# Patient Record
Sex: MALE | Race: WHITE | NOT HISPANIC OR LATINO | Employment: OTHER | URBAN - NONMETROPOLITAN AREA
[De-identification: names, ages, dates, MRNs, and addresses within clinical notes are randomized per-mention and may not be internally consistent; named-entity substitution may affect disease eponyms.]

---

## 2017-01-03 ENCOUNTER — OFFICE VISIT (OUTPATIENT)
Dept: FAMILY MEDICINE CLINIC | Facility: CLINIC | Age: 47
End: 2017-01-03

## 2017-01-03 VITALS
HEART RATE: 82 BPM | HEIGHT: 67 IN | TEMPERATURE: 98.6 F | BODY MASS INDEX: 32.96 KG/M2 | SYSTOLIC BLOOD PRESSURE: 120 MMHG | DIASTOLIC BLOOD PRESSURE: 82 MMHG | OXYGEN SATURATION: 98 % | WEIGHT: 210 LBS

## 2017-01-03 DIAGNOSIS — E66.9 OBESITY (BMI 30.0-34.9): ICD-10-CM

## 2017-01-03 DIAGNOSIS — K21.9 GASTROESOPHAGEAL REFLUX DISEASE WITHOUT ESOPHAGITIS: ICD-10-CM

## 2017-01-03 DIAGNOSIS — E78.00 PURE HYPERCHOLESTEROLEMIA: ICD-10-CM

## 2017-01-03 DIAGNOSIS — G40.909 SEIZURE DISORDER (HCC): ICD-10-CM

## 2017-01-03 DIAGNOSIS — M19.90 ARTHRITIS: ICD-10-CM

## 2017-01-03 DIAGNOSIS — G89.29 CHRONIC ELBOW PAIN, RIGHT: Primary | ICD-10-CM

## 2017-01-03 DIAGNOSIS — M25.521 CHRONIC ELBOW PAIN, RIGHT: Primary | ICD-10-CM

## 2017-01-03 DIAGNOSIS — M25.562 CHRONIC PAIN OF LEFT KNEE: ICD-10-CM

## 2017-01-03 DIAGNOSIS — I10 ESSENTIAL HYPERTENSION: ICD-10-CM

## 2017-01-03 DIAGNOSIS — F41.9 ANXIETY: ICD-10-CM

## 2017-01-03 DIAGNOSIS — G89.29 CHRONIC PAIN OF LEFT KNEE: ICD-10-CM

## 2017-01-03 PROBLEM — E66.811 OBESITY (BMI 30.0-34.9): Status: ACTIVE | Noted: 2017-01-03

## 2017-01-03 PROCEDURE — 99214 OFFICE O/P EST MOD 30 MIN: CPT | Performed by: NURSE PRACTITIONER

## 2017-01-03 RX ORDER — BACLOFEN 10 MG/1
10 TABLET ORAL 3 TIMES DAILY
Qty: 90 TABLET | Refills: 3 | Status: SHIPPED | OUTPATIENT
Start: 2017-01-03 | End: 2017-02-13

## 2017-01-03 NOTE — MR AVS SNAPSHOT
Jaquan Mckay   1/3/2017 10:40 AM   Office Visit    Dept Phone:  [phone]   Encounter #:  [id number]    Provider:  BALDOMERO Thao   Department:  [facility]                Your Full Care Plan              Today's Medication Changes          These changes are accurate as of: 1/3/17 12:26 PM.  If you have any questions, ask your nurse or doctor.               New Medication(s)Ordered:     baclofen 10 MG tablet   Commonly known as:  LIORESAL   Take 1 tablet by mouth 3 (Three) Times a Day.   Started by:  BALDOMERO Thao         Stop taking medication(s)listed here:     TiZANidine 4 MG capsule   Commonly known as:  ZANAFLEX   Stopped by:  BALDOMERO Thao                Where to Get Your Medications      These medications were sent to [place], [place] - [address] [facility] [address] - [phone] [facility] [phone]   [address] [facility] [address] [facility] [address]     Phone:  [phone]     baclofen 10 MG tablet                  Your Updated Medication List          This list is accurate as of: 1/3/17 12:26 PM.  Always use your most recent med list.                baclofen 10 MG tablet   Commonly known as:  LIORESAL   Take 1 tablet by mouth 3 (Three) Times a Day.       budesonide-formoterol 160-4.5 MCG/ACT inhaler   Commonly known as:  SYMBICORT   Inhale 2 puffs 2 (Two) Times a Day.       * dicyclomine 10 MG capsule   Commonly known as:  BENTYL   Take 1 capsule by mouth 4 (Four) Times a Day As Needed (abdominal pain).       * dicyclomine 20 MG tablet   Commonly known as:  BENTYL   Take 1 tablet by mouth 3 (Three) Times a Day.       DILANTIN 100 MG ER capsule   Generic drug:  phenytoin   Brand name necessary       docusate calcium 240 MG capsule   Commonly known as:  SURFAK   Take 1 capsule by mouth 2 (Two) Times a Day.       Elbow Brace misc   1 each daily.       lisinopril 20 MG tablet   Commonly known as:  PRINIVIL,ZESTRIL   Take 1 tablet by  mouth daily.       loratadine 10 MG tablet   Commonly known as:  CLARITIN   Take 1 tablet by mouth daily as needed for allergies.       meclizine 25 MG tablet   Commonly known as:  ANTIVERT   Take 1 tablet by mouth 3 (Three) Times a Day As Needed for dizziness.       metoclopramide 10 MG tablet   Commonly known as:  REGLAN   Take 1 tablet by mouth 2 (two) times a day before meals.       metoprolol succinate XL 25 MG 24 hr tablet   Commonly known as:  TOPROL-XL   Take 1 tablet by mouth daily.       montelukast 10 MG tablet   Commonly known as:  SINGULAIR   Take 1 tablet by mouth Every Night.       pantoprazole 40 MG EC tablet   Commonly known as:  PROTONIX   Take 1 tablet by mouth Daily.       pravastatin 20 MG tablet   Commonly known as:  PRAVACHOL   Take 1 tablet by mouth Daily.       promethazine 25 MG tablet   Commonly known as:  PHENERGAN       raNITIdine 300 MG tablet   Commonly known as:  ZANTAC   Take 0.5 tablets by mouth 2 (Two) Times a Day.       trifluoperazine 2 MG tablet   Commonly known as:  STELAZINE   Take 1 tablet by mouth 2 (two) times a day.       VESICARE 5 MG tablet   Generic drug:  solifenacin       * Notice:  This list has 2 medication(s) that are the same as other medications prescribed for you. Read the directions carefully, and ask your doctor or other care provider to review them with you.            We Performed the Following     Ambulatory Referral to Orthopedic Surgery     CBC & Differential     Comprehensive Metabolic Panel     Hemoglobin A1c     Lipid Panel     Phenytoin Level, Free     Phenytoin Level, Total     TSH     Vitamin B12     Vitamin D 25 Hydroxy       You Were Diagnosed With        Codes Comments    Chronic elbow pain, right    -  Primary ICD-10-CM: M25.521, G89.29  ICD-9-CM: 719.42, 338.29     Chronic pain of left knee     ICD-10-CM: M25.562, G89.29  ICD-9-CM: 719.46, 338.29     Anxiety     ICD-10-CM: F41.9  ICD-9-CM: 300.00     Arthritis     ICD-10-CM: M19.90  ICD-9-CM:  "716.90     Gastroesophageal reflux disease without esophagitis     ICD-10-CM: K21.9  ICD-9-CM: 530.81     Pure hypercholesterolemia     ICD-10-CM: E78.00  ICD-9-CM: 272.0     Essential hypertension     ICD-10-CM: I10  ICD-9-CM: 401.9     Seizure disorder     ICD-10-CM: G40.909  ICD-9-CM: 345.90     Obesity (BMI 30.0-34.9)     ICD-10-CM: E66.9  ICD-9-CM: 278.00       Instructions     None    Patient Instructions History      Upcoming Appointments     Visit Type Date Time Department    OFFICE VISIT 1/3/2017 10:40 AM [facility]    OFFICE VISIT 1/31/2017 10:00 AM [facility]      MyChart Signup     Our records indicate that you have declined [facility] [employer] signup. If you would like to sign up for [employer], please email [email or web address] or call [phone] to obtain an activation code.             Other Info from Your Visit           Your Appointments     Jan 31, 2017 10:00 AM EST   Office Visit with BALDOMERO Thao   [facility] (--)    [address] [place] [id number] [phone]           Please arrive 10 minutes early, bring a complete list of all medications and bring any previous records or diagnostic testing results.              Allergies     Paxil [Paroxetine Hcl]  Shortness Of Breath    Chest pain     Ciprofloxacin      Contrast Dye      Mobic [Meloxicam]      Penicillins      Prednisone      Robitussin Cough+ Chest Max St  [Dextromethorphan-guaifenesin]      Sulfa Antibiotics      Zoloft [Sertraline Hcl]  Hives, Itching      Reason for Visit     Anxiety     Depression     Hypertension           Vital Signs     Blood Pressure Pulse Temperature Height Weight Oxygen Saturation    120/82 (BP Location: Left arm, Patient Position: Sitting) 82 98.6 °F (37 °C) (Tympanic) 67\" (170.2 cm) 210 lb (95.3 kg) 98%    Body Mass Index Smoking Status                32.89 kg/m2 Current Every Day Smoker          Problems and Diagnoses Noted     " Anxiety problem    Arthritis    Acid reflux disease    High cholesterol or triglycerides    High blood pressure    Obesity    Seizure disorder    Chronic elbow pain    -  Primary    Chronic pain of left knee

## 2017-01-03 NOTE — PROGRESS NOTES
"Subjective   Jaquan Mckay is a 46 y.o. male.     Chief Complaint   Patient presents with   • Anxiety   • Depression   • Hypertension       History of Present Illness     Multiple joint complaints-left knee, right elbow mostly.  Reports a twisting injury to his left knee and it is now trying to \"lock up\".  He reports ongoing edema at times and \"it is going out like my right one did before the surgery.\"  Pain is exacerbated by walking and trying to work out.  He reports \"electical sensation\" in his arm with pressure over his elbow.  He request a referral to [facility] to orthopedic for further eval.  Not at goal.  Testicle pain-ongoing.  Has been seen by urology.  Ultrasound done.  No Specific diagnosis reported today.  Will follow up with urology.   Chest pain-not of new onset.  No acute findings at ED visit.  He has not followed with cardio as he was suppose to.  He does plan to call and make an appt for further eval.  Not at goal  Anxiety-reports significant increase during the holidays.  He reports he is not sleeping.  He is going to comp care but is not getting any med changes as he is \"allergic\" to meds or he does not have therapeutic response.      The following portions of the patient's history were reviewed and updated as appropriate: allergies, current medications, past family history, past medical history, past social history, past surgical history and problem list.    Review of Systems   Constitutional: Negative for appetite change, fatigue and unexpected weight change.   HENT: Negative for congestion, nosebleeds, postnasal drip, rhinorrhea, trouble swallowing and voice change.    Eyes: Negative for pain and visual disturbance.   Respiratory: Negative for cough, chest tightness and wheezing.    Cardiovascular: Positive for chest pain (missed his last cardio appt). Negative for palpitations and leg swelling.   Gastrointestinal: Negative for abdominal pain, blood in stool, constipation and diarrhea.        " "Heart burn controlled at present   Endocrine: Negative for cold intolerance and polydipsia.   Genitourinary: Negative for difficulty urinating and flank pain.   Musculoskeletal: Positive for arthralgias (rt elbow and left knee.  He reports his knee \"tries to give out on him.\"  he continues to have pain ). Negative for gait problem, joint swelling and myalgias.        Reports popping sensation in his right elbow with certain positions   Skin: Negative for color change and rash.   Allergic/Immunologic: Negative.    Neurological: Negative for syncope, numbness and headaches.   Hematological: Negative.    Psychiatric/Behavioral: Positive for sleep disturbance (reports [facility] has tried him on several meds and he has been unable to tolerate). Negative for suicidal ideas. The patient is nervous/anxious.         Depression with anxiety.  He was seen at [facility] recently and is going Q 2 weeks for therapy   All other systems reviewed and are negative.      Objective     Visit Vitals   • /82 (BP Location: Left arm, Patient Position: Sitting)   • Pulse 82   • Temp 98.6 °F (37 °C) (Tympanic)   • Ht 67\" (170.2 cm)   • Wt 210 lb (95.3 kg)   • SpO2 98%   • BMI 32.89 kg/m2       Physical Exam   Constitutional: He is oriented to person, place, and time. He appears well-developed and well-nourished. No distress.   HENT:   Head: Normocephalic and atraumatic.   Right Ear: External ear normal.   Left Ear: External ear normal.   Nose: Nose normal.   Mouth/Throat: Oropharynx is clear and moist.   Eyes: Conjunctivae and EOM are normal. Pupils are equal, round, and reactive to light.   Neck: Normal range of motion. Neck supple. No thyromegaly present.   Cardiovascular: Normal rate, regular rhythm and normal heart sounds.    No murmur heard.  Pulmonary/Chest: Effort normal and breath sounds normal. He exhibits tenderness (around right breast area).   Abdominal: Soft. Bowel sounds are normal. He exhibits no mass. There is no " "tenderness.   Musculoskeletal: He exhibits tenderness. He exhibits no edema.        Right elbow: He exhibits decreased range of motion (history of previous fracture at \"age 7\" then a fall several months ago that has exacerbated symtpoms.). He exhibits no effusion and no deformity. Tenderness found. Medial epicondyle (that radiates upward toward the inner part of arm) tenderness noted.        Left knee: He exhibits decreased range of motion and bony tenderness. He exhibits no swelling and normal alignment. Tenderness found. Medial joint line and lateral joint line tenderness noted.    3/5 in right hand with decreased ability to push and pull against resistance      Skin Integrity  -  His right foot skin is intact.   Jaquan's left foot skin is intact. .  Neurological: He is alert and oriented to person, place, and time. He has normal reflexes.   Skin: Skin is warm and dry.   Psychiatric: His behavior is normal. Judgment and thought content normal. His mood appears anxious. He exhibits a depressed mood. He expresses no homicidal and no suicidal ideation. He exhibits normal recent memory and normal remote memory.   Nursing note and vitals reviewed.      Assessment/Plan     Jaquan was seen today for anxiety, depression and hypertension.    Diagnoses and all orders for this visit:    Chronic elbow pain, right  -     Ambulatory Referral to Orthopedic Surgery    Chronic pain of left knee  -     Ambulatory Referral to Orthopedic Surgery    Anxiety    Arthritis    Gastroesophageal reflux disease without esophagitis  -     CBC & Differential    Pure hypercholesterolemia  -     Lipid Panel    Essential hypertension  -     CBC & Differential  -     Comprehensive Metabolic Panel    Seizure disorder  -     Phenytoin Level, Total  -     Phenytoin Level, Free    Obesity (BMI 30.0-34.9)  -     TSH  -     Hemoglobin A1c  -     Vitamin D 25 Hydroxy  -     Vitamin B12    Other orders  -     baclofen (LIORESAL) 10 MG tablet; Take 1 " tablet by mouth 3 (Three) Times a Day.      Order for fasting labs.  Patient to have done at [facility] per his request  Will request his Comp care records to see what psych meds have been tried.  Continue meds as ordered  Will do trial of baclofen and stop Zanaflex  Referral to Ortho for continued joint complaints  RTC 4 weeks, sooner if needed.

## 2017-01-09 ENCOUNTER — APPOINTMENT (OUTPATIENT)
Dept: LAB | Facility: HOSPITAL | Age: 47
End: 2017-01-09

## 2017-01-09 LAB
25(OH)D3 SERPL-MCNC: 26 NG/ML
ALBUMIN SERPL-MCNC: 4.2 G/DL (ref 3.5–5)
ALBUMIN/GLOB SERPL: 1.1 G/DL (ref 1.5–2.5)
ALP SERPL-CCNC: 95 U/L (ref 46–116)
ALT SERPL W P-5'-P-CCNC: 23 U/L (ref 10–44)
ANION GAP SERPL CALCULATED.3IONS-SCNC: 6.1 MMOL/L (ref 3.6–11.2)
AST SERPL-CCNC: 20 U/L (ref 10–34)
BASOPHILS # BLD AUTO: 0.01 10*3/MM3 (ref 0–0.3)
BASOPHILS NFR BLD AUTO: 0.1 % (ref 0–2)
BILIRUB SERPL-MCNC: 0.4 MG/DL (ref 0.2–1.8)
BUN BLD-MCNC: 12 MG/DL (ref 7–21)
BUN/CREAT SERPL: 11.7 (ref 7–25)
CALCIUM SPEC-SCNC: 9.4 MG/DL (ref 7.7–10)
CHLORIDE SERPL-SCNC: 102 MMOL/L (ref 99–112)
CHOLEST SERPL-MCNC: 257 MG/DL (ref 0–200)
CO2 SERPL-SCNC: 30.9 MMOL/L (ref 24.3–31.9)
CREAT BLD-MCNC: 1.03 MG/DL (ref 0.43–1.29)
DEPRECATED RDW RBC AUTO: 44.8 FL (ref 37–54)
EOSINOPHIL # BLD AUTO: 0.1 10*3/MM3 (ref 0–0.7)
EOSINOPHIL NFR BLD AUTO: 1.3 % (ref 0–5)
ERYTHROCYTE [DISTWIDTH] IN BLOOD BY AUTOMATED COUNT: 13 % (ref 11.5–14.5)
GFR SERPL CREATININE-BSD FRML MDRD: 78 ML/MIN/1.73
GLOBULIN UR ELPH-MCNC: 3.7 GM/DL
GLUCOSE BLD-MCNC: 116 MG/DL (ref 70–110)
HBA1C MFR BLD: 4.7 % (ref 4.5–5.7)
HCT VFR BLD AUTO: 46.9 % (ref 42–52)
HDLC SERPL-MCNC: 74 MG/DL (ref 60–100)
HGB BLD-MCNC: 16.5 G/DL (ref 14–18)
IMM GRANULOCYTES # BLD: 0.01 10*3/MM3 (ref 0–0.03)
IMM GRANULOCYTES NFR BLD: 0.1 % (ref 0–0.5)
LDLC SERPL CALC-MCNC: 169 MG/DL (ref 0–100)
LDLC/HDLC SERPL: 2.29 {RATIO}
LYMPHOCYTES # BLD AUTO: 1.27 10*3/MM3 (ref 1–3)
LYMPHOCYTES NFR BLD AUTO: 16.3 % (ref 21–51)
MCH RBC QN AUTO: 33.3 PG (ref 27–33)
MCHC RBC AUTO-ENTMCNC: 35.2 G/DL (ref 33–37)
MCV RBC AUTO: 94.6 FL (ref 80–94)
MONOCYTES # BLD AUTO: 1.14 10*3/MM3 (ref 0.1–0.9)
MONOCYTES NFR BLD AUTO: 14.6 % (ref 0–10)
NEUTROPHILS # BLD AUTO: 5.28 10*3/MM3 (ref 1.4–6.5)
NEUTROPHILS NFR BLD AUTO: 67.6 % (ref 30–70)
OSMOLALITY SERPL CALC.SUM OF ELEC: 278.3 MOSM/KG (ref 273–305)
PHENYTOIN SERPL-MCNC: 14.6 MCG/ML (ref 10–20)
PLATELET # BLD AUTO: 169 10*3/MM3 (ref 130–400)
PMV BLD AUTO: 10.3 FL (ref 6–10)
POTASSIUM BLD-SCNC: 4.7 MMOL/L (ref 3.5–5.3)
PROT SERPL-MCNC: 7.9 G/DL (ref 6–8)
RBC # BLD AUTO: 4.96 10*6/MM3 (ref 4.7–6.1)
SODIUM BLD-SCNC: 139 MMOL/L (ref 135–153)
TRIGL SERPL-MCNC: 68 MG/DL (ref 0–150)
TSH SERPL DL<=0.05 MIU/L-ACNC: 3.66 MIU/ML (ref 0.55–4.78)
VIT B12 BLD-MCNC: 426 PG/ML (ref 211–911)
VLDLC SERPL-MCNC: 13.6 MG/DL
WBC NRBC COR # BLD: 7.81 10*3/MM3 (ref 4.5–12.5)

## 2017-01-09 PROCEDURE — 82607 VITAMIN B-12: CPT | Performed by: NURSE PRACTITIONER

## 2017-01-09 PROCEDURE — 80186 ASSAY OF PHENYTOIN FREE: CPT | Performed by: NURSE PRACTITIONER

## 2017-01-09 PROCEDURE — 36415 COLL VENOUS BLD VENIPUNCTURE: CPT | Performed by: NURSE PRACTITIONER

## 2017-01-09 PROCEDURE — 80050 GENERAL HEALTH PANEL: CPT | Performed by: NURSE PRACTITIONER

## 2017-01-09 PROCEDURE — 83036 HEMOGLOBIN GLYCOSYLATED A1C: CPT | Performed by: NURSE PRACTITIONER

## 2017-01-09 PROCEDURE — 82306 VITAMIN D 25 HYDROXY: CPT | Performed by: NURSE PRACTITIONER

## 2017-01-09 PROCEDURE — 80061 LIPID PANEL: CPT | Performed by: NURSE PRACTITIONER

## 2017-01-09 PROCEDURE — 80185 ASSAY OF PHENYTOIN TOTAL: CPT | Performed by: NURSE PRACTITIONER

## 2017-01-10 ENCOUNTER — OFFICE VISIT (OUTPATIENT)
Dept: FAMILY MEDICINE CLINIC | Facility: CLINIC | Age: 47
End: 2017-01-10

## 2017-01-10 VITALS
TEMPERATURE: 98.9 F | OXYGEN SATURATION: 98 % | HEART RATE: 91 BPM | BODY MASS INDEX: 33.43 KG/M2 | DIASTOLIC BLOOD PRESSURE: 60 MMHG | SYSTOLIC BLOOD PRESSURE: 115 MMHG | HEIGHT: 67 IN | WEIGHT: 213 LBS

## 2017-01-10 DIAGNOSIS — J45.901 ASTHMA EXACERBATION: Primary | ICD-10-CM

## 2017-01-10 LAB — PHENYTOIN FREE SERPL-MCNC: 0.9 UG/ML (ref 1–2)

## 2017-01-10 PROCEDURE — 99213 OFFICE O/P EST LOW 20 MIN: CPT | Performed by: NURSE PRACTITIONER

## 2017-01-10 RX ORDER — DEXTROMETHORPHAN HYDROBROMIDE AND PROMETHAZINE HYDROCHLORIDE 15; 6.25 MG/5ML; MG/5ML
5 SYRUP ORAL 4 TIMES DAILY PRN
Qty: 180 ML | Refills: 0 | Status: SHIPPED | OUTPATIENT
Start: 2017-01-10 | End: 2017-03-20 | Stop reason: SDUPTHER

## 2017-01-10 RX ORDER — CEPHALEXIN 500 MG/1
500 CAPSULE ORAL 3 TIMES DAILY
Qty: 30 CAPSULE | Refills: 0 | Status: SHIPPED | OUTPATIENT
Start: 2017-01-10 | End: 2017-01-20

## 2017-01-10 RX ORDER — ALBUTEROL SULFATE 90 UG/1
2 AEROSOL, METERED RESPIRATORY (INHALATION) EVERY 4 HOURS PRN
Qty: 1 INHALER | Refills: 5 | Status: SHIPPED | OUTPATIENT
Start: 2017-01-10 | End: 2017-03-20 | Stop reason: SDUPTHER

## 2017-01-10 NOTE — MR AVS SNAPSHOT
Jaquan Mckay   1/10/2017 9:20 AM   Office Visit    Dept Phone:  [phone]   Encounter #:  [id number]    Provider:  BALDOMERO Thao   Department:  [facility]                Your Full Care Plan              Today's Medication Changes          These changes are accurate as of: 1/10/17 10:32 AM.  If you have any questions, ask your nurse or doctor.               New Medication(s)Ordered:     albuterol 108 (90 BASE) MCG/ACT inhaler   Commonly known as:  PROVENTIL HFA;VENTOLIN HFA   Inhale 2 puffs Every 4 (Four) Hours As Needed for shortness of air.   Started by:  BALDOMERO Thao       cephalexin 500 MG capsule   Commonly known as:  KEFLEX   Take 1 capsule by mouth 3 (Three) Times a Day for 10 days.   Started by:  BALDOMERO Thao       promethazine-dextromethorphan 6.25-15 MG/5ML syrup   Commonly known as:  PROMETHAZINE-DM   Take 5 mL by mouth 4 (Four) Times a Day As Needed for cough.   Started by:  BALDOMERO Thao       sodium chloride 0.65 % nasal spray   Commonly known as:  OCEAN   2 sprays into each nostril As Needed for congestion.   Started by:  BALDOMERO Thao            Where to Get Your Medications      These medications were sent to [facility] [place], [place] - [address] [place] [address] - [phone]  - [phone] [address] [address]     Phone:  [phone]     albuterol 108 (90 BASE) MCG/ACT inhaler    cephalexin 500 MG capsule    promethazine-dextromethorphan 6.25-15 MG/5ML syrup    sodium chloride 0.65 % nasal spray                  Your Updated Medication List          This list is accurate as of: 1/10/17 10:32 AM.  Always use your most recent med list.                albuterol 108 (90 BASE) MCG/ACT inhaler   Commonly known as:  PROVENTIL HFA;VENTOLIN HFA   Inhale 2 puffs Every 4 (Four) Hours As Needed for shortness of air.       baclofen 10 MG tablet   Commonly known as:   LIORESAL   Take 1 tablet by mouth 3 (Three) Times a Day.       budesonide-formoterol 160-4.5 MCG/ACT inhaler   Commonly known as:  SYMBICORT   Inhale 2 puffs 2 (Two) Times a Day.       cephalexin 500 MG capsule   Commonly known as:  KEFLEX   Take 1 capsule by mouth 3 (Three) Times a Day for 10 days.       * dicyclomine 10 MG capsule   Commonly known as:  BENTYL   Take 1 capsule by mouth 4 (Four) Times a Day As Needed (abdominal pain).       * dicyclomine 20 MG tablet   Commonly known as:  BENTYL   Take 1 tablet by mouth 3 (Three) Times a Day.       DILANTIN 100 MG ER capsule   Generic drug:  phenytoin   Brand name necessary       docusate calcium 240 MG capsule   Commonly known as:  SURFAK   Take 1 capsule by mouth 2 (Two) Times a Day.       Elbow Brace misc   1 each daily.       lisinopril 20 MG tablet   Commonly known as:  PRINIVIL,ZESTRIL   Take 1 tablet by mouth daily.       loratadine 10 MG tablet   Commonly known as:  CLARITIN   Take 1 tablet by mouth daily as needed for allergies.       meclizine 25 MG tablet   Commonly known as:  ANTIVERT   Take 1 tablet by mouth 3 (Three) Times a Day As Needed for dizziness.       metoclopramide 10 MG tablet   Commonly known as:  REGLAN   Take 1 tablet by mouth 2 (two) times a day before meals.       metoprolol succinate XL 25 MG 24 hr tablet   Commonly known as:  TOPROL-XL   Take 1 tablet by mouth daily.       montelukast 10 MG tablet   Commonly known as:  SINGULAIR   Take 1 tablet by mouth Every Night.       pantoprazole 40 MG EC tablet   Commonly known as:  PROTONIX   Take 1 tablet by mouth Daily.       pravastatin 20 MG tablet   Commonly known as:  PRAVACHOL   Take 1 tablet by mouth Daily.       promethazine 25 MG tablet   Commonly known as:  PHENERGAN       promethazine-dextromethorphan 6.25-15 MG/5ML syrup   Commonly known as:  PROMETHAZINE-DM   Take 5 mL by mouth 4 (Four) Times a Day As Needed for cough.       raNITIdine 300 MG tablet   Commonly known as:  ZANTAC    Take 0.5 tablets by mouth 2 (Two) Times a Day.       sodium chloride 0.65 % nasal spray   Commonly known as:  OCEAN   2 sprays into each nostril As Needed for congestion.       trifluoperazine 2 MG tablet   Commonly known as:  STELAZINE   Take 1 tablet by mouth 2 (two) times a day.       VESICARE 5 MG tablet   Generic drug:  solifenacin       * Notice:  This list has 2 medication(s) that are the same as other medications prescribed for you. Read the directions carefully, and ask your doctor or other care provider to review them with you.            You Were Diagnosed With        Codes Comments    Asthma exacerbation    -  Primary ICD-10-CM: J45.901  ICD-9-CM: 493.92       Instructions     None    Patient Instructions History      Upcoming Appointments     Visit Type Date Time Department    OFFICE VISIT 1/10/2017  9:20 AM [facility]    OFFICE VISIT 1/31/2017 10:00 AM [facility]      MyChart Signup     Our records indicate that you have declined [facility] [email or web address] signup. If you would like to sign up for [employer], please email [email or web address] or call [phone] to obtain an activation code.             Other Info from Your Visit           Your Appointments     Jan 31, 2017 10:00 AM EST   Office Visit with BALDOMERO Thao   [facility] (--)    [address] [place] [id number] [phone]           Please arrive 10 minutes early, bring a complete list of all medications and bring any previous records or diagnostic testing results.              Allergies     Paxil [Paroxetine Hcl]  Shortness Of Breath    Chest pain     Ciprofloxacin      Contrast Dye      Mobic [Meloxicam]      Penicillins      Prednisone      Robitussin Cough+ Chest Max St  [Dextromethorphan-guaifenesin]      Sulfa Antibiotics      Zoloft [Sertraline Hcl]  Hives, Itching      Reason for Visit     Cough     Earache     Sore Throat     Shortness of Breath           Vital  "Signs     Blood Pressure Pulse Temperature Height Weight Oxygen Saturation    115/60 (BP Location: Left arm, Patient Position: Sitting) 91 98.9 °F (37.2 °C) (Tympanic) 67\" (170.2 cm) 213 lb (96.6 kg) 98%    Body Mass Index Smoking Status                33.36 kg/m2 Current Every Day Smoker          Problems and Diagnoses Noted     Asthma exacerbation    -  Primary        "

## 2017-01-10 NOTE — PROGRESS NOTES
"Subjective   Jaquan Mckay is a 46 y.o. male.     Chief Complaint   Patient presents with   • Cough   • Earache   • Sore Throat   • Shortness of Breath       History of Present Illness     Asthma exacerbation-cough with laryngitis.  SOA with tightness in chest.  Fatigued but no fever.  (+) PND, nasal congestion, sore throat.  Symptoms have been present for 2-3 days.  Not at goal.     The following portions of the patient's history were reviewed and updated as appropriate: allergies, current medications, past family history, past medical history, past social history, past surgical history and problem list.    Review of Systems   Constitutional: Positive for fatigue and fever. Negative for appetite change.   HENT: Positive for congestion, ear pain (left ear), postnasal drip, rhinorrhea, sinus pressure, sore throat, trouble swallowing and voice change.    Eyes: Positive for discharge.   Respiratory: Positive for cough, chest tightness, shortness of breath and wheezing.         Soreness in chest from coughing   Cardiovascular: Positive for chest pain.   Gastrointestinal: Negative for diarrhea, nausea and vomiting.   All other systems reviewed and are negative.      Objective     Visit Vitals   • /60 (BP Location: Left arm, Patient Position: Sitting)   • Pulse 91   • Temp 98.9 °F (37.2 °C) (Tympanic)   • Ht 67\" (170.2 cm)   • Wt 213 lb (96.6 kg)   • SpO2 98%   • BMI 33.36 kg/m2       Physical Exam   Constitutional: He is oriented to person, place, and time. He appears well-developed and well-nourished. No distress.   HENT:   Head: Normocephalic and atraumatic.   Right Ear: External ear normal. Tympanic membrane is erythematous. A middle ear effusion is present.   Left Ear: External ear normal. Tympanic membrane is erythematous. A middle ear effusion is present.   Nose: Mucosal edema and rhinorrhea present. Right sinus exhibits maxillary sinus tenderness and frontal sinus tenderness. Left sinus exhibits maxillary " sinus tenderness and frontal sinus tenderness.   Mouth/Throat: Oropharyngeal exudate and posterior oropharyngeal erythema present.   Eyes: Conjunctivae and EOM are normal. Pupils are equal, round, and reactive to light.   Neck: Normal range of motion. Neck supple. No thyromegaly present.   Cardiovascular: Normal rate, regular rhythm and normal heart sounds.    No murmur heard.  Pulmonary/Chest: Effort normal. He has wheezes (mild-diffuse). He exhibits tenderness (around right breast area).   Abdominal: Soft. Bowel sounds are normal. He exhibits no mass. There is no tenderness.   Musculoskeletal: He exhibits tenderness. He exhibits no edema.        Right elbow: He exhibits decreased range of motion (with flexion and extention). He exhibits no effusion and no deformity. Tenderness found. Medial epicondyle (that radiates upward toward the inner part of arm) tenderness noted.        Left knee: He exhibits decreased range of motion and bony tenderness. He exhibits no swelling and normal alignment. Tenderness found. Medial joint line and lateral joint line tenderness noted.    3/5 in right hand with decreased ability to push and pull against resistance   Lymphadenopathy:     He has cervical adenopathy.        Right cervical: Superficial cervical adenopathy present.        Left cervical: Superficial cervical adenopathy present.   Neurological: He is alert and oriented to person, place, and time. He has normal reflexes.   Skin: Skin is warm and dry.   Psychiatric: His behavior is normal. Judgment and thought content normal. His mood appears anxious. He exhibits a depressed mood. He expresses no homicidal and no suicidal ideation. He exhibits normal recent memory and normal remote memory.   Vitals reviewed.      Assessment/Plan     Jaquan was seen today for cough, earache, sore throat and shortness of breath.    Diagnoses and all orders for this visit:    Asthma exacerbation  -     albuterol (PROVENTIL HFA;VENTOLIN HFA)  108 (90 BASE) MCG/ACT inhaler; Inhale 2 puffs Every 4 (Four) Hours As Needed for shortness of air.  -     promethazine-dextromethorphan (PROMETHAZINE-DM) 6.25-15 MG/5ML syrup; Take 5 mL by mouth 4 (Four) Times a Day As Needed for cough.  -     cephalexin (KEFLEX) 500 MG capsule; Take 1 capsule by mouth 3 (Three) Times a Day for 10 days.  -     sodium chloride (OCEAN) 0.65 % nasal spray; 2 sprays into each nostril As Needed for congestion.      Inhaler routinely until SOA resolves  Instructed to complete all of antibiotics.  Increase PO fluids, avoid caffeine.  Do not save meds for later use.  Rest PRN  Steam expectoration, warm compress to sinus, Saline PRN for moisture  Understands disease processes and need for medications.  Understands reasons for urgent and emergent care.  Patient (& family) verbalized agreement for treatment plan.   To ED for worsening symptoms  RTC PRN 3-5 days for worsening or non resolving symptoms

## 2017-01-12 ENCOUNTER — HOSPITAL ENCOUNTER (EMERGENCY)
Facility: HOSPITAL | Age: 47
Discharge: HOME | End: 2017-01-12
Attending: EMERGENCY MEDICINE | Admitting: EMERGENCY MEDICINE

## 2017-01-12 ENCOUNTER — APPOINTMENT (OUTPATIENT)
Dept: GENERAL RADIOLOGY | Facility: HOSPITAL | Age: 47
End: 2017-01-12

## 2017-01-12 VITALS
BODY MASS INDEX: 32.8 KG/M2 | WEIGHT: 209 LBS | SYSTOLIC BLOOD PRESSURE: 110 MMHG | HEIGHT: 67 IN | TEMPERATURE: 98.2 F | OXYGEN SATURATION: 98 % | DIASTOLIC BLOOD PRESSURE: 68 MMHG | RESPIRATION RATE: 19 BRPM | HEART RATE: 71 BPM

## 2017-01-12 DIAGNOSIS — F41.9 ANXIETY: Primary | ICD-10-CM

## 2017-01-12 DIAGNOSIS — R07.89 CHEST PAIN, NON-CARDIAC: ICD-10-CM

## 2017-01-12 LAB
A-A DO2: 20 MMHG (ref 0–300)
ALBUMIN SERPL-MCNC: 4.4 G/DL (ref 3.5–5)
ALBUMIN/GLOB SERPL: 1.1 G/DL (ref 1.5–2.5)
ALP SERPL-CCNC: 101 U/L (ref 46–116)
ALT SERPL W P-5'-P-CCNC: 15 U/L (ref 10–44)
ANION GAP SERPL CALCULATED.3IONS-SCNC: 4.8 MMOL/L (ref 3.6–11.2)
ARTERIAL PATENCY WRIST A: POSITIVE
AST SERPL-CCNC: 20 U/L (ref 10–34)
ATMOSPHERIC PRESS: 728 MMHG
BASE EXCESS BLDA CALC-SCNC: -0.1 MMOL/L
BASOPHILS # BLD AUTO: 0.02 10*3/MM3 (ref 0–0.3)
BASOPHILS NFR BLD AUTO: 0.4 % (ref 0–2)
BDY SITE: ABNORMAL
BILIRUB SERPL-MCNC: 0.4 MG/DL (ref 0.2–1.8)
BODY TEMPERATURE: 98.6 C
BUN BLD-MCNC: 12 MG/DL (ref 7–21)
BUN/CREAT SERPL: 11.8 (ref 7–25)
CALCIUM SPEC-SCNC: 9.5 MG/DL (ref 7.7–10)
CHLORIDE SERPL-SCNC: 104 MMOL/L (ref 99–112)
CO2 SERPL-SCNC: 29.2 MMOL/L (ref 24.3–31.9)
COHGB MFR BLD: 1.4 % (ref 0–5)
CREAT BLD-MCNC: 1.02 MG/DL (ref 0.43–1.29)
DEPRECATED RDW RBC AUTO: 42.9 FL (ref 37–54)
EOSINOPHIL # BLD AUTO: 0.07 10*3/MM3 (ref 0–0.7)
EOSINOPHIL NFR BLD AUTO: 1.3 % (ref 0–5)
ERYTHROCYTE [DISTWIDTH] IN BLOOD BY AUTOMATED COUNT: 12.9 % (ref 11.5–14.5)
GFR SERPL CREATININE-BSD FRML MDRD: 79 ML/MIN/1.73
GLOBULIN UR ELPH-MCNC: 4 GM/DL
GLUCOSE BLD-MCNC: 106 MG/DL (ref 70–110)
HCO3 BLDA-SCNC: 20.5 MMOL/L (ref 22–26)
HCT VFR BLD AUTO: 46.5 % (ref 42–52)
HCT VFR BLD CALC: 49 % (ref 42–52)
HGB BLD-MCNC: 16.1 G/DL (ref 14–18)
HGB BLDA-MCNC: 16.6 G/DL (ref 12–16)
HOROWITZ INDEX BLD+IHG-RTO: 21 %
IMM GRANULOCYTES # BLD: 0 10*3/MM3 (ref 0–0.03)
IMM GRANULOCYTES NFR BLD: 0 % (ref 0–0.5)
LYMPHOCYTES # BLD AUTO: 1.61 10*3/MM3 (ref 1–3)
LYMPHOCYTES NFR BLD AUTO: 29.8 % (ref 21–51)
MCH RBC QN AUTO: 32.4 PG (ref 27–33)
MCHC RBC AUTO-ENTMCNC: 34.6 G/DL (ref 33–37)
MCV RBC AUTO: 93.6 FL (ref 80–94)
METHGB BLD QL: 0.5 % (ref 0–3)
MODALITY: ABNORMAL
MONOCYTES # BLD AUTO: 0.92 10*3/MM3 (ref 0.1–0.9)
MONOCYTES NFR BLD AUTO: 17 % (ref 0–10)
NEUTROPHILS # BLD AUTO: 2.78 10*3/MM3 (ref 1.4–6.5)
NEUTROPHILS NFR BLD AUTO: 51.5 % (ref 30–70)
OSMOLALITY SERPL CALC.SUM OF ELEC: 275.9 MOSM/KG (ref 273–305)
OXYHGB MFR BLDV: 96.7 % (ref 85–100)
PCO2 BLDA: 24.7 MM HG (ref 35–45)
PH BLDA: 7.54 PH UNITS (ref 7.35–7.45)
PLATELET # BLD AUTO: 177 10*3/MM3 (ref 130–400)
PMV BLD AUTO: 9.9 FL (ref 6–10)
PO2 BLDA: 93.4 MM HG (ref 80–100)
POTASSIUM BLD-SCNC: 4.9 MMOL/L (ref 3.5–5.3)
PROT SERPL-MCNC: 8.4 G/DL (ref 6–8)
RBC # BLD AUTO: 4.97 10*6/MM3 (ref 4.7–6.1)
SAO2 % BLDCOA: 98.6 % (ref 90–100)
SODIUM BLD-SCNC: 138 MMOL/L (ref 135–153)
TROPONIN I SERPL-MCNC: <0.006 NG/ML
WBC NRBC COR # BLD: 5.4 10*3/MM3 (ref 4.5–12.5)

## 2017-01-12 PROCEDURE — 93005 ELECTROCARDIOGRAM TRACING: CPT

## 2017-01-12 PROCEDURE — 25010000002 LORAZEPAM PER 2 MG: Performed by: EMERGENCY MEDICINE

## 2017-01-12 PROCEDURE — 82805 BLOOD GASES W/O2 SATURATION: CPT | Performed by: EMERGENCY MEDICINE

## 2017-01-12 PROCEDURE — 83050 HGB METHEMOGLOBIN QUAN: CPT | Performed by: EMERGENCY MEDICINE

## 2017-01-12 PROCEDURE — 80053 COMPREHEN METABOLIC PANEL: CPT | Performed by: EMERGENCY MEDICINE

## 2017-01-12 PROCEDURE — 82375 ASSAY CARBOXYHB QUANT: CPT | Performed by: EMERGENCY MEDICINE

## 2017-01-12 PROCEDURE — 85025 COMPLETE CBC W/AUTO DIFF WBC: CPT | Performed by: EMERGENCY MEDICINE

## 2017-01-12 PROCEDURE — 99284 EMERGENCY DEPT VISIT MOD MDM: CPT

## 2017-01-12 PROCEDURE — 84484 ASSAY OF TROPONIN QUANT: CPT | Performed by: EMERGENCY MEDICINE

## 2017-01-12 PROCEDURE — 71010 HC CHEST PA OR AP: CPT

## 2017-01-12 PROCEDURE — 36600 WITHDRAWAL OF ARTERIAL BLOOD: CPT | Performed by: EMERGENCY MEDICINE

## 2017-01-12 PROCEDURE — 71010 XR CHEST 1 VW: CPT | Performed by: RADIOLOGY

## 2017-01-12 PROCEDURE — 36415 COLL VENOUS BLD VENIPUNCTURE: CPT

## 2017-01-12 PROCEDURE — 96374 THER/PROPH/DIAG INJ IV PUSH: CPT

## 2017-01-12 RX ORDER — ASPIRIN 81 MG/1
324 TABLET, CHEWABLE ORAL ONCE
Status: COMPLETED | OUTPATIENT
Start: 2017-01-12 | End: 2017-01-12

## 2017-01-12 RX ORDER — ASPIRIN 81 MG/1
TABLET, CHEWABLE ORAL
Status: DISCONTINUED
Start: 2017-01-12 | End: 2017-01-12 | Stop reason: HOSPADM

## 2017-01-12 RX ORDER — SODIUM CHLORIDE 0.9 % (FLUSH) 0.9 %
10 SYRINGE (ML) INJECTION AS NEEDED
Status: DISCONTINUED | OUTPATIENT
Start: 2017-01-12 | End: 2017-01-12 | Stop reason: HOSPADM

## 2017-01-12 RX ORDER — LORAZEPAM 2 MG/ML
1 INJECTION INTRAMUSCULAR ONCE
Status: COMPLETED | OUTPATIENT
Start: 2017-01-12 | End: 2017-01-12

## 2017-01-12 RX ADMIN — ASPIRIN 324 MG: 81 TABLET, CHEWABLE ORAL at 12:48

## 2017-01-12 RX ADMIN — LORAZEPAM 1 MG: 2 INJECTION INTRAMUSCULAR; INTRAVENOUS at 15:19

## 2017-01-12 RX ADMIN — NITROGLYCERIN 1 INCH: 20 OINTMENT TOPICAL at 12:44

## 2017-01-12 NOTE — ED NOTES
Pt complains of heaviness in chest with pain in center     Karyna Handley, CARLOS  01/12/17 [id number]

## 2017-01-12 NOTE — ED PROVIDER NOTES
"Subjective   History of Present Illness  46-year-old white male complains of chest pain.  He describes a 2 day history of intermittent substernal sharp chest, stabbing chest pain radiating through to his back.  This is associated with shortness of breath, dizziness, lightheadedness.  He denied any nausea, diaphoresis.  He states that he had abnormal stress test one year ago.  He has been off of his anxiety medicines because he recently states that he had a \"reaction\" to those and that his doctor is working to find him another medicine for his anxiety.  Review of Systems   All other systems reviewed and are negative.      Past Medical History   Diagnosis Date   • Allergic    • Anxiety    • Arthritis    • Asthma    • Clotting disorder 2004     had a knee surgery   • Depression    • Gastric ulcer    • GERD (gastroesophageal reflux disease)    • H/O migraine    • H/O sleep apnea    • Headache    • Heart attack    • History of epilepsy    • History of seizures    • Hyperlipidemia    • Hypertension    • Knee pain, acute      Left   • Low back pain    • Migraine    • Obesity    • [place] spotted fever    • Seizures        Allergies   Allergen Reactions   • Paxil [Paroxetine Hcl] Shortness Of Breath     Chest pain    • Ciprofloxacin    • Contrast Dye    • Mobic [Meloxicam]    • Penicillins    • Prednisone    • Robitussin Cough+ Chest Max St  [Dextromethorphan-Guaifenesin]    • Sulfa Antibiotics    • Zoloft [Sertraline Hcl] Hives and Itching       Past Surgical History   Procedure Laterality Date   • Back surgery     • Cholecystectomy     • Tumor excision       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   • Knee surgery     • Brain surgery  1986     Tumor removal        Family History   Problem Relation Age of Onset   • Diabetes Mother    • Hypertension Mother    • Diabetes Father    • Skin cancer Father    • Hypertension Father    • Diabetes Brother    • Hypertension Brother        Social History     Social History "   • Marital status:      Spouse name: N/A   • Number of children: 2   • Years of education: 12     Occupational History   • DISABLED      Social History Main Topics   • Smoking status: Current Every Day Smoker     Packs/day: 1.00     Types: Cigars, Cigarettes   • Smokeless tobacco: Never Used   • Alcohol use No   • Drug use: No   • Sexual activity: Defer     Other Topics Concern   • None     Social History Narrative           Objective   Physical Exam   Constitutional: He is oriented to person, place, and time. He appears well-developed and well-nourished.   HENT:   Head: Normocephalic and atraumatic.   Neck: Normal range of motion. Neck supple.   Cardiovascular: Normal rate, regular rhythm and normal heart sounds.  Exam reveals no gallop and no friction rub.    No murmur heard.  Pulmonary/Chest: Effort normal and breath sounds normal. No respiratory distress. He has no wheezes. He has no rales.   Abdominal: Soft. Bowel sounds are normal.   Musculoskeletal: Normal range of motion. He exhibits no edema.   Neurological: He is alert and oriented to person, place, and time.   Skin: Skin is warm and dry.   Psychiatric: He has a normal mood and affect.   Nursing note and vitals reviewed.      Procedures  Results for orders placed or performed during the hospital encounter of 01/12/17   Comprehensive Metabolic Panel   Result Value Ref Range    Glucose 106 70 - 110 mg/dL    BUN 12 7 - 21 mg/dL    Creatinine 1.02 0.43 - 1.29 mg/dL    Sodium 138 135 - 153 mmol/L    Potassium 4.9 3.5 - 5.3 mmol/L    Chloride 104 99 - 112 mmol/L    CO2 29.2 24.3 - 31.9 mmol/L    Calcium 9.5 7.7 - 10.0 mg/dL    Total Protein 8.4 (H) 6.0 - 8.0 g/dL    Albumin 4.40 3.50 - 5.00 g/dL    ALT (SGPT) 15 10 - 44 U/L    AST (SGOT) 20 10 - 34 U/L    Alkaline Phosphatase 101 46 - 116 U/L    Total Bilirubin 0.4 0.2 - 1.8 mg/dL    eGFR Non African Amer 79 >60 mL/min/1.73    Globulin 4.0 gm/dL    A/G Ratio 1.1 (L) 1.5 - 2.5 g/dL    BUN/Creatinine Ratio  11.8 7.0 - 25.0    Anion Gap 4.8 3.6 - 11.2 mmol/L   Troponin   Result Value Ref Range    Troponin I <0.006 <=0.040 ng/mL   Blood Gas, Arterial   Result Value Ref Range    Site Arterial: left radial     Jarad's Test Positive     pH, Arterial 7.536 (C) 7.350 - 7.450 pH units    pCO2, Arterial 24.7 (C) 35.0 - 45.0 mm Hg    pO2, Arterial 93.4 80.0 - 100.0 mm Hg    HCO3, Arterial 20.5 (L) 22.0 - 26.0 mmol/L    Base Excess, Arterial -0.1 mmol/L    O2 Saturation, Arterial 98.6 90.0 - 100.0 %    Hemoglobin, Blood Gas 16.6 (H) 12 - 16 g/dL    Hematocrit, Blood Gas 49.0 42.0 - 52.0 %    Oxyhemoglobin 96.7 85 - 100 %    Methemoglobin 0.5 0 - 3 %    Carboxyhemoglobin 1.4 0 - 5 %    A-a Gradiant 20.0 0.0 - 300.0 mmHg    Temperature 98.6 C    Barometric Pressure for Blood Gas 728 mmHg    Modality Room air     FIO2 21 %   CBC Auto Differential   Result Value Ref Range    WBC 5.40 4.50 - 12.50 10*3/mm3    RBC 4.97 4.70 - 6.10 10*6/mm3    Hemoglobin 16.1 14.0 - 18.0 g/dL    Hematocrit 46.5 42.0 - 52.0 %    MCV 93.6 80.0 - 94.0 fL    MCH 32.4 27.0 - 33.0 pg    MCHC 34.6 33.0 - 37.0 g/dL    RDW 12.9 11.5 - 14.5 %    RDW-SD 42.9 37.0 - 54.0 fl    MPV 9.9 6.0 - 10.0 fL    Platelets 177 130 - 400 10*3/mm3    Neutrophil % 51.5 30.0 - 70.0 %    Lymphocyte % 29.8 21.0 - 51.0 %    Monocyte % 17.0 (H) 0.0 - 10.0 %    Eosinophil % 1.3 0.0 - 5.0 %    Basophil % 0.4 0.0 - 2.0 %    Immature Grans % 0.0 0.0 - 0.5 %    Neutrophils, Absolute 2.78 1.40 - 6.50 10*3/mm3    Lymphocytes, Absolute 1.61 1.00 - 3.00 10*3/mm3    Monocytes, Absolute 0.92 (H) 0.10 - 0.90 10*3/mm3    Eosinophils, Absolute 0.07 0.00 - 0.70 10*3/mm3    Basophils, Absolute 0.02 0.00 - 0.30 10*3/mm3    Immature Grans, Absolute 0.00 0.00 - 0.03 10*3/mm3   Osmolality, Calculated   Result Value Ref Range    Osmolality Calc 275.9 273.0 - 305.0 mOsm/kg     Xr Chest 1 View    Result Date: 1/12/2017  Narrative: XR CHEST 1 VW-  CLINICAL INDICATION: cp     COMPARISON: 12/20/2016   TECHNIQUE: Single frontal view of the chest.  FINDINGS:  There is no focal alveolar infiltrate or effusion. The cardiac silhouette is normal. The pulmonary vasculature is unremarkable. There is no evidence of an acute osseous abnormality. There are no suspicious-appearing parenchymal soft tissue nodules.         Impression: No evidence of active or acute cardiopulmonary disease on today's chest radiograph.     This report was finalized on 1/12/2017 1:30 PM by Dr. Sotero Fleming MD.      Xr Chest 1 View    Result Date: 12/20/2016  Narrative: XR CHEST 1 VIEW-  CLINICAL INDICATION: Chest pain protocol.     COMPARISON: 11/07/2016.  TECHNIQUE: Single frontal view of the chest.  FINDINGS:  There is no focal alveolar infiltrate or effusion. The cardiac silhouette is normal. The pulmonary vasculature is unremarkable. There is no evidence of an acute osseous abnormality. There are no suspicious-appearing parenchymal soft tissue nodules.         Impression: No evidence of active or acute cardiopulmonary disease on today's chest radiograph.     This report was finalized on 12/20/2016 7:42 AM by Dr. Sotero Fleming MD.             ED Course  ED Course   Comment By Time   Review of records show normal echocardiogram and nuclear stress test August, 2016. Flavio Leblanc MD 01/12 1235   Workup remarkable only for hyperventilation.  With this and recent normal workup, most likely cause for her symptoms is anxiety.  He states he's been off anxiety medicine for a while.  I recommended he follow up with his doctor to restart meds. Flavio Leblanc MD 01/12 1450                  MDM  Number of Diagnoses or Management Options  Anxiety:   Chest pain, non-cardiac:      Amount and/or Complexity of Data Reviewed  Clinical lab tests: reviewed and ordered  Tests in the radiology section of CPT®: reviewed and ordered  Independent visualization of images, tracings, or specimens: yes    Risk of Complications, Morbidity, and/or Mortality  Presenting  problems: high  Diagnostic procedures: high  Management options: high        Final diagnoses:   Anxiety   Chest pain, non-cardiac            Flavio Leblanc MD  01/12/17 5233

## 2017-01-16 DIAGNOSIS — E78.49 OTHER HYPERLIPIDEMIA: ICD-10-CM

## 2017-01-16 RX ORDER — PRAVASTATIN SODIUM 40 MG
40 TABLET ORAL NIGHTLY
Qty: 30 TABLET | Refills: 5 | Status: SHIPPED | OUTPATIENT
Start: 2017-01-16 | End: 2017-06-19 | Stop reason: SDUPTHER

## 2017-01-17 NOTE — PROGRESS NOTES
Informed patient that cholesterol was still elevated and that he needed to change from 20 to 40 mg and that the rest was normal.

## 2017-01-27 ENCOUNTER — APPOINTMENT (OUTPATIENT)
Dept: GENERAL RADIOLOGY | Facility: HOSPITAL | Age: 47
End: 2017-01-27

## 2017-01-27 ENCOUNTER — HOSPITAL ENCOUNTER (EMERGENCY)
Facility: HOSPITAL | Age: 47
Discharge: HOME | End: 2017-01-27
Admitting: INTERNAL MEDICINE

## 2017-01-27 VITALS
SYSTOLIC BLOOD PRESSURE: 141 MMHG | TEMPERATURE: 97.2 F | WEIGHT: 212 LBS | DIASTOLIC BLOOD PRESSURE: 80 MMHG | OXYGEN SATURATION: 100 % | BODY MASS INDEX: 33.27 KG/M2 | HEIGHT: 67 IN | HEART RATE: 79 BPM | RESPIRATION RATE: 16 BRPM

## 2017-01-27 DIAGNOSIS — G89.29 CHRONIC MIDLINE LOW BACK PAIN WITHOUT SCIATICA: Primary | ICD-10-CM

## 2017-01-27 DIAGNOSIS — M54.50 CHRONIC MIDLINE LOW BACK PAIN WITHOUT SCIATICA: Primary | ICD-10-CM

## 2017-01-27 PROCEDURE — 25010000002 ORPHENADRINE CITRATE PER 60 MG: Performed by: PHYSICIAN ASSISTANT

## 2017-01-27 PROCEDURE — 99283 EMERGENCY DEPT VISIT LOW MDM: CPT

## 2017-01-27 PROCEDURE — 96372 THER/PROPH/DIAG INJ SC/IM: CPT

## 2017-01-27 PROCEDURE — 72110 X-RAY EXAM L-2 SPINE 4/>VWS: CPT | Performed by: RADIOLOGY

## 2017-01-27 PROCEDURE — 72110 X-RAY EXAM L-2 SPINE 4/>VWS: CPT

## 2017-01-27 RX ORDER — ORPHENADRINE CITRATE 30 MG/ML
60 INJECTION INTRAMUSCULAR; INTRAVENOUS ONCE
Status: COMPLETED | OUTPATIENT
Start: 2017-01-27 | End: 2017-01-27

## 2017-01-27 RX ADMIN — ORPHENADRINE CITRATE 60 MG: 30 INJECTION INTRAMUSCULAR; INTRAVENOUS at 03:29

## 2017-01-31 ENCOUNTER — OFFICE VISIT (OUTPATIENT)
Dept: FAMILY MEDICINE CLINIC | Facility: CLINIC | Age: 47
End: 2017-01-31

## 2017-01-31 VITALS
HEART RATE: 68 BPM | DIASTOLIC BLOOD PRESSURE: 70 MMHG | WEIGHT: 215 LBS | SYSTOLIC BLOOD PRESSURE: 115 MMHG | TEMPERATURE: 97.6 F | HEIGHT: 67 IN | OXYGEN SATURATION: 99 % | BODY MASS INDEX: 33.74 KG/M2

## 2017-01-31 DIAGNOSIS — R30.0 DYSURIA: Primary | ICD-10-CM

## 2017-01-31 DIAGNOSIS — M54.41 ACUTE BILATERAL LOW BACK PAIN WITH BILATERAL SCIATICA: ICD-10-CM

## 2017-01-31 DIAGNOSIS — K21.9 GASTROESOPHAGEAL REFLUX DISEASE WITHOUT ESOPHAGITIS: ICD-10-CM

## 2017-01-31 DIAGNOSIS — M54.42 ACUTE BILATERAL LOW BACK PAIN WITH BILATERAL SCIATICA: ICD-10-CM

## 2017-01-31 LAB
BACTERIA UR QL AUTO: ABNORMAL /HPF
BILIRUB BLD-MCNC: NEGATIVE MG/DL
BILIRUB UR QL STRIP: NEGATIVE
CLARITY UR: CLEAR
CLARITY, POC: ABNORMAL
COLOR UR: ABNORMAL
COLOR UR: YELLOW
GLUCOSE UR STRIP-MCNC: NEGATIVE MG/DL
GLUCOSE UR STRIP-MCNC: NEGATIVE MG/DL
HGB UR QL STRIP.AUTO: NEGATIVE
HYALINE CASTS UR QL AUTO: ABNORMAL /LPF
KETONES UR QL STRIP: NEGATIVE
KETONES UR QL: NEGATIVE
LEUKOCYTE EST, POC: NEGATIVE
LEUKOCYTE ESTERASE UR QL STRIP.AUTO: NEGATIVE
NITRITE UR QL STRIP: NEGATIVE
NITRITE UR-MCNC: NEGATIVE MG/ML
PH UR STRIP.AUTO: 6.5 [PH] (ref 5–8)
PH UR: 6 [PH] (ref 5–8)
PROT UR QL STRIP: NEGATIVE
PROT UR STRIP-MCNC: NEGATIVE MG/DL
RBC # UR STRIP: NEGATIVE /UL
RBC # UR: ABNORMAL /HPF
REF LAB TEST METHOD: ABNORMAL
SP GR UR STRIP: 1.02 (ref 1–1.03)
SP GR UR: 1.03 (ref 1–1.03)
SQUAMOUS #/AREA URNS HPF: ABNORMAL /HPF
UROBILINOGEN UR QL STRIP: NORMAL
UROBILINOGEN UR QL: NORMAL
WBC UR QL AUTO: ABNORMAL /HPF

## 2017-01-31 PROCEDURE — 81003 URINALYSIS AUTO W/O SCOPE: CPT | Performed by: NURSE PRACTITIONER

## 2017-01-31 PROCEDURE — 99214 OFFICE O/P EST MOD 30 MIN: CPT | Performed by: NURSE PRACTITIONER

## 2017-01-31 PROCEDURE — 81001 URINALYSIS AUTO W/SCOPE: CPT | Performed by: NURSE PRACTITIONER

## 2017-01-31 RX ORDER — DOXYCYCLINE 100 MG/1
100 CAPSULE ORAL 2 TIMES DAILY
Qty: 20 CAPSULE | Refills: 0 | Status: SHIPPED | OUTPATIENT
Start: 2017-01-31 | End: 2017-03-20

## 2017-01-31 RX ORDER — RANITIDINE 300 MG/1
300 TABLET ORAL 2 TIMES DAILY
Qty: 60 TABLET | Refills: 5 | Status: SHIPPED | OUTPATIENT
Start: 2017-01-31 | End: 2017-06-19 | Stop reason: SDUPTHER

## 2017-01-31 RX ORDER — KETOROLAC TROMETHAMINE 30 MG/ML
60 INJECTION, SOLUTION INTRAMUSCULAR; INTRAVENOUS ONCE
Status: DISCONTINUED | OUTPATIENT
Start: 2017-01-31 | End: 2017-01-31

## 2017-01-31 NOTE — MR AVS SNAPSHOT
Jaquan Mckay   1/31/2017 10:00 AM   Office Visit    Dept Phone:  [phone]   Encounter #:  [id number]    Provider:  BALDOMERO Thao   Department:  [facility]                Your Full Care Plan              Today's Medication Changes          These changes are accurate as of: 1/31/17 11:49 AM.  If you have any questions, ask your nurse or doctor.               New Medication(s)Ordered:     doxycycline 100 MG capsule   Commonly known as:  MONODOX   Take 1 capsule by mouth 2 (Two) Times a Day.   Started by:  BALDOMERO Thao         Medication(s)that have changed:     raNITIdine 300 MG tablet   Commonly known as:  ZANTAC   Take 1 tablet by mouth 2 (Two) Times a Day.   What changed:  how much to take   Changed by:  BALDOMERO Thao            Where to Get Your Medications      These medications were sent to [place], [place] - [address]  - [phone] [facility] [phone]   [address] [facility] [address]     Phone:  [phone]     doxycycline 100 MG capsule    raNITIdine 300 MG tablet                  Your Updated Medication List          This list is accurate as of: 1/31/17 11:49 AM.  Always use your most recent med list.                albuterol 108 (90 BASE) MCG/ACT inhaler   Commonly known as:  PROVENTIL HFA;VENTOLIN HFA   Inhale 2 puffs Every 4 (Four) Hours As Needed for shortness of air.       baclofen 10 MG tablet   Commonly known as:  LIORESAL   Take 1 tablet by mouth 3 (Three) Times a Day.       budesonide-formoterol 160-4.5 MCG/ACT inhaler   Commonly known as:  SYMBICORT   Inhale 2 puffs 2 (Two) Times a Day.       * dicyclomine 10 MG capsule   Commonly known as:  BENTYL   Take 1 capsule by mouth 4 (Four) Times a Day As Needed (abdominal pain).       * dicyclomine 20 MG tablet   Commonly known as:  BENTYL   Take 1 tablet by mouth 3 (Three) Times a Day.       DILANTIN 100 MG ER capsule   Generic drug:   phenytoin   Brand name necessary       docusate calcium 240 MG capsule   Commonly known as:  SURFAK   Take 1 capsule by mouth 2 (Two) Times a Day.       doxycycline 100 MG capsule   Commonly known as:  MONODOX   Take 1 capsule by mouth 2 (Two) Times a Day.       Elbow Brace misc   1 each daily.       lisinopril 20 MG tablet   Commonly known as:  PRINIVIL,ZESTRIL   Take 1 tablet by mouth daily.       loratadine 10 MG tablet   Commonly known as:  CLARITIN   Take 1 tablet by mouth daily as needed for allergies.       meclizine 25 MG tablet   Commonly known as:  ANTIVERT   Take 1 tablet by mouth 3 (Three) Times a Day As Needed for dizziness.       metoclopramide 10 MG tablet   Commonly known as:  REGLAN   Take 1 tablet by mouth 2 (two) times a day before meals.       metoprolol succinate XL 25 MG 24 hr tablet   Commonly known as:  TOPROL-XL   Take 1 tablet by mouth daily.       montelukast 10 MG tablet   Commonly known as:  SINGULAIR   Take 1 tablet by mouth Every Night.       pantoprazole 40 MG EC tablet   Commonly known as:  PROTONIX   Take 1 tablet by mouth Daily.       pravastatin 40 MG tablet   Commonly known as:  PRAVACHOL   Take 1 tablet by mouth Every Night.       promethazine 25 MG tablet   Commonly known as:  PHENERGAN       promethazine-dextromethorphan 6.25-15 MG/5ML syrup   Commonly known as:  PROMETHAZINE-DM   Take 5 mL by mouth 4 (Four) Times a Day As Needed for cough.       raNITIdine 300 MG tablet   Commonly known as:  ZANTAC   Take 1 tablet by mouth 2 (Two) Times a Day.       sodium chloride 0.65 % nasal spray   Commonly known as:  OCEAN   2 sprays into each nostril As Needed for congestion.       trifluoperazine 2 MG tablet   Commonly known as:  STELAZINE   Take 1 tablet by mouth 2 (two) times a day.       VESICARE 5 MG tablet   Generic drug:  solifenacin       * Notice:  This list has 2 medication(s) that are the same as other medications prescribed for you. Read the directions carefully, and ask  your doctor or other care provider to review them with you.            We Performed the Following     Back Brace     POC Urinalysis Dipstick, Automated     Urinalysis With / Microscopic If Indicated     Urinalysis With Microscopic       You Were Diagnosed With        Codes Comments    Dysuria    -  Primary ICD-10-CM: R30.0  ICD-9-CM: 788.1     Acute bilateral low back pain with bilateral sciatica     ICD-10-CM: M54.42, M54.41  ICD-9-CM: 724.2, 724.3     Gastroesophageal reflux disease without esophagitis     ICD-10-CM: K21.9  ICD-9-CM: 530.81       Medications to be Given to You by a Medical Professional       Instructions     None    Patient Instructions History      Upcoming Appointments     Visit Type Date Time Department    OFFICE VISIT 1/31/2017 10:00 AM [facility]    OFFICE VISIT 2/13/2017 11:20 AM [facility]      [employer] Signup     Our records indicate that you have declined [facility] "[employer]t signup. If you would like to sign up for [employer], please email [email or web address] or call [phone] to obtain an activation code.             Other Info from Your Visit           Your Appointments     Feb 13, 2017 11:20 AM EST   Office Visit with BALDOMERO Thao   [facility] (--)    [address] [place] [id number] [phone]           Please arrive 10 minutes early, bring a complete list of all medications and bring any previous records or diagnostic testing results.              Allergies     Paxil [Paroxetine Hcl]  Shortness Of Breath    Chest pain     Ciprofloxacin      Contrast Dye      Mobic [Meloxicam]      Penicillins      Prednisone      Robitussin Cough+ Chest Max St  [Dextromethorphan-guaifenesin]      Sulfa Antibiotics      Zoloft [Sertraline Hcl]  Hives, Itching    Keflex [Cephalexin]  Rash      Reason for Visit     Back Pain           Vital Signs     Blood Pressure Pulse Temperature Height Weight Oxygen Saturation     "115/70 (BP Location: Left arm, Patient Position: Sitting) 68 97.6 °F (36.4 °C) (Tympanic) 67\" (170.2 cm) 215 lb (97.5 kg) 99%    Body Mass Index Smoking Status                33.67 kg/m2 Current Every Day Smoker          Problems and Diagnoses Noted     Acid reflux disease    Difficult or painful urination    -  Primary    Acute bilateral low back pain with bilateral sciatica          Results     POC Urinalysis Dipstick, Automated      Component Value Standard Range & Units    Color Madiha Yellow, Straw, Dark Yellow, Madiha    Clarity, UA Slightly Cloudy Clear    Glucose, UA Negative Negative, 1000 mg/dL (3+) mg/dL    Bilirubin Negative Negative    Ketones, UA Negative Negative    Specific Gravity  1.030 1.005 - 1.030    Blood, UA Negative Negative    pH, Urine 6.0 5.0 - 8.0    Protein, POC Negative Negative mg/dL    Urobilinogen, UA Normal Normal    Leukocytes Negative Negative    Nitrite, UA Negative Negative                    "

## 2017-01-31 NOTE — PROGRESS NOTES
"Subjective   Jaquan Mckay is a 46 y.o. male.     Chief Complaint   Patient presents with   • Back Pain       History of Present Illness     Back pain-present for several day.  Has been to ED for eval. Reports pain occurred for no reason.  He got up to walk, felt a sharp sensation in his back that radiated down his bilateral legs, and continues to be present.  Pain is constantly present, rates at a 9-9.5 at present and has been using Motrin and \"Tylenol 3\".  He reports he was diagnosed with muscle spasms.  He reports sensation of \"like a hot knife\".  He reports even his hips are sore today.  He reports he has made a chiropractor appt for further eval later today.  Request a back support.  Not at goal.   Dysuria-several days.  Dark discoloration with LBP and burning.  Not at goal.   GERD-ongoing.  Request refill on meds today.     The following portions of the patient's history were reviewed and updated as appropriate: allergies, current medications, past family history, past medical history, past social history, past surgical history and problem list.    Review of Systems   Constitutional: Negative for appetite change.   Respiratory: Negative for shortness of breath.    Cardiovascular: Negative for chest pain.   Gastrointestinal: Negative for diarrhea.   Genitourinary: Positive for dysuria, testicular pain and urgency. Negative for hematuria.   Musculoskeletal: Positive for arthralgias, back pain and myalgias.   Neurological: Positive for numbness (in BLE since onset of back pain).   Psychiatric/Behavioral: The patient is nervous/anxious.    All other systems reviewed and are negative.      Objective     Visit Vitals   • /70 (BP Location: Left arm, Patient Position: Sitting)   • Pulse 68   • Temp 97.6 °F (36.4 °C) (Tympanic)   • Ht 67\" (170.2 cm)   • Wt 215 lb (97.5 kg)   • SpO2 99%   • BMI 33.67 kg/m2       Physical Exam   Constitutional: He is oriented to person, place, and time. He appears well-developed " and well-nourished. No distress.   HENT:   Head: Normocephalic and atraumatic.   Right Ear: External ear normal.   Left Ear: External ear normal.   Nose: Nose normal.   Mouth/Throat: Oropharynx is clear and moist.   Eyes: Conjunctivae and EOM are normal. Pupils are equal, round, and reactive to light.   Neck: Normal range of motion. Neck supple. No thyromegaly present.   Cardiovascular: Normal rate, regular rhythm and normal heart sounds.    No murmur heard.  Pulmonary/Chest: Effort normal and breath sounds normal. He exhibits no tenderness.   Abdominal: Soft. Bowel sounds are normal. He exhibits no mass. There is no tenderness.   Musculoskeletal: He exhibits no edema.        Right elbow: He exhibits decreased range of motion (chronic). He exhibits no effusion and no deformity. Tenderness found. Medial epicondyle (that radiates upward toward the inner part of arm) tenderness noted.        Left knee: He exhibits decreased range of motion and bony tenderness. He exhibits no swelling and normal alignment.        Lumbar back: He exhibits decreased range of motion, tenderness (3-4+), pain (to light touch ) and spasm.   Gait shuffling with limping noted.  More prominent on right side from behind.       Skin Integrity  -  His right foot skin is intact.   Jaquan's left foot skin is intact. .  Neurological: He is alert and oriented to person, place, and time. He has normal reflexes.   Skin: Skin is warm and dry.   Psychiatric: His behavior is normal. Judgment and thought content normal. His mood appears anxious. He exhibits a depressed mood. He expresses no homicidal and no suicidal ideation. He exhibits normal recent memory and normal remote memory.   Nursing note and vitals reviewed.      Assessment/Plan     Jaquan was seen today for back pain.    Diagnoses and all orders for this visit:    Dysuria  -     POC Urinalysis Dipstick, Automated  -     doxycycline (MONODOX) 100 MG capsule; Take 1 capsule by mouth 2 (Two) Times a  Day.  -     Cancel: Urinalysis With / Microscopic If Indicated  -     Urinalysis With Microscopic  -     Urinalysis  -     Urinalysis, Microscopic Only    Acute bilateral low back pain with bilateral sciatica  -     Back Brace  -     Discontinue: ketorolac (TORADOL) injection 60 mg; Inject 60 mg into the shoulder, thigh, or buttocks 1 (One) Time.    Gastroesophageal reflux disease without esophagitis  Comments:  chronic.  Refill on meds today.  Avoid triggers of GI discomfort  Orders:  -     raNITIdine (ZANTAC) 300 MG tablet; Take 1 tablet by mouth 2 (Two) Times a Day.      Disregard order for Toradol.  Office is out of medication  Body mechanics reviewed.  Avoid overuse of back.  Maintain correct posture  Will plan further imaging of back if warranted.   Understands disease processes and need for medications.  Understands reasons for urgent and emergent care.  Patient (& family) verbalized agreement for treatment plan.   Will send urine for micro and C&S  Advised to push fluids.   Will further discuss weight loss at next visit.   RTC 2 weeks, sooner if needed.

## 2017-02-13 ENCOUNTER — OFFICE VISIT (OUTPATIENT)
Dept: FAMILY MEDICINE CLINIC | Facility: CLINIC | Age: 47
End: 2017-02-13

## 2017-02-13 VITALS
BODY MASS INDEX: 33.9 KG/M2 | HEART RATE: 66 BPM | DIASTOLIC BLOOD PRESSURE: 70 MMHG | HEIGHT: 67 IN | SYSTOLIC BLOOD PRESSURE: 120 MMHG | OXYGEN SATURATION: 99 % | WEIGHT: 216 LBS | TEMPERATURE: 97.4 F

## 2017-02-13 DIAGNOSIS — M54.50 ACUTE BILATERAL LOW BACK PAIN WITHOUT SCIATICA: Primary | ICD-10-CM

## 2017-02-13 DIAGNOSIS — F41.9 ANXIETY: ICD-10-CM

## 2017-02-13 DIAGNOSIS — E66.9 OBESITY (BMI 30.0-34.9): ICD-10-CM

## 2017-02-13 PROCEDURE — 99214 OFFICE O/P EST MOD 30 MIN: CPT | Performed by: NURSE PRACTITIONER

## 2017-02-13 RX ORDER — METHOCARBAMOL 750 MG/1
750 TABLET, FILM COATED ORAL 3 TIMES DAILY PRN
Qty: 90 TABLET | Refills: 5 | Status: SHIPPED | OUTPATIENT
Start: 2017-02-13 | End: 2017-11-13

## 2017-02-13 RX ORDER — HYDROCODONE BITARTRATE AND ACETAMINOPHEN 5; 325 MG/1; MG/1
1 TABLET ORAL EVERY 8 HOURS PRN
Qty: 45 TABLET | Refills: 0 | Status: SHIPPED | OUTPATIENT
Start: 2017-02-13 | End: 2017-03-20 | Stop reason: SDUPTHER

## 2017-02-13 NOTE — PROGRESS NOTES
"Subjective   Jaquan Mckay is a 46 y.o. male.     Chief Complaint   Patient presents with   • Back Pain       History of Present Illness     Back pain-ongoing since end of January. Has been to ED for eval. Reports pain occurred for no reason. He got up to walk, felt a sharp sensation in his back that radiated down his bilateral legs, and continues to be present in his low back and shooting pain down into both of his legs. He reports he was diagnosed with muscle spasms at the ED.  He reports even his hips continue to be sore. He reports he has made a chiropractor appt for further eval and they were uncertain what was causing him to have so much pain. Request a back support but provides a number today for more clinical information to be sent to.  He is taking Tylenol and Motrin without any change.  He reports his back pain continues to be a 9-10.  He reports that his pain is causing him to \"take 2 hours just to get ready.\"  He reports pain with bending over and with position changes such as sit to stand and stand to sit.  He reports some difficulty when he has to turn his head to look back.  Not at goal.   Obesity-ongoing problem. On weight loss management. Weight gain noted since last visit. He continues to watch his diet and exercise as he is able.  But reports his appetite has increased due to stress and anxiety.  He would like to resume adipex.  Not at goal.  Left Knee pain-ongoing.  Has been referred to orthopedic.  He has had an MRI done approx 2 weeks ago.  He has a follow up on 2/20 for test results. He is hoping for surgical intervention as that seemed to help his right knee in the past.  Not at goal.     The following portions of the patient's history were reviewed and updated as appropriate: allergies, current medications, past family history, past medical history, past social history, past surgical history and problem list.    Review of Systems   Constitutional: Positive for appetite change (increase) and " "fatigue. Negative for fever.   HENT: Negative for congestion, postnasal drip and sinus pressure.    Respiratory: Positive for cough. Negative for shortness of breath.    Cardiovascular: Negative for chest pain (none in approx 1 month).   Gastrointestinal: Positive for abdominal pain. Negative for diarrhea.   Genitourinary: Positive for dysuria (chronic), testicular pain and urgency. Negative for hematuria.   Musculoskeletal: Positive for arthralgias, back pain (with radiation to BLE \"down to ankles\") and myalgias.   Neurological: Positive for weakness (BLE) and headaches (intermittent.  Reports \"3 last week\" but did not sleep). Negative for dizziness and numbness.   Psychiatric/Behavioral: Positive for dysphoric mood and sleep disturbance (due to discomfort). Negative for suicidal ideas. The patient is nervous/anxious.    All other systems reviewed and are negative.      Objective     Visit Vitals   • /70 (BP Location: Left arm, Patient Position: Sitting)   • Pulse 66   • Temp 97.4 °F (36.3 °C) (Tympanic)   • Ht 67\" (170.2 cm)   • Wt 216 lb (98 kg)   • SpO2 99%   • BMI 33.83 kg/m2       Physical Exam   Constitutional: He is oriented to person, place, and time. He appears well-developed and well-nourished. No distress.   HENT:   Head: Normocephalic and atraumatic.   Right Ear: External ear normal.   Left Ear: External ear normal.   Nose: Nose normal.   Mouth/Throat: Oropharynx is clear and moist.   Eyes: Conjunctivae and EOM are normal. Pupils are equal, round, and reactive to light.   Neck: Normal range of motion. Neck supple. No thyromegaly present.   Cardiovascular: Normal rate, regular rhythm and normal heart sounds.    No murmur heard.  Pulmonary/Chest: Effort normal and breath sounds normal. He exhibits no tenderness.   Abdominal: Soft. Bowel sounds are normal. He exhibits no mass. There is no tenderness.   Musculoskeletal: He exhibits no edema.        Right elbow: He exhibits decreased range of motion " (chronic). He exhibits no effusion and no deformity. Tenderness found. Medial epicondyle (that radiates upward toward the inner part of arm) tenderness noted.        Left knee: He exhibits decreased range of motion and bony tenderness. He exhibits no swelling and normal alignment.        Lumbar back: He exhibits decreased range of motion, tenderness (3-4+), pain (to light touch ) and spasm.   Gait shuffling with limping noted.  More prominent on right side from behind.       Skin Integrity  -  His right foot skin is not intact.   Jaquan's left foot skin is not intact. .  Neurological: He is alert and oriented to person, place, and time. He has normal reflexes.   Skin: Skin is warm and dry.   Psychiatric: His behavior is normal. Judgment and thought content normal. His mood appears anxious. He exhibits a depressed mood. He expresses no homicidal and no suicidal ideation. He exhibits normal recent memory and normal remote memory.   Nursing note and vitals reviewed.      Assessment/Plan     Jaquan was seen today for back pain.    Diagnoses and all orders for this visit:    Acute bilateral low back pain without sciatica  -     Ambulatory Referral to Physical Therapy Evaluate and treat (back pain)  -     HYDROcodone-acetaminophen (NORCO) 5-325 MG per tablet; Take 1 tablet by mouth Every 8 (Eight) Hours As Needed for moderate pain (4-6).  -     methocarbamol (ROBAXIN) 750 MG tablet; Take 1 tablet by mouth 3 (Three) Times a Day As Needed for muscle spasms.  -     Urine Drug Screen    Obesity (BMI 30.0-34.9)  Comments:  ongoing.  will consider Adipex after cleared by Cardio for CP    Anxiety  Comments:  ongoing.  Provider will review comp care records to see what meds have been failed.     UDS requested per Kilo while patient in office today.  Patient reports he has taken Norco for pain from the dentist.  Will prescribe a short supply today.  UDS today.   Jay reviewed.    Understands disease processes and need for  medications.  Understands reasons for urgent and emergent care.  Patient (& family) verbalized agreement for treatment plan.   Norco written for PRN use.  Understands risk of use.    PT eval ordered.   Change in muscle relaxants today for muscle spasm  RTC 2-3 weeks, sooner if needed.

## 2017-02-27 ENCOUNTER — OFFICE VISIT (OUTPATIENT)
Dept: FAMILY MEDICINE CLINIC | Facility: CLINIC | Age: 47
End: 2017-02-27

## 2017-02-27 VITALS
HEART RATE: 74 BPM | WEIGHT: 217 LBS | OXYGEN SATURATION: 99 % | TEMPERATURE: 98.3 F | SYSTOLIC BLOOD PRESSURE: 115 MMHG | HEIGHT: 67 IN | BODY MASS INDEX: 34.06 KG/M2 | DIASTOLIC BLOOD PRESSURE: 75 MMHG

## 2017-02-27 DIAGNOSIS — M54.41 ACUTE BILATERAL LOW BACK PAIN WITH BILATERAL SCIATICA: Primary | ICD-10-CM

## 2017-02-27 DIAGNOSIS — I10 ESSENTIAL HYPERTENSION: ICD-10-CM

## 2017-02-27 DIAGNOSIS — M54.42 ACUTE BILATERAL LOW BACK PAIN WITH BILATERAL SCIATICA: Primary | ICD-10-CM

## 2017-02-27 DIAGNOSIS — Z98.890 HISTORY OF BACK SURGERY: ICD-10-CM

## 2017-02-27 PROCEDURE — 99214 OFFICE O/P EST MOD 30 MIN: CPT | Performed by: NURSE PRACTITIONER

## 2017-02-27 NOTE — PROGRESS NOTES
"Subjective   Jaquan Mckay is a 46 y.o. male.     Chief Complaint   Patient presents with   • Back Pain       History of Present Illness     Back pain-ongoing since end of January.  He reports even his hips continue to be sore. He reports he has made a chiropractor appt for further eval and they were uncertain what was causing him to have so much pain. Request a back support but provides a number today for more clinical information to be sent to. He is taking Tylenol and Motrin without any change. He reports his back pain continues to be a 9-10. He reports that his pain is causing him to \"take 2 hours just to get ready.\" He reports pain with bending over and with position changes such as sit to stand and stand to sit. He reports some difficulty when he has to turn his head to look back.   He reports pain is across both hip bones, center of his back, and radiating down toward his anal opening.  He reports persistent discomfort.  He reports he was seen at [facility] approx 2 weeks ago.  (17th).  PT is suppose to call him on 3/1/17.  He reports today the pain is in the area of a previous \"spine surgery\".  He has not used any Robaxin as he had misplaced it but reports he did find this morning and does plan to use.  He is taking Norco but it does not seem to be effective.  Not at goal.   Low blood sugars-He reports he was weak, dizzy, headache.  Nausea and visual changes.  He reports it took something sweet and some peanut butter before the symptoms improved.  He reports the symptoms lasted approx 2-3 hours before he felt some better.  He reports his dietary intake had been prior to the episode.  Not at goal.   Multiple joint complaints-left knee, right elbow mostly. Reports a twisting injury to his left knee and it is now trying to \"lock up\". He reports ongoing edema at times and \"it is going out like my right one did before the surgery.\" Pain is exacerbated by walking and trying to work out. He reports \"electical " "sensation\" in his arm with pressure over his elbow. He request a referral to [place] to orthopedic for further eval. He was seen by Dr Pritchett recently.  He reports he wanted to order further testing but he is unable to lay on his abdomen 30-40 minutes for a scan.  Not at goal.    The following portions of the patient's history were reviewed and updated as appropriate: allergies, current medications, past family history, past medical history, past social history, past surgical history and problem list.    Review of Systems   Constitutional: Positive for appetite change (increase) and fatigue. Negative for fever.   HENT: Negative for congestion, postnasal drip and sinus pressure.    Eyes: Positive for visual disturbance (wears glasses.  plans to sched a new appt).   Respiratory: Positive for cough. Negative for shortness of breath.    Cardiovascular: Negative for chest pain (none in approx 1 month).   Gastrointestinal: Positive for abdominal pain. Negative for diarrhea.   Genitourinary: Positive for dysuria (chronic), testicular pain and urgency. Negative for hematuria.   Musculoskeletal: Positive for arthralgias, back pain (with radiation to BLE \"down to ankles\") and myalgias.   Neurological: Positive for weakness (BLE), numbness (and tingling with \"electricity\" sensation in his legs) and headaches (intermittent.  Reports \"3 last week\" but did not sleep). Negative for dizziness.   Psychiatric/Behavioral: Positive for dysphoric mood and sleep disturbance (due to discomfort). Negative for suicidal ideas. The patient is nervous/anxious.    All other systems reviewed and are negative.      Objective     Visit Vitals   • /75 (BP Location: Left arm, Patient Position: Sitting)   • Pulse 74   • Temp 98.3 °F (36.8 °C) (Tympanic)   • Ht 67\" (170.2 cm)   • Wt 217 lb (98.4 kg)   • SpO2 99%   • BMI 33.99 kg/m2       Physical Exam   Constitutional: He is oriented to person, place, and time. He appears well-developed and " well-nourished. No distress.   HENT:   Head: Normocephalic and atraumatic.   Right Ear: External ear normal.   Left Ear: External ear normal.   Nose: Nose normal.   Mouth/Throat: Oropharynx is clear and moist.   Eyes: Conjunctivae and EOM are normal. Pupils are equal, round, and reactive to light.   Neck: Normal range of motion. Neck supple. No thyromegaly present.   Cardiovascular: Normal rate, regular rhythm and normal heart sounds.    No murmur heard.  Pulmonary/Chest: Effort normal and breath sounds normal. He exhibits no tenderness.   Abdominal: Soft. Bowel sounds are normal. He exhibits no mass. There is no tenderness.   Musculoskeletal: He exhibits no edema.        Right elbow: He exhibits decreased range of motion (chronic). He exhibits no effusion and no deformity. Tenderness found. Medial epicondyle (that radiates upward toward the inner part of arm) tenderness noted.        Left knee: He exhibits decreased range of motion and bony tenderness. He exhibits no swelling and normal alignment.        Lumbar back: He exhibits decreased range of motion, tenderness (3-4+), pain (to light touch ) and spasm.   Gait shuffling with limping noted.  More prominent on right side from behind.       Skin Integrity  -  His right foot skin is intact.     Jaquan 's left foot skin is intact. .  Neurological: He is alert and oriented to person, place, and time. He has normal reflexes.   Skin: Skin is warm and dry.   Psychiatric: His behavior is normal. Judgment and thought content normal. His mood appears anxious. He exhibits a depressed mood. He expresses no homicidal and no suicidal ideation. He exhibits normal recent memory and normal remote memory.   Nursing note and vitals reviewed.      Assessment/Plan     Jaquan was seen today for back pain.    Diagnoses and all orders for this visit:    Acute bilateral low back pain with bilateral sciatica  -     MRI Lumbar Spine Without Contrast; Future    History of back surgery  -      MRI Lumbar Spine Without Contrast; Future    Essential hypertension  Comments:  stable    will plan for imaging of lumbar area due to pain and numbness with suspected nerve involvement  Heat/Ice alternating.  Do not apply directly to skin.  Gentle stretching of area and massage.  Avoid overuse or activities that exacerbate.  PRN muscle relaxants.  May take 1.5 tablet of Norco as he is only using QHS most days.  Understands disease processes and need for medications.  Understands reasons for urgent and emergent care.  Patient (& family) verbalized agreement for treatment plan.   Continue other meds as ordered.  RTC PRN 3-5 days for worsening or non resolving symptoms  Will plan to see patient back in office after imaging is scheduled and done.

## 2017-03-20 ENCOUNTER — TELEPHONE (OUTPATIENT)
Dept: FAMILY MEDICINE CLINIC | Facility: CLINIC | Age: 47
End: 2017-03-20

## 2017-03-20 ENCOUNTER — OFFICE VISIT (OUTPATIENT)
Dept: FAMILY MEDICINE CLINIC | Facility: CLINIC | Age: 47
End: 2017-03-20

## 2017-03-20 VITALS
HEIGHT: 67 IN | DIASTOLIC BLOOD PRESSURE: 70 MMHG | HEART RATE: 78 BPM | OXYGEN SATURATION: 98 % | BODY MASS INDEX: 34.37 KG/M2 | TEMPERATURE: 98.1 F | SYSTOLIC BLOOD PRESSURE: 115 MMHG | WEIGHT: 219 LBS

## 2017-03-20 DIAGNOSIS — R07.89 OTHER CHEST PAIN: ICD-10-CM

## 2017-03-20 DIAGNOSIS — J45.901 ASTHMA EXACERBATION: ICD-10-CM

## 2017-03-20 DIAGNOSIS — M54.41 ACUTE BILATERAL LOW BACK PAIN WITH BILATERAL SCIATICA: ICD-10-CM

## 2017-03-20 DIAGNOSIS — M51.36 BULGING OF LUMBAR INTERVERTEBRAL DISC: Primary | ICD-10-CM

## 2017-03-20 DIAGNOSIS — M54.42 ACUTE BILATERAL LOW BACK PAIN WITH BILATERAL SCIATICA: ICD-10-CM

## 2017-03-20 PROCEDURE — 99214 OFFICE O/P EST MOD 30 MIN: CPT | Performed by: NURSE PRACTITIONER

## 2017-03-20 RX ORDER — DEXTROMETHORPHAN HYDROBROMIDE AND PROMETHAZINE HYDROCHLORIDE 15; 6.25 MG/5ML; MG/5ML
5 SYRUP ORAL 4 TIMES DAILY PRN
Qty: 180 ML | Refills: 0 | Status: SHIPPED | OUTPATIENT
Start: 2017-03-20 | End: 2017-04-19

## 2017-03-20 RX ORDER — ALBUTEROL SULFATE 90 UG/1
2 AEROSOL, METERED RESPIRATORY (INHALATION) EVERY 4 HOURS PRN
Qty: 1 INHALER | Refills: 0 | Status: SHIPPED | OUTPATIENT
Start: 2017-03-20 | End: 2017-07-17 | Stop reason: SDUPTHER

## 2017-03-20 RX ORDER — HYDROCODONE BITARTRATE AND ACETAMINOPHEN 5; 325 MG/1; MG/1
1 TABLET ORAL EVERY 8 HOURS PRN
Qty: 45 TABLET | Refills: 0 | Status: SHIPPED | OUTPATIENT
Start: 2017-03-20 | End: 2017-04-19 | Stop reason: SDUPTHER

## 2017-03-20 RX ORDER — DEXTROMETHORPHAN HYDROBROMIDE AND PROMETHAZINE HYDROCHLORIDE 15; 6.25 MG/5ML; MG/5ML
5 SYRUP ORAL 4 TIMES DAILY PRN
Qty: 180 ML | Refills: 0 | Status: SHIPPED | OUTPATIENT
Start: 2017-03-20 | End: 2017-03-20 | Stop reason: SDUPTHER

## 2017-03-20 RX ORDER — AZITHROMYCIN 250 MG/1
TABLET, FILM COATED ORAL
Qty: 6 TABLET | Refills: 0 | OUTPATIENT
Start: 2017-03-20 | End: 2017-04-06

## 2017-03-20 NOTE — PROGRESS NOTES
"Subjective   Jaquan Mckay is a 46 y.o. male.     Chief Complaint   Patient presents with   • Cough   • Sneezing   • Nasal Congestion       History of Present Illness     URI ongoing for approx 1 week.  He reports SOA that is worse at night.  Nasal congestion and rhinorrhea with sinus pressure.  He reports persistent coughing.  Cough is dry and non-productive.  Some blood tinge to secretions when \"I blow nose.\"  He has not taken any meds at home for his symptoms.  Not at goal.   Back pain-ongoing.  Has not heard from PT and reports the pain continues to increase.  He is wearing back brace today.  He reports pain with bending over and with position changes such as sit to stand and stand to sit. He reports some difficulty when he has to turn his head to look back. He reports pain is across both hip bones, center of his back, and radiating down toward his anal opening. He reports persistent discomfort.    The following portions of the patient's history were reviewed and updated as appropriate: allergies, current medications, past family history, past medical history, past social history, past surgical history and problem list.    Review of Systems   Constitutional: Positive for appetite change (increase) and fatigue. Negative for fever.   HENT: Positive for congestion, ear pain, postnasal drip, rhinorrhea, sinus pressure and sore throat.    Eyes: Negative for pain and visual disturbance (wears glasses.  recent eye exam with new glasses).   Respiratory: Positive for cough, chest tightness and shortness of breath.    Cardiovascular: Negative for chest pain (none in approx 1 month).   Gastrointestinal: Positive for abdominal pain. Negative for diarrhea.   Genitourinary: Positive for dysuria (chronic) and testicular pain (chronic). Negative for hematuria and urgency.   Musculoskeletal: Positive for arthralgias, back pain (with radiation to BLE \"down to ankles\" ) and myalgias.   Neurological: Positive for weakness (BLE), " "numbness (and tingling with \"electricity\" sensation in his legs) and headaches (intermittent.  Reports \"3 last week\" but did not sleep). Negative for dizziness.   Psychiatric/Behavioral: Positive for dysphoric mood and sleep disturbance (due to discomfort). Negative for suicidal ideas. The patient is nervous/anxious.    All other systems reviewed and are negative.      Objective     Visit Vitals   • /70 (BP Location: Left arm, Patient Position: Sitting)   • Pulse 78   • Temp 98.1 °F (36.7 °C) (Tympanic)   • Ht 67\" (170.2 cm)   • Wt 219 lb (99.3 kg)   • SpO2 98%   • BMI 34.3 kg/m2       Physical Exam   Constitutional: He is oriented to person, place, and time. He appears well-developed and well-nourished. No distress.   HENT:   Head: Normocephalic and atraumatic.   Right Ear: External ear normal. Tympanic membrane is injected. A middle ear effusion is present.   Left Ear: External ear normal. Tympanic membrane is injected and retracted. A middle ear effusion is present.   Nose: Mucosal edema and rhinorrhea present. Right sinus exhibits maxillary sinus tenderness and frontal sinus tenderness. Left sinus exhibits maxillary sinus tenderness and frontal sinus tenderness.   Mouth/Throat: Oropharyngeal exudate and posterior oropharyngeal erythema present. Tonsils are 0 on the right. Tonsils are 0 on the left. Tonsillar exudate.   Eyes: Conjunctivae and EOM are normal. Pupils are equal, round, and reactive to light. No scleral icterus.   Neck: Normal range of motion. Neck supple. No thyromegaly present.   Cardiovascular: Normal rate, regular rhythm and normal heart sounds.    No murmur heard.  Pulmonary/Chest: Effort normal. No respiratory distress. He has wheezes (scattered diffuse). He exhibits no tenderness.   Abdominal: Soft. Bowel sounds are normal. He exhibits no mass. There is no tenderness.   Musculoskeletal: He exhibits no edema.        Right elbow: He exhibits decreased range of motion (chronic). He exhibits " no effusion and no deformity. Tenderness found. Medial epicondyle (that radiates upward toward the inner part of arm) tenderness noted.        Left knee: He exhibits decreased range of motion and bony tenderness. He exhibits no swelling and normal alignment.        Lumbar back: He exhibits decreased range of motion, tenderness (3-4+), pain (to light touch ) and spasm.   Gait shuffling with limping noted.  More prominent on right side from behind.       Skin Integrity  -  His right foot skin is intact.     Jaquan 's left foot skin is intact. .  Lymphadenopathy:        Head (right side): No submandibular adenopathy present.        Head (left side): No submandibular adenopathy present.     He has cervical adenopathy.        Right cervical: Superficial cervical adenopathy present.        Left cervical: Superficial cervical adenopathy present.   Neurological: He is alert and oriented to person, place, and time. He has normal reflexes. No cranial nerve deficit. Coordination normal.   Skin: Skin is warm and dry.   Psychiatric: His behavior is normal. Judgment and thought content normal. His mood appears anxious. He exhibits a depressed mood. He expresses no homicidal and no suicidal ideation. He exhibits normal recent memory and normal remote memory.   Vitals reviewed.      Assessment/Plan     Jaquan was seen today for cough, sneezing and nasal congestion.    Diagnoses and all orders for this visit:    Bulging of lumbar intervertebral disc  -     Ambulatory Referral to Neurosurgery    Acute bilateral low back pain with bilateral sciatica  -     HYDROcodone-acetaminophen (NORCO) 5-325 MG per tablet; Take 1 tablet by mouth Every 8 (Eight) Hours As Needed for Moderate Pain (4-6).  -     Ambulatory Referral to Orthopedic Surgery  -     Ambulatory Referral to Neurosurgery    Asthma exacerbation  -     Discontinue: promethazine-dextromethorphan (PROMETHAZINE-DM) 6.25-15 MG/5ML syrup; Take 5 mL by mouth 4 (Four) Times a Day As  Needed for Cough.  -     albuterol (PROVENTIL HFA;VENTOLIN HFA) 108 (90 BASE) MCG/ACT inhaler; Inhale 2 puffs Every 4 (Four) Hours As Needed for Shortness of Air.  -     azithromycin (ZITHROMAX) 250 MG tablet; Take 2 tablets the first day, then 1 tablet daily for 4 days.  -     promethazine-dextromethorphan (PROMETHAZINE-DM) 6.25-15 MG/5ML syrup; Take 5 mL by mouth 4 (Four) Times a Day As Needed for Cough.    Other chest pain  Comments:  ongoing.  will refer for further eval.  Orders:  -     Ambulatory Referral to Cardiology    Request a one time fill of his meds to [facility] due to not wanting to travel to [facility].  He reports he will get his norco there as well.   Instructed to complete all of antibiotics for acute illness.  Increase PO fluids, avoid caffeine.  Do not save meds for later use.  Rest PRN  Referral as above to speciality.  CHAVEZ reviewed today and consistent.  Will refill prescribed controlled medication today.  PT has waiting list (office staff called to verify).  Will attempt to find closer placement  Understands disease processes and need for medications.  Understands reasons for urgent and emergent care.  Patient (& family) verbalized agreement for treatment plan.   Reviewed CT scan from [facility].  RTC 1 month.  Sooner if needed.

## 2017-03-28 DIAGNOSIS — G40.802 OTHER EPILEPSY WITHOUT STATUS EPILEPTICUS, NOT INTRACTABLE (HCC): ICD-10-CM

## 2017-03-28 DIAGNOSIS — K21.9 GASTROESOPHAGEAL REFLUX DISEASE WITHOUT ESOPHAGITIS: ICD-10-CM

## 2017-03-29 ENCOUNTER — APPOINTMENT (OUTPATIENT)
Dept: GENERAL RADIOLOGY | Facility: HOSPITAL | Age: 47
End: 2017-03-29

## 2017-03-29 ENCOUNTER — TRANSCRIBE ORDERS (OUTPATIENT)
Dept: PHYSICAL THERAPY | Facility: HOSPITAL | Age: 47
End: 2017-03-29

## 2017-03-29 ENCOUNTER — HOSPITAL ENCOUNTER (OUTPATIENT)
Dept: PHYSICAL THERAPY | Facility: HOSPITAL | Age: 47
Setting detail: THERAPIES SERIES
Discharge: HOME | End: 2017-03-29

## 2017-03-29 DIAGNOSIS — M54.50 ACUTE BILATERAL LOW BACK PAIN WITHOUT SCIATICA: Primary | ICD-10-CM

## 2017-03-29 DIAGNOSIS — M54.42 ACUTE BILATERAL LOW BACK PAIN WITH LEFT-SIDED SCIATICA: Primary | ICD-10-CM

## 2017-03-29 LAB
ALBUMIN SERPL-MCNC: 4.4 G/DL (ref 3.5–5)
ALBUMIN/GLOB SERPL: 1.2 G/DL (ref 1.5–2.5)
ALP SERPL-CCNC: 92 U/L (ref 40–129)
ALT SERPL W P-5'-P-CCNC: 18 U/L (ref 10–44)
ANION GAP SERPL CALCULATED.3IONS-SCNC: 5.5 MMOL/L (ref 3.6–11.2)
AST SERPL-CCNC: 22 U/L (ref 10–34)
BASOPHILS # BLD AUTO: 0.01 10*3/MM3 (ref 0–0.3)
BASOPHILS NFR BLD AUTO: 0.1 % (ref 0–2)
BILIRUB SERPL-MCNC: 0.4 MG/DL (ref 0.2–1.8)
BUN BLD-MCNC: 12 MG/DL (ref 7–21)
BUN/CREAT SERPL: 12.6 (ref 7–25)
CALCIUM SPEC-SCNC: 9.3 MG/DL (ref 7.7–10)
CHLORIDE SERPL-SCNC: 105 MMOL/L (ref 99–112)
CO2 SERPL-SCNC: 30.5 MMOL/L (ref 24.3–31.9)
CREAT BLD-MCNC: 0.95 MG/DL (ref 0.43–1.29)
DEPRECATED RDW RBC AUTO: 42.6 FL (ref 37–54)
EOSINOPHIL # BLD AUTO: 0.11 10*3/MM3 (ref 0–0.7)
EOSINOPHIL NFR BLD AUTO: 1.1 % (ref 0–5)
ERYTHROCYTE [DISTWIDTH] IN BLOOD BY AUTOMATED COUNT: 13 % (ref 11.5–14.5)
GFR SERPL CREATININE-BSD FRML MDRD: 86 ML/MIN/1.73
GLOBULIN UR ELPH-MCNC: 3.7 GM/DL
GLUCOSE BLD-MCNC: 70 MG/DL (ref 70–110)
HCT VFR BLD AUTO: 45.2 % (ref 42–52)
HGB BLD-MCNC: 16 G/DL (ref 14–18)
IMM GRANULOCYTES # BLD: 0.01 10*3/MM3 (ref 0–0.03)
IMM GRANULOCYTES NFR BLD: 0.1 % (ref 0–0.5)
LYMPHOCYTES # BLD AUTO: 2.84 10*3/MM3 (ref 1–3)
LYMPHOCYTES NFR BLD AUTO: 29.6 % (ref 21–51)
MCH RBC QN AUTO: 32.5 PG (ref 27–33)
MCHC RBC AUTO-ENTMCNC: 35.4 G/DL (ref 33–37)
MCV RBC AUTO: 91.9 FL (ref 80–94)
MONOCYTES # BLD AUTO: 1.32 10*3/MM3 (ref 0.1–0.9)
MONOCYTES NFR BLD AUTO: 13.8 % (ref 0–10)
NEUTROPHILS # BLD AUTO: 5.3 10*3/MM3 (ref 1.4–6.5)
NEUTROPHILS NFR BLD AUTO: 55.3 % (ref 30–70)
OSMOLALITY SERPL CALC.SUM OF ELEC: 279.4 MOSM/KG (ref 273–305)
PLATELET # BLD AUTO: 198 10*3/MM3 (ref 130–400)
PMV BLD AUTO: 9.8 FL (ref 6–10)
POTASSIUM BLD-SCNC: 3.9 MMOL/L (ref 3.5–5.3)
PROT SERPL-MCNC: 8.1 G/DL (ref 6–8)
RBC # BLD AUTO: 4.92 10*6/MM3 (ref 4.7–6.1)
SODIUM BLD-SCNC: 141 MMOL/L (ref 135–153)
WBC NRBC COR # BLD: 9.59 10*3/MM3 (ref 4.5–12.5)

## 2017-03-29 PROCEDURE — 93010 ELECTROCARDIOGRAM REPORT: CPT | Performed by: INTERNAL MEDICINE

## 2017-03-29 PROCEDURE — 97163 PT EVAL HIGH COMPLEX 45 MIN: CPT | Performed by: PHYSICAL THERAPIST

## 2017-03-29 PROCEDURE — 82550 ASSAY OF CK (CPK): CPT | Performed by: EMERGENCY MEDICINE

## 2017-03-29 PROCEDURE — 71010 HC CHEST PA OR AP: CPT

## 2017-03-29 PROCEDURE — 93005 ELECTROCARDIOGRAM TRACING: CPT

## 2017-03-29 PROCEDURE — 84484 ASSAY OF TROPONIN QUANT: CPT | Performed by: EMERGENCY MEDICINE

## 2017-03-29 PROCEDURE — 80053 COMPREHEN METABOLIC PANEL: CPT | Performed by: EMERGENCY MEDICINE

## 2017-03-29 PROCEDURE — 99283 EMERGENCY DEPT VISIT LOW MDM: CPT

## 2017-03-29 PROCEDURE — 85025 COMPLETE CBC W/AUTO DIFF WBC: CPT | Performed by: EMERGENCY MEDICINE

## 2017-03-29 PROCEDURE — 82553 CREATINE MB FRACTION: CPT | Performed by: EMERGENCY MEDICINE

## 2017-03-29 PROCEDURE — 71010 XR CHEST 1 VW: CPT | Performed by: RADIOLOGY

## 2017-03-29 RX ORDER — METOCLOPRAMIDE 10 MG/1
TABLET ORAL
Qty: 60 TABLET | Refills: 5 | Status: SHIPPED | OUTPATIENT
Start: 2017-03-29 | End: 2017-08-21 | Stop reason: SDUPTHER

## 2017-03-29 RX ORDER — PHENYTOIN SODIUM 100 MG/1
CAPSULE, EXTENDED RELEASE ORAL
Qty: 150 CAPSULE | Refills: 5 | Status: SHIPPED | OUTPATIENT
Start: 2017-03-29 | End: 2017-08-21 | Stop reason: SDUPTHER

## 2017-03-29 NOTE — PROGRESS NOTES
"    Outpatient Physical Therapy Ortho Initial Evaluation   [facility]     Patient Name: Jaquan Mckay  : 1970  MRN: [id number]  Today's Date: 3/29/2017      Visit Date: 2017    Patient Active Problem List   Diagnosis   • GERD (gastroesophageal reflux disease)   • Arthritis   • Hypertension   • Seizure disorder   • Hyperlipidemia   • Anxiety   • Varicocele   • Varicocele present on ultrasound of scrotum   • Obesity (BMI 30.0-34.9)        Past Medical History:   Diagnosis Date   • Allergic    • Anxiety    • Arthritis    • Asthma    • Clotting disorder     had a knee surgery   • Depression    • Gastric ulcer    • GERD (gastroesophageal reflux disease)    • H/O migraine    • H/O sleep apnea    • Headache    • Heart attack    • History of epilepsy    • History of seizures    • Hyperlipidemia    • Hypertension    • Knee pain, acute     Left   • Low back pain    • Migraine    • Obesity    • Carl Mountain spotted fever    • Seizures         Past Surgical History:   Procedure Laterality Date   • BACK SURGERY     • BRAIN SURGERY      Tumor removal    • CHOLECYSTECTOMY     • KNEE SURGERY     • TUMOR EXCISION      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 Acute bilateral low back pain with left-sided sciatica M54.42 724.2     724.3             Patient History       17 0800          History    Chief Complaint Pain  -AD      Type of Pain Back pain;Hip pain;Lower Extremity / Leg  -AD      Date Current Problem(s) Began --   2017  -AD      Brief Description of Current Complaint Pt reported getting out of bed and taking four steps toward the bathroom when it \"felt like a shotgun hit me\". He stated he hit the floor and has been having trouble ever since. He reports difficulty with prolonged sitting, standing, and sleeping. He states he is only able to get 2-3 hours of sleep per night and also has difficulties performing ADLs.   -AD      Onset Date- PT " "3/29/2017  -AD      Patient/Caregiver Goals Relieve pain;Return to prior level of function;Improve mobility;Improve strength  -AD      Current Tobacco Use yes  -AD      Smoking Status 1.5 packs/day  -AD      Patient's Rating of General Health Fair  -AD      Hand Dominance right-handed  -AD      Occupation/sports/leisure activities walking; exercise  -AD      How has patient tried to help current problem? Heat, ice, biofreeze  -AD      What clinical tests have you had for this problem? --   X-ray  -AD      Results of Clinical Tests \"Slipped disc and two bulging discs\"  -AD      Pain     Pain Location Back;Leg  -AD      Pain at Present 8  -AD      Pain at Best 8  -AD      Pain at Worst 10  -AD      Pain Frequency Constant/continuous  -AD      Pain Description Stabbing;Shooting  -AD      What Performance Factors Make the Current Problem(s) WORSE? Prolonged sitting, standing, walking, lying flat, ADLs  -AD      What Performance Factors Make the Current Problem(s) BETTER? No relief  -AD      Is your sleep disturbed? Yes  -AD      Is medication used to assist with sleep? No  -AD      Difficulties with ADL's? y  -AD      Difficulties with recreational activities? y  -AD      Fall Risk Assessment    Any falls in the past year: Yes  -AD      Number of falls reported in the last 12 months 2  -AD      Factors that contributed to the fall: Tripped;Lost balance  -AD      Daily Activities    Primary Language English  -AD      How does patient learn best? Demonstration;Listening;Reading  -AD      Teaching needs identified Home Exercise Program  -AD      Pt Participated in POC and Goals Yes  -AD      Safety    Are you being hurt, hit, or frightened by anyone at home or in your life? No  -AD      Are you being neglected by a caregiver No  -AD        User Key  (r) = Recorded By, (t) = Taken By, (c) = Cosigned By    Initials Name Provider Type    AD Ashley Claudene Dalton, PT Physical Therapist                PT Ortho       03/29/17 " 0800    Subjective Pain    Able to rate subjective pain? yes  -AD    Pre-Treatment Pain Level 8  -AD    Post-Treatment Pain Level 10  -AD    Posture/Observations    Posture/Observations Comments Flexed forward posture  -AD    Sensation    Sensation WNL? --   Decreased L2 bilaterally, per pt report  -AD    DTR- Lower Quarter Clearing    Patellar tendon (L2-4) Bilateral:;2- Normal response  -AD    Achilles tendon (S1-2) Bilateral:;2- Normal response  -AD    Sensory Screen for Light Touch- Lower Quarter Clearing    L1 (inguinal area) Bilateral:;Intact  -AD    L2 (anterior mid thigh) Bilateral:;Diminished   Per pt report  -AD    L3 (distal anterior thigh) Bilateral:;Intact  -AD    L4 (medial lower leg/foot) Bilateral:;Intact  -AD    L5 (lateral lower leg/great toe) Bilateral:;Intact  -AD    S1 (bottom of foot) Bilateral:;Intact  -AD    Myotomal Screen- Lower Quarter Clearing    Hip flexion (L2) Bilateral:;4+ (Good +)  -AD    Knee extension (L3) Right:;4- (Good -);Left:;4 (Good)  -AD    Ankle DF (L4) Bilateral:;4+ (Good +)  -AD    Knee flexion (S2) Bilateral:;4- (Good -)  -AD    Lumbar ROM Screen- Lower Quarter Clearing    Lumbar Flexion Impaired   25%  -AD    Lumbar Extension Impaired   25%  -AD    Lumbar Lateral Flexion Impaired   25%  -AD    Lumbar Rotation Impaired   25%  -AD    SI/Hip Screen- Lower Quarter Clearing    ASIS compression Bilateral:;Positive  -AD    ASIS distraction Bilateral:;Positive  -AD    Chantale's/Amos's test Bilateral:;Positive  -AD    Posterior thigh sheer Bilateral:;Positive  -AD    Pain in Susan's area --   Apparent anteriorly rotated left innominate  -AD    Special Tests/Palpation    Special Tests/Palpation --   Tenderness to left L3, paraspinals, pelvis, and left GT  -AD    Lumbar/SI Special Tests    SLR (Neural Tension) Bilateral:;Positive  -AD    Sacral Spring Test (SI Dysfunction) Bilateral:;Positive  -AD    Gait Assessment/Treatment    Gait, Gait Deviations decreased heel  strike;forward flexed posture;narrow base;step length decreased;stride length decreased;weight-shifting ability decreased  -AD      User Key  (r) = Recorded By, (t) = Taken By, (c) = Cosigned By    Initials Name Provider Type    AD Ashley Claudene Dalton, PT Physical Therapist                            Therapy Education       03/29/17 0850          Therapy Education    Given HEP;Symptoms/condition management;Pain management;Posture/body mechanics;Fall prevention and home safety  -AD      Program New  -AD      How Provided Verbal;Written;Demonstration  -AD      Provided to Patient  -AD      Level of Understanding Verbalized;Demonstrated  -AD        User Key  (r) = Recorded By, (t) = Taken By, (c) = Cosigned By    Initials Name Provider Type    AD Ashley Claudene Dalton, PT Physical Therapist                PT OP Goals       03/29/17 0800       PT Short Term Goals    STG Date to Achieve 04/12/17  -AD     STG 1 Pt will be instructed in HEP for improved independence.  -AD     STG 1 Progress New  -AD     STG 2 Pt will report pain no greater than 7/10 with daily activities for improved activity.  -AD     STG 2 Progress New  -AD     STG 3 Pt will demonstrate 50% lumbar ROM in all planes for improved function.  -AD     STG 3 Progress New  -AD     Long Term Goals    LTG Date to Achieve 04/28/17  -AD     LTG 1 Pt will demonstrate 4+/5 BLE strength for improved function.  -AD     LTG 1 Progress New  -AD     LTG 2 Pt will report a maximum low back and LLE pain of 5/10 for improved independence and activity.  -AD     LTG 2 Progress New  -AD     LTG 3 Pt will report less than 20% impairment on the Modified Oswestry for improved functional independence.  -AD     LTG 3 Progress New  -AD     LTG 4 Pt will report centralization of left leg radicular symptoms.  -AD     LTG 4 Progress New  -AD     Time Calculation    PT Goal Re-Cert Due Date 04/28/17  -AD       User Key  (r) = Recorded By, (t) = Taken By, (c) = Cosigned By    Initials  Name Provider Type    AD Ashley Claudene Dalton, PT Physical Therapist                PT Assessment/Plan       03/29/17 0854       PT Assessment    Functional Limitations Decreased safety during functional activities;Impaired gait;Limitations in community activities;Limitation in home management;Performance in self-care ADL;Performance in leisure activities;Limitations in functional capacity and performance  -AD     Impairments Balance;Gait;Range of motion;Sensation;Muscle strength;Pain;Posture;Joint mobility  -AD     Assessment Comments The patient is a 46 year old male presenting to the clinic with low back, pelvis, left hip, and left radicular pain. He demonstrated impaired gait with a narrowed base of support and decreased weight shifting. Sit<>stand and supine<>sit transfers appeared difficult to the patient due to pain. Upon inspection, the right innominate was anteriorly rotated and addressed with muscle energy techniques. Following techniques, the innominate appeared to be in proper alignment. He was tender to palpation of L2-L5, bilateral SI, and the left greater trochanter. He would benefit from skilled physical therapy to address functional limitations and impairments.  -AD     Please refer to paper survey for additional self-reported information Yes  -AD     Rehab Potential Fair  -AD     Patient/caregiver participated in establishment of treatment plan and goals Yes  -AD     Patient would benefit from skilled therapy intervention Yes  -AD     PT Plan    PT Frequency 2x/week;3x/week  -AD     Predicted Duration of Therapy Intervention (days/wks) 4 weeks  -AD     Planned CPT's? PT EVAL HIGH COMPLEXITY: 41040;PT RE-EVAL: 87825;PT THER PROC EA 15 MIN: 13913;PT GAIT TRAINING EA 15 MIN: 82256;PT NEUROMUSC RE-EDUCATION EA 15 MIN: 31295;PT MANUAL THERAPY EA 15 MIN: 31314;PT THER ACT EA 15 MIN: 26291;PT ELECTRICAL STIM UNATTEND: ;PT HOT/COLD PACK WC NONMCARE: 95476;PT ULTRASOUND EA 15 MIN: 95878;PT TRACTION  LUMBAR: 38667;PT THER SUPP EA 15 MIN  -AD     Physical Therapy Interventions (Optional Details) balance training;gait training;gross motor skills;home exercise program;joint mobilization;postural re-education;patient/family education;neuromuscular re-education;motor coordination training;modalities;manual therapy techniques;lumbar stabilization;ROM (Range of Motion);stair training;strengthening;stretching;transfer training  -AD     PT Plan Comments Progress as tolerated per POC. The above noted interventions will be used to address functional limitations and impairments.   -AD       User Key  (r) = Recorded By, (t) = Taken By, (c) = Cosigned By    Initials Name Provider Type    AD Ashley Claudene Dalton, PT Physical Therapist                  Exercises       03/29/17 0800          Subjective Pain    Able to rate subjective pain? yes  -AD      Pre-Treatment Pain Level 8  -AD      Post-Treatment Pain Level 10  -AD      Exercise 1    Exercise Name 1 SKTC, LTR, PPT  -AD      Cueing 1 Tactile;Verbal;Demo   HEP  -AD        User Key  (r) = Recorded By, (t) = Taken By, (c) = Cosigned By    Initials Name Provider Type    AD Ashley Claudene Dalton, PT Physical Therapist                              Outcome Measures       03/29/17 0800          Modified Oswestry    Modified Oswestry Score/Comments 27/50  -AD      Functional Assessment    Outcome Measure Options Modifed Owestry  -AD        User Key  (r) = Recorded By, (t) = Taken By, (c) = Cosigned By    Initials Name Provider Type    AD Ashley Claudene Dalton, PT Physical Therapist            Time Calculation:   Start Time: 0800  Stop Time: 0900  Time Calculation (min): 60 min     Therapy Charges for Today     Code Description Service Date Service Provider Modifiers Qty    24609240969  PT EVAL HIGH COMPLEXITY 4 3/29/2017 Ashley Claudene Dalton, PT GP 1          PT G-Codes  Outcome Measure Options: Modifed Owestry         Ashley Claudene Dalton, PT  3/29/2017

## 2017-03-30 ENCOUNTER — HOSPITAL ENCOUNTER (EMERGENCY)
Facility: HOSPITAL | Age: 47
Discharge: HOME | End: 2017-03-30
Attending: EMERGENCY MEDICINE | Admitting: EMERGENCY MEDICINE

## 2017-03-30 VITALS
BODY MASS INDEX: 33.59 KG/M2 | WEIGHT: 214 LBS | HEIGHT: 67 IN | RESPIRATION RATE: 16 BRPM | HEART RATE: 77 BPM | DIASTOLIC BLOOD PRESSURE: 87 MMHG | OXYGEN SATURATION: 100 % | TEMPERATURE: 98.2 F | SYSTOLIC BLOOD PRESSURE: 136 MMHG

## 2017-03-30 DIAGNOSIS — R07.9 CHEST PAIN, UNSPECIFIED TYPE: Primary | ICD-10-CM

## 2017-03-30 LAB
CK MB SERPL-CCNC: 1.88 NG/ML (ref 0–5)
CK MB SERPL-RTO: 1.6 % (ref 0–3)
CK SERPL-CCNC: 117 U/L (ref 24–204)
HOLD SPECIMEN: NORMAL
HOLD SPECIMEN: NORMAL
TROPONIN I SERPL-MCNC: <0.006 NG/ML
TROPONIN I SERPL-MCNC: <0.006 NG/ML
WHOLE BLOOD HOLD SPECIMEN: NORMAL
WHOLE BLOOD HOLD SPECIMEN: NORMAL

## 2017-03-30 PROCEDURE — 84484 ASSAY OF TROPONIN QUANT: CPT | Performed by: EMERGENCY MEDICINE

## 2017-03-30 RX ORDER — PHENOBARBITAL, HYOSCYAMINE SULFATE, ATROPINE SULFATE AND SCOPOLAMINE HYDROBROMIDE .0194; .1037; 16.2; .0065 MG/1; MG/1; MG/1; MG/1
2 TABLET ORAL ONCE
Status: COMPLETED | OUTPATIENT
Start: 2017-03-30 | End: 2017-03-30

## 2017-03-30 RX ORDER — FAMOTIDINE 20 MG/1
20 TABLET, FILM COATED ORAL NIGHTLY
Qty: 30 TABLET | Refills: 0 | Status: SHIPPED | OUTPATIENT
Start: 2017-03-30 | End: 2017-05-19 | Stop reason: SDUPTHER

## 2017-03-30 RX ORDER — MAGNESIUM HYDROXIDE/ALUMINUM HYDROXICE/SIMETHICONE 120; 1200; 1200 MG/30ML; MG/30ML; MG/30ML
30 SUSPENSION ORAL ONCE
Status: DISCONTINUED | OUTPATIENT
Start: 2017-03-30 | End: 2017-03-30 | Stop reason: CLARIF

## 2017-03-30 RX ORDER — ALUMINA, MAGNESIA, AND SIMETHICONE 2400; 2400; 240 MG/30ML; MG/30ML; MG/30ML
15 SUSPENSION ORAL ONCE
Status: COMPLETED | OUTPATIENT
Start: 2017-03-30 | End: 2017-03-30

## 2017-03-30 RX ADMIN — PHENOBARBITAL, HYOSCYAMINE SULFATE, ATROPINE SULFATE, SCOPOLAMINE HYDROBROMIDE 32.4 MG: 16.2; .1037; .0194; .0065 TABLET ORAL at 02:50

## 2017-03-30 RX ADMIN — LIDOCAINE HYDROCHLORIDE 15 ML: 20 SOLUTION ORAL; TOPICAL at 02:50

## 2017-03-30 RX ADMIN — ALUMINUM HYDROXIDE, MAGNESIUM HYDROXIDE, AND DIMETHICONE 15 ML: 400; 400; 40 SUSPENSION ORAL at 02:50

## 2017-04-05 ENCOUNTER — HOSPITAL ENCOUNTER (OUTPATIENT)
Dept: PHYSICAL THERAPY | Facility: HOSPITAL | Age: 47
Setting detail: THERAPIES SERIES
Discharge: HOME | End: 2017-04-05

## 2017-04-05 DIAGNOSIS — M54.42 ACUTE BILATERAL LOW BACK PAIN WITH LEFT-SIDED SCIATICA: Primary | ICD-10-CM

## 2017-04-05 PROCEDURE — 97110 THERAPEUTIC EXERCISES: CPT

## 2017-04-05 PROCEDURE — G0283 ELEC STIM OTHER THAN WOUND: HCPCS

## 2017-04-05 NOTE — PROGRESS NOTES
Outpatient Physical Therapy Ortho Treatment Note  [facility]     Patient Name: Jaquan Mckay  : 1970  MRN: [id number]  Today's Date: 2017      Visit Date: 2017    Visit Dx:    ICD-10-CM ICD-9-CM   1. Acute bilateral low back pain with left-sided sciatica M54.42 724.2     724.3       Patient Active Problem List   Diagnosis   • GERD (gastroesophageal reflux disease)   • Arthritis   • Hypertension   • Seizure disorder   • Hyperlipidemia   • Anxiety   • Varicocele   • Varicocele present on ultrasound of scrotum   • Obesity (BMI 30.0-34.9)        Past Medical History:   Diagnosis Date   • Allergic    • Anxiety    • Arthritis    • Asthma    • Clotting disorder     had a knee surgery   • Depression    • Gastric ulcer    • GERD (gastroesophageal reflux disease)    • H/O migraine    • H/O sleep apnea    • Headache    • Heart attack    • History of epilepsy    • History of seizures    • Hyperlipidemia    • Hypertension    • Knee pain, acute     Left   • Low back pain    • Migraine    • Obesity    • Carl Mountain spotted fever    • Seizures         Past Surgical History:   Procedure Laterality Date   • BACK SURGERY     • BRAIN SURGERY      Tumor removal    • CHOLECYSTECTOMY     • KNEE SURGERY     • TUMOR EXCISION      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 Patient reports that he just came from his MD appt and he is really sore. Patient reports wearing his back brace when he is standing and sitting  -AH    Subjective Pain    Able to rate subjective pain? yes  -AH    Pre-Treatment Pain Level 8  -AH    Post-Treatment Pain Level 8  -AH      User Key  (r) = Recorded By, (t) = Taken By, (c) = Cosigned By    Initials Name Provider Type    GAUDENCIO Morton, PTA Physical Therapy Assistant                            PT Assessment/Plan       17  1252       PT Assessment    Assessment Comments Patient tolerated treatment fair with mild increase in pain noted following treatment. New ther ex added per the patient's tolerance, patient demonstrated and understood new ther ex with mild soreness noted. Patient was unable to perform several exercises in supine due to increased pain. Educated patient to perform ther ex per his tolerance, patient verbalized understanding. No adverse reactions with modalities or treatment.   -     PT Plan    PT Plan Comments Continue per PT's POC, progress per the patient's tolerance.  -       User Key  (r) = Recorded By, (t) = Taken By, (c) = Cosigned By    Initials Name Provider Type     Radha Morton PTA Physical Therapy Assistant                Modalities       04/05/17 1200          Moist Heat    MH Applied Yes   No redness noted following moist heat  -      Location low back  -      Rx Minutes 15 mins  -      MH Prior to Rx Yes  -      ELECTRICAL STIMULATION    Attended/Unattended Unattended   No irritation noted following estim  -      Stimulation Type IFC  -      Max mAmp --   per the patient's tolerance  -      Location/Electrode Placement/Other low back  -      Rx Minutes 15 mins  -        User Key  (r) = Recorded By, (t) = Taken By, (c) = Cosigned By    Initials Name Provider Type     Radha Morton PTA Physical Therapy Assistant                Exercises       04/05/17 1200          Subjective Comments    Subjective Comments Patient states that he is having increased low back pain. Patient reports that he just came from his MD appt and he is really sore. Patient reports wearing his back brace when he is standing and sitting  -      Subjective Pain    Able to rate subjective pain? yes  -      Pre-Treatment Pain Level 8  -      Post-Treatment Pain Level 8  -      Exercise 1    Exercise Name 1 SKTC on R LE 20 sec hold x3, LTR 10x2, knee flex with YTB 15x, LAQ 10x each, hip abd  with YTB 15x, ball squeeze 15x, seated march 10x  -      Cueing 1 Tactile;Verbal;Demo  -AH      Time (Minutes) 1 20 minutes  -      Treatment Type 1 Ther Ex  -        User Key  (r) = Recorded By, (t) = Taken By, (c) = Cosigned By    Initials Name Provider Type    GAUDENCIO Morton PTA Physical Therapy Assistant                                   Therapy Education       04/05/17 1251          Therapy Education    Given HEP;Symptoms/condition management;Pain management;Posture/body mechanics;Fall prevention and home safety  -      Program New  -      How Provided Verbal  -      Provided to Patient  -      Level of Understanding Verbalized;Demonstrated  -        User Key  (r) = Recorded By, (t) = Taken By, (c) = Cosigned By    Initials Name Provider Type    GAUDENCIO Morton PTA Physical Therapy Assistant                Time Calculation:   Start Time: 1040  Stop Time: 1140  Time Calculation (min): 60 min    Therapy Charges for Today     Code Description Service Date Service Provider Modifiers Qty    83113834274 HC PT THER PROC EA 15 MIN 4/5/2017 Radha Morton PTA GP 1    98667784012 HC PT ELECTRICAL STIM UNATTENDED 4/5/2017 Radha Morton PTA  1    45153606754 HC PT THER SUPP EA 15 MIN 4/5/2017 Radha Morton PTA GP 1                    Radha Morton PTA  4/5/2017

## 2017-04-07 ENCOUNTER — HOSPITAL ENCOUNTER (OUTPATIENT)
Dept: PHYSICAL THERAPY | Facility: HOSPITAL | Age: 47
Setting detail: THERAPIES SERIES
Discharge: HOME | End: 2017-04-07

## 2017-04-07 DIAGNOSIS — M54.42 ACUTE BILATERAL LOW BACK PAIN WITH LEFT-SIDED SCIATICA: Primary | ICD-10-CM

## 2017-04-07 PROCEDURE — 97110 THERAPEUTIC EXERCISES: CPT

## 2017-04-07 PROCEDURE — 97035 APP MDLTY 1+ULTRASOUND EA 15: CPT

## 2017-04-07 PROCEDURE — G0283 ELEC STIM OTHER THAN WOUND: HCPCS

## 2017-04-07 NOTE — PROGRESS NOTES
Outpatient Physical Therapy Ortho Treatment Note  [facility]     Patient Name: Jaquan Mckay  : 1970  MRN: [id number]  Today's Date: 2017      Visit Date: 2017    Visit Dx:    ICD-10-CM ICD-9-CM   1. Acute bilateral low back pain with left-sided sciatica M54.42 724.2     724.3       Patient Active Problem List   Diagnosis   • GERD (gastroesophageal reflux disease)   • Arthritis   • Hypertension   • Seizure disorder   • Hyperlipidemia   • Anxiety   • Varicocele   • Varicocele present on ultrasound of scrotum   • Obesity (BMI 30.0-34.9)        Past Medical History:   Diagnosis Date   • Allergic    • Anxiety    • Arthritis    • Asthma    • Clotting disorder 2004    had a knee surgery   • Depression    • Gastric ulcer    • GERD (gastroesophageal reflux disease)    • H/O migraine    • H/O sleep apnea    • Headache    • Heart attack    • History of epilepsy    • History of seizures    • Hyperlipidemia    • Hypertension    • Knee pain, acute     Left   • Low back pain    • Migraine    • Obesity    • Carl Mountain spotted fever    • Seizures         Past Surgical History:   Procedure Laterality Date   • BACK SURGERY     • BRAIN SURGERY  1986    Tumor removal    • CHOLECYSTECTOMY     • KNEE SURGERY     • TUMOR EXCISION      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 Patient reports of being unable to sleep last night due to pain. Patient states that he has been working on home exercises and has been walking. Patient states that he goes back to his back doctor next month.   -AH    Subjective Pain    Able to rate subjective pain? yes  -    Pre-Treatment Pain Level 9  -    Post-Treatment Pain Level 8  -AH      17 1200    Subjective Comments    Subjective Comments Patient states that he is having increased low back pain. Patient  reports that he just came from his MD appt and he is really sore. Patient reports wearing his back brace when he is standing and sitting  -    Subjective Pain    Able to rate subjective pain? yes  -    Pre-Treatment Pain Level 8  -AH    Post-Treatment Pain Level 8  -      User Key  (r) = Recorded By, (t) = Taken By, (c) = Cosigned By    Initials Name Provider Type     Radha Morton PTA Physical Therapy Assistant                            PT Assessment/Plan       04/07/17 1227       PT Assessment    Assessment Comments New ther ex added per the patient's tolerance, patient demonstrated and understood new ther ex with no increase in pain noted. Patient performed ther ex slow due to increased pain. Educated patient to perform ther ex per his tolerance, patient verbalized understanding. Reps increased on several exercises per the patient's tolerance. Ultrasound performed to L low back musculature to help with pain and inflammation in the low back. Decrease in pain noted following treatment. No adverse reactions with modalities or treatment.   -     PT Plan    PT Plan Comments Continue per PT's POC, progress per the patient's tolerance  -       User Key  (r) = Recorded By, (t) = Taken By, (c) = Cosigned By    Initials Name Provider Type     Radha Morton PTA Physical Therapy Assistant                Modalities       04/07/17 1200          Moist Heat    MH Applied Yes   No redness noted following moist heat  -      Location low back  -      Rx Minutes 10 mins  -      MH Prior to Rx Yes  -      ELECTRICAL STIMULATION    Attended/Unattended Unattended   No irritation noted following estim  -      Stimulation Type IFC  -      Max mAmp --   per the patient's tolerance  -      Location/Electrode Placement/Other low back  -      Rx Minutes 10 mins  -      Ultrasound 86125    Location L low back musculature  -      Rx Minutes 8 minutes  -      Duty Cycle 50  -AH      Frequency  1.0 MHz  -      Intensity - Wts/cm 1.2  -        User Key  (r) = Recorded By, (t) = Taken By, (c) = Cosigned By    Initials Name Provider Type     Radha Morton PTA Physical Therapy Assistant                Exercises       04/07/17 1200          Subjective Comments    Subjective Comments Patient reports that he is having really bad pain in the left side of his back. Patient reports of being unable to sleep last night due to pain. Patient states that he has been working on home exercises and has been walking. Patient states that he goes back to his back doctor next month.   -      Subjective Pain    Able to rate subjective pain? yes  -      Pre-Treatment Pain Level 9  -      Post-Treatment Pain Level 8  -      Exercise 1    Exercise Name 1 LTR 10x2, SAQ x10, knee flex with YTB 15x, LAQ 15x each, hip abd with YTB 10x2, ball squeeze 10x2, seated march x10  -      Cueing 1 Tactile;Verbal;Demo  -      Time (Minutes) 1 25 minutes  -      Treatment Type 1 Ther Ex  -        User Key  (r) = Recorded By, (t) = Taken By, (c) = Cosigned By    Initials Name Provider Type     Radha Morton PTA Physical Therapy Assistant                                   Therapy Education       04/07/17 1227          Therapy Education    Given HEP;Symptoms/condition management;Pain management;Posture/body mechanics  -      Program New  -      How Provided Verbal  -      Provided to Patient  -      Level of Understanding Verbalized;Demonstrated  -        User Key  (r) = Recorded By, (t) = Taken By, (c) = Cosigned By    Initials Name Provider Type     Radha Morton PTA Physical Therapy Assistant                Time Calculation:   Start Time: 1100  Stop Time: 1205  Time Calculation (min): 65 min    Therapy Charges for Today     Code Description Service Date Service Provider Modifiers Qty    43839123756  PT THER PROC EA 15 MIN 4/7/2017 Radha Morton PTA GP 2    [id number]  PT  ELECTRICAL STIM UNATTENDED 4/7/2017 Radha Morton, PTA  1    96236600636 HC PT ULTRASOUND EA 15 MIN 4/7/2017 Radha Morton PTA GP 1                    Radha Morton, NIEL  4/7/2017

## 2017-04-12 ENCOUNTER — HOSPITAL ENCOUNTER (OUTPATIENT)
Dept: PHYSICAL THERAPY | Facility: HOSPITAL | Age: 47
Setting detail: THERAPIES SERIES
Discharge: HOME | End: 2017-04-12

## 2017-04-12 DIAGNOSIS — M54.42 ACUTE BILATERAL LOW BACK PAIN WITH LEFT-SIDED SCIATICA: Primary | ICD-10-CM

## 2017-04-12 PROCEDURE — 97110 THERAPEUTIC EXERCISES: CPT

## 2017-04-12 PROCEDURE — G0283 ELEC STIM OTHER THAN WOUND: HCPCS

## 2017-04-12 PROCEDURE — 97035 APP MDLTY 1+ULTRASOUND EA 15: CPT

## 2017-04-12 NOTE — PROGRESS NOTES
Outpatient Physical Therapy Ortho Treatment Note  [facility]     Patient Name: Jaquan Mckay  : 1970  MRN: [id number]  Today's Date: 2017      Visit Date: 2017    Visit Dx:    ICD-10-CM ICD-9-CM   1. Acute bilateral low back pain with left-sided sciatica M54.42 724.2     724.3       Patient Active Problem List   Diagnosis   • GERD (gastroesophageal reflux disease)   • Arthritis   • Hypertension   • Seizure disorder   • Hyperlipidemia   • Anxiety   • Varicocele   • Varicocele present on ultrasound of scrotum   • Obesity (BMI 30.0-34.9)        Past Medical History:   Diagnosis Date   • Allergic    • Anxiety    • Arthritis    • Asthma    • Clotting disorder     had a knee surgery   • Depression    • Gastric ulcer    • GERD (gastroesophageal reflux disease)    • H/O migraine    • H/O sleep apnea    • Headache    • Heart attack    • History of epilepsy    • History of seizures    • Hyperlipidemia    • Hypertension    • Knee pain, acute     Left   • Low back pain    • Migraine    • Obesity    • Carl Mountain spotted fever    • Seizures         Past Surgical History:   Procedure Laterality Date   • BACK SURGERY     • BRAIN SURGERY      Tumor removal    • CHOLECYSTECTOMY     • KNEE SURGERY     • TUMOR EXCISION      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  Pt cont to demonstrate poor response to treatment with pain 8/10 post tx.  Pt unable to be progressed with exercises due to reports of pain today. Pt required cues for exercises today.  -RT     PT Plan    PT  Plan Comments Will follow progressing spinal stability.  -RT       User Key  (r) = Recorded By, (t) = Taken By, (c) = Cosigned By    Initials Name Provider Type    RT Win Rodriguez PT Physical Therapist                Modalities       04/12/17 1100          Subjective Comments    Subjective Comments Patient reports having increased back pain today.  -RT      Subjective Pain    Able to rate subjective pain? yes  -RT      Pre-Treatment Pain Level 9  -RT      Moist Heat    MH Applied Yes  -RT      Location low back  -RT      Rx Minutes 10 mins  -RT      MH Prior to Rx Yes  -RT      ELECTRICAL STIMULATION    Attended/Unattended Unattended  -RT      Stimulation Type IFC  -RT      Location/Electrode Placement/Other low back  -RT      Rx Minutes 10 mins  -RT      Ultrasound [id number]    Location L low back musculature  -RT      Rx Minutes 8 minutes  -RT      Duty Cycle 50  -RT      Frequency 1.0 MHz  -RT      Intensity - Wts/cm 1.2  -RT        User Key  (r) = Recorded By, (t) = Taken By, (c) = Cosigned By    Initials Name Provider Type    RT Win Rodriguez PT Physical Therapist                Exercises       04/12/17 1100          Subjective Comments    Subjective Comments Patient reports having increased back pain today.  -RT      Subjective Pain    Able to rate subjective pain? yes  -RT      Pre-Treatment Pain Level 9  -RT      Post-Treatment Pain Level 8  -RT      Exercise 1    Exercise Name 1 ltr x20, knee flexion and hip abd x20 with ytb, saq x15, ball squeeze x25, seated march x20, laq x20,   -RT      Cueing 1 Tactile;Verbal;Demo  -RT      Time (Minutes) 1 25 minutes  -RT      Treatment Type 1 Ther Ex  -RT        User Key  (r) = Recorded By, (t) = Taken By, (c) = Cosigned By    Initials Name Provider Type    RT Win Rodriguez PT Physical Therapist                                   Therapy Education       04/12/17 1192          Therapy Education    Given HEP;Symptoms/condition management;Pain  management;Posture/body mechanics  -RT      Program Reinforced  -RT      How Provided Verbal;Demonstration  -RT      Provided to Patient  -RT      Level of Understanding Teach back education performed;Verbalized  -RT        User Key  (r) = Recorded By, (t) = Taken By, (c) = Cosigned By    Initials Name Provider Type    RT Win Rodriguez, PT Physical Therapist                Time Calculation:   Start Time: 1100  Stop Time: 1200  Time Calculation (min): 60 min    Therapy Charges for Today     Code Description Service Date Service Provider Modifiers Qty    24304797799 HC PT THER PROC EA 15 MIN 4/12/2017 Win Rodriguez, PT GP 2    45847841362 HC PT ELECTRICAL STIM UNATTENDED 4/12/2017 Win Rodriguez, PT  1                    Win Rodriguez, PT  4/12/2017

## 2017-04-14 ENCOUNTER — HOSPITAL ENCOUNTER (OUTPATIENT)
Dept: PHYSICAL THERAPY | Facility: HOSPITAL | Age: 47
Setting detail: THERAPIES SERIES
Discharge: HOME | End: 2017-04-14

## 2017-04-14 DIAGNOSIS — M54.42 ACUTE BILATERAL LOW BACK PAIN WITH LEFT-SIDED SCIATICA: Primary | ICD-10-CM

## 2017-04-14 PROCEDURE — G0283 ELEC STIM OTHER THAN WOUND: HCPCS

## 2017-04-14 PROCEDURE — 97110 THERAPEUTIC EXERCISES: CPT

## 2017-04-14 NOTE — PROGRESS NOTES
Outpatient Physical Therapy Ortho Treatment Note  [facility]     Patient Name: Jaquan Mckay  : 1970  MRN: [id number]  Today's Date: 2017      Visit Date: 2017    Visit Dx:    ICD-10-CM ICD-9-CM   1. Acute bilateral low back pain with left-sided sciatica M54.42 724.2     724.3       Patient Active Problem List   Diagnosis   • GERD (gastroesophageal reflux disease)   • Arthritis   • Hypertension   • Seizure disorder   • Hyperlipidemia   • Anxiety   • Varicocele   • Varicocele present on ultrasound of scrotum   • Obesity (BMI 30.0-34.9)        Past Medical History:   Diagnosis Date   • Allergic    • Anxiety    • Arthritis    • Asthma    • Clotting disorder     had a knee surgery   • Depression    • Gastric ulcer    • GERD (gastroesophageal reflux disease)    • H/O migraine    • H/O sleep apnea    • Headache    • Heart attack    • History of epilepsy    • History of seizures    • Hyperlipidemia    • Hypertension    • Knee pain, acute     Left   • Low back pain    • Migraine    • Obesity    • Carl Mountain spotted fever    • Seizures         Past Surgical History:   Procedure Laterality Date   • BACK SURGERY     • BRAIN SURGERY      Tumor removal    • CHOLECYSTECTOMY     • KNEE SURGERY     • TUMOR EXCISION      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 Patient reports that he is unable sleep due to pain in his back.   -AH    Subjective Pain    Able to rate subjective pain? yes  -    Pre-Treatment Pain Level 9  -    Post-Treatment Pain Level 8  -AH      17 1100    Subjective Pain    Post-Treatment Pain Level 8  -RT      User Key  (r) = Recorded By, (t) = Taken By, (c) = Cosigned By    Initials Name Provider Type    GAUDENCIO Morton PTA Physical Therapy Assistant    RT Win Rodriguez PT Physical Therapist                             PT Assessment/Plan       04/14/17 1153       PT Assessment    Assessment Comments Patient tolerated treatment fair with no increase in pain or facial grimaces. Educated patient to perform ther ex per his tolerance, patient verbalized understanding. Unable to progress exercises due to patient's increased pain. Verbal cues needed on proper technique of exercises. No adverse reactions with modalities or treatment. Decrease in pain noted following treatment.   -     PT Plan    PT Plan Comments Continue per PT's POC, progress per the patient's tolerance  -       User Key  (r) = Recorded By, (t) = Taken By, (c) = Cosigned By    Initials Name Provider Type     Radha Morton PTA Physical Therapy Assistant                Modalities       04/14/17 1100          Moist Heat    MH Applied Yes   No redness noted following moist heat  -      Location low back   Leighann Lancaster PTA assisted with beginning of treatment.   -      Rx Minutes 10 mins  -      MH Prior to Rx Yes  -      ELECTRICAL STIMULATION    Attended/Unattended Unattended   No irritation noted following estim  -      Stimulation Type IFC  -      Max mAmp --   per the patient's tolerance  -      Location/Electrode Placement/Other low back  -      Rx Minutes 10 mins  -      Ultrasound 33222    Location B low back musculature  -      Rx Minutes 8 minutes  -      Duty Cycle 100  -      Frequency 1.0 MHz  -      Intensity - Wts/cm 1.2  -        User Key  (r) = Recorded By, (t) = Taken By, (c) = Cosigned By    Initials Name Provider Type     Radha Morton PTA Physical Therapy Assistant                Exercises       04/14/17 1100          Subjective Comments    Subjective Comments Patient states that his back has been bothering him. Patient reports that he is unable sleep due to pain in his back.   -      Subjective Pain    Able to rate subjective pain? yes  -      Pre-Treatment Pain Level 9  -AH      Post-Treatment  Pain Level 8  -AH      Exercise 1    Exercise Name 1 LTR 10x2, SAQ x15, knee flex with YTB 10x2, hip abd with YTB 10x2, ball squeeze 10x2, seated march 10x2, LAQ 10x2  -      Cueing 1 Tactile;Verbal;Demo  -AH      Time (Minutes) 1 25 minutes  -      Treatment Type 1 Ther Ex  -AH        User Key  (r) = Recorded By, (t) = Taken By, (c) = Cosigned By    Initials Name Provider Type     Radha Morton PTA Physical Therapy Assistant                                   Therapy Education       04/14/17 1153          Therapy Education    Given HEP;Symptoms/condition management;Pain management;Posture/body mechanics  -      Program Reinforced  -      How Provided Verbal  -AH      Provided to Patient  -      Level of Understanding Verbalized;Demonstrated  -        User Key  (r) = Recorded By, (t) = Taken By, (c) = Cosigned By    Initials Name Provider Type     Radha Morton PTA Physical Therapy Assistant                Time Calculation:   Start Time: 1045  Stop Time: 1135  Time Calculation (min): 50 min    Therapy Charges for Today     Code Description Service Date Service Provider Modifiers Qty    80830945053 HC PT THER PROC EA 15 MIN 4/14/2017 Radha Morton PTA GP 2    28563257324 HC PT ELECTRICAL STIM UNATTENDED 4/14/2017 Radha Morton PTA  1    66982692328 HC PT THER SUPP EA 15 MIN 4/14/2017 Radha Morton PTA GP 1                    Radha Morton PTA  4/14/2017

## 2017-04-19 ENCOUNTER — HOSPITAL ENCOUNTER (OUTPATIENT)
Dept: PHYSICAL THERAPY | Facility: HOSPITAL | Age: 47
Setting detail: THERAPIES SERIES
Discharge: HOME | End: 2017-04-19

## 2017-04-19 ENCOUNTER — OFFICE VISIT (OUTPATIENT)
Dept: FAMILY MEDICINE CLINIC | Facility: CLINIC | Age: 47
End: 2017-04-19

## 2017-04-19 VITALS
WEIGHT: 220 LBS | HEART RATE: 68 BPM | HEIGHT: 67 IN | TEMPERATURE: 98.4 F | BODY MASS INDEX: 34.53 KG/M2 | OXYGEN SATURATION: 98 % | DIASTOLIC BLOOD PRESSURE: 84 MMHG | SYSTOLIC BLOOD PRESSURE: 124 MMHG

## 2017-04-19 DIAGNOSIS — G89.29 CHRONIC BILATERAL LOW BACK PAIN WITH LEFT-SIDED SCIATICA: Primary | ICD-10-CM

## 2017-04-19 DIAGNOSIS — I10 ESSENTIAL HYPERTENSION: ICD-10-CM

## 2017-04-19 DIAGNOSIS — M54.42 ACUTE BILATERAL LOW BACK PAIN WITH LEFT-SIDED SCIATICA: Primary | ICD-10-CM

## 2017-04-19 DIAGNOSIS — M54.42 CHRONIC BILATERAL LOW BACK PAIN WITH LEFT-SIDED SCIATICA: Primary | ICD-10-CM

## 2017-04-19 DIAGNOSIS — J30.1 SEASONAL ALLERGIC RHINITIS DUE TO POLLEN: ICD-10-CM

## 2017-04-19 DIAGNOSIS — J45.20 MILD INTERMITTENT ASTHMA WITHOUT COMPLICATION: ICD-10-CM

## 2017-04-19 PROCEDURE — 99214 OFFICE O/P EST MOD 30 MIN: CPT | Performed by: NURSE PRACTITIONER

## 2017-04-19 PROCEDURE — G0283 ELEC STIM OTHER THAN WOUND: HCPCS | Performed by: PHYSICAL THERAPIST

## 2017-04-19 PROCEDURE — 97110 THERAPEUTIC EXERCISES: CPT | Performed by: PHYSICAL THERAPIST

## 2017-04-19 RX ORDER — HYDROCODONE BITARTRATE AND ACETAMINOPHEN 5; 325 MG/1; MG/1
1 TABLET ORAL 2 TIMES DAILY PRN
Qty: 60 TABLET | Refills: 0 | Status: SHIPPED | OUTPATIENT
Start: 2017-04-19 | End: 2017-04-19 | Stop reason: DRUGHIGH

## 2017-04-19 RX ORDER — LISINOPRIL 20 MG/1
20 TABLET ORAL DAILY
Qty: 30 TABLET | Refills: 5 | Status: SHIPPED | OUTPATIENT
Start: 2017-04-19 | End: 2017-05-30 | Stop reason: SDUPTHER

## 2017-04-19 RX ORDER — METOPROLOL SUCCINATE 25 MG/1
25 TABLET, EXTENDED RELEASE ORAL DAILY
Qty: 30 TABLET | Refills: 5 | Status: SHIPPED | OUTPATIENT
Start: 2017-04-19 | End: 2017-05-05

## 2017-04-19 RX ORDER — HYDROCODONE BITARTRATE AND ACETAMINOPHEN 7.5; 325 MG/1; MG/1
1 TABLET ORAL EVERY 6 HOURS PRN
Qty: 45 TABLET | Refills: 0 | Status: SHIPPED | OUTPATIENT
Start: 2017-04-19 | End: 2017-05-19 | Stop reason: SDUPTHER

## 2017-04-19 RX ORDER — LORATADINE 10 MG/1
10 TABLET ORAL DAILY PRN
Qty: 30 TABLET | Refills: 5 | Status: SHIPPED | OUTPATIENT
Start: 2017-04-19 | End: 2017-09-15 | Stop reason: SDUPTHER

## 2017-04-19 RX ORDER — MONTELUKAST SODIUM 10 MG/1
10 TABLET ORAL NIGHTLY
Qty: 30 TABLET | Refills: 5 | Status: SHIPPED | OUTPATIENT
Start: 2017-04-19 | End: 2017-09-15 | Stop reason: SDUPTHER

## 2017-04-19 NOTE — PROGRESS NOTES
Subjective   Jaquan Mckay is a 46 y.o. male.     Chief Complaint   Patient presents with   • Follow-up       History of Present Illness     Back Pain-is going to PT 2 day per week.  Reports his pain continues to increase in his low back and into his left leg and knee.  He reports that he is having to take his pain meds BID daily and is wearing a back support as much as possible.  He reports tingling in his leg but no loss of sensation.  His last therapy session is next Wednesday.  He has been to Dr Dubin's office for eval.  Does have an appt May 2nd.  Pain has been persistently ongoing since January.  He reports changes in his ability to do ADL's and to stand.  Not at goal.   Chest Pain-seen at ED on 3/30 and was started on 2 GI meds.  He also received a GI cocktail.  He reports the CP has been better since that time.  He has not had any further episodes.  He has an upcoming cardio appt for eval.  Not at goal.   Asthma-no recent symptoms.  Tolerating inhaler use well.  He is followed by allergy and asthma speciality.  Stable.   Chronic right elbow and left knee pain-ongoing.  Has not been as prominent since onset of his back pain.  Stable.      The following portions of the patient's history were reviewed and updated as appropriate: allergies, current medications, past family history, past medical history, past social history, past surgical history and problem list.    Review of Systems   Constitutional: Positive for appetite change (increase) and fatigue. Negative for fever.   HENT: Positive for congestion, ear pain, postnasal drip, rhinorrhea, sinus pressure and sore throat.    Eyes: Negative for pain and visual disturbance (wears glasses.  recent eye exam with new glasses).   Respiratory: Positive for cough, chest tightness and shortness of breath.    Cardiovascular: Negative for chest pain (none in approx 1 month).   Gastrointestinal: Positive for abdominal pain. Negative for diarrhea.   Genitourinary: Positive  "for dysuria (chronic) and testicular pain (chronic). Negative for hematuria and urgency.   Musculoskeletal: Positive for arthralgias, back pain (with radiation to BLE \"down to ankles\" ) and myalgias.   Neurological: Positive for weakness (BLE), numbness (and tingling with \"electricity\" sensation in his legs) and headaches (intermittent.  Reports \"3 last week\" but did not sleep). Negative for dizziness.   Psychiatric/Behavioral: Positive for dysphoric mood and sleep disturbance (due to discomfort). Negative for suicidal ideas. The patient is nervous/anxious.    All other systems reviewed and are negative.      Objective     /84 (BP Location: Left arm, Patient Position: Sitting, Cuff Size: Adult)  Pulse 68  Temp 98.4 °F (36.9 °C) (Oral)   Ht 67\" (170.2 cm)  Wt 220 lb (99.8 kg)  SpO2 98%  BMI 34.46 kg/m2    Physical Exam   Constitutional: He is oriented to person, place, and time. He appears well-developed and well-nourished. He appears distressed (minimally).   HENT:   Head: Normocephalic and atraumatic.   Right Ear: Tympanic membrane, external ear and ear canal normal.   Left Ear: Tympanic membrane, external ear and ear canal normal.   Nose: No mucosal edema or rhinorrhea.   Mouth/Throat: Oropharynx is clear and moist. No oropharyngeal exudate or posterior oropharyngeal erythema.   Eyes: Conjunctivae and EOM are normal. Pupils are equal, round, and reactive to light. No scleral icterus.   Neck: Normal range of motion. Neck supple. No thyromegaly present.   Cardiovascular: Normal rate, regular rhythm and normal heart sounds.    No murmur heard.  Pulmonary/Chest: Effort normal. No respiratory distress. He has wheezes (scattered diffuse). He exhibits no tenderness.   Abdominal: Soft. Bowel sounds are normal. He exhibits no mass. There is no tenderness.   Musculoskeletal: He exhibits no edema.        Right elbow: Tenderness found. Medial epicondyle tenderness noted.        Left knee: Tenderness found. Medial " joint line and lateral joint line tenderness noted.        Lumbar back: He exhibits decreased range of motion, tenderness, pain and spasm. He exhibits no swelling.   Gait shuffling with limping noted.  More prominent on right side from behind.  Frequent position changes for sit to stand, leaning on wall and over exam table.      Skin Integrity  -  His right foot skin is intact.     Jaquan 's left foot skin is intact. .  Lymphadenopathy:        Head (right side): No submandibular adenopathy present.        Head (left side): No submandibular adenopathy present.     He has no cervical adenopathy.   Neurological: He is alert and oriented to person, place, and time. He has normal reflexes. No cranial nerve deficit. Coordination normal.   Skin: Skin is warm and dry.   Psychiatric: His speech is normal and behavior is normal. Judgment and thought content normal. His mood appears not anxious. He is not actively hallucinating. Cognition and memory are normal. He exhibits a depressed mood (but to lesser extent.  discusses a new relationship with a lady friend whom he has known for a long time). He expresses no homicidal and no suicidal ideation. He exhibits normal recent memory and normal remote memory. He is attentive.   Vitals reviewed.      Assessment/Plan     Jaquan was seen today for follow-up.    Diagnoses and all orders for this visit:    Chronic bilateral low back pain with left-sided sciatica  -     MRI Lumbar Spine Without Contrast  -     HYDROcodone-acetaminophen (NORCO) 7.5-325 MG per tablet; Take 1 tablet by mouth Every 6 (Six) Hours As Needed for Moderate Pain (4-6).    Essential hypertension  -     metoprolol succinate XL (TOPROL-XL) 25 MG 24 hr tablet; Take 1 tablet by mouth Daily.  -     lisinopril (PRINIVIL,ZESTRIL) 20 MG tablet; Take 1 tablet by mouth Daily.    Seasonal allergic rhinitis due to pollen  -     loratadine (CLARITIN) 10 MG tablet; Take 1 tablet by mouth Daily As Needed for Allergies.    Mild  intermittent asthma without complication  -     montelukast (SINGULAIR) 10 MG tablet; Take 1 tablet by mouth Every Night.    Other orders  -     Discontinue: HYDROcodone-acetaminophen (NORCO) 5-325 MG per tablet; Take 1 tablet by mouth 2 (Two) Times a Day As Needed for Moderate Pain (4-6).      CHAVEZ reviewed today and consistent.  Will refill prescribed controlled medication today.  Patient is aware they cannot receive narcotics from any other provider.  Risk and benefits of medication use has been reviewed.  History and physical exam exhibit continued safe and appropriate use of controlled substances.  Emotional support and active listening provided.  Patient provided time to verbalize feelings.  Increase in meds today.  Understands disease processes and need for medications.  Understands reasons for urgent and emergent care.  Patient (& family) verbalized agreement for treatment plan.   Refill on routine maintenance meds today.   Imaging ordered.   RTC 1 month, sooner if needed.

## 2017-04-19 NOTE — PROGRESS NOTES
Outpatient Physical Therapy Ortho Treatment Note  [facility]     Patient Name: Jaquan Mckay  : 1970  MRN: [id number]  Today's Date: 2017      Visit Date: 2017    Visit Dx:    ICD-10-CM ICD-9-CM   1. Acute bilateral low back pain with left-sided sciatica M54.42 724.2     724.3       Patient Active Problem List   Diagnosis   • GERD (gastroesophageal reflux disease)   • Arthritis   • Hypertension   • Seizure disorder   • Hyperlipidemia   • Anxiety   • Varicocele   • Varicocele present on ultrasound of scrotum   • Obesity (BMI 30.0-34.9)        Past Medical History:   Diagnosis Date   • Allergic    • Anxiety    • Arthritis    • Asthma    • Clotting disorder     had a knee surgery   • Depression    • Gastric ulcer    • GERD (gastroesophageal reflux disease)    • H/O migraine    • H/O sleep apnea    • Headache    • Heart attack    • History of epilepsy    • History of seizures    • Hyperlipidemia    • Hypertension    • Knee pain, acute     Left   • Low back pain    • Migraine    • Obesity    • Carl Mountain spotted fever    • Seizures         Past Surgical History:   Procedure Laterality Date   • BACK SURGERY     • BRAIN SURGERY      Tumor removal    • CHOLECYSTECTOMY     • KNEE SURGERY     • TUMOR EXCISION      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  -BC    Subjective Pain    Able to rate subjective pain? yes  -BC    Pre-Treatment Pain Level 9  -BC      User Key  (r) = Recorded By, (t) = Taken By, (c) = Cosigned By    Initials Name Provider Type    JUANCARLOS De Santiago, PT Physical Therapist                            PT Assessment/Plan       17 1317       PT Assessment    Assessment Comments Session initiated with ANGIE and LUC (IFC) to the lumbar region with the  patient in a supine position; no redness or skin irriation noted following modalities.  Patient then performed ther ex in supine and sitting positions; ther ex utilized to improve core and LE strength.  Patient progressed in ther ex to include increased repetitions of LTR and the addition of PPT.  Patient reported 8/10 pain at end of session.  Continue to progress therapy by POC, per patient's tolerance.  -BC     PT Plan    PT Plan Comments Progress POC, per patient's tolerance  -BC       User Key  (r) = Recorded By, (t) = Taken By, (c) = Cosigned By    Initials Name Provider Type    JUANCARLOS De Santiago, PT Physical Therapist                Modalities       04/19/17 1100          Subjective Pain    Post-Treatment Pain Level 8  -BC      Moist Heat    MH Applied Yes   No redness noted following MH  -BC      Location low back  -BC      Rx Minutes 15 mins  -BC      MH Prior to Rx Yes   MH with ESTIM in supine position  -BC      ELECTRICAL STIMULATION    Attended/Unattended Unattended   No skin irritation noted following ESTIM  -BC      Stimulation Type IFC  -BC      Max mAmp --   Per patient's tolerance  -BC      Location/Electrode Placement/Other low back  -BC      Rx Minutes 15 mins  -BC        User Key  (r) = Recorded By, (t) = Taken By, (c) = Cosigned By    Initials Name Provider Type    JUANCARLOS De Santiago, PT Physical Therapist                Exercises       04/19/17 1100          Subjective Comments    Subjective Comments Patient reports that he is experiencing 9/10 pain today; he notes the rainy weather has been making his back hurt worse.  -BC      Subjective Pain    Able to rate subjective pain? yes  -BC      Pre-Treatment Pain Level 9  -BC      Post-Treatment Pain Level 8  -BC      Exercise 1    Exercise Name 1 SKTC [id number], PPT 2x10, LTR 25x, SAQ 2x10, knee flex with YTB 10x2, hip abd with YTB 10x2, ball squeeze 10x2, seated march 10x2, LAQ 10x2  -BC      Cueing 1 Tactile;Verbal;Demo  -BC      Time  (Minutes) 1 32  -BC      Treatment Type 1 Ther Ex  -BC        User Key  (r) = Recorded By, (t) = Taken By, (c) = Cosigned By    Initials Name Provider Type    JUANCARLOS De Santiago PT Physical Therapist                                   Therapy Education       04/19/17 1316          Therapy Education    Given HEP;Symptoms/condition management;Pain management;Posture/body mechanics  -BC      Program Reinforced  -BC      How Provided Verbal;Demonstration  -BC      Level of Understanding Verbalized;Demonstrated  -BC        User Key  (r) = Recorded By, (t) = Taken By, (c) = Cosigned By    Initials Name Provider Type    JUANCARLOS De Santiago PT Physical Therapist                Time Calculation:   Start Time: 1056  Stop Time: 1150  Time Calculation (min): 54 min    Therapy Charges for Today     Code Description Service Date Service Provider Modifiers Qty    18739994293  PT THER PROC EA 15 MIN 4/19/2017 Hanna De Santiago, PT GP 2    40350815095 HC PT ELECTRICAL STIM UNATTENDED 4/19/2017 Hanna De Santiago, PT  1                    Hanna De Santiago, PT  4/19/2017

## 2017-04-26 ENCOUNTER — HOSPITAL ENCOUNTER (OUTPATIENT)
Dept: PHYSICAL THERAPY | Facility: HOSPITAL | Age: 47
Setting detail: THERAPIES SERIES
Discharge: HOME | End: 2017-04-26

## 2017-04-26 DIAGNOSIS — M54.42 ACUTE BILATERAL LOW BACK PAIN WITH LEFT-SIDED SCIATICA: Primary | ICD-10-CM

## 2017-04-26 PROCEDURE — 97110 THERAPEUTIC EXERCISES: CPT | Performed by: PHYSICAL THERAPIST

## 2017-04-26 PROCEDURE — G0283 ELEC STIM OTHER THAN WOUND: HCPCS | Performed by: PHYSICAL THERAPIST

## 2017-04-26 NOTE — THERAPY DISCHARGE NOTE
Outpatient Physical Therapy Ortho Treatment Note/Discharge Summary  [facility]     Patient Name: Jaquan Mckay  : 1970  MRN: [id number]  Today's Date: 2017      Visit Date: 2017    Visit Dx:    ICD-10-CM ICD-9-CM   1. Acute bilateral low back pain with left-sided sciatica M54.42 724.2     724.3       Patient Active Problem List   Diagnosis   • GERD (gastroesophageal reflux disease)   • Arthritis   • Hypertension   • Seizure disorder   • Hyperlipidemia   • Anxiety   • Varicocele   • Varicocele present on ultrasound of scrotum   • Obesity (BMI 30.0-34.9)   • Chronic bilateral low back pain with left-sided sciatica   • Seasonal allergic rhinitis due to pollen   • Mild intermittent asthma without complication        Past Medical History:   Diagnosis Date   • Allergic    • Anxiety    • Arthritis    • Asthma    • Clotting disorder     had a knee surgery   • Depression    • Gastric ulcer    • GERD (gastroesophageal reflux disease)    • H/O migraine    • H/O sleep apnea    • Headache    • Heart attack    • History of epilepsy    • History of seizures    • Hyperlipidemia    • Hypertension    • Knee pain, acute     Left   • Low back pain    • Migraine    • Obesity    • Carl Mountain spotted fever    • Seizures         Past Surgical History:   Procedure Laterality Date   • BACK SURGERY     • BRAIN SURGERY      Tumor removal    • CHOLECYSTECTOMY     • KNEE SURGERY     • TUMOR EXCISION      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 He states he sees his doctor next week and does not feel as though physical therapy is helping.  -AD    Subjective Pain    Able to rate subjective pain? yes  -AD    Pre-Treatment Pain Level 9  -AD    Post-Treatment Pain Level 9  -AD    Myotomal Screen- Lower Quarter Clearing    Hip flexion (L2) Bilateral:;4+ (Good +)   -AD    Knee extension (L3) Bilateral:;4 (Good)  -AD    Ankle DF (L4) Bilateral:;4+ (Good +)  -AD    Knee flexion (S2) Bilateral:;4 (Good)  -AD    Lumbar ROM Screen- Lower Quarter Clearing    Lumbar Flexion Impaired   25%  -AD    Lumbar Extension Impaired   25%  -AD    Lumbar Lateral Flexion Impaired   25%  -AD    Lumbar Rotation Impaired   25%  -AD      User Key  (r) = Recorded By, (t) = Taken By, (c) = Cosigned By    Initials Name Provider Type    AD Ashley Claudene Dalton, PT Physical Therapist                            PT Assessment/Plan       04/26/17 1148       PT Assessment    Functional Limitations Decreased safety during functional activities;Impaired gait;Limitations in community activities;Limitation in home management;Performance in self-care ADL;Performance in leisure activities;Limitations in functional capacity and performance  -AD     Impairments Balance;Gait;Range of motion;Sensation;Muscle strength;Pain;Posture;Joint mobility  -AD     Assessment Comments A re-assessment was performed during today's treatment session. The patient demonstrated minimal progress with skilled physical therapy. Based on findings, the patient is being discharged due to lack of progress and no further approved visits. Discharge planning was discussed with the patient, who agreed physical therapy has not been beneficial. He reports he returns to his physician next week regarding back pain. The patient was provided with a written HEP to perform following discharge. The exercises were performed by the patient during today's session to ensure proper form. No adverse reactions were observed following moist heat combined with IFC in the supine position at the beginning of the session.   -AD     PT Plan    PT Plan Comments Discharge from PT at this time due to lack of progress.  -AD       User Key  (r) = Recorded By, (t) = Taken By, (c) = Cosigned By    Initials Name Provider Type    AD Ashley Claudene Dalton, PT Physical  Therapist                Modalities       04/26/17 1100          Moist Heat    Location low back  -AD      Rx Minutes 10 mins  -AD      MH Prior to Rx Yes   MH with ESTIM in supine position  -AD      ELECTRICAL STIMULATION    Attended/Unattended Unattended   No skin irritation noted following ESTIM  -AD      Stimulation Type IFC  -AD      Max mAmp --   Per patient's tolerance  -AD      Location/Electrode Placement/Other low back  -AD      Rx Minutes 15 mins  -AD        User Key  (r) = Recorded By, (t) = Taken By, (c) = Cosigned By    Initials Name Provider Type    AD Ashley Claudene Dalton, PT Physical Therapist                Exercises       04/26/17 1100          Subjective Comments    Subjective Comments Pt reports 9/10 low back pain prior to the treatment session. He states he sees his doctor next week and does not feel as though physical therapy is helping.  -AD      Subjective Pain    Able to rate subjective pain? yes  -AD      Pre-Treatment Pain Level 9  -AD      Post-Treatment Pain Level 9  -AD      Exercise 1    Exercise Name 1 Standing march, mini squats, heel raises, standing hip abduction, standing hip adduction, supine hip abduction, supine heel slides, SAQ, bridges  -AD      Cueing 1 Verbal;Tactile;Demo   Written HEP  -AD      Sets 1 1  -AD      Reps 1 10  -AD      Time (Minutes) 1 20 minutes  -AD      Treatment Type 1 Ther Ex  -AD        User Key  (r) = Recorded By, (t) = Taken By, (c) = Cosigned By    Initials Name Provider Type    AD Ashley Claudene Dalton, PT Physical Therapist                               PT OP Goals       04/26/17 1100       PT Short Term Goals    STG 1 Pt will be instructed in HEP for improved independence.  -AD     STG 1 Progress Met  -AD     STG 2 Pt will report pain no greater than 7/10 with daily activities for improved activity.  -AD     STG 2 Progress Not Met  -AD     STG 2 Progress Comments 9/10 per pt report  -AD     STG 3 Pt will demonstrate 50% lumbar ROM in all  planes for improved function.  -AD     STG 3 Progress Not Met  -AD     STG 3 Progress Comments 25%  -AD     Long Term Goals    LTG 1 Pt will demonstrate 4+/5 BLE strength for improved function.  -AD     LTG 1 Progress Not Met  -AD     LTG 1 Progress Comments 4/5 to 4+/5  -AD     LTG 2 Pt will report a maximum low back and LLE pain of 5/10 for improved independence and activity.  -AD     LTG 2 Progress Not Met  -AD     LTG 2 Progress Comments 9/10 per pt report  -AD     LTG 3 Pt will report less than 20% impairment on the Modified Oswestry for improved functional independence.  -AD     LTG 3 Progress Not Met  -AD     LTG 3 Progress Comments 40% impairment  -AD     LTG 4 Pt will report centralization of left leg radicular symptoms.  -AD     LTG 4 Progress Not Met  -AD     LTG 4 Progress Comments Pt reports no improvement  -AD       User Key  (r) = Recorded By, (t) = Taken By, (c) = Cosigned By    Initials Name Provider Type    AD Ashley Claudene Dalton, PT Physical Therapist                Therapy Education       04/26/17 1146          Therapy Education    Given HEP;Symptoms/condition management;Pain management;Posture/body mechanics  -AD      Program Reinforced  -AD      How Provided Verbal;Demonstration;Written  -AD      Provided to Patient  -AD      Level of Understanding Verbalized;Demonstrated  -AD        User Key  (r) = Recorded By, (t) = Taken By, (c) = Cosigned By    Initials Name Provider Type    AD Ashley Claudene Dalton, PT Physical Therapist                Outcome Measures       04/26/17 1100          Modified Oswestry    Modified Oswestry Score/Comments 20/50  -AD        User Key  (r) = Recorded By, (t) = Taken By, (c) = Cosigned By    Initials Name Provider Type    AD Ashley Claudene Dalton, PT Physical Therapist            Time Calculation:   Start Time: 1100  Stop Time: 1140  Time Calculation (min): 40 min    Therapy Charges for Today     Code Description Service Date Service Provider Modifiers Qty     15457285007  PT THER PROC EA 15 MIN 4/26/2017 Ashley Claudene Dalton, PT GP 1    56721312696 HC PT ELECTRICAL STIM UNATTENDED 4/26/2017 Ashley Claudene Dalton, PT  1    93996648980 HC PT CARE PLAN EACH 15 MIN 4/26/2017 Ashley Claudene Dalton, PT GP 1                OP PT Discharge Summary  Date of Discharge: 04/26/17  Reason for Discharge: Lack of progress  Outcomes Achieved: Unable to make functional progress toward goals at this time  Discharge Destination: Home with home program  Discharge Instructions: Due to lack of progress and patient having no more approved physical therapy visits, the patient is being discharged from skilled physical therapy at this time. He was provided with a written HEP which was performed by the patient during today's treatment session. He was instructed to contact the physical therapy clinic with any questions or concerns.      Ashley Claudene Dalton, PT  4/26/2017

## 2017-05-05 ENCOUNTER — OFFICE VISIT (OUTPATIENT)
Dept: CARDIOLOGY | Facility: CLINIC | Age: 47
End: 2017-05-05

## 2017-05-05 VITALS
SYSTOLIC BLOOD PRESSURE: 133 MMHG | HEART RATE: 77 BPM | BODY MASS INDEX: 34.06 KG/M2 | WEIGHT: 217 LBS | HEIGHT: 67 IN | DIASTOLIC BLOOD PRESSURE: 88 MMHG

## 2017-05-05 DIAGNOSIS — F41.9 ANXIETY: ICD-10-CM

## 2017-05-05 DIAGNOSIS — K21.9 GASTROESOPHAGEAL REFLUX DISEASE WITHOUT ESOPHAGITIS: ICD-10-CM

## 2017-05-05 DIAGNOSIS — I10 ESSENTIAL HYPERTENSION: ICD-10-CM

## 2017-05-05 DIAGNOSIS — E78.00 PURE HYPERCHOLESTEROLEMIA: ICD-10-CM

## 2017-05-05 DIAGNOSIS — E66.9 OBESITY (BMI 30.0-34.9): ICD-10-CM

## 2017-05-05 DIAGNOSIS — R07.9 CHEST PAIN, UNSPECIFIED TYPE: Primary | ICD-10-CM

## 2017-05-05 PROCEDURE — 93000 ELECTROCARDIOGRAM COMPLETE: CPT | Performed by: INTERNAL MEDICINE

## 2017-05-05 PROCEDURE — 99214 OFFICE O/P EST MOD 30 MIN: CPT | Performed by: INTERNAL MEDICINE

## 2017-05-05 RX ORDER — ISOSORBIDE MONONITRATE 30 MG/1
30 TABLET, EXTENDED RELEASE ORAL DAILY
Qty: 30 TABLET | Refills: 3 | Status: SHIPPED | OUTPATIENT
Start: 2017-05-05 | End: 2017-05-31

## 2017-05-05 RX ORDER — NITROGLYCERIN 0.4 MG/1
TABLET SUBLINGUAL
Qty: 25 TABLET | Refills: 3 | Status: SHIPPED | OUTPATIENT
Start: 2017-05-05 | End: 2017-09-18 | Stop reason: SDUPTHER

## 2017-05-13 ENCOUNTER — HOSPITAL ENCOUNTER (EMERGENCY)
Facility: HOSPITAL | Age: 47
Discharge: HOME | End: 2017-05-13
Attending: EMERGENCY MEDICINE | Admitting: EMERGENCY MEDICINE

## 2017-05-13 VITALS
TEMPERATURE: 98.3 F | HEART RATE: 85 BPM | DIASTOLIC BLOOD PRESSURE: 83 MMHG | WEIGHT: 215 LBS | RESPIRATION RATE: 18 BRPM | BODY MASS INDEX: 33.74 KG/M2 | OXYGEN SATURATION: 97 % | SYSTOLIC BLOOD PRESSURE: 122 MMHG | HEIGHT: 67 IN

## 2017-05-13 DIAGNOSIS — K21.9 GASTROESOPHAGEAL REFLUX DISEASE, ESOPHAGITIS PRESENCE NOT SPECIFIED: ICD-10-CM

## 2017-05-13 DIAGNOSIS — R11.2 NON-INTRACTABLE VOMITING WITH NAUSEA, UNSPECIFIED VOMITING TYPE: Primary | ICD-10-CM

## 2017-05-13 DIAGNOSIS — R07.9 CHEST PAIN, UNSPECIFIED TYPE: ICD-10-CM

## 2017-05-13 LAB
ALBUMIN SERPL-MCNC: 4.1 G/DL (ref 3.5–5)
ALBUMIN/GLOB SERPL: 1 G/DL (ref 1.5–2.5)
ALP SERPL-CCNC: 87 U/L (ref 40–129)
ALT SERPL W P-5'-P-CCNC: 17 U/L (ref 10–44)
AMYLASE SERPL-CCNC: 47 U/L (ref 28–100)
ANION GAP SERPL CALCULATED.3IONS-SCNC: 7.4 MMOL/L (ref 3.6–11.2)
AST SERPL-CCNC: 24 U/L (ref 10–34)
BASOPHILS # BLD AUTO: 0.02 10*3/MM3 (ref 0–0.3)
BASOPHILS NFR BLD AUTO: 0.2 % (ref 0–2)
BILIRUB SERPL-MCNC: 0.8 MG/DL (ref 0.2–1.8)
BILIRUB UR QL STRIP: NEGATIVE
BUN BLD-MCNC: 14 MG/DL (ref 7–21)
BUN/CREAT SERPL: 14.3 (ref 7–25)
CALCIUM SPEC-SCNC: 9.2 MG/DL (ref 7.7–10)
CHLORIDE SERPL-SCNC: 105 MMOL/L (ref 99–112)
CK MB SERPL-CCNC: 1.19 NG/ML (ref 0–5)
CK MB SERPL-RTO: 0.9 % (ref 0–3)
CK SERPL-CCNC: 134 U/L (ref 24–204)
CLARITY UR: CLEAR
CO2 SERPL-SCNC: 27.6 MMOL/L (ref 24.3–31.9)
COLOR UR: ABNORMAL
CREAT BLD-MCNC: 0.98 MG/DL (ref 0.43–1.29)
DEPRECATED RDW RBC AUTO: 43.5 FL (ref 37–54)
EOSINOPHIL # BLD AUTO: 0.12 10*3/MM3 (ref 0–0.7)
EOSINOPHIL NFR BLD AUTO: 1 % (ref 0–5)
ERYTHROCYTE [DISTWIDTH] IN BLOOD BY AUTOMATED COUNT: 13.2 % (ref 11.5–14.5)
GFR SERPL CREATININE-BSD FRML MDRD: 83 ML/MIN/1.73
GLOBULIN UR ELPH-MCNC: 4 GM/DL
GLUCOSE BLD-MCNC: 130 MG/DL (ref 70–110)
GLUCOSE UR STRIP-MCNC: NEGATIVE MG/DL
HCT VFR BLD AUTO: 47.4 % (ref 42–52)
HGB BLD-MCNC: 16.4 G/DL (ref 14–18)
HGB UR QL STRIP.AUTO: NEGATIVE
IMM GRANULOCYTES # BLD: 0.02 10*3/MM3 (ref 0–0.03)
IMM GRANULOCYTES NFR BLD: 0.2 % (ref 0–0.5)
KETONES UR QL STRIP: ABNORMAL
LEUKOCYTE ESTERASE UR QL STRIP.AUTO: NEGATIVE
LIPASE SERPL-CCNC: 28 U/L (ref 13–60)
LYMPHOCYTES # BLD AUTO: 0.36 10*3/MM3 (ref 1–3)
LYMPHOCYTES NFR BLD AUTO: 3 % (ref 21–51)
MCH RBC QN AUTO: 32.1 PG (ref 27–33)
MCHC RBC AUTO-ENTMCNC: 34.6 G/DL (ref 33–37)
MCV RBC AUTO: 92.8 FL (ref 80–94)
MONOCYTES # BLD AUTO: 0.85 10*3/MM3 (ref 0.1–0.9)
MONOCYTES NFR BLD AUTO: 7.2 % (ref 0–10)
MYOGLOBIN SERPL-MCNC: 41 NG/ML (ref 0–109)
NEUTROPHILS # BLD AUTO: 10.45 10*3/MM3 (ref 1.4–6.5)
NEUTROPHILS NFR BLD AUTO: 88.4 % (ref 30–70)
NITRITE UR QL STRIP: NEGATIVE
OSMOLALITY SERPL CALC.SUM OF ELEC: 281.6 MOSM/KG (ref 273–305)
PH UR STRIP.AUTO: 7 [PH] (ref 5–8)
PLATELET # BLD AUTO: 155 10*3/MM3 (ref 130–400)
PMV BLD AUTO: 9.9 FL (ref 6–10)
POTASSIUM BLD-SCNC: 4.3 MMOL/L (ref 3.5–5.3)
PROT SERPL-MCNC: 8.1 G/DL (ref 6–8)
PROT UR QL STRIP: NEGATIVE
RBC # BLD AUTO: 5.11 10*6/MM3 (ref 4.7–6.1)
SODIUM BLD-SCNC: 140 MMOL/L (ref 135–153)
SP GR UR STRIP: 1.03 (ref 1–1.03)
TROPONIN I SERPL-MCNC: <0.006 NG/ML
UROBILINOGEN UR QL STRIP: ABNORMAL
WBC NRBC COR # BLD: 11.82 10*3/MM3 (ref 4.5–12.5)

## 2017-05-13 PROCEDURE — 82150 ASSAY OF AMYLASE: CPT | Performed by: EMERGENCY MEDICINE

## 2017-05-13 PROCEDURE — 80053 COMPREHEN METABOLIC PANEL: CPT | Performed by: EMERGENCY MEDICINE

## 2017-05-13 PROCEDURE — 85025 COMPLETE CBC W/AUTO DIFF WBC: CPT | Performed by: EMERGENCY MEDICINE

## 2017-05-13 PROCEDURE — 83690 ASSAY OF LIPASE: CPT | Performed by: EMERGENCY MEDICINE

## 2017-05-13 PROCEDURE — 82553 CREATINE MB FRACTION: CPT | Performed by: EMERGENCY MEDICINE

## 2017-05-13 PROCEDURE — 99284 EMERGENCY DEPT VISIT MOD MDM: CPT

## 2017-05-13 PROCEDURE — 81003 URINALYSIS AUTO W/O SCOPE: CPT | Performed by: EMERGENCY MEDICINE

## 2017-05-13 PROCEDURE — 93005 ELECTROCARDIOGRAM TRACING: CPT | Performed by: EMERGENCY MEDICINE

## 2017-05-13 PROCEDURE — 84484 ASSAY OF TROPONIN QUANT: CPT | Performed by: EMERGENCY MEDICINE

## 2017-05-13 PROCEDURE — 93010 ELECTROCARDIOGRAM REPORT: CPT | Performed by: INTERNAL MEDICINE

## 2017-05-13 PROCEDURE — 83874 ASSAY OF MYOGLOBIN: CPT | Performed by: EMERGENCY MEDICINE

## 2017-05-13 PROCEDURE — 82550 ASSAY OF CK (CPK): CPT | Performed by: EMERGENCY MEDICINE

## 2017-05-13 RX ORDER — FAMOTIDINE 20 MG/1
20 TABLET, FILM COATED ORAL NIGHTLY
Qty: 30 TABLET | Refills: 0 | Status: SHIPPED | OUTPATIENT
Start: 2017-05-13 | End: 2017-06-19

## 2017-05-13 RX ORDER — ONDANSETRON 4 MG/1
4 TABLET, ORALLY DISINTEGRATING ORAL ONCE
Status: COMPLETED | OUTPATIENT
Start: 2017-05-13 | End: 2017-05-13

## 2017-05-13 RX ORDER — ALUMINA, MAGNESIA, AND SIMETHICONE 2400; 2400; 240 MG/30ML; MG/30ML; MG/30ML
15 SUSPENSION ORAL ONCE
Status: COMPLETED | OUTPATIENT
Start: 2017-05-13 | End: 2017-05-13

## 2017-05-13 RX ORDER — ASPIRIN 81 MG/1
324 TABLET, CHEWABLE ORAL ONCE
Status: COMPLETED | OUTPATIENT
Start: 2017-05-13 | End: 2017-05-13

## 2017-05-13 RX ORDER — PROMETHAZINE HYDROCHLORIDE 25 MG/1
25 SUPPOSITORY RECTAL EVERY 6 HOURS PRN
Qty: 12 SUPPOSITORY | Refills: 0 | Status: SHIPPED | OUTPATIENT
Start: 2017-05-13 | End: 2017-10-02

## 2017-05-13 RX ADMIN — ONDANSETRON 4 MG: 4 TABLET, ORALLY DISINTEGRATING ORAL at 20:20

## 2017-05-13 RX ADMIN — ASPIRIN 324 MG: 81 TABLET, CHEWABLE ORAL at 20:18

## 2017-05-13 RX ADMIN — ALUMINUM HYDROXIDE, MAGNESIUM HYDROXIDE, AND DIMETHICONE 15 ML: 400; 400; 40 SUSPENSION ORAL at 20:19

## 2017-05-13 RX ADMIN — LIDOCAINE HYDROCHLORIDE 15 ML: 20 SOLUTION ORAL; TOPICAL at 20:19

## 2017-05-19 ENCOUNTER — OFFICE VISIT (OUTPATIENT)
Dept: FAMILY MEDICINE CLINIC | Facility: CLINIC | Age: 47
End: 2017-05-19

## 2017-05-19 VITALS
BODY MASS INDEX: 34.37 KG/M2 | HEART RATE: 66 BPM | OXYGEN SATURATION: 98 % | TEMPERATURE: 97.8 F | SYSTOLIC BLOOD PRESSURE: 125 MMHG | HEIGHT: 67 IN | WEIGHT: 219 LBS | DIASTOLIC BLOOD PRESSURE: 85 MMHG

## 2017-05-19 DIAGNOSIS — M79.672 LEFT FOOT PAIN: ICD-10-CM

## 2017-05-19 DIAGNOSIS — R30.0 DYSURIA: ICD-10-CM

## 2017-05-19 DIAGNOSIS — R07.9 CHEST PAIN, UNSPECIFIED TYPE: ICD-10-CM

## 2017-05-19 DIAGNOSIS — R53.83 OTHER FATIGUE: ICD-10-CM

## 2017-05-19 DIAGNOSIS — E78.00 PURE HYPERCHOLESTEROLEMIA: ICD-10-CM

## 2017-05-19 DIAGNOSIS — R53.83 DECREASED ENERGY: ICD-10-CM

## 2017-05-19 DIAGNOSIS — M54.42 CHRONIC BILATERAL LOW BACK PAIN WITH LEFT-SIDED SCIATICA: Primary | ICD-10-CM

## 2017-05-19 DIAGNOSIS — F41.9 ANXIETY: ICD-10-CM

## 2017-05-19 DIAGNOSIS — G40.909 SEIZURE DISORDER (HCC): ICD-10-CM

## 2017-05-19 DIAGNOSIS — E55.9 VITAMIN D DEFICIENCY: ICD-10-CM

## 2017-05-19 DIAGNOSIS — G89.29 CHRONIC BILATERAL LOW BACK PAIN WITH LEFT-SIDED SCIATICA: Primary | ICD-10-CM

## 2017-05-19 DIAGNOSIS — I10 ESSENTIAL HYPERTENSION: ICD-10-CM

## 2017-05-19 DIAGNOSIS — J45.20 MILD INTERMITTENT ASTHMA WITHOUT COMPLICATION: ICD-10-CM

## 2017-05-19 LAB
6-ACETYL MORPHINE: NEGATIVE
AMPHET+METHAMPHET UR QL: POSITIVE
BARBITURATES UR QL SCN: NEGATIVE
BENZODIAZ UR QL SCN: NEGATIVE
BILIRUB BLD-MCNC: NEGATIVE MG/DL
BUPRENORPHINE SERPL-MCNC: NEGATIVE NG/ML
CANNABINOIDS SERPL QL: NEGATIVE
CLARITY, POC: ABNORMAL
COCAINE UR QL: NEGATIVE
COLOR UR: ABNORMAL
GLUCOSE UR STRIP-MCNC: NEGATIVE MG/DL
KETONES UR QL: NEGATIVE
LEUKOCYTE EST, POC: NEGATIVE
MDMA UR QL SCN: NEGATIVE
METHADONE UR QL SCN: NEGATIVE
NITRITE UR-MCNC: NEGATIVE MG/ML
OPIATES UR QL: NEGATIVE
OXYCODONE UR QL SCN: NEGATIVE
PCP UR QL SCN: NEGATIVE
PH UR: 6 [PH] (ref 5–8)
PROT UR STRIP-MCNC: NEGATIVE MG/DL
RBC # UR STRIP: NEGATIVE /UL
SP GR UR: 1.03 (ref 1–1.03)
UROBILINOGEN UR QL: ABNORMAL

## 2017-05-19 PROCEDURE — 80307 DRUG TEST PRSMV CHEM ANLYZR: CPT | Performed by: NURSE PRACTITIONER

## 2017-05-19 PROCEDURE — 99214 OFFICE O/P EST MOD 30 MIN: CPT | Performed by: NURSE PRACTITIONER

## 2017-05-19 PROCEDURE — 81003 URINALYSIS AUTO W/O SCOPE: CPT | Performed by: NURSE PRACTITIONER

## 2017-05-19 RX ORDER — HYDROCODONE BITARTRATE AND ACETAMINOPHEN 7.5; 325 MG/1; MG/1
1 TABLET ORAL EVERY 6 HOURS PRN
Qty: 60 TABLET | Refills: 0 | Status: SHIPPED | OUTPATIENT
Start: 2017-05-19 | End: 2017-06-19 | Stop reason: SDUPTHER

## 2017-05-24 ENCOUNTER — HOSPITAL ENCOUNTER (OUTPATIENT)
Dept: CARDIOLOGY | Facility: HOSPITAL | Age: 47
Discharge: HOME | End: 2017-05-24
Attending: INTERNAL MEDICINE

## 2017-05-24 ENCOUNTER — HOSPITAL ENCOUNTER (OUTPATIENT)
Dept: NUCLEAR MEDICINE | Facility: HOSPITAL | Age: 47
Discharge: HOME | End: 2017-05-24
Attending: INTERNAL MEDICINE

## 2017-05-24 ENCOUNTER — LAB (OUTPATIENT)
Dept: LAB | Facility: HOSPITAL | Age: 47
End: 2017-05-24

## 2017-05-24 ENCOUNTER — HOSPITAL ENCOUNTER (OUTPATIENT)
Dept: GENERAL RADIOLOGY | Facility: HOSPITAL | Age: 47
Discharge: HOME | End: 2017-05-24
Admitting: NURSE PRACTITIONER

## 2017-05-24 VITALS — WEIGHT: 212 LBS | HEIGHT: 67 IN | BODY MASS INDEX: 33.27 KG/M2

## 2017-05-24 DIAGNOSIS — R07.9 CHEST PAIN, UNSPECIFIED TYPE: ICD-10-CM

## 2017-05-24 DIAGNOSIS — G40.909 SEIZURE DISORDER (HCC): ICD-10-CM

## 2017-05-24 DIAGNOSIS — R53.83 DECREASED ENERGY: ICD-10-CM

## 2017-05-24 DIAGNOSIS — E55.9 VITAMIN D DEFICIENCY: ICD-10-CM

## 2017-05-24 DIAGNOSIS — E78.00 PURE HYPERCHOLESTEROLEMIA: ICD-10-CM

## 2017-05-24 DIAGNOSIS — I10 ESSENTIAL HYPERTENSION: ICD-10-CM

## 2017-05-24 LAB
25(OH)D3 SERPL-MCNC: 30 NG/ML
ALBUMIN SERPL-MCNC: 4.1 G/DL (ref 3.5–5)
ALBUMIN/GLOB SERPL: 1 G/DL (ref 1.5–2.5)
ALP SERPL-CCNC: 76 U/L (ref 40–129)
ALT SERPL W P-5'-P-CCNC: 23 U/L (ref 10–44)
ANION GAP SERPL CALCULATED.3IONS-SCNC: 7.4 MMOL/L (ref 3.6–11.2)
AST SERPL-CCNC: 24 U/L (ref 10–34)
BASOPHILS # BLD AUTO: 0.02 10*3/MM3 (ref 0–0.3)
BASOPHILS NFR BLD AUTO: 0.3 % (ref 0–2)
BILIRUB SERPL-MCNC: 0.4 MG/DL (ref 0.2–1.8)
BUN BLD-MCNC: 13 MG/DL (ref 7–21)
BUN/CREAT SERPL: 14.1 (ref 7–25)
CALCIUM SPEC-SCNC: 9.3 MG/DL (ref 7.7–10)
CHLORIDE SERPL-SCNC: 106 MMOL/L (ref 99–112)
CHOLEST SERPL-MCNC: 214 MG/DL (ref 0–200)
CO2 SERPL-SCNC: 29.6 MMOL/L (ref 24.3–31.9)
CREAT BLD-MCNC: 0.92 MG/DL (ref 0.43–1.29)
DEPRECATED RDW RBC AUTO: 43.7 FL (ref 37–54)
EOSINOPHIL # BLD AUTO: 0.06 10*3/MM3 (ref 0–0.7)
EOSINOPHIL NFR BLD AUTO: 1 % (ref 0–5)
ERYTHROCYTE [DISTWIDTH] IN BLOOD BY AUTOMATED COUNT: 12.7 % (ref 11.5–14.5)
GFR SERPL CREATININE-BSD FRML MDRD: 89 ML/MIN/1.73
GLOBULIN UR ELPH-MCNC: 4 GM/DL
GLUCOSE BLD-MCNC: 99 MG/DL (ref 70–110)
HBA1C MFR BLD: 5.1 % (ref 4.5–5.7)
HCT VFR BLD AUTO: 46.1 % (ref 42–52)
HDLC SERPL-MCNC: 66 MG/DL (ref 60–100)
HGB BLD-MCNC: 16 G/DL (ref 14–18)
IMM GRANULOCYTES # BLD: 0.01 10*3/MM3 (ref 0–0.03)
IMM GRANULOCYTES NFR BLD: 0.2 % (ref 0–0.5)
LDLC SERPL CALC-MCNC: 130 MG/DL (ref 0–100)
LDLC/HDLC SERPL: 1.97 {RATIO}
LYMPHOCYTES # BLD AUTO: 2.18 10*3/MM3 (ref 1–3)
LYMPHOCYTES NFR BLD AUTO: 37.3 % (ref 21–51)
MCH RBC QN AUTO: 32.5 PG (ref 27–33)
MCHC RBC AUTO-ENTMCNC: 34.7 G/DL (ref 33–37)
MCV RBC AUTO: 93.5 FL (ref 80–94)
MONOCYTES # BLD AUTO: 0.98 10*3/MM3 (ref 0.1–0.9)
MONOCYTES NFR BLD AUTO: 16.8 % (ref 0–10)
NEUTROPHILS # BLD AUTO: 2.6 10*3/MM3 (ref 1.4–6.5)
NEUTROPHILS NFR BLD AUTO: 44.4 % (ref 30–70)
OSMOLALITY SERPL CALC.SUM OF ELEC: 285.1 MOSM/KG (ref 273–305)
PLATELET # BLD AUTO: 198 10*3/MM3 (ref 130–400)
PMV BLD AUTO: 10.1 FL (ref 6–10)
POTASSIUM BLD-SCNC: 4.3 MMOL/L (ref 3.5–5.3)
PROT SERPL-MCNC: 8.1 G/DL (ref 6–8)
RBC # BLD AUTO: 4.93 10*6/MM3 (ref 4.7–6.1)
SODIUM BLD-SCNC: 143 MMOL/L (ref 135–153)
T4 FREE SERPL-MCNC: 1 NG/DL (ref 0.89–1.76)
TRIGL SERPL-MCNC: 90 MG/DL (ref 0–150)
TSH SERPL DL<=0.05 MIU/L-ACNC: 1.97 MIU/ML (ref 0.55–4.78)
VIT B12 BLD-MCNC: 538 PG/ML (ref 211–911)
VLDLC SERPL-MCNC: 18 MG/DL
WBC NRBC COR # BLD: 5.85 10*3/MM3 (ref 4.5–12.5)

## 2017-05-24 PROCEDURE — 82607 VITAMIN B-12: CPT | Performed by: NURSE PRACTITIONER

## 2017-05-24 PROCEDURE — 83036 HEMOGLOBIN GLYCOSYLATED A1C: CPT | Performed by: NURSE PRACTITIONER

## 2017-05-24 PROCEDURE — 82306 VITAMIN D 25 HYDROXY: CPT | Performed by: NURSE PRACTITIONER

## 2017-05-24 PROCEDURE — 36415 COLL VENOUS BLD VENIPUNCTURE: CPT

## 2017-05-24 PROCEDURE — 0 TECHNETIUM SESTAMIBI: Performed by: INTERNAL MEDICINE

## 2017-05-24 PROCEDURE — 84439 ASSAY OF FREE THYROXINE: CPT | Performed by: NURSE PRACTITIONER

## 2017-05-24 PROCEDURE — 93018 CV STRESS TEST I&R ONLY: CPT | Performed by: INTERNAL MEDICINE

## 2017-05-24 PROCEDURE — 73630 X-RAY EXAM OF FOOT: CPT

## 2017-05-24 PROCEDURE — 78451 HT MUSCLE IMAGE SPECT SING: CPT

## 2017-05-24 PROCEDURE — 80061 LIPID PANEL: CPT | Performed by: NURSE PRACTITIONER

## 2017-05-24 PROCEDURE — 93017 CV STRESS TEST TRACING ONLY: CPT

## 2017-05-24 PROCEDURE — 78452 HT MUSCLE IMAGE SPECT MULT: CPT | Performed by: INTERNAL MEDICINE

## 2017-05-24 PROCEDURE — 73630 X-RAY EXAM OF FOOT: CPT | Performed by: RADIOLOGY

## 2017-05-24 PROCEDURE — A9500 TC99M SESTAMIBI: HCPCS | Performed by: INTERNAL MEDICINE

## 2017-05-24 PROCEDURE — 80050 GENERAL HEALTH PANEL: CPT | Performed by: NURSE PRACTITIONER

## 2017-05-24 PROCEDURE — 80186 ASSAY OF PHENYTOIN FREE: CPT | Performed by: NURSE PRACTITIONER

## 2017-05-24 RX ADMIN — Medication 1 DOSE: at 09:15

## 2017-05-24 RX ADMIN — Medication 1 DOSE: at 07:35

## 2017-05-25 ENCOUNTER — OFFICE VISIT (OUTPATIENT)
Dept: NEUROSURGERY | Facility: CLINIC | Age: 47
End: 2017-05-25

## 2017-05-25 VITALS
RESPIRATION RATE: 16 BRPM | WEIGHT: 215 LBS | BODY MASS INDEX: 33.74 KG/M2 | TEMPERATURE: 98.5 F | HEART RATE: 66 BPM | OXYGEN SATURATION: 100 % | HEIGHT: 67 IN | SYSTOLIC BLOOD PRESSURE: 126 MMHG | DIASTOLIC BLOOD PRESSURE: 87 MMHG

## 2017-05-25 DIAGNOSIS — M54.50 CHRONIC BILATERAL LOW BACK PAIN WITHOUT SCIATICA: Primary | ICD-10-CM

## 2017-05-25 DIAGNOSIS — G89.29 CHRONIC BILATERAL LOW BACK PAIN WITHOUT SCIATICA: Primary | ICD-10-CM

## 2017-05-25 DIAGNOSIS — M47.817 LUMBOSACRAL SPONDYLOSIS WITHOUT MYELOPATHY: ICD-10-CM

## 2017-05-25 LAB
BH CV NUCLEAR PRIOR STUDY: 3
BH CV STRESS BP STAGE 1: NORMAL
BH CV STRESS BP STAGE 2: NORMAL
BH CV STRESS BP STAGE 3: NORMAL
BH CV STRESS DURATION MIN STAGE 1: 3
BH CV STRESS DURATION MIN STAGE 2: 3
BH CV STRESS DURATION MIN STAGE 3: 3
BH CV STRESS DURATION SEC STAGE 1: 0
BH CV STRESS DURATION SEC STAGE 2: 0
BH CV STRESS DURATION SEC STAGE 3: 30
BH CV STRESS GRADE STAGE 1: 10
BH CV STRESS GRADE STAGE 2: 12
BH CV STRESS GRADE STAGE 3: 15
BH CV STRESS HR STAGE 1: 90
BH CV STRESS HR STAGE 2: 103
BH CV STRESS HR STAGE 3: 120
BH CV STRESS METS STAGE 1: 5
BH CV STRESS METS STAGE 2: 7.5
BH CV STRESS METS STAGE 3: 10
BH CV STRESS PROTOCOL 1: NORMAL
BH CV STRESS RECOVERY BP: NORMAL MMHG
BH CV STRESS RECOVERY HR: 63 BPM
BH CV STRESS SPEED STAGE 1: 1.7
BH CV STRESS SPEED STAGE 2: 2.5
BH CV STRESS SPEED STAGE 3: 3.6
BH CV STRESS STAGE 1: 1
BH CV STRESS STAGE 2: 2
BH CV STRESS STAGE 3: 3
LV EF NUC BP: 60 %
MAXIMAL PREDICTED HEART RATE: 176 BPM
PERCENT MAX PREDICTED HR: 68.18 %
STRESS BASELINE BP: NORMAL MMHG
STRESS BASELINE HR: 66 BPM
STRESS PERCENT HR: 80 %
STRESS POST ESTIMATED WORKLOAD: 10.1 METS
STRESS POST EXERCISE DUR MIN: 9 MIN
STRESS POST EXERCISE DUR SEC: 30 SEC
STRESS POST PEAK BP: NORMAL MMHG
STRESS POST PEAK HR: 120 BPM
STRESS TARGET HR: 150 BPM

## 2017-05-25 PROCEDURE — 99244 OFF/OP CNSLTJ NEW/EST MOD 40: CPT | Performed by: NEUROLOGICAL SURGERY

## 2017-05-26 LAB — PHENYTOIN FREE SERPL-MCNC: 1.1 UG/ML (ref 1–2)

## 2017-05-30 DIAGNOSIS — I10 ESSENTIAL HYPERTENSION: ICD-10-CM

## 2017-05-30 DIAGNOSIS — K21.9 GASTROESOPHAGEAL REFLUX DISEASE WITHOUT ESOPHAGITIS: ICD-10-CM

## 2017-05-30 DIAGNOSIS — K58.9 IRRITABLE BOWEL SYNDROME WITHOUT DIARRHEA: ICD-10-CM

## 2017-05-30 RX ORDER — LISINOPRIL 20 MG/1
TABLET ORAL
Qty: 30 TABLET | Refills: 5 | Status: SHIPPED | OUTPATIENT
Start: 2017-05-30 | End: 2017-06-19 | Stop reason: SDUPTHER

## 2017-05-30 RX ORDER — PANTOPRAZOLE SODIUM 40 MG/1
TABLET, DELAYED RELEASE ORAL
Qty: 30 TABLET | Refills: 5 | Status: SHIPPED | OUTPATIENT
Start: 2017-05-30 | End: 2017-06-19 | Stop reason: SDUPTHER

## 2017-05-30 RX ORDER — DICYCLOMINE HCL 20 MG
TABLET ORAL
Qty: 90 TABLET | Refills: 5 | Status: SHIPPED | OUTPATIENT
Start: 2017-05-30 | End: 2017-10-02

## 2017-05-31 ENCOUNTER — OFFICE VISIT (OUTPATIENT)
Dept: CARDIOLOGY | Facility: CLINIC | Age: 47
End: 2017-05-31

## 2017-05-31 ENCOUNTER — TELEPHONE (OUTPATIENT)
Dept: CARDIOLOGY | Facility: CLINIC | Age: 47
End: 2017-05-31

## 2017-05-31 VITALS
BODY MASS INDEX: 34.21 KG/M2 | HEIGHT: 67 IN | SYSTOLIC BLOOD PRESSURE: 134 MMHG | HEART RATE: 92 BPM | WEIGHT: 218 LBS | OXYGEN SATURATION: 99 % | DIASTOLIC BLOOD PRESSURE: 75 MMHG | RESPIRATION RATE: 16 BRPM

## 2017-05-31 DIAGNOSIS — E78.5 DYSLIPIDEMIA: ICD-10-CM

## 2017-05-31 DIAGNOSIS — I10 ESSENTIAL HYPERTENSION: ICD-10-CM

## 2017-05-31 DIAGNOSIS — R07.9 CHEST PAIN, UNSPECIFIED TYPE: Primary | ICD-10-CM

## 2017-05-31 PROCEDURE — 99213 OFFICE O/P EST LOW 20 MIN: CPT | Performed by: PHYSICIAN ASSISTANT

## 2017-06-08 DIAGNOSIS — M19.90 ARTHRITIS: ICD-10-CM

## 2017-06-08 DIAGNOSIS — F41.9 ANXIETY: Primary | ICD-10-CM

## 2017-06-13 ENCOUNTER — HOSPITAL ENCOUNTER (OUTPATIENT)
Dept: NUCLEAR MEDICINE | Facility: HOSPITAL | Age: 47
Discharge: HOME | End: 2017-06-13
Attending: INTERNAL MEDICINE

## 2017-06-13 ENCOUNTER — HOSPITAL ENCOUNTER (OUTPATIENT)
Dept: CARDIOLOGY | Facility: HOSPITAL | Age: 47
Discharge: HOME | End: 2017-06-13
Attending: INTERNAL MEDICINE

## 2017-06-13 DIAGNOSIS — R07.9 CHEST PAIN, UNSPECIFIED TYPE: ICD-10-CM

## 2017-06-13 LAB
BH CV NUCLEAR PRIOR STUDY: 1
BH CV STRESS BP STAGE 1: NORMAL
BH CV STRESS BP STAGE 2: NORMAL
BH CV STRESS COMMENTS STAGE 1: NORMAL
BH CV STRESS COMMENTS STAGE 2: NORMAL
BH CV STRESS DOSE REGADENOSON STAGE 1: 0.4
BH CV STRESS DURATION MIN STAGE 1: 0
BH CV STRESS DURATION MIN STAGE 2: 4
BH CV STRESS DURATION SEC STAGE 1: 15
BH CV STRESS DURATION SEC STAGE 2: 0
BH CV STRESS HR STAGE 1: 111
BH CV STRESS HR STAGE 2: 72
BH CV STRESS PROTOCOL 1: NORMAL
BH CV STRESS RECOVERY BP: NORMAL MMHG
BH CV STRESS RECOVERY HR: 72 BPM
BH CV STRESS STAGE 1: 1
BH CV STRESS STAGE 2: 2
LV EF NUC BP: 64 %
MAXIMAL PREDICTED HEART RATE: 176 BPM
PERCENT MAX PREDICTED HR: 63.07 %
STRESS BASELINE BP: NORMAL MMHG
STRESS BASELINE HR: 73 BPM
STRESS PERCENT HR: 74 %
STRESS POST PEAK BP: NORMAL MMHG
STRESS POST PEAK HR: 111 BPM
STRESS TARGET HR: 150 BPM

## 2017-06-13 PROCEDURE — 0 TECHNETIUM SESTAMIBI: Performed by: INTERNAL MEDICINE

## 2017-06-13 PROCEDURE — A9500 TC99M SESTAMIBI: HCPCS | Performed by: INTERNAL MEDICINE

## 2017-06-13 PROCEDURE — 93017 CV STRESS TEST TRACING ONLY: CPT

## 2017-06-13 PROCEDURE — 25010000002 AMINOPHYLLINE PER 250 MG: Performed by: INTERNAL MEDICINE

## 2017-06-13 PROCEDURE — 25010000002 REGADENOSON 0.4 MG/5ML SOLUTION: Performed by: INTERNAL MEDICINE

## 2017-06-13 PROCEDURE — 78452 HT MUSCLE IMAGE SPECT MULT: CPT | Performed by: INTERNAL MEDICINE

## 2017-06-13 PROCEDURE — 78451 HT MUSCLE IMAGE SPECT SING: CPT

## 2017-06-13 PROCEDURE — 93018 CV STRESS TEST I&R ONLY: CPT | Performed by: INTERNAL MEDICINE

## 2017-06-13 RX ORDER — AMINOPHYLLINE DIHYDRATE 25 MG/ML
125 INJECTION, SOLUTION INTRAVENOUS
Status: COMPLETED | OUTPATIENT
Start: 2017-06-13 | End: 2017-06-13

## 2017-06-13 RX ADMIN — REGADENOSON 0.4 MG: 0.08 INJECTION, SOLUTION INTRAVENOUS at 09:21

## 2017-06-13 RX ADMIN — Medication 1 DOSE: at 09:21

## 2017-06-13 RX ADMIN — Medication 1 DOSE: at 08:25

## 2017-06-13 RX ADMIN — AMINOPHYLLINE 125 MG: 25 INJECTION, SOLUTION INTRAVENOUS at 09:26

## 2017-06-19 ENCOUNTER — OFFICE VISIT (OUTPATIENT)
Dept: FAMILY MEDICINE CLINIC | Facility: CLINIC | Age: 47
End: 2017-06-19

## 2017-06-19 VITALS
SYSTOLIC BLOOD PRESSURE: 120 MMHG | WEIGHT: 225 LBS | HEIGHT: 67 IN | BODY MASS INDEX: 35.31 KG/M2 | OXYGEN SATURATION: 99 % | TEMPERATURE: 97.9 F | HEART RATE: 75 BPM | DIASTOLIC BLOOD PRESSURE: 75 MMHG

## 2017-06-19 DIAGNOSIS — M54.42 CHRONIC BILATERAL LOW BACK PAIN WITH LEFT-SIDED SCIATICA: Primary | ICD-10-CM

## 2017-06-19 DIAGNOSIS — J45.40 MODERATE PERSISTENT ASTHMA WITHOUT COMPLICATION: ICD-10-CM

## 2017-06-19 DIAGNOSIS — K21.9 GASTROESOPHAGEAL REFLUX DISEASE WITHOUT ESOPHAGITIS: ICD-10-CM

## 2017-06-19 DIAGNOSIS — E78.00 PURE HYPERCHOLESTEROLEMIA: Primary | ICD-10-CM

## 2017-06-19 DIAGNOSIS — M25.561 ACUTE PAIN OF RIGHT KNEE: ICD-10-CM

## 2017-06-19 DIAGNOSIS — G89.29 CHRONIC BILATERAL LOW BACK PAIN WITH LEFT-SIDED SCIATICA: Primary | ICD-10-CM

## 2017-06-19 DIAGNOSIS — G89.29 CHRONIC BILATERAL LOW BACK PAIN WITH LEFT-SIDED SCIATICA: ICD-10-CM

## 2017-06-19 DIAGNOSIS — J45.901 ASTHMA EXACERBATION: ICD-10-CM

## 2017-06-19 DIAGNOSIS — I10 ESSENTIAL HYPERTENSION: ICD-10-CM

## 2017-06-19 DIAGNOSIS — M54.42 CHRONIC BILATERAL LOW BACK PAIN WITH LEFT-SIDED SCIATICA: ICD-10-CM

## 2017-06-19 PROCEDURE — 99214 OFFICE O/P EST MOD 30 MIN: CPT | Performed by: NURSE PRACTITIONER

## 2017-06-19 RX ORDER — PRAVASTATIN SODIUM 40 MG
40 TABLET ORAL NIGHTLY
Qty: 30 TABLET | Refills: 5 | Status: SHIPPED | OUTPATIENT
Start: 2017-06-19 | End: 2017-07-03 | Stop reason: SDUPTHER

## 2017-06-19 RX ORDER — BUDESONIDE AND FORMOTEROL FUMARATE DIHYDRATE 160; 4.5 UG/1; UG/1
2 AEROSOL RESPIRATORY (INHALATION) 2 TIMES DAILY
Qty: 1 INHALER | Refills: 5 | Status: SHIPPED | OUTPATIENT
Start: 2017-06-19 | End: 2017-07-03 | Stop reason: SDUPTHER

## 2017-06-19 RX ORDER — HYDROCODONE BITARTRATE AND ACETAMINOPHEN 7.5; 325 MG/1; MG/1
1 TABLET ORAL EVERY 6 HOURS PRN
Qty: 60 TABLET | Refills: 0 | Status: SHIPPED | OUTPATIENT
Start: 2017-06-19 | End: 2017-07-17 | Stop reason: SDUPTHER

## 2017-06-19 RX ORDER — PANTOPRAZOLE SODIUM 40 MG/1
40 TABLET, DELAYED RELEASE ORAL DAILY
Qty: 30 TABLET | Refills: 5 | Status: SHIPPED | OUTPATIENT
Start: 2017-06-19 | End: 2017-11-13 | Stop reason: SDUPTHER

## 2017-06-19 RX ORDER — RANITIDINE 300 MG/1
300 TABLET ORAL 2 TIMES DAILY
Qty: 60 TABLET | Refills: 5 | Status: SHIPPED | OUTPATIENT
Start: 2017-06-19 | End: 2017-07-03 | Stop reason: SDUPTHER

## 2017-06-19 RX ORDER — LISINOPRIL 20 MG/1
20 TABLET ORAL DAILY
Qty: 30 TABLET | Refills: 5 | Status: SHIPPED | OUTPATIENT
Start: 2017-06-19 | End: 2017-11-13 | Stop reason: SDUPTHER

## 2017-06-19 NOTE — PROGRESS NOTES
Subjective   Jaquan Mckay is a 46 y.o. male.     Chief Complaint   Patient presents with   • Back Pain   • Knee Pain       History of Present Illness     Left knee pain-reports it is locking.  He reports it has been painful to try to stretch.  He is using Icy hot.  He reports he would like a new referral to ortho for his knee pain.  Not at goal.   Foot pain-bottom of feet and up achilles tendon.  He reports this is bilaterally.  He reports in the AM it is too painful to put his feet down onto the floor.  He is using ACE support on the left.  He has tried new shoes.  He is concerned it might be plantar fasciitis.  Left foot is the worse.  He did have a negative left foot xray last month.    Back Pain-reports some improvement in his back symptoms.  He reports the pain is less in intensity and duration.  He reports he continues to have some left hip pain and side discomfort.  He is haivng less numbness in his legs.  He has been to speciality and is not a candidate for surgical intervention.  Ongoing.      The following portions of the patient's history were reviewed and updated as appropriate: allergies, current medications, past family history, past medical history, past social history, past surgical history and problem list.    Review of Systems   Constitutional: Positive for appetite change (increase) and fatigue. Negative for fever.   HENT: Negative for congestion, ear pain, postnasal drip, rhinorrhea, sinus pressure and sore throat.    Eyes: Negative for pain and visual disturbance (wears glasses.  recent eye exam with new glasses).   Respiratory: Positive for shortness of breath. Negative for cough and chest tightness.    Cardiovascular: Negative for chest pain and palpitations.   Gastrointestinal: Negative for abdominal pain, diarrhea and vomiting.   Genitourinary: Positive for dysuria (chronic) and testicular pain (chronic). Negative for hematuria and urgency.   Musculoskeletal: Positive for arthralgias, back  "pain (with radiation to BLE \"down to ankles\" ) and myalgias.   Neurological: Positive for weakness (BLE), numbness (and tingling with \"electricity\" sensation in his legs) and headaches (intermittent.  Reports \"3 last week\" but did not sleep). Negative for dizziness.   Psychiatric/Behavioral: Positive for sleep disturbance (due to discomfort). Negative for dysphoric mood and suicidal ideas. The patient is not nervous/anxious.    All other systems reviewed and are negative.      Objective     /75 (BP Location: Left arm, Patient Position: Sitting)  Pulse 75  Temp 97.9 °F (36.6 °C) (Oral)   Ht 67\" (170.2 cm)  Wt 225 lb (102 kg)  SpO2 99%  BMI 35.24 kg/m2    Physical Exam   Constitutional: He is oriented to person, place, and time. He appears well-developed and well-nourished.   HENT:   Head: Normocephalic and atraumatic.   Right Ear: Tympanic membrane, external ear and ear canal normal.   Left Ear: Tympanic membrane, external ear and ear canal normal.   Nose: No mucosal edema or rhinorrhea.   Mouth/Throat: Oropharynx is clear and moist. No oropharyngeal exudate or posterior oropharyngeal erythema.   Eyes: Conjunctivae and EOM are normal. Pupils are equal, round, and reactive to light. No scleral icterus.   Neck: Normal range of motion. Neck supple. No JVD present. No thyromegaly present.   Cardiovascular: Normal rate, regular rhythm and normal heart sounds.    No murmur heard.  Pulmonary/Chest: Effort normal. No respiratory distress. He has wheezes (scattered diffuse). He exhibits no tenderness.   Abdominal: Soft. Bowel sounds are normal. He exhibits no mass. There is no tenderness.   Musculoskeletal: He exhibits no edema.        Right elbow: Tenderness found. Medial epicondyle tenderness noted.        Left knee: He exhibits decreased range of motion and swelling (mild). He exhibits no ecchymosis. Tenderness found. Medial joint line and lateral joint line tenderness noted.        Lumbar back: He exhibits " decreased range of motion, tenderness, pain and spasm. He exhibits no swelling.   Gait shuffling with limping noted.  More prominent on right side from behind.  Frequent position changes for sit to stand, leaning on wall and over exam table.      Skin Integrity  -  His right foot skin is intact.     Jaquan 's left foot skin is intact. .  Lymphadenopathy:        Head (right side): No submandibular adenopathy present.        Head (left side): No submandibular adenopathy present.     He has no cervical adenopathy.   Neurological: He is alert and oriented to person, place, and time. He has normal reflexes. No cranial nerve deficit. He exhibits normal muscle tone. Coordination normal.   Skin: Skin is warm and dry.   Psychiatric: His speech is normal and behavior is normal. Judgment and thought content normal. His mood appears not anxious. He is not actively hallucinating. Cognition and memory are normal. He exhibits a depressed mood. He expresses no homicidal and no suicidal ideation. He exhibits normal recent memory and normal remote memory. He is attentive.   Vitals reviewed.    Assessment/Plan     Problem List Items Addressed This Visit        Cardiovascular and Mediastinum    Hypertension    Relevant Medications    lisinopril (PRINIVIL,ZESTRIL) 20 MG tablet    Hyperlipidemia - Primary    Relevant Medications    pravastatin (PRAVACHOL) 40 MG tablet       Digestive    GERD (gastroesophageal reflux disease)    Relevant Medications    raNITIdine (ZANTAC) 300 MG tablet    pantoprazole (PROTONIX) 40 MG EC tablet       Other    Chronic bilateral low back pain with left-sided sciatica    Relevant Medications    HYDROcodone-acetaminophen (NORCO) 7.5-325 MG per tablet      Other Visit Diagnoses     Asthma exacerbation        Relevant Medications    budesonide-formoterol (SYMBICORT) 160-4.5 MCG/ACT inhaler    Moderate persistent asthma without complication        Relevant Medications    budesonide-formoterol (SYMBICORT) 160-4.5  MCG/ACT inhaler    Acute pain of right knee        Relevant Orders    Ambulatory Referral to Orthopedic Surgery (Completed)        Referral as above to speciality.  Refill on routine maintenance meds today.   CHAVEZ reviewed today and consistent.  Will refill prescribed controlled medication today.  Patient is aware they cannot receive narcotics from any other provider.  Risk and benefits of medication use has been reviewed.  History and physical exam exhibit continued safe and appropriate use of controlled substances.  Understands disease processes and need for medications.  Understands reasons for urgent and emergent care.  Patient (& family) verbalized agreement for treatment plan.   PRN ICE/Heat and muscle rubs for knee pain.   ACE wrap for support if desired.  May use knee braces already has at home.   RTC 1 month, sooner if needed.        This document has been electronically signed by:  BALDOMERO Thao, NESHAP-C

## 2017-06-21 PROCEDURE — 99283 EMERGENCY DEPT VISIT LOW MDM: CPT

## 2017-06-22 ENCOUNTER — HOSPITAL ENCOUNTER (EMERGENCY)
Facility: HOSPITAL | Age: 47
Discharge: HOME | End: 2017-06-22
Attending: EMERGENCY MEDICINE | Admitting: EMERGENCY MEDICINE

## 2017-06-22 ENCOUNTER — APPOINTMENT (OUTPATIENT)
Dept: GENERAL RADIOLOGY | Facility: HOSPITAL | Age: 47
End: 2017-06-22

## 2017-06-22 VITALS
HEIGHT: 67 IN | TEMPERATURE: 97.8 F | OXYGEN SATURATION: 98 % | SYSTOLIC BLOOD PRESSURE: 122 MMHG | WEIGHT: 225 LBS | BODY MASS INDEX: 35.31 KG/M2 | RESPIRATION RATE: 18 BRPM | DIASTOLIC BLOOD PRESSURE: 76 MMHG | HEART RATE: 76 BPM

## 2017-06-22 DIAGNOSIS — G89.29 CHRONIC PAIN OF LEFT KNEE: Primary | ICD-10-CM

## 2017-06-22 DIAGNOSIS — M25.562 CHRONIC PAIN OF LEFT KNEE: Primary | ICD-10-CM

## 2017-06-22 PROCEDURE — 73562 X-RAY EXAM OF KNEE 3: CPT

## 2017-06-22 PROCEDURE — 73562 X-RAY EXAM OF KNEE 3: CPT | Performed by: RADIOLOGY

## 2017-06-22 RX ORDER — ACETAMINOPHEN 325 MG/1
1000 TABLET ORAL ONCE
Status: COMPLETED | OUTPATIENT
Start: 2017-06-22 | End: 2017-06-22

## 2017-06-22 RX ADMIN — ACETAMINOPHEN 975 MG: 325 TABLET ORAL at 01:05

## 2017-06-22 NOTE — ED PROVIDER NOTES
Subjective   Patient is a 46 y.o. male presenting with lower extremity pain.   History provided by:  Patient  Lower Extremity Issue   Location:  Knee  Time since incident:  3 weeks  Injury: yes    Knee location:  L knee  Pain details:     Quality:  Aching    Severity:  Moderate    Onset quality:  Gradual    Timing:  Constant    Progression:  Worsening  Chronicity:  Recurrent  Worsened by:  Bearing weight and extension  Associated symptoms: decreased ROM    Associated symptoms: no fever        Review of Systems   Constitutional: Negative.  Negative for fever.   HENT: Negative.    Respiratory: Negative.    Cardiovascular: Negative.  Negative for chest pain.   Gastrointestinal: Negative.  Negative for abdominal pain.   Endocrine: Negative.    Genitourinary: Negative.  Negative for dysuria.   Skin: Negative.    Neurological: Negative.    Psychiatric/Behavioral: Negative.    All other systems reviewed and are negative.      Past Medical History:   Diagnosis Date   • Allergic    • Anxiety    • Arthritis    • Asthma    • Clotting disorder 2004    had a knee surgery   • Depression    • Gastric ulcer    • GERD (gastroesophageal reflux disease)    • H/O migraine    • H/O sleep apnea    • Headache    • Heart attack    • History of epilepsy    • History of seizures    • Hyperlipidemia    • Hypertension    • Knee pain, acute     Left   • Low back pain    • Migraine    • Obesity    • Carl Mountain spotted fever    • Seizures        Allergies   Allergen Reactions   • Paxil [Paroxetine Hcl] Shortness Of Breath     Chest pain    • Isosorbide Nitrate Rash     Rash, hives, had to use inhaler.    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       Family History   Problem Relation Age of Onset   • Diabetes Mother    • Hypertension Mother    • Stroke Mother    • Diabetes Father    • Skin cancer Father    • Hypertension Father    • Heart attack Father    • Diabetes Brother    • Hypertension Brother    • Heart disease Maternal Aunt    • Heart disease Maternal Uncle    • Heart disease Paternal Aunt    • Heart disease Paternal Uncle    • Heart disease Maternal Grandmother    • Heart disease Maternal Grandfather    • Heart disease Paternal Grandmother    • Heart disease Paternal Grandfather        Social History     Social History   • Marital status:      Spouse name: N/A   • Number of children: 2   • Years of education: 12     Occupational History   • DISABLED      Social History Main Topics   • Smoking status: Current Every Day Smoker     Packs/day: 2.00     Types: Cigars, Cigarettes     Start date: 5/5/2010   • Smokeless tobacco: Never Used   • Alcohol use No   • Drug use: No   • Sexual activity: Defer     Other Topics Concern   • None     Social History Narrative           Objective   Physical Exam   Constitutional: He is oriented to person, place, and time. He appears well-developed and well-nourished. No distress.   HENT:   Head: Normocephalic and atraumatic.   Nose: Nose normal.   Eyes: Conjunctivae and EOM are normal. Pupils are equal, round, and reactive to light.   Neck: Normal range of motion. Neck supple. No JVD present. No tracheal deviation present.   Cardiovascular: Normal rate, regular rhythm and normal heart sounds.    No murmur heard.  Pulmonary/Chest: Effort normal and breath sounds normal. No respiratory distress. He has no wheezes.   Abdominal: Soft. Bowel sounds are normal. There is no tenderness.   Musculoskeletal: He exhibits tenderness. He exhibits no edema or  deformity.   Decreased ROM of left knee, tender to palpation.    Neurological: He is alert and oriented to person, place, and time. No cranial nerve deficit.   Skin: Skin is warm and dry. No rash noted. He is not diaphoretic. No erythema. No pallor.   Psychiatric: He has a normal mood and affect. His behavior is normal. Thought content normal.   Nursing note and vitals reviewed.      Procedures         ED Course  ED Course   Comment By Time   XR knee reviewed by Dr. Paetl:  Negative for apparent acute bony abnormality. GREG Dow 06/22 0144                  MDM  Number of Diagnoses or Management Options  Chronic pain of left knee: new and requires workup     Amount and/or Complexity of Data Reviewed  Tests in the radiology section of CPT®: ordered and reviewed    Risk of Complications, Morbidity, and/or Mortality  Presenting problems: low  Diagnostic procedures: low  Management options: low    Patient Progress  Patient progress: stable      Final diagnoses:   Chronic pain of left knee            GREG Dow  06/22/17 0143       GREG Dow  06/22/17 0144

## 2017-07-03 DIAGNOSIS — E78.00 PURE HYPERCHOLESTEROLEMIA: ICD-10-CM

## 2017-07-03 DIAGNOSIS — J45.40 MODERATE PERSISTENT ASTHMA WITHOUT COMPLICATION: ICD-10-CM

## 2017-07-03 DIAGNOSIS — K21.9 GASTROESOPHAGEAL REFLUX DISEASE WITHOUT ESOPHAGITIS: ICD-10-CM

## 2017-07-03 RX ORDER — BUDESONIDE AND FORMOTEROL FUMARATE DIHYDRATE 160; 4.5 UG/1; UG/1
AEROSOL RESPIRATORY (INHALATION)
Qty: 10.2 G | Refills: 5 | Status: SHIPPED | OUTPATIENT
Start: 2017-07-03 | End: 2018-03-07 | Stop reason: SDUPTHER

## 2017-07-03 RX ORDER — PRAVASTATIN SODIUM 40 MG
TABLET ORAL
Qty: 30 TABLET | Refills: 5 | Status: SHIPPED | OUTPATIENT
Start: 2017-07-03 | End: 2018-01-08 | Stop reason: SDUPTHER

## 2017-07-03 RX ORDER — RANITIDINE 300 MG/1
TABLET ORAL
Qty: 60 TABLET | Refills: 5 | Status: SHIPPED | OUTPATIENT
Start: 2017-07-03 | End: 2018-02-07 | Stop reason: SDUPTHER

## 2017-07-05 DIAGNOSIS — M25.562 LEFT KNEE PAIN, UNSPECIFIED CHRONICITY: Primary | ICD-10-CM

## 2017-07-06 ENCOUNTER — OFFICE VISIT (OUTPATIENT)
Dept: ORTHOPEDIC SURGERY | Facility: CLINIC | Age: 47
End: 2017-07-06

## 2017-07-06 VITALS — RESPIRATION RATE: 16 BRPM | BODY MASS INDEX: 36.08 KG/M2 | HEIGHT: 67 IN | WEIGHT: 229.9 LBS

## 2017-07-06 DIAGNOSIS — M94.262 CHONDROMALACIA, KNEE, LEFT: Primary | ICD-10-CM

## 2017-07-06 PROCEDURE — 20610 DRAIN/INJ JOINT/BURSA W/O US: CPT | Performed by: ORTHOPAEDIC SURGERY

## 2017-07-06 PROCEDURE — 99203 OFFICE O/P NEW LOW 30 MIN: CPT | Performed by: ORTHOPAEDIC SURGERY

## 2017-07-06 RX ORDER — LIDOCAINE HYDROCHLORIDE 10 MG/ML
2 INJECTION, SOLUTION INFILTRATION; PERINEURAL
Status: COMPLETED | OUTPATIENT
Start: 2017-07-06 | End: 2017-07-06

## 2017-07-06 RX ORDER — METHYLPREDNISOLONE ACETATE 40 MG/ML
40 INJECTION, SUSPENSION INTRA-ARTICULAR; INTRALESIONAL; INTRAMUSCULAR; SOFT TISSUE
Status: COMPLETED | OUTPATIENT
Start: 2017-07-06 | End: 2017-07-06

## 2017-07-06 RX ADMIN — LIDOCAINE HYDROCHLORIDE 2 ML: 10 INJECTION, SOLUTION INFILTRATION; PERINEURAL at 14:11

## 2017-07-06 RX ADMIN — METHYLPREDNISOLONE ACETATE 40 MG: 40 INJECTION, SUSPENSION INTRA-ARTICULAR; INTRALESIONAL; INTRAMUSCULAR; SOFT TISSUE at 14:11

## 2017-07-06 NOTE — PROGRESS NOTES
Subjective   Patient ID: Jaquan Mckay is a 46 y.o. male  Pain, Edema, and Consult of the Left Knee (Patient is here today to be seen at the request of BALDOMERO Miller. Patient sustained injury to L knee sometime in 2016, tripped over chair while attempting to go to bathroom. Patient has been experiencing pain and swelling.)             History of Present Illness    Denies history of acute trauma in the last year but does state the pain is worse squatting bending twisting in front of his knee.  Occasionally it swells.  Still some relief taking Motrin.  Has been through physical therapy with minimal improvement.  He has somewhat condition his right knee several years ago treated with arthroscopy and feels much better thinks he may need the same thing on the left knee.  Had an MRI last year but does not recall where it was done nor have images for my review today of his left knee MRI.    Review of Systems   Constitutional: Negative for diaphoresis, fever and unexpected weight change.   HENT: Negative for dental problem and sore throat.    Eyes: Negative for visual disturbance.   Respiratory: Negative for shortness of breath.    Cardiovascular: Negative for chest pain.   Gastrointestinal: Negative for abdominal pain, constipation, diarrhea, nausea and vomiting.   Genitourinary: Negative for difficulty urinating and frequency.   Musculoskeletal: Positive for arthralgias.   Neurological: Negative for headaches.   Hematological: Does not bruise/bleed easily.   All other systems reviewed and are negative.      Past Medical History:   Diagnosis Date   • Allergic    • Anxiety    • Arthritis    • Asthma    • Clotting disorder 2004    had a knee surgery   • Depression    • Gastric ulcer    • GERD (gastroesophageal reflux disease)    • H/O migraine    • H/O sleep apnea    • Headache    • Heart attack    • History of epilepsy    • History of seizures    • Hyperlipidemia    • Hypertension    • Knee pain, acute     Left   • Low  "back pain    • Migraine    • Obesity    • Carl [place] spotted fever    • Seizures         Past Surgical History:   Procedure Laterality Date   • BACK SURGERY     • BRAIN SURGERY  1986    Tumor removal    • CHOLECYSTECTOMY     • KNEE SURGERY Right    • TUMOR EXCISION      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       Family History   Problem Relation Age of Onset   • Diabetes Mother    • Hypertension Mother    • Stroke Mother    • Diabetes Father    • Skin cancer Father    • Hypertension Father    • Heart attack Father    • Diabetes Brother    • Hypertension Brother    • Heart disease Maternal Aunt    • Heart disease Maternal Uncle    • Heart disease Paternal Aunt    • Heart disease Paternal Uncle    • Heart disease Maternal Grandmother    • Heart disease Maternal Grandfather    • Heart disease Paternal Grandmother    • Heart disease Paternal Grandfather        Social History     Social History   • Marital status:      Spouse name: N/A   • Number of children: 2   • Years of education: 12     Occupational History   • DISABLED      Social History Main Topics   • Smoking status: Current Every Day Smoker     Packs/day: 2.00     Types: Cigars, Cigarettes     Start date: 5/5/2010   • Smokeless tobacco: Never Used   • Alcohol use No   • Drug use: No   • Sexual activity: Defer     Other Topics Concern   • Not on file     Social History Narrative       Allergies   Allergen Reactions   • Paxil [Paroxetine Hcl] Shortness Of Breath     Chest pain    • Isosorbide Nitrate Rash     Rash, hives, had to use inhaler.    • Ciprofloxacin    • Contrast Dye    • Mobic [Meloxicam]    • Penicillins    • Prednisone    • Robitussin Cough+ Chest Max St  [Dextromethorphan-Guaifenesin]    • Sulfa Antibiotics    • Zoloft [Sertraline Hcl] Hives and Itching   • Keflex [Cephalexin] Rash   • Metoprolol Rash       Objective   Resp 16  Ht 67\" (170.2 cm)  Wt 229 lb 14.4 oz (104 kg)  BMI 36.01 kg/m2   Physical Exam  Constitutional: He " is oriented to person, place, and time. He appears well-developed and well-nourished.   HENT:   Head: Normocephalic and atraumatic.   Eyes: EOM are normal. Pupils are equal, round, and reactive to light.   Neck: Normal range of motion. Neck supple.   Cardiovascular: Normal rate.    Pulmonary/Chest: Effort normal and breath sounds normal.   Abdominal: Soft.   Neurological: He is alert and oriented to person, place, and time.   Skin: Skin is warm and dry.   Psychiatric: He has a normal mood and affect.   Nursing note and vitals reviewed.       Ortho Exam     Left knee with positive patella femoral crepitus, positive patellofemoral apprehension sign, full range of motion, no effusion, skin appears normal, normal ligament exam with negative Lockman sign, negative Tom sign, calf supple neurovascularly intact negative straight leg raising.    Assessment/Plan   Review of Radiographic Studies:    Radiographic images today of affected area I personally viewed and showed no sign of acute fracture or dislocation.      Large Joint Arthrocentesis  Date/Time: 7/6/2017 2:11 PM  Consent given by: patient  Site marked: site marked  Timeout: Immediately prior to procedure a time out was called to verify the correct patient, procedure, equipment, support staff and site/side marked as required   Supporting Documentation  Indications: pain   Procedure Details  Location: knee - L knee  Preparation: Patient was prepped and draped in the usual sterile fashion  Needle size: 22 G  Medications administered: 40 mg methylPREDNISolone acetate 40 MG/ML; 2 mL lidocaine 1 %  Patient tolerance: patient tolerated the procedure well with no immediate complications           Jaquan was seen today for pain, edema and consult.    Diagnoses and all orders for this visit:    Chondromalacia, knee, left  -     Ambulatory Referral to Physical Therapy Evaluate and treat     Physical therapy referral given      Recommendations/Plan:   Work/Activity Status:  May perform usual activities as tolerated    Patient agreeable to call or return sooner for any concerns.         Patient advised to bring images of his left knee MR with him for next visit to review    Impression:  Left knee anterior knee pain chondromalacia patella  Plan:  Recheck 2 months if still painful review MRI and discuss arthroscopic treatment that point

## 2017-07-10 ENCOUNTER — OFFICE VISIT (OUTPATIENT)
Dept: CARDIOLOGY | Facility: CLINIC | Age: 47
End: 2017-07-10

## 2017-07-10 VITALS
BODY MASS INDEX: 35.79 KG/M2 | SYSTOLIC BLOOD PRESSURE: 127 MMHG | HEIGHT: 67 IN | RESPIRATION RATE: 16 BRPM | HEART RATE: 73 BPM | DIASTOLIC BLOOD PRESSURE: 71 MMHG | WEIGHT: 228 LBS

## 2017-07-10 DIAGNOSIS — E78.2 MIXED HYPERLIPIDEMIA: ICD-10-CM

## 2017-07-10 DIAGNOSIS — I10 ESSENTIAL HYPERTENSION: ICD-10-CM

## 2017-07-10 DIAGNOSIS — G89.29 CHRONIC BILATERAL LOW BACK PAIN WITH LEFT-SIDED SCIATICA: ICD-10-CM

## 2017-07-10 DIAGNOSIS — R07.9 CHEST PAIN, UNSPECIFIED TYPE: Primary | ICD-10-CM

## 2017-07-10 DIAGNOSIS — K21.9 GASTROESOPHAGEAL REFLUX DISEASE WITHOUT ESOPHAGITIS: ICD-10-CM

## 2017-07-10 DIAGNOSIS — M54.42 CHRONIC BILATERAL LOW BACK PAIN WITH LEFT-SIDED SCIATICA: ICD-10-CM

## 2017-07-10 PROCEDURE — 99213 OFFICE O/P EST LOW 20 MIN: CPT | Performed by: INTERNAL MEDICINE

## 2017-07-10 NOTE — PROGRESS NOTES
BALDOMERO Thao  Jaquan Mckay  1970  07/10/2017    Patient Active Problem List   Diagnosis   • GERD (gastroesophageal reflux disease)   • Arthritis   • Hypertension   • Seizure disorder   • Hyperlipidemia   • Anxiety   • Varicocele   • Varicocele present on ultrasound of scrotum   • Obesity (BMI 30.0-34.9)   • Chronic bilateral low back pain with left-sided sciatica   • Seasonal allergic rhinitis due to pollen   • Mild intermittent asthma without complication   • Chest pain   • Dysuria       Dear BALDOMERO Thao:    Subjective     Jaquan Mckay is a 46 y.o. male with the problems as listed above, presents      History of Present Illness:Mr. Mckay is a 46-year-old  male with history of chronic intermittent chest pains for which she underwent initially a treadmill stress EKG test when he walked for 9-1/2 minutes on a standard Barney protocol with no echocardiographic evidence of myocardial ischemia.  However since he did not achieve the target heart rate, he underwent a Lexiscan sestamibi study recently in June 2017 that revealed a small, mild degree of inferolateral myocardial ischemia.  He is here today for regular cardiac follow-up.  He says his chest pains are better.  He has dyspnea with mild exertion with no PND or orthopnea.  He has intermittent bilateral leg edema.  He has history of smoking for 5 years and continued to smoke about a pack in a week.  Previously used to smoke about pack and half a day.        Allergies   Allergen Reactions   • Paxil [Paroxetine Hcl] Shortness Of Breath     Chest pain    • Isosorbide Nitrate Rash     Rash, hives, had to use inhaler.    • Ciprofloxacin    • Contrast Dye    • Mobic [Meloxicam]    • Penicillins    • Prednisone    • Robitussin Cough+ Chest Max St  [Dextromethorphan-Guaifenesin]    • Sulfa Antibiotics    • Zoloft [Sertraline Hcl] Hives and Itching   • Keflex [Cephalexin] Rash   • Metoprolol Rash   :      Current Outpatient Prescriptions:   •   albuterol (PROVENTIL HFA;VENTOLIN HFA) 108 (90 BASE) MCG/ACT inhaler, Inhale 2 puffs Every 4 (Four) Hours As Needed for Shortness of Air., Disp: 1 inhaler, Rfl: 0  •  aspirin 81 MG tablet, Take 1 tablet by mouth Daily., Disp: 30 tablet, Rfl: 11  •  dicyclomine (BENTYL) 20 MG tablet, TAKE 1 TABLET BY MOUTH THREE TIMES DAILY FOR STOMACH, Disp: 90 tablet, Rfl: 5  •  DILANTIN 100 MG ER capsule, TAKE 2 CAPSULES BY MOUTH EVERY MORNING AND 3 CAPSULES BY MOUTH EVERYEVENING, Disp: 150 capsule, Rfl: 5  •  docusate calcium (SURFAK) 240 MG capsule, Take 1 capsule by mouth 2 (Two) Times a Day., Disp: 60 capsule, Rfl: 0  •  HYDROcodone-acetaminophen (NORCO) 7.5-325 MG per tablet, Take 1 tablet by mouth Every 6 (Six) Hours As Needed for Moderate Pain (4-6)., Disp: 60 tablet, Rfl: 0  •  lisinopril (PRINIVIL,ZESTRIL) 20 MG tablet, Take 1 tablet by mouth Daily., Disp: 30 tablet, Rfl: 5  •  loratadine (CLARITIN) 10 MG tablet, Take 1 tablet by mouth Daily As Needed for Allergies., Disp: 30 tablet, Rfl: 5  •  methocarbamol (ROBAXIN) 750 MG tablet, Take 1 tablet by mouth 3 (Three) Times a Day As Needed for muscle spasms., Disp: 90 tablet, Rfl: 5  •  metoclopramide (REGLAN) 10 MG tablet, TAKE ONE TABLET BY MOUTH TWICE A DAY BEFORE MEALS., Disp: 60 tablet, Rfl: 5  •  montelukast (SINGULAIR) 10 MG tablet, Take 1 tablet by mouth Every Night., Disp: 30 tablet, Rfl: 5  •  pantoprazole (PROTONIX) 40 MG EC tablet, Take 1 tablet by mouth Daily., Disp: 30 tablet, Rfl: 5  •  pravastatin (PRAVACHOL) 40 MG tablet, TAKE 1 TABLET BY MOUTH AT BEDTIME TO LOWER CHOLESTROL, Disp: 30 tablet, Rfl: 5  •  promethazine (PHENERGAN) 25 MG suppository, Insert 1 suppository into the rectum Every 6 (Six) Hours As Needed for Nausea or Vomiting., Disp: 12 suppository, Rfl: 0  •  raNITIdine (ZANTAC) 300 MG tablet, TAKE 1 TABLET BY MOUTH TWICE DAILY FOR STOMACH, Disp: 60 tablet, Rfl: 5  •  sodium chloride (OCEAN) 0.65 % nasal spray, 2 sprays into each nostril As Needed  "for congestion., Disp: 30 mL, Rfl: 12  •  solifenacin (VESICARE) 5 MG tablet, Take 1 tablet by mouth daily., Disp: , Rfl:   •  SYMBICORT 160-4.5 MCG/ACT inhaler, INHALE TWO PUFFS TWICE DAILY, Disp: 10.2 g, Rfl: 5  •  nitroglycerin (NITROSTAT) 0.4 MG SL tablet, 1 under the tongue as needed for angina, may repeat q5mins for up three doses, Disp: 25 tablet, Rfl: 3      The following portions of the patient's history were reviewed and updated as appropriate: allergies, current medications, past family history, past medical history, past social history, past surgical history and problem list.    Social History   Substance Use Topics   • Smoking status: Current Every Day Smoker     Packs/day: 2.00     Types: Cigars, Cigarettes     Start date: 5/5/2010   • Smokeless tobacco: Never Used   • Alcohol use No       Review of Systems   Constitution: Negative for chills and fever.   HENT: Negative for headaches, nosebleeds and sore throat.    Cardiovascular: Positive for leg swelling and palpitations.   Respiratory: Positive for shortness of breath. Negative for cough, hemoptysis and wheezing.    Gastrointestinal: Negative for abdominal pain, hematemesis, hematochezia, melena, nausea and vomiting.   Genitourinary: Negative for dysuria and hematuria.   Neurological: Positive for dizziness.       Objective   Vitals:    07/10/17 1332   BP: 127/71   Pulse: 73   Resp: 16   Weight: 228 lb (103 kg)   Height: 67\" (170.2 cm)     Body mass index is 35.71 kg/(m^2).        Physical Exam   Constitutional: He is oriented to person, place, and time. He appears well-developed and well-nourished.   HENT:   Head: Normocephalic.   Eyes: Conjunctivae and EOM are normal.   Neck: Normal range of motion. Neck supple. No JVD present. No tracheal deviation present. No thyromegaly present.   Cardiovascular: Normal rate and regular rhythm.  Exam reveals no gallop and no friction rub.    No murmur heard.  Pulmonary/Chest: Breath sounds normal. No " respiratory distress. He has no wheezes. He has no rales.   Abdominal: Soft. Bowel sounds are normal. He exhibits no mass. There is no tenderness.   Musculoskeletal: He exhibits no edema.   Neurological: He is alert and oriented to person, place, and time. No cranial nerve deficit.   Skin: Skin is warm and dry.   Psychiatric: He has a normal mood and affect.       Lab Results   Component Value Date     05/24/2017    K 4.3 05/24/2017     05/24/2017    CO2 29.6 05/24/2017    BUN 13 05/24/2017    CREATININE 0.92 05/24/2017    GLUCOSE 99 05/24/2017    CALCIUM 9.3 05/24/2017    AST 24 05/24/2017    ALT 23 05/24/2017    ALKPHOS 76 05/24/2017    LABIL2 1.0 (L) 05/24/2017     Lab Results   Component Value Date    CKTOTAL 134 05/13/2017     Lab Results   Component Value Date    WBC 5.85 05/24/2017    HGB 16.0 05/24/2017    HCT 46.1 05/24/2017     05/24/2017     Lab Results   Component Value Date    INR 0.99 12/20/2016    INR 1.00 11/07/2016    INR 0.96 05/29/2016     Lab Results   Component Value Date    MG 2.0 08/13/2015     Lab Results   Component Value Date    TSH 1.974 05/24/2017    PSA 0.59 12/09/2014    CHLPL 232 (H) 04/05/2016    TRIG 90 05/24/2017    HDL 66 05/24/2017     (H) 04/05/2016      Lab Results   Component Value Date    BNP 12 02/26/2015     Echo           Procedures    Assessment/Plan      1. Chest pain, improved.    2. Essential hypertension,well controlled     3. Mixed hyperlipidemia     4. Gastroesophageal reflux disease without esophagitis     5. Chronic bilateral low back pain with left-sided sciatica         Recommendations:    1. I reviewed the results of his nuclear stress test with the patient.  2. Since he is doing better on the medical therapy, and since the area of ischemia small and mild, we'll continue to treat him medically and continue to emphasize on risk factor modification, especially to quit smoking.  3. Continue with current medications  4. Follow up in 4-6  months.       Return in about 6 months (around 1/10/2018) for or sooner if needed.    As always, I appreciate very much the opportunity to participate in the cardiovascular care of your patients.      With Best Regards,    Leandro Daily MD, [facility]    Dragon disclaimer:  Much of this encounter note is an electronic transcription/translation of spoken language to printed text. The electronic translation of spoken language may permit erroneous, or at times, nonsensical words or phrases to be inadvertently transcribed; Although I have reviewed the note for such errors, some may still exist.

## 2017-07-17 ENCOUNTER — OFFICE VISIT (OUTPATIENT)
Dept: FAMILY MEDICINE CLINIC | Facility: CLINIC | Age: 47
End: 2017-07-17

## 2017-07-17 VITALS
WEIGHT: 228 LBS | SYSTOLIC BLOOD PRESSURE: 134 MMHG | HEIGHT: 67 IN | TEMPERATURE: 98.6 F | BODY MASS INDEX: 35.79 KG/M2 | OXYGEN SATURATION: 98 % | DIASTOLIC BLOOD PRESSURE: 84 MMHG | HEART RATE: 85 BPM

## 2017-07-17 DIAGNOSIS — G89.29 CHRONIC BILATERAL LOW BACK PAIN WITH LEFT-SIDED SCIATICA: ICD-10-CM

## 2017-07-17 DIAGNOSIS — M54.42 CHRONIC BILATERAL LOW BACK PAIN WITH LEFT-SIDED SCIATICA: ICD-10-CM

## 2017-07-17 DIAGNOSIS — J01.30 ACUTE NON-RECURRENT SPHENOIDAL SINUSITIS: Primary | ICD-10-CM

## 2017-07-17 DIAGNOSIS — J45.901 ASTHMA EXACERBATION: ICD-10-CM

## 2017-07-17 PROBLEM — Q24.5 CONGENITAL CORONARY ARTERY ANOMALY: Status: ACTIVE | Noted: 2017-07-17

## 2017-07-17 PROCEDURE — 99214 OFFICE O/P EST MOD 30 MIN: CPT | Performed by: NURSE PRACTITIONER

## 2017-07-17 RX ORDER — HYDROCODONE BITARTRATE AND ACETAMINOPHEN 7.5; 325 MG/1; MG/1
1 TABLET ORAL 2 TIMES DAILY PRN
Qty: 60 TABLET | Refills: 0 | Status: SHIPPED | OUTPATIENT
Start: 2017-07-17 | End: 2017-08-15 | Stop reason: SDUPTHER

## 2017-07-17 RX ORDER — CLARITHROMYCIN 500 MG/1
500 TABLET, COATED ORAL 2 TIMES DAILY
Qty: 20 TABLET | Refills: 0 | Status: SHIPPED | OUTPATIENT
Start: 2017-07-17 | End: 2017-07-27

## 2017-07-17 RX ORDER — GABAPENTIN 300 MG/1
1 CAPSULE ORAL 3 TIMES DAILY
Refills: 0 | COMMUNITY
Start: 2017-07-13 | End: 2017-08-15

## 2017-07-17 RX ORDER — ALBUTEROL SULFATE 90 UG/1
2 AEROSOL, METERED RESPIRATORY (INHALATION) EVERY 4 HOURS PRN
Qty: 1 INHALER | Refills: 12 | Status: SHIPPED | OUTPATIENT
Start: 2017-07-17 | End: 2018-06-01 | Stop reason: SDUPTHER

## 2017-07-17 RX ORDER — HYDROCODONE BITARTRATE AND ACETAMINOPHEN 7.5; 325 MG/1; MG/1
1 TABLET ORAL EVERY 6 HOURS PRN
Qty: 60 TABLET | Refills: 0 | Status: SHIPPED | OUTPATIENT
Start: 2017-07-17 | End: 2017-07-17 | Stop reason: SDUPTHER

## 2017-07-17 RX ORDER — AZELASTINE 1 MG/ML
1 SPRAY, METERED NASAL 2 TIMES DAILY
Qty: 1 EACH | Refills: 5 | Status: SHIPPED | OUTPATIENT
Start: 2017-07-17 | End: 2017-11-24 | Stop reason: SDUPTHER

## 2017-07-17 NOTE — PROGRESS NOTES
"Subjective   Jaquan Mckay is a 46 y.o. male.     Chief Complaint   Patient presents with   • Follow-up       History of Present Illness     Hospital Follow up-for chest discomfort and then pain.  He also reports a vise  headache.  He was seen by Dr Ng while in [facility].  He has not began the medication due to fear of side effects.  He has to follow up with Neuro in approx 2 weeks.  He reports headaches have been daily and difficult to control.  He does have history of brain tumor and post seizures.    Spenoid sinus opacification-noted on CT of skull.  He reports his headache is temporal on the right with bilateral max sinus tenderness.  He also reports some PND and coughing.  He reports he feels his left ear is \"stopped up bad\" with roaring sensation.  Right ear is ringing with popping and cracking sensation.    Back Pain-reports some improvement in his back symptoms. He reports the pain is less in intensity and duration. He reports he continues to have some left hip pain and side discomfort. He is haivng less numbness in his legs. He has been to speciality and is not a candidate for surgical intervention. He reports it does continue to feel better.  Ongoing.   Left knee pain-reports it is locking. He reports it has been painful to try to stretch. He is using Icy hot.  Has been to ortho for eval and did get a joint injection.  He does have an upcoming follow up.  Not at goal.     The following portions of the patient's history were reviewed and updated as appropriate: allergies, current medications, past family history, past medical history, past social history, past surgical history and problem list.    Review of Systems   Constitutional: Positive for appetite change (increase) and fatigue. Negative for fever.   HENT: Positive for congestion, ear pain, sinus pressure, sore throat (mostly in the AM) and tinnitus. Negative for postnasal drip and rhinorrhea.    Eyes: Negative for pain and visual disturbance " "(wears glasses.  recent eye exam with new glasses).   Respiratory: Positive for cough, shortness of breath and wheezing. Negative for chest tightness.    Cardiovascular: Negative for chest pain and palpitations.   Gastrointestinal: Negative for abdominal pain, diarrhea and vomiting.   Genitourinary: Positive for dysuria (chronic) and testicular pain (chronic). Negative for hematuria and urgency.   Musculoskeletal: Positive for arthralgias, back pain (with radiation to BLE \"down to ankles\" ) and myalgias.   Neurological: Positive for weakness (BLE), numbness (and tingling with \"electricity\" sensation in his legs) and headaches (intermittent.  Reports \"3 last week\" but did not sleep). Negative for dizziness.   Psychiatric/Behavioral: Positive for sleep disturbance (due to discomfort). Negative for dysphoric mood and suicidal ideas. The patient is not nervous/anxious.    All other systems reviewed and are negative.      Objective     /84 (BP Location: Left arm, Patient Position: Sitting, Cuff Size: Adult)  Pulse 85  Temp 98.6 °F (37 °C) (Oral)   Ht 67\" (170.2 cm)  Wt 228 lb (103 kg)  SpO2 98%  BMI 35.71 kg/m2    Physical Exam   Constitutional: He is oriented to person, place, and time. He appears well-developed and well-nourished.   HENT:   Head: Normocephalic and atraumatic.   Right Ear: Tympanic membrane, external ear and ear canal normal.   Left Ear: Tympanic membrane, external ear and ear canal normal.   Nose: No mucosal edema or rhinorrhea.   Mouth/Throat: Oropharynx is clear and moist. No oropharyngeal exudate or posterior oropharyngeal erythema.   Eyes: Conjunctivae and EOM are normal. Pupils are equal, round, and reactive to light. No scleral icterus.   Neck: Normal range of motion. Neck supple. No JVD present. No thyromegaly present.   Cardiovascular: Normal rate, regular rhythm and normal heart sounds.    No murmur heard.  Pulmonary/Chest: Effort normal. No respiratory distress. He has wheezes " (scattered diffuse). He exhibits no tenderness.   Abdominal: Soft. Bowel sounds are normal. He exhibits no mass. There is no tenderness.   Musculoskeletal: He exhibits no edema.        Right elbow: Tenderness found. Medial epicondyle tenderness noted.        Left knee: He exhibits decreased range of motion and swelling (mild). He exhibits no ecchymosis. Tenderness found. Medial joint line and lateral joint line tenderness noted.        Lumbar back: He exhibits decreased range of motion, tenderness, pain and spasm. He exhibits no swelling.   Gait shuffling with limping noted.  More prominent on right side from behind.  Frequent position changes for sit to stand, leaning on wall and over exam table.      Skin Integrity  -  His right foot skin is intact.     Jaquan 's left foot skin is intact. .  Lymphadenopathy:        Head (right side): No submandibular adenopathy present.        Head (left side): No submandibular adenopathy present.     He has no cervical adenopathy.   Neurological: He is alert and oriented to person, place, and time. He has normal reflexes. No cranial nerve deficit. He exhibits normal muscle tone. Coordination normal.   Skin: Skin is warm and dry.   Psychiatric: His speech is normal and behavior is normal. Judgment and thought content normal. His mood appears not anxious. He is not actively hallucinating. Cognition and memory are normal. He exhibits a depressed mood. He expresses no homicidal and no suicidal ideation. He exhibits normal recent memory and normal remote memory. He is attentive.   Vitals reviewed.    Assessment/Plan     Problem List Items Addressed This Visit        Other    Chronic bilateral low back pain with left-sided sciatica    Relevant Medications    HYDROcodone-acetaminophen (NORCO) 7.5-325 MG per tablet      Other Visit Diagnoses     Acute non-recurrent sphenoidal sinusitis    -  Primary    Relevant Medications    clarithromycin (BIAXIN) 500 MG tablet    azelastine (ASTELIN)  0.1 % nasal spray    Asthma exacerbation        Relevant Medications    sodium chloride (OCEAN) 0.65 % nasal spray    albuterol (PROVENTIL HFA;VENTOLIN HFA) 108 (90 BASE) MCG/ACT inhaler        Hospital records reviewed.  Medications reconciled.  Instructed to complete all of antibiotics for acute illness.  Increase PO fluids, avoid caffeine.  Do not save meds for later use.  Rest PRN  Understands disease processes and need for medications.  Understands reasons for urgent and emergent care.  Patient (& family) verbalized agreement for treatment plan.   CHAVEZ reviewed today and consistent.  Will refill prescribed controlled medication today.  Patient is aware they cannot receive narcotics from any other provider.  Risk and benefits of medication use has been reviewed.  History and physical exam exhibit continued safe and appropriate use of controlled substances.  Continue under care of orthopedic for left knee pain.    Will follow up with [facility] regarding patient's PT order for knee pain.    RTC 1 month, sooner if needed.        This document has been electronically signed by:  BALDOMERO Thao, FNP-C

## 2017-07-24 ENCOUNTER — OFFICE VISIT (OUTPATIENT)
Dept: CARDIOLOGY | Facility: CLINIC | Age: 47
End: 2017-07-24

## 2017-07-24 ENCOUNTER — TELEPHONE (OUTPATIENT)
Dept: CARDIOLOGY | Facility: CLINIC | Age: 47
End: 2017-07-24

## 2017-07-24 VITALS
WEIGHT: 229.2 LBS | HEART RATE: 65 BPM | SYSTOLIC BLOOD PRESSURE: 130 MMHG | DIASTOLIC BLOOD PRESSURE: 82 MMHG | HEIGHT: 67 IN | BODY MASS INDEX: 35.97 KG/M2

## 2017-07-24 DIAGNOSIS — E78.2 MIXED HYPERLIPIDEMIA: ICD-10-CM

## 2017-07-24 DIAGNOSIS — R07.2 PRECORDIAL PAIN: Primary | ICD-10-CM

## 2017-07-24 DIAGNOSIS — J45.20 MILD INTERMITTENT ASTHMA WITHOUT COMPLICATION: ICD-10-CM

## 2017-07-24 DIAGNOSIS — I10 ESSENTIAL HYPERTENSION: ICD-10-CM

## 2017-07-24 PROCEDURE — 99213 OFFICE O/P EST LOW 20 MIN: CPT | Performed by: INTERNAL MEDICINE

## 2017-07-24 NOTE — PROGRESS NOTES
BALDOMERO Thao  Jaquan Mckay  1970 07/24/2017    Patient Active Problem List   Diagnosis   • GERD (gastroesophageal reflux disease)   • Arthritis   • Hypertension   • Seizure disorder   • Hyperlipidemia   • Anxiety   • Varicocele   • Varicocele present on ultrasound of scrotum   • Obesity (BMI 30.0-34.9)   • Chronic bilateral low back pain with left-sided sciatica   • Seasonal allergic rhinitis due to pollen   • Mild intermittent asthma without complication   • Precordial pain   • Dysuria   • Congenital coronary artery anomaly       Dear BALDOMERO Thao:    Subjective     Jaquan Mckay is a 46 y.o. male with the problems as listed above, presents      History of Present Illness:Mr. Mckay is a 46-year-old  male with history of chronic intermittent chest pains for which she underwent initially a treadmill stress EKG test when he walked for 9-1/2 minutes on a standard [place]ney protocol with no echocardiographic evidence of myocardial ischemia.  However since he did not achieve the target heart rate, he underwent a Lexiscan sestamibi study recently in June 2017 that revealed a small, mild degree of inferolateral myocardial ischemia.  He is here today for regular cardiac follow-up.  He says his chest pains are better.  He has dyspnea with mild exertion with no PND or orthopnea.  He has intermittent bilateral leg edema.  He has history of smoking for 5 years and continued to smoke about a pack in a week.  Previously used to smoke about pack and half a day.    Mr. Nely Sutton had some more chest pains recently for which he went to [facility] underwent emergency room and was admitted overnight and had cardiac catheterization which apparently did not reveal any significant obstructive coronary artery disease.  He did not require any coronary intervention.  He has been discharged home to follow-up with us.  Still has some intermittent chest soreness.  He has dry cough.  He has followed up  with his primary M.D. for this and has been placed on antibiotics.  He denies any shortness of breath.        Allergies   Allergen Reactions   • Paxil [Paroxetine Hcl] Shortness Of Breath     Chest pain    • Isosorbide Nitrate Rash     Rash, hives, had to use inhaler.    • Ciprofloxacin    • Contrast Dye    • Mobic [Meloxicam]    • Penicillins    • Prednisone    • Robitussin Cough+ Chest Max St  [Dextromethorphan-Guaifenesin]    • Sulfa Antibiotics    • Zoloft [Sertraline Hcl] Hives and Itching   • Keflex [Cephalexin] Rash   • Metoprolol Rash   :      Current Outpatient Prescriptions:   •  albuterol (PROVENTIL HFA;VENTOLIN HFA) 108 (90 BASE) MCG/ACT inhaler, Inhale 2 puffs Every 4 (Four) Hours As Needed for Shortness of Air., Disp: 1 inhaler, Rfl: 12  •  aspirin 81 MG tablet, Take 1 tablet by mouth Daily., Disp: 30 tablet, Rfl: 11  •  azelastine (ASTELIN) 0.1 % nasal spray, 1 spray into each nostril 2 (Two) Times a Day. 2 sprays both nostrils twice daily as needed, Disp: 1 each, Rfl: 5  •  clarithromycin (BIAXIN) 500 MG tablet, Take 1 tablet by mouth 2 (Two) Times a Day for 10 days., Disp: 20 tablet, Rfl: 0  •  dicyclomine (BENTYL) 20 MG tablet, TAKE 1 TABLET BY MOUTH THREE TIMES DAILY FOR STOMACH, Disp: 90 tablet, Rfl: 5  •  DILANTIN 100 MG ER capsule, TAKE 2 CAPSULES BY MOUTH EVERY MORNING AND 3 CAPSULES BY MOUTH EVERYEVENING, Disp: 150 capsule, Rfl: 5  •  gabapentin (NEURONTIN) 300 MG capsule, Take 1 capsule by mouth 3 (Three) Times a Day., Disp: , Rfl: 0  •  HYDROcodone-acetaminophen (NORCO) 7.5-325 MG per tablet, Take 1 tablet by mouth 2 (Two) Times a Day As Needed for Moderate Pain ., Disp: 60 tablet, Rfl: 0  •  lisinopril (PRINIVIL,ZESTRIL) 20 MG tablet, Take 1 tablet by mouth Daily., Disp: 30 tablet, Rfl: 5  •  loratadine (CLARITIN) 10 MG tablet, Take 1 tablet by mouth Daily As Needed for Allergies., Disp: 30 tablet, Rfl: 5  •  metoclopramide (REGLAN) 10 MG tablet, TAKE ONE TABLET BY MOUTH TWICE A DAY BEFORE  MEALS., Disp: 60 tablet, Rfl: 5  •  montelukast (SINGULAIR) 10 MG tablet, Take 1 tablet by mouth Every Night., Disp: 30 tablet, Rfl: 5  •  nitroglycerin (NITROSTAT) 0.4 MG SL tablet, 1 under the tongue as needed for angina, may repeat q5mins for up three doses, Disp: 25 tablet, Rfl: 3  •  pantoprazole (PROTONIX) 40 MG EC tablet, Take 1 tablet by mouth Daily., Disp: 30 tablet, Rfl: 5  •  pravastatin (PRAVACHOL) 40 MG tablet, TAKE 1 TABLET BY MOUTH AT BEDTIME TO LOWER CHOLESTROL, Disp: 30 tablet, Rfl: 5  •  SYMBICORT 160-4.5 MCG/ACT inhaler, INHALE TWO PUFFS TWICE DAILY, Disp: 10.2 g, Rfl: 5  •  docusate calcium (SURFAK) 240 MG capsule, Take 1 capsule by mouth 2 (Two) Times a Day., Disp: 60 capsule, Rfl: 0  •  methocarbamol (ROBAXIN) 750 MG tablet, Take 1 tablet by mouth 3 (Three) Times a Day As Needed for muscle spasms., Disp: 90 tablet, Rfl: 5  •  promethazine (PHENERGAN) 25 MG suppository, Insert 1 suppository into the rectum Every 6 (Six) Hours As Needed for Nausea or Vomiting., Disp: 12 suppository, Rfl: 0  •  raNITIdine (ZANTAC) 300 MG tablet, TAKE 1 TABLET BY MOUTH TWICE DAILY FOR STOMACH, Disp: 60 tablet, Rfl: 5  •  sodium chloride (OCEAN) 0.65 % nasal spray, 2 sprays into each nostril As Needed for Congestion., Disp: 30 mL, Rfl: 3  •  solifenacin (VESICARE) 5 MG tablet, Take 1 tablet by mouth daily., Disp: , Rfl:       The following portions of the patient's history were reviewed and updated as appropriate: allergies, current medications, past family history, past medical history, past social history, past surgical history and problem list.    Social History   Substance Use Topics   • Smoking status: Current Every Day Smoker     Packs/day: 2.00     Types: Cigars, Cigarettes     Start date: 5/5/2010   • Smokeless tobacco: Never Used   • Alcohol use No       Review of Systems   Constitution: Negative for chills and fever.   HENT: Negative for headaches, nosebleeds and sore throat.    Cardiovascular: Positive  "for palpitations. Negative for chest pain, leg swelling and syncope.   Respiratory: Negative for cough, hemoptysis, shortness of breath and wheezing.    Gastrointestinal: Negative for abdominal pain, hematemesis, hematochezia, melena, nausea and vomiting.   Genitourinary: Negative for dysuria and hematuria.   Neurological: Positive for dizziness.       Objective   Vitals:    07/24/17 0758   BP: 130/82   BP Location: Right arm   Patient Position: Sitting   Cuff Size: Adult   Pulse: 65   Weight: 229 lb 3.2 oz (104 kg)   Height: 67\" (170.2 cm)     Body mass index is 35.9 kg/(m^2).        Physical Exam   Constitutional: He is oriented to person, place, and time. He appears well-developed and well-nourished.   HENT:   Head: Normocephalic.   Eyes: Conjunctivae and EOM are normal.   Neck: Normal range of motion. Neck supple. No JVD present. No tracheal deviation present. No thyromegaly present.   Cardiovascular: Normal rate and regular rhythm.  Exam reveals no gallop and no friction rub.    No murmur heard.  Pulmonary/Chest: Breath sounds normal. No respiratory distress. He has no wheezes. He has no rales.   Abdominal: Soft. Bowel sounds are normal. He exhibits no mass. There is no tenderness.   Musculoskeletal: He exhibits no edema.   Neurological: He is alert and oriented to person, place, and time. No cranial nerve deficit.   Skin: Skin is warm and dry.   Psychiatric: He has a normal mood and affect.       Lab Results   Component Value Date     05/24/2017    K 4.3 05/24/2017     05/24/2017    CO2 29.6 05/24/2017    BUN 13 05/24/2017    CREATININE 0.92 05/24/2017    GLUCOSE 99 05/24/2017    CALCIUM 9.3 05/24/2017    AST 24 05/24/2017    ALT 23 05/24/2017    ALKPHOS 76 05/24/2017    LABIL2 1.0 (L) 05/24/2017     Lab Results   Component Value Date    CKTOTAL 134 05/13/2017     Lab Results   Component Value Date    WBC 5.85 05/24/2017    HGB 16.0 05/24/2017    HCT 46.1 05/24/2017     05/24/2017     Lab " Results   Component Value Date    INR 0.99 12/20/2016    INR 1.00 11/07/2016    INR 0.96 05/29/2016     Lab Results   Component Value Date    MG 2.0 08/13/2015     Lab Results   Component Value Date    TSH 1.974 05/24/2017    PSA 0.59 12/09/2014    CHLPL 232 (H) 04/05/2016    TRIG 90 05/24/2017    HDL 66 05/24/2017     (H) 04/05/2016      Lab Results   Component Value Date    BNP 12 02/26/2015     Echo   No results found for: ECHOEFEST  Procedures    Assessment/Plan  :    1. Precordial pain     2. Mild intermittent asthma without complication     3. Essential hypertension     4. Mixed hyperlipidemia         Recommendations:    1. Continue with low-dose aspirin and other medications.  2. Try to get his records from [facility].  3. Follow-up in 6 months       Return in about 6 months (around 1/24/2018).    As always, I appreciate very much the opportunity to participate in the cardiovascular care of your patients.      With Best Regards,    Leandro Daily MD, [facility]    Dragon disclaimer:  Much of this encounter note is an electronic transcription/translation of spoken language to printed text. The electronic translation of spoken language may permit erroneous, or at times, nonsensical words or phrases to be inadvertently transcribed; Although I have reviewed the note for such errors, some may still exist.

## 2017-08-15 ENCOUNTER — OFFICE VISIT (OUTPATIENT)
Dept: FAMILY MEDICINE CLINIC | Facility: CLINIC | Age: 47
End: 2017-08-15

## 2017-08-15 VITALS
WEIGHT: 229 LBS | HEART RATE: 58 BPM | DIASTOLIC BLOOD PRESSURE: 65 MMHG | SYSTOLIC BLOOD PRESSURE: 115 MMHG | BODY MASS INDEX: 35.94 KG/M2 | TEMPERATURE: 98.2 F | OXYGEN SATURATION: 98 % | HEIGHT: 67 IN

## 2017-08-15 DIAGNOSIS — G40.909 SEIZURE DISORDER (HCC): ICD-10-CM

## 2017-08-15 DIAGNOSIS — M25.572 ACUTE LEFT ANKLE PAIN: Primary | ICD-10-CM

## 2017-08-15 DIAGNOSIS — E78.2 MIXED HYPERLIPIDEMIA: ICD-10-CM

## 2017-08-15 DIAGNOSIS — M54.42 CHRONIC BILATERAL LOW BACK PAIN WITH LEFT-SIDED SCIATICA: ICD-10-CM

## 2017-08-15 DIAGNOSIS — G89.29 CHRONIC BILATERAL LOW BACK PAIN WITH LEFT-SIDED SCIATICA: ICD-10-CM

## 2017-08-15 DIAGNOSIS — I10 ESSENTIAL HYPERTENSION: ICD-10-CM

## 2017-08-15 PROCEDURE — 99214 OFFICE O/P EST MOD 30 MIN: CPT | Performed by: NURSE PRACTITIONER

## 2017-08-15 RX ORDER — HYDROCODONE BITARTRATE AND ACETAMINOPHEN 7.5; 325 MG/1; MG/1
1 TABLET ORAL 2 TIMES DAILY PRN
Qty: 60 TABLET | Refills: 0 | Status: SHIPPED | OUTPATIENT
Start: 2017-08-15 | End: 2017-09-12 | Stop reason: SDUPTHER

## 2017-08-15 RX ORDER — PHENTERMINE HYDROCHLORIDE 37.5 MG/1
37.5 TABLET ORAL
Qty: 30 TABLET | Refills: 0 | Status: SHIPPED | OUTPATIENT
Start: 2017-08-15 | End: 2017-09-12 | Stop reason: SDUPTHER

## 2017-08-15 NOTE — PROGRESS NOTES
"Subjective   Jaquan Mckay is a 46 y.o. male.     Chief Complaint   Patient presents with   • Ankle Pain   • Back Pain   • Knee Pain       History of Present Illness     Chest pain-reports he has not had any further CP.  Has seen Dr Daily since his appt here.  No med changes.    Left ankle sprain-reports he has had a recent strain and the heel pain he was having has increased since the injury.  He reports the pain in his leg and ankle has made his posterior calf sore.  He reports he has been using an air cast boot at times to aid with the pain.  He reports the injury occurred playing ball.  He reports he is unable to tolerate much walking or pressure on his foot.  He reports the accident happened approx 3 weeks ago.  He was seen at [facility] and did have imaging there.  Not at goal.   Sinus complaint-has resolved with antibiotic use.  No further concerns.  He was able to tolerate Biaxin well.    Headaches-\"couple times per week\". He does feel they are improving.  He did not continue taking the Gabapentin due to developing a rash.  He reports he had to take benadryl several days to help with the rash.  Stable.    The following portions of the patient's history were reviewed and updated as appropriate: allergies, current medications, past family history, past medical history, past social history, past surgical history and problem list.    Review of Systems   Constitutional: Positive for fatigue. Negative for appetite change and fever.   HENT: Positive for sore throat (mostly in the AM). Negative for congestion, ear pain, postnasal drip, rhinorrhea, sinus pressure and tinnitus.    Eyes: Negative for pain and visual disturbance (wears glasses.  recent eye exam with new glasses).   Respiratory: Negative for cough, chest tightness, shortness of breath and wheezing.    Cardiovascular: Negative for chest pain and palpitations.   Gastrointestinal: Negative for abdominal pain, diarrhea and vomiting.   Genitourinary: Positive for " "dysuria (chronic) and testicular pain (chronic). Negative for hematuria and urgency.   Musculoskeletal: Positive for arthralgias, back pain (with radiation to BLE \"down to ankles\" ) and myalgias.   Neurological: Positive for weakness (BLE), numbness (and tingling with \"electricity\" sensation in his legs) and headaches (improving). Negative for dizziness.   Psychiatric/Behavioral: Negative for dysphoric mood, sleep disturbance and suicidal ideas. The patient is not nervous/anxious.    All other systems reviewed and are negative.      Objective     /65 (BP Location: Left arm, Patient Position: Sitting)  Pulse 58  Temp 98.2 °F (36.8 °C) (Tympanic)   Ht 67\" (170.2 cm)  Wt 229 lb (104 kg)  SpO2 98%  BMI 35.87 kg/m2    Physical Exam   Constitutional: He is oriented to person, place, and time. He appears well-developed and well-nourished.   HENT:   Head: Normocephalic and atraumatic.   Right Ear: Tympanic membrane, external ear and ear canal normal.   Left Ear: Tympanic membrane, external ear and ear canal normal.   Nose: No mucosal edema or rhinorrhea.   Mouth/Throat: Oropharynx is clear and moist. No oropharyngeal exudate or posterior oropharyngeal erythema.   Eyes: Conjunctivae and EOM are normal. Pupils are equal, round, and reactive to light. No scleral icterus.   Neck: Normal range of motion. Neck supple. No JVD present. No thyromegaly present.   Cardiovascular: Normal rate, regular rhythm and normal heart sounds.    No murmur heard.  Pulmonary/Chest: Effort normal. No respiratory distress. He has no wheezes. He exhibits no tenderness.   Abdominal: Soft. Bowel sounds are normal. He exhibits no mass. There is no tenderness.   Musculoskeletal: He exhibits no edema.        Right elbow: Tenderness found. Medial epicondyle tenderness noted.        Left knee: He exhibits decreased range of motion and swelling (mild). He exhibits no ecchymosis. Tenderness found. Medial joint line and lateral joint line " tenderness noted.        Lumbar back: He exhibits decreased range of motion, tenderness, pain and spasm. He exhibits no swelling.   Gait shuffling with limping noted.  More prominent on right side from behind.  Frequent position changes for sit to stand, leaning on wall and over exam table.      Skin Integrity  -  His right foot skin is intact.     Jaquan 's left foot skin is intact. .  Lymphadenopathy:        Head (right side): No submandibular adenopathy present.        Head (left side): No submandibular adenopathy present.     He has no cervical adenopathy.   Neurological: He is alert and oriented to person, place, and time. He has normal reflexes. No cranial nerve deficit. He exhibits normal muscle tone. Coordination normal.   Skin: Skin is warm and dry.   Psychiatric: His speech is normal and behavior is normal. Judgment and thought content normal. His mood appears not anxious. He is not actively hallucinating. Cognition and memory are normal. He exhibits a depressed mood. He expresses no homicidal and no suicidal ideation. He exhibits normal recent memory and normal remote memory. He is attentive.   Vitals reviewed.    Assessment/Plan     Problem List Items Addressed This Visit        Cardiovascular and Mediastinum    Hypertension    Relevant Orders    Lipid Panel    CBC & Differential    Hyperlipidemia    Relevant Orders    Lipid Panel       Nervous and Auditory    Seizure disorder    Relevant Orders    Phenytoin level, free    Phenytoin level, total       Other    Chronic bilateral low back pain with left-sided sciatica    Relevant Medications    HYDROcodone-acetaminophen (NORCO) 7.5-325 MG per tablet    Other Relevant Orders    Urine Drug Screen    BMI 35.0-35.9,adult    Relevant Medications    phentermine (ADIPEX-P) 37.5 MG tablet    Other Relevant Orders    Urine Drug Screen      Other Visit Diagnoses     Acute left ankle pain    -  Primary    Relevant Medications    Elastic Bandages & Supports (ANKLE  LACE-UP BRACE) [facility]        Labs ordered.  Patient to obtain at [facility].   UDS today per [facility].  CHAVEZ reviewed today and consistent.  Will refill prescribed controlled medication today.  Patient is aware they cannot receive narcotics from any other provider.  Risk and benefits of medication use has been reviewed.  History and physical exam exhibit continued safe and appropriate use of controlled substances.  Understands disease processes and need for medications.  Understands reasons for urgent and emergent care.  Patient (& family) verbalized agreement for treatment plan.   RTC 1 month, sooner if needed.        This document has been electronically signed by:  BALDOMERO Thao, FNP-C

## 2017-08-16 ENCOUNTER — LAB (OUTPATIENT)
Dept: LAB | Facility: HOSPITAL | Age: 47
End: 2017-08-16
Attending: INTERNAL MEDICINE

## 2017-08-16 DIAGNOSIS — I10 ESSENTIAL HYPERTENSION: ICD-10-CM

## 2017-08-16 DIAGNOSIS — E78.2 MIXED HYPERLIPIDEMIA: ICD-10-CM

## 2017-08-16 DIAGNOSIS — G40.909 SEIZURE DISORDER (HCC): ICD-10-CM

## 2017-08-16 LAB
6-ACETYL MORPHINE: NEGATIVE
AMPHET+METHAMPHET UR QL: NEGATIVE
BARBITURATES UR QL SCN: NEGATIVE
BASOPHILS # BLD AUTO: 0.01 10*3/MM3 (ref 0–0.3)
BASOPHILS NFR BLD AUTO: 0.2 % (ref 0–2)
BENZODIAZ UR QL SCN: NEGATIVE
BUPRENORPHINE SERPL-MCNC: NEGATIVE NG/ML
CANNABINOIDS SERPL QL: NEGATIVE
CHOLEST SERPL-MCNC: 236 MG/DL (ref 0–200)
COCAINE UR QL: NEGATIVE
DEPRECATED RDW RBC AUTO: 42.9 FL (ref 37–54)
EOSINOPHIL # BLD AUTO: 0.11 10*3/MM3 (ref 0–0.7)
EOSINOPHIL NFR BLD AUTO: 2.1 % (ref 0–5)
ERYTHROCYTE [DISTWIDTH] IN BLOOD BY AUTOMATED COUNT: 12.8 % (ref 11.5–14.5)
HCT VFR BLD AUTO: 42.5 % (ref 42–52)
HDLC SERPL-MCNC: 71 MG/DL (ref 60–100)
HGB BLD-MCNC: 14.8 G/DL (ref 14–18)
IMM GRANULOCYTES # BLD: 0.01 10*3/MM3 (ref 0–0.03)
IMM GRANULOCYTES NFR BLD: 0.2 % (ref 0–0.5)
LDLC SERPL CALC-MCNC: 150 MG/DL (ref 0–100)
LDLC/HDLC SERPL: 2.12 {RATIO}
LYMPHOCYTES # BLD AUTO: 1.64 10*3/MM3 (ref 1–3)
LYMPHOCYTES NFR BLD AUTO: 31.7 % (ref 21–51)
MCH RBC QN AUTO: 32.7 PG (ref 27–33)
MCHC RBC AUTO-ENTMCNC: 34.8 G/DL (ref 33–37)
MCV RBC AUTO: 93.8 FL (ref 80–94)
METHADONE UR QL SCN: NEGATIVE
MONOCYTES # BLD AUTO: 0.77 10*3/MM3 (ref 0.1–0.9)
MONOCYTES NFR BLD AUTO: 14.9 % (ref 0–10)
NEUTROPHILS # BLD AUTO: 2.63 10*3/MM3 (ref 1.4–6.5)
NEUTROPHILS NFR BLD AUTO: 50.9 % (ref 30–70)
OPIATES UR QL: POSITIVE
OXYCODONE UR QL SCN: NEGATIVE
PCP UR QL SCN: NEGATIVE
PHENYTOIN SERPL-MCNC: 18.5 MCG/ML (ref 10–20)
PLATELET # BLD AUTO: 158 10*3/MM3 (ref 130–400)
PMV BLD AUTO: 10.1 FL (ref 6–10)
RBC # BLD AUTO: 4.53 10*6/MM3 (ref 4.7–6.1)
TRIGL SERPL-MCNC: 74 MG/DL (ref 0–150)
VLDLC SERPL-MCNC: 14.8 MG/DL
WBC NRBC COR # BLD: 5.17 10*3/MM3 (ref 4.5–12.5)

## 2017-08-16 PROCEDURE — 80307 DRUG TEST PRSMV CHEM ANLYZR: CPT | Performed by: NURSE PRACTITIONER

## 2017-08-16 PROCEDURE — 80185 ASSAY OF PHENYTOIN TOTAL: CPT | Performed by: NURSE PRACTITIONER

## 2017-08-16 PROCEDURE — 36415 COLL VENOUS BLD VENIPUNCTURE: CPT

## 2017-08-16 PROCEDURE — 85025 COMPLETE CBC W/AUTO DIFF WBC: CPT | Performed by: NURSE PRACTITIONER

## 2017-08-16 PROCEDURE — 80186 ASSAY OF PHENYTOIN FREE: CPT | Performed by: NURSE PRACTITIONER

## 2017-08-16 PROCEDURE — 80061 LIPID PANEL: CPT | Performed by: NURSE PRACTITIONER

## 2017-08-18 LAB — PHENYTOIN FREE SERPL-MCNC: 1.5 UG/ML (ref 1–2)

## 2017-08-21 DIAGNOSIS — G40.802 OTHER EPILEPSY WITHOUT STATUS EPILEPTICUS, NOT INTRACTABLE (HCC): ICD-10-CM

## 2017-08-21 DIAGNOSIS — K21.9 GASTROESOPHAGEAL REFLUX DISEASE WITHOUT ESOPHAGITIS: ICD-10-CM

## 2017-08-21 RX ORDER — METOCLOPRAMIDE 10 MG/1
TABLET ORAL
Qty: 60 TABLET | Refills: 5 | Status: SHIPPED | OUTPATIENT
Start: 2017-08-21 | End: 2017-10-02

## 2017-08-21 RX ORDER — PHENYTOIN SODIUM 100 MG/1
CAPSULE, EXTENDED RELEASE ORAL
Qty: 150 CAPSULE | Refills: 5 | Status: SHIPPED | OUTPATIENT
Start: 2017-08-21 | End: 2017-09-12 | Stop reason: SDUPTHER

## 2017-08-30 RX ORDER — DOXYCYCLINE 100 MG/1
100 CAPSULE ORAL 2 TIMES DAILY
Qty: 20 CAPSULE | Refills: 0 | Status: SHIPPED | OUTPATIENT
Start: 2017-08-30 | End: 2017-10-02

## 2017-09-07 ENCOUNTER — OFFICE VISIT (OUTPATIENT)
Dept: ORTHOPEDIC SURGERY | Facility: CLINIC | Age: 47
End: 2017-09-07

## 2017-09-07 VITALS — WEIGHT: 232 LBS | RESPIRATION RATE: 16 BRPM | HEIGHT: 67 IN | BODY MASS INDEX: 36.41 KG/M2

## 2017-09-07 DIAGNOSIS — M94.262 CHONDROMALACIA, KNEE, LEFT: Primary | ICD-10-CM

## 2017-09-07 PROCEDURE — 99213 OFFICE O/P EST LOW 20 MIN: CPT | Performed by: ORTHOPAEDIC SURGERY

## 2017-09-07 RX ORDER — SODIUM CHLORIDE 0.9 % (FLUSH) 0.9 %
1-10 SYRINGE (ML) INJECTION AS NEEDED
Status: CANCELLED | OUTPATIENT
Start: 2017-09-07

## 2017-09-07 NOTE — PROGRESS NOTES
Subjective   Patient ID: Jauqan Mckay is a 46 y.o. male  Follow-up of the Left Knee             History of Present Illness    Continued constant burning left anterior knee pain prevents him from bending twisting lifting with swelling and tightness, therapy and injections have not helped, MR in the past has shown probable chondromalacia changes.  He would like to discuss surgery due to his failure to improve in constant pain.  He has a history of her right knee arthroscopic treatment with relief of anterior knee pain which is of a similar nature many years ago.    Review of Systems   Constitutional: Negative for diaphoresis, fever and unexpected weight change.   HENT: Negative for dental problem and sore throat.    Eyes: Negative for visual disturbance.   Respiratory: Negative for shortness of breath.    Cardiovascular: Negative for chest pain.   Gastrointestinal: Negative for abdominal pain, constipation, diarrhea, nausea and vomiting.   Genitourinary: Negative for difficulty urinating and frequency.   Musculoskeletal: Positive for arthralgias.   Neurological: Negative for headaches.   Hematological: Does not bruise/bleed easily.   All other systems reviewed and are negative.      Past Medical History:   Diagnosis Date   • Allergic    • Anxiety    • Arthritis    • Asthma    • Clotting disorder 2004    had a knee surgery   • Depression    • Gastric ulcer    • GERD (gastroesophageal reflux disease)    • H/O migraine    • H/O sleep apnea    • Headache    • Heart attack    • History of epilepsy    • History of seizures    • Hyperlipidemia    • Hypertension    • Knee pain, acute     Left   • Low back pain    • Migraine    • Obesity    • [place] spotted fever    • Seizures         Past Surgical History:   Procedure Laterality Date   • BACK SURGERY     • BRAIN SURGERY  1986    Tumor removal    • CHOLECYSTECTOMY     • KNEE SURGERY Right    • TUMOR EXCISION      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  "      Family History   Problem Relation Age of Onset   • Diabetes Mother    • Hypertension Mother    • Stroke Mother    • Diabetes Father    • Skin cancer Father    • Hypertension Father    • Heart attack Father    • Diabetes Brother    • Hypertension Brother    • Heart disease Maternal Aunt    • Heart disease Maternal Uncle    • Heart disease Paternal Aunt    • Heart disease Paternal Uncle    • Heart disease Maternal Grandmother    • Heart disease Maternal Grandfather    • Heart disease Paternal Grandmother    • Heart disease Paternal Grandfather        Social History     Social History   • Marital status:      Spouse name: N/A   • Number of children: 2   • Years of education: 12     Occupational History   • DISABLED      Social History Main Topics   • Smoking status: Current Every Day Smoker     Packs/day: 2.00     Types: Cigars, Cigarettes     Start date: 5/5/2010   • Smokeless tobacco: Never Used   • Alcohol use No   • Drug use: No   • Sexual activity: Defer     Other Topics Concern   • Not on file     Social History Narrative       All the above social hx, family hx, surgical history,medications, allergies, ros & HPI reviewed.    Allergies   Allergen Reactions   • Paxil [Paroxetine Hcl] Shortness Of Breath     Chest pain    • Isosorbide Nitrate Rash     Rash, hives, had to use inhaler.    • Ciprofloxacin    • Contrast Dye    • Mobic [Meloxicam]    • Penicillins    • Prednisone    • Robitussin Cough+ Chest Max St  [Dextromethorphan-Guaifenesin]    • Sulfa Antibiotics    • Zoloft [Sertraline Hcl] Hives and Itching   • Gabapentin Rash   • Keflex [Cephalexin] Rash   • Metoprolol Rash       Objective   Resp 16  Ht 67\" (170.2 cm)  Wt 232 lb (105 kg)  BMI 36.34 kg/m2   Physical Exam  Constitutional: He is oriented to person, place, and time. He appears well-developed and well-nourished.   HENT:   Head: Normocephalic and atraumatic.   Eyes: EOM are normal. Pupils are equal, round, and reactive to light. "   Neck: Normal range of motion. Neck supple.   Cardiovascular: Normal rate.    Pulmonary/Chest: Effort normal and breath sounds normal.   Abdominal: Soft.   Neurological: He is alert and oriented to person, place, and time.   Skin: Skin is warm and dry.   Psychiatric: He has a normal mood and affect.   Nursing note and vitals reviewed.       Ortho Exam       Assessment/Plan   Review of Radiographic Studies:    No new imaging done today.      Procedures     Jaquan was seen today for follow-up.    Diagnoses and all orders for this visit:    Chondromalacia, knee, left     Orthopedic activities reviewed and patient expressed appreciation and Risk, benefits, and merits of treatment alternatives reviewed with the patient and questions answered      Recommendations/Plan:   Work/Activity Status: May perform usual activities as tolerated    Patient agreeable to call or return sooner for any concerns.         I discussed with the patient the diagnosis, condition, natural history and treatment alternatives, both surgical and nonsurgical.  I reviewed the surgical procedural details using models, diagrams and reviewing x-ray findings.  I explained the nature of the operation, anesthesia methods and the postoperative recovery.  I explained risks and complications including but not limited to infection, bleeding, blood clotting, poor healing, chronic pain, stiffness, failure of the procedure, possible recurrence of the condition and anesthetic related risks.  The patient had opportunity to ask questions which were all answered to their satisfaction and decided to proceed with the plan for surgery.  I believe informed consent to proceed with the surgery was given verbally in my presence today.  The surgical consent form will be signed in the presence of the nursing staff prior to the surgery.    Impression:  Left anterior knee pain probable chondromalacia patella  Plan:  Diagnostic arthroscopy left knee with chondroplasty or other  procedures as indicated    On 10-17 discussed with the anesthesia department patient taking weight loss medications and advised patient he needs to be finished with his weight loss medications 2 weeks prior to surgery.

## 2017-09-12 ENCOUNTER — TELEPHONE (OUTPATIENT)
Dept: CARDIOLOGY | Facility: CLINIC | Age: 47
End: 2017-09-12

## 2017-09-12 ENCOUNTER — OFFICE VISIT (OUTPATIENT)
Dept: FAMILY MEDICINE CLINIC | Facility: CLINIC | Age: 47
End: 2017-09-12

## 2017-09-12 VITALS
OXYGEN SATURATION: 97 % | BODY MASS INDEX: 35.63 KG/M2 | SYSTOLIC BLOOD PRESSURE: 120 MMHG | HEART RATE: 81 BPM | HEIGHT: 67 IN | TEMPERATURE: 98 F | WEIGHT: 227 LBS | DIASTOLIC BLOOD PRESSURE: 85 MMHG

## 2017-09-12 DIAGNOSIS — G40.802 OTHER EPILEPSY WITHOUT STATUS EPILEPTICUS, NOT INTRACTABLE (HCC): ICD-10-CM

## 2017-09-12 DIAGNOSIS — J30.1 SEASONAL ALLERGIC RHINITIS DUE TO POLLEN: Primary | ICD-10-CM

## 2017-09-12 DIAGNOSIS — M54.42 CHRONIC BILATERAL LOW BACK PAIN WITH LEFT-SIDED SCIATICA: ICD-10-CM

## 2017-09-12 DIAGNOSIS — G89.29 CHRONIC BILATERAL LOW BACK PAIN WITH LEFT-SIDED SCIATICA: ICD-10-CM

## 2017-09-12 PROBLEM — M94.269 CHONDROMALACIA, KNEE: Status: ACTIVE | Noted: 2017-09-12

## 2017-09-12 PROCEDURE — 99214 OFFICE O/P EST MOD 30 MIN: CPT | Performed by: NURSE PRACTITIONER

## 2017-09-12 RX ORDER — PHENTERMINE HYDROCHLORIDE 37.5 MG/1
37.5 TABLET ORAL
Qty: 30 TABLET | Refills: 0 | Status: SHIPPED | OUTPATIENT
Start: 2017-09-12 | End: 2017-10-13

## 2017-09-12 RX ORDER — PHENYTOIN SODIUM 100 MG/1
CAPSULE, EXTENDED RELEASE ORAL
Qty: 150 CAPSULE | Refills: 5 | Status: SHIPPED | OUTPATIENT
Start: 2017-09-12 | End: 2018-02-07 | Stop reason: SDUPTHER

## 2017-09-12 RX ORDER — HYDROCODONE BITARTRATE AND ACETAMINOPHEN 7.5; 325 MG/1; MG/1
1 TABLET ORAL 2 TIMES DAILY PRN
Qty: 60 TABLET | Refills: 0 | Status: SHIPPED | OUTPATIENT
Start: 2017-09-12 | End: 2017-10-13 | Stop reason: SDUPTHER

## 2017-09-12 RX ORDER — CHLORHEXIDINE GLUCONATE 4 G/100ML
SOLUTION TOPICAL DAILY PRN
Qty: 118 ML | Refills: 2 | Status: SHIPPED | OUTPATIENT
Start: 2017-09-12 | End: 2017-11-13

## 2017-09-12 NOTE — PROGRESS NOTES
"Subjective   Jaquan Mckay is a 46 y.o. male.     Chief Complaint   Patient presents with   • Ankle Pain   • Hypertension   • Knee Pain       History of Present Illness     Knee Pain-He reports he has been to ortho and is going to have to have surgery on his left knee.  He reports he was not able to do all of the test for his knee while at that office due to pain.  He will have to do PT after the surgery.    Obesity-continues to work on weight loss and has had positive loss since his last appt.    Headaches-Occasional but has been improved with nasal spray use.  Sinus pressure is less.    CP-None in \"quite a while\".  He does not have to follow up with Cardio for a year unless he has problems.     The following portions of the patient's history were reviewed and updated as appropriate: allergies, current medications, past family history, past medical history, past social history, past surgical history and problem list.    Review of Systems   Constitutional: Positive for fatigue. Negative for appetite change and fever.   HENT: Positive for sore throat (mostly in the AM). Negative for congestion, ear pain, postnasal drip, rhinorrhea, sinus pressure and tinnitus.    Eyes: Negative for pain and visual disturbance (wears glasses.  recent eye exam with new glasses).   Respiratory: Positive for shortness of breath (minimally.  Some mold exposure). Negative for cough, chest tightness and wheezing.    Cardiovascular: Negative for chest pain and palpitations.   Gastrointestinal: Negative for abdominal pain, diarrhea and vomiting.   Genitourinary: Positive for dysuria (chronic) and testicular pain (chronic). Negative for hematuria and urgency.   Musculoskeletal: Positive for arthralgias, back pain (with radiation to BLE \"down to ankles\" but is Much improved.) and myalgias.   Neurological: Positive for weakness (BLE), numbness (and tingling with \"electricity\" sensation in his legs) and headaches (improving). Negative for " "dizziness.   Psychiatric/Behavioral: Negative for dysphoric mood, sleep disturbance and suicidal ideas. The patient is not nervous/anxious.    All other systems reviewed and are negative.      Objective     /85 (BP Location: Left arm, Patient Position: Sitting)  Pulse 81  Temp 98 °F (36.7 °C) (Tympanic)   Ht 67\" (170.2 cm)  Wt 227 lb (103 kg)  SpO2 97%  BMI 35.55 kg/m2    Physical Exam   Constitutional: He is oriented to person, place, and time. He appears well-developed and well-nourished.   HENT:   Head: Normocephalic and atraumatic.   Right Ear: Tympanic membrane, external ear and ear canal normal.   Left Ear: Tympanic membrane, external ear and ear canal normal.   Nose: No mucosal edema or rhinorrhea.   Mouth/Throat: Oropharynx is clear and moist. No oropharyngeal exudate or posterior oropharyngeal erythema.   Eyes: Conjunctivae and EOM are normal. Pupils are equal, round, and reactive to light. No scleral icterus.   Neck: Normal range of motion. Neck supple. No JVD present. No thyromegaly present.   Cardiovascular: Normal rate, regular rhythm and normal heart sounds.    No murmur heard.  Pulmonary/Chest: Effort normal. No respiratory distress. He has no wheezes. He exhibits no tenderness.   Abdominal: Soft. Bowel sounds are normal. He exhibits no mass. There is no tenderness.   Musculoskeletal: He exhibits no edema.        Right elbow: Tenderness found. Medial epicondyle tenderness noted.        Left knee: He exhibits decreased range of motion and swelling (mild). He exhibits no ecchymosis. Tenderness found. Medial joint line and lateral joint line tenderness noted.        Lumbar back: He exhibits decreased range of motion, tenderness, pain and spasm. He exhibits no swelling.   Gait shuffling with limping noted.  More prominent on right side from behind      Skin Integrity  -  His right foot skin is intact.     Jaquan 's left foot skin is intact. .  Lymphadenopathy:        Head (right side): No " submandibular adenopathy present.        Head (left side): No submandibular adenopathy present.     He has no cervical adenopathy.   Neurological: He is alert and oriented to person, place, and time. He has normal reflexes. No cranial nerve deficit. He exhibits normal muscle tone. Coordination normal.   Skin: Skin is warm and dry.   Psychiatric: His speech is normal and behavior is normal. Judgment and thought content normal. His mood appears not anxious. He is not actively hallucinating. Cognition and memory are normal. He exhibits a depressed mood. He expresses no homicidal and no suicidal ideation. He exhibits normal recent memory and normal remote memory. He is attentive.   Vitals reviewed.    Assessment/Plan     Problem List Items Addressed This Visit        Other    Chronic bilateral low back pain with left-sided sciatica    Relevant Medications    HYDROcodone-acetaminophen (NORCO) 7.5-325 MG per tablet    Seasonal allergic rhinitis due to pollen - Primary    Relevant Orders    Ambulatory Referral to Allergy (Completed)    BMI 35.0-35.9,adult    Relevant Medications    phentermine (ADIPEX-P) 37.5 MG tablet      Other Visit Diagnoses     Other epilepsy without status epilepticus, not intractable        Relevant Medications    phenytoin (DILANTIN) 100 MG ER capsule        Discussion regarding cholesterol meds.  If diet and weight loss do not improve cholesterol overall, will consider change of meds.  Continue under care of ortho.  Understands he may have surgical clearance testing if needed.   Continue with weight reduction plan.  CHAVEZ reviewed today and consistent.  Will refill prescribed controlled medication today.  Patient is aware they cannot receive narcotics from any other provider.  Risk and benefits of medication use has been reviewed.  History and physical exam exhibit continued safe and appropriate use of controlled substances.  RTC 1 month, sooner if needed.          This document has been  electronically signed by:  BALDOMERO Thao, FNP-C

## 2017-09-12 NOTE — TELEPHONE ENCOUNTER
Called '[facility] and requested his Cath and they are also sending his Echo he had done.

## 2017-09-15 DIAGNOSIS — J45.20 MILD INTERMITTENT ASTHMA WITHOUT COMPLICATION: ICD-10-CM

## 2017-09-15 DIAGNOSIS — J30.1 SEASONAL ALLERGIC RHINITIS DUE TO POLLEN: ICD-10-CM

## 2017-09-18 RX ORDER — MONTELUKAST SODIUM 10 MG/1
TABLET ORAL
Qty: 30 TABLET | Refills: 5 | Status: SHIPPED | OUTPATIENT
Start: 2017-09-18 | End: 2018-06-01 | Stop reason: SDUPTHER

## 2017-09-18 RX ORDER — LORATADINE 10 MG/1
TABLET ORAL
Qty: 30 TABLET | Refills: 5 | Status: ON HOLD | OUTPATIENT
Start: 2017-09-18 | End: 2018-09-25

## 2017-09-18 RX ORDER — NITROGLYCERIN 0.4 MG/1
TABLET SUBLINGUAL
Qty: 25 TABLET | Refills: 3 | Status: SHIPPED | OUTPATIENT
Start: 2017-09-18 | End: 2018-03-21

## 2017-09-22 ENCOUNTER — HOSPITAL ENCOUNTER (OUTPATIENT)
Dept: GENERAL RADIOLOGY | Facility: HOSPITAL | Age: 47
Discharge: HOME | End: 2017-09-22
Admitting: NURSE PRACTITIONER

## 2017-09-22 ENCOUNTER — OFFICE VISIT (OUTPATIENT)
Dept: FAMILY MEDICINE CLINIC | Facility: CLINIC | Age: 47
End: 2017-09-22

## 2017-09-22 VITALS
BODY MASS INDEX: 35.47 KG/M2 | TEMPERATURE: 97.7 F | OXYGEN SATURATION: 97 % | HEIGHT: 67 IN | DIASTOLIC BLOOD PRESSURE: 85 MMHG | HEART RATE: 82 BPM | WEIGHT: 226 LBS | SYSTOLIC BLOOD PRESSURE: 120 MMHG

## 2017-09-22 DIAGNOSIS — R05.9 COUGH: Primary | ICD-10-CM

## 2017-09-22 DIAGNOSIS — J45.21 MILD INTERMITTENT ASTHMA WITH ACUTE EXACERBATION: ICD-10-CM

## 2017-09-22 PROCEDURE — 71020 XR CHEST 2 VW: CPT | Performed by: RADIOLOGY

## 2017-09-22 PROCEDURE — 99213 OFFICE O/P EST LOW 20 MIN: CPT | Performed by: NURSE PRACTITIONER

## 2017-09-22 PROCEDURE — 71020 HC CHEST PA AND LATERAL: CPT

## 2017-09-22 RX ORDER — CLARITHROMYCIN 500 MG/1
500 TABLET, COATED ORAL 2 TIMES DAILY
Qty: 20 TABLET | Refills: 0 | Status: SHIPPED | OUTPATIENT
Start: 2017-09-22 | End: 2017-10-02

## 2017-09-22 NOTE — PROGRESS NOTES
"Subjective   Jaquan Mckay is a 46 y.o. male.     Chief Complaint   Patient presents with   • Shortness of Breath       History of Present Illness     SOA-for last few weeks.  Reports some mold exposure at home.  Is having wheezing that is worse at night.  He reports he has been using his inhalers but they do not seem to be helping.  He reports decrease exercise tolerance.  Some coughing without production.  He reports chest wall soreness bilaterally on lower portion of ribs.  No rhinorrhea or congestion.  Not at goal.     The following portions of the patient's history were reviewed and updated as appropriate: allergies, current medications, past family history, past medical history, past social history, past surgical history and problem list.    Review of Systems   Constitutional: Negative for appetite change and fever.   HENT: Positive for congestion. Negative for sore throat.    Respiratory: Positive for cough, chest tightness, shortness of breath and wheezing.    Cardiovascular: Negative for chest pain.   Neurological: Positive for headaches. Negative for dizziness.   All other systems reviewed and are negative.      Objective     /85 (BP Location: Left arm, Patient Position: Sitting)  Pulse 82  Temp 97.7 °F (36.5 °C) (Tympanic)   Ht 67\" (170.2 cm)  Wt 226 lb (103 kg)  SpO2 97%  BMI 35.4 kg/m2    Physical Exam   Constitutional: He is oriented to person, place, and time. He appears well-developed and well-nourished.   HENT:   Head: Normocephalic and atraumatic.   Right Ear: Tympanic membrane, external ear and ear canal normal.   Left Ear: Tympanic membrane, external ear and ear canal normal.   Nose: No mucosal edema or rhinorrhea.   Mouth/Throat: Oropharynx is clear and moist. No oropharyngeal exudate or posterior oropharyngeal erythema.   Eyes: Conjunctivae and EOM are normal. Pupils are equal, round, and reactive to light. No scleral icterus.   Neck: Normal range of motion. Neck supple. No JVD " present. No thyromegaly present.   Cardiovascular: Normal rate, regular rhythm and normal heart sounds.    No murmur heard.  Pulmonary/Chest: Effort normal. No respiratory distress. He has decreased breath sounds (bilateral upper lobes). He has wheezes (mild scattered over brochial region). He exhibits no tenderness.   Abdominal: Soft. Bowel sounds are normal. He exhibits no mass. There is no tenderness.   Musculoskeletal: He exhibits no edema.        Right elbow: Tenderness found. Medial epicondyle tenderness noted.        Left knee: He exhibits decreased range of motion and swelling (mild). He exhibits no ecchymosis. Tenderness found. Medial joint line and lateral joint line tenderness noted.        Lumbar back: He exhibits decreased range of motion, tenderness, pain and spasm. He exhibits no swelling.   Gait shuffling with limping noted.  More prominent on right side from behind      Skin Integrity  -  His right foot skin is intact.     Jaquan 's left foot skin is intact. .  Lymphadenopathy:        Head (right side): No submandibular adenopathy present.        Head (left side): No submandibular adenopathy present.     He has no cervical adenopathy.   Neurological: He is alert and oriented to person, place, and time. He has normal reflexes. No cranial nerve deficit. He exhibits normal muscle tone. Coordination normal.   Skin: Skin is warm and dry.   Psychiatric: His speech is normal and behavior is normal. Judgment and thought content normal. His mood appears not anxious. He is not actively hallucinating. Cognition and memory are normal. He exhibits a depressed mood. He expresses no homicidal and no suicidal ideation. He exhibits normal recent memory and normal remote memory. He is attentive.   Vitals reviewed.    Assessment/Plan     Problem List Items Addressed This Visit        Respiratory    Mild intermittent asthma with acute exacerbation    Relevant Medications    ipratropium-albuterol (COMBIVENT RESPIMAT)   MCG/ACT inhaler    clarithromycin (BIAXIN) 500 MG tablet    Other Relevant Orders    XR Chest 2 View (Completed)      Other Visit Diagnoses     Cough    -  Primary      Trial of Combivent.  Understands he may not use albuterol inhaler simeltaneously  Understands disease processes and need for medications.  Understands reasons for urgent and emergent care.  Patient (& family) verbalized agreement for treatment plan.   Instructed to complete all of antibiotics for acute illness.  Increase PO fluids, avoid caffeine.  Do not save meds for later use.  Rest PRN  RTC PRN 3-5 days for worsening or non resolving symptoms         This document has been electronically signed by:  BALDOMERO Thao, FNP-C

## 2017-09-26 ENCOUNTER — APPOINTMENT (OUTPATIENT)
Dept: LAB | Facility: HOSPITAL | Age: 47
End: 2017-09-26

## 2017-09-26 ENCOUNTER — HOSPITAL ENCOUNTER (OUTPATIENT)
Dept: GENERAL RADIOLOGY | Facility: HOSPITAL | Age: 47
Discharge: HOME | End: 2017-09-26
Admitting: ORTHOPAEDIC SURGERY

## 2017-09-26 LAB
ALBUMIN SERPL-MCNC: 4.3 G/DL (ref 3.5–5)
ALBUMIN/GLOB SERPL: 1.2 G/DL (ref 1.5–2.5)
ALP SERPL-CCNC: 79 U/L (ref 40–129)
ALT SERPL W P-5'-P-CCNC: 21 U/L (ref 10–44)
ANION GAP SERPL CALCULATED.3IONS-SCNC: 2.4 MMOL/L (ref 3.6–11.2)
APTT PPP: 25.2 SECONDS (ref 23.8–36.1)
AST SERPL-CCNC: 26 U/L (ref 10–34)
BASOPHILS # BLD AUTO: 0.01 10*3/MM3 (ref 0–0.3)
BASOPHILS NFR BLD AUTO: 0.2 % (ref 0–2)
BILIRUB SERPL-MCNC: 0.4 MG/DL (ref 0.2–1.8)
BILIRUB UR QL STRIP: NEGATIVE
BUN BLD-MCNC: 15 MG/DL (ref 7–21)
BUN/CREAT SERPL: 16 (ref 7–25)
CALCIUM SPEC-SCNC: 9.2 MG/DL (ref 7.7–10)
CHLORIDE SERPL-SCNC: 101 MMOL/L (ref 99–112)
CLARITY UR: CLEAR
CO2 SERPL-SCNC: 30.6 MMOL/L (ref 24.3–31.9)
COLOR UR: YELLOW
CREAT BLD-MCNC: 0.94 MG/DL (ref 0.43–1.29)
DEPRECATED RDW RBC AUTO: 43 FL (ref 37–54)
EOSINOPHIL # BLD AUTO: 0.06 10*3/MM3 (ref 0–0.7)
EOSINOPHIL NFR BLD AUTO: 1 % (ref 0–5)
ERYTHROCYTE [DISTWIDTH] IN BLOOD BY AUTOMATED COUNT: 12.5 % (ref 11.5–14.5)
GFR SERPL CREATININE-BSD FRML MDRD: 87 ML/MIN/1.73
GLOBULIN UR ELPH-MCNC: 3.6 GM/DL
GLUCOSE BLD-MCNC: 99 MG/DL (ref 70–110)
GLUCOSE UR STRIP-MCNC: NEGATIVE MG/DL
HCT VFR BLD AUTO: 44.5 % (ref 42–52)
HGB BLD-MCNC: 15.6 G/DL (ref 14–18)
HGB UR QL STRIP.AUTO: NEGATIVE
IMM GRANULOCYTES # BLD: 0.01 10*3/MM3 (ref 0–0.03)
IMM GRANULOCYTES NFR BLD: 0.2 % (ref 0–0.5)
INR PPP: 0.97 (ref 0.9–1.1)
KETONES UR QL STRIP: NEGATIVE
LEUKOCYTE ESTERASE UR QL STRIP.AUTO: NEGATIVE
LYMPHOCYTES # BLD AUTO: 1.57 10*3/MM3 (ref 1–3)
LYMPHOCYTES NFR BLD AUTO: 25.7 % (ref 21–51)
MCH RBC QN AUTO: 32.9 PG (ref 27–33)
MCHC RBC AUTO-ENTMCNC: 35.1 G/DL (ref 33–37)
MCV RBC AUTO: 93.9 FL (ref 80–94)
MONOCYTES # BLD AUTO: 0.75 10*3/MM3 (ref 0.1–0.9)
MONOCYTES NFR BLD AUTO: 12.3 % (ref 0–10)
MRSA DNA SPEC QL NAA+PROBE: NEGATIVE
NEUTROPHILS # BLD AUTO: 3.71 10*3/MM3 (ref 1.4–6.5)
NEUTROPHILS NFR BLD AUTO: 60.6 % (ref 30–70)
NITRITE UR QL STRIP: NEGATIVE
OSMOLALITY SERPL CALC.SUM OF ELEC: 269.1 MOSM/KG (ref 273–305)
PH UR STRIP.AUTO: 5.5 [PH] (ref 5–8)
PLATELET # BLD AUTO: 166 10*3/MM3 (ref 130–400)
PMV BLD AUTO: 9.9 FL (ref 6–10)
POTASSIUM BLD-SCNC: 4.2 MMOL/L (ref 3.5–5.3)
PROT SERPL-MCNC: 7.9 G/DL (ref 6–8)
PROT UR QL STRIP: NEGATIVE
PROTHROMBIN TIME: 13 SECONDS (ref 11–15.4)
RBC # BLD AUTO: 4.74 10*6/MM3 (ref 4.7–6.1)
S AUREUS DNA SPEC QL NAA+PROBE: POSITIVE
SODIUM BLD-SCNC: 134 MMOL/L (ref 135–153)
SP GR UR STRIP: 1.01 (ref 1–1.03)
UROBILINOGEN UR QL STRIP: NORMAL
WBC NRBC COR # BLD: 6.11 10*3/MM3 (ref 4.5–12.5)

## 2017-09-26 PROCEDURE — 81003 URINALYSIS AUTO W/O SCOPE: CPT | Performed by: ORTHOPAEDIC SURGERY

## 2017-09-26 PROCEDURE — 87640 STAPH A DNA AMP PROBE: CPT | Performed by: ORTHOPAEDIC SURGERY

## 2017-09-26 PROCEDURE — 85610 PROTHROMBIN TIME: CPT | Performed by: ORTHOPAEDIC SURGERY

## 2017-09-26 PROCEDURE — 71020 XR CHEST 2 VW: CPT | Performed by: RADIOLOGY

## 2017-09-26 PROCEDURE — 85730 THROMBOPLASTIN TIME PARTIAL: CPT | Performed by: ORTHOPAEDIC SURGERY

## 2017-09-26 PROCEDURE — 36415 COLL VENOUS BLD VENIPUNCTURE: CPT | Performed by: ORTHOPAEDIC SURGERY

## 2017-09-26 PROCEDURE — 80053 COMPREHEN METABOLIC PANEL: CPT | Performed by: ORTHOPAEDIC SURGERY

## 2017-09-26 PROCEDURE — 87641 MR-STAPH DNA AMP PROBE: CPT | Performed by: ORTHOPAEDIC SURGERY

## 2017-09-26 PROCEDURE — 85025 COMPLETE CBC W/AUTO DIFF WBC: CPT | Performed by: ORTHOPAEDIC SURGERY

## 2017-09-26 PROCEDURE — 71020 HC CHEST PA AND LATERAL: CPT

## 2017-10-02 RX ORDER — TIZANIDINE 4 MG/1
4 TABLET ORAL NIGHTLY PRN
COMMUNITY
End: 2018-01-08

## 2017-10-02 NOTE — PAT
PATIENT RETURNED CALL TO PREADMISSION TESTING AND BEGAN CONVERSATION BY TELLING RN THAT WE SHOULD ALREADY HAVE ALL NEEDED INFORMATION ON FILE.  PATIENT ACTED UPSET THAT RN ASKED WHAT NUMBER HE WAS CALLING PAT FROM AND ASKED WHY WE HAD TO KNOW THIS.  EXPLAINED TO PATIENT THAT WE HAD NOT BEEN ABLE TO REACH HIM AND THAT WE HAD TO DOCUMENT IN THE EVENT THAT WE HAD TO REACH HIM AGAIN.  PATIENT DEMONSTRATED HOSTILITY REGARDING MEDICAL QUESTIONS THAT WERE ASKED.  RN EXPRESSED TO PATIENT THE IMPORTANCE OF OBTAINING A 1030-DETAILED HISTORY PRIOR TO SURGERY TO ENSURE SAFETY TO EACH PATIENT.  PATIENT HAD A VERY HOSTILE TONE DURING CONVERSATION AND DID NOT UNDERSTAND WHY RN ASKED ABOUT WHEN DIET PILLS WERE LAST TAKEN, WHEN NTG WAS LAST TAKEN, WHEN LAST SEIZURE WAS, ETC.  PATIENT REPORTED SEVERAL TIMES THAT HE HAD TO GO AND RN REINFORCED WITH HIM THAT A DETAILED MEDICAL HX HAD TO BE OBTAINED PRIOR TO SURGERY AND IF IT WAS NOT, THAT IT COULD CAUSE A DELAY OR CANCELLATION OF HIS CASE.    AFTER HEALTH HISTORY WAS OBTAINED, PHONED JOHN AT DR CORONADO'S OFFICE AND NOTIFIED HER THAT PATIENT REPORTED LAST TAKING ADIPEX ON 10-01-17 AND THAT HE HAD NOT BEEN TOLD TO STOP THIS MEDICATION.  ALSO DISCUSSED WITH JOHN PATIENT'S TONE OF PREVIOUS CONVERSATION.      1110-SPOKE WITH JOHN WHO REPORTS SHE HAD NOTIFIED DR CORONADO ABOUT PATIENT TAKING ADIPEX AND THAT M.D. SAID THAT HE COULD DO PATIENT UNDER LOCAL ANESTHESIA WITHOUT ANY PROBLEM.  DISCUSSED WITH JOHN THAT RN WOULD CONSULT ANESTHESIA REGARDING THIS AND CALL HER BACK.  ALSO NOTIFIED JOHN OF NEGATIVE MRSA SCREEN FOR 09-26-17, BUT THAT SCREEN WAS POSITIVE [facility].  (MAHESH MARTINEZ, OR  AND KEN SPIVEY, RN INFECTION CONTROL ALSO NOTIFIED).     ATTEMPTED TO PHONE CRNA FOR CONSULT.  CRNA NOT AVAILABLE VIA [facility] PHONE AND OR BACK DESK REPORTED CRNA WAS DOING EPIDURAL UPSTAIRS.  WILL RETRY CRNA LATER.     1133-SPOKE WITH EDNA MUSTAFA CRNA AND REVIEW PATIENT'S MEDICAL  HISTORY, ALONG WITH MEDICATION LIST.  CRNA REPORTED THAT HE FELT DUE TO MULTI-SYSTEM PROBLEMS THAT PATIENT WOULD BE BEST SUITED TO BE OFF OF ADIPEX FOR 2 WEEKS AND FOR HIS SURGERY TO BE RESCHEDULED.  CRNA REPORTED THAT HE WOULD BE GLAD TO SPEAK WITH DR CORONADO IF NEEDED.  NOTIFIED JOHN OF CONVERSATION WITH RAMONA.  JOHN REPORTED THAT SHE WOULD PREFER DR CORONADO SPEAK WITH [facility] AND SHE WAS GIVEN THE NUMBER TO REACH EDNA MUSTAFA CRNA.

## 2017-10-07 ENCOUNTER — HOSPITAL ENCOUNTER (EMERGENCY)
Facility: HOSPITAL | Age: 47
Discharge: HOME | End: 2017-10-07
Attending: INTERNAL MEDICINE | Admitting: NURSE PRACTITIONER

## 2017-10-07 ENCOUNTER — APPOINTMENT (OUTPATIENT)
Dept: GENERAL RADIOLOGY | Facility: HOSPITAL | Age: 47
End: 2017-10-07

## 2017-10-07 VITALS
BODY MASS INDEX: 33.04 KG/M2 | DIASTOLIC BLOOD PRESSURE: 59 MMHG | TEMPERATURE: 97.7 F | HEIGHT: 68 IN | WEIGHT: 218 LBS | RESPIRATION RATE: 16 BRPM | OXYGEN SATURATION: 99 % | HEART RATE: 68 BPM | SYSTOLIC BLOOD PRESSURE: 118 MMHG

## 2017-10-07 DIAGNOSIS — R07.9 CHEST PAIN, UNSPECIFIED TYPE: Primary | ICD-10-CM

## 2017-10-07 LAB
ALBUMIN SERPL-MCNC: 4.7 G/DL (ref 3.5–5)
ALBUMIN/GLOB SERPL: 1.2 G/DL (ref 1.5–2.5)
ALP SERPL-CCNC: 78 U/L (ref 40–129)
ALT SERPL W P-5'-P-CCNC: 29 U/L (ref 10–44)
ANION GAP SERPL CALCULATED.3IONS-SCNC: 6.2 MMOL/L (ref 3.6–11.2)
AST SERPL-CCNC: 41 U/L (ref 10–34)
BASOPHILS # BLD AUTO: 0.01 10*3/MM3 (ref 0–0.3)
BASOPHILS NFR BLD AUTO: 0.1 % (ref 0–2)
BILIRUB SERPL-MCNC: 0.3 MG/DL (ref 0.2–1.8)
BUN BLD-MCNC: 10 MG/DL (ref 7–21)
BUN/CREAT SERPL: 11.4 (ref 7–25)
CALCIUM SPEC-SCNC: 9.5 MG/DL (ref 7.7–10)
CHLORIDE SERPL-SCNC: 105 MMOL/L (ref 99–112)
CK MB SERPL-CCNC: 1.17 NG/ML (ref 0–5)
CO2 SERPL-SCNC: 26.8 MMOL/L (ref 24.3–31.9)
CREAT BLD-MCNC: 0.88 MG/DL (ref 0.43–1.29)
DEPRECATED RDW RBC AUTO: 42 FL (ref 37–54)
EOSINOPHIL # BLD AUTO: 0.09 10*3/MM3 (ref 0–0.7)
EOSINOPHIL NFR BLD AUTO: 1.3 % (ref 0–5)
ERYTHROCYTE [DISTWIDTH] IN BLOOD BY AUTOMATED COUNT: 12.7 % (ref 11.5–14.5)
GFR SERPL CREATININE-BSD FRML MDRD: 94 ML/MIN/1.73
GLOBULIN UR ELPH-MCNC: 4 GM/DL
GLUCOSE BLD-MCNC: 95 MG/DL (ref 70–110)
HCT VFR BLD AUTO: 44.5 % (ref 42–52)
HGB BLD-MCNC: 15.5 G/DL (ref 14–18)
IMM GRANULOCYTES # BLD: 0.01 10*3/MM3 (ref 0–0.03)
IMM GRANULOCYTES NFR BLD: 0.1 % (ref 0–0.5)
LYMPHOCYTES # BLD AUTO: 2.11 10*3/MM3 (ref 1–3)
LYMPHOCYTES NFR BLD AUTO: 29.5 % (ref 21–51)
MCH RBC QN AUTO: 32.6 PG (ref 27–33)
MCHC RBC AUTO-ENTMCNC: 34.8 G/DL (ref 33–37)
MCV RBC AUTO: 93.5 FL (ref 80–94)
MONOCYTES # BLD AUTO: 1.1 10*3/MM3 (ref 0.1–0.9)
MONOCYTES NFR BLD AUTO: 15.4 % (ref 0–10)
NEUTROPHILS # BLD AUTO: 3.84 10*3/MM3 (ref 1.4–6.5)
NEUTROPHILS NFR BLD AUTO: 53.6 % (ref 30–70)
OSMOLALITY SERPL CALC.SUM OF ELEC: 274.5 MOSM/KG (ref 273–305)
PLATELET # BLD AUTO: 186 10*3/MM3 (ref 130–400)
PMV BLD AUTO: 9.7 FL (ref 6–10)
POTASSIUM BLD-SCNC: 4.2 MMOL/L (ref 3.5–5.3)
PROT SERPL-MCNC: 8.7 G/DL (ref 6–8)
RBC # BLD AUTO: 4.76 10*6/MM3 (ref 4.7–6.1)
SODIUM BLD-SCNC: 138 MMOL/L (ref 135–153)
TROPONIN I SERPL-MCNC: <0.006 NG/ML
TROPONIN I SERPL-MCNC: <0.006 NG/ML
WBC NRBC COR # BLD: 7.16 10*3/MM3 (ref 4.5–12.5)

## 2017-10-07 PROCEDURE — 93005 ELECTROCARDIOGRAM TRACING: CPT | Performed by: INTERNAL MEDICINE

## 2017-10-07 PROCEDURE — 71010 XR CHEST 1 VW: CPT | Performed by: RADIOLOGY

## 2017-10-07 PROCEDURE — 96374 THER/PROPH/DIAG INJ IV PUSH: CPT

## 2017-10-07 PROCEDURE — 82553 CREATINE MB FRACTION: CPT | Performed by: NURSE PRACTITIONER

## 2017-10-07 PROCEDURE — 25010000002 MORPHINE PER 10 MG: Performed by: INTERNAL MEDICINE

## 2017-10-07 PROCEDURE — 36415 COLL VENOUS BLD VENIPUNCTURE: CPT

## 2017-10-07 PROCEDURE — 80053 COMPREHEN METABOLIC PANEL: CPT | Performed by: NURSE PRACTITIONER

## 2017-10-07 PROCEDURE — 84484 ASSAY OF TROPONIN QUANT: CPT | Performed by: NURSE PRACTITIONER

## 2017-10-07 PROCEDURE — 96376 TX/PRO/DX INJ SAME DRUG ADON: CPT

## 2017-10-07 PROCEDURE — 99284 EMERGENCY DEPT VISIT MOD MDM: CPT

## 2017-10-07 PROCEDURE — 85025 COMPLETE CBC W/AUTO DIFF WBC: CPT | Performed by: NURSE PRACTITIONER

## 2017-10-07 PROCEDURE — 71010 HC CHEST PA OR AP: CPT

## 2017-10-07 PROCEDURE — 93010 ELECTROCARDIOGRAM REPORT: CPT | Performed by: INTERNAL MEDICINE

## 2017-10-07 RX ORDER — MORPHINE SULFATE 2 MG/ML
INJECTION, SOLUTION INTRAMUSCULAR; INTRAVENOUS
Status: DISCONTINUED
Start: 2017-10-07 | End: 2017-10-08 | Stop reason: HOSPADM

## 2017-10-07 RX ORDER — ASPIRIN 81 MG/1
324 TABLET, CHEWABLE ORAL ONCE
Status: COMPLETED | OUTPATIENT
Start: 2017-10-07 | End: 2017-10-07

## 2017-10-07 RX ORDER — MORPHINE SULFATE 2 MG/ML
2 INJECTION, SOLUTION INTRAMUSCULAR; INTRAVENOUS ONCE
Status: COMPLETED | OUTPATIENT
Start: 2017-10-07 | End: 2017-10-07

## 2017-10-07 RX ADMIN — MORPHINE SULFATE 4 MG: 4 INJECTION, SOLUTION INTRAMUSCULAR; INTRAVENOUS at 21:57

## 2017-10-07 RX ADMIN — ASPIRIN 324 MG: 81 TABLET, CHEWABLE ORAL at 20:55

## 2017-10-07 RX ADMIN — MORPHINE SULFATE 2 MG: 2 INJECTION, SOLUTION INTRAMUSCULAR; INTRAVENOUS at 20:57

## 2017-10-08 NOTE — ED PROVIDER NOTES
Subjective   Patient is a 46 y.o. male presenting with chest pain.   History provided by:  Patient   used: No    Chest Pain   Pain location:  Substernal area and L lateral chest  Pain quality: sharp    Onset quality:  Gradual  Timing:  Constant  Progression:  Worsening  Chronicity:  Recurrent  Context: at rest    Context: not breathing, not drug use, not intercourse and not lifting    Relieved by:  Nothing  Worsened by:  Nothing  Ineffective treatments:  None tried  Associated symptoms: orthopnea and shortness of breath    Associated symptoms: no abdominal pain, no AICD problem, no altered mental status, no anorexia, no back pain, no dizziness, no dysphagia, no fatigue, no fever, no headache, no heartburn, no nausea and no numbness    Risk factors: high cholesterol, hypertension and male sex        Review of Systems   Constitutional: Negative.  Negative for chills, fatigue and fever.   HENT: Negative for nosebleeds and trouble swallowing.    Eyes: Negative.    Respiratory: Positive for shortness of breath. Negative for apnea.    Cardiovascular: Positive for chest pain and orthopnea.   Gastrointestinal: Negative for abdominal distention, abdominal pain, anorexia, diarrhea, heartburn and nausea.   Endocrine: Negative.    Musculoskeletal: Negative for back pain.   Skin: Negative for color change.   Allergic/Immunologic: Negative.    Neurological: Negative.  Negative for dizziness, numbness and headaches.   Hematological: Negative for adenopathy.   Psychiatric/Behavioral: Negative.    All other systems reviewed and are negative.      Past Medical History:   Diagnosis Date   • Allergic    • Anxiety    • Arthritis    • Asthma    • Body piercing     REPORTS CYLICONE IN EARS   • Clotting disorder 2004    had a knee surgery   • Depression    • DVT (deep venous thrombosis)     RIGHT RIGHT KNEE AFTER SURGERY YEARS AGO IN 2001 OR 2004   • Gastric ulcer    • GERD (gastroesophageal reflux disease)    • H/O  "migraine    • H/O sleep apnea     REPORTS DIAGNOSED WITH SLEEP APNEA AND COULDN'T STAND TO WEAR THE MACHINE   • Headache    • Heart attack     REPORTS \"LIGHT HEART ATTACK A LONG TIME AGO\"  \"EARLY 90'S\"   • History of seizures     REPORTS LAST EPISODE WAS AROUND 1995.   • Hostility    • Hyperlipidemia    • Hypertension    • Knee pain, acute     Left   • Low back pain    • Migraine    • MRSA (methicillin resistant Staphylococcus aureus)     REPORTS LAST TESTED + 2004. WAS TREATED HE REPORTS.  RIGHT ARM, RIGHT KNEE.   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    • Ciprofloxacin    • Contrast Dye    • Fish-Derived Products Hives   • Mobic [Meloxicam]    • Penicillins    • Prednisone    • Robitussin Cough+ Chest Max St  [Dextromethorphan-Guaifenesin]    • Sulfa Antibiotics    • Zoloft [Sertraline Hcl] Hives and Itching   • Gabapentin Rash   • Keflex [Cephalexin] Rash   • Metoprolol Rash   • Peanut-Containing Drug Products Rash   • Shrimp (Diagnostic) Rash       Past Surgical History:   Procedure Laterality Date   • BACK SURGERY     • BRAIN SURGERY  1986    Tumor removal    • CARDIAC SURGERY      CARDIAC CATH REPORTED AS 2 MONTHS AGO IN [place]. REPORTS NO STENTS PLACED.   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       Family History   Problem Relation Age of Onset   • Diabetes Mother    • Hypertension Mother    • Stroke Mother    • Diabetes Father    • Skin cancer Father    • Hypertension Father    • Heart attack Father    • Diabetes Brother    • Hypertension Brother    • Heart disease " Maternal Aunt    • Heart disease Maternal Uncle    • Heart disease Paternal Aunt    • Heart disease Paternal Uncle    • Heart disease Maternal Grandmother    • Heart disease Maternal Grandfather    • Heart disease Paternal Grandmother    • Heart disease Paternal Grandfather        Social History     Social History   • Marital status:      Spouse name: N/A   • Number of children: 2   • Years of education: 12     Occupational History   • DISABLED      Social History Main Topics   • Smoking status: Current Every Day Smoker     Packs/day: 1.50     Years: 10.00     Types: Cigars, Cigarettes     Start date: 5/5/2010   • Smokeless tobacco: Never Used   • Alcohol use No   • Drug use: No   • Sexual activity: Defer     Other Topics Concern   • None     Social History Narrative           Objective   Physical Exam   Constitutional: He is oriented to person, place, and time. He appears well-developed and well-nourished.   HENT:   Head: Normocephalic and atraumatic.   Eyes: Conjunctivae and EOM are normal. Pupils are equal, round, and reactive to light.   Neck: Normal range of motion. Neck supple.   Cardiovascular: Normal rate, regular rhythm, normal heart sounds, intact distal pulses and normal pulses.    Pulmonary/Chest: Effort normal and breath sounds normal. No respiratory distress.   Abdominal: Soft. Bowel sounds are normal.   Musculoskeletal: Normal range of motion.   Neurological: He is alert and oriented to person, place, and time.   Psychiatric: He has a normal mood and affect. His behavior is normal. Judgment and thought content normal.   Nursing note and vitals reviewed.      Procedures         ED Course  ED Course            HEART Score  History: Slightly suspicious (+0)  ECG: Non specific repolarization disturbance (+1)  Age: Less than 45 (+0)  Risk Factors: 1 - 2 risk factors (+1)  Troponin: Normal limit or lower (+0)  Total: 2         MDM  Number of Diagnoses or Management Options  Chest pain, unspecified  type:   Diagnosis management comments: I have reviewed the patient's records.  He had a stress test in May of this year and a heart catheter both of which did not reveal any areas of significant stenosis, blockage, ischemia. After treatment he says he feels much better.       Amount and/or Complexity of Data Reviewed  Clinical lab tests: ordered and reviewed  Tests in the radiology section of CPT®: reviewed and ordered  Tests in the medicine section of CPT®: reviewed and ordered    Risk of Complications, Morbidity, and/or Mortality  Presenting problems: moderate  Diagnostic procedures: moderate  Management options: moderate    Patient Progress  Patient progress: improved      Final diagnoses:   Chest pain, unspecified type            Tori Reynoso, APRN  10/07/17 2349       Tori Reynoso, BALDOMERO  10/27/17 2008

## 2017-10-13 ENCOUNTER — OFFICE VISIT (OUTPATIENT)
Dept: FAMILY MEDICINE CLINIC | Facility: CLINIC | Age: 47
End: 2017-10-13

## 2017-10-13 VITALS
BODY MASS INDEX: 33.95 KG/M2 | OXYGEN SATURATION: 97 % | SYSTOLIC BLOOD PRESSURE: 120 MMHG | TEMPERATURE: 98.4 F | WEIGHT: 224 LBS | HEIGHT: 68 IN | DIASTOLIC BLOOD PRESSURE: 75 MMHG | HEART RATE: 79 BPM

## 2017-10-13 DIAGNOSIS — M94.262 CHONDROMALACIA OF LEFT KNEE: Primary | ICD-10-CM

## 2017-10-13 DIAGNOSIS — M54.42 CHRONIC BILATERAL LOW BACK PAIN WITH LEFT-SIDED SCIATICA: ICD-10-CM

## 2017-10-13 DIAGNOSIS — G89.29 CHRONIC BILATERAL LOW BACK PAIN WITH LEFT-SIDED SCIATICA: ICD-10-CM

## 2017-10-13 DIAGNOSIS — Z91.013 PERSONAL HISTORY OF ALLERGY TO SHELLFISH: ICD-10-CM

## 2017-10-13 DIAGNOSIS — I10 ESSENTIAL HYPERTENSION: ICD-10-CM

## 2017-10-13 PROCEDURE — 99214 OFFICE O/P EST MOD 30 MIN: CPT | Performed by: NURSE PRACTITIONER

## 2017-10-13 RX ORDER — CETIRIZINE HYDROCHLORIDE 10 MG/1
10 TABLET ORAL DAILY
COMMUNITY
End: 2018-11-02 | Stop reason: SDUPTHER

## 2017-10-13 RX ORDER — HYDROCODONE BITARTRATE AND ACETAMINOPHEN 7.5; 325 MG/1; MG/1
1 TABLET ORAL 2 TIMES DAILY PRN
Qty: 60 TABLET | Refills: 0 | Status: SHIPPED | OUTPATIENT
Start: 2017-10-13 | End: 2017-11-13 | Stop reason: SDUPTHER

## 2017-10-13 RX ORDER — EPINEPHRINE 0.3 MG/.3ML
INJECTION SUBCUTANEOUS
COMMUNITY
Start: 2017-10-10 | End: 2018-05-02 | Stop reason: SDUPTHER

## 2017-10-13 RX ORDER — DICYCLOMINE HCL 20 MG
TABLET ORAL
COMMUNITY
Start: 2017-10-09 | End: 2017-11-13 | Stop reason: SDUPTHER

## 2017-10-13 NOTE — PROGRESS NOTES
"Subjective   Jaquan Mckay is a 46 y.o. male.     Chief Complaint   Patient presents with   • Allergic Rhinitis   • Osteoarthritis   • Anxiety       History of Present Illness     Knee Pain-continues.  He was unable to have knee surgery as he had not held a medication \"long enough\".  That appt has been rescheduled.   Insomnia-reports he is having significant problems in his apartment trying to breath for a \"mold and odor\" concern.  He has reported the problems to management.  He reports he cannot breath when he has to lay down.  He reports 2-3 hours of sleep and feels \"exhausted.\"  \"no energy\".     CP-reports recent ED visit for CP that had been present for \"4 days\".  He reports \"anxiety attack\".  He contributes the symptom to not sleeping.  He has not had any further CP.  He also reports respiratory changes and SOA due to home complaints.   Left foot pain-has been ongoing for several months.  Negative xray in May.  He reports he has been wearing an ankle support but does not feel it has been useful.  He does plan to talk to ortho when he goes back for his knee.  Not at goal.     The following portions of the patient's history were reviewed and updated as appropriate: allergies, current medications, past family history, past medical history, past social history, past surgical history and problem list.    Review of Systems   Constitutional: Positive for fatigue. Negative for appetite change and fever.   HENT: Positive for sore throat (mostly in the AM). Negative for congestion, ear pain, postnasal drip, rhinorrhea, sinus pressure and tinnitus.    Eyes: Negative for pain and visual disturbance (wears glasses.  recent eye exam with new glasses).   Respiratory: Positive for shortness of breath. Negative for cough, chest tightness and wheezing.    Cardiovascular: Negative for chest pain and palpitations.   Gastrointestinal: Negative for abdominal pain, diarrhea and vomiting.   Genitourinary: Positive for dysuria " "(chronic) and testicular pain (chronic). Negative for hematuria and urgency.   Musculoskeletal: Positive for arthralgias, back pain (with radiation to BLE \"down to ankles\" ) and myalgias.   Neurological: Positive for weakness (BLE), numbness (and tingling with \"electricity\" sensation in his legs) and headaches (improving). Negative for dizziness.   Psychiatric/Behavioral: Negative for dysphoric mood, sleep disturbance and suicidal ideas. The patient is not nervous/anxious.    All other systems reviewed and are negative.      Objective     /75 (BP Location: Left arm, Patient Position: Sitting)  Pulse 79  Temp 98.4 °F (36.9 °C) (Tympanic)   Ht 68\" (172.7 cm)  Wt 224 lb (102 kg)  SpO2 97%  BMI 34.06 kg/m2    Physical Exam   Constitutional: He is oriented to person, place, and time. He appears well-developed and well-nourished.   HENT:   Head: Normocephalic and atraumatic.   Right Ear: Tympanic membrane, external ear and ear canal normal.   Left Ear: Tympanic membrane, external ear and ear canal normal.   Nose: No mucosal edema or rhinorrhea.   Mouth/Throat: Oropharynx is clear and moist. No oropharyngeal exudate or posterior oropharyngeal erythema.   Eyes: Conjunctivae and EOM are normal. Pupils are equal, round, and reactive to light. No scleral icterus.   Neck: Normal range of motion. Neck supple. No JVD present. No thyromegaly present.   Cardiovascular: Normal rate, regular rhythm and normal heart sounds.    No murmur heard.  Pulmonary/Chest: Effort normal. No respiratory distress. He has decreased breath sounds (bilateral upper lobes). He has wheezes (mild scattered over brochial region). He exhibits no tenderness.   Abdominal: Soft. Bowel sounds are normal. He exhibits no mass. There is no tenderness.   Musculoskeletal: He exhibits no edema.        Right elbow: Tenderness found. Medial epicondyle tenderness noted.        Left knee: He exhibits decreased range of motion and swelling (mild). He exhibits " no ecchymosis. Tenderness found. Medial joint line and lateral joint line tenderness noted.        Lumbar back: He exhibits decreased range of motion, tenderness, pain and spasm. He exhibits no swelling.   Gait shuffling with limping noted.  More prominent on right side from behind      Skin Integrity  -  His right foot skin is intact.     Jaquan 's left foot skin is intact. .  Lymphadenopathy:        Head (right side): No submandibular adenopathy present.        Head (left side): No submandibular adenopathy present.     He has no cervical adenopathy.   Neurological: He is alert and oriented to person, place, and time. He has normal reflexes. No cranial nerve deficit. He exhibits normal muscle tone. Coordination normal.   Skin: Skin is warm and dry.   Psychiatric: His speech is normal and behavior is normal. Judgment and thought content normal. His mood appears not anxious. He is not actively hallucinating. Cognition and memory are normal. He exhibits a depressed mood. He expresses no homicidal and no suicidal ideation. He exhibits normal recent memory and normal remote memory. He is attentive.   Vitals reviewed.    Assessment/Plan     Problem List Items Addressed This Visit        Cardiovascular and Mediastinum    Hypertension    Relevant Medications    EPINEPHrine (EPIPEN) 0.3 MG/0.3ML solution auto-injector injection       Nervous and Auditory    Chronic bilateral low back pain with left-sided sciatica    Relevant Medications    HYDROcodone-acetaminophen (NORCO) 7.5-325 MG per tablet       Musculoskeletal and Integument    Chondromalacia, knee - Primary    Overview     Added automatically from request for surgery [id number]           Other Visit Diagnoses     Personal history of allergy to shellfish        Relevant Medications    EPINEPHrine (EPIPEN) 0.3 MG/0.3ML solution auto-injector injection        Advised to follow up with ortho for knee surgery  RX provided for patient to have a WC for mobility after  surgery  CHAVEZ reviewed today and consistent.  Will refill prescribed controlled medication today.  Patient is aware they cannot receive narcotics from any other provider.  Risk and benefits of medication use has been reviewed.  History and physical exam exhibit continued safe and appropriate use of controlled substances.  Emotional support and active listening provided.  Patient provided time to verbalize feelings.  RTC 1 month, sooner if needed.         This document has been electronically signed by:  BALDOMERO Thao, FNP-C

## 2017-10-19 NOTE — PAT
DISCUSSED WITH [facility] CRNA PATIENT'S RECENT VISIT TO THE ER FOR CHEST PAIN. REVIEWED ER NOTES LABS AND CARDIAC TESTING FROM [facility]LI[facility] THIS YEAR. CRNA SAYS GOOD TO GO.

## 2017-10-20 ENCOUNTER — ANESTHESIA EVENT (OUTPATIENT)
Dept: PERIOP | Facility: HOSPITAL | Age: 47
End: 2017-10-20

## 2017-10-20 ENCOUNTER — ANESTHESIA (OUTPATIENT)
Dept: PERIOP | Facility: HOSPITAL | Age: 47
End: 2017-10-20

## 2017-10-20 ENCOUNTER — HOSPITAL ENCOUNTER (OUTPATIENT)
Facility: HOSPITAL | Age: 47
Setting detail: HOSPITAL OUTPATIENT SURGERY
Discharge: HOME | End: 2017-10-20
Attending: ORTHOPAEDIC SURGERY | Admitting: ORTHOPAEDIC SURGERY

## 2017-10-20 VITALS
RESPIRATION RATE: 16 BRPM | HEIGHT: 67 IN | TEMPERATURE: 98.3 F | SYSTOLIC BLOOD PRESSURE: 149 MMHG | HEART RATE: 66 BPM | BODY MASS INDEX: 34.21 KG/M2 | WEIGHT: 218 LBS | DIASTOLIC BLOOD PRESSURE: 89 MMHG

## 2017-10-20 DIAGNOSIS — M94.262 CHONDROMALACIA, KNEE, LEFT: ICD-10-CM

## 2017-10-20 PROBLEM — M94.269 CHONDROMALACIA, KNEE: Status: ACTIVE | Noted: 2017-09-12

## 2017-10-20 PROCEDURE — S0260 H&P FOR SURGERY: HCPCS | Performed by: ORTHOPAEDIC SURGERY

## 2017-10-20 PROCEDURE — 25010000002 PROPOFOL 10 MG/ML EMULSION: Performed by: NURSE ANESTHETIST, CERTIFIED REGISTERED

## 2017-10-20 PROCEDURE — 25010000002 EPINEPHRINE 1 MG/ML SOLUTION: Performed by: ORTHOPAEDIC SURGERY

## 2017-10-20 PROCEDURE — 25010000002 DEXAMETHASONE PER 1 MG: Performed by: NURSE ANESTHETIST, CERTIFIED REGISTERED

## 2017-10-20 PROCEDURE — 25010000002 ONDANSETRON PER 1 MG: Performed by: NURSE ANESTHETIST, CERTIFIED REGISTERED

## 2017-10-20 PROCEDURE — 25010000002 KETOROLAC TROMETHAMINE PER 15 MG: Performed by: NURSE ANESTHETIST, CERTIFIED REGISTERED

## 2017-10-20 PROCEDURE — 25010000002 MIDAZOLAM PER 1 MG: Performed by: NURSE ANESTHETIST, CERTIFIED REGISTERED

## 2017-10-20 PROCEDURE — 25010000002 PROPOFOL 1000 MG/ML EMULSION: Performed by: NURSE ANESTHETIST, CERTIFIED REGISTERED

## 2017-10-20 PROCEDURE — 25010000002 FENTANYL CITRATE (PF) 100 MCG/2ML SOLUTION: Performed by: NURSE ANESTHETIST, CERTIFIED REGISTERED

## 2017-10-20 PROCEDURE — 29877 ARTHRS KNEE SURG DBRDMT/SHVG: CPT | Performed by: ORTHOPAEDIC SURGERY

## 2017-10-20 RX ORDER — SODIUM CHLORIDE 0.9 % (FLUSH) 0.9 %
1-10 SYRINGE (ML) INJECTION AS NEEDED
Status: DISCONTINUED | OUTPATIENT
Start: 2017-10-20 | End: 2017-10-20 | Stop reason: HOSPADM

## 2017-10-20 RX ORDER — EPINEPHRINE 1 MG/ML
INJECTION INTRAMUSCULAR; INTRAVENOUS; SUBCUTANEOUS AS NEEDED
Status: DISCONTINUED | OUTPATIENT
Start: 2017-10-20 | End: 2017-10-20 | Stop reason: HOSPADM

## 2017-10-20 RX ORDER — KETAMINE HYDROCHLORIDE 50 MG/ML
INJECTION, SOLUTION, CONCENTRATE INTRAMUSCULAR; INTRAVENOUS AS NEEDED
Status: DISCONTINUED | OUTPATIENT
Start: 2017-10-20 | End: 2017-10-20 | Stop reason: SURG

## 2017-10-20 RX ORDER — ONDANSETRON 2 MG/ML
INJECTION INTRAMUSCULAR; INTRAVENOUS AS NEEDED
Status: DISCONTINUED | OUTPATIENT
Start: 2017-10-20 | End: 2017-10-20 | Stop reason: SURG

## 2017-10-20 RX ORDER — MIDAZOLAM HYDROCHLORIDE 1 MG/ML
INJECTION INTRAMUSCULAR; INTRAVENOUS AS NEEDED
Status: DISCONTINUED | OUTPATIENT
Start: 2017-10-20 | End: 2017-10-20 | Stop reason: SURG

## 2017-10-20 RX ORDER — OXYCODONE HYDROCHLORIDE AND ACETAMINOPHEN 5; 325 MG/1; MG/1
1 TABLET ORAL EVERY 4 HOURS PRN
Status: DISCONTINUED | OUTPATIENT
Start: 2017-10-20 | End: 2017-10-20 | Stop reason: HOSPADM

## 2017-10-20 RX ORDER — ONDANSETRON 4 MG/1
4 TABLET, FILM COATED ORAL ONCE AS NEEDED
Status: DISCONTINUED | OUTPATIENT
Start: 2017-10-20 | End: 2017-10-20 | Stop reason: HOSPADM

## 2017-10-20 RX ORDER — LIDOCAINE HYDROCHLORIDE AND EPINEPHRINE BITARTRATE 20; .01 MG/ML; MG/ML
INJECTION, SOLUTION SUBCUTANEOUS AS NEEDED
Status: DISCONTINUED | OUTPATIENT
Start: 2017-10-20 | End: 2017-10-20 | Stop reason: HOSPADM

## 2017-10-20 RX ORDER — CLINDAMYCIN PHOSPHATE 900 MG/50ML
900 INJECTION, SOLUTION INTRAVENOUS ONCE
Status: COMPLETED | OUTPATIENT
Start: 2017-10-20 | End: 2017-10-20

## 2017-10-20 RX ORDER — ONDANSETRON 2 MG/ML
4 INJECTION INTRAMUSCULAR; INTRAVENOUS ONCE AS NEEDED
Status: DISCONTINUED | OUTPATIENT
Start: 2017-10-20 | End: 2017-10-20 | Stop reason: HOSPADM

## 2017-10-20 RX ORDER — HYDROCODONE BITARTRATE AND ACETAMINOPHEN 5; 325 MG/1; MG/1
1-2 TABLET ORAL EVERY 6 HOURS PRN
Qty: 20 TABLET | Refills: 0 | Status: SHIPPED | OUTPATIENT
Start: 2017-10-20 | End: 2017-11-13

## 2017-10-20 RX ORDER — PROPOFOL 10 MG/ML
VIAL (ML) INTRAVENOUS AS NEEDED
Status: DISCONTINUED | OUTPATIENT
Start: 2017-10-20 | End: 2017-10-20 | Stop reason: SURG

## 2017-10-20 RX ORDER — SODIUM CHLORIDE, SODIUM LACTATE, POTASSIUM CHLORIDE, CALCIUM CHLORIDE 600; 310; 30; 20 MG/100ML; MG/100ML; MG/100ML; MG/100ML
1000 INJECTION, SOLUTION INTRAVENOUS CONTINUOUS PRN
Status: DISCONTINUED | OUTPATIENT
Start: 2017-10-20 | End: 2017-10-20 | Stop reason: HOSPADM

## 2017-10-20 RX ORDER — FENTANYL CITRATE 50 UG/ML
INJECTION, SOLUTION INTRAMUSCULAR; INTRAVENOUS AS NEEDED
Status: DISCONTINUED | OUTPATIENT
Start: 2017-10-20 | End: 2017-10-20 | Stop reason: SURG

## 2017-10-20 RX ORDER — DEXAMETHASONE SODIUM PHOSPHATE 4 MG/ML
INJECTION, SOLUTION INTRA-ARTICULAR; INTRALESIONAL; INTRAMUSCULAR; INTRAVENOUS; SOFT TISSUE AS NEEDED
Status: DISCONTINUED | OUTPATIENT
Start: 2017-10-20 | End: 2017-10-20 | Stop reason: SURG

## 2017-10-20 RX ORDER — SODIUM CHLORIDE 0.9 % (FLUSH) 0.9 %
3 SYRINGE (ML) INJECTION AS NEEDED
Status: DISCONTINUED | OUTPATIENT
Start: 2017-10-20 | End: 2017-10-20 | Stop reason: HOSPADM

## 2017-10-20 RX ORDER — KETOROLAC TROMETHAMINE 30 MG/ML
INJECTION, SOLUTION INTRAMUSCULAR; INTRAVENOUS AS NEEDED
Status: DISCONTINUED | OUTPATIENT
Start: 2017-10-20 | End: 2017-10-20 | Stop reason: SURG

## 2017-10-20 RX ORDER — BUPIVACAINE HYDROCHLORIDE AND EPINEPHRINE 2.5; 5 MG/ML; UG/ML
INJECTION, SOLUTION EPIDURAL; INFILTRATION; INTRACAUDAL; PERINEURAL AS NEEDED
Status: DISCONTINUED | OUTPATIENT
Start: 2017-10-20 | End: 2017-10-20 | Stop reason: HOSPADM

## 2017-10-20 RX ORDER — CLINDAMYCIN PHOSPHATE 900 MG/50ML
900 INJECTION, SOLUTION INTRAVENOUS
Status: DISCONTINUED | OUTPATIENT
Start: 2017-10-21 | End: 2017-10-20

## 2017-10-20 RX ADMIN — ONDANSETRON 4 MG: 2 INJECTION INTRAMUSCULAR; INTRAVENOUS at 09:45

## 2017-10-20 RX ADMIN — PROPOFOL 50 MG: 10 INJECTION, EMULSION INTRAVENOUS at 09:44

## 2017-10-20 RX ADMIN — SODIUM CHLORIDE, POTASSIUM CHLORIDE, SODIUM LACTATE AND CALCIUM CHLORIDE 1000 ML: 600; 310; 30; 20 INJECTION, SOLUTION INTRAVENOUS at 07:43

## 2017-10-20 RX ADMIN — LIDOCAINE HYDROCHLORIDE 80 MG: 20 INJECTION, SOLUTION INTRAVENOUS at 09:44

## 2017-10-20 RX ADMIN — MIDAZOLAM HYDROCHLORIDE 2 MG: 1 INJECTION, SOLUTION INTRAMUSCULAR; INTRAVENOUS at 09:40

## 2017-10-20 RX ADMIN — PROPOFOL 50 MG: 10 INJECTION, EMULSION INTRAVENOUS at 09:50

## 2017-10-20 RX ADMIN — CLINDAMYCIN PHOSPHATE 900 MG: 900 INJECTION, SOLUTION INTRAVENOUS at 09:38

## 2017-10-20 RX ADMIN — FENTANYL CITRATE 100 MCG: 50 INJECTION, SOLUTION INTRAMUSCULAR; INTRAVENOUS at 09:40

## 2017-10-20 RX ADMIN — KETOROLAC TROMETHAMINE 30 MG: 30 INJECTION, SOLUTION INTRAMUSCULAR at 09:45

## 2017-10-20 RX ADMIN — KETAMINE HYDROCHLORIDE 10 MG: 50 INJECTION, SOLUTION INTRAMUSCULAR; INTRAVENOUS at 09:44

## 2017-10-20 RX ADMIN — PROPOFOL 140 MCG/KG/MIN: 10 INJECTION, EMULSION INTRAVENOUS at 09:45

## 2017-10-20 RX ADMIN — DEXAMETHASONE SODIUM PHOSPHATE 8 MG: 4 INJECTION, SOLUTION INTRAMUSCULAR; INTRAVENOUS at 09:45

## 2017-10-20 NOTE — H&P
Patient ID: Jaquan Mckay is a 46 y.o. male  Follow-up of the Left Knee               History of Present Illness     Continued constant burning left anterior knee pain prevents him from bending twisting lifting with swelling and tightness, therapy and injections have not helped, MR in the past has shown probable chondromalacia changes.  He would like to discuss surgery due to his failure to improve in constant pain.  He has a history of her right knee arthroscopic treatment with relief of anterior knee pain which is of a similar nature many years ago.     Review of Systems   Constitutional: Negative for diaphoresis, fever and unexpected weight change.   HENT: Negative for dental problem and sore throat.    Eyes: Negative for visual disturbance.   Respiratory: Negative for shortness of breath.    Cardiovascular: Negative for chest pain.   Gastrointestinal: Negative for abdominal pain, constipation, diarrhea, nausea and vomiting.   Genitourinary: Negative for difficulty urinating and frequency.   Musculoskeletal: Positive for arthralgias.   Neurological: Negative for headaches.   Hematological: Does not bruise/bleed easily.   All other systems reviewed and are negative.         Medical History         Past Medical History:   Diagnosis Date   • Allergic     • Anxiety     • Arthritis     • Asthma     • Clotting disorder 2004     had a knee surgery   • Depression     • Gastric ulcer     • GERD (gastroesophageal reflux disease)     • H/O migraine     • H/O sleep apnea     • Headache     • Heart attack     • History of epilepsy     • History of seizures     • Hyperlipidemia     • Hypertension     • Knee pain, acute       Left   • Low back pain     • Migraine     • Obesity     • Carl Mountain spotted fever     • Seizures               Surgical History          Past Surgical History:   Procedure Laterality Date   • BACK SURGERY       • BRAIN SURGERY   1986     Tumor removal    • CHOLECYSTECTOMY       • KNEE SURGERY Right    "  • TUMOR EXCISION         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                  Family History   Problem Relation Age of Onset   • Diabetes Mother     • Hypertension Mother     • Stroke Mother     • Diabetes Father     • Skin cancer Father     • Hypertension Father     • Heart attack Father     • Diabetes Brother     • Hypertension Brother     • Heart disease Maternal Aunt     • Heart disease Maternal Uncle     • Heart disease Paternal Aunt     • Heart disease Paternal Uncle     • Heart disease Maternal Grandmother     • Heart disease Maternal Grandfather     • Heart disease Paternal Grandmother     • Heart disease Paternal Grandfather            Social History    Social History            Social History   • Marital status:        Spouse name: N/A   • Number of children: 2   • Years of education: 12           Occupational History   • DISABLED              Social History Main Topics   • Smoking status: Current Every Day Smoker       Packs/day: 2.00       Types: Cigars, Cigarettes       Start date: 5/5/2010   • Smokeless tobacco: Never Used   • Alcohol use No   • Drug use: No   • Sexual activity: Defer           Other Topics Concern   • Not on file      Social History Narrative            All the above social hx, family hx, surgical history,medications, allergies, ros & HPI reviewed.           Allergies   Allergen Reactions   • Paxil [Paroxetine Hcl] Shortness Of Breath       Chest pain    • Isosorbide Nitrate Rash       Rash, hives, had to use inhaler.    • Ciprofloxacin     • Contrast Dye     • Mobic [Meloxicam]     • Penicillins     • Prednisone     • Robitussin Cough+ Chest Max St  [Dextromethorphan-Guaifenesin]     • Sulfa Antibiotics     • Zoloft [Sertraline Hcl] Hives and Itching   • Gabapentin Rash   • Keflex [Cephalexin] Rash   • Metoprolol Rash            Objective       Resp 16  Ht 67\" (170.2 cm)  Wt 232 lb (105 kg)  BMI 36.34 kg/m2   Physical Exam  Constitutional: He is oriented to " person, place, and time. He appears well-developed and well-nourished.   HENT:   Head: Normocephalic and atraumatic.   Eyes: EOM are normal. Pupils are equal, round, and reactive to light.   Neck: Normal range of motion. Neck supple.   Cardiovascular: Normal rate.    Pulmonary/Chest: Effort normal and breath sounds normal.   Abdominal: Soft.   Neurological: He is alert and oriented to person, place, and time.   Skin: Skin is warm and dry.   Psychiatric: He has a normal mood and affect.   Nursing note and vitals reviewed.         Ortho Exam            Assessment/Plan      Review of Radiographic Studies:    No new imaging done today.        Procedures      Jaquan was seen today for follow-up.     Diagnoses and all orders for this visit:     Chondromalacia, knee, left     Orthopedic activities reviewed and patient expressed appreciation and Risk, benefits, and merits of treatment alternatives reviewed with the patient and questions answered        Recommendations/Plan:   Work/Activity Status: May perform usual activities as tolerated     Patient agreeable to call or return sooner for any concerns.           I discussed with the patient the diagnosis, condition, natural history and treatment alternatives, both surgical and nonsurgical.  I reviewed the surgical procedural details using models, diagrams and reviewing x-ray findings.  I explained the nature of the operation, anesthesia methods and the postoperative recovery.  I explained risks and complications including but not limited to infection, bleeding, blood clotting, poor healing, chronic pain, stiffness, failure of the procedure, possible recurrence of the condition and anesthetic related risks.  The patient had opportunity to ask questions which were all answered to their satisfaction and decided to proceed with the plan for surgery.  I believe informed consent to proceed with the surgery was given verbally in my presence today.  The surgical consent form will  be signed in the presence of the nursing staff prior to the surgery.     Impression:  Left anterior knee pain probable chondromalacia patella  Plan:  Diagnostic arthroscopy left knee with chondroplasty or other procedures as indicated     On 10-17 discussed with the anesthesia department patient taking weight loss medications and advised patient he needs to be finished with his weight loss medications 2 weeks prior to surgery.

## 2017-10-20 NOTE — PLAN OF CARE
Problem: Perioperative Period (Adult)  Goal: Signs and Symptoms of Listed Potential Problems Will be Absent or Manageable (Perioperative Period)  Outcome: Ongoing (interventions implemented as appropriate)    10/20/17 0707   Perioperative Period   Problems Assessed (Perioperative Period) all   Problems Present (Perioperative Period) none

## 2017-10-20 NOTE — ANESTHESIA PREPROCEDURE EVALUATION
Anesthesia Evaluation     Patient summary reviewed and Nursing notes reviewed   no history of anesthetic complications:  NPO Solid Status: > 8 hours  NPO Liquid Status: > 8 hours     Airway   Mallampati: II  TM distance: <3 FB  no difficulty expected and possible difficult intubation  Dental - normal exam   (+) edentulous    Pulmonary - normal exam   (+) a smoker (1-2ppd x 13 years.) Current Abstained day of surgery, asthma ( mild used inhaler greater than 1 month), sleep apnea (Non compliant with CPAP),   Cardiovascular - normal exam    ECG reviewed  Rhythm: regular  Rate: normal    (+) hypertension, past MI ( mi in 1990)  >12 months, CAD, dysrhythmias, angina, BARR, DVT resolved, hyperlipidemia    ROS comment: 06\207 Stress test  · Findings consistent with a normal ECG stress test.  · Normal LV cavity size. Normal LV wall motion noted.  · Myocardial perfusion imaging indicates a small-sized, mild degree of ischemia located in the inferior wall.  · Left ventricular ejection fraction is normal (Calculated EF = 64%).  · Impressions are consistent with a low to intermediate risk study.    Neuro/Psych  (+) seizures (1998 last sz well controlled) well controlled, headaches, psychiatric history Anxiety and Depression, poor historian.,    GI/Hepatic/Renal/Endo    (+) obesity (BMI 34), morbid obesity, GERD well controlled,     Musculoskeletal     (+) chronic pain,   Abdominal  - normal exam  (+) obese,     Abdomen: soft.  Bowel sounds: normal.   Substance History      OB/GYN negative ob/gyn ROS         Other   (+) arthritis     ROS/Med Hx Other: Off adipex x 2 weeks  Seasonal allergies  Lab reviewed  ekg sr with sa  cxr nad  Cath 3 months ago neg per pt.  Echo 5/17 lv wnl ef 60%                          Anesthesia Plan    ASA 3     MAC   (Risks and benefits discussed including risk of aspiration, recall and dental damage. All patient questions answered. Will continue with POC.    Local field infiltration of knee by surgeon  with IV sedation    Postoperative single shot AC PNB if needed)  intravenous induction   Anesthetic plan and risks discussed with patient.

## 2017-10-20 NOTE — ANESTHESIA POSTPROCEDURE EVALUATION
Patient: Jaquan Mckay    Procedure Summary     Date Anesthesia Start Anesthesia Stop Room / Location    10/20/17 0938   [facility] /  [facility]       Procedure Diagnosis Surgeon Provider    Diagnostic arthroscopy left knee with chondroplasty (Left Knee) Chondromalacia, knee, left  (Chondromalacia, knee, left [M94.262]) MD Ac Perez CRNA          Anesthesia Type: MAC  Last vitals  BP   148/86 (10/20/17 0731)   Temp   97.2 °F (36.2 °C) (10/20/17 0731)   Pulse   85 (10/20/17 0731)   Resp   16 (10/20/17 0731)     SpO2   100 % (10/20/17 0731)     Post Anesthesia Care and Evaluation    Patient location during evaluation: PHASE II  Patient participation: complete - patient participated  Level of consciousness: awake and alert  Pain score: 0  Pain management: satisfactory to patient  Airway patency: patent  Anesthetic complications: No anesthetic complications  PONV Status: none  Cardiovascular status: acceptable and stable  Respiratory status: acceptable and nasal cannula  Hydration status: acceptable

## 2017-10-20 NOTE — DISCHARGE INSTRUCTIONS
Please follow all post op instructions and follow up appointment time from your physician's office included in your discharge packet.  .   Rest today  No pushing,pulling,tugging,heavy lifting, or strenuous activity   No major decision making,driving,or drinking alcoholic beverages for 24 hours due to the sedation you received  Always use good hand hygiene/washing technique  No driving on pain medication.    To assist you in voiding:  Drink plenty of fluids  Listen to running water while attempting to void.    If you are unable to urinate and you have an uncomfortable urge to void or it has been   6 hours since you were discharged, return to the Emergency Room.    Keep left leg elevated and use ice pack as directed.  Do not use ice continuously.  Apply for 20-30 minutes, remove, and reapply in 2-3 hours.

## 2017-10-25 ENCOUNTER — HOSPITAL ENCOUNTER (EMERGENCY)
Facility: HOSPITAL | Age: 47
Discharge: HOME | End: 2017-10-25
Attending: EMERGENCY MEDICINE | Admitting: EMERGENCY MEDICINE

## 2017-10-25 VITALS
WEIGHT: 218 LBS | RESPIRATION RATE: 16 BRPM | SYSTOLIC BLOOD PRESSURE: 101 MMHG | OXYGEN SATURATION: 100 % | BODY MASS INDEX: 34.21 KG/M2 | HEIGHT: 67 IN | DIASTOLIC BLOOD PRESSURE: 60 MMHG | HEART RATE: 85 BPM | TEMPERATURE: 98.2 F

## 2017-10-25 DIAGNOSIS — G44.209 TENSION HEADACHE: Primary | ICD-10-CM

## 2017-10-25 LAB
ALBUMIN SERPL-MCNC: 4.5 G/DL (ref 3.5–5)
ALBUMIN/GLOB SERPL: 1.2 G/DL (ref 1.5–2.5)
ALP SERPL-CCNC: 87 U/L (ref 40–129)
ALT SERPL W P-5'-P-CCNC: 35 U/L (ref 10–44)
ANION GAP SERPL CALCULATED.3IONS-SCNC: 4.8 MMOL/L (ref 3.6–11.2)
AST SERPL-CCNC: 27 U/L (ref 10–34)
BASOPHILS # BLD AUTO: 0.01 10*3/MM3 (ref 0–0.3)
BASOPHILS NFR BLD AUTO: 0.1 % (ref 0–2)
BILIRUB SERPL-MCNC: 0.3 MG/DL (ref 0.2–1.8)
BUN BLD-MCNC: 15 MG/DL (ref 7–21)
BUN/CREAT SERPL: 16 (ref 7–25)
CALCIUM SPEC-SCNC: 9.8 MG/DL (ref 7.7–10)
CHLORIDE SERPL-SCNC: 104 MMOL/L (ref 99–112)
CO2 SERPL-SCNC: 28.2 MMOL/L (ref 24.3–31.9)
CREAT BLD-MCNC: 0.94 MG/DL (ref 0.43–1.29)
DEPRECATED RDW RBC AUTO: 42.9 FL (ref 37–54)
EOSINOPHIL # BLD AUTO: 0.12 10*3/MM3 (ref 0–0.7)
EOSINOPHIL NFR BLD AUTO: 1.8 % (ref 0–5)
ERYTHROCYTE [DISTWIDTH] IN BLOOD BY AUTOMATED COUNT: 12.9 % (ref 11.5–14.5)
GFR SERPL CREATININE-BSD FRML MDRD: 87 ML/MIN/1.73
GLOBULIN UR ELPH-MCNC: 3.8 GM/DL
GLUCOSE BLD-MCNC: 105 MG/DL (ref 70–110)
HCT VFR BLD AUTO: 44.8 % (ref 42–52)
HGB BLD-MCNC: 16.1 G/DL (ref 14–18)
IMM GRANULOCYTES # BLD: 0.01 10*3/MM3 (ref 0–0.03)
IMM GRANULOCYTES NFR BLD: 0.1 % (ref 0–0.5)
LYMPHOCYTES # BLD AUTO: 1.93 10*3/MM3 (ref 1–3)
LYMPHOCYTES NFR BLD AUTO: 28.5 % (ref 21–51)
MCH RBC QN AUTO: 33.4 PG (ref 27–33)
MCHC RBC AUTO-ENTMCNC: 35.9 G/DL (ref 33–37)
MCV RBC AUTO: 92.9 FL (ref 80–94)
MONOCYTES # BLD AUTO: 0.93 10*3/MM3 (ref 0.1–0.9)
MONOCYTES NFR BLD AUTO: 13.7 % (ref 0–10)
NEUTROPHILS # BLD AUTO: 3.77 10*3/MM3 (ref 1.4–6.5)
NEUTROPHILS NFR BLD AUTO: 55.8 % (ref 30–70)
OSMOLALITY SERPL CALC.SUM OF ELEC: 275 MOSM/KG (ref 273–305)
PHENYTOIN SERPL-MCNC: 21.7 MCG/ML (ref 10–20)
PLATELET # BLD AUTO: 182 10*3/MM3 (ref 130–400)
PMV BLD AUTO: 10.2 FL (ref 6–10)
POTASSIUM BLD-SCNC: 3.9 MMOL/L (ref 3.5–5.3)
PROT SERPL-MCNC: 8.3 G/DL (ref 6–8)
RBC # BLD AUTO: 4.82 10*6/MM3 (ref 4.7–6.1)
SODIUM BLD-SCNC: 137 MMOL/L (ref 135–153)
WBC NRBC COR # BLD: 6.77 10*3/MM3 (ref 4.5–12.5)

## 2017-10-25 PROCEDURE — 85025 COMPLETE CBC W/AUTO DIFF WBC: CPT | Performed by: EMERGENCY MEDICINE

## 2017-10-25 PROCEDURE — 80185 ASSAY OF PHENYTOIN TOTAL: CPT | Performed by: EMERGENCY MEDICINE

## 2017-10-25 PROCEDURE — 99283 EMERGENCY DEPT VISIT LOW MDM: CPT

## 2017-10-25 PROCEDURE — 80053 COMPREHEN METABOLIC PANEL: CPT | Performed by: EMERGENCY MEDICINE

## 2017-10-25 NOTE — ED PROVIDER NOTES
"Subjective   Patient is a 46 y.o. male presenting with headaches.   History provided by:  Patient  Headache   Pain location:  Frontal  Quality:  Unable to specify  Radiates to:  Does not radiate  Severity currently:  4/10  Severity at highest:  4/10  Onset quality:  Gradual  Progression:  Worsening  Chronicity:  Recurrent  Similar to prior headaches: yes    Context: activity    Relieved by:  Nothing  Ineffective treatments:  None tried  Associated symptoms: eye pain and facial pain    Associated symptoms: no abdominal pain and no fever        Review of Systems   Constitutional: Negative.  Negative for fever.   Eyes: Positive for pain.   Respiratory: Negative.    Cardiovascular: Negative.  Negative for chest pain.   Gastrointestinal: Negative.  Negative for abdominal pain.   Endocrine: Negative.    Genitourinary: Negative.  Negative for dysuria.   Skin: Negative.    Neurological: Positive for headaches.   Psychiatric/Behavioral: Negative.    All other systems reviewed and are negative.      Past Medical History:   Diagnosis Date   • Allergic    • Anxiety    • Arthritis    • Asthma    • Body piercing     REPORTS CYLICONE IN EARS   • Clotting disorder 2004    had a knee surgery   • Depression    • DVT (deep venous thrombosis)     RIGHT RIGHT KNEE AFTER SURGERY YEARS AGO IN 2001 OR 2004   • Gastric ulcer    • GERD (gastroesophageal reflux disease)    • H/O migraine    • H/O sleep apnea     REPORTS DIAGNOSED WITH SLEEP APNEA AND COULDN'T STAND TO WEAR THE MACHINE   • Headache    • Heart attack     REPORTS \"LIGHT HEART ATTACK A LONG TIME AGO\"  \"EARLY 90'S\"   • History of seizures     REPORTS LAST EPISODE WAS AROUND 1995.   • Hostility    • Hyperlipidemia    • Hypertension    • Knee pain, acute     Left   • Low back pain    • Migraine    • MRSA (methicillin resistant Staphylococcus aureus)     REPORTS LAST TESTED + 2004. WAS TREATED HE REPORTS.  RIGHT ARM, RIGHT KNEE.   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    • Ciprofloxacin    • Contrast Dye    • Fish-Derived Products Hives   • Mobic [Meloxicam]    • Penicillins    • Prednisone    • Robitussin Cough+ Chest Max St  [Dextromethorphan-Guaifenesin]    • Sulfa Antibiotics    • Zoloft [Sertraline Hcl] Hives and Itching   • Gabapentin Rash   • Keflex [Cephalexin] Rash   • Metoprolol Rash   • Peanut-Containing Drug Products Rash   • Shrimp (Diagnostic) Rash       Past Surgical History:   Procedure Laterality Date   • BACK SURGERY     • BRAIN SURGERY  1986    Tumor removal    • CARDIAC SURGERY      CARDIAC CATH REPORTED AS 2 MONTHS AGO IN [place]. REPORTS NO STENTS PLACED.   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       Family History   Problem Relation Age of Onset   • Diabetes Mother    • Hypertension Mother    • Stroke Mother    • Diabetes Father    • Skin cancer Father    • Hypertension Father    • Heart attack Father    • Diabetes Brother    • Hypertension Brother    • Heart disease Maternal Aunt    • Heart disease Maternal Uncle    • Heart disease Paternal Aunt    • Heart disease Paternal Uncle    • Heart disease Maternal Grandmother    • Heart disease Maternal Grandfather    • Heart disease Paternal Grandmother    • Heart disease Paternal Grandfather        Social History     Social History   • Marital status:      Spouse name: N/A   • Number of children: 2   • Years of  education: 12     Occupational History   • DISABLED      Social History Main Topics   • Smoking status: Current Every Day Smoker     Packs/day: 2.00     Years: 10.00     Types: Cigars, Cigarettes     Start date: 5/5/2010   • Smokeless tobacco: Never Used   • Alcohol use No   • Drug use: No   • Sexual activity: Defer     Other Topics Concern   • None     Social History Narrative   • None           Objective   Physical Exam   Constitutional: He is oriented to person, place, and time. He appears well-developed and well-nourished. No distress.   HENT:   Head: Normocephalic and atraumatic.   Right Ear: External ear normal.   Left Ear: External ear normal.   Nose: Nose normal.   Eyes: Conjunctivae and EOM are normal. Pupils are equal, round, and reactive to light.   Neck: Normal range of motion. Neck supple. No JVD present. No tracheal deviation present.   Cardiovascular: Normal rate, regular rhythm and normal heart sounds.    No murmur heard.  Pulmonary/Chest: Effort normal and breath sounds normal. No respiratory distress. He has no wheezes.   Abdominal: Soft. Bowel sounds are normal. There is no tenderness.   Musculoskeletal: Normal range of motion. He exhibits no edema or deformity.   Neurological: He is alert and oriented to person, place, and time. No cranial nerve deficit.   Skin: Skin is warm and dry. No rash noted. He is not diaphoretic. No erythema. No pallor.   Psychiatric: He has a normal mood and affect. His behavior is normal. Thought content normal.   Nursing note and vitals reviewed.      Procedures         ED Course  ED Course                  MDM    Final diagnoses:   Tension headache            Jerry Dwyer MD  10/25/17 1969

## 2017-11-01 DIAGNOSIS — I10 ESSENTIAL HYPERTENSION: ICD-10-CM

## 2017-11-01 DIAGNOSIS — K21.9 GASTROESOPHAGEAL REFLUX DISEASE WITHOUT ESOPHAGITIS: ICD-10-CM

## 2017-11-01 DIAGNOSIS — K58.9 IRRITABLE BOWEL SYNDROME WITHOUT DIARRHEA: ICD-10-CM

## 2017-11-01 RX ORDER — LISINOPRIL 20 MG/1
TABLET ORAL
Qty: 30 TABLET | Refills: 5 | Status: SHIPPED | OUTPATIENT
Start: 2017-11-01 | End: 2018-06-01 | Stop reason: SDUPTHER

## 2017-11-01 RX ORDER — DICYCLOMINE HCL 20 MG
TABLET ORAL
Qty: 90 TABLET | Refills: 5 | Status: SHIPPED | OUTPATIENT
Start: 2017-11-01 | End: 2018-05-04

## 2017-11-01 RX ORDER — PANTOPRAZOLE SODIUM 40 MG/1
TABLET, DELAYED RELEASE ORAL
Qty: 30 TABLET | Refills: 5 | Status: SHIPPED | OUTPATIENT
Start: 2017-11-01 | End: 2018-06-01 | Stop reason: SDUPTHER

## 2017-11-02 ENCOUNTER — OFFICE VISIT (OUTPATIENT)
Dept: ORTHOPEDIC SURGERY | Facility: CLINIC | Age: 47
End: 2017-11-02

## 2017-11-02 VITALS — BODY MASS INDEX: 35.22 KG/M2 | WEIGHT: 224.4 LBS | HEIGHT: 67 IN | RESPIRATION RATE: 16 BRPM

## 2017-11-02 DIAGNOSIS — M94.262 CHONDROMALACIA, KNEE, LEFT: Primary | ICD-10-CM

## 2017-11-02 PROCEDURE — 99024 POSTOP FOLLOW-UP VISIT: CPT | Performed by: ORTHOPAEDIC SURGERY

## 2017-11-02 NOTE — PROGRESS NOTES
"Subjective   Patient ID: Jaquan Mckay is a 46 y.o. male  Post-op of the Left Knee and Suture / Staple Removal             History of Present Illness    Status post left knee arthroscopy chondromalacia trochlea status post chondroplasty no swelling in the ankle numbness tingling or neurovascular complaints    Review of Systems   Constitutional: Negative for diaphoresis, fever and unexpected weight change.   HENT: Negative for dental problem and sore throat.    Eyes: Negative for visual disturbance.   Respiratory: Negative for shortness of breath.    Cardiovascular: Negative for chest pain.   Gastrointestinal: Negative for abdominal pain, constipation, diarrhea, nausea and vomiting.   Genitourinary: Negative for difficulty urinating and frequency.   Musculoskeletal: Positive for arthralgias.   Neurological: Negative for headaches.   Hematological: Does not bruise/bleed easily.   All other systems reviewed and are negative.      Past Medical History:   Diagnosis Date   • Allergic    • Anxiety    • Arthritis    • Asthma    • Body piercing     REPORTS CYLICONE IN EARS   • Clotting disorder 2004    had a knee surgery   • Depression    • DVT (deep venous thrombosis)     RIGHT RIGHT KNEE AFTER SURGERY YEARS AGO IN 2001 OR 2004   • Gastric ulcer    • GERD (gastroesophageal reflux disease)    • H/O migraine    • H/O sleep apnea     REPORTS DIAGNOSED WITH SLEEP APNEA AND COULDN'T STAND TO WEAR THE MACHINE   • Headache    • Heart attack     REPORTS \"LIGHT HEART ATTACK A LONG TIME AGO\"  \"EARLY 90'S\"   • History of seizures     REPORTS LAST EPISODE WAS AROUND 1995.   • Hostility    • Hyperlipidemia    • Hypertension    • Knee pain, acute     Left   • Low back pain    • Migraine    • MRSA (methicillin resistant Staphylococcus aureus)     REPORTS LAST TESTED + 2004. WAS TREATED HE REPORTS.  RIGHT ARM, RIGHT KNEE.   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    • Obesity    • Poor historian    • [place] spotted fever    • Tattoo    • Wears glasses  "        Past Surgical History:   Procedure Laterality Date   • BACK SURGERY     • BRAIN SURGERY  1986    Tumor removal    • CARDIAC SURGERY      CARDIAC CATH REPORTED AS 2 MONTHS AGO IN [place]. REPORTS NO STENTS PLACED.   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       Family History   Problem Relation Age of Onset   • Diabetes Mother    • Hypertension Mother    • Stroke Mother    • Diabetes Father    • Skin cancer Father    • Hypertension Father    • Heart attack Father    • Diabetes Brother    • Hypertension Brother    • Heart disease Maternal Aunt    • Heart disease Maternal Uncle    • Heart disease Paternal Aunt    • Heart disease Paternal Uncle    • Heart disease Maternal Grandmother    • Heart disease Maternal Grandfather    • Heart disease Paternal Grandmother    • Heart disease Paternal Grandfather        Social History     Social History   • Marital status:      Spouse name: N/A   • Number of children: 2   • Years of education: 12     Occupational History   • DISABLED      Social History Main Topics   • Smoking status: Current Every Day Smoker     Packs/day: 2.00     Years: 10.00     Types: Cigars, Cigarettes     Start date: 5/5/2010   • Smokeless tobacco: Never Used   • Alcohol use No   • Drug use: No   • Sexual activity: Defer     Other Topics Concern   • Not on file     Social History Narrative       I have reviewed all of the above social hx, family hx, surgical hx, medications, allergies & ROS and confirm that it is accurate.    Allergies   Allergen Reactions   • Paxil [Paroxetine Hcl] Shortness Of Breath     Chest pain    •  "Isosorbide Nitrate Rash     Rash, hives, had to use inhaler.    • Ciprofloxacin    • Contrast Dye    • Fish-Derived Products Hives   • Mobic [Meloxicam]    • Penicillins    • Prednisone    • Robitussin Cough+ Chest Max St  [Dextromethorphan-Guaifenesin]    • Sulfa Antibiotics    • Zoloft [Sertraline Hcl] Hives and Itching   • Gabapentin Rash   • Keflex [Cephalexin] Rash   • Metoprolol Rash   • Peanut-Containing Drug Products Rash   • Shrimp (Diagnostic) Rash       Objective   Resp 16  Ht 67\" (170.2 cm)  Wt 224 lb 6.4 oz (102 kg)  BMI 35.15 kg/m2   Physical Exam  Constitutional: He is oriented to person, place, and time. He appears well-developed and well-nourished.   HENT:   Head: Normocephalic and atraumatic.   Eyes: EOM are normal. Pupils are equal, round, and reactive to light.   Neck: Normal range of motion. Neck supple.   Cardiovascular: Normal rate.    Pulmonary/Chest: Effort normal and breath sounds normal.   Abdominal: Soft.   Neurological: He is alert and oriented to person, place, and time.   Skin: Skin is warm and dry.   Psychiatric: He has a normal mood and affect.   Nursing note and vitals reviewed.       Ortho Exam     Incisions well-healed sutures removed Steri-Strips applied calf supple neurovascularly intact  Assessment/Plan   Review of Radiographic Studies:    No new imaging done today.      Procedures     Jaquan was seen today for suture / staple removal and post-op.    Diagnoses and all orders for this visit:    Chondromalacia, knee, left     Physical therapy referral given      Recommendations/Plan:   Work/Activity Status: May perform usual activities as tolerated    Patient agreeable to call or return sooner for any concerns.             Impression:  Status post left knee chondroplasty trochlea chondromalacia patellofemoral compartment left knee  Plan:  Physical therapy recheck 6 weeks no x-rays necessary at that time  "

## 2017-11-07 ENCOUNTER — HOSPITAL ENCOUNTER (OUTPATIENT)
Dept: PHYSICAL THERAPY | Facility: HOSPITAL | Age: 47
Setting detail: THERAPIES SERIES
Discharge: HOME | End: 2017-11-07

## 2017-11-07 DIAGNOSIS — M94.262 CHONDROMALACIA OF LEFT KNEE: Primary | ICD-10-CM

## 2017-11-07 PROCEDURE — 97163 PT EVAL HIGH COMPLEX 45 MIN: CPT | Performed by: PHYSICAL THERAPIST

## 2017-11-07 NOTE — THERAPY EVALUATION
"    Outpatient Physical Therapy Ortho Initial Evaluation   [place]     Patient Name: Jaquan Mckay  : 1970  MRN: [id number]  Today's Date: 2017      Visit Date: 2017    Patient Active Problem List   Diagnosis   • GERD (gastroesophageal reflux disease)   • Arthritis   • Hypertension   • Seizure disorder   • Hyperlipidemia   • Anxiety   • Varicocele   • Varicocele present on ultrasound of scrotum   • Obesity (BMI 30.0-34.9)   • Chronic bilateral low back pain with left-sided sciatica   • Seasonal allergic rhinitis due to pollen   • Mild intermittent asthma with acute exacerbation   • Precordial pain   • Dysuria   • Congenital coronary artery anomaly   • BMI 35.0-35.9,adult   • Chondromalacia, knee        Past Medical History:   Diagnosis Date   • Allergic    • Anxiety    • Arthritis    • Asthma    • Body piercing     REPORTS CYLICONE IN EARS   • Clotting disorder     had a knee surgery   • Depression    • DVT (deep venous thrombosis)     RIGHT RIGHT KNEE AFTER SURGERY YEARS AGO IN  OR    • Gastric ulcer    • GERD (gastroesophageal reflux disease)    • H/O migraine    • H/O sleep apnea     REPORTS DIAGNOSED WITH SLEEP APNEA AND COULDN'T STAND TO WEAR THE MACHINE   • Headache    • Heart attack     REPORTS \"LIGHT HEART ATTACK A LONG TIME AGO\"  \"EARLY S\"   • History of seizures     REPORTS LAST EPISODE WAS AROUND .   • Hostility    • Hyperlipidemia    • Hypertension    • Knee pain, acute     Left   • Low back pain    • Migraine    • MRSA (methicillin resistant Staphylococcus aureus)     REPORTS LAST TESTED + . WAS TREATED HE REPORTS.  RIGHT ARM, RIGHT KNEE.   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    • Obesity    • Poor historian    • [place] spotted fever    • Tattoo    • Wears glasses         Past Surgical History:   Procedure Laterality Date   • BACK SURGERY     • BRAIN SURGERY      Tumor removal    • CARDIAC SURGERY      CARDIAC CATH REPORTED AS 2 MONTHS AGO IN [place]. REPORTS NO " STENTS PLACED.   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 Chondromalacia of left knee M94.262 717.7             Patient History       11/07/17 1500          History    Chief Complaint Pain;Difficulty Walking;Difficulty with daily activities  -BE      Type of Pain Knee pain  -BE      Date Current Problem(s) Began --   approximately 5 years  -BE      Brief Description of Current Complaint Patient reports that he had been experiencing knee pain for approximately 5 years, noting that frequent falls has continued to worsen the knee pain.  Patient reports that he underwent knee surgery on 10/20/2017 for left knee chondromalacia.  Patient reports that he initially used a wheelchair and crutches to be mobile at home and in the community.  Patient reports that he has continued to notice swelling and pain in the knee since his surgery; he reports that he continues to have difficulty with weightbearing on the left LE.  -BE      Onset Date- PT 11/7/2017  -BE      Patient/Caregiver Goals Improve strength;Relieve pain;Improve mobility  -BE      Current Tobacco Use Smoker  -BE      Smoking Status Smoker  -BE      Patient's Rating of General Health Fair  -BE      Hand Dominance right-handed  -BE      Occupation/sports/leisure activities Disabled  -BE      Patient seeing anyone else for problem(s)? Other (comment)   PCP  -BE      How has patient tried to help current problem? Medications, injections, Surgery, Ice, Heat  -BE      What clinical tests have you had for this problem? X-ray;MRI  -BE      Pain      Pain Location Knee  -BE      Pain at Present 8  -BE      Pain at Best 7  -BE      Pain at Worst 10  -BE      Pain Frequency Constant/continuous   Varies in intensity  -BE      Pain Description Sharp;Shooting;Stabbing  -BE      What Performance Factors Make the Current Problem(s) WORSE? Standing, walking, bending the knee  -BE      What Performance Factors Make the Current Problem(s) BETTER? Rest  -BE      Tolerance Time- Standing 10 min  -BE      Tolerance Time- Walking 10-15 min  -BE      Is your sleep disturbed? Yes  -BE      Total hours of sleep per night 2 hours disturbed  -BE      Difficulties with ADL's? Patient reports difficulty with LE dressing and needs assistance with getting in/out of the bathtub.  -BE      Fall Risk Assessment    Any falls in the past year: Yes  -BE      Number of falls reported in the last 12 months 4  -BE      Factors that contributed to the fall: Tripped;Lost balance  -BE      Does patient have a fear of falling Yes (comment)  -BE      Services    Are you currently receiving Home Health services No  -BE      Daily Activities    Primary Language English  -BE      How does patient learn best? Listening;Demonstration  -BE      Teaching needs identified Home Exercise Program  -BE      Does patient have problems with the following? Depression;Panic Attack;Anxiety  -BE      Pt Participated in POC and Goals Yes  -BE      Safety    Are you being hurt, hit, or frightened by anyone at home or in your life? No  -BE      Are you being neglected by a caregiver No  -BE        User Key  (r) = Recorded By, (t) = Taken By, (c) = Cosigned By    Initials Name Provider Type    BE Hanna Burks, PT Physical Therapist                PT Ortho       11/07/17 1500    Sensory Screen for Light Touch- Lower Quarter Clearing    L1 (inguinal area) Bilateral:;Intact  -BE    L2 (anterior mid thigh) Left:;Diminished;Right:;Intact  -BE    L3 (distal anterior thigh) Left:;Diminished;Right:;Intact  -BE    L4  (medial lower leg/foot) Left:;Diminished;Right:;Intact  -BE    L5 (lateral lower leg/great toe) Left:;Diminished;Right:;Intact  -BE    S1 (bottom of foot) Left:;Diminished;Right:;Intact  -BE    Myotomal Screen- Lower Quarter Clearing    Hip flexion (L2) Left:;4 (Good);Right:;4+ (Good +)  -BE    Knee extension (L3) Left:;Unable to assess;Right:;4+ (Good +)  -BE    Knee flexion (S2) Left:;Unable to assess;Right:;4+ (Good +)  -BE    Knee Palpation    Patella Tender  -BE    Patella Tendon Tender  -BE    Medial Joint   -BE    Lateral Joint   -BE    Quads Tender  -BE    Biceps Femoris Tender;Guarded/taut  -BE    Semimembranosis Tender;Guarded/taut  -BE    Semitendinosis Tender;Guarded/taut  -BE    Medial Gastroc Head Tender;Guarded/taut  -BE    Lateral Gastroc Head Tender;Guarded/taut  -BE    Left Knee    Extension/Flexion ROM Details 10-63  -BE    Right Knee    Extension/Flexion ROM Details 6-120  -BE    RLE Quick Girth (cm)    Mid patella 46 cm  -BE    LLE Quick Girth (cm)    Mid patella 48 cm  -BE      User Key  (r) = Recorded By, (t) = Taken By, (c) = Cosigned By    Initials Name Provider Type    BE Hanna Burks PT Physical Therapist                            Therapy Education       11/07/17 1606          Therapy Education    Given HEP;Symptoms/condition management;Pain management;Posture/body mechanics  -BE      Program Reinforced  -BE      How Provided Verbal;Demonstration;Written  -BE      Provided to Patient  -BE      Level of Understanding Verbalized;Demonstrated  -BE        User Key  (r) = Recorded By, (t) = Taken By, (c) = Cosigned By    Initials Name Provider Type    BE Hanna Burks PT Physical Therapist                PT OP Goals       11/07/17 1615 11/07/17 1600    PT Short Term Goals    STG Date to Achieve  11/21/17  -BE    STG 1  Patient will be independent/compliant with HEP.  -BE    STG 2  Left Knee ROM will improve to at least 5-90 degrees to allow for greater ease  with ADLs.  -BE    STG 3  Patient will report pain no greater than 7/10 when performing self-care activities.  -BE    Long Term Goals    LTG Date to Achieve  12/07/17  -BE    LTG 1  Patient will report pain no greater than 5/10 when ambulating with improved weightbearing fro 20 minutes to shop for groceries.  -BE    LTG 2  Left Knee ROM will improve to at least 0-115 to allow for improved gait pattern.  -BE    LTG 3  LE strength will improve to at least 4/5 to prevent injury.  -BE    LTG 4  LEFS will improve by at least 10% to show improved functional mobility.  -BE    Time Calculation    PT Goal Re-Cert Due Date 12/07/17  -BE 12/07/17  -BE      User Key  (r) = Recorded By, (t) = Taken By, (c) = Cosigned By    Initials Name Provider Type    BE Hanna Burks, PT Physical Therapist                PT Assessment/Plan       11/07/17 1609       PT Assessment    Functional Limitations Decreased safety during functional activities;Impaired gait;Limitations in community activities;Limitation in home management;Performance in self-care ADL;Performance in leisure activities;Limitations in functional capacity and performance  -BE     Impairments Balance;Gait;Range of motion;Sensation;Muscle strength;Pain;Posture;Joint mobility  -BE     Assessment Comments Patient is a 46 year old male referred to therapy with chondromalacia of the left knee; patient reports undergoing surgery on 10/20/2017.  Patient displays decreased knee ROM, decreased LE strength, decreased sensation, increased edema, and increased pain.  Patient reports difficulty with standing or walking, noting that it is difficult to weightbear on the left LE.  Patient reports a 38.75% functional mobility impairment, based on his score of 49/80 on the LEFS.  Patient will benefit from skilled PT so that he can achieve his maximum level of function.  -BE     Please refer to paper survey for additional self-reported information Yes  -BE     Rehab Potential Good  -BE      Patient/caregiver participated in establishment of treatment plan and goals Yes  -BE     Patient would benefit from skilled therapy intervention Yes  -BE     PT Plan    PT Frequency 2x/week  -BE     Predicted Duration of Therapy Intervention (days/wks) 4 weeks  -BE     Planned CPT's? PT EVAL HIGH COMPLEXITY: 96786;PT RE-EVAL: 76262;PT THER PROC EA 15 MIN: 32376;PT THER ACT EA 15 MIN: 51696;PT MANUAL THERAPY EA 15 MIN: 93540;PT NEUROMUSC RE-EDUCATION EA 15 MIN: 69363;PT GAIT TRAINING EA 15 MIN: 76290;PT ULTRASOUND EA 15 MIN: 76525;PT ELECTRICAL STIM UNATTEND: ;PT ELECTRICAL STIM ATTD EA 15 MIN: 52090;PT HOT/COLD PACK WC NONMCARE: 19087;PT THER SUPP EA 15 MIN  -BE     PT Plan Comments Above interventions to be used to promote improved functional mobility.  -BE       User Key  (r) = Recorded By, (t) = Taken By, (c) = Cosigned By    Initials Name Provider Type    BE Hanna Burks, PT Physical Therapist                  Exercises       11/07/17 1600          Exercise 1    Exercise Name 1 HEP: heel slides, quad sets, ankle pumps  -BE      Cueing 1 Verbal;Tactile;Demo  -BE        User Key  (r) = Recorded By, (t) = Taken By, (c) = Cosigned By    Initials Name Provider Type    BE Hanna Burks, PT Physical Therapist                              Outcome Measures       11/07/17 1500          Lower Extremity Functional Index    Any of your usual work, housework or school activities 3  -BE      Your usual hobbies, recreational or sporting activities 3  -BE      Getting into or out of the bath 3  -BE      Walking between rooms 3  -BE      Putting on your shoes or socks 3  -BE      Squatting 2  -BE      Lifting an object, like a bag of groceries from the floor 4  -BE      Performing light activities around your home 4  -BE      Performing heavy activities around your home 2  -BE      Getting into or out of a car 4  -BE      Walking 2 blocks 2  -BE      Walking a mile 3  -BE      Going up or down 10 stairs (about  1 flight of stairs) 2  -BE      Standing for 1 hour 2  -BE      Sitting for 1 hour 2  -BE      Running on even ground 1  -BE      Running on uneven ground 1  -BE      Making sharp turns while running fast 1  -BE      Hopping 1  -BE      Rolling over in bed 3  -BE      Total 49  -BE      Functional Assessment    Outcome Measure Options Lower Extremity Functional Scale (LEFS)  -BE        User Key  (r) = Recorded By, (t) = Taken By, (c) = Cosigned By    Initials Name Provider Type    BE Hanna Burks, PT Physical Therapist            Time Calculation:   Start Time: 1505  Stop Time: 1605  Time Calculation (min): 60 min     Therapy Charges for Today     Code Description Service Date Service Provider Modifiers Qty    [id number] HC PT EVAL HIGH COMPLEXITY 4 11/7/2017 Hanna Burks, PT GP 1          PT G-Codes  Outcome Measure Options: Lower Extremity Functional Scale (LEFS)         Hanna Burks, PT  11/7/2017

## 2017-11-13 ENCOUNTER — OFFICE VISIT (OUTPATIENT)
Dept: FAMILY MEDICINE CLINIC | Facility: CLINIC | Age: 47
End: 2017-11-13

## 2017-11-13 ENCOUNTER — HOSPITAL ENCOUNTER (OUTPATIENT)
Dept: PHYSICAL THERAPY | Facility: HOSPITAL | Age: 47
Setting detail: THERAPIES SERIES
Discharge: HOME | End: 2017-11-13

## 2017-11-13 VITALS
SYSTOLIC BLOOD PRESSURE: 120 MMHG | DIASTOLIC BLOOD PRESSURE: 70 MMHG | BODY MASS INDEX: 35.31 KG/M2 | OXYGEN SATURATION: 98 % | WEIGHT: 225 LBS | HEART RATE: 73 BPM | TEMPERATURE: 97.9 F | HEIGHT: 67 IN

## 2017-11-13 DIAGNOSIS — M94.262 CHONDROMALACIA OF LEFT KNEE: Primary | ICD-10-CM

## 2017-11-13 DIAGNOSIS — G89.29 CHRONIC BILATERAL LOW BACK PAIN WITH LEFT-SIDED SCIATICA: ICD-10-CM

## 2017-11-13 DIAGNOSIS — Z23 ENCOUNTER FOR VACCINATION: ICD-10-CM
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
## 2017-11-13 DIAGNOSIS — M54.42 CHRONIC BILATERAL LOW BACK PAIN WITH LEFT-SIDED SCIATICA: ICD-10-CM

## 2017-11-13 PROCEDURE — 99214 OFFICE O/P EST MOD 30 MIN: CPT | Performed by: NURSE PRACTITIONER

## 2017-11-13 PROCEDURE — 90471 IMMUNIZATION ADMIN: CPT | Performed by: NURSE PRACTITIONER

## 2017-11-13 PROCEDURE — 90732 PPSV23 VACC 2 YRS+ SUBQ/IM: CPT | Performed by: NURSE PRACTITIONER

## 2017-11-13 PROCEDURE — 97035 APP MDLTY 1+ULTRASOUND EA 15: CPT | Performed by: PHYSICAL THERAPIST

## 2017-11-13 PROCEDURE — 97110 THERAPEUTIC EXERCISES: CPT | Performed by: PHYSICAL THERAPIST

## 2017-11-13 PROCEDURE — G0283 ELEC STIM OTHER THAN WOUND: HCPCS | Performed by: PHYSICAL THERAPIST

## 2017-11-13 RX ORDER — HYDROCODONE BITARTRATE AND ACETAMINOPHEN 7.5; 325 MG/1; MG/1
1 TABLET ORAL 2 TIMES DAILY PRN
Qty: 60 TABLET | Refills: 0 | Status: SHIPPED | OUTPATIENT
Start: 2017-11-13 | End: 2017-12-13 | Stop reason: SDUPTHER

## 2017-11-13 NOTE — PROGRESS NOTES
"Subjective   Jaquan Mckay is a 46 y.o. male.     Chief Complaint   Patient presents with   • Follow-up     knee surgery   • Fall     today at home       History of Present Illness     Fall-at home approx 1 hour ago.  He is presently moving.  He reports he stumbled over something.  He hit his left knee that he just had surgery on.  Also hit his forehead on the right side near eye brow line.  Stubbed his 2nd, 3rd, and 4th digit of right hand and hit his right elbow.  He reports a \"knot\" just below the patella on the left.  Unable to bend his 3rd digit. He reports his elbow is \"throbbing\".  Not at goal.    Knee surgery-on left knee.  Surgery went well.  He reports an area of numbness on his lateral leg proximal to knee 2-3 inches and distal to knee 3 inches.  He did begin therapy this morning for his knee.  He reports \"arthritis changes bad to my knee\".    Insomnia-reports he will be sleeping in a new apartment today so he is hopeful he may sleep better.  Reports his breathing at the old apartment continues to be a problem.  Ongoing.  Will re-eval at next appt.    Low blood sugar-reports he has had several episodes of shaking and weakness.  Has been busy with moving and is only eating one meal per day.  He reports he does feel better after drinking juice and eating peanut butter.  Not at goal.   Foot and leg coldness-reports he has noted an increase in some bruising and a sensation of coldness in his feet especially.  He reports the problem is not new but does seem to be increasing over the last couple of weeks.  Problems is occurring bilaterally.  He reports the bruising areas are in a scattered pattern.  He has had a DVT in the past in the right calf.  He reports he is wearing multiple layers of socks most days.  He would like to have further check up.  Not at goal.     The following portions of the patient's history were reviewed and updated as appropriate: allergies, current medications, past family history, past " "medical history, past social history, past surgical history and problem list.    Review of Systems   Constitutional: Positive for fatigue. Negative for appetite change and fever.   HENT: Positive for congestion and sore throat (mostly in the AM). Negative for ear pain, postnasal drip, rhinorrhea, sinus pressure and tinnitus.    Eyes: Negative for pain and visual disturbance (wears glasses.  recent eye exam with new glasses).   Respiratory: Positive for cough and shortness of breath. Negative for chest tightness and wheezing.    Cardiovascular: Negative for chest pain and palpitations.   Gastrointestinal: Negative for abdominal pain, diarrhea and vomiting.   Genitourinary: Positive for dysuria (chronic) and testicular pain (chronic). Negative for hematuria and urgency.   Musculoskeletal: Positive for arthralgias, back pain (with radiation to BLE \"down to ankles\" ) and myalgias.   Neurological: Positive for weakness (BLE), numbness (and tingling with \"electricity\" sensation in his legs) and headaches (more tension like in the last few weeks). Negative for dizziness.   Psychiatric/Behavioral: Positive for sleep disturbance. Negative for dysphoric mood and suicidal ideas. The patient is not nervous/anxious.    All other systems reviewed and are negative.    Objective     /70 (BP Location: Left arm, Patient Position: Sitting)  Pulse 73  Temp 97.9 °F (36.6 °C) (Tympanic)   Ht 67\" (170.2 cm)  Wt 225 lb (102 kg)  SpO2 98%  BMI 35.24 kg/m2    Physical Exam   Constitutional: He is oriented to person, place, and time. He appears well-developed and well-nourished.   HENT:   Head: Normocephalic and atraumatic.   Right Ear: Tympanic membrane, external ear and ear canal normal.   Left Ear: Tympanic membrane, external ear and ear canal normal.   Nose: No mucosal edema or rhinorrhea.   Mouth/Throat: Oropharynx is clear and moist. No oropharyngeal exudate or posterior oropharyngeal erythema.   Eyes: Conjunctivae and EOM " are normal. Pupils are equal, round, and reactive to light. No scleral icterus.   Neck: Normal range of motion. Neck supple. No JVD present. No thyromegaly present.   Cardiovascular: Normal rate, regular rhythm and normal heart sounds.    No murmur heard.  Pulmonary/Chest: Effort normal. No respiratory distress. He has decreased breath sounds (bilateral upper lobes). He has no wheezes. He exhibits no tenderness.   Abdominal: Soft. Bowel sounds are normal. He exhibits no mass. There is no tenderness.   Musculoskeletal: He exhibits no edema.        Right elbow: Tenderness found. Medial epicondyle and olecranon process tenderness noted.        Left knee: He exhibits decreased range of motion and swelling (mild). He exhibits no ecchymosis. Tenderness found. Medial joint line and lateral joint line tenderness noted.        Lumbar back: He exhibits decreased range of motion, tenderness, pain and spasm. He exhibits no swelling.        Right hand: He exhibits tenderness (3rd digit). He exhibits normal capillary refill.   Gait shuffling with limping noted.  More prominent on right side from behind      Skin Integrity  -  His right foot skin is intact.     Jaquan 's left foot skin is intact. .  Lymphadenopathy:        Head (right side): No submandibular adenopathy present.        Head (left side): No submandibular adenopathy present.     He has no cervical adenopathy.   Neurological: He is alert and oriented to person, place, and time. He has normal reflexes. No cranial nerve deficit. He exhibits normal muscle tone. Coordination normal.   Skin: Skin is warm and dry.   Psychiatric: His speech is normal and behavior is normal. Judgment and thought content normal. His mood appears not anxious. He is not actively hallucinating. Cognition and memory are normal. He exhibits a depressed mood. He expresses no homicidal and no suicidal ideation. He exhibits normal recent memory and normal remote memory. He is attentive.   Vitals  reviewed.    Assessment/Plan     Problem List Items Addressed This Visit        Nervous and Auditory    Chronic bilateral low back pain with left-sided sciatica    Relevant Medications    HYDROcodone-acetaminophen (NORCO) 7.5-325 MG per tablet      Other Visit Diagnoses     Fall, initial encounter    -  Primary    Relevant Orders    XR Hand 3+ View Right (Completed)    XR elbow 2 vw right    XR knee 4+ vw left (Completed)    Encounter for vaccination        Relevant Orders    Pneumococcal Polysaccharide Vaccine 23-Valent Greater Than or Equal To 1yo Subcutaneous / IM (Completed)      report he has changed his pharmacy back to [facility] today.    Ice pack to knee while in office for acute pain symptoms.   CHAVEZ reviewed today and consistent.  Will refill prescribed controlled medication today.  Patient is aware they cannot receive narcotics from any other provider.  Risk and benefits of medication use has been reviewed.  History and physical exam exhibit continued safe and appropriate use of controlled substances.  Understands disease processes and need for medications.  Understands reasons for urgent and emergent care.  Patient (& family) verbalized agreement for treatment plan.   Vaccines today.  Will RTC to have flu vaccine when more are available.    RTC 1 month, sooner if needed.         This document has been electronically signed by:  BALDOMERO Thao, FNP-C

## 2017-11-13 NOTE — THERAPY TREATMENT NOTE
"    Outpatient Physical Therapy Ortho Treatment Note   [facility]     Patient Name: Jaquan Mckay  : 1970  MRN: [id number]  Today's Date: 2017      Visit Date: 2017    Visit Dx:    ICD-10-CM ICD-9-CM   1. Chondromalacia of left knee M94.262 717.7       Patient Active Problem List   Diagnosis   • GERD (gastroesophageal reflux disease)   • Arthritis   • Hypertension   • Seizure disorder   • Hyperlipidemia   • Anxiety   • Varicocele   • Varicocele present on ultrasound of scrotum   • Obesity (BMI 30.0-34.9)   • Chronic bilateral low back pain with left-sided sciatica   • Seasonal allergic rhinitis due to pollen   • Mild intermittent asthma with acute exacerbation   • Precordial pain   • Dysuria   • Congenital coronary artery anomaly   • BMI 35.0-35.9,adult   • Chondromalacia, knee        Past Medical History:   Diagnosis Date   • Allergic    • Anxiety    • Arthritis    • Asthma    • Body piercing     REPORTS CYLICONE IN EARS   • Clotting disorder     had a knee surgery   • Depression    • DVT (deep venous thrombosis)     RIGHT RIGHT KNEE AFTER SURGERY YEARS AGO IN  [place]    • Gastric ulcer    • GERD (gastroesophageal reflux disease)    • H/O migraine    • H/O sleep apnea     REPORTS DIAGNOSED WITH SLEEP APNEA AND COULDN'T STAND TO WEAR THE MACHINE   • Headache    • Heart attack     REPORTS \"LIGHT HEART ATTACK A LONG TIME AGO\"  \"EARLY \"   • History of seizures     REPORTS LAST EPISODE WAS AROUND .   • Hostility    • Hyperlipidemia    • Hypertension    • Knee pain, acute     Left   • Low back pain    • Migraine    • MRSA (methicillin resistant Staphylococcus aureus)     REPORTS LAST 2004. WAS TREATED HE REPORTS.  RIGHT ARM, RIGHT KNEE.   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    • Obesity    • Poor historian    • Carl [place] spotted fever    • Tattoo    • Wears glasses         Past Surgical History:   Procedure Laterality Date   • BACK SURGERY     • BRAIN SURGERY      Tumor removal    • " CARDIAC SURGERY      CARDIAC CATH REPORTED AS 2 MONTHS AGO IN [place]. REPORTS NO STENTS PLACED.   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  he received US at end of session and ice.  he reported no increased pain with session and stated pain persists after moving this weekend.   -AT       User Key  (r) = Recorded By, (t) = Taken By, (c) = Cosigned By    Initials Name Provider Type    AT Sintia Rodriguez, PT Physical Therapist                Modalities       11/13/17 1100          Subjective Comments    Subjective Comments Pt arrives today and reports that he has increased pain today.  He reports that he has been moving furniture all weekend and stated he fell on stairs once as well and his knee has pain has increased.  He was informed that lifting, pushing and pulling after undergoing surgery was not recommended.   -AT      Subjective Pain    Able to rate subjective pain? yes  -AT      Pre-Treatment Pain Level 8  -AT      Post-Treatment Pain Level 8  -AT      Moist Heat    MH Applied Yes   with estim   -AT      Location left knee  -AT      Rx Minutes 10 mins  -AT      MH Prior to Rx Yes  -AT      Ice    Ice Applied Yes  -AT      Location left knee  -AT      Rx Minutes --   8  -AT      Ice S/P Rx Yes  -AT      Ultrasound 63225    Location med/lateral knee  -AT      Rx Minutes 8  -AT      Duty Cycle 50  -AT       Frequency --   3.3  -AT      Intensity - Wts/cm 1.3  -AT      ELECTRICAL STIMULATION    Attended/Unattended Unattended  -AT      Stimulation Type IFC  -AT      Location/Electrode Placement/Other left knee  -AT      Rx Minutes 10 mins  -AT        User Key  (r) = Recorded By, (t) = Taken By, (c) = Cosigned By    Initials Name Provider Type    AT Sintia Rodriguez PT Physical Therapist                Exercises       11/13/17 1100          Subjective Comments    Subjective Comments Pt arrives today and reports that he has increased pain today.  He reports that he has been moving furniture all weekend and stated he fell on stairs once as well and his knee has pain has increased.  He was informed that lifting, pushing and pulling after undergoing surgery was not recommended.   -AT      Subjective Pain    Able to rate subjective pain? yes  -AT      Pre-Treatment Pain Level 8  -AT      Post-Treatment Pain Level 8  -AT      Exercise 1    Exercise Name 1  QS, SAQ, HS, LAQ, SLR x 15 each  knee flex towel stretch 3x 20 sec   -AT        User Key  (r) = Recorded By, (t) = Taken By, (c) = Cosigned By    Initials Name Provider Type    AT Sintia Rodriguez PT Physical Therapist                                       Time Calculation:   Start Time: 1115  Stop Time: 1200  Time Calculation (min): 45 min    Therapy Charges for Today     Code Description Service Date Service Provider Modifiers Qty    42799809550 HC PT ELECTRICAL STIM UNATTENDED 11/13/2017 Sintia Rodriguez PT  1    88245127856 HC PT ULTRASOUND EA 15 MIN 11/13/2017 Sintia Rodriguez, PT GP 1    82924000269 HC PT THER PROC EA 15 MIN 11/13/2017 Sintia Rodriguez PT GP 1                    Sintia Rodriguez PT  11/13/2017

## 2017-11-14 ENCOUNTER — HOSPITAL ENCOUNTER (OUTPATIENT)
Dept: GENERAL RADIOLOGY | Facility: HOSPITAL | Age: 47
Discharge: HOME | End: 2017-11-14
Admitting: NURSE PRACTITIONER

## 2017-11-14 ENCOUNTER — HOSPITAL ENCOUNTER (OUTPATIENT)
Dept: GENERAL RADIOLOGY | Facility: HOSPITAL | Age: 47
Discharge: HOME | End: 2017-11-14

## 2017-11-14 PROCEDURE — 73564 X-RAY EXAM KNEE 4 OR MORE: CPT

## 2017-11-14 PROCEDURE — 73130 X-RAY EXAM OF HAND: CPT

## 2017-11-14 PROCEDURE — 73564 X-RAY EXAM KNEE 4 OR MORE: CPT | Performed by: RADIOLOGY

## 2017-11-14 PROCEDURE — 73130 X-RAY EXAM OF HAND: CPT | Performed by: RADIOLOGY

## 2017-11-15 ENCOUNTER — HOSPITAL ENCOUNTER (OUTPATIENT)
Dept: PHYSICAL THERAPY | Facility: HOSPITAL | Age: 47
Setting detail: THERAPIES SERIES
Discharge: HOME | End: 2017-11-15

## 2017-11-15 DIAGNOSIS — M94.262 CHONDROMALACIA OF LEFT KNEE: Primary | ICD-10-CM

## 2017-11-15 PROCEDURE — G0283 ELEC STIM OTHER THAN WOUND: HCPCS

## 2017-11-15 PROCEDURE — 97110 THERAPEUTIC EXERCISES: CPT

## 2017-11-15 NOTE — THERAPY TREATMENT NOTE
"    Outpatient Physical Therapy Ortho Treatment Note   [place]     Patient Name: Jaquan Mckay  : 1970  MRN: [id number]  Today's Date: 11/15/2017      Visit Date: 11/15/2017    Visit Dx:    ICD-10-CM ICD-9-CM   1. Chondromalacia of left knee M94.262 717.7       Patient Active Problem List   Diagnosis   • GERD (gastroesophageal reflux disease)   • Arthritis   • Hypertension   • Seizure disorder   • Hyperlipidemia   • Anxiety   • Varicocele   • Varicocele present on ultrasound of scrotum   • Obesity (BMI 30.0-34.9)   • Chronic bilateral low back pain with left-sided sciatica   • Seasonal allergic rhinitis due to pollen   • Mild intermittent asthma with acute exacerbation   • Precordial pain   • Dysuria   • Congenital coronary artery anomaly   • BMI 35.0-35.9,adult   • Chondromalacia, knee        Past Medical History:   Diagnosis Date   • Allergic    • Anxiety    • Arthritis    • Asthma    • Body piercing     REPORTS CYLICONE IN EARS   • Clotting disorder     had a knee surgery   • Depression    • DVT (deep venous thrombosis)     RIGHT RIGHT KNEE AFTER SURGERY YEARS AGO IN  [place]    • Gastric ulcer    • GERD (gastroesophageal reflux disease)    • H/O migraine    • H/O sleep apnea     REPORTS DIAGNOSED WITH SLEEP APNEA AND COULDN'T STAND TO WEAR THE MACHINE   • Headache    • Heart attack     REPORTS \"LIGHT HEART ATTACK A LONG TIME AGO\"  \"EARLY \"   • History of seizures     REPORTS LAST EPISODE WAS AROUND .   • Hostility    • Hyperlipidemia    • Hypertension    • Knee pain, acute     Left   • Low back pain    • Migraine    • MRSA (methicillin resistant Staphylococcus aureus)     REPORTS LAST 2004. WAS TREATED HE REPORTS.  RIGHT ARM, RIGHT KNEE.   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    • Obesity    • Poor historian    • Carl [place] spotted fever    • Tattoo    • Wears glasses         Past Surgical History:   Procedure Laterality Date   • BACK SURGERY     • BRAIN SURGERY      Tumor removal    • " CARDIAC SURGERY      CARDIAC CATH REPORTED AS 2 MONTHS AGO IN [place]. REPORTS NO STENTS PLACED.   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 Patient states he went to his doctor due to the fall and he received an X-Ray of his L) knee and R) wrist due to the fall. Patient reports he is having continued soreness and swelling in his L) knee. Patient reports he is contiuing to move furniture at home and understands that he shouldn't be lifting, pushing, and pulling on items.  -AC    Subjective Pain    Able to rate subjective pain? yes  -AC    Pre-Treatment Pain Level 8  -AC    Post-Treatment Pain Level 7  -AC    Posture/Observations    Posture/Observations Comments Patient demonstrates increased swelling in the L) lateral knee. Numbness noted in the L) lateral knee.  -AC      User Key  (r) = Recorded By, (t) = Taken By, (c) = Cosigned By    Initials Name Provider Type    AC Radha Martin PTA Physical Therapy Assistant                            PT Assessment/Plan       11/15/17 5316       PT Assessment    Assessment Comments Patient tolerated treatment session fair with rest breaks taken as needed by the patient. Educated patient to perfom ther ex per his tolerance, patient verbalized understanding. Supervising PT, Radha Tijerina, PT, DPT assessed patient secondary to reports of fall on step and hitting L) knee. Following PT's assessment PT reports to PTA to  continue with conservative treatment per the patient's tolerance and ice following session. No adverse reactions with modalities or treatment. Educated patient on not lifting, pulling, and pushing on items until cleared by MD, patient verbalized understanding.   -AC     PT Plan    PT Plan Comments Continue per PT's POC, progress per the patient's tolerance.  -AC       User Key  (r) = Recorded By, (t) = Taken By, (c) = Cosigned By    Initials Name Provider Type    RISHI Martin PTA Physical Therapy Assistant                Modalities       11/15/17 1400          Moist Heat    MH Applied Yes   No redness noted following moist heat  -AC      Location left knee  -AC      Rx Minutes 10 mins  -AC      MH Prior to Rx Yes  -AC      Ice    Ice Applied Yes  -AC      Location left knee  -AC      Rx Minutes Other:   8 minutes  -AC      Ice S/P Rx Yes  -AC      ELECTRICAL STIMULATION    Attended/Unattended Unattended   No irritation noted following estim  -AC      Stimulation Type IFC  -AC      Max mAmp --   per the patient's tolerance  -AC      Location/Electrode Placement/Other left knee  -AC      Rx Minutes 10 mins  -AC        User Key  (r) = Recorded By, (t) = Taken By, (c) = Cosigned By    Initials Name Provider Type    RISHI Martin PTA Physical Therapy Assistant                Exercises       11/15/17 1400          Subjective Comments    Subjective Comments Patient reports of falling on a step and hitting his left knee below his incision. Patient states he went to his doctor due to the fall and he received an X-Ray of his L) knee and R) wrist due to the fall. Patient reports he is having continued soreness and swelling in his L) knee. Patient reports he is contiuing to move furniture at home and understands that he shouldn't be lifting, pushing, and pulling on items.  -AC      Subjective Pain    Able to rate subjective pain? yes  -AC      Pre-Treatment Pain Level 8  -AC      Post-Treatment Pain  Level 7  -AC      Exercise 1    Exercise Name 1 QS x15, heel slides x15, SLR x15, SAQ x15  -AC      Cueing 1 Verbal;Tactile;Demo  -AC      Time (Minutes) 1 10 minutes  -AC        User Key  (r) = Recorded By, (t) = Taken By, (c) = Cosigned By    Initials Name Provider Type    RISHI Martin PTA Physical Therapy Assistant                                   Therapy Education       11/15/17 1445          Therapy Education    Given HEP;Symptoms/condition management;Pain management  -AC      Program Reinforced  -AC      How Provided Verbal;Demonstration  -AC      Provided to Patient  -AC      Level of Understanding Verbalized;Demonstrated  -AC        User Key  (r) = Recorded By, (t) = Taken By, (c) = Cosigned By    Initials Name Provider Type    RISHI Martin PTA Physical Therapy Assistant                Time Calculation:   Start Time: 1058  Stop Time: 1150  Time Calculation (min): 52 min    Therapy Charges for Today     Code Description Service Date Service Provider Modifiers Qty    91160601443 HC PT THER PROC EA 15 MIN 11/15/2017 Radha Martin PTA GP 1    70188238546 HC PT ELECTRICAL STIM UNATTENDED 11/15/2017 Radha Martin PTA  1                    Radha Martin PTA  11/15/2017

## 2017-11-20 ENCOUNTER — HOSPITAL ENCOUNTER (OUTPATIENT)
Dept: PHYSICAL THERAPY | Facility: HOSPITAL | Age: 47
Setting detail: THERAPIES SERIES
Discharge: HOME | End: 2017-11-20

## 2017-11-20 DIAGNOSIS — M94.262 CHONDROMALACIA OF LEFT KNEE: Primary | ICD-10-CM

## 2017-11-20 PROCEDURE — 97110 THERAPEUTIC EXERCISES: CPT

## 2017-11-20 PROCEDURE — 97035 APP MDLTY 1+ULTRASOUND EA 15: CPT

## 2017-11-20 PROCEDURE — G0283 ELEC STIM OTHER THAN WOUND: HCPCS

## 2017-11-20 NOTE — THERAPY TREATMENT NOTE
"    Outpatient Physical Therapy Ortho Treatment Note   [place]     Patient Name: Jaquan Mckay  : 1970  MRN: [id number]  Today's Date: 2017      Visit Date: 2017    Visit Dx:    ICD-10-CM ICD-9-CM   1. Chondromalacia of left knee M94.262 717.7       Patient Active Problem List   Diagnosis   • GERD (gastroesophageal reflux disease)   • Arthritis   • Hypertension   • Seizure disorder   • Hyperlipidemia   • Anxiety   • Varicocele   • Varicocele present on ultrasound of scrotum   • Obesity (BMI 30.0-34.9)   • Chronic bilateral low back pain with left-sided sciatica   • Seasonal allergic rhinitis due to pollen   • Mild intermittent asthma with acute exacerbation   • Precordial pain   • Dysuria   • Congenital coronary artery anomaly   • BMI 35.0-35.9,adult   • Chondromalacia, knee        Past Medical History:   Diagnosis Date   • Allergic    • Anxiety    • Arthritis    • Asthma    • Body piercing     REPORTS CYLICONE IN EARS   • Clotting disorder     had a knee surgery   • Depression    • DVT (deep venous thrombosis)     RIGHT RIGHT KNEE AFTER SURGERY YEARS AGO IN  OR    • Gastric ulcer    • GERD (gastroesophageal reflux disease)    • H/O migraine    • H/O sleep apnea     REPORTS DIAGNOSED WITH SLEEP APNEA AND COULDN'T STAND TO WEAR THE MACHINE   • Headache    • Heart attack     REPORTS \"LIGHT HEART ATTACK A LONG TIME AGO\"  \"EARLY \"   • History of seizures     REPORTS LAST EPISODE WAS AROUND .   • Hostility    • Hyperlipidemia    • Hypertension    • Knee pain, acute     Left   • Low back pain    • Migraine    • MRSA (methicillin resistant Staphylococcus aureus)     REPORTS LAST 2004. WAS TREATED HE REPORTS.  RIGHT ARM, RIGHT KNEE.   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    • Obesity    • Poor historian    • Carl Mountain spotted fever    • Tattoo    • Wears glasses         Past Surgical History:   Procedure Laterality Date   • BACK SURGERY     • BRAIN SURGERY      Tumor removal    • " CARDIAC SURGERY      CARDIAC CATH REPORTED AS 2 MONTHS AGO IN [place]. REPORTS NO STENTS PLACED.   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 Patient reports of continued moving at home but is taking it easy now.  Patient reports of having a f/u appt with his MD on 12/14/17.  -AC    Subjective Pain    Able to rate subjective pain? yes  -AC    Pre-Treatment Pain Level 8  -AC    Post-Treatment Pain Level 8  -AC    Posture/Observations    Posture/Observations Comments Pt. had slight swelling in the L) lateral knee incision. Patient demonstrated tenderness to palpation around the L) lateral and medial knee.  -AC      User Key  (r) = Recorded By, (t) = Taken By, (c) = Cosigned By    Initials Name Provider Type    AC Radha Martin, PTA Physical Therapy Assistant                            PT Assessment/Plan       11/20/17 1226       PT Assessment    Assessment Comments New ther ex added per the patient's tolerance, patient demonstrated and understood new ther ex with no increase in pain noted. Patient tolerated treatment session well with soreness noted following static knee ext hang on bolster. Educated patient to perform ther ex per his tolerance, patient verbalized understanding. No adverse reactions with modalities or treatment. Patient educated to continue to not be lifting, pulling, or pushing during moving until instructed otherwise by MD, patient verbalized  understanding.  -AC     PT Plan    PT Plan Comments Continue per PT's POC, progress per the patient's tolerance.  -AC       User Key  (r) = Recorded By, (t) = Taken By, (c) = Cosigned By    Initials Name Provider Type    RISHI Martin PTA Physical Therapy Assistant                Modalities       11/20/17 1200          Moist Heat    MH Applied Yes   No redness noted following moist heat  -AC      Location left knee  -AC      Rx Minutes 15 mins  -AC      MH Prior to Rx Yes  -AC      Ultrasound 46680    Location med/lateral knee  -AC      Rx Minutes 8 minutes  -AC      Duty Cycle 50  -AC      Frequency --   3.3MHz  -AC      Intensity - Wts/cm 1.2  -AC      ELECTRICAL STIMULATION    Attended/Unattended Unattended   No irritation noted following estim  -AC      Stimulation Type IFC  -AC      Max mAmp --   per the patient's tolerance  -AC      Location/Electrode Placement/Other left knee  -AC      Rx Minutes 15 mins  -AC        User Key  (r) = Recorded By, (t) = Taken By, (c) = Cosigned By    Initials Name Provider Type    RISHI Martin PTA Physical Therapy Assistant                Exercises       11/20/17 1200          Subjective Comments    Subjective Comments Patient states that he is having numbness on the outside and behind the L) knee. Patient reports of continued moving at home but is taking it easy now.  Patient reports of having a f/u appt with his MD on 12/14/17.  -AC      Subjective Pain    Able to rate subjective pain? yes  -AC      Pre-Treatment Pain Level 8  -AC      Post-Treatment Pain Level 8  -AC      Exercise 1    Exercise Name 1 QS 10x2, HS x15, seated skateboard knee flex x15, LAQ x15, ball squeeze x15, hip abd with YTB x15, static knee ext with bolster under heel x1 min  -AC      Cueing 1 Verbal;Tactile;Demo  -AC      Time (Minutes) 1 22 minutes  -AC        User Key  (r) = Recorded By, (t) = Taken By, (c) = Cosigned By    Initials Name Provider Type    RISHI Martínez  NEIL Martin Physical Therapy Assistant                                   Therapy Education       11/20/17 1226          Therapy Education    Given HEP;Symptoms/condition management;Pain management  -AC      Program New  -AC      How Provided Verbal;Demonstration  -AC      Provided to Patient  -AC      Level of Understanding Verbalized;Demonstrated  -AC        User Key  (r) = Recorded By, (t) = Taken By, (c) = Cosigned By    Initials Name Provider Type    AC Radha Martin PTA Physical Therapy Assistant                Time Calculation:   Start Time: 1050  Stop Time: 1155  Time Calculation (min): 65 min    Therapy Charges for Today     Code Description Service Date Service Provider Modifiers Qty    48676436032 HC PT THER PROC EA 15 MIN 11/20/2017 Radha Martin PTA GP 1    78388643762 HC PT ELECTRICAL STIM UNATTENDED 11/20/2017 Radha Martin PTA  1    91879140952 HC PT ULTRASOUND EA 15 MIN 11/20/2017 Radha Martin PTA GP 1    39171002709 HC PT THER SUPP EA 15 MIN 11/20/2017 Radha Martin PTA GP 1                    Radha Martin PTA  11/20/2017

## 2017-11-22 ENCOUNTER — HOSPITAL ENCOUNTER (OUTPATIENT)
Dept: PHYSICAL THERAPY | Facility: HOSPITAL | Age: 47
Setting detail: THERAPIES SERIES
Discharge: HOME | End: 2017-11-22

## 2017-11-22 DIAGNOSIS — M94.262 CHONDROMALACIA OF LEFT KNEE: Primary | ICD-10-CM

## 2017-11-22 PROCEDURE — 97110 THERAPEUTIC EXERCISES: CPT | Performed by: PHYSICAL THERAPIST

## 2017-11-22 PROCEDURE — 97035 APP MDLTY 1+ULTRASOUND EA 15: CPT | Performed by: PHYSICAL THERAPIST

## 2017-11-22 PROCEDURE — G0283 ELEC STIM OTHER THAN WOUND: HCPCS | Performed by: PHYSICAL THERAPIST

## 2017-11-22 NOTE — THERAPY TREATMENT NOTE
"    Outpatient Physical Therapy Ortho Treatment Note   [place]     Patient Name: Jaquan Mckay  : 1970  MRN: [id number]  Today's Date: 2017      Visit Date: 2017    Visit Dx:    ICD-10-CM ICD-9-CM   1. Chondromalacia of left knee M94.262 717.7       Patient Active Problem List   Diagnosis   • GERD (gastroesophageal reflux disease)   • Arthritis   • Hypertension   • Seizure disorder   • Hyperlipidemia   • Anxiety   • Varicocele   • Varicocele present on ultrasound of scrotum   • Obesity (BMI 30.0-34.9)   • Chronic bilateral low back pain with left-sided sciatica   • Seasonal allergic rhinitis due to pollen   • Mild intermittent asthma with acute exacerbation   • Precordial pain   • Dysuria   • Congenital coronary artery anomaly   • BMI 35.0-35.9,adult   • Chondromalacia, knee        Past Medical History:   Diagnosis Date   • Allergic    • Anxiety    • Arthritis    • Asthma    • Body piercing     REPORTS CYLICONE IN EARS   • Clotting disorder     had a knee surgery   • Depression    • DVT (deep venous thrombosis)     RIGHT RIGHT KNEE AFTER SURGERY YEARS AGO IN  [place]    • Gastric ulcer    • GERD (gastroesophageal reflux disease)    • H/O migraine    • H/O sleep apnea     REPORTS DIAGNOSED WITH SLEEP APNEA AND COULDN'T STAND TO WEAR THE MACHINE   • Headache    • Heart attack     REPORTS \"LIGHT HEART ATTACK A LONG TIME AGO\"  \"EARLY \"   • History of seizures     REPORTS LAST EPISODE WAS AROUND .   • Hostility    • Hyperlipidemia    • Hypertension    • Knee pain, acute     Left   • Low back pain    • Migraine    • MRSA (methicillin resistant Staphylococcus aureus)     REPORTS LAST 2004. WAS TREATED HE REPORTS.  RIGHT ARM, RIGHT KNEE.   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    • Obesity    • Poor historian    • Carl Mountain spotted fever    • Tattoo    • Wears glasses         Past Surgical History:   Procedure Laterality Date   • BACK SURGERY     • BRAIN SURGERY      Tumor removal    • " CARDIAC SURGERY      CARDIAC CATH REPORTED AS 2 MONTHS AGO IN [place]. REPORTS NO STENTS PLACED.   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  He notes that he hit his left knee on the edge of the tub; he states that he experienced 10/10 pain after the incident.  Patient notes that pain is 8/10 currently.  -BE    Subjective Pain    Able to rate subjective pain? yes  -BE    Pre-Treatment Pain Level 8  -BE    Post-Treatment Pain Level 7  -BE      11/20/17 1200    Subjective Comments    Subjective Comments Patient states that he is having numbness on the outside and behind the L) knee. Patient reports of continued moving at home but is taking it easy now.  Patient reports of having a f/u appt with his MD on 12/14/17.  -AC    Subjective Pain    Able to rate subjective pain? yes  -AC    Pre-Treatment Pain Level 8  -AC    Post-Treatment Pain Level 8  -AC    Posture/Observations    Posture/Observations Comments Pt. had slight swelling in the L) lateral knee incision. Patient demonstrated tenderness to palpation around the L) lateral and medial knee.  -AC      User Key  (r) = Recorded By, (t) = Taken By, (c) = Cosigned By    Initials Name Provider Type    AC Radha Martin, NEIL Physical Therapy Assistant    BE Hanna Burks, PT Physical Therapist                            PT Assessment/Plan       11/22/17 3804       PT Assessment    Assessment Comments Patient  presented with 8/10 pain today pre-tx.  Increased edema and palpable tenderness noted at the left knee.  Dr. Aguirre's office contacted regarding patient's report of frequent falls and hitting knee last night on the bath tub after near-fall; office instructed for patient to return for follow-up on 11/30/2017, instead of 12/14/2017.  Patient to continue with PT, per his tolerance.  Session consisted of MH/ESTIM to the right knee to assist with pain control.  Increased time required for ther ex, due to increased pain.  Treatment session concluded with US, with no skin irritation noted.  Patient to continue to be progresed per his tolerance and POC.  -BE     PT Plan    PT Plan Comments Progress per patient's tolerance and POC.  -BE       User Key  (r) = Recorded By, (t) = Taken By, (c) = Cosigned By    Initials Name Provider Type    SWAPNA Burks, PT Physical Therapist                Modalities       11/22/17 1400          Moist Heat    MH Applied Yes   No redness following MH  -BE      Location left knee  -BE      Rx Minutes 15 mins  -BE      MH Prior to Rx Yes  -BE      Ultrasound [id number]    Location med/lateral knee   No skin irritation following US  -BE      Rx Minutes 8 minutes  -BE      Duty Cycle 50  -BE      Frequency --   3.3MHz  -BE      Intensity - Wts/cm 1.2  -BE      ELECTRICAL STIMULATION    Attended/Unattended Unattended   No irritation noted following estim  -BE      Stimulation Type IFC  -BE      Max mAmp --   per the patient's tolerance  -BE      Location/Electrode Placement/Other left knee  -BE      Rx Minutes 15 mins  -BE        User Key  (r) = Recorded By, (t) = Taken By, (c) = Cosigned By    Initials Name Provider Type    SWAPNA Burks, PT Physical Therapist                Exercises       11/22/17 1400          Subjective Comments    Subjective Comments Patient reports that he had a near-fall last night when stepping out of the bathtub.  He notes that he hit his left knee on the edge  of the tub; he states that he experienced 10/10 pain after the incident.  Patient notes that pain is 8/10 currently.  -BE      Subjective Pain    Able to rate subjective pain? yes  -BE      Pre-Treatment Pain Level 8  -BE      Post-Treatment Pain Level 7  -BE      Exercise 1    Exercise Name 1 QS, HS, SAQ, SLR  -BE      Cueing 1 Verbal;Tactile;Demo  -BE      Time (Minutes) 1 15 min  -BE        User Key  (r) = Recorded By, (t) = Taken By, (c) = Cosigned By    Initials Name Provider Type    BE Hanna Burks, PT Physical Therapist                                   Therapy Education       11/22/17 1948          Therapy Education    Given HEP;Symptoms/condition management;Pain management  -BE      Program Reinforced  -BE      How Provided Verbal;Demonstration  -BE      Provided to Patient  -BE      Level of Understanding Verbalized;Teach back education performed  -BE        User Key  (r) = Recorded By, (t) = Taken By, (c) = Cosigned By    Initials Name Provider Type    BE Hanna Burks PT Physical Therapist                Time Calculation:   Start Time: 1100  Stop Time: 1153  Time Calculation (min): 53 min    Therapy Charges for Today     Code Description Service Date Service Provider Modifiers Qty    57130447577 HC PT ELECTRICAL STIM UNATTENDED 11/22/2017 Hanna Burks, PT  1    25631079951 HC PT THER PROC EA 15 MIN 11/22/2017 Hanna Burks, PT GP 1    02530723887 HC PT ULTRASOUND EA 15 MIN 11/22/2017 Hanna Burks, PT GP 1                    Hanna Burks PT  11/22/2017

## 2017-11-23 ENCOUNTER — HOSPITAL ENCOUNTER (EMERGENCY)
Facility: HOSPITAL | Age: 47
Discharge: HOME | End: 2017-11-23
Attending: EMERGENCY MEDICINE | Admitting: EMERGENCY MEDICINE

## 2017-11-23 VITALS
TEMPERATURE: 97.9 F | WEIGHT: 218 LBS | HEART RATE: 84 BPM | DIASTOLIC BLOOD PRESSURE: 77 MMHG | RESPIRATION RATE: 16 BRPM | SYSTOLIC BLOOD PRESSURE: 134 MMHG | OXYGEN SATURATION: 99 % | HEIGHT: 67 IN | BODY MASS INDEX: 34.21 KG/M2

## 2017-11-23 DIAGNOSIS — S61.211A LACERATION OF INDEX FINGER OF LEFT HAND WITHOUT COMPLICATION, INITIAL ENCOUNTER: Primary | ICD-10-CM

## 2017-11-23 PROCEDURE — 99282 EMERGENCY DEPT VISIT SF MDM: CPT

## 2017-11-23 PROCEDURE — 90715 TDAP VACCINE 7 YRS/> IM: CPT | Performed by: PHYSICIAN ASSISTANT

## 2017-11-23 PROCEDURE — 25010000002 TDAP 5-2.5-18.5 LF-MCG/0.5 SUSPENSION: Performed by: PHYSICIAN ASSISTANT

## 2017-11-23 PROCEDURE — 90471 IMMUNIZATION ADMIN: CPT | Performed by: PHYSICIAN ASSISTANT

## 2017-11-23 RX ADMIN — TETANUS TOXOID, REDUCED DIPHTHERIA TOXOID AND ACELLULAR PERTUSSIS VACCINE, ADSORBED 0.5 ML: 5; 2.5; 8; 8; 2.5 SUSPENSION INTRAMUSCULAR at 18:51

## 2017-11-23 NOTE — ED PROVIDER NOTES
"Subjective   HPI Comments: 46-year-old male comes in with chief complaint left \"finger laceration\" is prior to arrival.  Patient states he was moving objects from a storage shed when he cut his left index finger on a broken piece of glass.  Patient does state his tetanus immunization is not up-to-date.  Denies any other injuries at this time.     Patient is a 46 y.o. male presenting with skin laceration.   History provided by:  Patient   used: No    Laceration   Location:  Finger  Finger laceration location:  L index finger  Length:  0.5  Depth:  Cutaneous  Bleeding: venous    Time since incident:  1 day  Laceration mechanism:  Broken glass  Pain details:     Quality:  Aching    Severity:  Moderate    Timing:  Constant    Progression:  Worsening  Foreign body present:  No foreign bodies  Worsened by:  Nothing  Tetanus status:  Out of date  Associated symptoms: no fever, no focal weakness, no numbness, no rash and no redness        Review of Systems   Constitutional: Negative for fever.   Skin: Positive for wound. Negative for rash.   Neurological: Negative for focal weakness.   All other systems reviewed and are negative.      Past Medical History:   Diagnosis Date   • Allergic    • Anxiety    • Arthritis    • Asthma    • Body piercing     REPORTS CYLICONE IN EARS   • Clotting disorder 2004    had a knee surgery   • Depression    • DVT (deep venous thrombosis)     RIGHT RIGHT KNEE AFTER SURGERY YEARS AGO IN 2001 OR 2004   • Gastric ulcer    • GERD (gastroesophageal reflux disease)    • H/O migraine    • H/O sleep apnea     REPORTS DIAGNOSED WITH SLEEP APNEA AND COULDN'T STAND TO WEAR THE MACHINE   • Headache    • Heart attack     REPORTS \"LIGHT HEART ATTACK A LONG TIME AGO\"  \"EARLY 90'S\"   • History of seizures     REPORTS LAST EPISODE WAS AROUND 1995.   • Hostility    • Hyperlipidemia    • Hypertension    • Knee pain, acute     Left   • Low back pain    • Migraine    • MRSA (methicillin " resistant Staphylococcus aureus)     REPORTS LAST TESTED + 2004. WAS TREATED HE REPORTS.  RIGHT ARM, RIGHT KNEE.   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    • Ciprofloxacin    • Contrast Dye    • Fish-Derived Products Hives   • Mobic [Meloxicam]    • Penicillins    • Prednisone    • Robitussin Cough+ Chest Max St  [Dextromethorphan-Guaifenesin]    • Sulfa Antibiotics    • Zoloft [Sertraline Hcl] Hives and Itching   • Gabapentin Rash   • Keflex [Cephalexin] Rash   • Metoprolol Rash   • Peanut-Containing Drug Products Rash   • Shrimp (Diagnostic) Rash       Past Surgical History:   Procedure Laterality Date   • BACK SURGERY     • BRAIN SURGERY  1986    Tumor removal    • CARDIAC SURGERY      CARDIAC CATH REPORTED AS 2 MONTHS AGO IN [place]. REPORTS NO STENTS PLACED.   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       Family History   Problem Relation Age of Onset   • Diabetes Mother    • Hypertension Mother    • Stroke Mother    • Diabetes Father    • Skin cancer Father    • Hypertension Father    • Heart attack Father    • Diabetes Brother    • Hypertension Brother    • Heart disease Maternal Aunt    • Heart disease Maternal Uncle    • Heart disease Paternal Aunt    • Heart disease Paternal Uncle    • Heart disease Maternal Grandmother    • Heart disease Maternal Grandfather    • Heart disease  Paternal Grandmother    • Heart disease Paternal Grandfather        Social History     Social History   • Marital status:      Spouse name: N/A   • Number of children: 2   • Years of education: 12     Occupational History   • DISABLED      Social History Main Topics   • Smoking status: Current Every Day Smoker     Packs/day: 1.00     Years: 7.00     Types: Cigars, Cigarettes     Start date: 5/5/2010   • Smokeless tobacco: Never Used   • Alcohol use No   • Drug use: No   • Sexual activity: Defer     Other Topics Concern   • None     Social History Narrative           Objective   Physical Exam   Constitutional: He is oriented to person, place, and time. He appears well-developed and well-nourished.   HENT:   Head: Normocephalic.   Right Ear: External ear normal.   Left Ear: External ear normal.   Nose: Nose normal.   Mouth/Throat: Oropharynx is clear and moist.   Eyes: Conjunctivae and EOM are normal. Pupils are equal, round, and reactive to light.   Neck: Normal range of motion. Neck supple. No thyromegaly present.   Cardiovascular: Normal rate, regular rhythm, normal heart sounds and intact distal pulses.    Pulmonary/Chest: Effort normal and breath sounds normal.   Abdominal: Soft. Bowel sounds are normal.   Musculoskeletal:        Left hand: He exhibits laceration. He exhibits normal range of motion.        Hands:  0.5 sq laceration, bleeding controlled at this time. No foreign bodies noted.    Lymphadenopathy:     He has no cervical adenopathy.   Neurological: He is alert and oriented to person, place, and time. He has normal reflexes.   Skin: Skin is warm and dry. Bruising and ecchymosis noted.   Psychiatric: He has a normal mood and affect. His behavior is normal. Judgment and thought content normal.   Nursing note and vitals reviewed.      Laceration Repair  Date/Time: 11/23/2017 6:49 PM  Performed by: AJITH VALERIO  Authorized by: ANJU PINEDA     Consent:     Consent obtained:  Verbal     Consent given by:  Patient    Risks discussed:  Pain    Alternatives discussed:  No treatment  Anesthesia (see MAR for exact dosages):     Anesthesia method:  None  Laceration details:     Location:  Finger    Finger location:  L index finger    Length (cm):  0.5  Exploration:     Wound extent: no areolar tissue violation noted, no fascia violation noted, no foreign bodies/material noted, no nerve damage noted and no tendon damage noted      Contaminated: no    Treatment:     Area cleansed with:  Saline and Hibiclens    Visualized foreign bodies/material removed: no    Skin repair:     Repair method:  Tissue adhesive  Approximation:     Approximation:  Close  Post-procedure details:     Patient tolerance of procedure:  Tolerated well, no immediate complications             ED Course  ED Course                  MDM  Number of Diagnoses or Management Options  Laceration of index finger of left hand without complication, initial encounter: new and requires workup  Risk of Complications, Morbidity, and/or Mortality  Presenting problems: moderate  Diagnostic procedures: moderate  Management options: moderate    Patient Progress  Patient progress: stable      Final diagnoses:   Laceration of index finger of left hand without complication, initial encounter            Ac Golden PA-C  11/23/17 5545

## 2017-11-24 DIAGNOSIS — K21.9 GASTROESOPHAGEAL REFLUX DISEASE WITHOUT ESOPHAGITIS: ICD-10-CM

## 2017-11-24 DIAGNOSIS — J01.30 ACUTE NON-RECURRENT SPHENOIDAL SINUSITIS: ICD-10-CM

## 2017-11-24 DIAGNOSIS — E78.00 PURE HYPERCHOLESTEROLEMIA: ICD-10-CM

## 2017-11-27 ENCOUNTER — HOSPITAL ENCOUNTER (OUTPATIENT)
Dept: PHYSICAL THERAPY | Facility: HOSPITAL | Age: 47
Setting detail: THERAPIES SERIES
Discharge: HOME | End: 2017-11-27

## 2017-11-27 DIAGNOSIS — M94.262 CHONDROMALACIA OF LEFT KNEE: Primary | ICD-10-CM

## 2017-11-27 PROCEDURE — 97110 THERAPEUTIC EXERCISES: CPT | Performed by: PHYSICAL THERAPIST

## 2017-11-27 PROCEDURE — G0283 ELEC STIM OTHER THAN WOUND: HCPCS | Performed by: PHYSICAL THERAPIST

## 2017-11-27 RX ORDER — RANITIDINE 300 MG/1
TABLET ORAL
Qty: 60 TABLET | Refills: 5 | Status: SHIPPED | OUTPATIENT
Start: 2017-11-27 | End: 2018-06-01 | Stop reason: SDUPTHER

## 2017-11-27 RX ORDER — PRAVASTATIN SODIUM 40 MG
TABLET ORAL
Qty: 30 TABLET | Refills: 5 | Status: SHIPPED | OUTPATIENT
Start: 2017-11-27 | End: 2018-06-01 | Stop reason: SDUPTHER

## 2017-11-27 RX ORDER — AZELASTINE 1 MG/ML
SPRAY, METERED NASAL
Qty: 30 ML | Refills: 5 | Status: SHIPPED | OUTPATIENT
Start: 2017-11-27 | End: 2018-05-04 | Stop reason: ALTCHOICE

## 2017-11-27 NOTE — THERAPY TREATMENT NOTE
"    Outpatient Physical Therapy Ortho Treatment Note   [place]     Patient Name: Jaquan Mkcay  : 1970  MRN: [id number]  Today's Date: 2017      Visit Date: 2017    Visit Dx:    ICD-10-CM ICD-9-CM   1. Chondromalacia of left knee M94.262 717.7       Patient Active Problem List   Diagnosis   • GERD (gastroesophageal reflux disease)   • Arthritis   • Hypertension   • Seizure disorder   • Hyperlipidemia   • Anxiety   • Varicocele   • Varicocele present on ultrasound of scrotum   • Obesity (BMI 30.0-34.9)   • Chronic bilateral low back pain with left-sided sciatica   • Seasonal allergic rhinitis due to pollen   • Mild intermittent asthma with acute exacerbation   • Precordial pain   • Dysuria   • Congenital coronary artery anomaly   • BMI 35.0-35.9,adult   • Chondromalacia, knee        Past Medical History:   Diagnosis Date   • Allergic    • Anxiety    • Arthritis    • Asthma    • Body piercing     REPORTS CYLICONE IN EARS   • Clotting disorder     had a knee surgery   • Depression    • DVT (deep venous thrombosis)     RIGHT RIGHT KNEE AFTER SURGERY YEARS AGO IN  OR    • Gastric ulcer    • GERD (gastroesophageal reflux disease)    • H/O migraine    • H/O sleep apnea     REPORTS DIAGNOSED WITH SLEEP APNEA AND COULDN'T STAND TO WEAR THE MACHINE   • Headache    • Heart attack     REPORTS \"LIGHT HEART ATTACK A LONG TIME AGO\"  \"EARLY \"   • History of seizures     REPORTS LAST EPISODE WAS AROUND .   • Hostility    • Hyperlipidemia    • Hypertension    • Knee pain, acute     Left   • Low back pain    • Migraine    • MRSA (methicillin resistant Staphylococcus aureus)     REPORTS LAST 2004. WAS TREATED HE REPORTS.  RIGHT ARM, RIGHT KNEE.   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    • Obesity    • Poor historian    • [place] spotted fever    • Tattoo    • Wears glasses         Past Surgical History:   Procedure Laterality Date   • BACK SURGERY     • BRAIN SURGERY      Tumor removal    • " CARDIAC SURGERY      CARDIAC CATH REPORTED AS 2 MONTHS AGO IN [place]. REPORTS NO STENTS PLACED.   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 yes  -BE    Pre-Treatment Pain Level 8  -BE    Post-Treatment Pain Level 8  -BE      User Key  (r) = Recorded By, (t) = Taken By, (c) = Cosigned By    Initials Name Provider Type    BE Hanna Burks, PT Physical Therapist                            PT Assessment/Plan       11/27/17 1315       PT Assessment    Assessment Comments Patient tolerated today's session, with no reports of increased pain.  Patient performed ther ex, with focus on improving ROM and LE strength.  Patient progressed to include increased repetitions of QS and HS.  Frequent verbal cues required for correct form, with rest breaks provided as necessary.  Patient concluded session with MH/ESTIM, followed by US to the left knee; no adverse reactions noted.  He will continue to be progressed per his tolerance and POC.  -BE     PT Plan    PT Plan Comments Progress per patient's tolerance and POC.  -BE       User Key  (r) = Recorded By, (t) = Taken By, (c) = Cosigned By    Initials Name Provider Type    BE Hanna Burks, PT Physical Therapist                Modalities       11/27/17 1100          Moist Heat    MH Applied Yes   No redness following MH  -BE      Location  left knee  -BE      Rx Minutes 10 mins  -BE      MH Prior to Rx Yes  -BE      Ultrasound [id number]    Location med/lateral knee   No skin irritation following US  -BE      Rx Minutes 8 minutes  -BE      Duty Cycle 50  -BE      Frequency --   3.3MHz  -BE      Intensity - Wts/cm 1.2  -BE      ELECTRICAL STIMULATION    Attended/Unattended Unattended   No irritation noted following estim  -BE      Stimulation Type IFC  -BE      Max mAmp --   per the patient's tolerance  -BE      Location/Electrode Placement/Other left knee  -BE      Rx Minutes 10 mins  -BE        User Key  (r) = Recorded By, (t) = Taken By, (c) = Cosigned By    Initials Name Provider Type    BE Hanna Burks PT Physical Therapist                Exercises       11/27/17 1100          Subjective Comments    Subjective Comments Patient reports that he has continued to experience 8/10 pain; he notes that he will be seeing MD on 11/30/2017  -BE      Subjective Pain    Able to rate subjective pain? yes  -BE      Pre-Treatment Pain Level 8  -BE      Post-Treatment Pain Level 8  -BE      Exercise 1    Exercise Name 1 QS, HS, SAQ, SLR, Ball squeeze, Knee extension stretch with bolster  -BE      Cueing 1 Verbal;Tactile;Demo  -BE      Time (Minutes) 1 25 min  -BE        User Key  (r) = Recorded By, (t) = Taken By, (c) = Cosigned By    Initials Name Provider Type    BE Hanna Burks PT Physical Therapist                                   Therapy Education       11/27/17 1130          Therapy Education    Given HEP;Symptoms/condition management;Pain management  -BE      Program Reinforced  -BE      How Provided Verbal;Demonstration  -BE      Provided to Patient  -BE      Level of Understanding Verbalized;Teach back education performed  -BE        User Key  (r) = Recorded By, (t) = Taken By, (c) = Cosigned By    Initials Name Provider Type    BE Hanna Burks PT Physical Therapist                Time Calculation:   Start Time: 1055  Stop Time:  1158  Time Calculation (min): 63 min    Therapy Charges for Today     Code Description Service Date Service Provider Modifiers Qty    80003950609 HC PT ELECTRICAL STIM UNATTENDED 11/27/2017 Hanna Burks, PT  1    63444410955 HC PT THER PROC EA 15 MIN 11/27/2017 Hanna Burks, PT GP 2                    Hanna Burks, PT  11/27/2017

## 2017-11-28 ENCOUNTER — TELEPHONE (OUTPATIENT)
Dept: FAMILY MEDICINE CLINIC | Facility: CLINIC | Age: 47
End: 2017-11-28

## 2017-11-29 ENCOUNTER — HOSPITAL ENCOUNTER (OUTPATIENT)
Dept: PHYSICAL THERAPY | Facility: HOSPITAL | Age: 47
Setting detail: THERAPIES SERIES
Discharge: HOME | End: 2017-11-29

## 2017-11-29 DIAGNOSIS — M94.262 CHONDROMALACIA OF LEFT KNEE: Primary | ICD-10-CM

## 2017-11-29 DIAGNOSIS — M25.562 ACUTE PAIN OF LEFT KNEE: ICD-10-CM

## 2017-11-29 DIAGNOSIS — M25.562 LEFT KNEE PAIN, UNSPECIFIED CHRONICITY: Primary | ICD-10-CM

## 2017-11-29 PROCEDURE — 97035 APP MDLTY 1+ULTRASOUND EA 15: CPT | Performed by: PHYSICAL THERAPIST

## 2017-11-29 PROCEDURE — 97110 THERAPEUTIC EXERCISES: CPT | Performed by: PHYSICAL THERAPIST

## 2017-11-29 PROCEDURE — G0283 ELEC STIM OTHER THAN WOUND: HCPCS | Performed by: PHYSICAL THERAPIST

## 2017-11-29 NOTE — PROGRESS NOTES
"    Outpatient Physical Therapy Ortho Treatment Note   [place]     Patient Name: Jaquan Mckay  : 1970  MRN: [id number]  Today's Date: 2017      Visit Date: 2017    Visit Dx:    ICD-10-CM ICD-9-CM   1. Chondromalacia of left knee M94.262 717.7   2. Acute pain of left knee M25.562 719.46       Patient Active Problem List   Diagnosis   • GERD (gastroesophageal reflux disease)   • Arthritis   • Hypertension   • Seizure disorder   • Hyperlipidemia   • Anxiety   • Varicocele   • Varicocele present on ultrasound of scrotum   • Obesity (BMI 30.0-34.9)   • Chronic bilateral low back pain with left-sided sciatica   • Seasonal allergic rhinitis due to pollen   • Mild intermittent asthma with acute exacerbation   • Precordial pain   • Dysuria   • Congenital coronary artery anomaly   • BMI 35.0-35.9,adult   • Chondromalacia, knee        Past Medical History:   Diagnosis Date   • Allergic    • Anxiety    • Arthritis    • Asthma    • Body piercing     REPORTS CYLICONE IN EARS   • Clotting disorder     had a knee surgery   • Depression    • DVT (deep venous thrombosis)     RIGHT RIGHT KNEE AFTER SURGERY YEARS AGO IN  [place]    • Gastric ulcer    • GERD (gastroesophageal reflux disease)    • H/O migraine    • H/O sleep apnea     REPORTS DIAGNOSED WITH SLEEP APNEA AND COULDN'T STAND TO WEAR THE MACHINE   • Headache    • Heart attack     REPORTS \"LIGHT HEART ATTACK A LONG TIME AGO\"  \"EARLY \"   • History of seizures     REPORTS LAST EPISODE WAS AROUND .   • Hostility    • Hyperlipidemia    • Hypertension    • Knee pain, acute     Left   • Low back pain    • Migraine    • MRSA (methicillin resistant Staphylococcus aureus)     REPORTS LAST TESTED 2004. WAS TREATED HE REPORTS.  RIGHT ARM, RIGHT KNEE.   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    • Obesity    • Poor historian    • [place] spotted fever    • Tattoo    • Wears glasses         Past Surgical History:   Procedure Laterality Date   • BACK SURGERY     • " "BRAIN SURGERY  1986    Tumor removal    • CARDIAC SURGERY      CARDIAC CATH REPORTED AS 2 MONTHS AGO IN [place]. REPORTS NO STENTS PLACED.   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 He stated he went to the emergency department at [facility] on 11-27-17 and was told he had a \"knee sprain\".   -AD    Subjective Pain    Able to rate subjective pain? yes  -AD    Pre-Treatment Pain Level 8  -AD    Post-Treatment Pain Level 7  -AD      11/27/17 1100    Subjective Comments    Subjective Comments Patient reports that he has continued to experience 8/10 pain; he notes that he will be seeing MD on 11/30/2017  -BE    Subjective Pain    Able to rate subjective pain? yes  -BE    Pre-Treatment Pain Level 8  -BE    Post-Treatment Pain Level 8  -BE      User Key  (r) = Recorded By, (t) = Taken By, (c) = Cosigned By    Initials Name Provider Type    AD Ashley Claudene Dalton, PT Physical Therapist    BE Hanna Burks, PT Physical Therapist                            PT Assessment/Plan       11/29/17 1501       PT Assessment    Assessment Comments The patient was progressed during today's session with the introduction of the lower extremity bicycle. He tolerated exercises well, with minimal reports of pain throughout the session. No skin irritation was observed following modalities during today's session. Modalities included MH combined with IFC at the beginning of the session and therapeutic ultrasound at the conclusion " "of the session. He will be progressed as tolerated.  -AD     PT Plan    PT Plan Comments Progress as tolerated per POC.  -AD       User Key  (r) = Recorded By, (t) = Taken By, (c) = Cosigned By    Initials Name Provider Type    AD Ashley Claudene Dalton, PT Physical Therapist                Modalities       11/29/17 1400          Moist Heat    MH Applied Yes   No skin irritation observed following MH with IFC  -AD      Location left knee  -AD      Rx Minutes 15 mins  -AD      MH Prior to Rx Yes  -AD      Ultrasound [id number]    Location med/lateral knee   No skin irritation following US  -AD      Rx Minutes 8 minutes  -AD      Duty Cycle 50  -AD      Frequency --   3.3MHz  -AD      Intensity - Wts/cm 1.2  -AD      ELECTRICAL STIMULATION    Attended/Unattended Unattended   No irritation noted following estim  -AD      Stimulation Type IFC  -AD      Max mAmp --   per the patient's tolerance  -AD      Location/Electrode Placement/Other left knee  -AD      Rx Minutes 15 mins  -AD        User Key  (r) = Recorded By, (t) = Taken By, (c) = Cosigned By    Initials Name Provider Type    AD Ashley Claudene Dalton, PT Physical Therapist                Exercises       11/29/17 1400          Subjective Comments    Subjective Comments Pt reports ongoing left knee pain. He stated he went to the emergency department at [facility] on 11-27-17 and was told he had a \"knee sprain\".   -AD      Subjective Pain    Able to rate subjective pain? yes  -AD      Pre-Treatment Pain Level 8  -AD      Post-Treatment Pain Level 7  -AD      Exercise 1    Exercise Name 1 QS, HS, SLR, LAQ, ball squeeze, knee ext stretch on bolster, RTB knee flexion, RTB hip abduction, LE bicycle  -AD      Cueing 1 Verbal;Tactile  -AD      Time (Minutes) 1 33 minutes  -AD        User Key  (r) = Recorded By, (t) = Taken By, (c) = Cosigned By    Initials Name Provider Type    AD Ashley Claudene Dalton, PT Physical Therapist                                   Therapy " Education       11/29/17 1501          Therapy Education    Given HEP;Symptoms/condition management;Pain management  -AD      Program Reinforced  -AD      How Provided Verbal;Demonstration  -AD      Provided to Patient  -AD      Level of Understanding Verbalized;Teach back education performed  -AD        User Key  (r) = Recorded By, (t) = Taken By, (c) = Cosigned By    Initials Name Provider Type    AD Ashley Claudene Dalton, PT Physical Therapist                Time Calculation:   Start Time: 1100  Stop Time: 1200  Time Calculation (min): 60 min    Therapy Charges for Today     Code Description Service Date Service Provider Modifiers Qty    01704271805 HC PT ELECTRICAL STIM UNATTENDED 11/29/2017 Ashley Claudene Dalton, PT  1    13089665871 HC PT THER SUPP EA 15 MIN 11/29/2017 Ashley Claudene Dalton, PT GP 1    63735819989 HC PT ULTRASOUND EA 15 MIN 11/29/2017 Ashley Claudene Dalton, PT GP 1    64591641195 HC PT THER PROC EA 15 MIN 11/29/2017 Ashley Claudene Dalton, PT GP 2                    Ashley Claudene Dalton, PT  11/29/2017

## 2017-12-04 ENCOUNTER — HOSPITAL ENCOUNTER (OUTPATIENT)
Dept: PHYSICAL THERAPY | Facility: HOSPITAL | Age: 47
Setting detail: THERAPIES SERIES
Discharge: HOME | End: 2017-12-04

## 2017-12-04 DIAGNOSIS — M94.262 CHONDROMALACIA OF LEFT KNEE: Primary | ICD-10-CM

## 2017-12-04 PROCEDURE — 97035 APP MDLTY 1+ULTRASOUND EA 15: CPT

## 2017-12-04 PROCEDURE — G0283 ELEC STIM OTHER THAN WOUND: HCPCS

## 2017-12-04 PROCEDURE — 97110 THERAPEUTIC EXERCISES: CPT

## 2017-12-04 NOTE — THERAPY TREATMENT NOTE
"    Outpatient Physical Therapy Ortho Treatment Note   [place]     Patient Name: Jaquan Mckay  : 1970  MRN: [id number]  Today's Date: 2017      Visit Date: 2017    Visit Dx:    ICD-10-CM ICD-9-CM   1. Chondromalacia of left knee M94.262 717.7       Patient Active Problem List   Diagnosis   • GERD (gastroesophageal reflux disease)   • Arthritis   • Hypertension   • Seizure disorder   • Hyperlipidemia   • Anxiety   • Varicocele   • Varicocele present on ultrasound of scrotum   • Obesity (BMI 30.0-34.9)   • Chronic bilateral low back pain with left-sided sciatica   • Seasonal allergic rhinitis due to pollen   • Mild intermittent asthma with acute exacerbation   • Precordial pain   • Dysuria   • Congenital coronary artery anomaly   • BMI 35.0-35.9,adult   • Chondromalacia, knee        Past Medical History:   Diagnosis Date   • Allergic    • Anxiety    • Arthritis    • Asthma    • Body piercing     REPORTS CYLICONE IN EARS   • Clotting disorder     had a knee surgery   • Depression    • DVT (deep venous thrombosis)     RIGHT RIGHT KNEE AFTER SURGERY YEARS AGO IN  [place]    • Gastric ulcer    • GERD (gastroesophageal reflux disease)    • H/O migraine    • H/O sleep apnea     REPORTS DIAGNOSED WITH SLEEP APNEA AND COULDN'T STAND TO WEAR THE MACHINE   • Headache    • Heart attack     REPORTS \"LIGHT HEART ATTACK A LONG TIME AGO\"  \"EARLY \"   • History of seizures     REPORTS LAST EPISODE WAS AROUND .   • Hostility    • Hyperlipidemia    • Hypertension    • Knee pain, acute     Left   • Low back pain    • Migraine    • MRSA (methicillin resistant Staphylococcus aureus)     REPORTS LAST 2004. WAS TREATED HE REPORTS.  RIGHT ARM, RIGHT KNEE.   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    • Obesity    • Poor historian    • [place] spotted fever    • Tattoo    • Wears glasses         Past Surgical History:   Procedure Laterality Date   • BACK SURGERY     • BRAIN SURGERY      Tumor removal    • " CARDIAC SURGERY      CARDIAC CATH REPORTED AS 2 MONTHS AGO IN [place]. REPORTS NO STENTS PLACED.   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 Patient reports of going to his MD on 12/18. Patient states of tightness and pain in the back of his L) knee.  -AC    Subjective Pain    Able to rate subjective pain? yes  -AC    Pre-Treatment Pain Level 6  -AC    Post-Treatment Pain Level 5  -AC      User Key  (r) = Recorded By, (t) = Taken By, (c) = Cosigned By    Initials Name Provider Type    RISHI Martin, PTA Physical Therapy Assistant                            PT Assessment/Plan       12/04/17 1432       PT Assessment    Assessment Comments Patient arrived to therapy with no L) knee brace on today, patient educated on importance of wearing brace, patient verbalized understanding. Patient tolerated treatment session well with rest breaks taken as needed by the patient. Educated patient to perform ther ex per his tolerance, patient verbalized understanding. Only 10 SLR performed due to soreness in the L) knee. No adverse reactions with modalities or treatment. Ultrasound performed to medial/lateral L) knee to help decrease swelling in L) knee.   -AC     PT Plan    PT Plan Comments Continue per PT's POC, progress per the patient's tolerance.  -AC       User Key  (r) = Recorded By, (t) = Taken By, (c) = Cosigned By    Initials Name Provider Type    RISHI Garcia  Tanya Martin PTA Physical Therapy Assistant                Modalities       12/04/17 1400          Moist Heat    MH Applied Yes   No redness noted following moist heat  -AC      Location left knee  -AC      Rx Minutes 15 mins  -AC      MH Prior to Rx Yes  -AC      Ultrasound 71494    Location med/lateral knee  -AC      Rx Minutes 8 minutes  -AC      Duty Cycle 50  -AC      Frequency --   3.3MHz  -AC      Intensity - Wts/cm 1.2  -AC      ELECTRICAL STIMULATION    Attended/Unattended Unattended   No irritation noted following estim  -AC      Stimulation Type IFC  -AC      Max mAmp --   per the patient's tolerance  -AC      Location/Electrode Placement/Other left knee  -AC      Rx Minutes 15 mins  -AC        User Key  (r) = Recorded By, (t) = Taken By, (c) = Cosigned By    Initials Name Provider Type    RISHI Martin PTA Physical Therapy Assistant                Exercises       12/04/17 1400          Subjective Comments    Subjective Comments Patient states that he is continuing to have numbness on the outside of his L) knee. Patient reports of going to his MD on 12/18. Patient states of tightness and pain in the back of his L) knee.  -AC      Subjective Pain    Able to rate subjective pain? yes  -AC      Pre-Treatment Pain Level 6  -AC      Post-Treatment Pain Level 5  -AC      Exercise 1    Exercise Name 1 QS x25, HS 10x2, SLR x10, SAQ 15x2, ball squeeze 10x2, knee ext stretch with bolster x 2min, knee flex with RTB x15  -AC      Cueing 1 Verbal;Tactile  -AC      Time (Minutes) 1 30 minutes  -AC        User Key  (r) = Recorded By, (t) = Taken By, (c) = Cosigned By    Initials Name Provider Type    RISHI Martin PTA Physical Therapy Assistant                                   Therapy Education       12/04/17 1432          Therapy Education    Given HEP;Symptoms/condition management;Pain management  -AC      Program Reinforced  -AC      How Provided Verbal;Demonstration  -AC      Provided to  Patient  -AC      Level of Understanding Verbalized;Demonstrated  -AC        User Key  (r) = Recorded By, (t) = Taken By, (c) = Cosigned By    Initials Name Provider Type    AC Radha Martin PTA Physical Therapy Assistant                Time Calculation:   Start Time: 1105  Stop Time: 1205  Time Calculation (min): 60 min    Therapy Charges for Today     Code Description Service Date Service Provider Modifiers Qty    15753190488 HC PT THER PROC EA 15 MIN 12/4/2017 Radha Martin PTA GP 2    41894519461 HC PT ELECTRICAL STIM UNATTENDED 12/4/2017 Radha Martin PTA  1    52513460313 HC PT THER SUPP EA 15 MIN 12/4/2017 Radha Martin PTA GP 1    [id number] HC PT ULTRASOUND EA 15 MIN 12/4/2017 Radha Martin PTA GP 1                    Radha Martin PTA  12/4/2017

## 2017-12-06 ENCOUNTER — HOSPITAL ENCOUNTER (OUTPATIENT)
Dept: PHYSICAL THERAPY | Facility: HOSPITAL | Age: 47
Setting detail: THERAPIES SERIES
Discharge: HOME | End: 2017-12-06

## 2017-12-06 DIAGNOSIS — M94.262 CHONDROMALACIA OF LEFT KNEE: Primary | ICD-10-CM

## 2017-12-06 PROCEDURE — G0283 ELEC STIM OTHER THAN WOUND: HCPCS | Performed by: PHYSICAL THERAPIST

## 2017-12-06 PROCEDURE — 97110 THERAPEUTIC EXERCISES: CPT | Performed by: PHYSICAL THERAPIST

## 2017-12-06 PROCEDURE — 97035 APP MDLTY 1+ULTRASOUND EA 15: CPT | Performed by: PHYSICAL THERAPIST

## 2017-12-06 NOTE — THERAPY RE-EVALUATION
"    Outpatient Physical Therapy Ortho Re-Evaluation   [place]     Patient Name: Jaquan Mckay  : 1970  MRN: [id number]  Today's Date: 2017      Visit Date: 2017    Patient Active Problem List   Diagnosis   • GERD (gastroesophageal reflux disease)   • Arthritis   • Hypertension   • Seizure disorder   • Hyperlipidemia   • Anxiety   • Varicocele   • Varicocele present on ultrasound of scrotum   • Obesity (BMI 30.0-34.9)   • Chronic bilateral low back pain with left-sided sciatica   • Seasonal allergic rhinitis due to pollen   • Mild intermittent asthma with acute exacerbation   • Precordial pain   • Dysuria   • Congenital coronary artery anomaly   • BMI 35.0-35.9,adult   • Chondromalacia, knee        Past Medical History:   Diagnosis Date   • Allergic    • Anxiety    • Arthritis    • Asthma    • Body piercing     REPORTS CYLICONE IN EARS   • Clotting disorder     had a knee surgery   • Depression    • DVT (deep venous thrombosis)     RIGHT RIGHT KNEE AFTER SURGERY YEARS AGO IN  [place]    • Gastric ulcer    • GERD (gastroesophageal reflux disease)    • H/O migraine    • H/O sleep apnea     REPORTS DIAGNOSED WITH SLEEP APNEA AND COULDN'T STAND TO WEAR THE MACHINE   • Headache    • Heart attack     REPORTS \"LIGHT HEART ATTACK A LONG TIME AGO\"  \"EARLY 'S\"   • History of seizures     REPORTS LAST EPISODE WAS AROUND .   • Hostility    • Hyperlipidemia    • Hypertension    • Knee pain, acute     Left   • Low back pain    • Migraine    • MRSA (methicillin resistant Staphylococcus aureus)     REPORTS LAST TESTED + . WAS TREATED HE REPORTS.  RIGHT ARM, RIGHT KNEE.   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    • Obesity    • Poor historian    • [place] spotted fever    • Tattoo    • Wears glasses         Past Surgical History:   Procedure Laterality Date   • BACK SURGERY     • BRAIN SURGERY      Tumor removal    • CARDIAC SURGERY      CARDIAC CATH REPORTED AS 2 MONTHS AGO IN [place]. REPORTS NO STENTS " PLACED.   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 Chondromalacia of left knee M94.262 717.7                 PT Ortho       12/06/17 1300    Subjective Comments    Subjective Comments Pt reports improvements in left knee pain, however states he continues to have pain, swelling, and difficulties with daily activities.  -AD    Subjective Pain    Able to rate subjective pain? yes  -AD    Pre-Treatment Pain Level 8  -AD    Post-Treatment Pain Level 7  -AD    Myotomal Screen- Lower Quarter Clearing    Hip flexion (L2) Bilateral:;5 (Normal)  -AD    Knee extension (L3) Left:;4 (Good);Right:;4+ (Good +)  -AD    Ankle DF (L4) Left:;4 (Good);Right:;4+ (Good +)  -AD    Knee flexion (S2) Left:;4 (Good);Right:;4+ (Good +)  -AD    Left Knee    Extension/Flexion ROM Details 3-113  -AD    RLE Quick Girth (cm)    Mid patella 45 cm  -AD    LLE Quick Girth (cm)    Mid patella 46 cm  -AD      12/04/17 1400    Subjective Comments    Subjective Comments Patient states that he is continuing to have numbness on the outside of his L) knee. Patient reports of going to his MD on 12/18. Patient states of tightness and pain in the back of his L) knee.  -AC    Subjective Pain    Able to rate subjective pain? yes  -AC    Pre-Treatment Pain Level 6  -AC    Post-Treatment Pain Level 5  -AC      User Key  (r) = Recorded By, (t) = Taken By, (c) = Cosigned By    Initials Name Provider Type    AD Ashley Claudene Dalton, PT Physical Therapist    AC Radha Martin, PTA Physical Therapy Assistant                             Therapy Education       12/06/17 1308          Therapy Education    Given HEP;Symptoms/condition management;Pain management  -AD      Program Reinforced  -AD      How Provided Verbal;Demonstration  -AD      Provided to Patient  -AD      Level of Understanding Verbalized;Demonstrated  -AD        User Key  (r) = Recorded By, (t) = Taken By, (c) = Cosigned By    Initials Name Provider Type    AD Ashley Claudene Dalton, PT Physical Therapist                PT OP Goals       12/06/17 1300       PT Short Term Goals    STG Date to Achieve 12/20/17  -AD     STG 1 Patient will be independent/compliant with HEP.  -AD     STG 1 Progress Met  -AD     STG 2 Left Knee ROM will improve to at least 5-90 degrees to allow for greater ease with ADLs.  -AD     STG 2 Progress Met  -AD     STG 2 Progress Comments 3-113  -AD     STG 3 Patient will report pain no greater than 7/10 when performing self-care activities.  -AD     STG 3 Progress Not Met  -AD     STG 3 Progress Comments Per pt report, 9/10 when donning socks  -AD     Long Term Goals    LTG Date to Achieve 01/05/18  -AD     LTG 1 Patient will report pain no greater than 5/10 when ambulating with improved weightbearing fro 20 minutes to shop for groceries.  -AD     LTG 1 Progress Not Met  -AD     LTG 1 Progress Comments 8/10, per pt report  -AD     LTG 2 Left Knee ROM will improve to at least 0-115 to allow for improved gait pattern.  -AD     LTG 2 Progress Not Met  -AD     LTG 2 Progress Comments 3-113  -AD     LTG 3 LE strength will improve to at least 4/5 to prevent injury.  -AD     LTG 3 Progress Met  -AD     LTG 4 LEFS will improve by at least 10% to show improved functional mobility.  -AD     LTG 4 Progress Not Met  -AD     LTG 4 Progress Comments LEFS worsened from a score of 49/80 to 31/80.  -AD     Time Calculation    PT Goal Re-Cert Due Date 01/05/18  -AD       User Key  (r) = Recorded By, (t) = Taken By, (c) = Cosigned By    Initials Name Provider Type    JOSE JUAN Garcia  Claudene Dalton, PT Physical Therapist                PT Assessment/Plan       12/06/17 1311       PT Assessment    Functional Limitations Decreased safety during functional activities;Impaired gait;Limitations in community activities;Limitation in home management;Performance in self-care ADL;Performance in leisure activities;Limitations in functional capacity and performance  -AD     Impairments Balance;Gait;Range of motion;Sensation;Muscle strength;Pain;Posture;Joint mobility  -AD     Assessment Comments The patient has achieved 3/7 established physical therapy goals. He demonstrated improvements in left knee range of motion and strength, however LEFS score decreased from 49/80 to 31/80. He would benefit from skilled physical therapy to address ongoing LLE weakness, impaired ROM, gait, and functional limitations/impairments. He tolerated today's session well, with reports of decreased left knee pain upon completion. No skin irritation was observed following modalities. He will be progressed as tolerated.  -AD     Please refer to paper survey for additional self-reported information Yes  -AD     Rehab Potential Good  -AD     Patient/caregiver participated in establishment of treatment plan and goals Yes  -AD     Patient would benefit from skilled therapy intervention Yes  -AD     PT Plan    PT Frequency 2x/week  -AD     Predicted Duration of Therapy Intervention (days/wks) 4 weeks  -AD     Planned CPT's? PT EVAL HIGH COMPLEXITY: 96045;PT RE-EVAL: 89458;PT NEUROMUSC RE-EDUCATION EA 15 MIN: 26938;PT MANUAL THERAPY EA 15 MIN: 78537;PT THER PROC EA 15 MIN: 31848;PT GAIT TRAINING EA 15 MIN: 91793;PT THER ACT EA 15 MIN: 07112;PT SELF CARE/HOME MGMT/TRAIN EA 15: 46192;PT ULTRASOUND EA 15 MIN: 45537;PT ELECTRICAL STIM UNATTEND: ;PT HOT/COLD PACK WC NONMCARE: 73507;PT IONTOPHORESIS EA 15 MIN: 17666;PT THER SUPP EA 15 MIN  -AD     PT Plan Comments Progress as tolerated per POC.  -AD       User Key  (r) = Recorded By,  (t) = Taken By, (c) = Cosigned By    Initials Name Provider Type    AD Ashley Claudene Dalton, PT Physical Therapist                Modalities       12/06/17 1300          Moist Heat    MH Applied Yes   No skin irritatation observed following moist heat with IFC  -AD      Location left knee  -AD      Rx Minutes 15 mins  -AD      MH Prior to Rx Yes  -AD      Ice    Ice Applied Yes   No skin irritation observed  -AD      Location left knee  -AD      Rx Minutes Other:   5 minutes  -AD      Ice S/P Rx Yes  -AD      Ultrasound 47514    Location med/lateral knee  -AD      Rx Minutes 8 minutes  -AD      Duty Cycle 50  -AD      Frequency --   3.3MHz  -AD      Intensity - Wts/cm 1.2   No skin irritation observed  -AD      ELECTRICAL STIMULATION    Attended/Unattended Unattended   No irritation noted following estim  -AD      Stimulation Type IFC  -AD      Max mAmp --   per the patient's tolerance  -AD      Location/Electrode Placement/Other left knee  -AD      Rx Minutes 15 mins  -AD        User Key  (r) = Recorded By, (t) = Taken By, (c) = Cosigned By    Initials Name Provider Type    AD Ashley Claudene Dalton, PT Physical Therapist              Exercises       12/06/17 1300          Subjective Comments    Subjective Comments Pt reports improvements in left knee pain, however states he continues to have pain, swelling, and difficulties with daily activities.  -AD      Subjective Pain    Able to rate subjective pain? yes  -AD      Pre-Treatment Pain Level 8  -AD      Post-Treatment Pain Level 7  -AD      Exercise 1    Exercise Name 1 QS, HS, SLR, SAQ, knee extension on bolster, knee flexion with RTB, LE bicycle, seated march  -AD      Cueing 1 Verbal;Tactile  -AD      Time (Minutes) 1 32 minutes  -AD        User Key  (r) = Recorded By, (t) = Taken By, (c) = Cosigned By    Initials Name Provider Type    AD Ashley Claudene Dalton, PT Physical Therapist                              Outcome Measures       12/06/17 1300           Lower Extremity Functional Index    Any of your usual work, housework or school activities 3  -AD      Your usual hobbies, recreational or sporting activities 2  -AD      Getting into or out of the bath 1  -AD      Walking between rooms 4  -AD      Putting on your shoes or socks 1  -AD      Squatting 0  -AD      Lifting an object, like a bag of groceries from the floor 3  -AD      Performing light activities around your home 3  -AD      Performing heavy activities around your home 0  -AD      Getting into or out of a car 3  -AD      Walking 2 blocks 3  -AD      Walking a mile 2  -AD      Going up or down 10 stairs (about 1 flight of stairs) 0  -AD      Standing for 1 hour 1  -AD      Sitting for 1 hour 1  -AD      Running on even ground 0  -AD      Running on uneven ground 0  -AD      Making sharp turns while running fast 0  -AD      Hopping 0  -AD      Rolling over in bed 4  -AD      Total 31  -AD      Functional Assessment    Outcome Measure Options Lower Extremity Functional Scale (LEFS)  -AD        User Key  (r) = Recorded By, (t) = Taken By, (c) = Cosigned By    Initials Name Provider Type    AD Ashley Claudene Dalton, PT Physical Therapist            Time Calculation:   Start Time: 1045  Stop Time: 1155  Time Calculation (min): 70 min     Therapy Charges for Today     Code Description Service Date Service Provider Modifiers Qty    41865290771 HC PT ELECTRICAL STIM UNATTENDED 12/6/2017 Ashley Claudene Dalton, PT  1    72848920193 HC PT ULTRASOUND EA 15 MIN 12/6/2017 Ashley Claudene Dalton, PT GP 1    73922316876 HC PT THER PROC EA 15 MIN 12/6/2017 Ashley Claudene Dalton, PT GP 2    09901786439 HC PT THER SUPP EA 15 MIN 12/6/2017 Ashley Claudene Dalton, PT GP 1          PT G-Codes  Outcome Measure Options: Lower Extremity Functional Scale (LEFS)         Ashley Claudene Dalton, GABRIELE  12/6/2017

## 2017-12-08 ENCOUNTER — TRANSCRIBE ORDERS (OUTPATIENT)
Dept: PHYSICAL THERAPY | Facility: HOSPITAL | Age: 47
End: 2017-12-08

## 2017-12-08 DIAGNOSIS — M94.262 CHONDROMALACIA OF LEFT KNEE: Primary | ICD-10-CM

## 2017-12-11 ENCOUNTER — HOSPITAL ENCOUNTER (OUTPATIENT)
Dept: PHYSICAL THERAPY | Facility: HOSPITAL | Age: 47
Setting detail: THERAPIES SERIES
Discharge: HOME | End: 2017-12-11

## 2017-12-11 DIAGNOSIS — M94.262 CHONDROMALACIA OF LEFT KNEE: Primary | ICD-10-CM

## 2017-12-11 PROCEDURE — 97110 THERAPEUTIC EXERCISES: CPT

## 2017-12-11 PROCEDURE — G0283 ELEC STIM OTHER THAN WOUND: HCPCS

## 2017-12-11 NOTE — THERAPY TREATMENT NOTE
"    Outpatient Physical Therapy Ortho Treatment Note   [place]     Patient Name: Jaquan Mckay  : 1970  MRN: [id number]  Today's Date: 2017      Visit Date: 2017    Visit Dx:    ICD-10-CM ICD-9-CM   1. Chondromalacia of left knee M94.262 717.7       Patient Active Problem List   Diagnosis   • GERD (gastroesophageal reflux disease)   • Arthritis   • Hypertension   • Seizure disorder   • Hyperlipidemia   • Anxiety   • Varicocele   • Varicocele present on ultrasound of scrotum   • Obesity (BMI 30.0-34.9)   • Chronic bilateral low back pain with left-sided sciatica   • Seasonal allergic rhinitis due to pollen   • Mild intermittent asthma with acute exacerbation   • Precordial pain   • Dysuria   • Congenital coronary artery anomaly   • BMI 35.0-35.9,adult   • Chondromalacia, knee        Past Medical History:   Diagnosis Date   • Allergic    • Anxiety    • Arthritis    • Asthma    • Body piercing     REPORTS CYLICONE IN EARS   • Clotting disorder     had a knee surgery   • Depression    • DVT (deep venous thrombosis)     RIGHT RIGHT KNEE AFTER SURGERY [place]GO [place]    • Gastric ulcer    • GERD (gastroesophageal reflux disease)    • H/O migraine    • H/O sleep apnea     REPORTS DIAGNOSED WITH SLEEP APNEA AND COULDN'T STAND TO WEAR THE MACHINE   • Headache    • Heart attack     REPORTS \"LIGHT HEART ATTACK A LONG TIME AGO\"  \"EARLY \"   • History of seizures     REPORTS LAST EPISODE WAS AROUND .   • Hostility    • Hyperlipidemia    • Hypertension    • Knee pain, acute     Left   • Low back pain    • Migraine    • MRSA (methicillin resistant Staphylococcus aureus)     REPORTS LAST 2004. WAS TREATED HE REPORTS.  RIGHT ARM, RIGHT KNEE.   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    • Obesity    • Poor historian    • [place] spotted fever    • Tattoo    • Wears glasses         Past Surgical History:   Procedure Laterality Date   • BACK SURGERY     • BRAIN SURGERY      Tumor removal    • " CARDIAC SURGERY      CARDIAC CATH REPORTED AS 2 MONTHS AGO IN [place]. REPORTS NO STENTS PLACED.   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 Patient reports that he is having soreness in the back of his L) leg. Patient reports of a f/u with ortho MD on thursday.  -AC    Subjective Pain    Able to rate subjective pain? yes  -AC    Pre-Treatment Pain Level 7  -AC    Post-Treatment Pain Level 7  -AC      User Key  (r) = Recorded By, (t) = Taken By, (c) = Cosigned By    Initials Name Provider Type    RISHI Martin PTA Physical Therapy Assistant                            PT Assessment/Plan       12/11/17 1405       PT Assessment    Assessment Comments Ther ex added back on to treatment session. Patient tolerated treatment session well with rest breaks taken as needed by the patient. New ther ex added per the patient's tolerance, patient demonstrated and understood new ther ex with soreness noted during SLR and knee ext on bolster. No adverse reactions with modalities or treatment. Pain remained the same from pre to post treatment.   -AC     PT Plan    PT Plan Comments Continue per PT's POC, progress per the patient's tolerance.  -AC       User Key  (r) = Recorded By, (t) = Taken By, (c) = Cosigned By    Initials Name Provider Type    RISHI Martin PTA Physical Therapy Assistant                Modalities       12/11/17 1200          Moist Heat      Applied Yes   No redness noted following moist heat  -AC      Location left knee  -AC      Rx Minutes 10 mins  -AC      MH Prior to Rx Yes  -AC      Ice    Ice Applied Yes  -AC      Location left knee  -AC      Rx Minutes Other:   8 minutes  -AC      Ice S/P Rx Yes  -AC      ELECTRICAL STIMULATION    Attended/Unattended Unattended   No irritation noted following estim  -AC      Stimulation Type IFC  -AC      Max mAmp --   per the patient's tolerance  -AC      Location/Electrode Placement/Other left knee  -AC      Rx Minutes 10 mins  -AC        User Key  (r) = Recorded By, (t) = Taken By, (c) = Cosigned By    Initials Name Provider Type    RISHI Martin PTA Physical Therapy Assistant                Exercises       12/11/17 1200          Subjective Comments    Subjective Comments Patient states he is continuing to have numbness on the outside of his L) knee. Patient reports that he is having soreness in the back of his L) leg. Patient reports of a f/u with ortho MD on thursday.  -AC      Subjective Pain    Able to rate subjective pain? yes  -AC      Pre-Treatment Pain Level 7  -AC      Post-Treatment Pain Level 7  -AC      Exercise 1    Exercise Name 1 QS 15x2, SAQ 15x2, SLR x10, heel slides 10x2, knee ext on bolster 2 min, ball squeeze 10x2, LAQ 10x2, knee flex with RTB x10, seated march 10x2  -AC      Cueing 1 Verbal;Tactile  -AC      Time (Minutes) 1 35 minutes  -AC        User Key  (r) = Recorded By, (t) = Taken By, (c) = Cosigned By    Initials Name Provider Type    RISHI Martin PTA Physical Therapy Assistant                                   Therapy Education       12/11/17 1304          Therapy Education    Given HEP;Symptoms/condition management;Pain management;Posture/body mechanics  -AC      Program New  -AC      How Provided Verbal;Demonstration  -AC      Provided to Patient  -AC      Level of Understanding Verbalized;Demonstrated  -AC        User Key  (r) = Recorded By, (t) = Taken  By, (c) = Cosigned By    Initials Name Provider Type    AC Radha Martin, NEIL Physical Therapy Assistant                Time Calculation:   Start Time: 1105  Stop Time: 1202  Time Calculation (min): 57 min    Therapy Charges for Today     Code Description Service Date Service Provider Modifiers Qty    88771222407  PT THER PROC EA 15 MIN 12/11/2017 Radha Martin PTA GP 2    [id number]  PT ELECTRICAL STIM UNATTENDED 12/11/2017 Radha Martin PTA  1                    Radha Martin PTA  12/11/2017

## 2017-12-12 DIAGNOSIS — M25.562 LEFT KNEE PAIN, UNSPECIFIED CHRONICITY: Primary | ICD-10-CM

## 2017-12-13 ENCOUNTER — OFFICE VISIT (OUTPATIENT)
Dept: FAMILY MEDICINE CLINIC | Facility: CLINIC | Age: 47
End: 2017-12-13

## 2017-12-13 ENCOUNTER — HOSPITAL ENCOUNTER (OUTPATIENT)
Dept: PHYSICAL THERAPY | Facility: HOSPITAL | Age: 47
Setting detail: THERAPIES SERIES
Discharge: HOME | End: 2017-12-13

## 2017-12-13 VITALS
HEART RATE: 81 BPM | SYSTOLIC BLOOD PRESSURE: 120 MMHG | WEIGHT: 229 LBS | TEMPERATURE: 98.3 F | OXYGEN SATURATION: 98 % | HEIGHT: 67 IN | DIASTOLIC BLOOD PRESSURE: 80 MMHG | BODY MASS INDEX: 35.94 KG/M2

## 2017-12-13 DIAGNOSIS — G89.29 CHRONIC BILATERAL LOW BACK PAIN WITH LEFT-SIDED SCIATICA: ICD-10-CM

## 2017-12-13 DIAGNOSIS — M94.262 CHONDROMALACIA OF LEFT KNEE: Primary | ICD-10-CM

## 2017-12-13 DIAGNOSIS — J06.9 ACUTE URI: ICD-10-CM

## 2017-12-13 DIAGNOSIS — M54.42 CHRONIC BILATERAL LOW BACK PAIN WITH LEFT-SIDED SCIATICA: ICD-10-CM

## 2017-12-13 DIAGNOSIS — M19.90 ARTHRITIS: ICD-10-CM

## 2017-12-13 PROCEDURE — G0283 ELEC STIM OTHER THAN WOUND: HCPCS

## 2017-12-13 PROCEDURE — 99214 OFFICE O/P EST MOD 30 MIN: CPT | Performed by: NURSE PRACTITIONER

## 2017-12-13 PROCEDURE — 97110 THERAPEUTIC EXERCISES: CPT

## 2017-12-13 RX ORDER — PHENTERMINE HYDROCHLORIDE 37.5 MG/1
37.5 TABLET ORAL
Qty: 30 TABLET | Refills: 0 | Status: SHIPPED | OUTPATIENT
Start: 2017-12-13 | End: 2017-12-13 | Stop reason: SDUPTHER

## 2017-12-13 RX ORDER — HYDROCODONE BITARTRATE AND ACETAMINOPHEN 7.5; 325 MG/1; MG/1
1 TABLET ORAL 2 TIMES DAILY PRN
Qty: 60 TABLET | Refills: 0 | Status: SHIPPED | OUTPATIENT
Start: 2017-12-13 | End: 2017-12-13 | Stop reason: SDUPTHER

## 2017-12-13 RX ORDER — PHENTERMINE HYDROCHLORIDE 37.5 MG/1
37.5 TABLET ORAL
Qty: 30 TABLET | Refills: 0 | Status: SHIPPED | OUTPATIENT
Start: 2017-12-13 | End: 2018-01-08 | Stop reason: SDUPTHER

## 2017-12-13 RX ORDER — AZITHROMYCIN 250 MG/1
TABLET, FILM COATED ORAL
COMMUNITY
Start: 2017-12-11 | End: 2018-01-08

## 2017-12-13 RX ORDER — HYDROCODONE BITARTRATE AND ACETAMINOPHEN 7.5; 325 MG/1; MG/1
1 TABLET ORAL 2 TIMES DAILY PRN
Qty: 60 TABLET | Refills: 0 | Status: SHIPPED | OUTPATIENT
Start: 2017-12-13 | End: 2018-01-08 | Stop reason: SDUPTHER

## 2017-12-13 NOTE — PROGRESS NOTES
"Subjective   Jaquan Mckay is a 47 y.o. male.     Chief Complaint   Patient presents with   • Knee Pain       History of Present Illness     Sinus complaint-just started a z pack per allergy specialist.  He is coughing without production.  Nasal congestion that increases at night with some difficulty breathing due to nasal congestion.  He reports he is SOA more than his usual.  He reports he has awoken a few times gasping for air and \"smothering at night\".    Foot coldness-reports \"had 3 pair of socks on yesterday\".  He reports he has some areas of bruising on his legs like bruising.  He reports he does have some RLS.  He reports the sensation is some in his lower portion of legs.  Not at goal.   Adipex-would like to resume his weight loss meds as his knee is doing better and he will be able to be more active.    The following portions of the patient's history were reviewed and updated as appropriate: allergies, current medications, past family history, past medical history, past social history, past surgical history and problem list.    Review of Systems   Constitutional: Positive for fatigue. Negative for appetite change and fever.   HENT: Positive for congestion, ear pain, sinus pressure and sore throat (mostly in the AM). Negative for postnasal drip, rhinorrhea and tinnitus.    Eyes: Negative for pain and visual disturbance (wears glasses.  recent eye exam with new glasses).   Respiratory: Positive for cough, chest tightness, shortness of breath and wheezing.    Cardiovascular: Negative for chest pain and palpitations.   Gastrointestinal: Negative for abdominal pain, constipation, diarrhea and vomiting.   Genitourinary: Positive for dysuria (chronic) and testicular pain (chronic). Negative for hematuria and urgency.   Musculoskeletal: Positive for arthralgias, back pain (with radiation to BLE \"down to ankles\" ) and myalgias.   Neurological: Positive for weakness (BLE), numbness (and tingling with \"electricity\" " "sensation in his legs) and headaches. Negative for dizziness.   Psychiatric/Behavioral: Positive for sleep disturbance. Negative for dysphoric mood and suicidal ideas. The patient is not nervous/anxious.    All other systems reviewed and are negative.      Objective     /80 (BP Location: Right arm, Patient Position: Sitting, Cuff Size: Adult)  Pulse 81  Temp 98.3 °F (36.8 °C) (Tympanic)   Ht 170.2 cm (67.01\")  Wt 104 kg (229 lb)  SpO2 98%  BMI 35.86 kg/m2    Physical Exam   Constitutional: He is oriented to person, place, and time. He appears well-developed and well-nourished.   HENT:   Head: Normocephalic and atraumatic.   Right Ear: External ear and ear canal normal. Tympanic membrane is erythematous.   Left Ear: External ear and ear canal normal. Tympanic membrane is erythematous.   Nose: Mucosal edema and rhinorrhea present.   Mouth/Throat: Oropharyngeal exudate and posterior oropharyngeal erythema present.   Eyes: Conjunctivae and EOM are normal. Pupils are equal, round, and reactive to light. No scleral icterus.   Neck: Normal range of motion. Neck supple. No JVD present. No thyromegaly present.   Cardiovascular: Normal rate, regular rhythm and normal heart sounds.    No murmur heard.  Pulmonary/Chest: Effort normal. No respiratory distress. He has decreased breath sounds (bilateral upper lobes). He has no wheezes. He exhibits no tenderness.   Abdominal: Soft. Bowel sounds are normal. He exhibits no mass. There is no tenderness.   Musculoskeletal: He exhibits no edema.        Right elbow: Tenderness found. Medial epicondyle and olecranon process tenderness noted.        Left knee: He exhibits decreased range of motion and swelling (mild). He exhibits no ecchymosis. Tenderness found. Medial joint line and lateral joint line tenderness noted.        Lumbar back: He exhibits decreased range of motion, tenderness, pain and spasm. He exhibits no swelling.        Right hand: He exhibits tenderness (3rd " digit). He exhibits normal capillary refill.   Gait shuffling with limping noted.  More prominent on right side from behind      Skin Integrity  -  His right foot skin is intact.     Jaquan 's left foot skin is intact. .  Lymphadenopathy:        Head (right side): No submandibular adenopathy present.        Head (left side): No submandibular adenopathy present.     He has no cervical adenopathy.   Neurological: He is alert and oriented to person, place, and time. He has normal reflexes. No cranial nerve deficit. He exhibits normal muscle tone. Coordination normal.   Skin: Skin is warm and dry.   Psychiatric: His speech is normal and behavior is normal. Judgment and thought content normal. His mood appears not anxious. He is not actively hallucinating. Cognition and memory are normal. He exhibits a depressed mood. He expresses no homicidal and no suicidal ideation. He exhibits normal recent memory and normal remote memory. He is attentive.   Vitals reviewed.      Assessment/Plan     Problem List Items Addressed This Visit        Nervous and Auditory    Chronic bilateral low back pain with left-sided sciatica    Relevant Medications    HYDROcodone-acetaminophen (NORCO) 7.5-325 MG per tablet       Other    BMI 35.0-35.9,adult - Primary    Relevant Medications    phentermine (ADIPEX-P) 37.5 MG tablet      Other Visit Diagnoses     Acute URI        continue Z Pack that he is presently on    Relevant Medications    azithromycin (ZITHROMAX) 250 MG tablet      Dietary counseling provided:  Healthy food choices including fruits and vegetables, limit soda and junk foods, adequate water intake.  Increase in physical activity advised at least 30 minutes per day 3-5 days per week.  Continue under care of Ortho  Discussed patient may have some numbness in knee area related to recent surgical procedure.  Understands disease processes and need for medications.  Understands reasons for urgent and emergent care.  Patient (& family)  verbalized agreement for treatment plan.   CHAVEZ reviewed today and consistent.  Will refill prescribed controlled medication today.  Patient is aware they cannot receive narcotics from any other provider.  Risk and benefits of medication use has been reviewed.  History and physical exam exhibit continued safe and appropriate use of controlled substances.  UDS requested per [facility] while patient in office today.  Steam expectoration, warm compress to sinus, Saline PRN for moisture  RTC 1 month, sooner if needed.           This document has been electronically signed by:  BALDOMERO Thao, FNP-C

## 2017-12-13 NOTE — THERAPY TREATMENT NOTE
"    Outpatient Physical Therapy Ortho Treatment Note   [place]     Patient Name: Jaquan Mckay  : 1970  MRN: [id number]  Today's Date: 2017      Visit Date: 2017    Visit Dx:    ICD-10-CM ICD-9-CM   1. Chondromalacia of left knee M94.262 717.7       Patient Active Problem List   Diagnosis   • GERD (gastroesophageal reflux disease)   • Arthritis   • Hypertension   • Seizure disorder   • Hyperlipidemia   • Anxiety   • Varicocele   • Varicocele present on ultrasound of scrotum   • Obesity (BMI 30.0-34.9)   • Chronic bilateral low back pain with left-sided sciatica   • Seasonal allergic rhinitis due to pollen   • Mild intermittent asthma with acute exacerbation   • Precordial pain   • Dysuria   • Congenital coronary artery anomaly   • BMI 35.0-35.9,adult   • Chondromalacia, knee        Past Medical History:   Diagnosis Date   • Allergic    • Anxiety    • Arthritis    • Asthma    • Body piercing     REPORTS CYLICONE IN EARS   • Clotting disorder     had a knee surgery   • Depression    • DVT (deep venous thrombosis)     RIGHT RIGHT KNEE AFTER SURGERY YEARS AGO IN  OR    • Gastric ulcer    • GERD (gastroesophageal reflux disease)    • H/O migraine    • H/O sleep apnea     REPORTS DIAGNOSED WITH SLEEP APNEA AND COULDN'T STAND TO WEAR THE MACHINE   • Headache    • Heart attack     REPORTS \"LIGHT HEART ATTACK A LONG TIME AGO\"  \"EARLY \"   • History of seizures     REPORTS LAST EPISODE WAS AROUND .   • Hostility    • Hyperlipidemia    • Hypertension    • Knee pain, acute     Left   • Low back pain    • Migraine    • MRSA (methicillin resistant Staphylococcus aureus)     REPORTS LAST 2004. WAS TREATED HE REPORTS.  RIGHT ARM, RIGHT KNEE.   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    • Obesity    • Poor historian    • Carl [place] spotted fever    • Tattoo    • Wears glasses         Past Surgical History:   Procedure Laterality Date   • BACK SURGERY     • BRAIN SURGERY      Tumor removal    • " CARDIAC SURGERY      CARDIAC CATH REPORTED AS 2 MONTHS AGO IN [place]. REPORTS NO STENTS PLACED.   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  Pt request to abbreviate session due to conflicting appointment.   -ANNAMARIA    Subjective Pain    Able to rate subjective pain? yes  -ANNAMARIA    Pre-Treatment Pain Level 8  -ANNAMARIA    Post-Treatment Pain Level 6  -ANNAMARIA      12/11/17 1200    Subjective Comments    Subjective Comments Patient states he is continuing to have numbness on the outside of his L) knee. Patient reports that he is having soreness in the back of his L) leg. Patient reports of a f/u with ortho MD on thursday.  -AC    Subjective Pain    Able to rate subjective pain? yes  -AC    Pre-Treatment Pain Level 7  -AC    Post-Treatment Pain Level 7  -AC      User Key  (r) = Recorded By, (t) = Taken By, (c) = Cosigned By    Initials Name Provider Type    ANNAMARIA Lancaster, PTA Physical Therapy Assistant    RISHI Martin PTA Physical Therapy Assistant                            PT Assessment/Plan       12/13/17 1129       PT Assessment    Assessment Comments Patient tolerated treatment well today w/ reports of decreased pain at conclusion of session, 6/10.  Pt request to abbreviate session due to conflicting appointment.  Pt initiated today's session w/ MH and Estim to L) knee f/b therex as per written flow sheet.  Treatment concluded with pulsed US.  Pt continues to report increased  fatiuge w/ SLR.  Pt received cues as needed throughout session for improved feedback and for max benefit w/ activities.  No adverse reactions observed following modalities.   -ANNAMARIA     PT Plan    PT Plan Comments Continue with PT's POC and progress tx as tolerated by patient.  -ANNAMARIA       User Key  (r) = Recorded By, (t) = Taken By, (c) = Cosigned By    Initials Name Provider Type    ANNAMARIA Lancaster PTA Physical Therapy Assistant                Modalities       12/13/17 1100          Moist Heat    MH Applied Yes  -ANNAMARIA      Location left knee  -ANNAMARIA      Rx Minutes 15 mins  -ANNAMARIA      MH Prior to Rx Yes   w/ estim in supine position  -ANNAMARIA      Ice    Patient denies application of Ice Yes  -ANNAMARIA      Ultrasound 80619    Location med/lateral knee  -ANNAMARIA      Rx Minutes 8 minutes  -ANNAMARIA      Duty Cycle 50  -ANNAMARIA      Frequency --   3.3MHz  -ANNAMARIA      Intensity - Wts/cm 1.2  -ANNAMARIA      ELECTRICAL STIMULATION    Attended/Unattended Unattended   no skin irritation observed following estim  -ANNAMARIA      Stimulation Type IFC  -ANNAMARIA      Max mAmp --   as to pt's tolerance  -ANNAMARIA      Location/Electrode Placement/Other left knee  -ANNAMARIA      Rx Minutes 15 mins  -ANNAMARIA        User Key  (r) = Recorded By, (t) = Taken By, (c) = Cosigned By    Initials Name Provider Type    ANNAMARIA Lancaster PTA Physical Therapy Assistant                Exercises       12/13/17 1100          Subjective Comments    Subjective Comments Patient arrives to therapy with reports of 8/10 L) knee pain.  Pt request to abbreviate session due to conflicting appointment.   -ANNAMARIA      Subjective Pain    Able to rate subjective pain? yes  -ANNAMARIA      Pre-Treatment Pain Level 8  -ANNAMARIA      Post-Treatment Pain Level 6  -ANNAMARIA      Exercise 1    Exercise Name 1 QS 15x2, SAQ 15x2, SLR x10, heel slides 10x2, static knee ext on bolster x 2min, ball squeeze 10x2, LAQ 10x2, seated march 10x2, LE bike LV 2 (x8 min), knee flex w/ tband (red) 10x2  -ANNAMARIA      Cueing 1 Verbal;Tactile;Demo  -ANNAMARIA       Time (Minutes) 1 40 min  -ANNAMARIA        User Key  (r) = Recorded By, (t) = Taken By, (c) = Cosigned By    Initials Name Provider Type    ANNAMARIA Lancaster PTA Physical Therapy Assistant                                            Time Calculation:   Start Time: 0840  Stop Time: 0945  Time Calculation (min): 65 min    Therapy Charges for Today     Code Description Service Date Service Provider Modifiers Qty    94433168730 HC PT THER PROC EA 15 MIN 12/13/2017 Leighann Lancaster PTA GP 3    [id number] HC PT ELECTRICAL STIM UNATTENDED 12/13/2017 Leighann Lancaster PTA  1                    Leighann Brown. NEIL Lancaster  12/13/2017

## 2017-12-14 ENCOUNTER — OFFICE VISIT (OUTPATIENT)
Dept: ORTHOPEDIC SURGERY | Facility: CLINIC | Age: 47
End: 2017-12-14

## 2017-12-14 VITALS — BODY MASS INDEX: 35.99 KG/M2 | RESPIRATION RATE: 16 BRPM | WEIGHT: 229.28 LBS | HEIGHT: 67 IN

## 2017-12-14 DIAGNOSIS — M94.262 CHONDROMALACIA, KNEE, LEFT: Primary | ICD-10-CM

## 2017-12-14 PROCEDURE — 99024 POSTOP FOLLOW-UP VISIT: CPT | Performed by: ORTHOPAEDIC SURGERY

## 2017-12-14 NOTE — PROGRESS NOTES
"Subjective   Patient ID: Jaquan Mckay is a 47 y.o. male  Post-op and Follow-up of the Left Knee (Surgery Date: 10/20/17; Left Knee ATS)             History of Present Illness    Patient complains of anterolateral left knee pain 2 months status post chondroplasty patellofemoral compartment, did have a fall  directly on the left knee was seen in the [facility] er department, and found to have swelling discharged with recommendation to follow-up as scheduled    Review of Systems   Constitutional: Negative for diaphoresis, fever and unexpected weight change.   HENT: Negative for dental problem and sore throat.    Eyes: Negative for visual disturbance.   Respiratory: Negative for shortness of breath.    Cardiovascular: Negative for chest pain.   Gastrointestinal: Negative for abdominal pain, constipation, diarrhea, nausea and vomiting.   Genitourinary: Negative for difficulty urinating and frequency.   Musculoskeletal: Positive for arthralgias.   Neurological: Negative for headaches.   Hematological: Does not bruise/bleed easily.   All other systems reviewed and are negative.      Past Medical History:   Diagnosis Date   • Allergic    • Anxiety    • Arthritis    • Asthma    • Body piercing     REPORTS CYLICONE IN EARS   • Clotting disorder 2004    had a knee surgery   • Depression    • DVT (deep venous thrombosis)     RIGHT RIGHT KNEE AFTER SURGERY YEARS AGO IN 2001 OR 2004   • Gastric ulcer    • GERD (gastroesophageal reflux disease)    • H/O migraine    • H/O sleep apnea     REPORTS DIAGNOSED WITH SLEEP APNEA AND COULDN'T STAND TO WEAR THE MACHINE   • Headache    • Heart attack     REPORTS \"LIGHT HEART ATTACK A LONG TIME AGO\"  \"EARLY 90'S\"   • History of seizures     REPORTS LAST EPISODE WAS AROUND 1995.   • Hostility    • Hyperlipidemia    • Hypertension    • Knee pain, acute     Left   • Low back pain    • Migraine    • MRSA (methicillin resistant Staphylococcus aureus)     REPORTS LAST TESTED + 2004. WAS TREATED HE " REPORTS.  RIGHT ARM, RIGHT KNEE.   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    • Obesity    • Poor historian    • Carl [place] spotted fever    • Tattoo    • Wears glasses         Past Surgical History:   Procedure Laterality Date   • BACK SURGERY     • BRAIN SURGERY  1986    Tumor removal    • CARDIAC SURGERY      CARDIAC CATH REPORTED AS 2 MONTHS AGO IN [place]. REPORTS NO STENTS PLACED.   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       Family History   Problem Relation Age of Onset   • Diabetes Mother    • Hypertension Mother    • Stroke Mother    • Diabetes Father    • Skin cancer Father    • Hypertension Father    • Heart attack Father    • Diabetes Brother    • Hypertension Brother    • Heart disease Maternal Aunt    • Heart disease Maternal Uncle    • Heart disease Paternal Aunt    • Heart disease Paternal Uncle    • Heart disease Maternal Grandmother    • Heart disease Maternal Grandfather    • Heart disease Paternal Grandmother    • Heart disease Paternal Grandfather        Social History     Social History   • Marital status:      Spouse name: N/A   • Number of children: 2   • Years of education: 12     Occupational History   • DISABLED      Social History Main Topics   • Smoking status: Current Every Day Smoker     Packs/day: 1.00     Years: 7.00     Types: Cigars, Cigarettes     Start date: 5/5/2010   • Smokeless tobacco: Never Used   • Alcohol use No   • Drug use: No   • Sexual activity: Defer     Other Topics Concern   • Not on file     Social History Narrative       I have reviewed all of the above social hx, family hx, surgical hx,  "medications, allergies & ROS and confirm that it is accurate.    Allergies   Allergen Reactions   • Paxil [Paroxetine Hcl] Shortness Of Breath     Chest pain    • Isosorbide Nitrate Rash     Rash, hives, had to use inhaler.    • Ciprofloxacin    • Contrast Dye    • Fish-Derived Products Hives   • Mobic [Meloxicam]    • Penicillins    • Prednisone    • Robitussin Cough+ Chest Max St  [Dextromethorphan-Guaifenesin]    • Sulfa Antibiotics    • Zoloft [Sertraline Hcl] Hives and Itching   • Gabapentin Rash   • Keflex [Cephalexin] Rash   • Metoprolol Rash   • Peanut-Containing Drug Products Rash   • Shrimp (Diagnostic) Rash       Objective   Resp 16  Ht 170.2 cm (67.01\")  Wt 104 kg (229 lb 4.5 oz)  BMI 35.9 kg/m2   Physical Exam  Constitutional: Patient is oriented to person, place, and time. Patient appears well-developed and well-nourished.   HENT:Head: Normocephalic and atraumatic.   Eyes: EOM are normal. Pupils are equal, round, and reactive to light.   Neck: Normal range of motion. Neck supple.   Cardiovascular: Normal rate.    Pulmonary/Chest: Effort normal and breath sounds normal.   Abdominal: Soft.   Neurological: Patient is alert and oriented to person, place, and time.   Skin: Skin is warm and dry.   Psychiatric: Patient has a normal mood and affect.   Nursing note and vitals reviewed.       Ortho Exam   Left knee with no effusion full extension no redness erythema or ecchymosis minimal tenderness inferolateral patellar area minimal patellofemoral crepitus no mediolateral joint line pain calf supple Homans sign negative no instability extensor mechanism intact    Assessment/Plan   Review of Radiographic Studies:    No new imaging done today.      Procedures     Jaquan was seen today for post-op and follow-up.    Diagnoses and all orders for this visit:    Chondromalacia, knee, left  -     Ambulatory Referral to Physical Therapy Evaluate and treat     Physical therapy referral given      Recommendations/Plan: "   Referral: PT/OT 2-3 x wk x 4-6 wks    Patient agreeable to call or return sooner for any concerns.             Impression:  Left anterior knee pain status post chondroplasty patellofemoral chondromalacia  Plan:  Progress with therapy recheck in 2 months

## 2017-12-18 ENCOUNTER — HOSPITAL ENCOUNTER (OUTPATIENT)
Dept: PHYSICAL THERAPY | Facility: HOSPITAL | Age: 47
Setting detail: THERAPIES SERIES
Discharge: HOME | End: 2017-12-18

## 2017-12-18 DIAGNOSIS — M94.262 CHONDROMALACIA OF LEFT KNEE: Primary | ICD-10-CM

## 2017-12-18 DIAGNOSIS — M25.562 ACUTE PAIN OF LEFT KNEE: ICD-10-CM

## 2017-12-18 PROCEDURE — 97110 THERAPEUTIC EXERCISES: CPT | Performed by: PHYSICAL THERAPIST

## 2017-12-18 PROCEDURE — 97035 APP MDLTY 1+ULTRASOUND EA 15: CPT | Performed by: PHYSICAL THERAPIST

## 2017-12-18 PROCEDURE — G0283 ELEC STIM OTHER THAN WOUND: HCPCS | Performed by: PHYSICAL THERAPIST

## 2017-12-18 NOTE — THERAPY TREATMENT NOTE
"    Outpatient Physical Therapy Ortho Treatment Note   [place]     Patient Name: Jaquan Mckay  : 1970  MRN: [id number]  Today's Date: 2017      Visit Date: 2017    Visit Dx:    ICD-10-CM ICD-9-CM   1. Chondromalacia of left knee M94.262 717.7   2. Acute pain of left knee M25.562 719.46       Patient Active Problem List   Diagnosis   • GERD (gastroesophageal reflux disease)   • Arthritis   • Hypertension   • Seizure disorder   • Hyperlipidemia   • Anxiety   • Varicocele   • Varicocele present on ultrasound of scrotum   • Obesity (BMI 30.0-34.9)   • Chronic bilateral low back pain with left-sided sciatica   • Seasonal allergic rhinitis due to pollen   • Mild intermittent asthma with acute exacerbation   • Precordial pain   • Dysuria   • Congenital coronary artery anomaly   • BMI 35.0-35.9,adult   • Chondromalacia, knee        Past Medical History:   Diagnosis Date   • Allergic    • Anxiety    • Arthritis    • Asthma    • Body piercing     REPORTS CYLICONE IN EARS   • Clotting disorder     had a knee surgery   • Depression    • DVT (deep venous thrombosis)     RIGHT RIGHT KNEE AFTER SURGERY YEARS AGO IN  OR    • Gastric ulcer    • GERD (gastroesophageal reflux disease)    • H/O migraine    • H/O sleep apnea     REPORTS DIAGNOSED WITH SLEEP APNEA AND COULDN'T STAND TO WEAR THE MACHINE   • Headache    • Heart attack     REPORTS \"LIGHT HEART ATTACK A LONG TIME AGO\"  \"EARLY \"   • History of seizures     REPORTS LAST EPISODE WAS AROUND .   • Hostility    • Hyperlipidemia    • Hypertension    • Knee pain, acute     Left   • Low back pain    • Migraine    • MRSA (methicillin resistant Staphylococcus aureus)     REPORTS LAST TESTED 2004. WAS TREATED HE REPORTS.  RIGHT ARM, RIGHT KNEE.   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    • Obesity    • Poor historian    • Carl [place] spotted fever    • Tattoo    • Wears glasses         Past Surgical History:   Procedure Laterality Date   • BACK SURGERY     • " BRAIN SURGERY  1986    Tumor removal    • CARDIAC SURGERY      CARDIAC CATH REPORTED AS 2 MONTHS AGO IN [place]. REPORTS NO STENTS PLACED.   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  Added a few weight bearing activities today and he tolerated without adverse reactions.  he received ice and US at end of session.    -AT     PT Plan    PT Plan Comments cont poc  -AT       User Key  (r) = Recorded By, (t) = Taken By, (c) = Cosigned By    Initials Name Provider Type    AT Sintia Rodriguez, PT Physical Therapist                Modalities       12/18/17 1100          Subjective Pain    Able to rate subjective pain? yes  -AT      Pre-Treatment Pain Level 8  -AT      Post-Treatment Pain Level 6  -AT      Moist Heat    Location left knee  -AT      Rx Minutes 15 mins  -AT      MH Prior to Rx Yes   w/ estim in supine position  -AT      Ice    Location left knee  -AT      Rx Minutes Other:   8 minutes  -AT      Ice S/P Rx Yes  -AT      Patient denies application of Ice Yes  -AT      Ultrasound 13677    Location med/lateral knee  -AT      Rx Minutes 8 minutes  -AT      Duty Cycle 50  -AT      Frequency --   3.3MHz  -AT      Intensity - Wts/cm 1.2  -AT      ELECTRICAL STIMULATION    Attended/Unattended Unattended   no skin irritation observed following estim  -AT      Stimulation Type IFC  -AT      Max mAmp --   as to pt's tolerance   -AT      Location/Electrode Placement/Other left knee  -AT      Rx Minutes 15 mins  -AT        User Key  (r) = Recorded By, (t) = Taken By, (c) = Cosigned By    Initials Name Provider Type    AT Sintia Rodriguez PT Physical Therapist                Exercises       12/18/17 1200 12/18/17 1100       Subjective Comments    Subjective Comments  Pt arrives to therapy and reports his knee hurt over the weekend   -AT     Subjective Pain    Able to rate subjective pain?  yes  -AT     Pre-Treatment Pain Level  8  -AT     Post-Treatment Pain Level  6  -AT     Exercise 1    Exercise Name 1 QS 15x2, SAQ 15x2, SLR x10, heel slides 10x2, static knee ext on bolster x 2min, ball squeeze 10x2, LAQ 10x2, seated march 10x2, LE bike LV 2 (x8 min), knee flex w/ tband (red) 10x2, mini squat and stand HR x 10  -AT      Cueing 1 Verbal;Tactile;Demo  -AT      Time (Minutes) 1 40 min  -AT        User Key  (r) = Recorded By, (t) = Taken By, (c) = Cosigned By    Initials Name Provider Type    AT Sintia Rodriguez PT Physical Therapist                                            Time Calculation:   Start Time: 0850  Stop Time: 0950  Time Calculation (min): 60 min    Therapy Charges for Today     Code Description Service Date Service Provider Modifiers Qty    47081900677 HC PT THER PROC EA 15 MIN 12/18/2017 Sintia Rodriguez, PT GP 2    02904811352 HC PT ULTRASOUND EA 15 MIN 12/18/2017 Sintia Rodriguez PT GP 1    52174660389 HC PT ELECTRICAL STIM UNATTENDED 12/18/2017 Sintia Rodriguez PT  1                    Sintia Rodriguez PT  12/18/2017

## 2017-12-19 ENCOUNTER — APPOINTMENT (OUTPATIENT)
Dept: GENERAL RADIOLOGY | Facility: HOSPITAL | Age: 47
End: 2017-12-19

## 2017-12-19 ENCOUNTER — HOSPITAL ENCOUNTER (EMERGENCY)
Facility: HOSPITAL | Age: 47
Discharge: HOME | End: 2017-12-19
Attending: EMERGENCY MEDICINE | Admitting: EMERGENCY MEDICINE

## 2017-12-19 VITALS
WEIGHT: 227 LBS | BODY MASS INDEX: 35.63 KG/M2 | OXYGEN SATURATION: 99 % | SYSTOLIC BLOOD PRESSURE: 138 MMHG | TEMPERATURE: 98 F | HEIGHT: 67 IN | RESPIRATION RATE: 20 BRPM | HEART RATE: 80 BPM | DIASTOLIC BLOOD PRESSURE: 78 MMHG

## 2017-12-19 DIAGNOSIS — R07.9 CHEST PAIN, UNSPECIFIED TYPE: Primary | ICD-10-CM

## 2017-12-19 LAB
ALBUMIN SERPL-MCNC: 4.1 G/DL (ref 3.5–5)
ALBUMIN/GLOB SERPL: 1.1 G/DL (ref 1.5–2.5)
ALP SERPL-CCNC: 90 U/L (ref 40–129)
ALT SERPL W P-5'-P-CCNC: 29 U/L (ref 10–44)
ANION GAP SERPL CALCULATED.3IONS-SCNC: 7.2 MMOL/L (ref 3.6–11.2)
AST SERPL-CCNC: 27 U/L (ref 10–34)
BASOPHILS # BLD AUTO: 0.01 10*3/MM3 (ref 0–0.3)
BASOPHILS NFR BLD AUTO: 0.1 % (ref 0–2)
BILIRUB SERPL-MCNC: 0.3 MG/DL (ref 0.2–1.8)
BUN BLD-MCNC: 12 MG/DL (ref 7–21)
BUN/CREAT SERPL: 13.8 (ref 7–25)
CALCIUM SPEC-SCNC: 8.9 MG/DL (ref 7.7–10)
CHLORIDE SERPL-SCNC: 107 MMOL/L (ref 99–112)
CK MB SERPL-CCNC: 0.71 NG/ML (ref 0–5)
CK MB SERPL-RTO: 0.9 % (ref 0–3)
CK SERPL-CCNC: 81 U/L (ref 24–204)
CO2 SERPL-SCNC: 22.8 MMOL/L (ref 24.3–31.9)
CREAT BLD-MCNC: 0.87 MG/DL (ref 0.43–1.29)
DEPRECATED RDW RBC AUTO: 44.1 FL (ref 37–54)
EOSINOPHIL # BLD AUTO: 0.19 10*3/MM3 (ref 0–0.7)
EOSINOPHIL NFR BLD AUTO: 2.5 % (ref 0–5)
ERYTHROCYTE [DISTWIDTH] IN BLOOD BY AUTOMATED COUNT: 12.9 % (ref 11.5–14.5)
GFR SERPL CREATININE-BSD FRML MDRD: 95 ML/MIN/1.73
GLOBULIN UR ELPH-MCNC: 3.6 GM/DL
GLUCOSE BLD-MCNC: 100 MG/DL (ref 70–110)
HCT VFR BLD AUTO: 43.4 % (ref 42–52)
HGB BLD-MCNC: 15.3 G/DL (ref 14–18)
HOLD SPECIMEN: NORMAL
HOLD SPECIMEN: NORMAL
IMM GRANULOCYTES # BLD: 0.01 10*3/MM3 (ref 0–0.03)
IMM GRANULOCYTES NFR BLD: 0.1 % (ref 0–0.5)
LYMPHOCYTES # BLD AUTO: 2.33 10*3/MM3 (ref 1–3)
LYMPHOCYTES NFR BLD AUTO: 31.2 % (ref 21–51)
MCH RBC QN AUTO: 33 PG (ref 27–33)
MCHC RBC AUTO-ENTMCNC: 35.3 G/DL (ref 33–37)
MCV RBC AUTO: 93.5 FL (ref 80–94)
MONOCYTES # BLD AUTO: 1.06 10*3/MM3 (ref 0.1–0.9)
MONOCYTES NFR BLD AUTO: 14.2 % (ref 0–10)
NEUTROPHILS # BLD AUTO: 3.86 10*3/MM3 (ref 1.4–6.5)
NEUTROPHILS NFR BLD AUTO: 51.9 % (ref 30–70)
OSMOLALITY SERPL CALC.SUM OF ELEC: 273.7 MOSM/KG (ref 273–305)
PLATELET # BLD AUTO: 163 10*3/MM3 (ref 130–400)
PMV BLD AUTO: 10 FL (ref 6–10)
POTASSIUM BLD-SCNC: 3.7 MMOL/L (ref 3.5–5.3)
PROT SERPL-MCNC: 7.7 G/DL (ref 6–8)
RBC # BLD AUTO: 4.64 10*6/MM3 (ref 4.7–6.1)
SODIUM BLD-SCNC: 137 MMOL/L (ref 135–153)
TROPONIN I SERPL-MCNC: <0.006 NG/ML
TROPONIN I SERPL-MCNC: <0.006 NG/ML
WBC NRBC COR # BLD: 7.46 10*3/MM3 (ref 4.5–12.5)
WHOLE BLOOD HOLD SPECIMEN: NORMAL
WHOLE BLOOD HOLD SPECIMEN: NORMAL

## 2017-12-19 PROCEDURE — 99284 EMERGENCY DEPT VISIT MOD MDM: CPT

## 2017-12-19 PROCEDURE — 71020 HC CHEST PA AND LATERAL: CPT

## 2017-12-19 PROCEDURE — 82553 CREATINE MB FRACTION: CPT | Performed by: PHYSICIAN ASSISTANT

## 2017-12-19 PROCEDURE — 85025 COMPLETE CBC W/AUTO DIFF WBC: CPT | Performed by: EMERGENCY MEDICINE

## 2017-12-19 PROCEDURE — 84484 ASSAY OF TROPONIN QUANT: CPT | Performed by: PHYSICIAN ASSISTANT

## 2017-12-19 PROCEDURE — 71020 XR CHEST 2 VW: CPT | Performed by: RADIOLOGY

## 2017-12-19 PROCEDURE — 80053 COMPREHEN METABOLIC PANEL: CPT | Performed by: EMERGENCY MEDICINE

## 2017-12-19 PROCEDURE — 93005 ELECTROCARDIOGRAM TRACING: CPT | Performed by: EMERGENCY MEDICINE

## 2017-12-19 PROCEDURE — 36415 COLL VENOUS BLD VENIPUNCTURE: CPT

## 2017-12-19 PROCEDURE — 84484 ASSAY OF TROPONIN QUANT: CPT | Performed by: EMERGENCY MEDICINE

## 2017-12-19 PROCEDURE — 82550 ASSAY OF CK (CPK): CPT | Performed by: PHYSICIAN ASSISTANT

## 2017-12-19 PROCEDURE — 93010 ELECTROCARDIOGRAM REPORT: CPT | Performed by: INTERNAL MEDICINE

## 2017-12-19 RX ORDER — SODIUM CHLORIDE 0.9 % (FLUSH) 0.9 %
10 SYRINGE (ML) INJECTION AS NEEDED
Status: DISCONTINUED | OUTPATIENT
Start: 2017-12-19 | End: 2017-12-19 | Stop reason: HOSPADM

## 2017-12-19 RX ORDER — ASPIRIN 81 MG/1
81 TABLET, CHEWABLE ORAL DAILY
Status: DISCONTINUED | OUTPATIENT
Start: 2017-12-19 | End: 2017-12-19 | Stop reason: HOSPADM

## 2017-12-19 NOTE — ED PROVIDER NOTES
"Subjective   Patient is a 47 y.o. male presenting with chest pain.   History provided by:  Patient   used: No    Chest Pain   Pain location:  Substernal area  Pain quality: sharp    Pain radiates to:  Does not radiate  Pain severity:  Moderate  Onset quality:  Sudden  Duration:  4 weeks  Timing:  Constant  Progression:  Unchanged  Chronicity:  New  Relieved by:  Nothing  Worsened by:  Nothing  Ineffective treatments:  None tried  Risk factors: coronary artery disease, hypertension and male sex    Risk factors: no aortic disease, no birth control, no diabetes mellitus, no Warren-Danlos syndrome, no high cholesterol, no immobilization, no Marfan's syndrome, not obese, not pregnant, no prior DVT/PE, no smoking and no surgery        Review of Systems   Constitutional: Negative.    HENT: Negative.    Eyes: Negative.    Respiratory: Negative.    Cardiovascular: Positive for chest pain.   Gastrointestinal: Negative.    Endocrine: Negative.    Genitourinary: Negative.    Musculoskeletal: Negative.    Skin: Negative.    Allergic/Immunologic: Negative.    Neurological: Negative.    Hematological: Negative.    Psychiatric/Behavioral: Negative.    All other systems reviewed and are negative.      Past Medical History:   Diagnosis Date   • Allergic    • Anxiety    • Arthritis    • Asthma    • Body piercing     REPORTS CYLICONE IN EARS   • Clotting disorder 2004    had a knee surgery   • Depression    • DVT (deep venous thrombosis)     RIGHT RIGHT KNEE AFTER SURGERY YEARS AGO IN 2001 OR 2004   • Gastric ulcer    • GERD (gastroesophageal reflux disease)    • H/O migraine    • H/O sleep apnea     REPORTS DIAGNOSED WITH SLEEP APNEA AND COULDN'T STAND TO WEAR THE MACHINE   • Headache    • Heart attack     REPORTS \"LIGHT HEART ATTACK A LONG TIME AGO\"  \"EARLY 90'S\"   • History of seizures     REPORTS LAST EPISODE WAS AROUND 1995.   • Hostility    • Hyperlipidemia    • Hypertension    • Knee pain, acute     Left   • " Low back pain    • Migraine    • MRSA (methicillin resistant Staphylococcus aureus)     REPORTS LAST TESTED + 2004. WAS TREATED HE REPORTS.  RIGHT ARM, RIGHT KNEE.   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    • Ciprofloxacin    • Contrast Dye    • Fish-Derived Products Hives   • Mobic [Meloxicam]    • Penicillins    • Prednisone    • Robitussin Cough+ Chest Max St  [Dextromethorphan-Guaifenesin]    • Sulfa Antibiotics    • Zoloft [Sertraline Hcl] Hives and Itching   • Gabapentin Rash   • Keflex [Cephalexin] Rash   • Metoprolol Rash   • Peanut-Containing Drug Products Rash   • Shrimp (Diagnostic) Rash       Past Surgical History:   Procedure Laterality Date   • BACK SURGERY     • BRAIN SURGERY  1986    Tumor removal    • CARDIAC SURGERY      CARDIAC CATH REPORTED AS 2 MONTHS AGO IN [place]. REPORTS NO STENTS PLACED.   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       Family History   Problem Relation Age of Onset   • Diabetes Mother    • Hypertension Mother    • Stroke Mother    • Diabetes Father    • Skin cancer Father    • Hypertension Father    • Heart attack Father    • Diabetes Brother    • Hypertension Brother    • Heart disease Maternal Aunt    • Heart disease Maternal Uncle    • Heart disease Paternal Aunt    • Heart disease Paternal Uncle    • Heart disease Maternal Grandmother    • Heart  disease Maternal Grandfather    • Heart disease Paternal Grandmother    • Heart disease Paternal Grandfather        Social History     Social History   • Marital status:      Spouse name: N/A   • Number of children: 2   • Years of education: 12     Occupational History   • DISABLED      Social History Main Topics   • Smoking status: Current Every Day Smoker     Packs/day: 1.00     Years: 7.00     Types: Cigars, Cigarettes     Start date: 5/5/2010   • Smokeless tobacco: Never Used   • Alcohol use No   • Drug use: No   • Sexual activity: Defer     Other Topics Concern   • None     Social History Narrative           Objective   Physical Exam   Constitutional: He is oriented to person, place, and time. He appears well-developed and well-nourished.   HENT:   Head: Normocephalic and atraumatic.   Right Ear: External ear normal.   Left Ear: External ear normal.   Nose: Nose normal.   Mouth/Throat: Oropharynx is clear and moist.   Eyes: Conjunctivae and EOM are normal. Pupils are equal, round, and reactive to light.   Neck: Normal range of motion. Neck supple.   Cardiovascular: Normal rate, regular rhythm, normal heart sounds and intact distal pulses.    Pulmonary/Chest: Effort normal and breath sounds normal.   Abdominal: Soft. Bowel sounds are normal.   Musculoskeletal: Normal range of motion.   Neurological: He is alert and oriented to person, place, and time.   Skin: Skin is warm and dry.   Nursing note and vitals reviewed.      Procedures         ED Course  ED Course   Value Comment By Time   ECG 12 Lead 0026- Reviewed by Dr. Bey, rate 85, No ischemia GREG Hendrickson 12/19 0139    Pt took ASA PTA GREG Hendrickson 12/19 0209                  MDM    Final diagnoses:   Chest pain, unspecified type            GREG Hendrickson  12/19/17 0340

## 2017-12-20 ENCOUNTER — HOSPITAL ENCOUNTER (OUTPATIENT)
Dept: PHYSICAL THERAPY | Facility: HOSPITAL | Age: 47
Setting detail: THERAPIES SERIES
Discharge: HOME | End: 2017-12-20

## 2017-12-20 DIAGNOSIS — M94.262 CHONDROMALACIA OF LEFT KNEE: Primary | ICD-10-CM

## 2017-12-20 PROCEDURE — 97110 THERAPEUTIC EXERCISES: CPT

## 2017-12-20 PROCEDURE — G0283 ELEC STIM OTHER THAN WOUND: HCPCS

## 2017-12-20 NOTE — THERAPY TREATMENT NOTE
"    Outpatient Physical Therapy Ortho Treatment Note   [place]     Patient Name: Jaquan Mckay  : 1970  MRN: [id number]  Today's Date: 2017      Visit Date: 2017    Visit Dx:    ICD-10-CM ICD-9-CM   1. Chondromalacia of left knee M94.262 717.7       Patient Active Problem List   Diagnosis   • GERD (gastroesophageal reflux disease)   • Arthritis   • Hypertension   • Seizure disorder   • Hyperlipidemia   • Anxiety   • Varicocele   • Varicocele present on ultrasound of scrotum   • Obesity (BMI 30.0-34.9)   • Chronic bilateral low back pain with left-sided sciatica   • Seasonal allergic rhinitis due to pollen   • Mild intermittent asthma with acute exacerbation   • Precordial pain   • Dysuria   • Congenital coronary artery anomaly   • BMI 35.0-35.9,adult   • Chondromalacia, knee        Past Medical History:   Diagnosis Date   • Allergic    • Anxiety    • Arthritis    • Asthma    • Body piercing     REPORTS CYLICONE IN EARS   • Clotting disorder     had a knee surgery   • Depression    • DVT (deep venous thrombosis)     RIGHT RIGHT KNEE AFTER SURGERY YEARS AGO IN  [place]    • Gastric ulcer    • GERD (gastroesophageal reflux disease)    • H/O migraine    • H/O sleep apnea     REPORTS DIAGNOSED WITH SLEEP APNEA AND COULDN'T STAND TO WEAR THE MACHINE   • Headache    • Heart attack     REPORTS \"LIGHT HEART ATTACK A LONG TIME AGO\"  \"EARLY \"   • History of seizures     REPORTS LAST EPISODE WAS AROUND .   • Hostility    • Hyperlipidemia    • Hypertension    • Knee pain, acute     Left   • Low back pain    • Migraine    • MRSA (methicillin resistant Staphylococcus aureus)     REPORTS LAST 2004. WAS TREATED HE REPORTS.  RIGHT ARM, RIGHT KNEE.   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    • Obesity    • Poor historian    • Carl [place] spotted fever    • Tattoo    • Wears glasses         Past Surgical History:   Procedure Laterality Date   • BACK SURGERY     • BRAIN SURGERY      Tumor removal    • " CARDIAC SURGERY      CARDIAC CATH REPORTED AS 2 MONTHS AGO IN [place]. REPORTS NO STENTS PLACED.   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  Pt states he feels his knee pain is greater today due to the weather.  Pt states he continues to have intermittent popping in his knee, which he states has occured since surgery.   -ANNAMARIA    Subjective Pain    Able to rate subjective pain? yes  -ANNAMARIA    Pre-Treatment Pain Level 9  -ANNAMARIA    Post-Treatment Pain Level 8  -ANNAMARIA      User Key  (r) = Recorded By, (t) = Taken By, (c) = Cosigned By    Initials Name Provider Type    ANNAMARIA Lancaster, PTA Physical Therapy Assistant                            PT Assessment/Plan       12/20/17 0609       PT Assessment    Assessment Comments Patient completed today's session w/ reports of decreased L) knee pain at conclusion of session, 8/10.  Pt continues to report intermittent crepitus in the L) knee.  Pt educated to continue to monitor symptoms and notify MD if symptoms worsen or he feels concern.  Pt verbalized understanding.  Pt initiated today's tx w/ MH and Estim to L) knee for pain control f/b therex as per written flow sheet.  Treatment concluded w/ pulsed US.  Pt denies cryotherapy at conclusdion of session.  Additional ham stretch added to program today to assist w/ tightness.  Pt continues to progress w/ therapy as tolerated to address goals, and achieve maximum level of function.  Pt demonstrated good  "weight bearing upon conclusion of session.  Discussed pt's symptoms w/ supervising PT, Win Rodriguez.   -ANNAMARIA     PT Plan    PT Plan Comments Continue with PT's POC an dprogress tx as tolerated by patient.   -ANNAMARIA       User Key  (r) = Recorded By, (t) = Taken By, (c) = Cosigned By    Initials Name Provider Type    ANNAMARIA Lancaster PTA Physical Therapy Assistant                Modalities       12/20/17 1300          Moist Heat    MH Applied Yes  -ANNAMARIA      Location left knee  -ANNAMARIA      Rx Minutes 10 mins  -ANNAMARIA      MH Prior to Rx Yes   w/ estim in supine position  -ANNAMARIA      Ultrasound 69757    Location med/lateral knee  -ANNAMARIA      Rx Minutes 8 minutes  -ANNAMARIA      Duty Cycle 50  -ANNAMARIA      Frequency --   3.3Mhz  -ANNAMARIA      Intensity - Wts/cm 1.2  -ANNAMARIA      ELECTRICAL STIMULATION    Attended/Unattended Unattended   no skin irritation observed following estim  -ANNAMARIA      Stimulation Type IFC  -ANNAMARIA      Max mAmp --   as to pt's tolerance  -ANNAMARIA      Location/Electrode Placement/Other left knee  -ANNAMARIA      Rx Minutes 10 mins  -ANNAMARIA        User Key  (r) = Recorded By, (t) = Taken By, (c) = Cosigned By    Initials Name Provider Type    ANNAMARIA Lancaster PTA Physical Therapy Assistant                Exercises       12/20/17 1300          Subjective Comments    Subjective Comments Patient arrives to therapy w/ reports of 9/10 L) knee pain.  Pt states he feels his knee pain is greater today due to the weather.  Pt states he continues to have intermittent popping in his knee, which he states has occured since surgery.   -ANNAMARIA      Subjective Pain    Able to rate subjective pain? yes  -ANNAMARIA      Pre-Treatment Pain Level 9  -ANNAMARIA      Post-Treatment Pain Level 8  -ANNAMARIA      Exercise 1    Exercise Name 1 QS 15x2, SAQ 15x2, SLR 10x2, heel slides 10x2, static knee ext on bolster x 2min, LAQ 10x2, LE bike LV 3.5 (x6 min), SL hip abd 10x2, ball squeeze 10x2, ham stretch in long sitting 3x20\"   -ANNAMARIA      Cueing 1 Verbal;Demo;Tactile  -ANNAMARIA      Time " (Minutes) 1 40 min  -ANNAMARIA        User Key  (r) = Recorded By, (t) = Taken By, (c) = Cosigned By    Initials Name Provider Type    ANNAMARIA Lancaster PTA Physical Therapy Assistant                             Therapy Education  Given: HEP, Symptoms/condition management, Pain management, Fall prevention and home safety, Mobility training  Program: Reinforced  How Provided: Verbal, Demonstration  Provided to: Patient  Level of Understanding: Verbalized, Demonstrated              Time Calculation:   Start Time: 1300  Stop Time: 1405  Time Calculation (min): 65 min    Therapy Charges for Today     Code Description Service Date Service Provider Modifiers Qty    68960999494 HC PT THER PROC EA 15 MIN 12/20/2017 Leighann Lancaster PTA GP 3    51642561810 HC PT ELECTRICAL STIM UNATTENDED 12/20/2017 Leighann Lancaster PTA  1                    Leighann Lancaster PTA  12/20/2017

## 2017-12-27 ENCOUNTER — HOSPITAL ENCOUNTER (OUTPATIENT)
Dept: PHYSICAL THERAPY | Facility: HOSPITAL | Age: 47
Setting detail: THERAPIES SERIES
Discharge: HOME | End: 2017-12-27

## 2017-12-27 DIAGNOSIS — M19.90 ARTHRITIS: ICD-10-CM

## 2017-12-27 DIAGNOSIS — G89.29 CHRONIC BILATERAL LOW BACK PAIN WITH LEFT-SIDED SCIATICA: Primary | ICD-10-CM

## 2017-12-27 DIAGNOSIS — M54.42 CHRONIC BILATERAL LOW BACK PAIN WITH LEFT-SIDED SCIATICA: Primary | ICD-10-CM

## 2017-12-27 DIAGNOSIS — M94.262 CHONDROMALACIA OF LEFT KNEE: Primary | ICD-10-CM

## 2017-12-27 PROCEDURE — 97110 THERAPEUTIC EXERCISES: CPT | Performed by: PHYSICAL THERAPIST

## 2017-12-27 PROCEDURE — 97035 APP MDLTY 1+ULTRASOUND EA 15: CPT | Performed by: PHYSICAL THERAPIST

## 2017-12-27 PROCEDURE — G0283 ELEC STIM OTHER THAN WOUND: HCPCS | Performed by: PHYSICAL THERAPIST

## 2017-12-27 NOTE — PROGRESS NOTES
"    Outpatient Physical Therapy Ortho Treatment Note   [place]     Patient Name: Jaquan Mckay  : 1970  MRN: [id number]  Today's Date: 2017      Visit Date: 2017    Visit Dx:    ICD-10-CM ICD-9-CM   1. Chondromalacia of left knee M94.262 717.7       Patient Active Problem List   Diagnosis   • GERD (gastroesophageal reflux disease)   • Arthritis   • Hypertension   • Seizure disorder   • Hyperlipidemia   • Anxiety   • Varicocele   • Varicocele present on ultrasound of scrotum   • Obesity (BMI 30.0-34.9)   • Chronic bilateral low back pain with left-sided sciatica   • Seasonal allergic rhinitis due to pollen   • Mild intermittent asthma with acute exacerbation   • Precordial pain   • Dysuria   • Congenital coronary artery anomaly   • BMI 35.0-35.9,adult   • Chondromalacia, knee        Past Medical History:   Diagnosis Date   • Allergic    • Anxiety    • Arthritis    • Asthma    • Body piercing     REPORTS CYLICONE IN EARS   • Clotting disorder     had a knee surgery   • Depression    • DVT (deep venous thrombosis)     RIGHT RIGHT KNEE AFTER SURGERY YEARS AGO IN  OR    • Gastric ulcer    • GERD (gastroesophageal reflux disease)    • H/O migraine    • H/O sleep apnea     REPORTS DIAGNOSED WITH SLEEP APNEA AND COULDN'T STAND TO WEAR THE MACHINE   • Headache    • Heart attack     REPORTS \"LIGHT HEART ATTACK A LONG TIME AGO\"  \"EARLY \"   • History of seizures     REPORTS LAST EPISODE WAS AROUND .   • Hostility    • Hyperlipidemia    • Hypertension    • Knee pain, acute     Left   • Low back pain    • Migraine    • MRSA (methicillin resistant Staphylococcus aureus)     REPORTS LAST 2004. WAS TREATED HE REPORTS.  RIGHT ARM, RIGHT KNEE.   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    • Obesity    • Poor historian    • [place] spotted fever    • Tattoo    • Wears glasses         Past Surgical History:   Procedure Laterality Date   • BACK SURGERY     • BRAIN SURGERY      Tumor removal    • " CARDIAC SURGERY      CARDIAC CATH REPORTED AS 2 MONTHS AGO IN [place]. REPORTS NO STENTS PLACED.   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  -AD    Subjective Pain    Able to rate subjective pain? yes  -AD    Pre-Treatment Pain Level 8  -AD    Post-Treatment Pain Level 7  -AD      User Key  (r) = Recorded By, (t) = Taken By, (c) = Cosigned By    Initials Name Provider Type    AD Ashley Claudene Dalton, PT Physical Therapist                            PT Assessment/Plan       12/27/17 1146       PT Assessment    Assessment Comments Pt tolerated today's session fair, with reports of left knee pain during SAQ and SLR. No skin irritation was observed following modalities performed during today's session. The patient continues to have decreased left stance phase and weightbearing during gait today, with decreased left knee extension during stance. He will be progressed as tolerated per POC.  -AD     PT Plan    PT Plan Comments Progress as tolerated per POC.  -AD       User Key  (r) = Recorded By, (t) = Taken By, (c) = Cosigned By    Initials Name Provider Type    AD Ashley Claudene Dalton, PT Physical Therapist                Modalities       12/27/17 1100          Moist Heat    MH Applied Yes  -AD      Location left knee  -AD      Rx Minutes 15 mins  -AD      MH Prior to Rx Yes   w/ estim in supine position, no skin irritation observed  -AD      Ultrasound 28168    Location med/lateral  knee   No skin irritation observed  -AD      Rx Minutes 8 minutes  -AD      Duty Cycle 50  -AD      Frequency --   3.3Mhz  -AD      Intensity - Wts/cm 1.2  -AD      ELECTRICAL STIMULATION    Attended/Unattended Unattended   no skin irritation observed following estim  -AD      Stimulation Type IFC  -AD      Max mAmp --   as to pt's tolerance  -AD      Location/Electrode Placement/Other left knee  -AD      Rx Minutes 15 mins  -AD        User Key  (r) = Recorded By, (t) = Taken By, (c) = Cosigned By    Initials Name Provider Type    AD Ashley Claudene Dalton, PT Physical Therapist                Exercises       12/27/17 1100          Subjective Comments    Subjective Comments Pt reported 8/10 left knee pain prior to today's treatment session.  -AD      Subjective Pain    Able to rate subjective pain? yes  -AD      Pre-Treatment Pain Level 8  -AD      Post-Treatment Pain Level 7  -AD      Exercise 1    Exercise Name 1 QS, SLR, HS, SAQ, knee extension stretch on bolster, RTB knee flexion, RTB hip abduction, LE bicycle.  -AD      Cueing 1 Verbal;Tactile  -AD      Time (Minutes) 1 35 minutes  -AD        User Key  (r) = Recorded By, (t) = Taken By, (c) = Cosigned By    Initials Name Provider Type    AD Ashley Claudene Dalton, PT Physical Therapist                             Therapy Education  Given: HEP, Symptoms/condition management, Pain management, Fall prevention and home safety, Mobility training  Program: Reinforced  How Provided: Verbal, Demonstration  Provided to: Patient  Level of Understanding: Verbalized, Demonstrated              Time Calculation:   Start Time: 1030  Stop Time: 1133  Time Calculation (min): 63 min    Therapy Charges for Today     Code Description Service Date Service Provider Modifiers Qty    18593226970 HC PT ELECTRICAL STIM UNATTENDED 12/27/2017 Ashley Claudene Dalton, PT  1    48520352289 HC PT ULTRASOUND EA 15 MIN 12/27/2017 Ashley Claudene Dalton, GABRIELE GP 1    06533312575 HC PT THER PROC EA  15 MIN 12/27/2017 Ashley Claudene Dalton, PT GP 2                    Ashley Claudene Dalton, PT  12/27/2017

## 2018-01-03 ENCOUNTER — HOSPITAL ENCOUNTER (OUTPATIENT)
Dept: PHYSICAL THERAPY | Facility: HOSPITAL | Age: 48
Setting detail: THERAPIES SERIES
Discharge: HOME | End: 2018-01-03

## 2018-01-03 DIAGNOSIS — M94.262 CHONDROMALACIA OF LEFT KNEE: Primary | ICD-10-CM

## 2018-01-03 PROCEDURE — 97110 THERAPEUTIC EXERCISES: CPT

## 2018-01-03 PROCEDURE — G0283 ELEC STIM OTHER THAN WOUND: HCPCS

## 2018-01-03 NOTE — THERAPY DISCHARGE NOTE
"     Outpatient Physical Therapy Ortho Treatment Note/Discharge Summary   [place]     Patient Name: Jaquan Mckay  : 1970  MRN: [id number]  Today's Date: 1/3/2018      Visit Date: 2018    Visit Dx:    ICD-10-CM ICD-9-CM   1. Chondromalacia of left knee M94.262 717.7       Patient Active Problem List   Diagnosis   • GERD (gastroesophageal reflux disease)   • Arthritis   • Hypertension   • Seizure disorder   • Hyperlipidemia   • Anxiety   • Varicocele   • Varicocele present on ultrasound of scrotum   • Obesity (BMI 30.0-34.9)   • Chronic bilateral low back pain with left-sided sciatica   • Seasonal allergic rhinitis due to pollen   • Mild intermittent asthma with acute exacerbation   • Precordial pain   • Dysuria   • Congenital coronary artery anomaly   • BMI 35.0-35.9,adult   • Chondromalacia, knee        Past Medical History:   Diagnosis Date   • Allergic    • Anxiety    • Arthritis    • Asthma    • Body piercing     REPORTS CYLICONE IN EARS   • Clotting disorder     had a knee surgery   • Depression    • DVT (deep venous thrombosis)     RIGHT RIGHT KNEE AFTER SURGERY YEARS AGO IN  [place]    • Gastric ulcer    • GERD (gastroesophageal reflux disease)    • H/O migraine    • H/O sleep apnea     REPORTS DIAGNOSED WITH SLEEP APNEA AND COULDN'T STAND TO WEAR THE MACHINE   • Headache    • Heart attack     REPORTS \"LIGHT HEART ATTACK A LONG TIME AGO\"  \"EARLY \"   • History of seizures     REPORTS LAST EPISODE WAS AROUND .   • Hostility    • Hyperlipidemia    • Hypertension    • Knee pain, acute     Left   • Low back pain    • Migraine    • MRSA (methicillin resistant Staphylococcus aureus)     REPORTS LAST TESTED 2004. WAS TREATED HE REPORTS.  RIGHT ARM, RIGHT KNEE.   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    • Obesity    • Poor historian    • [place] spotted fever    • Tattoo    • Wears glasses         Past Surgical History:   Procedure Laterality Date   • BACK SURGERY     • BRAIN SURGERY      " Tumor removal    • CARDIAC SURGERY      CARDIAC CATH REPORTED AS 2 MONTHS AGO IN [place]. REPORTS NO STENTS PLACED.   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  Pt states he had to work on his van yesterday, and his knee is sore today.   -ANNAMARIA    Subjective Pain    Able to rate subjective pain? yes  -ANNAMARIA    Pre-Treatment Pain Level 8  -ANNAMARIA    Post-Treatment Pain Level 7  -ANNAMARIA    Myotomal Screen- Lower Quarter Clearing    Hip flexion (L2) Left:;5 (Normal)   strength assessed by Radha Tijerina PT DPT  -ANNAMARIA    Knee extension (L3) Left:;5 (Normal)  -ANNAMARIA    Ankle DF (L4) Left:;5 (Normal)  -ANNAMARIA    Knee flexion (S2) Left:;5 (Normal)  -ANNAMARIA      User Key  (r) = Recorded By, (t) = Taken By, (c) = Cosigned By    Initials Name Provider Type    ANNAMARIA Lancaster PTA Physical Therapy Assistant                            PT Assessment/Plan       01/03/18 1123       PT Assessment    Assessment Comments Patient tolerated treatment well today w/ reports of decreased L) knee pain at conclusion of session, 7/10.  Pt displayed improved L) knee ROM and L) LE strength, however pt continues to report increased pain.  Supervising PT, Radha Tijerina, PT, DPT discussed discharge with patient secondary to pt acheiving strength and ROM goals.  Pt was agreeable to discharge, and continuation of home program.  Pt is scheduled to f/u with referring ortho.  Patient acheived 2/3 STG and 0/4 LTG while in therapy.  Pt displayed  good mechanics w/ therex.  No adverse reactions observed following modalities.   -ANNAMARIA     PT Plan    PT Plan Comments Patient discharged at this time secondary to maximim level acheived, and pt agreeable to discharge.   -ANNAMARIA       User Key  (r) = Recorded By, (t) = Taken By, (c) = Cosigned By    Initials Name Provider Type    ANNAMARIA Lancaster PTA Physical Therapy Assistant                Modalities       01/03/18 1000          Moist Heat    MH Applied Yes  -ANNAMARIA      Location left knee  -ANNAMARIA      Rx Minutes 10 mins  -ANNAMARIA      MH Prior to Rx Yes   w/ estim in supine position  -ANNAMARIA      ELECTRICAL STIMULATION    Attended/Unattended Unattended   no skin irritation observed following estim  -ANNAMARIA      Stimulation Type IFC  -ANNAMARIA      Max mAmp --   as to pt's tolerance  -ANNAMARIA      Location/Electrode Placement/Other left knee  -ANNAMARIA      Rx Minutes 10 mins  -ANNAMARIA        User Key  (r) = Recorded By, (t) = Taken By, (c) = Cosigned By    Initials Name Provider Type    ANNAMARIA Lancaster PTA Physical Therapy Assistant                Exercises       01/03/18 1000          Subjective Comments    Subjective Comments Patient arrives to therapy w/ reports of 8/10 L) knee pain.  Pt states he had to work on his van yesterday, and his knee is sore today.   -ANNAMARIA      Subjective Pain    Able to rate subjective pain? yes  -ANNAMARIA      Pre-Treatment Pain Level 8  -ANNAMARIA      Post-Treatment Pain Level 7  -ANNAMARIA      Exercise 1    Exercise Name 1 QS 10x3, SLR 15x2, heelsides 15x2, SAQ 15x2, ball squeeze 15x2, static knee ext on bolster x 2min, knee flex w/ tband (red) 15x2, hip abd w/ tband (red) 15x2, LE bike LV 2.5 x 7 min, seated march 15x2, LAQ 15x2, seated march 15x2  -ANNAMARIA      Cueing 1 Verbal;Tactile;Demo  -ANNAMARIA      Time (Minutes) 1 40 min  -ANNAMARIA        User Key  (r) = Recorded By, (t) = Taken By, (c) = Cosigned By    Initials Name Provider Type    ANNAMARIA Lancaster PTA Physical Therapy Assistant                               PT OP Goals        01/03/18 1100       PT Short Term Goals    STG Date to Achieve 01/03/18  -ANNAMARIA     STG 1 Patient will be independent/compliant with HEP.  -ANNAMARIA     STG 1 Progress Met  -ANNAMARIA     STG 2 Left Knee ROM will improve to at least 5-90 degrees to allow for greater ease with ADLs.  -ANNAMARIA     STG 2 Progress Met  -ANNAMARIA     STG 2 Progress Comments -3-119  -ANNAMARIA     STG 3 Patient will report pain no greater than 7/10 when performing self-care activities.  -ANNAMARIA     STG 3 Progress Not Met  -ANNAMARIA     STG 3 Progress Comments Patient continues to report increased pain with ADL's and stair climbing  -ANNAMARIA     Long Term Goals    LTG Date to Achieve 01/03/18  -ANNAMARIA     LTG 1 Patient will report pain no greater than 5/10 when ambulating with improved weightbearing fro 20 minutes to shop for groceries.  -ANNAMARIA     LTG 1 Progress Not Met  -ANNAMARIA     LTG 2 Left Knee ROM will improve to at least 0-115 to allow for improved gait pattern.  -ANNAMARIA     LTG 2 Progress Met  -ANNAMARIA     LTG 3 LE strength will improve to at least 4/5 to prevent injury.  -ANNAMARIA     LTG 3 Progress Met  -ANNAMARIA     LTG 4 LEFS will improve by at least 10% to show improved functional mobility.  -ANNAMARIA     LTG 4 Progress Not Met  -ANNAMARIA     LTG 4 Progress Comments LEFS score:  44/80  -ANNAMARIA       User Key  (r) = Recorded By, (t) = Taken By, (c) = Cosigned By    Initials Name Provider Type    ANNAMARIA Lancaster PTA Physical Therapy Assistant          Therapy Education  Given: HEP, Symptoms/condition management, Pain management, Fall prevention and home safety, Edema management, Mobility training  Program: Reinforced  How Provided: Demonstration, Verbal  Provided to: Patient  Level of Understanding: Verbalized, Demonstrated    Outcome Measure Options: Lower Extremity Functional Scale (LEFS)  Lower Extremity Functional Index  Any of your usual work, housework or school activities: A little bit of difficulty  Your usual hobbies, recreational or sporting activities: Quite a bit of difficulty  Getting into or out of  the bath: A little bit of difficulty  Walking between rooms: No difficulty  Putting on your shoes or socks: No difficulty  Squatting: Quite a bit of difficulty  Lifting an object, like a bag of groceries from the floor: No difficulty  Performing light activities around your home: No difficulty  Performing heavy activities around your home: A little bit of difficulty  Getting into or out of a car: A little bit of difficulty  Walking 2 blocks: Moderate difficulty  Walking a mile: Moderate difficulty  Going up or down 10 stairs (about 1 flight of stairs): Quite a bit of difficulty  Standing for 1 hour: Moderate difficulty  Sitting for 1 hour: Moderate difficulty  Running on even ground: Quite a bit of difficulty  Running on uneven ground: Quite a bit of difficulty  Making sharp turns while running fast: Quite a bit of difficulty  Hopping: Quite a bit of difficulty  Rolling over in bed: Quite a bit of difficulty  Total: 44      Time Calculation:   Start Time: 0935  Stop Time: 1045  Time Calculation (min): 70 min    Therapy Charges for Today     Code Description Service Date Service Provider Modifiers Qty    26022282637 HC PT THER PROC EA 15 MIN 1/3/2018 Leighann Lancaster PTA GP 3    82393128524 HC PT ELECTRICAL STIM UNATTENDED 1/3/2018 Leighann Lancaster PTA  1          PT G-Codes  Outcome Measure Options: Lower Extremity Functional Scale (LEFS)     OP PT Discharge Summary  Date of Discharge: 01/03/18  Reason for Discharge: Maximum functional potential achieved, Patient/Caregiver request, other (comment) (pt agreeable to discharge; pt independent w/ home program)  Outcomes Achieved: Patient able to partially acheive established goals  Discharge Destination: Home with home program, Other (comment) (Pt is scheduled to f/u with referring ortho; pt unsure of date)      Leighann Lancaster PTA  1/3/2018

## 2018-01-03 NOTE — THERAPY TREATMENT NOTE
"    Outpatient Physical Therapy Ortho Treatment Note   [place]     Patient Name: Jaquan Mckay  : 1970  MRN: [id number]  Today's Date: 1/3/2018      Visit Date: 2018    Visit Dx:    ICD-10-CM ICD-9-CM   1. Chondromalacia of left knee M94.262 717.7       Patient Active Problem List   Diagnosis   • GERD (gastroesophageal reflux disease)   • Arthritis   • Hypertension   • Seizure disorder   • Hyperlipidemia   • Anxiety   • Varicocele   • Varicocele present on ultrasound of scrotum   • Obesity (BMI 30.0-34.9)   • Chronic bilateral low back pain with left-sided sciatica   • Seasonal allergic rhinitis due to pollen   • Mild intermittent asthma with acute exacerbation   • Precordial pain   • Dysuria   • Congenital coronary artery anomaly   • BMI 35.0-35.9,adult   • Chondromalacia, knee        Past Medical History:   Diagnosis Date   • Allergic    • Anxiety    • Arthritis    • Asthma    • Body piercing     REPORTS CYLICONE IN EARS   • Clotting disorder     had a knee surgery   • Depression    • DVT (deep venous thrombosis)     RIGHT RIGHT KNEE AFTER SURGERY YEARS AGO IN  OR    • Gastric ulcer    • GERD (gastroesophageal reflux disease)    • H/O migraine    • H/O sleep apnea     REPORTS DIAGNOSED WITH SLEEP APNEA AND COULDN'T STAND TO WEAR THE MACHINE   • Headache    • Heart attack     REPORTS \"LIGHT HEART ATTACK A LONG TIME AGO\"  \"EARLY \"   • History of seizures     REPORTS LAST EPISODE WAS AROUND .   • Hostility    • Hyperlipidemia    • Hypertension    • Knee pain, acute     Left   • Low back pain    • Migraine    • MRSA (methicillin resistant Staphylococcus aureus)     REPORTS LAST 2004. WAS TREATED HE REPORTS.  RIGHT ARM, RIGHT KNEE.   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    • Obesity    • Poor historian    • Carl Mountain spotted fever    • Tattoo    • Wears glasses         Past Surgical History:   Procedure Laterality Date   • BACK SURGERY     • BRAIN SURGERY      Tumor removal    • " CARDIAC SURGERY      CARDIAC CATH REPORTED AS 2 MONTHS AGO IN [place]. REPORTS NO STENTS PLACED.   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  Pt states he had to work on his van yesterday, and his knee is sore today.   -ANNAMARIA    Subjective Pain    Able to rate subjective pain? yes  -ANNAMARIA    Pre-Treatment Pain Level 8  -ANNAMARIA    Post-Treatment Pain Level 7  -ANNAMARIA    Myotomal Screen- Lower Quarter Clearing    Hip flexion (L2) Left:;5 (Normal)   strength assessed by Radha Tijerina PT DPT  -ANNAMARIA    Knee extension (L3) Left:;5 (Normal)  -ANNAMARIA    Ankle DF (L4) Left:;5 (Normal)  -ANNAMARIA    Knee flexion (S2) Left:;5 (Normal)  -ANNAMARIA      User Key  (r) = Recorded By, (t) = Taken By, (c) = Cosigned By    Initials Name Provider Type    ANNAMARIA Lancaster PTA Physical Therapy Assistant                            PT Assessment/Plan       01/03/18 1123       PT Assessment    Assessment Comments Patient tolerated treatment well today w/ reports of decreased L) knee pain at conclusion of session, 7/10.  Pt displayed improved L) knee ROM and L) LEstrength, however pt continues to report increased pain.  Supervising PT, Radha Tijerina PT, DPT discussed discharge with patient secondary to pt acheiving strength and ROM goals.  Pt was agreeable to discharge, and continuation of home program.  Pt is scheduled to f/u with referring ortho.  Patient acheived 2/3 STG and 0/4 LTG while in therapy.  Pt displayed good mechanics w/  therex.  No adverse reactions observed following modalities.   -ANNAMARIA     PT Plan    PT Plan Comments Patient discharged at this time secondary to maximim level achieved, and pt agreeable to discharge.   -ANNAMARIA       User Key  (r) = Recorded By, (t) = Taken By, (c) = Cosigned By    Initials Name Provider Type    ANNAMARIA Lancaster PTA Physical Therapy Assistant                Modalities       01/03/18 1000          Moist Heat    MH Applied Yes  -ANNAMARIA      Location left knee  -ANNAMARIA      Rx Minutes 10 mins  -ANNAMARIA      MH Prior to Rx Yes   w/ estim in supine position  -ANNAMARIA      ELECTRICAL STIMULATION    Attended/Unattended Unattended   no skin irritation observed following estim  -ANNAMARIA      Stimulation Type IFC  -ANNAMARIA      Max mAmp --   as to pt's tolerance  -ANNAMARIA      Location/Electrode Placement/Other left knee  -ANNAMARIA      Rx Minutes 10 mins  -ANNAMARIA        User Key  (r) = Recorded By, (t) = Taken By, (c) = Cosigned By    Initials Name Provider Type    ANNAMARIA Lancaster PTA Physical Therapy Assistant                Exercises       01/03/18 1000          Subjective Comments    Subjective Comments Patient arrives to therapy w/ reports of 8/10 L) knee pain.  Pt states he had to work on his van yesterday, and his knee is sore today.   -ANNAMARIA      Subjective Pain    Able to rate subjective pain? yes  -ANNAMARIA      Pre-Treatment Pain Level 8  -ANNAMARIA      Post-Treatment Pain Level 7  -ANNAMARIA      Exercise 1    Exercise Name 1 QS 10x3, SLR 15x2, heelsides 15x2, SAQ 15x2, ball squeeze 15x2, static knee ext on bolster x 2min, knee flex w/ tband (red) 15x2, hip abd w/ tband (red) 15x2, LE bike LV 2.5 x 7 min, seated march 15x2, LAQ 15x2, seated march 15x2  -ANNAMARIA      Cueing 1 Verbal;Tactile;Demo  -ANNAMARIA      Time (Minutes) 1 40 min  -ANNAMARIA        User Key  (r) = Recorded By, (t) = Taken By, (c) = Cosigned By    Initials Name Provider Type    ANNAMARIA Lancaster PTA Physical Therapy Assistant                               PT OP Goals       01/03/18 1100        PT Short Term Goals    STG Date to Achieve 01/03/18  -ANNAMARIA     STG 1 Patient will be independent/compliant with HEP.  -ANNAMARIA     STG 1 Progress Met  -ANNAMARIA     STG 2 Left Knee ROM will improve to at least 5-90 degrees to allow for greater ease with ADLs.  -ANNAMARIA     STG 2 Progress Met  -ANNAMARIA     STG 2 Progress Comments -3-119  -ANNAMARIA     STG 3 Patient will report pain no greater than 7/10 when performing self-care activities.  -ANNAMARIA     STG 3 Progress Not Met  -ANNAMARIA     STG 3 Progress Comments Patient continues to report increased pain with ADL's and stair climbing  -ANNAMARIA     Long Term Goals    LTG Date to Achieve 01/03/18  -ANNAMARIA     LTG 1 Patient will report pain no greater than 5/10 when ambulating with improved weightbearing fro 20 minutes to shop for groceries.  -ANNAMARIA     LTG 1 Progress Not Met  -ANNAMARIA     LTG 2 Left Knee ROM will improve to at least 0-115 to allow for improved gait pattern.  -ANNAMARIA     LTG 2 Progress Met  -ANNAMARIA     LTG 3 LE strength will improve to at least 4/5 to prevent injury.  -ANNAMARIA     LTG 3 Progress Met  -ANNAMARIA     LTG 4 LEFS will improve by at least 10% to show improved functional mobility.  -ANNAMARIA     LTG 4 Progress Not Met  -ANNAMARIA     LTG 4 Progress Comments LEFS score:  44/80  -ANNAMARIA       User Key  (r) = Recorded By, (t) = Taken By, (c) = Cosigned By    Initials Name Provider Type    ANNAAMRIA Lancaster, PTA Physical Therapy Assistant          Therapy Education  Given: HEP, Symptoms/condition management, Pain management, Fall prevention and home safety, Edema management, Mobility training  Program: Reinforced  How Provided: Demonstration, Verbal  Provided to: Patient  Level of Understanding: Verbalized, Demonstrated    Outcome Measure Options: Lower Extremity Functional Scale (LEFS)  Lower Extremity Functional Index  Any of your usual work, housework or school activities: A little bit of difficulty  Your usual hobbies, recreational or sporting activities: Quite a bit of difficulty  Getting into or out of the bath: A  little bit of difficulty  Walking between rooms: No difficulty  Putting on your shoes or socks: No difficulty  Squatting: Quite a bit of difficulty  Lifting an object, like a bag of groceries from the floor: No difficulty  Performing light activities around your home: No difficulty  Performing heavy activities around your home: A little bit of difficulty  Getting into or out of a car: A little bit of difficulty  Walking 2 blocks: Moderate difficulty  Walking a mile: Moderate difficulty  Going up or down 10 stairs (about 1 flight of stairs): Quite a bit of difficulty  Standing for 1 hour: Moderate difficulty  Sitting for 1 hour: Moderate difficulty  Running on even ground: Quite a bit of difficulty  Running on uneven ground: Quite a bit of difficulty  Making sharp turns while running fast: Quite a bit of difficulty  Hopping: Quite a bit of difficulty  Rolling over in bed: Quite a bit of difficulty  Total: 44      Time Calculation:   Start Time: 0935  Stop Time: 1045  Time Calculation (min): 70 min    Therapy Charges for Today     Code Description Service Date Service Provider Modifiers Qty    19991624403 HC PT THER PROC EA 15 MIN 1/3/2018 Leighann Lancaster PTA GP 3    [id number] HC PT ELECTRICAL STIM UNATTENDED 1/3/2018 Leighann Lancaster PTA  1          PT G-Codes  Outcome Measure Options: Lower Extremity Functional Scale (LEFS)         Leighann Lancaster PTA  1/3/2018

## 2018-01-08 ENCOUNTER — OFFICE VISIT (OUTPATIENT)
Dept: FAMILY MEDICINE CLINIC | Facility: CLINIC | Age: 48
End: 2018-01-08

## 2018-01-08 VITALS
SYSTOLIC BLOOD PRESSURE: 120 MMHG | OXYGEN SATURATION: 98 % | HEART RATE: 66 BPM | HEIGHT: 67 IN | WEIGHT: 231 LBS | TEMPERATURE: 98.3 F | DIASTOLIC BLOOD PRESSURE: 76 MMHG | BODY MASS INDEX: 36.26 KG/M2

## 2018-01-08 DIAGNOSIS — E78.00 PURE HYPERCHOLESTEROLEMIA: ICD-10-CM

## 2018-01-08 DIAGNOSIS — G89.29 CHRONIC BILATERAL LOW BACK PAIN WITH LEFT-SIDED SCIATICA: ICD-10-CM

## 2018-01-08 DIAGNOSIS — G40.909 SEIZURE DISORDER (HCC): ICD-10-CM

## 2018-01-08 DIAGNOSIS — M76.61 ACHILLES TENDINITIS OF BOTH LOWER EXTREMITIES: Primary | ICD-10-CM

## 2018-01-08 DIAGNOSIS — M54.42 CHRONIC BILATERAL LOW BACK PAIN WITH LEFT-SIDED SCIATICA: ICD-10-CM

## 2018-01-08 DIAGNOSIS — E53.8 LOW VITAMIN B12 LEVEL: ICD-10-CM

## 2018-01-08 DIAGNOSIS — R07.2 PRECORDIAL PAIN: ICD-10-CM

## 2018-01-08 DIAGNOSIS — E78.2 MIXED HYPERLIPIDEMIA: ICD-10-CM

## 2018-01-08 DIAGNOSIS — M76.62 ACHILLES TENDINITIS OF BOTH LOWER EXTREMITIES: Primary | ICD-10-CM

## 2018-01-08 DIAGNOSIS — M62.838 MUSCLE SPASM OF BOTH LOWER LEGS: ICD-10-CM

## 2018-01-08 DIAGNOSIS — I10 ESSENTIAL HYPERTENSION: ICD-10-CM

## 2018-01-08 DIAGNOSIS — R79.89 LOW VITAMIN D LEVEL: ICD-10-CM

## 2018-01-08 PROCEDURE — 99214 OFFICE O/P EST MOD 30 MIN: CPT | Performed by: NURSE PRACTITIONER

## 2018-01-08 RX ORDER — CYCLOBENZAPRINE HCL 10 MG
10 TABLET ORAL 2 TIMES DAILY PRN
Qty: 60 TABLET | Refills: 5 | Status: SHIPPED | OUTPATIENT
Start: 2018-01-08 | End: 2018-07-31 | Stop reason: SDUPTHER

## 2018-01-08 RX ORDER — PHENTERMINE HYDROCHLORIDE 37.5 MG/1
37.5 TABLET ORAL
Qty: 30 TABLET | Refills: 0 | Status: SHIPPED | OUTPATIENT
Start: 2018-01-08 | End: 2018-03-07 | Stop reason: SDUPTHER

## 2018-01-08 RX ORDER — HYDROCODONE BITARTRATE AND ACETAMINOPHEN 7.5; 325 MG/1; MG/1
1 TABLET ORAL 2 TIMES DAILY PRN
Qty: 60 TABLET | Refills: 0 | Status: SHIPPED | OUTPATIENT
Start: 2018-01-08 | End: 2018-02-07 | Stop reason: SDUPTHER

## 2018-01-08 NOTE — PROGRESS NOTES
"Subjective   Jaquan Mckay is a 47 y.o. male.     Chief Complaint   Patient presents with   • Ankle Pain     wants to go to foot dr   • Knee Pain     since knee surgery       History of Present Illness     Foot pain-near achilles region.  He reports \"sore\".  Aching when not in use.  Walking does not seem to exacerbate the discomfort.  No obvious edema or swelling.  Does report past ankle injuries.  Not at goal.   Left Knee pain-has been released by PT.  He reports he is not able to push himself or over exert.  If he does it seems to make his knee pain worse.  He does have a follow up with orthopedic surgeon.  He reports the left knee did not seem to do as well as when he had right knee repair several years ago.  He does report intermittent edema.  He does wear a support at times.  He does report he is having some calf spasm.  Ongoing.    Obesity-noted weight gain this month after the holidays.  He is taking Adipex as ordered and trying to exercise.  He is watching his diet as he is able.  He reports he is trying to cut out soda and has not drank in in 8 days.  Ongoing.      The following portions of the patient's history were reviewed and updated as appropriate: allergies, current medications, past family history, past medical history, past social history, past surgical history and problem list.    Review of Systems   Constitutional: Positive for fatigue. Negative for appetite change and fever.   HENT: Positive for sore throat (mostly in the AM). Negative for congestion, ear pain, postnasal drip, rhinorrhea, sinus pressure and tinnitus.    Eyes: Negative for pain and visual disturbance (wears glasses.  recent eye exam with new glasses).   Respiratory: Positive for cough and shortness of breath. Negative for chest tightness and wheezing.    Cardiovascular: Negative for chest pain and palpitations.   Gastrointestinal: Negative for abdominal pain, constipation, diarrhea and vomiting.   Genitourinary: Positive for " "dysuria (chronic) and testicular pain (chronic). Negative for hematuria and urgency.   Musculoskeletal: Positive for arthralgias, back pain (with radiation to BLE \"down to ankles\" ) and myalgias.   Neurological: Positive for weakness (BLE), numbness (and tingling with \"electricity\" sensation in his legs) and headaches. Negative for dizziness.   Psychiatric/Behavioral: Positive for sleep disturbance. Negative for dysphoric mood and suicidal ideas. The patient is not nervous/anxious.    All other systems reviewed and are negative.      Objective     /76 (BP Location: Right arm, Patient Position: Sitting, Cuff Size: Adult)  Pulse 66  Temp 98.3 °F (36.8 °C) (Tympanic)   Ht 170.2 cm (67.01\")  Wt 105 kg (231 lb)  SpO2 98%  BMI 36.17 kg/m2    Physical Exam   Constitutional: He is oriented to person, place, and time. He appears well-developed and well-nourished.   HENT:   Head: Normocephalic and atraumatic.   Right Ear: External ear and ear canal normal. Tympanic membrane is not erythematous.   Left Ear: External ear and ear canal normal. Tympanic membrane is not erythematous.   Nose: Mucosal edema and rhinorrhea present.   Mouth/Throat: No oropharyngeal exudate or posterior oropharyngeal erythema.   Eyes: Conjunctivae and EOM are normal. Pupils are equal, round, and reactive to light. No scleral icterus.   Neck: Normal range of motion. Neck supple. No JVD present. No thyromegaly present.   Cardiovascular: Normal rate, regular rhythm and normal heart sounds.    No murmur heard.  Pulmonary/Chest: Effort normal. No respiratory distress. He has no decreased breath sounds. He has no wheezes. He exhibits no tenderness.   Abdominal: Soft. Bowel sounds are normal. He exhibits no mass. There is no tenderness.   Musculoskeletal: He exhibits no edema.        Right elbow: Tenderness found. Medial epicondyle and olecranon process tenderness noted.        Left knee: He exhibits decreased range of motion and swelling (mild). " He exhibits no ecchymosis. Tenderness found. Medial joint line and lateral joint line tenderness noted.        Lumbar back: He exhibits decreased range of motion, tenderness, pain and spasm. He exhibits no swelling.        Right hand: He exhibits tenderness (3rd digit). He exhibits normal capillary refill.   Gait shuffling with limping noted.  More prominent on right side from behind      Skin Integrity  -  His right foot skin is intact.     Jaquan 's left foot skin is intact. .  Lymphadenopathy:        Head (right side): No submandibular adenopathy present.        Head (left side): No submandibular adenopathy present.     He has no cervical adenopathy.   Neurological: He is alert and oriented to person, place, and time. He has normal reflexes. No cranial nerve deficit. He exhibits normal muscle tone. Coordination normal.   Skin: Skin is warm and dry.   Psychiatric: His speech is normal and behavior is normal. Judgment and thought content normal. His mood appears not anxious. He is not actively hallucinating. Cognition and memory are normal. He exhibits a depressed mood. He expresses no homicidal and no suicidal ideation. He exhibits normal recent memory and normal remote memory. He is attentive.   Vitals reviewed.    Assessment/Plan     Problem List Items Addressed This Visit        Cardiovascular and Mediastinum    Hypertension    Relevant Orders    CBC & Differential    Comprehensive Metabolic Panel    Hyperlipidemia    Relevant Orders    Lipid Panel       Nervous and Auditory    Seizure disorder    Relevant Orders    Phenytoin level, free    Phenytoin level, total    Chronic bilateral low back pain with left-sided sciatica    Relevant Medications    HYDROcodone-acetaminophen (NORCO) 7.5-325 MG per tablet    Precordial pain    Relevant Orders    TSH    T4, Free       Musculoskeletal and Integument    Achilles tendinitis of both lower extremities - Primary    Current Assessment & Plan     Probable.  Will refer to  podiatry per his request.          Relevant Orders    Ambulatory Referral to Podiatry (Completed)    Muscle spasm of both lower legs    Relevant Medications    cyclobenzaprine (FLEXERIL) 10 MG tablet       Other    BMI 35.0-35.9,adult    Relevant Medications    phentermine (ADIPEX-P) 37.5 MG tablet    Other Relevant Orders    Hemoglobin A1c    Vitamin D 25 Hydroxy    Vitamin B12        Reports he may check to see if his Adipex is cheaper at [facility].    Change in muscle relaxant due to reports of ineffective.  CHAVEZ reviewed today and consistent.  Will refill prescribed controlled medication today.  Patient is aware they cannot receive narcotics from any other provider.  Risk and benefits of medication use has been reviewed.  History and physical exam exhibit continued safe and appropriate use of controlled substances.  Understands disease processes and need for medications.  Understands reasons for urgent and emergent care.  Patient (& family) verbalized agreement for treatment plan.   Advised to resched his appt with Dr Daily due to recent ED visit for CP.  RTC 1 month, sooner if needed.           This document has been electronically signed by:  BALDOMERO Thao, YESY-C

## 2018-01-10 ENCOUNTER — LAB (OUTPATIENT)
Dept: LAB | Facility: HOSPITAL | Age: 48
End: 2018-01-10

## 2018-01-10 DIAGNOSIS — G40.909 SEIZURE DISORDER (HCC): ICD-10-CM

## 2018-01-10 DIAGNOSIS — G40.802 OTHER EPILEPSY WITHOUT STATUS EPILEPTICUS, NOT INTRACTABLE (HCC): ICD-10-CM

## 2018-01-10 LAB
25(OH)D3 SERPL-MCNC: 24 NG/ML
ALBUMIN SERPL-MCNC: 4.1 G/DL (ref 3.5–5)
ALBUMIN/GLOB SERPL: 1.2 G/DL (ref 1.5–2.5)
ALP SERPL-CCNC: 80 U/L (ref 40–129)
ALT SERPL W P-5'-P-CCNC: 29 U/L (ref 10–44)
ANION GAP SERPL CALCULATED.3IONS-SCNC: 3.3 MMOL/L (ref 3.6–11.2)
AST SERPL-CCNC: 27 U/L (ref 10–34)
BASOPHILS # BLD AUTO: 0.01 10*3/MM3 (ref 0–0.3)
BASOPHILS NFR BLD AUTO: 0.2 % (ref 0–2)
BILIRUB SERPL-MCNC: 0.5 MG/DL (ref 0.2–1.8)
BUN BLD-MCNC: 15 MG/DL (ref 7–21)
BUN/CREAT SERPL: 16.7 (ref 7–25)
CALCIUM SPEC-SCNC: 9 MG/DL (ref 7.7–10)
CHLORIDE SERPL-SCNC: 103 MMOL/L (ref 99–112)
CHOLEST SERPL-MCNC: 216 MG/DL (ref 0–200)
CO2 SERPL-SCNC: 30.7 MMOL/L (ref 24.3–31.9)
CREAT BLD-MCNC: 0.9 MG/DL (ref 0.43–1.29)
DEPRECATED RDW RBC AUTO: 43.3 FL (ref 37–54)
EOSINOPHIL # BLD AUTO: 0.08 10*3/MM3 (ref 0–0.7)
EOSINOPHIL NFR BLD AUTO: 1.5 % (ref 0–5)
ERYTHROCYTE [DISTWIDTH] IN BLOOD BY AUTOMATED COUNT: 12.9 % (ref 11.5–14.5)
GFR SERPL CREATININE-BSD FRML MDRD: 91 ML/MIN/1.73
GLOBULIN UR ELPH-MCNC: 3.4 GM/DL
GLUCOSE BLD-MCNC: 98 MG/DL (ref 70–110)
HBA1C MFR BLD: 5 % (ref 4.5–5.7)
HCT VFR BLD AUTO: 43.1 % (ref 42–52)
HDLC SERPL-MCNC: 73 MG/DL (ref 60–100)
HGB BLD-MCNC: 15.1 G/DL (ref 14–18)
IMM GRANULOCYTES # BLD: 0.01 10*3/MM3 (ref 0–0.03)
IMM GRANULOCYTES NFR BLD: 0.2 % (ref 0–0.5)
LDLC SERPL CALC-MCNC: 131 MG/DL (ref 0–100)
LDLC/HDLC SERPL: 1.8 {RATIO}
LYMPHOCYTES # BLD AUTO: 1.39 10*3/MM3 (ref 1–3)
LYMPHOCYTES NFR BLD AUTO: 25.8 % (ref 21–51)
MCH RBC QN AUTO: 33.6 PG (ref 27–33)
MCHC RBC AUTO-ENTMCNC: 35 G/DL (ref 33–37)
MCV RBC AUTO: 95.8 FL (ref 80–94)
MONOCYTES # BLD AUTO: 0.99 10*3/MM3 (ref 0.1–0.9)
MONOCYTES NFR BLD AUTO: 18.4 % (ref 0–10)
NEUTROPHILS # BLD AUTO: 2.91 10*3/MM3 (ref 1.4–6.5)
NEUTROPHILS NFR BLD AUTO: 53.9 % (ref 30–70)
OSMOLALITY SERPL CALC.SUM OF ELEC: 274.6 MOSM/KG (ref 273–305)
PHENYTOIN SERPL-MCNC: 14.6 MCG/ML (ref 10–20)
PLATELET # BLD AUTO: 159 10*3/MM3 (ref 130–400)
PMV BLD AUTO: 10.2 FL (ref 6–10)
POTASSIUM BLD-SCNC: 4.1 MMOL/L (ref 3.5–5.3)
PROT SERPL-MCNC: 7.5 G/DL (ref 6–8)
RBC # BLD AUTO: 4.5 10*6/MM3 (ref 4.7–6.1)
SODIUM BLD-SCNC: 137 MMOL/L (ref 135–153)
T4 FREE SERPL-MCNC: 1 NG/DL (ref 0.89–1.76)
TRIGL SERPL-MCNC: 59 MG/DL (ref 0–150)
TSH SERPL DL<=0.05 MIU/L-ACNC: 2.28 MIU/ML (ref 0.55–4.78)
VIT B12 BLD-MCNC: 453 PG/ML (ref 211–911)
VLDLC SERPL-MCNC: 11.8 MG/DL
WBC NRBC COR # BLD: 5.39 10*3/MM3 (ref 4.5–12.5)

## 2018-01-10 PROCEDURE — 83036 HEMOGLOBIN GLYCOSYLATED A1C: CPT | Performed by: NURSE PRACTITIONER

## 2018-01-10 PROCEDURE — 82306 VITAMIN D 25 HYDROXY: CPT | Performed by: NURSE PRACTITIONER

## 2018-01-10 PROCEDURE — 80061 LIPID PANEL: CPT | Performed by: NURSE PRACTITIONER

## 2018-01-10 PROCEDURE — 84439 ASSAY OF FREE THYROXINE: CPT | Performed by: NURSE PRACTITIONER

## 2018-01-10 PROCEDURE — 82607 VITAMIN B-12: CPT | Performed by: NURSE PRACTITIONER

## 2018-01-10 PROCEDURE — 80050 GENERAL HEALTH PANEL: CPT | Performed by: NURSE PRACTITIONER

## 2018-01-10 PROCEDURE — 80186 ASSAY OF PHENYTOIN FREE: CPT

## 2018-01-10 PROCEDURE — 80185 ASSAY OF PHENYTOIN TOTAL: CPT

## 2018-01-10 PROCEDURE — 36415 COLL VENOUS BLD VENIPUNCTURE: CPT | Performed by: NURSE PRACTITIONER

## 2018-01-10 RX ORDER — PHENYTOIN SODIUM 100 MG/1
CAPSULE, EXTENDED RELEASE ORAL
Qty: 150 CAPSULE | Refills: 5 | Status: SHIPPED | OUTPATIENT
Start: 2018-01-10 | End: 2018-09-05 | Stop reason: SDUPTHER

## 2018-01-12 LAB — PHENYTOIN FREE SERPL-MCNC: 0.9 UG/ML (ref 1–2)

## 2018-02-05 ENCOUNTER — HOSPITAL ENCOUNTER (EMERGENCY)
Facility: HOSPITAL | Age: 48
Discharge: HOME | End: 2018-02-06
Attending: FAMILY MEDICINE | Admitting: EMERGENCY MEDICINE

## 2018-02-05 DIAGNOSIS — F41.8 ANXIETY ABOUT HEALTH: ICD-10-CM

## 2018-02-05 DIAGNOSIS — R07.9 CHEST PAIN IN ADULT: Primary | ICD-10-CM

## 2018-02-05 DIAGNOSIS — E87.6 ACUTE HYPOKALEMIA: ICD-10-CM

## 2018-02-05 PROCEDURE — 93005 ELECTROCARDIOGRAM TRACING: CPT | Performed by: FAMILY MEDICINE

## 2018-02-05 PROCEDURE — 99284 EMERGENCY DEPT VISIT MOD MDM: CPT

## 2018-02-05 PROCEDURE — 36415 COLL VENOUS BLD VENIPUNCTURE: CPT

## 2018-02-05 PROCEDURE — 93010 ELECTROCARDIOGRAM REPORT: CPT | Performed by: INTERNAL MEDICINE

## 2018-02-05 RX ORDER — LORAZEPAM 2 MG/ML
0.5 INJECTION INTRAMUSCULAR ONCE
Status: COMPLETED | OUTPATIENT
Start: 2018-02-06 | End: 2018-02-06

## 2018-02-05 RX ORDER — SODIUM CHLORIDE 0.9 % (FLUSH) 0.9 %
10 SYRINGE (ML) INJECTION AS NEEDED
Status: DISCONTINUED | OUTPATIENT
Start: 2018-02-05 | End: 2018-02-06 | Stop reason: HOSPADM

## 2018-02-06 ENCOUNTER — APPOINTMENT (OUTPATIENT)
Dept: GENERAL RADIOLOGY | Facility: HOSPITAL | Age: 48
End: 2018-02-06

## 2018-02-06 VITALS
RESPIRATION RATE: 16 BRPM | TEMPERATURE: 98.7 F | WEIGHT: 218 LBS | HEART RATE: 68 BPM | HEIGHT: 67 IN | OXYGEN SATURATION: 98 % | SYSTOLIC BLOOD PRESSURE: 136 MMHG | BODY MASS INDEX: 34.21 KG/M2 | DIASTOLIC BLOOD PRESSURE: 78 MMHG

## 2018-02-06 LAB
ALBUMIN SERPL-MCNC: 4.4 G/DL (ref 3.5–5)
ALBUMIN/GLOB SERPL: 1.3 G/DL (ref 1.5–2.5)
ALP SERPL-CCNC: 88 U/L (ref 40–129)
ALT SERPL W P-5'-P-CCNC: 31 U/L (ref 10–44)
ANION GAP SERPL CALCULATED.3IONS-SCNC: 3.4 MMOL/L (ref 3.6–11.2)
APTT PPP: 26.6 SECONDS (ref 23.8–36.1)
AST SERPL-CCNC: 28 U/L (ref 10–34)
BASOPHILS # BLD AUTO: 0.01 10*3/MM3 (ref 0–0.3)
BASOPHILS NFR BLD AUTO: 0.1 % (ref 0–2)
BILIRUB SERPL-MCNC: 0.4 MG/DL (ref 0.2–1.8)
BNP SERPL-MCNC: <2 PG/ML (ref 0–100)
BUN BLD-MCNC: 13 MG/DL (ref 7–21)
BUN/CREAT SERPL: 12.6 (ref 7–25)
CALCIUM SPEC-SCNC: 9.4 MG/DL (ref 7.7–10)
CHLORIDE SERPL-SCNC: 106 MMOL/L (ref 99–112)
CO2 SERPL-SCNC: 27.6 MMOL/L (ref 24.3–31.9)
CREAT BLD-MCNC: 1.03 MG/DL (ref 0.43–1.29)
CRP SERPL-MCNC: 0.94 MG/DL (ref 0–0.99)
DEPRECATED RDW RBC AUTO: 40.6 FL (ref 37–54)
EOSINOPHIL # BLD AUTO: 0.06 10*3/MM3 (ref 0–0.7)
EOSINOPHIL NFR BLD AUTO: 0.6 % (ref 0–5)
ERYTHROCYTE [DISTWIDTH] IN BLOOD BY AUTOMATED COUNT: 12.5 % (ref 11.5–14.5)
ERYTHROCYTE [SEDIMENTATION RATE] IN BLOOD: 8 MM/HR (ref 0–15)
FLUAV AG NPH QL: NEGATIVE
FLUBV AG NPH QL IA: NEGATIVE
GFR SERPL CREATININE-BSD FRML MDRD: 78 ML/MIN/1.73
GLOBULIN UR ELPH-MCNC: 3.5 GM/DL
GLUCOSE BLD-MCNC: 112 MG/DL (ref 70–110)
HCT VFR BLD AUTO: 44.3 % (ref 42–52)
HGB BLD-MCNC: 15.8 G/DL (ref 14–18)
IMM GRANULOCYTES # BLD: 0.01 10*3/MM3 (ref 0–0.03)
IMM GRANULOCYTES NFR BLD: 0.1 % (ref 0–0.5)
INR PPP: 1.03 (ref 0.9–1.1)
LYMPHOCYTES # BLD AUTO: 2.03 10*3/MM3 (ref 1–3)
LYMPHOCYTES NFR BLD AUTO: 21.2 % (ref 21–51)
MCH RBC QN AUTO: 32.7 PG (ref 27–33)
MCHC RBC AUTO-ENTMCNC: 35.7 G/DL (ref 33–37)
MCV RBC AUTO: 91.7 FL (ref 80–94)
MONOCYTES # BLD AUTO: 0.98 10*3/MM3 (ref 0.1–0.9)
MONOCYTES NFR BLD AUTO: 10.3 % (ref 0–10)
NEUTROPHILS # BLD AUTO: 6.47 10*3/MM3 (ref 1.4–6.5)
NEUTROPHILS NFR BLD AUTO: 67.7 % (ref 30–70)
OSMOLALITY SERPL CALC.SUM OF ELEC: 274.7 MOSM/KG (ref 273–305)
PLATELET # BLD AUTO: 161 10*3/MM3 (ref 130–400)
PMV BLD AUTO: 9.6 FL (ref 6–10)
POTASSIUM BLD-SCNC: 3.3 MMOL/L (ref 3.5–5.3)
PROT SERPL-MCNC: 7.9 G/DL (ref 6–8)
PROTHROMBIN TIME: 13.6 SECONDS (ref 11–15.4)
RBC # BLD AUTO: 4.83 10*6/MM3 (ref 4.7–6.1)
S PYO AG THROAT QL: NEGATIVE
SODIUM BLD-SCNC: 137 MMOL/L (ref 135–153)
TROPONIN I SERPL-MCNC: <0.006 NG/ML
TROPONIN I SERPL-MCNC: <0.006 NG/ML
WBC NRBC COR # BLD: 9.56 10*3/MM3 (ref 4.5–12.5)

## 2018-02-06 PROCEDURE — 96376 TX/PRO/DX INJ SAME DRUG ADON: CPT

## 2018-02-06 PROCEDURE — 71045 X-RAY EXAM CHEST 1 VIEW: CPT | Performed by: RADIOLOGY

## 2018-02-06 PROCEDURE — 96361 HYDRATE IV INFUSION ADD-ON: CPT

## 2018-02-06 PROCEDURE — 85652 RBC SED RATE AUTOMATED: CPT | Performed by: FAMILY MEDICINE

## 2018-02-06 PROCEDURE — 85610 PROTHROMBIN TIME: CPT | Performed by: FAMILY MEDICINE

## 2018-02-06 PROCEDURE — 85730 THROMBOPLASTIN TIME PARTIAL: CPT | Performed by: FAMILY MEDICINE

## 2018-02-06 PROCEDURE — 96374 THER/PROPH/DIAG INJ IV PUSH: CPT

## 2018-02-06 PROCEDURE — 86140 C-REACTIVE PROTEIN: CPT | Performed by: FAMILY MEDICINE

## 2018-02-06 PROCEDURE — 94640 AIRWAY INHALATION TREATMENT: CPT

## 2018-02-06 PROCEDURE — 71045 X-RAY EXAM CHEST 1 VIEW: CPT

## 2018-02-06 PROCEDURE — 94799 UNLISTED PULMONARY SVC/PX: CPT

## 2018-02-06 PROCEDURE — 85025 COMPLETE CBC W/AUTO DIFF WBC: CPT | Performed by: FAMILY MEDICINE

## 2018-02-06 PROCEDURE — 84484 ASSAY OF TROPONIN QUANT: CPT | Performed by: FAMILY MEDICINE

## 2018-02-06 PROCEDURE — 25010000002 LORAZEPAM PER 2 MG: Performed by: FAMILY MEDICINE

## 2018-02-06 PROCEDURE — 87804 INFLUENZA ASSAY W/OPTIC: CPT | Performed by: FAMILY MEDICINE

## 2018-02-06 PROCEDURE — 83880 ASSAY OF NATRIURETIC PEPTIDE: CPT | Performed by: FAMILY MEDICINE

## 2018-02-06 PROCEDURE — 80053 COMPREHEN METABOLIC PANEL: CPT | Performed by: FAMILY MEDICINE

## 2018-02-06 PROCEDURE — 87880 STREP A ASSAY W/OPTIC: CPT | Performed by: FAMILY MEDICINE

## 2018-02-06 PROCEDURE — 87081 CULTURE SCREEN ONLY: CPT | Performed by: FAMILY MEDICINE

## 2018-02-06 RX ORDER — LORAZEPAM 2 MG/ML
0.5 INJECTION INTRAMUSCULAR ONCE
Status: COMPLETED | OUTPATIENT
Start: 2018-02-06 | End: 2018-02-06

## 2018-02-06 RX ORDER — MORPHINE SULFATE 2 MG/ML
2 INJECTION, SOLUTION INTRAMUSCULAR; INTRAVENOUS ONCE
Status: DISCONTINUED | OUTPATIENT
Start: 2018-02-06 | End: 2018-02-06

## 2018-02-06 RX ORDER — OXYCODONE AND ACETAMINOPHEN 10; 325 MG/1; MG/1
1 TABLET ORAL ONCE
Status: COMPLETED | OUTPATIENT
Start: 2018-02-06 | End: 2018-02-06

## 2018-02-06 RX ORDER — IPRATROPIUM BROMIDE AND ALBUTEROL SULFATE 2.5; .5 MG/3ML; MG/3ML
3 SOLUTION RESPIRATORY (INHALATION) ONCE
Status: COMPLETED | OUTPATIENT
Start: 2018-02-06 | End: 2018-02-06

## 2018-02-06 RX ORDER — POTASSIUM CHLORIDE 20 MEQ/1
40 TABLET, EXTENDED RELEASE ORAL ONCE
Status: COMPLETED | OUTPATIENT
Start: 2018-02-06 | End: 2018-02-06

## 2018-02-06 RX ADMIN — POTASSIUM CHLORIDE 40 MEQ: 1500 TABLET, EXTENDED RELEASE ORAL at 01:40

## 2018-02-06 RX ADMIN — IPRATROPIUM BROMIDE AND ALBUTEROL SULFATE 3 ML: .5; 3 SOLUTION RESPIRATORY (INHALATION) at 01:08

## 2018-02-06 RX ADMIN — LORAZEPAM 0.5 MG: 2 INJECTION, SOLUTION INTRAMUSCULAR; INTRAVENOUS at 02:17

## 2018-02-06 RX ADMIN — OXYCODONE HYDROCHLORIDE AND ACETAMINOPHEN 1 TABLET: 10; 325 TABLET ORAL at 01:40

## 2018-02-06 RX ADMIN — LORAZEPAM 0.5 MG: 2 INJECTION, SOLUTION INTRAMUSCULAR; INTRAVENOUS at 00:18

## 2018-02-06 RX ADMIN — SODIUM CHLORIDE 1000 ML: 9 INJECTION, SOLUTION INTRAVENOUS at 00:17

## 2018-02-06 NOTE — ED PROVIDER NOTES
"Subjective   History of Present Illness  48 y/o M w/h/o anxiety p/w acute onset of SOB and CP approx one hour PTP while pt was at [facility]. Pt states that the symptoms have continued and are no better. Pt denies any fevers/chills. CP is substernal and nonradiating. Pt states that he has had an MI many yrs ago but denies any h/o stents.   Review of Systems   Constitutional: Negative for chills, fatigue and fever.   Eyes: Negative for photophobia and visual disturbance.   Respiratory: Positive for cough and shortness of breath. Negative for chest tightness and wheezing.    Cardiovascular: Positive for chest pain. Negative for palpitations.   Gastrointestinal: Negative for abdominal distention and abdominal pain.   Genitourinary: Negative for difficulty urinating and dysuria.   Musculoskeletal: Negative for back pain and neck pain.   Skin: Negative for color change and pallor.   Neurological: Negative for dizziness, seizures, speech difficulty, weakness, light-headedness and numbness.   Hematological: Does not bruise/bleed easily.   All other systems reviewed and are negative.      Past Medical History:   Diagnosis Date   • Allergic    • Anxiety    • Arthritis    • Asthma    • Body piercing     REPORTS CYLICONE IN EARS   • Clotting disorder 2004    had a knee surgery   • Depression    • DVT (deep venous thrombosis)     RIGHT RIGHT KNEE AFTER SURGERY YEARS AGO IN 2001 OR 2004   • Gastric ulcer    • GERD (gastroesophageal reflux disease)    • H/O migraine    • H/O sleep apnea     REPORTS DIAGNOSED WITH SLEEP APNEA AND COULDN'T STAND TO WEAR THE MACHINE   • Headache    • Heart attack     REPORTS \"LIGHT HEART ATTACK A LONG TIME AGO\"  \"EARLY 90'S\"   • History of seizures     REPORTS LAST EPISODE WAS AROUND 1995.   • Hostility    • Hyperlipidemia    • Hypertension    • Knee pain, acute     Left   • Low back pain    • Migraine    • MRSA (methicillin resistant Staphylococcus aureus)     REPORTS LAST TESTED + 2004. WAS TREATED " HE REPORTS.  RIGHT ARM, RIGHT KNEE.   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    • Ciprofloxacin    • Contrast Dye    • Fish-Derived Products Hives   • Mobic [Meloxicam]    • Penicillins    • Prednisone    • Robitussin Cough+ Chest Max St  [Dextromethorphan-Guaifenesin]    • Sulfa Antibiotics    • Zoloft [Sertraline Hcl] Hives and Itching   • Gabapentin Rash   • Keflex [Cephalexin] Rash   • Metoprolol Rash   • Peanut-Containing Drug Products Rash   • Shrimp (Diagnostic) Rash       Past Surgical History:   Procedure Laterality Date   • BACK SURGERY     • BRAIN SURGERY  1986    Tumor removal    • CARDIAC SURGERY      CARDIAC CATH REPORTED AS 2 MONTHS AGO IN [place]. REPORTS NO STENTS PLACED.   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       Family History   Problem Relation Age of Onset   • Diabetes Mother    • Hypertension Mother    • Stroke Mother    • Diabetes Father    • Skin cancer Father    • Hypertension Father    • Heart attack Father    • Diabetes Brother    • Hypertension Brother    • Heart disease Maternal Aunt    • Heart disease Maternal Uncle    • Heart disease Paternal Aunt    • Heart disease Paternal Uncle    • Heart disease Maternal Grandmother    • Heart disease Maternal Grandfather    • Heart disease Paternal Grandmother    • Heart disease Paternal Grandfather        Social History      Social History   • Marital status:      Spouse name: N/A   • Number of children: 2   • Years of education: 12     Occupational History   • DISABLED      Social History Main Topics   • Smoking status: Current Every Day Smoker     Packs/day: 1.00     Years: 7.00     Types: Cigars, Cigarettes     Start date: 5/5/2010   • Smokeless tobacco: Never Used   • Alcohol use No   • Drug use: No   • Sexual activity: Defer     Other Topics Concern   • None     Social History Narrative           Objective   Physical Exam   Constitutional: He is oriented to person, place, and time. He appears well-developed and well-nourished. He is active.   HENT:   Head: Normocephalic and atraumatic.   Right Ear: Hearing, tympanic membrane, external ear and ear canal normal.   Left Ear: Hearing, tympanic membrane, external ear and ear canal normal.   Nose: Nose normal.   Mouth/Throat: Uvula is midline, oropharynx is clear and moist and mucous membranes are normal.   Eyes: Conjunctivae, EOM and lids are normal. Pupils are equal, round, and reactive to light.   Neck: Trachea normal, normal range of motion, full passive range of motion without pain and phonation normal. Neck supple.   Cardiovascular: Normal rate, regular rhythm and normal heart sounds.    Pulmonary/Chest: Effort normal and breath sounds normal.   Abdominal: Soft. Normal appearance. He exhibits no distension. There is no tenderness. There is no rigidity and no guarding.   Neurological: He is alert and oriented to person, place, and time. GCS eye subscore is 4. GCS verbal subscore is 5. GCS motor subscore is 6.   Skin: Skin is warm, dry and intact.   Psychiatric: His speech is normal and behavior is normal. His mood appears anxious. Cognition and memory are normal.   Nursing note and vitals reviewed.      Procedures         ED Course  ED Course   Value Comment By Time   ECG 12 Lead Sinus rhythm, 108 bpm. QTc 466ms. No ST segment abnormalities concerning for STEMI. Ac  "Brian Bey MD 02/05 0694      [id number] ordered for pt given reported SOB but when I went to speak to pt he had nebulizer laying in his lap not using it. Pt counseled on appropriate use. I once again asked pt if there is anything I can do to help his complaint and pt asks for \"pain pill.\" Pt c/o CP that radiates down his legs bilaterally that has just started and is severe.   0135-Pt states the ativan he was administered earlier was not strong enough to help his anxiety and he needs another dose to help his symptoms. Pt last had a stress test 6 months PTP that was read as a low risk study. HEART score of 2. Pt checked out to Dr KEVIN AGUILAR  Number of Diagnoses or Management Options  Acute hypokalemia: new and requires workup  Anxiety about health: new and requires workup  Chest pain in adult: new and requires workup     Amount and/or Complexity of Data Reviewed  Clinical lab tests: reviewed and ordered  Tests in the radiology section of CPT®: reviewed and ordered  Tests in the medicine section of CPT®: reviewed and ordered  Review and summarize past medical records: yes  Independent visualization of images, tracings, or specimens: yes    Risk of Complications, Morbidity, and/or Mortality  Presenting problems: high  Diagnostic procedures: high  Management options: high    Patient Progress  Patient progress: stable      Final diagnoses:   Chest pain in adult   Anxiety about health   Acute hypokalemia            Arun Patel MD  02/06/18 4985    "

## 2018-02-07 ENCOUNTER — LAB (OUTPATIENT)
Dept: LAB | Facility: HOSPITAL | Age: 48
End: 2018-02-07

## 2018-02-07 ENCOUNTER — HOSPITAL ENCOUNTER (OUTPATIENT)
Dept: GENERAL RADIOLOGY | Facility: HOSPITAL | Age: 48
Discharge: HOME | End: 2018-02-07
Admitting: NURSE PRACTITIONER

## 2018-02-07 ENCOUNTER — OFFICE VISIT (OUTPATIENT)
Dept: FAMILY MEDICINE CLINIC | Facility: CLINIC | Age: 48
End: 2018-02-07

## 2018-02-07 VITALS
HEIGHT: 67 IN | HEART RATE: 86 BPM | TEMPERATURE: 98.4 F | DIASTOLIC BLOOD PRESSURE: 79 MMHG | BODY MASS INDEX: 36.88 KG/M2 | OXYGEN SATURATION: 98 % | WEIGHT: 235 LBS | SYSTOLIC BLOOD PRESSURE: 144 MMHG

## 2018-02-07 DIAGNOSIS — G89.29 CHRONIC BILATERAL LOW BACK PAIN WITH LEFT-SIDED SCIATICA: ICD-10-CM

## 2018-02-07 DIAGNOSIS — I10 ESSENTIAL HYPERTENSION: ICD-10-CM

## 2018-02-07 DIAGNOSIS — E87.6 HYPOKALEMIA: ICD-10-CM

## 2018-02-07 DIAGNOSIS — M79.641 PAIN OF RIGHT HAND: ICD-10-CM

## 2018-02-07 DIAGNOSIS — M54.42 CHRONIC BILATERAL LOW BACK PAIN WITH LEFT-SIDED SCIATICA: ICD-10-CM

## 2018-02-07 DIAGNOSIS — G40.909 SEIZURE DISORDER (HCC): ICD-10-CM

## 2018-02-07 DIAGNOSIS — J45.21 MILD INTERMITTENT ASTHMA WITH ACUTE EXACERBATION: Primary | ICD-10-CM

## 2018-02-07 LAB
ANION GAP SERPL CALCULATED.3IONS-SCNC: 5.5 MMOL/L (ref 3.6–11.2)
BACTERIA SPEC AEROBE CULT: NORMAL
BUN BLD-MCNC: 11 MG/DL (ref 7–21)
BUN/CREAT SERPL: 11.1 (ref 7–25)
CALCIUM SPEC-SCNC: 9 MG/DL (ref 7.7–10)
CHLORIDE SERPL-SCNC: 102 MMOL/L (ref 99–112)
CO2 SERPL-SCNC: 28.5 MMOL/L (ref 24.3–31.9)
CREAT BLD-MCNC: 0.99 MG/DL (ref 0.43–1.29)
GFR SERPL CREATININE-BSD FRML MDRD: 82 ML/MIN/1.73
GLUCOSE BLD-MCNC: 84 MG/DL (ref 70–110)
OSMOLALITY SERPL CALC.SUM OF ELEC: 270.6 MOSM/KG (ref 273–305)
POTASSIUM BLD-SCNC: 4.2 MMOL/L (ref 3.5–5.3)
SODIUM BLD-SCNC: 136 MMOL/L (ref 135–153)

## 2018-02-07 PROCEDURE — 36415 COLL VENOUS BLD VENIPUNCTURE: CPT | Performed by: NURSE PRACTITIONER

## 2018-02-07 PROCEDURE — 73130 X-RAY EXAM OF HAND: CPT

## 2018-02-07 PROCEDURE — 80048 BASIC METABOLIC PNL TOTAL CA: CPT | Performed by: NURSE PRACTITIONER

## 2018-02-07 PROCEDURE — 73130 X-RAY EXAM OF HAND: CPT | Performed by: RADIOLOGY

## 2018-02-07 PROCEDURE — 80185 ASSAY OF PHENYTOIN TOTAL: CPT

## 2018-02-07 PROCEDURE — 99214 OFFICE O/P EST MOD 30 MIN: CPT | Performed by: NURSE PRACTITIONER

## 2018-02-07 PROCEDURE — 80186 ASSAY OF PHENYTOIN FREE: CPT

## 2018-02-07 RX ORDER — CLARITHROMYCIN 500 MG/1
500 TABLET, COATED ORAL EVERY 12 HOURS SCHEDULED
Qty: 20 TABLET | Refills: 0 | Status: SHIPPED | OUTPATIENT
Start: 2018-02-07 | End: 2018-02-17

## 2018-02-07 RX ORDER — HYDROCODONE BITARTRATE AND ACETAMINOPHEN 7.5; 325 MG/1; MG/1
1 TABLET ORAL 2 TIMES DAILY PRN
Qty: 60 TABLET | Refills: 0 | Status: SHIPPED | OUTPATIENT
Start: 2018-02-07 | End: 2018-03-07 | Stop reason: SDUPTHER

## 2018-02-07 RX ORDER — POTASSIUM CHLORIDE 750 MG/1
10 TABLET, FILM COATED, EXTENDED RELEASE ORAL DAILY
Qty: 30 TABLET | Refills: 5 | Status: SHIPPED | OUTPATIENT
Start: 2018-02-07 | End: 2018-05-04

## 2018-02-07 RX ORDER — CELECOXIB 200 MG/1
CAPSULE ORAL
Refills: 2 | COMMUNITY
Start: 2018-01-25 | End: 2018-04-03

## 2018-02-07 NOTE — PROGRESS NOTES
"Subjective   Jaquan Mckay is a 47 y.o. male.     Chief Complaint   Patient presents with   • Hypertension       History of Present Illness     ED follow up-for hypokalemia and CP.  He reports continued muscle weakness and burning in his feet and legs.  He reports he had not been working out with sweating.  He reports he did drink Gatorade while at the gym on Monday prior to the incident.  He denies any vomiting or diarrhea.    Leg Pain-increase today since episode of low potassium on Monday.  He describes as aching all over with altered sensation in his feet.  He is under care of podiatry for some foot complaints and has an upcoming follow up.    Cough-reports has been coughing with some left ear congestion.  Sounds are muffled with some \"ringing\".  Sore throat is present.  Recent strep and Flu screening at the ED on Monday with negative chest xray.  Mild increase in SOA.  PND is present.  Nasal dryness.  Not at goal.     The following portions of the patient's history were reviewed and updated as appropriate: allergies, current medications, past family history, past medical history, past social history, past surgical history and problem list.    Review of Systems   Constitutional: Positive for fatigue. Negative for appetite change and fever.   HENT: Positive for sore throat (mostly in the AM). Negative for congestion, ear pain, postnasal drip, rhinorrhea, sinus pressure and tinnitus.    Eyes: Negative for pain and visual disturbance (wears glasses.  recent eye exam with new glasses).   Respiratory: Positive for cough and shortness of breath. Negative for chest tightness and wheezing.    Cardiovascular: Negative for chest pain and palpitations.   Gastrointestinal: Negative for abdominal pain, constipation, diarrhea and vomiting.   Genitourinary: Positive for dysuria (chronic) and testicular pain (chronic). Negative for hematuria and urgency.   Musculoskeletal: Positive for arthralgias, back pain (with radiation to " "BLE \"down to ankles\" ) and myalgias.   Neurological: Positive for weakness (BLE), numbness (and tingling with \"electricity\" sensation in his legs) and headaches. Negative for dizziness.   Psychiatric/Behavioral: Positive for sleep disturbance. Negative for dysphoric mood and suicidal ideas. The patient is not nervous/anxious.    All other systems reviewed and are negative.      Objective     /79  Pulse 86  Temp 98.4 °F (36.9 °C) (Temporal Artery )   Ht 170.2 cm (67\")  Wt 107 kg (235 lb)  SpO2 98%  BMI 36.81 kg/m2    Physical Exam   Constitutional: He is oriented to person, place, and time. He appears well-developed and well-nourished.   HENT:   Head: Normocephalic and atraumatic.   Right Ear: External ear and ear canal normal. Tympanic membrane is not erythematous.   Left Ear: External ear and ear canal normal. Tympanic membrane is not erythematous.   Nose: Mucosal edema and rhinorrhea present.   Mouth/Throat: No oropharyngeal exudate or posterior oropharyngeal erythema.   Eyes: Conjunctivae and EOM are normal. Pupils are equal, round, and reactive to light. No scleral icterus.   Neck: Normal range of motion. Neck supple. No JVD present. No thyromegaly present.   Cardiovascular: Normal rate, regular rhythm and normal heart sounds.    No murmur heard.  Pulmonary/Chest: Effort normal. No respiratory distress. He has no decreased breath sounds. He has no wheezes. He exhibits no tenderness.   Abdominal: Soft. Bowel sounds are normal. He exhibits no mass. There is no tenderness.   Musculoskeletal: He exhibits no edema.        Right elbow: Tenderness found. Medial epicondyle and olecranon process tenderness noted.        Left knee: He exhibits decreased range of motion and swelling (mild). He exhibits no ecchymosis. Tenderness found. Medial joint line and lateral joint line tenderness noted.        Lumbar back: He exhibits decreased range of motion, tenderness, pain and spasm. He exhibits no swelling.        " Right hand: He exhibits tenderness (3rd digit). He exhibits normal capillary refill.   Gait shuffling with limping noted.  More prominent on right side from behind      Skin Integrity  -  His right foot skin is intact.     Jaquan 's left foot skin is intact. .  Lymphadenopathy:        Head (right side): No submandibular adenopathy present.        Head (left side): No submandibular adenopathy present.     He has no cervical adenopathy.   Neurological: He is alert and oriented to person, place, and time. He has normal reflexes. No cranial nerve deficit. He exhibits normal muscle tone. Coordination normal.   Skin: Skin is warm and dry.   Psychiatric: His speech is normal and behavior is normal. Judgment and thought content normal. His mood appears not anxious. He is not actively hallucinating. Cognition and memory are normal. He exhibits a depressed mood. He expresses no homicidal and no suicidal ideation. He exhibits normal recent memory and normal remote memory. He is attentive.   Vitals reviewed.    Assessment/Plan     Problem List Items Addressed This Visit        Cardiovascular and Mediastinum    Hypertension    Current Assessment & Plan     Stable.  Continue Lisinopril as ordered.  Ambulatory BP monitoring either at home or random community checks.  Patient to report continued elevations >140/90.  Patient may come by office for checks if needed.             Respiratory    Mild intermittent asthma with acute exacerbation - Primary    Relevant Medications    clarithromycin (BIAXIN) 500 MG tablet       Nervous and Auditory    Seizure disorder    Relevant Orders    Phenytoin Level, Total & Free    Chronic bilateral low back pain with left-sided sciatica    Relevant Medications    HYDROcodone-acetaminophen (NORCO) 7.5-325 MG per tablet      Other Visit Diagnoses     Pain of right hand        Relevant Orders    XR Hand 3+ View Right    Hypokalemia        Relevant Medications    potassium chloride (K-DUR) 10 MEQ CR  tablet    Other Relevant Orders    Basic Metabolic Panel        Instructed to complete all of antibiotics for acute illness.  Increase PO fluids, avoid/limit caffeine.  Do not save any of the meds for later use.  Rest PRN  Hospital records reviewed.  Medications reconciled.  Understands disease processes and need for medications.  Understands reasons for urgent and emergent care.  Patient (& family) verbalized agreement for treatment plan.   Steam expectoration, warm compress to sinus, Saline PRN for moisture  CHAVEZ reviewed today and consistent.  Will refill prescribed controlled medication today.  Patient is aware they cannot receive narcotics from any other provider.  Risk and benefits of medication use has been reviewed.  History and physical exam exhibit continued safe and appropriate use of controlled substances.  RTC 1 month, sooner if needed.           This document has been electronically signed by:  BALDOMERO Thao, NAOMI

## 2018-02-07 NOTE — ASSESSMENT & PLAN NOTE
Stable.  Continue Lisinopril as ordered.  Ambulatory BP monitoring either at home or random community checks.  Patient to report continued elevations >140/90.  Patient may come by office for checks if needed.

## 2018-02-10 LAB
PHENYTOIN FREE SERPL-MCNC: 0.9 UG/ML (ref 1–2)
PHENYTOIN SERPL-MCNC: 16.7 UG/ML (ref 10–20)

## 2018-03-02 ENCOUNTER — APPOINTMENT (OUTPATIENT)
Dept: CT IMAGING | Facility: HOSPITAL | Age: 48
End: 2018-03-02

## 2018-03-02 ENCOUNTER — HOSPITAL ENCOUNTER (EMERGENCY)
Facility: HOSPITAL | Age: 48
Discharge: HOME | End: 2018-03-02
Attending: EMERGENCY MEDICINE | Admitting: EMERGENCY MEDICINE

## 2018-03-02 VITALS
DIASTOLIC BLOOD PRESSURE: 79 MMHG | HEIGHT: 67 IN | TEMPERATURE: 98 F | RESPIRATION RATE: 16 BRPM | SYSTOLIC BLOOD PRESSURE: 122 MMHG | HEART RATE: 75 BPM | BODY MASS INDEX: 34.21 KG/M2 | OXYGEN SATURATION: 100 % | WEIGHT: 218 LBS

## 2018-03-02 DIAGNOSIS — H81.10 POSTURAL VERTIGO, UNSPECIFIED LATERALITY: ICD-10-CM

## 2018-03-02 DIAGNOSIS — R42 DIZZINESS: Primary | ICD-10-CM

## 2018-03-02 LAB
6-ACETYL MORPHINE: NEGATIVE
ALBUMIN SERPL-MCNC: 4.4 G/DL (ref 3.5–5)
ALBUMIN/GLOB SERPL: 1.3 G/DL (ref 1.5–2.5)
ALP SERPL-CCNC: 85 U/L (ref 40–129)
ALT SERPL W P-5'-P-CCNC: 35 U/L (ref 10–44)
AMPHET+METHAMPHET UR QL: NEGATIVE
ANION GAP SERPL CALCULATED.3IONS-SCNC: 7.2 MMOL/L (ref 3.6–11.2)
AST SERPL-CCNC: 32 U/L (ref 10–34)
BARBITURATES UR QL SCN: NEGATIVE
BASOPHILS # BLD AUTO: 0.01 10*3/MM3 (ref 0–0.3)
BASOPHILS NFR BLD AUTO: 0.1 % (ref 0–2)
BENZODIAZ UR QL SCN: NEGATIVE
BILIRUB SERPL-MCNC: 0.6 MG/DL (ref 0.2–1.8)
BILIRUB UR QL STRIP: NEGATIVE
BUN BLD-MCNC: 14 MG/DL (ref 7–21)
BUN/CREAT SERPL: 16.9 (ref 7–25)
BUPRENORPHINE SERPL-MCNC: NEGATIVE NG/ML
CALCIUM SPEC-SCNC: 8.8 MG/DL (ref 7.7–10)
CANNABINOIDS SERPL QL: NEGATIVE
CHLORIDE SERPL-SCNC: 102 MMOL/L (ref 99–112)
CLARITY UR: CLEAR
CO2 SERPL-SCNC: 28.8 MMOL/L (ref 24.3–31.9)
COCAINE UR QL: NEGATIVE
COLOR UR: YELLOW
CREAT BLD-MCNC: 0.83 MG/DL (ref 0.43–1.29)
DEPRECATED RDW RBC AUTO: 42.3 FL (ref 37–54)
EOSINOPHIL # BLD AUTO: 0.18 10*3/MM3 (ref 0–0.7)
EOSINOPHIL NFR BLD AUTO: 1.9 % (ref 0–5)
ERYTHROCYTE [DISTWIDTH] IN BLOOD BY AUTOMATED COUNT: 12.6 % (ref 11.5–14.5)
GFR SERPL CREATININE-BSD FRML MDRD: 100 ML/MIN/1.73
GLOBULIN UR ELPH-MCNC: 3.5 GM/DL
GLUCOSE BLD-MCNC: 93 MG/DL (ref 70–110)
GLUCOSE UR STRIP-MCNC: NEGATIVE MG/DL
HCT VFR BLD AUTO: 47.2 % (ref 42–52)
HGB BLD-MCNC: 16.6 G/DL (ref 14–18)
HGB UR QL STRIP.AUTO: NEGATIVE
IMM GRANULOCYTES # BLD: 0.01 10*3/MM3 (ref 0–0.03)
IMM GRANULOCYTES NFR BLD: 0.1 % (ref 0–0.5)
KETONES UR QL STRIP: NEGATIVE
LEUKOCYTE ESTERASE UR QL STRIP.AUTO: NEGATIVE
LYMPHOCYTES # BLD AUTO: 1.55 10*3/MM3 (ref 1–3)
LYMPHOCYTES NFR BLD AUTO: 16.8 % (ref 21–51)
MCH RBC QN AUTO: 32.9 PG (ref 27–33)
MCHC RBC AUTO-ENTMCNC: 35.2 G/DL (ref 33–37)
MCV RBC AUTO: 93.7 FL (ref 80–94)
METHADONE UR QL SCN: NEGATIVE
MONOCYTES # BLD AUTO: 1.05 10*3/MM3 (ref 0.1–0.9)
MONOCYTES NFR BLD AUTO: 11.4 % (ref 0–10)
NEUTROPHILS # BLD AUTO: 6.44 10*3/MM3 (ref 1.4–6.5)
NEUTROPHILS NFR BLD AUTO: 69.7 % (ref 30–70)
NITRITE UR QL STRIP: NEGATIVE
OPIATES UR QL: POSITIVE
OSMOLALITY SERPL CALC.SUM OF ELEC: 275.8 MOSM/KG (ref 273–305)
OXYCODONE UR QL SCN: NEGATIVE
PCP UR QL SCN: NEGATIVE
PH UR STRIP.AUTO: <=5 [PH] (ref 5–8)
PHENYTOIN SERPL-MCNC: 17.4 MCG/ML (ref 10–20)
PLATELET # BLD AUTO: 177 10*3/MM3 (ref 130–400)
PMV BLD AUTO: 10.3 FL (ref 6–10)
POTASSIUM BLD-SCNC: 4.5 MMOL/L (ref 3.5–5.3)
PROT SERPL-MCNC: 7.9 G/DL (ref 6–8)
PROT UR QL STRIP: NEGATIVE
RBC # BLD AUTO: 5.04 10*6/MM3 (ref 4.7–6.1)
SODIUM BLD-SCNC: 138 MMOL/L (ref 135–153)
SP GR UR STRIP: 1.02 (ref 1–1.03)
TROPONIN I SERPL-MCNC: <0.006 NG/ML
UROBILINOGEN UR QL STRIP: NORMAL
WBC NRBC COR # BLD: 9.24 10*3/MM3 (ref 4.5–12.5)

## 2018-03-02 PROCEDURE — 81003 URINALYSIS AUTO W/O SCOPE: CPT | Performed by: PHYSICIAN ASSISTANT

## 2018-03-02 PROCEDURE — 80053 COMPREHEN METABOLIC PANEL: CPT | Performed by: PHYSICIAN ASSISTANT

## 2018-03-02 PROCEDURE — 70450 CT HEAD/BRAIN W/O DYE: CPT | Performed by: RADIOLOGY

## 2018-03-02 PROCEDURE — 80307 DRUG TEST PRSMV CHEM ANLYZR: CPT | Performed by: PHYSICIAN ASSISTANT

## 2018-03-02 PROCEDURE — 99284 EMERGENCY DEPT VISIT MOD MDM: CPT

## 2018-03-02 PROCEDURE — 96360 HYDRATION IV INFUSION INIT: CPT

## 2018-03-02 PROCEDURE — 70450 CT HEAD/BRAIN W/O DYE: CPT

## 2018-03-02 PROCEDURE — 93005 ELECTROCARDIOGRAM TRACING: CPT | Performed by: PHYSICIAN ASSISTANT

## 2018-03-02 PROCEDURE — 84484 ASSAY OF TROPONIN QUANT: CPT | Performed by: PHYSICIAN ASSISTANT

## 2018-03-02 PROCEDURE — 80185 ASSAY OF PHENYTOIN TOTAL: CPT | Performed by: PHYSICIAN ASSISTANT

## 2018-03-02 PROCEDURE — 85025 COMPLETE CBC W/AUTO DIFF WBC: CPT | Performed by: PHYSICIAN ASSISTANT

## 2018-03-02 RX ORDER — MECLIZINE HYDROCHLORIDE 25 MG/1
25 TABLET ORAL EVERY 6 HOURS PRN
Qty: 20 TABLET | Refills: 0 | Status: SHIPPED | OUTPATIENT
Start: 2018-03-02 | End: 2018-05-04 | Stop reason: ALTCHOICE

## 2018-03-02 RX ORDER — MECLIZINE HCL 12.5 MG/1
25 TABLET ORAL ONCE
Status: COMPLETED | OUTPATIENT
Start: 2018-03-02 | End: 2018-03-02

## 2018-03-02 RX ORDER — SODIUM CHLORIDE 0.9 % (FLUSH) 0.9 %
10 SYRINGE (ML) INJECTION AS NEEDED
Status: DISCONTINUED | OUTPATIENT
Start: 2018-03-02 | End: 2018-03-02 | Stop reason: HOSPADM

## 2018-03-02 RX ADMIN — SODIUM CHLORIDE 1000 ML: 9 INJECTION, SOLUTION INTRAVENOUS at 14:56

## 2018-03-02 RX ADMIN — MECLIZINE HCL 25 MG: 12.5 TABLET ORAL at 15:53

## 2018-03-02 NOTE — ED PROVIDER NOTES
"Subjective   HPI Comments: 46 y/o male that comes in with c/c \"Dizziness\" X one day. Patient states that he has had intermittent dizziness over past day. Does state that seems to worsen with movement. Denies any fever, chills, chest pain, SOA. Patient is stable at this time.     Patient is a 47 y.o. male presenting with dizziness.   History provided by:  Patient   used: No    Dizziness   Quality:  Lightheadedness  Severity:  Mild  Onset quality:  Sudden  Duration:  1 day  Timing:  Intermittent  Progression:  Worsening  Chronicity:  New  Context: bending over and standing up    Context: not with ear pain, not with eye movement, not with inactivity, not with loss of consciousness and not with physical activity    Relieved by:  Nothing  Worsened by:  Nothing  Ineffective treatments:  None tried  Associated symptoms: no chest pain, no diarrhea, no headaches, no nausea, no palpitations, no shortness of breath, no vision changes and no vomiting    Risk factors: no hx of vertigo and no Meniere's disease        Review of Systems   Respiratory: Negative for shortness of breath.    Cardiovascular: Negative for chest pain and palpitations.   Gastrointestinal: Negative for diarrhea, nausea and vomiting.   Neurological: Positive for dizziness. Negative for headaches.   All other systems reviewed and are negative.      Past Medical History:   Diagnosis Date   • Allergic    • Anxiety    • Arthritis    • Asthma    • Body piercing     REPORTS CYLICONE IN EARS   • Clotting disorder 2004    had a knee surgery   • Depression    • DVT (deep venous thrombosis)     RIGHT RIGHT KNEE AFTER SURGERY YEARS AGO IN 2001 OR 2004   • Gastric ulcer    • GERD (gastroesophageal reflux disease)    • H/O migraine    • H/O sleep apnea     REPORTS DIAGNOSED WITH SLEEP APNEA AND COULDN'T STAND TO WEAR THE MACHINE   • Headache    • Heart attack     REPORTS \"LIGHT HEART ATTACK A LONG TIME AGO\"  \"EARLY 90'S\"   • History of seizures     " REPORTS LAST EPISODE WAS AROUND 1995.   • Hostility    • Hyperlipidemia    • Hypertension    • Knee pain, acute     Left   • Low back pain    • Migraine    • MRSA (methicillin resistant Staphylococcus aureus)     REPORTS LAST TESTED + 2004. WAS TREATED HE REPORTS.  RIGHT ARM, RIGHT KNEE.   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    • Ciprofloxacin    • Contrast Dye    • Fish-Derived Products Hives   • Mobic [Meloxicam]    • Penicillins    • Prednisone    • Robitussin Cough+ Chest Max St  [Dextromethorphan-Guaifenesin]    • Sulfa Antibiotics    • Zoloft [Sertraline Hcl] Hives and Itching   • Gabapentin Rash   • Keflex [Cephalexin] Rash   • Metoprolol Rash   • Peanut-Containing Drug Products Rash   • Shrimp (Diagnostic) Rash       Past Surgical History:   Procedure Laterality Date   • BACK SURGERY     • BRAIN SURGERY  1986    Tumor removal    • CARDIAC SURGERY      CARDIAC CATH REPORTED AS 2 MONTHS AGO IN [place]. REPORTS NO STENTS PLACED.   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       Family History   Problem Relation Age of Onset   • Diabetes Mother    • Hypertension Mother    • Stroke Mother    • Diabetes Father    • Skin cancer Father    • Hypertension Father    • Heart attack Father    • Diabetes Brother    • Hypertension Brother    • Heart disease Maternal Aunt    • Heart  disease Maternal Uncle    • Heart disease Paternal Aunt    • Heart disease Paternal Uncle    • Heart disease Maternal Grandmother    • Heart disease Maternal Grandfather    • Heart disease Paternal Grandmother    • Heart disease Paternal Grandfather        Social History     Social History   • Marital status:    • Number of children: 2   • Years of education: 12     Occupational History   • DISABLED      Social History Main Topics   • Smoking status: Current Every Day Smoker     Packs/day: 1.00     Years: 7.00     Types: Cigars, Cigarettes     Start date: 5/5/2010   • Smokeless tobacco: Never Used   • Alcohol use No   • Drug use: No   • Sexual activity: Defer           Objective   Physical Exam   Constitutional: He is oriented to person, place, and time. He appears well-developed and well-nourished.   HENT:   Head: Normocephalic and atraumatic.   Right Ear: External ear normal.   Left Ear: External ear normal.   Nose: Nose normal.   Mouth/Throat: Oropharynx is clear and moist.   Eyes: Conjunctivae and EOM are normal. Pupils are equal, round, and reactive to light.   Neck: Normal range of motion. Neck supple.   Cardiovascular: Normal rate, regular rhythm, normal heart sounds and intact distal pulses.  Exam reveals no friction rub.    No murmur heard.  Pulmonary/Chest: Effort normal and breath sounds normal.   Abdominal: Soft. Bowel sounds are normal.   Musculoskeletal: Normal range of motion. He exhibits no edema or deformity.        Lumbar back: He exhibits no deformity.   Neurological: He is alert and oriented to person, place, and time. He has normal reflexes.   Skin: Skin is warm and dry.   Psychiatric: He has a normal mood and affect. His behavior is normal. Judgment and thought content normal.   Nursing note and vitals reviewed.      Procedures         ED Course  ED Course                  MDM  Number of Diagnoses or Management Options  Dizziness: new and requires workup  Postural vertigo, unspecified  laterality: new and requires workup     Amount and/or Complexity of Data Reviewed  Independent visualization of images, tracings, or specimens: yes    Risk of Complications, Morbidity, and/or Mortality  Presenting problems: moderate  Diagnostic procedures: moderate  Management options: moderate    Patient Progress  Patient progress: stable      Final diagnoses:   Dizziness   Postural vertigo, unspecified laterality            Ac Golden PA-C  03/02/18 1556

## 2018-03-07 ENCOUNTER — OFFICE VISIT (OUTPATIENT)
Dept: FAMILY MEDICINE CLINIC | Facility: CLINIC | Age: 48
End: 2018-03-07

## 2018-03-07 VITALS
OXYGEN SATURATION: 97 % | WEIGHT: 228 LBS | BODY MASS INDEX: 35.79 KG/M2 | HEIGHT: 67 IN | SYSTOLIC BLOOD PRESSURE: 120 MMHG | TEMPERATURE: 98.3 F | HEART RATE: 89 BPM | DIASTOLIC BLOOD PRESSURE: 84 MMHG

## 2018-03-07 DIAGNOSIS — M54.42 CHRONIC BILATERAL LOW BACK PAIN WITH LEFT-SIDED SCIATICA: ICD-10-CM

## 2018-03-07 DIAGNOSIS — M25.562 ACUTE PAIN OF LEFT KNEE: Primary | ICD-10-CM

## 2018-03-07 DIAGNOSIS — J45.40 MODERATE PERSISTENT ASTHMA WITHOUT COMPLICATION: ICD-10-CM

## 2018-03-07 DIAGNOSIS — H93.8X3 IRRITATION OF BOTH EARS: ICD-10-CM

## 2018-03-07 DIAGNOSIS — G89.29 CHRONIC BILATERAL LOW BACK PAIN WITH LEFT-SIDED SCIATICA: ICD-10-CM

## 2018-03-07 PROCEDURE — 99214 OFFICE O/P EST MOD 30 MIN: CPT | Performed by: NURSE PRACTITIONER

## 2018-03-07 RX ORDER — OFLOXACIN 3 MG/ML
5 SOLUTION AURICULAR (OTIC) 2 TIMES DAILY
Qty: 10 ML | Refills: 0 | Status: SHIPPED | OUTPATIENT
Start: 2018-03-07 | End: 2018-03-14

## 2018-03-07 RX ORDER — BUDESONIDE AND FORMOTEROL FUMARATE DIHYDRATE 160; 4.5 UG/1; UG/1
2 AEROSOL RESPIRATORY (INHALATION) 2 TIMES DAILY
Qty: 10.2 G | Refills: 5 | Status: ON HOLD | OUTPATIENT
Start: 2018-03-07 | End: 2018-09-25

## 2018-03-07 RX ORDER — HYDROCODONE BITARTRATE AND ACETAMINOPHEN 7.5; 325 MG/1; MG/1
1 TABLET ORAL 2 TIMES DAILY PRN
Qty: 60 TABLET | Refills: 0 | Status: SHIPPED | OUTPATIENT
Start: 2018-03-07 | End: 2018-04-03 | Stop reason: SDUPTHER

## 2018-03-07 RX ORDER — PHENTERMINE HYDROCHLORIDE 37.5 MG/1
37.5 TABLET ORAL
Qty: 30 TABLET | Refills: 0 | Status: SHIPPED | OUTPATIENT
Start: 2018-03-07 | End: 2018-05-02 | Stop reason: SDUPTHER

## 2018-03-07 RX ORDER — MECLIZINE HCL 25 MG/1
TABLET, CHEWABLE ORAL
Refills: 0 | COMMUNITY
Start: 2018-03-02 | End: 2018-03-21

## 2018-03-07 NOTE — PROGRESS NOTES
"Subjective   Jaquan Mckay is a 47 y.o. male.     Chief Complaint   Patient presents with   • Knee Pain   • Asthma   • Anxiety       History of Present Illness     Hospital Follow up-for vertigo.  Reports symptoms lasted approx 5 days.  He was treated with meds.  He did use a walker for support during that time.    Knee pain-He reports it continues to hurt and has numbness.  He feels \"it is not getting better\".   He request a second opinion.  He reports he would like to see Dr Dubin.    Ankle Pain-ongoing.  Has been to podiatry.  Has been ordered orthotics.  He does have a follow up.    Left ear complaint-ear clogged he reports today since his last \"sinusitis\".  Reports he was told it was \"bright red\" at the hospital.      The following portions of the patient's history were reviewed and updated as appropriate: allergies, current medications, past family history, past medical history, past social history, past surgical history and problem list.    Review of Systems   Constitutional: Positive for fatigue. Negative for appetite change and fever.   HENT: Positive for sore throat (mostly in the AM). Negative for congestion, ear pain, postnasal drip, rhinorrhea, sinus pressure and tinnitus.    Eyes: Negative for pain and visual disturbance (wears glasses.  recent eye exam with new glasses).   Respiratory: Positive for cough and shortness of breath. Negative for chest tightness and wheezing.    Cardiovascular: Negative for chest pain and palpitations.   Gastrointestinal: Negative for abdominal pain, constipation, diarrhea and vomiting.   Genitourinary: Positive for dysuria (chronic) and testicular pain (chronic). Negative for hematuria and urgency.   Musculoskeletal: Positive for arthralgias, back pain (with radiation to BLE \"down to ankles\" ) and myalgias.   Neurological: Positive for weakness (BLE), numbness (and tingling with \"electricity\" sensation in his legs) and headaches. Negative for dizziness. " "  Psychiatric/Behavioral: Positive for sleep disturbance. Negative for dysphoric mood and suicidal ideas. The patient is not nervous/anxious.    All other systems reviewed and are negative.      Objective     /84  Pulse 89  Temp 98.3 °F (36.8 °C) (Tympanic)   Ht 170.2 cm (67\")  Wt 103 kg (228 lb)  SpO2 97%  BMI 35.71 kg/m2    Physical Exam   Constitutional: He is oriented to person, place, and time. He appears well-developed and well-nourished.   HENT:   Head: Normocephalic and atraumatic.   Right Ear: External ear and ear canal normal. Tympanic membrane is not erythematous.   Left Ear: External ear and ear canal normal. Tympanic membrane is not erythematous.   Nose: Mucosal edema and rhinorrhea present.   Mouth/Throat: No oropharyngeal exudate or posterior oropharyngeal erythema.   Eyes: Conjunctivae and EOM are normal. Pupils are equal, round, and reactive to light. No scleral icterus.   Neck: Normal range of motion. Neck supple. No JVD present. No thyromegaly present.   Cardiovascular: Normal rate, regular rhythm and normal heart sounds.    No murmur heard.  Pulmonary/Chest: Effort normal. No respiratory distress. He has no decreased breath sounds. He has no wheezes. He exhibits no tenderness.   Abdominal: Soft. Bowel sounds are normal. He exhibits no mass. There is no tenderness.   Musculoskeletal: He exhibits no edema.        Right elbow: Tenderness found. Medial epicondyle and olecranon process tenderness noted.        Left knee: He exhibits decreased range of motion and swelling (mild). He exhibits no ecchymosis. Tenderness found. Medial joint line and lateral joint line tenderness noted.        Lumbar back: He exhibits decreased range of motion, tenderness, pain and spasm. He exhibits no swelling.        Right hand: He exhibits tenderness (3rd digit). He exhibits normal capillary refill.   Gait shuffling with limping noted.  More prominent on right side from behind      Skin Integrity  -  His " right foot skin is intact.     Jaquan 's left foot skin is intact. .  Lymphadenopathy:        Head (right side): No submandibular adenopathy present.        Head (left side): No submandibular adenopathy present.     He has no cervical adenopathy.   Neurological: He is alert and oriented to person, place, and time. He has normal reflexes. No cranial nerve deficit. He exhibits normal muscle tone. Coordination normal.   Skin: Skin is warm and dry.   Psychiatric: His speech is normal and behavior is normal. Judgment and thought content normal. His mood appears not anxious. He is not actively hallucinating. Cognition and memory are normal. He exhibits a depressed mood. He expresses no homicidal and no suicidal ideation. He exhibits normal recent memory and normal remote memory. He is attentive.   Vitals reviewed.    Assessment/Plan     Problem List Items Addressed This Visit        Nervous and Auditory    Chronic bilateral low back pain with left-sided sciatica    Relevant Medications    HYDROcodone-acetaminophen (NORCO) 7.5-325 MG per tablet       Other    BMI 35.0-35.9,adult    Relevant Medications    phentermine (ADIPEX-P) 37.5 MG tablet      Other Visit Diagnoses     Acute pain of left knee    -  Primary    Relevant Orders    Ambulatory Referral to Orthopedic Surgery    Moderate persistent asthma without complication        Relevant Medications    budesonide-formoterol (SYMBICORT) 160-4.5 MCG/ACT inhaler    Irritation of both ears        Relevant Medications    ofloxacin (FLOXIN OTIC) 0.3 % otic solution        Reviewed most recent xray's of hand with patient.  Discussed any abnormal findings.  Patient's questions answered.  Hospital records reviewed.  Medications reconciled.  Understands disease processes and need for medications.  Understands reasons for urgent and emergent care.  Patient (& family) verbalized agreement for treatment plan.   CHAVEZ reviewed today and consistent.  Will refill prescribed controlled  medication today.  Patient is aware they cannot receive narcotics from any other provider except if under care of pain management or speciality clinic.  Risk and benefits of medication use has been reviewed.  History and physical exam exhibit continued safe and appropriate use of controlled substances.  Continue to work on diet and to be active as possible.  Increase protein and cut CHO intake.  Push fluids.  RTC 1 month, sooner if needed.           This document has been electronically signed by:  BALDOMERO Thao, FNP-C

## 2018-03-14 ENCOUNTER — HOSPITAL ENCOUNTER (EMERGENCY)
Facility: HOSPITAL | Age: 48
Discharge: HOME | End: 2018-03-14
Attending: EMERGENCY MEDICINE | Admitting: EMERGENCY MEDICINE

## 2018-03-14 ENCOUNTER — APPOINTMENT (OUTPATIENT)
Dept: GENERAL RADIOLOGY | Facility: HOSPITAL | Age: 48
End: 2018-03-14

## 2018-03-14 VITALS
WEIGHT: 220 LBS | BODY MASS INDEX: 34.53 KG/M2 | DIASTOLIC BLOOD PRESSURE: 93 MMHG | OXYGEN SATURATION: 100 % | RESPIRATION RATE: 18 BRPM | SYSTOLIC BLOOD PRESSURE: 145 MMHG | TEMPERATURE: 97.5 F | HEIGHT: 67 IN | HEART RATE: 101 BPM

## 2018-03-14 DIAGNOSIS — R07.89 NON-CARDIAC CHEST PAIN: Primary | ICD-10-CM

## 2018-03-14 LAB
6-ACETYL MORPHINE: NEGATIVE
ALBUMIN SERPL-MCNC: 4.5 G/DL (ref 3.5–5)
ALBUMIN/GLOB SERPL: 1.2 G/DL (ref 1.5–2.5)
ALP SERPL-CCNC: 82 U/L (ref 40–129)
ALT SERPL W P-5'-P-CCNC: 30 U/L (ref 10–44)
AMPHET+METHAMPHET UR QL: POSITIVE
ANION GAP SERPL CALCULATED.3IONS-SCNC: 10.8 MMOL/L (ref 3.6–11.2)
AST SERPL-CCNC: 27 U/L (ref 10–34)
BARBITURATES UR QL SCN: NEGATIVE
BASOPHILS # BLD AUTO: 0.01 10*3/MM3 (ref 0–0.3)
BASOPHILS NFR BLD AUTO: 0.1 % (ref 0–2)
BENZODIAZ UR QL SCN: NEGATIVE
BILIRUB SERPL-MCNC: 0.3 MG/DL (ref 0.2–1.8)
BILIRUB UR QL STRIP: NEGATIVE
BUN BLD-MCNC: 18 MG/DL (ref 7–21)
BUN/CREAT SERPL: 20.5 (ref 7–25)
BUPRENORPHINE SERPL-MCNC: NEGATIVE NG/ML
CALCIUM SPEC-SCNC: 9.6 MG/DL (ref 7.7–10)
CANNABINOIDS SERPL QL: NEGATIVE
CHLORIDE SERPL-SCNC: 102 MMOL/L (ref 99–112)
CK MB SERPL-CCNC: 2.12 NG/ML (ref 0–5)
CK MB SERPL-RTO: 1.6 % (ref 0–3)
CK SERPL-CCNC: 133 U/L (ref 24–204)
CLARITY UR: CLEAR
CO2 SERPL-SCNC: 24.2 MMOL/L (ref 24.3–31.9)
COCAINE UR QL: NEGATIVE
COLOR UR: YELLOW
CREAT BLD-MCNC: 0.88 MG/DL (ref 0.43–1.29)
D DIMER PPP FEU-MCNC: <0.27 MCGFEU/ML (ref 0–0.5)
DEPRECATED RDW RBC AUTO: 40.9 FL (ref 37–54)
EOSINOPHIL # BLD AUTO: 0.02 10*3/MM3 (ref 0–0.7)
EOSINOPHIL NFR BLD AUTO: 0.2 % (ref 0–5)
ERYTHROCYTE [DISTWIDTH] IN BLOOD BY AUTOMATED COUNT: 12.5 % (ref 11.5–14.5)
GFR SERPL CREATININE-BSD FRML MDRD: 94 ML/MIN/1.73
GLOBULIN UR ELPH-MCNC: 3.8 GM/DL
GLUCOSE BLD-MCNC: 94 MG/DL (ref 70–110)
GLUCOSE UR STRIP-MCNC: NEGATIVE MG/DL
HCT VFR BLD AUTO: 45.4 % (ref 42–52)
HGB BLD-MCNC: 16.5 G/DL (ref 14–18)
HGB UR QL STRIP.AUTO: NEGATIVE
HOLD SPECIMEN: NORMAL
HOLD SPECIMEN: NORMAL
IMM GRANULOCYTES # BLD: 0.01 10*3/MM3 (ref 0–0.03)
IMM GRANULOCYTES NFR BLD: 0.1 % (ref 0–0.5)
KETONES UR QL STRIP: NEGATIVE
LEUKOCYTE ESTERASE UR QL STRIP.AUTO: NEGATIVE
LYMPHOCYTES # BLD AUTO: 1.38 10*3/MM3 (ref 1–3)
LYMPHOCYTES NFR BLD AUTO: 16.3 % (ref 21–51)
MCH RBC QN AUTO: 33 PG (ref 27–33)
MCHC RBC AUTO-ENTMCNC: 36.3 G/DL (ref 33–37)
MCV RBC AUTO: 90.8 FL (ref 80–94)
METHADONE UR QL SCN: NEGATIVE
MONOCYTES # BLD AUTO: 1.02 10*3/MM3 (ref 0.1–0.9)
MONOCYTES NFR BLD AUTO: 12 % (ref 0–10)
MYOGLOBIN SERPL-MCNC: 60 NG/ML (ref 0–109)
NEUTROPHILS # BLD AUTO: 6.05 10*3/MM3 (ref 1.4–6.5)
NEUTROPHILS NFR BLD AUTO: 71.3 % (ref 30–70)
NITRITE UR QL STRIP: NEGATIVE
OPIATES UR QL: NEGATIVE
OSMOLALITY SERPL CALC.SUM OF ELEC: 275.5 MOSM/KG (ref 273–305)
OXYCODONE UR QL SCN: NEGATIVE
PCP UR QL SCN: NEGATIVE
PH UR STRIP.AUTO: 5.5 [PH] (ref 5–8)
PLATELET # BLD AUTO: 218 10*3/MM3 (ref 130–400)
PMV BLD AUTO: 9.7 FL (ref 6–10)
POTASSIUM BLD-SCNC: 3.6 MMOL/L (ref 3.5–5.3)
PROT SERPL-MCNC: 8.3 G/DL (ref 6–8)
PROT UR QL STRIP: NEGATIVE
RBC # BLD AUTO: 5 10*6/MM3 (ref 4.7–6.1)
SODIUM BLD-SCNC: 137 MMOL/L (ref 135–153)
SP GR UR STRIP: 1.02 (ref 1–1.03)
TROPONIN I SERPL-MCNC: 0.01 NG/ML
TSH SERPL DL<=0.05 MIU/L-ACNC: 2.15 MIU/ML (ref 0.55–4.78)
UROBILINOGEN UR QL STRIP: NORMAL
WBC NRBC COR # BLD: 8.49 10*3/MM3 (ref 4.5–12.5)
WHOLE BLOOD HOLD SPECIMEN: NORMAL
WHOLE BLOOD HOLD SPECIMEN: NORMAL

## 2018-03-14 PROCEDURE — 84484 ASSAY OF TROPONIN QUANT: CPT | Performed by: PHYSICIAN ASSISTANT

## 2018-03-14 PROCEDURE — 85379 FIBRIN DEGRADATION QUANT: CPT | Performed by: PHYSICIAN ASSISTANT

## 2018-03-14 PROCEDURE — 93010 ELECTROCARDIOGRAM REPORT: CPT | Performed by: INTERNAL MEDICINE

## 2018-03-14 PROCEDURE — 80050 GENERAL HEALTH PANEL: CPT | Performed by: PHYSICIAN ASSISTANT

## 2018-03-14 PROCEDURE — 71045 X-RAY EXAM CHEST 1 VIEW: CPT | Performed by: RADIOLOGY

## 2018-03-14 PROCEDURE — 82550 ASSAY OF CK (CPK): CPT | Performed by: PHYSICIAN ASSISTANT

## 2018-03-14 PROCEDURE — 80307 DRUG TEST PRSMV CHEM ANLYZR: CPT | Performed by: PHYSICIAN ASSISTANT

## 2018-03-14 PROCEDURE — 83874 ASSAY OF MYOGLOBIN: CPT | Performed by: PHYSICIAN ASSISTANT

## 2018-03-14 PROCEDURE — 82553 CREATINE MB FRACTION: CPT | Performed by: PHYSICIAN ASSISTANT

## 2018-03-14 PROCEDURE — 71045 X-RAY EXAM CHEST 1 VIEW: CPT

## 2018-03-14 PROCEDURE — 81003 URINALYSIS AUTO W/O SCOPE: CPT | Performed by: PHYSICIAN ASSISTANT

## 2018-03-14 PROCEDURE — 99284 EMERGENCY DEPT VISIT MOD MDM: CPT

## 2018-03-14 PROCEDURE — 93005 ELECTROCARDIOGRAM TRACING: CPT | Performed by: EMERGENCY MEDICINE

## 2018-03-14 RX ORDER — SODIUM CHLORIDE 0.9 % (FLUSH) 0.9 %
10 SYRINGE (ML) INJECTION AS NEEDED
Status: DISCONTINUED | OUTPATIENT
Start: 2018-03-14 | End: 2018-03-14 | Stop reason: HOSPADM

## 2018-03-14 RX ORDER — ACETAMINOPHEN 500 MG
1000 TABLET ORAL ONCE
Status: COMPLETED | OUTPATIENT
Start: 2018-03-14 | End: 2018-03-14

## 2018-03-14 RX ORDER — ASPIRIN 81 MG/1
81 TABLET ORAL DAILY
Qty: 30 TABLET | Refills: 0 | Status: SHIPPED | OUTPATIENT
Start: 2018-03-14 | End: 2018-03-21 | Stop reason: SDUPTHER

## 2018-03-14 RX ADMIN — ACETAMINOPHEN 1000 MG: 500 TABLET ORAL at 21:04

## 2018-03-15 ENCOUNTER — TRANSCRIBE ORDERS (OUTPATIENT)
Dept: ADMINISTRATIVE | Facility: HOSPITAL | Age: 48
End: 2018-03-15

## 2018-03-15 DIAGNOSIS — I20.9 CARDIAC ANGINA (HCC): Primary | ICD-10-CM

## 2018-03-15 NOTE — ED PROVIDER NOTES
"Subjective     History provided by:  Patient  Chest Pain   Pain location:  Substernal area  Pain quality: aching    Pain radiates to:  Does not radiate  Pain severity:  Moderate  Onset quality:  Sudden  Duration:  4 hours  Timing:  Intermittent  Progression:  Waxing and waning  Chronicity:  New  Relieved by:  Nothing  Associated symptoms: no abdominal pain, no cough, no diaphoresis, no fever and no palpitations    Risk factors: high cholesterol, hypertension, obesity and smoking        Review of Systems   Constitutional: Negative.  Negative for diaphoresis and fever.   HENT: Negative.    Respiratory: Negative.  Negative for cough.    Cardiovascular: Positive for chest pain. Negative for palpitations.   Gastrointestinal: Negative.  Negative for abdominal pain.   Endocrine: Negative.    Genitourinary: Negative.  Negative for dysuria.   Skin: Negative.    Neurological: Negative.    Psychiatric/Behavioral: Negative.    All other systems reviewed and are negative.      Past Medical History:   Diagnosis Date   • Allergic    • Anxiety    • Arthritis    • Asthma    • Body piercing     REPORTS CYLICONE IN EARS   • Clotting disorder 2004    had a knee surgery   • Depression    • DVT (deep venous thrombosis)     RIGHT RIGHT KNEE AFTER SURGERY YEARS AGO IN 2001 OR 2004   • Gastric ulcer    • GERD (gastroesophageal reflux disease)    • H/O migraine    • H/O sleep apnea     REPORTS DIAGNOSED WITH SLEEP APNEA AND COULDN'T STAND TO WEAR THE MACHINE   • Headache    • Heart attack     REPORTS \"LIGHT HEART ATTACK A LONG TIME AGO\"  \"EARLY 90'S\"   • History of seizures     REPORTS LAST EPISODE WAS AROUND 1995.   • Hostility    • Hyperlipidemia    • Hypertension    • Knee pain, acute     Left   • Low back pain    • Migraine    • MRSA (methicillin resistant Staphylococcus aureus)     REPORTS LAST TESTED + 2004. WAS TREATED HE REPORTS.  RIGHT ARM, RIGHT KNEE.   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    • Ciprofloxacin    • Contrast Dye    • Fish-Derived Products Hives   • Mobic [Meloxicam]    • Penicillins    • Prednisone    • Robitussin Cough+ Chest Max St  [Dextromethorphan-Guaifenesin]    • Sulfa Antibiotics    • Zoloft [Sertraline Hcl] Hives and Itching   • Gabapentin Rash   • Keflex [Cephalexin] Rash   • Metoprolol Rash   • Peanut-Containing Drug Products Rash   • Shrimp (Diagnostic) Rash       Past Surgical History:   Procedure Laterality Date   • BACK SURGERY     • BRAIN SURGERY  1986    Tumor removal    • CARDIAC SURGERY      CARDIAC CATH REPORTED AS 2 MONTHS AGO IN [place]. REPORTS NO STENTS PLACED.   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       Family History   Problem Relation Age of Onset   • Diabetes Mother    • Hypertension Mother    • Stroke Mother    • Diabetes Father    • Skin cancer Father    • Hypertension Father    • Heart attack Father    • Diabetes Brother    • Hypertension Brother    • Heart disease Maternal Aunt    • Heart disease Maternal Uncle    • Heart disease Paternal Aunt    • Heart disease Paternal Uncle    • Heart disease Maternal Grandmother    • Heart disease Maternal Grandfather    • Heart disease Paternal Grandmother    • Heart disease Paternal Grandfather        Social History     Social History   • Marital status:    • Number of children: 2   • Years of education: 12      Occupational History   • DISABLED      Social History Main Topics   • Smoking status: Current Every Day Smoker     Packs/day: 1.00     Years: 7.00     Types: Cigars, Cigarettes     Start date: 5/5/2010   • Smokeless tobacco: Never Used   • Alcohol use No   • Drug use: No   • Sexual activity: Defer     Other Topics Concern   • Not on file           Objective   Physical Exam   Constitutional: He is oriented to person, place, and time. He appears well-developed and well-nourished. No distress.   HENT:   Head: Normocephalic and atraumatic.   Nose: Nose normal.   Eyes: Conjunctivae are normal.   Neck: Normal range of motion. Neck supple. No JVD present. No tracheal deviation present.   Cardiovascular: Regular rhythm and normal heart sounds.    No murmur heard.  Tachycardic.    Pulmonary/Chest: Effort normal and breath sounds normal. No respiratory distress. He has no wheezes.   Abdominal: Soft. Bowel sounds are normal. There is no tenderness.   Musculoskeletal: Normal range of motion. He exhibits no edema or deformity.   Neurological: He is alert and oriented to person, place, and time. No cranial nerve deficit.   Skin: Skin is warm and dry. No rash noted. He is not diaphoretic. No erythema. No pallor.   Psychiatric: He has a normal mood and affect. His behavior is normal. Thought content normal.   Nursing note and vitals reviewed.      Procedures         ED Course  ED Course   Comment By Time   EKG interpreted by Dr. Leblanc: Sinus tachycardia otherwise normal EKG. GREG oDw 03/14 1918   Heart score calculated at 3 GREG Dow 03/14 2049   Chest x-ray interpreted by Dr. Leahy: No apparent acute cardiopulmonary disease. GREG Dow 03/14 2049   Outpatient stress test ordered with Dr. Mike to read GREG Dow 03/14 2133                  MDM  Number of Diagnoses or Management Options  Non-cardiac chest pain: new and requires workup     Amount and/or Complexity of Data Reviewed  Clinical lab  tests: reviewed  Tests in the radiology section of CPT®: reviewed  Discuss the patient with other providers: yes    Risk of Complications, Morbidity, and/or Mortality  Presenting problems: moderate  Diagnostic procedures: moderate  Management options: low    Patient Progress  Patient progress: stable      Final diagnoses:   Non-cardiac chest pain            GREG Dow  03/14/18 4771

## 2018-03-19 ENCOUNTER — HOSPITAL ENCOUNTER (OUTPATIENT)
Dept: CARDIOLOGY | Facility: HOSPITAL | Age: 48
Discharge: HOME | End: 2018-03-19

## 2018-03-19 DIAGNOSIS — I20.9 CARDIAC ANGINA (HCC): ICD-10-CM

## 2018-03-19 PROCEDURE — 93018 CV STRESS TEST I&R ONLY: CPT | Performed by: INTERNAL MEDICINE

## 2018-03-19 PROCEDURE — 93017 CV STRESS TEST TRACING ONLY: CPT

## 2018-03-20 LAB
BH CV STRESS BP STAGE 1: NORMAL
BH CV STRESS BP STAGE 2: NORMAL
BH CV STRESS BP STAGE 3: NORMAL
BH CV STRESS DURATION MIN STAGE 1: 3
BH CV STRESS DURATION MIN STAGE 2: 3
BH CV STRESS DURATION MIN STAGE 3: 3
BH CV STRESS DURATION SEC STAGE 1: 0
BH CV STRESS DURATION SEC STAGE 2: 0
BH CV STRESS DURATION SEC STAGE 3: 0
BH CV STRESS GRADE STAGE 1: 10
BH CV STRESS GRADE STAGE 2: 12
BH CV STRESS GRADE STAGE 3: 14
BH CV STRESS HR STAGE 1: 117
BH CV STRESS HR STAGE 2: 132
BH CV STRESS HR STAGE 3: 138
BH CV STRESS METS STAGE 1: 5
BH CV STRESS METS STAGE 2: 7.5
BH CV STRESS METS STAGE 3: 10
BH CV STRESS PROTOCOL 1: NORMAL
BH CV STRESS RECOVERY BP: NORMAL MMHG
BH CV STRESS RECOVERY HR: 96 BPM
BH CV STRESS SPEED STAGE 1: 1.7
BH CV STRESS SPEED STAGE 2: 2.5
BH CV STRESS SPEED STAGE 3: 3.4
BH CV STRESS STAGE 1: 1
BH CV STRESS STAGE 2: 2
BH CV STRESS STAGE 3: 3
MAXIMAL PREDICTED HEART RATE: 175 BPM
PERCENT MAX PREDICTED HR: 78.86 %
STRESS BASELINE BP: NORMAL MMHG
STRESS BASELINE HR: 97 BPM
STRESS PERCENT HR: 93 %
STRESS POST ESTIMATED WORKLOAD: 10.1 METS
STRESS POST EXERCISE DUR MIN: 7 MIN
STRESS POST EXERCISE DUR SEC: 15 SEC
STRESS POST PEAK BP: NORMAL MMHG
STRESS POST PEAK HR: 138 BPM
STRESS TARGET HR: 149 BPM

## 2018-03-21 ENCOUNTER — OFFICE VISIT (OUTPATIENT)
Dept: CARDIOLOGY | Facility: CLINIC | Age: 48
End: 2018-03-21

## 2018-03-21 VITALS
BODY MASS INDEX: 36.57 KG/M2 | RESPIRATION RATE: 16 BRPM | DIASTOLIC BLOOD PRESSURE: 86 MMHG | SYSTOLIC BLOOD PRESSURE: 135 MMHG | HEIGHT: 67 IN | WEIGHT: 233 LBS | HEART RATE: 85 BPM

## 2018-03-21 DIAGNOSIS — I10 ESSENTIAL HYPERTENSION: ICD-10-CM

## 2018-03-21 DIAGNOSIS — J45.20 MILD INTERMITTENT ASTHMA WITHOUT COMPLICATION: ICD-10-CM

## 2018-03-21 DIAGNOSIS — E78.5 DYSLIPIDEMIA: ICD-10-CM

## 2018-03-21 DIAGNOSIS — R07.2 PRECORDIAL PAIN: Primary | ICD-10-CM

## 2018-03-21 PROCEDURE — 99214 OFFICE O/P EST MOD 30 MIN: CPT | Performed by: PHYSICIAN ASSISTANT

## 2018-03-21 RX ORDER — AMLODIPINE BESYLATE 5 MG/1
5 TABLET ORAL DAILY
Qty: 30 TABLET | Refills: 3 | Status: SHIPPED | OUTPATIENT
Start: 2018-03-21 | End: 2018-04-20

## 2018-03-21 RX ORDER — ASPIRIN 81 MG/1
81 TABLET ORAL DAILY
Qty: 30 TABLET | Refills: 3 | Status: SHIPPED | OUTPATIENT
Start: 2018-03-21 | End: 2018-05-04 | Stop reason: ALTCHOICE

## 2018-03-21 NOTE — PROGRESS NOTES
Tiera Valenzuela, BALDOMERO  Jaquan Mckay  1970 03/21/2018    Patient Active Problem List   Diagnosis   • GERD (gastroesophageal reflux disease)   • Arthritis   • Hypertension   • Seizure disorder   • Hyperlipidemia   • Anxiety   • Varicocele   • Varicocele present on ultrasound of scrotum   • Obesity (BMI 30.0-34.9)   • Chronic bilateral low back pain with left-sided sciatica   • Seasonal allergic rhinitis due to pollen   • Mild intermittent asthma with acute exacerbation   • Precordial pain   • Dysuria   • Congenital coronary artery anomaly   • BMI 35.0-35.9,adult   • Chondromalacia, knee   • Achilles tendinitis of both lower extremities   • Muscle spasm of both lower legs     Dear BALDOMERO Thao:    Chief Complaint   Patient presents with   • Chest Pain     multiple er visits, stress test done 03/19/2018   • Palpitations     races   • Shortness of Breath     routine activity   • Other     meds, verbal     Subjective     Jaquan Mckay is a 47 y.o. male with a past medical history significant for hypertension and obesity. He presents to the office today for follow-up visit regarding recent chest pains.  He had a nuclear treadmill stress test which was unremarkable, however considered inconclusive due to inability to achieve target heart rate. He continues to have intermittent chest pain which comes and goes.  He had a spell last Wednesday for which he went to the emergency department for evaluation. He states he was walking through a store when he felt like something hit him in the back with pain in his back, neck, and radiation to the front of his chest.  He states he got dizzy and weak.  No near-syncope or actual syncopal episodes. EKG did not reveal any changes concerning for ischemia or infarction.  Troponin was negative x1. D-dimer was also within normal limits.       Current Outpatient Prescriptions:   •  albuterol (PROVENTIL HFA;VENTOLIN HFA) 108 (90 BASE) MCG/ACT inhaler, Inhale 2 puffs Every 4  (Four) Hours As Needed for Shortness of Air. (Patient taking differently: Inhale 2 puffs Every 4 (Four) Hours As Needed for Wheezing or Shortness of Air.), Disp: 1 inhaler, Rfl: 12  •  azelastine (ASTELIN) 0.1 % nasal spray, PLACE 1-2 SPRAYS EACH NOSTRIL TWICE DAILY AS NEEDED, Disp: 30 mL, Rfl: 5  •  budesonide-formoterol (SYMBICORT) 160-4.5 MCG/ACT inhaler, Inhale 2 puffs 2 (Two) Times a Day., Disp: 10.2 g, Rfl: 5  •  cetirizine (zyrTEC) 10 MG tablet, , Disp: , Rfl:   •  cyclobenzaprine (FLEXERIL) 10 MG tablet, Take 1 tablet by mouth 2 (Two) Times a Day As Needed for Muscle Spasms., Disp: 60 tablet, Rfl: 5  •  dicyclomine (BENTYL) 20 MG tablet, TAKE 1 TABLET BY MOUTH THREE TIMES DAILY FOR STOMACH, Disp: 90 tablet, Rfl: 5  •  DILANTIN 100 MG ER capsule, TAKE 2 CAPSULES BY MOUTH EVERY MORNING AND TAKE 3 CAPSULES EVERY EVENING, Disp: 150 capsule, Rfl: 5  •  EPINEPHrine (EPIPEN) 0.3 MG/0.3ML solution auto-injector injection, , Disp: , Rfl:   •  HYDROcodone-acetaminophen (NORCO) 7.5-325 MG per tablet, Take 1 tablet by mouth 2 (Two) Times a Day As Needed for Mild Pain  or Moderate Pain ., Disp: 60 tablet, Rfl: 0  •  ipratropium-albuterol (COMBIVENT RESPIMAT)  MCG/ACT inhaler, Inhale 1 puff 4 (Four) Times a Day As Needed for Wheezing., Disp: 4 g, Rfl: 11  •  lisinopril (PRINIVIL,ZESTRIL) 20 MG tablet, TAKE 1 TABLET BY MOUTH EVERY DAY FOR BLOOD PRESSURE, Disp: 30 tablet, Rfl: 5  •  loratadine (CLARITIN) 10 MG tablet, TAKE 1 TABLET BY MOUTH EVERY DAY FOR ALLERGIES, Disp: 30 tablet, Rfl: 5  •  montelukast (SINGULAIR) 10 MG tablet, TAKE 1 TABLET BY MOUTH AT BEDTIME FOR ALLERGIES/ASTHMA, Disp: 30 tablet, Rfl: 5  •  pantoprazole (PROTONIX) 40 MG EC tablet, TAKE 1 TABLET BY MOUTH EVERY DAY FOR STOMACH, Disp: 30 tablet, Rfl: 5  •  phentermine (ADIPEX-P) 37.5 MG tablet, Take 1 tablet by mouth Every Morning Before Breakfast., Disp: 30 tablet, Rfl: 0  •  potassium chloride (K-DUR) 10 MEQ CR tablet, Take 1 tablet by mouth  "Daily., Disp: 30 tablet, Rfl: 5  •  pravastatin (PRAVACHOL) 40 MG tablet, TAKE 1 TABLET BY MOUTH AT BEDTIME, Disp: 30 tablet, Rfl: 5  •  raNITIdine (ZANTAC) 300 MG tablet, TAKE 1 TABLET BY MOUTH TWICE DAILY, Disp: 60 tablet, Rfl: 5  •  amLODIPine (NORVASC) 5 MG tablet, Take 1 tablet by mouth Daily for 30 days., Disp: 30 tablet, Rfl: 3  •  aspirin 81 MG EC tablet, Take 1 tablet by mouth Daily., Disp: 30 tablet, Rfl: 3  •  celecoxib (CeleBREX) 200 MG capsule, , Disp: , Rfl: 2  •  meclizine (ANTIVERT) 25 MG tablet, Take 1 tablet by mouth Every 6 (Six) Hours As Needed for dizziness., Disp: 20 tablet, Rfl: 0    The following portions of the patient's history were reviewed and updated as appropriate: allergies, current medications, past family history, past medical history, past social history, past surgical history and problem list.    Social History     Social History   • Marital status:      Spouse name: N/A   • Number of children: 2   • Years of education: 12     Occupational History   • DISABLED      Social History Main Topics   • Smoking status: Current Every Day Smoker     Packs/day: 1.00     Years: 7.00     Types: Cigars, Cigarettes     Start date: 5/5/2010   • Smokeless tobacco: Never Used   • Alcohol use No   • Drug use: No   • Sexual activity: Defer     Other Topics Concern   • Not on file     Social History Narrative   • No narrative on file     Review of Systems   Cardiovascular: Positive for chest pain and palpitations.   Respiratory: Positive for shortness of breath.      Objective   Blood pressure 135/86, pulse 85, resp. rate 16, height 170.2 cm (67\"), weight 106 kg (233 lb).  Body mass index is 36.49 kg/m².    Physical Exam   Constitutional: He is oriented to person, place, and time. He appears well-developed and well-nourished. No distress.   HENT:   Head: Normocephalic and atraumatic.   Eyes: Conjunctivae are normal. Right eye exhibits no discharge. Left eye exhibits no discharge.   Neck: Normal " range of motion. Neck supple. Carotid bruit is not present.   Cardiovascular: Normal rate, regular rhythm and normal heart sounds.  Exam reveals no gallop and no friction rub.    No murmur heard.  Pulmonary/Chest: Effort normal and breath sounds normal. No respiratory distress. He has no wheezes. He has no rales. He exhibits no tenderness.   Musculoskeletal: Normal range of motion. He exhibits no edema.   Neurological: He is alert and oriented to person, place, and time.   Skin: Skin is warm and dry. No rash noted. He is not diaphoretic. No erythema. No pallor.   Psychiatric: He has a normal mood and affect. His behavior is normal.   Nursing note and vitals reviewed.    Procedures    Ref Range & Units 7d ago   D-Dimer, Quantitative 0.00 - 0.50 MCGFEU/mL <0.27    Comments: Note new Reference Range   Resulting Agency   [facility]   Narrative     d-Dimer assay result is to be used in conjunction with a non-high clinical pretest probability (PTP) assessment tool to exclude PE and aid in diagnosis of VTE with cutoff of 0.5 MCGFEU/mL.      Specimen Collected: 03/14/18 19:28 Last Resulted: 03/14/18 19:53        Left Heart Catheterization 08/29/06      Nuclear treadmill stress test 3/19/18  · A stress test was performed following the [facility] protocol.  · Exercise duration (min) 7 min Exercise duration (sec) 15 sec Estimated workload 10.1 METS  · Baseline Vitals Baseline HR 97 bpm Baseline /66 mmHg Peak Stress Vitals Peak  bpm Peak /98 mmHg Recovery Vitals Recovery HR 96 bpm Recovery /82 mmHg Exercise Data Target HR (85%) 149 bpm Max. Pred. HR (100%) 175 bpm Percent Max Pred HR 78.86 %  · Pt complained of chest discomfort in early recovery which resolved into recovery with no associated EKG changes.  · There was no ST segment deviation noted during stress.  · There were no significant arrhythmias noted during the test.  · No ECG evidence of myocardial ischemia.  · Findings consistent with a normal but  inconclusive ECG stress test as patient did not achieve the target HR.  · Comments:Consider dobutamine stress echo or pharmacological nuclear stress test if clinically warrented  Assessment:          Diagnosis Plan   1. Precordial pain  Adult Stress Echo W/ Cont or Stress Agent if Necessary Per Protocol   2. Mild intermittent asthma without complication     3. Essential hypertension  Adult Stress Echo W/ Cont or Stress Agent if Necessary Per Protocol   4. Dyslipidemia  Adult Stress Echo W/ Cont or Stress Agent if Necessary Per Protocol        Plan:       1. Restart aspirin 81mg QD.   2. Start amlodipine 5mg QD. (He has reported allergy to isosorbide and metoprolol)  3. He reportedly had a heart cath Gonzalez last year. Will request this report.   4. Since he was unable to achieve target heart rate on the stress test, it is considered inconclusive.  Since he continues to have chest pains, will check a dobutamine stress echocardiogram to rule out occult ischemia.  5. We will follow-up in 5-6 weeks or sooner if needed.    No Follow-up on file.    I appreciate the opportunity to participate in this patient's cardiovascular care.    Best Regards,    Sintia Landry PA-C

## 2018-04-03 ENCOUNTER — OFFICE VISIT (OUTPATIENT)
Dept: FAMILY MEDICINE CLINIC | Facility: CLINIC | Age: 48
End: 2018-04-03

## 2018-04-03 VITALS
DIASTOLIC BLOOD PRESSURE: 70 MMHG | WEIGHT: 226 LBS | SYSTOLIC BLOOD PRESSURE: 122 MMHG | BODY MASS INDEX: 35.47 KG/M2 | OXYGEN SATURATION: 96 % | TEMPERATURE: 98.1 F | HEIGHT: 67 IN | HEART RATE: 108 BPM

## 2018-04-03 DIAGNOSIS — G89.29 CHRONIC BILATERAL LOW BACK PAIN WITH LEFT-SIDED SCIATICA: ICD-10-CM

## 2018-04-03 DIAGNOSIS — R31.9 HEMATURIA, UNSPECIFIED TYPE: Primary | ICD-10-CM

## 2018-04-03 DIAGNOSIS — K21.9 GASTROESOPHAGEAL REFLUX DISEASE WITHOUT ESOPHAGITIS: ICD-10-CM

## 2018-04-03 DIAGNOSIS — M54.42 CHRONIC BILATERAL LOW BACK PAIN WITH LEFT-SIDED SCIATICA: ICD-10-CM

## 2018-04-03 PROCEDURE — 99214 OFFICE O/P EST MOD 30 MIN: CPT | Performed by: NURSE PRACTITIONER

## 2018-04-03 RX ORDER — HYDROCODONE BITARTRATE AND ACETAMINOPHEN 7.5; 325 MG/1; MG/1
1 TABLET ORAL 2 TIMES DAILY PRN
Qty: 60 TABLET | Refills: 0 | Status: SHIPPED | OUTPATIENT
Start: 2018-04-03 | End: 2018-05-02 | Stop reason: SDUPTHER

## 2018-04-03 NOTE — PROGRESS NOTES
"Subjective   Jaquan Mckay is a 47 y.o. male.     Chief Complaint   Patient presents with   • Knee Pain       History of Present Illness     Knee Pain-chronic.  Has been to see Dr Dubin for second opinion.  He reports continued numbness to his knee.  He reports tenderness and pain in his knee.  He does follow up tomorrow with Dr Dubin.  He reports difficulty with ROM at times.  Does not feel \"surgery helped any\"  Ongoing.    Fall-reports a recent \"bad\" fall.  He reports he hit something into his back and flipped forward onto his head and back.  Reports he hit his right elbow.   He reports an ED visit with diagnosis of concussion with contusions.  He has been sore in general but that is gradually improving.  He reports dark discolored urine and \"passing blood\".  He reports he is also have some burning with urination.  Ongoing.    Cardiac-recent visit to cardio.  Reports an upcoming testing.  Has been started on Norvasc.    GERD-increase in symptoms.  Reports he has been taking TUMs in addition to his normal meds.  He does not wish to see gastro for a scope at this time.  History of ulcerations.  He does eat spicy foods at times.  Ongoing.      The following portions of the patient's history were reviewed and updated as appropriate: allergies, current medications, past family history, past medical history, past social history, past surgical history and problem list.    Review of Systems   Constitutional: Positive for fatigue. Negative for appetite change and fever.   HENT: Negative for congestion, ear pain, postnasal drip, rhinorrhea, sinus pressure, sore throat and tinnitus.    Eyes: Negative for pain and visual disturbance (wears glasses.  recent eye exam with new glasses).   Respiratory: Negative for cough, chest tightness, shortness of breath and wheezing.    Cardiovascular: Negative for chest pain and palpitations.   Gastrointestinal: Negative for abdominal pain, blood in stool, constipation, diarrhea and " "vomiting.   Genitourinary: Positive for dysuria (chronic), hematuria and testicular pain (chronic). Negative for urgency.   Musculoskeletal: Positive for arthralgias, back pain (with radiation to BLE \"down to ankles\" ) and myalgias.   Skin: Negative for color change and rash.   Neurological: Positive for weakness (BLE), numbness (and tingling with \"electricity\" sensation in his legs) and headaches. Negative for dizziness.   Psychiatric/Behavioral: Positive for sleep disturbance. Negative for dysphoric mood and suicidal ideas. The patient is not nervous/anxious.    All other systems reviewed and are negative.      Objective     /70   Pulse 108   Temp 98.1 °F (36.7 °C) (Temporal Artery )   Ht 170.2 cm (67\")   Wt 103 kg (226 lb)   SpO2 96%   BMI 35.40 kg/m²     Physical Exam   Constitutional: He is oriented to person, place, and time. He appears well-developed and well-nourished.   HENT:   Head: Normocephalic and atraumatic.   Right Ear: External ear and ear canal normal. Tympanic membrane is not erythematous.   Left Ear: External ear and ear canal normal. Tympanic membrane is not erythematous.   Nose: Mucosal edema and rhinorrhea present.   Mouth/Throat: No oropharyngeal exudate or posterior oropharyngeal erythema.   Eyes: Conjunctivae and EOM are normal. Pupils are equal, round, and reactive to light. No scleral icterus.   Neck: Normal range of motion. Neck supple. No JVD present. No thyromegaly present.   Cardiovascular: Normal rate, regular rhythm and normal heart sounds.    No murmur heard.  Pulmonary/Chest: Effort normal. No respiratory distress. He has no decreased breath sounds. He has no wheezes. He exhibits no tenderness.   Abdominal: Soft. Bowel sounds are normal. He exhibits no mass. There is no tenderness.   Musculoskeletal: He exhibits no edema.        Right elbow: Tenderness found. Medial epicondyle and olecranon process tenderness noted.        Left knee: He exhibits decreased range of " motion and swelling (mild). He exhibits no ecchymosis. Tenderness found. Medial joint line and lateral joint line tenderness noted.        Lumbar back: He exhibits decreased range of motion, tenderness, pain and spasm. He exhibits no swelling.        Right hand: He exhibits tenderness (3rd digit). He exhibits normal capillary refill.   Gait shuffling with limping noted.  More prominent on right side from behind     Skin Integrity  -  His right foot skin is intact.His left foot skin is intact..  Lymphadenopathy:        Head (right side): No submandibular adenopathy present.        Head (left side): No submandibular adenopathy present.     He has no cervical adenopathy.   Neurological: He is alert and oriented to person, place, and time. He has normal reflexes. No cranial nerve deficit. He exhibits normal muscle tone. Coordination normal.   Skin: Skin is warm and dry. Capillary refill takes less than 2 seconds.   Psychiatric: His speech is normal and behavior is normal. Judgment and thought content normal. His mood appears not anxious. He is not actively hallucinating. Cognition and memory are normal. He does not exhibit a depressed mood. He expresses no homicidal and no suicidal ideation. He exhibits normal recent memory and normal remote memory. He is attentive.   Vitals reviewed.    Assessment/Plan     Problem List Items Addressed This Visit        Digestive    GERD (gastroesophageal reflux disease)    Current Assessment & Plan     Continue meds as directed. Advised to avoid known GI triggers such as spicy foods.  Upright 30 minutes after meals and avoid eating large meals.  Several small meals daily as able to avoid overfilling stomach.              Nervous and Auditory    Chronic bilateral low back pain with left-sided sciatica    Relevant Medications    HYDROcodone-acetaminophen (NORCO) 7.5-325 MG per tablet      Other Visit Diagnoses     Hematuria, unspecified type    -  Primary    Relevant Orders    US Renal  Bilateral        CHAVEZ reviewed today and consistent.  Will refill prescribed controlled medication today.  Patient is aware they cannot receive narcotics from any other provider except if under care of pain management or speciality clinic.  Risk and benefits of medication use has been reviewed.  History and physical exam exhibit continued safe and appropriate use of controlled substances.  Understands disease processes and need for medications.  Understands reasons for urgent and emergent care.  Patient (& family) verbalized agreement for treatment plan.   Continue under care of Dr Dubin for knee complaint.   Emotional support and active listening provided.  Patient provided time to verbalize feelings.  Advised to avoid known GI triggers such as spicy foods.  Upright 30 minutes after meals and avoid eating large meals.  Several small meals daily as able to avoid overfilling stomach.    RTC 1 month, sooner if needed.           This document has been electronically signed by:  BALDOMERO Thao, FNP-C

## 2018-04-03 NOTE — ASSESSMENT & PLAN NOTE
Continue meds as directed. Advised to avoid known GI triggers such as spicy foods.  Upright 30 minutes after meals and avoid eating large meals.  Several small meals daily as able to avoid overfilling stomach.

## 2018-04-04 ENCOUNTER — TELEPHONE (OUTPATIENT)
Dept: CARDIOLOGY | Facility: CLINIC | Age: 48
End: 2018-04-04

## 2018-04-04 NOTE — TELEPHONE ENCOUNTER
Called pharmacy back and spoke with Jeremie I advised him that when he was here that Sintia was restarting him on ASA 81 mg QD and starting him on Amlodipine. He stated they received the Amlodipine but not the ASA. I called in the ASA 81 mg QD for Jaquan.

## 2018-04-17 ENCOUNTER — HOSPITAL ENCOUNTER (OUTPATIENT)
Dept: CARDIOLOGY | Facility: HOSPITAL | Age: 48
Discharge: HOME | End: 2018-04-17
Admitting: PHYSICIAN ASSISTANT

## 2018-04-17 ENCOUNTER — HOSPITAL ENCOUNTER (OUTPATIENT)
Dept: ULTRASOUND IMAGING | Facility: HOSPITAL | Age: 48
Discharge: HOME | End: 2018-04-17
Admitting: NURSE PRACTITIONER

## 2018-04-17 DIAGNOSIS — R31.9 HEMATURIA, UNSPECIFIED TYPE: ICD-10-CM

## 2018-04-17 DIAGNOSIS — R07.2 PRECORDIAL PAIN: ICD-10-CM

## 2018-04-17 DIAGNOSIS — I10 ESSENTIAL HYPERTENSION: ICD-10-CM

## 2018-04-17 DIAGNOSIS — E78.5 DYSLIPIDEMIA: ICD-10-CM

## 2018-04-17 PROCEDURE — 93320 DOPPLER ECHO COMPLETE: CPT | Performed by: INTERNAL MEDICINE

## 2018-04-17 PROCEDURE — 93325 DOPPLER ECHO COLOR FLOW MAPG: CPT

## 2018-04-17 PROCEDURE — 93350 STRESS TTE ONLY: CPT | Performed by: INTERNAL MEDICINE

## 2018-04-17 PROCEDURE — 93351 STRESS TTE COMPLETE: CPT

## 2018-04-17 PROCEDURE — 93018 CV STRESS TEST I&R ONLY: CPT | Performed by: INTERNAL MEDICINE

## 2018-04-17 PROCEDURE — 25010000002 DOBUTAMINE PER 250 MG: Performed by: PHYSICIAN ASSISTANT

## 2018-04-17 PROCEDURE — 93325 DOPPLER ECHO COLOR FLOW MAPG: CPT | Performed by: INTERNAL MEDICINE

## 2018-04-17 PROCEDURE — 76775 US EXAM ABDO BACK WALL LIM: CPT

## 2018-04-17 PROCEDURE — 76775 US EXAM ABDO BACK WALL LIM: CPT | Performed by: RADIOLOGY

## 2018-04-17 PROCEDURE — 93320 DOPPLER ECHO COMPLETE: CPT

## 2018-04-17 RX ORDER — ATROPINE SULFATE 1 MG/ML
1 INJECTION, SOLUTION INTRAMUSCULAR; INTRAVENOUS; SUBCUTANEOUS ONCE
Status: COMPLETED | OUTPATIENT
Start: 2018-04-17 | End: 2018-04-17

## 2018-04-17 RX ORDER — DOBUTAMINE HYDROCHLORIDE 200 MG/100ML
10 INJECTION INTRAVENOUS
Status: DISCONTINUED | OUTPATIENT
Start: 2018-04-17 | End: 2018-04-18 | Stop reason: HOSPADM

## 2018-04-17 RX ORDER — ESMOLOL HYDROCHLORIDE 10 MG/ML
50 INJECTION INTRAVENOUS ONCE
Status: COMPLETED | OUTPATIENT
Start: 2018-04-17 | End: 2018-04-17

## 2018-04-17 RX ADMIN — ATROPINE SULFATE 1 MG: 1 INJECTION, SOLUTION INTRAMUSCULAR; INTRAVENOUS; SUBCUTANEOUS at 13:59

## 2018-04-17 RX ADMIN — ESMOLOL HYDROCHLORIDE 50 MG: 10 INJECTION, SOLUTION INTRAVENOUS at 14:02

## 2018-04-22 LAB
BH CV ECHO MEAS - % IVS THICK: 36.1 %
BH CV ECHO MEAS - % LVPW THICK: 63.1 %
BH CV ECHO MEAS - ACS: 1.9 CM
BH CV ECHO MEAS - AO ROOT AREA (BSA CORRECTED): 1.6
BH CV ECHO MEAS - AO ROOT AREA: 8.7 CM^2
BH CV ECHO MEAS - AO ROOT DIAM: 3.3 CM
BH CV ECHO MEAS - BSA(HAYCOCK): 2.2 M^2
BH CV ECHO MEAS - BSA: 2.1 M^2
BH CV ECHO MEAS - BZI_BMI: 35.1 KILOGRAMS/M^2
BH CV ECHO MEAS - BZI_METRIC_HEIGHT: 170.2 CM
BH CV ECHO MEAS - BZI_METRIC_WEIGHT: 101.6 KG
BH CV ECHO MEAS - CONTRAST EF 4CH: 66.7 ML/M^2
BH CV ECHO MEAS - EDV(CUBED): 105.3 ML
BH CV ECHO MEAS - EDV(MOD-SP4): 105 ML
BH CV ECHO MEAS - EDV(TEICH): 103.5 ML
BH CV ECHO MEAS - EF(CUBED): 60.8 %
BH CV ECHO MEAS - EF(MOD-SP4): 66.7 %
BH CV ECHO MEAS - EF(TEICH): 52.4 %
BH CV ECHO MEAS - ESV(CUBED): 41.2 ML
BH CV ECHO MEAS - ESV(MOD-SP4): 35 ML
BH CV ECHO MEAS - ESV(TEICH): 49.3 ML
BH CV ECHO MEAS - FS: 26.8 %
BH CV ECHO MEAS - IVS/LVPW: 1.1
BH CV ECHO MEAS - IVSD: 1.2 CM
BH CV ECHO MEAS - IVSS: 1.7 CM
BH CV ECHO MEAS - LA DIMENSION: 3.4 CM
BH CV ECHO MEAS - LA/AO: 1
BH CV ECHO MEAS - LV DIASTOLIC VOL/BSA (35-75): 49.5 ML/M^2
BH CV ECHO MEAS - LV MASS(C)D: 203.3 GRAMS
BH CV ECHO MEAS - LV MASS(C)DI: 95.8 GRAMS/M^2
BH CV ECHO MEAS - LV MASS(C)S: 239.7 GRAMS
BH CV ECHO MEAS - LV MASS(C)SI: 113 GRAMS/M^2
BH CV ECHO MEAS - LV SYSTOLIC VOL/BSA (12-30): 16.5 ML/M^2
BH CV ECHO MEAS - LVIDD: 4.7 CM
BH CV ECHO MEAS - LVIDS: 3.5 CM
BH CV ECHO MEAS - LVLD AP4: 7.8 CM
BH CV ECHO MEAS - LVLS AP4: 7.4 CM
BH CV ECHO MEAS - LVOT AREA (M): 3.1 CM^2
BH CV ECHO MEAS - LVOT AREA: 3.3 CM^2
BH CV ECHO MEAS - LVOT DIAM: 2 CM
BH CV ECHO MEAS - LVPWD: 1.1 CM
BH CV ECHO MEAS - LVPWS: 1.8 CM
BH CV ECHO MEAS - MV A MAX VEL: 60.7 CM/SEC
BH CV ECHO MEAS - MV E MAX VEL: 84.4 CM/SEC
BH CV ECHO MEAS - MV E/A: 1.4
BH CV ECHO MEAS - PA ACC SLOPE: 1687 CM/SEC^2
BH CV ECHO MEAS - PA ACC TIME: 0.08 SEC
BH CV ECHO MEAS - PA PR(ACCEL): 41 MMHG
BH CV ECHO MEAS - RVDD: 1.6 CM
BH CV ECHO MEAS - SI(CUBED): 30.2 ML/M^2
BH CV ECHO MEAS - SI(MOD-SP4): 33 ML/M^2
BH CV ECHO MEAS - SI(TEICH): 25.5 ML/M^2
BH CV ECHO MEAS - SV(CUBED): 64.1 ML
BH CV ECHO MEAS - SV(MOD-SP4): 70 ML
BH CV ECHO MEAS - SV(TEICH): 54.2 ML
BH CV STRESS BP STAGE 1: NORMAL
BH CV STRESS BP STAGE 2: NORMAL
BH CV STRESS BP STAGE 3: NORMAL
BH CV STRESS BP STAGE 4: NORMAL
BH CV STRESS DOSE DOBUTAMINE STAGE 1: 10
BH CV STRESS DOSE DOBUTAMINE STAGE 2: 20
BH CV STRESS DOSE DOBUTAMINE STAGE 3: 30
BH CV STRESS DOSE DOBUTAMINE STAGE 4: 40
BH CV STRESS DURATION MIN STAGE 1: 2
BH CV STRESS DURATION MIN STAGE 2: 2
BH CV STRESS DURATION MIN STAGE 3: 2
BH CV STRESS DURATION MIN STAGE 4: 2
BH CV STRESS DURATION SEC STAGE 1: 0
BH CV STRESS DURATION SEC STAGE 2: 0
BH CV STRESS DURATION SEC STAGE 3: 0
BH CV STRESS DURATION SEC STAGE 4: 0
BH CV STRESS ECHO POST STRESS EJECTION FRACTION EF: 80 %
BH CV STRESS HR STAGE 1: 88
BH CV STRESS HR STAGE 2: 106
BH CV STRESS HR STAGE 3: 117
BH CV STRESS HR STAGE 4: 149
BH CV STRESS PROTOCOL 1: NORMAL
BH CV STRESS RECOVERY BP: NORMAL MMHG
BH CV STRESS RECOVERY HR: 106 BPM
BH CV STRESS STAGE 1: 1
BH CV STRESS STAGE 2: 2
BH CV STRESS STAGE 3: 3
BH CV STRESS STAGE 4: 4
MAXIMAL PREDICTED HEART RATE: 175 BPM
PERCENT MAX PREDICTED HR: 85.14 %
STRESS BASELINE BP: NORMAL MMHG
STRESS BASELINE HR: 93 BPM
STRESS PERCENT HR: 100 %
STRESS POST PEAK BP: NORMAL MMHG
STRESS POST PEAK HR: 149 BPM
STRESS TARGET HR: 149 BPM

## 2018-05-02 ENCOUNTER — OFFICE VISIT (OUTPATIENT)
Dept: FAMILY MEDICINE CLINIC | Facility: CLINIC | Age: 48
End: 2018-05-02

## 2018-05-02 VITALS
DIASTOLIC BLOOD PRESSURE: 82 MMHG | HEIGHT: 67 IN | SYSTOLIC BLOOD PRESSURE: 117 MMHG | OXYGEN SATURATION: 98 % | TEMPERATURE: 97.4 F | WEIGHT: 223 LBS | HEART RATE: 109 BPM | BODY MASS INDEX: 35 KG/M2

## 2018-05-02 DIAGNOSIS — I83.90 VARICOSE VEIN OF LEG: Primary | ICD-10-CM

## 2018-05-02 DIAGNOSIS — Z91.013 PERSONAL HISTORY OF ALLERGY TO SHELLFISH: ICD-10-CM

## 2018-05-02 DIAGNOSIS — R20.9 SENSATION OF COLD IN LOWER EXTREMITY: ICD-10-CM

## 2018-05-02 DIAGNOSIS — J06.9 ACUTE URI: ICD-10-CM

## 2018-05-02 DIAGNOSIS — M54.42 CHRONIC BILATERAL LOW BACK PAIN WITH LEFT-SIDED SCIATICA: ICD-10-CM

## 2018-05-02 DIAGNOSIS — G89.29 CHRONIC BILATERAL LOW BACK PAIN WITH LEFT-SIDED SCIATICA: ICD-10-CM

## 2018-05-02 PROCEDURE — 99214 OFFICE O/P EST MOD 30 MIN: CPT | Performed by: NURSE PRACTITIONER

## 2018-05-02 RX ORDER — ONDANSETRON 4 MG/1
TABLET, ORALLY DISINTEGRATING ORAL
Status: ON HOLD | COMMUNITY
Start: 2018-04-10 | End: 2018-09-25

## 2018-05-02 RX ORDER — PHENTERMINE HYDROCHLORIDE 37.5 MG/1
37.5 TABLET ORAL
Qty: 30 TABLET | Refills: 0 | Status: SHIPPED | OUTPATIENT
Start: 2018-05-02 | End: 2018-06-01 | Stop reason: SDUPTHER

## 2018-05-02 RX ORDER — DOXYCYCLINE 100 MG/1
100 CAPSULE ORAL EVERY 12 HOURS SCHEDULED
Qty: 20 CAPSULE | Refills: 0 | Status: SHIPPED | OUTPATIENT
Start: 2018-05-02 | End: 2018-05-04 | Stop reason: ALTCHOICE

## 2018-05-02 RX ORDER — HYDROCODONE BITARTRATE AND ACETAMINOPHEN 7.5; 325 MG/1; MG/1
1 TABLET ORAL 2 TIMES DAILY PRN
Qty: 60 TABLET | Refills: 0 | Status: SHIPPED | OUTPATIENT
Start: 2018-05-02 | End: 2018-06-01 | Stop reason: SDUPTHER

## 2018-05-02 RX ORDER — AMLODIPINE BESYLATE 5 MG/1
TABLET ORAL
Refills: 3 | COMMUNITY
Start: 2018-04-27 | End: 2018-06-26

## 2018-05-02 RX ORDER — EPINEPHRINE 0.3 MG/.3ML
0.3 INJECTION SUBCUTANEOUS ONCE
Qty: 1 EACH | Refills: 1 | Status: SHIPPED | OUTPATIENT
Start: 2018-05-02 | End: 2018-05-02

## 2018-05-02 NOTE — PROGRESS NOTES
"Subjective   Jaquan Mckay is a 47 y.o. male.     Chief Complaint   Patient presents with   • Follow-up   • Asthma   • Knee Pain       History of Present Illness     Cough-acute.  Reports it has been occurring when he \"takes allergy shots\" at the allergy office.  He reports he is taking Allegra and Singulair.  Some sore throat.  He reports some SOA.  His injections are suppose to be 2 x's per week.  He reports some sinus pressure today.  Ongoing.    Knee Pain-under care of Dr Dubin.  Reports a MRI of left knee with increase in arthritis symptoms.  He does have a follow up tomorrow and is planning to have Synvis if his insurance will cover the injection.  Jaquan reports Dr Dubin does not feel surgery would be useful at this time.  Ongoing.    Renal ultrasound-had done on 4/17.  No acute findings.  No concerns at present.  Stable.    Extremity complaint-reports sensation of cold feet regardless of temp.  He reports he can wear 2 pair of socks and still feel cold.  He reports he does not get hair much on the lower portion of his leg at ankle area.  He has shaved legs presently.  Toe nails are brittle and difficult to cut.  Ongoing.      The following portions of the patient's history were reviewed and updated as appropriate: allergies, current medications, past family history, past medical history, past social history, past surgical history and problem list.    Review of Systems   Constitutional: Negative for appetite change, fatigue and unexpected weight change.   HENT: Positive for congestion, ear pain, sinus pain and sinus pressure. Negative for nosebleeds, postnasal drip, rhinorrhea, sore throat, trouble swallowing and voice change.    Eyes: Positive for itching. Negative for pain and visual disturbance.   Respiratory: Positive for cough and shortness of breath. Negative for wheezing.    Cardiovascular: Negative for chest pain and palpitations.   Gastrointestinal: Positive for abdominal pain and blood in stool. " "Negative for constipation and diarrhea.   Endocrine: Negative for cold intolerance and polydipsia.   Genitourinary: Negative for difficulty urinating, flank pain and hematuria.   Musculoskeletal: Positive for arthralgias and back pain. Negative for gait problem, joint swelling and myalgias.   Skin: Negative for color change and rash.   Allergic/Immunologic: Negative.    Neurological: Positive for headaches. Negative for syncope and numbness.   Hematological: Negative.    Psychiatric/Behavioral: Negative for sleep disturbance and suicidal ideas.   All other systems reviewed and are negative.      Objective     /82   Pulse 109   Temp 97.4 °F (36.3 °C) (Temporal Artery )   Ht 170.2 cm (67\")   Wt 101 kg (223 lb)   SpO2 98%   BMI 34.93 kg/m²     Physical Exam   Constitutional: He is oriented to person, place, and time. He appears well-developed and well-nourished.   HENT:   Head: Normocephalic and atraumatic.   Right Ear: External ear and ear canal normal. Tympanic membrane is not erythematous.   Left Ear: External ear and ear canal normal. Tympanic membrane is not erythematous.   Nose: Mucosal edema and rhinorrhea present.   Mouth/Throat: No oropharyngeal exudate or posterior oropharyngeal erythema.   Eyes: Conjunctivae and EOM are normal. Pupils are equal, round, and reactive to light. No scleral icterus.   Neck: Normal range of motion. Neck supple. No JVD present. No thyromegaly present.   Cardiovascular: Normal rate, regular rhythm and normal heart sounds.    No murmur heard.  Pulmonary/Chest: Effort normal. No respiratory distress. He has no decreased breath sounds. He has no wheezes. He exhibits no tenderness.   Abdominal: Soft. Bowel sounds are normal. He exhibits no mass. There is no tenderness.   Musculoskeletal: He exhibits tenderness. He exhibits no edema.        Right elbow: Tenderness found. Medial epicondyle and olecranon process tenderness noted.        Left knee: He exhibits decreased range of " motion and swelling (mild). He exhibits no ecchymosis. Tenderness found. Medial joint line and lateral joint line tenderness noted.        Lumbar back: He exhibits decreased range of motion, tenderness, pain and spasm. He exhibits no swelling.        Right hand: He exhibits tenderness (3rd digit). He exhibits normal capillary refill.     Skin Integrity  -  His right foot skin is intact.His left foot skin is intact..  Lymphadenopathy:        Head (right side): No submandibular adenopathy present.        Head (left side): No submandibular adenopathy present.     He has no cervical adenopathy.   Neurological: He is alert and oriented to person, place, and time. He has normal reflexes. No cranial nerve deficit. He exhibits normal muscle tone. Coordination normal.   Skin: Skin is warm and dry. Capillary refill takes less than 2 seconds.   Psychiatric: His speech is normal and behavior is normal. Judgment and thought content normal. His mood appears not anxious. He is not actively hallucinating. Cognition and memory are normal. He does not exhibit a depressed mood. He expresses no homicidal and no suicidal ideation. He exhibits normal recent memory and normal remote memory. He is attentive.   Vitals reviewed.    Assessment/Plan     Problem List Items Addressed This Visit        Cardiovascular and Mediastinum    Varicose vein of leg - Primary    Relevant Orders    Ambulatory Referral to Vascular Surgery (Completed)       Nervous and Auditory    Chronic bilateral low back pain with left-sided sciatica    Relevant Medications    HYDROcodone-acetaminophen (NORCO) 7.5-325 MG per tablet       Other    BMI 35.0-35.9,adult    Relevant Medications    phentermine (ADIPEX-P) 37.5 MG tablet    Personal history of allergy to shellfish    Sensation of cold in lower extremity    Relevant Orders    Ambulatory Referral to Vascular Surgery (Completed)      Other Visit Diagnoses     Acute URI        Relevant Medications    doxycycline  (MONODOX) 100 MG capsule      CHAVEZ reviewed today and consistent.  Will refill prescribed controlled medication today.  Patient is aware they cannot receive narcotics from any other provider except if under care of pain management or speciality clinic.  Risk and benefits of medication use has been reviewed.  History and physical exam exhibit continued safe and appropriate use of controlled substances.  Last UDS consistent  Instructed to complete all of antibiotics for acute illness.  Increase PO fluids, avoid/limit caffeine.  Do not save any of the meds for later use.  Rest PRN  Continue weight loss regimen.  Walking as able.   Continue under care of Dr Dubin  Advised to report to allergy specialist that he is developing cough after each injection.  RTC 1 month, sooner if needed.         This document has been electronically signed by:  BALDOMERO Thao, NAOMI

## 2018-05-03 PROBLEM — R20.9 SENSATION OF COLD IN LOWER EXTREMITY: Status: ACTIVE | Noted: 2018-05-03

## 2018-05-03 PROBLEM — I83.90 VARICOSE VEIN OF LEG: Status: ACTIVE | Noted: 2018-05-03

## 2018-05-03 PROBLEM — Z91.013 PERSONAL HISTORY OF ALLERGY TO SHELLFISH: Status: ACTIVE | Noted: 2018-05-03

## 2018-05-04 ENCOUNTER — OFFICE VISIT (OUTPATIENT)
Dept: CARDIOLOGY | Facility: CLINIC | Age: 48
End: 2018-05-04

## 2018-05-04 VITALS
RESPIRATION RATE: 16 BRPM | HEIGHT: 67 IN | HEART RATE: 85 BPM | WEIGHT: 224.6 LBS | DIASTOLIC BLOOD PRESSURE: 81 MMHG | SYSTOLIC BLOOD PRESSURE: 145 MMHG | BODY MASS INDEX: 35.25 KG/M2

## 2018-05-04 DIAGNOSIS — R00.2 HEART PALPITATIONS: ICD-10-CM

## 2018-05-04 DIAGNOSIS — I10 ESSENTIAL HYPERTENSION: ICD-10-CM

## 2018-05-04 DIAGNOSIS — E78.2 MIXED HYPERLIPIDEMIA: ICD-10-CM

## 2018-05-04 DIAGNOSIS — R07.2 PRECORDIAL PAIN: Primary | ICD-10-CM

## 2018-05-04 PROCEDURE — 99214 OFFICE O/P EST MOD 30 MIN: CPT | Performed by: INTERNAL MEDICINE

## 2018-05-04 RX ORDER — SPIRONOLACTONE 25 MG/1
25 TABLET ORAL DAILY
Qty: 30 TABLET | Refills: 11 | Status: SHIPPED | OUTPATIENT
Start: 2018-05-04 | End: 2018-07-03

## 2018-05-04 NOTE — PROGRESS NOTES
BALDOMERO Thao  Jaquan Mckay  1970 05/04/2018    Patient Active Problem List   Diagnosis   • GERD (gastroesophageal reflux disease)   • Arthritis   • Hypertension   • Seizure disorder   • Hyperlipidemia   • Anxiety   • Varicocele   • Varicocele present on ultrasound of scrotum   • Obesity (BMI 30.0-34.9)   • Chronic bilateral low back pain with left-sided sciatica   • Seasonal allergic rhinitis due to pollen   • Mild intermittent asthma with acute exacerbation   • Precordial pain   • Dysuria   • Congenital coronary artery anomaly   • BMI 35.0-35.9,adult   • Chondromalacia, knee   • Achilles tendinitis of both lower extremities   • Muscle spasm of both lower legs   • Personal history of allergy to shellfish   • Sensation of cold in lower extremity   • Varicose vein of leg   • Heart palpitations       Dear BALDOMERO Thao:    Subjective     Jaquan Mckay is a 47 y.o. male with the above medical problems who is here today for follow-up. According to the patient has been doing fairly well since his last visit.  Since he has not had any further episodes of chest pain and underwent a stress test which showed no evidence of ischemia and echocardiogram showed a normal ejection fraction and wall motion abnormalities.  He does complain of occasional shortness of breath but also continues to smoke.  He is now complaining of intermittent palpitations that occur approximately once per week and last for a few minutes.  He denies any exacerbating or ameliorating factors.  He denies any associated symptoms.  He states the intensity is moderate.  He also complains of bilateral lower extremity swelling is mild.      Current Outpatient Prescriptions:   •  albuterol (PROVENTIL HFA;VENTOLIN HFA) 108 (90 BASE) MCG/ACT inhaler, Inhale 2 puffs Every 4 (Four) Hours As Needed for Shortness of Air. (Patient taking differently: Inhale 2 puffs Every 4 (Four) Hours As Needed for Wheezing or Shortness of Air.), Disp: 1 inhaler,  Rfl: 12  •  amLODIPine (NORVASC) 5 MG tablet, , Disp: , Rfl: 3  •  budesonide-formoterol (SYMBICORT) 160-4.5 MCG/ACT inhaler, Inhale 2 puffs 2 (Two) Times a Day., Disp: 10.2 g, Rfl: 5  •  cetirizine (zyrTEC) 10 MG tablet, , Disp: , Rfl:   •  cyclobenzaprine (FLEXERIL) 10 MG tablet, Take 1 tablet by mouth 2 (Two) Times a Day As Needed for Muscle Spasms., Disp: 60 tablet, Rfl: 5  •  DILANTIN 100 MG ER capsule, TAKE 2 CAPSULES BY MOUTH EVERY MORNING AND TAKE 3 CAPSULES EVERY EVENING, Disp: 150 capsule, Rfl: 5  •  HYDROcodone-acetaminophen (NORCO) 7.5-325 MG per tablet, Take 1 tablet by mouth 2 (Two) Times a Day As Needed for Mild Pain  or Moderate Pain ., Disp: 60 tablet, Rfl: 0  •  ipratropium-albuterol (COMBIVENT RESPIMAT)  MCG/ACT inhaler, Inhale 1 puff 4 (Four) Times a Day As Needed for Wheezing., Disp: 4 g, Rfl: 11  •  lisinopril (PRINIVIL,ZESTRIL) 20 MG tablet, TAKE 1 TABLET BY MOUTH EVERY DAY FOR BLOOD PRESSURE, Disp: 30 tablet, Rfl: 5  •  loratadine (CLARITIN) 10 MG tablet, TAKE 1 TABLET BY MOUTH EVERY DAY FOR ALLERGIES, Disp: 30 tablet, Rfl: 5  •  montelukast (SINGULAIR) 10 MG tablet, TAKE 1 TABLET BY MOUTH AT BEDTIME FOR ALLERGIES/ASTHMA, Disp: 30 tablet, Rfl: 5  •  ondansetron ODT (ZOFRAN-ODT) 4 MG disintegrating tablet, , Disp: , Rfl:   •  pantoprazole (PROTONIX) 40 MG EC tablet, TAKE 1 TABLET BY MOUTH EVERY DAY FOR STOMACH, Disp: 30 tablet, Rfl: 5  •  phentermine (ADIPEX-P) 37.5 MG tablet, Take 1 tablet by mouth Every Morning Before Breakfast., Disp: 30 tablet, Rfl: 0  •  pravastatin (PRAVACHOL) 40 MG tablet, TAKE 1 TABLET BY MOUTH AT BEDTIME, Disp: 30 tablet, Rfl: 5  •  raNITIdine (ZANTAC) 300 MG tablet, TAKE 1 TABLET BY MOUTH TWICE DAILY, Disp: 60 tablet, Rfl: 5  •  spironolactone (ALDACTONE) 25 MG tablet, Take 1 tablet by mouth Daily., Disp: 30 tablet, Rfl: 11    The following portions of the patient's history were reviewed and updated as appropriate: allergies, current medications, past family  "history, past medical history, past social history, past surgical history and problem list.    Review of Systems   Constitution: Negative for chills, diaphoresis and fever.   HENT: Negative for congestion, ear pain and sore throat.    Eyes: Negative for blurred vision, pain and redness.   Cardiovascular: Positive for leg swelling and palpitations. Negative for chest pain, orthopnea, paroxysmal nocturnal dyspnea and syncope.   Respiratory: Positive for shortness of breath. Negative for cough and hemoptysis.    Endocrine: Negative for cold intolerance and heat intolerance.   Hematologic/Lymphatic: Does not bruise/bleed easily.   Skin: Negative for rash.   Musculoskeletal: Negative for arthritis, joint swelling, muscle cramps, myalgias and stiffness.   Gastrointestinal: Negative for abdominal pain, constipation, diarrhea, hematemesis, hematochezia, melena, nausea and vomiting.   Genitourinary: Negative for dysuria and hematuria.   Neurological: Negative for dizziness, focal weakness and numbness.   Psychiatric/Behavioral: Negative for depression. The patient is not nervous/anxious.        Objective   Blood pressure 145/81, pulse 85, resp. rate 16, height 170.2 cm (67.01\"), weight 102 kg (224 lb 9.6 oz).    Physical Exam   Constitutional: He is oriented to person, place, and time. He appears well-developed and well-nourished.   WM sitting comfortably on chair.   HENT:   Mouth/Throat: Oropharynx is clear and moist.   Eyes: EOM are normal. Pupils are equal, round, and reactive to light.   Neck: Neck supple. No JVD present. No tracheal deviation present. No thyromegaly present.   Cardiovascular: Normal rate, regular rhythm, S1 normal and S2 normal.  Exam reveals no gallop and no friction rub.    No murmur heard.  Pulmonary/Chest: Effort normal and breath sounds normal. No respiratory distress. He has no wheezes. He has no rales.   Abdominal: Soft. Bowel sounds are normal. He exhibits no mass. There is tenderness. "   Musculoskeletal: Normal range of motion. He exhibits no edema.   Lymphadenopathy:     He has no cervical adenopathy.   Neurological: He is alert and oriented to person, place, and time.   Skin: Skin is warm and dry. No rash noted.   Psychiatric: He has a normal mood and affect.       Procedures    Assessment/Plan     1.  Chest pain: Patient with chest pain that appears to be noncardiac in nature.  Further evaluation of this condition is to be done by the primary care provider.    2.  Hypertension: Patient with history of hypertension which appears to be controlled on current regimen.  We will check a CMP to evaluate kidney and liver function.    3.  Dyslipidemia: Patient with history of dyslipidemia and no recent lipid panel record.  Will check.    4.  Palpitations: Patient with history of palpitations that are concerning for possible arrhythmias.  A recent echocardiogram which showed no evidence of structural abnormalities.  Will evaluate further 48 hour Holter monitor.     Diagnosis Plan   1. Precordial pain     2. Essential hypertension  Comprehensive Metabolic Panel   3. Mixed hyperlipidemia  Lipid Panel   4. Heart palpitations  Holter Monitor - 48 Hour            No Follow-up on file.    I appreciate the opportunity to participate in this patient's cardiovascular care.    Best Regards    Santino Olson

## 2018-05-08 ENCOUNTER — HOSPITAL ENCOUNTER (OUTPATIENT)
Dept: RESPIRATORY THERAPY | Facility: HOSPITAL | Age: 48
Discharge: HOME | End: 2018-05-08
Attending: INTERNAL MEDICINE | Admitting: INTERNAL MEDICINE

## 2018-05-08 ENCOUNTER — LAB (OUTPATIENT)
Dept: LAB | Facility: HOSPITAL | Age: 48
End: 2018-05-08
Attending: INTERNAL MEDICINE

## 2018-05-08 DIAGNOSIS — I10 ESSENTIAL HYPERTENSION: ICD-10-CM

## 2018-05-08 DIAGNOSIS — E78.2 MIXED HYPERLIPIDEMIA: ICD-10-CM

## 2018-05-08 DIAGNOSIS — R00.2 HEART PALPITATIONS: ICD-10-CM

## 2018-05-08 LAB
ALBUMIN SERPL-MCNC: 4.1 G/DL (ref 3.5–5)
ALBUMIN/GLOB SERPL: 1.2 G/DL (ref 1.5–2.5)
ALP SERPL-CCNC: 80 U/L (ref 40–129)
ALT SERPL W P-5'-P-CCNC: 23 U/L (ref 10–44)
ANION GAP SERPL CALCULATED.3IONS-SCNC: 5.3 MMOL/L (ref 3.6–11.2)
AST SERPL-CCNC: 21 U/L (ref 10–34)
BILIRUB SERPL-MCNC: 0.6 MG/DL (ref 0.2–1.8)
BUN BLD-MCNC: 18 MG/DL (ref 7–21)
BUN/CREAT SERPL: 18.8 (ref 7–25)
CALCIUM SPEC-SCNC: 9.1 MG/DL (ref 7.7–10)
CHLORIDE SERPL-SCNC: 102 MMOL/L (ref 99–112)
CHOLEST SERPL-MCNC: 210 MG/DL (ref 0–200)
CO2 SERPL-SCNC: 28.7 MMOL/L (ref 24.3–31.9)
CREAT BLD-MCNC: 0.96 MG/DL (ref 0.43–1.29)
GFR SERPL CREATININE-BSD FRML MDRD: 85 ML/MIN/1.73
GLOBULIN UR ELPH-MCNC: 3.5 GM/DL
GLUCOSE BLD-MCNC: 94 MG/DL (ref 70–110)
HDLC SERPL-MCNC: 67 MG/DL (ref 60–100)
LDLC SERPL CALC-MCNC: 132 MG/DL (ref 0–100)
LDLC/HDLC SERPL: 1.96 {RATIO}
OSMOLALITY SERPL CALC.SUM OF ELEC: 273.6 MOSM/KG (ref 273–305)
POTASSIUM BLD-SCNC: 4.8 MMOL/L (ref 3.5–5.3)
PROT SERPL-MCNC: 7.6 G/DL (ref 6–8)
SODIUM BLD-SCNC: 136 MMOL/L (ref 135–153)
TRIGL SERPL-MCNC: 57 MG/DL (ref 0–150)
VLDLC SERPL-MCNC: 11.4 MG/DL

## 2018-05-08 PROCEDURE — 80061 LIPID PANEL: CPT

## 2018-05-08 PROCEDURE — 93226 XTRNL ECG REC<48 HR SCAN A/R: CPT

## 2018-05-08 PROCEDURE — 80053 COMPREHEN METABOLIC PANEL: CPT

## 2018-05-08 PROCEDURE — 36415 COLL VENOUS BLD VENIPUNCTURE: CPT

## 2018-05-08 PROCEDURE — 93225 XTRNL ECG REC<48 HRS REC: CPT

## 2018-05-14 PROCEDURE — 93227 XTRNL ECG REC<48 HR R&I: CPT | Performed by: INTERNAL MEDICINE

## 2018-06-01 ENCOUNTER — OFFICE VISIT (OUTPATIENT)
Dept: FAMILY MEDICINE CLINIC | Facility: CLINIC | Age: 48
End: 2018-06-01

## 2018-06-01 VITALS
SYSTOLIC BLOOD PRESSURE: 150 MMHG | TEMPERATURE: 98.2 F | BODY MASS INDEX: 33.81 KG/M2 | OXYGEN SATURATION: 96 % | HEART RATE: 101 BPM | HEIGHT: 67 IN | DIASTOLIC BLOOD PRESSURE: 100 MMHG | WEIGHT: 215.4 LBS

## 2018-06-01 DIAGNOSIS — E78.00 PURE HYPERCHOLESTEROLEMIA: ICD-10-CM

## 2018-06-01 DIAGNOSIS — G89.29 CHRONIC BILATERAL LOW BACK PAIN WITH LEFT-SIDED SCIATICA: ICD-10-CM

## 2018-06-01 DIAGNOSIS — K21.9 GASTROESOPHAGEAL REFLUX DISEASE WITHOUT ESOPHAGITIS: ICD-10-CM

## 2018-06-01 DIAGNOSIS — I10 ESSENTIAL HYPERTENSION: ICD-10-CM

## 2018-06-01 DIAGNOSIS — M54.42 CHRONIC BILATERAL LOW BACK PAIN WITH LEFT-SIDED SCIATICA: ICD-10-CM
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
## 2018-06-01 DIAGNOSIS — J45.41 MODERATE PERSISTENT ASTHMA WITH EXACERBATION: ICD-10-CM

## 2018-06-01 DIAGNOSIS — J45.20 MILD INTERMITTENT ASTHMA WITHOUT COMPLICATION: ICD-10-CM

## 2018-06-01 PROCEDURE — 99214 OFFICE O/P EST MOD 30 MIN: CPT | Performed by: NURSE PRACTITIONER

## 2018-06-01 RX ORDER — ALBUTEROL SULFATE 90 UG/1
2 AEROSOL, METERED RESPIRATORY (INHALATION) EVERY 4 HOURS PRN
Qty: 18 G | Refills: 5 | Status: ON HOLD | OUTPATIENT
Start: 2018-06-01 | End: 2018-09-25

## 2018-06-01 RX ORDER — RANITIDINE 300 MG/1
300 TABLET ORAL 2 TIMES DAILY
Qty: 60 TABLET | Refills: 5 | Status: ON HOLD | OUTPATIENT
Start: 2018-06-01 | End: 2018-09-25

## 2018-06-01 RX ORDER — PANTOPRAZOLE SODIUM 40 MG/1
40 TABLET, DELAYED RELEASE ORAL DAILY
Qty: 30 TABLET | Refills: 5 | Status: SHIPPED | OUTPATIENT
Start: 2018-06-01 | End: 2018-11-26 | Stop reason: SDUPTHER

## 2018-06-01 RX ORDER — LISINOPRIL 20 MG/1
20 TABLET ORAL DAILY
Qty: 30 TABLET | Refills: 5 | Status: SHIPPED | OUTPATIENT
Start: 2018-06-01 | End: 2018-11-26 | Stop reason: SDUPTHER

## 2018-06-01 RX ORDER — MONTELUKAST SODIUM 10 MG/1
10 TABLET ORAL NIGHTLY
Qty: 30 TABLET | Refills: 5 | Status: SHIPPED | OUTPATIENT
Start: 2018-06-01 | End: 2018-11-02 | Stop reason: SDUPTHER

## 2018-06-01 RX ORDER — PRAVASTATIN SODIUM 40 MG
40 TABLET ORAL
Qty: 30 TABLET | Refills: 5 | Status: SHIPPED | OUTPATIENT
Start: 2018-06-01 | End: 2018-06-26 | Stop reason: SDUPTHER

## 2018-06-01 RX ORDER — HYDROCODONE BITARTRATE AND ACETAMINOPHEN 7.5; 325 MG/1; MG/1
1 TABLET ORAL 2 TIMES DAILY PRN
Qty: 60 TABLET | Refills: 0 | Status: SHIPPED | OUTPATIENT
Start: 2018-06-01 | End: 2018-06-29 | Stop reason: DRUGHIGH

## 2018-06-01 RX ORDER — PHENTERMINE HYDROCHLORIDE 37.5 MG/1
37.5 TABLET ORAL
Qty: 30 TABLET | Refills: 0 | Status: SHIPPED | OUTPATIENT
Start: 2018-06-01 | End: 2018-08-31

## 2018-06-01 NOTE — PROGRESS NOTES
"Subjective   Jaquan Mckay is a 47 y.o. male.     Chief Complaint   Patient presents with   • Knee Pain       History of Present Illness     Bruising-has been more notable since Monday.  He reports 2 falls this week.  Multiple fading bruises on right leg from knee to top of foot.  He reports area over Tib/fib area that is swollen with tender.  He reports some bruising on his left leg as well.  Pain in his right leg occurred while he was walking.  He reports it was sharp, burning, and caused him to \"fall to the floor\".  He reports areas on his legs are tender.  Has an appt on 6/5 @ 2 PM with vascular.  Ongoing.   Tick bite-he reports he has removed 4 ticks from himself approx 3 weeks ago.  He reports he was mowing grass.  He did remove the whole tick.   He reports increase in fatigue and general Malaise.  Ongoing.    Stressors-reports he has been kicked out of \"[employer] [facility]\" and has been living in his car.  He is estranged from his spouse.  He is looking for an apartment.  His appetite is poor.      The following portions of the patient's history were reviewed and updated as appropriate: allergies, current medications, past family history, past medical history, past social history, past surgical history and problem list.    Review of Systems   Constitutional: Positive for appetite change and fatigue. Negative for unexpected weight change.   HENT: Negative for congestion, ear pain, nosebleeds, postnasal drip, rhinorrhea, sinus pain, sinus pressure, sore throat, trouble swallowing and voice change.    Eyes: Negative for pain and visual disturbance.   Respiratory: Negative for cough, shortness of breath and wheezing.    Cardiovascular: Negative for chest pain and palpitations.   Gastrointestinal: Negative for abdominal pain, constipation, diarrhea and vomiting.   Endocrine: Negative for cold intolerance and polydipsia.   Genitourinary: Negative for difficulty urinating, flank pain and hematuria.   Musculoskeletal: " "Positive for arthralgias and back pain. Negative for gait problem, joint swelling and myalgias.   Skin: Negative for color change and rash.   Allergic/Immunologic: Negative.    Neurological: Positive for weakness and headaches. Negative for syncope and numbness.   Hematological: Negative.    Psychiatric/Behavioral: Positive for agitation, dysphoric mood and sleep disturbance. Negative for suicidal ideas. The patient is not nervous/anxious.    All other systems reviewed and are negative.      Objective     /100   Pulse 101   Temp 98.2 °F (36.8 °C) (Temporal Artery )   Ht 170.2 cm (67.01\")   Wt 97.7 kg (215 lb 6.4 oz)   SpO2 96%   BMI 33.73 kg/m²     Physical Exam   Constitutional: He is oriented to person, place, and time. He appears well-developed and well-nourished.   HENT:   Head: Normocephalic and atraumatic.   Right Ear: External ear and ear canal normal. Tympanic membrane is not erythematous.   Left Ear: External ear and ear canal normal. Tympanic membrane is not erythematous.   Nose: Mucosal edema and rhinorrhea present.   Mouth/Throat: No oropharyngeal exudate or posterior oropharyngeal erythema.   Eyes: Conjunctivae and EOM are normal. Pupils are equal, round, and reactive to light. No scleral icterus.   Neck: Normal range of motion. Neck supple. No JVD present. No thyromegaly present.   Cardiovascular: Normal rate, regular rhythm and normal heart sounds.    No murmur heard.  Pulmonary/Chest: Effort normal. No respiratory distress. He has no decreased breath sounds. He has no wheezes. He exhibits no tenderness.   Abdominal: Soft. Bowel sounds are normal. He exhibits no mass. There is no tenderness.   Musculoskeletal: He exhibits tenderness. He exhibits no edema.        Right elbow: Tenderness found. Medial epicondyle and olecranon process tenderness noted.        Left knee: He exhibits decreased range of motion and swelling (mild). He exhibits no ecchymosis. Tenderness found. Medial joint line " and lateral joint line tenderness noted.        Lumbar back: He exhibits decreased range of motion, tenderness, pain and spasm. He exhibits no swelling.        Right hand: He exhibits tenderness (3rd digit). He exhibits normal capillary refill.     Skin Integrity  -  His right foot skin is intact.His left foot skin is intact..  Lymphadenopathy:        Head (right side): No submandibular adenopathy present.        Head (left side): No submandibular adenopathy present.     He has no cervical adenopathy.   Neurological: He is alert and oriented to person, place, and time. He has normal reflexes. No cranial nerve deficit. He exhibits normal muscle tone. Coordination normal.   Skin: Skin is warm and dry. Capillary refill takes less than 2 seconds. Ecchymosis (bilateral Tib/Fib area) noted.   Psychiatric: His speech is normal and behavior is normal. Judgment and thought content normal. His mood appears not anxious. He is not actively hallucinating. Cognition and memory are normal. He does not exhibit a depressed mood. He expresses no homicidal and no suicidal ideation. He exhibits normal recent memory and normal remote memory. He is attentive.   Vitals reviewed.        Assessment/Plan     Problem List Items Addressed This Visit        Cardiovascular and Mediastinum    Hypertension    Relevant Medications    lisinopril (PRINIVIL,ZESTRIL) 20 MG tablet    Hyperlipidemia    Relevant Medications    pravastatin (PRAVACHOL) 40 MG tablet       Digestive    GERD (gastroesophageal reflux disease)    Relevant Medications    pantoprazole (PROTONIX) 40 MG EC tablet    raNITIdine (ZANTAC) 300 MG tablet       Nervous and Auditory    Chronic bilateral low back pain with left-sided sciatica    Relevant Medications    HYDROcodone-acetaminophen (NORCO) 7.5-325 MG per tablet       Other    BMI 35.0-35.9,adult    Relevant Medications    phentermine (ADIPEX-P) 37.5 MG tablet      Other Visit Diagnoses     Fall, sequela    -  Primary     Relevant Orders    XR tibia fibula 2 vw left    XR tibia fibula 2 vw right    Mild intermittent asthma without complication        Relevant Medications    montelukast (SINGULAIR) 10 MG tablet    albuterol (PROVENTIL HFA;VENTOLIN HFA) 108 (90 Base) MCG/ACT inhaler    Moderate persistent asthma with exacerbation        Relevant Medications    montelukast (SINGULAIR) 10 MG tablet    albuterol (PROVENTIL HFA;VENTOLIN HFA) 108 (90 Base) MCG/ACT inhaler        appt info given for vascular appt.  Advised to go to [facility] care and resume his care.  Patient reports he will.   Understands disease processes and need for medications.  Understands reasons for urgent and emergent care.  Patient (& family) verbalized agreement for treatment plan.   Emotional support and active listening provided.  Patient provided time to verbalize feelings.  CHAVEZ reviewed today and consistent.  Will refill prescribed controlled medication today.  Patient is aware they cannot receive narcotics from any other provider except if under care of pain management or speciality clinic.  Risk and benefits of medication use has been reviewed.  History and physical exam exhibit continued safe and appropriate use of controlled substances.  Refill on routine maintenance meds today.   RTC 1 month, sooner if needed.           This document has been electronically signed by:  BALDOMERO Thao, NESHAP-C

## 2018-06-13 ENCOUNTER — HOSPITAL ENCOUNTER (OUTPATIENT)
Dept: GENERAL RADIOLOGY | Facility: HOSPITAL | Age: 48
Discharge: HOME | End: 2018-06-13
Admitting: NURSE PRACTITIONER
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
## 2018-06-13 PROCEDURE — 73590 X-RAY EXAM OF LOWER LEG: CPT | Performed by: RADIOLOGY

## 2018-06-13 PROCEDURE — 73590 X-RAY EXAM OF LOWER LEG: CPT

## 2018-06-19 ENCOUNTER — OFFICE VISIT (OUTPATIENT)
Dept: FAMILY MEDICINE CLINIC | Facility: CLINIC | Age: 48
End: 2018-06-19

## 2018-06-19 VITALS
DIASTOLIC BLOOD PRESSURE: 98 MMHG | SYSTOLIC BLOOD PRESSURE: 150 MMHG | HEART RATE: 109 BPM | HEIGHT: 67 IN | BODY MASS INDEX: 32.49 KG/M2 | OXYGEN SATURATION: 98 % | WEIGHT: 207 LBS | TEMPERATURE: 98.3 F

## 2018-06-19 DIAGNOSIS — F41.1 GENERALIZED ANXIETY DISORDER: Primary | ICD-10-CM

## 2018-06-19 PROCEDURE — 99213 OFFICE O/P EST LOW 20 MIN: CPT | Performed by: NURSE PRACTITIONER

## 2018-06-19 RX ORDER — CLONAZEPAM 0.5 MG/1
0.5 TABLET ORAL DAILY PRN
Qty: 20 TABLET | Refills: 0 | Status: SHIPPED | OUTPATIENT
Start: 2018-06-19 | End: 2018-07-10 | Stop reason: SDUPTHER
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
## 2018-06-26 ENCOUNTER — OFFICE VISIT (OUTPATIENT)
Dept: CARDIOLOGY | Facility: CLINIC | Age: 48
End: 2018-06-26

## 2018-06-26 VITALS
DIASTOLIC BLOOD PRESSURE: 89 MMHG | WEIGHT: 206 LBS | HEART RATE: 86 BPM | BODY MASS INDEX: 32.33 KG/M2 | RESPIRATION RATE: 16 BRPM | HEIGHT: 67 IN | SYSTOLIC BLOOD PRESSURE: 132 MMHG

## 2018-06-26 DIAGNOSIS — R07.2 PRECORDIAL PAIN: Primary | ICD-10-CM

## 2018-06-26 DIAGNOSIS — I10 ESSENTIAL HYPERTENSION: ICD-10-CM

## 2018-06-26 DIAGNOSIS — R06.02 SOB (SHORTNESS OF BREATH): ICD-10-CM

## 2018-06-26 DIAGNOSIS — E78.00 PURE HYPERCHOLESTEROLEMIA: ICD-10-CM

## 2018-06-26 DIAGNOSIS — R00.2 HEART PALPITATIONS: ICD-10-CM

## 2018-06-26 PROCEDURE — 99213 OFFICE O/P EST LOW 20 MIN: CPT | Performed by: INTERNAL MEDICINE

## 2018-06-26 RX ORDER — DILTIAZEM HYDROCHLORIDE 120 MG/1
120 CAPSULE, EXTENDED RELEASE ORAL DAILY
Qty: 30 CAPSULE | Refills: 6 | Status: SHIPPED | OUTPATIENT
Start: 2018-06-26 | End: 2018-07-03

## 2018-06-26 RX ORDER — PRAVASTATIN SODIUM 80 MG/1
80 TABLET ORAL
Qty: 30 TABLET | Refills: 6 | Status: SHIPPED | OUTPATIENT
Start: 2018-06-26 | End: 2018-12-07 | Stop reason: SDUPTHER

## 2018-06-26 NOTE — PROGRESS NOTES
BALDOMERO Thao  Jaquan Mckay  1970 05/04/2018    Patient Active Problem List   Diagnosis   • GERD (gastroesophageal reflux disease)   • Arthritis   • Hypertension   • Seizure disorder   • Hyperlipidemia   • Anxiety   • Varicocele   • Varicocele present on ultrasound of scrotum   • Obesity (BMI 30.0-34.9)   • Chronic bilateral low back pain with left-sided sciatica   • Seasonal allergic rhinitis due to pollen   • Mild intermittent asthma with acute exacerbation   • Precordial pain   • Dysuria   • Congenital coronary artery anomaly   • BMI 35.0-35.9,adult   • Chondromalacia, knee   • Achilles tendinitis of both lower extremities   • Muscle spasm of both lower legs   • Personal history of allergy to shellfish   • Sensation of cold in lower extremity   • Varicose vein of leg   • Heart palpitations   • SOB (shortness of breath)       Dear BALDOMERO Thao:    Subjective     Jaquan Mckay is a 47 y.o. male with the above medical problems who is here today for follow-up. According to the patient he has continued to present with chest pain.  He describes the pain as sharp, midsternal, radiating to his neck, back and left arm.  He states it worsens with exertion and improves with rest.  He is it associated shortness of breath and palpitations.  He states the intensity is 9/10 on the pain scale.  He also complains of shortness of breath on routine activity as well as occasional lower extremity edema.  He has occasional episodes of palpitations that occur apparently once per week.  He denies any exacerbating or ameliorating factors.  He states they're associated with shortness of breath. He underwent Holter monitor last visit which showed no evidence of significant arrhythmias.  Does have an extensive history of tobacco use and continues to smoke in spite of multiple counseling sessions on cessation.    Current Outpatient Prescriptions:   •  albuterol (PROVENTIL HFA;VENTOLIN HFA) 108 (90 Base) MCG/ACT  inhaler, Inhale 2 puffs Every 4 (Four) Hours As Needed for Wheezing or Shortness of Air., Disp: 18 g, Rfl: 5  •  budesonide-formoterol (SYMBICORT) 160-4.5 MCG/ACT inhaler, Inhale 2 puffs 2 (Two) Times a Day., Disp: 10.2 g, Rfl: 5  •  cetirizine (zyrTEC) 10 MG tablet, , Disp: , Rfl:   •  clonazePAM (KlonoPIN) 0.5 MG tablet, Take 1 tablet by mouth Daily As Needed for Anxiety., Disp: 20 tablet, Rfl: 0  •  cyclobenzaprine (FLEXERIL) 10 MG tablet, Take 1 tablet by mouth 2 (Two) Times a Day As Needed for Muscle Spasms., Disp: 60 tablet, Rfl: 5  •  DILANTIN 100 MG ER capsule, TAKE 2 CAPSULES BY MOUTH EVERY MORNING AND TAKE 3 CAPSULES EVERY EVENING, Disp: 150 capsule, Rfl: 5  •  EPINEPHrine (EPIPEN IJ), Inject  as directed As Needed., Disp: , Rfl:   •  HYDROcodone-acetaminophen (NORCO) 7.5-325 MG per tablet, Take 1 tablet by mouth 2 (Two) Times a Day As Needed for Mild Pain  or Moderate Pain ., Disp: 60 tablet, Rfl: 0  •  lisinopril (PRINIVIL,ZESTRIL) 20 MG tablet, Take 1 tablet by mouth Daily. for blood pressure., Disp: 30 tablet, Rfl: 5  •  montelukast (SINGULAIR) 10 MG tablet, Take 1 tablet by mouth Every Night., Disp: 30 tablet, Rfl: 5  •  pantoprazole (PROTONIX) 40 MG EC tablet, Take 1 tablet by mouth Daily., Disp: 30 tablet, Rfl: 5  •  phentermine (ADIPEX-P) 37.5 MG tablet, Take 1 tablet by mouth Every Morning Before Breakfast., Disp: 30 tablet, Rfl: 0  •  pravastatin (PRAVACHOL) 80 MG tablet, Take 1 tablet by mouth every night at bedtime., Disp: 30 tablet, Rfl: 6  •  raNITIdine (ZANTAC) 300 MG tablet, Take 1 tablet by mouth 2 (Two) Times a Day., Disp: 60 tablet, Rfl: 5  •  diltiazem XR (DILACOR XR) 120 MG 24 hr capsule, Take 1 capsule by mouth Daily., Disp: 30 capsule, Rfl: 6  •  ipratropium-albuterol (COMBIVENT RESPIMAT)  MCG/ACT inhaler, Inhale 1 puff 4 (Four) Times a Day As Needed for Wheezing., Disp: 4 g, Rfl: 11  •  loratadine (CLARITIN) 10 MG tablet, TAKE 1 TABLET BY MOUTH EVERY DAY FOR ALLERGIES, Disp:  "30 tablet, Rfl: 5  •  ondansetron ODT (ZOFRAN-ODT) 4 MG disintegrating tablet, , Disp: , Rfl:   •  spironolactone (ALDACTONE) 25 MG tablet, Take 1 tablet by mouth Daily., Disp: 30 tablet, Rfl: 11    The following portions of the patient's history were reviewed and updated as appropriate: allergies, current medications, past family history, past medical history, past social history, past surgical history and problem list.    Review of Systems   Constitution: Positive for chills and fever. Negative for diaphoresis.   HENT: Negative for congestion, ear pain and sore throat.    Eyes: Negative for blurred vision, pain and redness.   Cardiovascular: Positive for chest pain, dyspnea on exertion, leg swelling and palpitations. Negative for orthopnea and paroxysmal nocturnal dyspnea.   Respiratory: Positive for shortness of breath. Negative for cough and hemoptysis.    Endocrine: Negative for cold intolerance and heat intolerance.   Hematologic/Lymphatic: Does not bruise/bleed easily.   Skin: Negative for rash.   Musculoskeletal: Positive for arthritis, back pain, joint pain, joint swelling and stiffness. Negative for myalgias.   Gastrointestinal: Negative for abdominal pain, constipation, diarrhea, hematemesis, hematochezia, melena, nausea and vomiting.   Genitourinary: Positive for hematuria. Negative for dysuria.   Neurological: Negative for dizziness, focal weakness and numbness.   Psychiatric/Behavioral: Positive for depression. The patient is nervous/anxious.        Objective   Blood pressure 132/89, pulse 86, resp. rate 16, height 170.2 cm (67\"), weight 93.4 kg (206 lb).    Physical Exam   Constitutional: He is oriented to person, place, and time. He appears well-developed and well-nourished.   WM sitting comfortably on chair.   HENT:   Mouth/Throat: Oropharynx is clear and moist.   Eyes: EOM are normal. Pupils are equal, round, and reactive to light.   Neck: Neck supple. No JVD present. No tracheal deviation present. " No thyromegaly present.   Cardiovascular: Normal rate, regular rhythm, S1 normal and S2 normal.  Exam reveals no gallop and no friction rub.    No murmur heard.  Pulmonary/Chest: Effort normal and breath sounds normal. No respiratory distress. He has no wheezes. He has no rales.   Abdominal: Soft. Bowel sounds are normal. He exhibits no mass. There is tenderness.   Musculoskeletal: Normal range of motion. He exhibits no edema.   Lymphadenopathy:     He has no cervical adenopathy.   Neurological: He is alert and oriented to person, place, and time.   Skin: Skin is warm and dry. No rash noted.   Psychiatric: He has a normal mood and affect.       Procedures    Assessment/Plan     1.  Chest pain: Patient with chest pain that appears to be noncardiac in nature.  Further evaluation of this condition is to be done by the primary care provider.  His could be related to GI discomfort as he continues to complain of abdominal tenderness.  He has undergone a stresses which was noted to be within normal limits.  His echo cardioversion shows normal LV function and wall motion.    2.  Palpitations: The patient continues to complain of palpitations.  He underwent a Holter monitor which showed no evidence of arrhythmias. He is unable to take beta blockers due to allergy.  At this point will start on diltiazem 120 mg by mouth daily and monitor for improvement.    3.  Hypertension: Patient with history of hypertension which appears to be controlled on current regimen.  We'll need to monitor closely after starting diltiazem.  According to the patient is no longer taking amlodipine.    4.  Dyslipidemia: Patient with history of dyslipidemia with recent lipid panel showed abnormal lipids.  At this point will increase pravastatin to 80 mg by mouth daily and monitor for improvement.    5.  Shortness of breath: Patient with shortness of breath appears to be related to pulmonary disease.  He continues to smoke in spite of multiple counseling  sessions on tobacco cessation.  We will obtain pulmonary function tests to evaluate further.    6.  Tobacco use: Patient with extensive history of tobacco use.  I once again counseled him on the importance of tobacco cessation.       Diagnosis Plan   1. Precordial pain     2. Heart palpitations     3. Essential hypertension     4. Pure hypercholesterolemia  pravastatin (PRAVACHOL) 80 MG tablet   5. SOB (shortness of breath)  Full Pulmonary Function Test With Bronchodilator            Return in about 3 months (around 9/26/2018).    I appreciate the opportunity to participate in this patient's cardiovascular care.    Best Regards    Santnio Olson

## 2018-06-29 ENCOUNTER — OFFICE VISIT (OUTPATIENT)
Dept: FAMILY MEDICINE CLINIC | Facility: CLINIC | Age: 48
End: 2018-06-29

## 2018-06-29 VITALS
OXYGEN SATURATION: 98 % | TEMPERATURE: 98.3 F | HEIGHT: 67 IN | BODY MASS INDEX: 31.55 KG/M2 | DIASTOLIC BLOOD PRESSURE: 110 MMHG | SYSTOLIC BLOOD PRESSURE: 160 MMHG | WEIGHT: 201 LBS | HEART RATE: 116 BPM

## 2018-06-29 DIAGNOSIS — M94.262 CHONDROMALACIA OF LEFT KNEE: ICD-10-CM

## 2018-06-29 DIAGNOSIS — M54.42 CHRONIC BILATERAL LOW BACK PAIN WITH LEFT-SIDED SCIATICA: ICD-10-CM

## 2018-06-29 DIAGNOSIS — M76.61 ACHILLES TENDINITIS OF BOTH LOWER EXTREMITIES: ICD-10-CM

## 2018-06-29 DIAGNOSIS — G89.29 CHRONIC BILATERAL LOW BACK PAIN WITH LEFT-SIDED SCIATICA: ICD-10-CM

## 2018-06-29 DIAGNOSIS — E78.2 MIXED HYPERLIPIDEMIA: ICD-10-CM

## 2018-06-29 DIAGNOSIS — M76.62 ACHILLES TENDINITIS OF BOTH LOWER EXTREMITIES: ICD-10-CM

## 2018-06-29 DIAGNOSIS — R30.0 DYSURIA: Primary | ICD-10-CM

## 2018-06-29 LAB
BILIRUB BLD-MCNC: ABNORMAL MG/DL
CLARITY, POC: ABNORMAL
COLOR UR: ABNORMAL
GLUCOSE UR STRIP-MCNC: NEGATIVE MG/DL
KETONES UR QL: ABNORMAL
LEUKOCYTE EST, POC: NEGATIVE
NITRITE UR-MCNC: NEGATIVE MG/ML
PH UR: 6 [PH] (ref 5–8)
PROT UR STRIP-MCNC: ABNORMAL MG/DL
RBC # UR STRIP: NEGATIVE /UL
SP GR UR: 1.03 (ref 1–1.03)
UROBILINOGEN UR QL: ABNORMAL

## 2018-06-29 PROCEDURE — 99214 OFFICE O/P EST MOD 30 MIN: CPT | Performed by: NURSE PRACTITIONER

## 2018-06-29 RX ORDER — HYDROCODONE BITARTRATE AND ACETAMINOPHEN 10; 325 MG/1; MG/1
1 TABLET ORAL 2 TIMES DAILY PRN
Qty: 60 TABLET | Refills: 0 | Status: SHIPPED | OUTPATIENT
Start: 2018-06-29 | End: 2018-07-31 | Stop reason: SDUPTHER

## 2018-06-29 NOTE — PROGRESS NOTES
"Subjective   Jaquan Mckay is a 47 y.o. male.     Chief Complaint   Patient presents with   • Anxiety   • Depression   • Arthritis       History of Present Illness     HTN-reports he as taken his meds but is \"under a lot of stress\" at present.  He is under cardiology.  He will be having a \"breathing test\" to rule out pulmonary symptoms.  He has also been started on Diltiazem 120 mg daily in addition to Lisinopril and Aldactone.  Ongoing.    Anxiety-reports he is not having any help with the Klonopin.  He reports he will have to go to court on \"the 17th\".  He reports he is not sleeping.  Is having some panic symptoms where he has numbness in his hands and arms.  He reports he is too agitated to eat.   He continues to go to [facility] and has a session on July 9 when the counselor returns to the office.  Ongoing.     Lipids-reports after a cholesterol panel his meds were increased per cardio.  He will on taking Pravastatin 80 mg.  Ongoing.    GERD-mildly acute today due to \"stress\" and poor dietary intake.  Taking meds as directed.  No negative side effects.  Ongoing.    Bilateral flank pain-reports has been present for nearly a week.  He reports radiation down toward his pelvis.  He is having burning with urination.  He reports his fluid intake has been poor.  Ongoing.  Pain-reports he is having increase in his overall pain.  He reports he feels his pain meds are not effective.  He feels it is lasting shorter time.  He is having knee pain chronically and pain in his low back.    He does have foot pain for which he was fitted for orthotics.  Ongoing.      The following portions of the patient's history were reviewed and updated as appropriate: allergies, current medications, past family history, past medical history, past social history, past surgical history and problem list.    Review of Systems   Constitutional: Positive for appetite change and fatigue. Negative for unexpected weight change.   HENT: Negative for " "congestion, ear pain, nosebleeds, postnasal drip, rhinorrhea, sinus pain, sinus pressure, sore throat, trouble swallowing and voice change.    Eyes: Negative for pain and visual disturbance.   Respiratory: Positive for cough. Negative for chest tightness, shortness of breath and wheezing.    Cardiovascular: Positive for chest pain. Negative for palpitations.   Gastrointestinal: Positive for diarrhea (watery he reports with abd cramping in lower abdomen). Negative for abdominal pain, constipation and vomiting.   Endocrine: Negative for cold intolerance and polydipsia.   Genitourinary: Negative for difficulty urinating, dysuria, flank pain and hematuria.   Musculoskeletal: Positive for arthralgias and back pain. Negative for gait problem, joint swelling and myalgias.   Skin: Negative for color change and rash.   Allergic/Immunologic: Negative.    Neurological: Positive for dizziness, weakness, numbness and headaches. Negative for seizures and syncope.   Hematological: Negative.    Psychiatric/Behavioral: Positive for agitation, decreased concentration, dysphoric mood and sleep disturbance. Negative for suicidal ideas. The patient is nervous/anxious.    All other systems reviewed and are negative.      Objective     BP (!) 160/110   Pulse 116   Temp 98.3 °F (36.8 °C) (Temporal Artery )   Ht 170.2 cm (67\")   Wt 91.2 kg (201 lb)   SpO2 98%   BMI 31.48 kg/m²     Physical Exam   Constitutional: He is oriented to person, place, and time. He appears well-developed and well-nourished.   HENT:   Head: Normocephalic and atraumatic.   Right Ear: External ear and ear canal normal. Tympanic membrane is not erythematous.   Left Ear: External ear and ear canal normal. Tympanic membrane is not erythematous.   Nose: Mucosal edema and rhinorrhea present.   Mouth/Throat: No oropharyngeal exudate or posterior oropharyngeal erythema.   Eyes: Conjunctivae and EOM are normal. Pupils are equal, round, and reactive to light. No scleral " icterus.   Neck: Normal range of motion. Neck supple. No JVD present. No thyromegaly present.   Cardiovascular: Normal rate, regular rhythm and normal heart sounds.    No murmur heard.  Pulmonary/Chest: Effort normal. No respiratory distress. He has no decreased breath sounds. He has no wheezes. He exhibits no tenderness.   Abdominal: Soft. Bowel sounds are normal. He exhibits no mass. There is no tenderness.   Musculoskeletal: He exhibits tenderness. He exhibits no edema.        Right elbow: Tenderness found. Medial epicondyle and olecranon process tenderness noted.        Left knee: He exhibits decreased range of motion and swelling (mild). He exhibits no ecchymosis. Tenderness found. Medial joint line and lateral joint line tenderness noted.        Lumbar back: He exhibits decreased range of motion, tenderness, pain and spasm. He exhibits no swelling.        Right hand: He exhibits tenderness (3rd digit). He exhibits normal capillary refill.     Skin Integrity  -  His right foot skin is intact.His left foot skin is intact..  Lymphadenopathy:        Head (right side): No submandibular adenopathy present.        Head (left side): No submandibular adenopathy present.     He has no cervical adenopathy.   Neurological: He is alert and oriented to person, place, and time. He has normal reflexes. No cranial nerve deficit. He exhibits normal muscle tone. Coordination normal.   Skin: Skin is warm and dry. Capillary refill takes less than 2 seconds. Ecchymosis (bilateral Tib/Fib area) noted.   Psychiatric: His speech is normal. Judgment and thought content normal. His mood appears anxious. He is slowed. He is not actively hallucinating. Cognition and memory are normal. He exhibits a depressed mood. He expresses no homicidal and no suicidal ideation. He exhibits normal recent memory and normal remote memory.   Crying and tearful during exam.  Flat affect He is attentive.   Vitals reviewed.      Assessment/Plan     Problem  List Items Addressed This Visit        Cardiovascular and Mediastinum    Hyperlipidemia    Current Assessment & Plan     Reviewed most recent lipids.              Nervous and Auditory    Chronic bilateral low back pain with left-sided sciatica    Relevant Medications    HYDROcodone-acetaminophen (NORCO)  MG per tablet    Dysuria - Primary    Relevant Orders    POC Urinalysis Dipstick, Automated       Musculoskeletal and Integument    Chondromalacia, knee    Overview     Added automatically from request for surgery [id number]         Relevant Medications    HYDROcodone-acetaminophen (NORCO)  MG per tablet    Achilles tendinitis of both lower extremities    Relevant Medications    HYDROcodone-acetaminophen (NORCO)  MG per tablet          Patient's Body mass index is 31.48 kg/m². BMI is above normal parameters. Recommendations include: no follow-up required.  Patient is on planned weight reduction.     (Normal BMI:  18.5-24.9, Overweight 25-29.9, Obesity 30 or greater)  I advised Jaquan of the risks of continuing to use tobacco, and I provided him with tobacco cessation educational materials in the After Visit Summary.   During this visit, I spent less than 2  minutes counseling the patient regarding tobacco cessation.  Understands disease processes and need for medications.  Understands reasons for urgent and emergent care.  Patient (& family) verbalized agreement for treatment plan.   Emotional support and active listening provided.  Patient provided time to verbalize feelings.  Advised to hold Adipex until after acute stressors has resolved due to his anxiety and elevated BP and HR.  Verbalized understanding.    Reports he will have to got to [facility] to fill his [place] today as [place] will be closed.    RTC 2-3 weeks for re-eval, sooner for worsening of symptoms.         This document has been electronically signed by:  BALDOMERO Thao, NAOMI

## 2018-06-29 NOTE — PATIENT INSTRUCTIONS
Steps to Quit Smoking  Smoking tobacco can be harmful to your health and can affect almost every organ in your body. Smoking puts you, and those around you, at risk for developing many serious chronic diseases. Quitting smoking is difficult, but it is one of the best things that you can do for your health. It is never too late to quit.  What are the benefits of quitting smoking?  When you quit smoking, you lower your risk of developing serious diseases and conditions, such as:  · Lung cancer or lung disease, such as COPD.  · Heart disease.  · Stroke.  · Heart attack.  · Infertility.  · Osteoporosis and bone fractures.    Additionally, symptoms such as coughing, wheezing, and shortness of breath may get better when you quit. You may also find that you get sick less often because your body is stronger at fighting off colds and infections. If you are pregnant, quitting smoking can help to reduce your chances of having a baby of low birth weight.  How do I get ready to quit?  When you decide to quit smoking, create a plan to make sure that you are successful. Before you quit:  · Pick a date to quit. Set a date within the next two weeks to give you time to prepare.  · Write down the reasons why you are quitting. Keep this list in places where you will see it often, such as on your bathroom mirror or in your car or wallet.  · Identify the people, places, things, and activities that make you want to smoke (triggers) and avoid them. Make sure to take these actions:  ? Throw away all cigarettes at home, at work, and in your car.  ? Throw away smoking accessories, such as ashtrays and lighters.  ? Clean your car and make sure to empty the ashtray.  ? Clean your home, including curtains and carpets.  · Tell your family, friends, and coworkers that you are quitting. Support from your loved ones can make quitting easier.  · Talk with your health care provider about your options for quitting smoking.  · Find out what treatment  options are covered by your health insurance.    What strategies can I use to quit smoking?  Talk with your healthcare provider about different strategies to quit smoking. Some strategies include:  · Quitting smoking altogether instead of gradually lessening how much you smoke over a period of time. Research shows that quitting “cold turkey” is more successful than gradually quitting.  · Attending in-person counseling to help you build problem-solving skills. You are more likely to have success in quitting if you attend several counseling sessions. Even short sessions of 10 minutes can be effective.  · Finding resources and support systems that can help you to quit smoking and remain smoke-free after you quit. These resources are most helpful when you use them often. They can include:  ? Online chats with a counselor.  ? Telephone quitlines.  ? Printed self-help materials.  ? Support groups or group counseling.  ? Text messaging programs.  ? Mobile phone applications.  · Taking medicines to help you quit smoking. (If you are pregnant or breastfeeding, talk with your health care provider first.) Some medicines contain nicotine and some do not. Both types of medicines help with cravings, but the medicines that include nicotine help to relieve withdrawal symptoms. Your health care provider may recommend:  ? Nicotine patches, gum, or lozenges.  ? Nicotine inhalers or sprays.  ? Non-nicotine medicine that is taken by mouth.    Talk with your health care provider about combining strategies, such as taking medicines while you are also receiving in-person counseling. Using these two strategies together makes you more likely to succeed in quitting than if you used either strategy on its own.  If you are pregnant or breastfeeding, talk with your health care provider about finding counseling or other support strategies to quit smoking. Do not take medicine to help you quit smoking unless told to do so by your health care  provider.  What things can I do to make it easier to quit?  Quitting smoking might feel overwhelming at first, but there is a lot that you can do to make it easier. Take these important actions:  · Reach out to your family and friends and ask that they support and encourage you during this time. Call telephone quitlines, reach out to support groups, or work with a counselor for support.  · Ask people who smoke to avoid smoking around you.  · Avoid places that trigger you to smoke, such as bars, parties, or smoke-break areas at work.  · Spend time around people who do not smoke.  · Lessen stress in your life, because stress can be a smoking trigger for some people. To lessen stress, try:  ? Exercising regularly.  ? Deep-breathing exercises.  ? Yoga.  ? Meditating.  ? Performing a body scan. This involves closing your eyes, scanning your body from head to toe, and noticing which parts of your body are particularly tense. Purposefully relax the muscles in those areas.  · Download or purchase mobile phone or tablet apps (applications) that can help you stick to your quit plan by providing reminders, tips, and encouragement. There are many free apps, such as QuitGuide from the CDC (Centers for Disease Control and Prevention). You can find other support for quitting smoking (smoking cessation) through smokefree.gov and other websites.    How will I feel when I quit smoking?  Within the first 24 hours of quitting smoking, you may start to feel some withdrawal symptoms. These symptoms are usually most noticeable 2-3 days after quitting, but they usually do not last beyond 2-3 weeks. Changes or symptoms that you might experience include:  · Mood swings.  · Restlessness, anxiety, or irritation.  · Difficulty concentrating.  · Dizziness.  · Strong cravings for sugary foods in addition to nicotine.  · Mild weight gain.  · Constipation.  · Nausea.  · Coughing or a sore throat.  · Changes in how your medicines work in your  body.  · A depressed mood.  · Difficulty sleeping (insomnia).    After the first 2-3 weeks of quitting, you may start to notice more positive results, such as:  · Improved sense of smell and taste.  · Decreased coughing and sore throat.  · Slower heart rate.  · Lower blood pressure.  · Clearer skin.  · The ability to breathe more easily.  · Fewer sick days.    Quitting smoking is very challenging for most people. Do not get discouraged if you are not successful the first time. Some people need to make many attempts to quit before they achieve long-term success. Do your best to stick to your quit plan, and talk with your health care provider if you have any questions or concerns.  This information is not intended to replace advice given to you by your health care provider. Make sure you discuss any questions you have with your health care provider.  Document Released: 12/12/2002 Document Revised: 08/15/2017 Document Reviewed: 05/03/2016  [employer] Interactive Patient Education © 2018 [employer]  Serving Sizes  A serving size is a measured amount of food or drink, such as one slice of bread, that has an associated nutrient content. Knowing the serving size of a food or drink can help you determine how much of that food you should consume.  What is the size of one serving?  The size of one healthy serving depends on the food or drink. To determine a serving size, read the food label. If the food or drink does not have a food label, try to find serving size information online. Or, use the following to estimate the size of one adult serving:  Grain  1 slice bread. ½ bagel. ½ cup pasta.  Vegetable  ½ cup cooked or canned vegetables. 1 cup raw, leafy greens.  Fruit  ½ cup canned fruit. 1 medium fruit. ¼ cup dried fruit.  Meat and Other Protein Sources  1 oz meat, poultry, or fish. ¼ cup cooked beans. 1 egg. ¼ cup nuts or seeds. 1 Tbsp nut butter. ¼ cup tofu or tempeh. 2 Tbsp hummus.  Dairy  An individual container of  yogurt (6-8 oz). 1 piece of cheese the size of your thumb (1 oz). 1 cup (8 oz) milk or milk alternative.  Fat  A piece the size of one dice. 1 tsp soft margarine. 1 Tbsp mayonnaise. 1 tsp vegetable oil. 1 Tbsp regular salad dressing. 2 Tbsp low-fat salad dressing.  How many servings should I eat from each food group each day?  The following are the suggested number of servings to try and have every day from each food group. You can also look at your eating throughout the week and aim for meeting these requirements on most days for overall healthy eating.  Grain  6-8 servings. Try to have half of your grains from whole grains, such as whole wheat bread, corn tortillas, oatmeal, brown rice, whole wheat pasta, and bulgur.  Vegetable  At least 2½-3 servings.  Fruit  2 servings.  Meat and Other Protein Foods  5-6 servings. Aim to have lean proteins, such as chicken, turkey, fish, beans, or tofu.  Dairy  3 servings. Choose low-fat or nonfat if you are trying to control your weight.  Fat  2-3 servings.  Is a serving the same thing as a portion?  No. A portion is the actual amount you eat, which may be more than one serving. Knowing the specific serving size of a food and the nutritional information that goes with it can help you make a healthy decision on what size portion to eat.  What are some tips to help me learn healthy serving sizes?  · Check food labels for serving sizes. Many foods that come as a single portion actually contain multiple servings.  · Determine the serving size of foods you commonly eat and figure out how large a portion you usually eat.  · Measure the number of servings that can be held by the bowls, glasses, cups, and plates you typically use. For example, pour your breakfast cereal into your regular bowl and then pour it into a measuring cup.  · For 1-2 days, measure the serving sizes of all the foods you eat.  · Practice estimating serving sizes and determining how big your portions should  "be.  This information is not intended to replace advice given to you by your health care provider. Make sure you discuss any questions you have with your health care provider.  Document Released: 09/15/2004 Document Revised: 08/12/2017 Document Reviewed: 03/17/2015  [employer] Interactive Patient Education © 2018 [employer]  Eating Healthy on a Budget  There are many ways to save money at the grocery store and continue to eat healthy. You can be successful if you plan your meals according to your budget, purchase according to your budget and grocery list, and prepare food yourself.  How can I buy more food on a limited budget?  Plan  · Plan meals and snacks according to a grocery list and budget you create.  · Look for recipes where you can cook once and make enough food for two meals.  · Include meals that will \"stretch\" more expensive foods such as stews, casseroles, and stir-seals dishes.  · Make a grocery list and make sure to bring it with you to the store. If you have a smart phone, you could use your phone to create your shopping list.  Purchase  · When grocery shopping, buy only the items on your grocery list and go only to the areas of the store that have the items on your list.  Prepare  · Some meal items can be prepared in advance. Pre-cook on days when you have extra time.  · Make extra food (such as by doubling recipes) and freeze the extras in meal-sized containers or in individual portions for fast meals and snacks.  · Use leftovers in your meal plan for the week.  · Try some meatless meals or try \"no cook\" meals like salads.  · When you come home from the grocery store, wash and prepare your fruits and vegetables so they are ready to use and eat. This will help reduce food waste.  How can I buy more food on a limited budget?  Try these tips the next time you go shopping:  · Buy store brands or generic brands.  · Use coupons only for foods and brands you normally buy. Avoid buying items you wouldn't " "normally buy simply because they are on sale.  · Check online and in newspapers for weekly deals.  · Buy healthy items from the bulk bins when available, such as herbs, spices, flours, pastas, nuts, and dried fruit.  · Buy fruits and vegetables that are in season. Prices are usually lower on in-season produce.  · Compare and contrast different items. You can do this by looking at the unit price on the price tag. Use it to compare different brands and sizes to find out which item is the best deal.  · Choose naturally low-cost healthy items, such as carrots, potatoes, apples, bananas, and oranges. Dried or canned beans are a low-cost protein source.  · Buy in bulk and freeze extra food. Items you can buy in bulk include meats, fish, poultry, frozen fruits, and frozen vegetables.  · Limit the purchase of prepared or \"ready-to-eat\" foods, such as pre-cut fruits and vegetables and pre-made salads.  · If possible, shop around to discover which grocery store offers the best prices. Some stores charge much more than other stores for the same items.  · Do not shop when you are hungry. If you shop while hungry, It may be hard to stick to your list and budget.  · Stick to your list and resist impulse buys. Treat your list as your official plan for the week.  · Buy a variety of vegetables and fruit by purchasing fresh, frozen, and canned items.  · Look beyond eye level. Foods at eye level (adult or child eye level) are more expensive. Look at the top and bottom shelves for deals.  · Be efficient with your time when shopping. The more time you spend at the store, the more money you are likely to spend.  · Consider other retailers such as dollar stores, larger wholesale stores, local fruit and vegetable stands, and farmers markets.    What are some tips for less expensive food substitutions?  When choosing more expensive foods like meats and dairy, try these tips to save money:  · Choose cheaper cuts of meat, such as bone-in " "chicken thighs and drumsticks instead skinless and boneless chicken. When you are ready to prepare the chicken, you can remove the skin yourself to make it healthier.  · Choose lean meats like chicken or turkey. When choosing ground beef, make sure it is lean ground beef (92% lean, 8% fat). If you do buy a fattier ground beef, drain the fat before eating.  · Buy dried beans and peas, such as lentils, split peas, or kidney beans.  · For seafood, choose canned tuna, salmon, or sardines.  · Eggs are a low-cost source of protein.  · Buy the larger tubs of yogurt instead of individual-sized containers.  · Choose water instead of sodas and other sweetened beverages.  · Skip buying chips, cookies, and other \"junk food\". These items are usually expensive, high in calories, and low in nutritional value.    How can I prepare the foods I buy in the healthiest way?  Practice these tips for cooking foods in the healthiest way to reduce excess fat and calorie intake:  · Steam, saute, grill, or bake foods instead of frying them.  · Make sure half your plate is filled with fruits or vegetables. Choose from fresh, frozen, or canned fruits and vegetables. If eating canned, remember to rinse them before eating. This will remove any excess salt added for packaging.  · Trim all fat from meat before cooking. Remove the skin from chicken or turkey.  · Spoon off fat from meat dishes once they have been chilled in the refrigerator and the fat has hardened on the top.  · Use skim milk, low-fat milk, or evaporated skim milk when making cream sauces, soups, or puddings.  · Substitute low-fat yogurt, sour cream, or cottage cheese for sour cream and mayonnaise in dips and dressings.  · Try lemon juice, herbs, or spices to season food instead of salt, butter, or margarine.    This information is not intended to replace advice given to you by your health care provider. Make sure you discuss any questions you have with your health care " provider.  Document Released: 08/21/2015 Document Revised: 07/07/2017 Document Reviewed: 07/21/2015  [employer] Interactive Patient Education © 2018 [employer]

## 2018-07-03 ENCOUNTER — TELEPHONE (OUTPATIENT)
Dept: CARDIOLOGY | Facility: CLINIC | Age: 48
End: 2018-07-03

## 2018-07-03 NOTE — TELEPHONE ENCOUNTER
Sintia called from [facility] and stated that Jaquan called them and advised them that he is allergic to Spironolactone and Diltiazem.  He is breaking out with them.

## 2018-07-10 ENCOUNTER — OFFICE VISIT (OUTPATIENT)
Dept: FAMILY MEDICINE CLINIC | Facility: CLINIC | Age: 48
End: 2018-07-10

## 2018-07-10 VITALS
HEIGHT: 67 IN | BODY MASS INDEX: 31.86 KG/M2 | WEIGHT: 203 LBS | TEMPERATURE: 97.7 F | DIASTOLIC BLOOD PRESSURE: 90 MMHG | OXYGEN SATURATION: 98 % | SYSTOLIC BLOOD PRESSURE: 140 MMHG | HEART RATE: 93 BPM

## 2018-07-10 DIAGNOSIS — F41.1 GENERALIZED ANXIETY DISORDER: ICD-10-CM

## 2018-07-10 PROCEDURE — 99214 OFFICE O/P EST MOD 30 MIN: CPT | Performed by: NURSE PRACTITIONER

## 2018-07-10 RX ORDER — CLONAZEPAM 0.5 MG/1
0.5 TABLET ORAL DAILY PRN
Qty: 10 TABLET | Refills: 0 | Status: SHIPPED | OUTPATIENT
Start: 2018-07-10 | End: 2018-07-10 | Stop reason: SDUPTHER

## 2018-07-10 RX ORDER — CLONAZEPAM 0.5 MG/1
0.5 TABLET ORAL DAILY PRN
Qty: 20 TABLET | Refills: 0 | Status: SHIPPED | OUTPATIENT
Start: 2018-07-10 | End: 2018-07-31 | Stop reason: SDUPTHER

## 2018-07-10 NOTE — PATIENT INSTRUCTIONS
Serving Sizes  A serving size is a measured amount of food or drink, such as one slice of bread, that has an associated nutrient content. Knowing the serving size of a food or drink can help you determine how much of that food you should consume.  What is the size of one serving?  The size of one healthy serving depends on the food or drink. To determine a serving size, read the food label. If the food or drink does not have a food label, try to find serving size information online. Or, use the following to estimate the size of one adult serving:  Grain  1 slice bread. ½ bagel. ½ cup pasta.  Vegetable  ½ cup cooked or canned vegetables. 1 cup raw, leafy greens.  Fruit  ½ cup canned fruit. 1 medium fruit. ¼ cup dried fruit.  Meat and Other Protein Sources  1 oz meat, poultry, or fish. ¼ cup cooked beans. 1 egg. ¼ cup nuts or seeds. 1 Tbsp nut butter. ¼ cup tofu or tempeh. 2 Tbsp hummus.  Dairy  An individual container of yogurt (6-8 oz). 1 piece of cheese the size of your thumb (1 oz). 1 cup (8 oz) milk or milk alternative.  Fat  A piece the size of one dice. 1 tsp soft margarine. 1 Tbsp mayonnaise. 1 tsp vegetable oil. 1 Tbsp regular salad dressing. 2 Tbsp low-fat salad dressing.  How many servings should I eat from each food group each day?  The following are the suggested number of servings to try and have every day from each food group. You can also look at your eating throughout the week and aim for meeting these requirements on most days for overall healthy eating.  Grain  6-8 servings. Try to have half of your grains from whole grains, such as whole wheat bread, corn tortillas, oatmeal, brown rice, whole wheat pasta, and bulgur.  Vegetable  At least 2½-3 servings.  Fruit  2 servings.  Meat and Other Protein Foods  5-6 servings. Aim to have lean proteins, such as chicken, turkey, fish, beans, or tofu.  Dairy  3 servings. Choose low-fat or nonfat if you are trying to control your weight.  Fat  2-3 servings.  Is  a serving the same thing as a portion?  No. A portion is the actual amount you eat, which may be more than one serving. Knowing the specific serving size of a food and the nutritional information that goes with it can help you make a healthy decision on what size portion to eat.  What are some tips to help me learn healthy serving sizes?  · Check food labels for serving sizes. Many foods that come as a single portion actually contain multiple servings.  · Determine the serving size of foods you commonly eat and figure out how large a portion you usually eat.  · Measure the number of servings that can be held by the bowls, glasses, cups, and plates you typically use. For example, pour your breakfast cereal into your regular bowl and then pour it into a measuring cup.  · For 1-2 days, measure the serving sizes of all the foods you eat.  · Practice estimating serving sizes and determining how big your portions should be.  This information is not intended to replace advice given to you by your health care provider. Make sure you discuss any questions you have with your health care provider.  Document Released: 09/15/2004 Document Revised: 08/12/2017 Document Reviewed: 03/17/2015  [employer] Interactive Patient Education © 2018 [employer]  Steps to Quit Smoking  Smoking tobacco can be harmful to your health and can affect almost every organ in your body. Smoking puts you, and those around you, at risk for developing many serious chronic diseases. Quitting smoking is difficult, but it is one of the best things that you can do for your health. It is never too late to quit.  What are the benefits of quitting smoking?  When you quit smoking, you lower your risk of developing serious diseases and conditions, such as:  · Lung cancer or lung disease, such as COPD.  · Heart disease.  · Stroke.  · Heart attack.  · Infertility.  · Osteoporosis and bone fractures.    Additionally, symptoms such as coughing, wheezing, and shortness  of breath may get better when you quit. You may also find that you get sick less often because your body is stronger at fighting off colds and infections. If you are pregnant, quitting smoking can help to reduce your chances of having a baby of low birth weight.  How do I get ready to quit?  When you decide to quit smoking, create a plan to make sure that you are successful. Before you quit:  · Pick a date to quit. Set a date within the next two weeks to give you time to prepare.  · Write down the reasons why you are quitting. Keep this list in places where you will see it often, such as on your bathroom mirror or in your car or wallet.  · Identify the people, places, things, and activities that make you want to smoke (triggers) and avoid them. Make sure to take these actions:  ? Throw away all cigarettes at home, at work, and in your car.  ? Throw away smoking accessories, such as ashtrays and lighters.  ? Clean your car and make sure to empty the ashtray.  ? Clean your home, including curtains and carpets.  · Tell your family, friends, and coworkers that you are quitting. Support from your loved ones can make quitting easier.  · Talk with your health care provider about your options for quitting smoking.  · Find out what treatment options are covered by your health insurance.    What strategies can I use to quit smoking?  Talk with your healthcare provider about different strategies to quit smoking. Some strategies include:  · Quitting smoking altogether instead of gradually lessening how much you smoke over a period of time. Research shows that quitting “cold turkey” is more successful than gradually quitting.  · Attending in-person counseling to help you build problem-solving skills. You are more likely to have success in quitting if you attend several counseling sessions. Even short sessions of 10 minutes can be effective.  · Finding resources and support systems that can help you to quit smoking and remain  smoke-free after you quit. These resources are most helpful when you use them often. They can include:  ? Online chats with a counselor.  ? Telephone quitlines.  ? Printed self-help materials.  ? Support groups or group counseling.  ? Text messaging programs.  ? Mobile phone applications.  · Taking medicines to help you quit smoking. (If you are pregnant or breastfeeding, talk with your health care provider first.) Some medicines contain nicotine and some do not. Both types of medicines help with cravings, but the medicines that include nicotine help to relieve withdrawal symptoms. Your health care provider may recommend:  ? Nicotine patches, gum, or lozenges.  ? Nicotine inhalers or sprays.  ? Non-nicotine medicine that is taken by mouth.    Talk with your health care provider about combining strategies, such as taking medicines while you are also receiving in-person counseling. Using these two strategies together makes you more likely to succeed in quitting than if you used either strategy on its own.  If you are pregnant or breastfeeding, talk with your health care provider about finding counseling or other support strategies to quit smoking. Do not take medicine to help you quit smoking unless told to do so by your health care provider.  What things can I do to make it easier to quit?  Quitting smoking might feel overwhelming at first, but there is a lot that you can do to make it easier. Take these important actions:  · Reach out to your family and friends and ask that they support and encourage you during this time. Call telephone quitlines, reach out to support groups, or work with a counselor for support.  · Ask people who smoke to avoid smoking around you.  · Avoid places that trigger you to smoke, such as bars, parties, or smoke-break areas at work.  · Spend time around people who do not smoke.  · Lessen stress in your life, because stress can be a smoking trigger for some people. To lessen stress,  try:  ? Exercising regularly.  ? Deep-breathing exercises.  ? Yoga.  ? Meditating.  ? Performing a body scan. This involves closing your eyes, scanning your body from head to toe, and noticing which parts of your body are particularly tense. Purposefully relax the muscles in those areas.  · Download or purchase mobile phone or tablet apps (applications) that can help you stick to your quit plan by providing reminders, tips, and encouragement. There are many free apps, such as QuitGuide from the CDC (Centers for Disease Control and Prevention). You can find other support for quitting smoking (smoking cessation) through smokefree.gov and other websites.    How will I feel when I quit smoking?  Within the first 24 hours of quitting smoking, you may start to feel some withdrawal symptoms. These symptoms are usually most noticeable 2-3 days after quitting, but they usually do not last beyond 2-3 weeks. Changes or symptoms that you might experience include:  · Mood swings.  · Restlessness, anxiety, or irritation.  · Difficulty concentrating.  · Dizziness.  · Strong cravings for sugary foods in addition to nicotine.  · Mild weight gain.  · Constipation.  · Nausea.  · Coughing or a sore throat.  · Changes in how your medicines work in your body.  · A depressed mood.  · Difficulty sleeping (insomnia).    After the first 2-3 weeks of quitting, you may start to notice more positive results, such as:  · Improved sense of smell and taste.  · Decreased coughing and sore throat.  · Slower heart rate.  · Lower blood pressure.  · Clearer skin.  · The ability to breathe more easily.  · Fewer sick days.    Quitting smoking is very challenging for most people. Do not get discouraged if you are not successful the first time. Some people need to make many attempts to quit before they achieve long-term success. Do your best to stick to your quit plan, and talk with your health care provider if you have any questions or concerns.  This  information is not intended to replace advice given to you by your health care provider. Make sure you discuss any questions you have with your health care provider.  Document Released: 12/12/2002 Document Revised: 08/15/2017 Document Reviewed: 05/03/2016  [employer] Interactive Patient Education © 2018 [employer]  Stress and Stress Management  Stress is a normal reaction to life events. It is what you feel when life demands more than you are used to or more than you can handle. Some stress can be useful. For example, the stress reaction can help you catch the last bus of the day, study for a test, or meet a deadline at work. But stress that occurs too often or for too long can cause problems. It can affect your emotional health and interfere with relationships and normal daily activities. Too much stress can weaken your immune system and increase your risk for physical illness. If you already have a medical problem, stress can make it worse.  What are the causes?  All sorts of life events may cause stress. An event that causes stress for one person may not be stressful for another person. Major life events commonly cause stress. These may be positive or negative. Examples include losing your job, moving into a new home, getting , having a baby, or losing a loved one. Less obvious life events may also cause stress, especially if they occur day after day or in combination. Examples include working long hours, driving in traffic, caring for children, being in debt, or being in a difficult relationship.  What are the signs or symptoms?  Stress may cause emotional symptoms including, the following:  · Anxiety. This is feeling worried, afraid, on edge, overwhelmed, or out of control.  · Anger. This is feeling irritated or impatient.  · Depression. This is feeling sad, down, helpless, or guilty.  · Difficulty focusing, remembering, or making decisions.    Stress may cause physical symptoms, including the  following:  · Aches and pains. These may affect your head, neck, back, stomach, or other areas of your body.  · Tight muscles or clenched jaw.  · Low energy or trouble sleeping.    Stress may cause unhealthy behaviors, including the following:  · Eating to feel better (overeating) or skipping meals.  · Sleeping too little, too much, or both.  · Working too much or putting off tasks (procrastination).  · Smoking, drinking alcohol, or using drugs to feel better.    How is this diagnosed?  Stress is diagnosed through an assessment by your health care provider. Your health care provider will ask questions about your symptoms and any stressful life events. Your health care provider will also ask about your medical history and may order blood tests or other tests. Certain medical conditions and medicine can cause physical symptoms similar to stress. Mental illness can cause emotional symptoms and unhealthy behaviors similar to stress. Your health care provider may refer you to a mental health professional for further evaluation.  How is this treated?  Stress management is the recommended treatment for stress. The goals of stress management are reducing stressful life events and coping with stress in healthy ways.  Techniques for reducing stressful life events include the following:  · Stress identification. Self-monitor for stress and identify what causes stress for you. These skills may help you to avoid some stressful events.  · Time management. Set your priorities, keep a calendar of events, and learn to say “no.” These tools can help you avoid making too many commitments.    Techniques for coping with stress include the following:  · Rethinking the problem. Try to think realistically about stressful events rather than ignoring them or overreacting. Try to find the positives in a stressful situation rather than focusing on the negatives.  · Exercise. Physical exercise can release both physical and emotional tension.  The key is to find a form of exercise you enjoy and do it regularly.  · Relaxation techniques. These relax the body and mind. Examples include yoga, meditation, tavo chi, biofeedback, deep breathing, progressive muscle relaxation, listening to music, being out in nature, journaling, and other hobbies. Again, the key is to find one or more that you enjoy and can do regularly.  · Healthy lifestyle. Eat a balanced diet, get plenty of sleep, and do not smoke. Avoid using alcohol or drugs to relax.  · Strong support network. Spend time with family, friends, or other people you enjoy being around. Express your feelings and talk things over with someone you trust.    Counseling or talktherapy with a mental health professional may be helpful if you are having difficulty managing stress on your own. Medicine is typically not recommended for the treatment of stress. Talk to your health care provider if you think you need medicine for symptoms of stress.  Follow these instructions at home:  · Keep all follow-up visits as directed by your health care provider.  · Take all medicines as directed by your health care provider.  Contact a health care provider if:  · Your symptoms get worse or you start having new symptoms.  · You feel overwhelmed by your problems and can no longer manage them on your own.  Get help right away if:  · You feel like hurting yourself or someone else.  This information is not intended to replace advice given to you by your health care provider. Make sure you discuss any questions you have with your health care provider.  Document Released: 06/13/2002 Document Revised: 05/25/2017 Document Reviewed: 08/12/2014  [employer] Interactive Patient Education © 2017 [employer]

## 2018-07-10 NOTE — PROGRESS NOTES
"Subjective   Jaquan Mckay is a 47 y.o. male.     Chief Complaint   Patient presents with   • Anxiety   • Depression   • Hypertension       History of Present Illness     HTN-more stable today.  Reports he did have some CP on Sunday but has an upcoming appt with cardio and may have a cardiac cath if he continues to have CP.  Ongoing.    Anxiety-Stable.  No increase but is not improved.  Has been to [facility] since his last visit.  He will have an upcoming court date and request a letter with his diagnosis on it for his .  Not at goal.   Pain-he reports he did not get Norco 10 because he had a RX for 7.5 mg at the pharmacy and they filled that.  His RX is on hold at the pharmacy.  Right elbow pain-reports he has noted a \"knot\" near his elbow.  He reports generalized ROM is uncomfortable.  He did have a fall in the spring and fell down onto that elbow.  He has had a brace in the past but reports It is too big now.      The following portions of the patient's history were reviewed and updated as appropriate: allergies, current medications, past family history, past medical history, past social history, past surgical history and problem list.    Review of Systems   Constitutional: Positive for appetite change and fatigue. Negative for unexpected weight change.   HENT: Negative for congestion, ear pain, nosebleeds, postnasal drip, rhinorrhea, sinus pain, sinus pressure, sore throat, trouble swallowing and voice change.    Eyes: Negative for pain and visual disturbance.   Respiratory: Positive for cough. Negative for chest tightness, shortness of breath and wheezing.    Cardiovascular: Positive for chest pain. Negative for palpitations.   Gastrointestinal: Negative for abdominal pain, constipation, diarrhea and vomiting.   Endocrine: Negative for cold intolerance and polydipsia.   Genitourinary: Negative for difficulty urinating, dysuria, flank pain and hematuria.   Musculoskeletal: Positive for arthralgias and back " "pain. Negative for gait problem, joint swelling and myalgias.   Skin: Negative for color change and rash.   Allergic/Immunologic: Negative.    Neurological: Positive for dizziness, weakness, numbness and headaches. Negative for seizures and syncope.   Hematological: Negative.    Psychiatric/Behavioral: Positive for agitation, decreased concentration, dysphoric mood and sleep disturbance. Negative for suicidal ideas. The patient is nervous/anxious.    All other systems reviewed and are negative.      Objective     /90   Pulse 93   Temp 97.7 °F (36.5 °C) (Temporal Artery )   Ht 170.2 cm (67\")   Wt 92.1 kg (203 lb)   SpO2 98%   BMI 31.79 kg/m²     Physical Exam   Constitutional: He is oriented to person, place, and time. He appears well-developed and well-nourished.   HENT:   Head: Normocephalic and atraumatic.   Right Ear: External ear and ear canal normal. Tympanic membrane is not erythematous.   Left Ear: External ear and ear canal normal. Tympanic membrane is not erythematous.   Nose: Mucosal edema and rhinorrhea present.   Mouth/Throat: No oropharyngeal exudate or posterior oropharyngeal erythema.   Eyes: Conjunctivae and EOM are normal. Pupils are equal, round, and reactive to light. No scleral icterus.   Neck: Normal range of motion. Neck supple. No JVD present. No thyromegaly present.   Cardiovascular: Normal rate, regular rhythm and normal heart sounds.    No murmur heard.  Pulmonary/Chest: Effort normal. No respiratory distress. He has no decreased breath sounds. He has no wheezes. He exhibits no tenderness.   Abdominal: Soft. Bowel sounds are normal. He exhibits no mass. There is no tenderness.   Musculoskeletal: He exhibits tenderness. He exhibits no edema.        Right elbow: Tenderness found. Medial epicondyle and olecranon process tenderness noted.        Left knee: He exhibits decreased range of motion and swelling (mild). He exhibits no ecchymosis. Tenderness found. Medial joint line and " lateral joint line tenderness noted.        Lumbar back: He exhibits decreased range of motion, tenderness, pain and spasm. He exhibits no swelling.        Right hand: He exhibits tenderness (3rd digit). He exhibits normal capillary refill.     Skin Integrity  -  His right foot skin is intact.His left foot skin is intact..  Lymphadenopathy:        Head (right side): No submandibular adenopathy present.        Head (left side): No submandibular adenopathy present.     He has no cervical adenopathy.   Neurological: He is alert and oriented to person, place, and time. He has normal reflexes. No cranial nerve deficit. He exhibits normal muscle tone. Coordination normal.   Skin: Skin is warm and dry. Capillary refill takes less than 2 seconds. Ecchymosis (bilateral Tib/Fib area) noted.   Psychiatric: His speech is normal. Judgment and thought content normal. His mood appears anxious. He is slowed. He is not actively hallucinating. Cognition and memory are normal. He exhibits a depressed mood. He expresses no homicidal and no suicidal ideation. He exhibits normal recent memory and normal remote memory.   Crying and tearful during exam.  Flat affect He is attentive.   Vitals reviewed.      Assessment/Plan     Problem List Items Addressed This Visit     None      Visit Diagnoses     Generalized anxiety disorder        Relevant Medications    clonazePAM (KlonoPIN) 0.5 MG tablet    Other Relevant Orders    Urine Drug Screen - Urine, Clean Catch          Patient's Body mass index is 31.79 kg/m². BMI is above normal parameters. Recommendations include: no follow-up required.   (Normal BMI:  18.5-24.9, Overweight 25-29.9, Obesity 30 or greater)  I advised Jaquan of the risks of continuing to use tobacco, and I provided him with tobacco cessation educational materials in the After Visit Summary.   During this visit, I spent less than 3 minutes counseling the patient regarding tobacco cessation.    Understands disease processes  and need for medications.  Understands reasons for urgent and emergent care.  Patient (& family) verbalized agreement for treatment plan.   Emotional support and active listening provided.  Patient provided time to verbalize feelings.  Stress reduction advised (examples:  make time for self, Reading, Listening to music, Exercise, keeping a journal, venting to a confidant, etc.)  CHAVEZ reviewed today and consistent.  Will refill prescribed controlled medication today.  Patient is aware they cannot receive narcotics from any other provider except if under care of pain management or speciality clinic.  Risk and benefits of medication use has been reviewed.  History and physical exam exhibit continued safe and appropriate use of controlled substances.  UDS requested per [facility] while patient in office today.  Buccal swab or blood sample will be obtained if patient is unable to provide specimen.   RTC 1 month, sooner if needed.           This document has been electronically signed by:  BALDOMERO Thao, YESY-C

## 2018-07-20 ENCOUNTER — HOSPITAL ENCOUNTER (OUTPATIENT)
Dept: RESPIRATORY THERAPY | Facility: HOSPITAL | Age: 48
Discharge: HOME | End: 2018-07-20
Attending: INTERNAL MEDICINE | Admitting: INTERNAL MEDICINE

## 2018-07-20 VITALS — HEART RATE: 73 BPM | OXYGEN SATURATION: 99 % | RESPIRATION RATE: 15 BRPM

## 2018-07-20 DIAGNOSIS — R06.02 SOB (SHORTNESS OF BREATH): ICD-10-CM

## 2018-07-20 PROCEDURE — 94727 GAS DIL/WSHOT DETER LNG VOL: CPT

## 2018-07-20 PROCEDURE — 94640 AIRWAY INHALATION TREATMENT: CPT

## 2018-07-20 PROCEDURE — 94729 DIFFUSING CAPACITY: CPT

## 2018-07-20 PROCEDURE — 94727 GAS DIL/WSHOT DETER LNG VOL: CPT | Performed by: INTERNAL MEDICINE

## 2018-07-20 PROCEDURE — 94060 EVALUATION OF WHEEZING: CPT | Performed by: INTERNAL MEDICINE

## 2018-07-20 PROCEDURE — 94799 UNLISTED PULMONARY SVC/PX: CPT

## 2018-07-20 PROCEDURE — 94060 EVALUATION OF WHEEZING: CPT

## 2018-07-20 PROCEDURE — 94729 DIFFUSING CAPACITY: CPT | Performed by: INTERNAL MEDICINE

## 2018-07-20 RX ORDER — ALBUTEROL SULFATE 2.5 MG/3ML
2.5 SOLUTION RESPIRATORY (INHALATION) ONCE
Status: COMPLETED | OUTPATIENT
Start: 2018-07-20 | End: 2018-07-20

## 2018-07-20 RX ADMIN — ALBUTEROL SULFATE 2.5 MG: 2.5 SOLUTION RESPIRATORY (INHALATION) at 13:50

## 2018-07-25 ENCOUNTER — HOSPITAL ENCOUNTER (EMERGENCY)
Facility: HOSPITAL | Age: 48
Discharge: HOME | End: 2018-07-25
Attending: EMERGENCY MEDICINE | Admitting: EMERGENCY MEDICINE

## 2018-07-25 ENCOUNTER — APPOINTMENT (OUTPATIENT)
Dept: CT IMAGING | Facility: HOSPITAL | Age: 48
End: 2018-07-25

## 2018-07-25 ENCOUNTER — APPOINTMENT (OUTPATIENT)
Dept: GENERAL RADIOLOGY | Facility: HOSPITAL | Age: 48
End: 2018-07-25

## 2018-07-25 VITALS
OXYGEN SATURATION: 100 % | BODY MASS INDEX: 31.34 KG/M2 | TEMPERATURE: 98.4 F | HEIGHT: 66 IN | DIASTOLIC BLOOD PRESSURE: 96 MMHG | RESPIRATION RATE: 16 BRPM | HEART RATE: 98 BPM | WEIGHT: 195 LBS | SYSTOLIC BLOOD PRESSURE: 130 MMHG

## 2018-07-25 DIAGNOSIS — F32.A DEPRESSION, UNSPECIFIED DEPRESSION TYPE: ICD-10-CM

## 2018-07-25 DIAGNOSIS — R07.9 CHEST PAIN, UNSPECIFIED TYPE: Primary | ICD-10-CM

## 2018-07-25 LAB
6-ACETYL MORPHINE: NEGATIVE
ALBUMIN SERPL-MCNC: 4.6 G/DL (ref 3.5–5)
ALBUMIN/GLOB SERPL: 1.2 G/DL (ref 1.5–2.5)
ALP SERPL-CCNC: 92 U/L (ref 40–129)
ALT SERPL W P-5'-P-CCNC: 32 U/L (ref 10–44)
AMPHET+METHAMPHET UR QL: NEGATIVE
ANION GAP SERPL CALCULATED.3IONS-SCNC: 10.9 MMOL/L (ref 3.6–11.2)
AST SERPL-CCNC: 37 U/L (ref 10–34)
BACTERIA UR QL AUTO: ABNORMAL /HPF
BARBITURATES UR QL SCN: NEGATIVE
BASOPHILS # BLD AUTO: 0.02 10*3/MM3 (ref 0–0.3)
BASOPHILS NFR BLD AUTO: 0.2 % (ref 0–2)
BENZODIAZ UR QL SCN: NEGATIVE
BILIRUB SERPL-MCNC: 0.9 MG/DL (ref 0.2–1.8)
BILIRUB UR QL STRIP: ABNORMAL
BNP SERPL-MCNC: 6 PG/ML (ref 0–100)
BUN BLD-MCNC: 12 MG/DL (ref 7–21)
BUN/CREAT SERPL: 9.5 (ref 7–25)
BUPRENORPHINE SERPL-MCNC: NEGATIVE NG/ML
CALCIUM SPEC-SCNC: 9.8 MG/DL (ref 7.7–10)
CANNABINOIDS SERPL QL: NEGATIVE
CHLORIDE SERPL-SCNC: 105 MMOL/L (ref 99–112)
CK MB SERPL-CCNC: 2.14 NG/ML (ref 0–5)
CK MB SERPL-RTO: 1.3 % (ref 0–3)
CK SERPL-CCNC: 171 U/L (ref 24–204)
CLARITY UR: CLEAR
CO2 SERPL-SCNC: 23.1 MMOL/L (ref 24.3–31.9)
COCAINE UR QL: NEGATIVE
COLOR UR: ABNORMAL
CREAT BLD-MCNC: 1.26 MG/DL (ref 0.43–1.29)
DEPRECATED RDW RBC AUTO: 43.5 FL (ref 37–54)
EOSINOPHIL # BLD AUTO: 0.12 10*3/MM3 (ref 0–0.7)
EOSINOPHIL NFR BLD AUTO: 1.2 % (ref 0–5)
ERYTHROCYTE [DISTWIDTH] IN BLOOD BY AUTOMATED COUNT: 13.4 % (ref 11.5–14.5)
GFR SERPL CREATININE-BSD FRML MDRD: 62 ML/MIN/1.73
GLOBULIN UR ELPH-MCNC: 4 GM/DL
GLUCOSE BLD-MCNC: 114 MG/DL (ref 70–110)
GLUCOSE BLDC GLUCOMTR-MCNC: 139 MG/DL (ref 70–130)
GLUCOSE UR STRIP-MCNC: NEGATIVE MG/DL
HCT VFR BLD AUTO: 47.8 % (ref 42–52)
HGB BLD-MCNC: 17.3 G/DL (ref 14–18)
HGB UR QL STRIP.AUTO: NEGATIVE
HYALINE CASTS UR QL AUTO: ABNORMAL /LPF
IMM GRANULOCYTES # BLD: 0.02 10*3/MM3 (ref 0–0.03)
IMM GRANULOCYTES NFR BLD: 0.2 % (ref 0–0.5)
KETONES UR QL STRIP: ABNORMAL
LEUKOCYTE ESTERASE UR QL STRIP.AUTO: ABNORMAL
LYMPHOCYTES # BLD AUTO: 2.3 10*3/MM3 (ref 1–3)
LYMPHOCYTES NFR BLD AUTO: 23.8 % (ref 21–51)
MCH RBC QN AUTO: 33.5 PG (ref 27–33)
MCHC RBC AUTO-ENTMCNC: 36.2 G/DL (ref 33–37)
MCV RBC AUTO: 92.5 FL (ref 80–94)
METHADONE UR QL SCN: NEGATIVE
MONOCYTES # BLD AUTO: 1.14 10*3/MM3 (ref 0.1–0.9)
MONOCYTES NFR BLD AUTO: 11.8 % (ref 0–10)
NEUTROPHILS # BLD AUTO: 6.08 10*3/MM3 (ref 1.4–6.5)
NEUTROPHILS NFR BLD AUTO: 62.8 % (ref 30–70)
NITRITE UR QL STRIP: NEGATIVE
OPIATES UR QL: NEGATIVE
OSMOLALITY SERPL CALC.SUM OF ELEC: 278.2 MOSM/KG (ref 273–305)
OXYCODONE UR QL SCN: NEGATIVE
PCP UR QL SCN: NEGATIVE
PH UR STRIP.AUTO: 6 [PH] (ref 5–8)
PLATELET # BLD AUTO: 203 10*3/MM3 (ref 130–400)
PMV BLD AUTO: 10.9 FL (ref 6–10)
POTASSIUM BLD-SCNC: 4 MMOL/L (ref 3.5–5.3)
PROT SERPL-MCNC: 8.6 G/DL (ref 6–8)
PROT UR QL STRIP: ABNORMAL
RBC # BLD AUTO: 5.17 10*6/MM3 (ref 4.7–6.1)
RBC # UR: ABNORMAL /HPF
REF LAB TEST METHOD: ABNORMAL
SODIUM BLD-SCNC: 139 MMOL/L (ref 135–153)
SP GR UR STRIP: >1.03 (ref 1–1.03)
SQUAMOUS #/AREA URNS HPF: ABNORMAL /HPF
TROPONIN I SERPL-MCNC: <0.006 NG/ML
TROPONIN I SERPL-MCNC: <0.006 NG/ML
UROBILINOGEN UR QL STRIP: ABNORMAL
WBC NRBC COR # BLD: 9.68 10*3/MM3 (ref 4.5–12.5)
WBC UR QL AUTO: ABNORMAL /HPF

## 2018-07-25 PROCEDURE — 93005 ELECTROCARDIOGRAM TRACING: CPT | Performed by: PHYSICIAN ASSISTANT

## 2018-07-25 PROCEDURE — 80307 DRUG TEST PRSMV CHEM ANLYZR: CPT | Performed by: PHYSICIAN ASSISTANT

## 2018-07-25 PROCEDURE — 80053 COMPREHEN METABOLIC PANEL: CPT | Performed by: PHYSICIAN ASSISTANT

## 2018-07-25 PROCEDURE — 82553 CREATINE MB FRACTION: CPT | Performed by: PHYSICIAN ASSISTANT

## 2018-07-25 PROCEDURE — 70450 CT HEAD/BRAIN W/O DYE: CPT | Performed by: RADIOLOGY

## 2018-07-25 PROCEDURE — 85025 COMPLETE CBC W/AUTO DIFF WBC: CPT | Performed by: PHYSICIAN ASSISTANT

## 2018-07-25 PROCEDURE — 81001 URINALYSIS AUTO W/SCOPE: CPT | Performed by: PHYSICIAN ASSISTANT

## 2018-07-25 PROCEDURE — 83880 ASSAY OF NATRIURETIC PEPTIDE: CPT | Performed by: PHYSICIAN ASSISTANT

## 2018-07-25 PROCEDURE — 93010 ELECTROCARDIOGRAM REPORT: CPT | Performed by: INTERNAL MEDICINE

## 2018-07-25 PROCEDURE — 70450 CT HEAD/BRAIN W/O DYE: CPT

## 2018-07-25 PROCEDURE — 71046 X-RAY EXAM CHEST 2 VIEWS: CPT

## 2018-07-25 PROCEDURE — 99284 EMERGENCY DEPT VISIT MOD MDM: CPT

## 2018-07-25 PROCEDURE — 84484 ASSAY OF TROPONIN QUANT: CPT | Performed by: PHYSICIAN ASSISTANT

## 2018-07-25 PROCEDURE — 82962 GLUCOSE BLOOD TEST: CPT

## 2018-07-25 PROCEDURE — 71046 X-RAY EXAM CHEST 2 VIEWS: CPT | Performed by: RADIOLOGY

## 2018-07-25 PROCEDURE — 82550 ASSAY OF CK (CPK): CPT | Performed by: PHYSICIAN ASSISTANT

## 2018-07-25 RX ORDER — SODIUM CHLORIDE 0.9 % (FLUSH) 0.9 %
10 SYRINGE (ML) INJECTION AS NEEDED
Status: DISCONTINUED | OUTPATIENT
Start: 2018-07-25 | End: 2018-07-25

## 2018-07-25 RX ADMIN — SODIUM CHLORIDE 1000 ML: 9 INJECTION, SOLUTION INTRAVENOUS at 05:36

## 2018-07-31 ENCOUNTER — OFFICE VISIT (OUTPATIENT)
Dept: FAMILY MEDICINE CLINIC | Facility: CLINIC | Age: 48
End: 2018-07-31

## 2018-07-31 VITALS
HEART RATE: 90 BPM | OXYGEN SATURATION: 98 % | SYSTOLIC BLOOD PRESSURE: 150 MMHG | WEIGHT: 196 LBS | DIASTOLIC BLOOD PRESSURE: 90 MMHG | HEIGHT: 66 IN | BODY MASS INDEX: 31.5 KG/M2 | TEMPERATURE: 97.7 F

## 2018-07-31 DIAGNOSIS — M25.521 CHRONIC ELBOW PAIN, RIGHT: Primary | ICD-10-CM

## 2018-07-31 DIAGNOSIS — F41.1 GENERALIZED ANXIETY DISORDER: ICD-10-CM

## 2018-07-31 DIAGNOSIS — M54.42 CHRONIC BILATERAL LOW BACK PAIN WITH LEFT-SIDED SCIATICA: ICD-10-CM

## 2018-07-31 DIAGNOSIS — M62.838 MUSCLE SPASM OF BOTH LOWER LEGS: ICD-10-CM

## 2018-07-31 DIAGNOSIS — G89.29 CHRONIC ELBOW PAIN, RIGHT: Primary | ICD-10-CM

## 2018-07-31 DIAGNOSIS — M76.61 ACHILLES TENDINITIS OF BOTH LOWER EXTREMITIES: ICD-10-CM

## 2018-07-31 DIAGNOSIS — M76.62 ACHILLES TENDINITIS OF BOTH LOWER EXTREMITIES: ICD-10-CM

## 2018-07-31 DIAGNOSIS — M94.262 CHONDROMALACIA OF LEFT KNEE: ICD-10-CM

## 2018-07-31 DIAGNOSIS — G89.29 CHRONIC BILATERAL LOW BACK PAIN WITH LEFT-SIDED SCIATICA: ICD-10-CM

## 2018-07-31 PROCEDURE — 99214 OFFICE O/P EST MOD 30 MIN: CPT | Performed by: NURSE PRACTITIONER

## 2018-07-31 RX ORDER — CYCLOBENZAPRINE HCL 10 MG
10 TABLET ORAL 2 TIMES DAILY PRN
Qty: 60 TABLET | Refills: 5 | Status: SHIPPED | OUTPATIENT
Start: 2018-07-31 | End: 2018-12-07 | Stop reason: SDUPTHER

## 2018-07-31 RX ORDER — CLONAZEPAM 0.5 MG/1
0.5 TABLET ORAL DAILY PRN
Qty: 15 TABLET | Refills: 0 | Status: SHIPPED | OUTPATIENT
Start: 2018-07-31 | End: 2018-08-31 | Stop reason: SDUPTHER

## 2018-07-31 RX ORDER — HYDROCODONE BITARTRATE AND ACETAMINOPHEN 10; 325 MG/1; MG/1
1 TABLET ORAL 2 TIMES DAILY PRN
Qty: 60 TABLET | Refills: 0 | Status: SHIPPED | OUTPATIENT
Start: 2018-07-31 | End: 2018-08-31 | Stop reason: SDUPTHER

## 2018-07-31 NOTE — PROGRESS NOTES
"Subjective   Jaquan Mckay is a 47 y.o. male.     Chief Complaint   Patient presents with   • Anxiety   • Arthritis   • Follow up ER [facility]       History of Present Illness     [facility] follow up-ED follow up for elevated BP, CP and questionable syncope.  He reports he was sitting on the side of bed taking off his shoes and he fainted and hit his head on the wall.   He reports he had 3 episodes in a short period of time.  CT of head was normal.  CXR was normal.   Troponin was negative.    Fecal incontinence-reports he had an episode on 7/25 after leaving the ED.  He reports he did not have a ride home so he was walking home when it occurred.  Stool was \"diarrhea\".  He reports he had an episode also approx 2 weeks prior of which he could not hold stool.  He reports he has noted some constipation at times and does skip 3-4 days without any BM.  He reports he passed formed and diarrhea stool on the episode 2 weeks ago.      The following portions of the patient's history were reviewed and updated as appropriate: allergies, current medications, past family history, past medical history, past social history, past surgical history and problem list.    Review of Systems   Constitutional: Positive for appetite change and fatigue. Negative for unexpected weight change.   HENT: Negative for congestion, ear pain, nosebleeds, postnasal drip, rhinorrhea, sinus pain, sinus pressure, sore throat, trouble swallowing and voice change.    Eyes: Negative for pain and visual disturbance.   Respiratory: Positive for cough. Negative for chest tightness, shortness of breath and wheezing.    Cardiovascular: Positive for chest pain. Negative for palpitations.   Gastrointestinal: Negative for abdominal pain, constipation, diarrhea and vomiting.   Endocrine: Negative for cold intolerance and polydipsia.   Genitourinary: Negative for difficulty urinating, dysuria, flank pain and hematuria.   Musculoskeletal: Positive for arthralgias and back pain. " "Negative for gait problem, joint swelling and myalgias.   Skin: Negative for color change and rash.   Allergic/Immunologic: Negative.    Neurological: Positive for dizziness, weakness, numbness and headaches. Negative for seizures and syncope.   Hematological: Negative.    Psychiatric/Behavioral: Positive for agitation, decreased concentration, dysphoric mood and sleep disturbance. Negative for suicidal ideas. The patient is nervous/anxious.    All other systems reviewed and are negative.      Objective     /90   Pulse 90   Temp 97.7 °F (36.5 °C) (Tympanic)   Ht 167.6 cm (66\")   Wt 88.9 kg (196 lb)   SpO2 98%   BMI 31.64 kg/m²     Physical Exam   Constitutional: He is oriented to person, place, and time. He appears well-developed and well-nourished.   HENT:   Head: Normocephalic and atraumatic.   Right Ear: External ear and ear canal normal. Tympanic membrane is not erythematous.   Left Ear: External ear and ear canal normal. Tympanic membrane is not erythematous.   Nose: Mucosal edema and rhinorrhea present.   Mouth/Throat: No oropharyngeal exudate or posterior oropharyngeal erythema.   Eyes: Pupils are equal, round, and reactive to light. Conjunctivae and EOM are normal. No scleral icterus.   Neck: Normal range of motion. Neck supple. No JVD present. No thyromegaly present.   Cardiovascular: Normal rate, regular rhythm and normal heart sounds.    No murmur heard.  Pulmonary/Chest: Effort normal. No respiratory distress. He has no decreased breath sounds. He has no wheezes. He exhibits no tenderness.   Abdominal: Soft. Bowel sounds are normal. He exhibits no mass. There is no tenderness.   Musculoskeletal: He exhibits tenderness. He exhibits no edema.        Right elbow: Tenderness found. Medial epicondyle and olecranon process tenderness noted.        Left knee: He exhibits decreased range of motion and swelling (mild). He exhibits no ecchymosis. Tenderness found. Medial joint line and lateral joint " line tenderness noted.        Lumbar back: He exhibits decreased range of motion, tenderness, pain and spasm. He exhibits no swelling.        Right hand: He exhibits tenderness (3rd digit). He exhibits normal capillary refill.     Skin Integrity  -  His right foot skin is intact.His left foot skin is intact..  Lymphadenopathy:        Head (right side): No submandibular adenopathy present.        Head (left side): No submandibular adenopathy present.     He has no cervical adenopathy.   Neurological: He is alert and oriented to person, place, and time. He has normal reflexes. No cranial nerve deficit. He exhibits normal muscle tone. Coordination normal.   Skin: Skin is warm and dry. Capillary refill takes less than 2 seconds. Ecchymosis (bilateral Tib/Fib area) noted.   Psychiatric: His speech is normal. Judgment and thought content normal. His mood appears anxious. He is slowed. He is not actively hallucinating. Cognition and memory are normal. He exhibits a depressed mood. He expresses no homicidal and no suicidal ideation. He exhibits normal recent memory and normal remote memory.   Crying and tearful during exam.  Flat affect He is attentive.   Vitals reviewed.      Assessment/Plan     Problem List Items Addressed This Visit        Nervous and Auditory    Chronic bilateral low back pain with left-sided sciatica    Relevant Medications    HYDROcodone-acetaminophen (NORCO)  MG per tablet    Chronic elbow pain, right - Primary    Current Assessment & Plan     Will request xray from [facility] from a recent visit.            Musculoskeletal and Integument    Chondromalacia, knee    Relevant Medications    HYDROcodone-acetaminophen (NORCO)  MG per tablet    Achilles tendinitis of both lower extremities    Relevant Medications    HYDROcodone-acetaminophen (NORCO)  MG per tablet    Muscle spasm of both lower legs    Relevant Medications    cyclobenzaprine (FLEXERIL) 10 MG tablet       Other    Generalized  anxiety disorder    Relevant Medications    clonazePAM (KlonoPIN) 0.5 MG tablet          Patient's Body mass index is 31.64 kg/m². BMI is above normal parameters. Recommendations include: pharmacological intervention.   (Normal BMI:  18.5-24.9, Overweight 25-29.9, Obesity 30 or greater)  I advised Jaquan of the risks of continuing to use tobacco, and I provided him with tobacco cessation educational materials in the After Visit Summary.   During this visit, I spent less than minutes counseling the patient regarding tobacco cessation.     Understands disease processes and need for medications.  Understands reasons for urgent and emergent care.  Patient (& family) verbalized agreement for treatment plan.   Emotional support and active listening provided.  Patient provided time to verbalize feelings.  Current outpatient and discharge medications have been reconciled for the patient.  Reviewed by: BALDOMERO Thao  Hospital records reviewed.  Discussed any specific concerns patient had regarding testing, labs, Etc.   Patient to bring his physical form from [facility] to be completed.  RTC 1 month, sooner if needed.         This document has been electronically signed by:  BALDOMERO Thao, NESHAP-C

## 2018-08-31 ENCOUNTER — OFFICE VISIT (OUTPATIENT)
Dept: FAMILY MEDICINE CLINIC | Facility: CLINIC | Age: 48
End: 2018-08-31

## 2018-08-31 VITALS
DIASTOLIC BLOOD PRESSURE: 82 MMHG | BODY MASS INDEX: 32.62 KG/M2 | SYSTOLIC BLOOD PRESSURE: 130 MMHG | OXYGEN SATURATION: 98 % | HEART RATE: 74 BPM | HEIGHT: 66 IN | WEIGHT: 203 LBS | TEMPERATURE: 98.1 F

## 2018-08-31 DIAGNOSIS — R06.02 SOB (SHORTNESS OF BREATH): ICD-10-CM

## 2018-08-31 DIAGNOSIS — F41.1 GENERALIZED ANXIETY DISORDER: ICD-10-CM

## 2018-08-31 DIAGNOSIS — G89.29 CHRONIC BILATERAL LOW BACK PAIN WITH LEFT-SIDED SCIATICA: ICD-10-CM

## 2018-08-31 DIAGNOSIS — F41.1 GENERALIZED ANXIETY DISORDER: Primary | ICD-10-CM

## 2018-08-31 DIAGNOSIS — J06.9 ACUTE URI: ICD-10-CM

## 2018-08-31 DIAGNOSIS — M54.42 CHRONIC BILATERAL LOW BACK PAIN WITH LEFT-SIDED SCIATICA: ICD-10-CM

## 2018-08-31 DIAGNOSIS — E78.2 MIXED HYPERLIPIDEMIA: ICD-10-CM

## 2018-08-31 DIAGNOSIS — Z00.00 VISIT FOR ANNUAL HEALTH EXAMINATION: ICD-10-CM

## 2018-08-31 DIAGNOSIS — E66.9 OBESITY (BMI 30.0-34.9): ICD-10-CM

## 2018-08-31 DIAGNOSIS — I10 ESSENTIAL HYPERTENSION: ICD-10-CM

## 2018-08-31 DIAGNOSIS — G62.9 NEUROPATHY: ICD-10-CM

## 2018-08-31 DIAGNOSIS — M94.262 CHONDROMALACIA OF LEFT KNEE: ICD-10-CM

## 2018-08-31 LAB
BACTERIA UR QL AUTO: ABNORMAL /HPF
BILIRUB UR QL STRIP: NEGATIVE
CLARITY UR: CLEAR
COLOR UR: YELLOW
GLUCOSE UR STRIP-MCNC: NEGATIVE MG/DL
HGB UR QL STRIP.AUTO: NEGATIVE
HYALINE CASTS UR QL AUTO: ABNORMAL /LPF
KETONES UR QL STRIP: NEGATIVE
LEUKOCYTE ESTERASE UR QL STRIP.AUTO: NEGATIVE
NITRITE UR QL STRIP: NEGATIVE
PH UR STRIP.AUTO: 5.5 [PH] (ref 5–8)
PROT UR QL STRIP: NEGATIVE
RBC # UR: ABNORMAL /HPF
REF LAB TEST METHOD: ABNORMAL
SP GR UR STRIP: 1.01 (ref 1–1.03)
SQUAMOUS #/AREA URNS HPF: ABNORMAL /HPF
UROBILINOGEN UR QL STRIP: NORMAL
WBC UR QL AUTO: ABNORMAL /HPF

## 2018-08-31 PROCEDURE — 99396 PREV VISIT EST AGE 40-64: CPT | Performed by: NURSE PRACTITIONER

## 2018-08-31 PROCEDURE — 81001 URINALYSIS AUTO W/SCOPE: CPT | Performed by: NURSE PRACTITIONER

## 2018-08-31 PROCEDURE — 99213 OFFICE O/P EST LOW 20 MIN: CPT | Performed by: NURSE PRACTITIONER

## 2018-08-31 RX ORDER — AZITHROMYCIN 250 MG/1
TABLET, FILM COATED ORAL
Qty: 6 TABLET | Refills: 0 | Status: SHIPPED | OUTPATIENT
Start: 2018-08-31 | End: 2018-09-25

## 2018-08-31 RX ORDER — HYDROCODONE BITARTRATE AND ACETAMINOPHEN 10; 325 MG/1; MG/1
1 TABLET ORAL 2 TIMES DAILY PRN
Qty: 60 TABLET | Refills: 0 | Status: SHIPPED | OUTPATIENT
Start: 2018-08-31 | End: 2018-10-02 | Stop reason: SDUPTHER

## 2018-08-31 RX ORDER — CLONAZEPAM 0.5 MG/1
0.5 TABLET ORAL DAILY PRN
Qty: 10 TABLET | Refills: 0 | Status: SHIPPED | OUTPATIENT
Start: 2018-08-31 | End: 2018-10-02 | Stop reason: SDUPTHER

## 2018-09-05 DIAGNOSIS — G40.802 OTHER EPILEPSY WITHOUT STATUS EPILEPTICUS, NOT INTRACTABLE (HCC): ICD-10-CM

## 2018-09-05 RX ORDER — PHENYTOIN SODIUM 100 MG/1
CAPSULE, EXTENDED RELEASE ORAL
Qty: 150 CAPSULE | Refills: 5 | Status: ON HOLD | OUTPATIENT
Start: 2018-09-05 | End: 2018-09-25

## 2018-09-05 RX ORDER — PHENYTOIN SODIUM 100 MG/1
CAPSULE, EXTENDED RELEASE ORAL
Qty: 150 CAPSULE | Refills: 5 | Status: SHIPPED | OUTPATIENT
Start: 2018-09-05 | End: 2018-09-05 | Stop reason: SDUPTHER

## 2018-09-06 ENCOUNTER — HOSPITAL ENCOUNTER (OUTPATIENT)
Dept: GENERAL RADIOLOGY | Facility: HOSPITAL | Age: 48
Discharge: HOME | End: 2018-09-06
Admitting: NURSE PRACTITIONER

## 2018-09-06 ENCOUNTER — LAB (OUTPATIENT)
Dept: LAB | Facility: HOSPITAL | Age: 48
End: 2018-09-06

## 2018-09-06 DIAGNOSIS — E66.9 OBESITY (BMI 30.0-34.9): ICD-10-CM
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
## 2018-09-06 DIAGNOSIS — E78.2 MIXED HYPERLIPIDEMIA: ICD-10-CM

## 2018-09-06 DIAGNOSIS — F41.1 GENERALIZED ANXIETY DISORDER: ICD-10-CM

## 2018-09-06 DIAGNOSIS — R06.02 SOB (SHORTNESS OF BREATH): ICD-10-CM

## 2018-09-06 DIAGNOSIS — I10 ESSENTIAL HYPERTENSION: ICD-10-CM

## 2018-09-06 LAB
6-ACETYL MORPHINE: NEGATIVE
ALBUMIN SERPL-MCNC: 4.3 G/DL (ref 3.5–5)
ALBUMIN/GLOB SERPL: 1.3 G/DL (ref 1.5–2.5)
ALP SERPL-CCNC: 84 U/L (ref 40–129)
ALT SERPL W P-5'-P-CCNC: 18 U/L (ref 10–44)
AMPHET+METHAMPHET UR QL: NEGATIVE
ANION GAP SERPL CALCULATED.3IONS-SCNC: 5.1 MMOL/L (ref 3.6–11.2)
AST SERPL-CCNC: 24 U/L (ref 10–34)
BARBITURATES UR QL SCN: NEGATIVE
BASOPHILS # BLD AUTO: 0.01 10*3/MM3 (ref 0–0.3)
BASOPHILS NFR BLD AUTO: 0.2 % (ref 0–2)
BENZODIAZ UR QL SCN: NEGATIVE
BILIRUB SERPL-MCNC: 0.4 MG/DL (ref 0.2–1.8)
BUN BLD-MCNC: 15 MG/DL (ref 7–21)
BUN/CREAT SERPL: 15.6 (ref 7–25)
BUPRENORPHINE SERPL-MCNC: NEGATIVE NG/ML
CALCIUM SPEC-SCNC: 8.9 MG/DL (ref 7.7–10)
CANNABINOIDS SERPL QL: NEGATIVE
CHLORIDE SERPL-SCNC: 104 MMOL/L (ref 99–112)
CO2 SERPL-SCNC: 29.9 MMOL/L (ref 24.3–31.9)
COCAINE UR QL: NEGATIVE
CORTIS SERPL-MCNC: 6.9 MCG/DL
CREAT BLD-MCNC: 0.96 MG/DL (ref 0.43–1.29)
DEPRECATED RDW RBC AUTO: 44.8 FL (ref 37–54)
EOSINOPHIL # BLD AUTO: 0.09 10*3/MM3 (ref 0–0.7)
EOSINOPHIL NFR BLD AUTO: 1.4 % (ref 0–5)
ERYTHROCYTE [DISTWIDTH] IN BLOOD BY AUTOMATED COUNT: 13 % (ref 11.5–14.5)
GFR SERPL CREATININE-BSD FRML MDRD: 85 ML/MIN/1.73
GLOBULIN UR ELPH-MCNC: 3.3 GM/DL
GLUCOSE BLD-MCNC: 94 MG/DL (ref 70–110)
HCT VFR BLD AUTO: 44.1 % (ref 42–52)
HGB BLD-MCNC: 15.1 G/DL (ref 14–18)
IMM GRANULOCYTES # BLD: 0.01 10*3/MM3 (ref 0–0.03)
IMM GRANULOCYTES NFR BLD: 0.2 % (ref 0–0.5)
LYMPHOCYTES # BLD AUTO: 1.27 10*3/MM3 (ref 1–3)
LYMPHOCYTES NFR BLD AUTO: 20 % (ref 21–51)
MCH RBC QN AUTO: 33.3 PG (ref 27–33)
MCHC RBC AUTO-ENTMCNC: 34.2 G/DL (ref 33–37)
MCV RBC AUTO: 97.1 FL (ref 80–94)
METHADONE UR QL SCN: NEGATIVE
MONOCYTES # BLD AUTO: 0.68 10*3/MM3 (ref 0.1–0.9)
MONOCYTES NFR BLD AUTO: 10.7 % (ref 0–10)
NEUTROPHILS # BLD AUTO: 4.3 10*3/MM3 (ref 1.4–6.5)
NEUTROPHILS NFR BLD AUTO: 67.5 % (ref 30–70)
OPIATES UR QL: POSITIVE
OSMOLALITY SERPL CALC.SUM OF ELEC: 278.1 MOSM/KG (ref 273–305)
OXYCODONE UR QL SCN: NEGATIVE
PCP UR QL SCN: NEGATIVE
PLATELET # BLD AUTO: 170 10*3/MM3 (ref 130–400)
PMV BLD AUTO: 10.1 FL (ref 6–10)
POTASSIUM BLD-SCNC: 3.6 MMOL/L (ref 3.5–5.3)
PROT SERPL-MCNC: 7.6 G/DL (ref 6–8)
PTH-INTACT SERPL-MCNC: 69.9 PG/ML (ref 14–72)
RBC # BLD AUTO: 4.54 10*6/MM3 (ref 4.7–6.1)
SODIUM BLD-SCNC: 139 MMOL/L (ref 135–153)
T3FREE SERPL-MCNC: 2.9 PG/ML (ref 2.3–4.2)
T4 FREE SERPL-MCNC: 0.94 NG/DL (ref 0.89–1.76)
TSH SERPL DL<=0.05 MIU/L-ACNC: 2.42 MIU/ML (ref 0.55–4.78)
WBC NRBC COR # BLD: 6.36 10*3/MM3 (ref 4.5–12.5)

## 2018-09-06 PROCEDURE — 80307 DRUG TEST PRSMV CHEM ANLYZR: CPT

## 2018-09-06 PROCEDURE — 84402 ASSAY OF FREE TESTOSTERONE: CPT

## 2018-09-06 PROCEDURE — 36415 COLL VENOUS BLD VENIPUNCTURE: CPT

## 2018-09-06 PROCEDURE — 84481 FREE ASSAY (FT-3): CPT

## 2018-09-06 PROCEDURE — 84146 ASSAY OF PROLACTIN: CPT

## 2018-09-06 PROCEDURE — 82533 TOTAL CORTISOL: CPT

## 2018-09-06 PROCEDURE — 83970 ASSAY OF PARATHORMONE: CPT

## 2018-09-06 PROCEDURE — 84439 ASSAY OF FREE THYROXINE: CPT

## 2018-09-06 PROCEDURE — 73590 X-RAY EXAM OF LOWER LEG: CPT

## 2018-09-06 PROCEDURE — 80050 GENERAL HEALTH PANEL: CPT

## 2018-09-06 PROCEDURE — 84403 ASSAY OF TOTAL TESTOSTERONE: CPT

## 2018-09-06 PROCEDURE — 73590 X-RAY EXAM OF LOWER LEG: CPT | Performed by: RADIOLOGY

## 2018-09-07 LAB — PROLACTIN SERPL-MCNC: 21.2 NG/ML (ref 4–15.2)

## 2018-09-08 LAB
TESTOST FREE SERPL-MCNC: 6.7 PG/ML (ref 6.8–21.5)
TESTOST SERPL-MCNC: 691 NG/DL (ref 264–916)

## 2018-09-11 ENCOUNTER — TELEPHONE (OUTPATIENT)
Dept: FAMILY MEDICINE CLINIC | Facility: CLINIC | Age: 48
End: 2018-09-11

## 2018-09-21 DIAGNOSIS — Z51.81 ENCOUNTER FOR THERAPEUTIC DRUG MONITORING: Primary | ICD-10-CM

## 2018-09-25 ENCOUNTER — OFFICE VISIT (OUTPATIENT)
Dept: CARDIOLOGY | Facility: CLINIC | Age: 48
End: 2018-09-25

## 2018-09-25 ENCOUNTER — HOSPITAL ENCOUNTER (INPATIENT)
Facility: HOSPITAL | Age: 48
LOS: 3 days | Discharge: HOME | End: 2018-09-28
Attending: EMERGENCY MEDICINE | Admitting: INTERNAL MEDICINE

## 2018-09-25 ENCOUNTER — APPOINTMENT (OUTPATIENT)
Dept: GENERAL RADIOLOGY | Facility: HOSPITAL | Age: 48
End: 2018-09-25

## 2018-09-25 VITALS
HEART RATE: 75 BPM | WEIGHT: 204 LBS | SYSTOLIC BLOOD PRESSURE: 130 MMHG | HEIGHT: 67 IN | DIASTOLIC BLOOD PRESSURE: 80 MMHG | BODY MASS INDEX: 32.02 KG/M2 | OXYGEN SATURATION: 99 % | RESPIRATION RATE: 16 BRPM

## 2018-09-25 DIAGNOSIS — I20.0 UNSTABLE ANGINA (HCC): ICD-10-CM

## 2018-09-25 DIAGNOSIS — R07.9 CHEST PAIN, UNSPECIFIED TYPE: Primary | ICD-10-CM

## 2018-09-25 DIAGNOSIS — I10 ESSENTIAL HYPERTENSION: Primary | ICD-10-CM

## 2018-09-25 DIAGNOSIS — E78.2 MIXED HYPERLIPIDEMIA: ICD-10-CM

## 2018-09-25 DIAGNOSIS — R07.2 PRECORDIAL PAIN: ICD-10-CM

## 2018-09-25 LAB
ALBUMIN SERPL-MCNC: 4.1 G/DL (ref 3.5–5)
ALBUMIN/GLOB SERPL: 1.2 G/DL (ref 1.5–2.5)
ALP SERPL-CCNC: 79 U/L (ref 40–129)
ALT SERPL W P-5'-P-CCNC: 19 U/L (ref 10–44)
ANION GAP SERPL CALCULATED.3IONS-SCNC: 4.5 MMOL/L (ref 3.6–11.2)
AST SERPL-CCNC: 22 U/L (ref 10–34)
BASOPHILS # BLD AUTO: 0.02 10*3/MM3 (ref 0–0.3)
BASOPHILS NFR BLD AUTO: 0.4 % (ref 0–2)
BILIRUB SERPL-MCNC: 0.2 MG/DL (ref 0.2–1.8)
BUN BLD-MCNC: 17 MG/DL (ref 7–21)
BUN/CREAT SERPL: 18.3 (ref 7–25)
CALCIUM SPEC-SCNC: 9 MG/DL (ref 7.7–10)
CHLORIDE SERPL-SCNC: 106 MMOL/L (ref 99–112)
CO2 SERPL-SCNC: 27.5 MMOL/L (ref 24.3–31.9)
CREAT BLD-MCNC: 0.93 MG/DL (ref 0.43–1.29)
D DIMER PPP FEU-MCNC: <0.27 MCGFEU/ML (ref 0–0.5)
DEPRECATED RDW RBC AUTO: 45.1 FL (ref 37–54)
EOSINOPHIL # BLD AUTO: 0.05 10*3/MM3 (ref 0–0.7)
EOSINOPHIL NFR BLD AUTO: 0.9 % (ref 0–5)
ERYTHROCYTE [DISTWIDTH] IN BLOOD BY AUTOMATED COUNT: 13 % (ref 11.5–14.5)
GFR SERPL CREATININE-BSD FRML MDRD: 88 ML/MIN/1.73
GLOBULIN UR ELPH-MCNC: 3.5 GM/DL
GLUCOSE BLD-MCNC: 87 MG/DL (ref 70–110)
HCT VFR BLD AUTO: 44.6 % (ref 42–52)
HGB BLD-MCNC: 15.7 G/DL (ref 14–18)
HOLD SPECIMEN: NORMAL
HOLD SPECIMEN: NORMAL
IMM GRANULOCYTES # BLD: 0.01 10*3/MM3 (ref 0–0.03)
IMM GRANULOCYTES NFR BLD: 0.2 % (ref 0–0.5)
LYMPHOCYTES # BLD AUTO: 1.22 10*3/MM3 (ref 1–3)
LYMPHOCYTES NFR BLD AUTO: 21.8 % (ref 21–51)
MCH RBC QN AUTO: 33.7 PG (ref 27–33)
MCHC RBC AUTO-ENTMCNC: 35.2 G/DL (ref 33–37)
MCV RBC AUTO: 95.7 FL (ref 80–94)
MONOCYTES # BLD AUTO: 0.9 10*3/MM3 (ref 0.1–0.9)
MONOCYTES NFR BLD AUTO: 16.1 % (ref 0–10)
NEUTROPHILS # BLD AUTO: 3.39 10*3/MM3 (ref 1.4–6.5)
NEUTROPHILS NFR BLD AUTO: 60.6 % (ref 30–70)
OSMOLALITY SERPL CALC.SUM OF ELEC: 276.6 MOSM/KG (ref 273–305)
PLATELET # BLD AUTO: 162 10*3/MM3 (ref 130–400)
PMV BLD AUTO: 10.4 FL (ref 6–10)
POTASSIUM BLD-SCNC: 4.5 MMOL/L (ref 3.5–5.3)
PROT SERPL-MCNC: 7.6 G/DL (ref 6–8)
RBC # BLD AUTO: 4.66 10*6/MM3 (ref 4.7–6.1)
SODIUM BLD-SCNC: 138 MMOL/L (ref 135–153)
TROPONIN I SERPL-MCNC: <0.006 NG/ML
WBC NRBC COR # BLD: 5.59 10*3/MM3 (ref 4.5–12.5)
WHOLE BLOOD HOLD SPECIMEN: NORMAL
WHOLE BLOOD HOLD SPECIMEN: NORMAL

## 2018-09-25 PROCEDURE — 25010000002 MORPHINE PER 10 MG: Performed by: INTERNAL MEDICINE

## 2018-09-25 PROCEDURE — 93005 ELECTROCARDIOGRAM TRACING: CPT | Performed by: EMERGENCY MEDICINE

## 2018-09-25 PROCEDURE — 85379 FIBRIN DEGRADATION QUANT: CPT | Performed by: PHYSICIAN ASSISTANT

## 2018-09-25 PROCEDURE — 93000 ELECTROCARDIOGRAM COMPLETE: CPT | Performed by: NURSE PRACTITIONER

## 2018-09-25 PROCEDURE — G0378 HOSPITAL OBSERVATION PER HR: HCPCS

## 2018-09-25 PROCEDURE — 25010000002 ONDANSETRON PER 1 MG: Performed by: EMERGENCY MEDICINE

## 2018-09-25 PROCEDURE — 85025 COMPLETE CBC W/AUTO DIFF WBC: CPT | Performed by: EMERGENCY MEDICINE

## 2018-09-25 PROCEDURE — 93010 ELECTROCARDIOGRAM REPORT: CPT | Performed by: INTERNAL MEDICINE

## 2018-09-25 PROCEDURE — 71046 X-RAY EXAM CHEST 2 VIEWS: CPT

## 2018-09-25 PROCEDURE — 84484 ASSAY OF TROPONIN QUANT: CPT | Performed by: EMERGENCY MEDICINE

## 2018-09-25 PROCEDURE — 84484 ASSAY OF TROPONIN QUANT: CPT | Performed by: INTERNAL MEDICINE

## 2018-09-25 PROCEDURE — 25010000002 ENOXAPARIN PER 10 MG: Performed by: INTERNAL MEDICINE

## 2018-09-25 PROCEDURE — 99285 EMERGENCY DEPT VISIT HI MDM: CPT

## 2018-09-25 PROCEDURE — 71046 X-RAY EXAM CHEST 2 VIEWS: CPT | Performed by: RADIOLOGY

## 2018-09-25 PROCEDURE — 80053 COMPREHEN METABOLIC PANEL: CPT | Performed by: EMERGENCY MEDICINE

## 2018-09-25 PROCEDURE — 99214 OFFICE O/P EST MOD 30 MIN: CPT | Performed by: NURSE PRACTITIONER

## 2018-09-25 PROCEDURE — 25010000002 MORPHINE SULFATE (PF) 2 MG/ML SOLUTION: Performed by: EMERGENCY MEDICINE

## 2018-09-25 RX ORDER — CETIRIZINE HYDROCHLORIDE 10 MG/1
10 TABLET ORAL DAILY
Status: CANCELLED | OUTPATIENT
Start: 2018-09-26

## 2018-09-25 RX ORDER — NITROGLYCERIN 0.4 MG/1
0.4 TABLET SUBLINGUAL
Status: DISCONTINUED | OUTPATIENT
Start: 2018-09-25 | End: 2018-09-28 | Stop reason: HOSPADM

## 2018-09-25 RX ORDER — NALOXONE HCL 0.4 MG/ML
0.4 VIAL (ML) INJECTION
Status: DISCONTINUED | OUTPATIENT
Start: 2018-09-25 | End: 2018-09-28 | Stop reason: HOSPADM

## 2018-09-25 RX ORDER — PHENYTOIN SODIUM 100 MG/1
200 CAPSULE, EXTENDED RELEASE ORAL EVERY MORNING
Status: DISCONTINUED | OUTPATIENT
Start: 2018-09-26 | End: 2018-09-28 | Stop reason: HOSPADM

## 2018-09-25 RX ORDER — PANTOPRAZOLE SODIUM 40 MG/1
40 TABLET, DELAYED RELEASE ORAL DAILY
Status: DISCONTINUED | OUTPATIENT
Start: 2018-09-26 | End: 2018-09-28 | Stop reason: HOSPADM

## 2018-09-25 RX ORDER — PHENYTOIN SODIUM 100 MG/1
200 CAPSULE, EXTENDED RELEASE ORAL EVERY MORNING
COMMUNITY
End: 2018-12-07 | Stop reason: SDUPTHER

## 2018-09-25 RX ORDER — CYCLOBENZAPRINE HCL 10 MG
10 TABLET ORAL 2 TIMES DAILY PRN
Status: CANCELLED | OUTPATIENT
Start: 2018-09-25

## 2018-09-25 RX ORDER — MORPHINE SULFATE 2 MG/ML
4 INJECTION, SOLUTION INTRAMUSCULAR; INTRAVENOUS ONCE
Status: COMPLETED | OUTPATIENT
Start: 2018-09-25 | End: 2018-09-25

## 2018-09-25 RX ORDER — CETIRIZINE HYDROCHLORIDE 10 MG/1
10 TABLET ORAL DAILY
Status: DISCONTINUED | OUTPATIENT
Start: 2018-09-26 | End: 2018-09-28 | Stop reason: HOSPADM

## 2018-09-25 RX ORDER — LISINOPRIL 10 MG/1
20 TABLET ORAL DAILY
Status: DISCONTINUED | OUTPATIENT
Start: 2018-09-26 | End: 2018-09-28 | Stop reason: HOSPADM

## 2018-09-25 RX ORDER — PANTOPRAZOLE SODIUM 40 MG/1
40 TABLET, DELAYED RELEASE ORAL DAILY
Status: CANCELLED | OUTPATIENT
Start: 2018-09-26

## 2018-09-25 RX ORDER — CYCLOBENZAPRINE HCL 10 MG
10 TABLET ORAL 2 TIMES DAILY PRN
Status: DISCONTINUED | OUTPATIENT
Start: 2018-09-25 | End: 2018-09-28 | Stop reason: HOSPADM

## 2018-09-25 RX ORDER — DEXTROSE AND SODIUM CHLORIDE 5; .45 G/100ML; G/100ML
100 INJECTION, SOLUTION INTRAVENOUS CONTINUOUS
Status: DISCONTINUED | OUTPATIENT
Start: 2018-09-25 | End: 2018-09-26

## 2018-09-25 RX ORDER — CLONAZEPAM 0.5 MG/1
0.5 TABLET ORAL DAILY PRN
Status: DISCONTINUED | OUTPATIENT
Start: 2018-09-25 | End: 2018-09-28 | Stop reason: HOSPADM

## 2018-09-25 RX ORDER — PHENYTOIN SODIUM 100 MG/1
300 CAPSULE, EXTENDED RELEASE ORAL EVERY EVENING
Status: DISCONTINUED | OUTPATIENT
Start: 2018-09-25 | End: 2018-09-28 | Stop reason: HOSPADM

## 2018-09-25 RX ORDER — PHENYTOIN SODIUM 100 MG/1
300 CAPSULE, EXTENDED RELEASE ORAL EVERY EVENING
Status: CANCELLED | OUTPATIENT
Start: 2018-09-25

## 2018-09-25 RX ORDER — ASPIRIN 81 MG/1
324 TABLET, CHEWABLE ORAL ONCE
Status: COMPLETED | OUTPATIENT
Start: 2018-09-25 | End: 2018-09-25

## 2018-09-25 RX ORDER — ATORVASTATIN CALCIUM 20 MG/1
20 TABLET, FILM COATED ORAL DAILY
Status: CANCELLED | OUTPATIENT
Start: 2018-09-25

## 2018-09-25 RX ORDER — PHENYTOIN SODIUM 100 MG/1
200 CAPSULE, EXTENDED RELEASE ORAL EVERY MORNING
Status: CANCELLED | OUTPATIENT
Start: 2018-09-26

## 2018-09-25 RX ORDER — ONDANSETRON 2 MG/ML
4 INJECTION INTRAMUSCULAR; INTRAVENOUS ONCE
Status: COMPLETED | OUTPATIENT
Start: 2018-09-25 | End: 2018-09-25

## 2018-09-25 RX ORDER — ONDANSETRON 2 MG/ML
4 INJECTION INTRAMUSCULAR; INTRAVENOUS EVERY 6 HOURS PRN
Status: DISCONTINUED | OUTPATIENT
Start: 2018-09-25 | End: 2018-09-28 | Stop reason: HOSPADM

## 2018-09-25 RX ORDER — PHENYTOIN SODIUM 100 MG/1
300 CAPSULE, EXTENDED RELEASE ORAL EVERY EVENING
COMMUNITY
End: 2019-06-03

## 2018-09-25 RX ORDER — ATORVASTATIN CALCIUM 20 MG/1
20 TABLET, FILM COATED ORAL NIGHTLY
Status: DISCONTINUED | OUTPATIENT
Start: 2018-09-25 | End: 2018-09-28 | Stop reason: HOSPADM

## 2018-09-25 RX ORDER — HYDROCODONE BITARTRATE AND ACETAMINOPHEN 10; 325 MG/1; MG/1
1 TABLET ORAL 2 TIMES DAILY PRN
Status: CANCELLED | OUTPATIENT
Start: 2018-09-25

## 2018-09-25 RX ORDER — LISINOPRIL 10 MG/1
20 TABLET ORAL DAILY
Status: CANCELLED | OUTPATIENT
Start: 2018-09-26

## 2018-09-25 RX ORDER — ACETAMINOPHEN 325 MG/1
650 TABLET ORAL EVERY 6 HOURS PRN
Status: DISCONTINUED | OUTPATIENT
Start: 2018-09-25 | End: 2018-09-28 | Stop reason: HOSPADM

## 2018-09-25 RX ORDER — MONTELUKAST SODIUM 10 MG/1
10 TABLET ORAL NIGHTLY
Status: DISCONTINUED | OUTPATIENT
Start: 2018-09-25 | End: 2018-09-28 | Stop reason: HOSPADM

## 2018-09-25 RX ORDER — CLONAZEPAM 0.5 MG/1
0.5 TABLET ORAL DAILY PRN
Status: CANCELLED | OUTPATIENT
Start: 2018-09-25

## 2018-09-25 RX ORDER — SODIUM CHLORIDE 0.9 % (FLUSH) 0.9 %
1-10 SYRINGE (ML) INJECTION AS NEEDED
Status: DISCONTINUED | OUTPATIENT
Start: 2018-09-25 | End: 2018-09-28 | Stop reason: HOSPADM

## 2018-09-25 RX ORDER — HYDROCODONE BITARTRATE AND ACETAMINOPHEN 10; 325 MG/1; MG/1
1 TABLET ORAL 2 TIMES DAILY PRN
Status: DISCONTINUED | OUTPATIENT
Start: 2018-09-25 | End: 2018-09-28 | Stop reason: HOSPADM

## 2018-09-25 RX ORDER — SODIUM CHLORIDE 0.9 % (FLUSH) 0.9 %
10 SYRINGE (ML) INJECTION AS NEEDED
Status: DISCONTINUED | OUTPATIENT
Start: 2018-09-25 | End: 2018-09-28 | Stop reason: HOSPADM

## 2018-09-25 RX ORDER — MONTELUKAST SODIUM 10 MG/1
10 TABLET ORAL NIGHTLY
Status: CANCELLED | OUTPATIENT
Start: 2018-09-25

## 2018-09-25 RX ORDER — MORPHINE SULFATE 2 MG/ML
1 INJECTION, SOLUTION INTRAMUSCULAR; INTRAVENOUS 4 TIMES DAILY PRN
Status: DISCONTINUED | OUTPATIENT
Start: 2018-09-25 | End: 2018-09-28 | Stop reason: HOSPADM

## 2018-09-25 RX ADMIN — ONDANSETRON HYDROCHLORIDE 4 MG: 2 INJECTION, SOLUTION INTRAMUSCULAR; INTRAVENOUS at 12:54

## 2018-09-25 RX ADMIN — ENOXAPARIN SODIUM 40 MG: 100 INJECTION SUBCUTANEOUS at 17:19

## 2018-09-25 RX ADMIN — ASPIRIN 324 MG: 81 TABLET, CHEWABLE ORAL at 12:25

## 2018-09-25 RX ADMIN — ATORVASTATIN CALCIUM 20 MG: 20 TABLET, FILM COATED ORAL at 21:17

## 2018-09-25 RX ADMIN — MONTELUKAST SODIUM 10 MG: 10 TABLET, FILM COATED ORAL at 21:17

## 2018-09-25 RX ADMIN — NITROGLYCERIN 0.4 MG: 0.4 TABLET, ORALLY DISINTEGRATING SUBLINGUAL at 12:30

## 2018-09-25 RX ADMIN — NITROGLYCERIN 1 INCH: 20 OINTMENT TOPICAL at 12:52

## 2018-09-25 RX ADMIN — MORPHINE SULFATE 1 MG: 2 INJECTION, SOLUTION INTRAMUSCULAR; INTRAVENOUS at 17:09

## 2018-09-25 RX ADMIN — MORPHINE SULFATE 4 MG: 2 INJECTION, SOLUTION INTRAMUSCULAR; INTRAVENOUS at 12:56

## 2018-09-25 RX ADMIN — DEXTROSE AND SODIUM CHLORIDE 100 ML/HR: 5; 450 INJECTION, SOLUTION INTRAVENOUS at 17:05

## 2018-09-25 RX ADMIN — NITROGLYCERIN 0.4 MG: 0.4 TABLET, ORALLY DISINTEGRATING SUBLINGUAL at 12:42

## 2018-09-25 RX ADMIN — NITROGLYCERIN 1 INCH: 20 OINTMENT TOPICAL at 17:13

## 2018-09-25 RX ADMIN — NITROGLYCERIN 0.4 MG: 0.4 TABLET, ORALLY DISINTEGRATING SUBLINGUAL at 12:35

## 2018-09-25 RX ADMIN — PHENYTOIN SODIUM 300 MG: 100 CAPSULE, EXTENDED RELEASE ORAL at 21:18

## 2018-09-25 NOTE — PROGRESS NOTES
Tiear Valenzuela APRN  Jaquan Mckay  1970 09/25/2018    Patient Active Problem List   Diagnosis   • GERD (gastroesophageal reflux disease)   • Arthritis   • Hypertension   • Seizure disorder (CMS/HCC)   • Hyperlipidemia   • Anxiety   • Varicocele   • Varicocele present on ultrasound of scrotum   • Obesity (BMI 30.0-34.9)   • Chronic bilateral low back pain with left-sided sciatica   • Seasonal allergic rhinitis due to pollen   • Mild intermittent asthma with acute exacerbation   • Precordial pain   • Dysuria   • Congenital coronary artery anomaly   • BMI 35.0-35.9,adult   • Chondromalacia, knee   • Achilles tendinitis of both lower extremities   • Muscle spasm of both lower legs   • Personal history of allergy to shellfish   • Sensation of cold in lower extremity   • Varicose vein of leg   • Heart palpitations   • SOB (shortness of breath)   • Generalized anxiety disorder   • Chronic elbow pain, right       Dear Tiera Valenzuela APRN:    Subjective     Chief Complaint   Patient presents with   • Follow-up     3 mos, PFT and lab findings   • Chest Pain     sharp, rated 6-9   • Palpitations     races,skips   • Shortness of Breath     routine activity   • Med Management     presented           History of Present Illness:    Jaquan Mckay is a 47 y.o. male with a history of hypertension, hyperlipidemia, and chronic chest pains.  He presents today for routine cardiology follow-up.  He was previously having palpitations and was evaluated with a 48 Holter monitor.  This was considered normal monitor study with no arrhythmias noted.  He was also having some shortness of breath.  PFTs were normal.  A stress echo revealed normal EF on the echo portion with trace aortic valve regurgitation and calcification of the aortic valve but no other valvular abnormality.  This also revealed left ventricular diastolic dysfunction, grade 1.  The stress portion of the exam revealed no evidence of myocardial ischemia.  Today he  reports he has been feeling badly lately.  Over the past couple weeks chest pains have increased in intensity and duration.  Last night was the worst chest pressure he had it was located in the midsternal area.  This chest pain radiates into the jaw and the shoulder.  He reports the chest pain never really went away he is still experiencing chest pain this morning.  He denies shortness of breath.  He has had some dizziness and lightheadedness.  He cannot identify any aggravating or exacerbating factors.  He does report a history of acid reflux as well as anxiety.          Allergies   Allergen Reactions   • Paxil [Paroxetine Hcl] Shortness Of Breath     Chest pain    • Isosorbide Nitrate Rash     Rash, hives, had to use inhaler.    • Ciprofloxacin    • Contrast Dye    • Fish-Derived Products Hives   • Mobic [Meloxicam]    • Penicillins    • Prednisone    • Robitussin Cough+ Chest Max St  [Dextromethorphan-Guaifenesin]    • Sulfa Antibiotics    • Zoloft [Sertraline Hcl] Hives and Itching   • Diltiazem Rash   • Gabapentin Rash   • Keflex [Cephalexin] Rash   • Metoprolol Rash   • Peanut-Containing Drug Products Rash   • Shrimp (Diagnostic) Rash   • Spironolactone Rash   :      Current Outpatient Prescriptions:   •  cetirizine (zyrTEC) 10 MG tablet, , Disp: , Rfl:   •  clonazePAM (KlonoPIN) 0.5 MG tablet, Take 1 tablet by mouth Daily As Needed for Anxiety., Disp: 10 tablet, Rfl: 0  •  cyclobenzaprine (FLEXERIL) 10 MG tablet, Take 1 tablet by mouth 2 (Two) Times a Day As Needed for Muscle Spasms., Disp: 60 tablet, Rfl: 5  •  HYDROcodone-acetaminophen (NORCO)  MG per tablet, Take 1 tablet by mouth 2 (Two) Times a Day As Needed for Moderate Pain ., Disp: 60 tablet, Rfl: 0  •  lisinopril (PRINIVIL,ZESTRIL) 20 MG tablet, Take 1 tablet by mouth Daily. for blood pressure., Disp: 30 tablet, Rfl: 5  •  montelukast (SINGULAIR) 10 MG tablet, Take 1 tablet by mouth Every Night., Disp: 30 tablet, Rfl: 5  •  pantoprazole  (PROTONIX) 40 MG EC tablet, Take 1 tablet by mouth Daily., Disp: 30 tablet, Rfl: 5  •  phenytoin (DILANTIN) 100 MG ER capsule, Take 2 capsules by mouth every morning and take 3 capsules every evening., Disp: 150 capsule, Rfl: 5  •  pravastatin (PRAVACHOL) 80 MG tablet, Take 1 tablet by mouth every night at bedtime., Disp: 30 tablet, Rfl: 6  •  albuterol (PROVENTIL HFA;VENTOLIN HFA) 108 (90 Base) MCG/ACT inhaler, Inhale 2 puffs Every 4 (Four) Hours As Needed for Wheezing or Shortness of Air., Disp: 18 g, Rfl: 5  •  budesonide-formoterol (SYMBICORT) 160-4.5 MCG/ACT inhaler, Inhale 2 puffs 2 (Two) Times a Day., Disp: 10.2 g, Rfl: 5  •  EPINEPHrine (EPIPEN IJ), Inject  as directed As Needed., Disp: , Rfl:   •  ipratropium-albuterol (COMBIVENT RESPIMAT)  MCG/ACT inhaler, Inhale 1 puff 4 (Four) Times a Day As Needed for Wheezing., Disp: 4 g, Rfl: 11  •  loratadine (CLARITIN) 10 MG tablet, TAKE 1 TABLET BY MOUTH EVERY DAY FOR ALLERGIES, Disp: 30 tablet, Rfl: 5  •  ondansetron ODT (ZOFRAN-ODT) 4 MG disintegrating tablet, , Disp: , Rfl:   •  raNITIdine (ZANTAC) 300 MG tablet, Take 1 tablet by mouth 2 (Two) Times a Day., Disp: 60 tablet, Rfl: 5      The following portions of the patient's history were reviewed and updated as appropriate: allergies, current medications, past family history, past medical history, past social history, past surgical history and problem list.    Social History   Substance Use Topics   • Smoking status: Current Every Day Smoker     Packs/day: 1.50     Years: 7.00     Types: Cigars, Cigarettes     Start date: 5/5/2010   • Smokeless tobacco: Never Used   • Alcohol use No       Review of Systems   Constitution: Negative for malaise/fatigue.   Cardiovascular: Positive for chest pain and palpitations. Negative for leg swelling.   Respiratory: Positive for shortness of breath. Negative for cough and wheezing.    Neurological: Negative for dizziness and light-headedness.   Psychiatric/Behavioral:  "The patient is nervous/anxious.        Objective   Vitals:    09/25/18 1004   BP: 130/80   Pulse: 75   Resp: 16   SpO2: 99%   Weight: 92.5 kg (204 lb)   Height: 170.2 cm (67\")     Body mass index is 31.95 kg/m².        Physical Exam   Constitutional: He is oriented to person, place, and time. He appears well-developed and well-nourished.   HENT:   Head: Normocephalic and atraumatic.   Cardiovascular: Normal rate, regular rhythm and normal heart sounds.  Exam reveals no S3 and no S4.    No murmur heard.  Pulmonary/Chest: Effort normal and breath sounds normal. He has no wheezes. He has no rales.   Abdominal: Soft. Bowel sounds are normal.   Musculoskeletal: He exhibits no edema.   Neurological: He is alert and oriented to person, place, and time.   Skin: Skin is warm and dry.   Psychiatric: He has a normal mood and affect. His behavior is normal.       Lab Results   Component Value Date     09/06/2018    K 3.6 09/06/2018     09/06/2018    CO2 29.9 09/06/2018    BUN 15 09/06/2018    CREATININE 0.96 09/06/2018    GLUCOSE 94 09/06/2018    CALCIUM 8.9 09/06/2018    AST 24 09/06/2018    ALT 18 09/06/2018    ALKPHOS 84 09/06/2018    LABIL2 1.1 (L) 05/29/2016     Lab Results   Component Value Date    CKTOTAL 171 07/25/2018     Lab Results   Component Value Date    WBC 6.36 09/06/2018    HGB 15.1 09/06/2018    HCT 44.1 09/06/2018     09/06/2018     Lab Results   Component Value Date    INR 1.03 02/06/2018    INR 0.97 09/26/2017    INR 0.99 12/20/2016     Lab Results   Component Value Date    MG 2.0 08/13/2015     Lab Results   Component Value Date    TSH 2.418 09/06/2018    PSA 0.59 12/09/2014    CHLPL 232 (H) 04/05/2016    TRIG 57 05/08/2018    HDL 67 05/08/2018     (H) 05/08/2018      Lab Results   Component Value Date    BNP 6.0 07/25/2018             ECG 12 Lead  Date/Time: 9/25/2018 10:07 AM  Performed by: KOURTNEY MOTTA  Authorized by: KOURTNEY MOTTA   Comparison: compared with previous " ECG   Rhythm: sinus rhythm  BPM: 77  Comments: Minimal ST elevation in the inferior leads, probably early repolarization.              Assessment/Plan    Diagnosis Plan   1. Essential hypertension     2. Mixed hyperlipidemia     3. Precordial pain                  Recommendations:    1. EKG today does reveal new findings of minimal ST elevation, probably early repolarization. Since he is having active chest pain in the office, will refer him to the ED for further evaluation and treatment. He has refused ambulance transport and will take private vehicle. Report called to Leonides in the ED.    2. Follow up pending ER evaluation.      Plan of care discussed with Dr. Daily.     No Follow-up on file.    As always, I appreciate very much the opportunity to participate in the cardiovascular care of your patients.      With Best Regards,    BALDOMERO Horowitz

## 2018-09-26 LAB
ANION GAP SERPL CALCULATED.3IONS-SCNC: 5 MMOL/L (ref 3.6–11.2)
BNP SERPL-MCNC: 3 PG/ML (ref 0–100)
BUN BLD-MCNC: 12 MG/DL (ref 7–21)
BUN/CREAT SERPL: 14 (ref 7–25)
CALCIUM SPEC-SCNC: 8.5 MG/DL (ref 7.7–10)
CHLORIDE SERPL-SCNC: 106 MMOL/L (ref 99–112)
CHOLEST SERPL-MCNC: 186 MG/DL (ref 0–200)
CO2 SERPL-SCNC: 25 MMOL/L (ref 24.3–31.9)
CREAT BLD-MCNC: 0.86 MG/DL (ref 0.43–1.29)
DEPRECATED RDW RBC AUTO: 42 FL (ref 37–54)
EOSINOPHIL # BLD MANUAL: 0.09 10*3/MM3 (ref 0–0.7)
EOSINOPHIL NFR BLD MANUAL: 1 % (ref 0–5)
ERYTHROCYTE [DISTWIDTH] IN BLOOD BY AUTOMATED COUNT: 12.4 % (ref 11.5–14.5)
GFR SERPL CREATININE-BSD FRML MDRD: 96 ML/MIN/1.73
GLUCOSE BLD-MCNC: 101 MG/DL (ref 70–110)
HCT VFR BLD AUTO: 41.3 % (ref 42–52)
HDLC SERPL-MCNC: 64 MG/DL (ref 60–100)
HGB BLD-MCNC: 14.4 G/DL (ref 14–18)
LDLC SERPL CALC-MCNC: 104 MG/DL (ref 0–100)
LDLC/HDLC SERPL: 1.62 {RATIO}
LYMPHOCYTES # BLD MANUAL: 1.98 10*3/MM3 (ref 1–3)
LYMPHOCYTES NFR BLD MANUAL: 22 % (ref 21–51)
LYMPHOCYTES NFR BLD MANUAL: 9 % (ref 0–10)
MCH RBC QN AUTO: 33.4 PG (ref 27–33)
MCHC RBC AUTO-ENTMCNC: 34.9 G/DL (ref 33–37)
MCV RBC AUTO: 95.8 FL (ref 80–94)
MONOCYTES # BLD AUTO: 0.81 10*3/MM3 (ref 0.1–0.9)
NEUTROPHILS # BLD AUTO: 6.13 10*3/MM3 (ref 1.4–6.5)
NEUTROPHILS NFR BLD MANUAL: 68 % (ref 30–70)
OSMOLALITY SERPL CALC.SUM OF ELEC: 271.9 MOSM/KG (ref 273–305)
PLAT MORPH BLD: NORMAL
PLATELET # BLD AUTO: 173 10*3/MM3 (ref 130–400)
PMV BLD AUTO: 10.5 FL (ref 6–10)
POTASSIUM BLD-SCNC: 3.9 MMOL/L (ref 3.5–5.3)
RBC # BLD AUTO: 4.31 10*6/MM3 (ref 4.7–6.1)
RBC MORPH BLD: NORMAL
SODIUM BLD-SCNC: 136 MMOL/L (ref 135–153)
TRIGL SERPL-MCNC: 91 MG/DL (ref 0–150)
TROPONIN I SERPL-MCNC: <0.006 NG/ML
VLDLC SERPL-MCNC: 18.2 MG/DL
WBC NRBC COR # BLD: 9.02 10*3/MM3 (ref 4.5–12.5)

## 2018-09-26 PROCEDURE — G0378 HOSPITAL OBSERVATION PER HR: HCPCS

## 2018-09-26 PROCEDURE — 93010 ELECTROCARDIOGRAM REPORT: CPT | Performed by: INTERNAL MEDICINE

## 2018-09-26 PROCEDURE — 80061 LIPID PANEL: CPT | Performed by: PHYSICIAN ASSISTANT

## 2018-09-26 PROCEDURE — 80048 BASIC METABOLIC PNL TOTAL CA: CPT | Performed by: INTERNAL MEDICINE

## 2018-09-26 PROCEDURE — 93005 ELECTROCARDIOGRAM TRACING: CPT | Performed by: PHYSICIAN ASSISTANT

## 2018-09-26 PROCEDURE — 85007 BL SMEAR W/DIFF WBC COUNT: CPT | Performed by: INTERNAL MEDICINE

## 2018-09-26 PROCEDURE — 25010000002 METHYLPREDNISOLONE PER 40 MG: Performed by: INTERNAL MEDICINE

## 2018-09-26 PROCEDURE — 94799 UNLISTED PULMONARY SVC/PX: CPT

## 2018-09-26 PROCEDURE — 80186 ASSAY OF PHENYTOIN FREE: CPT | Performed by: INTERNAL MEDICINE

## 2018-09-26 PROCEDURE — 25010000002 MORPHINE PER 10 MG: Performed by: INTERNAL MEDICINE

## 2018-09-26 PROCEDURE — 25010000002 ENOXAPARIN PER 10 MG: Performed by: INTERNAL MEDICINE

## 2018-09-26 PROCEDURE — 99232 SBSQ HOSP IP/OBS MODERATE 35: CPT | Performed by: NURSE PRACTITIONER

## 2018-09-26 PROCEDURE — 63710000001 DIPHENHYDRAMINE PER 50 MG: Performed by: INTERNAL MEDICINE

## 2018-09-26 PROCEDURE — 85027 COMPLETE CBC AUTOMATED: CPT | Performed by: INTERNAL MEDICINE

## 2018-09-26 PROCEDURE — 99222 1ST HOSP IP/OBS MODERATE 55: CPT | Performed by: INTERNAL MEDICINE

## 2018-09-26 PROCEDURE — 83880 ASSAY OF NATRIURETIC PEPTIDE: CPT | Performed by: INTERNAL MEDICINE

## 2018-09-26 PROCEDURE — 84484 ASSAY OF TROPONIN QUANT: CPT | Performed by: INTERNAL MEDICINE

## 2018-09-26 RX ORDER — DIPHENHYDRAMINE HCL 25 MG
25 CAPSULE ORAL ONCE
Status: COMPLETED | OUTPATIENT
Start: 2018-09-26 | End: 2018-09-26

## 2018-09-26 RX ORDER — METHYLPREDNISOLONE SODIUM SUCCINATE 40 MG/ML
40 INJECTION, POWDER, LYOPHILIZED, FOR SOLUTION INTRAMUSCULAR; INTRAVENOUS EVERY 8 HOURS
Status: COMPLETED | OUTPATIENT
Start: 2018-09-26 | End: 2018-09-27

## 2018-09-26 RX ORDER — CLOPIDOGREL BISULFATE 75 MG/1
75 TABLET ORAL DAILY
Status: DISCONTINUED | OUTPATIENT
Start: 2018-09-26 | End: 2018-09-28 | Stop reason: HOSPADM

## 2018-09-26 RX ORDER — DIPHENHYDRAMINE HCL 25 MG
25 CAPSULE ORAL ONCE
Status: COMPLETED | OUTPATIENT
Start: 2018-09-27 | End: 2018-09-27

## 2018-09-26 RX ORDER — POLYETHYLENE GLYCOL 3350 17 G/17G
17 POWDER, FOR SOLUTION ORAL DAILY
Status: DISCONTINUED | OUTPATIENT
Start: 2018-09-26 | End: 2018-09-28 | Stop reason: HOSPADM

## 2018-09-26 RX ADMIN — ENOXAPARIN SODIUM 40 MG: 100 INJECTION SUBCUTANEOUS at 15:53

## 2018-09-26 RX ADMIN — NITROGLYCERIN 1 INCH: 20 OINTMENT TOPICAL at 06:33

## 2018-09-26 RX ADMIN — DIPHENHYDRAMINE HYDROCHLORIDE 25 MG: 25 CAPSULE ORAL at 20:49

## 2018-09-26 RX ADMIN — PHENYTOIN SODIUM 300 MG: 100 CAPSULE, EXTENDED RELEASE ORAL at 15:54

## 2018-09-26 RX ADMIN — HYDROCODONE BITARTRATE AND ACETAMINOPHEN 1 TABLET: 10; 325 TABLET ORAL at 09:09

## 2018-09-26 RX ADMIN — DEXTROSE AND SODIUM CHLORIDE 100 ML/HR: 5; 450 INJECTION, SOLUTION INTRAVENOUS at 03:01

## 2018-09-26 RX ADMIN — MONTELUKAST SODIUM 10 MG: 10 TABLET, FILM COATED ORAL at 19:48

## 2018-09-26 RX ADMIN — PANTOPRAZOLE SODIUM 40 MG: 40 TABLET, DELAYED RELEASE ORAL at 09:10

## 2018-09-26 RX ADMIN — CETIRIZINE HYDROCHLORIDE 10 MG: 10 TABLET, FILM COATED ORAL at 09:10

## 2018-09-26 RX ADMIN — MORPHINE SULFATE 1 MG: 2 INJECTION, SOLUTION INTRAMUSCULAR; INTRAVENOUS at 11:56

## 2018-09-26 RX ADMIN — METHYLPREDNISOLONE SODIUM SUCCINATE 40 MG: 40 INJECTION, POWDER, FOR SOLUTION INTRAMUSCULAR; INTRAVENOUS at 20:49

## 2018-09-26 RX ADMIN — ATORVASTATIN CALCIUM 20 MG: 20 TABLET, FILM COATED ORAL at 19:47

## 2018-09-26 RX ADMIN — PHENYTOIN SODIUM 200 MG: 100 CAPSULE, EXTENDED RELEASE ORAL at 06:33

## 2018-09-26 RX ADMIN — POLYETHYLENE GLYCOL (3350) 17 G: 17 POWDER, FOR SOLUTION ORAL at 15:55

## 2018-09-26 RX ADMIN — CLOPIDOGREL 75 MG: 75 TABLET, FILM COATED ORAL at 15:53

## 2018-09-26 RX ADMIN — HYDROCODONE BITARTRATE AND ACETAMINOPHEN 1 TABLET: 10; 325 TABLET ORAL at 20:49

## 2018-09-26 RX ADMIN — LISINOPRIL 20 MG: 10 TABLET ORAL at 09:11

## 2018-09-27 LAB
ALBUMIN SERPL-MCNC: 3.9 G/DL (ref 3.5–5)
ALBUMIN/GLOB SERPL: 1.1 G/DL (ref 1.5–2.5)
ALP SERPL-CCNC: 81 U/L (ref 40–129)
ALT SERPL W P-5'-P-CCNC: 23 U/L (ref 10–44)
ANION GAP SERPL CALCULATED.3IONS-SCNC: 9.1 MMOL/L (ref 3.6–11.2)
AST SERPL-CCNC: 18 U/L (ref 10–34)
BASOPHILS # BLD AUTO: 0.01 10*3/MM3 (ref 0–0.3)
BASOPHILS NFR BLD AUTO: 0.1 % (ref 0–2)
BILIRUB SERPL-MCNC: 0.5 MG/DL (ref 0.2–1.8)
BUN BLD-MCNC: 8 MG/DL (ref 7–21)
BUN/CREAT SERPL: 9.6 (ref 7–25)
CALCIUM SPEC-SCNC: 8.9 MG/DL (ref 7.7–10)
CHLORIDE SERPL-SCNC: 105 MMOL/L (ref 99–112)
CO2 SERPL-SCNC: 21.9 MMOL/L (ref 24.3–31.9)
CREAT BLD-MCNC: 0.83 MG/DL (ref 0.43–1.29)
DEPRECATED RDW RBC AUTO: 42.4 FL (ref 37–54)
EOSINOPHIL # BLD AUTO: 0.02 10*3/MM3 (ref 0–0.7)
EOSINOPHIL NFR BLD AUTO: 0.2 % (ref 0–5)
ERYTHROCYTE [DISTWIDTH] IN BLOOD BY AUTOMATED COUNT: 12.5 % (ref 11.5–14.5)
GFR SERPL CREATININE-BSD FRML MDRD: 100 ML/MIN/1.73
GLOBULIN UR ELPH-MCNC: 3.5 GM/DL
GLUCOSE BLD-MCNC: 105 MG/DL (ref 70–110)
HBA1C MFR BLD: 4.9 % (ref 4.5–5.7)
HCT VFR BLD AUTO: 43.3 % (ref 42–52)
HGB BLD-MCNC: 15.1 G/DL (ref 14–18)
IMM GRANULOCYTES # BLD: 0.02 10*3/MM3 (ref 0–0.03)
IMM GRANULOCYTES NFR BLD: 0.2 % (ref 0–0.5)
LYMPHOCYTES # BLD AUTO: 1.32 10*3/MM3 (ref 1–3)
LYMPHOCYTES NFR BLD AUTO: 13.8 % (ref 21–51)
MCH RBC QN AUTO: 33.3 PG (ref 27–33)
MCHC RBC AUTO-ENTMCNC: 34.9 G/DL (ref 33–37)
MCV RBC AUTO: 95.6 FL (ref 80–94)
MONOCYTES # BLD AUTO: 0.81 10*3/MM3 (ref 0.1–0.9)
MONOCYTES NFR BLD AUTO: 8.5 % (ref 0–10)
NEUTROPHILS # BLD AUTO: 7.38 10*3/MM3 (ref 1.4–6.5)
NEUTROPHILS NFR BLD AUTO: 77.2 % (ref 30–70)
OSMOLALITY SERPL CALC.SUM OF ELEC: 270.7 MOSM/KG (ref 273–305)
PLATELET # BLD AUTO: 187 10*3/MM3 (ref 130–400)
PMV BLD AUTO: 10.2 FL (ref 6–10)
POTASSIUM BLD-SCNC: 4 MMOL/L (ref 3.5–5.3)
PROT SERPL-MCNC: 7.4 G/DL (ref 6–8)
RBC # BLD AUTO: 4.53 10*6/MM3 (ref 4.7–6.1)
SODIUM BLD-SCNC: 136 MMOL/L (ref 135–153)
WBC NRBC COR # BLD: 9.56 10*3/MM3 (ref 4.5–12.5)

## 2018-09-27 PROCEDURE — 80053 COMPREHEN METABOLIC PANEL: CPT | Performed by: NURSE PRACTITIONER

## 2018-09-27 PROCEDURE — 99232 SBSQ HOSP IP/OBS MODERATE 35: CPT | Performed by: INTERNAL MEDICINE

## 2018-09-27 PROCEDURE — 94799 UNLISTED PULMONARY SVC/PX: CPT

## 2018-09-27 PROCEDURE — 25010000002 METHYLPREDNISOLONE PER 40 MG: Performed by: PHYSICIAN ASSISTANT

## 2018-09-27 PROCEDURE — G0378 HOSPITAL OBSERVATION PER HR: HCPCS

## 2018-09-27 PROCEDURE — 83036 HEMOGLOBIN GLYCOSYLATED A1C: CPT | Performed by: INTERNAL MEDICINE

## 2018-09-27 PROCEDURE — 25010000002 ENOXAPARIN PER 10 MG: Performed by: INTERNAL MEDICINE

## 2018-09-27 PROCEDURE — 99232 SBSQ HOSP IP/OBS MODERATE 35: CPT | Performed by: PHYSICIAN ASSISTANT

## 2018-09-27 PROCEDURE — 63710000001 DIPHENHYDRAMINE PER 50 MG: Performed by: INTERNAL MEDICINE

## 2018-09-27 PROCEDURE — 25010000002 METHYLPREDNISOLONE PER 40 MG: Performed by: INTERNAL MEDICINE

## 2018-09-27 PROCEDURE — 85025 COMPLETE CBC W/AUTO DIFF WBC: CPT | Performed by: NURSE PRACTITIONER

## 2018-09-27 PROCEDURE — 63710000001 DIPHENHYDRAMINE PER 50 MG: Performed by: PHYSICIAN ASSISTANT

## 2018-09-27 RX ORDER — DIPHENHYDRAMINE HCL 25 MG
25 CAPSULE ORAL ONCE
Status: COMPLETED | OUTPATIENT
Start: 2018-09-28 | End: 2018-09-28

## 2018-09-27 RX ORDER — DIPHENHYDRAMINE HCL 25 MG
25 CAPSULE ORAL ONCE
Status: COMPLETED | OUTPATIENT
Start: 2018-09-27 | End: 2018-09-27

## 2018-09-27 RX ORDER — METHYLPREDNISOLONE SODIUM SUCCINATE 40 MG/ML
40 INJECTION, POWDER, LYOPHILIZED, FOR SOLUTION INTRAMUSCULAR; INTRAVENOUS EVERY 8 HOURS
Status: COMPLETED | OUTPATIENT
Start: 2018-09-27 | End: 2018-09-28

## 2018-09-27 RX ADMIN — METHYLPREDNISOLONE SODIUM SUCCINATE 40 MG: 40 INJECTION, POWDER, FOR SOLUTION INTRAMUSCULAR; INTRAVENOUS at 05:19

## 2018-09-27 RX ADMIN — PANTOPRAZOLE SODIUM 40 MG: 40 TABLET, DELAYED RELEASE ORAL at 07:36

## 2018-09-27 RX ADMIN — CLONAZEPAM 0.5 MG: 0.5 TABLET ORAL at 12:03

## 2018-09-27 RX ADMIN — ENOXAPARIN SODIUM 40 MG: 100 INJECTION SUBCUTANEOUS at 16:54

## 2018-09-27 RX ADMIN — METHYLPREDNISOLONE SODIUM SUCCINATE 40 MG: 40 INJECTION, POWDER, FOR SOLUTION INTRAMUSCULAR; INTRAVENOUS at 22:11

## 2018-09-27 RX ADMIN — PHENYTOIN SODIUM 200 MG: 100 CAPSULE, EXTENDED RELEASE ORAL at 05:20

## 2018-09-27 RX ADMIN — DIPHENHYDRAMINE HYDROCHLORIDE 25 MG: 25 CAPSULE ORAL at 07:36

## 2018-09-27 RX ADMIN — CLOPIDOGREL 75 MG: 75 TABLET, FILM COATED ORAL at 07:40

## 2018-09-27 RX ADMIN — LISINOPRIL 20 MG: 10 TABLET ORAL at 07:40

## 2018-09-27 RX ADMIN — CETIRIZINE HYDROCHLORIDE 10 MG: 10 TABLET, FILM COATED ORAL at 07:36

## 2018-09-27 RX ADMIN — METHYLPREDNISOLONE SODIUM SUCCINATE 40 MG: 40 INJECTION, POWDER, FOR SOLUTION INTRAMUSCULAR; INTRAVENOUS at 12:42

## 2018-09-27 RX ADMIN — POLYETHYLENE GLYCOL (3350) 17 G: 17 POWDER, FOR SOLUTION ORAL at 07:40

## 2018-09-27 RX ADMIN — DIPHENHYDRAMINE HYDROCHLORIDE 25 MG: 25 CAPSULE ORAL at 22:11

## 2018-09-27 RX ADMIN — ATORVASTATIN CALCIUM 20 MG: 20 TABLET, FILM COATED ORAL at 22:12

## 2018-09-27 RX ADMIN — DILTIAZEM HYDROCHLORIDE 15 MG/HR: 5 INJECTION INTRAVENOUS at 15:34

## 2018-09-27 RX ADMIN — NITROGLYCERIN 1 INCH: 20 OINTMENT TOPICAL at 16:57

## 2018-09-27 RX ADMIN — PHENYTOIN SODIUM 300 MG: 100 CAPSULE, EXTENDED RELEASE ORAL at 16:53

## 2018-09-27 RX ADMIN — DILTIAZEM HYDROCHLORIDE 5 MG/HR: 5 INJECTION INTRAVENOUS at 10:16

## 2018-09-27 RX ADMIN — MONTELUKAST SODIUM 10 MG: 10 TABLET, FILM COATED ORAL at 22:11

## 2018-09-28 VITALS
HEIGHT: 67 IN | DIASTOLIC BLOOD PRESSURE: 94 MMHG | BODY MASS INDEX: 31.16 KG/M2 | OXYGEN SATURATION: 99 % | WEIGHT: 198.5 LBS | SYSTOLIC BLOOD PRESSURE: 137 MMHG | RESPIRATION RATE: 20 BRPM | HEART RATE: 81 BPM | TEMPERATURE: 98.1 F

## 2018-09-28 PROBLEM — I20.0 UNSTABLE ANGINA: Status: ACTIVE | Noted: 2018-09-25

## 2018-09-28 LAB
ANION GAP SERPL CALCULATED.3IONS-SCNC: 9.4 MMOL/L (ref 3.6–11.2)
BASOPHILS # BLD AUTO: 0.01 10*3/MM3 (ref 0–0.3)
BASOPHILS NFR BLD AUTO: 0.1 % (ref 0–2)
BILIRUB UR QL STRIP: NEGATIVE
BUN BLD-MCNC: 13 MG/DL (ref 7–21)
BUN/CREAT SERPL: 13.5 (ref 7–25)
CALCIUM SPEC-SCNC: 9.4 MG/DL (ref 7.7–10)
CHLORIDE SERPL-SCNC: 104 MMOL/L (ref 99–112)
CLARITY UR: CLEAR
CO2 SERPL-SCNC: 22.6 MMOL/L (ref 24.3–31.9)
COLOR UR: YELLOW
CREAT BLD-MCNC: 0.96 MG/DL (ref 0.43–1.29)
DEPRECATED RDW RBC AUTO: 42.3 FL (ref 37–54)
EOSINOPHIL # BLD AUTO: 0 10*3/MM3 (ref 0–0.7)
EOSINOPHIL NFR BLD AUTO: 0 % (ref 0–5)
ERYTHROCYTE [DISTWIDTH] IN BLOOD BY AUTOMATED COUNT: 12.4 % (ref 11.5–14.5)
GFR SERPL CREATININE-BSD FRML MDRD: 85 ML/MIN/1.73
GLUCOSE BLD-MCNC: 134 MG/DL (ref 70–110)
GLUCOSE UR STRIP-MCNC: NEGATIVE MG/DL
HCT VFR BLD AUTO: 46.8 % (ref 42–52)
HGB BLD-MCNC: 16.4 G/DL (ref 14–18)
HGB UR QL STRIP.AUTO: NEGATIVE
IMM GRANULOCYTES # BLD: 0.03 10*3/MM3 (ref 0–0.03)
IMM GRANULOCYTES NFR BLD: 0.2 % (ref 0–0.5)
KETONES UR QL STRIP: NEGATIVE
LEUKOCYTE ESTERASE UR QL STRIP.AUTO: NEGATIVE
LYMPHOCYTES # BLD AUTO: 0.84 10*3/MM3 (ref 1–3)
LYMPHOCYTES NFR BLD AUTO: 5.2 % (ref 21–51)
MCH RBC QN AUTO: 33.7 PG (ref 27–33)
MCHC RBC AUTO-ENTMCNC: 35 G/DL (ref 33–37)
MCV RBC AUTO: 96.1 FL (ref 80–94)
MONOCYTES # BLD AUTO: 0.68 10*3/MM3 (ref 0.1–0.9)
MONOCYTES NFR BLD AUTO: 4.2 % (ref 0–10)
NEUTROPHILS # BLD AUTO: 14.6 10*3/MM3 (ref 1.4–6.5)
NEUTROPHILS NFR BLD AUTO: 90.3 % (ref 30–70)
NITRITE UR QL STRIP: NEGATIVE
OSMOLALITY SERPL CALC.SUM OF ELEC: 274 MOSM/KG (ref 273–305)
PH UR STRIP.AUTO: 5.5 [PH] (ref 5–8)
PLATELET # BLD AUTO: 219 10*3/MM3 (ref 130–400)
PMV BLD AUTO: 10.4 FL (ref 6–10)
POTASSIUM BLD-SCNC: 4.2 MMOL/L (ref 3.5–5.3)
PROT UR QL STRIP: NEGATIVE
RBC # BLD AUTO: 4.87 10*6/MM3 (ref 4.7–6.1)
SODIUM BLD-SCNC: 136 MMOL/L (ref 135–153)
SP GR UR STRIP: >1.03 (ref 1–1.03)
UROBILINOGEN UR QL STRIP: ABNORMAL
WBC NRBC COR # BLD: 16.16 10*3/MM3 (ref 4.5–12.5)

## 2018-09-28 PROCEDURE — G0378 HOSPITAL OBSERVATION PER HR: HCPCS

## 2018-09-28 PROCEDURE — C1760 CLOSURE DEV, VASC: HCPCS | Performed by: INTERNAL MEDICINE

## 2018-09-28 PROCEDURE — 63710000001 DIPHENHYDRAMINE PER 50 MG: Performed by: PHYSICIAN ASSISTANT

## 2018-09-28 PROCEDURE — C1769 GUIDE WIRE: HCPCS | Performed by: INTERNAL MEDICINE

## 2018-09-28 PROCEDURE — 81003 URINALYSIS AUTO W/O SCOPE: CPT | Performed by: PHYSICIAN ASSISTANT

## 2018-09-28 PROCEDURE — 25010000002 INFLUENZA VAC SPLIT QUAD 0.5 ML SUSPENSION PREFILLED SYRINGE: Performed by: INTERNAL MEDICINE

## 2018-09-28 PROCEDURE — 99024 POSTOP FOLLOW-UP VISIT: CPT | Performed by: INTERNAL MEDICINE

## 2018-09-28 PROCEDURE — 0 IOPAMIDOL PER 1 ML: Performed by: INTERNAL MEDICINE

## 2018-09-28 PROCEDURE — 25010000002 DIPHENHYDRAMINE PER 50 MG: Performed by: INTERNAL MEDICINE

## 2018-09-28 PROCEDURE — 25010000002 ENOXAPARIN PER 10 MG: Performed by: INTERNAL MEDICINE

## 2018-09-28 PROCEDURE — 4A023N7 MEASUREMENT OF CARDIAC SAMPLING AND PRESSURE, LEFT HEART, PERCUTANEOUS APPROACH: ICD-10-PCS | Performed by: INTERNAL MEDICINE

## 2018-09-28 PROCEDURE — B2151ZZ FLUOROSCOPY OF LEFT HEART USING LOW OSMOLAR CONTRAST: ICD-10-PCS | Performed by: INTERNAL MEDICINE

## 2018-09-28 PROCEDURE — 85025 COMPLETE CBC W/AUTO DIFF WBC: CPT | Performed by: PHYSICIAN ASSISTANT

## 2018-09-28 PROCEDURE — 63710000001 DIPHENHYDRAMINE PER 50 MG: Performed by: INTERNAL MEDICINE

## 2018-09-28 PROCEDURE — 90686 IIV4 VACC NO PRSV 0.5 ML IM: CPT | Performed by: INTERNAL MEDICINE

## 2018-09-28 PROCEDURE — 25010000002 HEPARIN (PORCINE) PER 1000 UNITS: Performed by: INTERNAL MEDICINE

## 2018-09-28 PROCEDURE — 80048 BASIC METABOLIC PNL TOTAL CA: CPT | Performed by: PHYSICIAN ASSISTANT

## 2018-09-28 PROCEDURE — G0008 ADMIN INFLUENZA VIRUS VAC: HCPCS | Performed by: INTERNAL MEDICINE

## 2018-09-28 PROCEDURE — 93458 L HRT ARTERY/VENTRICLE ANGIO: CPT | Performed by: INTERNAL MEDICINE

## 2018-09-28 PROCEDURE — 25010000002 METHYLPREDNISOLONE PER 40 MG: Performed by: PHYSICIAN ASSISTANT

## 2018-09-28 PROCEDURE — 25010000002 HYDROCORTISONE SODIUM SUCCINATE 100 MG RECONSTITUTED SOLUTION: Performed by: INTERNAL MEDICINE

## 2018-09-28 PROCEDURE — 99239 HOSP IP/OBS DSCHRG MGMT >30: CPT | Performed by: PHYSICIAN ASSISTANT

## 2018-09-28 PROCEDURE — C1894 INTRO/SHEATH, NON-LASER: HCPCS | Performed by: INTERNAL MEDICINE

## 2018-09-28 PROCEDURE — B2111ZZ FLUOROSCOPY OF MULTIPLE CORONARY ARTERIES USING LOW OSMOLAR CONTRAST: ICD-10-PCS | Performed by: INTERNAL MEDICINE

## 2018-09-28 RX ORDER — FAMOTIDINE 10 MG/ML
20 INJECTION, SOLUTION INTRAVENOUS ONCE
Status: DISCONTINUED | OUTPATIENT
Start: 2018-09-28 | End: 2018-09-28

## 2018-09-28 RX ORDER — ASPIRIN 81 MG/1
81 TABLET ORAL DAILY
Qty: 30 TABLET | Refills: 0 | Status: SHIPPED | OUTPATIENT
Start: 2018-09-28 | End: 2018-10-28

## 2018-09-28 RX ORDER — HEPARIN SODIUM 1000 [USP'U]/ML
INJECTION, SOLUTION INTRAVENOUS; SUBCUTANEOUS AS NEEDED
Status: DISCONTINUED | OUTPATIENT
Start: 2018-09-28 | End: 2018-09-28 | Stop reason: HOSPADM

## 2018-09-28 RX ORDER — SODIUM CHLORIDE 9 MG/ML
INJECTION, SOLUTION INTRAVENOUS
Status: COMPLETED
Start: 2018-09-28 | End: 2018-09-28

## 2018-09-28 RX ORDER — SODIUM CHLORIDE 9 MG/ML
125 INJECTION, SOLUTION INTRAVENOUS CONTINUOUS
Status: DISCONTINUED | OUTPATIENT
Start: 2018-09-28 | End: 2018-09-28 | Stop reason: HOSPADM

## 2018-09-28 RX ORDER — LIDOCAINE HYDROCHLORIDE 20 MG/ML
INJECTION, SOLUTION INFILTRATION; PERINEURAL AS NEEDED
Status: DISCONTINUED | OUTPATIENT
Start: 2018-09-28 | End: 2018-09-28 | Stop reason: HOSPADM

## 2018-09-28 RX ORDER — DOCUSATE SODIUM 100 MG/1
100 CAPSULE, LIQUID FILLED ORAL 2 TIMES DAILY
Status: DISCONTINUED | OUTPATIENT
Start: 2018-09-28 | End: 2018-09-28 | Stop reason: HOSPADM

## 2018-09-28 RX ORDER — DIAZEPAM 5 MG/1
5 TABLET ORAL ONCE
Status: COMPLETED | OUTPATIENT
Start: 2018-09-28 | End: 2018-09-28

## 2018-09-28 RX ORDER — DIPHENHYDRAMINE HYDROCHLORIDE 50 MG/ML
INJECTION INTRAMUSCULAR; INTRAVENOUS AS NEEDED
Status: DISCONTINUED | OUTPATIENT
Start: 2018-09-28 | End: 2018-09-28 | Stop reason: HOSPADM

## 2018-09-28 RX ORDER — SODIUM CHLORIDE 9 MG/ML
INJECTION, SOLUTION INTRAVENOUS CONTINUOUS PRN
Status: COMPLETED | OUTPATIENT
Start: 2018-09-28 | End: 2018-09-28

## 2018-09-28 RX ORDER — PSEUDOEPHEDRINE HCL 30 MG
100 TABLET ORAL 2 TIMES DAILY
Qty: 60 CAPSULE | Refills: 0 | Status: SHIPPED | OUTPATIENT
Start: 2018-09-28 | End: 2018-11-02

## 2018-09-28 RX ORDER — DIPHENHYDRAMINE HYDROCHLORIDE 50 MG/ML
50 INJECTION INTRAMUSCULAR; INTRAVENOUS ONCE
Status: DISCONTINUED | OUTPATIENT
Start: 2018-09-28 | End: 2018-09-28

## 2018-09-28 RX ORDER — DIPHENHYDRAMINE HCL 25 MG
25 CAPSULE ORAL EVERY 6 HOURS PRN
Status: DISCONTINUED | OUTPATIENT
Start: 2018-09-28 | End: 2018-09-28

## 2018-09-28 RX ADMIN — PHENYTOIN SODIUM 300 MG: 100 CAPSULE, EXTENDED RELEASE ORAL at 17:00

## 2018-09-28 RX ADMIN — PHENYTOIN SODIUM 200 MG: 100 CAPSULE, EXTENDED RELEASE ORAL at 07:00

## 2018-09-28 RX ADMIN — SODIUM CHLORIDE 125 ML/HR: 9 INJECTION, SOLUTION INTRAVENOUS at 14:52

## 2018-09-28 RX ADMIN — NITROGLYCERIN 1 INCH: 20 OINTMENT TOPICAL at 18:00

## 2018-09-28 RX ADMIN — DIPHENHYDRAMINE HYDROCHLORIDE 25 MG: 25 CAPSULE ORAL at 08:06

## 2018-09-28 RX ADMIN — POLYETHYLENE GLYCOL (3350) 17 G: 17 POWDER, FOR SOLUTION ORAL at 09:00

## 2018-09-28 RX ADMIN — METHYLPREDNISOLONE SODIUM SUCCINATE 40 MG: 40 INJECTION, POWDER, FOR SOLUTION INTRAMUSCULAR; INTRAVENOUS at 15:29

## 2018-09-28 RX ADMIN — NITROGLYCERIN 1 INCH: 20 OINTMENT TOPICAL at 15:29

## 2018-09-28 RX ADMIN — DOCUSATE SODIUM 100 MG: 100 CAPSULE ORAL at 13:27

## 2018-09-28 RX ADMIN — FAMOTIDINE 20 MG: 10 INJECTION, SOLUTION INTRAVENOUS at 13:27

## 2018-09-28 RX ADMIN — HYDROCODONE BITARTRATE AND ACETAMINOPHEN 1 TABLET: 10; 325 TABLET ORAL at 01:46

## 2018-09-28 RX ADMIN — MAGNESIUM HYDROXIDE 15 ML: 2400 SUSPENSION ORAL at 13:26

## 2018-09-28 RX ADMIN — ENOXAPARIN SODIUM 40 MG: 100 INJECTION SUBCUTANEOUS at 18:00

## 2018-09-28 RX ADMIN — INFLUENZA VIRUS VACCINE 0.5 ML: 15; 15; 15; 15 SUSPENSION INTRAMUSCULAR at 13:27

## 2018-09-28 RX ADMIN — DIAZEPAM 5 MG: 5 TABLET ORAL at 08:02

## 2018-09-28 RX ADMIN — SODIUM CHLORIDE 125 ML/HR: 9 INJECTION, SOLUTION INTRAVENOUS at 12:11

## 2018-09-28 RX ADMIN — METHYLPREDNISOLONE SODIUM SUCCINATE 40 MG: 40 INJECTION, POWDER, FOR SOLUTION INTRAMUSCULAR; INTRAVENOUS at 04:14

## 2018-09-29 ENCOUNTER — READMISSION MANAGEMENT (OUTPATIENT)
Dept: CALL CENTER | Facility: HOSPITAL | Age: 48
End: 2018-09-29

## 2018-09-29 NOTE — OUTREACH NOTE
Prep Survey      Responses   Facility patient discharged from?  [facility]   Is patient eligible?  Yes   Discharge diagnosis  Chest Pain   Does the patient have one of the following disease processes/diagnoses(primary or secondary)?  Other   Does the patient have Home health ordered?  No   Is there a DME ordered?  No   Comments regarding appointments  See AVS   Prep survey completed?  Yes          Aviva Hurd RN

## 2018-10-01 ENCOUNTER — READMISSION MANAGEMENT (OUTPATIENT)
Dept: CALL CENTER | Facility: HOSPITAL | Age: 48
End: 2018-10-01

## 2018-10-01 ENCOUNTER — TRANSITIONAL CARE MANAGEMENT TELEPHONE ENCOUNTER (OUTPATIENT)
Dept: FAMILY MEDICINE CLINIC | Facility: CLINIC | Age: 48
End: 2018-10-01

## 2018-10-01 NOTE — OUTREACH NOTE
Medical Week 1 Survey      Responses   Facility patient discharged from?  [facility]   Does the patient have one of the following disease processes/diagnoses(primary or secondary)?  Other   Is there a successful TCM telephone encounter documented?  Yes          Kaity Andrews RN

## 2018-10-01 NOTE — OUTREACH NOTE
The patient was discharged from [facility] 9/28/18 and has a hospital follow up scheduled with PCP Tiera ALEXANDRE 9/28/18 which is within 2 business days of hospital discharge.

## 2018-10-02 ENCOUNTER — OFFICE VISIT (OUTPATIENT)
Dept: FAMILY MEDICINE CLINIC | Facility: CLINIC | Age: 48
End: 2018-10-02

## 2018-10-02 VITALS
DIASTOLIC BLOOD PRESSURE: 88 MMHG | OXYGEN SATURATION: 99 % | HEART RATE: 96 BPM | SYSTOLIC BLOOD PRESSURE: 140 MMHG | BODY MASS INDEX: 31.23 KG/M2 | TEMPERATURE: 97.9 F | WEIGHT: 199 LBS | HEIGHT: 67 IN

## 2018-10-02 DIAGNOSIS — M54.42 CHRONIC BILATERAL LOW BACK PAIN WITH LEFT-SIDED SCIATICA: ICD-10-CM

## 2018-10-02 DIAGNOSIS — M94.262 CHONDROMALACIA OF LEFT KNEE: ICD-10-CM

## 2018-10-02 DIAGNOSIS — Z09 ENCOUNTER FOR EXAMINATION FOLLOWING TREATMENT AT HOSPITAL: Primary | ICD-10-CM

## 2018-10-02 DIAGNOSIS — F41.1 GENERALIZED ANXIETY DISORDER: ICD-10-CM

## 2018-10-02 DIAGNOSIS — R79.89 ELEVATED PROLACTIN LEVEL: ICD-10-CM

## 2018-10-02 DIAGNOSIS — R06.02 SHORTNESS OF BREATH: ICD-10-CM

## 2018-10-02 DIAGNOSIS — Z23 ENCOUNTER FOR IMMUNIZATION: ICD-10-CM

## 2018-10-02 DIAGNOSIS — G89.29 CHRONIC BILATERAL LOW BACK PAIN WITH LEFT-SIDED SCIATICA: ICD-10-CM

## 2018-10-02 DIAGNOSIS — R06.83 SNORING: ICD-10-CM

## 2018-10-02 DIAGNOSIS — R53.83 OTHER FATIGUE: ICD-10-CM

## 2018-10-02 DIAGNOSIS — I10 ESSENTIAL HYPERTENSION: ICD-10-CM

## 2018-10-02 DIAGNOSIS — K59.01 SLOW TRANSIT CONSTIPATION: ICD-10-CM

## 2018-10-02 LAB — PHENYTOIN FREE SERPL-MCNC: 1.2 UG/ML (ref 1–2)

## 2018-10-02 PROCEDURE — 90471 IMMUNIZATION ADMIN: CPT | Performed by: NURSE PRACTITIONER

## 2018-10-02 PROCEDURE — 99214 OFFICE O/P EST MOD 30 MIN: CPT | Performed by: NURSE PRACTITIONER

## 2018-10-02 PROCEDURE — 90670 PCV13 VACCINE IM: CPT | Performed by: NURSE PRACTITIONER

## 2018-10-02 RX ORDER — CLONAZEPAM 0.5 MG/1
0.5 TABLET ORAL DAILY PRN
Qty: 10 TABLET | Refills: 0 | Status: SHIPPED | OUTPATIENT
Start: 2018-10-02 | End: 2018-11-02 | Stop reason: SDUPTHER

## 2018-10-02 RX ORDER — HYDROCODONE BITARTRATE AND ACETAMINOPHEN 10; 325 MG/1; MG/1
1 TABLET ORAL 2 TIMES DAILY PRN
Qty: 60 TABLET | Refills: 0 | Status: SHIPPED | OUTPATIENT
Start: 2018-10-02 | End: 2018-11-02 | Stop reason: SDUPTHER

## 2018-10-02 RX ORDER — POLYETHYLENE GLYCOL 3350 17 G/17G
17 POWDER, FOR SOLUTION ORAL 2 TIMES DAILY
Qty: 527 G | Refills: 5 | Status: SHIPPED | OUTPATIENT
Start: 2018-10-02 | End: 2018-11-02

## 2018-10-02 NOTE — ASSESSMENT & PLAN NOTE
Mild elevation today.  Continue Lisinopril as ordered.  Ambulatory BP monitoring either at home or random community checks.  Patient to report continued elevations >140/90.  Patient may come by office for checks if needed.

## 2018-10-02 NOTE — PROGRESS NOTES
"Transitional Care Follow Up Visit  Subjective     Jaquan Mckay is a 47 y.o. male who presents for a transitional care management visit.    Within 48 business hours after discharge he was contacted via telephone by support staff from [facility] to coordinate his care and needs.      I reviewed and discussed the details of that call along with the discharge summary, hospital problems, inpatient lab results, inpatient diagnostic studies, and consultation reports with Jaquan.     Current outpatient and discharge medications have been reconciled for the patient.    Date of TCM Phone Call 10/1/2018   [facility]   Date of Admission 2018   Date of Discharge 2018   Discharge Disposition Home or Self Care     Risk for Readmission (LACE) Score: 9 (2018  6:24 AM)    History of Present Illness     Course During Hospital Stay:  Patient admitted on 2018 and was discharged on 2018.  He was admitted for CP that was ruled to be non cardiac.  He did have a cardiac cath while in the hospital.  He reports he was \"smothering bad\"  With pressure sensation on his chest prior to his arrival to the ED.  He reports he had been having the CP on  as well.  He had noted some increase in fatigue before the episode.  He did report to the ED and was noted to have EKG changes.  He was discharged stable and on some meds for constipation.   He does not have any new concerns related to his hospitalization.    Elevated Prolactin-noted on his last labs.  He does have history of head injury and seizure disorder.  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   He has been under the care of Dr Licea who is now .  He has not seen a Neuro in \"years\" but was under care of Dr Jewell.  Ongoing.    Left Knee pain-continues to be present.  Reports his knee gave way this past week and he fell down into his living room floor.  He reports he landed " "directly down onto his knees.  He had surgery approx 1 year ago and pain has been increasing over that period of time.  He has also followed up with Dr Dubin for a second opinion.  Jaquan reports he is having continued pain near his \"cap\" and numbness in his leg.  Not at goal.   Bruising-multiple areas of bruising due to venipuncture and recent hospitalization.  Bilateral forearms is the worse.  Reports groin bruising due to heart cath.  Generalized bruising on his legs due to recent fall.  Not at goal.   Breathing-it was recommended for him to have a sleep study due to poor \"breathing\" at [facility].  Sleeps on 3 pillows and runs a fan.  Has been on O2 in the past but is not presently on.  Snoring at times.  Always fatigued.  Not at goal.      The following portions of the patient's history were reviewed and updated as appropriate: allergies, current medications, past family history, past medical history, past social history, past surgical history and problem list.    Review of Systems   Constitutional: Positive for fatigue. Negative for appetite change and unexpected weight change.   HENT: Negative for congestion, ear pain, nosebleeds, postnasal drip, rhinorrhea, sinus pain, sinus pressure, sore throat, trouble swallowing and voice change.    Eyes: Negative for pain and visual disturbance.   Respiratory: Positive for cough. Negative for chest tightness, shortness of breath and wheezing.    Cardiovascular: Positive for chest pain and leg swelling. Negative for palpitations.   Gastrointestinal: Negative for abdominal pain, constipation, diarrhea and vomiting.   Endocrine: Negative for cold intolerance and polydipsia.   Genitourinary: Negative for difficulty urinating, dysuria, flank pain and hematuria.   Musculoskeletal: Positive for arthralgias, back pain and joint swelling. Negative for gait problem and myalgias.   Skin: Negative for color change and rash.   Allergic/Immunologic: Negative.    Neurological: Positive for " dizziness, weakness, numbness and headaches. Negative for seizures and syncope.   Hematological: Bruises/bleeds easily.   Psychiatric/Behavioral: Positive for agitation, decreased concentration, dysphoric mood and sleep disturbance. Negative for suicidal ideas. The patient is nervous/anxious.    All other systems reviewed and are negative.      Objective   Physical Exam   Constitutional: He is oriented to person, place, and time. He appears well-developed and well-nourished.   HENT:   Head: Normocephalic and atraumatic.   Right Ear: External ear and ear canal normal. Tympanic membrane is not erythematous.   Left Ear: External ear and ear canal normal. Tympanic membrane is not erythematous.   Nose: Mucosal edema and rhinorrhea present.   Mouth/Throat: No oropharyngeal exudate or posterior oropharyngeal erythema.   Eyes: Pupils are equal, round, and reactive to light. Conjunctivae and EOM are normal. No scleral icterus.   Neck: Normal range of motion. Neck supple. No JVD present. No thyromegaly present.   Cardiovascular: Normal rate, regular rhythm and normal heart sounds.    No murmur heard.  Pulmonary/Chest: Effort normal. No respiratory distress. He has no decreased breath sounds. He has no wheezes. He exhibits no tenderness.   Abdominal: Soft. Bowel sounds are normal. He exhibits no mass. There is no tenderness.   Musculoskeletal: He exhibits tenderness. He exhibits no edema.        Right elbow: Tenderness found. Medial epicondyle and olecranon process tenderness noted.        Left knee: He exhibits decreased range of motion and swelling (mild). He exhibits no ecchymosis. Tenderness found. Medial joint line and lateral joint line tenderness noted.        Lumbar back: He exhibits decreased range of motion, tenderness, pain and spasm. He exhibits no swelling.        Right hand: He exhibits tenderness (3rd digit). He exhibits normal capillary refill.     Skin Integrity  -  His right foot skin is intact.His left foot  skin is intact..  Lymphadenopathy:        Head (right side): No submandibular adenopathy present.        Head (left side): No submandibular adenopathy present.     He has no cervical adenopathy.   Neurological: He is alert and oriented to person, place, and time. He has normal reflexes. No cranial nerve deficit. He exhibits normal muscle tone. Coordination normal.   Skin: Skin is warm and dry. Capillary refill takes less than 2 seconds. Ecchymosis noted.   Psychiatric: His speech is normal. Judgment and thought content normal. His mood appears anxious. He is slowed. He is not actively hallucinating. Cognition and memory are normal. He exhibits a depressed mood. He expresses no homicidal and no suicidal ideation. He exhibits normal recent memory and normal remote memory.   Flat affect He is attentive.   Vitals reviewed.      Assessment/Plan   Jaquan was seen today for anxiety, knee pain and follow up [facility].    Diagnoses and all orders for this visit:    Encounter for examination following treatment at [facility]    Elevated prolactin level (CMS/HCC)  -     Ambulatory Referral to Endocrinology  -     Ambulatory Referral to Neurology    Other fatigue  -     Ambulatory Referral to Sleep Lab    Shortness of breath  -     Ambulatory Referral to Sleep Lab    Snoring  -     Ambulatory Referral to Sleep Lab    Generalized anxiety disorder  -     Ambulatory Referral to Psychiatry  -     clonazePAM (KlonoPIN) 0.5 MG tablet; Take 1 tablet by mouth Daily As Needed for Anxiety.    Chronic bilateral low back pain with left-sided sciatica  -     HYDROcodone-acetaminophen (NORCO)  MG per tablet; Take 1 tablet by mouth 2 (Two) Times a Day As Needed for Moderate Pain .    Chondromalacia of left knee  -     HYDROcodone-acetaminophen (NORCO)  MG per tablet; Take 1 tablet by mouth 2 (Two) Times a Day As Needed for Moderate Pain .    Essential hypertension    Slow transit constipation  -     polyethylene glycol (MIRALAX) packet;  Take 17 g by mouth 2 (Two) Times a Day.    Encounter for immunization  -     Pneumococcal Conjugate Vaccine 13-Valent All (PCV13)    Current outpatient and discharge medications have been reconciled for the patient.  Reviewed by: BALDOMERO Thao  Hospital records reviewed.  Discussed any specific concerns patient had regarding testing, labs, Etc.   Referral as above to speciality providers.  CHAVEZ reviewed today and consistent.  Will refill prescribed controlled medication today.  Patient is aware they cannot receive narcotics from any other provider except if under care of pain management or speciality clinic.  Risk and benefits of medication use has been reviewed.  History and physical exam exhibit continued safe and appropriate use of controlled substances.  Flu vaccine administered while in [facility] as inpatient.    RTC 1 month, sooner if needed.

## 2018-10-03 NOTE — PAYOR COMM NOTE
"[facility]  NPI:[id number]    Utilization Review  Contact: Letty Mccarty RN  Phone: [phone]  Fax:[phone]    ATTENTION JOSE GRADY  CATH REPORT     Jaquan Smith (47 y.o. Male)     Date of Birth Social Security Number Address Home Phone MRN    1970  [address]  [facility] [address] [phone] [id number]    [employer] Marital Status          None        Admission Date Admission Type Admitting Provider Attending Provider Department, Room/Bed    9/25/18 Emergency Liliam Rowley MD  [facility] 3 SOUTH, 3307/1S    Discharge Date Discharge Disposition Discharge Destination        9/28/2018 Home or Self Care              Attending Provider:  (none)   Allergies:  Ciprofloxacin, Paxil [Paroxetine Hcl], Peanut-containing Drug Products, Penicillins, Sulfa Antibiotics, Isosorbide Nitrate, Fish-derived Products, Mobic [Meloxicam], Robitussin Cough+ Chest Max St [Dextromethorphan-guaifenesin], Zoloft [Sertraline Hcl], Contrast Dye, Diltiazem, Gabapentin, Keflex [Cephalexin], Metoprolol, Prednisone, Shrimp (Diagnostic), Spironolactone    Isolation:  None   Infection:  None   Code Status:  Prior    Ht:  170.2 cm (67\")   Wt:  90 kg (198 lb 8 oz)    Admission Cmt:  None   Principal Problem:  Chest pain [R07.9]                 Active Insurance as of 9/25/2018     Primary Coverage     Payor Plan Insurance Group Employer/Plan Group    [employer] [place] [employer] [place]ICA[place]      Payor Plan Address Payor Plan Phone Number Effective From Effective To    [address] [address] [phone] 4/18/2016     [facility] [id number]       Subscriber Name Subscriber Birth Date Member ID       JAUQAN SMITH 1970 [id number]                 Emergency Contacts      (Rel.) Home Phone Work Phone Mobile Phone    Will Smith (Mother) [phone]          Jaquan Smith   Cardiac Catheterization/Vascular Study   Order# [id number]   Reading physician: Leandro Daily, " MD Ordering physician: Leandro Daily MD Study date: 18    Procedures   Left Heart Cath      Patient Information   Patient Name  Jaquan Mckay MRN  [id number] Sex  Male  (Age)  1970 (47 y.o.)   Race Ethnicity Encounter Category   White or  Not  or  Emergency   Patient Hx Of Height, Weight, and Vitals   Height Weight BSA (Calculated - sq m) BMI (kg/m2) Pulse BP       87 150/90   Physicians   Panel Physicians Referring Physician Case Authorizing Physician   Leandro Daily MD (Primary)  Leandro Daily MD   Indications   Unstable angina (CMS/formerly [facility]) [I20.0 (ICD-10-CM)]   Pre-procedure Diagnosis   Unstable angina       Conclusion   Procedure performed: Left heart catheterization with selective coronary angiography and LV angiography.  Date of procedure: 2018  Indication for the procedure: Mr. Mckay is a 46-year-old  male  the risk factors for coronary artery disease and persistent chest pain suspicious for unstable angina.  Hence he was given the option of further cardiac evaluation with cardiac catheterization to rule out any underlying significant coronary artery disease and to help with decisions regarding further management.  The procedure of cardiac catheterization, potential risks, benefits and alternative methods of management have been explained to Mr. Mckay and he expressed understanding of the same and is wanting to proceed.  An informed consent was obtained.  A timeout was performed prior to starting the procedure.     Procedure performed by: Leandro Daily M.D. [facility]     Procedure report: After obtaining informed consent, Mr. Mckay was identified and was brought onto chronic catheterization laboratory and was prepped and draped in sterile fashion over the right inguinal area.  The area was anesthetized with local 2% infiltration.  Access was obtained into the right femoral artery using 18-gauge Cook needle.  A 5 Telugu sheath was placed into the right femoral  artery.  A 5 Fijian Obdulio left 4 coronary catheter was advanced over the guidewire under fluoroscopy guidance and was engaged into the left coronary ostium and left coronary angiography was performed.  This catheter was then exchanged with 5 Fijian 3 DRC catheter which was engaged into the right coronary ostium and right coronary angiography was performed in multiple projections.  The left circumflex coronary artery seem to get is from the right coronary cusp which was not initially adequately visualized.  Hence this catheter was then exchanged with 5 Fijian Obdulio right and then 5 Fijian AR modified catheter with which subsequent injections of the left circumflex coronary artery was performed.  This catheter was then exchanged with 5 Fijian angle pigtail catheter that was in advanced into the left ventricle and LV pressures were obtained.  LV angiography was performed using 30 cc of contrast.  Pullback pressures were obtained across the aortic valve.  At the end of the procedure the catheter and sheath were removed and adequate hemostasis was obtained using a mixed device.  Patient tolerated the procedure well without any immediate complications.  For further details of the procedure please review the record maintained by the monitor tech.     Estimated blood loss: 5 cc        Total contrast used: Isovue about 100 cc.     Results:     Hemodynamics:     LVEDP was about 22 mmHg.  Prengiography and did not change significantly post angiography and there was no significant gradient across the aortic valve noted on pullback.     Coronary angiograms:     On fluoroscopy there was no significant epicardial calcification noted.     Left main coronary artery gives rise to only left anterior descending coronary artery from the left coronary cusp  Left anterior descending coronary artery gives us to medium-sized diagonal branch from the proximal segment and a small diagonal branch from the midsegment with overall minimal  plaquing disease noted in the mid and distal LAD.  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  Right coronary artery is a medium caliber vessel and is dominant for posterior circulation.  It appears angiographically normal giving rise to a medium size posterior descending artery and a medium-sized posterior lateral branch.     LV angiography was performed in ARVIZU projection.  It reveals normal LV chamber size, wall motion and systolic function with an estimated LV ejection fraction of about 60-65%.  There is no pericardial or valvular calcification noted.  There is no evidence of mitral valve prolapse or regurgitation noted.     Conclusion and comments: Mr. Mckay is a 47-year-old  male with persistent chest pains that had some features of unstable angina.  His cardiac catheterization revealed:     1. Left main coronary artery giving rise to only left anterior descending coronary artery from the left coronary cusp and appears normal.  2. Left anterior descending coronary artery had minimal plaquing disease in the mid and distal segments.  3. 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  4. Right coronary artery is dominant for posterior circulation appears angiographically normal.  5. Left ventricular chamber size, wall motion and systolic function are normal with an estimated LV ejection fraction of about 60-60%.     Recommendations: In view of mild degree of atherosclerotic disease, it appears that his chest pains are noncardiac in etiology at this time.  For now would continue to emphasize on risk factor modification as needed and looking into noncardiac causes for his symptoms should they persist.     Leandro Daily M.D. [facility]      Radiation      Event Details User   11:22 AM Radiation Tracking Cumulative Air  Kerma: Total Dose (mGy) = 668.000  Physician: Leandro Daily MD  Dose (mGy) = 668.000  Fluoro Time (mins) = 6.6 JL   Medications   As of 09/28/18 1128   (Filter: BH CV CONTRAST GROUPER Medications Shown)   iopamidol (ISOVUE-370) 76 % injection (mL)

## 2018-10-05 ENCOUNTER — READMISSION MANAGEMENT (OUTPATIENT)
Dept: CALL CENTER | Facility: HOSPITAL | Age: 48
End: 2018-10-05

## 2018-10-05 NOTE — OUTREACH NOTE
Medical Week 1 Survey      Responses   Facility patient discharged from?  [facility]   Does the patient have one of the following disease processes/diagnoses(primary or secondary)?  Other   Is there a successful TCM telephone encounter documented?  Yes          Cherry Coleman RN

## 2018-10-10 ENCOUNTER — READMISSION MANAGEMENT (OUTPATIENT)
Dept: CALL CENTER | Facility: HOSPITAL | Age: 48
End: 2018-10-10

## 2018-10-10 NOTE — OUTREACH NOTE
Medical Week 2 Survey      Responses   Facility patient discharged from?  [facility]   Does the patient have one of the following disease processes/diagnoses(primary or secondary)?  Other   Week 2 attempt successful?  Yes   Call start time  1002   Call end time  1034   Is patient permission given to speak with other caregiver?  No   Medication alerts for this patient  He said did not get plavix, did not see order for this   Meds reviewed with patient/caregiver?  Yes   Does the patient have all medications ordered at discharge?  No   What is keeping the patient from filling the prescriptions?  Lost script/didn't receive   Nursing Interventions  Nurse advised patient to call provider   Notified Case Management  Script issues   Prescription comments  email sent to Case Management   Is the patient taking all medications as directed (includes completed medication regime)?  No   What is preventing the patient from taking all medications as directed?  Other   Nursing Interventions  Nurse provided patient education, Advised patient to call provider   Medication comments  states did get asa but no plavix, I looked over AVS and could not see where he had received Plavix or needed a prescription for it, he will follow up with  his Dr.    Comments regarding appointments  Has seen PCP already   Does the patient have a primary care provider?   Yes   Does the patient have an appointment with their PCP within 7 days of discharge?  Yes   Comments regarding PCP  Tiera Valenzuela   Has the patient kept scheduled appointments due by today?  Yes   Has home health visited the patient within 72 hours of discharge?  N/A   Comments  Emailed Casemanagement to look into medication issues   Did the patient receive a copy of their discharge instructions?  Yes   Nursing interventions  Reviewed instructions with patient, Educated on MyChart   What is the patient's perception of their health status since discharge?  Improving   Is the patient/caregiver  able to teach back signs and symptoms related to disease process for when to call PCP?  Yes   Is the patient/caregiver able to teach back signs and symptoms related to disease process for when to call 911?  Yes   Week 2 Call Completed?  Yes   Wrap up additional comments  emailed Case management about med issues          Aretha Celis, RN

## 2018-10-16 ENCOUNTER — PRIOR AUTHORIZATION (OUTPATIENT)
Dept: PSYCHIATRY | Facility: CLINIC | Age: 48
End: 2018-10-16

## 2018-10-16 ENCOUNTER — OFFICE VISIT (OUTPATIENT)
Dept: PSYCHIATRY | Facility: CLINIC | Age: 48
End: 2018-10-16

## 2018-10-16 VITALS
HEIGHT: 67 IN | DIASTOLIC BLOOD PRESSURE: 75 MMHG | HEART RATE: 95 BPM | BODY MASS INDEX: 30.92 KG/M2 | SYSTOLIC BLOOD PRESSURE: 125 MMHG | WEIGHT: 197 LBS

## 2018-10-16 DIAGNOSIS — Z79.899 MEDICATION MANAGEMENT: ICD-10-CM

## 2018-10-16 DIAGNOSIS — F33.1 MODERATE EPISODE OF RECURRENT MAJOR DEPRESSIVE DISORDER (HCC): Primary | ICD-10-CM

## 2018-10-16 DIAGNOSIS — F41.0 PANIC DISORDER WITHOUT AGORAPHOBIA: ICD-10-CM

## 2018-10-16 LAB
AMPHETAMINE CUT-OFF: 1000
BENZODIAZIPINE CUT-OFF: 300
BUPRENORPHINE CUT-OFF: 10
COCAINE CUT-OFF: 300
EXTERNAL AMPHETAMINE SCREEN URINE: NEGATIVE
EXTERNAL BENZODIAZEPINE SCREEN URINE: POSITIVE
EXTERNAL BUPRENORPHINE SCREEN URINE: NEGATIVE
EXTERNAL COCAINE SCREEN URINE: NEGATIVE
EXTERNAL MDMA: NEGATIVE
EXTERNAL METHADONE SCREEN URINE: NEGATIVE
EXTERNAL METHAMPHETAMINE SCREEN URINE: NEGATIVE
EXTERNAL OPIATES SCREEN URINE: NEGATIVE
EXTERNAL OXYCODONE SCREEN URINE: NEGATIVE
EXTERNAL THC SCREEN URINE: NEGATIVE
MDMA CUT-OFF: 500
METHADONE CUT-OFF: 300
METHAMPHETAMINE CUT-OFF: 1000
OPIATES CUT-OFF: 300
OXYCODONE CUT-OFF: 100
THC CUT-OFF: 50

## 2018-10-16 PROCEDURE — 90792 PSYCH DIAG EVAL W/MED SRVCS: CPT | Performed by: NURSE PRACTITIONER

## 2018-10-16 RX ORDER — VILAZODONE HYDROCHLORIDE 20 MG/1
20 TABLET ORAL DAILY
Qty: 30 TABLET | Refills: 0 | Status: SHIPPED | OUTPATIENT
Start: 2018-10-16 | End: 2018-10-23

## 2018-10-16 NOTE — PROGRESS NOTES
"Subjective   Jaquan Mckay is a 47 y.o. male who is here today for initial appointment to evaluate for medication options.     Chief Complaint:  MDD, Panic Disorder     HPI:  History of Present Illness   Patient reports he went through a divorce of an 8 month marriage seven months ago and began having issues with worsening depression and anxiety. Patient reports he has a history of seizures. He reports he fell and hit his head on the corner of a table at age 15 and had brain surgery. He reports they found a tumor and removed it. Patient reports panic attacks two to three times a week unsure of how long they last. He reports racing heart, shortness of breath and shakiness. He reports poor sleep. He reports he sleeps a couple of hours at night. He reports he can't stay asleep. He reports feeling fatigued, poor motivation and interest. He denies SI/HI/AH/VH. Appetite fair.  He has tried multiple medications for depression and anxiety and reports he had an allergic reaction or they weren't effective.     Past Psych History: Patient reports he was hospitalized at [facility] \"years ago\". Patient reports he sees a therapist Marya at [facility]. He reports he just started seeing her this year. He reports history of suicide attempts twice. He reports most recent was two years ago after his third wife . He reports he took a handful of pills with alcohol and got sick but was not hospitalized.     Previous Psych Meds: Klonopin, Paxil, Zoloft, Prozac, Effexor, Cymbalta.     Substance Abuse: He denies tobacco use, alcohol use, marijuana use, illicit drug use.     Social History: Patient has been  five times. He went through a divorce seven months ago. Patient is guarded when discussing marriages and divorces. He reports he lives alone in an apartment. He is currently unemployed and has a high school education. He has no [employer] preference and reports no interest in hobbies. He denies any trauma or abuse. He " "denies any legal issues.       Family Psychiatric History:  family history includes Diabetes in his brother, father, and mother; Heart attack in his father; Heart disease in his maternal aunt, maternal grandfather, maternal grandmother, maternal uncle, paternal aunt, paternal grandfather, paternal grandmother, and paternal uncle; Hypertension in his brother, father, and mother; Skin cancer in his father; Stroke in his mother.    Medical/Surgical History:  Past Medical History:   Diagnosis Date   • Allergic    • Anxiety    • Arthritis    • Asthma    • Body piercing     REPORTS CYLICONE IN EARS   • Clotting disorder (CMS/HCC) 2004    had a knee surgery   • Depression    • DVT (deep venous thrombosis) (CMS/HCC)     RIGHT RIGHT KNEE AFTER SURGERY YEARS AGO IN 2001 OR 2004   • Gastric ulcer    • GERD (gastroesophageal reflux disease)    • H/O migraine    • H/O sleep apnea     REPORTS DIAGNOSED WITH SLEEP APNEA AND COULDN'T STAND TO WEAR THE MACHINE   • Headache    • Heart attack (CMS/[facility])     REPORTS \"LIGHT HEART ATTACK A LONG TIME AGO\"  \"EARLY 90'S\"   • History of seizures     REPORTS LAST EPISODE WAS AROUND 1995.   • Hostility    • Hyperlipidemia    • Hypertension    • Knee pain, acute     Left   • Low back pain    • Migraine    • MRSA (methicillin resistant Staphylococcus aureus)     REPORTS LAST TESTED + 2004. WAS TREATED HE REPORTS.  RIGHT ARM, RIGHT KNEE.   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    • Obesity    • Poor historian    • [place] spotted fever    • Seizures (CMS/HCC)    • Tattoo    • Wears glasses      Past Surgical History:   Procedure Laterality Date   • BACK SURGERY     • BRAIN SURGERY  1986    Tumor removal    • CARDIAC CATHETERIZATION N/A 9/28/2018    Procedure: Left Heart Cath;  Surgeon: Leandro Daily MD;  Location: [facility] CATH INVASIVE LOCATION;  Service: Cardiology   • CARDIAC SURGERY      CARDIAC CATH REPORTED AS 2 MONTHS AGO IN [place]. REPORTS NO STENTS PLACED.   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    • Fish-Derived Products Hives   • Mobic [Meloxicam] Other (See Comments)     Pt states, \"It make my feet and hands go numb and I can't hardly walk.\"    • Robitussin Cough+ Chest Max St [Dextromethorphan-Guaifenesin] Unknown (See Comments)     Pt states, \"I don't remember its been so long.\"    • Zoloft [Sertraline Hcl] Hives and Itching   • Contrast Dye Itching and Rash   • Diltiazem Rash   • Gabapentin Rash   • Keflex [Cephalexin] Rash   • Metoprolol Rash   • Prednisone Rash and Other (See Comments)     Face, feet, and legs go completely numb per patient   • Shrimp (Diagnostic) Rash   • Spironolactone Rash   • Viibryd [Vilazodone Hcl] Itching and Rash           Current Medications:   Current Outpatient Prescriptions   Medication Sig Dispense Refill   • aspirin 81 MG EC tablet Take 1 tablet by mouth Daily 30 tablet 0   • cetirizine (zyrTEC) 10 MG tablet Take 10 mg by mouth Daily.     • clonazePAM (KlonoPIN) 0.5 MG tablet Take 1 tablet by mouth Daily As Needed for Anxiety. 10 tablet 0   • cyclobenzaprine (FLEXERIL) 10 MG tablet Take 1 tablet by mouth 2 (Two) Times a Day As Needed for Muscle Spasms. 60 tablet 5   • HYDROcodone-acetaminophen (NORCO)  MG per tablet Take 1 tablet by mouth 2 (Two) Times " a Day As Needed for Moderate Pain . 60 tablet 0   • lisinopril (PRINIVIL,ZESTRIL) 20 MG tablet Take 1 tablet by mouth Daily. for blood pressure. 30 tablet 5   • pantoprazole (PROTONIX) 40 MG EC tablet Take 1 tablet by mouth Daily. 30 tablet 5   • phenytoin (DILANTIN) 100 MG ER capsule Take 200 mg by mouth Every Morning. Prior to [facility] Admission, Patient was on: Uses brand name, takes 2 caps in the morning & 3 caps in the evening     • phenytoin (DILANTIN) 100 MG ER capsule Take 300 mg by mouth Every Evening. Prior to [facility] Admission, Patient was on: Prior to [facility] Admission, Patient was on: Uses brand name, takes 2 caps in the morning & 3 caps in the evening     • pravastatin (PRAVACHOL) 80 MG tablet Take 1 tablet by mouth every night at bedtime. 30 tablet 6   • desvenlafaxine (PRISTIQ) 50 MG 24 hr tablet Take 1 tablet by mouth Daily. 30 tablet 0   • docusate sodium 100 MG capsule Take 1 capsule by mouth 2 (Two) Times a Day. 60 capsule 0   • montelukast (SINGULAIR) 10 MG tablet Take 1 tablet by mouth Every Night. 30 tablet 5   • polyethylene glycol (MIRALAX) packet Take 17 g by mouth 2 (Two) Times a Day. 527 g 5     No current facility-administered medications for this visit.          Review of Systems   Constitutional: Positive for fatigue.   HENT: Negative.    Eyes: Negative.    Respiratory: Negative.    Cardiovascular: Negative.    Gastrointestinal: Negative.    Endocrine: Negative.    Genitourinary: Negative.    Musculoskeletal: Positive for arthralgias, back pain and myalgias.   Skin: Negative.    Psychiatric/Behavioral: Positive for decreased concentration, dysphoric mood and sleep disturbance. The patient is nervous/anxious.     denies HEENT, cardiovascular, respiratory, liver, renal, GI/, endocrine, neuro, DERM, hematology, immunology, musculoskeletal disorders.    Objective   Physical Exam   Constitutional: He appears well-developed and well-nourished. No distress.   Skin: He is not diaphoretic.  "  Vitals reviewed.    Blood pressure 125/75, pulse 95, height 170.2 cm (67\"), weight 89.4 kg (197 lb).    Mental Status Exam:   Hygiene:   good  Cooperation:  Guarded  Eye Contact:  Good  Psychomotor Behavior:  Restless  Affect:  Restricted  Hopelessness: 4  Speech:  Normal  Thought Process:  Goal directed and Linear  Thought Content:  Normal  Suicidal:  None  Homicidal:  None  Hallucinations:  None  Delusion:  None  Memory:  Intact  Orientation:  Person, Place, Time and Situation  Reliability:  fair  Insight:  Fair  Judgement:  Fair  Impulse Control:  Fair  Physical/Medical Issues:  Yes HTN, Pain Seizures       Short-term goals: Patient will be compliant with clinic appointments.  Patient will be engaged in therapy, medication compliant with minimal side effects. Patient  will report decrease of symptoms and frequency.    Long-term goals: Patient will have minimal symptoms of depression and anxiety with continued medication management. Patient will be compliant with treatment and appointments.       Problem list: Depression, Anxiety   Strengths: motivated to treatment   Weaknesses: poor coping skills     Assessment/Plan   Diagnoses and all orders for this visit:    Moderate episode of recurrent major depressive disorder (CMS/HCC)  -     Discontinue: vilazodone (VIIBRYD) 20 MG tablet tablet; Take 1 tablet by mouth Daily.    Panic disorder without agoraphobia  -     Discontinue: vilazodone (VIIBRYD) 20 MG tablet tablet; Take 1 tablet by mouth Daily.    Medication management  -     [facility] Drug Screen    Other orders  -     desvenlafaxine (PRISTIQ) 50 MG 24 hr tablet; Take 1 tablet by mouth Daily.      UDS - positive for benzodiazepines    Body mass index is 30.85 kg/m².  Patient was educated on healthier and more balanced diet choices and encouraged exercise within physical limitations.  Functionality: pt having significant impairment in important areas of daily functioning.  Prognosis: Guarded dependent on " medication/follow up and treatment plan compliance.    Impression: Patient having worsening of anxiety and depression since divorce.     Plan:  1. Start Viibryd 20 mg po daily for depression and anxiety.  2) RTC in 3 weeks     Discussed medication options.  Discussed the risks, benefits, and side effects of the medication; client acknowledged and verbally consented.  Patient is aware to contact the [facility] with any worsening of symptom.  Patient is agreeable to go to the ER or call 911 should they begin SI/HI.

## 2018-10-17 ENCOUNTER — READMISSION MANAGEMENT (OUTPATIENT)
Dept: CALL CENTER | Facility: HOSPITAL | Age: 48
End: 2018-10-17

## 2018-10-17 ENCOUNTER — TELEPHONE (OUTPATIENT)
Dept: PSYCHIATRY | Facility: CLINIC | Age: 48
End: 2018-10-17

## 2018-10-17 NOTE — OUTREACH NOTE
Medical Week 3 Survey      Responses   Facility patient discharged from?  [facility]   Does the patient have one of the following disease processes/diagnoses(primary or secondary)?  Other   Week 3 attempt successful?  No   Unsuccessful attempts  Attempt 1          Karyn Moralez RN

## 2018-10-19 ENCOUNTER — TELEPHONE (OUTPATIENT)
Dept: PSYCHIATRY | Facility: CLINIC | Age: 48
End: 2018-10-19

## 2018-10-19 NOTE — TELEPHONE ENCOUNTER
Patient called and said that he started Vibryd on 10/16 and he now has a rash on his face, back and legs and it is causing  Itching. He wants to know if he can stop the medication and start something else. Please advise. Adri will not be in until Monday.

## 2018-10-19 NOTE — TELEPHONE ENCOUNTER
Advised him to stop the Viibryd and check with BALDOMERO Pimentel Monday about possible alternative medications

## 2018-10-23 ENCOUNTER — PRIOR AUTHORIZATION (OUTPATIENT)
Dept: PSYCHIATRY | Facility: CLINIC | Age: 48
End: 2018-10-23

## 2018-10-23 ENCOUNTER — TELEPHONE (OUTPATIENT)
Dept: PSYCHIATRY | Facility: CLINIC | Age: 48
End: 2018-10-23

## 2018-10-23 RX ORDER — DESVENLAFAXINE SUCCINATE 50 MG/1
50 TABLET, EXTENDED RELEASE ORAL DAILY
Qty: 30 TABLET | Refills: 0 | Status: SHIPPED | OUTPATIENT
Start: 2018-10-23 | End: 2018-11-02 | Stop reason: SINTOL

## 2018-10-23 NOTE — TELEPHONE ENCOUNTER
----- Message from BALDOMERO Kahn sent at 10/23/2018 10:45 AM EDT -----  Regarding: Medication  Please let patient know I have send in a script for Pristiq to [facility]. Thank you.

## 2018-11-02 ENCOUNTER — OFFICE VISIT (OUTPATIENT)
Dept: FAMILY MEDICINE CLINIC | Facility: CLINIC | Age: 48
End: 2018-11-02

## 2018-11-02 VITALS
HEART RATE: 82 BPM | SYSTOLIC BLOOD PRESSURE: 140 MMHG | WEIGHT: 201 LBS | TEMPERATURE: 98.3 F | DIASTOLIC BLOOD PRESSURE: 82 MMHG | BODY MASS INDEX: 31.55 KG/M2 | OXYGEN SATURATION: 99 % | HEIGHT: 67 IN

## 2018-11-02 DIAGNOSIS — F51.02 INSOMNIA DUE TO PSYCHOLOGICAL STRESS: ICD-10-CM

## 2018-11-02 DIAGNOSIS — F41.1 GENERALIZED ANXIETY DISORDER: ICD-10-CM

## 2018-11-02 DIAGNOSIS — J45.20 MILD INTERMITTENT ASTHMA WITHOUT COMPLICATION: ICD-10-CM

## 2018-11-02 DIAGNOSIS — K59.03 DRUG-INDUCED CONSTIPATION: Primary | ICD-10-CM

## 2018-11-02 DIAGNOSIS — M54.42 CHRONIC BILATERAL LOW BACK PAIN WITH LEFT-SIDED SCIATICA: ICD-10-CM

## 2018-11-02 DIAGNOSIS — G89.29 CHRONIC BILATERAL LOW BACK PAIN WITH LEFT-SIDED SCIATICA: ICD-10-CM

## 2018-11-02 DIAGNOSIS — M94.262 CHONDROMALACIA OF LEFT KNEE: ICD-10-CM

## 2018-11-02 PROCEDURE — 99214 OFFICE O/P EST MOD 30 MIN: CPT | Performed by: NURSE PRACTITIONER

## 2018-11-02 RX ORDER — DOXEPIN HYDROCHLORIDE 10 MG/1
CAPSULE ORAL
Qty: 30 CAPSULE | Refills: 0 | Status: SHIPPED | OUTPATIENT
Start: 2018-11-02 | End: 2018-12-06 | Stop reason: SDUPTHER

## 2018-11-02 RX ORDER — CLONAZEPAM 0.5 MG/1
0.5 TABLET ORAL DAILY PRN
Qty: 10 TABLET | Refills: 0 | Status: SHIPPED | OUTPATIENT
Start: 2018-11-02 | End: 2018-12-07 | Stop reason: SDUPTHER

## 2018-11-02 RX ORDER — CETIRIZINE HYDROCHLORIDE 10 MG/1
10 TABLET ORAL DAILY
Qty: 30 TABLET | Refills: 5 | Status: SHIPPED | OUTPATIENT
Start: 2018-11-02 | End: 2019-09-25

## 2018-11-02 RX ORDER — HYDROCODONE BITARTRATE AND ACETAMINOPHEN 10; 325 MG/1; MG/1
1 TABLET ORAL 2 TIMES DAILY PRN
Qty: 60 TABLET | Refills: 0 | Status: SHIPPED | OUTPATIENT
Start: 2018-11-02 | End: 2018-12-07 | Stop reason: SDUPTHER

## 2018-11-02 RX ORDER — MONTELUKAST SODIUM 10 MG/1
10 TABLET ORAL NIGHTLY
Qty: 30 TABLET | Refills: 5 | Status: SHIPPED | OUTPATIENT
Start: 2018-11-02 | End: 2019-09-16 | Stop reason: SDUPTHER

## 2018-11-02 RX ORDER — BUDESONIDE AND FORMOTEROL FUMARATE DIHYDRATE 160; 4.5 UG/1; UG/1
2 AEROSOL RESPIRATORY (INHALATION) 2 TIMES DAILY
Qty: 1 INHALER | Refills: 12 | Status: SHIPPED | OUTPATIENT
Start: 2018-11-02 | End: 2019-09-16 | Stop reason: SDUPTHER

## 2018-11-02 NOTE — PROGRESS NOTES
"Subjective   Jaquan Mckay is a 47 y.o. male.     Chief Complaint   Patient presents with   • Knee Pain   • Wrist pain   • Anxiety       History of Present Illness     Knee pain on left-continues to have chronic knee pain despite surgery in Sept 2017.  He reports progressive numbness in his outer leg from lateral mid thigh to lateral mid calf then \"up under that bone\" on knee.  He feels the numbness is contributing to falls.    Vertigo-reports he feels constant dizziness since knee surgery.  He reports \"drunk feeling\".  He reports he would like to be checked for \"vertigo\".    Fall-last week, fell out of tub and hit his head and his right elbow.  He reports he did not have any LOC.  He reports ZAMUDIO has been present every since the fall.    Endocrine-did not get to go to his appt due to transportation problems.  He reports he did get resched for November 28.  This appt is for eval of elevated prolactin.  Ongoing.   Constipation-reports he continues to have constipation.  He has taken Senokot and Miralax but reports he is continuing to strain and have difficulty passing hard stool.     Depression-is under care of [facility].  He has been tried on Viibryd but had \"allergic reaction\".  He has been ordered Pristiq but reports \"CP and dizziness\".   He reports he took the med 3-4 days then had to stop.  He reports he is not sleeping and didn't go to bed until \"5 this morning\".  Reports he slept for an hour and then was back up.  He reports \"something needs to be done about my sleep\".  Ongoing.      The following portions of the patient's history were reviewed and updated as appropriate: allergies, current medications, past family history, past medical history, past social history, past surgical history and problem list.    Review of Systems   Constitutional: Positive for fatigue. Negative for appetite change and unexpected weight change.   HENT: Negative for congestion, ear pain, nosebleeds, postnasal drip, rhinorrhea, " "sinus pain, sinus pressure, sore throat, trouble swallowing and voice change.    Eyes: Negative for pain and visual disturbance.   Respiratory: Positive for cough. Negative for chest tightness, shortness of breath and wheezing.    Cardiovascular: Positive for chest pain and leg swelling. Negative for palpitations.   Gastrointestinal: Negative for abdominal pain, constipation, diarrhea and vomiting.   Endocrine: Negative for cold intolerance and polydipsia.   Genitourinary: Negative for difficulty urinating, dysuria, flank pain and hematuria.   Musculoskeletal: Positive for arthralgias, back pain and joint swelling. Negative for gait problem and myalgias.   Skin: Negative for color change and rash.   Allergic/Immunologic: Negative.    Neurological: Positive for dizziness, weakness, numbness and headaches. Negative for seizures and syncope.   Hematological: Bruises/bleeds easily.   Psychiatric/Behavioral: Positive for agitation, decreased concentration, dysphoric mood and sleep disturbance. Negative for suicidal ideas. The patient is nervous/anxious.    All other systems reviewed and are negative.      Objective     /82   Pulse 82   Temp 98.3 °F (36.8 °C) (Temporal Artery )   Ht 170.2 cm (67\")   Wt 91.2 kg (201 lb)   SpO2 99%   BMI 31.48 kg/m²     Physical Exam   Constitutional: He is oriented to person, place, and time. He appears well-developed and well-nourished.   HENT:   Head: Normocephalic and atraumatic.   Right Ear: External ear and ear canal normal. Tympanic membrane is not erythematous.   Left Ear: External ear and ear canal normal. Tympanic membrane is not erythematous.   Nose: Mucosal edema and rhinorrhea present.   Mouth/Throat: No oropharyngeal exudate or posterior oropharyngeal erythema.   Eyes: Pupils are equal, round, and reactive to light. Conjunctivae and EOM are normal. No scleral icterus.   Neck: Normal range of motion. Neck supple. No JVD present. No thyromegaly present. "   Cardiovascular: Normal rate, regular rhythm and normal heart sounds.    No murmur heard.  Pulmonary/Chest: Effort normal. No respiratory distress. He has no decreased breath sounds. He has no wheezes. He exhibits no tenderness.   Abdominal: Soft. Bowel sounds are normal. He exhibits no mass. There is no tenderness.   Musculoskeletal: He exhibits tenderness. He exhibits no edema.        Right elbow: Tenderness found. Medial epicondyle and olecranon process tenderness noted.        Left knee: He exhibits decreased range of motion and swelling (mild). He exhibits no ecchymosis. Tenderness found. Medial joint line and lateral joint line tenderness noted.        Lumbar back: He exhibits decreased range of motion, tenderness, pain and spasm. He exhibits no swelling.        Right hand: He exhibits tenderness (3rd digit). He exhibits normal capillary refill.     Skin Integrity  -  His right foot skin is intact.His left foot skin is intact..  Lymphadenopathy:        Head (right side): No submandibular adenopathy present.        Head (left side): No submandibular adenopathy present.     He has no cervical adenopathy.   Neurological: He is alert and oriented to person, place, and time. He has normal reflexes. No cranial nerve deficit. He exhibits normal muscle tone. Coordination normal.   Skin: Skin is warm and dry. Capillary refill takes less than 2 seconds.   Psychiatric: His speech is normal. Judgment and thought content normal. His mood appears anxious. He is not actively hallucinating. Cognition and memory are normal. He exhibits a depressed mood. He expresses no homicidal and no suicidal ideation. He exhibits normal recent memory and normal remote memory.   Flat affect He is attentive.   Vitals reviewed.      Assessment/Plan     Problem List Items Addressed This Visit        Nervous and Auditory    Chronic bilateral low back pain with left-sided sciatica    Relevant Medications    HYDROcodone-acetaminophen (NORCO)   MG per tablet       Musculoskeletal and Integument    Chondromalacia, knee    Relevant Medications    HYDROcodone-acetaminophen (NORCO)  MG per tablet       Other    Generalized anxiety disorder    Relevant Medications    clonazePAM (KlonoPIN) 0.5 MG tablet    doxepin (SINEquan) 10 MG capsule    Other Relevant Orders    Urine Drug Screen - Urine, Clean Catch      Other Visit Diagnoses     Drug-induced constipation    -  Primary    Relevant Medications    linaclotide (LINZESS) 145 MCG capsule capsule    Insomnia due to psychological stress        Mild intermittent asthma without complication        Relevant Medications    montelukast (SINGULAIR) 10 MG tablet    budesonide-formoterol (SYMBICORT) 160-4.5 MCG/ACT inhaler          Patient's Body mass index is 31.48 kg/m². BMI is above normal parameters. Recommendations include: nutrition counseling and pharmacological intervention.   (Normal BMI:  18.5-24.9, OW 25-29.9, Obesity 30 or greater)  I advised Jaquan of the risks of continuing to use tobacco, and I provided him with tobacco cessation educational materials in the After Visit Summary.     During this visit, I spent less than 3 minutes counseling the patient regarding tobacco cessation.    Understands disease processes and need for medications.  Understands reasons for urgent and emergent care.  Patient (& family) verbalized agreement for treatment plan.   Emotional support and active listening provided.  Patient provided time to verbalize feelings.  CHAVEZ reviewed today and consistent.  Will refill prescribed controlled medication today.  Patient is aware they cannot receive narcotics from any other provider except if under care of pain management or speciality clinic.  Risk and benefits of medication use has been reviewed.  History and physical exam exhibit continued safe and appropriate use of controlled substances.  UDS requested per [facility] while patient in office today.  Buccal swab or blood  sample will be obtained if patient is unable to provide specimen.   Trial of Doxepin for depression and insomnia.  Patient to report effectiveness.  He ask for RX to be sent to [facility] as [facility] will be closed this evening.    Keep upcoming appt with Ms Sullivan for psych care.  Refill on routine maintenance meds today.   RTC 1 month, sooner if needed.             This document has been electronically signed by:  BALDOMERO Thao, NESHAP-C

## 2018-11-02 NOTE — PATIENT INSTRUCTIONS
Steps to Quit Smoking  Smoking tobacco can be harmful to your health and can affect almost every organ in your body. Smoking puts you, and those around you, at risk for developing many serious chronic diseases. Quitting smoking is difficult, but it is one of the best things that you can do for your health. It is never too late to quit.  What are the benefits of quitting smoking?  When you quit smoking, you lower your risk of developing serious diseases and conditions, such as:  · Lung cancer or lung disease, such as COPD.  · Heart disease.  · Stroke.  · Heart attack.  · Infertility.  · Osteoporosis and bone fractures.    Additionally, symptoms such as coughing, wheezing, and shortness of breath may get better when you quit. You may also find that you get sick less often because your body is stronger at fighting off colds and infections. If you are pregnant, quitting smoking can help to reduce your chances of having a baby of low birth weight.  How do I get ready to quit?  When you decide to quit smoking, create a plan to make sure that you are successful. Before you quit:  · Pick a date to quit. Set a date within the next two weeks to give you time to prepare.  · Write down the reasons why you are quitting. Keep this list in places where you will see it often, such as on your bathroom mirror or in your car or wallet.  · Identify the people, places, things, and activities that make you want to smoke (triggers) and avoid them. Make sure to take these actions:  ? Throw away all cigarettes at home, at work, and in your car.  ? Throw away smoking accessories, such as ashtrays and lighters.  ? Clean your car and make sure to empty the ashtray.  ? Clean your home, including curtains and carpets.  · Tell your family, friends, and coworkers that you are quitting. Support from your loved ones can make quitting easier.  · Talk with your health care provider about your options for quitting smoking.  · Find out what treatment  options are covered by your health insurance.    What strategies can I use to quit smoking?  Talk with your healthcare provider about different strategies to quit smoking. Some strategies include:  · Quitting smoking altogether instead of gradually lessening how much you smoke over a period of time. Research shows that quitting “cold turkey” is more successful than gradually quitting.  · Attending in-person counseling to help you build problem-solving skills. You are more likely to have success in quitting if you attend several counseling sessions. Even short sessions of 10 minutes can be effective.  · Finding resources and support systems that can help you to quit smoking and remain smoke-free after you quit. These resources are most helpful when you use them often. They can include:  ? Online chats with a counselor.  ? Telephone quitlines.  ? Printed self-help materials.  ? Support groups or group counseling.  ? Text messaging programs.  ? Mobile phone applications.  · Taking medicines to help you quit smoking. (If you are pregnant or breastfeeding, talk with your health care provider first.) Some medicines contain nicotine and some do not. Both types of medicines help with cravings, but the medicines that include nicotine help to relieve withdrawal symptoms. Your health care provider may recommend:  ? Nicotine patches, gum, or lozenges.  ? Nicotine inhalers or sprays.  ? Non-nicotine medicine that is taken by mouth.    Talk with your health care provider about combining strategies, such as taking medicines while you are also receiving in-person counseling. Using these two strategies together makes you more likely to succeed in quitting than if you used either strategy on its own.  If you are pregnant or breastfeeding, talk with your health care provider about finding counseling or other support strategies to quit smoking. Do not take medicine to help you quit smoking unless told to do so by your health care  provider.  What things can I do to make it easier to quit?  Quitting smoking might feel overwhelming at first, but there is a lot that you can do to make it easier. Take these important actions:  · Reach out to your family and friends and ask that they support and encourage you during this time. Call telephone quitlines, reach out to support groups, or work with a counselor for support.  · Ask people who smoke to avoid smoking around you.  · Avoid places that trigger you to smoke, such as bars, parties, or smoke-break areas at work.  · Spend time around people who do not smoke.  · Lessen stress in your life, because stress can be a smoking trigger for some people. To lessen stress, try:  ? Exercising regularly.  ? Deep-breathing exercises.  ? Yoga.  ? Meditating.  ? Performing a body scan. This involves closing your eyes, scanning your body from head to toe, and noticing which parts of your body are particularly tense. Purposefully relax the muscles in those areas.  · Download or purchase mobile phone or tablet apps (applications) that can help you stick to your quit plan by providing reminders, tips, and encouragement. There are many free apps, such as QuitGuide from the CDC (Centers for Disease Control and Prevention). You can find other support for quitting smoking (smoking cessation) through smokefree.gov and other websites.    How will I feel when I quit smoking?  Within the first 24 hours of quitting smoking, you may start to feel some withdrawal symptoms. These symptoms are usually most noticeable 2-3 days after quitting, but they usually do not last beyond 2-3 weeks. Changes or symptoms that you might experience include:  · Mood swings.  · Restlessness, anxiety, or irritation.  · Difficulty concentrating.  · Dizziness.  · Strong cravings for sugary foods in addition to nicotine.  · Mild weight gain.  · Constipation.  · Nausea.  · Coughing or a sore throat.  · Changes in how your medicines work in your  body.  · A depressed mood.  · Difficulty sleeping (insomnia).    After the first 2-3 weeks of quitting, you may start to notice more positive results, such as:  · Improved sense of smell and taste.  · Decreased coughing and sore throat.  · Slower heart rate.  · Lower blood pressure.  · Clearer skin.  · The ability to breathe more easily.  · Fewer sick days.    Quitting smoking is very challenging for most people. Do not get discouraged if you are not successful the first time. Some people need to make many attempts to quit before they achieve long-term success. Do your best to stick to your quit plan, and talk with your health care provider if you have any questions or concerns.  This information is not intended to replace advice given to you by your health care provider. Make sure you discuss any questions you have with your health care provider.  Document Released: 12/12/2002 Document Revised: 08/15/2017 Document Reviewed: 05/03/2016  [employer] Interactive Patient Education © 2018 [employer]  Protein Content in Foods  Generally, most healthy people need around 50 grams of protein each day. Depending on your overall health, you may need more or less protein in your diet. Talk to your health care provider or dietitian about how much protein you need.  See the following list for the protein content of some common foods.  High-protein foods  High-protein foods contain 4 grams (4 g) or more of protein per serving. They include:  · Beef, ground sirloin (cooked) -- 3 oz have 24 g of protein.  · Cheese (hard) -- 1 oz has 7 g of protein.  · Chicken breast, boneless and skinless (cooked) -- 3 oz have 13.4 g of protein.  · Cottage cheese -- 1/2 cup has 13.4 g of protein.  · Egg -- 1 egg has 6 g of protein.  · Fish, filet (cooked) -- 1 oz has 6-7 g of protein.  · Garbanzo beans (canned or cooked) -- 1/2 cup has 6-7 g of protein.  · Kidney beans (canned or cooked) -- 1/2 cup has 6-7 g of protein.  · Lamb (cooked) -- 3 oz has  24 g of protein.  · Milk -- 1 cup (8 oz) has 8 g of protein.  · Nuts (peanuts, pistachios, almonds) -- 1 oz has 6 g of protein.  · Peanut butter -- 1 oz has 7-8 g of protein.  · Pork tenderloin (cooked) -- 3 oz has 18.4 g of protein.  · Pumpkin seeds -- 1 oz has 8.5 g of protein.  · Soybeans (roasted) -- 1 oz has 8 g of protein.  · Soybeans (cooked) -- 1/2 cup has 11 g of protein.  · Soy milk -- 1 cup (8 oz) has 5-10 g of protein.  · Soy or vegetable clara -- 1 clara has 11 g of protein.  · Sunflower seeds -- 1 oz has 5.5 g of protein.  · Tofu (firm) -- 1/2 cup has 20 g of protein.  · Tuna (canned in water) -- 3 oz has 20 g of protein.  · Yogurt -- 6 oz has 8 g of protein.    Low-protein foods  Low-protein foods contain 3 grams (3 g) or less of protein per serving. They include:  · Beets (raw or cooked) -- 1/2 cup has 1.5 g of protein.  · Bran cereal -- 1/2 cup has 2-3 g of protein.  · Bread -- 1 slice has 2.5 g of protein.  · Broccoli (raw or cooked) -- 1/2 cup has 2 g of protein.  · [place] greens (raw or cooked) -- 1/2 cup has 2 g of protein.  · Corn (fresh or cooked) -- 1/2 cup has 2 g of protein.  · Cream cheese -- 1 oz has 2 g of protein.  · Creamer (half-and-half) -- 1 oz has 1 g of protein.  · Flour tortilla -- 1 tortilla has 2.5 g of protein  · Frozen yogurt -- 1/2 cup has 3 g of protein.  · Fruit or vegetable juice -- 1/2 cup has 1 g of protein.  · Green beans (raw or cooked) -- 1/2 cup has 1 g of protein.  · Green peas (canned) -- 1/2 cup has 3.5 g of protein.  · Muffins -- 1 small muffin (2 oz) has 3 g of protein.  · Oatmeal (cooked) -- 1/2 cup has 3 g of protein.  · Potato (baked with skin) -- 1 medium potato has 3 g of protein.  · Rice (cooked) -- 1/2 cup has 2.5-3.5 g of protein.  · Sour cream -- 1/2 cup has 2.5 g of protein.  · Spinach (cooked) -- 1/2 cup has 3 g of protein.  · Squash (cooked) -- 1/2 cup has 1.5 g of protein.    Actual amounts of protein may be different depending on processing. Talk  with your health care provider or dietitian about what foods are recommended for you.  This information is not intended to replace advice given to you by your health care provider. Make sure you discuss any questions you have with your health care provider.  Document Released: 03/18/2017 Document Revised: 08/28/2017 Document Reviewed: 08/28/2017  [employer] Interactive Patient Education © 2018 [employer]

## 2018-11-06 ENCOUNTER — OFFICE VISIT (OUTPATIENT)
Dept: PSYCHIATRY | Facility: CLINIC | Age: 48
End: 2018-11-06

## 2018-11-06 VITALS
BODY MASS INDEX: 31.71 KG/M2 | HEIGHT: 67 IN | DIASTOLIC BLOOD PRESSURE: 75 MMHG | HEART RATE: 96 BPM | SYSTOLIC BLOOD PRESSURE: 127 MMHG | WEIGHT: 202 LBS

## 2018-11-06 DIAGNOSIS — F33.1 MODERATE EPISODE OF RECURRENT MAJOR DEPRESSIVE DISORDER (HCC): Primary | ICD-10-CM

## 2018-11-06 DIAGNOSIS — F51.05 INSOMNIA DUE TO OTHER MENTAL DISORDER: ICD-10-CM

## 2018-11-06 DIAGNOSIS — F41.0 PANIC DISORDER WITHOUT AGORAPHOBIA: ICD-10-CM

## 2018-11-06 DIAGNOSIS — F99 INSOMNIA DUE TO OTHER MENTAL DISORDER: ICD-10-CM

## 2018-11-06 PROCEDURE — 99214 OFFICE O/P EST MOD 30 MIN: CPT | Performed by: NURSE PRACTITIONER

## 2018-11-06 NOTE — PROGRESS NOTES
"Subjective   Jaquan Mckay is a 47 y.o. male who is here today for initial appointment to evaluate for medication options.     Chief Complaint:  f/u MDD and Panic     HPI:  History of Present Illness   Patient presents for follow-up today. Patient was unable to take Viibryd reports he developed a rash. He was also unable to take the Pristiq. He reports after four days of Pristiq he became dizzy and was having chest pain. He followed-up with his PCP who has started him on Doxepin which has been helpful for sleep and he is tolerating without side effects. He reports panic attacks a couple of times a week. He reports PCP increased his Klonopin and he reports he will  the increased dose today. He rates depression and anxiety 5/10 with 10 being the worst. Appetite good. He denies SI/HI/AH/VH. He reports he continues to see Marya at [facility] for therapy.     Family Psychiatric History:  family history includes Diabetes in his brother, father, and mother; Heart attack in his father; Heart disease in his maternal aunt, maternal grandfather, maternal grandmother, maternal uncle, paternal aunt, paternal grandfather, paternal grandmother, and paternal uncle; Hypertension in his brother, father, and mother; Skin cancer in his father; Stroke in his mother.    Medical/Surgical History:  Past Medical History:   Diagnosis Date   • Allergic    • Anxiety    • Arthritis    • Asthma    • Body piercing     REPORTS CYLICONE IN EARS   • Clotting disorder (CMS/[facility]) 2004    had a knee surgery   • Depression    • DVT (deep venous thrombosis) (CMS/[facility])     RIGHT RIGHT KNEE AFTER SURGERY YEARS AGO IN 2001 OR 2004   • Gastric ulcer    • GERD (gastroesophageal reflux disease)    • H/O migraine    • H/O sleep apnea     REPORTS DIAGNOSED WITH SLEEP APNEA AND COULDN'T STAND TO WEAR THE MACHINE   • Headache    • Heart attack (CMS/[facility])     REPORTS \"LIGHT HEART ATTACK A LONG TIME AGO\"  \"EARLY 90'S\"   • History of seizures     REPORTS LAST " "EPISODE WAS AROUND 1995.   • Hostility    • Hyperlipidemia    • Hypertension    • Knee pain, acute     Left   • Low back pain    • Migraine    • MRSA (methicillin resistant Staphylococcus aureus)     REPORTS LAST TESTED + 2004. WAS TREATED HE REPORTS.  RIGHT ARM, RIGHT KNEE.   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 REPORTS NO STENTS PLACED.   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    • Fish-Derived Products Hives   • Mobic [Meloxicam] Other (See Comments)     Pt states, \"It make my feet and hands go numb and I can't hardly walk.\"    • Pristiq [Desvenlafaxine Succinate Er] Dizziness   • Robitussin Cough+ Chest Max St [Dextromethorphan-Guaifenesin] Unknown (See Comments)     Pt states, \"I don't remember its been so long.\"    • Zoloft [Sertraline Hcl] Hives " and Itching   • Contrast Dye Itching and Rash   • Diltiazem Rash   • Gabapentin Rash   • Keflex [Cephalexin] Rash   • Metoprolol Rash   • Prednisone Rash and Other (See Comments)     Face, feet, and legs go completely numb per patient   • Shrimp (Diagnostic) Rash   • Spironolactone Rash   • Viibryd [Vilazodone Hcl] Itching and Rash           Current Medications:   Current Outpatient Prescriptions   Medication Sig Dispense Refill   • budesonide-formoterol (SYMBICORT) 160-4.5 MCG/ACT inhaler Inhale 2 puffs 2 (Two) Times a Day. 1 inhaler 12   • cetirizine (zyrTEC) 10 MG tablet Take 1 tablet by mouth Daily. 30 tablet 5   • clonazePAM (KlonoPIN) 0.5 MG tablet Take 1 tablet by mouth Daily As Needed for Anxiety. 10 tablet 0   • cyclobenzaprine (FLEXERIL) 10 MG tablet Take 1 tablet by mouth 2 (Two) Times a Day As Needed for Muscle Spasms. 60 tablet 5   • doxepin (SINEquan) 10 MG capsule 1 capsule 2-3 hours before bedtime for insomnia 30 capsule 0   • HYDROcodone-acetaminophen (NORCO)  MG per tablet Take 1 tablet by mouth 2 (Two) Times a Day As Needed for Moderate Pain . 60 tablet 0   • linaclotide (LINZESS) 145 MCG capsule capsule 1 every morning before breakfast 30 capsule 0   • lisinopril (PRINIVIL,ZESTRIL) 20 MG tablet Take 1 tablet by mouth Daily. for blood pressure. 30 tablet 5   • montelukast (SINGULAIR) 10 MG tablet Take 1 tablet by mouth Every Night. 30 tablet 5   • pantoprazole (PROTONIX) 40 MG EC tablet Take 1 tablet by mouth Daily. 30 tablet 5   • phenytoin (DILANTIN) 100 MG ER capsule Take 200 mg by mouth Every Morning. Prior to [facility] Admission, Patient was on: Uses brand name, takes 2 caps in the morning & 3 caps in the evening     • phenytoin (DILANTIN) 100 MG ER capsule Take 300 mg by mouth Every Evening. Prior to [facility] Admission, Patient was on: Prior to [facility] Admission, Patient was on: Uses brand name, takes 2 caps in the morning & 3 caps in the evening     • pravastatin (PRAVACHOL) 80 MG tablet  "Take 1 tablet by mouth every night at bedtime. 30 tablet 6     No current facility-administered medications for this visit.          Review of Systems   Constitutional: Positive for fatigue.   HENT: Negative.    Eyes: Negative.    Respiratory: Negative.    Cardiovascular: Negative.    Gastrointestinal: Negative.    Endocrine: Negative.    Genitourinary: Negative.    Musculoskeletal: Positive for arthralgias, back pain and myalgias.   Skin: Negative.    Allergic/Immunologic: Negative.    Neurological: Negative.    Hematological: Negative.    Psychiatric/Behavioral: Positive for decreased concentration and dysphoric mood. The patient is nervous/anxious.     denies HEENT, cardiovascular, respiratory, liver, renal, GI/, endocrine, neuro, DERM, hematology, immunology, musculoskeletal disorders.    Objective   Physical Exam   Constitutional: He appears well-developed and well-nourished. No distress.   Skin: He is not diaphoretic.   Vitals reviewed.    Blood pressure 127/75, pulse 96, height 170.2 cm (67.01\"), weight 91.6 kg (202 lb).    Mental Status Exam:   Hygiene:   good  Cooperation:  Guarded  Eye Contact:  Good  Psychomotor Behavior:  Restless  Affect:  Restricted  Hopelessness: 4  Speech:  Normal  Thought Process:  Goal directed and Linear  Thought Content:  Normal  Suicidal:  None  Homicidal:  None  Hallucinations:  None  Delusion:  None  Memory:  Intact  Orientation:  Person, Place, Time and Situation  Reliability:  fair  Insight:  Fair  Judgement:  Fair  Impulse Control:  Fair  Physical/Medical Issues:  Yes HTN, Pain Seizures       Assessment/Plan   Diagnoses and all orders for this visit:    Moderate episode of recurrent major depressive disorder (CMS/HCC)    Panic disorder without agoraphobia    Insomnia due to other mental disorder          Body mass index is 31.63 kg/m².  Patient was educated on healthier and more balanced diet choices and encouraged exercise within physical limitations.  Functionality: pt " having significant impairment in important areas of daily functioning.  Prognosis: Guarded dependent on medication/follow up and treatment plan compliance.    Impression: Patient having worsening of anxiety and depression since divorce and has had adverse reactions to multiple antidepressants tried with most recent ones being Pristiq and Viibryd.      Plan:  1. Will complete [facility] this visit due to adverse reactions and side effects to multiple antidepressants patient has tried.  2) May continue Doxepin 10 mg 1-3 po QHS prn sleep disturbance.   3) RTC in 3 weeks     Discussed medication options.  Discussed the risks, benefits, and side effects of the medication; client acknowledged and verbally consented.  Patient is aware to contact the [facility] with any worsening of symptom.  Patient is agreeable to go to the ER or call 911 should they begin SI/HI.

## 2018-11-26 DIAGNOSIS — M54.42 CHRONIC BILATERAL LOW BACK PAIN WITH LEFT-SIDED SCIATICA: ICD-10-CM

## 2018-11-26 DIAGNOSIS — G89.29 CHRONIC BILATERAL LOW BACK PAIN WITH LEFT-SIDED SCIATICA: ICD-10-CM

## 2018-11-26 DIAGNOSIS — M94.262 CHONDROMALACIA OF LEFT KNEE: ICD-10-CM

## 2018-11-26 DIAGNOSIS — F41.1 GENERALIZED ANXIETY DISORDER: ICD-10-CM

## 2018-11-26 DIAGNOSIS — I10 ESSENTIAL HYPERTENSION: ICD-10-CM

## 2018-11-26 DIAGNOSIS — K21.9 GASTROESOPHAGEAL REFLUX DISEASE WITHOUT ESOPHAGITIS: ICD-10-CM

## 2018-11-26 RX ORDER — LISINOPRIL 20 MG/1
TABLET ORAL
Qty: 30 TABLET | Refills: 5 | Status: SHIPPED | OUTPATIENT
Start: 2018-11-26 | End: 2018-12-07 | Stop reason: SDUPTHER

## 2018-11-26 RX ORDER — HYDROCODONE BITARTRATE AND ACETAMINOPHEN 10; 325 MG/1; MG/1
TABLET ORAL
Qty: 60 TABLET | Refills: 0 | OUTPATIENT
Start: 2018-11-26

## 2018-11-26 RX ORDER — PANTOPRAZOLE SODIUM 40 MG/1
TABLET, DELAYED RELEASE ORAL
Qty: 30 TABLET | Refills: 5 | Status: SHIPPED | OUTPATIENT
Start: 2018-11-26 | End: 2019-05-22 | Stop reason: SDUPTHER

## 2018-11-26 RX ORDER — CLONAZEPAM 0.5 MG/1
TABLET ORAL
Qty: 10 TABLET | Refills: 0 | OUTPATIENT
Start: 2018-11-26

## 2018-12-06 ENCOUNTER — OFFICE VISIT (OUTPATIENT)
Dept: PSYCHIATRY | Facility: CLINIC | Age: 48
End: 2018-12-06

## 2018-12-06 VITALS
WEIGHT: 211.6 LBS | DIASTOLIC BLOOD PRESSURE: 72 MMHG | BODY MASS INDEX: 33.21 KG/M2 | SYSTOLIC BLOOD PRESSURE: 122 MMHG | HEIGHT: 67 IN

## 2018-12-06 DIAGNOSIS — F51.05 INSOMNIA DUE TO OTHER MENTAL DISORDER: ICD-10-CM

## 2018-12-06 DIAGNOSIS — F99 INSOMNIA DUE TO OTHER MENTAL DISORDER: ICD-10-CM

## 2018-12-06 DIAGNOSIS — F41.0 PANIC DISORDER WITHOUT AGORAPHOBIA: ICD-10-CM

## 2018-12-06 DIAGNOSIS — F33.1 MODERATE EPISODE OF RECURRENT MAJOR DEPRESSIVE DISORDER (HCC): Primary | ICD-10-CM

## 2018-12-06 PROCEDURE — 99214 OFFICE O/P EST MOD 30 MIN: CPT | Performed by: NURSE PRACTITIONER

## 2018-12-06 RX ORDER — DOXEPIN HYDROCHLORIDE 10 MG/1
CAPSULE ORAL
Qty: 30 CAPSULE | Refills: 0 | Status: SHIPPED | OUTPATIENT
Start: 2018-12-06 | End: 2019-03-12 | Stop reason: SDUPTHER

## 2018-12-06 RX ORDER — LAMOTRIGINE 25 MG/1
25 TABLET ORAL DAILY
Qty: 45 TABLET | Refills: 0 | Status: SHIPPED | OUTPATIENT
Start: 2018-12-06 | End: 2019-01-28

## 2018-12-06 NOTE — PROGRESS NOTES
"Subjective   Jaquan Mckay is a 48 y.o. male who is here today for initial appointment to evaluate for medication options.     Chief Complaint:  f/u MDD and Panic     HPI:  History of Present Illness   Patient presents for follow-up today. Reviewed genesight testing with patient. He is agreeable to try Lamictal for mood.  He continues to get therapy at [facility]. He reports sleep is fair with Doxepin. He rates depression and anxiety 6/10 with 10 being the worst. He reports he had a couple of panic attacks this week. He denies suicidal and homicidal ideations. He denies auditory and visual hallucinations. Appetite good. Patient compliant with Doxepin and tolerating without side effects.     Family Psychiatric History:  family history includes Diabetes in his brother, father, and mother; Heart attack in his father; Heart disease in his maternal aunt, maternal grandfather, maternal grandmother, maternal uncle, paternal aunt, paternal grandfather, paternal grandmother, and paternal uncle; Hypertension in his brother, father, and mother; Skin cancer in his father; Stroke in his mother.    Medical/Surgical History:  Past Medical History:   Diagnosis Date   • Allergic    • Anxiety    • Arthritis    • Asthma    • Body piercing     REPORTS CYLICONE IN EARS   • Clotting disorder (CMS/[facility]) 2004    had a knee surgery   • Depression    • DVT (deep venous thrombosis) (CMS/[facility])     RIGHT RIGHT KNEE AFTER SURGERY YEARS AGO IN 2001 OR 2004   • Gastric ulcer    • GERD (gastroesophageal reflux disease)    • H/O migraine    • H/O sleep apnea     REPORTS DIAGNOSED WITH SLEEP APNEA AND COULDN'T STAND TO WEAR THE MACHINE   • Headache    • Heart attack (CMS/[facility])     REPORTS \"LIGHT HEART ATTACK A LONG TIME AGO\"  \"EARLY 90'S\"   • History of seizures     REPORTS LAST EPISODE WAS AROUND 1995.   • Hostility    • Hyperlipidemia    • Hypertension    • Knee pain, acute     Left   • Low back pain    • Migraine    • MRSA (methicillin resistant " "Staphylococcus aureus)     REPORTS LAST TESTED + 2004. WAS TREATED HE REPORTS.  RIGHT ARM, RIGHT KNEE.   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    • Obesity    • Poor historian    • Carl Mountain spotted fever    • Seizures (CMS/HCC)    • Tattoo    • Wears glasses      Past Surgical History:   Procedure Laterality Date   • BACK SURGERY     • BRAIN SURGERY  1986    Tumor removal    • CARDIAC SURGERY      CARDIAC CATH REPORTED AS 2 MONTHS AGO IN [place]. REPORTS NO STENTS PLACED.   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    • Fish-Derived Products Hives   • Mobic [Meloxicam] Other (See Comments)     Pt states, \"It make my feet and hands go numb and I can't hardly walk.\"    • Pristiq [Desvenlafaxine Succinate Er] Dizziness   • Robitussin Cough+ Chest Max St [Dextromethorphan-Guaifenesin] Unknown (See Comments)     Pt states, \"I don't remember its been so long.\"    • Zoloft [Sertraline Hcl] Hives and Itching   • Contrast Dye Itching and Rash   • Diltiazem Rash   • Gabapentin Rash   • Keflex [Cephalexin] Rash   • Metoprolol Rash   • Prednisone Rash and Other (See Comments)     Face, feet, and legs go completely numb per patient   • Shrimp (Diagnostic) Rash   • Spironolactone Rash   • Viibryd [Vilazodone Hcl] Itching and Rash           Current Medications:   Current Outpatient Medications   Medication Sig Dispense Refill   • cetirizine (zyrTEC) 10 MG tablet Take 1 tablet by mouth Daily. 30 " tablet 5   • clonazePAM (KlonoPIN) 0.5 MG tablet Take 1 tablet by mouth Daily As Needed for Anxiety. 10 tablet 0   • cyclobenzaprine (FLEXERIL) 10 MG tablet Take 1 tablet by mouth 2 (Two) Times a Day As Needed for Muscle Spasms. 60 tablet 5   • doxepin (SINEquan) 10 MG capsule 1 capsule 2-3 hours before bedtime for insomnia 30 capsule 0   • HYDROcodone-acetaminophen (NORCO)  MG per tablet Take 1 tablet by mouth 2 (Two) Times a Day As Needed for Moderate Pain . 60 tablet 0   • lisinopril (PRINIVIL,ZESTRIL) 20 MG tablet TAKE ONE TABLET BY MOUTH DAILY FOR BLOOD PRESSURE 30 tablet 5   • montelukast (SINGULAIR) 10 MG tablet Take 1 tablet by mouth Every Night. 30 tablet 5   • pantoprazole (PROTONIX) 40 MG EC tablet TAKE ONE TABLET BY MOUTH DAILY FOR THE STOMACH 30 tablet 5   • phenytoin (DILANTIN) 100 MG ER capsule Take 200 mg by mouth Every Morning. Prior to [facility] Admission, Patient was on: Uses brand name, takes 2 caps in the morning & 3 caps in the evening     • phenytoin (DILANTIN) 100 MG ER capsule Take 300 mg by mouth Every Evening. Prior to [facility] Admission, Patient was on: Prior to [facility] Admission, Patient was on: Uses brand name, takes 2 caps in the morning & 3 caps in the evening     • pravastatin (PRAVACHOL) 80 MG tablet Take 1 tablet by mouth every night at bedtime. 30 tablet 6   • budesonide-formoterol (SYMBICORT) 160-4.5 MCG/ACT inhaler Inhale 2 puffs 2 (Two) Times a Day. 1 inhaler 12   • lamoTRIgine (LaMICtal) 25 MG tablet Take 1 tablet by mouth Daily. 45 tablet 0   • linaclotide (LINZESS) 145 MCG capsule capsule 1 every morning before breakfast 30 capsule 0     No current facility-administered medications for this visit.          Review of Systems   Constitutional: Positive for fatigue.   HENT: Negative.    Eyes: Negative.    Respiratory: Negative.    Cardiovascular: Negative.    Gastrointestinal: Negative.    Endocrine: Negative.    Genitourinary: Negative.    Musculoskeletal: Positive for  "arthralgias, back pain and myalgias.   Skin: Negative.    Allergic/Immunologic: Negative.    Neurological: Negative.    Hematological: Negative.    Psychiatric/Behavioral: Positive for decreased concentration and dysphoric mood. The patient is nervous/anxious.     denies HEENT, cardiovascular, respiratory, liver, renal, GI/, endocrine, neuro, DERM, hematology, immunology, musculoskeletal disorders.    Objective   Physical Exam   Constitutional: He appears well-developed and well-nourished. No distress.   Skin: He is not diaphoretic.   Vitals reviewed.    Blood pressure 122/72, height 170.2 cm (67\"), weight 96 kg (211 lb 9.6 oz).    Mental Status Exam:   Hygiene:   good  Cooperation:  Guarded  Eye Contact:  Good  Psychomotor Behavior:  Restless  Affect:  Restricted  Hopelessness: 4  Speech:  Normal  Thought Process:  Goal directed and Linear  Thought Content:  Normal  Suicidal:  None  Homicidal:  None  Hallucinations:  None  Delusion:  None  Memory:  Intact  Orientation:  Person, Place, Time and Situation  Reliability:  fair  Insight:  Fair  Judgement:  Fair  Impulse Control:  Fair  Physical/Medical Issues:  Yes HTN, Pain Seizures       Assessment/Plan   Diagnoses and all orders for this visit:    Moderate episode of recurrent major depressive disorder (CMS/HCC)  -     lamoTRIgine (LaMICtal) 25 MG tablet; Take 1 tablet by mouth Daily.    Panic disorder without agoraphobia    Insomnia due to other mental disorder  -     doxepin (SINEquan) 10 MG capsule; 1 capsule 2-3 hours before bedtime for insomnia          Body mass index is 33.14 kg/m².  Patient was educated on healthier and more balanced diet choices and encouraged exercise within physical limitations.  Functionality: pt having significant impairment in important areas of daily functioning.  Prognosis: Guarded dependent on medication/follow up and treatment plan compliance.    Impression: Patient having worsening of anxiety and depression.     Plan:  1. " Reviewed [employer] testing with patient. He does not want to try any further medications for depression and anxiety but will continue with psychotherapy through [facility].  2) May continue Doxepin 10 mg 1-3 po QHS prn sleep disturbance.   3) Trial Lamictal 25 mg po daily for 14 days then increase to two tablets daily. Patient instructed to stop medication and notify prescriber should he develop a rash.   4) RTC 4 weeks     Discussed medication options.  Discussed the risks, benefits, and side effects of the medication; client acknowledged and verbally consented.  Patient is aware to contact the [facility] with any worsening of symptom.  Patient is agreeable to go to the ER or call 911 should they begin SI/HI.

## 2018-12-07 ENCOUNTER — OFFICE VISIT (OUTPATIENT)
Dept: FAMILY MEDICINE CLINIC | Facility: CLINIC | Age: 48
End: 2018-12-07

## 2018-12-07 VITALS
OXYGEN SATURATION: 97 % | HEART RATE: 83 BPM | WEIGHT: 206 LBS | BODY MASS INDEX: 32.33 KG/M2 | HEIGHT: 67 IN | TEMPERATURE: 98.2 F | DIASTOLIC BLOOD PRESSURE: 72 MMHG | SYSTOLIC BLOOD PRESSURE: 120 MMHG

## 2018-12-07 DIAGNOSIS — G89.29 CHRONIC BILATERAL LOW BACK PAIN WITH LEFT-SIDED SCIATICA: ICD-10-CM

## 2018-12-07 DIAGNOSIS — E78.00 PURE HYPERCHOLESTEROLEMIA: ICD-10-CM

## 2018-12-07 DIAGNOSIS — J06.9 ACUTE URI: Primary | ICD-10-CM

## 2018-12-07 DIAGNOSIS — E87.6 HYPOKALEMIA: ICD-10-CM

## 2018-12-07 DIAGNOSIS — F41.1 GENERALIZED ANXIETY DISORDER: ICD-10-CM

## 2018-12-07 DIAGNOSIS — M62.838 MUSCLE SPASM OF BOTH LOWER LEGS: ICD-10-CM

## 2018-12-07 DIAGNOSIS — M94.262 CHONDROMALACIA OF LEFT KNEE: ICD-10-CM

## 2018-12-07 DIAGNOSIS — F41.0 PANIC DISORDER WITHOUT AGORAPHOBIA: ICD-10-CM

## 2018-12-07 DIAGNOSIS — I10 ESSENTIAL HYPERTENSION: ICD-10-CM

## 2018-12-07 DIAGNOSIS — J45.40 MODERATE PERSISTENT ASTHMA WITHOUT COMPLICATION: ICD-10-CM

## 2018-12-07 DIAGNOSIS — M54.42 CHRONIC BILATERAL LOW BACK PAIN WITH LEFT-SIDED SCIATICA: ICD-10-CM

## 2018-12-07 DIAGNOSIS — F33.1 MODERATE EPISODE OF RECURRENT MAJOR DEPRESSIVE DISORDER (HCC): ICD-10-CM

## 2018-12-07 PROCEDURE — 99214 OFFICE O/P EST MOD 30 MIN: CPT | Performed by: NURSE PRACTITIONER

## 2018-12-07 RX ORDER — AMLODIPINE BESYLATE 5 MG/1
5 TABLET ORAL DAILY
Qty: 30 TABLET | Refills: 3 | OUTPATIENT
Start: 2018-12-07 | End: 2019-01-06

## 2018-12-07 RX ORDER — ASPIRIN 81 MG/1
TABLET ORAL
Qty: 30 TABLET | Refills: 0 | Status: SHIPPED | OUTPATIENT
Start: 2018-12-07 | End: 2019-02-12 | Stop reason: SDUPTHER

## 2018-12-07 RX ORDER — HYDROCODONE BITARTRATE AND ACETAMINOPHEN 10; 325 MG/1; MG/1
1 TABLET ORAL 2 TIMES DAILY PRN
Qty: 60 TABLET | Refills: 0 | Status: SHIPPED | OUTPATIENT
Start: 2018-12-07 | End: 2019-01-07 | Stop reason: SDUPTHER

## 2018-12-07 RX ORDER — CLONAZEPAM 0.5 MG/1
0.5 TABLET ORAL DAILY PRN
Qty: 10 TABLET | Refills: 0 | Status: SHIPPED | OUTPATIENT
Start: 2018-12-07 | End: 2019-02-06

## 2018-12-07 RX ORDER — MECLIZINE HCL 12.5 MG/1
12.5 TABLET ORAL 3 TIMES DAILY PRN
Qty: 30 TABLET | Refills: 0 | Status: SHIPPED | OUTPATIENT
Start: 2018-12-07 | End: 2019-09-16 | Stop reason: SDUPTHER

## 2018-12-07 RX ORDER — DOXYCYCLINE 100 MG/1
100 CAPSULE ORAL EVERY 12 HOURS SCHEDULED
Qty: 20 CAPSULE | Refills: 0 | Status: SHIPPED | OUTPATIENT
Start: 2018-12-07 | End: 2019-02-06

## 2018-12-07 NOTE — PROGRESS NOTES
"Subjective   Jaquan Mckay is a 48 y.o. male.     Chief Complaint   Patient presents with   • Anxiety   • Dizziness   • Knee Pain       History of Present Illness     Dizziness-acute today.  Reports left ear pain and sensation that it is stopped up.  He reports some non productive cough.  No CP or SOA with symptoms.   He reports sensation that the room is spinning.  He reports closing his eyes and applying pressure to his nose helps the symptom.  Not at goal.    Depression-under care of BALDOMERO at [facility]. He reports he was started on a new medicine that he will  today.  He will take daily as directed and then increase to BID after 14 days.  He will follow up in \"4 weeks\".   Depression symptoms are not worse.  Some anxiety regarding the holidays and family situations.    Constipation-reports trial of Linzess 145 mcg was effective.  He reports his bowels are moving better and he is having less bloating and abdominal hardness.  He would like to continue the medication.  Ongoing.    Fall-reports he was walking carrying groceries and hurt his wrist and thumb on the left.  He was seen at urgent care but did not have fractures.  He reports \"strained\".  He has been using ACE support.  He reports he continues to have soreness.  He reports his knee gave out.  Ongoing.    Sores on arm-on right arm.  He has no known exposures.  Burning and he is afraid they will become infected.  He has a history of MRSA.  He has not noted any other areas of open skin.  Not at goal.   Knee pain on left-continues to have chronic knee pain despite surgery in Sept 2017.  He reports progressive numbness in his outer leg from lateral mid thigh to lateral mid calf then \"up under that bone\" on knee.  He feels the numbness is contributing to falls.      The following portions of the patient's history were reviewed and updated as appropriate: allergies, current medications, past family history, past medical history, past social history, past " "surgical history and problem list.    Review of Systems   Constitutional: Positive for fatigue. Negative for appetite change and unexpected weight change.   HENT: Negative for congestion, ear pain, nosebleeds, postnasal drip, rhinorrhea, sinus pressure, sinus pain, sore throat, trouble swallowing and voice change.    Eyes: Negative for pain and visual disturbance.   Respiratory: Positive for cough. Negative for chest tightness, shortness of breath and wheezing.    Cardiovascular: Positive for chest pain and leg swelling. Negative for palpitations.   Gastrointestinal: Negative for abdominal pain, constipation, diarrhea and vomiting.   Endocrine: Negative for cold intolerance and polydipsia.   Genitourinary: Negative for difficulty urinating, dysuria, flank pain and hematuria.   Musculoskeletal: Positive for arthralgias, back pain and joint swelling. Negative for gait problem and myalgias.   Skin: Negative for color change and rash.   Allergic/Immunologic: Negative.    Neurological: Positive for dizziness, weakness, numbness and headaches. Negative for seizures and syncope.   Hematological: Bruises/bleeds easily.   Psychiatric/Behavioral: Positive for agitation, decreased concentration, dysphoric mood and sleep disturbance. Negative for suicidal ideas. The patient is nervous/anxious.    All other systems reviewed and are negative.      Objective     /72   Pulse 83   Temp 98.2 °F (36.8 °C) (Temporal)   Ht 170.2 cm (67\")   Wt 93.4 kg (206 lb)   SpO2 97%   BMI 32.26 kg/m²     Physical Exam   Constitutional: He is oriented to person, place, and time. He appears well-developed and well-nourished.   HENT:   Head: Normocephalic and atraumatic.   Right Ear: External ear and ear canal normal. Tympanic membrane is erythematous. A middle ear effusion is present.   Left Ear: External ear and ear canal normal. Tympanic membrane is erythematous. A middle ear effusion is present.   Nose: Mucosal edema and rhinorrhea " present. Right sinus exhibits maxillary sinus tenderness and frontal sinus tenderness. Left sinus exhibits maxillary sinus tenderness and frontal sinus tenderness.   Mouth/Throat: Oropharyngeal exudate and posterior oropharyngeal erythema present.   edentulous     Eyes: Conjunctivae and EOM are normal. Pupils are equal, round, and reactive to light. No scleral icterus.   Neck: Normal range of motion. Neck supple. No JVD present. No thyromegaly present.   Cardiovascular: Normal rate, regular rhythm and normal heart sounds.   No murmur heard.  Pulmonary/Chest: Effort normal. No respiratory distress. He has no decreased breath sounds. He has no wheezes. He exhibits no tenderness.   Abdominal: Soft. Bowel sounds are normal. He exhibits no mass. There is no tenderness.   Musculoskeletal: He exhibits tenderness. He exhibits no edema.        Right elbow: He exhibits normal range of motion. Tenderness found. Medial epicondyle and olecranon process tenderness noted.        Left knee: He exhibits decreased range of motion, swelling (mild) and bony tenderness. He exhibits no effusion, no ecchymosis and no erythema. Tenderness found. Medial joint line and lateral joint line tenderness noted.        Lumbar back: He exhibits decreased range of motion, tenderness, pain and spasm. He exhibits no swelling.        Right hand: He exhibits tenderness (3rd digit). He exhibits normal two-point discrimination, normal capillary refill and no swelling.     Skin Integrity  -  His right foot skin is intact.His left foot skin is intact..  Lymphadenopathy:        Head (right side): No submandibular adenopathy present.        Head (left side): No submandibular adenopathy present.     He has cervical adenopathy.        Right cervical: Superficial cervical adenopathy present.        Left cervical: Superficial cervical adenopathy present.   Neurological: He is alert and oriented to person, place, and time. He has normal reflexes. No cranial nerve  deficit. He exhibits normal muscle tone. Coordination normal.   Skin: Skin is warm and dry. Capillary refill takes less than 2 seconds.   Psychiatric: His speech is normal. Judgment and thought content normal. His mood appears anxious. He is not actively hallucinating. Cognition and memory are normal. He exhibits a depressed mood. He expresses no homicidal and no suicidal ideation. He exhibits normal recent memory and normal remote memory.   Flat affect He is attentive.   Vitals reviewed.      Assessment/Plan     Problem List Items Addressed This Visit        Nervous and Auditory    Chronic bilateral low back pain with left-sided sciatica    Relevant Medications    HYDROcodone-acetaminophen (NORCO)  MG per tablet       Musculoskeletal and Integument    Chondromalacia, knee    Relevant Medications    HYDROcodone-acetaminophen (NORCO)  MG per tablet       Other    Generalized anxiety disorder    Relevant Medications    clonazePAM (KlonoPIN) 0.5 MG tablet      Other Visit Diagnoses     Acute URI    -  Primary    Relevant Medications    doxycycline (MONODOX) 100 MG capsule    meclizine (ANTIVERT) 12.5 MG tablet          Patient's Body mass index is 32.26 kg/m². BMI is above normal parameters. Recommendations include: nutrition counseling.   (Normal BMI:  18.5-24.9, OW 25-29.9, Obesity 30 or greater)  Encouraged tobacco cessation.    Understands disease processes and need for medications.  Understands reasons for urgent and emergent care.  Patient (& family) verbalized agreement for treatment plan.   Emotional support and active listening provided.  Patient provided time to verbalize feelings.    CHAVEZ reviewed today and consistent.  Will refill prescribed controlled medication today.  Patient is aware they cannot receive narcotics from any other provider except if under care of pain management or speciality clinic.  Risk and benefits of medication use has been reviewed.  History and physical exam exhibit  continued safe and appropriate use of controlled substances.  The patient is aware of the potential for addiction and dependence.  This patient has been made aware of the appropriate use of such medications, including potential risk of somnolence, limited ability to drive and / or work safely, and potential for overdose.    It has also been made clear that these medications are for use by this patient only, without concomitant use of alcohol or other substances unless prescribed/advised by medical provider.  Patient understands they may be subject to UDS and pill counts at random.      Continue under care of cardio  Continue under care of comp care.    RTC 1 month, sooner if needed.           This document has been electronically signed by:  BALDOMERO Thao, NESHAP-C

## 2018-12-07 NOTE — TELEPHONE ENCOUNTER
Called pt to advise him that I have made him an apt for 1/29/18 with Virginie so we can continue refilling his medicine. He expressed understanding. He also stated that he doesn't take the Amlodipine anymore due to him having a bad reactions to it. But he still takes the ASA 81 mg.

## 2018-12-08 ENCOUNTER — HOSPITAL ENCOUNTER (EMERGENCY)
Facility: HOSPITAL | Age: 48
Discharge: HOME | End: 2018-12-09
Attending: FAMILY MEDICINE | Admitting: FAMILY MEDICINE

## 2018-12-08 DIAGNOSIS — M79.605 PAIN OF LEFT LOWER EXTREMITY: Primary | ICD-10-CM

## 2018-12-08 DIAGNOSIS — S86.112A GASTROCNEMIUS MUSCLE STRAIN, LEFT, INITIAL ENCOUNTER: ICD-10-CM

## 2018-12-08 LAB
ALBUMIN SERPL-MCNC: 4.5 G/DL (ref 3.5–5)
ALBUMIN/GLOB SERPL: 1.3 G/DL (ref 1.5–2.5)
ALP SERPL-CCNC: 55 U/L (ref 40–129)
ALT SERPL W P-5'-P-CCNC: 21 U/L (ref 10–44)
ANION GAP SERPL CALCULATED.3IONS-SCNC: 6.7 MMOL/L (ref 3.6–11.2)
AST SERPL-CCNC: 54 U/L (ref 10–34)
BASOPHILS # BLD AUTO: 0.01 10*3/MM3 (ref 0–0.3)
BASOPHILS NFR BLD AUTO: 0.1 % (ref 0–2)
BILIRUB SERPL-MCNC: 0.3 MG/DL (ref 0.2–1.8)
BUN BLD-MCNC: 14 MG/DL (ref 7–21)
BUN/CREAT SERPL: 16.9 (ref 7–25)
CALCIUM SPEC-SCNC: 9.1 MG/DL (ref 7.7–10)
CHLORIDE SERPL-SCNC: 103 MMOL/L (ref 99–112)
CO2 SERPL-SCNC: 28.3 MMOL/L (ref 24.3–31.9)
CREAT BLD-MCNC: 0.83 MG/DL (ref 0.43–1.29)
CRP SERPL-MCNC: <0.5 MG/DL (ref 0–0.99)
DEPRECATED RDW RBC AUTO: 44.2 FL (ref 37–54)
EOSINOPHIL # BLD AUTO: 0.07 10*3/MM3 (ref 0–0.7)
EOSINOPHIL NFR BLD AUTO: 0.8 % (ref 0–5)
ERYTHROCYTE [DISTWIDTH] IN BLOOD BY AUTOMATED COUNT: 13.1 % (ref 11.5–14.5)
ERYTHROCYTE [SEDIMENTATION RATE] IN BLOOD: 8 MM/HR (ref 0–15)
GFR SERPL CREATININE-BSD FRML MDRD: 100 ML/MIN/1.73
GLOBULIN UR ELPH-MCNC: 3.4 GM/DL
GLUCOSE BLD-MCNC: 85 MG/DL (ref 70–110)
HCT VFR BLD AUTO: 43.4 % (ref 42–52)
HGB BLD-MCNC: 14.9 G/DL (ref 14–18)
IMM GRANULOCYTES # BLD: 0.01 10*3/MM3 (ref 0–0.03)
IMM GRANULOCYTES NFR BLD: 0.1 % (ref 0–0.5)
LYMPHOCYTES # BLD AUTO: 1.83 10*3/MM3 (ref 1–3)
LYMPHOCYTES NFR BLD AUTO: 22.1 % (ref 21–51)
MCH RBC QN AUTO: 32.9 PG (ref 27–33)
MCHC RBC AUTO-ENTMCNC: 34.3 G/DL (ref 33–37)
MCV RBC AUTO: 95.8 FL (ref 80–94)
MONOCYTES # BLD AUTO: 1.03 10*3/MM3 (ref 0.1–0.9)
MONOCYTES NFR BLD AUTO: 12.4 % (ref 0–10)
NEUTROPHILS # BLD AUTO: 5.33 10*3/MM3 (ref 1.4–6.5)
NEUTROPHILS NFR BLD AUTO: 64.5 % (ref 30–70)
OSMOLALITY SERPL CALC.SUM OF ELEC: 275.4 MOSM/KG (ref 273–305)
PLATELET # BLD AUTO: 196 10*3/MM3 (ref 130–400)
PMV BLD AUTO: 11 FL (ref 6–10)
POTASSIUM BLD-SCNC: 5.8 MMOL/L (ref 3.5–5.3)
PROT SERPL-MCNC: 7.9 G/DL (ref 6–8)
RBC # BLD AUTO: 4.53 10*6/MM3 (ref 4.7–6.1)
SODIUM BLD-SCNC: 138 MMOL/L (ref 135–153)
WBC NRBC COR # BLD: 8.28 10*3/MM3 (ref 4.5–12.5)

## 2018-12-08 PROCEDURE — 99284 EMERGENCY DEPT VISIT MOD MDM: CPT

## 2018-12-08 PROCEDURE — 85025 COMPLETE CBC W/AUTO DIFF WBC: CPT | Performed by: FAMILY MEDICINE

## 2018-12-08 PROCEDURE — 25010000002 ONDANSETRON PER 1 MG: Performed by: FAMILY MEDICINE

## 2018-12-08 PROCEDURE — 85652 RBC SED RATE AUTOMATED: CPT | Performed by: FAMILY MEDICINE

## 2018-12-08 PROCEDURE — 96374 THER/PROPH/DIAG INJ IV PUSH: CPT

## 2018-12-08 PROCEDURE — 25010000002 MORPHINE PER 10 MG: Performed by: FAMILY MEDICINE

## 2018-12-08 PROCEDURE — 96375 TX/PRO/DX INJ NEW DRUG ADDON: CPT

## 2018-12-08 PROCEDURE — 80053 COMPREHEN METABOLIC PANEL: CPT | Performed by: FAMILY MEDICINE

## 2018-12-08 PROCEDURE — 86140 C-REACTIVE PROTEIN: CPT | Performed by: FAMILY MEDICINE

## 2018-12-08 RX ORDER — SODIUM CHLORIDE 0.9 % (FLUSH) 0.9 %
10 SYRINGE (ML) INJECTION AS NEEDED
Status: DISCONTINUED | OUTPATIENT
Start: 2018-12-08 | End: 2018-12-09 | Stop reason: HOSPADM

## 2018-12-08 RX ORDER — MORPHINE SULFATE 2 MG/ML
2 INJECTION, SOLUTION INTRAMUSCULAR; INTRAVENOUS ONCE
Status: COMPLETED | OUTPATIENT
Start: 2018-12-08 | End: 2018-12-08

## 2018-12-08 RX ORDER — ONDANSETRON 2 MG/ML
4 INJECTION INTRAMUSCULAR; INTRAVENOUS ONCE
Status: COMPLETED | OUTPATIENT
Start: 2018-12-08 | End: 2018-12-08

## 2018-12-08 RX ADMIN — SODIUM CHLORIDE 1000 ML: 9 INJECTION, SOLUTION INTRAVENOUS at 23:09

## 2018-12-08 RX ADMIN — MORPHINE SULFATE 2 MG: 2 INJECTION, SOLUTION INTRAMUSCULAR; INTRAVENOUS at 23:11

## 2018-12-08 RX ADMIN — ONDANSETRON 4 MG: 2 INJECTION, SOLUTION INTRAMUSCULAR; INTRAVENOUS at 23:10

## 2018-12-09 ENCOUNTER — APPOINTMENT (OUTPATIENT)
Dept: CT IMAGING | Facility: HOSPITAL | Age: 48
End: 2018-12-09

## 2018-12-09 VITALS
BODY MASS INDEX: 32.18 KG/M2 | WEIGHT: 205 LBS | DIASTOLIC BLOOD PRESSURE: 65 MMHG | RESPIRATION RATE: 16 BRPM | HEART RATE: 80 BPM | TEMPERATURE: 98 F | SYSTOLIC BLOOD PRESSURE: 150 MMHG | HEIGHT: 67 IN | OXYGEN SATURATION: 98 %

## 2018-12-09 PROCEDURE — 73700 CT LOWER EXTREMITY W/O DYE: CPT | Performed by: RADIOLOGY

## 2018-12-09 PROCEDURE — 73700 CT LOWER EXTREMITY W/O DYE: CPT

## 2018-12-09 RX ORDER — LISINOPRIL 20 MG/1
20 TABLET ORAL DAILY
Qty: 30 TABLET | Refills: 5 | Status: SHIPPED | OUTPATIENT
Start: 2018-12-09 | End: 2019-09-16 | Stop reason: SDUPTHER

## 2018-12-09 RX ORDER — POTASSIUM CHLORIDE 750 MG/1
10 TABLET, FILM COATED, EXTENDED RELEASE ORAL DAILY
Qty: 30 TABLET | Refills: 5 | Status: SHIPPED | OUTPATIENT
Start: 2018-12-09 | End: 2019-09-16 | Stop reason: SDUPTHER

## 2018-12-09 RX ORDER — BUDESONIDE AND FORMOTEROL FUMARATE DIHYDRATE 160; 4.5 UG/1; UG/1
AEROSOL RESPIRATORY (INHALATION)
Qty: 10.2 G | Refills: 5 | Status: SHIPPED | OUTPATIENT
Start: 2018-12-09 | End: 2019-09-16 | Stop reason: SDUPTHER

## 2018-12-09 RX ORDER — PRAVASTATIN SODIUM 80 MG/1
80 TABLET ORAL
Qty: 30 TABLET | Refills: 6 | Status: SHIPPED | OUTPATIENT
Start: 2018-12-09 | End: 2019-08-13

## 2018-12-09 RX ORDER — PHENYTOIN SODIUM 100 MG/1
200 CAPSULE, EXTENDED RELEASE ORAL EVERY MORNING
Qty: 150 CAPSULE | Refills: 3 | Status: SHIPPED | OUTPATIENT
Start: 2018-12-09 | End: 2019-06-03 | Stop reason: SDUPTHER

## 2018-12-09 RX ORDER — CYCLOBENZAPRINE HCL 10 MG
10 TABLET ORAL 2 TIMES DAILY PRN
Qty: 60 TABLET | Refills: 5 | Status: SHIPPED | OUTPATIENT
Start: 2018-12-09 | End: 2019-09-16 | Stop reason: SDUPTHER

## 2018-12-09 RX ORDER — TRAMADOL HYDROCHLORIDE 50 MG/1
50 TABLET ORAL EVERY 6 HOURS PRN
Qty: 15 TABLET | Refills: 0 | Status: SHIPPED | OUTPATIENT
Start: 2018-12-09 | End: 2019-01-28

## 2018-12-09 NOTE — ED PROVIDER NOTES
"Subjective   History of Present Illness    Review of Systems    Past Medical History:   Diagnosis Date   • Allergic    • Anxiety    • Arthritis    • Asthma    • Body piercing     REPORTS CYLICONE IN EARS   • Clotting disorder (CMS/[facility]) 2004    had a knee surgery   • Depression    • DVT (deep venous thrombosis) (CMS/[facility])     RIGHT RIGHT KNEE AFTER SURGERY YEARS AGO IN 2001 OR 2004   • Gastric ulcer    • GERD (gastroesophageal reflux disease)    • H/O migraine    • H/O sleep apnea     REPORTS DIAGNOSED WITH SLEEP APNEA AND COULDN'T STAND TO WEAR THE MACHINE   • Headache    • Heart attack (CMS/[facility])     REPORTS \"LIGHT HEART ATTACK A LONG TIME AGO\"  \"EARLY 90'S\"   • History of seizures     REPORTS LAST EPISODE WAS AROUND 1995.   • Hostility    • Hyperlipidemia    • Hypertension    • Knee pain, acute     Left   • Low back pain    • Migraine    • MRSA (methicillin resistant Staphylococcus aureus)     REPORTS LAST TESTED + 2004. WAS TREATED HE REPORTS.  RIGHT ARM, RIGHT KNEE.   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    • Obesity    • Poor historian    • Carl Mountain spotted fever    • Seizures (CMS/[facility])    • Tattoo    • Wears glasses        Allergies   Allergen Reactions   • Ciprofloxacin Anaphylaxis and Hives   • Paxil [Paroxetine Hcl] Shortness Of Breath     Chest pain    • Peanut-Containing Drug Products Anaphylaxis   • Penicillins Anaphylaxis   • Sulfa Antibiotics Anaphylaxis, Itching and Rash   • Isosorbide Nitrate Rash     Rash, hives, had to use inhaler.    • Fish-Derived Products Hives   • Mobic [Meloxicam] Other (See Comments)     Pt states, \"It make my feet and hands go numb and I can't hardly walk.\"    • Pristiq [Desvenlafaxine Succinate Er] Dizziness   • Robitussin Cough+ Chest Max St [Dextromethorphan-Guaifenesin] Unknown (See Comments)     Pt states, \"I don't remember its been so long.\"    • Zoloft [Sertraline Hcl] Hives and Itching   • Contrast Dye Itching and Rash   • Diltiazem Rash   • Gabapentin Rash   • Keflex [Cephalexin] " Rash   • Metoprolol Rash   • Prednisone Rash and Other (See Comments)     Face, feet, and legs go completely numb per patient   • Shrimp (Diagnostic) Rash   • Spironolactone Rash   • Viibryd [Vilazodone Hcl] Itching and Rash       Past Surgical History:   Procedure Laterality Date   • BACK SURGERY     • BRAIN SURGERY  1986    Tumor removal    • CARDIAC SURGERY      CARDIAC CATH REPORTED AS 2 MONTHS AGO IN [place]. REPORTS NO STENTS PLACED.   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       Family History   Problem Relation Age of Onset   • Diabetes Mother    • Hypertension Mother    • Stroke Mother    • Diabetes Father    • Skin cancer Father    • Hypertension Father    • Heart attack Father    • Diabetes Brother    • Hypertension Brother    • Heart disease Maternal Aunt    • Heart disease Maternal Uncle    • Heart disease Paternal Aunt    • Heart disease Paternal Uncle    • Heart disease Maternal Grandmother    • Heart disease Maternal Grandfather    • Heart disease Paternal Grandmother    • Heart disease Paternal Grandfather        Social History     Socioeconomic History   • Marital status: Legally      Spouse name: Becca   • Number of children: 2   • Years of education: 12   • Highest education level: Not on file   Social Needs   • Financial resource strain: Somewhat hard   • Food insecurity - worry: Sometimes true   • Food insecurity - inability: Sometimes true   • Transportation needs - medical: No   • Transportation needs - non-medical: No   Occupational History   • Occupation: DISABLED   Tobacco Use   • Smoking status: Current Every Day Smoker     Packs/day: 1.00     Years: 7.00     Pack years: 7.00     Types: Cigars, Cigarettes     Start date: 5/5/2010   • Smokeless tobacco:  Never Used   Substance and Sexual Activity   • Alcohol use: No   • Drug use: No   • Sexual activity: Defer           Objective   Physical Exam    Procedures           ED Course                  MDM      Final diagnoses:   Pain of left lower extremity   Gastrocnemius muscle strain, left, initial encounter            Rosanne Cronin DO  04/22/19 0989

## 2018-12-09 NOTE — ED NOTES
0316 PT IS AAO X4 PT HAS NO SIGNS OF DISTRESS BREATHING IS EQUAL AND UNLABORED SKIN PWD     Lisseth Morley, RN  12/09/18 7501

## 2018-12-09 NOTE — ED NOTES
PT IS REMAINING IN THIS ER DUE TO WAIT ON FOUR HOUR FABIÁN AFTER IV NARCOTIC TO BE ABLE TO LEAVE WITHOUT A      Lisseth Morley RN  12/09/18 0149       Lisseth Morley RN  12/09/18 0153

## 2018-12-09 NOTE — ED PROVIDER NOTES
Subjective   Pt states he was playing basketball and went up for a layup and moved wrong and felt a pop in in left calf muscle and immediately fell to the floor ; has been unable to bear weight on the leg from the pain        History provided by:  Patient  Lower Extremity Issue   Location:  Leg  Injury: no    Leg location:  L lower leg  Pain details:     Quality:  Aching and throbbing    Radiates to:  Does not radiate    Severity:  Severe    Onset quality:  Sudden    Timing:  Constant    Progression:  Unchanged  Chronicity:  New  Dislocation: no    Foreign body present:  No foreign bodies  Tetanus status:  Up to date  Prior injury to area:  No  Relieved by:  Nothing  Worsened by:  Activity  Ineffective treatments:  None tried  Associated symptoms: no back pain, no decreased ROM, no fatigue, no fever, no itching, no muscle weakness, no neck pain, no numbness, no stiffness, no swelling and no tingling    Risk factors: no concern for non-accidental trauma, no frequent fractures, no known bone disorder, no obesity and no recent illness        Review of Systems   Constitutional: Negative.  Negative for fatigue and fever.   HENT: Negative.    Respiratory: Negative.    Cardiovascular: Negative.  Negative for chest pain.   Gastrointestinal: Negative.  Negative for abdominal pain.   Endocrine: Negative.    Genitourinary: Negative.  Negative for dysuria.   Musculoskeletal: Negative for back pain, neck pain and stiffness.   Skin: Negative.  Negative for itching.   Neurological: Negative.    Psychiatric/Behavioral: Negative.    All other systems reviewed and are negative.      Past Medical History:   Diagnosis Date   • Allergic    • Anxiety    • Arthritis    • Asthma    • Body piercing     REPORTS CYLICONE IN EARS   • Clotting disorder (CMS/[facility]) 2004    had a knee surgery   • Depression    • DVT (deep venous thrombosis) (CMS/[facility])     RIGHT RIGHT KNEE AFTER SURGERY YEARS AGO IN 2001 OR 2004   • Gastric ulcer    • GERD  "(gastroesophageal reflux disease)    • H/O migraine    • H/O sleep apnea     REPORTS DIAGNOSED WITH SLEEP APNEA AND COULDN'T STAND TO WEAR THE MACHINE   • Headache    • Heart attack (CMS/HCC)     REPORTS \"LIGHT HEART ATTACK A LONG TIME AGO\"  \"EARLY 90'S\"   • History of seizures     REPORTS LAST EPISODE WAS AROUND 1995.   • Hostility    • Hyperlipidemia    • Hypertension    • Knee pain, acute     Left   • Low back pain    • Migraine    • MRSA (methicillin resistant Staphylococcus aureus)     REPORTS LAST TESTED + 2004. WAS TREATED HE REPORTS.  RIGHT ARM, RIGHT KNEE.   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    • Fish-Derived Products Hives   • Mobic [Meloxicam] Other (See Comments)     Pt states, \"It make my feet and hands go numb and I can't hardly walk.\"    • Pristiq [Desvenlafaxine Succinate Er] Dizziness   • Robitussin Cough+ Chest Max St [Dextromethorphan-Guaifenesin] Unknown (See Comments)     Pt states, \"I don't remember its been so long.\"    • Zoloft [Sertraline Hcl] Hives and Itching   • Contrast Dye Itching and Rash   • Diltiazem Rash   • Gabapentin Rash   • Keflex [Cephalexin] Rash   • Metoprolol Rash   • Prednisone Rash and Other (See Comments)     Face, feet, and legs go completely numb per patient   • Shrimp (Diagnostic) Rash   • Spironolactone Rash   • Viibryd [Vilazodone Hcl] Itching and Rash       Past Surgical History:   Procedure Laterality Date   • BACK SURGERY     • BRAIN SURGERY  1986    Tumor removal    • CARDIAC SURGERY      CARDIAC CATH REPORTED AS 2 MONTHS AGO IN " [place]. REPORTS NO STENTS PLACED.   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       Family History   Problem Relation Age of Onset   • Diabetes Mother    • Hypertension Mother    • Stroke Mother    • Diabetes Father    • Skin cancer Father    • Hypertension Father    • Heart attack Father    • Diabetes Brother    • Hypertension Brother    • Heart disease Maternal Aunt    • Heart disease Maternal Uncle    • Heart disease Paternal Aunt    • Heart disease Paternal Uncle    • Heart disease Maternal Grandmother    • Heart disease Maternal Grandfather    • Heart disease Paternal Grandmother    • Heart disease Paternal Grandfather        Social History     Socioeconomic History   • Marital status: Legally      Spouse name: Becca   • Number of children: 2   • Years of education: 12   • Highest education level: Not on file   Social Needs   • Financial resource strain: Somewhat hard   • Food insecurity - worry: Sometimes true   • Food insecurity - inability: Sometimes true   • Transportation needs - medical: No   • Transportation needs - non-medical: No   Occupational History   • Occupation: DISABLED   Tobacco Use   • Smoking status: Current Every Day Smoker     Packs/day: 1.00     Years: 7.00     Pack years: 7.00     Types: Cigars, Cigarettes     Start date: 5/5/2010   • Smokeless tobacco: Never Used   Substance and Sexual Activity   • Alcohol use: No   • Drug use: No   • Sexual activity: Defer           Objective   Physical Exam   Constitutional: He is oriented to person, place, and time. He appears well-developed and well-nourished. He appears distressed.   HENT:   Head: Normocephalic and atraumatic.   Right Ear: External ear normal.   Left Ear: External ear normal.   Mouth/Throat: Oropharynx  is clear and moist.   Eyes: EOM are normal. Pupils are equal, round, and reactive to light.   Neck: Neck supple.   Cardiovascular: Normal rate and regular rhythm.   Pulmonary/Chest: Effort normal and breath sounds normal.   Abdominal: Soft. Bowel sounds are normal.   Musculoskeletal: He exhibits tenderness.   Decreased ROM   Neurological: He is alert and oriented to person, place, and time.   Skin: Skin is warm. Capillary refill takes less than 2 seconds.   Psychiatric: He has a normal mood and affect. His behavior is normal. Judgment and thought content normal.   Nursing note and vitals reviewed.      Procedures           ED Course                  MDM  Number of Diagnoses or Management Options  Gastrocnemius muscle strain, left, initial encounter: new and requires workup  Pain of left lower extremity: new and requires workup     Amount and/or Complexity of Data Reviewed  Clinical lab tests: ordered and reviewed  Tests in the radiology section of CPT®: reviewed and ordered  Tests in the medicine section of CPT®: reviewed and ordered  Independent visualization of images, tracings, or specimens: yes    Risk of Complications, Morbidity, and/or Mortality  Presenting problems: moderate  Diagnostic procedures: moderate  Management options: moderate    Patient Progress  Patient progress: stable        Final diagnoses:   Pain of left lower extremity   Gastrocnemius muscle strain, left, initial encounter            Rosanne Cronin DO  12/09/18 0351

## 2018-12-10 RX ORDER — VILAZODONE HYDROCHLORIDE 20 MG/1
TABLET ORAL
Qty: 30 TABLET | Refills: 0 | OUTPATIENT
Start: 2018-12-10

## 2018-12-10 RX ORDER — DESVENLAFAXINE SUCCINATE 50 MG/1
50 TABLET, EXTENDED RELEASE ORAL DAILY
Qty: 30 TABLET | Refills: 0 | OUTPATIENT
Start: 2018-12-10

## 2018-12-10 NOTE — TELEPHONE ENCOUNTER
I did not approve refill on Viibryd or Pristiq as patient is no longer taking due to side effects. Thank you.

## 2019-01-03 ENCOUNTER — APPOINTMENT (OUTPATIENT)
Dept: GENERAL RADIOLOGY | Facility: HOSPITAL | Age: 49
End: 2019-01-03

## 2019-01-03 ENCOUNTER — HOSPITAL ENCOUNTER (EMERGENCY)
Facility: HOSPITAL | Age: 49
Discharge: HOME | End: 2019-01-03
Attending: EMERGENCY MEDICINE | Admitting: EMERGENCY MEDICINE

## 2019-01-03 VITALS
TEMPERATURE: 97.8 F | BODY MASS INDEX: 29.82 KG/M2 | OXYGEN SATURATION: 98 % | HEIGHT: 67 IN | DIASTOLIC BLOOD PRESSURE: 84 MMHG | RESPIRATION RATE: 18 BRPM | SYSTOLIC BLOOD PRESSURE: 133 MMHG | WEIGHT: 190 LBS | HEART RATE: 90 BPM

## 2019-01-03 DIAGNOSIS — Z72.0 TOBACCO ABUSE: ICD-10-CM

## 2019-01-03 DIAGNOSIS — J06.9 VIRAL URI WITH COUGH: Primary | ICD-10-CM

## 2019-01-03 LAB
ALBUMIN SERPL-MCNC: 4 G/DL (ref 3.5–5)
ALBUMIN/GLOB SERPL: 1.3 G/DL (ref 1.5–2.5)
ALP SERPL-CCNC: 84 U/L (ref 40–129)
ALT SERPL W P-5'-P-CCNC: 30 U/L (ref 10–44)
ANION GAP SERPL CALCULATED.3IONS-SCNC: 5.6 MMOL/L (ref 3.6–11.2)
AST SERPL-CCNC: 30 U/L (ref 10–34)
BASOPHILS # BLD AUTO: 0.02 10*3/MM3 (ref 0–0.3)
BASOPHILS NFR BLD AUTO: 0.3 % (ref 0–2)
BILIRUB SERPL-MCNC: 0.2 MG/DL (ref 0.2–1.8)
BUN BLD-MCNC: 16 MG/DL (ref 7–21)
BUN/CREAT SERPL: 16.2 (ref 7–25)
CALCIUM SPEC-SCNC: 8.9 MG/DL (ref 7.7–10)
CHLORIDE SERPL-SCNC: 103 MMOL/L (ref 99–112)
CO2 SERPL-SCNC: 28.4 MMOL/L (ref 24.3–31.9)
CREAT BLD-MCNC: 0.99 MG/DL (ref 0.43–1.29)
DEPRECATED RDW RBC AUTO: 43 FL (ref 37–54)
EOSINOPHIL # BLD AUTO: 0.15 10*3/MM3 (ref 0–0.7)
EOSINOPHIL NFR BLD AUTO: 2 % (ref 0–5)
ERYTHROCYTE [DISTWIDTH] IN BLOOD BY AUTOMATED COUNT: 12.9 % (ref 11.5–14.5)
FLUAV AG NPH QL: NEGATIVE
FLUBV AG NPH QL IA: NEGATIVE
GFR SERPL CREATININE-BSD FRML MDRD: 81 ML/MIN/1.73
GLOBULIN UR ELPH-MCNC: 3.2 GM/DL
GLUCOSE BLD-MCNC: 109 MG/DL (ref 70–110)
HCT VFR BLD AUTO: 43.2 % (ref 42–52)
HGB BLD-MCNC: 15 G/DL (ref 14–18)
IMM GRANULOCYTES # BLD AUTO: 0.01 10*3/MM3 (ref 0–0.03)
IMM GRANULOCYTES NFR BLD AUTO: 0.1 % (ref 0–0.5)
LYMPHOCYTES # BLD AUTO: 2.19 10*3/MM3 (ref 1–3)
LYMPHOCYTES NFR BLD AUTO: 28.9 % (ref 21–51)
MCH RBC QN AUTO: 32.6 PG (ref 27–33)
MCHC RBC AUTO-ENTMCNC: 34.7 G/DL (ref 33–37)
MCV RBC AUTO: 93.9 FL (ref 80–94)
MONOCYTES # BLD AUTO: 1.07 10*3/MM3 (ref 0.1–0.9)
MONOCYTES NFR BLD AUTO: 14.1 % (ref 0–10)
NEUTROPHILS # BLD AUTO: 4.13 10*3/MM3 (ref 1.4–6.5)
NEUTROPHILS NFR BLD AUTO: 54.6 % (ref 30–70)
OSMOLALITY SERPL CALC.SUM OF ELEC: 275.6 MOSM/KG (ref 273–305)
PLATELET # BLD AUTO: 186 10*3/MM3 (ref 130–400)
PMV BLD AUTO: 9.6 FL (ref 6–10)
POTASSIUM BLD-SCNC: 3.7 MMOL/L (ref 3.5–5.3)
PROT SERPL-MCNC: 7.2 G/DL (ref 6–8)
RBC # BLD AUTO: 4.6 10*6/MM3 (ref 4.7–6.1)
SODIUM BLD-SCNC: 137 MMOL/L (ref 135–153)
TROPONIN I SERPL-MCNC: <0.006 NG/ML
WBC NRBC COR # BLD: 7.57 10*3/MM3 (ref 4.5–12.5)

## 2019-01-03 PROCEDURE — 96360 HYDRATION IV INFUSION INIT: CPT

## 2019-01-03 PROCEDURE — 93005 ELECTROCARDIOGRAM TRACING: CPT | Performed by: EMERGENCY MEDICINE

## 2019-01-03 PROCEDURE — 99285 EMERGENCY DEPT VISIT HI MDM: CPT

## 2019-01-03 PROCEDURE — 96374 THER/PROPH/DIAG INJ IV PUSH: CPT

## 2019-01-03 PROCEDURE — 85025 COMPLETE CBC W/AUTO DIFF WBC: CPT | Performed by: EMERGENCY MEDICINE

## 2019-01-03 PROCEDURE — 94799 UNLISTED PULMONARY SVC/PX: CPT

## 2019-01-03 PROCEDURE — 94640 AIRWAY INHALATION TREATMENT: CPT

## 2019-01-03 PROCEDURE — 96375 TX/PRO/DX INJ NEW DRUG ADDON: CPT

## 2019-01-03 PROCEDURE — 87804 INFLUENZA ASSAY W/OPTIC: CPT | Performed by: EMERGENCY MEDICINE

## 2019-01-03 PROCEDURE — 84484 ASSAY OF TROPONIN QUANT: CPT | Performed by: EMERGENCY MEDICINE

## 2019-01-03 PROCEDURE — 80053 COMPREHEN METABOLIC PANEL: CPT | Performed by: EMERGENCY MEDICINE

## 2019-01-03 PROCEDURE — 93010 ELECTROCARDIOGRAM REPORT: CPT | Performed by: INTERNAL MEDICINE

## 2019-01-03 PROCEDURE — 71046 X-RAY EXAM CHEST 2 VIEWS: CPT | Performed by: RADIOLOGY

## 2019-01-03 PROCEDURE — 71046 X-RAY EXAM CHEST 2 VIEWS: CPT

## 2019-01-03 RX ORDER — ALBUTEROL SULFATE 2.5 MG/3ML
2.5 SOLUTION RESPIRATORY (INHALATION) ONCE
Status: COMPLETED | OUTPATIENT
Start: 2019-01-03 | End: 2019-01-03

## 2019-01-03 RX ORDER — SODIUM CHLORIDE 0.9 % (FLUSH) 0.9 %
10 SYRINGE (ML) INJECTION AS NEEDED
Status: DISCONTINUED | OUTPATIENT
Start: 2019-01-03 | End: 2019-01-03 | Stop reason: HOSPADM

## 2019-01-03 RX ADMIN — SODIUM CHLORIDE 1000 ML: 9 INJECTION, SOLUTION INTRAVENOUS at 02:24

## 2019-01-03 RX ADMIN — ALBUTEROL SULFATE 2.5 MG: 2.5 SOLUTION RESPIRATORY (INHALATION) at 02:29

## 2019-01-03 NOTE — ED PROVIDER NOTES
"Subjective   History of Present Illness  TRIAGE CHIEF COMPLAINT:   Chief Complaint   Patient presents with   • Chest Pain   • Shortness of Breath         HPI: Jaquan Mckay   is a 48 y.o. male   who presents to the emergency department complaining of URI symptoms.  Patient with a history of asthma, hypertension, peptic ulcer disease, epilepsy, migraines, anxiety, depression.  Reports 2 weeks of dyspnea with dry nonproductive cough and nasal congestion.  Patient smokes 2 packs per day.  Also reports 1 week of aching substernal chest pain.  He underwent a heart cath on 9/28/18 which was essentially unremarkable.  Denies measured fever, nausea, vomiting, near-syncope, palpitations, diaphoresis, abdominal pain.            Review of Systems   All other systems reviewed and are negative.      Past Medical History:   Diagnosis Date   • Allergic    • Anxiety    • Arthritis    • Asthma    • Body piercing     REPORTS CYLICONE IN EARS   • Clotting disorder (CMS/[facility]) 2004    had a knee surgery   • Depression    • DVT (deep venous thrombosis) (CMS/[facility])     RIGHT RIGHT KNEE AFTER SURGERY YEARS AGO IN 2001 OR 2004   • Gastric ulcer    • GERD (gastroesophageal reflux disease)    • H/O migraine    • H/O sleep apnea     REPORTS DIAGNOSED WITH SLEEP APNEA AND COULDN'T STAND TO WEAR THE MACHINE   • Headache    • Heart attack (CMS/[facility])     REPORTS \"LIGHT HEART ATTACK A LONG TIME AGO\"  \"EARLY 90'S\"   • History of seizures     REPORTS LAST EPISODE WAS AROUND 1995.   • Hostility    • Hyperlipidemia    • Hypertension    • Knee pain, acute     Left   • Low back pain    • Migraine    • MRSA (methicillin resistant Staphylococcus aureus)     REPORTS LAST TESTED + 2004. WAS TREATED HE REPORTS.  RIGHT ARM, RIGHT KNEE.   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    • Doxycycline Hives   • Fish-Derived Products Hives   • Mobic [Meloxicam] Other (See Comments)     Pt states, \"It make my feet and hands go numb and I can't hardly walk.\"    • Pristiq [Desvenlafaxine Succinate Er] Dizziness   • Robitussin Cough+ Chest Max St [Dextromethorphan-Guaifenesin] Unknown (See Comments)     Pt states, \"I don't remember its been so long.\"    • Zoloft [Sertraline Hcl] Hives and Itching   • Contrast Dye Itching and Rash   • Diltiazem Rash   • Gabapentin Rash   • Keflex [Cephalexin] Rash   • Metoprolol Rash   • Prednisone Rash and Other (See Comments)     Face, feet, and legs go completely numb per patient   • Shrimp (Diagnostic) Rash   • Spironolactone Rash   • Viibryd [Vilazodone Hcl] Itching and Rash       Past Surgical History:   Procedure Laterality Date   • BACK SURGERY     • BRAIN SURGERY  1986    Tumor removal    • CARDIAC CATHETERIZATION N/A 9/28/2018    Procedure: Left Heart Cath;  Surgeon: Leandro Daily MD;  Location: [facility] INVASIVE LOCATION;  Service: Cardiology   • CARDIAC SURGERY      CARDIAC CATH REPORTED AS 2 MONTHS AGO IN [place]. REPORTS NO STENTS PLACED.   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       Family History   Problem Relation Age of Onset   • Diabetes Mother    • Hypertension Mother    • Stroke Mother    • Diabetes Father    • Skin " cancer Father    • Hypertension Father    • Heart attack Father    • Diabetes Brother    • Hypertension Brother    • Heart disease Maternal Aunt    • Heart disease Maternal Uncle    • Heart disease Paternal Aunt    • Heart disease Paternal Uncle    • Heart disease Maternal Grandmother    • Heart disease Maternal Grandfather    • Heart disease Paternal Grandmother    • Heart disease Paternal Grandfather        Social History     Socioeconomic History   • Marital status:      Spouse name: Becca   • Number of children: 2   • Years of education: 12   • Highest education level: Not on file   Social Needs   • Financial resource strain: Somewhat hard   • Food insecurity - worry: Sometimes true   • Food insecurity - inability: Sometimes true   • Transportation needs - medical: No   • Transportation needs - non-medical: No   Occupational History   • Occupation: DISABLED   Tobacco Use   • Smoking status: Current Every Day Smoker     Packs/day: 1.00     Years: 7.00     Pack years: 7.00     Types: Cigars, Cigarettes     Start date: 5/5/2010   • Smokeless tobacco: Never Used   Substance and Sexual Activity   • Alcohol use: No   • Drug use: No   • Sexual activity: Defer           Objective   Physical Exam        CONSTITUTIONAL: Awake, oriented, appears non-toxic   HENT: Atraumatic, normocephalic, oral mucosa pink and moist, airway patent. Nares patent without drainage. External ears normal.   EYES: Conjunctiva clear, EOMI, PERRL   NECK: Trachea midline, non-tender, supple   CARDIOVASCULAR: Normal heart rate, Normal rhythm, No murmurs, rubs, gallops   PULMONARY/CHEST: Occasional cough.  Clear to auscultation, no rhonchi, wheezes, or rales. Symmetrical breath sounds. Non-tender.   ABDOMINAL: Non-distended, soft, non-tender - no rebound or guarding. BS normal.   NEUROLOGIC: Non-focal, moving all four extremities, no gross sensory or motor deficits.   EXTREMITIES: No clubbing, cyanosis, or edema   SKIN: Warm, Dry, No  erythema, No rash     XR Chest 2 View    (Results Pending)     CXR: no acute findings      EKG:  NSR rate 96, no acute ST changes, T-wave inversions, or ectopy          Procedures           ED Course        ED COURSE / MEDICAL DECISION MAKING:   Nursing notes reviewed.    Patient was symptoms of uncomplicated viral URI.  Workup is unremarkable and he is well-appearing.  Encouraged to quit smoking.    DECISION TO DISCHARGE/ADMIT: see ED care timeline       Electronically signed by: Mario Giang MD, 1/3/2019 3:27 AM                [facility]  Final diagnoses:   Viral URI with cough   Tobacco abuse            Mario Giang MD  01/03/19 0327

## 2019-01-07 ENCOUNTER — OFFICE VISIT (OUTPATIENT)
Dept: FAMILY MEDICINE CLINIC | Facility: CLINIC | Age: 49
End: 2019-01-07

## 2019-01-07 VITALS
OXYGEN SATURATION: 99 % | HEIGHT: 67 IN | BODY MASS INDEX: 33.43 KG/M2 | DIASTOLIC BLOOD PRESSURE: 82 MMHG | WEIGHT: 213 LBS | SYSTOLIC BLOOD PRESSURE: 128 MMHG | TEMPERATURE: 98.4 F | HEART RATE: 98 BPM

## 2019-01-07 DIAGNOSIS — G89.29 CHRONIC BILATERAL LOW BACK PAIN WITH LEFT-SIDED SCIATICA: ICD-10-CM

## 2019-01-07 DIAGNOSIS — E66.9 OBESITY (BMI 30.0-34.9): ICD-10-CM

## 2019-01-07 DIAGNOSIS — K21.9 GASTROESOPHAGEAL REFLUX DISEASE WITHOUT ESOPHAGITIS: ICD-10-CM

## 2019-01-07 DIAGNOSIS — M54.42 CHRONIC BILATERAL LOW BACK PAIN WITH LEFT-SIDED SCIATICA: ICD-10-CM

## 2019-01-07 DIAGNOSIS — J45.30 MILD PERSISTENT ASTHMA WITHOUT COMPLICATION: ICD-10-CM

## 2019-01-07 DIAGNOSIS — M94.262 CHONDROMALACIA OF LEFT KNEE: ICD-10-CM

## 2019-01-07 DIAGNOSIS — J06.9 ACUTE URI: Primary | ICD-10-CM

## 2019-01-07 PROCEDURE — 99214 OFFICE O/P EST MOD 30 MIN: CPT | Performed by: NURSE PRACTITIONER

## 2019-01-07 RX ORDER — FAMOTIDINE 40 MG/1
40 TABLET, FILM COATED ORAL DAILY
Qty: 30 TABLET | Refills: 5 | Status: SHIPPED | OUTPATIENT
Start: 2019-01-07 | End: 2019-09-16 | Stop reason: SDUPTHER

## 2019-01-07 RX ORDER — PHENTERMINE HYDROCHLORIDE 37.5 MG/1
37.5 TABLET ORAL
Qty: 30 TABLET | Refills: 0 | Status: SHIPPED | OUTPATIENT
Start: 2019-01-07 | End: 2019-02-06 | Stop reason: SDUPTHER

## 2019-01-07 RX ORDER — HYDROCODONE BITARTRATE AND ACETAMINOPHEN 10; 325 MG/1; MG/1
1 TABLET ORAL 2 TIMES DAILY PRN
Qty: 60 TABLET | Refills: 0 | Status: SHIPPED | OUTPATIENT
Start: 2019-01-07 | End: 2019-02-06 | Stop reason: SDUPTHER

## 2019-01-07 RX ORDER — ALBUTEROL SULFATE 90 UG/1
2 AEROSOL, METERED RESPIRATORY (INHALATION) EVERY 4 HOURS PRN
Qty: 1 INHALER | Refills: 5 | Status: SHIPPED | OUTPATIENT
Start: 2019-01-07 | End: 2019-09-16 | Stop reason: SDUPTHER

## 2019-01-07 RX ORDER — AZITHROMYCIN 250 MG/1
TABLET, FILM COATED ORAL
Qty: 6 TABLET | Refills: 0 | Status: SHIPPED | OUTPATIENT
Start: 2019-01-07 | End: 2019-01-29

## 2019-01-07 NOTE — PROGRESS NOTES
"Subjective   Jaquan Mckay is a 48 y.o. male.     Chief Complaint   Patient presents with   • Knee Pain   • Depression       History of Present Illness     ED follow up-reports he has been seen at hospital for URI symtpoms.  Was given breathing treatment due to SOA and cough.  He was not prescribed any other meds to take.    Doxy reaction-reports he had to stop the Doxycycline due to reaction.  \"sores everywhere\".  He reports in his head, on arms, and legs. Not at goal.   HTN-stable today.  Taking ASA 81 mg, Lisinopril 20 mg as directed.  No negative side effects.   Knee pain-bilateral but worse on left.  Pain is chronic in nature.  Numbness on lateral side of leg continues.  He has been to ortho but \"cannot do anything else for it\".  He does use norco 10 mg BID as directed.  No negative side effects.  He request to go to [place] but \"not Dr Aguirre\" to have a second opinion about his knee.  Not at goal.    Fall-last Tuesday.  reprots he was going outside his house and fell down step (2 steps).  He reports left knee gave out and it was raining and he couldn't keep his grasp on the handrail of his steps.  He has a bruise onto his right medial knee.  He reports leg numbness seems to be progressive and radiating up into his thigh.   He was evaluated per EMS but did not go to the hospital.  Not at goal  Elevated Prolactin-he reports he missed his Neuro appt. He has not rescheduled.  He reports he missed due to transportation concerns.  Not at goal.   GERD-reports an increase in GERD symptoms.  He is taking Protonix 40 mg daily but has been out of refills on zantac 300 mg BID.  Not at goal.      The following portions of the patient's history were reviewed and updated as appropriate: allergies, current medications, past family history, past medical history, past social history, past surgical history and problem list.    Review of Systems   Constitutional: Positive for fatigue. Negative for appetite change and unexpected " "weight change.   HENT: Negative for congestion, ear pain, nosebleeds, postnasal drip, rhinorrhea, sinus pressure, sinus pain, sore throat, trouble swallowing and voice change.    Eyes: Negative for pain and visual disturbance.   Respiratory: Positive for cough. Negative for chest tightness, shortness of breath and wheezing.    Cardiovascular: Positive for chest pain and leg swelling. Negative for palpitations.   Gastrointestinal: Positive for abdominal pain. Negative for constipation, diarrhea and vomiting.   Endocrine: Negative for cold intolerance and polydipsia.   Genitourinary: Negative for difficulty urinating, dysuria, flank pain and hematuria.   Musculoskeletal: Positive for arthralgias, back pain and joint swelling. Negative for gait problem and myalgias.   Skin: Negative for color change and rash.   Allergic/Immunologic: Negative.    Neurological: Positive for dizziness, weakness, numbness and headaches. Negative for seizures and syncope.   Hematological: Bruises/bleeds easily.   Psychiatric/Behavioral: Positive for agitation, decreased concentration, dysphoric mood and sleep disturbance. Negative for suicidal ideas. The patient is nervous/anxious.    All other systems reviewed and are negative.      Objective     /82   Pulse 98   Temp 98.4 °F (36.9 °C) (Temporal)   Ht 170.2 cm (67\")   Wt 96.6 kg (213 lb)   SpO2 99%   BMI 33.36 kg/m²     Physical Exam   Constitutional: He is oriented to person, place, and time. He appears well-developed and well-nourished.   HENT:   Head: Normocephalic and atraumatic.   Right Ear: External ear and ear canal normal. Tympanic membrane is erythematous. A middle ear effusion is present.   Left Ear: External ear and ear canal normal. Tympanic membrane is erythematous. A middle ear effusion is present.   Nose: Mucosal edema and rhinorrhea present. Right sinus exhibits maxillary sinus tenderness and frontal sinus tenderness. Left sinus exhibits maxillary sinus " tenderness and frontal sinus tenderness.   Mouth/Throat: Oropharyngeal exudate and posterior oropharyngeal erythema present.   edentulous     Eyes: Conjunctivae and EOM are normal. Pupils are equal, round, and reactive to light. No scleral icterus.   Neck: Normal range of motion. Neck supple. No JVD present. No thyromegaly present.   Cardiovascular: Normal rate, regular rhythm and normal heart sounds.   No murmur heard.  Pulmonary/Chest: Effort normal. No respiratory distress. He has no decreased breath sounds. He has no wheezes. He exhibits no tenderness.   Abdominal: Soft. Bowel sounds are normal. He exhibits no mass. There is no tenderness.   Musculoskeletal: He exhibits tenderness. He exhibits no edema.        Right elbow: He exhibits normal range of motion. Tenderness found. Medial epicondyle and olecranon process tenderness noted.        Left knee: He exhibits decreased range of motion, swelling (mild) and bony tenderness. He exhibits no effusion, no ecchymosis and no erythema. Tenderness found. Medial joint line and lateral joint line tenderness noted.        Lumbar back: He exhibits decreased range of motion, tenderness, pain and spasm. He exhibits no swelling.        Right hand: He exhibits tenderness (3rd digit). He exhibits normal two-point discrimination, normal capillary refill and no swelling.     Skin Integrity  -  His right foot skin is intact.His left foot skin is intact..  Lymphadenopathy:        Head (right side): No submandibular adenopathy present.        Head (left side): No submandibular adenopathy present.     He has cervical adenopathy.        Right cervical: Superficial cervical adenopathy present.        Left cervical: Superficial cervical adenopathy present.   Neurological: He is alert and oriented to person, place, and time. He has normal reflexes. No cranial nerve deficit. He exhibits normal muscle tone. Coordination normal.   Skin: Skin is warm and dry. Capillary refill takes less than  2 seconds.   Psychiatric: His speech is normal. Judgment and thought content normal. His mood appears anxious. He is not actively hallucinating. Cognition and memory are normal. He exhibits a depressed mood. He expresses no homicidal and no suicidal ideation. He exhibits normal recent memory and normal remote memory.   Flat affect He is attentive.   Vitals reviewed.      Assessment/Plan     Problem List Items Addressed This Visit        Respiratory    Mild persistent asthma without complication    Relevant Medications    albuterol sulfate  (90 Base) MCG/ACT inhaler       Digestive    Gastroesophageal reflux disease without esophagitis    Current Assessment & Plan     Unstable.  Trial of Pepcid 40 mg.  Stop Protonix.  Advised to avoid known GI triggers such as spicy foods.  Upright 30 minutes after meals and avoid eating large meals.  Several small meals daily as able to avoid overfilling stomach.             Relevant Medications    famotidine (PEPCID) 40 MG tablet       Nervous and Auditory    Chronic bilateral low back pain with left-sided sciatica    Relevant Medications    HYDROcodone-acetaminophen (NORCO)  MG per tablet    Other Relevant Orders    Urine Drug Screen - Urine, Clean Catch       Musculoskeletal and Integument    Chondromalacia, knee    Relevant Medications    HYDROcodone-acetaminophen (NORCO)  MG per tablet    Other Relevant Orders    Urine Drug Screen - Urine, Clean Catch    Ambulatory Referral to Orthopedic Surgery       Other    Obesity (BMI 30.0-34.9)    Relevant Medications    phentermine (ADIPEX-P) 37.5 MG tablet      Other Visit Diagnoses     Acute URI    -  Primary    Relevant Medications    azithromycin (ZITHROMAX) 250 MG tablet          Patient's Body mass index is 33.36 kg/m². BMI is above normal parameters. Recommendations include: nutrition counseling.   (Normal BMI:  18.5-24.9, OW 25-29.9, Obesity 30 or greater)  Encouraged smoking cessation.      Understands disease  processes and need for medications.  Understands reasons for urgent and emergent care.  Patient (& family) verbalized agreement for treatment plan.   Emotional support and active listening provided.  Patient provided time to verbalize feelings.    CHAVEZ reviewed today and consistent.  Will refill prescribed controlled medication today.  Patient is aware they cannot receive narcotics from any other provider except if under care of pain management or speciality clinic.  Risk and benefits of medication use has been reviewed.  History and physical exam exhibit continued safe and appropriate use of controlled substances.  The patient is aware of the potential for addiction and dependence.  This patient has been made aware of the appropriate use of such medications, including potential risk of somnolence, limited ability to drive and / or work safely, and potential for overdose.    It has also been made clear that these medications are for use by this patient only, without concomitant use of alcohol or other substances unless prescribed/advised by medical provider.  Patient understands they may be subject to UDS and pill counts at random.    Patient considered to be moderate risk for addiction due to use of multiple controlled medications.  Patient understands and accepts these risks.      UDS requested per [facility] while patient in office today.  Buccal swab or blood sample will be obtained if patient is unable to provide specimen.     Will resume weight loss meds.  Continue to be more active.    Dietary counseling provided:  Healthy food choices including fruits and vegetables, limit soda and junk foods, adequate water intake.    New referral to Ortho.  Understands they may choose not to see if for post surgical complaints if they are not performing provider.      RTC 1 month, sooner if needed.           This document has been electronically signed by:  BALDOMERO Thao, NAOMI

## 2019-01-07 NOTE — ASSESSMENT & PLAN NOTE
Unstable.  Trial of Pepcid 40 mg.  Stop Protonix.  Advised to avoid known GI triggers such as spicy foods.  Upright 30 minutes after meals and avoid eating large meals.  Several small meals daily as able to avoid overfilling stomach.

## 2019-01-23 ENCOUNTER — OFFICE VISIT (OUTPATIENT)
Dept: ORTHOPEDIC SURGERY | Facility: CLINIC | Age: 49
End: 2019-01-23

## 2019-01-23 VITALS — WEIGHT: 206.57 LBS | OXYGEN SATURATION: 99 % | BODY MASS INDEX: 34.42 KG/M2 | HEART RATE: 91 BPM | HEIGHT: 65 IN

## 2019-01-23 DIAGNOSIS — M94.20 CHONDROMALACIA: ICD-10-CM

## 2019-01-23 DIAGNOSIS — F11.20 OPIOID DEPENDENCE WITH CURRENT USE (HCC): ICD-10-CM

## 2019-01-23 DIAGNOSIS — M25.562 LEFT KNEE PAIN, UNSPECIFIED CHRONICITY: Primary | ICD-10-CM

## 2019-01-23 PROCEDURE — 99214 OFFICE O/P EST MOD 30 MIN: CPT | Performed by: ORTHOPAEDIC SURGERY

## 2019-01-23 NOTE — PROGRESS NOTES
"    [facility] Note    Subjective     Chief Complaint   Patient presents with   • Left Knee - Pain     Diagnostic Arthroscopy Left Knee with Chondroplasty 10/20/2017 by Dr. Marco Aguirre         HPI      Jaquan Mckay is a 48 y.o. male.  Patient complains of left knee pain.  He had a knee scope October 2017 by Dr. Aguirer in [place].  He wishes he never had surgery.  He says is worse.  He is on disability for a seizure disorder.  He takes hydrocodone as prescribed by his family doctor for pain management.  His pain is 8 out of 10 burning throbbing stabbing shooting.  He has a numb spot on his lateral knee.  He's tried a walker anti-inflammatories physical therapy and Orthovisc injections.        Past Medical History:   Diagnosis Date   • Allergic    • Anxiety    • Arthritis    • Asthma    • Body piercing     REPORTS CYLICONE IN EARS   • Clotting disorder (CMS/HCC) 2004    had a knee surgery   • Depression    • DVT (deep venous thrombosis) (CMS/HCC)     RIGHT RIGHT KNEE AFTER SURGERY YEARS AGO IN 2001 OR 2004   • Gastric ulcer    • GERD (gastroesophageal reflux disease)    • H/O migraine    • H/O sleep apnea     REPORTS DIAGNOSED WITH SLEEP APNEA AND COULDN'T STAND TO WEAR THE MACHINE   • Headache    • Heart attack (CMS/HCC)     REPORTS \"LIGHT HEART ATTACK A LONG TIME AGO\"  \"EARLY 90'S\"   • History of seizures     REPORTS LAST EPISODE WAS AROUND 1995.   • Hostility    • Hyperlipidemia    • Hypertension    • Knee pain, acute     Left   • Low back pain    • Migraine    • MRSA (methicillin resistant Staphylococcus aureus)     REPORTS LAST TESTED + 2004. WAS TREATED HE REPORTS.  RIGHT ARM, RIGHT KNEE.   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    • Obesity    • Poor historian    • Carl Mountain spotted fever    • Seizures (CMS/HCC)    • Tattoo    • Wears glasses       Past Surgical History:   Procedure Laterality Date   • BACK SURGERY     • BRAIN SURGERY  1986    Tumor removal    • CARDIAC CATHETERIZATION N/A 9/28/2018 "    Procedure: Left Heart Cath;  Surgeon: Leandro Daily MD;  Location:  [facility];  Service: Cardiology   • CARDIAC SURGERY      CARDIAC CATH REPORTED AS 2 MONTHS AGO IN [place]. REPORTS NO STENTS PLACED.   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      Family History   Problem Relation Age of Onset   • Diabetes Mother    • Hypertension Mother    • Stroke Mother    • Diabetes Father    • Skin cancer Father    • Hypertension Father    • Heart attack Father    • Diabetes Brother    • Hypertension Brother    • Heart disease Maternal Aunt    • Heart disease Maternal Uncle    • Heart disease Paternal Aunt    • Heart disease Paternal Uncle    • Heart disease Maternal Grandmother    • Heart disease Maternal Grandfather    • Heart disease Paternal Grandmother    • Heart disease Paternal Grandfather      Social History     Socioeconomic History   • Marital status:      Spouse name: Becca   • Number of children: 2   • Years of education: 12   • Highest education level: Not on file   Social Needs   • Financial resource strain: Somewhat hard   • Food insecurity - worry: Sometimes true   • Food insecurity - inability: Sometimes true   • Transportation needs - medical: No   • Transportation needs - non-medical: No   Occupational History   • Occupation: DISABLED   Tobacco Use   • Smoking status: Current Every Day Smoker     Packs/day: 1.00     Years: 7.00     Pack years: 7.00     Types: Cigars, Cigarettes     Start date: 5/5/2010   • Smokeless tobacco: Never Used   Substance and Sexual Activity   • Alcohol use: No   •  Drug use: No   • Sexual activity: Defer   Other Topics Concern   • Not on file   Social History Narrative   • Not on file      Current Outpatient Medications on File Prior to Visit   Medication Sig Dispense Refill   • albuterol sulfate  (90 Base) MCG/ACT inhaler Inhale 2 puffs Every 4 (Four) Hours As Needed for Shortness of Air. 1 inhaler 5   • ASPIR-LOW 81 MG EC tablet TAKE 1 TABLET BY MOUTH DAILY FOR HEART HEALTH AND CIRCULATION 30 tablet 0   • azithromycin (ZITHROMAX) 250 MG tablet Take 2 tablets the first day, then 1 tablet daily for 4 days. 6 tablet 0   • budesonide-formoterol (SYMBICORT) 160-4.5 MCG/ACT inhaler Inhale 2 puffs 2 (Two) Times a Day. 1 inhaler 12   • cetirizine (zyrTEC) 10 MG tablet Take 1 tablet by mouth Daily. 30 tablet 5   • clonazePAM (KlonoPIN) 0.5 MG tablet Take 1 tablet by mouth Daily As Needed for Anxiety. 10 tablet 0   • cyclobenzaprine (FLEXERIL) 10 MG tablet Take 1 tablet by mouth 2 (Two) Times a Day As Needed for Muscle Spasms. 60 tablet 5   • doxepin (SINEquan) 10 MG capsule 1 capsule 2-3 hours before bedtime for insomnia 30 capsule 0   • doxycycline (MONODOX) 100 MG capsule Take 1 capsule by mouth Every 12 (Twelve) Hours. 20 capsule 0   • famotidine (PEPCID) 40 MG tablet Take 1 tablet by mouth Daily. 30 tablet 5   • HYDROcodone-acetaminophen (NORCO)  MG per tablet Take 1 tablet by mouth 2 (Two) Times a Day As Needed for Moderate Pain . 60 tablet 0   • lamoTRIgine (LaMICtal) 25 MG tablet Take 1 tablet by mouth Daily. 45 tablet 0   • linaclotide (LINZESS) 145 MCG capsule capsule 1 every morning before breakfast 30 capsule 0   • lisinopril (PRINIVIL,ZESTRIL) 20 MG tablet Take 1 tablet by mouth Daily. FOR BLOOD PRESSURE 30 tablet 5   • meclizine (ANTIVERT) 12.5 MG tablet Take 1 tablet by mouth 3 (Three) Times a Day As Needed for dizziness. 30 tablet 0   • montelukast (SINGULAIR) 10 MG tablet Take 1 tablet by mouth Every Night. 30 tablet 5   • mupirocin (BACTROBAN) 2 %  "ointment Apply  topically to the appropriate area as directed 3 (Three) Times a Day. 30 g 0   • pantoprazole (PROTONIX) 40 MG EC tablet TAKE ONE TABLET BY MOUTH DAILY FOR THE STOMACH 30 tablet 5   • phentermine (ADIPEX-P) 37.5 MG tablet Take 1 tablet by mouth Every Morning Before Breakfast. 30 tablet 0   • phenytoin (DILANTIN) 100 MG ER capsule Take 300 mg by mouth Every Evening. Prior to [facility] Admission, Patient was on: Prior to [facility] Admission, Patient was on: Uses brand name, takes 2 caps in the morning & 3 caps in the evening     • phenytoin (DILANTIN) 100 MG ER capsule Take 2 capsules by mouth Every Morning. Uses brand name, takes 2 caps in the morning & 3 caps in the evening 150 capsule 3   • potassium chloride (K-DUR) 10 MEQ CR tablet TAKE 1 TABLET BY MOUTH DAILY 30 tablet 5   • pravastatin (PRAVACHOL) 80 MG tablet Take 1 tablet by mouth every night at bedtime. 30 tablet 6   • SYMBICORT 160-4.5 MCG/ACT inhaler INHALE 2 PUFFS BY MOUTH INTO THE LUNGS 2 TIMES A DAY FOR SHORTNESS OF BREATH 10.2 g 5   • traMADol (ULTRAM) 50 MG tablet Take 1 tablet by mouth Every 6 (Six) Hours As Needed for Moderate Pain . 15 tablet 0     No current facility-administered medications on file prior to visit.       Allergies   Allergen Reactions   • Ciprofloxacin Anaphylaxis and Hives   • Paxil [Paroxetine Hcl] Shortness Of Breath     Chest pain    • Peanut-Containing Drug Products Anaphylaxis   • Penicillins Anaphylaxis   • Sulfa Antibiotics Anaphylaxis, Itching and Rash   • Isosorbide Nitrate Rash     Rash, hives, had to use inhaler.    • Doxycycline Hives   • Fish-Derived Products Hives   • Mobic [Meloxicam] Other (See Comments)     Pt states, \"It make my feet and hands go numb and I can't hardly walk.\"    • Pristiq [Desvenlafaxine Succinate Er] Dizziness   • Robitussin Cough+ Chest Max St [Dextromethorphan-Guaifenesin] Unknown (See Comments)     Pt states, \"I don't remember its been so long.\"    • Zoloft [Sertraline Hcl] Hives " "and Itching   • Contrast Dye Itching and Rash   • Diltiazem Rash   • Gabapentin Rash   • Keflex [Cephalexin] Rash   • Metoprolol Rash   • Prednisone Rash and Other (See Comments)     Face, feet, and legs go completely numb per patient   • Shrimp (Diagnostic) Rash   • Spironolactone Rash   • Viibryd [Vilazodone Hcl] Itching and Rash        The following portions of the patient's history were reviewed and updated as appropriate: allergies, current medications, past family history, past medical history, past social history, past surgical history and problem list.    Review of Systems   Constitutional: Positive for chills.   HENT: Positive for sinus pressure, sneezing, sore throat and tinnitus.    Eyes: Positive for pain and itching.   Respiratory: Positive for cough, chest tightness, shortness of breath and wheezing.    Cardiovascular: Positive for leg swelling.   Gastrointestinal: Negative.    Endocrine: Positive for cold intolerance.   Genitourinary: Negative.    Musculoskeletal: Positive for back pain, joint swelling and neck pain.   Skin: Negative.    Allergic/Immunologic: Positive for food allergies.   Neurological: Positive for dizziness, seizures, weakness, light-headedness, numbness and headaches.   Hematological: Bruises/bleeds easily.        Blood Clots   Psychiatric/Behavioral: Positive for sleep disturbance. The patient is nervous/anxious.         Objective      Physical Exam  Pulse 91   Ht 166 cm (65.35\")   Wt 93.7 kg (206 lb 9.1 oz)   SpO2 99%   BMI 34.00 kg/m²     Body mass index is 34 kg/m².        GENERAL APPEARANCE: awake, alert & oriented x 3, in no acute distress and well developed, well nourished  PSYCH: normal mood and affect  LUNGS:  breathing nonlabored, no wheezing  EYES: sclera anicteric, pupils equal  CARDIOVASCULAR: palpable pulses dorsalis pedis, palpable posterior tibial bilaterally. Capillary refill less than 2 seconds  INTEGUMENTARY: skin intact, no clubbing, cyanosis  NEUROLOGIC:  " Normal Sensation and reflexes             Ortho Exam  Peripheral Vascular:    Upper Extremity:   Inspection:  Left--no cyanotic nail beds Right--no cyanotic nail beds   Bilateral:  Pink nail beds with brisk capillary refill   Palpation:  Bilateral radial pulse normal    Musculoskeletal:  Global Assessment:  Overall assessment of Lower Extremity Muscle Strength and Tone:  Left quadriceps--5/5   Left hamstrings--5/5       Left tibialis anterior--5/5  Left gastroc-soleus--5/5  Left EHL --5/5    Lower Extremity:  Knee/Patella:  No digital clubbing or cyanosis.    Examination of left knee reveals:  Normal deep tendon reflexes, coordination, strength, tone, sensation.  No known fractures or deformities.    Inspection and Palpation:  Left knee:  Tenderness:  Over the medial joint line and moderate severity  Effusion:  none  Crepitus:  Positive  Pulses:  2+  Ecchymosis:  None  Warmth:  None     ROM:  Right:  Extension:5    Flexion:120  Left:  Extension:5     Flexion:120    Instability:    Left:  Lachman Test:  Negative, Varus stress test negative, Valgus stress test negative    Deformities/Malalignments/Discrepancies:    Left:  Genu Varum   Right:  No deformity    Functional Testing:  Tom's test:  Negative  Patella grind test:  Positive  Q-angle:  normal    Imaging/Studies  Imaging Results (last 7 days)     Procedure Component Value Units Date/Time    XR Knee 4+ View Left [[id number]] Resulted:  01/23/19 1339     Updated:  01/23/19 1339    Narrative:       Knee X-Ray  Indication: Pain    Upright AP of bilateral knees. Lateral, skiers and Sunrise views of left   knee     Findings:  No fracture  No bony lesion  Normal soft tissues  Normal joint spaces    No prior studies were available for comparison.              Assessment/Plan        ICD-10-CM ICD-9-CM   1. Left knee pain, unspecified chronicity M25.562 719.46   2. Chondromalacia M94.20 733.92   3. Opioid dependence with current use (CMS/[facility]) F11.20 304.00       Orders  Placed This Encounter   Procedures   • XR Knee 4+ View Left   • MRI Knee Left Without Contrast   • Ambulatory Referral to Physical Therapy    He has a complicated knee history with prior surgery and persistent symptoms.  He is also on hydrocodone chronically.  I have ordered an MRI because of his failure follow-up management including surgery, prior physical therapy, injections and anti-inflammatories.    Medical Decision Making  Management Options : over-the-counter medicine  Data/Risk: radiology tests and independent visualization of imaging, lab tests, or EMG/NCV    Arun Causey MD  01/23/19  1:47 PM         EMR Dragon/Transcription disclaimer:  Much of this encounter note is an electronic transcription of spoken language to printed text. Electronic transcription of spoken language may permit erroneous, or at times, nonsensical words or phrases to be inadvertently transcribed. Although I have reviewed the note for such errors, some may still exist.

## 2019-01-28 ENCOUNTER — OFFICE VISIT (OUTPATIENT)
Dept: NEUROLOGY | Facility: CLINIC | Age: 49
End: 2019-01-28

## 2019-01-28 ENCOUNTER — LAB (OUTPATIENT)
Dept: LAB | Facility: HOSPITAL | Age: 49
End: 2019-01-28

## 2019-01-28 VITALS
HEIGHT: 66 IN | WEIGHT: 206 LBS | RESPIRATION RATE: 18 BRPM | OXYGEN SATURATION: 99 % | HEART RATE: 88 BPM | BODY MASS INDEX: 33.11 KG/M2 | SYSTOLIC BLOOD PRESSURE: 108 MMHG | DIASTOLIC BLOOD PRESSURE: 72 MMHG

## 2019-01-28 DIAGNOSIS — G40.909 SEIZURE DISORDER (HCC): ICD-10-CM

## 2019-01-28 DIAGNOSIS — G40.909 SEIZURE DISORDER (HCC): Primary | ICD-10-CM

## 2019-01-28 LAB — PHENYTOIN SERPL-MCNC: 16 MCG/ML (ref 10–20)

## 2019-01-28 PROCEDURE — 99204 OFFICE O/P NEW MOD 45 MIN: CPT | Performed by: PSYCHIATRY & NEUROLOGY

## 2019-01-28 PROCEDURE — 36415 COLL VENOUS BLD VENIPUNCTURE: CPT

## 2019-01-28 PROCEDURE — 80185 ASSAY OF PHENYTOIN TOTAL: CPT

## 2019-01-28 NOTE — PROGRESS NOTES
"Subjective   Patient ID: Jaquan Mckay is a 48 y.o. male     Chief Complaint   Patient presents with   • Seizures        History of Present Illness    48 y.o. male referred by Tiera Valenzuela for seizures.  Developed sz at 4 - 7 years old.  Sz were frequent until removal brain tumor in 1986.      Aura of tingling in head, weakness and dizziness.  Then passes out.      Last aura were over 6 months ago during a divorce.      Compliant with PHT.      Previous AED:  CBZ, phenobarb, VPA, Keppra, Lamictal       Reviewed medical records:    Pt admitted to hospital 9/25/18 - 9/28/18 for non cardiac CP.  PMH of TBI and Sz disorder.  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  9/26/18 PHT level free 1.2   9/6/18 prolactin 21.2     Past Medical History:   Diagnosis Date   • Allergic    • Anxiety    • Arthritis    • Asthma    • Body piercing     REPORTS CYLICONE IN EARS   • Clotting disorder (CMS/HCC) 2004    had a knee surgery   • Depression    • DVT (deep venous thrombosis) (CMS/HCC)     RIGHT RIGHT KNEE AFTER SURGERY YEARS AGO IN 2001 OR 2004   • Gastric ulcer    • GERD (gastroesophageal reflux disease)    • H/O migraine    • H/O sleep apnea     REPORTS DIAGNOSED WITH SLEEP APNEA AND COULDN'T STAND TO WEAR THE MACHINE   • Headache    • Heart attack (CMS/HCC)     REPORTS \"LIGHT HEART ATTACK A LONG TIME AGO\"  \"EARLY 90'S\"   • History of seizures     REPORTS LAST EPISODE WAS AROUND 1995.   • Hostility    • Hyperlipidemia    • Hypertension    • Knee pain, acute     Left   • Low back pain    • Migraine    • MRSA (methicillin resistant Staphylococcus aureus)     REPORTS LAST TESTED + 2004. WAS TREATED HE REPORTS.  RIGHT ARM, RIGHT KNEE.   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    • Obesity    • Poor historian    • Carl Mountain spotted fever    • Seizures (CMS/HCC)    • Tattoo    • Wears glasses      Family History   Problem Relation Age of Onset   • Diabetes Mother    • " Hypertension Mother    • Stroke Mother    • Diabetes Father    • Skin cancer Father    • Hypertension Father    • Heart attack Father    • Diabetes Brother    • Hypertension Brother    • Heart disease Maternal Aunt    • Heart disease Maternal Uncle    • Heart disease Paternal Aunt    • Heart disease Paternal Uncle    • Heart disease Maternal Grandmother    • Heart disease Maternal Grandfather    • Heart disease Paternal Grandmother    • Heart disease Paternal Grandfather      Social History     Socioeconomic History   • Marital status:      Spouse name: Becca   • Number of children: 2   • Years of education: 12   • Highest education level: Not on file   Social Needs   • Financial resource strain: Somewhat hard   • Food insecurity - worry: Sometimes true   • Food insecurity - inability: Sometimes true   • Transportation needs - medical: No   • Transportation needs - non-medical: No   Occupational History   • Occupation: DISABLED   Tobacco Use   • Smoking status: Current Every Day Smoker     Packs/day: 1.00     Years: 7.00     Pack years: 7.00     Types: Cigars, Cigarettes     Start date: 5/5/2010   • Smokeless tobacco: Never Used   Substance and Sexual Activity   • Alcohol use: No   • Drug use: No   • Sexual activity: Defer       Review of Systems   Constitutional: Negative for activity change, fatigue and unexpected weight change.   HENT: Negative for facial swelling, hearing loss, tinnitus, trouble swallowing and voice change.    Eyes: Negative for photophobia, pain and visual disturbance.   Respiratory: Negative for apnea, cough and choking.    Cardiovascular: Negative for chest pain.   Gastrointestinal: Negative for constipation, nausea and vomiting.   Endocrine: Negative for cold intolerance.   Genitourinary: Negative for difficulty urinating, frequency and urgency.   Musculoskeletal: Negative for arthralgias, back pain, gait problem, myalgias, neck pain and neck stiffness.   Skin: Negative for rash.  "  Allergic/Immunologic: Negative for immunocompromised state.   Neurological: Positive for seizures. Negative for dizziness, tremors, syncope, facial asymmetry, speech difficulty, weakness, light-headedness, numbness and headaches.   Hematological: Negative for adenopathy.   Psychiatric/Behavioral: Positive for decreased concentration. Negative for confusion, hallucinations and sleep disturbance. The patient is nervous/anxious.        Objective     Vitals:    01/28/19 1347   BP: 108/72   BP Location: Left arm   Patient Position: Sitting   Cuff Size: Adult   Pulse: 88   Resp: 18   SpO2: 99%   Weight: 93.4 kg (206 lb)   Height: 167.6 cm (66\")       Neurologic Exam     Mental Status   Oriented to person, place, and time.   Registration: recalls 3 of 3 objects. Recall at 5 minutes: recalls 3 of 3 objects. Follows 3 step commands.   Attention: normal. Concentration: normal.   Speech: speech is normal   Level of consciousness: alert  Knowledge: good and consistent with education.   Able to name object. Able to read. Able to repeat. Able to write. Normal comprehension.     Cranial Nerves     CN II   Visual fields full to confrontation.   Visual acuity: normal  Right visual field deficit: none  Left visual field deficit: none     CN III, IV, VI   Pupils are equal, round, and reactive to light.  Extraocular motions are normal.   Right pupil: Shape: regular. Reactivity: brisk. Consensual response: intact.   Left pupil: Shape: regular. Reactivity: brisk. Consensual response: intact.   Nystagmus: none   Diplopia: none  Ophthalmoparesis: none  Upgaze: normal  Downgaze: normal  Conjugate gaze: present  Vestibulo-ocular reflex: present    CN V   Facial sensation intact.   Right corneal reflex: normal  Left corneal reflex: normal    CN VII   Right facial weakness: none  Left facial weakness: none    CN VIII   Hearing: intact    CN IX, X   Palate: symmetric  Right gag reflex: normal  Left gag reflex: normal    CN XI   Right " sternocleidomastoid strength: normal  Left sternocleidomastoid strength: normal    CN XII   Tongue: not atrophic  Fasciculations: absent  Tongue deviation: none    Motor Exam   Muscle bulk: normal  Overall muscle tone: normal  Right arm tone: normal  Left arm tone: normal  Right leg tone: normal  Left leg tone: normal    Strength   Strength 5/5 throughout.     Sensory Exam   Light touch normal.   Vibration normal.   Proprioception normal.   Pinprick normal.     Gait, Coordination, and Reflexes     Gait  Gait: normal    Coordination   Romberg: negative  Finger to nose coordination: normal  Heel to shin coordination: normal  Tandem walking coordination: normal    Tremor   Resting tremor: absent  Intention tremor: absent  Action tremor: absent    Reflexes   Reflexes 2+ except as noted.       Physical Exam   Constitutional: He is oriented to person, place, and time. Vital signs are normal. He appears well-developed and well-nourished.   HENT:   Head: Normocephalic and atraumatic.   Eyes: EOM and lids are normal. Pupils are equal, round, and reactive to light.   Fundoscopic exam:       The right eye shows no exudate, no hemorrhage and no papilledema. The right eye shows venous pulsations.        The left eye shows no exudate, no hemorrhage and no papilledema. The left eye shows venous pulsations.   Neck: Normal range of motion and phonation normal. Normal carotid pulses present. Carotid bruit is not present. No thyroid mass and no thyromegaly present.   Cardiovascular: Normal rate, regular rhythm and normal heart sounds.   Pulmonary/Chest: Effort normal.   Neurological: He is oriented to person, place, and time. He has normal strength. He has a normal Finger-Nose-Finger Test, a normal Heel to Shin Test, a normal Romberg Test and a normal Tandem Gait Test. Gait normal.   Skin: Skin is warm and dry.   Psychiatric: He has a normal mood and affect. His speech is normal.   Nursing note and vitals reviewed.      Admission on  01/03/2019, Discharged on 01/03/2019   Component Date Value Ref Range Status   • Glucose 01/03/2019 109  70 - 110 mg/dL Final   • BUN 01/03/2019 16  7 - 21 mg/dL Final   • Creatinine 01/03/2019 0.99  0.43 - 1.29 mg/dL Final   • Sodium 01/03/2019 137  135 - 153 mmol/L Final   • Potassium 01/03/2019 3.7  3.5 - 5.3 mmol/L Final   • Chloride 01/03/2019 103  99 - 112 mmol/L Final   • CO2 01/03/2019 28.4  24.3 - 31.9 mmol/L Final   • Calcium 01/03/2019 8.9  7.7 - 10.0 mg/dL Final   • Total Protein 01/03/2019 7.2  6.0 - 8.0 g/dL Final   • Albumin 01/03/2019 4.00  3.50 - 5.00 g/dL Final   • ALT (SGPT) 01/03/2019 30  10 - 44 U/L Final   • AST (SGOT) 01/03/2019 30  10 - 34 U/L Final   • Alkaline Phosphatase 01/03/2019 84  40 - 129 U/L Final    Note New Reference Ranges   • Total Bilirubin 01/03/2019 0.2  0.2 - 1.8 mg/dL Final   • eGFR Non African Amer 01/03/2019 81  >60 mL/min/1.73 Final   • Globulin 01/03/2019 3.2  gm/dL Final   • A/G Ratio 01/03/2019 1.3* 1.5 - 2.5 g/dL Final   • BUN/Creatinine Ratio 01/03/2019 16.2  7.0 - 25.0 Final   • Anion Gap 01/03/2019 5.6  3.6 - 11.2 mmol/L Final   • Troponin I 01/03/2019 <0.006  <=0.040 ng/mL Final   • Influenza A Ag, EIA 01/03/2019 Negative  Negative Final   • Influenza B Ag, EIA 01/03/2019 Negative  Negative Final   • WBC 01/03/2019 7.57  4.50 - 12.50 10*3/mm3 Final   • RBC 01/03/2019 4.60* 4.70 - 6.10 10*6/mm3 Final   • Hemoglobin 01/03/2019 15.0  14.0 - 18.0 g/dL Final   • Hematocrit 01/03/2019 43.2  42.0 - 52.0 % Final   • MCV 01/03/2019 93.9  80.0 - 94.0 fL Final   • MCH 01/03/2019 32.6  27.0 - 33.0 pg Final   • MCHC 01/03/2019 34.7  33.0 - 37.0 g/dL Final   • RDW 01/03/2019 12.9  11.5 - 14.5 % Final   • RDW-SD 01/03/2019 43.0  37.0 - 54.0 fl Final   • MPV 01/03/2019 9.6  6.0 - 10.0 fL Final   • Platelets 01/03/2019 186  130 - 400 10*3/mm3 Final   • Neutrophil % 01/03/2019 54.6  30.0 - 70.0 % Final   • Lymphocyte % 01/03/2019 28.9  21.0 - 51.0 % Final   • Monocyte % 01/03/2019  14.1* 0.0 - 10.0 % Final   • Eosinophil % 01/03/2019 2.0  0.0 - 5.0 % Final   • Basophil % 01/03/2019 0.3  0.0 - 2.0 % Final   • Immature Grans % 01/03/2019 0.1  0.0 - 0.5 % Final   • Neutrophils, Absolute 01/03/2019 4.13  1.40 - 6.50 10*3/mm3 Final   • Lymphocytes, Absolute 01/03/2019 2.19  1.00 - 3.00 10*3/mm3 Final   • Monocytes, Absolute 01/03/2019 1.07* 0.10 - 0.90 10*3/mm3 Final   • Eosinophils, Absolute 01/03/2019 0.15  0.00 - 0.70 10*3/mm3 Final   • Basophils, Absolute 01/03/2019 0.02  0.00 - 0.30 10*3/mm3 Final   • Immature Grans, Absolute 01/03/2019 0.01  0.00 - 0.03 10*3/mm3 Final   • Osmolality Calc 01/03/2019 275.6  273.0 - 305.0 mOsm/kg Final         Assessment/Plan     Problem List Items Addressed This Visit        Nervous and Auditory    Seizure disorder (CMS/HCC) - Primary    Current Assessment & Plan     Sz disorder stable on PHT    Continue  mg qam and 300 mg qhs         Relevant Medications    phenytoin (DILANTIN) 100 MG ER capsule    lamoTRIgine (LaMICtal) 25 MG tablet    clonazePAM (KlonoPIN) 0.5 MG tablet    phenytoin (DILANTIN) 100 MG ER capsule             No Follow-up on file.

## 2019-01-29 ENCOUNTER — OFFICE VISIT (OUTPATIENT)
Dept: CARDIOLOGY | Facility: CLINIC | Age: 49
End: 2019-01-29

## 2019-01-29 VITALS
DIASTOLIC BLOOD PRESSURE: 82 MMHG | BODY MASS INDEX: 34.39 KG/M2 | HEART RATE: 75 BPM | SYSTOLIC BLOOD PRESSURE: 127 MMHG | HEIGHT: 66 IN | RESPIRATION RATE: 16 BRPM | WEIGHT: 214 LBS

## 2019-01-29 DIAGNOSIS — I10 ESSENTIAL HYPERTENSION: Primary | ICD-10-CM

## 2019-01-29 DIAGNOSIS — E78.2 MIXED HYPERLIPIDEMIA: ICD-10-CM

## 2019-01-29 DIAGNOSIS — R07.2 PRECORDIAL PAIN: ICD-10-CM

## 2019-01-29 DIAGNOSIS — I25.10 ASCVD (ARTERIOSCLEROTIC CARDIOVASCULAR DISEASE): ICD-10-CM

## 2019-01-29 PROCEDURE — 99213 OFFICE O/P EST LOW 20 MIN: CPT | Performed by: NURSE PRACTITIONER

## 2019-01-29 NOTE — PROGRESS NOTES
"Tiera Valenzuela APRN  Jaquan Mckay  1970 01/29/2019    Patient Active Problem List   Diagnosis   • Gastroesophageal reflux disease without esophagitis   • Arthritis   • Hypertension   • Seizure disorder (CMS/HCC)   • Hyperlipidemia   • Anxiety   • Varicocele   • Varicocele present on ultrasound of scrotum   • Obesity (BMI 30.0-34.9)   • Chronic bilateral low back pain with left-sided sciatica   • Seasonal allergic rhinitis due to pollen   • Mild persistent asthma without complication   • Precordial pain   • Dysuria   • Congenital coronary artery anomaly   • BMI 35.0-35.9,adult   • Chondromalacia, knee   • Achilles tendinitis of both lower extremities   • Muscle spasm of both lower legs   • Personal history of allergy to shellfish   • Sensation of cold in lower extremity   • Varicose vein of leg   • Heart palpitations   • Shortness of breath   • Generalized anxiety disorder   • Chronic elbow pain, right   • Chest pain   • Unstable angina (CMS/[facility])       DeaTiera Hernandez APRN:    Subjective     Chief Complaint   Patient presents with   • Hypertension   • Chest Pain           History of Present Illness:    Jaquan Mckay is a 48 y.o. male with a history of hypertension, hyperlipidemia, and chronic chest pains.  He presents today for routine cardiology follow-up.  At his last visit he was having active chest pains with an abnormal EKG.  He was referred to the hospital and admitted for further workup.  Ultimately, he underwent cardiac catheterization.  This revealed very mild atherosclerotic disease.  His chest pains were considered noncardiac and he was ultimately discharged.  He reports he continues to have intermittent chest pains.  This occurs when he \"stops breathing\" at night.  He states his PCP has referred him to sleep medicine for an overnight sleep study.  He denies any further palpitations.  His chronic shortness of breath with a history of asthma.  His blood pressure is well controlled in the " "office today.          Allergies   Allergen Reactions   • Ciprofloxacin Anaphylaxis and Hives   • Paxil [Paroxetine Hcl] Shortness Of Breath     Chest pain    • Peanut-Containing Drug Products Anaphylaxis   • Penicillins Anaphylaxis   • Sulfa Antibiotics Anaphylaxis, Itching and Rash   • Isosorbide Nitrate Rash     Rash, hives, had to use inhaler.    • Doxycycline Hives   • Fish-Derived Products Hives   • Mobic [Meloxicam] Other (See Comments)     Pt states, \"It make my feet and hands go numb and I can't hardly walk.\"    • Pristiq [Desvenlafaxine Succinate Er] Dizziness   • Robitussin Cough+ Chest Max St [Dextromethorphan-Guaifenesin] Unknown (See Comments)     Pt states, \"I don't remember its been so long.\"    • Zoloft [Sertraline Hcl] Hives and Itching   • Contrast Dye Itching and Rash   • Diltiazem Rash   • Gabapentin Rash   • Keflex [Cephalexin] Rash   • Metoprolol Rash   • Prednisone Rash and Other (See Comments)     Face, feet, and legs go completely numb per patient   • Shrimp (Diagnostic) Rash   • Spironolactone Rash   • Viibryd [Vilazodone Hcl] Itching and Rash   :      Current Outpatient Medications:   •  albuterol sulfate  (90 Base) MCG/ACT inhaler, Inhale 2 puffs Every 4 (Four) Hours As Needed for Shortness of Air., Disp: 1 inhaler, Rfl: 5  •  ASPIR-LOW 81 MG EC tablet, TAKE 1 TABLET BY MOUTH DAILY FOR HEART HEALTH AND CIRCULATION, Disp: 30 tablet, Rfl: 0  •  budesonide-formoterol (SYMBICORT) 160-4.5 MCG/ACT inhaler, Inhale 2 puffs 2 (Two) Times a Day., Disp: 1 inhaler, Rfl: 12  •  cetirizine (zyrTEC) 10 MG tablet, Take 1 tablet by mouth Daily., Disp: 30 tablet, Rfl: 5  •  clonazePAM (KlonoPIN) 0.5 MG tablet, Take 1 tablet by mouth Daily As Needed for Anxiety., Disp: 10 tablet, Rfl: 0  •  cyclobenzaprine (FLEXERIL) 10 MG tablet, Take 1 tablet by mouth 2 (Two) Times a Day As Needed for Muscle Spasms., Disp: 60 tablet, Rfl: 5  •  doxepin (SINEquan) 10 MG capsule, 1 capsule 2-3 hours before bedtime " for insomnia, Disp: 30 capsule, Rfl: 0  •  famotidine (PEPCID) 40 MG tablet, Take 1 tablet by mouth Daily., Disp: 30 tablet, Rfl: 5  •  HYDROcodone-acetaminophen (NORCO)  MG per tablet, Take 1 tablet by mouth 2 (Two) Times a Day As Needed for Moderate Pain ., Disp: 60 tablet, Rfl: 0  •  montelukast (SINGULAIR) 10 MG tablet, Take 1 tablet by mouth Every Night., Disp: 30 tablet, Rfl: 5  •  pantoprazole (PROTONIX) 40 MG EC tablet, TAKE ONE TABLET BY MOUTH DAILY FOR THE STOMACH, Disp: 30 tablet, Rfl: 5  •  phentermine (ADIPEX-P) 37.5 MG tablet, Take 1 tablet by mouth Every Morning Before Breakfast., Disp: 30 tablet, Rfl: 0  •  phenytoin (DILANTIN) 100 MG ER capsule, Take 2 capsules by mouth Every Morning. Uses brand name, takes 2 caps in the morning & 3 caps in the evening, Disp: 150 capsule, Rfl: 3  •  potassium chloride (K-DUR) 10 MEQ CR tablet, TAKE 1 TABLET BY MOUTH DAILY, Disp: 30 tablet, Rfl: 5  •  pravastatin (PRAVACHOL) 80 MG tablet, Take 1 tablet by mouth every night at bedtime., Disp: 30 tablet, Rfl: 6  •  SYMBICORT 160-4.5 MCG/ACT inhaler, INHALE 2 PUFFS BY MOUTH INTO THE LUNGS 2 TIMES A DAY FOR SHORTNESS OF BREATH, Disp: 10.2 g, Rfl: 5  •  doxycycline (MONODOX) 100 MG capsule, Take 1 capsule by mouth Every 12 (Twelve) Hours., Disp: 20 capsule, Rfl: 0  •  linaclotide (LINZESS) 145 MCG capsule capsule, 1 every morning before breakfast, Disp: 30 capsule, Rfl: 0  •  lisinopril (PRINIVIL,ZESTRIL) 20 MG tablet, Take 1 tablet by mouth Daily. FOR BLOOD PRESSURE, Disp: 30 tablet, Rfl: 5  •  meclizine (ANTIVERT) 12.5 MG tablet, Take 1 tablet by mouth 3 (Three) Times a Day As Needed for dizziness., Disp: 30 tablet, Rfl: 0  •  mupirocin (BACTROBAN) 2 % ointment, Apply  topically to the appropriate area as directed 3 (Three) Times a Day., Disp: 30 g, Rfl: 0  •  phenytoin (DILANTIN) 100 MG ER capsule, Take 300 mg by mouth Every Evening. Prior to [facility] Admission, Patient was on: Prior to [facility] Admission, Patient  "was on: Uses brand name, takes 2 caps in the morning & 3 caps in the evening, Disp: , Rfl:       The following portions of the patient's history were reviewed and updated as appropriate: allergies, current medications, past family history, past medical history, past social history, past surgical history and problem list.    Social History     Tobacco Use   • Smoking status: Current Every Day Smoker     Packs/day: 1.00     Years: 7.00     Pack years: 7.00     Types: Cigars, Cigarettes     Start date: 5/5/2010   • Smokeless tobacco: Never Used   Substance Use Topics   • Alcohol use: No   • Drug use: No       Review of Systems   Constitution: Negative for weakness and malaise/fatigue.   Cardiovascular: Positive for chest pain, leg swelling and palpitations. Negative for syncope.   Respiratory: Positive for shortness of breath. Negative for cough and wheezing.    Neurological: Positive for dizziness. Negative for light-headedness.       Objective   Vitals:    01/29/19 0953   BP: 127/82   BP Location: Right arm   Pulse: 75   Resp: 16   Weight: 97.1 kg (214 lb)   Height: 167.6 cm (65.98\")     Body mass index is 34.56 kg/m².        Physical Exam   Constitutional: He is oriented to person, place, and time. He appears well-developed and well-nourished.   HENT:   Head: Normocephalic and atraumatic.   Cardiovascular: Normal rate, regular rhythm and normal heart sounds. Exam reveals no S3 and no S4.   No murmur heard.  Pulmonary/Chest: Effort normal and breath sounds normal. He has no wheezes. He has no rales.   Abdominal: Soft. Bowel sounds are normal.   Musculoskeletal: He exhibits no edema.   Neurological: He is alert and oriented to person, place, and time.   Skin: Skin is warm and dry.   Psychiatric: He has a normal mood and affect. His behavior is normal.       Lab Results   Component Value Date     01/03/2019    K 3.7 01/03/2019     01/03/2019    CO2 28.4 01/03/2019    BUN 16 01/03/2019    CREATININE 0.99 " 01/03/2019    GLUCOSE 109 01/03/2019    CALCIUM 8.9 01/03/2019    AST 30 01/03/2019    ALT 30 01/03/2019    ALKPHOS 84 01/03/2019    LABIL2 1.1 (L) 05/29/2016     Lab Results   Component Value Date    CKTOTAL 171 07/25/2018     Lab Results   Component Value Date    WBC 7.57 01/03/2019    HGB 15.0 01/03/2019    HCT 43.2 01/03/2019     01/03/2019       Procedures      Assessment/Plan    Diagnosis Plan   1. Essential hypertension     2. Mixed hyperlipidemia     3. Precordial pain                  Recommendations:    1. I have discussed findings of the cardiac catheterization with the patient at length.    2. I have encouraged him to continue follow up with his PCP for evaluation of sleep apnea and possible non cardiac causes of his chest pains    3. Continue low dose aspirin, pravastatin, and lisinopril    4. Follow up in 6 months and PRN        Return in about 7 months (around 8/29/2019) for Recheck.    As always, I appreciate very much the opportunity to participate in the cardiovascular care of your patients.      With Best Regards,    BALDOMERO Horowitz

## 2019-02-06 ENCOUNTER — OFFICE VISIT (OUTPATIENT)
Dept: FAMILY MEDICINE CLINIC | Facility: CLINIC | Age: 49
End: 2019-02-06

## 2019-02-06 VITALS
WEIGHT: 212 LBS | OXYGEN SATURATION: 97 % | SYSTOLIC BLOOD PRESSURE: 128 MMHG | BODY MASS INDEX: 34.07 KG/M2 | HEIGHT: 66 IN | TEMPERATURE: 98.8 F | HEART RATE: 101 BPM | DIASTOLIC BLOOD PRESSURE: 82 MMHG

## 2019-02-06 DIAGNOSIS — R79.89 ELEVATED PROLACTIN LEVEL: ICD-10-CM

## 2019-02-06 DIAGNOSIS — R20.9 SENSATION OF COLD IN LOWER EXTREMITY: Primary | ICD-10-CM

## 2019-02-06 DIAGNOSIS — G89.29 CHRONIC BILATERAL LOW BACK PAIN WITH LEFT-SIDED SCIATICA: ICD-10-CM

## 2019-02-06 DIAGNOSIS — M54.42 CHRONIC BILATERAL LOW BACK PAIN WITH LEFT-SIDED SCIATICA: ICD-10-CM

## 2019-02-06 DIAGNOSIS — E66.9 OBESITY (BMI 30.0-34.9): ICD-10-CM

## 2019-02-06 DIAGNOSIS — M94.262 CHONDROMALACIA OF LEFT KNEE: ICD-10-CM

## 2019-02-06 PROCEDURE — 99214 OFFICE O/P EST MOD 30 MIN: CPT | Performed by: NURSE PRACTITIONER

## 2019-02-06 RX ORDER — HYDROCODONE BITARTRATE AND ACETAMINOPHEN 10; 325 MG/1; MG/1
1 TABLET ORAL 2 TIMES DAILY PRN
Qty: 60 TABLET | Refills: 0 | Status: SHIPPED | OUTPATIENT
Start: 2019-02-06 | End: 2019-03-12 | Stop reason: SDUPTHER

## 2019-02-06 RX ORDER — PHENTERMINE HYDROCHLORIDE 37.5 MG/1
37.5 TABLET ORAL
Qty: 30 TABLET | Refills: 0 | Status: SHIPPED | OUTPATIENT
Start: 2019-02-06 | End: 2019-03-12 | Stop reason: SDUPTHER
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
## 2019-02-12 RX ORDER — ASPIRIN 81 MG/1
TABLET ORAL
Qty: 30 TABLET | Refills: 3 | Status: SHIPPED | OUTPATIENT
Start: 2019-02-12 | End: 2021-01-04 | Stop reason: SDUPTHER

## 2019-02-13 ENCOUNTER — HOSPITAL ENCOUNTER (OUTPATIENT)
Dept: PHYSICAL THERAPY | Facility: HOSPITAL | Age: 49
Setting detail: THERAPIES SERIES
Discharge: HOME | End: 2019-02-13

## 2019-02-13 DIAGNOSIS — M25.562 LEFT KNEE PAIN, UNSPECIFIED CHRONICITY: Primary | ICD-10-CM

## 2019-02-13 DIAGNOSIS — M94.20 CHONDROMALACIA: ICD-10-CM

## 2019-02-13 PROCEDURE — 97163 PT EVAL HIGH COMPLEX 45 MIN: CPT | Performed by: PHYSICAL THERAPIST

## 2019-02-13 NOTE — THERAPY EVALUATION
"    Outpatient Physical Therapy Ortho Initial Evaluation   [place]     Patient Name: Jaquan Mckay  : 1970  MRN: [id number]  Today's Date: 2019      Visit Date: 2019    Patient Active Problem List   Diagnosis   • Gastroesophageal reflux disease without esophagitis   • Arthritis   • Hypertension   • Seizure disorder (CMS/HCC)   • Hyperlipidemia   • Anxiety   • Varicocele   • Varicocele present on ultrasound of scrotum   • Obesity (BMI 30.0-34.9)   • Chronic bilateral low back pain with left-sided sciatica   • Seasonal allergic rhinitis due to pollen   • Mild persistent asthma without complication   • Precordial pain   • Dysuria   • Congenital coronary artery anomaly   • BMI 35.0-35.9,adult   • Chondromalacia, knee   • Achilles tendinitis of both lower extremities   • Muscle spasm of both lower legs   • Personal history of allergy to shellfish   • Sensation of cold in lower extremity   • Varicose vein of leg   • Heart palpitations   • Shortness of breath   • Generalized anxiety disorder   • Chronic elbow pain, right   • Chest pain   • Unstable angina (CMS/[facility])   • ASCVD (arteriosclerotic cardiovascular disease)        Past Medical History:   Diagnosis Date   • Allergic    • Anxiety    • Arthritis    • Asthma    • Body piercing     REPORTS CYLICONE IN EARS   • Clotting disorder (CMS/HCC)     had a knee surgery   • Depression    • DVT (deep venous thrombosis) (CMS/[facility])     RIGHT RIGHT KNEE AFTER SURGERY YEARS AGO IN  OR    • Gastric ulcer    • GERD (gastroesophageal reflux disease)    • H/O migraine    • H/O sleep apnea     REPORTS DIAGNOSED WITH SLEEP APNEA AND COULDN'T STAND TO WEAR THE MACHINE   • Headache    • Heart attack (CMS/[facility])     REPORTS \"LIGHT HEART ATTACK A LONG TIME AGO\"  \"EARLY \"   • History of seizures     REPORTS LAST EPISODE WAS AROUND .   • Hostility    • Hyperlipidemia    • Hypertension    • Knee pain, acute     Left   • Low back pain    • Migraine    • MRSA " (methicillin resistant Staphylococcus aureus)     REPORTS LAST TESTED + 2004. WAS TREATED HE REPORTS.  RIGHT ARM, RIGHT KNEE.   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    • Obesity    • Poor historian    • [place] spotted fever    • Seizures (CMS/HCC)    • Tattoo    • Wears glasses         Past Surgical History:   Procedure Laterality Date   • BACK SURGERY     • BRAIN SURGERY  1986    Tumor removal    • CARDIAC CATHETERIZATION N/A 9/28/2018    Procedure: Left Heart Cath;  Surgeon: Leandro Daily MD;  Location:  [facility];  Service: Cardiology   • CARDIAC SURGERY      CARDIAC CATH REPORTED AS 2 MONTHS AGO IN [place]. REPORTS NO STENTS PLACED.   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 Left knee pain, unspecified chronicity M25.562 719.46   2. Chondromalacia M94.20 733.92       Patient History     Row Name 02/13/19 1000             History    Chief Complaint  Pain;Difficulty Walking;Difficulty with daily activities  -BE      Type of Pain  Knee pain  -BE      Date Current Problem(s) Began  10/20/17  -BE      Brief Description of Current Complaint  Patient reports that he has been experiencing left knee pain since October 2017.  He notes that pain started when he had a knee surgery performed on the left knee.  Patient reports that he has noticed decreased movement in the knee; he also reports that he has increased pain when trying to move the knee.  Patient states that he has noticed increased swelling at the knee.  Patient reports that he notices his left knee gives out on him  frequently, causing him to fall; he reports 11 falls in the past year.  Patient reports that he was referred to physical therapy, noting an MRI will be ordered if necessary.  -BE      Patient/Caregiver Goals  Improve strength;Relieve pain;Improve mobility  -BE      Current Tobacco Use  Smoker  -BE      Smoking Status  Smoker  -BE      Patient's Rating of General Health  Good  -BE      Hand Dominance  right-handed  -BE      Occupation/sports/leisure activities  Disabled  -BE      How has patient tried to help current problem?  Medications, injections, Surgery  -BE      What clinical tests have you had for this problem?  X-ray  -BE      Results of Clinical Tests  Negative, per patient  -BE         Pain     Pain Location  Knee  -BE      Pain at Present  9  -BE      Pain at Best  9  -BE      Pain at Worst  10  -BE      Pain Frequency  Constant/continuous  -BE      Pain Description  Sharp;Stabbing;Throbbing  -BE      What Performance Factors Make the Current Problem(s) WORSE?  Stair climbing, standing, walking, sitting, squatting  -BE      What Performance Factors Make the Current Problem(s) BETTER?  rest, repositioning  -BE      Tolerance Time- Standing  1 hour  -BE      Tolerance Time- Sitting  1 hour  -BE      Tolerance Time- Walking  1 hour  -BE      Is your sleep disturbed?  Yes  -BE      Total hours of sleep per night  3 hours disturbed  -BE      Difficulties with ADL's?  Independent with ADLs, but notes difficulty with tasks.  -BE         Fall Risk Assessment    Any falls in the past year:  Yes  -BE      Number of falls reported in the last 12 months  11  -BE      Factors that contributed to the fall:  Other (comment) knee gave out  -BE         Services    Prior Rehab/Home Health Experiences  -- No PT in 2019; not seeing a chiropractor  -BE      Are you currently receiving Home Health services  No  -BE         Daily Activities    Primary Language  English  -BE      How does patient learn best?   Listening;Demonstration;Reading;Pictures/Video  -BE      Teaching needs identified  Home Exercise Program  -BE      Does patient have problems with the following?  Depression;Anxiety;Panic Attack MD aware, per patient  -BE      Pt Participated in POC and Goals  Yes  -BE         Safety    Are you being hurt, hit, or frightened by anyone at home or in your life?  No  -BE      Are you being neglected by a caregiver  No  -BE        User Key  (r) = Recorded By, (t) = Taken By, (c) = Cosigned By    Initials Name Provider Type    BE Hanna Burks PT Physical Therapist          PT Ortho     Row Name 02/13/19 1000       Posture/Observations    Posture/Observations Comments  Patient dons left knee brace; antalgic gait noted.  -BE       DTR- Lower Quarter Clearing    Patellar tendon (L2-4)  Bilateral:;2- Normal response  -BE    Achilles tendon (S1-2)  Bilateral:;2- Normal response  -BE       Sensory Screen for Light Touch- Lower Quarter Clearing    L1 (inguinal area)  Bilateral:;Intact  -BE    L2 (anterior mid thigh)  Left:;Diminished;Right:;Intact  -BE    L3 (distal anterior thigh)  Left:;Diminished;Right:;Intact  -BE    L4 (medial lower leg/foot)  Left:;Diminished;Right:;Intact  -BE    L5 (lateral lower leg/great toe)  Left:;Diminished;Right:;Intact  -BE    S1 (bottom of foot)  Left:;Diminished;Right:;Intact  -BE       Myotomal Screen- Lower Quarter Clearing    Hip flexion (L2)  Left:;4+ (Good +);Right:;5 (Normal)  -BE    Knee extension (L3)  Left:;4+ (Good +);Right:;5 (Normal) left knee strength tested in available ROM  -BE    Ankle DF (L4)  Bilateral:;5 (Normal)  -BE    Ankle PF (S1)  Bilateral:;5 (Normal)  -BE    Knee flexion (S2)  Left:;4+ (Good +);Right:;5 (Normal) left knee strength tested in available ROM  -BE       Special Tests/Palpation    Special Tests/Palpation  Knee  -BE       Knee Palpation    Knee Palpation?  Yes  -BE    Patella  Left:;Tender  -BE    Patella Tendon  Left:;Tender  -BE    Medial Joint  Line  Left:;Tender  -BE    Lateral Joint Line  Left:;Tender  -BE    Quads  Left:;Tender  -BE    Biceps Femoris  Left:;Tender;Guarded/taut  -BE    Semimembranosis  Left:;Tender;Guarded/taut  -BE    Semitendinosis  Left:;Tender;Guarded/taut  -BE    Medial Gastroc Head  Left:;Tender;Guarded/taut  -BE    Lateral Gastroc Head  Left:;Tender;Guarded/taut  -BE       Knee Special Tests    Anterior drawer (ACL lesion)  Left:;Negative  -BE    Posterior drawer (PCL lesion)  Left:;Negative  -BE    Valgus stress (MCL lesion)  Left:;Negative  -BE    Varus stress (LCL lesion)  Left:;Negative  -BE    Tom’s test (meniscal lesion)  Left:;Negative  -BE    Patellar grind test (chondromalacia patella)  Left:;Positive  -BE       General ROM    RT Lower Ext  Rt Knee Extension/Flexion  -BE    LT Lower Ext  Lt Knee Extension/Flexion  -BE       Right Lower Ext    Rt Knee Extension/Flexion AROM  0-120  -BE       Left Lower Ext    Lt Knee Extension/Flexion AROM  14-96  -BE       Girth    Girth Measured?  Right Lower Extremity;Left Lower Extremity  -BE       RLE Quick Girth (cm)    Mid patella  43.5 cm  -BE       LLE Quick Girth (cm)    Mid patella  43.5 cm  -BE      User Key  (r) = Recorded By, (t) = Taken By, (c) = Cosigned By    Initials Name Provider Type    BE Hanna Burks, PT Physical Therapist                      Therapy Education  Given: HEP, Symptoms/condition management, Pain management, Fall prevention and home safety, Edema management, Mobility training  Program: New  How Provided: Demonstration, Verbal  Provided to: Patient  Level of Understanding: Verbalized, Demonstrated     PT OP Goals     Row Name 02/13/19 1157 02/13/19 1100       PT Short Term Goals    STG Date to Achieve  --  02/27/19  -BE    STG 1  --  Patient will be independent/compliant with HEP.  -BE    STG 2  --  Left Knee ROM will improve to at least 5-105 degrees to allow for greater ease with ADLs.  -BE    STG 3  --  Patient will report pain no greater  than 7/10 when performing self-care activities.  -BE    STG 4  --  Tinetti Balance Assessment will improve to at least 20/28 to show improved balance and decreased fall risk.  -BE       Long Term Goals    LTG Date to Achieve  --  03/15/19  -BE    LTG 1  --  Patient will report pain no greater than 5/10 when ambulating with improved weightbearing fro 20 minutes to shop for groceries.  -BE    LTG 2  --  Left Knee ROM will improve to at least 0-115 to allow for improved gait pattern.  -BE    LTG 3  --  Left LE strength will improve to 5/5 to prevent reinjury.  -BE    LTG 4  --  LEFS will improve by at least 10% to show improved functional mobility.  -BE    LTG 5  --  Tinetti Balance Assessment will improve to at least 24/28 to show improved balance and decreased fall risk.  -BE       Time Calculation    PT Goal Re-Cert Due Date  03/15/19  -BE  03/15/19  -BE      User Key  (r) = Recorded By, (t) = Taken By, (c) = Cosigned By    Initials Name Provider Type    BE Hanna Burks, PT Physical Therapist          PT Assessment/Plan     Row Name 02/13/19 1100          PT Assessment    Functional Limitations  Decreased safety during functional activities;Impaired gait;Limitations in community activities;Limitation in home management;Performance in self-care ADL;Performance in leisure activities;Limitations in functional capacity and performance  -BE     Impairments  Balance;Gait;Range of motion;Sensation;Muscle strength;Pain;Posture;Joint mobility  -BE     Assessment Comments  Patient is a 48 year old male referred to therapy with left knee pain and chondromalacia.  Patient presents with decreased knee ROM, decreased LE strength, impaired gait, impaired balance, and increased pain.  Patient reports 10/10 pain at worst and 9/10 pain at best.  He displays 4+/5 strength on the left LE, compared to 5/5 strength on the right LE.  He lacks 14 degrees from neutral extension and 96 degrees of flexion.  Patient reports that he  suffers from frequent falls, noting 11 falls in the past year; decreased balance is evident based on 16/28 score on the Tinetti Balance Assessment.  Patient reports a 22.5% functional mobility impairment, based on his response to the LEFS.  Patient will benefit from skilled PT so that he can achieve his maximum level of function.  -BE     Please refer to paper survey for additional self-reported information  Yes  -BE     Rehab Potential  Good  -BE     Patient/caregiver participated in establishment of treatment plan and goals  Yes  -BE     Patient would benefit from skilled therapy intervention  Yes  -BE        PT Plan    PT Frequency  2x/week  -BE     Predicted Duration of Therapy Intervention (Therapy Eval)  4 weeks  -BE     Planned CPT's?  PT EVAL HIGH COMPLEXITY: 59271;PT RE-EVAL: 11932;PT THER PROC EA 15 MIN: 49908;PT THER ACT EA 15 MIN: 30393;PT MANUAL THERAPY EA 15 MIN: 63577;PT NEUROMUSC RE-EDUCATION EA 15 MIN: 02496;PT GAIT TRAINING EA 15 MIN: 49401;PT ELECTRICAL STIM UNATTEND: ;PT ELECTRICAL STIM ATTD EA 15 MIN: 59443;PT ULTRASOUND EA 15 MIN: 66848;PT HOT/COLD PACK WC NONMCARE: 83402;PT THER SUPP EA 15 MIN  -BE     PT Plan Comments  Above interventions to be used to promote improved functional mobility.  -BE       User Key  (r) = Recorded By, (t) = Taken By, (c) = Cosigned By    Initials Name Provider Type    BE Hanna Burks PT Physical Therapist            Exercises     Row Name 02/13/19 1100             Exercise 1    Exercise Name 1  HEP: QS, Heel slides  -BE        User Key  (r) = Recorded By, (t) = Taken By, (c) = Cosigned By    Initials Name Provider Type    BE Hanna Burks PT Physical Therapist                        Outcome Measure Options: Lower Extremity Functional Scale (LEFS), Tinetti  Lower Extremity Functional Index  Any of your usual work, housework or school activities: A little bit of difficulty  Your usual hobbies, recreational or sporting activities: A little bit of  "difficulty  Getting into or out of the bath: No difficulty  Walking between rooms: No difficulty  Putting on your shoes or socks: No difficulty  Squatting: Quite a bit of difficulty  Lifting an object, like a bag of groceries from the floor: No difficulty  Performing light activities around your home: No difficulty  Performing heavy activities around your home: Quite a bit of difficulty  Getting into or out of a car: A little bit of difficulty  Walking 2 blocks: A little bit of difficulty  Walking a mile: A little bit of difficulty  Going up or down 10 stairs (about 1 flight of stairs): Quite a bit of difficulty  Standing for 1 hour: No difficulty  Sitting for 1 hour: A little bit of difficulty  Running on even ground: A little bit of difficulty  Running on uneven ground: A little bit of difficulty  Making sharp turns while running fast: A little bit of difficulty  Hopping: No difficulty  Rolling over in bed: No difficulty  Total: 62  Tinetti Assessment  Tinetti Assessment: yes  Sitting Balance: Steady,safe  Arises: Able, uses arms  Attempts to Rise: Able in 1 attempt  Immediate Standing Balance (first 5 sec): Unsteady (sway/stagger/feet move)  Standing Balance: Steady, stance > 4 inch GAYLE & requires support  Sternal Nudge (feet close together): Stagger, grabs, catches self  Eyes Closed (feet close together): Unsteady  Turning 360 Degrees- Steps: Discontinuous steps  Turning 360 Degrees- Steadiness: Unsteady (staggers, grabs)  Sitting Down: Uses arms or not a smooth motion  Tinetti Balance Score: 7  Gait Initiation (immediate after told \"go\"): No hesitancy  Step Length- Right Swing: Right swing foot passes Left stance leg  Step Length- Left Swing: Left swing foot passes Right  Foot Clearance- Right Foot: Right foot completely clears floor  Foot Clearance- Left Foot: Left foot completely clears floor  Step Symmetry: Right and Left step length unequal  Step Continuity: Steps appear continuous  Path (excursion): " Straight without device  Trunk: No sway but knee or trunk flexion or spread arms while walking  Base of Support: Heels apart  Gait Score: 9  Tinetti Total Score: 16  Tinetti Assistive Device: (no AD)      Time Calculation:     Therapy Suggested Charges     Code   Minutes Charges    None             Start Time: 1004  Stop Time: 1100  Time Calculation (min): 56 min     Therapy Charges for Today     Code Description Service Date Service Provider Modifiers Qty    69806912868 HC PT EVAL HIGH COMPLEXITY 4 2/13/2019 Hanna Burks, PT GP 1          PT G-Codes  Outcome Measure Options: Lower Extremity Functional Scale (LEFS), Tinetti  Total: 62  Tinetti Total Score: 16         Hanna Burks, PT  2/13/2019

## 2019-02-14 ENCOUNTER — PRIOR AUTHORIZATION (OUTPATIENT)
Dept: FAMILY MEDICINE CLINIC | Facility: CLINIC | Age: 49
End: 2019-02-14

## 2019-02-15 ENCOUNTER — HOSPITAL ENCOUNTER (OUTPATIENT)
Dept: PHYSICAL THERAPY | Facility: HOSPITAL | Age: 49
Setting detail: THERAPIES SERIES
Discharge: HOME | End: 2019-02-15

## 2019-02-15 DIAGNOSIS — M94.20 CHONDROMALACIA: ICD-10-CM

## 2019-02-15 DIAGNOSIS — M25.562 LEFT KNEE PAIN, UNSPECIFIED CHRONICITY: Primary | ICD-10-CM

## 2019-02-15 PROCEDURE — 97110 THERAPEUTIC EXERCISES: CPT

## 2019-02-15 PROCEDURE — G0283 ELEC STIM OTHER THAN WOUND: HCPCS

## 2019-02-15 NOTE — THERAPY TREATMENT NOTE
"    Outpatient Physical Therapy Ortho Treatment Note   [facility]     Patient Name: Jaquan Mckay  : 1970  MRN: [id number]  Today's Date: 2/15/2019      Visit Date: 02/15/2019    Visit Dx:    ICD-10-CM ICD-9-CM   1. Left knee pain, unspecified chronicity M25.562 719.46   2. Chondromalacia M94.20 733.92       Patient Active Problem List   Diagnosis   • Gastroesophageal reflux disease without esophagitis   • Arthritis   • Hypertension   • Seizure disorder (CMS/[facility])   • Hyperlipidemia   • Anxiety   • Varicocele   • Varicocele present on ultrasound of scrotum   • Obesity (BMI 30.0-34.9)   • Chronic bilateral low back pain with left-sided sciatica   • Seasonal allergic rhinitis due to pollen   • Mild persistent asthma without complication   • Precordial pain   • Dysuria   • Congenital coronary artery anomaly   • BMI 35.0-35.9,adult   • Chondromalacia, knee   • Achilles tendinitis of both lower extremities   • Muscle spasm of both lower legs   • Personal history of allergy to shellfish   • Sensation of cold in lower extremity   • Varicose vein of leg   • Heart palpitations   • Shortness of breath   • Generalized anxiety disorder   • Chronic elbow pain, right   • Chest pain   • Unstable angina (CMS/[facility])   • ASCVD (arteriosclerotic cardiovascular disease)        Past Medical History:   Diagnosis Date   • Allergic    • Anxiety    • Arthritis    • Asthma    • Body piercing     REPORTS CYLICONE IN EARS   • Clotting disorder (CMS/HCC) 2004    had a knee surgery   • Depression    • DVT (deep venous thrombosis) (CMS/[facility])     RIGHT RIGHT KNEE AFTER SURGERY YEARS AGO IN  OR    • Gastric ulcer    • GERD (gastroesophageal reflux disease)    • H/O migraine    • H/O sleep apnea     REPORTS DIAGNOSED WITH SLEEP APNEA AND COULDN'T STAND TO WEAR THE MACHINE   • Headache    • Heart attack (CMS/[facility])     REPORTS \"LIGHT HEART ATTACK A LONG TIME AGO\"  \"EARLY 'S\"   • History of seizures     REPORTS LAST EPISODE WAS AROUND . "   • Hostility    • Hyperlipidemia    • Hypertension    • Knee pain, acute     Left   • Low back pain    • Migraine    • MRSA (methicillin resistant Staphylococcus aureus)     REPORTS LAST TESTED + 2004. WAS TREATED HE REPORTS.  RIGHT ARM, RIGHT KNEE.   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    • Obesity    • Poor historian    • [place] spotted fever    • Seizures (CMS/HCC)    • Tattoo    • Wears glasses         Past Surgical History:   Procedure Laterality Date   • BACK SURGERY     • BRAIN SURGERY  1986    Tumor removal    • CARDIAC CATHETERIZATION N/A 9/28/2018    Procedure: Left Heart Cath;  Surgeon: Leandro Daily MD;  Location:  [facility]ATH [facility]AS[facility] LOC[facility];  Service: Cardiology   • CARDIAC SURGERY      CARDIAC CATH REPORTED AS 2 MONTHS AGO IN [place]. REPORTS NO STENTS PLACED.   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  Otherwsie pt states of no changes.   -ANNAMARIA       Subjective Pain    Able to rate subjective pain?  yes  -ANNAMARIA    Pre-Treatment Pain Level  9  -ANNAMARIA    Post-Treatment Pain Level  7  -ANNAMARIA    Row Name 02/13/19 1000       Posture/Observations    Posture/Observations Comments  Patient dons left knee brace; antalgic gait noted.  -BE       DTR- Lower Quarter Clearing    Patellar tendon (L2-4)  Bilateral:;2- Normal response  -BE    Achilles tendon (S1-2)  Bilateral:;2- Normal response  -BE       Sensory Screen for Light Touch- Lower Quarter Clearing    L1 (inguinal area)  Bilateral:;Intact   -BE    L2 (anterior mid thigh)  Left:;Diminished;Right:;Intact  -BE    L3 (distal anterior thigh)  Left:;Diminished;Right:;Intact  -BE    L4 (medial lower leg/foot)  Left:;Diminished;Right:;Intact  -BE    L5 (lateral lower leg/great toe)  Left:;Diminished;Right:;Intact  -BE    S1 (bottom of foot)  Left:;Diminished;Right:;Intact  -BE       Myotomal Screen- Lower Quarter Clearing    Hip flexion (L2)  Left:;4+ (Good +);Right:;5 (Normal)  -BE    Knee extension (L3)  Left:;4+ (Good +);Right:;5 (Normal) left knee strength tested in available ROM  -BE    Ankle DF (L4)  Bilateral:;5 (Normal)  -BE    Ankle PF (S1)  Bilateral:;5 (Normal)  -BE    Knee flexion (S2)  Left:;4+ (Good +);Right:;5 (Normal) left knee strength tested in available ROM  -BE       Special Tests/Palpation    Special Tests/Palpation  Knee  -BE       Knee Palpation    Knee Palpation?  Yes  -BE    Patella  Left:;Tender  -BE    Patella Tendon  Left:;Tender  -BE    Medial Joint Line  Left:;Tender  -BE    Lateral Joint Line  Left:;Tender  -BE    Quads  Left:;Tender  -BE    Biceps Femoris  Left:;Tender;Guarded/taut  -BE    Semimembranosis  Left:;Tender;Guarded/taut  -BE    Semitendinosis  Left:;Tender;Guarded/taut  -BE    Medial Gastroc Head  Left:;Tender;Guarded/taut  -BE    Lateral Gastroc Head  Left:;Tender;Guarded/taut  -BE       Knee Special Tests    Anterior drawer (ACL lesion)  Left:;Negative  -BE    Posterior drawer (PCL lesion)  Left:;Negative  -BE    Valgus stress (MCL lesion)  Left:;Negative  -BE    Varus stress (LCL lesion)  Left:;Negative  -BE    Tom’s test (meniscal lesion)  Left:;Negative  -BE    Patellar grind test (chondromalacia patella)  Left:;Positive  -BE       General ROM    RT Lower Ext  Rt Knee Extension/Flexion  -BE    LT Lower Ext  Lt Knee Extension/Flexion  -BE       Right Lower Ext    Rt Knee Extension/Flexion AROM  0-120  -BE       Left Lower Ext    Lt Knee Extension/Flexion AROM  14-96  -BE       Girth    Girth Measured?  Right  Lower Extremity;Left Lower Extremity  -BE       RLE Quick Girth (cm)    Mid patella  43.5 cm  -BE       LLE Quick Girth (cm)    Mid patella  43.5 cm  -BE      User Key  (r) = Recorded By, (t) = Taken By, (c) = Cosigned By    Initials Name Provider Type    Leighann Shepherd PTA Physical Therapy Assistant    BE Hanna Burks, PT Physical Therapist                      PT Assessment/Plan     Row Name 02/15/19 1242          PT Assessment    Assessment Comments  Patient completed today's session w/ reports of slight decrease in pain following, 7/10.  Pt received MH and Estim to L) knee for pain control f/b therex as listed w/ focus on improved left knee range of motion and L) LE strength.  Treatment concluded with pulsed US and cryotherapy.  Pt received cues as needed throughout session for improved mechanics and for max benefit w/activities.  Pt will be progressed as tolerated.  No adverse reactions observed following modalities.   -ANNAMARIA        PT Plan    PT Plan Comments  Continue with PT's POC and progress tx as tolerated by patient.   -ANNAMARIA       User Key  (r) = Recorded By, (t) = Taken By, (c) = Cosigned By    Initials Name Provider Type    Leighann Shepherd PTA Physical Therapy Assistant          Modalities     Row Name 02/15/19 1200             Moist Heat    MH Applied  Yes no redness observed following MH  -ANNAMARIA      Location  L) knee  -ANNAMARIA      Rx Minutes  15 mins  -ANNAMARIA      MH Prior to Rx  Yes  -ANNAMARIA         Ice    Ice Applied  Yes  -ANNAMARIA      Location  L) knee  -ANNAMARIA      Rx Minutes  Other: 6 min  -ANNAMARIA      Ice S/P Rx  Yes  -ANNAMARIA         Ultrasound 14896    Location  L) knee  -ANNAMARIA      Duty Cycle  50  -ANNAMARIA      Frequency  -- 3.3MHz  -ANNAMARIA      Intensity - Wts/cm  1.2  -ANNAMARIA      41923 - PT Ultrasound Minutes  8  -ANNAMARIA         ELECTRICAL STIMULATION    Attended/Unattended  Unattended no skin irritation observed following estim  -ANNAMARIA      Stimulation Type  IFC  -ANNAMARIA      Max mAmp  -- as to pt's tolerance  -ANNAMARIA       "Location/Electrode Placement/Other  L) knee  -ANNAMARIA       PT E-Stim Unattended (Manual) Minutes  15  -ANNAMARIA        User Key  (r) = Recorded By, (t) = Taken By, (c) = Cosigned By    Initials Name Provider Type    Leighann Shepherd PTA Physical Therapy Assistant          Exercises     Row Name 02/15/19 1200             Subjective Comments    Subjective Comments  Patient arrives to therapy w/ reports of 9/10 L) knee pain.  Otherwsie pt states of no changes.   -ANNAMARIA         Subjective Pain    Able to rate subjective pain?  yes  -ANNAMARIA      Pre-Treatment Pain Level  9  -ANNAMARIA      Post-Treatment Pain Level  7  -ANNAMARIA         Total Minutes    08551 - PT Therapeutic Exercise Minutes  25  -ANNAMARIA         Exercise 1    Exercise Name 1  QS x15, heel slides x15, SLR x15, SAQ x15, ball squeeze x15, LAQ x15, gastroc stretch 3x20\"  -ANNAMARIA      Cueing 1  Verbal;Tactile;Demo  -ANNAMARIA      Time 1  25 min  -ANNAMARIA        User Key  (r) = Recorded By, (t) = Taken By, (c) = Cosigned By    Initials Name Provider Type    Leighann Shepherd, NEIL Physical Therapy Assistant                             Therapy Education  Given: HEP, Symptoms/condition management, Pain management, Posture/body mechanics  Program: Reinforced  How Provided: Verbal, Demonstration  Provided to: Patient  Level of Understanding: Verbalized, Demonstrated, Teach back education performed              Time Calculation:   Start Time: 1100  Stop Time: 1200  Time Calculation (min): 60 min  Therapy Suggested Charges     Code   Minutes Charges    96582 (CPT®) Hc Pt Neuromusc Re Education Ea 15 Min      70123 (CPT®) Hc Pt Ther Proc Ea 15 Min 25 2    66387 (CPT®) Hc Gait Training Ea 15 Min      14889 (CPT®) Hc Pt Therapeutic Act Ea 15 Min      08978 (CPT®) Hc Pt Manual Therapy Ea 15 Min      41893 (CPT®) Hc Pt Ther Massage- Per 15 Min      03208 (CPT®) Hc Pt Iontophoresis Ea 15 Min      66777 (CPT®) Hc Pt Elec Stim Ea-Per 15 Min      64875 (CPT®) Hc Pt Ultrasound Ea 15 Min 8     35750 " (CPT®) Hc Pt Self Care/Mgmt/Train Ea 15 Min      22737 (CPT®) Hc Pt Prosthetic (S) Train Initial Encounter, Each 15 Min      37109 (CPT®) Hc Orthotic(S) Mgmt/Train Initial Encounter, Each 15min      94893 (CPT®) Hc Pt Aquatic Therapy Ea 15 Min      80142 (CPT®) Hc Pt Orthotic(S)/Prosthetic(S) Encounter, Each 15 Min       (CPT®) Hc Pt Electrical Stim Unattended 15 1    Total  48 3        Therapy Charges for Today     Code Description Service Date Service Provider Modifiers Qty    21314578056 HC PT THER PROC EA 15 MIN 2/15/2019 Leighann Lancaster, PTA GP 2    09686790662 HC PT ELECTRICAL STIM UNATTENDED 2/15/2019 Leighann Lancaster, PTA  1                    Leighann Brown. Ton[place], PTA  2/15/2019

## 2019-02-19 ENCOUNTER — HOSPITAL ENCOUNTER (OUTPATIENT)
Dept: PHYSICAL THERAPY | Facility: HOSPITAL | Age: 49
Setting detail: THERAPIES SERIES
Discharge: HOME | End: 2019-02-19

## 2019-02-19 DIAGNOSIS — M25.562 LEFT KNEE PAIN, UNSPECIFIED CHRONICITY: Primary | ICD-10-CM

## 2019-02-19 DIAGNOSIS — M94.20 CHONDROMALACIA: ICD-10-CM

## 2019-02-19 PROCEDURE — G0283 ELEC STIM OTHER THAN WOUND: HCPCS

## 2019-02-19 PROCEDURE — 97035 APP MDLTY 1+ULTRASOUND EA 15: CPT

## 2019-02-19 PROCEDURE — 97110 THERAPEUTIC EXERCISES: CPT

## 2019-02-19 NOTE — THERAPY TREATMENT NOTE
"    Outpatient Physical Therapy Ortho Treatment Note   [place]     Patient Name: Jaquan Mckay  : 1970  MRN: [id number]  Today's Date: 2019      Visit Date: 2019    Visit Dx:    ICD-10-CM ICD-9-CM   1. Left knee pain, unspecified chronicity M25.562 719.46   2. Chondromalacia M94.20 733.92       Patient Active Problem List   Diagnosis   • Gastroesophageal reflux disease without esophagitis   • Arthritis   • Hypertension   • Seizure disorder (CMS/[facility])   • Hyperlipidemia   • Anxiety   • Varicocele   • Varicocele present on ultrasound of scrotum   • Obesity (BMI 30.0-34.9)   • Chronic bilateral low back pain with left-sided sciatica   • Seasonal allergic rhinitis due to pollen   • Mild persistent asthma without complication   • Precordial pain   • Dysuria   • Congenital coronary artery anomaly   • BMI 35.0-35.9,adult   • Chondromalacia, knee   • Achilles tendinitis of both lower extremities   • Muscle spasm of both lower legs   • Personal history of allergy to shellfish   • Sensation of cold in lower extremity   • Varicose vein of leg   • Heart palpitations   • Shortness of breath   • Generalized anxiety disorder   • Chronic elbow pain, right   • Chest pain   • Unstable angina (CMS/[facility])   • ASCVD (arteriosclerotic cardiovascular disease)        Past Medical History:   Diagnosis Date   • Allergic    • Anxiety    • Arthritis    • Asthma    • Body piercing     REPORTS CYLICONE IN EARS   • Clotting disorder (CMS/HCC) 2004    had a knee surgery   • Depression    • DVT (deep venous thrombosis) (CMS/[facility])     RIGHT RIGHT KNEE AFTER SURGERY YEARS AGO IN  OR    • Gastric ulcer    • GERD (gastroesophageal reflux disease)    • H/O migraine    • H/O sleep apnea     REPORTS DIAGNOSED WITH SLEEP APNEA AND COULDN'T STAND TO WEAR THE MACHINE   • Headache    • Heart attack (CMS/[facility])     REPORTS \"LIGHT HEART ATTACK A LONG TIME AGO\"  \"EARLY 'S\"   • History of seizures     REPORTS LAST EPISODE WAS AROUND . "   • Hostility    • Hyperlipidemia    • Hypertension    • Knee pain, acute     Left   • Low back pain    • Migraine    • MRSA (methicillin resistant Staphylococcus aureus)     REPORTS LAST TESTED + 2004. WAS TREATED HE REPORTS.  RIGHT ARM, RIGHT KNEE.   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    • Obesity    • Poor historian    • [place] spotted fever    • Seizures (CMS/HCC)    • Tattoo    • Wears glasses         Past Surgical History:   Procedure Laterality Date   • BACK SURGERY     • BRAIN SURGERY  1986    Tumor removal    • CARDIAC CATHETERIZATION N/A 9/28/2018    Procedure: Left Heart Cath;  Surgeon: Leandro Daily MD;  Location:  [facility] LOCATION;  Service: Cardiology   • CARDIAC SURGERY      CARDIAC CATH REPORTED AS 2 MONTHS AGO IN [place]. REPORTS NO STENTS PLACED.   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  Pt notes he bruises easily.  Otherwise pt reports 9/10 L) knee pain prior to tx.    -ANNAMARIA       Posture/Observations    Posture/Observations Comments  Pt observed ambulating into clinic w/ left knee brace donned; antalgic gait  -ANNAMARIA      User Key  (r) = Recorded By, (t) = Taken By, (c) = Cosigned By    Initials Name Provider Type    Leighann Shepherd, PTA Physical Therapy Assistant                      PT Assessment/Plan     Row Name 02/19/19 4543          PT Assessment    Assessment Comments  Patient tolerated treatment fair today w/ continued  reports of increased L) knee pain.  Pt received MH and Estim to L) knee for pain control f/b therex as listed.  LE strengthening reps increased w/ no complaints.  Treatment concluded with pulsed US; pt denies cryotherapy.  Pt required cues and demonstration throughout session for proper mechanics and for max benefit w/ activities.  No adverse reactions observed following modalities.   -ANNAMARIA        PT Plan    PT Plan Comments  Continue with PT's POC and will progress tx as tolerated by patient.   -ANNAMARIA       User Key  (r) = Recorded By, (t) = Taken By, (c) = Cosigned By    Initials Name Provider Type    Leighann Shepherd PTA Physical Therapy Assistant          Modalities     Row Name 02/19/19 1000             Moist Heat    MH Applied  Yes no redness observed following MH  -ANNAMARIA      Location  L) knee  -ANNAMARIA      Rx Minutes  15 mins  -ANNAMARIA      MH Prior to Rx  Yes w/ estim in supine position  -ANNAMARIA         Ultrasound 80594    Location  L) knee  -ANNAMARIA      Duty Cycle  50  -ANNAMARIA      Frequency  -- 3.3MHz  -ANNAMARIA      Intensity - Wts/cm  1.2  -ANNAMARIA      82576 - PT Ultrasound Minutes  8  -ANNAMARIA         ELECTRICAL STIMULATION    Attended/Unattended  Unattended no skin irritation observed following estim  -ANNAMARIA      Stimulation Type  IFC  -ANNAMARIA      Max mAmp  -- as to pt's tolerance  -ANNAMARIA      Location/Electrode Placement/Other  L) knee  -ANNAMARIA       PT E-Stim Unattended (Manual) Minutes  15  -ANNAMARIA        User Key  (r) = Recorded By, (t) = Taken By, (c) = Cosigned By    Initials Name Provider Type    Leighann Shepherd PTA Physical Therapy Assistant          Exercises     Row Name 02/19/19 1000             Subjective Comments    Subjective Comments  Patient states he bumped his L) knee on the truck door this morning, and it left a bruise.  Pt notes he bruises easily.  Otherwise pt reports 9/10 L) knee pain prior to tx.    -ANNAMARIA         Subjective Pain    Able to rate subjective pain?  yes  -ANNAMARIA      Pre-Treatment Pain Level  9  -ANNAMARIA       Post-Treatment Pain Level  7  -ANNAMARIA         Total Minutes    43774 - PT Therapeutic Exercise Minutes  30  -ANNAMARIA         Exercise 1    Exercise Name 1  QS 10x2, heel slides 10x2, SLR 10x2, SAQ 10x2, ball squeeze 10x2, LAQ 10x2, AP 15x2, knee flex w/tband (yellow) 10x2  -ANNAMARIA      Cueing 1  Verbal;Tactile;Demo  -ANNAMARIA      Time 1  30 min  -ANNAMARIA        User Key  (r) = Recorded By, (t) = Taken By, (c) = Cosigned By    Initials Name Provider Type    Leighann Shepherd, PTA Physical Therapy Assistant                             Therapy Education  Given: HEP, Symptoms/condition management, Pain management, Posture/body mechanics  Program: Reinforced, Progressed  How Provided: Verbal, Demonstration  Provided to: Patient  Level of Understanding: Verbalized, Demonstrated, Teach back education performed              Time Calculation:   Start Time: 1035  Stop Time: 1140  Time Calculation (min): 65 min  Therapy Suggested Charges     Code   Minutes Charges    20041 (CPT®) Hc Pt Neuromusc Re Education Ea 15 Min      52169 (CPT®) Hc Pt Ther Proc Ea 15 Min 30 2    28979 (CPT®) Hc Gait Training Ea 15 Min      94015 (CPT®) Hc Pt Therapeutic Act Ea 15 Min      68415 (CPT®) Hc Pt Manual Therapy Ea 15 Min      14715 (CPT®) Hc Pt Ther Massage- Per 15 Min      98118 (CPT®) Hc Pt Iontophoresis Ea 15 Min      63816 (CPT®) Hc Pt Elec Stim Ea-Per 15 Min      34826 (CPT®) Hc Pt Ultrasound Ea 15 Min 8 1    66556 (CPT®) Hc Pt Self Care/Mgmt/Train Ea 15 Min      90917 (CPT®) Hc Pt Prosthetic (S) Train Initial Encounter, Each 15 Min      72575 (CPT®) Hc Orthotic(S) Mgmt/Train Initial Encounter, Each 15min      87308 (CPT®) Hc Pt Aquatic Therapy Ea 15 Min      10308 (CPT®) Hc Pt Orthotic(S)/Prosthetic(S) Encounter, Each 15 Min       (CPT®) Hc Pt Electrical Stim Unattended 15 1    Total  53 4        Therapy Charges for Today     Code Description Service Date Service Provider Modifiers Qty    72303977876 HC PT THER PROC EA 15 MIN 2/19/2019 [place]  Leighann Brown, PTA GP 2    56271202082 HC PT ULTRASOUND EA 15 MIN 2/19/2019 Leighann Lancaster, PTA GP 1    01974559012 HC PT ELECTRICAL STIM UNATTENDED 2/19/2019 Leighann Lancaster, PTA  1                    Leighann Brown. Tonny, NEIL  2/19/2019

## 2019-02-20 ENCOUNTER — TELEPHONE (OUTPATIENT)
Dept: ORTHOPEDIC SURGERY | Facility: CLINIC | Age: 49
End: 2019-02-20

## 2019-02-20 NOTE — TELEPHONE ENCOUNTER
Left a message for the patient about his missed appointment today and offered to reschedule. Will send him a no-show appointment.

## 2019-02-22 ENCOUNTER — HOSPITAL ENCOUNTER (OUTPATIENT)
Dept: CARDIOLOGY | Facility: HOSPITAL | Age: 49
Discharge: HOME | End: 2019-02-22
Admitting: NURSE PRACTITIONER

## 2019-02-22 ENCOUNTER — HOSPITAL ENCOUNTER (OUTPATIENT)
Dept: PHYSICAL THERAPY | Facility: HOSPITAL | Age: 49
Setting detail: THERAPIES SERIES
Discharge: HOME | End: 2019-02-22

## 2019-02-22 DIAGNOSIS — M94.262 CHONDROMALACIA OF LEFT KNEE: ICD-10-CM

## 2019-02-22 DIAGNOSIS — R20.9 SENSATION OF COLD IN LOWER EXTREMITY: ICD-10-CM

## 2019-02-22 DIAGNOSIS — M94.20 CHONDROMALACIA: ICD-10-CM

## 2019-02-22 DIAGNOSIS — M25.562 LEFT KNEE PAIN, UNSPECIFIED CHRONICITY: Primary | ICD-10-CM

## 2019-02-22 PROCEDURE — 93923 UPR/LXTR ART STDY 3+ LVLS: CPT

## 2019-02-22 PROCEDURE — 97035 APP MDLTY 1+ULTRASOUND EA 15: CPT

## 2019-02-22 PROCEDURE — 97110 THERAPEUTIC EXERCISES: CPT

## 2019-02-22 PROCEDURE — 93923 UPR/LXTR ART STDY 3+ LVLS: CPT | Performed by: RADIOLOGY

## 2019-02-22 PROCEDURE — G0283 ELEC STIM OTHER THAN WOUND: HCPCS

## 2019-02-22 NOTE — THERAPY TREATMENT NOTE
"    Outpatient Physical Therapy Ortho Treatment Note   [facility]     Patient Name: Jaquan Mckay  : 1970  MRN: [id number]  Today's Date: 2019      Visit Date: 2019    Visit Dx:    ICD-10-CM ICD-9-CM   1. Left knee pain, unspecified chronicity M25.562 719.46   2. Chondromalacia M94.20 733.92       Patient Active Problem List   Diagnosis   • Gastroesophageal reflux disease without esophagitis   • Arthritis   • Hypertension   • Seizure disorder (CMS/[facility])   • Hyperlipidemia   • Anxiety   • Varicocele   • Varicocele present on ultrasound of scrotum   • Obesity (BMI 30.0-34.9)   • Chronic bilateral low back pain with left-sided sciatica   • Seasonal allergic rhinitis due to pollen   • Mild persistent asthma without complication   • Precordial pain   • Dysuria   • Congenital coronary artery anomaly   • BMI 35.0-35.9,adult   • Chondromalacia, knee   • Achilles tendinitis of both lower extremities   • Muscle spasm of both lower legs   • Personal history of allergy to shellfish   • Sensation of cold in lower extremity   • Varicose vein of leg   • Heart palpitations   • Shortness of breath   • Generalized anxiety disorder   • Chronic elbow pain, right   • Chest pain   • Unstable angina (CMS/[facility])   • ASCVD (arteriosclerotic cardiovascular disease)        Past Medical History:   Diagnosis Date   • Allergic    • Anxiety    • Arthritis    • Asthma    • Body piercing     REPORTS CYLICONE IN EARS   • Clotting disorder (CMS/HCC) 2004    had a knee surgery   • Depression    • DVT (deep venous thrombosis) (CMS/[facility])     RIGHT RIGHT KNEE AFTER SURGERY YEARS AGO IN  OR    • Gastric ulcer    • GERD (gastroesophageal reflux disease)    • H/O migraine    • H/O sleep apnea     REPORTS DIAGNOSED WITH SLEEP APNEA AND COULDN'T STAND TO WEAR THE MACHINE   • Headache    • Heart attack (CMS/[facility])     REPORTS \"LIGHT HEART ATTACK A LONG TIME AGO\"  \"EARLY 'S\"   • History of seizures     REPORTS LAST EPISODE WAS AROUND . "   • Hostility    • Hyperlipidemia    • Hypertension    • Knee pain, acute     Left   • Low back pain    • Migraine    • MRSA (methicillin resistant Staphylococcus aureus)     REPORTS LAST TESTED + 2004. WAS TREATED HE REPORTS.  RIGHT ARM, RIGHT KNEE.   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    • Obesity    • Poor historian    • Carl [place] spotted fever    • Seizures (CMS/HCC)    • Tattoo    • Wears glasses         Past Surgical History:   Procedure Laterality Date   • BACK SURGERY     • BRAIN SURGERY  1986    Tumor removal    • CARDIAC CATHETERIZATION N/A 9/28/2018    Procedure: Left Heart Cath;  Surgeon: Leandro Daily MD;  Location:  [facility] CATH INVASIVE LOCATION;  Service: Cardiology   • CARDIAC SURGERY      CARDIAC CATH REPORTED AS 2 MONTHS AGO IN [place]. REPORTS NO STENTS PLACED.   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 Patient states that the weather makes his pain worse.  -AC       Subjective Pain    Able to rate subjective pain?  yes  -AC    Pre-Treatment Pain Level  9  -AC       Posture/Observations    Posture/Observations Comments  Pt observed ambulating into clinic w/ left knee brace donned; antalgic gait  -AC      User Key  (r) = Recorded By, (t) = Taken By, (c) = Cosigned By    Initials Name Provider Type    Radha London, PTA Physical Therapy Assistant                      PT Assessment/Plan     Row Name 02/22/19 2297          PT Assessment    Assessment Comments   New ther-ex added per the patient's tolerance, patient demonstrated and understood new ther-ex with no increase in pain noted. Patient tolerated treatment session well with rest breaks taken as needed by the patient. Patient reports of increased pain in the left knee during exercises so they were stopped at that time. No adverse reactions with modalities or treatment session.  -AC        PT Plan    PT Plan Comments  Continue per PT's POC, progress per the patient's tolerance.  -AC       User Key  (r) = Recorded By, (t) = Taken By, (c) = Cosigned By    Initials Name Provider Type    AC Radha Martin PTA Physical Therapy Assistant          Modalities     Row Name 02/22/19 1100             Moist Heat    MH Applied  Yes No redness noted following moist heat  -AC      Location  L) knee  -AC      Rx Minutes  15 mins  -AC      MH Prior to Rx  Yes  -AC         Ice    Ice Applied  Yes  -AC      Location  L) knee  -AC      Rx Minutes  Other: 5 minutes  -AC      Ice S/P Rx  Yes  -AC         Ultrasound 34661    Location  L) knee  -AC      Duty Cycle  50  -AC      Frequency  -- 3.3MHz  -AC      Intensity - Wts/cm  1.2  -AC      32218 - PT Ultrasound Minutes  8  -AC         ELECTRICAL STIMULATION    Attended/Unattended  Unattended No irritation noted following estim  -AC      Stimulation Type  IFC  -AC      Max mAmp  -- per the patient's tolerance, 15 minutes with MH  -AC      Location/Electrode Placement/Other  L) knee  -AC       PT E-Stim Unattended (Manual) Minutes  15  -AC        User Key  (r) = Recorded By, (t) = Taken By, (c) = Cosigned By    Initials Name Provider Type    Radha London PTA Physical Therapy Assistant          Exercises     Row Name 02/22/19 1100             Subjective Comments    Subjective Comments  Patient reports that he is having increased pain in his left knee. Patient states that the weather makes his pain worse.  -AC         Subjective Pain    Able to rate subjective pain?   yes  -AC      Pre-Treatment Pain Level  9  -AC         Total Minutes    12519 - PT Therapeutic Exercise Minutes  30  -AC         Exercise 1    Exercise Name 1  QS 10x2, heel slides 10x2, AP 15x2, hip abd on SB x15, SLR 10x2, SAQ 10x2, ball squeeze 10x2, LAQ 10x2  -AC      Cueing 1  Verbal;Tactile;Demo  -AC      Time 1  30 min  -AC        User Key  (r) = Recorded By, (t) = Taken By, (c) = Cosigned By    Initials Name Provider Type    AC Radha Martin PTA Physical Therapy Assistant                             Therapy Education  Given: HEP, Symptoms/condition management, Pain management, Posture/body mechanics  Program: Reinforced, New  How Provided: Verbal, Demonstration  Provided to: Patient  Level of Understanding: Verbalized, Demonstrated              Time Calculation:   Start Time: 1100  Stop Time: 1200  Time Calculation (min): 60 min  Therapy Suggested Charges     Code   Minutes Charges    80334 (CPT®) Hc Pt Neuromusc Re Education Ea 15 Min      56842 (CPT®) Hc Pt Ther Proc Ea 15 Min 30 2    50384 (CPT®) Hc Gait Training Ea 15 Min      84852 (CPT®) Hc Pt Therapeutic Act Ea 15 Min      66635 (CPT®) Hc Pt Manual Therapy Ea 15 Min      61329 (CPT®) Hc Pt Ther Massage- Per 15 Min      10080 (CPT®) Hc Pt Iontophoresis Ea 15 Min      10544 (CPT®) Hc Pt Elec Stim Ea-Per 15 Min      43061 (CPT®) Hc Pt Ultrasound Ea 15 Min 8 1    01067 (CPT®) Hc Pt Self Care/Mgmt/Train Ea 15 Min      91297 (CPT®) Hc Pt Prosthetic (S) Train Initial Encounter, Each 15 Min      21774 (CPT®) Hc Orthotic(S) Mgmt/Train Initial Encounter, Each 15min      89065 (CPT®) Hc Pt Aquatic Therapy Ea 15 Min      76707 (CPT®) Hc Pt Orthotic(S)/Prosthetic(S) Encounter, Each 15 Min       (CPT®) Hc Pt Electrical Stim Unattended 15 1    Total  53 4        Therapy Charges for Today     Code Description Service Date Service Provider Modifiers Qty    83619623718 HC PT THER PROC EA 15 MIN 2/22/2019 Radha Martin PTA GP 2    72270492692  HC PT ULTRASOUND EA 15 MIN 2/22/2019 Radha Martin, PTA GP 1    38400807715 HC PT ELECTRICAL STIM UNATTENDED 2/22/2019 Radha Martin, PTA  1                    Radha Martin, PTA  2/22/2019

## 2019-02-25 ENCOUNTER — HOSPITAL ENCOUNTER (OUTPATIENT)
Dept: PHYSICAL THERAPY | Facility: HOSPITAL | Age: 49
Setting detail: THERAPIES SERIES
Discharge: HOME | End: 2019-02-25

## 2019-02-25 ENCOUNTER — TELEPHONE (OUTPATIENT)
Dept: FAMILY MEDICINE CLINIC | Facility: CLINIC | Age: 49
End: 2019-02-25

## 2019-02-25 DIAGNOSIS — M25.562 LEFT KNEE PAIN, UNSPECIFIED CHRONICITY: Primary | ICD-10-CM

## 2019-02-25 DIAGNOSIS — M94.20 CHONDROMALACIA: ICD-10-CM

## 2019-02-25 PROCEDURE — G0283 ELEC STIM OTHER THAN WOUND: HCPCS

## 2019-02-25 PROCEDURE — 97035 APP MDLTY 1+ULTRASOUND EA 15: CPT

## 2019-02-25 PROCEDURE — 97110 THERAPEUTIC EXERCISES: CPT

## 2019-02-25 NOTE — TELEPHONE ENCOUNTER
---I called karishma informed him leg doppler was normal                  -- Message from BALDOMERO Thao sent at 2/22/2019  4:24 PM EST -----  Leg doppler is good

## 2019-02-25 NOTE — THERAPY TREATMENT NOTE
"    Outpatient Physical Therapy Ortho Treatment Note   [facility]     Patient Name: Jaquan Mckay  : 1970  MRN: [id number]  Today's Date: 2019      Visit Date: 2019    Visit Dx:    ICD-10-CM ICD-9-CM   1. Left knee pain, unspecified chronicity M25.562 719.46   2. Chondromalacia M94.20 733.92       Patient Active Problem List   Diagnosis   • Gastroesophageal reflux disease without esophagitis   • Arthritis   • Hypertension   • Seizure disorder (CMS/[facility])   • Hyperlipidemia   • Anxiety   • Varicocele   • Varicocele present on ultrasound of scrotum   • Obesity (BMI 30.0-34.9)   • Chronic bilateral low back pain with left-sided sciatica   • Seasonal allergic rhinitis due to pollen   • Mild persistent asthma without complication   • Precordial pain   • Dysuria   • Congenital coronary artery anomaly   • BMI 35.0-35.9,adult   • Chondromalacia, knee   • Achilles tendinitis of both lower extremities   • Muscle spasm of both lower legs   • Personal history of allergy to shellfish   • Sensation of cold in lower extremity   • Varicose vein of leg   • Heart palpitations   • Shortness of breath   • Generalized anxiety disorder   • Chronic elbow pain, right   • Chest pain   • Unstable angina (CMS/[facility])   • ASCVD (arteriosclerotic cardiovascular disease)        Past Medical History:   Diagnosis Date   • Allergic    • Anxiety    • Arthritis    • Asthma    • Body piercing     REPORTS CYLICONE IN EARS   • Clotting disorder (CMS/HCC) 2004    had a knee surgery   • Depression    • DVT (deep venous thrombosis) (CMS/[facility])     RIGHT RIGHT KNEE AFTER SURGERY YEARS AGO IN  OR    • Gastric ulcer    • GERD (gastroesophageal reflux disease)    • H/O migraine    • H/O sleep apnea     REPORTS DIAGNOSED WITH SLEEP APNEA AND COULDN'T STAND TO WEAR THE MACHINE   • Headache    • Heart attack (CMS/[facility])     REPORTS \"LIGHT HEART ATTACK A LONG TIME AGO\"  \"EARLY 'S\"   • History of seizures     REPORTS LAST EPISODE WAS AROUND . "   • Hostility    • Hyperlipidemia    • Hypertension    • Knee pain, acute     Left   • Low back pain    • Migraine    • MRSA (methicillin resistant Staphylococcus aureus)     REPORTS LAST TESTED + 2004. WAS TREATED HE REPORTS.  RIGHT ARM, RIGHT KNEE.   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    • Obesity    • Poor historian    • Carl [place] spotted fever    • Seizures (CMS/HCC)    • Tattoo    • Wears glasses         Past Surgical History:   Procedure Laterality Date   • BACK SURGERY     • BRAIN SURGERY  1986    Tumor removal    • CARDIAC CATHETERIZATION N/A 9/28/2018    Procedure: Left Heart Cath;  Surgeon: Leandro Daily MD;  Location:  [facility] LOCATION;  Service: Cardiology   • CARDIAC SURGERY      CARDIAC CATH REPORTED AS 2 MONTHS AGO IN [place]. REPORTS NO STENTS PLACED.   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 Patient reports that he done a lot work over the weekend and that is why his pain worse.  -AC       Subjective Pain    Able to rate subjective pain?  yes  -AC    Pre-Treatment Pain Level  9  -AC      User Key  (r) = Recorded By, (t) = Taken By, (c) = Cosigned By    Initials Name Provider Type    Radha London, PTA Physical Therapy Assistant                      PT Assessment/Plan     Row Name 02/25/19 1205          PT Assessment    Assessment Comments  Patient tolerated treatment session well with rest breaks taken as needed by the patient. Educated patient to perform  ther-ex per his tolerance, patient verbalized understanding. Reps increased on several exercises with no increase in pain noted. Stretches added on to ther-ex to help decrease tightness. No adverse reactions with modalities or treatment session. Decrease in pain noted following treatment session.  -AC        PT Plan    PT Plan Comments  Continue per PT's POC, progress per the patient's tolerance.  -AC       User Key  (r) = Recorded By, (t) = Taken By, (c) = Cosigned By    Initials Name Provider Type    Radha London PTA Physical Therapy Assistant          Modalities     Row Name 02/25/19 1000             Moist Heat    MH Applied  Yes No redness noted following moist heat  -AC      Location  L) knee  -AC      Rx Minutes  15 mins  -AC      MH Prior to Rx  Yes  -AC         Ultrasound 77544    Location  L) knee  -AC      Duty Cycle  50  -AC      Frequency  -- 3.3MHz  -AC      Intensity - Wts/cm  1.2  -AC      64239 - PT Ultrasound Minutes  8  -AC         ELECTRICAL STIMULATION    Attended/Unattended  Unattended No irritation noted following estim  -AC      Stimulation Type  IFC  -AC      Max mAmp  -- per the patient's tolerance, 15 minutes with MH  -AC      Location/Electrode Placement/Other  L) knee  -AC       PT E-Stim Unattended (Manual) Minutes  15  -AC        User Key  (r) = Recorded By, (t) = Taken By, (c) = Cosigned By    Initials Name Provider Type    Radha London PTA Physical Therapy Assistant          Exercises     Row Name 02/25/19 1000             Subjective Comments    Subjective Comments  Patient states that he is having pain in his left knee. Patient reports that he done a lot work over the weekend and that is why his pain worse.  -AC         Subjective Pain    Able to rate subjective pain?  yes  -AC      Pre-Treatment Pain Level  9  -AC      Post-Treatment Pain Level  7  -AC         Total Minutes    39712 - PT Therapeutic Exercise Minutes  35  -AC         Exercise 1     Exercise Name 1  SAQ 15x2, AP 15x2, QS 10x2, heel slides 15x2, SLR 10x2, gastroc stretch 20 sec hold x3, soleus stretch 20 sec hold x3, hamsting stretch 20 sec hold x3, seated march x10, ball squeeze x15, knee flex with RTB x15  -AC      Cueing 1  Verbal;Tactile;Demo  -AC      Time 1  35 minutes  -AC        User Key  (r) = Recorded By, (t) = Taken By, (c) = Cosigned By    Initials Name Provider Type    AC Radha Martin PTA Physical Therapy Assistant                             Therapy Education  Given: Symptoms/condition management, HEP, Pain management, Posture/body mechanics  Program: Reinforced  How Provided: Verbal, Demonstration  Provided to: Patient  Level of Understanding: Verbalized, Demonstrated              Time Calculation:   Start Time: 1045  Stop Time: 1200  Time Calculation (min): 75 min  Therapy Suggested Charges     Code   Minutes Charges    43512 (CPT®) Hc Pt Neuromusc Re Education Ea 15 Min      41611 (CPT®) Hc Pt Ther Proc Ea 15 Min 35 2    33627 (CPT®) Hc Gait Training Ea 15 Min      75957 (CPT®) Hc Pt Therapeutic Act Ea 15 Min      02232 (CPT®) Hc Pt Manual Therapy Ea 15 Min      49625 (CPT®) Hc Pt Ther Massage- Per 15 Min      90203 (CPT®) Hc Pt Iontophoresis Ea 15 Min      21212 (CPT®) Hc Pt Elec Stim Ea-Per 15 Min      90542 (CPT®) Hc Pt Ultrasound Ea 15 Min 8 1    77851 (CPT®) Hc Pt Self Care/Mgmt/Train Ea 15 Min      06643 (CPT®) Hc Pt Prosthetic (S) Train Initial Encounter, Each 15 Min      05517 (CPT®) Hc Orthotic(S) Mgmt/Train Initial Encounter, Each 15min      20911 (CPT®) Hc Pt Aquatic Therapy Ea 15 Min      95652 (CPT®) Hc Pt Orthotic(S)/Prosthetic(S) Encounter, Each 15 Min       (CPT®) Hc Pt Electrical Stim Unattended 15 1    Total  58 4        Therapy Charges for Today     Code Description Service Date Service Provider Modifiers Qty    20331563096 HC PT THER PROC EA 15 MIN 2/25/2019 Radha Martin PTA GP 2    93143737500 HC PT ULTRASOUND EA 15 MIN 2/25/2019  Radha Martin, PTA GP 1    63491038628 HC PT ELECTRICAL STIM UNATTENDED 2/25/2019 Radha Martin, PTA  1                    Radha Martin, NEIL  2/25/2019

## 2019-02-27 ENCOUNTER — HOSPITAL ENCOUNTER (OUTPATIENT)
Dept: PHYSICAL THERAPY | Facility: HOSPITAL | Age: 49
Setting detail: THERAPIES SERIES
Discharge: HOME | End: 2019-02-27

## 2019-02-27 DIAGNOSIS — M25.562 LEFT KNEE PAIN, UNSPECIFIED CHRONICITY: Primary | ICD-10-CM

## 2019-02-27 PROCEDURE — 97035 APP MDLTY 1+ULTRASOUND EA 15: CPT | Performed by: PHYSICAL THERAPIST

## 2019-02-27 PROCEDURE — 97110 THERAPEUTIC EXERCISES: CPT | Performed by: PHYSICAL THERAPIST

## 2019-02-27 PROCEDURE — G0283 ELEC STIM OTHER THAN WOUND: HCPCS | Performed by: PHYSICAL THERAPIST

## 2019-02-27 NOTE — THERAPY TREATMENT NOTE
"    Outpatient Physical Therapy Ortho Treatment Note   [facility]     Patient Name: Jaquan Mckay  : 1970  MRN: [id number]  Today's Date: 2019      Visit Date: 2019    Visit Dx:    ICD-10-CM ICD-9-CM   1. Left knee pain, unspecified chronicity M25.562 719.46       Patient Active Problem List   Diagnosis   • Gastroesophageal reflux disease without esophagitis   • Arthritis   • Hypertension   • Seizure disorder (CMS/[facility])   • Hyperlipidemia   • Anxiety   • Varicocele   • Varicocele present on ultrasound of scrotum   • Obesity (BMI 30.0-34.9)   • Chronic bilateral low back pain with left-sided sciatica   • Seasonal allergic rhinitis due to pollen   • Mild persistent asthma without complication   • Precordial pain   • Dysuria   • Congenital coronary artery anomaly   • BMI 35.0-35.9,adult   • Chondromalacia, knee   • Achilles tendinitis of both lower extremities   • Muscle spasm of both lower legs   • Personal history of allergy to shellfish   • Sensation of cold in lower extremity   • Varicose vein of leg   • Heart palpitations   • Shortness of breath   • Generalized anxiety disorder   • Chronic elbow pain, right   • Chest pain   • Unstable angina (CMS/[facility])   • ASCVD (arteriosclerotic cardiovascular disease)        Past Medical History:   Diagnosis Date   • Allergic    • Anxiety    • Arthritis    • Asthma    • Body piercing     REPORTS CYLICONE IN EARS   • Clotting disorder (CMS/[facility])     had a knee surgery   • Depression    • DVT (deep venous thrombosis) (CMS/[facility])     RIGHT RIGHT KNEE AFTER SURGERY YEARS AGO IN  OR    • Gastric ulcer    • GERD (gastroesophageal reflux disease)    • H/O migraine    • H/O sleep apnea     REPORTS DIAGNOSED WITH SLEEP APNEA AND COULDN'T STAND TO WEAR THE MACHINE   • Headache    • Heart attack (CMS/[facility])     REPORTS \"LIGHT HEART ATTACK A LONG TIME AGO\"  \"EARLY S\"   • History of seizures     REPORTS LAST EPISODE WAS AROUND .   • Hostility    • Hyperlipidemia "    • Hypertension    • Knee pain, acute     Left   • Low back pain    • Migraine    • MRSA (methicillin resistant Staphylococcus aureus)     REPORTS LAST TESTED + 2004. WAS TREATED HE REPORTS.  RIGHT ARM, RIGHT KNEE.   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    • Obesity    • Poor historian    • Carl [place] spotted fever    • Seizures (CMS/HCC)    • Tattoo    • Wears glasses         Past Surgical History:   Procedure Laterality Date   • BACK SURGERY     • BRAIN SURGERY  1986    Tumor removal    • CARDIAC CATHETERIZATION N/A 9/28/2018    Procedure: Left Heart Cath;  Surgeon: Leandro Daily MD;  Location:  [facility] CATH INVASIVE LOCATION;  Service: Cardiology   • CARDIAC SURGERY      CARDIAC CATH REPORTED AS 2 MONTHS AGO IN [place]. REPORTS NO STENTS PLACED.   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  -BE       Subjective Pain    Able to rate subjective pain?  yes  -BE    Pre-Treatment Pain Level  9  -BE    Post-Treatment Pain Level  8  -BE    Row Name 02/25/19 1000       Subjective Comments    Subjective Comments  Patient states that he is having pain in his left knee. Patient reports that he done a lot work over the weekend and that is why his pain worse.  -AC       Subjective Pain    Able to rate subjective pain?  yes  -AC    Pre-Treatment Pain Level  9  -AC      User Key  (r) = Recorded By, (t) = Taken By, (c) = Cosigned By    Initials Name Provider Type    Radha London PTA  Physical Therapy Assistant    BE Hanna Burks, PT Physical Therapist                      PT Assessment/Plan     Row Name 02/27/19 1536          PT Assessment    Assessment Comments  Therapy session initiated by Leighann Lancaster PTA, with MH/ESTIM.  Ther ex performed per flow sheet, with exercises continuing to focus on LE strengthening and knee ROM.  Patient progressed in ther ex to include increased repetitions and the addition of hip abduction with theraband.  Session concluded with pulsed US; no adverse reactions were noted with modalities.  Patient will continue to be progressed per his tolerance and POC.  -BE        PT Plan    PT Plan Comments  Progress per patient's tolerance and POC.  -BE       User Key  (r) = Recorded By, (t) = Taken By, (c) = Cosigned By    Initials Name Provider Type    BE Hanna Burks PT Physical Therapist          Modalities     Row Name 02/27/19 1500             Moist Heat    MH Applied  Yes no redness following MH  -BE      Location  L) knee  -BE      Rx Minutes  15 mins  -BE      MH Prior to Rx  Yes with ESTIM in supine position  -BE         Ice    Patient denies application of Ice  Yes  -BE         Ultrasound 24566    Location  L) knee no skin irritation following US  -BE      Duty Cycle  50  -BE      Frequency  -- 3.3MHz  -BE      Intensity - Wts/cm  1.2  -BE      68253 - PT Ultrasound Minutes  8  -BE         ELECTRICAL STIMULATION    Attended/Unattended  Unattended No irritation noted following estim  -BE      Stimulation Type  IFC  -BE      Max mAmp  -- per the patient's tolerance, 15 minutes with MH  -BE      Location/Electrode Placement/Other  L) knee  -BE       PT E-Stim Unattended (Manual) Minutes  15  -BE        User Key  (r) = Recorded By, (t) = Taken By, (c) = Cosigned By    Initials Name Provider Type    BE Hanna Burks PT Physical Therapist          Exercises     Row Name 02/27/19 1500             Subjective Comments    Subjective Comments   Patient reports 9/10 pain today, noting that he has been packing/moving.  -BE         Subjective Pain    Able to rate subjective pain?  yes  -BE      Pre-Treatment Pain Level  9  -BE      Post-Treatment Pain Level  8  -BE         Total Minutes    13162 - PT Therapeutic Exercise Minutes  35  -BE         Exercise 1    Exercise Name 1  SAQ 15x2, AP 15x2, QS 10x2, heel slides 15x2, SLR 10x2, gastroc stretch 20 sec hold x3, soleus stretch 20 sec hold x3, seated march 2x10, ball squeeze 2x10, knee flex with RTB 2x10, hip abd with RTB 2x10  -BE      Cueing 1  Verbal;Tactile;Demo  -BE      Time 1  35 min  -BE        User Key  (r) = Recorded By, (t) = Taken By, (c) = Cosigned By    Initials Name Provider Type    BE Hanna Burks, PT Physical Therapist                             Therapy Education  Given: Symptoms/condition management, HEP, Pain management, Posture/body mechanics  Program: Progressed, Reinforced  How Provided: Verbal, Demonstration  Provided to: Patient  Level of Understanding: Verbalized, Demonstrated              Time Calculation:   Start Time: 1045  Stop Time: 1148  Time Calculation (min): 63 min  Therapy Suggested Charges     Code   Minutes Charges    55389 (CPT®) Hc Pt Neuromusc Re Education Ea 15 Min      20970 (CPT®) Hc Pt Ther Proc Ea 15 Min 35 2    22507 (CPT®) Hc Gait Training Ea 15 Min      42600 (CPT®) Hc Pt Therapeutic Act Ea 15 Min      32784 (CPT®) Hc Pt Manual Therapy Ea 15 Min      57790 (CPT®) Hc Pt Ther Massage- Per 15 Min      29998 (CPT®) Hc Pt Iontophoresis Ea 15 Min      11664 (CPT®) Hc Pt Elec Stim Ea-Per 15 Min      03905 (CPT®) Hc Pt Ultrasound Ea 15 Min 8 1    29143 (CPT®) Hc Pt Self Care/Mgmt/Train Ea 15 Min      96888 (CPT®) Hc Pt Prosthetic (S) Train Initial Encounter, Each 15 Min      44582 (CPT®) Hc Orthotic(S) Mgmt/Train Initial Encounter, Each 15min      75355 (CPT®) Hc Pt Aquatic Therapy Ea 15 Min      43120 (CPT®) Hc Pt Orthotic(S)/Prosthetic(S) Encounter, Each 15 Min        (CPT®) Hc Pt Electrical Stim Unattended 15 1    Total  58 4        Therapy Charges for Today     Code Description Service Date Service Provider Modifiers Qty    29320901824 HC PT THER PROC EA 15 MIN 2/27/2019 Hanna Burks, PT GP 2    97985390034 HC PT ULTRASOUND EA 15 MIN 2/27/2019 Hanna Burks, PT GP 1    29389021373 HC PT ELECTRICAL STIM UNATTENDED 2/27/2019 Hanna Burks, PT  1                    Hanna Burks, PT  2/27/2019

## 2019-03-04 ENCOUNTER — HOSPITAL ENCOUNTER (OUTPATIENT)
Dept: PHYSICAL THERAPY | Facility: HOSPITAL | Age: 49
Setting detail: THERAPIES SERIES
Discharge: HOME | End: 2019-03-04

## 2019-03-04 DIAGNOSIS — M94.20 CHONDROMALACIA: ICD-10-CM

## 2019-03-04 DIAGNOSIS — M25.562 LEFT KNEE PAIN, UNSPECIFIED CHRONICITY: Primary | ICD-10-CM

## 2019-03-04 PROCEDURE — 97035 APP MDLTY 1+ULTRASOUND EA 15: CPT | Performed by: PHYSICAL THERAPIST

## 2019-03-04 PROCEDURE — G0283 ELEC STIM OTHER THAN WOUND: HCPCS | Performed by: PHYSICAL THERAPIST

## 2019-03-04 PROCEDURE — 97110 THERAPEUTIC EXERCISES: CPT | Performed by: PHYSICAL THERAPIST

## 2019-03-04 NOTE — THERAPY DISCHARGE NOTE
"     Outpatient Physical Therapy Ortho Treatment Note/Discharge Summary   [facility]     Patient Name: Jaquan Mckay  : 1970  MRN: [id number]  Today's Date: 3/4/2019      Visit Date: 2019    Visit Dx:    ICD-10-CM ICD-9-CM   1. Left knee pain, unspecified chronicity M25.562 719.46   2. Chondromalacia M94.20 733.92       Patient Active Problem List   Diagnosis   • Gastroesophageal reflux disease without esophagitis   • Arthritis   • Hypertension   • Seizure disorder (CMS/HCC)   • Hyperlipidemia   • Anxiety   • Varicocele   • Varicocele present on ultrasound of scrotum   • Obesity (BMI 30.0-34.9)   • Chronic bilateral low back pain with left-sided sciatica   • Seasonal allergic rhinitis due to pollen   • Mild persistent asthma without complication   • Precordial pain   • Dysuria   • Congenital coronary artery anomaly   • BMI 35.0-35.9,adult   • Chondromalacia, knee   • Achilles tendinitis of both lower extremities   • Muscle spasm of both lower legs   • Personal history of allergy to shellfish   • Sensation of cold in lower extremity   • Varicose vein of leg   • Heart palpitations   • Shortness of breath   • Generalized anxiety disorder   • Chronic elbow pain, right   • Chest pain   • Unstable angina (CMS/[facility])   • ASCVD (arteriosclerotic cardiovascular disease)        Past Medical History:   Diagnosis Date   • Allergic    • Anxiety    • Arthritis    • Asthma    • Body piercing     REPORTS CYLICONE IN EARS   • Clotting disorder (CMS/HCC) 2004    had a knee surgery   • Depression    • DVT (deep venous thrombosis) (CMS/[facility])     RIGHT RIGHT KNEE AFTER SURGERY YEARS AGO IN  OR    • Gastric ulcer    • GERD (gastroesophageal reflux disease)    • H/O migraine    • H/O sleep apnea     REPORTS DIAGNOSED WITH SLEEP APNEA AND COULDN'T STAND TO WEAR THE MACHINE   • Headache    • Heart attack (CMS/[facility])     REPORTS \"LIGHT HEART ATTACK A LONG TIME AGO\"  \"EARLY 90'S\"   • History of seizures     REPORTS LAST " EPISODE WAS AROUND 1995.   • Hostility    • Hyperlipidemia    • Hypertension    • Knee pain, acute     Left   • Low back pain    • Migraine    • MRSA (methicillin resistant Staphylococcus aureus)     REPORTS LAST TESTED + 2004. WAS TREATED HE REPORTS.  RIGHT ARM, RIGHT KNEE.   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    • Obesity    • Poor historian    • [place] spotted fever    • Seizures (CMS/HCC)    • Tattoo    • Wears glasses         Past Surgical History:   Procedure Laterality Date   • BACK SURGERY     • BRAIN SURGERY  1986    Tumor removal    • CARDIAC CATHETERIZATION N/A 9/28/2018    Procedure: Left Heart Cath;  Surgeon: Leandro Daily MD;  Location:  [facility] CATH INVASIVE LOCATION;  Service: Cardiology   • CARDIAC SURGERY      CARDIAC CATH REPORTED AS 2 MONTHS AGO IN [place]. REPORTS NO STENTS PLACED.   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  He reports that he has not noticed any improvements in the knee since starting therapy.  Patient notes that he will not be returning to referring physician; when discussing POC with patient, he notes that he would like to discharge from therapy today and will see PCP next week to determine what to do regarding continued knee pain.  -BE       Subjective Pain    Able to rate subjective pain?  yes  -BE    Pre-Treatment Pain Level  9  -BE    Post-Treatment Pain Level  8  -BE        Myotomal Screen- Lower Quarter Clearing    Hip flexion (L2)  Bilateral:;5 (Normal)  -BE    Knee extension (L3)  Bilateral:;5 (Normal)  -BE    Ankle DF (L4)  Bilateral:;5 (Normal)  -BE    Ankle PF (S1)  Bilateral:;5 (Normal)  -BE    Knee flexion (S2)  Bilateral:;5 (Normal)  -BE      User Key  (r) = Recorded By, (t) = Taken By, (c) = Cosigned By    Initials Name Provider Type    BE Hanna Burks PT Physical Therapist                          Modalities     Row Name 03/04/19 1000             Moist Heat    MH Applied  Yes no redness following MH  -BE      Location  L) knee  -BE      Rx Minutes  15 mins  -BE      MH Prior to Rx  Yes with ESTIM in supine position  -BE         Ice    Patient denies application of Ice  Yes  -BE         Ultrasound 63507    Location  L) knee no skin irritation following US  -BE      Duty Cycle  50  -BE      Frequency  -- 3.3MHz  -BE      Intensity - Wts/cm  1.2  -BE      53564 - PT Ultrasound Minutes  8  -BE         ELECTRICAL STIMULATION    Attended/Unattended  Unattended No irritation noted following estim  -BE      Stimulation Type  IFC  -BE      Max mAmp  -- per the patient's tolerance, 15 minutes with MH  -BE      Location/Electrode Placement/Other  L) knee  -BE       PT E-Stim Unattended (Manual) Minutes  15  -BE        User Key  (r) = Recorded By, (t) = Taken By, (c) = Cosigned By    Initials Name Provider Type    BE Hanna Burks, PT Physical Therapist          Exercises     Row Name 03/04/19 1000             Subjective Comments    Subjective Comments  Patient reports that he has 9/10 pain today.  He reports that he has not noticed any improvements in the knee since starting therapy.  Patient notes that he will not be returning to referring physician; when discussing POC with patient, he notes that he would like to discharge from therapy today and will see PCP next week to determine what to do regarding continued knee pain.  -BE         Subjective Pain    Able to  rate subjective pain?  yes  -BE      Pre-Treatment Pain Level  9  -BE      Post-Treatment Pain Level  8  -BE         Total Minutes    15170 - PT Therapeutic Exercise Minutes  35  -BE         Exercise 1    Exercise Name 1  SAQ 15x2, AP 15x2, QS 25x, heel slides 15x2, SLR 10x2, gastroc stretch 20 sec hold x3, soleus stretch 20 sec hold x3, seated march 2x10, ball squeeze 2x10, knee flex with RTB 2x10, hip abd with RTB 2x10, LAQ 2x10  -BE      Cueing 1  Verbal;Tactile;Demo  -BE      Time 1  35 min  -BE        User Key  (r) = Recorded By, (t) = Taken By, (c) = Cosigned By    Initials Name Provider Type    BE Hanna Burks, PT Physical Therapist                         PT OP Goals     Row Name 03/04/19 1000          PT Short Term Goals    STG Date to Achieve  02/27/19  -BE     STG 1  Patient will be independent/compliant with HEP.  -BE     STG 1 Progress  Met  -BE     STG 2  Left Knee ROM will improve to at least 5-105 degrees to allow for greater ease with ADLs.  -BE     STG 2 Progress  Partially Met  -BE     STG 3  Patient will report pain no greater than 7/10 when performing self-care activities.  -BE     STG 3 Progress  Not Met  -BE     STG 3 Progress Comments  9/10 with self-care activities  -BE     STG 4  Tinetti Balance Assessment will improve to at least 20/28 to show improved balance and decreased fall risk.  -BE     STG 4 Progress  Met  -BE     STG 4 Progress Comments  21/28  -BE        Long Term Goals    LTG Date to Achieve  03/15/19  -BE     LTG 1  Patient will report pain no greater than 5/10 when ambulating with improved weightbearing fro 20 minutes to shop for groceries.  -BE     LTG 1 Progress  Not Met  -BE     LTG 1 Progress Comments  7/10 pain with ambulating; decreased weightbearing on the left LE reported.  -BE     LTG 2  Left Knee ROM will improve to at least 0-115 to allow for improved gait pattern.  -BE     LTG 2 Progress  Partially Met  -BE     LTG 3  Left LE strength will improve to 5/5 to  prevent reinjury.  -BE     LTG 3 Progress  Met  -BE     LTG 4  LEFS will improve by at least 10% to show improved functional mobility.  -BE     LTG 4 Progress  Not Met  -BE     LTG 4 Progress Comments  13.75% decrease in functional mobility since start of care (22.5% impairment at eval, 36.25% impairment on 3/4/2019)  -BE     LTG 5  Tinetti Balance Assessment will improve to at least 24/28 to show improved balance and decreased fall risk.  -BE     LTG 5 Progress  Not Met  -BE       User Key  (r) = Recorded By, (t) = Taken By, (c) = Cosigned By    Initials Name Provider Type    BE Hanna Burks PT Physical Therapist          Therapy Education  Given: Symptoms/condition management, HEP, Pain management, Posture/body mechanics  Program: Progressed, Reinforced  How Provided: Verbal, Demonstration  Provided to: Patient  Level of Understanding: Verbalized, Demonstrated    Outcome Measure Options: Lower Extremity Functional Scale (LEFS), Tinetti  Lower Extremity Functional Index  Any of your usual work, housework or school activities: A little bit of difficulty  Your usual hobbies, recreational or sporting activities: Moderate difficulty  Getting into or out of the bath: A little bit of difficulty  Walking between rooms: No difficulty  Putting on your shoes or socks: No difficulty  Squatting: Quite a bit of difficulty  Lifting an object, like a bag of groceries from the floor: A little bit of difficulty  Performing light activities around your home: No difficulty  Performing heavy activities around your home: Quite a bit of difficulty  Getting into or out of a car: No difficulty  Walking 2 blocks: No difficulty  Walking a mile: No difficulty  Going up or down 10 stairs (about 1 flight of stairs): Moderate difficulty  Standing for 1 hour: Moderate difficulty  Sitting for 1 hour: Moderate difficulty  Running on even ground: Quite a bit of difficulty  Running on uneven ground: Quite a bit of difficulty  Making sharp  "turns while running fast: Quite a bit of difficulty  Hopping: Quite a bit of difficulty  Rolling over in bed: No difficulty  Total: 51  Tinetti Assessment  Sitting Balance: Steady,safe  Arises: Able in 1 attempt  Attempts to Rise: Able in 1 attempt  Immediate Standing Balance (first 5 sec): Steady without support  Standing Balance: Steady, stance > 4 inch GAYLE & requires support  Sternal Nudge (feet close together): Steady  Eyes Closed (feet close together): Unsteady  Turning 360 Degrees- Steps: Discontinuous steps  Turning 360 Degrees- Steadiness: Steady  Sitting Down: Uses arms or not a smooth motion  Tinetti Balance Score: 12  Gait Initiation (immediate after told \"go\"): No hesitancy  Step Length- Right Swing: Right swing foot passes Left stance leg  Step Length- Left Swing: Left swing foot passes Right  Foot Clearance- Right Foot: Right foot completely clears floor  Foot Clearance- Left Foot: Left foot completely clears floor  Step Symmetry: Right and Left step length unequal  Step Continuity: Steps appear continuous  Path (excursion): Straight without device  Trunk: No sway but knee or trunk flexion or spread arms while walking  Base of Support: Heels apart  Gait Score: 9  Tinetti Total Score: 21      Time Calculation:   Start Time: 1045  Stop Time: 1152  Time Calculation (min): 67 min  Therapy Suggested Charges     Code   Minutes Charges    94612 (CPT®) Hc Pt Neuromusc Re Education Ea 15 Min      03822 (CPT®) Hc Pt Ther Proc Ea 15 Min 35 2    60818 (CPT®) Hc Gait Training Ea 15 Min      72081 (CPT®) Hc Pt Therapeutic Act Ea 15 Min      12509 (CPT®) Hc Pt Manual Therapy Ea 15 Min      83565 (CPT®) Hc Pt Ther Massage- Per 15 Min      90906 (CPT®) Hc Pt Iontophoresis Ea 15 Min      46152 (CPT®) Hc Pt Elec Stim Ea-Per 15 Min      28011 (CPT®) Hc Pt Ultrasound Ea 15 Min 8 1    81309 (CPT®) Hc Pt Self Care/Mgmt/Train Ea 15 Min      27633 (CPT®) Hc Pt Prosthetic (S) Train Initial Encounter, Each 15 Min      13044 " (CPT®) Hc Orthotic(S) Mgmt/Train Initial Encounter, Each 15min      87535 (CPT®) Hc Pt Aquatic Therapy Ea 15 Min      61833 (CPT®) Hc Pt Orthotic(S)/Prosthetic(S) Encounter, Each 15 Min       (CPT®) Hc Pt Electrical Stim Unattended 15 1    Total  58 4        Therapy Charges for Today     Code Description Service Date Service Provider Modifiers Qty    53414846893 HC PT THER PROC EA 15 MIN 3/4/2019 Hanna Burks, PT GP 2    69648961713 HC PT ULTRASOUND EA 15 MIN 3/4/2019 Hanna Burks, PT GP 1    07126930488 HC PT ELECTRICAL STIM UNATTENDED 3/4/2019 Hanna Burks, PT  1          PT G-Codes  Outcome Measure Options: Lower Extremity Functional Scale (LEFS), Tinetti  Total: 51  Tinetti Total Score: 21     OP PT Discharge Summary  Date of Discharge: 03/04/19  Reason for Discharge: Patient/Caregiver request  Outcomes Achieved: Refer to plan of care for updates on goals achieved  Discharge Destination: Home with home program  Discharge Instructions/Additional Comments: Patient to be discharged at this time, per patient's request.  Patient noted that he will be seeing PCP next week; he would like to discharge from PT and get PCP's opinion regarding continued knee pain.  Patient was provided with HEP, while attending PT.  Patient instructed that he will required new PT order, if PCP would like for him to continue with PT.      Hanna Burks, PT  3/4/2019

## 2019-03-12 ENCOUNTER — OFFICE VISIT (OUTPATIENT)
Dept: FAMILY MEDICINE CLINIC | Facility: CLINIC | Age: 49
End: 2019-03-12

## 2019-03-12 VITALS
SYSTOLIC BLOOD PRESSURE: 118 MMHG | OXYGEN SATURATION: 100 % | DIASTOLIC BLOOD PRESSURE: 80 MMHG | TEMPERATURE: 98.2 F | BODY MASS INDEX: 33.07 KG/M2 | HEART RATE: 92 BPM | WEIGHT: 205.8 LBS | HEIGHT: 66 IN

## 2019-03-12 DIAGNOSIS — F51.05 INSOMNIA DUE TO OTHER MENTAL DISORDER: ICD-10-CM

## 2019-03-12 DIAGNOSIS — R79.89 ELEVATED PROLACTIN LEVEL: Primary | ICD-10-CM

## 2019-03-12 DIAGNOSIS — F99 INSOMNIA DUE TO OTHER MENTAL DISORDER: ICD-10-CM

## 2019-03-12 DIAGNOSIS — E66.9 OBESITY (BMI 30.0-34.9): ICD-10-CM

## 2019-03-12 DIAGNOSIS — M94.262 CHONDROMALACIA OF LEFT KNEE: ICD-10-CM

## 2019-03-12 DIAGNOSIS — J45.31 MILD PERSISTENT ASTHMA WITH ACUTE EXACERBATION: ICD-10-CM

## 2019-03-12 DIAGNOSIS — G40.909 SEIZURE DISORDER (HCC): ICD-10-CM

## 2019-03-12 DIAGNOSIS — G89.29 CHRONIC BILATERAL LOW BACK PAIN WITH LEFT-SIDED SCIATICA: ICD-10-CM

## 2019-03-12 DIAGNOSIS — M54.42 CHRONIC BILATERAL LOW BACK PAIN WITH LEFT-SIDED SCIATICA: ICD-10-CM

## 2019-03-12 PROCEDURE — 99214 OFFICE O/P EST MOD 30 MIN: CPT | Performed by: NURSE PRACTITIONER

## 2019-03-12 RX ORDER — DOXEPIN HYDROCHLORIDE 10 MG/1
CAPSULE ORAL
Qty: 30 CAPSULE | Refills: 0 | Status: SHIPPED | OUTPATIENT
Start: 2019-03-12 | End: 2019-06-05 | Stop reason: DRUGHIGH

## 2019-03-12 RX ORDER — HYDROCODONE BITARTRATE AND ACETAMINOPHEN 10; 325 MG/1; MG/1
1 TABLET ORAL 2 TIMES DAILY PRN
Qty: 60 TABLET | Refills: 0 | Status: SHIPPED | OUTPATIENT
Start: 2019-03-12 | End: 2019-05-06 | Stop reason: SDUPTHER

## 2019-03-12 RX ORDER — PHENTERMINE HYDROCHLORIDE 37.5 MG/1
37.5 TABLET ORAL
Qty: 30 TABLET | Refills: 0 | Status: SHIPPED | OUTPATIENT
Start: 2019-03-12 | End: 2019-05-06 | Stop reason: SDUPTHER

## 2019-03-12 NOTE — PROGRESS NOTES
Subjective   Jaquan Mckay is a 48 y.o. male.     Chief Complaint   Patient presents with   • Hypertension   • Heartburn   • Back Pain   • Joint pain       History of Present Illness     Left knee pain-did got to PT for 3 weeks.  Reports he continues to have knee pain and spasm.  He reports the orthopedic who he saw last has dismissed him due to not keeping some appts.  He reports pain is in his lateral knee and has also began to be medially surrounding his patella as well.  He reports the numbness seems to be progressing into his distal thigh.  Not at goal.    Weight loss-has noted 7# weight loss today.  He is taking Adipex as directed. He reports his appetite has been poor.  He is not having any negative side effects of meds.    HTN-Stable.  He denies any CP.  He is taking Lisinopril 20 mg as directed.  No side effects reported  BP is stable on random checks in the community.    GERD-Stable.  Taking Protonix 40 mg as directed.  Taking Pepcid 40 mg as directed as well.  He denies any GERD symptoms.  No negative side effects of Protonix.  At goal.     The following portions of the patient's history were reviewed and updated as appropriate: allergies, current medications, past family history, past medical history, past social history, past surgical history and problem list.    Review of Systems   Constitutional: Positive for fatigue. Negative for appetite change and unexpected weight change.   HENT: Positive for nosebleeds, sinus pressure and sinus pain. Negative for congestion, ear pain, postnasal drip, rhinorrhea, sore throat, trouble swallowing and voice change.    Eyes: Negative for pain and visual disturbance.   Respiratory: Positive for cough and shortness of breath. Negative for wheezing.    Cardiovascular: Negative for chest pain, palpitations and leg swelling.   Gastrointestinal: Positive for abdominal pain, constipation and rectal pain. Negative for blood in stool and diarrhea.   Endocrine: Negative for  "cold intolerance and polydipsia.   Genitourinary: Positive for difficulty urinating and dysuria. Negative for flank pain and hematuria.   Musculoskeletal: Positive for arthralgias, back pain and myalgias. Negative for gait problem and joint swelling.   Skin: Negative for color change and rash.   Allergic/Immunologic: Negative.    Neurological: Positive for headaches. Negative for syncope and numbness.   Hematological: Negative.    Psychiatric/Behavioral: Positive for dysphoric mood and sleep disturbance. Negative for suicidal ideas. The patient is not nervous/anxious.    All other systems reviewed and are negative.      Objective     /80 (BP Location: Right arm, Patient Position: Sitting, Cuff Size: Adult)   Pulse 92   Temp 98.2 °F (36.8 °C) (Tympanic)   Ht 167.6 cm (66\")   Wt 93.4 kg (205 lb 12.8 oz)   SpO2 100%   BMI 33.22 kg/m²     Physical Exam   Constitutional: He is oriented to person, place, and time. He appears well-developed and well-nourished.   HENT:   Head: Normocephalic and atraumatic.   Right Ear: External ear and ear canal normal. Tympanic membrane is not erythematous.   Left Ear: External ear and ear canal normal. Tympanic membrane is not erythematous.   Nose: Mucosal edema and rhinorrhea present.   Mouth/Throat: No oropharyngeal exudate or posterior oropharyngeal erythema.   Eyes: Conjunctivae and EOM are normal. Pupils are equal, round, and reactive to light. No scleral icterus.   Neck: Normal range of motion. Neck supple. No JVD present. No thyromegaly present.   Cardiovascular: Normal rate, regular rhythm and normal heart sounds.   No murmur heard.  Pulmonary/Chest: Effort normal. No respiratory distress. He has no decreased breath sounds. He has no wheezes. He exhibits no tenderness.   Abdominal: Soft. Bowel sounds are normal. He exhibits no mass. There is no tenderness.   Musculoskeletal: He exhibits tenderness. He exhibits no edema.        Right elbow: Tenderness found. Medial " epicondyle and olecranon process tenderness noted.        Left knee: He exhibits decreased range of motion and swelling (mild). He exhibits no ecchymosis. Tenderness found. Medial joint line and lateral joint line tenderness noted.        Lumbar back: He exhibits decreased range of motion, tenderness, pain and spasm. He exhibits no swelling.        Right hand: He exhibits tenderness (3rd digit). He exhibits normal capillary refill.     Skin Integrity  -  His right foot skin is intact.His left foot skin is intact..  Lymphadenopathy:        Head (right side): No submandibular adenopathy present.        Head (left side): No submandibular adenopathy present.     He has no cervical adenopathy.   Neurological: He is alert and oriented to person, place, and time. He has normal reflexes. No cranial nerve deficit. He exhibits normal muscle tone. Coordination normal.   Skin: Skin is warm and dry. Capillary refill takes less than 2 seconds.   Psychiatric: His speech is normal. Judgment and thought content normal. His mood appears anxious. He is not actively hallucinating. Cognition and memory are normal. He exhibits a depressed mood. He expresses no homicidal and no suicidal ideation. He exhibits normal recent memory and normal remote memory.   Flat affect He is attentive.   Vitals reviewed.      Assessment/Plan     Problem List Items Addressed This Visit        Nervous and Auditory    Seizure disorder (CMS/HCC)    Relevant Orders    Phenytoin level, total    Phenytoin level, free    Chronic bilateral low back pain with left-sided sciatica    Relevant Medications    HYDROcodone-acetaminophen (NORCO)  MG per tablet    Other Relevant Orders    Urine Drug Screen - Urine, Clean Catch       Musculoskeletal and Integument    Chondromalacia, knee    Relevant Medications    HYDROcodone-acetaminophen (NORCO)  MG per tablet    Other Relevant Orders    Ambulatory Referral to Orthopedic Surgery (Completed)       Other     Obesity (BMI 30.0-34.9)    Relevant Medications    phentermine (ADIPEX-P) 37.5 MG tablet    Other Relevant Orders    Urine Drug Screen - Urine, Clean Catch    Elevated prolactin level (CMS/HCC) - Primary    Relevant Orders    Prolactin      Other Visit Diagnoses     Insomnia due to other mental disorder        Relevant Medications    phentermine (ADIPEX-P) 37.5 MG tablet    doxepin (SINEquan) 10 MG capsule          Patient's Body mass index is 33.22 kg/m². BMI is above normal parameters. Recommendations include: nutrition counseling and pharmacological intervention.   (Normal BMI:  18.5-24.9, OW 25-29.9, Obesity 30 or greater)      Understands disease processes and need for medications.  Understands reasons for urgent and emergent care.  Patient (& family) verbalized agreement for treatment plan.   Emotional support and active listening provided.  Patient provided time to verbalize feelings.    CHAVEZ reviewed today and consistent.  Will refill prescribed controlled medication today.  Patient is aware they cannot receive narcotics from any other provider except if under care of pain management or speciality clinic.  Risk and benefits of medication use has been reviewed.  History and physical exam exhibit continued safe and appropriate use of controlled substances.  The patient is aware of the potential for addiction and dependence.  This patient has been made aware of the appropriate use of such medications, including potential risk of somnolence, limited ability to drive and / or work safely, and potential for overdose.    It has also been made clear that these medications are for use by this patient only, without concomitant use of alcohol or other substances unless prescribed/advised by medical provider.  Patient understands they may be subject to UDS and pill counts at random.    Patient considered to be moderate risk for addiction due to use of multiple controlled medications.  Patient understands and accepts  these risks.    UDS requested per [facility] while patient in office today.  Buccal swab or blood sample will be obtained if patient is unable to provide specimen.     Goal of treatment:  Patient will have reduction in knee pain symptoms and be able to be active in and outside his home.  Patient with have reduction in BMI.     will refer to ortho in [place] per patients request as he feels his knee pain is progressive.      Reviewed arterial doppler results with patient.  Understands there are no significant finding noted.   RTC 1 month, sooner if needed.           This document has been electronically signed by:  BALDOMERO Thao, NAOMI

## 2019-03-12 NOTE — PATIENT INSTRUCTIONS
Physical Activity With Heart Disease  Exercising has many benefits, even when you have heart disease. It can help control cholesterol and high blood pressure and improve sleep. It can make your bones and heart muscle stronger. If you exercise about 20-30 minutes per day, 5 times per week, you will be able to do more and feel healthier.  Before starting an exercise program, talk with your health care provider about exercising. Your health care provider may recommend a physical exam before starting an exercise program. Your health care provider can match your fitness level with the right exercise program.  How can being active help improve my health?  When exercise is done on a regular basis, there are many benefits. Exercise can:  · Lower your blood pressure.  · Lower your cholesterol.  · Control your weight.  · Improve your sleep.  · Help control your blood sugars.  · Improve your heart and lung function.  · Strengthen your bones and reduce bone loss.  · Reduce your risk of blood clots (thrombophlebitis).  · Lower your risk of certain cancers.  · Improve your energy level.  · Reduce stress.  · Make you feel better about yourself.    How do I begin a heart-healthy exercise program?  · Talk with your health care provider about how often to exercise and what types of exercise would be good for you. Ask your health care provider if:  ? You should engage in moderate or vigorous cardiovascular exercise.  ? There are any exercises you should avoid.  ? Your medicines should be taken at a certain time.  ? You should check your pulse or take other precautions during exercise.  · Get a calendar. Write down a schedule and plan for your exercise routine.  · Consider joining a community exercise program, such as a biking group, yoga class, or swimming pool membership. You can also join a local gym. You can find free workout applications on some phones or devices, or purchase workout DVDs and exercise on your own. Find out what  works for you.  · After checking with your health care provider about approved activities, try to incorporate a variety of activities into your plan. For instance, you may do a yoga class Tuesdays and Thursdays, walk or climb stairs Mondays and Wednesdays, and then lift small weights while you are watching your favorite show on Sunday night.  · If you have not been exercising, begin with sessions that last 10 to 15 minutes. Gradually work up to sessions that last 20 to 30 minutes. Try to exercise 5 times per week. Follow all of your health care provider’s recommendations.  · Be patient with yourself. It takes time to build up strength and lung capacity.  What are some types of exercises I could try?  Cardiovascular exercise gets your heart and lungs working. Depending on your speed and intensity, a cardiovascular activity can be moderate or can become vigorous. Watch your pace and follow your health care provider’s recommendations about the intensity of your workouts.  Moderate cardiovascular exercise includes:  · Walking.  · Slow bicycling.  · Water aerobics.  · Doubles tennis.  · Ballroom dancing.  · Light gardening or yard work.    Vigorous cardiovascular exercise includes:  · Jogging or running.  · Jumping rope.  · Singles tennis.  · Stair climbing.  · Swimming laps.  · Cross-country skiing.  · Hiking uphill.  · Heavy gardening, such as digging trenches.    Strengthening exercises tense your muscles to build strength. Some examples include:  · Doing push-ups and abdominal work.  · Lifting small weights.  · Using resistance bands.  · Doing exercises with a medicine ball.  · Doing pull-ups on a pull-up bar.    Flexibility exercises lengthen your muscles to keep them flexible and less tight and improve balance. Some examples include:  · Stretching.  · Yoga.  · Jamaal chi.  · Ballet barre.    What other things will help me be successful?  · If you do not enjoy an activity, try a new one. Keep doing new things until you  find exercises that you like.  · Drink plenty of water before, during, and after exercise.  · Eat meals about 2 hours before you exercise. If you need to snack right before, eat fruit such as a banana or apple.  · Wear clothing that is comfortable, fits well, and is appropriate for the weather. Also be sure to wear supportive shoes.  · Warm up or stretch for 5 minutes before exercise. Slowly decrease your activity or cool down for 5 minutes after exercise. This can help prevent injury. Ask your health care provider for more information.  · Always exercise within your abilities and be aware of what your body is able to handle.  · If you belong to a gym, ask the staff to help you learn how to use equipment and exercise machines. Ask a physical therapist or  about proper form and techniques to prevent injury.  Are there any precautions I might need to take?  · Depending on your condition, you may need to work closely with your health care provider or specialist to come up with an approved exercise program. Follow your health care provider's instructions for recovery, cardiac rehabilitation, and a long-term exercise plan.  · Because of your heart condition, you are more sensitive to weather and environmental changes and need to be cautious. If there are extreme outdoor conditions, such as heat, humidity, or cold, you may need to exercise indoors. If chemicals or other pollutants in the air reach an unsafe level and there is an air pollution advisory, you may need to exercise indoors. Your local news, board of health, or hospital can provide information on air quality. Exercise in an indoor, climate-controlled facility if these conditions exist.  · Monitor your pulse if directed by your health care provider.  · If you feel tired, or have any heart symptoms, such as shortness of breath, dizziness, irregular heartbeat, or chest pains, stop exercising and call your health care provider or 911.  · If you have certain  types of heart disease, you may need to take extra precautions. These may include:  ? Avoiding heavy lifting.  ? Avoiding strenuous activities.  ? Making sure you include a cool-down period to give your body time to adjust.  ? Understanding how your medicines can affect you during exercise. Certain medicines may cause heat intolerance and changes in blood sugar.  ? Knowing your regular symptoms. Slow down and rest when you need to. Call your health care provider if they happen more, last longer, or feel stronger.  ? Keeping nitroglycerin spray and tablets with you at all times if you have angina. Use them as directed to prevent and treat symptoms.  When should I see my health care provider?  · You have chest pain, shortness of breath, or feel very tired.  · You have pain in the arm, shoulder, neck, or jaw.  · You feel weak, dizzy, or light-headed.  · You have an irregular heart rate or your heart rate is greater than 100 beats per minute (bpm) before exercise.  This information is not intended to replace advice given to you by your health care provider. Make sure you discuss any questions you have with your health care provider.  Document Released: 07/15/2015 Document Revised: 05/22/2017 Document Reviewed: 04/21/2015  [employer] Interactive Patient Education © 2019 [employer]  Steps to Quit Smoking  Smoking tobacco can be harmful to your health and can affect almost every organ in your body. Smoking puts you, and those around you, at risk for developing many serious chronic diseases. Quitting smoking is difficult, but it is one of the best things that you can do for your health. It is never too late to quit.  What are the benefits of quitting smoking?  When you quit smoking, you lower your risk of developing serious diseases and conditions, such as:  · Lung cancer or lung disease, such as COPD.  · Heart disease.  · Stroke.  · Heart attack.  · Infertility.  · Osteoporosis and bone fractures.    Additionally,  symptoms such as coughing, wheezing, and shortness of breath may get better when you quit. You may also find that you get sick less often because your body is stronger at fighting off colds and infections. If you are pregnant, quitting smoking can help to reduce your chances of having a baby of low birth weight.  How do I get ready to quit?  When you decide to quit smoking, create a plan to make sure that you are successful. Before you quit:  · Pick a date to quit. Set a date within the next two weeks to give you time to prepare.  · Write down the reasons why you are quitting. Keep this list in places where you will see it often, such as on your bathroom mirror or in your car or wallet.  · Identify the people, places, things, and activities that make you want to smoke (triggers) and avoid them. Make sure to take these actions:  ? Throw away all cigarettes at home, at work, and in your car.  ? Throw away smoking accessories, such as ashtrays and lighters.  ? Clean your car and make sure to empty the ashtray.  ? Clean your home, including curtains and carpets.  · Tell your family, friends, and coworkers that you are quitting. Support from your loved ones can make quitting easier.  · Talk with your health care provider about your options for quitting smoking.  · Find out what treatment options are covered by your health insurance.    What strategies can I use to quit smoking?  Talk with your healthcare provider about different strategies to quit smoking. Some strategies include:  · Quitting smoking altogether instead of gradually lessening how much you smoke over a period of time. Research shows that quitting “cold turkey” is more successful than gradually quitting.  · Attending in-person counseling to help you build problem-solving skills. You are more likely to have success in quitting if you attend several counseling sessions. Even short sessions of 10 minutes can be effective.  · Finding resources and support  systems that can help you to quit smoking and remain smoke-free after you quit. These resources are most helpful when you use them often. They can include:  ? Online chats with a counselor.  ? Telephone quitlines.  ? Printed self-help materials.  ? Support groups or group counseling.  ? Text messaging programs.  ? Mobile phone applications.  · Taking medicines to help you quit smoking. (If you are pregnant or breastfeeding, talk with your health care provider first.) Some medicines contain nicotine and some do not. Both types of medicines help with cravings, but the medicines that include nicotine help to relieve withdrawal symptoms. Your health care provider may recommend:  ? Nicotine patches, gum, or lozenges.  ? Nicotine inhalers or sprays.  ? Non-nicotine medicine that is taken by mouth.    Talk with your health care provider about combining strategies, such as taking medicines while you are also receiving in-person counseling. Using these two strategies together makes you more likely to succeed in quitting than if you used either strategy on its own.  If you are pregnant or breastfeeding, talk with your health care provider about finding counseling or other support strategies to quit smoking. Do not take medicine to help you quit smoking unless told to do so by your health care provider.  What things can I do to make it easier to quit?  Quitting smoking might feel overwhelming at first, but there is a lot that you can do to make it easier. Take these important actions:  · Reach out to your family and friends and ask that they support and encourage you during this time. Call telephone quitlines, reach out to support groups, or work with a counselor for support.  · Ask people who smoke to avoid smoking around you.  · Avoid places that trigger you to smoke, such as bars, parties, or smoke-break areas at work.  · Spend time around people who do not smoke.  · Lessen stress in your life, because stress can be a  smoking trigger for some people. To lessen stress, try:  ? Exercising regularly.  ? Deep-breathing exercises.  ? Yoga.  ? Meditating.  ? Performing a body scan. This involves closing your eyes, scanning your body from head to toe, and noticing which parts of your body are particularly tense. Purposefully relax the muscles in those areas.  · Download or purchase mobile phone or tablet apps (applications) that can help you stick to your quit plan by providing reminders, tips, and encouragement. There are many free apps, such as QuitGuide from the CDC (Centers for Disease Control and Prevention). You can find other support for quitting smoking (smoking cessation) through smokefree.gov and other websites.    How will I feel when I quit smoking?  Within the first 24 hours of quitting smoking, you may start to feel some withdrawal symptoms. These symptoms are usually most noticeable 2-3 days after quitting, but they usually do not last beyond 2-3 weeks. Changes or symptoms that you might experience include:  · Mood swings.  · Restlessness, anxiety, or irritation.  · Difficulty concentrating.  · Dizziness.  · Strong cravings for sugary foods in addition to nicotine.  · Mild weight gain.  · Constipation.  · Nausea.  · Coughing or a sore throat.  · Changes in how your medicines work in your body.  · A depressed mood.  · Difficulty sleeping (insomnia).    After the first 2-3 weeks of quitting, you may start to notice more positive results, such as:  · Improved sense of smell and taste.  · Decreased coughing and sore throat.  · Slower heart rate.  · Lower blood pressure.  · Clearer skin.  · The ability to breathe more easily.  · Fewer sick days.    Quitting smoking is very challenging for most people. Do not get discouraged if you are not successful the first time. Some people need to make many attempts to quit before they achieve long-term success. Do your best to stick to your quit plan, and talk with your health care  provider if you have any questions or concerns.  This information is not intended to replace advice given to you by your health care provider. Make sure you discuss any questions you have with your health care provider.  Document Released: 12/12/2002 Document Revised: 07/24/2018 Document Reviewed: 05/03/2016  [employer] Interactive Patient Education © 2019 [employer]

## 2019-03-29 ENCOUNTER — LAB (OUTPATIENT)
Dept: LAB | Facility: HOSPITAL | Age: 49
End: 2019-03-29

## 2019-03-29 DIAGNOSIS — G40.909 SEIZURE DISORDER (HCC): ICD-10-CM

## 2019-03-29 DIAGNOSIS — R79.89 ELEVATED PROLACTIN LEVEL: ICD-10-CM

## 2019-03-29 LAB
6-ACETYL MORPHINE: NEGATIVE
AMPHET+METHAMPHET UR QL: NEGATIVE
BARBITURATES UR QL SCN: NEGATIVE
BENZODIAZ UR QL SCN: NEGATIVE
BUPRENORPHINE SERPL-MCNC: NEGATIVE NG/ML
CANNABINOIDS SERPL QL: NEGATIVE
COCAINE UR QL: NEGATIVE
METHADONE UR QL SCN: NEGATIVE
OPIATES UR QL: POSITIVE
OXYCODONE UR QL SCN: NEGATIVE
PCP UR QL SCN: NEGATIVE
PROLACTIN SERPL-MCNC: 15.8 NG/ML (ref 4.04–15.2)

## 2019-03-29 PROCEDURE — 36415 COLL VENOUS BLD VENIPUNCTURE: CPT

## 2019-03-29 PROCEDURE — 80307 DRUG TEST PRSMV CHEM ANLYZR: CPT | Performed by: NURSE PRACTITIONER

## 2019-03-29 PROCEDURE — 84146 ASSAY OF PROLACTIN: CPT

## 2019-03-29 PROCEDURE — 80186 ASSAY OF PHENYTOIN FREE: CPT

## 2019-04-01 LAB — PHENYTOIN FREE SERPL-MCNC: 0.7 UG/ML (ref 1–2)

## 2019-04-03 DIAGNOSIS — R79.89 ELEVATED PROLACTIN LEVEL: Primary | ICD-10-CM

## 2019-04-12 ENCOUNTER — HOSPITAL ENCOUNTER (EMERGENCY)
Facility: HOSPITAL | Age: 49
Discharge: HOME | End: 2019-04-12
Attending: EMERGENCY MEDICINE | Admitting: EMERGENCY MEDICINE

## 2019-04-12 ENCOUNTER — APPOINTMENT (OUTPATIENT)
Dept: GENERAL RADIOLOGY | Facility: HOSPITAL | Age: 49
End: 2019-04-12

## 2019-04-12 VITALS
TEMPERATURE: 98.6 F | DIASTOLIC BLOOD PRESSURE: 72 MMHG | SYSTOLIC BLOOD PRESSURE: 148 MMHG | WEIGHT: 202 LBS | HEART RATE: 72 BPM | RESPIRATION RATE: 18 BRPM | BODY MASS INDEX: 31.71 KG/M2 | OXYGEN SATURATION: 100 % | HEIGHT: 67 IN

## 2019-04-12 DIAGNOSIS — K59.00 CONSTIPATION, UNSPECIFIED CONSTIPATION TYPE: Primary | ICD-10-CM

## 2019-04-12 LAB
ALBUMIN SERPL-MCNC: 4.46 G/DL (ref 3.5–5.2)
ALBUMIN/GLOB SERPL: 1.2 G/DL
ALP SERPL-CCNC: 91 U/L (ref 39–117)
ALT SERPL W P-5'-P-CCNC: 19 U/L (ref 1–41)
ANION GAP SERPL CALCULATED.3IONS-SCNC: 13.7 MMOL/L
AST SERPL-CCNC: 21 U/L (ref 1–40)
BASOPHILS # BLD AUTO: 0.01 10*3/MM3 (ref 0–0.2)
BASOPHILS NFR BLD AUTO: 0.1 % (ref 0–1.5)
BILIRUB SERPL-MCNC: 0.3 MG/DL (ref 0.2–1.2)
BILIRUB UR QL STRIP: NEGATIVE
BUN BLD-MCNC: 14 MG/DL (ref 6–20)
BUN/CREAT SERPL: 13.6 (ref 7–25)
CALCIUM SPEC-SCNC: 8.8 MG/DL (ref 8.6–10.5)
CHLORIDE SERPL-SCNC: 99 MMOL/L (ref 98–107)
CLARITY UR: CLEAR
CO2 SERPL-SCNC: 26.3 MMOL/L (ref 22–29)
COLOR UR: YELLOW
CREAT BLD-MCNC: 1.03 MG/DL (ref 0.76–1.27)
DEPRECATED RDW RBC AUTO: 43.9 FL (ref 37–54)
EOSINOPHIL # BLD AUTO: 0.09 10*3/MM3 (ref 0–0.4)
EOSINOPHIL NFR BLD AUTO: 1.3 % (ref 0.3–6.2)
ERYTHROCYTE [DISTWIDTH] IN BLOOD BY AUTOMATED COUNT: 13.1 % (ref 12.3–15.4)
GFR SERPL CREATININE-BSD FRML MDRD: 78 ML/MIN/1.73
GLOBULIN UR ELPH-MCNC: 3.6 GM/DL
GLUCOSE BLD-MCNC: 82 MG/DL (ref 65–99)
GLUCOSE UR STRIP-MCNC: NEGATIVE MG/DL
HCT VFR BLD AUTO: 47.2 % (ref 37.5–51)
HGB BLD-MCNC: 16.4 G/DL (ref 13–17.7)
HGB UR QL STRIP.AUTO: NEGATIVE
HOLD SPECIMEN: NORMAL
HOLD SPECIMEN: NORMAL
IMM GRANULOCYTES # BLD AUTO: 0.01 10*3/MM3 (ref 0–0.05)
IMM GRANULOCYTES NFR BLD AUTO: 0.1 % (ref 0–0.5)
KETONES UR QL STRIP: NEGATIVE
LEUKOCYTE ESTERASE UR QL STRIP.AUTO: NEGATIVE
LIPASE SERPL-CCNC: 41 U/L (ref 13–60)
LYMPHOCYTES # BLD AUTO: 2.59 10*3/MM3 (ref 0.7–3.1)
LYMPHOCYTES NFR BLD AUTO: 36.4 % (ref 19.6–45.3)
MCH RBC QN AUTO: 33.1 PG (ref 26.6–33)
MCHC RBC AUTO-ENTMCNC: 34.7 G/DL (ref 31.5–35.7)
MCV RBC AUTO: 95.4 FL (ref 79–97)
MONOCYTES # BLD AUTO: 0.95 10*3/MM3 (ref 0.1–0.9)
MONOCYTES NFR BLD AUTO: 13.3 % (ref 5–12)
NEUTROPHILS # BLD AUTO: 3.47 10*3/MM3 (ref 1.4–7)
NEUTROPHILS NFR BLD AUTO: 48.8 % (ref 42.7–76)
NITRITE UR QL STRIP: NEGATIVE
PH UR STRIP.AUTO: 6.5 [PH] (ref 5–8)
PHENYTOIN SERPL-MCNC: 13.7 MCG/ML (ref 10–20)
PLATELET # BLD AUTO: 186 10*3/MM3 (ref 140–450)
PMV BLD AUTO: 9.8 FL (ref 6–12)
POTASSIUM BLD-SCNC: 4 MMOL/L (ref 3.5–5.2)
PROT SERPL-MCNC: 8.1 G/DL (ref 6–8.5)
PROT UR QL STRIP: NEGATIVE
RBC # BLD AUTO: 4.95 10*6/MM3 (ref 4.14–5.8)
SODIUM BLD-SCNC: 139 MMOL/L (ref 136–145)
SP GR UR STRIP: 1.01 (ref 1–1.03)
UROBILINOGEN UR QL STRIP: NORMAL
WBC NRBC COR # BLD: 7.12 10*3/MM3 (ref 3.4–10.8)
WHOLE BLOOD HOLD SPECIMEN: NORMAL
WHOLE BLOOD HOLD SPECIMEN: NORMAL

## 2019-04-12 PROCEDURE — 99284 EMERGENCY DEPT VISIT MOD MDM: CPT

## 2019-04-12 PROCEDURE — 85025 COMPLETE CBC W/AUTO DIFF WBC: CPT | Performed by: FAMILY MEDICINE

## 2019-04-12 PROCEDURE — 25010000002 KETOROLAC TROMETHAMINE PER 15 MG: Performed by: EMERGENCY MEDICINE

## 2019-04-12 PROCEDURE — 80185 ASSAY OF PHENYTOIN TOTAL: CPT | Performed by: EMERGENCY MEDICINE

## 2019-04-12 PROCEDURE — 25010000002 METHYLNALTREXONE 12 MG/0.6ML SOLUTION: Performed by: EMERGENCY MEDICINE

## 2019-04-12 PROCEDURE — 74022 RADEX COMPL AQT ABD SERIES: CPT

## 2019-04-12 PROCEDURE — 83690 ASSAY OF LIPASE: CPT | Performed by: EMERGENCY MEDICINE

## 2019-04-12 PROCEDURE — 96372 THER/PROPH/DIAG INJ SC/IM: CPT

## 2019-04-12 PROCEDURE — 80053 COMPREHEN METABOLIC PANEL: CPT | Performed by: EMERGENCY MEDICINE

## 2019-04-12 PROCEDURE — 74022 RADEX COMPL AQT ABD SERIES: CPT | Performed by: RADIOLOGY

## 2019-04-12 PROCEDURE — 81003 URINALYSIS AUTO W/O SCOPE: CPT | Performed by: EMERGENCY MEDICINE

## 2019-04-12 RX ORDER — POLYETHYLENE GLYCOL 3350 17 G/17G
17 POWDER, FOR SOLUTION ORAL DAILY
Qty: 578 G | Refills: 0 | Status: SHIPPED | OUTPATIENT
Start: 2019-04-12 | End: 2019-08-07 | Stop reason: SDUPTHER

## 2019-04-12 RX ORDER — KETOROLAC TROMETHAMINE 30 MG/ML
60 INJECTION, SOLUTION INTRAMUSCULAR; INTRAVENOUS ONCE
Status: COMPLETED | OUTPATIENT
Start: 2019-04-12 | End: 2019-04-12

## 2019-04-12 RX ORDER — MAGNESIUM CARB/ALUMINUM HYDROX 105-160MG
296 TABLET,CHEWABLE ORAL ONCE
Status: COMPLETED | OUTPATIENT
Start: 2019-04-12 | End: 2019-04-12

## 2019-04-12 RX ADMIN — KETOROLAC TROMETHAMINE 60 MG: 60 INJECTION, SOLUTION INTRAMUSCULAR at 23:01

## 2019-04-12 RX ADMIN — METHYLNALTREXONE BROMIDE 12 MG: 12 INJECTION, SOLUTION SUBCUTANEOUS at 21:18

## 2019-04-12 RX ADMIN — CITROMA MAGNESIUM CITRATE 296 ML: 1.75 LIQUID ORAL at 23:02

## 2019-04-13 NOTE — ED PROVIDER NOTES
"Subjective   Patient with lower abdominal pain today.  States had had bowel movement in 5-7 days.  His doctor gave him some laxative pill that did not help        History provided by:  Patient   used: No    Abdominal Pain   Pain location:  Suprapubic  Pain quality: aching and cramping    Pain radiates to:  Back  Pain severity:  Moderate  Onset quality:  Gradual  Duration:  1 day  Timing:  Constant  Progression:  Worsening  Chronicity:  New  Context comment:  No bowel movement in 1 week  Relieved by:  Nothing  Worsened by:  Nothing  Ineffective treatments: Took unknown laxative pill.  Associated symptoms: constipation    Associated symptoms: no chest pain, no dysuria and no fever    Risk factors comment:  Narcotic use      Review of Systems   Constitutional: Negative.  Negative for fever.   HENT: Negative.    Respiratory: Negative.    Cardiovascular: Negative.  Negative for chest pain.   Gastrointestinal: Positive for abdominal pain and constipation.   Endocrine: Negative.    Genitourinary: Negative.  Negative for dysuria.   Skin: Negative.    Neurological: Negative.    Psychiatric/Behavioral: Negative.    All other systems reviewed and are negative.      Past Medical History:   Diagnosis Date   • Allergic    • Anxiety    • Arthritis    • Asthma    • Body piercing     REPORTS CYLICONE IN EARS   • Clotting disorder (CMS/[facility]) 2004    had a knee surgery   • Depression    • DVT (deep venous thrombosis) (CMS/[facility])     RIGHT RIGHT KNEE AFTER SURGERY YEARS AGO IN 2001 OR 2004   • Gastric ulcer    • GERD (gastroesophageal reflux disease)    • H/O migraine    • H/O sleep apnea     REPORTS DIAGNOSED WITH SLEEP APNEA AND COULDN'T STAND TO WEAR THE MACHINE   • Headache    • Heart attack (CMS/[facility])     REPORTS \"LIGHT HEART ATTACK A LONG TIME AGO\"  \"EARLY 90'S\"   • History of seizures     REPORTS LAST EPISODE WAS AROUND 1995.   • Hostility    • Hyperlipidemia    • Hypertension    • Knee pain, acute     Left   • " "Low back pain    • Migraine    • MRSA (methicillin resistant Staphylococcus aureus)     REPORTS LAST TESTED + 2004. WAS TREATED HE REPORTS.  RIGHT ARM, RIGHT KNEE.   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    • Doxycycline Hives   • Fish-Derived Products Hives   • Mobic [Meloxicam] Other (See Comments)     Pt states, \"It make my feet and hands go numb and I can't hardly walk.\"    • Pristiq [Desvenlafaxine Succinate Er] Dizziness   • Robitussin Cough+ Chest Max St [Dextromethorphan-Guaifenesin] Unknown (See Comments)     Pt states, \"I don't remember its been so long.\"    • Zoloft [Sertraline Hcl] Hives and Itching   • Contrast Dye Itching and Rash   • Diltiazem Rash   • Gabapentin Rash   • Keflex [Cephalexin] Rash   • Metoprolol Rash   • Prednisone Rash and Other (See Comments)     Face, feet, and legs go completely numb per patient   • Shrimp (Diagnostic) Rash   • Spironolactone Rash   • Viibryd [Vilazodone Hcl] Itching and Rash       Past Surgical History:   Procedure Laterality Date   • BACK SURGERY     • BRAIN SURGERY  1986    Tumor removal    • CARDIAC CATHETERIZATION N/A 9/28/2018    Procedure: Left Heart Cath;  Surgeon: Leandro Daily MD;  Location: [facility] CATH INVASIVE LOCATION;  Service: Cardiology   • CARDIAC SURGERY      CARDIAC CATH REPORTED AS 2 MONTHS AGO IN [place]. REPORTS NO STENTS PLACED.   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       Family History   Problem Relation Age of Onset   • Diabetes Mother    • Hypertension Mother    • Stroke Mother    • Diabetes Father    • Skin cancer Father    • Hypertension Father    • Heart attack Father    • Diabetes Brother    • Hypertension Brother    • Heart disease Maternal Aunt    • Heart disease Maternal Uncle    • Heart disease Paternal Aunt    • Heart disease Paternal Uncle    • Heart disease Maternal Grandmother    • Heart disease Maternal Grandfather    • Heart disease Paternal Grandmother    • Heart disease Paternal Grandfather        Social History     Socioeconomic History   • Marital status:      Spouse name: Becca   • Number of children: 2   • Years of education: 12   • Highest education level: Not on file   Occupational History   • Occupation: DISABLED   Social Needs   • Financial resource strain: Somewhat hard   • Food insecurity:     Worry: Sometimes true     Inability: Sometimes true   • Transportation needs:     Medical: No     Non-medical: No   Tobacco Use   • Smoking status: Current Every Day Smoker     Packs/day: 2.00     Years: 17.00     Pack years: 34.00     Types: Cigars, Cigarettes     Start date: 5/5/2010   • Smokeless tobacco: Never Used   Substance and Sexual Activity   • Alcohol use: No   • Drug use: No   • Sexual activity: Defer   Lifestyle   • Physical activity:     Days per week: 0 days     Minutes per session: 0 min   • Stress: To some extent   Relationships   • Social connections:     Talks on phone: Once a week     Gets together: Once a week     Attends Congregational service: Never     Active member of club or organization: No     Attends meetings of clubs or organizations: Never     Relationship status:             Objective   Physical Exam   Constitutional: He is oriented to person, place, and time. He appears well-developed and well-nourished. No distress.   HENT:   Head: Normocephalic and atraumatic.   Right Ear: External ear normal.   Left Ear: External ear normal.   Nose: Nose normal.   Eyes: Conjunctivae and EOM are normal. Pupils are equal, round, and reactive to light.   Neck: Normal range of motion. Neck supple. No JVD present. No tracheal deviation present.   Cardiovascular: Normal rate, regular rhythm and normal heart sounds.   No murmur heard.  Pulmonary/Chest: Effort normal and breath sounds normal. No respiratory distress. He has no wheezes.   Abdominal: Soft. Bowel sounds are normal. There is tenderness in the suprapubic area.   Very mild tenderness without rebound or guarding   Musculoskeletal: Normal range of motion. He exhibits no edema or deformity.   Neurological: He is alert and oriented to person, place, and time. No cranial nerve deficit.   Skin: Skin is warm and dry. No rash noted. He is not diaphoretic. No erythema. No pallor.   Psychiatric: He has a normal mood and affect. His behavior is normal. Thought content normal.   Nursing note and vitals reviewed.      Procedures        XR Abdomen 2 View With Chest 1 View   ED Interpretation   Nonspecific, increased stool              ED Course                  MDM      Final diagnoses:   Constipation, unspecified constipation type            Leonides Krause MD  04/12/19 0975

## 2019-05-06 ENCOUNTER — OFFICE VISIT (OUTPATIENT)
Dept: FAMILY MEDICINE CLINIC | Facility: CLINIC | Age: 49
End: 2019-05-06

## 2019-05-06 VITALS
BODY MASS INDEX: 32.9 KG/M2 | TEMPERATURE: 97.9 F | OXYGEN SATURATION: 97 % | WEIGHT: 209.6 LBS | DIASTOLIC BLOOD PRESSURE: 82 MMHG | HEART RATE: 97 BPM | HEIGHT: 67 IN | SYSTOLIC BLOOD PRESSURE: 120 MMHG

## 2019-05-06 DIAGNOSIS — F41.9 ANXIETY: ICD-10-CM

## 2019-05-06 DIAGNOSIS — E66.9 OBESITY (BMI 30.0-34.9): ICD-10-CM

## 2019-05-06 DIAGNOSIS — M54.42 CHRONIC BILATERAL LOW BACK PAIN WITH LEFT-SIDED SCIATICA: ICD-10-CM

## 2019-05-06 DIAGNOSIS — F41.1 GENERALIZED ANXIETY DISORDER: ICD-10-CM

## 2019-05-06 DIAGNOSIS — K59.03 DRUG-INDUCED CONSTIPATION: Primary | ICD-10-CM

## 2019-05-06 DIAGNOSIS — G89.29 CHRONIC BILATERAL LOW BACK PAIN WITH LEFT-SIDED SCIATICA: ICD-10-CM

## 2019-05-06 DIAGNOSIS — K92.1 HEMATOCHEZIA: ICD-10-CM

## 2019-05-06 DIAGNOSIS — M94.262 CHONDROMALACIA OF LEFT KNEE: ICD-10-CM

## 2019-05-06 PROCEDURE — 99214 OFFICE O/P EST MOD 30 MIN: CPT | Performed by: NURSE PRACTITIONER

## 2019-05-06 RX ORDER — HYDROCODONE BITARTRATE AND ACETAMINOPHEN 10; 325 MG/1; MG/1
1 TABLET ORAL 2 TIMES DAILY PRN
Qty: 60 TABLET | Refills: 0 | Status: SHIPPED | OUTPATIENT
Start: 2019-05-06 | End: 2019-06-05 | Stop reason: SDUPTHER

## 2019-05-06 RX ORDER — PHENTERMINE HYDROCHLORIDE 37.5 MG/1
37.5 TABLET ORAL
Qty: 30 TABLET | Refills: 0 | Status: SHIPPED | OUTPATIENT
Start: 2019-05-06 | End: 2019-06-05

## 2019-05-06 NOTE — PROGRESS NOTES
"Subjective   Jaquan Mckay is a 48 y.o. male.     Chief Complaint   Patient presents with   • Elevated prolactin level     1 mon follow up       History of Present Illness     Knee Pain-has been to [facility] to [facility]o.  He reports he did have multiple test and was told \"too young for replacement\".  \"bone spurs\" and received a cortisone injection into his knee.  He was reordered PT.  He will follow up after PT if needed.  He did report to the provider about his falling and the numbness in his leg.  He has been advised to use a brace.  He reports he is weaning off his Norco.  Ongoing.   ED follow up for Constipation-he was seen at [facility] ED for abdominal pain.  He reports he did drink Mag Citrate that he was given but he did not have BM.  He reports he continues to not have good movement and feels like he continues to have cutting pain in his abdomen.  He reports pain distal to his umbilical area.  He reports miralax has not been effective.  He would like a referral for colonscopy due to some bleeding with BM.  He is concerned about hemorrhoids and some bleeding with hard bowel movements.  Not at goal. Ongoing.    Weight loss- patient continues to try to work on weight reduction.  He has noted gain since his last visit.  He denies any negative side effects of Adipex.  He continues to try to be active and walk daily.  He does watch his diet but at times eats out.  Ongoing    The following portions of the patient's history were reviewed and updated as appropriate: allergies, current medications, past family history, past medical history, past social history, past surgical history and problem list.    Review of Systems   Constitutional: Positive for chills, fatigue and fever.   HENT: Positive for congestion, rhinorrhea, sinus pressure, sinus pain and sore throat.    Respiratory: Positive for cough.    Gastrointestinal: Positive for constipation.   Neurological: Positive for dizziness, weakness, light-headedness and headaches. " "  All other systems reviewed and are negative.      Objective     /82 (BP Location: Right arm, Patient Position: Sitting, Cuff Size: Adult)   Pulse 97   Temp 97.9 °F (36.6 °C) (Temporal)   Ht 170.2 cm (67\")   Wt 95.1 kg (209 lb 9.6 oz)   SpO2 97%   BMI 32.83 kg/m²     Physical Exam   Constitutional: He is oriented to person, place, and time. He appears well-developed and well-nourished.   HENT:   Head: Normocephalic and atraumatic.   Right Ear: External ear and ear canal normal. Tympanic membrane is not erythematous.   Left Ear: External ear and ear canal normal. Tympanic membrane is not erythematous.   Nose: Mucosal edema and rhinorrhea present.   Mouth/Throat: No oropharyngeal exudate or posterior oropharyngeal erythema.   Eyes: Conjunctivae and EOM are normal. Pupils are equal, round, and reactive to light. No scleral icterus.   Neck: Normal range of motion. Neck supple. No JVD present. No thyromegaly present.   Cardiovascular: Normal rate, regular rhythm and normal heart sounds.   No murmur heard.  Pulmonary/Chest: Effort normal. No respiratory distress. He has no decreased breath sounds. He has no wheezes. He exhibits no tenderness.   Abdominal: Soft. Bowel sounds are normal. He exhibits no mass. There is no tenderness.   Musculoskeletal: He exhibits tenderness. He exhibits no edema.        Right elbow: Tenderness found. Medial epicondyle and olecranon process tenderness noted.        Left knee: He exhibits decreased range of motion and swelling (mild). He exhibits no ecchymosis. Tenderness found. Medial joint line and lateral joint line tenderness noted.        Lumbar back: He exhibits decreased range of motion, tenderness, pain and spasm. He exhibits no swelling.        Right hand: He exhibits tenderness (3rd digit). He exhibits normal capillary refill.     Skin Integrity  -  His right foot skin is intact.His left foot skin is intact..  Lymphadenopathy:        Head (right side): No submandibular " adenopathy present.        Head (left side): No submandibular adenopathy present.     He has no cervical adenopathy.   Neurological: He is alert and oriented to person, place, and time. He has normal reflexes. No cranial nerve deficit. He exhibits normal muscle tone. Coordination normal.   Skin: Skin is warm and dry. Capillary refill takes less than 2 seconds.   Varicosities noted on bilateral lower extremities   Psychiatric: His speech is normal. Judgment and thought content normal. His mood appears anxious. He is not actively hallucinating. Cognition and memory are normal. He exhibits a depressed mood. He expresses no homicidal and no suicidal ideation. He exhibits normal recent memory and normal remote memory.   Flat affect He is attentive.   Vitals reviewed.      Assessment/Plan     Problem List Items Addressed This Visit        Nervous and Auditory    Chronic bilateral low back pain with left-sided sciatica    Relevant Medications    HYDROcodone-acetaminophen (NORCO)  MG per tablet       Musculoskeletal and Integument    Chondromalacia, knee    Relevant Medications    HYDROcodone-acetaminophen (NORCO)  MG per tablet       Other    Obesity (BMI 30.0-34.9)    Relevant Medications    phentermine (ADIPEX-P) 37.5 MG tablet      Other Visit Diagnoses     Drug-induced constipation    -  Primary    Hematochezia        Relevant Orders    Ambulatory Referral to General Surgery          Patient's Body mass index is 32.83 kg/m². BMI is above normal parameters. Recommendations include: nutrition counseling and pharmacological intervention.   (Normal BMI:  18.5-24.9, OW 25-29.9, Obesity 30 or greater)  I advised Jaquan of the risks of continuing to use tobacco, and I provided him with tobacco cessation educational materials in the After Visit Summary.     During this visit, I spent less than 3 minutes counseling the patient regarding tobacco cessation.    Understands disease processes and need for medications.   Understands reasons for urgent and emergent care.  Patient (& family) verbalized agreement for treatment plan.   Emotional support and active listening provided.  Patient provided time to verbalize feelings.    CHAVEZ #[id number] reviewed today and consistent.  Will refill prescribed controlled medication today.  Patient is aware they cannot receive narcotics from any other provider except if under care of pain management or speciality clinic.  Risk and benefits of medication use has been reviewed.  History and physical exam exhibit continued safe and appropriate use of controlled substances.  The patient is aware of the potential for addiction and dependence.  This patient has been made aware of the appropriate use of such medications, including potential risk of somnolence, limited ability to drive and / or work safely, and potential for overdose.    It has also been made clear that these medications are for use by this patient only, without concomitant use of alcohol or other substances unless prescribed/advised by medical provider.  Patient understands they may be subject to UDS and pill counts at random.      Patient considered to be moderate risk for addiction due to use of  Multiple controlled medications.  Patient understands and accepts these risks.      Goal of TX:  Patient will not have any negative side effects of meds.  Patient will have reduction in weight with use of Adipex.  He will have reduction in his knee joint pain with use of hydrocodone.      UDS requested per [facility] while patient in office today.  Buccal swab or blood sample will be obtained if patient is unable to provide specimen.   Appt given with his Endocrinology referral information.    RTC 1 month, sooner if needed.           This document has been electronically signed by:  BALDOMERO Thao FNP-C

## 2019-05-19 ENCOUNTER — HOSPITAL ENCOUNTER (EMERGENCY)
Facility: HOSPITAL | Age: 49
Discharge: HOME | End: 2019-05-19
Attending: EMERGENCY MEDICINE | Admitting: EMERGENCY MEDICINE

## 2019-05-19 DIAGNOSIS — G40.919 BREAKTHROUGH SEIZURE (HCC): Primary | ICD-10-CM

## 2019-05-19 LAB
ALBUMIN SERPL-MCNC: 3.94 G/DL (ref 3.5–5.2)
ALBUMIN/GLOB SERPL: 1.2 G/DL
ALP SERPL-CCNC: 71 U/L (ref 39–117)
ALT SERPL W P-5'-P-CCNC: 19 U/L (ref 1–41)
ANION GAP SERPL CALCULATED.3IONS-SCNC: 11.8 MMOL/L
AST SERPL-CCNC: 18 U/L (ref 1–40)
BASOPHILS # BLD AUTO: 0.01 10*3/MM3 (ref 0–0.2)
BASOPHILS NFR BLD AUTO: 0.2 % (ref 0–1.5)
BILIRUB SERPL-MCNC: 0.3 MG/DL (ref 0.2–1.2)
BILIRUB UR QL STRIP: NEGATIVE
BUN BLD-MCNC: 10 MG/DL (ref 6–20)
BUN/CREAT SERPL: 10.5 (ref 7–25)
CALCIUM SPEC-SCNC: 8.9 MG/DL (ref 8.6–10.5)
CHLORIDE SERPL-SCNC: 102 MMOL/L (ref 98–107)
CLARITY UR: CLEAR
CO2 SERPL-SCNC: 26.2 MMOL/L (ref 22–29)
COLOR UR: YELLOW
CREAT BLD-MCNC: 0.95 MG/DL (ref 0.76–1.27)
DEPRECATED RDW RBC AUTO: 45.6 FL (ref 37–54)
EOSINOPHIL # BLD AUTO: 0.06 10*3/MM3 (ref 0–0.4)
EOSINOPHIL NFR BLD AUTO: 0.9 % (ref 0.3–6.2)
ERYTHROCYTE [DISTWIDTH] IN BLOOD BY AUTOMATED COUNT: 13.2 % (ref 12.3–15.4)
GFR SERPL CREATININE-BSD FRML MDRD: 85 ML/MIN/1.73
GLOBULIN UR ELPH-MCNC: 3.2 GM/DL
GLUCOSE BLD-MCNC: 104 MG/DL (ref 65–99)
GLUCOSE UR STRIP-MCNC: NEGATIVE MG/DL
HCT VFR BLD AUTO: 40.6 % (ref 37.5–51)
HGB BLD-MCNC: 14.3 G/DL (ref 13–17.7)
HGB UR QL STRIP.AUTO: NEGATIVE
IMM GRANULOCYTES # BLD AUTO: 0.01 10*3/MM3 (ref 0–0.05)
IMM GRANULOCYTES NFR BLD AUTO: 0.2 % (ref 0–0.5)
KETONES UR QL STRIP: NEGATIVE
LEUKOCYTE ESTERASE UR QL STRIP.AUTO: NEGATIVE
LYMPHOCYTES # BLD AUTO: 1.96 10*3/MM3 (ref 0.7–3.1)
LYMPHOCYTES NFR BLD AUTO: 29.8 % (ref 19.6–45.3)
MCH RBC QN AUTO: 33.4 PG (ref 26.6–33)
MCHC RBC AUTO-ENTMCNC: 35.2 G/DL (ref 31.5–35.7)
MCV RBC AUTO: 94.9 FL (ref 79–97)
MONOCYTES # BLD AUTO: 0.88 10*3/MM3 (ref 0.1–0.9)
MONOCYTES NFR BLD AUTO: 13.4 % (ref 5–12)
NEUTROPHILS # BLD AUTO: 3.66 10*3/MM3 (ref 1.7–7)
NEUTROPHILS NFR BLD AUTO: 55.5 % (ref 42.7–76)
NITRITE UR QL STRIP: NEGATIVE
PH UR STRIP.AUTO: 7.5 [PH] (ref 5–8)
PHENYTOIN SERPL-MCNC: 8.9 MCG/ML (ref 10–20)
PLATELET # BLD AUTO: 165 10*3/MM3 (ref 140–450)
PMV BLD AUTO: 9.8 FL (ref 6–12)
POTASSIUM BLD-SCNC: 3.9 MMOL/L (ref 3.5–5.2)
PROT SERPL-MCNC: 7.1 G/DL (ref 6–8.5)
PROT UR QL STRIP: NEGATIVE
RBC # BLD AUTO: 4.28 10*6/MM3 (ref 4.14–5.8)
SODIUM BLD-SCNC: 140 MMOL/L (ref 136–145)
SP GR UR STRIP: 1.01 (ref 1–1.03)
UROBILINOGEN UR QL STRIP: NORMAL
WBC NRBC COR # BLD: 6.58 10*3/MM3 (ref 3.4–10.8)

## 2019-05-19 PROCEDURE — 80053 COMPREHEN METABOLIC PANEL: CPT | Performed by: EMERGENCY MEDICINE

## 2019-05-19 PROCEDURE — 81003 URINALYSIS AUTO W/O SCOPE: CPT | Performed by: EMERGENCY MEDICINE

## 2019-05-19 PROCEDURE — 80185 ASSAY OF PHENYTOIN TOTAL: CPT | Performed by: EMERGENCY MEDICINE

## 2019-05-19 PROCEDURE — 99285 EMERGENCY DEPT VISIT HI MDM: CPT

## 2019-05-19 PROCEDURE — 85025 COMPLETE CBC W/AUTO DIFF WBC: CPT | Performed by: EMERGENCY MEDICINE

## 2019-05-19 RX ORDER — PHENYTOIN SODIUM 100 MG/1
300 CAPSULE, EXTENDED RELEASE ORAL ONCE
Status: DISCONTINUED | OUTPATIENT
Start: 2019-05-19 | End: 2019-05-20 | Stop reason: HOSPADM

## 2019-05-19 RX ORDER — SODIUM CHLORIDE 0.9 % (FLUSH) 0.9 %
10 SYRINGE (ML) INJECTION AS NEEDED
Status: DISCONTINUED | OUTPATIENT
Start: 2019-05-19 | End: 2019-05-20 | Stop reason: HOSPADM

## 2019-05-20 ENCOUNTER — TELEPHONE (OUTPATIENT)
Dept: FAMILY MEDICINE CLINIC | Facility: CLINIC | Age: 49
End: 2019-05-20

## 2019-05-20 VITALS
HEART RATE: 85 BPM | SYSTOLIC BLOOD PRESSURE: 136 MMHG | DIASTOLIC BLOOD PRESSURE: 80 MMHG | WEIGHT: 200 LBS | TEMPERATURE: 98 F | RESPIRATION RATE: 18 BRPM | HEIGHT: 67 IN | BODY MASS INDEX: 31.39 KG/M2 | OXYGEN SATURATION: 100 %

## 2019-05-20 DIAGNOSIS — F41.9 ANXIETY: Primary | ICD-10-CM

## 2019-05-20 DIAGNOSIS — G40.909 SEIZURE DISORDER (HCC): Primary | ICD-10-CM

## 2019-05-20 DIAGNOSIS — F41.1 GENERALIZED ANXIETY DISORDER: ICD-10-CM

## 2019-05-20 NOTE — TELEPHONE ENCOUNTER
Patient called and stated that he had to go to the er last night due to his dilantin level being so low. He wanted to know what radha would like him to do. She said for the patient to take 3 100mg tablets of dilantin in the morning and 3 100mg dilantin in the evening for the next 2 days and have his lab rechecked on Wednesday. Pt is aware of this information.

## 2019-05-22 ENCOUNTER — OFFICE VISIT (OUTPATIENT)
Dept: SURGERY | Facility: CLINIC | Age: 49
End: 2019-05-22

## 2019-05-22 VITALS — BODY MASS INDEX: 31.39 KG/M2 | WEIGHT: 200 LBS | HEIGHT: 67 IN

## 2019-05-22 DIAGNOSIS — K21.9 GASTROESOPHAGEAL REFLUX DISEASE WITHOUT ESOPHAGITIS: ICD-10-CM

## 2019-05-22 DIAGNOSIS — K59.09 OTHER CONSTIPATION: Primary | ICD-10-CM

## 2019-05-22 PROCEDURE — 99242 OFF/OP CONSLTJ NEW/EST SF 20: CPT | Performed by: SURGERY

## 2019-05-22 PROCEDURE — 99406 BEHAV CHNG SMOKING 3-10 MIN: CPT | Performed by: SURGERY

## 2019-05-22 RX ORDER — AMOXICILLIN 250 MG
2 CAPSULE ORAL DAILY PRN
Qty: 30 TABLET | Refills: 1 | Status: SHIPPED | OUTPATIENT
Start: 2019-05-22 | End: 2019-08-07

## 2019-05-23 RX ORDER — PANTOPRAZOLE SODIUM 40 MG/1
TABLET, DELAYED RELEASE ORAL
Qty: 30 TABLET | Refills: 3 | Status: SHIPPED | OUTPATIENT
Start: 2019-05-23 | End: 2019-09-16 | Stop reason: SDUPTHER

## 2019-05-26 NOTE — PROGRESS NOTES
"Subjective   Jaquan Mckay is a 48 y.o. male is being seen for consultation today at the request of Tiera Valenzuela APRN    Jaquan Mckay is a 48 y.o. male With occasional bright red blood per rectum.  He has chronic constipation and states that after bowel movements he has drops in the bowl and streaking on tissue paper.  No family history of colon cancer.  He is on no chronic anticoagulation.  He smokes 1 pack/day.        Past Medical History:   Diagnosis Date   • Allergic    • Anxiety    • Arthritis    • Asthma    • Body piercing     REPORTS CYLICONE IN EARS   • Clotting disorder (CMS/HCC) 2004    had a knee surgery   • Depression    • DVT (deep venous thrombosis) (CMS/HCC)     RIGHT RIGHT KNEE AFTER SURGERY YEARS AGO IN 2001 OR 2004   • Gastric ulcer    • GERD (gastroesophageal reflux disease)    • H/O migraine    • H/O sleep apnea     REPORTS DIAGNOSED WITH SLEEP APNEA AND COULDN'T STAND TO WEAR THE MACHINE   • Headache    • Heart attack (CMS/HCC)     REPORTS \"LIGHT HEART ATTACK A LONG TIME AGO\"  \"EARLY 90'S\"   • History of seizures     REPORTS LAST EPISODE WAS AROUND 1995.   • Hostility    • Hyperlipidemia    • Hypertension    • Knee pain, acute     Left   • Low back pain    • Migraine    • MRSA (methicillin resistant Staphylococcus aureus)     REPORTS LAST TESTED + 2004. WAS TREATED HE REPORTS.  RIGHT ARM, RIGHT KNEE.   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    • Obesity    • Poor historian    • Carl Mountain spotted fever    • Seizures (CMS/HCC)    • Tattoo    • Wears glasses        Family History   Problem Relation Age of Onset   • Diabetes Mother    • Hypertension Mother    • Stroke Mother    • Diabetes Father    • Skin cancer Father    • Hypertension Father    • Heart attack Father    • Diabetes Brother    • Hypertension Brother    • Heart disease Maternal Aunt    • Heart disease Maternal Uncle    • Heart disease Paternal Aunt    • Heart disease Paternal Uncle    • Heart disease Maternal Grandmother    • Heart disease " Maternal Grandfather    • Heart disease Paternal Grandmother    • Heart disease Paternal Grandfather        Social History     Socioeconomic History   • Marital status:      Spouse name: Becca   • Number of children: 2   • Years of education: 12   • Highest education level: Not on file   Occupational History   • Occupation: DISABLED   Social Needs   • Financial resource strain: Somewhat hard   • Food insecurity:     Worry: Sometimes true     Inability: Sometimes true   • Transportation needs:     Medical: No     Non-medical: No   Tobacco Use   • Smoking status: Current Every Day Smoker     Packs/day: 2.00     Years: 17.00     Pack years: 34.00     Types: Cigars, Cigarettes     Start date: 5/5/2010   • Smokeless tobacco: Never Used   Substance and Sexual Activity   • Alcohol use: No   • Drug use: No   • Sexual activity: Defer   Lifestyle   • Physical activity:     Days per week: 0 days     Minutes per session: 0 min   • Stress: To some extent   Relationships   • Social connections:     Talks on phone: Once a week     Gets together: Once a week     Attends [employer] service: Never     Active member of club or organization: No     Attends meetings of clubs or organizations: Never     Relationship status:        Past Surgical History:   Procedure Laterality Date   • BACK SURGERY     • BRAIN SURGERY  1986    Tumor removal    • CARDIAC CATHETERIZATION N/A 9/28/2018    Procedure: Left Heart Cath;  Surgeon: Leandro Daily MD;  Location: [facility] CATH INVASIVE LOCATION;  Service: Cardiology   • CARDIAC SURGERY      CARDIAC CATH REPORTED AS 2 MONTHS AGO IN [place]. REPORTS NO STENTS PLACED.   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   HENT: Negative for sore throat and trouble swallowing.    Eyes: Negative for visual disturbance.   Respiratory: Negative for cough and shortness of breath.    Cardiovascular: Negative for chest pain and palpitations.   Gastrointestinal: Positive for anal bleeding. Negative for abdominal distention, abdominal pain, blood in stool, constipation, diarrhea, nausea and vomiting.   Endocrine: Negative for cold intolerance and heat intolerance.   Genitourinary: Negative for dysuria.   Musculoskeletal: Negative for joint swelling.   Skin: Negative for color change, rash and wound.   Allergic/Immunologic: Negative for immunocompromised state.   Neurological: Negative for dizziness, seizures, weakness and headaches.   Hematological: Negative for adenopathy. Does not bruise/bleed easily.   Psychiatric/Behavioral: Negative for agitation and confusion.         Ht 170.2 cm (67\")   Wt 90.7 kg (200 lb)   BMI 31.32 kg/m²   Objective   Physical Exam   Constitutional: He is oriented to person, place, and time. He appears well-developed.   HENT:   Head: Normocephalic and atraumatic.   Mouth/Throat: Mucous membranes are normal.   Eyes: Conjunctivae are normal. Pupils are equal, round, and reactive to light.   Neck: Neck supple. No JVD present. No tracheal deviation present. No thyromegaly present.   Cardiovascular: Normal rate and regular rhythm. Exam reveals no gallop and no friction rub.   No murmur heard.  Pulmonary/Chest: Effort normal and breath sounds normal.   Abdominal: Soft. He exhibits no distension. There is no splenomegaly or hepatomegaly. There is no tenderness. No hernia.   Musculoskeletal: Normal range of motion. He exhibits no deformity.   Neurological: He is alert and oriented to person, place, and " time.   Skin: Skin is warm and dry.   Psychiatric: He has a normal mood and affect.             Assessment   Jaquan was seen today for rectal bleeding and constipation.    Diagnoses and all orders for this visit:    Other constipation    Other orders  -     psyllium (METAMUCIL) 58.6 % powder; Take 1 packet by mouth Daily.  -     senna-docusate (PERICOLACE) 8.6-50 MG per tablet; Take 2 tablets by mouth Daily As Needed for Constipation.      Jaquan Mckay is a 48 y.o. male with chronic constipation and bright red blood per rectum.  He has notable hemorrhoids on examination and will be initiated on aggressive bowel regimen to improve his constipation.  If bleeding persists we will proceed with colonoscopy to evaluate for other source of constipation.    Patient's Body mass index is 31.32 kg/m². BMI is above normal parameters. Recommendations include: educational material.    I advised Jaquan of the risks of continuing to use tobacco, and I provided him with tobacco cessation educational materials in the After Visit Summary.     During this visit, I spent 3 minutes counseling the patient regarding tobacco cessation.

## 2019-05-29 ENCOUNTER — LAB (OUTPATIENT)
Dept: LAB | Facility: HOSPITAL | Age: 49
End: 2019-05-29

## 2019-05-29 DIAGNOSIS — G40.909 SEIZURE DISORDER (HCC): ICD-10-CM

## 2019-05-29 LAB — PHENYTOIN SERPL-MCNC: 13 MCG/ML (ref 10–20)

## 2019-05-29 PROCEDURE — 80185 ASSAY OF PHENYTOIN TOTAL: CPT

## 2019-05-29 PROCEDURE — 36415 COLL VENOUS BLD VENIPUNCTURE: CPT

## 2019-05-30 ENCOUNTER — HOSPITAL ENCOUNTER (EMERGENCY)
Facility: HOSPITAL | Age: 49
Discharge: HOME | End: 2019-05-31
Attending: EMERGENCY MEDICINE | Admitting: EMERGENCY MEDICINE

## 2019-05-30 DIAGNOSIS — S46.912A STRAIN OF LEFT ELBOW, INITIAL ENCOUNTER: ICD-10-CM

## 2019-05-30 DIAGNOSIS — S86.911A STRAIN OF KNEE, BILATERAL: Primary | ICD-10-CM

## 2019-05-30 DIAGNOSIS — S86.912A STRAIN OF KNEE, BILATERAL: Primary | ICD-10-CM

## 2019-05-30 DIAGNOSIS — S46.911A STRAIN OF RIGHT ELBOW, INITIAL ENCOUNTER: ICD-10-CM

## 2019-05-30 PROCEDURE — 73080 X-RAY EXAM OF ELBOW: CPT

## 2019-05-30 PROCEDURE — 99284 EMERGENCY DEPT VISIT MOD MDM: CPT

## 2019-05-31 ENCOUNTER — APPOINTMENT (OUTPATIENT)
Dept: GENERAL RADIOLOGY | Facility: HOSPITAL | Age: 49
End: 2019-05-31

## 2019-05-31 VITALS
RESPIRATION RATE: 18 BRPM | SYSTOLIC BLOOD PRESSURE: 132 MMHG | DIASTOLIC BLOOD PRESSURE: 78 MMHG | WEIGHT: 200 LBS | OXYGEN SATURATION: 99 % | TEMPERATURE: 98.7 F | HEIGHT: 67 IN | HEART RATE: 84 BPM | BODY MASS INDEX: 31.39 KG/M2

## 2019-05-31 PROCEDURE — 73080 X-RAY EXAM OF ELBOW: CPT

## 2019-05-31 PROCEDURE — 73564 X-RAY EXAM KNEE 4 OR MORE: CPT

## 2019-05-31 PROCEDURE — 73080 X-RAY EXAM OF ELBOW: CPT | Performed by: RADIOLOGY

## 2019-05-31 PROCEDURE — 73564 X-RAY EXAM KNEE 4 OR MORE: CPT | Performed by: RADIOLOGY

## 2019-05-31 RX ORDER — IBUPROFEN 200 MG
CAPSULE ORAL ONCE
Status: COMPLETED | OUTPATIENT
Start: 2019-05-31 | End: 2019-05-31

## 2019-05-31 RX ADMIN — Medication: at 01:15

## 2019-06-03 RX ORDER — PHENYTOIN SODIUM 100 MG/1
CAPSULE, EXTENDED RELEASE ORAL
Qty: 180 CAPSULE | Refills: 3 | Status: SHIPPED | OUTPATIENT
Start: 2019-06-03 | End: 2019-09-16 | Stop reason: SDUPTHER

## 2019-06-03 NOTE — TELEPHONE ENCOUNTER
----- Message from BALDOMERO Thao sent at 5/31/2019 11:57 AM EDT -----  His Dilantin level was normal on this check

## 2019-06-05 ENCOUNTER — OFFICE VISIT (OUTPATIENT)
Dept: FAMILY MEDICINE CLINIC | Facility: CLINIC | Age: 49
End: 2019-06-05

## 2019-06-05 VITALS
WEIGHT: 209 LBS | HEART RATE: 86 BPM | DIASTOLIC BLOOD PRESSURE: 80 MMHG | BODY MASS INDEX: 32.8 KG/M2 | SYSTOLIC BLOOD PRESSURE: 128 MMHG | TEMPERATURE: 97.5 F | HEIGHT: 67 IN | OXYGEN SATURATION: 97 %

## 2019-06-05 DIAGNOSIS — G89.29 CHRONIC BILATERAL LOW BACK PAIN WITH LEFT-SIDED SCIATICA: ICD-10-CM

## 2019-06-05 DIAGNOSIS — F41.1 GENERALIZED ANXIETY DISORDER: Primary | ICD-10-CM

## 2019-06-05 DIAGNOSIS — M94.262 CHONDROMALACIA OF LEFT KNEE: ICD-10-CM

## 2019-06-05 DIAGNOSIS — M54.42 CHRONIC BILATERAL LOW BACK PAIN WITH LEFT-SIDED SCIATICA: ICD-10-CM

## 2019-06-05 PROCEDURE — 99214 OFFICE O/P EST MOD 30 MIN: CPT | Performed by: NURSE PRACTITIONER

## 2019-06-05 RX ORDER — HYDROCODONE BITARTRATE AND ACETAMINOPHEN 10; 325 MG/1; MG/1
1 TABLET ORAL 2 TIMES DAILY PRN
Qty: 60 TABLET | Refills: 0 | Status: SHIPPED | OUTPATIENT
Start: 2019-06-05 | End: 2019-07-02 | Stop reason: SDUPTHER

## 2019-06-05 RX ORDER — BUSPIRONE HYDROCHLORIDE 5 MG/1
5 TABLET ORAL 3 TIMES DAILY
Qty: 60 TABLET | Refills: 0 | Status: SHIPPED | OUTPATIENT
Start: 2019-06-05 | End: 2019-06-19 | Stop reason: SDUPTHER

## 2019-06-05 RX ORDER — DOXEPIN HYDROCHLORIDE 25 MG/1
CAPSULE ORAL
Qty: 30 CAPSULE | Refills: 5 | Status: SHIPPED | OUTPATIENT
Start: 2019-06-05 | End: 2019-10-15

## 2019-06-05 NOTE — PROGRESS NOTES
"Subjective   Jaquan Mckay is a 48 y.o. male.     Chief Complaint   Patient presents with   • Constipation   • Knee Pain       History of Present Illness     Left knee pain-reports he did fall down 8 steps taking out his garbage at home.  He reports his knee felt like \"I got hit in the bend of the knee\".  He was seen at the ED.  He is presently living in an apartment with multiple steps.    Falls-continues to occur more frequently.  He would like a note to be considered for a single level apartment.  He reports 2 falls this month.  He does request a number for the \"knee doctor\".    Anxiety-continues under care of [facility] care.  He reports his counselor would like for him to be placed on a mild nerve medication.  He reports multiple stressors.  He reports he is not sleeping and is very anxious.  He is averaging \"about 2 hours of sleep per night.\"   He reports he worries about his health, finances, and \"other stuff\".  He reports he is reaching a point that he does not care if he lives.  No SI or HI but \"has tried it before\" when his wife passed > 2 years ago.  Not at goal.      The following portions of the patient's history were reviewed and updated as appropriate: allergies, current medications, past family history, past medical history, past social history, past surgical history and problem list.    Review of Systems   Constitutional: Positive for fatigue. Negative for appetite change and unexpected weight change.   HENT: Positive for sinus pressure and sinus pain. Negative for congestion, ear pain, nosebleeds, postnasal drip, rhinorrhea, sore throat, trouble swallowing and voice change.    Eyes: Negative for pain and visual disturbance.   Respiratory: Positive for cough and shortness of breath. Negative for wheezing.    Cardiovascular: Negative for chest pain, palpitations and leg swelling.   Gastrointestinal: Positive for abdominal pain, constipation and rectal pain. Negative for blood in stool and diarrhea. " "  Endocrine: Negative for cold intolerance and polydipsia.   Genitourinary: Negative for difficulty urinating, dysuria, flank pain and hematuria.   Musculoskeletal: Positive for arthralgias, back pain and myalgias. Negative for gait problem and joint swelling.   Skin: Negative for color change and rash.   Allergic/Immunologic: Negative.    Neurological: Positive for headaches. Negative for syncope and numbness.   Hematological: Negative.    Psychiatric/Behavioral: Positive for dysphoric mood and sleep disturbance. Negative for suicidal ideas. The patient is not nervous/anxious.    All other systems reviewed and are negative.      Objective     /80   Pulse 86   Temp 97.5 °F (36.4 °C) (Temporal)   Ht 170.2 cm (67\")   Wt 94.8 kg (209 lb)   SpO2 97%   BMI 32.73 kg/m²     Physical Exam   Constitutional: He is oriented to person, place, and time. He appears well-developed and well-nourished.   HENT:   Head: Normocephalic and atraumatic.   Right Ear: External ear and ear canal normal. Tympanic membrane is not erythematous.   Left Ear: External ear and ear canal normal. Tympanic membrane is not erythematous.   Nose: Mucosal edema and rhinorrhea present.   Mouth/Throat: No oropharyngeal exudate or posterior oropharyngeal erythema.   Eyes: Conjunctivae and EOM are normal. Pupils are equal, round, and reactive to light. No scleral icterus.   Neck: Normal range of motion. Neck supple. No JVD present. No thyromegaly present.   Cardiovascular: Normal rate, regular rhythm and normal heart sounds.   No murmur heard.  Pulmonary/Chest: Effort normal. No respiratory distress. He has no decreased breath sounds. He has no wheezes. He exhibits no tenderness.   Abdominal: Soft. Bowel sounds are normal. He exhibits no mass. There is no tenderness.   Musculoskeletal: He exhibits tenderness. He exhibits no edema.        Right elbow: Tenderness found. Medial epicondyle and olecranon process tenderness noted.        Left knee: He " exhibits decreased range of motion and swelling (mild). He exhibits no ecchymosis. Tenderness found. Medial joint line and lateral joint line tenderness noted.        Lumbar back: He exhibits decreased range of motion, tenderness, pain and spasm. He exhibits no swelling.        Right hand: He exhibits tenderness (3rd digit). He exhibits normal capillary refill.     Skin Integrity  -  His right foot skin is intact.His left foot skin is intact..  Lymphadenopathy:        Head (right side): No submandibular adenopathy present.        Head (left side): No submandibular adenopathy present.     He has no cervical adenopathy.   Neurological: He is alert and oriented to person, place, and time. He has normal reflexes. No cranial nerve deficit. He exhibits normal muscle tone. Coordination normal.   Skin: Skin is warm and dry. Capillary refill takes less than 2 seconds.   Varicosities noted on bilateral lower extremities.    Ecchymosis in varying degrees of color in size on BLE   Psychiatric: His speech is normal. Judgment and thought content normal. His mood appears anxious. He is not actively hallucinating. Cognition and memory are normal. He exhibits a depressed mood. He expresses no homicidal and no suicidal ideation. He exhibits normal recent memory and normal remote memory.   Flat affect He is attentive.   Vitals reviewed.      Assessment/Plan     Problem List Items Addressed This Visit        Nervous and Auditory    Chronic bilateral low back pain with left-sided sciatica    Relevant Medications    HYDROcodone-acetaminophen (NORCO)  MG per tablet       Musculoskeletal and Integument    Chondromalacia, knee    Relevant Medications    HYDROcodone-acetaminophen (NORCO)  MG per tablet       Other    Generalized anxiety disorder - Primary    Relevant Medications    doxepin (SINEquan) 25 MG capsule    busPIRone (BUSPAR) 5 MG tablet          Patient's Body mass index is 32.73 kg/m². BMI is above normal parameters.  Recommendations include: nutrition counseling.   (Normal BMI:  18.5-24.9, OW 25-29.9, Obesity 30 or greater)Understands disease processes and need for medications.  Understands reasons for urgent and emergent care.  Patient (& family) verbalized agreement for treatment plan.   Emotional support and active listening provided.  Patient provided time to verbalize feelings.    Reviewed #[id number] most recent labs with patient.  Discussed any abnormal findings.  Patient's questions answered.  CHAVEZ reviewed today and consistent.  Will refill prescribed controlled medication today.  Patient is aware they cannot receive narcotics from any other provider except if under care of pain management or speciality clinic.  Risk and benefits of medication use has been reviewed.  History and physical exam exhibit continued safe and appropriate use of controlled substances.  The patient is aware of the potential for addiction and dependence.  This patient has been made aware of the appropriate use of such medications, including potential risk of somnolence, limited ability to drive and / or work safely, and potential for overdose.    It has also been made clear that these medications are for use by this patient only, without concomitant use of alcohol or other substances unless prescribed/advised by medical provider.  Patient understands they may be subject to UDS and pill counts at random.      Patient considered to be low risk for addiction due to use of single controlled medications.  Patient understands and accepts these risks.  Patient need for medication will be reassessed at each visit.  Doses will be adjusted according to patient need and findings.    Goal of TX: Patient will not have any adverse reactions of medication.  Patient will have reduction in his back and knee pain symptoms with use of hydrocodone PRN.    Encourage patient to follow-up with his [facility] provider to see if he can increase the frequency of his therapy  sessions at this time.  Will increase doxepin to aid with insomnia.  Trial of BuSpar 5 mg for anxiety.      Phone number provided for [facility] for patient to make follow-up with the provider for his knee pain.    RTC 2-3 weeks for re-eval, sooner for worsening of symptoms.             This document has been electronically signed by:  BALDOMERO Thao, ROSIEC

## 2019-06-19 ENCOUNTER — OFFICE VISIT (OUTPATIENT)
Dept: FAMILY MEDICINE CLINIC | Facility: CLINIC | Age: 49
End: 2019-06-19

## 2019-06-19 VITALS
DIASTOLIC BLOOD PRESSURE: 80 MMHG | BODY MASS INDEX: 32.8 KG/M2 | HEIGHT: 67 IN | TEMPERATURE: 98.7 F | WEIGHT: 209 LBS | HEART RATE: 95 BPM | SYSTOLIC BLOOD PRESSURE: 140 MMHG | OXYGEN SATURATION: 98 %

## 2019-06-19 DIAGNOSIS — F41.9 ANXIETY: ICD-10-CM

## 2019-06-19 DIAGNOSIS — I10 ESSENTIAL HYPERTENSION: ICD-10-CM

## 2019-06-19 DIAGNOSIS — E16.2 HYPOGLYCEMIA: Primary | ICD-10-CM

## 2019-06-19 LAB
GLUCOSE BLDC GLUCOMTR-MCNC: 105 MG/DL (ref 70–130)
GLUCOSE BLDC GLUCOMTR-MCNC: 93 MG/DL (ref 70–130)

## 2019-06-19 PROCEDURE — 82962 GLUCOSE BLOOD TEST: CPT | Performed by: NURSE PRACTITIONER

## 2019-06-19 PROCEDURE — 99284 EMERGENCY DEPT VISIT MOD MDM: CPT

## 2019-06-19 PROCEDURE — 36416 COLLJ CAPILLARY BLOOD SPEC: CPT | Performed by: NURSE PRACTITIONER

## 2019-06-19 PROCEDURE — 82962 GLUCOSE BLOOD TEST: CPT

## 2019-06-19 PROCEDURE — 93010 ELECTROCARDIOGRAM REPORT: CPT | Performed by: INTERNAL MEDICINE

## 2019-06-19 PROCEDURE — 93005 ELECTROCARDIOGRAM TRACING: CPT | Performed by: FAMILY MEDICINE

## 2019-06-19 PROCEDURE — 99214 OFFICE O/P EST MOD 30 MIN: CPT | Performed by: NURSE PRACTITIONER

## 2019-06-19 RX ORDER — BUSPIRONE HYDROCHLORIDE 5 MG/1
5 TABLET ORAL 3 TIMES DAILY
Qty: 90 TABLET | Refills: 2 | Status: SHIPPED | OUTPATIENT
Start: 2019-06-19 | End: 2020-02-13 | Stop reason: DRUGHIGH

## 2019-06-19 RX ORDER — SODIUM CHLORIDE 0.9 % (FLUSH) 0.9 %
10 SYRINGE (ML) INJECTION AS NEEDED
Status: DISCONTINUED | OUTPATIENT
Start: 2019-06-19 | End: 2019-06-20 | Stop reason: HOSPADM

## 2019-06-19 NOTE — PROGRESS NOTES
"Subjective   Jaquan Mckay is a 48 y.o. male.     Chief Complaint   Patient presents with   • Anxiety       History of Present Illness     Anxiety-chronic and ongoing.  He has been started on BuSpar.  He reports he has tolerated the medication well.  He does feel it has helped his anxiety overall.    Shaking-accucheck of 105.  He reports he has felt low for several days as he has been fatigued and \"feeling like sleeping\".  He reports he is only eating one meal per day.  Ongoing.    HTN-mild elevation today.  He has taken his meds but is stressed.  He denies any CP or SOA.  No Vision changes.  Ongoing.    Asthma-doing well.  No acute concerns.  No exacerbation.  He reports he did have to use his albuterol \"the other day\" during exercise.  Symptoms improved with use.  He is also taking Symbicort BID as directed.  He is followed annually by BALDOMERO Anderson.  He is also on Montelukast 10 mg QHS.  Ongoing.    Seizure disorder-tolerating medication increase well.  He is taking 3 capsules BID.  His last Dilantin level was WNL.  He has not had any seizure activity.  Ongoing.    The following portions of the patient's history were reviewed and updated as appropriate: allergies, current medications, past family history, past medical history, past social history, past surgical history and problem list.    Review of Systems   Constitutional: Positive for fatigue. Negative for appetite change and unexpected weight change.   HENT: Positive for sinus pressure and sinus pain. Negative for congestion, ear pain, nosebleeds, postnasal drip, rhinorrhea, sore throat, trouble swallowing and voice change.    Eyes: Negative for pain and visual disturbance.   Respiratory: Positive for cough and shortness of breath. Negative for wheezing.    Cardiovascular: Negative for chest pain, palpitations and leg swelling.   Gastrointestinal: Positive for abdominal pain, constipation and rectal pain. Negative for blood in stool and diarrhea. " "  Endocrine: Negative for cold intolerance and polydipsia.   Genitourinary: Negative for difficulty urinating, dysuria, flank pain and hematuria.   Musculoskeletal: Positive for arthralgias, back pain and myalgias. Negative for gait problem and joint swelling.   Skin: Negative for color change and rash.   Allergic/Immunologic: Negative.    Neurological: Positive for headaches. Negative for syncope and numbness.   Hematological: Negative.    Psychiatric/Behavioral: Positive for dysphoric mood and sleep disturbance. Negative for suicidal ideas. The patient is not nervous/anxious.    All other systems reviewed and are negative.      Objective     /80   Pulse 95   Temp 98.7 °F (37.1 °C) (Temporal)   Ht 170.2 cm (67\")   Wt 94.8 kg (209 lb)   SpO2 98%   BMI 32.73 kg/m²     Physical Exam   Constitutional: He is oriented to person, place, and time. He appears well-developed and well-nourished. No distress.   HENT:   Head: Normocephalic and atraumatic.   Right Ear: External ear and ear canal normal. Tympanic membrane is not erythematous.   Left Ear: External ear and ear canal normal. Tympanic membrane is not erythematous.   Nose: Mucosal edema and rhinorrhea present.   Mouth/Throat: Oropharynx is clear and moist. No oropharyngeal exudate or posterior oropharyngeal erythema.   Eyes: Conjunctivae and EOM are normal. Pupils are equal, round, and reactive to light. No scleral icterus.   Neck: Normal range of motion. Neck supple. No JVD present. No thyromegaly present.   Cardiovascular: Normal rate, regular rhythm and normal heart sounds.   No murmur heard.  Pulmonary/Chest: Effort normal and breath sounds normal. No respiratory distress. He has no decreased breath sounds. He has no wheezes. He exhibits no tenderness.   Abdominal: Soft. Bowel sounds are normal. He exhibits no mass. There is no tenderness.   Musculoskeletal: He exhibits tenderness. He exhibits no edema.        Right elbow: Tenderness found. Medial " epicondyle and olecranon process tenderness noted.        Left knee: He exhibits decreased range of motion and swelling (mild). He exhibits no ecchymosis. Tenderness found. Medial joint line and lateral joint line tenderness noted.        Lumbar back: He exhibits decreased range of motion, tenderness, pain and spasm. He exhibits no swelling.        Right hand: He exhibits tenderness (3rd digit). He exhibits normal capillary refill.     Skin Integrity  -  His right foot skin is intact.His left foot skin is intact..  Lymphadenopathy:        Head (right side): No submandibular adenopathy present.        Head (left side): No submandibular adenopathy present.     He has no cervical adenopathy.   Neurological: He is alert and oriented to person, place, and time. He has normal reflexes. No cranial nerve deficit. He exhibits normal muscle tone. Coordination normal.   Skin: Skin is warm and dry. Capillary refill takes less than 2 seconds. He is not diaphoretic.   Varicosities noted on bilateral lower extremities.    Ecchymosis in varying degrees of color in size on BLE   Psychiatric: His speech is normal. Judgment and thought content normal. His mood appears anxious. He is not actively hallucinating. Cognition and memory are normal. He exhibits a depressed mood. He expresses no homicidal and no suicidal ideation. He exhibits normal recent memory and normal remote memory.   Flat affect He is attentive.   Vitals reviewed.      Assessment/Plan     Problem List Items Addressed This Visit        Cardiovascular and Mediastinum    Hypertension    Current Assessment & Plan     Mild elevation today.  Continue Lisinopril as ordered.  Ambulatory BP monitoring either at home or random community checks.  Patient to report continued elevations >140/90.  Patient may come by office for checks if needed.             Other    Anxiety    Current Assessment & Plan     Tolerating BuSpar well.  Will refill today.  Continue to take PRN for  symptoms.  Stress reduction advised (examples:  make time for self, Reading, Listening to music, Exercise, keeping a journal, venting to a confidant, etc.)           Other Visit Diagnoses     Hypoglycemia    -  Primary    Treated symptomatically in the office    Relevant Orders    POC Glucose (Completed)          Patient's Body mass index is 32.73 kg/m². BMI is above normal parameters. Recommendations include: nutrition counseling.   (Normal BMI:  18.5-24.9, OW 25-29.9, Obesity 30 or greater)    POC Glucose treated with snack in the office.  Patient symptoms improved.      Understands disease processes and need for medications.  Understands reasons for urgent and emergent care.  Patient (& family) verbalized agreement for treatment plan.   Emotional support and active listening provided.  Patient provided time to verbalize feelings.    [facility] #[id number] reviewed today and consistent.  Patient is not due for a refill today.  Patient is aware they cannot receive narcotics from any other provider except if under care of pain management or speciality clinic.  Risk and benefits of medication use has been reviewed.  History and physical exam exhibit continued safe and appropriate use of controlled substances.  The patient is aware of the potential for addiction and dependence.  This patient has been made aware of the appropriate use of such medications, including potential risk of somnolence, limited ability to drive and / or work safely, and potential for overdose.    It has also been made clear that these medications are for use by this patient only, without concomitant use of alcohol or other substances unless prescribed/advised by medical provider.  Patient understands they may be subject to UDS and pill counts at random.      Patient considered to be low risk for addiction due to use of single controlled medications.  Patient understands and accepts these risks.  Patient need for medication will be reassessed at each  visit.  Doses will be adjusted according to patient need and findings.    Goal of TX: Patient will not have any adverse reactions of medication.  Patient will have decrease in back and knee pain symptoms with use of Norco BID PRN,      RTC 1 month, sooner if needed for problems or concerns          This document has been electronically signed by:  BALDOMERO Thao, FNP-C

## 2019-06-19 NOTE — ASSESSMENT & PLAN NOTE
Tolerating BuSpar well.  Will refill today.  Continue to take PRN for symptoms.  Stress reduction advised (examples:  make time for self, Reading, Listening to music, Exercise, keeping a journal, venting to a confidant, etc.)

## 2019-06-20 ENCOUNTER — HOSPITAL ENCOUNTER (EMERGENCY)
Facility: HOSPITAL | Age: 49
Discharge: HOME | End: 2019-06-20
Attending: FAMILY MEDICINE | Admitting: FAMILY MEDICINE

## 2019-06-20 ENCOUNTER — APPOINTMENT (OUTPATIENT)
Dept: GENERAL RADIOLOGY | Facility: HOSPITAL | Age: 49
End: 2019-06-20

## 2019-06-20 VITALS
OXYGEN SATURATION: 99 % | SYSTOLIC BLOOD PRESSURE: 132 MMHG | HEART RATE: 78 BPM | DIASTOLIC BLOOD PRESSURE: 78 MMHG | RESPIRATION RATE: 18 BRPM | BODY MASS INDEX: 32.8 KG/M2 | HEIGHT: 67 IN | WEIGHT: 209 LBS | TEMPERATURE: 98 F

## 2019-06-20 DIAGNOSIS — R07.9 CHEST PAIN, UNSPECIFIED TYPE: Primary | ICD-10-CM

## 2019-06-20 LAB
ALBUMIN SERPL-MCNC: 4.48 G/DL (ref 3.5–5.2)
ALBUMIN/GLOB SERPL: 1.3 G/DL
ALP SERPL-CCNC: 89 U/L (ref 39–117)
ALT SERPL W P-5'-P-CCNC: 24 U/L (ref 1–41)
ANION GAP SERPL CALCULATED.3IONS-SCNC: 14.5 MMOL/L
AST SERPL-CCNC: 26 U/L (ref 1–40)
BASOPHILS # BLD AUTO: 0.01 10*3/MM3 (ref 0–0.2)
BASOPHILS NFR BLD AUTO: 0.1 % (ref 0–1.5)
BILIRUB SERPL-MCNC: 0.4 MG/DL (ref 0.2–1.2)
BUN BLD-MCNC: 12 MG/DL (ref 6–20)
BUN/CREAT SERPL: 11.4 (ref 7–25)
CALCIUM SPEC-SCNC: 9.5 MG/DL (ref 8.6–10.5)
CHLORIDE SERPL-SCNC: 101 MMOL/L (ref 98–107)
CK MB SERPL-CCNC: 2.36 NG/ML
CK SERPL-CCNC: 153 U/L (ref 20–200)
CO2 SERPL-SCNC: 23.5 MMOL/L (ref 22–29)
CREAT BLD-MCNC: 1.05 MG/DL (ref 0.76–1.27)
DEPRECATED RDW RBC AUTO: 42.2 FL (ref 37–54)
EOSINOPHIL # BLD AUTO: 0.04 10*3/MM3 (ref 0–0.4)
EOSINOPHIL NFR BLD AUTO: 0.4 % (ref 0.3–6.2)
ERYTHROCYTE [DISTWIDTH] IN BLOOD BY AUTOMATED COUNT: 12.7 % (ref 12.3–15.4)
GFR SERPL CREATININE-BSD FRML MDRD: 76 ML/MIN/1.73
GLOBULIN UR ELPH-MCNC: 3.3 GM/DL
GLUCOSE BLD-MCNC: 117 MG/DL (ref 65–99)
GLUCOSE BLDC GLUCOMTR-MCNC: 86 MG/DL (ref 70–130)
HCT VFR BLD AUTO: 45.5 % (ref 37.5–51)
HGB BLD-MCNC: 16 G/DL (ref 13–17.7)
HOLD SPECIMEN: NORMAL
HOLD SPECIMEN: NORMAL
IMM GRANULOCYTES # BLD AUTO: 0.01 10*3/MM3 (ref 0–0.05)
IMM GRANULOCYTES NFR BLD AUTO: 0.1 % (ref 0–0.5)
LYMPHOCYTES # BLD AUTO: 1.99 10*3/MM3 (ref 0.7–3.1)
LYMPHOCYTES NFR BLD AUTO: 21.4 % (ref 19.6–45.3)
MCH RBC QN AUTO: 32.9 PG (ref 26.6–33)
MCHC RBC AUTO-ENTMCNC: 35.2 G/DL (ref 31.5–35.7)
MCV RBC AUTO: 93.4 FL (ref 79–97)
MONOCYTES # BLD AUTO: 1.06 10*3/MM3 (ref 0.1–0.9)
MONOCYTES NFR BLD AUTO: 11.4 % (ref 5–12)
NEUTROPHILS # BLD AUTO: 6.2 10*3/MM3 (ref 1.7–7)
NEUTROPHILS NFR BLD AUTO: 66.6 % (ref 42.7–76)
PLATELET # BLD AUTO: 197 10*3/MM3 (ref 140–450)
PMV BLD AUTO: 9.4 FL (ref 6–12)
POTASSIUM BLD-SCNC: 3.4 MMOL/L (ref 3.5–5.2)
PROT SERPL-MCNC: 7.8 G/DL (ref 6–8.5)
RBC # BLD AUTO: 4.87 10*6/MM3 (ref 4.14–5.8)
SODIUM BLD-SCNC: 139 MMOL/L (ref 136–145)
TROPONIN T SERPL-MCNC: <0.01 NG/ML (ref 0–0.03)
TROPONIN T SERPL-MCNC: <0.01 NG/ML (ref 0–0.03)
WBC NRBC COR # BLD: 9.31 10*3/MM3 (ref 3.4–10.8)
WHOLE BLOOD HOLD SPECIMEN: NORMAL
WHOLE BLOOD HOLD SPECIMEN: NORMAL

## 2019-06-20 PROCEDURE — 80053 COMPREHEN METABOLIC PANEL: CPT | Performed by: FAMILY MEDICINE

## 2019-06-20 PROCEDURE — 82550 ASSAY OF CK (CPK): CPT | Performed by: PHYSICIAN ASSISTANT

## 2019-06-20 PROCEDURE — 82553 CREATINE MB FRACTION: CPT | Performed by: PHYSICIAN ASSISTANT

## 2019-06-20 PROCEDURE — 71046 X-RAY EXAM CHEST 2 VIEWS: CPT

## 2019-06-20 PROCEDURE — 85025 COMPLETE CBC W/AUTO DIFF WBC: CPT | Performed by: FAMILY MEDICINE

## 2019-06-20 PROCEDURE — 84484 ASSAY OF TROPONIN QUANT: CPT | Performed by: FAMILY MEDICINE

## 2019-06-20 PROCEDURE — 71046 X-RAY EXAM CHEST 2 VIEWS: CPT | Performed by: RADIOLOGY

## 2019-06-20 PROCEDURE — 82962 GLUCOSE BLOOD TEST: CPT

## 2019-06-20 RX ORDER — ASPIRIN 81 MG/1
324 TABLET, CHEWABLE ORAL ONCE
Status: COMPLETED | OUTPATIENT
Start: 2019-06-20 | End: 2019-06-20

## 2019-06-20 RX ADMIN — ASPIRIN 324 MG: 81 TABLET, CHEWABLE ORAL at 00:49

## 2019-06-21 ENCOUNTER — TRANSCRIBE ORDERS (OUTPATIENT)
Dept: ADMINISTRATIVE | Facility: HOSPITAL | Age: 49
End: 2019-06-21

## 2019-06-21 DIAGNOSIS — R07.9 CHEST PAIN, UNSPECIFIED TYPE: Primary | ICD-10-CM

## 2019-07-02 ENCOUNTER — OFFICE VISIT (OUTPATIENT)
Dept: FAMILY MEDICINE CLINIC | Facility: CLINIC | Age: 49
End: 2019-07-02

## 2019-07-02 VITALS
HEART RATE: 100 BPM | OXYGEN SATURATION: 99 % | DIASTOLIC BLOOD PRESSURE: 88 MMHG | SYSTOLIC BLOOD PRESSURE: 120 MMHG | HEIGHT: 67 IN | BODY MASS INDEX: 32.73 KG/M2 | TEMPERATURE: 97.6 F

## 2019-07-02 DIAGNOSIS — R07.89 OTHER CHEST PAIN: Primary | ICD-10-CM

## 2019-07-02 DIAGNOSIS — M54.42 CHRONIC BILATERAL LOW BACK PAIN WITH LEFT-SIDED SCIATICA: ICD-10-CM

## 2019-07-02 DIAGNOSIS — J45.31 MILD PERSISTENT ASTHMA WITH ACUTE EXACERBATION: ICD-10-CM

## 2019-07-02 DIAGNOSIS — M94.262 CHONDROMALACIA OF LEFT KNEE: ICD-10-CM

## 2019-07-02 DIAGNOSIS — G89.29 CHRONIC BILATERAL LOW BACK PAIN WITH LEFT-SIDED SCIATICA: ICD-10-CM

## 2019-07-02 PROCEDURE — 99214 OFFICE O/P EST MOD 30 MIN: CPT | Performed by: NURSE PRACTITIONER

## 2019-07-02 PROCEDURE — 93005 ELECTROCARDIOGRAM TRACING: CPT | Performed by: NURSE PRACTITIONER

## 2019-07-02 RX ORDER — CLARITHROMYCIN 500 MG/1
500 TABLET, COATED ORAL EVERY 12 HOURS SCHEDULED
Qty: 20 TABLET | Refills: 0 | Status: SHIPPED | OUTPATIENT
Start: 2019-07-02 | End: 2019-07-12

## 2019-07-02 RX ORDER — HYDROCODONE BITARTRATE AND ACETAMINOPHEN 10; 325 MG/1; MG/1
1 TABLET ORAL 2 TIMES DAILY PRN
Qty: 60 TABLET | Refills: 0 | Status: SHIPPED | OUTPATIENT
Start: 2019-07-02 | End: 2019-07-16 | Stop reason: SDUPTHER

## 2019-07-02 NOTE — PATIENT INSTRUCTIONS
Exercising to Lose Weight  Exercising can help you to lose weight. In order to lose weight through exercise, you need to do vigorous-intensity exercise. You can tell that you are exercising with vigorous intensity if you are breathing very hard and fast and cannot hold a conversation while exercising.  Moderate-intensity exercise helps to maintain your current weight. You can tell that you are exercising at a moderate level if you have a higher heart rate and faster breathing, but you are still able to hold a conversation.  How often should I exercise?  Choose an activity that you enjoy and set realistic goals. Your health care provider can help you to make an activity plan that works for you. Exercise regularly as directed by your health care provider. This may include:  · Doing resistance training twice each week, such as:  ? Push-ups.  ? Sit-ups.  ? Lifting weights.  ? Using resistance bands.  · Doing a given intensity of exercise for a given amount of time. Choose from these options:  ? 150 minutes of moderate-intensity exercise every week.  ? 75 minutes of vigorous-intensity exercise every week.  ? A mix of moderate-intensity and vigorous-intensity exercise every week.    Children, pregnant women, people who are out of shape, people who are overweight, and older adults may need to consult a health care provider for individual recommendations. If you have any sort of medical condition, be sure to consult your health care provider before starting a new exercise program.  What are some activities that can help me to lose weight?  · Walking at a rate of at least 4.5 miles an hour.  · Jogging or running at a rate of 5 miles per hour.  · Biking at a rate of at least 10 miles per hour.  · Lap swimming.  · Roller-skating or in-line skating.  · Cross-country skiing.  · Vigorous competitive sports, such as football, basketball, and soccer.  · Jumping rope.  · Aerobic dancing.  How can I be more active in my day-to-day  activities?  · Use the stairs instead of the elevator.  · Take a walk during your lunch break.  · If you drive, park your car farther away from work or school.  · If you take public transportation, get off one stop early and walk the rest of the way.  · Make all of your phone calls while standing up and walking around.  · Get up, stretch, and walk around every 30 minutes throughout the day.  What guidelines should I follow while exercising?  · Do not exercise so much that you hurt yourself, feel dizzy, or get very short of breath.  · Consult your health care provider prior to starting a new exercise program.  · Wear comfortable clothes and shoes with good support.  · Drink plenty of water while you exercise to prevent dehydration or heat stroke. Body water is lost during exercise and must be replaced.  · Work out until you breathe faster and your heart beats faster.  This information is not intended to replace advice given to you by your health care provider. Make sure you discuss any questions you have with your health care provider.  Document Released: 01/20/2012 Document Revised: 05/25/2017 Document Reviewed: 05/21/2015  [employer] Patient Education © 2019 [employer]

## 2019-07-02 NOTE — PROGRESS NOTES
"Subjective   Jaquan Mckay is a 48 y.o. male.     Chief Complaint   Patient presents with   • Anxiety   • Knee pain       History of Present Illness     CP-that began last night.  He reports some mild SOA.  HA is present.  Reports \"smothering\".  CP feels like it is left sternal and \"going thru me\".  He reports radiating from his back upward into his neck.  He reports some diaphoresis this morning.  He denies any lifting or tugging.  No Falls.  Reports began \"all at once\".  He reports \"feels like a knife\".   He reports pain with deep inspiration and with cough.  He reports he has been having to use his rescue inhaler more frequently.   Back/Joint Pain-chronic and ongoing.  Has not gone to PT yet for his knee pain.  He is presently on Norco 10 mg BID.  He denies any negative side effects.      The following portions of the patient's history were reviewed and updated as appropriate: allergies, current medications, past family history, past medical history, past social history, past surgical history and problem list.    Review of Systems   Constitutional: Positive for fatigue. Negative for appetite change and unexpected weight change.   HENT: Positive for sinus pressure and sinus pain. Negative for congestion, ear pain, nosebleeds, postnasal drip, rhinorrhea, sore throat, trouble swallowing and voice change.    Eyes: Negative for pain and visual disturbance.   Respiratory: Positive for cough, chest tightness and shortness of breath. Negative for wheezing.    Cardiovascular: Positive for chest pain. Negative for palpitations and leg swelling.   Gastrointestinal: Positive for abdominal pain, constipation and rectal pain. Negative for blood in stool and diarrhea.   Endocrine: Negative for cold intolerance and polydipsia.   Genitourinary: Negative for difficulty urinating, dysuria, flank pain and hematuria.   Musculoskeletal: Positive for arthralgias, back pain and myalgias. Negative for gait problem and joint swelling. " "  Skin: Negative for color change and rash.   Allergic/Immunologic: Negative.    Neurological: Positive for headaches. Negative for syncope and numbness.   Hematological: Negative.    Psychiatric/Behavioral: Positive for dysphoric mood and sleep disturbance. Negative for suicidal ideas. The patient is not nervous/anxious.    All other systems reviewed and are negative.      Objective     /88   Pulse 100   Temp 97.6 °F (36.4 °C) (Temporal)   Ht 170.2 cm (67\")   SpO2 99%   BMI 32.73 kg/m²     Physical Exam   Constitutional: He is oriented to person, place, and time. He appears well-developed and well-nourished. No distress.   HENT:   Head: Normocephalic and atraumatic.   Right Ear: External ear and ear canal normal. Tympanic membrane is not erythematous.   Left Ear: External ear and ear canal normal. Tympanic membrane is not erythematous.   Nose: Mucosal edema and rhinorrhea present.   Mouth/Throat: Oropharynx is clear and moist. No oropharyngeal exudate or posterior oropharyngeal erythema.   Eyes: Conjunctivae and EOM are normal. Pupils are equal, round, and reactive to light. No scleral icterus.   Neck: Normal range of motion. Neck supple. No JVD present. No thyromegaly present.   Cardiovascular: Normal rate, regular rhythm and normal heart sounds.   No murmur heard.  Pulmonary/Chest: Effort normal and breath sounds normal. No respiratory distress. He has no decreased breath sounds. He has no wheezes. He exhibits no tenderness.   Abdominal: Soft. Bowel sounds are normal. He exhibits no mass. There is no tenderness.   Musculoskeletal: He exhibits tenderness. He exhibits no edema.        Right elbow: Tenderness found. Medial epicondyle and olecranon process tenderness noted.        Left knee: He exhibits decreased range of motion and swelling (mild). He exhibits no ecchymosis. Tenderness found. Medial joint line and lateral joint line tenderness noted.        Lumbar back: He exhibits decreased range of " motion, tenderness, pain and spasm. He exhibits no swelling.        Right hand: He exhibits tenderness (3rd digit). He exhibits normal capillary refill.     Skin Integrity  -  His right foot skin is intact.His left foot skin is intact..  Lymphadenopathy:        Head (right side): No submandibular adenopathy present.        Head (left side): No submandibular adenopathy present.     He has no cervical adenopathy.   Neurological: He is alert and oriented to person, place, and time. He has normal reflexes. No cranial nerve deficit. He exhibits normal muscle tone. Coordination normal.   Skin: Skin is warm and dry. Capillary refill takes less than 2 seconds. He is not diaphoretic.   Varicosities noted on bilateral lower extremities.    Ecchymosis in varying degrees of color in size on BLE   Psychiatric: His speech is normal. Judgment and thought content normal. His mood appears anxious. He is not actively hallucinating. Cognition and memory are normal. He exhibits a depressed mood. He expresses no homicidal and no suicidal ideation. He exhibits normal recent memory and normal remote memory.   Flat affect He is attentive.   Vitals reviewed.      Assessment/Plan     Problem List Items Addressed This Visit        Respiratory    Mild persistent asthma with acute exacerbation    Relevant Medications    clarithromycin (BIAXIN) 500 MG tablet       Nervous and Auditory    Chronic bilateral low back pain with left-sided sciatica    Relevant Medications    HYDROcodone-acetaminophen (NORCO)  MG per tablet    Chest pain - Primary    Relevant Orders    ECG 12 Lead       Musculoskeletal and Integument    Chondromalacia, knee    Relevant Medications    HYDROcodone-acetaminophen (NORCO)  MG per tablet          Patient's Body mass index is 32.73 kg/m². BMI is above normal parameters. Recommendations include: exercise counseling.   (Normal BMI:  18.5-24.9, OW 25-29.9, Obesity 30 or greater)    EKG read per Dr Ballesteros-NSR  Normal ECG.    Understands disease processes and need for medications.  Understands reasons for urgent and emergent care.  Patient (& family) verbalized agreement for treatment plan.   Emotional support and active listening provided.  Patient provided time to verbalize feelings.    CHAVEZ #[id number] reviewed today and consistent.  Will refill prescribed controlled medication today.  Patient is aware they cannot receive narcotics from any other provider except if under care of pain management or speciality clinic.  Risk and benefits of medication use has been reviewed.  History and physical exam exhibit continued safe and appropriate use of controlled substances.  The patient is aware of the potential for addiction and dependence.  This patient has been made aware of the appropriate use of such medications, including potential risk of somnolence, limited ability to drive and / or work safely, and potential for overdose.    It has also been made clear that these medications are for use by this patient only, without concomitant use of alcohol or other substances unless prescribed/advised by medical provider.  Patient understands they may be subject to UDS and pill counts at random.      Patient considered to be low risk for addiction due to use of single controlled medications.  Patient understands and accepts these risks.  Patient need for medication will be reassessed at each visit.  Doses will be adjusted according to patient need and findings.    Goal of TX: Patient will not have any adverse reactions of medication.  Patient will have reduction in his chronic joint pain with use of Norco PRN.    Request to have pharmacy change to [facility] in [place] due to closer to his home.      Instructed to complete all of antibiotics for acute illness.  Increase PO fluids, avoid/limit caffeine.  Do not save any of the meds for later use.  Rest PRN  Steam expectoration, warm compress to sinus, Saline PRN for moisture    Order  given for CXR to be obtained [facility].  Will call patient with results.    RTC 1 month, sooner if needed.           This document has been electronically signed by:  BALDOMERO Thao FNP-C Dragon disclaimer:  Much of this encounter note is an electronic transcription/translation of spoken language to printed text. The electronic translation of spoken language may permit erroneous, or at times, nonsensical words or phrases to be inadvertently transcribed; Although I have reviewed the note for such errors, some may still exist.

## 2019-07-03 DIAGNOSIS — J45.31 MILD PERSISTENT ASTHMA WITH ACUTE EXACERBATION: Primary | ICD-10-CM

## 2019-07-03 RX ORDER — ALBUTEROL SULFATE 2.5 MG/3ML
2.5 SOLUTION RESPIRATORY (INHALATION) EVERY 4 HOURS PRN
Qty: 180 VIAL | Refills: 5 | Status: SHIPPED | OUTPATIENT
Start: 2019-07-03 | End: 2020-04-20 | Stop reason: ALTCHOICE

## 2019-07-03 RX ORDER — NEBULIZER ACCESSORIES
1 KIT MISCELLANEOUS TAKE AS DIRECTED
Qty: 1 EACH | Refills: 0 | Status: ON HOLD | OUTPATIENT
Start: 2019-07-03 | End: 2019-09-25

## 2019-07-16 ENCOUNTER — OFFICE VISIT (OUTPATIENT)
Dept: FAMILY MEDICINE CLINIC | Facility: CLINIC | Age: 49
End: 2019-07-16

## 2019-07-16 VITALS
BODY MASS INDEX: 33.12 KG/M2 | WEIGHT: 211 LBS | SYSTOLIC BLOOD PRESSURE: 130 MMHG | TEMPERATURE: 97.7 F | HEART RATE: 101 BPM | OXYGEN SATURATION: 98 % | DIASTOLIC BLOOD PRESSURE: 80 MMHG | HEIGHT: 67 IN

## 2019-07-16 DIAGNOSIS — E66.09 CLASS 1 OBESITY DUE TO EXCESS CALORIES WITH SERIOUS COMORBIDITY AND BODY MASS INDEX (BMI) OF 33.0 TO 33.9 IN ADULT: ICD-10-CM

## 2019-07-16 DIAGNOSIS — M94.262 CHONDROMALACIA OF LEFT KNEE: ICD-10-CM

## 2019-07-16 DIAGNOSIS — M25.521 CHRONIC ELBOW PAIN, RIGHT: Primary | ICD-10-CM

## 2019-07-16 DIAGNOSIS — M54.42 CHRONIC BILATERAL LOW BACK PAIN WITH LEFT-SIDED SCIATICA: ICD-10-CM

## 2019-07-16 DIAGNOSIS — G89.29 CHRONIC BILATERAL LOW BACK PAIN WITH LEFT-SIDED SCIATICA: ICD-10-CM

## 2019-07-16 DIAGNOSIS — G89.29 CHRONIC ELBOW PAIN, RIGHT: Primary | ICD-10-CM

## 2019-07-16 PROBLEM — E66.811 CLASS 1 OBESITY DUE TO EXCESS CALORIES WITH SERIOUS COMORBIDITY AND BODY MASS INDEX (BMI) OF 33.0 TO 33.9 IN ADULT: Status: ACTIVE | Noted: 2019-07-16

## 2019-07-16 PROCEDURE — 99214 OFFICE O/P EST MOD 30 MIN: CPT | Performed by: NURSE PRACTITIONER

## 2019-07-16 RX ORDER — HYDROCODONE BITARTRATE AND ACETAMINOPHEN 10; 325 MG/1; MG/1
1 TABLET ORAL 2 TIMES DAILY PRN
Qty: 60 TABLET | Refills: 0 | Status: SHIPPED | OUTPATIENT
Start: 2019-07-16 | End: 2019-08-16 | Stop reason: SDUPTHER

## 2019-07-16 RX ORDER — IBUPROFEN 600 MG/1
600 TABLET ORAL 3 TIMES DAILY PRN
Qty: 60 TABLET | Refills: 0 | Status: SHIPPED | OUTPATIENT
Start: 2019-07-16 | End: 2019-09-16 | Stop reason: SDUPTHER

## 2019-07-16 RX ORDER — DILTIAZEM HYDROCHLORIDE 180 MG/1
CAPSULE, COATED, EXTENDED RELEASE ORAL
Refills: 3 | COMMUNITY
Start: 2019-07-04 | End: 2019-07-16

## 2019-07-16 NOTE — PROGRESS NOTES
"Subjective   Jaquan Mckay is a 48 y.o. male.     Chief Complaint   Patient presents with   • Anxiety   • Back Pain       History of Present Illness     HTN-chronic and ongoing.  He is under care of Cardiology.  He reports some intermittent CP.  He does have some questions about Diltiazem he received at the pharmacy.    Rash-reports he has a rash after taking biaxin for 2 days.  He reports he was itching \"so bad\".  Rash resolved after the meds were stopped.  He did not have to take Benadryl.  He did use ETOH for soothing .  He reports he shaved his legs to relieve the itching.  Ongoing.    Right elbow-reports he is having significant elbow pain.  He reports has been chronic in nature and he has had several falls and \"hits my arm\".  He reports he has been using ACE bandage but does not have an elbow support.  He reports he would like a referral to specialist.  He is having difficulty with internal rotation of his elbow and the pain does radiate toward his shoulder then back down his lateral arm.        The following portions of the patient's history were reviewed and updated as appropriate: allergies, current medications, past family history, past medical history, past social history, past surgical history and problem list.    Review of Systems   Constitutional: Positive for fatigue. Negative for appetite change and unexpected weight change.   HENT: Negative for congestion, ear pain, nosebleeds, postnasal drip, rhinorrhea, sinus pressure, sinus pain, sore throat, trouble swallowing and voice change.    Eyes: Negative for pain and visual disturbance.   Respiratory: Positive for cough and shortness of breath. Negative for chest tightness and wheezing.    Cardiovascular: Positive for chest pain. Negative for palpitations and leg swelling.   Gastrointestinal: Positive for abdominal pain, constipation and rectal pain. Negative for blood in stool and diarrhea.   Endocrine: Negative for cold intolerance and polydipsia. " "  Genitourinary: Negative for difficulty urinating, dysuria, flank pain and hematuria.   Musculoskeletal: Positive for arthralgias, back pain and myalgias. Negative for gait problem and joint swelling.   Skin: Negative for color change and rash.   Allergic/Immunologic: Negative.    Neurological: Positive for headaches. Negative for syncope and numbness.   Hematological: Negative.    Psychiatric/Behavioral: Positive for dysphoric mood and sleep disturbance. Negative for suicidal ideas. The patient is not nervous/anxious.    All other systems reviewed and are negative.      Objective     /80   Pulse 101   Temp 97.7 °F (36.5 °C) (Temporal)   Ht 170.2 cm (67\")   Wt 95.7 kg (211 lb)   SpO2 98%   BMI 33.05 kg/m²     Physical Exam   Constitutional: He is oriented to person, place, and time. He appears well-developed and well-nourished. No distress.   HENT:   Head: Normocephalic and atraumatic.   Right Ear: External ear and ear canal normal. Tympanic membrane is not erythematous.   Left Ear: External ear and ear canal normal. Tympanic membrane is not erythematous.   Nose: Mucosal edema and rhinorrhea present.   Mouth/Throat: Oropharynx is clear and moist. No oropharyngeal exudate or posterior oropharyngeal erythema.   Eyes: Conjunctivae and EOM are normal. Pupils are equal, round, and reactive to light. No scleral icterus.   Neck: Normal range of motion. Neck supple. No JVD present. No thyromegaly present.   Cardiovascular: Normal rate, regular rhythm and normal heart sounds.   No murmur heard.  Pulmonary/Chest: Effort normal and breath sounds normal. No respiratory distress. He has no decreased breath sounds. He has no wheezes. He exhibits no tenderness.   Abdominal: Soft. Bowel sounds are normal. He exhibits no mass. There is no tenderness.   Musculoskeletal: He exhibits tenderness. He exhibits no edema.        Right elbow: Tenderness found. Medial epicondyle and olecranon process tenderness noted.        Left " knee: He exhibits decreased range of motion and swelling (mild). He exhibits no ecchymosis. Tenderness found. Medial joint line and lateral joint line tenderness noted.        Lumbar back: He exhibits decreased range of motion, tenderness, pain and spasm. He exhibits no swelling.        Right hand: He exhibits tenderness (3rd digit). He exhibits normal capillary refill.     Skin Integrity  -  His right foot skin is intact.His left foot skin is intact..  Lymphadenopathy:        Head (right side): No submandibular adenopathy present.        Head (left side): No submandibular adenopathy present.     He has no cervical adenopathy.   Neurological: He is alert and oriented to person, place, and time. He has normal reflexes. No cranial nerve deficit. He exhibits normal muscle tone. Coordination normal.   Skin: Skin is warm and dry. Capillary refill takes less than 2 seconds. He is not diaphoretic.   Varicosities noted on bilateral lower extremities.    Ecchymosis in varying degrees of color in size on BLE  Resolving rash on thighs and calf area.   Psychiatric: His speech is normal. Judgment and thought content normal. His mood appears anxious. He is not actively hallucinating. Cognition and memory are normal. He exhibits a depressed mood. He expresses no homicidal and no suicidal ideation. He exhibits normal recent memory and normal remote memory.   Flat affect He is attentive.   Vitals reviewed.      Assessment/Plan     Problem List Items Addressed This Visit        Digestive    Class 1 obesity due to excess calories with serious comorbidity and body mass index (BMI) of 33.0 to 33.9 in adult    Current Assessment & Plan     Dietary counseling provided:  Healthy food choices including fruits and vegetables, limit soda and junk foods, adequate water intake.  Increase in physical activity advised at least 30 minutes per day 3-5 days per week.           Relevant Medications    ibuprofen (ADVIL,MOTRIN) 600 MG tablet        Nervous and Auditory    Chronic bilateral low back pain with left-sided sciatica    Relevant Medications    HYDROcodone-acetaminophen (NORCO)  MG per tablet    Chronic elbow pain, right - Primary    Relevant Medications    El[facility] Band[facility] & Supports (EL[facility] BR[facility]) [facility]    Other Relevant Orders    Ambulatory Referral to Orthopedic Surgery (Completed)       Musculoskeletal and Integument    Chondromalacia, knee    Relevant Medications    HYDROcodone-acetaminophen (NORCO)  MG per tablet          Patient's Body mass index is 33.05 kg/m². BMI is above normal parameters. Recommendations include: nutrition counseling.   (Normal BMI:  18.5-24.9, OW 25-29.9, Obesity 30 or greater)    Understands disease processes and need for medications.  Understands reasons for urgent and emergent care.  Patient (& family) verbalized agreement for treatment plan.   Emotional support and active listening provided.  Patient provided time to verbalize feelings.    CHAVEZ #[id number] reviewed today and consistent.  Will refill prescribed controlled medication today.  Patient is aware they cannot receive narcotics from any other provider except if under care of pain management or speciality clinic.  Risk and benefits of medication use has been reviewed.  History and physical exam exhibit continued safe and appropriate use of controlled substances.  The patient is aware of the potential for addiction and dependence.  This patient has been made aware of the appropriate use of such medications, including potential risk of somnolence, limited ability to drive and / or work safely, and potential for overdose.    It has also been made clear that these medications are for use by this patient only, without concomitant use of alcohol or other substances unless prescribed/advised by medical provider.  Patient understands they may be subject to UDS and pill counts at random.      Patient considered to be moderate risk for addiction due to use of  multiple controlled medications.  Patient understands and accepts these risks.  Patient need for medication will be reassessed at each visit.  Doses will be adjusted according to patient need and findings.    Goal of TX: Patient will not have any adverse reactions of medication.  Patient will have reduction in his chronic joint pain with use of Norco PRN.    Called previous and new pharmacy.  No record of dispense of Diltiazem 180 mg.  Patient advised to not take due to uncertainty of origin.  Continue under care of Cardiology.     Referral as above to speciality.    RTC 1 month, sooner if needed.           This document has been electronically signed by:  BALDOMERO Thao, FNP-C    Dragon disclaimer:  Much of this encounter note is an electronic transcription/translation of spoken language to printed text. The electronic translation of spoken language may permit erroneous, or at times, nonsensical words or phrases to be inadvertently transcribed; Although I have reviewed the note for such errors, some may still exist.

## 2019-07-16 NOTE — ASSESSMENT & PLAN NOTE
Dietary counseling provided:  Healthy food choices including fruits and vegetables, limit soda and junk foods, adequate water intake.  Increase in physical activity advised at least 30 minutes per day 3-5 days per week.

## 2019-07-19 ENCOUNTER — OFFICE VISIT (OUTPATIENT)
Dept: CARDIOLOGY | Facility: CLINIC | Age: 49
End: 2019-07-19

## 2019-07-19 VITALS
BODY MASS INDEX: 32.65 KG/M2 | HEIGHT: 67 IN | HEART RATE: 86 BPM | OXYGEN SATURATION: 100 % | DIASTOLIC BLOOD PRESSURE: 82 MMHG | WEIGHT: 208 LBS | SYSTOLIC BLOOD PRESSURE: 132 MMHG

## 2019-07-19 DIAGNOSIS — I10 ESSENTIAL HYPERTENSION: Primary | ICD-10-CM

## 2019-07-19 DIAGNOSIS — I25.10 ASCVD (ARTERIOSCLEROTIC CARDIOVASCULAR DISEASE): ICD-10-CM

## 2019-07-19 DIAGNOSIS — R55 SYNCOPE AND COLLAPSE: ICD-10-CM

## 2019-07-19 DIAGNOSIS — E78.2 MIXED HYPERLIPIDEMIA: ICD-10-CM

## 2019-07-19 DIAGNOSIS — Q24.5 CONGENITAL CORONARY ARTERY ANOMALY: ICD-10-CM

## 2019-07-19 PROCEDURE — 93270 REMOTE 30 DAY ECG REV/REPORT: CPT | Performed by: NURSE PRACTITIONER

## 2019-07-19 PROCEDURE — 99214 OFFICE O/P EST MOD 30 MIN: CPT | Performed by: NURSE PRACTITIONER

## 2019-07-19 NOTE — PROGRESS NOTES
Tiera Valenzuela APRN  Jaquan Mckay  1970 07/19/2019    Patient Active Problem List   Diagnosis   • Gastroesophageal reflux disease without esophagitis   • Arthritis   • Hypertension   • Seizure disorder (CMS/HCC)   • Hyperlipidemia   • Anxiety   • Varicocele   • Varicocele present on ultrasound of scrotum   • Obesity (BMI 30.0-34.9)   • Chronic bilateral low back pain with left-sided sciatica   • Seasonal allergic rhinitis due to pollen   • Mild persistent asthma with acute exacerbation   • Precordial pain   • Dysuria   • Congenital coronary artery anomaly   • BMI 35.0-35.9,adult   • Chondromalacia, knee   • Achilles tendinitis of both lower extremities   • Muscle spasm of both lower legs   • Personal history of allergy to shellfish   • Sensation of cold in lower extremity   • Varicose vein of leg   • Heart palpitations   • Shortness of breath   • Generalized anxiety disorder   • Chronic elbow pain, right   • Chest pain   • Unstable angina (CMS/HCC)   • ASCVD (arteriosclerotic cardiovascular disease)   • Elevated prolactin level (CMS/HCC)   • Other constipation   • Class 1 obesity due to excess calories with serious comorbidity and body mass index (BMI) of 33.0 to 33.9 in adult       Dear Tiera Valenzuela APRN:    Subjective     Chief Complaint   Patient presents with   • Chest Pain     follow up. Last OV 1/29/19...ED visit 6/20/19 -CP   • Med Management     need to call pharmacy.            History of Present Illness:    Jaquan Mckay is a 48 y.o. male with a past medical history significant for hypertension, hyperlipidemia, and chronic chest pain.  He presents today for routine cardiology follow-up.  He underwent cardiac catheterization in September 2018 which revealed mild, nonobstructive ASCVD.  Since that time, he has continued to have chest pains.  Previously, 48-hour Holter monitor was unremarkable.  Echocardiogram revealed normal EF with no significant valvular abnormalities.  He reports recently,  "he has chest pains in the epigastric region which radiate in between his scapula.  This does not necessarily occur with exertion.  He does report a long history of thoracic back pain and \"bulging disks.\"  He reports his most recent episode of chest pain he became short of breath and lost consciousness for a few minutes.  When he awakened he was very diaphoretic.  He went to the ED and was evaluated.  Troponin was unremarkable.  EKG did not reveal any acute ischemic changes.  He reports he has had episodes of syncope multiple times over the past year.  He does have a history of seizures but reports these are controlled on medication and these episodes have been completely different.          Allergies   Allergen Reactions   • Ciprofloxacin Anaphylaxis and Hives   • Paxil [Paroxetine Hcl] Shortness Of Breath     Chest pain    • Peanut-Containing Drug Products Anaphylaxis   • Penicillins Anaphylaxis   • Sulfa Antibiotics Anaphylaxis, Itching and Rash   • Isosorbide Nitrate Rash     Rash, hives, had to use inhaler.    • Doxycycline Hives   • Fish-Derived Products Hives   • Mobic [Meloxicam] Other (See Comments)     Pt states, \"It make my feet and hands go numb and I can't hardly walk.\"    • Pristiq [Desvenlafaxine Succinate Er] Dizziness   • Robitussin Cough+ Chest Max St [Dextromethorphan-Guaifenesin] Unknown (See Comments)     Pt states, \"I don't remember its been so long.\"    • Zoloft [Sertraline Hcl] Hives and Itching   • Clarithromycin Rash   • Contrast Dye Itching and Rash   • Diltiazem Rash   • Gabapentin Rash   • Keflex [Cephalexin] Rash   • Metoprolol Rash   • Prednisone Rash and Other (See Comments)     Face, feet, and legs go completely numb per patient   • Shrimp (Diagnostic) Rash   • Spironolactone Rash   • Viibryd [Vilazodone Hcl] Itching and Rash   :      Current Outpatient Medications:   •  albuterol (PROVENTIL) (2.5 MG/3ML) 0.083% nebulizer solution, Take 2.5 mg by nebulization Every 4 (Four) Hours As " Needed for Shortness of Air., Disp: 180 vial, Rfl: 5  •  albuterol sulfate  (90 Base) MCG/ACT inhaler, Inhale 2 puffs Every 4 (Four) Hours As Needed for Shortness of Air., Disp: 1 inhaler, Rfl: 5  •  ASPIRIN ADULT LOW STRENGTH 81 MG EC tablet, TAKE 1 TABLET BY MOUTH DAILY FOR HEART HEALTH AND CIRCULATION, Disp: 30 tablet, Rfl: 3  •  budesonide-formoterol (SYMBICORT) 160-4.5 MCG/ACT inhaler, Inhale 2 puffs 2 (Two) Times a Day., Disp: 1 inhaler, Rfl: 12  •  busPIRone (BUSPAR) 5 MG tablet, Take 1 tablet by mouth 3 (Three) Times a Day., Disp: 90 tablet, Rfl: 2  •  cetirizine (zyrTEC) 10 MG tablet, Take 1 tablet by mouth Daily., Disp: 30 tablet, Rfl: 5  •  cyclobenzaprine (FLEXERIL) 10 MG tablet, Take 1 tablet by mouth 2 (Two) Times a Day As Needed for Muscle Spasms., Disp: 60 tablet, Rfl: 5  •  doxepin (SINEquan) 25 MG capsule, 1 capsule 2-3 hours before bedtime, Disp: 30 capsule, Rfl: 5  •  Elastic Bandages & Supports (ELBOW BRACE) misc, 1 each Daily., Disp: 1 each, Rfl: 0  •  famotidine (PEPCID) 40 MG tablet, Take 1 tablet by mouth Daily., Disp: 30 tablet, Rfl: 5  •  HYDROcodone-acetaminophen (NORCO)  MG per tablet, Take 1 tablet by mouth 2 (Two) Times a Day As Needed for Moderate Pain ., Disp: 60 tablet, Rfl: 0  •  ibuprofen (ADVIL,MOTRIN) 600 MG tablet, Take 1 tablet by mouth 3 (Three) Times a Day As Needed for Mild Pain  or Moderate Pain ., Disp: 60 tablet, Rfl: 0  •  linaclotide (LINZESS) 290 MCG capsule capsule, Take 1 capsule by mouth Every Morning Before Breakfast., Disp: 30 capsule, Rfl: 5  •  lisinopril (PRINIVIL,ZESTRIL) 20 MG tablet, Take 1 tablet by mouth Daily. FOR BLOOD PRESSURE, Disp: 30 tablet, Rfl: 5  •  meclizine (ANTIVERT) 12.5 MG tablet, Take 1 tablet by mouth 3 (Three) Times a Day As Needed for dizziness., Disp: 30 tablet, Rfl: 0  •  montelukast (SINGULAIR) 10 MG tablet, Take 1 tablet by mouth Every Night., Disp: 30 tablet, Rfl: 5  •  mupirocin (BACTROBAN) 2 % ointment, Apply   topically to the appropriate area as directed 3 (Three) Times a Day., Disp: 30 g, Rfl: 0  •  pantoprazole (PROTONIX) 40 MG EC tablet, TAKE ONE TABLET BY MOUTH DAILY FOR THE STOMACH, Disp: 30 tablet, Rfl: 3  •  phenytoin (DILANTIN) 100 MG ER capsule, Uses brand name, takes 3 capsules in the morning and 3 in the evening., Disp: 180 capsule, Rfl: 3  •  polyethylene glycol (MIRALAX) powder, Take 17 g by mouth Daily., Disp: 578 g, Rfl: 0  •  potassium chloride (K-DUR) 10 MEQ CR tablet, TAKE 1 TABLET BY MOUTH DAILY, Disp: 30 tablet, Rfl: 5  •  pravastatin (PRAVACHOL) 80 MG tablet, Take 1 tablet by mouth every night at bedtime., Disp: 30 tablet, Rfl: 6  •  psyllium (METAMUCIL) 58.6 % powder, Take 1 packet by mouth Daily., Disp: 283 g, Rfl: 11  •  Respiratory Therapy Supplies (NEBULIZER/TUBING/MOUTHPIECE) kit, 1 each Take As Directed., Disp: 1 each, Rfl: 0  •  senna-docusate (PERICOLACE) 8.6-50 MG per tablet, Take 2 tablets by mouth Daily As Needed for Constipation., Disp: 30 tablet, Rfl: 1  •  SYMBICORT 160-4.5 MCG/ACT inhaler, INHALE 2 PUFFS BY MOUTH INTO THE LUNGS 2 TIMES A DAY FOR SHORTNESS OF BREATH, Disp: 10.2 g, Rfl: 5      The following portions of the patient's history were reviewed and updated as appropriate: allergies, current medications, past family history, past medical history, past social history, past surgical history and problem list.    Social History     Tobacco Use   • Smoking status: Current Every Day Smoker     Packs/day: 2.00     Years: 17.00     Pack years: 34.00     Types: Cigars, Cigarettes     Start date: 5/5/2010   • Smokeless tobacco: Never Used   Substance Use Topics   • Alcohol use: No   • Drug use: No       Review of Systems   Constitution: Negative for weakness and malaise/fatigue.   Cardiovascular: Positive for chest pain and syncope. Negative for dyspnea on exertion, irregular heartbeat, leg swelling, near-syncope, orthopnea, palpitations and paroxysmal nocturnal dyspnea.   Respiratory:  "Negative for cough, shortness of breath and wheezing.    Neurological: Negative for dizziness and light-headedness.       Objective   Vitals:    07/19/19 0924   BP: 132/82   BP Location: Right arm   Patient Position: Sitting   Cuff Size: Adult   Pulse: 86   SpO2: 100%   Weight: 94.3 kg (208 lb)   Height: 170.2 cm (67\")     Body mass index is 32.58 kg/m².        Physical Exam   Constitutional: He is oriented to person, place, and time. He appears well-developed and well-nourished.   HENT:   Head: Normocephalic and atraumatic.   Cardiovascular: Normal rate, regular rhythm and normal heart sounds. Exam reveals no S3 and no S4.   No murmur heard.  Pulmonary/Chest: Effort normal and breath sounds normal. He has no wheezes. He has no rales.   Abdominal: Soft. Bowel sounds are normal.   Musculoskeletal: He exhibits no edema.   Neurological: He is alert and oriented to person, place, and time.   Skin: Skin is warm and dry.   Psychiatric: He has a normal mood and affect. His behavior is normal.       Lab Results   Component Value Date     06/20/2019    K 3.4 (L) 06/20/2019     06/20/2019    CO2 23.5 06/20/2019    BUN 12 06/20/2019    CREATININE 1.05 06/20/2019    GLUCOSE 117 (H) 06/20/2019    CALCIUM 9.5 06/20/2019    AST 26 06/20/2019    ALT 24 06/20/2019    ALKPHOS 89 06/20/2019    LABIL2 1.1 (L) 05/29/2016     Lab Results   Component Value Date    CKTOTAL 153 06/20/2019     Lab Results   Component Value Date    WBC 9.31 06/20/2019    HGB 16.0 06/20/2019    HCT 45.5 06/20/2019     06/20/2019           Procedures      Assessment/Plan    Diagnosis Plan   1. Essential hypertension  Comprehensive Metabolic Panel    Lipid Panel   2. Mixed hyperlipidemia  Comprehensive Metabolic Panel    Lipid Panel   3. Congenital coronary artery anomaly  Comprehensive Metabolic Panel    Lipid Panel   4. ASCVD (arteriosclerotic cardiovascular disease)  Comprehensive Metabolic Panel    Lipid Panel   5. Syncope and collapse  " Cardiac Event Monitor    Comprehensive Metabolic Panel    Lipid Panel                Recommendations:    1.  We will evaluate his syncope further with a 30-day event monitor.    2.  CMP and lipid panel ordered.    3.  Follow-up in 6 weeks and if needed.      Return in about 6 weeks (around 8/30/2019) for Recheck event monitor.    As always, I appreciate very much the opportunity to participate in the cardiovascular care of your patients.      With Best Regards,    BALDOMERO Horowitz

## 2019-08-06 ENCOUNTER — OFFICE VISIT (OUTPATIENT)
Dept: FAMILY MEDICINE CLINIC | Facility: CLINIC | Age: 49
End: 2019-08-06

## 2019-08-06 ENCOUNTER — APPOINTMENT (OUTPATIENT)
Dept: LAB | Facility: HOSPITAL | Age: 49
End: 2019-08-06

## 2019-08-06 VITALS
DIASTOLIC BLOOD PRESSURE: 82 MMHG | HEART RATE: 80 BPM | BODY MASS INDEX: 33.12 KG/M2 | TEMPERATURE: 97.2 F | SYSTOLIC BLOOD PRESSURE: 122 MMHG | WEIGHT: 211 LBS | OXYGEN SATURATION: 99 % | HEIGHT: 67 IN

## 2019-08-06 DIAGNOSIS — L65.9 HAIR LOSS: ICD-10-CM

## 2019-08-06 DIAGNOSIS — Z12.5 PROSTATE CANCER SCREENING: ICD-10-CM

## 2019-08-06 DIAGNOSIS — I10 ESSENTIAL HYPERTENSION: ICD-10-CM

## 2019-08-06 DIAGNOSIS — R53.83 OTHER FATIGUE: ICD-10-CM

## 2019-08-06 DIAGNOSIS — E78.00 PURE HYPERCHOLESTEROLEMIA: ICD-10-CM

## 2019-08-06 DIAGNOSIS — E16.2 HYPOGLYCEMIA: ICD-10-CM

## 2019-08-06 DIAGNOSIS — E66.9 OBESITY (BMI 30.0-34.9): ICD-10-CM

## 2019-08-06 DIAGNOSIS — F41.1 GENERALIZED ANXIETY DISORDER: ICD-10-CM

## 2019-08-06 DIAGNOSIS — R07.89 OTHER CHEST PAIN: Primary | ICD-10-CM

## 2019-08-06 DIAGNOSIS — L21.9 SEBORRHEIC DERMATITIS OF SCALP: ICD-10-CM

## 2019-08-06 DIAGNOSIS — G40.909 SEIZURE DISORDER (HCC): ICD-10-CM

## 2019-08-06 DIAGNOSIS — R79.89 ELEVATED PROLACTIN LEVEL: ICD-10-CM

## 2019-08-06 PROCEDURE — 36415 COLL VENOUS BLD VENIPUNCTURE: CPT | Performed by: NURSE PRACTITIONER

## 2019-08-06 PROCEDURE — 84146 ASSAY OF PROLACTIN: CPT | Performed by: NURSE PRACTITIONER

## 2019-08-06 PROCEDURE — 84153 ASSAY OF PSA TOTAL: CPT | Performed by: NURSE PRACTITIONER

## 2019-08-06 PROCEDURE — 84439 ASSAY OF FREE THYROXINE: CPT | Performed by: NURSE PRACTITIONER

## 2019-08-06 PROCEDURE — 80186 ASSAY OF PHENYTOIN FREE: CPT | Performed by: NURSE PRACTITIONER

## 2019-08-06 PROCEDURE — 84481 FREE ASSAY (FT-3): CPT | Performed by: NURSE PRACTITIONER

## 2019-08-06 PROCEDURE — 80050 GENERAL HEALTH PANEL: CPT | Performed by: NURSE PRACTITIONER

## 2019-08-06 PROCEDURE — 82306 VITAMIN D 25 HYDROXY: CPT | Performed by: NURSE PRACTITIONER

## 2019-08-06 PROCEDURE — 83036 HEMOGLOBIN GLYCOSYLATED A1C: CPT | Performed by: NURSE PRACTITIONER

## 2019-08-06 PROCEDURE — 82607 VITAMIN B-12: CPT | Performed by: NURSE PRACTITIONER

## 2019-08-06 PROCEDURE — 83735 ASSAY OF MAGNESIUM: CPT | Performed by: NURSE PRACTITIONER

## 2019-08-06 PROCEDURE — 80185 ASSAY OF PHENYTOIN TOTAL: CPT | Performed by: NURSE PRACTITIONER

## 2019-08-06 PROCEDURE — 84154 ASSAY OF PSA FREE: CPT | Performed by: NURSE PRACTITIONER

## 2019-08-06 PROCEDURE — 80061 LIPID PANEL: CPT | Performed by: NURSE PRACTITIONER

## 2019-08-06 PROCEDURE — 99214 OFFICE O/P EST MOD 30 MIN: CPT | Performed by: NURSE PRACTITIONER

## 2019-08-06 RX ORDER — KETOCONAZOLE 20 MG/ML
SHAMPOO TOPICAL 2 TIMES WEEKLY
Qty: 100 ML | Refills: 1 | Status: SHIPPED | OUTPATIENT
Start: 2019-08-08 | End: 2019-09-25

## 2019-08-06 RX ORDER — DIPHENHYDRAMINE HCL 25 MG
25 TABLET ORAL EVERY 6 HOURS PRN
Qty: 30 TABLET | Refills: 1 | Status: SHIPPED | OUTPATIENT
Start: 2019-08-06 | End: 2020-01-13 | Stop reason: SDUPTHER

## 2019-08-06 NOTE — PROGRESS NOTES
"Subjective   Jaquan Mckay is a 48 y.o. male.     Chief Complaint   Patient presents with   • Hair Loss       History of Present Illness     Hair concern-Reports he noted some thinning of his hair over the last few weeks but \"august 1st\" he noted a loss of clump of hair that came out with combing.  He has had two processed treatments of his hair this year.  The most recent was approx 1 month ago.  He reports he has noted a change in his sensation of his head.  He felt his hair has been thinning over time.  He reports he has not changed any products or that the stylist did not change any chemicals.  He is concerned it may be his thyroid.  He reports   Seizure-reports he feels he is staring.  He is taking Dilantin as directed.  He has been on multiple years after \"removed brain tumor\".  He is not followed by Neurology.  He has never been on any other medications.  He has not noted any other s/sx.  Not at goal.    The following portions of the patient's history were reviewed and updated as appropriate: allergies, current medications, past family history, past medical history, past social history, past surgical history and problem list.    Review of Systems   Constitutional: Positive for fatigue. Negative for appetite change and unexpected weight change.   HENT: Negative for congestion, ear pain, nosebleeds, postnasal drip, rhinorrhea, sinus pressure, sinus pain, sore throat, trouble swallowing and voice change.    Eyes: Negative for pain and visual disturbance.   Respiratory: Positive for cough and shortness of breath. Negative for chest tightness and wheezing.    Cardiovascular: Positive for chest pain. Negative for palpitations and leg swelling.   Gastrointestinal: Positive for abdominal pain and constipation. Negative for blood in stool, diarrhea and rectal pain.   Endocrine: Negative for cold intolerance and polydipsia.   Genitourinary: Negative for difficulty urinating, dysuria, flank pain and hematuria. " "  Musculoskeletal: Positive for arthralgias, back pain and myalgias. Negative for gait problem and joint swelling.   Skin: Negative for color change and rash.   Allergic/Immunologic: Negative.    Neurological: Positive for headaches. Negative for syncope and numbness.   Hematological: Negative.    Psychiatric/Behavioral: Positive for dysphoric mood and sleep disturbance. Negative for suicidal ideas. The patient is not nervous/anxious.    All other systems reviewed and are negative.      Objective     /82   Pulse 80   Temp 97.2 °F (36.2 °C) (Temporal)   Ht 170.2 cm (67\")   Wt 95.7 kg (211 lb)   SpO2 99%   BMI 33.05 kg/m²     Physical Exam   Constitutional: He is oriented to person, place, and time. He appears well-developed and well-nourished. No distress.   HENT:   Head: Normocephalic and atraumatic.   Right Ear: External ear and ear canal normal. Tympanic membrane is not erythematous.   Left Ear: External ear and ear canal normal. Tympanic membrane is not erythematous.   Nose: Mucosal edema and rhinorrhea present.   Mouth/Throat: Oropharynx is clear and moist. No oropharyngeal exudate or posterior oropharyngeal erythema.   Eyes: Conjunctivae and EOM are normal. Pupils are equal, round, and reactive to light. No scleral icterus.   Neck: Normal range of motion. Neck supple. No JVD present. No thyromegaly present.   Cardiovascular: Normal rate, regular rhythm and normal heart sounds.   No murmur heard.  Pulmonary/Chest: Effort normal and breath sounds normal. No respiratory distress. He has no decreased breath sounds. He has no wheezes. He exhibits no tenderness.   Abdominal: Soft. Bowel sounds are normal. He exhibits no mass. There is no tenderness.   Musculoskeletal: He exhibits tenderness. He exhibits no edema.        Right elbow: Tenderness found. Medial epicondyle and olecranon process tenderness noted.        Left knee: He exhibits decreased range of motion and swelling (mild). He exhibits no " ecchymosis. Tenderness found. Medial joint line and lateral joint line tenderness noted.        Lumbar back: He exhibits decreased range of motion, tenderness, pain and spasm. He exhibits no swelling.        Right hand: He exhibits tenderness (3rd digit). He exhibits normal capillary refill.     Skin Integrity  -  His right foot skin is intact.His left foot skin is intact..  Lymphadenopathy:        Head (right side): No submandibular adenopathy present.        Head (left side): No submandibular adenopathy present.     He has no cervical adenopathy.   Neurological: He is alert and oriented to person, place, and time. He has normal reflexes. No cranial nerve deficit. He exhibits normal muscle tone. Coordination normal.   Skin: Skin is warm and dry. Capillary refill takes less than 2 seconds. He is not diaphoretic.   Varicosities noted on bilateral lower extremities.    Ecchymosis in varying degrees of color in size on BLE  Resolving rash on thighs and calf area.   Psychiatric: His speech is normal. Judgment and thought content normal. His mood appears anxious. He is not actively hallucinating. Cognition and memory are normal. He exhibits a depressed mood. He expresses no homicidal and no suicidal ideation. He exhibits normal recent memory and normal remote memory.   Flat affect He is attentive.   Vitals reviewed.      Assessment/Plan     Problem List Items Addressed This Visit        Cardiovascular and Mediastinum    Hypertension    Relevant Orders    CBC & Differential    Comprehensive Metabolic Panel    Hyperlipidemia    Relevant Orders    Lipid Panel       Nervous and Auditory    Seizure disorder (CMS/HCC)    Relevant Orders    Vitamin D 25 Hydroxy    Phenytoin level, free    Phenytoin level, total    Chest pain - Primary       Other    Obesity (BMI 30.0-34.9)    Relevant Orders    CBC & Differential    Comprehensive Metabolic Panel    Generalized anxiety disorder    Elevated prolactin level (CMS/HCC)    Relevant  Orders    Prolactin      Other Visit Diagnoses     Hypoglycemia        Relevant Orders    Hemoglobin A1c    Other fatigue        Relevant Orders    Vitamin B12    Magnesium    Hair loss        Relevant Orders    TSH    T4, Free    T3, Free    Prostate cancer screening        Relevant Orders    PSA, Total & Free    Seborrheic dermatitis of scalp        Relevant Medications    ketoconazole (NIZORAL) 2 % shampoo (Start on 8/8/2019)          Patient's Body mass index is 33.05 kg/m². BMI is above normal parameters. Recommendations include: nutrition counseling.   (Normal BMI:  18.5-24.9, OW 25-29.9, Obesity 30 or greater)    Understands disease processes and need for medications.  Understands reasons for urgent and emergent care.  Patient (& family) verbalized agreement for treatment plan.   Emotional support and active listening provided.  Patient provided time to verbalize feelings.    Fasting labs ordered.  Will call patient with results and make any meds changes PRN  Continue meds as directed.     Keep scheduled follow up.         This document has been electronically signed by:  BALDOMERO Thao, FNP-C    Dragon disclaimer:  Much of this encounter note is an electronic transcription/translation of spoken language to printed text. The electronic translation of spoken language may permit erroneous, or at times, nonsensical words or phrases to be inadvertently transcribed; Although I have reviewed the note for such errors, some may still exist.

## 2019-08-07 ENCOUNTER — OFFICE VISIT (OUTPATIENT)
Dept: SURGERY | Facility: CLINIC | Age: 49
End: 2019-08-07

## 2019-08-07 VITALS
BODY MASS INDEX: 33.12 KG/M2 | SYSTOLIC BLOOD PRESSURE: 129 MMHG | HEART RATE: 88 BPM | HEIGHT: 67 IN | WEIGHT: 211 LBS | DIASTOLIC BLOOD PRESSURE: 77 MMHG

## 2019-08-07 DIAGNOSIS — K59.09 OTHER CONSTIPATION: Primary | ICD-10-CM

## 2019-08-07 LAB
25(OH)D3 SERPL-MCNC: 38.3 NG/ML (ref 30–100)
ALBUMIN SERPL-MCNC: 4.5 G/DL (ref 3.5–5.2)
ALBUMIN/GLOB SERPL: 1.6 G/DL
ALP SERPL-CCNC: 71 U/L (ref 39–117)
ALT SERPL W P-5'-P-CCNC: 18 U/L (ref 1–41)
ANION GAP SERPL CALCULATED.3IONS-SCNC: 13 MMOL/L (ref 5–15)
AST SERPL-CCNC: 20 U/L (ref 1–40)
BASOPHILS # BLD AUTO: 0.02 10*3/MM3 (ref 0–0.2)
BASOPHILS NFR BLD AUTO: 0.3 % (ref 0–1.5)
BILIRUB SERPL-MCNC: 0.3 MG/DL (ref 0.2–1.2)
BUN BLD-MCNC: 13 MG/DL (ref 6–20)
BUN/CREAT SERPL: 15.1 (ref 7–25)
CALCIUM SPEC-SCNC: 9 MG/DL (ref 8.6–10.5)
CHLORIDE SERPL-SCNC: 100 MMOL/L (ref 98–107)
CHOLEST SERPL-MCNC: 200 MG/DL (ref 0–200)
CO2 SERPL-SCNC: 26 MMOL/L (ref 22–29)
CREAT BLD-MCNC: 0.86 MG/DL (ref 0.76–1.27)
DEPRECATED RDW RBC AUTO: 47.6 FL (ref 37–54)
EOSINOPHIL # BLD AUTO: 0.12 10*3/MM3 (ref 0–0.4)
EOSINOPHIL NFR BLD AUTO: 1.9 % (ref 0.3–6.2)
ERYTHROCYTE [DISTWIDTH] IN BLOOD BY AUTOMATED COUNT: 13.1 % (ref 12.3–15.4)
GFR SERPL CREATININE-BSD FRML MDRD: 96 ML/MIN/1.73
GLOBULIN UR ELPH-MCNC: 2.9 GM/DL
GLUCOSE BLD-MCNC: 128 MG/DL (ref 65–99)
HBA1C MFR BLD: 4.8 % (ref 4.8–5.6)
HCT VFR BLD AUTO: 46.3 % (ref 37.5–51)
HDLC SERPL-MCNC: 73 MG/DL (ref 40–60)
HGB BLD-MCNC: 15.3 G/DL (ref 13–17.7)
IMM GRANULOCYTES # BLD AUTO: 0.02 10*3/MM3 (ref 0–0.05)
IMM GRANULOCYTES NFR BLD AUTO: 0.3 % (ref 0–0.5)
LDLC SERPL CALC-MCNC: 102 MG/DL (ref 0–100)
LDLC/HDLC SERPL: 1.4 {RATIO}
LYMPHOCYTES # BLD AUTO: 2.05 10*3/MM3 (ref 0.7–3.1)
LYMPHOCYTES NFR BLD AUTO: 33.3 % (ref 19.6–45.3)
MAGNESIUM SERPL-MCNC: 1.8 MG/DL (ref 1.6–2.6)
MCH RBC QN AUTO: 32.7 PG (ref 26.6–33)
MCHC RBC AUTO-ENTMCNC: 33 G/DL (ref 31.5–35.7)
MCV RBC AUTO: 98.9 FL (ref 79–97)
MONOCYTES # BLD AUTO: 0.77 10*3/MM3 (ref 0.1–0.9)
MONOCYTES NFR BLD AUTO: 12.5 % (ref 5–12)
NEUTROPHILS # BLD AUTO: 3.18 10*3/MM3 (ref 1.7–7)
NEUTROPHILS NFR BLD AUTO: 51.7 % (ref 42.7–76)
NRBC BLD AUTO-RTO: 0 /100 WBC (ref 0–0.2)
PHENYTOIN SERPL-MCNC: 15.3 MCG/ML (ref 10–20)
PLATELET # BLD AUTO: 199 10*3/MM3 (ref 140–450)
PMV BLD AUTO: 12.4 FL (ref 6–12)
POTASSIUM BLD-SCNC: 4.2 MMOL/L (ref 3.5–5.2)
PROLACTIN SERPL-MCNC: 10.9 NG/ML (ref 4.04–15.2)
PROT SERPL-MCNC: 7.4 G/DL (ref 6–8.5)
RBC # BLD AUTO: 4.68 10*6/MM3 (ref 4.14–5.8)
SODIUM BLD-SCNC: 139 MMOL/L (ref 136–145)
T3FREE SERPL-MCNC: 3.29 PG/ML (ref 2–4.4)
T4 FREE SERPL-MCNC: 0.99 NG/DL (ref 0.93–1.7)
TRIGL SERPL-MCNC: 123 MG/DL (ref 0–150)
TSH SERPL DL<=0.05 MIU/L-ACNC: 2.11 MIU/ML (ref 0.27–4.2)
VIT B12 BLD-MCNC: 457 PG/ML (ref 211–946)
VLDLC SERPL-MCNC: 24.6 MG/DL (ref 5–40)
WBC NRBC COR # BLD: 6.16 10*3/MM3 (ref 3.4–10.8)

## 2019-08-07 PROCEDURE — 99212 OFFICE O/P EST SF 10 MIN: CPT | Performed by: SURGERY

## 2019-08-07 RX ORDER — PSEUDOEPHEDRINE HCL 30 MG
250 TABLET ORAL 2 TIMES DAILY
Qty: 60 CAPSULE | Refills: 1 | Status: SHIPPED | OUTPATIENT
Start: 2019-08-07 | End: 2019-09-25

## 2019-08-07 RX ORDER — MAGNESIUM CARB/ALUMINUM HYDROX 105-160MG
150 TABLET,CHEWABLE ORAL ONCE
Qty: 195 ML | Refills: 0 | Status: SHIPPED | OUTPATIENT
Start: 2019-08-07 | End: 2019-08-07

## 2019-08-07 RX ORDER — POLYETHYLENE GLYCOL 3350 17 G/17G
17 POWDER, FOR SOLUTION ORAL DAILY
Qty: 578 G | Refills: 0 | Status: SHIPPED | OUTPATIENT
Start: 2019-08-07 | End: 2019-09-25

## 2019-08-08 LAB
PSA FREE MFR SERPL: 25 %
PSA FREE SERPL-MCNC: 0.2 NG/ML
PSA SERPL-MCNC: 0.8 NG/ML (ref 0–4)

## 2019-08-09 LAB — PHENYTOIN FREE SERPL-MCNC: 0.9 UG/ML (ref 1–2)

## 2019-08-12 ENCOUNTER — LAB (OUTPATIENT)
Dept: LAB | Facility: HOSPITAL | Age: 49
End: 2019-08-12

## 2019-08-12 DIAGNOSIS — I25.10 ASCVD (ARTERIOSCLEROTIC CARDIOVASCULAR DISEASE): ICD-10-CM

## 2019-08-12 DIAGNOSIS — Q24.5 CONGENITAL CORONARY ARTERY ANOMALY: ICD-10-CM

## 2019-08-12 DIAGNOSIS — I10 ESSENTIAL HYPERTENSION: ICD-10-CM

## 2019-08-12 DIAGNOSIS — R55 SYNCOPE AND COLLAPSE: ICD-10-CM

## 2019-08-12 DIAGNOSIS — E78.2 MIXED HYPERLIPIDEMIA: ICD-10-CM

## 2019-08-12 PROCEDURE — 80053 COMPREHEN METABOLIC PANEL: CPT

## 2019-08-12 PROCEDURE — 80061 LIPID PANEL: CPT

## 2019-08-12 PROCEDURE — 36415 COLL VENOUS BLD VENIPUNCTURE: CPT

## 2019-08-12 NOTE — PROGRESS NOTES
Subjective   Jaquan Mckay is a 48 y.o. male  is here today for follow-up.         Jaquan Mckay is a 48 y.o. male here for follow up for constipation.  Patient had a reaction to a stool softener and is being converted to a different stool softener variety.  He had some improvement with stopping his postoperative opiate persistent constipation was present.  He continues to take narcotics so this will be a lifelong problem unless he is weaned off of his narcotics.          Assessment     Jaquan was seen today for constipation.    Diagnoses and all orders for this visit:    Other constipation    Other orders  -     polyethylene glycol (MIRALAX) powder; Take 17 g by mouth Daily.  -     docusate sodium 250 MG capsule; Take 250 mg by mouth 2 (Two) Times a Day.  -     magnesium citrate 1.745 GM/30ML solution solution; Take 150 mL by mouth 1 (One) Time for 1 dose.      Jaquan Mckay is a 48 y.o. male with constipation likely secondary to narcotic use.  He will attempt to wean his narcotics with his primary care or pain medication provider and continue bowel regimen.

## 2019-08-13 DIAGNOSIS — I25.10 ASCVD (ARTERIOSCLEROTIC CARDIOVASCULAR DISEASE): Primary | ICD-10-CM

## 2019-08-13 LAB
ALBUMIN SERPL-MCNC: 4.3 G/DL (ref 3.5–5.2)
ALBUMIN/GLOB SERPL: 1.3 G/DL
ALP SERPL-CCNC: 66 U/L (ref 39–117)
ALT SERPL W P-5'-P-CCNC: 14 U/L (ref 1–41)
ANION GAP SERPL CALCULATED.3IONS-SCNC: 12.2 MMOL/L (ref 5–15)
AST SERPL-CCNC: 19 U/L (ref 1–40)
BILIRUB SERPL-MCNC: 0.4 MG/DL (ref 0.2–1.2)
BUN BLD-MCNC: 14 MG/DL (ref 6–20)
BUN/CREAT SERPL: 15.7 (ref 7–25)
CALCIUM SPEC-SCNC: 9.3 MG/DL (ref 8.6–10.5)
CHLORIDE SERPL-SCNC: 98 MMOL/L (ref 98–107)
CHOLEST SERPL-MCNC: 231 MG/DL (ref 0–200)
CO2 SERPL-SCNC: 25.8 MMOL/L (ref 22–29)
CREAT BLD-MCNC: 0.89 MG/DL (ref 0.76–1.27)
GFR SERPL CREATININE-BSD FRML MDRD: 92 ML/MIN/1.73
GLOBULIN UR ELPH-MCNC: 3.2 GM/DL
GLUCOSE BLD-MCNC: 90 MG/DL (ref 65–99)
HDLC SERPL-MCNC: 81 MG/DL (ref 40–60)
LDLC SERPL CALC-MCNC: 140 MG/DL (ref 0–100)
LDLC/HDLC SERPL: 1.73 {RATIO}
POTASSIUM BLD-SCNC: 4.1 MMOL/L (ref 3.5–5.2)
PROT SERPL-MCNC: 7.5 G/DL (ref 6–8.5)
SODIUM BLD-SCNC: 136 MMOL/L (ref 136–145)
TRIGL SERPL-MCNC: 50 MG/DL (ref 0–150)
VLDLC SERPL-MCNC: 10 MG/DL (ref 5–40)

## 2019-08-13 RX ORDER — ATORVASTATIN CALCIUM 40 MG/1
40 TABLET, FILM COATED ORAL DAILY
Qty: 90 TABLET | Refills: 3 | Status: SHIPPED | OUTPATIENT
Start: 2019-08-13 | End: 2020-07-09 | Stop reason: SDUPTHER

## 2019-08-16 ENCOUNTER — OFFICE VISIT (OUTPATIENT)
Dept: FAMILY MEDICINE CLINIC | Facility: CLINIC | Age: 49
End: 2019-08-16

## 2019-08-16 VITALS
HEIGHT: 67 IN | WEIGHT: 210 LBS | HEART RATE: 92 BPM | DIASTOLIC BLOOD PRESSURE: 82 MMHG | SYSTOLIC BLOOD PRESSURE: 130 MMHG | BODY MASS INDEX: 32.96 KG/M2 | TEMPERATURE: 98.9 F | OXYGEN SATURATION: 93 %

## 2019-08-16 DIAGNOSIS — M54.42 CHRONIC BILATERAL LOW BACK PAIN WITH LEFT-SIDED SCIATICA: ICD-10-CM

## 2019-08-16 DIAGNOSIS — E78.2 MIXED HYPERLIPIDEMIA: ICD-10-CM

## 2019-08-16 DIAGNOSIS — E66.09 CLASS 1 OBESITY DUE TO EXCESS CALORIES WITH SERIOUS COMORBIDITY AND BODY MASS INDEX (BMI) OF 33.0 TO 33.9 IN ADULT: Primary | ICD-10-CM

## 2019-08-16 DIAGNOSIS — Z51.81 ENCOUNTER FOR THERAPEUTIC DRUG MONITORING: ICD-10-CM

## 2019-08-16 DIAGNOSIS — G89.29 CHRONIC BILATERAL LOW BACK PAIN WITH LEFT-SIDED SCIATICA: ICD-10-CM

## 2019-08-16 DIAGNOSIS — M94.262 CHONDROMALACIA OF LEFT KNEE: ICD-10-CM

## 2019-08-16 PROCEDURE — 99215 OFFICE O/P EST HI 40 MIN: CPT | Performed by: NURSE PRACTITIONER

## 2019-08-16 RX ORDER — HYDROCODONE BITARTRATE AND ACETAMINOPHEN 10; 325 MG/1; MG/1
1 TABLET ORAL 2 TIMES DAILY PRN
Qty: 60 TABLET | Refills: 0 | Status: SHIPPED | OUTPATIENT
Start: 2019-08-16 | End: 2019-09-16 | Stop reason: SDUPTHER

## 2019-08-16 NOTE — PATIENT INSTRUCTIONS
Preventing High Cholesterol  Cholesterol is a waxy, fat-like substance that your body needs in small amounts. Your liver makes all the cholesterol that your body needs. Having high cholesterol (hypercholesterolemia) increases your risk for heart disease and stroke. Extra (excess) cholesterol comes from the food you eat, such as animal-based fat (saturated fat) from meat and some dairy products.  High cholesterol can often be prevented with diet and lifestyle changes. If you already have high cholesterol, you can control it with diet and lifestyle changes, as well as medicine.  What nutrition changes can be made?  · Eat less saturated fat. Foods that contain saturated fat include red meat and some dairy products.  · Avoid processed meats, like denny and lunch meats.  · Avoid trans fats, which are found in margarine and some baked goods.  · Avoid foods and beverages that have added sugars.  · Eat more fruits, vegetables, and whole grains.  · Choose healthy sources of protein, such as fish, poultry, and nuts.  · Choose healthy sources of fat, such as:  ? Nuts.  ? Vegetable oils, especially olive oil.  ? Fish that have healthy fats (omega-3 fatty acids), such as mackerel or salmon.  What lifestyle changes can be made?    · Lose weight if you are overweight. Losing 5-10 lb (2.3-4.5 kg) can help prevent or control high cholesterol and reduce your risk for diabetes and high blood pressure. Ask your health care provider to help you with a diet and exercise plan to safely lose weight.  · Get enough exercise. Do at least 150 minutes of moderate-intensity exercise each week.  ? You could do this in short exercise sessions several times a day, or you could do longer exercise sessions a few times a week. For example, you could take a brisk 10-minute walk or bike ride, 3 times a day, for 5 days a week.  · Do not smoke. If you need help quitting, ask your health care provider.  · Limit your alcohol intake. If you drink alcohol,  limit alcohol intake to no more than 1 drink a day for nonpregnant women and 2 drinks a day for men. One drink equals 12 oz of beer, 5 oz of wine, or 1½ oz of hard liquor.  Why are these changes important?    If you have high cholesterol, deposits (plaques) may build up on the walls of your blood vessels. Plaques make the arteries narrower and stiffer, which can restrict or block blood flow and cause blood clots to form. This greatly increases your risk for heart attack and stroke. Making diet and lifestyle changes can reduce your risk for these life-threatening conditions.  What can I do to lower my risk?  · Manage your risk factors for high cholesterol. Talk with your health care provider about all of your risk factors and how to lower your risk.  · Manage other conditions that you have, such as diabetes or high blood pressure (hypertension).  · Have your cholesterol checked at regular intervals.  · Keep all follow-up visits as told by your health care provider. This is important.  How is this treated?  In addition to diet and lifestyle changes, your health care provider may recommend medicines to help lower cholesterol, such as a medicine to reduce the amount of cholesterol made in your liver. You may need medicine if:  · Diet and lifestyle changes do not lower your cholesterol enough.  · You have high cholesterol and other risk factors for heart disease or stroke.  Take over-the-counter and prescription medicines only as told by your health care provider.  Where to find more information  · American Heart Association: www.heart.org/HEARTORG/Conditions/Cholesterol/Cholesterol_UCM_001089_SubHomePage.jsp  · National Heart, Lung, and Blood [place]: www.nhlbi.nih.gov/health/resources/heart/heart-cholesterol-hbc-what-html  Summary  · High cholesterol increases your risk for heart disease and stroke. By keeping your cholesterol level low, you can reduce your risk for these conditions.  · Diet and lifestyle changes are  the most important steps in preventing high cholesterol.  · Work with your health care provider to manage your risk factors, and have your blood tested regularly.  This information is not intended to replace advice given to you by your health care provider. Make sure you discuss any questions you have with your health care provider.  Document Released: 01/01/2017 Document Revised: 08/26/2017 Document Reviewed: 08/26/2017  [employer] Interactive Patient Education © 2019 [employer]  Cholesterol    Cholesterol is a fat. Your body needs a small amount of cholesterol. Cholesterol (plaque) may build up in your blood vessels (arteries). That makes you more likely to have a heart attack or stroke.  You cannot feel your cholesterol level. Having a blood test is the only way to find out if your level is high. Keep your test results. Work with your doctor to keep your cholesterol at a good level.  What do the results mean?  · Total cholesterol is how much cholesterol is in your blood.  · LDL is bad cholesterol. This is the type that can build up. Try to have low LDL.  · HDL is good cholesterol. It cleans your blood vessels and carries LDL away. Try to have high HDL.  · Triglycerides are fat that the body can store or burn for energy.  What are good levels of cholesterol?  · Total cholesterol below 200.  · LDL below 100 is good for people who have health risks. LDL below 70 is good for people who have very high risks.  · HDL above 40 is good. It is best to have HDL of 60 or higher.  · Triglycerides below 150.  How can I lower my cholesterol?  Diet  Follow your diet program as told by your doctor.  · Choose fish, white meat chicken, or turkey that is roasted or baked. Try not to eat red meat, fried foods, sausage, or lunch meats.  · Eat lots of fresh fruits and vegetables.  · Choose whole grains, beans, pasta, potatoes, and cereals.  · Choose olive oil, corn oil, or canola oil. Only use small amounts.  · Try not to eat butter,  mayonnaise, shortening, or palm kernel oils.  · Try not to eat foods with trans fats.  · Choose low-fat or nonfat dairy foods.  ? Drink skim or nonfat milk.  ? Eat low-fat or nonfat yogurt and cheeses.  ? Try not to drink whole milk or cream.  ? Try not to eat ice cream, egg yolks, or full-fat cheeses.  · Healthy desserts include zena food cake, billy snaps, animal crackers, hard candy, popsicles, and low-fat or nonfat frozen yogurt. Try not to eat pastries, cakes, pies, and cookies.    Exercise  Follow your exercise program as told by your doctor.  · Be more active. Try gardening, walking, and taking the stairs.  · Ask your doctor about ways that you can be more active.  Medicine  · Take over-the-counter and prescription medicines only as told by your doctor.  This information is not intended to replace advice given to you by your health care provider. Make sure you discuss any questions you have with your health care provider.  Document Released: 03/16/2010 Document Revised: 07/19/2017 Document Reviewed: 06/29/2017  [employer] Interactive Patient Education © 2019 [employer].

## 2019-08-16 NOTE — PROGRESS NOTES
"Subjective   Jaquan Mckay is a 48 y.o. male.   His ex-spouse is present for today's visit.     Chief Complaint   Patient presents with   • Anxiety   • Hypertension       History of Present Illness     Right elbow Pain-presently in splint.   He is suspected to have an elbow fracture.  He will be having an MRI on Monday at [facility].  He will be seen back at Dr. Dubin's office after that appointment.  Ongoing.    Cholesterol-noted elevations on his last labs.  Change in his pravastatin to Atorvastatin per his cardiology provider.  He reports he is tolerating the medication well.  He has not noted any negative side effects.   Asthma-doing well.  No acute concerns.  No exacerbation.  He reports he did have to use his albuterol \"the other day\" during exercise.  Symptoms improved with use.  He is also taking Symbicort BID as directed.  He is followed annually by BALDOMERO Anderson.  He is also on Montelukast 10 mg QHS.  Ongoing.    Chronic back joint and leg pain-chronic and ongoing- patient is presently taking Norco 10 mg as directed.  He denies any negative side effects he continues to have not made a follow-up for his knee pain with orthopedic that he saw in [place] who advised him to have PT.  He is also not had physical therapy for his knee pain.  He does not have any acute concerns today as \"my elbow is the worst thing right now\".    The following portions of the patient's history were reviewed and updated as appropriate: allergies, current medications, past family history, past medical history, past social history, past surgical history and problem list.    Review of Systems   Constitutional: Positive for fatigue. Negative for appetite change and unexpected weight change.   HENT: Negative for congestion, ear pain, nosebleeds, postnasal drip, rhinorrhea, sinus pressure, sinus pain, sore throat, trouble swallowing and voice change.    Eyes: Negative for pain and visual disturbance.   Respiratory: " "Positive for cough and shortness of breath. Negative for chest tightness and wheezing.    Cardiovascular: Negative for chest pain, palpitations and leg swelling.   Gastrointestinal: Positive for abdominal pain, constipation and rectal pain. Negative for blood in stool and diarrhea.   Endocrine: Negative for cold intolerance and polydipsia.   Genitourinary: Negative for difficulty urinating, dysuria, flank pain and hematuria.   Musculoskeletal: Positive for arthralgias, back pain and myalgias. Negative for gait problem and joint swelling.   Skin: Negative for color change and rash.   Allergic/Immunologic: Negative.    Neurological: Positive for headaches. Negative for syncope and numbness.   Hematological: Negative.    Psychiatric/Behavioral: Positive for dysphoric mood and sleep disturbance. Negative for suicidal ideas. The patient is not nervous/anxious.    All other systems reviewed and are negative.      Objective     /82   Pulse 92   Temp 98.9 °F (37.2 °C) (Temporal)   Ht 170.2 cm (67\")   Wt 95.3 kg (210 lb)   SpO2 93%   BMI 32.89 kg/m²     Physical Exam   Constitutional: He is oriented to person, place, and time. He appears well-developed and well-nourished. No distress.   HENT:   Head: Normocephalic and atraumatic.   Right Ear: External ear and ear canal normal. Tympanic membrane is not erythematous.   Left Ear: External ear and ear canal normal. Tympanic membrane is not erythematous.   Nose: Mucosal edema and rhinorrhea present.   Mouth/Throat: Oropharynx is clear and moist. No oropharyngeal exudate or posterior oropharyngeal erythema.   Eyes: Conjunctivae and EOM are normal. Pupils are equal, round, and reactive to light. No scleral icterus.   Neck: Normal range of motion. Neck supple. No JVD present. No thyromegaly present.   Cardiovascular: Normal rate, regular rhythm and normal heart sounds.   No murmur heard.  Pulmonary/Chest: Effort normal and breath sounds normal. No respiratory distress. " He has no decreased breath sounds. He has no wheezes. He exhibits no tenderness.   Abdominal: Soft. Bowel sounds are normal. He exhibits no mass. There is no tenderness.   Musculoskeletal: He exhibits tenderness. He exhibits no edema.        Right elbow: Tenderness found. Medial epicondyle and olecranon process tenderness noted.        Left knee: He exhibits decreased range of motion and swelling (mild). He exhibits no ecchymosis. Tenderness found. Medial joint line and lateral joint line tenderness noted.        Lumbar back: He exhibits decreased range of motion, tenderness, pain and spasm. He exhibits no swelling.        Right hand: He exhibits tenderness (3rd digit). He exhibits normal capillary refill.     Skin Integrity  -  His right foot skin is intact.His left foot skin is intact..  Lymphadenopathy:        Head (right side): No submandibular adenopathy present.        Head (left side): No submandibular adenopathy present.     He has no cervical adenopathy.   Neurological: He is alert and oriented to person, place, and time. He has normal reflexes. No cranial nerve deficit. He exhibits normal muscle tone. Coordination normal.   Skin: Skin is warm and dry. Capillary refill takes less than 2 seconds. He is not diaphoretic.   Varicosities noted on bilateral lower extremities.    Ecchymosis in varying degrees of color in size on BLE  Resolving rash on thighs and calf area.   Psychiatric: His speech is normal. Judgment and thought content normal. His mood appears not anxious. He is not actively hallucinating. Cognition and memory are normal. He does not exhibit a depressed mood. He expresses no homicidal and no suicidal ideation. He exhibits normal recent memory and normal remote memory.   Laughing and talkative today.   He is attentive.   Vitals reviewed.      Assessment/Plan     Problem List Items Addressed This Visit        Cardiovascular and Mediastinum    Hyperlipidemia       Digestive    Class 1 obesity due  to excess calories with serious comorbidity and body mass index (BMI) of 33.0 to 33.9 in adult - Primary    Current Assessment & Plan     Dietary counseling provided:  Healthy food choices including fruits and vegetables, limit soda and junk foods, adequate water intake.  Increase in physical activity advised at least 30 minutes per day 3-5 days per week.              Nervous and Auditory    Chronic bilateral low back pain with left-sided sciatica    Relevant Medications    HYDROcodone-acetaminophen (NORCO)  MG per tablet       Musculoskeletal and Integument    Chondromalacia, knee    Relevant Medications    HYDROcodone-acetaminophen (NORCO)  MG per tablet        40 minutes spent face to face with patient.  Greater than 50 % of time spent on education/counseling of disease process of hyperlipidemia and food causes, need for medications if warranted, possible need for further testing and/or referral to speciality.  Patient provided time to ask questions and verbalize concerns.     Patient's Body mass index is 32.89 kg/m². BMI is above normal parameters. Recommendations include: nutrition counseling.   (Normal BMI:  18.5-24.9, OW 25-29.9, Obesity 30 or greater)    Understands disease processes and need for medications.  Understands reasons for urgent and emergent care.  Patient (& family) verbalized agreement for treatment plan.   Emotional support and active listening provided.  Patient provided time to verbalize feelings.    CHAVEZ #[id number] reviewed today and consistent.  Will refill prescribed controlled medication today.  Patient is aware they cannot receive narcotics from any other provider except if under care of pain management or speciality clinic.  Risk and benefits of medication use has been reviewed.  History and physical exam exhibit continued safe and appropriate use of controlled substances.  The patient is aware of the potential for addiction and dependence.  This patient has been made  aware of the appropriate use of such medications, including potential risk of somnolence, limited ability to drive and / or work safely, and potential for overdose.    It has also been made clear that these medications are for use by this patient only, without concomitant use of alcohol or other substances unless prescribed/advised by medical provider.  Patient understands they may be subject to UDS and pill counts at random.      Patient considered to be moderate risk for addiction due to use of multiple controlled medications.  Patient understands and accepts these risks.  Patient need for medication will be reassessed at each visit.  Doses will be adjusted according to patient need and findings.    Goal of [place]: Patient will not have any adverse reactions of medication.  Patient will have reduction in his chronic knee and back pain symptoms with use of hydrocodone 10 mg as directed.    Continue under care of ortho for elbow pain/fracture.      RTC 1 month, sooner if needed for problems or concerns          This document has been electronically signed by:  BALDOMERO Thao FNP-C Dragon disclaimer:  Much of this encounter note is an electronic transcription/translation of spoken language to printed text. The electronic translation of spoken language may permit erroneous, or at times, nonsensical words or phrases to be inadvertently transcribed; Although I have reviewed the note for such errors, some may still exist.

## 2019-08-27 ENCOUNTER — OFFICE VISIT (OUTPATIENT)
Dept: SURGERY | Facility: CLINIC | Age: 49
End: 2019-08-27

## 2019-08-27 VITALS — WEIGHT: 210 LBS | BODY MASS INDEX: 32.96 KG/M2 | HEIGHT: 67 IN

## 2019-08-27 DIAGNOSIS — K59.09 OTHER CONSTIPATION: Primary | ICD-10-CM

## 2019-08-27 PROCEDURE — 99212 OFFICE O/P EST SF 10 MIN: CPT | Performed by: SURGERY

## 2019-08-27 NOTE — PROGRESS NOTES
Subjective   Jaquan Mckay is a 48 y.o. male  is here today for follow-up.         Jaquan Mckay is a 48 y.o. male here for follow up for constipation.  Patient had a reaction to a stool softener and is being converted to a different stool softener variety.  He had some improvement with stopping his postoperative opiate persistent constipation was present.  He continues to take narcotics so this will be a lifelong problem unless he is weaned off of his narcotics.          Assessment     Jaquan was seen today for constipation.    Diagnoses and all orders for this visit:    Other constipation      Jaquan Mckay is a 48 y.o. male with constipation likely secondary to narcotic use.  He will attempt to wean his narcotics with his primary care or pain medication provider and continue bowel regimen.

## 2019-08-29 ENCOUNTER — OFFICE VISIT (OUTPATIENT)
Dept: CARDIOLOGY | Facility: CLINIC | Age: 49
End: 2019-08-29

## 2019-08-29 VITALS
HEIGHT: 67 IN | SYSTOLIC BLOOD PRESSURE: 125 MMHG | HEART RATE: 78 BPM | BODY MASS INDEX: 33.74 KG/M2 | OXYGEN SATURATION: 100 % | DIASTOLIC BLOOD PRESSURE: 79 MMHG | WEIGHT: 215 LBS

## 2019-08-29 DIAGNOSIS — I25.10 ASCVD (ARTERIOSCLEROTIC CARDIOVASCULAR DISEASE): Primary | ICD-10-CM

## 2019-08-29 DIAGNOSIS — I10 ESSENTIAL HYPERTENSION: ICD-10-CM

## 2019-08-29 DIAGNOSIS — R55 SYNCOPE AND COLLAPSE: ICD-10-CM

## 2019-08-29 DIAGNOSIS — E78.2 MIXED HYPERLIPIDEMIA: ICD-10-CM

## 2019-08-29 PROCEDURE — 93272 ECG/REVIEW INTERPRET ONLY: CPT | Performed by: NURSE PRACTITIONER

## 2019-08-29 PROCEDURE — 99213 OFFICE O/P EST LOW 20 MIN: CPT | Performed by: NURSE PRACTITIONER

## 2019-08-29 NOTE — PROGRESS NOTES
Tiera Valenzuela APRN  Jaquan Mckay  1970 08/29/2019    Patient Active Problem List   Diagnosis   • Gastroesophageal reflux disease without esophagitis   • Arthritis   • Hypertension   • Seizure disorder (CMS/HCC)   • Hyperlipidemia   • Anxiety   • Varicocele   • Varicocele present on ultrasound of scrotum   • Obesity (BMI 30.0-34.9)   • Chronic bilateral low back pain with left-sided sciatica   • Seasonal allergic rhinitis due to pollen   • Mild persistent asthma with acute exacerbation   • Precordial pain   • Dysuria   • Congenital coronary artery anomaly   • BMI 35.0-35.9,adult   • Chondromalacia, knee   • Achilles tendinitis of both lower extremities   • Muscle spasm of both lower legs   • Personal history of allergy to shellfish   • Sensation of cold in lower extremity   • Varicose vein of leg   • Heart palpitations   • Shortness of breath   • Generalized anxiety disorder   • Chronic elbow pain, right   • Chest pain   • Unstable angina (CMS/HCC)   • ASCVD (arteriosclerotic cardiovascular disease)   • Elevated prolactin level (CMS/[facility])   • Other constipation   • Class 1 obesity due to excess calories with serious comorbidity and body mass index (BMI) of 33.0 to 33.9 in adult       Dear Tiera Valenzuela APRN:    Subjective     Chief Complaint   Patient presents with   • Hypertension   • Results     event monitor could not wear            History of Present Illness:    Jaquan Mckay is a 48 y.o. male with a history of hypertension, hyperlipidemia, and chronic chest pain.  Previously, he reported episodes of syncope several months ago.  A 30-day event monitor was ordered, but the patient reports he could not wear this because he is having some right arm difficulties and could not replace the patch when needed.  He only wore this for a few hours.  This revealed sinus rhythm and sinus tachycardia.  He reports since his last visit here he has had no further syncopal episodes.  He does report a long-standing  "history of seizures and does have an appointment with neurology in the future. He denies any chest pains or shortness of breath today.        Allergies   Allergen Reactions   • Ciprofloxacin Anaphylaxis and Hives   • Paxil [Paroxetine Hcl] Shortness Of Breath     Chest pain    • Peanut-Containing Drug Products Anaphylaxis   • Penicillins Anaphylaxis   • Sulfa Antibiotics Anaphylaxis, Itching and Rash   • Isosorbide Nitrate Rash     Rash, hives, had to use inhaler.    • Doxycycline Hives   • Fish-Derived Products Hives   • Mobic [Meloxicam] Other (See Comments)     Pt states, \"It make my feet and hands go numb and I can't hardly walk.\"    • Pristiq [Desvenlafaxine Succinate Er] Dizziness   • Robitussin Cough+ Chest Max St [Dextromethorphan-Guaifenesin] Unknown (See Comments)     Pt states, \"I don't remember its been so long.\"    • Zoloft [Sertraline Hcl] Hives and Itching   • Clarithromycin Rash   • Contrast Dye Itching and Rash   • Diltiazem Rash   • Gabapentin Rash   • Keflex [Cephalexin] Rash   • Metoprolol Rash   • Prednisone Rash and Other (See Comments)     Face, feet, and legs go completely numb per patient   • Shrimp (Diagnostic) Rash   • Spironolactone Rash   • Viibryd [Vilazodone Hcl] Itching and Rash   :      Current Outpatient Medications:   •  albuterol (PROVENTIL) (2.5 MG/3ML) 0.083% nebulizer solution, Take 2.5 mg by nebulization Every 4 (Four) Hours As Needed for Shortness of Air., Disp: 180 vial, Rfl: 5  •  albuterol sulfate  (90 Base) MCG/ACT inhaler, Inhale 2 puffs Every 4 (Four) Hours As Needed for Shortness of Air., Disp: 1 inhaler, Rfl: 5  •  ASPIRIN ADULT LOW STRENGTH 81 MG EC tablet, TAKE 1 TABLET BY MOUTH DAILY FOR HEART HEALTH AND CIRCULATION, Disp: 30 tablet, Rfl: 3  •  atorvastatin (LIPITOR) 40 MG tablet, Take 1 tablet by mouth Daily., Disp: 90 tablet, Rfl: 3  •  budesonide-formoterol (SYMBICORT) 160-4.5 MCG/ACT inhaler, Inhale 2 puffs 2 (Two) Times a Day., Disp: 1 inhaler, Rfl: " 12  •  busPIRone (BUSPAR) 5 MG tablet, Take 1 tablet by mouth 3 (Three) Times a Day., Disp: 90 tablet, Rfl: 2  •  cetirizine (zyrTEC) 10 MG tablet, Take 1 tablet by mouth Daily., Disp: 30 tablet, Rfl: 5  •  cyclobenzaprine (FLEXERIL) 10 MG tablet, Take 1 tablet by mouth 2 (Two) Times a Day As Needed for Muscle Spasms., Disp: 60 tablet, Rfl: 5  •  diphenhydrAMINE (BENADRYL ALLERGY) 25 MG tablet, Take 1 tablet by mouth Every 6 (Six) Hours As Needed for Itching or Allergies., Disp: 30 tablet, Rfl: 1  •  docusate sodium 250 MG capsule, Take 250 mg by mouth 2 (Two) Times a Day., Disp: 60 capsule, Rfl: 1  •  doxepin (SINEquan) 25 MG capsule, 1 capsule 2-3 hours before bedtime, Disp: 30 capsule, Rfl: 5  •  Elastic Bandages & Supports (ELBOW BRACE) misc, 1 each Daily., Disp: 1 each, Rfl: 0  •  famotidine (PEPCID) 40 MG tablet, Take 1 tablet by mouth Daily., Disp: 30 tablet, Rfl: 5  •  HYDROcodone-acetaminophen (NORCO)  MG per tablet, Take 1 tablet by mouth 2 (Two) Times a Day As Needed for Moderate Pain ., Disp: 60 tablet, Rfl: 0  •  ibuprofen (ADVIL,MOTRIN) 600 MG tablet, Take 1 tablet by mouth 3 (Three) Times a Day As Needed for Mild Pain  or Moderate Pain ., Disp: 60 tablet, Rfl: 0  •  ketoconazole (NIZORAL) 2 % shampoo, Apply  topically to the appropriate area as directed 2 (Two) Times a Week., Disp: 100 mL, Rfl: 1  •  linaclotide (LINZESS) 290 MCG capsule capsule, Take 1 capsule by mouth Every Morning Before Breakfast., Disp: 30 capsule, Rfl: 5  •  lisinopril (PRINIVIL,ZESTRIL) 20 MG tablet, Take 1 tablet by mouth Daily. FOR BLOOD PRESSURE, Disp: 30 tablet, Rfl: 5  •  meclizine (ANTIVERT) 12.5 MG tablet, Take 1 tablet by mouth 3 (Three) Times a Day As Needed for dizziness., Disp: 30 tablet, Rfl: 0  •  montelukast (SINGULAIR) 10 MG tablet, Take 1 tablet by mouth Every Night., Disp: 30 tablet, Rfl: 5  •  mupirocin (BACTROBAN) 2 % ointment, Apply  topically to the appropriate area as directed 3 (Three) Times a Day.,  Disp: 30 g, Rfl: 0  •  pantoprazole (PROTONIX) 40 MG EC tablet, TAKE ONE TABLET BY MOUTH DAILY FOR THE STOMACH, Disp: 30 tablet, Rfl: 3  •  phenytoin (DILANTIN) 100 MG ER capsule, Uses brand name, takes 3 capsules in the morning and 3 in the evening., Disp: 180 capsule, Rfl: 3  •  polyethylene glycol (MIRALAX) powder, Take 17 g by mouth Daily., Disp: 578 g, Rfl: 0  •  potassium chloride (K-DUR) 10 MEQ CR tablet, TAKE 1 TABLET BY MOUTH DAILY, Disp: 30 tablet, Rfl: 5  •  psyllium (METAMUCIL) 58.6 % powder, Take 1 packet by mouth Daily., Disp: 283 g, Rfl: 11  •  Respiratory Therapy Supplies (NEBULIZER/TUBING/MOUTHPIECE) kit, 1 each Take As Directed., Disp: 1 each, Rfl: 0  •  SYMBICORT 160-4.5 MCG/ACT inhaler, INHALE 2 PUFFS BY MOUTH INTO THE LUNGS 2 TIMES A DAY FOR SHORTNESS OF BREATH, Disp: 10.2 g, Rfl: 5      The following portions of the patient's history were reviewed and updated as appropriate: allergies, current medications, past family history, past medical history, past social history, past surgical history and problem list.    Social History     Tobacco Use   • Smoking status: Current Every Day Smoker     Packs/day: 2.00     Years: 17.00     Pack years: 34.00     Types: Cigars, Cigarettes     Start date: 5/5/2010   • Smokeless tobacco: Never Used   Substance Use Topics   • Alcohol use: No   • Drug use: No       Review of Systems   Constitution: Negative for weakness and malaise/fatigue.   Cardiovascular: Positive for irregular heartbeat. Negative for chest pain, dyspnea on exertion, leg swelling, near-syncope, orthopnea, palpitations, paroxysmal nocturnal dyspnea and syncope.   Respiratory: Negative for cough, shortness of breath and wheezing.    Neurological: Negative for dizziness and light-headedness.   All other systems reviewed and are negative.      Objective   Vitals:    08/29/19 1354   BP: 125/79   BP Location: Left arm   Patient Position: Sitting   Cuff Size: Adult   Pulse: 78   SpO2: 100%   Weight:  "97.5 kg (215 lb)   Height: 170.2 cm (67.01\")     Body mass index is 33.67 kg/m².        Physical Exam   Constitutional: He is oriented to person, place, and time. He appears well-developed and well-nourished.   HENT:   Head: Normocephalic and atraumatic.   Cardiovascular: Normal rate, regular rhythm and normal heart sounds. Exam reveals no S3 and no S4.   No murmur heard.  Pulmonary/Chest: Effort normal and breath sounds normal. He has no wheezes. He has no rales.   Abdominal: Soft. Bowel sounds are normal.   Musculoskeletal: He exhibits no edema.   Immobilizer noted right arm   Neurological: He is alert and oriented to person, place, and time.   Skin: Skin is warm and dry.   Psychiatric: He has a normal mood and affect. His behavior is normal.       Lab Results   Component Value Date     08/12/2019    K 4.1 08/12/2019    CL 98 08/12/2019    CO2 25.8 08/12/2019    BUN 14 08/12/2019    CREATININE 0.89 08/12/2019    GLUCOSE 90 08/12/2019    CALCIUM 9.3 08/12/2019    AST 19 08/12/2019    ALT 14 08/12/2019    ALKPHOS 66 08/12/2019    LABIL2 1.1 (L) 05/29/2016     Lab Results   Component Value Date    CKTOTAL 153 06/20/2019     Lab Results   Component Value Date    WBC 6.16 08/06/2019    HGB 15.3 08/06/2019    HCT 46.3 08/06/2019     08/06/2019     Lab Results   Component Value Date    INR 1.03 02/06/2018    INR 0.97 09/26/2017    INR 0.99 12/20/2016     Lab Results   Component Value Date    MG 1.8 08/06/2019     Lab Results   Component Value Date    TSH 2.110 08/06/2019    PSA 0.8 08/06/2019    CHLPL 232 (H) 04/05/2016    TRIG 50 08/12/2019    HDL 81 (H) 08/12/2019     (H) 08/12/2019      Lab Results   Component Value Date    BNP 3.0 09/26/2018           Procedures      Assessment/Plan    Diagnosis Plan   1. ASCVD (arteriosclerotic cardiovascular disease)     2. Essential hypertension     3. Mixed hyperlipidemia     4. Syncope and collapse  Cardiac Event Monitor                Recommendations:    1.  " I have reviewed the available strips from the event monitor with him today.  He reports at this time he does not feel he can wear the event monitor and will let us know in the future if this is an option.    2.  I have encouraged him to follow-up with neurology and asked him let us know if he has any further syncopal episodes.    3.  Follow-up in 6 months and if needed.      Return in about 6 months (around 2/29/2020) for Recheck.    As always, I appreciate very much the opportunity to participate in the cardiovascular care of your patients.      With Best Regards,    BALDOMERO Horowitz

## 2019-09-05 ENCOUNTER — TELEPHONE (OUTPATIENT)
Dept: FAMILY MEDICINE CLINIC | Facility: CLINIC | Age: 49
End: 2019-09-05

## 2019-09-05 NOTE — TELEPHONE ENCOUNTER
Called patient to come in for a random UDS and pill count. Patient does not have a voicemail set up to leave message.

## 2019-09-16 ENCOUNTER — OFFICE VISIT (OUTPATIENT)
Dept: FAMILY MEDICINE CLINIC | Facility: CLINIC | Age: 49
End: 2019-09-16

## 2019-09-16 VITALS
OXYGEN SATURATION: 93 % | WEIGHT: 213 LBS | BODY MASS INDEX: 33.43 KG/M2 | HEIGHT: 67 IN | TEMPERATURE: 97.9 F | DIASTOLIC BLOOD PRESSURE: 82 MMHG | HEART RATE: 79 BPM | SYSTOLIC BLOOD PRESSURE: 130 MMHG

## 2019-09-16 DIAGNOSIS — J45.30 MILD PERSISTENT ASTHMA WITHOUT COMPLICATION: ICD-10-CM

## 2019-09-16 DIAGNOSIS — G89.29 CHRONIC BILATERAL LOW BACK PAIN WITH LEFT-SIDED SCIATICA: ICD-10-CM

## 2019-09-16 DIAGNOSIS — E66.09 CLASS 1 OBESITY DUE TO EXCESS CALORIES WITH SERIOUS COMORBIDITY AND BODY MASS INDEX (BMI) OF 33.0 TO 33.9 IN ADULT: ICD-10-CM

## 2019-09-16 DIAGNOSIS — I10 ESSENTIAL HYPERTENSION: ICD-10-CM

## 2019-09-16 DIAGNOSIS — J06.9 ACUTE URI: ICD-10-CM

## 2019-09-16 DIAGNOSIS — J45.40 MODERATE PERSISTENT ASTHMA WITHOUT COMPLICATION: ICD-10-CM

## 2019-09-16 DIAGNOSIS — M94.262 CHONDROMALACIA OF LEFT KNEE: ICD-10-CM

## 2019-09-16 DIAGNOSIS — J45.20 MILD INTERMITTENT ASTHMA WITHOUT COMPLICATION: ICD-10-CM

## 2019-09-16 DIAGNOSIS — M54.42 CHRONIC BILATERAL LOW BACK PAIN WITH LEFT-SIDED SCIATICA: ICD-10-CM

## 2019-09-16 DIAGNOSIS — E87.6 HYPOKALEMIA: ICD-10-CM

## 2019-09-16 DIAGNOSIS — K21.9 GASTROESOPHAGEAL REFLUX DISEASE WITHOUT ESOPHAGITIS: ICD-10-CM

## 2019-09-16 DIAGNOSIS — Z12.11 COLON CANCER SCREENING: Primary | ICD-10-CM

## 2019-09-16 DIAGNOSIS — M62.838 MUSCLE SPASM OF BOTH LOWER LEGS: ICD-10-CM

## 2019-09-16 PROCEDURE — 99214 OFFICE O/P EST MOD 30 MIN: CPT | Performed by: NURSE PRACTITIONER

## 2019-09-16 RX ORDER — BUDESONIDE AND FORMOTEROL FUMARATE DIHYDRATE 160; 4.5 UG/1; UG/1
2 AEROSOL RESPIRATORY (INHALATION) 2 TIMES DAILY
Qty: 10.2 G | Refills: 5 | Status: SHIPPED | OUTPATIENT
Start: 2019-09-16 | End: 2020-02-13 | Stop reason: SDUPTHER

## 2019-09-16 RX ORDER — PHENYTOIN SODIUM 100 MG/1
CAPSULE, EXTENDED RELEASE ORAL
Qty: 180 CAPSULE | Refills: 3 | Status: SHIPPED | OUTPATIENT
Start: 2019-09-16 | End: 2019-10-01 | Stop reason: HOSPADM

## 2019-09-16 RX ORDER — PANTOPRAZOLE SODIUM 40 MG/1
40 TABLET, DELAYED RELEASE ORAL DAILY
Qty: 30 TABLET | Refills: 5 | Status: SHIPPED | OUTPATIENT
Start: 2019-09-16 | End: 2020-02-13 | Stop reason: SDUPTHER

## 2019-09-16 RX ORDER — HYDROCODONE BITARTRATE AND ACETAMINOPHEN 10; 325 MG/1; MG/1
1 TABLET ORAL 2 TIMES DAILY PRN
Qty: 60 TABLET | Refills: 0 | Status: SHIPPED | OUTPATIENT
Start: 2019-09-16 | End: 2019-10-15 | Stop reason: SDUPTHER

## 2019-09-16 RX ORDER — ALBUTEROL SULFATE 90 UG/1
2 AEROSOL, METERED RESPIRATORY (INHALATION) EVERY 4 HOURS PRN
Qty: 1 INHALER | Refills: 5 | Status: SHIPPED | OUTPATIENT
Start: 2019-09-16 | End: 2020-04-14 | Stop reason: SDUPTHER

## 2019-09-16 RX ORDER — POTASSIUM CHLORIDE 750 MG/1
10 TABLET, FILM COATED, EXTENDED RELEASE ORAL DAILY
Qty: 30 TABLET | Refills: 5 | Status: SHIPPED | OUTPATIENT
Start: 2019-09-16 | End: 2019-09-25

## 2019-09-16 RX ORDER — MECLIZINE HCL 12.5 MG/1
12.5 TABLET ORAL 3 TIMES DAILY PRN
Qty: 30 TABLET | Refills: 0 | Status: SHIPPED | OUTPATIENT
Start: 2019-09-16 | End: 2020-04-20 | Stop reason: ALTCHOICE

## 2019-09-16 RX ORDER — LISINOPRIL 20 MG/1
20 TABLET ORAL DAILY
Qty: 30 TABLET | Refills: 5 | Status: SHIPPED | OUTPATIENT
Start: 2019-09-16 | End: 2019-10-10 | Stop reason: SDUPTHER

## 2019-09-16 RX ORDER — FAMOTIDINE 40 MG/1
40 TABLET, FILM COATED ORAL DAILY
Qty: 30 TABLET | Refills: 5 | Status: SHIPPED | OUTPATIENT
Start: 2019-09-16 | End: 2019-09-25

## 2019-09-16 RX ORDER — MONTELUKAST SODIUM 10 MG/1
10 TABLET ORAL NIGHTLY
Qty: 30 TABLET | Refills: 5 | Status: SHIPPED | OUTPATIENT
Start: 2019-09-16 | End: 2020-06-10 | Stop reason: SDUPTHER

## 2019-09-16 RX ORDER — IBUPROFEN 600 MG/1
600 TABLET ORAL 3 TIMES DAILY PRN
Qty: 60 TABLET | Refills: 5 | Status: SHIPPED | OUTPATIENT
Start: 2019-09-16 | End: 2019-10-01 | Stop reason: HOSPADM

## 2019-09-16 RX ORDER — CYCLOBENZAPRINE HCL 10 MG
10 TABLET ORAL 2 TIMES DAILY PRN
Qty: 60 TABLET | Refills: 5 | Status: ON HOLD | OUTPATIENT
Start: 2019-09-16 | End: 2019-09-25

## 2019-09-16 NOTE — PROGRESS NOTES
"Subjective   Jaquan Mckay is a 48 y.o. male.     Chief Complaint   Patient presents with   • Elbow Pain   • Obesity       History of Present Illness     Elbow Pain-reports he has had an MRI of elbow.  He reports he has the report but he has not been back to orthopedic.  He reports his brace broke and he is in an ACE wrap at present.  He reports shooting to my finger tips.  He reports he is not able to travel due to transportation.  He has been under the care of Dr Dubin but reports he is out of the office on \"medical leave\".    He refuses to go to [place] and [place].  He request not to see anyone at [facility].  He   GI referral-request a referral to GI.  He has had a scope in the last 2-3 years.  Reports he was suppose to follow up for colonoscopy but has not since Dr Herrmann.    Tick bite-leftt hand 4th digit and on his left forearm.  He reports got them \"in the yard\".  He reports he removed the ticks.  He reports he has noted some redness on his finger.  He reports he is concerned due to his past \"staph infections\".   Has been present for 3-4 days.      The following portions of the patient's history were reviewed and updated as appropriate: allergies, current medications, past family history, past medical history, past social history, past surgical history and problem list.    Review of Systems   Constitutional: Positive for fatigue. Negative for appetite change and unexpected weight change.   HENT: Negative for congestion, ear pain, nosebleeds, postnasal drip, rhinorrhea, sinus pressure, sinus pain, sore throat, trouble swallowing and voice change.    Eyes: Negative for pain and visual disturbance.   Respiratory: Positive for cough and shortness of breath. Negative for chest tightness and wheezing.    Cardiovascular: Negative for chest pain, palpitations and leg swelling.   Gastrointestinal: Positive for abdominal pain, constipation and rectal pain. Negative for blood in stool and diarrhea.   Endocrine: Negative " "for cold intolerance and polydipsia.   Genitourinary: Negative for difficulty urinating, dysuria, flank pain and hematuria.   Musculoskeletal: Positive for arthralgias, back pain and myalgias. Negative for gait problem and joint swelling.   Skin: Negative for color change and rash.   Allergic/Immunologic: Negative.    Neurological: Positive for headaches. Negative for syncope and numbness.   Hematological: Negative.    Psychiatric/Behavioral: Positive for dysphoric mood and sleep disturbance. Negative for suicidal ideas. The patient is not nervous/anxious.    All other systems reviewed and are negative.      Objective     /82   Pulse 79   Temp 97.9 °F (36.6 °C) (Temporal)   Ht 170.2 cm (67\")   Wt 96.6 kg (213 lb)   SpO2 93%   BMI 33.36 kg/m²     Physical Exam   Constitutional: He is oriented to person, place, and time. He appears well-developed and well-nourished. No distress.   HENT:   Head: Normocephalic and atraumatic.   Right Ear: External ear and ear canal normal. Tympanic membrane is not erythematous.   Left Ear: External ear and ear canal normal. Tympanic membrane is not erythematous.   Nose: Mucosal edema and rhinorrhea present.   Mouth/Throat: Oropharynx is clear and moist. No oropharyngeal exudate or posterior oropharyngeal erythema.   Eyes: Conjunctivae and EOM are normal. Pupils are equal, round, and reactive to light. No scleral icterus.   Neck: Normal range of motion. Neck supple. No JVD present. No thyromegaly present.   Cardiovascular: Normal rate, regular rhythm and normal heart sounds.   No murmur heard.  Pulmonary/Chest: Effort normal and breath sounds normal. No respiratory distress. He has no decreased breath sounds. He has no wheezes. He exhibits no tenderness.   Abdominal: Soft. Bowel sounds are normal. He exhibits no mass. There is no tenderness.   Musculoskeletal: He exhibits tenderness. He exhibits no edema.        Right elbow: Tenderness found. Medial epicondyle and olecranon " process tenderness noted.        Left knee: He exhibits decreased range of motion and swelling (mild). He exhibits no ecchymosis. Tenderness found. Medial joint line and lateral joint line tenderness noted.        Lumbar back: He exhibits decreased range of motion, tenderness, pain and spasm. He exhibits no swelling.        Right hand: He exhibits tenderness (3rd digit). He exhibits normal capillary refill.     Skin Integrity  -  His right foot skin is intact.His left foot skin is intact..  Lymphadenopathy:        Head (right side): No submandibular adenopathy present.        Head (left side): No submandibular adenopathy present.     He has no cervical adenopathy.   Neurological: He is alert and oriented to person, place, and time. He has normal reflexes. No cranial nerve deficit. He exhibits normal muscle tone. Coordination normal.   Skin: Skin is warm and dry. Capillary refill takes less than 2 seconds. He is not diaphoretic.   Varicosities noted on bilateral lower extremities.    Ecchymosis in varying degrees of color in size on BLE     Psychiatric: His speech is normal. Judgment and thought content normal. His mood appears not anxious. He is not actively hallucinating. Cognition and memory are normal. He does not exhibit a depressed mood. He expresses no homicidal and no suicidal ideation. He exhibits normal recent memory and normal remote memory.   Laughing and talkative today.   He is attentive.   Vitals reviewed.      Assessment/Plan     Problem List Items Addressed This Visit        Cardiovascular and Mediastinum    Hypertension    Relevant Medications    lisinopril (PRINIVIL,ZESTRIL) 20 MG tablet       Digestive    Gastroesophageal reflux disease without esophagitis    Relevant Medications    famotidine (PEPCID) 40 MG tablet    pantoprazole (PROTONIX) 40 MG EC tablet    Class 1 obesity due to excess calories with serious comorbidity and body mass index (BMI) of 33.0 to 33.9 in adult    Relevant Medications     ibuprofen (ADVIL,MOTRIN) 600 MG tablet       Nervous and Auditory    Chronic bilateral low back pain with left-sided sciatica    Relevant Medications    HYDROcodone-acetaminophen (NORCO)  MG per tablet       Musculoskeletal and Integument    Chondromalacia, knee    Relevant Medications    HYDROcodone-acetaminophen (NORCO)  MG per tablet    Muscle spasm of both lower legs    Relevant Medications    cyclobenzaprine (FLEXERIL) 10 MG tablet      Other Visit Diagnoses     Colon cancer screening    -  Primary    Relevant Orders    Ambulatory Referral to Gastroenterology    Hypokalemia        Relevant Medications    potassium chloride (K-DUR) 10 MEQ CR tablet    Acute URI        Relevant Medications    meclizine (ANTIVERT) 12.5 MG tablet    Moderate persistent asthma without complication        Relevant Medications    budesonide-formoterol (SYMBICORT) 160-4.5 MCG/ACT inhaler    montelukast (SINGULAIR) 10 MG tablet    albuterol sulfate  (90 Base) MCG/ACT inhaler    Mild intermittent asthma without complication        Relevant Medications    budesonide-formoterol (SYMBICORT) 160-4.5 MCG/ACT inhaler    montelukast (SINGULAIR) 10 MG tablet    albuterol sulfate  (90 Base) MCG/ACT inhaler    Mild persistent asthma without complication        Relevant Medications    budesonide-formoterol (SYMBICORT) 160-4.5 MCG/ACT inhaler    montelukast (SINGULAIR) 10 MG tablet    albuterol sulfate  (90 Base) MCG/ACT inhaler          Patient's Body mass index is 33.36 kg/m². BMI is above normal parameters. Recommendations include: nutrition counseling.   (Normal BMI:  18.5-24.9, OW 25-29.9, Obesity 30 or greater)    Understands disease processes and need for medications.  Understands reasons for urgent and emergent care.  Patient (& family) verbalized agreement for treatment plan.   Emotional support and active listening provided.  Patient provided time to verbalize feelings.  CHAVEZ #[id number] reviewed today  and consistent.  Will refill prescribed controlled medication today.  Patient is aware they cannot receive narcotics from any other provider except if under care of pain management or speciality clinic.  Risk and benefits of medication use has been reviewed.  History and physical exam exhibit continued safe and appropriate use of controlled substances.  The patient is aware of the potential for addiction and dependence.  This patient has been made aware of the appropriate use of such medications, including potential risk of somnolence, limited ability to drive and / or work safely, and potential for overdose.    It has also been made clear that these medications are for use by this patient only, without concomitant use of alcohol or other substances unless prescribed/advised by medical provider.  Patient understands they may be subject to UDS and pill counts at random.    Patient considered to be low risk for addiction due to use of single controlled medications.  Patient understands and accepts these risks.  Patient need for medication will be reassessed at each visit.  Doses will be adjusted according to patient need and findings.    Goal of TX: Patient will not have any adverse reactions of medication.  Patient will have reduction in his chronic knee pain symptoms with use of hydrocodone PRN.    Declines flu vaccine today.  Would like to wait till next month.    Refill on routine maintenance meds today.     Patient request to be referred to [facility].      RTC 1 month, sooner if needed for problems or concerns        This document has been electronically signed by:  BALDOMERO Thao, FNP-C    Dragon disclaimer:  Much of this encounter note is an electronic transcription/translation of spoken language to printed text. The electronic translation of spoken language may permit erroneous, or at times, nonsensical words or phrases to be inadvertently transcribed; Although I have reviewed the note for such  errors, some may still exist.

## 2019-09-24 ENCOUNTER — HOSPITAL ENCOUNTER (EMERGENCY)
Facility: HOSPITAL | Age: 49
Discharge: HOME | End: 2019-09-24
Attending: FAMILY MEDICINE | Admitting: FAMILY MEDICINE

## 2019-09-24 ENCOUNTER — APPOINTMENT (OUTPATIENT)
Dept: CT IMAGING | Facility: HOSPITAL | Age: 49
End: 2019-09-24

## 2019-09-24 ENCOUNTER — APPOINTMENT (OUTPATIENT)
Dept: GENERAL RADIOLOGY | Facility: HOSPITAL | Age: 49
End: 2019-09-24

## 2019-09-24 VITALS
DIASTOLIC BLOOD PRESSURE: 91 MMHG | OXYGEN SATURATION: 99 % | HEART RATE: 78 BPM | HEIGHT: 67 IN | WEIGHT: 211 LBS | SYSTOLIC BLOOD PRESSURE: 148 MMHG | TEMPERATURE: 98.1 F | RESPIRATION RATE: 18 BRPM | BODY MASS INDEX: 33.12 KG/M2

## 2019-09-24 DIAGNOSIS — R55 NEAR SYNCOPE: Primary | ICD-10-CM

## 2019-09-24 DIAGNOSIS — R78.89 ELEVATED PHENYTOIN LEVEL: ICD-10-CM

## 2019-09-24 LAB
6-ACETYL MORPHINE: NEGATIVE
ALBUMIN SERPL-MCNC: 4.29 G/DL (ref 3.5–5.2)
ALBUMIN/GLOB SERPL: 1.2 G/DL
ALP SERPL-CCNC: 82 U/L (ref 39–117)
ALT SERPL W P-5'-P-CCNC: 18 U/L (ref 1–41)
AMPHET+METHAMPHET UR QL: NEGATIVE
ANION GAP SERPL CALCULATED.3IONS-SCNC: 11 MMOL/L (ref 5–15)
APTT PPP: 25.9 SECONDS (ref 23.8–36.1)
AST SERPL-CCNC: 23 U/L (ref 1–40)
BACTERIA BLD CULT: ABNORMAL
BARBITURATES UR QL SCN: NEGATIVE
BASOPHILS # BLD AUTO: 0.02 10*3/MM3 (ref 0–0.2)
BASOPHILS NFR BLD AUTO: 0.3 % (ref 0–1.5)
BENZODIAZ UR QL SCN: NEGATIVE
BILIRUB SERPL-MCNC: 0.2 MG/DL (ref 0.2–1.2)
BILIRUB UR QL STRIP: NEGATIVE
BOTTLE TYPE: ABNORMAL
BUN BLD-MCNC: 11 MG/DL (ref 6–20)
BUN/CREAT SERPL: 11.8 (ref 7–25)
BUPRENORPHINE SERPL-MCNC: NEGATIVE NG/ML
CALCIUM SPEC-SCNC: 8.8 MG/DL (ref 8.6–10.5)
CANNABINOIDS SERPL QL: NEGATIVE
CHLORIDE SERPL-SCNC: 99 MMOL/L (ref 98–107)
CLARITY UR: CLEAR
CO2 SERPL-SCNC: 25 MMOL/L (ref 22–29)
COCAINE UR QL: NEGATIVE
COLOR UR: ABNORMAL
CREAT BLD-MCNC: 0.93 MG/DL (ref 0.76–1.27)
CRP SERPL-MCNC: 0.72 MG/DL (ref 0–0.5)
D DIMER PPP FEU-MCNC: <0.27 MCGFEU/ML (ref 0–0.5)
D-LACTATE SERPL-SCNC: 1.3 MMOL/L (ref 0.5–2)
DEPRECATED RDW RBC AUTO: 45.4 FL (ref 37–54)
EOSINOPHIL # BLD AUTO: 0.11 10*3/MM3 (ref 0–0.4)
EOSINOPHIL NFR BLD AUTO: 1.8 % (ref 0.3–6.2)
ERYTHROCYTE [DISTWIDTH] IN BLOOD BY AUTOMATED COUNT: 13.2 % (ref 12.3–15.4)
GFR SERPL CREATININE-BSD FRML MDRD: 87 ML/MIN/1.73
GLOBULIN UR ELPH-MCNC: 3.7 GM/DL
GLUCOSE BLD-MCNC: 108 MG/DL (ref 65–99)
GLUCOSE UR STRIP-MCNC: NEGATIVE MG/DL
HCT VFR BLD AUTO: 44.3 % (ref 37.5–51)
HGB BLD-MCNC: 15.5 G/DL (ref 13–17.7)
HGB UR QL STRIP.AUTO: NEGATIVE
IMM GRANULOCYTES # BLD AUTO: 0.01 10*3/MM3 (ref 0–0.05)
IMM GRANULOCYTES NFR BLD AUTO: 0.2 % (ref 0–0.5)
INR PPP: 0.98 (ref 0.9–1.1)
KETONES UR QL STRIP: NEGATIVE
LEUKOCYTE ESTERASE UR QL STRIP.AUTO: NEGATIVE
LIPASE SERPL-CCNC: 26 U/L (ref 13–60)
LYMPHOCYTES # BLD AUTO: 2.11 10*3/MM3 (ref 0.7–3.1)
LYMPHOCYTES NFR BLD AUTO: 33.7 % (ref 19.6–45.3)
MCH RBC QN AUTO: 33.3 PG (ref 26.6–33)
MCHC RBC AUTO-ENTMCNC: 35 G/DL (ref 31.5–35.7)
MCV RBC AUTO: 95.1 FL (ref 79–97)
METHADONE UR QL SCN: NEGATIVE
MONOCYTES # BLD AUTO: 0.95 10*3/MM3 (ref 0.1–0.9)
MONOCYTES NFR BLD AUTO: 15.2 % (ref 5–12)
NEUTROPHILS # BLD AUTO: 3.07 10*3/MM3 (ref 1.7–7)
NEUTROPHILS NFR BLD AUTO: 48.8 % (ref 42.7–76)
NITRITE UR QL STRIP: NEGATIVE
NT-PROBNP SERPL-MCNC: 13 PG/ML (ref 5–450)
OPIATES UR QL: NEGATIVE
OXYCODONE UR QL SCN: NEGATIVE
PCP UR QL SCN: NEGATIVE
PH UR STRIP.AUTO: <=5 [PH] (ref 5–8)
PHENYTOIN SERPL-MCNC: 26.4 MCG/ML (ref 10–20)
PLATELET # BLD AUTO: 189 10*3/MM3 (ref 140–450)
PMV BLD AUTO: 9.9 FL (ref 6–12)
POTASSIUM BLD-SCNC: 4 MMOL/L (ref 3.5–5.2)
PROT SERPL-MCNC: 8 G/DL (ref 6–8.5)
PROT UR QL STRIP: NEGATIVE
PROTHROMBIN TIME: 13.5 SECONDS (ref 11–15.4)
RBC # BLD AUTO: 4.66 10*6/MM3 (ref 4.14–5.8)
SODIUM BLD-SCNC: 135 MMOL/L (ref 136–145)
SP GR UR STRIP: 1.03 (ref 1–1.03)
TROPONIN T SERPL-MCNC: <0.01 NG/ML (ref 0–0.03)
UROBILINOGEN UR QL STRIP: ABNORMAL
WBC NRBC COR # BLD: 6.27 10*3/MM3 (ref 3.4–10.8)

## 2019-09-24 PROCEDURE — 80053 COMPREHEN METABOLIC PANEL: CPT | Performed by: FAMILY MEDICINE

## 2019-09-24 PROCEDURE — 80307 DRUG TEST PRSMV CHEM ANLYZR: CPT | Performed by: FAMILY MEDICINE

## 2019-09-24 PROCEDURE — 83880 ASSAY OF NATRIURETIC PEPTIDE: CPT | Performed by: FAMILY MEDICINE

## 2019-09-24 PROCEDURE — 99284 EMERGENCY DEPT VISIT MOD MDM: CPT

## 2019-09-24 PROCEDURE — 87150 DNA/RNA AMPLIFIED PROBE: CPT | Performed by: FAMILY MEDICINE

## 2019-09-24 PROCEDURE — 80185 ASSAY OF PHENYTOIN TOTAL: CPT | Performed by: FAMILY MEDICINE

## 2019-09-24 PROCEDURE — 85610 PROTHROMBIN TIME: CPT | Performed by: FAMILY MEDICINE

## 2019-09-24 PROCEDURE — 93010 ELECTROCARDIOGRAM REPORT: CPT | Performed by: INTERNAL MEDICINE

## 2019-09-24 PROCEDURE — 96361 HYDRATE IV INFUSION ADD-ON: CPT

## 2019-09-24 PROCEDURE — 85025 COMPLETE CBC W/AUTO DIFF WBC: CPT | Performed by: FAMILY MEDICINE

## 2019-09-24 PROCEDURE — 87076 CULTURE ANAEROBE IDENT EACH: CPT | Performed by: FAMILY MEDICINE

## 2019-09-24 PROCEDURE — 84484 ASSAY OF TROPONIN QUANT: CPT | Performed by: FAMILY MEDICINE

## 2019-09-24 PROCEDURE — 72050 X-RAY EXAM NECK SPINE 4/5VWS: CPT

## 2019-09-24 PROCEDURE — 85379 FIBRIN DEGRADATION QUANT: CPT | Performed by: FAMILY MEDICINE

## 2019-09-24 PROCEDURE — 87186 SC STD MICRODIL/AGAR DIL: CPT | Performed by: FAMILY MEDICINE

## 2019-09-24 PROCEDURE — 86140 C-REACTIVE PROTEIN: CPT | Performed by: FAMILY MEDICINE

## 2019-09-24 PROCEDURE — 96360 HYDRATION IV INFUSION INIT: CPT

## 2019-09-24 PROCEDURE — 85730 THROMBOPLASTIN TIME PARTIAL: CPT | Performed by: FAMILY MEDICINE

## 2019-09-24 PROCEDURE — 87040 BLOOD CULTURE FOR BACTERIA: CPT | Performed by: FAMILY MEDICINE

## 2019-09-24 PROCEDURE — 83605 ASSAY OF LACTIC ACID: CPT | Performed by: FAMILY MEDICINE

## 2019-09-24 PROCEDURE — 70450 CT HEAD/BRAIN W/O DYE: CPT

## 2019-09-24 PROCEDURE — 93005 ELECTROCARDIOGRAM TRACING: CPT | Performed by: FAMILY MEDICINE

## 2019-09-24 PROCEDURE — 83690 ASSAY OF LIPASE: CPT | Performed by: FAMILY MEDICINE

## 2019-09-24 PROCEDURE — 81003 URINALYSIS AUTO W/O SCOPE: CPT | Performed by: FAMILY MEDICINE

## 2019-09-24 RX ORDER — SODIUM CHLORIDE 9 MG/ML
125 INJECTION, SOLUTION INTRAVENOUS CONTINUOUS
Status: DISCONTINUED | OUTPATIENT
Start: 2019-09-24 | End: 2019-09-24 | Stop reason: HOSPADM

## 2019-09-24 RX ORDER — SODIUM CHLORIDE 0.9 % (FLUSH) 0.9 %
10 SYRINGE (ML) INJECTION AS NEEDED
Status: DISCONTINUED | OUTPATIENT
Start: 2019-09-24 | End: 2019-09-24 | Stop reason: HOSPADM

## 2019-09-24 RX ADMIN — SODIUM CHLORIDE 125 ML/HR: 9 INJECTION, SOLUTION INTRAVENOUS at 05:32

## 2019-09-25 ENCOUNTER — APPOINTMENT (OUTPATIENT)
Dept: GENERAL RADIOLOGY | Facility: HOSPITAL | Age: 49
End: 2019-09-25

## 2019-09-25 ENCOUNTER — HOSPITAL ENCOUNTER (INPATIENT)
Facility: HOSPITAL | Age: 49
LOS: 6 days | Discharge: HOME | End: 2019-10-01
Attending: EMERGENCY MEDICINE | Admitting: INTERNAL MEDICINE

## 2019-09-25 ENCOUNTER — TELEPHONE (OUTPATIENT)
Dept: EMERGENCY DEPT | Facility: HOSPITAL | Age: 49
End: 2019-09-25

## 2019-09-25 DIAGNOSIS — R78.81 BACTEREMIA: Primary | ICD-10-CM

## 2019-09-25 LAB
6-ACETYL MORPHINE: NEGATIVE
ALBUMIN SERPL-MCNC: 4.67 G/DL (ref 3.5–5.2)
ALBUMIN/GLOB SERPL: 1.3 G/DL
ALP SERPL-CCNC: 84 U/L (ref 39–117)
ALT SERPL W P-5'-P-CCNC: 21 U/L (ref 1–41)
AMPHET+METHAMPHET UR QL: NEGATIVE
ANION GAP SERPL CALCULATED.3IONS-SCNC: 11.2 MMOL/L (ref 5–15)
AST SERPL-CCNC: 21 U/L (ref 1–40)
BACTERIA BLD CULT: ABNORMAL
BACTERIA UR QL AUTO: NORMAL /HPF
BARBITURATES UR QL SCN: NEGATIVE
BASOPHILS # BLD AUTO: 0.01 10*3/MM3 (ref 0–0.2)
BASOPHILS NFR BLD AUTO: 0.1 % (ref 0–1.5)
BENZODIAZ UR QL SCN: NEGATIVE
BILIRUB SERPL-MCNC: 0.2 MG/DL (ref 0.2–1.2)
BILIRUB UR QL STRIP: NEGATIVE
BOTTLE TYPE: ABNORMAL
BUN BLD-MCNC: 9 MG/DL (ref 6–20)
BUN/CREAT SERPL: 10.6 (ref 7–25)
BUPRENORPHINE SERPL-MCNC: NEGATIVE NG/ML
CALCIUM SPEC-SCNC: 9.5 MG/DL (ref 8.6–10.5)
CANNABINOIDS SERPL QL: NEGATIVE
CHLORIDE SERPL-SCNC: 99 MMOL/L (ref 98–107)
CLARITY UR: CLEAR
CO2 SERPL-SCNC: 28.8 MMOL/L (ref 22–29)
COCAINE UR QL: NEGATIVE
COLOR UR: YELLOW
CREAT BLD-MCNC: 0.85 MG/DL (ref 0.76–1.27)
D-LACTATE SERPL-SCNC: 1.3 MMOL/L (ref 0.5–2)
DEPRECATED RDW RBC AUTO: 43.6 FL (ref 37–54)
EOSINOPHIL # BLD AUTO: 0.05 10*3/MM3 (ref 0–0.4)
EOSINOPHIL NFR BLD AUTO: 0.7 % (ref 0.3–6.2)
ERYTHROCYTE [DISTWIDTH] IN BLOOD BY AUTOMATED COUNT: 13.1 % (ref 12.3–15.4)
GFR SERPL CREATININE-BSD FRML MDRD: 97 ML/MIN/1.73
GLOBULIN UR ELPH-MCNC: 3.6 GM/DL
GLUCOSE BLD-MCNC: 99 MG/DL (ref 65–99)
GLUCOSE UR STRIP-MCNC: NEGATIVE MG/DL
HCT VFR BLD AUTO: 44 % (ref 37.5–51)
HGB BLD-MCNC: 15.4 G/DL (ref 13–17.7)
HGB UR QL STRIP.AUTO: ABNORMAL
HYALINE CASTS UR QL AUTO: NORMAL /LPF
IMM GRANULOCYTES # BLD AUTO: 0.01 10*3/MM3 (ref 0–0.05)
IMM GRANULOCYTES NFR BLD AUTO: 0.1 % (ref 0–0.5)
KETONES UR QL STRIP: NEGATIVE
LEUKOCYTE ESTERASE UR QL STRIP.AUTO: NEGATIVE
LYMPHOCYTES # BLD AUTO: 2 10*3/MM3 (ref 0.7–3.1)
LYMPHOCYTES NFR BLD AUTO: 27.5 % (ref 19.6–45.3)
MCH RBC QN AUTO: 33 PG (ref 26.6–33)
MCHC RBC AUTO-ENTMCNC: 35 G/DL (ref 31.5–35.7)
MCV RBC AUTO: 94.4 FL (ref 79–97)
METHADONE UR QL SCN: NEGATIVE
MONOCYTES # BLD AUTO: 0.8 10*3/MM3 (ref 0.1–0.9)
MONOCYTES NFR BLD AUTO: 11 % (ref 5–12)
NEUTROPHILS # BLD AUTO: 4.41 10*3/MM3 (ref 1.7–7)
NEUTROPHILS NFR BLD AUTO: 60.6 % (ref 42.7–76)
NITRITE UR QL STRIP: NEGATIVE
OPIATES UR QL: NEGATIVE
OXYCODONE UR QL SCN: NEGATIVE
PCP UR QL SCN: NEGATIVE
PH UR STRIP.AUTO: 7 [PH] (ref 5–8)
PLATELET # BLD AUTO: 201 10*3/MM3 (ref 140–450)
PMV BLD AUTO: 9.6 FL (ref 6–12)
POTASSIUM BLD-SCNC: 4 MMOL/L (ref 3.5–5.2)
PROT SERPL-MCNC: 8.3 G/DL (ref 6–8.5)
PROT UR QL STRIP: NEGATIVE
RBC # BLD AUTO: 4.66 10*6/MM3 (ref 4.14–5.8)
RBC # UR: NORMAL /HPF
REF LAB TEST METHOD: NORMAL
SODIUM BLD-SCNC: 139 MMOL/L (ref 136–145)
SP GR UR STRIP: 1.01 (ref 1–1.03)
SQUAMOUS #/AREA URNS HPF: NORMAL /HPF
TROPONIN T SERPL-MCNC: <0.01 NG/ML (ref 0–0.03)
UROBILINOGEN UR QL STRIP: ABNORMAL
WBC NRBC COR # BLD: 7.28 10*3/MM3 (ref 3.4–10.8)
WBC UR QL AUTO: NORMAL /HPF

## 2019-09-25 PROCEDURE — 80307 DRUG TEST PRSMV CHEM ANLYZR: CPT | Performed by: PHYSICIAN ASSISTANT

## 2019-09-25 PROCEDURE — 93005 ELECTROCARDIOGRAM TRACING: CPT | Performed by: INTERNAL MEDICINE

## 2019-09-25 PROCEDURE — 93010 ELECTROCARDIOGRAM REPORT: CPT | Performed by: INTERNAL MEDICINE

## 2019-09-25 PROCEDURE — 93005 ELECTROCARDIOGRAM TRACING: CPT | Performed by: PHYSICIAN ASSISTANT

## 2019-09-25 PROCEDURE — 85025 COMPLETE CBC W/AUTO DIFF WBC: CPT | Performed by: PHYSICIAN ASSISTANT

## 2019-09-25 PROCEDURE — 84484 ASSAY OF TROPONIN QUANT: CPT | Performed by: PHYSICIAN ASSISTANT

## 2019-09-25 PROCEDURE — 87040 BLOOD CULTURE FOR BACTERIA: CPT | Performed by: PHYSICIAN ASSISTANT

## 2019-09-25 PROCEDURE — 81001 URINALYSIS AUTO W/SCOPE: CPT | Performed by: PHYSICIAN ASSISTANT

## 2019-09-25 PROCEDURE — 80053 COMPREHEN METABOLIC PANEL: CPT | Performed by: PHYSICIAN ASSISTANT

## 2019-09-25 PROCEDURE — 99284 EMERGENCY DEPT VISIT MOD MDM: CPT

## 2019-09-25 PROCEDURE — 71046 X-RAY EXAM CHEST 2 VIEWS: CPT

## 2019-09-25 PROCEDURE — 71046 X-RAY EXAM CHEST 2 VIEWS: CPT | Performed by: RADIOLOGY

## 2019-09-25 PROCEDURE — 36415 COLL VENOUS BLD VENIPUNCTURE: CPT

## 2019-09-25 PROCEDURE — 99223 1ST HOSP IP/OBS HIGH 75: CPT | Performed by: PHYSICIAN ASSISTANT

## 2019-09-25 PROCEDURE — 83605 ASSAY OF LACTIC ACID: CPT | Performed by: PHYSICIAN ASSISTANT

## 2019-09-25 RX ORDER — SODIUM CHLORIDE 0.9 % (FLUSH) 0.9 %
10 SYRINGE (ML) INJECTION EVERY 12 HOURS SCHEDULED
Status: DISCONTINUED | OUTPATIENT
Start: 2019-09-26 | End: 2019-10-01 | Stop reason: HOSPADM

## 2019-09-25 RX ORDER — LEVOCETIRIZINE DIHYDROCHLORIDE 5 MG/1
5 TABLET, FILM COATED ORAL DAILY
COMMUNITY
End: 2020-01-13 | Stop reason: SINTOL

## 2019-09-25 RX ORDER — SODIUM CHLORIDE 0.9 % (FLUSH) 0.9 %
10 SYRINGE (ML) INJECTION AS NEEDED
Status: DISCONTINUED | OUTPATIENT
Start: 2019-09-25 | End: 2019-10-01 | Stop reason: HOSPADM

## 2019-09-25 RX ORDER — ALBUTEROL SULFATE 2.5 MG/3ML
2.5 SOLUTION RESPIRATORY (INHALATION) EVERY 4 HOURS PRN
Status: CANCELLED | OUTPATIENT
Start: 2019-09-25

## 2019-09-25 RX ADMIN — AZTREONAM 2 G: 2 INJECTION, POWDER, FOR SOLUTION INTRAMUSCULAR; INTRAVENOUS at 21:25

## 2019-09-25 RX ADMIN — SODIUM CHLORIDE 1000 ML: 9 INJECTION, SOLUTION INTRAVENOUS at 21:24

## 2019-09-26 LAB
ALBUMIN SERPL-MCNC: 3.9 G/DL (ref 3.5–5.2)
ALBUMIN/GLOB SERPL: 1.1 G/DL
ALP SERPL-CCNC: 74 U/L (ref 39–117)
ALT SERPL W P-5'-P-CCNC: 18 U/L (ref 1–41)
ANION GAP SERPL CALCULATED.3IONS-SCNC: 12.9 MMOL/L (ref 5–15)
AST SERPL-CCNC: 25 U/L (ref 1–40)
BACTERIA SPEC AEROBE CULT: ABNORMAL
BASOPHILS # BLD AUTO: 0.01 10*3/MM3 (ref 0–0.2)
BASOPHILS NFR BLD AUTO: 0.2 % (ref 0–1.5)
BILIRUB SERPL-MCNC: 0.3 MG/DL (ref 0.2–1.2)
BUN BLD-MCNC: 8 MG/DL (ref 6–20)
BUN/CREAT SERPL: 9.6 (ref 7–25)
CALCIUM SPEC-SCNC: 9 MG/DL (ref 8.6–10.5)
CHLORIDE SERPL-SCNC: 102 MMOL/L (ref 98–107)
CO2 SERPL-SCNC: 24.1 MMOL/L (ref 22–29)
CREAT BLD-MCNC: 0.83 MG/DL (ref 0.76–1.27)
DEPRECATED RDW RBC AUTO: 44.6 FL (ref 37–54)
EOSINOPHIL # BLD AUTO: 0.14 10*3/MM3 (ref 0–0.4)
EOSINOPHIL NFR BLD AUTO: 2.2 % (ref 0.3–6.2)
ERYTHROCYTE [DISTWIDTH] IN BLOOD BY AUTOMATED COUNT: 13.3 % (ref 12.3–15.4)
GFR SERPL CREATININE-BSD FRML MDRD: 100 ML/MIN/1.73
GLOBULIN UR ELPH-MCNC: 3.4 GM/DL
GLUCOSE BLD-MCNC: 93 MG/DL (ref 65–99)
GRAM STN SPEC: ABNORMAL
GRAM STN SPEC: ABNORMAL
HCT VFR BLD AUTO: 42.9 % (ref 37.5–51)
HGB BLD-MCNC: 14.7 G/DL (ref 13–17.7)
IMM GRANULOCYTES # BLD AUTO: 0.01 10*3/MM3 (ref 0–0.05)
IMM GRANULOCYTES NFR BLD AUTO: 0.2 % (ref 0–0.5)
ISOLATED FROM: ABNORMAL
LYMPHOCYTES # BLD AUTO: 2.26 10*3/MM3 (ref 0.7–3.1)
LYMPHOCYTES NFR BLD AUTO: 34.9 % (ref 19.6–45.3)
MCH RBC QN AUTO: 33 PG (ref 26.6–33)
MCHC RBC AUTO-ENTMCNC: 34.3 G/DL (ref 31.5–35.7)
MCV RBC AUTO: 96.2 FL (ref 79–97)
MONOCYTES # BLD AUTO: 0.95 10*3/MM3 (ref 0.1–0.9)
MONOCYTES NFR BLD AUTO: 14.7 % (ref 5–12)
NEUTROPHILS # BLD AUTO: 3.1 10*3/MM3 (ref 1.7–7)
NEUTROPHILS NFR BLD AUTO: 47.8 % (ref 42.7–76)
PHENYTOIN SERPL-MCNC: 23.7 MCG/ML (ref 10–20)
PLATELET # BLD AUTO: 199 10*3/MM3 (ref 140–450)
PMV BLD AUTO: 10.5 FL (ref 6–12)
POTASSIUM BLD-SCNC: 3.8 MMOL/L (ref 3.5–5.2)
PROT SERPL-MCNC: 7.3 G/DL (ref 6–8.5)
RBC # BLD AUTO: 4.46 10*6/MM3 (ref 4.14–5.8)
SODIUM BLD-SCNC: 139 MMOL/L (ref 136–145)
TROPONIN T SERPL-MCNC: <0.01 NG/ML (ref 0–0.03)
TROPONIN T SERPL-MCNC: <0.01 NG/ML (ref 0–0.03)
WBC NRBC COR # BLD: 6.47 10*3/MM3 (ref 3.4–10.8)

## 2019-09-26 PROCEDURE — 94640 AIRWAY INHALATION TREATMENT: CPT

## 2019-09-26 PROCEDURE — 80053 COMPREHEN METABOLIC PANEL: CPT | Performed by: INTERNAL MEDICINE

## 2019-09-26 PROCEDURE — 84484 ASSAY OF TROPONIN QUANT: CPT | Performed by: PHYSICIAN ASSISTANT

## 2019-09-26 PROCEDURE — 80185 ASSAY OF PHENYTOIN TOTAL: CPT | Performed by: PHYSICIAN ASSISTANT

## 2019-09-26 PROCEDURE — 94799 UNLISTED PULMONARY SVC/PX: CPT

## 2019-09-26 PROCEDURE — 85025 COMPLETE CBC W/AUTO DIFF WBC: CPT | Performed by: INTERNAL MEDICINE

## 2019-09-26 PROCEDURE — 80185 ASSAY OF PHENYTOIN TOTAL: CPT | Performed by: HOSPITALIST

## 2019-09-26 PROCEDURE — 25010000002 VANCOMYCIN 5 G RECONSTITUTED SOLUTION 5,000 MG VIAL: Performed by: INTERNAL MEDICINE

## 2019-09-26 PROCEDURE — 80186 ASSAY OF PHENYTOIN FREE: CPT | Performed by: HOSPITALIST

## 2019-09-26 PROCEDURE — 25010000002 HEPARIN (PORCINE) PER 1000 UNITS: Performed by: PHYSICIAN ASSISTANT

## 2019-09-26 RX ORDER — ASPIRIN 81 MG/1
81 TABLET ORAL DAILY
Status: DISCONTINUED | OUTPATIENT
Start: 2019-09-26 | End: 2019-10-01 | Stop reason: HOSPADM

## 2019-09-26 RX ORDER — BUDESONIDE AND FORMOTEROL FUMARATE DIHYDRATE 160; 4.5 UG/1; UG/1
2 AEROSOL RESPIRATORY (INHALATION) 2 TIMES DAILY
Status: DISCONTINUED | OUTPATIENT
Start: 2019-09-26 | End: 2019-10-01 | Stop reason: HOSPADM

## 2019-09-26 RX ORDER — IBUPROFEN 600 MG/1
600 TABLET ORAL 3 TIMES DAILY PRN
Status: CANCELLED | OUTPATIENT
Start: 2019-09-26

## 2019-09-26 RX ORDER — MECLIZINE HCL 12.5 MG/1
12.5 TABLET ORAL 3 TIMES DAILY PRN
Status: DISCONTINUED | OUTPATIENT
Start: 2019-09-26 | End: 2019-10-01 | Stop reason: HOSPADM

## 2019-09-26 RX ORDER — PHENYTOIN SODIUM 100 MG/1
300 CAPSULE, EXTENDED RELEASE ORAL 2 TIMES DAILY
Status: CANCELLED | OUTPATIENT
Start: 2019-09-26

## 2019-09-26 RX ORDER — ASPIRIN 81 MG/1
81 TABLET ORAL DAILY
Status: CANCELLED | OUTPATIENT
Start: 2019-09-26

## 2019-09-26 RX ORDER — ACETAMINOPHEN 325 MG/1
650 TABLET ORAL EVERY 6 HOURS PRN
Status: DISCONTINUED | OUTPATIENT
Start: 2019-09-26 | End: 2019-10-01 | Stop reason: HOSPADM

## 2019-09-26 RX ORDER — MONTELUKAST SODIUM 10 MG/1
10 TABLET ORAL NIGHTLY
Status: DISCONTINUED | OUTPATIENT
Start: 2019-09-26 | End: 2019-10-01 | Stop reason: HOSPADM

## 2019-09-26 RX ORDER — HYDROCODONE BITARTRATE AND ACETAMINOPHEN 10; 325 MG/1; MG/1
1 TABLET ORAL 2 TIMES DAILY PRN
Status: CANCELLED | OUTPATIENT
Start: 2019-09-26

## 2019-09-26 RX ORDER — CETIRIZINE HYDROCHLORIDE 10 MG/1
10 TABLET ORAL DAILY
Status: DISCONTINUED | OUTPATIENT
Start: 2019-09-26 | End: 2019-10-01 | Stop reason: HOSPADM

## 2019-09-26 RX ORDER — BUSPIRONE HYDROCHLORIDE 5 MG/1
5 TABLET ORAL 3 TIMES DAILY
Status: DISCONTINUED | OUTPATIENT
Start: 2019-09-26 | End: 2019-10-01 | Stop reason: HOSPADM

## 2019-09-26 RX ORDER — HYDROCODONE BITARTRATE AND ACETAMINOPHEN 10; 325 MG/1; MG/1
1 TABLET ORAL 2 TIMES DAILY PRN
Status: DISCONTINUED | OUTPATIENT
Start: 2019-09-26 | End: 2019-10-01 | Stop reason: HOSPADM

## 2019-09-26 RX ORDER — DOXEPIN HYDROCHLORIDE 25 MG/1
25 CAPSULE ORAL NIGHTLY
Status: DISCONTINUED | OUTPATIENT
Start: 2019-09-26 | End: 2019-10-01 | Stop reason: HOSPADM

## 2019-09-26 RX ORDER — ALBUTEROL SULFATE 2.5 MG/3ML
2.5 SOLUTION RESPIRATORY (INHALATION) EVERY 4 HOURS PRN
Status: DISCONTINUED | OUTPATIENT
Start: 2019-09-26 | End: 2019-10-01 | Stop reason: HOSPADM

## 2019-09-26 RX ORDER — HEPARIN SODIUM 5000 [USP'U]/ML
5000 INJECTION, SOLUTION INTRAVENOUS; SUBCUTANEOUS EVERY 8 HOURS SCHEDULED
Status: DISCONTINUED | OUTPATIENT
Start: 2019-09-26 | End: 2019-10-01 | Stop reason: HOSPADM

## 2019-09-26 RX ORDER — L.ACID,PARA/B.BIFIDUM/S.THERM 8B CELL
1 CAPSULE ORAL DAILY
Status: DISCONTINUED | OUTPATIENT
Start: 2019-09-26 | End: 2019-10-01 | Stop reason: HOSPADM

## 2019-09-26 RX ORDER — ATORVASTATIN CALCIUM 40 MG/1
40 TABLET, FILM COATED ORAL DAILY
Status: CANCELLED | OUTPATIENT
Start: 2019-09-26

## 2019-09-26 RX ORDER — ATORVASTATIN CALCIUM 40 MG/1
40 TABLET, FILM COATED ORAL NIGHTLY
Status: DISCONTINUED | OUTPATIENT
Start: 2019-09-26 | End: 2019-10-01 | Stop reason: HOSPADM

## 2019-09-26 RX ORDER — PANTOPRAZOLE SODIUM 40 MG/1
40 TABLET, DELAYED RELEASE ORAL DAILY
Status: DISCONTINUED | OUTPATIENT
Start: 2019-09-26 | End: 2019-10-01 | Stop reason: HOSPADM

## 2019-09-26 RX ORDER — HYDRALAZINE HYDROCHLORIDE 20 MG/ML
10 INJECTION INTRAMUSCULAR; INTRAVENOUS EVERY 6 HOURS PRN
Status: DISCONTINUED | OUTPATIENT
Start: 2019-09-26 | End: 2019-10-01 | Stop reason: HOSPADM

## 2019-09-26 RX ORDER — DIPHENHYDRAMINE HCL 25 MG
25 CAPSULE ORAL EVERY 6 HOURS PRN
Status: CANCELLED | OUTPATIENT
Start: 2019-09-26

## 2019-09-26 RX ORDER — PHENYTOIN SODIUM 100 MG/1
300 CAPSULE, EXTENDED RELEASE ORAL EVERY 12 HOURS SCHEDULED
Status: DISCONTINUED | OUTPATIENT
Start: 2019-09-26 | End: 2019-09-27

## 2019-09-26 RX ORDER — LISINOPRIL 10 MG/1
20 TABLET ORAL DAILY
Status: DISCONTINUED | OUTPATIENT
Start: 2019-09-26 | End: 2019-10-01 | Stop reason: HOSPADM

## 2019-09-26 RX ADMIN — HEPARIN SODIUM 5000 UNITS: 5000 INJECTION INTRAVENOUS; SUBCUTANEOUS at 13:28

## 2019-09-26 RX ADMIN — AZTREONAM 2 G: 2 INJECTION, POWDER, FOR SOLUTION INTRAMUSCULAR; INTRAVENOUS at 04:46

## 2019-09-26 RX ADMIN — BUSPIRONE HYDROCHLORIDE 5 MG: 5 TABLET ORAL at 16:46

## 2019-09-26 RX ADMIN — SODIUM CHLORIDE, PRESERVATIVE FREE 10 ML: 5 INJECTION INTRAVENOUS at 22:03

## 2019-09-26 RX ADMIN — LISINOPRIL 20 MG: 10 TABLET ORAL at 08:24

## 2019-09-26 RX ADMIN — HEPARIN SODIUM 5000 UNITS: 5000 INJECTION INTRAVENOUS; SUBCUTANEOUS at 22:04

## 2019-09-26 RX ADMIN — PHENYTOIN SODIUM 300 MG: 100 CAPSULE, EXTENDED RELEASE ORAL at 22:01

## 2019-09-26 RX ADMIN — VANCOMYCIN HYDROCHLORIDE 1750 MG: 5 INJECTION, POWDER, LYOPHILIZED, FOR SOLUTION INTRAVENOUS at 13:29

## 2019-09-26 RX ADMIN — BUSPIRONE HYDROCHLORIDE 5 MG: 5 TABLET ORAL at 08:23

## 2019-09-26 RX ADMIN — PANTOPRAZOLE SODIUM 40 MG: 40 TABLET, DELAYED RELEASE ORAL at 08:23

## 2019-09-26 RX ADMIN — HYDROCODONE BITARTRATE AND ACETAMINOPHEN 1 TABLET: 10; 325 TABLET ORAL at 08:29

## 2019-09-26 RX ADMIN — SODIUM CHLORIDE, PRESERVATIVE FREE 10 ML: 5 INJECTION INTRAVENOUS at 08:24

## 2019-09-26 RX ADMIN — Medication 1 CAPSULE: at 13:28

## 2019-09-26 RX ADMIN — BUDESONIDE AND FORMOTEROL FUMARATE DIHYDRATE 2 PUFF: 160; 4.5 AEROSOL RESPIRATORY (INHALATION) at 10:15

## 2019-09-26 RX ADMIN — MONTELUKAST SODIUM 10 MG: 10 TABLET, COATED ORAL at 22:02

## 2019-09-26 RX ADMIN — BUDESONIDE AND FORMOTEROL FUMARATE DIHYDRATE 2 PUFF: 160; 4.5 AEROSOL RESPIRATORY (INHALATION) at 18:40

## 2019-09-26 RX ADMIN — ASPIRIN 81 MG: 81 TABLET, COATED ORAL at 13:28

## 2019-09-26 RX ADMIN — CETIRIZINE HYDROCHLORIDE 10 MG: 10 TABLET, FILM COATED ORAL at 08:23

## 2019-09-26 RX ADMIN — BUSPIRONE HYDROCHLORIDE 5 MG: 5 TABLET ORAL at 22:03

## 2019-09-26 RX ADMIN — ALBUTEROL SULFATE 2.5 MG: 2.5 SOLUTION RESPIRATORY (INHALATION) at 10:15

## 2019-09-26 RX ADMIN — DOXEPIN HYDROCHLORIDE 25 MG: 25 CAPSULE ORAL at 22:03

## 2019-09-26 RX ADMIN — ATORVASTATIN CALCIUM 40 MG: 40 TABLET, FILM COATED ORAL at 22:02

## 2019-09-26 RX ADMIN — ALBUTEROL SULFATE 2.5 MG: 2.5 SOLUTION RESPIRATORY (INHALATION) at 18:40

## 2019-09-27 ENCOUNTER — APPOINTMENT (OUTPATIENT)
Dept: CARDIOLOGY | Facility: HOSPITAL | Age: 49
End: 2019-09-27

## 2019-09-27 LAB
BACTERIA SPEC AEROBE CULT: ABNORMAL
BASOPHILS # BLD AUTO: 0.01 10*3/MM3 (ref 0–0.2)
BASOPHILS NFR BLD AUTO: 0.1 % (ref 0–1.5)
BH CV ECHO MEAS - BSA(HAYCOCK): 2.2 M^2
BH CV ECHO MEAS - BSA: 2.1 M^2
BH CV ECHO MEAS - BZI_BMI: 34.1 KILOGRAMS/M^2
BH CV ECHO MEAS - BZI_METRIC_HEIGHT: 170.2 CM
BH CV ECHO MEAS - BZI_METRIC_WEIGHT: 98.9 KG
BH CV ECHO MEAS - EDV(MOD-SP4): 57 ML
BH CV ECHO MEAS - EF(MOD-SP4): 52.6 %
BH CV ECHO MEAS - ESV(MOD-SP4): 27 ML
BH CV ECHO MEAS - LV DIASTOLIC VOL/BSA (35-75): 27.2 ML/M^2
BH CV ECHO MEAS - LV SYSTOLIC VOL/BSA (12-30): 12.9 ML/M^2
BH CV ECHO MEAS - LVLD AP4: 6.9 CM
BH CV ECHO MEAS - LVLS AP4: 6.2 CM
BH CV ECHO MEAS - MV A MAX VEL: 48.9 CM/SEC
BH CV ECHO MEAS - MV E MAX VEL: 92.8 CM/SEC
BH CV ECHO MEAS - MV E/A: 1.9
BH CV ECHO MEAS - SI(MOD-SP4): 14.3 ML/M^2
BH CV ECHO MEAS - SV(MOD-SP4): 30 ML
CRP SERPL-MCNC: 0.25 MG/DL (ref 0–0.5)
DEPRECATED RDW RBC AUTO: 44.4 FL (ref 37–54)
EOSINOPHIL # BLD AUTO: 0.1 10*3/MM3 (ref 0–0.4)
EOSINOPHIL NFR BLD AUTO: 1.5 % (ref 0.3–6.2)
ERYTHROCYTE [DISTWIDTH] IN BLOOD BY AUTOMATED COUNT: 13.3 % (ref 12.3–15.4)
GRAM STN SPEC: ABNORMAL
HCT VFR BLD AUTO: 42.5 % (ref 37.5–51)
HGB BLD-MCNC: 14.6 G/DL (ref 13–17.7)
IMM GRANULOCYTES # BLD AUTO: 0.02 10*3/MM3 (ref 0–0.05)
IMM GRANULOCYTES NFR BLD AUTO: 0.3 % (ref 0–0.5)
LYMPHOCYTES # BLD AUTO: 2.26 10*3/MM3 (ref 0.7–3.1)
LYMPHOCYTES NFR BLD AUTO: 33.2 % (ref 19.6–45.3)
MAXIMAL PREDICTED HEART RATE: 174 BPM
MCH RBC QN AUTO: 32.9 PG (ref 26.6–33)
MCHC RBC AUTO-ENTMCNC: 34.4 G/DL (ref 31.5–35.7)
MCV RBC AUTO: 95.7 FL (ref 79–97)
MONOCYTES # BLD AUTO: 0.83 10*3/MM3 (ref 0.1–0.9)
MONOCYTES NFR BLD AUTO: 12.2 % (ref 5–12)
NEUTROPHILS # BLD AUTO: 3.59 10*3/MM3 (ref 1.7–7)
NEUTROPHILS NFR BLD AUTO: 52.7 % (ref 42.7–76)
PLATELET # BLD AUTO: 195 10*3/MM3 (ref 140–450)
PMV BLD AUTO: 10.4 FL (ref 6–12)
RBC # BLD AUTO: 4.44 10*6/MM3 (ref 4.14–5.8)
STRESS TARGET HR: 148 BPM
WBC NRBC COR # BLD: 6.81 10*3/MM3 (ref 3.4–10.8)

## 2019-09-27 PROCEDURE — 94799 UNLISTED PULMONARY SVC/PX: CPT

## 2019-09-27 PROCEDURE — 99232 SBSQ HOSP IP/OBS MODERATE 35: CPT | Performed by: HOSPITALIST

## 2019-09-27 PROCEDURE — 85025 COMPLETE CBC W/AUTO DIFF WBC: CPT | Performed by: HOSPITALIST

## 2019-09-27 PROCEDURE — 25010000002 VANCOMYCIN 5 G RECONSTITUTED SOLUTION 5,000 MG VIAL: Performed by: INTERNAL MEDICINE

## 2019-09-27 PROCEDURE — 93306 TTE W/DOPPLER COMPLETE: CPT

## 2019-09-27 PROCEDURE — 25010000002 HEPARIN (PORCINE) PER 1000 UNITS: Performed by: PHYSICIAN ASSISTANT

## 2019-09-27 PROCEDURE — 86140 C-REACTIVE PROTEIN: CPT | Performed by: NURSE PRACTITIONER

## 2019-09-27 PROCEDURE — 93306 TTE W/DOPPLER COMPLETE: CPT | Performed by: INTERNAL MEDICINE

## 2019-09-27 RX ORDER — PHENYTOIN SODIUM 100 MG/1
300 CAPSULE, EXTENDED RELEASE ORAL
Status: DISCONTINUED | OUTPATIENT
Start: 2019-09-28 | End: 2019-09-29

## 2019-09-27 RX ORDER — LACTULOSE 10 G/15ML
30 SOLUTION ORAL ONCE
Status: COMPLETED | OUTPATIENT
Start: 2019-09-27 | End: 2019-09-27

## 2019-09-27 RX ORDER — SENNA PLUS 8.6 MG/1
2 TABLET ORAL DAILY
Status: DISCONTINUED | OUTPATIENT
Start: 2019-09-27 | End: 2019-10-01 | Stop reason: HOSPADM

## 2019-09-27 RX ORDER — PHENYTOIN SODIUM 100 MG/1
200 CAPSULE, EXTENDED RELEASE ORAL EVERY EVENING
Status: DISCONTINUED | OUTPATIENT
Start: 2019-09-27 | End: 2019-09-30

## 2019-09-27 RX ADMIN — ALBUTEROL SULFATE 2.5 MG: 2.5 SOLUTION RESPIRATORY (INHALATION) at 19:25

## 2019-09-27 RX ADMIN — BUDESONIDE AND FORMOTEROL FUMARATE DIHYDRATE 2 PUFF: 160; 4.5 AEROSOL RESPIRATORY (INHALATION) at 07:20

## 2019-09-27 RX ADMIN — HEPARIN SODIUM 5000 UNITS: 5000 INJECTION INTRAVENOUS; SUBCUTANEOUS at 05:58

## 2019-09-27 RX ADMIN — SODIUM CHLORIDE, PRESERVATIVE FREE 10 ML: 5 INJECTION INTRAVENOUS at 20:08

## 2019-09-27 RX ADMIN — BUSPIRONE HYDROCHLORIDE 5 MG: 5 TABLET ORAL at 15:16

## 2019-09-27 RX ADMIN — ALBUTEROL SULFATE 2.5 MG: 2.5 SOLUTION RESPIRATORY (INHALATION) at 07:20

## 2019-09-27 RX ADMIN — ASPIRIN 81 MG: 81 TABLET, COATED ORAL at 09:16

## 2019-09-27 RX ADMIN — PANTOPRAZOLE SODIUM 40 MG: 40 TABLET, DELAYED RELEASE ORAL at 09:16

## 2019-09-27 RX ADMIN — HEPARIN SODIUM 5000 UNITS: 5000 INJECTION INTRAVENOUS; SUBCUTANEOUS at 13:45

## 2019-09-27 RX ADMIN — Medication 1 CAPSULE: at 09:16

## 2019-09-27 RX ADMIN — MONTELUKAST SODIUM 10 MG: 10 TABLET, COATED ORAL at 20:07

## 2019-09-27 RX ADMIN — PHENYTOIN SODIUM 200 MG: 100 CAPSULE, EXTENDED RELEASE ORAL at 17:18

## 2019-09-27 RX ADMIN — LISINOPRIL 20 MG: 10 TABLET ORAL at 09:16

## 2019-09-27 RX ADMIN — BUSPIRONE HYDROCHLORIDE 5 MG: 5 TABLET ORAL at 09:16

## 2019-09-27 RX ADMIN — ATORVASTATIN CALCIUM 40 MG: 40 TABLET, FILM COATED ORAL at 20:07

## 2019-09-27 RX ADMIN — BUDESONIDE AND FORMOTEROL FUMARATE DIHYDRATE 2 PUFF: 160; 4.5 AEROSOL RESPIRATORY (INHALATION) at 19:25

## 2019-09-27 RX ADMIN — VANCOMYCIN HYDROCHLORIDE 1500 MG: 5 INJECTION, POWDER, LYOPHILIZED, FOR SOLUTION INTRAVENOUS at 13:45

## 2019-09-27 RX ADMIN — SENNOSIDES 2 TABLET: 8.6 TABLET, FILM COATED ORAL at 15:16

## 2019-09-27 RX ADMIN — BUSPIRONE HYDROCHLORIDE 5 MG: 5 TABLET ORAL at 20:07

## 2019-09-27 RX ADMIN — DOXEPIN HYDROCHLORIDE 25 MG: 25 CAPSULE ORAL at 20:07

## 2019-09-27 RX ADMIN — PHENYTOIN SODIUM 300 MG: 100 CAPSULE, EXTENDED RELEASE ORAL at 09:16

## 2019-09-27 RX ADMIN — VANCOMYCIN HYDROCHLORIDE 1500 MG: 5 INJECTION, POWDER, LYOPHILIZED, FOR SOLUTION INTRAVENOUS at 01:39

## 2019-09-27 RX ADMIN — SODIUM CHLORIDE, PRESERVATIVE FREE 10 ML: 5 INJECTION INTRAVENOUS at 09:16

## 2019-09-27 RX ADMIN — HEPARIN SODIUM 5000 UNITS: 5000 INJECTION INTRAVENOUS; SUBCUTANEOUS at 20:07

## 2019-09-27 RX ADMIN — HYDROCODONE BITARTRATE AND ACETAMINOPHEN 1 TABLET: 10; 325 TABLET ORAL at 03:08

## 2019-09-27 RX ADMIN — HYDROCODONE BITARTRATE AND ACETAMINOPHEN 1 TABLET: 10; 325 TABLET ORAL at 20:07

## 2019-09-27 RX ADMIN — LACTULOSE 30 G: 10 SOLUTION ORAL at 15:15

## 2019-09-27 RX ADMIN — CETIRIZINE HYDROCHLORIDE 10 MG: 10 TABLET, FILM COATED ORAL at 09:16

## 2019-09-28 ENCOUNTER — APPOINTMENT (OUTPATIENT)
Dept: ULTRASOUND IMAGING | Facility: HOSPITAL | Age: 49
End: 2019-09-28

## 2019-09-28 LAB
ANION GAP SERPL CALCULATED.3IONS-SCNC: 10.3 MMOL/L (ref 5–15)
BASOPHILS # BLD AUTO: 0.01 10*3/MM3 (ref 0–0.2)
BASOPHILS NFR BLD AUTO: 0.1 % (ref 0–1.5)
BUN BLD-MCNC: 9 MG/DL (ref 6–20)
BUN/CREAT SERPL: 10.3 (ref 7–25)
CALCIUM SPEC-SCNC: 9.1 MG/DL (ref 8.6–10.5)
CHLORIDE SERPL-SCNC: 103 MMOL/L (ref 98–107)
CO2 SERPL-SCNC: 25.7 MMOL/L (ref 22–29)
CREAT BLD-MCNC: 0.87 MG/DL (ref 0.76–1.27)
CRP SERPL-MCNC: 0.22 MG/DL (ref 0–0.5)
DEPRECATED RDW RBC AUTO: 45.2 FL (ref 37–54)
EOSINOPHIL # BLD AUTO: 0.16 10*3/MM3 (ref 0–0.4)
EOSINOPHIL NFR BLD AUTO: 2.4 % (ref 0.3–6.2)
ERYTHROCYTE [DISTWIDTH] IN BLOOD BY AUTOMATED COUNT: 13.2 % (ref 12.3–15.4)
GFR SERPL CREATININE-BSD FRML MDRD: 94 ML/MIN/1.73
GLUCOSE BLD-MCNC: 78 MG/DL (ref 65–99)
HCT VFR BLD AUTO: 43.7 % (ref 37.5–51)
HGB BLD-MCNC: 14.9 G/DL (ref 13–17.7)
IMM GRANULOCYTES # BLD AUTO: 0 10*3/MM3 (ref 0–0.05)
IMM GRANULOCYTES NFR BLD AUTO: 0 % (ref 0–0.5)
LYMPHOCYTES # BLD AUTO: 2.22 10*3/MM3 (ref 0.7–3.1)
LYMPHOCYTES NFR BLD AUTO: 32.8 % (ref 19.6–45.3)
MCH RBC QN AUTO: 32.8 PG (ref 26.6–33)
MCHC RBC AUTO-ENTMCNC: 34.1 G/DL (ref 31.5–35.7)
MCV RBC AUTO: 96.3 FL (ref 79–97)
MONOCYTES # BLD AUTO: 0.8 10*3/MM3 (ref 0.1–0.9)
MONOCYTES NFR BLD AUTO: 11.8 % (ref 5–12)
NEUTROPHILS # BLD AUTO: 3.58 10*3/MM3 (ref 1.7–7)
NEUTROPHILS NFR BLD AUTO: 52.9 % (ref 42.7–76)
PLATELET # BLD AUTO: 185 10*3/MM3 (ref 140–450)
PMV BLD AUTO: 9.8 FL (ref 6–12)
POTASSIUM BLD-SCNC: 4.1 MMOL/L (ref 3.5–5.2)
RBC # BLD AUTO: 4.54 10*6/MM3 (ref 4.14–5.8)
SODIUM BLD-SCNC: 139 MMOL/L (ref 136–145)
VANCOMYCIN TROUGH SERPL-MCNC: 12.3 MCG/ML (ref 5–20)
WBC NRBC COR # BLD: 6.77 10*3/MM3 (ref 3.4–10.8)

## 2019-09-28 PROCEDURE — 86140 C-REACTIVE PROTEIN: CPT | Performed by: NURSE PRACTITIONER

## 2019-09-28 PROCEDURE — 80048 BASIC METABOLIC PNL TOTAL CA: CPT | Performed by: HOSPITALIST

## 2019-09-28 PROCEDURE — 93880 EXTRACRANIAL BILAT STUDY: CPT

## 2019-09-28 PROCEDURE — 85025 COMPLETE CBC W/AUTO DIFF WBC: CPT | Performed by: HOSPITALIST

## 2019-09-28 PROCEDURE — 99254 IP/OBS CNSLTJ NEW/EST MOD 60: CPT | Performed by: PHYSICIAN ASSISTANT

## 2019-09-28 PROCEDURE — 93880 EXTRACRANIAL BILAT STUDY: CPT | Performed by: RADIOLOGY

## 2019-09-28 PROCEDURE — 94799 UNLISTED PULMONARY SVC/PX: CPT

## 2019-09-28 PROCEDURE — 80202 ASSAY OF VANCOMYCIN: CPT | Performed by: HOSPITALIST

## 2019-09-28 PROCEDURE — 25010000002 VANCOMYCIN 5 G RECONSTITUTED SOLUTION 5,000 MG VIAL: Performed by: INTERNAL MEDICINE

## 2019-09-28 PROCEDURE — 25010000002 HEPARIN (PORCINE) PER 1000 UNITS: Performed by: PHYSICIAN ASSISTANT

## 2019-09-28 PROCEDURE — 99232 SBSQ HOSP IP/OBS MODERATE 35: CPT | Performed by: HOSPITALIST

## 2019-09-28 RX ADMIN — ASPIRIN 81 MG: 81 TABLET, COATED ORAL at 09:13

## 2019-09-28 RX ADMIN — DOXEPIN HYDROCHLORIDE 25 MG: 25 CAPSULE ORAL at 20:23

## 2019-09-28 RX ADMIN — PHENYTOIN SODIUM 200 MG: 100 CAPSULE, EXTENDED RELEASE ORAL at 15:57

## 2019-09-28 RX ADMIN — HEPARIN SODIUM 5000 UNITS: 5000 INJECTION INTRAVENOUS; SUBCUTANEOUS at 05:41

## 2019-09-28 RX ADMIN — ATORVASTATIN CALCIUM 40 MG: 40 TABLET, FILM COATED ORAL at 20:23

## 2019-09-28 RX ADMIN — PANTOPRAZOLE SODIUM 40 MG: 40 TABLET, DELAYED RELEASE ORAL at 09:12

## 2019-09-28 RX ADMIN — BUDESONIDE AND FORMOTEROL FUMARATE DIHYDRATE 2 PUFF: 160; 4.5 AEROSOL RESPIRATORY (INHALATION) at 06:56

## 2019-09-28 RX ADMIN — BUDESONIDE AND FORMOTEROL FUMARATE DIHYDRATE 2 PUFF: 160; 4.5 AEROSOL RESPIRATORY (INHALATION) at 18:59

## 2019-09-28 RX ADMIN — VANCOMYCIN HYDROCHLORIDE 1500 MG: 5 INJECTION, POWDER, LYOPHILIZED, FOR SOLUTION INTRAVENOUS at 01:35

## 2019-09-28 RX ADMIN — HEPARIN SODIUM 5000 UNITS: 5000 INJECTION INTRAVENOUS; SUBCUTANEOUS at 14:03

## 2019-09-28 RX ADMIN — LISINOPRIL 20 MG: 10 TABLET ORAL at 09:12

## 2019-09-28 RX ADMIN — SODIUM CHLORIDE, PRESERVATIVE FREE 10 ML: 5 INJECTION INTRAVENOUS at 09:13

## 2019-09-28 RX ADMIN — PHENYTOIN SODIUM 300 MG: 100 CAPSULE, EXTENDED RELEASE ORAL at 05:41

## 2019-09-28 RX ADMIN — BUSPIRONE HYDROCHLORIDE 5 MG: 5 TABLET ORAL at 15:56

## 2019-09-28 RX ADMIN — BUSPIRONE HYDROCHLORIDE 5 MG: 5 TABLET ORAL at 20:23

## 2019-09-28 RX ADMIN — SODIUM CHLORIDE, PRESERVATIVE FREE 10 ML: 5 INJECTION INTRAVENOUS at 20:26

## 2019-09-28 RX ADMIN — MONTELUKAST SODIUM 10 MG: 10 TABLET, COATED ORAL at 20:23

## 2019-09-28 RX ADMIN — CETIRIZINE HYDROCHLORIDE 10 MG: 10 TABLET, FILM COATED ORAL at 09:12

## 2019-09-28 RX ADMIN — HEPARIN SODIUM 5000 UNITS: 5000 INJECTION INTRAVENOUS; SUBCUTANEOUS at 20:23

## 2019-09-28 RX ADMIN — SENNOSIDES 2 TABLET: 8.6 TABLET, FILM COATED ORAL at 09:13

## 2019-09-28 RX ADMIN — BUSPIRONE HYDROCHLORIDE 5 MG: 5 TABLET ORAL at 09:12

## 2019-09-28 RX ADMIN — Medication 1 CAPSULE: at 09:13

## 2019-09-29 LAB
CRP SERPL-MCNC: 0.22 MG/DL (ref 0–0.5)
PHENYTOIN SERPL-MCNC: 23.7 MCG/ML (ref 10–20)

## 2019-09-29 PROCEDURE — 25010000002 HEPARIN (PORCINE) PER 1000 UNITS: Performed by: PHYSICIAN ASSISTANT

## 2019-09-29 PROCEDURE — 86140 C-REACTIVE PROTEIN: CPT | Performed by: NURSE PRACTITIONER

## 2019-09-29 PROCEDURE — 94799 UNLISTED PULMONARY SVC/PX: CPT

## 2019-09-29 PROCEDURE — 80185 ASSAY OF PHENYTOIN TOTAL: CPT | Performed by: HOSPITALIST

## 2019-09-29 PROCEDURE — 25010000002 VANCOMYCIN 5 G RECONSTITUTED SOLUTION 5,000 MG VIAL: Performed by: INTERNAL MEDICINE

## 2019-09-29 PROCEDURE — 99232 SBSQ HOSP IP/OBS MODERATE 35: CPT | Performed by: HOSPITALIST

## 2019-09-29 PROCEDURE — 25010000002 VANCOMYCIN 5 G RECONSTITUTED SOLUTION 5,000 MG VIAL: Performed by: NURSE PRACTITIONER

## 2019-09-29 RX ORDER — PHENYTOIN SODIUM 100 MG/1
200 CAPSULE, EXTENDED RELEASE ORAL
Status: DISCONTINUED | OUTPATIENT
Start: 2019-09-30 | End: 2019-09-30

## 2019-09-29 RX ADMIN — BUSPIRONE HYDROCHLORIDE 5 MG: 5 TABLET ORAL at 21:44

## 2019-09-29 RX ADMIN — HEPARIN SODIUM 5000 UNITS: 5000 INJECTION INTRAVENOUS; SUBCUTANEOUS at 21:44

## 2019-09-29 RX ADMIN — Medication 1 CAPSULE: at 09:15

## 2019-09-29 RX ADMIN — VANCOMYCIN HYDROCHLORIDE 1500 MG: 5 INJECTION, POWDER, LYOPHILIZED, FOR SOLUTION INTRAVENOUS at 13:15

## 2019-09-29 RX ADMIN — VANCOMYCIN HYDROCHLORIDE 1500 MG: 5 INJECTION, POWDER, LYOPHILIZED, FOR SOLUTION INTRAVENOUS at 01:02

## 2019-09-29 RX ADMIN — BUSPIRONE HYDROCHLORIDE 5 MG: 5 TABLET ORAL at 15:30

## 2019-09-29 RX ADMIN — LISINOPRIL 20 MG: 10 TABLET ORAL at 09:15

## 2019-09-29 RX ADMIN — BUSPIRONE HYDROCHLORIDE 5 MG: 5 TABLET ORAL at 09:15

## 2019-09-29 RX ADMIN — BUDESONIDE AND FORMOTEROL FUMARATE DIHYDRATE 2 PUFF: 160; 4.5 AEROSOL RESPIRATORY (INHALATION) at 18:53

## 2019-09-29 RX ADMIN — ASPIRIN 81 MG: 81 TABLET, COATED ORAL at 09:15

## 2019-09-29 RX ADMIN — CETIRIZINE HYDROCHLORIDE 10 MG: 10 TABLET, FILM COATED ORAL at 09:15

## 2019-09-29 RX ADMIN — SENNOSIDES 2 TABLET: 8.6 TABLET, FILM COATED ORAL at 09:15

## 2019-09-29 RX ADMIN — PANTOPRAZOLE SODIUM 40 MG: 40 TABLET, DELAYED RELEASE ORAL at 09:15

## 2019-09-29 RX ADMIN — ATORVASTATIN CALCIUM 40 MG: 40 TABLET, FILM COATED ORAL at 21:44

## 2019-09-29 RX ADMIN — MONTELUKAST SODIUM 10 MG: 10 TABLET, COATED ORAL at 21:44

## 2019-09-29 RX ADMIN — DOXEPIN HYDROCHLORIDE 25 MG: 25 CAPSULE ORAL at 21:44

## 2019-09-29 RX ADMIN — PHENYTOIN SODIUM 300 MG: 100 CAPSULE, EXTENDED RELEASE ORAL at 05:35

## 2019-09-29 RX ADMIN — BUDESONIDE AND FORMOTEROL FUMARATE DIHYDRATE 2 PUFF: 160; 4.5 AEROSOL RESPIRATORY (INHALATION) at 07:00

## 2019-09-29 RX ADMIN — SODIUM CHLORIDE, PRESERVATIVE FREE 10 ML: 5 INJECTION INTRAVENOUS at 09:16

## 2019-09-29 RX ADMIN — HEPARIN SODIUM 5000 UNITS: 5000 INJECTION INTRAVENOUS; SUBCUTANEOUS at 05:36

## 2019-09-29 RX ADMIN — SODIUM CHLORIDE, PRESERVATIVE FREE 10 ML: 5 INJECTION INTRAVENOUS at 21:44

## 2019-09-29 RX ADMIN — PHENYTOIN SODIUM 200 MG: 100 CAPSULE, EXTENDED RELEASE ORAL at 16:48

## 2019-09-29 RX ADMIN — HEPARIN SODIUM 5000 UNITS: 5000 INJECTION INTRAVENOUS; SUBCUTANEOUS at 13:15

## 2019-09-30 ENCOUNTER — ANESTHESIA (OUTPATIENT)
Dept: CARDIOLOGY | Facility: HOSPITAL | Age: 49
End: 2019-09-30

## 2019-09-30 ENCOUNTER — APPOINTMENT (OUTPATIENT)
Dept: CT IMAGING | Facility: HOSPITAL | Age: 49
End: 2019-09-30

## 2019-09-30 ENCOUNTER — ANESTHESIA EVENT (OUTPATIENT)
Dept: CARDIOLOGY | Facility: HOSPITAL | Age: 49
End: 2019-09-30

## 2019-09-30 ENCOUNTER — APPOINTMENT (OUTPATIENT)
Dept: CARDIOLOGY | Facility: HOSPITAL | Age: 49
End: 2019-09-30

## 2019-09-30 LAB
BACTERIA SPEC AEROBE CULT: NORMAL
BACTERIA SPEC AEROBE CULT: NORMAL
CRP SERPL-MCNC: 0.22 MG/DL (ref 0–0.5)
PHENYTOIN FREE SERPL-MCNC: 1.9 UG/ML (ref 1–2)
PHENYTOIN SERPL-MCNC: 21.7 MCG/ML (ref 10–20)
PHENYTOIN SERPL-MCNC: 24 MCG/ML (ref 10–20)
PHENYTOIN SERPL-MCNC: 24.9 UG/ML (ref 10–20)

## 2019-09-30 PROCEDURE — 93321 DOPPLER ECHO F-UP/LMTD STD: CPT

## 2019-09-30 PROCEDURE — 93325 DOPPLER ECHO COLOR FLOW MAPG: CPT

## 2019-09-30 PROCEDURE — 70450 CT HEAD/BRAIN W/O DYE: CPT

## 2019-09-30 PROCEDURE — 97116 GAIT TRAINING THERAPY: CPT

## 2019-09-30 PROCEDURE — 99232 SBSQ HOSP IP/OBS MODERATE 35: CPT | Performed by: INTERNAL MEDICINE

## 2019-09-30 PROCEDURE — 25010000002 PROPOFOL 10 MG/ML EMULSION: Performed by: NURSE ANESTHETIST, CERTIFIED REGISTERED

## 2019-09-30 PROCEDURE — 80185 ASSAY OF PHENYTOIN TOTAL: CPT | Performed by: PHYSICIAN ASSISTANT

## 2019-09-30 PROCEDURE — 93321 DOPPLER ECHO F-UP/LMTD STD: CPT | Performed by: INTERNAL MEDICINE

## 2019-09-30 PROCEDURE — 86140 C-REACTIVE PROTEIN: CPT | Performed by: NURSE PRACTITIONER

## 2019-09-30 PROCEDURE — 93312 ECHO TRANSESOPHAGEAL: CPT

## 2019-09-30 PROCEDURE — 25010000002 HEPARIN (PORCINE) PER 1000 UNITS: Performed by: PHYSICIAN ASSISTANT

## 2019-09-30 PROCEDURE — 93325 DOPPLER ECHO COLOR FLOW MAPG: CPT | Performed by: INTERNAL MEDICINE

## 2019-09-30 PROCEDURE — 97162 PT EVAL MOD COMPLEX 30 MIN: CPT

## 2019-09-30 PROCEDURE — 25010000002 FENTANYL CITRATE (PF) 100 MCG/2ML SOLUTION: Performed by: NURSE ANESTHETIST, CERTIFIED REGISTERED

## 2019-09-30 PROCEDURE — 97530 THERAPEUTIC ACTIVITIES: CPT

## 2019-09-30 PROCEDURE — 93312 ECHO TRANSESOPHAGEAL: CPT | Performed by: INTERNAL MEDICINE

## 2019-09-30 PROCEDURE — 25010000002 VANCOMYCIN 5 G RECONSTITUTED SOLUTION 5,000 MG VIAL: Performed by: NURSE PRACTITIONER

## 2019-09-30 PROCEDURE — 94799 UNLISTED PULMONARY SVC/PX: CPT

## 2019-09-30 PROCEDURE — 25010000002 MIDAZOLAM PER 1 MG: Performed by: NURSE ANESTHETIST, CERTIFIED REGISTERED

## 2019-09-30 PROCEDURE — 70450 CT HEAD/BRAIN W/O DYE: CPT | Performed by: RADIOLOGY

## 2019-09-30 RX ORDER — FENTANYL CITRATE 50 UG/ML
INJECTION, SOLUTION INTRAMUSCULAR; INTRAVENOUS AS NEEDED
Status: DISCONTINUED | OUTPATIENT
Start: 2019-09-30 | End: 2019-09-30 | Stop reason: SURG

## 2019-09-30 RX ORDER — SODIUM CHLORIDE 9 MG/ML
INJECTION, SOLUTION INTRAVENOUS CONTINUOUS PRN
Status: DISCONTINUED | OUTPATIENT
Start: 2019-09-30 | End: 2019-09-30 | Stop reason: SURG

## 2019-09-30 RX ORDER — LIDOCAINE HYDROCHLORIDE 20 MG/ML
INJECTION, SOLUTION INFILTRATION; PERINEURAL AS NEEDED
Status: DISCONTINUED | OUTPATIENT
Start: 2019-09-30 | End: 2019-09-30 | Stop reason: SURG

## 2019-09-30 RX ORDER — PROPOFOL 10 MG/ML
VIAL (ML) INTRAVENOUS AS NEEDED
Status: DISCONTINUED | OUTPATIENT
Start: 2019-09-30 | End: 2019-09-30 | Stop reason: SURG

## 2019-09-30 RX ORDER — MIDAZOLAM HYDROCHLORIDE 1 MG/ML
INJECTION INTRAMUSCULAR; INTRAVENOUS AS NEEDED
Status: DISCONTINUED | OUTPATIENT
Start: 2019-09-30 | End: 2019-09-30 | Stop reason: SURG

## 2019-09-30 RX ADMIN — LISINOPRIL 20 MG: 10 TABLET ORAL at 09:05

## 2019-09-30 RX ADMIN — CETIRIZINE HYDROCHLORIDE 10 MG: 10 TABLET, FILM COATED ORAL at 09:05

## 2019-09-30 RX ADMIN — Medication 1 CAPSULE: at 09:05

## 2019-09-30 RX ADMIN — SENNOSIDES 2 TABLET: 8.6 TABLET, FILM COATED ORAL at 09:05

## 2019-09-30 RX ADMIN — HYDROCODONE BITARTRATE AND ACETAMINOPHEN 1 TABLET: 10; 325 TABLET ORAL at 09:14

## 2019-09-30 RX ADMIN — LIDOCAINE HYDROCHLORIDE 100 MG: 20 INJECTION, SOLUTION INFILTRATION; PERINEURAL at 11:14

## 2019-09-30 RX ADMIN — ATORVASTATIN CALCIUM 40 MG: 40 TABLET, FILM COATED ORAL at 23:28

## 2019-09-30 RX ADMIN — PROPOFOL 100 MG: 10 INJECTION, EMULSION INTRAVENOUS at 11:15

## 2019-09-30 RX ADMIN — HEPARIN SODIUM 5000 UNITS: 5000 INJECTION INTRAVENOUS; SUBCUTANEOUS at 13:56

## 2019-09-30 RX ADMIN — HEPARIN SODIUM 5000 UNITS: 5000 INJECTION INTRAVENOUS; SUBCUTANEOUS at 23:28

## 2019-09-30 RX ADMIN — BUDESONIDE AND FORMOTEROL FUMARATE DIHYDRATE 2 PUFF: 160; 4.5 AEROSOL RESPIRATORY (INHALATION) at 19:55

## 2019-09-30 RX ADMIN — ACETAMINOPHEN 650 MG: 325 TABLET ORAL at 17:46

## 2019-09-30 RX ADMIN — VANCOMYCIN HYDROCHLORIDE 1500 MG: 5 INJECTION, POWDER, LYOPHILIZED, FOR SOLUTION INTRAVENOUS at 13:55

## 2019-09-30 RX ADMIN — ALBUTEROL SULFATE 2.5 MG: 2.5 SOLUTION RESPIRATORY (INHALATION) at 07:28

## 2019-09-30 RX ADMIN — PANTOPRAZOLE SODIUM 40 MG: 40 TABLET, DELAYED RELEASE ORAL at 09:06

## 2019-09-30 RX ADMIN — MIDAZOLAM HYDROCHLORIDE 2 MG: 1 INJECTION, SOLUTION INTRAMUSCULAR; INTRAVENOUS at 11:14

## 2019-09-30 RX ADMIN — PROPOFOL 50 MG: 10 INJECTION, EMULSION INTRAVENOUS at 11:20

## 2019-09-30 RX ADMIN — FENTANYL CITRATE 100 MCG: 50 INJECTION INTRAMUSCULAR; INTRAVENOUS at 11:14

## 2019-09-30 RX ADMIN — MONTELUKAST SODIUM 10 MG: 10 TABLET, COATED ORAL at 23:28

## 2019-09-30 RX ADMIN — SODIUM CHLORIDE: 0.9 INJECTION, SOLUTION INTRAVENOUS at 11:09

## 2019-09-30 RX ADMIN — SODIUM CHLORIDE, PRESERVATIVE FREE 10 ML: 5 INJECTION INTRAVENOUS at 23:29

## 2019-09-30 RX ADMIN — BUSPIRONE HYDROCHLORIDE 5 MG: 5 TABLET ORAL at 09:05

## 2019-09-30 RX ADMIN — DOXEPIN HYDROCHLORIDE 25 MG: 25 CAPSULE ORAL at 23:28

## 2019-09-30 RX ADMIN — VANCOMYCIN HYDROCHLORIDE 1500 MG: 5 INJECTION, POWDER, LYOPHILIZED, FOR SOLUTION INTRAVENOUS at 01:02

## 2019-09-30 RX ADMIN — BUSPIRONE HYDROCHLORIDE 5 MG: 5 TABLET ORAL at 23:28

## 2019-09-30 RX ADMIN — ASPIRIN 81 MG: 81 TABLET, COATED ORAL at 12:46

## 2019-09-30 RX ADMIN — BUSPIRONE HYDROCHLORIDE 5 MG: 5 TABLET ORAL at 16:21

## 2019-09-30 RX ADMIN — BUDESONIDE AND FORMOTEROL FUMARATE DIHYDRATE 2 PUFF: 160; 4.5 AEROSOL RESPIRATORY (INHALATION) at 07:27

## 2019-09-30 RX ADMIN — HYDROCODONE BITARTRATE AND ACETAMINOPHEN 1 TABLET: 10; 325 TABLET ORAL at 23:28

## 2019-10-01 VITALS
TEMPERATURE: 98.4 F | BODY MASS INDEX: 34.29 KG/M2 | WEIGHT: 218.5 LBS | HEART RATE: 72 BPM | OXYGEN SATURATION: 97 % | SYSTOLIC BLOOD PRESSURE: 100 MMHG | DIASTOLIC BLOOD PRESSURE: 63 MMHG | RESPIRATION RATE: 18 BRPM | HEIGHT: 67 IN

## 2019-10-01 DIAGNOSIS — G40.909 SEIZURE DISORDER (HCC): Primary | ICD-10-CM

## 2019-10-01 LAB
ALBUMIN SERPL-MCNC: 3.47 G/DL (ref 3.5–5.2)
ALBUMIN/GLOB SERPL: 1.1 G/DL
ALP SERPL-CCNC: 65 U/L (ref 39–117)
ALT SERPL W P-5'-P-CCNC: 27 U/L (ref 1–41)
ANION GAP SERPL CALCULATED.3IONS-SCNC: 10.7 MMOL/L (ref 5–15)
AST SERPL-CCNC: 25 U/L (ref 1–40)
BASOPHILS # BLD AUTO: 0.02 10*3/MM3 (ref 0–0.2)
BASOPHILS NFR BLD AUTO: 0.3 % (ref 0–1.5)
BH CV ECHO MEAS - BSA(HAYCOCK): 2.2 M^2
BH CV ECHO MEAS - BSA: 2.1 M^2
BH CV ECHO MEAS - BZI_BMI: 34.1 KILOGRAMS/M^2
BH CV ECHO MEAS - BZI_METRIC_HEIGHT: 170.2 CM
BH CV ECHO MEAS - BZI_METRIC_WEIGHT: 98.9 KG
BILIRUB SERPL-MCNC: 0.3 MG/DL (ref 0.2–1.2)
BUN BLD-MCNC: 10 MG/DL (ref 6–20)
BUN/CREAT SERPL: 11.5 (ref 7–25)
CALCIUM SPEC-SCNC: 8.4 MG/DL (ref 8.6–10.5)
CHLORIDE SERPL-SCNC: 103 MMOL/L (ref 98–107)
CO2 SERPL-SCNC: 24.3 MMOL/L (ref 22–29)
CREAT BLD-MCNC: 0.87 MG/DL (ref 0.76–1.27)
CRP SERPL-MCNC: 0.2 MG/DL (ref 0–0.5)
DEPRECATED RDW RBC AUTO: 44.7 FL (ref 37–54)
EOSINOPHIL # BLD AUTO: 0.15 10*3/MM3 (ref 0–0.4)
EOSINOPHIL NFR BLD AUTO: 2.4 % (ref 0.3–6.2)
ERYTHROCYTE [DISTWIDTH] IN BLOOD BY AUTOMATED COUNT: 13.2 % (ref 12.3–15.4)
GFR SERPL CREATININE-BSD FRML MDRD: 94 ML/MIN/1.73
GLOBULIN UR ELPH-MCNC: 3.2 GM/DL
GLUCOSE BLD-MCNC: 86 MG/DL (ref 65–99)
HCT VFR BLD AUTO: 41.1 % (ref 37.5–51)
HGB BLD-MCNC: 14.1 G/DL (ref 13–17.7)
IMM GRANULOCYTES # BLD AUTO: 0.01 10*3/MM3 (ref 0–0.05)
IMM GRANULOCYTES NFR BLD AUTO: 0.2 % (ref 0–0.5)
LYMPHOCYTES # BLD AUTO: 2.3 10*3/MM3 (ref 0.7–3.1)
LYMPHOCYTES NFR BLD AUTO: 36.1 % (ref 19.6–45.3)
MCH RBC QN AUTO: 33.1 PG (ref 26.6–33)
MCHC RBC AUTO-ENTMCNC: 34.3 G/DL (ref 31.5–35.7)
MCV RBC AUTO: 96.5 FL (ref 79–97)
MONOCYTES # BLD AUTO: 0.71 10*3/MM3 (ref 0.1–0.9)
MONOCYTES NFR BLD AUTO: 11.1 % (ref 5–12)
NEUTROPHILS # BLD AUTO: 3.18 10*3/MM3 (ref 1.7–7)
NEUTROPHILS NFR BLD AUTO: 49.9 % (ref 42.7–76)
PHENYTOIN SERPL-MCNC: 19.4 MCG/ML (ref 10–20)
PLATELET # BLD AUTO: 175 10*3/MM3 (ref 140–450)
PMV BLD AUTO: 10.8 FL (ref 6–12)
POTASSIUM BLD-SCNC: 4.2 MMOL/L (ref 3.5–5.2)
PROT SERPL-MCNC: 6.7 G/DL (ref 6–8.5)
RBC # BLD AUTO: 4.26 10*6/MM3 (ref 4.14–5.8)
SODIUM BLD-SCNC: 138 MMOL/L (ref 136–145)
WBC NRBC COR # BLD: 6.37 10*3/MM3 (ref 3.4–10.8)

## 2019-10-01 PROCEDURE — 99239 HOSP IP/OBS DSCHRG MGMT >30: CPT | Performed by: NURSE PRACTITIONER

## 2019-10-01 PROCEDURE — 80053 COMPREHEN METABOLIC PANEL: CPT | Performed by: INTERNAL MEDICINE

## 2019-10-01 PROCEDURE — 94799 UNLISTED PULMONARY SVC/PX: CPT

## 2019-10-01 PROCEDURE — 80185 ASSAY OF PHENYTOIN TOTAL: CPT | Performed by: INTERNAL MEDICINE

## 2019-10-01 PROCEDURE — 85025 COMPLETE CBC W/AUTO DIFF WBC: CPT | Performed by: INTERNAL MEDICINE

## 2019-10-01 PROCEDURE — 25010000002 HEPARIN (PORCINE) PER 1000 UNITS: Performed by: PHYSICIAN ASSISTANT

## 2019-10-01 PROCEDURE — 86140 C-REACTIVE PROTEIN: CPT | Performed by: NURSE PRACTITIONER

## 2019-10-01 PROCEDURE — 25010000002 VANCOMYCIN 5 G RECONSTITUTED SOLUTION 5,000 MG VIAL: Performed by: NURSE PRACTITIONER

## 2019-10-01 RX ORDER — PHENYTOIN SODIUM 200 MG/1
200 CAPSULE, EXTENDED RELEASE ORAL EVERY 12 HOURS SCHEDULED
Qty: 60 CAPSULE | Refills: 0 | Status: SHIPPED | OUTPATIENT
Start: 2019-10-01 | End: 2019-12-04 | Stop reason: SDUPTHER

## 2019-10-01 RX ORDER — PHENYTOIN SODIUM 100 MG/1
200 CAPSULE, EXTENDED RELEASE ORAL EVERY 12 HOURS SCHEDULED
Status: DISCONTINUED | OUTPATIENT
Start: 2019-10-01 | End: 2019-10-01 | Stop reason: HOSPADM

## 2019-10-01 RX ORDER — PHENYTOIN SODIUM 100 MG/1
100 CAPSULE, EXTENDED RELEASE ORAL EVERY 12 HOURS SCHEDULED
Status: DISCONTINUED | OUTPATIENT
Start: 2019-10-01 | End: 2019-10-01

## 2019-10-01 RX ORDER — L.ACID,PARA/B.BIFIDUM/S.THERM 8B CELL
1 CAPSULE ORAL DAILY
Qty: 4 CAPSULE | Refills: 0 | Status: SHIPPED | OUTPATIENT
Start: 2019-10-02 | End: 2019-10-06

## 2019-10-01 RX ORDER — LINEZOLID 600 MG/1
600 TABLET, FILM COATED ORAL EVERY 12 HOURS SCHEDULED
Status: DISCONTINUED | OUTPATIENT
Start: 2019-10-01 | End: 2019-10-01 | Stop reason: HOSPADM

## 2019-10-01 RX ORDER — LINEZOLID 600 MG/1
600 TABLET, FILM COATED ORAL EVERY 12 HOURS SCHEDULED
Qty: 6 TABLET | Refills: 0 | Status: SHIPPED | OUTPATIENT
Start: 2019-10-01 | End: 2019-10-04

## 2019-10-01 RX ORDER — PHENYTOIN SODIUM 200 MG/1
200 CAPSULE, EXTENDED RELEASE ORAL EVERY 12 HOURS SCHEDULED
Qty: 60 CAPSULE | Refills: 0 | Status: SHIPPED | OUTPATIENT
Start: 2019-10-01 | End: 2019-10-01

## 2019-10-01 RX ADMIN — SENNOSIDES 2 TABLET: 8.6 TABLET, FILM COATED ORAL at 08:23

## 2019-10-01 RX ADMIN — PHENYTOIN SODIUM 100 MG: 100 CAPSULE, EXTENDED RELEASE ORAL at 09:39

## 2019-10-01 RX ADMIN — BUSPIRONE HYDROCHLORIDE 5 MG: 5 TABLET ORAL at 08:23

## 2019-10-01 RX ADMIN — PANTOPRAZOLE SODIUM 40 MG: 40 TABLET, DELAYED RELEASE ORAL at 08:23

## 2019-10-01 RX ADMIN — BUDESONIDE AND FORMOTEROL FUMARATE DIHYDRATE 2 PUFF: 160; 4.5 AEROSOL RESPIRATORY (INHALATION) at 07:51

## 2019-10-01 RX ADMIN — ASPIRIN 81 MG: 81 TABLET, COATED ORAL at 08:23

## 2019-10-01 RX ADMIN — HEPARIN SODIUM 5000 UNITS: 5000 INJECTION INTRAVENOUS; SUBCUTANEOUS at 06:17

## 2019-10-01 RX ADMIN — VANCOMYCIN HYDROCHLORIDE 1500 MG: 5 INJECTION, POWDER, LYOPHILIZED, FOR SOLUTION INTRAVENOUS at 01:48

## 2019-10-01 RX ADMIN — CETIRIZINE HYDROCHLORIDE 10 MG: 10 TABLET, FILM COATED ORAL at 08:23

## 2019-10-01 RX ADMIN — Medication 1 CAPSULE: at 08:23

## 2019-10-01 NOTE — DISCHARGE SUMMARY
[facility]    Patient Identification:  Name:  Jaquan Mckay  Age:  48 y.o.  Sex:  male  :  1970  MRN:  [id number]  Visit Number:  [id number]    Date of Admission: 2019  Date of Discharge:  10/1/2019     PCP: Tiera Valenzuela APRN    DISCHARGE DIAGNOSES    Streptococcus bacteremia   Dizziness with recent syncope, unclear etiology following with cards outpatient  Seizure disorder   Essential HTN   Non-Obstructive CAD  Hx of DVT post-op,   Obesity, BMI 34.22    CONSULTS     Cardiology  Infectious Diease     PROCEDURES PERFORMED    CT of the head, 19  Transthoracic Echocardiogram, 19  Transesophageal Echocardiogram, 19  Carotid US bilateral, 19  CT of the head, 19    HPI:     Mrs. Mckay is a 48 year old male patient who was admitted to [facility] on 19 after being called back for positive blood cultures for his ED visit on 19, 2/2 Blood cultures showing streptococcus. He was admitted for further treatment and evaluation, please see H&P for further details regarding admission. His medical history is significant for seizures, dyslipidemia, PRANAY, non-obstructive CAD, hx of DVT after orthopedic surgery, and ongoing issues with syncope.     HOSPITAL COURSE    He was admitted to the medical floor and started on Azactam due to PCN allergies. Blood cultures were repeated on 19 and show no growth to date for 5 days. He had a transthoracic echocardiogram done on 19 which did not show any  valvular abnormalities.ID was also consulted and felt a VIJI would be beneficial and cardiology was consulted, VIJI was done on 19 which also did not show any valvular growths. His dilantin level was checked on 19 and was 23.7, he has had dose adjustments while here and dilantin level is therapeutic at 19.4 this morning and has not exhibited any seizure like activity during his hospital stay. His source of the bacteremia is unknown at  this time, possibly oral, chest xray and UA did not appreciate any acute infectious process.     He has been following with cardiology outpatient for ongoing syncope. He was given a 30 day monitor but felt it was too difficult to charge the battery and change the leads. I discussed the importance of this study with him and he continues to refuse to re-try this. Also encouraged him to keep his apt with neurology at  for further treatment and evaluation of his syncope and seizure disorder.        Discharge:     His final blood cultures from 9/24/19 were susceptible to vancomycin, ceftriaxone and PCN, due to his allergies it was recommended by ID he finish his course of antibiotics through 10/4/19 with zyvox 600mg PO BID. He does take buspar and doxepin, and can have interactions when these meds are used together. Discussed with pharmacy and will hold Buspar and doxepin until 10/5/19. Also discussed his dilantin dose with pharmacy and recommended 200mg PO BID of dilantin with a repeat level Friday. He has not had any witnessed seizure activity while in the hospital.       VITAL SIGNS:  Temp:  [98.1 °F (36.7 °C)-98.6 °F (37 °C)] 98.4 °F (36.9 °C)  Heart Rate:  [72-86] 72  Resp:  [16-19] 18  BP: (100-127)/(57-78) 100/63  SpO2:  [96 %-100 %] 97 %  on   ;   Device (Oxygen Therapy): room air    Body mass index is 34.22 kg/m².  Wt Readings from Last 3 Encounters:   09/26/19 99.1 kg (218 lb 8 oz)   09/24/19 95.7 kg (211 lb)   09/16/19 96.6 kg (213 lb)       PHYSICAL EXAM:    Physical Exam   Constitutional: He is oriented to person, place, and time. He appears well-developed and well-nourished.   HENT:   Head: Normocephalic and atraumatic.   Eyes: Pupils are equal, round, and reactive to light.   Neck: Normal range of motion. Neck supple.   Cardiovascular: Normal rate and regular rhythm.   Pulmonary/Chest: Effort normal and breath sounds normal.   Abdominal: Soft.   Musculoskeletal: Normal range of motion.   Neurological: He  is alert and oriented to person, place, and time.   Skin: Skin is warm.   Psychiatric: He has a normal mood and affect. His behavior is normal. Judgment and thought content normal.   Vitals reviewed.      DISCHARGE DISPOSITION   Stable    DISCHARGE MEDICATIONS:     Discharge Medications      New Medications      Instructions Start Date   lactobacillus acidophilus capsule capsule   1 capsule, Oral, Daily   Start Date:  10/2/2019     linezolid 600 MG tablet  Commonly known as:  ZYVOX   600 mg, Oral, Every 12 Hours Scheduled         Changes to Medications      Instructions Start Date   phenytoin extended 200 MG ER capsule  Commonly known as:  DILANTIN  What changed:    · medication strength  · how much to take  · how to take this  · when to take this  · additional instructions   200 mg, Oral, Every 12 Hours Scheduled         Continue These Medications      Instructions Start Date   albuterol (2.5 MG/3ML) 0.083% nebulizer solution  Commonly known as:  PROVENTIL   2.5 mg, Nebulization, Every 4 Hours PRN      albuterol sulfate  (90 Base) MCG/ACT inhaler  Commonly known as:  PROVENTIL HFA;VENTOLIN HFA;PROAIR HFA   2 puffs, Inhalation, Every 4 Hours PRN      ASPIRIN ADULT LOW STRENGTH 81 MG EC tablet  Generic drug:  aspirin   TAKE 1 TABLET BY MOUTH DAILY FOR HEART HEALTH AND CIRCULATION      atorvastatin 40 MG tablet  Commonly known as:  LIPITOR   40 mg, Oral, Daily      budesonide-formoterol 160-4.5 MCG/ACT inhaler  Commonly known as:  SYMBICORT   2 puffs, Inhalation, 2 Times Daily      busPIRone 5 MG tablet  Commonly known as:  BUSPAR  Notes to patient:  May resume on 10/5/19   5 mg, Oral, 3 Times Daily      diphenhydrAMINE 25 MG tablet  Commonly known as:  BENADRYL ALLERGY   25 mg, Oral, Every 6 Hours PRN      doxepin 25 MG capsule  Commonly known as:  SINEquan  Notes to patient:  May resume on 10/5/19   1 capsule 2-3 hours before bedtime      HYDROcodone-acetaminophen  MG per tablet  Commonly known as:   NORCO   1 tablet, Oral, 2 Times Daily PRN      levocetirizine 5 MG tablet  Commonly known as:  XYZAL   5 mg, Oral, Daily      lisinopril 20 MG tablet  Commonly known as:  PRINIVIL,ZESTRIL   20 mg, Oral, Daily, FOR BLOOD PRESSURE      meclizine 12.5 MG tablet  Commonly known as:  ANTIVERT   12.5 mg, Oral, 3 Times Daily PRN      montelukast 10 MG tablet  Commonly known as:  SINGULAIR   10 mg, Oral, Nightly      pantoprazole 40 MG EC tablet  Commonly known as:  PROTONIX   40 mg, Oral, Daily         Stop These Medications    ibuprofen 600 MG tablet  Commonly known as:  ADVIL,MOTRIN            Diet Instructions     Diet as tolerated.               Activity Instructions     Activity as tolerated.               Additional Instructions for the Follow-ups that You Need to Schedule     Discharge Follow-up with PCP   As directed       Currently Documented PCP:    Tiera Valenzuela APRN    PCP Phone Number:    [phone]     Follow Up Details:  2-3 days, needs dilantin level Friday         Discharge Follow-up with Specified Provider: CardiologyDr. Daily's office, syncope; 1 Week   As directed      To:  CardiologyDr. Daily's office, syncope    Follow Up:  1 Week         Discharge Follow-up with Specified Provider: Re-schedule missed neurology apt with  [facility] for syncope, seizures; 1 Week   As directed      To:  Re-schedule missed neurology apt with [facility] for syncope, seizures    Follow Up:  1 Week           Follow-up Information     Tiera Valenzuela APRN .    Specialty:  Family Medicine  Why:  2-3 days, needs dilantin level Friday  Contact information:  [address] [place] [address]  [phone]                    TEST  RESULTS PENDING AT DISCHARGE  none         CODE STATUS  Code Status and Medical Interventions:   Ordered at: 09/25/19 2223     Code Status:    CPR     Medical Interventions (Level of Support Prior to Arrest):    Full       BALDOMERO Montesinos  [facility]  10/01/19  11:22  AM    Please note that this discharge summary required more than 30 minutes to complete.

## 2019-10-01 NOTE — PROGRESS NOTES
PROGRESS NOTE         Patient Identification:  Name:  Jaquan Mckay  Age:  48 y.o.  Sex:  male  :  1970  MRN:  [id number]  Visit Number:  [id number]  Primary Care Provider:  Tiera Valenzuela APRN      ----------------------------------------------------------------------------------------------------------------------  Subjective       Chief Complaints:    Abnormal Lab and Chest Pain        Interval History:      He was resting in bed this morning, denies any complaints.CRP level and white count are both normal. No reported fever. Repeat blood cultures finalized as no growth. VIJI was reported negative.     Review of Systems:    Constitutional: no fever, chills and night sweats. No appetite change or unexpected weight change. No fatigue.  Eyes: no eye drainage, itching or redness.  HEENT: no mouth sores, dysphagia or nose bleed.  Respiratory: no for shortness of breath, cough or production of sputum.  Cardiovascular: no chest pain, no palpitations, no orthopnea.  Gastrointestinal: no nausea, vomiting or diarrhea. No abdominal pain, hematemesis or rectal bleeding.  Genitourinary: no dysuria or polyuria.  Hematologic/lymphatic: no lymph node abnormalities, no easy bruising or easy bleeding.  Musculoskeletal: no muscle or joint pain.  Skin: No rash and no itching.  Neurological: no loss of consciousness, no seizure, no headache.  Behavioral/Psych: no depression or suicidal ideation.  Endocrine: no hot flashes.  Immunologic: negative.  ----------------------------------------------------------------------------------------------------------------------      Objective       Current [facility] Meds:    aspirin 81 mg Oral Daily   atorvastatin 40 mg Oral Nightly   budesonide-formoterol 2 puff Inhalation BID   busPIRone 5 mg Oral TID   cetirizine 10 mg Oral Daily   doxepin 25 mg Oral Nightly   heparin (porcine) 5,000 Units Subcutaneous Q8H   lactobacillus acidophilus 1 capsule Oral Daily   linezolid 600 mg  Oral Q12H   lisinopril 20 mg Oral Daily   montelukast 10 mg Oral Nightly   pantoprazole 40 mg Oral Daily   phenytoin 200 mg Oral Q12H   senna 2 tablet Oral Daily   sodium chloride 10 mL Intravenous Q12H   vancomycin 1,500 mg Intravenous Q12H        ----------------------------------------------------------------------------------------------------------------------    Vital Signs:  Temp:  [98.1 °F (36.7 °C)-98.6 °F (37 °C)] 98.4 °F (36.9 °C)  Heart Rate:  [72-86] 72  Resp:  [14-19] 18  BP: (100-141)/(57-84) 100/63  No data found.  SpO2 Percentage    09/30/19 1153 09/30/19 1955 10/01/19 0700   SpO2: 100% 96% 97%     SpO2:  [96 %-100 %] 97 %  on   ;   Device (Oxygen Therapy): room air    Body mass index is 34.22 kg/m².  Wt Readings from Last 3 Encounters:   09/26/19 99.1 kg (218 lb 8 oz)   09/24/19 95.7 kg (211 lb)   09/16/19 96.6 kg (213 lb)        Intake/Output Summary (Last 24 hours) at 10/1/2019 1021  Last data filed at 10/1/2019 0300  Gross per 24 hour   Intake 1270 ml   Output 1000 ml   Net 270 ml     Diet Regular  ----------------------------------------------------------------------------------------------------------------------    Physical exam:    Constitutional:  Well-developed and well-nourished.  No respiratory distress.      HENT:  Head: Normocephalic and atraumatic.  Mouth:  Moist mucous membranes.    Eyes:  Conjunctivae and EOM are normal.  No scleral icterus.  Neck:  Neck supple.  No JVD present.    Cardiovascular:  Normal rate, regular rhythm and normal heart sounds with no murmur. No edema.  Pulmonary/Chest:  No respiratory distress, no wheezes, no crackles, with normal breath sounds and good air movement.  Abdominal:  Soft.  Bowel sounds are normal.  No distension and no tenderness.   Musculoskeletal:  No edema, no tenderness, and no deformity.  No swelling or redness of joints.  Neurological:  Alert and oriented to person, place, and time.  No facial droop.  No slurred speech.   Skin:  Tattoos,    Psychiatric:  Normal mood and affect.  Behavior is normal.    ----------------------------------------------------------------------------------------------------------------------  I have personally reviewed the EKG/Telemetry strips   ----------------------------------------------------------------------------------------------------------------------  Results from last 7 days   Lab Units 09/26/19  0923 09/26/19  0331 09/25/19 2001   TROPONIN T ng/mL <0.010 <0.010 <0.010             Results from last 7 days   Lab Units 10/01/19  0514 09/30/19  0137 09/29/19  0444  09/28/19  0558 09/27/19  0404  09/25/19 2001   CRP mg/dL 0.20 0.22 0.22   < >  --  0.25  --   --    LACTATE mmol/L  --   --   --   --   --   --   --  1.3   WBC 10*3/mm3 6.37  --   --   --  6.77 6.81   < > 7.28   HEMOGLOBIN g/dL 14.1  --   --   --  14.9 14.6   < > 15.4   HEMATOCRIT % 41.1  --   --   --  43.7 42.5   < > 44.0   MCV fL 96.5  --   --   --  96.3 95.7   < > 94.4   MCHC g/dL 34.3  --   --   --  34.1 34.4   < > 35.0   PLATELETS 10*3/mm3 175  --   --   --  185 195   < > 201    < > = values in this interval not displayed.     Results from last 7 days   Lab Units 10/01/19  0514 09/28/19  1030 09/26/19  0331 09/25/19 2001   SODIUM mmol/L 138 139 139 139   POTASSIUM mmol/L 4.2 4.1 3.8 4.0   CHLORIDE mmol/L 103 103 102 99   CO2 mmol/L 24.3 25.7 24.1 28.8   BUN mg/dL 10 9 8 9   CREATININE mg/dL 0.87 0.87 0.83 0.85   EGFR IF NONAFRICN AM mL/min/1.73 94 94 100 97   CALCIUM mg/dL 8.4* 9.1 9.0 9.5   GLUCOSE mg/dL 86 78 93 99   ALBUMIN g/dL 3.47*  --  3.90 4.67   BILIRUBIN mg/dL 0.3  --  0.3 0.2   ALK PHOS U/L 65  --  74 84   AST (SGOT) U/L 25  --  25 21   ALT (SGPT) U/L 27  --  18 21   Estimated Creatinine Clearance: 119 mL/min (by C-G formula based on SCr of 0.87 mg/dL).  No results found for: AMMONIA    No results found for: HGBA1C, POCGLU  Lab Results   Component Value Date    HGBA1C 4.80 08/06/2019     Lab Results   Component Value Date    TSH 2.110  08/06/2019    FREET4 0.99 08/06/2019       Blood Culture   Date Value Ref Range Status   09/25/2019 No growth at 24 hours  Preliminary   09/25/2019 No growth at 24 hours  Preliminary   09/24/2019 Corynebacterium species (A)  Final     Comment:     Probable contaminant requires clinical correlation, susceptibility not performed unless requested by physician.     09/24/2019 Streptococcus mitis / oralis (A)  Final                 Pain Management Panel     Pain Management Panel Latest Ref Rng & Units 9/25/2019 9/24/2019    AMPHETAMINES SCREEN, URINE Negative Negative Negative    BARBITURATES SCREEN Negative Negative Negative    BENZODIAZEPINE SCREEN, URINE Negative Negative Negative    BUPRENORPHINEUR Negative Negative Negative    COCAINE SCREEN, URINE Negative Negative Negative    METHADONE SCREEN, URINE Negative Negative Negative          I have personally reviewed the above laboratory results.   ----------------------------------------------------------------------------------------------------------------------  Imaging Results (last 24 hours)     Procedure Component Value Units Date/Time    CT Head Without Contrast [[id number]] Collected:  09/30/19 1615     Updated:  09/30/19 1619    Narrative:       EXAMINATION: CT HEAD WO CONTRAST-      CLINICAL INDICATION:     Persistent HA with recent syncope and fall;  R78.81-Bacteremia     COMPARISON:    09/24/2019     Technique: Multiple CT axial images were obtained through the level of  the brain without IV contrast administration. Reformatted images in the  coronal and/or sagittal plane(s) were generated from the axial data set  to facilitate diagnostic accuracy and/or surgical planning.     Radiation dose reduction techniques were utilized per ALARA protocol.  Automated exposure control was initiated through either or [employer] Portal or  DoseRight software packages by  protocol.       DOSE (DLP mGy-cm):     FINDINGS:     Brain: Encephalomalacia of the right temporal  region with overlying  craniotomy defect is stable from the previous exam. Otherwise, no  parenchymal lesions identified and no parenchymal hemorrhages are noted.  No areas of mass effect.  Ventricles: Unremarkable. No hydrocephalus.  Extra-axial spaces: Unremarkable. No extra-axial hemorrhage. No  extra-axial masses.  Bones: Stable craniotomy changes right middle cranial fossa region.  Sinuses: Unremarkable as visualized. No air-fluid levels.  Mastoids: Unremarkable. No mastoid effusions.  Soft Tissues: Unremarkable.          Impression:       Stable appearance of the brain with no CT evidence of acute  intracranial abnormality identified.     This report was finalized on 9/30/2019 4:17 PM by Dr. Raul Calixto MD.           I have personally reviewed the above radiology results.   ----------------------------------------------------------------------------------------------------------------------    Assessment/Plan       Assessment/Plan     ASSESSMENT:    1. Streptococcus Bacteremia    PLAN:    He was resting in bed this morning, denies any complaints.CRP level and white count are both normal. No reported fever. Repeat blood cultures finalized as no growth. VIJI was reported negative.     Based on cultures and Zyvox contraindication due to drug drug interactions, Vancomycin will need to continue through 10/04/19. Multiple allergies noted.     Blood cultures collected on 9/24/2019 have finalized 1 as corynebacterium most likely contaminant and to Streptococcus mitis susceptible to Rocephin and vancomycin.    VIJI reports no vegetation.    Case discussed with Dr. Galvan.    Code Status:   Code Status and Medical Interventions:   Ordered at: 09/25/19 2223     Code Status:    CPR     Medical Interventions (Level of Support Prior to Arrest):    Full       Daja Messina, APRN  10/01/19  10:21 AM

## 2019-10-01 NOTE — DISCHARGE INSTRUCTIONS
Note:  May resume Buspar/Buspirone and sinequan/Doxepin on 10/5/2019, 24 hours after last dose of zyvox.

## 2019-10-01 NOTE — DISCHARGE INSTR - APPOINTMENTS
Keep neurology and cardiology appointments as scheduled - see above.    Please have labs done at Dr. Valenzuela's office on Friday, October 4, 2019 for Dilantin level anytime between the hours of 8:00 and 11:00 am.

## 2019-10-01 NOTE — PLAN OF CARE
Problem: Patient Care Overview  Goal: Plan of Care Review  Outcome: Outcome(s) achieved Date Met: 10/01/19    Goal: Discharge Needs Assessment  Outcome: Ongoing (interventions implemented as appropriate)      Problem: Pain, Acute (Adult)  Goal: Acceptable Pain Control/Comfort Level  Outcome: Ongoing (interventions implemented as appropriate)      Problem: Infection, Risk/Actual (Adult)  Goal: Identify Related Risk Factors and Signs and Symptoms  Outcome: Outcome(s) achieved Date Met: 10/01/19    Goal: Infection Prevention/Resolution  Outcome: Ongoing (interventions implemented as appropriate)      Problem: Skin Injury Risk (Adult)  Goal: Identify Related Risk Factors and Signs and Symptoms  Outcome: Outcome(s) achieved Date Met: 10/01/19    Goal: Skin Health and Integrity  Outcome: Ongoing (interventions implemented as appropriate)      Problem: Fall Risk (Adult)  Goal: Absence of Fall  Outcome: Ongoing (interventions implemented as appropriate)      Problem: Pain, Chronic (Adult)  Goal: Identify Related Risk Factors and Signs and Symptoms  Outcome: Outcome(s) achieved Date Met: 10/01/19    Goal: Acceptable Pain/Comfort Level and Functional Ability  Outcome: Ongoing (interventions implemented as appropriate)

## 2019-10-01 NOTE — PLAN OF CARE
Problem: Patient Care Overview  Goal: Plan of Care Review  Outcome: Ongoing (interventions implemented as appropriate)      Problem: Pain, Acute (Adult)  Goal: Acceptable Pain Control/Comfort Level  Outcome: Ongoing (interventions implemented as appropriate)      Problem: Infection, Risk/Actual (Adult)  Goal: Identify Related Risk Factors and Signs and Symptoms  Outcome: Ongoing (interventions implemented as appropriate)      Problem: Skin Injury Risk (Adult)  Goal: Identify Related Risk Factors and Signs and Symptoms  Outcome: Ongoing (interventions implemented as appropriate)      Problem: Fall Risk (Adult)  Goal: Absence of Fall  Outcome: Ongoing (interventions implemented as appropriate)      Problem: Pain, Chronic (Adult)  Goal: Identify Related Risk Factors and Signs and Symptoms  Outcome: Ongoing (interventions implemented as appropriate)

## 2019-10-01 NOTE — PROGRESS NOTES
Discharge Planning Assessment   [facility]     Patient Name: Jaquan Mckay  MRN: [id number]  Today's Date: 10/1/2019    Admit Date: 9/25/2019      Discharge Plan     Row Name 10/01/19 1139       Plan    Final Discharge Disposition Code  01 - home or self-care    Final Note Pt is being discharged home today. No other needs identified.         SARIKA Hensley

## 2019-10-02 ENCOUNTER — READMISSION MANAGEMENT (OUTPATIENT)
Dept: CALL CENTER | Facility: HOSPITAL | Age: 49
End: 2019-10-02

## 2019-10-02 NOTE — OUTREACH NOTE
Prep Survey      Responses   Facility patient discharged from?  [facility]   Is patient eligible?  Yes   Discharge diagnosis  Streptococcus bacteremia, Dizziness with  recent syncope, seizure disorder, essential HTN, non-obstructive CAD, hx DVT, obesity   Does the patient have one of the following disease processes/diagnoses(primary or secondary)?  Other   Does the patient have Home health ordered?  No   Is there a DME ordered?  No   Comments regarding appointments  See AVS   Prep survey completed?  Yes          Aviva Hurd RN

## 2019-10-03 ENCOUNTER — READMISSION MANAGEMENT (OUTPATIENT)
Dept: CALL CENTER | Facility: HOSPITAL | Age: 49
End: 2019-10-03

## 2019-10-03 NOTE — OUTREACH NOTE
Medical Week 1 Survey      Responses   Facility patient discharged from?  [facility]   Does the patient have one of the following disease processes/diagnoses(primary or secondary)?  Other   Is there a successful TCM telephone encounter documented?  No   Week 1 attempt successful?  Yes   Call start time  1325   Revoke  Phone number issues          Jamia Mo RN

## 2019-10-04 ENCOUNTER — LAB (OUTPATIENT)
Dept: LAB | Facility: HOSPITAL | Age: 49
End: 2019-10-04

## 2019-10-04 DIAGNOSIS — G40.909 SEIZURE DISORDER (HCC): ICD-10-CM

## 2019-10-04 PROCEDURE — 80185 ASSAY OF PHENYTOIN TOTAL: CPT

## 2019-10-04 PROCEDURE — 80186 ASSAY OF PHENYTOIN FREE: CPT

## 2019-10-05 LAB — PHENYTOIN SERPL-MCNC: 16.6 MCG/ML (ref 10–20)

## 2019-10-07 LAB — PHENYTOIN FREE SERPL-MCNC: 1.6 UG/ML (ref 1–2)

## 2019-10-10 ENCOUNTER — OFFICE VISIT (OUTPATIENT)
Dept: CARDIOLOGY | Facility: CLINIC | Age: 49
End: 2019-10-10

## 2019-10-10 VITALS
HEIGHT: 67 IN | DIASTOLIC BLOOD PRESSURE: 99 MMHG | BODY MASS INDEX: 34.53 KG/M2 | RESPIRATION RATE: 14 BRPM | SYSTOLIC BLOOD PRESSURE: 146 MMHG | WEIGHT: 220 LBS | HEART RATE: 78 BPM

## 2019-10-10 DIAGNOSIS — R55 SYNCOPE AND COLLAPSE: ICD-10-CM

## 2019-10-10 DIAGNOSIS — E78.2 MIXED HYPERLIPIDEMIA: Primary | ICD-10-CM

## 2019-10-10 DIAGNOSIS — I10 ESSENTIAL HYPERTENSION: ICD-10-CM

## 2019-10-10 PROCEDURE — 99214 OFFICE O/P EST MOD 30 MIN: CPT | Performed by: NURSE PRACTITIONER

## 2019-10-10 RX ORDER — LISINOPRIL 20 MG/1
40 TABLET ORAL DAILY
Qty: 60 TABLET | Refills: 5
Start: 2019-10-10 | End: 2020-07-09 | Stop reason: SDUPTHER

## 2019-10-10 RX ORDER — BLOOD-GLUCOSE METER
1 EACH MISCELLANEOUS DAILY
Qty: 1 EACH | Refills: 0 | Status: SHIPPED | OUTPATIENT
Start: 2019-10-10 | End: 2020-11-03

## 2019-10-10 NOTE — PROGRESS NOTES
Tiera Valenzuela APRN  Jaquan Mckay  1970  10/10/2019    Patient Active Problem List   Diagnosis   • Gastroesophageal reflux disease without esophagitis   • Arthritis   • Hypertension   • Seizure disorder (CMS/HCC)   • Hyperlipidemia   • Anxiety   • Varicocele   • Varicocele present on ultrasound of scrotum   • Obesity (BMI 30.0-34.9)   • Chronic bilateral low back pain with left-sided sciatica   • Seasonal allergic rhinitis due to pollen   • Mild persistent asthma with acute exacerbation   • Precordial pain   • Dysuria   • Congenital coronary artery anomaly   • BMI 35.0-35.9,adult   • Chondromalacia, knee   • Achilles tendinitis of both lower extremities   • Muscle spasm of both lower legs   • Personal history of allergy to shellfish   • Sensation of cold in lower extremity   • Varicose vein of leg   • Heart palpitations   • Shortness of breath   • Generalized anxiety disorder   • Chronic elbow pain, right   • Chest pain   • Unstable angina (CMS/[facility])   • ASCVD (arteriosclerotic cardiovascular disease)   • Elevated prolactin level (CMS/[facility])   • Other constipation   • Class 1 obesity due to excess calories with serious comorbidity and body mass index (BMI) of 33.0 to 33.9 in adult   • Bacteremia       Dear Tiera Valenzuela APRN:    Subjective     Chief Complaint   Patient presents with   • Hypertension   • Chest Pain           History of Present Illness:    Jaquan Mckay is a 48 y.o. male with a history of hypertension, hyperlipidemia, chronic chest pain, and previous reports of syncope.  He was admitted to [facility] on 9/25/2019 due to bacteremia.  He did undergo a transesophageal echocardiogram which revealed normal EF with no vegetations and no significant valvular abnormalities.  He did report a syncopal event prior to admission.  However, he had work-up at the time was unremarkable.  A 30-day event monitor has been ordered in the past but the patient was not compliant with this.  He reports  "since discharge he has had no further near syncope or syncope.  He denies any dizziness or lightheadedness.  His blood pressure has been running high and is elevated in the office today.          Allergies   Allergen Reactions   • Ciprofloxacin Anaphylaxis and Hives   • Paxil [Paroxetine Hcl] Shortness Of Breath     Chest pain    • Peanut-Containing Drug Products Anaphylaxis   • Penicillins Anaphylaxis   • Sulfa Antibiotics Anaphylaxis, Itching and Rash   • Isosorbide Nitrate Rash     Rash, hives, had to use inhaler.    • Doxycycline Hives   • Fish-Derived Products Hives   • Mobic [Meloxicam] Other (See Comments)     Pt states, \"It make my feet and hands go numb and I can't hardly walk.\"    • Pristiq [Desvenlafaxine Succinate Er] Dizziness   • Robitussin Cough+ Chest Max St [Dextromethorphan-Guaifenesin] Unknown (See Comments)     Pt states, \"I don't remember its been so long.\"    • Zoloft [Sertraline Hcl] Hives and Itching   • Clarithromycin Rash   • Contrast Dye Itching and Rash   • Diltiazem Rash   • Gabapentin Rash   • Keflex [Cephalexin] Rash   • Metoprolol Rash   • Prednisone Rash and Other (See Comments)     Face, feet, and legs go completely numb per patient   • Shrimp (Diagnostic) Rash   • Spironolactone Rash   • Viibryd [Vilazodone Hcl] Itching and Rash   :      Current Outpatient Medications:   •  albuterol (PROVENTIL) (2.5 MG/3ML) 0.083% nebulizer solution, Take 2.5 mg by nebulization Every 4 (Four) Hours As Needed for Shortness of Air., Disp: 180 vial, Rfl: 5  •  albuterol sulfate  (90 Base) MCG/ACT inhaler, Inhale 2 puffs Every 4 (Four) Hours As Needed for Shortness of Air., Disp: 1 inhaler, Rfl: 5  •  ASPIRIN ADULT LOW STRENGTH 81 MG EC tablet, TAKE 1 TABLET BY MOUTH DAILY FOR HEART HEALTH AND CIRCULATION, Disp: 30 tablet, Rfl: 3  •  atorvastatin (LIPITOR) 40 MG tablet, Take 1 tablet by mouth Daily., Disp: 90 tablet, Rfl: 3  •  budesonide-formoterol (SYMBICORT) 160-4.5 MCG/ACT inhaler, Inhale 2 " puffs 2 (Two) Times a Day., Disp: 10.2 g, Rfl: 5  •  busPIRone (BUSPAR) 5 MG tablet, Take 1 tablet by mouth 3 (Three) Times a Day., Disp: 90 tablet, Rfl: 2  •  diphenhydrAMINE (BENADRYL ALLERGY) 25 MG tablet, Take 1 tablet by mouth Every 6 (Six) Hours As Needed for Itching or Allergies., Disp: 30 tablet, Rfl: 1  •  doxepin (SINEquan) 25 MG capsule, 1 capsule 2-3 hours before bedtime, Disp: 30 capsule, Rfl: 5  •  HYDROcodone-acetaminophen (NORCO)  MG per tablet, Take 1 tablet by mouth 2 (Two) Times a Day As Needed for Moderate Pain ., Disp: 60 tablet, Rfl: 0  •  lisinopril (PRINIVIL,ZESTRIL) 20 MG tablet, Take 2 tablets by mouth Daily. FOR BLOOD PRESSURE, Disp: 60 tablet, Rfl: 5  •  meclizine (ANTIVERT) 12.5 MG tablet, Take 1 tablet by mouth 3 (Three) Times a Day As Needed for dizziness., Disp: 30 tablet, Rfl: 0  •  montelukast (SINGULAIR) 10 MG tablet, Take 1 tablet by mouth Every Night., Disp: 30 tablet, Rfl: 5  •  pantoprazole (PROTONIX) 40 MG EC tablet, Take 1 tablet by mouth Daily., Disp: 30 tablet, Rfl: 5  •  phenytoin extended (DILANTIN) 200 MG ER capsule, Take 1 capsule by mouth Every 12 (Twelve) Hours., Disp: 60 capsule, Rfl: 0  •  Blood Pressure Monitoring (HEALTH SENSE BP MONITOR) device, 1 each Daily., Disp: 1 each, Rfl: 0  •  levocetirizine (XYZAL) 5 MG tablet, Take 5 mg by mouth Daily., Disp: , Rfl:       The following portions of the patient's history were reviewed and updated as appropriate: allergies, current medications, past family history, past medical history, past social history, past surgical history and problem list.    Social History     Tobacco Use   • Smoking status: Current Every Day Smoker     Packs/day: 2.00     Years: 17.00     Pack years: 34.00     Types: Cigars, Cigarettes     Start date: 5/5/2010   • Smokeless tobacco: Never Used   Substance Use Topics   • Alcohol use: No   • Drug use: No       Review of Systems   Constitution: Negative for weakness and malaise/fatigue.  "  Cardiovascular: Positive for syncope. Negative for chest pain, dyspnea on exertion, irregular heartbeat, leg swelling, near-syncope, orthopnea, palpitations and paroxysmal nocturnal dyspnea.   Respiratory: Negative for cough, shortness of breath and wheezing.    Neurological: Negative for dizziness and light-headedness.       Objective   Vitals:    10/10/19 1400   BP: 146/99   Pulse: 78   Resp: 14   Weight: 99.8 kg (220 lb)   Height: 170.2 cm (67\")     Body mass index is 34.46 kg/m².        Physical Exam   Constitutional: He is oriented to person, place, and time. He appears well-developed and well-nourished.   HENT:   Head: Normocephalic and atraumatic.   Cardiovascular: Normal rate, regular rhythm and normal heart sounds. Exam reveals no S3 and no S4.   No murmur heard.  Pulmonary/Chest: Effort normal and breath sounds normal. He has no wheezes. He has no rales.   Abdominal: Soft. Bowel sounds are normal.   Musculoskeletal: He exhibits no edema.   Neurological: He is alert and oriented to person, place, and time.   Skin: Skin is warm and dry.   Psychiatric: He has a normal mood and affect. His behavior is normal.       Lab Results   Component Value Date     10/01/2019    K 4.2 10/01/2019     10/01/2019    CO2 24.3 10/01/2019    BUN 10 10/01/2019    CREATININE 0.87 10/01/2019    GLUCOSE 86 10/01/2019    CALCIUM 8.4 (L) 10/01/2019    AST 25 10/01/2019    ALT 27 10/01/2019    ALKPHOS 65 10/01/2019    LABIL2 1.1 (L) 05/29/2016     Lab Results   Component Value Date    CKTOTAL 153 06/20/2019     Lab Results   Component Value Date    WBC 6.37 10/01/2019    HGB 14.1 10/01/2019    HCT 41.1 10/01/2019     10/01/2019     Lab Results   Component Value Date    INR 0.98 09/24/2019    INR 1.03 02/06/2018    INR 0.97 09/26/2017     Lab Results   Component Value Date    MG 1.8 08/06/2019     Lab Results   Component Value Date    TSH 2.110 08/06/2019    PSA 0.8 08/06/2019    CHLPL 232 (H) 04/05/2016    TRIG 50 " 08/12/2019    HDL 81 (H) 08/12/2019     (H) 08/12/2019            Procedures      Assessment/Plan    Diagnosis Plan   1. Mixed hyperlipidemia     2. Essential hypertension  Blood Pressure Monitoring (HEALTH SENSE BP MONITOR) device    lisinopril (PRINIVIL,ZESTRIL) 20 MG tablet    Basic Metabolic Panel   3. Syncope and collapse                  Recommendations:    1.  I have again recommended a 30-day event monitor to evaluate the syncope for which the patient has again declined.  I then offered to 21-day Holter monitor which might be easier for him to wear.  He has also declined this.      2.  For his elevated blood pressure, will increase lisinopril to 20 mg twice daily.    3.  BMP in 1 week.    4.  Follow-up in 4 months and if needed.      Return in about 4 months (around 2/10/2020) for Recheck.    As always, I appreciate very much the opportunity to participate in the cardiovascular care of your patients.      With Best Regards,    BALDOMERO Horowitz

## 2019-10-14 NOTE — ADDENDUM NOTE
Addendum  created 10/14/19 0713 by Mohit Stephens MD    Delete clinical note, Sign clinical note

## 2019-10-14 NOTE — ANESTHESIA POSTPROCEDURE EVALUATION
Patient: Jaquan Mckay    Procedure Summary     Date:  09/30/19 Room / Location:  [facility] NONINVASIVE LAB    Anesthesia Start:  1109 Anesthesia Stop:  1123    Procedure:  ADULT TRANSESOPHAGEAL ECHO (VIJI) W/ CONT IF NECESSARY PER PROTOCOL Diagnosis:  (Endocarditis)    Scheduled Providers:  Syd Dong MD Provider:  Mohit Stephens MD    Anesthesia Type:  general ASA Status:  3          Anesthesia Type: general  Last vitals  BP   100/63 (10/01/19 0700)   Temp   98.4 °F (36.9 °C) (10/01/19 0700)   Pulse   72 (10/01/19 0700)   Resp   18 (10/01/19 0700)     SpO2   97 % (10/01/19 0700)     Post Anesthesia Care and Evaluation    Patient location during evaluation: PHASE II  Patient participation: complete - patient participated  Level of consciousness: awake and alert  Pain score: 0  Pain management: adequate  Airway patency: patent  Anesthetic complications: No anesthetic complications    Cardiovascular status: acceptable  Respiratory status: acceptable  Hydration status: acceptable

## 2019-10-14 NOTE — ANESTHESIA POSTPROCEDURE EVALUATION
Patient: Jaquan Mckay    Procedure Summary     Date:  09/30/19 Room / Location:  [facility] NONINVASIVE LAB    Anesthesia Start:  1109 Anesthesia Stop:  1123    Procedure:  ADULT TRANSESOPHAGEAL ECHO (VIJI) W/ CONT IF NECESSARY PER PROTOCOL Diagnosis:  (Endocarditis)    Scheduled Providers:  Syd Dong MD Provider:  Mohit Stephens MD    Anesthesia Type:  general ASA Status:  3          Anesthesia Type: general  Last vitals  BP   100/63 (10/01/19 0700)   Temp   98.4 °F (36.9 °C) (10/01/19 0700)   Pulse   72 (10/01/19 0700)   Resp   18 (10/01/19 0700)     SpO2   97 % (10/01/19 0700)     Post Anesthesia Care and Evaluation    Patient location during evaluation: bedside  Patient participation: complete - patient participated  Level of consciousness: awake and alert  Pain score: 1  Pain management: adequate  Airway patency: patent  Anesthetic complications: No anesthetic complications  PONV Status: none  Cardiovascular status: acceptable  Respiratory status: acceptable  Hydration status: acceptable

## 2019-10-15 ENCOUNTER — OFFICE VISIT (OUTPATIENT)
Dept: FAMILY MEDICINE CLINIC | Facility: CLINIC | Age: 49
End: 2019-10-15

## 2019-10-15 VITALS
HEIGHT: 67 IN | BODY MASS INDEX: 35.16 KG/M2 | DIASTOLIC BLOOD PRESSURE: 80 MMHG | HEART RATE: 87 BPM | OXYGEN SATURATION: 98 % | WEIGHT: 224 LBS | SYSTOLIC BLOOD PRESSURE: 140 MMHG | TEMPERATURE: 97 F

## 2019-10-15 DIAGNOSIS — G89.29 CHRONIC BILATERAL LOW BACK PAIN WITH LEFT-SIDED SCIATICA: ICD-10-CM

## 2019-10-15 DIAGNOSIS — K21.9 GASTROESOPHAGEAL REFLUX DISEASE WITHOUT ESOPHAGITIS: ICD-10-CM

## 2019-10-15 DIAGNOSIS — M62.838 MUSCLE SPASM OF BOTH LOWER LEGS: ICD-10-CM

## 2019-10-15 DIAGNOSIS — G89.29 CHRONIC ELBOW PAIN, RIGHT: ICD-10-CM

## 2019-10-15 DIAGNOSIS — M54.42 CHRONIC BILATERAL LOW BACK PAIN WITH LEFT-SIDED SCIATICA: ICD-10-CM

## 2019-10-15 DIAGNOSIS — M25.521 CHRONIC ELBOW PAIN, RIGHT: ICD-10-CM

## 2019-10-15 DIAGNOSIS — M94.262 CHONDROMALACIA OF LEFT KNEE: ICD-10-CM

## 2019-10-15 DIAGNOSIS — F41.1 GENERALIZED ANXIETY DISORDER: Primary | ICD-10-CM

## 2019-10-15 PROCEDURE — 99214 OFFICE O/P EST MOD 30 MIN: CPT | Performed by: NURSE PRACTITIONER

## 2019-10-15 RX ORDER — HYDROCODONE BITARTRATE AND ACETAMINOPHEN 10; 325 MG/1; MG/1
1 TABLET ORAL 2 TIMES DAILY PRN
Qty: 60 TABLET | Refills: 0 | Status: SHIPPED | OUTPATIENT
Start: 2019-10-15 | End: 2019-11-12 | Stop reason: SDUPTHER

## 2019-10-15 RX ORDER — BACLOFEN 10 MG/1
5-10 TABLET ORAL 2 TIMES DAILY
Qty: 60 TABLET | Refills: 2 | Status: SHIPPED | OUTPATIENT
Start: 2019-10-15 | End: 2020-04-14

## 2019-10-15 NOTE — ASSESSMENT & PLAN NOTE
Acute on chronic today.  Heat/Ice alternating to area of tenderness.  Do not apply directly to skin.  Gentle stretching of area and massage.  Avoid overuse or activities that exacerbate.

## 2019-10-15 NOTE — ASSESSMENT & PLAN NOTE
Continue Protonix.  Advised to avoid known GI triggers such as spicy foods.  Upright 30 minutes after meals and avoid eating large meals.  Several small meals daily as able to avoid overfilling stomach.

## 2019-10-15 NOTE — PROGRESS NOTES
"Subjective   Jaquan Mckay is a 48 y.o. male.     Chief Complaint   Patient presents with   • Follow up [facility]       History of Present Illness     Hospital follow-up-here for hospital follow-up at [facility].  He was admitted on 9/25/19 and discharged on 10/1/2019.  He was treated for Streptococcus bacteremia after positive blood cultures were noted.  He was treated with IV antibiotics.  He did have multiple testing while he was in the hospital.  There was concern for valvular abnormalities but TTE and VIJI were negative for valvular growths.  He has had Dilantin adjustments due to elevated Dilantin level.  Chest x-ray and UA were negative for any infectious process.  He did refuse a 30-day cardiac monitor.  He was encouraged to keep his  follow-up for neurology regarding his syncope and seizure disorder  Leg concern-reports since being in the hospital he has noted an increase in pins and needle sensation in his BLE.  He reports if he sits for more than \"10 minutes\" the sensation increases.  He has noted an increase in his overall LBP.  No falls other than the syncope before his hospitalization.   He has also noted some changes in his knee pain but \"I am going to call the knee doctor.\"  Hair concern-reports he is having some continued decrease in hair growth on his head.  He has note noted changes in his beard.  He has been concerned that he is having a hormone problems.  Not at goal.    Doxepin-is concerned he is having side effects of meds due to some increased irritability.  He reports increase in vivid dreams.  He continues to take PRN BuSpar.  He reports when he takes it consistently the BuSpar does make him drowsy.  He request to have meds for sleep due to \"I have not been to bed in 2 days\".  Ongoing.        The following portions of the patient's history were reviewed and updated as appropriate: allergies, current medications, past family history, past medical history, past social history, past surgical history and " "problem list.    Review of Systems   Constitutional: Positive for fatigue. Negative for appetite change and unexpected weight change.   HENT: Negative for congestion, ear pain, nosebleeds, postnasal drip, rhinorrhea, sinus pressure, sinus pain, sore throat, trouble swallowing and voice change.    Eyes: Negative for pain and visual disturbance.   Respiratory: Positive for cough and shortness of breath. Negative for chest tightness and wheezing.    Cardiovascular: Negative for chest pain, palpitations and leg swelling.   Gastrointestinal: Positive for abdominal pain and constipation. Negative for blood in stool, diarrhea and rectal pain.   Endocrine: Negative for cold intolerance and polydipsia.   Genitourinary: Negative for difficulty urinating, dysuria, flank pain and hematuria.   Musculoskeletal: Positive for arthralgias, back pain and myalgias. Negative for gait problem and joint swelling.   Skin: Negative for color change and rash.   Allergic/Immunologic: Negative.    Neurological: Positive for headaches. Negative for dizziness, syncope and numbness.   Hematological: Negative.    Psychiatric/Behavioral: Positive for dysphoric mood and sleep disturbance. Negative for suicidal ideas. The patient is not nervous/anxious.    All other systems reviewed and are negative.      Objective     /80   Pulse 87   Temp 97 °F (36.1 °C) (Temporal)   Ht 170.2 cm (67\")   Wt 102 kg (224 lb)   SpO2 98%   BMI 35.08 kg/m²     Physical Exam   Constitutional: He is oriented to person, place, and time. He appears well-developed and well-nourished. No distress.   HENT:   Head: Normocephalic and atraumatic.   Right Ear: External ear and ear canal normal. Tympanic membrane is not erythematous.   Left Ear: External ear and ear canal normal. Tympanic membrane is not erythematous.   Nose: Mucosal edema and rhinorrhea present.   Mouth/Throat: Oropharynx is clear and moist. No oropharyngeal exudate or posterior oropharyngeal erythema. "   Eyes: Conjunctivae and EOM are normal. Pupils are equal, round, and reactive to light. No scleral icterus.   Neck: Normal range of motion. Neck supple. No JVD present. No thyromegaly present.   Cardiovascular: Normal rate, regular rhythm and normal heart sounds.   No murmur heard.  Pulmonary/Chest: Effort normal and breath sounds normal. No respiratory distress. He has no decreased breath sounds. He has no wheezes. He exhibits no tenderness.   Abdominal: Soft. Bowel sounds are normal. He exhibits no mass. There is no tenderness.   Musculoskeletal: He exhibits tenderness. He exhibits no edema.        Right elbow: Tenderness found. Medial epicondyle and olecranon process tenderness noted.        Left knee: He exhibits decreased range of motion and swelling (mild). He exhibits no ecchymosis. Tenderness found. Medial joint line and lateral joint line tenderness noted.        Lumbar back: He exhibits decreased range of motion, tenderness, pain and spasm. He exhibits no swelling.     Skin Integrity  -  His right foot skin is intact.His left foot skin is intact..  Lymphadenopathy:        Head (right side): No submandibular adenopathy present.        Head (left side): No submandibular adenopathy present.     He has no cervical adenopathy.   Neurological: He is alert and oriented to person, place, and time. He has normal reflexes. No cranial nerve deficit. He exhibits normal muscle tone. Coordination normal.   Skin: Skin is warm and dry. Capillary refill takes less than 2 seconds. He is not diaphoretic.   Varicosities noted on bilateral lower extremities.    Ecchymosis in varying degrees of color in size on BLE     Psychiatric: His speech is normal. Judgment and thought content normal. His mood appears not anxious. He is not actively hallucinating. Cognition and memory are normal. He does not exhibit a depressed mood. He expresses no homicidal and no suicidal ideation. He exhibits normal recent memory and normal remote  memory.   Laughing and talkative today.   He is attentive.   Vitals reviewed.      Assessment/Plan     Problem List Items Addressed This Visit        Digestive    Gastroesophageal reflux disease without esophagitis    Current Assessment & Plan     Continue Protonix.  Advised to avoid known GI triggers such as spicy foods.  Upright 30 minutes after meals and avoid eating large meals.  Several small meals daily as able to avoid overfilling stomach.                Nervous and Auditory    Chronic bilateral low back pain with left-sided sciatica    Current Assessment & Plan     Acute on chronic today.  Heat/Ice alternating to area of tenderness.  Do not apply directly to skin.  Gentle stretching of area and massage.  Avoid overuse or activities that exacerbate.           Relevant Medications    HYDROcodone-acetaminophen (NORCO)  MG per tablet    baclofen (LIORESAL) 10 MG tablet    Chronic elbow pain, right    Relevant Orders    Ambulatory Referral to Orthopedic Surgery       Musculoskeletal and Integument    Chondromalacia, knee    Relevant Medications    HYDROcodone-acetaminophen (NORCO)  MG per tablet    Muscle spasm of both lower legs    Relevant Medications    baclofen (LIORESAL) 10 MG tablet       Other    Generalized anxiety disorder - Primary    Current Assessment & Plan     Continue BuSpar.  If desired may take 2 tablets to see if sleep pattern is improved.             Other Visit Diagnoses     Encounter for immunization              Patient's Body mass index is 35.08 kg/m². BMI is above normal parameters. Recommendations include: nutrition counseling.   (Normal BMI:  18.5-24.9, OW 25-29.9, Obesity 30 or greater)    Understands disease processes and need for medications.  Understands reasons for urgent and emergent care.  Patient (& family) verbalized agreement for treatment plan.   Emotional support and active listening provided.  Patient provided time to verbalize feelings.    Hospital records  reviewed.  Discussed any specific concerns patient had regarding testing, labs, Etc.   Current outpatient and discharge medications have been reconciled for the patient.  Reviewed by: BALDOMERO Thao    [facility] #[id number] reviewed today and consistent.  Will refill prescribed controlled medication today.  Patient is aware they cannot receive narcotics from any other provider except if under care of pain management or speciality clinic.  Risk and benefits of medication use has been reviewed.  History and physical exam exhibit continued safe and appropriate use of controlled substances.  The patient is aware of the potential for addiction and dependence.  This patient has been made aware of the appropriate use of such medications, including potential risk of somnolence, limited ability to drive and / or work safely, and potential for overdose.    It has also been made clear that these medications are for use by this patient only, without concomitant use of alcohol or other substances unless prescribed/advised by medical provider.  Patient understands they may be subject to UDS and pill counts at random.      Patient considered to be low risk for addiction due to use of single controlled medications.  Patient understands and accepts these risks.  Patient need for medication will be reassessed at each visit.  Doses will be adjusted according to patient need and findings.    Goal of TX: Patient will not have any adverse reactions of medication.  Patient will have reduction in chronic knee and back pain with use of Norco.     Will plan flu vaccine when available.      RTC 1 month, sooner if needed.           This document has been electronically signed by:  BALDOMERO Thao, FNP-C    Dragon disclaimer:  Much of this encounter note is an electronic transcription/translation of spoken language to printed text. The electronic translation of spoken language may permit erroneous, or at times, nonsensical words or  phrases to be inadvertently transcribed; Although I have reviewed the note for such errors, some may still exist.

## 2019-11-12 ENCOUNTER — OFFICE VISIT (OUTPATIENT)
Dept: FAMILY MEDICINE CLINIC | Facility: CLINIC | Age: 49
End: 2019-11-12

## 2019-11-12 VITALS
SYSTOLIC BLOOD PRESSURE: 140 MMHG | HEIGHT: 67 IN | WEIGHT: 217 LBS | BODY MASS INDEX: 34.06 KG/M2 | OXYGEN SATURATION: 98 % | HEART RATE: 82 BPM | DIASTOLIC BLOOD PRESSURE: 88 MMHG | TEMPERATURE: 97.4 F

## 2019-11-12 DIAGNOSIS — G89.29 CHRONIC BILATERAL LOW BACK PAIN WITH LEFT-SIDED SCIATICA: ICD-10-CM

## 2019-11-12 DIAGNOSIS — J06.9 ACUTE URI: ICD-10-CM

## 2019-11-12 DIAGNOSIS — M54.42 CHRONIC BILATERAL LOW BACK PAIN WITH LEFT-SIDED SCIATICA: ICD-10-CM

## 2019-11-12 DIAGNOSIS — R23.8 SCALP IRRITATION: ICD-10-CM

## 2019-11-12 DIAGNOSIS — F51.01 PRIMARY INSOMNIA: ICD-10-CM

## 2019-11-12 DIAGNOSIS — M94.262 CHONDROMALACIA OF LEFT KNEE: Primary | ICD-10-CM

## 2019-11-12 PROCEDURE — 99214 OFFICE O/P EST MOD 30 MIN: CPT | Performed by: NURSE PRACTITIONER

## 2019-11-12 RX ORDER — CLINDAMYCIN HYDROCHLORIDE 300 MG/1
300 CAPSULE ORAL 3 TIMES DAILY
Qty: 30 CAPSULE | Refills: 0 | Status: SHIPPED | OUTPATIENT
Start: 2019-11-12 | End: 2019-11-22

## 2019-11-12 RX ORDER — HYDROCODONE BITARTRATE AND ACETAMINOPHEN 10; 325 MG/1; MG/1
1 TABLET ORAL 2 TIMES DAILY PRN
Qty: 60 TABLET | Refills: 0 | Status: SHIPPED | OUTPATIENT
Start: 2019-11-12 | End: 2019-12-13 | Stop reason: SDUPTHER

## 2019-11-12 RX ORDER — MIRTAZAPINE 7.5 MG/1
7.5 TABLET, FILM COATED ORAL NIGHTLY
Qty: 30 TABLET | Refills: 0 | Status: SHIPPED | OUTPATIENT
Start: 2019-11-12 | End: 2019-12-13 | Stop reason: DRUGHIGH

## 2019-11-12 RX ORDER — DEXTROMETHORPHAN HYDROBROMIDE AND PROMETHAZINE HYDROCHLORIDE 15; 6.25 MG/5ML; MG/5ML
5 SYRUP ORAL 4 TIMES DAILY PRN
Qty: 180 ML | Refills: 0 | Status: SHIPPED | OUTPATIENT
Start: 2019-11-12 | End: 2019-12-13

## 2019-11-12 RX ORDER — KETOCONAZOLE 20 MG/ML
SHAMPOO TOPICAL 2 TIMES WEEKLY
Qty: 120 ML | Refills: 5 | Status: SHIPPED | OUTPATIENT
Start: 2019-11-14 | End: 2020-04-20 | Stop reason: ALTCHOICE

## 2019-11-12 NOTE — PROGRESS NOTES
"Subjective   Jaquan Mckay is a 48 y.o. male.     Chief Complaint   Patient presents with   • Anxiety   • Heartburn       History of Present Illness     Sinus complaint-sore throat and PND is present.  Has been present for \"bout a week\".  Cough is present but not productive.  Feels like \"stuff is down in my chest\".  Has been using a humidifier at home.  Sinus HA is present with burning sensation in his face.  Some bloody discharge from his nose.  He reports he has noted a sore in his nose.    Insomnia-reports he has been taking 2 tablets of Buspar at night but it has not helping.  He reports he is sleeping \"2-3 hours\" of sleep.  He reports \"dont go to bed till 3 or 6 in the AM\".  He is occasionally able to fall back to sleep.   GI-reports he missed his GI appt.  He reports it was \"October\".  He is uncertain of the date.  No new GI concerns.   Hair complaint-reports \"is not growing\".  He reports he is not noting much loss.  He has been using stimulating products.    Knee pain-reports he has made a follow up appt with orthopedic.  He is going back to see ortho at [facility].  He has had several falls.  He reports he hit his head last week when he fell.     The following portions of the patient's history were reviewed and updated as appropriate: CC, ROS, allergies, current medications, past family history, past medical history, past social history, past surgical history and problem list.    Review of Systems   Constitutional: Positive for fatigue. Negative for appetite change and unexpected weight change.   HENT: Positive for congestion, ear pain, nosebleeds (single episode \"last week\"), rhinorrhea, sinus pressure, sneezing and sore throat. Negative for postnasal drip, sinus pain, trouble swallowing and voice change.    Eyes: Negative for pain and visual disturbance.   Respiratory: Positive for cough and shortness of breath. Negative for chest tightness and wheezing.    Cardiovascular: Negative for chest pain, " "palpitations and leg swelling.   Gastrointestinal: Positive for abdominal pain and constipation. Negative for blood in stool, diarrhea and rectal pain.   Endocrine: Negative for cold intolerance and polydipsia.   Genitourinary: Negative for difficulty urinating, dysuria, flank pain and hematuria.   Musculoskeletal: Positive for arthralgias, back pain and myalgias. Negative for gait problem and joint swelling.   Skin: Negative for color change and rash.   Allergic/Immunologic: Negative.    Neurological: Positive for headaches. Negative for dizziness, syncope and numbness.   Hematological: Negative.    Psychiatric/Behavioral: Positive for dysphoric mood and sleep disturbance. Negative for suicidal ideas. The patient is not nervous/anxious.    All other systems reviewed and are negative.      Objective     /88   Pulse 82   Temp 97.4 °F (36.3 °C) (Temporal)   Ht 170.2 cm (67\")   Wt 98.4 kg (217 lb)   SpO2 98%   BMI 33.99 kg/m²     Physical Exam   Constitutional: He is oriented to person, place, and time. He appears well-developed and well-nourished. No distress.   HENT:   Head: Normocephalic and atraumatic.   Right Ear: External ear and ear canal normal. Tympanic membrane is erythematous. A middle ear effusion is present.   Left Ear: External ear and ear canal normal. Tympanic membrane is erythematous. A middle ear effusion is present.   Nose: Mucosal edema and rhinorrhea present. Right sinus exhibits maxillary sinus tenderness and frontal sinus tenderness. Left sinus exhibits maxillary sinus tenderness and frontal sinus tenderness.   Mouth/Throat: Oropharyngeal exudate and posterior oropharyngeal erythema present.   Eyes: Conjunctivae and EOM are normal. Pupils are equal, round, and reactive to light. No scleral icterus.   Neck: Normal range of motion. Neck supple. No JVD present. No thyromegaly present.   Cardiovascular: Normal rate, regular rhythm and normal heart sounds.   No murmur " heard.  Pulmonary/Chest: Effort normal and breath sounds normal. No respiratory distress. He has no decreased breath sounds. He has no wheezes. He exhibits no tenderness.   Abdominal: Soft. Bowel sounds are normal. He exhibits no mass. There is no tenderness.   Musculoskeletal: He exhibits tenderness. He exhibits no edema.        Right elbow: Tenderness found. Medial epicondyle and olecranon process tenderness noted.        Left knee: He exhibits decreased range of motion and swelling (mild). He exhibits no ecchymosis. Tenderness found. Medial joint line and lateral joint line tenderness noted.        Lumbar back: He exhibits decreased range of motion, tenderness, pain and spasm. He exhibits no swelling.     Skin Integrity  -  His right foot skin is intact.His left foot skin is intact..  Lymphadenopathy:        Head (right side): Submandibular adenopathy present.        Head (left side): Submandibular adenopathy present.     He has cervical adenopathy.        Right cervical: Superficial cervical adenopathy present.        Left cervical: Superficial cervical adenopathy present.   Neurological: He is alert and oriented to person, place, and time. He has normal reflexes. No cranial nerve deficit. He exhibits normal muscle tone. Coordination normal.   Skin: Skin is warm and dry. Capillary refill takes less than 2 seconds. He is not diaphoretic.   Varicosities noted on bilateral lower extremities.    Ecchymosis in varying degrees of color in size on BLE  No obvious bald patches.  Patient does shave the sides and back of his head.  Hair on top of head approximately half inch long.  Mild folliculitis noted from shaving   Psychiatric: His speech is normal. Judgment and thought content normal. His mood appears not anxious. He is not actively hallucinating. Cognition and memory are normal. He does not exhibit a depressed mood. He expresses no homicidal and no suicidal ideation. He exhibits normal recent memory and normal  remote memory.   Laughing and talkative today.   He is attentive.   Vitals reviewed.      Assessment/Plan     Problem List Items Addressed This Visit        Nervous and Auditory    Chronic bilateral low back pain with left-sided sciatica    Relevant Medications    HYDROcodone-acetaminophen (NORCO)  MG per tablet       Musculoskeletal and Integument    Chondromalacia, knee - Primary    Current Assessment & Plan     Continue under care of Orthopedic for care         Relevant Medications    HYDROcodone-acetaminophen (NORCO)  MG per tablet      Other Visit Diagnoses     Primary insomnia        Relevant Medications    mirtazapine (REMERON) 7.5 MG tablet    Scalp irritation        Relevant Medications    ketoconazole (NIZORAL) 2 % shampoo    Acute URI        Relevant Medications    promethazine-dextromethorphan (PROMETHAZINE-DM) 6.25-15 MG/5ML syrup    clindamycin (CLEOCIN) 300 MG capsule        Patient's Body mass index is 33.99 kg/m². BMI is above normal parameters. Recommendations include: nutrition counseling.    Emotional support and active listening provided.  Patient provided time to verbalize feelings.  Understands disease processes and need for medications.  Understands reasons for urgent and emergent care.  Patient (& family) verbalized agreement for treatment plan.     [facility] #[id number] reviewed today and consistent.  Will refill prescribed controlled medication today.  Patient is aware they cannot receive narcotics from any other provider except if under care of pain management or speciality clinic.  Risk and benefits of medication use has been reviewed.  History and physical exam exhibit continued safe and appropriate use of controlled substances.  The patient is aware of the potential for addiction and dependence.  This patient has been made aware of the appropriate use of such medications, including potential risk of somnolence, limited ability to drive and / or work safely, and potential for  overdose.    It has also been made clear that these medications are for use by this patient only, without concomitant use of alcohol or other substances unless prescribed/advised by medical provider.  Patient understands they may be subject to UDS and pill counts at random.      Patient considered to be low risk for addiction due to use of single controlled medications.  Patient understands and accepts these risks.  Patient need for medication will be reassessed at each visit.  Doses will be adjusted according to patient need and findings.    Goal of TX: Patient will not have any adverse reactions of medication.   Patient will have reduction in chronic joint pain symptoms with use of Norco as directed.    Discussed clarithromycin and patient's multiple allergies.  Patient understands to be aware of any new rash.  Patient reports he has a updated EpiPen for use for any possible anaphylaxis.    Appt date and time given for GI appt:  December 4 @ 9:30 AM.      RTC 1 month, sooner if needed.           This document has been electronically signed by:  BALDOMERO Thao FNP-C Dragon disclaimer:  Much of this encounter note is an electronic transcription/translation of spoken language to printed text. The electronic translation of spoken language may permit erroneous, or at times, nonsensical words or phrases to be inadvertently transcribed; Although I have reviewed the note for such errors, some may still exist.

## 2019-11-15 DIAGNOSIS — M25.521 ELBOW PAIN, RIGHT: Primary | ICD-10-CM

## 2019-11-18 ENCOUNTER — DOCUMENTATION (OUTPATIENT)
Dept: FAMILY MEDICINE CLINIC | Facility: CLINIC | Age: 49
End: 2019-11-18

## 2019-11-19 ENCOUNTER — TELEPHONE (OUTPATIENT)
Dept: FAMILY MEDICINE CLINIC | Facility: CLINIC | Age: 49
End: 2019-11-19

## 2019-11-19 NOTE — TELEPHONE ENCOUNTER
----- Message from BALDOMERO Thao sent at 11/18/2019  6:25 PM EST -----  printed  ----- Message -----  From: Lorna Morillo MA  Sent: 11/14/2019   2:05 PM  To: BALDOMERO Thao    Patient came by the office and wanted to know if you could type him out a letter stating that he needs a lower level apt with no stairs. Also he needs the letter to state that he needs bars around his bathtub and around his toilet. This is due to his knee problems.

## 2019-11-20 ENCOUNTER — HOSPITAL ENCOUNTER (OUTPATIENT)
Dept: GENERAL RADIOLOGY | Facility: HOSPITAL | Age: 49
Discharge: HOME | End: 2019-11-20
Admitting: ORTHOPAEDIC SURGERY

## 2019-11-20 ENCOUNTER — OFFICE VISIT (OUTPATIENT)
Dept: ORTHOPEDIC SURGERY | Facility: CLINIC | Age: 49
End: 2019-11-20

## 2019-11-20 VITALS
DIASTOLIC BLOOD PRESSURE: 81 MMHG | HEIGHT: 67 IN | WEIGHT: 217 LBS | SYSTOLIC BLOOD PRESSURE: 137 MMHG | HEART RATE: 91 BPM | BODY MASS INDEX: 34.06 KG/M2

## 2019-11-20 DIAGNOSIS — M77.11 LATERAL EPICONDYLITIS, RIGHT ELBOW: Primary | ICD-10-CM

## 2019-11-20 DIAGNOSIS — M25.521 ELBOW PAIN, RIGHT: ICD-10-CM

## 2019-11-20 PROCEDURE — 20605 DRAIN/INJ JOINT/BURSA W/O US: CPT | Performed by: ORTHOPAEDIC SURGERY

## 2019-11-20 PROCEDURE — 99213 OFFICE O/P EST LOW 20 MIN: CPT | Performed by: ORTHOPAEDIC SURGERY

## 2019-11-20 PROCEDURE — 73070 X-RAY EXAM OF ELBOW: CPT

## 2019-11-20 PROCEDURE — 73070 X-RAY EXAM OF ELBOW: CPT | Performed by: RADIOLOGY

## 2019-11-20 RX ADMIN — METHYLPREDNISOLONE ACETATE 80 MG: 40 INJECTION, SUSPENSION INTRA-ARTICULAR; INTRALESIONAL; INTRAMUSCULAR; SOFT TISSUE at 17:19

## 2019-11-20 RX ADMIN — LIDOCAINE HYDROCHLORIDE 5 ML: 20 INJECTION, SOLUTION INFILTRATION; PERINEURAL at 17:19

## 2019-11-20 NOTE — PROGRESS NOTES
Follow-up Visit         Patient: Jaquan Mckay  YOB: 1970  Date of Encounter: 11/20/2019      Chief  Complaint:   Chief Complaint   Patient presents with   • Right Elbow - Pain, Initial Evaluation, Edema         HPI:  Jaquan Mckay, 48 y.o. male resents with pain in his right elbow he describes it is electrical pain radiating from his arm distally down his forearm.  He reports difficulty fully extending his elbow pain is worse in the morning he has difficulty rotating his arm.  He does acknowledge fracture of his right elbow at age 7 but he fully recovered from this.  He acknowledges occasional tingling in his second third and fourth fingers.  He tells me that he is aware that he has carpal tunnel.  He has had right elbow pain for approximately 1 year.  He reports that he has had multiple falls.    Medical History:  Patient Active Problem List   Diagnosis   • Gastroesophageal reflux disease without esophagitis   • Arthritis   • Hypertension   • Seizure disorder (CMS/HCC)   • Hyperlipidemia   • Anxiety   • Varicocele   • Varicocele present on ultrasound of scrotum   • Obesity (BMI 30.0-34.9)   • Chronic bilateral low back pain with left-sided sciatica   • Seasonal allergic rhinitis due to pollen   • Mild persistent asthma with acute exacerbation   • Precordial pain   • Dysuria   • Congenital coronary artery anomaly   • BMI 35.0-35.9,adult   • Chondromalacia, knee   • Achilles tendinitis of both lower extremities   • Muscle spasm of both lower legs   • Personal history of allergy to shellfish   • Sensation of cold in lower extremity   • Varicose vein of leg   • Heart palpitations   • Shortness of breath   • Generalized anxiety disorder   • Chronic elbow pain, right   • Chest pain   • Unstable angina (CMS/HCC)   • ASCVD (arteriosclerotic cardiovascular disease)   • Elevated prolactin level (CMS/HCC)   • Other constipation   • Class 1 obesity due to excess calories with serious comorbidity and body  "mass index (BMI) of 33.0 to 33.9 in adult   • Bacteremia     Past Medical History:   Diagnosis Date   • Allergic    • Anxiety    • Arthritis    • Asthma    • Body piercing     REPORTS CYLICONE IN EARS   • Clotting disorder (CMS/[facility]) 2004    had a knee surgery   • Depression    • DVT (deep venous thrombosis) (CMS/[facility])     RIGHT RIGHT KNEE AFTER SURGERY YEARS AGO IN 2001 OR 2004   • Gastric ulcer    • GERD (gastroesophageal reflux disease)    • H/O migraine    • H/O sleep apnea     REPORTS DIAGNOSED WITH SLEEP APNEA AND COULDN'T STAND TO WEAR THE MACHINE   • Headache    • Heart attack (CMS/[facility])     REPORTS \"LIGHT HEART ATTACK A LONG TIME AGO\"  \"EARLY 90'S\"   • History of seizures     REPORTS LAST EPISODE WAS AROUND 1995.   • Hostility    • Hyperlipidemia    • Hypertension    • Knee pain, acute     Left   • Low back pain    • Migraine    • MRSA (methicillin resistant Staphylococcus aureus)     REPORTS LAST TESTED + 2004. WAS TREATED HE REPORTS.  RIGHT ARM, RIGHT KNEE.   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    • Obesity    • Poor historian    • Carl [place] spotted fever    • Seizures (CMS/[facility])    • Tattoo    • Wears glasses        Social History:  Social History     Socioeconomic History   • Marital status:      Spouse name: Becca   • Number of children: 2   • Years of education: 12   • Highest education level: Not on file   Occupational History   • Occupation: DISABLED   Social Needs   • Financial resource strain: Somewhat hard   • Food insecurity:     Worry: Sometimes true     Inability: Sometimes true   • Transportation needs:     Medical: No     Non-medical: No   Tobacco Use   • Smoking status: Current Every Day Smoker     Packs/day: 2.00     Years: 17.00     Pack years: 34.00     Types: Cigars, Cigarettes     Start date: 5/5/2010   • Smokeless tobacco: Never Used   Substance and Sexual Activity   • Alcohol use: No   • Drug use: No   • Sexual activity: Defer   Lifestyle   • Physical activity:     Days per week: 0 days "     Minutes per session: 0 min   • Stress: To some extent   Relationships   • Social connections:     Talks on phone: Once a week     Gets together: Once a week     Attends Church service: Never     Active member of club or organization: No     Attends meetings of clubs or organizations: Never     Relationship status:        Surgical History:  Past Surgical History:   Procedure Laterality Date   • BACK SURGERY     • BRAIN SURGERY  1986    Tumor removal    • CARDIAC CATHETERIZATION N/A 9/28/2018    Procedure: Left Heart Cath;  Surgeon: Leandro Daily MD;  Location:  [facility];  Service: Cardiology   • CHOLECYSTECTOMY     • CYST REMOVAL     • KNEE ARTHROSCOPY Left 10/20/2017    Procedure: Diagnostic arthroscopy left knee with chondroplasty;  Surgeon: Marco Aguirre MD;  Location:  [facility];  Service:    • KNEE SURGERY Right    • MOUTH SURGERY      FULL MOUTH EXTRACTION   • OTHER SURGICAL HISTORY      REPORTS 7 TICKS REMOVED FROM RIGHT ARM IN 2001 OR 2002   • TUMOR EXCISION      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      Examination:   Right elbow examination feels full mobility without pain.  He has exquisite tenderness upon palpation of the lateral epicondyle and significant pain with resisted wrist extension.  Neurovascular examination is grossly intact.    Assessment & Plan:   48 y.o. male presents with clinical findings consistent with lateral epicondylitis right elbow.  We discussed his options today.  He was then provided steroid injection Depo-Medrol 40 mg with lidocaine block directly over lateral epicondyle.  He will follow-up in the future as needed depending on his response to steroid injection.         Diagnosis Plan   1. Elbow pain, right     2. Lateral epicondylitis, right elbow     Medium Joint Arthrocentesis: R olecranon bursa  Date/Time: 11/20/2019 5:19 PM  Consent given by:  patient  Site marked: site marked  Timeout: Immediately prior to procedure a time out was called to verify the correct patient, procedure, equipment, support staff and site/side marked as required   Supporting Documentation  Indications: pain   Procedure Details  Location: elbow - R olecranon bursa  Preparation: Patient was prepped and draped in the usual sterile fashion  Needle size: 25 G  Approach: anterolateral  Medications administered: 5 mL lidocaine 2%; 80 mg methylPREDNISolone acetate 40 MG/ML  Patient tolerance: patient tolerated the procedure well with no immediate complications                  Cc:  Tiera Valenzuela APRN              This document has been electronically signed by Jose Ruiz MD   November 20, 2019 5:18 PM

## 2019-11-22 RX ORDER — METHYLPREDNISOLONE ACETATE 80 MG/ML
160 INJECTION, SUSPENSION INTRA-ARTICULAR; INTRALESIONAL; INTRAMUSCULAR; SOFT TISSUE
Status: DISCONTINUED | OUTPATIENT
Start: 2019-11-20 | End: 2019-12-06

## 2019-11-22 RX ORDER — LIDOCAINE HYDROCHLORIDE 20 MG/ML
5 INJECTION, SOLUTION INFILTRATION; PERINEURAL
Status: COMPLETED | OUTPATIENT
Start: 2019-11-20 | End: 2019-11-20

## 2019-11-27 ENCOUNTER — APPOINTMENT (OUTPATIENT)
Dept: GENERAL RADIOLOGY | Facility: HOSPITAL | Age: 49
End: 2019-11-27

## 2019-11-27 ENCOUNTER — HOSPITAL ENCOUNTER (EMERGENCY)
Facility: HOSPITAL | Age: 49
Discharge: HOME | End: 2019-11-28
Attending: FAMILY MEDICINE | Admitting: FAMILY MEDICINE

## 2019-11-27 DIAGNOSIS — R07.89 ATYPICAL CHEST PAIN: Primary | ICD-10-CM

## 2019-11-27 LAB
ALBUMIN SERPL-MCNC: 4.69 G/DL (ref 3.5–5.2)
ALBUMIN/GLOB SERPL: 1.3 G/DL
ALP SERPL-CCNC: 90 U/L (ref 39–117)
ALT SERPL W P-5'-P-CCNC: 21 U/L (ref 1–41)
ANION GAP SERPL CALCULATED.3IONS-SCNC: 14.4 MMOL/L (ref 5–15)
AST SERPL-CCNC: 25 U/L (ref 1–40)
BASOPHILS # BLD AUTO: 0.03 10*3/MM3 (ref 0–0.2)
BASOPHILS NFR BLD AUTO: 0.3 % (ref 0–1.5)
BILIRUB SERPL-MCNC: 0.4 MG/DL (ref 0.2–1.2)
BILIRUB UR QL STRIP: NEGATIVE
BUN BLD-MCNC: 10 MG/DL (ref 6–20)
BUN/CREAT SERPL: 9.8 (ref 7–25)
CALCIUM SPEC-SCNC: 9.6 MG/DL (ref 8.6–10.5)
CHLORIDE SERPL-SCNC: 100 MMOL/L (ref 98–107)
CLARITY UR: CLEAR
CO2 SERPL-SCNC: 24.6 MMOL/L (ref 22–29)
COLOR UR: YELLOW
CREAT BLD-MCNC: 1.02 MG/DL (ref 0.76–1.27)
DEPRECATED RDW RBC AUTO: 42.5 FL (ref 37–54)
EOSINOPHIL # BLD AUTO: 0.07 10*3/MM3 (ref 0–0.4)
EOSINOPHIL NFR BLD AUTO: 0.7 % (ref 0.3–6.2)
ERYTHROCYTE [DISTWIDTH] IN BLOOD BY AUTOMATED COUNT: 12.6 % (ref 12.3–15.4)
ETHANOL BLD-MCNC: <10 MG/DL (ref 0–10)
ETHANOL UR QL: <0.01 %
GFR SERPL CREATININE-BSD FRML MDRD: 79 ML/MIN/1.73
GLOBULIN UR ELPH-MCNC: 3.7 GM/DL
GLUCOSE BLD-MCNC: 105 MG/DL (ref 65–99)
GLUCOSE UR STRIP-MCNC: NEGATIVE MG/DL
HCT VFR BLD AUTO: 45.1 % (ref 37.5–51)
HGB BLD-MCNC: 16.2 G/DL (ref 13–17.7)
HGB UR QL STRIP.AUTO: NEGATIVE
IMM GRANULOCYTES # BLD AUTO: 0.03 10*3/MM3 (ref 0–0.05)
IMM GRANULOCYTES NFR BLD AUTO: 0.3 % (ref 0–0.5)
KETONES UR QL STRIP: NEGATIVE
LEUKOCYTE ESTERASE UR QL STRIP.AUTO: NEGATIVE
LYMPHOCYTES # BLD AUTO: 2.6 10*3/MM3 (ref 0.7–3.1)
LYMPHOCYTES NFR BLD AUTO: 27.3 % (ref 19.6–45.3)
MAGNESIUM SERPL-MCNC: 1.9 MG/DL (ref 1.6–2.6)
MCH RBC QN AUTO: 33.1 PG (ref 26.6–33)
MCHC RBC AUTO-ENTMCNC: 35.9 G/DL (ref 31.5–35.7)
MCV RBC AUTO: 92 FL (ref 79–97)
MONOCYTES # BLD AUTO: 1.05 10*3/MM3 (ref 0.1–0.9)
MONOCYTES NFR BLD AUTO: 11 % (ref 5–12)
NEUTROPHILS # BLD AUTO: 5.76 10*3/MM3 (ref 1.7–7)
NEUTROPHILS NFR BLD AUTO: 60.4 % (ref 42.7–76)
NITRITE UR QL STRIP: NEGATIVE
NRBC BLD AUTO-RTO: 0 /100 WBC (ref 0–0.2)
NT-PROBNP SERPL-MCNC: 17.6 PG/ML (ref 5–450)
PH UR STRIP.AUTO: 6 [PH] (ref 5–8)
PLATELET # BLD AUTO: 225 10*3/MM3 (ref 140–450)
PMV BLD AUTO: 10.2 FL (ref 6–12)
POTASSIUM BLD-SCNC: 3.7 MMOL/L (ref 3.5–5.2)
PROT SERPL-MCNC: 8.4 G/DL (ref 6–8.5)
PROT UR QL STRIP: NEGATIVE
RBC # BLD AUTO: 4.9 10*6/MM3 (ref 4.14–5.8)
SODIUM BLD-SCNC: 139 MMOL/L (ref 136–145)
SP GR UR STRIP: 1.01 (ref 1–1.03)
TROPONIN T SERPL-MCNC: <0.01 NG/ML (ref 0–0.03)
TSH SERPL DL<=0.05 MIU/L-ACNC: 3.26 UIU/ML (ref 0.27–4.2)
UROBILINOGEN UR QL STRIP: NORMAL
WBC NRBC COR # BLD: 9.54 10*3/MM3 (ref 3.4–10.8)

## 2019-11-27 PROCEDURE — 80307 DRUG TEST PRSMV CHEM ANLYZR: CPT | Performed by: FAMILY MEDICINE

## 2019-11-27 PROCEDURE — 93005 ELECTROCARDIOGRAM TRACING: CPT | Performed by: FAMILY MEDICINE

## 2019-11-27 PROCEDURE — 96374 THER/PROPH/DIAG INJ IV PUSH: CPT

## 2019-11-27 PROCEDURE — 80050 GENERAL HEALTH PANEL: CPT | Performed by: FAMILY MEDICINE

## 2019-11-27 PROCEDURE — 84484 ASSAY OF TROPONIN QUANT: CPT | Performed by: FAMILY MEDICINE

## 2019-11-27 PROCEDURE — 25010000002 MORPHINE PER 10 MG: Performed by: FAMILY MEDICINE

## 2019-11-27 PROCEDURE — 83735 ASSAY OF MAGNESIUM: CPT | Performed by: FAMILY MEDICINE

## 2019-11-27 PROCEDURE — 93010 ELECTROCARDIOGRAM REPORT: CPT | Performed by: INTERNAL MEDICINE

## 2019-11-27 PROCEDURE — 83880 ASSAY OF NATRIURETIC PEPTIDE: CPT | Performed by: FAMILY MEDICINE

## 2019-11-27 PROCEDURE — 99284 EMERGENCY DEPT VISIT MOD MDM: CPT

## 2019-11-27 PROCEDURE — 71045 X-RAY EXAM CHEST 1 VIEW: CPT

## 2019-11-27 PROCEDURE — 36415 COLL VENOUS BLD VENIPUNCTURE: CPT

## 2019-11-27 PROCEDURE — 81003 URINALYSIS AUTO W/O SCOPE: CPT | Performed by: FAMILY MEDICINE

## 2019-11-27 RX ADMIN — MORPHINE SULFATE 4 MG: 4 INJECTION, SOLUTION INTRAMUSCULAR; INTRAVENOUS at 22:52

## 2019-11-28 VITALS
BODY MASS INDEX: 33.43 KG/M2 | HEIGHT: 67 IN | RESPIRATION RATE: 18 BRPM | DIASTOLIC BLOOD PRESSURE: 85 MMHG | HEART RATE: 77 BPM | SYSTOLIC BLOOD PRESSURE: 141 MMHG | TEMPERATURE: 97.9 F | WEIGHT: 213 LBS | OXYGEN SATURATION: 98 %

## 2019-11-28 LAB
6-ACETYL MORPHINE: NEGATIVE
AMPHET+METHAMPHET UR QL: NEGATIVE
BARBITURATES UR QL SCN: NEGATIVE
BENZODIAZ UR QL SCN: NEGATIVE
BUPRENORPHINE SERPL-MCNC: NEGATIVE NG/ML
CANNABINOIDS SERPL QL: NEGATIVE
COCAINE UR QL: NEGATIVE
METHADONE UR QL SCN: NEGATIVE
OPIATES UR QL: POSITIVE
OXYCODONE UR QL SCN: NEGATIVE
PCP UR QL SCN: NEGATIVE
TROPONIN T SERPL-MCNC: <0.01 NG/ML (ref 0–0.03)

## 2019-11-28 PROCEDURE — 84484 ASSAY OF TROPONIN QUANT: CPT | Performed by: FAMILY MEDICINE

## 2019-12-05 RX ORDER — PHENYTOIN SODIUM 200 MG/1
200 CAPSULE, EXTENDED RELEASE ORAL EVERY 12 HOURS SCHEDULED
Qty: 60 CAPSULE | Refills: 0 | Status: SHIPPED | OUTPATIENT
Start: 2019-12-05 | End: 2019-12-05 | Stop reason: SDUPTHER

## 2019-12-05 RX ORDER — PHENYTOIN SODIUM 200 MG/1
200 CAPSULE, EXTENDED RELEASE ORAL EVERY 12 HOURS SCHEDULED
Qty: 60 CAPSULE | Refills: 0 | Status: SHIPPED | OUTPATIENT
Start: 2019-12-05 | End: 2020-04-14

## 2019-12-06 RX ORDER — METHYLPREDNISOLONE ACETATE 40 MG/ML
80 INJECTION, SUSPENSION INTRA-ARTICULAR; INTRALESIONAL; INTRAMUSCULAR; SOFT TISSUE
Status: COMPLETED | OUTPATIENT
Start: 2019-11-20 | End: 2019-11-20

## 2019-12-13 ENCOUNTER — OFFICE VISIT (OUTPATIENT)
Dept: FAMILY MEDICINE CLINIC | Facility: CLINIC | Age: 49
End: 2019-12-13

## 2019-12-13 VITALS
HEIGHT: 67 IN | WEIGHT: 218 LBS | DIASTOLIC BLOOD PRESSURE: 82 MMHG | HEART RATE: 94 BPM | BODY MASS INDEX: 34.21 KG/M2 | TEMPERATURE: 97.2 F | SYSTOLIC BLOOD PRESSURE: 130 MMHG | OXYGEN SATURATION: 98 %

## 2019-12-13 DIAGNOSIS — R60.0 BILATERAL LEG EDEMA: ICD-10-CM

## 2019-12-13 DIAGNOSIS — G89.29 CHRONIC BILATERAL LOW BACK PAIN WITH LEFT-SIDED SCIATICA: ICD-10-CM

## 2019-12-13 DIAGNOSIS — M94.262 CHONDROMALACIA OF LEFT KNEE: ICD-10-CM

## 2019-12-13 DIAGNOSIS — J06.9 ACUTE URI: ICD-10-CM

## 2019-12-13 DIAGNOSIS — M62.838 MUSCLE SPASM OF BOTH LOWER LEGS: Primary | ICD-10-CM

## 2019-12-13 DIAGNOSIS — F41.1 GENERALIZED ANXIETY DISORDER: ICD-10-CM

## 2019-12-13 DIAGNOSIS — M54.42 CHRONIC BILATERAL LOW BACK PAIN WITH LEFT-SIDED SCIATICA: ICD-10-CM

## 2019-12-13 PROCEDURE — 99215 OFFICE O/P EST HI 40 MIN: CPT | Performed by: NURSE PRACTITIONER

## 2019-12-13 RX ORDER — AZITHROMYCIN 250 MG/1
TABLET, FILM COATED ORAL
Qty: 6 TABLET | Refills: 0 | Status: SHIPPED | OUTPATIENT
Start: 2019-12-13 | End: 2020-01-13

## 2019-12-13 RX ORDER — MIRTAZAPINE 30 MG/1
15-30 TABLET, FILM COATED ORAL NIGHTLY
Qty: 30 TABLET | Refills: 1 | Status: SHIPPED | OUTPATIENT
Start: 2019-12-13 | End: 2020-02-13

## 2019-12-13 RX ORDER — HYDROCODONE BITARTRATE AND ACETAMINOPHEN 10; 325 MG/1; MG/1
1 TABLET ORAL 2 TIMES DAILY PRN
Qty: 60 TABLET | Refills: 0 | Status: SHIPPED | OUTPATIENT
Start: 2019-12-13 | End: 2019-12-30 | Stop reason: SDUPTHER

## 2019-12-13 NOTE — PROGRESS NOTES
"Subjective   Jaquan Mckay is a 49 y.o. male.     Chief Complaint   Patient presents with   • Anxiety   • Knee Pain   • Fall       History of Present Illness     HA-x's 3 days.  He reports sinus pressure and pain.  Some ear fullness.  Generalized cough.  He reports he was unable to finish ordered clindamycin due to a rash \"all over his body\".  He reports his symptoms have been similar to his last visit.\"  Not gotten any better\".  Fall at home-reports \"last week\".  He reports \"I was an hour\" before he could get up.  Continues to have difficulty with his knee that he had surgery on.  He reports \"gives out\".  He has been back to see orthopedic regarding his knee and was advised that he could have cortisone injections which patient has adamantly refused.  GI follow up-he reports he has dropped off a stool sample at Dr Shah for a  Stool study.  He reports he continues to have some diarrhea \"every since I was in the hospital.\"  He reports he has not ever seen the DR and \"I am not going back\".  He request referral to someone else for a \"scope\" but \"I want to see a Doctor\".  Not at goal.   Orthopedic follow-up for elbow pain-reports he has been advised he needs surgery on his elbow.  He reports he has had 2 cortisone injections that \"hurt me\".  \"I could not move for 3 weeks\".  He is in a elbow splint today.  He reports he will \"be going back after the holiday\".    Low back pain-he reports he has noted an increase in his overall low back pain.  He does feel that each time he follows his back pain seems to increase.  He reports sensation changes in his legs.  Reports \"my back has gotten a lot worse\".  He does continue to try to go to the gym and be active as he is physically able.  Rash-after Clindamycin use.  Reports rash on face and torso.  He used benadryl and symptoms resolved.    Leg numbness and sensation change-with burning sensation.  Reports \"heat comes off\".  He has had increase in cramps.  Occurs with walking but " "also at rest.  He reports he is having some swelling.  \"they hurt\".  Redness and burning \"comes and goes\".  Reports pain \"is like my muscles are getting cut out\".  He has also noted some hair loss of his legs but had a negative arterial doppler earlier this year.    Insomnia-reports he has tolerated Remeron but it does not help him sleep.  He continues to have scattered pattern of insomnia with multiple times awakening.  He did not have any increase in his anxiety or depressive symptoms.  Ongoing    The following portions of the patient's history were reviewed and updated as appropriate: CC, ROS, allergies, current medications, past family history, past medical history, past social history, past surgical history and problem list.    Review of Systems   Constitutional: Positive for fatigue. Negative for appetite change and unexpected weight change.   HENT: Positive for congestion, rhinorrhea and sinus pressure. Negative for ear pain, postnasal drip, sinus pain, sore throat, trouble swallowing and voice change.    Eyes: Negative for pain and visual disturbance.   Respiratory: Positive for cough and shortness of breath. Negative for chest tightness and wheezing.    Cardiovascular: Negative for chest pain, palpitations and leg swelling.   Gastrointestinal: Positive for abdominal pain and constipation. Negative for blood in stool, diarrhea and rectal pain.   Endocrine: Negative for cold intolerance and polydipsia.   Genitourinary: Negative for difficulty urinating, dysuria, flank pain and hematuria.   Musculoskeletal: Positive for arthralgias, back pain and myalgias. Negative for gait problem and joint swelling.   Skin: Negative for color change and rash.   Allergic/Immunologic: Negative.    Neurological: Positive for headaches. Negative for dizziness, syncope and numbness.   Hematological: Negative.    Psychiatric/Behavioral: Positive for dysphoric mood and sleep disturbance. Negative for suicidal ideas. The patient is " "not nervous/anxious.    All other systems reviewed and are negative.      Objective     /82   Pulse 94   Temp 97.2 °F (36.2 °C) (Temporal)   Ht 170.2 cm (67\")   Wt 98.9 kg (218 lb)   SpO2 98%   BMI 34.14 kg/m²     Physical Exam   Constitutional: He is oriented to person, place, and time. He appears well-developed and well-nourished. No distress.   HENT:   Head: Normocephalic and atraumatic.   Right Ear: External ear and ear canal normal. Tympanic membrane is erythematous. A middle ear effusion is present.   Left Ear: External ear and ear canal normal. Tympanic membrane is erythematous. A middle ear effusion is present.   Nose: Mucosal edema and rhinorrhea present. Right sinus exhibits maxillary sinus tenderness and frontal sinus tenderness. Left sinus exhibits maxillary sinus tenderness and frontal sinus tenderness.   Mouth/Throat: Oropharyngeal exudate and posterior oropharyngeal erythema present.   Eyes: Pupils are equal, round, and reactive to light. Conjunctivae and EOM are normal. No scleral icterus.   Neck: Normal range of motion. Neck supple. No JVD present. No thyromegaly present.   Cardiovascular: Normal rate, regular rhythm and normal heart sounds.   No murmur heard.  Pulmonary/Chest: Effort normal and breath sounds normal. No respiratory distress. He has no decreased breath sounds. He has no wheezes. He exhibits no tenderness.   Abdominal: Soft. Bowel sounds are normal. He exhibits no mass. There is no tenderness.   Musculoskeletal: He exhibits tenderness. He exhibits no edema.        Right elbow: Tenderness found. Medial epicondyle and olecranon process tenderness noted.        Left knee: He exhibits decreased range of motion and swelling (mild). He exhibits no ecchymosis. Tenderness found. Medial joint line and lateral joint line tenderness noted.        Lumbar back: He exhibits decreased range of motion, tenderness, pain and spasm. He exhibits no swelling.   Gait limping.  Right arm/elbow " in splint.  No hand edema or arm edema noted.  Cap refill brisk.  No erythema of joints.     Skin Integrity  -  His right foot skin is intact.His left foot skin is intact..  Lymphadenopathy:        Head (right side): Submandibular adenopathy present.        Head (left side): Submandibular adenopathy present.     He has cervical adenopathy.        Right cervical: Superficial cervical adenopathy present.        Left cervical: Superficial cervical adenopathy present.   Neurological: He is alert and oriented to person, place, and time. He has normal reflexes. No cranial nerve deficit. He exhibits normal muscle tone. Coordination normal.   Skin: Skin is warm and dry. Capillary refill takes less than 2 seconds. No rash noted. He is not diaphoretic.   Varicosities noted on bilateral lower extremities.     Psychiatric: His speech is normal. Judgment and thought content normal. His mood appears not anxious. He is not actively hallucinating. Cognition and memory are normal. He does not exhibit a depressed mood. He expresses no homicidal and no suicidal ideation. He exhibits normal recent memory and normal remote memory.   Laughing and talkative today.   He is attentive.   Vitals reviewed.      Assessment/Plan     Problem List Items Addressed This Visit        Nervous and Auditory    Chronic bilateral low back pain with left-sided sciatica    Relevant Medications    HYDROcodone-acetaminophen (NORCO)  MG per tablet       Musculoskeletal and Integument    Chondromalacia, knee    Relevant Medications    HYDROcodone-acetaminophen (NORCO)  MG per tablet    Muscle spasm of both lower legs - Primary    Relevant Orders    US Venous Doppler Lower Extremity Bilateral (duplex)       Other    Generalized anxiety disorder    Overview     With insomnia         Current Assessment & Plan     Will increase Remeron today.  Patient encouraged to take half tablet for the next 4 to 5 days.  If ineffective may increase to 1 tablet  nightly.  Will consider further adjustment if symptoms or not improved at next visit         Relevant Medications    mirtazapine (REMERON) 30 MG tablet      Other Visit Diagnoses     Bilateral leg edema        Relevant Orders    US Venous Doppler Lower Extremity Bilateral (duplex)    Acute URI        Discussed his allergies and his risks for reaction to Zithromax as it is also a mycin related medication.      Relevant Medications    azithromycin (ZITHROMAX) 250 MG tablet        40 minutes face to face with patient.   Greater than 50 % of visit spent of disease process of usual progression of low back pain and referral symptoms into his BLE that could be related to his back problem.,  Advised medication use as directed, and cussed any potential complications including need for emergent care.   Patient provided time to ask questions and verbalize concerns.   Patient reports that he would like to see a neurologist regarding his leg symptoms    Patient's Body mass index is 34.14 kg/m². BMI is above normal parameters. Recommendations include: nutrition counseling.    Emotional support and active listening provided.  Patient provided time to verbalize feelings.  Understands disease processes and need for medications.  Understands reasons for urgent and emergent care.  Patient (& family) verbalized agreement for treatment plan    [facility] #[id number] reviewed today and consistent.  Will refill prescribed controlled medication today.  Patient is aware they cannot receive narcotics from any other provider except if under care of pain management or speciality clinic.  Risk and benefits of medication use has been reviewed.  History and physical exam exhibit continued safe and appropriate use of controlled substances.  The patient is aware of the potential for addiction and dependence.  This patient has been made aware of the appropriate use of such medications, including potential risk of somnolence, limited ability to drive and /  or work safely, and potential for overdose.    It has also been made clear that these medications are for use by this patient only, without concomitant use of alcohol or other substances unless prescribed/advised by medical provider.  Patient understands they may be subject to UDS and pill counts at random.      Patient considered to be low risk for addiction due to use of single controlled medications.  Patient understands and accepts these risks.  Patient need for medication will be reassessed at each visit.  Doses will be adjusted according to patient need and findings.    Goal of TX: Patient will not have any adverse reactions of medication.  Patient will have reduction in his chronic pain symptoms with use of hydrocodone as directed.  Patient will be able to remain active and have decrease in number of falls.    Patient advised to keep Benadryl on hand at home due to his past medication reactions and to have a current EpiPen that he should carry with him due to his multiple medication allergies    Will refer to general surgery as patient would like to see the doctor who will be performing his scopes in the office setting as well as at the hospital.  Discussed referral to GI at [facility] but patient understands he will likely see the PA and the doctor will do his scope.  He declines GI referral.    RTC 1 month, sooner if needed for problems or concerns.           This document has been electronically signed by:  BALDOMERO Thao, FNP-C    Dragon disclaimer:  Much of this encounter note is an electronic transcription/translation of spoken language to printed text. The electronic translation of spoken language may permit erroneous, or at times, nonsensical words or phrases to be inadvertently transcribed; Although I have reviewed the note for such errors, some may still exist.

## 2019-12-14 NOTE — ASSESSMENT & PLAN NOTE
Will increase Remeron today.  Patient encouraged to take half tablet for the next 4 to 5 days.  If ineffective may increase to 1 tablet nightly.  Will consider further adjustment if symptoms or not improved at next visit

## 2019-12-20 ENCOUNTER — HOSPITAL ENCOUNTER (OUTPATIENT)
Dept: CARDIOLOGY | Facility: HOSPITAL | Age: 49
Discharge: HOME | End: 2019-12-20
Admitting: NURSE PRACTITIONER

## 2019-12-20 ENCOUNTER — TELEPHONE (OUTPATIENT)
Dept: FAMILY MEDICINE CLINIC | Facility: CLINIC | Age: 49
End: 2019-12-20

## 2019-12-20 PROCEDURE — 93970 EXTREMITY STUDY: CPT

## 2019-12-20 PROCEDURE — 93970 EXTREMITY STUDY: CPT | Performed by: RADIOLOGY

## 2019-12-20 NOTE — TELEPHONE ENCOUNTER
----- Message from BALDOMERO Thao sent at 12/20/2019  9:37 AM EST -----  Doppler of leg shows good blood flow. No DVT    Spoke to luli and I told him his results

## 2019-12-27 ENCOUNTER — HOSPITAL ENCOUNTER (EMERGENCY)
Facility: HOSPITAL | Age: 49
Discharge: HOME | End: 2019-12-27
Attending: EMERGENCY MEDICINE | Admitting: FAMILY MEDICINE

## 2019-12-27 ENCOUNTER — APPOINTMENT (OUTPATIENT)
Dept: GENERAL RADIOLOGY | Facility: HOSPITAL | Age: 49
End: 2019-12-27

## 2019-12-27 VITALS
HEIGHT: 67 IN | SYSTOLIC BLOOD PRESSURE: 144 MMHG | DIASTOLIC BLOOD PRESSURE: 95 MMHG | RESPIRATION RATE: 20 BRPM | WEIGHT: 218 LBS | BODY MASS INDEX: 34.21 KG/M2 | TEMPERATURE: 98.1 F | HEART RATE: 98 BPM | OXYGEN SATURATION: 100 %

## 2019-12-27 DIAGNOSIS — S82.839A AVULSION FRACTURE OF DISTAL FIBULA: Primary | ICD-10-CM

## 2019-12-27 PROCEDURE — 73610 X-RAY EXAM OF ANKLE: CPT

## 2019-12-27 PROCEDURE — 99283 EMERGENCY DEPT VISIT LOW MDM: CPT

## 2019-12-27 PROCEDURE — 73610 X-RAY EXAM OF ANKLE: CPT | Performed by: RADIOLOGY

## 2019-12-27 RX ORDER — HYDROCODONE BITARTRATE AND ACETAMINOPHEN 7.5; 325 MG/1; MG/1
1 TABLET ORAL EVERY 6 HOURS PRN
Qty: 12 TABLET | Refills: 0 | Status: SHIPPED | OUTPATIENT
Start: 2019-12-27 | End: 2020-01-13

## 2019-12-27 RX ORDER — HYDROCODONE BITARTRATE AND ACETAMINOPHEN 7.5; 325 MG/1; MG/1
1 TABLET ORAL ONCE
Status: COMPLETED | OUTPATIENT
Start: 2019-12-27 | End: 2019-12-27

## 2019-12-27 RX ADMIN — HYDROCODONE BITARTRATE AND ACETAMINOPHEN 1 TABLET: 7.5; 325 TABLET ORAL at 19:44

## 2019-12-30 DIAGNOSIS — M54.42 CHRONIC BILATERAL LOW BACK PAIN WITH LEFT-SIDED SCIATICA: ICD-10-CM

## 2019-12-30 DIAGNOSIS — M94.262 CHONDROMALACIA OF LEFT KNEE: ICD-10-CM

## 2019-12-30 DIAGNOSIS — G89.29 CHRONIC BILATERAL LOW BACK PAIN WITH LEFT-SIDED SCIATICA: ICD-10-CM

## 2019-12-30 RX ORDER — HYDROCODONE BITARTRATE AND ACETAMINOPHEN 10; 325 MG/1; MG/1
1 TABLET ORAL 2 TIMES DAILY PRN
Qty: 30 TABLET | Refills: 0 | Status: SHIPPED | OUTPATIENT
Start: 2019-12-30 | End: 2020-01-13 | Stop reason: SDUPTHER

## 2019-12-30 NOTE — TELEPHONE ENCOUNTER
Pharmacy states that they did not receive norco prescription from the 13th. Could you resend this please?

## 2020-01-07 ENCOUNTER — APPOINTMENT (OUTPATIENT)
Dept: CT IMAGING | Facility: HOSPITAL | Age: 50
End: 2020-01-07

## 2020-01-07 ENCOUNTER — HOSPITAL ENCOUNTER (EMERGENCY)
Facility: HOSPITAL | Age: 50
Discharge: HOME | End: 2020-01-07
Attending: EMERGENCY MEDICINE | Admitting: EMERGENCY MEDICINE

## 2020-01-07 VITALS
HEART RATE: 82 BPM | TEMPERATURE: 98.1 F | HEIGHT: 67 IN | RESPIRATION RATE: 20 BRPM | OXYGEN SATURATION: 99 % | WEIGHT: 218 LBS | DIASTOLIC BLOOD PRESSURE: 88 MMHG | BODY MASS INDEX: 34.21 KG/M2 | SYSTOLIC BLOOD PRESSURE: 133 MMHG

## 2020-01-07 DIAGNOSIS — G89.29 CHRONIC NONINTRACTABLE HEADACHE, UNSPECIFIED HEADACHE TYPE: Primary | ICD-10-CM

## 2020-01-07 DIAGNOSIS — R51.9 CHRONIC NONINTRACTABLE HEADACHE, UNSPECIFIED HEADACHE TYPE: Primary | ICD-10-CM

## 2020-01-07 LAB
6-ACETYL MORPHINE: NEGATIVE
ALBUMIN SERPL-MCNC: 4.74 G/DL (ref 3.5–5.2)
ALBUMIN/GLOB SERPL: 1.2 G/DL
ALP SERPL-CCNC: 89 U/L (ref 39–117)
ALT SERPL W P-5'-P-CCNC: 23 U/L (ref 1–41)
AMPHET+METHAMPHET UR QL: NEGATIVE
ANION GAP SERPL CALCULATED.3IONS-SCNC: 12.7 MMOL/L (ref 5–15)
AST SERPL-CCNC: 25 U/L (ref 1–40)
BARBITURATES UR QL SCN: NEGATIVE
BASOPHILS # BLD AUTO: 0.02 10*3/MM3 (ref 0–0.2)
BASOPHILS NFR BLD AUTO: 0.3 % (ref 0–1.5)
BENZODIAZ UR QL SCN: NEGATIVE
BILIRUB SERPL-MCNC: 0.4 MG/DL (ref 0.2–1.2)
BILIRUB UR QL STRIP: NEGATIVE
BUN BLD-MCNC: 9 MG/DL (ref 6–20)
BUN/CREAT SERPL: 9.2 (ref 7–25)
BUPRENORPHINE SERPL-MCNC: NEGATIVE NG/ML
CALCIUM SPEC-SCNC: 9.6 MG/DL (ref 8.6–10.5)
CANNABINOIDS SERPL QL: NEGATIVE
CHLORIDE SERPL-SCNC: 98 MMOL/L (ref 98–107)
CLARITY UR: CLEAR
CO2 SERPL-SCNC: 27.3 MMOL/L (ref 22–29)
COCAINE UR QL: NEGATIVE
COLOR UR: ABNORMAL
CREAT BLD-MCNC: 0.98 MG/DL (ref 0.76–1.27)
DEPRECATED RDW RBC AUTO: 42.6 FL (ref 37–54)
EOSINOPHIL # BLD AUTO: 0.23 10*3/MM3 (ref 0–0.4)
EOSINOPHIL NFR BLD AUTO: 3.3 % (ref 0.3–6.2)
ERYTHROCYTE [DISTWIDTH] IN BLOOD BY AUTOMATED COUNT: 12.5 % (ref 12.3–15.4)
GFR SERPL CREATININE-BSD FRML MDRD: 82 ML/MIN/1.73
GLOBULIN UR ELPH-MCNC: 4 GM/DL
GLUCOSE BLD-MCNC: 120 MG/DL (ref 65–99)
GLUCOSE UR STRIP-MCNC: NEGATIVE MG/DL
HCT VFR BLD AUTO: 47.3 % (ref 37.5–51)
HGB BLD-MCNC: 16.9 G/DL (ref 13–17.7)
HGB UR QL STRIP.AUTO: NEGATIVE
IMM GRANULOCYTES # BLD AUTO: 0.01 10*3/MM3 (ref 0–0.05)
IMM GRANULOCYTES NFR BLD AUTO: 0.1 % (ref 0–0.5)
KETONES UR QL STRIP: NEGATIVE
LEUKOCYTE ESTERASE UR QL STRIP.AUTO: NEGATIVE
LYMPHOCYTES # BLD AUTO: 1.79 10*3/MM3 (ref 0.7–3.1)
LYMPHOCYTES NFR BLD AUTO: 25.4 % (ref 19.6–45.3)
MCH RBC QN AUTO: 33.3 PG (ref 26.6–33)
MCHC RBC AUTO-ENTMCNC: 35.7 G/DL (ref 31.5–35.7)
MCV RBC AUTO: 93.3 FL (ref 79–97)
METHADONE UR QL SCN: NEGATIVE
MONOCYTES # BLD AUTO: 0.65 10*3/MM3 (ref 0.1–0.9)
MONOCYTES NFR BLD AUTO: 9.2 % (ref 5–12)
NEUTROPHILS # BLD AUTO: 4.34 10*3/MM3 (ref 1.7–7)
NEUTROPHILS NFR BLD AUTO: 61.7 % (ref 42.7–76)
NITRITE UR QL STRIP: NEGATIVE
NRBC BLD AUTO-RTO: 0 /100 WBC (ref 0–0.2)
OPIATES UR QL: POSITIVE
OXYCODONE UR QL SCN: NEGATIVE
PCP UR QL SCN: NEGATIVE
PH UR STRIP.AUTO: <=5 [PH] (ref 5–8)
PLATELET # BLD AUTO: 206 10*3/MM3 (ref 140–450)
PMV BLD AUTO: 9.7 FL (ref 6–12)
POTASSIUM BLD-SCNC: 4.1 MMOL/L (ref 3.5–5.2)
PROT SERPL-MCNC: 8.7 G/DL (ref 6–8.5)
PROT UR QL STRIP: NEGATIVE
RBC # BLD AUTO: 5.07 10*6/MM3 (ref 4.14–5.8)
SODIUM BLD-SCNC: 138 MMOL/L (ref 136–145)
SP GR UR STRIP: 1.03 (ref 1–1.03)
UROBILINOGEN UR QL STRIP: ABNORMAL
WBC NRBC COR # BLD: 7.04 10*3/MM3 (ref 3.4–10.8)

## 2020-01-07 PROCEDURE — 80307 DRUG TEST PRSMV CHEM ANLYZR: CPT | Performed by: PHYSICIAN ASSISTANT

## 2020-01-07 PROCEDURE — 85025 COMPLETE CBC W/AUTO DIFF WBC: CPT | Performed by: PHYSICIAN ASSISTANT

## 2020-01-07 PROCEDURE — 70450 CT HEAD/BRAIN W/O DYE: CPT

## 2020-01-07 PROCEDURE — 36415 COLL VENOUS BLD VENIPUNCTURE: CPT

## 2020-01-07 PROCEDURE — 81003 URINALYSIS AUTO W/O SCOPE: CPT | Performed by: PHYSICIAN ASSISTANT

## 2020-01-07 PROCEDURE — 80053 COMPREHEN METABOLIC PANEL: CPT | Performed by: PHYSICIAN ASSISTANT

## 2020-01-07 PROCEDURE — 99284 EMERGENCY DEPT VISIT MOD MDM: CPT

## 2020-01-07 PROCEDURE — 70450 CT HEAD/BRAIN W/O DYE: CPT | Performed by: RADIOLOGY

## 2020-01-07 RX ORDER — SODIUM CHLORIDE 0.9 % (FLUSH) 0.9 %
10 SYRINGE (ML) INJECTION AS NEEDED
Status: DISCONTINUED | OUTPATIENT
Start: 2020-01-07 | End: 2020-01-07 | Stop reason: HOSPADM

## 2020-01-07 RX ORDER — OXYCODONE HYDROCHLORIDE AND ACETAMINOPHEN 5; 325 MG/1; MG/1
1 TABLET ORAL ONCE
Status: COMPLETED | OUTPATIENT
Start: 2020-01-07 | End: 2020-01-07

## 2020-01-07 RX ADMIN — OXYCODONE HYDROCHLORIDE AND ACETAMINOPHEN 1 TABLET: 5; 325 TABLET ORAL at 18:28

## 2020-01-07 RX ADMIN — SODIUM CHLORIDE 500 ML: 9 INJECTION, SOLUTION INTRAVENOUS at 16:10

## 2020-01-07 NOTE — ED PROVIDER NOTES
"Subjective   49-year-old white male presents secondary to headaches.  Patient states he is been having intermittent episodes of pressure-like headache over the past 2 months.  He states this has been worse since last evening.  This morning he states he felt somewhat confused though this seems to have improved at this point.  No lateralizing weakness.  He denies any change of medications other than his cholesterol medication.  He denies any fever.  Patient does have a history of brain tumor though this was approximately 30 years ago.  He denies falls injuries or other complaints.          Review of Systems   Constitutional: Negative.  Negative for fever.   Respiratory: Negative.    Cardiovascular: Negative.  Negative for chest pain.   Gastrointestinal: Negative.  Negative for abdominal pain.   Endocrine: Negative.    Genitourinary: Negative.  Negative for dysuria.   Skin: Negative.    Neurological: Positive for headaches.   Psychiatric/Behavioral: Negative.    All other systems reviewed and are negative.      Past Medical History:   Diagnosis Date   • Allergic    • Anxiety    • Arthritis    • Asthma    • Body piercing     REPORTS CYLICONE IN EARS   • Clotting disorder (CMS/Formerly [facility]) 2004    had a knee surgery   • Depression    • DVT (deep venous thrombosis) (CMS/Formerly [facility])     RIGHT RIGHT KNEE AFTER SURGERY YEARS AGO IN 2001 OR 2004   • Gastric ulcer    • GERD (gastroesophageal reflux disease)    • H/O migraine    • H/O sleep apnea     REPORTS DIAGNOSED WITH SLEEP APNEA AND COULDN'T STAND TO WEAR THE MACHINE   • Headache    • Heart attack (CMS/Formerly [facility])     REPORTS \"LIGHT HEART ATTACK A LONG TIME AGO\"  \"EARLY 90'S\"   • History of seizures     REPORTS LAST EPISODE WAS AROUND 1995.   • Hostility    • Hyperlipidemia    • Hypertension    • Knee pain, acute     Left   • Low back pain    • Migraine    • MRSA (methicillin resistant Staphylococcus aureus)     REPORTS LAST TESTED + 2004. WAS TREATED HE REPORTS.  RIGHT ARM, RIGHT KNEE.   • No " "natural teeth    • Obesity    • Poor historian    • [place] spotted fever    • Seizures (CMS/HCC)    • Tattoo    • Wears glasses        Allergies   Allergen Reactions   • Ciprofloxacin Anaphylaxis and Hives   • Mobic [Meloxicam] Other (See Comments)     Pt states, \"It make my feet and hands go numb and I can't hardly walk.\"    • Paxil [Paroxetine Hcl] Shortness Of Breath     Chest pain    • Peanut-Containing Drug Products Anaphylaxis   • Penicillins Anaphylaxis   • Pristiq [Desvenlafaxine Succinate Er] Dizziness   • Sulfa Antibiotics Anaphylaxis, Itching and Rash   • Doxycycline Hives   • Fish-Derived Products Hives   • Isosorbide Nitrate Rash     Rash, hives, had to use inhaler.    • Clarithromycin Rash   • Clindamycin/Lincomycin Rash   • Contrast Dye Itching and Rash   • Diltiazem Rash   • Gabapentin Rash   • Keflex [Cephalexin] Rash   • Metoprolol Rash   • Prednisone Rash and Other (See Comments)     Face, feet, and legs go completely numb per patient   • Robitussin Cough+ Chest Max St [Dextromethorphan-Guaifenesin] Itching   • Shrimp (Diagnostic) Rash   • Spironolactone Rash   • Viibryd [Vilazodone Hcl] Itching and Rash   • Zoloft [Sertraline Hcl] Hives and Itching       Past Surgical History:   Procedure Laterality Date   • BACK SURGERY     • BRAIN SURGERY  1986    Tumor removal    • CARDIAC CATHETERIZATION N/A 9/28/2018    Procedure: Left Heart Cath;  Surgeon: Leandro Daily MD;  Location:  [facility];  Service: Cardiology   • CHOLECYSTECTOMY     • CYST REMOVAL     • KNEE ARTHROSCOPY Left 10/20/2017    Procedure: Diagnostic arthroscopy left knee with chondroplasty;  Surgeon: Marco Aguirre MD;  Location: [facility] OR;  Service:    • KNEE SURGERY Right    • MOUTH SURGERY      FULL MOUTH EXTRACTION   • OTHER SURGICAL HISTORY      REPORTS 7 TICKS REMOVED FROM RIGHT ARM IN 2001 OR 2002   • TUMOR EXCISION      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       Family History   Problem " Relation Age of Onset   • Diabetes Mother    • Hypertension Mother    • Stroke Mother    • Diabetes Father    • Skin cancer Father    • Hypertension Father    • Heart attack Father    • Diabetes Brother    • Hypertension Brother    • Heart disease Maternal Aunt    • Heart disease Maternal Uncle    • Heart disease Paternal Aunt    • Heart disease Paternal Uncle    • Heart disease Maternal Grandmother    • Heart disease Maternal Grandfather    • Heart disease Paternal Grandmother    • Heart disease Paternal Grandfather        Social History     Socioeconomic History   • Marital status:      Spouse name: Becca   • Number of children: 2   • Years of education: 12   • Highest education level: Not on file   Occupational History   • Occupation: DISABLED   Social Needs   • Financial resource strain: Somewhat hard   • Food insecurity:     Worry: Sometimes true     Inability: Sometimes true   • Transportation needs:     Medical: No     Non-medical: No   Tobacco Use   • Smoking status: Current Every Day Smoker     Packs/day: 2.00     Years: 17.00     Pack years: 34.00     Types: Cigars, Cigarettes     Start date: 5/5/2010   • Smokeless tobacco: Never Used   Substance and Sexual Activity   • Alcohol use: No   • Drug use: No   • Sexual activity: Defer   Lifestyle   • Physical activity:     Days per week: 0 days     Minutes per session: 0 min   • Stress: To some extent   Relationships   • Social connections:     Talks on phone: Once a week     Gets together: Once a week     Attends [employer] service: Never     Active member of club or organization: No     Attends meetings of clubs or organizations: Never     Relationship status:            Objective   Physical Exam   Constitutional: He is oriented to person, place, and time. He appears well-developed and well-nourished. No distress.   HENT:   Head: Normocephalic and atraumatic.   Right Ear: External ear normal.   Left Ear: External ear normal.   Nose: Nose  normal.   Eyes: Pupils are equal, round, and reactive to light. Conjunctivae and EOM are normal.   Neck: Normal range of motion. Neck supple. No JVD present. No tracheal deviation present.   Cardiovascular: Normal rate, regular rhythm and normal heart sounds.   No murmur heard.  Pulmonary/Chest: Effort normal and breath sounds normal. No respiratory distress. He has no wheezes.   Abdominal: Soft. Bowel sounds are normal. There is no tenderness.   Musculoskeletal: Normal range of motion. He exhibits no edema or deformity.   Neurological: He is alert and oriented to person, place, and time. He displays normal reflexes. No cranial nerve deficit. He exhibits normal muscle tone. Coordination normal.   Skin: Skin is warm and dry. No rash noted. He is not diaphoretic. No erythema. No pallor.   Psychiatric: He has a normal mood and affect. His behavior is normal. Thought content normal.   Nursing note and vitals reviewed.      Procedures           ED Course  ED Course as of Jan 07 1850   Tue Jan 07, 2020   1730 Long wait on CT.    [JI]      ED Course User Index  [JI] Almas Mills PA                                               MDM  Number of Diagnoses or Management Options  Chronic nonintractable headache, unspecified headache type: new and requires workup     Amount and/or Complexity of Data Reviewed  Clinical lab tests: reviewed and ordered  Discuss the patient with other providers: yes    Risk of Complications, Morbidity, and/or Mortality  Presenting problems: moderate        Final diagnoses:   Chronic nonintractable headache, unspecified headache type            Almas Mills PA  01/07/20 1580

## 2020-01-13 ENCOUNTER — OFFICE VISIT (OUTPATIENT)
Dept: FAMILY MEDICINE CLINIC | Facility: CLINIC | Age: 50
End: 2020-01-13

## 2020-01-13 ENCOUNTER — RESULTS ENCOUNTER (OUTPATIENT)
Dept: FAMILY MEDICINE CLINIC | Facility: CLINIC | Age: 50
End: 2020-01-13

## 2020-01-13 VITALS
TEMPERATURE: 97.2 F | DIASTOLIC BLOOD PRESSURE: 82 MMHG | SYSTOLIC BLOOD PRESSURE: 132 MMHG | BODY MASS INDEX: 35.79 KG/M2 | WEIGHT: 228 LBS | OXYGEN SATURATION: 96 % | HEART RATE: 85 BPM | HEIGHT: 67 IN

## 2020-01-13 DIAGNOSIS — M54.42 CHRONIC BILATERAL LOW BACK PAIN WITH LEFT-SIDED SCIATICA: ICD-10-CM

## 2020-01-13 DIAGNOSIS — R63.1 POLYDIPSIA: Primary | ICD-10-CM

## 2020-01-13 DIAGNOSIS — G89.29 CHRONIC BILATERAL LOW BACK PAIN WITH LEFT-SIDED SCIATICA: ICD-10-CM

## 2020-01-13 DIAGNOSIS — J30.1 SEASONAL ALLERGIC RHINITIS DUE TO POLLEN: ICD-10-CM

## 2020-01-13 DIAGNOSIS — M94.262 CHONDROMALACIA OF LEFT KNEE: ICD-10-CM

## 2020-01-13 LAB — GLUCOSE BLDC GLUCOMTR-MCNC: 116 MG/DL (ref 70–130)

## 2020-01-13 PROCEDURE — 82962 GLUCOSE BLOOD TEST: CPT | Performed by: NURSE PRACTITIONER

## 2020-01-13 PROCEDURE — 99214 OFFICE O/P EST MOD 30 MIN: CPT | Performed by: NURSE PRACTITIONER

## 2020-01-13 RX ORDER — DIPHENHYDRAMINE HCL 25 MG
25 TABLET ORAL EVERY 6 HOURS PRN
Qty: 30 TABLET | Refills: 1 | Status: SHIPPED | OUTPATIENT
Start: 2020-01-13 | End: 2021-06-28 | Stop reason: SDUPTHER

## 2020-01-13 RX ORDER — LORATADINE 10 MG/1
10 TABLET ORAL DAILY PRN
Qty: 30 TABLET | Refills: 5 | Status: SHIPPED | OUTPATIENT
Start: 2020-01-13 | End: 2020-07-09 | Stop reason: SDUPTHER

## 2020-01-13 RX ORDER — HYDROCODONE BITARTRATE AND ACETAMINOPHEN 10; 325 MG/1; MG/1
1 TABLET ORAL 2 TIMES DAILY PRN
Qty: 30 TABLET | Refills: 0 | Status: SHIPPED | OUTPATIENT
Start: 2020-01-13 | End: 2020-02-13 | Stop reason: SDUPTHER

## 2020-01-13 NOTE — PROGRESS NOTES
"Subjective   Jaquan Mckay is a 49 y.o. male.     Chief Complaint   Patient presents with   • Anxiety   • Fall   • Knee Pain       History of Present Illness     Hospital follow-up- patient was seen in January stable at [facility], [place] for reports of headache that has been increasing over the last several months.  CT of head did not show any acute ischemia.  No hematoma was noted gray-white matter was appropriate.  The posterior fossa was unremarkable.  Patient does have history of a brain tumor but nothing was seen to account for the patient's symptoms.  He reports he was seen after a fall and \"I hit my head and had a knot\".  He reports \"tripped\".  He was concerned as \"I could not remember the day or someone's phone number\".    Back and joint pain-chronic and ongoing.  Patient continues to have chronic knee pain and back pain.  He has been to multiple orthopedics regarding his knee pain.  He is presently in a walking boot on the right foot.  He reports he has to be in \"4 more weeks\".  He is under the care of Dr Florez in [place].  He request to see someone \"from [facility]\" that comes to [place].  Patient has had multiple orthopedic appts.  He reports current provider \"only wants to give cortisone\" and \"I am not going back\".   Not at goal.   Dry mouth and thirst-reports he has been noting an increase in thirst.  He reports he has drunk multiple bottles of water over the last week.  He reports he has ate oatmeal this morning so he could take meds.  He questions if it is a SE of meds.  His most recent new meds is Xyzal.  He feels his HA has been occurring since that time.  Ongoing.    HTN-chronic and ongoing.  Stable today.  He is taking Lisinopril 20 mg as directed.  No negative SE's are reported.  No CP or SOA.  No palpitations.      The following portions of the patient's history were reviewed and updated as appropriate: CC, ROS, allergies, current medications, past family history, past medical history, past social history, past " "surgical history and problem list.    Review of Systems   Constitutional: Positive for fatigue. Negative for appetite change and unexpected weight change.   HENT: Positive for congestion, rhinorrhea and sinus pressure. Negative for ear pain, postnasal drip, sinus pain, sore throat, trouble swallowing and voice change.    Eyes: Negative for pain and visual disturbance.   Respiratory: Positive for cough and shortness of breath. Negative for chest tightness and wheezing.    Cardiovascular: Negative for chest pain, palpitations and leg swelling.   Gastrointestinal: Positive for abdominal pain and constipation. Negative for blood in stool, diarrhea and rectal pain.   Endocrine: Negative for cold intolerance and polydipsia.   Genitourinary: Negative for difficulty urinating, dysuria, flank pain and hematuria.   Musculoskeletal: Positive for arthralgias, back pain and myalgias. Negative for gait problem and joint swelling.   Skin: Negative for color change and rash.   Allergic/Immunologic: Negative.    Neurological: Positive for dizziness and headaches. Negative for syncope and numbness.   Hematological: Negative.    Psychiatric/Behavioral: Positive for dysphoric mood and sleep disturbance. Negative for suicidal ideas. The patient is not nervous/anxious.    All other systems reviewed and are negative.      Objective     /82   Pulse 85   Temp 97.2 °F (36.2 °C) (Temporal)   Ht 170.2 cm (67\")   Wt 103 kg (228 lb)   SpO2 96%   BMI 35.71 kg/m²     Physical Exam   Constitutional: He is oriented to person, place, and time. He appears well-developed and well-nourished. No distress.   HENT:   Head: Normocephalic and atraumatic.   Right Ear: External ear and ear canal normal. No middle ear effusion.   Left Ear: External ear and ear canal normal.  No middle ear effusion.   Nose: Rhinorrhea present. No mucosal edema. Right sinus exhibits no maxillary sinus tenderness and no frontal sinus tenderness. Left sinus exhibits no " maxillary sinus tenderness and no frontal sinus tenderness.   Mouth/Throat: No oropharyngeal exudate or posterior oropharyngeal erythema.   Eyes: Pupils are equal, round, and reactive to light. Conjunctivae and EOM are normal. No scleral icterus.   Neck: Normal range of motion. Neck supple. No JVD present. No thyromegaly present.   Cardiovascular: Normal rate, regular rhythm and normal heart sounds.   No murmur heard.  Pulmonary/Chest: Effort normal and breath sounds normal. No respiratory distress. He has no decreased breath sounds. He has no wheezes. He exhibits no tenderness.   Abdominal: Soft. Bowel sounds are normal. He exhibits no mass. There is no tenderness.   Musculoskeletal: He exhibits tenderness. He exhibits no edema.        Right elbow: Tenderness found. Medial epicondyle and olecranon process tenderness noted.        Left knee: He exhibits decreased range of motion and swelling (mild). He exhibits no ecchymosis. Tenderness found. Medial joint line and lateral joint line tenderness noted.        Lumbar back: He exhibits decreased range of motion, tenderness, pain and spasm. He exhibits no swelling.   Gait limping.  Walking boot on right foot.  Cap refill brisk.  No erythema of joints.     Skin Integrity  -  His right foot skin is intact.His left foot skin is intact..  Lymphadenopathy:        Head (right side): No submandibular adenopathy present.        Head (left side): No submandibular adenopathy present.     He has no cervical adenopathy.        Right cervical: No superficial cervical adenopathy present.       Left cervical: No superficial cervical adenopathy present.   Neurological: He is alert and oriented to person, place, and time. He has normal reflexes. No cranial nerve deficit. He exhibits normal muscle tone. Coordination normal.   Skin: Skin is warm and dry. Capillary refill takes less than 2 seconds. No rash noted. He is not diaphoretic.   Varicosities noted on bilateral lower extremities.      Psychiatric: His speech is normal. Judgment and thought content normal. His mood appears not anxious. He is not actively hallucinating. Cognition and memory are normal. He does not exhibit a depressed mood. He expresses no homicidal and no suicidal ideation. He exhibits normal recent memory and normal remote memory.   Laughing and talkative today.   He is attentive.   Vitals reviewed.      Assessment/Plan     Problem List Items Addressed This Visit        Respiratory    Seasonal allergic rhinitis due to pollen    Relevant Medications    loratadine (CLARITIN) 10 MG tablet    diphenhydrAMINE (BENADRYL ALLERGY) 25 MG tablet       Nervous and Auditory    Chronic bilateral low back pain with left-sided sciatica    Relevant Medications    HYDROcodone-acetaminophen (NORCO)  MG per tablet    Other Relevant Orders    Urine Drug Screen - Urine, Clean Catch       Musculoskeletal and Integument    Chondromalacia, knee    Relevant Medications    HYDROcodone-acetaminophen (NORCO)  MG per tablet    Other Relevant Orders    Urine Drug Screen - Urine, Clean Catch    Ambulatory Referral to Orthopedic Surgery (Completed)      Other Visit Diagnoses     Polydipsia    -  Primary    Relevant Orders    POC Glucose (Completed)          Patient's Body mass index is 35.71 kg/m². BMI is above normal parameters. Recommendations include: nutrition counseling.     Emotional support and active listening provided.  Patient provided time to verbalize feelings.  Understands disease processes and need for medications.  Understands reasons for urgent and emergent care.  Patient (& family) verbalized agreement for treatment plan    CHAVEZ #[id number] reviewed today and consistent.  Will refill prescribed controlled medication today.  Patient is aware they cannot receive narcotics from any other provider except if under care of pain management or speciality clinic.  Risk and benefits of medication use has been reviewed.  History and physical  exam exhibit continued safe and appropriate use of controlled substances.  The patient is aware of the potential for addiction and dependence.  This patient has been made aware of the appropriate use of such medications, including potential risk of somnolence, limited ability to drive and / or work safely, and potential for overdose.    It has also been made clear that these medications are for use by this patient only, without concomitant use of alcohol or other substances unless prescribed/advised by medical provider.  Patient understands they may be subject to UDS and pill counts at random.      Patient considered to be low risk for addiction due to use of single controlled medications.  Patient understands and accepts these risks.  Patient need for medication will be reassessed at each visit.  Doses will be adjusted according to patient need and findings.    Goal of TX: Patient will not have any adverse reactions of medication.  Patient will have reduction in his chronic back and joint pain symptoms with use of hydrocodone as directed.    UDS requested per [facility] while patient in office today.  Buccal swab or blood sample will be obtained if patient is unable to provide specimen.     Placed an orthopedic appt to different clinic per patient's request.      RTC 1 month, sooner if needed.           This document has been electronically signed by:  BALDOMERO Thao, FNP-C    Dragon disclaimer:  Much of this encounter note is an electronic transcription/translation of spoken language to printed text. The electronic translation of spoken language may permit erroneous, or at times, nonsensical words or phrases to be inadvertently transcribed; Although I have reviewed the note for such errors, some may still exist.

## 2020-01-22 ENCOUNTER — RESULTS ENCOUNTER (OUTPATIENT)
Dept: ENDOCRINOLOGY | Facility: CLINIC | Age: 50
End: 2020-01-22

## 2020-01-22 ENCOUNTER — OFFICE VISIT (OUTPATIENT)
Dept: ENDOCRINOLOGY | Facility: CLINIC | Age: 50
End: 2020-01-22

## 2020-01-22 ENCOUNTER — LAB (OUTPATIENT)
Dept: LAB | Facility: HOSPITAL | Age: 50
End: 2020-01-22

## 2020-01-22 VITALS
SYSTOLIC BLOOD PRESSURE: 130 MMHG | OXYGEN SATURATION: 99 % | BODY MASS INDEX: 36.52 KG/M2 | DIASTOLIC BLOOD PRESSURE: 80 MMHG | HEIGHT: 67 IN | HEART RATE: 85 BPM | WEIGHT: 232.7 LBS

## 2020-01-22 DIAGNOSIS — R53.83 FATIGUE, UNSPECIFIED TYPE: ICD-10-CM

## 2020-01-22 DIAGNOSIS — M62.838 MUSCLE SPASM OF BOTH LOWER LEGS: ICD-10-CM

## 2020-01-22 DIAGNOSIS — R79.89 ELEVATED PROLACTIN LEVEL: Primary | ICD-10-CM

## 2020-01-22 DIAGNOSIS — R79.89 ELEVATED PROLACTIN LEVEL: ICD-10-CM

## 2020-01-22 LAB
ALBUMIN SERPL-MCNC: 4.2 G/DL (ref 3.5–5.2)
ALBUMIN/GLOB SERPL: 1.2 G/DL
ALP SERPL-CCNC: 72 U/L (ref 39–117)
ALT SERPL W P-5'-P-CCNC: 21 U/L (ref 1–41)
ANION GAP SERPL CALCULATED.3IONS-SCNC: 8 MMOL/L (ref 5–15)
AST SERPL-CCNC: 19 U/L (ref 1–40)
BILIRUB SERPL-MCNC: 0.2 MG/DL (ref 0.2–1.2)
BUN BLD-MCNC: 15 MG/DL (ref 6–20)
BUN/CREAT SERPL: 16.3 (ref 7–25)
CALCIUM SPEC-SCNC: 8.9 MG/DL (ref 8.6–10.5)
CHLORIDE SERPL-SCNC: 105 MMOL/L (ref 98–107)
CO2 SERPL-SCNC: 27 MMOL/L (ref 22–29)
CORTIS SERPL-MCNC: 13.35 MCG/DL
CORTIS SERPL-MCNC: 17.22 MCG/DL
CORTIS SERPL-MCNC: 8.84 MCG/DL
CREAT BLD-MCNC: 0.92 MG/DL (ref 0.76–1.27)
GFR SERPL CREATININE-BSD FRML MDRD: 88 ML/MIN/1.73
GLOBULIN UR ELPH-MCNC: 3.4 GM/DL
GLUCOSE BLD-MCNC: 96 MG/DL (ref 65–99)
LH SERPL-ACNC: 7.65 MIU/ML
OSMOLALITY SERPL: 297 MOSM/KG (ref 275–295)
POTASSIUM BLD-SCNC: 4.2 MMOL/L (ref 3.5–5.2)
PROLACTIN SERPL-MCNC: 13.9 NG/ML (ref 4.04–15.2)
PROT SERPL-MCNC: 7.6 G/DL (ref 6–8.5)
SODIUM BLD-SCNC: 140 MMOL/L (ref 136–145)
T4 FREE SERPL-MCNC: 0.89 NG/DL (ref 0.93–1.7)
TESTOST SERPL-MCNC: 627 NG/DL (ref 249–836)
TSH SERPL DL<=0.05 MIU/L-ACNC: 2.54 UIU/ML (ref 0.27–4.2)

## 2020-01-22 PROCEDURE — 84439 ASSAY OF FREE THYROXINE: CPT | Performed by: INTERNAL MEDICINE

## 2020-01-22 PROCEDURE — 84305 ASSAY OF SOMATOMEDIN: CPT | Performed by: INTERNAL MEDICINE

## 2020-01-22 PROCEDURE — 96372 THER/PROPH/DIAG INJ SC/IM: CPT | Performed by: INTERNAL MEDICINE

## 2020-01-22 PROCEDURE — 99243 OFF/OP CNSLTJ NEW/EST LOW 30: CPT | Performed by: INTERNAL MEDICINE

## 2020-01-22 PROCEDURE — 84443 ASSAY THYROID STIM HORMONE: CPT | Performed by: INTERNAL MEDICINE

## 2020-01-22 PROCEDURE — 82533 TOTAL CORTISOL: CPT | Performed by: INTERNAL MEDICINE

## 2020-01-22 PROCEDURE — 83930 ASSAY OF BLOOD OSMOLALITY: CPT | Performed by: INTERNAL MEDICINE

## 2020-01-22 PROCEDURE — 84146 ASSAY OF PROLACTIN: CPT | Performed by: INTERNAL MEDICINE

## 2020-01-22 PROCEDURE — 82024 ASSAY OF ACTH: CPT | Performed by: INTERNAL MEDICINE

## 2020-01-22 PROCEDURE — 83002 ASSAY OF GONADOTROPIN (LH): CPT | Performed by: INTERNAL MEDICINE

## 2020-01-22 PROCEDURE — 80053 COMPREHEN METABOLIC PANEL: CPT | Performed by: INTERNAL MEDICINE

## 2020-01-22 PROCEDURE — 84403 ASSAY OF TOTAL TESTOSTERONE: CPT | Performed by: INTERNAL MEDICINE

## 2020-01-22 RX ORDER — COSYNTROPIN 0.25 MG/ML
0.25 INJECTION, POWDER, FOR SOLUTION INTRAMUSCULAR; INTRAVENOUS ONCE
Status: COMPLETED | OUTPATIENT
Start: 2020-01-22 | End: 2020-01-22

## 2020-01-22 RX ADMIN — COSYNTROPIN 0.25 MG: 0.25 INJECTION, POWDER, FOR SOLUTION INTRAMUSCULAR; INTRAVENOUS at 10:09

## 2020-01-22 NOTE — PROGRESS NOTES
Elevated Prolactin level (Consult for LONG Valenzuela)    Subjective    Jaquan Mckay is a 49 y.o. male. he is being seen for consultation today at the request of  Tiera Valenzuela APRN for evaluation of elevated prolactin.   Patient has multiple medical issues and fall - he has a bad knee that gives out. 2 years ago he had hospitalization with low BP. He also has a seizure disorder and dilantin level was low.   He was noted to have a high prolactin level 15.8-22 on several occasions. He was told of possible pituitary tumor. Testosterone level was normal then. He cant have MRI because of anxiety. He is allergic to die and CT could be done with premedication.   Seizures since childhood. He was found to have a brain tumor and it was operated. Seizures improved and he didn't have a grand mal seizure since 1998. He also c/o anxiety. He reported having a partial seizures - weakness, chills and confusion.   He is wearing a boot for ankle fracture - after fall.   C/o headaches, appetite is good, no weight loss or abdominal pain, reported nausea during the seizure episodes. 2 years ago he had low BP, improved after stopping several meds. He also reported low glucose episodes and episodes of passing out.    He is taking opioids for pain.   He had a steroid injection in the right elbow in Dec 2019.     Patient has a multiple medical problems and hx of multiple surgeries.     History of Present Illness    Review of Systems  Review of Systems   Constitutional: Positive for fatigue.   Cardiovascular: Positive for palpitations.   Neurological: Positive for weakness, headache and confusion.   Psychiatric/Behavioral: Positive for depressed mood. The patient is nervous/anxious.    All other systems reviewed and are negative.    Current medications:  Current Outpatient Medications   Medication Sig Dispense Refill   • albuterol (PROVENTIL) (2.5 MG/3ML) 0.083% nebulizer solution Take 2.5 mg by nebulization Every 4 (Four) Hours As Needed for  Shortness of Air. 180 vial 5   • albuterol sulfate  (90 Base) MCG/ACT inhaler Inhale 2 puffs Every 4 (Four) Hours As Needed for Shortness of Air. 1 inhaler 5   • ASPIRIN ADULT LOW STRENGTH 81 MG EC tablet TAKE 1 TABLET BY MOUTH DAILY FOR HEART HEALTH AND CIRCULATION 30 tablet 3   • atorvastatin (LIPITOR) 40 MG tablet Take 1 tablet by mouth Daily. 90 tablet 3   • baclofen (LIORESAL) 10 MG tablet Take 0.5-1 tablets by mouth 2 (Two) Times a Day. 60 tablet 2   • Blood Pressure Monitoring (HEALTH SENSE BP MONITOR) device 1 each Daily. 1 each 0   • budesonide-formoterol (SYMBICORT) 160-4.5 MCG/ACT inhaler Inhale 2 puffs 2 (Two) Times a Day. 10.2 g 5   • busPIRone (BUSPAR) 5 MG tablet Take 1 tablet by mouth 3 (Three) Times a Day. 90 tablet 2   • diphenhydrAMINE (BENADRYL ALLERGY) 25 MG tablet Take 1 tablet by mouth Every 6 (Six) Hours As Needed for Itching or Allergies. 30 tablet 1   • HYDROcodone-acetaminophen (NORCO)  MG per tablet Take 1 tablet by mouth 2 (Two) Times a Day As Needed for Moderate Pain . 30 tablet 0   • ketoconazole (NIZORAL) 2 % shampoo Apply  topically to the appropriate area as directed 2 (Two) Times a Week. To scalp 120 mL 5   • lisinopril (PRINIVIL,ZESTRIL) 20 MG tablet Take 2 tablets by mouth Daily. FOR BLOOD PRESSURE 60 tablet 5   • loratadine (CLARITIN) 10 MG tablet Take 1 tablet by mouth Daily As Needed for Allergies. 30 tablet 5   • meclizine (ANTIVERT) 12.5 MG tablet Take 1 tablet by mouth 3 (Three) Times a Day As Needed for dizziness. 30 tablet 0   • mirtazapine (REMERON) 30 MG tablet Take 0.5-1 tablets by mouth Every Night. 30 tablet 1   • montelukast (SINGULAIR) 10 MG tablet Take 1 tablet by mouth Every Night. 30 tablet 5   • pantoprazole (PROTONIX) 40 MG EC tablet Take 1 tablet by mouth Daily. 30 tablet 5   • phenytoin extended (DILANTIN) 200 MG ER capsule Take 1 capsule by mouth Every 12 (Twelve) Hours. NEEDS BRAND NAME NOT GENERIC 60 capsule 0     No current  facility-administered medications for this visit.        The following portions of the patient's history were reviewed and updated as appropriate: He  has a past medical history of Allergic, Anxiety, Arthritis, Asthma, Body piercing, Clotting disorder (CMS/HCC) (2004), Depression, DVT (deep venous thrombosis) (CMS/[facility]), Gastric ulcer, GERD (gastroesophageal reflux disease), H/O migraine, H/O sleep apnea, Headache, Heart attack (CMS/[facility]), History of seizures, Hostility, Hyperlipidemia, Hypertension, Knee pain, acute, Low back pain, Migraine, MRSA (methicillin resistant Staphylococcus aureus), 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, Obesity, Poor historian, [place] spotted fever, Seizures (CMS/[facility]), Tattoo, and Wears glasses.  He  has a past surgical history that includes Back surgery; Cholecystectomy; Tumor excision; Knee surgery (Right); Brain surgery (1986); Mouth surgery; Other surgical history; Knee Arthroscopy (Left, 10/20/2017); Cyst Removal; and Cardiac catheterization (N/A, 9/28/2018).  His family history includes Diabetes in his brother, father, and mother; Heart attack in his father; Heart disease in his maternal aunt, maternal grandfather, maternal grandmother, maternal uncle, paternal aunt, paternal grandfather, paternal grandmother, and paternal uncle; Hypertension in his brother, father, and mother; Skin cancer in his father; Stroke in his mother.  He  reports that he has been smoking cigars and cigarettes. He started smoking about 9 years ago. He has a 34.00 pack-year smoking history. He has never used smokeless tobacco. He reports that he does not drink alcohol or use drugs.  He is allergic to ciprofloxacin; mobic [meloxicam]; paxil [paroxetine hcl]; peanut-containing drug products; penicillins; pristiq [desvenlafaxine succinate er]; sulfa antibiotics; doxycycline; fish-derived products; isosorbide nitrate; clarithromycin; clindamycin/lincomycin; contrast dye; diltiazem; gabapentin; keflex [cephalexin]; metoprolol;  "prednisone; robitussin cough+ chest max st [dextromethorphan-guaifenesin]; shrimp (diagnostic); spironolactone; viibryd [vilazodone hcl]; and zoloft [sertraline hcl]..        Objective      Vitals:    01/22/20 0827   BP: 130/80   Pulse: 85   SpO2: 99%   Weight: 106 kg (232 lb 11.2 oz)   Height: 170.2 cm (67\")   Body mass index is 36.45 kg/m².  Physical Exam   Constitutional: He is oriented to person, place, and time. He appears well-developed and well-nourished.   HENT:   Head: Normocephalic and atraumatic.   Eyes: Pupils are equal, round, and reactive to light. Conjunctivae and EOM are normal.   No visual field deficits.    Neck: No thyromegaly present.   Cardiovascular: Normal rate, regular rhythm, normal heart sounds and intact distal pulses.   Pulmonary/Chest: Effort normal and breath sounds normal.   Musculoskeletal: He exhibits no edema.   Lymphadenopathy:     He has no cervical adenopathy.   Neurological: He is alert and oriented to person, place, and time.   Psychiatric: He has a normal mood and affect. Thought content normal.       LABS AND IMAGING  Lab on 01/22/2020   Component Date Value Ref Range Status   • Cortisol 01/22/2020 13.35    mcg/dL Final   • Cortisol 01/22/2020 17.22    mcg/dL Final   Office Visit on 01/22/2020   Component Date Value Ref Range Status   • ACTH 01/22/2020 32.5  7.2 - 63.3 pg/mL Final    ACTH reference interval for samples collected between 7 and 10 AM.   • Insulin-Like Growth Factor-1 01/22/2020 200  67 - 205 ng/mL Final   • Prolactin 01/22/2020 13.90  4.04 - 15.20 ng/mL Final   • Free T4 01/22/2020 0.89* 0.93 - 1.70 ng/dL Final   • TSH 01/22/2020 2.540  0.270 - 4.200 uIU/mL Final   • Testosterone, Total 01/22/2020 627.00  249.00 - 836.00 ng/dL Final   • LH 01/22/2020 7.65  mIU/mL Final   • Glucose 01/22/2020 96  65 - 99 mg/dL Final   • BUN 01/22/2020 15  6 - 20 mg/dL Final   • Creatinine 01/22/2020 0.92  0.76 - 1.27 mg/dL Final   • Sodium 01/22/2020 140  136 - 145 mmol/L Final "   • Potassium 01/22/2020 4.2  3.5 - 5.2 mmol/L Final   • Chloride 01/22/2020 105  98 - 107 mmol/L Final   • CO2 01/22/2020 27.0  22.0 - 29.0 mmol/L Final   • Calcium 01/22/2020 8.9  8.6 - 10.5 mg/dL Final   • Total Protein 01/22/2020 7.6  6.0 - 8.5 g/dL Final   • Albumin 01/22/2020 4.20  3.50 - 5.20 g/dL Final   • ALT (SGPT) 01/22/2020 21  1 - 41 U/L Final   • AST (SGOT) 01/22/2020 19  1 - 40 U/L Final   • Alkaline Phosphatase 01/22/2020 72  39 - 117 U/L Final   • Total Bilirubin 01/22/2020 0.2  0.2 - 1.2 mg/dL Final   • eGFR Non African Amer 01/22/2020 88  >60 mL/min/1.73 Final   • Globulin 01/22/2020 3.4  gm/dL Final   • A/G Ratio 01/22/2020 1.2  g/dL Final   • BUN/Creatinine Ratio 01/22/2020 16.3  7.0 - 25.0 Final   • Anion Gap 01/22/2020 8.0  5.0 - 15.0 mmol/L Final   • Osmolality 01/22/2020 297* 275 - 295 mOsm/kg Final   • Cortisol 01/22/2020 8.84    mcg/dL Final   Office Visit on 01/13/2020   Component Date Value Ref Range Status   • Glucose 01/13/2020 116  70 - 130 mg/dL Final   Admission on 01/07/2020, Discharged on 01/07/2020   Component Date Value Ref Range Status   • Glucose 01/07/2020 120* 65 - 99 mg/dL Final   • BUN 01/07/2020 9  6 - 20 mg/dL Final   • Creatinine 01/07/2020 0.98  0.76 - 1.27 mg/dL Final   • Sodium 01/07/2020 138  136 - 145 mmol/L Final   • Potassium 01/07/2020 4.1  3.5 - 5.2 mmol/L Final   • Chloride 01/07/2020 98  98 - 107 mmol/L Final   • CO2 01/07/2020 27.3  22.0 - 29.0 mmol/L Final   • Calcium 01/07/2020 9.6  8.6 - 10.5 mg/dL Final   • Total Protein 01/07/2020 8.7* 6.0 - 8.5 g/dL Final   • Albumin 01/07/2020 4.74  3.50 - 5.20 g/dL Final   • ALT (SGPT) 01/07/2020 23  1 - 41 U/L Final   • AST (SGOT) 01/07/2020 25  1 - 40 U/L Final   • Alkaline Phosphatase 01/07/2020 89  39 - 117 U/L Final   • Total Bilirubin 01/07/2020 0.4  0.2 - 1.2 mg/dL Final   • eGFR Non African Amer 01/07/2020 82  >60 mL/min/1.73 Final   • Globulin 01/07/2020 4.0  gm/dL Final   • A/G Ratio 01/07/2020 1.2  g/dL  Final   • BUN/Creatinine Ratio 01/07/2020 9.2  7.0 - 25.0 Final   • Anion Gap 01/07/2020 12.7  5.0 - 15.0 mmol/L Final   • Color, UA 01/07/2020 Dark Yellow* Yellow, Straw Final   • Appearance, UA 01/07/2020 Clear  Clear Final   • pH, UA 01/07/2020 <=5.0  5.0 - 8.0 Final   • Specific Gravity, UA 01/07/2020 1.029  1.005 - 1.030 Final   • Glucose, UA 01/07/2020 Negative  Negative Final   • Ketones, UA 01/07/2020 Negative  Negative Final   • Bilirubin, UA 01/07/2020 Negative  Negative Final   • Blood, UA 01/07/2020 Negative  Negative Final   • Protein, UA 01/07/2020 Negative  Negative Final   • Leuk Esterase, UA 01/07/2020 Negative  Negative Final   • Nitrite, UA 01/07/2020 Negative  Negative Final   • Urobilinogen, UA 01/07/2020 0.2 E.U./dL  0.2 - 1.0 E.U./dL Final   • Amphetamine Screen, Urine 01/07/2020 Negative  Negative Final   • Barbiturates Screen, Urine 01/07/2020 Negative  Negative Final   • Benzodiazepine Screen, Urine 01/07/2020 Negative  Negative Final   • Cocaine Screen, Urine 01/07/2020 Negative  Negative Final   • Methadone Screen, Urine 01/07/2020 Negative  Negative Final   • Opiate Screen 01/07/2020 Positive* Negative Final   • Phencyclidine (PCP), Urine 01/07/2020 Negative  Negative Final   • THC, Screen, Urine 01/07/2020 Negative  Negative Final   • 6-ACETYL MORPHINE 01/07/2020 Negative  Negative Final   • Buprenorphine, Screen, Urine 01/07/2020 Negative  Negative Final   • Oxycodone Screen, Urine 01/07/2020 Negative  Negative Final   • WBC 01/07/2020 7.04  3.40 - 10.80 10*3/mm3 Final   • RBC 01/07/2020 5.07  4.14 - 5.80 10*6/mm3 Final   • Hemoglobin 01/07/2020 16.9  13.0 - 17.7 g/dL Final   • Hematocrit 01/07/2020 47.3  37.5 - 51.0 % Final   • MCV 01/07/2020 93.3  79.0 - 97.0 fL Final   • MCH 01/07/2020 33.3* 26.6 - 33.0 pg Final   • MCHC 01/07/2020 35.7  31.5 - 35.7 g/dL Final   • RDW 01/07/2020 12.5  12.3 - 15.4 % Final   • RDW-SD 01/07/2020 42.6  37.0 - 54.0 fl Final   • MPV 01/07/2020 9.7  6.0 -  12.0 fL Final   • Platelets 01/07/2020 206  140 - 450 10*3/mm3 Final   • Neutrophil % 01/07/2020 61.7  42.7 - 76.0 % Final   • Lymphocyte % 01/07/2020 25.4  19.6 - 45.3 % Final   • Monocyte % 01/07/2020 9.2  5.0 - 12.0 % Final   • Eosinophil % 01/07/2020 3.3  0.3 - 6.2 % Final   • Basophil % 01/07/2020 0.3  0.0 - 1.5 % Final   • Immature Grans % 01/07/2020 0.1  0.0 - 0.5 % Final   • Neutrophils, Absolute 01/07/2020 4.34  1.70 - 7.00 10*3/mm3 Final   • Lymphocytes, Absolute 01/07/2020 1.79  0.70 - 3.10 10*3/mm3 Final   • Monocytes, Absolute 01/07/2020 0.65  0.10 - 0.90 10*3/mm3 Final   • Eosinophils, Absolute 01/07/2020 0.23  0.00 - 0.40 10*3/mm3 Final   • Basophils, Absolute 01/07/2020 0.02  0.00 - 0.20 10*3/mm3 Final   • Immature Grans, Absolute 01/07/2020 0.01  0.00 - 0.05 10*3/mm3 Final   • nRBC 01/07/2020 0.0  0.0 - 0.2 /100 WBC Final       1. Elevated prolactin level (CMS/HCC)    2. Muscle spasm of both lower legs    3. Fatigue, unspecified type        Assessment/Plan      Problem List Items Addressed This Visit        Musculoskeletal and Integument    Muscle spasm of both lower legs       Other    Elevated prolactin level (CMS/HCC) - Primary    Relevant Medications    cosyntropin (CORTROSYN) injection 0.25 mg (Completed)    Other Relevant Orders    ACTH (Completed)    Cortisol (3 Specimens)    Insulin-like Growth Factor (Completed)    Prolactin (Completed)    T4, Free (Completed)    TSH (Completed)    Testosterone (Completed)    Luteinizing Hormone (Completed)    Comprehensive Metabolic Panel (Completed)    Osmolality, Serum (Completed)    Cortisol (Completed)    Cortisol (Completed)    Cortisol (Completed)      Other Visit Diagnoses     Fatigue, unspecified type                PLAN  -  Pituitary panel obtained. Patient had several borderline elevated levels on the prolactin, likely related to his medications. Repeat labs today are normal.   The rest of the pituitary panels showed normal levels.   Cortrosyn  stim test showed slightly suboptimal response with stimulation to 17.9. We may have to repeat it again. He had steroid injection to the elbow 2 months ago and it could contribute to the abnormal test.   -Follow-up in 6 months and reassess the clinical status, may repeat labs.   It appears that his symptoms are related to the multiple medical conditions: knee and back problems, seizure disorder and other med conditions.     - diagnosis was discussed with the patient, and his questions were answered.     - old records reviewed and summarized in the HPI portion of the note.

## 2020-01-23 LAB — ACTH PLAS-MCNC: 32.5 PG/ML (ref 7.2–63.3)

## 2020-01-24 LAB — IGF-I SERPL-MCNC: 200 NG/ML (ref 67–205)

## 2020-01-27 RX ORDER — PHENYTOIN SODIUM 100 MG/1
CAPSULE, EXTENDED RELEASE ORAL
Qty: 120 CAPSULE | Refills: 5 | Status: SHIPPED | OUTPATIENT
Start: 2020-01-27 | End: 2020-06-10 | Stop reason: SDUPTHER

## 2020-02-05 ENCOUNTER — TRANSCRIBE ORDERS (OUTPATIENT)
Dept: PHYSICAL THERAPY | Facility: CLINIC | Age: 50
End: 2020-02-05

## 2020-02-05 DIAGNOSIS — S82.839A AVULSION FRACTURE OF DISTAL FIBULA: Primary | ICD-10-CM

## 2020-02-10 ENCOUNTER — TELEPHONE (OUTPATIENT)
Dept: ENDOCRINOLOGY | Facility: CLINIC | Age: 50
End: 2020-02-10

## 2020-02-10 NOTE — TELEPHONE ENCOUNTER
Attempted to contact patient to inform of lab result notes as ordered by provider and listed below; No answer; Left voicemail for patient to return call with office number given.     ----- Message from Linda ARAGON MD sent at 1/24/2020  4:39 PM EST -----  Please call the patient regarding his lab results. Pituitary levels, including prolactin are normal. Cortisol stimulation test showed mildly suboptimal response - it is likely related to the steroid injection you had few months ago. I do not see any endocrine abnormalities to explain your symptoms at this time. I can see him back in 6-8 months to follow on the symptoms and recheck some of the labs.

## 2020-02-12 ENCOUNTER — TREATMENT (OUTPATIENT)
Dept: PHYSICAL THERAPY | Facility: CLINIC | Age: 50
End: 2020-02-12

## 2020-02-12 DIAGNOSIS — S82.61XD CLOSED AVULSION FRACTURE OF LATERAL MALLEOLUS OF RIGHT FIBULA WITH ROUTINE HEALING, SUBSEQUENT ENCOUNTER: ICD-10-CM

## 2020-02-12 DIAGNOSIS — S82.831D CLOSED FRACTURE OF DISTAL END OF RIGHT FIBULA WITH ROUTINE HEALING, UNSPECIFIED FRACTURE MORPHOLOGY, SUBSEQUENT ENCOUNTER: Primary | ICD-10-CM

## 2020-02-12 PROCEDURE — 97162 PT EVAL MOD COMPLEX 30 MIN: CPT | Performed by: PHYSICAL THERAPIST

## 2020-02-12 NOTE — PROGRESS NOTES
"  Physical Therapy Initial Evaluation and Plan of Care      Patient: Jaquan Mckay   : 1970  Diagnosis/ICD-10 Code:  Closed fracture of distal end of right fibula with routine healing, unspecified fracture morphology, subsequent encounter [S82.831D]  Referring practitioner: Khurram Florez MD  Date of Initial Visit: 2020  Today's Date: 2020  Patient seen for 1 sessions           Subjective Questionnaire: LEFS: 41/80      Subjective Evaluation    History of Present Illness  Date of onset: 2019  Mechanism of injury: Patient reports that he was injured on 2019.  He notes that he was playing basketball.  He states that when he made a sharp turn, his right ankle \"snapped.\"  He went to the ER and was diagnosed with a right distal fibula fracture with medial avulsion to the malleolus.  He notes that he saw an Orthopedic MD; he was placed in an ankle brace, but chooses to use a walking boot, because the \"ankle brace hurts\".  He notes that he experiences a lot of swelling in the foot.  He states that the foot is also very painful, especially with weightbearing.  Patient reports that he has noticed that he \"loses feeling in the foot if he sits down for long periods of time\", but notes that he can feel his foot the remainder of the time.    Pain  Current pain ratin  At best pain ratin  At worst pain rating: 10  Location: R) foot  Quality: sharp and knife-like  Relieving factors: rest  Aggravating factors: ambulation, stairs, standing and movement    Diagnostic Tests  X-ray: abnormal (R) distal fibula fracture, medial avulsion to malleolus)    Patient Goals  Patient goals for therapy: decreased edema, decreased pain and increased motion         Visit Diagnoses:    ICD-10-CM ICD-9-CM   1. Closed fracture of distal end of right fibula with routine healing, unspecified fracture morphology, subsequent encounter S82.831D V54.16   2. Closed avulsion fracture of lateral malleolus of right fibula " with routine healing, subsequent encounter S82.61XD V54.19           Objective       Palpation     Additional Palpation Details  Tenderness reported throughout the entire right foot/ankle with palpation.    Active Range of Motion   Left Ankle/Foot   Dorsiflexion (ke): 10 degrees   Plantar flexion: 52 degrees   Inversion: 34 degrees   Eversion: 15 degrees     Right Ankle/Foot   Plantar flexion: 42 degrees   Inversion: 12 degrees   Eversion: 4 degrees     Additional Active Range of Motion Details  Dorsiflexion-lacking 27 degrees from neutral position    Strength/Myotome Testing     Left Hip   Planes of Motion   Flexion: 4    Right Hip   Planes of Motion   Flexion: 4    Left Knee   Flexion: 4-  Extension: 4    Right Knee   Flexion: 4-  Extension: 4    Left Ankle/Foot   Dorsiflexion: 4+  Plantar flexion: 4+  Inversion: 4+  Eversion: 4+    Right Ankle/Foot   Dorsiflexion: 2+  Plantar flexion: 3-  Inversion: 3-  Eversion: 3-    Swelling   Left Ankle/Foot   Metatarsal heads: 62 cm    Right Ankle/Foot   Metatarsal heads: 63 cm         Assessment & Plan     Assessment  Impairments: abnormal gait, abnormal or restricted ROM, activity intolerance, impaired balance, impaired physical strength, lacks appropriate home exercise program, pain with function and weight-bearing intolerance  Assessment details: Patient is a 49 year old male who comes to physical therapy for right distal fibular fracture with medial avulsion to malleolus. The patient presents with increased pain, decreased right ankle ROM, and decreased LE strength. Patient reports a 48.75% functional mobility impairment, based on the patient's response to the LEFS.  Patient will benefit from skilled PT, so that patient can achieve maximum level of function.     Prognosis: good  Functional Limitations: sleeping, walking, pulling, pushing, uncomfortable because of pain, sitting, standing and stooping  Goals  Plan Goals: SHORT TERM GOALS:     4 weeks  1. Patient will be  independent/compliant with HEP.   2.right ankle plantarflexion/dorsiflexion ROM will improve by at least 5 degrees to allow for greater ease with ADLs.  3. Patient will demonstrate ability to ambulate in the clinic with equal weight shift.  4. Patient will report right ankle pain no greater than 7/10 when performing self-care activities.    LONG TERM GOALS:   12 weeks  1. Patient will achieve within 3 degrees of unaffected ankle to allow for greater ease with daily tasks.  2. Patient to demonstrate ability to ambulate 10 minutes with normal gait pattern with pain no greater than 5/10 for improved community involvement.  3. Patient will report at least 55/80 on LEFS to show improved functional mobility.  4. right ankle strength will improve to at least 4/5 to prevent reinjury.      Plan  Therapy options: will be seen for skilled physical therapy services  Planned modality interventions: iontophoresis, cryotherapy, electrical stimulation/Russian stimulation, TENS, thermotherapy (hydrocollator packs) and ultrasound  Planned therapy interventions: manual therapy, balance/weight-bearing training, neuromuscular re-education, body mechanics training, postural training, soft tissue mobilization, flexibility, strengthening, gait training, stretching, home exercise program, therapeutic activities and joint mobilization  Duration in visits: 20  Duration in weeks: 12  Treatment plan discussed with: patient  Plan details: Moderate Evaluation  52322  Re-evaluation   41979    Therapeutic exercise  62573  Therapeutic activity    00153  Neuromuscular re-education   43088  Manual therapy   56052  Gait training  83209    Attended e-stim  84968  Unattended e-stim (Private)  63581  Unattended e-stim (Medicaid/Medicare)     Moist heat/cryotherapy 11783   Ultrasound   41732  Iontophoresis   65210              Timed:  Manual Therapy:         mins  24470;  Therapeutic Exercise:         mins  73430;     Neuromuscular Jazmin:        mins   05266;    Therapeutic Activity:          mins  16466;     Gait Training:           mins  17298;     Ultrasound:          mins  58134;    Electrical Stimulation:         mins  42579 ( );    Untimed:  Electrical Stimulation:         mins  42201 ( );  Mechanical Traction:         mins  08101;     Timed Treatment:      mins   Total Treatment:     30   mins    PT SIGNATURE: Hanna Burks, PT   DATE TREATMENT INITIATED: 2/12/2020    Initial Certification  Certification Period: 5/12/2020  I certify that the therapy services are furnished while this patient is under my care.  The services outlined above are required by this patient, and will be reviewed every 90 days.     PHYSICIAN: Khurram Florez MD      DATE:     Please sign and return via fax to [phone].. Thank you, [facility].

## 2020-02-13 ENCOUNTER — OFFICE VISIT (OUTPATIENT)
Dept: FAMILY MEDICINE CLINIC | Facility: CLINIC | Age: 50
End: 2020-02-13

## 2020-02-13 VITALS
WEIGHT: 227 LBS | BODY MASS INDEX: 35.63 KG/M2 | SYSTOLIC BLOOD PRESSURE: 130 MMHG | DIASTOLIC BLOOD PRESSURE: 84 MMHG | HEART RATE: 69 BPM | OXYGEN SATURATION: 98 % | TEMPERATURE: 97.6 F | HEIGHT: 67 IN

## 2020-02-13 DIAGNOSIS — S82.891S CLOSED FRACTURE OF RIGHT ANKLE, SEQUELA: Primary | ICD-10-CM

## 2020-02-13 DIAGNOSIS — K21.9 GASTROESOPHAGEAL REFLUX DISEASE WITHOUT ESOPHAGITIS: ICD-10-CM

## 2020-02-13 DIAGNOSIS — J45.40 MODERATE PERSISTENT ASTHMA WITHOUT COMPLICATION: ICD-10-CM

## 2020-02-13 DIAGNOSIS — F41.1 GENERALIZED ANXIETY DISORDER: ICD-10-CM

## 2020-02-13 DIAGNOSIS — M94.262 CHONDROMALACIA OF LEFT KNEE: ICD-10-CM

## 2020-02-13 DIAGNOSIS — S60.511S: ICD-10-CM

## 2020-02-13 DIAGNOSIS — G89.29 CHRONIC BILATERAL LOW BACK PAIN WITH LEFT-SIDED SCIATICA: ICD-10-CM

## 2020-02-13 DIAGNOSIS — M54.42 CHRONIC BILATERAL LOW BACK PAIN WITH LEFT-SIDED SCIATICA: ICD-10-CM

## 2020-02-13 PROCEDURE — 99214 OFFICE O/P EST MOD 30 MIN: CPT | Performed by: NURSE PRACTITIONER

## 2020-02-13 RX ORDER — IBUPROFEN 800 MG/1
TABLET ORAL
COMMUNITY
Start: 2020-01-08 | End: 2020-11-03

## 2020-02-13 RX ORDER — HYDROCODONE BITARTRATE AND ACETAMINOPHEN 10; 325 MG/1; MG/1
1 TABLET ORAL 2 TIMES DAILY PRN
Qty: 60 TABLET | Refills: 0 | Status: SHIPPED | OUTPATIENT
Start: 2020-02-13 | End: 2020-03-12 | Stop reason: SDUPTHER

## 2020-02-13 RX ORDER — CYCLOBENZAPRINE HCL 10 MG
TABLET ORAL
COMMUNITY
Start: 2020-01-27 | End: 2020-04-14

## 2020-02-13 RX ORDER — ERGOCALCIFEROL 1.25 MG/1
CAPSULE ORAL
COMMUNITY
Start: 2020-01-08 | End: 2020-02-13 | Stop reason: SDUPTHER

## 2020-02-13 RX ORDER — ERGOCALCIFEROL 1.25 MG/1
50000 CAPSULE ORAL
Qty: 5 CAPSULE | Refills: 5 | Status: SHIPPED | OUTPATIENT
Start: 2020-02-13 | End: 2020-07-09 | Stop reason: SDUPTHER

## 2020-02-13 RX ORDER — BUDESONIDE AND FORMOTEROL FUMARATE DIHYDRATE 160; 4.5 UG/1; UG/1
2 AEROSOL RESPIRATORY (INHALATION) 2 TIMES DAILY
Qty: 10.2 G | Refills: 5 | Status: SHIPPED | OUTPATIENT
Start: 2020-02-13 | End: 2020-04-14

## 2020-02-13 RX ORDER — FAMOTIDINE 40 MG/1
40 TABLET, FILM COATED ORAL NIGHTLY PRN
COMMUNITY
Start: 2020-01-27 | End: 2020-07-21

## 2020-02-13 RX ORDER — PANTOPRAZOLE SODIUM 40 MG/1
40 TABLET, DELAYED RELEASE ORAL DAILY
Qty: 30 TABLET | Refills: 5 | Status: SHIPPED | OUTPATIENT
Start: 2020-02-13 | End: 2020-03-23

## 2020-02-13 RX ORDER — POTASSIUM CHLORIDE 750 MG/1
TABLET, FILM COATED, EXTENDED RELEASE ORAL
COMMUNITY
Start: 2020-01-27 | End: 2020-06-10 | Stop reason: SDUPTHER

## 2020-02-13 RX ORDER — BUSPIRONE HYDROCHLORIDE 7.5 MG/1
7.5 TABLET ORAL 3 TIMES DAILY
Qty: 90 TABLET | Refills: 3 | Status: SHIPPED | OUTPATIENT
Start: 2020-02-13 | End: 2020-04-14 | Stop reason: DRUGHIGH

## 2020-02-13 NOTE — PROGRESS NOTES
"Subjective   Jaquan Mckay is a 49 y.o. male.     Chief Complaint   Patient presents with   • Leg Pain       History of Present Illness     Right leg and foot pain-after an ankle fracture.  Ongoing.  Is now in a soft support that he has to wear \"when I am up walking\".  He reports he has noted some increase in heel pain and some radiation upward into his achilles.  He is under care of Dr Florez in [place].  He reports some foot and leg swelling.  He has been referred to PT for evaluation and treatment.  He reports foot cramps and \"My toes bend\".  He has been started on Vit D and will need a refill on the medication.  He request a shower chair with sliding seat due to his difficulty getting in and out of tub.  Ongoing.    Knee and back pain-has been exacerbated due to compensation for is foot fracture.  He is taking PRN Hydrocodone as directed.    Anxiety and insomnia-reports he is sleeping well with Mirtazapine but feels the buspirone is not helping his anxiety as much.  \"getting use to it\".  No negative side effects of either medication.  He reports he is sleeping 6-8 hours on most nights.  Is not waking with drowsiness.  Ongoing.     HTN-chronic and ongoing.  Has an upcoming cardiology appt.  No associated symptoms such as CP, SOA, HA, or dizziness.  Taking Lisinopril 20 mg as directed.  No negative side effects.      The following portions of the patient's history were reviewed and updated as appropriate: CC, ROS, allergies, current medications, past family history, past medical history, past social history, past surgical history and problem list.    Review of Systems   Constitutional: Negative for appetite change, fatigue and unexpected weight change.   HENT: Negative for congestion, ear pain, nosebleeds, postnasal drip, rhinorrhea, sore throat, trouble swallowing and voice change.    Eyes: Negative for pain and visual disturbance.        Wears glasses   Respiratory: Negative for cough, shortness of breath and " "wheezing.    Cardiovascular: Positive for leg swelling (right leg since ankle fracture). Negative for chest pain and palpitations.   Gastrointestinal: Negative for abdominal pain, blood in stool, constipation and diarrhea.   Endocrine: Negative for cold intolerance and polydipsia.   Genitourinary: Negative for difficulty urinating, flank pain and hematuria.   Musculoskeletal: Positive for arthralgias, back pain and gait problem. Negative for joint swelling and myalgias.   Skin: Positive for wound (top of right hand). Negative for color change and rash.   Allergic/Immunologic: Negative.    Neurological: Negative for dizziness, syncope, numbness and headaches.   Hematological: Negative.    Psychiatric/Behavioral: Negative for sleep disturbance (stable on meds) and suicidal ideas. The patient is nervous/anxious.    All other systems reviewed and are negative.      Objective     /84   Pulse 69   Temp 97.6 °F (36.4 °C) (Temporal)   Ht 170.2 cm (67\")   Wt 103 kg (227 lb)   SpO2 98%   BMI 35.55 kg/m²     Physical Exam   Constitutional: He is oriented to person, place, and time. He appears well-developed and well-nourished. No distress.   HENT:   Head: Normocephalic and atraumatic.   Right Ear: Hearing, tympanic membrane, external ear and ear canal normal.   Left Ear: Hearing, tympanic membrane, external ear and ear canal normal.   Nose: Nose normal. No mucosal edema or rhinorrhea.   Mouth/Throat: Oropharynx is clear and moist and mucous membranes are normal. No oropharyngeal exudate.   edentulous     Eyes: Pupils are equal, round, and reactive to light. Conjunctivae, EOM and lids are normal. No scleral icterus. Right eye exhibits normal extraocular motion and no nystagmus. Left eye exhibits normal extraocular motion and no nystagmus.   Neck: Normal range of motion. Neck supple. No JVD present. No tracheal tenderness present. Carotid bruit is not present. No thyromegaly present.   Cardiovascular: Normal rate, " regular rhythm, S1 normal, S2 normal, normal heart sounds and intact distal pulses.   No murmur heard.  Pulmonary/Chest: Effort normal and breath sounds normal. He has no wheezes. He exhibits no tenderness.   Abdominal: Soft. Bowel sounds are normal. He exhibits no mass. There is no hepatosplenomegaly. There is no tenderness.   Musculoskeletal: He exhibits tenderness.        Left knee: Tenderness found. Medial joint line and lateral joint line tenderness noted.        Right ankle: He exhibits decreased range of motion and swelling. He exhibits normal pulse. Tenderness. Lateral malleolus tenderness found. Achilles tendon exhibits pain.        Lumbar back: He exhibits decreased range of motion, tenderness, pain and spasm.        Hands:       Right lower leg: He exhibits tenderness and edema (mild non pitting).   Gait normal.   equal bilaterally. No muscular atrophy or flaccidity.   Lymphadenopathy:     He has no cervical adenopathy.        Right cervical: No superficial cervical adenopathy present.       Left cervical: No superficial cervical adenopathy present.   Neurological: He is alert and oriented to person, place, and time. He has normal strength and normal reflexes. He displays no tremor. No cranial nerve deficit or sensory deficit. He exhibits normal muscle tone. Coordination and gait normal.   Skin: Skin is warm and dry. Capillary refill takes less than 2 seconds. He is not diaphoretic. No cyanosis. No pallor. Nails show no clubbing.   Psychiatric: He has a normal mood and affect. His speech is normal and behavior is normal. Judgment and thought content normal. He is not actively hallucinating. Cognition and memory are normal. He is attentive.   Vitals reviewed.      Assessment/Plan     Problem List Items Addressed This Visit        Digestive    Gastroesophageal reflux disease without esophagitis    Relevant Medications    famotidine (PEPCID) 40 MG tablet    pantoprazole (PROTONIX) 40 MG EC tablet        Nervous and Auditory    Chronic bilateral low back pain with left-sided sciatica    Relevant Medications    HYDROcodone-acetaminophen (NORCO)  MG per tablet       Musculoskeletal and Integument    Chondromalacia, knee    Relevant Medications    HYDROcodone-acetaminophen (NORCO)  MG per tablet       Other    Generalized anxiety disorder    Overview     With insomnia         Relevant Medications    busPIRone (BUSPAR) 7.5 MG tablet      Other Visit Diagnoses     Closed fracture of right ankle, sequela    -  Primary    Relevant Medications    Misc. Devices (BATH/SHOWER SEAT) misc    Abrasion of right hand, sequela        Relevant Medications    mupirocin (BACTROBAN) 2 % ointment    Moderate persistent asthma without complication        Relevant Medications    budesonide-formoterol (SYMBICORT) 160-4.5 MCG/ACT inhaler          Patient's Body mass index is 35.55 kg/m². BMI is above normal parameters. Recommendations include: nutrition counseling.       Understands disease processes and need for medications.  Understands reasons for urgent and emergent care.  Patient (& family) verbalized agreement for treatment plan.     Emotional support and active listening provided.  Patient provided time to verbalize feelings.    [facility] #[id number] reviewed today and consistent.  Will refill prescribed controlled medication today.  Patient is aware they cannot receive narcotics from any other provider except if under care of pain management or speciality clinic.  Risk and benefits of medication use has been reviewed.  History and physical exam exhibit continued safe and appropriate use of controlled substances.  The patient is aware of the potential for addiction and dependence.  This patient has been made aware of the appropriate use of such medications, including potential risk of somnolence, limited ability to drive and / or work safely, and potential for overdose.  Patient understands not to take medication and drive  until they know how medication may affect their cognition/decision making.   It has also been made clear that these medications are for use by this patient only, without concomitant use of alcohol or other substances unless prescribed/advised by medical provider.  Patient understands they may be subject to UDS and pill counts at random.      Patient considered to be low risk for addiction due to use of single controlled medications.  Patient understands and accepts these risks.  Patient need for medication will be reassessed at each visit.  Doses will be adjusted according to patient need and findings.    Goal of TX: Patient will not have any adverse reactions of medication.  Patient will have reduction in back and joint pain symptoms with use of PRN Norco.    RTC 1 month, sooner if needed.           This document has been electronically signed by:  BALDOMERO Thao FNP-C Dragon disclaimer:  Much of this encounter note is an electronic transcription/translation of spoken language to printed text. The electronic translation of spoken language may permit erroneous, or at times, nonsensical words or phrases to be inadvertently transcribed; Although I have reviewed the note for such errors, some may still exist.

## 2020-02-19 ENCOUNTER — APPOINTMENT (OUTPATIENT)
Dept: GENERAL RADIOLOGY | Facility: HOSPITAL | Age: 50
End: 2020-02-19

## 2020-02-19 ENCOUNTER — TREATMENT (OUTPATIENT)
Dept: PHYSICAL THERAPY | Facility: CLINIC | Age: 50
End: 2020-02-19

## 2020-02-19 ENCOUNTER — HOSPITAL ENCOUNTER (EMERGENCY)
Facility: HOSPITAL | Age: 50
Discharge: HOME | End: 2020-02-20
Attending: EMERGENCY MEDICINE | Admitting: EMERGENCY MEDICINE

## 2020-02-19 DIAGNOSIS — R07.9 CHEST PAIN, UNSPECIFIED TYPE: Primary | ICD-10-CM

## 2020-02-19 DIAGNOSIS — R19.7 DIARRHEA, UNSPECIFIED TYPE: ICD-10-CM

## 2020-02-19 DIAGNOSIS — S82.61XD CLOSED AVULSION FRACTURE OF LATERAL MALLEOLUS OF RIGHT FIBULA WITH ROUTINE HEALING, SUBSEQUENT ENCOUNTER: ICD-10-CM

## 2020-02-19 DIAGNOSIS — S82.831D CLOSED FRACTURE OF DISTAL END OF RIGHT FIBULA WITH ROUTINE HEALING, UNSPECIFIED FRACTURE MORPHOLOGY, SUBSEQUENT ENCOUNTER: Primary | ICD-10-CM

## 2020-02-19 LAB
ALBUMIN SERPL-MCNC: 4.2 G/DL (ref 3.5–5.2)
ALBUMIN/GLOB SERPL: 1.1 G/DL
ALP SERPL-CCNC: 85 U/L (ref 39–117)
ALT SERPL W P-5'-P-CCNC: 21 U/L (ref 1–41)
ANION GAP SERPL CALCULATED.3IONS-SCNC: 10.3 MMOL/L (ref 5–15)
AST SERPL-CCNC: 26 U/L (ref 1–40)
BASOPHILS # BLD AUTO: 0.02 10*3/MM3 (ref 0–0.2)
BASOPHILS NFR BLD AUTO: 0.2 % (ref 0–1.5)
BILIRUB SERPL-MCNC: 0.4 MG/DL (ref 0.2–1.2)
BUN BLD-MCNC: 11 MG/DL (ref 6–20)
BUN/CREAT SERPL: 9.4 (ref 7–25)
CALCIUM SPEC-SCNC: 9.3 MG/DL (ref 8.6–10.5)
CHLORIDE SERPL-SCNC: 103 MMOL/L (ref 98–107)
CO2 SERPL-SCNC: 23.7 MMOL/L (ref 22–29)
CREAT BLD-MCNC: 1.17 MG/DL (ref 0.76–1.27)
D DIMER PPP FEU-MCNC: <0.27 MCGFEU/ML (ref 0–0.5)
D-LACTATE SERPL-SCNC: 2 MMOL/L (ref 0.5–2)
DEPRECATED RDW RBC AUTO: 44.7 FL (ref 37–54)
EOSINOPHIL # BLD AUTO: 0.07 10*3/MM3 (ref 0–0.4)
EOSINOPHIL NFR BLD AUTO: 0.6 % (ref 0.3–6.2)
ERYTHROCYTE [DISTWIDTH] IN BLOOD BY AUTOMATED COUNT: 12.6 % (ref 12.3–15.4)
GFR SERPL CREATININE-BSD FRML MDRD: 67 ML/MIN/1.73
GLOBULIN UR ELPH-MCNC: 3.7 GM/DL
GLUCOSE BLD-MCNC: 154 MG/DL (ref 65–99)
HCT VFR BLD AUTO: 47.7 % (ref 37.5–51)
HGB BLD-MCNC: 16.4 G/DL (ref 13–17.7)
HYPOCHROMIA BLD QL: NORMAL
IMM GRANULOCYTES # BLD AUTO: 0.03 10*3/MM3 (ref 0–0.05)
IMM GRANULOCYTES NFR BLD AUTO: 0.3 % (ref 0–0.5)
LARGE PLATELETS: NORMAL
LIPASE SERPL-CCNC: 30 U/L (ref 13–60)
LYMPHOCYTES # BLD AUTO: 1.36 10*3/MM3 (ref 0.7–3.1)
LYMPHOCYTES NFR BLD AUTO: 12.5 % (ref 19.6–45.3)
MAGNESIUM SERPL-MCNC: 1.6 MG/DL (ref 1.6–2.6)
MCH RBC QN AUTO: 33.1 PG (ref 26.6–33)
MCHC RBC AUTO-ENTMCNC: 34.4 G/DL (ref 31.5–35.7)
MCV RBC AUTO: 96.2 FL (ref 79–97)
MONOCYTES # BLD AUTO: 1.08 10*3/MM3 (ref 0.1–0.9)
MONOCYTES NFR BLD AUTO: 10 % (ref 5–12)
NEUTROPHILS # BLD AUTO: 8.28 10*3/MM3 (ref 1.7–7)
NEUTROPHILS NFR BLD AUTO: 76.4 % (ref 42.7–76)
NRBC BLD AUTO-RTO: 0 /100 WBC (ref 0–0.2)
PLATELET # BLD AUTO: 159 10*3/MM3 (ref 140–450)
PMV BLD AUTO: 10.8 FL (ref 6–12)
POTASSIUM BLD-SCNC: 3.8 MMOL/L (ref 3.5–5.2)
PROT SERPL-MCNC: 7.9 G/DL (ref 6–8.5)
RBC # BLD AUTO: 4.96 10*6/MM3 (ref 4.14–5.8)
SODIUM BLD-SCNC: 137 MMOL/L (ref 136–145)
TROPONIN T SERPL-MCNC: <0.01 NG/ML (ref 0–0.03)
TROPONIN T SERPL-MCNC: <0.01 NG/ML (ref 0–0.03)
WBC NRBC COR # BLD: 10.84 10*3/MM3 (ref 3.4–10.8)

## 2020-02-19 PROCEDURE — 96372 THER/PROPH/DIAG INJ SC/IM: CPT

## 2020-02-19 PROCEDURE — 99284 EMERGENCY DEPT VISIT MOD MDM: CPT

## 2020-02-19 PROCEDURE — 83735 ASSAY OF MAGNESIUM: CPT | Performed by: EMERGENCY MEDICINE

## 2020-02-19 PROCEDURE — 85007 BL SMEAR W/DIFF WBC COUNT: CPT | Performed by: EMERGENCY MEDICINE

## 2020-02-19 PROCEDURE — 93010 ELECTROCARDIOGRAM REPORT: CPT | Performed by: INTERNAL MEDICINE

## 2020-02-19 PROCEDURE — 84484 ASSAY OF TROPONIN QUANT: CPT | Performed by: EMERGENCY MEDICINE

## 2020-02-19 PROCEDURE — 85379 FIBRIN DEGRADATION QUANT: CPT | Performed by: EMERGENCY MEDICINE

## 2020-02-19 PROCEDURE — 83605 ASSAY OF LACTIC ACID: CPT | Performed by: EMERGENCY MEDICINE

## 2020-02-19 PROCEDURE — 85025 COMPLETE CBC W/AUTO DIFF WBC: CPT | Performed by: EMERGENCY MEDICINE

## 2020-02-19 PROCEDURE — 25010000002 DICYCLOMINE PER 20 MG: Performed by: EMERGENCY MEDICINE

## 2020-02-19 PROCEDURE — 71045 X-RAY EXAM CHEST 1 VIEW: CPT | Performed by: RADIOLOGY

## 2020-02-19 PROCEDURE — 36415 COLL VENOUS BLD VENIPUNCTURE: CPT

## 2020-02-19 PROCEDURE — 97110 THERAPEUTIC EXERCISES: CPT | Performed by: PHYSICAL THERAPIST

## 2020-02-19 PROCEDURE — 83690 ASSAY OF LIPASE: CPT | Performed by: EMERGENCY MEDICINE

## 2020-02-19 PROCEDURE — 80053 COMPREHEN METABOLIC PANEL: CPT | Performed by: EMERGENCY MEDICINE

## 2020-02-19 PROCEDURE — 93005 ELECTROCARDIOGRAM TRACING: CPT | Performed by: EMERGENCY MEDICINE

## 2020-02-19 PROCEDURE — 71045 X-RAY EXAM CHEST 1 VIEW: CPT

## 2020-02-19 RX ORDER — ASPIRIN 325 MG
325 TABLET ORAL ONCE
Status: COMPLETED | OUTPATIENT
Start: 2020-02-19 | End: 2020-02-19

## 2020-02-19 RX ORDER — DICYCLOMINE HYDROCHLORIDE 10 MG/ML
20 INJECTION INTRAMUSCULAR ONCE
Status: COMPLETED | OUTPATIENT
Start: 2020-02-19 | End: 2020-02-19

## 2020-02-19 RX ORDER — SODIUM CHLORIDE 0.9 % (FLUSH) 0.9 %
10 SYRINGE (ML) INJECTION AS NEEDED
Status: DISCONTINUED | OUTPATIENT
Start: 2020-02-19 | End: 2020-02-20 | Stop reason: HOSPADM

## 2020-02-19 RX ORDER — ONDANSETRON 4 MG/1
4 TABLET, ORALLY DISINTEGRATING ORAL EVERY 6 HOURS PRN
Qty: 30 TABLET | Refills: 0 | Status: SHIPPED | OUTPATIENT
Start: 2020-02-19 | End: 2020-04-20 | Stop reason: ALTCHOICE

## 2020-02-19 RX ADMIN — SODIUM CHLORIDE 1000 ML: 9 INJECTION, SOLUTION INTRAVENOUS at 22:23

## 2020-02-19 RX ADMIN — DICYCLOMINE HYDROCHLORIDE 20 MG: 20 INJECTION, SOLUTION INTRAMUSCULAR at 22:24

## 2020-02-19 RX ADMIN — SODIUM CHLORIDE 1000 ML: 9 INJECTION, SOLUTION INTRAVENOUS at 21:10

## 2020-02-19 RX ADMIN — ASPIRIN 325 MG: 325 TABLET ORAL at 22:23

## 2020-02-19 NOTE — PROGRESS NOTES
Physical Therapy Daily Progress Note      Patient: Jaquan Mckay   : 1970  Referring practitioner: Khurram Florez MD  Date of Initial Visit: Type: THERAPY  Noted: 2020  Today's Date: 2020  Patient seen for 2 sessions           Subjective Evaluation    History of Present Illness    Subjective comment: Patient notes that he has 8/10 pain today.Pain  Current pain ratin (pre and post tx)           Objective   See Exercise, Manual, and Modality Logs for complete treatment.       Assessment & Plan     Assessment  Assessment details: Therapy session consisted of MH and there ex.  No redness was noted with moist heat.  There ex focused on stretching, ROM, and intrinsic strengthening.  Patient was provided with cues to ensure correct form with exercises.  Education was provided to patient on benefits of there ex.  Patient reported no change in pain following therapy session, noting 8/10 pain pre- and post-tx.  He will continue to be progressed per his tolerance and POC.        Visit Diagnoses:    ICD-10-CM ICD-9-CM   1. Closed fracture of distal end of right fibula with routine healing, unspecified fracture morphology, subsequent encounter S82.831D V54.16   2. Closed avulsion fracture of lateral malleolus of right fibula with routine healing, subsequent encounter S82.61XD V54.19       Progress per Plan of Care and Progress strengthening /stabilization /functional activity           Timed:  Manual Therapy:         mins  36515;  Therapeutic Exercise:    28     mins  77698;     Neuromuscular Jazmin:        mins  77950;    Therapeutic Activity:          mins  53138;     Gait Training:           mins  00375;     Ultrasound:          mins  60728;    Electrical Stimulation:         mins  00791 ( );    Untimed:  Electrical Stimulation:         mins  06864 ( );  Mechanical Traction:         mins  10576;     Timed Treatment:   28   mins   Total Treatment:    38    mins  Hanna Burks,  PT  Physical Therapist

## 2020-02-20 VITALS
OXYGEN SATURATION: 100 % | RESPIRATION RATE: 18 BRPM | BODY MASS INDEX: 34.21 KG/M2 | HEIGHT: 67 IN | DIASTOLIC BLOOD PRESSURE: 83 MMHG | WEIGHT: 218 LBS | HEART RATE: 80 BPM | SYSTOLIC BLOOD PRESSURE: 138 MMHG | TEMPERATURE: 98.4 F

## 2020-02-20 NOTE — ED NOTES
Patient is NSR on the cardiac monitor at 99 bpm. Cardiac monitoring strips printed and placed on the chart.     Shelby Benito RN  02/19/20 8689

## 2020-02-20 NOTE — ED NOTES
Patient A&Ox4, ambulatory on d/c. Patient condition had improved. No longer experiencing chest pain or nausea. VS stable. Pt verbalized understanding importance of medication compliance as well as follow-up care.     02/20/20 0011   Vital Signs   Temp 98.4 °F (36.9 °C)   Heart Rate 80   Resp 18   /83   Noninvasive MAP (mmHg) 102   Oxygen Therapy   SpO2 100 %   Vitals Timer   Restart Vitals Timer Yes   Restart Vitals Timer Yes        Shelby Benito, RN  02/20/20 0021

## 2020-02-20 NOTE — ED PROVIDER NOTES
"Subjective   49-year-old male presents with complaints of chest pain and diarrhea.  States he has had some intermittent sharp pains in his chest throughout the day today.  No other associated thoracic symptoms.  No fevers or cough.  He has had diarrhea multiple times today.  It started after he ate breakfast at [email or web address].  No vomiting or other symptoms.  He has no apparent history of cardiac disease or COPD.  He had a negative cath in 2018.  He does have history of provoked DVT.      History provided by:  Patient   used: No        Review of Systems   Constitutional: Negative.  Negative for fever.   HENT: Negative.    Eyes: Negative.    Respiratory: Negative.  Negative for shortness of breath.    Cardiovascular: Positive for chest pain.   Gastrointestinal: Positive for diarrhea. Negative for abdominal pain.   Genitourinary: Negative.  Negative for dysuria.   Musculoskeletal: Negative.    Skin: Negative.    Neurological: Negative.    Psychiatric/Behavioral: Negative.    All other systems reviewed and are negative.      Past Medical History:   Diagnosis Date   • Allergic    • Anxiety    • Arthritis    • Asthma    • Body piercing     REPORTS CYLICONE IN EARS   • Clotting disorder (CMS/[facility]) 2004    had a knee surgery   • Depression    • DVT (deep venous thrombosis) (CMS/[facility])     RIGHT RIGHT KNEE AFTER SURGERY YEARS AGO IN 2001 OR 2004   • Gastric ulcer    • GERD (gastroesophageal reflux disease)    • H/O migraine    • H/O sleep apnea     REPORTS DIAGNOSED WITH SLEEP APNEA AND COULDN'T STAND TO WEAR THE MACHINE   • Headache    • Heart attack (CMS/[facility])     REPORTS \"LIGHT HEART ATTACK A LONG TIME AGO\"  \"EARLY 90'S\"   • History of seizures     REPORTS LAST EPISODE WAS AROUND 1995.   • Hostility    • Hyperlipidemia    • Hypertension    • Knee pain, acute     Left   • Low back pain    • Migraine    • MRSA (methicillin resistant Staphylococcus aureus)     REPORTS LAST TESTED + 2004. WAS TREATED HE REPORTS.  " "RIGHT ARM, RIGHT KNEE.   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    • Obesity    • Poor historian    • [place] spotted fever    • Seizures (CMS/HCC)    • Tattoo    • Wears glasses        Allergies   Allergen Reactions   • Ciprofloxacin Anaphylaxis and Hives   • Mobic [Meloxicam] Other (See Comments)     Pt states, \"It make my feet and hands go numb and I can't hardly walk.\"    • Paxil [Paroxetine Hcl] Shortness Of Breath     Chest pain    • Peanut-Containing Drug Products Anaphylaxis   • Penicillins Anaphylaxis   • Pristiq [Desvenlafaxine Succinate Er] Dizziness   • Sulfa Antibiotics Anaphylaxis, Itching and Rash   • Doxycycline Hives   • Fish-Derived Products Hives   • Isosorbide Nitrate Rash     Rash, hives, had to use inhaler.    • Clarithromycin Rash   • Clindamycin/Lincomycin Rash   • Contrast Dye Itching and Rash   • Diltiazem Rash   • Gabapentin Rash   • Keflex [Cephalexin] Rash   • Metoprolol Rash   • Prednisone Rash and Other (See Comments)     Face, feet, and legs go completely numb per patient   • Robitussin Cough+ Chest Max St [Dextromethorphan-Guaifenesin] Itching   • Shrimp (Diagnostic) Rash   • Spironolactone Rash   • Viibryd [Vilazodone Hcl] Itching and Rash   • Zoloft [Sertraline Hcl] Hives and Itching       Past Surgical History:   Procedure Laterality Date   • BACK SURGERY     • BRAIN SURGERY  1986    Tumor removal    • CARDIAC CATHETERIZATION N/A 9/28/2018    Procedure: Left Heart Cath;  Surgeon: Leandro Daily MD;  Location:  [facility];  Service: Cardiology   • CHOLECYSTECTOMY     • CYST REMOVAL     • KNEE ARTHROSCOPY Left 10/20/2017    Procedure: Diagnostic arthroscopy left knee with chondroplasty;  Surgeon: Marco Aguirre MD;  Location: [facility] OR;  Service:    • KNEE SURGERY Right    • MOUTH SURGERY      FULL MOUTH EXTRACTION   • OTHER SURGICAL HISTORY      REPORTS 7 TICKS REMOVED FROM RIGHT ARM IN 2001 OR 2002   • TUMOR EXCISION      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 "       Family History   Problem Relation Age of Onset   • Diabetes Mother    • Hypertension Mother    • Stroke Mother    • Diabetes Father    • Skin cancer Father    • Hypertension Father    • Heart attack Father    • Diabetes Brother    • Hypertension Brother    • Heart disease Maternal Aunt    • Heart disease Maternal Uncle    • Heart disease Paternal Aunt    • Heart disease Paternal Uncle    • Heart disease Maternal Grandmother    • Heart disease Maternal Grandfather    • Heart disease Paternal Grandmother    • Heart disease Paternal Grandfather        Social History     Socioeconomic History   • Marital status:      Spouse name: Becca   • Number of children: 2   • Years of education: 12   • Highest education level: Not on file   Occupational History   • Occupation: DISABLED   Social Needs   • Financial resource strain: Somewhat hard   • Food insecurity:     Worry: Sometimes true     Inability: Sometimes true   • Transportation needs:     Medical: No     Non-medical: No   Tobacco Use   • Smoking status: Current Every Day Smoker     Packs/day: 1.50     Years: 17.00     Pack years: 25.50     Types: Cigars, Cigarettes     Start date: 5/5/2010   • Smokeless tobacco: Never Used   Substance and Sexual Activity   • Alcohol use: No   • Drug use: No   • Sexual activity: Defer   Lifestyle   • Physical activity:     Days per week: 0 days     Minutes per session: 0 min   • Stress: To some extent   Relationships   • Social connections:     Talks on phone: Once a week     Gets together: Once a week     Attends Jain service: Never     Active member of club or organization: No     Attends meetings of clubs or organizations: Never     Relationship status:            Objective   Physical Exam   Constitutional: He is oriented to person, place, and time. He appears well-developed and well-nourished. No distress.   HENT:   Head: Normocephalic and atraumatic.   Right Ear: External ear normal.   Left Ear:  External ear normal.   Nose: Nose normal.   Eyes: Pupils are equal, round, and reactive to light. Conjunctivae and EOM are normal.   Neck: Normal range of motion. Neck supple. No JVD present. No tracheal deviation present.   Cardiovascular: Normal rate, regular rhythm, normal heart sounds and intact distal pulses.   No murmur heard.  Pulmonary/Chest: Effort normal and breath sounds normal. No respiratory distress. He has no wheezes.   Abdominal: Soft. Bowel sounds are normal. There is no tenderness.   Musculoskeletal: Normal range of motion. He exhibits no edema or deformity.   Neurological: He is alert and oriented to person, place, and time. No cranial nerve deficit.   Skin: Skin is warm and dry. No rash noted. He is not diaphoretic. No erythema. No pallor.   Psychiatric: He has a normal mood and affect. His behavior is normal. Thought content normal.   Nursing note and vitals reviewed.      Procedures       EKG: unchanged from previous tracings, sinus tachycardia, no ischemia, otherwise normal EKG.      ED Course                                           MDM  Number of Diagnoses or Management Options  Chest pain, unspecified type:   Diarrhea, unspecified type:   Diagnosis management comments: 49-year-old male presenting with complaint of chest pain and diarrhea.  No emergent conditions identified.  No evidence for ACS or PE.  Labs and chest x-ray otherwise unremarkable.  His abdominal exam is benign and he is tolerating oral intake.  I see no indications for further imaging and will treat symptomatically at this time.  Strict return precautions discussed.       Amount and/or Complexity of Data Reviewed  Clinical lab tests: ordered and reviewed  Tests in the radiology section of CPT®: ordered and reviewed  Independent visualization of images, tracings, or specimens: yes        Final diagnoses:   Chest pain, unspecified type   Diarrhea, unspecified type            Leonides Samaniego MD  02/20/20 [id number]

## 2020-02-21 ENCOUNTER — TREATMENT (OUTPATIENT)
Dept: PHYSICAL THERAPY | Facility: CLINIC | Age: 50
End: 2020-02-21

## 2020-02-21 DIAGNOSIS — S82.831D CLOSED FRACTURE OF DISTAL END OF RIGHT FIBULA WITH ROUTINE HEALING, UNSPECIFIED FRACTURE MORPHOLOGY, SUBSEQUENT ENCOUNTER: Primary | ICD-10-CM

## 2020-02-21 DIAGNOSIS — S82.61XD CLOSED AVULSION FRACTURE OF LATERAL MALLEOLUS OF RIGHT FIBULA WITH ROUTINE HEALING, SUBSEQUENT ENCOUNTER: ICD-10-CM

## 2020-02-21 PROCEDURE — 97110 THERAPEUTIC EXERCISES: CPT | Performed by: PHYSICAL THERAPIST

## 2020-02-21 NOTE — PROGRESS NOTES
Patient: Jaquan Mckay   : 1970  Referring practitioner: Khurram Florez MD  Date of Initial Visit: Type: THERAPY  Noted: 2020  Today's Date: 2020  Patient seen for 3 sessions           Subjective Evaluation    History of Present Illness    Subjective comment: Pt reports having 8/10 pain today       Objective   See Exercise, Manual, and Modality Logs for complete treatment.       Assessment & Plan     Assessment  Assessment details: Tx today consisted of mh to right ankle followed by there ex for improved mobility and ended with ice.  Pt responded well to tx with 6/10 pain following session.  Pt cont to report sensitivity.     Plan  Plan details: Will follow for improved ankle stability and decreased pain.        Visit Diagnoses:    ICD-10-CM ICD-9-CM   1. Closed fracture of distal end of right fibula with routine healing, unspecified fracture morphology, subsequent encounter S82.831D V54.16   2. Closed avulsion fracture of lateral malleolus of right fibula with routine healing, subsequent encounter S82.61XD V54.19       Progress strengthening /stabilization /functional activity           Timed:  Manual Therapy:         mins  69738;  Therapeutic Exercise:  28       mins  71672;     Neuromuscular Jazmin:        mins  41666;    Therapeutic Activity:          mins  90599;     Gait Training:           mins  63828;     Ultrasound:          mins  03430;    Electrical Stimulation:        mins  90254 ( );    Untimed:  Electrical Stimulation:         mins  14834 ( );  Mechanical Traction:         mins  27375;     Timed Treatment:   28   mins   Total Treatment:    44    Mins(10 min mh and 6 min ice)  Win Rodriguez, PT  Physical Therapist

## 2020-02-26 ENCOUNTER — TREATMENT (OUTPATIENT)
Dept: PHYSICAL THERAPY | Facility: CLINIC | Age: 50
End: 2020-02-26

## 2020-02-26 DIAGNOSIS — S82.831D CLOSED FRACTURE OF DISTAL END OF RIGHT FIBULA WITH ROUTINE HEALING, UNSPECIFIED FRACTURE MORPHOLOGY, SUBSEQUENT ENCOUNTER: Primary | ICD-10-CM

## 2020-02-26 DIAGNOSIS — S82.61XD CLOSED AVULSION FRACTURE OF LATERAL MALLEOLUS OF RIGHT FIBULA WITH ROUTINE HEALING, SUBSEQUENT ENCOUNTER: ICD-10-CM

## 2020-02-26 PROCEDURE — 97110 THERAPEUTIC EXERCISES: CPT | Performed by: PHYSICAL THERAPIST

## 2020-02-26 PROCEDURE — 97140 MANUAL THERAPY 1/> REGIONS: CPT | Performed by: PHYSICAL THERAPIST

## 2020-02-26 NOTE — PROGRESS NOTES
Patient: Jaquan Mckay   : 1970  Referring practitioner: Khurram Florez MD  Date of Initial Visit: Type: THERAPY  Noted: 2020  Today's Date: 2020  Patient seen for 4 sessions           Subjective Evaluation    History of Present Illness    Subjective comment: Patient arrives to therapy w/ reports of 8/10 right ankle pain.  Pt states he had a fall at home yesterday b/c his foot was numb.  Pt reports of no injuries other than a scrape on his arm.  Pt reports ortho is aware, and he is scheduled to have a nerve test due to the numbness and symptoms he is having.         Objective   See Exercise, Manual, and Modality Logs for complete treatment.       Assessment & Plan     Assessment  Assessment details: Patient tolerated treatment fairly well today w/ reports of slight decrease in pain following, 7/10.  MH applied to pt's R) ankle/ foot f/b there ex as listed, and manual rx.  There ex performed w/ continued focus on improved ankle ROM, and strengthening, as tolerated, per protocol.  Manual therapy performed to assist w/ decreased edema and improved mobility.  Pt denies cryotherapy at conclusion.  There ex progressed w/ additional LE strengthening activities added to program; pt noted w/ no complaints. Pt will be progressed as tolerated.      Plan  Plan details: Continue w/ PT's POC.         Visit Diagnoses:    ICD-10-CM ICD-9-CM   1. Closed fracture of distal end of right fibula with routine healing, unspecified fracture morphology, subsequent encounter S82.831D V54.16   2. Closed avulsion fracture of lateral malleolus of right fibula with routine healing, subsequent encounter S82.61XD V54.19       Progress per Plan of Care and Progress strengthening /stabilization /functional activity           Timed:  Manual Therapy:   10      mins  71430;  Therapeutic Exercise:     29    mins  72912;     Neuromuscular Jazmin:        mins  44160;    Therapeutic Activity:          mins  32848;     Gait Training:            mins  29732;     Ultrasound:          mins  13498;    Electrical Stimulation:         mins  48369 ( );    Untimed:  Electrical Stimulation:         mins  27562 ( );  Mechanical Traction:         mins  59699;     Timed Treatment: 39     mins   Total Treatment:    49    mins  Leighann Brown. NEIL [place]  Physical Therapist

## 2020-02-28 ENCOUNTER — OFFICE VISIT (OUTPATIENT)
Dept: CARDIOLOGY | Facility: CLINIC | Age: 50
End: 2020-02-28

## 2020-02-28 ENCOUNTER — TREATMENT (OUTPATIENT)
Dept: PHYSICAL THERAPY | Facility: CLINIC | Age: 50
End: 2020-02-28

## 2020-02-28 VITALS
WEIGHT: 230.2 LBS | BODY MASS INDEX: 36.13 KG/M2 | OXYGEN SATURATION: 100 % | SYSTOLIC BLOOD PRESSURE: 140 MMHG | DIASTOLIC BLOOD PRESSURE: 80 MMHG | RESPIRATION RATE: 16 BRPM | HEART RATE: 90 BPM | HEIGHT: 67 IN

## 2020-02-28 DIAGNOSIS — R07.2 PRECORDIAL PAIN: ICD-10-CM

## 2020-02-28 DIAGNOSIS — I10 ESSENTIAL HYPERTENSION: Primary | ICD-10-CM

## 2020-02-28 DIAGNOSIS — S82.831D CLOSED FRACTURE OF DISTAL END OF RIGHT FIBULA WITH ROUTINE HEALING, UNSPECIFIED FRACTURE MORPHOLOGY, SUBSEQUENT ENCOUNTER: Primary | ICD-10-CM

## 2020-02-28 DIAGNOSIS — E78.5 DYSLIPIDEMIA: ICD-10-CM

## 2020-02-28 DIAGNOSIS — S82.61XD CLOSED AVULSION FRACTURE OF LATERAL MALLEOLUS OF RIGHT FIBULA WITH ROUTINE HEALING, SUBSEQUENT ENCOUNTER: ICD-10-CM

## 2020-02-28 PROCEDURE — 97110 THERAPEUTIC EXERCISES: CPT | Performed by: PHYSICAL THERAPIST

## 2020-02-28 PROCEDURE — 99213 OFFICE O/P EST LOW 20 MIN: CPT | Performed by: NURSE PRACTITIONER

## 2020-02-28 PROCEDURE — 97140 MANUAL THERAPY 1/> REGIONS: CPT | Performed by: PHYSICAL THERAPIST

## 2020-02-28 NOTE — PROGRESS NOTES
Tiera Valenzuela APRN  Jaquan Mckay  1970 02/28/2020    Patient Active Problem List   Diagnosis   • Gastroesophageal reflux disease without esophagitis   • Arthritis   • Hypertension   • Seizure disorder (CMS/HCC)   • Hyperlipidemia   • Anxiety   • Varicocele   • Varicocele present on ultrasound of scrotum   • Obesity (BMI 30.0-34.9)   • Chronic bilateral low back pain with left-sided sciatica   • Seasonal allergic rhinitis due to pollen   • Mild persistent asthma with acute exacerbation   • Precordial pain   • Dysuria   • Congenital coronary artery anomaly   • BMI 35.0-35.9,adult   • Chondromalacia, knee   • Achilles tendinitis of both lower extremities   • Muscle spasm of both lower legs   • Personal history of allergy to shellfish   • Sensation of cold in lower extremity   • Varicose vein of leg   • Heart palpitations   • Shortness of breath   • Generalized anxiety disorder   • Chronic elbow pain, right   • Chest pain   • Unstable angina (CMS/HCC)   • ASCVD (arteriosclerotic cardiovascular disease)   • Elevated prolactin level (CMS/[facility])   • Other constipation   • Class 1 obesity due to excess calories with serious comorbidity and body mass index (BMI) of 33.0 to 33.9 in adult   • Bacteremia       Dear Tiera Valenzuela APRN:    Subjective     Chief Complaint   Patient presents with   • Follow-up     cardiac management   • Chest Pain   • Rapid Heart Rate           History of Present Illness:    Jaquan Mckay is a 49 y.o. male with a history of hypertension, hyperlipidemia, chest pain and previous reports of syncope.  He presents today for routine cardiology follow-up.  He reports continued epigastric chest pains.  He notices these occur after eating certain foods or after GI illness.  He reports having an EGD several years ago which was abnormal.  Cardiac catheterization in 2018 was unremarkable.  VIJI in 2019 revealed normal EF with no significant valvular abnormality.  He has had reported syncope in the  "past.  However, he has chronically refused work-up for this including event monitor.          Allergies   Allergen Reactions   • Ciprofloxacin Anaphylaxis and Hives   • Mobic [Meloxicam] Other (See Comments)     Pt states, \"It make my feet and hands go numb and I can't hardly walk.\"    • Paxil [Paroxetine Hcl] Shortness Of Breath     Chest pain    • Peanut-Containing Drug Products Anaphylaxis   • Penicillins Anaphylaxis   • Pristiq [Desvenlafaxine Succinate Er] Dizziness   • Sulfa Antibiotics Anaphylaxis, Itching and Rash   • Doxycycline Hives   • Fish-Derived Products Hives   • Isosorbide Nitrate Rash     Rash, hives, had to use inhaler.    • Clarithromycin Rash   • Clindamycin/Lincomycin Rash   • Contrast Dye Itching and Rash   • Diltiazem Rash   • Gabapentin Rash   • Keflex [Cephalexin] Rash   • Metoprolol Rash   • Prednisone Rash and Other (See Comments)     Face, feet, and legs go completely numb per patient   • Robitussin Cough+ Chest Max St [Dextromethorphan-Guaifenesin] Itching   • Shrimp (Diagnostic) Rash   • Spironolactone Rash   • Viibryd [Vilazodone Hcl] Itching and Rash   • Zoloft [Sertraline Hcl] Hives and Itching   :      Current Outpatient Medications:   •  albuterol (PROVENTIL) (2.5 MG/3ML) 0.083% nebulizer solution, Take 2.5 mg by nebulization Every 4 (Four) Hours As Needed for Shortness of Air., Disp: 180 vial, Rfl: 5  •  albuterol sulfate  (90 Base) MCG/ACT inhaler, Inhale 2 puffs Every 4 (Four) Hours As Needed for Shortness of Air., Disp: 1 inhaler, Rfl: 5  •  ASPIRIN ADULT LOW STRENGTH 81 MG EC tablet, TAKE 1 TABLET BY MOUTH DAILY FOR HEART HEALTH AND CIRCULATION, Disp: 30 tablet, Rfl: 3  •  atorvastatin (LIPITOR) 40 MG tablet, Take 1 tablet by mouth Daily., Disp: 90 tablet, Rfl: 3  •  baclofen (LIORESAL) 10 MG tablet, Take 0.5-1 tablets by mouth 2 (Two) Times a Day., Disp: 60 tablet, Rfl: 2  •  Blood Pressure Monitoring (HEALTH SENSE BP MONITOR) device, 1 each Daily., Disp: 1 each, Rfl: " 0  •  budesonide-formoterol (SYMBICORT) 160-4.5 MCG/ACT inhaler, Inhale 2 puffs 2 (Two) Times a Day., Disp: 10.2 g, Rfl: 5  •  busPIRone (BUSPAR) 7.5 MG tablet, Take 1 tablet by mouth 3 (Three) Times a Day. Stop order for 5 mg, Disp: 90 tablet, Rfl: 3  •  cyclobenzaprine (FLEXERIL) 10 MG tablet, , Disp: , Rfl:   •  DILANTIN 100 MG ER capsule, TAKE 2 CAPSULES BY MOUTH EVERY 12 HOURS AS DIRECTED, Disp: 120 capsule, Rfl: 5  •  diphenhydrAMINE (BENADRYL ALLERGY) 25 MG tablet, Take 1 tablet by mouth Every 6 (Six) Hours As Needed for Itching or Allergies., Disp: 30 tablet, Rfl: 1  •  famotidine (PEPCID) 40 MG tablet, , Disp: , Rfl:   •  HYDROcodone-acetaminophen (NORCO)  MG per tablet, Take 1 tablet by mouth 2 (Two) Times a Day As Needed for Moderate Pain ., Disp: 60 tablet, Rfl: 0  •  ibuprofen (ADVIL,MOTRIN) 800 MG tablet, , Disp: , Rfl:   •  lisinopril (PRINIVIL,ZESTRIL) 20 MG tablet, Take 2 tablets by mouth Daily. FOR BLOOD PRESSURE, Disp: 60 tablet, Rfl: 5  •  loratadine (CLARITIN) 10 MG tablet, Take 1 tablet by mouth Daily As Needed for Allergies., Disp: 30 tablet, Rfl: 5  •  meclizine (ANTIVERT) 12.5 MG tablet, Take 1 tablet by mouth 3 (Three) Times a Day As Needed for dizziness., Disp: 30 tablet, Rfl: 0  •  Misc. Devices (BATH/SHOWER SEAT) misc, 1 each Daily., Disp: 1 each, Rfl: 0  •  montelukast (SINGULAIR) 10 MG tablet, Take 1 tablet by mouth Every Night., Disp: 30 tablet, Rfl: 5  •  mupirocin (BACTROBAN) 2 % ointment, Apply  topically to the appropriate area as directed 3 (Three) Times a Day., Disp: 30 g, Rfl: 0  •  ondansetron ODT (ZOFRAN-ODT) 4 MG disintegrating tablet, Take 1 tablet by mouth Every 6 (Six) Hours As Needed for Nausea or Vomiting., Disp: 30 tablet, Rfl: 0  •  pantoprazole (PROTONIX) 40 MG EC tablet, Take 1 tablet by mouth Daily., Disp: 30 tablet, Rfl: 5  •  phenytoin extended (DILANTIN) 200 MG ER capsule, Take 1 capsule by mouth Every 12 (Twelve) Hours. NEEDS BRAND NAME NOT GENERIC, Disp:  "60 capsule, Rfl: 0  •  potassium chloride (K-DUR) 10 MEQ CR tablet, , Disp: , Rfl:   •  vitamin D (ERGOCALCIFEROL) 1.25 MG (77479 UT) capsule capsule, Take 1 capsule by mouth Every 7 (Seven) Days. On Friday, Disp: 5 capsule, Rfl: 5  •  ketoconazole (NIZORAL) 2 % shampoo, Apply  topically to the appropriate area as directed 2 (Two) Times a Week. To scalp, Disp: 120 mL, Rfl: 5      The following portions of the patient's history were reviewed and updated as appropriate: allergies, current medications, past family history, past medical history, past social history, past surgical history and problem list.    Social History     Tobacco Use   • Smoking status: Current Every Day Smoker     Packs/day: 1.50     Years: 17.00     Pack years: 25.50     Types: Cigars, Cigarettes     Start date: 5/5/2010   • Smokeless tobacco: Never Used   • Tobacco comment: still uses 1.5 packs a day.   Substance Use Topics   • Alcohol use: No   • Drug use: No       Review of Systems   Constitution: Negative for decreased appetite and malaise/fatigue.   Cardiovascular: Positive for chest pain. Negative for dyspnea on exertion, irregular heartbeat, leg swelling, near-syncope, orthopnea, palpitations, paroxysmal nocturnal dyspnea and syncope.   Respiratory: Negative for cough, shortness of breath and wheezing.    Neurological: Negative for dizziness, light-headedness and weakness.       Objective   Vitals:    02/28/20 1226   BP: 140/80   BP Location: Right arm   Patient Position: Sitting   Pulse: 90   Resp: 16   SpO2: 100%   Weight: 104 kg (230 lb 3.2 oz)   Height: 170.2 cm (67\")     Body mass index is 36.05 kg/m².        Physical Exam   Constitutional: He is oriented to person, place, and time. He appears well-developed and well-nourished.   HENT:   Head: Normocephalic and atraumatic.   Cardiovascular: Normal rate, regular rhythm and normal heart sounds. Exam reveals no S3 and no S4.   No murmur heard.  Pulmonary/Chest: Effort normal and breath " sounds normal. He has no wheezes. He has no rales.   Abdominal: Soft. Bowel sounds are normal.   Musculoskeletal: He exhibits no edema.   Neurological: He is alert and oriented to person, place, and time.   Skin: Skin is warm and dry.   Psychiatric: He has a normal mood and affect. His behavior is normal.       Lab Results   Component Value Date     02/19/2020    K 3.8 02/19/2020     02/19/2020    CO2 23.7 02/19/2020    BUN 11 02/19/2020    CREATININE 1.17 02/19/2020    GLUCOSE 154 (H) 02/19/2020    CALCIUM 9.3 02/19/2020    AST 26 02/19/2020    ALT 21 02/19/2020    ALKPHOS 85 02/19/2020    LABIL2 1.1 (L) 05/29/2016     Lab Results   Component Value Date    CKTOTAL 153 06/20/2019     Lab Results   Component Value Date    WBC 10.84 (H) 02/19/2020    HGB 16.4 02/19/2020    HCT 47.7 02/19/2020     02/19/2020     Lab Results   Component Value Date    INR 0.98 09/24/2019    INR 1.03 02/06/2018    INR 0.97 09/26/2017     Lab Results   Component Value Date    MG 1.6 02/19/2020     Lab Results   Component Value Date    TSH 2.540 01/22/2020    PSA 0.8 08/06/2019    CHLPL 232 (H) 04/05/2016    TRIG 50 08/12/2019    HDL 81 (H) 08/12/2019     (H) 08/12/2019      Lab Results   Component Value Date    BNP 3.0 09/26/2018           Procedures      Assessment/Plan    Diagnosis Plan   1. Essential hypertension     2. Precordial pain     3. Dyslipidemia                  Recommendations:    1.  I have recommended GI evaluation including EGD for his epigastric chest pains and history of abnormal findings in the past.  Cardiac work-up thus far has been unremarkable.    2.  Follow-up in 6 months or sooner if needed.      Return in about 6 months (around 8/28/2020) for Recheck.    As always, I appreciate very much the opportunity to participate in the cardiovascular care of your patients.      With Best Regards,    BALDOMERO Horowitz

## 2020-02-28 NOTE — PROGRESS NOTES
Patient: Jaquan Mckay   : 1970  Referring practitioner: Khurram Florez MD  Date of Initial Visit: Type: THERAPY  Noted: 2020  Today's Date: 2020  Patient seen for 5 sessions           Subjective Evaluation    History of Present Illness    Subjective comment: Pt reports having 8/10 pain today. Pt reports therapy is helping.       Objective   See Exercise, Manual, and Modality Logs for complete treatment.       Assessment & Plan     Assessment  Assessment details: Tx today consisted of mh to ankle followed by there ex progressed with increased reps and ended with STM.  Pt responded well to added exercises and reported decreased pain to 7/10 following session.    Plan  Plan details: Will follow progressing ankle stability and decreased pain.        Visit Diagnoses:    ICD-10-CM ICD-9-CM   1. Closed fracture of distal end of right fibula with routine healing, unspecified fracture morphology, subsequent encounter S82.831D V54.16   2. Closed avulsion fracture of lateral malleolus of right fibula with routine healing, subsequent encounter S82.61XD V54.19       Progress strengthening /stabilization /functional activity           Timed:  Manual Therapy:  10       mins  01309;  Therapeutic Exercise:    31     mins  11754;     Neuromuscular Jazmin:        mins  33336;    Therapeutic Activity:          mins  73053;     Gait Training:           mins  32387;     Ultrasound:          mins  32245;    Electrical Stimulation:         mins  69445 ( );    Untimed:  Electrical Stimulation:         mins  26052 ( );  Mechanical Traction:         mins  31972;     Timed Treatment:  41    mins   Total Treatment:    49    Mins(8min )  Win Rodriguez, PT  Physical Therapist

## 2020-03-04 ENCOUNTER — TREATMENT (OUTPATIENT)
Dept: PHYSICAL THERAPY | Facility: CLINIC | Age: 50
End: 2020-03-04

## 2020-03-04 DIAGNOSIS — S82.61XD CLOSED AVULSION FRACTURE OF LATERAL MALLEOLUS OF RIGHT FIBULA WITH ROUTINE HEALING, SUBSEQUENT ENCOUNTER: ICD-10-CM

## 2020-03-04 DIAGNOSIS — S82.831D CLOSED FRACTURE OF DISTAL END OF RIGHT FIBULA WITH ROUTINE HEALING, UNSPECIFIED FRACTURE MORPHOLOGY, SUBSEQUENT ENCOUNTER: Primary | ICD-10-CM

## 2020-03-04 PROCEDURE — 97110 THERAPEUTIC EXERCISES: CPT | Performed by: PHYSICAL THERAPIST

## 2020-03-04 NOTE — PROGRESS NOTES
Physical Therapy Daily Progress Note      Patient: Jaquan Mckay   : 1970  Referring practitioner: Khurram Florez MD  Date of Initial Visit: Type: THERAPY  Noted: 2020  Today's Date: 3/4/2020  Patient seen for 6 sessions             Subjective Evaluation    History of Present Illness    Subjective comment: Patient reports that he has 8/10 pain today.Pain  Current pain ratin (pre-8/10, post-7/10)           Objective   See Exercise, Manual, and Modality Logs for complete treatment.       Assessment & Plan     Assessment  Assessment details: Patient tolerated today's session, with reports of slight decrease in pain following session.  Session consisted of MH and there ex.  There ex progressed to include increased repetitions and the addition of new exercises (seated HR, peg , DF/PF with theraband, inversion with theraband, eversion with theraband).  Eversion with theraband discontinued after 3 repetitions, due to patient's report of increased pain.  Patient will continue to be progressed per his tolerance and POC.        Visit Diagnoses:    ICD-10-CM ICD-9-CM   1. Closed fracture of distal end of right fibula with routine healing, unspecified fracture morphology, subsequent encounter S82.831D V54.16   2. Closed avulsion fracture of lateral malleolus of right fibula with routine healing, subsequent encounter S82.61XD V54.19       Progress per Plan of Care and Progress strengthening /stabilization /functional activity           Timed:  Manual Therapy:         mins  23624;  Therapeutic Exercise:    40     mins  69065;     Neuromuscular Jazmin:        mins  94716;    Therapeutic Activity:          mins  19318;     Gait Training:           mins  74615;     Ultrasound:          mins  69640;    Electrical Stimulation:         mins  48133 ( );    Untimed:  Electrical Stimulation:         mins  83732 ( );  Mechanical Traction:         mins  95977;     Timed Treatment:   40   mins   Total  Treatment:     50   mins  Hanna Burks, PT  Physical Therapist

## 2020-03-06 ENCOUNTER — TREATMENT (OUTPATIENT)
Dept: PHYSICAL THERAPY | Facility: CLINIC | Age: 50
End: 2020-03-06

## 2020-03-06 DIAGNOSIS — S82.61XD CLOSED AVULSION FRACTURE OF LATERAL MALLEOLUS OF RIGHT FIBULA WITH ROUTINE HEALING, SUBSEQUENT ENCOUNTER: ICD-10-CM

## 2020-03-06 DIAGNOSIS — S82.831D CLOSED FRACTURE OF DISTAL END OF RIGHT FIBULA WITH ROUTINE HEALING, UNSPECIFIED FRACTURE MORPHOLOGY, SUBSEQUENT ENCOUNTER: Primary | ICD-10-CM

## 2020-03-06 PROCEDURE — 97110 THERAPEUTIC EXERCISES: CPT | Performed by: PHYSICAL THERAPIST

## 2020-03-06 NOTE — PROGRESS NOTES
Physical Therapy Daily Progress Note      Patient: Jaquan Mckay   : 1970  Referring practitioner: Khurram Florez MD  Date of Initial Visit: Type: THERAPY  Noted: 2020  Today's Date: 3/6/2020  Patient seen for 7 sessions           Subjective Evaluation    History of Present Illness    Subjective comment: Jeanmarie reports that he has 9/10 pain today.  He notes that the cold weather has made him more sore today.Pain  Current pain ratin (pre-9/10, post-8/10)           Objective   See Exercise, Manual, and Modality Logs for complete treatment.       Assessment & Plan     Assessment  Assessment details: Therapy session consisted of MH and there ex.  No adverse reactions were noted with MH.  There ex was not progressed, due to patient's report of elevated pain levels.  Patient was slow to perform exercises, due to elevated pain levels.  Patient will continue to be progressed per his tolerance and POC.        Visit Diagnoses:    ICD-10-CM ICD-9-CM   1. Closed fracture of distal end of right fibula with routine healing, unspecified fracture morphology, subsequent encounter S82.831D V54.16   2. Closed avulsion fracture of lateral malleolus of right fibula with routine healing, subsequent encounter S82.61XD V54.19       Progress per Plan of Care and Progress strengthening /stabilization /functional activity           Timed:  Manual Therapy:         mins  58292;  Therapeutic Exercise:    40     mins  40006;     Neuromuscular Jazmin:        mins  85263;    Therapeutic Activity:          mins  13515;     Gait Training:           mins  51233;     Ultrasound:          mins  31887;    Electrical Stimulation:         mins  23183 ( );    Untimed:  Electrical Stimulation:         mins  32896 ( );  Mechanical Traction:         mins  07249;     Timed Treatment:   40   mins   Total Treatment:     50   mins  Hanna Burks PT  Physical Therapist

## 2020-03-11 ENCOUNTER — TREATMENT (OUTPATIENT)
Dept: PHYSICAL THERAPY | Facility: CLINIC | Age: 50
End: 2020-03-11

## 2020-03-11 DIAGNOSIS — S82.831D CLOSED FRACTURE OF DISTAL END OF RIGHT FIBULA WITH ROUTINE HEALING, UNSPECIFIED FRACTURE MORPHOLOGY, SUBSEQUENT ENCOUNTER: Primary | ICD-10-CM

## 2020-03-11 DIAGNOSIS — S82.61XD CLOSED AVULSION FRACTURE OF LATERAL MALLEOLUS OF RIGHT FIBULA WITH ROUTINE HEALING, SUBSEQUENT ENCOUNTER: ICD-10-CM

## 2020-03-11 PROCEDURE — 97110 THERAPEUTIC EXERCISES: CPT | Performed by: PHYSICAL THERAPIST

## 2020-03-11 PROCEDURE — 97140 MANUAL THERAPY 1/> REGIONS: CPT | Performed by: PHYSICAL THERAPIST

## 2020-03-12 ENCOUNTER — OFFICE VISIT (OUTPATIENT)
Dept: FAMILY MEDICINE CLINIC | Facility: CLINIC | Age: 50
End: 2020-03-12

## 2020-03-12 VITALS
HEART RATE: 76 BPM | DIASTOLIC BLOOD PRESSURE: 80 MMHG | HEIGHT: 67 IN | SYSTOLIC BLOOD PRESSURE: 128 MMHG | BODY MASS INDEX: 35.63 KG/M2 | OXYGEN SATURATION: 98 % | WEIGHT: 227 LBS | TEMPERATURE: 97.9 F

## 2020-03-12 DIAGNOSIS — K21.9 GASTROESOPHAGEAL REFLUX DISEASE WITHOUT ESOPHAGITIS: Primary | ICD-10-CM

## 2020-03-12 DIAGNOSIS — G89.29 CHRONIC BILATERAL LOW BACK PAIN WITH LEFT-SIDED SCIATICA: ICD-10-CM

## 2020-03-12 DIAGNOSIS — M94.262 CHONDROMALACIA OF LEFT KNEE: ICD-10-CM

## 2020-03-12 DIAGNOSIS — M54.42 CHRONIC BILATERAL LOW BACK PAIN WITH LEFT-SIDED SCIATICA: ICD-10-CM

## 2020-03-12 PROCEDURE — 99214 OFFICE O/P EST MOD 30 MIN: CPT | Performed by: NURSE PRACTITIONER

## 2020-03-12 RX ORDER — HYDROCODONE BITARTRATE AND ACETAMINOPHEN 10; 325 MG/1; MG/1
1 TABLET ORAL 2 TIMES DAILY PRN
Qty: 60 TABLET | Refills: 0 | Status: SHIPPED | OUTPATIENT
Start: 2020-03-12 | End: 2020-04-14 | Stop reason: SDUPTHER

## 2020-03-12 NOTE — PROGRESS NOTES
"Subjective   Jaquan Mckay is a 49 y.o. male.     Chief Complaint   Patient presents with   • URI       History of Present Illness     HTN-recent cardiology visit.  Was advised to have GI evaluation.  He denies any recent palpitations.  No CP.  No medication changes.  Cough-acute for several days.  No SOA.  Has used inhaler without any improvement.  He reports dry in nature and \"feels like my asthma\".  He reports he has been needing a fan beside the bed due to \"smothing\" when \"I wake up\".  He has not been seen at allergy specialist in approx 1 year.  He has been on advair in the past but \"it quit working\" and insurance had a formulary change.    Diarrhea-reports for several days \"since bowels got straightened up\".  He feels it may be related to some food that he ate at fast food restaurant.  Some mild abdominal cramping.  Ongoing.  right heel pain-present for approx 2 weeks.  Tender to touch.  Reports \"Thought it was related to air cast\".  No new injury.  He does have an upcoming follow-up with Dr. walker who is following him for foot fracture.  He has told Dr. walker about the area of soreness as he felt it was related to his initial injury.  He has not noted any swelling or erythema of the area.  Not at goal    The following portions of the patient's history were reviewed and updated as appropriate: CC, ROS, allergies, current medications, past family history, past medical history, past social history, past surgical history and problem list.      Review of Systems   Constitutional: Negative for appetite change, fatigue and unexpected weight change.   HENT: Negative for congestion, ear pain, nosebleeds, postnasal drip, rhinorrhea, sore throat, trouble swallowing and voice change.    Eyes: Negative for pain and visual disturbance.        Wears glasses   Respiratory: Negative for cough, shortness of breath and wheezing.    Cardiovascular: Positive for leg swelling (right leg since ankle fracture). Negative for chest pain " "and palpitations.   Gastrointestinal: Negative for abdominal pain, blood in stool, constipation and diarrhea.   Endocrine: Negative for cold intolerance and polydipsia.   Genitourinary: Negative for difficulty urinating, flank pain and hematuria.   Musculoskeletal: Positive for arthralgias, back pain and gait problem. Negative for joint swelling and myalgias.   Skin: Positive for wound (top of right hand). Negative for color change and rash.   Allergic/Immunologic: Negative.    Neurological: Negative for dizziness, syncope, numbness and headaches.   Hematological: Negative.    Psychiatric/Behavioral: Negative for sleep disturbance (stable on meds) and suicidal ideas. The patient is nervous/anxious.    All other systems reviewed and are negative.      Objective     /80   Pulse 76   Temp 97.9 °F (36.6 °C) (Oral)   Ht 170.2 cm (67.01\")   Wt 103 kg (227 lb)   SpO2 98%   BMI 35.54 kg/m²     Physical Exam    Assessment/Plan     Problem List Items Addressed This Visit        Digestive    Gastroesophageal reflux disease without esophagitis - Primary    Relevant Orders    Ambulatory Referral to Gastroenterology       Nervous and Auditory    Chronic bilateral low back pain with left-sided sciatica    Relevant Medications    HYDROcodone-acetaminophen (NORCO)  MG per tablet       Musculoskeletal and Integument    Chondromalacia, knee    Relevant Medications    HYDROcodone-acetaminophen (NORCO)  MG per tablet          Patient's Body mass index is 35.54 kg/m². BMI is above normal parameters. Recommendations include: nutrition counseling.     Understands disease processes and need for medications.  Understands reasons for urgent and emergent care.  Patient (& family) verbalized agreement for treatment plan.     Emotional support and active listening provided.  Patient provided time to verbalize feelings.    CHAVEZ #[id number] reviewed today and consistent.  Will refill prescribed controlled medication today.  " Patient is aware they cannot receive narcotics from any other provider except if under care of pain management or speciality clinic.  Risk and benefits of medication use has been reviewed.  History and physical exam exhibit continued safe and appropriate use of controlled substances.  The patient is aware of the potential for addiction and dependence.  This patient has been made aware of the appropriate use of such medications, including potential risk of somnolence, limited ability to drive and / or work safely, and potential for overdose.    It has also been made clear that these medications are for use by this patient only, without concomitant use of alcohol or other substances unless prescribed/advised by medical provider.  Patient understands they may be subject to UDS and pill counts at random.      Patient considered to be low risk for addiction due to use of single controlled medications.  Patient understands and accepts these risks.  Patient need for medication will be reassessed at each visit.  Doses will be adjusted according to patient need and findings.    Goal of TX: Patient will not have any adverse reactions of medication.  Patient have reduction in pain symptoms with use of PRN Norco as directed.  Patient will not have any adverse side effects from medication.  Patient will be able to remain active in an outside of home without interference from pain symptoms.    Advised to report heel pain to Dr. walker for repeat x-ray.  Suspect heel spur due to location and tenderness on exam.     Unable to give flu vaccine due to office insufficient quantity.    Continue under care of cardiology and Podiatry.  Will refer to GI for evaluation.   Will plan for updated labs and dilantin level.   RTC 1 month, sooner if needed for problems or concerns                This document has been electronically signed by:  BALDOMERO Thao, FNP-C    Dragon disclaimer:  Much of this encounter note is an electronic  transcription/translation of spoken language to printed text. The electronic translation of spoken language may permit erroneous, or at times, nonsensical words or phrases to be inadvertently transcribed; Although I have reviewed the note for such errors, some may still exist.

## 2020-03-13 ENCOUNTER — TREATMENT (OUTPATIENT)
Dept: PHYSICAL THERAPY | Facility: CLINIC | Age: 50
End: 2020-03-13

## 2020-03-13 DIAGNOSIS — S82.831D CLOSED FRACTURE OF DISTAL END OF RIGHT FIBULA WITH ROUTINE HEALING, UNSPECIFIED FRACTURE MORPHOLOGY, SUBSEQUENT ENCOUNTER: Primary | ICD-10-CM

## 2020-03-13 DIAGNOSIS — S82.61XD CLOSED AVULSION FRACTURE OF LATERAL MALLEOLUS OF RIGHT FIBULA WITH ROUTINE HEALING, SUBSEQUENT ENCOUNTER: ICD-10-CM

## 2020-03-13 PROCEDURE — 97140 MANUAL THERAPY 1/> REGIONS: CPT | Performed by: PHYSICAL THERAPIST

## 2020-03-13 PROCEDURE — 97110 THERAPEUTIC EXERCISES: CPT | Performed by: PHYSICAL THERAPIST

## 2020-03-13 NOTE — PROGRESS NOTES
Progress Note      Patient: Jaquan Mckay   : 1970  Diagnosis/ICD-10 Code:  Closed fracture of distal end of right fibula with routine healing, unspecified fracture morphology, subsequent encounter [S82.832I]  Referring practitioner: Khurram Florez MD  Date of Initial Visit: Type: THERAPY  Noted: 2020  Today's Date: 3/13/2020  Patient seen for 9 sessions      Subjective:   Jaquan Mckay reports: He has noticed his ankle ROM has improved.  He notes that he continues to have increased pain.  Subjective Questionnaire: LEFS: 28.75% improved  Clinical Progress: improved  Home Program Compliance: Yes      Subjective Evaluation    Pain  Current pain ratin  At best pain ratin  At worst pain ratin  Location: R) foot/ankle         Objective       Palpation     Additional Palpation Details  Tenderness reported throughout the entire right foot/ankle with palpation.    Active Range of Motion   Left Ankle/Foot   Dorsiflexion (ke): 10 degrees   Plantar flexion: 52 degrees   Inversion: 34 degrees   Eversion: 15 degrees     Right Ankle/Foot   Dorsiflexion (ke): 0 degrees   Plantar flexion: 50 degrees   Inversion: 24 degrees with pain  Eversion: 12 degrees with pain    Additional Active Range of Motion Details  Able to achieve neutral position (0 degrees) with dorsiflexion    Strength/Myotome Testing     Left Ankle/Foot   Dorsiflexion: 4+  Plantar flexion: 4+  Inversion: 4+  Eversion: 4+    Right Ankle/Foot   Dorsiflexion: 3+  Plantar flexion: 3+  Inversion: 3+  Eversion: 3+    Swelling   Left Ankle/Foot   Metatarsal heads: 62 cm    Right Ankle/Foot   Metatarsal heads: 63 cm     Assessment & Plan     Assessment  Impairments: abnormal gait, abnormal or restricted ROM, activity intolerance, impaired balance, impaired physical strength, lacks appropriate home exercise program, pain with function and weight-bearing intolerance  Assessment details: Patient has been attending physical therapy for right distal  fibular fracture with medial avulsion to malleolus. Patient has shown improvements in ankle ROM and ankle strength; he continues to report elevated pain levels.  Patient can achieve neutral right ankle dorsiflexion.  Patient reports a 20% improvement in functional mobility impairment, based on his response to the LEFS.  He will continue to benefit from skilled PT, so that patient can achieve maximum level of function.     Prognosis: good  Functional Limitations: sleeping, walking, pulling, pushing, uncomfortable because of pain, sitting, standing and stooping  Goals  Plan Goals: SHORT TERM GOALS:     4 weeks  1. Patient will be independent/compliant with HEP.   Met  2.Right ankle plantarflexion/dorsiflexion ROM will improve by at least 5 degrees to allow for greater ease with ADLs.  Met  3. Patient will demonstrate ability to ambulate in the clinic with equal weight shift.  Ongoing, progressing  4. Patient will report right ankle pain no greater than 7/10 when performing self-care activities.  Ongoing, progressing-8/10 pain reported    LONG TERM GOALS:   12 weeks  1. Patient will achieve within 3 degrees of unaffected ankle to allow for greater ease with daily tasks.  Ongoing, progressing  2. Patient to demonstrate ability to ambulate 10 minutes with normal gait pattern with pain no greater than 5/10 for improved community involvement.  Ongoing, progressing-reports 5 min walking tolerance, noting 8/10 pain  3. Patient will report at least 55/80 on LEFS to show improved functional mobility.  Met-57/80  4. Right ankle strength will improve to at least 4/5 to prevent reinjury.  Ongoing, progressing      Plan  Therapy options: will be seen for skilled physical therapy services  Planned modality interventions: iontophoresis, cryotherapy, electrical stimulation/Russian stimulation, TENS, thermotherapy (hydrocollator packs) and ultrasound  Planned therapy interventions: manual therapy, balance/weight-bearing training,  neuromuscular re-education, body mechanics training, postural training, soft tissue mobilization, flexibility, strengthening, gait training, stretching, home exercise program, therapeutic activities and joint mobilization  Duration in visits: 20  Duration in weeks: 12  Treatment plan discussed with: patient  Plan details: Re-evaluation   28069    Therapeutic exercise  47966  Therapeutic activity    60424  Neuromuscular re-education   04549  Manual therapy   30387  Gait training  70805    Attended e-stim  49426  Unattended e-stim (Private)  58185  Unattended e-stim (Medicaid/Medicare)     Moist heat/cryotherapy 87841   Ultrasound   12010  Iontophoresis   77751            Visit Diagnoses:    ICD-10-CM ICD-9-CM   1. Closed fracture of distal end of right fibula with routine healing, unspecified fracture morphology, subsequent encounter S82.831D V54.16   2. Closed avulsion fracture of lateral malleolus of right fibula with routine healing, subsequent encounter S82.61XD V54.19       Progress toward previous goals: Partially Met        Recommendations: Continue as planned  Timeframe: 1 month  Prognosis to achieve goals: good    PT Signature: Hanna Burks, PT      Based upon review of the patient's progress and continued therapy plan, it is my medical opinion that Jaquan Mckay should continue physical therapy treatment at [facility]  [address] [address] [phone].    Signature: __________________________________  Khurram Florez MD    Timed:  Manual Therapy:   12      mins  42842;  Therapeutic Exercise:    42     mins  50712;     Neuromuscular Jazmin:        mins  55994;    Therapeutic Activity:          mins  60852;     Gait Training:           mins  95154;     Ultrasound:          mins  37660;    Electrical Stimulation:         mins  55742 ( );    Untimed:  Electrical Stimulation:         mins  33950 (  );  Mechanical Traction:         mins  53647;     Timed Treatment:   54   mins   Total Treatment:     62   mins

## 2020-03-16 NOTE — ASSESSMENT & PLAN NOTE
Dietary counseling provided:  Healthy food choices including fruits and vegetables, limit soda and junk foods, adequate water intake.  If GI work up is negative, will resume adipex for weight loss.

## 2020-03-18 ENCOUNTER — TREATMENT (OUTPATIENT)
Dept: PHYSICAL THERAPY | Facility: CLINIC | Age: 50
End: 2020-03-18

## 2020-03-18 DIAGNOSIS — S82.831D CLOSED FRACTURE OF DISTAL END OF RIGHT FIBULA WITH ROUTINE HEALING, UNSPECIFIED FRACTURE MORPHOLOGY, SUBSEQUENT ENCOUNTER: Primary | ICD-10-CM

## 2020-03-18 DIAGNOSIS — S82.61XD CLOSED AVULSION FRACTURE OF LATERAL MALLEOLUS OF RIGHT FIBULA WITH ROUTINE HEALING, SUBSEQUENT ENCOUNTER: ICD-10-CM

## 2020-03-18 PROCEDURE — 97110 THERAPEUTIC EXERCISES: CPT | Performed by: PHYSICAL THERAPIST

## 2020-03-18 PROCEDURE — 97140 MANUAL THERAPY 1/> REGIONS: CPT | Performed by: PHYSICAL THERAPIST

## 2020-03-18 NOTE — PROGRESS NOTES
Physical Therapy Daily Progress Note      Patient: Jaquan Mckay   : 1970  Referring practitioner: Khurram Florez MD  Date of Initial Visit: Type: THERAPY  Noted: 2020  Today's Date: 3/18/2020  Patient seen for 10 sessions         Subjective Evaluation    History of Present Illness    Subjective comment: Patient reports /10 right ankle pain prior to today's session.  Pt states he goes for his nerve condiction study tomorrow,  and then is will be seeing the ortho after he gets his results.         Objective   See Exercise, Manual, and Modality Logs for complete treatment.       Assessment & Plan     Assessment  Assessment details: Patient completed today's tx w/ reports of slight decrease in pain following, 6/10.  MH applied to pt's R) ankle f/b there ex as listed, manual rx, and conclusion of cryotherapy.  There ex performed w/ continued focus on improved ankle mobility, ankle strength, stability, and restoration of function.  Pt noted w/ some crepitus during standing HR, therefore repetitions reduced.  Pt continues to benefit from therapy services to address goals, decrease pain, and achieve maximum level of function.  Continue w/ PT's POC.         Visit Diagnoses:    ICD-10-CM ICD-9-CM   1. Closed fracture of distal end of right fibula with routine healing, unspecified fracture morphology, subsequent encounter S82.831D V54.16   2. Closed avulsion fracture of lateral malleolus of right fibula with routine healing, subsequent encounter S82.61XD V54.19       Progress per Plan of Care and Progress strengthening /stabilization /functional activity           Timed:  Manual Therapy:    10     mins  92440;  Therapeutic Exercise:     39    mins  35512;     Neuromuscular Jazmin:        mins  32024;    Therapeutic Activity:          mins  92964;     Gait Training:           mins  18994;     Ultrasound:          mins  16842;    Electrical Stimulation:         mins  44983 ( );    Untimed:  Electrical  Stimulation:         mins  66555 ( );  Mechanical Traction:         mins  63102;     Timed Treatment:   49   mins   Total Treatment:   67     mins  Leighann Brown. NEIL [place]  Physical Therapist

## 2020-03-23 ENCOUNTER — HOSPITAL ENCOUNTER (OUTPATIENT)
Dept: GENERAL RADIOLOGY | Facility: HOSPITAL | Age: 50
Discharge: HOME | End: 2020-03-23
Admitting: PHYSICIAN ASSISTANT

## 2020-03-23 ENCOUNTER — OFFICE VISIT (OUTPATIENT)
Dept: GASTROENTEROLOGY | Facility: CLINIC | Age: 50
End: 2020-03-23

## 2020-03-23 VITALS
HEIGHT: 67 IN | SYSTOLIC BLOOD PRESSURE: 123 MMHG | WEIGHT: 227 LBS | DIASTOLIC BLOOD PRESSURE: 79 MMHG | BODY MASS INDEX: 35.63 KG/M2 | HEART RATE: 89 BPM | OXYGEN SATURATION: 98 % | TEMPERATURE: 98.5 F

## 2020-03-23 DIAGNOSIS — R15.2 FECAL URGENCY: ICD-10-CM

## 2020-03-23 DIAGNOSIS — R07.9 CHEST PAIN, UNSPECIFIED TYPE: ICD-10-CM

## 2020-03-23 DIAGNOSIS — R19.7 DIARRHEA, UNSPECIFIED TYPE: ICD-10-CM

## 2020-03-23 DIAGNOSIS — K21.9 GASTROESOPHAGEAL REFLUX DISEASE, ESOPHAGITIS PRESENCE NOT SPECIFIED: Primary | ICD-10-CM

## 2020-03-23 PROCEDURE — 74019 RADEX ABDOMEN 2 VIEWS: CPT

## 2020-03-23 PROCEDURE — 99243 OFF/OP CNSLTJ NEW/EST LOW 30: CPT | Performed by: PHYSICIAN ASSISTANT

## 2020-03-23 PROCEDURE — 74019 RADEX ABDOMEN 2 VIEWS: CPT | Performed by: RADIOLOGY

## 2020-03-23 RX ORDER — SUCRALFATE ORAL 1 G/10ML
1 SUSPENSION ORAL
Qty: 420 ML | Refills: 1 | Status: SHIPPED | OUTPATIENT
Start: 2020-03-23 | End: 2020-04-20

## 2020-03-23 RX ORDER — DEXLANSOPRAZOLE 60 MG/1
60 CAPSULE, DELAYED RELEASE ORAL DAILY
Qty: 30 CAPSULE | Refills: 5 | Status: SHIPPED | OUTPATIENT
Start: 2020-03-23 | End: 2020-06-10 | Stop reason: SDUPTHER

## 2020-03-23 NOTE — PROGRESS NOTES
: 1970    Chief Complaint   Patient presents with   • non-cardiac chest pain       Jaquan Mckay is a 49 y.o. male who presents to the office today as a consultation from BALDOMERO Thao for evaluation of GERD and chest pain.    History of Present Illness:  Patient reports that over the past couple of months, he has had very severe chest discomfort, points to the bottom of his sternum and radiates into the epigastric region.  He does have severe heartburn.  He states that he has taken several medications for this in the past without relief, cannot recall the names of the medications.  Currently, he is taking Protonix 40 mg once daily in the morning and taking 2 tablets of Pepcid 20mg at night due to severe discomfort.  He follows with cardiology and has had heart catheterization.  He was told that his chest pain was likely related to GERD.  He drinks water mostly, sometimes drinks sweet tea when he is eating at a restaurant.  He does smoke cigarettes daily.  Reports that his stress currently is average.  Does not drink alcohol.  He does not have a gallbladder.  He had an EGD a long time ago and states that he had stomach ulcers which were bleeding.  His last colonoscopy was approximately 5 years ago by Dr. Herrmann was normal but he does have a history of several colon polyps, reports 17 removed at one time.  Complains of diarrhea currently with urgency after meals.  Stools are watery in nature.  Previously, he had constipation.  He does not skip any days between bowel movements.  Denies any melena or rectal bleeding.    Review of Systems   Constitutional: Negative for chills, fatigue and fever.   HENT: Negative for trouble swallowing.    Eyes: Negative.    Respiratory: Positive for cough and shortness of breath. Negative for choking and chest tightness.    Cardiovascular: Positive for chest pain.   Gastrointestinal: Positive for abdominal pain and diarrhea. Negative for abdominal distention, anal  "bleeding, blood in stool, constipation, nausea and vomiting.   Endocrine: Negative.    Genitourinary: Negative for difficulty urinating.   Musculoskeletal: Positive for back pain. Negative for neck pain.   Skin: Negative for color change, pallor, rash and wound.   Allergic/Immunologic: Negative for environmental allergies and food allergies.   Neurological: Positive for headaches. Negative for dizziness and light-headedness.   Hematological: Does not bruise/bleed easily.   Psychiatric/Behavioral: Negative.      I have reviewed and confirmed the accuracy of the ROS as documented by the MA/LPN/RN Chetna Chamberlain PA-C    Past Medical History:   Diagnosis Date   • Allergic    • Anxiety    • Arthritis    • Asthma    • Body piercing     REPORTS CYLICONE IN EARS   • Clotting disorder (CMS/HCC) 2004    had a knee surgery   • Depression    • DVT (deep venous thrombosis) (CMS/[facility])     RIGHT RIGHT KNEE AFTER SURGERY YEARS AGO IN 2001 OR 2004   • Gastric ulcer    • GERD (gastroesophageal reflux disease)    • H/O migraine    • H/O sleep apnea     REPORTS DIAGNOSED WITH SLEEP APNEA AND COULDN'T STAND TO WEAR THE MACHINE   • Headache    • Heart attack (CMS/[facility])     REPORTS \"LIGHT HEART ATTACK A LONG TIME AGO\"  \"EARLY 90'S\"   • History of seizures     REPORTS LAST EPISODE WAS AROUND 1995.   • Hostility    • Hyperlipidemia    • Hypertension    • Knee pain, acute     Left   • Low back pain    • Migraine    • MRSA (methicillin resistant Staphylococcus aureus)     REPORTS LAST TESTED + 2004. WAS TREATED HE REPORTS.  RIGHT ARM, RIGHT KNEE.   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    • Obesity    • Poor historian    • [place] spotted fever    • Seizures (CMS/HCC)    • Tattoo    • Wears glasses        Past Surgical History:   Procedure Laterality Date   • BACK SURGERY     • BRAIN SURGERY  1986    Tumor removal    • CARDIAC CATHETERIZATION N/A 9/28/2018    Procedure: Left Heart Cath;  Surgeon: Leandro Daily MD;  Location:  [facility] CATH INVASIVE " LOCATION;  Service: Cardiology   • CHOLECYSTECTOMY     • CYST REMOVAL     • KNEE ARTHROSCOPY Left 10/20/2017    Procedure: Diagnostic arthroscopy left knee with chondroplasty;  Surgeon: Marco Aguirre MD;  Location: [facility] OR;  Service:    • KNEE SURGERY Right    • MOUTH SURGERY      FULL MOUTH EXTRACTION   • OTHER SURGICAL HISTORY      REPORTS 7 TICKS REMOVED FROM RIGHT ARM IN 2001 OR 2002   • TUMOR EXCISION      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       Family History   Problem Relation Age of Onset   • Diabetes Mother    • Hypertension Mother    • Stroke Mother    • Diabetes Father    • Skin cancer Father    • Hypertension Father    • Heart attack Father    • Diabetes Brother    • Hypertension Brother    • Heart disease Maternal Aunt    • Heart disease Maternal Uncle    • Heart disease Paternal Aunt    • Heart disease Paternal Uncle    • Heart disease Maternal Grandmother    • Heart disease Maternal Grandfather    • Heart disease Paternal Grandmother    • Heart disease Paternal Grandfather        Social History     Socioeconomic History   • Marital status:      Spouse name: Becca   • Number of children: 2   • Years of education: 12   • Highest education level: Not on file   Occupational History   • Occupation: DISABLED   Social Needs   • Financial resource strain: Somewhat hard   • Food insecurity:     Worry: Sometimes true     Inability: Sometimes true   • Transportation needs:     Medical: No     Non-medical: No   Tobacco Use   • Smoking status: Current Every Day Smoker     Packs/day: 1.50     Years: 17.00     Pack years: 25.50     Types: Cigars, Cigarettes     Start date: 5/5/2010   • Smokeless tobacco: Never Used   • Tobacco comment: still uses 1.5 packs a day.   Substance and Sexual Activity   • Alcohol use: No   • Drug use: No   • Sexual activity: Defer   Lifestyle   • Physical activity:     Days per week: 0 days     Minutes per session: 0 min   • Stress: To some extent   Relationships    • Social connections:     Talks on phone: Once a week     Gets together: Once a week     Attends [employer] service: Never     Active member of club or organization: No     Attends meetings of clubs or organizations: Never     Relationship status:        Current Outpatient Medications:   •  albuterol (PROVENTIL) (2.5 MG/3ML) 0.083% nebulizer solution, Take 2.5 mg by nebulization Every 4 (Four) Hours As Needed for Shortness of Air., Disp: 180 vial, Rfl: 5  •  albuterol sulfate  (90 Base) MCG/ACT inhaler, Inhale 2 puffs Every 4 (Four) Hours As Needed for Shortness of Air., Disp: 1 inhaler, Rfl: 5  •  ASPIRIN ADULT LOW STRENGTH 81 MG EC tablet, TAKE 1 TABLET BY MOUTH DAILY FOR HEART HEALTH AND CIRCULATION, Disp: 30 tablet, Rfl: 3  •  atorvastatin (LIPITOR) 40 MG tablet, Take 1 tablet by mouth Daily., Disp: 90 tablet, Rfl: 3  •  baclofen (LIORESAL) 10 MG tablet, Take 0.5-1 tablets by mouth 2 (Two) Times a Day., Disp: 60 tablet, Rfl: 2  •  Blood Pressure Monitoring (HEALTH SENSE BP MONITOR) device, 1 each Daily., Disp: 1 each, Rfl: 0  •  budesonide-formoterol (SYMBICORT) 160-4.5 MCG/ACT inhaler, Inhale 2 puffs 2 (Two) Times a Day., Disp: 10.2 g, Rfl: 5  •  busPIRone (BUSPAR) 7.5 MG tablet, Take 1 tablet by mouth 3 (Three) Times a Day. Stop order for 5 mg, Disp: 90 tablet, Rfl: 3  •  cyclobenzaprine (FLEXERIL) 10 MG tablet, , Disp: , Rfl:   •  DILANTIN 100 MG ER capsule, TAKE 2 CAPSULES BY MOUTH EVERY 12 HOURS AS DIRECTED, Disp: 120 capsule, Rfl: 5  •  diphenhydrAMINE (BENADRYL ALLERGY) 25 MG tablet, Take 1 tablet by mouth Every 6 (Six) Hours As Needed for Itching or Allergies., Disp: 30 tablet, Rfl: 1  •  famotidine (PEPCID) 40 MG tablet, , Disp: , Rfl:   •  HYDROcodone-acetaminophen (NORCO)  MG per tablet, Take 1 tablet by mouth 2 (Two) Times a Day As Needed for Moderate Pain ., Disp: 60 tablet, Rfl: 0  •  ibuprofen (ADVIL,MOTRIN) 800 MG tablet, , Disp: , Rfl:   •  ketoconazole (NIZORAL) 2 %  shampoo, Apply  topically to the appropriate area as directed 2 (Two) Times a Week. To scalp, Disp: 120 mL, Rfl: 5  •  lisinopril (PRINIVIL,ZESTRIL) 20 MG tablet, Take 2 tablets by mouth Daily. FOR BLOOD PRESSURE, Disp: 60 tablet, Rfl: 5  •  loratadine (CLARITIN) 10 MG tablet, Take 1 tablet by mouth Daily As Needed for Allergies., Disp: 30 tablet, Rfl: 5  •  meclizine (ANTIVERT) 12.5 MG tablet, Take 1 tablet by mouth 3 (Three) Times a Day As Needed for dizziness., Disp: 30 tablet, Rfl: 0  •  Misc. Devices (BATH/SHOWER SEAT) misc, 1 each Daily., Disp: 1 each, Rfl: 0  •  montelukast (SINGULAIR) 10 MG tablet, Take 1 tablet by mouth Every Night., Disp: 30 tablet, Rfl: 5  •  mupirocin (BACTROBAN) 2 % ointment, Apply  topically to the appropriate area as directed 3 (Three) Times a Day., Disp: 30 g, Rfl: 0  •  ondansetron ODT (ZOFRAN-ODT) 4 MG disintegrating tablet, Take 1 tablet by mouth Every 6 (Six) Hours As Needed for Nausea or Vomiting., Disp: 30 tablet, Rfl: 0  •  pantoprazole (PROTONIX) 40 MG EC tablet, Take 1 tablet by mouth Daily., Disp: 30 tablet, Rfl: 5  •  phenytoin extended (DILANTIN) 200 MG ER capsule, Take 1 capsule by mouth Every 12 (Twelve) Hours. NEEDS BRAND NAME NOT GENERIC, Disp: 60 capsule, Rfl: 0  •  potassium chloride (K-DUR) 10 MEQ CR tablet, , Disp: , Rfl:   •  vitamin D (ERGOCALCIFEROL) 1.25 MG (56741 UT) capsule capsule, Take 1 capsule by mouth Every 7 (Seven) Days. On Friday, Disp: 5 capsule, Rfl: 5    Allergies:   Ciprofloxacin; Mobic [meloxicam]; Paxil [paroxetine hcl]; Peanut-containing drug products; Penicillins; Pristiq [desvenlafaxine succinate er]; Sulfa antibiotics; Doxycycline; Fish-derived products; Isosorbide nitrate; Clarithromycin; Clindamycin/lincomycin; Contrast dye; Diltiazem; Gabapentin; Keflex [cephalexin]; Metoprolol; Prednisone; Robitussin cough+ chest max st [dextromethorphan-guaifenesin]; Shrimp (diagnostic); Spironolactone; Viibryd [vilazodone hcl]; and Zoloft [sertraline  "hcl]    Vitals:  /79 (BP Location: Left arm, Patient Position: Sitting, Cuff Size: Adult)   Pulse 89   Temp 98.5 °F (36.9 °C)   Ht 170.2 cm (67\")   Wt 103 kg (227 lb)   SpO2 98%   BMI 35.55 kg/m²     Physical Exam   Constitutional: He is oriented to person, place, and time. He appears well-developed and well-nourished. No distress.   HENT:   Head: Normocephalic and atraumatic.   Right Ear: External ear normal.   Left Ear: External ear normal.   Nose: Nose normal.   Eyes: Conjunctivae and EOM are normal. Right eye exhibits no discharge. Left eye exhibits no discharge. No scleral icterus.   Neck: Normal range of motion. No tracheal deviation present.   Pulmonary/Chest: Effort normal. No respiratory distress.   Musculoskeletal: Normal range of motion. He exhibits no edema.   Neurological: He is alert and oriented to person, place, and time. Coordination normal.   Skin: No rash noted. He is not diaphoretic. No erythema. No pallor.   tattoos   Psychiatric: He has a normal mood and affect. His behavior is normal. Judgment and thought content normal.     Assessment:  1. Gastroesophageal reflux disease, esophagitis presence not specified    2. Diarrhea, unspecified type    3. Fecal urgency    4. Chest pain, unspecified type      Plan:  Orders Placed This Encounter   Procedures   • XR Abdomen Flat & Upright     New Medications Ordered This Visit   Medications   • sucralfate (Carafate) 1 GM/10ML suspension     Sig: Take 10 mL by mouth 4 (Four) Times a Day With Meals & at Bedtime.     Dispense:  420 mL     Refill:  1   • dexlansoprazole (Dexilant) 60 MG capsule     Sig: Take 1 capsule by mouth Daily.     Dispense:  30 capsule     Refill:  5     EGD discussed, may need arranged in future. We are not scheduling elective procedures at this time.    Discontinue Protonix due to inefficacy. Start taking Dexilant 60 mg once daily for GERD, take in middle of the day. Take Pepcid 40 mg nightly and 1 more time throughout the " day if needed. Carafate only as needed due to chest pain. He will call back if symptoms have not improved within 2 weeks.    Due to GERD, he was instructed not to lie down immediately after eating (wait at least 3 hours after meals), elevate the head of the bed at night, avoid spicy foods, avoid mints, avoid caffeine, avoid nicotine and work on getting to a healthy weight.           Return in about 1 month (around 4/23/2020) for recheck chest pain.      Electronically signed 3/23/2020 13:40  Chetna Cahmberlain PA-C, [facility]

## 2020-04-14 ENCOUNTER — OFFICE VISIT (OUTPATIENT)
Dept: FAMILY MEDICINE CLINIC | Facility: CLINIC | Age: 50
End: 2020-04-14

## 2020-04-14 DIAGNOSIS — G47.33 OBSTRUCTIVE SLEEP APNEA: Primary | ICD-10-CM

## 2020-04-14 DIAGNOSIS — F51.01 PRIMARY INSOMNIA: ICD-10-CM

## 2020-04-14 DIAGNOSIS — M94.262 CHONDROMALACIA OF LEFT KNEE: ICD-10-CM

## 2020-04-14 DIAGNOSIS — G89.29 CHRONIC BILATERAL LOW BACK PAIN WITH LEFT-SIDED SCIATICA: ICD-10-CM

## 2020-04-14 DIAGNOSIS — M54.42 CHRONIC BILATERAL LOW BACK PAIN WITH LEFT-SIDED SCIATICA: ICD-10-CM

## 2020-04-14 DIAGNOSIS — J45.30 MILD PERSISTENT ASTHMA WITHOUT COMPLICATION: ICD-10-CM

## 2020-04-14 PROCEDURE — 99443 PR PHYS/QHP TELEPHONE EVALUATION 21-30 MIN: CPT | Performed by: NURSE PRACTITIONER

## 2020-04-14 RX ORDER — HYDROCODONE BITARTRATE AND ACETAMINOPHEN 10; 325 MG/1; MG/1
1 TABLET ORAL 2 TIMES DAILY PRN
Qty: 60 TABLET | Refills: 0 | Status: SHIPPED | OUTPATIENT
Start: 2020-04-14 | End: 2020-05-12 | Stop reason: SDUPTHER

## 2020-04-14 RX ORDER — ALBUTEROL SULFATE 90 UG/1
2 AEROSOL, METERED RESPIRATORY (INHALATION) EVERY 4 HOURS PRN
Qty: 1 INHALER | Refills: 5 | Status: SHIPPED | OUTPATIENT
Start: 2020-04-14 | End: 2020-04-20 | Stop reason: ALTCHOICE

## 2020-04-14 RX ORDER — ORPHENADRINE CITRATE 100 MG/1
100 TABLET, EXTENDED RELEASE ORAL 2 TIMES DAILY
Qty: 60 TABLET | Refills: 2 | Status: SHIPPED | OUTPATIENT
Start: 2020-04-14 | End: 2020-04-20 | Stop reason: ALTCHOICE

## 2020-04-14 RX ORDER — BUSPIRONE HYDROCHLORIDE 15 MG/1
15 TABLET ORAL 3 TIMES DAILY
Qty: 90 TABLET | Refills: 2 | Status: SHIPPED | OUTPATIENT
Start: 2020-04-14 | End: 2021-01-04 | Stop reason: SDUPTHER

## 2020-04-14 RX ORDER — MIRTAZAPINE 30 MG/1
15-30 TABLET, FILM COATED ORAL NIGHTLY
Qty: 60 TABLET | Refills: 5 | Status: SHIPPED | OUTPATIENT
Start: 2020-04-14 | End: 2020-09-03 | Stop reason: SDUPTHER

## 2020-04-14 NOTE — ASSESSMENT & PLAN NOTE
Continue under the care of asthma specialist.  Will attempt to send Advair HFA as patient has tolerated Advair in the past for asthma control.  Will not send powder inhalation due to patient's reports of mouth irritation and ulceration.  Continue PRN albuterol

## 2020-04-14 NOTE — PROGRESS NOTES
"You have chosen to receive care through a telephone visit today. Do you consent to use a telephone visit for your medical care today? Yes    Establish patient called office of APRN to discuss health conditions.  Patient was due for routine checkup but due to current pandemic is not able to come to the office.    Brief HPI/ROS obtained as follows:    Insomnia-ongoing.  He reports he has not received his Remeron in at least a month.  He reports his anxiety has been more increased and BuSpar is not helping.  He reports he is agitated due to lack of sleep.   Ankle pain-continues.  He has been fitted for a brace.  He is under care of Dr Florez.  He reports he has fallen 2 times due to ankle weakness.    He reports he does not have a follow up at present time.  He had been under care of PT but PT is presently closed.  He reports he continues to have popping in his ankle joint and generalized weakness.   He continues to have heel pain but did not report at his last orthopedic office.   Back and knee pain-chronic and ongoing.  He is presently on his Norco 10 mg BID.  No negative side effects.   He reports his meds are not helping as much as they previously did.    Asthma-reports inhaler of Breo did not help his symptoms.  He has been on Advair but he was stopped due to formulary changes and \"blisters in my mouth\".    Sleep Apnea-reports he has noted some increase in apnea.  His last study was approx 10 years ago.  He is not treated with Pap therapy.     Review of Systems   Constitutional: Negative for appetite change, fatigue and unexpected weight change.   HENT: Negative for congestion, ear pain, nosebleeds, postnasal drip, rhinorrhea, sore throat, trouble swallowing and voice change.    Eyes: Negative for pain and visual disturbance.        Wears glasses   Respiratory: Positive for wheezing. Negative for cough and shortness of breath.    Cardiovascular: Positive for leg swelling (right leg since ankle fracture). Negative " for chest pain and palpitations.   Gastrointestinal: Negative for abdominal pain, blood in stool, constipation and diarrhea.   Endocrine: Negative for cold intolerance and polydipsia.   Genitourinary: Negative for difficulty urinating, flank pain and hematuria.   Musculoskeletal: Positive for arthralgias, back pain and gait problem. Negative for joint swelling and myalgias.   Skin: Positive for wound (top of right hand). Negative for color change and rash.   Allergic/Immunologic: Negative.    Neurological: Negative for dizziness, syncope, numbness and headaches.   Hematological: Negative.    Psychiatric/Behavioral: Positive for dysphoric mood and sleep disturbance. Negative for suicidal ideas. The patient is nervous/anxious.    All other systems reviewed and are negative.      The current allergy list and medication list was reviewed with patient for accuracy.     Assessment     Objective     Physical Exam     Patient alert and oriented.  Able to follow conversation without sounding breathless.  No obvious distress.  Thought processes congruent with conversation.  Answers and ask questions without difficulty.  Agitated sounding at times.     Problem List Items Addressed This Visit        Respiratory    Mild persistent asthma without complication    Current Assessment & Plan     Continue under the care of asthma specialist.  Will attempt to send Advair HFA as patient has tolerated Advair in the past for asthma control.  Will not send powder inhalation due to patient's reports of mouth irritation and ulceration.  Continue PRN albuterol             Relevant Medications    albuterol sulfate  (90 Base) MCG/ACT inhaler    fluticasone-salmeterol (ADVAIR HFA) 115-21 MCG/ACT inhaler       Nervous and Auditory    Chronic bilateral low back pain with left-sided sciatica    Relevant Medications    HYDROcodone-acetaminophen (NORCO)  MG per tablet       Musculoskeletal and Integument    Chondromalacia, knee     Relevant Medications    HYDROcodone-acetaminophen (NORCO)  MG per tablet      Other Visit Diagnoses     Obstructive sleep apnea    -  Primary    Relevant Orders    Home Sleep Study    Primary insomnia        Relevant Medications    mirtazapine (REMERON) 30 MG tablet    busPIRone (BUSPAR) 15 MG tablet            Patient instructed and advised to call if symptoms are increasing or new symptoms occur.    Understands reasons for urgent and emergent care.  Patient (& family) verbalized agreement for treatment plan.     Generalized precautions advised including social distancing, hand washing, cough/sneeze hygiene.     [facility] #[id number] reviewed today and consistent.  Will refill prescribed controlled medication today.  Patient is aware they cannot receive narcotics from any other provider except if under care of pain management or speciality clinic.  Risk and benefits of medication use has been reviewed.  History and physical exam exhibit continued safe and appropriate use of controlled substances.  The patient is aware of the potential for addiction and dependence.  This patient has been made aware of the appropriate use of such medications, including potential risk of somnolence, limited ability to drive and / or work safely, and potential for overdose.    It has also been made clear that these medications are for use by this patient only, without concomitant use of alcohol or other substances unless prescribed/advised by medical provider.  Patient understands they may be subject to UDS and pill counts at random.      Patient considered to be low risk for addiction due to use of single controlled medications.  Patient understands and accepts these risks.  Patient need for medication will be reassessed at each visit.  Doses will be adjusted according to patient need and findings.    Goal of TX: Patient will not have any adverse reactions of medication.  Patient have reduction in pain symptoms with use of PRN Norco  as directed.  Patient will not have any adverse side effects from medication.  Patient will be able to remain active in an outside of home without interference from pain symptoms.    We will plan for an updated sleep study.  Change in patient's muscle relaxer to Norflex today.    RTC 1 month, sooner if needed.          This visit has been rescheduled as a phone visit to comply with patient safety concerns in accordance with CDC recommendations. Total time of discussion was 40 minutes.          This document has been electronically signed by:  BALDOMERO Thao, NAOMI

## 2020-04-20 ENCOUNTER — OFFICE VISIT (OUTPATIENT)
Dept: GASTROENTEROLOGY | Facility: CLINIC | Age: 50
End: 2020-04-20

## 2020-04-20 VITALS — BODY MASS INDEX: 35.63 KG/M2 | WEIGHT: 227 LBS | HEIGHT: 67 IN

## 2020-04-20 DIAGNOSIS — K21.9 GASTROESOPHAGEAL REFLUX DISEASE, ESOPHAGITIS PRESENCE NOT SPECIFIED: Primary | ICD-10-CM

## 2020-04-20 DIAGNOSIS — K59.04 CHRONIC IDIOPATHIC CONSTIPATION: ICD-10-CM

## 2020-04-20 PROCEDURE — 99213 OFFICE O/P EST LOW 20 MIN: CPT | Performed by: PHYSICIAN ASSISTANT

## 2020-04-20 NOTE — PROGRESS NOTES
: 1970    Chief Complaint   Patient presents with   • Abdominal Pain     You have chosen to receive care through a telephone visit. Do you consent to use a telephone visit for your medical care today? Yes    Jaquan Mckay is a 49 y.o. male who presents to telephone visit today as a follow up appointment regarding Abdominal Pain.    History of Present Illness:  His GERD has greatly improved with Dexilant 60 mg once daily. He is not taking carafate per his report. Previously, he was taking Protonix and Pepcid with severe heartburn. Previously was complaining of diarrhea, now stools are constipated, occurring every 3 days and are hard and painful. Stools are green in color. He has taken OTC laxatives including miralax in the past without relief.     Review of Systems   Constitutional: Negative for chills, fatigue and fever.   HENT: Negative for trouble swallowing.    Eyes: Negative.    Respiratory: Positive for cough and shortness of breath. Negative for choking and chest tightness.    Cardiovascular: Positive for chest pain.   Gastrointestinal: Positive for abdominal pain. Negative for abdominal distention, anal bleeding, blood in stool, constipation, diarrhea, nausea, rectal pain and vomiting.   Endocrine: Negative.    Genitourinary: Negative for difficulty urinating.   Musculoskeletal: Positive for back pain. Negative for neck pain.   Skin: Negative for color change, pallor, rash and wound.   Allergic/Immunologic: Negative for environmental allergies and food allergies.   Neurological: Positive for headaches. Negative for dizziness and light-headedness.   Hematological: Does not bruise/bleed easily.   Psychiatric/Behavioral: Negative.      I have reviewed and confirmed the accuracy of the ROS as documented by the MA/LPN/RN Chetna Chamberlain PA-C    Past Medical History:   Diagnosis Date   • Allergic    • Anxiety    • Arthritis    • Asthma    • Body piercing     REPORTS CYLICONE IN EARS   • Clotting disorder  "(CMS/[facility]) 2004    had a knee surgery   • Depression    • DVT (deep venous thrombosis) (CMS/[facility])     RIGHT RIGHT KNEE AFTER SURGERY YEARS AGO IN 2001 OR 2004   • Gastric ulcer    • GERD (gastroesophageal reflux disease)    • H/O migraine    • H/O sleep apnea     REPORTS DIAGNOSED WITH SLEEP APNEA AND COULDN'T STAND TO WEAR THE MACHINE   • Headache    • Heart attack (CMS/[facility])     REPORTS \"LIGHT HEART ATTACK A LONG TIME AGO\"  \"EARLY 90'S\"   • History of seizures     REPORTS LAST EPISODE WAS AROUND 1995.   • Hostility    • Hyperlipidemia    • Hypertension    • Knee pain, acute     Left   • Low back pain    • Migraine    • MRSA (methicillin resistant Staphylococcus aureus)     REPORTS LAST TESTED + 2004. WAS TREATED HE REPORTS.  RIGHT ARM, RIGHT KNEE.   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    • Obesity    • Poor historian    • Carl [place] spotted fever    • Seizures (CMS/[facility])    • Tattoo    • Wears glasses        Past Surgical History:   Procedure Laterality Date   • BACK SURGERY     • BRAIN SURGERY  1986    Tumor removal    • CARDIAC CATHETERIZATION N/A 9/28/2018    Procedure: Left Heart Cath;  Surgeon: Leandro Daily MD;  Location:  [facility] CATH INVASIVE LOCATION;  Service: Cardiology   • CHOLECYSTECTOMY     • CYST REMOVAL     • KNEE ARTHROSCOPY Left 10/20/2017    Procedure: Diagnostic arthroscopy left knee with chondroplasty;  Surgeon: Marco Aguirre MD;  Location: [facility] OR;  Service:    • KNEE SURGERY Right    • MOUTH SURGERY      FULL MOUTH EXTRACTION   • OTHER SURGICAL HISTORY      REPORTS 7 TICKS REMOVED FROM RIGHT ARM IN 2001 OR 2002   • TUMOR EXCISION      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       Family History   Problem Relation Age of Onset   • Diabetes Mother    • Hypertension Mother    • Stroke Mother    • Diabetes Father    • Skin cancer Father    • Hypertension Father    • Heart attack Father    • Diabetes Brother    • Hypertension Brother    • Heart disease Maternal Aunt    • Heart disease Maternal Uncle  "   • Heart disease Paternal Aunt    • Heart disease Paternal Uncle    • Heart disease Maternal Grandmother    • Heart disease Maternal Grandfather    • Heart disease Paternal Grandmother    • Heart disease Paternal Grandfather        Social History     Socioeconomic History   • Marital status:      Spouse name: Becca   • Number of children: 2   • Years of education: 12   • Highest education level: Not on file   Occupational History   • Occupation: DISABLED   Social Needs   • Financial resource strain: Somewhat hard   • Food insecurity:     Worry: Sometimes true     Inability: Sometimes true   • Transportation needs:     Medical: No     Non-medical: No   Tobacco Use   • Smoking status: Current Every Day Smoker     Packs/day: 1.50     Years: 17.00     Pack years: 25.50     Types: Cigars, Cigarettes     Start date: 5/5/2010   • Smokeless tobacco: Never Used   • Tobacco comment: still uses 1.5 packs a day.   Substance and Sexual Activity   • Alcohol use: No   • Drug use: No   • Sexual activity: Defer   Lifestyle   • Physical activity:     Days per week: 0 days     Minutes per session: 0 min   • Stress: To some extent   Relationships   • Social connections:     Talks on phone: Once a week     Gets together: Once a week     Attends Anglican service: Never     Active member of club or organization: No     Attends meetings of clubs or organizations: Never     Relationship status:        Current Outpatient Medications:   •  ASPIRIN ADULT LOW STRENGTH 81 MG EC tablet, TAKE 1 TABLET BY MOUTH DAILY FOR HEART HEALTH AND CIRCULATION, Disp: 30 tablet, Rfl: 3  •  atorvastatin (LIPITOR) 40 MG tablet, Take 1 tablet by mouth Daily., Disp: 90 tablet, Rfl: 3  •  Blood Pressure Monitoring (HEALTH SENSE BP MONITOR) device, 1 each Daily., Disp: 1 each, Rfl: 0  •  busPIRone (BUSPAR) 15 MG tablet, Take 1 tablet by mouth 3 (Three) Times a Day., Disp: 90 tablet, Rfl: 2  •  dexlansoprazole (Dexilant) 60 MG capsule, Take 1  "capsule by mouth Daily., Disp: 30 capsule, Rfl: 5  •  DILANTIN 100 MG ER capsule, TAKE 2 CAPSULES BY MOUTH EVERY 12 HOURS AS DIRECTED, Disp: 120 capsule, Rfl: 5  •  diphenhydrAMINE (BENADRYL ALLERGY) 25 MG tablet, Take 1 tablet by mouth Every 6 (Six) Hours As Needed for Itching or Allergies., Disp: 30 tablet, Rfl: 1  •  famotidine (PEPCID) 40 MG tablet, 40 mg At Night As Needed for Heartburn., Disp: , Rfl:   •  HYDROcodone-acetaminophen (NORCO)  MG per tablet, Take 1 tablet by mouth 2 (Two) Times a Day As Needed for Moderate Pain ., Disp: 60 tablet, Rfl: 0  •  ibuprofen (ADVIL,MOTRIN) 800 MG tablet, , Disp: , Rfl:   •  lisinopril (PRINIVIL,ZESTRIL) 20 MG tablet, Take 2 tablets by mouth Daily. FOR BLOOD PRESSURE, Disp: 60 tablet, Rfl: 5  •  loratadine (CLARITIN) 10 MG tablet, Take 1 tablet by mouth Daily As Needed for Allergies., Disp: 30 tablet, Rfl: 5  •  mirtazapine (REMERON) 30 MG tablet, Take 0.5-1 tablets by mouth Every Night. 1 tablet 2-3 hours before bedtime, Disp: 60 tablet, Rfl: 5  •  Misc. Devices (BATH/SHOWER SEAT) misc, 1 each Daily., Disp: 1 each, Rfl: 0  •  montelukast (SINGULAIR) 10 MG tablet, Take 1 tablet by mouth Every Night., Disp: 30 tablet, Rfl: 5  •  potassium chloride (K-DUR) 10 MEQ CR tablet, , Disp: , Rfl:   •  vitamin D (ERGOCALCIFEROL) 1.25 MG (16163 UT) capsule capsule, Take 1 capsule by mouth Every 7 (Seven) Days. On Friday, Disp: 5 capsule, Rfl: 5    Allergies:   Ciprofloxacin; Mobic [meloxicam]; Paxil [paroxetine hcl]; Peanut-containing drug products; Penicillins; Pristiq [desvenlafaxine succinate er]; Sulfa antibiotics; Doxycycline; Fish-derived products; Isosorbide nitrate; Clarithromycin; Clindamycin/lincomycin; Contrast dye; Diltiazem; Gabapentin; Keflex [cephalexin]; Metoprolol; Prednisone; Robitussin cough+ chest max st [dextromethorphan-guaifenesin]; Shrimp (diagnostic); Spironolactone; Viibryd [vilazodone hcl]; and Zoloft [sertraline hcl]    Vitals:  Ht 170.2 cm (67\")   " Wt 103 kg (227 lb)   BMI 35.55 kg/m²   Not all vitals taken- telephone visit    Physical Exam   Constitutional: He is oriented to person, place, and time.   HENT:   Voice normal, has lisp   Pulmonary/Chest: No respiratory distress.   Neurological: He is alert and oriented to person, place, and time.   Psychiatric: He has a normal mood and affect.   Audio only    Results Review:  abd film showed some constipation 3/2020    Assessment:  1. Gastroesophageal reflux disease, esophagitis presence not specified    2. Chronic idiopathic constipation      Plan:  Continue Dexilant 60 mg once daily for GERD.     Start taking Linzess 72 mcg for relief of constipation. This should be taken as directed; 1 tab PO once daily, 30 minutes before meals on an empty stomach. Linzess is a secretory stimulant (guanylate cyclase agonist) used to treat constipation by binding to GC-C and acting locally on the luminal surface of the intestinal epithelium. Activation of GC-C results in an increase in intra/extracellular concentrations of cGMP which stimulates secretion of chloride and bicarbonate into the intestinal lumen. This results in increased intestinal fluid and accelerated transit.     New Medications Ordered This Visit   Medications   • linaclotide (Linzess) 72 MCG capsule capsule     Sig: Take 1 capsule by mouth Every Morning Before Breakfast. Wait 30 mins before eating.     Dispense:  30 capsule     Refill:  5         This visit has been rescheduled as a phone visit to comply with patient safety concerns in accordance with CDC recommendations. Total time of discussion was 12 minutes.    Return in about 1 month (around 5/20/2020) for recheck constipation.      Electronically signed 4/20/2020 12:59  Chetna Chamberlain PA-C, [facility]

## 2020-05-01 ENCOUNTER — TELEPHONE (OUTPATIENT)
Dept: GASTROENTEROLOGY | Facility: CLINIC | Age: 50
End: 2020-05-01

## 2020-05-01 ENCOUNTER — OFFICE VISIT (OUTPATIENT)
Dept: GASTROENTEROLOGY | Facility: CLINIC | Age: 50
End: 2020-05-01

## 2020-05-01 VITALS — HEIGHT: 67 IN | BODY MASS INDEX: 35.79 KG/M2 | WEIGHT: 228 LBS

## 2020-05-01 DIAGNOSIS — K59.04 CHRONIC IDIOPATHIC CONSTIPATION: Primary | ICD-10-CM

## 2020-05-01 PROCEDURE — 99442 PR PHYS/QHP TELEPHONE EVALUATION 11-20 MIN: CPT | Performed by: PHYSICIAN ASSISTANT

## 2020-05-01 RX ORDER — LACTULOSE 10 G/15ML
20 SOLUTION ORAL 2 TIMES DAILY
Qty: 1892 ML | Refills: 0 | Status: SHIPPED | OUTPATIENT
Start: 2020-05-01 | End: 2021-01-08

## 2020-05-01 NOTE — TELEPHONE ENCOUNTER
Pt states that the medication that Chetna gave him today is not covered by his insurance. He called in demanding to speak to Chetna. I explained that I will let Chetna know it is not covered. Pt voiced understanding.    Pts number is [phone]

## 2020-05-01 NOTE — PROGRESS NOTES
: 1970    Chief Complaint   Patient presents with   • Constipation   • Abdominal Pain     You have chosen to receive care through a telephone visit. Do you consent to use a telephone visit for your medical care today? Yes    Jaquan Mckay is a 49 y.o. male who presents to telephone visit today as a follow up appointment regarding Constipation and Abdominal Pain.    History of Present Illness:  He reports that he is very upset today because he has severe bloating and generalized abdominal discomfort related to it. He states that he never had this discomfort prior to taking Linzess. He has been taking Linzess 72 mcg once daily since his last office visit 2 weeks ago until yesterday when he threw the medication away because of his complaints. He states that he has not had a BM in the past 3-4 days. He reports that on his own palpation, abdomen is very hard. He discovered after his last office visit that he had already tried samples of Linzess from his PCP in the past but did not mention it last visit because he had forgotten about it. Previously, he was having BMs every 3 days which were hard. He states when he first started the Linzess, it did not cause diarrhea or help him have BMs at all. He has been taking opioid pain medications for over 2 years, states that he does not get any pain relief from them and they could not be affecting his bowels. He states that he knows his body and knows that his constipation and abdominal pain has nothing to do with his long term opioid use.     Review of Systems   Constitutional: Negative for chills, fatigue and fever.   HENT: Negative for trouble swallowing.    Eyes: Negative.    Respiratory: Positive for cough and shortness of breath. Negative for choking and chest tightness.    Cardiovascular: Positive for chest pain.   Gastrointestinal: Positive for abdominal pain, constipation and nausea. Negative for abdominal distention, anal bleeding, blood in stool, diarrhea,  "rectal pain and vomiting.   Endocrine: Negative.    Genitourinary: Negative for difficulty urinating.   Musculoskeletal: Positive for back pain. Negative for neck pain.   Skin: Negative for color change, pallor, rash and wound.   Allergic/Immunologic: Negative for environmental allergies and food allergies.   Neurological: Positive for headaches. Negative for dizziness and light-headedness.   Hematological: Does not bruise/bleed easily.   Psychiatric/Behavioral: Negative.      I have reviewed and confirmed the accuracy of the ROS as documented by the MA/LPN/RN Chetna Chamberlain PA-C    Past Medical History:   Diagnosis Date   • Allergic    • Anxiety    • Arthritis    • Asthma    • Body piercing     REPORTS CYLICONE IN EARS   • Clotting disorder (CMS/[facility]CC) 2004    had a knee surgery   • Depression    • DVT (deep venous thrombosis) (CMS/[facility])     RIGHT RIGHT KNEE AFTER SURGERY YEARS AGO IN 2001 OR 2004   • Gastric ulcer    • GERD (gastroesophageal reflux disease)    • H/O migraine    • H/O sleep apnea     REPORTS DIAGNOSED WITH SLEEP APNEA AND COULDN'T STAND TO WEAR THE MACHINE   • Headache    • Heart attack (CMS/[facility])     REPORTS \"LIGHT HEART ATTACK A LONG TIME AGO\"  \"EARLY 90'S\"   • History of seizures     REPORTS LAST EPISODE WAS AROUND 1995.   • Hostility    • Hyperlipidemia    • Hypertension    • Knee pain, acute     Left   • Low back pain    • Migraine    • MRSA (methicillin resistant Staphylococcus aureus)     REPORTS LAST TESTED + 2004. WAS TREATED HE REPORTS.  RIGHT ARM, RIGHT KNEE.   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    • Obesity    • Poor historian    • [place] spotted fever    • Seizures (CMS/HCC)    • Tattoo    • Wears glasses        Past Surgical History:   Procedure Laterality Date   • BACK SURGERY     • BRAIN SURGERY  1986    Tumor removal    • CARDIAC CATHETERIZATION N/A 9/28/2018    Procedure: Left Heart Cath;  Surgeon: Leandro Daily MD;  Location: [facility] CATH INVASIVE LOCATION;  Service: Cardiology   • " CHOLECYSTECTOMY     • CYST REMOVAL     • KNEE ARTHROSCOPY Left 10/20/2017    Procedure: Diagnostic arthroscopy left knee with chondroplasty;  Surgeon: Marco Aguirre MD;  Location: [facility];  Service:    • KNEE SURGERY Right    • MOUTH SURGERY      FULL MOUTH EXTRACTION   • OTHER SURGICAL HISTORY      REPORTS 7 TICKS REMOVED FROM RIGHT ARM IN 2001 OR 2002   • TUMOR EXCISION      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       Family History   Problem Relation Age of Onset   • Diabetes Mother    • Hypertension Mother    • Stroke Mother    • Diabetes Father    • Skin cancer Father    • Hypertension Father    • Heart attack Father    • Diabetes Brother    • Hypertension Brother    • Heart disease Maternal Aunt    • Heart disease Maternal Uncle    • Heart disease Paternal Aunt    • Heart disease Paternal Uncle    • Heart disease Maternal Grandmother    • Heart disease Maternal Grandfather    • Heart disease Paternal Grandmother    • Heart disease Paternal Grandfather        Social History     Socioeconomic History   • Marital status:      Spouse name: Becca   • Number of children: 2   • Years of education: 12   • Highest education level: Not on file   Occupational History   • Occupation: DISABLED   Social Needs   • Financial resource strain: Somewhat hard   • Food insecurity:     Worry: Sometimes true     Inability: Sometimes true   • Transportation needs:     Medical: No     Non-medical: No   Tobacco Use   • Smoking status: Current Every Day Smoker     Packs/day: 1.50     Years: 17.00     Pack years: 25.50     Types: Cigars, Cigarettes     Start date: 5/5/2010   • Smokeless tobacco: Never Used   • Tobacco comment: still uses 1.5 packs a day.   Substance and Sexual Activity   • Alcohol use: No   • Drug use: No   • Sexual activity: Defer   Lifestyle   • Physical activity:     Days per week: 0 days     Minutes per session: 0 min   • Stress: To some extent   Relationships   • Social connections:     Talks  on phone: Once a week     Gets together: Once a week     Attends Rastafari service: Never     Active member of club or organization: No     Attends meetings of clubs or organizations: Never     Relationship status:        Current Outpatient Medications:   •  ASPIRIN ADULT LOW STRENGTH 81 MG EC tablet, TAKE 1 TABLET BY MOUTH DAILY FOR HEART HEALTH AND CIRCULATION, Disp: 30 tablet, Rfl: 3  •  atorvastatin (LIPITOR) 40 MG tablet, Take 1 tablet by mouth Daily., Disp: 90 tablet, Rfl: 3  •  Blood Pressure Monitoring (HEALTH SENSE BP MONITOR) device, 1 each Daily., Disp: 1 each, Rfl: 0  •  busPIRone (BUSPAR) 15 MG tablet, Take 1 tablet by mouth 3 (Three) Times a Day., Disp: 90 tablet, Rfl: 2  •  dexlansoprazole (Dexilant) 60 MG capsule, Take 1 capsule by mouth Daily., Disp: 30 capsule, Rfl: 5  •  DILANTIN 100 MG ER capsule, TAKE 2 CAPSULES BY MOUTH EVERY 12 HOURS AS DIRECTED, Disp: 120 capsule, Rfl: 5  •  diphenhydrAMINE (BENADRYL ALLERGY) 25 MG tablet, Take 1 tablet by mouth Every 6 (Six) Hours As Needed for Itching or Allergies., Disp: 30 tablet, Rfl: 1  •  famotidine (PEPCID) 40 MG tablet, 40 mg At Night As Needed for Heartburn., Disp: , Rfl:   •  HYDROcodone-acetaminophen (NORCO)  MG per tablet, Take 1 tablet by mouth 2 (Two) Times a Day As Needed for Moderate Pain ., Disp: 60 tablet, Rfl: 0  •  ibuprofen (ADVIL,MOTRIN) 800 MG tablet, , Disp: , Rfl:   •  linaclotide (Linzess) 72 MCG capsule capsule, Take 1 capsule by mouth Every Morning Before Breakfast. Wait 30 mins before eating., Disp: 30 capsule, Rfl: 5  •  lisinopril (PRINIVIL,ZESTRIL) 20 MG tablet, Take 2 tablets by mouth Daily. FOR BLOOD PRESSURE, Disp: 60 tablet, Rfl: 5  •  loratadine (CLARITIN) 10 MG tablet, Take 1 tablet by mouth Daily As Needed for Allergies., Disp: 30 tablet, Rfl: 5  •  mirtazapine (REMERON) 30 MG tablet, Take 0.5-1 tablets by mouth Every Night. 1 tablet 2-3 hours before bedtime, Disp: 60 tablet, Rfl: 5  •  Misc. Devices  "(BATH/SHOWER SEAT) misc, 1 each Daily., Disp: 1 each, Rfl: 0  •  montelukast (SINGULAIR) 10 MG tablet, Take 1 tablet by mouth Every Night., Disp: 30 tablet, Rfl: 5  •  potassium chloride (K-DUR) 10 MEQ CR tablet, , Disp: , Rfl:   •  vitamin D (ERGOCALCIFEROL) 1.25 MG (62806 UT) capsule capsule, Take 1 capsule by mouth Every 7 (Seven) Days. On Friday, Disp: 5 capsule, Rfl: 5    Allergies:   Ciprofloxacin; Mobic [meloxicam]; Paxil [paroxetine hcl]; Peanut-containing drug products; Penicillins; Pristiq [desvenlafaxine succinate er]; Sulfa antibiotics; Doxycycline; Fish-derived products; Isosorbide nitrate; Clarithromycin; Clindamycin/lincomycin; Contrast dye; Diltiazem; Gabapentin; Keflex [cephalexin]; Metoprolol; Prednisone; Robitussin cough+ chest max st [dextromethorphan-guaifenesin]; Shrimp (diagnostic); Spironolactone; Viibryd [vilazodone hcl]; and Zoloft [sertraline hcl]    Vitals:  Ht 170.2 cm (67\")   Wt 103 kg (228 lb)   BMI 35.71 kg/m²   Not all vitals taken- telephone visit    Physical Exam   Constitutional: He is oriented to person, place, and time. He appears distressed.   HENT:   lisp   Pulmonary/Chest: No respiratory distress.   Neurological: He is alert and oriented to person, place, and time.   Psychiatric:   Anxious affect   Audio only    Assessment:  1. Chronic idiopathic constipation      Plan:  Offered magnesium citrate bowel cleanse but he declined because he had taken 1 bottle in the remote past without relief. He will discontinue Linzess 72 mcg due to inefficacy. He was very upset today about his current symptoms, I told him that I was trying to help him and thought his abdominal bloating and pain was related to his constipation more than a side effect of the medication. I also discussed with him the effects that opioids have on the intestines long term. He declined OIC treatment.     Start taking Trulance 3 mg once daily with or without food for treatment of constipation. Samples were given " today. Trulance is a secretory stimulant (guanylate cyclase agonist) used to treat constipation by acting like the natural uroguanylin which helps to provide the appropriate amount of fluid in the intestines. Also, he will take lactulose at his request for hopefully immediate relief of constipation.     New Medications Ordered This Visit   Medications   • lactulose (CHRONULAC) 10 GM/15ML solution     Sig: Take 30 mL by mouth 2 (Two) Times a Day.     Dispense:  1892 mL     Refill:  0   • Plecanatide 3 MG tablet     Sig: Take 1 tablet by mouth Daily.     Dispense:  30 tablet     Refill:  5         This visit has been rescheduled as a phone visit to comply with patient safety concerns in accordance with CDC recommendations. Total time of discussion was 15 minutes.    Keep scheduled appt to re-check constipation.    Electronically signed 5/1/2020 11:19  Chetna Chamberlain PA-C, [facility]

## 2020-05-01 NOTE — TELEPHONE ENCOUNTER
I submitted a PA for medication sent and let patient know.    I left 4 boxes of Trulance  At the from for him to .

## 2020-05-10 ENCOUNTER — APPOINTMENT (OUTPATIENT)
Dept: CT IMAGING | Facility: HOSPITAL | Age: 50
End: 2020-05-10

## 2020-05-10 ENCOUNTER — APPOINTMENT (OUTPATIENT)
Dept: GENERAL RADIOLOGY | Facility: HOSPITAL | Age: 50
End: 2020-05-10

## 2020-05-10 ENCOUNTER — HOSPITAL ENCOUNTER (EMERGENCY)
Facility: HOSPITAL | Age: 50
Discharge: HOME | End: 2020-05-10
Attending: EMERGENCY MEDICINE | Admitting: EMERGENCY MEDICINE

## 2020-05-10 VITALS
WEIGHT: 225 LBS | SYSTOLIC BLOOD PRESSURE: 147 MMHG | TEMPERATURE: 98.2 F | RESPIRATION RATE: 18 BRPM | HEART RATE: 87 BPM | BODY MASS INDEX: 35.31 KG/M2 | DIASTOLIC BLOOD PRESSURE: 76 MMHG | OXYGEN SATURATION: 99 % | HEIGHT: 67 IN

## 2020-05-10 DIAGNOSIS — S13.9XXA NECK SPRAIN, INITIAL ENCOUNTER: ICD-10-CM

## 2020-05-10 DIAGNOSIS — S00.03XA CONTUSION OF SCALP, INITIAL ENCOUNTER: ICD-10-CM
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
## 2020-05-10 PROCEDURE — 99282 EMERGENCY DEPT VISIT SF MDM: CPT

## 2020-05-10 PROCEDURE — 72040 X-RAY EXAM NECK SPINE 2-3 VW: CPT

## 2020-05-10 PROCEDURE — 70450 CT HEAD/BRAIN W/O DYE: CPT

## 2020-05-10 PROCEDURE — 25010000002 ORPHENADRINE CITRATE PER 60 MG: Performed by: EMERGENCY MEDICINE

## 2020-05-10 RX ORDER — ORPHENADRINE CITRATE 30 MG/ML
60 INJECTION INTRAMUSCULAR; INTRAVENOUS ONCE
Status: DISCONTINUED | OUTPATIENT
Start: 2020-05-10 | End: 2020-05-10 | Stop reason: HOSPADM

## 2020-05-10 RX ORDER — CYCLOBENZAPRINE HCL 10 MG
10 TABLET ORAL 3 TIMES DAILY PRN
Qty: 30 TABLET | Refills: 0 | Status: SHIPPED | OUTPATIENT
Start: 2020-05-10 | End: 2020-07-21

## 2020-05-12 ENCOUNTER — OFFICE VISIT (OUTPATIENT)
Dept: FAMILY MEDICINE CLINIC | Facility: CLINIC | Age: 50
End: 2020-05-12

## 2020-05-12 DIAGNOSIS — K59.04 CHRONIC IDIOPATHIC CONSTIPATION: Primary | ICD-10-CM

## 2020-05-12 DIAGNOSIS — M54.42 CHRONIC BILATERAL LOW BACK PAIN WITH LEFT-SIDED SCIATICA: ICD-10-CM

## 2020-05-12 DIAGNOSIS — K21.9 GASTROESOPHAGEAL REFLUX DISEASE WITHOUT ESOPHAGITIS: ICD-10-CM

## 2020-05-12 DIAGNOSIS — F41.9 ANXIETY: ICD-10-CM

## 2020-05-12 DIAGNOSIS — M94.262 CHONDROMALACIA OF LEFT KNEE: ICD-10-CM

## 2020-05-12 DIAGNOSIS — G89.29 CHRONIC BILATERAL LOW BACK PAIN WITH LEFT-SIDED SCIATICA: ICD-10-CM

## 2020-05-12 PROCEDURE — 99213 OFFICE O/P EST LOW 20 MIN: CPT | Performed by: NURSE PRACTITIONER

## 2020-05-12 RX ORDER — HYDROCODONE BITARTRATE AND ACETAMINOPHEN 10; 325 MG/1; MG/1
1 TABLET ORAL 2 TIMES DAILY PRN
Qty: 60 TABLET | Refills: 0 | Status: SHIPPED | OUTPATIENT
Start: 2020-05-12 | End: 2020-06-10 | Stop reason: SDUPTHER

## 2020-05-12 NOTE — ED PROVIDER NOTES
"Subjective   49-year-old male in the emergency department after falling getting into his bathtub earlier this evening and hitting the back of his head.  Patient is unaware if he lost consciousness.  Denies nausea, vomiting, diarrhea, fever, or chills.  Denies blurry vision.  Patient reports he does have a headache and an obvious bump to the back of his head.  Patient has extensive past medical history so please refer to the chart.      History provided by:  Patient   used: No        Review of Systems   Constitutional: Negative.    Eyes: Negative.    Respiratory: Negative.    Cardiovascular: Negative.    Gastrointestinal: Negative.    Endocrine: Negative.    Genitourinary: Negative.    Musculoskeletal: Negative.    Skin: Negative.    Allergic/Immunologic: Negative.    Neurological: Positive for headaches.   Hematological: Negative.    Psychiatric/Behavioral: Negative.    All other systems reviewed and are negative.      Past Medical History:   Diagnosis Date   • Allergic    • Anxiety    • Arthritis    • Asthma    • Body piercing     REPORTS CYLICONE IN EARS   • Clotting disorder (CMS/[facility]) 2004    had a knee surgery   • Depression    • DVT (deep venous thrombosis) (CMS/[facility])     RIGHT RIGHT KNEE AFTER SURGERY YEARS AGO IN 2001 OR 2004   • Gastric ulcer    • GERD (gastroesophageal reflux disease)    • H/O migraine    • H/O sleep apnea     REPORTS DIAGNOSED WITH SLEEP APNEA AND COULDN'T STAND TO WEAR THE MACHINE   • Headache    • Heart attack (CMS/[facility])     REPORTS \"LIGHT HEART ATTACK A LONG TIME AGO\"  \"EARLY 90'S\"   • History of seizures     REPORTS LAST EPISODE WAS AROUND 1995.   • Hostility    • Hyperlipidemia    • Hypertension    • Knee pain, acute     Left   • Low back pain    • Migraine    • MRSA (methicillin resistant Staphylococcus aureus)     REPORTS LAST TESTED + 2004. WAS TREATED HE REPORTS.  RIGHT ARM, RIGHT KNEE.   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    • Obesity    • Poor historian    • [place] " "spotted fever    • Seizures (CMS/HCC)    • Tattoo    • Wears glasses        Allergies   Allergen Reactions   • Ciprofloxacin Anaphylaxis and Hives   • Mobic [Meloxicam] Other (See Comments)     Pt states, \"It make my feet and hands go numb and I can't hardly walk.\"    • Paxil [Paroxetine Hcl] Shortness Of Breath     Chest pain    • Peanut-Containing Drug Products Anaphylaxis   • Penicillins Anaphylaxis   • Pristiq [Desvenlafaxine Succinate Er] Dizziness   • Sulfa Antibiotics Anaphylaxis, Itching and Rash   • Doxycycline Hives   • Fish-Derived Products Hives   • Isosorbide Nitrate Rash     Rash, hives, had to use inhaler.    • Clarithromycin Rash   • Clindamycin/Lincomycin Rash   • Contrast Dye Itching and Rash   • Diltiazem Rash   • Gabapentin Rash   • Keflex [Cephalexin] Rash   • Metoprolol Rash   • Prednisone Rash and Other (See Comments)     Face, feet, and legs go completely numb per patient   • Robitussin Cough+ Chest Max St [Dextromethorphan-Guaifenesin] Itching   • Shrimp (Diagnostic) Rash   • Spironolactone Rash   • Viibryd [Vilazodone Hcl] Itching and Rash   • Zoloft [Sertraline Hcl] Hives and Itching       Past Surgical History:   Procedure Laterality Date   • BACK SURGERY     • BRAIN SURGERY  1986    Tumor removal    • CARDIAC CATHETERIZATION N/A 9/28/2018    Procedure: Left Heart Cath;  Surgeon: Leandro Daily MD;  Location:  [facility];  Service: Cardiology   • CHOLECYSTECTOMY     • CYST REMOVAL     • KNEE ARTHROSCOPY Left 10/20/2017    Procedure: Diagnostic arthroscopy left knee with chondroplasty;  Surgeon: Marco Aguirre MD;  Location: [facility] OR;  Service:    • KNEE SURGERY Right    • MOUTH SURGERY      FULL MOUTH EXTRACTION   • OTHER SURGICAL HISTORY      REPORTS 7 TICKS REMOVED FROM RIGHT ARM IN 2001 OR 2002   • TUMOR EXCISION      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       Family History   Problem Relation Age of Onset   • Diabetes Mother    • Hypertension Mother    • " Stroke Mother    • Diabetes Father    • Skin cancer Father    • Hypertension Father    • Heart attack Father    • Diabetes Brother    • Hypertension Brother    • Heart disease Maternal Aunt    • Heart disease Maternal Uncle    • Heart disease Paternal Aunt    • Heart disease Paternal Uncle    • Heart disease Maternal Grandmother    • Heart disease Maternal Grandfather    • Heart disease Paternal Grandmother    • Heart disease Paternal Grandfather        Social History     Socioeconomic History   • Marital status:      Spouse name: Becca   • Number of children: 2   • Years of education: 12   • Highest education level: Not on file   Occupational History   • Occupation: DISABLED   Social Needs   • Financial resource strain: Somewhat hard   • Food insecurity:     Worry: Sometimes true     Inability: Sometimes true   • Transportation needs:     Medical: No     Non-medical: No   Tobacco Use   • Smoking status: Current Every Day Smoker     Packs/day: 1.50     Years: 17.00     Pack years: 25.50     Types: Cigars, Cigarettes     Start date: 5/5/2010   • Smokeless tobacco: Never Used   • Tobacco comment: still uses 1.5 packs a day.   Substance and Sexual Activity   • Alcohol use: No   • Drug use: No   • Sexual activity: Defer   Lifestyle   • Physical activity:     Days per week: 0 days     Minutes per session: 0 min   • Stress: To some extent   Relationships   • Social connections:     Talks on phone: Once a week     Gets together: Once a week     Attends Yars[employer] service: Never     Active member of club or organization: No     Attends meetings of clubs or organizations: Never     Relationship status:            Objective   Physical Exam   Constitutional: He is oriented to person, place, and time. He appears well-developed and well-nourished.  Non-toxic appearance. No distress.   HENT:   Head: Normocephalic and atraumatic.       Right Ear: External ear normal.   Left Ear: External ear normal.   Nose: Nose  normal.   Mouth/Throat: Oropharynx is clear and moist and mucous membranes are normal. No oropharyngeal exudate. No tonsillar exudate.   Eyes: Pupils are equal, round, and reactive to light. Conjunctivae, EOM and lids are normal.   Neck: Normal range of motion and full passive range of motion without pain. Neck supple. No thyromegaly present.   Cardiovascular: Normal rate, regular rhythm, S1 normal, S2 normal, normal heart sounds, intact distal pulses and normal pulses.   Pulmonary/Chest: Effort normal and breath sounds normal. No tachypnea. No respiratory distress. He has no decreased breath sounds. He has no wheezes. He has no rales. He exhibits no tenderness.   Abdominal: Soft. Normal appearance and bowel sounds are normal. He exhibits no distension. There is no tenderness. There is no rebound and no guarding.   Musculoskeletal: Normal range of motion. He exhibits no edema, tenderness or deformity.   Lymphadenopathy:     He has no cervical adenopathy.   Neurological: He is alert and oriented to person, place, and time. He has normal strength. No cranial nerve deficit or sensory deficit. GCS eye subscore is 4. GCS verbal subscore is 5. GCS motor subscore is 6.   Skin: Skin is warm, dry and intact. No rash noted. He is not diaphoretic. No erythema. No pallor.   Psychiatric: He has a normal mood and affect. His speech is normal and behavior is normal. Judgment and thought content normal. Cognition and memory are normal.   Nursing note and vitals reviewed.      Procedures           ED Course  ED Course as of May 11 2325   Sun May 10, 2020   0227 IMPRESSION:  Negative cervical spine.   XR Spine Cervical 2 View [ES]   0227 IMPRESSION:  Stable postoperative changes right temporal lobe. No acute findings.   CT Head Without Contrast [ES]      ED Course User Index  [ES] Edilson Smith MD                                           MDM  Number of Diagnoses or Management Options  Contusion of scalp, initial  encounter: new and requires workup  Fall, initial encounter: new and requires workup  Neck sprain, initial encounter: new and requires workup     Amount and/or Complexity of Data Reviewed  Tests in the radiology section of CPT®: reviewed and ordered  Independent visualization of images, tracings, or specimens: yes    Risk of Complications, Morbidity, and/or Mortality  Presenting problems: moderate  Diagnostic procedures: moderate  Management options: moderate    Patient Progress  Patient progress: stable      Final diagnoses:   Fall, initial encounter   Contusion of scalp, initial encounter   Neck sprain, initial encounter            Edilson Smith MD  05/11/20 6999

## 2020-05-12 NOTE — PROGRESS NOTES
"You have chosen to receive care through a telephone visit today. Do you consent to use a telephone visit for your medical care today? Yes    Establish patient makes contact with office of APRN to discuss health conditions.  Patient is due for routine checkup but due to current pandemic is not recommended to present to the office for face-to-face evaluation.      Patient refused to wear a mask for an in office visit today.      Chief Complaint   Patient presents with   • Constipation       Brief HPI/ROS obtained as follows:    Constipation-reports he has stopped Trulance due to abdominal bloating and cramping.  He reports he has noted abdominal pain and bloating.  He is taking Lactulose 30 mL daily.  He reports he took the meds last night but \"went about 12 times\".  He reports he had some black stool after straining. He reports \"My whole abdomen\".  He is under the care of Gastro.  He had a negative flat plate xray in March.    Fall-was getting into bathtub.  Happened 05/10/2020.  He reports he did hit his head.  He reports his head and neck are sore due to impact.  He reports he has \"bruising everywhere\".  He reports his foot was numb and he lost his balance.   Insomnia-reports continues to improve.  He is waking early but is at least sleeping 5-6 consecutive hours.  He is taking Remeron 30 mg and BuSpar 15 mg.    HTN-is not checking BP due to \"machine\".  No associated symptoms such as CP, SOA, HA, or dizziness.      Review of Systems   Constitutional: Negative for appetite change, fatigue and unexpected weight change.   HENT: Negative for congestion, ear pain, nosebleeds, postnasal drip, rhinorrhea, sore throat, trouble swallowing and voice change.    Eyes: Negative for photophobia, pain and visual disturbance.   Respiratory: Negative for cough, chest tightness, shortness of breath and wheezing.    Cardiovascular: Negative for chest pain and palpitations.   Gastrointestinal: Positive for abdominal distention, " abdominal pain and constipation. Negative for blood in stool, diarrhea and nausea.   Endocrine: Negative for cold intolerance and polydipsia.   Genitourinary: Negative for difficulty urinating, flank pain, frequency and hematuria.   Musculoskeletal: Positive for arthralgias, back pain, gait problem and myalgias. Negative for joint swelling.        Reports multiple falls   Skin: Negative for color change and rash.   Allergic/Immunologic: Negative.    Neurological: Positive for numbness (foot ). Negative for dizziness, syncope and headaches.   Hematological: Negative.    Psychiatric/Behavioral: Positive for agitation (about his health and COVID). Negative for dysphoric mood, sleep disturbance and suicidal ideas. The patient is not nervous/anxious.    All other systems reviewed and are negative.      The following portions of the patient's history were reviewed and updated as appropriate: CC, ROS, allergies, current medications, past family history, past medical history, past social history, past surgical history and problem list.    Assessment     Physical Exam     Patient alert and oriented.  Able to follow conversation without sounding breathless.  No obvious distress.  Thought processes congruent with conversation.  Answers and ask questions without difficulty.    Problem List Items Addressed This Visit        Digestive    Gastroesophageal reflux disease without esophagitis    Current Assessment & Plan     Continue meds as directed.  Advised to avoid known GI triggers such as spicy foods.  Upright 30 minutes after meals and avoid eating large meals.  Several small meals daily as able to avoid overfilling stomach.                Nervous and Auditory    Chronic bilateral low back pain with left-sided sciatica    Relevant Medications    HYDROcodone-acetaminophen (NORCO)  MG per tablet       Musculoskeletal and Integument    Chondromalacia, knee    Relevant Medications    HYDROcodone-acetaminophen (NORCO)   MG per tablet       Other    Anxiety    Current Assessment & Plan     With insomnia    Continue meds as directed.             Other Visit Diagnoses     Chronic idiopathic constipation    -  Primary    Relevant Medications    Naloxegol Oxalate (MOVANTIK) 25 MG tablet            Patient instructed and advised to call if symptoms are increasing or new symptoms occur.    Understands reasons for urgent and emergent care.  Patient (& family) verbalized agreement for treatment plan.     Generalized precautions advised including social distancing, hand washing, cough/sneeze hygiene.     [facility] #[id number] reviewed today and consistent.  Will refill prescribed controlled medication today.  Patient is aware they cannot receive narcotics from any other provider except if under care of pain management or speciality clinic.  Risk and benefits of medication use has been reviewed.  History and physical exam exhibit continued safe and appropriate use of controlled substances.  The patient is aware of the potential for addiction and dependence.  This patient has been made aware of the appropriate use of such medications, including potential risk of somnolence, limited ability to drive and / or work safely, and potential for overdose.    It has also been made clear that these medications are for use by this patient only, without concomitant use of alcohol or other substances unless prescribed/advised by medical provider.  Patient understands they may be subject to UDS and pill counts at random.      Patient considered to be low risk for addiction due to use of single controlled medications.  Patient understands and accepts these risks.  Patient need for medication will be reassessed at each visit.  Doses will be adjusted according to patient need and findings.    Goal of TX: Patient will not have any adverse reactions of medication.  Patient have reduction in pain symptoms with use of PRN Norco as directed.  Patient will not have any  adverse side effects from medication.  Patient will be able to remain active in an outside of home without interference from pain symptoms.    RTC 1 month, sooner if needed.       This visit has been rescheduled as a phone visit to comply with patient safety concerns in accordance with CDC recommendations. Total time of discussion was 16 minutes.        This document has been electronically signed by:  BALDOMERO Thao, NAOMI

## 2020-05-18 ENCOUNTER — OFFICE VISIT (OUTPATIENT)
Dept: GASTROENTEROLOGY | Facility: CLINIC | Age: 50
End: 2020-05-18

## 2020-05-18 VITALS
OXYGEN SATURATION: 98 % | TEMPERATURE: 98 F | HEART RATE: 76 BPM | DIASTOLIC BLOOD PRESSURE: 79 MMHG | SYSTOLIC BLOOD PRESSURE: 128 MMHG | BODY MASS INDEX: 45.62 KG/M2 | WEIGHT: 232.4 LBS | HEIGHT: 60 IN

## 2020-05-18 DIAGNOSIS — Z86.010 HISTORY OF COLON POLYPS: ICD-10-CM

## 2020-05-18 DIAGNOSIS — T40.2X5A THERAPEUTIC OPIOID-INDUCED CONSTIPATION (OIC): Primary | ICD-10-CM

## 2020-05-18 DIAGNOSIS — K59.03 THERAPEUTIC OPIOID-INDUCED CONSTIPATION (OIC): Primary | ICD-10-CM

## 2020-05-18 DIAGNOSIS — K62.5 RECTAL BLEEDING: ICD-10-CM

## 2020-05-18 DIAGNOSIS — R14.0 BLOATING: ICD-10-CM

## 2020-05-18 DIAGNOSIS — R10.84 GENERALIZED ABDOMINAL PAIN: ICD-10-CM

## 2020-05-18 PROCEDURE — 99214 OFFICE O/P EST MOD 30 MIN: CPT | Performed by: PHYSICIAN ASSISTANT

## 2020-05-18 RX ORDER — OLANZAPINE AND FLUOXETINE 12; 25 MG/1; MG/1
1 CAPSULE ORAL EVERY EVENING
COMMUNITY
End: 2020-07-21

## 2020-05-18 RX ORDER — METHOCARBAMOL 750 MG/1
750 TABLET, FILM COATED ORAL 4 TIMES DAILY PRN
COMMUNITY
End: 2020-06-10

## 2020-05-18 RX ORDER — ALBUTEROL SULFATE 90 UG/1
2 AEROSOL, METERED RESPIRATORY (INHALATION) EVERY 4 HOURS PRN
COMMUNITY
End: 2020-09-03 | Stop reason: SDUPTHER

## 2020-05-18 NOTE — PROGRESS NOTES
: 1970    Chief Complaint   Patient presents with   • Abdominal Pain   • Constipation       Jaquan Mckay is a 49 y.o. male who presents to the office today as a follow up appointment regarding Abdominal Pain and Constipation.    History of Present Illness:  He is still not feeling well today. Constipation is still not controlled, abdominal pain and bloating still present. He ate a 6 inch sub yesterday and was very uncomfortable after. It seems that everything he eats causes him to have severe bloating, even only a banana. He took the lactulose as directed but if he does not take 15 mL twice daily, he does not have any stools. If he takes the Trulance, he only has cramping. He talked with his PCP and she prescribed him Movantik, he has not started taking it yet, waiting for insurance approval. Tried Linzess 72 mcg once daily but did not like it because he had generalized abdominal pain while taking it. Had rectal bleeding las week, he is worried that something more than constipation is going on.    He has been taking opioid pain medications for over 2 years, states that he does not get any pain relief from them and they could not be affecting his bowels. He states that he knows his body and knows that his constipation and abdominal pain has nothing to do with his long term opioid use.     His last colonoscopy was approximately 5 years ago by Dr. Herrmann was normal but he does have a history of several colon polyps, reports 17 removed at one time.     Review of Systems   Constitutional: Negative for chills, fatigue and fever.   HENT: Negative for trouble swallowing.    Eyes: Negative.    Respiratory: Positive for cough and shortness of breath. Negative for choking and chest tightness.    Cardiovascular: Positive for chest pain.   Gastrointestinal: Positive for abdominal pain, anal bleeding, blood in stool, constipation and nausea. Negative for abdominal distention, diarrhea, rectal pain and vomiting.  "  Endocrine: Negative.    Genitourinary: Negative for difficulty urinating.   Musculoskeletal: Positive for back pain. Negative for neck pain.   Skin: Negative for color change, pallor, rash and wound.   Allergic/Immunologic: Negative for environmental allergies and food allergies.   Neurological: Positive for headaches. Negative for dizziness and light-headedness.   Hematological: Does not bruise/bleed easily.   Psychiatric/Behavioral: Negative.      I have reviewed and confirmed the accuracy of the ROS as documented by the MA/LPN/RN Chetna Chamberlain PA-C    Past Medical History:   Diagnosis Date   • Allergic    • Anxiety    • Arthritis    • Asthma    • Body piercing     REPORTS CYLICONE IN EARS   • Clotting disorder (CMS/HCC) 2004    had a knee surgery   • Depression    • DVT (deep venous thrombosis) (CMS/Formerly [facility])     RIGHT RIGHT KNEE AFTER SURGERY YEARS AGO IN 2001 OR 2004   • Gastric ulcer    • GERD (gastroesophageal reflux disease)    • H/O migraine    • H/O sleep apnea     REPORTS DIAGNOSED WITH SLEEP APNEA AND COULDN'T STAND TO WEAR THE MACHINE   • Headache    • Heart attack (CMS/Formerly [facility])     REPORTS \"LIGHT HEART ATTACK A LONG TIME AGO\"  \"EARLY 90'S\"   • History of seizures     REPORTS LAST EPISODE WAS AROUND 1995.   • Hostility    • Hyperlipidemia    • Hypertension    • Knee pain, acute     Left   • Low back pain    • Migraine    • MRSA (methicillin resistant Staphylococcus aureus)     REPORTS LAST TESTED + 2004. WAS TREATED HE REPORTS.  RIGHT ARM, RIGHT KNEE.   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    • Obesity    • Poor historian    • [place] spotted fever    • Seizures (CMS/HCC)    • Tattoo    • Wears glasses        Past Surgical History:   Procedure Laterality Date   • BACK SURGERY     • BRAIN SURGERY  1986    Tumor removal    • CARDIAC CATHETERIZATION N/A 9/28/2018    Procedure: Left Heart Cath;  Surgeon: Leandro Daily MD;  Location: [facility] CATH INVASIVE LOCATION;  Service: Cardiology   • CHOLECYSTECTOMY     • CYST " REMOVAL     • KNEE ARTHROSCOPY Left 10/20/2017    Procedure: Diagnostic arthroscopy left knee with chondroplasty;  Surgeon: Marco Aguirre MD;  Location: [facility];  Service:    • KNEE SURGERY Right    • MOUTH SURGERY      FULL MOUTH EXTRACTION   • OTHER SURGICAL HISTORY      REPORTS 7 TICKS REMOVED FROM RIGHT ARM IN 2001 OR 2002   • TUMOR EXCISION      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       Family History   Problem Relation Age of Onset   • Diabetes Mother    • Hypertension Mother    • Stroke Mother    • Diabetes Father    • Skin cancer Father    • Hypertension Father    • Heart attack Father    • Diabetes Brother    • Hypertension Brother    • Heart disease Maternal Aunt    • Heart disease Maternal Uncle    • Heart disease Paternal Aunt    • Heart disease Paternal Uncle    • Heart disease Maternal Grandmother    • Heart disease Maternal Grandfather    • Heart disease Paternal Grandmother    • Heart disease Paternal Grandfather        Social History     Socioeconomic History   • Marital status:      Spouse name: Becca   • Number of children: 2   • Years of education: 12   • Highest education level: Not on file   Occupational History   • Occupation: DISABLED   Social Needs   • Financial resource strain: Somewhat hard   • Food insecurity:     Worry: Sometimes true     Inability: Sometimes true   • Transportation needs:     Medical: No     Non-medical: No   Tobacco Use   • Smoking status: Current Every Day Smoker     Packs/day: 1.50     Years: 17.00     Pack years: 25.50     Types: Cigars, Cigarettes     Start date: 5/5/2010   • Smokeless tobacco: Never Used   • Tobacco comment: still uses 1.5 packs a day.   Substance and Sexual Activity   • Alcohol use: No   • Drug use: No   • Sexual activity: Defer   Lifestyle   • Physical activity:     Days per week: 0 days     Minutes per session: 0 min   • Stress: To some extent   Relationships   • Social connections:     Talks on phone: Once a week      Gets together: Once a week     Attends Jew service: Never     Active member of club or organization: No     Attends meetings of clubs or organizations: Never     Relationship status:        Current Outpatient Medications:   •  albuterol sulfate  (90 Base) MCG/ACT inhaler, Inhale 2 puffs Every 4 (Four) Hours As Needed for Wheezing., Disp: , Rfl:   •  ASPIRIN ADULT LOW STRENGTH 81 MG EC tablet, TAKE 1 TABLET BY MOUTH DAILY FOR HEART HEALTH AND CIRCULATION, Disp: 30 tablet, Rfl: 3  •  atorvastatin (LIPITOR) 40 MG tablet, Take 1 tablet by mouth Daily., Disp: 90 tablet, Rfl: 3  •  Blood Pressure Monitoring (HEALTH SENSE BP MONITOR) device, 1 each Daily., Disp: 1 each, Rfl: 0  •  busPIRone (BUSPAR) 15 MG tablet, Take 1 tablet by mouth 3 (Three) Times a Day., Disp: 90 tablet, Rfl: 2  •  dexlansoprazole (Dexilant) 60 MG capsule, Take 1 capsule by mouth Daily., Disp: 30 capsule, Rfl: 5  •  DILANTIN 100 MG ER capsule, TAKE 2 CAPSULES BY MOUTH EVERY 12 HOURS AS DIRECTED, Disp: 120 capsule, Rfl: 5  •  diphenhydrAMINE (BENADRYL ALLERGY) 25 MG tablet, Take 1 tablet by mouth Every 6 (Six) Hours As Needed for Itching or Allergies., Disp: 30 tablet, Rfl: 1  •  HYDROcodone-acetaminophen (NORCO)  MG per tablet, Take 1 tablet by mouth 2 (Two) Times a Day As Needed for Moderate Pain ., Disp: 60 tablet, Rfl: 0  •  ibuprofen (ADVIL,MOTRIN) 800 MG tablet, , Disp: , Rfl:   •  lactulose (CHRONULAC) 10 GM/15ML solution, Take 30 mL by mouth 2 (Two) Times a Day., Disp: 1892 mL, Rfl: 0  •  lisinopril (PRINIVIL,ZESTRIL) 20 MG tablet, Take 2 tablets by mouth Daily. FOR BLOOD PRESSURE, Disp: 60 tablet, Rfl: 5  •  loratadine (CLARITIN) 10 MG tablet, Take 1 tablet by mouth Daily As Needed for Allergies., Disp: 30 tablet, Rfl: 5  •  methocarbamol (ROBAXIN) 750 MG tablet, Take 750 mg by mouth 4 (Four) Times a Day As Needed for Muscle Spasms., Disp: , Rfl:   •  Misc. Devices (BATH/SHOWER SEAT) misc, 1 each Daily., Disp: 1 each,  "Rfl: 0  •  montelukast (SINGULAIR) 10 MG tablet, Take 1 tablet by mouth Every Night., Disp: 30 tablet, Rfl: 5  •  OLANZapine-FLUoxetine (SYMBYAX) 12-25 MG per capsule, Take 1 capsule by mouth Every Evening., Disp: , Rfl:   •  vitamin D (ERGOCALCIFEROL) 1.25 MG (61372 UT) capsule capsule, Take 1 capsule by mouth Every 7 (Seven) Days. On Friday, Disp: 5 capsule, Rfl: 5  •  cyclobenzaprine (FLEXERIL) 10 MG tablet, Take 1 tablet by mouth 3 (Three) Times a Day As Needed for Muscle Spasms., Disp: 30 tablet, Rfl: 0  •  famotidine (PEPCID) 40 MG tablet, 40 mg At Night As Needed for Heartburn., Disp: , Rfl:   •  mirtazapine (REMERON) 30 MG tablet, Take 0.5-1 tablets by mouth Every Night. 1 tablet 2-3 hours before bedtime, Disp: 60 tablet, Rfl: 5  •  Naloxegol Oxalate (MOVANTIK) 25 MG tablet, Take 1 tablet by mouth Every Morning., Disp: 30 tablet, Rfl: 5  •  Plecanatide 3 MG tablet, Take 1 tablet by mouth Daily., Disp: 30 tablet, Rfl: 5  •  potassium chloride (K-DUR) 10 MEQ CR tablet, , Disp: , Rfl:     Allergies:   Ciprofloxacin; Mobic [meloxicam]; Paxil [paroxetine hcl]; Peanut-containing drug products; Penicillins; Pristiq [desvenlafaxine succinate er]; Sulfa antibiotics; Doxycycline; Fish-derived products; Isosorbide nitrate; Clarithromycin; Clindamycin/lincomycin; Contrast dye; Diltiazem; Gabapentin; Keflex [cephalexin]; Metoprolol; Prednisone; Robitussin cough+ chest max st [dextromethorphan-guaifenesin]; Shrimp (diagnostic); Spironolactone; Viibryd [vilazodone hcl]; and Zoloft [sertraline hcl]    Vitals:  /79   Pulse 76   Temp 98 °F (36.7 °C)   Ht 67 cm (26.38\")   Wt 105 kg (232 lb 6.4 oz)   SpO2 98%   .83 kg/m²     Physical Exam   Constitutional: He is oriented to person, place, and time. He appears well-developed and well-nourished. No distress.   [place] around his neck   HENT:   Head: Normocephalic and atraumatic.   Right Ear: External ear normal.   Left Ear: External ear normal.   Nose: Nose " normal.   Has lisp   Eyes: Conjunctivae and EOM are normal. Right eye exhibits no discharge. Left eye exhibits no discharge. No scleral icterus.   Neck: Normal range of motion. No tracheal deviation present.   Pulmonary/Chest: Effort normal. No respiratory distress.   Musculoskeletal: Normal range of motion. He exhibits no edema.   Neurological: He is alert and oriented to person, place, and time. Coordination normal.   Skin: No rash noted. He is not diaphoretic. No erythema. No pallor.   tattoos   Psychiatric: He has a normal mood and affect. His behavior is normal. Judgment and thought content normal.     Assessment:  1. Therapeutic opioid-induced constipation (OIC)    2. Rectal bleeding    3. Bloating    4. Generalized abdominal pain    5. History of colon polyps      Plan:  Orders Placed This Encounter   Procedures   • Follow Anesthesia Guidelines / Standing Orders   • Obtain Informed Consent     ESOPHAGOGASTRODUODENOSCOPY WITH BIOPSY CPT CODE: 72104 (N/A), COLONOSCOPY CPT CODE: 87937 (N/A)  He will need an esophagogastroduodenoscopy and colonoscopy performed with IV general sedation. All of the risks, benefits and alternatives of these procedures have been discussed with him, all of his questions have been answered and he has elected to proceed. He should follow up in the office after these procedures to discuss the results and further recommendations can be made at that time.    New Medications Ordered This Visit   Medications   • polyethylene glycol (GoLYTELY) 236 g solution     Sig: Starting at 6pm the day before procedure, drink 8 ounces every 30 minutes until all gone or stools are clear. May add flavor packet.     Dispense:  4000 mL     Refill:  0   • Naloxegol Oxalate (MOVANTIK) 25 MG tablet     Sig: Take 1 tablet by mouth Every Morning. On empty stomach, 1 hour before first meal.     Dispense:  30 tablet     Refill:  5           Return for follow up after procedure to discuss  results.      Electronically signed 5/18/2020 12:40  Chetna Chamberlain PA-C, [facility]

## 2020-05-21 ENCOUNTER — HOSPITAL ENCOUNTER (OUTPATIENT)
Facility: HOSPITAL | Age: 50
Setting detail: HOSPITAL OUTPATIENT SURGERY
End: 2020-05-21
Attending: INTERNAL MEDICINE | Admitting: INTERNAL MEDICINE

## 2020-05-21 PROBLEM — T40.2X5A THERAPEUTIC OPIOID-INDUCED CONSTIPATION (OIC): Status: ACTIVE | Noted: 2020-05-21

## 2020-05-21 PROBLEM — R14.0 BLOATING: Status: ACTIVE | Noted: 2020-05-21

## 2020-05-21 PROBLEM — K59.03 THERAPEUTIC OPIOID-INDUCED CONSTIPATION (OIC): Status: ACTIVE | Noted: 2020-05-21

## 2020-05-21 PROBLEM — Z86.0100 HISTORY OF COLON POLYPS: Status: ACTIVE | Noted: 2020-05-21

## 2020-05-21 PROBLEM — K62.5 RECTAL BLEEDING: Status: ACTIVE | Noted: 2020-05-21

## 2020-05-21 PROBLEM — R10.84 GENERALIZED ABDOMINAL PAIN: Status: ACTIVE | Noted: 2020-05-21

## 2020-05-21 PROBLEM — Z86.010 HISTORY OF COLON POLYPS: Status: ACTIVE | Noted: 2020-05-21

## 2020-05-22 ENCOUNTER — PRIOR AUTHORIZATION (OUTPATIENT)
Dept: FAMILY MEDICINE CLINIC | Facility: CLINIC | Age: 50
End: 2020-05-22

## 2020-05-28 ENCOUNTER — TRANSCRIBE ORDERS (OUTPATIENT)
Dept: ADMINISTRATIVE | Facility: HOSPITAL | Age: 50
End: 2020-05-28

## 2020-05-28 ENCOUNTER — TELEPHONE (OUTPATIENT)
Dept: GASTROENTEROLOGY | Facility: CLINIC | Age: 50
End: 2020-05-28

## 2020-05-28 DIAGNOSIS — Z01.818 PRE-OPERATIVE CLEARANCE: Primary | ICD-10-CM

## 2020-05-28 NOTE — TELEPHONE ENCOUNTER
Patient wants to reschedule procedure until after August. He said he is not going to have the COVID-19 test.

## 2020-05-29 ENCOUNTER — APPOINTMENT (OUTPATIENT)
Dept: LAB | Facility: HOSPITAL | Age: 50
End: 2020-05-29

## 2020-06-03 ENCOUNTER — TELEPHONE (OUTPATIENT)
Dept: GASTROENTEROLOGY | Facility: CLINIC | Age: 50
End: 2020-06-03

## 2020-06-03 DIAGNOSIS — K59.03 THERAPEUTIC OPIOID-INDUCED CONSTIPATION (OIC): Primary | ICD-10-CM

## 2020-06-03 DIAGNOSIS — T40.2X5A THERAPEUTIC OPIOID-INDUCED CONSTIPATION (OIC): Primary | ICD-10-CM

## 2020-06-03 NOTE — TELEPHONE ENCOUNTER
"Patient called and said that he has broken out in a rash from taking Movantik. He said \" he flushed them down the toilet\" He wants to know if he could please have something else instead.  "

## 2020-06-05 NOTE — TELEPHONE ENCOUNTER
I sent relistor for him    Pt has numerous drug allergies, also he is non compliant with medications given

## 2020-06-08 NOTE — TELEPHONE ENCOUNTER
Spoke with patient and let him know that Chetna sent a new medication into his pharmacy. He voiced understanding.

## 2020-06-09 ENCOUNTER — TELEPHONE (OUTPATIENT)
Dept: GASTROENTEROLOGY | Facility: CLINIC | Age: 50
End: 2020-06-09

## 2020-06-10 ENCOUNTER — RESULTS ENCOUNTER (OUTPATIENT)
Dept: FAMILY MEDICINE CLINIC | Facility: CLINIC | Age: 50
End: 2020-06-10

## 2020-06-10 ENCOUNTER — OFFICE VISIT (OUTPATIENT)
Dept: FAMILY MEDICINE CLINIC | Facility: CLINIC | Age: 50
End: 2020-06-10

## 2020-06-10 VITALS
RESPIRATION RATE: 16 BRPM | TEMPERATURE: 97.5 F | HEIGHT: 67 IN | WEIGHT: 232 LBS | DIASTOLIC BLOOD PRESSURE: 72 MMHG | SYSTOLIC BLOOD PRESSURE: 134 MMHG | OXYGEN SATURATION: 98 % | BODY MASS INDEX: 36.41 KG/M2 | HEART RATE: 97 BPM

## 2020-06-10 DIAGNOSIS — E78.2 MIXED HYPERLIPIDEMIA: ICD-10-CM

## 2020-06-10 DIAGNOSIS — G89.29 CHRONIC BILATERAL LOW BACK PAIN WITH LEFT-SIDED SCIATICA: ICD-10-CM

## 2020-06-10 DIAGNOSIS — J45.40 MODERATE PERSISTENT ASTHMA WITHOUT COMPLICATION: ICD-10-CM

## 2020-06-10 DIAGNOSIS — J45.30 MILD PERSISTENT ASTHMA WITHOUT COMPLICATION: Primary | ICD-10-CM

## 2020-06-10 DIAGNOSIS — M54.42 CHRONIC BILATERAL LOW BACK PAIN WITH LEFT-SIDED SCIATICA: ICD-10-CM

## 2020-06-10 DIAGNOSIS — G40.909 SEIZURE DISORDER (HCC): ICD-10-CM

## 2020-06-10 DIAGNOSIS — E66.09 CLASS 1 OBESITY DUE TO EXCESS CALORIES WITH SERIOUS COMORBIDITY AND BODY MASS INDEX (BMI) OF 33.0 TO 33.9 IN ADULT: ICD-10-CM

## 2020-06-10 DIAGNOSIS — M94.262 CHONDROMALACIA OF LEFT KNEE: ICD-10-CM

## 2020-06-10 DIAGNOSIS — R07.9 CHEST PAIN, UNSPECIFIED TYPE: ICD-10-CM

## 2020-06-10 DIAGNOSIS — J45.20 MILD INTERMITTENT ASTHMA WITHOUT COMPLICATION: ICD-10-CM

## 2020-06-10 DIAGNOSIS — K21.9 GASTROESOPHAGEAL REFLUX DISEASE, ESOPHAGITIS PRESENCE NOT SPECIFIED: ICD-10-CM

## 2020-06-10 DIAGNOSIS — E16.2 HYPOGLYCEMIA: ICD-10-CM

## 2020-06-10 PROCEDURE — 99214 OFFICE O/P EST MOD 30 MIN: CPT | Performed by: NURSE PRACTITIONER

## 2020-06-10 RX ORDER — POTASSIUM CHLORIDE 750 MG/1
10 TABLET, FILM COATED, EXTENDED RELEASE ORAL DAILY
Qty: 30 TABLET | Refills: 5 | Status: SHIPPED | OUTPATIENT
Start: 2020-06-10 | End: 2021-01-04 | Stop reason: SDUPTHER

## 2020-06-10 RX ORDER — DEXLANSOPRAZOLE 60 MG/1
60 CAPSULE, DELAYED RELEASE ORAL DAILY
Qty: 30 CAPSULE | Refills: 5 | Status: SHIPPED | OUTPATIENT
Start: 2020-06-10 | End: 2021-01-04 | Stop reason: SDUPTHER

## 2020-06-10 RX ORDER — CETIRIZINE HYDROCHLORIDE 10 MG/1
TABLET ORAL
COMMUNITY
Start: 2020-05-22 | End: 2020-09-03

## 2020-06-10 RX ORDER — MONTELUKAST SODIUM 10 MG/1
10 TABLET ORAL NIGHTLY
Qty: 30 TABLET | Refills: 5 | Status: SHIPPED | OUTPATIENT
Start: 2020-06-10 | End: 2021-01-04 | Stop reason: SDUPTHER

## 2020-06-10 RX ORDER — ALBUTEROL SULFATE 2.5 MG/3ML
2.5 SOLUTION RESPIRATORY (INHALATION) EVERY 4 HOURS PRN
Qty: 180 VIAL | Refills: 5 | Status: SHIPPED | OUTPATIENT
Start: 2020-06-10 | End: 2021-01-04 | Stop reason: SDUPTHER

## 2020-06-10 RX ORDER — ORPHENADRINE CITRATE 100 MG/1
TABLET, EXTENDED RELEASE ORAL
COMMUNITY
Start: 2020-05-22 | End: 2020-06-10

## 2020-06-10 RX ORDER — HYDROCODONE BITARTRATE AND ACETAMINOPHEN 10; 325 MG/1; MG/1
1 TABLET ORAL 2 TIMES DAILY PRN
Qty: 60 TABLET | Refills: 0 | Status: SHIPPED | OUTPATIENT
Start: 2020-06-10 | End: 2020-07-09 | Stop reason: SDUPTHER

## 2020-06-10 RX ORDER — PHENYTOIN SODIUM 100 MG/1
200 CAPSULE, EXTENDED RELEASE ORAL 2 TIMES DAILY
Qty: 120 CAPSULE | Refills: 5 | Status: SHIPPED | OUTPATIENT
Start: 2020-06-10 | End: 2020-10-08 | Stop reason: SDUPTHER

## 2020-06-10 NOTE — PROGRESS NOTES
"Subjective   Jaquan Mckay is a 49 y.o. male.     Chief Complaint   Patient presents with   • Joint Pain       History of Present Illness     Bowels-reports he had a skin reaction to Movantik including facial rash and some itching.  He reports he had to take Benadryl.  He reports he decreased lactulose to 15 ml due to diarrhea but now has not had a BM in approx 4 days.  He has contacted GI and was prescribed Relistor.  He has not gotten due to needing a PA.   Potassium-reports he needs a refill on KDur.  He reports he has been out a few weeks.  He has tolerated well in the past without any difficulty.  Ongoing  Asthma-reports he has noted improvement in his breathing with use of Advair HFA.  He reports he is better able to tolerate walking.  He request to have a refill on Nebulized solution.    Falls-reports he has had 2 falls since his phone visit last month.  He reports once was out of a chair trying to reach a object above his head.    Back and joint pain-some increased tenderness of right elbow after a fall and hitting his elbow.  He continues to have LBP and chronic right knee pain.  He reports he does loose his balance at times due to his knee pain and \"gives out\".   He has seen multiple orthopedic care providers.  He does have an upcoming follow-up with Ortho regarding his knee and elbow concerns    The following portions of the patient's history were reviewed and updated as appropriate: CC, ROS, allergies, current medications, past family history, past medical history, past social history, past surgical history and problem list.      Review of Systems   Constitutional: Negative for appetite change, fatigue and unexpected weight change.   HENT: Negative for congestion, ear pain, nosebleeds, postnasal drip, rhinorrhea, sore throat, trouble swallowing and voice change.    Eyes: Negative for photophobia, pain and visual disturbance.   Respiratory: Negative for cough, chest tightness, shortness of breath and " "wheezing.    Cardiovascular: Negative for chest pain and palpitations.   Gastrointestinal: Positive for abdominal pain and constipation. Negative for blood in stool, diarrhea and nausea.   Endocrine: Negative for cold intolerance and polydipsia.   Genitourinary: Negative for difficulty urinating, flank pain, frequency and hematuria.   Musculoskeletal: Positive for arthralgias and back pain. Negative for gait problem, joint swelling and myalgias.   Skin: Negative for color change and rash.   Allergic/Immunologic: Negative.    Neurological: Negative for dizziness, syncope, numbness and headaches.   Hematological: Negative.    Psychiatric/Behavioral: Negative for dysphoric mood, sleep disturbance and suicidal ideas. The patient is not nervous/anxious.    All other systems reviewed and are negative.      Objective     /72 (BP Location: Right arm, Patient Position: Sitting)   Pulse 97   Temp 97.5 °F (36.4 °C) (Temporal)   Resp 16   Ht 170.2 cm (67\")   Wt 105 kg (232 lb)   SpO2 98%   BMI 36.34 kg/m²     Physical Exam   Constitutional: He is oriented to person, place, and time. He appears well-developed and well-nourished. No distress.   HENT:   Head: Normocephalic and atraumatic.   Right Ear: Hearing, tympanic membrane, external ear and ear canal normal.   Left Ear: Hearing, tympanic membrane, external ear and ear canal normal.   Oropharynx not examined.  Patient is presently wearing a face covering/mask due to COVID-19 pandemic.   Eyes: Pupils are equal, round, and reactive to light. Conjunctivae, EOM and lids are normal. No scleral icterus. Right eye exhibits normal extraocular motion and no nystagmus. Left eye exhibits normal extraocular motion and no nystagmus.   Neck: Normal range of motion. Neck supple. No JVD present. No tracheal tenderness present. Carotid bruit is not present. No thyromegaly present.   Cardiovascular: Normal rate, regular rhythm, S1 normal, S2 normal, normal heart sounds and intact " distal pulses.   No murmur heard.  Pulmonary/Chest: Effort normal and breath sounds normal. He exhibits no tenderness.   Abdominal: Soft. Bowel sounds are normal. He exhibits no mass. There is no hepatosplenomegaly. There is no tenderness.   Musculoskeletal: He exhibits no edema.        Left knee: He exhibits decreased range of motion. Tenderness found. Lateral joint line tenderness noted.        Lumbar back: He exhibits tenderness.        Right foot: There is decreased range of motion, tenderness and swelling. There is normal capillary refill.   Gait slow and cautious.  equal bilaterally. No muscular atrophy or flaccidity.   Lymphadenopathy:     He has no cervical adenopathy.        Right cervical: No superficial cervical adenopathy present.       Left cervical: No superficial cervical adenopathy present.   Neurological: He is alert and oriented to person, place, and time. He has normal strength and normal reflexes. He displays no tremor. No cranial nerve deficit or sensory deficit. He exhibits normal muscle tone. Coordination and gait normal.   Skin: Skin is warm and dry. Capillary refill takes less than 2 seconds. He is not diaphoretic. No cyanosis. No pallor. Nails show no clubbing.   Psychiatric: He has a normal mood and affect. His speech is normal and behavior is normal. Judgment and thought content normal. He is not actively hallucinating. Cognition and memory are normal. He is attentive.   Vitals reviewed.      Assessment/Plan     Problem List Items Addressed This Visit        Cardiovascular and Mediastinum    Hyperlipidemia    Relevant Orders    Lipid Panel       Respiratory    Mild persistent asthma without complication - Primary    Relevant Medications    fluticasone-salmeterol (Advair HFA) 115-21 MCG/ACT inhaler    montelukast (SINGULAIR) 10 MG tablet    albuterol (PROVENTIL) (2.5 MG/3ML) 0.083% nebulizer solution       Digestive    Class 1 obesity due to excess calories with serious comorbidity  and body mass index (BMI) of 33.0 to 33.9 in adult    Relevant Orders    CBC & Differential    Comprehensive Metabolic Panel    TSH    Hemoglobin A1c    Vitamin D 25 Hydroxy    T4, Free    Vitamin B12       Nervous and Auditory    Seizure disorder (CMS/HCC)    Relevant Medications    DILANTIN 100 MG ER capsule    Other Relevant Orders    Phenytoin level, free    Phenytoin level, total    Chronic bilateral low back pain with left-sided sciatica    Relevant Medications    HYDROcodone-acetaminophen (NORCO)  MG per tablet    Other Relevant Orders    Urine Drug Screen - Urine, Clean Catch    Chest pain       Musculoskeletal and Integument    Chondromalacia, knee    Relevant Medications    HYDROcodone-acetaminophen (NORCO)  MG per tablet      Other Visit Diagnoses     Gastroesophageal reflux disease, esophagitis presence not specified        Relevant Medications    dexlansoprazole (Dexilant) 60 MG capsule    Mild intermittent asthma without complication        Relevant Medications    fluticasone-salmeterol (Advair HFA) 115-21 MCG/ACT inhaler    montelukast (SINGULAIR) 10 MG tablet    albuterol (PROVENTIL) (2.5 MG/3ML) 0.083% nebulizer solution    Moderate persistent asthma without complication        Relevant Medications    fluticasone-salmeterol (Advair HFA) 115-21 MCG/ACT inhaler    montelukast (SINGULAIR) 10 MG tablet    albuterol (PROVENTIL) (2.5 MG/3ML) 0.083% nebulizer solution    Hypoglycemia        Relevant Orders    Hemoglobin A1c          Patient's Body mass index is 36.34 kg/m². BMI is above normal parameters. Recommendations include: nutrition counseling.       Understands disease processes and need for medications.  Understands reasons for urgent and emergent care.  Patient (& family) verbalized agreement for treatment plan.   Emotional support and active listening provided.  Patient provided time to verbalize feelings.    CHAVEZ #[id number] reviewed today and consistent.  Will refill prescribed  controlled medication today.  Patient is aware they cannot receive narcotics from any other provider except if under care of pain management or speciality clinic.  Risk and benefits of medication use has been reviewed.  History and physical exam exhibit continued safe and appropriate use of controlled substances.  The patient is aware of the potential for addiction and dependence.  This patient has been made aware of the appropriate use of such medications, including potential risk of somnolence, limited ability to drive and / or work safely, and potential for overdose.    It has also been made clear that these medications are for use by this patient only, without concomitant use of alcohol or other substances unless prescribed/advised by medical provider.  Patient understands they may be subject to UDS and pill counts at random.      Patient considered to be low risk for addiction due to use of single controlled medications.  Patient understands and accepts these risks.  Patient need for medication will be reassessed at each visit.  Doses will be adjusted according to patient need and findings.    Goal of TX: Patient will not have any adverse reactions of medication.  Patient have reduction in pain symptoms with use of PRN Norco as directed.  Patient will not have any adverse side effects from medication.  Patient will be able to remain active in an outside of home without interference from pain symptoms.    Refill on routine maintenance meds today.     Labs ordered.  Patient prefers to do at [facility] [place]    RTC 1 month, sooner if needed.           This document has been electronically signed by:  BALDOMERO Thao, FNP-C    Dragon disclaimer:  Much of this encounter note is an electronic transcription/translation of spoken language to printed text. The electronic translation of spoken language may permit erroneous, or at times, nonsensical words or phrases to be inadvertently transcribed; Although I have reviewed the  note for such errors, some may still exist.

## 2020-06-24 ENCOUNTER — LAB (OUTPATIENT)
Dept: LAB | Facility: HOSPITAL | Age: 50
End: 2020-06-24

## 2020-06-24 PROCEDURE — 80307 DRUG TEST PRSMV CHEM ANLYZR: CPT | Performed by: NURSE PRACTITIONER

## 2020-06-24 PROCEDURE — 85025 COMPLETE CBC W/AUTO DIFF WBC: CPT | Performed by: NURSE PRACTITIONER

## 2020-06-24 PROCEDURE — 82607 VITAMIN B-12: CPT | Performed by: NURSE PRACTITIONER

## 2020-06-24 PROCEDURE — 82306 VITAMIN D 25 HYDROXY: CPT | Performed by: NURSE PRACTITIONER

## 2020-06-24 PROCEDURE — 83036 HEMOGLOBIN GLYCOSYLATED A1C: CPT | Performed by: NURSE PRACTITIONER

## 2020-06-24 PROCEDURE — 80053 COMPREHEN METABOLIC PANEL: CPT | Performed by: NURSE PRACTITIONER

## 2020-06-24 PROCEDURE — 80061 LIPID PANEL: CPT | Performed by: NURSE PRACTITIONER

## 2020-06-24 PROCEDURE — 84443 ASSAY THYROID STIM HORMONE: CPT | Performed by: NURSE PRACTITIONER

## 2020-06-24 PROCEDURE — 80185 ASSAY OF PHENYTOIN TOTAL: CPT | Performed by: NURSE PRACTITIONER

## 2020-06-24 PROCEDURE — 36415 COLL VENOUS BLD VENIPUNCTURE: CPT | Performed by: NURSE PRACTITIONER

## 2020-06-24 PROCEDURE — 80186 ASSAY OF PHENYTOIN FREE: CPT | Performed by: NURSE PRACTITIONER

## 2020-06-24 PROCEDURE — 84439 ASSAY OF FREE THYROXINE: CPT | Performed by: NURSE PRACTITIONER

## 2020-06-25 LAB
25(OH)D3 SERPL-MCNC: 47.1 NG/ML (ref 30–100)
ALBUMIN SERPL-MCNC: 4.3 G/DL (ref 3.5–5.2)
ALBUMIN/GLOB SERPL: 1.3 G/DL
ALP SERPL-CCNC: 71 U/L (ref 39–117)
ALT SERPL W P-5'-P-CCNC: 22 U/L (ref 1–41)
AMPHET+METHAMPHET UR QL: NEGATIVE
ANION GAP SERPL CALCULATED.3IONS-SCNC: 10.3 MMOL/L (ref 5–15)
AST SERPL-CCNC: 23 U/L (ref 1–40)
BARBITURATES UR QL SCN: NEGATIVE
BASOPHILS # BLD AUTO: 0.02 10*3/MM3 (ref 0–0.2)
BASOPHILS NFR BLD AUTO: 0.3 % (ref 0–1.5)
BENZODIAZ UR QL SCN: NEGATIVE
BILIRUB SERPL-MCNC: 0.3 MG/DL (ref 0.2–1.2)
BUN BLD-MCNC: 11 MG/DL (ref 6–20)
BUN/CREAT SERPL: 12 (ref 7–25)
CALCIUM SPEC-SCNC: 9 MG/DL (ref 8.6–10.5)
CANNABINOIDS SERPL QL: NEGATIVE
CHLORIDE SERPL-SCNC: 102 MMOL/L (ref 98–107)
CHOLEST SERPL-MCNC: 206 MG/DL (ref 0–200)
CO2 SERPL-SCNC: 23.7 MMOL/L (ref 22–29)
COCAINE UR QL: NEGATIVE
CREAT BLD-MCNC: 0.92 MG/DL (ref 0.76–1.27)
DEPRECATED RDW RBC AUTO: 44 FL (ref 37–54)
EOSINOPHIL # BLD AUTO: 0.12 10*3/MM3 (ref 0–0.4)
EOSINOPHIL NFR BLD AUTO: 2 % (ref 0.3–6.2)
ERYTHROCYTE [DISTWIDTH] IN BLOOD BY AUTOMATED COUNT: 12.7 % (ref 12.3–15.4)
GFR SERPL CREATININE-BSD FRML MDRD: 88 ML/MIN/1.73
GLOBULIN UR ELPH-MCNC: 3.2 GM/DL
GLUCOSE BLD-MCNC: 81 MG/DL (ref 65–99)
HBA1C MFR BLD: 4.9 % (ref 4.8–5.6)
HCT VFR BLD AUTO: 43 % (ref 37.5–51)
HDLC SERPL-MCNC: 64 MG/DL (ref 40–60)
HGB BLD-MCNC: 15.1 G/DL (ref 13–17.7)
IMM GRANULOCYTES # BLD AUTO: 0.01 10*3/MM3 (ref 0–0.05)
IMM GRANULOCYTES NFR BLD AUTO: 0.2 % (ref 0–0.5)
LDLC SERPL CALC-MCNC: 110 MG/DL (ref 0–100)
LDLC/HDLC SERPL: 1.71 {RATIO}
LYMPHOCYTES # BLD AUTO: 1.82 10*3/MM3 (ref 0.7–3.1)
LYMPHOCYTES NFR BLD AUTO: 30.8 % (ref 19.6–45.3)
MCH RBC QN AUTO: 32.9 PG (ref 26.6–33)
MCHC RBC AUTO-ENTMCNC: 35.1 G/DL (ref 31.5–35.7)
MCV RBC AUTO: 93.7 FL (ref 79–97)
METHADONE UR QL SCN: NEGATIVE
MONOCYTES # BLD AUTO: 0.73 10*3/MM3 (ref 0.1–0.9)
MONOCYTES NFR BLD AUTO: 12.4 % (ref 5–12)
NEUTROPHILS # BLD AUTO: 3.2 10*3/MM3 (ref 1.7–7)
NEUTROPHILS NFR BLD AUTO: 54.3 % (ref 42.7–76)
NRBC BLD AUTO-RTO: 0 /100 WBC (ref 0–0.2)
OPIATES UR QL: NEGATIVE
OXYCODONE UR QL SCN: NEGATIVE
PHENYTOIN SERPL-MCNC: 11.2 MCG/ML (ref 10–20)
PLATELET # BLD AUTO: 202 10*3/MM3 (ref 140–450)
PMV BLD AUTO: 12.7 FL (ref 6–12)
POTASSIUM BLD-SCNC: 3.9 MMOL/L (ref 3.5–5.2)
PROT SERPL-MCNC: 7.5 G/DL (ref 6–8.5)
RBC # BLD AUTO: 4.59 10*6/MM3 (ref 4.14–5.8)
SODIUM BLD-SCNC: 136 MMOL/L (ref 136–145)
T4 FREE SERPL-MCNC: 0.96 NG/DL (ref 0.93–1.7)
TRIGL SERPL-MCNC: 162 MG/DL (ref 0–150)
TSH SERPL DL<=0.05 MIU/L-ACNC: 1.32 UIU/ML (ref 0.27–4.2)
VIT B12 BLD-MCNC: 505 PG/ML (ref 211–946)
VLDLC SERPL-MCNC: 32.4 MG/DL (ref 5–40)
WBC NRBC COR # BLD: 5.9 10*3/MM3 (ref 3.4–10.8)

## 2020-06-25 NOTE — PROGRESS NOTES
His vitamin B12 and vitamin D are normal.  Thyroid is normal.  Blood sugar was 81 when he had his labs done.  Kidney and liver function is normal.  Cholesterol is still elevated but improved from his last labs.  His Dilantin level was normal.  No low blood count is noted

## 2020-06-26 LAB — PHENYTOIN FREE SERPL-MCNC: 0.7 UG/ML (ref 1–2)

## 2020-07-06 ENCOUNTER — OFFICE VISIT (OUTPATIENT)
Dept: ORTHOPEDIC SURGERY | Facility: CLINIC | Age: 50
End: 2020-07-06

## 2020-07-06 VITALS
SYSTOLIC BLOOD PRESSURE: 128 MMHG | HEIGHT: 67 IN | DIASTOLIC BLOOD PRESSURE: 70 MMHG | WEIGHT: 222 LBS | HEART RATE: 81 BPM | BODY MASS INDEX: 34.84 KG/M2 | TEMPERATURE: 98 F

## 2020-07-06 DIAGNOSIS — M77.11 LATERAL EPICONDYLITIS, RIGHT ELBOW: Primary | ICD-10-CM

## 2020-07-06 PROCEDURE — 99214 OFFICE O/P EST MOD 30 MIN: CPT | Performed by: ORTHOPAEDIC SURGERY

## 2020-07-06 NOTE — H&P (VIEW-ONLY)
History and Physical      Patient: Jaquan Mckay  YOB: 1970  Date of Encounter: 07/06/2020      Chief Complaint:   Chief Complaint   Patient presents with   • Right Elbow - Pain, Edema, Follow-up           HPI:   Jaquan Mckay, 49 y.o. male, presents in follow-up with right elbow complaints.  He was provided steroid injection for lateral epicondylitis right elbow November 20 of 2019.  He reports he received very little benefit.  He complains of pain with any sort of activity he states he is unable to  a gallon of milk.  Denies weakness or numbness in his right arm.  His medical history is remarkable for fracture of his right elbow at age 7.  He also acknowledges occasional tingling in his third and fourth fingers.  He has now been symptomatic with pain in his right elbow for 2 years.  He has known cardiac history.        Active Problem List:  Patient Active Problem List   Diagnosis   • Gastroesophageal reflux disease without esophagitis   • Arthritis   • Hypertension   • Hyperlipidemia   • Anxiety   • Varicocele   • Varicocele present on ultrasound of scrotum   • Obesity (BMI 30.0-34.9)   • Chronic bilateral low back pain with left-sided sciatica   • Seasonal allergic rhinitis due to pollen   • Mild persistent asthma without complication   • Precordial pain   • Dysuria   • Congenital coronary artery anomaly   • BMI 35.0-35.9,adult   • Chondromalacia, knee   • Achilles tendinitis of both lower extremities   • Muscle spasm of both lower legs   • Personal history of allergy to shellfish   • Sensation of cold in lower extremity   • Varicose vein of leg   • Heart palpitations   • Shortness of breath   • Generalized anxiety disorder   • Chronic elbow pain, right   • Chest pain   • Unstable angina (CMS/HCC)   • ASCVD (arteriosclerotic cardiovascular disease)   • Elevated prolactin level   • Other constipation   • Class 1 obesity due to excess calories with serious comorbidity and body mass index  "(BMI) of 33.0 to 33.9 in adult   • Bacteremia   • Therapeutic opioid-induced constipation (OIC)   • Rectal bleeding   • Bloating   • Generalized abdominal pain   • History of colon polyps           Past Medical History:  Past Medical History:   Diagnosis Date   • Allergic    • Anxiety    • Arthritis    • Asthma    • Body piercing     REPORTS CYLICONE IN EARS   • Clotting disorder (CMS/HCC) 2004    had a knee surgery   • Depression    • DVT (deep venous thrombosis) (CMS/[facility])     RIGHT RIGHT KNEE AFTER SURGERY YEARS AGO IN 2001 OR 2004   • Gastric ulcer    • GERD (gastroesophageal reflux disease)    • H/O migraine    • H/O sleep apnea     REPORTS DIAGNOSED WITH SLEEP APNEA AND COULDN'T STAND TO WEAR THE MACHINE   • Headache    • Heart attack (CMS/[facility])     REPORTS \"LIGHT HEART ATTACK A LONG TIME AGO\"  \"EARLY 90'S\"   • History of seizures     REPORTS LAST EPISODE WAS AROUND 1995.   • Hostility    • Hyperlipidemia    • Hypertension    • Knee pain, acute     Left   • Low back pain    • Migraine    • MRSA (methicillin resistant Staphylococcus aureus)     REPORTS LAST TESTED + 2004. WAS TREATED HE REPORTS.  RIGHT ARM, RIGHT KNEE.   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    • Obesity    • Poor historian    • [place] spotted fever    • Seizures (CMS/HCC)    • Tattoo    • Wears glasses            Past Surgical History:  Past Surgical History:   Procedure Laterality Date   • BACK SURGERY     • BRAIN SURGERY  1986    Tumor removal    • CARDIAC CATHETERIZATION N/A 9/28/2018    Procedure: Left Heart Cath;  Surgeon: Leandro Daily MD;  Location: [facility] INVASIVE LOCATION;  Service: Cardiology   • CHOLECYSTECTOMY     • CYST REMOVAL     • KNEE ARTHROSCOPY Left 10/20/2017    Procedure: Diagnostic arthroscopy left knee with chondroplasty;  Surgeon: Marco Aguirre MD;  Location: [facility] OR;  Service:    • KNEE SURGERY Right    • MOUTH SURGERY      FULL MOUTH EXTRACTION   • OTHER SURGICAL HISTORY      REPORTS 7 TICKS REMOVED FROM RIGHT ARM IN " 2001 OR 2002   • TUMOR EXCISION      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           Family History:  Family History   Problem Relation Age of Onset   • Diabetes Mother    • Hypertension Mother    • Stroke Mother    • Diabetes Father    • Skin cancer Father    • Hypertension Father    • Heart attack Father    • Diabetes Brother    • Hypertension Brother    • Heart disease Maternal Aunt    • Heart disease Maternal Uncle    • Heart disease Paternal Aunt    • Heart disease Paternal Uncle    • Heart disease Maternal Grandmother    • Heart disease Maternal Grandfather    • Heart disease Paternal Grandmother    • Heart disease Paternal Grandfather            Social History:  Social History     Socioeconomic History   • Marital status:      Spouse name: Becca   • Number of children: 2   • Years of education: 12   • Highest education level: Not on file   Occupational History   • Occupation: DISABLED   Social Needs   • Financial resource strain: Somewhat hard   • Food insecurity:     Worry: Sometimes true     Inability: Sometimes true   • Transportation needs:     Medical: No     Non-medical: No   Tobacco Use   • Smoking status: Current Every Day Smoker     Packs/day: 1.50     Years: 17.00     Pack years: 25.50     Types: Cigars, Cigarettes     Start date: 5/5/2010   • Smokeless tobacco: Never Used   • Tobacco comment: still uses 1.5 packs a day.   Substance and Sexual Activity   • Alcohol use: No   • Drug use: No   • Sexual activity: Defer   Lifestyle   • Physical activity:     Days per week: 0 days     Minutes per session: 0 min   • Stress: To some extent   Relationships   • Social connections:     Talks on phone: Once a week     Gets together: Once a week     Attends Anabaptist service: Never     Active member of club or organization: No     Attends meetings of clubs or organizations: Never     Relationship status:      Body mass index is 34.77 kg/m².        Medications:  Current Outpatient  Medications   Medication Sig Dispense Refill   • albuterol (PROVENTIL) (2.5 MG/3ML) 0.083% nebulizer solution Take 2.5 mg by nebulization Every 4 (Four) Hours As Needed for Shortness of Air. 180 vial 5   • albuterol sulfate  (90 Base) MCG/ACT inhaler Inhale 2 puffs Every 4 (Four) Hours As Needed for Wheezing.     • ASPIRIN ADULT LOW STRENGTH 81 MG EC tablet TAKE 1 TABLET BY MOUTH DAILY FOR HEART HEALTH AND CIRCULATION 30 tablet 3   • Blood Pressure Monitoring (HEALTH SENSE BP MONITOR) device 1 each Daily. 1 each 0   • busPIRone (BUSPAR) 15 MG tablet Take 1 tablet by mouth 3 (Three) Times a Day. 90 tablet 2   • cetirizine (zyrTEC) 10 MG tablet      • cyclobenzaprine (FLEXERIL) 10 MG tablet Take 1 tablet by mouth 3 (Three) Times a Day As Needed for Muscle Spasms. 30 tablet 0   • dexlansoprazole (Dexilant) 60 MG capsule Take 1 capsule by mouth Daily. 30 capsule 5   • DILANTIN 100 MG ER capsule Take 2 capsules by mouth 2 (Two) Times a Day. 120 capsule 5   • diphenhydrAMINE (BENADRYL ALLERGY) 25 MG tablet Take 1 tablet by mouth Every 6 (Six) Hours As Needed for Itching or Allergies. 30 tablet 1   • famotidine (PEPCID) 40 MG tablet 40 mg At Night As Needed for Heartburn.     • fluticasone-salmeterol (Advair HFA) 115-21 MCG/ACT inhaler Inhale 2 puffs 2 (Two) Times a Day. 1 inhaler 11   • ibuprofen (ADVIL,MOTRIN) 800 MG tablet      • lactulose (CHRONULAC) 10 GM/15ML solution Take 30 mL by mouth 2 (Two) Times a Day. 1892 mL 0   • Methylnaltrexone Bromide (Relistor) 150 MG tablet Take 450 mg by mouth Every Morning. 90 tablet 5   • mirtazapine (REMERON) 30 MG tablet Take 0.5-1 tablets by mouth Every Night. 1 tablet 2-3 hours before bedtime 60 tablet 5   • Misc. Devices (BATH/SHOWER SEAT) misc 1 each Daily. 1 each 0   • montelukast (SINGULAIR) 10 MG tablet Take 1 tablet by mouth Every Night. 30 tablet 5   • OLANZapine-FLUoxetine (SYMBYAX) 12-25 MG per capsule Take 1 capsule by mouth Every Evening.     • polyethylene  "glycol (GoLYTELY) 236 g solution Starting at 6pm the day before procedure, drink 8 ounces every 30 minutes until all gone or stools are clear. May add flavor packet. 4000 mL 0   • potassium chloride (K-DUR) 10 MEQ CR tablet Take 1 tablet by mouth Daily. 30 tablet 5   • atorvastatin (LIPITOR) 40 MG tablet Take 1 tablet by mouth Daily. 90 tablet 3   • azithromycin (Zithromax) 250 MG tablet Take 2 tablets the first day, then 1 tablet daily for 4 days. 6 tablet 0   • HYDROcodone-acetaminophen (NORCO)  MG per tablet Take 1 tablet by mouth 2 (Two) Times a Day As Needed for Moderate Pain . 60 tablet 0   • lisinopril (PRINIVIL,ZESTRIL) 20 MG tablet Take 2 tablets by mouth Daily. FOR BLOOD PRESSURE 60 tablet 5   • loratadine (CLARITIN) 10 MG tablet Take 1 tablet by mouth Daily As Needed for Allergies. 30 tablet 5   • mupirocin (BACTROBAN) 2 % ointment Apply  topically to the appropriate area as directed 3 (Three) Times a Day. 30 g 0   • vitamin D (ERGOCALCIFEROL) 1.25 MG (79731 UT) capsule capsule Take 1 capsule by mouth Every 7 (Seven) Days. On Friday 5 capsule 5     No current facility-administered medications for this visit.            Allergies:  Allergies   Allergen Reactions   • Ciprofloxacin Anaphylaxis and Hives   • Mobic [Meloxicam] Other (See Comments)     Pt states, \"It make my feet and hands go numb and I can't hardly walk.\"    • Paxil [Paroxetine Hcl] Shortness Of Breath     Chest pain    • Peanut-Containing Drug Products Anaphylaxis   • Penicillins Anaphylaxis   • Pristiq [Desvenlafaxine Succinate Er] Dizziness   • Sulfa Antibiotics Anaphylaxis, Itching and Rash   • Doxycycline Hives   • Fish-Derived Products Hives   • Isosorbide Nitrate Rash     Rash, hives, had to use inhaler.    • Movantik [Naloxegol] Rash   • Clarithromycin Rash   • Clindamycin/Lincomycin Rash   • Contrast Dye Itching and Rash   • Diltiazem Rash   • Gabapentin Rash   • Keflex [Cephalexin] Rash   • Metoprolol Rash   • Prednisone Rash " and Other (See Comments)     Face, feet, and legs go completely numb per patient   • Robitussin Cough+ Chest Max St [Dextromethorphan-Guaifenesin] Itching   • Shrimp (Diagnostic) Rash   • Spironolactone Rash   • Viibryd [Vilazodone Hcl] Itching and Rash   • Zoloft [Sertraline Hcl] Hives and Itching           Review of Systems:   Review of Systems   Constitutional: Positive for chills.   Eyes: Positive for pain and itching.   Respiratory: Positive for cough, chest tightness, shortness of breath and wheezing.    Cardiovascular: Positive for leg swelling.   Gastrointestinal: Negative.    Endocrine: Positive for cold intolerance.   Genitourinary: Negative.    Musculoskeletal: Positive for back pain, joint swelling and neck pain.   Skin: Negative.    Allergic/Immunologic: Positive for food allergies.   Neurological: Positive for dizziness, seizures, weakness, light-headedness, numbness and headaches.   Hematological: Bruises/bleeds easily.        Blood Clots   Psychiatric/Behavioral: Positive for sleep disturbance. The patient is nervous/anxious.            Physical Exam:   Physical Exam   Constitutional: He is oriented to person, place, and time. No distress.   HENT:   Head: Normocephalic and atraumatic.   Right Ear: External ear normal.   Left Ear: External ear normal.   Eyes: Conjunctivae and EOM are normal. Right eye exhibits no discharge. Left eye exhibits no discharge.   Neck: Normal range of motion. Neck supple.   Cardiovascular: Normal rate, regular rhythm and normal heart sounds.   No murmur heard.  Pulmonary/Chest: Effort normal and breath sounds normal. No respiratory distress. He has no wheezes.   Abdominal: Soft. He exhibits no distension. There is no guarding.   Neurological: He is alert and oriented to person, place, and time.   Skin: Skin is warm and dry. Capillary refill takes less than 2 seconds. He is not diaphoretic.   Psychiatric: He has a normal mood and affect. His behavior is normal. Judgment and  "thought content normal.   Nursing note and vitals reviewed.    GENERAL: 49 y.o. male, alert and oriented X 3 in no acute distress.   Visit Vitals  /70   Pulse 81   Temp 98 °F (36.7 °C)   Ht 170.2 cm (67\")   Wt 101 kg (222 lb)   BMI 34.77 kg/m²         Musculoskeletal:   Examination right elbow evaluation reveals mild swelling over the posterior lateral aspect superficial to the lateral epicondyle with exquisite tenderness to palpation along the lateral epicondyle and conjoined tendon and marked discomfort with resisted wrist extension he demonstrates normal neurovascular status has full mobility with lection extension pronation supination and no instability.      Assessment & Plan:   49 y.o. male presents with findings consistent with lateral epicondylitis right elbow now of 2 years duration fairly severe the past year he has failed steroid injection and refuses to consider any further injections.  He is requesting definitive treatment of his elbow pain.  We discussed physical therapy he has declined.  We discussed these lateral epicondyles surgically.  After long discussion he wishes to proceed he understands that he may not receive full benefit he also understands that he will need to be compliant afterwards and restricting his activity he is fully prepared for this.  Surgery is therefore scheduled pending cardiac clearance.      ICD-10-CM ICD-9-CM   1. Lateral epicondylitis, right elbow M77.11 726.32           Cc:   Tiera Valenzuela APRN              This document has been electronically signed by Jose Ruiz MD   July 9, 2020 19:12                "

## 2020-07-06 NOTE — PROGRESS NOTES
History and Physical      Patient: Jaquan Mckay  YOB: 1970  Date of Encounter: 07/06/2020      Chief Complaint:   Chief Complaint   Patient presents with   • Right Elbow - Pain, Edema, Follow-up           HPI:   Jaquan Mckay, 49 y.o. male, presents in follow-up with right elbow complaints.  He was provided steroid injection for lateral epicondylitis right elbow November 20 of 2019.  He reports he received very little benefit.  He complains of pain with any sort of activity he states he is unable to  a gallon of milk.  Denies weakness or numbness in his right arm.  His medical history is remarkable for fracture of his right elbow at age 7.  He also acknowledges occasional tingling in his third and fourth fingers.  He has now been symptomatic with pain in his right elbow for 2 years.  He has known cardiac history.        Active Problem List:  Patient Active Problem List   Diagnosis   • Gastroesophageal reflux disease without esophagitis   • Arthritis   • Hypertension   • Hyperlipidemia   • Anxiety   • Varicocele   • Varicocele present on ultrasound of scrotum   • Obesity (BMI 30.0-34.9)   • Chronic bilateral low back pain with left-sided sciatica   • Seasonal allergic rhinitis due to pollen   • Mild persistent asthma without complication   • Precordial pain   • Dysuria   • Congenital coronary artery anomaly   • BMI 35.0-35.9,adult   • Chondromalacia, knee   • Achilles tendinitis of both lower extremities   • Muscle spasm of both lower legs   • Personal history of allergy to shellfish   • Sensation of cold in lower extremity   • Varicose vein of leg   • Heart palpitations   • Shortness of breath   • Generalized anxiety disorder   • Chronic elbow pain, right   • Chest pain   • Unstable angina (CMS/HCC)   • ASCVD (arteriosclerotic cardiovascular disease)   • Elevated prolactin level   • Other constipation   • Class 1 obesity due to excess calories with serious comorbidity and body mass index  "(BMI) of 33.0 to 33.9 in adult   • Bacteremia   • Therapeutic opioid-induced constipation (OIC)   • Rectal bleeding   • Bloating   • Generalized abdominal pain   • History of colon polyps           Past Medical History:  Past Medical History:   Diagnosis Date   • Allergic    • Anxiety    • Arthritis    • Asthma    • Body piercing     REPORTS CYLICONE IN EARS   • Clotting disorder (CMS/HCC) 2004    had a knee surgery   • Depression    • DVT (deep venous thrombosis) (CMS/[facility])     RIGHT RIGHT KNEE AFTER SURGERY YEARS AGO IN 2001 OR 2004   • Gastric ulcer    • GERD (gastroesophageal reflux disease)    • H/O migraine    • H/O sleep apnea     REPORTS DIAGNOSED WITH SLEEP APNEA AND COULDN'T STAND TO WEAR THE MACHINE   • Headache    • Heart attack (CMS/[facility])     REPORTS \"LIGHT HEART ATTACK A LONG TIME AGO\"  \"EARLY 90'S\"   • History of seizures     REPORTS LAST EPISODE WAS AROUND 1995.   • Hostility    • Hyperlipidemia    • Hypertension    • Knee pain, acute     Left   • Low back pain    • Migraine    • MRSA (methicillin resistant Staphylococcus aureus)     REPORTS LAST TESTED + 2004. WAS TREATED HE REPORTS.  RIGHT ARM, RIGHT KNEE.   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    • Obesity    • Poor historian    • [place] spotted fever    • Seizures (CMS/HCC)    • Tattoo    • Wears glasses            Past Surgical History:  Past Surgical History:   Procedure Laterality Date   • BACK SURGERY     • BRAIN SURGERY  1986    Tumor removal    • CARDIAC CATHETERIZATION N/A 9/28/2018    Procedure: Left Heart Cath;  Surgeon: Leandro Daily MD;  Location: [facility] INVASIVE LOCATION;  Service: Cardiology   • CHOLECYSTECTOMY     • CYST REMOVAL     • KNEE ARTHROSCOPY Left 10/20/2017    Procedure: Diagnostic arthroscopy left knee with chondroplasty;  Surgeon: Marco Aguirre MD;  Location: [facility] OR;  Service:    • KNEE SURGERY Right    • MOUTH SURGERY      FULL MOUTH EXTRACTION   • OTHER SURGICAL HISTORY      REPORTS 7 TICKS REMOVED FROM RIGHT ARM IN " 2001 OR 2002   • TUMOR EXCISION      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           Family History:  Family History   Problem Relation Age of Onset   • Diabetes Mother    • Hypertension Mother    • Stroke Mother    • Diabetes Father    • Skin cancer Father    • Hypertension Father    • Heart attack Father    • Diabetes Brother    • Hypertension Brother    • Heart disease Maternal Aunt    • Heart disease Maternal Uncle    • Heart disease Paternal Aunt    • Heart disease Paternal Uncle    • Heart disease Maternal Grandmother    • Heart disease Maternal Grandfather    • Heart disease Paternal Grandmother    • Heart disease Paternal Grandfather            Social History:  Social History     Socioeconomic History   • Marital status:      Spouse name: Becca   • Number of children: 2   • Years of education: 12   • Highest education level: Not on file   Occupational History   • Occupation: DISABLED   Social Needs   • Financial resource strain: Somewhat hard   • Food insecurity:     Worry: Sometimes true     Inability: Sometimes true   • Transportation needs:     Medical: No     Non-medical: No   Tobacco Use   • Smoking status: Current Every Day Smoker     Packs/day: 1.50     Years: 17.00     Pack years: 25.50     Types: Cigars, Cigarettes     Start date: 5/5/2010   • Smokeless tobacco: Never Used   • Tobacco comment: still uses 1.5 packs a day.   Substance and Sexual Activity   • Alcohol use: No   • Drug use: No   • Sexual activity: Defer   Lifestyle   • Physical activity:     Days per week: 0 days     Minutes per session: 0 min   • Stress: To some extent   Relationships   • Social connections:     Talks on phone: Once a week     Gets together: Once a week     Attends [employer] service: Never     Active member of club or organization: No     Attends meetings of clubs or organizations: Never     Relationship status:      Body mass index is 34.77 kg/m².        Medications:  Current Outpatient  Medications   Medication Sig Dispense Refill   • albuterol (PROVENTIL) (2.5 MG/3ML) 0.083% nebulizer solution Take 2.5 mg by nebulization Every 4 (Four) Hours As Needed for Shortness of Air. 180 vial 5   • albuterol sulfate  (90 Base) MCG/ACT inhaler Inhale 2 puffs Every 4 (Four) Hours As Needed for Wheezing.     • ASPIRIN ADULT LOW STRENGTH 81 MG EC tablet TAKE 1 TABLET BY MOUTH DAILY FOR HEART HEALTH AND CIRCULATION 30 tablet 3   • Blood Pressure Monitoring (HEALTH SENSE BP MONITOR) device 1 each Daily. 1 each 0   • busPIRone (BUSPAR) 15 MG tablet Take 1 tablet by mouth 3 (Three) Times a Day. 90 tablet 2   • cetirizine (zyrTEC) 10 MG tablet      • cyclobenzaprine (FLEXERIL) 10 MG tablet Take 1 tablet by mouth 3 (Three) Times a Day As Needed for Muscle Spasms. 30 tablet 0   • dexlansoprazole (Dexilant) 60 MG capsule Take 1 capsule by mouth Daily. 30 capsule 5   • DILANTIN 100 MG ER capsule Take 2 capsules by mouth 2 (Two) Times a Day. 120 capsule 5   • diphenhydrAMINE (BENADRYL ALLERGY) 25 MG tablet Take 1 tablet by mouth Every 6 (Six) Hours As Needed for Itching or Allergies. 30 tablet 1   • famotidine (PEPCID) 40 MG tablet 40 mg At Night As Needed for Heartburn.     • fluticasone-salmeterol (Advair HFA) 115-21 MCG/ACT inhaler Inhale 2 puffs 2 (Two) Times a Day. 1 inhaler 11   • ibuprofen (ADVIL,MOTRIN) 800 MG tablet      • lactulose (CHRONULAC) 10 GM/15ML solution Take 30 mL by mouth 2 (Two) Times a Day. 1892 mL 0   • Methylnaltrexone Bromide (Relistor) 150 MG tablet Take 450 mg by mouth Every Morning. 90 tablet 5   • mirtazapine (REMERON) 30 MG tablet Take 0.5-1 tablets by mouth Every Night. 1 tablet 2-3 hours before bedtime 60 tablet 5   • Misc. Devices (BATH/SHOWER SEAT) misc 1 each Daily. 1 each 0   • montelukast (SINGULAIR) 10 MG tablet Take 1 tablet by mouth Every Night. 30 tablet 5   • OLANZapine-FLUoxetine (SYMBYAX) 12-25 MG per capsule Take 1 capsule by mouth Every Evening.     • polyethylene  "glycol (GoLYTELY) 236 g solution Starting at 6pm the day before procedure, drink 8 ounces every 30 minutes until all gone or stools are clear. May add flavor packet. 4000 mL 0   • potassium chloride (K-DUR) 10 MEQ CR tablet Take 1 tablet by mouth Daily. 30 tablet 5   • atorvastatin (LIPITOR) 40 MG tablet Take 1 tablet by mouth Daily. 90 tablet 3   • azithromycin (Zithromax) 250 MG tablet Take 2 tablets the first day, then 1 tablet daily for 4 days. 6 tablet 0   • HYDROcodone-acetaminophen (NORCO)  MG per tablet Take 1 tablet by mouth 2 (Two) Times a Day As Needed for Moderate Pain . 60 tablet 0   • lisinopril (PRINIVIL,ZESTRIL) 20 MG tablet Take 2 tablets by mouth Daily. FOR BLOOD PRESSURE 60 tablet 5   • loratadine (CLARITIN) 10 MG tablet Take 1 tablet by mouth Daily As Needed for Allergies. 30 tablet 5   • mupirocin (BACTROBAN) 2 % ointment Apply  topically to the appropriate area as directed 3 (Three) Times a Day. 30 g 0   • vitamin D (ERGOCALCIFEROL) 1.25 MG (29249 UT) capsule capsule Take 1 capsule by mouth Every 7 (Seven) Days. On Friday 5 capsule 5     No current facility-administered medications for this visit.            Allergies:  Allergies   Allergen Reactions   • Ciprofloxacin Anaphylaxis and Hives   • Mobic [Meloxicam] Other (See Comments)     Pt states, \"It make my feet and hands go numb and I can't hardly walk.\"    • Paxil [Paroxetine Hcl] Shortness Of Breath     Chest pain    • Peanut-Containing Drug Products Anaphylaxis   • Penicillins Anaphylaxis   • Pristiq [Desvenlafaxine Succinate Er] Dizziness   • Sulfa Antibiotics Anaphylaxis, Itching and Rash   • Doxycycline Hives   • Fish-Derived Products Hives   • Isosorbide Nitrate Rash     Rash, hives, had to use inhaler.    • Movantik [Naloxegol] Rash   • Clarithromycin Rash   • Clindamycin/Lincomycin Rash   • Contrast Dye Itching and Rash   • Diltiazem Rash   • Gabapentin Rash   • Keflex [Cephalexin] Rash   • Metoprolol Rash   • Prednisone Rash " and Other (See Comments)     Face, feet, and legs go completely numb per patient   • Robitussin Cough+ Chest Max St [Dextromethorphan-Guaifenesin] Itching   • Shrimp (Diagnostic) Rash   • Spironolactone Rash   • Viibryd [Vilazodone Hcl] Itching and Rash   • Zoloft [Sertraline Hcl] Hives and Itching           Review of Systems:   Review of Systems   Constitutional: Positive for chills.   Eyes: Positive for pain and itching.   Respiratory: Positive for cough, chest tightness, shortness of breath and wheezing.    Cardiovascular: Positive for leg swelling.   Gastrointestinal: Negative.    Endocrine: Positive for cold intolerance.   Genitourinary: Negative.    Musculoskeletal: Positive for back pain, joint swelling and neck pain.   Skin: Negative.    Allergic/Immunologic: Positive for food allergies.   Neurological: Positive for dizziness, seizures, weakness, light-headedness, numbness and headaches.   Hematological: Bruises/bleeds easily.        Blood Clots   Psychiatric/Behavioral: Positive for sleep disturbance. The patient is nervous/anxious.            Physical Exam:   Physical Exam   Constitutional: He is oriented to person, place, and time. No distress.   HENT:   Head: Normocephalic and atraumatic.   Right Ear: External ear normal.   Left Ear: External ear normal.   Eyes: Conjunctivae and EOM are normal. Right eye exhibits no discharge. Left eye exhibits no discharge.   Neck: Normal range of motion. Neck supple.   Cardiovascular: Normal rate, regular rhythm and normal heart sounds.   No murmur heard.  Pulmonary/Chest: Effort normal and breath sounds normal. No respiratory distress. He has no wheezes.   Abdominal: Soft. He exhibits no distension. There is no guarding.   Neurological: He is alert and oriented to person, place, and time.   Skin: Skin is warm and dry. Capillary refill takes less than 2 seconds. He is not diaphoretic.   Psychiatric: He has a normal mood and affect. His behavior is normal. Judgment and  "thought content normal.   Nursing note and vitals reviewed.    GENERAL: 49 y.o. male, alert and oriented X 3 in no acute distress.   Visit Vitals  /70   Pulse 81   Temp 98 °F (36.7 °C)   Ht 170.2 cm (67\")   Wt 101 kg (222 lb)   BMI 34.77 kg/m²         Musculoskeletal:   Examination right elbow evaluation reveals mild swelling over the posterior lateral aspect superficial to the lateral epicondyle with exquisite tenderness to palpation along the lateral epicondyle and conjoined tendon and marked discomfort with resisted wrist extension he demonstrates normal neurovascular status has full mobility with lection extension pronation supination and no instability.      Assessment & Plan:   49 y.o. male presents with findings consistent with lateral epicondylitis right elbow now of 2 years duration fairly severe the past year he has failed steroid injection and refuses to consider any further injections.  He is requesting definitive treatment of his elbow pain.  We discussed physical therapy he has declined.  We discussed these lateral epicondyles surgically.  After long discussion he wishes to proceed he understands that he may not receive full benefit he also understands that he will need to be compliant afterwards and restricting his activity he is fully prepared for this.  Surgery is therefore scheduled pending cardiac clearance.      ICD-10-CM ICD-9-CM   1. Lateral epicondylitis, right elbow M77.11 726.32           Cc:   Tiera Valenzuela APRN              This document has been electronically signed by Jose Ruiz MD   July 9, 2020 19:12                "

## 2020-07-09 ENCOUNTER — OFFICE VISIT (OUTPATIENT)
Dept: FAMILY MEDICINE CLINIC | Facility: CLINIC | Age: 50
End: 2020-07-09

## 2020-07-09 VITALS
OXYGEN SATURATION: 98 % | TEMPERATURE: 97.1 F | BODY MASS INDEX: 35 KG/M2 | WEIGHT: 223 LBS | HEIGHT: 67 IN | SYSTOLIC BLOOD PRESSURE: 118 MMHG | HEART RATE: 85 BPM | DIASTOLIC BLOOD PRESSURE: 78 MMHG

## 2020-07-09 DIAGNOSIS — G89.29 CHRONIC BILATERAL LOW BACK PAIN WITH LEFT-SIDED SCIATICA: ICD-10-CM

## 2020-07-09 DIAGNOSIS — J30.1 SEASONAL ALLERGIC RHINITIS DUE TO POLLEN: ICD-10-CM

## 2020-07-09 DIAGNOSIS — I10 ESSENTIAL HYPERTENSION: ICD-10-CM

## 2020-07-09 DIAGNOSIS — M94.262 CHONDROMALACIA OF LEFT KNEE: ICD-10-CM

## 2020-07-09 DIAGNOSIS — M54.42 CHRONIC BILATERAL LOW BACK PAIN WITH LEFT-SIDED SCIATICA: ICD-10-CM

## 2020-07-09 PROCEDURE — 99214 OFFICE O/P EST MOD 30 MIN: CPT | Performed by: NURSE PRACTITIONER

## 2020-07-09 RX ORDER — ATORVASTATIN CALCIUM 40 MG/1
40 TABLET, FILM COATED ORAL DAILY
Qty: 90 TABLET | Refills: 3 | Status: SHIPPED | OUTPATIENT
Start: 2020-07-09 | End: 2021-02-08 | Stop reason: SDUPTHER

## 2020-07-09 RX ORDER — HYDROCODONE BITARTRATE AND ACETAMINOPHEN 10; 325 MG/1; MG/1
1 TABLET ORAL 2 TIMES DAILY PRN
Qty: 60 TABLET | Refills: 0 | Status: SHIPPED | OUTPATIENT
Start: 2020-07-09 | End: 2020-08-03 | Stop reason: SDUPTHER

## 2020-07-09 RX ORDER — LORATADINE 10 MG/1
10 TABLET ORAL DAILY PRN
Qty: 30 TABLET | Refills: 5 | Status: SHIPPED | OUTPATIENT
Start: 2020-07-09 | End: 2021-01-04 | Stop reason: SDUPTHER

## 2020-07-09 RX ORDER — LISINOPRIL 20 MG/1
40 TABLET ORAL DAILY
Qty: 60 TABLET | Refills: 5
Start: 2020-07-09 | End: 2020-07-27 | Stop reason: SDUPTHER

## 2020-07-09 RX ORDER — ERGOCALCIFEROL 1.25 MG/1
50000 CAPSULE ORAL
Qty: 5 CAPSULE | Refills: 5 | Status: SHIPPED | OUTPATIENT
Start: 2020-07-09 | End: 2021-01-04 | Stop reason: SDUPTHER

## 2020-07-09 RX ORDER — AZITHROMYCIN 250 MG/1
TABLET, FILM COATED ORAL
Qty: 6 TABLET | Refills: 0 | Status: SHIPPED | OUTPATIENT
Start: 2020-07-09 | End: 2020-07-21

## 2020-07-09 NOTE — PROGRESS NOTES
"Subjective   Jaquan Mckay is a 49 y.o. male.     Chief Complaint   Patient presents with   • Back Pain   • Knee Pain   • Hypertension       History of Present Illness     Fall-reports his right foot popped and he went down onto his foot and elbow.  He reports he also fell onto his knee but did not hit his head.  He reports someone had to help him get up.    Right elbow pain-he reports he will be having surgery on his elbow due to a nerve impingement.  He has had a cortisone injection \"in January\".  He also has some ligament \"problem\".  Reports \"needs cut\" to get \"nerve out\".  He reports numbness in first 3 digits.    HTN-chronic and ongoing.  On Lisinopril 20 mg as directed. No associated symptoms such as CP, SOA, HA, or dizziness.  Asthma-reports doing \"not too bad\".  Has to use Rescue inhaler last week.  Doing well with Advair.  No negative side effects.   HA-reports some increase in HA.  Mild Dizziness.  He reports he has been using his walker inside his home for ambulation. Left ear has been popping. Some sinus pressure and \"sores in my nose\".  He has taken his immunotherapy.   Obesity-has been taking an OTC appetite curb medication (lipozine).  He reports it has helped and he has been walking as much as able.  He reports has been taking supplement for approx 1 month.  He has been working on portion control.        The following portions of the patient's history were reviewed and updated as appropriate: CC, ROS, allergies, current medications, past family history, past medical history, past social history, past surgical history and problem list.    Review of Systems   Constitutional: Negative for appetite change, fatigue and unexpected weight change.   HENT: Negative for congestion, ear pain, nosebleeds, postnasal drip, rhinorrhea, sore throat, trouble swallowing and voice change.    Eyes: Negative for photophobia, pain and visual disturbance.   Respiratory: Negative for cough, chest tightness, shortness of " "breath and wheezing.    Cardiovascular: Negative for chest pain and palpitations.   Gastrointestinal: Positive for abdominal pain and diarrhea. Negative for blood in stool, constipation and nausea.   Endocrine: Negative for cold intolerance and polydipsia.   Genitourinary: Negative for difficulty urinating, flank pain, frequency and hematuria.   Musculoskeletal: Positive for arthralgias and back pain. Negative for gait problem, joint swelling and myalgias.   Skin: Negative for color change and rash.   Allergic/Immunologic: Negative.    Neurological: Positive for dizziness, numbness and headaches. Negative for syncope.   Hematological: Negative.    Psychiatric/Behavioral: Negative for dysphoric mood, sleep disturbance and suicidal ideas. The patient is not nervous/anxious.    All other systems reviewed and are negative.      Objective     /78   Pulse 85   Temp 97.1 °F (36.2 °C) (Temporal)   Ht 170.2 cm (67\")   Wt 101 kg (223 lb)   SpO2 98%   BMI 34.93 kg/m²     Physical Exam   Constitutional: He is oriented to person, place, and time. He appears well-developed and well-nourished. No distress.   HENT:   Head: Normocephalic and atraumatic.   Right Ear: Hearing, tympanic membrane, external ear and ear canal normal.   Left Ear: Hearing, tympanic membrane, external ear and ear canal normal.   Oropharynx not examined.  Patient is presently wearing a face covering/mask due to COVID-19 pandemic.   Eyes: Pupils are equal, round, and reactive to light. Conjunctivae, EOM and lids are normal. No scleral icterus. Right eye exhibits normal extraocular motion and no nystagmus. Left eye exhibits normal extraocular motion and no nystagmus.   Neck: Normal range of motion. Neck supple. No JVD present. No tracheal tenderness present. Carotid bruit is not present. No thyromegaly present.   Cardiovascular: Normal rate, regular rhythm, S1 normal, S2 normal, normal heart sounds and intact distal pulses.   No murmur " heard.  Pulmonary/Chest: Effort normal and breath sounds normal. He exhibits no tenderness.   Abdominal: Soft. Bowel sounds are normal. He exhibits no mass. There is no hepatosplenomegaly. There is no tenderness.   Musculoskeletal: He exhibits no edema.        Right elbow: He exhibits decreased range of motion. Tenderness found. Radial head and medial epicondyle tenderness noted.        Right knee: Tenderness found.        Left knee: He exhibits decreased range of motion. Tenderness found. Lateral joint line tenderness noted.        Lumbar back: He exhibits tenderness.        Right hand: He exhibits decreased range of motion and tenderness. He exhibits normal capillary refill and no swelling. Decreased sensation noted. Decreased sensation is present in the radial distribution. Decreased strength noted. He exhibits finger abduction, thumb/finger opposition and wrist extension trouble.        Right foot: There is decreased range of motion, tenderness and swelling. There is normal capillary refill.   Gait slow and cautious.  equal bilaterally. No muscular atrophy or flaccidity.   Lymphadenopathy:     He has no cervical adenopathy.        Right cervical: No superficial cervical adenopathy present.       Left cervical: No superficial cervical adenopathy present.   Neurological: He is alert and oriented to person, place, and time. He has normal strength and normal reflexes. He displays no tremor. No cranial nerve deficit or sensory deficit. He exhibits normal muscle tone. Coordination and gait normal.   Skin: Skin is warm and dry. Capillary refill takes less than 2 seconds. He is not diaphoretic. No cyanosis. No pallor. Nails show no clubbing.   Psychiatric: He has a normal mood and affect. His speech is normal and behavior is normal. Judgment and thought content normal. He is not actively hallucinating. Cognition and memory are normal. He is attentive.   Vitals reviewed.      Assessment/Plan     Problem List Items  Addressed This Visit        Cardiovascular and Mediastinum    Hypertension    Relevant Medications    lisinopril (PRINIVIL,ZESTRIL) 20 MG tablet       Respiratory    Seasonal allergic rhinitis due to pollen    Relevant Medications    loratadine (CLARITIN) 10 MG tablet       Nervous and Auditory    Chronic bilateral low back pain with left-sided sciatica    Relevant Medications    HYDROcodone-acetaminophen (NORCO)  MG per tablet       Musculoskeletal and Integument    Chondromalacia, knee    Relevant Medications    HYDROcodone-acetaminophen (NORCO)  MG per tablet          Patient's Body mass index is 34.93 kg/m². BMI is above normal parameters. Recommendations include: nutrition counseling.       Understands disease processes and need for medications.  Understands reasons for urgent and emergent care.  Patient (& family) verbalized agreement for treatment plan.   Emotional support and active listening provided.  Patient provided time to verbalize feelings.    CHAVEZ #[id number] reviewed today and consistent.  Will refill prescribed controlled medication today.  Patient is aware they cannot receive narcotics from any other provider except if under care of pain management or speciality clinic.  Risk and benefits of medication use has been reviewed.  History and physical exam exhibit continued safe and appropriate use of controlled substances.  The patient is aware of the potential for addiction and dependence.  This patient has been made aware of the appropriate use of such medications, including potential risk of somnolence, limited ability to drive and / or work safely, and potential for overdose.  Patient understands not to take medication and drive until they know how medication may affect their cognition/decision making.   It has also been made clear that these medications are for use by this patient only, without concomitant use of alcohol or other substances unless prescribed/advised by medical  provider.  Patient understands they may be subject to UDS and pill counts at random.      Patient considered to be low risk for addiction due to use of single controlled medications.  Patient understands and accepts these risks.  Patient need for medication will be reassessed at each visit.  Doses will be adjusted according to patient need and findings.    Goal of TX: Patient will not have any adverse reactions of medication.  Patient have reduction in pain symptoms with use of PRN Norco as directed.  Patient will not have any adverse side effects from medication.  Patient will be able to remain active in an outside of home without interference from pain symptoms.    Continue under care of Dr Molina.     Continue under care of allergy provider.      Discussed that Lipozine may increase diarrhea symptoms.      Instructed to complete all of antibiotics for acute illness.  Increase PO fluids, avoid/limit caffeine.  Do not save any of the meds for later use.  Rest PRN    RTC 1 month, sooner if needed for problems or concerns          This document has been electronically signed by:  BALDOMERO Thao, FNP-C    Dragon disclaimer:  Much of this encounter note is an electronic transcription/translation of spoken language to printed text. The electronic translation of spoken language may permit erroneous, or at times, nonsensical words or phrases to be inadvertently transcribed; Although I have reviewed the note for such errors, some may still exist.

## 2020-07-13 ENCOUNTER — TELEPHONE (OUTPATIENT)
Dept: CARDIOLOGY | Facility: CLINIC | Age: 50
End: 2020-07-13

## 2020-07-13 NOTE — TELEPHONE ENCOUNTER
Pt called today in regards for his surgery clearance for a right elbow lateral release under general anesthesia. The request has been placed on [facility] desk for review then she will place it on [employer] desk for him to sign off on it.

## 2020-07-14 ENCOUNTER — HOSPITAL ENCOUNTER (EMERGENCY)
Facility: HOSPITAL | Age: 50
Discharge: HOME | End: 2020-07-15
Attending: FAMILY MEDICINE | Admitting: FAMILY MEDICINE

## 2020-07-14 ENCOUNTER — APPOINTMENT (OUTPATIENT)
Dept: GENERAL RADIOLOGY | Facility: HOSPITAL | Age: 50
End: 2020-07-14

## 2020-07-14 DIAGNOSIS — S63.259A CLOSED DISLOCATION OF FINGER OF RIGHT HAND, INITIAL ENCOUNTER: Primary | ICD-10-CM

## 2020-07-14 PROCEDURE — 73140 X-RAY EXAM OF FINGER(S): CPT

## 2020-07-14 PROCEDURE — 99285 EMERGENCY DEPT VISIT HI MDM: CPT

## 2020-07-14 NOTE — TELEPHONE ENCOUNTER
Called pt and advised him that  has signed his clearance and I have faxed it. He expressed understanding and stated he will come by and get a copy.

## 2020-07-15 ENCOUNTER — PREP FOR SURGERY (OUTPATIENT)
Dept: OTHER | Facility: HOSPITAL | Age: 50
End: 2020-07-15

## 2020-07-15 ENCOUNTER — APPOINTMENT (OUTPATIENT)
Dept: GENERAL RADIOLOGY | Facility: HOSPITAL | Age: 50
End: 2020-07-15

## 2020-07-15 VITALS
HEIGHT: 67 IN | BODY MASS INDEX: 34.84 KG/M2 | SYSTOLIC BLOOD PRESSURE: 140 MMHG | DIASTOLIC BLOOD PRESSURE: 92 MMHG | TEMPERATURE: 97.6 F | OXYGEN SATURATION: 100 % | WEIGHT: 222 LBS | RESPIRATION RATE: 16 BRPM | HEART RATE: 78 BPM

## 2020-07-15 DIAGNOSIS — M77.11 LATERAL EPICONDYLITIS OF RIGHT ELBOW: Primary | ICD-10-CM

## 2020-07-15 PROCEDURE — 73140 X-RAY EXAM OF FINGER(S): CPT

## 2020-07-15 PROCEDURE — 25010000002 PROPOFOL 10 MG/ML EMULSION: Performed by: FAMILY MEDICINE

## 2020-07-15 RX ORDER — PROPOFOL 10 MG/ML
VIAL (ML) INTRAVENOUS
Status: COMPLETED | OUTPATIENT
Start: 2020-07-15 | End: 2020-07-15

## 2020-07-15 RX ORDER — HYDROCODONE BITARTRATE AND ACETAMINOPHEN 5; 325 MG/1; MG/1
1 TABLET ORAL ONCE
Status: COMPLETED | OUTPATIENT
Start: 2020-07-15 | End: 2020-07-15

## 2020-07-15 RX ORDER — PROPOFOL 10 MG/ML
VIAL (ML) INTRAVENOUS
Status: DISCONTINUED
Start: 2020-07-15 | End: 2020-07-15 | Stop reason: HOSPADM

## 2020-07-15 RX ADMIN — HYDROCODONE BITARTRATE AND ACETAMINOPHEN 1 TABLET: 5; 325 TABLET ORAL at 02:42

## 2020-07-15 RX ADMIN — PROPOFOL 60 MG: 10 INJECTION, EMULSION INTRAVENOUS at 00:41

## 2020-07-15 RX ADMIN — PROPOFOL 40 MG: 10 INJECTION, EMULSION INTRAVENOUS at 00:42

## 2020-07-15 NOTE — ED NOTES
Out of room at this time, patient awake, alert and oriented x4.     Dewey Moralez, RN  07/15/20 0745

## 2020-07-15 NOTE — ED NOTES
Patient presents to the ER after a finger injury prior to arrival. Patient states that he was playing basketball with his friends and reports he dislocated middle finger to right hand. Patient has noted deformity to third metacarpal to right hand. Patient maintained capillary refill 2-3 seconds to metacarpal, radial pulses palpable.      Dewey Moralez, RN  07/15/20 0751

## 2020-07-15 NOTE — ED NOTES
Conscious sedation discharge instructions discussed with patient and patient's brother, instructed patient's brother to return patient to the ER with any issues, understanding verbalized.     Dewey Moralez, RN  07/15/20 [id number]

## 2020-07-15 NOTE — ED NOTES
Consent for closed reduction of third metacarpal to right hand with conscious sedation obtained at this time. Patient set up for conscious sedation at this time, placed on cardiac monitor, nasal cannula and ambu bag at bedside. Suction set up.     Dewey Moralez, CARLOS  07/15/20 0005

## 2020-07-15 NOTE — ED NOTES
Third metacarpal to right hand reduced at this time by Dr. Cronin, radiology contact for post reduction x-ray.     Dewey Moralez, RN  07/15/20 [id number]

## 2020-07-15 NOTE — ED PROVIDER NOTES
CC: finger dislocation    HPI: 49 year old male presents to the ED with right middle finger dislocation. Patient states he was playing basketball and swatted at the ball approx 30 mins PTA. Pt is unable to move right middle finger. Pain elicited to palpation. Denies previous injury.    ROS: right middle finger dislocation    PE: Constitutional - well developed. Appears to be in no distress. Cardio - RRR. No murmurs noted. Respiratory - respirations even and unlabored. No wheezing, rhonchi, or rales noted. Lungs sounds CTAB. Musculoskeletal - right middle finger dislocation. RUE neurovascularly intact. Right middle finger decreased ROM. Neuro - A&O x 4.     Alanna Rogers, PA-C  07/15/20 0107

## 2020-07-15 NOTE — ED PROVIDER NOTES
"Subjective   49-year-old white male presents the emergency department after while playing basketball he dislocated his finger.      History provided by:  Patient  Finger Injury   Location:  Right hand 3rd finger dislocation  Severity:  Severe  Onset quality:  Sudden  Timing:  Constant  Progression:  Unchanged  Chronicity:  New  Context:  See hpi  Relieved by:  T  Worsened by:  Nothing  Ineffective treatments:  None  Associated symptoms: no abdominal pain, no chest pain and no fever        Review of Systems   Constitutional: Negative.  Negative for fever.   HENT: Negative.    Respiratory: Negative.    Cardiovascular: Negative.  Negative for chest pain.   Gastrointestinal: Negative.  Negative for abdominal pain.   Endocrine: Negative.    Genitourinary: Negative.  Negative for dysuria.   Skin: Negative.    Neurological: Negative.    Psychiatric/Behavioral: Negative.    All other systems reviewed and are negative.      Past Medical History:   Diagnosis Date   • Allergic    • Anxiety    • Arthritis    • Asthma    • Body piercing     REPORTS CYLICONE IN EARS   • Clotting disorder (CMS/[facility]) 2004    had a knee surgery   • Depression    • DVT (deep venous thrombosis) (CMS/[facility])     RIGHT RIGHT KNEE AFTER SURGERY YEARS AGO IN 2001 OR 2004   • Gastric ulcer    • GERD (gastroesophageal reflux disease)    • H/O migraine    • H/O sleep apnea     REPORTS DIAGNOSED WITH SLEEP APNEA AND COULDN'T STAND TO WEAR THE MACHINE   • Headache    • Heart attack (CMS/[facility])     REPORTS \"LIGHT HEART ATTACK A LONG TIME AGO\"  \"EARLY 90'S\"   • History of seizures     REPORTS LAST EPISODE WAS AROUND 1995.   • Hostility    • Hyperlipidemia    • Hypertension    • Knee pain, acute     Left   • Low back pain    • Migraine    • MRSA (methicillin resistant Staphylococcus aureus)     REPORTS LAST TESTED + 2004. WAS TREATED HE REPORTS.  RIGHT ARM, RIGHT KNEE.   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    • Obesity    • Poor historian    • Carl Mountain spotted fever    • Seizures " "(CMS/[facility])    • Tattoo    • Wears glasses        Allergies   Allergen Reactions   • Ciprofloxacin Anaphylaxis and Hives   • Mobic [Meloxicam] Other (See Comments)     Pt states, \"It make my feet and hands go numb and I can't hardly walk.\"    • Paxil [Paroxetine Hcl] Shortness Of Breath     Chest pain    • Peanut-Containing Drug Products Anaphylaxis   • Penicillins Anaphylaxis   • Pristiq [Desvenlafaxine Succinate Er] Dizziness   • Sulfa Antibiotics Anaphylaxis, Itching and Rash   • Doxycycline Hives   • Fish-Derived Products Hives   • Isosorbide Nitrate Rash     Rash, hives, had to use inhaler.    • Movantik [Naloxegol] Rash   • Clarithromycin Rash   • Clindamycin/Lincomycin Rash   • Contrast Dye Itching and Rash   • Diltiazem Rash   • Gabapentin Rash   • Keflex [Cephalexin] Rash   • Metoprolol Rash   • Prednisone Rash and Other (See Comments)     Face, feet, and legs go completely numb per patient   • Robitussin Cough+ Chest Max St [Dextromethorphan-Guaifenesin] Itching   • Shrimp (Diagnostic) Rash   • Spironolactone Rash   • Viibryd [Vilazodone Hcl] Itching and Rash   • Zoloft [Sertraline Hcl] Hives and Itching       Past Surgical History:   Procedure Laterality Date   • BACK SURGERY     • BRAIN SURGERY  1986    Tumor removal    • CARDIAC CATHETERIZATION N/A 9/28/2018    Procedure: Left Heart Cath;  Surgeon: Leandro Daily MD;  Location:  [facility] CATH INVASIVE LOCATION;  Service: Cardiology   • CHOLECYSTECTOMY     • CYST REMOVAL     • KNEE ARTHROSCOPY Left 10/20/2017    Procedure: Diagnostic arthroscopy left knee with chondroplasty;  Surgeon: Marco Aguirre MD;  Location: [facility] OR;  Service:    • KNEE SURGERY Right    • MOUTH SURGERY      FULL MOUTH EXTRACTION   • OTHER SURGICAL HISTORY      REPORTS 7 TICKS REMOVED FROM RIGHT ARM IN 2001 OR 2002   • TUMOR EXCISION      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       Family History   Problem Relation Age of Onset   • Diabetes Mother    • Hypertension Mother  "   • Stroke Mother    • Diabetes Father    • Skin cancer Father    • Hypertension Father    • Heart attack Father    • Diabetes Brother    • Hypertension Brother    • Heart disease Maternal Aunt    • Heart disease Maternal Uncle    • Heart disease Paternal Aunt    • Heart disease Paternal Uncle    • Heart disease Maternal Grandmother    • Heart disease Maternal Grandfather    • Heart disease Paternal Grandmother    • Heart disease Paternal Grandfather        Social History     Socioeconomic History   • Marital status:      Spouse name: Becca   • Number of children: 2   • Years of education: 12   • Highest education level: Not on file   Occupational History   • Occupation: DISABLED   Social Needs   • Financial resource strain: Somewhat hard   • Food insecurity:     Worry: Sometimes true     Inability: Sometimes true   • Transportation needs:     Medical: No     Non-medical: No   Tobacco Use   • Smoking status: Current Every Day Smoker     Packs/day: 1.50     Years: 17.00     Pack years: 25.50     Types: Cigars, Cigarettes     Start date: 5/5/2010   • Smokeless tobacco: Never Used   • Tobacco comment: still uses 1.5 packs a day.   Substance and Sexual Activity   • Alcohol use: No   • Drug use: No   • Sexual activity: Defer   Lifestyle   • Physical activity:     Days per week: 0 days     Minutes per session: 0 min   • Stress: To some extent   Relationships   • Social connections:     Talks on phone: Once a week     Gets together: Once a week     Attends Latter day service: Never     Active member of club or organization: No     Attends meetings of clubs or organizations: Never     Relationship status:            Objective   Physical Exam   Constitutional: He is oriented to person, place, and time. He appears well-developed and well-nourished. He appears distressed.   HENT:   Head: Normocephalic and atraumatic.   Right Ear: External ear normal.   Left Ear: External ear normal.   Mouth/Throat: Oropharynx  is clear and moist.   Eyes: Pupils are equal, round, and reactive to light. Conjunctivae and EOM are normal.   Neck: Neck supple.   Cardiovascular: Normal rate and regular rhythm.   Pulmonary/Chest: Effort normal and breath sounds normal.   Abdominal: Soft. Bowel sounds are normal.   Musculoskeletal: He exhibits tenderness.   Neurological: He is alert and oriented to person, place, and time.   Skin: Skin is warm. Capillary refill takes less than 2 seconds.   Psychiatric: He has a normal mood and affect. His behavior is normal. Judgment and thought content normal.   Nursing note and vitals reviewed.      FX Dislocation  Date/Time: 7/15/2020 8:52 PM  Performed by: Rosanne Cronin DO  Authorized by: Rosanne Cronin DO     Consent:     Consent obtained:  Verbal and written    Consent given by:  Patient    Risks discussed:  Nerve damage, pain and vascular damage    Alternatives discussed:  Delayed treatment  Injury:     Injury location:  Finger    Finger injury location:  R long finger  Pre-procedure assessment:     Neurological function: normal      Distal perfusion: normal      Range of motion: reduced    Sedation:     Sedation type:  Moderate (conscious) sedation  Anesthesia (see MAR for exact dosages):     Anesthesia method:  None  Procedure details:     Manipulation performed: yes      Skin traction used: no      Skeletal traction used: yes      Pin inserted: no      Reduction successful: yes      X-ray confirmed reduction: yes      Immobilization:  Brace  Post-procedure assessment:     Neurological function: normal      Distal perfusion: normal      Range of motion: normal      Patient tolerance of procedure:  Tolerated well, no immediate complications               ED Course                                           MDM  Number of Diagnoses or Management Options  Closed dislocation of finger of right hand, initial encounter: new and requires workup     Amount and/or Complexity of Data  Reviewed  Clinical lab tests: ordered and reviewed  Tests in the radiology section of CPT®: ordered and reviewed  Tests in the medicine section of CPT®: reviewed and ordered  Independent visualization of images, tracings, or specimens: yes    Risk of Complications, Morbidity, and/or Mortality  Presenting problems: high  Diagnostic procedures: high  Management options: high    Patient Progress  Patient progress: improved      Final diagnoses:   Closed dislocation of finger of right hand, initial encounter            Rosanne Cronin DO  07/17/20 2050

## 2020-07-20 ENCOUNTER — TRANSCRIBE ORDERS (OUTPATIENT)
Dept: ADMINISTRATIVE | Facility: HOSPITAL | Age: 50
End: 2020-07-20

## 2020-07-20 DIAGNOSIS — Z01.818 OTHER SPECIFIED PRE-OPERATIVE EXAMINATION: Primary | ICD-10-CM

## 2020-07-21 ENCOUNTER — APPOINTMENT (OUTPATIENT)
Dept: PREADMISSION TESTING | Facility: HOSPITAL | Age: 50
End: 2020-07-21

## 2020-07-21 ENCOUNTER — LAB (OUTPATIENT)
Dept: LAB | Facility: HOSPITAL | Age: 50
End: 2020-07-21

## 2020-07-21 DIAGNOSIS — Z01.818 OTHER SPECIFIED PRE-OPERATIVE EXAMINATION: ICD-10-CM

## 2020-07-21 DIAGNOSIS — M77.11 LATERAL EPICONDYLITIS OF RIGHT ELBOW: ICD-10-CM

## 2020-07-21 PROCEDURE — U0004 COV-19 TEST NON-CDC HGH THRU: HCPCS

## 2020-07-21 PROCEDURE — U0002 COVID-19 LAB TEST NON-CDC: HCPCS

## 2020-07-21 PROCEDURE — C9803 HOPD COVID-19 SPEC COLLECT: HCPCS

## 2020-07-21 RX ORDER — METHOCARBAMOL 750 MG/1
750 TABLET, FILM COATED ORAL 2 TIMES DAILY PRN
COMMUNITY
End: 2020-11-03 | Stop reason: SDUPTHER

## 2020-07-21 NOTE — DISCHARGE INSTRUCTIONS
7/23/20  ARRIVAL TIME PER DR OFFICE    TAKE the following medications the morning of surgery:  All heart or blood pressure medications    HOLD all diabetic medications the morning of surgery as ordered by physician.    Please discontinue all blood thinners and anticoagulants (except aspirin) prior to surgery as per your surgeon and cardiologist instructions.  Aspirin may be continued up to the day prior to surgery.     CHLORHEXIDINE CLOTHS GIVEN WITH INSTRUCTIONS AND FORM TO RETURN TO HOSPITAL, IF APPLICABLE.    General Instructions:  · Do not eat or drink after midnight: includes water, mints, or gum. You may brush your teeth.  Dental appliances that are removable must be taken out day of surgery.  · Do not smoke, chew tobacco, or drink alcohol.  · Bring medications in original bottles, any inhalers and if applicable your C-PAP/BI-PAP machine.  · Bring any papers given to you in the doctor's office.  · Wear clean comfortable clothes and socks.  · Do not wear contact lenses or make-up. Bring a case for your glasses if applicable.  · Bring crutches or walker if applicable.  · Leave all other valuables and jewelry at home.    If you were given a blood bank ID arm band remember to bring it with you the day of surgery.    Preventing a Surgical Site Infection:  Shower the night before surgery (unless instructed other wise) using a fresh bar of anti-bacterial soap (such as Dial) and clean washcloth. Dry with a clean towel and dress in clean clothing.  For 2 to 3 days before surgery, avoid shaving with a razor near where you will have surgery because the razor can irritate skin and make it easier to develop an infection. Ask your surgeon if you will be receiving antibiotics prior to surgery.  Make sure you, your family, and all healthcare providers clear their hands with soap and water or an alcohol-based hand  before caring for you or your wound.  If at all possible, quit smoking as many days before surgery as  you can.    Day of surgery:  Upon arrival, a Pre-op nurse and Anesthesiologist will review your health history, obtain vital signs, and answer questions you may have. The only belongings needed at this time will be your home medications and if applicable your C-PAP/BI-PAP machine. If you are staying overnight your family can leave the rest of your belongings in the car and bring them to your room later. A Pre-op nurse will start an IV and you may receive medication in preparation for surgery, including something to help you relax. Your family will be able to see you in the Pre-op area. While you are in surgery your family should notify the waiting room  if they leave the waiting room area and provide a contact phone number.    Please be aware that surgery does come with discomfort. We want to make every effort to control your discomfort so please discuss any uncontrolled symptoms with your nurse. Your doctor will most likely have prescribed pain medications.    If you are going home after surgery you will receive individualized written care instructions before being discharged. A responsible adult must drive you to and from the hospital on the day of surgery and stay with you for 24 hours.    If you are staying overnight following surgery, you will be transported to your hospital room following the recovery period.  [facility] has all private rooms.    If you have any questions please call Pre-Admission Testing at [phone].  Deductibles and co-payments are collected on the day of service. Please be prepared to pay the required co-pay, deductible or deposit on the day of service as defined by your plan.    A RESPONSIBLE PERSON MUST REMAIN IN THE WAITING ROOM DURING YOUR PROCEDURE AND A RESPONSIBLE  MUST BE AVAILABLE UPON YOUR DISCHARGE.

## 2020-07-22 ENCOUNTER — ANESTHESIA EVENT (OUTPATIENT)
Dept: PERIOP | Facility: HOSPITAL | Age: 50
End: 2020-07-22

## 2020-07-22 LAB
REF LAB TEST METHOD: NORMAL
SARS-COV-2 RNA RESP QL NAA+PROBE: NOT DETECTED

## 2020-07-23 ENCOUNTER — NURSE TRIAGE (OUTPATIENT)
Dept: CALL CENTER | Facility: HOSPITAL | Age: 50
End: 2020-07-23

## 2020-07-23 ENCOUNTER — HOSPITAL ENCOUNTER (OUTPATIENT)
Facility: HOSPITAL | Age: 50
Setting detail: HOSPITAL OUTPATIENT SURGERY
Discharge: HOME | End: 2020-07-23
Attending: ORTHOPAEDIC SURGERY | Admitting: ORTHOPAEDIC SURGERY

## 2020-07-23 ENCOUNTER — ANESTHESIA (OUTPATIENT)
Dept: PERIOP | Facility: HOSPITAL | Age: 50
End: 2020-07-23

## 2020-07-23 ENCOUNTER — HOSPITAL ENCOUNTER (EMERGENCY)
Facility: HOSPITAL | Age: 50
Discharge: HOME | End: 2020-07-23
Attending: EMERGENCY MEDICINE | Admitting: EMERGENCY MEDICINE

## 2020-07-23 VITALS
TEMPERATURE: 98.2 F | RESPIRATION RATE: 18 BRPM | DIASTOLIC BLOOD PRESSURE: 86 MMHG | BODY MASS INDEX: 35.53 KG/M2 | SYSTOLIC BLOOD PRESSURE: 131 MMHG | WEIGHT: 226.4 LBS | HEIGHT: 67 IN | OXYGEN SATURATION: 98 % | HEART RATE: 86 BPM

## 2020-07-23 VITALS
BODY MASS INDEX: 35.53 KG/M2 | WEIGHT: 226.4 LBS | HEART RATE: 70 BPM | HEIGHT: 67 IN | OXYGEN SATURATION: 100 % | TEMPERATURE: 99 F | SYSTOLIC BLOOD PRESSURE: 131 MMHG | RESPIRATION RATE: 16 BRPM | DIASTOLIC BLOOD PRESSURE: 85 MMHG

## 2020-07-23 DIAGNOSIS — M77.11 LATERAL EPICONDYLITIS OF RIGHT ELBOW: ICD-10-CM

## 2020-07-23 DIAGNOSIS — M79.601 RIGHT ARM PAIN: Primary | ICD-10-CM

## 2020-07-23 PROCEDURE — 25010000002 HYDROMORPHONE PER 4 MG: Performed by: NURSE ANESTHETIST, CERTIFIED REGISTERED

## 2020-07-23 PROCEDURE — 24359 REPAIR ELBOW DEB/ATTCH OPEN: CPT | Performed by: ORTHOPAEDIC SURGERY

## 2020-07-23 PROCEDURE — 25010000002 FENTANYL CITRATE (PF) 100 MCG/2ML SOLUTION: Performed by: NURSE ANESTHETIST, CERTIFIED REGISTERED

## 2020-07-23 PROCEDURE — C1713 ANCHOR/SCREW BN/BN,TIS/BN: HCPCS | Performed by: ORTHOPAEDIC SURGERY

## 2020-07-23 PROCEDURE — 25010000002 SUCCINYLCHOLINE PER 20 MG: Performed by: NURSE ANESTHETIST, CERTIFIED REGISTERED

## 2020-07-23 PROCEDURE — 25010000002 ONDANSETRON PER 1 MG: Performed by: NURSE ANESTHETIST, CERTIFIED REGISTERED

## 2020-07-23 PROCEDURE — 99283 EMERGENCY DEPT VISIT LOW MDM: CPT

## 2020-07-23 PROCEDURE — 25010000002 PROPOFOL 10 MG/ML EMULSION: Performed by: NURSE ANESTHETIST, CERTIFIED REGISTERED

## 2020-07-23 PROCEDURE — 94799 UNLISTED PULMONARY SVC/PX: CPT

## 2020-07-23 DEVICE — SUT/ANCH BIOCOMP SUTTAK 1 SUT W/NDL NO1 3X14: Type: IMPLANTABLE DEVICE | Site: ELBOW | Status: FUNCTIONAL

## 2020-07-23 RX ORDER — FENTANYL CITRATE 50 UG/ML
INJECTION, SOLUTION INTRAMUSCULAR; INTRAVENOUS AS NEEDED
Status: DISCONTINUED | OUTPATIENT
Start: 2020-07-23 | End: 2020-07-23 | Stop reason: SURG

## 2020-07-23 RX ORDER — IPRATROPIUM BROMIDE AND ALBUTEROL SULFATE 2.5; .5 MG/3ML; MG/3ML
3 SOLUTION RESPIRATORY (INHALATION) ONCE AS NEEDED
Status: DISCONTINUED | OUTPATIENT
Start: 2020-07-23 | End: 2020-07-23 | Stop reason: HOSPADM

## 2020-07-23 RX ORDER — HYDROMORPHONE HYDROCHLORIDE 1 MG/ML
0.5 INJECTION, SOLUTION INTRAMUSCULAR; INTRAVENOUS; SUBCUTANEOUS
Status: DISCONTINUED | OUTPATIENT
Start: 2020-07-23 | End: 2020-07-23 | Stop reason: HOSPADM

## 2020-07-23 RX ORDER — MAGNESIUM HYDROXIDE 1200 MG/15ML
LIQUID ORAL AS NEEDED
Status: DISCONTINUED | OUTPATIENT
Start: 2020-07-23 | End: 2020-07-23 | Stop reason: HOSPADM

## 2020-07-23 RX ORDER — SODIUM CHLORIDE, SODIUM LACTATE, POTASSIUM CHLORIDE, CALCIUM CHLORIDE 600; 310; 30; 20 MG/100ML; MG/100ML; MG/100ML; MG/100ML
125 INJECTION, SOLUTION INTRAVENOUS CONTINUOUS
Status: DISCONTINUED | OUTPATIENT
Start: 2020-07-23 | End: 2020-07-23 | Stop reason: HOSPADM

## 2020-07-23 RX ORDER — FAMOTIDINE 10 MG/ML
INJECTION, SOLUTION INTRAVENOUS AS NEEDED
Status: DISCONTINUED | OUTPATIENT
Start: 2020-07-23 | End: 2020-07-23 | Stop reason: SURG

## 2020-07-23 RX ORDER — OXYCODONE HYDROCHLORIDE AND ACETAMINOPHEN 5; 325 MG/1; MG/1
1 TABLET ORAL ONCE AS NEEDED
Status: COMPLETED | OUTPATIENT
Start: 2020-07-23 | End: 2020-07-23

## 2020-07-23 RX ORDER — SUCCINYLCHOLINE CHLORIDE 20 MG/ML
INJECTION INTRAMUSCULAR; INTRAVENOUS AS NEEDED
Status: DISCONTINUED | OUTPATIENT
Start: 2020-07-23 | End: 2020-07-23 | Stop reason: SURG

## 2020-07-23 RX ORDER — SODIUM CHLORIDE 0.9 % (FLUSH) 0.9 %
10 SYRINGE (ML) INJECTION AS NEEDED
Status: DISCONTINUED | OUTPATIENT
Start: 2020-07-23 | End: 2020-07-23 | Stop reason: HOSPADM

## 2020-07-23 RX ORDER — ONDANSETRON 2 MG/ML
4 INJECTION INTRAMUSCULAR; INTRAVENOUS AS NEEDED
Status: DISCONTINUED | OUTPATIENT
Start: 2020-07-23 | End: 2020-07-23 | Stop reason: HOSPADM

## 2020-07-23 RX ORDER — ROCURONIUM BROMIDE 10 MG/ML
INJECTION, SOLUTION INTRAVENOUS AS NEEDED
Status: DISCONTINUED | OUTPATIENT
Start: 2020-07-23 | End: 2020-07-23 | Stop reason: SURG

## 2020-07-23 RX ORDER — FENTANYL CITRATE 50 UG/ML
50 INJECTION, SOLUTION INTRAMUSCULAR; INTRAVENOUS
Status: DISCONTINUED | OUTPATIENT
Start: 2020-07-23 | End: 2020-07-23 | Stop reason: HOSPADM

## 2020-07-23 RX ORDER — PROPOFOL 10 MG/ML
VIAL (ML) INTRAVENOUS AS NEEDED
Status: DISCONTINUED | OUTPATIENT
Start: 2020-07-23 | End: 2020-07-23 | Stop reason: SURG

## 2020-07-23 RX ORDER — OXYCODONE HYDROCHLORIDE AND ACETAMINOPHEN 5; 325 MG/1; MG/1
1-2 TABLET ORAL EVERY 4 HOURS PRN
Qty: 20 TABLET | Refills: 0 | Status: SHIPPED | OUTPATIENT
Start: 2020-07-23 | End: 2020-09-23

## 2020-07-23 RX ORDER — ONDANSETRON 2 MG/ML
INJECTION INTRAMUSCULAR; INTRAVENOUS AS NEEDED
Status: DISCONTINUED | OUTPATIENT
Start: 2020-07-23 | End: 2020-07-23 | Stop reason: SURG

## 2020-07-23 RX ORDER — SODIUM CHLORIDE 0.9 % (FLUSH) 0.9 %
10 SYRINGE (ML) INJECTION EVERY 12 HOURS SCHEDULED
Status: DISCONTINUED | OUTPATIENT
Start: 2020-07-23 | End: 2020-07-23 | Stop reason: HOSPADM

## 2020-07-23 RX ORDER — BUPIVACAINE HYDROCHLORIDE 5 MG/ML
INJECTION, SOLUTION PERINEURAL AS NEEDED
Status: DISCONTINUED | OUTPATIENT
Start: 2020-07-23 | End: 2020-07-23 | Stop reason: HOSPADM

## 2020-07-23 RX ORDER — MIDAZOLAM HYDROCHLORIDE 1 MG/ML
1 INJECTION INTRAMUSCULAR; INTRAVENOUS
Status: DISCONTINUED | OUTPATIENT
Start: 2020-07-23 | End: 2020-07-23 | Stop reason: HOSPADM

## 2020-07-23 RX ORDER — MEPERIDINE HYDROCHLORIDE 25 MG/ML
12.5 INJECTION INTRAMUSCULAR; INTRAVENOUS; SUBCUTANEOUS
Status: DISCONTINUED | OUTPATIENT
Start: 2020-07-23 | End: 2020-07-23 | Stop reason: HOSPADM

## 2020-07-23 RX ORDER — LIDOCAINE HYDROCHLORIDE 20 MG/ML
INJECTION, SOLUTION INFILTRATION; PERINEURAL AS NEEDED
Status: DISCONTINUED | OUTPATIENT
Start: 2020-07-23 | End: 2020-07-23 | Stop reason: SURG

## 2020-07-23 RX ADMIN — OXYCODONE HYDROCHLORIDE AND ACETAMINOPHEN 1 TABLET: 5; 325 TABLET ORAL at 13:50

## 2020-07-23 RX ADMIN — SUCCINYLCHOLINE CHLORIDE 120 MG: 20 INJECTION, SOLUTION INTRAMUSCULAR; INTRAVENOUS at 11:45

## 2020-07-23 RX ADMIN — PROPOFOL 200 MG: 10 INJECTION, EMULSION INTRAVENOUS at 11:45

## 2020-07-23 RX ADMIN — FENTANYL CITRATE 50 MCG: 50 INJECTION INTRAMUSCULAR; INTRAVENOUS at 12:37

## 2020-07-23 RX ADMIN — SODIUM CHLORIDE, POTASSIUM CHLORIDE, SODIUM LACTATE AND CALCIUM CHLORIDE: 600; 310; 30; 20 INJECTION, SOLUTION INTRAVENOUS at 11:42

## 2020-07-23 RX ADMIN — FENTANYL CITRATE 25 MCG: 50 INJECTION INTRAMUSCULAR; INTRAVENOUS at 11:45

## 2020-07-23 RX ADMIN — FAMOTIDINE 20 MG: 10 INJECTION INTRAVENOUS at 12:22

## 2020-07-23 RX ADMIN — ONDANSETRON 4 MG: 2 INJECTION INTRAMUSCULAR; INTRAVENOUS at 13:31

## 2020-07-23 RX ADMIN — ROCURONIUM BROMIDE 10 MG: 10 SOLUTION INTRAVENOUS at 11:45

## 2020-07-23 RX ADMIN — HYDROMORPHONE HYDROCHLORIDE 0.5 MG: 1 INJECTION, SOLUTION INTRAMUSCULAR; INTRAVENOUS; SUBCUTANEOUS at 13:30

## 2020-07-23 RX ADMIN — LIDOCAINE HYDROCHLORIDE 60 MG: 20 INJECTION, SOLUTION INFILTRATION; PERINEURAL at 11:45

## 2020-07-23 RX ADMIN — ONDANSETRON 4 MG: 2 INJECTION INTRAMUSCULAR; INTRAVENOUS at 12:22

## 2020-07-23 NOTE — OP NOTE
TENNIS ELBOW RELEASE, ELBOW EPICONDYLECTOMY  Procedure Note    Jaquan Mckay  7/23/2020    Pre-op Diagnosis:   Lateral epicondylitis of right elbow [M77.11]    Post-op Diagnosis:     Post-Op Diagnosis Codes:     * Lateral epicondylitis of right elbow [M77.11]    Procedure(s):  RIGHT TENNIS ELBOW RELEASE  LATERAL epicondylectomy.    Surgeon(s):  Jose Ruiz MD    Anesthesia: General/local    Operative technique: With patient in the operating theatre under general anesthesia with right arm placed in tourniquet the right upper extremity sterilely prepped and draped in usual manner.  Extremity was exsanguinated tourniquet inflated to 200 mmHg.  Gently curved incision was made beginning at the lateral epicondyle and extending distally slightly curved with skin divided sharply bluntly exposing the conjoined tendon was exposed and released from the lateral epicondyle and new fashion in the tendon allowed to retract distally.  With osteotome the lateral epicondyle was partially removed decorticating regional insertion of conjoined tendon.  Arthrex 3.0 push lock anchor was selected and placed in the distal portion of the original footprint.  With anchor secured tendon was reattached slightly distal to its original insertion.  Then with #2 Ethibond tendon was reapproximated to surrounding soft tissue on 3 sides.  Hemostasis was obtained with electrocautery.  The wound was closed in multiple layers with 3-0 subcuticular for final closure sterile dressing was applied with a posterior splint tourniquet deflated he was taken to recovery room in stable condition.    Staff:   Circulator: Radha Andujar RN  Scrub Person: Sotero Coon  Assistant: Cirilo Frances    Estimated Blood Loss: none    Specimens:   none               Implants/Grafts: Arthrex push lock anchor      Drains: None    Complications: none    Tourniquet time: 58   min    Jose Ruiz MD     Date: 7/23/2020  Time: 13:11    Cc:  Tiera Valenzuela, APRN

## 2020-07-23 NOTE — ANESTHESIA POSTPROCEDURE EVALUATION
Patient: Jaquan Mckay    Procedure Summary     Date:  07/23/20 Room / Location:  [facility] OR 03 /  [facility] OR    Anesthesia Start:  1142 Anesthesia Stop:  1311    Procedures:       RIGHT TENNIS ELBOW RELEASE (Right Elbow)      LATERAL EPICONDYLAR RELEASE (Right Elbow) Diagnosis:       Lateral epicondylitis of right elbow      (Lateral epicondylitis of right elbow [M77.11])    Surgeon:  Jose Ruiz MD Provider:  Mohit Rojo DO    Anesthesia Type:  general, MAC ASA Status:  3          Anesthesia Type: general, MAC    Vitals  Vitals Value Taken Time   /83 7/23/2020  1:41 PM   Temp 98 °F (36.7 °C) 7/23/2020  1:12 PM   Pulse 87 7/23/2020  1:41 PM   Resp 14 7/23/2020  1:41 PM   SpO2 96 % 7/23/2020  1:41 PM           Post Anesthesia Care and Evaluation    Patient location during evaluation: PHASE II  Patient participation: complete - patient participated  Level of consciousness: awake and alert  Pain score: 1  Pain management: adequate  Airway patency: patent  Anesthetic complications: No anesthetic complications  PONV Status: controlled  Cardiovascular status: acceptable  Respiratory status: acceptable and room air  Hydration status: euvolemic  No anesthesia care post op

## 2020-07-23 NOTE — ED PROVIDER NOTES
"Subjective   49-year-old male presents to the ER complaining of right upper extremity pain.  Patient underwent orthopedic procedure by Dr. Sara hansen and earlier today.  Patient states she is having discomfort from the splint.          Review of Systems   Constitutional: Negative.  Negative for fever.   HENT: Negative.    Respiratory: Negative.    Cardiovascular: Negative.  Negative for chest pain.   Gastrointestinal: Negative.  Negative for abdominal pain.   Endocrine: Negative.    Genitourinary: Negative.  Negative for dysuria.   Musculoskeletal: Positive for arthralgias.   Skin: Negative.    Neurological: Negative.    Psychiatric/Behavioral: Negative.    All other systems reviewed and are negative.      Past Medical History:   Diagnosis Date   • Allergic    • Anxiety    • Arthritis    • Asthma    • Body piercing     REPORTS CYLICONE IN EARS   • Clotting disorder (CMS/[facility]) 2004    had a knee surgery   • Coronary artery disease    • Depression    • DVT (deep venous thrombosis) (CMS/[facility])     RIGHT RIGHT KNEE AFTER SURGERY YEARS AGO IN 2001 OR 2004   • Elevated cholesterol    • Gastric ulcer    • GERD (gastroesophageal reflux disease)    • H/O migraine    • Headache    • Heart attack (CMS/[facility])     REPORTS \"LIGHT HEART ATTACK A LONG TIME AGO\"  \"EARLY 90'S\"   • History of seizures     REPORTS LAST EPISODE WAS AROUND 1995.   • Hostility    • Hyperlipidemia    • Hypertension    • Knee pain, acute     Left   • Low back pain    • Lyme disease    • Migraine    • MRSA (methicillin resistant Staphylococcus aureus)     REPORTS LAST TESTED + 2004. WAS TREATED HE REPORTS.  RIGHT ARM, RIGHT KNEE.   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    • Obesity    • Poor historian    • Carl Mountain spotted fever    • Seizures (CMS/[facility])    • Sleep apnea    • Tattoo    • Wears glasses        Allergies   Allergen Reactions   • Ciprofloxacin Anaphylaxis and Hives   • Mobic [Meloxicam] Other (See Comments)     Pt states, \"It make my feet and hands go numb and I " "can't hardly walk.\"    • Paxil [Paroxetine Hcl] Shortness Of Breath     Chest pain    • Peanut-Containing Drug Products Anaphylaxis   • Penicillins Anaphylaxis   • Pristiq [Desvenlafaxine Succinate Er] Dizziness   • Sulfa Antibiotics Anaphylaxis, Itching and Rash   • Doxycycline Hives   • Fish-Derived Products Hives   • Isosorbide Nitrate Rash     Rash, hives, had to use inhaler.    • Movantik [Naloxegol] Rash   • Clarithromycin Rash   • Clindamycin/Lincomycin Rash   • Contrast Dye Itching and Rash   • Diltiazem Rash   • Gabapentin Rash   • Keflex [Cephalexin] Rash   • Metoprolol Rash   • Prednisone Rash and Other (See Comments)     Face, feet, and legs go completely numb per patient   • Robitussin Cough+ Chest Max St [Dextromethorphan-Guaifenesin] Itching   • Shrimp (Diagnostic) Rash   • Spironolactone Rash   • Viibryd [Vilazodone Hcl] Itching and Rash   • Zoloft [Sertraline Hcl] Hives and Itching       Past Surgical History:   Procedure Laterality Date   • ABDOMINAL SURGERY     • BACK SURGERY     • BRAIN SURGERY  1986    Tumor removal    • CARDIAC CATHETERIZATION N/A 9/28/2018    Procedure: Left Heart Cath;  Surgeon: Leandro Daily MD;  Location:  [facility] CATH INVASIVE LOCATION;  Service: Cardiology   • CHOLECYSTECTOMY     • COLONOSCOPY     • CYST REMOVAL      pilonidal cyst   • ENDOSCOPY     • FRACTURE SURGERY Right     elbow   • KNEE ARTHROSCOPY Left 10/20/2017    Procedure: Diagnostic arthroscopy left knee with chondroplasty;  Surgeon: Marco Aguirre MD;  Location: [facility] OR;  Service:    • KNEE SURGERY Right    • MOUTH SURGERY      FULL MOUTH EXTRACTION   • OTHER SURGICAL HISTORY      REPORTS 7 TICKS REMOVED FROM RIGHT ARM IN 2001 OR 2002   • TUMOR EXCISION      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       Family History   Problem Relation Age of Onset   • Diabetes Mother    • Hypertension Mother    • Stroke Mother    • Diabetes Father    • Skin cancer Father    • Hypertension Father    • Heart attack " Father    • Diabetes Brother    • Hypertension Brother    • Heart disease Maternal Aunt    • Heart disease Maternal Uncle    • Heart disease Paternal Aunt    • Heart disease Paternal Uncle    • Heart disease Maternal Grandmother    • Heart disease Maternal Grandfather    • Heart disease Paternal Grandmother    • Heart disease Paternal Grandfather        Social History     Socioeconomic History   • Marital status:      Spouse name: Becca   • Number of children: 2   • Years of education: 12   • Highest education level: Not on file   Occupational History   • Occupation: DISABLED   Social Needs   • Financial resource strain: Somewhat hard   • Food insecurity:     Worry: Sometimes true     Inability: Sometimes true   • Transportation needs:     Medical: No     Non-medical: No   Tobacco Use   • Smoking status: Current Every Day Smoker     Packs/day: 1.50     Years: 17.00     Pack years: 25.50     Types: Cigars, Cigarettes     Start date: 5/5/2010   • Smokeless tobacco: Never Used   • Tobacco comment: still uses 1.5 packs a day.   Substance and Sexual Activity   • Alcohol use: No   • Drug use: No   • Sexual activity: Defer     Partners: Female     Birth control/protection: None   Lifestyle   • Physical activity:     Days per week: 0 days     Minutes per session: 0 min   • Stress: To some extent   Relationships   • Social connections:     Talks on phone: Once a week     Gets together: Once a week     Attends Sikh service: Never     Active member of club or organization: No     Attends meetings of clubs or organizations: Never     Relationship status:            Objective   Physical Exam   Constitutional: He is oriented to person, place, and time. He appears well-developed and well-nourished. No distress.   HENT:   Head: Normocephalic and atraumatic.   Right Ear: External ear normal.   Left Ear: External ear normal.   Nose: Nose normal.   Eyes: Conjunctivae are normal.   Neck: Normal range of motion.  Neck supple. No JVD present. No tracheal deviation present.   Cardiovascular: Normal rate.   No murmur heard.  Pulmonary/Chest: Effort normal. No respiratory distress. He has no wheezes.   Abdominal: Soft. There is no tenderness.   Musculoskeletal: He exhibits tenderness. He exhibits no edema or deformity.   Patient has good cap refill as well as good radial pulse.  New splint will be applied and patient encouraged to follow-up with Dr. Ruiz has not.   Neurological: He is alert and oriented to person, place, and time. No cranial nerve deficit.   Skin: Skin is warm and dry. No rash noted. He is not diaphoretic. No erythema. No pallor.   Psychiatric: He has a normal mood and affect. His behavior is normal. Thought content normal.   Nursing note and vitals reviewed.      Procedures           ED Course                                           MDM  Number of Diagnoses or Management Options  Right arm pain: new and does not require workup  Risk of Complications, Morbidity, and/or Mortality  Presenting problems: low  Diagnostic procedures: low  Management options: low    Patient Progress  Patient progress: stable      Final diagnoses:   Right arm pain            Rick Mehta II, PA  07/23/20 2004

## 2020-07-23 NOTE — ED NOTES
Falls bracelet placed on pt in triage, pt placed in WC for safety.     Sintia Holm RN  07/23/20 1936

## 2020-07-23 NOTE — ANESTHESIA PREPROCEDURE EVALUATION
Anesthesia Evaluation     Patient summary reviewed and Nursing notes reviewed   no history of anesthetic complications:  NPO Solid Status: > 8 hours  NPO Liquid Status: > 8 hours           Airway   Mallampati: II  TM distance: >3 FB  Neck ROM: full  No difficulty expected  Dental    (+) edentulous    Pulmonary     breath sounds clear to auscultation  (+) a smoker Current Abstained day of surgery, asthma,shortness of breath, sleep apnea,   Cardiovascular     ECG reviewed  Rhythm: regular  Rate: normal    (+) hypertension, past MI  6-12 months, CAD, angina, DVT, hyperlipidemia,       Neuro/Psych  (+) seizures, headaches, numbness, psychiatric history Anxiety and Depression,       ROS Comment: One week ago  GI/Hepatic/Renal/Endo    (+) obesity, morbid obesity, GERD poorly controlled, PUD, GI bleeding ,     Musculoskeletal     (+) back pain,   Abdominal   (+) obese,    Substance History      OB/GYN          Other   arthritis,                      Anesthesia Plan    ASA 3     general     intravenous induction     Anesthetic plan, all risks, benefits, and alternatives have been provided, discussed and informed consent has been obtained with: patient.    Plan discussed with CRNA.

## 2020-07-23 NOTE — TELEPHONE ENCOUNTER
"Deioner states that her son had right elbow surgery today.  His hand is very swollen and painful.  He can barely move his fingers due to the swelling, patient states that he cannot put a finger between the cast and his hand.  Caller states that the swelling is getting worse.    Reason for Disposition  • [1] SEVERE pain of fingers or toes AND [2] not improved after pain medications and elevation    Additional Information  • Negative: Sounds like a life-threatening emergency to the triager  • Negative: Chest pain  • Negative: Difficulty breathing  • Negative: Acting confused (e.g., disoriented, slurred speech) or excessively sleepy  • Negative: Surgical incision symptoms and questions  • Negative: [1] Discomfort (pain, burning or stinging) when passing urine AND [2] male  • Negative: [1] Discomfort (pain, burning or stinging) when passing urine AND [2] female  • Negative: Constipation  • Negative: New or worsening leg (calf, thigh) pain  • Negative: New or worsening leg swelling  • Negative: Dizziness is severe, or persists > 24 hours after surgery  • Negative: Pain, redness, swelling, or pus at IV Site  • Negative: Symptoms arising from use of a urinary catheter (Portillo or Coude)  • Negative: Medication question  • Cast problems or questions  • Negative: Splint or elastic bandage problems or questions  • Negative: Symptoms or questions after surgery  • Negative: Chest pain  • Negative: Difficulty breathing  • Negative: [1] SEVERE pain (e.g., excruciating, pain scale 8-10) under cast AND [2] not improved after pain medications and elevation    Answer Assessment - Initial Assessment Questions  1. SYMPTOM: \"What's the main symptom you're concerned about?\" (e.g., pain, fever, vomiting)      Cast too tight  2. ONSET: \"When did cast to tight   start?\"      tonight  3. SURGERY: \"What surgery was performed?\"      Tennis elbow release  4. DATE of SURGERY: \"When was surgery performed?\"       today  5. ANESTHESIA: \" What type of " "anesthesia did you have?\" (e.g., general, spinal, epidural, local)      Not sure  6. PAIN: \"Is there any pain?\" If so, ask: \"How bad is it?\"  (Scale 1-10; or mild, moderate, severe)      Yes getting severe  7. FEVER: \"Do you have a fever?\" If so, ask: \"What is your temperature, how was it measured, and when did it start?\"      no  8. VOMITING: \"Is there any vomiting?\" If yes, ask: \"How many times?\"      no  9. BLEEDING: \"Is there any bleeding?\" If so, ask: \"How much?\" and \"Where?\"      no  10. OTHER SYMPTOMS: \"Do you have any other symptoms?\" (e.g., drainage from wound, painful urination, constipation)        Hand is really starting to swell    Answer Assessment - Initial Assessment Questions  1. CAST LOCATION: \"Where is the cast?\"      Right arm  2. CAST SYMPTOM: \"What's the main symptom you're concerned about?\" (e.g., numbness, pain, color change)      Too tight, hand very swollen  3. ONSET: \"When did swelling  start?\", \"Is it getting worse?\"      Today, yes  4. WHEN CAST: When was the cast put on?\"       today  5. INJURY: \"What is the underlying injury?\"  (fracture, torn ligament, surgery, etc)       Elbow surgery  6. PAIN: \"Is there any pain?\" If so, ask: \"How bad is it?\"  (Scale 1-10; or mild, moderate, severe)    - MILD - doesn't interfere with normal activities    - MODERATE - interferes with normal activities (e.g., work or school) or awakens from sleep    - SEVERE - excruciating pain, unable to do any normal activities      Mod to severe  7. OTHER SYMPTOMS: \"Do you have any other symptoms?\" (e.g., fever, difficulty breathing)      no    Protocols used: CAST SYMPTOMS AND QUESTIONS-ADULT-AH, POST-OP SYMPTOMS AND QUESTIONS-ADULT-AH      "

## 2020-07-24 DIAGNOSIS — I10 ESSENTIAL HYPERTENSION: ICD-10-CM

## 2020-07-27 RX ORDER — LISINOPRIL 20 MG/1
40 TABLET ORAL DAILY
Qty: 60 TABLET | Refills: 5 | Status: SHIPPED | OUTPATIENT
Start: 2020-07-27 | End: 2020-10-01 | Stop reason: DRUGHIGH

## 2020-07-28 ENCOUNTER — TELEPHONE (OUTPATIENT)
Dept: ORTHOPEDIC SURGERY | Facility: CLINIC | Age: 50
End: 2020-07-28

## 2020-07-28 NOTE — TELEPHONE ENCOUNTER
Patient called stating he had to go to the ED due to swelling and splint being to tight. Patient states they applied a new splint but now his arm is straight, hanging by his side, still have swelling.  Instructed patient to elevate and use ice. Verified patients phone number.

## 2020-07-29 ENCOUNTER — OFFICE VISIT (OUTPATIENT)
Dept: ORTHOPEDIC SURGERY | Facility: CLINIC | Age: 50
End: 2020-07-29

## 2020-07-29 VITALS — HEIGHT: 67 IN | BODY MASS INDEX: 35.64 KG/M2 | TEMPERATURE: 97.8 F | WEIGHT: 227.07 LBS

## 2020-07-29 DIAGNOSIS — Z09 POSTOP CHECK: Primary | ICD-10-CM

## 2020-07-29 PROCEDURE — 99024 POSTOP FOLLOW-UP VISIT: CPT | Performed by: ORTHOPAEDIC SURGERY

## 2020-07-29 PROCEDURE — 29105 APPLICATION LONG ARM SPLINT: CPT | Performed by: ORTHOPAEDIC SURGERY

## 2020-07-29 NOTE — PROGRESS NOTES
Patient: Jaquan Mckay  YOB: 1970  Date of Encounter: 07/29/2020      Chief Complaint:   Chief Complaint   Patient presents with   • Right Elbow - Post-op, Follow-up       07/23/20 (6d)     Jose Ruiz MD      Right Tennis Elbow Release - Right   Lateral Epicondylar Release - Right                HPI:  Jaquan Mckay, 49 y.o. male returns in postoperative follow-up 1 week following lateral release right elbow.  He went to the emergency room following his surgery with swelling in his hand and had his splint removed and reapplied.  He is doing reasonably well now.        Medical History:  Patient Active Problem List   Diagnosis   • Gastroesophageal reflux disease without esophagitis   • Arthritis   • Hypertension   • Hyperlipidemia   • Anxiety   • Varicocele   • Varicocele present on ultrasound of scrotum   • Obesity (BMI 30.0-34.9)   • Chronic bilateral low back pain with left-sided sciatica   • Seasonal allergic rhinitis due to pollen   • Mild persistent asthma without complication   • Precordial pain   • Dysuria   • Congenital coronary artery anomaly   • BMI 35.0-35.9,adult   • Chondromalacia, knee   • Achilles tendinitis of both lower extremities   • Muscle spasm of both lower legs   • Personal history of allergy to shellfish   • Sensation of cold in lower extremity   • Varicose vein of leg   • Heart palpitations   • Shortness of breath   • Generalized anxiety disorder   • Chronic elbow pain, right   • Chest pain   • Unstable angina (CMS/HCC)   • ASCVD (arteriosclerotic cardiovascular disease)   • Elevated prolactin level   • Other constipation   • Class 1 obesity due to excess calories with serious comorbidity and body mass index (BMI) of 33.0 to 33.9 in adult   • Bacteremia   • Therapeutic opioid-induced constipation (OIC)   • Rectal bleeding   • Bloating   • Generalized abdominal pain   • History of colon polyps   • Lateral epicondylitis of right elbow     Past Medical History:  "  Diagnosis Date   • Allergic    • Anxiety    • Arthritis    • Asthma    • Body piercing     REPORTS CYLICONE IN EARS   • Clotting disorder (CMS/HCC) 2004    had a knee surgery   • Coronary artery disease    • Depression    • DVT (deep venous thrombosis) (CMS/HCC)     RIGHT RIGHT KNEE AFTER SURGERY YEARS AGO IN 2001 OR 2004   • Elevated cholesterol    • Gastric ulcer    • GERD (gastroesophageal reflux disease)    • H/O migraine    • Headache    • Heart attack (CMS/HCC)     REPORTS \"LIGHT HEART ATTACK A LONG TIME AGO\"  \"EARLY 90'S\"   • History of seizures     REPORTS LAST EPISODE WAS AROUND 1995.   • Hostility    • Hyperlipidemia    • Hypertension    • Knee pain, acute     Left   • Low back pain    • Lyme disease    • Migraine    • MRSA (methicillin resistant Staphylococcus aureus)     REPORTS LAST TESTED + 2004. WAS TREATED HE REPORTS.  RIGHT ARM, RIGHT KNEE.   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    • Obesity    • Poor historian    • [place] spotted fever    • Seizures (CMS/HCC)    • Sleep apnea    • Tattoo    • Wears glasses            Surgical History:  Past Surgical History:   Procedure Laterality Date   • ABDOMINAL SURGERY     • BACK SURGERY     • BRAIN SURGERY  1986    Tumor removal    • CARDIAC CATHETERIZATION N/A 9/28/2018    Procedure: Left Heart Cath;  Surgeon: Leandro Daily MD;  Location: [facility] CATH INVASIVE LOCATION;  Service: Cardiology   • CHOLECYSTECTOMY     • COLONOSCOPY     • CYST REMOVAL      pilonidal cyst   • ELBOW EPICONDYLECTOMY Right 7/23/2020    Procedure: LATERAL EPICONDYLAR RELEASE;  Surgeon: Jose Ruiz MD;  Location: [facility] OR;  Service: Orthopedics;  Laterality: Right;   • ENDOSCOPY     • FRACTURE SURGERY Right     elbow   • KNEE ARTHROSCOPY Left 10/20/2017    Procedure: Diagnostic arthroscopy left knee with chondroplasty;  Surgeon: Marco Aguirre MD;  Location: [facility] OR;  Service:    • KNEE SURGERY Right    • MOUTH SURGERY      FULL MOUTH EXTRACTION   • OTHER SURGICAL HISTORY   "    REPORTS 7 TICKS REMOVED FROM RIGHT ARM IN 2001 OR 2002   • TENNIS ELBOW RELEASE Right 7/23/2020    Procedure: RIGHT TENNIS ELBOW RELEASE;  Surgeon: Jose Ruiz MD;  Location: [facility];  Service: Orthopedics;  Laterality: Right;   • TUMOR EXCISION      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        Assessment & Plan:  49 y.o. male presents follow-up lateral release right elbow overall doing well today we reapplied his fiberglass non-removable posterior splint at 90 degrees he will follow-up in 2 weeks.       Diagnosis Plan   1. Postop check               Cc:  Tiera Valenzuela APRN              This document has been electronically signed by Jose Ruiz MD   July 29, 2020 10:49

## 2020-08-03 ENCOUNTER — OFFICE VISIT (OUTPATIENT)
Dept: FAMILY MEDICINE CLINIC | Facility: CLINIC | Age: 50
End: 2020-08-03

## 2020-08-03 VITALS
HEIGHT: 67 IN | TEMPERATURE: 97.1 F | SYSTOLIC BLOOD PRESSURE: 150 MMHG | HEART RATE: 70 BPM | WEIGHT: 222 LBS | DIASTOLIC BLOOD PRESSURE: 90 MMHG | OXYGEN SATURATION: 96 % | BODY MASS INDEX: 34.84 KG/M2

## 2020-08-03 DIAGNOSIS — G89.29 CHRONIC BILATERAL LOW BACK PAIN WITH LEFT-SIDED SCIATICA: ICD-10-CM

## 2020-08-03 DIAGNOSIS — Z11.59 NEED FOR HEPATITIS C SCREENING TEST: Primary | ICD-10-CM

## 2020-08-03 DIAGNOSIS — E66.09 CLASS 1 OBESITY DUE TO EXCESS CALORIES WITH SERIOUS COMORBIDITY AND BODY MASS INDEX (BMI) OF 33.0 TO 33.9 IN ADULT: ICD-10-CM

## 2020-08-03 DIAGNOSIS — I10 ESSENTIAL HYPERTENSION: ICD-10-CM

## 2020-08-03 DIAGNOSIS — M94.262 CHONDROMALACIA OF LEFT KNEE: ICD-10-CM

## 2020-08-03 DIAGNOSIS — F41.9 ANXIETY: ICD-10-CM

## 2020-08-03 DIAGNOSIS — E78.2 MIXED HYPERLIPIDEMIA: ICD-10-CM

## 2020-08-03 DIAGNOSIS — M54.42 CHRONIC BILATERAL LOW BACK PAIN WITH LEFT-SIDED SCIATICA: ICD-10-CM

## 2020-08-03 DIAGNOSIS — G40.909 SEIZURE DISORDER (HCC): ICD-10-CM

## 2020-08-03 DIAGNOSIS — E16.2 HYPOGLYCEMIA: ICD-10-CM

## 2020-08-03 DIAGNOSIS — Z13.21 ENCOUNTER FOR VITAMIN DEFICIENCY SCREENING: ICD-10-CM

## 2020-08-03 PROCEDURE — 99214 OFFICE O/P EST MOD 30 MIN: CPT | Performed by: NURSE PRACTITIONER

## 2020-08-03 RX ORDER — HYDROCODONE BITARTRATE AND ACETAMINOPHEN 10; 325 MG/1; MG/1
1 TABLET ORAL 2 TIMES DAILY PRN
Qty: 60 TABLET | Refills: 0 | Status: SHIPPED | OUTPATIENT
Start: 2020-08-03 | End: 2020-09-03 | Stop reason: SDUPTHER

## 2020-08-03 NOTE — PROGRESS NOTES
"Subjective   Jaquan Mckay is a 49 y.o. male.     Chief Complaint   Patient presents with   • Hypertension   • Follow up Elbow Surgery       History of Present Illness     HTN-elevated today.  Patient is acutely in pain.  He has also has not taken his medication today.  He is also acutely in pain due to his recent elbow surgery.  He denies any acute CP or palpitations.   No  Headache.   Elbow Surgery follow up-on July 23.  Reports he had \"tennis elbow\" release.  He did have to go to the ED for splint adjustment after having some hand swelling post op.   He reports his stitches \"came out on the bandages\".  He reports he has been wrapping his arm at home.    GERD-chronic and ongoing.  On Dexilant 60 mg.  No negative side effects of medication.  Patient does avoid foods that trigger reflux symptoms.  Denies any recent exacerbations.    The following portions of the patient's history were reviewed and updated as appropriate: CC, ROS, allergies, current medications, past family history, past medical history, past social history, past surgical history and problem list.      Review of Systems   Constitutional: Negative for appetite change, fatigue and unexpected weight change.   HENT: Negative for congestion, ear pain, nosebleeds, postnasal drip, rhinorrhea, sore throat, trouble swallowing and voice change.    Eyes: Negative for photophobia, pain and visual disturbance.   Respiratory: Negative for cough, chest tightness, shortness of breath and wheezing.    Cardiovascular: Negative for chest pain and palpitations.   Gastrointestinal: Positive for abdominal pain. Negative for blood in stool, constipation, diarrhea and nausea.   Endocrine: Negative for cold intolerance and polydipsia.   Genitourinary: Negative for difficulty urinating, flank pain, frequency and hematuria.   Musculoskeletal: Positive for arthralgias and back pain. Negative for gait problem, joint swelling and myalgias.   Skin: Negative for color change and " "rash.   Allergic/Immunologic: Negative.    Neurological: Positive for dizziness, numbness and headaches. Negative for syncope.   Hematological: Negative.    Psychiatric/Behavioral: Negative for dysphoric mood, sleep disturbance and suicidal ideas. The patient is not nervous/anxious.    All other systems reviewed and are negative.      Objective     /90   Pulse 70   Temp 97.1 °F (36.2 °C) (Temporal)   Ht 170.2 cm (67\")   Wt 101 kg (222 lb)   SpO2 96%   BMI 34.77 kg/m²     Physical Exam   Constitutional: He is oriented to person, place, and time. He appears well-developed and well-nourished. No distress.   HENT:   Head: Normocephalic and atraumatic.   Right Ear: Hearing, tympanic membrane, external ear and ear canal normal.   Left Ear: Hearing, tympanic membrane, external ear and ear canal normal.   Oropharynx not examined.  Patient is presently wearing a face covering/mask due to COVID-19 pandemic.   Eyes: Pupils are equal, round, and reactive to light. Conjunctivae, EOM and lids are normal. No scleral icterus. Right eye exhibits normal extraocular motion and no nystagmus. Left eye exhibits normal extraocular motion and no nystagmus.   Neck: Normal range of motion. Neck supple. No JVD present. No tracheal tenderness present. Carotid bruit is not present. No thyromegaly present.   Cardiovascular: Normal rate, regular rhythm, S1 normal, S2 normal, normal heart sounds and intact distal pulses.   No murmur heard.  Pulmonary/Chest: Effort normal and breath sounds normal. He exhibits no tenderness.   Abdominal: Soft. Bowel sounds are normal. He exhibits no mass. There is no hepatosplenomegaly. There is no tenderness.   Musculoskeletal: He exhibits no edema.        Right elbow: He exhibits decreased range of motion. Tenderness found.        Left knee: Tenderness found. Medial joint line and lateral joint line tenderness noted.        Lumbar back: He exhibits tenderness.   Gait slow and steady.   equal " bilaterally. No muscular atrophy or flaccidity.  Right elbow in splint.  Well-healing incision along medial elbow.  No erythema or edema is noted.   Lymphadenopathy:     He has no cervical adenopathy.        Right cervical: No superficial cervical adenopathy present.       Left cervical: No superficial cervical adenopathy present.   Neurological: He is alert and oriented to person, place, and time. He has normal reflexes. He displays no atrophy and no tremor. No cranial nerve deficit or sensory deficit. He exhibits normal muscle tone. Coordination normal.   Skin: Skin is warm and dry. Capillary refill takes less than 2 seconds. He is not diaphoretic. No cyanosis. No pallor. Nails show no clubbing.   Psychiatric: He has a normal mood and affect. His speech is normal and behavior is normal. Judgment and thought content normal. He is not actively hallucinating. Cognition and memory are normal. He is attentive.   Vitals reviewed.      Assessment/Plan     Problem List Items Addressed This Visit        Cardiovascular and Mediastinum    Hypertension    Relevant Orders    CBC & Differential    Hyperlipidemia    Current Assessment & Plan     LIpid panel ordered per cardiology.  Will await results of that lab.  Continue atorvastatin 40 mg.  Continue to pursue a low-fat low fried food diet.            Digestive    Class 1 obesity due to excess calories with serious comorbidity and body mass index (BMI) of 33.0 to 33.9 in adult       Nervous and Auditory    Chronic bilateral low back pain with left-sided sciatica    Relevant Medications    HYDROcodone-acetaminophen (NORCO)  MG per tablet       Musculoskeletal and Integument    Chondromalacia, knee    Relevant Medications    HYDROcodone-acetaminophen (NORCO)  MG per tablet       Other    Anxiety    Relevant Orders    TSH    T4, Free      Other Visit Diagnoses     Need for hepatitis C screening test    -  Primary    Relevant Orders    Hepatitis C Antibody    Seizure  disorder (CMS/[facility])        Relevant Orders    Phenytoin level, free    Phenytoin level, total    Hypoglycemia        Relevant Orders    Hemoglobin A1c    Encounter for vitamin deficiency screening        Relevant Orders    Vitamin D 25 Hydroxy          Patient's Body mass index is 34.77 kg/m². BMI is above normal parameters. Recommendations include: nutrition counseling.       Understands disease processes and need for medications.  Understands reasons for urgent and emergent care.  Patient (& family) verbalized agreement for treatment plan.   Emotional support and active listening provided.  Patient provided time to verbalize feelings.    [facility]  #[id number] reviewed today and consistent.  Will refill prescribed controlled medication today.  Patient is aware they cannot receive narcotics from any other provider except if under care of pain management or speciality clinic.  Risk and benefits of medication use has been reviewed.  History and physical exam exhibit continued safe and appropriate use of controlled substances.  The patient is aware of the potential for addiction and dependence.  This patient has been made aware of the appropriate use of such medications, including potential risk of somnolence, limited ability to drive and / or work safely, and potential for overdose.    It has also been made clear that these medications are for use by this patient only, without concomitant use of alcohol or other substances unless prescribed/advised by medical provider.  Patient understands they may be subject to UDS and pill counts at random.      Patient considered to be low risk for addiction due to use of single controlled medications.  Patient understands and accepts these risks.  Patient need for medication will be reassessed at each visit.  Doses will be adjusted according to patient need and findings.    Goal of TX: Patient will not have any adverse reactions of medication.  Patient have reduction in pain symptoms  with use of PRN Norco as directed.  Patient will not have any adverse side effects from medication.  Patient will be able to remain active in an outside of home without interference from pain symptoms.    Request to have meds transferred to [facility] due to his limited driving ability and [facility].      Will plan updated fasting labs and Dilatin level.     RTC 1 month, sooner if needed.           This document has been electronically signed by:  BALDOMERO Thao, FNP-C    Dragon disclaimer:  Much of this encounter note is an electronic transcription/translation of spoken language to printed text. The electronic translation of spoken language may permit erroneous, or at times, nonsensical words or phrases to be inadvertently transcribed; Although I have reviewed the note for such errors, some may still exist.

## 2020-08-09 NOTE — ASSESSMENT & PLAN NOTE
LIpid panel ordered per cardiology.  Will await results of that lab.  Continue atorvastatin 40 mg.  Continue to pursue a low-fat low fried food diet.

## 2020-08-10 DIAGNOSIS — Z86.010 HISTORY OF COLON POLYPS: ICD-10-CM

## 2020-08-10 DIAGNOSIS — R07.9 CHEST PAIN, UNSPECIFIED TYPE: ICD-10-CM

## 2020-08-10 DIAGNOSIS — R10.84 GENERALIZED ABDOMINAL PAIN: ICD-10-CM

## 2020-08-10 DIAGNOSIS — K21.9 GASTROESOPHAGEAL REFLUX DISEASE, ESOPHAGITIS PRESENCE NOT SPECIFIED: ICD-10-CM

## 2020-08-10 DIAGNOSIS — T40.2X5A THERAPEUTIC OPIOID-INDUCED CONSTIPATION (OIC): ICD-10-CM

## 2020-08-10 DIAGNOSIS — Z01.818 PREOP TESTING: Primary | ICD-10-CM

## 2020-08-10 DIAGNOSIS — K62.5 RECTAL BLEEDING: ICD-10-CM

## 2020-08-10 DIAGNOSIS — K59.03 THERAPEUTIC OPIOID-INDUCED CONSTIPATION (OIC): ICD-10-CM

## 2020-08-10 DIAGNOSIS — R15.2 FECAL URGENCY: ICD-10-CM

## 2020-08-10 DIAGNOSIS — R14.0 BLOATING: ICD-10-CM

## 2020-08-10 DIAGNOSIS — R19.7 DIARRHEA, UNSPECIFIED TYPE: ICD-10-CM

## 2020-08-10 DIAGNOSIS — K59.04 CHRONIC IDIOPATHIC CONSTIPATION: ICD-10-CM

## 2020-08-12 ENCOUNTER — OFFICE VISIT (OUTPATIENT)
Dept: ORTHOPEDIC SURGERY | Facility: CLINIC | Age: 50
End: 2020-08-12

## 2020-08-12 VITALS — TEMPERATURE: 97.5 F | BODY MASS INDEX: 34.95 KG/M2 | HEIGHT: 67 IN | WEIGHT: 222.66 LBS

## 2020-08-12 DIAGNOSIS — M77.11 LATERAL EPICONDYLITIS, RIGHT ELBOW: Primary | ICD-10-CM

## 2020-08-12 PROCEDURE — 99024 POSTOP FOLLOW-UP VISIT: CPT | Performed by: ORTHOPAEDIC SURGERY

## 2020-08-12 NOTE — PROGRESS NOTES
Follow-up Visit         Patient: Jaquan Mckay  YOB: 1970  Date of Encounter: 08/12/2020      Chief  Complaint:   Chief Complaint   Patient presents with   • Right Elbow - Post-op, Follow-up     Procedure:07/23/2020  RIGHT TENNIS ELBOW RELEASE  LATERAL epicondylectomy.     Surgeon:  Jose Ruiz MD           HPI:  Jaquan Mckay, 49 y.o. male presents in follow-up lateral release right elbow doing well still has some pain he removed his splint and cast as he was having pain.  He is now 3 weeks postop.        Medical History:  Patient Active Problem List   Diagnosis   • Gastroesophageal reflux disease without esophagitis   • Arthritis   • Hypertension   • Hyperlipidemia   • Anxiety   • Varicocele   • Varicocele present on ultrasound of scrotum   • Obesity (BMI 30.0-34.9)   • Chronic bilateral low back pain with left-sided sciatica   • Seasonal allergic rhinitis due to pollen   • Mild persistent asthma without complication   • Precordial pain   • Dysuria   • Congenital coronary artery anomaly   • BMI 35.0-35.9,adult   • Chondromalacia, knee   • Achilles tendinitis of both lower extremities   • Muscle spasm of both lower legs   • Personal history of allergy to shellfish   • Sensation of cold in lower extremity   • Varicose vein of leg   • Heart palpitations   • Shortness of breath   • Generalized anxiety disorder   • Chronic elbow pain, right   • Chest pain   • Unstable angina (CMS/HCC)   • ASCVD (arteriosclerotic cardiovascular disease)   • Elevated prolactin level   • Other constipation   • Class 1 obesity due to excess calories with serious comorbidity and body mass index (BMI) of 33.0 to 33.9 in adult   • Bacteremia   • Therapeutic opioid-induced constipation (OIC)   • Rectal bleeding   • Bloating   • Generalized abdominal pain   • History of colon polyps   • Lateral epicondylitis of right elbow     Past Medical History:   Diagnosis Date   • Allergic    • Anxiety    • Arthritis    •  "Asthma    • Body piercing     REPORTS CYLICONE IN EARS   • Clotting disorder (CMS/HCC) 2004    had a knee surgery   • Coronary artery disease    • Depression    • DVT (deep venous thrombosis) (CMS/HCC)     RIGHT RIGHT KNEE AFTER SURGERY YEARS AGO IN 2001 OR 2004   • Elevated cholesterol    • Gastric ulcer    • GERD (gastroesophageal reflux disease)    • H/O migraine    • Headache    • Heart attack (CMS/HCC)     REPORTS \"LIGHT HEART ATTACK A LONG TIME AGO\"  \"EARLY 90'S\"   • History of seizures     REPORTS LAST EPISODE WAS AROUND 1995.   • Hostility    • Hyperlipidemia    • Hypertension    • Knee pain, acute     Left   • Low back pain    • Lyme disease    • Migraine    • MRSA (methicillin resistant Staphylococcus aureus)     REPORTS LAST TESTED + 2004. WAS TREATED HE REPORTS.  RIGHT ARM, RIGHT KNEE.   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    • Obesity    • Poor historian    • Carl Mountain spotted fever    • Seizures (CMS/HCC)    • Sleep apnea    • Tattoo    • Wears glasses            Social History:  Social History     Socioeconomic History   • Marital status:      Spouse name: Becca   • Number of children: 2   • Years of education: 12   • Highest education level: Not on file   Occupational History   • Occupation: DISABLED   Social Needs   • Financial resource strain: Somewhat hard   • Food insecurity:     Worry: Sometimes true     Inability: Sometimes true   • Transportation needs:     Medical: No     Non-medical: No   Tobacco Use   • Smoking status: Current Every Day Smoker     Packs/day: 1.50     Years: 17.00     Pack years: 25.50     Types: Cigars, Cigarettes     Start date: 5/5/2010   • Smokeless tobacco: Never Used   • Tobacco comment: still uses 1.5 packs a day.   Substance and Sexual Activity   • Alcohol use: No   • Drug use: No   • Sexual activity: Defer     Partners: Female     Birth control/protection: None   Lifestyle   • Physical activity:     Days per week: 0 days     Minutes per session: 0 min   • Stress: To " some extent   Relationships   • Social connections:     Talks on phone: Once a week     Gets together: Once a week     Attends Christian service: Never     Active member of club or organization: No     Attends meetings of clubs or organizations: Never     Relationship status:            Surgical History:  Past Surgical History:   Procedure Laterality Date   • ABDOMINAL SURGERY     • BACK SURGERY     • BRAIN SURGERY  1986    Tumor removal    • CARDIAC CATHETERIZATION N/A 9/28/2018    Procedure: Left Heart Cath;  Surgeon: Leandro Daily MD;  Location: [facility] CATH INVASIVE LOCATION;  Service: Cardiology   • CHOLECYSTECTOMY     • COLONOSCOPY     • CYST REMOVAL      pilonidal cyst   • ELBOW EPICONDYLECTOMY Right 7/23/2020    Procedure: LATERAL EPICONDYLAR RELEASE;  Surgeon: Jose Ruiz MD;  Location: [facility] OR;  Service: Orthopedics;  Laterality: Right;   • ENDOSCOPY     • FRACTURE SURGERY Right     elbow   • KNEE ARTHROSCOPY Left 10/20/2017    Procedure: Diagnostic arthroscopy left knee with chondroplasty;  Surgeon: Marco Aguirre MD;  Location: [facility] OR;  Service:    • KNEE SURGERY Right    • MOUTH SURGERY      FULL MOUTH EXTRACTION   • OTHER SURGICAL HISTORY      REPORTS 7 TICKS REMOVED FROM RIGHT ARM IN 2001 OR 2002   • TENNIS ELBOW RELEASE Right 7/23/2020    Procedure: RIGHT TENNIS ELBOW RELEASE;  Surgeon: Jose Ruiz MD;  Location: [facility];  Service: Orthopedics;  Laterality: Right;   • TUMOR EXCISION      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 Anatomic alignment of the third digit. 2. Tiny chip fracture from the volar side of the third digit at the base of the middle phalanx. Signer Name: Marco Mondragon MD  Signed: 7/15/2020 1:09 AM  Workstation Name: [facility]  [facility]    Xr Finger 2+ View Right    Result Date: 7/14/2020  Right 3rd PIP joint dislocation. Signer  Name: Isidro Marti MD  Signed: 7/14/2020 10:20 PM  Workstation Name: [id number]  [facility] [place]          Examination:   Examination right elbow reveals incision to be intact minimal localized swelling mild tenderness he demonstrates near full extension flexes to 100 degrees neurovascular grossly intact.        Assessment & Plan:   49 y.o. male presents 3 weeks following lateral release right elbow he is anxious to resume his activity and is requesting that he attend physical therapy we will allow gentle range of motion exercises and progression of strengthening with a follow-up in 6 weeks.  He is cautioned about strenuous activity.         Diagnosis Plan   1. Lateral epicondylitis, right elbow  Ambulatory Referral to Physical Therapy POST OP (Lateral release right elbow), Evaluate and treat; ROM (Gentle ROM with progressive strengthening), Strengthening           Cc:  Tiera Valenzuela APRN              This document has been electronically signed by Jose Ruiz MD   August 13, 2020 18:30

## 2020-08-21 ENCOUNTER — HOSPITAL ENCOUNTER (EMERGENCY)
Facility: HOSPITAL | Age: 50
Discharge: HOME | End: 2020-08-22
Attending: EMERGENCY MEDICINE | Admitting: EMERGENCY MEDICINE

## 2020-08-21 DIAGNOSIS — R55 SYNCOPE, UNSPECIFIED SYNCOPE TYPE: Primary | ICD-10-CM

## 2020-08-21 LAB
BASOPHILS # BLD AUTO: 0.02 10*3/MM3 (ref 0–0.2)
BASOPHILS NFR BLD AUTO: 0.2 % (ref 0–1.5)
DEPRECATED RDW RBC AUTO: 43.7 FL (ref 37–54)
EOSINOPHIL # BLD AUTO: 0.05 10*3/MM3 (ref 0–0.4)
EOSINOPHIL NFR BLD AUTO: 0.5 % (ref 0.3–6.2)
ERYTHROCYTE [DISTWIDTH] IN BLOOD BY AUTOMATED COUNT: 12.5 % (ref 12.3–15.4)
HCT VFR BLD AUTO: 41.8 % (ref 37.5–51)
HGB BLD-MCNC: 14.5 G/DL (ref 13–17.7)
IMM GRANULOCYTES # BLD AUTO: 0.01 10*3/MM3 (ref 0–0.05)
IMM GRANULOCYTES NFR BLD AUTO: 0.1 % (ref 0–0.5)
LYMPHOCYTES # BLD AUTO: 1.76 10*3/MM3 (ref 0.7–3.1)
LYMPHOCYTES NFR BLD AUTO: 17 % (ref 19.6–45.3)
MCH RBC QN AUTO: 32.5 PG (ref 26.6–33)
MCHC RBC AUTO-ENTMCNC: 34.7 G/DL (ref 31.5–35.7)
MCV RBC AUTO: 93.7 FL (ref 79–97)
MONOCYTES # BLD AUTO: 0.98 10*3/MM3 (ref 0.1–0.9)
MONOCYTES NFR BLD AUTO: 9.5 % (ref 5–12)
NEUTROPHILS NFR BLD AUTO: 7.51 10*3/MM3 (ref 1.7–7)
NEUTROPHILS NFR BLD AUTO: 72.7 % (ref 42.7–76)
NRBC BLD AUTO-RTO: 0 /100 WBC (ref 0–0.2)
PLATELET # BLD AUTO: 167 10*3/MM3 (ref 140–450)
PMV BLD AUTO: 9.5 FL (ref 6–12)
RBC # BLD AUTO: 4.46 10*6/MM3 (ref 4.14–5.8)
WBC # BLD AUTO: 10.33 10*3/MM3 (ref 3.4–10.8)

## 2020-08-21 PROCEDURE — 93010 ELECTROCARDIOGRAM REPORT: CPT | Performed by: INTERNAL MEDICINE

## 2020-08-21 PROCEDURE — 36415 COLL VENOUS BLD VENIPUNCTURE: CPT

## 2020-08-21 PROCEDURE — 84484 ASSAY OF TROPONIN QUANT: CPT | Performed by: EMERGENCY MEDICINE

## 2020-08-21 PROCEDURE — 93005 ELECTROCARDIOGRAM TRACING: CPT | Performed by: EMERGENCY MEDICINE

## 2020-08-21 PROCEDURE — 85025 COMPLETE CBC W/AUTO DIFF WBC: CPT | Performed by: EMERGENCY MEDICINE

## 2020-08-21 PROCEDURE — 83735 ASSAY OF MAGNESIUM: CPT | Performed by: EMERGENCY MEDICINE

## 2020-08-21 PROCEDURE — 99284 EMERGENCY DEPT VISIT MOD MDM: CPT

## 2020-08-21 PROCEDURE — 80053 COMPREHEN METABOLIC PANEL: CPT | Performed by: EMERGENCY MEDICINE

## 2020-08-22 ENCOUNTER — APPOINTMENT (OUTPATIENT)
Dept: GENERAL RADIOLOGY | Facility: HOSPITAL | Age: 50
End: 2020-08-22

## 2020-08-22 VITALS
OXYGEN SATURATION: 96 % | DIASTOLIC BLOOD PRESSURE: 85 MMHG | HEIGHT: 67 IN | RESPIRATION RATE: 18 BRPM | SYSTOLIC BLOOD PRESSURE: 123 MMHG | TEMPERATURE: 98.3 F | BODY MASS INDEX: 35.79 KG/M2 | WEIGHT: 228 LBS | HEART RATE: 98 BPM

## 2020-08-22 LAB
ALBUMIN SERPL-MCNC: 4.3 G/DL (ref 3.5–5.2)
ALBUMIN/GLOB SERPL: 1.5 G/DL
ALP SERPL-CCNC: 83 U/L (ref 39–117)
ALT SERPL W P-5'-P-CCNC: 22 U/L (ref 1–41)
ANION GAP SERPL CALCULATED.3IONS-SCNC: 11.7 MMOL/L (ref 5–15)
AST SERPL-CCNC: 20 U/L (ref 1–40)
BILIRUB SERPL-MCNC: 0.3 MG/DL (ref 0–1.2)
BUN SERPL-MCNC: 15 MG/DL (ref 6–20)
BUN/CREAT SERPL: 17 (ref 7–25)
CALCIUM SPEC-SCNC: 8.8 MG/DL (ref 8.6–10.5)
CHLORIDE SERPL-SCNC: 102 MMOL/L (ref 98–107)
CO2 SERPL-SCNC: 24.3 MMOL/L (ref 22–29)
CREAT SERPL-MCNC: 0.88 MG/DL (ref 0.76–1.27)
GFR SERPL CREATININE-BSD FRML MDRD: 93 ML/MIN/1.73
GLOBULIN UR ELPH-MCNC: 2.9 GM/DL
GLUCOSE SERPL-MCNC: 115 MG/DL (ref 65–99)
MAGNESIUM SERPL-MCNC: 1.6 MG/DL (ref 1.6–2.6)
POTASSIUM SERPL-SCNC: 3.8 MMOL/L (ref 3.5–5.2)
PROT SERPL-MCNC: 7.2 G/DL (ref 6–8.5)
SODIUM SERPL-SCNC: 138 MMOL/L (ref 136–145)
TROPONIN T SERPL-MCNC: <0.01 NG/ML (ref 0–0.03)

## 2020-08-22 PROCEDURE — 73080 X-RAY EXAM OF ELBOW: CPT

## 2020-08-22 PROCEDURE — 72072 X-RAY EXAM THORAC SPINE 3VWS: CPT

## 2020-08-22 NOTE — ED PROVIDER NOTES
"Subjective   Patient reports getting lightheaded at home onto his back injuring his right elbow.  He states he called his brother who had to come get him because he had trouble getting up due to generalized weakness.  He states he had some weakness in both legs before he fell but denied any preceding headache, abdominal pain, chest pain, nausea vomiting or diarrhea.  States he bumped his head but denies loss of consciousness or current headaches.          Review of Systems   Constitutional: Negative for chills and fever.   HENT: Negative for sinus pain and sore throat.    Eyes: Negative for visual disturbance.   Respiratory: Negative for chest tightness and shortness of breath.    Cardiovascular: Negative for chest pain.   Gastrointestinal: Negative for abdominal pain, constipation, diarrhea, nausea and vomiting.   Genitourinary: Negative for dysuria, flank pain, hematuria and urgency.   Musculoskeletal: Negative for myalgias.   Skin: Negative for rash.   Neurological: Negative for syncope, numbness and headaches.   Psychiatric/Behavioral: Negative for confusion.       Past Medical History:   Diagnosis Date   • Allergic    • Anxiety    • Arthritis    • Asthma    • Body piercing     REPORTS CYLICONE IN EARS   • Clotting disorder (CMS/[facility]) 2004    had a knee surgery   • Coronary artery disease    • Depression    • DVT (deep venous thrombosis) (CMS/[facility])     RIGHT RIGHT KNEE AFTER SURGERY YEARS AGO IN 2001 OR 2004   • Elevated cholesterol    • Gastric ulcer    • GERD (gastroesophageal reflux disease)    • H/O migraine    • Headache    • Heart attack (CMS/[facility])     REPORTS \"LIGHT HEART ATTACK A LONG TIME AGO\"  \"EARLY 90'S\"   • History of seizures     REPORTS LAST EPISODE WAS AROUND 1995.   • Hostility    • Hyperlipidemia    • Hypertension    • Knee pain, acute     Left   • Low back pain    • Lyme disease    • Migraine    • MRSA (methicillin resistant Staphylococcus aureus)     REPORTS LAST TESTED + 2004. WAS TREATED HE " "REPORTS.  RIGHT ARM, RIGHT KNEE.   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    • Obesity    • Poor historian    • Carl Mountain spotted fever    • Seizures (CMS/HCC)    • Sleep apnea    • Tattoo    • Wears glasses        Allergies   Allergen Reactions   • Ciprofloxacin Anaphylaxis and Hives   • Mobic [Meloxicam] Other (See Comments)     Pt states, \"It make my feet and hands go numb and I can't hardly walk.\"    • Paxil [Paroxetine Hcl] Shortness Of Breath     Chest pain    • Peanut-Containing Drug Products Anaphylaxis   • Penicillins Anaphylaxis   • Pristiq [Desvenlafaxine Succinate Er] Dizziness   • Sulfa Antibiotics Anaphylaxis, Itching and Rash   • Doxycycline Hives   • Fish-Derived Products Hives   • Isosorbide Nitrate Rash     Rash, hives, had to use inhaler.    • Movantik [Naloxegol] Rash   • Clarithromycin Rash   • Clindamycin/Lincomycin Rash   • Contrast Dye Itching and Rash   • Diltiazem Rash   • Gabapentin Rash   • Keflex [Cephalexin] Rash   • Metoprolol Rash   • Prednisone Rash and Other (See Comments)     Face, feet, and legs go completely numb per patient   • Robitussin Cough+ Chest Max St [Dextromethorphan-Guaifenesin] Itching   • Shrimp (Diagnostic) Rash   • Spironolactone Rash   • Viibryd [Vilazodone Hcl] Itching and Rash   • Zoloft [Sertraline Hcl] Hives and Itching       Past Surgical History:   Procedure Laterality Date   • ABDOMINAL SURGERY     • BACK SURGERY     • BRAIN SURGERY  1986    Tumor removal    • CARDIAC CATHETERIZATION N/A 9/28/2018    Procedure: Left Heart Cath;  Surgeon: Leandro Daily MD;  [facility];  Service: Cardiology   • CHOLECYSTECTOMY     • COLONOSCOPY     • CYST REMOVAL      pilonidal cyst   • ELBOW EPICONDYLECTOMY Right 7/23/2020    Procedure: LATERAL EPICONDYLAR RELEASE;  Surgeon: Jose Ruiz MD;  [facility];  Service: Orthopedics;  Laterality: Right;   • ENDOSCOPY     • FRACTURE SURGERY Right     elbow   • KNEE ARTHROSCOPY Left 10/20/2017    " Procedure: Diagnostic arthroscopy left knee with chondroplasty;  Surgeon: Marco Aguirre MD;  Location: [facility];  Service:    • KNEE SURGERY Right    • MOUTH SURGERY      FULL MOUTH EXTRACTION   • OTHER SURGICAL HISTORY      REPORTS 7 TICKS REMOVED FROM RIGHT ARM IN 2001 OR 2002   • TENNIS ELBOW RELEASE Right 7/23/2020    Procedure: RIGHT TENNIS ELBOW RELEASE;  Surgeon: Jose Ruiz MD;  Location: [facility];  Service: Orthopedics;  Laterality: Right;   • TUMOR EXCISION      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       Family History   Problem Relation Age of Onset   • Diabetes Mother    • Hypertension Mother    • Stroke Mother    • Diabetes Father    • Skin cancer Father    • Hypertension Father    • Heart attack Father    • Diabetes Brother    • Hypertension Brother    • Heart disease Maternal Aunt    • Heart disease Maternal Uncle    • Heart disease Paternal Aunt    • Heart disease Paternal Uncle    • Heart disease Maternal Grandmother    • Heart disease Maternal Grandfather    • Heart disease Paternal Grandmother    • Heart disease Paternal Grandfather        Social History     Socioeconomic History   • Marital status:      Spouse name: Becca   • Number of children: 2   • Years of education: 12   • Highest education level: Not on file   Occupational History   • Occupation: DISABLED   Social Needs   • Financial resource strain: Somewhat hard   • Food insecurity:     Worry: Sometimes true     Inability: Sometimes true   • Transportation needs:     Medical: No     Non-medical: No   Tobacco Use   • Smoking status: Current Every Day Smoker     Packs/day: 1.50     Years: 17.00     Pack years: 25.50     Types: Cigars, Cigarettes     Start date: 5/5/2010   • Smokeless tobacco: Never Used   • Tobacco comment: still uses 1.5 packs a day.   Substance and Sexual Activity   • Alcohol use: No   • Drug use: No   • Sexual activity: Defer     Partners: Female     Birth control/protection: None    Lifestyle   • Physical activity:     Days per week: 0 days     Minutes per session: 0 min   • Stress: To some extent   Relationships   • Social connections:     Talks on phone: Once a week     Gets together: Once a week     Attends [employer] service: Never     Active member of club or organization: No     Attends meetings of clubs or organizations: Never     Relationship status:            Objective   Physical Exam   Constitutional: He is oriented to person, place, and time. He appears well-developed and well-nourished.   HENT:   Head: Normocephalic and atraumatic.   Nose: Nose normal.   Mouth/Throat: No oropharyngeal exudate.   Eyes: Pupils are equal, round, and reactive to light. Conjunctivae and EOM are normal.   Neck: Normal range of motion. Neck supple. No JVD present. No tracheal deviation present.   Cardiovascular: Normal rate, regular rhythm, normal heart sounds and intact distal pulses. Exam reveals no gallop and no friction rub.   No murmur heard.  Pulmonary/Chest: Effort normal and breath sounds normal. No stridor. No respiratory distress. He has no wheezes. He has no rales.   Abdominal: Soft. Bowel sounds are normal. He exhibits no distension and no mass. There is no tenderness. There is no rebound and no guarding. No hernia.   Musculoskeletal: He exhibits no edema, tenderness or deformity.   Lymphadenopathy:     He has no cervical adenopathy.   Neurological: He is alert and oriented to person, place, and time. No cranial nerve deficit or sensory deficit. He exhibits normal muscle tone.   Skin: Skin is warm and dry. Capillary refill takes less than 2 seconds.   Psychiatric: He has a normal mood and affect. His behavior is normal. Judgment and thought content normal.       Procedures           ED Course                                           MDM    Final diagnoses:   Syncope, unspecified syncope type            Jamin Parekh DO  08/22/20 0124

## 2020-08-27 ENCOUNTER — TREATMENT (OUTPATIENT)
Dept: PHYSICAL THERAPY | Facility: CLINIC | Age: 50
End: 2020-08-27

## 2020-08-27 DIAGNOSIS — M25.621 DECREASED ROM OF RIGHT ELBOW: ICD-10-CM

## 2020-08-27 DIAGNOSIS — M25.521 RIGHT ELBOW PAIN: Primary | ICD-10-CM

## 2020-08-27 PROCEDURE — 97162 PT EVAL MOD COMPLEX 30 MIN: CPT | Performed by: PHYSICAL THERAPIST

## 2020-08-27 NOTE — PROGRESS NOTES
Physical Therapy Initial Evaluation and Plan of Care        Patient: Jaquan Mckay   : 1970  Diagnosis/ICD-10 Code:  Right elbow pain [M25.521]  Referring practitioner: Jose Ruiz, *  Date of Initial Visit: 2020  Today's Date: 2020  Patient seen for 1 sessions    Visit Diagnoses:    ICD-10-CM ICD-9-CM   1. Right elbow pain M25.521 719.42   2. Decreased ROM of right elbow M25.621 719.52            Subjective Questionnaire:       Subjective Evaluation    History of Present Illness  Date of surgery: 2020  Mechanism of injury: Pt has suffered from right elbow pain for multiple years.  The patient was referred to MD Ruiz and received surgery for lateral epicondyle release on 2020.  The patient recently was taken out of his elbow cast and was referred to therapy for improved ROM and elbow stability.    Quality of life: good    Pain  Current pain ratin  At best pain ratin  At worst pain rating: 10  Location: right elbow  Quality: sharp  Relieving factors: medications, rest, change in position and ice  Aggravating factors: keyboarding, lifting, movement, overhead activity, prolonged positioning, sleeping, outstretched reach and repetitive movement  Progression: no change    Patient Goals  Patient goals for therapy: decreased edema, decreased pain, increased motion, increased strength, independence with ADLs/IADLs, return to sport/leisure activities and return to work             Objective          Observations     Additional Elbow Observation Details  Right elbow incision demonstrated good healing yet edema noted under incision today    Active Range of Motion     Right Shoulder   Flexion: 130 degrees     Right Elbow   Flexion: 80 degrees   Forearm supination: 66 degrees   Forearm pronation: 90 degrees     Right Wrist   Wrist flexion: 58 degrees   Wrist extension: 55 degrees     Additional Active Range of Motion Details  Right elbow lacks 10 degrees from full extension      Strength/Myotome Testing     Left Wrist/Hand      (2nd hand position)     Trial 1: 80 lbs    Trial 2: 85 lbs    Trial 3: 83 lbs    Average: 82.67 lbs    Right Wrist/Hand      (2nd hand position)     Trial 1: 25 lbs    Trial 2: 31 lbs    Trial 3: 28 lbs    Average: 28 lbs          Assessment & Plan     Assessment  Impairments: abnormal muscle firing, abnormal or restricted ROM, activity intolerance, impaired physical strength, lacks appropriate home exercise program, pain with function and safety issue  Assessment details: Pt is a 48 y/o male referred to therapy for rehab following a right lateral epicondyle realease.  Pt presents with decreased wrist and elbow ROM, decreased strength and increased pain.  Therapy will follow as indicated by MD.   Prognosis: good  Functional Limitations: carrying objects, lifting, pulling, pushing, uncomfortable because of pain, reaching overhead and unable to perform repetitive tasks  Goals  Plan Goals: STG 4 weeks    1 Pt will be instructed in a HEP.  2 Pt will report pain no greater than 5/10 with ADLs.  3 Pt will improve right  strength to 40 pounds of pressure.    LTG 8 weeks    1 Pt will improve right  strength to 60 pounds to open grocery packages.  2 Pt will improve right wrist ROM within 10 degrees of left for flexion and extension.  3 Pt will report pain no greater than 3/10 with moderate lifting.    Plan  Therapy options: will be seen for skilled physical therapy services  Planned modality interventions: cryotherapy, electrical stimulation/Russian stimulation, TENS, thermotherapy (hydrocollator packs), thermotherapy (paraffin bath) and ultrasound  Planned therapy interventions: ADL retraining, balance/weight-bearing training, flexibility, functional ROM exercises, home exercise program, IADL retraining, joint mobilization, manual therapy, neuromuscular re-education, soft tissue mobilization, strengthening and stretching  Duration in visits: 20  Duration  in weeks: 8  Treatment plan discussed with: patient  Plan details: Will follow for optimal gains    Moderate Evaluation  32679  Re-evaluation   11974    Therapeutic exercise  62643  Therapeutic activity    12995  Neuromuscular re-education   23813  Manual therapy   01820  Unattended e-stim (Medicaid/Medicare)     Moist heat/cryotherapy 17169   Ultrasound   41188            Visit Diagnoses:    ICD-10-CM ICD-9-CM   1. Right elbow pain M25.521 719.42   2. Decreased ROM of right elbow M25.621 719.52       Timed:  Manual Therapy:         mins  61933;  Therapeutic Exercise:         mins  18195;     Neuromuscular Jazmin:        mins  49217;    Therapeutic Activity:          mins  41405;     Gait Training:           mins  94371;     Ultrasound:          mins  69000;    Electrical Stimulation:         mins  04877 ( );    Untimed:  Electrical Stimulation:         mins  70572 ( );  Mechanical Traction:         mins  06627;     Timed Treatment:      mins   Total Treatment:     40   mins    PT SIGNATURE: Win Rodriguez, GABRIELE   DATE TREATMENT INITIATED: 8/27/2020    Initial Certification  Certification Period: 11/25/2020  I certify that the therapy services are furnished while this patient is under my care.  The services outlined above are required by this patient, and will be reviewed every 90 days.     PHYSICIAN: Jose Ruiz MD      DATE:     Please sign and return via fax to [phone].. Thank you, [facility].

## 2020-08-31 ENCOUNTER — TREATMENT (OUTPATIENT)
Dept: PHYSICAL THERAPY | Facility: CLINIC | Age: 50
End: 2020-08-31

## 2020-08-31 DIAGNOSIS — M25.621 DECREASED ROM OF RIGHT ELBOW: ICD-10-CM

## 2020-08-31 DIAGNOSIS — M25.521 RIGHT ELBOW PAIN: Primary | ICD-10-CM

## 2020-08-31 PROCEDURE — 97110 THERAPEUTIC EXERCISES: CPT | Performed by: PHYSICAL THERAPIST

## 2020-08-31 PROCEDURE — 97035 APP MDLTY 1+ULTRASOUND EA 15: CPT | Performed by: PHYSICAL THERAPIST

## 2020-08-31 NOTE — PROGRESS NOTES
Patient: Jaquan Mckay   : 1970  Referring practitioner: Jose Ruiz, *  Date of Initial Visit: Type: THERAPY  Noted: 2020  Today's Date: 2020  Patient seen for 2 sessions           Subjective Evaluation    History of Present Illness    Subjective comment: Pt reports having 9/10 pain today.       Objective   See Exercise, Manual, and Modality Logs for complete treatment.       Assessment & Plan     Assessment  Assessment details: Tx today consisted of mh to right elbow followed by there ex for improved mobility and function and ended with US and ice for decreased pain and edema.  Pt responded well to treatment today with reports of 8/10 pain post tx.    Plan  Plan details: Will follow progressing with conservative approach.        Visit Diagnoses:    ICD-10-CM ICD-9-CM   1. Right elbow pain M25.521 719.42   2. Decreased ROM of right elbow M25.621 719.52       Progress strengthening /stabilization /functional activity           Timed:  Manual Therapy:         mins  90735;  Therapeutic Exercise:    20     mins  66787;     Neuromuscular Jazmin:        mins  56506;    Therapeutic Activity:          mins  61374;     Gait Training:           mins  59608;     Ultrasound:     8     mins  32001;    Electrical Stimulation:         mins  96218 ( );    Untimed:  Electrical Stimulation:         mins  26140 ( );  Mechanical Traction:         mins  58679;     Timed Treatment:   28   mins   Total Treatment:     38   mins  Win Rodriguez, PT  Physical Therapist

## 2020-09-02 ENCOUNTER — TREATMENT (OUTPATIENT)
Dept: PHYSICAL THERAPY | Facility: CLINIC | Age: 50
End: 2020-09-02

## 2020-09-02 DIAGNOSIS — M25.521 RIGHT ELBOW PAIN: Primary | ICD-10-CM

## 2020-09-02 DIAGNOSIS — M25.621 DECREASED ROM OF RIGHT ELBOW: ICD-10-CM

## 2020-09-02 PROCEDURE — 97035 APP MDLTY 1+ULTRASOUND EA 15: CPT | Performed by: PHYSICAL THERAPIST

## 2020-09-02 PROCEDURE — 97110 THERAPEUTIC EXERCISES: CPT | Performed by: PHYSICAL THERAPIST

## 2020-09-02 NOTE — PROGRESS NOTES
Physical Therapy Daily Progress Note      Patient: Jaquan Mckay   : 1970  Referring practitioner: Jose Ruiz, *  Date of Initial Visit: Type: THERAPY  Noted: 2020  Today's Date: 2020  Patient seen for 3 sessions         Jaquan Mckay reports that he is having 8/10 pain in his right elbow. Patient states that he is having numbness and tingling in his right arm and into his thumb, pointer, middle finger. Patient reports that he is worried about where his incision is in his right elbow because it is swelling and feels like a bone. Patient states that he has a f/u appt with his family doctor on 9/3/2020.    Objective   See Exercise, Manual, and Modality Logs for complete treatment.       Assessment/Plan   Hanna Burks, PT, DPT educated and assessed patient's right elbow. PT states that he is just having swelling in the incision area and to continue with treatment session. No redness or warmth noted in the area, incision was healing good. Educated patient on the sign and symptoms of blood clot and infection, patient verbalized understanding. Patient educated on if signs or symptoms of blood clot or infection occur to receive medical attention immediately, patient verbalized understanding. Patient states he is going to f/u with his family doctor tomorrow. Patient tolerated treatment session well with rest breaks taken as needed by the patient. Treatment session kept the same secondary to patient's reports of increased pain in the right elbow. Educated patient to perform therex per his tolerance, patient verbalized understanding. No adverse reactions with modalities or treatment session. Decrease in pain noted following treatment session. Continue per PT's POC, progress per the patient's tolerance.    Visit Diagnoses:    ICD-10-CM ICD-9-CM   1. Right elbow pain M25.521 719.42   2. Decreased ROM of right elbow M25.621 719.52       Progress per Plan of Care           Timed:  Manual  Therapy:         mins  53884;  Therapeutic Exercise:    21     mins  88330;     Neuromuscular Jazmin:        mins  29517;    Therapeutic Activity:          mins  46882;     Gait Training:           mins  94605;     Ultrasound:     8     mins  34796;    Electrical Stimulation:         mins  39044 ( );    Untimed:   Electrical Stimulation:         mins  03010 ( );  Mechanical Traction:         mins  70071;     Timed Treatment:  29    mins   Total Treatment:     39   mins  Radha Martin PTA

## 2020-09-03 ENCOUNTER — OFFICE VISIT (OUTPATIENT)
Dept: FAMILY MEDICINE CLINIC | Facility: CLINIC | Age: 50
End: 2020-09-03

## 2020-09-03 VITALS
BODY MASS INDEX: 35.79 KG/M2 | DIASTOLIC BLOOD PRESSURE: 82 MMHG | HEIGHT: 67 IN | OXYGEN SATURATION: 97 % | WEIGHT: 228 LBS | SYSTOLIC BLOOD PRESSURE: 122 MMHG | TEMPERATURE: 97.8 F | HEART RATE: 84 BPM

## 2020-09-03 DIAGNOSIS — M54.42 CHRONIC BILATERAL LOW BACK PAIN WITH LEFT-SIDED SCIATICA: ICD-10-CM

## 2020-09-03 DIAGNOSIS — M94.262 CHONDROMALACIA OF LEFT KNEE: ICD-10-CM

## 2020-09-03 DIAGNOSIS — M25.521 RIGHT ELBOW PAIN: Primary | ICD-10-CM

## 2020-09-03 DIAGNOSIS — F51.01 PRIMARY INSOMNIA: ICD-10-CM

## 2020-09-03 DIAGNOSIS — K59.09 CHRONIC CONSTIPATION: ICD-10-CM

## 2020-09-03 DIAGNOSIS — G89.29 CHRONIC BILATERAL LOW BACK PAIN WITH LEFT-SIDED SCIATICA: ICD-10-CM

## 2020-09-03 DIAGNOSIS — M77.11 RIGHT LATERAL EPICONDYLITIS: ICD-10-CM

## 2020-09-03 PROCEDURE — 99214 OFFICE O/P EST MOD 30 MIN: CPT | Performed by: NURSE PRACTITIONER

## 2020-09-03 RX ORDER — ALBUTEROL SULFATE 90 UG/1
2 AEROSOL, METERED RESPIRATORY (INHALATION) EVERY 4 HOURS PRN
Qty: 18 G | Refills: 5 | Status: SHIPPED | OUTPATIENT
Start: 2020-09-03 | End: 2021-02-08 | Stop reason: SDUPTHER

## 2020-09-03 RX ORDER — HYDROCODONE BITARTRATE AND ACETAMINOPHEN 10; 325 MG/1; MG/1
1 TABLET ORAL 2 TIMES DAILY PRN
Qty: 60 TABLET | Refills: 0 | Status: SHIPPED | OUTPATIENT
Start: 2020-09-03 | End: 2020-10-01

## 2020-09-03 RX ORDER — MIRTAZAPINE 30 MG/1
15-30 TABLET, FILM COATED ORAL NIGHTLY
Qty: 60 TABLET | Refills: 5 | Status: SHIPPED | OUTPATIENT
Start: 2020-09-03 | End: 2021-02-08 | Stop reason: SDUPTHER

## 2020-09-03 NOTE — PROGRESS NOTES
"Subjective   Jaquan Mckay is a 49 y.o. male.     Chief Complaint   Patient presents with   • Elbow Pain   • Heartburn       History of Present Illness     Right Elbow pain-chronic and ongoing.  Continues to be present.  Patient is under the care of orthopedic.  He had therapy yesterday.  He reports he had a \"knot at his elbow\"  And had numbness in his first 3 digits intermittently.  He has an appt with ortho on \"the 23rd\".  He has had a fall since his last visit.    Back and joint pain-chronic and ongoing.  Patient is presently on hydrocodone 10 mg up to twice daily PRN.  No negative side effects.  He reports that the medication allows him to be active.  It helps to decrease his multiple areas of joint pain.  It allows him to be able to exercise for weight loss.  Chronic constipation-under the care of GI.  He has been prescribed lactulose.  He has failed multiple other medications including MiraLAX, docusate, Movantik.  He reports that he has had diarrhea for about a week with some LLQ pain.  Cramping in sensation.  He has not had any Melena.  He is under care of GI.  He has been advised to have a colonoscopy.    Seizure disorder-chronic and ongoing.  Patient is due for phenytoin level.  He continues to take his Dilantin as directed.  He has not had any seizure activity.  No negative side effects of medication.    The following portions of the patient's history were reviewed and updated as appropriate: CC, ROS, allergies, current medications, past family history, past medical history, past social history, past surgical history and problem list.      Review of Systems   Constitutional: Negative for appetite change, fatigue and unexpected weight change.   HENT: Negative for congestion, ear pain, nosebleeds, postnasal drip, rhinorrhea, sore throat, trouble swallowing and voice change.    Eyes: Negative for photophobia, pain and visual disturbance.   Respiratory: Negative for cough, chest tightness, shortness of breath " "and wheezing.    Cardiovascular: Negative for chest pain and palpitations.   Gastrointestinal: Positive for abdominal pain. Negative for blood in stool, constipation, diarrhea and nausea.   Endocrine: Negative for cold intolerance and polydipsia.   Genitourinary: Negative for difficulty urinating, flank pain, frequency and hematuria.   Musculoskeletal: Positive for arthralgias and back pain. Negative for gait problem, joint swelling and myalgias.   Skin: Negative for color change and rash.   Allergic/Immunologic: Negative.    Neurological: Positive for dizziness, numbness and headaches. Negative for syncope.   Hematological: Negative.    Psychiatric/Behavioral: Negative for dysphoric mood, sleep disturbance and suicidal ideas. The patient is not nervous/anxious.    All other systems reviewed and are negative.      Objective     /82   Pulse 84   Temp 97.8 °F (36.6 °C) (Temporal)   Ht 170.2 cm (67\")   Wt 103 kg (228 lb)   SpO2 97%   BMI 35.71 kg/m²     Physical Exam   Constitutional: He is oriented to person, place, and time. He appears well-developed and well-nourished. No distress.   HENT:   Head: Normocephalic and atraumatic.   Right Ear: Hearing, tympanic membrane, external ear and ear canal normal.   Left Ear: Hearing, tympanic membrane, external ear and ear canal normal.   Oropharynx not examined.  Patient is presently wearing a face covering/mask due to COVID-19 pandemic.   Eyes: Pupils are equal, round, and reactive to light. Conjunctivae, EOM and lids are normal. No scleral icterus. Right eye exhibits normal extraocular motion and no nystagmus. Left eye exhibits normal extraocular motion and no nystagmus.   Neck: Normal range of motion. Neck supple. No JVD present. No tracheal tenderness present. Carotid bruit is not present. No thyromegaly present.   Cardiovascular: Normal rate, regular rhythm, S1 normal, S2 normal, normal heart sounds and intact distal pulses.   No murmur " heard.  Pulmonary/Chest: Effort normal and breath sounds normal. He exhibits no tenderness.   Abdominal: Soft. Bowel sounds are normal. He exhibits no mass. There is no hepatosplenomegaly. There is no tenderness.   Musculoskeletal: He exhibits no edema.        Lumbar back: He exhibits decreased range of motion, tenderness, pain and spasm.        Left forearm: He exhibits tenderness, bony tenderness, swelling (mild) and deformity.   Gait antalgic.   equal bilaterally. No muscular atrophy or flaccidity.   Lymphadenopathy:     He has no cervical adenopathy.        Right cervical: No superficial cervical adenopathy present.       Left cervical: No superficial cervical adenopathy present.   Neurological: He is alert and oriented to person, place, and time. He has normal reflexes. He displays no atrophy and no tremor. No cranial nerve deficit or sensory deficit. He exhibits normal muscle tone. Coordination normal.   Skin: Skin is warm and dry. Capillary refill takes less than 2 seconds. He is not diaphoretic. No cyanosis. No pallor. Nails show no clubbing.   Psychiatric: He has a normal mood and affect. His speech is normal and behavior is normal. Judgment and thought content normal. He is not actively hallucinating. Cognition and memory are normal. He is attentive.   Vitals reviewed.      Assessment/Plan     Problem List Items Addressed This Visit        Nervous and Auditory    Chronic bilateral low back pain with left-sided sciatica    Relevant Medications    HYDROcodone-acetaminophen (NORCO)  MG per tablet       Musculoskeletal and Integument    Chondromalacia, knee    Relevant Medications    HYDROcodone-acetaminophen (NORCO)  MG per tablet      Other Visit Diagnoses     Right elbow pain    -  Primary    Relevant Orders    XR elbow 2 vw right    Right lateral epicondylitis        Relevant Orders    XR elbow 2 vw right    Chronic constipation        Relevant Orders    XR abdomen 3 vw    Primary insomnia         Relevant Medications    mirtazapine (REMERON) 30 MG tablet          Patient's Body mass index is 35.71 kg/m². BMI is above normal parameters. Recommendations include: nutrition counseling.       Understands disease processes and need for medications.  Understands reasons for urgent and emergent care.  Patient (& family) verbalized agreement for treatment plan.   Emotional support and active listening provided.  Patient provided time to verbalize feelings.    [facility] #[id number] reviewed today and consistent.  Will refill prescribed controlled medication today.  Patient is aware they cannot receive narcotics from any other provider except if under care of pain management or speciality clinic.  Risk and benefits of medication use has been reviewed.  History and physical exam exhibit continued safe and appropriate use of controlled substances.  The patient is aware of the potential for addiction and dependence.  This patient has been made aware of the appropriate use of such medications, including potential risk of somnolence, limited ability to drive and / or work safely, and potential for overdose.  Patient understands not to take medication and drive until they know how medication may affect their cognition/decision making.   It has also been made clear that these medications are for use by this patient only, without concomitant use of alcohol or other substances unless prescribed/advised by medical provider.  Patient understands they may be subject to UDS and pill counts at random.    Patient considered to be low risk for addiction due to use of single controlled medications.  Patient understands and accepts these risks.  Patient need for medication will be reassessed at each visit.  Doses will be adjusted according to patient need and findings.    Goal of TX: Patient will not have any adverse reactions of medication.  Patient have reduction in pain symptoms with use of PRN Norco as directed.  Patient will not  have any adverse side effects from medication.  Patient will be able to remain active in an outside of home without interference from pain symptoms.    Continue under care of orthopedic for elbow.     Imaging ordered for elbow evaluation due to fall and abdominal series due to complaints of abdominal pain.  Patient to obtain fasting labs at a later date at [facility] per his request.    RTC 1 month, sooner if needed for problems or concerns          This document has been electronically signed by:  BALDOMERO Thao, FNP-C    Dragon disclaimer:  Much of this encounter note is an electronic transcription/translation of spoken language to printed text. The electronic translation of spoken language may permit erroneous, or at times, nonsensical words or phrases to be inadvertently transcribed; Although I have reviewed the note for such errors, some may still exist.

## 2020-09-04 ENCOUNTER — HOSPITAL ENCOUNTER (OUTPATIENT)
Dept: GENERAL RADIOLOGY | Facility: HOSPITAL | Age: 50
Discharge: HOME | End: 2020-09-04
Admitting: NURSE PRACTITIONER

## 2020-09-04 ENCOUNTER — HOSPITAL ENCOUNTER (OUTPATIENT)
Dept: GENERAL RADIOLOGY | Facility: HOSPITAL | Age: 50
Discharge: HOME | End: 2020-09-04

## 2020-09-04 ENCOUNTER — LAB (OUTPATIENT)
Dept: LAB | Facility: HOSPITAL | Age: 50
End: 2020-09-04

## 2020-09-04 ENCOUNTER — TREATMENT (OUTPATIENT)
Dept: PHYSICAL THERAPY | Facility: CLINIC | Age: 50
End: 2020-09-04

## 2020-09-04 DIAGNOSIS — M25.521 RIGHT ELBOW PAIN: Primary | ICD-10-CM

## 2020-09-04 DIAGNOSIS — M25.621 DECREASED ROM OF RIGHT ELBOW: ICD-10-CM

## 2020-09-04 LAB
25(OH)D3 SERPL-MCNC: 45 NG/ML (ref 30–100)
BASOPHILS # BLD AUTO: 0.02 10*3/MM3 (ref 0–0.2)
BASOPHILS NFR BLD AUTO: 0.4 % (ref 0–1.5)
DEPRECATED RDW RBC AUTO: 40.6 FL (ref 37–54)
EOSINOPHIL # BLD AUTO: 0.08 10*3/MM3 (ref 0–0.4)
EOSINOPHIL NFR BLD AUTO: 1.6 % (ref 0.3–6.2)
ERYTHROCYTE [DISTWIDTH] IN BLOOD BY AUTOMATED COUNT: 12.3 % (ref 12.3–15.4)
HBA1C MFR BLD: 4.7 % (ref 4.8–5.6)
HCT VFR BLD AUTO: 43.3 % (ref 37.5–51)
HCV AB SER DONR QL: NORMAL
HGB BLD-MCNC: 15.5 G/DL (ref 13–17.7)
IMM GRANULOCYTES # BLD AUTO: 0.01 10*3/MM3 (ref 0–0.05)
IMM GRANULOCYTES NFR BLD AUTO: 0.2 % (ref 0–0.5)
LYMPHOCYTES # BLD AUTO: 1.23 10*3/MM3 (ref 0.7–3.1)
LYMPHOCYTES NFR BLD AUTO: 23.9 % (ref 19.6–45.3)
MCH RBC QN AUTO: 32.6 PG (ref 26.6–33)
MCHC RBC AUTO-ENTMCNC: 35.8 G/DL (ref 31.5–35.7)
MCV RBC AUTO: 91.2 FL (ref 79–97)
MONOCYTES # BLD AUTO: 0.7 10*3/MM3 (ref 0.1–0.9)
MONOCYTES NFR BLD AUTO: 13.6 % (ref 5–12)
NEUTROPHILS NFR BLD AUTO: 3.1 10*3/MM3 (ref 1.7–7)
NEUTROPHILS NFR BLD AUTO: 60.3 % (ref 42.7–76)
NRBC BLD AUTO-RTO: 0 /100 WBC (ref 0–0.2)
PHENYTOIN SERPL-MCNC: 14.2 MCG/ML (ref 10–20)
PLATELET # BLD AUTO: 189 10*3/MM3 (ref 140–450)
PMV BLD AUTO: 12.5 FL (ref 6–12)
RBC # BLD AUTO: 4.75 10*6/MM3 (ref 4.14–5.8)
T4 FREE SERPL-MCNC: 1.11 NG/DL (ref 0.93–1.7)
TSH SERPL DL<=0.05 MIU/L-ACNC: 2.49 UIU/ML (ref 0.27–4.2)
WBC # BLD AUTO: 5.14 10*3/MM3 (ref 3.4–10.8)

## 2020-09-04 PROCEDURE — 73080 X-RAY EXAM OF ELBOW: CPT | Performed by: RADIOLOGY

## 2020-09-04 PROCEDURE — 80186 ASSAY OF PHENYTOIN FREE: CPT | Performed by: NURSE PRACTITIONER

## 2020-09-04 PROCEDURE — 84443 ASSAY THYROID STIM HORMONE: CPT | Performed by: NURSE PRACTITIONER

## 2020-09-04 PROCEDURE — 97035 APP MDLTY 1+ULTRASOUND EA 15: CPT | Performed by: PHYSICAL THERAPIST

## 2020-09-04 PROCEDURE — 97110 THERAPEUTIC EXERCISES: CPT | Performed by: PHYSICAL THERAPIST

## 2020-09-04 PROCEDURE — 82306 VITAMIN D 25 HYDROXY: CPT | Performed by: NURSE PRACTITIONER

## 2020-09-04 PROCEDURE — 74022 RADEX COMPL AQT ABD SERIES: CPT | Performed by: RADIOLOGY

## 2020-09-04 PROCEDURE — 86803 HEPATITIS C AB TEST: CPT | Performed by: NURSE PRACTITIONER

## 2020-09-04 PROCEDURE — 36415 COLL VENOUS BLD VENIPUNCTURE: CPT | Performed by: NURSE PRACTITIONER

## 2020-09-04 PROCEDURE — 73080 X-RAY EXAM OF ELBOW: CPT

## 2020-09-04 PROCEDURE — 83036 HEMOGLOBIN GLYCOSYLATED A1C: CPT | Performed by: NURSE PRACTITIONER

## 2020-09-04 PROCEDURE — 74022 RADEX COMPL AQT ABD SERIES: CPT

## 2020-09-04 PROCEDURE — 85025 COMPLETE CBC W/AUTO DIFF WBC: CPT | Performed by: NURSE PRACTITIONER

## 2020-09-04 PROCEDURE — 80185 ASSAY OF PHENYTOIN TOTAL: CPT | Performed by: NURSE PRACTITIONER

## 2020-09-04 PROCEDURE — 84439 ASSAY OF FREE THYROXINE: CPT | Performed by: NURSE PRACTITIONER

## 2020-09-04 NOTE — PROGRESS NOTES
Physical Therapy Daily Progress Note      Patient: Jaquan Mckay   : 1970  Referring practitioner: Jose Ruiz, *  Date of Initial Visit: Type: THERAPY  Noted: 2020  Today's Date: 2020  Patient seen for 4 sessions         Jaquan Mckay reports that he is having 9/10 pain in his right elbow. Patient states that he went to the doctor due to a check up and the they did x-rays of his right elbow but he didn't know why. Patient reports that his doctor checked him for a blood clot with blood work and he didn't have a blood clot. Patient states that he is still worried about having a blood clot. Patient reports of having spasms in his right arm at times. Patient states that he is numbness and tingling in his fingers still.      Objective   See Exercise, Manual, and Modality Logs for complete treatment.       Assessment/Plan   Win Rodriguez, PT educated and assessed patient's right elbow. PT states that he is just having slight swelling in the incision area and to continue with treatment session.  No redness or warmth noted in the area, incision was healing good. PT reviewed lab work which was normal. Educated patient on the sign and symptoms of blood clot and infection, patient verbalized understanding. Patient educated on if signs or symptoms of blood clot or infection occur to receive medical attention immediately, patient verbalized understanding. PT assessed chart in regards to x-ray of the right elbow prior to treatment session, x-ray showed no abnormalities, session continued. Patient tolerated treatment session well with rest breaks taken as needed by the patient. New therex added per the patient's tolerance, patient demonstrated and understood new therex with no increase in pain noted. Educated patient to perform therex per his tolerance, patient verbalized understanding. No adverse reactions with modalities or treatment session. Decrease in pain noted following treatment session.  Continue per PT's POC, progress per the patient's tolerance.    Visit Diagnoses:    ICD-10-CM ICD-9-CM   1. Right elbow pain M25.521 719.42   2. Decreased ROM of right elbow M25.621 719.52       Progress per Plan of Care           Timed:  Manual Therapy:         mins  07805;  Therapeutic Exercise:    30     mins  74602;     Neuromuscular Jazmin:        mins  60121;    Therapeutic Activity:          mins  27826;     Gait Training:           mins  65631;     Ultrasound:     8     mins  94996;    Electrical Stimulation:         mins  72929 ( );    Untimed:  Electrical Stimulation:         mins  89647 ( );  Mechanical Traction:         mins  04472;     Timed Treatment:   38   mins   Total Treatment:     44   mins  Radha Martin, PTA

## 2020-09-08 ENCOUNTER — TREATMENT (OUTPATIENT)
Dept: PHYSICAL THERAPY | Facility: CLINIC | Age: 50
End: 2020-09-08

## 2020-09-08 DIAGNOSIS — M25.621 DECREASED ROM OF RIGHT ELBOW: ICD-10-CM

## 2020-09-08 DIAGNOSIS — M25.521 RIGHT ELBOW PAIN: Primary | ICD-10-CM

## 2020-09-08 LAB — PHENYTOIN FREE SERPL-MCNC: 0.9 UG/ML (ref 1–2)

## 2020-09-08 PROCEDURE — 97035 APP MDLTY 1+ULTRASOUND EA 15: CPT | Performed by: PHYSICAL THERAPIST

## 2020-09-08 PROCEDURE — 97110 THERAPEUTIC EXERCISES: CPT | Performed by: PHYSICAL THERAPIST

## 2020-09-08 NOTE — PROGRESS NOTES
Patient: Jaquan Mckay   : 1970  Referring practitioner: Jose Ruiz, *  Date of Initial Visit: Type: THERAPY  Noted: 2020  Today's Date: 2020  Patient seen for 5 sessions           Subjective Evaluation    History of Present Illness    Subjective comment: Pt reports having 7/10 pain today.       Objective   See Exercise, Manual, and Modality Logs for complete treatment.       Assessment & Plan     Assessment  Assessment details: Tx today consisted of there ex progressed with conservative resistance and ended with US for decreased edema.  Pt responded well to added there ex and weight today and reported a 2/10 decrease in pain post tx.      Plan  Plan details: Will follow progressing as patient tolerates.        Visit Diagnoses:    ICD-10-CM ICD-9-CM   1. Right elbow pain M25.521 719.42   2. Decreased ROM of right elbow M25.621 719.52       Progress strengthening /stabilization /functional activity           Timed:  Manual Therapy:         mins  83378;  Therapeutic Exercise:    33     mins  69352;     Neuromuscular Jazmin:        mins  32223;    Therapeutic Activity:          mins  70593;     Gait Training:           mins  42153;     Ultrasound:     8     mins  97766;    Electrical Stimulation:         mins  63418 ( );    Untimed:  Electrical Stimulation:         mins  06125 ( );  Mechanical Traction:         mins  42487;     Timed Treatment:   41   mins   Total Treatment:     41   mins  Win Rodriguez, PT  Physical Therapist

## 2020-09-09 ENCOUNTER — TREATMENT (OUTPATIENT)
Dept: PHYSICAL THERAPY | Facility: CLINIC | Age: 50
End: 2020-09-09

## 2020-09-09 DIAGNOSIS — M25.521 RIGHT ELBOW PAIN: Primary | ICD-10-CM

## 2020-09-09 DIAGNOSIS — M25.621 DECREASED ROM OF RIGHT ELBOW: ICD-10-CM

## 2020-09-09 PROCEDURE — 97035 APP MDLTY 1+ULTRASOUND EA 15: CPT | Performed by: PHYSICAL THERAPIST

## 2020-09-09 PROCEDURE — 97110 THERAPEUTIC EXERCISES: CPT | Performed by: PHYSICAL THERAPIST

## 2020-09-09 NOTE — PROGRESS NOTES
Physical Therapy Daily Progress Note      Patient: Jaquan Mckay   : 1970  Referring practitioner: Jose Ruiz, *  Date of Initial Visit: Type: THERAPY  Noted: 2020  Today's Date: 2020  Patient seen for 6 sessions         Jaquan Mckay reports that he is having 7/10 pain in his elbow. Patient states that he is continuing to have numbness and tingling in the left elbow and hand. Patient reports that last treatment session went good. Patient reports that certain things bother his elbow but he isn't sure what bothers it. Patient states that he stays in pain in his left elbow and it doesn't ease up.       Objective   See Exercise, Manual, and Modality Logs for complete treatment.       Assessment/Plan   Patient tolerated treatment session well with rest breaks taken as needed by the patient. New therex added per the patient's tolerance, patient demonstrated and understood new therex with no increase in pain noted. Educated patient to perform therex per his tolerance, patient verbalized understanding. Verbal cues needed on proper technique of exercises. Patient demonstrated difficulty when performing coin flip and stacking. Decrease in pain noted following treatment session, 5/10 pain in the left elbow. Continue per PT's POC, progress per the patient's tolerance.    Visit Diagnoses:    ICD-10-CM ICD-9-CM   1. Right elbow pain M25.521 719.42   2. Decreased ROM of right elbow M25.621 719.52       Progress strengthening /stabilization /functional activity           Timed:  Manual Therapy:         mins  62226;  Therapeutic Exercise:    37     mins  12764;     Neuromuscular Jazmin:        mins  25453;    Therapeutic Activity:          mins  00810;     Gait Training:           mins  07711;     Ultrasound:     8     mins  65805;    Electrical Stimulation:         mins  79645 ( );    Untimed:  Electrical Stimulation:         mins  33011 ( );  Mechanical Traction:         mins  29452;      Timed Treatment:  45    mins   Total Treatment:     51   mins  Radha Martin, PTA

## 2020-09-09 NOTE — PROGRESS NOTES
Pt made aware of abdominal and elbow xray results.  Discussed provider recommendations.  Pt verbalizes understanding.  No questions at this time.

## 2020-09-11 ENCOUNTER — TREATMENT (OUTPATIENT)
Dept: PHYSICAL THERAPY | Facility: CLINIC | Age: 50
End: 2020-09-11

## 2020-09-11 DIAGNOSIS — M25.621 DECREASED ROM OF RIGHT ELBOW: ICD-10-CM

## 2020-09-11 DIAGNOSIS — M25.521 RIGHT ELBOW PAIN: Primary | ICD-10-CM

## 2020-09-11 PROCEDURE — 97035 APP MDLTY 1+ULTRASOUND EA 15: CPT | Performed by: PHYSICAL THERAPIST

## 2020-09-11 PROCEDURE — 97110 THERAPEUTIC EXERCISES: CPT | Performed by: PHYSICAL THERAPIST

## 2020-09-11 NOTE — PROGRESS NOTES
Patient: Jaquan Mckay   : 1970  Referring practitioner: Jose Ruiz, *  Date of Initial Visit: Type: THERAPY  Noted: 2020  Today's Date: 2020  Patient seen for 7 sessions           Subjective Evaluation    History of Present Illness    Subjective comment: Pt reports having 8/10 pain today.       Objective   See Exercise, Manual, and Modality Logs for complete treatment.       Assessment & Plan     Assessment  Assessment details: Right  strength today 45#. Tx consisted of manual stretching followed by there ex for improved wrist and elbow stability and ended with US to right triceps.  Pt responded well to added resistance with elbow arom today and was issued thera putty for home use.  Pt reported similar post pain scores today at 7/10.    Plan  Plan details: Will follow progressing elbow stability and decreased pain.        Visit Diagnoses:    ICD-10-CM ICD-9-CM   1. Right elbow pain M25.521 719.42   2. Decreased ROM of right elbow M25.621 719.52       Progress strengthening /stabilization /functional activity           Timed:  Manual Therapy:         mins  29695;  Therapeutic Exercise:    34     mins  08561;     Neuromuscular Jazmin:        mins  79269;    Therapeutic Activity:          mins  01057;     Gait Training:           mins  86946;     Ultrasound:     8     mins  78827;    Electrical Stimulation:         mins  98698 ( );    Untimed:  Electrical Stimulation:         mins  01889 ( );  Mechanical Traction:         mins  73876;     Timed Treatment:   42   mins   Total Treatment:     42   mins  Win Rodriguez, PT  Physical Therapist

## 2020-09-14 ENCOUNTER — TREATMENT (OUTPATIENT)
Dept: PHYSICAL THERAPY | Facility: CLINIC | Age: 50
End: 2020-09-14

## 2020-09-14 DIAGNOSIS — M25.521 RIGHT ELBOW PAIN: Primary | ICD-10-CM

## 2020-09-14 DIAGNOSIS — M25.621 DECREASED ROM OF RIGHT ELBOW: ICD-10-CM

## 2020-09-14 PROCEDURE — 97110 THERAPEUTIC EXERCISES: CPT | Performed by: PHYSICAL THERAPIST

## 2020-09-14 PROCEDURE — 97035 APP MDLTY 1+ULTRASOUND EA 15: CPT | Performed by: PHYSICAL THERAPIST

## 2020-09-14 NOTE — PROGRESS NOTES
Patient: Jaquan Mckay   : 1970  Referring practitioner: Jose Ruiz, *  Date of Initial Visit: Type: THERAPY  Noted: 2020  Today's Date: 2020  Patient seen for 8 sessions           Subjective Evaluation    History of Present Illness    Subjective comment: Pt reports having 7/10 pain today.       Objective   See Exercise, Manual, and Modality Logs for complete treatment.       Assessment & Plan     Assessment  Assessment details: Tx today consisted of mh to elbow followed by manual stretching, there ex progressed with increased intensity or reps and ended with US and ice to elbow.  Pt reported discomfort with kick backs today and was noted to tolerated added reps with other exercises well.  Pt reported 7/10 post pain.    Plan  Plan details: Will follow progressing elbow and wrist stability.        Visit Diagnoses:    ICD-10-CM ICD-9-CM   1. Right elbow pain  M25.521 719.42   2. Decreased ROM of right elbow  M25.621 719.52       Progress strengthening /stabilization /functional activity           Timed:  Manual Therapy:         mins  96920;  Therapeutic Exercise:    33     mins  66381;     Neuromuscular Jazmin:        mins  63697;    Therapeutic Activity:          mins  82644;     Gait Training:           mins  55846;     Ultrasound:     8     mins  19562;    Electrical Stimulation:         mins  51264 ( );    Untimed:  Electrical Stimulation:         mins  67233 ( );  Mechanical Traction:         mins  99574;     Timed Treatment:   41   mins   Total Treatment:     53   Mins(6min heat and 6 min ice)  Win Rodriguez, PT  Physical Therapist

## 2020-09-16 ENCOUNTER — TREATMENT (OUTPATIENT)
Dept: PHYSICAL THERAPY | Facility: CLINIC | Age: 50
End: 2020-09-16

## 2020-09-16 DIAGNOSIS — M25.621 DECREASED ROM OF RIGHT ELBOW: ICD-10-CM

## 2020-09-16 DIAGNOSIS — M25.521 RIGHT ELBOW PAIN: Primary | ICD-10-CM

## 2020-09-16 PROCEDURE — 97110 THERAPEUTIC EXERCISES: CPT | Performed by: PHYSICAL THERAPIST

## 2020-09-16 PROCEDURE — 97035 APP MDLTY 1+ULTRASOUND EA 15: CPT | Performed by: PHYSICAL THERAPIST

## 2020-09-16 NOTE — PROGRESS NOTES
Progress Notes      Patient: Jaquan Mckay   : 1970  Diagnosis/ICD-10 Code:  Right elbow pain [M25.521]  Referring practitioner: Jose Ruiz, *  Date of Initial Visit: Type: THERAPY  Noted: 2020  Today's Date: 2020  Patient seen for 9 sessions    Visit Diagnoses:    ICD-10-CM ICD-9-CM   1. Right elbow pain  M25.521 719.42   2. Decreased ROM of right elbow  M25.621 719.52       Subjective:   Jaquan Mckay reports:   Subjective Questionnaire:   Clinical Progress: improved  Home Program Compliance: Yes  Treatment has included: therapeutic exercise, neuromuscular re-education, manual therapy, therapeutic activity, electrical stimulation, ultrasound, moist heat and cryotherapy    Subjective Evaluation    History of Present Illness    Subjective comment: Pt report having increased pain today at 9/10.  Pt reports having similar impairments with mobility and function.Pain  Current pain ratin  At best pain ratin  At worst pain rating: 10         Objective          Active Range of Motion     Right Elbow   Flexion: 105 degrees   Extension: 0 degrees   Forearm supination: 80 degrees   Forearm pronation: 90 degrees     Right Wrist   Wrist flexion: 70 degrees   Wrist extension: 70 degrees     Strength/Myotome Testing     Right Wrist/Hand     Thumb Strength   Key/Lateral Pinch     Trial 1: 25 lbs    Trial 2: 32 lbs    Trial 3: 40 lbs    Average: 32.33 lbs      Assessment & Plan     Assessment  Impairments: abnormal muscle firing, abnormal or restricted ROM, activity intolerance, impaired physical strength, lacks appropriate home exercise program, pain with function and safety issue  Assessment details: Pt is a 50 y/o male referred to therapy for rehab following a right lateral epicondyle release. Pt has demonstrated good attendance with noted improved elbow and wrist ROM, improved functional mobility and improved functional . Pt does cont to report pain that ranges between 8-10/10 at rest and  during activities.  Therapy will follow for improved functional mobility and improved ADLs.  Prognosis: good  Functional Limitations: carrying objects, lifting, pulling, pushing, uncomfortable because of pain, reaching overhead and unable to perform repetitive tasks  Goals  Plan Goals: STG 4 weeks    1 Pt will be instructed in a HEP.met  2 Pt will report pain no greater than 5/10 with ADLs.not met  3 Pt will improve right  strength to 40 pounds of pressure.progressing    LTG 8 weeks    1 Pt will improve right  strength to 60 pounds to open grocery packages.not met  2 Pt will improve right wrist ROM within 10 degrees of left for flexion and extension.progressing  3 Pt will report pain no greater than 3/10 with moderate lifting.not met    Plan  Therapy options: will be seen for skilled physical therapy services  Planned modality interventions: cryotherapy, electrical stimulation/Russian stimulation, TENS, thermotherapy (hydrocollator packs), thermotherapy (paraffin bath) and ultrasound  Planned therapy interventions: ADL retraining, balance/weight-bearing training, flexibility, functional ROM exercises, home exercise program, IADL retraining, joint mobilization, manual therapy, neuromuscular re-education, soft tissue mobilization, strengthening and stretching  Duration in visits: 12  Duration in weeks: 4  Treatment plan discussed with: patient  Plan details: Will follow for optimal gains    Moderate Evaluation  08661  Re-evaluation   18424    Therapeutic exercise  34700  Therapeutic activity    65669  Neuromuscular re-education   24379  Manual therapy   41035  Unattended e-stim (Medicaid/Medicare)     Moist heat/cryotherapy 26097   Ultrasound   80867            Visit Diagnoses:    ICD-10-CM ICD-9-CM   1. Right elbow pain  M25.521 719.42   2. Decreased ROM of right elbow  M25.621 719.52       Progress toward previous goals: Partially Met    New Goals  Short-term goals (STG): 2/3  Long-term goals (LTG):  0/3      Recommendations: Continue as planned  Timeframe: 1 month  Prognosis to achieve goals: good    PT Signature: Win Rodriguez PT      Based upon review of the patient's progress and continued therapy plan, it is my medical opinion that Jaquan Mckay should continue physical therapy treatment at [facility]  [address]  [address]  [place] [address] [phone].    Signature: __________________________________  Jose Ruiz MD    Timed:  Manual Therapy:         mins  67489;  Therapeutic Exercise:    31     mins  29492;     Neuromuscular Jazmin:        mins  80070;    Therapeutic Activity:          mins  95839;     Gait Training:           mins  34580;     Ultrasound:     8     mins  05086;    Electrical Stimulation:         mins  47830 ( );    Untimed:  Electrical Stimulation:         mins  97703 ( );  Mechanical Traction:         mins  65503;     Timed Treatment:   39   mins   Total Treatment:     45   Mins(6min )

## 2020-09-18 ENCOUNTER — TREATMENT (OUTPATIENT)
Dept: PHYSICAL THERAPY | Facility: CLINIC | Age: 50
End: 2020-09-18

## 2020-09-18 DIAGNOSIS — M25.521 RIGHT ELBOW PAIN: Primary | ICD-10-CM

## 2020-09-18 DIAGNOSIS — M25.621 DECREASED ROM OF RIGHT ELBOW: ICD-10-CM

## 2020-09-18 PROCEDURE — 97035 APP MDLTY 1+ULTRASOUND EA 15: CPT | Performed by: PHYSICAL THERAPIST

## 2020-09-18 PROCEDURE — 97110 THERAPEUTIC EXERCISES: CPT | Performed by: PHYSICAL THERAPIST

## 2020-09-18 NOTE — PROGRESS NOTES
"   Physical Therapy Daily Progress Note      Patient: Jaquan Mckay   : 1970  Referring practitioner: Jose Ruiz, *  Date of Initial Visit: Type: THERAPY  Noted: 2020  Today's Date: 2020  Patient seen for 10 sessions         Subjective Evaluation    History of Present Illness    Subjective comment: Patient reports that he has 8/10 pain today; he requests to not perform \"kickback\" exercise, noting that it \"killed him.\" Patient reports that he is very stressed, noting that he will likely have to start living in his car.Pain  Current pain ratin (pre and post tx)           Objective   See Exercise, Manual, and Modality Logs for complete treatment.       Assessment & Plan     Assessment  Assessment details: Therapy session consisted of MH, there ex, and pulsed US.  No adverse reactions were noted with modalities.  There ex continued to focus on ROM, gentle stretching, and gentle strengthening.  Patient noted no change in pain at conclusion of therapy.  Education was provided to patient on community resources, providing information if he were to become homeless.  Patient will continue to be progressed per his tolerance and POC.        Visit Diagnoses:    ICD-10-CM ICD-9-CM   1. Right elbow pain  M25.521 719.42   2. Decreased ROM of right elbow  M25.621 719.52       Progress per Plan of Care and Progress strengthening /stabilization /functional activity           Timed:  Manual Therapy:          mins  52215;  Therapeutic Exercise:    32     mins  03344;     Neuromuscular Jazmin:        mins  58397;    Therapeutic Activity:          mins  33351;     Gait Training:           mins  23095;     Ultrasound:     8     mins  58484;    Electrical Stimulation:         mins  96643 ( );    Untimed:  Electrical Stimulation:         mins  40533 ( );  Mechanical Traction:         mins  64172;     Timed Treatment:   40   mins   Total Treatment:     45   mins  Hanna Burks, PT  Physical " Therapist

## 2020-09-21 ENCOUNTER — TREATMENT (OUTPATIENT)
Dept: PHYSICAL THERAPY | Facility: CLINIC | Age: 50
End: 2020-09-21

## 2020-09-21 DIAGNOSIS — M25.521 RIGHT ELBOW PAIN: Primary | ICD-10-CM

## 2020-09-21 DIAGNOSIS — M25.621 DECREASED ROM OF RIGHT ELBOW: ICD-10-CM

## 2020-09-21 PROCEDURE — 97110 THERAPEUTIC EXERCISES: CPT | Performed by: PHYSICAL THERAPIST

## 2020-09-21 PROCEDURE — 97035 APP MDLTY 1+ULTRASOUND EA 15: CPT | Performed by: PHYSICAL THERAPIST

## 2020-09-21 NOTE — PROGRESS NOTES
Patient: Jaquan Mckay   : 1970  Referring practitioner: Jose Ruiz, *  Date of Initial Visit: Type: THERAPY  Noted: 2020  Today's Date: 2020  Patient seen for 11 sessions           Subjective Evaluation    History of Present Illness    Subjective comment: Pt reports having 8/10 pain today.  Pt reports having pain with dribbling and shooting basketball.  Pt advised to reduce sports until cleared by MD.       Objective   See Exercise, Manual, and Modality Logs for complete treatment.       Assessment & Plan     Assessment  Assessment details: Tx today consisted of mh to right elbow followed by manual stretching, there ex for improved elbow and wrist stability and ended with US for edema control.  Pt responded well to tx today with added resistance, yet cont to have pain with elbow extension exercises. Pt reported 7/10 post pain.    Plan  Plan details: Will follow progressing elbow stability and decreased pain.        Visit Diagnoses:    ICD-10-CM ICD-9-CM   1. Right elbow pain  M25.521 719.42   2. Decreased ROM of right elbow  M25.621 719.52       Progress strengthening /stabilization /functional activity           Timed:  Manual Therapy:         mins  69168;  Therapeutic Exercise:    33     mins  74725;     Neuromuscular Jazmin:        mins  28649;    Therapeutic Activity:          mins  04893;     Gait Training:           mins  84694;     Ultrasound:     8     mins  43453;    Electrical Stimulation:         mins  61707 ( );    Untimed:  Electrical Stimulation:         mins  79419 ( );  Mechanical Traction:         mins  99514;     Timed Treatment:   41   mins   Total Treatment:     46   mins  Win Rodriguez, PT  Physical Therapist

## 2020-09-23 ENCOUNTER — APPOINTMENT (OUTPATIENT)
Dept: GENERAL RADIOLOGY | Facility: HOSPITAL | Age: 50
End: 2020-09-23

## 2020-09-23 ENCOUNTER — OFFICE VISIT (OUTPATIENT)
Dept: ORTHOPEDIC SURGERY | Facility: CLINIC | Age: 50
End: 2020-09-23

## 2020-09-23 ENCOUNTER — HOSPITAL ENCOUNTER (EMERGENCY)
Facility: HOSPITAL | Age: 50
Discharge: HOME | End: 2020-09-23
Attending: EMERGENCY MEDICINE | Admitting: EMERGENCY MEDICINE

## 2020-09-23 VITALS — WEIGHT: 228 LBS | HEIGHT: 67 IN | TEMPERATURE: 97.8 F | BODY MASS INDEX: 35.79 KG/M2

## 2020-09-23 DIAGNOSIS — Z09 POSTOP CHECK: ICD-10-CM

## 2020-09-23 DIAGNOSIS — M77.11 LATERAL EPICONDYLITIS, RIGHT ELBOW: Primary | ICD-10-CM

## 2020-09-23 DIAGNOSIS — M25.562 ACUTE PAIN OF LEFT KNEE: Primary | ICD-10-CM

## 2020-09-23 PROCEDURE — 99024 POSTOP FOLLOW-UP VISIT: CPT | Performed by: ORTHOPAEDIC SURGERY

## 2020-09-23 PROCEDURE — 99283 EMERGENCY DEPT VISIT LOW MDM: CPT

## 2020-09-23 PROCEDURE — 73562 X-RAY EXAM OF KNEE 3: CPT

## 2020-09-23 NOTE — PROGRESS NOTES
Follow-up Visit         Patient: Jaquan Mckay  YOB: 1970  Date of Encounter: 09/23/2020      Chief  Complaint:   Chief Complaint   Patient presents with   • Right Elbow - Follow-up, Post-op, Pain     07/23/20 (62 days post-op)     Jose Ruiz MD     Right Tennis Elbow Release - Right   Lateral Epicondylar Release - Right                HPI:  Jaquan Mckay, 49 y.o. male presents follow-up status post tennis elbow release right elbow 7/23/2020 he is improving.  He is attended physical therapy for the past 3 weeks after initially not attending physical therapy.  He reports improvement in his pain and improvement in his strength.        Medical History:  Patient Active Problem List   Diagnosis   • Gastroesophageal reflux disease without esophagitis   • Arthritis   • Hypertension   • Hyperlipidemia   • Anxiety   • Varicocele   • Varicocele present on ultrasound of scrotum   • Obesity (BMI 30.0-34.9)   • Chronic bilateral low back pain with left-sided sciatica   • Seasonal allergic rhinitis due to pollen   • Mild persistent asthma without complication   • Precordial pain   • Dysuria   • Congenital coronary artery anomaly   • BMI 35.0-35.9,adult   • Chondromalacia, knee   • Achilles tendinitis of both lower extremities   • Muscle spasm of both lower legs   • Personal history of allergy to shellfish   • Sensation of cold in lower extremity   • Varicose vein of leg   • Heart palpitations   • Shortness of breath   • Generalized anxiety disorder   • Chronic elbow pain, right   • Chest pain   • Unstable angina (CMS/HCC)   • ASCVD (arteriosclerotic cardiovascular disease)   • Elevated prolactin level   • Other constipation   • Class 1 obesity due to excess calories with serious comorbidity and body mass index (BMI) of 33.0 to 33.9 in adult   • Bacteremia   • Therapeutic opioid-induced constipation (OIC)   • Rectal bleeding   • Bloating   • Generalized abdominal pain   • History of colon polyps  "  • Lateral epicondylitis of right elbow     Past Medical History:   Diagnosis Date   • Allergic    • Anxiety    • Arthritis    • Asthma    • Body piercing     REPORTS CYLICONE IN EARS   • Clotting disorder (CMS/HCC) 2004    had a knee surgery   • Coronary artery disease    • Depression    • DVT (deep venous thrombosis) (CMS/HCC)     RIGHT RIGHT KNEE AFTER SURGERY YEARS AGO IN 2001 OR 2004   • Elevated cholesterol    • Gastric ulcer    • GERD (gastroesophageal reflux disease)    • H/O migraine    • Headache    • Heart attack (CMS/HCC)     REPORTS \"LIGHT HEART ATTACK A LONG TIME AGO\"  \"EARLY 90'S\"   • History of seizures     REPORTS LAST EPISODE WAS AROUND 1995.   • Hostility    • Hyperlipidemia    • Hypertension    • Knee pain, acute     Left   • Low back pain    • Lyme disease    • Migraine    • MRSA (methicillin resistant Staphylococcus aureus)     REPORTS LAST TESTED + 2004. WAS TREATED HE REPORTS.  RIGHT ARM, RIGHT KNEE.   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    • Obesity    • Poor historian    • Carl [place] spotted fever    • Seizures (CMS/HCC)    • Sleep apnea    • Tattoo    • Wears glasses            Social History:  Social History     Socioeconomic History   • Marital status:      Spouse name: Becca   • Number of children: 2   • Years of education: 12   • Highest education level: Not on file   Occupational History   • Occupation: DISABLED   Social Needs   • Financial resource strain: Somewhat hard   • Food insecurity     Worry: Sometimes true     Inability: Sometimes true   • Transportation needs     Medical: No     Non-medical: No   Tobacco Use   • Smoking status: Current Every Day Smoker     Packs/day: 1.50     Years: 17.00     Pack years: 25.50     Types: Cigars, Cigarettes     Start date: 5/5/2010   • Smokeless tobacco: Never Used   • Tobacco comment: still uses 1.5 packs a day.   Substance and Sexual Activity   • Alcohol use: No   • Drug use: No   • Sexual activity: Defer     Partners: Female     Birth " control/protection: None   Lifestyle   • Physical activity     Days per week: 0 days     Minutes per session: 0 min   • Stress: To some extent   Relationships   • Social connections     Talks on phone: Once a week     Gets together: Once a week     Attends Taoism service: Never     Active member of club or organization: No     Attends meetings of clubs or organizations: Never     Relationship status:            Surgical History:  Past Surgical History:   Procedure Laterality Date   • ABDOMINAL SURGERY     • BACK SURGERY     • BRAIN SURGERY  1986    Tumor removal    • CARDIAC CATHETERIZATION N/A 9/28/2018    Procedure: Left Heart Cath;  Surgeon: Leandro Daily MD;  Location: [facility] CATH INVASIVE LOC[facility];  Service: Cardiology   • CHOLECYSTECTOMY     • COLONOSCOPY     • CYST REMOVAL      pilonidal cyst   • ELBOW EPICONDYLECTOMY Right 7/23/2020    Procedure: LATERAL EPICONDYLAR RELEASE;  Surgeon: Jose Ruiz MD;  Location: [facility];  Service: Orthopedics;  Laterality: Right;   • ENDOSCOPY     • FRACTURE SURGERY Right     elbow   • KNEE ARTHROSCOPY Left 10/20/2017    Procedure: Diagnostic arthroscopy left knee with chondroplasty;  Surgeon: Marco Aguirre MD;  Location: [facility];  Service:    • KNEE SURGERY Right    • MOUTH SURGERY      FULL MOUTH EXTRACTION   • OTHER SURGICAL HISTORY      REPORTS 7 TICKS REMOVED FROM RIGHT ARM IN 2001 OR 2002   • TENNIS ELBOW RELEASE Right 7/23/2020    Procedure: RIGHT TENNIS ELBOW RELEASE;  Surgeon: Jose Ruiz MD;  Location: [facility];  Service: Orthopedics;  Laterality: Right;   • TUMOR EXCISION      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  This report was finalized on 9/4/2020 9:37 AM by Dr. Raul Calixto MD.      Xr Knee 3 View Left    Result Date: 9/23/2020  Negative left knee. Signer Name: Marco Mondragon MD  Signed:  9/23/2020 10:36 PM  Workstation Name: [facility]  [facility]    Xr Abdomen 2 View With Chest 1 View    Result Date: 9/4/2020  Nonspecific acute abdominal series. No source of patient's abdominal pain was identified.  This report was finalized on 9/4/2020 9:40 AM by Dr. Raul Calixto MD.            Examination:   Examination elbow reveals oblique scar intact without surrounding erythema he demonstrates full extension and flexion full pronation supination mild tenderness lateral epicondyle mild pain with resisted wrist extension.        Assessment & Plan:   49 y.o. male presents follow-up improving after lateral release right elbow for lateral epicondylitis he will continue with his strengthening program follow-up in 6 weeks.         Diagnosis Plan   1. Lateral epicondylitis, right elbow     2. Postop check                   Cc:  Tiera Valenzuela APRN              This document has been electronically signed by Jose Ruiz MD   September 28, 2020 10:20 EDT

## 2020-09-24 ENCOUNTER — TREATMENT (OUTPATIENT)
Dept: PHYSICAL THERAPY | Facility: CLINIC | Age: 50
End: 2020-09-24

## 2020-09-24 VITALS
SYSTOLIC BLOOD PRESSURE: 172 MMHG | HEIGHT: 71 IN | DIASTOLIC BLOOD PRESSURE: 105 MMHG | TEMPERATURE: 98 F | OXYGEN SATURATION: 100 % | RESPIRATION RATE: 16 BRPM | HEART RATE: 88 BPM | WEIGHT: 220 LBS | BODY MASS INDEX: 30.8 KG/M2

## 2020-09-24 DIAGNOSIS — M25.621 DECREASED ROM OF RIGHT ELBOW: ICD-10-CM

## 2020-09-24 DIAGNOSIS — M25.521 RIGHT ELBOW PAIN: Primary | ICD-10-CM

## 2020-09-24 PROCEDURE — 97035 APP MDLTY 1+ULTRASOUND EA 15: CPT | Performed by: PHYSICAL THERAPIST

## 2020-09-24 PROCEDURE — 97110 THERAPEUTIC EXERCISES: CPT | Performed by: PHYSICAL THERAPIST

## 2020-09-24 NOTE — ED PROVIDER NOTES
"Subjective     History provided by:  Patient   used: No    Knee Pain  Location:  Knee  Time since incident:  2 hours  Injury: yes    Mechanism of injury: fall    Mechanism of injury comment:  Pt states that he came down wrong on his knee and felt a pop while playing basketball.   Knee location:  L knee  Chronicity:  New  Dislocation: no    Foreign body present:  No foreign bodies  Tetanus status:  Up to date  Prior injury to area:  No  Relieved by:  Rest, ice and elevation  Worsened by:  Activity  Ineffective treatments:  None tried  Associated symptoms: swelling        Review of Systems   Constitutional: Negative.    HENT: Negative.    Eyes: Negative.    Respiratory: Negative.    Cardiovascular: Negative.    Gastrointestinal: Negative.    Endocrine: Negative.    Genitourinary: Negative.    Musculoskeletal:        Left knee pain     Skin: Negative.    Allergic/Immunologic: Negative.    Neurological: Negative.    Hematological: Negative.    Psychiatric/Behavioral: Negative.    All other systems reviewed and are negative.      Past Medical History:   Diagnosis Date   • Allergic    • Anxiety    • Arthritis    • Asthma    • Body piercing     REPORTS CYLICONE IN EARS   • Clotting disorder (CMS/[facility]) 2004    had a knee surgery   • Coronary artery disease    • Depression    • DVT (deep venous thrombosis) (CMS/[facility])     RIGHT RIGHT KNEE AFTER SURGERY YEARS AGO IN 2001 OR 2004   • Elevated cholesterol    • Gastric ulcer    • GERD (gastroesophageal reflux disease)    • H/O migraine    • Headache    • Heart attack (CMS/[facility])     REPORTS \"LIGHT HEART ATTACK A LONG TIME AGO\"  \"EARLY 90'S\"   • History of seizures     REPORTS LAST EPISODE WAS AROUND 1995.   • Hostility    • Hyperlipidemia    • Hypertension    • Knee pain, acute     Left   • Low back pain    • Lyme disease    • Migraine    • MRSA (methicillin resistant Staphylococcus aureus)     REPORTS LAST TESTED + 2004. WAS TREATED HE REPORTS.  RIGHT ARM, RIGHT " "KNEE.   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    • Obesity    • Poor historian    • [place] spotted fever    • Seizures (CMS/HCC)    • Sleep apnea    • Tattoo    • Wears glasses        Allergies   Allergen Reactions   • Ciprofloxacin Anaphylaxis and Hives   • Mobic [Meloxicam] Other (See Comments)     Pt states, \"It make my feet and hands go numb and I can't hardly walk.\"    • Paxil [Paroxetine Hcl] Shortness Of Breath     Chest pain    • Peanut-Containing Drug Products Anaphylaxis   • Penicillins Anaphylaxis   • Pristiq [Desvenlafaxine Succinate Er] Dizziness   • Sulfa Antibiotics Anaphylaxis, Itching and Rash   • Doxycycline Hives   • Fish-Derived Products Hives   • Isosorbide Nitrate Rash     Rash, hives, had to use inhaler.    • Movantik [Naloxegol] Rash   • Clarithromycin Rash   • Clindamycin/Lincomycin Rash   • Contrast Dye Itching and Rash   • Diltiazem Rash   • Gabapentin Rash   • Keflex [Cephalexin] Rash   • Metoprolol Rash   • Prednisone Rash and Other (See Comments)     Face, feet, and legs go completely numb per patient   • Robitussin Cough+ Chest Max St [Dextromethorphan-Guaifenesin] Itching   • Shrimp (Diagnostic) Rash   • Spironolactone Rash   • Viibryd [Vilazodone Hcl] Itching and Rash   • Zoloft [Sertraline Hcl] Hives and Itching       Past Surgical History:   Procedure Laterality Date   • ABDOMINAL SURGERY     • BACK SURGERY     • BRAIN SURGERY  1986    Tumor removal    • CARDIAC CATHETERIZATION N/A 9/28/2018    Procedure: Left Heart Cath;  Surgeon: Leandro Daily MD;  Location: [facility] CATH INVASIVE LOCATION;  Service: Cardiology   • CHOLECYSTECTOMY     • COLONOSCOPY     • CYST REMOVAL      pilonidal cyst   • ELBOW EPICONDYLECTOMY Right 7/23/2020    Procedure: LATERAL EPICONDYLAR RELEASE;  Surgeon: Jose Ruiz MD;  Location: [facility] OR;  Service: Orthopedics;  Laterality: Right;   • ENDOSCOPY     • FRACTURE SURGERY Right     elbow   • KNEE ARTHROSCOPY Left 10/20/2017    Procedure: Diagnostic " arthroscopy left knee with chondroplasty;  Surgeon: Marco Aguirre MD;  Location: [facility];  Service:    • KNEE SURGERY Right    • MOUTH SURGERY      FULL MOUTH EXTRACTION   • OTHER SURGICAL HISTORY      REPORTS 7 TICKS REMOVED FROM RIGHT ARM IN 2001 OR 2002   • TENNIS ELBOW RELEASE Right 7/23/2020    Procedure: RIGHT TENNIS ELBOW RELEASE;  Surgeon: Jose Ruiz MD;  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       Family History   Problem Relation Age of Onset   • Diabetes Mother    • Hypertension Mother    • Stroke Mother    • Diabetes Father    • Skin cancer Father    • Hypertension Father    • Heart attack Father    • Diabetes Brother    • Hypertension Brother    • Heart disease Maternal Aunt    • Heart disease Maternal Uncle    • Heart disease Paternal Aunt    • Heart disease Paternal Uncle    • Heart disease Maternal Grandmother    • Heart disease Maternal Grandfather    • Heart disease Paternal Grandmother    • Heart disease Paternal Grandfather        Social History     Socioeconomic History   • Marital status:      Spouse name: Becca   • Number of children: 2   • Years of education: 12   • Highest education level: Not on file   Occupational History   • Occupation: DISABLED   Social Needs   • Financial resource strain: Somewhat hard   • Food insecurity     Worry: Sometimes true     Inability: Sometimes true   • Transportation needs     Medical: No     Non-medical: No   Tobacco Use   • Smoking status: Current Every Day Smoker     Packs/day: 1.50     Years: 17.00     Pack years: 25.50     Types: Cigars, Cigarettes     Start date: 5/5/2010   • Smokeless tobacco: Never Used   • Tobacco comment: still uses 1.5 packs a day.   Substance and Sexual Activity   • Alcohol use: No   • Drug use: No   • Sexual activity: Defer     Partners: Female     Birth control/protection: None   Lifestyle   • Physical  activity     Days per week: 0 days     Minutes per session: 0 min   • Stress: To some extent   Relationships   • Social connections     Talks on phone: Once a week     Gets together: Once a week     Attends Faith service: Never     Active member of club or organization: No     Attends meetings of clubs or organizations: Never     Relationship status:            Objective   Physical Exam  Vitals signs and nursing note reviewed.   Constitutional:       Appearance: Normal appearance. He is normal weight.   HENT:      Head: Normocephalic and atraumatic.      Right Ear: External ear normal.      Left Ear: External ear normal.      Nose: Nose normal.      Mouth/Throat:      Mouth: Mucous membranes are moist.      Pharynx: Oropharynx is clear.   Eyes:      Extraocular Movements: Extraocular movements intact.      Conjunctiva/sclera: Conjunctivae normal.      Pupils: Pupils are equal, round, and reactive to light.   Neck:      Musculoskeletal: Normal range of motion and neck supple.   Cardiovascular:      Rate and Rhythm: Normal rate and regular rhythm.      Pulses: Normal pulses.      Heart sounds: Normal heart sounds.   Pulmonary:      Effort: Pulmonary effort is normal.   Abdominal:      General: Abdomen is flat. Bowel sounds are normal.   Musculoskeletal: Normal range of motion.      Right knee: He exhibits normal range of motion, no swelling, no effusion, no ecchymosis, no deformity, no laceration, no erythema, normal alignment, no LCL laxity, normal patellar mobility, no bony tenderness, normal meniscus and no MCL laxity. Tenderness found.      Comments: Pt is ambulatory. Weight bearing. N/V intact.    Skin:     General: Skin is warm and dry.      Capillary Refill: Capillary refill takes less than 2 seconds.   Neurological:      General: No focal deficit present.      Mental Status: He is alert and oriented to person, place, and time. Mental status is at baseline.   Psychiatric:         Mood and Affect:  Mood normal.         Behavior: Behavior normal.         Thought Content: Thought content normal.         Judgment: Judgment normal.         Procedures           ED Course  ED Course as of Sep 23 2301   Wed Sep 23, 2020   2241 IMPRESSION:  Negative left knee.     Signer Name: Marco Mondragon MD   Signed: 9/23/2020 10:36 PM   Workstation Name: [email or web address]    [facility]   XR Knee 3 View Left [ML]      ED Course User Index  [ML] Tegan Bell PA                                           MDM  Number of Diagnoses or Management Options  Acute pain of left knee:      Amount and/or Complexity of Data Reviewed  Tests in the radiology section of CPT®: ordered and reviewed  Discuss the patient with other providers: yes    Risk of Complications, Morbidity, and/or Mortality  Presenting problems: low  Diagnostic procedures: low  Management options: low    Patient Progress  Patient progress: improved      Final diagnoses:   Acute pain of left knee            Tegan Bell PA  09/23/20 2301

## 2020-09-24 NOTE — PROGRESS NOTES
Patient: Jaquan Mckay   : 1970  Referring practitioner: No ref. provider found  Date of Initial Visit: No linked episodes  Today's Date: 2020  Patient seen for Visit count could not be calculated. Make sure you are using a visit which is associated with an episode. sessions           Subjective Evaluation    History of Present Illness    Subjective comment: Pt reports he carried a gallon of milk for 2 min with noted increased right elbow pain.  Pt instructed to minimize lifting at home and was notified to not lift a gallon of milk or case of water.  Pt reports having 9/10 pain.  Pt reports having a fall 3 weeks ago falling and landing on his right elbow.  Pt reports his Ortho evaluated him yesterday and pt reports his family MD evaluated him post fall and the patient received x-rays that demonstrated no fracture.         Objective   See Exercise, Manual, and Modality Logs for complete treatment.       Assessment & Plan     Assessment  Assessment details: Tx today consisted of mh to right elbow followed by manual conservative stretching and there ex followed by US to right elbow at end of session. Pt responded well to added web and reps with exercises today and reported 7/10 post pain.    Plan  Plan details: Therapy will follow for improved elbow stability and function with conservative approach.        Visit Diagnoses:  No diagnosis found.    Progress strengthening /stabilization /functional activity           Timed:  Manual Therapy:         mins  68926;  Therapeutic Exercise:    33     mins  77084;     Neuromuscular Jazmin:        mins  63617;    Therapeutic Activity:          mins  92257;     Gait Training:           mins  12629;     Ultrasound:     8     mins  76721;    Electrical Stimulation:         mins  16469 ( );    Untimed:  Electrical Stimulation:         mins  45763 ( );  Mechanical Traction:         mins  51802;     Timed Treatment:   41   mins   Total Treatment:     47    Mins(6min )  Win Rodriguez, PT  Physical Therapist

## 2020-09-25 ENCOUNTER — TREATMENT (OUTPATIENT)
Dept: PHYSICAL THERAPY | Facility: CLINIC | Age: 50
End: 2020-09-25

## 2020-09-25 DIAGNOSIS — M25.621 DECREASED ROM OF RIGHT ELBOW: ICD-10-CM

## 2020-09-25 DIAGNOSIS — M25.521 RIGHT ELBOW PAIN: Primary | ICD-10-CM

## 2020-09-25 PROCEDURE — 97110 THERAPEUTIC EXERCISES: CPT | Performed by: PHYSICAL THERAPIST

## 2020-09-25 PROCEDURE — 97035 APP MDLTY 1+ULTRASOUND EA 15: CPT | Performed by: PHYSICAL THERAPIST

## 2020-09-25 NOTE — PROGRESS NOTES
Patient: Jaquan Mckay   : 1970  Referring practitioner: Jose Ruiz, *  Date of Initial Visit: Type: THERAPY  Noted: 2020  Today's Date: 2020  Patient seen for 13 sessions           Subjective Evaluation    History of Present Illness    Subjective comment: Pt reports having 8/10 right elbow pain and reports the pain is shooting up into his right shoulder.       Objective   See Exercise, Manual, and Modality Logs for complete treatment.       Assessment/Plan    Visit Diagnoses:    ICD-10-CM ICD-9-CM   1. Right elbow pain  M25.521 719.42   2. Decreased ROM of right elbow  M25.621 719.52       Progress strengthening /stabilization /functional activity           Timed:  Manual Therapy:         mins  52586;  Therapeutic Exercise:    31     mins  35932;     Neuromuscular Jazmin:        mins  00065;    Therapeutic Activity:          mins  88203;     Gait Training:           mins  93735;     Ultrasound:     8     mins  77329;    Electrical Stimulation:         mins  25155 ( );    Untimed:  Electrical Stimulation:         mins  47712 ( );  Mechanical Traction:         mins  51378;     Timed Treatment:   39   mins   Total Treatment:     46   Mins(6min )  Win Rodriguez, PT  Physical Therapist

## 2020-09-28 ENCOUNTER — TREATMENT (OUTPATIENT)
Dept: PHYSICAL THERAPY | Facility: CLINIC | Age: 50
End: 2020-09-28

## 2020-09-28 DIAGNOSIS — M25.521 RIGHT ELBOW PAIN: Primary | ICD-10-CM

## 2020-09-28 DIAGNOSIS — M25.621 DECREASED ROM OF RIGHT ELBOW: ICD-10-CM

## 2020-09-28 PROCEDURE — 97110 THERAPEUTIC EXERCISES: CPT | Performed by: PHYSICAL THERAPIST

## 2020-09-28 PROCEDURE — 97035 APP MDLTY 1+ULTRASOUND EA 15: CPT | Performed by: PHYSICAL THERAPIST

## 2020-09-28 PROCEDURE — 97140 MANUAL THERAPY 1/> REGIONS: CPT | Performed by: PHYSICAL THERAPIST

## 2020-09-28 NOTE — PROGRESS NOTES
Patient: Jaquan Mckay   : 1970  Referring practitioner: Jose Ruiz, *  Date of Initial Visit: Type: THERAPY  Noted: 2020  Today's Date: 2020  Patient seen for 14 sessions           Subjective Evaluation    History of Present Illness    Subjective comment: Pt reports having 8/10 pre pain today. Pt reports carrying a gallon of milk yesterday and pain that shot up to his right shoulder.  Pt advised again to reduce wt with lifting with right arm.       Objective   See Exercise, Manual, and Modality Logs for complete treatment.       Assessment & Plan     Assessment  Assessment details: Tx initiated with stm and retrograde to right forearm, triceps and biceps. Pt was noted to have a trigger point in right biceps today.  Massage followed by there ex for improved stability and mobility and ended with US to right biceps and forearm.  Pt responded well to tx with reports of 7/10 post pain.    Plan  Plan details: Will follow progressing stability and improved function.        Visit Diagnoses:    ICD-10-CM ICD-9-CM   1. Right elbow pain  M25.521 719.42   2. Decreased ROM of right elbow  M25.621 719.52       Progress strengthening /stabilization /functional activity           Timed:  Manual Therapy:    15     mins  38454;  Therapeutic Exercise:    22     mins  01926;     Neuromuscular Jazmin:        mins  33931;    Therapeutic Activity:          mins  67717;     Gait Training:           mins  60146;     Ultrasound:     8     mins  29952;    Electrical Stimulation:         mins  17296 ( );    Untimed:  Electrical Stimulation:         mins  27365 ( );  Mechanical Traction:         mins  98667;     Timed Treatment:   45   mins   Total Treatment:     45   mins  Win Rodriguez, PT  Physical Therapist

## 2020-09-30 ENCOUNTER — TREATMENT (OUTPATIENT)
Dept: PHYSICAL THERAPY | Facility: CLINIC | Age: 50
End: 2020-09-30

## 2020-09-30 DIAGNOSIS — M25.521 RIGHT ELBOW PAIN: Primary | ICD-10-CM

## 2020-09-30 DIAGNOSIS — M25.621 DECREASED ROM OF RIGHT ELBOW: ICD-10-CM

## 2020-09-30 PROCEDURE — 97110 THERAPEUTIC EXERCISES: CPT | Performed by: PHYSICAL THERAPIST

## 2020-09-30 PROCEDURE — 97140 MANUAL THERAPY 1/> REGIONS: CPT | Performed by: PHYSICAL THERAPIST

## 2020-09-30 NOTE — PROGRESS NOTES
"   Physical Therapy Daily Progress Note      Patient: Jaquan Mckay   : 1970  Referring practitioner: Jose Ruiz, *  Date of Initial Visit: Type: THERAPY  Noted: 2020  Today's Date: 2020  Patient seen for 15 sessions           Subjective Evaluation    History of Present Illness    Subjective comment: Patient states that he \"still can't carry anything heavy\" and has pain with movement.  He notes that massage performed at last session helped ease pain slightly for approximately 1-2 hours.Pain  Current pain ratin (pre-8/10, post-6/10)           Objective   See Exercise, Manual, and Modality Logs for complete treatment.       Assessment & Plan     Assessment  Assessment details: Patient tolerated today's session well, with reports of decreased pain at conclusion of therapy.  Patient reported 8/10 pain pre-tx and 6/10 pain post-tx.  Therapy session consisted of MH, there ex, and manual therapy; no adverse reactions were noted with moist heat.  There ex continued to focus on ROM and gentle strengthening.  Patient noted tenderness at triceps and wrist extensors during gentle STM.  He will continue to be progressed per his tolerance and POC.        Visit Diagnoses:    ICD-10-CM ICD-9-CM   1. Right elbow pain  M25.521 719.42   2. Decreased ROM of right elbow  M25.621 719.52       Progress per Plan of Care and Progress strengthening /stabilization /functional activity           Timed:  Manual Therapy:    14     mins  53724;  Therapeutic Exercise:    27     mins  49379;     Neuromuscular Jazmin:        mins  03320;    Therapeutic Activity:          mins  79216;     Gait Training:           mins  89474;     Ultrasound:          mins  94548;    Electrical Stimulation:         mins  79904 ( );    Untimed:  Electrical Stimulation:         mins  15191 ( );  Mechanical Traction:         mins  21234;     Timed Treatment:   41   mins   Total Treatment:     46   mins  Hanna Burks, " PT  Physical Therapist

## 2020-10-01 ENCOUNTER — OFFICE VISIT (OUTPATIENT)
Dept: FAMILY MEDICINE CLINIC | Facility: CLINIC | Age: 50
End: 2020-10-01

## 2020-10-01 VITALS
TEMPERATURE: 97.3 F | HEIGHT: 71 IN | SYSTOLIC BLOOD PRESSURE: 136 MMHG | HEART RATE: 71 BPM | WEIGHT: 230 LBS | DIASTOLIC BLOOD PRESSURE: 76 MMHG | OXYGEN SATURATION: 97 % | BODY MASS INDEX: 32.2 KG/M2

## 2020-10-01 DIAGNOSIS — F41.9 ANXIETY: ICD-10-CM

## 2020-10-01 DIAGNOSIS — M54.42 CHRONIC BILATERAL LOW BACK PAIN WITH LEFT-SIDED SCIATICA: ICD-10-CM

## 2020-10-01 DIAGNOSIS — G89.29 CHRONIC BILATERAL LOW BACK PAIN WITH LEFT-SIDED SCIATICA: ICD-10-CM

## 2020-10-01 DIAGNOSIS — Z23 ENCOUNTER FOR IMMUNIZATION: ICD-10-CM

## 2020-10-01 DIAGNOSIS — I10 ESSENTIAL HYPERTENSION: Primary | ICD-10-CM

## 2020-10-01 PROCEDURE — 99214 OFFICE O/P EST MOD 30 MIN: CPT | Performed by: NURSE PRACTITIONER

## 2020-10-01 PROCEDURE — 90471 IMMUNIZATION ADMIN: CPT | Performed by: NURSE PRACTITIONER

## 2020-10-01 PROCEDURE — 90686 IIV4 VACC NO PRSV 0.5 ML IM: CPT | Performed by: NURSE PRACTITIONER

## 2020-10-01 RX ORDER — LISINOPRIL 30 MG/1
30 TABLET ORAL DAILY
Qty: 30 TABLET | Refills: 5 | Status: SHIPPED | OUTPATIENT
Start: 2020-10-01 | End: 2021-02-08 | Stop reason: SDUPTHER

## 2020-10-01 RX ORDER — TEMAZEPAM 7.5 MG/1
7.5 CAPSULE ORAL NIGHTLY PRN
Qty: 30 CAPSULE | Refills: 0 | Status: SHIPPED | OUTPATIENT
Start: 2020-10-01 | End: 2020-11-03

## 2020-10-01 NOTE — PROGRESS NOTES
"Subjective   Jaquan Mckay is a 49 y.o. male.     Chief Complaint   Patient presents with   • Hypertension       History of Present Illness     Hypertension-chronic and ongoing.  On Lisinopril 20 mg.  No negative side effects.  Mild elevation on triage today @ 140/82.  Some improvement after patient rested.  Patient in pain with elbow today and \"aggravated\" about his elbow.  No associated symptoms such as CP, SOA, HA, or dizziness.  He is under the care of Cardiology.  It is noted that he had an elevation also at the hospital when he was seen at the emergency department.  He reports he has been on the lisinopril \"a long time\".  His random checks he has had at other places has also been borderline elevated.  Back/leg/joint pain-chronic and ongoing. On Norco 10 mg BID.  Reports that he is also on Robaxin 750 mg.  Recent elbow surgery and under care of orthopedic.   He reports he would like to stop his pain medication as it \"is not helping\".    Right Elbow Pain-reports he continues to have elbow pain.  He reports a continued \"knot\" in his elbow.  He continues to go to PT.  He reports he gets some pain relief when he has PT.  He reports his  continues to be weak and it hurts to try to lift any minimum weight.  He is suppose to go back to Ortho in 4-6 weeks.    Left knee pain-reports he has twisted his knee and ankle.  He reports swelling of both since that time.  He reports he is suppose to be in a air cast for his ankle pain.  He reports he is planning to go back to Dr Florez about his ankle.   Anxiety and insomnia-reports mirtazapine is not helping as much as previous.  He would like to have have medication to help with sleep and anxiety.  He is also on BuSpar.  He has been on Klonopin in the past per this provider but was taken off as he preferred to have medication for his pain at that time.  He has been under the care of [facility] in the past and has had therapy as well [facility] "[facility].  He has failed multiple psych meds including Zoloft and Viibryd.  He is also been on Pristiq and Paxil without any improvement of symptoms as well as he had negative side effects.  Not at goal    The following portions of the patient's history were reviewed and updated as appropriate: CC, ROS, allergies, current medications, past family history, past medical history, past social history, past surgical history and problem list.      Review of Systems   Constitutional: Positive for fatigue. Negative for appetite change, unexpected weight gain and unexpected weight loss.   HENT: Negative for congestion, ear pain, postnasal drip, rhinorrhea, sore throat, swollen glands, trouble swallowing and voice change.    Eyes: Negative for pain and visual disturbance.   Respiratory: Negative for cough, chest tightness, shortness of breath and wheezing.    Cardiovascular: Negative for chest pain, palpitations and leg swelling.   Gastrointestinal: Negative for abdominal pain, blood in stool, constipation, diarrhea, nausea and indigestion.   Genitourinary: Negative for dysuria, hematuria and urgency.   Musculoskeletal: Positive for arthralgias, back pain, gait problem and joint swelling. Negative for myalgias.   Skin: Negative for color change and skin lesions.   Allergic/Immunologic: Negative.    Neurological: Negative for numbness, headache and confusion.   Hematological: Negative.    Psychiatric/Behavioral: Positive for depressed mood. Negative for dysphoric mood, sleep disturbance and suicidal ideas. The patient is not nervous/anxious.    All other systems reviewed and are negative.      Objective     /76   Pulse 71   Temp 97.3 °F (36.3 °C) (Temporal)   Ht 180.3 cm (71\")   Wt 104 kg (230 lb)   SpO2 97%   BMI 32.08 kg/m²     Physical Exam  Vitals signs reviewed.   Constitutional:       General: He is not in acute distress.     Appearance: He is well-developed. He is not diaphoretic.   HENT:      Head: " Normocephalic and atraumatic.      Comments: Oropharynx not examined.  Patient is presently wearing a face covering/mask due to COVID-19 pandemic.     Right Ear: Hearing, tympanic membrane, ear canal and external ear normal.      Left Ear: Hearing, tympanic membrane, ear canal and external ear normal.   Eyes:      General: Lids are normal. No scleral icterus.     Extraocular Movements:      Right eye: Normal extraocular motion and no nystagmus.      Left eye: Normal extraocular motion and no nystagmus.      Conjunctiva/sclera: Conjunctivae normal.      Pupils: Pupils are equal, round, and reactive to light.   Neck:      Musculoskeletal: Normal range of motion and neck supple.      Thyroid: No thyromegaly.      Vascular: No carotid bruit or JVD.      Trachea: No tracheal tenderness.   Cardiovascular:      Rate and Rhythm: Normal rate and regular rhythm.      Heart sounds: Normal heart sounds, S1 normal and S2 normal. No murmur.   Pulmonary:      Effort: Pulmonary effort is normal.      Breath sounds: Normal breath sounds.   Chest:      Chest wall: No tenderness.   Abdominal:      General: Bowel sounds are normal.      Palpations: Abdomen is soft. There is no mass.      Tenderness: There is no abdominal tenderness.   Musculoskeletal:         General: Tenderness present.      Right shoulder: He exhibits pain and spasm.      Right elbow: He exhibits decreased range of motion. Tenderness found. Medial epicondyle tenderness noted.      Left knee: He exhibits decreased range of motion (wearing hinged brace today) and swelling. Tenderness found. Medial joint line and lateral joint line tenderness noted.      Left ankle: He exhibits decreased range of motion, swelling and ecchymosis.      Lumbar back: He exhibits decreased range of motion and tenderness.      Right lower leg: No edema.      Left lower leg: No edema.        Feet:       Comments: Gait antalgic.   equal bilaterally. No muscular atrophy or flaccidity.      Lymphadenopathy:      Cervical: No cervical adenopathy.      Right cervical: No superficial cervical adenopathy.     Left cervical: No superficial cervical adenopathy.   Skin:     General: Skin is warm and dry.      Capillary Refill: Capillary refill takes less than 2 seconds.      Coloration: Skin is not pale.      Findings: No erythema.      Nails: There is no clubbing.     Neurological:      Mental Status: He is alert and oriented to person, place, and time.      Cranial Nerves: No cranial nerve deficit.      Sensory: No sensory deficit.      Motor: No tremor, atrophy or abnormal muscle tone.      Coordination: Coordination normal.      Gait: Gait normal.      Deep Tendon Reflexes: Reflexes are normal and symmetric.   Psychiatric:         Attention and Perception: He is attentive.         Mood and Affect: Mood normal.         Speech: Speech normal.         Behavior: Behavior normal.         Thought Content: Thought content normal.         Judgment: Judgment normal.         Assessment/Plan     Problem List Items Addressed This Visit        Cardiovascular and Mediastinum    Hypertension - Primary    Current Assessment & Plan     Will increase lisinopril to 30 mg today.  Continue under the care of cardiology.  Continue aspirin as directed.  Ambulatory BP monitoring either at home or random community checks.  Patient to report continued elevations >140/90.  Patient may come by office for checks if needed.          Relevant Medications    lisinopril (PRINIVIL,ZESTRIL) 30 MG tablet       Nervous and Auditory    Chronic bilateral low back pain with left-sided sciatica       Other    Anxiety    Relevant Medications    temazepam (RESTORIL) 7.5 MG capsule      Other Visit Diagnoses     Encounter for immunization        Relevant Orders    Fluarix Quad >6 Months ([id number]) (Completed)          Patient's Body mass index is 32.08 kg/m². BMI is above normal parameters. Recommendations include: nutrition counseling.        Understands disease processes and need for medications.  Understands reasons for urgent and emergent care.  Patient (& family) verbalized agreement for treatment plan.   Emotional support and active listening provided.  Patient provided time to verbalize feelings.    CHAVEZ reviewed today and consistent.  Will refill prescribed controlled medication today.  Patient is aware they cannot receive narcotics from any other provider except if under care of pain management or speciality clinic.  Risk and benefits of medication use has been reviewed.  History and physical exam exhibit continued safe and appropriate use of controlled substances.  The patient is aware of the potential for addiction and dependence.  This patient has been made aware of the appropriate use of such medications, including potential risk of somnolence, limited ability to drive and / or work safely, and potential for overdose.    It has also been made clear that these medications are for use by this patient only, without concomitant use of alcohol or other substances unless prescribed/advised by medical provider.  Patient understands they may be subject to UDS and pill counts at random.      Patient considered to be low risk for addiction due to use of single controlled medications.  Patient understands and accepts these risks.  Patient need for medication will be reassessed at each visit.  Doses will be adjusted according to patient need and findings.    Goal of TX: Patient will not have any adverse reactions of medication.  Will stop Norco.   Patient will have improvement in sleep hygiene/pattern with use of Restoril as needed as directed.      Medication Dispense Information    Hydrocodone/Acetaminophen   Dispensed: 9/3/2020 12:00 AM   Written:  9/3/2020   Unit strength: 325MG/10MG   Days supply: 30   Dispense Note: GivenName=Henrietta=LUISBirthDate=[id number][address] [place], [place], [address]   Quantity: 60 each    [facility]   Authorizing provider: LENNOX ROE   Received from: [facility] (Fill History)   Brand or Generic:       Change in pharmacy today from [facility] to [facility] due to [facility] closing.     Flu vaccine administered today.  Understands risk and benefits.  Understands may be 2-3 week before immunity is obtained.  Information sheet provided today on the 6506-2993 vax.    Order written for tubing and supplies for nebulizer.    Will stop Mondamin today.  Low dose of Restoril.  Patient may continue mirtazapine.    RTC 1 month, sooner if needed.             This document has been electronically signed by:  BALDOMERO Thao, FNP-C    Dragon disclaimer:  Much of this encounter note is an electronic transcription/translation of spoken language to printed text. The electronic translation of spoken language may permit erroneous, or at times, nonsensical words or phrases to be inadvertently transcribed; Although I have reviewed the note for such errors, some may still exist.

## 2020-10-01 NOTE — ASSESSMENT & PLAN NOTE
Will increase lisinopril to 30 mg today.  Continue under the care of cardiology.  Continue aspirin as directed.  Ambulatory BP monitoring either at home or random community checks.  Patient to report continued elevations >140/90.  Patient may come by office for checks if needed.

## 2020-10-02 ENCOUNTER — TREATMENT (OUTPATIENT)
Dept: PHYSICAL THERAPY | Facility: CLINIC | Age: 50
End: 2020-10-02

## 2020-10-02 DIAGNOSIS — M25.621 DECREASED ROM OF RIGHT ELBOW: ICD-10-CM

## 2020-10-02 DIAGNOSIS — M25.521 RIGHT ELBOW PAIN: Primary | ICD-10-CM

## 2020-10-02 PROCEDURE — 97110 THERAPEUTIC EXERCISES: CPT | Performed by: PHYSICAL THERAPIST

## 2020-10-02 PROCEDURE — 97140 MANUAL THERAPY 1/> REGIONS: CPT | Performed by: PHYSICAL THERAPIST

## 2020-10-02 NOTE — PROGRESS NOTES
Patient: Jaquan Mckay   : 1970  Referring practitioner: Jose Ruiz, *  Date of Initial Visit: Type: THERAPY  Noted: 2020  Today's Date: 10/2/2020  Patient seen for 16 sessions           Subjective Evaluation    History of Present Illness    Subjective comment: Pt reports his  strength has improved and he has 8/10 pain today in the elbow.       Objective   See Exercise, Manual, and Modality Logs for complete treatment.       Assessment & Plan     Assessment  Assessment details: Tx today consisted of mh to right elbow followed by manual tx and there ex for improved ROM and stability with added UE bike, and added weights.  Pt responded well to added exercises and was noted to have 80 pound of  strength today.  Pt reported decreased pain of 6/10 post tx    Plan  Plan details: Will follow for optimal gains.        Visit Diagnoses:    ICD-10-CM ICD-9-CM   1. Right elbow pain  M25.521 719.42   2. Decreased ROM of right elbow  M25.621 719.52       Progress strengthening /stabilization /functional activity           Timed:  Manual Therapy:    15     mins  29481;  Therapeutic Exercise:    26     mins  51925;     Neuromuscular Jazmin:        mins  92169;    Therapeutic Activity:          mins  85915;     Gait Training:           mins  46121;     Ultrasound:          mins  89823;    Electrical Stimulation:         mins  59782 ( );    Untimed:  Electrical Stimulation:         mins  91907 ( );  Mechanical Traction:         mins  74694;     Timed Treatment:   41   mins   Total Treatment:     47   Mins(6min)  Win Rodriguez, PT  Physical Therapist

## 2020-10-05 ENCOUNTER — TREATMENT (OUTPATIENT)
Dept: PHYSICAL THERAPY | Facility: CLINIC | Age: 50
End: 2020-10-05

## 2020-10-05 DIAGNOSIS — M25.521 RIGHT ELBOW PAIN: Primary | ICD-10-CM

## 2020-10-05 DIAGNOSIS — M25.621 DECREASED ROM OF RIGHT ELBOW: ICD-10-CM

## 2020-10-05 DIAGNOSIS — S82.831D CLOSED FRACTURE OF DISTAL END OF RIGHT FIBULA WITH ROUTINE HEALING, UNSPECIFIED FRACTURE MORPHOLOGY, SUBSEQUENT ENCOUNTER: ICD-10-CM

## 2020-10-05 PROCEDURE — 97110 THERAPEUTIC EXERCISES: CPT | Performed by: PHYSICAL THERAPIST

## 2020-10-05 PROCEDURE — 97140 MANUAL THERAPY 1/> REGIONS: CPT | Performed by: PHYSICAL THERAPIST

## 2020-10-05 NOTE — PROGRESS NOTES
Patient: Jaquan Mckay   : 1970  Referring practitioner: Jose Ruiz, *  Date of Initial Visit: Type: THERAPY  Noted: 2020  Today's Date: 10/5/2020  Patient seen for 17 sessions           Subjective Evaluation    History of Present Illness    Subjective comment:  Pt reports having 7/10 pain today.       Objective   See Exercise, Manual, and Modality Logs for complete treatment.       Assessment & Plan     Assessment  Assessment details: Tx today consisted of mh to elbow followed by manual stm  To elbow and arm, there ex for ROM and stability and ended with US to forearm.  Pt responded well to tx today with reports of decreased pain following session.  Pt demonstrated improved eccentric control with stability exercises.  Pt reported having 5/10 pain today.    Plan  Plan details: Will follow progressing elbow stability and decreased pain.        Visit Diagnoses:    ICD-10-CM ICD-9-CM   1. Right elbow pain  M25.521 719.42   2. Decreased ROM of right elbow  M25.621 719.52   3. Closed fracture of distal end of right fibula with routine healing, unspecified fracture morphology, subsequent encounter  S82.831D V54.16       Progress strengthening /stabilization /functional activity           Timed:  Manual Therapy:    15     mins  48833;  Therapeutic Exercise:    25     mins  34488;     Neuromuscular Jazmin:        mins  65211;    Therapeutic Activity:          mins  47332;     Gait Training:           mins  61591;     Ultrasound:          mins  11119; (8min NC min)   Electrical Stimulation:         mins  65433 ( );    Untimed:  Electrical Stimulation:         mins  82521 ( );  Mechanical Traction:         mins  75796;     Timed Treatment:   48   mins   Total Treatment:     55   Mins(6min mh pre tx)  Win Rodriguez, PT  Physical Therapist

## 2020-10-06 ENCOUNTER — TELEPHONE (OUTPATIENT)
Dept: FAMILY MEDICINE CLINIC | Facility: CLINIC | Age: 50
End: 2020-10-06

## 2020-10-06 DIAGNOSIS — F51.04 PSYCHOPHYSIOLOGICAL INSOMNIA: Primary | ICD-10-CM

## 2020-10-06 RX ORDER — ESTAZOLAM 1 MG
1 TABLET ORAL NIGHTLY
Qty: 20 TABLET | Refills: 0 | Status: SHIPPED | OUTPATIENT
Start: 2020-10-06 | End: 2020-11-03 | Stop reason: RX

## 2020-10-06 NOTE — TELEPHONE ENCOUNTER
Pt is aware of this information.       ----- Message from BALDOMERO Thao sent at 10/6/2020  2:58 PM EDT -----  Changed to Estazolam  ----- Message -----  From: Lorna Morillo MA  Sent: 10/6/2020  10:24 AM EDT  To: BALDOMERO Thao    Pt called and stated that his RESTORIL is not covered on his insurance. They are preferring ativan or estazolam.

## 2020-10-07 ENCOUNTER — TREATMENT (OUTPATIENT)
Dept: PHYSICAL THERAPY | Facility: CLINIC | Age: 50
End: 2020-10-07

## 2020-10-07 DIAGNOSIS — M25.621 DECREASED ROM OF RIGHT ELBOW: ICD-10-CM

## 2020-10-07 DIAGNOSIS — M25.521 RIGHT ELBOW PAIN: Primary | ICD-10-CM

## 2020-10-07 DIAGNOSIS — S82.831D CLOSED FRACTURE OF DISTAL END OF RIGHT FIBULA WITH ROUTINE HEALING, UNSPECIFIED FRACTURE MORPHOLOGY, SUBSEQUENT ENCOUNTER: ICD-10-CM

## 2020-10-07 PROCEDURE — 97140 MANUAL THERAPY 1/> REGIONS: CPT | Performed by: PHYSICAL THERAPIST

## 2020-10-07 PROCEDURE — 97110 THERAPEUTIC EXERCISES: CPT | Performed by: PHYSICAL THERAPIST

## 2020-10-07 NOTE — PROGRESS NOTES
Patient: Jaquan Mckay   : 1970  Referring practitioner: Jose Ruiz, *  Date of Initial Visit: Type: THERAPY  Noted: 2020  Today's Date: 10/7/2020  Patient seen for 18 sessions           Subjective Evaluation    History of Present Illness    Subjective comment: Pt reports having 7/10 pain today.  Pt reports he will soon start working out at the fitness gym.       Objective   See Exercise, Manual, and Modality Logs for complete treatment.       Assessment & Plan     Assessment  Assessment details: Tx today consisted of mh to elbow followed by manual stm and stretching of the wrist and elbow followed by US to wrist ext area.  Pt responded well to increased resistance with elbow arom/bike and added wrist roller.  Pt reported he lowest pain score following tx at 4/10.    Plan  Plan details: Therapy will follow for progression toward fitness gym and discharge with stability program.        Visit Diagnoses:    ICD-10-CM ICD-9-CM   1. Right elbow pain  M25.521 719.42   2. Decreased ROM of right elbow  M25.621 719.52   3. Closed fracture of distal end of right fibula with routine healing, unspecified fracture morphology, subsequent encounter  S82.831D V54.16       Progress strengthening /stabilization /functional activity           Timed:  Manual Therapy:    15     mins  25222;  Therapeutic Exercise:    27     mins  05472;     Neuromuscular Jazmin:        mins  15213;    Therapeutic Activity:          mins  72573;     Gait Training:           mins  60893;     Ultrasound:          mins  02767;  (8 min NC)  Electrical Stimulation:         mins  39403 ( );    Untimed:  Electrical Stimulation:         mins  55883 ( );  Mechanical Traction:         mins  23028;     Timed Treatment:   50   mins   Total Treatment:     56   Mins(6min mh)  Win Rodriguze, PT  Physical Therapist

## 2020-10-08 RX ORDER — PHENYTOIN SODIUM 100 MG/1
200 CAPSULE, EXTENDED RELEASE ORAL 2 TIMES DAILY
Qty: 120 CAPSULE | Refills: 5 | Status: SHIPPED | OUTPATIENT
Start: 2020-10-08 | End: 2021-02-08 | Stop reason: SDUPTHER

## 2020-10-12 ENCOUNTER — TREATMENT (OUTPATIENT)
Dept: PHYSICAL THERAPY | Facility: CLINIC | Age: 50
End: 2020-10-12

## 2020-10-12 DIAGNOSIS — M25.621 DECREASED ROM OF RIGHT ELBOW: ICD-10-CM

## 2020-10-12 DIAGNOSIS — M25.521 RIGHT ELBOW PAIN: Primary | ICD-10-CM

## 2020-10-12 DIAGNOSIS — S82.831D CLOSED FRACTURE OF DISTAL END OF RIGHT FIBULA WITH ROUTINE HEALING, UNSPECIFIED FRACTURE MORPHOLOGY, SUBSEQUENT ENCOUNTER: ICD-10-CM

## 2020-10-12 DIAGNOSIS — S82.61XD CLOSED AVULSION FRACTURE OF LATERAL MALLEOLUS OF RIGHT FIBULA WITH ROUTINE HEALING, SUBSEQUENT ENCOUNTER: ICD-10-CM

## 2020-10-12 PROCEDURE — 97140 MANUAL THERAPY 1/> REGIONS: CPT | Performed by: PHYSICAL THERAPIST

## 2020-10-12 PROCEDURE — 97110 THERAPEUTIC EXERCISES: CPT | Performed by: PHYSICAL THERAPIST

## 2020-10-12 NOTE — PROGRESS NOTES
Patient: Jaquan Mckay   : 1970  Referring practitioner: Jose Ruiz, *  Date of Initial Visit: Type: THERAPY  Noted: 2020  Today's Date: 10/12/2020  Patient seen for 19 sessions           Subjective Evaluation    History of Present Illness    Subjective comment: Pt reports his elbow cont to improve and his pain today is 7/10.       Objective   See Exercise, Manual, and Modality Logs for complete treatment.       Assessment & Plan     Assessment  Assessment details: Tx today consisted of mh to elbow followed by manual stretching, there ex for improved stability and ROM and ended with manual stm to elbow and shoulder.  Pt responded well to tx today with noted decreased pain to 4/10 following session.  Pt responded well to added mid rows, lat rows and triceps curls today.    Plan  Plan details: Therapy will follow for improved stability and progress toward discharge.        Visit Diagnoses:    ICD-10-CM ICD-9-CM   1. Right elbow pain  M25.521 719.42   2. Decreased ROM of right elbow  M25.621 719.52   3. Closed fracture of distal end of right fibula with routine healing, unspecified fracture morphology, subsequent encounter  S82.831D V54.16   4. Closed avulsion fracture of lateral malleolus of right fibula with routine healing, subsequent encounter  S82.61XD V54.19       Progress strengthening /stabilization /functional activity           Timed:  Manual Therapy:    10     mins  31663;  Therapeutic Exercise:    32     mins  15913;     Neuromuscular Jazmin:        mins  20968;    Therapeutic Activity:          mins  16254;     Gait Training:           mins  84879;     Ultrasound:          mins  97531;    Electrical Stimulation:         mins  20768 ( );    Untimed:  Electrical Stimulation:         mins  31911 ( );  Mechanical Traction:         mins  31178;     Timed Treatment:   42   mins   Total Treatment:     48   Mins( 6min)  Win Rodriguez, PT  Physical  Therapist

## 2020-10-14 ENCOUNTER — TREATMENT (OUTPATIENT)
Dept: PHYSICAL THERAPY | Facility: CLINIC | Age: 50
End: 2020-10-14

## 2020-10-14 DIAGNOSIS — M25.621 DECREASED ROM OF RIGHT ELBOW: ICD-10-CM

## 2020-10-14 DIAGNOSIS — M25.521 RIGHT ELBOW PAIN: Primary | ICD-10-CM

## 2020-10-14 DIAGNOSIS — S82.831D CLOSED FRACTURE OF DISTAL END OF RIGHT FIBULA WITH ROUTINE HEALING, UNSPECIFIED FRACTURE MORPHOLOGY, SUBSEQUENT ENCOUNTER: ICD-10-CM

## 2020-10-14 PROCEDURE — 97140 MANUAL THERAPY 1/> REGIONS: CPT | Performed by: PHYSICAL THERAPIST

## 2020-10-14 PROCEDURE — 97110 THERAPEUTIC EXERCISES: CPT | Performed by: PHYSICAL THERAPIST

## 2020-10-14 NOTE — PROGRESS NOTES
Patient: Jaquan Mckay   : 1970  Referring practitioner: Jsoe Ruiz, *  Date of Initial Visit: Type: THERAPY  Noted: 2020  Today's Date: 10/14/2020  Patient seen for 20 sessions           Subjective Evaluation    History of Present Illness    Subjective comment: Pt reports being ready for discharge.  Pt reports he has began to work out at the local fitness gym.Pain  Current pain ratin  At best pain rating: 3  At worst pain ratin           Objective          Active Range of Motion     Right Elbow   Flexion: WFL  Extension: 0 degrees   Forearm supination: WFL  Forearm pronation: WFL    Right Wrist   Wrist flexion: 90 degrees   Wrist extension: 80 degrees     Strength/Myotome Testing     Left Elbow   Flexion: 4+  Extension: 4+    Right Elbow   Flexion: 4+  Extension: 4+    Right Wrist/Hand   Wrist extension: 4+  Wrist flexion: 4+  Radial deviation: 4+  Ulnar deviation: 4+     (2nd hand position)     Trial 1: 90 lbs    Trial 2: 95 lbs    Trial 3: 100 lbs    Average: 95 lbs      See Exercise, Manual, and Modality Logs for complete treatment.       Assessment & Plan     Assessment  Assessment details: Tx today consisted of mh to elbow followed by manual stretching, there ex for improved stability and ended with stm following session.  Pt has met max level and was instructed to contact therapy as needed.  Prognosis: good    Goals  Plan Goals: Goals  Plan Goals: STG 4 weeks    1 Pt will be instructed in a HEP. met  2 Pt will report pain no greater than 5/10 with ADLs.met  3 Pt will improve right  strength to 40 pounds of pressure.  met  LTG 8 weeks    1 Pt will improve right  strength to 60 pounds to open grocery packages.met  2 Pt will improve right wrist ROM within 10 degrees of left for flexion and extension.met  3 Pt will report pain no greater than 3/10 with moderate lifting.not met    Plan  Plan details: Pt has met all goals and will be discharged at this time.  Pt  instructed to contact therapy as needed.        Visit Diagnoses:    ICD-10-CM ICD-9-CM   1. Right elbow pain  M25.521 719.42   2. Decreased ROM of right elbow  M25.621 719.52   3. Closed fracture of distal end of right fibula with routine healing, unspecified fracture morphology, subsequent encounter  S82.831D V54.16       Progress strengthening /stabilization /functional activity           Timed:  Manual Therapy:  13       mins  17379;  Therapeutic Exercise:    31     mins  54178;     Neuromuscular Jazmin:        mins  36331;    Therapeutic Activity:          mins  98718;     Gait Training:           mins  56231;     Ultrasound:          mins  11776;    Electrical Stimulation:         mins  87043 ( );    Untimed:  Electrical Stimulation:         mins  32653 ( );  Mechanical Traction:         mins  26461;     Timed Treatment:   44   mins   Total Treatment:     50   mins  Win Rodriguez, PT  Physical Therapist

## 2020-10-21 RX ORDER — AZITHROMYCIN 250 MG/1
TABLET, FILM COATED ORAL
Qty: 6 TABLET | Refills: 0 | Status: SHIPPED | OUTPATIENT
Start: 2020-10-21 | End: 2020-11-03

## 2020-10-30 ENCOUNTER — TELEPHONE (OUTPATIENT)
Dept: ORTHOPEDIC SURGERY | Facility: CLINIC | Age: 50
End: 2020-10-30

## 2020-10-30 NOTE — TELEPHONE ENCOUNTER
Called patient back to give him the contact number for Becky Moctezuma manager of [employer] regarding TENs unit. Becky's number is [phone]. He was very appreciative of the help.

## 2020-10-30 NOTE — TELEPHONE ENCOUNTER
Called Mr. Mckay to follow up on a complaint he placed against Dr. Ruiz. Mr. Mckay said he had surgery on 7/23/20, Dr. Ruiz had been treating his elbow with cortisone injections. Until Mr. Mckay said no more shots after Dr. Ruiz had offered it. Dr. Ruiz did the surgery reluctantly.  Mr. Mckay said he couldn’t bend his arm across his stomach after surgery, at his follow up appointment Dr. Ruiz forced it back across and put another cast on it.   He said the cast that was put on him after surgery was too tight so he had to go to the Emergency room. He said when he got to the ER his fingernails were black. The ER removed his cast. He said Dr. Ruiz had given him pain medicine after surgery but after that he wouldn’t give anything else. Patient stated he went back to his family doctor who gave him some pain medicine because the family doctor couldn’t believe Dr. Ruiz wouldn’t. Also 2 months after surgery he states he fell and landed on this same elbow, he had laid in the floor 3 hours because his girlfriend couldn’t get in and the landlord had to come open the door so that the ambulance could get in. His elbow had hit the hard tile floor pretty hard, he had migraine from where his head hit the floor.     Mr. Mckay did say he requested his medical records and he got everything except Dr. Ruiz’s OP note and would like for someone to send that to him.  I offered to get a referral for pain management and he said he was going to wait 2 or 3 more months and go back to his family doctor so they would give him some. He would continue to take Tylenol. I also offered one of our  Becky Moctezuma with [employer] it’s a tens unit that helps treat pain. Dr. Will and Dr. Glass use this to help their patients with pain and regain strength and movement. Mr. Mckay was very interested in this,  I told him I would get a referral put in for this so Becky could work on getting it approved with patient’s  insurance.

## 2020-11-03 ENCOUNTER — OFFICE VISIT (OUTPATIENT)
Dept: FAMILY MEDICINE CLINIC | Facility: CLINIC | Age: 50
End: 2020-11-03

## 2020-11-03 VITALS
DIASTOLIC BLOOD PRESSURE: 70 MMHG | WEIGHT: 225 LBS | HEART RATE: 99 BPM | BODY MASS INDEX: 31.5 KG/M2 | TEMPERATURE: 97.5 F | OXYGEN SATURATION: 98 % | HEIGHT: 71 IN | SYSTOLIC BLOOD PRESSURE: 122 MMHG

## 2020-11-03 DIAGNOSIS — M54.42 CHRONIC BILATERAL LOW BACK PAIN WITH LEFT-SIDED SCIATICA: Primary | ICD-10-CM

## 2020-11-03 DIAGNOSIS — F51.04 PSYCHOPHYSIOLOGICAL INSOMNIA: ICD-10-CM

## 2020-11-03 DIAGNOSIS — R53.83 OTHER FATIGUE: ICD-10-CM

## 2020-11-03 DIAGNOSIS — G89.29 CHRONIC BILATERAL LOW BACK PAIN WITH LEFT-SIDED SCIATICA: Primary | ICD-10-CM

## 2020-11-03 DIAGNOSIS — J31.0 CHRONIC RHINITIS: ICD-10-CM

## 2020-11-03 PROCEDURE — 99214 OFFICE O/P EST MOD 30 MIN: CPT | Performed by: NURSE PRACTITIONER

## 2020-11-03 RX ORDER — TEMAZEPAM 15 MG/1
15 CAPSULE ORAL NIGHTLY PRN
Qty: 30 CAPSULE | Refills: 0 | Status: SHIPPED | OUTPATIENT
Start: 2020-11-03 | End: 2020-12-03 | Stop reason: DRUGHIGH

## 2020-11-03 RX ORDER — METHOCARBAMOL 750 MG/1
750 TABLET, FILM COATED ORAL 2 TIMES DAILY PRN
Qty: 60 TABLET | Refills: 5 | Status: SHIPPED | OUTPATIENT
Start: 2020-11-03 | End: 2021-08-10 | Stop reason: SDUPTHER

## 2020-11-03 NOTE — ASSESSMENT & PLAN NOTE
Trial of Restoril.  Advised to hold Remeron until he is adjusted to Restoril.  Understands he may not need Remeron for insomnia any longer or may need a dose adjustment.

## 2020-11-03 NOTE — PROGRESS NOTES
"Subjective   Jaquan Mckay is a 49 y.o. male.     Chief Complaint   Patient presents with   • Hypertension       History of Present Illness     Multiple areas of joint pain-chronic and ongoing.  Patient is stopped his hydrocodone in October.  He reports he felt like the medication was not helping him.  Anxiety and insomnia-medications prescribed at last visit was not covered.  He was advised to continue under the care of [facility].  He is presently taking BuSpar 15 and Remeron 30 at times for insomnia.  Hypertension-chronic and ongoing.  Patient is presently taking lisinopril 30 mg.  No negative side effects.  He is also under the care of cardiology.  He takes aspirin 81 mg daily.   He reports he noted a elevation this morning on home checks and he checked BP.  He reports \"190's\".  He had some dizziness for \"about 30 minutes\".  That has since resolved.    Allergy shot reaction-reports approx 2 hours after his last shot he had a reaction.  He has reported to Allergy specialist.  He will be having reduction in dosing of his injection.    Fatigue-request to have testosterone level check.  He reports he always feels tired.  He has tried to be active but \"still tired\".    Knee swelling-left knee.  Has been under care of ortho.  He reports MRI ordered but he had to cancel. Appt needs rescheduled.  He is using cane for mobility support.  He continues to struggle with walking up and down steps.   Right elbow pain-continues to improve.  He reports some numbness in his first 3 digits.  Has been advised that symptom may not fully resolve for 6-12 months.  No acute concerns today.      The following portions of the patient's history were reviewed and updated as appropriate: CC, ROS, allergies, current medications, past family history, past medical history, past social history, past surgical history and problem list.    Review of Systems   Constitutional: Positive for fatigue. Negative for appetite change, unexpected weight gain " "and unexpected weight loss.   HENT: Negative for congestion, ear pain, postnasal drip, rhinorrhea, sore throat, swollen glands, trouble swallowing and voice change.    Eyes: Negative for pain and visual disturbance.   Respiratory: Negative for cough, chest tightness, shortness of breath and wheezing.    Cardiovascular: Negative for chest pain, palpitations and leg swelling.   Gastrointestinal: Negative for abdominal pain, blood in stool, constipation, diarrhea, nausea and indigestion.   Genitourinary: Negative for dysuria, hematuria and urgency.   Musculoskeletal: Positive for arthralgias and back pain. Negative for gait problem, joint swelling and myalgias.   Skin: Negative for color change and skin lesions.   Allergic/Immunologic: Negative.    Neurological: Negative for numbness, headache and confusion.   Hematological: Negative.    Psychiatric/Behavioral: Negative for dysphoric mood, sleep disturbance and suicidal ideas. The patient is not nervous/anxious.    All other systems reviewed and are negative.      Objective     /70   Pulse 99   Temp 97.5 °F (36.4 °C) (Tympanic)   Ht 180.3 cm (71\")   Wt 102 kg (225 lb)   SpO2 98%   BMI 31.38 kg/m²     Physical Exam  Vitals signs reviewed.   Constitutional:       General: He is not in acute distress.     Appearance: He is well-developed. He is not diaphoretic.   HENT:      Head: Normocephalic and atraumatic.      Comments: Oropharynx not examined.  Patient is presently wearing a face covering/mask due to COVID-19 pandemic.     Right Ear: Hearing, tympanic membrane, ear canal and external ear normal.      Left Ear: Hearing, tympanic membrane, ear canal and external ear normal.   Eyes:      General: Lids are normal. No scleral icterus.     Extraocular Movements:      Right eye: Normal extraocular motion and no nystagmus.      Left eye: Normal extraocular motion and no nystagmus.      Conjunctiva/sclera: Conjunctivae normal.      Pupils: Pupils are equal, round, " and reactive to light.   Neck:      Musculoskeletal: Normal range of motion and neck supple.      Thyroid: No thyromegaly.      Vascular: No carotid bruit or JVD.      Trachea: No tracheal tenderness.   Cardiovascular:      Rate and Rhythm: Normal rate and regular rhythm.      Heart sounds: Normal heart sounds, S1 normal and S2 normal. No murmur.   Pulmonary:      Effort: Pulmonary effort is normal.      Breath sounds: Normal breath sounds.   Chest:      Chest wall: No tenderness.   Abdominal:      General: Bowel sounds are normal.      Palpations: Abdomen is soft. There is no mass.      Tenderness: There is no abdominal tenderness.   Musculoskeletal:         General: Tenderness present.      Right shoulder: He exhibits normal range of motion, no pain and no spasm.      Right elbow: He exhibits decreased range of motion (improving). Tenderness (but to lesser extent than previous) found. Medial epicondyle tenderness noted.      Left knee: He exhibits decreased range of motion and swelling (distal medial patella region.  not pitting). Tenderness found. Medial joint line and lateral joint line tenderness noted.      Left ankle: He exhibits decreased range of motion, swelling and ecchymosis.      Lumbar back: He exhibits decreased range of motion and tenderness.      Right lower leg: No edema.      Left lower leg: No edema.      Comments: Gait antalgic.   equal bilaterally. No muscular atrophy or flaccidity.  Multiple varicosities in BLE   Lymphadenopathy:      Cervical: No cervical adenopathy.      Right cervical: No superficial cervical adenopathy.     Left cervical: No superficial cervical adenopathy.   Skin:     General: Skin is warm and dry.      Capillary Refill: Capillary refill takes less than 2 seconds.      Coloration: Skin is not pale.      Findings: No erythema.      Nails: There is no clubbing.     Neurological:      Mental Status: He is alert and oriented to person, place, and time.      Cranial  Nerves: No cranial nerve deficit.      Sensory: No sensory deficit.      Motor: No tremor, atrophy or abnormal muscle tone.      Coordination: Coordination normal.      Gait: Gait normal.      Deep Tendon Reflexes: Reflexes are normal and symmetric.   Psychiatric:         Attention and Perception: He is attentive.         Mood and Affect: Mood normal.         Speech: Speech normal.         Behavior: Behavior normal.         Thought Content: Thought content normal.         Judgment: Judgment normal.         Assessment/Plan     Problem List Items Addressed This Visit        Respiratory    Chronic rhinitis    Current Assessment & Plan     Continue under care of allergy specialist.             Nervous and Auditory    Chronic bilateral low back pain with left-sided sciatica - Primary    Relevant Medications    methocarbamol (ROBAXIN) 750 MG tablet       Other    Psychophysiological insomnia    Current Assessment & Plan     Trial of Restoril.  Advised to hold Remeron until he is adjusted to Restoril.  Understands he may not need Remeron for insomnia any longer or may need a dose adjustment.         Relevant Medications    temazepam (RESTORIL) 15 MG capsule      Other Visit Diagnoses     Other fatigue        Relevant Orders    Testosterone, Free, Total          Patient's Body mass index is 31.38 kg/m². BMI is above normal parameters. Recommendations include: nutrition counseling.       Understands disease processes and need for medications.  Understands reasons for urgent and emergent care.  Patient (& family) verbalized agreement for treatment plan.   Emotional support and active listening provided.  Patient provided time to verbalize feelings.    CHAVEZ/PDMP reviewed today and consistent.  Will refill prescribed controlled medication today.  Patient is aware they cannot receive narcotics from any other provider except if under care of pain management or speciality clinic.  Risk and benefits of medication use has been  reviewed.  History and physical exam exhibit continued safe and appropriate use of controlled substances.  The patient is aware of the potential for addiction and dependence.  This patient has been made aware of the appropriate use of such medications, including potential risk of somnolence, limited ability to drive and / or work safely, and potential for overdose.    It has also been made clear that these medications are for use by this patient only, without concomitant use of alcohol or other substances unless prescribed/advised by medical provider.  Patient understands they may be subject to UDS and pill counts at random.      Patient considered to be low risk for addiction due to use of single controlled medications.  Patient understands and accepts these risks.  Patient need for medication will be reassessed at each visit.  Doses will be adjusted according to patient need and findings.    Goal of TX: Patient will not have any adverse reactions of medication.  Patient will have improvement in sleep hygiene/pattern with use of Restoril as needed as directed.    Testosterone level ordered.  Patient would like to obtain at [facility].     RTC 1 month, sooner if needed.              This document has been electronically signed by:  BALDOMERO Thao FNP-C Dragon disclaimer:  Much of this encounter note is an electronic transcription/translation of spoken language to printed text. The electronic translation of spoken language may permit erroneous, or at times, nonsensical words or phrases to be inadvertently transcribed; Although I have reviewed the note for such errors, some may still exist.

## 2020-11-06 ENCOUNTER — PRIOR AUTHORIZATION (OUTPATIENT)
Dept: FAMILY MEDICINE CLINIC | Facility: CLINIC | Age: 50
End: 2020-11-06

## 2020-11-08 ENCOUNTER — RESULTS ENCOUNTER (OUTPATIENT)
Dept: FAMILY MEDICINE CLINIC | Facility: CLINIC | Age: 50
End: 2020-11-08

## 2020-11-08 DIAGNOSIS — R53.83 OTHER FATIGUE: ICD-10-CM

## 2020-11-09 ENCOUNTER — LAB (OUTPATIENT)
Dept: LAB | Facility: HOSPITAL | Age: 50
End: 2020-11-09

## 2020-11-09 PROCEDURE — 84403 ASSAY OF TOTAL TESTOSTERONE: CPT | Performed by: NURSE PRACTITIONER

## 2020-11-09 PROCEDURE — 36415 COLL VENOUS BLD VENIPUNCTURE: CPT | Performed by: NURSE PRACTITIONER

## 2020-11-09 PROCEDURE — 84402 ASSAY OF FREE TESTOSTERONE: CPT | Performed by: NURSE PRACTITIONER

## 2020-11-10 LAB
TESTOST FREE SERPL-MCNC: 12.8 PG/ML (ref 6.8–21.5)
TESTOST SERPL-MCNC: 769 NG/DL (ref 264–916)

## 2020-12-02 ENCOUNTER — RESULTS ENCOUNTER (OUTPATIENT)
Dept: FAMILY MEDICINE CLINIC | Facility: CLINIC | Age: 50
End: 2020-12-02

## 2020-12-02 ENCOUNTER — OFFICE VISIT (OUTPATIENT)
Dept: CARDIOLOGY | Facility: CLINIC | Age: 50
End: 2020-12-02

## 2020-12-02 VITALS — WEIGHT: 225 LBS | BODY MASS INDEX: 31.38 KG/M2

## 2020-12-02 DIAGNOSIS — E78.5 DYSLIPIDEMIA: ICD-10-CM

## 2020-12-02 DIAGNOSIS — Z79.899 LONG-TERM USE OF HIGH-RISK MEDICATION: ICD-10-CM

## 2020-12-02 DIAGNOSIS — Z79.899 LONG-TERM USE OF HIGH-RISK MEDICATION: Primary | ICD-10-CM

## 2020-12-02 DIAGNOSIS — I10 ESSENTIAL HYPERTENSION: Primary | ICD-10-CM

## 2020-12-02 PROCEDURE — 99441 PR PHYS/QHP TELEPHONE EVALUATION 5-10 MIN: CPT | Performed by: NURSE PRACTITIONER

## 2020-12-02 NOTE — PROGRESS NOTES
You have chosen to receive care through a telephone visit. Do you consent to use a telephone visit for your medical care today? Yes  Tiera Valenzuela APRN  Jaquan Mckay  1970 12/02/2020    Patient Active Problem List   Diagnosis   • Gastroesophageal reflux disease without esophagitis   • Arthritis   • Hypertension   • Hyperlipidemia   • Anxiety   • Varicocele   • Varicocele present on ultrasound of scrotum   • Obesity (BMI 30.0-34.9)   • Chronic bilateral low back pain with left-sided sciatica   • Seasonal allergic rhinitis due to pollen   • Mild persistent asthma without complication   • Precordial pain   • Dysuria   • Congenital coronary artery anomaly   • BMI 35.0-35.9,adult   • Chondromalacia, knee   • Achilles tendinitis of both lower extremities   • Muscle spasm of both lower legs   • Personal history of allergy to shellfish   • Sensation of cold in lower extremity   • Varicose vein of leg   • Heart palpitations   • Shortness of breath   • Generalized anxiety disorder   • Chronic elbow pain, right   • Chest pain   • Unstable angina (CMS/HCC)   • ASCVD (arteriosclerotic cardiovascular disease)   • Elevated prolactin level   • Other constipation   • Class 1 obesity due to excess calories with serious comorbidity and body mass index (BMI) of 33.0 to 33.9 in adult   • Bacteremia   • Therapeutic opioid-induced constipation (OIC)   • Rectal bleeding   • Bloating   • Generalized abdominal pain   • History of colon polyps   • Lateral epicondylitis of right elbow   • Psychophysiological insomnia   • Chronic rhinitis       Dear Tiera Valenzuela APRN:    Subjective     Chief Complaint   Patient presents with   • Surgical Clearance     knee surgery           History of Present Illness:    Jaquan Mckay is a 50 y.o. male with a past medical history significant for hypertension and dyslipidemia.  He presents today for cardiology follow-up via telephone visit.  I did recommend an in office visit which would be  "scheduled for him promptly.  However, he wanted to proceed with telephone visit today.  He states he has been doing well.  He denies any chest pain, shortness of breath, palpitations, dizziness, near-syncope, or syncope.  He underwent VIJI in 2019 which revealed normal EF with no significant valvular abnormality.  Cardiac catheterization in 2018 revealed mild, nonobstructive coronary artery disease.  He did have an EKG in August 2020 which revealed normal sinus rhythm with sinus arrhythmia.  He has no complaints.  His blood pressure has been well controlled.  Recently at his PCP office BP was 122/70.        Allergies   Allergen Reactions   • Ciprofloxacin Anaphylaxis and Hives   • Mobic [Meloxicam] Other (See Comments)     Pt states, \"It make my feet and hands go numb and I can't hardly walk.\"    • Paxil [Paroxetine Hcl] Shortness Of Breath     Chest pain    • Peanut-Containing Drug Products Anaphylaxis   • Penicillins Anaphylaxis   • Pristiq [Desvenlafaxine Succinate Er] Dizziness   • Sulfa Antibiotics Anaphylaxis, Itching and Rash   • Doxycycline Hives   • Fish-Derived Products Hives   • Isosorbide Nitrate Rash     Rash, hives, had to use inhaler.    • Movantik [Naloxegol] Rash   • Clarithromycin Rash   • Clindamycin/Lincomycin Rash   • Contrast Dye Itching and Rash   • Diltiazem Rash   • Gabapentin Rash   • Keflex [Cephalexin] Rash   • Metoprolol Rash   • Prednisone Rash and Other (See Comments)     Face, feet, and legs go completely numb per patient   • Robitussin Cough+ Chest Max St [Dextromethorphan-Guaifenesin] Itching   • Shrimp (Diagnostic) Rash   • Spironolactone Rash   • Viibryd [Vilazodone Hcl] Itching and Rash   • Zoloft [Sertraline Hcl] Hives and Itching   :      Current Outpatient Medications:   •  albuterol sulfate  (90 Base) MCG/ACT inhaler, Inhale 2 puffs Every 4 (Four) Hours As Needed for Wheezing., Disp: 18 g, Rfl: 5  •  ASPIRIN ADULT LOW STRENGTH 81 MG EC tablet, TAKE 1 TABLET BY MOUTH " DAILY FOR HEART HEALTH AND CIRCULATION, Disp: 30 tablet, Rfl: 3  •  atorvastatin (LIPITOR) 40 MG tablet, Take 1 tablet by mouth Daily., Disp: 90 tablet, Rfl: 3  •  busPIRone (BUSPAR) 15 MG tablet, Take 1 tablet by mouth 3 (Three) Times a Day., Disp: 90 tablet, Rfl: 2  •  dexlansoprazole (Dexilant) 60 MG capsule, Take 1 capsule by mouth Daily., Disp: 30 capsule, Rfl: 5  •  Dilantin 100 MG ER capsule, Take 2 capsules by mouth 2 (Two) Times a Day., Disp: 120 capsule, Rfl: 5  •  diphenhydrAMINE (BENADRYL ALLERGY) 25 MG tablet, Take 1 tablet by mouth Every 6 (Six) Hours As Needed for Itching or Allergies., Disp: 30 tablet, Rfl: 1  •  lisinopril (PRINIVIL,ZESTRIL) 30 MG tablet, Take 1 tablet by mouth Daily., Disp: 30 tablet, Rfl: 5  •  loratadine (CLARITIN) 10 MG tablet, Take 1 tablet by mouth Daily As Needed for Allergies., Disp: 30 tablet, Rfl: 5  •  methocarbamol (ROBAXIN) 750 MG tablet, Take 1 tablet by mouth 2 (Two) Times a Day As Needed for Muscle Spasms., Disp: 60 tablet, Rfl: 5  •  Misc. Devices (BATH/SHOWER SEAT) misc, 1 each Daily., Disp: 1 each, Rfl: 0  •  montelukast (SINGULAIR) 10 MG tablet, Take 1 tablet by mouth Every Night., Disp: 30 tablet, Rfl: 5  •  potassium chloride (K-DUR) 10 MEQ CR tablet, Take 1 tablet by mouth Daily., Disp: 30 tablet, Rfl: 5  •  temazepam (RESTORIL) 15 MG capsule, Take 1 capsule by mouth At Night As Needed for Sleep., Disp: 30 capsule, Rfl: 0  •  vitamin D (ERGOCALCIFEROL) 1.25 MG (35484 UT) capsule capsule, Take 1 capsule by mouth Every 7 (Seven) Days. On Friday, Disp: 5 capsule, Rfl: 5  •  albuterol (PROVENTIL) (2.5 MG/3ML) 0.083% nebulizer solution, Take 2.5 mg by nebulization Every 4 (Four) Hours As Needed for Shortness of Air., Disp: 180 vial, Rfl: 5  •  lactulose (CHRONULAC) 10 GM/15ML solution, Take 30 mL by mouth 2 (Two) Times a Day., Disp: 1892 mL, Rfl: 0  •  mirtazapine (REMERON) 30 MG tablet, Take 0.5-1 tablets by mouth Every Night. 1 tablet 2-3 hours before bedtime,  Disp: 60 tablet, Rfl: 5  •  polyethylene glycol (GoLYTELY) 236 g solution, Starting at 6pm the day before procedure, drink 8 ounces every 30 minutes until all gone or stools are clear. May add flavor packet., Disp: 4000 mL, Rfl: 0      The following portions of the patient's history were reviewed and updated as appropriate: allergies, current medications, past family history, past medical history, past social history, past surgical history and problem list.    Social History     Tobacco Use   • Smoking status: Current Every Day Smoker     Packs/day: 1.50     Years: 17.00     Pack years: 25.50     Types: Cigars, Cigarettes     Start date: 5/5/2010   • Smokeless tobacco: Never Used   • Tobacco comment: still uses 1.5 packs a day.   Substance Use Topics   • Alcohol use: No   • Drug use: No       Review of Systems   Constitution: Negative for decreased appetite and malaise/fatigue.   Cardiovascular: Negative for chest pain, dyspnea on exertion, irregular heartbeat, leg swelling, near-syncope, orthopnea, palpitations, paroxysmal nocturnal dyspnea and syncope.   Respiratory: Negative for cough, shortness of breath and wheezing.    Neurological: Negative for dizziness, light-headedness and weakness.       Objective   Vitals:    12/02/20 1413   Weight: 102 kg (225 lb)     Body mass index is 31.38 kg/m².        Physical Exam    Lab Results   Component Value Date     08/21/2020    K 3.8 08/21/2020     08/21/2020    CO2 24.3 08/21/2020    BUN 15 08/21/2020    CREATININE 0.88 08/21/2020    GLUCOSE 115 (H) 08/21/2020    CALCIUM 8.8 08/21/2020    AST 20 08/21/2020    ALT 22 08/21/2020    ALKPHOS 83 08/21/2020    LABIL2 1.1 (L) 05/29/2016     Lab Results   Component Value Date    CKTOTAL 153 06/20/2019     Lab Results   Component Value Date    WBC 5.14 09/04/2020    HGB 15.5 09/04/2020    HCT 43.3 09/04/2020     09/04/2020     Lab Results   Component Value Date    INR 0.98 09/24/2019    INR 1.03 02/06/2018     INR 0.97 09/26/2017     Lab Results   Component Value Date    MG 1.6 08/21/2020     Lab Results   Component Value Date    TSH 2.490 09/04/2020    PSA 0.8 08/06/2019    CHLPL 232 (H) 04/05/2016    TRIG 162 (H) 06/24/2020    HDL 64 (H) 06/24/2020     (H) 06/24/2020      Lab Results   Component Value Date    BNP 3.0 09/26/2018           Procedures      Assessment/Plan    Diagnosis Plan   1. Essential hypertension     2. Precordial pain     3. Dyslipidemia                  Recommendations:    1.  I will review his plan of care with Dr. Daily in regards to the surgical clearance for the upcoming left knee diagnostic arthroscopy.    2.  He will continue low-dose aspirin, atorvastatin, and lisinopril.    3.  Follow-up in 2 months or sooner if needed.        Return in about 2 months (around 2/2/2021) for Recheck.    This visit has been rescheduled as a phone visit to comply with patient safety concerns in accordance with CDC recommendations. Total time of discussion was 9 minutes.        As always, I appreciate very much the opportunity to participate in the cardiovascular care of your patients.      With Best Regards,    BALDOMERO Horowitz

## 2020-12-03 ENCOUNTER — RESULTS ENCOUNTER (OUTPATIENT)
Dept: FAMILY MEDICINE CLINIC | Facility: CLINIC | Age: 50
End: 2020-12-03

## 2020-12-03 ENCOUNTER — OFFICE VISIT (OUTPATIENT)
Dept: FAMILY MEDICINE CLINIC | Facility: CLINIC | Age: 50
End: 2020-12-03

## 2020-12-03 VITALS
OXYGEN SATURATION: 97 % | WEIGHT: 227 LBS | HEIGHT: 71 IN | BODY MASS INDEX: 31.78 KG/M2 | DIASTOLIC BLOOD PRESSURE: 74 MMHG | HEART RATE: 74 BPM | TEMPERATURE: 97.7 F | SYSTOLIC BLOOD PRESSURE: 126 MMHG

## 2020-12-03 DIAGNOSIS — R30.0 DYSURIA: Primary | ICD-10-CM

## 2020-12-03 DIAGNOSIS — M94.262 CHONDROMALACIA OF LEFT KNEE: ICD-10-CM

## 2020-12-03 DIAGNOSIS — G89.29 CHRONIC BILATERAL LOW BACK PAIN WITH LEFT-SIDED SCIATICA: ICD-10-CM

## 2020-12-03 DIAGNOSIS — F51.04 PSYCHOPHYSIOLOGICAL INSOMNIA: ICD-10-CM

## 2020-12-03 DIAGNOSIS — M54.42 CHRONIC BILATERAL LOW BACK PAIN WITH LEFT-SIDED SCIATICA: ICD-10-CM

## 2020-12-03 LAB
BILIRUB BLD-MCNC: ABNORMAL MG/DL
CLARITY, POC: ABNORMAL
COLOR UR: ABNORMAL
GLUCOSE UR STRIP-MCNC: NEGATIVE MG/DL
KETONES UR QL: NEGATIVE
LEUKOCYTE EST, POC: NEGATIVE
NITRITE UR-MCNC: NEGATIVE MG/ML
PH UR: 6 [PH] (ref 5–8)
PROT UR STRIP-MCNC: ABNORMAL MG/DL
RBC # UR STRIP: NEGATIVE /UL
SP GR UR: 1.03 (ref 1–1.03)
UROBILINOGEN UR QL: NORMAL

## 2020-12-03 PROCEDURE — 99214 OFFICE O/P EST MOD 30 MIN: CPT | Performed by: NURSE PRACTITIONER

## 2020-12-03 RX ORDER — TEMAZEPAM 30 MG/1
30 CAPSULE ORAL NIGHTLY PRN
Qty: 30 CAPSULE | Refills: 0 | Status: SHIPPED | OUTPATIENT
Start: 2020-12-03 | End: 2021-01-04 | Stop reason: SDUPTHER

## 2020-12-03 NOTE — PROGRESS NOTES
"Subjective   Jaquan Mckay is a 50 y.o. male.     Chief Complaint   Patient presents with   • Heartburn       History of Present Illness     GERD-on Dexilant.  No negative side effects.  No negative side effects of medication.  Patient does avoid foods that trigger reflux symptoms.  Denies any recent exacerbations.  Dysuria-reports he feels he has a urine infection.  He has noted some dark urine.  He is not seeing blood in his urine.  He is not dribbling or having to strain to start his stream. No suprapubic tenderness or worsening back pain.   Insomnia-chronic and ongoing.  He reports not much improvement. He reports he cannot tell sleep is improved at all.   He continues to feel anxiety is \"up and down\".    Knee complaint-will be having left knee surgery per [facility].  Probable torn meniscus.  He has \"failed therapy\". And \"had fluid on my knee with bone spurs\".   He will need cardiac clearance first.  He is scheduled for January 11 for surgery.     The following portions of the patient's history were reviewed and updated as appropriate: CC, ROS, allergies, current medications, past family history, past medical history, past social history, past surgical history and problem list.      Review of Systems   Constitutional: Positive for fatigue. Negative for appetite change, unexpected weight gain and unexpected weight loss.   HENT: Negative for congestion, ear pain, postnasal drip, rhinorrhea, sore throat, swollen glands, trouble swallowing and voice change.    Eyes: Negative for pain and visual disturbance.   Respiratory: Positive for shortness of breath (\"worse with this mask\"). Negative for cough, chest tightness and wheezing.    Cardiovascular: Negative for chest pain, palpitations and leg swelling.   Gastrointestinal: Negative for abdominal pain, blood in stool, constipation, diarrhea, nausea and indigestion.   Endocrine: Negative.    Genitourinary: Negative for dysuria, hematuria and urgency.   " "  Musculoskeletal: Positive for arthralgias. Negative for back pain, gait problem, joint swelling and myalgias.   Skin: Negative for color change and skin lesions.   Allergic/Immunologic: Negative.    Neurological: Negative for dizziness, numbness, headache and confusion.   Hematological: Negative.    Psychiatric/Behavioral: Positive for sleep disturbance and stress. Negative for decreased concentration, dysphoric mood and suicidal ideas. The patient is not nervous/anxious.    All other systems reviewed and are negative.      Objective     /74 (BP Location: Left arm, Patient Position: Sitting, Cuff Size: Adult)   Pulse 74   Temp 97.7 °F (36.5 °C) (Temporal)   Ht 180.3 cm (70.98\")   Wt 103 kg (227 lb)   SpO2 97%   BMI 31.67 kg/m²     Physical Exam  Vitals signs reviewed.   Constitutional:       General: He is not in acute distress.     Appearance: He is well-developed. He is not diaphoretic.   HENT:      Head: Normocephalic and atraumatic.      Comments: Oropharynx not examined.  Patient is presently wearing a face covering/mask due to COVID-19 pandemic.     Right Ear: Hearing, tympanic membrane, ear canal and external ear normal.      Left Ear: Hearing, tympanic membrane, ear canal and external ear normal.   Eyes:      General: Lids are normal. No scleral icterus.     Extraocular Movements:      Right eye: Normal extraocular motion and no nystagmus.      Left eye: Normal extraocular motion and no nystagmus.      Conjunctiva/sclera: Conjunctivae normal.      Pupils: Pupils are equal, round, and reactive to light.   Neck:      Musculoskeletal: Normal range of motion and neck supple.      Thyroid: No thyromegaly.      Vascular: No carotid bruit or JVD.      Trachea: No tracheal tenderness.   Cardiovascular:      Rate and Rhythm: Normal rate and regular rhythm.      Heart sounds: Normal heart sounds, S1 normal and S2 normal. No murmur.   Pulmonary:      Effort: Pulmonary effort is normal.      Breath sounds: " Normal breath sounds.   Chest:      Chest wall: No tenderness.   Abdominal:      General: Bowel sounds are normal.      Palpations: Abdomen is soft. There is no hepatomegaly, splenomegaly or mass.      Tenderness: There is no abdominal tenderness.   Musculoskeletal:      Right knee: Tenderness found.      Left knee: He exhibits decreased range of motion. Tenderness found. Lateral joint line tenderness noted.      Lumbar back: He exhibits decreased range of motion.      Right lower leg: No edema.      Left lower leg: No edema.      Comments: Gait antalgic.   equal bilaterally. No muscular atrophy or flaccidity.   Lymphadenopathy:      Cervical: No cervical adenopathy.      Right cervical: No superficial cervical adenopathy.     Left cervical: No superficial cervical adenopathy.   Skin:     General: Skin is warm and dry.      Capillary Refill: Capillary refill takes less than 2 seconds.      Coloration: Skin is not pale.      Findings: No erythema.      Nails: There is no clubbing.        Comments: Multiple BLE varicosities   Neurological:      Mental Status: He is alert and oriented to person, place, and time.      Cranial Nerves: No cranial nerve deficit.      Sensory: No sensory deficit.      Motor: No tremor, atrophy or abnormal muscle tone.      Coordination: Coordination normal.      Deep Tendon Reflexes: Reflexes are normal and symmetric.   Psychiatric:         Attention and Perception: He is attentive.         Mood and Affect: Mood normal.         Speech: Speech normal.         Behavior: Behavior normal.         Thought Content: Thought content normal.         Judgment: Judgment normal.       Assessment/Plan     Problem List Items Addressed This Visit        Nervous and Auditory    Chronic bilateral low back pain with left-sided sciatica    Relevant Medications    methocarbamol (ROBAXIN) 750 MG tablet    Dysuria - Primary    Relevant Orders    POC Urinalysis Dipstick, Automated (Completed)       Other     Psychophysiological insomnia    Relevant Medications    temazepam (RESTORIL) 30 MG capsule    Other Relevant Orders    Urine Drug Screen - Urine, Clean Catch          Patient's Body mass index is 31.67 kg/m². BMI is above normal parameters. Recommendations include: nutrition counseling.       Understands disease processes and need for medications.  Understands reasons for urgent and emergent care.  Patient (& family) verbalized agreement for treatment plan.   Emotional support and active listening provided.  Patient provided time to verbalize feelings.    CHAVEZ reviewed today and consistent.  Will refill prescribed controlled medication today.  Patient is aware they cannot receive narcotics from any other provider except if under care of pain management or speciality clinic.  Risk and benefits of medication use has been reviewed.  History and physical exam exhibit continued safe and appropriate use of controlled substances.  The patient is aware of the potential for addiction and dependence.  This patient has been made aware of the appropriate use of such medications, including potential risk of somnolence, limited ability to drive and / or work safely, and potential for overdose.    It has also been made clear that these medications are for use by this patient only, without concomitant use of alcohol or other substances unless prescribed/advised by medical provider.  Patient understands they may be subject to UDS and pill counts at random.      Patient considered to be low risk for addiction due to use of single controlled medications.  Patient understands and accepts these risks.  Patient need for medication will be reassessed at each visit.  Doses will be adjusted according to patient need and findings.    Goal of TX: Patient will not have any adverse reactions of medication.  Patient will have improvement in sleep hygiene/pattern with use of Restoril as needed as directed.    Medication Dispense  Information    Temazepam   Dispensed: 11/3/2020 12:00 AM   Written:  11/3/2020   Unit strength: 15MG   Days supply: 30   Dispense Note: GivenName=Henrietta=LUISBirthDate=1970Address[address], [place], [place], [address]   Quantity: 30 each   Pharmacy: [facility], [facility]   Authorizing provider: LENNOX ROE   Received from: [facility] (Fill History)   Brand or Generic:       RTC 1 month, sooner if needed.             This document has been electronically signed by:  BALDOMERO Thao, FNP-C    Dragon disclaimer:  Much of this encounter note is an electronic transcription/translation of spoken language to printed text. The electronic translation of spoken language may permit erroneous, or at times, nonsensical words or phrases to be inadvertently transcribed; Although I have reviewed the note for such errors, some may still exist.

## 2020-12-16 ENCOUNTER — APPOINTMENT (OUTPATIENT)
Dept: GENERAL RADIOLOGY | Facility: HOSPITAL | Age: 50
End: 2020-12-16

## 2020-12-16 ENCOUNTER — HOSPITAL ENCOUNTER (EMERGENCY)
Facility: HOSPITAL | Age: 50
Discharge: HOME | End: 2020-12-16
Admitting: STUDENT IN AN ORGANIZED HEALTH CARE EDUCATION/TRAINING PROGRAM

## 2020-12-16 VITALS
OXYGEN SATURATION: 98 % | HEART RATE: 79 BPM | RESPIRATION RATE: 18 BRPM | TEMPERATURE: 98.7 F | DIASTOLIC BLOOD PRESSURE: 85 MMHG | HEIGHT: 67 IN | SYSTOLIC BLOOD PRESSURE: 124 MMHG | BODY MASS INDEX: 34.21 KG/M2 | WEIGHT: 218 LBS

## 2020-12-16 DIAGNOSIS — M79.18 MUSCULOSKELETAL PAIN: Primary | ICD-10-CM
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
## 2020-12-16 PROCEDURE — 72072 X-RAY EXAM THORAC SPINE 3VWS: CPT

## 2020-12-16 PROCEDURE — 73080 X-RAY EXAM OF ELBOW: CPT

## 2020-12-16 PROCEDURE — 99282 EMERGENCY DEPT VISIT SF MDM: CPT

## 2020-12-16 PROCEDURE — 72110 X-RAY EXAM L-2 SPINE 4/>VWS: CPT

## 2020-12-16 RX ORDER — ORPHENADRINE CITRATE 100 MG/1
100 TABLET, EXTENDED RELEASE ORAL 2 TIMES DAILY PRN
Qty: 14 TABLET | Refills: 0 | Status: SHIPPED | OUTPATIENT
Start: 2020-12-16 | End: 2021-01-04

## 2020-12-17 NOTE — ED PROVIDER NOTES
"Subjective   50-year-old male presents to the emergency room after a fall.  Patient states he fell in his house.  He denies head injury or loss of consciousness.  He does complain of left elbow pain and back pain.  Aggravating factors include movement.  Denies any alleviating factors.  Denies any other complaints or concerns.      History provided by:  Patient   used: No        Review of Systems   Constitutional: Negative.  Negative for fever.   HENT: Negative.    Respiratory: Negative.    Cardiovascular: Negative.  Negative for chest pain.   Gastrointestinal: Negative.  Negative for abdominal pain.   Endocrine: Negative.    Genitourinary: Negative.  Negative for dysuria.   Musculoskeletal: Positive for back pain.        (+) left elbow pain   Skin: Negative.    Neurological: Negative.    Psychiatric/Behavioral: Negative.    All other systems reviewed and are negative.      Past Medical History:   Diagnosis Date   • Allergic    • Anxiety    • Arthritis    • Asthma    • Body piercing     REPORTS CYLICONE IN EARS   • Clotting disorder (CMS/HCC) 2004    had a knee surgery   • Coronary artery disease    • Depression    • DVT (deep venous thrombosis) (CMS/[facility])     RIGHT RIGHT KNEE AFTER SURGERY YEARS AGO IN 2001 OR 2004   • Elevated cholesterol    • Gastric ulcer    • GERD (gastroesophageal reflux disease)    • H/O migraine    • Headache    • Heart attack (CMS/HCC)     REPORTS \"LIGHT HEART ATTACK A LONG TIME AGO\"  \"EARLY 90'S\"   • History of seizures     REPORTS LAST EPISODE WAS AROUND 1995.   • Hostility    • Hyperlipidemia    • Hypertension    • Knee pain, acute     Left   • Low back pain    • Lyme disease    • Migraine    • MRSA (methicillin resistant Staphylococcus aureus)     REPORTS LAST TESTED + 2004. WAS TREATED HE REPORTS.  RIGHT ARM, RIGHT KNEE.   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    • Obesity    • Poor historian    • [place] spotted fever    • Seizures (CMS/HCC)    • Sleep apnea    • Tattoo    • " "Wears glasses        Allergies   Allergen Reactions   • Ciprofloxacin Anaphylaxis and Hives   • Mobic [Meloxicam] Other (See Comments)     Pt states, \"It make my feet and hands go numb and I can't hardly walk.\"    • Paxil [Paroxetine Hcl] Shortness Of Breath     Chest pain    • Peanut-Containing Drug Products Anaphylaxis   • Penicillins Anaphylaxis   • Pristiq [Desvenlafaxine Succinate Er] Dizziness   • Sulfa Antibiotics Anaphylaxis, Itching and Rash   • Doxycycline Hives   • Fish-Derived Products Hives   • Isosorbide Nitrate Rash     Rash, hives, had to use inhaler.    • Movantik [Naloxegol] Rash   • Clarithromycin Rash   • Clindamycin/Lincomycin Rash   • Contrast Dye Itching and Rash   • Diltiazem Rash   • Gabapentin Rash   • Keflex [Cephalexin] Rash   • Metoprolol Rash   • Prednisone Rash and Other (See Comments)     Face, feet, and legs go completely numb per patient   • Robitussin Cough+ Chest Max St [Dextromethorphan-Guaifenesin] Itching   • Shrimp (Diagnostic) Rash   • Spironolactone Rash   • Viibryd [Vilazodone Hcl] Itching and Rash   • Zoloft [Sertraline Hcl] Hives and Itching       Past Surgical History:   Procedure Laterality Date   • ABDOMINAL SURGERY     • BACK SURGERY     • BRAIN SURGERY  1986    Tumor removal    • CARDIAC CATHETERIZATION N/A 9/28/2018    Procedure: Left Heart Cath;  Surgeon: Leandro Daily MD;  Location: [facility] CATH INVASIVE LOCATION;  Service: Cardiology   • CHOLECYSTECTOMY     • COLONOSCOPY     • CYST REMOVAL      pilonidal cyst   • ELBOW EPICONDYLECTOMY Right 7/23/2020    Procedure: LATERAL EPICONDYLAR RELEASE;  Surgeon: Jose Ruiz MD;  Location: [facility] OR;  Service: Orthopedics;  Laterality: Right;   • ENDOSCOPY     • FRACTURE SURGERY Right     elbow   • KNEE ARTHROSCOPY Left 10/20/2017    Procedure: Diagnostic arthroscopy left knee with chondroplasty;  Surgeon: Marco Aguirre MD;  Location: [facility];  Service:    • KNEE SURGERY Right    • MOUTH SURGERY      FULL " MOUTH EXTRACTION   • OTHER SURGICAL HISTORY      REPORTS 7 TICKS REMOVED FROM RIGHT ARM IN 2001 OR 2002   • TENNIS ELBOW RELEASE Right 7/23/2020    Procedure: RIGHT TENNIS ELBOW RELEASE;  Surgeon: Jose Ruiz MD;  Location: [facility];  Service: Orthopedics;  Laterality: Right;   • TUMOR EXCISION      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       Family History   Problem Relation Age of Onset   • Diabetes Mother    • Hypertension Mother    • Stroke Mother    • Diabetes Father    • Skin cancer Father    • Hypertension Father    • Heart attack Father    • Diabetes Brother    • Hypertension Brother    • Heart disease Maternal Aunt    • Heart disease Maternal Uncle    • Heart disease Paternal Aunt    • Heart disease Paternal Uncle    • Heart disease Maternal Grandmother    • Heart disease Maternal Grandfather    • Heart disease Paternal Grandmother    • Heart disease Paternal Grandfather        Social History     Socioeconomic History   • Marital status:      Spouse name: Becca   • Number of children: 2   • Years of education: 12   • Highest education level: Not on file   Occupational History   • Occupation: DISABLED   Social Needs   • Financial resource strain: Somewhat hard   • Food insecurity     Worry: Sometimes true     Inability: Sometimes true   • Transportation needs     Medical: No     Non-medical: No   Tobacco Use   • Smoking status: Current Every Day Smoker     Packs/day: 1.50     Years: 17.00     Pack years: 25.50     Types: Cigars, Cigarettes     Start date: 5/5/2010   • Smokeless tobacco: Never Used   • Tobacco comment: still uses 1.5 packs a day.   Substance and Sexual Activity   • Alcohol use: No   • Drug use: No   • Sexual activity: Defer     Partners: Female     Birth control/protection: None   Lifestyle   • Physical activity     Days per week: 0 days     Minutes per session: 0 min   • Stress: To some extent   Relationships   • Social connections     Talks on phone: Once a  week     Gets together: Once a week     Attends [employer] service: Never     Active member of club or organization: No     Attends meetings of clubs or organizations: Never     Relationship status:            Objective   Physical Exam  Vitals signs and nursing note reviewed.   Constitutional:       General: He is not in acute distress.     Appearance: He is well-developed. He is not diaphoretic.   HENT:      Head: Normocephalic and atraumatic.      Right Ear: External ear normal.      Left Ear: External ear normal.      Nose: Nose normal.   Eyes:      Conjunctiva/sclera: Conjunctivae normal.      Pupils: Pupils are equal, round, and reactive to light.   Neck:      Musculoskeletal: Normal range of motion and neck supple.      Vascular: No JVD.      Trachea: No tracheal deviation.   Cardiovascular:      Rate and Rhythm: Normal rate and regular rhythm.      Heart sounds: Normal heart sounds. No murmur.   Pulmonary:      Effort: Pulmonary effort is normal. No respiratory distress.      Breath sounds: Normal breath sounds. No wheezing.   Abdominal:      General: Bowel sounds are normal.      Palpations: Abdomen is soft.      Tenderness: There is no abdominal tenderness.   Musculoskeletal: Normal range of motion.         General: No deformity.      Left elbow: Tenderness found.      Cervical back: Normal.      Thoracic back: He exhibits pain.      Lumbar back: He exhibits pain.   Skin:     General: Skin is warm and dry.      Coloration: Skin is not pale.      Findings: No erythema or rash.   Neurological:      Mental Status: He is alert and oriented to person, place, and time.      Cranial Nerves: No cranial nerve deficit.   Psychiatric:         Behavior: Behavior normal.         Thought Content: Thought content normal.         Procedures           ED Course  ED Course as of Dec 16 2236   Wed Dec 16, 2020   2224 XR Elbow 3+ View Left [TK]   2228 XR Spine Thoracic 3 View [TK]   2228 XR Spine Lumbar Complete 4+VW  [TK]      ED Course User Index  [TK] Alanna Rogers PA-C                                           MDM  Number of Diagnoses or Management Options  Fall, initial encounter: new and does not require workup  Musculoskeletal pain: new and requires workup     Amount and/or Complexity of Data Reviewed  Tests in the radiology section of CPT®: reviewed and ordered    Risk of Complications, Morbidity, and/or Mortality  Presenting problems: moderate  Diagnostic procedures: moderate  Management options: moderate        Final diagnoses:   Musculoskeletal pain   Fall, initial encounter            Alanna Rogers PA-C  12/16/20 2234

## 2021-01-04 ENCOUNTER — OFFICE VISIT (OUTPATIENT)
Dept: FAMILY MEDICINE CLINIC | Facility: CLINIC | Age: 51
End: 2021-01-04

## 2021-01-04 VITALS
WEIGHT: 226.8 LBS | BODY MASS INDEX: 35.6 KG/M2 | SYSTOLIC BLOOD PRESSURE: 122 MMHG | TEMPERATURE: 97.1 F | HEIGHT: 67 IN | OXYGEN SATURATION: 99 % | DIASTOLIC BLOOD PRESSURE: 80 MMHG | HEART RATE: 104 BPM

## 2021-01-04 DIAGNOSIS — J34.89 NASAL SORE: ICD-10-CM

## 2021-01-04 DIAGNOSIS — K21.9 GASTROESOPHAGEAL REFLUX DISEASE WITHOUT ESOPHAGITIS: ICD-10-CM

## 2021-01-04 DIAGNOSIS — F51.04 PSYCHOPHYSIOLOGICAL INSOMNIA: ICD-10-CM

## 2021-01-04 DIAGNOSIS — J45.20 MILD INTERMITTENT ASTHMA WITHOUT COMPLICATION: ICD-10-CM

## 2021-01-04 DIAGNOSIS — F51.01 PRIMARY INSOMNIA: ICD-10-CM

## 2021-01-04 DIAGNOSIS — J30.1 SEASONAL ALLERGIC RHINITIS DUE TO POLLEN: ICD-10-CM

## 2021-01-04 DIAGNOSIS — Z00.00 VISIT FOR ANNUAL HEALTH EXAMINATION: Primary | ICD-10-CM

## 2021-01-04 PROCEDURE — 99396 PREV VISIT EST AGE 40-64: CPT | Performed by: NURSE PRACTITIONER

## 2021-01-04 RX ORDER — ASPIRIN 81 MG/1
81 TABLET ORAL DAILY
Qty: 30 TABLET | Refills: 3 | Status: SHIPPED | OUTPATIENT
Start: 2021-01-04 | End: 2021-06-10 | Stop reason: SDUPTHER

## 2021-01-04 RX ORDER — DEXLANSOPRAZOLE 60 MG/1
60 CAPSULE, DELAYED RELEASE ORAL DAILY
Qty: 30 CAPSULE | Refills: 5 | Status: SHIPPED | OUTPATIENT
Start: 2021-01-04 | End: 2021-05-26 | Stop reason: SDUPTHER

## 2021-01-04 RX ORDER — TEMAZEPAM 30 MG/1
30 CAPSULE ORAL NIGHTLY PRN
Qty: 30 CAPSULE | Refills: 0 | Status: SHIPPED | OUTPATIENT
Start: 2021-01-04 | End: 2021-02-08

## 2021-01-04 RX ORDER — LORATADINE 10 MG/1
10 TABLET ORAL DAILY PRN
Qty: 30 TABLET | Refills: 5 | Status: SHIPPED | OUTPATIENT
Start: 2021-01-04 | End: 2021-06-10 | Stop reason: SDUPTHER

## 2021-01-04 RX ORDER — POTASSIUM CHLORIDE 750 MG/1
10 TABLET, FILM COATED, EXTENDED RELEASE ORAL DAILY
Qty: 30 TABLET | Refills: 5 | Status: SHIPPED | OUTPATIENT
Start: 2021-01-04 | End: 2021-06-10 | Stop reason: SDUPTHER

## 2021-01-04 RX ORDER — MONTELUKAST SODIUM 10 MG/1
10 TABLET ORAL NIGHTLY
Qty: 30 TABLET | Refills: 5 | Status: SHIPPED | OUTPATIENT
Start: 2021-01-04 | End: 2021-06-10 | Stop reason: SDUPTHER

## 2021-01-04 RX ORDER — ERGOCALCIFEROL 1.25 MG/1
50000 CAPSULE ORAL
Qty: 5 CAPSULE | Refills: 5 | Status: SHIPPED | OUTPATIENT
Start: 2021-01-04 | End: 2021-06-10 | Stop reason: SDUPTHER

## 2021-01-04 RX ORDER — ALBUTEROL SULFATE 2.5 MG/3ML
2.5 SOLUTION RESPIRATORY (INHALATION) EVERY 4 HOURS PRN
Qty: 180 VIAL | Refills: 5 | Status: SHIPPED | OUTPATIENT
Start: 2021-01-04 | End: 2022-02-03

## 2021-01-04 RX ORDER — BUSPIRONE HYDROCHLORIDE 15 MG/1
15 TABLET ORAL 3 TIMES DAILY
Qty: 90 TABLET | Refills: 2 | Status: SHIPPED | OUTPATIENT
Start: 2021-01-04 | End: 2021-03-08

## 2021-01-04 NOTE — PROGRESS NOTES
"Subjective   Jaquan Mckay is a 50 y.o. male.     Chief Complaint   Patient presents with   • Wellness Check       History of Present Illness     Annual wellness-patient here for annual physical/wellness exam.  Hypertension-chronic and ongoing.  Patient is under the care of cardiology.  Blood pressure is stable today.  He is presently taking lisinopril 30 mg.  He denies any negative side effects.  Insomnia-patient has been ordered Restoril.  He is presently taking 30 mg.  Today he reports that his sleep is \"better\".  He reports \"9 hours\" at times.  He reports no sensation of drowsiness in the AM when he gets up.    GERD-chronic and ongoing.  Patient is presently taking Dexilant 60 mg.  He denies any negative side effects.  No negative side effects of medication.  Patient does avoid foods that trigger reflux symptoms.  Denies any recent exacerbations.  Asthma-chronic and ongoing.  Patient is presently taking albuterol solution as needed via nebulizer or via HFA.  He is also on montelukast 10 mg.  He has been off allergy shots for > 3 months due to COVID and \"wearing mask over my nose\".   He was taken off Advair after he kept getting recurrent \"blisters in my mouth\".  Before that he was on Symbicort before Advair as his insurance stopped covering it.    Left knee pain-has been to orthopedic.  He will be having further surgery on his knee.   Nasal sore-reports he has noted some sore areas in his nose.  He reports left nare at present but the right nare has \"healed\".  He will be having COVID test due to surgery on January 11.  He request to have a note for Oropharynx swab instead of a Nasopharynx.  His surgery will be at [facility].     The following portions of the patient's history were reviewed and updated as appropriate: CC, ROS, allergies, current medications, past family history, past medical history, past social history, past surgical history and problem list.    Review of Systems   Constitutional: Positive for fatigue. " "Negative for appetite change, unexpected weight gain and unexpected weight loss.   HENT: Negative for congestion, ear pain, postnasal drip, rhinorrhea, sore throat, swollen glands, trouble swallowing and voice change.    Eyes: Negative for pain and visual disturbance.   Respiratory: Negative for cough, chest tightness, shortness of breath and wheezing.    Cardiovascular: Negative for chest pain, palpitations and leg swelling.   Gastrointestinal: Negative for abdominal pain, blood in stool, constipation, diarrhea, nausea and indigestion.   Genitourinary: Negative for dysuria, hematuria and urgency.   Musculoskeletal: Positive for arthralgias, back pain, gait problem, myalgias and neck pain. Negative for joint swelling.   Skin: Negative for color change and skin lesions.   Allergic/Immunologic: Negative.    Neurological: Negative for numbness, headache and confusion.   Hematological: Negative.    Psychiatric/Behavioral: Negative for dysphoric mood, sleep disturbance and suicidal ideas. The patient is not nervous/anxious.    All other systems reviewed and are negative.      Objective     /80   Pulse 104   Temp 97.1 °F (36.2 °C)   Ht 170.2 cm (67.01\")   Wt 103 kg (226 lb 12.8 oz)   SpO2 99%   BMI 35.51 kg/m²     Physical Exam  Vitals signs reviewed.   Constitutional:       General: He is not in acute distress.     Appearance: He is well-developed. He is not diaphoretic.   HENT:      Head: Normocephalic and atraumatic.      Comments: Oropharynx not examined.  Patient is presently wearing a face covering/mask due to COVID-19 pandemic.     Right Ear: Hearing, tympanic membrane, ear canal and external ear normal.      Left Ear: Hearing, tympanic membrane, ear canal and external ear normal.   Eyes:      General: Lids are normal. No scleral icterus.     Extraocular Movements:      Right eye: Normal extraocular motion and no nystagmus.      Left eye: Normal extraocular motion and no nystagmus.      " Conjunctiva/sclera: Conjunctivae normal.      Pupils: Pupils are equal, round, and reactive to light.   Neck:      Musculoskeletal: Normal range of motion and neck supple.      Thyroid: No thyromegaly.      Vascular: No carotid bruit or JVD.      Trachea: No tracheal tenderness.   Cardiovascular:      Rate and Rhythm: Normal rate and regular rhythm.      Heart sounds: Normal heart sounds, S1 normal and S2 normal. No murmur.   Pulmonary:      Effort: Pulmonary effort is normal.      Breath sounds: Normal breath sounds.   Chest:      Chest wall: No tenderness.   Abdominal:      General: Bowel sounds are normal.      Palpations: Abdomen is soft. There is no hepatomegaly, splenomegaly or mass.      Tenderness: There is no abdominal tenderness.   Musculoskeletal:      Right elbow: He exhibits decreased range of motion. Tenderness found.      Lumbar back: He exhibits decreased range of motion, tenderness and spasm.      Right lower leg: No edema.      Left lower leg: No edema.      Comments: Gait slow and steady.   equal bilaterally. No muscular atrophy or flaccidity.   Lymphadenopathy:      Cervical: No cervical adenopathy.      Right cervical: No superficial cervical adenopathy.     Left cervical: No superficial cervical adenopathy.   Skin:     General: Skin is warm and dry.      Capillary Refill: Capillary refill takes less than 2 seconds.      Coloration: Skin is not pale.      Findings: No erythema.      Nails: There is no clubbing.     Neurological:      Mental Status: He is alert and oriented to person, place, and time.      Cranial Nerves: No cranial nerve deficit.      Sensory: No sensory deficit.      Motor: No tremor, atrophy or abnormal muscle tone.      Coordination: Coordination normal.      Deep Tendon Reflexes: Reflexes are normal and symmetric.   Psychiatric:         Attention and Perception: He is attentive.         Mood and Affect: Mood normal.         Speech: Speech normal.         Behavior:  Behavior normal.         Thought Content: Thought content normal.         Judgment: Judgment normal.         Assessment/Plan      Visual Acuity Screening    Right eye Left eye Both eyes   Without correction:      With correction: 20/30 20/30 20/25       PHQ-2 Depression Screening  Little interest or pleasure in doing things? 0   Feeling down, depressed, or hopeless? 0   PHQ-2 Total Score 0           Problem List Items Addressed This Visit        Allergies and Adverse Reactions    Seasonal allergic rhinitis due to pollen    Relevant Medications    diphenhydrAMINE (BENADRYL ALLERGY) 25 MG tablet    loratadine (CLARITIN) 10 MG tablet       Gastrointestinal Abdominal     Gastroesophageal reflux disease without esophagitis    Current Assessment & Plan     Continue Dexilant 60 mg. No negative side effects of medication.  Patient does avoid foods that trigger reflux symptoms.  Denies any recent exacerbations.           Relevant Medications    dexlansoprazole (Dexilant) 60 MG capsule       Sleep    Psychophysiological insomnia    Current Assessment & Plan     Continue restoril 30 mg.  Report any negative side effects.         Relevant Medications    temazepam (RESTORIL) 30 MG capsule      Other Visit Diagnoses     Visit for annual health examination    -  Primary    Nasal sore        Relevant Medications    mupirocin (BACTROBAN) 2 % ointment    Primary insomnia        Relevant Medications    busPIRone (BUSPAR) 15 MG tablet    Mild intermittent asthma without complication        Relevant Medications    montelukast (SINGULAIR) 10 MG tablet    albuterol (PROVENTIL) (2.5 MG/3ML) 0.083% nebulizer solution          Patient's Body mass index is 35.51 kg/m². BMI is above normal parameters. Recommendations include: nutrition counseling.     Jaquan PRADO Nely  reports that he has been smoking cigars and cigarettes. He started smoking about 10 years ago. He has a 25.50 pack-year smoking history. He has never used smokeless tobacco.. I  have educated him on the risk of diseases from using tobacco products such as cancer, COPD and heart disease.     I advised him to quit and he is not willing to quit but has cut down on smoking at this time.  I spent 3  minutes counseling the patient.    Understands disease processes and need for medications.  Understands reasons for urgent and emergent care.  Patient (& family) verbalized agreement for treatment plan.   Emotional support and active listening provided.  Patient provided time to verbalize feelings.    Counseling provided today including importance of good nutrition, exercise as tolerated, dental health, stress reduction and mental health. Importance of immunizations discussed.   Appropriate screenings based on gender (paps, breast exam, mammogram, PSA, colon screens, etc).     Counseled on safe sex practices and STD prevention.   Counseling regarding gun and water safety, domestic violence, and seatbelt use.      Continue under the care of orthopedic surgeon for knee pain.  Refills on routine medication today.    RTC 1 month, sooner if needed for problems or concerns          This document has been electronically signed by:  BALDOMERO Thao, FNP-C    Dragon disclaimer:  Much of this encounter note is an electronic transcription/translation of spoken language to printed text. The electronic translation of spoken language may permit erroneous, or at times, nonsensical words or phrases to be inadvertently transcribed; Although I have reviewed the note for such errors, some may still exist.

## 2021-01-04 NOTE — ASSESSMENT & PLAN NOTE
Continue Dexilant 60 mg. No negative side effects of medication.  Patient does avoid foods that trigger reflux symptoms.  Denies any recent exacerbations.

## 2021-01-04 NOTE — PATIENT INSTRUCTIONS
It is important for your health to eat a healthy balanced diet, to exercise as tolerated, obtain dental and vision checkups routinely, work on stress reduction and be active about taking care of your mental health.  Routine age related immunizations(pneumonia, flu, shingles if applicable) are recommended.  Be active in obtaining age and gender routine maintenance exams (such as paps, breast exams, colonscopy, prostate exams, etc).    You are encouraged to have safe sex practices for STD prevention.    Be alert/educated on gun and water safety, seek help for any domestic violence concerns, and seatbelt use is strongly encouraged.      Health Risks of Smoking  Smoking cigarettes is very bad for your health. Tobacco smoke has over 200 known poisons in it. It contains the poisonous gases nitrogen oxide and carbon monoxide. There are over 60 chemicals in tobacco smoke that cause cancer.  Smoking is difficult to quit because a chemical in tobacco, called nicotine, causes addiction or dependence. When you smoke and inhale, nicotine is absorbed rapidly into the bloodstream through your lungs. Both inhaled and non-inhaled nicotine may be addictive.  What are the risks of cigarette smoke?  Cigarette smokers have an increased risk of many serious medical problems, including:  · Lung cancer.  · Lung disease, such as pneumonia, bronchitis, and emphysema.  · Chest pain (angina) and heart attack because the heart is not getting enough oxygen.  · Heart disease and peripheral blood vessel disease.  · High blood pressure (hypertension).  · Stroke.  · Oral cancer, including cancer of the lip, mouth, or voice box.  · Bladder cancer.  · Pancreatic cancer.  · Cervical cancer.  · Pregnancy complications, including premature birth.  · Stillbirths and smaller  babies, birth defects, and genetic damage to sperm.  · Early menopause.  · Lower estrogen level for women.  · Infertility.  · Facial wrinkles.  · Blindness.  · Increased risk  of broken bones (fractures).  · Senile dementia.  · Stomach ulcers and internal bleeding.  · Delayed wound healing and increased risk of complications during surgery.  · Even smoking lightly shortens your life expectancy by several years.  Because of secondhand smoke exposure, children of smokers have an increased risk of the following:  · Sudden infant death syndrome (SIDS).  · Respiratory infections.  · Lung cancer.  · Heart disease.  · Ear infections.  What are the benefits of quitting?  There are many health benefits of quitting smoking. Here are some of them:  · Within days of quitting smoking, your risk of having a heart attack decreases, your blood flow improves, and your lung capacity improves. Blood pressure, pulse rate, and breathing patterns start returning to normal soon after quitting.  · Within months, your lungs may clear up completely.  · Quitting for 10 years reduces your risk of developing lung cancer and heart disease to almost that of a nonsmoker.  · People who quit may see an improvement in their overall quality of life.  How do I quit smoking?         Smoking is an addiction with both physical and psychological effects, and longtime habits can be hard to change. Your health care provider can recommend:  · Programs and community resources, which may include group support, education, or talk therapy.  · Prescription medicines to help reduce cravings.  · Nicotine replacement products, such as patches, gum, and nasal sprays. Use these products only as directed. Do not replace cigarette smoking with electronic cigarettes, which are commonly called e-cigarettes. The safety of e-cigarettes is not known, and some may contain harmful chemicals.  · A combination of two or more of these methods.  Where to find more information  · American Lung Association: www.lung.org  · American Cancer Society: www.cancer.org  Summary  · Smoking cigarettes is very bad for your health. Cigarette smokers have an increased  risk of many serious medical problems, including several cancers, heart disease, and stroke.  · Smoking is an addiction with both physical and psychological effects, and longtime habits can be hard to change.  · By stopping right away, you can greatly reduce the risk of medical problems for you and your family.  · To help you quit smoking, your health care provider can recommend programs, community resources, prescription medicines, and nicotine replacement products such as patches, gum, and nasal sprays.  This information is not intended to replace advice given to you by your health care provider. Make sure you discuss any questions you have with your health care provider.  Document Revised: 03/21/2019 Document Reviewed: 12/22/2017  [employer] Patient Education © 2020 [employer]  Fall Prevention in the Home, Adult  Falls can cause injuries and can affect people from all age groups. There are many simple things that you can do to make your home safe and to help prevent falls. Ask for help when making these changes, if needed.  What actions can I take to prevent falls?  General instructions  · Use good lighting in all rooms. Replace any light bulbs that burn out.  · Turn on lights if it is dark. Use night-lights.  · Place frequently used items in easy-to-reach places. Lower the shelves around your home if necessary.  · Set up furniture so that there are clear paths around it. Avoid moving your furniture around.  · Remove throw rugs and other tripping hazards from the floor.  · Avoid walking on wet floors.  · Fix any uneven floor surfaces.  · Add color or contrast paint or tape to grab bars and handrails in your home. Place contrasting color strips on the first and last steps of stairways.  · When you use a stepladder, make sure that it is completely opened and that the sides are firmly locked. Have someone hold the ladder while you are using it. Do not climb a closed stepladder.  · Be aware of any and all  pets.  What can I do in the bathroom?         · Keep the floor dry. Immediately clean up any water that spills onto the floor.  · Remove soap buildup in the tub or shower on a regular basis.  · Use non-skid mats or decals on the floor of the tub or shower.  · Attach bath mats securely with double-sided, non-slip rug tape.  · If you need to sit down while you are in the shower, use a plastic, non-slip stool.  · Install grab bars by the toilet and in the tub and shower. Do not use towel bars as grab bars.  What can I do in the bedroom?  · Make sure that a bedside light is easy to reach.  · Do not use oversized bedding that drapes onto the floor.  · Have a firm chair that has side arms to use for getting dressed.  What can I do in the kitchen?  · Clean up any spills right away.  · If you need to reach for something above you, use a sturdy step stool that has a grab bar.  · Keep electrical cables out of the way.  · Do not use floor polish or wax that makes floors slippery. If you must use wax, make sure that it is non-skid floor wax.  What can I do in the stairways?  · Do not leave any items on the stairs.  · Make sure that you have a light switch at the top of the stairs and the bottom of the stairs. Have them installed if you do not have them.  · Make sure that there are handrails on both sides of the stairs. Fix handrails that are broken or loose. Make sure that handrails are as long as the stairways.  · Install non-slip stair treads on all stairs in your home.  · Avoid having throw rugs at the top or bottom of stairways, or secure the rugs with carpet tape to prevent them from moving.  · Choose a carpet design that does not hide the edge of steps on the stairway.  · Check any carpeting to make sure that it is firmly attached to the stairs. Fix any carpet that is loose or worn.  What can I do on the outside of my home?  · Use bright outdoor lighting.  · Regularly repair the edges of walkways and driveways and fix any  cracks.  · Remove high doorway thresholds.  · Trim any shrubbery on the main path into your home.  · Regularly check that handrails are securely fastened and in good repair. Both sides of any steps should have handrails.  · Install guardrails along the edges of any raised decks or porches.  · Clear walkways of debris and clutter, including tools and rocks.  · Have leaves, snow, and ice cleared regularly.  · Use sand or salt on walkways during winter months.  · In the garage, clean up any spills right away, including grease or oil spills.  What other actions can I take?  · Wear closed-toe shoes that fit well and support your feet. Wear shoes that have rubber soles or low heels.  · Use mobility aids as needed, such as canes, walkers, scooters, and crutches.  · Review your medicines with your health care provider. Some medicines can cause dizziness or changes in blood pressure, which increase your risk of falling.  Talk with your health care provider about other ways that you can decrease your risk of falls. This may include working with a physical therapist or  to improve your strength, balance, and endurance.  Where to find more information  · Centers for Disease Control and Prevention, STEAD[employer]: https://www.cdc.gov  · National [employer] Aging: https://az9hbxn.melinda.nih.gov  Contact a health care provider if:  · You are afraid of falling at home.  · You feel weak, drowsy, or dizzy at home.  · You fall at home.  Summary  · There are many simple things that you can do to make your home safe and to help prevent falls.  · Ways to make your home safe include removing tripping hazards and installing grab bars in the bathroom.  · Ask for help when making these changes in your home.  This information is not intended to replace advice given to you by your health care provider. Make sure you discuss any questions you have with your health care provider.  Document Revised: 11/30/2018 Document Reviewed:  08/02/2018  Elsevier Patient Education © 2020 [employer]

## 2021-01-05 ENCOUNTER — TRANSCRIBE ORDERS (OUTPATIENT)
Dept: ADMINISTRATIVE | Facility: HOSPITAL | Age: 51
End: 2021-01-05

## 2021-01-05 DIAGNOSIS — Z01.818 OTHER SPECIFIED PRE-OPERATIVE EXAMINATION: Primary | ICD-10-CM

## 2021-01-06 NOTE — DISCHARGE INSTRUCTIONS
1/11/21   0730  ARRIVAL TIME    TAKE the following medications the morning of surgery:  All heart or blood pressure medications    HOLD all diabetic medications the morning of surgery as ordered by physician.    Please discontinue all blood thinners and anticoagulants (except aspirin) prior to surgery as per your surgeon and cardiologist instructions.  Aspirin may be continued up to the day prior to surgery.     CHLORHEXIDINE CLOTHS GIVEN WITH INSTRUCTIONS AND FORM TO RETURN TO HOSPITAL, IF APPLICABLE.    General Instructions:  · Do not eat or drink after midnight: includes water, mints, or gum. You may brush your teeth.  Dental appliances that are removable must be taken out day of surgery.  · Do not smoke, chew tobacco, or drink alcohol.  · Bring medications in original bottles, any inhalers and if applicable your C-PAP/BI-PAP machine.  · Bring any papers given to you in the doctor's office.  · Wear clean comfortable clothes and socks.  · Do not wear contact lenses or make-up. Bring a case for your glasses if applicable.  · Bring crutches or walker if applicable.  · Leave all other valuables and jewelry at home.    If you were given a blood bank ID arm band remember to bring it with you the day of surgery.    Preventing a Surgical Site Infection:  Shower the night before surgery (unless instructed other wise) using a fresh bar of anti-bacterial soap (such as Dial) and clean washcloth. Dry with a clean towel and dress in clean clothing.  For 2 to 3 days before surgery, avoid shaving with a razor near where you will have surgery because the razor can irritate skin and make it easier to develop an infection. Ask your surgeon if you will be receiving antibiotics prior to surgery.  Make sure you, your family, and all healthcare providers clear their hands with soap and water or an alcohol-based hand  before caring for you or your wound.  If at all possible, quit smoking as many days before surgery as you  can.    Day of surgery:  Upon arrival, a Pre-op nurse and Anesthesiologist will review your health history, obtain vital signs, and answer questions you may have. The only belongings needed at this time will be your home medications and if applicable your C-PAP/BI-PAP machine. If you are staying overnight your family can leave the rest of your belongings in the car and bring them to your room later. A Pre-op nurse will start an IV and you may receive medication in preparation for surgery, including something to help you relax. Your family will be able to see you in the Pre-op area. While you are in surgery your family should notify the waiting room  if they leave the waiting room area and provide a contact phone number.    Please be aware that surgery does come with discomfort. We want to make every effort to control your discomfort so please discuss any uncontrolled symptoms with your nurse. Your doctor will most likely have prescribed pain medications.    If you are going home after surgery you will receive individualized written care instructions before being discharged. A responsible adult must drive you to and from the hospital on the day of surgery and stay with you for 24 hours.    If you are staying overnight following surgery, you will be transported to your hospital room following the recovery period.  [facility] has all private rooms.    If you have any questions please call Pre-Admission Testing at [phone].  Deductibles and co-payments are collected on the day of service. Please be prepared to pay the required co-pay, deductible or deposit on the day of service as defined by your plan.    A RESPONSIBLE PERSON MUST REMAIN IN THE WAITING ROOM DURING YOUR PROCEDURE AND A RESPONSIBLE  MUST BE AVAILABLE UPON YOUR DISCHARGE.

## 2021-01-08 ENCOUNTER — LAB (OUTPATIENT)
Dept: LAB | Facility: HOSPITAL | Age: 51
End: 2021-01-08

## 2021-01-08 ENCOUNTER — HOSPITAL ENCOUNTER (OUTPATIENT)
Dept: GENERAL RADIOLOGY | Facility: HOSPITAL | Age: 51
Discharge: HOME | End: 2021-01-08

## 2021-01-08 ENCOUNTER — APPOINTMENT (OUTPATIENT)
Dept: PREADMISSION TESTING | Facility: HOSPITAL | Age: 51
End: 2021-01-08

## 2021-01-08 DIAGNOSIS — M77.11 LATERAL EPICONDYLITIS OF RIGHT ELBOW: ICD-10-CM

## 2021-01-08 DIAGNOSIS — Z01.818 OTHER SPECIFIED PRE-OPERATIVE EXAMINATION: ICD-10-CM

## 2021-01-08 LAB
ANION GAP SERPL CALCULATED.3IONS-SCNC: 8.5 MMOL/L (ref 5–15)
BUN SERPL-MCNC: 15 MG/DL (ref 6–20)
BUN/CREAT SERPL: 15.8 (ref 7–25)
CALCIUM SPEC-SCNC: 9.2 MG/DL (ref 8.6–10.5)
CHLORIDE SERPL-SCNC: 103 MMOL/L (ref 98–107)
CO2 SERPL-SCNC: 28.5 MMOL/L (ref 22–29)
CREAT SERPL-MCNC: 0.95 MG/DL (ref 0.76–1.27)
DEPRECATED RDW RBC AUTO: 44.4 FL (ref 37–54)
ERYTHROCYTE [DISTWIDTH] IN BLOOD BY AUTOMATED COUNT: 12.5 % (ref 12.3–15.4)
GFR SERPL CREATININE-BSD FRML MDRD: 85 ML/MIN/1.73
GLUCOSE SERPL-MCNC: 104 MG/DL (ref 65–99)
HCT VFR BLD AUTO: 46.9 % (ref 37.5–51)
HGB BLD-MCNC: 16 G/DL (ref 13–17.7)
MCH RBC QN AUTO: 32.7 PG (ref 26.6–33)
MCHC RBC AUTO-ENTMCNC: 34.1 G/DL (ref 31.5–35.7)
MCV RBC AUTO: 95.9 FL (ref 79–97)
PLATELET # BLD AUTO: 199 10*3/MM3 (ref 140–450)
PMV BLD AUTO: 10.1 FL (ref 6–12)
POTASSIUM SERPL-SCNC: 4.6 MMOL/L (ref 3.5–5.2)
RBC # BLD AUTO: 4.89 10*6/MM3 (ref 4.14–5.8)
SARS-COV-2 RNA RESP QL NAA+PROBE: NOT DETECTED
SODIUM SERPL-SCNC: 140 MMOL/L (ref 136–145)
WBC # BLD AUTO: 6.02 10*3/MM3 (ref 3.4–10.8)

## 2021-01-08 PROCEDURE — U0004 COV-19 TEST NON-CDC HGH THRU: HCPCS | Performed by: GENERAL PRACTICE

## 2021-01-08 PROCEDURE — 85027 COMPLETE CBC AUTOMATED: CPT

## 2021-01-08 PROCEDURE — 71046 X-RAY EXAM CHEST 2 VIEWS: CPT | Performed by: RADIOLOGY

## 2021-01-08 PROCEDURE — C9803 HOPD COVID-19 SPEC COLLECT: HCPCS

## 2021-01-08 PROCEDURE — 36415 COLL VENOUS BLD VENIPUNCTURE: CPT

## 2021-01-08 PROCEDURE — 71046 X-RAY EXAM CHEST 2 VIEWS: CPT

## 2021-01-08 PROCEDURE — 80048 BASIC METABOLIC PNL TOTAL CA: CPT

## 2021-01-11 ENCOUNTER — ANESTHESIA EVENT (OUTPATIENT)
Dept: PERIOP | Facility: HOSPITAL | Age: 51
End: 2021-01-11

## 2021-01-11 ENCOUNTER — APPOINTMENT (OUTPATIENT)
Dept: GENERAL RADIOLOGY | Facility: HOSPITAL | Age: 51
End: 2021-01-11

## 2021-01-11 ENCOUNTER — ANESTHESIA (OUTPATIENT)
Dept: PERIOP | Facility: HOSPITAL | Age: 51
End: 2021-01-11

## 2021-01-11 ENCOUNTER — HOSPITAL ENCOUNTER (OUTPATIENT)
Facility: HOSPITAL | Age: 51
Setting detail: HOSPITAL OUTPATIENT SURGERY
Discharge: HOME | End: 2021-01-11
Attending: GENERAL PRACTICE | Admitting: GENERAL PRACTICE

## 2021-01-11 VITALS
RESPIRATION RATE: 18 BRPM | BODY MASS INDEX: 36.41 KG/M2 | OXYGEN SATURATION: 100 % | SYSTOLIC BLOOD PRESSURE: 138 MMHG | TEMPERATURE: 97.5 F | DIASTOLIC BLOOD PRESSURE: 88 MMHG | WEIGHT: 232 LBS | HEIGHT: 67 IN | HEART RATE: 72 BPM

## 2021-01-11 DIAGNOSIS — Z98.890 H/O LEFT KNEE SURGERY: Primary | ICD-10-CM

## 2021-01-11 PROCEDURE — 25010000002 FENTANYL CITRATE (PF) 100 MCG/2ML SOLUTION: Performed by: NURSE ANESTHETIST, CERTIFIED REGISTERED

## 2021-01-11 PROCEDURE — 25010000002 ONDANSETRON PER 1 MG: Performed by: NURSE ANESTHETIST, CERTIFIED REGISTERED

## 2021-01-11 PROCEDURE — 25010000002 PROPOFOL 10 MG/ML EMULSION: Performed by: NURSE ANESTHETIST, CERTIFIED REGISTERED

## 2021-01-11 RX ORDER — ONDANSETRON 2 MG/ML
4 INJECTION INTRAMUSCULAR; INTRAVENOUS AS NEEDED
Status: DISCONTINUED | OUTPATIENT
Start: 2021-01-11 | End: 2021-01-11 | Stop reason: HOSPADM

## 2021-01-11 RX ORDER — SODIUM CHLORIDE, SODIUM LACTATE, POTASSIUM CHLORIDE, CALCIUM CHLORIDE 600; 310; 30; 20 MG/100ML; MG/100ML; MG/100ML; MG/100ML
INJECTION, SOLUTION INTRAVENOUS CONTINUOUS PRN
Status: DISCONTINUED | OUTPATIENT
Start: 2021-01-11 | End: 2021-01-11 | Stop reason: SURG

## 2021-01-11 RX ORDER — MAGNESIUM HYDROXIDE 1200 MG/15ML
LIQUID ORAL AS NEEDED
Status: DISCONTINUED | OUTPATIENT
Start: 2021-01-11 | End: 2021-01-11 | Stop reason: HOSPADM

## 2021-01-11 RX ORDER — ONDANSETRON 4 MG/1
4 TABLET, FILM COATED ORAL EVERY 8 HOURS PRN
Qty: 20 TABLET | Refills: 0 | Status: SHIPPED | OUTPATIENT
Start: 2021-01-11 | End: 2021-04-12 | Stop reason: SDUPTHER

## 2021-01-11 RX ORDER — FENTANYL CITRATE 50 UG/ML
INJECTION, SOLUTION INTRAMUSCULAR; INTRAVENOUS AS NEEDED
Status: DISCONTINUED | OUTPATIENT
Start: 2021-01-11 | End: 2021-01-11 | Stop reason: SURG

## 2021-01-11 RX ORDER — SODIUM CHLORIDE, SODIUM LACTATE, POTASSIUM CHLORIDE, CALCIUM CHLORIDE 600; 310; 30; 20 MG/100ML; MG/100ML; MG/100ML; MG/100ML
100 INJECTION, SOLUTION INTRAVENOUS ONCE AS NEEDED
Status: DISCONTINUED | OUTPATIENT
Start: 2021-01-11 | End: 2021-01-11 | Stop reason: HOSPADM

## 2021-01-11 RX ORDER — SODIUM CHLORIDE 0.9 % (FLUSH) 0.9 %
10 SYRINGE (ML) INJECTION EVERY 12 HOURS SCHEDULED
Status: DISCONTINUED | OUTPATIENT
Start: 2021-01-11 | End: 2021-01-11 | Stop reason: HOSPADM

## 2021-01-11 RX ORDER — DROPERIDOL 2.5 MG/ML
0.62 INJECTION, SOLUTION INTRAMUSCULAR; INTRAVENOUS ONCE AS NEEDED
Status: DISCONTINUED | OUTPATIENT
Start: 2021-01-11 | End: 2021-01-11 | Stop reason: HOSPADM

## 2021-01-11 RX ORDER — MIDAZOLAM HYDROCHLORIDE 1 MG/ML
1 INJECTION INTRAMUSCULAR; INTRAVENOUS
Status: DISCONTINUED | OUTPATIENT
Start: 2021-01-11 | End: 2021-01-11 | Stop reason: HOSPADM

## 2021-01-11 RX ORDER — OXYCODONE HYDROCHLORIDE 5 MG/1
5 TABLET ORAL EVERY 4 HOURS PRN
Qty: 30 TABLET | Refills: 0 | Status: SHIPPED | OUTPATIENT
Start: 2021-01-11 | End: 2021-06-10

## 2021-01-11 RX ORDER — IPRATROPIUM BROMIDE AND ALBUTEROL SULFATE 2.5; .5 MG/3ML; MG/3ML
3 SOLUTION RESPIRATORY (INHALATION) ONCE AS NEEDED
Status: DISCONTINUED | OUTPATIENT
Start: 2021-01-11 | End: 2021-01-11 | Stop reason: HOSPADM

## 2021-01-11 RX ORDER — SODIUM CHLORIDE, SODIUM LACTATE, POTASSIUM CHLORIDE, CALCIUM CHLORIDE 600; 310; 30; 20 MG/100ML; MG/100ML; MG/100ML; MG/100ML
125 INJECTION, SOLUTION INTRAVENOUS ONCE
Status: COMPLETED | OUTPATIENT
Start: 2021-01-11 | End: 2021-01-11

## 2021-01-11 RX ORDER — PROPOFOL 10 MG/ML
VIAL (ML) INTRAVENOUS AS NEEDED
Status: DISCONTINUED | OUTPATIENT
Start: 2021-01-11 | End: 2021-01-11 | Stop reason: SURG

## 2021-01-11 RX ORDER — BUPIVACAINE HYDROCHLORIDE 2.5 MG/ML
INJECTION, SOLUTION EPIDURAL; INFILTRATION; INTRACAUDAL AS NEEDED
Status: DISCONTINUED | OUTPATIENT
Start: 2021-01-11 | End: 2021-01-11 | Stop reason: HOSPADM

## 2021-01-11 RX ORDER — MEPERIDINE HYDROCHLORIDE 25 MG/ML
12.5 INJECTION INTRAMUSCULAR; INTRAVENOUS; SUBCUTANEOUS
Status: DISCONTINUED | OUTPATIENT
Start: 2021-01-11 | End: 2021-01-11 | Stop reason: HOSPADM

## 2021-01-11 RX ORDER — OXYCODONE HYDROCHLORIDE AND ACETAMINOPHEN 5; 325 MG/1; MG/1
1 TABLET ORAL ONCE AS NEEDED
Status: COMPLETED | OUTPATIENT
Start: 2021-01-11 | End: 2021-01-11

## 2021-01-11 RX ORDER — SODIUM CHLORIDE 0.9 % (FLUSH) 0.9 %
10 SYRINGE (ML) INJECTION AS NEEDED
Status: DISCONTINUED | OUTPATIENT
Start: 2021-01-11 | End: 2021-01-11 | Stop reason: HOSPADM

## 2021-01-11 RX ORDER — LIDOCAINE HYDROCHLORIDE AND EPINEPHRINE 10; 10 MG/ML; UG/ML
INJECTION, SOLUTION INFILTRATION; PERINEURAL AS NEEDED
Status: DISCONTINUED | OUTPATIENT
Start: 2021-01-11 | End: 2021-01-11 | Stop reason: HOSPADM

## 2021-01-11 RX ORDER — FENTANYL CITRATE 50 UG/ML
50 INJECTION, SOLUTION INTRAMUSCULAR; INTRAVENOUS
Status: DISCONTINUED | OUTPATIENT
Start: 2021-01-11 | End: 2021-01-11 | Stop reason: HOSPADM

## 2021-01-11 RX ADMIN — FENTANYL CITRATE 50 MCG: 50 INJECTION INTRAMUSCULAR; INTRAVENOUS at 09:53

## 2021-01-11 RX ADMIN — VANCOMYCIN HYDROCHLORIDE 1250 MG: 1 INJECTION, POWDER, LYOPHILIZED, FOR SOLUTION INTRAVENOUS at 08:44

## 2021-01-11 RX ADMIN — SODIUM CHLORIDE, POTASSIUM CHLORIDE, SODIUM LACTATE AND CALCIUM CHLORIDE 125 ML/HR: 600; 310; 30; 20 INJECTION, SOLUTION INTRAVENOUS at 08:04

## 2021-01-11 RX ADMIN — FENTANYL CITRATE 50 MCG: 50 INJECTION INTRAMUSCULAR; INTRAVENOUS at 09:58

## 2021-01-11 RX ADMIN — FENTANYL CITRATE 100 MCG: 50 INJECTION INTRAMUSCULAR; INTRAVENOUS at 08:11

## 2021-01-11 RX ADMIN — PROPOFOL 200 MG: 10 INJECTION, EMULSION INTRAVENOUS at 08:44

## 2021-01-11 RX ADMIN — OXYCODONE HYDROCHLORIDE AND ACETAMINOPHEN 1 TABLET: 5; 325 TABLET ORAL at 10:10

## 2021-01-11 RX ADMIN — ONDANSETRON 4 MG: 2 INJECTION INTRAMUSCULAR; INTRAVENOUS at 09:50

## 2021-01-11 RX ADMIN — SODIUM CHLORIDE, POTASSIUM CHLORIDE, SODIUM LACTATE AND CALCIUM CHLORIDE: 600; 310; 30; 20 INJECTION, SOLUTION INTRAVENOUS at 08:40

## 2021-01-11 NOTE — ANESTHESIA POSTPROCEDURE EVALUATION
Patient: Jaquan Mckay    Procedure Summary     Date: 01/11/21 Room / Location: [facility] OR 03 /  [facility] OR    Anesthesia Start: 0840 Anesthesia Stop: 0929    Procedure: KNEE DIAGNOSTIC ARTHROSCOPY WITH  CHONDROPLASTY patella, femoral and medial (Left Knee) Diagnosis: (M17.12)    Surgeon: Raul Eagle MD Provider: Mohit Stephens MD    Anesthesia Type: general ASA Status: 3          Anesthesia Type: general    Vitals  Vitals Value Taken Time   /79 01/11/21 0959   Temp 97.4 °F (36.3 °C) 01/11/21 0930   Pulse 69 01/11/21 0959   Resp 16 01/11/21 0955   SpO2 99 % 01/11/21 0959           Post Anesthesia Care and Evaluation    Patient location during evaluation: PHASE II  Patient participation: complete - patient participated  Level of consciousness: awake and alert  Pain score: 0  Pain management: adequate  Airway patency: patent  Anesthetic complications: No anesthetic complications    Cardiovascular status: acceptable  Respiratory status: acceptable  Hydration status: acceptable

## 2021-01-11 NOTE — ANESTHESIA PROCEDURE NOTES
Airway  Urgency: elective    Date/Time: 1/11/2021 8:40 AM  Airway not difficult    General Information and Staff    Patient location during procedure: OR  Anesthesiologist: Mohit Stephens MD  CRNA: Roland Causey CRNA    Indications and Patient Condition    Preoxygenated: yes  MILS not maintained throughout  Mask difficulty assessment: 0 - not attempted    Final Airway Details  Final airway type: supraglottic airway      Successful airway: unique  Size 4    Number of attempts at approach: 1  Assessment: lips, teeth, and gum same as pre-op and atraumatic intubation    Additional Comments  Atraumatic LMA placement, dentition unchanged.

## 2021-01-11 NOTE — BRIEF OP NOTE
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  Left knee grade I/II chondromalacia medial compartment  Left knee grade 0 chondromalacia lateral compartment    Procedure/CPT® Codes:  Left knee arthroscopy with chondroplasty of patellofemoral and medial compartments, CPT code 07576      Procedure(s):  KNEE DIAGNOSTIC ARTHROSCOPY WITH  CHONDROPLASTY patella, femoral and medial    Surgeon(s):  Raul Eagle MD    Anesthesia: Choice    Staff:   Circulator: Placido Cedeño RN; Karina Golden RN  Scrub Person: Chiara Westbrook  Assistant: Leonides Abrams  Assistant: Leonides Abrams      Estimated Blood Loss: none    Urine Voided: * No values recorded between 1/11/2021  8:42 AM and 1/11/2021  9:29 AM *    Specimens:                None          Drains: * No LDAs found *    Findings: See operative note    Complications: None apparent    Assistant: Leonides Abrams  was responsible for performing the following activities: Retraction, Suction, Irrigation, Suturing, Closing and Placing Dressing and their skilled assistance was necessary for the success of this case.    Raul Eagle MD     Date: 1/11/2021  Time: 09:31 EST

## 2021-01-11 NOTE — NURSING NOTE
Dr sharif notified of clinamycin allergy. Pt states he has taken vancomycin in the past and did okay with it

## 2021-01-11 NOTE — ANESTHESIA POSTPROCEDURE EVALUATION
Patient: Jaquan Mckay    Procedure Summary     Date: 01/11/21 Room / Location: [facility] OR  / [facility] OR    Anesthesia Start: 0840 Anesthesia Stop: 0929    Procedure: KNEE DIAGNOSTIC ARTHROSCOPY WITH  CHONDROPLASTY patella, femoral and medial (Left Knee) Diagnosis: (M17.12)    Surgeon: Raul Eagle MD Provider: Mohit Stephens MD    Anesthesia Type: general ASA Status: 3          Anesthesia Type: general    Vitals  Vitals Value Taken Time   /87 01/11/21 0935   Temp 97.4 °F (36.3 °C) 01/11/21 0930   Pulse 78 01/11/21 0935   Resp 18 01/11/21 0935   SpO2 100 % 01/11/21 0935           Post Anesthesia Care and Evaluation    Patient location during evaluation: PHASE II  Patient participation: complete - patient participated  Level of consciousness: awake and alert  Pain score: 1  Pain management: adequate  Airway patency: patent  Anesthetic complications: No anesthetic complications  PONV Status: controlled  Cardiovascular status: acceptable  Respiratory status: acceptable  Hydration status: acceptable

## 2021-01-11 NOTE — OP NOTE
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     Implants: None    Operative Indication:     Jaquan Mckay is a 49-year-old male with left knee patellofemoral chondromalacia.  This tried steroid injections over-the-counter medication as well as physical therapy without any symptomatic relief.  Reports that he has some catching locking mainly on the anterior aspect of his knee but this is global as well on the medial and lateral aspects of his knee.  He was advised of the risk benefits alternatives and complications as well as temporary benefit from a procedure such as chondroplasty.  Once understanding the risks he elected to proceed with surgery.    Arthroscopic Findings:    Once the arthroscope was introduced into the knee a thorough diagnostic arthroscopy was performed.  There was no loose body noted.  There was no ACL tear or PCL tear with probing.  There was no medial or lateral meniscus tear with probing.  There is grade III/IV chondromalacia changes of the patellofemoral joint mainly on the trochlea.  There is grade 1/2 chondromalacia of the medial compartment mainly on the condyle.  There is grade 0/1 chondromalacia of the lateral compartment.    Operative Details:    The patient was met in the pre operative suite where the correct operative site was marked. The patient was brought to the operating room where anesthesia was initiated. The  patient was positioned appropriately with all bony prominences well padded. The patient was prepped and draped in the usual sterile fashion and prior to incision a timeout was observed to verify the correct operative site, procedure and antibiotics.    11 blade was utilized to create an anterior lateral portal on the anterior aspect the knee.  The arthroscope was introduced into the suprapatellar pouch where a thorough diagnostic arthroscopy was performed.  The findings are noted above.    The anterior medial portal was then created with 11 blade.  With the use of electrocautery as well as a shaver the medial femoral condyle as well as the patellofemoral joint was debrided of the loose hyaline cartilage that was noted.  A small amount of the anterior fat pad was also debrided.  This was debrided back to stable hyaline edges.  The shaver was placed on the forward direction with debridement of the trochlea only.    Instruments were removed from the knee.  Portal sites were closed with 3-0 nylon.  Local injection was performed.  Soft dressing was applied the patient was extubated transferred to hospital bed in the PACU in stable condition.  The patient tolerated procedure well without any complications.    Post-operative Plan:    Discharge to home today.  Dressing-May remove in 3 to 4 days  Weight Bear/Lifting Status-as tolerated  DVT PPx-Home dose of aspirin he may resume now  Follow up-2 weeks in the office    Electronically Signed by: Raul Eagle MD

## 2021-02-01 ENCOUNTER — TRANSCRIBE ORDERS (OUTPATIENT)
Dept: PHYSICAL THERAPY | Facility: CLINIC | Age: 51
End: 2021-02-01

## 2021-02-01 ENCOUNTER — TREATMENT (OUTPATIENT)
Dept: PHYSICAL THERAPY | Facility: CLINIC | Age: 51
End: 2021-02-01

## 2021-02-01 DIAGNOSIS — Z98.890 S/P ARTHROSCOPIC SURGERY OF LEFT KNEE: Primary | ICD-10-CM

## 2021-02-01 DIAGNOSIS — M25.662 DECREASED ROM OF LEFT KNEE: ICD-10-CM

## 2021-02-01 DIAGNOSIS — Z98.890 S/P LEFT KNEE SURGERY: Primary | ICD-10-CM

## 2021-02-01 DIAGNOSIS — M25.562 ACUTE PAIN OF LEFT KNEE: ICD-10-CM

## 2021-02-01 PROCEDURE — 97162 PT EVAL MOD COMPLEX 30 MIN: CPT | Performed by: PHYSICAL THERAPIST

## 2021-02-01 NOTE — PROGRESS NOTES
Physical Therapy Initial Evaluation and Plan of Care        Patient: Jaquan Mckay   : 1970  Diagnosis/ICD-10 Code:  S/P arthroscopic surgery of left knee [Z98.890]  Referring practitioner: No ref. provider found  Date of Initial Visit: 2021  Today's Date: 2021  Patient seen for 1 sessions    Visit Diagnoses:    ICD-10-CM ICD-9-CM   1. S/P arthroscopic surgery of left knee  Z98.890 V45.89   2. Acute pain of left knee  M25.562 719.46   3. Decreased ROM of left knee  M25.662 719.56            Subjective Questionnaire:       Subjective Evaluation    History of Present Illness  Date of surgery: 2021  Mechanism of injury: Pt has suffered from left knee pain for the past five years.  Pt has received three previous surgeries on his left knee.  He reported the left knee pain has increased in the past six months.  The patient was referred to an orthopedic MD and was scheduled for arthroscopic surgery on 2021.  The patient has now been advised to receive therapy for improved mobility and function.    Quality of life: good    Pain  Current pain ratin  At best pain ratin  At worst pain rating: 10  Location: left general knee  Quality: sharp and knife-like  Relieving factors: medications, heat, ice, rest, support and change in position  Aggravating factors: movement, stairs, standing, ambulation, squatting and repetitive movement  Progression: worsening    Social Support  Patient lives at: 20 stairs.    Hand dominance: right    Patient Goals  Patient goals for therapy: decreased edema, decreased pain, improved balance, increased strength, independence with ADLs/IADLs, return to sport/leisure activities, return to work and increased motion             Objective          Palpation   Left   Tenderness of the distal biceps femoris and lateral gastrocnemius.     Tenderness   Left Knee   Tenderness in the fibular head, inferior fat pad, inferior patella, ITB, lateral joint line, lateral patella,  medial joint line, medial patella, patellar tendon, pes anserinus and popliteal fossa.     Active Range of Motion     Right Knee   Flexion: 122 degrees   Extension: 0 degrees     Additional Active Range of Motion Details  Left knee 7-78 degrees    Patellar Mobility     Additional Patellar Mobility Details  NT due to guarding    Ambulation     Comments   Pt amb with fwd flexed posture with decreased stance on left with sc          Assessment & Plan     Assessment  Impairments: abnormal gait, abnormal muscle firing, abnormal or restricted ROM, activity intolerance, impaired balance, impaired physical strength, lacks appropriate home exercise program and pain with function  Assessment details: Pt is a 51 y/o male referred to therapy for treatment of left knee pain following arthroscopic surgery.  Pt presents with decreased ROM, impaired gait and noted decreased stability.  Therapy will follow for improved functional mobility and decreased pain.    Prognosis: good  Functional Limitations: carrying objects, walking, uncomfortable because of pain, standing and unable to perform repetitive tasks  Goals  Plan Goals: STG 4 weeks    1 Pt will be instructed in a HEP.  2 Pt will improve his left knee flexion to 100 degrees to improve driving.  3 Pt will report pain no greater than 5/10.    LTG 8 weeks    1 Pt will be able to ambulate with good symmetry and velocity with no AD.  2 Pt will demonstrate 4/5 gross left knee strength.  3 Pt will report pain no greater than 2/10 with increased walking and ADLs at home.    Plan  Therapy options: will be seen for skilled physical therapy services  Planned modality interventions: cryotherapy, electrical stimulation/Russian stimulation, thermotherapy (hydrocollator packs) and ultrasound  Planned therapy interventions: abdominal trunk stabilization, ADL retraining, balance/weight-bearing training, fine motor coordination training, flexibility, functional ROM exercises, gait training, home  exercise program, IADL retraining, joint mobilization, manual therapy, neuromuscular re-education, postural training, soft tissue mobilization, strengthening, stretching, therapeutic activities and transfer training  Duration in visits: 20  Duration in weeks: 8  Treatment plan discussed with: patient  Plan details: Will follow for optimal gains.    Moderate Evaluation  60782  Re-evaluation   29017  Therapeutic exercise  20111  Therapeutic activity    00909  Neuromuscular re-education   42565  Manual therapy   58291  Gait training  33575  Unattended e-stim (Medicaid/Medicare)     Moist heat/cryotherapy 21777   Ultrasound   04195            Visit Diagnoses:    ICD-10-CM ICD-9-CM   1. S/P arthroscopic surgery of left knee  Z98.890 V45.89   2. Acute pain of left knee  M25.562 719.46   3. Decreased ROM of left knee  M25.662 719.56       Timed:  Manual Therapy:         mins  69999;  Therapeutic Exercise:         mins  59643;     Neuromuscular Jazmin:        mins  76267;    Therapeutic Activity:          mins  60429;     Gait Training:           mins  21958;     Ultrasound:          mins  35967;    Electrical Stimulation:         mins  39594 ( );    Untimed:  Electrical Stimulation:         mins  97042 ( );  Mechanical Traction:         mins  00380;     Timed Treatment:      mins   Total Treatment:     45   mins    PT SIGNATURE: Win Rodriguez, PT   DATE TREATMENT INITIATED: 2/1/2021      Initial Certification  Certification Period: 5/2/2021  I certify that the therapy services are furnished while this patient is under my care.  The services outlined above are required by this patient, and will be reviewed every 90 days.     PHYSICIAN:       DATE:     Please sign and return via fax to [phone].. Thank you, [facility].

## 2021-02-04 ENCOUNTER — TREATMENT (OUTPATIENT)
Dept: PHYSICAL THERAPY | Facility: CLINIC | Age: 51
End: 2021-02-04

## 2021-02-04 DIAGNOSIS — Z98.890 S/P ARTHROSCOPIC SURGERY OF LEFT KNEE: ICD-10-CM

## 2021-02-04 DIAGNOSIS — M25.662 DECREASED ROM OF LEFT KNEE: ICD-10-CM

## 2021-02-04 DIAGNOSIS — M25.562 ACUTE PAIN OF LEFT KNEE: Primary | ICD-10-CM

## 2021-02-04 PROCEDURE — 97140 MANUAL THERAPY 1/> REGIONS: CPT | Performed by: PHYSICAL THERAPIST

## 2021-02-04 PROCEDURE — 97110 THERAPEUTIC EXERCISES: CPT | Performed by: PHYSICAL THERAPIST

## 2021-02-04 NOTE — PROGRESS NOTES
Patient: Jaquan Mckay   : 1970  Referring practitioner: Raul Eagle MD  Date of Initial Visit: Type: THERAPY  Noted: 2021  Today's Date: 2021  Patient seen for 2 sessions           Subjective Evaluation    History of Present Illness    Subjective comment: Pt reports having 6/10 pain today.       Objective   See Exercise, Manual, and Modality Logs for complete treatment.       Assessment & Plan     Assessment  Assessment details: Tx today consisted of mh to knee followed by conservative patella mobs, there ex for ROM and stability of knee, stm to left gastroc and knee and ended with ice.  Pt demonstrated good effort today with reports of similar pain following session at 6/10.    Plan  Plan details: Therapy will follow progressing knee stability and decreased pain.        Visit Diagnoses:    ICD-10-CM ICD-9-CM   1. Acute pain of left knee  M25.562 719.46   2. Decreased ROM of left knee  M25.662 719.56   3. S/P arthroscopic surgery of left knee  Z98.890 V45.89       Progress strengthening /stabilization /functional activity           Timed:  Manual Therapy:    15     mins  63991;  Therapeutic Exercise:    26     mins  70644;     Neuromuscular Jazmin:        mins  16249;    Therapeutic Activity:          mins  67331;     Gait Training:           mins  05842;     Ultrasound:          mins  62921;    Electrical Stimulation:         mins  14778 ( );    Untimed:  Electrical Stimulation:         mins  14992 ( );  Mechanical Traction:         mins  24166;     Timed Treatment:   41   mins   Total Treatment:     52   Mins(8 min mh and 3 min ice)  Win Rodriguez, PT  Physical Therapist

## 2021-02-05 ENCOUNTER — TREATMENT (OUTPATIENT)
Dept: PHYSICAL THERAPY | Facility: CLINIC | Age: 51
End: 2021-02-05

## 2021-02-05 DIAGNOSIS — M25.662 DECREASED ROM OF LEFT KNEE: ICD-10-CM

## 2021-02-05 DIAGNOSIS — M25.562 ACUTE PAIN OF LEFT KNEE: Primary | ICD-10-CM

## 2021-02-05 DIAGNOSIS — Z98.890 S/P ARTHROSCOPIC SURGERY OF LEFT KNEE: ICD-10-CM

## 2021-02-05 PROCEDURE — 97140 MANUAL THERAPY 1/> REGIONS: CPT | Performed by: PHYSICAL THERAPIST

## 2021-02-05 PROCEDURE — 97110 THERAPEUTIC EXERCISES: CPT | Performed by: PHYSICAL THERAPIST

## 2021-02-05 NOTE — PROGRESS NOTES
Physical Therapy Daily Treatment Note      Patient: Jaquan Mckay   : 1970  Referring practitioner: Raul Eagle MD  Date of Initial Visit: Type: THERAPY  Noted: 2021  Today's Date: 2021  Patient seen for 3 sessions         Subjective:  Patient states his knee is sore and painful this morning, w/ reports of 8/10 pain prior to tx.  Pt states he had difficulty sleeping due to knee pain.     Objective   See Exercise, Manual, and Modality Logs for complete treatment.       Assessment/Plan:  Patient completed today's session w/ reports of slight decrease in pain following, 7/10.  Treatment initiated w/ therex as listed for improved left knee range of motion, LE strength, and knee stability as per protocol.  Manual STM/ retrograde performed to L) knee to assist w/ edema control and mobility f/b conclusion of cryotherapy.  Pt provided w/ cues as needed for proper form, and improved mechanics.  Pt continues to benefit from therapy services and will be progressed as tolerated.  Continue w/ PT's POC.     Visit Diagnoses:    ICD-10-CM ICD-9-CM   1. Acute pain of left knee  M25.562 719.46   2. Decreased ROM of left knee  M25.662 719.56   3. S/P arthroscopic surgery of left knee  Z98.890 V45.89       Progress per Plan of Care and Progress strengthening /stabilization /functional activity           Timed:  Manual Therapy:   10      mins  11593;  Therapeutic Exercise:    36     mins  53535;     Neuromuscular Jazmin:        mins  73681;    Therapeutic Activity:          mins  45834;     Gait Training:           mins  80742;     Ultrasound:          mins  21285;    Electrical Stimulation:         mins  80851 ( );    Untimed:  Electrical Stimulation:         mins  57482 ( );  Mechanical Traction:         mins  83281;     Timed Treatment:  46    mins   Total Treatment:   49     mins  Leighann Lancaster PTA  Physical Therapist

## 2021-02-08 ENCOUNTER — OFFICE VISIT (OUTPATIENT)
Dept: FAMILY MEDICINE CLINIC | Facility: CLINIC | Age: 51
End: 2021-02-08

## 2021-02-08 ENCOUNTER — TREATMENT (OUTPATIENT)
Dept: PHYSICAL THERAPY | Facility: CLINIC | Age: 51
End: 2021-02-08

## 2021-02-08 VITALS
HEART RATE: 74 BPM | BODY MASS INDEX: 36.57 KG/M2 | DIASTOLIC BLOOD PRESSURE: 80 MMHG | HEIGHT: 67 IN | WEIGHT: 233 LBS | OXYGEN SATURATION: 98 % | SYSTOLIC BLOOD PRESSURE: 130 MMHG | TEMPERATURE: 97.4 F

## 2021-02-08 DIAGNOSIS — Z98.890 S/P ARTHROSCOPIC SURGERY OF LEFT KNEE: ICD-10-CM

## 2021-02-08 DIAGNOSIS — I10 ESSENTIAL HYPERTENSION: Primary | ICD-10-CM

## 2021-02-08 DIAGNOSIS — M25.662 DECREASED ROM OF LEFT KNEE: ICD-10-CM

## 2021-02-08 DIAGNOSIS — M25.562 ACUTE PAIN OF LEFT KNEE: ICD-10-CM

## 2021-02-08 DIAGNOSIS — F51.01 PRIMARY INSOMNIA: ICD-10-CM

## 2021-02-08 DIAGNOSIS — M25.562 ACUTE PAIN OF LEFT KNEE: Primary | ICD-10-CM

## 2021-02-08 DIAGNOSIS — E55.9 VITAMIN D DEFICIENCY: ICD-10-CM

## 2021-02-08 PROCEDURE — 99214 OFFICE O/P EST MOD 30 MIN: CPT | Performed by: NURSE PRACTITIONER

## 2021-02-08 PROCEDURE — 97110 THERAPEUTIC EXERCISES: CPT | Performed by: PHYSICAL THERAPIST

## 2021-02-08 RX ORDER — PHENYTOIN SODIUM 100 MG/1
200 CAPSULE, EXTENDED RELEASE ORAL 2 TIMES DAILY
Qty: 120 CAPSULE | Refills: 5 | Status: SHIPPED | OUTPATIENT
Start: 2021-02-08 | End: 2021-08-10 | Stop reason: SDUPTHER

## 2021-02-08 RX ORDER — LISINOPRIL 30 MG/1
30 TABLET ORAL DAILY
Qty: 30 TABLET | Refills: 5 | Status: SHIPPED | OUTPATIENT
Start: 2021-02-08 | End: 2021-08-10 | Stop reason: SDUPTHER

## 2021-02-08 RX ORDER — ATORVASTATIN CALCIUM 40 MG/1
40 TABLET, FILM COATED ORAL DAILY
Qty: 90 TABLET | Refills: 3 | Status: SHIPPED | OUTPATIENT
Start: 2021-02-08 | End: 2022-02-03

## 2021-02-08 RX ORDER — MIRTAZAPINE 30 MG/1
45 TABLET, FILM COATED ORAL NIGHTLY
Qty: 45 TABLET | Refills: 5 | Status: SHIPPED | OUTPATIENT
Start: 2021-02-08 | End: 2021-08-10 | Stop reason: SDUPTHER

## 2021-02-08 RX ORDER — ALBUTEROL SULFATE 90 UG/1
2 AEROSOL, METERED RESPIRATORY (INHALATION) EVERY 4 HOURS PRN
Qty: 18 G | Refills: 5 | Status: SHIPPED | OUTPATIENT
Start: 2021-02-08 | End: 2021-06-10 | Stop reason: SDUPTHER

## 2021-02-08 NOTE — PROGRESS NOTES
"Subjective   Jaquan Mckay is a 50 y.o. male.     Chief Complaint   Patient presents with   • Knee surgery follow up       History of Present Illness     Knee surgery follow up-had left knee surgery on January 11.  He reports he continues to have popping of his knee and a sensation that his knee cap is moving. He has noted some tenderness that continues over his incision site.   He is wearing knee brace for support.  He will have a follow up \"around the first of march\".  He is having PT 3 times per week.  He reports his pain \"at a 9\" today.  He reports \"cleaned up a lot of arthritis\".   He reports release of \"2 pinched nerves\".    HTN-chronic and ongoing.  Taking lisinopril 30 mg as directed.  No associated symptoms such as CP, SOA, HA, or dizziness.  Insomnia-ongoing.  On Restoril 30 mg.  No negative side effects.   He is also on Remeron 30 mg.  He stopped Restoril as he felt it was not working.  He has not been able to tolerate other antidepressants in the past due to side effects.    Vitamin D deficiency-chronic and ongoing.  Patient is presently taking vitamin D 50,000 units supplement.  No negative side effects of medication are reported.  Vitamin D level is stable with medication use.      The following portions of the patient's history were reviewed and updated as appropriate: CC, ROS, allergies, current medications, past family history, past medical history, past social history, past surgical history and problem list.    Review of Systems   Constitutional: Positive for fatigue (due to insomnia). Negative for activity change, appetite change and fever.   HENT: Negative for congestion, ear pain, facial swelling, sore throat and trouble swallowing.    Eyes: Negative for blurred vision, double vision and redness.   Respiratory: Negative for cough, chest tightness, shortness of breath and wheezing.    Cardiovascular: Positive for chest pain (occasionally). Negative for palpitations and leg swelling.   " "  Gastrointestinal: Negative for blood in stool, constipation, diarrhea and indigestion.   Endocrine: Negative.    Genitourinary: Negative for dysuria, flank pain, frequency, hematuria and urgency.   Musculoskeletal: Positive for arthralgias, gait problem and myalgias.   Skin: Negative.    Allergic/Immunologic: Negative.    Neurological: Negative for weakness, light-headedness and headache.   Hematological: Negative.    Psychiatric/Behavioral: Positive for sleep disturbance and stress. Negative for dysphoric mood, self-injury, suicidal ideas and depressed mood. The patient is nervous/anxious.    All other systems reviewed and are negative.      Objective     /80   Pulse 74   Temp 97.4 °F (36.3 °C)   Ht 170.2 cm (67.01\")   Wt 106 kg (233 lb)   SpO2 98%   BMI 36.48 kg/m²     Physical Exam  Vitals signs reviewed.   Constitutional:       General: He is not in acute distress.     Appearance: He is well-developed. He is not diaphoretic.   HENT:      Head: Normocephalic and atraumatic.      Comments: Oropharynx not examined.  Patient is presently wearing a face covering/mask due to COVID-19 pandemic.     Right Ear: Hearing, tympanic membrane, ear canal and external ear normal.      Left Ear: Hearing, tympanic membrane, ear canal and external ear normal.   Eyes:      General: Lids are normal. No scleral icterus.     Extraocular Movements:      Right eye: Normal extraocular motion and no nystagmus.      Left eye: Normal extraocular motion and no nystagmus.      Conjunctiva/sclera: Conjunctivae normal.      Pupils: Pupils are equal, round, and reactive to light.   Neck:      Musculoskeletal: Normal range of motion and neck supple.      Thyroid: No thyromegaly.      Vascular: No carotid bruit or JVD.      Trachea: No tracheal tenderness.   Cardiovascular:      Rate and Rhythm: Normal rate and regular rhythm.      Heart sounds: Normal heart sounds, S1 normal and S2 normal. No murmur.   Pulmonary:      Effort: " Pulmonary effort is normal.      Breath sounds: Normal breath sounds.   Chest:      Chest wall: No tenderness.   Abdominal:      General: Bowel sounds are normal.      Palpations: Abdomen is soft. There is no hepatomegaly, splenomegaly or mass.      Tenderness: There is no abdominal tenderness.   Musculoskeletal:      Right elbow: He exhibits decreased range of motion. Tenderness found.      Left knee: He exhibits decreased range of motion and swelling (mild). Tenderness found. Medial joint line and lateral joint line tenderness noted.      Lumbar back: He exhibits decreased range of motion, tenderness and spasm.      Right lower leg: No edema.      Left lower leg: No edema.      Comments: Gait slow and steady.  Antalgia noted.   equal bilaterally. No muscular atrophy or flaccidity.   Lymphadenopathy:      Cervical: No cervical adenopathy.      Right cervical: No superficial cervical adenopathy.     Left cervical: No superficial cervical adenopathy.   Skin:     General: Skin is warm and dry.      Capillary Refill: Capillary refill takes less than 2 seconds.      Coloration: Skin is not pale.      Findings: No erythema.      Nails: There is no clubbing.     Neurological:      Mental Status: He is alert and oriented to person, place, and time.      Cranial Nerves: No cranial nerve deficit.      Sensory: No sensory deficit.      Motor: No tremor, atrophy or abnormal muscle tone.      Coordination: Coordination normal.      Deep Tendon Reflexes: Reflexes are normal and symmetric.   Psychiatric:         Attention and Perception: He is attentive.         Mood and Affect: Mood normal.         Speech: Speech normal.         Behavior: Behavior normal.         Thought Content: Thought content normal.         Judgment: Judgment normal.       Assessment/Plan     Problem List Items Addressed This Visit        Cardiac and Vasculature    Hypertension - Primary    Relevant Medications    lisinopril (PRINIVIL,ZESTRIL) 30 MG  tablet       Endocrine and Metabolic    Vitamin D deficiency    Current Assessment & Plan     Continue vitamin D as directed.  Report any negative side effects.  We will continue to monitor vitamin D labs and adjust supplement if necessary.           Other Visit Diagnoses     Primary insomnia        Relevant Medications    mirtazapine (REMERON) 30 MG tablet    Acute pain of left knee        Acute on chronic.  Continue under the care of orthopedic surgeon.  Continue physical therapy as directed          Patient's Body mass index is 36.48 kg/m². BMI is above normal parameters. Recommendations include: nutrition counseling.       Understands disease processes and need for medications.  Understands reasons for urgent and emergent care.  Patient (& family) verbalized agreement for treatment plan.   Emotional support and active listening provided.  Patient provided time to verbalize feelings.      RTC 1 month, sooner if needed.             This document has been electronically signed by:  BALDOMERO Thao FNP-C Dragon disclaimer:  Much of this encounter note is an electronic transcription/translation of spoken language to printed text. The electronic translation of spoken language may permit erroneous, or at times, nonsensical words or phrases to be inadvertently transcribed; Although I have reviewed the note for such errors, some may still exist.

## 2021-02-08 NOTE — PROGRESS NOTES
"   Physical Therapy Daily Treatment Note      Patient: Jaquan Mckay   : 1970  Referring practitioner: Raul Eagle MD  Date of Initial Visit: Type: THERAPY  Noted: 2021  Today's Date: 2021  Patient seen for 4 sessions           Subjective Evaluation    History of Present Illness    Subjective comment: Patient arrives to therapy with reports of 9/10 pain today.  When asked about HEP compliance, patient notes that he \"performs some, but not all.\"  He notes that he is very sore at his knee cap.Pain  Current pain ratin (pre and post)           Objective   See Exercise, Manual, and Modality Logs for complete treatment.       Assessment & Plan     Assessment  Assessment details: Therapy session consisted of MH and there ex; no adverse reactions were noted with MH.  There ex progressed to include increased repetitions.  Rest breaks were provided as necessary throughout treatment sessions.  Cues were provided throughout session to ensure correct form with exercises.  Patient declined ice at conclusion of therapy session; patient was educated on benefits of cryotherapy, but continued to decline cryotherapy.  He noted no change in pain at conclusion of therapy session.  Patient will continue to be progressed per his tolerance and POC.        Visit Diagnoses:    ICD-10-CM ICD-9-CM   1. Acute pain of left knee  M25.562 719.46   2. Decreased ROM of left knee  M25.662 719.56   3. S/P arthroscopic surgery of left knee  Z98.890 V45.89       Progress per Plan of Care and Progress strengthening /stabilization /functional activity           Timed:  Manual Therapy:         mins  32077;  Therapeutic Exercise:    39    mins  58225;     Neuromuscular Jazmin:        mins  87509;    Therapeutic Activity:          mins  89339;     Gait Training:           mins  32770;     Ultrasound:          mins  59621;    Electrical Stimulation:         mins  67296 ( );    Untimed:  Electrical Stimulation:         mins  39608 " ( );  Mechanical Traction:         mins  53541;   Moist Heat-5 min    Timed Treatment:  39    mins   Total Treatment:     44   mins  Hanna Burks, PT  Physical Therapist

## 2021-02-09 NOTE — ASSESSMENT & PLAN NOTE
Continue vitamin D as directed.  Report any negative side effects.  We will continue to monitor vitamin D labs and adjust supplement if necessary.

## 2021-02-10 ENCOUNTER — TREATMENT (OUTPATIENT)
Dept: PHYSICAL THERAPY | Facility: CLINIC | Age: 51
End: 2021-02-10

## 2021-02-10 DIAGNOSIS — Z98.890 S/P ARTHROSCOPIC SURGERY OF LEFT KNEE: ICD-10-CM

## 2021-02-10 DIAGNOSIS — M25.562 ACUTE PAIN OF LEFT KNEE: Primary | ICD-10-CM

## 2021-02-10 DIAGNOSIS — M25.662 DECREASED ROM OF LEFT KNEE: ICD-10-CM

## 2021-02-10 PROCEDURE — 97110 THERAPEUTIC EXERCISES: CPT | Performed by: PHYSICAL THERAPIST

## 2021-02-10 PROCEDURE — 97140 MANUAL THERAPY 1/> REGIONS: CPT | Performed by: PHYSICAL THERAPIST

## 2021-02-10 NOTE — PROGRESS NOTES
Patient: Jaquan Mckay   : 1970  Referring practitioner: Raul Eagle MD  Date of Initial Visit: Type: THERAPY  Noted: 2021  Today's Date: 2/10/2021  Patient seen for 5 sessions           Subjective Evaluation    History of Present Illness    Subjective comment: Pt reports having  increased pain today 9/10.       Objective   See Exercise, Manual, and Modality Logs for complete treatment.       Assessment & Plan     Assessment  Assessment details: Tx today consisted of mh to knee followed by manual stretching, there ex for knee stability and ended with stm to knee and ice.  Pt responded well to added reps today and reported decreased pain to 7/10 following tx.    Plan  Plan details: Will follow progressing knee stability and decreased pain.        Visit Diagnoses:    ICD-10-CM ICD-9-CM   1. Acute pain of left knee  M25.562 719.46   2. Decreased ROM of left knee  M25.662 719.56   3. S/P arthroscopic surgery of left knee  Z98.890 V45.89       Progress strengthening /stabilization /functional activity           Timed:  Manual Therapy:    15     mins  71340;  Therapeutic Exercise:    28     mins  03366;     Neuromuscular Jazmin:        mins  87513;    Therapeutic Activity:          mins  27651;     Gait Training:           mins  59123;     Ultrasound:          mins  78538;    Electrical Stimulation:         mins  87127 ( );    Untimed:  Electrical Stimulation:         mins  68147 ( );  Mechanical Traction:         mins  16930;     Timed Treatment:   43   mins   Total Treatment:     59   Mins( 10 min mh and 6 min ice)  Win Rodriguez, PT  Physical Therapist

## 2021-02-12 ENCOUNTER — APPOINTMENT (OUTPATIENT)
Dept: GENERAL RADIOLOGY | Facility: HOSPITAL | Age: 51
End: 2021-02-12

## 2021-02-12 ENCOUNTER — TREATMENT (OUTPATIENT)
Dept: PHYSICAL THERAPY | Facility: CLINIC | Age: 51
End: 2021-02-12

## 2021-02-12 ENCOUNTER — HOSPITAL ENCOUNTER (EMERGENCY)
Facility: HOSPITAL | Age: 51
Discharge: HOME | End: 2021-02-12
Attending: EMERGENCY MEDICINE | Admitting: EMERGENCY MEDICINE

## 2021-02-12 VITALS
OXYGEN SATURATION: 100 % | TEMPERATURE: 98 F | RESPIRATION RATE: 18 BRPM | WEIGHT: 230 LBS | HEART RATE: 82 BPM | DIASTOLIC BLOOD PRESSURE: 96 MMHG | BODY MASS INDEX: 36.1 KG/M2 | HEIGHT: 67 IN | SYSTOLIC BLOOD PRESSURE: 151 MMHG

## 2021-02-12 DIAGNOSIS — Z98.890 S/P ARTHROSCOPIC SURGERY OF LEFT KNEE: ICD-10-CM

## 2021-02-12 DIAGNOSIS — S80.02XA CONTUSION OF LEFT KNEE, INITIAL ENCOUNTER: Primary | ICD-10-CM

## 2021-02-12 DIAGNOSIS — M25.662 DECREASED ROM OF LEFT KNEE: ICD-10-CM

## 2021-02-12 DIAGNOSIS — M25.562 ACUTE PAIN OF LEFT KNEE: Primary | ICD-10-CM

## 2021-02-12 PROCEDURE — 73562 X-RAY EXAM OF KNEE 3: CPT | Performed by: RADIOLOGY

## 2021-02-12 PROCEDURE — 97140 MANUAL THERAPY 1/> REGIONS: CPT | Performed by: PHYSICAL THERAPIST

## 2021-02-12 PROCEDURE — 97110 THERAPEUTIC EXERCISES: CPT | Performed by: PHYSICAL THERAPIST

## 2021-02-12 PROCEDURE — 73562 X-RAY EXAM OF KNEE 3: CPT

## 2021-02-12 PROCEDURE — 99283 EMERGENCY DEPT VISIT LOW MDM: CPT

## 2021-02-12 RX ORDER — ACETAMINOPHEN 500 MG
1000 TABLET ORAL ONCE
Status: COMPLETED | OUTPATIENT
Start: 2021-02-12 | End: 2021-02-12

## 2021-02-12 RX ADMIN — ACETAMINOPHEN 1000 MG: 500 TABLET ORAL at 14:27

## 2021-02-12 NOTE — ED NOTES
Escorted patient to results pending area at this time, instructed patient on split flow process. Patient verbalized understanding, advised to notify staff of any further needs.Verbalized understanding.       Collette, Ashley N, RN  02/12/21 8610

## 2021-02-12 NOTE — ED PROVIDER NOTES
"Subjective     History provided by:  Patient  Fall  Mechanism of injury: fall    Injury location:  Leg  Leg injury location:  L knee  Incident location:  Outdoors  Time since incident:  1 hour  Arrived directly from scene: yes    Suspicion of alcohol use: no    Suspicion of drug use: no    Associated symptoms: no abdominal pain and no chest pain    Risk factors comment:  Recent surgery to the left knee      Review of Systems   Constitutional: Negative.  Negative for fever.   HENT: Negative.    Respiratory: Negative.    Cardiovascular: Negative.  Negative for chest pain.   Gastrointestinal: Negative.  Negative for abdominal pain.   Endocrine: Negative.    Genitourinary: Negative.  Negative for dysuria.   Musculoskeletal: Positive for arthralgias.   Skin: Negative.    Neurological: Negative.    Psychiatric/Behavioral: Negative.    All other systems reviewed and are negative.      Past Medical History:   Diagnosis Date   • Allergic    • Anxiety    • Arthritis    • Asthma    • Body piercing     REPORTS CYLICONE IN EARS   • Clotting disorder (CMS/[facility]) 2004    had a knee surgery   • Coronary artery disease    • Depression    • DVT (deep venous thrombosis) (CMS/[facility])     RIGHT RIGHT KNEE AFTER SURGERY YEARS AGO IN 2001 OR 2004   • Elevated cholesterol    • Gastric ulcer    • GERD (gastroesophageal reflux disease)    • H/O migraine    • Headache    • Heart attack (CMS/[facility])     REPORTS \"LIGHT HEART ATTACK A LONG TIME AGO\"  \"EARLY 90'S\"   • History of seizures     REPORTS LAST EPISODE WAS AROUND 1995.   • Hostility    • Hyperlipidemia    • Hypertension    • Knee pain, acute     Left   • Low back pain    • Lyme disease    • Migraine    • MRSA (methicillin resistant Staphylococcus aureus)     REPORTS LAST TESTED + 2004. WAS TREATED HE REPORTS.  RIGHT ARM, RIGHT KNEE.   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)    • Sleep apnea    • Tattoo    • Wears glasses  " "      Allergies   Allergen Reactions   • Ciprofloxacin Anaphylaxis and Hives   • Mobic [Meloxicam] Other (See Comments)     Pt states, \"It make my feet and hands go numb and I can't hardly walk.\"    • Paxil [Paroxetine Hcl] Shortness Of Breath     Chest pain    • Peanut-Containing Drug Products Anaphylaxis   • Penicillins Anaphylaxis   • Pristiq [Desvenlafaxine Succinate Er] Dizziness   • Sulfa Antibiotics Anaphylaxis, Itching and Rash   • Doxycycline Hives   • Fish-Derived Products Hives   • Isosorbide Nitrate Rash     Rash, hives, had to use inhaler.    • Movantik [Naloxegol] Rash   • Clarithromycin Rash   • Clindamycin/Lincomycin Rash   • Codeine Rash   • Contrast Dye Itching and Rash   • Diltiazem Rash   • Gabapentin Rash   • Keflex [Cephalexin] Rash   • Metoprolol Rash   • Prednisone Rash and Other (See Comments)     Face, feet, and legs go completely numb per patient   • Robitussin Cough+ Chest Max St [Dextromethorphan-Guaifenesin] Itching   • Shrimp (Diagnostic) Rash   • Spironolactone Rash   • Viibryd [Vilazodone Hcl] Itching and Rash   • Zoloft [Sertraline Hcl] Hives and Itching       Past Surgical History:   Procedure Laterality Date   • ABDOMINAL SURGERY     • BACK SURGERY     • BRAIN SURGERY  1986    Tumor removal    • CARDIAC CATHETERIZATION N/A 9/28/2018    Procedure: Left Heart Cath;  Surgeon: Leandro Daily MD;  Location: [facility] CATH INVASIVE LOCATION;  Service: Cardiology   • CHOLECYSTECTOMY     • COLONOSCOPY     • CYST REMOVAL      pilonidal cyst   • ELBOW EPICONDYLECTOMY Right 7/23/2020    Procedure: LATERAL EPICONDYLAR RELEASE;  Surgeon: Jose Ruiz MD;  Location: [facility] OR;  Service: Orthopedics;  Laterality: Right;   • ENDOSCOPY     • FRACTURE SURGERY Right     elbow   • KNEE ARTHROSCOPY Left 10/20/2017    Procedure: Diagnostic arthroscopy left knee with chondroplasty;  Surgeon: Marco Aguirre MD;  Location: [facility] OR;  Service:    • KNEE ARTHROSCOPY Left 1/11/2021    Procedure: " KNEE DIAGNOSTIC ARTHROSCOPY WITH  CHONDROPLASTY patella, femoral and medial;  Surgeon: Raul Eagle MD;  Location: [facility] OR;  Service: Orthopedics;  Laterality: Left;   • KNEE SURGERY Right    • MOUTH SURGERY      FULL MOUTH EXTRACTION   • OTHER SURGICAL HISTORY      REPORTS 7 TICKS REMOVED FROM RIGHT ARM IN 2001 OR 2002   • TENNIS ELBOW RELEASE Right 7/23/2020    Procedure: RIGHT TENNIS ELBOW RELEASE;  Surgeon: Jose Ruiz MD;  Location: [facility] OR;  Service: Orthopedics;  Laterality: Right;   • TUMOR EXCISION      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       Family History   Problem Relation Age of Onset   • Diabetes Mother    • Hypertension Mother    • Stroke Mother    • Diabetes Father    • Skin cancer Father    • Hypertension Father    • Heart attack Father    • Diabetes Brother    • Hypertension Brother    • Heart disease Maternal Aunt    • Heart disease Maternal Uncle    • Heart disease Paternal Aunt    • Heart disease Paternal Uncle    • Heart disease Maternal Grandmother    • Heart disease Maternal Grandfather    • Heart disease Paternal Grandmother    • Heart disease Paternal Grandfather        Social History     Socioeconomic History   • Marital status:      Spouse name: Becca   • Number of children: 2   • Years of education: 12   • Highest education level: Not on file   Occupational History   • Occupation: DISABLED   Social Needs   • Financial resource strain: Somewhat hard   • Food insecurity     Worry: Sometimes true     Inability: Sometimes true   • Transportation needs     Medical: No     Non-medical: No   Tobacco Use   • Smoking status: Former Smoker     Packs/day: 1.50     Years: 17.00     Pack years: 25.50     Types: Cigars, Cigarettes     Start date: 5/5/2010   • Smokeless tobacco: Never Used   • Tobacco comment: still uses 1.5 packs a day.  he has not smoked in approx 3 weeks.   Substance and Sexual Activity   • Alcohol use: No   • Drug use: No   • Sexual  activity: Defer     Partners: Female     Birth control/protection: None   Lifestyle   • Physical activity     Days per week: 0 days     Minutes per session: 0 min   • Stress: To some extent   Relationships   • Social connections     Talks on phone: Once a week     Gets together: Once a week     Attends Jainism service: Never     Active member of club or organization: No     Attends meetings of clubs or organizations: Never     Relationship status:            Objective   Physical Exam  Vitals signs and nursing note reviewed.   Constitutional:       General: He is not in acute distress.     Appearance: He is well-developed. He is not diaphoretic.   HENT:      Head: Normocephalic and atraumatic.      Right Ear: External ear normal.      Left Ear: External ear normal.      Nose: Nose normal.   Eyes:      Conjunctiva/sclera: Conjunctivae normal.   Neck:      Musculoskeletal: Normal range of motion and neck supple.      Vascular: No JVD.      Trachea: No tracheal deviation.   Cardiovascular:      Rate and Rhythm: Normal rate and regular rhythm.      Heart sounds: No murmur.   Pulmonary:      Effort: Pulmonary effort is normal. No respiratory distress.      Breath sounds: No wheezing.   Abdominal:      Palpations: Abdomen is soft.      Tenderness: There is no abdominal tenderness.   Musculoskeletal:         General: Tenderness present. No deformity.      Comments: Peripatellar tenderness of the left knee.  There is postsurgical scarring.   Skin:     General: Skin is warm and dry.      Coloration: Skin is not pale.      Findings: No erythema or rash.   Neurological:      Mental Status: He is alert and oriented to person, place, and time.      Cranial Nerves: No cranial nerve deficit.   Psychiatric:         Behavior: Behavior normal.         Thought Content: Thought content normal.         Procedures           ED Course  ED Course as of Feb 12 1510   Fri Feb 12, 2021   1507 XR rad interpreted:  No acute bony  abnormality.     [RB]      ED Course User Index  [RB] Rick Mehta II, PA                                           MDM  Number of Diagnoses or Management Options  Contusion of left knee, initial encounter: new and requires workup     Amount and/or Complexity of Data Reviewed  Tests in the radiology section of CPT®: ordered and reviewed    Risk of Complications, Morbidity, and/or Mortality  Presenting problems: low  Diagnostic procedures: low  Management options: low    Patient Progress  Patient progress: stable      Final diagnoses:   Contusion of left knee, initial encounter            Rick Mehta II, PA  02/12/21 1517

## 2021-02-12 NOTE — PROGRESS NOTES
Patient: Jaquan Mckay   : 1970  Referring practitioner: Raul Eagle MD  Date of Initial Visit: Type: THERAPY  Noted: 2021  Today's Date: 2021  Patient seen for 6 sessions           Subjective Evaluation    History of Present Illness    Subjective comment: Pt reports having 8/10 pain today and reports having a fall earlier today with his left knee striking the concrete while wearing his brace.  Pt reports he is walking well yet has increased left knee soreness with his fall.       Objective   See Exercise, Manual, and Modality Logs for complete treatment.       Assessment & Plan     Assessment  Assessment details: Left knee assessed prior to tx with no increased swelling or bruising noted.  Pt demonstrated good WB with gait and similar ROM as previous sessions.  Pt has demonstrated guarding and ligaments not tested.  Pt asked if he wanted to hold tx and patient asked to receive conservative tx only.  Session today consisted of conservative tx with non weightbearing activities followed by stm to knee.  Pt advised to go to ER post session due to fall and patient agreeable.  Pt reported 8/10 post pain today.    Plan  Plan details: Local ER contacted on patient's recent fall and therapy will await results prior to progression of treatment.        Visit Diagnoses:    ICD-10-CM ICD-9-CM   1. Acute pain of left knee  M25.562 719.46   2. Decreased ROM of left knee  M25.662 719.56   3. S/P arthroscopic surgery of left knee  Z98.890 V45.89       Progress strengthening /stabilization /functional activity           Timed:  Manual Therapy:    12     mins  74710;  Therapeutic Exercise:    28     mins  00888;     Neuromuscular Jazmin:        mins  93297;    Therapeutic Activity:          mins  31550;     Gait Training:           mins  97040;     Ultrasound:          mins  53925;    Electrical Stimulation:         mins  06195 ( );    Untimed:  Electrical Stimulation:         mins  48115 (  );  Mechanical Traction:         mins  88811;     Timed Treatment:   40   mins   Total Treatment:     46   Mins(6min )  Win Rodriguez, PT  Physical Therapist

## 2021-02-17 ENCOUNTER — TELEPHONE (OUTPATIENT)
Dept: FAMILY MEDICINE CLINIC | Facility: CLINIC | Age: 51
End: 2021-02-17

## 2021-02-17 ENCOUNTER — TREATMENT (OUTPATIENT)
Dept: PHYSICAL THERAPY | Facility: CLINIC | Age: 51
End: 2021-02-17

## 2021-02-17 DIAGNOSIS — M25.662 DECREASED ROM OF LEFT KNEE: ICD-10-CM

## 2021-02-17 DIAGNOSIS — M25.562 ACUTE PAIN OF LEFT KNEE: Primary | ICD-10-CM

## 2021-02-17 DIAGNOSIS — Z98.890 S/P ARTHROSCOPIC SURGERY OF LEFT KNEE: ICD-10-CM

## 2021-02-17 PROCEDURE — 97140 MANUAL THERAPY 1/> REGIONS: CPT | Performed by: PHYSICAL THERAPIST

## 2021-02-17 PROCEDURE — 97110 THERAPEUTIC EXERCISES: CPT | Performed by: PHYSICAL THERAPIST

## 2021-02-17 NOTE — PROGRESS NOTES
Physical Therapy Daily Treatment Note      Patient: Jaquan Mckay   : 1970  Referring practitioner: Raul Eagle MD  Date of Initial Visit: Type: THERAPY  Noted: 2021  Today's Date: 2021  Patient seen for 7 sessions         Subjective:  Patient arrives to therapy w/ reports of 8/10 left knee pain.  Pt states he went to the ER on 21, following his PT visit, due to a fall at home.  Pt reports he was evaluated and X-rays were performed.  Pt notes X-Rays were negative and he was cleared by ER physician to continue w/ PT.         Objective   See Exercise, Manual, and Modality Logs for complete treatment.       Assessment/Plan:  Supervising therapist, Win Rodriguez, PT present and aware of pt's subjective reports.  PT advised patient and PTA to perform activities to his tolerance.  Pt verbalized understanding.  Pt received MH to L) knee for pain control f/b therex as listed, and manual rx.  Therex performed w/ continued focus on improved left knee range of motion, LE strength, and stability.  Additional seated strengthening activities to address deficits w/ cues given for proper mechanics and form.  Manual rx performed to left knee to assist w/ edema and improve mobility.  Pt denied cryotherapy at conclusion.  Pt continues to benefit from therapy services and will be progressed as tolerated.  Continue w/ PT's POC.     Visit Diagnoses:    ICD-10-CM ICD-9-CM   1. Acute pain of left knee  M25.562 719.46   2. Decreased ROM of left knee  M25.662 719.56   3. S/P arthroscopic surgery of left knee  Z98.890 V45.89       Progress per Plan of Care and Progress strengthening /stabilization /functional activity           Timed:  Manual Therapy:    10     mins  01832;  Therapeutic Exercise:    40     mins  06562;     Neuromuscular Jazmin:        mins  07689;    Therapeutic Activity:          mins  68997;     Gait Training:           mins  06012;     Ultrasound:          mins  04649;    Electrical Stimulation:          mins  69405 ( );    Untimed:  Electrical Stimulation:         mins  72705 ( );  Mechanical Traction:         mins  25056;     Timed Treatment:  50    mins   Total Treatment:   55     mins  Leighann Lancaster PTA  Physical Therapist

## 2021-02-19 ENCOUNTER — TREATMENT (OUTPATIENT)
Dept: PHYSICAL THERAPY | Facility: CLINIC | Age: 51
End: 2021-02-19

## 2021-02-19 DIAGNOSIS — M25.562 ACUTE PAIN OF LEFT KNEE: Primary | ICD-10-CM

## 2021-02-19 DIAGNOSIS — M25.662 DECREASED ROM OF LEFT KNEE: ICD-10-CM

## 2021-02-19 DIAGNOSIS — Z98.890 S/P ARTHROSCOPIC SURGERY OF LEFT KNEE: ICD-10-CM

## 2021-02-19 PROCEDURE — 97140 MANUAL THERAPY 1/> REGIONS: CPT | Performed by: PHYSICAL THERAPIST

## 2021-02-19 PROCEDURE — 97110 THERAPEUTIC EXERCISES: CPT | Performed by: PHYSICAL THERAPIST

## 2021-02-19 NOTE — PROGRESS NOTES
Patient: Jaquan Mckay   : 1970  Referring practitioner: Raul Eagle MD  Date of Initial Visit: Type: THERAPY  Noted: 2021  Today's Date: 2021  Patient seen for 8 sessions           Subjective Evaluation    History of Present Illness    Subjective comment: Pt reports having 8/10 left knee pain.       Objective   See Exercise, Manual, and Modality Logs for complete treatment.       Assessment & Plan     Assessment  Assessment details: Tx today consisted of mh to knee followed by there ex for improved knee mobility with added LE bike and ended with stm to knee.  Pt responded well to added bike today with reports of pain 7/10 post tx. Pt was noted to have more soreness along left quad and gastroc during manual tx today.    Plan  Plan details: Will follow progressing knee stability and may introduce total gym next session as patient tolerates.        Visit Diagnoses:    ICD-10-CM ICD-9-CM   1. Acute pain of left knee  M25.562 719.46   2. Decreased ROM of left knee  M25.662 719.56   3. S/P arthroscopic surgery of left knee  Z98.890 V45.89       Progress strengthening /stabilization /functional activity           Timed:  Manual Therapy:    10     mins  08423;  Therapeutic Exercise:    31     mins  72658;     Neuromuscular Jazmin:        mins  37751;    Therapeutic Activity:          mins  86918;     Gait Training:           mins  47970;     Ultrasound:          mins  93636;    Electrical Stimulation:         mins  33905 ( );    Untimed:  Electrical Stimulation:         mins  40460 ( );  Mechanical Traction:         mins  07054;     Timed Treatment:   41   mins   Total Treatment:     51   mins  Win Rodriguez, PT  Physical Therapist

## 2021-02-22 ENCOUNTER — TREATMENT (OUTPATIENT)
Dept: PHYSICAL THERAPY | Facility: CLINIC | Age: 51
End: 2021-02-22

## 2021-02-22 DIAGNOSIS — Z98.890 S/P ARTHROSCOPIC SURGERY OF LEFT KNEE: ICD-10-CM

## 2021-02-22 DIAGNOSIS — M25.562 ACUTE PAIN OF LEFT KNEE: Primary | ICD-10-CM

## 2021-02-22 DIAGNOSIS — M25.662 DECREASED ROM OF LEFT KNEE: ICD-10-CM

## 2021-02-22 PROCEDURE — 97110 THERAPEUTIC EXERCISES: CPT | Performed by: PHYSICAL THERAPIST

## 2021-02-22 PROCEDURE — 97140 MANUAL THERAPY 1/> REGIONS: CPT | Performed by: PHYSICAL THERAPIST

## 2021-02-22 NOTE — PROGRESS NOTES
"   Physical Therapy Daily Treatment Note      Patient: Jaquan Mckay   : 1970  Referring practitioner: Raul Eagle MD  Date of Initial Visit: Type: THERAPY  Noted: 2021  Today's Date: 2021  Patient seen for 9 sessions           Subjective Evaluation    History of Present Illness    Subjective comment: Pt reports 8/10 left knee pain prior to today's treatment session. The patient states he has experienced \"popping at the knee cap since surgery\". He states he returns to MD tomorrow, 2021, for a follow-up appointment.Pain  Current pain ratin           Objective   See Exercise, Manual, and Modality Logs for complete treatment.       Assessment & Plan     Assessment  Assessment details: Today's session was initiated with moist heat to the left knee for muscle relaxation, with no skin irritation observed. Therapeutic exercises were performed to address left knee range of motion and strength. The patient was progressed to include increased duration on the lower extremity bicycle, with no increased pain reported. Retrograde massage was performed on the left knee to conclude today's session.        Visit Diagnoses:    ICD-10-CM ICD-9-CM   1. Acute pain of left knee  M25.562 719.46   2. Decreased ROM of left knee  M25.662 719.56   3. S/P arthroscopic surgery of left knee  Z98.890 V45.89       Progress per Plan of Care           Timed:  Manual Therapy:   10    mins  54189;  Therapeutic Exercise:   32      mins  19439;     Neuromuscular Jazmin:        mins  66371;    Therapeutic Activity:          mins  29951;     Gait Training:           mins  82763;     Ultrasound:          mins  46462;    Electrical Stimulation:         mins  54888 ( );    Untimed:  Electrical Stimulation:         mins  44806 ( );  Mechanical Traction:         mins  40896;     Timed Treatment:   42   mins   Total Treatment:     52   mins (10 minutes moist heat)    Ashley Claudene Dalton, PT  Physical " Therapist

## 2021-02-24 ENCOUNTER — TELEPHONE (OUTPATIENT)
Dept: FAMILY MEDICINE CLINIC | Facility: CLINIC | Age: 51
End: 2021-02-24

## 2021-02-24 NOTE — TELEPHONE ENCOUNTER
[facility] [address] - [phone] [facility] [phone] FX    CALLED TO INFORM THAT PATIENT IS NEEDING A PRIOR AUTHORIZATION ON HIS dexlansoprazole (Dexilant) 60 MG capsule OR ANOTHER STOMACH MEDICATION THAT HIS INSURANCE WILL COVER. PLEASE RETURN CALL TO [phone] WITH QUESTIONS.

## 2021-02-26 ENCOUNTER — TREATMENT (OUTPATIENT)
Dept: PHYSICAL THERAPY | Facility: CLINIC | Age: 51
End: 2021-02-26

## 2021-02-26 DIAGNOSIS — M25.662 DECREASED ROM OF LEFT KNEE: ICD-10-CM

## 2021-02-26 DIAGNOSIS — Z98.890 S/P ARTHROSCOPIC SURGERY OF LEFT KNEE: ICD-10-CM

## 2021-02-26 DIAGNOSIS — M25.562 ACUTE PAIN OF LEFT KNEE: Primary | ICD-10-CM

## 2021-02-26 PROCEDURE — 97110 THERAPEUTIC EXERCISES: CPT | Performed by: PHYSICAL THERAPIST

## 2021-02-26 PROCEDURE — 97035 APP MDLTY 1+ULTRASOUND EA 15: CPT | Performed by: PHYSICAL THERAPIST

## 2021-02-26 NOTE — PROGRESS NOTES
Patient: Jaquan Mckay   : 1970  Referring practitioner: Raul Eagle MD  Date of Initial Visit: Type: THERAPY  Noted: 2021  Today's Date: 2021  Patient seen for 10 sessions           Subjective Evaluation    History of Present Illness    Subjective comment: Pt reports having 7/10 pain in his knee today.       Objective   See Exercise, Manual, and Modality Logs for complete treatment.       Assessment & Plan     Assessment  Assessment details: Tx today consisted of mh to knee with there ex for improved mobility and function, neuro activities and ended with US to knee.  Pt responded well to added total gym, and balance activities.  Pt reported having 5/10 post pain.    Plan  Plan details: Will follow progressing knee stability and decreased pain.        Visit Diagnoses:    ICD-10-CM ICD-9-CM   1. Acute pain of left knee  M25.562 719.46   2. Decreased ROM of left knee  M25.662 719.56   3. S/P arthroscopic surgery of left knee  Z98.890 V45.89       Progress strengthening /stabilization /functional activity           Timed:  Manual Therapy:         mins  02738;  Therapeutic Exercise:    33     mins  70771;     Neuromuscular Jazmin:        mins  33120;    Therapeutic Activity:          mins  02758;     Gait Training:           mins  47966;     Ultrasound:     8     mins  31744;    Electrical Stimulation:         mins  73941 ( );    Untimed:  Electrical Stimulation:         mins  45020 ( );  Mechanical Traction:         mins  76469;     Timed Treatment:   41   mins   Total Treatment:     49   Mins(8min )  Win Rodriguez, PT  Physical Therapist

## 2021-03-01 ENCOUNTER — TREATMENT (OUTPATIENT)
Dept: PHYSICAL THERAPY | Facility: CLINIC | Age: 51
End: 2021-03-01

## 2021-03-01 DIAGNOSIS — Z98.890 S/P ARTHROSCOPIC SURGERY OF LEFT KNEE: Primary | ICD-10-CM

## 2021-03-01 PROCEDURE — 97110 THERAPEUTIC EXERCISES: CPT | Performed by: PHYSICAL THERAPIST

## 2021-03-01 NOTE — PROGRESS NOTES
"   Progress Note      Patient: Jaquan Mckay   : 1970  Referring practitioner: Raul Eagle MD  Date of Initial Visit: Type: THERAPY  Noted: 2021  Today's Date: 3/1/2021  Patient seen for 11 sessions           Subjective Questionnaire: LEFS:       Subjective Evaluation    History of Present Illness    Subjective comment: Patient reports he is in the process of moving, resulting in carrying boxes and bags up and down the stairs. He states the movement has \"increased pain\", reporting 8/10 left knee pain prior to the treatment session. He reports he continues to have left knee popping, including going down the stairs this morning.Pain  Current pain ratin           Objective          Active Range of Motion   Left Knee   Flexion: 105 degrees   Extensor lag: 3 degrees     Additional Active Range of Motion Details  Patient lacks 3 degrees from achieving full extension of the left knee.    Strength/Myotome Testing     Left Hip   Planes of Motion   Flexion: 4+    Left Knee   Flexion: 4-  Extension: 4-      See Exercise, Manual, and Modality Logs for complete treatment.       Assessment & Plan     Assessment  Impairments: abnormal gait, abnormal muscle firing, abnormal or restricted ROM, activity intolerance, impaired balance, impaired physical strength, lacks appropriate home exercise program and pain with function  Assessment details: A re-assessment was performed during today's session, with patient achieving 3/6 established physical therapy goals. He demonstrated improved left knee AROM, achieving 3-105 degrees (lacking 3 degrees from achieving full extension). The patient reported  on the LEFS, reporting popping with \"almost everything\". The patient has achieved 3/6 established physical therapy goals. He would benefit from continued skilled physical therapy to further address functional limitations, impairments, and mobility. The patient will be progressed as tolerated for improved functional " independence. Gait training will be performed for improved weight shifting and stance phase.   Today's session included therapeutic exercises for improved left knee AROM and strength. An audible pop was present at the patella with total gym squats. The patient demonstrated impaired balance with tandem stance with ball toss. CGA was provided for improved safety. No skin irritation was observed following moist heat prior to today's session.  Prognosis: good  Functional Limitations: carrying objects, walking, uncomfortable because of pain, standing and unable to perform repetitive tasks  Goals  Plan Goals: STG 4 weeks    1. Pt will be instructed in a HEP.      Met  2. Pt will improve his left knee flexion to 100 degrees to improve driving.      Met: 3-105, lacking 3 degrees from achieving neutral.  3. Pt will report pain no greater than 5/10.      Ongoing: Per pt report, 10/10    LTG 8 weeks    1. Pt will be able to ambulate with good symmetry and velocity with no AD.          Ongoing: Decreased left stance phase and weight shift with ambulation                      without AD.  2. Pt will demonstrate 4/5 gross left knee strength.          Met: 4-/5 left knee flexion and extension  3. Pt will report pain no greater than 2/10 with increased walking and ADLs at home.          Ongoing    Plan  Therapy options: will be seen for skilled physical therapy services  Planned modality interventions: cryotherapy, electrical stimulation/Russian stimulation, thermotherapy (hydrocollator packs), ultrasound and iontophoresis  Planned therapy interventions: abdominal trunk stabilization, ADL retraining, balance/weight-bearing training, fine motor coordination training, flexibility, functional ROM exercises, gait training, home exercise program, IADL retraining, joint mobilization, manual therapy, neuromuscular re-education, postural training, soft tissue mobilization, strengthening, stretching, therapeutic activities and transfer  training  Duration in visits: 20  Duration in weeks: 8  Treatment plan discussed with: patient  Plan details: Will follow for optimal gains.    Moderate Evaluation  53333  Re-evaluation   62014  Therapeutic exercise  96158  Therapeutic activity    53141  Neuromuscular re-education   54953  Manual therapy   31926  Gait training  67269  Unattended e-stim (Medicaid/Medicare)     Moist heat/cryotherapy 01927   Ultrasound   41007            Visit Diagnoses:    ICD-10-CM ICD-9-CM   1. S/P arthroscopic surgery of left knee  Z98.890 V45.89       Progress per Plan of Care           Timed:  Manual Therapy:         mins  19170;  Therapeutic Exercise:    41     mins  25297;     Neuromuscular Jazmin:        mins  57856;    Therapeutic Activity:          mins  98615;     Gait Training:           mins  62231;     Ultrasound:          mins  47150;    Electrical Stimulation:         mins  77224 ( );    Untimed:  Electrical Stimulation:         mins  45163 ( );  Mechanical Traction:         mins  26604;     Timed Treatment:   41   mins   Total Treatment:     51   mins (10 minutes moist heat)      Ashley Claudene Dalton, PT  Physical Therapist

## 2021-03-03 ENCOUNTER — TREATMENT (OUTPATIENT)
Dept: PHYSICAL THERAPY | Facility: CLINIC | Age: 51
End: 2021-03-03

## 2021-03-03 DIAGNOSIS — M25.662 DECREASED ROM OF LEFT KNEE: ICD-10-CM

## 2021-03-03 DIAGNOSIS — M25.562 ACUTE PAIN OF LEFT KNEE: ICD-10-CM

## 2021-03-03 DIAGNOSIS — Z98.890 S/P ARTHROSCOPIC SURGERY OF LEFT KNEE: Primary | ICD-10-CM

## 2021-03-03 PROCEDURE — 97140 MANUAL THERAPY 1/> REGIONS: CPT | Performed by: PHYSICAL THERAPIST

## 2021-03-03 PROCEDURE — 97110 THERAPEUTIC EXERCISES: CPT | Performed by: PHYSICAL THERAPIST

## 2021-03-03 NOTE — PROGRESS NOTES
Physical Therapy Daily Treatment Note      Patient: Jaquan Mckay   : 1970  Referring practitioner: Raul Eagle MD  Date of Initial Visit: Type: THERAPY  Noted: 2021  Today's Date: 3/3/2021  Patient seen for 12 sessions         Subjective:  Patient states his knee is hurting today, w/ reports of 8/10 pain.  Pt states he has been moving apartments, and notes he has been doing a lot of walking.      Objective   See Exercise, Manual, and Modality Logs for complete treatment.       Assessment/Plan:  Patient tolerated treatment fairly well today w/ reports of slight decrease in pain following, 6/10.  Pt received MH to L) knee f/b therex as listed, manual rx, and conclusion of cryotherapy.  Therex completed w/ continued focus on improved left knee range of motion, LE strength, and stability.  Pt provided w/ intermittent cues as needed for proper form and for max benefit w/ activities.  Pt continues to benefit from therapy services and will be progressed as tolerated, per protocol.  Continue w/ PT's POC.     Visit Diagnoses:    ICD-10-CM ICD-9-CM   1. S/P arthroscopic surgery of left knee  Z98.890 V45.89   2. Acute pain of left knee  M25.562 719.46   3. Decreased ROM of left knee  M25.662 719.56       Progress per Plan of Care and Progress strengthening /stabilization /functional activity           Timed:  Manual Therapy:    10     mins  91319;  Therapeutic Exercise:    36     mins  47940;     Neuromuscular Jazmin:        mins  24803;    Therapeutic Activity:          mins  21798;     Gait Training:           mins  49797;     Ultrasound:          mins  40379;    Electrical Stimulation:         mins  79626 ( );    Untimed:  Electrical Stimulation:         mins  18617 ( );  Mechanical Traction:         mins  59366;     Timed Treatment:  46    mins   Total Treatment:    57    mins  Leighann Brown. NEIL Lancaster  Physical Therapist

## 2021-03-05 ENCOUNTER — TREATMENT (OUTPATIENT)
Dept: PHYSICAL THERAPY | Facility: CLINIC | Age: 51
End: 2021-03-05

## 2021-03-05 DIAGNOSIS — M25.562 ACUTE PAIN OF LEFT KNEE: ICD-10-CM

## 2021-03-05 DIAGNOSIS — Z98.890 S/P ARTHROSCOPIC SURGERY OF LEFT KNEE: Primary | ICD-10-CM

## 2021-03-05 DIAGNOSIS — M25.662 DECREASED ROM OF LEFT KNEE: ICD-10-CM

## 2021-03-05 PROCEDURE — 97014 ELECTRIC STIMULATION THERAPY: CPT | Performed by: PHYSICAL THERAPIST

## 2021-03-05 PROCEDURE — 97140 MANUAL THERAPY 1/> REGIONS: CPT | Performed by: PHYSICAL THERAPIST

## 2021-03-05 PROCEDURE — 97110 THERAPEUTIC EXERCISES: CPT | Performed by: PHYSICAL THERAPIST

## 2021-03-05 NOTE — PROGRESS NOTES
Physical Therapy Daily Treatment Note      Patient: Jaquan Mckay   : 1970  Referring practitioner: Raul Eagle MD  Date of Initial Visit: Type: THERAPY  Noted: 2021  Today's Date: 3/5/2021  Patient seen for 13 sessions         Subjective:  Patient states he had difficulty sleeping last night due to knee pain. Pt reports he continues to have cramps w/ 8/10 pain prior to tx.     Objective   See Exercise, Manual, and Modality Logs for complete treatment.       Assessment/Plan: Patient tolerated treatment well today w/ reports of decreased knee pain following, 5/10.  Pt received MH to L) knee f/b manual rx and therex as listed.  Therex progressed w/ additional standing LE strengthening activities added to program.  Pt observed w/ good tolerance and was provided w/ cues as needed for proper mechanics. Pt demonstrated increased muscular tightness along lateral knee and posterior hamstring region.  Cryotherapy applied at conclusion.  Pt continues to benefit from therapy services and will be progressed as tolerated, per protocol.  Continue w/ PT's POC.     Visit Diagnoses:    ICD-10-CM ICD-9-CM   1. S/P arthroscopic surgery of left knee  Z98.890 V45.89   2. Acute pain of left knee  M25.562 719.46   3. Decreased ROM of left knee  M25.662 719.56       Progress per Plan of Care and Progress strengthening /stabilization /functional activity           Timed:  Manual Therapy:    12     mins  97061;  Therapeutic Exercise:    34     mins  64283;     Neuromuscular Jazmin:        mins  52926;    Therapeutic Activity:          mins  98181;     Gait Training:           mins  10255;     Ultrasound:          mins  19826;    Electrical Stimulation:         mins  71445 ( );    Untimed:  Electrical Stimulation:    10     mins  10733 ( );  Mechanical Traction:         mins  28121;     Timed Treatment:  46    mins   Total Treatment:    62    mins  Leighann Lancaster PTA  Physical Therapist                   [employer] Adapta (D) Remote PPM Device Check  AP: 1 % : 5 %  Mode: DDD        Presenting Rhythm: AS/VS, rates 57-75bpm  Heart Rate: Adequate rates per histogram  Sensing: Stable    Pacing Threshold: Stable    Impedance: Stable  Battery Status: 9.5-14 years  Atrial Arrhythmia: None  Ventricular Arrhythmia: None     Care Plan: F/u annual threshold in 3 months, order placed. LM for daughter with results. SILVIA LagunasT

## 2021-03-08 ENCOUNTER — TREATMENT (OUTPATIENT)
Dept: PHYSICAL THERAPY | Facility: CLINIC | Age: 51
End: 2021-03-08

## 2021-03-08 ENCOUNTER — OFFICE VISIT (OUTPATIENT)
Dept: FAMILY MEDICINE CLINIC | Facility: CLINIC | Age: 51
End: 2021-03-08

## 2021-03-08 VITALS
BODY MASS INDEX: 37.48 KG/M2 | HEIGHT: 67 IN | HEART RATE: 84 BPM | OXYGEN SATURATION: 99 % | SYSTOLIC BLOOD PRESSURE: 120 MMHG | TEMPERATURE: 97.7 F | DIASTOLIC BLOOD PRESSURE: 70 MMHG | WEIGHT: 238.8 LBS

## 2021-03-08 DIAGNOSIS — I10 ESSENTIAL HYPERTENSION: Primary | ICD-10-CM

## 2021-03-08 DIAGNOSIS — F41.9 ANXIETY: ICD-10-CM

## 2021-03-08 DIAGNOSIS — Z13.21 ENCOUNTER FOR VITAMIN DEFICIENCY SCREENING: ICD-10-CM

## 2021-03-08 DIAGNOSIS — R53.83 OTHER FATIGUE: ICD-10-CM

## 2021-03-08 DIAGNOSIS — M25.662 DECREASED ROM OF LEFT KNEE: ICD-10-CM

## 2021-03-08 DIAGNOSIS — E78.2 MIXED HYPERLIPIDEMIA: ICD-10-CM

## 2021-03-08 DIAGNOSIS — M25.562 ACUTE PAIN OF LEFT KNEE: ICD-10-CM

## 2021-03-08 DIAGNOSIS — G40.909 SEIZURE DISORDER (HCC): ICD-10-CM

## 2021-03-08 DIAGNOSIS — E16.2 HYPOGLYCEMIA: ICD-10-CM

## 2021-03-08 DIAGNOSIS — E55.9 VITAMIN D DEFICIENCY: ICD-10-CM

## 2021-03-08 DIAGNOSIS — Z98.890 S/P ARTHROSCOPIC SURGERY OF LEFT KNEE: Primary | ICD-10-CM

## 2021-03-08 PROCEDURE — 97140 MANUAL THERAPY 1/> REGIONS: CPT | Performed by: PHYSICAL THERAPIST

## 2021-03-08 PROCEDURE — 99214 OFFICE O/P EST MOD 30 MIN: CPT | Performed by: NURSE PRACTITIONER

## 2021-03-08 PROCEDURE — 97110 THERAPEUTIC EXERCISES: CPT | Performed by: PHYSICAL THERAPIST

## 2021-03-08 NOTE — PROGRESS NOTES
"   Physical Therapy Daily Treatment Note      Patient: Jaquan Mckay   : 1970  Referring practitioner: Raul Eagle MD  Date of Initial Visit: Type: THERAPY  Noted: 2021  Today's Date: 3/8/2021  Patient seen for 14 sessions         Subjective:  Patient reports 7/10 left knee pain prior to tx.  Pt states he continues to have \"popping\" in the knee and plans to return to ortho if it continues.  Pt states he tolerated last session well w/ no complaints.     Objective   See Exercise, Manual, and Modality Logs for complete treatment.       Assessment/Plan:  Patient tolerated treatment well today w/ reports of slight decrease in pain following, 5/10.  MH applied to L) knee f/b therex as listed, and manual rx.  Therex progressed w/ resistance of LE bike increased from LV 2.3 to LV 2.5, and strengthening reps increased by 10.  Pt provided w/ intermittent cues for proper form and improved quad facilitation. Cryotherapy applied at conclusion.  Pt continues to benefit from therapy services and will be progressed as tolerated, per MD orders.  Continue w/ PT's POC.     Visit Diagnoses:    ICD-10-CM ICD-9-CM   1. S/P arthroscopic surgery of left knee  Z98.890 V45.89   2. Acute pain of left knee  M25.562 719.46   3. Decreased ROM of left knee  M25.662 719.56       Progress per Plan of Care and Progress strengthening /stabilization /functional activity           Timed:  Manual Therapy:    10     mins  24348;  Therapeutic Exercise:     39    mins  08799;     Neuromuscular Jazmin:        mins  29550;    Therapeutic Activity:          mins  00755;     Gait Training:           mins  62824;     Ultrasound:          mins  37621;    Electrical Stimulation:         mins  53294 ( );    Untimed:  Electrical Stimulation:         mins  94715 ( );  Mechanical Traction:         mins  31360;     Timed Treatment:  49    mins   Total Treatment:    60    mins  Leighann Lancaster PTA  Physical Therapist                  "

## 2021-03-08 NOTE — PROGRESS NOTES
"Subjective   Jaquan Mckay is a 50 y.o. male.     Chief Complaint   Patient presents with   • Knee Pain       History of Present Illness     Left knee pain-continues to be ongoing.  He continues under the care of Ortho.  He is wearing a brace today but \"need one without metal\".  He continues under care of PT.  He continues to have \"bad spasm\" in the back of my knee.\"  He has some mild knee swelling today.   Anxiety-reports \"had to stop buspar\" as it \"gave me a rash\".  He continues not to sleep well as \"remeron is not helping even with tablet and half\".  He reports \"broke out in a rash\" and \"had sores in my head\".  He is going to comp care for therapy.    Blood in mucus-reports he has noted some blood in mucus.  He reports \"think it is the new apartment.\"  He reports more dryness due to \"electric heat\".  He is off his allergy injections.    Chest pain-none since last appt.  No palpitations.  He is under care of Cardiology.  He is taking Lisinopril 30 mg as directed for his BP.      The following portions of the patient's history were reviewed and updated as appropriate: CC, ROS, allergies, current medications, past family history, past medical history, past social history, past surgical history and problem list.      Review of Systems   Constitutional: Positive for fatigue. Negative for activity change, appetite change and fever.   HENT: Negative for congestion, ear pain, facial swelling, nosebleeds, rhinorrhea, sinus pressure, sore throat and trouble swallowing.    Eyes: Negative for blurred vision, double vision and redness.   Respiratory: Negative for cough, chest tightness, shortness of breath and wheezing.    Cardiovascular: Negative for chest pain, palpitations and leg swelling.   Gastrointestinal: Negative for abdominal pain, blood in stool, constipation and diarrhea.   Endocrine: Negative.    Genitourinary: Negative for dysuria, flank pain, frequency, hematuria and urgency.   Musculoskeletal: Positive for " "arthralgias, back pain, gait problem and myalgias.   Skin: Negative for color change and skin lesions.   Allergic/Immunologic: Negative.    Neurological: Negative for weakness, light-headedness and headache.   Hematological: Negative.    Psychiatric/Behavioral: Positive for dysphoric mood, sleep disturbance and stress. Negative for self-injury and suicidal ideas.   All other systems reviewed and are negative.      Objective     /70   Pulse 84   Temp 97.7 °F (36.5 °C)   Ht 170.2 cm (67.01\")   Wt 108 kg (238 lb 12.8 oz)   SpO2 99%   BMI 37.39 kg/m²     Physical Exam  Vitals reviewed.   Constitutional:       General: He is not in acute distress.     Appearance: He is well-developed. He is obese. He is not diaphoretic.   HENT:      Head: Normocephalic and atraumatic.      Comments: Oropharynx not examined.  Patient is presently wearing a face covering/mask due to COVID-19 pandemic.     Right Ear: Hearing, tympanic membrane, ear canal and external ear normal.      Left Ear: Hearing, tympanic membrane, ear canal and external ear normal.   Eyes:      General: Lids are normal. No scleral icterus.     Extraocular Movements:      Right eye: Normal extraocular motion and no nystagmus.      Left eye: Normal extraocular motion and no nystagmus.      Conjunctiva/sclera: Conjunctivae normal.      Pupils: Pupils are equal, round, and reactive to light.   Neck:      Thyroid: No thyromegaly.      Vascular: No carotid bruit or JVD.      Trachea: No tracheal tenderness.   Cardiovascular:      Rate and Rhythm: Normal rate and regular rhythm.      Heart sounds: Normal heart sounds, S1 normal and S2 normal. No murmur.   Pulmonary:      Effort: Pulmonary effort is normal.      Breath sounds: Normal breath sounds.   Chest:      Chest wall: No tenderness.   Abdominal:      General: Bowel sounds are normal.      Palpations: Abdomen is soft. There is no hepatomegaly, splenomegaly or mass.      Tenderness: There is no abdominal " tenderness.   Musculoskeletal:         General: Swelling (knee-left) present.      Right elbow: Decreased range of motion. Tenderness present.      Cervical back: Normal range of motion and neck supple.      Lumbar back: Spasms and tenderness present. Decreased range of motion.      Left knee: Swelling (mild) present. Decreased range of motion. Tenderness present over the medial joint line and lateral joint line.      Right lower leg: No edema.      Left lower leg: No edema.      Comments: Gait slow and steady.  Antalgia noted.   equal bilaterally. No muscular atrophy or flaccidity.   Lymphadenopathy:      Cervical: No cervical adenopathy.      Right cervical: No superficial cervical adenopathy.     Left cervical: No superficial cervical adenopathy.   Skin:     General: Skin is warm and dry.      Capillary Refill: Capillary refill takes less than 2 seconds.      Coloration: Skin is not pale.      Findings: No erythema.      Nails: There is no clubbing.   Neurological:      Mental Status: He is alert and oriented to person, place, and time. Mental status is at baseline.      Cranial Nerves: No cranial nerve deficit.      Sensory: No sensory deficit.      Motor: No tremor, atrophy or abnormal muscle tone.      Coordination: Coordination normal.      Gait: Gait abnormal.      Deep Tendon Reflexes: Reflexes are normal and symmetric.   Psychiatric:         Attention and Perception: He is attentive.         Mood and Affect: Mood normal.         Speech: Speech normal.         Behavior: Behavior normal.         Thought Content: Thought content normal.         Judgment: Judgment normal.       Assessment/Plan     Problem List Items Addressed This Visit        Cardiac and Vasculature    Hypertension - Primary    Relevant Medications    lisinopril (PRINIVIL,ZESTRIL) 30 MG tablet    Other Relevant Orders    CBC & Differential    Comprehensive Metabolic Panel    Hyperlipidemia    Relevant Medications    atorvastatin  (LIPITOR) 40 MG tablet    Other Relevant Orders    Lipid Panel       Endocrine and Metabolic    Vitamin D deficiency    Relevant Orders    Vitamin D 25 Hydroxy       Mental Health    Anxiety    Relevant Orders    TSH    T4, Free      Other Visit Diagnoses     Other fatigue        Hypoglycemia        Relevant Orders    Hemoglobin A1c    Seizure disorder (CMS/HCC)        Relevant Orders    Phenytoin level, free    Phenytoin level, total    Encounter for vitamin deficiency screening        Relevant Orders    Vitamin B12          Patient's Body mass index is 37.39 kg/m². BMI is above normal parameters. Recommendations include: nutrition counseling.     Understands disease processes and need for medications.  Understands reasons for urgent and emergent care.  Patient (& family) verbalized agreement for treatment plan.   Emotional support and active listening provided.  Patient provided time to verbalize feelings.    Fasting labs ordered.  Patient will do at [facility].  Will call patient with results and make any meds changes PRN    Continue under care of orthopedic and PT.    Will attempt to obtain knee brace and elbow support.     Will plan for trial of different med for insomnia.      RTC 1 month, sooner if needed.           This document has been electronically signed by:  BALDOMERO Thao, FNP-C    Dragon disclaimer:  Much of this encounter note is an electronic transcription/translation of spoken language to printed text. The electronic translation of spoken language may permit erroneous, or at times, nonsensical words or phrases to be inadvertently transcribed; Although I have reviewed the note for such errors, some may still exist.

## 2021-03-09 ENCOUNTER — TREATMENT (OUTPATIENT)
Dept: PHYSICAL THERAPY | Facility: CLINIC | Age: 51
End: 2021-03-09

## 2021-03-09 DIAGNOSIS — M25.662 DECREASED ROM OF LEFT KNEE: ICD-10-CM

## 2021-03-09 DIAGNOSIS — Z98.890 S/P ARTHROSCOPIC SURGERY OF LEFT KNEE: Primary | ICD-10-CM

## 2021-03-09 DIAGNOSIS — M25.562 ACUTE PAIN OF LEFT KNEE: ICD-10-CM

## 2021-03-09 PROCEDURE — 97110 THERAPEUTIC EXERCISES: CPT | Performed by: PHYSICAL THERAPIST

## 2021-03-09 PROCEDURE — 97140 MANUAL THERAPY 1/> REGIONS: CPT | Performed by: PHYSICAL THERAPIST

## 2021-03-09 NOTE — PROGRESS NOTES
Physical Therapy Daily Treatment Note      Patient: Jaquan Mckay   : 1970  Referring practitioner: Raul Eagle MD  Date of Initial Visit: Type: THERAPY  Noted: 2021  Today's Date: 3/9/2021  Patient seen for 15 sessions         Subjective:  Patient reports /10 left knee pain prior to tx.  Pt states he tolerated yesterday's session well w/ no complaints.     Objective   See Exercise, Manual, and Modality Logs for complete treatment.       Assessment/Plan:  Patient tolerated treatment well today w/ reports of slight decrease in pain following, 4/10.  Pt received MH to L) knee f/b therex as listed and conclusion of manual rx.  Therex progressed w/ additional standing LE strengthening and closed chain balance activities added to program.  Pt observed w/ good tolerance, and required cues for proper form and mechanics.  Pt denied cryotherapy at conclusion.  Pt continues to benefit from therapy services and will be progressed as tolerated.  Continue w/ PT's POC.     Visit Diagnoses:    ICD-10-CM ICD-9-CM   1. S/P arthroscopic surgery of left knee  Z98.890 V45.89   2. Acute pain of left knee  M25.562 719.46   3. Decreased ROM of left knee  M25.662 719.56       Progress per Plan of Care and Progress strengthening /stabilization /functional activity           Timed:  Manual Therapy:    13     mins  61631;  Therapeutic Exercise:     32    mins  05329;     Neuromuscular Jazmin:        mins  77988;    Therapeutic Activity:          mins  84679;     Gait Training:           mins  45384;     Ultrasound:          mins  78008;    Electrical Stimulation:         mins  93746 ( );    Untimed:  Electrical Stimulation:         mins  66514 ( );  Mechanical Traction:         mins  36341;     Timed Treatment:   45   mins   Total Treatment:     55   mins  Leighann Lancaster PTA  Physical Therapist

## 2021-03-12 ENCOUNTER — TREATMENT (OUTPATIENT)
Dept: PHYSICAL THERAPY | Facility: CLINIC | Age: 51
End: 2021-03-12

## 2021-03-12 ENCOUNTER — LAB (OUTPATIENT)
Dept: LAB | Facility: HOSPITAL | Age: 51
End: 2021-03-12

## 2021-03-12 DIAGNOSIS — M25.562 ACUTE PAIN OF LEFT KNEE: ICD-10-CM

## 2021-03-12 DIAGNOSIS — Z98.890 S/P ARTHROSCOPIC SURGERY OF LEFT KNEE: Primary | ICD-10-CM

## 2021-03-12 DIAGNOSIS — M25.662 DECREASED ROM OF LEFT KNEE: ICD-10-CM

## 2021-03-12 PROCEDURE — 84443 ASSAY THYROID STIM HORMONE: CPT | Performed by: NURSE PRACTITIONER

## 2021-03-12 PROCEDURE — 97110 THERAPEUTIC EXERCISES: CPT | Performed by: PHYSICAL THERAPIST

## 2021-03-12 PROCEDURE — 82306 VITAMIN D 25 HYDROXY: CPT | Performed by: NURSE PRACTITIONER

## 2021-03-12 PROCEDURE — 80061 LIPID PANEL: CPT | Performed by: NURSE PRACTITIONER

## 2021-03-12 PROCEDURE — 97140 MANUAL THERAPY 1/> REGIONS: CPT | Performed by: PHYSICAL THERAPIST

## 2021-03-12 PROCEDURE — 83036 HEMOGLOBIN GLYCOSYLATED A1C: CPT | Performed by: NURSE PRACTITIONER

## 2021-03-12 PROCEDURE — 84439 ASSAY OF FREE THYROXINE: CPT | Performed by: NURSE PRACTITIONER

## 2021-03-12 PROCEDURE — 80053 COMPREHEN METABOLIC PANEL: CPT | Performed by: NURSE PRACTITIONER

## 2021-03-12 PROCEDURE — 80185 ASSAY OF PHENYTOIN TOTAL: CPT | Performed by: NURSE PRACTITIONER

## 2021-03-12 PROCEDURE — 82607 VITAMIN B-12: CPT | Performed by: NURSE PRACTITIONER

## 2021-03-12 PROCEDURE — 80186 ASSAY OF PHENYTOIN FREE: CPT | Performed by: NURSE PRACTITIONER

## 2021-03-12 PROCEDURE — 85025 COMPLETE CBC W/AUTO DIFF WBC: CPT | Performed by: NURSE PRACTITIONER

## 2021-03-12 NOTE — PROGRESS NOTES
Patient: Jaquan Mckay   : 1970  Referring practitioner: Raul Eagle MD  Date of Initial Visit: Type: THERAPY  Noted: 2021  Today's Date: 3/12/2021  Patient seen for 16 sessions           Subjective Evaluation    History of Present Illness    Subjective comment: Pt reports having 8/10 pain today.  Pt reports he has been having increased cramps recently.       Objective   See Exercise, Manual, and Modality Logs for complete treatment.       Assessment & Plan     Assessment  Assessment details: Tx today consisted of mh to knee followed by there ex for improved knee stability and balance and ended with stm to knee and ice.  Pt responded well to tx today with 4/10 post pain.  Pt demonstrated improvements with tandem standing today.    Plan  Plan details: Will follow progressing standing balance and improved knee stability.        Visit Diagnoses:    ICD-10-CM ICD-9-CM   1. S/P arthroscopic surgery of left knee  Z98.890 V45.89   2. Acute pain of left knee  M25.562 719.46   3. Decreased ROM of left knee  M25.662 719.56       Progress strengthening /stabilization /functional activity           Timed:  Manual Therapy:    13     mins  02755;  Therapeutic Exercise:    33     mins  73954;     Neuromuscular Jazmin:        mins  38238;    Therapeutic Activity:          mins  90970;     Gait Training:           mins  10423;     Ultrasound:          mins  72910;    Electrical Stimulation:         mins  06981 ( );    Untimed:  Electrical Stimulation:         mins  68435 ( );  Mechanical Traction:         mins  11904;     Timed Treatment:   46   mins   Total Treatment:     52   Mins(6min ice)  Win Rodriguez, PT  Physical Therapist

## 2021-03-15 ENCOUNTER — TREATMENT (OUTPATIENT)
Dept: PHYSICAL THERAPY | Facility: CLINIC | Age: 51
End: 2021-03-15

## 2021-03-15 ENCOUNTER — BULK ORDERING (OUTPATIENT)
Dept: CASE MANAGEMENT | Facility: OTHER | Age: 51
End: 2021-03-15

## 2021-03-15 DIAGNOSIS — Z23 IMMUNIZATION DUE: ICD-10-CM

## 2021-03-15 DIAGNOSIS — Z98.890 S/P ARTHROSCOPIC SURGERY OF LEFT KNEE: Primary | ICD-10-CM

## 2021-03-15 DIAGNOSIS — M25.662 DECREASED ROM OF LEFT KNEE: ICD-10-CM

## 2021-03-15 DIAGNOSIS — M25.562 ACUTE PAIN OF LEFT KNEE: ICD-10-CM

## 2021-03-15 PROCEDURE — 97110 THERAPEUTIC EXERCISES: CPT | Performed by: PHYSICAL THERAPIST

## 2021-03-15 PROCEDURE — 97140 MANUAL THERAPY 1/> REGIONS: CPT | Performed by: PHYSICAL THERAPIST

## 2021-03-15 NOTE — PROGRESS NOTES
Physical Therapy Daily Treatment Note      Patient: Jaquan Mckay   : 1970  Referring practitioner: Raul Eagle MD  Date of Initial Visit: Type: THERAPY  Noted: 2021  Today's Date: 3/15/2021  Patient seen for 17 sessions         Subjective:  Patient arrives to therapy w/ reports of increased knee pain, /10 pre tx.  Pt states as he was hanging curtains at home over the weekend, his legs gave away on him, and he fell backwards.  Pt reports he landed on his bed, however notes there was a wooden box on the bed he landed on.  Pt states his left knee has been more swollen, however he reports he did not twist it.      Objective   See Exercise, Manual, and Modality Logs for complete treatment.       Assessment/Plan:   Supervising therapist, Radha Tijerina, PT, DPT notified and aware of pt's subjective reports.  PT performed assessment w/ increased edema noted, and tenderness along hamstring region.  Otherwise no positive findings observed.  PT and PTA educated to continue to monitor symptoms and seek medical attention if needed.  Pt received MH to L) hip flexor/ quad f/b manual rx, and therex as listed.  Cryotherapy applied at conclusion.  Therex performed conservative w/ standing activities held secondary to pt's increased soreness upon arrival.  Pt educated to utilize cryotherapy at home to assist w/ swelling.  Pt continues to benefit from therapy services and will be progressed as tolerated.  Will continue to monitor pt's symptoms.  Continue w/ PT's POC.     Visit Diagnoses:    ICD-10-CM ICD-9-CM   1. S/P arthroscopic surgery of left knee  Z98.890 V45.89   2. Acute pain of left knee  M25.562 719.46   3. Decreased ROM of left knee  M25.662 719.56       Progress per Plan of Care and Progress strengthening /stabilization /functional activity           Timed:  Manual Therapy:    15     mins  68864;  Therapeutic Exercise:    26     mins  24549;     Neuromuscular Jazmin:        mins  07930;    Therapeutic  Activity:          mins  29310;     Gait Training:           mins  98824;     Ultrasound:          mins  33750;    Electrical Stimulation:         mins  44687 ( );    Untimed:  Electrical Stimulation:         mins  80560 ( );  Mechanical Traction:         mins  07260;     Timed Treatment:  41    mins   Total Treatment:     56   mins  Leighann Brown. NEIL Lancaster  Physical Therapist

## 2021-03-17 ENCOUNTER — TREATMENT (OUTPATIENT)
Dept: PHYSICAL THERAPY | Facility: CLINIC | Age: 51
End: 2021-03-17

## 2021-03-17 DIAGNOSIS — M25.562 ACUTE PAIN OF LEFT KNEE: ICD-10-CM

## 2021-03-17 DIAGNOSIS — M25.662 DECREASED ROM OF LEFT KNEE: ICD-10-CM

## 2021-03-17 DIAGNOSIS — Z98.890 S/P ARTHROSCOPIC SURGERY OF LEFT KNEE: Primary | ICD-10-CM

## 2021-03-17 PROCEDURE — 97110 THERAPEUTIC EXERCISES: CPT | Performed by: PHYSICAL THERAPIST

## 2021-03-17 PROCEDURE — 97140 MANUAL THERAPY 1/> REGIONS: CPT | Performed by: PHYSICAL THERAPIST

## 2021-03-17 NOTE — PROGRESS NOTES
Patient: Jaquan Mckay   : 1970  Referring practitioner: Raul Eagle MD  Date of Initial Visit: Type: THERAPY  Noted: 2021  Today's Date: 3/17/2021  Patient seen for 18 sessions           Subjective Evaluation    History of Present Illness    Subjective comment: Pt reports having 8/10 pain today.  Pt reports walking for exercises yesterday.       Objective   See Exercise, Manual, and Modality Logs for complete treatment.       Assessment & Plan     Assessment  Assessment details: Tx today consisted of mh to knee followed by there ex for improved knee mobility and stability, standing there ex and proprioceptive training and ended with stm to knee for decreased pain.  Pt demonstrated good effort today yet reported pain with many of the exercises including bike, SLR and heel slides.  Pt reported 6/10 post pain.    Plan  Plan details: Will follow for improved knee stability and decreased pain.        Visit Diagnoses:    ICD-10-CM ICD-9-CM   1. S/P arthroscopic surgery of left knee  Z98.890 V45.89   2. Acute pain of left knee  M25.562 719.46   3. Decreased ROM of left knee  M25.662 719.56       Progress strengthening /stabilization /functional activity           Timed:  Manual Therapy:    14     mins  71218;  Therapeutic Exercise:    31     mins  90183;     Neuromuscular Jazmin:        mins  64586;    Therapeutic Activity:          mins  37316;     Gait Training:           mins  37962;     Ultrasound:          mins  53613;    Electrical Stimulation:         mins  32296 ( );    Untimed:  Electrical Stimulation:         mins  48457 ( );  Mechanical Traction:         mins  51527;     Timed Treatment:   45   mins   Total Treatment:     61   mins  Win Rodriguez, PT  Physical Therapist

## 2021-03-19 ENCOUNTER — TREATMENT (OUTPATIENT)
Dept: PHYSICAL THERAPY | Facility: CLINIC | Age: 51
End: 2021-03-19

## 2021-03-19 DIAGNOSIS — M25.662 DECREASED ROM OF LEFT KNEE: ICD-10-CM

## 2021-03-19 DIAGNOSIS — M25.562 ACUTE PAIN OF LEFT KNEE: ICD-10-CM

## 2021-03-19 DIAGNOSIS — Z98.890 S/P ARTHROSCOPIC SURGERY OF LEFT KNEE: Primary | ICD-10-CM

## 2021-03-19 PROCEDURE — 97110 THERAPEUTIC EXERCISES: CPT | Performed by: PHYSICAL THERAPIST

## 2021-03-19 PROCEDURE — 97035 APP MDLTY 1+ULTRASOUND EA 15: CPT | Performed by: PHYSICAL THERAPIST

## 2021-03-19 NOTE — PROGRESS NOTES
Re-Assessment / Re-Certification      Patient: Jaquan Mckay   : 1970  Diagnosis/ICD-10 Code:  S/P arthroscopic surgery of left knee [Z98.890]  Referring practitioner: Raul Eagle MD  Date of Initial Visit: Type: THERAPY  Noted: 2021  Today's Date: 3/19/2021  Patient seen for 19 sessions    Visit Diagnoses:    ICD-10-CM ICD-9-CM   1. S/P arthroscopic surgery of left knee  Z98.890 V45.89   2. Acute pain of left knee  M25.562 719.46   3. Decreased ROM of left knee  M25.662 719.56       Subjective:   Jaquan Mckay reports:   Subjective Questionnaire: LEFS: 34  Clinical Progress: improved  Home Program Compliance: Yes  Treatment has included: therapeutic exercise, neuromuscular re-education, manual therapy, therapeutic activity, gait training, electrical stimulation, ultrasound, dry needling, moist heat and cryotherapy    Subjective Evaluation    History of Present Illness    Subjective comment: Pt reports having continued increased left knee pain.  He reports his strength has improved and he has improved endurance.Pain  Current pain ratin  At best pain ratin  At worst pain ratin         Objective          Active Range of Motion   Left Knee   Flexion: 105 degrees   Extension: 0 degrees     Strength/Myotome Testing     Left Knee   Flexion: 4+  Extension: 4-    Right Knee   Flexion: 4+  Extension: 4+    Ambulation     Comments   Pt amb with antalgic gait with decreased stance on left LE      Assessment & Plan     Assessment  Impairments: abnormal gait, abnormal muscle firing, abnormal or restricted ROM, activity intolerance, impaired balance, impaired physical strength, lacks appropriate home exercise program and pain with function  Assessment details: Pt is a 51 y/o male referred to therapy following a left knee surgery.  Pt has demonstrated good effort with improved knee strength to 4/5, knee ROM 0-109 and improved LEFS from 26 to 34/80.  Pt cont to report increased knee pain and often has  impairments with progression of WB activities. Pt responded well to tx today with reports of 6/10 post pain.  Prognosis: good  Functional Limitations: carrying objects, walking, uncomfortable because of pain, standing and unable to perform repetitive tasks  Goals  Plan Goals: STG 4 weeks    1. Pt will be instructed in a HEP.      Met  2. Pt will improve his left knee flexion to 100 degrees to improve driving.      Met:  3. Pt will report pain no greater than 5/10.      Ongoing: Per pt report, 10/10    LTG 8 weeks    1. Pt will be able to ambulate with good symmetry and velocity with no AD.          Ongoing: Decreased left stance phase and weight shift with ambulation                      without AD.  2. Pt will demonstrate 4/5 gross left knee strength.          Met:   3. Pt will report pain no greater than 2/10 with increased walking and ADLs at home.          Ongoing    Plan  Therapy options: will be seen for skilled physical therapy services  Planned modality interventions: cryotherapy, electrical stimulation/Russian stimulation, thermotherapy (hydrocollator packs), ultrasound and iontophoresis  Planned therapy interventions: abdominal trunk stabilization, ADL retraining, balance/weight-bearing training, fine motor coordination training, flexibility, functional ROM exercises, gait training, home exercise program, IADL retraining, joint mobilization, manual therapy, neuromuscular re-education, postural training, soft tissue mobilization, strengthening, stretching, therapeutic activities and transfer training  Duration in visits: 16  Duration in weeks: 8  Treatment plan discussed with: patient  Plan details: Will follow for optimal gains.    Moderate Evaluation  33525  Re-evaluation   50065  Therapeutic exercise  61930  Therapeutic activity    31688  Neuromuscular re-education   07397  Manual therapy   73771  Gait training  04035  Unattended e-stim (Medicaid/Medicare)     Moist heat/cryotherapy 01552    Ultrasound   85775            Visit Diagnoses:    ICD-10-CM ICD-9-CM   1. S/P arthroscopic surgery of left knee  Z98.890 V45.89   2. Acute pain of left knee  M25.562 719.46   3. Decreased ROM of left knee  M25.662 719.56       Progress toward previous goals: Partially Met    New Goals  Short-term goals (STG): 2/3  Long-term goals (LTG): 1/3      Recommendations: Continue as planned  Timeframe: 2 months  Prognosis to achieve goals: good    PT Signature: Win Rodriguez, GABRIELE      Based upon review of the patient's progress and continued therapy plan, it is my medical opinion that Jaquan cMkay should continue physical therapy treatment at [facility]  [address] [place] [id number] [phone].    Signature: __________________________________  Raul Eagle MD    Timed:  Manual Therapy:         mins  88306;  Therapeutic Exercise:  34      mins  85628;     Neuromuscular Jazmin:        mins  99092;    Therapeutic Activity:          mins  49793;     Gait Training:           mins  48631;     Ultrasound:     8     mins  03074;    Electrical Stimulation:         mins  26816 ( );    Untimed:  Electrical Stimulation:         mins  28684 ( );  Mechanical Traction:         mins  97468;     Timed Treatment:   42   mins   Total Treatment:     42   mins

## 2021-03-22 ENCOUNTER — TREATMENT (OUTPATIENT)
Dept: PHYSICAL THERAPY | Facility: CLINIC | Age: 51
End: 2021-03-22

## 2021-03-22 DIAGNOSIS — M25.662 DECREASED ROM OF LEFT KNEE: ICD-10-CM

## 2021-03-22 DIAGNOSIS — M25.562 ACUTE PAIN OF LEFT KNEE: ICD-10-CM

## 2021-03-22 DIAGNOSIS — Z98.890 S/P ARTHROSCOPIC SURGERY OF LEFT KNEE: Primary | ICD-10-CM

## 2021-03-22 PROCEDURE — 97140 MANUAL THERAPY 1/> REGIONS: CPT | Performed by: PHYSICAL THERAPIST

## 2021-03-22 PROCEDURE — 97110 THERAPEUTIC EXERCISES: CPT | Performed by: PHYSICAL THERAPIST

## 2021-03-22 NOTE — PROGRESS NOTES
Physical Therapy Daily Treatment Note      Patient: Jaquan Mckay   : 1970  Referring practitioner: Raul Eagle MD  Date of Initial Visit: Type: THERAPY  Noted: 2021  Today's Date: 3/22/2021  Patient seen for 20 sessions         Subjective:  Patient arrives to therapy w/ reports of 8/10 left knee pain.  Pt states he had to move some totes to storage earlier today, therefore his knee is more sore and painful.      Objective   See Exercise, Manual, and Modality Logs for complete treatment.       Assessment/Plan:  Patient tolerated treatment well today w/ reports of slight decrease in pain following, 5/10.  Pt received MH to L) knee for pain control and improved tissue mobility f/b therex as listed,  manual rx and conclusion of cryotherapy.  Therex progressed w/ additional standing LE strengthening activities added including alt foot taps on step, and heel toe walking initiated.  Pt observed w/ good tolerance and required cues for proper form.  Cryotherapy applied to L) knee at conclusion.  Pt continues to benefit from therapy services to address goals, and achieve maximum level of function.  Continue w/ PT's POC.    Visit Diagnoses:    ICD-10-CM ICD-9-CM   1. S/P arthroscopic surgery of left knee  Z98.890 V45.89   2. Acute pain of left knee  M25.562 719.46   3. Decreased ROM of left knee  M25.662 719.56       Progress per Plan of Care and Progress strengthening /stabilization /functional activity           Timed:  Manual Therapy:    13     mins  94262;  Therapeutic Exercise:    36     mins  27927;     Neuromuscular Jazmin:        mins  24052;    Therapeutic Activity:          mins  71139;     Gait Training:           mins  87617;     Ultrasound:          mins  46027;    Electrical Stimulation:         mins  62885 ( );    Untimed:  Electrical Stimulation:         mins  31430 ( );  Mechanical Traction:         mins  84268;     Timed Treatment:  49    mins   Total Treatment:    67     steve Brown. Tonny, NEIL  Physical Therapist

## 2021-03-24 ENCOUNTER — TREATMENT (OUTPATIENT)
Dept: PHYSICAL THERAPY | Facility: CLINIC | Age: 51
End: 2021-03-24

## 2021-03-24 DIAGNOSIS — M25.662 DECREASED ROM OF LEFT KNEE: ICD-10-CM

## 2021-03-24 DIAGNOSIS — Z98.890 S/P ARTHROSCOPIC SURGERY OF LEFT KNEE: Primary | ICD-10-CM

## 2021-03-24 DIAGNOSIS — M25.562 ACUTE PAIN OF LEFT KNEE: ICD-10-CM

## 2021-03-24 PROCEDURE — 97140 MANUAL THERAPY 1/> REGIONS: CPT | Performed by: PHYSICAL THERAPIST

## 2021-03-24 PROCEDURE — 97014 ELECTRIC STIMULATION THERAPY: CPT | Performed by: PHYSICAL THERAPIST

## 2021-03-24 PROCEDURE — 97110 THERAPEUTIC EXERCISES: CPT | Performed by: PHYSICAL THERAPIST

## 2021-03-24 NOTE — PROGRESS NOTES
Physical Therapy Daily Treatment Note      Patient: Jaquan Mckay   : 1970  Referring practitioner: Raul Eagle MD  Date of Initial Visit: Type: THERAPY  Noted: 2021  Today's Date: 3/24/2021  Patient seen for 21 sessions         Subjective Evaluation    Pain  Current pain ratin      :  Patient arrives to therapy w/ reports of increased knee pain.  Pt states he had to walk home yesterday, and it took him nearly two hours.        Objective   See Exercise, Manual, and Modality Logs for complete treatment.       Assessment/Plan:  Patient completed today's session w/ reports of decreased knee pain following, 6/10.  Pt request to omit LE bike and TG during today's tx secondary to increased pain from walking yesterday.  Pt received MH to L) knee f/b  therex as listed for improved left knee ROM, LE strength and stability.  Resistance band advanced from red to green w/ no complaints observed.  Treatment concluded w/ manual STM and Estim w/ cryotherapy to assist w/ pain control.  Supervising therapist, Win Rodriguez, PT assisted w/ initiation of tx.  Pt will be progressed w/ therapy as tolerated.  Continue w/ PT's POC.     Visit Diagnoses:    ICD-10-CM ICD-9-CM   1. S/P arthroscopic surgery of left knee  Z98.890 V45.89   2. Acute pain of left knee  M25.562 719.46   3. Decreased ROM of left knee  M25.662 719.56       Progress per Plan of Care and Progress strengthening /stabilization /functional activity           Timed:  Manual Therapy:    11     mins  08440;  Therapeutic Exercise:      31   mins  15301;     Neuromuscular Jazmin:        mins  28939;    Therapeutic Activity:          mins  45935;     Gait Training:           mins  68942;     Ultrasound:          mins  19884;    Electrical Stimulation:         mins  64987 ( );    Untimed:  Electrical Stimulation:    10     mins  53034 ( );  Mechanical Traction:         mins  73071;     Timed Treatment:  42    mins   Total Treatment:    52     steve Brown. Tonny, NEIL  Physical Therapist

## 2021-03-26 ENCOUNTER — TREATMENT (OUTPATIENT)
Dept: PHYSICAL THERAPY | Facility: CLINIC | Age: 51
End: 2021-03-26

## 2021-03-26 DIAGNOSIS — M25.562 ACUTE PAIN OF LEFT KNEE: ICD-10-CM

## 2021-03-26 DIAGNOSIS — Z98.890 S/P ARTHROSCOPIC SURGERY OF LEFT KNEE: Primary | ICD-10-CM

## 2021-03-26 DIAGNOSIS — M25.662 DECREASED ROM OF LEFT KNEE: ICD-10-CM

## 2021-03-26 PROCEDURE — 97110 THERAPEUTIC EXERCISES: CPT | Performed by: PHYSICAL THERAPIST

## 2021-03-26 PROCEDURE — 97014 ELECTRIC STIMULATION THERAPY: CPT | Performed by: PHYSICAL THERAPIST

## 2021-03-26 NOTE — PROGRESS NOTES
Patient: Jaquan Mckay   : 1970  Referring practitioner: Raul Eagle MD  Date of Initial Visit: Type: THERAPY  Noted: 2021  Today's Date: 3/26/2021  Patient seen for 22 sessions           Subjective Evaluation    History of Present Illness    Subjective comment: Pt reports he has been walking more at home and has 8/10 pain today.       Objective   See Exercise, Manual, and Modality Logs for complete treatment.       Assessment & Plan     Assessment  Assessment details: Tx today consisted of mh to knee followed by there ex for LE rom , quad stability and improved proprioception and ended with ice and estim. Pt responded fair to treatment with reports of pain with SLRs and fitter balance.  Pt did report decreased pain to 6/10 post tx.    Plan  Plan details: Will cont to follow for improved knee stability and decreased pain.        Visit Diagnoses:    ICD-10-CM ICD-9-CM   1. S/P arthroscopic surgery of left knee  Z98.890 V45.89   2. Acute pain of left knee  M25.562 719.46   3. Decreased ROM of left knee  M25.662 719.56       Progress strengthening /stabilization /functional activity           Timed:  Manual Therapy:         mins  07904;  Therapeutic Exercise:    29     mins  19105;     Neuromuscular Jazmin:        mins  23421;    Therapeutic Activity:          mins  68782;     Gait Training:           mins  23274;     Ultrasound:          mins  60796;    Electrical Stimulation:         mins  94331 ( );    Untimed:  Electrical Stimulation:    10     mins  50841 ( );  Mechanical Traction:         mins  66508;     Timed Treatment:   29   mins   Total Treatment:     49   mins  Win Rodriguez, PT  Physical Therapist

## 2021-04-12 ENCOUNTER — HOSPITAL ENCOUNTER (EMERGENCY)
Facility: HOSPITAL | Age: 51
Discharge: HOME | End: 2021-04-12
Attending: EMERGENCY MEDICINE | Admitting: EMERGENCY MEDICINE

## 2021-04-12 ENCOUNTER — OFFICE VISIT (OUTPATIENT)
Dept: FAMILY MEDICINE CLINIC | Facility: CLINIC | Age: 51
End: 2021-04-12

## 2021-04-12 VITALS
HEIGHT: 67 IN | TEMPERATURE: 98 F | BODY MASS INDEX: 36.57 KG/M2 | WEIGHT: 233 LBS | SYSTOLIC BLOOD PRESSURE: 125 MMHG | DIASTOLIC BLOOD PRESSURE: 79 MMHG | RESPIRATION RATE: 16 BRPM | OXYGEN SATURATION: 96 % | HEART RATE: 75 BPM

## 2021-04-12 VITALS
TEMPERATURE: 97 F | BODY MASS INDEX: 36.7 KG/M2 | HEIGHT: 67 IN | HEART RATE: 90 BPM | SYSTOLIC BLOOD PRESSURE: 130 MMHG | WEIGHT: 233.8 LBS | DIASTOLIC BLOOD PRESSURE: 82 MMHG | OXYGEN SATURATION: 98 %

## 2021-04-12 DIAGNOSIS — E55.9 VITAMIN D DEFICIENCY: ICD-10-CM

## 2021-04-12 DIAGNOSIS — R56.9 SEIZURE (HCC): Primary | ICD-10-CM

## 2021-04-12 DIAGNOSIS — R11.0 NAUSEA: Primary | ICD-10-CM

## 2021-04-12 DIAGNOSIS — E78.2 MIXED HYPERLIPIDEMIA: ICD-10-CM

## 2021-04-12 DIAGNOSIS — I10 ESSENTIAL HYPERTENSION: ICD-10-CM

## 2021-04-12 DIAGNOSIS — R56.9 SEIZURES (HCC): ICD-10-CM
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
## 2021-04-12 DIAGNOSIS — F51.04 PSYCHOPHYSIOLOGICAL INSOMNIA: ICD-10-CM

## 2021-04-12 DIAGNOSIS — S40.862A TICK BITE OF LEFT UPPER ARM, INITIAL ENCOUNTER: ICD-10-CM

## 2021-04-12 PROBLEM — E66.811 OBESITY (BMI 30.0-34.9): Status: RESOLVED | Noted: 2017-01-03 | Resolved: 2021-04-12

## 2021-04-12 PROBLEM — E66.9 OBESITY (BMI 30.0-34.9): Status: RESOLVED | Noted: 2017-01-03 | Resolved: 2021-04-12

## 2021-04-12 LAB
ALBUMIN SERPL-MCNC: 3.76 G/DL (ref 3.5–5.2)
ALBUMIN/GLOB SERPL: 1.1 G/DL
ALP SERPL-CCNC: 101 U/L (ref 39–117)
ALT SERPL W P-5'-P-CCNC: 19 U/L (ref 1–41)
ANION GAP SERPL CALCULATED.3IONS-SCNC: 7.4 MMOL/L (ref 5–15)
AST SERPL-CCNC: 20 U/L (ref 1–40)
BASOPHILS # BLD AUTO: 0.01 10*3/MM3 (ref 0–0.2)
BASOPHILS NFR BLD AUTO: 0.2 % (ref 0–1.5)
BILIRUB SERPL-MCNC: 0.3 MG/DL (ref 0–1.2)
BUN SERPL-MCNC: 15 MG/DL (ref 6–20)
BUN/CREAT SERPL: 13.9 (ref 7–25)
CALCIUM SPEC-SCNC: 8.4 MG/DL (ref 8.6–10.5)
CHLORIDE SERPL-SCNC: 105 MMOL/L (ref 98–107)
CO2 SERPL-SCNC: 23.6 MMOL/L (ref 22–29)
CREAT SERPL-MCNC: 1.08 MG/DL (ref 0.76–1.27)
DEPRECATED RDW RBC AUTO: 44.4 FL (ref 37–54)
EOSINOPHIL # BLD AUTO: 0.14 10*3/MM3 (ref 0–0.4)
EOSINOPHIL NFR BLD AUTO: 2.3 % (ref 0.3–6.2)
ERYTHROCYTE [DISTWIDTH] IN BLOOD BY AUTOMATED COUNT: 12.8 % (ref 12.3–15.4)
GFR SERPL CREATININE-BSD FRML MDRD: 73 ML/MIN/1.73
GLOBULIN UR ELPH-MCNC: 3.4 GM/DL
GLUCOSE SERPL-MCNC: 91 MG/DL (ref 65–99)
HCT VFR BLD AUTO: 44.8 % (ref 37.5–51)
HGB BLD-MCNC: 15.4 G/DL (ref 13–17.7)
IMM GRANULOCYTES # BLD AUTO: 0.01 10*3/MM3 (ref 0–0.05)
IMM GRANULOCYTES NFR BLD AUTO: 0.2 % (ref 0–0.5)
LYMPHOCYTES # BLD AUTO: 1.59 10*3/MM3 (ref 0.7–3.1)
LYMPHOCYTES NFR BLD AUTO: 26.5 % (ref 19.6–45.3)
MCH RBC QN AUTO: 32.4 PG (ref 26.6–33)
MCHC RBC AUTO-ENTMCNC: 34.4 G/DL (ref 31.5–35.7)
MCV RBC AUTO: 94.1 FL (ref 79–97)
MONOCYTES # BLD AUTO: 1.17 10*3/MM3 (ref 0.1–0.9)
MONOCYTES NFR BLD AUTO: 19.5 % (ref 5–12)
NEUTROPHILS NFR BLD AUTO: 3.07 10*3/MM3 (ref 1.7–7)
NEUTROPHILS NFR BLD AUTO: 51.3 % (ref 42.7–76)
NRBC BLD AUTO-RTO: 0 /100 WBC (ref 0–0.2)
PHENYTOIN SERPL-MCNC: 11.2 MCG/ML (ref 10–20)
PLATELET # BLD AUTO: 155 10*3/MM3 (ref 140–450)
PMV BLD AUTO: 9.3 FL (ref 6–12)
POTASSIUM SERPL-SCNC: 3.9 MMOL/L (ref 3.5–5.2)
PROT SERPL-MCNC: 7.2 G/DL (ref 6–8.5)
RBC # BLD AUTO: 4.76 10*6/MM3 (ref 4.14–5.8)
SODIUM SERPL-SCNC: 136 MMOL/L (ref 136–145)
WBC # BLD AUTO: 5.99 10*3/MM3 (ref 3.4–10.8)

## 2021-04-12 PROCEDURE — 85025 COMPLETE CBC W/AUTO DIFF WBC: CPT | Performed by: EMERGENCY MEDICINE

## 2021-04-12 PROCEDURE — 80185 ASSAY OF PHENYTOIN TOTAL: CPT | Performed by: EMERGENCY MEDICINE

## 2021-04-12 PROCEDURE — 93005 ELECTROCARDIOGRAM TRACING: CPT | Performed by: EMERGENCY MEDICINE

## 2021-04-12 PROCEDURE — 93005 ELECTROCARDIOGRAM TRACING: CPT | Performed by: STUDENT IN AN ORGANIZED HEALTH CARE EDUCATION/TRAINING PROGRAM

## 2021-04-12 PROCEDURE — 99284 EMERGENCY DEPT VISIT MOD MDM: CPT

## 2021-04-12 PROCEDURE — 80053 COMPREHEN METABOLIC PANEL: CPT | Performed by: EMERGENCY MEDICINE

## 2021-04-12 PROCEDURE — 99214 OFFICE O/P EST MOD 30 MIN: CPT | Performed by: NURSE PRACTITIONER

## 2021-04-12 PROCEDURE — 93010 ELECTROCARDIOGRAM REPORT: CPT | Performed by: INTERNAL MEDICINE

## 2021-04-12 RX ORDER — PROMETHAZINE HYDROCHLORIDE 25 MG/1
25 TABLET ORAL EVERY 6 HOURS PRN
Qty: 30 TABLET | Refills: 1 | Status: ON HOLD | OUTPATIENT
Start: 2021-04-12 | End: 2021-08-02

## 2021-04-12 RX ORDER — ONDANSETRON 4 MG/1
4 TABLET, FILM COATED ORAL EVERY 8 HOURS PRN
Qty: 30 TABLET | Refills: 1 | Status: SHIPPED | OUTPATIENT
Start: 2021-04-12 | End: 2021-07-21 | Stop reason: SDUPTHER

## 2021-04-12 RX ORDER — PHENYTOIN SODIUM 100 MG/1
200 CAPSULE, EXTENDED RELEASE ORAL DAILY
Status: DISCONTINUED | OUTPATIENT
Start: 2021-04-12 | End: 2021-04-12 | Stop reason: HOSPADM

## 2021-04-12 RX ADMIN — PHENYTOIN SODIUM 200 MG: 100 CAPSULE, EXTENDED RELEASE ORAL at 19:43

## 2021-04-12 NOTE — PROGRESS NOTES
"Subjective   Jaquan Mckay is a 50 y.o. male.     Chief Complaint   Patient presents with   • Hypertension       History of Present Illness     Hypertension-chronic and ongoing.  Patient is presently taking lisinopril 30 mg.  He denies any negative side effects.  No associated symptoms such as CP, SOA, HA, or dizziness.  Hyperlipidemia-chronic and ongoing.  Patient is presently taking atorvastatin 40 mg.  No negative side effects.  Patient denies any negative side effects of cholesterol medication.  No reported myalgia or myopathies.  Seizure disorder-patient is presently taking Dilantin 100 mg 2 capsules twice daily.  No negative side effects.  He reports yesterday he had 3 small seizures.  He reports \"weak as water\" today.  He reports he did not drive.  He reports \" I could tell I was going to have one\".  He reports \"I have never had 3 in the same day\".  He reports he felt flushed and got up to leave [employer].  He reports \"I nearly fell\".  He reports after he felt nausea and fatigued.   Some mild dizziness today.  Some HA today.  He did not have any falls.  He reports he could hear the activity going on around him but \"I could not talk\".   He reports vision is good today but yesterday he noted some double.    He has not missed any medication.   Vitamin D deficiency-chronic and ongoing.  Patient is presently taking vitamin D 50,000 units supplement.  No negative side effects of medication are reported.  Vitamin D level is stable with medication use.  Knee pain-chronic and ongoing.  He reports knee brace \"won't stay up\" on his knee.  Knee is popping.  He will be having a \"knee replacement this fall\" but \"wanted to do it now'.  He request phone number to bracing company so that he may call them for further evaluation.  Right elbow pain-has been doing better over the last several weeks.  He reports no concerns.  He has not noted any further knotted areas or spasm.    He was unable to get TENS unit due to cost.  "   Insomnia-chronic and ongoing.  Patient is currently taking Remeron 45 mg nightly.  No negative side effects.  He feels he continues not to sleep very well and is awaking some at times.  He has failed other meds for insomnia due to side effects or ineffectiveness.  Tick bite-reports 2 removed on left upper arm.  He is uncertain how long they were there.  He cannot take Doxycycline due to allergy.  Some erythema of skin.  No itching.    The following portions of the patient's history were reviewed and updated as appropriate: CC, ROS, allergies, current medications, past family history, past medical history, past social history, past surgical history and problem list.    Review of Systems   Constitutional: Negative for activity change, appetite change and fever.   HENT: Positive for sinus pressure. Negative for congestion, ear pain, facial swelling, nosebleeds, rhinorrhea, sore throat and trouble swallowing.    Eyes: Negative for blurred vision, double vision and redness.   Respiratory: Negative for cough, chest tightness, shortness of breath and wheezing.    Cardiovascular: Negative for chest pain (acute this morning that began in his back), palpitations and leg swelling.   Gastrointestinal: Negative for abdominal pain, blood in stool, constipation and diarrhea.   Endocrine: Negative.    Genitourinary: Negative for dysuria, flank pain, frequency, hematuria and urgency.   Musculoskeletal: Positive for arthralgias, back pain and gait problem. Negative for joint swelling.   Skin: Negative.    Allergic/Immunologic: Negative.    Neurological: Positive for headache. Negative for dizziness, weakness and light-headedness.   Hematological: Negative.    Psychiatric/Behavioral: Positive for sleep disturbance. Negative for self-injury, suicidal ideas and depressed mood. The patient is not nervous/anxious.    All other systems reviewed and are negative.      Objective     /82   Pulse 90   Temp 97 °F (36.1 °C)   Ht 170.2  "cm (67.01\")   Wt 106 kg (233 lb 12.8 oz)   SpO2 98%   BMI 36.61 kg/m²     Physical Exam  Vitals reviewed.   Constitutional:       General: He is not in acute distress.     Appearance: He is well-developed. He is obese. He is not diaphoretic.   HENT:      Head: Normocephalic and atraumatic.      Comments: Oropharynx not examined.  Patient is presently wearing a face covering/mask due to COVID-19 pandemic.     Right Ear: Hearing, tympanic membrane, ear canal and external ear normal.      Left Ear: Hearing, tympanic membrane, ear canal and external ear normal.   Eyes:      General: Lids are normal. No scleral icterus.     Extraocular Movements:      Right eye: Normal extraocular motion and no nystagmus.      Left eye: Normal extraocular motion and no nystagmus.      Conjunctiva/sclera: Conjunctivae normal.      Pupils: Pupils are equal, round, and reactive to light.   Neck:      Thyroid: No thyromegaly.      Vascular: No carotid bruit or JVD.      Trachea: No tracheal tenderness.   Cardiovascular:      Rate and Rhythm: Normal rate and regular rhythm.      Heart sounds: Normal heart sounds, S1 normal and S2 normal. No murmur heard.     Pulmonary:      Effort: Pulmonary effort is normal.      Breath sounds: Normal breath sounds.   Chest:      Chest wall: No tenderness.   Abdominal:      General: Bowel sounds are normal.      Palpations: Abdomen is soft. There is no hepatomegaly, splenomegaly or mass.      Tenderness: There is no abdominal tenderness.   Musculoskeletal:         General: Swelling (knee-left) present.      Right elbow: Normal range of motion. Tenderness present.        Arms:       Cervical back: Normal range of motion and neck supple.      Lumbar back: Spasms and tenderness present. Decreased range of motion.      Left knee: Swelling (mild) present. Decreased range of motion. Tenderness present over the medial joint line and lateral joint line.      Right lower leg: No edema.      Left lower leg: No " edema.      Comments: Gait slow and steady.  Antalgia noted.   equal bilaterally. No muscular atrophy or flaccidity.   Lymphadenopathy:      Cervical: No cervical adenopathy.      Right cervical: No superficial cervical adenopathy.     Left cervical: No superficial cervical adenopathy.   Skin:     General: Skin is warm and dry.      Capillary Refill: Capillary refill takes less than 2 seconds.      Coloration: Skin is not pale.      Findings: No erythema.      Nails: There is no clubbing.   Neurological:      Mental Status: He is alert and oriented to person, place, and time. Mental status is at baseline.      Cranial Nerves: No cranial nerve deficit.      Sensory: No sensory deficit.      Motor: No tremor, atrophy or abnormal muscle tone.      Coordination: Coordination normal.      Gait: Gait abnormal.      Deep Tendon Reflexes: Reflexes are normal and symmetric.   Psychiatric:         Attention and Perception: He is attentive.         Mood and Affect: Mood normal.         Speech: Speech normal.         Behavior: Behavior normal.         Thought Content: Thought content normal.         Judgment: Judgment normal.       Assessment/Plan     Problem List Items Addressed This Visit        Cardiac and Vasculature    Hypertension    Current Assessment & Plan     Continue lisinopril 30 mg.  Continue under the care of cardiology.  Ambulatory BP monitoring either at home or random community checks.  Patient to report continued elevations >140/90.  Patient may come by office for checks if needed.          Relevant Medications    lisinopril (PRINIVIL,ZESTRIL) 30 MG tablet    Hyperlipidemia    Current Assessment & Plan     Continue atorvastatin 40 mg.  Continue to work on weight loss and pursue a low-cholesterol diet..         Relevant Medications    atorvastatin (LIPITOR) 40 MG tablet       Endocrine and Metabolic    Vitamin D deficiency    Current Assessment & Plan     Continue vitamin D as directed.  Report any  negative side effects.  We will continue to monitor vitamin D labs and adjust supplement if necessary.            Neuro    Seizures (CMS/HCC)    Current Assessment & Plan     Continue Dilantin 200 mg twice daily.  Encouraged patient to reconsider neurology.  Encourage patient to work on stress reduction and sleep hygiene            Sleep    Psychophysiological insomnia    Current Assessment & Plan     Continue Remeron 45 mg.  Continue to work on sleep hygiene.  Encouraged to decrease any caffeine or energy drink intake           Other Visit Diagnoses     Nausea    -  Primary    Relevant Medications    promethazine (PHENERGAN) 25 MG tablet    Tick bite of left upper arm, initial encounter        Relevant Medications    mupirocin (BACTROBAN) 2 % ointment          Patient's Body mass index is 36.61 kg/m². BMI is above normal parameters. Recommendations include: nutrition counseling.     Understands disease processes and need for medications.  Understands reasons for urgent and emergent care.  Patient (& family) verbalized agreement for treatment plan.   Emotional support and active listening provided.  Patient provided time to verbalize feelings.    RTC 2 to 3 months, sooner if needed for problems or concerns          This document has been electronically signed by:  BALDOMERO Thao, FNP-C    Dragon disclaimer:  Much of this encounter note is an electronic transcription/translation of spoken language to printed text. The electronic translation of spoken language may permit erroneous, or at times, nonsensical words or phrases to be inadvertently transcribed; Although I have reviewed the note for such errors, some may still exist.

## 2021-04-12 NOTE — ASSESSMENT & PLAN NOTE
Continue lisinopril 30 mg.  Continue under the care of cardiology.  Ambulatory BP monitoring either at home or random community checks.  Patient to report continued elevations >140/90.  Patient may come by office for checks if needed.

## 2021-04-12 NOTE — ASSESSMENT & PLAN NOTE
Continue Dilantin 200 mg twice daily.  Encouraged patient to reconsider neurology.  Encourage patient to work on stress reduction and sleep hygiene

## 2021-04-12 NOTE — ED PROVIDER NOTES
"Subjective   Patient presents to ER with generalized weakness      Weakness - Generalized  Severity:  Moderate  Onset quality:  Gradual  Timing:  Constant  Chronicity:  Recurrent  Context comment:  Seizure      Review of Systems   Constitutional: Positive for activity change and fatigue.   HENT: Negative.    Eyes: Negative.    Respiratory: Negative.    Cardiovascular: Negative.    Gastrointestinal: Negative.    Endocrine: Negative.    Genitourinary: Negative.    Musculoskeletal: Negative.    Skin: Negative.    Allergic/Immunologic: Negative.    Hematological: Negative.    Psychiatric/Behavioral: Negative.        Past Medical History:   Diagnosis Date   • Allergic    • Anxiety    • Arthritis    • Asthma    • Body piercing     REPORTS CYLICONE IN EARS   • Clotting disorder (CMS/HCC) 2004    had a knee surgery   • Coronary artery disease    • Depression    • DVT (deep venous thrombosis) (CMS/[facility])     RIGHT RIGHT KNEE AFTER SURGERY YEARS AGO IN 2001 OR 2004   • Elevated cholesterol    • Gastric ulcer    • GERD (gastroesophageal reflux disease)    • H/O migraine    • Headache    • Heart attack (CMS/[facility])     REPORTS \"LIGHT HEART ATTACK A LONG TIME AGO\"  \"EARLY 90'S\"   • History of seizures     REPORTS LAST EPISODE WAS AROUND 1995.   • Hostility    • Hyperlipidemia    • Hypertension    • Knee pain, acute     Left   • Low back pain    • Lyme disease    • Migraine    • MRSA (methicillin resistant Staphylococcus aureus)     REPORTS LAST TESTED + 2004. WAS TREATED HE REPORTS.  RIGHT ARM, RIGHT KNEE.   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    • Obesity    • Poor historian    • [place] spotted fever    • Seizures (CMS/HCC)    • Sleep apnea    • Tattoo    • Wears glasses        Allergies   Allergen Reactions   • Ciprofloxacin Anaphylaxis and Hives   • Mobic [Meloxicam] Other (See Comments)     Pt states, \"It make my feet and hands go numb and I can't hardly walk.\"    • Paxil [Paroxetine Hcl] Shortness Of Breath     Chest pain    • " Peanut-Containing Drug Products Anaphylaxis   • Penicillins Anaphylaxis   • Pristiq [Desvenlafaxine Succinate Er] Dizziness   • Sulfa Antibiotics Anaphylaxis, Itching and Rash   • Doxycycline Hives   • Fish-Derived Products Hives   • Isosorbide Nitrate Rash     Rash, hives, had to use inhaler.    • Movantik [Naloxegol] Rash   • Buspar [Buspirone] Rash   • Clarithromycin Rash   • Clindamycin/Lincomycin Rash   • Codeine Rash   • Contrast Dye Itching and Rash   • Diltiazem Rash   • Gabapentin Rash   • Keflex [Cephalexin] Rash   • Linzess [Linaclotide] Rash   • Metoprolol Rash   • Prednisone Rash and Other (See Comments)     Face, feet, and legs go completely numb per patient   • Robitussin Cough+ Chest Max St [Dextromethorphan-Guaifenesin] Itching   • Shrimp (Diagnostic) Rash   • Spironolactone Rash   • Viibryd [Vilazodone Hcl] Itching and Rash   • Zoloft [Sertraline Hcl] Hives and Itching       Past Surgical History:   Procedure Laterality Date   • ABDOMINAL SURGERY     • BACK SURGERY     • BRAIN SURGERY  1986    Tumor removal    • CARDIAC CATHETERIZATION N/A 9/28/2018    Procedure: Left Heart Cath;  Surgeon: Leandro Daily MD;  Location: [facility] CATH INVASIVE LOCATION;  Service: Cardiology   • CHOLECYSTECTOMY     • COLONOSCOPY     • CYST REMOVAL      pilonidal cyst   • ELBOW EPICONDYLECTOMY Right 7/23/2020    Procedure: LATERAL EPICONDYLAR RELEASE;  Surgeon: Jose Ruiz MD;  Location: [facility];  Service: Orthopedics;  Laterality: Right;   • ENDOSCOPY     • FRACTURE SURGERY Right     elbow   • KNEE ARTHROSCOPY Left 10/20/2017    Procedure: Diagnostic arthroscopy left knee with chondroplasty;  Surgeon: Marco Aguirre MD;  Location: [facility] OR;  Service:    • KNEE ARTHROSCOPY Left 1/11/2021    Procedure: KNEE DIAGNOSTIC ARTHROSCOPY WITH  CHONDROPLASTY patella, femoral and medial;  Surgeon: Raul Eagle MD;  Location: [facility];  Service: Orthopedics;  Laterality: Left;   • KNEE SURGERY Right    • MOUTH  SURGERY      FULL MOUTH EXTRACTION   • OTHER SURGICAL HISTORY      REPORTS 7 TICKS REMOVED FROM RIGHT ARM IN 2001 OR 2002   • TENNIS ELBOW RELEASE Right 7/23/2020    Procedure: RIGHT TENNIS ELBOW RELEASE;  Surgeon: Jose Ruiz MD;  Location: [facility];  Service: Orthopedics;  Laterality: Right;   • TUMOR EXCISION      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       Family History   Problem Relation Age of Onset   • Diabetes Mother    • Hypertension Mother    • Stroke Mother    • Diabetes Father    • Skin cancer Father    • Hypertension Father    • Heart attack Father    • Diabetes Brother    • Hypertension Brother    • Heart disease Maternal Aunt    • Heart disease Maternal Uncle    • Heart disease Paternal Aunt    • Heart disease Paternal Uncle    • Heart disease Maternal Grandmother    • Heart disease Maternal Grandfather    • Heart disease Paternal Grandmother    • Heart disease Paternal Grandfather        Social History     Socioeconomic History   • Marital status:      Spouse name: Becca   • Number of children: 2   • Years of education: 12   • Highest education level: Not on file   Tobacco Use   • Smoking status: Former Smoker     Packs/day: 1.50     Years: 17.00     Pack years: 25.50     Types: Cigars, Cigarettes     Start date: 5/5/2010   • Smokeless tobacco: Never Used   • Tobacco comment: still uses 1.5 packs a day.  he has not smoked in approx 3 weeks.   Vaping Use   • Vaping Use: Never used   Substance and Sexual Activity   • Alcohol use: No   • Drug use: No   • Sexual activity: Defer     Partners: Female     Birth control/protection: None           Objective   Physical Exam  Vitals and nursing note reviewed.   Constitutional:       Appearance: He is well-developed.   HENT:      Head: Normocephalic.   Eyes:      Pupils: Pupils are equal, round, and reactive to light.   Cardiovascular:      Heart sounds: Normal heart sounds.   Pulmonary:      Effort: Pulmonary effort is normal.       Breath sounds: Normal breath sounds.   Abdominal:      Palpations: Abdomen is soft.   Musculoskeletal:         General: Normal range of motion.      Cervical back: Normal range of motion.   Skin:     General: Skin is warm.      Capillary Refill: Capillary refill takes less than 2 seconds.   Neurological:      General: No focal deficit present.      Mental Status: He is alert.   Psychiatric:         Mood and Affect: Mood normal.         Procedures           ED Course                                           MDM    Final diagnoses:   Seizure (CMS/HCC)       ED Disposition  ED Disposition     ED Disposition Condition Comment    Discharge Stable           Tiera Valenzuela, APRN  [address] [facility] [address] [phone]    Schedule an appointment as soon as possible for a visit   If symptoms worsen         Medication List      No changes were made to your prescriptions during this visit.          Jerry Dwyer MD  04/12/21 8613

## 2021-04-12 NOTE — ASSESSMENT & PLAN NOTE
Continue Remeron 45 mg.  Continue to work on sleep hygiene.  Encouraged to decrease any caffeine or energy drink intake

## 2021-04-15 LAB
QT INTERVAL: 352 MS
QTC INTERVAL: 418 MS

## 2021-05-26 DIAGNOSIS — K21.9 GASTROESOPHAGEAL REFLUX DISEASE WITHOUT ESOPHAGITIS: ICD-10-CM

## 2021-05-26 RX ORDER — DEXLANSOPRAZOLE 60 MG/1
60 CAPSULE, DELAYED RELEASE ORAL DAILY
Qty: 30 CAPSULE | Refills: 5 | Status: SHIPPED | OUTPATIENT
Start: 2021-05-26 | End: 2021-12-20 | Stop reason: SDUPTHER

## 2021-05-26 NOTE — TELEPHONE ENCOUNTER
Caller: [facility] [address] - [phone] [facility] [phone] FX    Relationship: Pharmacy    Best call back number: [phone]    Medication needed:   Requested Prescriptions     Pending Prescriptions Disp Refills   • dexlansoprazole (Dexilant) 60 MG capsule 30 capsule 5     Sig: Take 1 capsule by mouth Daily.       When do you need the refill by: ASAP    What additional details did the patient provide when requesting the medication: PHARMACY STATES THAT THIS MEDICATION IS ON BACK ORDER. PHARMACY STATES THAT ANOTHER FORM OF THIS MEDICATION NEEDS TO BE SENT IT.    Does the patient have less than a 3 day supply:  [x] Yes  [] No    What is the patient's preferred pharmacy: [facility] [address] - [phone] [facility] [phone] FX

## 2021-06-01 ENCOUNTER — TELEPHONE (OUTPATIENT)
Dept: FAMILY MEDICINE CLINIC | Facility: CLINIC | Age: 51
End: 2021-06-01

## 2021-06-10 ENCOUNTER — HOSPITAL ENCOUNTER (OUTPATIENT)
Dept: GENERAL RADIOLOGY | Facility: HOSPITAL | Age: 51
Discharge: HOME | End: 2021-06-10
Admitting: NURSE PRACTITIONER

## 2021-06-10 ENCOUNTER — APPOINTMENT (OUTPATIENT)
Dept: CT IMAGING | Facility: HOSPITAL | Age: 51
End: 2021-06-10

## 2021-06-10 ENCOUNTER — OFFICE VISIT (OUTPATIENT)
Dept: FAMILY MEDICINE CLINIC | Facility: CLINIC | Age: 51
End: 2021-06-10

## 2021-06-10 ENCOUNTER — HOSPITAL ENCOUNTER (EMERGENCY)
Facility: HOSPITAL | Age: 51
Discharge: HOME | End: 2021-06-10
Attending: STUDENT IN AN ORGANIZED HEALTH CARE EDUCATION/TRAINING PROGRAM | Admitting: EMERGENCY MEDICINE

## 2021-06-10 VITALS
HEIGHT: 67 IN | WEIGHT: 232 LBS | RESPIRATION RATE: 18 BRPM | BODY MASS INDEX: 36.41 KG/M2 | HEART RATE: 75 BPM | DIASTOLIC BLOOD PRESSURE: 95 MMHG | SYSTOLIC BLOOD PRESSURE: 130 MMHG | OXYGEN SATURATION: 98 % | TEMPERATURE: 98.4 F

## 2021-06-10 VITALS
WEIGHT: 236 LBS | TEMPERATURE: 97.1 F | BODY MASS INDEX: 37.04 KG/M2 | OXYGEN SATURATION: 98 % | HEART RATE: 82 BPM | SYSTOLIC BLOOD PRESSURE: 120 MMHG | DIASTOLIC BLOOD PRESSURE: 74 MMHG | HEIGHT: 67 IN

## 2021-06-10 DIAGNOSIS — K38.9 APPENDICOLITH: ICD-10-CM

## 2021-06-10 DIAGNOSIS — R20.9 SENSATION OF COLD IN LOWER EXTREMITY: ICD-10-CM

## 2021-06-10 DIAGNOSIS — E55.9 VITAMIN D DEFICIENCY: ICD-10-CM

## 2021-06-10 DIAGNOSIS — J30.1 SEASONAL ALLERGIC RHINITIS DUE TO POLLEN: ICD-10-CM

## 2021-06-10 DIAGNOSIS — R10.31 RLQ ABDOMINAL PAIN: Primary | ICD-10-CM

## 2021-06-10 DIAGNOSIS — J45.20 MILD INTERMITTENT ASTHMA WITHOUT COMPLICATION: ICD-10-CM

## 2021-06-10 DIAGNOSIS — I83.813 VARICOSE VEINS OF BOTH LOWER EXTREMITIES WITH PAIN: ICD-10-CM

## 2021-06-10 DIAGNOSIS — R10.33 PERIUMBILICAL ABDOMINAL PAIN: Primary | ICD-10-CM

## 2021-06-10 DIAGNOSIS — R10.31 ABDOMINAL PAIN, RLQ: Primary | ICD-10-CM

## 2021-06-10 LAB
6-ACETYL MORPHINE: NEGATIVE
ALBUMIN SERPL-MCNC: 4.07 G/DL (ref 3.5–5.2)
ALBUMIN/GLOB SERPL: 1.1 G/DL
ALP SERPL-CCNC: 98 U/L (ref 39–117)
ALT SERPL W P-5'-P-CCNC: 21 U/L (ref 1–41)
AMPHET+METHAMPHET UR QL: NEGATIVE
ANION GAP SERPL CALCULATED.3IONS-SCNC: 10.3 MMOL/L (ref 5–15)
AST SERPL-CCNC: 32 U/L (ref 1–40)
BARBITURATES UR QL SCN: NEGATIVE
BASOPHILS # BLD AUTO: 0.03 10*3/MM3 (ref 0–0.2)
BASOPHILS NFR BLD AUTO: 0.3 % (ref 0–1.5)
BENZODIAZ UR QL SCN: NEGATIVE
BILIRUB SERPL-MCNC: 0.2 MG/DL (ref 0–1.2)
BILIRUB UR QL STRIP: NEGATIVE
BUN SERPL-MCNC: 13 MG/DL (ref 6–20)
BUN/CREAT SERPL: 10.7 (ref 7–25)
BUPRENORPHINE SERPL-MCNC: NEGATIVE NG/ML
CALCIUM SPEC-SCNC: 9.1 MG/DL (ref 8.6–10.5)
CANNABINOIDS SERPL QL: NEGATIVE
CHLORIDE SERPL-SCNC: 105 MMOL/L (ref 98–107)
CLARITY UR: CLEAR
CO2 SERPL-SCNC: 23.7 MMOL/L (ref 22–29)
COCAINE UR QL: NEGATIVE
COLOR UR: YELLOW
CREAT SERPL-MCNC: 1.22 MG/DL (ref 0.76–1.27)
DEPRECATED RDW RBC AUTO: 43 FL (ref 37–54)
EOSINOPHIL # BLD AUTO: 0.09 10*3/MM3 (ref 0–0.4)
EOSINOPHIL NFR BLD AUTO: 1 % (ref 0.3–6.2)
ERYTHROCYTE [DISTWIDTH] IN BLOOD BY AUTOMATED COUNT: 12.9 % (ref 12.3–15.4)
GFR SERPL CREATININE-BSD FRML MDRD: 63 ML/MIN/1.73
GLOBULIN UR ELPH-MCNC: 3.6 GM/DL
GLUCOSE SERPL-MCNC: 97 MG/DL (ref 65–99)
GLUCOSE UR STRIP-MCNC: NEGATIVE MG/DL
HCT VFR BLD AUTO: 42.9 % (ref 37.5–51)
HGB BLD-MCNC: 15.3 G/DL (ref 13–17.7)
HGB UR QL STRIP.AUTO: NEGATIVE
IMM GRANULOCYTES # BLD AUTO: 0.02 10*3/MM3 (ref 0–0.05)
IMM GRANULOCYTES NFR BLD AUTO: 0.2 % (ref 0–0.5)
KETONES UR QL STRIP: NEGATIVE
LEUKOCYTE ESTERASE UR QL STRIP.AUTO: NEGATIVE
LIPASE SERPL-CCNC: 34 U/L (ref 13–60)
LYMPHOCYTES # BLD AUTO: 2.52 10*3/MM3 (ref 0.7–3.1)
LYMPHOCYTES NFR BLD AUTO: 27.6 % (ref 19.6–45.3)
MCH RBC QN AUTO: 32.8 PG (ref 26.6–33)
MCHC RBC AUTO-ENTMCNC: 35.7 G/DL (ref 31.5–35.7)
MCV RBC AUTO: 91.9 FL (ref 79–97)
METHADONE UR QL SCN: NEGATIVE
MONOCYTES # BLD AUTO: 1.1 10*3/MM3 (ref 0.1–0.9)
MONOCYTES NFR BLD AUTO: 12.1 % (ref 5–12)
NEUTROPHILS NFR BLD AUTO: 5.36 10*3/MM3 (ref 1.7–7)
NEUTROPHILS NFR BLD AUTO: 58.8 % (ref 42.7–76)
NITRITE UR QL STRIP: NEGATIVE
NRBC BLD AUTO-RTO: 0 /100 WBC (ref 0–0.2)
OPIATES UR QL: NEGATIVE
OXYCODONE UR QL SCN: NEGATIVE
PCP UR QL SCN: NEGATIVE
PH UR STRIP.AUTO: 5.5 [PH] (ref 5–8)
PLATELET # BLD AUTO: 199 10*3/MM3 (ref 140–450)
PMV BLD AUTO: 9.6 FL (ref 6–12)
POTASSIUM SERPL-SCNC: 4 MMOL/L (ref 3.5–5.2)
PROT SERPL-MCNC: 7.7 G/DL (ref 6–8.5)
PROT UR QL STRIP: NEGATIVE
RBC # BLD AUTO: 4.67 10*6/MM3 (ref 4.14–5.8)
SODIUM SERPL-SCNC: 139 MMOL/L (ref 136–145)
SP GR UR STRIP: 1.01 (ref 1–1.03)
UROBILINOGEN UR QL STRIP: NORMAL
WBC # BLD AUTO: 9.12 10*3/MM3 (ref 3.4–10.8)

## 2021-06-10 PROCEDURE — 74018 RADEX ABDOMEN 1 VIEW: CPT | Performed by: RADIOLOGY

## 2021-06-10 PROCEDURE — 80307 DRUG TEST PRSMV CHEM ANLYZR: CPT | Performed by: PHYSICIAN ASSISTANT

## 2021-06-10 PROCEDURE — 99283 EMERGENCY DEPT VISIT LOW MDM: CPT

## 2021-06-10 PROCEDURE — 99214 OFFICE O/P EST MOD 30 MIN: CPT | Performed by: NURSE PRACTITIONER

## 2021-06-10 PROCEDURE — 74176 CT ABD & PELVIS W/O CONTRAST: CPT

## 2021-06-10 PROCEDURE — 80053 COMPREHEN METABOLIC PANEL: CPT | Performed by: PHYSICIAN ASSISTANT

## 2021-06-10 PROCEDURE — 74018 RADEX ABDOMEN 1 VIEW: CPT

## 2021-06-10 PROCEDURE — 83690 ASSAY OF LIPASE: CPT | Performed by: PHYSICIAN ASSISTANT

## 2021-06-10 PROCEDURE — 81003 URINALYSIS AUTO W/O SCOPE: CPT | Performed by: PHYSICIAN ASSISTANT

## 2021-06-10 PROCEDURE — 85025 COMPLETE CBC W/AUTO DIFF WBC: CPT | Performed by: PHYSICIAN ASSISTANT

## 2021-06-10 RX ORDER — ERGOCALCIFEROL 1.25 MG/1
50000 CAPSULE ORAL
Qty: 5 CAPSULE | Refills: 5 | Status: SHIPPED | OUTPATIENT
Start: 2021-06-10 | End: 2021-12-20 | Stop reason: SDUPTHER

## 2021-06-10 RX ORDER — POTASSIUM CHLORIDE 750 MG/1
10 TABLET, FILM COATED, EXTENDED RELEASE ORAL DAILY
Qty: 30 TABLET | Refills: 5 | Status: SHIPPED | OUTPATIENT
Start: 2021-06-10 | End: 2021-12-16

## 2021-06-10 RX ORDER — DICYCLOMINE HYDROCHLORIDE 10 MG/1
20 CAPSULE ORAL EVERY 8 HOURS PRN
Qty: 20 CAPSULE | Refills: 0 | Status: SHIPPED | OUTPATIENT
Start: 2021-06-10 | End: 2021-07-01 | Stop reason: SDUPTHER

## 2021-06-10 RX ORDER — MONTELUKAST SODIUM 10 MG/1
10 TABLET ORAL NIGHTLY
Qty: 30 TABLET | Refills: 5 | Status: SHIPPED | OUTPATIENT
Start: 2021-06-10 | End: 2021-12-16

## 2021-06-10 RX ORDER — ASPIRIN 81 MG/1
81 TABLET ORAL DAILY
Qty: 30 TABLET | Refills: 3 | Status: SHIPPED | OUTPATIENT
Start: 2021-06-10 | End: 2022-02-03

## 2021-06-10 RX ORDER — LORATADINE 10 MG/1
10 TABLET ORAL DAILY PRN
Qty: 30 TABLET | Refills: 5 | Status: SHIPPED | OUTPATIENT
Start: 2021-06-10 | End: 2021-12-16

## 2021-06-10 RX ORDER — SODIUM CHLORIDE 0.9 % (FLUSH) 0.9 %
10 SYRINGE (ML) INJECTION AS NEEDED
Status: DISCONTINUED | OUTPATIENT
Start: 2021-06-10 | End: 2021-06-11 | Stop reason: HOSPADM

## 2021-06-10 RX ORDER — ALBUTEROL SULFATE 90 UG/1
2 AEROSOL, METERED RESPIRATORY (INHALATION) EVERY 4 HOURS PRN
Qty: 18 G | Refills: 5 | Status: SHIPPED | OUTPATIENT
Start: 2021-06-10 | End: 2022-02-03

## 2021-06-10 RX ADMIN — SODIUM CHLORIDE 1000 ML: 9 INJECTION, SOLUTION INTRAVENOUS at 21:30

## 2021-06-10 NOTE — PROGRESS NOTES
"Subjective   Jaquan Mckay is a 50 y.o. male.     Chief Complaint   Patient presents with   • Hypertension       History of Present Illness     Abdominal pain-reports that he has been having abdomen pain and diarrhea for at least a month.  Reports cramping below his umbilicus.  He reports that every thing \"I eat goes thru me\".  He reports that he has been approx 4 times today. He is not on any new meds.  He reports prior to onset he was having constipation.  He reports after that he is having difficulty holding and \"it just shoots out of me\".   No nausea.  He reports feels \"like tearing inside\".  No vomiting.  He has not noted any urine changes.  His appetite has not changed.  He denies sensation of being bloated.  He reports at present it is \"right dead in the center and shooting down\" into \"my privates\" primarily the left testicle.  No hematuria or blood in stool.  He reports \"different colors\".   Anxiety meds-reports \"the last one gave me a bad rash\".  He has stopped the medications.   He reports he is currently having more anxiety but he is worried about why he is having significant abdominal pain  Insomnia-reports remeron is not helping any longer with sleep.  He is taking 45 mg of meds.  He has failed other meds for insomnia including Restoril.  He has had poor sleep habits for multiple years.    HA-has been more increased but he feels it is because he is off his allergy injections and the pollen count has been more elevated.  He has not had an allergy injection due to not \"covering his nose\" with mask at that office and they would not allow him to come inside.   Leg concern-he has noted increased varicosities and decreased warmth of his BLE.  Worse around his left ankle but he has noted increase over his right tib/fib area.  He reports he is wearing \"2 pair of socks\" for warmth.  He has not noted any edema.  No erythema.  No known injuries.   Vitamin D deficiency-chronic and ongoing.  Patient is presently " "taking vitamin D 50,000 units supplement.  No negative side effects of medication are reported.  Vitamin D needs a refill today    The following portions of the patient's history were reviewed and updated as appropriate: CC, ROS, allergies, current medications, past family history, past medical history, past social history, past surgical history and problem list.      Review of Systems   Constitutional: Negative for activity change, appetite change, fatigue and fever.   HENT: Positive for sinus pressure. Negative for congestion, ear pain, facial swelling, nosebleeds, rhinorrhea, sore throat and trouble swallowing.    Eyes: Negative for blurred vision, double vision and redness.   Respiratory: Negative for cough, chest tightness, shortness of breath and wheezing.    Cardiovascular: Negative for chest pain, palpitations and leg swelling.   Gastrointestinal: Positive for abdominal pain and diarrhea. Negative for blood in stool and constipation.   Endocrine: Negative.    Genitourinary: Negative for dysuria, flank pain, frequency, hematuria and urgency.   Musculoskeletal: Negative.    Skin: Negative.    Allergic/Immunologic: Negative.    Neurological: Positive for dizziness. Negative for weakness, light-headedness and headache.   Hematological: Negative.    Psychiatric/Behavioral: Negative for self-injury, sleep disturbance and suicidal ideas. The patient is not nervous/anxious.    All other systems reviewed and are negative.      Objective     /74   Pulse 82   Temp 97.1 °F (36.2 °C)   Ht 170.2 cm (67.01\")   Wt 107 kg (236 lb)   SpO2 98%   BMI 36.95 kg/m²     Physical Exam  Vitals reviewed.   Constitutional:       General: He is not in acute distress.     Appearance: He is well-developed. He is not diaphoretic.   HENT:      Head: Normocephalic and atraumatic.      Comments: Oropharynx not examined.  Patient is presently wearing a face covering/mask due to COVID-19 pandemic.     Right Ear: Hearing, tympanic " membrane, ear canal and external ear normal.      Left Ear: Hearing, tympanic membrane, ear canal and external ear normal.   Eyes:      General: Lids are normal. No scleral icterus.     Extraocular Movements:      Right eye: Normal extraocular motion and no nystagmus.      Left eye: Normal extraocular motion and no nystagmus.      Conjunctiva/sclera: Conjunctivae normal.      Pupils: Pupils are equal, round, and reactive to light.   Neck:      Thyroid: No thyromegaly.      Vascular: No carotid bruit or JVD.      Trachea: No tracheal tenderness.   Cardiovascular:      Rate and Rhythm: Normal rate and regular rhythm.      Heart sounds: Normal heart sounds, S1 normal and S2 normal. No murmur heard.     Pulmonary:      Effort: Pulmonary effort is normal.      Breath sounds: Normal breath sounds.   Chest:      Chest wall: No tenderness.   Abdominal:      General: Bowel sounds are normal.      Palpations: Abdomen is soft. There is no mass.      Tenderness: There is generalized abdominal tenderness. There is guarding. There is no rebound.      Hernia: No hernia is present.      Comments: Patient holding to abdomen with obvious discomfort   Musculoskeletal:         General: No tenderness.      Cervical back: Normal range of motion and neck supple.      Right lower leg: No edema.      Left lower leg: No edema.      Comments: No muscular atrophy or flaccidity.  Gait moderately antalgic   Lymphadenopathy:      Cervical: No cervical adenopathy.      Right cervical: No superficial cervical adenopathy.     Left cervical: No superficial cervical adenopathy.   Skin:     General: Skin is warm and dry.      Capillary Refill: Capillary refill takes less than 2 seconds.      Coloration: Skin is not pale.      Findings: No erythema.      Nails: There is no clubbing.      Comments: Multiple BLE varicosities   Neurological:      Mental Status: He is alert and oriented to person, place, and time.      Cranial Nerves: No cranial nerve  deficit or facial asymmetry.      Sensory: No sensory deficit.      Motor: No tremor, atrophy or abnormal muscle tone.      Coordination: Coordination normal.      Deep Tendon Reflexes: Reflexes are normal and symmetric.   Psychiatric:         Attention and Perception: He is attentive.         Mood and Affect: Mood normal.         Speech: Speech normal.         Behavior: Behavior normal.         Thought Content: Thought content normal.         Judgment: Judgment normal.       Assessment/Plan     Diagnoses and all orders for this visit:    1. Periumbilical abdominal pain (Primary)  -     XR Abdomen KUB    2. Mild intermittent asthma without complication  -     montelukast (SINGULAIR) 10 MG tablet; Take 1 tablet by mouth Every Night.  Dispense: 30 tablet; Refill: 5  -     albuterol sulfate  (90 Base) MCG/ACT inhaler; Inhale 2 puffs Every 4 (Four) Hours As Needed for Wheezing.  Dispense: 18 g; Refill: 5    3. Seasonal allergic rhinitis due to pollen  -     montelukast (SINGULAIR) 10 MG tablet; Take 1 tablet by mouth Every Night.  Dispense: 30 tablet; Refill: 5  -     loratadine (CLARITIN) 10 MG tablet; Take 1 tablet by mouth Daily As Needed for Allergies.  Dispense: 30 tablet; Refill: 5    4. Sensation of cold in lower extremity  -     US Venous Doppler Lower Extremity Bilateral (duplex)  -     US Arterial Doppler Lower Extremity Bilateral    5. Varicose veins of both lower extremities with pain  -     US Venous Doppler Lower Extremity Bilateral (duplex)  -     US Arterial Doppler Lower Extremity Bilateral    6. Vitamin D deficiency  Assessment & Plan:  Continue vitamin D as directed.  Report any negative side effects.  We will continue to monitor vitamin D labs and adjust supplement if necessary.    Orders:  -     vitamin D (ERGOCALCIFEROL) 1.25 MG (93533 UT) capsule capsule; Take 1 capsule by mouth Every 7 (Seven) Days. On Friday  Dispense: 5 capsule; Refill: 5    Other orders  -     potassium chloride 10 MEQ  CR tablet; Take 1 tablet by mouth Daily.  Dispense: 30 tablet; Refill: 5  -     aspirin (Aspirin Adult Low Strength) 81 MG EC tablet; Take 1 tablet by mouth Daily.  Dispense: 30 tablet; Refill: 3      Understands disease processes and need for medications.  Understands reasons for urgent and emergent care.  Patient (& family) verbalized agreement for treatment plan.   Emotional support and active listening provided.  Patient provided time to verbalize feelings.    Refills on routine medications that are out of refills today    Declines for provider to call EMS even tho he is obviously uncomfortable.  STAT xray for abdomen pain to rule out obvious blockage    Will plan for STAT US of abdomen if Abd series is WNL  RTC next week.            This document has been electronically signed by:  BALDOMERO Thao, FNP-C    Dragon disclaimer:  Much of this encounter note is an electronic transcription/translation of spoken language to printed text. The electronic translation of spoken language may permit erroneous, or at times, nonsensical words or phrases to be inadvertently transcribed; Although I have reviewed the note for such errors, some may still exist.

## 2021-06-11 ENCOUNTER — TELEPHONE (OUTPATIENT)
Dept: FAMILY MEDICINE CLINIC | Facility: CLINIC | Age: 51
End: 2021-06-11

## 2021-06-11 RX ORDER — HYOSCYAMINE SULFATE 0.125 MG
0.12 TABLET ORAL EVERY 6 HOURS PRN
Qty: 12 TABLET | Refills: 0 | Status: SHIPPED | OUTPATIENT
Start: 2021-06-11 | End: 2021-07-14

## 2021-06-11 NOTE — TELEPHONE ENCOUNTER
Bentyl prescribed at ED lastnight is not covered by patients insurance. He is requesting medication be sent in. He said that he is cramping bad.

## 2021-06-11 NOTE — ED PROVIDER NOTES
Subjective   50-year-old male who presents to the emergency department chief complaint right lower quadrant abdominal pain.  Patient states he had sharp, stabbing pain.  This is been present for the last 2 days.  Denies any dysuria, nausea, vomiting.      History provided by:  Patient   used: No    Abdominal Pain  Pain location:  RLQ  Pain quality: aching and sharp    Pain radiates to:  Does not radiate  Pain severity:  Moderate  Onset quality:  Gradual  Duration:  1 day  Timing:  Intermittent  Progression:  Worsening  Chronicity:  New  Context: not alcohol use, not awakening from sleep, not diet changes, not eating, not medication withdrawal, not recent illness, not recent sexual activity, not recent travel, not retching, not sick contacts and not suspicious food intake    Relieved by:  Nothing  Worsened by:  Nothing  Ineffective treatments:  None tried  Associated symptoms: no anorexia, no chest pain, no chills, no constipation, no dysuria, no fatigue, no fever, no hematemesis, no hematochezia, no hematuria, no melena, no nausea, no shortness of breath, no sore throat, no vaginal bleeding and no vaginal discharge    Risk factors: no alcohol abuse, no aspirin use, not elderly, no NSAID use, not obese and not pregnant        Review of Systems   Constitutional: Negative.  Negative for activity change, appetite change, chills, fatigue and fever.   HENT: Negative for sore throat.    Eyes: Negative.  Negative for pain, discharge and itching.   Respiratory: Negative.  Negative for apnea, chest tightness and shortness of breath.    Cardiovascular: Negative.  Negative for chest pain.   Gastrointestinal: Positive for abdominal distention and abdominal pain. Negative for anorexia, constipation, hematemesis, hematochezia, melena and nausea.   Endocrine: Negative.  Negative for cold intolerance, heat intolerance, polydipsia and polyphagia.   Genitourinary: Negative for difficulty urinating, dysuria, flank  "pain, frequency, genital sores, hematuria, vaginal bleeding and vaginal discharge.   Musculoskeletal: Negative.  Negative for arthralgias, gait problem, joint swelling and myalgias.   Skin: Negative.  Negative for color change and pallor.   Neurological: Negative.  Negative for dizziness, facial asymmetry, speech difficulty, light-headedness, numbness and headaches.   Hematological: Negative.  Does not bruise/bleed easily.   Psychiatric/Behavioral: Negative.    All other systems reviewed and are negative.      Past Medical History:   Diagnosis Date   • Allergic    • Anxiety    • Arthritis    • Asthma    • Body piercing     REPORTS CYLICONE IN EARS   • Clotting disorder (CMS/[facility]) 2004    had a knee surgery   • Coronary artery disease    • Depression    • DVT (deep venous thrombosis) (CMS/[facility])     RIGHT RIGHT KNEE AFTER SURGERY YEARS AGO IN 2001 OR 2004   • Elevated cholesterol    • Gastric ulcer    • GERD (gastroesophageal reflux disease)    • H/O migraine    • Headache    • Heart attack (CMS/[facility])     REPORTS \"LIGHT HEART ATTACK A LONG TIME AGO\"  \"EARLY 90'S\"   • History of seizures     REPORTS LAST EPISODE WAS AROUND 1995.   • Hostility    • Hyperlipidemia    • Hypertension    • Knee pain, acute     Left   • Low back pain    • Lyme disease    • Migraine    • MRSA (methicillin resistant Staphylococcus aureus)     REPORTS LAST TESTED + 2004. WAS TREATED HE REPORTS.  RIGHT ARM, RIGHT KNEE.   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    • Obesity    • Poor historian    • [place] spotted fever    • Seizures (CMS/[facility])    • Sleep apnea    • Tattoo    • Wears glasses        Allergies   Allergen Reactions   • Ciprofloxacin Anaphylaxis and Hives   • Mobic [Meloxicam] Other (See Comments)     Pt states, \"It make my feet and hands go numb and I can't hardly walk.\"    • Paxil [Paroxetine Hcl] Shortness Of Breath     Chest pain    • Peanut-Containing Drug Products Anaphylaxis   • Penicillins Anaphylaxis   • Pristiq [Desvenlafaxine Succinate Er] " Dizziness   • Sulfa Antibiotics Anaphylaxis, Itching and Rash   • Doxycycline Hives   • Fish-Derived Products Hives   • Isosorbide Nitrate Rash     Rash, hives, had to use inhaler.    • Movantik [Naloxegol] Rash   • Buspar [Buspirone] Rash   • Clarithromycin Rash   • Clindamycin/Lincomycin Rash   • Codeine Rash   • Contrast Dye Itching and Rash   • Diltiazem Rash   • Gabapentin Rash   • Keflex [Cephalexin] Rash   • Linzess [Linaclotide] Rash   • Metoprolol Rash   • Prednisone Rash and Other (See Comments)     Face, feet, and legs go completely numb per patient   • Robitussin Cough+ Chest Max St [Dextromethorphan-Guaifenesin] Itching   • Shrimp (Diagnostic) Rash   • Spironolactone Rash   • Viibryd [Vilazodone Hcl] Itching and Rash   • Zoloft [Sertraline Hcl] Hives and Itching       Past Surgical History:   Procedure Laterality Date   • ABDOMINAL SURGERY     • BACK SURGERY     • BRAIN SURGERY  1986    Tumor removal    • CARDIAC CATHETERIZATION N/A 9/28/2018    Procedure: Left Heart Cath;  Surgeon: Leandro Daily MD;  Location: [facility] CATH INVASIVE LOCATION;  Service: Cardiology   • CHOLECYSTECTOMY     • COLONOSCOPY     • CYST REMOVAL      pilonidal cyst   • ELBOW EPICONDYLECTOMY Right 7/23/2020    Procedure: LATERAL EPICONDYLAR RELEASE;  Surgeon: Jose Ruiz MD;  Location: [facility] OR;  Service: Orthopedics;  Laterality: Right;   • ENDOSCOPY     • FRACTURE SURGERY Right     elbow   • KNEE ARTHROSCOPY Left 10/20/2017    Procedure: Diagnostic arthroscopy left knee with chondroplasty;  Surgeon: Marco Aguirre MD;  Location: [facility] OR;  Service:    • KNEE ARTHROSCOPY Left 1/11/2021    Procedure: KNEE DIAGNOSTIC ARTHROSCOPY WITH  CHONDROPLASTY patella, femoral and medial;  Surgeon: Raul Eagle MD;  Location: [facility] OR;  Service: Orthopedics;  Laterality: Left;   • KNEE SURGERY Right    • MOUTH SURGERY      FULL MOUTH EXTRACTION   • OTHER SURGICAL HISTORY      REPORTS 7 TICKS REMOVED FROM RIGHT ARM IN 2001  OR 2002   • TENNIS ELBOW RELEASE Right 7/23/2020    Procedure: RIGHT TENNIS ELBOW RELEASE;  Surgeon: Jose Ruiz MD;  Location: [facility];  Service: Orthopedics;  Laterality: Right;   • TUMOR EXCISION      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       Family History   Problem Relation Age of Onset   • Diabetes Mother    • Hypertension Mother    • Stroke Mother    • Diabetes Father    • Skin cancer Father    • Hypertension Father    • Heart attack Father    • Diabetes Brother    • Hypertension Brother    • Heart disease Maternal Aunt    • Heart disease Maternal Uncle    • Heart disease Paternal Aunt    • Heart disease Paternal Uncle    • Heart disease Maternal Grandmother    • Heart disease Maternal Grandfather    • Heart disease Paternal Grandmother    • Heart disease Paternal Grandfather        Social History     Socioeconomic History   • Marital status:      Spouse name: Becca   • Number of children: 2   • Years of education: 12   • Highest education level: Not on file   Tobacco Use   • Smoking status: Current Every Day Smoker     Packs/day: 1.50     Years: 17.00     Pack years: 25.50     Types: Cigars, Cigarettes     Start date: 5/5/2010   • Smokeless tobacco: Never Used   • Tobacco comment: still uses 1.5 packs a day.  he has not smoked in approx 3 weeks.   Vaping Use   • Vaping Use: Never used   Substance and Sexual Activity   • Alcohol use: No   • Drug use: No   • Sexual activity: Defer     Partners: Female     Birth control/protection: None           Objective   Physical Exam  Vitals and nursing note reviewed.   Constitutional:       General: He is not in acute distress.     Appearance: He is well-developed and normal weight. He is not ill-appearing, toxic-appearing or diaphoretic.   HENT:      Head: Normocephalic and atraumatic.      Mouth/Throat:      Mouth: Mucous membranes are moist.      Pharynx: Oropharynx is clear. No pharyngeal swelling or oropharyngeal exudate.   Eyes:       General: No scleral icterus.     Extraocular Movements: Extraocular movements intact.      Pupils: Pupils are equal, round, and reactive to light.   Cardiovascular:      Rate and Rhythm: Normal rate and regular rhythm.      Heart sounds: Normal heart sounds. No murmur heard.   No friction rub. No gallop.    Pulmonary:      Effort: Pulmonary effort is normal. No respiratory distress.      Breath sounds: Normal breath sounds. No stridor. No wheezing, rhonchi or rales.   Chest:      Chest wall: No tenderness.   Abdominal:      General: Abdomen is flat, scaphoid and protuberant. Bowel sounds are normal. There is no distension or abdominal bruit. There are no signs of injury.      Palpations: Abdomen is rigid. There is no shifting dullness, fluid wave, hepatomegaly, splenomegaly or mass.      Tenderness: There is abdominal tenderness in the right lower quadrant. There is guarding and rebound.      Hernia: No hernia is present. There is no hernia in the umbilical area, ventral area, left inguinal area, right femoral area, left femoral area or right inguinal area.   Skin:     General: Skin is warm and dry.      Coloration: Skin is not cyanotic, jaundiced, mottled or pale.      Findings: No erythema or rash.   Neurological:      General: No focal deficit present.      Mental Status: He is alert and oriented to person, place, and time.      Cranial Nerves: No cranial nerve deficit.      Motor: No weakness.   Psychiatric:         Mood and Affect: Mood normal. Mood is not anxious or depressed.         Behavior: Behavior normal.         Procedures           ED Course  ED Course as of Avinash 10 2242   Thu Avinash 10, 2021   2144 Patient being evaluated for an acute appendicitis.  CT scan ordered without contrast to further evaluate.  Rick Mehta will take over care.    []   2232 CT rad interpreted:  1.  Negative noncontrast CT imaging of the abdomen and pelvis.  2.  The appendix is normal.  3.  No nephrolithiasis or evidence of  urinary obstruction.  4.  Cholecystectomy.  5.  Right lower calcification seen on earlier x-ray study today represented a fecalith but has now passed to the midline pelvic sigmoid colon. This should be of no significance.  6.  Tiny fat-containing umbilical hernia.    [RB]      ED Course User Index  [BH] Ac Golden PA-C  [RB] Rick Mehta II, PA                                           MDM  Number of Diagnoses or Management Options  RLQ abdominal pain: established and worsening     Amount and/or Complexity of Data Reviewed  Clinical lab tests: ordered and reviewed  Tests in the radiology section of CPT®: ordered and reviewed  Discuss the patient with other providers: yes    Risk of Complications, Morbidity, and/or Mortality  Presenting problems: moderate  Diagnostic procedures: moderate  Management options: low    Patient Progress  Patient progress: stable      Final diagnoses:   RLQ abdominal pain       ED Disposition  ED Disposition     ED Disposition Condition Comment    Discharge Stable           Tiera Valenzuela, APRN  [address] [facility] [address]  [phone]    Schedule an appointment as soon as possible for a visit            Medication List      New Prescriptions    dicyclomine 20 MG tablet  Commonly known as: BENTYL  Take 1 tablet by mouth Every 8 (Eight) Hours As Needed (abd pain).           Where to Get Your Medications      You can get these medications from any pharmacy    Bring a paper prescription for each of these medications  · dicyclomine 20 MG tablet          Rick Mehta II, PA  06/10/21 [id number]

## 2021-06-11 NOTE — TELEPHONE ENCOUNTER
Patient is scheduled with Marisela Nix on 6/14/2021 at 1:15 pm. Patient notified and states verbal understanding.

## 2021-06-14 ENCOUNTER — OFFICE VISIT (OUTPATIENT)
Dept: GASTROENTEROLOGY | Facility: CLINIC | Age: 51
End: 2021-06-14

## 2021-06-14 ENCOUNTER — OFFICE VISIT (OUTPATIENT)
Dept: FAMILY MEDICINE CLINIC | Facility: CLINIC | Age: 51
End: 2021-06-14

## 2021-06-14 VITALS
WEIGHT: 237.2 LBS | HEIGHT: 67 IN | SYSTOLIC BLOOD PRESSURE: 132 MMHG | HEART RATE: 74 BPM | DIASTOLIC BLOOD PRESSURE: 84 MMHG | BODY MASS INDEX: 37.23 KG/M2 | OXYGEN SATURATION: 98 % | TEMPERATURE: 97.1 F

## 2021-06-14 VITALS
DIASTOLIC BLOOD PRESSURE: 79 MMHG | HEIGHT: 67 IN | WEIGHT: 236 LBS | SYSTOLIC BLOOD PRESSURE: 136 MMHG | BODY MASS INDEX: 37.04 KG/M2 | HEART RATE: 70 BPM

## 2021-06-14 DIAGNOSIS — R30.0 DYSURIA: ICD-10-CM

## 2021-06-14 DIAGNOSIS — K21.9 GASTROESOPHAGEAL REFLUX DISEASE WITHOUT ESOPHAGITIS: ICD-10-CM

## 2021-06-14 DIAGNOSIS — K38.9 APPENDICOLITH: Primary | ICD-10-CM

## 2021-06-14 DIAGNOSIS — K59.01 SLOW TRANSIT CONSTIPATION: ICD-10-CM

## 2021-06-14 DIAGNOSIS — K59.04 CHRONIC IDIOPATHIC CONSTIPATION: Primary | ICD-10-CM

## 2021-06-14 PROCEDURE — 99213 OFFICE O/P EST LOW 20 MIN: CPT | Performed by: PHYSICIAN ASSISTANT

## 2021-06-14 PROCEDURE — 99213 OFFICE O/P EST LOW 20 MIN: CPT | Performed by: NURSE PRACTITIONER

## 2021-06-14 RX ORDER — NITROFURANTOIN 25; 75 MG/1; MG/1
100 CAPSULE ORAL 2 TIMES DAILY
Qty: 30 CAPSULE | Refills: 0 | Status: SHIPPED | OUTPATIENT
Start: 2021-06-14 | End: 2021-06-30

## 2021-06-14 NOTE — PROGRESS NOTES
Subjective   Jaquan Mckay is a 50 y.o. male.     Chief Complaint   Patient presents with   • Abdominal Pain       History of Present Illness     Abdomen pain-continues to be present.  Reports that he continues to have LBP and pelvic pain.  He reports he is chilling for the last 2 days. WBC WNL on 06/10/2021 at ED visit.  Noted Appendicolith on last KUB moved to sigmoid colon.  He reports he has noted dark urine.  He reports burning and dysuria today.  Bowels have not moved well.  He reports pressure over his bladder.  Some nausea but not vomiting.  Dizziness is present.  He reports that he did receive fluids at [facility] [place] ED on 06/10/2021.  He reports even with minimal intake he is noting abdominal cramps.       The following portions of the patient's history were reviewed and updated as appropriate: CC, ROS, allergies, current medications, past family history, past medical history, past social history, past surgical history and problem list.      Review of Systems   Constitutional: Negative for activity change, appetite change, fatigue, fever, unexpected weight gain and unexpected weight loss.   HENT: Negative for congestion, ear pain, postnasal drip, rhinorrhea, sore throat, swollen glands, trouble swallowing and voice change.    Eyes: Negative for pain and visual disturbance.   Respiratory: Negative for cough, chest tightness, shortness of breath and wheezing.    Cardiovascular: Negative for chest pain, palpitations and leg swelling.   Gastrointestinal: Positive for abdominal pain and diarrhea. Negative for blood in stool, constipation, nausea and indigestion.   Genitourinary: Negative for dysuria, hematuria and urgency.   Musculoskeletal: Positive for back pain. Negative for arthralgias, gait problem, joint swelling and myalgias.   Skin: Negative for color change and skin lesions.   Allergic/Immunologic: Negative.    Neurological: Negative for dizziness, numbness, headache and confusion.   Hematological: Negative.   "  Psychiatric/Behavioral: Negative for dysphoric mood, self-injury, sleep disturbance and suicidal ideas. The patient is not nervous/anxious.    All other systems reviewed and are negative.      Objective     /84   Pulse 74   Temp 97.1 °F (36.2 °C)   Ht 170.2 cm (67.01\")   Wt 108 kg (237 lb 3.2 oz)   SpO2 98%   BMI 37.14 kg/m²     Physical Exam  Vitals reviewed.   Constitutional:       General: He is not in acute distress.     Appearance: Normal appearance. He is well-developed.   HENT:      Head: Normocephalic and atraumatic.      Comments: Oropharynx not examined.  Patient is presently wearing a face covering/mask due to COVID-19 pandemic.     Right Ear: Ear canal and external ear normal.      Left Ear: Tympanic membrane, ear canal and external ear normal.   Eyes:      General: No scleral icterus.     Pupils: Pupils are equal, round, and reactive to light.   Neck:      Thyroid: No thyromegaly.      Vascular: No JVD.   Cardiovascular:      Rate and Rhythm: Normal rate and regular rhythm.      Heart sounds: Normal heart sounds.   Pulmonary:      Effort: Pulmonary effort is normal.      Breath sounds: Normal breath sounds.   Abdominal:      General: Bowel sounds are decreased. There is distension.      Palpations: Abdomen is soft.      Tenderness: There is generalized abdominal tenderness and tenderness in the right lower quadrant, periumbilical area, suprapubic area and left lower quadrant. There is rebound.   Musculoskeletal:         General: Tenderness present.      Cervical back: Normal range of motion and neck supple.   Skin:     General: Skin is warm and dry.      Capillary Refill: Capillary refill takes less than 2 seconds.   Neurological:      Mental Status: He is alert and oriented to person, place, and time.      Cranial Nerves: No cranial nerve deficit.      Coordination: Coordination normal.      Gait: Gait normal.   Psychiatric:         Behavior: Behavior normal.         Thought Content: " Thought content normal.         Judgment: Judgment normal.       Assessment/Plan     Diagnoses and all orders for this visit:    1. Appendicolith (Primary)  -     magnesium citrate solution; Take 296 mL by mouth 1 (One) Time for 1 dose.  Dispense: 296 mL; Refill: 0    2. Slow transit constipation  Comments:  Reviewed patient's most recent KUB.  Discussed with the patient importance of regular bowel care.  We will plan for mag citrate to help cleanse colon.  Orders:  -     magnesium citrate solution; Take 296 mL by mouth 1 (One) Time for 1 dose.  Dispense: 296 mL; Refill: 0    3. Dysuria  Comments:  Push fluids.  Prescription for Macrobid given  Orders:  -     nitrofurantoin, macrocrystal-monohydrate, (Macrobid) 100 MG capsule; Take 1 capsule by mouth 2 (Two) Times a Day.  Dispense: 30 capsule; Refill: 0         Reviewed patient's CT from the emergency department  Understands disease processes and need for medications.  Understands reasons for urgent and emergent care.  Patient (& family) verbalized agreement for treatment plan.   Emotional support and active listening provided.  Patient provided time to verbalize feelings.    If not holding PO by tomorrow or if decrease in UOP, report to office for orders for IVF hydration.    Small amounts of Pedialyte, Gatorade, or Powerade.  1-2 oz. Q 1 hour as tolerated.  PRN meds for nausea.   Will plan for colon clean out.    RTC 1 month, sooner if needed.               This document has been electronically signed by:  BALDOMERO Thao, FNP-C    Dragon disclaimer:  Much of this encounter note is an electronic transcription/translation of spoken language to printed text. The electronic translation of spoken language may permit erroneous, or at times, nonsensical words or phrases to be inadvertently transcribed; Although I have reviewed the note for such errors, some may still exist.

## 2021-06-14 NOTE — PROGRESS NOTES
Chief Complaint   Patient presents with   • Constipation       Jaquan Mckay is a 50 y.o. male who presents to the office today for evaluation of Constipation  .    HPI  Patient presents the clinic for chronic constipation today.  He states for the last 1.5 months he has been having abdominal cramping-and also a stabbing-like pain in his lower abdomen.  He has had both a CT and a KUB done due to kidney stone like symptoms.  Both revealed a large volume of stool in the colon.  He states that he is not having regular bowel movements and either go several days in between bowel movements or will have diarrhea with urgency.  He does have abdominal bloating that is causing some discomfort.  His PCP called in Mylanta and mag citrate for him to do a colon cleanse to see if that would help with overflow diarrhea.  He denies nausea or vomiting, melena, bright red blood per rectum, and GERD symptoms.  His GERD is well controlled on his current medication.    Review of Systems   Constitutional: Negative for chills, fatigue and fever.   HENT: Negative for trouble swallowing.    Eyes: Negative.    Respiratory: Negative for cough, choking, chest tightness and shortness of breath.    Cardiovascular: Negative for chest pain.   Gastrointestinal: Positive for abdominal distention, abdominal pain, anal bleeding, blood in stool, constipation, diarrhea and nausea. Negative for rectal pain and vomiting.   Endocrine: Negative.    Genitourinary: Positive for hematuria. Negative for difficulty urinating.   Musculoskeletal: Positive for back pain. Negative for neck pain.   Skin: Negative for color change, pallor, rash and wound.   Allergic/Immunologic: Negative for environmental allergies and food allergies.   Neurological: Positive for headaches. Negative for dizziness and light-headedness.   Hematological: Does not bruise/bleed easily.   Psychiatric/Behavioral: Negative.        ACTIVE PROBLEMS:   Specialty Problems        Gastroenterology  "Problems    Gastroesophageal reflux disease without esophagitis        Rectal bleeding        Therapeutic opioid-induced constipation (OIC)              PAST MEDICAL HISTORY:  Past Medical History:   Diagnosis Date   • Allergic    • Anxiety    • Arthritis    • Asthma    • Body piercing     REPORTS CYLICONE IN EARS   • Clotting disorder (CMS/HCC) 2004    had a knee surgery   • Coronary artery disease    • Depression    • DVT (deep venous thrombosis) (CMS/HCC)     RIGHT RIGHT KNEE AFTER SURGERY YEARS AGO IN 2001 OR 2004   • Elevated cholesterol    • Gastric ulcer    • GERD (gastroesophageal reflux disease)    • H/O migraine    • Headache    • Heart attack (CMS/HCC)     REPORTS \"LIGHT HEART ATTACK A LONG TIME AGO\"  \"EARLY 90'S\"   • History of seizures     REPORTS LAST EPISODE WAS AROUND 1995.   • Hostility    • Hyperlipidemia    • Hypertension    • Knee pain, acute     Left   • Low back pain    • Lyme disease    • Migraine    • MRSA (methicillin resistant Staphylococcus aureus)     REPORTS LAST TESTED + 2004. WAS TREATED HE REPORTS.  RIGHT ARM, RIGHT KNEE.   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    • Obesity    • Poor historian    • [place] spotted fever    • Seizures (CMS/HCC)    • Sleep apnea    • Tattoo    • Wears glasses        SURGICAL HISTORY:  Past Surgical History:   Procedure Laterality Date   • ABDOMINAL SURGERY     • BACK SURGERY     • BRAIN SURGERY  1986    Tumor removal    • CARDIAC CATHETERIZATION N/A 9/28/2018    Procedure: Left Heart Cath;  Surgeon: Leandro Daily MD;  Location: [facility] CATH INVASIVE LOCATION;  Service: Cardiology   • CHOLECYSTECTOMY     • COLONOSCOPY     • CYST REMOVAL      pilonidal cyst   • ELBOW EPICONDYLECTOMY Right 7/23/2020    Procedure: LATERAL EPICONDYLAR RELEASE;  Surgeon: Jose Ruiz MD;  Location: [facility] OR;  Service: Orthopedics;  Laterality: Right;   • ENDOSCOPY     • FRACTURE SURGERY Right     elbow   • KNEE ARTHROSCOPY Left 10/20/2017    Procedure: Diagnostic " arthroscopy left knee with chondroplasty;  Surgeon: Marco Aguirre MD;  Location: [facility] OR;  Service:    • KNEE ARTHROSCOPY Left 1/11/2021    Procedure: KNEE DIAGNOSTIC ARTHROSCOPY WITH  CHONDROPLASTY patella, femoral and medial;  Surgeon: Raul Eagle MD;  Location: [facility] OR;  Service: Orthopedics;  Laterality: Left;   • KNEE SURGERY Right    • MOUTH SURGERY      FULL MOUTH EXTRACTION   • OTHER SURGICAL HISTORY      REPORTS 7 TICKS REMOVED FROM RIGHT ARM IN 2001 OR 2002   • TENNIS ELBOW RELEASE Right 7/23/2020    Procedure: RIGHT TENNIS ELBOW RELEASE;  Surgeon: Jose Ruiz MD;  Location: [facility] OR;  Service: Orthopedics;  Laterality: Right;   • TUMOR EXCISION      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       FAMILY HISTORY:  Family History   Problem Relation Age of Onset   • Diabetes Mother    • Hypertension Mother    • Stroke Mother    • Diabetes Father    • Skin cancer Father    • Hypertension Father    • Heart attack Father    • Diabetes Brother    • Hypertension Brother    • Heart disease Maternal Aunt    • Heart disease Maternal Uncle    • Heart disease Paternal Aunt    • Heart disease Paternal Uncle    • Heart disease Maternal Grandmother    • Heart disease Maternal Grandfather    • Heart disease Paternal Grandmother    • Heart disease Paternal Grandfather        SOCIAL HISTORY:  Social History     Tobacco Use   • Smoking status: Current Every Day Smoker     Packs/day: 1.50     Years: 17.00     Pack years: 25.50     Types: Cigars, Cigarettes     Start date: 5/5/2010   • Smokeless tobacco: Never Used   • Tobacco comment: still uses 1.5 packs a day.  he has not smoked in approx 3 weeks.   Substance Use Topics   • Alcohol use: No       CURRENT MEDICATION:    Current Outpatient Medications:   •  albuterol (PROVENTIL) (2.5 MG/3ML) 0.083% nebulizer solution, Take 2.5 mg by nebulization Every 4 (Four) Hours As Needed for Shortness of Air., Disp: 180 vial, Rfl: 5  •  albuterol sulfate   (90 Base) MCG/ACT inhaler, Inhale 2 puffs Every 4 (Four) Hours As Needed for Wheezing., Disp: 18 g, Rfl: 5  •  aspirin (Aspirin Adult Low Strength) 81 MG EC tablet, Take 1 tablet by mouth Daily., Disp: 30 tablet, Rfl: 3  •  atorvastatin (LIPITOR) 40 MG tablet, Take 1 tablet by mouth Daily., Disp: 90 tablet, Rfl: 3  •  dexlansoprazole (Dexilant) 60 MG capsule, Take 1 capsule by mouth Daily., Disp: 30 capsule, Rfl: 5  •  dicyclomine (BENTYL) 10 MG capsule, Take 1 capsule by mouth Every 8 (Eight) Hours As Needed (abd pain)., Disp: 20 capsule, Rfl: 0  •  Dilantin 100 MG ER capsule, Take 2 capsules by mouth 2 (Two) Times a Day., Disp: 120 capsule, Rfl: 5  •  diphenhydrAMINE (BENADRYL ALLERGY) 25 MG tablet, Take 1 tablet by mouth Every 6 (Six) Hours As Needed for Itching or Allergies., Disp: 30 tablet, Rfl: 1  •  hyoscyamine (ANASPAZ,LEVSIN) 0.125 MG tablet, Take 1 tablet by mouth Every 6 (Six) Hours As Needed for Cramping., Disp: 12 tablet, Rfl: 0  •  lisinopril (PRINIVIL,ZESTRIL) 30 MG tablet, Take 1 tablet by mouth Daily., Disp: 30 tablet, Rfl: 5  •  loratadine (CLARITIN) 10 MG tablet, Take 1 tablet by mouth Daily As Needed for Allergies., Disp: 30 tablet, Rfl: 5  •  magnesium citrate solution, Take 296 mL by mouth 1 (One) Time for 1 dose., Disp: 296 mL, Rfl: 0  •  methocarbamol (ROBAXIN) 750 MG tablet, Take 1 tablet by mouth 2 (Two) Times a Day As Needed for Muscle Spasms., Disp: 60 tablet, Rfl: 5  •  mirtazapine (REMERON) 30 MG tablet, Take 1.5 tablets by mouth Every Night. 1 tablet 2-3 hours before bedtime, Disp: 45 tablet, Rfl: 5  •  Misc. Devices (BATH/SHOWER SEAT) misc, 1 each Daily., Disp: 1 each, Rfl: 0  •  montelukast (SINGULAIR) 10 MG tablet, Take 1 tablet by mouth Every Night., Disp: 30 tablet, Rfl: 5  •  mupirocin (BACTROBAN) 2 % ointment, Apply  topically to the appropriate area as directed 3 (Three) Times a Day., Disp: 30 g, Rfl: 0  •  nitrofurantoin, macrocrystal-monohydrate, (Macrobid) 100 MG capsule,  "Take 1 capsule by mouth 2 (Two) Times a Day., Disp: 30 capsule, Rfl: 0  •  ondansetron (Zofran) 4 MG tablet, Take 1 tablet by mouth Every 8 (Eight) Hours As Needed for Nausea., Disp: 30 tablet, Rfl: 1  •  polyethylene glycol (GoLYTELY) 236 g solution, Starting at 6pm the day before procedure, drink 8 ounces every 30 minutes until all gone or stools are clear. May add flavor packet., Disp: 4000 mL, Rfl: 0  •  potassium chloride 10 MEQ CR tablet, Take 1 tablet by mouth Daily., Disp: 30 tablet, Rfl: 5  •  promethazine (PHENERGAN) 25 MG tablet, Take 1 tablet by mouth Every 6 (Six) Hours As Needed for Nausea or Vomiting., Disp: 30 tablet, Rfl: 1  •  vitamin D (ERGOCALCIFEROL) 1.25 MG (01655 UT) capsule capsule, Take 1 capsule by mouth Every 7 (Seven) Days. On Friday, Disp: 5 capsule, Rfl: 5  •  Plecanatide 3 MG tablet, Take 1 tablet by mouth Daily., Disp: 30 tablet, Rfl: 0    ALLERGIES:  Ciprofloxacin, Mobic [meloxicam], Paxil [paroxetine hcl], Peanut-containing drug products, Penicillins, Pristiq [desvenlafaxine succinate er], Sulfa antibiotics, Doxycycline, Fish-derived products, Isosorbide nitrate, Movantik [naloxegol], Buspar [buspirone], Clarithromycin, Clindamycin/lincomycin, Codeine, Contrast dye, Diltiazem, Gabapentin, Keflex [cephalexin], Linzess [linaclotide], Metoprolol, Prednisone, Robitussin cough+ chest max st [dextromethorphan-guaifenesin], Shrimp (diagnostic), Spironolactone, Viibryd [vilazodone hcl], and Zoloft [sertraline hcl]    VISIT VITALS:  /79 (BP Location: Left arm, Patient Position: Sitting, Cuff Size: Adult)   Pulse 70   Ht 170.2 cm (67\")   Wt 107 kg (236 lb)   BMI 36.96 kg/m²   Physical Exam  Constitutional:       General: He is not in acute distress.     Appearance: Normal appearance. He is well-developed.   HENT:      Head: Normocephalic and atraumatic.   Eyes:      Pupils: Pupils are equal, round, and reactive to light.   Cardiovascular:      Rate and Rhythm: Normal rate and " regular rhythm.      Heart sounds: Normal heart sounds.   Pulmonary:      Effort: Pulmonary effort is normal. No respiratory distress.      Breath sounds: Normal breath sounds. No wheezing, rhonchi or rales.   Abdominal:      General: Abdomen is flat. Bowel sounds are normal. There is no distension.      Palpations: Abdomen is soft. There is no mass.      Tenderness: There is no abdominal tenderness. There is no guarding or rebound.      Hernia: No hernia is present.   Musculoskeletal:         General: No swelling. Normal range of motion.      Cervical back: Normal range of motion and neck supple.      Right lower leg: No edema.      Left lower leg: No edema.   Skin:     General: Skin is warm and dry.   Neurological:      Mental Status: He is alert and oriented to person, place, and time.   Psychiatric:         Attention and Perception: Attention normal.         Mood and Affect: Mood normal.         Speech: Speech normal.         Behavior: Behavior normal. Behavior is cooperative.         Thought Content: Thought content normal.           Assessment/Plan   We will give the patient samples of Trulance to see if he gets any relief from his constipation.  He has tried most chronic constipation medications at this point.  He was given Mylanta and mag citrate by his PCP-he was instructed to do cleanout prior to starting the Trulance.  He voiced understanding of the treatment plan and agreed.  If Trulance does not work we may try a motility drug next.   Diagnosis Plan   1. Chronic idiopathic constipation  Plecanatide 3 MG tablet   2. Gastroesophageal reflux disease without esophagitis         Return in about 2 weeks (around 6/28/2021).                   This document has been electronically signed by Marisela Nix PA-C  June 14, 2021 15:26 EDT    Part of this note may be an electronic transcription/translation of spoken language to printed text using the Dragon Dictation System.

## 2021-06-16 ENCOUNTER — HOSPITAL ENCOUNTER (OUTPATIENT)
Dept: CARDIOLOGY | Facility: HOSPITAL | Age: 51
Discharge: HOME | End: 2021-06-16
Admitting: NURSE PRACTITIONER

## 2021-06-16 ENCOUNTER — TELEPHONE (OUTPATIENT)
Dept: FAMILY MEDICINE CLINIC | Facility: CLINIC | Age: 51
End: 2021-06-16

## 2021-06-16 DIAGNOSIS — N20.0 RENAL STONE: Primary | ICD-10-CM

## 2021-06-16 PROCEDURE — 93923 UPR/LXTR ART STDY 3+ LVLS: CPT | Performed by: RADIOLOGY

## 2021-06-16 PROCEDURE — 93923 UPR/LXTR ART STDY 3+ LVLS: CPT

## 2021-06-16 NOTE — TELEPHONE ENCOUNTER
----- Message from BALDOMERO Thao sent at 6/16/2021  6:36 AM EDT -----  I have placed him a referral  ----- Message -----  From: Gissell Isidro MA  Sent: 6/15/2021   4:33 PM EDT  To: BALDOMERO Thao    Patient calling wants to let you know that he passed 2 small kidney stones.     GI thinks that he will benefit from seeing Dr. MISTRY in the same office. They need a referral.

## 2021-06-18 RX ORDER — PHENAZOPYRIDINE HYDROCHLORIDE 200 MG/1
200 TABLET, FILM COATED ORAL 3 TIMES DAILY PRN
Qty: 30 TABLET | Refills: 0 | Status: SHIPPED | OUTPATIENT
Start: 2021-06-18 | End: 2021-07-21 | Stop reason: SDUPTHER

## 2021-06-24 ENCOUNTER — TELEPHONE (OUTPATIENT)
Dept: FAMILY MEDICINE CLINIC | Facility: CLINIC | Age: 51
End: 2021-06-24

## 2021-06-24 NOTE — TELEPHONE ENCOUNTER
----- Message from BALDOMERO Thao sent at 6/21/2021  2:44 PM EDT -----  He can have the pharmacy transfer the prescription to somewhere closer if needed.  If he is still not well he can go back to the hospital for evaluation.   ----- Message -----  From: Gissell Isidro MA  Sent: 6/21/2021  11:21 AM EDT  To: BALDOMERO Thao    Patient states that he is no longer taking macrobid, it made him sick to his stomach.   He states that he passed another small stone. Still hurting.   Patient notified that rx was sent to pharmacy. He states that he cannot go get the medication because he does not have the gas to go to the pharmacy.   ----- Message -----  From: Tiera Valenzuela APRN  Sent: 6/18/2021   8:15 AM EDT  To: Gissell Isidro MA    I am not sure that I know of anything else to recommend to him.  His insurance did not cover dicyclomine and if the hycosamine contains sulfate I did not know of any other medications to use for irritation in the bowel.    However since he feels he has recently passed a stone I will send him some medication for bladder spasm and we will see if this may help his symptoms  ----- Message -----  From: Gissell Isidro MA  Sent: 6/16/2021   1:11 PM EDT  To: BALDOMERO Thao    He wants something else sent in  ----- Message -----  From: Tiera Valenzuela APRN  Sent: 6/16/2021   1:08 PM EDT  To: Gissell Isidro MA    Then stop the medication.    ----- Message -----  From: Gissell Isidro MA  Sent: 6/16/2021  12:32 PM EDT  To: BALDOMERO Thao    Patient calling, states that he cannot take the cramping medication because it has sulfa and he is allergic.   Please advise.

## 2021-06-28 ENCOUNTER — OFFICE VISIT (OUTPATIENT)
Dept: GASTROENTEROLOGY | Facility: CLINIC | Age: 51
End: 2021-06-28

## 2021-06-28 VITALS — HEIGHT: 67 IN | BODY MASS INDEX: 37.04 KG/M2 | WEIGHT: 236 LBS

## 2021-06-28 DIAGNOSIS — K21.9 GASTROESOPHAGEAL REFLUX DISEASE WITHOUT ESOPHAGITIS: ICD-10-CM

## 2021-06-28 DIAGNOSIS — R19.7 DIARRHEA, UNSPECIFIED TYPE: ICD-10-CM

## 2021-06-28 DIAGNOSIS — R14.0 BLOATING: ICD-10-CM

## 2021-06-28 DIAGNOSIS — Z86.010 HISTORY OF COLON POLYPS: ICD-10-CM

## 2021-06-28 DIAGNOSIS — R10.84 GENERALIZED ABDOMINAL PAIN: ICD-10-CM

## 2021-06-28 DIAGNOSIS — J30.1 SEASONAL ALLERGIC RHINITIS DUE TO POLLEN: ICD-10-CM

## 2021-06-28 DIAGNOSIS — K59.04 CHRONIC IDIOPATHIC CONSTIPATION: ICD-10-CM

## 2021-06-28 DIAGNOSIS — Z86.010 HISTORY OF COLON POLYPS: Primary | ICD-10-CM

## 2021-06-28 DIAGNOSIS — Z01.818 PREOPERATIVE CLEARANCE: ICD-10-CM

## 2021-06-28 DIAGNOSIS — R19.7 DIARRHEA, UNSPECIFIED TYPE: Primary | ICD-10-CM

## 2021-06-28 PROCEDURE — 99214 OFFICE O/P EST MOD 30 MIN: CPT | Performed by: PHYSICIAN ASSISTANT

## 2021-06-28 RX ORDER — DIPHENHYDRAMINE HCL 25 MG
25 TABLET ORAL EVERY 6 HOURS PRN
Qty: 30 TABLET | Refills: 1 | Status: SHIPPED | OUTPATIENT
Start: 2021-06-28 | End: 2022-02-03

## 2021-06-28 RX ORDER — BISACODYL 5 MG/1
20 TABLET, DELAYED RELEASE ORAL ONCE
Qty: 4 TABLET | Refills: 0 | Status: SHIPPED | OUTPATIENT
Start: 2021-06-28 | End: 2021-06-28

## 2021-06-28 RX ORDER — MAGNESIUM CARB/ALUMINUM HYDROX 105-160MG
296 TABLET,CHEWABLE ORAL TAKE AS DIRECTED
Qty: 592 ML | Refills: 0 | Status: SHIPPED | OUTPATIENT
Start: 2021-06-28 | End: 2021-08-20 | Stop reason: SDUPTHER

## 2021-06-28 NOTE — PROGRESS NOTES
Chief Complaint   Patient presents with   • Constipation       Jaquan Mckay is a 50 y.o. male who presents to the office today for evaluation of Constipation  .    HPI  Patient presents to the clinic today for chronic constipation.  He states this issue has been going on for years but over the last 2 months he has been having abdominal cramping.  He states since his last visit he has passed 4 kidney stones and is still having some lower back pain.  He was last prescribed Trulance and broke out in a full body rash after taking the medication roughly 3 days.  He is still unable to have a bowel movement.  He went roughly 4 to 5 days without a bowel movement and is now having bouts of diarrhea.  He is having early satiety and difficulty eating due to him becoming full fast.  Anytime he does eat he states his abdomen swells and becomes hard to touch.  He is having some bouts of nausea but at this time has not vomited.  He stated at this visit he did have a spine surgery that could have led to some scar tissue in and around the colon.  He denies any bright red blood per rectum and melena.  His GERD is well controlled still on his current medication    Review of Systems   Constitutional: Negative for chills, fatigue and fever.   HENT: Negative for trouble swallowing.    Eyes: Negative.    Respiratory: Negative for cough, choking, chest tightness and shortness of breath.    Cardiovascular: Negative for chest pain.   Gastrointestinal: Positive for abdominal distention, abdominal pain, anal bleeding, blood in stool, constipation, diarrhea and nausea. Negative for rectal pain and vomiting.   Endocrine: Negative.    Genitourinary: Positive for hematuria. Negative for difficulty urinating.   Musculoskeletal: Positive for back pain. Negative for neck pain.   Skin: Negative for color change, pallor, rash and wound.   Allergic/Immunologic: Negative for environmental allergies and food allergies.   Neurological: Positive for  "headaches. Negative for dizziness and light-headedness.   Hematological: Does not bruise/bleed easily.   Psychiatric/Behavioral: Negative.        ACTIVE PROBLEMS:   Specialty Problems        Gastroenterology Problems    Gastroesophageal reflux disease without esophagitis        Rectal bleeding        Therapeutic opioid-induced constipation (OIC)              PAST MEDICAL HISTORY:  Past Medical History:   Diagnosis Date   • Allergic    • Anxiety    • Arthritis    • Asthma    • Body piercing     REPORTS CYLICONE IN EARS   • Clotting disorder (CMS/HCC) 2004    had a knee surgery   • Coronary artery disease    • Depression    • DVT (deep venous thrombosis) (CMS/HCC)     RIGHT RIGHT KNEE AFTER SURGERY YEARS AGO IN 2001 OR 2004   • Elevated cholesterol    • Gastric ulcer    • GERD (gastroesophageal reflux disease)    • H/O migraine    • Headache    • Heart attack (CMS/HCC)     REPORTS \"LIGHT HEART ATTACK A LONG TIME AGO\"  \"EARLY 90'S\"   • History of seizures     REPORTS LAST EPISODE WAS AROUND 1995.   • Hostility    • Hyperlipidemia    • Hypertension    • Knee pain, acute     Left   • Low back pain    • Lyme disease    • Migraine    • MRSA (methicillin resistant Staphylococcus aureus)     REPORTS LAST TESTED + 2004. WAS TREATED HE REPORTS.  RIGHT ARM, RIGHT KNEE.   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    • Obesity    • Poor historian    • [place] spotted fever    • Seizures (CMS/HCC)    • Sleep apnea    • Tattoo    • Wears glasses        SURGICAL HISTORY:  Past Surgical History:   Procedure Laterality Date   • ABDOMINAL SURGERY     • BACK SURGERY     • BRAIN SURGERY  1986    Tumor removal    • CARDIAC CATHETERIZATION N/A 9/28/2018    Procedure: Left Heart Cath;  Surgeon: Leandro Daily MD;  Location: [facility] INVASIVE LOCATION;  Service: Cardiology   • CHOLECYSTECTOMY     • COLONOSCOPY     • CYST REMOVAL      pilonidal cyst   • ELBOW EPICONDYLECTOMY Right 7/23/2020    Procedure: LATERAL EPICONDYLAR RELEASE;  Surgeon: Sara" Jose Edwards MD;  Location: [facility];  Service: Orthopedics;  Laterality: Right;   • ENDOSCOPY     • FRACTURE SURGERY Right     elbow   • KNEE ARTHROSCOPY Left 10/20/2017    Procedure: Diagnostic arthroscopy left knee with chondroplasty;  Surgeon: Marco Aguirre MD;  Location: [facility];  Service:    • KNEE ARTHROSCOPY Left 1/11/2021    Procedure: KNEE DIAGNOSTIC ARTHROSCOPY WITH  CHONDROPLASTY patella, femoral and medial;  Surgeon: Raul Eagle MD;  Location: [facility] OR;  Service: Orthopedics;  Laterality: Left;   • KNEE SURGERY Right    • MOUTH SURGERY      FULL MOUTH EXTRACTION   • OTHER SURGICAL HISTORY      REPORTS 7 TICKS REMOVED FROM RIGHT ARM IN 2001 OR 2002   • TENNIS ELBOW RELEASE Right 7/23/2020    Procedure: RIGHT TENNIS ELBOW RELEASE;  Surgeon: Jose Ruiz MD;  Location: [facility];  Service: Orthopedics;  Laterality: Right;   • TUMOR EXCISION      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       FAMILY HISTORY:  Family History   Problem Relation Age of Onset   • Diabetes Mother    • Hypertension Mother    • Stroke Mother    • Diabetes Father    • Skin cancer Father    • Hypertension Father    • Heart attack Father    • Diabetes Brother    • Hypertension Brother    • Heart disease Maternal Aunt    • Heart disease Maternal Uncle    • Heart disease Paternal Aunt    • Heart disease Paternal Uncle    • Heart disease Maternal Grandmother    • Heart disease Maternal Grandfather    • Heart disease Paternal Grandmother    • Heart disease Paternal Grandfather        SOCIAL HISTORY:  Social History     Tobacco Use   • Smoking status: Current Every Day Smoker     Packs/day: 1.50     Years: 17.00     Pack years: 25.50     Types: Cigars, Cigarettes     Start date: 5/5/2010   • Smokeless tobacco: Never Used   • Tobacco comment: still uses 1.5 packs a day.  he has not smoked in approx 3 weeks.   Substance Use Topics   • Alcohol use: No       CURRENT MEDICATION:    Current Outpatient Medications:   •   albuterol (PROVENTIL) (2.5 MG/3ML) 0.083% nebulizer solution, Take 2.5 mg by nebulization Every 4 (Four) Hours As Needed for Shortness of Air., Disp: 180 vial, Rfl: 5  •  albuterol sulfate  (90 Base) MCG/ACT inhaler, Inhale 2 puffs Every 4 (Four) Hours As Needed for Wheezing., Disp: 18 g, Rfl: 5  •  aspirin (Aspirin Adult Low Strength) 81 MG EC tablet, Take 1 tablet by mouth Daily., Disp: 30 tablet, Rfl: 3  •  atorvastatin (LIPITOR) 40 MG tablet, Take 1 tablet by mouth Daily., Disp: 90 tablet, Rfl: 3  •  dexlansoprazole (Dexilant) 60 MG capsule, Take 1 capsule by mouth Daily., Disp: 30 capsule, Rfl: 5  •  dicyclomine (BENTYL) 10 MG capsule, Take 1 capsule by mouth Every 8 (Eight) Hours As Needed (abd pain)., Disp: 20 capsule, Rfl: 0  •  Dilantin 100 MG ER capsule, Take 2 capsules by mouth 2 (Two) Times a Day., Disp: 120 capsule, Rfl: 5  •  diphenhydrAMINE (Benadryl Allergy) 25 MG tablet, Take 1 tablet by mouth Every 6 (Six) Hours As Needed for Itching or Allergies., Disp: 30 tablet, Rfl: 1  •  hyoscyamine (ANASPAZ,LEVSIN) 0.125 MG tablet, Take 1 tablet by mouth Every 6 (Six) Hours As Needed for Cramping., Disp: 12 tablet, Rfl: 0  •  lisinopril (PRINIVIL,ZESTRIL) 30 MG tablet, Take 1 tablet by mouth Daily., Disp: 30 tablet, Rfl: 5  •  loratadine (CLARITIN) 10 MG tablet, Take 1 tablet by mouth Daily As Needed for Allergies., Disp: 30 tablet, Rfl: 5  •  methocarbamol (ROBAXIN) 750 MG tablet, Take 1 tablet by mouth 2 (Two) Times a Day As Needed for Muscle Spasms., Disp: 60 tablet, Rfl: 5  •  mirtazapine (REMERON) 30 MG tablet, Take 1.5 tablets by mouth Every Night. 1 tablet 2-3 hours before bedtime, Disp: 45 tablet, Rfl: 5  •  Misc. Devices (BATH/SHOWER SEAT) misc, 1 each Daily., Disp: 1 each, Rfl: 0  •  montelukast (SINGULAIR) 10 MG tablet, Take 1 tablet by mouth Every Night., Disp: 30 tablet, Rfl: 5  •  mupirocin (BACTROBAN) 2 % ointment, Apply  topically to the appropriate area as directed 3 (Three) Times a  Day., Disp: 30 g, Rfl: 0  •  nitrofurantoin, macrocrystal-monohydrate, (Macrobid) 100 MG capsule, Take 1 capsule by mouth 2 (Two) Times a Day., Disp: 30 capsule, Rfl: 0  •  ondansetron (Zofran) 4 MG tablet, Take 1 tablet by mouth Every 8 (Eight) Hours As Needed for Nausea., Disp: 30 tablet, Rfl: 1  •  phenazopyridine (Pyridium) 200 MG tablet, Take 1 tablet by mouth 3 (Three) Times a Day As Needed for Bladder Spasms., Disp: 30 tablet, Rfl: 0  •  Plecanatide 3 MG tablet, Take 1 tablet by mouth Daily., Disp: 30 tablet, Rfl: 0  •  polyethylene glycol (GoLYTELY) 236 g solution, Starting at 6pm the day before procedure, drink 8 ounces every 30 minutes until all gone or stools are clear. May add flavor packet., Disp: 4000 mL, Rfl: 0  •  potassium chloride 10 MEQ CR tablet, Take 1 tablet by mouth Daily., Disp: 30 tablet, Rfl: 5  •  promethazine (PHENERGAN) 25 MG tablet, Take 1 tablet by mouth Every 6 (Six) Hours As Needed for Nausea or Vomiting., Disp: 30 tablet, Rfl: 1  •  vitamin D (ERGOCALCIFEROL) 1.25 MG (48412 UT) capsule capsule, Take 1 capsule by mouth Every 7 (Seven) Days. On Friday, Disp: 5 capsule, Rfl: 5  •  bisacodyl (DULCOLAX) 5 MG EC tablet, Take 4 tablets by mouth 1 (One) Time for 1 dose. Take 4 tablets at 4:00 PM the day before procedure, Disp: 4 tablet, Rfl: 0  •  magnesium citrate 1.745 GM/30ML solution solution, Take 296 mL by mouth Take As Directed for 2 doses. Take one full bottle at 4:00 PM and take second bottle at 10:00 PM., Disp: 592 mL, Rfl: 0    ALLERGIES:  Ciprofloxacin, Mobic [meloxicam], Paxil [paroxetine hcl], Peanut-containing drug products, Penicillins, Pristiq [desvenlafaxine succinate er], Sulfa antibiotics, Doxycycline, Fish-derived products, Isosorbide nitrate, Movantik [naloxegol], Buspar [buspirone], Clarithromycin, Clindamycin/lincomycin, Codeine, Contrast dye, Diltiazem, Gabapentin, Keflex [cephalexin], Linzess [linaclotide], Metoprolol, Prednisone, Robitussin cough+ chest max st  "[dextromethorphan-guaifenesin], Shrimp (diagnostic), Spironolactone, Viibryd [vilazodone hcl], and Zoloft [sertraline hcl]    VISIT VITALS:  Ht 170.2 cm (67\")   Wt 107 kg (236 lb)   BMI 36.96 kg/m²   Physical Exam  Constitutional:       General: He is not in acute distress.     Appearance: Normal appearance. He is well-developed.   HENT:      Head: Normocephalic and atraumatic.   Eyes:      Pupils: Pupils are equal, round, and reactive to light.   Cardiovascular:      Rate and Rhythm: Normal rate and regular rhythm.      Heart sounds: Normal heart sounds.   Pulmonary:      Effort: Pulmonary effort is normal. No respiratory distress.      Breath sounds: Normal breath sounds. No wheezing, rhonchi or rales.   Abdominal:      General: Abdomen is flat. Bowel sounds are normal. There is no distension.      Palpations: Abdomen is soft. There is no mass.      Tenderness: There is no abdominal tenderness. There is no guarding or rebound.      Hernia: No hernia is present.   Musculoskeletal:         General: No swelling. Normal range of motion.      Cervical back: Normal range of motion and neck supple.      Right lower leg: No edema.      Left lower leg: No edema.   Skin:     General: Skin is warm and dry.   Neurological:      Mental Status: He is alert and oriented to person, place, and time.   Psychiatric:         Attention and Perception: Attention normal.         Mood and Affect: Mood normal.         Speech: Speech normal.         Behavior: Behavior normal. Behavior is cooperative.         Thought Content: Thought content normal.           Assessment/Plan   Patient states that he was unable to take the Trulance due to developing a itchy rash all over his body.  We will go ahead at this point and do a EGD colonoscopy to try to find an etiology for the abdominal pain and constipation.  The procedure was thoroughly explained to the patient he voiced understanding and agreement to the treatment plan.  We will use a mag " citrate Dulcolax colon prep.  We will also start him on samples of Motegrity to see if this will help him have a bowel movement-he is supposed to call in 1 week to let us know if medication is helping.   Diagnosis Plan   1. History of colon polyps  Follow Anesthesia Guidelines / Protocol    Obtain Informed Consent    Give Tap Water Enema If Bowel Prep Insufficient    Case Request    Case Request    magnesium citrate 1.745 GM/30ML solution solution    bisacodyl (DULCOLAX) 5 MG EC tablet   2. Chronic idiopathic constipation  Follow Anesthesia Guidelines / Protocol    Obtain Informed Consent    Give Tap Water Enema If Bowel Prep Insufficient    Case Request    Case Request    magnesium citrate 1.745 GM/30ML solution solution    bisacodyl (DULCOLAX) 5 MG EC tablet   3. Gastroesophageal reflux disease without esophagitis  Follow Anesthesia Guidelines / Protocol    Obtain Informed Consent    Give Tap Water Enema If Bowel Prep Insufficient    Case Request    Case Request   4. Bloating  Follow Anesthesia Guidelines / Protocol    Obtain Informed Consent    Give Tap Water Enema If Bowel Prep Insufficient    Case Request    Case Request   5. Diarrhea, unspecified type  Follow Anesthesia Guidelines / Protocol    Obtain Informed Consent    Give Tap Water Enema If Bowel Prep Insufficient    Case Request    Case Request    magnesium citrate 1.745 GM/30ML solution solution    bisacodyl (DULCOLAX) 5 MG EC tablet   6. Generalized abdominal pain  Follow Anesthesia Guidelines / Protocol    Obtain Informed Consent    Give Tap Water Enema If Bowel Prep Insufficient    Case Request    Case Request    magnesium citrate 1.745 GM/30ML solution solution    bisacodyl (DULCOLAX) 5 MG EC tablet   7. Seasonal allergic rhinitis due to pollen  diphenhydrAMINE (Benadryl Allergy) 25 MG tablet       Return in about 4 weeks (around 7/26/2021).                   This document has been electronically signed by Marisela Nix PA-C  June 28, 2021 15:52  EDT    Part of this note may be an electronic transcription/translation of spoken language to printed text using the Dragon Dictation System.

## 2021-06-30 ENCOUNTER — OFFICE VISIT (OUTPATIENT)
Dept: FAMILY MEDICINE CLINIC | Facility: CLINIC | Age: 51
End: 2021-06-30

## 2021-06-30 VITALS
HEIGHT: 67 IN | WEIGHT: 234 LBS | DIASTOLIC BLOOD PRESSURE: 84 MMHG | OXYGEN SATURATION: 98 % | HEART RATE: 80 BPM | TEMPERATURE: 97.5 F | BODY MASS INDEX: 36.73 KG/M2 | SYSTOLIC BLOOD PRESSURE: 124 MMHG

## 2021-06-30 DIAGNOSIS — R10.31 RIGHT LOWER QUADRANT ABDOMINAL PAIN: Primary | ICD-10-CM

## 2021-06-30 DIAGNOSIS — R34 DECREASED URINE OUTPUT: ICD-10-CM

## 2021-06-30 DIAGNOSIS — R10.2 SUPRAPUBIC PAIN: ICD-10-CM

## 2021-06-30 PROCEDURE — 99213 OFFICE O/P EST LOW 20 MIN: CPT | Performed by: NURSE PRACTITIONER

## 2021-06-30 RX ORDER — HYDROCODONE BITARTRATE AND ACETAMINOPHEN 5; 325 MG/1; MG/1
1 TABLET ORAL EVERY 6 HOURS PRN
Qty: 12 TABLET | Refills: 0 | Status: SHIPPED | OUTPATIENT
Start: 2021-06-30 | End: 2021-08-03

## 2021-06-30 NOTE — PROGRESS NOTES
"Subjective   Jaquan Mckay is a 50 y.o. male.     Chief Complaint   Patient presents with   • Blood in Urine       History of Present Illness     Blood in urine-happened this morning about 10 AM.  Reports he has not urinated since then.   Urine was orange to red in color but reports he started pyridium last night.  He reports pressure and abdominal pain.  He is having some increased chest pain and pressure as well.  He keeps stating \"the area over my appendix is killing me\".  He is also holding over his bladder and reporting increasing pressure.  He denies any back pain.  Tick bite on nose-report he got the tick out.  Occurred on his left side of nose.  He is not noting any edema or drainage.  No erythema is present at the area    The following portions of the patient's history were reviewed and updated as appropriate: CC, ROS, allergies, current medications, past family history, past medical history, past social history, past surgical history and problem list.      Review of Systems   Constitutional: Negative for activity change, appetite change, fatigue and fever.   HENT: Negative for congestion, ear pain, facial swelling, nosebleeds, rhinorrhea, sinus pressure, sore throat and trouble swallowing.    Eyes: Negative for blurred vision, double vision and redness.   Respiratory: Negative for cough, chest tightness, shortness of breath and wheezing.    Cardiovascular: Negative for chest pain, palpitations and leg swelling.   Gastrointestinal: Positive for abdominal pain. Negative for blood in stool, constipation and diarrhea.   Endocrine: Negative.    Genitourinary: Positive for hematuria. Negative for dysuria, flank pain, frequency and urgency.   Musculoskeletal: Negative.    Skin: Negative.    Allergic/Immunologic: Negative.    Neurological: Negative for dizziness, weakness, light-headedness and headache.   Hematological: Negative.    Psychiatric/Behavioral: Negative for self-injury, sleep disturbance and suicidal " "ideas. The patient is not nervous/anxious.    All other systems reviewed and are negative.      Objective     /84   Pulse 80   Temp 97.5 °F (36.4 °C)   Ht 170.2 cm (67.01\")   Wt 106 kg (234 lb)   SpO2 98%   BMI 36.64 kg/m²     Physical Exam  Vitals reviewed.   Constitutional:       General: He is in acute distress.      Appearance: Normal appearance. He is well-developed.      Comments: Leaning over exam table groaning.  Grimacing with movement such as sit to stand position changes.  Holding to his right lower side and suprapubic area     HENT:      Head: Normocephalic and atraumatic.      Comments: Oropharynx not examined.  Patient is presently wearing a face covering/mask due to COVID-19 pandemic.     Right Ear: Ear canal and external ear normal.      Left Ear: Tympanic membrane, ear canal and external ear normal.   Eyes:      General: No scleral icterus.     Pupils: Pupils are equal, round, and reactive to light.   Neck:      Thyroid: No thyromegaly.      Vascular: No JVD.   Cardiovascular:      Rate and Rhythm: Normal rate and regular rhythm.      Heart sounds: Normal heart sounds.   Pulmonary:      Effort: Pulmonary effort is normal.      Breath sounds: Normal breath sounds.   Abdominal:      General: Bowel sounds are normal. There is distension (mildly).      Palpations: Abdomen is soft.      Tenderness: There is abdominal tenderness in the right lower quadrant and suprapubic area. There is guarding.      Hernia: A hernia is present.   Musculoskeletal:      Cervical back: Normal range of motion and neck supple.   Skin:     General: Skin is warm and dry.      Capillary Refill: Capillary refill takes less than 2 seconds.   Neurological:      Mental Status: He is alert and oriented to person, place, and time.      Cranial Nerves: No cranial nerve deficit.      Coordination: Coordination normal.      Gait: Gait abnormal.   Psychiatric:         Behavior: Behavior normal.         Thought Content: Thought " content normal.         Judgment: Judgment normal.       Assessment/Plan     Diagnoses and all orders for this visit:    1. Right lower quadrant abdominal pain (Primary)  -     HYDROcodone-acetaminophen (Norco) 5-325 MG per tablet; Take 1 tablet by mouth Every 6 (Six) Hours As Needed for Moderate Pain .  Dispense: 12 tablet; Refill: 0    2. Suprapubic pain  Comments:  Suspect urine discoloration related to Pyridium use.  Patient unable to void in the office to provide sample  Orders:  -     HYDROcodone-acetaminophen (Norco) 5-325 MG per tablet; Take 1 tablet by mouth Every 6 (Six) Hours As Needed for Moderate Pain .  Dispense: 12 tablet; Refill: 0    3. Decreased urine output  -     HYDROcodone-acetaminophen (Norco) 5-325 MG per tablet; Take 1 tablet by mouth Every 6 (Six) Hours As Needed for Moderate Pain .  Dispense: 12 tablet; Refill: 0       Understands disease processes and need for medications.  Understands reasons for urgent and emergent care.  Patient (& family) verbalized agreement for treatment plan.   Emotional support and active listening provided.  Patient provided time to verbalize feelings.    Advised patient since he has not had urine output in 6 hours to report to the emergency department for further evaluation.  Updated medication list given to patient.    CHAVEZ reviewed today and consistent.  Will refill prescribed controlled medication today.  Patient is aware they cannot receive narcotics from any other provider except if under care of pain management or speciality clinic.  Risk and benefits of medication use has been reviewed.  History and physical exam exhibit continued safe and appropriate use of controlled substances.  The patient is aware of the potential for addiction and dependence.  This patient has been made aware of the appropriate use of such medications, including potential risk of somnolence, limited ability to drive and / or work safely, and potential for overdose.  Patient  understands not to take medication and drive until they know how medication may affect their cognition/decision making.   It has also been made clear that these medications are for use by this patient only, without concomitant use of alcohol or other substances unless prescribed/advised by medical provider.  Patient understands they may be subject to UDS and pill counts at random.      Patient supplied short course of Norco due to his abdominal pain being persistent.    Continue under the care of urology and gastroenterology.    RTC 3 to 5 days, sooner if needed for problems or concerns          This document has been electronically signed by:  BALDOMERO Thao, FNP-C    Dragon disclaimer:  Much of this encounter note is an electronic transcription/translation of spoken language to printed text. The electronic translation of spoken language may permit erroneous, or at times, nonsensical words or phrases to be inadvertently transcribed; Although I have reviewed the note for such errors, some may still exist.

## 2021-07-01 ENCOUNTER — HOSPITAL ENCOUNTER (OUTPATIENT)
Dept: GENERAL RADIOLOGY | Facility: HOSPITAL | Age: 51
Discharge: HOME | End: 2021-07-01
Admitting: UROLOGY

## 2021-07-01 ENCOUNTER — TELEPHONE (OUTPATIENT)
Dept: FAMILY MEDICINE CLINIC | Facility: CLINIC | Age: 51
End: 2021-07-01

## 2021-07-01 ENCOUNTER — OFFICE VISIT (OUTPATIENT)
Dept: UROLOGY | Facility: CLINIC | Age: 51
End: 2021-07-01

## 2021-07-01 VITALS — WEIGHT: 242 LBS | HEIGHT: 67 IN | BODY MASS INDEX: 37.98 KG/M2

## 2021-07-01 DIAGNOSIS — R33.9 INCOMPLETE BLADDER EMPTYING: ICD-10-CM

## 2021-07-01 DIAGNOSIS — N20.0 RENAL CALCULUS: ICD-10-CM

## 2021-07-01 DIAGNOSIS — K59.01 SLOW TRANSIT CONSTIPATION: Primary | ICD-10-CM

## 2021-07-01 DIAGNOSIS — R33.9 INCOMPLETE BLADDER EMPTYING: Primary | ICD-10-CM

## 2021-07-01 PROCEDURE — 84153 ASSAY OF PSA TOTAL: CPT | Performed by: UROLOGY

## 2021-07-01 PROCEDURE — 99203 OFFICE O/P NEW LOW 30 MIN: CPT | Performed by: UROLOGY

## 2021-07-01 PROCEDURE — 74018 RADEX ABDOMEN 1 VIEW: CPT | Performed by: RADIOLOGY

## 2021-07-01 PROCEDURE — 85027 COMPLETE CBC AUTOMATED: CPT | Performed by: UROLOGY

## 2021-07-01 PROCEDURE — 74018 RADEX ABDOMEN 1 VIEW: CPT

## 2021-07-01 PROCEDURE — 51798 US URINE CAPACITY MEASURE: CPT | Performed by: UROLOGY

## 2021-07-01 RX ORDER — BISACODYL 5 MG
15 TABLET, DELAYED RELEASE (ENTERIC COATED) ORAL ONCE
Qty: 3 TABLET | Refills: 0 | Status: SHIPPED | OUTPATIENT
Start: 2021-07-01 | End: 2021-07-01

## 2021-07-01 RX ORDER — DICYCLOMINE HCL 20 MG
TABLET ORAL
COMMUNITY
Start: 2021-06-11 | End: 2021-07-14

## 2021-07-01 NOTE — PROGRESS NOTES
"Chief Complaint:          Chief Complaint   Patient presents with   • Urinary Retention       HPI:   50 y.o. male seen in 2016.  He went to the ER with urinary retention.  They told him he a urinary tract infection with a stone in the appendix.  He did not send the CT.  His catheter is out is post void 13 cc.  He passed 4 stones.  But he is not sure of any of the information associated with it.  I will get the results and follow-up with him based on this.  He is somewhat of a poor historian and his KUB is negative we will notify him of the results when they are available.      Past Medical History:        Past Medical History:   Diagnosis Date   • Allergic    • Anxiety    • Arthritis    • Asthma    • Body piercing     REPORTS CYLICONE IN EARS   • Clotting disorder (CMS/[facility]) 2004    had a knee surgery   • Coronary artery disease    • Depression    • DVT (deep venous thrombosis) (CMS/[facility])     RIGHT RIGHT KNEE AFTER SURGERY YEARS AGO IN 2001 OR 2004   • Elevated cholesterol    • Gastric ulcer    • GERD (gastroesophageal reflux disease)    • H/O migraine    • Headache    • Heart attack (CMS/[facility])     REPORTS \"LIGHT HEART ATTACK A LONG TIME AGO\"  \"EARLY 90'S\"   • History of seizures     REPORTS LAST EPISODE WAS AROUND 1995.   • Hostility    • Hyperlipidemia    • Hypertension    • Knee pain, acute     Left   • Low back pain    • Lyme disease    • Migraine    • MRSA (methicillin resistant Staphylococcus aureus)     REPORTS LAST TESTED + 2004. WAS TREATED HE REPORTS.  RIGHT ARM, RIGHT KNEE.   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 180 vial 5   • albuterol sulfate  (90 " Base) MCG/ACT inhaler Inhale 2 puffs Every 4 (Four) Hours As Needed for Wheezing. 18 g 5   • aspirin (Aspirin Adult Low Strength) 81 MG EC tablet Take 1 tablet by mouth Daily. 30 tablet 3   • atorvastatin (LIPITOR) 40 MG tablet Take 1 tablet by mouth Daily. 90 tablet 3   • dexlansoprazole (Dexilant) 60 MG capsule Take 1 capsule by mouth Daily. 30 capsule 5   • dicyclomine (BENTYL) 20 MG tablet      • Dilantin 100 MG ER capsule Take 2 capsules by mouth 2 (Two) Times a Day. 120 capsule 5   • diphenhydrAMINE (Benadryl Allergy) 25 MG tablet Take 1 tablet by mouth Every 6 (Six) Hours As Needed for Itching or Allergies. 30 tablet 1   • HYDROcodone-acetaminophen (Norco) 5-325 MG per tablet Take 1 tablet by mouth Every 6 (Six) Hours As Needed for Moderate Pain . 12 tablet 0   • hyoscyamine (ANASPAZ,LEVSIN) 0.125 MG tablet Take 1 tablet by mouth Every 6 (Six) Hours As Needed for Cramping. 12 tablet 0   • lisinopril (PRINIVIL,ZESTRIL) 30 MG tablet Take 1 tablet by mouth Daily. 30 tablet 5   • loratadine (CLARITIN) 10 MG tablet Take 1 tablet by mouth Daily As Needed for Allergies. 30 tablet 5   • magnesium citrate 1.745 GM/30ML solution solution Take 296 mL by mouth Take As Directed for 2 doses. Take one full bottle at 4:00 PM and take second bottle at 10:00 PM. 592 mL 0   • methocarbamol (ROBAXIN) 750 MG tablet Take 1 tablet by mouth 2 (Two) Times a Day As Needed for Muscle Spasms. 60 tablet 5   • mirtazapine (REMERON) 30 MG tablet Take 1.5 tablets by mouth Every Night. 1 tablet 2-3 hours before bedtime 45 tablet 5   • Misc. Devices (BATH/SHOWER SEAT) misc 1 each Daily. 1 each 0   • montelukast (SINGULAIR) 10 MG tablet Take 1 tablet by mouth Every Night. 30 tablet 5   • mupirocin (BACTROBAN) 2 % ointment Apply  topically to the appropriate area as directed 3 (Three) Times a Day. 30 g 0   • ondansetron (Zofran) 4 MG tablet Take 1 tablet by mouth Every 8 (Eight) Hours As Needed for Nausea. 30 tablet 1   • phenazopyridine  "(Pyridium) 200 MG tablet Take 1 tablet by mouth 3 (Three) Times a Day As Needed for Bladder Spasms. 30 tablet 0   • Plecanatide 3 MG tablet Take 1 tablet by mouth Daily. 30 tablet 0   • polyethylene glycol (GoLYTELY) 236 g solution Starting at 6pm the day before procedure, drink 8 ounces every 30 minutes until all gone or stools are clear. May add flavor packet. 4000 mL 0   • potassium chloride 10 MEQ CR tablet Take 1 tablet by mouth Daily. 30 tablet 5   • promethazine (PHENERGAN) 25 MG tablet Take 1 tablet by mouth Every 6 (Six) Hours As Needed for Nausea or Vomiting. 30 tablet 1   • vitamin D (ERGOCALCIFEROL) 1.25 MG (10527 UT) capsule capsule Take 1 capsule by mouth Every 7 (Seven) Days. On Friday 5 capsule 5     No current facility-administered medications for this visit.        Allergies:      Allergies   Allergen Reactions   • Ciprofloxacin Anaphylaxis and Hives   • Mobic [Meloxicam] Other (See Comments)     Pt states, \"It make my feet and hands go numb and I can't hardly walk.\"    • Paxil [Paroxetine Hcl] Shortness Of Breath     Chest pain    • Peanut-Containing Drug Products Anaphylaxis   • Penicillins Anaphylaxis   • Pristiq [Desvenlafaxine Succinate Er] Dizziness   • Sulfa Antibiotics Anaphylaxis, Itching and Rash   • Doxycycline Hives   • Fish-Derived Products Hives   • Isosorbide Nitrate Rash     Rash, hives, had to use inhaler.    • Movantik [Naloxegol] Rash   • Buspar [Buspirone] Rash   • Clarithromycin Rash   • Clindamycin/Lincomycin Rash   • Codeine Rash   • Contrast Dye Itching and Rash   • Diltiazem Rash   • Gabapentin Rash   • Keflex [Cephalexin] Rash   • Linzess [Linaclotide] Rash   • Metoprolol Rash   • Prednisone Rash and Other (See Comments)     Face, feet, and legs go completely numb per patient   • Robitussin Cough+ Chest Max St [Dextromethorphan-Guaifenesin] Itching   • Shrimp (Diagnostic) Rash   • Spironolactone Rash   • Viibryd [Vilazodone Hcl] Itching and Rash   • Zoloft [Sertraline Hcl] " Hives and Itching        Past Surgical History:     Past Surgical History:   Procedure Laterality Date   • ABDOMINAL SURGERY     • BACK SURGERY     • BRAIN SURGERY  1986    Tumor removal    • CARDIAC CATHETERIZATION N/A 9/28/2018    Procedure: Left Heart Cath;  Surgeon: Leandro Daily MD;  Location: [facility] CATH INVASIVE LOCATION;  Service: Cardiology   • CHOLECYSTECTOMY     • COLONOSCOPY     • CYST REMOVAL      pilonidal cyst   • ELBOW EPICONDYLECTOMY Right 7/23/2020    Procedure: LATERAL EPICONDYLAR RELEASE;  Surgeon: Jose Ruiz MD;  Location: [facility] OR;  Service: Orthopedics;  Laterality: Right;   • ENDOSCOPY     • FRACTURE SURGERY Right     elbow   • KNEE ARTHROSCOPY Left 10/20/2017    Procedure: Diagnostic arthroscopy left knee with chondroplasty;  Surgeon: Marco Aguirre MD;  Location: [facility] OR;  Service:    • KNEE ARTHROSCOPY Left 1/11/2021    Procedure: KNEE DIAGNOSTIC ARTHROSCOPY WITH  CHONDROPLASTY patella, femoral and medial;  Surgeon: Raul Eagle MD;  Location: [facility] OR;  Service: Orthopedics;  Laterality: Left;   • KNEE SURGERY Right    • MOUTH SURGERY      FULL MOUTH EXTRACTION   • OTHER SURGICAL HISTORY      REPORTS 7 TICKS REMOVED FROM RIGHT ARM IN 2001 OR 2002   • TENNIS ELBOW RELEASE Right 7/23/2020    Procedure: RIGHT TENNIS ELBOW RELEASE;  Surgeon: Jose Ruiz MD;  Location: [facility] OR;  Service: Orthopedics;  Laterality: Right;   • TUMOR EXCISION      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  he  has not smoked in approx 3 weeks.   Vaping Use   • Vaping Use: Never used   Substance and Sexual Activity   • Alcohol use: No   • Drug use: No   • Sexual activity: Defer     Partners: Female     Birth control/protection: None       Family History:     Family History   Problem Relation Age of Onset   • Diabetes Mother    • Hypertension Mother    • Stroke Mother    • Diabetes Father    • Skin cancer Father    • Hypertension Father    • Heart attack Father    • Diabetes Brother    • Hypertension Brother    • Heart disease Maternal Aunt    • Heart disease Maternal Uncle    • Heart disease Paternal Aunt    • Heart disease Paternal Uncle    • Heart disease Maternal Grandmother    • Heart disease Maternal Grandfather    • Heart disease Paternal Grandmother    • Heart disease Paternal Grandfather        Review of Systems:     Review of Systems   Constitutional: Negative.    HENT: Negative.    Eyes: Negative.    Respiratory: Negative.    Cardiovascular: Negative.    Gastrointestinal: Negative.    Endocrine: Negative.    Musculoskeletal: Negative.    Allergic/Immunologic: Negative.    Neurological: Negative.    Hematological: Negative.    Psychiatric/Behavioral: Negative.        Physical Exam:     Physical Exam  Vitals and nursing note reviewed.   Constitutional:       Appearance: He is well-developed.   HENT:      Head: Normocephalic and atraumatic.   Eyes:      Conjunctiva/sclera: Conjunctivae normal.      Pupils: Pupils are equal, round, and reactive to light.   Cardiovascular:      Rate and Rhythm: Normal rate and regular rhythm.      Heart sounds: Normal heart sounds.   Pulmonary:      Effort: Pulmonary effort is normal.      Breath sounds: Normal breath sounds.   Abdominal:      General: Bowel sounds are normal.      Palpations: Abdomen is soft.   Musculoskeletal:         General: Normal range of motion.      Cervical back: Normal range of motion.   Skin:     General: Skin is warm and dry.   Neurological:      Mental  Status: He is alert and oriented to person, place, and time.      Deep Tendon Reflexes: Reflexes are normal and symmetric.   Psychiatric:         Behavior: Behavior normal.         Thought Content: Thought content normal.         Judgment: Judgment normal.         I have reviewed the following portions of the patient's history: allergies, current medications, past family history, past medical history, past social history, past surgical history, problem list and ROS and confirm it's accurate.      Procedure:       Assessment/Plan:   Renal calculus-we discussed the presence of the stone we discussed the various therapeutic options available including percutaneous nephrostolithotomy, lithotripsy.  We discussed the risks of lithotripsy including the passage of stones the development of a large string of stones in the distal ureter known as Steinstrasse.  In the 3% incidence of that we will need to proceed with a ureteroscopy for obstructing fragments.  Extremely rare incidence of renal hematoma.  And the significance of this.  We discussed the absolute relative indicators for intervention including the presence of sepsis, and pain we cannot control is the primary need for urgent intervention.  We discussed placement of a stent if indicated and the management of the stent as well.  He gives a history of passing for stones but his KUB is negative.  He gives a history of a CT but he did not bring the report or disc and has no idea what it showed nor does he know what the labs were.  BPH: Discussed the pathophysiology of BPH and obstruction.  We discussed the static and dynamic effect effects of BPH as well as using 5 alpha reductase inhibitors versus alpha blockade.  We discussed the indications for transurethral surgery as well.  And/ or other therapeutic options available including all of the newer techniques.  His postvoid residual is 13 cc  Urinary tract infection-he states he has been frequently urinating but he could  not give a urine today                This document has been electronically signed by KENDRA EAST MD July 1, 2021 14:49 EDT

## 2021-07-01 NOTE — TELEPHONE ENCOUNTER
Patient calling back, states that no new antibiotic was given when he went to urology.    Please advise.

## 2021-07-01 NOTE — TELEPHONE ENCOUNTER
----- Message from BALDOMERO Thao sent at 7/1/2021  2:13 PM EDT -----  I would rather he discuss this with urology today  ----- Message -----  From: Gissell Isidro MA  Sent: 7/1/2021   1:24 PM EDT  To: BALDOMERO Thao    Patient calling, states that he went to ER in [place] yesterday. They told him that he had a bad UTI and they prescribed him cephalexin, he has an allergy. He would like for you to prescribe a new antibiotic.   He has an appointment with urology today.     Please advise.

## 2021-07-01 NOTE — TELEPHONE ENCOUNTER
There are no antibiotics that I am aware of that he does not have an allergy too.  If he is willing to, he can take some benadryl before meds and I can try some of the meds that he has just had a rash to.    I do not know how further to advise him about antibiotics.    I will request his [facility] records.

## 2021-07-03 LAB
ERYTHROCYTE [DISTWIDTH] IN BLOOD BY AUTOMATED COUNT: 12.8 % (ref 11.6–15.4)
HCT VFR BLD AUTO: 43 % (ref 37.5–51)
HGB BLD-MCNC: 14.6 G/DL (ref 13–17.7)
MCH RBC QN AUTO: 32.8 PG (ref 26.6–33)
MCHC RBC AUTO-ENTMCNC: 34 G/DL (ref 31.5–35.7)
MCV RBC AUTO: 97 FL (ref 79–97)
PLATELET # BLD AUTO: 204 X10E3/UL (ref 150–450)
PSA SERPL-MCNC: 1 NG/ML (ref 0–4)
RBC # BLD AUTO: 4.45 X10E6/UL (ref 4.14–5.8)
WBC # BLD AUTO: 5.5 X10E3/UL (ref 3.4–10.8)

## 2021-07-07 PROBLEM — N20.0 RENAL CALCULUS: Status: ACTIVE | Noted: 2021-07-07

## 2021-07-08 ENCOUNTER — DOCUMENTATION (OUTPATIENT)
Dept: GASTROENTEROLOGY | Facility: CLINIC | Age: 51
End: 2021-07-08

## 2021-07-08 ENCOUNTER — TELEPHONE (OUTPATIENT)
Dept: FAMILY MEDICINE CLINIC | Facility: CLINIC | Age: 51
End: 2021-07-08

## 2021-07-08 NOTE — TELEPHONE ENCOUNTER
Patient calling back, states that he is scheduled for colonoscopy 8/2/2021. Called to see if he can get this sooner, they state that they do not have any sooner appts. Patient notified.     Patient states that he still cannot use the bathroom. He is wanting to be admitted into the hospital, he was notified that we do not have admission privileges.     Called GI and spoke with Oliva, she states that he can come in today and be seen if he wants, but they don't have admission privileges either and the best thing he can do is go to the ER and be admitted into the hospital that way. He states that he is allergic to the medication they are prescribing also.

## 2021-07-08 NOTE — TELEPHONE ENCOUNTER
----- Message from Ayanna Gibson sent at 7/8/2021 10:35 AM EDT -----    ----- Message -----  From: Gissell Isidro MA  Sent: 7/6/2021   4:39 PM EDT  To: BALDOMERO Thao    He states that he has done tried all of this and it is not helping. He states that he is not taking any more medication.   ----- Message -----  From: Tiera Valenzuela APRN  Sent: 7/6/2021   3:31 PM EDT  To: Gissell Isidro MA    I cannot admit him to the hospital.  If the emergency room physician does not see it necessary to admit him then there is not much else I can do about that.    He can take 1 capful of Miralax every 2 hours in a glass of juice, water, or gatorade for the next 24 hours.  He can also do a suppository, and enema or he may try to use a glove and remove stool from colon.    He can also drink warm fluids to try and stimulate his colon.  He can also try to massage/rub his abdomen on the left to help stool move.   ----- Message -----  From: Gissell Isidro MA  Sent: 7/6/2021   2:51 PM EDT  To: BALDOMERO Thao    Patient calling, states that he is allergic to the medication that was prescribed. He broke out in a rash and was itching bad. He took benadryl.     He states that he does not want any more medication. He states that he wants to be admitted into the hospital and them do a procedure to flush his system of the stool. He states that he cannot use the bathroom

## 2021-07-08 NOTE — PROGRESS NOTES
Patient's PCP called and said that patient was complaining of constipation and he is scheduled to have a procedure on 8-2-21 with Dr. Azar and we do not have anything sooner. He was offered different medicaion there but told them he was allergic. I offered to make patient an appointment to be seen in clinic and the  stated she would call him. Patient was advised to go to ER from his PCP and does not want to go.

## 2021-07-14 ENCOUNTER — OFFICE VISIT (OUTPATIENT)
Dept: FAMILY MEDICINE CLINIC | Facility: CLINIC | Age: 51
End: 2021-07-14

## 2021-07-14 VITALS
DIASTOLIC BLOOD PRESSURE: 80 MMHG | BODY MASS INDEX: 37.35 KG/M2 | HEART RATE: 86 BPM | HEIGHT: 67 IN | WEIGHT: 238 LBS | SYSTOLIC BLOOD PRESSURE: 140 MMHG | OXYGEN SATURATION: 98 % | TEMPERATURE: 96.6 F

## 2021-07-14 DIAGNOSIS — R30.0 DYSURIA: Primary | ICD-10-CM

## 2021-07-14 DIAGNOSIS — K59.09 CHRONIC CONSTIPATION: ICD-10-CM

## 2021-07-14 LAB
BILIRUB BLD-MCNC: NEGATIVE MG/DL
CLARITY, POC: ABNORMAL
COLOR UR: ABNORMAL
GLUCOSE UR STRIP-MCNC: NEGATIVE MG/DL
KETONES UR QL: NEGATIVE
LEUKOCYTE EST, POC: NEGATIVE
NITRITE UR-MCNC: NEGATIVE MG/ML
PH UR: 6 [PH] (ref 5–8)
PROT UR STRIP-MCNC: NEGATIVE MG/DL
RBC # UR STRIP: NEGATIVE /UL
SP GR UR: 1.02 (ref 1–1.03)
UROBILINOGEN UR QL: NORMAL

## 2021-07-14 PROCEDURE — 99213 OFFICE O/P EST LOW 20 MIN: CPT | Performed by: NURSE PRACTITIONER

## 2021-07-14 RX ORDER — POLYETHYLENE GLYCOL 3350 17 G/17G
17 POWDER, FOR SOLUTION ORAL DAILY
Qty: 24 EACH | Refills: 0 | Status: ON HOLD | COMMUNITY
Start: 2021-07-14 | End: 2021-08-02

## 2021-07-14 RX ORDER — AZITHROMYCIN 250 MG/1
TABLET, FILM COATED ORAL
Qty: 6 TABLET | Refills: 0 | Status: SHIPPED | OUTPATIENT
Start: 2021-07-14 | End: 2021-07-21

## 2021-07-14 RX ORDER — POLYETHYLENE GLYCOL 3350 17 G/17G
POWDER, FOR SOLUTION ORAL
Qty: 225 G | Refills: 0 | Status: CANCELLED | OUTPATIENT
Start: 2021-07-14

## 2021-07-14 NOTE — PROGRESS NOTES
Subjective   Jaquan Mckay is a 50 y.o. male.     Chief Complaint   Patient presents with   • Constipation       History of Present Illness     Abdominal pain-patient presents to the clinic today with continued complaints of abdominal pain.  Patient was noted to have stool in his colon on last KUB.  He has been to GI and urology.  He has dysuria today.  He was advised by this provider to do 1 capful of MiraLAX every 2 hours x24 hours until he was having bowel movements.  Patient declined to do this.  He was also given milk of mag bisacodyl tablets to take but reports that he developed a rash.  He has been prescribed Trulance per GI.  He did call the office multiple times last week demanding to be admitted to the hospital for cleanout.    The following portions of the patient's history were reviewed and updated as appropriate: CC, ROS, allergies, current medications, past family history, past medical history, past social history, past surgical history and problem list.      Review of Systems   Constitutional: Positive for fatigue. Negative for appetite change, unexpected weight gain and unexpected weight loss.   HENT: Negative for congestion, ear pain, postnasal drip, rhinorrhea, sore throat, swollen glands, trouble swallowing and voice change.    Eyes: Negative for pain and visual disturbance.   Respiratory: Negative for cough, chest tightness, shortness of breath and wheezing.    Cardiovascular: Negative for chest pain and palpitations.   Gastrointestinal: Positive for abdominal distention, constipation and nausea. Negative for abdominal pain, blood in stool, diarrhea and indigestion.   Genitourinary: Negative for dysuria, hematuria and urgency.   Musculoskeletal: Positive for arthralgias. Negative for back pain.   Skin: Negative for color change and skin lesions.   Allergic/Immunologic: Negative.    Neurological: Negative for dizziness, numbness, headache and confusion.   Hematological: Negative.   "  Psychiatric/Behavioral: Negative for sleep disturbance and suicidal ideas.   All other systems reviewed and are negative.      Objective     /80   Pulse 86   Temp 96.6 °F (35.9 °C) (Tympanic)   Ht 170.2 cm (67.01\")   Wt 108 kg (238 lb)   SpO2 98%   BMI 37.26 kg/m²     Physical Exam  Vitals reviewed.   Constitutional:       General: He is not in acute distress.     Appearance: Normal appearance. He is well-developed.   HENT:      Head: Normocephalic and atraumatic.      Comments: Oropharynx not examined.  Patient is presently wearing a face covering/mask due to COVID-19 pandemic.     Right Ear: Ear canal and external ear normal.      Left Ear: Tympanic membrane, ear canal and external ear normal.   Eyes:      General: No scleral icterus.     Pupils: Pupils are equal, round, and reactive to light.   Neck:      Thyroid: No thyromegaly.      Vascular: No JVD.   Cardiovascular:      Rate and Rhythm: Normal rate and regular rhythm.      Heart sounds: Normal heart sounds.   Pulmonary:      Effort: Pulmonary effort is normal.      Breath sounds: Normal breath sounds.   Abdominal:      General: Bowel sounds are normal.      Palpations: Abdomen is soft.      Tenderness: There is abdominal tenderness in the right upper quadrant, right lower quadrant, epigastric area, periumbilical area and left lower quadrant.   Musculoskeletal:      Cervical back: Normal range of motion and neck supple.   Skin:     General: Skin is warm and dry.      Capillary Refill: Capillary refill takes less than 2 seconds.   Neurological:      Mental Status: He is alert and oriented to person, place, and time.      Cranial Nerves: No cranial nerve deficit.      Coordination: Coordination normal.      Gait: Gait normal.   Psychiatric:         Behavior: Behavior normal.         Thought Content: Thought content normal.         Judgment: Judgment normal.       Assessment/Plan     Diagnoses and all orders for this visit:    1. Dysuria " (Primary)  -     POCT urinalysis dipstick, manual  -     Discontinue: azithromycin (Zithromax Z-Clark) 250 MG tablet; Take 2 tablets by mouth on day 1, then 1 tablet daily on days 2-5  Dispense: 6 tablet; Refill: 0    2. Chronic constipation  -     POCT urinalysis dipstick, manual  -     polyethylene glycol (MIRALAX) 17 g packet; Take 17 g by mouth Daily.  Dispense: 24 each; Refill: 0    Other orders  -     Cancel: polyethylene glycol (MiraLax) 17 GM/SCOOP powder; 1 capful Q 2 hours with 8 oz water for 24 hours.  Dispense: 225 g; Refill: 0       Understands disease processes and need for medications.  Understands reasons for urgent and emergent care.  Patient (& family) verbalized agreement for treatment plan.   Emotional support and active listening provided.  Patient provided time to verbalize feelings.    Reviewed most recent imaging.     RTC 1-2 weeks, sooner if needed.  Report any worsening symptoms.              This document has been electronically signed by:  BALDOMERO Thao, FNP-C    Dragon disclaimer:  Much of this encounter note is an electronic transcription/translation of spoken language to printed text. The electronic translation of spoken language may permit erroneous, or at times, nonsensical words or phrases to be inadvertently transcribed; Although I have reviewed the note for such errors, some may still exist.

## 2021-07-17 ENCOUNTER — APPOINTMENT (OUTPATIENT)
Dept: CT IMAGING | Facility: HOSPITAL | Age: 51
End: 2021-07-17

## 2021-07-17 ENCOUNTER — HOSPITAL ENCOUNTER (EMERGENCY)
Facility: HOSPITAL | Age: 51
Discharge: HOME | End: 2021-07-17
Admitting: EMERGENCY MEDICINE

## 2021-07-17 VITALS
HEIGHT: 67 IN | RESPIRATION RATE: 18 BRPM | BODY MASS INDEX: 37.67 KG/M2 | DIASTOLIC BLOOD PRESSURE: 82 MMHG | OXYGEN SATURATION: 100 % | TEMPERATURE: 98.7 F | WEIGHT: 240 LBS | HEART RATE: 84 BPM | SYSTOLIC BLOOD PRESSURE: 148 MMHG

## 2021-07-17 DIAGNOSIS — K59.03 DRUG-INDUCED CONSTIPATION: Primary | ICD-10-CM

## 2021-07-17 LAB
ALBUMIN SERPL-MCNC: 4.4 G/DL (ref 3.5–5.2)
ALBUMIN/GLOB SERPL: 1.2 G/DL
ALP SERPL-CCNC: 108 U/L (ref 39–117)
ALT SERPL W P-5'-P-CCNC: 25 U/L (ref 1–41)
ANION GAP SERPL CALCULATED.3IONS-SCNC: 9.7 MMOL/L (ref 5–15)
AST SERPL-CCNC: 25 U/L (ref 1–40)
BASOPHILS # BLD AUTO: 0.03 10*3/MM3 (ref 0–0.2)
BASOPHILS NFR BLD AUTO: 0.4 % (ref 0–1.5)
BILIRUB SERPL-MCNC: 0.3 MG/DL (ref 0–1.2)
BILIRUB UR QL STRIP: NEGATIVE
BUN SERPL-MCNC: 14 MG/DL (ref 6–20)
BUN/CREAT SERPL: 12.5 (ref 7–25)
CALCIUM SPEC-SCNC: 9.3 MG/DL (ref 8.6–10.5)
CHLORIDE SERPL-SCNC: 100 MMOL/L (ref 98–107)
CLARITY UR: CLEAR
CO2 SERPL-SCNC: 24.3 MMOL/L (ref 22–29)
COLOR UR: YELLOW
CREAT SERPL-MCNC: 1.12 MG/DL (ref 0.76–1.27)
DEPRECATED RDW RBC AUTO: 43.3 FL (ref 37–54)
EOSINOPHIL # BLD AUTO: 0.07 10*3/MM3 (ref 0–0.4)
EOSINOPHIL NFR BLD AUTO: 0.9 % (ref 0.3–6.2)
ERYTHROCYTE [DISTWIDTH] IN BLOOD BY AUTOMATED COUNT: 12.6 % (ref 12.3–15.4)
GFR SERPL CREATININE-BSD FRML MDRD: 70 ML/MIN/1.73
GLOBULIN UR ELPH-MCNC: 3.6 GM/DL
GLUCOSE SERPL-MCNC: 89 MG/DL (ref 65–99)
GLUCOSE UR STRIP-MCNC: NEGATIVE MG/DL
HCT VFR BLD AUTO: 47.8 % (ref 37.5–51)
HGB BLD-MCNC: 16.8 G/DL (ref 13–17.7)
HGB UR QL STRIP.AUTO: NEGATIVE
HOLD SPECIMEN: NORMAL
HOLD SPECIMEN: NORMAL
IMM GRANULOCYTES # BLD AUTO: 0.01 10*3/MM3 (ref 0–0.05)
IMM GRANULOCYTES NFR BLD AUTO: 0.1 % (ref 0–0.5)
KETONES UR QL STRIP: NEGATIVE
LEUKOCYTE ESTERASE UR QL STRIP.AUTO: NEGATIVE
LIPASE SERPL-CCNC: 26 U/L (ref 13–60)
LYMPHOCYTES # BLD AUTO: 1.97 10*3/MM3 (ref 0.7–3.1)
LYMPHOCYTES NFR BLD AUTO: 25.3 % (ref 19.6–45.3)
MCH RBC QN AUTO: 32.8 PG (ref 26.6–33)
MCHC RBC AUTO-ENTMCNC: 35.1 G/DL (ref 31.5–35.7)
MCV RBC AUTO: 93.4 FL (ref 79–97)
MONOCYTES # BLD AUTO: 1.07 10*3/MM3 (ref 0.1–0.9)
MONOCYTES NFR BLD AUTO: 13.7 % (ref 5–12)
NEUTROPHILS NFR BLD AUTO: 4.64 10*3/MM3 (ref 1.7–7)
NEUTROPHILS NFR BLD AUTO: 59.6 % (ref 42.7–76)
NITRITE UR QL STRIP: NEGATIVE
NRBC BLD AUTO-RTO: 0 /100 WBC (ref 0–0.2)
PH UR STRIP.AUTO: 6.5 [PH] (ref 5–8)
PLATELET # BLD AUTO: 193 10*3/MM3 (ref 140–450)
PMV BLD AUTO: 9.9 FL (ref 6–12)
POTASSIUM SERPL-SCNC: 4.7 MMOL/L (ref 3.5–5.2)
PROT SERPL-MCNC: 8 G/DL (ref 6–8.5)
PROT UR QL STRIP: NEGATIVE
RBC # BLD AUTO: 5.12 10*6/MM3 (ref 4.14–5.8)
SODIUM SERPL-SCNC: 134 MMOL/L (ref 136–145)
SP GR UR STRIP: 1.01 (ref 1–1.03)
UROBILINOGEN UR QL STRIP: NORMAL
WBC # BLD AUTO: 7.79 10*3/MM3 (ref 3.4–10.8)
WHOLE BLOOD HOLD SPECIMEN: NORMAL

## 2021-07-17 PROCEDURE — 83690 ASSAY OF LIPASE: CPT | Performed by: PHYSICIAN ASSISTANT

## 2021-07-17 PROCEDURE — 81003 URINALYSIS AUTO W/O SCOPE: CPT | Performed by: PHYSICIAN ASSISTANT

## 2021-07-17 PROCEDURE — 99283 EMERGENCY DEPT VISIT LOW MDM: CPT

## 2021-07-17 PROCEDURE — 25010000002 DICYCLOMINE PER 20 MG: Performed by: PHYSICIAN ASSISTANT

## 2021-07-17 PROCEDURE — 25010000002 METHYLNALTREXONE 12 MG/0.6ML SOLUTION: Performed by: PHYSICIAN ASSISTANT

## 2021-07-17 PROCEDURE — 85025 COMPLETE CBC W/AUTO DIFF WBC: CPT | Performed by: PHYSICIAN ASSISTANT

## 2021-07-17 PROCEDURE — 74176 CT ABD & PELVIS W/O CONTRAST: CPT

## 2021-07-17 PROCEDURE — 80053 COMPREHEN METABOLIC PANEL: CPT | Performed by: PHYSICIAN ASSISTANT

## 2021-07-17 PROCEDURE — 96372 THER/PROPH/DIAG INJ SC/IM: CPT

## 2021-07-17 PROCEDURE — 74176 CT ABD & PELVIS W/O CONTRAST: CPT | Performed by: RADIOLOGY

## 2021-07-17 RX ORDER — DICYCLOMINE HYDROCHLORIDE 10 MG/ML
20 INJECTION INTRAMUSCULAR ONCE
Status: COMPLETED | OUTPATIENT
Start: 2021-07-17 | End: 2021-07-17

## 2021-07-17 RX ORDER — DOCUSATE SODIUM 100 MG/1
100 CAPSULE, LIQUID FILLED ORAL 2 TIMES DAILY PRN
Qty: 60 CAPSULE | Refills: 0 | Status: ON HOLD | OUTPATIENT
Start: 2021-07-17 | End: 2021-08-02

## 2021-07-17 RX ORDER — SODIUM CHLORIDE 0.9 % (FLUSH) 0.9 %
10 SYRINGE (ML) INJECTION AS NEEDED
Status: DISCONTINUED | OUTPATIENT
Start: 2021-07-17 | End: 2021-07-17 | Stop reason: HOSPADM

## 2021-07-17 RX ADMIN — DICYCLOMINE HYDROCHLORIDE 20 MG: 20 INJECTION, SOLUTION INTRAMUSCULAR at 15:00

## 2021-07-17 RX ADMIN — METHYLNALTREXONE BROMIDE 12 MG: 12 INJECTION, SOLUTION SUBCUTANEOUS at 15:29

## 2021-07-17 NOTE — ED PROVIDER NOTES
"Subjective     History provided by:  Patient   used: No    Constipation  Severity:  Moderate  Time since last bowel movement:  4 weeks  Timing:  Constant  Progression:  Worsening  Chronicity:  New  Context: narcotics    Context comment:  Pt takes norco daily.   Stool description:  None produced  Relieved by:  Nothing  Worsened by:  Nothing  Ineffective treatments:  Stool softeners, Miralax and laxatives  Associated symptoms: abdominal pain and back pain        Review of Systems   Constitutional: Negative.    HENT: Negative.    Eyes: Negative.    Respiratory: Negative.    Cardiovascular: Negative.    Gastrointestinal: Positive for abdominal pain and constipation.   Endocrine: Negative.    Genitourinary: Negative.    Musculoskeletal: Positive for back pain.   Skin: Negative.    Allergic/Immunologic: Negative.    Neurological: Negative.    Hematological: Negative.    Psychiatric/Behavioral: Negative.    All other systems reviewed and are negative.      Past Medical History:   Diagnosis Date   • Allergic    • Anxiety    • Arthritis    • Asthma    • Body piercing     REPORTS CYLICONE IN EARS   • Clotting disorder (CMS/HCC) 2004    had a knee surgery   • Coronary artery disease    • Depression    • DVT (deep venous thrombosis) (CMS/Formerly [facility])     RIGHT RIGHT KNEE AFTER SURGERY YEARS AGO IN 2001 OR 2004   • Elevated cholesterol    • Gastric ulcer    • GERD (gastroesophageal reflux disease)    • H/O migraine    • Headache    • Heart attack (CMS/Formerly [facility])     REPORTS \"LIGHT HEART ATTACK A LONG TIME AGO\"  \"EARLY 90'S\"   • History of seizures     REPORTS LAST EPISODE WAS AROUND 1995.   • Hostility    • Hyperlipidemia    • Hypertension    • Knee pain, acute     Left   • Low back pain    • Lyme disease    • Migraine    • MRSA (methicillin resistant Staphylococcus aureus)     REPORTS LAST TESTED + 2004. WAS TREATED HE REPORTS.  RIGHT ARM, RIGHT KNEE.   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    • Obesity    • Poor historian    • Carl " "Mountain spotted fever    • Seizures (CMS/HCC)    • Sleep apnea    • Tattoo    • Wears glasses        Allergies   Allergen Reactions   • Ciprofloxacin Anaphylaxis and Hives   • Mobic [Meloxicam] Other (See Comments)     Pt states, \"It make my feet and hands go numb and I can't hardly walk.\"    • Paxil [Paroxetine Hcl] Shortness Of Breath     Chest pain    • Peanut-Containing Drug Products Anaphylaxis   • Penicillins Anaphylaxis   • Pristiq [Desvenlafaxine Succinate Er] Dizziness   • Sulfa Antibiotics Anaphylaxis, Itching and Rash   • Doxycycline Hives   • Fish-Derived Products Hives   • Isosorbide Nitrate Rash     Rash, hives, had to use inhaler.    • Movantik [Naloxegol] Rash   • Buspar [Buspirone] Rash   • Clarithromycin Rash   • Clindamycin/Lincomycin Rash   • Codeine Rash   • Contrast Dye Itching and Rash   • Diltiazem Rash   • Gabapentin Rash   • Keflex [Cephalexin] Rash   • Linzess [Linaclotide] Rash   • Metoprolol Rash   • Prednisone Rash and Other (See Comments)     Face, feet, and legs go completely numb per patient   • Robitussin Cough+ Chest Max St [Dextromethorphan-Guaifenesin] Itching   • Shrimp (Diagnostic) Rash   • Spironolactone Rash   • Viibryd [Vilazodone Hcl] Itching and Rash   • Zoloft [Sertraline Hcl] Hives and Itching       Past Surgical History:   Procedure Laterality Date   • ABDOMINAL SURGERY     • BACK SURGERY     • BRAIN SURGERY  1986    Tumor removal    • CARDIAC CATHETERIZATION N/A 9/28/2018    Procedure: Left Heart Cath;  Surgeon: Leandro Daily MD;  Location: [facility] CATH INVASIVE LOCATION;  Service: Cardiology   • CHOLECYSTECTOMY     • COLONOSCOPY     • CYST REMOVAL      pilonidal cyst   • ELBOW EPICONDYLECTOMY Right 7/23/2020    Procedure: LATERAL EPICONDYLAR RELEASE;  Surgeon: Jose Ruiz MD;  Location: [facility] OR;  Service: Orthopedics;  Laterality: Right;   • ENDOSCOPY     • FRACTURE SURGERY Right     elbow   • KNEE ARTHROSCOPY Left 10/20/2017    Procedure: Diagnostic " arthroscopy left knee with chondroplasty;  Surgeon: Marco Aguirre MD;  Location: [facility] OR;  Service:    • KNEE ARTHROSCOPY Left 1/11/2021    Procedure: KNEE DIAGNOSTIC ARTHROSCOPY WITH  CHONDROPLASTY patella, femoral and medial;  Surgeon: Raul Eagle MD;  Location: [facility] OR;  Service: Orthopedics;  Laterality: Left;   • KNEE SURGERY Right    • MOUTH SURGERY      FULL MOUTH EXTRACTION   • OTHER SURGICAL HISTORY      REPORTS 7 TICKS REMOVED FROM RIGHT ARM IN 2001 OR 2002   • TENNIS ELBOW RELEASE Right 7/23/2020    Procedure: RIGHT TENNIS ELBOW RELEASE;  Surgeon: Jose Ruiz MD;  Location: [facility] OR;  Service: Orthopedics;  Laterality: Right;   • TUMOR EXCISION      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       Family History   Problem Relation Age of Onset   • Diabetes Mother    • Hypertension Mother    • Stroke Mother    • Diabetes Father    • Skin cancer Father    • Hypertension Father    • Heart attack Father    • Diabetes Brother    • Hypertension Brother    • Heart disease Maternal Aunt    • Heart disease Maternal Uncle    • Heart disease Paternal Aunt    • Heart disease Paternal Uncle    • Heart disease Maternal Grandmother    • Heart disease Maternal Grandfather    • Heart disease Paternal Grandmother    • Heart disease Paternal Grandfather        Social History     Socioeconomic History   • Marital status:      Spouse name: Becca   • Number of children: 2   • Years of education: 12   • Highest education level: Not on file   Tobacco Use   • Smoking status: Current Every Day Smoker     Packs/day: 1.50     Years: 17.00     Pack years: 25.50     Types: Cigars, Cigarettes     Start date: 5/5/2010   • Smokeless tobacco: Never Used   • Tobacco comment: still uses 1.5 packs a day.  he has not smoked in approx 3 weeks.   Vaping Use   • Vaping Use: Never used   Substance and Sexual Activity   • Alcohol use: No   • Drug use: No   • Sexual activity: Defer     Partners: Female     Birth  control/protection: None           Objective   Physical Exam  Vitals and nursing note reviewed.   Constitutional:       Appearance: Normal appearance. He is normal weight.   HENT:      Head: Normocephalic and atraumatic.      Right Ear: External ear normal.      Left Ear: External ear normal.      Nose: Nose normal.      Mouth/Throat:      Mouth: Mucous membranes are moist.      Pharynx: Oropharynx is clear.   Eyes:      Extraocular Movements: Extraocular movements intact.      Conjunctiva/sclera: Conjunctivae normal.      Pupils: Pupils are equal, round, and reactive to light.   Cardiovascular:      Rate and Rhythm: Normal rate and regular rhythm.      Pulses: Normal pulses.      Heart sounds: Normal heart sounds.   Pulmonary:      Effort: Pulmonary effort is normal.      Breath sounds: Normal breath sounds.   Abdominal:      General: Abdomen is flat. Bowel sounds are normal.      Palpations: Abdomen is soft.   Musculoskeletal:         General: Normal range of motion.      Cervical back: Normal range of motion and neck supple.   Skin:     General: Skin is warm and dry.      Capillary Refill: Capillary refill takes less than 2 seconds.   Neurological:      General: No focal deficit present.      Mental Status: He is alert and oriented to person, place, and time. Mental status is at baseline.   Psychiatric:         Mood and Affect: Mood normal.         Behavior: Behavior normal.         Thought Content: Thought content normal.         Judgment: Judgment normal.         Procedures           ED Course  ED Course as of Jul 17 1532   Sat Jul 17, 2021   1510 MPRESSION:  1.  No acute findings.  2.  No renal or ureteral stones. No hydronephrosis.  3.  Other nonacute and incidental findings as above.     This report was finalized on 7/17/2021 3:02 PM by Dr. Raul Calixto MD.           Specimen Collected: 07/17/21 14:59         CT Abdomen Pelvis Without Contrast [ML]   1525 Pt instructed to continue medications started by  PCP and to f/u with PCP. Discussed sx that would warrant return to the ED.     [ML]      ED Course User Index  [ML] Tegan Bell PA                                           MDM  Number of Diagnoses or Management Options     Amount and/or Complexity of Data Reviewed  Clinical lab tests: ordered and reviewed  Tests in the radiology section of CPT®: ordered and reviewed    Risk of Complications, Morbidity, and/or Mortality  Presenting problems: low  Diagnostic procedures: low  Management options: low    Patient Progress  Patient progress: improved      Final diagnoses:   Drug-induced constipation       ED Disposition  ED Disposition     ED Disposition Condition Comment    Discharge Stable           Tiera Valenzuela, APRN  [address] [facility] [address]  [phone]    Schedule an appointment as soon as possible for a visit in 1 day           Medication List      New Prescriptions    docusate sodium 100 MG capsule  Commonly known as: COLACE  Take 1 capsule by mouth 2 (Two) Times a Day As Needed for Constipation.           Where to Get Your Medications      You can get these medications from any pharmacy    Bring a paper prescription for each of these medications  · docusate sodium 100 MG capsule          Tegan Bell PA  07/17/21 1523       Tegan Bell PA  07/17/21 1532

## 2021-07-21 ENCOUNTER — OFFICE VISIT (OUTPATIENT)
Dept: UROLOGY | Facility: CLINIC | Age: 51
End: 2021-07-21

## 2021-07-21 VITALS — BODY MASS INDEX: 37.67 KG/M2 | HEIGHT: 67 IN | WEIGHT: 240 LBS

## 2021-07-21 DIAGNOSIS — N40.0 BPH WITHOUT OBSTRUCTION/LOWER URINARY TRACT SYMPTOMS: Primary | ICD-10-CM

## 2021-07-21 DIAGNOSIS — R33.9 INCOMPLETE BLADDER EMPTYING: ICD-10-CM

## 2021-07-21 DIAGNOSIS — R10.9 BILATERAL FLANK PAIN: ICD-10-CM

## 2021-07-21 DIAGNOSIS — N30.10 INTERSTITIAL CYSTITIS: ICD-10-CM

## 2021-07-21 LAB
BILIRUB BLD-MCNC: NEGATIVE MG/DL
CLARITY, POC: CLEAR
COLOR UR: NORMAL
GLUCOSE UR STRIP-MCNC: NEGATIVE MG/DL
KETONES UR QL: NEGATIVE
LEUKOCYTE EST, POC: NEGATIVE
NITRITE UR-MCNC: NEGATIVE MG/ML
PH UR: 6.5 [PH] (ref 5–8)
PROT UR STRIP-MCNC: NEGATIVE MG/DL
RBC # UR STRIP: NEGATIVE /UL
SP GR UR: 1.02 (ref 1–1.03)
UROBILINOGEN UR QL: NORMAL

## 2021-07-21 PROCEDURE — 51798 US URINE CAPACITY MEASURE: CPT | Performed by: NURSE PRACTITIONER

## 2021-07-21 PROCEDURE — 99214 OFFICE O/P EST MOD 30 MIN: CPT | Performed by: NURSE PRACTITIONER

## 2021-07-21 RX ORDER — TAMSULOSIN HYDROCHLORIDE 0.4 MG/1
1 CAPSULE ORAL DAILY
Qty: 30 CAPSULE | Refills: 5 | Status: SHIPPED | OUTPATIENT
Start: 2021-07-21 | End: 2021-07-30

## 2021-07-21 RX ORDER — ONDANSETRON 4 MG/1
4 TABLET, FILM COATED ORAL EVERY 8 HOURS PRN
Qty: 20 TABLET | Refills: 0 | Status: SHIPPED | OUTPATIENT
Start: 2021-07-21 | End: 2022-02-03

## 2021-07-21 RX ORDER — PHENAZOPYRIDINE HYDROCHLORIDE 200 MG/1
200 TABLET, FILM COATED ORAL 3 TIMES DAILY PRN
Qty: 30 TABLET | Refills: 0 | Status: SHIPPED | OUTPATIENT
Start: 2021-07-21 | End: 2022-02-03

## 2021-07-21 NOTE — PROGRESS NOTES
"Chief Complaint  Nephrolithiasis and Urinary Tract Infection    Subjective          Jaquan Mckay presents to [facility]  History of Present Illness     Mr. Jaquan Mckay is a pleasant 50-year-old male who returns to clinic today for follow-up on urinary retention.  He is a very poor historian.  Patient is an ER follow-up.  States he presented to the ER at [facility] on 07/17 secondary to lower abdominal pain, bilateral flank pain and dysuria.  He was diagnosed with drug-induced constipation and recommended to follow-up with GI which he saw today.    He had a CT scan which we both reviewed/discussed showing degenerative changes in his lower lumbar spine with multilevel stenosis stable.  No renal or ureteral stones were noted on the CT, patient most likely passed all his stones.     He is in no apparent discomfort today, reports adequate urine output since last visit. HE Was also evaluated in clinic 2 weeks ago post ER visit at [facility] in [place] with similar complaints. He reports he was told he had a urinary tract infection with a stone in the appendix.     He reports He passed 4 stones.  But he is not sure of any of the information associated with it.     His urine dipstick today is completely negative of any infection, it is negative for gross/microscopic hematuria.  His PVR is 26 cc, his IPSS score is 11.    Objective   Vital Signs:   Ht 170.2 cm (67\")   Wt 109 kg (240 lb)   BMI 37.59 kg/m²     Physical Exam  Constitutional:       General: He is not in acute distress.     Appearance: He is well-developed.   HENT:      Head: Normocephalic and atraumatic.      Right Ear: External ear normal.      Left Ear: External ear normal.   Eyes:      General:         Right eye: No discharge.         Left eye: No discharge.      Conjunctiva/sclera: Conjunctivae normal.      Pupils: Pupils are equal, round, and reactive to light.   Neck:      Thyroid: No thyromegaly.      " Trachea: No tracheal deviation.   Cardiovascular:      Rate and Rhythm: Normal rate and regular rhythm.      Heart sounds: No murmur heard.   No friction rub.   Pulmonary:      Effort: Pulmonary effort is normal. No respiratory distress.      Breath sounds: Normal breath sounds. No stridor.   Abdominal:      General: Bowel sounds are normal. There is distension.      Palpations: Abdomen is soft.      Tenderness: There is abdominal tenderness. There is guarding.   Genitourinary:     Penis: Normal and uncircumcised. No tenderness or discharge.       Testes: Normal.      Rectum: Normal. Guaiac result negative.   Musculoskeletal:         General: Tenderness (  Bilateral flank pain) present. No deformity. Normal range of motion.      Cervical back: Normal range of motion and neck supple.   Skin:     General: Skin is warm and dry.      Capillary Refill: Capillary refill takes less than 2 seconds.      Coloration: Skin is not pale.   Neurological:      Mental Status: He is alert and oriented to person, place, and time.      Cranial Nerves: No cranial nerve deficit.      Coordination: Coordination normal.   Psychiatric:         Behavior: Behavior normal.         Thought Content: Thought content normal.         Judgment: Judgment normal.        Result Review :     CMP    CMP 4/12/21 6/10/21 7/17/21   Glucose 91 97 89   BUN 15 13 14   Creatinine 1.08 1.22 1.12   eGFR Non African Am 73 63 70   Sodium 136 139 134 (A)   Potassium 3.9 4.0 4.7   Chloride 105 105 100   Calcium 8.4 (A) 9.1 9.3   Albumin 3.76 4.07 4.40   Total Bilirubin 0.3 0.2 0.3   Alkaline Phosphatase 101 98 108   AST (SGOT) 20 32 25   ALT (SGPT) 19 21 25   (A) Abnormal value       Comments are available for some flowsheets but are not being displayed.           CBC w/diff    CBC w/Diff 6/10/21 7/1/21 7/17/21   WBC 9.12 5.5 7.79   RBC 4.67 4.45 5.12   Hemoglobin 15.3 14.6 16.8   Hematocrit 42.9 43.0 47.8   MCV 91.9 97 93.4   MCH 32.8 32.8 32.8   MCHC 35.7 34.0  35.1   RDW 12.9 12.8 12.6   Platelets 199 204 193   Neutrophil Rel % 58.8  59.6   Immature Granulocyte Rel % 0.2  0.1   Lymphocyte Rel % 27.6  25.3   Monocyte Rel % 12.1 (A)  13.7 (A)   Eosinophil Rel % 1.0  0.9   Basophil Rel % 0.3  0.4   (A) Abnormal value            UA    Urinalysis 7/14/21 7/17/21 7/21/21   Specific Gravity, UA  1.008    Ketones, UA Negative Negative Negative   Blood, UA  Negative    Leukocytes, UA Negative Negative Negative   Nitrite, UA  Negative                Data reviewed: Radiologic studies CT abdomen and pelvis done 07/17/2021          Assessment and Plan    Diagnoses and all orders for this visit:    1. BPH without obstruction/lower urinary tract symptoms (Primary)  -     PSA, Total & Free    2. Incomplete bladder emptying  -     Bladder Scan  -     phenazopyridine (Pyridium) 200 MG tablet; Take 1 tablet by mouth 3 (Three) Times a Day As Needed for Bladder Spasms.  Dispense: 30 tablet; Refill: 0  -     ondansetron (Zofran) 4 MG tablet; Take 1 tablet by mouth Every 8 (Eight) Hours As Needed for Nausea.  Dispense: 20 tablet; Refill: 0  -     tamsulosin (FLOMAX) 0.4 MG capsule 24 hr capsule; Take 1 capsule by mouth Daily.  Dispense: 30 capsule; Refill: 5  -     PSA, Total & Free    3. Interstitial cystitis  -     phenazopyridine (Pyridium) 200 MG tablet; Take 1 tablet by mouth 3 (Three) Times a Day As Needed for Bladder Spasms.  Dispense: 30 tablet; Refill: 0  -     ondansetron (Zofran) 4 MG tablet; Take 1 tablet by mouth Every 8 (Eight) Hours As Needed for Nausea.  Dispense: 20 tablet; Refill: 0  -     tamsulosin (FLOMAX) 0.4 MG capsule 24 hr capsule; Take 1 capsule by mouth Daily.  Dispense: 30 capsule; Refill: 5  -     POC Urinalysis Dipstick, Automated  -     Urine Culture - Urine, Urine, Random Void    4. Bilateral flank pain    BPH/DYSURIA: Discussed the pathophysiology of BPH and obstruction.  We discussed the static and dynamic effect effects of BPH as well as using 5 alpha  reductase inhibitors versus alpha blockade.  We discussed the indications for transurethral surgery as well.  And/ or other therapeutic options available including all of the newer techniques.  He has no real symptomatology referable to his prostate other than very mild enlargement rather than proceed with an expensive workup he doesn't need I'm and recommending daily Flomax.    IBS- Patient has significant irritable bowel syndrome, characterized by extreme amounts of constipation. We spoke about the impact of this on bladder function.  We spoke about  its relationship to recurrent urinary tract infections. We discussed the need for increasing p.o. fluid intake to at least 2 to 3 L of water daily, and discussed the physiology of colonic motility as well as use of MiraLAX as a bulk laxative versus the newer class of serotonin uptake blockers such as Linzess.  We stressed the need for a daily bowel movement and discussed the [place] stool scale at length.      We also discussed a referral to the GI nurse practitioner,  PT [facility] TODAY     We will follow-up in clinic as discussed.    Patient is agreeable plan of care.      Follow Up   Return in about 1 month (around 8/21/2021) for Next scheduled follow up/check UA/PSA.  Patient was given instructions and counseling regarding his condition or for health maintenance advice. Please see specific information pulled into the AVS if appropriate.

## 2021-07-21 NOTE — PATIENT INSTRUCTIONS
Acute Urinary Retention, Male    Acute urinary retention means that you cannot pee (urinate) at all, or that you pee too little and your bladder is not emptied completely. If it is not treated, it can lead to kidney damage or other serious problems.  Follow these instructions at home:  · Take over-the-counter and prescription medicines only as told by your doctor. Ask your doctor what medicines you should stay away from. Do not take any medicine unless your doctor says it is okay to do so.  · If you were sent home with a tube that drains the bladder (catheter), take care of it as told by your doctor.  · Drink enough fluid to keep your pee clear or pale yellow.  · If you were given an antibiotic, take it as told by your doctor. Do not stop taking the antibiotic even if you start to feel better.  · Do not use any products that contain nicotine or tobacco, such as cigarettes and e-cigarettes. If you need help quitting, ask your doctor.  · Watch for changes in your symptoms. Tell your doctor about them.  · If told, track changes in your blood pressure at home. Tell your doctor about them.  · Keep all follow-up visits as told by your doctor. This is important.  Contact a doctor if:  · You have spasms or you leak pee when you have spasms.  Get help right away if:  · You have chills or a fever.  · You have a tube that drains the bladder and:  ? The tube stops draining pee.  ? The tube falls out.  · You have blood in your pee.  Summary  · Acute urinary retention means that you have problems peeing. It may mean that you cannot pee at all, or that you pee too little.  · If this condition is not treated, it can lead to kidney damage or other serious problems.  · If you were sent home with a tube that drains the bladder, take care of it as told by your doctor.  · Monitor any changes in your symptoms. Tell your doctor about any changes.  This information is not intended to replace advice given to you by your health care  provider. Make sure you discuss any questions you have with your health care provider.  Document Revised: 12/22/2020 Document Reviewed: 01/19/2018  [employer] Patient Education © 2021 [employer]  Benign Prostatic Hyperplasia    Benign prostatic hyperplasia (BPH) is an enlarged prostate gland that is caused by the normal aging process and not by cancer. The prostate is a walnut-sized gland that is involved in the production of semen. It is located in front of the rectum and below the bladder. The bladder stores urine and the urethra is the tube that carries the urine out of the body. The prostate may get bigger as a man gets older.  An enlarged prostate can press on the urethra. This can make it harder to pass urine. The build-up of urine in the bladder can cause infection. Back pressure and infection may progress to bladder damage and kidney (renal) failure.  What are the causes?  This condition is part of a normal aging process. However, not all men develop problems from this condition. If the prostate enlarges away from the urethra, urine flow will not be blocked. If it enlarges toward the urethra and compresses it, there will be problems passing urine.  What increases the risk?  This condition is more likely to develop in men over the age of 50 years.  What are the signs or symptoms?  Symptoms of this condition include:  · Getting up often during the night to urinate.  · Needing to urinate frequently during the day.  · Difficulty starting urine flow.  · Decrease in size and strength of your urine stream.  · Leaking (dribbling) after urinating.  · Inability to pass urine. This needs immediate treatment.  · Inability to completely empty your bladder.  · Pain when you pass urine. This is more common if there is also an infection.  · Urinary tract infection (UTI).  How is this diagnosed?  This condition is diagnosed based on your medical history, a physical exam, and your symptoms. Tests will also be done, such  as:  · A post-void bladder scan. This measures any amount of urine that may remain in your bladder after you finish urinating.  · A digital rectal exam. In a rectal exam, your health care provider checks your prostate by putting a lubricated, gloved finger into your rectum to feel the back of your prostate gland. This exam detects the size of your gland and any abnormal lumps or growths.  · An exam of your urine (urinalysis).  · A prostate specific antigen (PSA) screening. This is a blood test used to screen for prostate cancer.  · An ultrasound. This test uses sound waves to electronically produce a picture of your prostate gland.  Your health care provider may refer you to a specialist in kidney and prostate diseases (urologist).  How is this treated?  Once symptoms begin, your health care provider will monitor your condition (active surveillance or watchful waiting). Treatment for this condition will depend on the severity of your condition. Treatment may include:  · Observation and yearly exams. This may be the only treatment needed if your condition and symptoms are mild.  · Medicines to relieve your symptoms, including:  ? Medicines to shrink the prostate.  ? Medicines to relax the muscle of the prostate.  · Surgery in severe cases. Surgery may include:  ? Prostatectomy. In this procedure, the prostate tissue is removed completely through an open incision or with a laparoscope or robotics.  ? Transurethral resection of the prostate (TURP). In this procedure, a tool is inserted through the opening at the tip of the penis (urethra). It is used to cut away tissue of the inner core of the prostate. The pieces are removed through the same opening of the penis. This removes the blockage.  ? Transurethral incision (TUIP). In this procedure, small cuts are made in the prostate. This lessens the prostate's pressure on the urethra.  ? Transurethral microwave thermotherapy (TUMT). This procedure uses microwaves to create  heat. The heat destroys and removes a small amount of prostate tissue.  ? Transurethral needle ablation (TUNA). This procedure uses radio frequencies to destroy and remove a small amount of prostate tissue.  ? Interstitial laser coagulation (ILC). This procedure uses a laser to destroy and remove a small amount of prostate tissue.  ? Transurethral electrovaporization (TUVP). This procedure uses electrodes to destroy and remove a small amount of prostate tissue.  ? Prostatic urethral lift. This procedure inserts an implant to push the lobes of the prostate away from the urethra.  Follow these instructions at home:  · Take over-the-counter and prescription medicines only as told by your health care provider.  · Monitor your symptoms for any changes. Contact your health care provider with any changes.  · Avoid drinking large amounts of liquid before going to bed or out in public.  · Avoid or reduce how much caffeine or alcohol you drink.  · Give yourself time when you urinate.  · Keep all follow-up visits as told by your health care provider. This is important.  Contact a health care provider if:  · You have unexplained back pain.  · Your symptoms do not get better with treatment.  · You develop side effects from the medicine you are taking.  · Your urine becomes very dark or has a bad smell.  · Your lower abdomen becomes distended and you have trouble passing your urine.  Get help right away if:  · You have a fever or chills.  · You suddenly cannot urinate.  · You feel lightheaded, or very dizzy, or you faint.  · There are large amounts of blood or clots in the urine.  · Your urinary problems become hard to manage.  · You develop moderate to severe low back or flank pain. The flank is the side of your body between the ribs and the hip.  These symptoms may represent a serious problem that is an emergency. Do not wait to see if the symptoms will go away. Get medical help right away. Call your local emergency services  (911 in the [place] Do not drive yourself to the hospital.  Summary  · Benign prostatic hyperplasia (BPH) is an enlarged prostate that is caused by the normal aging process and not by cancer.  · An enlarged prostate can press on the urethra. This can make it hard to pass urine.  · This condition is part of a normal aging process and is more likely to develop in men over the age of 50 years.  · Get help right away if you suddenly cannot urinate.  This information is not intended to replace advice given to you by your health care provider. Make sure you discuss any questions you have with your health care provider.  Document Revised: 11/12/2019 Document Reviewed: 01/22/2018  [employer] Patient Education © 2021 [employer]

## 2021-07-23 ENCOUNTER — TELEPHONE (OUTPATIENT)
Dept: UROLOGY | Facility: CLINIC | Age: 51
End: 2021-07-23

## 2021-07-23 LAB
BACTERIA UR CULT: NO GROWTH
BACTERIA UR CULT: NORMAL

## 2021-07-23 NOTE — TELEPHONE ENCOUNTER
Notified pt of his neg cx - he states that he is still having the bloating sensation and pain as he was the day of his appt.

## 2021-07-23 NOTE — TELEPHONE ENCOUNTER
----- Message from Griselda Cheng-Akwa, APRN sent at 7/23/2021  2:56 PM EDT -----  Please call patient negative urine culture results, follow-up as discussed thank you  ----- Message -----  From: Kerry Cook MA  Sent: 7/21/2021   1:03 PM EDT  To: Griselda Cheng-Akwa, APRN

## 2021-07-26 ENCOUNTER — TELEPHONE (OUTPATIENT)
Dept: UROLOGY | Facility: CLINIC | Age: 51
End: 2021-07-26

## 2021-07-26 NOTE — TELEPHONE ENCOUNTER
Pt states that he is having a reaction to the Tamsulosin - rash and hives, had to take benadryl in order to get the reaction to subside. Told pt to discontinue the med and explained that I would speak to Reji and see what her recommendation is.

## 2021-07-27 PROBLEM — R19.7 DIARRHEA: Status: ACTIVE | Noted: 2021-07-27

## 2021-07-27 PROBLEM — K59.04 CHRONIC IDIOPATHIC CONSTIPATION: Status: ACTIVE | Noted: 2021-07-27

## 2021-07-30 ENCOUNTER — LAB (OUTPATIENT)
Dept: LAB | Facility: HOSPITAL | Age: 51
End: 2021-07-30

## 2021-07-30 DIAGNOSIS — Z01.818 PREOPERATIVE CLEARANCE: Primary | ICD-10-CM

## 2021-07-30 LAB — SARS-COV-2 RNA NOSE QL NAA+PROBE: NOT DETECTED

## 2021-07-30 PROCEDURE — C9803 HOPD COVID-19 SPEC COLLECT: HCPCS

## 2021-07-30 PROCEDURE — U0004 COV-19 TEST NON-CDC HGH THRU: HCPCS | Performed by: PHYSICIAN ASSISTANT

## 2021-07-30 NOTE — TELEPHONE ENCOUNTER
Per Reji - Okay to stop the medication.  Thank you NAHUM, his allergies are quite a lot thank you     Tried to notify pt - no answer

## 2021-08-02 ENCOUNTER — HOSPITAL ENCOUNTER (OUTPATIENT)
Facility: HOSPITAL | Age: 51
Setting detail: HOSPITAL OUTPATIENT SURGERY
Discharge: HOME | End: 2021-08-02
Attending: INTERNAL MEDICINE | Admitting: INTERNAL MEDICINE

## 2021-08-02 ENCOUNTER — ANESTHESIA (OUTPATIENT)
Dept: PERIOP | Facility: HOSPITAL | Age: 51
End: 2021-08-02

## 2021-08-02 ENCOUNTER — ANESTHESIA EVENT (OUTPATIENT)
Dept: PERIOP | Facility: HOSPITAL | Age: 51
End: 2021-08-02

## 2021-08-02 VITALS
HEIGHT: 67 IN | TEMPERATURE: 97 F | BODY MASS INDEX: 37.35 KG/M2 | SYSTOLIC BLOOD PRESSURE: 111 MMHG | HEART RATE: 67 BPM | OXYGEN SATURATION: 98 % | WEIGHT: 238 LBS | DIASTOLIC BLOOD PRESSURE: 63 MMHG | RESPIRATION RATE: 20 BRPM

## 2021-08-02 DIAGNOSIS — Z86.010 HISTORY OF COLON POLYPS: ICD-10-CM

## 2021-08-02 DIAGNOSIS — R14.0 BLOATING: ICD-10-CM

## 2021-08-02 DIAGNOSIS — R10.31 RIGHT LOWER QUADRANT ABDOMINAL PAIN: ICD-10-CM

## 2021-08-02 DIAGNOSIS — R19.7 DIARRHEA, UNSPECIFIED TYPE: ICD-10-CM

## 2021-08-02 DIAGNOSIS — R10.84 GENERALIZED ABDOMINAL PAIN: ICD-10-CM

## 2021-08-02 DIAGNOSIS — R34 DECREASED URINE OUTPUT: ICD-10-CM

## 2021-08-02 DIAGNOSIS — R10.2 SUPRAPUBIC PAIN: ICD-10-CM

## 2021-08-02 DIAGNOSIS — K59.04 CHRONIC IDIOPATHIC CONSTIPATION: ICD-10-CM

## 2021-08-02 DIAGNOSIS — K21.9 GASTROESOPHAGEAL REFLUX DISEASE WITHOUT ESOPHAGITIS: ICD-10-CM

## 2021-08-02 PROCEDURE — 43249 ESOPH EGD DILATION <30 MM: CPT | Performed by: INTERNAL MEDICINE

## 2021-08-02 PROCEDURE — 25010000002 PROPOFOL 10 MG/ML EMULSION: Performed by: NURSE ANESTHETIST, CERTIFIED REGISTERED

## 2021-08-02 PROCEDURE — 45385 COLONOSCOPY W/LESION REMOVAL: CPT | Performed by: INTERNAL MEDICINE

## 2021-08-02 PROCEDURE — 43239 EGD BIOPSY SINGLE/MULTIPLE: CPT | Performed by: INTERNAL MEDICINE

## 2021-08-02 PROCEDURE — 25010000002 MIDAZOLAM PER 1 MG: Performed by: ANESTHESIOLOGY

## 2021-08-02 PROCEDURE — 25010000002 FENTANYL CITRATE (PF) 50 MCG/ML SOLUTION: Performed by: NURSE ANESTHETIST, CERTIFIED REGISTERED

## 2021-08-02 RX ORDER — ONDANSETRON 2 MG/ML
4 INJECTION INTRAMUSCULAR; INTRAVENOUS AS NEEDED
Status: CANCELLED | OUTPATIENT
Start: 2021-08-02

## 2021-08-02 RX ORDER — IPRATROPIUM BROMIDE AND ALBUTEROL SULFATE 2.5; .5 MG/3ML; MG/3ML
3 SOLUTION RESPIRATORY (INHALATION) ONCE AS NEEDED
Status: CANCELLED | OUTPATIENT
Start: 2021-08-02

## 2021-08-02 RX ORDER — SODIUM CHLORIDE, SODIUM LACTATE, POTASSIUM CHLORIDE, CALCIUM CHLORIDE 600; 310; 30; 20 MG/100ML; MG/100ML; MG/100ML; MG/100ML
125 INJECTION, SOLUTION INTRAVENOUS ONCE
Status: COMPLETED | OUTPATIENT
Start: 2021-08-02 | End: 2021-08-02

## 2021-08-02 RX ORDER — PROPOFOL 10 MG/ML
VIAL (ML) INTRAVENOUS CONTINUOUS PRN
Status: DISCONTINUED | OUTPATIENT
Start: 2021-08-02 | End: 2021-08-02 | Stop reason: SURG

## 2021-08-02 RX ORDER — FENTANYL CITRATE 50 UG/ML
50 INJECTION, SOLUTION INTRAMUSCULAR; INTRAVENOUS
Status: CANCELLED | OUTPATIENT
Start: 2021-08-02

## 2021-08-02 RX ORDER — MIDAZOLAM HYDROCHLORIDE 1 MG/ML
1 INJECTION INTRAMUSCULAR; INTRAVENOUS
Status: DISCONTINUED | OUTPATIENT
Start: 2021-08-02 | End: 2021-08-02 | Stop reason: HOSPADM

## 2021-08-02 RX ORDER — FENTANYL CITRATE 50 UG/ML
INJECTION, SOLUTION INTRAMUSCULAR; INTRAVENOUS AS NEEDED
Status: DISCONTINUED | OUTPATIENT
Start: 2021-08-02 | End: 2021-08-02 | Stop reason: SURG

## 2021-08-02 RX ORDER — SODIUM CHLORIDE 0.9 % (FLUSH) 0.9 %
10 SYRINGE (ML) INJECTION AS NEEDED
Status: DISCONTINUED | OUTPATIENT
Start: 2021-08-02 | End: 2021-08-02 | Stop reason: HOSPADM

## 2021-08-02 RX ORDER — SODIUM CHLORIDE 0.9 % (FLUSH) 0.9 %
10 SYRINGE (ML) INJECTION EVERY 12 HOURS SCHEDULED
Status: DISCONTINUED | OUTPATIENT
Start: 2021-08-02 | End: 2021-08-02 | Stop reason: HOSPADM

## 2021-08-02 RX ORDER — MEPERIDINE HYDROCHLORIDE 25 MG/ML
12.5 INJECTION INTRAMUSCULAR; INTRAVENOUS; SUBCUTANEOUS
Status: CANCELLED | OUTPATIENT
Start: 2021-08-02 | End: 2021-08-03

## 2021-08-02 RX ADMIN — MIDAZOLAM HYDROCHLORIDE 2 MG: 1 INJECTION, SOLUTION INTRAMUSCULAR; INTRAVENOUS at 08:13

## 2021-08-02 RX ADMIN — PROPOFOL 150 MCG/KG/MIN: 10 INJECTION, EMULSION INTRAVENOUS at 08:16

## 2021-08-02 RX ADMIN — SODIUM CHLORIDE, POTASSIUM CHLORIDE, SODIUM LACTATE AND CALCIUM CHLORIDE: 600; 310; 30; 20 INJECTION, SOLUTION INTRAVENOUS at 08:13

## 2021-08-02 RX ADMIN — FENTANYL CITRATE 100 MCG: 50 INJECTION INTRAMUSCULAR; INTRAVENOUS at 08:13

## 2021-08-02 NOTE — OP NOTE
ESOPHAGOGASTRODUODENOSCOPY PROCEDURE REPORT    Jaquan Mckay  8/2/2021    GASTROENTEROLOGIST:  Irving Azar MD    PRE-PROCEDURE DIAGNOSIS/INDICATION:  Chronic GERD  Intermittent dysphagia to solids  Chronic dyspepsia    POST-PROCEDURE DIAGNOSIS/ FINDINGS:  1.-Esophagus: Patchy salmon-colored mucosa in the distal 1 cm esophagus.  Biopsied to rule out short segment Castillo's esophagus.  Esophageal dilation to 20 mm performed with a through-the-scope balloon across upper and lower esophageal sphincters, proximal/distal esophagus  2.-Stomach: Normal.  Antral biopsies taken to rule out H. pylori  3.-Duodenum: Normal.  Biopsied to rule out celiac disease      ANESTHESIA:  Propofol administered by anesthesia.  See anesthesia notes for ASA classification      OPERATIVE PROCEDURE:  After proper informed consent was obtained, patient was transferred to the OR/endoscopy suite.  Patient was then placed in left lateral decubitus position. The Olympus 180 series video gastroscope was inserted orally under direct visualization.  Esophagus, stomach, and duodenum were inspected.  The endoscope was passed to the third portion of the duodenum.  Scope was retroflexed for visualization of the cardia and incisuraThe endoscope was then withdrawn. Patient tolerated the procedure well. There were no immediate complications.    ESTIMATED BLOOD LOSS:  None    SPECIMENS:  Esophageal biopsies pending rule out Castillo's esophagus  Gastric biopsies pending to rule out H. pylori  Duodenal biopsies pending to rule out celiac disease               COMPLICATIONS;  None    RECOMMENDATIONS/ PLAN:  1.-Await pathology report  2.-Surveillance EGD if Castillo's esophagus confirmed  3.-GI clinic follow-up  4.-Proceed with colonoscopy    Irving Azar MD     08/02/21 08:30 EDT

## 2021-08-02 NOTE — OP NOTE
COLONOSCOPY PROCEDURE NOTE    Jaquan Mckay  8/2/2021    GASTROENTEROLOGIST:  Irving Azar MD    PRE-PROCEDURE DIAGNOSIS/ INDICATION:   History of adenomatous colonic polyps    POST-PROCEDURE DIAGNOSIS/ FINDINGS:  1.-Normal terminal ileum  2.-Diverticulosis of the right and left colon  3.-Status post cold snare polypectomy of 4 mm proximal transverse colon polyp.  Pathology pending    PROCEDURE:  COLONOSCOPY with cold snare polypectomy    ANESTHESIA:  Propofol administered by anesthesia.  See anesthesia notes for ASA classification      OPERATIVE PROCEDURE   After proper informed consent was obtained, the patient was taken the operating suite and placed in left lateral decubitus position.  An Olympus video colonoscope 180 series was inserted in the rectum and advanced to the terminal ileum under direct visualization.  Cecum and terminal ileum were identified by visualization of the appendiceal orifice and ileocecal valve.  The colonoscope was then slowly withdrawn from the cecum to the rectum and passed a second time from rectum to cecum.  The colonoscope was retroflexed in the cecum and rectum. Scope was then withdrawn. Patient tolerated the procedure well. There were no immediate complications. Cecal withdrawal time was 13 minutes.    ESTIMATED BLOOD LOSS  None    SPECIMENS  Transverse colon polyp, rule out adenoma             COMPLICATIONS  None    RECOMMENDATIONS:  1.-Await pathology report  2.-Repeat colonoscopy in 5 years if polyp was adenomatous    Irving Azar MD  08/02/21 08:34 EDT

## 2021-08-02 NOTE — H&P
"GASTROENTEROLOGY OFFICE NOTE  Jaquan Mckay  [id number]  1970      Chief complaint  Dyspepsia/GERD    HISTORY OF PRESENT ILLNESS:  Patient presents today for upper endoscopy to evaluate chronic gastroesophageal reflux and dyspeptic symptoms.  He was recently seen in the GI clinic on 6/28/2021 with complaints of abdominal cramping, early satiety, intermittent problems with diarrhea and recurrent problems with nausea.      He has intermittent dysphagia to solids heavy chew carefully and drink a lot of water water with his meals to avoid food impactions.    He states his last colonoscopy 5 years ago was remarkable for a large number of polyps.    PAST MEDICAL HISTORY  Past Medical History:   Diagnosis Date   • Allergic    • Anxiety    • Arthritis    • Asthma    • Body piercing     REPORTS CYLICONE IN EARS   • Clotting disorder (CMS/HCC) 2004    had a knee surgery   • Coronary artery disease    • Depression    • DVT (deep venous thrombosis) (CMS/HCC)     RIGHT RIGHT KNEE AFTER SURGERY YEARS AGO IN 2001 OR 2004   • Elevated cholesterol    • Gastric ulcer    • GERD (gastroesophageal reflux disease)    • H/O migraine    • Headache    • Heart attack (CMS/HCC)     REPORTS \"LIGHT HEART ATTACK A LONG TIME AGO\"  \"EARLY 90'S\"   • History of seizures     REPORTS LAST EPISODE WAS AROUND 1995.   • Hostility    • Hyperlipidemia    • Hypertension    • Knee pain, acute     Left   • Low back pain    • Lyme disease    • Migraine    • MRSA (methicillin resistant Staphylococcus aureus)     REPORTS LAST TESTED + 2004. WAS TREATED HE REPORTS.  RIGHT ARM, RIGHT KNEE.   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    • Obesity    • Poor historian    • [place] spotted fever    • Seizures (CMS/HCC)    • Sleep apnea    • Tattoo    • Wears glasses         PAST SURGICAL HISTORY  Past Surgical History:   Procedure Laterality Date   • ABDOMINAL SURGERY     • BACK SURGERY     • BRAIN SURGERY  1986    Tumor removal    • CARDIAC CATHETERIZATION N/A 9/28/2018    " "Procedure: Left Heart Cath;  Surgeon: Leandro Daily MD;  Location: [facility] CATH INVASIVE LOCATION;  Service: Cardiology   • CHOLECYSTECTOMY     • COLONOSCOPY     • CYST REMOVAL      pilonidal cyst   • ELBOW EPICONDYLECTOMY Right 7/23/2020    Procedure: LATERAL EPICONDYLAR RELEASE;  Surgeon: Jose Ruiz MD;  Location: [facility] OR;  Service: Orthopedics;  Laterality: Right;   • ENDOSCOPY     • FRACTURE SURGERY Right     elbow   • KNEE ARTHROSCOPY Left 10/20/2017    Procedure: Diagnostic arthroscopy left knee with chondroplasty;  Surgeon: Marco Aguirre MD;  Location: [facility] OR;  Service:    • KNEE ARTHROSCOPY Left 1/11/2021    Procedure: KNEE DIAGNOSTIC ARTHROSCOPY WITH  CHONDROPLASTY patella, femoral and medial;  Surgeon: Raul Eagle MD;  Location: [facility] OR;  Service: Orthopedics;  Laterality: Left;   • KNEE SURGERY Right    • MOUTH SURGERY      FULL MOUTH EXTRACTION   • OTHER SURGICAL HISTORY      REPORTS 7 TICKS REMOVED FROM RIGHT ARM IN 2001 OR 2002   • TENNIS ELBOW RELEASE Right 7/23/2020    Procedure: RIGHT TENNIS ELBOW RELEASE;  Surgeon: Jose Ruiz MD;  Location: [facility] OR;  Service: Orthopedics;  Laterality: Right;   • TUMOR EXCISION      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        MEDICATIONS:    Current Facility-Administered Medications:   •  lactated ringers infusion, 125 mL/hr, Intravenous, Once, Adam Burks MD  •  midazolam (VERSED) injection 1 mg, 1 mg, Intravenous, Q10 Min PRN, Adam Burks MD  •  sodium chloride 0.9 % flush 10 mL, 10 mL, Intravenous, Q12H, Adam Burks MD  •  sodium chloride 0.9 % flush 10 mL, 10 mL, Intravenous, PRN, Adam Burks MD    ALLERGIES  Allergies   Allergen Reactions   • Ciprofloxacin Anaphylaxis and Hives   • Mobic [Meloxicam] Other (See Comments)     Pt states, \"It make my feet and hands go numb and I can't hardly walk.\"    • Paxil [Paroxetine Hcl] Shortness Of Breath     Chest pain    • Peanut-Containing Drug Products Anaphylaxis "   • Penicillins Anaphylaxis   • Pristiq [Desvenlafaxine Succinate Er] Dizziness   • Sulfa Antibiotics Anaphylaxis, Itching and Rash   • Doxycycline Hives   • Fish-Derived Products Hives   • Isosorbide Nitrate Rash     Rash, hives, had to use inhaler.    • Movantik [Naloxegol] Rash   • Buspar [Buspirone] Rash   • Clarithromycin Rash   • Clindamycin/Lincomycin Rash   • Codeine Rash   • Contrast Dye Itching and Rash   • Diltiazem Rash   • Flomax [Tamsulosin] Hives   • Gabapentin Rash   • Keflex [Cephalexin] Rash   • Linzess [Linaclotide] Rash   • Metoprolol Rash   • Prednisone Rash and Other (See Comments)     Face, feet, and legs go completely numb per patient   • Robitussin Cough+ Chest Max St [Dextromethorphan-Guaifenesin] Itching   • Shrimp (Diagnostic) Rash   • Spironolactone Rash   • Viibryd [Vilazodone Hcl] Itching and Rash   • Zoloft [Sertraline Hcl] Hives and Itching       FAMILY HISTORY:  Family History   Problem Relation Age of Onset   • Diabetes Mother    • Hypertension Mother    • Stroke Mother    • Diabetes Father    • Skin cancer Father    • Hypertension Father    • Heart attack Father    • Diabetes Brother    • Hypertension Brother    • Heart disease Maternal Aunt    • Heart disease Maternal Uncle    • Heart disease Paternal Aunt    • Heart disease Paternal Uncle    • Heart disease Maternal Grandmother    • Heart disease Maternal Grandfather    • Heart disease Paternal Grandmother    • Heart disease Paternal Grandfather        SOCIAL HISTORY  Social History     Socioeconomic History   • Marital status:      Spouse name: Becca   • Number of children: 2   • Years of education: 12   • Highest education level: Not on file   Tobacco Use   • Smoking status: Current Every Day Smoker     Packs/day: 1.50     Years: 17.00     Pack years: 25.50     Types: Cigars, Cigarettes     Start date: 5/5/2010   • Smokeless tobacco: Never Used   • Tobacco comment: still uses 1.5 packs a day.  he has not smoked in  "approx 3 weeks.   Vaping Use   • Vaping Use: Never used   Substance and Sexual Activity   • Alcohol use: No   • Drug use: No   • Sexual activity: Defer     Partners: Female     Birth control/protection: None     PHYSICAL EXAM   /88 (BP Location: Left arm, Patient Position: Lying)   Pulse 85   Temp 98.2 °F (36.8 °C) (Temporal)   Resp 18   Ht 170.2 cm (67\")   Wt 108 kg (238 lb)   SpO2 99%   BMI 37.28 kg/m²   General: Alert and oriented x 3. In no apparent or acute distress.  and No stigmata of chronic liver disease  HEENT: Anicteric sclerae. Normal oropharynx  Neck: Supple. Without lymphadenopathy  CV: Regular rate and rhythm, S1, S2  Lungs: Clear to ausculation. Without rales, rhonchi and wheezing  Abdomen:  Soft,non-distended without palpable masses or hepatosplenomeagaly, areas of rebound tenderness or guarding.   Extremeties: without clubbing, cyanosis or edema  Neurologic:  Alert and oriented x 3 without focal motor or sensory deficits  Rectal exam: deferred         ASSESSMENT  1.-Chronic GERD/dyspepsia with early satiety.  Rule out gastroparesis, erosive esophagitis, gastric outlet obstruction, rule out celiac disease (also complains of diarrhea)  2.-Intermittent dysphagia solids  3.-History of adenomatous colonic polyps    PLAN  1.-Proceed with EGD with further work-up and recommendations deferred pending findings of today's endoscopy  2.-Proceed with surveillance colonoscopy    Irving Azar MD  8/2/2021   07:58 EDT            "

## 2021-08-02 NOTE — ANESTHESIA POSTPROCEDURE EVALUATION
Patient: Jaquan Mckay    Procedure Summary     Date: 08/02/21 Room / Location: [facility] OR [address] [place] [address] COR OR    Anesthesia Start: 0814 Anesthesia Stop: 0853    Procedures:       ESOPHAGOGASTRODUODENOSCOPY WITH BIOPSY (N/A Esophagus)      COLONOSCOPY FOR SCREENING (N/A ) Diagnosis:       Chronic idiopathic constipation      Gastroesophageal reflux disease without esophagitis      Bloating      History of colon polyps      Diarrhea, unspecified type      Generalized abdominal pain      (Chronic idiopathic constipation [K59.04])      (Gastroesophageal reflux disease without esophagitis [K21.9])      (Bloating [R14.0])      (History of colon polyps [Z86.010])      (Diarrhea, unspecified type [R19.7])      (Generalized abdominal pain [R10.84])    Surgeons: Irving Azar MD Provider: Adam Burks MD    Anesthesia Type: general ASA Status: 3          Anesthesia Type: general    Vitals  Vitals Value Taken Time   /66 08/02/21 0905   Temp 97 °F (36.1 °C) 08/02/21 0855   Pulse 63 08/02/21 0905   Resp 20 08/02/21 0905   SpO2 100 % 08/02/21 0905           Post Anesthesia Care and Evaluation    Patient location during evaluation: bedside  Patient participation: complete - patient participated  Level of consciousness: awake and alert  Pain score: 1  Pain management: adequate  Airway patency: patent  Anesthetic complications: No anesthetic complications  PONV Status: none  Cardiovascular status: acceptable  Respiratory status: acceptable  Hydration status: acceptable

## 2021-08-03 LAB — LAB AP CASE REPORT: NORMAL

## 2021-08-03 RX ORDER — HYDROCODONE BITARTRATE AND ACETAMINOPHEN 5; 325 MG/1; MG/1
1 TABLET ORAL EVERY 8 HOURS PRN
Qty: 9 TABLET | Refills: 0 | Status: SHIPPED | OUTPATIENT
Start: 2021-08-03 | End: 2021-09-21 | Stop reason: SDUPTHER

## 2021-08-09 ENCOUNTER — OFFICE VISIT (OUTPATIENT)
Dept: GASTROENTEROLOGY | Facility: CLINIC | Age: 51
End: 2021-08-09

## 2021-08-09 VITALS
OXYGEN SATURATION: 99 % | BODY MASS INDEX: 36.91 KG/M2 | DIASTOLIC BLOOD PRESSURE: 73 MMHG | HEART RATE: 74 BPM | HEIGHT: 67 IN | WEIGHT: 235.2 LBS | SYSTOLIC BLOOD PRESSURE: 117 MMHG

## 2021-08-09 DIAGNOSIS — K59.04 CHRONIC IDIOPATHIC CONSTIPATION: ICD-10-CM

## 2021-08-09 DIAGNOSIS — R10.33 PERIUMBILICAL ABDOMINAL PAIN: Primary | ICD-10-CM

## 2021-08-09 PROCEDURE — 99213 OFFICE O/P EST LOW 20 MIN: CPT | Performed by: PHYSICIAN ASSISTANT

## 2021-08-09 RX ORDER — LUBIPROSTONE 8 UG/1
8 CAPSULE ORAL 2 TIMES DAILY WITH MEALS
Qty: 30 CAPSULE | Refills: 11 | Status: SHIPPED | OUTPATIENT
Start: 2021-08-09 | End: 2022-02-03

## 2021-08-09 RX ORDER — CALCIUM POLYCARBOPHIL 625 MG
625 TABLET ORAL 2 TIMES DAILY
Qty: 60 TABLET | Refills: 11 | Status: SHIPPED | OUTPATIENT
Start: 2021-08-09 | End: 2022-02-03

## 2021-08-09 NOTE — PROGRESS NOTES
Chief Complaint   Patient presents with   • Constipation       Jaquan Mckay is a 50 y.o. male who presents to the office today for evaluation of Constipation  .    HPI  Patient presents the clinic today for chronic constipation.  He has currently been taking Amitiza 24 mcg twice daily.  He states that the medication is working well and he has not had a reaction to it.  His only complaint is the 24 mcg is causing some diarrhea-she will have 6-7 episodes daily on it.    He is still having recurrent abdominal pain in the periumbilical area.  He states years ago he had his gallbladder removed and they cut him below his bellybutton due to having some gangrene.  He states the pain is constant and is like a pulling sensation.  The pain has not gotten better since having more regular bowel movements.  He has been told by several providers that more than likely this is due to scar tissue.  He has had previous CT scans done that have not revealed any hernias.    Review of Systems   Constitutional: Negative.    HENT: Negative for sore throat.    Eyes: Negative.    Respiratory: Negative for chest tightness.    Cardiovascular: Negative for chest pain.   Gastrointestinal: Positive for abdominal distention, abdominal pain, constipation, diarrhea, nausea and rectal pain. Negative for anal bleeding, blood in stool and vomiting.   Endocrine: Negative.    Genitourinary: Positive for difficulty urinating.   Musculoskeletal: Positive for back pain.   Skin: Negative.    Allergic/Immunologic: Positive for environmental allergies and food allergies.   Neurological: Positive for dizziness and headaches.   Hematological: Bruises/bleeds easily.   Psychiatric/Behavioral: Positive for sleep disturbance. The patient is nervous/anxious.        ACTIVE PROBLEMS:   Specialty Problems        Gastroenterology Problems    Gastroesophageal reflux disease without esophagitis        Rectal bleeding        Therapeutic opioid-induced constipation (OIC)      "   Chronic idiopathic constipation              PAST MEDICAL HISTORY:  Past Medical History:   Diagnosis Date   • Allergic    • Anxiety    • Arthritis    • Asthma    • Body piercing     REPORTS CYLICONE IN EARS   • Clotting disorder (CMS/HCC) 2004    had a knee surgery   • Coronary artery disease    • Depression    • DVT (deep venous thrombosis) (CMS/HCC)     RIGHT RIGHT KNEE AFTER SURGERY YEARS AGO IN 2001 OR 2004   • Elevated cholesterol    • Gastric ulcer    • GERD (gastroesophageal reflux disease)    • H/O migraine    • Headache    • Heart attack (CMS/HCC)     REPORTS \"LIGHT HEART ATTACK A LONG TIME AGO\"  \"EARLY 90'S\"   • History of seizures     REPORTS LAST EPISODE WAS AROUND 1995.   • Hostility    • Hyperlipidemia    • Hypertension    • Knee pain, acute     Left   • Low back pain    • Lyme disease    • Migraine    • MRSA (methicillin resistant Staphylococcus aureus)     REPORTS LAST TESTED + 2004. WAS TREATED HE REPORTS.  RIGHT ARM, RIGHT KNEE.   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    • Obesity    • Poor historian    • [place] spotted fever    • Seizures (CMS/HCC)    • Sleep apnea    • Tattoo    • Wears glasses        SURGICAL HISTORY:  Past Surgical History:   Procedure Laterality Date   • ABDOMINAL SURGERY     • BACK SURGERY     • BRAIN SURGERY  1986    Tumor removal    • CARDIAC CATHETERIZATION N/A 9/28/2018    Procedure: Left Heart Cath;  Surgeon: Leandro Daily MD;  Location: [facility] CATH INVASIVE LOCATION;  Service: Cardiology   • CHOLECYSTECTOMY     • COLONOSCOPY     • COLONOSCOPY N/A 8/2/2021    Procedure: COLONOSCOPY FOR SCREENING;  Surgeon: Irving Azar MD;  Location: [facility];  Service: Gastroenterology;  Laterality: N/A;   • CYST REMOVAL      pilonidal cyst   • ELBOW EPICONDYLECTOMY Right 7/23/2020    Procedure: LATERAL EPICONDYLAR RELEASE;  Surgeon: Jose Ruiz MD;  Location: [facility];  Service: Orthopedics;  Laterality: Right;   • ENDOSCOPY     • ENDOSCOPY N/A 8/2/2021    " Procedure: ESOPHAGOGASTRODUODENOSCOPY WITH BIOPSY;  Surgeon: Irving Azar MD;  Location: [facility] OR;  Service: Gastroenterology;  Laterality: N/A;  esophageal dilatation to 20mm   • FRACTURE SURGERY Right     elbow   • KNEE ARTHROSCOPY Left 10/20/2017    Procedure: Diagnostic arthroscopy left knee with chondroplasty;  Surgeon: Marco Aguirre MD;  Location: [facility] OR;  Service:    • KNEE ARTHROSCOPY Left 1/11/2021    Procedure: KNEE DIAGNOSTIC ARTHROSCOPY WITH  CHONDROPLASTY patella, femoral and medial;  Surgeon: Raul Eagle MD;  Location: [facility] OR;  Service: Orthopedics;  Laterality: Left;   • KNEE SURGERY Right    • MOUTH SURGERY      FULL MOUTH EXTRACTION   • OTHER SURGICAL HISTORY      REPORTS 7 TICKS REMOVED FROM RIGHT ARM IN 2001 OR 2002   • TENNIS ELBOW RELEASE Right 7/23/2020    Procedure: RIGHT TENNIS ELBOW RELEASE;  Surgeon: Jose Ruiz MD;  Location: [facility] OR;  Service: Orthopedics;  Laterality: Right;   • TUMOR EXCISION      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       FAMILY HISTORY:  Family History   Problem Relation Age of Onset   • Diabetes Mother    • Hypertension Mother    • Stroke Mother    • Diabetes Father    • Skin cancer Father    • Hypertension Father    • Heart attack Father    • Diabetes Brother    • Hypertension Brother    • Heart disease Maternal Aunt    • Heart disease Maternal Uncle    • Heart disease Paternal Aunt    • Heart disease Paternal Uncle    • Heart disease Maternal Grandmother    • Heart disease Maternal Grandfather    • Heart disease Paternal Grandmother    • Heart disease Paternal Grandfather        SOCIAL HISTORY:  Social History     Tobacco Use   • Smoking status: Current Every Day Smoker     Packs/day: 1.50     Years: 17.00     Pack years: 25.50     Types: Cigars, Cigarettes     Start date: 5/5/2010   • Smokeless tobacco: Never Used   • Tobacco comment: still uses 1.5 packs a day.  he has not smoked in approx 3 weeks.   Substance  Use Topics   • Alcohol use: No       CURRENT MEDICATION:    Current Outpatient Medications:   •  albuterol (PROVENTIL) (2.5 MG/3ML) 0.083% nebulizer solution, Take 2.5 mg by nebulization Every 4 (Four) Hours As Needed for Shortness of Air., Disp: 180 vial, Rfl: 5  •  albuterol sulfate  (90 Base) MCG/ACT inhaler, Inhale 2 puffs Every 4 (Four) Hours As Needed for Wheezing., Disp: 18 g, Rfl: 5  •  aspirin (Aspirin Adult Low Strength) 81 MG EC tablet, Take 1 tablet by mouth Daily., Disp: 30 tablet, Rfl: 3  •  atorvastatin (LIPITOR) 40 MG tablet, Take 1 tablet by mouth Daily., Disp: 90 tablet, Rfl: 3  •  dexlansoprazole (Dexilant) 60 MG capsule, Take 1 capsule by mouth Daily., Disp: 30 capsule, Rfl: 5  •  Dilantin 100 MG ER capsule, Take 2 capsules by mouth 2 (Two) Times a Day., Disp: 120 capsule, Rfl: 5  •  diphenhydrAMINE (Benadryl Allergy) 25 MG tablet, Take 1 tablet by mouth Every 6 (Six) Hours As Needed for Itching or Allergies., Disp: 30 tablet, Rfl: 1  •  HYDROcodone-acetaminophen (NORCO) 5-325 MG per tablet, Take 1 tablet by mouth Every 8 (Eight) Hours As Needed for Moderate Pain ., Disp: 9 tablet, Rfl: 0  •  lisinopril (PRINIVIL,ZESTRIL) 30 MG tablet, Take 1 tablet by mouth Daily., Disp: 30 tablet, Rfl: 5  •  loratadine (CLARITIN) 10 MG tablet, Take 1 tablet by mouth Daily As Needed for Allergies., Disp: 30 tablet, Rfl: 5  •  methocarbamol (ROBAXIN) 750 MG tablet, Take 1 tablet by mouth 2 (Two) Times a Day As Needed for Muscle Spasms., Disp: 60 tablet, Rfl: 5  •  mirtazapine (REMERON) 30 MG tablet, Take 1.5 tablets by mouth Every Night. 1 tablet 2-3 hours before bedtime, Disp: 45 tablet, Rfl: 5  •  mupirocin (BACTROBAN) 2 % ointment, Apply  topically to the appropriate area as directed 3 (Three) Times a Day., Disp: 30 g, Rfl: 0  •  ondansetron (Zofran) 4 MG tablet, Take 1 tablet by mouth Every 8 (Eight) Hours As Needed for Nausea., Disp: 20 tablet, Rfl: 0  •  phenazopyridine (Pyridium) 200 MG tablet, Take  "1 tablet by mouth 3 (Three) Times a Day As Needed for Bladder Spasms., Disp: 30 tablet, Rfl: 0  •  potassium chloride 10 MEQ CR tablet, Take 1 tablet by mouth Daily., Disp: 30 tablet, Rfl: 5  •  vitamin D (ERGOCALCIFEROL) 1.25 MG (64867 UT) capsule capsule, Take 1 capsule by mouth Every 7 (Seven) Days. On Friday, Disp: 5 capsule, Rfl: 5  •  lubiprostone (Amitiza) 8 MCG capsule, Take 1 capsule by mouth 2 (Two) Times a Day With Meals., Disp: 30 capsule, Rfl: 11  •  magnesium citrate 1.745 GM/30ML solution solution, Take 296 mL by mouth Take As Directed for 2 doses. Take one full bottle at 4:00 PM and take second bottle at 10:00 PM., Disp: 592 mL, Rfl: 0  •  Misc. Devices (BATH/SHOWER SEAT) misc, 1 each Daily., Disp: 1 each, Rfl: 0  •  montelukast (SINGULAIR) 10 MG tablet, Take 1 tablet by mouth Every Night., Disp: 30 tablet, Rfl: 5  •  polycarbophil (calcium polycarbophil) 625 MG tablet tablet, Take 1 tablet by mouth 2 (Two) Times a Day., Disp: 60 tablet, Rfl: 11    ALLERGIES:  Ciprofloxacin, Mobic [meloxicam], Paxil [paroxetine hcl], Peanut-containing drug products, Penicillins, Pristiq [desvenlafaxine succinate er], Sulfa antibiotics, Doxycycline, Fish-derived products, Isosorbide nitrate, Movantik [naloxegol], Buspar [buspirone], Clarithromycin, Clindamycin/lincomycin, Codeine, Contrast dye, Diltiazem, Flomax [tamsulosin], Gabapentin, Keflex [cephalexin], Linzess [linaclotide], Metoprolol, Prednisone, Robitussin cough+ chest max st [dextromethorphan-guaifenesin], Shrimp (diagnostic), Spironolactone, Viibryd [vilazodone hcl], and Zoloft [sertraline hcl]    VISIT VITALS:  /73 (BP Location: Right arm, Patient Position: Sitting, Cuff Size: Adult)   Pulse 74   Ht 170.2 cm (67.01\")   Wt 107 kg (235 lb 3.2 oz)   SpO2 99%   BMI 36.83 kg/m²   Physical Exam  Constitutional:       General: He is not in acute distress.     Appearance: Normal appearance. He is well-developed.   HENT:      Head: Normocephalic and " atraumatic.   Eyes:      Pupils: Pupils are equal, round, and reactive to light.   Cardiovascular:      Rate and Rhythm: Normal rate and regular rhythm.      Heart sounds: Normal heart sounds.   Pulmonary:      Effort: Pulmonary effort is normal. No respiratory distress.      Breath sounds: Normal breath sounds. No wheezing, rhonchi or rales.   Abdominal:      General: Abdomen is flat. Bowel sounds are normal. There is no distension.      Palpations: Abdomen is soft. There is no mass.      Tenderness: There is no abdominal tenderness. There is no guarding or rebound.      Hernia: No hernia is present.   Musculoskeletal:         General: No swelling. Normal range of motion.      Cervical back: Normal range of motion and neck supple.      Right lower leg: No edema.      Left lower leg: No edema.   Skin:     General: Skin is warm and dry.   Neurological:      Mental Status: He is alert and oriented to person, place, and time.   Psychiatric:         Attention and Perception: Attention normal.         Mood and Affect: Mood normal.         Speech: Speech normal.         Behavior: Behavior normal. Behavior is cooperative.         Thought Content: Thought content normal.           Assessment/Plan   I will decrease the dose of Amitiza to 8 mcg twice daily as the 24 mcg was causing diarrhea.  I believe his abdominal pain is linked to some scar tissue from a previous surgery-he is asking to meet with general surgery to see if it can be removed or any other options are available.   Diagnosis Plan   1. Periumbilical abdominal pain  Ambulatory Referral to General Surgery   2. Chronic idiopathic constipation  polycarbophil (calcium polycarbophil) 625 MG tablet tablet    lubiprostone (Amitiza) 8 MCG capsule       Return in about 3 months (around 11/9/2021).                   This document has been electronically signed by Marisela Nix PA-C  August 9, 2021 13:46 EDT    Part of this note may be an electronic  transcription/translation of spoken language to printed text using the Dragon Dictation System.

## 2021-08-10 ENCOUNTER — OFFICE VISIT (OUTPATIENT)
Dept: FAMILY MEDICINE CLINIC | Facility: CLINIC | Age: 51
End: 2021-08-10

## 2021-08-10 VITALS
OXYGEN SATURATION: 97 % | HEIGHT: 67 IN | WEIGHT: 230.2 LBS | BODY MASS INDEX: 36.13 KG/M2 | HEART RATE: 90 BPM | TEMPERATURE: 97.5 F | SYSTOLIC BLOOD PRESSURE: 124 MMHG | DIASTOLIC BLOOD PRESSURE: 74 MMHG

## 2021-08-10 DIAGNOSIS — F51.01 PRIMARY INSOMNIA: ICD-10-CM

## 2021-08-10 DIAGNOSIS — G89.29 CHRONIC BILATERAL LOW BACK PAIN WITH LEFT-SIDED SCIATICA: ICD-10-CM

## 2021-08-10 DIAGNOSIS — K59.04 CHRONIC IDIOPATHIC CONSTIPATION: ICD-10-CM

## 2021-08-10 DIAGNOSIS — R56.9 SEIZURES (HCC): ICD-10-CM

## 2021-08-10 DIAGNOSIS — K21.9 GASTROESOPHAGEAL REFLUX DISEASE WITHOUT ESOPHAGITIS: ICD-10-CM

## 2021-08-10 DIAGNOSIS — I10 ESSENTIAL HYPERTENSION: Primary | ICD-10-CM

## 2021-08-10 DIAGNOSIS — E55.9 VITAMIN D DEFICIENCY: ICD-10-CM

## 2021-08-10 DIAGNOSIS — M54.42 CHRONIC BILATERAL LOW BACK PAIN WITH LEFT-SIDED SCIATICA: ICD-10-CM

## 2021-08-10 PROCEDURE — 99214 OFFICE O/P EST MOD 30 MIN: CPT | Performed by: NURSE PRACTITIONER

## 2021-08-10 RX ORDER — MIRTAZAPINE 30 MG/1
45 TABLET, FILM COATED ORAL NIGHTLY
Qty: 45 TABLET | Refills: 5 | Status: SHIPPED | OUTPATIENT
Start: 2021-08-10 | End: 2021-11-18

## 2021-08-10 RX ORDER — PHENYTOIN SODIUM 100 MG/1
200 CAPSULE, EXTENDED RELEASE ORAL 2 TIMES DAILY
Qty: 120 CAPSULE | Refills: 5 | Status: SHIPPED | OUTPATIENT
Start: 2021-08-10 | End: 2022-01-27

## 2021-08-10 RX ORDER — LISINOPRIL 30 MG/1
30 TABLET ORAL DAILY
Qty: 30 TABLET | Refills: 5 | Status: SHIPPED | OUTPATIENT
Start: 2021-08-10 | End: 2022-01-27

## 2021-08-10 RX ORDER — METHOCARBAMOL 750 MG/1
750 TABLET, FILM COATED ORAL 2 TIMES DAILY PRN
Qty: 60 TABLET | Refills: 5 | OUTPATIENT
Start: 2021-08-10 | End: 2021-11-11

## 2021-08-10 NOTE — ASSESSMENT & PLAN NOTE
Continue PPI.  Advised to avoid known GI triggers such as spicy foods.  Upright 30 minutes after meals and avoid eating large meals.  Several small meals daily as able to avoid overfilling stomach.

## 2021-08-10 NOTE — ASSESSMENT & PLAN NOTE
Continue to be active.   Heat/Ice alternating to area of tenderness.  Do not apply directly to skin.  Gentle stretching of area and massage.  Avoid overuse or activities that exacerbate.

## 2021-08-10 NOTE — PROGRESS NOTES
"Subjective   Jaquan Mckay is a 50 y.o. male.     Chief Complaint   Patient presents with   • Hypertension       History of Present Illness     HTN-chronic and ongoing.  He is taking lisinopril 30 mg.  No negative side effects.  He is not been checking consistently.  No CP is reported today.  No palpitations.   GI concern-reports scope showed \"throat was raw\" and \"got stretched\" referring to his throat. Noted to have diverticulosis.  One polyp removed.   He is to continue his PPI.  He has been started on Amitiza 8 mg but 24 mcg was giving him diarrhea \"bad\".  He reports he will also be ordered Metamucil to start.     Decreased urine-reports \"hardly peed today\".   He has been under the care of urology.  He has an appt 08/20/2021.  He was started on \"flomax\" and \"had a bad reaction\"   Reports \"prostate very enlarged\".  He is to follow up with Dr Lee.    Vitamin D deficiency-chronic and ongoing.  Patient is presently taking vitamin D 50,000 units supplement.  No negative side effects of medication are reported.  Vitamin D level is stable with medication use.  Seizure disorder-on Dilantin 2 capsules BID. He has not missed any meds.   He denies any feeling of seizure activity.    Asthma-reports that humid weather has increased his symptoms.  He continues to take his inhaler, Singulair 10 mg as directed.      The following portions of the patient's history were reviewed and updated as appropriate: CC, ROS, allergies, current medications, past family history, past medical history, past social history, past surgical history and problem list.      Review of Systems   Constitutional: Negative for activity change, appetite change, fatigue and fever.   HENT: Negative for congestion, ear pain, facial swelling, nosebleeds, rhinorrhea, sinus pressure, sore throat and trouble swallowing.    Eyes: Negative for blurred vision, double vision and redness.   Respiratory: Negative for cough, chest tightness, shortness of breath and " "wheezing.    Cardiovascular: Negative for chest pain, palpitations and leg swelling.   Gastrointestinal: Positive for abdominal pain and constipation. Negative for blood in stool and diarrhea.   Endocrine: Negative.    Genitourinary: Negative for dysuria, flank pain, frequency, hematuria and urgency.   Musculoskeletal: Positive for arthralgias, back pain, gait problem and myalgias.   Skin: Negative.    Allergic/Immunologic: Negative.    Neurological: Negative for dizziness, weakness, light-headedness and headache.   Hematological: Negative.    Psychiatric/Behavioral: Positive for stress. Negative for dysphoric mood, self-injury, sleep disturbance and suicidal ideas. The patient is not nervous/anxious.    All other systems reviewed and are negative.      Objective     /74   Pulse 90   Temp 97.5 °F (36.4 °C)   Ht 170.2 cm (67.01\")   Wt 104 kg (230 lb 3.2 oz)   SpO2 97%   BMI 36.05 kg/m²     Physical Exam  Vitals reviewed.   Constitutional:       General: He is not in acute distress.     Appearance: He is well-developed. He is not diaphoretic.   HENT:      Head: Normocephalic and atraumatic.      Comments: Oropharynx not examined.  Patient is presently wearing a face covering/mask due to COVID-19 pandemic.     Right Ear: Hearing, tympanic membrane, ear canal and external ear normal.      Left Ear: Hearing, tympanic membrane, ear canal and external ear normal.   Eyes:      General: Lids are normal. No scleral icterus.     Extraocular Movements:      Right eye: Normal extraocular motion and no nystagmus.      Left eye: Normal extraocular motion and no nystagmus.      Conjunctiva/sclera: Conjunctivae normal.      Pupils: Pupils are equal, round, and reactive to light.   Neck:      Thyroid: No thyromegaly.      Vascular: No carotid bruit or JVD.      Trachea: No tracheal tenderness.   Cardiovascular:      Rate and Rhythm: Normal rate and regular rhythm.      Heart sounds: Normal heart sounds, S1 normal and S2 " normal. No murmur heard.     Pulmonary:      Effort: Pulmonary effort is normal.      Breath sounds: Normal breath sounds.   Chest:      Chest wall: No tenderness.   Abdominal:      General: Bowel sounds are normal.      Palpations: Abdomen is soft. There is no mass.      Tenderness: There is no abdominal tenderness.   Musculoskeletal:      Cervical back: Normal range of motion and neck supple.      Thoracic back: Spasms and tenderness present.      Lumbar back: Spasms and tenderness present.      Right knee: Decreased range of motion.      Left knee: Decreased range of motion.      Right lower leg: No edema.      Left lower leg: No edema.      Right foot: Decreased range of motion.      Left foot: Decreased range of motion.      Comments: No muscular atrophy or flaccidity.   Lymphadenopathy:      Cervical: No cervical adenopathy.      Right cervical: No superficial cervical adenopathy.     Left cervical: No superficial cervical adenopathy.   Skin:     General: Skin is warm and dry.      Capillary Refill: Capillary refill takes less than 2 seconds.      Coloration: Skin is not pale.      Findings: No erythema.      Nails: There is no clubbing.   Neurological:      Mental Status: He is alert and oriented to person, place, and time.      Cranial Nerves: No cranial nerve deficit or facial asymmetry.      Sensory: No sensory deficit.      Motor: No tremor, atrophy or abnormal muscle tone.      Coordination: Coordination normal.      Gait: Gait abnormal (antalgic).      Deep Tendon Reflexes: Reflexes are normal and symmetric.   Psychiatric:         Attention and Perception: He is attentive.         Mood and Affect: Mood normal.         Speech: Speech normal.         Behavior: Behavior normal.         Thought Content: Thought content normal.         Judgment: Judgment normal.       Assessment/Plan     Diagnoses and all orders for this visit:    1. Essential hypertension (Primary)  Assessment & Plan:  Continue lisinopril  30 mg.  Continue under the care of cardiology.  Ambulatory BP monitoring either at home or random community checks.  Patient to report continued elevations >140/90.  Patient may come by office for checks if needed.     Orders:  -     lisinopril (PRINIVIL,ZESTRIL) 30 MG tablet; Take 1 tablet by mouth Daily.  Dispense: 30 tablet; Refill: 5    2. Primary insomnia  -     mirtazapine (REMERON) 30 MG tablet; Take 1.5 tablets by mouth Every Night. 1 tablet 2-3 hours before bedtime  Dispense: 45 tablet; Refill: 5    3. Chronic bilateral low back pain with left-sided sciatica  Assessment & Plan:  Continue to be active.   Heat/Ice alternating to area of tenderness.  Do not apply directly to skin.  Gentle stretching of area and massage.  Avoid overuse or activities that exacerbate.      Orders:  -     methocarbamol (ROBAXIN) 750 MG tablet; Take 1 tablet by mouth 2 (Two) Times a Day As Needed for Muscle Spasms.  Dispense: 60 tablet; Refill: 5    4. Chronic idiopathic constipation  Comments:  continue Linzess.  continue to push fluids, increase fiber.  Overview:  Added automatically from request for surgery [id number]      5. Vitamin D deficiency  Assessment & Plan:  Continue vitamin D as directed.  Report any negative side effects.  We will continue to monitor vitamin D labs and adjust supplement if necessary.        6. Seizures (CMS/[facility])  Assessment & Plan:  Continue Dilantin 200 mg twice daily.  Encouraged patient to reconsider neurology.  Encourage patient to work on stress reduction and sleep hygiene    Orders:  -     Dilantin 100 MG ER capsule; Take 2 capsules by mouth 2 (Two) Times a Day.  Dispense: 120 capsule; Refill: 5    7. Gastroesophageal reflux disease without esophagitis  Assessment & Plan:  Continue PPI.  Advised to avoid known GI triggers such as spicy foods.  Upright 30 minutes after meals and avoid eating large meals.  Several small meals daily as able to avoid overfilling stomach.           Understands disease  processes and need for medications.  Understands reasons for urgent and emergent care.  Patient (& family) verbalized agreement for treatment plan.   Emotional support and active listening provided.  Patient provided time to verbalize feelings.    Reviewed Gastroenterology and urology notes.   Refill on routine maintenance meds today.     RTC 1 month, sooner if needed.             This document has been electronically signed by:  BALDOMERO Thao FNP-C Dragon disclaimer:  Part of this note may be an electronic transcription/translation of spoken language to printed text using the Dragon Dictation System.

## 2021-08-20 ENCOUNTER — OFFICE VISIT (OUTPATIENT)
Dept: UROLOGY | Facility: CLINIC | Age: 51
End: 2021-08-20

## 2021-08-20 VITALS — BODY MASS INDEX: 36.1 KG/M2 | HEIGHT: 67 IN | WEIGHT: 230 LBS

## 2021-08-20 DIAGNOSIS — N40.0 BPH WITHOUT OBSTRUCTION/LOWER URINARY TRACT SYMPTOMS: Primary | ICD-10-CM

## 2021-08-20 DIAGNOSIS — R19.7 DIARRHEA, UNSPECIFIED TYPE: ICD-10-CM

## 2021-08-20 DIAGNOSIS — K59.04 CHRONIC IDIOPATHIC CONSTIPATION: ICD-10-CM

## 2021-08-20 DIAGNOSIS — R33.9 INCOMPLETE BLADDER EMPTYING: ICD-10-CM

## 2021-08-20 DIAGNOSIS — Z86.010 HISTORY OF COLON POLYPS: ICD-10-CM

## 2021-08-20 DIAGNOSIS — R10.84 GENERALIZED ABDOMINAL PAIN: ICD-10-CM

## 2021-08-20 PROCEDURE — 99214 OFFICE O/P EST MOD 30 MIN: CPT | Performed by: NURSE PRACTITIONER

## 2021-08-20 RX ORDER — MAGNESIUM CARB/ALUMINUM HYDROX 105-160MG
296 TABLET,CHEWABLE ORAL TAKE AS DIRECTED
Qty: 592 ML | Refills: 0 | Status: SHIPPED | OUTPATIENT
Start: 2021-08-20 | End: 2022-01-28 | Stop reason: SDUPTHER

## 2021-08-20 NOTE — PROGRESS NOTES
"Chief Complaint  BPH/flank Pain/NEPHROLITHIASIS (1 month follow-up)    Subjective          Jaquan Mckay presents to [facility] for   History of Present Illness     Mr. Jaquan Mckay is a pleasant 50-year-old male, a poor historian, with a significant history of BPH with incomplete bladder emptying, who returns to clinic today for follow-up.  His most recent PSA was 1.0, his PSA 2 years ago was 0.8.  Patient was evaluated in ER over 1 month ago for constipation, and referred to see GI.  Was also placed on Flomax, but today he reports noncompliance related to allergy he states.    He however returns to clinic today in apparent discomfort, complaining of bilateral flank pain, lower abdominal pain, with increased difficulty urinating, and increased abdominal pressure.  Again, we both reviewed/discussed his most recent CT scan which was unremarkable, with no renal/ureteral stones noted.  We discussed his degenerative changes noted in his lumbar spine which was stable on CT.  Patient reports he has not had a bowel movement in 6 days.  He is unable to give us any urine sample today, his PVR is greater than 210 cc.    Objective   Vital Signs:   Ht 170.2 cm (67.01\")   Wt 104 kg (230 lb)   BMI 36.01 kg/m²     Physical Exam  Constitutional:       General: He is in acute distress.      Appearance: He is well-developed. He is obese. He is ill-appearing.   HENT:      Head: Normocephalic and atraumatic.      Right Ear: External ear normal.      Left Ear: External ear normal.   Eyes:      General:         Right eye: No discharge.         Left eye: No discharge.      Conjunctiva/sclera: Conjunctivae normal.      Pupils: Pupils are equal, round, and reactive to light.   Neck:      Thyroid: No thyromegaly.      Trachea: No tracheal deviation.   Cardiovascular:      Rate and Rhythm: Normal rate and regular rhythm.      Heart sounds: No murmur heard.   No friction rub.   Pulmonary:      " Effort: Pulmonary effort is normal. No respiratory distress.      Breath sounds: Normal breath sounds. No stridor.   Abdominal:      General: Bowel sounds are normal. There is distension.      Palpations: Abdomen is soft.      Tenderness: There is abdominal tenderness. There is guarding.   Genitourinary:     Penis: Normal and uncircumcised. No tenderness or discharge.       Testes: Normal.      Rectum: Normal. Guaiac result negative.   Musculoskeletal:         General: Tenderness present. No deformity. Normal range of motion.      Cervical back: Normal range of motion and neck supple.   Skin:     General: Skin is warm and dry.      Capillary Refill: Capillary refill takes less than 2 seconds.      Coloration: Skin is not pale.   Neurological:      Mental Status: He is alert and oriented to person, place, and time.      Cranial Nerves: No cranial nerve deficit.      Coordination: Coordination normal.   Psychiatric:         Behavior: Behavior normal.         Thought Content: Thought content normal.         Judgment: Judgment normal.        Result Review :                 Assessment and Plan    Problem List Items Addressed This Visit        Gastrointestinal Abdominal     Generalized abdominal pain    Overview     Added automatically from request for surgery [id number]         Relevant Medications    magnesium citrate 1.745 GM/30ML solution solution    History of colon polyps    Overview     Added automatically from request for surgery [id number]         Relevant Medications    magnesium citrate 1.745 GM/30ML solution solution    Chronic idiopathic constipation    Overview     Added automatically from request for surgery [id number]         Relevant Medications    magnesium citrate 1.745 GM/30ML solution solution    Diarrhea    Overview     Added automatically from request for surgery [id number]         Relevant Medications    magnesium citrate 1.745 GM/30ML solution solution      Other Visit Diagnoses     BPH without  obstruction/lower urinary tract symptoms    -  Primary    Incomplete bladder emptying        Relevant Orders    Bladder Scan (Completed)        BPH: WE Discussed the pathophysiology of BPH and obstruction. We discussed the static and dynamic effects of BPH as well as using 5 alpha reductase inhibitors versus alpha blockade.  We discussed the indications for transurethral surgery as well.  And/ or other therapeutic options available including all of the newer techniques.  He has no real symptomatology referable to his prostate other than moderate enlargement.  Rather than proceed with an expensive workup he doesn't need, at this time I am recommending he continue with an alpha blocker tamsulosin 0.4 mg capsule daily-patient has been noncompliant    IBS- Patient has significant irritable bowel syndrome, characterized by extreme amounts of constipation.  We spoke about the impact of this on bladder function.  We spoke about  its relationship to recurrent urinary tract infections.  We discussed the need for increasing p.o. fluid intake to at least 2 to 3 L of water daily, and discussed the physiology of colonic motility as well as use of MiraLAX as a bulk laxative versus the newer class of serotonin uptake blockers such as Linzess.  We stressed the need for a daily bowel movement and discussed the [place] stool scale at length.  Patient sees GI, will follow up with them.    Follow-up in clinic as discussed,    Patient is agreeable plan of care    Patient reports that he is not currently experiencing any symptoms of urinary incontinence.    Patient's Body mass index is 36.01 kg/m². indicating that he is obese (BMI >30). Obesity-related health conditions include the following: hypertension, dyslipidemias and GERD. Obesity is improving with lifestyle modifications. BMI is 36.01. We discussed portion control and increasing exercise..    Smoking Cessation Counseling:  Current every day smoker. less than 3 minutes spent  counseling. Will try to cut down.  I advised patient to quit tobacco use and offered support.  I provided patient with tobacco cessation educational material printed in the patient's After Visit Summary.       Follow Up   Return in about 6 months (around 2/20/2022) for Next scheduled follow up for BPH  WITH URINE RETENTION.  Patient was given instructions and counseling regarding his condition or for health maintenance advice. Please see specific information pulled into the AVS if appropriate.

## 2021-08-27 ENCOUNTER — OFFICE VISIT (OUTPATIENT)
Dept: SURGERY | Facility: CLINIC | Age: 51
End: 2021-08-27

## 2021-08-27 VITALS
WEIGHT: 236.2 LBS | DIASTOLIC BLOOD PRESSURE: 103 MMHG | BODY MASS INDEX: 37.07 KG/M2 | HEART RATE: 80 BPM | HEIGHT: 67 IN | SYSTOLIC BLOOD PRESSURE: 147 MMHG

## 2021-08-27 DIAGNOSIS — R33.9 URINARY RETENTION: ICD-10-CM

## 2021-08-27 DIAGNOSIS — K59.04 CHRONIC IDIOPATHIC CONSTIPATION: ICD-10-CM

## 2021-08-27 DIAGNOSIS — R10.84 GENERALIZED ABDOMINAL PAIN: Primary | ICD-10-CM

## 2021-08-27 PROCEDURE — 99213 OFFICE O/P EST LOW 20 MIN: CPT | Performed by: SURGERY

## 2021-08-27 RX ORDER — TAMSULOSIN HYDROCHLORIDE 0.4 MG/1
CAPSULE ORAL
COMMUNITY
Start: 2021-08-23 | End: 2022-02-08

## 2021-08-27 NOTE — PROGRESS NOTES
"Date of Service: 8/27/2021    Subjective   Jaquan Mckay is a 50 y.o. male is being seen for consultation for periumbilical pain today at the request of Tiera Valenzuela APRN    Chief complaint: Suprapubic abdominal pain    Jaquan Mckay is a 50 y.o. obese male smoker, history of chronic constipation, presenting to my clinic with a long history of abdominal pain.  Patient describes that since he had his gallbladder out he has had suprapubic abdominal discomfort.  He has a bowel movement roughly once per week and is being seen by GI for this.  Denies hematochezia or melena.  In addition, he has intermittent issues with difficulty voiding.  He voids once per day and has occasionally had to go to the emergency department to have a Portillo catheter placed.  He is seeing urology for this, had been started on Flomax but states he had an allergic reaction to it with hives.      He has had an EGD and colonoscopy in early August    Past Medical History:   Diagnosis Date   • Allergic    • Anxiety    • Arthritis    • Asthma    • Body piercing     REPORTS CYLICONE IN EARS   • Clotting disorder (CMS/HCC) 2004    had a knee surgery   • Coronary artery disease    • Depression    • DVT (deep venous thrombosis) (CMS/HCC)     RIGHT RIGHT KNEE AFTER SURGERY YEARS AGO IN 2001 OR 2004   • Elevated cholesterol    • Gastric ulcer    • GERD (gastroesophageal reflux disease)    • H/O migraine    • Headache    • Heart attack (CMS/[facility])     REPORTS \"LIGHT HEART ATTACK A LONG TIME AGO\"  \"EARLY 90'S\"   • History of seizures     REPORTS LAST EPISODE WAS AROUND 1995.   • Hostility    • Hyperlipidemia    • Hypertension    • Knee pain, acute     Left   • Low back pain    • Lyme disease    • Migraine    • MRSA (methicillin resistant Staphylococcus aureus)     REPORTS LAST TESTED + 2004. WAS TREATED HE REPORTS.  RIGHT ARM, RIGHT KNEE.   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    • Obesity    • Poor historian    • [place] spotted fever    • Seizures (CMS/HCC)    • " Sleep apnea    • Tattoo    • Wears glasses        Past Surgical History:   Procedure Laterality Date   • ABDOMINAL SURGERY     • BACK SURGERY     • BRAIN SURGERY  1986    Tumor removal    • CARDIAC CATHETERIZATION N/A 9/28/2018    Procedure: Left Heart Cath;  Surgeon: Leandro Daily MD;  Location: [facility] CATH INVASIVE LOCATION;  Service: Cardiology   • CHOLECYSTECTOMY     • COLONOSCOPY     • COLONOSCOPY N/A 8/2/2021    Procedure: COLONOSCOPY FOR SCREENING;  Surgeon: Irving Azar MD;  Location: [facility] OR;  Service: Gastroenterology;  Laterality: N/A;   • CYST REMOVAL      pilonidal cyst   • ELBOW EPICONDYLECTOMY Right 7/23/2020    Procedure: LATERAL EPICONDYLAR RELEASE;  Surgeon: Jose Ruiz MD;  Location: [facility] OR;  Service: Orthopedics;  Laterality: Right;   • ENDOSCOPY     • ENDOSCOPY N/A 8/2/2021    Procedure: ESOPHAGOGASTRODUODENOSCOPY WITH BIOPSY;  Surgeon: Irving Azar MD;  Location: [facility] OR;  Service: Gastroenterology;  Laterality: N/A;  esophageal dilatation to 20mm   • FRACTURE SURGERY Right     elbow   • KNEE ARTHROSCOPY Left 10/20/2017    Procedure: Diagnostic arthroscopy left knee with chondroplasty;  Surgeon: Marco Aguirre MD;  Location: [facility] OR;  Service:    • KNEE ARTHROSCOPY Left 1/11/2021    Procedure: KNEE DIAGNOSTIC ARTHROSCOPY WITH  CHONDROPLASTY patella, femoral and medial;  Surgeon: Raul Eagle MD;  Location: [facility] OR;  Service: Orthopedics;  Laterality: Left;   • KNEE SURGERY Right    • MOUTH SURGERY      FULL MOUTH EXTRACTION   • OTHER SURGICAL HISTORY      REPORTS 7 TICKS REMOVED FROM RIGHT ARM IN 2001 OR 2002   • TENNIS ELBOW RELEASE Right 7/23/2020    Procedure: RIGHT TENNIS ELBOW RELEASE;  Surgeon: Jose Ruiz MD;  Location: [facility] OR;  Service: Orthopedics;  Laterality: Right;   • TUMOR EXCISION      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         Family History   Problem Relation Age of Onset   • Diabetes Mother     • Hypertension Mother    • Stroke Mother    • Diabetes Father    • Skin cancer Father    • Hypertension Father    • Heart attack Father    • Diabetes Brother    • Hypertension Brother    • Heart disease Maternal Aunt    • Heart disease Maternal Uncle    • Heart disease Paternal Aunt    • Heart disease Paternal Uncle    • Heart disease Maternal Grandmother    • Heart disease Maternal Grandfather    • Heart disease Paternal Grandmother    • Heart disease Paternal Grandfather          Social History     Socioeconomic History   • Marital status:      Spouse name: Becca   • Number of children: 2   • Years of education: 12   • Highest education level: Not on file   Tobacco Use   • Smoking status: Current Every Day Smoker     Packs/day: 1.50     Years: 17.00     Pack years: 25.50     Types: Cigars, Cigarettes     Start date: 5/5/2010   • Smokeless tobacco: Never Used   • Tobacco comment: still uses 1.5 packs a day.  he has not smoked in approx 3 weeks.   Vaping Use   • Vaping Use: Never used   Substance and Sexual Activity   • Alcohol use: No   • Drug use: No   • Sexual activity: Defer     Partners: Female     Birth control/protection: None     Smoker           Review of Systems        Constitutional: No fevers, chills or malaise. No unintentional weight loss   Eyes: Denies visual changes    Cardiovascular: Denies chest pain, palpitations   Respiratory: Denies cough or shortness of breath   Abdominal/Gastrointestinal: Reports suprapubic abdominal pain.  Reports constipation   Genitourinary: Reports urinary retention, incomplete bladder emptying   Musculoskeletal: Denies any chronic joint aches, pains or deformities              Skin: No lesions or rashes   Psychiatric: No recent mood changes   Neurologic: No paresthesias or loss of function        Objective     Physical Exam:      08/27/21  1322   Weight: 107 kg (236 lb 3.2 oz)    Body mass index is 36.99 kg/m².  Constitution: BP (!) 147/103   Pulse 80    "Ht 170.2 cm (67\")   Wt 107 kg (236 lb 3.2 oz)   BMI 36.99 kg/m²  . No acute distress.  Obese  Head: Normocephalic, atraumatic.   Eyes: Aligned without strabismus. Conjunctivae noninjected   Ears, Nose, Mouth:  no lesions appreciated   CV: Rhythm  and rate regular   Respiratory: Symmetric chest expansion. No respiratory distress.   Gastrointestinal:  Soft, nontender, nondistended  Skin:  No cyanosis, clubbing or edema bilaterally    Lymphatics: No abnormal cervical or supraclavicular adenopathy  Neurologic: No gross deficits.  Alert and oriented x3  Psychiatric: Normal mood        Results:  I personally reviewed the patient's CT abdomen pelvis from his ER visit in July.  There is mild to moderate colonic stool burden.  There is no evidence of inguinal hernia.  There is a 5.5 cm midline diastases recti with a 1.2 cm umbilical hernia      Assessment     Jaquan Mckay is a 50 y.o. obese male smoker with chronic constipation, chronic suprapubic abdominal pain, chronic urinary retention    I do not believe the patient's very small umbilical hernia is the cause for his discomfort, as he describes it as suprapubic pain.  Even if I felt that this was the underlying cause of the patient's pain, I would not offer surgical intervention without being nicotine free for 6 weeks    I do not believe there is a general surgery correctable issue    Defer to GI and urology               Suraj Glass MD  [facility] General Surgery  "

## 2021-08-30 ENCOUNTER — PATIENT ROUNDING (BHMG ONLY) (OUTPATIENT)
Dept: SURGERY | Facility: CLINIC | Age: 51
End: 2021-08-30

## 2021-08-30 NOTE — PROGRESS NOTES
August 30, 2021    Hello, may I speak with Jaquan Mckay?    My name is Pao     I am  with [facility]  [address] [place] [id number] [phone].    Before we get started may I verify your date of birth? 1970    I am calling to officially welcome you to our practice and ask about your recent visit. Is this a good time to talk? yes    Tell me about your visit with us. What things went well?  Everything went good.       We're always looking for ways to make our patients' experiences even better. Do you have recommendations on ways we may improve?  no    Overall were you satisfied with your first visit to our practice? yes       I appreciate you taking the time to speak with me today. Is there anything else I can do for you? no      Thank you, and have a great day.

## 2021-09-21 ENCOUNTER — OFFICE VISIT (OUTPATIENT)
Dept: FAMILY MEDICINE CLINIC | Facility: CLINIC | Age: 51
End: 2021-09-21

## 2021-09-21 VITALS
HEART RATE: 76 BPM | BODY MASS INDEX: 36.41 KG/M2 | TEMPERATURE: 96.8 F | OXYGEN SATURATION: 99 % | HEIGHT: 67 IN | DIASTOLIC BLOOD PRESSURE: 84 MMHG | WEIGHT: 232 LBS | SYSTOLIC BLOOD PRESSURE: 122 MMHG

## 2021-09-21 DIAGNOSIS — M25.562 CHRONIC PAIN OF LEFT KNEE: ICD-10-CM

## 2021-09-21 DIAGNOSIS — G89.29 CHRONIC PAIN OF LEFT KNEE: ICD-10-CM

## 2021-09-21 DIAGNOSIS — E66.09 CLASS 1 OBESITY DUE TO EXCESS CALORIES WITH SERIOUS COMORBIDITY AND BODY MASS INDEX (BMI) OF 33.0 TO 33.9 IN ADULT: Primary | ICD-10-CM

## 2021-09-21 DIAGNOSIS — K21.9 GASTROESOPHAGEAL REFLUX DISEASE WITHOUT ESOPHAGITIS: ICD-10-CM

## 2021-09-21 DIAGNOSIS — S90.812A ABRASION OF LEFT FOOT, INITIAL ENCOUNTER: ICD-10-CM

## 2021-09-21 PROCEDURE — 99214 OFFICE O/P EST MOD 30 MIN: CPT | Performed by: NURSE PRACTITIONER

## 2021-09-21 RX ORDER — HYDROCODONE BITARTRATE AND ACETAMINOPHEN 5; 325 MG/1; MG/1
1 TABLET ORAL EVERY 8 HOURS PRN
Qty: 9 TABLET | Refills: 0 | Status: SHIPPED | OUTPATIENT
Start: 2021-09-21 | End: 2021-12-20

## 2021-09-21 RX ORDER — FAMOTIDINE 40 MG/1
40 TABLET, FILM COATED ORAL NIGHTLY
Qty: 30 TABLET | Refills: 5 | Status: SHIPPED | OUTPATIENT
Start: 2021-09-21 | End: 2021-12-20 | Stop reason: SDUPTHER

## 2021-09-21 RX ORDER — PHENTERMINE HYDROCHLORIDE 37.5 MG/1
37.5 TABLET ORAL
Qty: 30 TABLET | Refills: 0 | Status: SHIPPED | OUTPATIENT
Start: 2021-09-21 | End: 2021-12-20

## 2021-09-21 NOTE — PROGRESS NOTES
"Subjective   Jaquan Mckay is a 50 y.o. male.     Chief Complaint   Patient presents with   • Knee Pain       History of Present Illness     Left Knee concern-reports his knee has been giving away and he has had approx 3 falls since his last appt.  He reports one fall in his apartment and one outside.  He has been trying to brace more for support.  He has been advised he needs another surgery on his knee.  He reports when he walks he can feel \"my knee cap moving\".   He reports his other fall was while walking.  He has had to call someone to help him get up as he feels he cannot get up without assistance.  He reports his knee is very painful today as he has over extended it a few times.  He reports he is feeling a pop before his leg/knee gives away.   His last surgery was by Dr Raul Eagle.  Hyperlipidemia-On Atorvastatin 40 mg.  No negative side effects.  Patient denies any negative side effects of cholesterol medication.  No reported myalgia or myopathies.  General surgery follow up-has been advised that he has an umbilical hernia.  It was noted to be 1.2 cm.  He has been advised that it will need to be monitored.  He has had an EDG with some dilation due to stricture.   He did have several biopsies.   He will have a repeat scope due to his irritation of his esophagus.  His reflux med was changed to Dexilant 60 mg.  He is noting less reflux.  He reports he was suppose to get an additional med for QHS but did not receive.  He is having some nightly symptoms.    Constipation-reports that the 650 mg of fiber has helped his constipation.   He reports bowel habits are better overall.  Less bloating.   Urology-reports that he is taking pyridium daily that has helped with with bladder spasm.  He has been given Flomax but reports \"it gave me a rash everywhere\".  He reports he did pass a small renal stone about 3 weeks ago.  He reports that he is concerned he has another stone due to pelvic pressure.    Area on top of left " "foot-dropped a box on his foot.  He reports a piece of the plastic stuck into his foot.  He was seen at urgent care.  He continues to have a scabbed area on his foot.  No redness or erythema.  Mildly tender.  No drainage.     The following portions of the patient's history were reviewed and updated as appropriate: CC, ROS, allergies, current medications, past family history, past medical history, past social history, past surgical history and problem list.      Review of Systems   Constitutional: Positive for fatigue. Negative for appetite change, unexpected weight gain and unexpected weight loss.   HENT: Negative for congestion, ear pain, postnasal drip, rhinorrhea, sore throat, swollen glands, trouble swallowing and voice change.    Eyes: Negative for pain and visual disturbance.   Respiratory: Negative for cough, chest tightness, shortness of breath and wheezing.    Cardiovascular: Negative for chest pain, palpitations and leg swelling.   Gastrointestinal: Positive for abdominal pain. Negative for blood in stool, constipation, diarrhea, nausea and indigestion.   Genitourinary: Negative for dysuria, hematuria and urgency.   Musculoskeletal: Positive for arthralgias and gait problem. Negative for back pain, joint swelling and myalgias.   Skin: Negative for color change and skin lesions.        Wound on foot     Allergic/Immunologic: Negative.    Neurological: Negative for numbness, headache and confusion.   Hematological: Negative.    Psychiatric/Behavioral: Positive for dysphoric mood and stress. Negative for sleep disturbance and suicidal ideas. The patient is not nervous/anxious.    All other systems reviewed and are negative.      Objective     /84   Pulse 76   Temp 96.8 °F (36 °C) (Temporal)   Ht 170.2 cm (67\")   Wt 105 kg (232 lb)   SpO2 99%   BMI 36.34 kg/m²     Physical Exam  Vitals reviewed.   Constitutional:       General: He is not in acute distress.     Appearance: He is well-developed. He " is not diaphoretic.   HENT:      Head: Normocephalic and atraumatic.      Comments: Oropharynx not examined.  Patient is presently wearing a face covering/mask due to COVID-19 pandemic.     Right Ear: Hearing, tympanic membrane, ear canal and external ear normal.      Left Ear: Hearing, tympanic membrane, ear canal and external ear normal.   Eyes:      General: Lids are normal. No scleral icterus.     Extraocular Movements:      Right eye: Normal extraocular motion and no nystagmus.      Left eye: Normal extraocular motion and no nystagmus.      Conjunctiva/sclera: Conjunctivae normal.      Pupils: Pupils are equal, round, and reactive to light.   Neck:      Thyroid: No thyromegaly.      Vascular: No carotid bruit or JVD.      Trachea: No tracheal tenderness.   Cardiovascular:      Rate and Rhythm: Normal rate and regular rhythm.      Heart sounds: Normal heart sounds, S1 normal and S2 normal. No murmur heard.     Pulmonary:      Effort: Pulmonary effort is normal.      Breath sounds: Normal breath sounds.   Chest:      Chest wall: No tenderness.   Abdominal:      General: Bowel sounds are normal.      Palpations: Abdomen is soft. There is no mass.      Tenderness: There is no abdominal tenderness.   Musculoskeletal:      Cervical back: Normal range of motion and neck supple.      Thoracic back: Tenderness present.      Lumbar back: Tenderness present.      Right knee: No crepitus. Tenderness present.      Left knee: Bony tenderness and crepitus present. Tenderness present over the medial joint line and lateral joint line.      Right lower leg: No edema.      Left lower leg: No edema.        Feet:       Comments: No muscular atrophy or flaccidity. Multiple varicose veins in BLE   Lymphadenopathy:      Cervical: No cervical adenopathy.      Right cervical: No superficial cervical adenopathy.     Left cervical: No superficial cervical adenopathy.   Skin:     General: Skin is warm and dry.      Capillary Refill:  Capillary refill takes less than 2 seconds.      Coloration: Skin is not pale.      Findings: No erythema.      Nails: There is no clubbing.   Neurological:      Mental Status: He is alert and oriented to person, place, and time.      Cranial Nerves: No cranial nerve deficit or facial asymmetry.      Sensory: No sensory deficit.      Motor: No tremor, atrophy or abnormal muscle tone.      Coordination: Coordination normal.      Deep Tendon Reflexes: Reflexes are normal and symmetric.   Psychiatric:         Attention and Perception: He is attentive.         Mood and Affect: Mood normal.         Speech: Speech normal.         Behavior: Behavior normal.         Thought Content: Thought content normal.         Judgment: Judgment normal.       Assessment/Plan     Diagnoses and all orders for this visit:    1. Class 1 obesity due to excess calories with serious comorbidity and body mass index (BMI) of 33.0 to 33.9 in adult (Primary)  Assessment & Plan:  Will resume Adipex.  Patient has been on for several months.  Encourage low CHO high-protein diet.  Push fluids and limit sodium intake be active as physically able    Orders:  -     phentermine (Adipex-P) 37.5 MG tablet; Take 1 tablet by mouth Every Morning Before Breakfast.  Dispense: 30 tablet; Refill: 0    2. Chronic pain of left knee  Comments:  Updated referral to orthopedic  Orders:  -     Ambulatory Referral to Orthopedic Surgery  -     HYDROcodone-acetaminophen (NORCO) 5-325 MG per tablet; Take 1 tablet by mouth Every 8 (Eight) Hours As Needed for Moderate Pain .  Dispense: 9 tablet; Refill: 0    3. Gastroesophageal reflux disease without esophagitis  Assessment & Plan:  Continue Dexilant as directed since his EGD.  Avoid foods that cause reflux symptoms.    Orders:  -     famotidine (Pepcid) 40 MG tablet; Take 1 tablet by mouth Every Night.  Dispense: 30 tablet; Refill: 5    4. Abrasion of left foot, initial encounter  Comments:  Bactroban ointment to area.   Report any nonhealing area or signs or symptoms of infection  Orders:  -     mupirocin (BACTROBAN) 2 % ointment; Apply  topically to the appropriate area as directed 3 (Three) Times a Day.  Dispense: 30 g; Refill: 0       Patient's Body mass index is 36.34 kg/m². indicating that he is morbidly obese (BMI > 40 or > 35 with obesity - related health condition). Obesity-related health conditions include the following: hypertension, dyslipidemias and GERD. Obesity is unchanged. BMI is is above average. We discussed portion control, increasing exercise and pharmacologic options including Adipex..       Understands disease processes and need for medications.  Understands reasons for urgent and emergent care.  Patient (& family) verbalized agreement for treatment plan.   Emotional support and active listening provided.  Patient provided time to verbalize feelings.    [facility]/PMDP reviewed today and consistent.  Will refill prescribed controlled medication today.  Patient is aware they cannot receive narcotics from any other provider except if under care of pain management or speciality clinic.  Risk and benefits of medication use has been reviewed.  History and physical exam exhibit continued safe and appropriate use of controlled substances.  The patient is aware of the potential for addiction and dependence.  This patient has been made aware of the appropriate use of such medications, including potential risk of somnolence, limited ability to drive and / or work safely, and potential for overdose.    It has also been made clear that these medications are for use by this patient only, without concomitant use of alcohol or other substances unless prescribed/advised by medical provider.  Patient understands they may be subject to UDS and pill counts at random.      Patient considered to be low risk for addiction due to use of single controlled medications.  Patient understands and accepts these risks.  Patient need for  medication will be reassessed at each visit.  Doses will be adjusted according to patient need and findings.    Goal of TX: Patient will not have any adverse reactions of medication.  Patient have reduction in pain symptoms with use of PRN Norco as directed.  Patient will not have any adverse side effects from medication.  Patient will be able to remain active in an outside of home without interference from pain symptoms.    Will start Adipex today      Medication Dispense Information    Hydrocodone Bitartrate/Ac   Dispensed: 8/3/2021 12:00 AM   Written:  8/3/2021   Unit strength: 325MG/5MG   Days supply: 3   Dispense Note: GivenName=PATRICIASurName=ROSEBirthDate=1970Address=[address], [place], [place], [address]   Quantity: 9 each   Pharmacy: [facility], [facility]   Authorizing provider: LENNOX ROE   Received from: [facility] (Fill History)   Brand or Generic:         RTC 2 months, sooner if needed.             This document has been electronically signed by:  BALDOMERO Thao FNP-C Dragon disclaimer:  Part of this note may be an electronic transcription/translation of spoken language to printed text using the Dragon Dictation System.

## 2021-09-27 NOTE — ASSESSMENT & PLAN NOTE
Will resume Adipex.  Patient has been on for several months.  Encourage low CHO high-protein diet.  Push fluids and limit sodium intake be active as physically able

## 2021-10-09 ENCOUNTER — APPOINTMENT (OUTPATIENT)
Dept: GENERAL RADIOLOGY | Facility: HOSPITAL | Age: 51
End: 2021-10-09

## 2021-10-09 ENCOUNTER — HOSPITAL ENCOUNTER (EMERGENCY)
Facility: HOSPITAL | Age: 51
Discharge: HOME | End: 2021-10-09
Attending: EMERGENCY MEDICINE | Admitting: EMERGENCY MEDICINE

## 2021-10-09 VITALS
BODY MASS INDEX: 36.26 KG/M2 | SYSTOLIC BLOOD PRESSURE: 135 MMHG | OXYGEN SATURATION: 98 % | DIASTOLIC BLOOD PRESSURE: 92 MMHG | RESPIRATION RATE: 18 BRPM | WEIGHT: 231 LBS | TEMPERATURE: 98.2 F | HEART RATE: 87 BPM | HEIGHT: 67 IN

## 2021-10-09 DIAGNOSIS — U07.1 COVID-19: Primary | ICD-10-CM

## 2021-10-09 LAB
ALBUMIN SERPL-MCNC: 4.18 G/DL (ref 3.5–5.2)
ALBUMIN/GLOB SERPL: 1.1 G/DL
ALP SERPL-CCNC: 123 U/L (ref 39–117)
ALT SERPL W P-5'-P-CCNC: 64 U/L (ref 1–41)
ANION GAP SERPL CALCULATED.3IONS-SCNC: 11.3 MMOL/L (ref 5–15)
AST SERPL-CCNC: 75 U/L (ref 1–40)
BASOPHILS # BLD AUTO: 0.01 10*3/MM3 (ref 0–0.2)
BASOPHILS NFR BLD AUTO: 0.3 % (ref 0–1.5)
BILIRUB SERPL-MCNC: 0.2 MG/DL (ref 0–1.2)
BUN SERPL-MCNC: 12 MG/DL (ref 6–20)
BUN/CREAT SERPL: 9.8 (ref 7–25)
CALCIUM SPEC-SCNC: 8.5 MG/DL (ref 8.6–10.5)
CHLORIDE SERPL-SCNC: 98 MMOL/L (ref 98–107)
CO2 SERPL-SCNC: 24.7 MMOL/L (ref 22–29)
CREAT SERPL-MCNC: 1.22 MG/DL (ref 0.76–1.27)
CRP SERPL-MCNC: 3.78 MG/DL (ref 0–0.5)
D-LACTATE SERPL-SCNC: 1.3 MMOL/L (ref 0.5–2)
DEPRECATED RDW RBC AUTO: 40.8 FL (ref 37–54)
EOSINOPHIL # BLD AUTO: 0.04 10*3/MM3 (ref 0–0.4)
EOSINOPHIL NFR BLD AUTO: 1.3 % (ref 0.3–6.2)
ERYTHROCYTE [DISTWIDTH] IN BLOOD BY AUTOMATED COUNT: 11.9 % (ref 12.3–15.4)
FERRITIN SERPL-MCNC: 371.9 NG/ML (ref 30–400)
FLUAV RNA RESP QL NAA+PROBE: NOT DETECTED
FLUBV RNA RESP QL NAA+PROBE: NOT DETECTED
GFR SERPL CREATININE-BSD FRML MDRD: 63 ML/MIN/1.73
GLOBULIN UR ELPH-MCNC: 3.7 GM/DL
GLUCOSE SERPL-MCNC: 155 MG/DL (ref 65–99)
HCT VFR BLD AUTO: 44.7 % (ref 37.5–51)
HGB BLD-MCNC: 15.7 G/DL (ref 13–17.7)
HOLD SPECIMEN: NORMAL
HOLD SPECIMEN: NORMAL
IMM GRANULOCYTES # BLD AUTO: 0 10*3/MM3 (ref 0–0.05)
IMM GRANULOCYTES NFR BLD AUTO: 0 % (ref 0–0.5)
LDH SERPL-CCNC: 212 U/L (ref 135–225)
LYMPHOCYTES # BLD AUTO: 1.29 10*3/MM3 (ref 0.7–3.1)
LYMPHOCYTES NFR BLD AUTO: 43.4 % (ref 19.6–45.3)
MCH RBC QN AUTO: 32.3 PG (ref 26.6–33)
MCHC RBC AUTO-ENTMCNC: 35.1 G/DL (ref 31.5–35.7)
MCV RBC AUTO: 92 FL (ref 79–97)
MONOCYTES # BLD AUTO: 0.65 10*3/MM3 (ref 0.1–0.9)
MONOCYTES NFR BLD AUTO: 21.9 % (ref 5–12)
NEUTROPHILS NFR BLD AUTO: 0.98 10*3/MM3 (ref 1.7–7)
NEUTROPHILS NFR BLD AUTO: 33.1 % (ref 42.7–76)
NRBC BLD AUTO-RTO: 0 /100 WBC (ref 0–0.2)
PLATELET # BLD AUTO: 143 10*3/MM3 (ref 140–450)
PMV BLD AUTO: 9.8 FL (ref 6–12)
POTASSIUM SERPL-SCNC: 3.8 MMOL/L (ref 3.5–5.2)
PROT SERPL-MCNC: 7.9 G/DL (ref 6–8.5)
RBC # BLD AUTO: 4.86 10*6/MM3 (ref 4.14–5.8)
SARS-COV-2 RNA RESP QL NAA+PROBE: DETECTED
SODIUM SERPL-SCNC: 134 MMOL/L (ref 136–145)
WBC # BLD AUTO: 2.97 10*3/MM3 (ref 3.4–10.8)
WHOLE BLOOD HOLD SPECIMEN: NORMAL
WHOLE BLOOD HOLD SPECIMEN: NORMAL

## 2021-10-09 PROCEDURE — 80053 COMPREHEN METABOLIC PANEL: CPT | Performed by: PHYSICIAN ASSISTANT

## 2021-10-09 PROCEDURE — M0243 CASIRIVI AND IMDEVI INFUSION: HCPCS | Performed by: EMERGENCY MEDICINE

## 2021-10-09 PROCEDURE — 86140 C-REACTIVE PROTEIN: CPT | Performed by: PHYSICIAN ASSISTANT

## 2021-10-09 PROCEDURE — 99283 EMERGENCY DEPT VISIT LOW MDM: CPT

## 2021-10-09 PROCEDURE — 71045 X-RAY EXAM CHEST 1 VIEW: CPT

## 2021-10-09 PROCEDURE — 85025 COMPLETE CBC W/AUTO DIFF WBC: CPT | Performed by: PHYSICIAN ASSISTANT

## 2021-10-09 PROCEDURE — 87636 SARSCOV2 & INF A&B AMP PRB: CPT | Performed by: PHYSICIAN ASSISTANT

## 2021-10-09 PROCEDURE — 83605 ASSAY OF LACTIC ACID: CPT | Performed by: PHYSICIAN ASSISTANT

## 2021-10-09 PROCEDURE — 83615 LACTATE (LD) (LDH) ENZYME: CPT | Performed by: PHYSICIAN ASSISTANT

## 2021-10-09 PROCEDURE — 25010000002 INJECTION, CASIRIVIMAB AND IMDEVIMAB, 1200 MG: Performed by: EMERGENCY MEDICINE

## 2021-10-09 PROCEDURE — 82728 ASSAY OF FERRITIN: CPT | Performed by: PHYSICIAN ASSISTANT

## 2021-10-09 PROCEDURE — 84145 PROCALCITONIN (PCT): CPT | Performed by: PHYSICIAN ASSISTANT

## 2021-10-09 RX ORDER — EPINEPHRINE 1 MG/ML
0.3 INJECTION, SOLUTION INTRAMUSCULAR; SUBCUTANEOUS ONCE AS NEEDED
Status: DISCONTINUED | OUTPATIENT
Start: 2021-10-09 | End: 2021-10-10 | Stop reason: HOSPADM

## 2021-10-09 RX ORDER — DIPHENHYDRAMINE HYDROCHLORIDE 50 MG/ML
50 INJECTION INTRAMUSCULAR; INTRAVENOUS ONCE AS NEEDED
Status: DISCONTINUED | OUTPATIENT
Start: 2021-10-09 | End: 2021-10-10 | Stop reason: HOSPADM

## 2021-10-09 RX ORDER — DIPHENHYDRAMINE HCL 50 MG
50 CAPSULE ORAL ONCE AS NEEDED
Status: DISCONTINUED | OUTPATIENT
Start: 2021-10-09 | End: 2021-10-10 | Stop reason: HOSPADM

## 2021-10-09 RX ORDER — SODIUM CHLORIDE 9 MG/ML
30 INJECTION, SOLUTION INTRAVENOUS ONCE
Status: COMPLETED | OUTPATIENT
Start: 2021-10-09 | End: 2021-10-09

## 2021-10-09 RX ORDER — METHYLPREDNISOLONE SODIUM SUCCINATE 125 MG/2ML
125 INJECTION, POWDER, LYOPHILIZED, FOR SOLUTION INTRAMUSCULAR; INTRAVENOUS ONCE AS NEEDED
Status: DISCONTINUED | OUTPATIENT
Start: 2021-10-09 | End: 2021-10-10 | Stop reason: HOSPADM

## 2021-10-09 RX ORDER — SODIUM CHLORIDE 0.9 % (FLUSH) 0.9 %
10 SYRINGE (ML) INJECTION AS NEEDED
Status: DISCONTINUED | OUTPATIENT
Start: 2021-10-09 | End: 2021-10-10 | Stop reason: HOSPADM

## 2021-10-09 RX ADMIN — SODIUM CHLORIDE 30 ML: 900 INJECTION INTRAVENOUS at 21:47

## 2021-10-09 RX ADMIN — CASIRIVIMAB AND IMDEVIMAB: 600; 600 INJECTION, SOLUTION, CONCENTRATE INTRAVENOUS at 21:26

## 2021-10-09 NOTE — ED TRIAGE NOTES
MEDICAL SCREENING:    Reason for Visit: covid symptoms, lost taste and smell     Patient initially seen in triage.  The patient was advised further evaluation and diagnostic testing will be needed, some of the treatment and testing will be initiated in the lobby in order to begin the process.  The patient will be returned to the waiting area for the time being and possibly be re-assessed by a subsequent ED provider.  The patient will be brought back to the treatment area in as timely manner as possible.

## 2021-10-10 LAB — PROCALCITONIN SERPL-MCNC: 0.09 NG/ML (ref 0–0.25)

## 2021-10-10 NOTE — ED NOTES
MEDICAL SCREENING:    Reason for Visit: covid symptoms, lost taste and smell     Patient initially seen in triage.  The patient was advised further evaluation and diagnostic testing will be needed, some of the treatment and testing will be initiated in the lobby in order to begin the process.  The patient will be returned to the waiting area for the time being and possibly be re-assessed by a subsequent ED provider.  The patient will be brought back to the treatment area in as timely manner as possible.       Ly Dwyer PA  10/09/21 2023

## 2021-10-10 NOTE — ED PROVIDER NOTES
"Subjective   Loss of taste and smell      Weakness - Generalized  Severity:  Mild  Onset quality:  Gradual  Timing:  Constant  Progression:  Worsening  Chronicity:  New  Context comment:  Exposed to covid-19  Relieved by:  Nothing  Worsened by:  Standing and activity  Ineffective treatments:  None tried  Associated symptoms: cough and fever        Review of Systems   Constitutional: Positive for activity change and fever.   HENT: Negative.    Eyes: Negative.    Respiratory: Positive for cough and wheezing.    Cardiovascular: Negative.    Gastrointestinal: Negative.    Endocrine: Negative.    Musculoskeletal: Negative.    Skin: Negative.    Allergic/Immunologic: Negative.    Hematological: Negative.    Psychiatric/Behavioral: Negative.        Past Medical History:   Diagnosis Date   • Allergic    • Anxiety    • Arthritis    • Asthma    • Body piercing     REPORTS CYLICONE IN EARS   • Clotting disorder (CMS/Formerly [facility]) 2004    had a knee surgery   • Coronary artery disease    • Depression    • DVT (deep venous thrombosis) (CMS/Formerly [facility])     RIGHT RIGHT KNEE AFTER SURGERY YEARS AGO IN 2001 OR 2004   • Elevated cholesterol    • Gastric ulcer    • GERD (gastroesophageal reflux disease)    • H/O migraine    • Headache    • Heart attack (CMS/Formerly [facility])     REPORTS \"LIGHT HEART ATTACK A LONG TIME AGO\"  \"EARLY 90'S\"   • History of seizures     REPORTS LAST EPISODE WAS AROUND 1995.   • Hostility    • Hyperlipidemia    • Hypertension    • Knee pain, acute     Left   • Low back pain    • Lyme disease    • Migraine    • MRSA (methicillin resistant Staphylococcus aureus)     REPORTS LAST TESTED + 2004. WAS TREATED HE REPORTS.  RIGHT ARM, RIGHT KNEE.   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    • Obesity    • Poor historian    • [place] spotted fever    • Seizures (CMS/HCC)    • Sleep apnea    • Tattoo    • Wears glasses        Allergies   Allergen Reactions   • Ciprofloxacin Anaphylaxis and Hives   • Miralax [Polyethylene Glycol] Itching and Rash   • Mobic " "[Meloxicam] Other (See Comments)     Pt states, \"It make my feet and hands go numb and I can't hardly walk.\"    • Paxil [Paroxetine Hcl] Shortness Of Breath     Chest pain    • Peanut-Containing Drug Products Anaphylaxis   • Penicillins Anaphylaxis   • Pristiq [Desvenlafaxine Succinate Er] Dizziness   • Sulfa Antibiotics Anaphylaxis, Itching and Rash   • Doxycycline Hives   • Fish-Derived Products Hives   • Isosorbide Nitrate Rash     Rash, hives, had to use inhaler.    • Movantik [Naloxegol] Rash   • Buspar [Buspirone] Rash   • Clarithromycin Rash   • Clindamycin/Lincomycin Rash   • Codeine Rash   • Contrast Dye Itching and Rash   • Diltiazem Rash   • Flomax [Tamsulosin] Hives   • Gabapentin Rash   • Keflex [Cephalexin] Rash   • Linzess [Linaclotide] Rash   • Metoprolol Rash   • Prednisone Rash and Other (See Comments)     Face, feet, and legs go completely numb per patient   • Robitussin Cough+ Chest Max St [Dextromethorphan-Guaifenesin] Itching   • Shrimp (Diagnostic) Rash   • Spironolactone Rash   • Viibryd [Vilazodone Hcl] Itching and Rash   • Zoloft [Sertraline Hcl] Hives and Itching       Past Surgical History:   Procedure Laterality Date   • ABDOMINAL SURGERY     • BACK SURGERY     • BRAIN SURGERY  1986    Tumor removal    • CARDIAC CATHETERIZATION N/A 9/28/2018    Procedure: Left Heart Cath;  Surgeon: Leandro Dialy MD;  Location: [facility] INVASIVE LOCATION;  Service: Cardiology   • CHOLECYSTECTOMY     • COLONOSCOPY     • COLONOSCOPY N/A 8/2/2021    Procedure: COLONOSCOPY FOR SCREENING;  Surgeon: Irving Azar MD;  Location: [facility] OR;  Service: Gastroenterology;  Laterality: N/A;   • CYST REMOVAL      pilonidal cyst   • ELBOW EPICONDYLECTOMY Right 7/23/2020    Procedure: LATERAL EPICONDYLAR RELEASE;  Surgeon: Jose Ruiz MD;  [facility];  Service: Orthopedics;  Laterality: Right;   • ENDOSCOPY     • ENDOSCOPY N/A 8/2/2021    Procedure: ESOPHAGOGASTRODUODENOSCOPY " WITH BIOPSY;  Surgeon: Irving Azar MD;  Location: [facility] OR;  Service: Gastroenterology;  Laterality: N/A;  esophageal dilatation to 20mm   • FRACTURE SURGERY Right     elbow   • KNEE ARTHROSCOPY Left 10/20/2017    Procedure: Diagnostic arthroscopy left knee with chondroplasty;  Surgeon: Maroc Aguirre MD;  Location: [facility] OR;  Service:    • KNEE ARTHROSCOPY Left 1/11/2021    Procedure: KNEE DIAGNOSTIC ARTHROSCOPY WITH  CHONDROPLASTY patella, femoral and medial;  Surgeon: Raul Eagle MD;  Location: [facility] OR;  Service: Orthopedics;  Laterality: Left;   • KNEE SURGERY Right    • MOUTH SURGERY      FULL MOUTH EXTRACTION   • OTHER SURGICAL HISTORY      REPORTS 7 TICKS REMOVED FROM RIGHT ARM IN 2001 OR 2002   • TENNIS ELBOW RELEASE Right 7/23/2020    Procedure: RIGHT TENNIS ELBOW RELEASE;  Surgeon: Jose Ruiz MD;  Location: [facility] OR;  Service: Orthopedics;  Laterality: Right;   • TUMOR EXCISION      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       Family History   Problem Relation Age of Onset   • Diabetes Mother    • Hypertension Mother    • Stroke Mother    • Diabetes Father    • Skin cancer Father    • Hypertension Father    • Heart attack Father    • Diabetes Brother    • Hypertension Brother    • Heart disease Maternal Aunt    • Heart disease Maternal Uncle    • Heart disease Paternal Aunt    • Heart disease Paternal Uncle    • Heart disease Maternal Grandmother    • Heart disease Maternal Grandfather    • Heart disease Paternal Grandmother    • Heart disease Paternal Grandfather        Social History     Socioeconomic History   • Marital status:      Spouse name: Becca   • Number of children: 2   • Years of education: 12   Tobacco Use   • Smoking status: Current Every Day Smoker     Packs/day: 1.50     Years: 17.00     Pack years: 25.50     Types: Cigars, Cigarettes     Start date: 5/5/2010   • Smokeless tobacco: Never Used   • Tobacco comment: still uses 1.5  packs a day.  he has not smoked in approx 3 weeks.   Vaping Use   • Vaping Use: Never used   Substance and Sexual Activity   • Alcohol use: No   • Drug use: No   • Sexual activity: Defer     Partners: Female     Birth control/protection: None           Objective   Physical Exam  Vitals and nursing note reviewed.   HENT:      Head: Normocephalic.      Nose: Nose normal.      Mouth/Throat:      Mouth: Mucous membranes are moist.   Eyes:      Pupils: Pupils are equal, round, and reactive to light.   Cardiovascular:      Rate and Rhythm: Normal rate.      Pulses: Normal pulses.   Pulmonary:      Breath sounds: Wheezing present.   Abdominal:      General: Abdomen is flat.   Musculoskeletal:         General: Normal range of motion.      Cervical back: Normal range of motion.   Skin:     General: Skin is warm.      Capillary Refill: Capillary refill takes less than 2 seconds.   Neurological:      General: No focal deficit present.      Mental Status: He is alert.      Motor: Weakness present.   Psychiatric:         Mood and Affect: Mood normal.         Procedures           ED Course                                           MDM    Final diagnoses:   COVID-19       ED Disposition  ED Disposition     ED Disposition Condition Comment    Discharge Stable           Tiera Valenzuela, APRN  [address] [facility] [address] [phone]    Schedule an appointment as soon as possible for a visit   If symptoms worsen         Medication List      No changes were made to your prescriptions during this visit.          Jerry Dwyer MD  10/10/21 0112       Jerry Dwyer MD  10/10/21 0113

## 2021-10-10 NOTE — ED NOTES
MD has discussed The FDA has authorized the emergency use of REGN-COV2  , which is not an FDA approved drug. Discussions with the patient regarding the risks and benefits of  REGN-COV2  have occurred. The patient recognizes that this is an investigational treatment which may offer significant known and potential benefits and risks, the extent of which are unknown. Information on available alternative treatments and the risks and benefits of those alternatives was discussed. The patient received the “Fact Sheet for Patients Parents and Caregivers”. All questions from the patient were answered to satisfaction. The patient has the option to accept or refuse treatment with REGN-COV2 and would like to accept treatment.  Education handouts have been given to patient.    Counseling regarding continued self isolation and use of infection control measures according to the CDC guidelines has occurred.         Virginie Ruiz RN  10/09/21 1784

## 2021-10-11 ENCOUNTER — TELEPHONE (OUTPATIENT)
Dept: FAMILY MEDICINE CLINIC | Facility: CLINIC | Age: 51
End: 2021-10-11

## 2021-10-11 DIAGNOSIS — U07.1 COVID-19: Primary | ICD-10-CM

## 2021-10-11 RX ORDER — AZITHROMYCIN 250 MG/1
TABLET, FILM COATED ORAL
Qty: 6 TABLET | Refills: 0 | Status: SHIPPED | OUTPATIENT
Start: 2021-10-11 | End: 2021-10-28

## 2021-10-11 RX ORDER — DEXTROMETHORPHAN HYDROBROMIDE AND PROMETHAZINE HYDROCHLORIDE 15; 6.25 MG/5ML; MG/5ML
5 SYRUP ORAL 4 TIMES DAILY PRN
Qty: 180 ML | Refills: 0 | Status: SHIPPED | OUTPATIENT
Start: 2021-10-11 | End: 2021-12-20 | Stop reason: SDUPTHER

## 2021-10-11 NOTE — TELEPHONE ENCOUNTER
----- Message from BALDOMERO Thao sent at 10/11/2021 11:07 AM EDT -----  I am sending meds but he needs to monitor himself for possible reactions due to his allergy list.  Use his inhaler for shortness of breath and be up moving around.  Push fluids-water or gatorades  ----- Message -----  From: Gissell Isidro MA  Sent: 10/11/2021   8:57 AM EDT  To: BALDOMERO Thao    Patient calling, would like medication sent in. He tested positive for COVID Friday.     No taste or smell, headaches, head congestion, cough.     [facility] [place].

## 2021-10-21 ENCOUNTER — OFFICE VISIT (OUTPATIENT)
Dept: FAMILY MEDICINE CLINIC | Facility: CLINIC | Age: 51
End: 2021-10-21

## 2021-10-21 VITALS
HEART RATE: 113 BPM | SYSTOLIC BLOOD PRESSURE: 136 MMHG | OXYGEN SATURATION: 100 % | DIASTOLIC BLOOD PRESSURE: 84 MMHG | BODY MASS INDEX: 35.5 KG/M2 | TEMPERATURE: 97.3 F | HEIGHT: 67 IN | WEIGHT: 226.2 LBS

## 2021-10-21 DIAGNOSIS — F41.9 ANXIETY: ICD-10-CM

## 2021-10-21 DIAGNOSIS — E16.2 HYPOGLYCEMIA: ICD-10-CM

## 2021-10-21 DIAGNOSIS — E78.2 MIXED HYPERLIPIDEMIA: ICD-10-CM

## 2021-10-21 DIAGNOSIS — E55.9 VITAMIN D DEFICIENCY: ICD-10-CM

## 2021-10-21 DIAGNOSIS — S30.810A EXCORIATION OF BUTTOCK, INITIAL ENCOUNTER: ICD-10-CM

## 2021-10-21 DIAGNOSIS — R56.9 SEIZURES (HCC): ICD-10-CM

## 2021-10-21 DIAGNOSIS — E66.09 CLASS 1 OBESITY DUE TO EXCESS CALORIES WITH SERIOUS COMORBIDITY AND BODY MASS INDEX (BMI) OF 33.0 TO 33.9 IN ADULT: Primary | ICD-10-CM

## 2021-10-21 DIAGNOSIS — I10 ESSENTIAL HYPERTENSION: ICD-10-CM

## 2021-10-21 PROCEDURE — 99214 OFFICE O/P EST MOD 30 MIN: CPT | Performed by: NURSE PRACTITIONER

## 2021-10-21 RX ORDER — NYSTATIN 100000 U/G
1 OINTMENT TOPICAL 2 TIMES DAILY
Qty: 60 G | Refills: 2 | Status: SHIPPED | OUTPATIENT
Start: 2021-10-21 | End: 2022-02-03

## 2021-10-21 RX ORDER — FLUTICASONE PROPIONATE 220 UG/1
1 AEROSOL, METERED RESPIRATORY (INHALATION)
Qty: 12 G | Refills: 11 | Status: SHIPPED | OUTPATIENT
Start: 2021-10-21 | End: 2022-02-03

## 2021-10-21 NOTE — PROGRESS NOTES
"Subjective   Jaquan Mckay is a 50 y.o. male.     Chief Complaint   Patient presents with   • Weight Loss       History of Present Illness     Weight loss-has been ordered Adipex.  He has not been able to be consistent due to being ill.  He has started back about 2 days ago.  He would like to continue.   Recent Covid-reports he did \"ok\".  He had been having headaches prior to testing positive.  He then noted he could not smell or taste. He tested positive and got an infusion.  He reports his left ear and nostril continues to be congested.    Flatulence with stool leakage-started on Sunday last week.  He reports that he cannot tell that he needs to pass gas and then it \"explodes\" and \"runs down\".   He has had increased nausea.  He reports his buttock \"is so raw\" from the loose stool.  Memory concern-has had some STM changes since having covid.  He reports he is forgetting little things.  He is frustrated.    Increased agitation-reports has been more increased.  He is not sleeping.  He has been doubling remeron at times but it is helping.  He has been on multiple meds that have not helped.  He has tried melatonin but it did not help. He has also tried an OTC sleep aide.      The following portions of the patient's history were reviewed and updated as appropriate: CC, ROS, allergies, current medications, past family history, past medical history, past social history, past surgical history and problem list.      Review of Systems   Constitutional: Positive for fatigue. Negative for activity change, appetite change and fever.   HENT: Negative for congestion, ear pain, facial swelling, nosebleeds, rhinorrhea, sinus pressure, sore throat and trouble swallowing.    Eyes: Negative for blurred vision, double vision and redness.   Respiratory: Positive for shortness of breath. Negative for cough, chest tightness and wheezing.    Cardiovascular: Negative for chest pain, palpitations and leg swelling.   Gastrointestinal: " "Positive for abdominal pain. Negative for blood in stool, constipation, diarrhea, vomiting and indigestion.   Endocrine: Negative.    Genitourinary: Negative for dysuria, flank pain, frequency, hematuria and urgency.   Musculoskeletal: Positive for arthralgias and back pain.   Skin: Negative.    Allergic/Immunologic: Negative.    Neurological: Positive for dizziness. Negative for weakness, light-headedness and headache.   Hematological: Negative.    Psychiatric/Behavioral: Positive for agitation, dysphoric mood, sleep disturbance and stress. Negative for self-injury and suicidal ideas. The patient is nervous/anxious.    All other systems reviewed and are negative.      Objective     /84   Pulse 113   Temp 97.3 °F (36.3 °C)   Ht 170.2 cm (67.01\")   Wt 103 kg (226 lb 3.2 oz)   SpO2 100%   BMI 35.42 kg/m²     Physical Exam  Vitals reviewed.   Constitutional:       General: He is not in acute distress.     Appearance: He is well-developed. He is obese. He is not diaphoretic.   HENT:      Head: Normocephalic and atraumatic.      Comments: Oropharynx not examined.  Patient is presently wearing a face covering/mask due to COVID-19 pandemic.     Right Ear: Hearing, tympanic membrane, ear canal and external ear normal.      Left Ear: Hearing, tympanic membrane, ear canal and external ear normal.   Eyes:      General: Lids are normal. No scleral icterus.     Extraocular Movements:      Right eye: Normal extraocular motion and no nystagmus.      Left eye: Normal extraocular motion and no nystagmus.      Conjunctiva/sclera: Conjunctivae normal.      Pupils: Pupils are equal, round, and reactive to light.   Neck:      Thyroid: No thyromegaly.      Vascular: No carotid bruit or JVD.      Trachea: No tracheal tenderness.   Cardiovascular:      Rate and Rhythm: Normal rate and regular rhythm.      Heart sounds: Normal heart sounds, S1 normal and S2 normal. No murmur heard.      Pulmonary:      Effort: Pulmonary effort " is normal.      Breath sounds: Normal breath sounds.   Chest:      Chest wall: No tenderness.   Abdominal:      General: Bowel sounds are normal.      Palpations: Abdomen is soft. There is no mass.      Tenderness: There is no abdominal tenderness.   Musculoskeletal:      Cervical back: Neck supple. Decreased range of motion.      Thoracic back: Decreased range of motion.      Lumbar back: Tenderness present. Decreased range of motion. Positive right straight leg raise test and positive left straight leg raise test.      Right lower leg: No edema.      Left lower leg: No edema.      Comments: No muscular atrophy or flaccidity.   Lymphadenopathy:      Cervical: No cervical adenopathy.      Right cervical: No superficial cervical adenopathy.     Left cervical: No superficial cervical adenopathy.   Skin:     General: Skin is warm and dry.      Capillary Refill: Capillary refill takes less than 2 seconds.      Coloration: Skin is not pale.      Findings: No erythema.      Nails: There is no clubbing.      Comments: Multiple varicose veins on BLE   Neurological:      Mental Status: He is alert and oriented to person, place, and time.      Cranial Nerves: No cranial nerve deficit or facial asymmetry.      Sensory: No sensory deficit.      Motor: No tremor, atrophy or abnormal muscle tone.      Coordination: Coordination normal.      Deep Tendon Reflexes: Reflexes are normal and symmetric.   Psychiatric:         Attention and Perception: He is attentive.         Mood and Affect: Mood is anxious and depressed. Affect is flat.         Speech: Speech normal.         Behavior: Behavior normal. Behavior is cooperative.         Thought Content: Thought content normal.         Cognition and Memory: Cognition normal.         Judgment: Judgment normal.       Assessment/Plan     Diagnoses and all orders for this visit:    1. Class 1 obesity due to excess calories with serious comorbidity and body mass index (BMI) of 33.0 to 33.9 in  adult (Primary)  Assessment & Plan:  Adipex as directed.  Dietary counseling provided:  Healthy food choices including fruits and vegetables, limit soda and junk foods, adequate water intake.  Be as active as physically able.    Orders:  -     Vitamin B12; Future    2. Seizures (HCC)  Assessment & Plan:  Continue PPI.  Advised to avoid known GI triggers such as spicy foods.  Upright 30 minutes after meals and avoid eating large meals.  Several small meals daily as able to avoid overfilling stomach.        Orders:  -     CBC & Differential; Future  -     Comprehensive Metabolic Panel; Future  -     Phenytoin level, free; Future  -     Phenytoin level, total; Future    3. Essential hypertension  Assessment & Plan:  Continue lisinopril 30 mg.  Continue under the care of cardiology.  Ambulatory BP monitoring either at home or random community checks.  Patient to report continued elevations >140/90.  Patient may come by office for checks if needed.     Orders:  -     CBC & Differential; Future  -     Comprehensive Metabolic Panel; Future    4. Excoriation of buttock, initial encounter  -     nystatin (MYCOSTATIN) 725626 UNIT/GM ointment; Apply 1 application topically to the appropriate area as directed 2 (Two) Times a Day. To skin irritation on buttock  Dispense: 60 g; Refill: 2    5. Vitamin D deficiency  -     Vitamin D 25 Hydroxy; Future    6. Hypoglycemia  -     Hemoglobin A1c; Future    7. Mixed hyperlipidemia  -     Lipid Panel; Future    8. Anxiety  Assessment & Plan:  Has failed multiple medications  Under care of comp care      Orders:  -     TSH; Future  -     T4, Free; Future    Other orders  -     fluticasone (Flovent HFA) 220 MCG/ACT inhaler; Inhale 1 puff 2 (Two) Times a Day.  Dispense: 12 g; Refill: 11       Patient's Body mass index is 35.42 kg/m². indicating that he is morbidly obese (BMI > 40 or > 35 with obesity - related health condition). Obesity-related health conditions include the following:  hypertension, coronary heart disease, dyslipidemias, GERD and osteoarthritis. Obesity is unchanged. BMI is is above average. We discussed portion control and increasing exercise.     Understands disease processes and need for medications.  Understands reasons for urgent and emergent care.  Patient (& family) verbalized agreement for treatment plan.   Emotional support and active listening provided.  Patient provided time to verbalize feelings.    Will review medications and allergies before trial of different anxiety med.    Patient would like to obtain his labs at [facility].      Continue to be active as able. Work on weight reduction  [place] foods till GI upset improves.     RTC 1-2 weeks, sooner if needed.  Report any worsening symptoms.          This document has been electronically signed by:  BALDOMERO Thao FNP-C Dragon disclaimer:  Part of this note may be an electronic transcription/translation of spoken language to printed text using the Dragon Dictation System.

## 2021-10-22 ENCOUNTER — TELEPHONE (OUTPATIENT)
Dept: FAMILY MEDICINE CLINIC | Facility: CLINIC | Age: 51
End: 2021-10-22

## 2021-10-22 NOTE — TELEPHONE ENCOUNTER
Talked with patients comp care worker layla, she was notified of providers message and states that they will contact him to schedule an appt to be seen.

## 2021-10-22 NOTE — TELEPHONE ENCOUNTER
----- Message from BALDOMERO Thao sent at 10/22/2021  8:57 AM EDT -----  Regarding: contact [facility] in [place]  He is followed by [facility] and [place] for mental health support.  He reports his Compcare worker's name is Gabriella.  Because he is having increased anxiety and stress as well as occasional thoughts of suicide without any plan at this time, see if they can increase his therapy sessions to weekly instead of every 3 weeks.

## 2021-10-28 ENCOUNTER — OFFICE VISIT (OUTPATIENT)
Dept: FAMILY MEDICINE CLINIC | Facility: CLINIC | Age: 51
End: 2021-10-28

## 2021-10-28 VITALS
WEIGHT: 226.2 LBS | SYSTOLIC BLOOD PRESSURE: 122 MMHG | BODY MASS INDEX: 35.5 KG/M2 | HEIGHT: 67 IN | HEART RATE: 94 BPM | OXYGEN SATURATION: 98 % | TEMPERATURE: 97.1 F | DIASTOLIC BLOOD PRESSURE: 80 MMHG

## 2021-10-28 DIAGNOSIS — F33.1 MODERATE EPISODE OF RECURRENT MAJOR DEPRESSIVE DISORDER (HCC): ICD-10-CM

## 2021-10-28 DIAGNOSIS — H91.92 DECREASED HEARING OF LEFT EAR: Primary | ICD-10-CM

## 2021-10-28 DIAGNOSIS — K59.1 FUNCTIONAL DIARRHEA: ICD-10-CM

## 2021-10-28 DIAGNOSIS — M25.522 LEFT ELBOW PAIN: ICD-10-CM

## 2021-10-28 DIAGNOSIS — F32.A MOOD DISORDER OF DEPRESSED TYPE: ICD-10-CM

## 2021-10-28 PROCEDURE — 99214 OFFICE O/P EST MOD 30 MIN: CPT | Performed by: NURSE PRACTITIONER

## 2021-10-28 RX ORDER — CHOLESTYRAMINE 4 G/9G
1 POWDER, FOR SUSPENSION ORAL
Qty: 30 EACH | Refills: 0 | Status: SHIPPED | OUTPATIENT
Start: 2021-10-28 | End: 2021-11-15

## 2021-10-28 RX ORDER — CHOLESTYRAMINE 4 G/9G
1 POWDER, FOR SUSPENSION ORAL
Qty: 30 EACH | Refills: 0 | Status: SHIPPED | OUTPATIENT
Start: 2021-10-28 | End: 2021-10-28 | Stop reason: SDUPTHER

## 2021-10-28 RX ORDER — ARIPIPRAZOLE 2 MG/1
2 TABLET ORAL DAILY
Qty: 30 TABLET | Refills: 0 | Status: SHIPPED | OUTPATIENT
Start: 2021-10-28 | End: 2021-10-28 | Stop reason: SDUPTHER

## 2021-10-28 RX ORDER — ARIPIPRAZOLE 2 MG/1
2 TABLET ORAL DAILY
Qty: 30 TABLET | Refills: 0 | Status: SHIPPED | OUTPATIENT
Start: 2021-10-28 | End: 2021-11-18

## 2021-10-28 NOTE — PROGRESS NOTES
"Subjective   Jaquan Mckay is a 50 y.o. male.     Chief Complaint   Patient presents with   • Anxiety       History of Present Illness     Anxiety-chronically ongoing. He has a [facility] care appt tomorrow with therapist.  He reports today he had a friend to pass last night and he is struggling.  This is also near the time that his wife passed and he continues to mourn for her at times.    Hearing-would like an appt for audiology in [place]. He reports he feels his hearing is decreasing and he does not hear well out of his left ear.  He reports that it seems to be getting worse.   Skin buttock excoriation-improving.  He reports he had about 5 episodes of diarrhea yesterday.    Breathing-reports not much improved with Flovent.  He reports his nebulizer is not working after it \"got too hot and shut down\".  He reports he plans to take the machine back to the DME provider to see if he can get the machine exchanged.    Left arm concern-at his elbow.  Reports it feels like the nerve is trapped.  He request to go see Dr Dubin.  He has seen Dr. Dubin in the past for various orthopedic problems.      The following portions of the patient's history were reviewed and updated as appropriate: CC, ROS, allergies, current medications, past family history, past medical history, past social history, past surgical history and problem list.      Review of Systems   Constitutional: Positive for fatigue. Negative for activity change, appetite change and fever.   HENT: Negative for congestion, ear pain, facial swelling, nosebleeds, rhinorrhea, sinus pressure, sore throat and trouble swallowing.    Eyes: Negative for blurred vision, double vision and redness.   Respiratory: Negative for cough, chest tightness, shortness of breath and wheezing.    Cardiovascular: Negative for chest pain, palpitations and leg swelling.   Gastrointestinal: Negative for abdominal pain, blood in stool, constipation, diarrhea, nausea and vomiting.   Endocrine: " "Negative.    Genitourinary: Negative for dysuria, flank pain, frequency, hematuria and urgency.   Musculoskeletal: Positive for arthralgias, back pain, gait problem and myalgias.   Skin: Negative.    Allergic/Immunologic: Negative.    Neurological: Positive for headache. Negative for dizziness, weakness and light-headedness.   Hematological: Negative.    Psychiatric/Behavioral: Positive for decreased concentration, sleep disturbance, depressed mood and stress. Negative for agitation, hallucinations, self-injury and suicidal ideas. The patient is nervous/anxious.    All other systems reviewed and are negative.      Objective     /80   Pulse 94   Temp 97.1 °F (36.2 °C)   Ht 170.2 cm (67.01\")   Wt 103 kg (226 lb 3.2 oz)   SpO2 98%   BMI 35.42 kg/m²     Physical Exam  Vitals reviewed.   Constitutional:       General: He is not in acute distress.     Appearance: He is well-developed. He is obese. He is not diaphoretic.   HENT:      Head: Normocephalic and atraumatic.      Comments: Oropharynx not examined.  Patient is presently wearing a face covering/mask due to COVID-19 pandemic.     Right Ear: Hearing, tympanic membrane, ear canal and external ear normal.      Left Ear: Hearing, tympanic membrane, ear canal and external ear normal.   Eyes:      General: Lids are normal. No scleral icterus.     Extraocular Movements:      Right eye: Normal extraocular motion and no nystagmus.      Left eye: Normal extraocular motion and no nystagmus.      Conjunctiva/sclera: Conjunctivae normal.      Pupils: Pupils are equal, round, and reactive to light.   Neck:      Thyroid: No thyromegaly.      Vascular: No carotid bruit or JVD.      Trachea: No tracheal tenderness.   Cardiovascular:      Rate and Rhythm: Normal rate and regular rhythm.      Heart sounds: Normal heart sounds, S1 normal and S2 normal. No murmur heard.      Pulmonary:      Effort: Pulmonary effort is normal.      Breath sounds: Normal breath sounds. "   Chest:      Chest wall: No tenderness.   Abdominal:      General: Bowel sounds are normal.      Palpations: Abdomen is soft. There is no mass.      Tenderness: There is no abdominal tenderness.   Musculoskeletal:      Right elbow: No swelling. Tenderness present.      Left elbow: No swelling. Tenderness present.      Cervical back: Neck supple. Decreased range of motion.      Thoracic back: Decreased range of motion.      Lumbar back: Tenderness present. Decreased range of motion. Positive right straight leg raise test and positive left straight leg raise test.      Right lower leg: No edema.      Left lower leg: No edema.      Comments: No muscular atrophy or flaccidity.   Lymphadenopathy:      Cervical: No cervical adenopathy.      Right cervical: No superficial cervical adenopathy.     Left cervical: No superficial cervical adenopathy.   Skin:     General: Skin is warm and dry.      Capillary Refill: Capillary refill takes less than 2 seconds.      Coloration: Skin is not pale.      Findings: No erythema.      Nails: There is no clubbing.      Comments: Multiple varicose veins on BLE   Neurological:      Mental Status: He is alert and oriented to person, place, and time.      Cranial Nerves: No cranial nerve deficit or facial asymmetry.      Sensory: No sensory deficit.      Motor: No tremor, atrophy or abnormal muscle tone.      Coordination: Coordination normal.      Deep Tendon Reflexes: Reflexes are normal and symmetric.   Psychiatric:         Attention and Perception: He is attentive.         Mood and Affect: Mood is anxious and depressed. Affect is flat.         Speech: Speech normal.         Behavior: Behavior normal. Behavior is cooperative.         Thought Content: Thought content normal.         Cognition and Memory: Cognition normal.         Judgment: Judgment normal.       Assessment/Plan     Diagnoses and all orders for this visit:    1. Decreased hearing of left ear (Primary)  -     Ambulatory  Referral to Audiology    2. Left elbow pain  -     Ambulatory Referral to Orthopedic Surgery    3. Moderate episode of recurrent major depressive disorder (HCC)  -     Discontinue: ARIPiprazole (Abilify) 2 MG tablet; Take 1 tablet by mouth Daily.  Dispense: 30 tablet; Refill: 0  -     ARIPiprazole (Abilify) 2 MG tablet; Take 1 tablet by mouth Daily.  Dispense: 30 tablet; Refill: 0    4. Mood disorder of depressed type  -     Discontinue: ARIPiprazole (Abilify) 2 MG tablet; Take 1 tablet by mouth Daily.  Dispense: 30 tablet; Refill: 0  -     ARIPiprazole (Abilify) 2 MG tablet; Take 1 tablet by mouth Daily.  Dispense: 30 tablet; Refill: 0    5. Functional diarrhea  Overview:  Added automatically from request for surgery [id number]    Orders:  -     Discontinue: cholestyramine (Questran) 4 g packet; Take 1 packet by mouth 3 (Three) Times a Day With Meals.  Dispense: 30 each; Refill: 0  -     cholestyramine (Questran) 4 g packet; Take 1 packet by mouth 3 (Three) Times a Day With Meals.  Dispense: 30 each; Refill: 0       Patient's Body mass index is 35.42 kg/m². indicating that he is morbidly obese (BMI > 40 or > 35 with obesity - related health condition). Obesity-related health conditions include the following: hypertension, dyslipidemias, GERD and osteoarthritis. Obesity is unchanged. BMI is is above average . We discussed portion control, increasing exercise and pharmacologic options including Adipex..       Understands disease processes and need for medications.  Understands reasons for urgent and emergent care.  Patient (& family) verbalized agreement for treatment plan.   Emotional support and active listening provided.  Patient provided time to verbalize feelings.    Trial of abilify.    Keep upcoming appt with comp care for therapy.     RTC 1 month, sooner if needed.             This document has been electronically signed by:  BALDOMERO Thao FNP-C Dragon disclaimer:  Part of this note may be an  electronic transcription/translation of spoken language to printed text using the Dragon Dictation System.

## 2021-10-29 NOTE — ASSESSMENT & PLAN NOTE
Adipex as directed.  Dietary counseling provided:  Healthy food choices including fruits and vegetables, limit soda and junk foods, adequate water intake.  Be as active as physically able.

## 2021-10-29 NOTE — ASSESSMENT & PLAN NOTE
Continue PPI.  Advised to avoid known GI triggers such as spicy foods.  Upright 30 minutes after meals and avoid eating large meals.  Several small meals daily as able to avoid overfilling stomach.       LV:4/10/2019 hyperhidrosis    Routed to Obdulia Tesfaye  Electronically Signed by:    WOLF Meier

## 2021-11-11 ENCOUNTER — APPOINTMENT (OUTPATIENT)
Dept: GENERAL RADIOLOGY | Facility: HOSPITAL | Age: 51
End: 2021-11-11

## 2021-11-11 ENCOUNTER — HOSPITAL ENCOUNTER (EMERGENCY)
Facility: HOSPITAL | Age: 51
Discharge: HOME | End: 2021-11-11
Attending: STUDENT IN AN ORGANIZED HEALTH CARE EDUCATION/TRAINING PROGRAM | Admitting: STUDENT IN AN ORGANIZED HEALTH CARE EDUCATION/TRAINING PROGRAM

## 2021-11-11 VITALS
SYSTOLIC BLOOD PRESSURE: 122 MMHG | TEMPERATURE: 98.4 F | BODY MASS INDEX: 36.1 KG/M2 | RESPIRATION RATE: 16 BRPM | HEART RATE: 74 BPM | OXYGEN SATURATION: 96 % | DIASTOLIC BLOOD PRESSURE: 80 MMHG | HEIGHT: 67 IN | WEIGHT: 230 LBS

## 2021-11-11 DIAGNOSIS — G89.29 CHRONIC PAIN OF LEFT KNEE: ICD-10-CM

## 2021-11-11 DIAGNOSIS — M25.562 CHRONIC PAIN OF LEFT KNEE: ICD-10-CM

## 2021-11-11 DIAGNOSIS — S99.912A INJURY OF LEFT ANKLE, INITIAL ENCOUNTER: ICD-10-CM

## 2021-11-11 DIAGNOSIS — S69.91XA HAND INJURY, RIGHT, INITIAL ENCOUNTER: Primary | ICD-10-CM

## 2021-11-11 PROCEDURE — 73610 X-RAY EXAM OF ANKLE: CPT

## 2021-11-11 PROCEDURE — 99283 EMERGENCY DEPT VISIT LOW MDM: CPT

## 2021-11-11 PROCEDURE — 73130 X-RAY EXAM OF HAND: CPT

## 2021-11-11 RX ORDER — TIZANIDINE 4 MG/1
4 TABLET ORAL EVERY 8 HOURS PRN
Qty: 30 TABLET | Refills: 0 | Status: SHIPPED | OUTPATIENT
Start: 2021-11-11 | End: 2022-02-03

## 2021-11-11 NOTE — ED NOTES
MEDICAL SCREENING:    Reason for Visit: fall    Patient initially seen in triage.  The patient was advised further evaluation and diagnostic testing will be needed, some of the treatment and testing will be initiated in the lobby in order to begin the process.  The patient will be returned to the waiting area for the time being and possibly be re-assessed by a subsequent ED provider.  The patient will be brought back to the treatment area in as timely manner as possible.         Rick Mehta II, PA  11/11/21 0059

## 2021-11-11 NOTE — ED PROVIDER NOTES
"Subjective   50-year-old male presents the ER with chief complaint of fall.  Patient said he fell injuring his right hand and left ankle          Review of Systems   Constitutional: Negative.  Negative for fever.   HENT: Negative.    Respiratory: Negative.    Cardiovascular: Negative.  Negative for chest pain.   Gastrointestinal: Negative.  Negative for abdominal pain.   Endocrine: Negative.    Genitourinary: Negative.  Negative for dysuria.   Musculoskeletal: Positive for arthralgias.   Skin: Negative.    Neurological: Negative.    Psychiatric/Behavioral: Negative.    All other systems reviewed and are negative.      Past Medical History:   Diagnosis Date   • Allergic    • Anxiety    • Arthritis    • Asthma    • Body piercing     REPORTS CYLICONE IN EARS   • Clotting disorder ([facility]ly [facility]) 2004    had a knee surgery   • Coronary artery disease    • Depression    • DVT (deep venous thrombosis) (Formerly [facility])     RIGHT RIGHT KNEE AFTER SURGERY YEARS AGO IN 2001 OR 2004   • Elevated cholesterol    • Gastric ulcer    • GERD (gastroesophageal reflux disease)    • H/O migraine    • Headache    • Heart attack ([facility])     REPORTS \"LIGHT HEART ATTACK A LONG TIME AGO\"  \"EARLY 90'S\"   • History of seizures     REPORTS LAST EPISODE WAS AROUND 1995.   • Hostility    • Hyperlipidemia    • Hypertension    • Knee pain, acute     Left   • Low back pain    • Lyme disease    • Migraine    • MRSA (methicillin resistant Staphylococcus aureus)     REPORTS LAST TESTED + 2004. WAS TREATED HE REPORTS.  RIGHT ARM, RIGHT KNEE.   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    • Obesity    • Poor historian    • Carl Mountain spotted fever    • Seizures ([facility])    • Sleep apnea    • Tattoo    • Wears glasses        Allergies   Allergen Reactions   • Ciprofloxacin Anaphylaxis and Hives   • Miralax [Polyethylene Glycol] Itching and Rash   • Mobic [Meloxicam] Other (See Comments)     Pt states, \"It make my feet and hands go numb and I can't hardly walk.\"    • Paxil [Paroxetine Hcl] Shortness " Of Breath     Chest pain    • Peanut-Containing Drug Products Anaphylaxis   • Penicillins Anaphylaxis   • Pristiq [Desvenlafaxine Succinate Er] Dizziness   • Sulfa Antibiotics Anaphylaxis, Itching and Rash   • Doxycycline Hives   • Fish-Derived Products Hives   • Isosorbide Nitrate Rash     Rash, hives, had to use inhaler.    • Movantik [Naloxegol] Rash   • Buspar [Buspirone] Rash   • Clarithromycin Rash   • Clindamycin/Lincomycin Rash   • Codeine Rash   • Contrast Dye Itching and Rash   • Diltiazem Rash   • Flomax [Tamsulosin] Hives   • Gabapentin Rash   • Keflex [Cephalexin] Rash   • Linzess [Linaclotide] Rash   • Metoprolol Rash   • Prednisone Rash and Other (See Comments)     Face, feet, and legs go completely numb per patient   • Robitussin Cough+ Chest Max St [Dextromethorphan-Guaifenesin] Itching   • Shrimp (Diagnostic) Rash   • Spironolactone Rash   • Viibryd [Vilazodone Hcl] Itching and Rash   • Zoloft [Sertraline Hcl] Hives and Itching       Past Surgical History:   Procedure Laterality Date   • ABDOMINAL SURGERY     • BACK SURGERY     • BRAIN SURGERY  1986    Tumor removal    • CARDIAC CATHETERIZATION N/A 9/28/2018    Procedure: Left Heart Cath;  Surgeon: Leandro Daily MD;  Location: [facility] CATH INVASIVE LOCATION;  Service: Cardiology   • CHOLECYSTECTOMY     • COLONOSCOPY     • COLONOSCOPY N/A 8/2/2021    Procedure: COLONOSCOPY FOR SCREENING;  Surgeon: Irving Azar MD;  Location: [facility] OR;  Service: Gastroenterology;  Laterality: N/A;   • CYST REMOVAL      pilonidal cyst   • ELBOW EPICONDYLECTOMY Right 7/23/2020    Procedure: LATERAL EPICONDYLAR RELEASE;  Surgeon: Jose Ruiz MD;  Location: [facility] OR;  Service: Orthopedics;  Laterality: Right;   • ENDOSCOPY     • ENDOSCOPY N/A 8/2/2021    Procedure: ESOPHAGOGASTRODUODENOSCOPY WITH BIOPSY;  Surgeon: Irving Azar MD;  Location: [facility] OR;  Service: Gastroenterology;  Laterality: N/A;  esophageal dilatation  to 20mm   • FRACTURE SURGERY Right     elbow   • KNEE ARTHROSCOPY Left 10/20/2017    Procedure: Diagnostic arthroscopy left knee with chondroplasty;  Surgeon: Marco Aguirre MD;  Location: [facility] OR;  Service:    • KNEE ARTHROSCOPY Left 1/11/2021    Procedure: KNEE DIAGNOSTIC ARTHROSCOPY WITH  CHONDROPLASTY patella, femoral and medial;  Surgeon: Raul Eagle MD;  Location: [facility] OR;  Service: Orthopedics;  Laterality: Left;   • KNEE SURGERY Right    • MOUTH SURGERY      FULL MOUTH EXTRACTION   • OTHER SURGICAL HISTORY      REPORTS 7 TICKS REMOVED FROM RIGHT ARM IN 2001 OR 2002   • TENNIS ELBOW RELEASE Right 7/23/2020    Procedure: RIGHT TENNIS ELBOW RELEASE;  Surgeon: Jose Ruiz MD;  Location: [facility] OR;  Service: Orthopedics;  Laterality: Right;   • TUMOR EXCISION      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       Family History   Problem Relation Age of Onset   • Diabetes Mother    • Hypertension Mother    • Stroke Mother    • Diabetes Father    • Skin cancer Father    • Hypertension Father    • Heart attack Father    • Diabetes Brother    • Hypertension Brother    • Heart disease Maternal Aunt    • Heart disease Maternal Uncle    • Heart disease Paternal Aunt    • Heart disease Paternal Uncle    • Heart disease Maternal Grandmother    • Heart disease Maternal Grandfather    • Heart disease Paternal Grandmother    • Heart disease Paternal Grandfather        Social History     Socioeconomic History   • Marital status:      Spouse name: Becca   • Number of children: 2   • Years of education: 12   Tobacco Use   • Smoking status: Current Every Day Smoker     Packs/day: 1.50     Years: 17.00     Pack years: 25.50     Types: Cigars, Cigarettes     Start date: 5/5/2010   • Smokeless tobacco: Never Used   • Tobacco comment: still uses 1.5 packs a day.  he has not smoked in approx 3 weeks.   Vaping Use   • Vaping Use: Never used   Substance and Sexual Activity   • Alcohol use: No   • Drug  use: No   • Sexual activity: Defer     Partners: Female     Birth control/protection: None           Objective   Physical Exam  Vitals and nursing note reviewed.   Constitutional:       General: He is not in acute distress.     Appearance: He is well-developed. He is not diaphoretic.   HENT:      Head: Normocephalic and atraumatic.      Right Ear: External ear normal.      Left Ear: External ear normal.      Nose: Nose normal.   Eyes:      Conjunctiva/sclera: Conjunctivae normal.   Neck:      Vascular: No JVD.      Trachea: No tracheal deviation.   Cardiovascular:      Rate and Rhythm: Normal rate.      Heart sounds: No murmur heard.      Pulmonary:      Effort: Pulmonary effort is normal. No respiratory distress.      Breath sounds: No wheezing.   Abdominal:      Palpations: Abdomen is soft.      Tenderness: There is no abdominal tenderness.   Musculoskeletal:         General: Tenderness (left ankle, right wrist) present. No deformity.      Cervical back: Normal range of motion and neck supple.   Skin:     General: Skin is warm and dry.      Coloration: Skin is not pale.      Findings: No erythema or rash.   Neurological:      Mental Status: He is alert and oriented to person, place, and time.      Cranial Nerves: No cranial nerve deficit.   Psychiatric:         Behavior: Behavior normal.         Thought Content: Thought content normal.         Procedures           ED Course  ED Course as of 11/11/21 0157   Thu Nov 11, 2021   0122 XR hand rad interpreted:  Negative right hand. [RB]   0122 XR ankle rad interpreted:  Negative left ankle. [RB]      ED Course User Index  [RB] Rick Mehta II, PA                                           MDM  Number of Diagnoses or Management Options  Hand injury, right, initial encounter: new and requires workup  Injury of left ankle, initial encounter: new and requires workup     Amount and/or Complexity of Data Reviewed  Tests in the radiology section of CPT®: ordered and  reviewed    Risk of Complications, Morbidity, and/or Mortality  Presenting problems: low  Diagnostic procedures: low  Management options: low    Patient Progress  Patient progress: stable      Final diagnoses:   Hand injury, right, initial encounter   Injury of left ankle, initial encounter       ED Disposition  ED Disposition     ED Disposition Condition Comment    Discharge Stable           Tiera Valenzuela, BALDOMERO  [address] [facility] [address]  [phone]    Schedule an appointment as soon as possible for a visit       Allen Marks, DO  [address] [place] [address] [phone]    Schedule an appointment as soon as possible for a visit            Medication List      New Prescriptions    tiZANidine 4 MG tablet  Commonly known as: ZANAFLEX  Take 1 tablet by mouth Every 8 (Eight) Hours As Needed (pain).        Changed    Diclofenac Sodium 1 % gel gel  Commonly known as: VOLTAREN  Apply  topically to the appropriate area as directed 2 (Two) Times a Day.  What changed:   · how to take this  · when to take this        Stop    methocarbamol 750 MG tablet  Commonly known as: ROBAXIN           Where to Get Your Medications      You can get these medications from any pharmacy    Bring a paper prescription for each of these medications  · Diclofenac Sodium 1 % gel gel  · tiZANidine 4 MG tablet          Rick Mehta II, PA  11/11/21 0157

## 2021-11-15 ENCOUNTER — OFFICE VISIT (OUTPATIENT)
Dept: GASTROENTEROLOGY | Facility: CLINIC | Age: 51
End: 2021-11-15

## 2021-11-15 VITALS
HEIGHT: 67 IN | DIASTOLIC BLOOD PRESSURE: 78 MMHG | SYSTOLIC BLOOD PRESSURE: 129 MMHG | WEIGHT: 228.6 LBS | BODY MASS INDEX: 35.88 KG/M2 | HEART RATE: 82 BPM

## 2021-11-15 DIAGNOSIS — R19.7 DIARRHEA, UNSPECIFIED TYPE: ICD-10-CM

## 2021-11-15 DIAGNOSIS — R13.19 ESOPHAGEAL DYSPHAGIA: Primary | ICD-10-CM

## 2021-11-15 PROCEDURE — 99214 OFFICE O/P EST MOD 30 MIN: CPT | Performed by: PHYSICIAN ASSISTANT

## 2021-11-15 RX ORDER — MONTELUKAST SODIUM 4 MG/1
1 TABLET, CHEWABLE ORAL 2 TIMES DAILY
Qty: 60 TABLET | Refills: 11 | Status: SHIPPED | OUTPATIENT
Start: 2021-11-15 | End: 2021-11-15

## 2021-11-15 RX ORDER — MONTELUKAST SODIUM 4 MG/1
1 TABLET, CHEWABLE ORAL 2 TIMES DAILY
Qty: 60 TABLET | Refills: 11 | Status: SHIPPED | OUTPATIENT
Start: 2021-11-15 | End: 2022-02-03

## 2021-11-15 NOTE — PROGRESS NOTES
Chief Complaint   Patient presents with   • Abdominal Pain       Jaquan Mckay is a 50 y.o. male who presents to the office today for evaluation of Abdominal Pain  .    HPI  Patient presents to the clinic today complaining of abdominal pain and difficulty swallowing.  He states back in October he developed Covid 19 and ever since then he has noticed a significant change in his swallowing.  Dr. Azar dilated his esophagus back in August and he did very well after the procedure.  He had no issues swallowing up until having Covid.  He states he is back to feeling as though something is stuck in his cervical esophagus-he has mainly troubles with solids(breads and meats) denies any difficulty swallowing liquids.  He also states since Covid he has developed chronic diarrhea rather than constipation.  He has stopped taking the Amitiza altogether.  He will have several bowel movements daily that are type VI-VII on the Vermillion stool scale.  His PCP gave him cholestyramine which seems to be making somewhat of a difference.  He does generally have a bowel movement after eating any food.  These bowel movements occur with urgency and are at times large volume.  Patient states at times he has not been able to make it to the bathroom and had several accidents.    Review of Systems   Constitutional: Negative.    HENT: Positive for trouble swallowing. Negative for sore throat.    Eyes: Negative.    Respiratory: Negative for chest tightness.    Cardiovascular: Negative for chest pain.   Gastrointestinal: Positive for abdominal pain, diarrhea, nausea and vomiting. Negative for abdominal distention, anal bleeding, blood in stool, constipation and rectal pain.   Endocrine: Negative.    Genitourinary: Negative for difficulty urinating.   Musculoskeletal: Positive for back pain.   Skin: Negative.    Allergic/Immunologic: Positive for environmental allergies and food allergies.   Neurological: Positive for dizziness and headaches.  "  Hematological: Bruises/bleeds easily.   Psychiatric/Behavioral: Positive for sleep disturbance. The patient is nervous/anxious.        ACTIVE PROBLEMS:   Specialty Problems        Gastroenterology Problems    Gastroesophageal reflux disease without esophagitis        Rectal bleeding        Therapeutic opioid-induced constipation (OIC)        Chronic idiopathic constipation              PAST MEDICAL HISTORY:  Past Medical History:   Diagnosis Date   • Allergic    • Anxiety    • Arthritis    • Asthma    • Body piercing     REPORTS CYLICONE IN EARS   • Clotting disorder (HCC) 2004    had a knee surgery   • Coronary artery disease    • Depression    • DVT (deep venous thrombosis) (HCC)     RIGHT RIGHT KNEE AFTER SURGERY YEARS AGO IN 2001 OR 2004   • Elevated cholesterol    • Gastric ulcer    • GERD (gastroesophageal reflux disease)    • H/O migraine    • Headache    • Heart attack (HCC)     REPORTS \"LIGHT HEART ATTACK A LONG TIME AGO\"  \"EARLY 90'S\"   • History of seizures     REPORTS LAST EPISODE WAS AROUND 1995.   • Hostility    • Hyperlipidemia    • Hypertension    • Knee pain, acute     Left   • Low back pain    • Lyme disease    • Migraine    • MRSA (methicillin resistant Staphylococcus aureus)     REPORTS LAST TESTED + 2004. WAS TREATED HE REPORTS.  RIGHT ARM, RIGHT KNEE.   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    • Obesity    • Poor historian    • Carl Mountain spotted fever    • Seizures (HCC)    • Sleep apnea    • Tattoo    • Wears glasses        SURGICAL HISTORY:  Past Surgical History:   Procedure Laterality Date   • ABDOMINAL SURGERY     • BACK SURGERY     • BRAIN SURGERY  1986    Tumor removal    • CARDIAC CATHETERIZATION N/A 9/28/2018    Procedure: Left Heart Cath;  Surgeon: Leandro Daily MD;  Location: [facility] INVASIVE LOCATION;  Service: Cardiology   • CHOLECYSTECTOMY     • COLONOSCOPY     • COLONOSCOPY N/A 8/2/2021    Procedure: COLONOSCOPY FOR SCREENING;  Surgeon: Irving Azar MD;  Location: [facility]" [facility] OR;  Service: Gastroenterology;  Laterality: N/A;   • CYST REMOVAL      pilonidal cyst   • ELBOW EPICONDYLECTOMY Right 7/23/2020    Procedure: LATERAL EPICONDYLAR RELEASE;  Surgeon: Jose Ruiz MD;  Location: [facility] OR;  Service: Orthopedics;  Laterality: Right;   • ENDOSCOPY     • ENDOSCOPY N/A 8/2/2021    Procedure: ESOPHAGOGASTRODUODENOSCOPY WITH BIOPSY;  Surgeon: Irving Azar MD;  Location: [facility] OR;  Service: Gastroenterology;  Laterality: N/A;  esophageal dilatation to 20mm   • FRACTURE SURGERY Right     elbow   • KNEE ARTHROSCOPY Left 10/20/2017    Procedure: Diagnostic arthroscopy left knee with chondroplasty;  Surgeon: Marco Aguirre MD;  Location: [facility] OR;  Service:    • KNEE ARTHROSCOPY Left 1/11/2021    Procedure: KNEE DIAGNOSTIC ARTHROSCOPY WITH  CHONDROPLASTY patella, femoral and medial;  Surgeon: Raul Eagle MD;  Location: [facility] OR;  Service: Orthopedics;  Laterality: Left;   • KNEE SURGERY Right    • MOUTH SURGERY      FULL MOUTH EXTRACTION   • OTHER SURGICAL HISTORY      REPORTS 7 TICKS REMOVED FROM RIGHT ARM IN 2001 OR 2002   • TENNIS ELBOW RELEASE Right 7/23/2020    Procedure: RIGHT TENNIS ELBOW RELEASE;  Surgeon: Jose Ruiz MD;  Location: [facility] OR;  Service: Orthopedics;  Laterality: Right;   • TUMOR EXCISION      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       FAMILY HISTORY:  Family History   Problem Relation Age of Onset   • Diabetes Mother    • Hypertension Mother    • Stroke Mother    • Diabetes Father    • Skin cancer Father    • Hypertension Father    • Heart attack Father    • Diabetes Brother    • Hypertension Brother    • Heart disease Maternal Aunt    • Heart disease Maternal Uncle    • Heart disease Paternal Aunt    • Heart disease Paternal Uncle    • Heart disease Maternal Grandmother    • Heart disease Maternal Grandfather    • Heart disease Paternal Grandmother    • Heart disease Paternal Grandfather        SOCIAL  HISTORY:  Social History     Tobacco Use   • Smoking status: Current Every Day Smoker     Packs/day: 1.50     Years: 17.00     Pack years: 25.50     Types: Cigars, Cigarettes     Start date: 5/5/2010   • Smokeless tobacco: Never Used   • Tobacco comment: still uses 1.5 packs a day.  he has not smoked in approx 3 weeks.   Substance Use Topics   • Alcohol use: No       CURRENT MEDICATION:    Current Outpatient Medications:   •  albuterol (PROVENTIL) (2.5 MG/3ML) 0.083% nebulizer solution, Take 2.5 mg by nebulization Every 4 (Four) Hours As Needed for Shortness of Air., Disp: 180 vial, Rfl: 5  •  albuterol sulfate  (90 Base) MCG/ACT inhaler, Inhale 2 puffs Every 4 (Four) Hours As Needed for Wheezing., Disp: 18 g, Rfl: 5  •  ARIPiprazole (Abilify) 2 MG tablet, Take 1 tablet by mouth Daily., Disp: 30 tablet, Rfl: 0  •  aspirin (Aspirin Adult Low Strength) 81 MG EC tablet, Take 1 tablet by mouth Daily., Disp: 30 tablet, Rfl: 3  •  atorvastatin (LIPITOR) 40 MG tablet, Take 1 tablet by mouth Daily., Disp: 90 tablet, Rfl: 3  •  dexlansoprazole (Dexilant) 60 MG capsule, Take 1 capsule by mouth Daily., Disp: 30 capsule, Rfl: 5  •  Diclofenac Sodium (VOLTAREN) 1 % gel gel, Apply  topically to the appropriate area as directed 2 (Two) Times a Day., Disp: 150 g, Rfl: 0  •  Dilantin 100 MG ER capsule, Take 2 capsules by mouth 2 (Two) Times a Day., Disp: 120 capsule, Rfl: 5  •  diphenhydrAMINE (Benadryl Allergy) 25 MG tablet, Take 1 tablet by mouth Every 6 (Six) Hours As Needed for Itching or Allergies., Disp: 30 tablet, Rfl: 1  •  famotidine (Pepcid) 40 MG tablet, Take 1 tablet by mouth Every Night., Disp: 30 tablet, Rfl: 5  •  fluticasone (Flovent HFA) 220 MCG/ACT inhaler, Inhale 1 puff 2 (Two) Times a Day., Disp: 12 g, Rfl: 11  •  HYDROcodone-acetaminophen (NORCO) 5-325 MG per tablet, Take 1 tablet by mouth Every 8 (Eight) Hours As Needed for Moderate Pain ., Disp: 9 tablet, Rfl: 0  •  lisinopril (PRINIVIL,ZESTRIL) 30 MG  tablet, Take 1 tablet by mouth Daily., Disp: 30 tablet, Rfl: 5  •  loratadine (CLARITIN) 10 MG tablet, Take 1 tablet by mouth Daily As Needed for Allergies., Disp: 30 tablet, Rfl: 5  •  lubiprostone (Amitiza) 8 MCG capsule, Take 1 capsule by mouth 2 (Two) Times a Day With Meals., Disp: 30 capsule, Rfl: 11  •  magnesium citrate 1.745 GM/30ML solution solution, Take 296 mL by mouth Take As Directed for 2 doses. Take one full bottle at 4:00 PM and take second bottle at 10:00 PM., Disp: 592 mL, Rfl: 0  •  mirtazapine (REMERON) 30 MG tablet, Take 1.5 tablets by mouth Every Night. 1 tablet 2-3 hours before bedtime, Disp: 45 tablet, Rfl: 5  •  Misc. Devices (BATH/SHOWER SEAT) misc, 1 each Daily., Disp: 1 each, Rfl: 0  •  montelukast (SINGULAIR) 10 MG tablet, Take 1 tablet by mouth Every Night., Disp: 30 tablet, Rfl: 5  •  mupirocin (BACTROBAN) 2 % ointment, Apply  topically to the appropriate area as directed 3 (Three) Times a Day., Disp: 30 g, Rfl: 0  •  nystatin (MYCOSTATIN) 059746 UNIT/GM ointment, Apply 1 application topically to the appropriate area as directed 2 (Two) Times a Day. To skin irritation on buttock, Disp: 60 g, Rfl: 2  •  ondansetron (Zofran) 4 MG tablet, Take 1 tablet by mouth Every 8 (Eight) Hours As Needed for Nausea., Disp: 20 tablet, Rfl: 0  •  phenazopyridine (Pyridium) 200 MG tablet, Take 1 tablet by mouth 3 (Three) Times a Day As Needed for Bladder Spasms., Disp: 30 tablet, Rfl: 0  •  phentermine (Adipex-P) 37.5 MG tablet, Take 1 tablet by mouth Every Morning Before Breakfast., Disp: 30 tablet, Rfl: 0  •  polycarbophil (calcium polycarbophil) 625 MG tablet tablet, Take 1 tablet by mouth 2 (Two) Times a Day., Disp: 60 tablet, Rfl: 11  •  potassium chloride 10 MEQ CR tablet, Take 1 tablet by mouth Daily., Disp: 30 tablet, Rfl: 5  •  promethazine-dextromethorphan (PROMETHAZINE-DM) 6.25-15 MG/5ML syrup, Take 5 mL by mouth 4 (Four) Times a Day As Needed for Cough., Disp: 180 mL, Rfl: 0  •  tamsulosin  "(FLOMAX) 0.4 MG capsule 24 hr capsule, , Disp: , Rfl:   •  tiZANidine (ZANAFLEX) 4 MG tablet, Take 1 tablet by mouth Every 8 (Eight) Hours As Needed (pain)., Disp: 30 tablet, Rfl: 0  •  vitamin D (ERGOCALCIFEROL) 1.25 MG (74317 UT) capsule capsule, Take 1 capsule by mouth Every 7 (Seven) Days. On Friday, Disp: 5 capsule, Rfl: 5  •  colestipol (COLESTID) 1 g tablet, Take 1 tablet by mouth 2 (Two) Times a Day., Disp: 60 tablet, Rfl: 11    ALLERGIES:  Ciprofloxacin, Miralax [polyethylene glycol], Mobic [meloxicam], Paxil [paroxetine hcl], Peanut-containing drug products, Penicillins, Pristiq [desvenlafaxine succinate er], Sulfa antibiotics, Doxycycline, Fish-derived products, Isosorbide nitrate, Movantik [naloxegol], Buspar [buspirone], Clarithromycin, Clindamycin/lincomycin, Codeine, Contrast dye, Diltiazem, Flomax [tamsulosin], Gabapentin, Keflex [cephalexin], Linzess [linaclotide], Metoprolol, Prednisone, Robitussin cough+ chest max st [dextromethorphan-guaifenesin], Shrimp (diagnostic), Spironolactone, Viibryd [vilazodone hcl], and Zoloft [sertraline hcl]    VISIT VITALS:  /78 (BP Location: Left arm, Patient Position: Sitting, Cuff Size: Adult)   Pulse 82   Ht 170.2 cm (67\")   Wt 104 kg (228 lb 9.6 oz)   BMI 35.80 kg/m²   Physical Exam  Constitutional:       General: He is not in acute distress.     Appearance: Normal appearance. He is well-developed.   HENT:      Head: Normocephalic and atraumatic.   Eyes:      Pupils: Pupils are equal, round, and reactive to light.   Cardiovascular:      Rate and Rhythm: Normal rate and regular rhythm.      Heart sounds: Normal heart sounds.   Pulmonary:      Effort: Pulmonary effort is normal. No respiratory distress.      Breath sounds: Normal breath sounds. No wheezing, rhonchi or rales.   Abdominal:      General: Abdomen is flat. Bowel sounds are normal. There is no distension.      Palpations: Abdomen is soft. There is no mass.      Tenderness: There is no " abdominal tenderness. There is no guarding or rebound.      Hernia: No hernia is present.   Musculoskeletal:         General: No swelling. Normal range of motion.      Cervical back: Normal range of motion and neck supple.      Right lower leg: No edema.      Left lower leg: No edema.   Skin:     General: Skin is warm and dry.   Neurological:      Mental Status: He is alert and oriented to person, place, and time.   Psychiatric:         Attention and Perception: Attention normal.         Mood and Affect: Mood normal.         Speech: Speech normal.         Behavior: Behavior normal. Behavior is cooperative.         Thought Content: Thought content normal.           Assessment/Plan   Due to the patient's symptoms, I am going to go ahead and get him scheduled for a repeat EGD with dilation with Dr. Rodass.  The procedure was thoroughly explained to him and he voiced understanding and agreement to the treatment plan.  He is also having chronic diarrhea which could be linked to where he had his gallbladder removed-cholestyramine seems to be helping somewhat.  I will try him on a Colestid 1 g twice daily trial to see if that helps that she was instructed to take 1 tablet daily for a week to ensure it does not cause any constipation.   Diagnosis Plan   1. Esophageal dysphagia  Case Request    Case Request    COVID PRE-OP / PRE-PROCEDURE SCREENING ORDER (NO ISOLATION) - Swab, Nasopharynx   2. Diarrhea, unspecified type  colestipol (COLESTID) 1 g tablet       Return After procedure.                   This document has been electronically signed by Marisela Nix PA-C  November 16, 2021 13:55 EST    Part of this note may be an electronic transcription/translation of spoken language to printed text using the Dragon Dictation System.

## 2021-11-18 ENCOUNTER — OFFICE VISIT (OUTPATIENT)
Dept: FAMILY MEDICINE CLINIC | Facility: CLINIC | Age: 51
End: 2021-11-18

## 2021-11-18 VITALS
HEIGHT: 67 IN | TEMPERATURE: 97.3 F | OXYGEN SATURATION: 99 % | HEART RATE: 100 BPM | DIASTOLIC BLOOD PRESSURE: 72 MMHG | WEIGHT: 228 LBS | BODY MASS INDEX: 35.79 KG/M2 | SYSTOLIC BLOOD PRESSURE: 118 MMHG

## 2021-11-18 DIAGNOSIS — M25.562 CHRONIC PAIN OF LEFT KNEE: ICD-10-CM

## 2021-11-18 DIAGNOSIS — G89.29 CHRONIC PAIN OF LEFT KNEE: ICD-10-CM

## 2021-11-18 DIAGNOSIS — K21.9 GASTROESOPHAGEAL REFLUX DISEASE WITHOUT ESOPHAGITIS: ICD-10-CM

## 2021-11-18 DIAGNOSIS — R29.6 RECURRENT FALLS: ICD-10-CM

## 2021-11-18 DIAGNOSIS — F32.A MOOD DISORDER OF DEPRESSED TYPE: Primary | ICD-10-CM

## 2021-11-18 PROCEDURE — 99214 OFFICE O/P EST MOD 30 MIN: CPT | Performed by: NURSE PRACTITIONER

## 2021-11-18 RX ORDER — QUETIAPINE FUMARATE 50 MG/1
TABLET, FILM COATED ORAL
Qty: 30 TABLET | Refills: 5 | Status: SHIPPED | OUTPATIENT
Start: 2021-11-18 | End: 2021-12-20

## 2021-11-18 RX ORDER — CHOLESTYRAMINE 4 G/9G
POWDER, FOR SUSPENSION ORAL
COMMUNITY
Start: 2021-10-28 | End: 2021-11-18 | Stop reason: ALTCHOICE

## 2021-11-18 NOTE — PROGRESS NOTES
"Subjective   Jaquan Mckay is a 51 y.o. male.     Chief Complaint   Patient presents with   • Depression       History of Present Illness     Depression and insomnia-started on Abilify.  He reports \"made me worse\".  He reports it does not help him sleep.  He took medication since he picked them up.  He reports he has tried to talk to his  for support.  He reports he has gone 3 days this week without sleep.  He is seeing his therapist at least Q 2 weeks.  He does struggle with depression more In the winter months is that is around the time that his wife passed away.  ER follow up-from 11/11/2021.  After a fall.  Injured his right hand and left ankle.  Imaging negative.  Discharged with zanaflex and voltaren.   He reports he is doing \"ok\" but his ankle is continuing to be very sore.   GI visit follow up-from 11/15/2021.  Worsening dysphagia after covid and diarrhea continued.  Cholestyamine 4 mg was stopped due to minimal effect and colestipol was started.  He will referred to Dr oneill for repeat EGD as he required dilation of esophagus in the past.   He will have to wait to have done for a few months.   Audiology follow up-reports he will have to go to [place] to see a doctor to make the decision if he needs hearing aides.  Reports left ear is worse.  He reports he had multiple tests. He reports he was advised \"my ear was really bright red\".    Left knee-is getting injections in his knee.  He will take the last one on 11/24/2021.  He has had collagen in the past.  He feels his leg is giving out and weaker.  He is falling more.      The following portions of the patient's history were reviewed and updated as appropriate: CC, ROS, allergies, current medications, past family history, past medical history, past social history, past surgical history and problem list.      Review of Systems   Constitutional: Positive for fatigue. Negative for appetite change, unexpected weight gain and unexpected weight loss. " "  HENT: Negative for congestion, ear pain, postnasal drip, rhinorrhea, sore throat, swollen glands, trouble swallowing and voice change.    Eyes: Negative for blurred vision, double vision, pain and visual disturbance.   Respiratory: Negative for cough, chest tightness, shortness of breath and wheezing.    Cardiovascular: Negative for chest pain, palpitations and leg swelling.   Gastrointestinal: Negative for abdominal pain, blood in stool, constipation, diarrhea, nausea and indigestion.   Genitourinary: Negative for dysuria, hematuria and urgency.   Musculoskeletal: Positive for arthralgias, back pain, gait problem and myalgias. Negative for joint swelling.   Skin: Negative for color change and skin lesions.   Allergic/Immunologic: Negative.    Neurological: Negative for numbness, headache and confusion.   Hematological: Negative.    Psychiatric/Behavioral: Positive for decreased concentration, dysphoric mood, sleep disturbance and stress. Negative for suicidal ideas. The patient is nervous/anxious.    All other systems reviewed and are negative.      Objective     /72 (BP Location: Left arm, Patient Position: Sitting, Cuff Size: Adult)   Pulse 100   Temp 97.3 °F (36.3 °C) (Temporal)   Ht 170.2 cm (67\")   Wt 103 kg (228 lb)   SpO2 99%   BMI 35.71 kg/m²     Physical Exam  Vitals reviewed.   Constitutional:       General: He is not in acute distress.     Appearance: He is well-developed. He is obese. He is not diaphoretic.   HENT:      Head: Normocephalic and atraumatic.      Jaw: No tenderness.      Comments: Oropharynx not examined.  Patient is presently wearing a face covering/mask due to COVID-19 pandemic.     Right Ear: Hearing, tympanic membrane, ear canal and external ear normal.      Left Ear: Hearing, tympanic membrane, ear canal and external ear normal.   Eyes:      General: Lids are normal. No scleral icterus.     Extraocular Movements:      Right eye: Normal extraocular motion and no " nystagmus.      Left eye: Normal extraocular motion and no nystagmus.      Conjunctiva/sclera: Conjunctivae normal.      Pupils: Pupils are equal, round, and reactive to light.   Neck:      Thyroid: No thyromegaly or thyroid tenderness.      Vascular: No carotid bruit or JVD.      Trachea: No tracheal tenderness.   Cardiovascular:      Rate and Rhythm: Normal rate and regular rhythm.      Pulses:           Dorsalis pedis pulses are 2+ on the right side and 2+ on the left side.        Posterior tibial pulses are 2+ on the right side and 2+ on the left side.      Heart sounds: Normal heart sounds, S1 normal and S2 normal. No murmur heard.      Pulmonary:      Effort: Pulmonary effort is normal. No accessory muscle usage, prolonged expiration or respiratory distress.      Breath sounds: Normal breath sounds.   Chest:      Chest wall: No tenderness.   Abdominal:      General: Bowel sounds are normal.      Palpations: Abdomen is soft. There is no mass.      Tenderness: There is no abdominal tenderness.   Musculoskeletal:      Right hand: Tenderness present. No swelling or deformity. Decreased range of motion. Decreased strength. Normal capillary refill. Normal pulse.      Cervical back: Normal range of motion and neck supple.      Thoracic back: Tenderness present.      Lumbar back: Tenderness present.      Right upper leg: Bony tenderness present.      Left upper leg: Bony tenderness present.      Right lower leg: No edema.      Left lower leg: No edema.      Left ankle: No swelling or ecchymosis. Tenderness present. Decreased range of motion. Normal pulse.      Comments: No muscular atrophy or flaccidity.   Lymphadenopathy:      Head:      Right side of head: No submental or submandibular adenopathy.      Left side of head: No submental or submandibular adenopathy.      Cervical: No cervical adenopathy.      Right cervical: No superficial cervical adenopathy.     Left cervical: No superficial cervical adenopathy.    Skin:     General: Skin is warm and dry.      Capillary Refill: Capillary refill takes less than 2 seconds.      Coloration: Skin is not jaundiced or pale.      Findings: No erythema.      Nails: There is no clubbing.   Neurological:      Mental Status: He is alert and oriented to person, place, and time.      Cranial Nerves: No cranial nerve deficit or facial asymmetry.      Sensory: No sensory deficit.      Motor: No tremor, atrophy or abnormal muscle tone.      Coordination: Coordination normal.      Deep Tendon Reflexes: Reflexes are normal and symmetric.   Psychiatric:         Attention and Perception: He is attentive.         Mood and Affect: Mood is anxious and depressed. Affect is flat.         Speech: Speech normal.         Behavior: Behavior normal. Behavior is cooperative.         Thought Content: Thought content normal. Thought content is not paranoid. Thought content does not include homicidal or suicidal ideation.         Cognition and Memory: Cognition normal.         Judgment: Judgment normal.       Assessment/Plan     Diagnoses and all orders for this visit:    1. Mood disorder of depressed type (Primary)  Comments:  Stop Abilify.  Trial of Seroquel to see if depression and insomnia are improved.Continue under the care of therapy  Orders:  -     QUEtiapine (SEROquel) 50 MG tablet; 1 tablet 2-3 hours before bedtime for sleep and depression  Dispense: 30 tablet; Refill: 5    2. Chronic pain of left knee  -     Ambulatory Referral to Physical Therapy Evaluate and treat    3. Recurrent falls  Comments:  No referral to PT today.  Continue under the care of Ortho for knee concerns  Orders:  -     Ambulatory Referral to Physical Therapy Evaluate and treat    4. Gastroesophageal reflux disease without esophagitis  Assessment & Plan:  Continue under the care of GI.  Keep any appointment with GI provider for possible EGD  Encourage bland foods.         Patient's Body mass index is 35.71 kg/m². indicating  that he is morbidly obese (BMI > 40 or > 35 with obesity - related health condition). Obesity-related health conditions include the following: hypertension, GERD and osteoarthritis. Obesity is unchanged. BMI is Is above average. We discussed portion control and increasing exercise    Understands disease processes and need for medications.  Understands reasons for urgent and emergent care.  Patient (& family) verbalized agreement for treatment plan.   Emotional support and active listening provided.  Patient provided time to verbalize feelings.      RTC 2-3 weeks for re-eval, sooner for worsening of symptoms.             This document has been electronically signed by:  BALDOMERO Thao, FNP-C    Dragon disclaimer:  Part of this note may be an electronic transcription/translation of spoken language to printed text using the Dragon Dictation System.

## 2021-12-13 ENCOUNTER — TRANSCRIBE ORDERS (OUTPATIENT)
Dept: PHYSICAL THERAPY | Facility: CLINIC | Age: 51
End: 2021-12-13

## 2021-12-13 DIAGNOSIS — M77.02 EPICONDYLITIS ELBOW, MEDIAL, LEFT: Primary | ICD-10-CM

## 2021-12-15 ENCOUNTER — TREATMENT (OUTPATIENT)
Dept: PHYSICAL THERAPY | Facility: CLINIC | Age: 51
End: 2021-12-15

## 2021-12-15 DIAGNOSIS — M77.12 LATERAL EPICONDYLITIS OF LEFT ELBOW: ICD-10-CM

## 2021-12-15 DIAGNOSIS — M25.522 LEFT ELBOW PAIN: Primary | ICD-10-CM

## 2021-12-15 PROCEDURE — 97162 PT EVAL MOD COMPLEX 30 MIN: CPT | Performed by: PHYSICAL THERAPIST

## 2021-12-15 NOTE — PROGRESS NOTES
"    Physical Therapy Initial Evaluation and Plan of Care    Patient: Jaquan Mckay   : 1970  Diagnosis/ICD-10 Code:  Left elbow pain [M25.522]  Referring practitioner: Ronald S Dubin, MD  Date of Initial Visit: 12/15/2021  Today's Date: 12/15/2021  Patient seen for 1 session         Visit Diagnoses:    ICD-10-CM ICD-9-CM   1. Left elbow pain  M25.522 719.42   2. Lateral epicondylitis of left elbow  M77.12 726.32         Subjective Questionnaire: QuickDASH: 68.18% impaired      Subjective Evaluation    History of Present Illness  Onset date: over 1 year ago.  Mechanism of injury: Patient reports that he has been experiencing left elbow pain for over 1 year.  He is unable to recall any specific mechanism of injury, but notes that he has had several falls in the past year and fell on the left elbow.  Patient notes that he experiences increased pain with elbow ROM.  He also notes weakness in the hand when grasping items.  He reports complete numbness in the 2nd and 3rd digits and partial numbness in the 1st digit.  He states that when he bends his elbow, it shoots \"electricity\" up the arm into the biceps; when he straightens the elbow, he reports the \"electricity\" goes up and down the arm.  Patient reports that he has been seeing an orthopedic and had received a shot, but notes that the shot did not help.        Patient Occupation: Unemployed Pain  Current pain ratin  At best pain ratin  At worst pain rating: 10  Location: L) elbow  Quality: radiating, knife-like, cramping and sharp  Relieving factors: ice, heat and rest  Aggravating factors: movement, lifting, overhead activity, outstretched reach, repetitive movement and sleeping    Hand dominance: right    Diagnostic Tests  Abnormal x-ray: patient unsure of results.    Patient Goals  Patient goals for therapy: decreased pain             Objective          Palpation   Left   No palpable tenderness to the biceps.   Tenderness of the biceps, brachialis, " brachioradialis, pronator teres, supinator, triceps, wrist extensors and wrist flexors.     Tenderness     Left Elbow   Tenderness in the lateral epicondyle, medial epicondyle and olecranon process.     Left Wrist/Hand   Tenderness in the lateral epicondyle, medial epicondyle and olecranon process.     Active Range of Motion     Left Elbow   Flexion: 68 degrees with pain  Extension: Left elbow active extension: lacking 24 degrees from full extension. with pain  Forearm supination: 20 degrees with pain  Forearm pronation: WFL    Strength/Myotome Testing     Left Elbow   Flexion: 4-  Extension: 4-    Left Wrist/Hand      (2nd hand position)    Trial 1: 57 lbs    Trial 2: 70 lbs    Trial 3: 67 lbs    Average: 64.67 lbs    Right Wrist/Hand      (2nd hand position)     Trial 1: 87 lbs    Trial 2: 95 lbs    Trial 3: 90 lbs    Average: 90.67 lbs    Tests     Left Elbow   Positive Cozen's, Maudsley's and Mill's.   Negative valgus stress at 30 degrees and varus stress at 30 degrees.           Assessment & Plan     Assessment  Impairments: abnormal or restricted ROM, activity intolerance, impaired physical strength, lacks appropriate home exercise program and pain with function  Functional Limitations: carrying objects, sleeping, pulling, pushing, uncomfortable because of pain, reaching behind back, reaching overhead and unable to perform repetitive tasks  Assessment details: Patient is a 51 year old male who comes to physical therapy for left elbow pain. The patient presents with increased pain, decreased left elbow ROM, and decreased left UE strength. Special tests were positive at the left elbow, indicating lateral epicondylitis.   Patient reports a 68.18%% functional mobility impairment, based on the patient's response to the QuickDash.  Patient will benefit from skilled PT, so that patient can achieve maximum level of function.     Prognosis: good    Goals  Plan Goals: STGs: 4 weeks  1. Patient will be  independent/compliant with HEP.  2. Patient will report pain no greater than 6/10 when performing self-care activities.  3. Left elbow AROM will improve to at least 10-90 to allow for greater ease with daily tasks.    LTGs: 8 weeks  1. Patient will improve left  strength to at least 80# to allow for greater ease with opening items.   2. Left elbow AROM will improve to WFL to allow for greater ease with daily tasks.  3. Patient will report left elbow pain no greater than 4/10 with moderate lifting.    Plan  Therapy options: will be seen for skilled therapy services  Planned modality interventions: cryotherapy, iontophoresis, electrical stimulation/Russian stimulation, TENS, thermotherapy (hydrocollator packs) and ultrasound  Planned therapy interventions: manual therapy, ADL retraining, neuromuscular re-education, body mechanics training, postural training, soft tissue mobilization, flexibility, functional ROM exercises, strengthening, stretching, home exercise program, therapeutic activities, IADL retraining, transfer training and joint mobilization  Frequency: 2x week  Duration in weeks: 8  Treatment plan discussed with: patient  Plan details: Moderate Evaluation  43492  Re-evaluation   09413    Therapeutic exercise  72040  Therapeutic activity    01612  Neuromuscular re-education   92561  Manual therapy   92171    Unattended e-stim (Medicaid/Medicare)     Moist heat/cryotherapy 54551   Ultrasound   54265  Iontophoresis   81451            History # of Personal Factors and/or Comorbidities: MODERATE (1-2)  Examination of Body System(s): # of elements: MODERATE (3)  Clinical Presentation: STABLE   Clinical Decision Making: MODERATE      Timed:         Manual Therapy:         mins  16057;     Therapeutic Exercise:         mins  72472;     Neuromuscular Jazmin:        mins  76478;    Therapeutic Activity:          mins  92359;     Gait Training:           mins  30915;     Ultrasound:          mins  28859;     Ionto                                   mins   83359  Self Care                            mins   24636  Canalith Repos         mins 94682      Un-Timed:  Electrical Stimulation:         mins  28818 ( );  Dry Needling          mins self-pay  Traction          mins 11446  Low Eval          Mins  69002  Mod Eval    33      Mins  05890  High Eval                            Mins  10521        Timed Treatment:      mins   Total Treatment:     33   mins          PT: Hanna Burks, PT     License Number: [id number]  Electronically signed by Hanna Burks, PT, 12/15/21, 1:34 PM EST    Certification Period: 12/15/2021 thru 3/14/2022  I certify that the therapy services are furnished while this patient is under my care.  The services outlined above are required by this patient, and will be reviewed every 90 days.         Physician Signature:__________________________________________________    PHYSICIAN: Dubin, Ronald S, MD      DATE:     Please sign and return via fax to [email or web address] . Thank you, [facility].

## 2021-12-16 DIAGNOSIS — J30.1 SEASONAL ALLERGIC RHINITIS DUE TO POLLEN: ICD-10-CM

## 2021-12-16 DIAGNOSIS — J45.20 MILD INTERMITTENT ASTHMA WITHOUT COMPLICATION: ICD-10-CM

## 2021-12-16 RX ORDER — POTASSIUM CHLORIDE 750 MG/1
10 TABLET, FILM COATED, EXTENDED RELEASE ORAL DAILY
Qty: 30 TABLET | Refills: 5 | Status: SHIPPED | OUTPATIENT
Start: 2021-12-16 | End: 2022-02-03

## 2021-12-16 RX ORDER — LORATADINE 10 MG/1
10 TABLET ORAL DAILY PRN
Qty: 30 TABLET | Refills: 5 | Status: SHIPPED | OUTPATIENT
Start: 2021-12-16 | End: 2022-02-03

## 2021-12-16 RX ORDER — MONTELUKAST SODIUM 10 MG/1
10 TABLET ORAL NIGHTLY
Qty: 30 TABLET | Refills: 5 | Status: SHIPPED | OUTPATIENT
Start: 2021-12-16 | End: 2022-02-03

## 2021-12-20 ENCOUNTER — OFFICE VISIT (OUTPATIENT)
Dept: FAMILY MEDICINE CLINIC | Facility: CLINIC | Age: 51
End: 2021-12-20

## 2021-12-20 VITALS
SYSTOLIC BLOOD PRESSURE: 120 MMHG | DIASTOLIC BLOOD PRESSURE: 74 MMHG | HEIGHT: 67 IN | WEIGHT: 230 LBS | BODY MASS INDEX: 36.1 KG/M2 | TEMPERATURE: 98.6 F | HEART RATE: 100 BPM | OXYGEN SATURATION: 97 %

## 2021-12-20 DIAGNOSIS — G40.909 SEIZURE DISORDER (HCC): Primary | ICD-10-CM

## 2021-12-20 DIAGNOSIS — F32.A ANXIETY AND DEPRESSION: ICD-10-CM

## 2021-12-20 DIAGNOSIS — E55.9 VITAMIN D DEFICIENCY: ICD-10-CM

## 2021-12-20 DIAGNOSIS — J06.9 ACUTE URI: ICD-10-CM

## 2021-12-20 DIAGNOSIS — F41.9 ANXIETY AND DEPRESSION: ICD-10-CM

## 2021-12-20 DIAGNOSIS — K21.9 GASTROESOPHAGEAL REFLUX DISEASE WITHOUT ESOPHAGITIS: ICD-10-CM

## 2021-12-20 DIAGNOSIS — J45.30 MILD PERSISTENT ASTHMA WITHOUT COMPLICATION: ICD-10-CM

## 2021-12-20 DIAGNOSIS — K59.04 CHRONIC IDIOPATHIC CONSTIPATION: ICD-10-CM

## 2021-12-20 PROCEDURE — 99214 OFFICE O/P EST MOD 30 MIN: CPT | Performed by: NURSE PRACTITIONER

## 2021-12-20 RX ORDER — AZITHROMYCIN 250 MG/1
TABLET, FILM COATED ORAL
Qty: 6 TABLET | Refills: 0 | Status: SHIPPED | OUTPATIENT
Start: 2021-12-20 | End: 2022-01-19

## 2021-12-20 RX ORDER — FAMOTIDINE 40 MG/1
40 TABLET, FILM COATED ORAL NIGHTLY
Qty: 30 TABLET | Refills: 5 | Status: SHIPPED | OUTPATIENT
Start: 2021-12-20 | End: 2022-02-17 | Stop reason: SDUPTHER

## 2021-12-20 RX ORDER — POTASSIUM CHLORIDE 750 MG/1
TABLET, EXTENDED RELEASE ORAL
COMMUNITY
Start: 2021-12-16 | End: 2022-01-19 | Stop reason: SDUPTHER

## 2021-12-20 RX ORDER — LUBIPROSTONE 8 UG/1
8 CAPSULE ORAL 2 TIMES DAILY WITH MEALS
Qty: 16 CAPSULE | Refills: 0 | COMMUNITY
Start: 2021-12-20 | End: 2022-02-03

## 2021-12-20 RX ORDER — DEXTROMETHORPHAN HYDROBROMIDE AND PROMETHAZINE HYDROCHLORIDE 15; 6.25 MG/5ML; MG/5ML
5 SYRUP ORAL 4 TIMES DAILY PRN
Qty: 180 ML | Refills: 0 | Status: SHIPPED | OUTPATIENT
Start: 2021-12-20 | End: 2022-01-19

## 2021-12-20 RX ORDER — DEXLANSOPRAZOLE 60 MG/1
60 CAPSULE, DELAYED RELEASE ORAL DAILY
Qty: 30 CAPSULE | Refills: 5 | Status: SHIPPED | OUTPATIENT
Start: 2021-12-20 | End: 2022-04-20 | Stop reason: SDUPTHER

## 2021-12-20 RX ORDER — ERGOCALCIFEROL 1.25 MG/1
50000 CAPSULE ORAL
Qty: 5 CAPSULE | Refills: 5 | Status: SHIPPED | OUTPATIENT
Start: 2021-12-20 | End: 2022-02-03

## 2021-12-20 NOTE — ASSESSMENT & PLAN NOTE
Continue under the care of GI.  Keep any appointment with GI provider for possible EGD  Encourage bland foods.

## 2021-12-20 NOTE — ASSESSMENT & PLAN NOTE
Sample of Amitiza given.  Cautioned patient against going multiple days without having a BM as that is not his usual pattern.  Increase fiber  Push fluids

## 2021-12-20 NOTE — ASSESSMENT & PLAN NOTE
Continue Dilantin as directed.  Continue to work on stress reduction.  Report any increase in symptoms

## 2021-12-20 NOTE — ASSESSMENT & PLAN NOTE
Continue as needed albuterol.  Continue Flovent as directed.  Continue to tobacco cessation status

## 2021-12-20 NOTE — PROGRESS NOTES
"Subjective   Jaquan Mckay is a 51 y.o. male.     Chief Complaint   Patient presents with   • Depression       History of Present Illness     Depression-on Seroquel at present.  He reports \"I can't take\".  He reports \"broke out\" and had hives.  He reports  He took 2 doses.   He reports he is uninterested in a trial of different meds.  He reports that he will wait and see if he has some improvement after the holidays are over.   Sinus pressure and cough-for \"about a week\".   He reports that he is having sinus pressure and cough.  He has not had fever.  PND is present with sore throat.  He has been using cough drops at home.  He reports his left ear is hurting.   Cough is not productive.   Fall-reports he fell at [employer] last week.  Reports he went to the restroom and his feet went numb and he went down.  He reports that he bruised his right lower leg and twisted his left ankle.  He reports he is having some shooting right calf pain and pain in his left heel when tries to walk.  He reports that his achilles region on the left is very sore.  He did not hit his head.   Seizure disorder-on Dilantin.   No seizure activity that patient is aware of.  No negative side effects of meds.   Asthma-doing well.  Taking singulair 10 mg.  He reports that he has stopped smoking in may.  He reports that his breathing is better and his endurance to walk has improved.    Left elbow concern-has been seen by [facility] at Dr Dubin's office.  Reports \"tendonitis\".  Injection in elbow did not help.  He will go back for further evaluation approx the 27th and will possibly be rescheduled for surgery.      The following portions of the patient's history were reviewed and updated as appropriate: CC, ROS, allergies, current medications, past family history, past medical history, past social history, past surgical history and problem list.      Review of Systems   Constitutional: Positive for fatigue. Negative for activity change, appetite change and " "fever.   HENT: Positive for ear pain, rhinorrhea, sinus pressure and sore throat. Negative for congestion, facial swelling, nosebleeds and trouble swallowing.    Eyes: Negative for blurred vision, double vision and redness.   Respiratory: Positive for cough. Negative for chest tightness, shortness of breath and wheezing.    Cardiovascular: Negative for chest pain, palpitations and leg swelling.   Gastrointestinal: Negative for abdominal pain, blood in stool, constipation and diarrhea.   Endocrine: Negative.    Genitourinary: Negative for dysuria, flank pain, frequency, hematuria and urgency.   Musculoskeletal: Positive for arthralgias, back pain and myalgias.   Skin: Negative.    Allergic/Immunologic: Negative.    Neurological: Positive for dizziness. Negative for weakness, light-headedness and headache.   Hematological: Negative.    Psychiatric/Behavioral: Positive for dysphoric mood, sleep disturbance and stress. Negative for self-injury and suicidal ideas. The patient is not nervous/anxious.    All other systems reviewed and are negative.      Objective     /74   Pulse 100   Temp 98.6 °F (37 °C)   Ht 170.2 cm (67.01\")   Wt 104 kg (230 lb)   SpO2 97%   BMI 36.01 kg/m²     Physical Exam  Assessment/Plan     Diagnoses and all orders for this visit:    1. Seizure disorder (HCC) (Primary)  Assessment & Plan:  Continue Dilantin as directed.  Continue to work on stress reduction.  Report any increase in symptoms      2. Gastroesophageal reflux disease without esophagitis  Assessment & Plan:  Continue under the care of GI.  Keep any appointment with GI provider for possible EGD  Encourage bland foods.    Orders:  -     dexlansoprazole (Dexilant) 60 MG capsule; Take 1 capsule by mouth Daily.  Dispense: 30 capsule; Refill: 5  -     famotidine (Pepcid) 40 MG tablet; Take 1 tablet by mouth Every Night.  Dispense: 30 tablet; Refill: 5    3. Vitamin D deficiency  Comments:  Refill of medication today.  Orders:  -   "   vitamin D (ERGOCALCIFEROL) 1.25 MG (46334 UT) capsule capsule; Take 1 capsule by mouth Every 7 (Seven) Days. On Friday  Dispense: 5 capsule; Refill: 5    4. Acute URI  Comments:  Finish all of antibiotics.  Orders:  -     promethazine-dextromethorphan (PROMETHAZINE-DM) 6.25-15 MG/5ML syrup; Take 5 mL by mouth 4 (Four) Times a Day As Needed for Cough.  Dispense: 180 mL; Refill: 0  -     azithromycin (Zithromax) 250 MG tablet; Take 2 tablets the first day, then 1 tablet daily for 4 days.  Dispense: 6 tablet; Refill: 0    5. Chronic idiopathic constipation  Overview:  Added automatically from request for surgery [id number]    Assessment & Plan:  Sample of Amitiza given.  Cautioned patient against going multiple days without having a BM as that is not his usual pattern.  Increase fiber  Push fluids    Orders:  -     lubiprostone (Amitiza) 8 MCG capsule; Take 1 capsule by mouth 2 (Two) Times a Day With Meals. Lot [id number] exp 11/2022  Dispense: 16 capsule; Refill: 0    6. Anxiety and depression  Comments:  Supportive care for now not been able to tolerate medications.  Continue under the care of therapy    7. Mild persistent asthma without complication  Assessment & Plan:  Continue as needed albuterol.  Continue Flovent as directed.  Continue to tobacco cessation status           Patient's Body mass index is 36.01 kg/m². indicating that he is morbidly obese (BMI > 40 or > 35 with obesity - related health condition). Obesity-related health conditions include the following: hypertension, dyslipidemias, GERD and osteoarthritis. Obesity is unchanged. BMI is is above average. We discussed portion control and increasing exercise..     Understands disease processes and need for medications.  Understands reasons for urgent and emergent care.  Patient (& family) verbalized agreement for treatment plan.   Emotional support and active listening provided.  Patient provided time to verbalize feelings.  Patient will return to the  clinic after acute illness has resolved to obtain flu vaccine.    Continue under the care of multiple specialists for Ortho, gastro, and psych    RTC 2 to 3 months, sooner if needed for problems or concerns          This document has been electronically signed by:  BALDOMERO Thao, ROSIEC    Dragon disclaimer:  Part of this note may be an electronic transcription/translation of spoken language to printed text using the Dragon Dictation System.

## 2021-12-22 ENCOUNTER — TREATMENT (OUTPATIENT)
Dept: PHYSICAL THERAPY | Facility: CLINIC | Age: 51
End: 2021-12-22

## 2021-12-22 DIAGNOSIS — M25.522 LEFT ELBOW PAIN: Primary | ICD-10-CM

## 2021-12-22 DIAGNOSIS — M77.12 LATERAL EPICONDYLITIS OF LEFT ELBOW: ICD-10-CM

## 2021-12-22 PROCEDURE — 97110 THERAPEUTIC EXERCISES: CPT | Performed by: PHYSICAL THERAPIST

## 2021-12-22 PROCEDURE — 97035 APP MDLTY 1+ULTRASOUND EA 15: CPT | Performed by: PHYSICAL THERAPIST

## 2021-12-23 ENCOUNTER — TREATMENT (OUTPATIENT)
Dept: PHYSICAL THERAPY | Facility: CLINIC | Age: 51
End: 2021-12-23

## 2021-12-23 DIAGNOSIS — M25.522 LEFT ELBOW PAIN: Primary | ICD-10-CM

## 2021-12-23 DIAGNOSIS — M77.12 LATERAL EPICONDYLITIS OF LEFT ELBOW: ICD-10-CM

## 2021-12-23 PROCEDURE — 97110 THERAPEUTIC EXERCISES: CPT | Performed by: PHYSICAL THERAPIST

## 2021-12-23 PROCEDURE — 97035 APP MDLTY 1+ULTRASOUND EA 15: CPT | Performed by: PHYSICAL THERAPIST

## 2021-12-23 NOTE — PROGRESS NOTES
Physical Therapy Daily Treatment Note      Patient: Jaquan Mckay   : 1970  Referring practitioner: Ronald S Dubin, MD  Date of Initial Visit: Type: THERAPY  Noted: 12/15/2021  Today's Date: 2021  Patient seen for 3 sessions       Visit Diagnoses:    ICD-10-CM ICD-9-CM   1. Left elbow pain  M25.522 719.42   2. Lateral epicondylitis of left elbow  M77.12 726.32       Subjective   Patient reports that he is having 8/10 pain in his left elbow. Patient states that he woke up last night with increased pain on the inside of the left elbow. Patient reports that he done good following therapy session. Patient states that he is continuing to having numbness in his pointer and middle finger. Patient reports that he continues to drop stuff out of his left hand due to the numbness.    Objective   See Exercise, Manual, and Modality Logs for complete treatment.       Assessment/Plan  Patient tolerated treatment session well with rest breaks taken as needed by the patient. No new therex added to treatment session secondary to patient's reports of increased pain. Educated patient to perform therex per his tolerance, patient verbalized understanding. No adverse reactions with modalities or treatment session. Ultrasound performed to the left lateral elbow to improve swelling and inflammation in the area. Ice applied to the left elbow post treatment session. Decrease in pain noted following treatment session. Continue per PT's POC, progress exercises per the patient's tolerance.     Timed:         Manual Therapy:         mins  24299;     Therapeutic Exercise:    30     mins  17676;     Neuromuscular Jazmin:        mins  57429;    Therapeutic Activity:          mins  13804;     Gait Training:           mins  18800;     Ultrasound:     8     mins  88528;    Ionto                                   mins   86233  Self Care                            mins   26421  Canalith Repos         mins 00553      Un-Timed:  Electrical  Stimulation:         mins  18994 ( );  Dry Needling          mins self-pay  Traction          mins 45076      Timed Treatment:   38   mins   Total Treatment:     54   mins (Moist heat 10 minutes and ice 6 minutes)    Radha Martin, [facility]  [place] License: [id number]

## 2021-12-28 ENCOUNTER — TREATMENT (OUTPATIENT)
Dept: PHYSICAL THERAPY | Facility: CLINIC | Age: 51
End: 2021-12-28

## 2021-12-28 DIAGNOSIS — M25.522 LEFT ELBOW PAIN: Primary | ICD-10-CM

## 2021-12-28 DIAGNOSIS — M77.12 LATERAL EPICONDYLITIS OF LEFT ELBOW: ICD-10-CM

## 2021-12-28 PROCEDURE — 97110 THERAPEUTIC EXERCISES: CPT | Performed by: PHYSICAL THERAPIST

## 2021-12-28 PROCEDURE — 97035 APP MDLTY 1+ULTRASOUND EA 15: CPT | Performed by: PHYSICAL THERAPIST

## 2021-12-28 NOTE — PROGRESS NOTES
Physical Therapy Daily Treatment Note      Patient: Jaquan Mckay   : 1970  Referring practitioner: Ronald S Dubin, MD  Date of Initial Visit: Type: THERAPY  Noted: 12/15/2021  Today's Date: 2021  Patient seen for 4 sessions       Visit Diagnoses:    ICD-10-CM ICD-9-CM   1. Left elbow pain  M25.522 719.42   2. Lateral epicondylitis of left elbow  M77.12 726.32       Subjective   Patient states that he is having 9/10 pain in his left elbow. Patient reports that he didn't do anything different and it may be the change in weather. Patient states that he is having numbness in his index and middle finger. Patient reports of tingling in his other fingers.    Objective   See Exercise, Manual, and Modality Logs for complete treatment.       Assessment/Plan  Patient tolerated treatment session well with rest breaks taken as needed by the patient. New therex added per the patient's tolerance, patient demonstrated and understood new therex with no increase in pain noted. Educated patient to perform therex per his tolerance, patient verbalized understanding. No adverse reactions with modalities or treatment session. Verbal cues needed on proper technique of exercises. Treatment session consisted of exercises to improve strength and ROM in the left elbow. Reps increased on elbow ROM with no increase in pain noted. Ultrasound performed to the left lateral elbow to help decrease swelling. Patient requested not to perform ice post treatment session. Continue per PT's POC, progress exercises per the patient's tolerance.    Timed:         Manual Therapy:         mins  32964;     Therapeutic Exercise:    35    mins  33269;     Neuromuscular Jazmin:        mins  78650;    Therapeutic Activity:          mins  91803;     Gait Training:           mins  93555;     Ultrasound:     8     mins  72830;    Ionto                                   mins   03840  Self Care                            mins   22662  [facility]          mins 98532      Un-Timed:  Electrical Stimulation:         mins  43377 ( );  Dry Needling          mins self-pay  Traction          mins 00760      Timed Treatment:   43   mins   Total Treatment:     49   mins    Radha Martin, [facility]  [place] License: [id number]

## 2021-12-30 ENCOUNTER — TREATMENT (OUTPATIENT)
Dept: PHYSICAL THERAPY | Facility: CLINIC | Age: 51
End: 2021-12-30

## 2021-12-30 DIAGNOSIS — M77.12 LATERAL EPICONDYLITIS OF LEFT ELBOW: ICD-10-CM

## 2021-12-30 DIAGNOSIS — M25.522 LEFT ELBOW PAIN: Primary | ICD-10-CM

## 2021-12-30 PROCEDURE — 97035 APP MDLTY 1+ULTRASOUND EA 15: CPT | Performed by: PHYSICAL THERAPIST

## 2021-12-30 PROCEDURE — 97110 THERAPEUTIC EXERCISES: CPT | Performed by: PHYSICAL THERAPIST

## 2021-12-30 NOTE — PROGRESS NOTES
"Physical Therapy Daily Treatment Note      Patient: Jaquan Mckay   : 1970  Referring practitioner: Ronald S Dubin, MD  Date of Initial Visit: Type: THERAPY  Noted: 12/15/2021  Today's Date: 2021  Patient seen for 5 sessions       Visit Diagnoses:    ICD-10-CM ICD-9-CM   1. Left elbow pain  M25.522 719.42   2. Lateral epicondylitis of left elbow  M77.12 726.32       Subjective Evaluation    History of Present Illness    Subjective comment: Patient reports 8/10 pain today.  He notes that his elbow was very stiff this morning and that he had \"pins and needles\" in his left hand; he states that his elbow \"popped\" very loudly when trying to get the \"pins and needles\" to stop inn his hand.  Patient reports trying a heating pad and motrin to help ease his pain this morning.Pain  Current pain ratin (pre-8/10, post-7/10)           Objective   See Exercise, Manual, and Modality Logs for complete treatment.       Assessment & Plan     Assessment    Assessment details: Therapy session initiated with MH to the left elbow to assist with pain control.  There ex minimally progressed due to elevated pain levels.  There ex continued to focus on elbow ROM and strength.  Patient instructed to perform exercises per his tolerance, with patient verbalizing understanding.  Session concluded with pulsed US to the left medial elbow to help decrease swelling.  Patient noted slight decrease in pain at conclusion of session.  He will continue to be progressed per his tolerance and POC.          Timed:         Manual Therapy:         mins  41060;     Therapeutic Exercise:    32     mins  09976;     Neuromuscular Jazmin:        mins  92655;    Therapeutic Activity:          mins  91636;     Gait Training:           mins  53501;     Ultrasound:     8     mins  88233;    Ionto                                   mins   94201  Self Care                            mins   22746  Canalith Repos         mins " 70923      Un-Timed:  Electrical Stimulation:         mins  93146 ( );  Dry Needling          mins self-pay  Traction          mins 05020  Moist heat-5 min    Timed Treatment:   40   mins   Total Treatment:     45   mins    Hanna Burks, PT  [place] License: [id number]

## 2022-01-04 ENCOUNTER — TREATMENT (OUTPATIENT)
Dept: PHYSICAL THERAPY | Facility: CLINIC | Age: 52
End: 2022-01-04

## 2022-01-04 DIAGNOSIS — M77.12 LATERAL EPICONDYLITIS OF LEFT ELBOW: ICD-10-CM

## 2022-01-04 DIAGNOSIS — M25.522 LEFT ELBOW PAIN: Primary | ICD-10-CM

## 2022-01-04 PROCEDURE — 97035 APP MDLTY 1+ULTRASOUND EA 15: CPT | Performed by: PHYSICAL THERAPIST

## 2022-01-04 PROCEDURE — 97110 THERAPEUTIC EXERCISES: CPT | Performed by: PHYSICAL THERAPIST

## 2022-01-04 NOTE — PROGRESS NOTES
Patient: Jaquan Mckay   : 1970  Referring practitioner: Ronald S Dubin, MD  Date of Initial Visit: Type: THERAPY  Noted: 12/15/2021  Today's Date: 2022  Patient seen for 6 sessions           Subjective Evaluation    History of Present Illness    Subjective comment: Pt reports suffering a fall last  due to his left knee giving out.  The patient landed on his left elbow.  Pt reports the pain has been decreasing.  Pt has 8/10 left elbow pain today.       Objective   See Exercise, Manual, and Modality Logs for complete treatment.       Assessment & Plan     Assessment    Assessment details: Tx today consisted of there ex for improved wrist ROM, wrist and elbow stability and functional mobility followed by US to elbow and ice for decreased edema and pain.  Pt responded well to tx today with added 1# weight and springboard and patient reported 6/10 post pain.    Plan  Plan details: Will follow progressing wrist stability and elbow stability with decreased pain.        Visit Diagnoses:    ICD-10-CM ICD-9-CM   1. Left elbow pain  M25.522 719.42   2. Lateral epicondylitis of left elbow  M77.12 726.32       Progress strengthening /stabilization /functional activity           Timed:  Manual Therapy:         mins  34840;  Therapeutic Exercise:    34     mins  97020;     Neuromuscular Jazmin:        mins  92606;    Therapeutic Activity:          mins  85405;     Gait Training:           mins  94095;     Ultrasound:      8    mins  15502;    Electrical Stimulation:         mins  64888 ( );    Untimed:  Electrical Stimulation:         mins  49043 ( );  Mechanical Traction:         mins  36146;     Timed Treatment:   42   mins   Total Treatment:     48   Mins(6min ice)  Win Rodriguez, PT  Physical Therapist

## 2022-01-06 ENCOUNTER — TREATMENT (OUTPATIENT)
Dept: PHYSICAL THERAPY | Facility: CLINIC | Age: 52
End: 2022-01-06

## 2022-01-06 DIAGNOSIS — M77.12 LATERAL EPICONDYLITIS OF LEFT ELBOW: ICD-10-CM

## 2022-01-06 DIAGNOSIS — M25.522 LEFT ELBOW PAIN: Primary | ICD-10-CM

## 2022-01-06 PROCEDURE — 97035 APP MDLTY 1+ULTRASOUND EA 15: CPT | Performed by: PHYSICAL THERAPIST

## 2022-01-06 PROCEDURE — 97110 THERAPEUTIC EXERCISES: CPT | Performed by: PHYSICAL THERAPIST

## 2022-01-06 NOTE — PROGRESS NOTES
Patient: Jaquan Mckay   : 1970  Referring practitioner: Ronald S Dubin, MD  Date of Initial Visit: Type: THERAPY  Noted: 12/15/2021  Today's Date: 2022  Patient seen for 7 sessions           Subjective Evaluation    History of Present Illness    Subjective comment: Ptreports having 8/10 elbow pain today.       Objective   See Exercise, Manual, and Modality Logs for complete treatment.       Assessment & Plan     Assessment    Assessment details: Tx today consisted of there ex for improved wrist ROM and elbow stability followed by US to lat epicondyle. Pt demonstrated good effort today and responded well to added weight to 2# today.  Pt also reported increased pain with supination and pronation activities today.  Pt reported decreased pain ot 10 post tx.    Plan  Plan details: Will follow progressing wrist and elbow stability and decreased pain.        Visit Diagnoses:    ICD-10-CM ICD-9-CM   1. Left elbow pain  M25.522 719.42   2. Lateral epicondylitis of left elbow  M77.12 726.32       Progress strengthening /stabilization /functional activity           Timed:  Manual Therapy:         mins  12797;  Therapeutic Exercise:    31     mins  18818;     Neuromuscular Jazmin:        mins  46942;    Therapeutic Activity:          mins  29625;     Gait Training:           mins  69536;     Ultrasound:     8     mins  51464;    Electrical Stimulation:         mins  41780 ( );    Untimed:  Electrical Stimulation:         mins  58721 ( );  Mechanical Traction:         mins  50231;     Timed Treatment:   39   mins   Total Treatment:     44   mins  Win Rodriguez, PT  Physical Therapist

## 2022-01-11 ENCOUNTER — TREATMENT (OUTPATIENT)
Dept: PHYSICAL THERAPY | Facility: CLINIC | Age: 52
End: 2022-01-11

## 2022-01-11 DIAGNOSIS — M25.522 LEFT ELBOW PAIN: Primary | ICD-10-CM

## 2022-01-11 DIAGNOSIS — M77.12 LATERAL EPICONDYLITIS OF LEFT ELBOW: ICD-10-CM

## 2022-01-11 PROCEDURE — 97035 APP MDLTY 1+ULTRASOUND EA 15: CPT | Performed by: PHYSICAL THERAPIST

## 2022-01-11 PROCEDURE — 97110 THERAPEUTIC EXERCISES: CPT | Performed by: PHYSICAL THERAPIST

## 2022-01-11 NOTE — PROGRESS NOTES
Physical Therapy Daily Treatment Note      Patient: Jaquan Mckay   : 1970  Referring practitioner: Ronald S Dubin, MD  Date of Initial Visit: Type: THERAPY  Noted: 12/15/2021  Today's Date: 2022  Patient seen for 8 sessions       Visit Diagnoses:    ICD-10-CM ICD-9-CM   1. Left elbow pain  M25.522 719.42   2. Lateral epicondylitis of left elbow  M77.12 726.32       Subjective Evaluation    History of Present Illness    Subjective comment: Patient reports that he has 7/10 pain today.  He notes that he slipped on the snow/ice a few days ago, but did not injure his left elbow; he states that paramedics were called and that he was assessed by the paramedics after his fall.  Pain  Current pain ratin           Objective   See Exercise, Manual, and Modality Logs for complete treatment.       Assessment & Plan     Assessment    Assessment details: Therapy session consisted of moist heat, there ex, and pulsed US.  No adverse reactions were noted with modalities.  There ex progressed to include increased repetitions.  Patient instructed to perform exercises per his tolerance, with patient verbalizing understanding.  Rest breaks were provided as necessary throughout session.  Patient will continue to be progressed per his tolerance and POC.          Timed:         Manual Therapy:         mins  74883;     Therapeutic Exercise:    34     mins  19176;     Neuromuscular Jazmin:        mins  37228;    Therapeutic Activity:          mins  57266;     Gait Training:           mins  46313;     Ultrasound:     8     mins  33423;    Ionto                                   mins   43521  Self Care                            mins   76399  Canalith Repos         mins 22207      Un-Timed:  Electrical Stimulation:         mins  24027 ( );  Dry Needling          mins self-pay  Traction          mins 63530  Moist heat-5 min    Timed Treatment:   42   mins   Total Treatment:     47   mins    Hanna Burks, PT  [place]  License: [id number]

## 2022-01-13 ENCOUNTER — TREATMENT (OUTPATIENT)
Dept: PHYSICAL THERAPY | Facility: CLINIC | Age: 52
End: 2022-01-13

## 2022-01-13 DIAGNOSIS — M25.522 LEFT ELBOW PAIN: Primary | ICD-10-CM

## 2022-01-13 DIAGNOSIS — M77.12 LATERAL EPICONDYLITIS OF LEFT ELBOW: ICD-10-CM

## 2022-01-13 PROCEDURE — 97035 APP MDLTY 1+ULTRASOUND EA 15: CPT | Performed by: PHYSICAL THERAPIST

## 2022-01-13 PROCEDURE — 97110 THERAPEUTIC EXERCISES: CPT | Performed by: PHYSICAL THERAPIST

## 2022-01-13 NOTE — PROGRESS NOTES
Physical Therapy Re Certification Of Plan of Care  Patient: Jaquan Mckay   : 1970  Diagnosis/ICD-10 Code:  Decreased ROM of right elbow [M25.621]  Referring practitioner: Ronald S Dubin, MD  Date of Initial Visit: Type: THERAPY  Noted: 12/15/2021  Today's Date: 2022  Patient seen for 9 sessions         Visit Diagnoses:    ICD-10-CM ICD-9-CM   1. Decreased ROM of right elbow  M25.621 719.52         Jaquan Mckay reports:   Subjective Questionnaire: QuickDASH: 68%  Clinical Progress: improved  Home Program Compliance: Yes  Treatment has included: therapeutic exercise, neuromuscular re-education, manual therapy, therapeutic activity, electrical stimulation, ultrasound, moist heat and cryotherapy      Subjective Evaluation    History of Present Illness    Subjective comment: Pt reports therapy has been helping with his pain.Pain  Current pain ratin  At best pain ratin  At worst pain ratin  Location: left elbow             Objective          Active Range of Motion     Left Elbow   Flexion: 132 degrees   Forearm supination: 55 degrees   Forearm pronation: WFL    Additional Active Range of Motion Details  Left elbow lacks 4 degrees from full ext    Strength/Myotome Testing     Left Elbow   Flexion: 4  Extension: 4-  Forearm supination: 3+  Forearm pronation: 3+    Left Wrist/Hand      (2nd hand position)     Trial 1: 64 lbs    Trial 2: 60 lbs    Trial 3: 62 lbs    Average: 62 lbs          Assessment & Plan     Assessment  Impairments: abnormal or restricted ROM, activity intolerance, impaired physical strength, lacks appropriate home exercise program and pain with function  Functional Limitations: carrying objects, sleeping, pulling, pushing, uncomfortable because of pain, reaching behind back, reaching overhead and unable to perform repetitive tasks  Assessment details: Patient is a 51 year old male who comes to physical therapy for left elbow pain.  Pt has attended 9 sessions with noted  improved elbow ROM, improved strength and mild decreased pain.   Patient reports a 68.18%% functional mobility impairment, based on the patient's response to the QuickDash.  Therapy will cont to follow patient for improved mobility and decreased pain.  Prognosis: good    Goals  Plan Goals: STGs: 4 weeks  1. Patient will be independent/compliant with HEP.met  2. Patient will report pain no greater than 6/10 when performing self-care activities.not met  3. Left elbow AROM will improve to at least 10-90 to allow for greater ease with daily tasks.met    LTGs: 8 weeks  1. Patient will improve left  strength to at least 80# to allow for greater ease with opening items. progressing  2. Left elbow AROM will improve to WFL to allow for greater ease with daily tasks.not met  3. Patient will report left elbow pain no greater than 4/10 with moderate lifting.not met    Plan  Therapy options: will be seen for skilled therapy services  Planned modality interventions: cryotherapy, iontophoresis, electrical stimulation/Russian stimulation, TENS, thermotherapy (hydrocollator packs) and ultrasound  Planned therapy interventions: manual therapy, ADL retraining, neuromuscular re-education, body mechanics training, postural training, soft tissue mobilization, flexibility, functional ROM exercises, strengthening, stretching, home exercise program, therapeutic activities, IADL retraining, transfer training and joint mobilization  Frequency: 2x week  Duration in visits: 8  Duration in weeks: 4  Treatment plan discussed with: patient  Plan details: Moderate Evaluation  20348  Re-evaluation   27208    Therapeutic exercise  40992  Therapeutic activity    80437  Neuromuscular re-education   62810  Manual therapy   70948    Unattended e-stim (Medicaid/Medicare)     Moist heat/cryotherapy 04635   Ultrasound   51220  Iontophoresis   18769               Recommendations: Continue as planned  Timeframe: 1 month  Prognosis to achieve goals:  good      Timed:         Manual Therapy:         mins  86532;     Therapeutic Exercise:    35     mins  03011;     Neuromuscular Jazmin:        mins  44097;    Therapeutic Activity:          mins  40456;     Gait Training:           mins  24821;     Ultrasound:     8     mins  99613;    Ionto                                   mins   66381  Self Care                            mins   23548  Canalith Repos         mins 80697      Un-Timed:  Electrical Stimulation:         mins  53019 ( );  Dry Needling          mins self-pay  Traction          mins 90496  Re-Eval                               mins  24934      Timed Treatment:   43   mins   Total Treatment:     48   Mins(5min mh)          PT: Win Rodriguez PT     [place] License:  [id number]    Electronically signed by Win Rodriguez PT, 01/13/22, 1:38 PM EST    Certification Period: 1/13/2022 thru 4/12/2022  I certify that the therapy services are furnished while this patient is under my care.  The services outlined above are required by this patient, and will be reviewed every 90 days.         Physician Signature:__________________________________________________    PHYSICIAN: Dubin, Ronald S, MD      DATE:     Please sign and return via fax to [email or web address] . Thank you, [facility]

## 2022-01-18 ENCOUNTER — TELEPHONE (OUTPATIENT)
Dept: ORTHOPEDICS | Facility: OTHER | Age: 52
End: 2022-01-18

## 2022-01-19 ENCOUNTER — OFFICE VISIT (OUTPATIENT)
Dept: FAMILY MEDICINE CLINIC | Facility: CLINIC | Age: 52
End: 2022-01-19

## 2022-01-19 VITALS
TEMPERATURE: 98.4 F | HEART RATE: 80 BPM | DIASTOLIC BLOOD PRESSURE: 72 MMHG | SYSTOLIC BLOOD PRESSURE: 120 MMHG | HEIGHT: 67 IN | WEIGHT: 235 LBS | OXYGEN SATURATION: 98 % | BODY MASS INDEX: 36.88 KG/M2

## 2022-01-19 DIAGNOSIS — J34.89 NASAL SORE: ICD-10-CM

## 2022-01-19 DIAGNOSIS — I10 ESSENTIAL HYPERTENSION: Primary | ICD-10-CM

## 2022-01-19 DIAGNOSIS — F41.1 GENERALIZED ANXIETY DISORDER: ICD-10-CM

## 2022-01-19 DIAGNOSIS — M25.50 MULTIPLE JOINT PAIN: ICD-10-CM

## 2022-01-19 DIAGNOSIS — R30.0 DYSURIA: ICD-10-CM

## 2022-01-19 DIAGNOSIS — Z23 ENCOUNTER FOR IMMUNIZATION: ICD-10-CM

## 2022-01-19 LAB
BILIRUB BLD-MCNC: NEGATIVE MG/DL
CLARITY, POC: CLEAR
COLOR UR: YELLOW
EXPIRATION DATE: NORMAL
GLUCOSE UR STRIP-MCNC: NEGATIVE MG/DL
KETONES UR QL: NEGATIVE
LEUKOCYTE EST, POC: NEGATIVE
Lab: NORMAL
NITRITE UR-MCNC: NEGATIVE MG/ML
PH UR: 6 [PH] (ref 5–8)
PROT UR STRIP-MCNC: NEGATIVE MG/DL
RBC # UR STRIP: NEGATIVE /UL
SP GR UR: 1.02 (ref 1–1.03)
UROBILINOGEN UR QL: NORMAL

## 2022-01-19 PROCEDURE — 90471 IMMUNIZATION ADMIN: CPT | Performed by: NURSE PRACTITIONER

## 2022-01-19 PROCEDURE — 90686 IIV4 VACC NO PRSV 0.5 ML IM: CPT | Performed by: NURSE PRACTITIONER

## 2022-01-19 PROCEDURE — 99214 OFFICE O/P EST MOD 30 MIN: CPT | Performed by: NURSE PRACTITIONER

## 2022-01-19 NOTE — PROGRESS NOTES
Injection  Injection performed in right deltoid by Radha Leija MA. Patient tolerated the procedure well without complications.  01/19/22   Radha Leija MA

## 2022-01-19 NOTE — PROGRESS NOTES
"Subjective   Jaquan Mckay is a 51 y.o. male.     Chief Complaint   Patient presents with   • Hypertension   • Joint Pain       History of Present Illness     HTN-chronic and ongoing. Taking Lisinopril 30 mg.  No negative side effects.  No associated symptoms such as CP, SOA, HA, or dizziness.  He has not had a recent cardiology follow-up.  He has mostly tried to avoid going to any specialty appointments due to Covid.  Low Back pain with radiation to pelvis-reports for several days he has had the discomfort.  He is concerned he has a UTI.  Some burning with urination.   He reports some pelvis pressure.  He denies any constipation.  He reports that it is not similar to the last time he had an appendicolith and constipation.   He reports urine is not dark but he has noted \"stuff floating\".  No obvious blood.    Elbow follow up-was advised to stay in PT for his therapy.  Is having slow progress he reports.  Needs improved .  No new concerns.   Left Knee follow up-under care of Dr Eagle in [place].  He reports he will be possibly having an MRI for comparison to his old film.    Memory concern-reports he feels since covid he has noted some memory decrease.  Reports he forgets if he took his meds.   He reports that he forgets why he is out to do things.    Comp Care-reports that he has a new worker at [facility].  He reports it is Yohana.  His previous person has taken a new position.  He reports they will be planning to work on diet, stress reduction, and anger management.   Recurrent nasal sore-request a refill on mupirocin ointment.  He reports that his nose is gotten very tender since the weather has been drier.  He reports that occasionally he has a sore that bleeds in his nostril.    The following portions of the patient's history were reviewed and updated as appropriate: CC, ROS, allergies, current medications, past family history, past medical history, past social history, past surgical history and problem " "list.      Review of Systems   Constitutional: Positive for fatigue. Negative for appetite change, unexpected weight gain and unexpected weight loss.   HENT: Negative for congestion, ear pain, postnasal drip, rhinorrhea, sore throat, swollen glands, trouble swallowing and voice change.    Eyes: Negative for pain and visual disturbance.   Respiratory: Negative for cough, chest tightness, shortness of breath and wheezing.    Cardiovascular: Negative for chest pain, palpitations and leg swelling.   Gastrointestinal: Negative for abdominal pain, blood in stool, constipation, diarrhea, nausea and indigestion.   Genitourinary: Positive for decreased urine volume and dysuria. Negative for hematuria and urgency.   Musculoskeletal: Positive for arthralgias, gait problem and myalgias. Negative for back pain and joint swelling.   Skin: Negative for color change and skin lesions.   Allergic/Immunologic: Negative.    Neurological: Negative for numbness, headache and confusion.   Hematological: Negative.    Psychiatric/Behavioral: Positive for dysphoric mood and stress. Negative for sleep disturbance and suicidal ideas. The patient is nervous/anxious.    All other systems reviewed and are negative.      Objective     /72 (BP Location: Left arm, Patient Position: Sitting, Cuff Size: Adult)   Pulse 80   Temp 98.4 °F (36.9 °C) (Temporal)   Ht 170.2 cm (67.01\")   Wt 107 kg (235 lb)   SpO2 98%   BMI 36.80 kg/m²     Physical Exam  Vitals reviewed.   Constitutional:       General: He is not in acute distress.     Appearance: He is well-developed. He is not diaphoretic.   HENT:      Head: Normocephalic and atraumatic.      Jaw: No tenderness.      Comments: Oropharynx not examined.  Patient is presently wearing a face covering/mask due to COVID-19 pandemic.     Right Ear: Hearing, tympanic membrane, ear canal and external ear normal.      Left Ear: Hearing, tympanic membrane, ear canal and external ear normal.   Eyes:      " General: Lids are normal. No scleral icterus.     Extraocular Movements:      Right eye: Normal extraocular motion and no nystagmus.      Left eye: Normal extraocular motion and no nystagmus.      Conjunctiva/sclera: Conjunctivae normal.      Pupils: Pupils are equal, round, and reactive to light.   Neck:      Thyroid: No thyromegaly or thyroid tenderness.      Vascular: No carotid bruit or JVD.      Trachea: No tracheal tenderness.   Cardiovascular:      Rate and Rhythm: Normal rate and regular rhythm.      Pulses:           Dorsalis pedis pulses are 2+ on the right side and 2+ on the left side.        Posterior tibial pulses are 2+ on the right side and 2+ on the left side.      Heart sounds: Normal heart sounds, S1 normal and S2 normal. No murmur heard.      Pulmonary:      Effort: Pulmonary effort is normal. No accessory muscle usage, prolonged expiration or respiratory distress.      Breath sounds: Normal breath sounds.   Chest:      Chest wall: No tenderness.   Abdominal:      General: Bowel sounds are normal. There is no distension.      Palpations: Abdomen is soft. There is no mass.      Tenderness: There is no abdominal tenderness.   Musculoskeletal:      Right elbow: Tenderness present.      Left elbow: Tenderness present.      Cervical back: Normal range of motion and neck supple.      Thoracic back: Tenderness present.      Lumbar back: Spasms and tenderness present.      Right knee: Decreased range of motion. Tenderness present.      Left knee: Decreased range of motion. Tenderness present.      Right lower leg: No edema.      Left lower leg: No edema.      Comments: No muscular atrophy or flaccidity.   Lymphadenopathy:      Head:      Right side of head: No submental or submandibular adenopathy.      Left side of head: No submental or submandibular adenopathy.      Cervical: No cervical adenopathy.      Right cervical: No superficial cervical adenopathy.     Left cervical: No superficial cervical  adenopathy.   Skin:     General: Skin is warm and dry.      Capillary Refill: Capillary refill takes less than 2 seconds.      Coloration: Skin is not jaundiced or pale.      Findings: No erythema.      Nails: There is no clubbing.   Neurological:      Mental Status: He is alert and oriented to person, place, and time.      Cranial Nerves: No cranial nerve deficit or facial asymmetry.      Sensory: Sensory deficit (  Decreased sensation in BLE-scattered pattern) present.      Motor: No weakness, tremor, atrophy or abnormal muscle tone.      Coordination: Coordination normal.      Gait: Gait abnormal (  Moderately antalgic).      Deep Tendon Reflexes: Reflexes are normal and symmetric.   Psychiatric:         Attention and Perception: He is attentive.         Mood and Affect: Mood normal.         Speech: Speech normal.         Behavior: Behavior normal. Behavior is cooperative.         Thought Content: Thought content normal.         Judgment: Judgment normal.       Assessment/Plan     Diagnoses and all orders for this visit:    1. Essential hypertension (Primary)  -     Ambulatory Referral to Cardiology    2. Dysuria  Assessment & Plan:  Push fluids, decrease soda and avoid energy drinks.       Orders:  -     POC Urinalysis Dipstick, Automated    3. Generalized anxiety disorder  Overview:  With insomnia    Assessment & Plan:  Continue under the care of [facility].  Continue to be active   Stress reduction advised (examples:  make time for self, Reading, Listening to music, Exercise, keeping a journal, venting to a confidant, etc.)        4. Encounter for immunization  -     FluLaval/Fluarix/Fluzone >6 Months (0923-5994)    5. Nasal sore  Comments:  Bactroban ointment to area.  Report any nonhealing area or signs or symptoms of infection  Orders:  -     mupirocin (BACTROBAN) 2 % ointment; Apply  topically to the appropriate area as directed 3 (Three) Times a Day.  Dispense: 30 g; Refill: 2    6. Multiple joint  pain  Comments:  continue to be active.  continue under care of orthopedic for elbow and knee complaints       Patient's Body mass index is 36.8 kg/m². indicating that he is morbidly obese (BMI > 40 or > 35 with obesity - related health condition). Obesity-related health conditions include the following: hypertension, dyslipidemias and GERD. Obesity is unchanged. BMI is is above average. We discussed portion control and increasing exercise..     Flu vaccine administered today.  Understands risk and benefits.  Understands may be 2-3 week before immunity is obtained.  Information sheet provided today on the [id number] vax.    Understands disease processes and need for medications.  Understands reasons for urgent and emergent care.  Patient (& family) verbalized agreement for treatment plan.   Emotional support and active listening provided.  Patient provided time to verbalize feelings.    Reminded patient to obtain his fasting labs.     RTC 1 month, sooner if needed.             This document has been electronically signed by:  BALDOMERO Thao FNP-C Dragon disclaimer:  Part of this note may be an electronic transcription/translation of spoken language to printed text using the Dragon Dictation System.        New referral back to allergy specialist  UPdated referral to cardiologyarley

## 2022-01-20 ENCOUNTER — TREATMENT (OUTPATIENT)
Dept: PHYSICAL THERAPY | Facility: CLINIC | Age: 52
End: 2022-01-20

## 2022-01-20 DIAGNOSIS — M25.522 LEFT ELBOW PAIN: Primary | ICD-10-CM

## 2022-01-20 DIAGNOSIS — M77.12 LATERAL EPICONDYLITIS OF LEFT ELBOW: ICD-10-CM

## 2022-01-20 PROCEDURE — 97110 THERAPEUTIC EXERCISES: CPT | Performed by: PHYSICAL THERAPIST

## 2022-01-20 PROCEDURE — 97035 APP MDLTY 1+ULTRASOUND EA 15: CPT | Performed by: PHYSICAL THERAPIST

## 2022-01-20 NOTE — PROGRESS NOTES
Patient: Jaquan Mckay   : 1970  Referring practitioner: Ronald S Dubin, MD  Date of Initial Visit: Type: THERAPY  Noted: 12/15/2021  Today's Date: 2022  Patient seen for 10 sessions           Subjective Evaluation    History of Present Illness    Subjective comment: Pt reports having 8/10 pain today.  Pt reports his MD wants him to continue therapy for max gains.  Pt also reports having increased numbness in the left and second and third finger.       Objective   See Exercise, Manual, and Modality Logs for complete treatment.       Assessment & Plan     Assessment    Assessment details: Tx today consisted of mh to elbow for improved mobility followed by stretching to improve mobility with ADLs; there ex progressed with increased resistance to enhance lifting and sports followed by US to left forearm for decreased inflammation and ice.  Pt demonstrated good effort with mild pain with wrist flexion with 3# weight.  Pt tolerated increased weight and reps with bicep curls.  Pt reported 3/10 post pain.    Plan  Plan details: Will follow progressing elbow stability and decreased pain.  Will continue to progress elbow and wrist stability.          Visit Diagnoses:    ICD-10-CM ICD-9-CM   1. Left elbow pain  M25.522 719.42   2. Lateral epicondylitis of left elbow  M77.12 726.32       Progress strengthening /stabilization /functional activity           Timed:  Manual Therapy:         mins  47236;  Therapeutic Exercise:    34     mins  29289;     Neuromuscular Jazmin:        mins  92516;    Therapeutic Activity:          mins  17812;     Gait Training:           mins  47233;     Ultrasound:     8     mins  46722;    Electrical Stimulation:         mins  61162 ( );    Untimed:  Electrical Stimulation:         mins  52351 ( );  Mechanical Traction:         mins  96438;     Timed Treatment:   42   mins   Total Treatment:     52   Mins(5min mh and 5 min ice)  Win Rodriguez, PT  Physical  Therapist

## 2022-01-25 NOTE — ASSESSMENT & PLAN NOTE
Continue under the care of [facility].  Continue to be active   Stress reduction advised (examples:  make time for self, Reading, Listening to music, Exercise, keeping a journal, venting to a confidant, etc.)

## 2022-01-26 ENCOUNTER — TELEPHONE (OUTPATIENT)
Dept: CARDIOLOGY | Facility: CLINIC | Age: 52
End: 2022-01-26

## 2022-01-26 DIAGNOSIS — F51.01 PRIMARY INSOMNIA: ICD-10-CM

## 2022-01-26 DIAGNOSIS — G89.29 CHRONIC BILATERAL LOW BACK PAIN WITH LEFT-SIDED SCIATICA: ICD-10-CM

## 2022-01-26 DIAGNOSIS — R56.9 SEIZURES: ICD-10-CM

## 2022-01-26 DIAGNOSIS — M54.42 CHRONIC BILATERAL LOW BACK PAIN WITH LEFT-SIDED SCIATICA: ICD-10-CM

## 2022-01-26 DIAGNOSIS — I10 ESSENTIAL HYPERTENSION: ICD-10-CM

## 2022-01-26 NOTE — TELEPHONE ENCOUNTER
Left message on answering machine requesting patient return my call.  Patient needs appointment scheduled with cardiology.

## 2022-01-27 ENCOUNTER — TREATMENT (OUTPATIENT)
Dept: PHYSICAL THERAPY | Facility: CLINIC | Age: 52
End: 2022-01-27

## 2022-01-27 DIAGNOSIS — M77.12 LATERAL EPICONDYLITIS OF LEFT ELBOW: ICD-10-CM

## 2022-01-27 DIAGNOSIS — M25.522 LEFT ELBOW PAIN: Primary | ICD-10-CM

## 2022-01-27 PROCEDURE — 97110 THERAPEUTIC EXERCISES: CPT | Performed by: PHYSICAL THERAPIST

## 2022-01-27 PROCEDURE — 97530 THERAPEUTIC ACTIVITIES: CPT | Performed by: PHYSICAL THERAPIST

## 2022-01-27 PROCEDURE — 97035 APP MDLTY 1+ULTRASOUND EA 15: CPT | Performed by: PHYSICAL THERAPIST

## 2022-01-27 RX ORDER — LISINOPRIL 30 MG/1
30 TABLET ORAL DAILY
Qty: 30 TABLET | Refills: 5 | Status: SHIPPED | OUTPATIENT
Start: 2022-01-27 | End: 2022-04-20 | Stop reason: SDUPTHER

## 2022-01-27 RX ORDER — MIRTAZAPINE 30 MG/1
TABLET, FILM COATED ORAL
Qty: 45 TABLET | Refills: 5 | Status: SHIPPED | OUTPATIENT
Start: 2022-01-27 | End: 2022-04-20 | Stop reason: SDUPTHER

## 2022-01-27 RX ORDER — PHENYTOIN SODIUM 100 MG/1
200 CAPSULE, EXTENDED RELEASE ORAL 2 TIMES DAILY
Qty: 120 CAPSULE | Refills: 5 | Status: SHIPPED | OUTPATIENT
Start: 2022-01-27 | End: 2022-04-20 | Stop reason: SDUPTHER

## 2022-01-27 RX ORDER — METHOCARBAMOL 750 MG/1
750 TABLET, FILM COATED ORAL 2 TIMES DAILY PRN
Qty: 60 TABLET | Refills: 5 | Status: SHIPPED | OUTPATIENT
Start: 2022-01-27 | End: 2022-08-11

## 2022-01-27 NOTE — PROGRESS NOTES
Physical Therapy Daily Treatment Note      Patient: Jaquan Mckay   : 1970  Referring practitioner: Ronald S Dubin, MD  Date of Initial Visit: Type: THERAPY  Noted: 12/15/2021  Today's Date: 2022  Patient seen for 11 sessions       Visit Diagnoses:    ICD-10-CM ICD-9-CM   1. Left elbow pain  M25.522 719.42   2. Lateral epicondylitis of left elbow  M77.12 726.32       Subjective Evaluation    History of Present Illness    Subjective comment: Patient reports increased left elbow pain, stating that he has been moving bedroom furniture.Pain  Current pain ratin           Objective   See Exercise, Manual, and Modality Logs for complete treatment.       Assessment & Plan     Assessment    Assessment details: Patient tolerated today's session well, noting decreased pain at conclusion of session.  Patient reported 9/10 pain pre-tx and 7/10 pain post-tx.  Therapy session was initiated with moist heat, followed by there ex and therapeutic activities.  There ex focused on strengthening and ROM.  Patient also performed therapeutic activities that replicated functional daily tasks, such as twisting objects or flipping over cards.  Session was concluded with pulsed US, with no adverse reactions following.  He declined ice at conclusion of session.  Patient will continue to be progressed per his tolerance and POC.          Timed:         Manual Therapy:         mins  99400;     Therapeutic Exercise:    34     mins  21769;     Neuromuscular Jazmin:        mins  39787;    Therapeutic Activity:     11     mins  41872;     Gait Training:           mins  79912;     Ultrasound:     8     mins  23624;    Ionto                                   mins   89001  Self Care                            mins   97743  Canalith Repos         mins 69671      Un-Timed:  Electrical Stimulation:         mins  34909 ( );  Dry Needling          mins self-pay  Traction          mins 67125  Moist heat-6 min    Timed Treatment:   53    mins   Total Treatment:     59   mins      Hanna Burks, PT  [place] License: [id number]      Electronically signed by Hanna Burks PT, 01/27/22, 2:33 PM EST.

## 2022-01-28 ENCOUNTER — OFFICE VISIT (OUTPATIENT)
Dept: UROLOGY | Facility: CLINIC | Age: 52
End: 2022-01-28

## 2022-01-28 ENCOUNTER — HOSPITAL ENCOUNTER (OUTPATIENT)
Dept: GENERAL RADIOLOGY | Facility: HOSPITAL | Age: 52
Discharge: HOME | End: 2022-01-28
Admitting: NURSE PRACTITIONER

## 2022-01-28 VITALS — HEIGHT: 67 IN | BODY MASS INDEX: 36.88 KG/M2 | WEIGHT: 235 LBS

## 2022-01-28 DIAGNOSIS — N20.0 RENAL CALCULUS: ICD-10-CM

## 2022-01-28 DIAGNOSIS — R33.9 INCOMPLETE BLADDER EMPTYING: ICD-10-CM

## 2022-01-28 DIAGNOSIS — N40.0 BPH WITHOUT OBSTRUCTION/LOWER URINARY TRACT SYMPTOMS: Primary | ICD-10-CM

## 2022-01-28 DIAGNOSIS — K59.04 CHRONIC IDIOPATHIC CONSTIPATION: ICD-10-CM

## 2022-01-28 DIAGNOSIS — R19.7 DIARRHEA, UNSPECIFIED TYPE: ICD-10-CM

## 2022-01-28 DIAGNOSIS — R10.84 GENERALIZED ABDOMINAL PAIN: ICD-10-CM

## 2022-01-28 DIAGNOSIS — R10.9 BILATERAL FLANK PAIN: ICD-10-CM

## 2022-01-28 DIAGNOSIS — R35.0 FREQUENCY OF URINATION: ICD-10-CM

## 2022-01-28 DIAGNOSIS — Z86.010 HISTORY OF COLON POLYPS: ICD-10-CM

## 2022-01-28 LAB
BILIRUB BLD-MCNC: NEGATIVE MG/DL
CLARITY, POC: ABNORMAL
COLOR UR: YELLOW
EXPIRATION DATE: ABNORMAL
GLUCOSE UR STRIP-MCNC: NEGATIVE MG/DL
KETONES UR QL: NEGATIVE
LEUKOCYTE EST, POC: NEGATIVE
Lab: ABNORMAL
NITRITE UR-MCNC: NEGATIVE MG/ML
PH UR: 6.5 [PH] (ref 5–8)
PROT UR STRIP-MCNC: NEGATIVE MG/DL
RBC # UR STRIP: NEGATIVE /UL
SP GR UR: 1.01 (ref 1–1.03)
UROBILINOGEN UR QL: NORMAL

## 2022-01-28 PROCEDURE — 74018 RADEX ABDOMEN 1 VIEW: CPT | Performed by: RADIOLOGY

## 2022-01-28 PROCEDURE — 74018 RADEX ABDOMEN 1 VIEW: CPT

## 2022-01-28 PROCEDURE — 96372 THER/PROPH/DIAG INJ SC/IM: CPT | Performed by: NURSE PRACTITIONER

## 2022-01-28 PROCEDURE — 87086 URINE CULTURE/COLONY COUNT: CPT | Performed by: NURSE PRACTITIONER

## 2022-01-28 PROCEDURE — 99214 OFFICE O/P EST MOD 30 MIN: CPT | Performed by: NURSE PRACTITIONER

## 2022-01-28 RX ORDER — MAGNESIUM CARB/ALUMINUM HYDROX 105-160MG
296 TABLET,CHEWABLE ORAL TAKE AS DIRECTED
Qty: 592 ML | Refills: 0 | Status: SHIPPED | OUTPATIENT
Start: 2022-01-28 | End: 2022-07-21

## 2022-01-28 RX ORDER — KETOROLAC TROMETHAMINE 30 MG/ML
30 INJECTION, SOLUTION INTRAMUSCULAR; INTRAVENOUS EVERY 6 HOURS PRN
Status: SHIPPED | OUTPATIENT
Start: 2022-01-28 | End: 2022-02-02

## 2022-01-28 RX ORDER — KETOROLAC TROMETHAMINE 10 MG/1
10 TABLET, FILM COATED ORAL EVERY 6 HOURS PRN
Qty: 16 TABLET | Refills: 0 | Status: SHIPPED | OUTPATIENT
Start: 2022-01-28 | End: 2022-10-10

## 2022-01-28 RX ADMIN — KETOROLAC TROMETHAMINE 30 MG: 30 INJECTION, SOLUTION INTRAMUSCULAR; INTRAVENOUS at 15:17

## 2022-01-28 NOTE — PATIENT INSTRUCTIONS
"Textbook [employer] Medicine (5th ed., pp. 0745-7632.e3). [place], [place]: [employer].\">   Dietary Guidelines to Help Prevent Kidney Stones  Kidney stones are deposits of minerals and salts that form inside your kidneys. Your risk of developing kidney stones may be greater depending on your diet, your lifestyle, the medicines you take, and whether you have certain medical conditions. Most people can lower their chances of developing kidney stones by following the instructions below. Your dietitian may give you more specific instructions depending on your overall health and the type of kidney stones you tend to develop.  What are tips for following this plan?  Reading food labels    · Choose foods with \"no salt added\" or \"low-salt\" labels. Limit your salt (sodium) intake to less than 1,500 mg a day.  · Choose foods with calcium for each meal and snack. Try to eat about 300 mg of calcium at each meal. Foods that contain 200-500 mg of calcium a serving include:  ? 8 oz (237 mL) of milk, calcium-fortifiednon-dairy milk, and calcium-fortifiedfruit juice. Calcium-fortified means that calcium has been added to these drinks.  ? 8 oz (237 mL) of kefir, yogurt, and soy yogurt.  ? 4 oz (114 g) of tofu.  ? 1 oz (28 g) of cheese.  ? 1 cup (150 g) of dried figs.  ? 1 cup (91 g) of cooked broccoli.  ? One 3 oz (85 g) can of sardines or mackerel.  Most people need 1,000-1,500 mg of calcium a day. Talk to your dietitian about how much calcium is recommended for you.  Shopping  · Buy plenty of fresh fruits and vegetables. Most people do not need to avoid fruits and vegetables, even if these foods contain nutrients that may contribute to kidney stones.  · When shopping for convenience foods, choose:  ? Whole pieces of fruit.  ? Pre-made salads with dressing on the side.  ? Low-fat fruit and yogurt smoothies.  · Avoid buying frozen meals or prepared deli foods. These can be high in sodium.  · Look for foods with live cultures, such as " yogurt and kefir.  · Choose high-fiber grains, such as whole-wheat breads, oat bran, and wheat cereals.  Cooking  · Do not add salt to food when cooking. Place a salt shaker on the table and allow each person to add his or her own salt to taste.  · Use vegetable protein, such as beans, textured vegetable protein (TVP), or tofu, instead of meat in pasta, casseroles, and soups.  Meal planning  · Eat less salt, if told by your dietitian. To do this:  ? Avoid eating processed or pre-made food.  ? Avoid eating fast food.  · Eat less animal protein, including cheese, meat, poultry, or fish, if told by your dietitian. To do this:  ? Limit the number of times you have meat, poultry, fish, or cheese each week. Eat a diet free of meat at least 2 days a week.  ? Eat only one serving each day of meat, poultry, fish, or seafood.  ? When you prepare animal protein, cut pieces into small portion sizes. For most meat and fish, one serving is about the size of the palm of your hand.  · Eat at least five servings of fresh fruits and vegetables each day. To do this:  ? Keep fruits and vegetables on hand for snacks.  ? Eat one piece of fruit or a handful of berries with breakfast.  ? Have a salad and fruit at lunch.  ? Have two kinds of vegetables at dinner.  · Limit foods that are high in a substance called oxalate. These include:  ? Spinach (cooked), rhubarb, beets, sweet potatoes, and Swiss chard.  ? Peanuts.  ? Potato chips, french fries, and baked potatoes with skin on.  ? Nuts and nut products.  ? Chocolate.  · If you regularly take a diuretic medicine, make sure to eat at least 1 or 2 servings of fruits or vegetables that are high in potassium each day. These include:  ? Avocado.  ? Banana.  ? Orange, prune, carrot, or tomato juice.  ? Baked potato.  ? Cabbage.  ? Beans and split peas.  Lifestyle    · Drink enough fluid to keep your urine pale yellow. This is the most important thing you can do. Spread your fluid intake  throughout the day.  · If you drink alcohol:  ? Limit how much you use to:  § 0-1 drink a day for women who are not pregnant.  § 0-2 drinks a day for men.  ? Be aware of how much alcohol is in your drink. In the [place] one drink equals one 12 oz bottle of beer (355 mL), one 5 oz glass of wine (148 mL), or one 1½ oz glass of hard liquor (44 mL).  · Lose weight if told by your health care provider. Work with your dietitian to find an eating plan and weight loss strategies that work best for you.    General information  · Talk to your health care provider and dietitian about taking daily supplements. You may be told the following depending on your health and the cause of your kidney stones:  ? Not to take supplements with vitamin C.  ? To take a calcium supplement.  ? To take a daily probiotic supplement.  ? To take other supplements such as magnesium, fish oil, or vitamin B6.  · Take over-the-counter and prescription medicines only as told by your health care provider. These include supplements.  What foods should I limit?  Limit your intake of the following foods, or eat them as told by your dietitian.  Vegetables  Spinach. Rhubarb. Beets. Canned vegetables. Pickles. Olives. Baked potatoes with skin.  Grains  Wheat bran. Baked goods. Salted crackers. Cereals high in sugar.  Meats and other proteins  Nuts. Nut butters. Large portions of meat, poultry, or fish. Salted, precooked, or cured meats, such as sausages, meat loaves, and hot dogs.  Dairy  Cheese.  Beverages  Regular soft drinks. Regular vegetable juice.  Seasonings and condiments  Seasoning blends with salt. Salad dressings. Soy sauce. Ketchup. Barbecue sauce.  Other foods  Canned soups. Canned pasta sauce. Casseroles. Pizza. Lasagna. Frozen meals. Potato chips. French fries.  The items listed above may not be a complete list of foods and beverages you should limit. Contact a dietitian for more information.  What foods should I avoid?  Talk to your dietitian  about specific foods you should avoid based on the type of kidney stones you have and your overall health.  Fruits  Grapefruit.  The item listed above may not be a complete list of foods and beverages you should avoid. Contact a dietitian for more information.  Summary  · Kidney stones are deposits of minerals and salts that form inside your kidneys.  · You can lower your risk of kidney stones by making changes to your diet.  · The most important thing you can do is drink enough fluid. Drink enough fluid to keep your urine pale yellow.  · Talk to your dietitian about how much calcium you should have each day, and eat less salt and animal protein as told by your dietitian.  This information is not intended to replace advice given to you by your health care provider. Make sure you discuss any questions you have with your health care provider.  Document Revised: 12/10/2020 Document Reviewed: 12/10/2020  [employer] Patient Education © 2021 [employer]      Constipation, Adult  Constipation is when a person has trouble pooping (having a bowel movement). When you have this condition, you may poop fewer than 3 times a week. Your poop (stool) may also be dry, hard, or bigger than normal.  Follow these instructions at home:  Eating and drinking    · Eat foods that have a lot of fiber, such as:  ? Fresh fruits and vegetables.  ? Whole grains.  ? Beans.  · Eat less of foods that are low in fiber and high in fat and sugar, such as:  ? French fries.  ? Hamburgers.  ? Cookies.  ? Candy.  ? Soda.  · Drink enough fluid to keep your pee (urine) pale yellow.    General instructions  · Exercise regularly or as told by your doctor. Try to do 150 minutes of exercise each week.  · Go to the restroom when you feel like you need to poop. Do not hold it in.  · Take over-the-counter and prescription medicines only as told by your doctor. These include any fiber supplements.  · When you poop:  ? Do deep breathing while relaxing your lower  belly (abdomen).  ? Relax your pelvic floor. The pelvic floor is a group of muscles that support the rectum, bladder, and intestines (as well as the uterus in women).  · Watch your condition for any changes. Tell your doctor if you notice any.  · Keep all follow-up visits as told by your doctor. This is important.  Contact a doctor if:  · You have pain that gets worse.  · You have a fever.  · You have not pooped for 4 days.  · You vomit.  · You are not hungry.  · You lose weight.  · You are bleeding from the opening of the butt (anus).  · You have thin, pencil-like poop.  Get help right away if:  · You have a fever, and your symptoms suddenly get worse.  · You leak poop or have blood in your poop.  · Your belly feels hard or bigger than normal (bloated).  · You have very bad belly pain.  · You feel dizzy or you faint.  Summary  · Constipation is when a person poops fewer than 3 times a week, has trouble pooping, or has poop that is dry, hard, or bigger than normal.  · Eat foods that have a lot of fiber.  · Drink enough fluid to keep your pee (urine) pale yellow.  · Take over-the-counter and prescription medicines only as told by your doctor. These include any fiber supplements.  This information is not intended to replace advice given to you by your health care provider. Make sure you discuss any questions you have with your health care provider.  Document Revised: 11/04/2020 Document Reviewed: 11/04/2020  [employer] Patient Education © 2021 [employer]    Flank Pain, Adult  Flank pain is pain in your side. The flank is the area of your side between your upper belly (abdomen) and your back. The pain may occur over a short time (acute), or it may be long-term or come back often (chronic). It may be mild or very bad. Pain in this area can be caused by many different things.  Follow these instructions at home:    · Drink enough fluid to keep your pee (urine) clear or pale yellow.  · Rest as told by your doctor.  · Take  over-the-counter and prescription medicines only as told by your doctor.  · Keep a journal to keep track of:  ? What has caused your flank pain.  ? What has made it feel better.  · Keep all follow-up visits as told by your doctor. This is important.  Contact a doctor if:  · Medicine does not help your pain.  · You have new symptoms.  · Your pain gets worse.  · You have a fever.  · Your symptoms last longer than 2-3 days.  · You have trouble peeing.  · You are peeing more often than normal.  Get help right away if:  · You have trouble breathing.  · You are short of breath.  · Your belly hurts, or it is swollen or red.  · You feel sick to your stomach (nauseous).  · You throw up (vomit).  · You feel like you will pass out, or you do pass out (faint).  · You have blood in your pee.  Summary  · Flank pain is pain in your side. The flank is the area of your side between your upper belly (abdomen) and your back.  · Flank pain may occur over a short time (acute), or it may be long-term or come back often (chronic). It may be mild or very bad.  · Pain in this area can be caused by many different things.  · Contact your doctor if your symptoms get worse or they last longer than 2-3 days.  This information is not intended to replace advice given to you by your health care provider. Make sure you discuss any questions you have with your health care provider.  Document Revised: 11/30/2018 Document Reviewed: 04/09/2018  [employer] Patient Education © 2021 [employer]

## 2022-01-28 NOTE — PROGRESS NOTES
Chief Complaint  Benign Prostatic Hypertrophy/FLANK PAIN/IBS (3 MONTH FOLLOW UP)    Subjective          Jaquan Mckay presents to [facility]  History of Present Illness    Mr. Jaquan Mckay is a pleasant 50-year-old male, a poor historian, with a significant history of BPH with incomplete bladder emptying, who returns to clinic today for follow-up. Patient reports significant concerns with flank pain, and abdominal discomfort that has remained very bothersome to him.  His most recent PSA was 1.0 on July 1 of 2021, his PSA 2 years ago was 0.8.  Patient was evaluated in ER over ONE month ago for constipation, and referred to see GI.  Was also placed on Flomax, initially repeat ported noncompliance due to allergy, but reports recently has been taking his medications and voiding well.      He however returns to clinic today in apparent discomfort, complaining of bilateral flank pain, lower abdominal pain, with increased difficulty urinating, and increased abdominal pressure.  We both reviewed/discussed his KUB results today which show onlay faint right-sided renal stone, with no left renal stones visible, no stones identified along the expected course of the ureters.  He still has significant stool burden characterized by extreme amounts of constipation.  We discussed the impact of this on his bladder discomfort, his flank pain and his abdominal pain. We discussed his degenerative changes noted in his lumbar spine which was stable on his last CT.       He denies any issues with urine frequency, urgency, dysuria, he denies nocturia, he denies any burning on urination.  He has significant pelvic pressure with suprapubic discomfort, bilateral flank pain, lower back pain, but denies any CVA tenderness.  His urine dipstick today is completely negative for any infection, it is negative for gross/microscopic hematuria.  His PVR is 4 cc, much improved from his 210 cc prior, his IPSS  "score is 8.    Objective   Vital Signs:   Ht 170.2 cm (67.01\")   Wt 107 kg (235 lb)   BMI 36.80 kg/m²     Physical Exam  Constitutional:       General: He is in acute distress.      Appearance: He is well-developed. He is obese. He is ill-appearing.   HENT:      Head: Normocephalic and atraumatic.      Right Ear: External ear normal.      Left Ear: External ear normal.   Eyes:      General:         Right eye: No discharge.         Left eye: No discharge.      Conjunctiva/sclera: Conjunctivae normal.      Pupils: Pupils are equal, round, and reactive to light.   Neck:      Thyroid: No thyromegaly.      Trachea: No tracheal deviation.   Cardiovascular:      Rate and Rhythm: Normal rate and regular rhythm.      Heart sounds: No murmur heard.  No friction rub.   Pulmonary:      Effort: Pulmonary effort is normal. No respiratory distress.      Breath sounds: Normal breath sounds. No stridor.   Abdominal:      General: Bowel sounds are normal. There is distension.      Palpations: Abdomen is soft.      Tenderness: There is abdominal tenderness. There is guarding.   Genitourinary:     Penis: Normal and uncircumcised. No tenderness or discharge.       Testes: Normal.      Rectum: Normal. Guaiac result negative.   Musculoskeletal:         General: Tenderness (  LOWER BACK PAIN) present. No deformity. Normal range of motion.      Cervical back: Normal range of motion and neck supple.   Skin:     General: Skin is warm and dry.      Capillary Refill: Capillary refill takes less than 2 seconds.      Coloration: Skin is pale.   Neurological:      Mental Status: He is alert and oriented to person, place, and time.      Cranial Nerves: No cranial nerve deficit.      Coordination: Coordination normal.   Psychiatric:         Behavior: Behavior normal.         Thought Content: Thought content normal.         Judgment: Judgment normal.      IPSS Questionnaire (AUA-7):  Over the past month…    1)  How often have you had a sensation of " not emptying your bladder completely after you finish urinating?  1 - Less than 1 time in 5   2)  How often have you had to urinate again less than two hours after you finished urinating? 1 - Less than 1 time in 5   3)  How often have you found you stopped and started again several times when you urinated?  2 - Less than half the time   4) How difficult have you found it to postpone urination?  1 - Less than 1 time in 5   5) How often have you had a weak urinary stream?  1 - Less than 1 time in 5   6) How often have you had to push or strain to begin urination?  1 - Less than 1 time in 5   7) How many times did you most typically get up to urinate from the time you went to bed until the time you got up in the morning?  1 - 1 time   Total score:  0-7 mildly symptomatic    8-19 moderately symptomatic                                                      8    20-35 severely symptomatic       Result Review :     CMP    CMP 6/10/21 7/17/21 10/9/21   Glucose 97 89 155 (A)   BUN 13 14 12   Creatinine 1.22 1.12 1.22   eGFR Non African Am 63 70 63   Sodium 139 134 (A) 134 (A)   Potassium 4.0 4.7 3.8   Chloride 105 100 98   Calcium 9.1 9.3 8.5 (A)   Albumin 4.07 4.40 4.18   Total Bilirubin 0.2 0.3 0.2   Alkaline Phosphatase 98 108 123 (A)   AST (SGOT) 32 25 75 (A)   ALT (SGPT) 21 25 64 (A)   (A) Abnormal value       Comments are available for some flowsheets but are not being displayed.           CBC w/diff    CBC w/Diff 7/1/21 7/17/21 10/9/21   WBC 5.5 7.79 2.97 (A)   RBC 4.45 5.12 4.86   Hemoglobin 14.6 16.8 15.7   Hematocrit 43.0 47.8 44.7   MCV 97 93.4 92.0   MCH 32.8 32.8 32.3   MCHC 34.0 35.1 35.1   RDW 12.8 12.6 11.9 (A)   Platelets 204 193 143   Neutrophil Rel %  59.6 33.1 (A)   Immature Granulocyte Rel %  0.1 0.0   Lymphocyte Rel %  25.3 43.4   Monocyte Rel %  13.7 (A) 21.9 (A)   Eosinophil Rel %  0.9 1.3   Basophil Rel %  0.4 0.3   (A) Abnormal value            Renal Profile    Renal Profile 6/10/21 7/17/21 10/9/21    BUN 13 14 12   Creatinine 1.22 1.12 1.22   eGFR Non African Am 63 70 63           UA    Urinalysis 7/17/21 7/21/21 1/19/22   Specific [place], UA 1.008     Ketones, UA Negative Negative Negative   Blood, UA Negative     Leukocytes, UA Negative Negative Negative   Nitrite, UA Negative             Urine Culture    Urine Culture 7/21/21 1/28/22   Urine Culture Final report Final report           PSA    PSA 7/1/21   PSA 1.0      Comments are available for some flowsheets but are not being displayed.           Covid Tests    Common Labsle 10/9/21   COVID19 Detected (A)   (A) Abnormal value            Data reviewed: Radiologic studies KUB done today remarkable for faint right-sided calcifications, moderate stool burden          Assessment and Plan    Diagnoses and all orders for this visit:    1. BPH without obstruction/lower urinary tract symptoms (Primary)  -     Urine Culture - Urine, Urine, Clean Catch  -     XR abdomen kub  -     XR abdomen kub; Future  -     ketorolac (TORADOL) 10 MG tablet; Take 1 tablet by mouth Every 6 (Six) Hours As Needed for Moderate Pain .  Dispense: 16 tablet; Refill: 0  -     magnesium citrate 1.745 GM/30ML solution solution; Take 296 mL by mouth Take As Directed for 2 doses. Take one full bottle at 4:00 PM and take second bottle at 10:00 PM.  Dispense: 592 mL; Refill: 0    2. Bilateral flank pain  -     XR abdomen kub  -     XR abdomen kub; Future  -     ketorolac (TORADOL) 10 MG tablet; Take 1 tablet by mouth Every 6 (Six) Hours As Needed for Moderate Pain .  Dispense: 16 tablet; Refill: 0  -     magnesium citrate 1.745 GM/30ML solution solution; Take 296 mL by mouth Take As Directed for 2 doses. Take one full bottle at 4:00 PM and take second bottle at 10:00 PM.  Dispense: 592 mL; Refill: 0  -     ketorolac (TORADOL) injection 30 mg    3. Renal calculus  -     XR abdomen kub  -     XR abdomen kub; Future  -     ketorolac (TORADOL) 10 MG tablet; Take 1 tablet by mouth Every 6 (Six)  Hours As Needed for Moderate Pain .  Dispense: 16 tablet; Refill: 0  -     magnesium citrate 1.745 GM/30ML solution solution; Take 296 mL by mouth Take As Directed for 2 doses. Take one full bottle at 4:00 PM and take second bottle at 10:00 PM.  Dispense: 592 mL; Refill: 0  -     ketorolac (TORADOL) injection 30 mg    4. Incomplete bladder emptying  -     Bladder Scan  -     XR abdomen kub; Future  -     ketorolac (TORADOL) 10 MG tablet; Take 1 tablet by mouth Every 6 (Six) Hours As Needed for Moderate Pain .  Dispense: 16 tablet; Refill: 0  -     magnesium citrate 1.745 GM/30ML solution solution; Take 296 mL by mouth Take As Directed for 2 doses. Take one full bottle at 4:00 PM and take second bottle at 10:00 PM.  Dispense: 592 mL; Refill: 0    5. Frequency of urination  -     POC Urinalysis Dipstick, Automated    6. Chronic idiopathic constipation  -     magnesium citrate 1.745 GM/30ML solution solution; Take 296 mL by mouth Take As Directed for 2 doses. Take one full bottle at 4:00 PM and take second bottle at 10:00 PM.  Dispense: 592 mL; Refill: 0    7. History of colon polyps  -     magnesium citrate 1.745 GM/30ML solution solution; Take 296 mL by mouth Take As Directed for 2 doses. Take one full bottle at 4:00 PM and take second bottle at 10:00 PM.  Dispense: 592 mL; Refill: 0    8. Diarrhea, unspecified type  -     magnesium citrate 1.745 GM/30ML solution solution; Take 296 mL by mouth Take As Directed for 2 doses. Take one full bottle at 4:00 PM and take second bottle at 10:00 PM.  Dispense: 592 mL; Refill: 0    9. Generalized abdominal pain  -     magnesium citrate 1.745 GM/30ML solution solution; Take 296 mL by mouth Take As Directed for 2 doses. Take one full bottle at 4:00 PM and take second bottle at 10:00 PM.  Dispense: 592 mL; Refill: 0        ASSESSMENT  Pleasant patient with extensive polypharmacy, and significant past medical history, consistent with back pain, kidney stones, constipation, returns  to clinic today for evaluation.  Reports increased difficulty urinating, lower back pain and abdominal pain.  We both reviewed and discussed his KUB results today which are consistent with a faint nonobstructing right renal stones, and extensive stool burden.  His urine dipstick is completely negative for any infection it is negative for gross hematuria.    BPH: WE Discussed the pathophysiology of BPH and obstruction. We discussed the static and dynamic effects of BPH as well as using 5 alpha reductase inhibitors versus alpha blockade.  We discussed the indications for transurethral surgery as well.  And/ or other therapeutic options available including all of the newer techniques.  He has no real symptomatology referable to his prostate other than moderate enlargement.  Rather than proceed with an expensive workup he doesn't need, at this time I am recommending he continue with an alpha blocker tamsulosin 0.4 mg capsule daily-patient has been noncompliant    RIGHT RENAL STONES:Right Renal Calculus: We have discussed the various parameters regarding spontaneous passage including the notion that a tiny 1-4 mm stone like he has, have a 95% high likelihood of spontaneous passage versus a larger stonebeing caught up in the upper areas of the urinary tract. We also discussed the medical management of stone disease and the use of medical expulsive therapy in the form of Flomax. This is used in an off label setting. I also talked about nonoperative management including ambulation and increasing fluids and hot tub as being an effective adjuncts in the treatment of a stone.  We also discussed the absolute relative indicators for intervention including the presence of sepsis, and pain we cannot control as the primary need for urgent intervention.    IBS- Patient has significant irritable bowel syndrome, characterized by extreme amounts of constipation.  We spoke about the impact of this on bladder function.  We spoke about   its relationship to recurrent urinary tract infections.  We discussed the need for increasing p.o. fluid intake to at least 2 to 3 L of water daily, and discussed the physiology of colonic motility as well as use of MiraLAX as a bulk laxative versus the newer class of serotonin uptake blockers such as Linzess.  We stressed the need for a daily bowel movement and discussed the [place] stool scale at length.  Patient sees GI, will follow up with them.      PLAN  Toradol 30 mg IM given in clinic x1 dose.     We discussed treatment options for her flank pain t with patient encouraged to continue conservative therapy. Rest is the most important treatment in the case of flank pain. Rest and physical therapy are enough to improve minor pain.      Discussed to monitor his daily routine with prevention of flank pain by: At least Drinking 8 glasses of water per day, Limiting your alcohol consumption.  Having a healthy diet containing fruits, vegetables, and a lot of fluids, Practicing safe sex.  Also maintaining proper hygiene of your body as well as the environment.    He is to continue his daily bowel regiment as recommended by GI-noncompliance    Follow-up in clinic as discussed, may return sooner if worsening.    Patient is agreeable plan of care.    Patient reports that he is not currently experiencing any symptoms of urinary incontinence.     Patient's Body mass index is 36.01 kg/m². indicating that he is obese (BMI >30). Obesity-related health conditions include the following: hypertension, dyslipidemias and GERD. Obesity is improving with lifestyle modifications. BMI is 36.01. We discussed portion control and increasing exercise..     Smoking Cessation Counseling:  Current every day smoker. less than 3 minutes spent counseling. Will try to cut down.  I advised patient to quit tobacco use and offered support.  I provided patient with tobacco cessation educational material printed in the patient's After Visit Summary.       Follow Up   Return in about 6 months (around 7/28/2022) for Next scheduled follow up FOR BPH/FLANK PAIN WITH KUB PRIOR RIGHT RENAL STONE.  Patient was given instructions and counseling regarding his condition or for health maintenance advice. Please see specific information pulled into the AVS if appropriate.

## 2022-01-30 LAB
BACTERIA UR CULT: NORMAL
BACTERIA UR CULT: NORMAL

## 2022-01-31 PROBLEM — R33.9 INCOMPLETE BLADDER EMPTYING: Status: ACTIVE | Noted: 2022-01-31

## 2022-01-31 PROBLEM — N40.0 BPH WITHOUT OBSTRUCTION/LOWER URINARY TRACT SYMPTOMS: Status: ACTIVE | Noted: 2022-01-31

## 2022-01-31 PROBLEM — R10.9 BILATERAL FLANK PAIN: Status: ACTIVE | Noted: 2022-01-31

## 2022-02-02 ENCOUNTER — LAB (OUTPATIENT)
Dept: LAB | Facility: HOSPITAL | Age: 52
End: 2022-02-02

## 2022-02-02 PROCEDURE — 80053 COMPREHEN METABOLIC PANEL: CPT | Performed by: NURSE PRACTITIONER

## 2022-02-02 PROCEDURE — 80061 LIPID PANEL: CPT | Performed by: NURSE PRACTITIONER

## 2022-02-02 PROCEDURE — 85025 COMPLETE CBC W/AUTO DIFF WBC: CPT | Performed by: NURSE PRACTITIONER

## 2022-02-02 PROCEDURE — 82607 VITAMIN B-12: CPT | Performed by: NURSE PRACTITIONER

## 2022-02-02 PROCEDURE — 80186 ASSAY OF PHENYTOIN FREE: CPT | Performed by: NURSE PRACTITIONER

## 2022-02-02 PROCEDURE — 84443 ASSAY THYROID STIM HORMONE: CPT | Performed by: NURSE PRACTITIONER

## 2022-02-02 PROCEDURE — 84439 ASSAY OF FREE THYROXINE: CPT | Performed by: NURSE PRACTITIONER

## 2022-02-02 PROCEDURE — 83036 HEMOGLOBIN GLYCOSYLATED A1C: CPT | Performed by: NURSE PRACTITIONER

## 2022-02-02 PROCEDURE — 82306 VITAMIN D 25 HYDROXY: CPT | Performed by: NURSE PRACTITIONER

## 2022-02-02 PROCEDURE — 80185 ASSAY OF PHENYTOIN TOTAL: CPT | Performed by: NURSE PRACTITIONER

## 2022-02-03 ENCOUNTER — TREATMENT (OUTPATIENT)
Dept: PHYSICAL THERAPY | Facility: CLINIC | Age: 52
End: 2022-02-03

## 2022-02-03 ENCOUNTER — OFFICE VISIT (OUTPATIENT)
Dept: CARDIOLOGY | Facility: CLINIC | Age: 52
End: 2022-02-03

## 2022-02-03 VITALS
TEMPERATURE: 96 F | HEIGHT: 67 IN | SYSTOLIC BLOOD PRESSURE: 135 MMHG | OXYGEN SATURATION: 100 % | HEART RATE: 81 BPM | WEIGHT: 239 LBS | BODY MASS INDEX: 37.51 KG/M2 | DIASTOLIC BLOOD PRESSURE: 79 MMHG

## 2022-02-03 DIAGNOSIS — M25.522 LEFT ELBOW PAIN: Primary | ICD-10-CM

## 2022-02-03 DIAGNOSIS — R07.2 PRECORDIAL PAIN: ICD-10-CM

## 2022-02-03 DIAGNOSIS — I10 ESSENTIAL HYPERTENSION: Primary | ICD-10-CM

## 2022-02-03 DIAGNOSIS — M77.12 LATERAL EPICONDYLITIS OF LEFT ELBOW: ICD-10-CM

## 2022-02-03 DIAGNOSIS — R06.09 DYSPNEA ON EXERTION: ICD-10-CM

## 2022-02-03 PROCEDURE — 97035 APP MDLTY 1+ULTRASOUND EA 15: CPT | Performed by: PHYSICAL THERAPIST

## 2022-02-03 PROCEDURE — 99214 OFFICE O/P EST MOD 30 MIN: CPT | Performed by: NURSE PRACTITIONER

## 2022-02-03 PROCEDURE — 97110 THERAPEUTIC EXERCISES: CPT | Performed by: PHYSICAL THERAPIST

## 2022-02-03 PROCEDURE — 93000 ELECTROCARDIOGRAM COMPLETE: CPT | Performed by: NURSE PRACTITIONER

## 2022-02-03 PROCEDURE — 97530 THERAPEUTIC ACTIVITIES: CPT | Performed by: PHYSICAL THERAPIST

## 2022-02-03 RX ORDER — METHYLPREDNISOLONE 4 MG/1
4 TABLET ORAL DAILY
COMMUNITY
End: 2022-02-17

## 2022-02-03 NOTE — PROGRESS NOTES
Physical Therapy Daily Treatment Note      Patient: Jaquan Mckay   : 1970  Referring practitioner: Ronald S Dubin, MD  Date of Initial Visit: Type: THERAPY  Noted: 12/15/2021  Today's Date: 2/3/2022  Patient seen for 12 sessions       Visit Diagnoses:    ICD-10-CM ICD-9-CM   1. Left elbow pain  M25.522 719.42   2. Lateral epicondylitis of left elbow  M77.12 726.32       Subjective   Patient reports that she is having 7/10 pain in her left elbow. Patient states that he was a little sore following last treatment session.     Objective   See Exercise, Manual, and Modality Logs for complete treatment.       Assessment/Plan  Patient tolerated treatment session well with rest breaks taken as needed by the patient. Educated patient to perform therex per his tolerance, patient verbalized understanding. Treatment session consisted of exercises to improve strength and ROM in the left elbow. No adverse reactions with modalities or treatment session. Ultrasound performed to the medial epicondyle to help decrease swelling in the area. Decrease in pain noted following treatment session, 6/10 pain. Continue per PT's POC, progress exercises per the patient's tolerance.    Timed:         Manual Therapy:         mins  89203;     Therapeutic Exercise:    34     mins  62210;     Neuromuscular Jazmin:        mins  29395;    Therapeutic Activity:     11     mins  69193;     Gait Training:           mins  64164;     Ultrasound:     8     mins  38739;    Ionto                                   mins   60002  Self Care                            mins   41347  Canalith Repos         mins 27923      Un-Timed:  Electrical Stimulation:         mins  59124 (MC );  Dry Needling          mins self-pay  Traction          mins [id number]      Timed Treatment:   53   mins   Total Treatment:    53    mins    Radha Martin PTA  [place] License: [id number]  Electronically signed by Radha Martin PTA, 22, 1:54 PM  EST.

## 2022-02-03 NOTE — PROGRESS NOTES
Tiera Valenzuela APRN  Jaquan Mckay  1970 02/03/2022    Patient Active Problem List   Diagnosis   • Gastroesophageal reflux disease without esophagitis   • Essential hypertension   • Seizures (HCC)   • Hyperlipidemia   • Anxiety   • Varicocele   • Varicocele present on ultrasound of scrotum   • Chronic bilateral low back pain with left-sided sciatica   • Seasonal allergic rhinitis due to pollen   • Mild persistent asthma without complication   • Precordial pain   • Dysuria   • Congenital coronary artery anomaly   • BMI 35.0-35.9,adult   • Chondromalacia, knee   • Achilles tendinitis of both lower extremities   • Muscle spasm of both lower legs   • Personal history of allergy to shellfish   • Sensation of cold in lower extremity   • Varicose vein of leg   • Heart palpitations   • Shortness of breath   • Generalized anxiety disorder   • Chronic elbow pain, right   • Chest pain   • Unstable angina (HCC)   • ASCVD (arteriosclerotic cardiovascular disease)   • Elevated prolactin level   • Other constipation   • Class 1 obesity due to excess calories with serious comorbidity and body mass index (BMI) of 33.0 to 33.9 in adult   • Bacteremia   • Therapeutic opioid-induced constipation (OIC)   • Rectal bleeding   • Bloating   • Generalized abdominal pain   • History of colon polyps   • Lateral epicondylitis of right elbow   • Psychophysiological insomnia   • Chronic rhinitis   • Vitamin D deficiency   • Renal calculus   • Chronic idiopathic constipation   • Diarrhea   • Bilateral flank pain   • BPH without obstruction/lower urinary tract symptoms   • Incomplete bladder emptying       DeaTiera Hernandez APRN:    Subjective     Chief Complaint   Patient presents with   • Med Management   • Chest Pain   • Shortness of Breath   • Edema   • Palpitations           History of Present Illness:    Jaquan Mckay is a 51 y.o. male with a past medical history of hypertension and dyslipidemia. He was last in our office in  "December of 2020. He presents today for cardiology follow up. He did have coronavirus in October of 2021. Since that time he has been having chest pains in the substernal region. These feel like a pressure and ripping pain that radiates into bilateral axilla. He has associated dizziness and lightheadedness. He cannot identify any aggravating or alleviating factors. He reports shortness of breath which is chronic. However, after having Covid he feels like his breathing is worse. He feels like he is smothering at night. He does have a history of Asthma. He reports 3 pillow orthopnea since having Covid. Reports intermittent leg edema. Denies weight gain.      Allergies   Allergen Reactions   • Ciprofloxacin Anaphylaxis and Hives   • Miralax [Polyethylene Glycol] Itching and Rash   • Mobic [Meloxicam] Other (See Comments)     Pt states, \"It make my feet and hands go numb and I can't hardly walk.\"    • Paxil [Paroxetine Hcl] Shortness Of Breath     Chest pain    • Peanut-Containing Drug Products Anaphylaxis   • Penicillins Anaphylaxis   • Pristiq [Desvenlafaxine Succinate Er] Dizziness   • Sulfa Antibiotics Anaphylaxis, Itching and Rash   • Doxycycline Hives   • Fish-Derived Products Hives   • Isosorbide Nitrate Rash     Rash, hives, had to use inhaler.    • Movantik [Naloxegol] Rash   • Seroquel [Quetiapine] Hives and Rash   • Buspar [Buspirone] Rash   • Clarithromycin Rash   • Clindamycin/Lincomycin Rash   • Codeine Rash   • Contrast Dye Itching and Rash   • Diltiazem Rash   • Flomax [Tamsulosin] Hives   • Gabapentin Rash   • Keflex [Cephalexin] Rash   • Linzess [Linaclotide] Rash   • Metoprolol Rash   • Prednisone Rash and Other (See Comments)     Face, feet, and legs go completely numb per patient   • Robitussin Cough+ Chest Max St [Dextromethorphan-Guaifenesin] Itching   • Shrimp (Diagnostic) Rash   • Spironolactone Rash   • Viibryd [Vilazodone Hcl] Itching and Rash   • Zoloft [Sertraline Hcl] Hives and Itching "   :      Current Outpatient Medications:   •  dexlansoprazole (Dexilant) 60 MG capsule, Take 1 capsule by mouth Daily., Disp: 30 capsule, Rfl: 5  •  Dilantin 100 MG capsule, TAKE 2 CAPSULES BY MOUTH 2 (TWO) TIMES A DAY., Disp: 120 capsule, Rfl: 5  •  famotidine (Pepcid) 40 MG tablet, Take 1 tablet by mouth Every Night., Disp: 30 tablet, Rfl: 5  •  ketorolac (TORADOL) 10 MG tablet, Take 1 tablet by mouth Every 6 (Six) Hours As Needed for Moderate Pain ., Disp: 16 tablet, Rfl: 0  •  lisinopril (PRINIVIL,ZESTRIL) 30 MG tablet, TAKE 1 TABLET BY MOUTH DAILY., Disp: 30 tablet, Rfl: 5  •  magnesium citrate 1.745 GM/30ML solution solution, Take 296 mL by mouth Take As Directed for 2 doses. Take one full bottle at 4:00 PM and take second bottle at 10:00 PM., Disp: 592 mL, Rfl: 0  •  methocarbamol (ROBAXIN) 750 MG tablet, TAKE 1 TABLET BY MOUTH 2 (TWO) TIMES A DAY AS NEEDED FOR MUSCLE SPASMS., Disp: 60 tablet, Rfl: 5  •  methylPREDNISolone (MEDROL) 4 MG tablet, Take 4 mg by mouth Daily., Disp: , Rfl:   •  mirtazapine (REMERON) 30 MG tablet, TAKE 1 AND 1/2 TABLETS BY MOUTH 2-3 HOURS BEFORE BEDTIME, Disp: 45 tablet, Rfl: 5  •  mupirocin (BACTROBAN) 2 % ointment, Apply  topically to the appropriate area as directed 3 (Three) Times a Day., Disp: 30 g, Rfl: 2  •  tamsulosin (FLOMAX) 0.4 MG capsule 24 hr capsule, , Disp: , Rfl:       The following portions of the patient's history were reviewed and updated as appropriate: allergies, current medications, past family history, past medical history, past social history, past surgical history and problem list.    Social History     Tobacco Use   • Smoking status: Former Smoker     Packs/day: 1.50     Years: 17.00     Pack years: 25.50     Types: Cigars, Cigarettes     Start date: 2010     Quit date: 2021     Years since quittin.6   • Smokeless tobacco: Never Used   Vaping Use   • Vaping Use: Never used   Substance Use Topics   • Alcohol use: No   • Drug use: No  "      ROS    Objective   Vitals:    02/03/22 1413   BP: 135/79   BP Location: Left arm   Patient Position: Sitting   Cuff Size: Adult   Pulse: 81   Temp: 96 °F (35.6 °C)   TempSrc: Infrared   SpO2: 100%   Weight: 108 kg (239 lb)   Height: 170.2 cm (67\")     Body mass index is 37.43 kg/m².        Vitals reviewed.   Constitutional:       Appearance: Healthy appearance. Well-developed and not in distress.   HENT:      Head: Normocephalic and atraumatic.   Pulmonary:      Effort: Pulmonary effort is normal.      Breath sounds: Normal breath sounds. No wheezing. No rales.   Cardiovascular:      Normal rate. Regular rhythm.      Murmurs: There is no murmur.      . No S3 and S4 gallop.   Edema:     Peripheral edema absent.   Abdominal:      General: Bowel sounds are normal.      Palpations: Abdomen is soft.   Skin:     General: Skin is warm and dry.   Neurological:      Mental Status: Alert, oriented to person, place, and time and oriented to person, place and time.   Psychiatric:         Mood and Affect: Mood normal.         Behavior: Behavior normal.         Lab Results   Component Value Date     02/02/2022    K 4.7 02/02/2022     02/02/2022    CO2 26.0 02/02/2022    BUN 18 02/02/2022    CREATININE 1.18 02/02/2022    GLUCOSE 98 02/02/2022    CALCIUM 9.1 02/02/2022    AST 31 02/02/2022    ALT 23 02/02/2022    ALKPHOS 97 02/02/2022    LABIL2 1.1 (L) 05/29/2016     Lab Results   Component Value Date    CKTOTAL 153 06/20/2019     Lab Results   Component Value Date    WBC 4.47 02/02/2022    HGB 16.2 02/02/2022    HCT 46.3 02/02/2022     02/02/2022     Lab Results   Component Value Date    INR 0.98 09/24/2019    INR 1.03 02/06/2018    INR 0.97 09/26/2017     Lab Results   Component Value Date    MG 1.6 08/21/2020     Lab Results   Component Value Date    TSH 3.000 02/02/2022    PSA 1.0 07/01/2021    CHLPL 232 (H) 04/05/2016    TRIG 69 02/02/2022    HDL 65 (H) 02/02/2022     (H) 02/02/2022      Lab " Results   Component Value Date    BNP 3.0 09/26/2018             ECG 12 Lead    Date/Time: 2/3/2022 2:06 PM  Performed by: Virginie Garcia APRN  Authorized by: Virginie Garcia APRN   Comparison: compared with previous ECG   Similar to previous ECG  Rhythm: sinus rhythm  BPM: 68                Assessment/Plan    Diagnosis Plan   1. Essential hypertension  ECG 12 Lead   2. Precordial pain  ECG 12 Lead    Stress Test With Myocardial Perfusion One Day   3. Dyspnea on exertion  ECG 12 Lead    Adult Transthoracic Echo Complete W/ Cont if Necessary Per Protocol                Recommendations:    1. Essential hypertension  a. Well controlled. Continue current dose of lisinopril  b. BMP reviewed, within normal limits  2. Precordial pain  a. Will evaluate further with lexiscan stress test. He cannot walk on treadmill due to bilateral knee pain  3. Dyspnea on exertion  a. Will evaluate LV function and tailor further therapy accordingly.  4. Follow up in 6 weeks or sooner if needed.         Return in about 6 weeks (around 3/17/2022) for Recheck.    As always, I appreciate very much the opportunity to participate in the cardiovascular care of your patients.      With Best Regards,    BALDOMERO Horowitz

## 2022-02-07 DIAGNOSIS — N40.0 BPH WITHOUT OBSTRUCTION/LOWER URINARY TRACT SYMPTOMS: Primary | ICD-10-CM

## 2022-02-08 RX ORDER — TAMSULOSIN HYDROCHLORIDE 0.4 MG/1
CAPSULE ORAL
Qty: 30 CAPSULE | Refills: 5 | Status: SHIPPED | OUTPATIENT
Start: 2022-02-08 | End: 2022-08-11

## 2022-02-10 ENCOUNTER — OFFICE VISIT (OUTPATIENT)
Dept: GASTROENTEROLOGY | Facility: CLINIC | Age: 52
End: 2022-02-10

## 2022-02-10 ENCOUNTER — TREATMENT (OUTPATIENT)
Dept: PHYSICAL THERAPY | Facility: CLINIC | Age: 52
End: 2022-02-10

## 2022-02-10 VITALS
HEART RATE: 83 BPM | BODY MASS INDEX: 37.26 KG/M2 | HEIGHT: 67 IN | SYSTOLIC BLOOD PRESSURE: 128 MMHG | WEIGHT: 237.4 LBS | DIASTOLIC BLOOD PRESSURE: 69 MMHG

## 2022-02-10 DIAGNOSIS — R10.84 GENERALIZED ABDOMINAL PAIN: Primary | ICD-10-CM

## 2022-02-10 DIAGNOSIS — K59.04 CHRONIC IDIOPATHIC CONSTIPATION: ICD-10-CM

## 2022-02-10 DIAGNOSIS — M25.522 LEFT ELBOW PAIN: Primary | ICD-10-CM

## 2022-02-10 DIAGNOSIS — R14.0 BLOATING: ICD-10-CM

## 2022-02-10 DIAGNOSIS — M77.12 LATERAL EPICONDYLITIS OF LEFT ELBOW: ICD-10-CM

## 2022-02-10 PROCEDURE — 97530 THERAPEUTIC ACTIVITIES: CPT | Performed by: PHYSICAL THERAPIST

## 2022-02-10 PROCEDURE — 99213 OFFICE O/P EST LOW 20 MIN: CPT | Performed by: PHYSICIAN ASSISTANT

## 2022-02-10 PROCEDURE — 97110 THERAPEUTIC EXERCISES: CPT | Performed by: PHYSICAL THERAPIST

## 2022-02-10 RX ORDER — DICYCLOMINE HYDROCHLORIDE 10 MG/1
10 CAPSULE ORAL
Qty: 120 CAPSULE | Refills: 11 | Status: SHIPPED | OUTPATIENT
Start: 2022-02-10

## 2022-02-10 NOTE — PROGRESS NOTES
Chief Complaint   Patient presents with   • Constipation       Jaquan Mckay is a 51 y.o. male who presents to the office today for evaluation of Constipation  .    HPI  Patient presents the clinic today for chronic constipation.  He was referred to us by urology who did a KUB that showed a large amount of stool in the right colon.  Patient states despite the x-ray results that he is having 2-3 bowel movements daily that range from type IV-V on the [place] stool scale.  He feels like he evacuates his colon well.  Patient admits he does not have good diet and is unwilling to change anything as he is a picky eater.  He does not want to take any type of medication that will help produce a bowel movement or cause diarrhea.  He has tried several in the past with bad results.  He states that he is still having abdominal pain and bloating especially after eating.    Review of Systems   Constitutional: Negative.    HENT: Negative for sore throat and trouble swallowing.    Eyes: Negative.    Respiratory: Negative for chest tightness.    Cardiovascular: Negative for chest pain.   Gastrointestinal: Positive for abdominal distention, abdominal pain, constipation and nausea. Negative for anal bleeding, blood in stool, diarrhea, rectal pain and vomiting.   Endocrine: Negative.    Genitourinary: Negative for difficulty urinating.   Musculoskeletal: Positive for back pain.   Skin: Negative.    Allergic/Immunologic: Positive for environmental allergies and food allergies.   Neurological: Positive for dizziness and headaches.   Hematological: Bruises/bleeds easily.   Psychiatric/Behavioral: Positive for sleep disturbance. The patient is nervous/anxious.        ACTIVE PROBLEMS:   Specialty Problems        Gastroenterology Problems    Gastroesophageal reflux disease without esophagitis        Rectal bleeding        Therapeutic opioid-induced constipation (OIC)        Chronic idiopathic constipation              PAST MEDICAL  "HISTORY:  Past Medical History:   Diagnosis Date   • Allergic    • Anxiety    • Arthritis    • Asthma    • Body piercing     REPORTS CYLICONE IN EARS   • Clotting disorder (HCC) 2004    had a knee surgery   • Coronary artery disease    • Depression    • DVT (deep venous thrombosis) (HCC)     RIGHT RIGHT KNEE AFTER SURGERY YEARS AGO IN 2001 OR 2004   • Elevated cholesterol    • Gastric ulcer    • GERD (gastroesophageal reflux disease)    • H/O migraine    • Headache    • Heart attack (HCC)     REPORTS \"LIGHT HEART ATTACK A LONG TIME AGO\"  \"EARLY 90'S\"   • History of seizures     REPORTS LAST EPISODE WAS AROUND 1995.   • Hostility    • Hyperlipidemia    • Hypertension    • Knee pain, acute     Left   • Low back pain    • Lyme disease    • Migraine    • MRSA (methicillin resistant Staphylococcus aureus)     REPORTS LAST TESTED + 2004. WAS TREATED HE REPORTS.  RIGHT ARM, RIGHT KNEE.   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    • Obesity    • Poor historian    • [place] spotted fever    • Seizures (HCC)    • Sleep apnea    • Tattoo    • Wears glasses        SURGICAL HISTORY:  Past Surgical History:   Procedure Laterality Date   • ABDOMINAL SURGERY     • BACK SURGERY     • BRAIN SURGERY  1986    Tumor removal    • CARDIAC CATHETERIZATION N/A 9/28/2018    Procedure: Left Heart Cath;  Surgeon: Leandro Daily MD;  Location: [facility] CATH INVASIVE LOCATION;  Service: Cardiology   • CHOLECYSTECTOMY     • COLONOSCOPY     • COLONOSCOPY N/A 8/2/2021    Procedure: COLONOSCOPY FOR SCREENING;  Surgeon: Irving Azar MD;  Location: [facility];  Service: Gastroenterology;  Laterality: N/A;   • CYST REMOVAL      pilonidal cyst   • ELBOW EPICONDYLECTOMY Right 7/23/2020    Procedure: LATERAL EPICONDYLAR RELEASE;  Surgeon: Jose Ruiz MD;  Location: [facility];  Service: Orthopedics;  Laterality: Right;   • ENDOSCOPY     • ENDOSCOPY N/A 8/2/2021    Procedure: ESOPHAGOGASTRODUODENOSCOPY WITH BIOPSY;  Surgeon: Doe" Irving Dela Cruz MD;  Location: [facility] OR;  Service: Gastroenterology;  Laterality: N/A;  esophageal dilatation to 20mm   • FRACTURE SURGERY Right     elbow   • KNEE ARTHROSCOPY Left 10/20/2017    Procedure: Diagnostic arthroscopy left knee with chondroplasty;  Surgeon: aMrco Aguirre MD;  Location: [facility];  Service:    • KNEE ARTHROSCOPY Left 2021    Procedure: KNEE DIAGNOSTIC ARTHROSCOPY WITH  CHONDROPLASTY patella, femoral and medial;  Surgeon: Raul Eagle MD;  Location: [facility] OR;  Service: Orthopedics;  Laterality: Left;   • KNEE SURGERY Right    • MOUTH SURGERY      FULL MOUTH EXTRACTION   • OTHER SURGICAL HISTORY      REPORTS 7 TICKS REMOVED FROM RIGHT ARM IN  OR    • TENNIS ELBOW RELEASE Right 2020    Procedure: RIGHT TENNIS ELBOW RELEASE;  Surgeon: Jose Ruiz MD;  Location: [facility];  Service: Orthopedics;  Laterality: Right;   • TUMOR EXCISION      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       FAMILY HISTORY:  Family History   Problem Relation Age of Onset   • Diabetes Mother    • Hypertension Mother    • Stroke Mother    • Diabetes Father    • Skin cancer Father    • Hypertension Father    • Heart attack Father    • Diabetes Brother    • Hypertension Brother    • Heart disease Maternal Aunt    • Heart disease Maternal Uncle    • Heart disease Paternal Aunt    • Heart disease Paternal Uncle    • Heart disease Maternal Grandmother    • Heart disease Maternal Grandfather    • Heart disease Paternal Grandmother    • Heart disease Paternal Grandfather        SOCIAL HISTORY:  Social History     Tobacco Use   • Smoking status: Former Smoker     Packs/day: 1.50     Years: 17.00     Pack years: 25.50     Types: Cigars, Cigarettes     Start date: 2010     Quit date: 2021     Years since quittin.7   • Smokeless tobacco: Never Used   Substance Use Topics   • Alcohol use: No       CURRENT MEDICATION:    Current Outpatient Medications:   •  dexlansoprazole  (Dexilant) 60 MG capsule, Take 1 capsule by mouth Daily., Disp: 30 capsule, Rfl: 5  •  Dilantin 100 MG capsule, TAKE 2 CAPSULES BY MOUTH 2 (TWO) TIMES A DAY., Disp: 120 capsule, Rfl: 5  •  famotidine (Pepcid) 40 MG tablet, Take 1 tablet by mouth Every Night., Disp: 30 tablet, Rfl: 5  •  ketorolac (TORADOL) 10 MG tablet, Take 1 tablet by mouth Every 6 (Six) Hours As Needed for Moderate Pain ., Disp: 16 tablet, Rfl: 0  •  lisinopril (PRINIVIL,ZESTRIL) 30 MG tablet, TAKE 1 TABLET BY MOUTH DAILY., Disp: 30 tablet, Rfl: 5  •  magnesium citrate 1.745 GM/30ML solution solution, Take 296 mL by mouth Take As Directed for 2 doses. Take one full bottle at 4:00 PM and take second bottle at 10:00 PM., Disp: 592 mL, Rfl: 0  •  methocarbamol (ROBAXIN) 750 MG tablet, TAKE 1 TABLET BY MOUTH 2 (TWO) TIMES A DAY AS NEEDED FOR MUSCLE SPASMS., Disp: 60 tablet, Rfl: 5  •  methylPREDNISolone (MEDROL) 4 MG tablet, Take 4 mg by mouth Daily., Disp: , Rfl:   •  mirtazapine (REMERON) 30 MG tablet, TAKE 1 AND 1/2 TABLETS BY MOUTH 2-3 HOURS BEFORE BEDTIME, Disp: 45 tablet, Rfl: 5  •  mupirocin (BACTROBAN) 2 % ointment, Apply  topically to the appropriate area as directed 3 (Three) Times a Day., Disp: 30 g, Rfl: 2  •  tamsulosin (FLOMAX) 0.4 MG capsule 24 hr capsule, TAKE 1 CAPSULE BY MOUTH DAILY., Disp: 30 capsule, Rfl: 5  •  dicyclomine (Bentyl) 10 MG capsule, Take 1 capsule by mouth 4 (Four) Times a Day Before Meals & at Bedtime., Disp: 120 capsule, Rfl: 11    ALLERGIES:  Ciprofloxacin, Miralax [polyethylene glycol], Mobic [meloxicam], Paxil [paroxetine hcl], Peanut-containing drug products, Penicillins, Pristiq [desvenlafaxine succinate er], Sulfa antibiotics, Doxycycline, Fish-derived products, Isosorbide nitrate, Movantik [naloxegol], Seroquel [quetiapine], Buspar [buspirone], Clarithromycin, Clindamycin/lincomycin, Codeine, Contrast dye, Diltiazem, Flomax [tamsulosin], Gabapentin, Keflex [cephalexin], Linzess [linaclotide], Metoprolol,  "Prednisone, Robitussin cough+ chest max st [dextromethorphan-guaifenesin], Shrimp (diagnostic), Spironolactone, Viibryd [vilazodone hcl], and Zoloft [sertraline hcl]    VISIT VITALS:  /69 (BP Location: Left arm, Patient Position: Sitting, Cuff Size: Adult)   Pulse 83   Ht 170.2 cm (67\")   Wt 108 kg (237 lb 6.4 oz)   BMI 37.18 kg/m²   Physical Exam  Constitutional:       General: He is not in acute distress.     Appearance: Normal appearance. He is well-developed.   HENT:      Head: Normocephalic and atraumatic.   Eyes:      Pupils: Pupils are equal, round, and reactive to light.   Cardiovascular:      Rate and Rhythm: Normal rate and regular rhythm.      Heart sounds: Normal heart sounds.   Pulmonary:      Effort: Pulmonary effort is normal. No respiratory distress.      Breath sounds: Normal breath sounds. No wheezing, rhonchi or rales.   Abdominal:      General: Abdomen is flat. Bowel sounds are normal. There is distension.      Palpations: Abdomen is soft. There is no mass.      Tenderness: There is abdominal tenderness. There is no guarding or rebound.      Hernia: No hernia is present.   Musculoskeletal:         General: No swelling. Normal range of motion.      Cervical back: Normal range of motion and neck supple.      Right lower leg: No edema.      Left lower leg: No edema.   Skin:     General: Skin is warm and dry.   Neurological:      Mental Status: He is alert and oriented to person, place, and time.   Psychiatric:         Attention and Perception: Attention normal.         Mood and Affect: Mood normal.         Speech: Speech normal.         Behavior: Behavior normal. Behavior is cooperative.         Thought Content: Thought content normal.           Assessment/Plan   Due to the patient's symptoms-it was recommended that he start on some type of medication to help with chronic constipation however he did not want to take any medication that would help produce a bowel movement.  He is having " abdominal pain and cramping especially after eating-we will prescribe Bentyl 10 mg up to 4 times daily as needed.  He was counseled on dietary changes as well to help with abdominal pain bloating.   Diagnosis Plan   1. Generalized abdominal pain  dicyclomine (Bentyl) 10 MG capsule   2. Chronic idiopathic constipation     3. Bloating         Return if symptoms worsen or fail to improve.                   This document has been electronically signed by Marisela Nix PA-C  February 11, 2022 08:10 EST    Part of this note may be an electronic transcription/translation of spoken language to printed text using the Dragon Dictation System.

## 2022-02-11 NOTE — PROGRESS NOTES
Physical Therapy Progress Report and  Plan of Care  Patient: Jaquan Mckay   : 1970  Diagnosis/ICD-10 Code:  Left elbow pain [M25.522]  Referring practitioner: Ronald S Dubin, MD  Date of Initial Visit: Type: THERAPY  Noted: 12/15/2021  Today's Date: 2/10/2022  Patient seen for 13 sessions         Visit Diagnoses:    ICD-10-CM ICD-9-CM   1. Left elbow pain  M25.522 719.42   2. Lateral epicondylitis of left elbow  M77.12 726.32         Jaquan Mckay reports:   Subjective Questionnaire: QuickDASH: 77.27  Clinical Progress: worse  Home Program Compliance: Yes  Treatment has included: therapeutic exercise, neuromuscular re-education, manual therapy, therapeutic activity, ultrasound, moist heat and cryotherapy      Subjective Evaluation    History of Present Illness    Subjective comment: Pt reports continued  pain and difficulty with right elbow.  He reports he wants to continue another 4 weeks of therapy to maximize his function. Pain  Current pain ratin  At best pain ratin  At worst pain ratin             Objective          Active Range of Motion     Left Elbow   Flexion: 135 degrees   Extension: 0 degrees   Forearm supination: 70 degrees   Forearm pronation: WFL    Strength/Myotome Testing     Left Elbow   Flexion: 4  Extension: 4  Forearm supination: 3+  Forearm pronation: 3+    Left Wrist/Hand      (2nd hand position)     Trial 1: 65 lbs    Trial 2: 75 lbs    Trial 3: 70 lbs    Average: 70 lbs          Assessment & Plan     Assessment  Impairments: abnormal or restricted ROM, activity intolerance, impaired physical strength, lacks appropriate home exercise program and pain with function  Functional Limitations: carrying objects, sleeping, pulling, pushing, uncomfortable because of pain, reaching behind back, reaching overhead and unable to perform repetitive tasks  Assessment details: Patient is a 51 year old male who comes to physical therapy for left elbow pain.  Pt has noted improved  elbow ROM, improved strength and mild decreased pain.   Patient reports a 77.27% functional mobility impairment, based on the patient's response to the QuickDash.  Therapy will cont to follow patient for improved mobility and decreased pain.  Prognosis: good    Goals  Plan Goals: STGs: 4 weeks  1. Patient will be independent/compliant with HEP.met  2. Patient will report pain no greater than 6/10 when performing self-care activities.not met  3. Left elbow AROM will improve to at least 10-90 to allow for greater ease with daily tasks.met    LTGs: 8 weeks  1. Patient will improve left  strength to at least 80# to allow for greater ease with opening items. Progressing (70# today)  2. Left elbow AROM will improve to WFL to allow for greater ease with daily tasks.not met  3. Patient will report left elbow pain no greater than 4/10 with moderate lifting.not met    Plan  Therapy options: will be seen for skilled therapy services  Planned modality interventions: cryotherapy, iontophoresis, electrical stimulation/Russian stimulation, TENS, thermotherapy (hydrocollator packs) and ultrasound  Planned therapy interventions: manual therapy, ADL retraining, neuromuscular re-education, body mechanics training, postural training, soft tissue mobilization, flexibility, functional ROM exercises, strengthening, stretching, home exercise program, therapeutic activities, IADL retraining, transfer training and joint mobilization  Frequency: 2x week  Duration in weeks: 4  Treatment plan discussed with: patient  Plan details: Moderate Evaluation  17090  Re-evaluation   74069    Therapeutic exercise  93684  Therapeutic activity    25511  Neuromuscular re-education   29621  Manual therapy   66622    Unattended e-stim (Medicaid/Medicare)     Moist heat/cryotherapy 25905   Ultrasound   35764  Iontophoresis   72433               Recommendations: Continue as planned  Timeframe: 1 month  Prognosis to achieve goals: good      Timed:          Manual Therapy:         mins  31136;     Therapeutic Exercise:  30     mins  14443;     Neuromuscular Jazmin:        mins  00593;    Therapeutic Activity:    10      mins  88149;     Gait Training:           mins  14145;     Ultrasound:      8    mins  73081;    Ionto                                   mins   24188  Self Care                            mins   98105  Canalith Repos         mins 22922      Un-Timed:  Electrical Stimulation:         mins  06850 ( );  Dry Needling          mins self-pay  Traction          mins 18508  Re-Eval                               mins  00154      Timed Treatment: 48    mins   Total Treatment:    48    mins        Dubin, Ronald S, MD  NPI: [id number]      Session assisted by:  NEIL Baker PT   License number:  [id number]    Electronically signed by Sintia Rodriguez PT, 02/10/22, 7:10 PM EST    Certification Period: 2/10/2022 thru 5/10/2022  I certify that the therapy services are furnished while this patient is under my care.  The services outlined above are required by this patient, and will be reviewed every 90 days.         Physician Signature:__________________________________________________    PHYSICIAN: Dubin, Ronald S, MD  NPI: [id number]                                      DATE:  :     Please sign and return via fax to [email or web address] . Thank you, [facility]

## 2022-02-17 ENCOUNTER — OFFICE VISIT (OUTPATIENT)
Dept: FAMILY MEDICINE CLINIC | Facility: CLINIC | Age: 52
End: 2022-02-17

## 2022-02-17 VITALS
HEART RATE: 90 BPM | HEIGHT: 67 IN | SYSTOLIC BLOOD PRESSURE: 122 MMHG | WEIGHT: 231.6 LBS | OXYGEN SATURATION: 98 % | TEMPERATURE: 97.9 F | DIASTOLIC BLOOD PRESSURE: 84 MMHG | RESPIRATION RATE: 18 BRPM | BODY MASS INDEX: 36.35 KG/M2

## 2022-02-17 DIAGNOSIS — I10 ESSENTIAL HYPERTENSION: ICD-10-CM

## 2022-02-17 DIAGNOSIS — I83.813 VARICOSE VEINS OF BOTH LOWER EXTREMITIES WITH PAIN: ICD-10-CM

## 2022-02-17 DIAGNOSIS — E55.9 VITAMIN D DEFICIENCY: ICD-10-CM

## 2022-02-17 DIAGNOSIS — Z00.00 VISIT FOR ANNUAL HEALTH EXAMINATION: Primary | ICD-10-CM

## 2022-02-17 DIAGNOSIS — J45.30 MILD PERSISTENT ASTHMA WITHOUT COMPLICATION: ICD-10-CM

## 2022-02-17 DIAGNOSIS — K21.9 GASTROESOPHAGEAL REFLUX DISEASE WITHOUT ESOPHAGITIS: ICD-10-CM

## 2022-02-17 DIAGNOSIS — F51.04 PSYCHOPHYSIOLOGICAL INSOMNIA: ICD-10-CM

## 2022-02-17 DIAGNOSIS — J34.89 NASAL SORE: ICD-10-CM

## 2022-02-17 DIAGNOSIS — J32.1 CHRONIC FRONTAL SINUSITIS: ICD-10-CM

## 2022-02-17 PROCEDURE — 99396 PREV VISIT EST AGE 40-64: CPT | Performed by: NURSE PRACTITIONER

## 2022-02-17 RX ORDER — ASPIRIN 81 MG/1
TABLET ORAL DAILY
COMMUNITY
Start: 2021-09-21 | End: 2022-02-17 | Stop reason: SDUPTHER

## 2022-02-17 RX ORDER — FLUTICASONE PROPIONATE 50 MCG
SPRAY, SUSPENSION (ML) NASAL
COMMUNITY
Start: 2022-02-09

## 2022-02-17 RX ORDER — MONTELUKAST SODIUM 10 MG/1
10 TABLET ORAL
COMMUNITY

## 2022-02-17 RX ORDER — AZELASTINE 1 MG/ML
SPRAY, METERED NASAL
COMMUNITY
Start: 2022-02-08

## 2022-02-17 RX ORDER — AZITHROMYCIN 250 MG/1
TABLET, FILM COATED ORAL
Qty: 6 TABLET | Refills: 0 | Status: SHIPPED | OUTPATIENT
Start: 2022-02-17 | End: 2022-03-23

## 2022-02-17 RX ORDER — POTASSIUM CHLORIDE 750 MG/1
TABLET, EXTENDED RELEASE ORAL
COMMUNITY
Start: 2022-02-07 | End: 2022-06-20

## 2022-02-17 RX ORDER — FAMOTIDINE 40 MG/1
40 TABLET, FILM COATED ORAL 2 TIMES DAILY
Qty: 60 TABLET | Refills: 5 | Status: SHIPPED | OUTPATIENT
Start: 2022-02-17 | End: 2022-10-10 | Stop reason: SDUPTHER

## 2022-02-17 RX ORDER — ERGOCALCIFEROL 1.25 MG/1
CAPSULE ORAL
COMMUNITY
Start: 2022-02-07 | End: 2022-04-20 | Stop reason: SDUPTHER

## 2022-02-17 RX ORDER — ASPIRIN 81 MG/1
81 TABLET ORAL DAILY
Qty: 30 TABLET | Refills: 5 | Status: SHIPPED | OUTPATIENT
Start: 2022-02-17 | End: 2022-09-30 | Stop reason: SDUPTHER

## 2022-02-17 RX ORDER — LORATADINE 10 MG/1
TABLET ORAL
COMMUNITY
Start: 2022-02-07 | End: 2022-06-20

## 2022-02-17 RX ORDER — ALBUTEROL SULFATE 90 UG/1
AEROSOL, METERED RESPIRATORY (INHALATION)
COMMUNITY
Start: 2021-10-21

## 2022-02-17 RX ORDER — ASENAPINE MALEATE 2.5 MG/1
1 TABLET SUBLINGUAL NIGHTLY
Qty: 30 TABLET | Refills: 0 | Status: SHIPPED | OUTPATIENT
Start: 2022-02-17 | End: 2022-03-23 | Stop reason: SDUPTHER

## 2022-02-17 NOTE — PROGRESS NOTES
"Subjective   Jaquan Mckay is a 51 y.o. male.     Chief Complaint   Patient presents with   • Annual Exam       History of Present Illness     Annual physical-here for an annual physical.  HTN-chronic and ongoing.   He reports that he has noted some elevations but has had a cardio appt.  Occasional CP.  No palpitations.  He will be having a stress test for further evaluation.    Obesity-has note approx 6 #.  He reports he has been eating less overall.   He does try to be active.    Grief-anniversary of his wife's passing was yesterday.   He has been under the care of [facility] and had a virtual appt last week.   Ortho follow up-had an appt in [place] yesterday for a second opinion.   He reports a fall last week after his knee popped and went down.  He did not go for evaluation.  He has had injections.  They did not help.  Patient reports a bakers cyst related to his left knee.  He was noted to have some mild OA changes on yesterdays xray.    Cardiology follow up-has been to see BALDOMERO Garcia.  He will be having further testing including a stress test.   He did not have any medication changes.   He reports he had some CP yesterday that was mid chest and thru into his back with radiation into his neck and head.  \"felt like a vice\".    Sinus pressure-in his nose and reports even \"my gums are sore\".  Nasal congestion but is using nose spray. Left ear pain is present.  Some throat irritation in the AM but it resolves in the AM.  He has some PND and cough.   He continues to be off his allergy injections and would like to be re-referred to specialist to resume his injections.  He was advised by ENT in [place] he need to resume.  He continues to have nasal sores but reports he has only been using vasoline as his Bactroban did not get to the pharmacy. He has been using some nasal saline gel from samples here at the office.   He was further advised he needed to have hearing aides.    GERD-some mild increase.  On Dexilant " 60 mg and Pepcid.   He is taking as directed.  He has not been eating differently than his usual.   Insomnia-he is taking Remeron 30 mg up to 2 tablets but continues not to sleep well.   He would like medication to help with his insomnia.   He is allergic to multiple medications and has failed others.  He is under the care of [facility].  It is near the anniversary of his wife's passing and he does struggle more with symptoms in the winter.     The following portions of the patient's history were reviewed and updated as appropriate: CC, ROS, allergies, current medications, past family history, past medical history, past social history, past surgical history and problem list.      Review of Systems   Constitutional: Positive for fatigue. Negative for activity change, appetite change and fever.   HENT: Positive for congestion, ear pain, rhinorrhea and sinus pressure. Negative for facial swelling, nosebleeds, sore throat, trouble swallowing and voice change.         Nasal sores   Eyes: Negative for blurred vision, double vision, redness and visual disturbance.   Respiratory: Positive for shortness of breath (intermittent). Negative for cough, chest tightness and wheezing.    Cardiovascular: Positive for chest pain. Negative for palpitations and leg swelling.   Gastrointestinal: Negative for abdominal pain, blood in stool, constipation, diarrhea, vomiting and indigestion.   Endocrine: Negative.    Genitourinary: Negative for dysuria, flank pain, frequency, hematuria and urgency.   Musculoskeletal: Positive for arthralgias, back pain and myalgias. Negative for gait problem.   Skin: Negative.    Allergic/Immunologic: Negative.    Neurological: Negative for dizziness, weakness, light-headedness and headache. Memory difficulty: occasional.   Hematological: Negative.    Psychiatric/Behavioral: Positive for dysphoric mood and stress. Negative for self-injury, sleep disturbance and suicidal ideas. The patient is  "nervous/anxious.    All other systems reviewed and are negative.      Objective     /84   Pulse 90   Temp 97.9 °F (36.6 °C)   Resp 18   Ht 170.2 cm (67.01\")   Wt 105 kg (231 lb 9.6 oz)   SpO2 98%   BMI 36.27 kg/m²     Physical Exam  Vitals reviewed.   Constitutional:       General: He is not in acute distress.     Appearance: He is well-developed. He is obese. He is not diaphoretic.   HENT:      Head: Normocephalic and atraumatic.      Jaw: No tenderness.      Comments: Oropharynx not examined.  Patient is presently wearing a face covering/mask due to COVID-19 pandemic.     Right Ear: Hearing, tympanic membrane, ear canal and external ear normal.      Left Ear: Hearing, tympanic membrane, ear canal and external ear normal.   Eyes:      General: Lids are normal. No scleral icterus.     Extraocular Movements:      Right eye: Normal extraocular motion and no nystagmus.      Left eye: Normal extraocular motion and no nystagmus.      Conjunctiva/sclera: Conjunctivae normal.      Pupils: Pupils are equal, round, and reactive to light.   Neck:      Thyroid: No thyromegaly or thyroid tenderness.      Vascular: No carotid bruit or JVD.      Trachea: No tracheal tenderness.   Cardiovascular:      Rate and Rhythm: Normal rate and regular rhythm.      Pulses:           Dorsalis pedis pulses are 2+ on the right side and 2+ on the left side.        Posterior tibial pulses are 2+ on the right side and 2+ on the left side.      Heart sounds: Normal heart sounds, S1 normal and S2 normal. No murmur heard.      Pulmonary:      Effort: Pulmonary effort is normal. No accessory muscle usage, prolonged expiration or respiratory distress.      Breath sounds: Normal breath sounds.   Chest:      Chest wall: No tenderness.   Abdominal:      General: Bowel sounds are normal. There is no distension.      Palpations: Abdomen is soft. There is no mass.      Tenderness: There is no abdominal tenderness.   Musculoskeletal:      " Cervical back: Normal range of motion and neck supple.      Thoracic back: Tenderness present.      Lumbar back: Tenderness present.      Right lower leg: No edema.      Left lower leg: No edema.      Comments: No muscular atrophy or flaccidity.   Lymphadenopathy:      Head:      Right side of head: No submental or submandibular adenopathy.      Left side of head: No submental or submandibular adenopathy.      Cervical: No cervical adenopathy.      Right cervical: No superficial cervical adenopathy.     Left cervical: No superficial cervical adenopathy.   Skin:     General: Skin is warm and dry.      Capillary Refill: Capillary refill takes less than 2 seconds.      Coloration: Skin is not jaundiced or pale.      Findings: No erythema.      Nails: There is no clubbing.   Neurological:      Mental Status: He is alert and oriented to person, place, and time.      Cranial Nerves: No cranial nerve deficit or facial asymmetry.      Sensory: No sensory deficit.      Motor: No weakness, tremor, atrophy or abnormal muscle tone.      Coordination: Coordination normal.      Gait: Gait abnormal (mildy antalgic).      Deep Tendon Reflexes: Reflexes are normal and symmetric.   Psychiatric:         Attention and Perception: He is attentive.         Mood and Affect: Mood normal.         Speech: Speech normal.         Behavior: Behavior normal. Behavior is cooperative.         Thought Content: Thought content normal.         Judgment: Judgment normal.         Assessment/Plan      Visual Acuity Screening    Right eye Left eye Both eyes   Without correction:      With correction: 20/30 20/30 20/25       Functional & Cognitive Status 2/17/2022   Do you have difficulty preparing food and eating? No   Do you have difficulty bathing yourself, getting dressed or grooming yourself? No   Do you have difficulty using the toilet? No   Do you have difficulty moving around from place to place? No   Do you have trouble with steps or getting out  of a bed or a chair? No   Current Diet Unhealthy Diet   Dental Exam Up to date   Eye Exam Up to date   Exercise (times per week) 6 times per week   Current Exercises Include Walking;Treadmill;Weightlifting   Do you need help using the phone?  No   Are you deaf or do you have serious difficulty hearing?  No   Do you need help with transportation? No   Do you need help shopping? No   Do you need help preparing meals?  No   Do you need help with housework?  No   Do you need help with laundry? No   Do you need help taking your medications? No   Do you need help managing money? No   Do you ever drive or ride in a car without wearing a seat belt? No   Have you felt unusual stress, anger or loneliness in the last month? No   Who do you live with? Alone   If you need help, do you have trouble finding someone available to you? No   Have you been bothered in the last four weeks by sexual problems? No   Do you have difficulty concentrating, remembering or making decisions? No       PHQ-2 Depression Screening  Little interest or pleasure in doing things? 0   Feeling down, depressed, or hopeless? 0   PHQ-2 Total Score 0           Diagnoses and all orders for this visit:    1. Visit for annual health examination (Primary)    2. Nasal sore  Comments:  Bactroban ointment to area.  Report any nonhealing area or signs or symptoms of infection  Orders:  -     mupirocin (BACTROBAN) 2 % ointment; Apply  topically to the appropriate area as directed 3 (Three) Times a Day.  Dispense: 30 g; Refill: 2    3. Varicose veins of both lower extremities with pain  Comments:  Referral to vascular.  Orders:  -     Ambulatory Referral to Vascular Surgery    4. Gastroesophageal reflux disease without esophagitis  Comments:  Continue meds including Pepcid and Dexilant.  Continue under the care of GI  Orders:  -     famotidine (Pepcid) 40 MG tablet; Take 1 tablet by mouth 2 (Two) Times a Day.  Dispense: 60 tablet; Refill: 5    5. Chronic frontal  sinusitis  Comments:  We will see if patient is able to resume immunotherapy  Continue montelukast 10 mg nasal sprays, and Claritin 10 mg  Orders:  -     azithromycin (Zithromax Z-Clark) 250 MG tablet; Take 2 tablets by mouth on day 1, then 1 tablet daily on days 2-5  Dispense: 6 tablet; Refill: 0    6. Essential hypertension  Comments:  Continue under the care of cardiology.  Continue lisinopril 30 mg  Orders:  -     aspirin (aspirin) 81 MG EC tablet; Take 1 tablet by mouth Daily.  Dispense: 30 tablet; Refill: 5    7. Psychophysiological insomnia  Comments:  Trial of Saphris 2.5 mg.  Patient to decrease his Remeron back to not more than 1.5 tablets.  Continue under the care of [facility]  Overview:  With depression and anxiety      Orders:  -     Asenapine Maleate 2.5 MG sublingual tablet; Place 1 tablet under the tongue Every Night.  Dispense: 30 tablet; Refill: 0    8. Vitamin D deficiency    9. Mild persistent asthma without complication      Patient's Body mass index is 36.27 kg/m². indicating that he is morbidly obese (BMI > 40 or > 35 with obesity - related health condition). Obesity-related health conditions include the following: hypertension, coronary heart disease, dyslipidemias, GERD and osteoarthritis. Obesity is improving with lifestyle modifications. BMI is is above average. We discussed portion control and increasing exercise..     Jaquan Mckay  reports that he quit smoking about 8 months ago. His smoking use included cigars and cigarettes. He started smoking about 11 years ago. He has a 25.50 pack-year smoking history. He has never used smokeless tobacco..     Understands disease processes and need for medications.  Understands reasons for urgent and emergent care.  Patient (& family) verbalized agreement for treatment plan.   Emotional support and active listening provided.  Patient provided time to verbalize feelings.  Counseling provided today including importance of good nutrition, exercise as  tolerated, dental health, stress reduction and mental health. Importance of immunizations discussed.   Appropriate screenings based on gender (paps, breast exam, mammogram, PSA, colon screens, etc).     Counseled on safe sex practices and STD prevention.   Counseling regarding gun and water safety, domestic violence, and seatbelt use.      Will plan to follow up with asthma and allergy center to see if patient is allowed to resume care.     Referral as above to speciality.    RTC 1 month, sooner if needed.           This document has been electronically signed by:  BALDOMERO Thao, FNP-C    Dragon disclaimer:  Part of this note may be an electronic transcription/translation of spoken language to printed text using the Dragon Dictation System.

## 2022-02-21 ENCOUNTER — TREATMENT (OUTPATIENT)
Dept: PHYSICAL THERAPY | Facility: CLINIC | Age: 52
End: 2022-02-21

## 2022-02-21 DIAGNOSIS — M77.12 LATERAL EPICONDYLITIS OF LEFT ELBOW: ICD-10-CM

## 2022-02-21 DIAGNOSIS — M25.522 LEFT ELBOW PAIN: Primary | ICD-10-CM

## 2022-02-21 PROCEDURE — 97530 THERAPEUTIC ACTIVITIES: CPT | Performed by: PHYSICAL THERAPIST

## 2022-02-21 PROCEDURE — 97110 THERAPEUTIC EXERCISES: CPT | Performed by: PHYSICAL THERAPIST

## 2022-02-21 NOTE — PROGRESS NOTES
Physical Therapy Daily Treatment Note      Patient: Jaquan Mckay   : 1970  Referring practitioner: Ronald S Dubin, MD  Date of Initial Visit: Type: THERAPY  Noted: 12/15/2021  Today's Date: 2022  Patient seen for 14 sessions       Visit Diagnoses:    ICD-10-CM ICD-9-CM   1. Left elbow pain  M25.522 719.42   2. Lateral epicondylitis of left elbow  M77.12 726.32       Subjective:  Patient arrives to therapy w/ reports of 7/10 left elbow pain.  Otherwise pt states of no new complaints/ changes.      Objective   See Exercise, Manual, and Modality Logs for complete treatment.       Assessment/Plan:  Patient tolerated treatment well today w/ no complaints of pain increase noted following, 6/10.  Treatment initiated w/ MH to left elbow f/b therex and therapeutic activities as listed, pulsed US and cryotherapy at conclusion.  Exercise performed w/ continued focus on improved left elbow and upper extremity range of motion, improved UE and  strength, as tolerated.  Pt provided w/ intermittent cues and demonstration while performing exercise for good form, and for max benefit.  No signs of distress or adverse reactions observed during, and/ or following tx.  Pt continues to benefit from therapy services, and will be progressed as tolerated to address goals, restore function, and decrease pain.  Continue w/ PT's POC.         Timed:         Manual Therapy:         mins  08020;     Therapeutic Exercise:    32     mins  37693;     Neuromuscular Jazmin:        mins  53141;    Therapeutic Activity:      10    mins  30474;     Gait Training:           mins  86967;     Ultrasound:    8     mins  21662;    Ionto                                   mins   25756  Self Care                            mins   34533  Canalith Repos         mins 18334      Un-Timed:  Electrical Stimulation:         mins  30213 ( );  Dry Needling          mins self-pay  Traction          mins 79479      Timed Treatment:  50    mins    Total Treatment:    60    mins (CP: x6min, MH: x 4min)    Leighann Brown. Tonny, PTA  [place] License: [id number]

## 2022-02-23 ENCOUNTER — HOSPITAL ENCOUNTER (OUTPATIENT)
Dept: NUCLEAR MEDICINE | Facility: HOSPITAL | Age: 52
Discharge: HOME | End: 2022-02-23

## 2022-02-23 ENCOUNTER — HOSPITAL ENCOUNTER (OUTPATIENT)
Dept: CARDIOLOGY | Facility: HOSPITAL | Age: 52
Discharge: HOME | End: 2022-02-23

## 2022-02-23 DIAGNOSIS — R07.2 PRECORDIAL PAIN: ICD-10-CM

## 2022-02-23 DIAGNOSIS — R06.09 DYSPNEA ON EXERTION: ICD-10-CM

## 2022-02-23 LAB
BH CV NUCLEAR PRIOR STUDY: 3
BH CV REST NUCLEAR ISOTOPE DOSE: 9.7 MCI
BH CV STRESS BP STAGE 1: NORMAL
BH CV STRESS BP STAGE 2: NORMAL
BH CV STRESS COMMENTS STAGE 1: NORMAL
BH CV STRESS COMMENTS STAGE 2: NORMAL
BH CV STRESS DOSE REGADENOSON STAGE 1: 0.4
BH CV STRESS DURATION MIN STAGE 1: 0
BH CV STRESS DURATION MIN STAGE 2: 4
BH CV STRESS DURATION SEC STAGE 1: 10
BH CV STRESS DURATION SEC STAGE 2: 0
BH CV STRESS HR STAGE 1: 105
BH CV STRESS HR STAGE 2: 90
BH CV STRESS NUCLEAR ISOTOPE DOSE: 28.3 MCI
BH CV STRESS PROTOCOL 1: NORMAL
BH CV STRESS RECOVERY BP: NORMAL MMHG
BH CV STRESS RECOVERY HR: 90 BPM
BH CV STRESS STAGE 1: 1
BH CV STRESS STAGE 2: 2
LV EF NUC BP: 57 %
MAXIMAL PREDICTED HEART RATE: 171 BPM
PERCENT MAX PREDICTED HR: 61.4 %
STRESS BASELINE BP: NORMAL MMHG
STRESS BASELINE HR: 71 BPM
STRESS PERCENT HR: 72 %
STRESS POST PEAK BP: NORMAL MMHG
STRESS POST PEAK HR: 105 BPM
STRESS TARGET HR: 145 BPM

## 2022-02-23 PROCEDURE — 93017 CV STRESS TEST TRACING ONLY: CPT

## 2022-02-23 PROCEDURE — A9500 TC99M SESTAMIBI: HCPCS | Performed by: NURSE PRACTITIONER

## 2022-02-23 PROCEDURE — 0 TECHNETIUM SESTAMIBI: Performed by: NURSE PRACTITIONER

## 2022-02-23 PROCEDURE — 93306 TTE W/DOPPLER COMPLETE: CPT

## 2022-02-23 PROCEDURE — 78452 HT MUSCLE IMAGE SPECT MULT: CPT

## 2022-02-23 PROCEDURE — 78452 HT MUSCLE IMAGE SPECT MULT: CPT | Performed by: INTERNAL MEDICINE

## 2022-02-23 PROCEDURE — 93018 CV STRESS TEST I&R ONLY: CPT | Performed by: INTERNAL MEDICINE

## 2022-02-23 PROCEDURE — 93306 TTE W/DOPPLER COMPLETE: CPT | Performed by: INTERNAL MEDICINE

## 2022-02-23 PROCEDURE — 25010000002 REGADENOSON 0.4 MG/5ML SOLUTION: Performed by: INTERNAL MEDICINE

## 2022-02-23 RX ADMIN — TECHNETIUM TC 99M SESTAMIBI 1 DOSE: 1 INJECTION INTRAVENOUS at 10:47

## 2022-02-23 RX ADMIN — REGADENOSON 0.4 MG: 0.08 INJECTION, SOLUTION INTRAVENOUS at 12:07

## 2022-02-23 RX ADMIN — TECHNETIUM TC 99M SESTAMIBI 1 DOSE: 1 INJECTION INTRAVENOUS at 12:07

## 2022-02-24 ENCOUNTER — TREATMENT (OUTPATIENT)
Dept: PHYSICAL THERAPY | Facility: CLINIC | Age: 52
End: 2022-02-24

## 2022-02-24 DIAGNOSIS — M25.522 LEFT ELBOW PAIN: Primary | ICD-10-CM

## 2022-02-24 DIAGNOSIS — M77.12 LATERAL EPICONDYLITIS OF LEFT ELBOW: ICD-10-CM

## 2022-02-24 PROCEDURE — 97110 THERAPEUTIC EXERCISES: CPT | Performed by: PHYSICAL THERAPIST

## 2022-02-24 PROCEDURE — 97140 MANUAL THERAPY 1/> REGIONS: CPT | Performed by: PHYSICAL THERAPIST

## 2022-02-24 NOTE — PROGRESS NOTES
Physical Therapy Daily Treatment Note      Patient: Jaquan Mckay   : 1970  Referring practitioner: Ronald S Dubin, MD  Date of Initial Visit: Type: THERAPY  Noted: 12/15/2021  Today's Date: 2022  Patient seen for 15 sessions       Visit Diagnoses:    ICD-10-CM ICD-9-CM   1. Left elbow pain  M25.522 719.42   2. Lateral epicondylitis of left elbow  M77.12 726.32       Subjective   Patient reports that he is having 8/10 pain in his left elbow. Patient states that he is having increased pain in on the inside of his left elbow.     Objective   See Exercise, Manual, and Modality Logs for complete treatment.       Assessment/Plan  Secondary to patient's reports of increased pain, STM performed to the left medial and lateral epicondyle. Tightness noted during STM. Patient tolerated treatment session well with rest breaks taken as needed by the patient. Patient requested to continue to use the free weights he used last session. Educated patient to perform therex per his tolerance, patient verbalized understanding. No adverse reactions with modalities or treatment session. Decrease in pain noted following treatment session, 6/10 pain. Continue per PT's POC, progress exercises per the patient's tolerance.     Timed:         Manual Therapy:    11     mins  70021;     Therapeutic Exercise:     25    mins  10937;     Neuromuscular Jazmin:        mins  05473;    Therapeutic Activity:     10     mins  93033;     Gait Training:           mins  12432;     Ultrasound:          mins  54670;    Ionto                                   mins   30092  Self Care                            mins   15275  Canalith Repos         mins 03123      Un-Timed:  Electrical Stimulation:         mins  15743 ( );  Dry Needling          mins self-pay  Traction          mins 74339      Timed Treatment:   46   mins   Total Treatment:     46   mins    Radha Martin PTA  [place] License: [id number]

## 2022-02-25 LAB
BH CV ECHO MEAS - AO ROOT AREA (BSA CORRECTED): 1.4
BH CV ECHO MEAS - AO ROOT AREA: 7.1 CM^2
BH CV ECHO MEAS - AO ROOT DIAM: 3 CM
BH CV ECHO MEAS - BSA(HAYCOCK): 2.3 M^2
BH CV ECHO MEAS - BSA: 2.1 M^2
BH CV ECHO MEAS - BZI_BMI: 36.2 KILOGRAMS/M^2
BH CV ECHO MEAS - BZI_METRIC_HEIGHT: 170.2 CM
BH CV ECHO MEAS - BZI_METRIC_WEIGHT: 104.8 KG
BH CV ECHO MEAS - EDV(CUBED): 74.1 ML
BH CV ECHO MEAS - EDV(MOD-SP4): 92.1 ML
BH CV ECHO MEAS - EDV(TEICH): 78.6 ML
BH CV ECHO MEAS - EF(CUBED): 63.6 %
BH CV ECHO MEAS - EF(MOD-SP4): 58 %
BH CV ECHO MEAS - EF(TEICH): 55.5 %
BH CV ECHO MEAS - ESV(CUBED): 27 ML
BH CV ECHO MEAS - ESV(MOD-SP4): 38.7 ML
BH CV ECHO MEAS - ESV(TEICH): 35 ML
BH CV ECHO MEAS - FS: 28.6 %
BH CV ECHO MEAS - IVS/LVPW: 1.1
BH CV ECHO MEAS - IVSD: 1.7 CM
BH CV ECHO MEAS - LA DIMENSION: 3.3 CM
BH CV ECHO MEAS - LA/AO: 1.1
BH CV ECHO MEAS - LV DIASTOLIC VOL/BSA (35-75): 42.8 ML/M^2
BH CV ECHO MEAS - LV MASS(C)D: 290 GRAMS
BH CV ECHO MEAS - LV MASS(C)DI: 134.9 GRAMS/M^2
BH CV ECHO MEAS - LV SYSTOLIC VOL/BSA (12-30): 18 ML/M^2
BH CV ECHO MEAS - LVIDD: 4.2 CM
BH CV ECHO MEAS - LVIDS: 3 CM
BH CV ECHO MEAS - LVLD AP4: 8.3 CM
BH CV ECHO MEAS - LVLS AP4: 8.5 CM
BH CV ECHO MEAS - LVOT AREA (M): 3.5 CM^2
BH CV ECHO MEAS - LVOT AREA: 3.5 CM^2
BH CV ECHO MEAS - LVOT DIAM: 2.1 CM
BH CV ECHO MEAS - LVPWD: 1.6 CM
BH CV ECHO MEAS - MV A MAX VEL: 69 CM/SEC
BH CV ECHO MEAS - MV E MAX VEL: 95.5 CM/SEC
BH CV ECHO MEAS - MV E/A: 1.4
BH CV ECHO MEAS - PA ACC TIME: 0.06 SEC
BH CV ECHO MEAS - PA PR(ACCEL): 52 MMHG
BH CV ECHO MEAS - SI(CUBED): 21.9 ML/M^2
BH CV ECHO MEAS - SI(MOD-SP4): 24.8 ML/M^2
BH CV ECHO MEAS - SI(TEICH): 20.3 ML/M^2
BH CV ECHO MEAS - SV(CUBED): 47.1 ML
BH CV ECHO MEAS - SV(MOD-SP4): 53.4 ML
BH CV ECHO MEAS - SV(TEICH): 43.6 ML
MAXIMAL PREDICTED HEART RATE: 171 BPM
STRESS TARGET HR: 145 BPM

## 2022-02-28 ENCOUNTER — TREATMENT (OUTPATIENT)
Dept: PHYSICAL THERAPY | Facility: CLINIC | Age: 52
End: 2022-02-28

## 2022-02-28 DIAGNOSIS — M77.12 LATERAL EPICONDYLITIS OF LEFT ELBOW: ICD-10-CM

## 2022-02-28 DIAGNOSIS — M25.522 LEFT ELBOW PAIN: Primary | ICD-10-CM

## 2022-02-28 PROCEDURE — 97110 THERAPEUTIC EXERCISES: CPT | Performed by: PHYSICAL THERAPIST

## 2022-02-28 PROCEDURE — 97140 MANUAL THERAPY 1/> REGIONS: CPT | Performed by: PHYSICAL THERAPIST

## 2022-02-28 NOTE — PROGRESS NOTES
Physical Therapy Daily Treatment Note      Patient: Jaquan Mckay   : 1970  Referring practitioner: Ronald S Dubin, MD  Date of Initial Visit: Type: THERAPY  Noted: 12/15/2021  Today's Date: 2022  Patient seen for 16 sessions       Visit Diagnoses:    ICD-10-CM ICD-9-CM   1. Left elbow pain  M25.522 719.42   2. Lateral epicondylitis of left elbow  M77.12 726.32       Subjective:  Patient arrives to therapy w/ reports of 7/10 left elbow pain.  Pt states he continues to have increased elbow pain, when trying to carry a gallon of milk.  Pt also reports increased pain w/ reaching across the body to wash his shoulder on the opposite side.       Objective   See Exercise, Manual, and Modality Logs for complete treatment.       Assessment/Plan: Patient completed today's session w/ reports of slight decrease in pain following, 5/10.  Pt received MH to left elbow for pain control, and improved tissue mobility f/b manual rx and therex as listed.  Manual rx performed including soft tissue mobilization to address tightness f/b exercise for improved left elbow range of motion, improved left UE strength, and , as tolerated.  Exercise progressed w/ wrist flexion/ extension w/ weight initiated.  Pt observed w/ good tolerance, and was provided w/ cues and demonstration for max benefit.  Cryotherapy applied at conclusion.  Pt continues to benefit from therapy services, and will be progressed as tolerated to address goals, decrease pain, and restore function.  No signs of distress or adverse reactions observed during, and/ or following tx.  Continue w/ PT's POC.        Timed:         Manual Therapy:    12     mins  72515;     Therapeutic Exercise:    34     mins  21537;     Neuromuscular Jazmin:        mins  95126;    Therapeutic Activity:          mins  27447;     Gait Training:           mins  36379;     Ultrasound:          mins  13098;    Ionto                                   mins   11467  Self Care                             mins   82303  [facility]         mins 54049      Un-Timed:  Electrical Stimulation:         mins  23231 ( );  Dry Needling          mins self-pay  Traction          mins 23362      Timed Treatment:  46    mins   Total Treatment:    58    mins    Leighann Brown. [place], NEIL  [place] License: [id number]

## 2022-03-02 ENCOUNTER — TREATMENT (OUTPATIENT)
Dept: PHYSICAL THERAPY | Facility: CLINIC | Age: 52
End: 2022-03-02

## 2022-03-02 DIAGNOSIS — M25.522 LEFT ELBOW PAIN: Primary | ICD-10-CM

## 2022-03-02 DIAGNOSIS — M77.12 LATERAL EPICONDYLITIS OF LEFT ELBOW: ICD-10-CM

## 2022-03-02 PROCEDURE — 97140 MANUAL THERAPY 1/> REGIONS: CPT | Performed by: PHYSICAL THERAPIST

## 2022-03-02 PROCEDURE — 97110 THERAPEUTIC EXERCISES: CPT | Performed by: PHYSICAL THERAPIST

## 2022-03-02 NOTE — PROGRESS NOTES
Physical Therapy Daily Treatment Note      Patient: Jaquan Mckay   : 1970  Referring practitioner: Ronald S Dubin, MD  Date of Initial Visit: Type: THERAPY  Noted: 12/15/2021  Today's Date: 3/2/2022  Patient seen for 17 sessions       Visit Diagnoses:    ICD-10-CM ICD-9-CM   1. Left elbow pain  M25.522 719.42   2. Lateral epicondylitis of left elbow  M77.12 726.32       Subjective:  Patient reports 6/10 left elbow pain prior to tx.  Pt states he is scheduled to f/u with referring provider at 145 PM today.  Patient states he feels therapy is helping, and he is getting stronger.    Objective   See Exercise, Manual, and Modality Logs for complete treatment.       Assessment/Plan:  Patient demonstrated good effort during today's session, and reported slight decrease in pain following, 4/10.  Pt received MH to left elbow for pain control and improved tissue mobility f/b manual rx and therex as listed.  Manual rx performed including soft tissue mobilization to address tightness, and assist w/ improved ROM; pt observed w/ good tolerance.  Therex progressed w/ resistance w/ bicep curls increased from 4 to 5 pounds, and weight w/ wrist flexion/ extension increased from 2 to 3 pounds.  Pt noted w/ positive response, and was provided w/ cues and demonstration as needed w/ exercise for good form, and max benefit.  Cryotherapy denied at conclusion secondary to time constraint w/ MD appointment following.  No signs of distress or adverse reactions observed during, and/ or following tx.  Pt continues to benefit from therapy services, and will be progressed as tolerated, and per MD recommendations.  Continue w/ PT' POC.      Timed:         Manual Therapy:    14     mins  35585;     Therapeutic Exercise:    30     mins  44325;     Neuromuscular Jazmin:        mins  81658;    Therapeutic Activity:          mins  82624;     Gait Training:           mins  91493;     Ultrasound:          mins  96958;    Ionto                                    mins   65785  Self Care                            mins   77034  Canalith Repos         mins 54904      Un-Timed:  Electrical Stimulation:         mins  84029 ( );  Dry Needling          mins self-pay  Traction          mins 75917      Timed Treatment:  44    mins   Total Treatment:   49     mins (MH: x 5min)    Leighann Brown. NEIL [place]  [place] License: [id number]

## 2022-03-07 ENCOUNTER — TREATMENT (OUTPATIENT)
Dept: PHYSICAL THERAPY | Facility: CLINIC | Age: 52
End: 2022-03-07

## 2022-03-07 DIAGNOSIS — M25.522 LEFT ELBOW PAIN: Primary | ICD-10-CM

## 2022-03-07 DIAGNOSIS — M77.12 LATERAL EPICONDYLITIS OF LEFT ELBOW: ICD-10-CM

## 2022-03-07 PROCEDURE — 97110 THERAPEUTIC EXERCISES: CPT | Performed by: PHYSICAL THERAPIST

## 2022-03-07 PROCEDURE — 97140 MANUAL THERAPY 1/> REGIONS: CPT | Performed by: PHYSICAL THERAPIST

## 2022-03-07 NOTE — PROGRESS NOTES
Physical Therapy Daily Treatment Note      Patient: Jaquan Mckay   : 1970  Referring practitioner: Ronald S Dubin, MD  Date of Initial Visit: Type: THERAPY  Noted: 12/15/2021  Today's Date: 3/7/2022  Patient seen for 18 sessions       Visit Diagnoses:    ICD-10-CM ICD-9-CM   1. Left elbow pain  M25.522 719.42   2. Lateral epicondylitis of left elbow  M77.12 726.32       Subjective:  Patient arrives to therapy w/ reports of /10 left elbow pain.  Pt states he continues to have pain along the inside and outside of the elbow.  Pt reports he had f/u appointment with referring provider last week, and received recommendation to continue w/ therapy.   Patient states he is scheduled to have an MRI of his left elbow in [place] on Friday.     Objective   See Exercise, Manual, and Modality Logs for complete treatment.       Assessment/Plan:  Patient demonstrated good effort during today's session, and reported slight decrease in pain following, 4/10.  Pt received MH to left elbow f/b manual rx and therex as listed.  Cryotherapy applied following.  Manual rx performed including soft tissue mobilization to left elbow to address tightness, reduce edema, and assist w/ improved mobility.  Resistance with wrist flex/ ext reduced from 3 to 2 pounds, as well as with wrist radial ulnar deviation due to discomfort.  Symptoms improved with reduced weight.  Cryotherapy applied at conclusion.  No adverse reactions observed during, and/ or following tx.  Pt continues to benefit from therapy services, and will be progressed as tolerated to address goals, decrease pain, and improve function.  Continue w/ PT's POC.       Timed:         Manual Therapy:    14     mins  52610;     Therapeutic Exercise:    35     mins  21818;     Neuromuscular Jazmin:        mins  43917;    Therapeutic Activity:          mins  90130;     Gait Training:           mins  87941;     Ultrasound:          mins  17241;    Ionto                                    mins   98323  Self Care                            mins   69015  Canalith Repos         mins 36347      Un-Timed:  Electrical Stimulation:         mins  88928 ( );  Dry Needling          mins self-pay  Traction          mins 93726      Timed Treatment:   49   mins   Total Treatment:     58   mins    Leighann Brown. NEIL [place]  [place] License: [id number]

## 2022-03-10 ENCOUNTER — TREATMENT (OUTPATIENT)
Dept: PHYSICAL THERAPY | Facility: CLINIC | Age: 52
End: 2022-03-10

## 2022-03-10 DIAGNOSIS — M77.12 LATERAL EPICONDYLITIS OF LEFT ELBOW: ICD-10-CM

## 2022-03-10 DIAGNOSIS — M25.522 LEFT ELBOW PAIN: Primary | ICD-10-CM

## 2022-03-10 PROCEDURE — 97110 THERAPEUTIC EXERCISES: CPT | Performed by: PHYSICAL THERAPIST

## 2022-03-10 PROCEDURE — 97140 MANUAL THERAPY 1/> REGIONS: CPT | Performed by: PHYSICAL THERAPIST

## 2022-03-10 NOTE — PROGRESS NOTES
Physical Therapy Re Certification Of Plan of Care  Patient: Jaquan Mckay   : 1970  Diagnosis/ICD-10 Code:  Left elbow pain [M25.522]  Referring practitioner: Ronald S Dubin, MD  Date of Initial Visit: Type: THERAPY  Noted: 12/15/2021  Today's Date: 3/10/2022  Patient seen for 19 sessions         Visit Diagnoses:    ICD-10-CM ICD-9-CM   1. Left elbow pain  M25.522 719.42   2. Lateral epicondylitis of left elbow  M77.12 726.32       Subjective Questionnaire: QuickDASH: 72.73%  impaired  Clinical Progress: improved  Home Program Compliance: Yes      Subjective Evaluation    History of Present Illness    Subjective comment: Patient reports that he feels like his elbow is moving better, but it continues to hurt.  He mentions that he has been experiencing numbness in his left thumb and 1st digit, but notes that MD is aware.  Patient has an MRI scheduled for tomorrow.Pain  Current pain ratin  At best pain ratin  At worst pain ratin             Objective          Active Range of Motion     Left Elbow   Flexion: 135 degrees   Extension: 0 degrees   Forearm supination: 75 degrees   Forearm pronation: WFL    Strength/Myotome Testing     Left Elbow   Flexion: 4+  Extension: 4+  Forearm supination: 4-  Forearm pronation: 4-    Left Wrist/Hand      (2nd hand position)     Trial 1: 72 lbs    Trial 2: 70 lbs    Trial 3: 98 lbs    Average: 80 lbs          Assessment & Plan     Assessment  Impairments: abnormal or restricted ROM, activity intolerance, impaired physical strength, lacks appropriate home exercise program and pain with function  Functional Limitations: carrying objects, sleeping, pulling, pushing, uncomfortable because of pain, reaching behind back, reaching overhead and unable to perform repetitive tasks  Assessment details: Patient has been attending physical therapy for left elbow pain; he has attended therapy for a total of 19 sessions.  Patient has shown improvements in left elbow ROM,  elbow strength, and  strength.  Left  strength has improved by an average of 10# since last progress note.  Patient continues to note elevated pain levels, noting 4/10 pain at best and 9/10 pain at worst.  He currently reports a 72.73% functional mobility impairment, based on his response to the QuickDash.  Patient will continue to benefit from skilled PT to achieve maximum level of function.  Prognosis: good    Goals  Plan Goals: STGs: 4 weeks  1. Patient will be independent/compliant with HEP.  Met  2. Patient will report pain no greater than 6/10 when performing self-care activities.  Ongoing  3. Left elbow AROM will improve to at least 10-90 to allow for greater ease with daily tasks.  Met    LTGs: 8 weeks  1. Patient will improve left  strength to at least 80# to allow for greater ease with opening items.  Met-80#  2. Left elbow AROM will improve to WFL to allow for greater ease with daily tasks.  Ongoing, progressing  3. Patient will report left elbow pain no greater than 4/10 with moderate lifting.  Ongoing    Plan  Therapy options: will be seen for skilled therapy services  Planned modality interventions: cryotherapy, iontophoresis, electrical stimulation/Russian stimulation, TENS, thermotherapy (hydrocollator packs) and ultrasound  Planned therapy interventions: manual therapy, ADL retraining, neuromuscular re-education, body mechanics training, postural training, soft tissue mobilization, flexibility, functional ROM exercises, strengthening, stretching, home exercise program, therapeutic activities, IADL retraining, transfer training and joint mobilization  Frequency: 2x week  Duration in weeks: 4  Treatment plan discussed with: patient  Plan details: Re-evaluation   87904    Therapeutic exercise  24342  Therapeutic activity    36007  Neuromuscular re-education   49469  Manual therapy   85422    Unattended e-stim (Medicaid/Medicare)     Moist heat/cryotherapy 28429   Ultrasound    15801  Iontophoresis   12783               Recommendations: Continue as planned  Timeframe: 1 month  Prognosis to achieve goals: good      Timed:         Manual Therapy:    10     mins  51953;     Therapeutic Exercise:    31     mins  20050;     Neuromuscular Jazmin:        mins  29305;    Therapeutic Activity:          mins  08536;     Gait Training:           mins  99376;     Ultrasound:          mins  82681;    Ionto                                   mins   60177  Self Care                            mins   74584  Canalith Repos         mins 39946      Un-Timed:  Electrical Stimulation:         mins  76100 ( );  Dry Needling          mins self-pay  Traction          mins 66694  Re-Eval                               mins  64391  Moist heat-5 min  Ice-3 min    Timed Treatment:    41  mins   Total Treatment:     49   mins          PT: Hanna Burks PT, DPT  [place] License:  [id number]    Assisted by: Radha Martin PTA         Electronically signed by Hanna Burks PT, 03/10/22, 2:38 PM EST    Certification Period: 3/10/2022 thru 6/7/2022  I certify that the therapy services are furnished while this patient is under my care.  The services outlined above are required by this patient, and will be reviewed every 90 days.         Physician Signature:__________________________________________________    PHYSICIAN: Dubin, Ronald S, MD  NPI: [id number]                                      DATE:  :     Please sign and return via fax to [email or web address] . Thank you, [facility]

## 2022-03-12 ENCOUNTER — APPOINTMENT (OUTPATIENT)
Dept: GENERAL RADIOLOGY | Facility: HOSPITAL | Age: 52
End: 2022-03-12

## 2022-03-12 ENCOUNTER — HOSPITAL ENCOUNTER (EMERGENCY)
Facility: HOSPITAL | Age: 52
Discharge: HOME | End: 2022-03-12
Attending: EMERGENCY MEDICINE | Admitting: FAMILY MEDICINE

## 2022-03-12 VITALS
HEIGHT: 67 IN | TEMPERATURE: 97.9 F | BODY MASS INDEX: 35.79 KG/M2 | SYSTOLIC BLOOD PRESSURE: 131 MMHG | OXYGEN SATURATION: 99 % | DIASTOLIC BLOOD PRESSURE: 78 MMHG | WEIGHT: 228 LBS | HEART RATE: 102 BPM | RESPIRATION RATE: 18 BRPM

## 2022-03-12 DIAGNOSIS — S93.401A SPRAIN OF RIGHT ANKLE, UNSPECIFIED LIGAMENT, INITIAL ENCOUNTER: Primary | ICD-10-CM

## 2022-03-12 PROCEDURE — 73610 X-RAY EXAM OF ANKLE: CPT

## 2022-03-12 PROCEDURE — 73630 X-RAY EXAM OF FOOT: CPT

## 2022-03-12 PROCEDURE — 99283 EMERGENCY DEPT VISIT LOW MDM: CPT

## 2022-03-12 PROCEDURE — 73590 X-RAY EXAM OF LOWER LEG: CPT

## 2022-03-12 RX ORDER — HYDROCODONE BITARTRATE AND ACETAMINOPHEN 5; 325 MG/1; MG/1
1 TABLET ORAL EVERY 8 HOURS PRN
Qty: 5 TABLET | Refills: 0 | Status: SHIPPED | OUTPATIENT
Start: 2022-03-12 | End: 2022-03-23 | Stop reason: SDUPTHER

## 2022-03-13 NOTE — ED PROVIDER NOTES
"Subjective   51-year-old male presents the emergency room with complaints of right ankle pain.  Patient reports that he was playing basketball when he went to get the ball he says he came down on his right foot.  He states he rolled his right ankle out.  He states he heard a loud pop.  He states since that time has had ongoing right ankle pain.  Denies radiation of pain he states pain stays in his ankle.  He is not taking medication relief of symptoms.  He states he is unable to bear weight since injury occurred due to pain with bearing weight.  He denies any other injury.  Denies hitting his head.  He denies chest pain shortness of breath.      Ankle Injury  Location:  Right ankle injury  Severity:  Moderate  Timing:  Constant  Chronicity:  New  Associated symptoms: no abdominal pain, no chest pain, no congestion, no fatigue, no shortness of breath, no sore throat, no vomiting and no wheezing        Review of Systems   Constitutional: Negative for fatigue.   HENT: Negative for congestion and sore throat.    Respiratory: Negative for shortness of breath and wheezing.    Cardiovascular: Negative for chest pain.   Gastrointestinal: Negative for abdominal pain and vomiting.   All other systems reviewed and are negative.      Past Medical History:   Diagnosis Date   • Allergic    • Anxiety    • Arthritis    • Asthma    • Body piercing     REPORTS CYLICONE IN EARS   • Clotting disorder (HCC) 2004    had a knee surgery   • Coronary artery disease    • Depression    • DVT (deep venous thrombosis) (Formerly [facility])     RIGHT RIGHT KNEE AFTER SURGERY YEARS AGO IN 2001 OR 2004   • Elevated cholesterol    • Gastric ulcer    • GERD (gastroesophageal reflux disease)    • H/O migraine    • Headache    • Heart attack (Former[facility])     REPORTS \"LIGHT HEART ATTACK A LONG TIME AGO\"  \"EARLY 90'S\"   • History of seizures     REPORTS LAST EPISODE WAS AROUND 1995.   • Hostility    • Hyperlipidemia    • Hypertension    • Knee pain, acute     Left   • Low " "back pain    • Lyme disease    • Migraine    • MRSA (methicillin resistant Staphylococcus aureus)     REPORTS LAST TESTED + 2004. WAS TREATED HE REPORTS.  RIGHT ARM, RIGHT KNEE.   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    • Obesity    • Poor historian    • Carl [place] spotted fever    • Seizures (HCC)    • Sleep apnea    • Tattoo    • Wears glasses        Allergies   Allergen Reactions   • Ciprofloxacin Anaphylaxis and Hives   • Miralax [Polyethylene Glycol] Itching and Rash   • Mobic [Meloxicam] Other (See Comments)     Pt states, \"It make my feet and hands go numb and I can't hardly walk.\"    • Paxil [Paroxetine Hcl] Shortness Of Breath     Chest pain    • Peanut-Containing Drug Products Anaphylaxis   • Penicillins Anaphylaxis   • Pristiq [Desvenlafaxine Succinate Er] Dizziness   • Sulfa Antibiotics Anaphylaxis, Itching and Rash   • Doxycycline Hives   • Fish-Derived Products Hives   • Isosorbide Nitrate Rash     Rash, hives, had to use inhaler.    • Movantik [Naloxegol] Rash   • Seroquel [Quetiapine] Hives and Rash   • Buspar [Buspirone] Rash   • Clarithromycin Rash   • Clindamycin/Lincomycin Rash   • Codeine Rash   • Contrast Dye Itching and Rash   • Diltiazem Rash   • Flomax [Tamsulosin] Hives   • Gabapentin Rash   • Keflex [Cephalexin] Rash   • Linzess [Linaclotide] Rash   • Metoprolol Rash   • Prednisone Rash and Other (See Comments)     Face, feet, and legs go completely numb per patient   • Robitussin Cough+ Chest Max St [Dextromethorphan-Guaifenesin] Itching   • Shrimp (Diagnostic) Rash   • Spironolactone Rash   • Viibryd [Vilazodone Hcl] Itching and Rash   • Zoloft [Sertraline Hcl] Hives and Itching       Past Surgical History:   Procedure Laterality Date   • ABDOMINAL SURGERY     • BACK SURGERY     • BRAIN SURGERY  1986    Tumor removal    • CARDIAC CATHETERIZATION N/A 9/28/2018    Procedure: Left Heart Cath;  Surgeon: Leandro Daily MD;  [facility]: [facility] CATH INVASIVE LOCATION;  Service: Cardiology   • CHOLECYSTECTOMY "     • COLONOSCOPY     • COLONOSCOPY N/A 8/2/2021    Procedure: COLONOSCOPY FOR SCREENING;  Surgeon: Irving Azar MD;  Location: [facility] OR;  Service: Gastroenterology;  Laterality: N/A;   • CYST REMOVAL      pilonidal cyst   • ELBOW EPICONDYLECTOMY Right 7/23/2020    Procedure: LATERAL EPICONDYLAR RELEASE;  Surgeon: Jose Ruiz MD;  Location: [facility] OR;  Service: Orthopedics;  Laterality: Right;   • ENDOSCOPY     • ENDOSCOPY N/A 8/2/2021    Procedure: ESOPHAGOGASTRODUODENOSCOPY WITH BIOPSY;  Surgeon: Irving Azar MD;  Location: [facility] OR;  Service: Gastroenterology;  Laterality: N/A;  esophageal dilatation to 20mm   • FRACTURE SURGERY Right     elbow   • KNEE ARTHROSCOPY Left 10/20/2017    Procedure: Diagnostic arthroscopy left knee with chondroplasty;  Surgeon: Marco Aguirre MD;  Location: [facility] OR;  Service:    • KNEE ARTHROSCOPY Left 1/11/2021    Procedure: KNEE DIAGNOSTIC ARTHROSCOPY WITH  CHONDROPLASTY patella, femoral and medial;  Surgeon: Raul Eagle MD;  Location: [facility] OR;  Service: Orthopedics;  Laterality: Left;   • KNEE SURGERY Right    • MOUTH SURGERY      FULL MOUTH EXTRACTION   • OTHER SURGICAL HISTORY      REPORTS 7 TICKS REMOVED FROM RIGHT ARM IN 2001 OR 2002   • TENNIS ELBOW RELEASE Right 7/23/2020    Procedure: RIGHT TENNIS ELBOW RELEASE;  Surgeon: Jose Ruiz MD;  Location: [facility] OR;  Service: Orthopedics;  Laterality: Right;   • TUMOR EXCISION      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       Family History   Problem Relation Age of Onset   • Diabetes Mother    • Hypertension Mother    • Stroke Mother    • Diabetes Father    • Skin cancer Father    • Hypertension Father    • Heart attack Father    • Diabetes Brother    • Hypertension Brother    • Heart disease Maternal Aunt    • Heart disease Maternal Uncle    • Heart disease Paternal Aunt    • Heart disease Paternal Uncle    • Heart disease Maternal Grandmother    • Heart  disease Maternal Grandfather    • Heart disease Paternal Grandmother    • Heart disease Paternal Grandfather        Social History     Socioeconomic History   • Marital status:      Spouse name: Becca   • Number of children: 2   • Years of education: 12   Tobacco Use   • Smoking status: Former Smoker     Packs/day: 1.50     Years: 17.00     Pack years: 25.50     Types: Cigars, Cigarettes     Start date: 2010     Quit date: 2021     Years since quittin.7   • Smokeless tobacco: Never Used   Vaping Use   • Vaping Use: Never used   Substance and Sexual Activity   • Alcohol use: No   • Drug use: No   • Sexual activity: Defer     Partners: Female     Birth control/protection: None           Objective   Physical Exam  Vitals and nursing note reviewed.   Constitutional:       Appearance: He is obese. He is not ill-appearing.   HENT:      Head: Normocephalic and atraumatic.   Eyes:      Pupils: Pupils are equal, round, and reactive to light.   Cardiovascular:      Rate and Rhythm: Normal rate and regular rhythm.      Comments: 2+ radial pulses 2+ dorsalis pedis pulses no extrasystoles  Pulmonary:      Effort: Pulmonary effort is normal.      Breath sounds: Normal breath sounds.      Comments: No rhonchi's rales or wheezes no accessory muscle use.  Abdominal:      General: Bowel sounds are normal.      Palpations: Abdomen is soft.      Tenderness: There is no abdominal tenderness. There is no guarding.   Musculoskeletal:      Cervical back: Neck supple.      Comments: Right medial lateral ankle tenderness palpation.  Patient able to flex extend feet.  No joint deformity appreciated.  No Achilles deficit.  Patient able to flex extend knee   Skin:     General: Skin is warm and dry.      Capillary Refill: Capillary refill takes less than 2 seconds.   Neurological:      General: No focal deficit present.      Mental Status: He is alert and oriented to person, place, and time.      Cranial Nerves: No  cranial nerve deficit.      Sensory: No sensory deficit.   Psychiatric:         Mood and Affect: Mood normal.         Procedures           ED Course  ED Course as of 03/13/22 0109   Sat Mar 12, 2022   1954 Patient plain films appear unremarkable.  Have discussed limitations of plain films.  Patient likely with ankle sprain.  Have discussed rice therapy.  Will place patient in splint as well as give crutches.  Discussed to follow-up with orthopedics.  Patient neurovascular intact.  Discussed warning signs and symptoms that warrant return to emergency room patient verbalized understanding [BB]   1957 CHAVEZ [id number] [BB]      ED Course User Index  [BB] Agustín Matamoros MD                                                 [facility]    Final diagnoses:   Sprain of right ankle, unspecified ligament, initial encounter       ED Disposition  ED Disposition     ED Disposition   Discharge    Condition   Stable    Comment   --             Raul Eagle MD  [address] [place] [address] [address] [place] [address] [phone]    In 2 days           Medication List      New Prescriptions    HYDROcodone-acetaminophen 5-325 MG per tablet  Commonly known as: NORCO  Take 1 tablet by mouth Every 8 (Eight) Hours As Needed for Moderate Pain .           Where to Get Your Medications      These medications were sent to [facility] [place] - [address] - [phone]  - [phone]   [address] [address]    Phone: [phone]   · HYDROcodone-acetaminophen 5-325 MG per tablet          Agustín Matamoros MD  03/13/22 0109

## 2022-03-14 ENCOUNTER — TREATMENT (OUTPATIENT)
Dept: PHYSICAL THERAPY | Facility: CLINIC | Age: 52
End: 2022-03-14

## 2022-03-14 DIAGNOSIS — M77.12 LATERAL EPICONDYLITIS OF LEFT ELBOW: ICD-10-CM

## 2022-03-14 DIAGNOSIS — M25.522 LEFT ELBOW PAIN: Primary | ICD-10-CM

## 2022-03-14 PROCEDURE — 97140 MANUAL THERAPY 1/> REGIONS: CPT | Performed by: PHYSICAL THERAPIST

## 2022-03-14 PROCEDURE — 97110 THERAPEUTIC EXERCISES: CPT | Performed by: PHYSICAL THERAPIST

## 2022-03-14 NOTE — PROGRESS NOTES
Physical Therapy Daily Treatment Note      Patient: Jaquan Mckay   : 1970  Referring practitioner: Ronald S Dubin, MD  Date of Initial Visit: Type: THERAPY  Noted: 12/15/2021  Today's Date: 3/14/2022  Patient seen for 20 sessions       Visit Diagnoses:    ICD-10-CM ICD-9-CM   1. Left elbow pain  M25.522 719.42   2. Lateral epicondylitis of left elbow  M77.12 726.32       Subjective Evaluation    History of Present Illness    Subjective comment: Pt reprots having 6/10 pain today.  Pt cont to have pain along the medial left condyle.         Objective   See Exercise, Manual, and Modality Logs for complete treatment.       Assessment & Plan     Assessment    Assessment details: Tx today consisted of mh to left elbow followed by manual stretching of wrist and elbow, progressed with elbow stability training with increased reps and resistance and ended with stm to elbow for decreased edema and ice.  Pt responded well to added single arm tricep curls with weight tower and increased reps for exercises.  Will follow for           Timed:         Manual Therapy:    14     mins  76734;     Therapeutic Exercise:    30     mins  46433;     Neuromuscular Jazmin:        mins  20700;    Therapeutic Activity:          mins  63852;     Gait Training:           mins  91695;     Ultrasound:          mins  65523;    Ionto                                   mins   42394  Self Care                            mins   89290  Canalith Repos         mins 31979      Un-Timed:  Electrical Stimulation:         mins  65829 ( );  Dry Needling          mins self-pay  Traction          mins 42803      Timed Treatment:   44   mins   Total Treatment:     55   Mins(5min mh and 6 min ice)    Win Rodriguez PT  [place] License: [id number]      Electronically signed by Win Rodriguez PT, 22, 1:31 PM EDT

## 2022-03-17 ENCOUNTER — OFFICE VISIT (OUTPATIENT)
Dept: CARDIOLOGY | Facility: CLINIC | Age: 52
End: 2022-03-17

## 2022-03-17 ENCOUNTER — TREATMENT (OUTPATIENT)
Dept: PHYSICAL THERAPY | Facility: CLINIC | Age: 52
End: 2022-03-17

## 2022-03-17 VITALS
SYSTOLIC BLOOD PRESSURE: 132 MMHG | DIASTOLIC BLOOD PRESSURE: 73 MMHG | HEART RATE: 74 BPM | WEIGHT: 240 LBS | HEIGHT: 67 IN | TEMPERATURE: 97 F | BODY MASS INDEX: 37.67 KG/M2 | OXYGEN SATURATION: 99 %

## 2022-03-17 DIAGNOSIS — M77.12 LATERAL EPICONDYLITIS OF LEFT ELBOW: ICD-10-CM

## 2022-03-17 DIAGNOSIS — E78.5 DYSLIPIDEMIA: ICD-10-CM

## 2022-03-17 DIAGNOSIS — I10 ESSENTIAL HYPERTENSION: ICD-10-CM

## 2022-03-17 DIAGNOSIS — R07.2 PRECORDIAL PAIN: Primary | ICD-10-CM

## 2022-03-17 DIAGNOSIS — M25.522 LEFT ELBOW PAIN: Primary | ICD-10-CM

## 2022-03-17 PROCEDURE — 97140 MANUAL THERAPY 1/> REGIONS: CPT | Performed by: PHYSICAL THERAPIST

## 2022-03-17 PROCEDURE — 99213 OFFICE O/P EST LOW 20 MIN: CPT | Performed by: NURSE PRACTITIONER

## 2022-03-17 PROCEDURE — 97035 APP MDLTY 1+ULTRASOUND EA 15: CPT | Performed by: PHYSICAL THERAPIST

## 2022-03-17 PROCEDURE — 97110 THERAPEUTIC EXERCISES: CPT | Performed by: PHYSICAL THERAPIST

## 2022-03-17 NOTE — PROGRESS NOTES
Tiera Valenzuela APRN  Jaquan PRADO Nely  1970 03/17/2022    Patient Active Problem List   Diagnosis   • Gastroesophageal reflux disease without esophagitis   • Essential hypertension   • Seizures (HCC)   • Hyperlipidemia   • Anxiety   • Varicocele   • Varicocele present on ultrasound of scrotum   • Chronic bilateral low back pain with left-sided sciatica   • Seasonal allergic rhinitis due to pollen   • Mild persistent asthma without complication   • Precordial pain   • Dysuria   • Congenital coronary artery anomaly   • BMI 35.0-35.9,adult   • Chondromalacia, knee   • Achilles tendinitis of both lower extremities   • Muscle spasm of both lower legs   • Personal history of allergy to shellfish   • Sensation of cold in lower extremity   • Varicose vein of leg   • Heart palpitations   • Shortness of breath   • Generalized anxiety disorder   • Chronic elbow pain, right   • Chest pain   • Unstable angina (HCC)   • ASCVD (arteriosclerotic cardiovascular disease)   • Elevated prolactin level   • Other constipation   • Class 1 obesity due to excess calories with serious comorbidity and body mass index (BMI) of 33.0 to 33.9 in adult   • Bacteremia   • Therapeutic opioid-induced constipation (OIC)   • Rectal bleeding   • Bloating   • Generalized abdominal pain   • History of colon polyps   • Lateral epicondylitis of right elbow   • Psychophysiological insomnia   • Chronic rhinitis   • Vitamin D deficiency   • Renal calculus   • Chronic idiopathic constipation   • Diarrhea   • Bilateral flank pain   • BPH without obstruction/lower urinary tract symptoms   • Incomplete bladder emptying       Dear Tiera Valenzuela APRN:    Subjective     Chief Complaint   Patient presents with   • Med Management     Verbal.    • Results     Stress and echo.    • Chest Pain   • Edema     Hands and feet.    • Dizziness   • Syncope     Almost passed out when he was having chest pain.    • Palpitations           History of Present  "Illness:    Jaquan Mckay is a 51 y.o. male with a past medical history of hypertension and dyslipidemia.  He presents today for routine cardiology follow-up.  He has been having some substernal chest pains radiating into the back.  He was evaluated further with an echocardiogram which revealed normal LV function and no significant valvular abnormalities.  A Lexiscan stress test was negative for evidence of ischemia.          Allergies   Allergen Reactions   • Ciprofloxacin Anaphylaxis and Hives   • Miralax [Polyethylene Glycol] Itching and Rash   • Mobic [Meloxicam] Other (See Comments)     Pt states, \"It make my feet and hands go numb and I can't hardly walk.\"    • Paxil [Paroxetine Hcl] Shortness Of Breath     Chest pain    • Peanut-Containing Drug Products Anaphylaxis   • Penicillins Anaphylaxis   • Pristiq [Desvenlafaxine Succinate Er] Dizziness   • Sulfa Antibiotics Anaphylaxis, Itching and Rash   • Doxycycline Hives   • Fish-Derived Products Hives   • Isosorbide Nitrate Rash     Rash, hives, had to use inhaler.    • Movantik [Naloxegol] Rash   • Seroquel [Quetiapine] Hives and Rash   • Buspar [Buspirone] Rash   • Clarithromycin Rash   • Clindamycin/Lincomycin Rash   • Codeine Rash   • Contrast Dye Itching and Rash   • Diltiazem Rash   • Flomax [Tamsulosin] Hives   • Gabapentin Rash   • Keflex [Cephalexin] Rash   • Linzess [Linaclotide] Rash   • Metoprolol Rash   • Prednisone Rash and Other (See Comments)     Face, feet, and legs go completely numb per patient   • Robitussin Cough+ Chest Max St [Dextromethorphan-Guaifenesin] Itching   • Shrimp (Diagnostic) Rash   • Spironolactone Rash   • Viibryd [Vilazodone Hcl] Itching and Rash   • Zoloft [Sertraline Hcl] Hives and Itching   :      Current Outpatient Medications:   •  albuterol sulfate  (90 Base) MCG/ACT inhaler, , Disp: , Rfl:   •  Asenapine Maleate 2.5 MG sublingual tablet, Place 1 tablet under the tongue Every Night., Disp: 30 tablet, Rfl: 0  •  " aspirin (aspirin) 81 MG EC tablet, Take 1 tablet by mouth Daily., Disp: 30 tablet, Rfl: 5  •  azelastine (ASTELIN) 0.1 % nasal spray, , Disp: , Rfl:   •  azithromycin (Zithromax Z-Clark) 250 MG tablet, Take 2 tablets by mouth on day 1, then 1 tablet daily on days 2-5, Disp: 6 tablet, Rfl: 0  •  dexlansoprazole (Dexilant) 60 MG capsule, Take 1 capsule by mouth Daily., Disp: 30 capsule, Rfl: 5  •  dicyclomine (Bentyl) 10 MG capsule, Take 1 capsule by mouth 4 (Four) Times a Day Before Meals & at Bedtime., Disp: 120 capsule, Rfl: 11  •  Dilantin 100 MG capsule, TAKE 2 CAPSULES BY MOUTH 2 (TWO) TIMES A DAY., Disp: 120 capsule, Rfl: 5  •  famotidine (Pepcid) 40 MG tablet, Take 1 tablet by mouth 2 (Two) Times a Day., Disp: 60 tablet, Rfl: 5  •  fluticasone (FLONASE) 50 MCG/ACT nasal spray, , Disp: , Rfl:   •  HYDROcodone-acetaminophen (NORCO) 5-325 MG per tablet, Take 1 tablet by mouth Every 8 (Eight) Hours As Needed for Moderate Pain ., Disp: 5 tablet, Rfl: 0  •  ketorolac (TORADOL) 10 MG tablet, Take 1 tablet by mouth Every 6 (Six) Hours As Needed for Moderate Pain ., Disp: 16 tablet, Rfl: 0  •  lisinopril (PRINIVIL,ZESTRIL) 30 MG tablet, TAKE 1 TABLET BY MOUTH DAILY., Disp: 30 tablet, Rfl: 5  •  loratadine (CLARITIN) 10 MG tablet, , Disp: , Rfl:   •  magnesium citrate 1.745 GM/30ML solution solution, Take 296 mL by mouth Take As Directed for 2 doses. Take one full bottle at 4:00 PM and take second bottle at 10:00 PM., Disp: 592 mL, Rfl: 0  •  methocarbamol (ROBAXIN) 750 MG tablet, TAKE 1 TABLET BY MOUTH 2 (TWO) TIMES A DAY AS NEEDED FOR MUSCLE SPASMS., Disp: 60 tablet, Rfl: 5  •  mirtazapine (REMERON) 30 MG tablet, TAKE 1 AND 1/2 TABLETS BY MOUTH 2-3 HOURS BEFORE BEDTIME, Disp: 45 tablet, Rfl: 5  •  montelukast (SINGULAIR) 10 MG tablet, Take 10 mg by mouth., Disp: , Rfl:   •  mupirocin (BACTROBAN) 2 % ointment, Apply  topically to the appropriate area as directed 3 (Three) Times a Day., Disp: 30 g, Rfl: 2  •  potassium  "chloride (K-DUR,KLOR-CON) 10 MEQ CR tablet, , Disp: , Rfl:   •  tamsulosin (FLOMAX) 0.4 MG capsule 24 hr capsule, TAKE 1 CAPSULE BY MOUTH DAILY., Disp: 30 capsule, Rfl: 5  •  vitamin D (ERGOCALCIFEROL) 1.25 MG (36343 UT) capsule capsule, , Disp: , Rfl:       The following portions of the patient's history were reviewed and updated as appropriate: allergies, current medications, past family history, past medical history, past social history, past surgical history and problem list.    Social History     Tobacco Use   • Smoking status: Former Smoker     Packs/day: 1.50     Years: 17.00     Pack years: 25.50     Types: Cigars, Cigarettes     Start date: 2010     Quit date: 2021     Years since quittin.7   • Smokeless tobacco: Never Used   Vaping Use   • Vaping Use: Never used   Substance Use Topics   • Alcohol use: No   • Drug use: No       ROS    Objective   Vitals:    22 1320   BP: 132/73   BP Location: Right arm   Patient Position: Sitting   Cuff Size: Adult   Pulse: 74   Temp: 97 °F (36.1 °C)   TempSrc: Infrared   SpO2: 99%   Weight: 109 kg (240 lb)   Height: 170.2 cm (67\")     Body mass index is 37.59 kg/m².        Vitals reviewed.   Constitutional:       Appearance: Healthy appearance. Well-developed and not in distress.   HENT:      Head: Normocephalic and atraumatic.   Pulmonary:      Effort: Pulmonary effort is normal.      Breath sounds: Normal breath sounds. No wheezing. No rales.   Cardiovascular:      Normal rate. Regular rhythm.      Murmurs: There is no murmur.      . No S3 and S4 gallop.   Abdominal:      General: Bowel sounds are normal.      Palpations: Abdomen is soft.   Musculoskeletal:      Comments: Right ankle boot noted Skin:     General: Skin is warm and dry.   Neurological:      Mental Status: Alert, oriented to person, place, and time and oriented to person, place and time.   Psychiatric:         Mood and Affect: Mood normal.         Behavior: Behavior normal.         Lab " Results   Component Value Date     02/02/2022    K 4.7 02/02/2022     02/02/2022    CO2 26.0 02/02/2022    BUN 18 02/02/2022    CREATININE 1.18 02/02/2022    GLUCOSE 98 02/02/2022    CALCIUM 9.1 02/02/2022    AST 31 02/02/2022    ALT 23 02/02/2022    ALKPHOS 97 02/02/2022    LABIL2 1.1 (L) 05/29/2016     Lab Results   Component Value Date    CKTOTAL 153 06/20/2019     Lab Results   Component Value Date    WBC 4.47 02/02/2022    HGB 16.2 02/02/2022    HCT 46.3 02/02/2022     02/02/2022     Lab Results   Component Value Date    INR 0.98 09/24/2019    INR 1.03 02/06/2018    INR 0.97 09/26/2017     Lab Results   Component Value Date    MG 1.6 08/21/2020     Lab Results   Component Value Date    TSH 3.000 02/02/2022    PSA 1.0 07/01/2021    CHLPL 232 (H) 04/05/2016    TRIG 69 02/02/2022    HDL 65 (H) 02/02/2022     (H) 02/02/2022      Lab Results   Component Value Date    BNP 3.0 09/26/2018           Procedures      Assessment/Plan    Diagnosis Plan   1. Precordial pain     2. Essential hypertension     3. Dyslipidemia                  Recommendations:    1. I have discussed the results of the Lexiscan stress test and echocardiogram with the patient today.  2. Continue to emphasize risk factor modification.  3. He has multiple medication allergies and cannot tolerate isosorbide or calcium channel blockers.  There are some possible interactions with Ranexa and some of the medications he takes for seizures.  Therefore, we will continue to monitor chest pains for now.  If they persist will consider further evaluation with CT coronary angiogram.  4. Follow-up in 3 months or sooner if needed.        Return in about 3 months (around 6/17/2022) for Recheck.    As always, I appreciate very much the opportunity to participate in the cardiovascular care of your patients.      With Best Regards,    BALDOMERO Horowitz

## 2022-03-17 NOTE — PROGRESS NOTES
Physical Therapy Daily Treatment Note      Patient: Jaquan Mckay   : 1970  Referring practitioner: Ronald S Dubin, MD  Date of Initial Visit: Type: THERAPY  Noted: 12/15/2021  Today's Date: 3/17/2022  Patient seen for 21 sessions       Visit Diagnoses:    ICD-10-CM ICD-9-CM   1. Left elbow pain  M25.522 719.42   2. Lateral epicondylitis of left elbow  M77.12 726.32       Subjective Evaluation    History of Present Illness    Subjective comment: Pt reports having 6/10 pain today in the left elbow.       Objective   See Exercise, Manual, and Modality Logs for complete treatment.       Assessment & Plan     Assessment    Assessment details: Tx today initiated with mh to left elbow; followed by manual stretching to wrist and elbow. Exercises increased with resistance and reps as indicated; followed by stm for decreased pain and improved mobility and US for decreased edema and ended with ice.  Pt demonstrated good effort with increased resistance with exercises.  Pt tolerated added ball push ups and UE bike well today. Pt reported 2/10 post pain.    Plan  Plan details: Will follow progressing elbow stability and decreased pain in order to return to sports and functional mobility.          Timed:         Manual Therapy:    14     mins  01253;     Therapeutic Exercise:    32     mins  85607;     Neuromuscular Jazmin:        mins  15137;    Therapeutic Activity:          mins  82967;     Gait Training:           mins  24416;     Ultrasound:     8     mins  24124;    Ionto                                   mins   47752  Self Care                            mins   31317  Canalith Repos         mins 81611      Un-Timed:  Electrical Stimulation:         mins  46609 (MC );  Dry Needling          mins self-pay  Traction          mins 69961      Timed Treatment:   54   mins   Total Treatment:     65   Mins(6min mh and 5 min ice)    Win Rodriguez PT  [place] License: [id number]      Electronically signed by Win Neville  GABRIELE Rodriguez, 03/17/22, 10:49 AM EDT

## 2022-03-21 ENCOUNTER — TREATMENT (OUTPATIENT)
Dept: PHYSICAL THERAPY | Facility: CLINIC | Age: 52
End: 2022-03-21

## 2022-03-21 DIAGNOSIS — M25.522 LEFT ELBOW PAIN: Primary | ICD-10-CM

## 2022-03-21 DIAGNOSIS — M77.12 LATERAL EPICONDYLITIS OF LEFT ELBOW: ICD-10-CM

## 2022-03-21 PROCEDURE — 97140 MANUAL THERAPY 1/> REGIONS: CPT | Performed by: PHYSICAL THERAPIST

## 2022-03-21 PROCEDURE — 97110 THERAPEUTIC EXERCISES: CPT | Performed by: PHYSICAL THERAPIST

## 2022-03-21 NOTE — PROGRESS NOTES
Physical Therapy Daily Treatment Note      Patient: Jaquan Mckay   : 1970  Referring practitioner: Ronald S Dubin, MD  Date of Initial Visit: Type: THERAPY  Noted: 12/15/2021  Today's Date: 3/21/2022  Patient seen for 22 sessions       Visit Diagnoses:    ICD-10-CM ICD-9-CM   1. Left elbow pain  M25.522 719.42   2. Lateral epicondylitis of left elbow  M77.12 726.32       Subjective Evaluation    History of Present Illness    Subjective comment: Pt reports having 6/10 pain today.  Pt reports he has been lifting in the weight room.       Objective   See Exercise, Manual, and Modality Logs for complete treatment.       Assessment & Plan     Assessment    Assessment details: Tx today consisted of MH to elbow followed by manual stretching of wrist and elbow; there ex progressed with increased resistance for improved stability and ended with stm to for decreased pain and decreased edema.  Pt responded well to tx with reports of 2/10 post pain.  Pt reported no distress during session today.    Plan  Plan details: Will follow progressing elbow mobility and increased stability.  May progress to kneeling push ups and increased wrist resistance.          Timed:         Manual Therapy:   12      mins  44035;     Therapeutic Exercise:    13     mins  56554;     Neuromuscular Jazmin:        mins  45369;    Therapeutic Activity:          mins  25335;     Gait Training:           mins  52990;     Ultrasound:          mins  77757;    Ionto                                   mins   82184  Self Care                            mins   00268  Canalith Repos         mins 20666      Un-Timed:  Electrical Stimulation:         mins  47844 ( );  Dry Needling          mins self-pay  Traction          mins 82665      Timed Treatment:   25   mins   Total Treatment:     40   Mins(tx charge abbreviated due to shared min)    Win Rodriguez PT  [place] License: [id number]      Electronically signed by Win Rodriguez PT, 22,  8:59 AM EDT

## 2022-03-23 ENCOUNTER — TELEPHONE (OUTPATIENT)
Dept: FAMILY MEDICINE CLINIC | Facility: CLINIC | Age: 52
End: 2022-03-23

## 2022-03-23 ENCOUNTER — OFFICE VISIT (OUTPATIENT)
Dept: FAMILY MEDICINE CLINIC | Facility: CLINIC | Age: 52
End: 2022-03-23

## 2022-03-23 VITALS
DIASTOLIC BLOOD PRESSURE: 74 MMHG | HEART RATE: 84 BPM | HEIGHT: 67 IN | BODY MASS INDEX: 37.2 KG/M2 | RESPIRATION RATE: 16 BRPM | WEIGHT: 237 LBS | SYSTOLIC BLOOD PRESSURE: 130 MMHG | TEMPERATURE: 97 F | OXYGEN SATURATION: 99 %

## 2022-03-23 DIAGNOSIS — S93.401A SPRAIN OF RIGHT ANKLE, UNSPECIFIED LIGAMENT, INITIAL ENCOUNTER: ICD-10-CM

## 2022-03-23 DIAGNOSIS — J34.89 NASAL SORE: ICD-10-CM

## 2022-03-23 DIAGNOSIS — F51.04 PSYCHOPHYSIOLOGICAL INSOMNIA: ICD-10-CM

## 2022-03-23 DIAGNOSIS — I10 ESSENTIAL HYPERTENSION: Primary | ICD-10-CM

## 2022-03-23 DIAGNOSIS — E66.09 CLASS 1 OBESITY DUE TO EXCESS CALORIES WITH SERIOUS COMORBIDITY AND BODY MASS INDEX (BMI) OF 33.0 TO 33.9 IN ADULT: ICD-10-CM

## 2022-03-23 PROCEDURE — 99214 OFFICE O/P EST MOD 30 MIN: CPT | Performed by: NURSE PRACTITIONER

## 2022-03-23 RX ORDER — HYDROCODONE BITARTRATE AND ACETAMINOPHEN 5; 325 MG/1; MG/1
1 TABLET ORAL EVERY 8 HOURS PRN
Qty: 9 TABLET | Refills: 0 | Status: SHIPPED | OUTPATIENT
Start: 2022-03-23 | End: 2022-09-01

## 2022-03-23 RX ORDER — ASENAPINE MALEATE 2.5 MG/1
1 TABLET SUBLINGUAL NIGHTLY
Qty: 30 TABLET | Refills: 0 | Status: SHIPPED | OUTPATIENT
Start: 2022-03-23 | End: 2022-03-24 | Stop reason: RX

## 2022-03-23 NOTE — PROGRESS NOTES
"Subjective   Jaquan Mckay is a 51 y.o. male.     Chief Complaint   Patient presents with   • Anxiety       History of Present Illness     Anxiety-chronic and ongoing.  With depression and insomnia.  He was added saphris 2.5 mg.   He reports that he did not receive the medication at his pharmacy.   Cardiology follow up-reports his testing was good.  He will follow up in September.  He reports that his BP has been stable and he has not had CP since before his heart testing.    Knee Doctor-reports that he has been back.   He has also been to  to see a provider he reports.   A \"baker's cyst\" is present.  He does plan to have a updated follow-up.  Fall at home-in the tub.  Reports the shower mat was not attached to the tub and when he stepped on it he turned his right ankle.  He did seek emergent care.  He was noted to have a sprained ankle.  He would like to have Ace wrap/support for his right ankle.  Nasal sore-continues to have intermittent sores.  He reports that he was given an ointment by a friend that he has used that dried up the sore.  He is interested in resuming his care at the allergy specialist.   He felt that he was doing better when he was doing immunotherapy.    The following portions of the patient's history were reviewed and updated as appropriate: CC, ROS, allergies, current medications, past family history, past medical history, past social history, past surgical history and problem list.      Review of Systems   Constitutional: Positive for fatigue. Negative for activity change, appetite change and fever.   HENT: Negative for congestion, ear pain, facial swelling, nosebleeds, rhinorrhea, sinus pressure, sore throat and trouble swallowing.    Eyes: Negative for blurred vision, double vision and redness.   Respiratory: Negative for cough, chest tightness, shortness of breath and wheezing.    Cardiovascular: Negative for chest pain, palpitations and leg swelling.   Gastrointestinal: Negative for " "abdominal pain, blood in stool, constipation, diarrhea, nausea and vomiting.   Endocrine: Negative.    Genitourinary: Negative for dysuria, flank pain, frequency, hematuria and urgency.   Musculoskeletal: Positive for arthralgias, back pain, gait problem, myalgias and neck stiffness.   Skin: Negative.    Allergic/Immunologic: Negative.    Neurological: Negative for dizziness, weakness, light-headedness and headache.   Hematological: Negative.    Psychiatric/Behavioral: Positive for dysphoric mood and stress. Negative for self-injury, sleep disturbance and suicidal ideas. The patient is nervous/anxious.    All other systems reviewed and are negative.      Objective     /74   Pulse 84   Temp 97 °F (36.1 °C)   Resp 16   Ht 170.2 cm (67.01\")   Wt 108 kg (237 lb)   SpO2 99%   BMI 37.11 kg/m²     Physical Exam  Vitals reviewed.   Constitutional:       General: He is not in acute distress.     Appearance: He is well-developed. He is obese. He is not diaphoretic.   HENT:      Head: Normocephalic and atraumatic.      Jaw: No tenderness.      Comments: Oropharynx not examined.  Patient is presently wearing a face covering/mask due to COVID-19 pandemic.     Right Ear: Hearing, tympanic membrane, ear canal and external ear normal.      Left Ear: Hearing, tympanic membrane, ear canal and external ear normal.   Eyes:      General: Lids are normal. No scleral icterus.     Extraocular Movements:      Right eye: Normal extraocular motion and no nystagmus.      Left eye: Normal extraocular motion and no nystagmus.      Conjunctiva/sclera: Conjunctivae normal.      Pupils: Pupils are equal, round, and reactive to light.   Neck:      Thyroid: No thyromegaly or thyroid tenderness.      Vascular: No carotid bruit or JVD.      Trachea: No tracheal tenderness.   Cardiovascular:      Rate and Rhythm: Normal rate and regular rhythm.      Pulses:           Dorsalis pedis pulses are 2+ on the right side and 2+ on the left side.    "     Posterior tibial pulses are 2+ on the right side and 2+ on the left side.      Heart sounds: Normal heart sounds, S1 normal and S2 normal. No murmur heard.  Pulmonary:      Effort: Pulmonary effort is normal. No accessory muscle usage, prolonged expiration or respiratory distress.      Breath sounds: Normal breath sounds.   Chest:      Chest wall: No tenderness.   Abdominal:      General: Bowel sounds are normal. There is no distension.      Palpations: Abdomen is soft. There is no mass.      Tenderness: There is no abdominal tenderness.   Musculoskeletal:         General: Tenderness present.      Cervical back: Normal range of motion and neck supple. Tenderness present.      Thoracic back: Tenderness present. Decreased range of motion.      Lumbar back: Spasms and tenderness present. Decreased range of motion.      Right knee: Bony tenderness present. Decreased range of motion. Tenderness present.      Left knee: Bony tenderness present. Decreased range of motion. Tenderness present.      Right lower leg: No edema.      Left lower leg: No edema.      Right ankle: Tenderness present. Decreased range of motion. Normal pulse.      Comments: No muscular atrophy or flaccidity.   Lymphadenopathy:      Head:      Right side of head: No submental or submandibular adenopathy.      Left side of head: No submental or submandibular adenopathy.      Cervical: No cervical adenopathy.      Right cervical: No superficial cervical adenopathy.     Left cervical: No superficial cervical adenopathy.   Skin:     General: Skin is warm and dry.      Capillary Refill: Capillary refill takes less than 2 seconds.      Coloration: Skin is not jaundiced or pale.      Findings: No erythema.      Nails: There is no clubbing.   Neurological:      Mental Status: He is alert and oriented to person, place, and time.      Cranial Nerves: No cranial nerve deficit or facial asymmetry.      Sensory: No sensory deficit.      Motor: No weakness,  tremor, atrophy or abnormal muscle tone.      Coordination: Coordination normal.      Gait: Gait abnormal (moderately antalgic).      Deep Tendon Reflexes: Reflexes are normal and symmetric.   Psychiatric:         Attention and Perception: He is attentive.         Mood and Affect: Mood is anxious and depressed. Affect is flat.         Speech: Speech normal.         Behavior: Behavior normal. Behavior is cooperative.         Thought Content: Thought content normal.         Cognition and Memory: Cognition normal.         Judgment: Judgment normal.       Assessment/Plan     Diagnoses and all orders for this visit:    1. Essential hypertension (Primary)  Assessment & Plan:  Continue lisinopril 30 mg.  Continue under the care of cardiology.  Ambulatory BP monitoring either at home or random community checks.  Patient to report continued elevations >140/90.  Patient may come by office for checks if needed.       2. Psychophysiological insomnia  Comments:  Trial of Saphris 2.5 mg.  Patient to decrease his Remeron back to not more than 1.5 tablets.  Continue under the care of [facility]  Overview:  With depression and anxiety      Orders:  -     Discontinue: Asenapine Maleate 2.5 MG sublingual tablet; Place 1 tablet under the tongue Every Night.  Dispense: 30 tablet; Refill: 0    3. Class 1 obesity due to excess calories with serious comorbidity and body mass index (BMI) of 33.0 to 33.9 in adult  Assessment & Plan:  Dietary counseling provided:  Healthy food choices including fruits and vegetables, limit soda and junk foods, adequate water intake.  Increase in physical activity advised at least 30 minutes per day 3-5 days per week.        4. Sprain of right ankle, unspecified ligament, initial encounter  -     HYDROcodone-acetaminophen (NORCO) 5-325 MG per tablet; Take 1 tablet by mouth Every 8 (Eight) Hours As Needed for Moderate Pain .  Dispense: 9 tablet; Refill: 0  -     Elastic Bandages & Supports (ACE Ankle Brace) misc;  1 each Daily.  Dispense: 1 each; Refill: 0    5. Nasal sore  Comments:  We will follow up with allergy specialist to see if patient may resume his care there.  We will let patient know outcome       Patient's Body mass index is 37.11 kg/m². indicating that he is morbidly obese (BMI > 40 or > 35 with obesity - related health condition). Obesity-related health conditions include the following: hypertension, dyslipidemias, GERD and osteoarthritis. Obesity is unchanged. BMI is is above average. We discussed portion control, increasing exercise and pharmacologic options including Adipex..       Understands disease processes and need for medications.  Understands reasons for urgent and emergent care.  Patient (& family) verbalized agreement for treatment plan.   Emotional support and active listening provided.  Patient provided time to verbalize feelings.    CHAVEZ reviewed today and consistent.  Will refill prescribed controlled medication today.  Patient is aware they cannot receive narcotics from any other provider except if under care of pain management or speciality clinic.  Risk and benefits of medication use has been reviewed.  History and physical exam exhibit continued safe and appropriate use of controlled substances.  The patient is aware of the potential for addiction and dependence.  This patient has been made aware of the appropriate use of such medications, including potential risk of somnolence, limited ability to drive and / or work safely, and potential for overdose.  Patient understands not to take medication and drive until they know how medication may affect their cognition/decision making.   It has also been made clear that these medications are for use by this patient only, without concomitant use of alcohol or other substances unless prescribed/advised by medical provider.  Patient understands they may be subject to UDS and pill counts at random.    Patient considered to be low risk for addiction  due to use of single controlled medications.  Patient understands and accepts these risks.  Patient need for medication will be reassessed at each visit.  Doses will be adjusted according to patient need and findings.    Goal of [place]: Patient will not have any adverse reactions of medication.  Patient will have reduction in weight with use of Adipex as directed with simultaneous diet and lifestyle changes.    Reviewed hospital ER records.  Discussed findings with patient.    RTC 1 month, sooner if needed for problems or concerns            This document has been electronically signed by:  BALDOMERO Thao, FNP-C    Dragon disclaimer:  Part of this note may be an electronic transcription/translation of spoken language to printed text using the Dragon Dictation System.

## 2022-03-23 NOTE — TELEPHONE ENCOUNTER
----- Message from BALDOMERO Thao sent at 3/23/2022 12:00 PM EDT -----  Regarding: RE: follow up with allergy doc  I will let the patient know   ----- Message -----  From: Gissell Isidro MA  Sent: 3/23/2022  11:42 AM EDT  To: BALDOMERO Thao  Subject: FW: follow up with allergy doc                   He was never dismissed. He just needs to call and schedule appt    ----- Message -----  From: Tiera Valenzuela APRN  Sent: 3/23/2022  11:32 AM EDT  To: Gissell Isidro MA  Subject: follow up with allergy doc                       He would like to resume his care at the allergy specialist.  Will you see if they will accept him back as a patient?

## 2022-03-24 ENCOUNTER — TELEPHONE (OUTPATIENT)
Dept: FAMILY MEDICINE CLINIC | Facility: CLINIC | Age: 52
End: 2022-03-24

## 2022-03-24 NOTE — TELEPHONE ENCOUNTER
----- Message from BALDOMERO Thao sent at 3/24/2022  3:48 PM EDT -----  I sent vraylar    ----- Message -----  From: Gissell Isidro MA  Sent: 3/24/2022   2:09 PM EDT  To: BALDOMERO Thao    Different medication    ----- Message -----  From: Tiera Valenzuela APRN  Sent: 3/24/2022   2:04 PM EDT  To: Gissell Isidro MA    A different med or a different dose?    ----- Message -----  From: Gissell Isidro MA  Sent: 3/23/2022   4:47 PM EDT  To: BALDOMERO Thao    Asenapine Maleate 2.5 MG sublingual tablet is on back order, pharmacy wants something else called in.

## 2022-03-25 ENCOUNTER — TREATMENT (OUTPATIENT)
Dept: PHYSICAL THERAPY | Facility: CLINIC | Age: 52
End: 2022-03-25

## 2022-03-25 DIAGNOSIS — M77.12 LATERAL EPICONDYLITIS OF LEFT ELBOW: ICD-10-CM

## 2022-03-25 DIAGNOSIS — M25.522 LEFT ELBOW PAIN: Primary | ICD-10-CM

## 2022-03-25 PROCEDURE — 97110 THERAPEUTIC EXERCISES: CPT | Performed by: PHYSICAL THERAPIST

## 2022-03-25 NOTE — PROGRESS NOTES
Physical Therapy Daily Treatment Note      Patient: Jaquan Mckay   : 1970  Referring practitioner: Ronald S Dubin, MD  Date of Initial Visit: Type: THERAPY  Noted: 12/15/2021  Today's Date: 3/25/2022  Patient seen for 23 sessions       Visit Diagnoses:    ICD-10-CM ICD-9-CM   1. Left elbow pain  M25.522 719.42   2. Lateral epicondylitis of left elbow  M77.12 726.32       Subjective:  Patient states he had a fall in the shower at home on Monday, and hit his left elbow.  Pt states he was seen by his PCP yesterday, and they checked him over well, and done several x rays.  Pt reports they done an ultrasound of the elbow with no positive findings.  Pt reports 6/10 left elbow pain prior to tx.      Objective   See Exercise, Manual, and Modality Logs for complete treatment.       Assessment/Plan:  Supervising therapist, Hanna Burks, PT, DPT notified and aware of pt's subjective reports.  PT assessed patient's elbow with tenderness to palpation noted  along medial epicondyle region; no bruising or increased edema observed.  PT educated patient to reduce resistance, and perform activities to his tolerance.  Pt verbalized understanding. Pt received MH to right elbow x 5 min f/b therex as listed and pulsed US following. Manual therapy held, and resistance reduced during exercise.  Exercise performed w/ continued focus on improved left elbow range of motion, improved left UE and  strength, as tolerated.  Cryotherapy applied at conclusion.  No signs of distress or adverse reactions observed during, and/ or following tx.  Will continue to monitor pt's symptoms.  Continue w/ PT's POC.       Timed:         Manual Therapy:         mins  57823;     Therapeutic Exercise:    39     mins  23674;     Neuromuscular Jazmin:        mins  90597;    Therapeutic Activity:          mins  99819;     Gait Training:           mins  08483;     Ultrasound:     8     mins  06967;    Ionto                                   mins    33880  Self Care                            mins   49057  Canalith Repos         mins 99044      Un-Timed:  Electrical Stimulation:         mins  45527 ( );  Dry Needling          mins self-pay  Traction          mins 30875      Timed Treatment:  47    mins   Total Treatment:     57   mins (CP: x 5min, MH: x 5min)    Leighann Brown. [place], NEIL  [place] License: [id number]

## 2022-03-28 ENCOUNTER — TREATMENT (OUTPATIENT)
Dept: PHYSICAL THERAPY | Facility: CLINIC | Age: 52
End: 2022-03-28

## 2022-03-28 DIAGNOSIS — M25.522 LEFT ELBOW PAIN: Primary | ICD-10-CM

## 2022-03-28 DIAGNOSIS — M77.12 LATERAL EPICONDYLITIS OF LEFT ELBOW: ICD-10-CM

## 2022-03-28 PROCEDURE — 97110 THERAPEUTIC EXERCISES: CPT | Performed by: PHYSICAL THERAPIST

## 2022-03-28 PROCEDURE — 97530 THERAPEUTIC ACTIVITIES: CPT | Performed by: PHYSICAL THERAPIST

## 2022-03-28 NOTE — PROGRESS NOTES
Physical Therapy Daily Treatment Note      Patient: Jaquan Mckay   : 1970  Referring practitioner: Ronald S Dubin, MD  Date of Initial Visit: Type: THERAPY  Noted: 12/15/2021  Today's Date: 3/28/2022  Patient seen for 24 sessions       Visit Diagnoses:    ICD-10-CM ICD-9-CM   1. Left elbow pain  M25.522 719.42   2. Lateral epicondylitis of left elbow  M77.12 726.32       Subjective Evaluation    History of Present Illness    Subjective comment: The patient reports 7/10 left elbow pain prior to today's session. He states the inside of the elbow is sore.Pain  Current pain ratin           Objective   See Exercise, Manual, and Modality Logs for complete treatment.       Assessment & Plan     Assessment    Assessment details: Today's treatment session was initiated with STM to the medial, lateral, and posterior elbow to address muscle tightness and inflammation. STM was gentle in intensity secondary to patient reports of tenderness. Therapeutic exercises and activities were then performed for improved range of motion, strength, and function. The patient reported no increase in left elbow pain following therapeutic exercises. Moist heat was applied to the patient's left elbow at the conclusion of today's session, with no change in pain reported. The patient declined therapeutic ultrasound.    Plan  Plan details: Progress as indicated to achieve max gains.          Timed:         Manual Therapy:         mins  85956;     Therapeutic Exercise:    27   mins  75948;     Neuromuscular Jazmin:        mins  84775;    Therapeutic Activity:    14   mins  60773;     Gait Training:           mins  03325;     Ultrasound:          mins  42056;    Ionto                                   mins   46935  Self Care                            mins   75298  Canalith Repos         mins 33073      Un-Timed:  Electrical Stimulation:         mins  73317 ( );  Dry Needling          mins self-pay  Traction          mins  39373      Timed Treatment:   41   mins   Total Treatment:     46   mins (5 minutes moist heat)    Ashley Claudene Dalton, PT  [place] License: [id number]

## 2022-03-31 ENCOUNTER — TREATMENT (OUTPATIENT)
Dept: PHYSICAL THERAPY | Facility: CLINIC | Age: 52
End: 2022-03-31

## 2022-03-31 DIAGNOSIS — M25.522 LEFT ELBOW PAIN: Primary | ICD-10-CM

## 2022-03-31 DIAGNOSIS — M77.12 LATERAL EPICONDYLITIS OF LEFT ELBOW: ICD-10-CM

## 2022-03-31 PROCEDURE — 97140 MANUAL THERAPY 1/> REGIONS: CPT | Performed by: PHYSICAL THERAPIST

## 2022-03-31 PROCEDURE — 97110 THERAPEUTIC EXERCISES: CPT | Performed by: PHYSICAL THERAPIST

## 2022-03-31 RX ORDER — ARM BRACE
1 EACH MISCELLANEOUS DAILY
Qty: 1 EACH | Refills: 0 | Status: SHIPPED | OUTPATIENT
Start: 2022-03-31

## 2022-03-31 NOTE — PROGRESS NOTES
Physical Therapy Daily Treatment Note      Patient: Jaquan Mckay   : 1970  Referring practitioner: Ronald S Dubin, MD  Date of Initial Visit: Type: THERAPY  Noted: 12/15/2021  Today's Date: 3/31/2022  Patient seen for 25 sessions       Visit Diagnoses:    ICD-10-CM ICD-9-CM   1. Left elbow pain  M25.522 719.42   2. Lateral epicondylitis of left elbow  M77.12 726.32       Subjective Evaluation    History of Present Illness    Subjective comment: Pt reports having 6/10 pain today.       Objective   See Exercise, Manual, and Modality Logs for complete treatment.       Assessment & Plan     Assessment    Assessment details: Tx today consisted of mh to elbow followed by there ex for improved elbow mobility and stability with added resistance and bike; followed by STM to elbow for decreased pain and edema.  Pt reported 3/10 post pain.    Plan  Plan details: Will follow progressing elbow stability and decreased pain.  Will cont to progress as patient tolerates.          Timed:         Manual Therapy:    12     mins  92155;     Therapeutic Exercise:    30     mins  70011;     Neuromuscular Jazmin:        mins  68815;    Therapeutic Activity:          mins  32857;     Gait Training:           mins  61466;     Ultrasound:          mins  12308;    Ionto                                   mins   21416  Self Care                            mins   57913  Canalith Repos         mins 62391      Un-Timed:  Electrical Stimulation:         mins  20493 ( );  Dry Needling          mins self-pay  Traction          mins 29657      Timed Treatment:   42   mins   Total Treatment:     47   Mins(5min mh)    Win Rodriguez PT  [place] License: [id number]      Electronically signed by Win Rodriguez PT, 22, 12:54 PM EDT

## 2022-04-04 ENCOUNTER — TREATMENT (OUTPATIENT)
Dept: PHYSICAL THERAPY | Facility: CLINIC | Age: 52
End: 2022-04-04

## 2022-04-04 DIAGNOSIS — M25.522 LEFT ELBOW PAIN: Primary | ICD-10-CM

## 2022-04-04 DIAGNOSIS — M77.12 LATERAL EPICONDYLITIS OF LEFT ELBOW: ICD-10-CM

## 2022-04-04 PROCEDURE — 97110 THERAPEUTIC EXERCISES: CPT | Performed by: PHYSICAL THERAPIST

## 2022-04-04 PROCEDURE — 97140 MANUAL THERAPY 1/> REGIONS: CPT | Performed by: PHYSICAL THERAPIST

## 2022-04-04 NOTE — PROGRESS NOTES
Physical Therapy Treatment/Discharge  Patient: Jaquan Mckay   : 1970  Diagnosis/ICD-10 Code:  Left elbow pain [M25.522]  Referring practitioner: Ronald S Dubin, MD  Date of Initial Visit: Type: THERAPY  Noted: 12/15/2021  Today's Date: 2022  Patient seen for 26 sessions         Visit Diagnoses:    ICD-10-CM ICD-9-CM   1. Left elbow pain  M25.522 719.42   2. Lateral epicondylitis of left elbow  M77.12 726.32         Subjective Questionnaire: QuickDASH: 75% impaired  Clinical Progress: improved  Home Program Compliance: Yes      Subjective Evaluation    History of Present Illness    Subjective comment: Patient reports that he continues to have left elbow pain, noting that he had difficulty sleeping because of pain.  Patient states that he is going for his nerve conduction study on 2022.Pain  Current pain ratin  At best pain rating: 3  At worst pain ratin             Objective          Active Range of Motion     Left Elbow   Flexion: 138 degrees   Extension: 0 degrees   Forearm supination: 78 degrees   Forearm pronation: 80 degrees     Strength/Myotome Testing     Left Elbow   Flexion: 4+  Extension: 4+  Forearm supination: 4+  Forearm pronation: 4+          Assessment & Plan     Assessment    Assessment details: Patient whas been attending physical therapy for left elbow pain; he has attended therapy for a total of 26 sessions.  Patient has met 2/3 STGs and 2/3 LTGs.  Patient has shown improvements in elbow ROM and strength; however, he continues to report elevated pain levels.  He currently notes a 75% functional mobility impairment on the QuickDash.  Patient will be discharged, due to reaching maximum level of function at this time.  Patient is encouraged to continue with HEP following discharge from therapy.      Goals  Plan Goals: STGs: 4 weeks  1. Patient will be independent/compliant with HEP.  Met  2. Patient will report pain no greater than 6/10 when performing self-care  activities.  Not met-up to 9/10  3. Left elbow AROM will improve to at least 10-90 to allow for greater ease with daily tasks.  Met    LTGs: 8 weeks  1. Patient will improve left  strength to at least 80# to allow for greater ease with opening items.  Met-80#  2. Left elbow AROM will improve to WFL to allow for greater ease with daily tasks.  Met  3. Patient will report left elbow pain no greater than 4/10 with moderate lifting.  Not met-up to 8/10    Plan  Therapy options: will not be seen for skilled therapy services  Treatment plan discussed with: patient           Recommendations: Discharge    Timed:         Manual Therapy:    14     mins  12792;     Therapeutic Exercise:    30     mins  73125;     Neuromuscular Jazmin:        mins  48367;    Therapeutic Activity:          mins  08403;     Gait Training:           mins  98888;     Ultrasound:          mins  01478;    Ionto                                   mins   68726  Self Care                            mins   33778  Canalith Repos         mins 48201      Un-Timed:  Electrical Stimulation:         mins  78870 (MC );  Dry Needling          mins self-pay  Traction          mins 89174  Re-Eval                               mins  21336  Moist heat-5 min    Timed Treatment:   44   mins   Total Treatment:     49   mins          PT: Hanna Burks PT     [place] License:  [id number]    Electronically signed by Hanna Burks PT, 04/04/22, 2:14 PM EDT    Certification Period: 4/4/2022 thru 7/2/2022  I certify that the therapy services are furnished while this patient is under my care.  The services outlined above are required by this patient, and will be reviewed every 90 days.         Physician Signature:__________________________________________________    PHYSICIAN: Dubin, Ronald S, MD  NPI: [id number]                                      DATE:  :     Please sign and return via fax to [email or web address] . Thank you, [facility]

## 2022-04-20 ENCOUNTER — OFFICE VISIT (OUTPATIENT)
Dept: FAMILY MEDICINE CLINIC | Facility: CLINIC | Age: 52
End: 2022-04-20

## 2022-04-20 VITALS
TEMPERATURE: 97.2 F | DIASTOLIC BLOOD PRESSURE: 80 MMHG | HEIGHT: 67 IN | SYSTOLIC BLOOD PRESSURE: 132 MMHG | BODY MASS INDEX: 37.1 KG/M2 | HEART RATE: 63 BPM | OXYGEN SATURATION: 99 % | WEIGHT: 236.4 LBS

## 2022-04-20 DIAGNOSIS — I10 ESSENTIAL HYPERTENSION: Primary | ICD-10-CM

## 2022-04-20 DIAGNOSIS — M79.2 NERVE PAIN: ICD-10-CM

## 2022-04-20 DIAGNOSIS — E55.9 VITAMIN D DEFICIENCY: ICD-10-CM

## 2022-04-20 DIAGNOSIS — M25.522 LEFT ELBOW PAIN: ICD-10-CM

## 2022-04-20 DIAGNOSIS — K21.9 GASTROESOPHAGEAL REFLUX DISEASE WITHOUT ESOPHAGITIS: ICD-10-CM

## 2022-04-20 DIAGNOSIS — R56.9 SEIZURES: ICD-10-CM

## 2022-04-20 DIAGNOSIS — R30.0 DYSURIA: ICD-10-CM

## 2022-04-20 DIAGNOSIS — R73.01 ABNORMAL FASTING GLUCOSE: ICD-10-CM

## 2022-04-20 DIAGNOSIS — F51.01 PRIMARY INSOMNIA: ICD-10-CM

## 2022-04-20 PROCEDURE — 99214 OFFICE O/P EST MOD 30 MIN: CPT | Performed by: NURSE PRACTITIONER

## 2022-04-20 RX ORDER — MOMETASONE FUROATE 50 UG/1
SPRAY, METERED NASAL
COMMUNITY
Start: 2022-04-05

## 2022-04-20 RX ORDER — MIRTAZAPINE 30 MG/1
45 TABLET, FILM COATED ORAL NIGHTLY
Qty: 45 TABLET | Refills: 5 | Status: SHIPPED | OUTPATIENT
Start: 2022-04-20 | End: 2022-10-10 | Stop reason: SDUPTHER

## 2022-04-20 RX ORDER — FLUTICASONE PROPIONATE 44 MCG
AEROSOL WITH ADAPTER (GRAM) INHALATION
COMMUNITY
Start: 2022-04-05 | End: 2023-02-14 | Stop reason: DRUGHIGH

## 2022-04-20 RX ORDER — ERGOCALCIFEROL 1.25 MG/1
50000 CAPSULE ORAL
Qty: 4 CAPSULE | Refills: 5 | Status: SHIPPED | OUTPATIENT
Start: 2022-04-20 | End: 2022-10-10 | Stop reason: SDUPTHER

## 2022-04-20 RX ORDER — LISINOPRIL 30 MG/1
30 TABLET ORAL DAILY
Qty: 30 TABLET | Refills: 5 | Status: SHIPPED | OUTPATIENT
Start: 2022-04-20 | End: 2022-11-17

## 2022-04-20 RX ORDER — DEXLANSOPRAZOLE 60 MG/1
60 CAPSULE, DELAYED RELEASE ORAL DAILY
Qty: 30 CAPSULE | Refills: 5 | Status: SHIPPED | OUTPATIENT
Start: 2022-04-20 | End: 2022-10-10 | Stop reason: SDUPTHER

## 2022-04-20 RX ORDER — PHENYTOIN SODIUM 100 MG/1
200 CAPSULE, EXTENDED RELEASE ORAL 2 TIMES DAILY
Qty: 120 CAPSULE | Refills: 5 | Status: SHIPPED | OUTPATIENT
Start: 2022-04-20 | End: 2022-10-10 | Stop reason: SDUPTHER

## 2022-05-05 ENCOUNTER — LAB (OUTPATIENT)
Dept: LAB | Facility: HOSPITAL | Age: 52
End: 2022-05-05

## 2022-05-05 PROCEDURE — 83036 HEMOGLOBIN GLYCOSYLATED A1C: CPT | Performed by: NURSE PRACTITIONER

## 2022-05-05 PROCEDURE — 84443 ASSAY THYROID STIM HORMONE: CPT | Performed by: NURSE PRACTITIONER

## 2022-05-05 PROCEDURE — 80061 LIPID PANEL: CPT | Performed by: NURSE PRACTITIONER

## 2022-05-05 PROCEDURE — 82306 VITAMIN D 25 HYDROXY: CPT | Performed by: NURSE PRACTITIONER

## 2022-05-05 PROCEDURE — 84439 ASSAY OF FREE THYROXINE: CPT | Performed by: NURSE PRACTITIONER

## 2022-05-05 PROCEDURE — 80053 COMPREHEN METABOLIC PANEL: CPT | Performed by: NURSE PRACTITIONER

## 2022-05-05 PROCEDURE — 85025 COMPLETE CBC W/AUTO DIFF WBC: CPT | Performed by: NURSE PRACTITIONER

## 2022-05-05 PROCEDURE — 82607 VITAMIN B-12: CPT | Performed by: NURSE PRACTITIONER

## 2022-05-05 PROCEDURE — 80185 ASSAY OF PHENYTOIN TOTAL: CPT | Performed by: NURSE PRACTITIONER

## 2022-05-18 ENCOUNTER — HOSPITAL ENCOUNTER (OUTPATIENT)
Dept: CT IMAGING | Facility: HOSPITAL | Age: 52
Discharge: HOME | End: 2022-05-18

## 2022-05-18 ENCOUNTER — HOSPITAL ENCOUNTER (OUTPATIENT)
Dept: GENERAL RADIOLOGY | Facility: HOSPITAL | Age: 52
Discharge: HOME | End: 2022-05-18

## 2022-05-18 ENCOUNTER — OFFICE VISIT (OUTPATIENT)
Dept: FAMILY MEDICINE CLINIC | Facility: CLINIC | Age: 52
End: 2022-05-18

## 2022-05-18 VITALS
HEART RATE: 108 BPM | OXYGEN SATURATION: 99 % | WEIGHT: 238.6 LBS | DIASTOLIC BLOOD PRESSURE: 84 MMHG | HEIGHT: 67 IN | SYSTOLIC BLOOD PRESSURE: 112 MMHG | BODY MASS INDEX: 37.45 KG/M2 | TEMPERATURE: 98.4 F

## 2022-05-18 DIAGNOSIS — K21.9 GASTROESOPHAGEAL REFLUX DISEASE WITHOUT ESOPHAGITIS: ICD-10-CM

## 2022-05-18 DIAGNOSIS — N20.0 RENAL STONE: ICD-10-CM

## 2022-05-18 DIAGNOSIS — R21 RASH AND OTHER NONSPECIFIC SKIN ERUPTION: Primary | ICD-10-CM

## 2022-05-18 DIAGNOSIS — M25.522 LEFT ELBOW PAIN: ICD-10-CM

## 2022-05-18 DIAGNOSIS — F41.1 GENERALIZED ANXIETY DISORDER: ICD-10-CM

## 2022-05-18 DIAGNOSIS — M79.2 NERVE PAIN: ICD-10-CM

## 2022-05-18 DIAGNOSIS — I10 ESSENTIAL HYPERTENSION: ICD-10-CM

## 2022-05-18 DIAGNOSIS — M25.50 MULTIPLE JOINT PAIN: ICD-10-CM

## 2022-05-18 PROCEDURE — 99214 OFFICE O/P EST MOD 30 MIN: CPT | Performed by: NURSE PRACTITIONER

## 2022-05-18 PROCEDURE — 74018 RADEX ABDOMEN 1 VIEW: CPT | Performed by: RADIOLOGY

## 2022-05-18 PROCEDURE — 73200 CT UPPER EXTREMITY W/O DYE: CPT

## 2022-05-18 PROCEDURE — 73200 CT UPPER EXTREMITY W/O DYE: CPT | Performed by: RADIOLOGY

## 2022-05-18 PROCEDURE — 74018 RADEX ABDOMEN 1 VIEW: CPT

## 2022-05-18 RX ORDER — CLOBETASOL PROPIONATE 0.5 MG/G
1 OINTMENT TOPICAL 2 TIMES DAILY
Qty: 60 G | Refills: 2 | Status: SHIPPED | OUTPATIENT
Start: 2022-05-18

## 2022-05-18 RX ORDER — DIPHENHYDRAMINE HCL 25 MG
25-50 TABLET ORAL DAILY PRN
Qty: 60 TABLET | Refills: 1 | Status: SHIPPED | OUTPATIENT
Start: 2022-05-18 | End: 2022-09-01 | Stop reason: SDUPTHER

## 2022-05-18 NOTE — PROGRESS NOTES
"Subjective   Jaquan Mckay is a 51 y.o. male.     Chief Complaint   Patient presents with   • Hypertension       History of Present Illness     Hypertension-chronic and ongoing.  Blood pressure stable today. Patient is currently taking lisinopril 30 mg.  No negative side effects.  He is under the care of cardiology.  He reports no recent CP.    GERD-chronic and ongoing.  Patient has been under the care of GI.  He is currently on Dexilant 60 and Bentyl 10 mg.  He denies any negative side effects.  Chronic allergic rhinitis-ongoing.  Patient is currently taking Flonase nasal spray and Claritin 10 mg.  He is also on montelukast 10 mg.  He is under the care of the allergy and asthma specialist.  He is receiving immunotherapy.  Depression and anxiety- has failed multiple meds.  He is currently on Remeron 1.5 tablets nightly for insomnia and depression.  He continues to have some feelings of being down.  Rash on sides of hands-noted last night.  He reports is itching in nature.  He reports he took Claritin last night for the itching.  He reports no new exposures.   He has not been wearing gloves.  The rash is on top of his hands and stops just above his wrist line.    Elbow provider-reports that the person who had been taking care of his elbow pain has \"moved away\".  He continues to have elbow pain intermittently.  No edema or erythema.  No crepitus.   Ear complaint-has not heard back from audiology.  He feels he constantly has to pop his left ear.  He has been advised he needs Aides.      The following portions of the patient's history were reviewed and updated as appropriate: CC, ROS, allergies, current medications, past family history, past medical history, past social history, past surgical history and problem list.    Review of Systems   Constitutional: Positive for fatigue. Negative for activity change, appetite change, unexpected weight gain and unexpected weight loss.   HENT: Negative for congestion, ear pain, " "postnasal drip, rhinorrhea, sore throat, swollen glands, trouble swallowing and voice change.    Eyes: Negative for pain and visual disturbance.   Respiratory: Negative for cough, chest tightness, shortness of breath and wheezing.    Cardiovascular: Negative for chest pain, palpitations and leg swelling.   Gastrointestinal: Negative for abdominal pain, blood in stool, constipation, diarrhea, nausea, vomiting and indigestion.   Endocrine: Negative.    Genitourinary: Negative for dysuria, hematuria and urgency.   Musculoskeletal: Positive for arthralgias, back pain and myalgias. Negative for gait problem and joint swelling.   Skin: Positive for rash. Negative for color change and skin lesions.   Allergic/Immunologic: Negative.    Neurological: Negative for dizziness, syncope, weakness, numbness, headache and confusion.   Hematological: Negative.    Psychiatric/Behavioral: Positive for dysphoric mood, sleep disturbance and stress. Negative for self-injury and suicidal ideas. The patient is nervous/anxious.    All other systems reviewed and are negative.      Objective     /84   Pulse 108   Temp 98.4 °F (36.9 °C)   Ht 170.2 cm (67.01\")   Wt 108 kg (238 lb 9.6 oz)   SpO2 99%   BMI 37.36 kg/m²     Physical Exam  Vitals reviewed.   Constitutional:       General: He is not in acute distress.     Appearance: He is well-developed. He is obese. He is not diaphoretic.   HENT:      Head: Normocephalic and atraumatic.      Jaw: No tenderness.      Comments: Oropharynx not examined.  Patient is presently wearing a face covering/mask due to COVID-19 pandemic.     Right Ear: Hearing, tympanic membrane, ear canal and external ear normal.      Left Ear: Hearing, tympanic membrane, ear canal and external ear normal.   Eyes:      General: Lids are normal. No scleral icterus.     Extraocular Movements:      Right eye: Normal extraocular motion and no nystagmus.      Left eye: Normal extraocular motion and no nystagmus.      " Conjunctiva/sclera: Conjunctivae normal.      Pupils: Pupils are equal, round, and reactive to light.   Neck:      Thyroid: No thyromegaly or thyroid tenderness.      Vascular: No carotid bruit or JVD.      Trachea: No tracheal tenderness.   Cardiovascular:      Rate and Rhythm: Normal rate and regular rhythm.      Pulses:           Dorsalis pedis pulses are 2+ on the right side and 2+ on the left side.        Posterior tibial pulses are 2+ on the right side and 2+ on the left side.      Heart sounds: Normal heart sounds, S1 normal and S2 normal. No murmur heard.  Pulmonary:      Effort: Pulmonary effort is normal. No accessory muscle usage, prolonged expiration or respiratory distress.      Breath sounds: Normal breath sounds.   Chest:      Chest wall: No tenderness.   Abdominal:      General: Bowel sounds are normal. There is no distension.      Palpations: Abdomen is soft. There is no mass.      Tenderness: There is no abdominal tenderness.   Musculoskeletal:         General: Tenderness present.      Right elbow: Tenderness present.      Left elbow: Decreased range of motion. Tenderness present.      Cervical back: Normal range of motion and neck supple. Tenderness present.      Thoracic back: Tenderness present. Decreased range of motion.      Lumbar back: Spasms and tenderness present. Decreased range of motion.      Right knee: Bony tenderness present. Decreased range of motion. Tenderness present.      Left knee: Bony tenderness present. Decreased range of motion. Tenderness present.      Right lower leg: No edema.      Left lower leg: No edema.      Right ankle: Tenderness present. Decreased range of motion. Normal pulse.      Comments: No muscular atrophy or flaccidity.   Lymphadenopathy:      Head:      Right side of head: No submental or submandibular adenopathy.      Left side of head: No submental or submandibular adenopathy.      Cervical: No cervical adenopathy.      Right cervical: No superficial  cervical adenopathy.     Left cervical: No superficial cervical adenopathy.   Skin:     General: Skin is warm and dry.      Capillary Refill: Capillary refill takes less than 2 seconds.      Coloration: Skin is not jaundiced or pale.      Findings: No erythema.      Nails: There is no clubbing.   Neurological:      Mental Status: He is alert and oriented to person, place, and time.      Cranial Nerves: No cranial nerve deficit or facial asymmetry.      Sensory: No sensory deficit.      Motor: No weakness, tremor, atrophy or abnormal muscle tone.      Coordination: Coordination normal.      Gait: Gait abnormal (moderately antalgic).      Deep Tendon Reflexes: Reflexes are normal and symmetric.   Psychiatric:         Attention and Perception: He is attentive.         Mood and Affect: Mood is anxious and depressed. Affect is flat.         Speech: Speech normal.         Behavior: Behavior normal. Behavior is cooperative.         Thought Content: Thought content normal.         Cognition and Memory: Cognition normal.         Judgment: Judgment normal.           Diagnoses and all orders for this visit:    1. Rash and other nonspecific skin eruption (Primary)  Comments:  will provide benadryl for prophylaxis.  patient has had multiple allergic reactions.   Orders:  -     diphenhydrAMINE (Benadryl Allergy) 25 MG tablet; Take 1-2 tablets by mouth Daily As Needed for Itching (or rash).  Dispense: 60 tablet; Refill: 1  -     clobetasol (TEMOVATE) 0.05 % ointment; Apply 1 application topically to the appropriate area as directed 2 (Two) Times a Day.  Dispense: 60 g; Refill: 2    2. Renal stone  Comments:  KUB for rule out   Orders:  -     XR Abdomen KUB    3. Generalized anxiety disorder  Overview:  With insomnia    Assessment & Plan:  Encouraged to be consistent with his Remeron.  Encouraged to be consistent with [facility] For therapy sessions      4. Multiple joint pain  Comments:  Continue to be active.  Encouraged to take  Robaxin twice daily for muscle pain.    5. Essential hypertension  Assessment & Plan:  Continue lisinopril 30 mg.  Continue under the care of cardiology.  Ambulatory BP monitoring either at home or random community checks.  Patient to report continued elevations >140/90.  Patient may come by office for checks if needed.       6. Gastroesophageal reflux disease without esophagitis  Assessment & Plan:  Continue under the care of GI.  Continue Dexilant as directed.  Avoid foods that trigger GI symptoms         Class 2 Severe Obesity (BMI >=35 and <=39.9). Obesity-related health conditions include the following: hypertension, dyslipidemias, GERD and osteoarthritis. Obesity is unchanged. BMI is Is above average. We discussed portion control and increasing exercise.     Understands disease processes and need for medications.  Understands reasons for urgent and emergent care.  Patient (& family) verbalized agreement for treatment plan.   Emotional support and active listening provided.  Patient provided time to verbalize feelings.    RTC 1 month, sooner if needed for problems or concerns          This document has been electronically signed by:  BALDOMERO Thao, YESY-C    Dragon disclaimer:  Part of this note may be an electronic transcription/translation of spoken language to printed text using the Dragon Dictation System.

## 2022-05-19 ENCOUNTER — OFFICE VISIT (OUTPATIENT)
Dept: UROLOGY | Facility: CLINIC | Age: 52
End: 2022-05-19

## 2022-05-19 VITALS — BODY MASS INDEX: 37.37 KG/M2 | HEIGHT: 67 IN | WEIGHT: 238.1 LBS

## 2022-05-19 DIAGNOSIS — M54.50 ACUTE BILATERAL LOW BACK PAIN WITHOUT SCIATICA: ICD-10-CM

## 2022-05-19 DIAGNOSIS — N20.0 RENAL CALCULUS: Primary | ICD-10-CM

## 2022-05-19 DIAGNOSIS — N40.0 BPH WITHOUT OBSTRUCTION/LOWER URINARY TRACT SYMPTOMS: ICD-10-CM

## 2022-05-19 DIAGNOSIS — R10.9 BILATERAL FLANK PAIN: ICD-10-CM

## 2022-05-19 LAB
BILIRUB BLD-MCNC: NEGATIVE MG/DL
CLARITY, POC: CLEAR
COLOR UR: YELLOW
EXPIRATION DATE: ABNORMAL
GLUCOSE UR STRIP-MCNC: NEGATIVE MG/DL
KETONES UR QL: NEGATIVE
LEUKOCYTE EST, POC: NEGATIVE
Lab: ABNORMAL
NITRITE UR-MCNC: NEGATIVE MG/ML
PH UR: 6.5 [PH] (ref 5–8)
PROT UR STRIP-MCNC: NEGATIVE MG/DL
RBC # UR STRIP: NEGATIVE /UL
SP GR UR: 1.01 (ref 1–1.03)
UROBILINOGEN UR QL: NORMAL

## 2022-05-19 PROCEDURE — 99214 OFFICE O/P EST MOD 30 MIN: CPT | Performed by: NURSE PRACTITIONER

## 2022-05-19 RX ORDER — DICLOFENAC SODIUM 30 MG/G
GEL TOPICAL 2 TIMES DAILY PRN
Qty: 100 G | Refills: 2 | Status: SHIPPED | OUTPATIENT
Start: 2022-05-19 | End: 2022-07-18

## 2022-05-19 NOTE — PROGRESS NOTES
"Chief Complaint  Flank Pain (Follow up right flank pain/right renal stones -RESOLVED)    Subjective          Jaquan Mckay presents to [facility]  History of Present Illness    Mr. Jaquan Mckay is a pleasant 51-year-old male, a poor historian, with a significant history of BPH with incomplete bladder emptying, right renal stones, flank pain, who returns to clinic today for evaluation.  This is his 3-month follow-up. Patient reports significant concerns with right greater than left flank pain, and abdominal discomfort that has remained very bothersome to him.    Patient has a significant history of BPH with intermittent voiding dysfunction much improved since starting his Flomax. His most recent PSA was 1.0 on July 1 of 2021, his PSA 2 years ago was 0.8.  He denies any issues with urine frequency, urgency, dysuria, he denies nocturia, he denies any burning on urination.  He has significant pelvic pressure with suprapubic discomfort, bilateral flank pain, lower back pain, but denies any CVA tenderness.  His urine dipstick today is completely negative for any infection, it is negative for gross/microscopic hematuria.  His PVR is 0 cc, much improved from his 210 cc prior, his IPSS score is 8.    We both reviewed/discussed his repeat KUB done today 05/18/2022 which is unremarkable. No calcifications were demonstratable on the current exam overlying the kidneys or along the course of the ureters. The bowel gas pattern was  unremarkable. No soft tissue masses were noted. There are clips in right  upper quadrant from previous cholecystectomy.     IMPRESSION:  A kidney stone is not demonstratable on the KUB at this time.      Objective   Vital Signs:  Ht 170.2 cm (67.01\")   Wt 108 kg (238 lb 1.6 oz)   BMI 37.28 kg/m²   Class 2 Severe Obesity (BMI >=35 and <=39.9). Obesity-related health conditions include the following: obstructive sleep apnea, hypertension, diabetes mellitus, " dyslipidemias and GERD. Obesity is improving with lifestyle modifications. BMI is 37.28. We discussed portion control and increasing exercise.      Physical Exam  Constitutional:       General: He is in acute distress.      Appearance: He is well-developed. He is obese.   HENT:      Head: Normocephalic and atraumatic.      Right Ear: External ear normal.      Left Ear: External ear normal.   Eyes:      General:         Right eye: No discharge.         Left eye: No discharge.      Conjunctiva/sclera: Conjunctivae normal.      Pupils: Pupils are equal, round, and reactive to light.   Neck:      Thyroid: No thyromegaly.      Trachea: No tracheal deviation.   Cardiovascular:      Rate and Rhythm: Normal rate and regular rhythm.      Heart sounds: No murmur heard.    No friction rub.   Pulmonary:      Effort: Pulmonary effort is normal. No respiratory distress.      Breath sounds: Normal breath sounds. No stridor.   Abdominal:      General: Bowel sounds are normal. There is distension.      Palpations: Abdomen is soft.      Tenderness: There is abdominal tenderness. There is no guarding.   Genitourinary:     Penis: Normal and uncircumcised. No tenderness or discharge.       Testes: Normal.      Rectum: Normal. Guaiac result negative.   Musculoskeletal:         General: Tenderness present. No deformity. Normal range of motion.      Cervical back: Normal range of motion and neck supple.   Skin:     General: Skin is warm and dry.      Capillary Refill: Capillary refill takes less than 2 seconds.      Coloration: Skin is pale.   Neurological:      Mental Status: He is alert and oriented to person, place, and time.      Cranial Nerves: No cranial nerve deficit.      Coordination: Coordination normal.   Psychiatric:         Behavior: Behavior normal.         Thought Content: Thought content normal.         Judgment: Judgment normal.        Result Review :     UA    Urinalysis 1/28/22 4/20/22 5/19/22   Ketones, UA Negative  Negative Negative   Leukocytes, UA Negative Negative Negative           Urine Culture    Urine Culture 7/21/21 1/28/22   Urine Culture Final report Final report           PSA    PSA 7/1/21   PSA 1.0      Comments are available for some flowsheets but are not being displayed.           Data reviewed: Radiologic studies KUB done today 05/18, no kidney stones noted on KUB          Assessment and Plan    Diagnoses and all orders for this visit:    1. Renal calculus (Primary)  -     POC Urinalysis Dipstick, Automated    2. Acute bilateral low back pain without sciatica  -     Diclofenac Sodium 3 % gel gel; Apply  topically to the appropriate area as directed 2 (Two) Times a Day As Needed (lower back pain) for up to 60 days. for 60 days.  Dispense: 100 g; Refill: 2    3. Bilateral flank pain  -     Diclofenac Sodium 3 % gel gel; Apply  topically to the appropriate area as directed 2 (Two) Times a Day As Needed (lower back pain) for up to 60 days. for 60 days.  Dispense: 100 g; Refill: 2    4. BPH without obstruction/lower urinary tract symptoms  -     PSA DIAGNOSTIC                                       ASSESSMENT  RIGHT>LEFT FLANK PAIN/BPH /HX OF KIDNEY STONES-RESOLVED  Pleasant patient with extensive polypharmacy, and significant past medical history, consistent with back pain, kidney stones, constipation, returns to clinic today for evaluation.  Reports increased right flank pain, lower back pain and abdominal pain.  We both reviewed and discussed his KUB results today which is negative for stones. His urine dipstick is completely negative for any infection it is negative for gross hematuria.     BPH: WE Discussed the pathophysiology of BPH and obstruction. We discussed the static and dynamic effects of BPH as well as using 5 alpha reductase inhibitors versus alpha blockade.  We discussed the indications for transurethral surgery as well.  And/ or other therapeutic options available including all of the newer  techniques.  He has no real symptomatology referable to his prostate other than moderate enlargement.  Rather than proceed with an expensive workup he doesn't need, at this time I am recommending he continue with an alpha blocker tamsulosin 0.4 mg capsule daily.  .                                                PLAN  Also reviewed his prior KUB which is also negative for stones.  We further discussed a kidney stone prevention diet to include increasing p.o. fluid intake, to at least 1 to 2 L of water daily.  He is to avoid caffeine products such as cola, coffee, and to avoid soft or soda drinks.     We also discussed the importance of decreasing his  sodium consumption as in  Fast foods, denny, salted nuts, canned foods, and smoked/cured foods.  He is also to decrease his oxalate consumption, as in spinach, Adrian greens, and Rhubarb.  Also important is to decrease protein intake, as in red meats, peanut butter, and also avoid nuts.     We discussed treatment options for  flank pain with patient encouraged to continue conservative therapy. Rest is the most important treatment in the case of flank pain. Rest and physical therapy are enough to improve minor pain.       Discussed to monitor his daily routine with prevention of flank pain by: At least Drinking 8 glasses of water per day, Limiting your alcohol consumption.  Having a healthy diet containing fruits, vegetables, and a lot of fluids, Practicing safe sex.  Also maintaining proper hygiene of your body as well as the environment.     He is to continue his daily bowel regiment as recommended by  GI     Follow-up in clinic as discussed, may return sooner if worsening.     Patient is agreeable plan of care.     Patient reports that he is not currently experiencing any symptoms of urinary incontinence.     Patient's Body mass index is 36.01 kg/m². indicating that he is obese (BMI >30). Obesity-related health conditions include the following: hypertension,  dyslipidemias and GERD. Obesity is improving with lifestyle modifications. BMI is 36.01. We discussed portion control and increasing exercise..     Smoking Cessation Counseling:  Current every day smoker. less than 3 minutes spent counseling. Will try to cut down.  I advised patient to quit tobacco use and offered support.  I provided patient with tobacco cessation educational material printed in the patient's After Visit Summary.      Follow Up   Return in about 6 months (around 11/19/2022) for Next scheduled follow up FOR BPH WITH GERARDO/PSA PRIOR.  Patient was given instructions and counseling regarding his condition or for health maintenance advice. Please see specific information pulled into the AVS if appropriate.

## 2022-05-20 NOTE — PATIENT INSTRUCTIONS
"Discussed a kidney stone prevention diet to include increasing p.o. fluid intake, to at least 1 to 2 L of water daily.  She is to avoid caffeine products such as cola, coffee, and to avoid soft or soda drinks.  She is to decrease her sodium consumption as in  Fast foods, denny, salted nuts, canned foods, and smoked/cured foods. She is also to decrease her oxalate consumption, as in spinach, Adrian greens, and Rhubarb.  Also important is to decrease protein intake, as in red meats, peanut butter, and also avoid nuts. Textbook of Natural Medicine (5th ed., pp. 2969-8687.e3). [place], [place]: [employer].\">   Dietary Guidelines to Help Prevent Kidney Stones  Kidney stones are deposits of minerals and salts that form inside your kidneys. Your risk of developing kidney stones may be greater depending on your diet, your lifestyle, the medicines you take, and whether you have certain medical conditions. Most people can lower their chances of developing kidney stones by following the instructions below. Your dietitian may give you more specific instructions depending on your overall health and the type of kidney stones you tend to develop.  What are tips for following this plan?  Reading food labels    Choose foods with \"no salt added\" or \"low-salt\" labels. Limit your salt (sodium) intake to less than 1,500 mg a day.  Choose foods with calcium for each meal and snack. Try to eat about 300 mg of calcium at each meal. Foods that contain 200-500 mg of calcium a serving include:  8 oz (237 mL) of milk, calcium-fortifiednon-dairy milk, and calcium-fortifiedfruit juice. Calcium-fortified means that calcium has been added to these drinks.  8 oz (237 mL) of kefir, yogurt, and soy yogurt.  4 oz (114 g) of tofu.  1 oz (28 g) of cheese.  1 cup (150 g) of dried figs.  1 cup (91 g) of cooked broccoli.  One 3 oz (85 g) can of sardines or mackerel.  Most people need 1,000-1,500 mg of calcium a day. Talk to your dietitian about how much " calcium is recommended for you.  Shopping  Buy plenty of fresh fruits and vegetables. Most people do not need to avoid fruits and vegetables, even if these foods contain nutrients that may contribute to kidney stones.  When shopping for convenience foods, choose:  Whole pieces of fruit.  Pre-made salads with dressing on the side.  Low-fat fruit and yogurt smoothies.  Avoid buying frozen meals or prepared deli foods. These can be high in sodium.  Look for foods with live cultures, such as yogurt and kefir.  Choose high-fiber grains, such as whole-wheat breads, oat bran, and wheat cereals.  Cooking  Do not add salt to food when cooking. Place a salt shaker on the table and allow each person to add his or her own salt to taste.  Use vegetable protein, such as beans, textured vegetable protein (TVP), or tofu, instead of meat in pasta, casseroles, and soups.  Meal planning  Eat less salt, if told by your dietitian. To do this:  Avoid eating processed or pre-made food.  Avoid eating fast food.  Eat less animal protein, including cheese, meat, poultry, or fish, if told by your dietitian. To do this:  Limit the number of times you have meat, poultry, fish, or cheese each week. Eat a diet free of meat at least 2 days a week.  Eat only one serving each day of meat, poultry, fish, or seafood.  When you prepare animal protein, cut pieces into small portion sizes. For most meat and fish, one serving is about the size of the palm of your hand.  Eat at least five servings of fresh fruits and vegetables each day. To do this:  Keep fruits and vegetables on hand for snacks.  Eat one piece of fruit or a handful of berries with breakfast.  Have a salad and fruit at lunch.  Have two kinds of vegetables at dinner.  Limit foods that are high in a substance called oxalate. These include:  Spinach (cooked), rhubarb, beets, sweet potatoes, and Swiss chard.  Peanuts.  Potato chips, french fries, and baked potatoes with skin on.  Nuts and nut  products.  Chocolate.  If you regularly take a diuretic medicine, make sure to eat at least 1 or 2 servings of fruits or vegetables that are high in potassium each day. These include:  Avocado.  Banana.  Orange, prune, carrot, or tomato juice.  Baked potato.  Cabbage.  Beans and split peas.  Lifestyle    Drink enough fluid to keep your urine pale yellow. This is the most important thing you can do. Spread your fluid intake throughout the day.  If you drink alcohol:  Limit how much you use to:  0-1 drink a day for women who are not pregnant.  0-2 drinks a day for men.  Be aware of how much alcohol is in your drink. In the [place] one drink equals one 12 oz bottle of beer (355 mL), one 5 oz glass of wine (148 mL), or one 1½ oz glass of hard liquor (44 mL).  Lose weight if told by your health care provider. Work with your dietitian to find an eating plan and weight loss strategies that work best for you.    General information  Talk to your health care provider and dietitian about taking daily supplements. You may be told the following depending on your health and the cause of your kidney stones:  Not to take supplements with vitamin C.  To take a calcium supplement.  To take a daily probiotic supplement.  To take other supplements such as magnesium, fish oil, or vitamin B6.  Take over-the-counter and prescription medicines only as told by your health care provider. These include supplements.  What foods should I limit?  Limit your intake of the following foods, or eat them as told by your dietitian.  Vegetables  Spinach. Rhubarb. Beets. Canned vegetables. Pickles. Olives. Baked potatoes with skin.  Grains  Wheat bran. Baked goods. Salted crackers. Cereals high in sugar.  Meats and other proteins  Nuts. Nut butters. Large portions of meat, poultry, or fish. Salted, precooked, or cured meats, such as sausages, meat loaves, and hot dogs.  Dairy  Cheese.  Beverages  Regular soft drinks. Regular vegetable juice.  Seasonings  and condiments  Seasoning blends with salt. Salad dressings. Soy sauce. Ketchup. Barbecue sauce.  Other foods  Canned soups. Canned pasta sauce. Casseroles. Pizza. Lasagna. Frozen meals. Potato chips. French fries.  The items listed above may not be a complete list of foods and beverages you should limit. Contact a dietitian for more information.  What foods should I avoid?  Talk to your dietitian about specific foods you should avoid based on the type of kidney stones you have and your overall health.  Fruits  Grapefruit.  The item listed above may not be a complete list of foods and beverages you should avoid. Contact a dietitian for more information.  Summary  Kidney stones are deposits of minerals and salts that form inside your kidneys.  You can lower your risk of kidney stones by making changes to your diet.  The most important thing you can do is drink enough fluid. Drink enough fluid to keep your urine pale yellow.  Talk to your dietitian about how much calcium you should have each day, and eat less salt and animal protein as told by your dietitian.  This information is not intended to replace advice given to you by your health care provider. Make sure you discuss any questions you have with your health care provider.  Document Revised: 12/10/2020 Document Reviewed: 12/10/2020  [employer] Patient Education © 2021 [employer]

## 2022-05-23 ENCOUNTER — HOSPITAL ENCOUNTER (OUTPATIENT)
Dept: GENERAL RADIOLOGY | Facility: HOSPITAL | Age: 52
Discharge: HOME | End: 2022-05-23

## 2022-05-23 ENCOUNTER — TELEPHONE (OUTPATIENT)
Dept: FAMILY MEDICINE CLINIC | Facility: CLINIC | Age: 52
End: 2022-05-23

## 2022-05-23 DIAGNOSIS — M54.9 MECHANICAL BACK PAIN: Primary | ICD-10-CM

## 2022-05-23 PROCEDURE — 72072 X-RAY EXAM THORAC SPINE 3VWS: CPT | Performed by: RADIOLOGY

## 2022-05-23 PROCEDURE — 72072 X-RAY EXAM THORAC SPINE 3VWS: CPT

## 2022-05-23 PROCEDURE — 72040 X-RAY EXAM NECK SPINE 2-3 VW: CPT

## 2022-05-23 PROCEDURE — 72040 X-RAY EXAM NECK SPINE 2-3 VW: CPT | Performed by: RADIOLOGY

## 2022-05-23 PROCEDURE — 72100 X-RAY EXAM L-S SPINE 2/3 VWS: CPT

## 2022-05-23 PROCEDURE — 72100 X-RAY EXAM L-S SPINE 2/3 VWS: CPT | Performed by: RADIOLOGY

## 2022-05-23 NOTE — TELEPHONE ENCOUNTER
----- Message from BALDOMERO Thao sent at 5/23/2022  2:59 PM EDT -----  Ordered    ----- Message -----  From: Gissell Isidro MA  Sent: 5/23/2022  11:10 AM EDT  To: BALDOMERO Thao    Yes    ----- Message -----  From: Tiera Valenzuela APRN  Sent: 5/23/2022  11:00 AM EDT  To: Gissell Isidro MA    Does he want to start with xrays?  ----- Message -----  From: Gissell Isidro MA  Sent: 5/23/2022   9:17 AM EDT  To: BALDOMERO Thao    Patient calling, states that he is having back pain. He states that he was told at his urology appt that he needed imaging of his back and to ask PCP to order.     Please advise.

## 2022-05-29 PROBLEM — E66.812 CLASS 2 SEVERE OBESITY DUE TO EXCESS CALORIES WITH SERIOUS COMORBIDITY AND BODY MASS INDEX (BMI) OF 37.0 TO 37.9 IN ADULT: Status: ACTIVE | Noted: 2019-07-16

## 2022-05-29 PROBLEM — E66.01 CLASS 2 SEVERE OBESITY DUE TO EXCESS CALORIES WITH SERIOUS COMORBIDITY AND BODY MASS INDEX (BMI) OF 37.0 TO 37.9 IN ADULT: Status: ACTIVE | Noted: 2019-07-16

## 2022-05-29 NOTE — ASSESSMENT & PLAN NOTE
Encouraged to be consistent with his Remeron.  Encouraged to be consistent with [facility] For therapy sessions

## 2022-05-29 NOTE — ASSESSMENT & PLAN NOTE
Continue under the care of GI.  Continue Dexilant as directed.  Avoid foods that trigger GI symptoms

## 2022-06-20 ENCOUNTER — OFFICE VISIT (OUTPATIENT)
Dept: FAMILY MEDICINE CLINIC | Facility: CLINIC | Age: 52
End: 2022-06-20

## 2022-06-20 VITALS
OXYGEN SATURATION: 98 % | WEIGHT: 239.6 LBS | HEART RATE: 92 BPM | TEMPERATURE: 97.4 F | HEIGHT: 67 IN | BODY MASS INDEX: 37.61 KG/M2 | SYSTOLIC BLOOD PRESSURE: 120 MMHG | DIASTOLIC BLOOD PRESSURE: 70 MMHG

## 2022-06-20 DIAGNOSIS — F51.04 PSYCHOPHYSIOLOGICAL INSOMNIA: ICD-10-CM

## 2022-06-20 DIAGNOSIS — J32.1 CHRONIC FRONTAL SINUSITIS: ICD-10-CM

## 2022-06-20 DIAGNOSIS — K21.9 GASTROESOPHAGEAL REFLUX DISEASE WITHOUT ESOPHAGITIS: ICD-10-CM

## 2022-06-20 DIAGNOSIS — I10 ESSENTIAL HYPERTENSION: ICD-10-CM

## 2022-06-20 DIAGNOSIS — R06.01 ORTHOPNEA: ICD-10-CM

## 2022-06-20 DIAGNOSIS — G44.52 NEW DAILY PERSISTENT HEADACHE: Primary | ICD-10-CM

## 2022-06-20 DIAGNOSIS — Z87.898 HISTORY OF BRAIN TUMOR: ICD-10-CM

## 2022-06-20 PROCEDURE — 99214 OFFICE O/P EST MOD 30 MIN: CPT | Performed by: NURSE PRACTITIONER

## 2022-06-20 RX ORDER — LORATADINE 10 MG/1
TABLET ORAL
Qty: 30 TABLET | Refills: 5 | Status: SHIPPED | OUTPATIENT
Start: 2022-06-20

## 2022-06-20 RX ORDER — POTASSIUM CHLORIDE 750 MG/1
TABLET, EXTENDED RELEASE ORAL
Qty: 30 TABLET | Refills: 5 | Status: SHIPPED | OUTPATIENT
Start: 2022-06-20 | End: 2022-10-10

## 2022-06-20 RX ORDER — TEMAZEPAM 15 MG/1
15 CAPSULE ORAL NIGHTLY PRN
Qty: 30 CAPSULE | Refills: 2 | Status: SHIPPED | OUTPATIENT
Start: 2022-06-20 | End: 2022-10-10 | Stop reason: SDUPTHER

## 2022-06-20 NOTE — PROGRESS NOTES
"Subjective   Jaquan Mckay is a 51 y.o. male.     Chief Complaint   Patient presents with   • Hypertension       History of Present Illness     Hypertension-chronic and ongoing.  Patient is under the care of cardiology.  He is currently taking lisinopril 30 mg.  No negative side effects.  Blood pressure is stable with medications.  Allergy and asthma-under the care of allergy and asthma provider in [facility].  Patient is currently on Nasonex nasal spray, Singulair 10 mg and Astelin nasal spray.  He is also receiving immunotherapy.  Vitamin D deficiency-chronic and ongoing.  Patient is presently taking vitamin D 50,000 units supplement.  No negative side effects of medication are reported.  Vitamin D level is stable with medication use.  GERD-chronic and ongoing.  Patient is currently under the care of GI and is taking Dexilant 60 mg.  No negative side effects.  Symptoms have been controlled with medication use.  Dizziness-for the last 2 nights and is followed by a headache.  He reports that dizziness occurs randomly. He reports that it seemed like the room was \"going fast\".  Lasted approx 1 hour.   He reports the head pain occurred at the area of his \"Scar\" on his right temporal region.  Then is \"a [facility]\".  Feels like 'Head in a vice\".  He is taking allergy injections as directed.  He reports that he could not focus his vision well.  He reports he has taken OTC meds for relief.  He reports sensation of pressure in the back of his head.  No epistaxis.  No sore throat or changes in neck pain.    Sleep study-needs an updated study.  He had one last in the 90's.  He was not able to tolerate machine.  He reports SOA and \"smothering\" when he lies flat.  He is using 3 pillows to sleep.  He does not feel that he is having any significant changes in his health but reports that he just cannot lie flat.  He is not noticing the shortness of breath at any other time even when he continues to be active.  Insomnia-reports " "he has been taking an old RX of tenazepam 15 mg for insomnia.  He would like to have a refill again.  He feels his current dose of Remeron is not helping as much as previous.  Patient has failed multiple other antidepressants and sleep medications due to side effects including rashes and hives as well as inefficacy.    The following portions of the patient's history were reviewed and updated as appropriate: CC, ROS, allergies, current medications, past family history, past medical history, past social history, past surgical history and problem list.    Review of Systems   Constitutional: Negative for activity change, appetite change, fatigue and fever.   HENT: Negative for congestion, ear pain, facial swelling, nosebleeds, rhinorrhea, sinus pressure, sore throat and trouble swallowing.    Eyes: Negative for blurred vision, double vision and redness.   Respiratory: Negative for cough, chest tightness, shortness of breath and wheezing.    Cardiovascular: Negative for chest pain, palpitations and leg swelling.   Gastrointestinal: Negative for abdominal pain, blood in stool, constipation, diarrhea, nausea and vomiting.   Endocrine: Negative.    Genitourinary: Negative for dysuria, flank pain, frequency, hematuria and urgency.   Musculoskeletal: Positive for arthralgias and myalgias.   Skin: Negative.    Allergic/Immunologic: Negative.    Neurological: Positive for dizziness. Negative for weakness, light-headedness and headache.   Hematological: Negative.    Psychiatric/Behavioral: Negative for self-injury, sleep disturbance and suicidal ideas. The patient is not nervous/anxious.    All other systems reviewed and are negative.      Objective     /70   Pulse 92   Temp 97.4 °F (36.3 °C)   Ht 170.2 cm (67.01\")   Wt 109 kg (239 lb 9.6 oz)   SpO2 98%   BMI 37.52 kg/m²     Physical Exam  Vitals reviewed.   Constitutional:       General: He is not in acute distress.     Appearance: He is well-developed. He is not " diaphoretic.   HENT:      Head: Normocephalic and atraumatic.      Jaw: No tenderness.      Comments: Oropharynx not examined.  Patient is presently wearing a face covering/mask due to COVID-19 pandemic.     Right Ear: Hearing, tympanic membrane, ear canal and external ear normal.      Left Ear: Hearing, tympanic membrane, ear canal and external ear normal.   Eyes:      General: Lids are normal. No scleral icterus.     Extraocular Movements:      Right eye: Normal extraocular motion and no nystagmus.      Left eye: Normal extraocular motion and no nystagmus.      Conjunctiva/sclera: Conjunctivae normal.      Pupils: Pupils are equal, round, and reactive to light.   Neck:      Thyroid: No thyromegaly or thyroid tenderness.      Vascular: No carotid bruit or JVD.      Trachea: No tracheal tenderness.   Cardiovascular:      Rate and Rhythm: Normal rate and regular rhythm.      Pulses:           Dorsalis pedis pulses are 2+ on the right side and 2+ on the left side.        Posterior tibial pulses are 2+ on the right side and 2+ on the left side.      Heart sounds: Normal heart sounds, S1 normal and S2 normal. No murmur heard.  Pulmonary:      Effort: Pulmonary effort is normal. No accessory muscle usage, prolonged expiration or respiratory distress.      Breath sounds: Normal breath sounds.   Chest:      Chest wall: No tenderness.   Abdominal:      General: Bowel sounds are normal. There is no distension.      Palpations: Abdomen is soft. There is no mass.      Tenderness: There is no abdominal tenderness.   Musculoskeletal:      Cervical back: Normal range of motion and neck supple. Spasms and tenderness present.      Thoracic back: Spasms and tenderness present.      Lumbar back: Spasms and tenderness present.      Right lower leg: No edema.      Left lower leg: No edema.      Comments: No muscular atrophy or flaccidity.   Lymphadenopathy:      Head:      Right side of head: No submental or submandibular adenopathy.       Left side of head: No submental or submandibular adenopathy.      Cervical: No cervical adenopathy.      Right cervical: No superficial cervical adenopathy.     Left cervical: No superficial cervical adenopathy.   Skin:     General: Skin is warm and dry.      Capillary Refill: Capillary refill takes less than 2 seconds.      Coloration: Skin is not jaundiced or pale.      Findings: No erythema.      Nails: There is no clubbing.   Neurological:      Mental Status: He is alert and oriented to person, place, and time.      Cranial Nerves: No cranial nerve deficit or facial asymmetry.      Sensory: No sensory deficit.      Motor: No weakness, tremor, atrophy or abnormal muscle tone.      Coordination: Coordination normal.      Deep Tendon Reflexes: Reflexes are normal and symmetric.   Psychiatric:         Attention and Perception: He is attentive.         Mood and Affect: Mood normal.         Speech: Speech normal.         Behavior: Behavior normal. Behavior is cooperative.         Thought Content: Thought content normal.         Judgment: Judgment normal.           Diagnoses and all orders for this visit:    1. New daily persistent headache (Primary)  -     CT Head Without Contrast    2. History of brain tumor  Comments:  CT scan due to his new onset of headache  Orders:  -     CT Head Without Contrast    3. Psychophysiological insomnia  Overview:  With depression and anxiety      Orders:  -     temazepam (Restoril) 15 MG capsule; Take 1 capsule by mouth At Night As Needed for Sleep.  Dispense: 30 capsule; Refill: 2    4. Orthopnea  Comments:  Will report symptoms to cardiology as well as advised patient to discuss at his appointment tomorrow.    5. Essential hypertension  Assessment & Plan:  Continue lisinopril 30 mg.  Continue under the care of cardiology.  Ambulatory BP monitoring either at home or random community checks.  Patient to report continued elevations >140/90.  Patient may come by office for checks  if needed.       6. Gastroesophageal reflux disease without esophagitis  Assessment & Plan:  Continue Dexilant as directed.  Continue under the care of GI as directed         Class 2 Severe Obesity (BMI >=35 and <=39.9). Obesity-related health conditions include the following: hypertension, dyslipidemias, GERD and osteoarthritis. Obesity is unchanged. BMI is unchanged . We discussed portion control and increasing exercise.       Understands disease processes and need for medications.  Understands reasons for urgent and emergent care.  Patient (& family) verbalized agreement for treatment plan.   Emotional support and active listening provided.  Patient provided time to verbalize feelings.    CHAVEZ reviewed today and consistent.  Will refill prescribed controlled medication today.  Patient is aware they cannot receive narcotics from any other provider except if under care of pain management or speciality clinic.  Risk and benefits of medication use has been reviewed.  History and physical exam exhibit continued safe and appropriate use of controlled substances.  The patient is aware of the potential for addiction and dependence.  This patient has been made aware of the appropriate use of such medications, including potential risk of somnolence, limited ability to drive and / or work safely, and potential for overdose.  Patient understands not to take medication and drive until they know how medication may affect their cognition/decision making.   It has also been made clear that these medications are for use by this patient only, without concomitant use of alcohol or other substances unless prescribed/advised by medical provider.  Patient understands they may be subject to UDS and pill counts at random.      Patient considered to be low risk for addiction due to use of single controlled medications.  Patient understands and accepts these risks.  Patient need for medication will be reassessed at each visit.  Doses  will be adjusted according to patient need and findings.    Goal of TX: Patient will not have any adverse reactions of medication.  Patient will have improvement in sleep hygiene/pattern with use of Restoril as needed as directed.    RTC 5 to 6 weeks, sooner if needed for problems or concerns          This document has been electronically signed by:  BALDOMERO Thao FNP-C Dragon disclaimer:  Part of this note may be an electronic transcription/translation of spoken language to printed text using the Dragon Dictation System.

## 2022-06-28 ENCOUNTER — APPOINTMENT (OUTPATIENT)
Dept: GENERAL RADIOLOGY | Facility: HOSPITAL | Age: 52
End: 2022-06-28

## 2022-06-28 ENCOUNTER — HOSPITAL ENCOUNTER (EMERGENCY)
Facility: HOSPITAL | Age: 52
Discharge: HOME | End: 2022-06-28
Attending: STUDENT IN AN ORGANIZED HEALTH CARE EDUCATION/TRAINING PROGRAM | Admitting: STUDENT IN AN ORGANIZED HEALTH CARE EDUCATION/TRAINING PROGRAM

## 2022-06-28 VITALS
SYSTOLIC BLOOD PRESSURE: 122 MMHG | TEMPERATURE: 98.3 F | OXYGEN SATURATION: 99 % | RESPIRATION RATE: 18 BRPM | BODY MASS INDEX: 37.67 KG/M2 | HEIGHT: 67 IN | WEIGHT: 240 LBS | HEART RATE: 95 BPM | DIASTOLIC BLOOD PRESSURE: 85 MMHG

## 2022-06-28 DIAGNOSIS — S93.402A SPRAIN OF LEFT ANKLE, UNSPECIFIED LIGAMENT, INITIAL ENCOUNTER: Primary | ICD-10-CM

## 2022-06-28 PROCEDURE — 99283 EMERGENCY DEPT VISIT LOW MDM: CPT

## 2022-06-28 PROCEDURE — 73610 X-RAY EXAM OF ANKLE: CPT

## 2022-06-29 ENCOUNTER — PATIENT OUTREACH (OUTPATIENT)
Dept: CASE MANAGEMENT | Facility: OTHER | Age: 52
End: 2022-06-29

## 2022-06-29 NOTE — OUTREACH NOTE
AMBULATORY CASE MANAGEMENT NOTE    Name and Relationship of Patient/Support Person: Jaquan Mckay R - Self    Patient Outreach    RN-ACM outreach with patient.  Patient had and ED visit at [facility] 06/28/22.  Patient was treated and discharged to home to follow with PCP.  Clinical impression noted as sprain of left ankle, unspecified ligament.  No medication changes noted at discharge.    AVS, education, and recommended f/u reviewed with patient.  Patient v/u and reported continued discomfort in the ankle.  Patient was provided with prefabricated splint,walking and voiced compliance with use.  Patient is interested in a referral to orthopedic provider.  Patient agreeable to PCP f/u to discuss referral.   No other needs/questions/concerns voiced.    Adult Patient Profile  Questions/Answers    Flowsheet Row Most Recent Value   Symptoms/Conditions Managed at Home musculoskeletal   Musculoskeletal Symptoms/Conditions mobility limited   Musculoskeletal Management Strategies medical device, adequate rest          Education Documentation  When to Seek Medical Attention:  unresolved or worsening symptoms, taught by Dell Preston RN at 6/29/2022   Learner: Patient  Readiness: Acceptance  Method: Explanation  Response: Verbalizes Understanding      SDOH questionnaire and Advance Directive general education forwarded to [facility] this session.      Care Coordination    RN-ACM care coordination with [facility].  Appointment with PCP scheduled for 07/05/22 at 11:30 with GREG Araujo.  Patient notified.  Appointment acceptable as scheduled.         DELL MCCOY  Ambulatory Case Management    6/29/2022, 12:41 EDT

## 2022-07-12 ENCOUNTER — HOSPITAL ENCOUNTER (OUTPATIENT)
Dept: CT IMAGING | Facility: HOSPITAL | Age: 52
Discharge: HOME | End: 2022-07-12
Admitting: NURSE PRACTITIONER

## 2022-07-12 PROCEDURE — 70450 CT HEAD/BRAIN W/O DYE: CPT

## 2022-07-12 PROCEDURE — 70450 CT HEAD/BRAIN W/O DYE: CPT | Performed by: RADIOLOGY

## 2022-07-15 ENCOUNTER — OFFICE VISIT (OUTPATIENT)
Dept: GASTROENTEROLOGY | Facility: CLINIC | Age: 52
End: 2022-07-15

## 2022-07-15 VITALS — WEIGHT: 238.4 LBS | HEIGHT: 67 IN | BODY MASS INDEX: 37.42 KG/M2

## 2022-07-15 DIAGNOSIS — R10.84 GENERALIZED ABDOMINAL PAIN: ICD-10-CM

## 2022-07-15 DIAGNOSIS — R14.0 BLOATING: ICD-10-CM

## 2022-07-15 DIAGNOSIS — R19.7 DIARRHEA, UNSPECIFIED TYPE: Primary | ICD-10-CM

## 2022-07-15 PROCEDURE — 99213 OFFICE O/P EST LOW 20 MIN: CPT | Performed by: PHYSICIAN ASSISTANT

## 2022-07-15 RX ORDER — MONTELUKAST SODIUM 4 MG/1
1 TABLET, CHEWABLE ORAL 2 TIMES DAILY
Qty: 60 TABLET | Refills: 11 | Status: SHIPPED | OUTPATIENT
Start: 2022-07-15 | End: 2022-08-03

## 2022-07-15 NOTE — PROGRESS NOTES
Chief Complaint   Patient presents with   • Abdominal Pain       Jaquan Mckay is a 51 y.o. male who presents to the office today for evaluation of Abdominal Pain  .    HPI  Patient presents the clinic today for follow-up of abdominal pain and diarrhea.  Patient states of the last couple months his diarrhea has been worsening.  He states that he is experiencing diarrhea that is type VII on the Corson stool scale almost daily- these episodes of diarrhea occur directly after eating or drinking anything.  He also experiences cramping and urgency with these bowel movements.  He has not tried taking any over-the-counter remedies for the diarrhea.  He has a lot of abdominal bloating associated.  He has previously been taking Bentyl 10 mg up to 4 times daily without any relief.  He does have his gallbladder removed.    Review of Systems   Constitutional: Negative.    HENT: Negative for sore throat and trouble swallowing.    Eyes: Negative.    Respiratory: Negative for chest tightness.    Cardiovascular: Negative for chest pain.   Gastrointestinal: Positive for abdominal distention, abdominal pain and diarrhea. Negative for anal bleeding, blood in stool, constipation, nausea, rectal pain and vomiting.   Endocrine: Negative.    Genitourinary: Negative for difficulty urinating.   Musculoskeletal: Positive for back pain.   Skin: Negative.    Allergic/Immunologic: Positive for environmental allergies and food allergies.   Neurological: Positive for dizziness and headaches.   Hematological: Bruises/bleeds easily.   Psychiatric/Behavioral: Positive for sleep disturbance. The patient is nervous/anxious.        ACTIVE PROBLEMS:   Specialty Problems        Gastroenterology Problems    Gastroesophageal reflux disease without esophagitis        Rectal bleeding        Therapeutic opioid-induced constipation (OIC)        Chronic idiopathic constipation              PAST MEDICAL HISTORY:  Past Medical History:   Diagnosis Date   •  "Allergic    • Anxiety    • Arthritis    • Asthma    • Body piercing     REPORTS CYLICONE IN EARS   • Clotting disorder (HCC) 2004    had a knee surgery   • Coronary artery disease    • Depression    • DVT (deep venous thrombosis) (HCC)     RIGHT RIGHT KNEE AFTER SURGERY YEARS AGO IN 2001 OR 2004   • Elevated cholesterol    • Gastric ulcer    • GERD (gastroesophageal reflux disease)    • H/O migraine    • Headache    • Heart attack (HCC)     REPORTS \"LIGHT HEART ATTACK A LONG TIME AGO\"  \"EARLY 90'S\"   • History of seizures     REPORTS LAST EPISODE WAS AROUND 1995.   • Hostility    • Hyperlipidemia    • Hypertension    • Knee pain, acute     Left   • Low back pain    • Lyme disease    • Migraine    • MRSA (methicillin resistant Staphylococcus aureus)     REPORTS LAST TESTED + 2004. WAS TREATED HE REPORTS.  RIGHT ARM, RIGHT KNEE.   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    • Obesity    • Poor historian    • Carl [place] spotted fever    • Seizures (HCC)    • Sleep apnea    • Tattoo    • Wears glasses        SURGICAL HISTORY:  Past Surgical History:   Procedure Laterality Date   • ABDOMINAL SURGERY     • BACK SURGERY     • BRAIN SURGERY  1986    Tumor removal    • CARDIAC CATHETERIZATION N/A 9/28/2018    Procedure: Left Heart Cath;  Surgeon: Leandro Daily MD;  Location: [facility] CATH INVASIVE LOCATION;  Service: Cardiology   • CHOLECYSTECTOMY     • COLONOSCOPY     • COLONOSCOPY N/A 8/2/2021    Procedure: COLONOSCOPY FOR SCREENING;  Surgeon: Irving Azar MD;  Location: [facility];  Service: Gastroenterology;  Laterality: N/A;   • CYST REMOVAL      pilonidal cyst   • ELBOW EPICONDYLECTOMY Right 7/23/2020    Procedure: LATERAL EPICONDYLAR RELEASE;  Surgeon: Jose Ruiz MD;  Location: [facility] OR;  Service: Orthopedics;  Laterality: Right;   • ENDOSCOPY     • ENDOSCOPY N/A 8/2/2021    Procedure: ESOPHAGOGASTRODUODENOSCOPY WITH BIOPSY;  Surgeon: Irving Azar MD;  Location: [facility] OR;  Service: " Gastroenterology;  Laterality: N/A;  esophageal dilatation to 20mm   • FRACTURE SURGERY Right     elbow   • KNEE ARTHROSCOPY Left 10/20/2017    Procedure: Diagnostic arthroscopy left knee with chondroplasty;  Surgeon: Marco Aguirre MD;  Location: [facility] OR;  Service:    • KNEE ARTHROSCOPY Left 1/11/2021    Procedure: KNEE DIAGNOSTIC ARTHROSCOPY WITH  CHONDROPLASTY patella, femoral and medial;  Surgeon: Raul Eagle MD;  Location: [facility] OR;  Service: Orthopedics;  Laterality: Left;   • KNEE SURGERY Right    • MOUTH SURGERY      FULL MOUTH EXTRACTION   • OTHER SURGICAL HISTORY      REPORTS 7 TICKS REMOVED FROM RIGHT ARM IN 2001 OR 2002   • TENNIS ELBOW RELEASE Right 7/23/2020    Procedure: RIGHT TENNIS ELBOW RELEASE;  Surgeon: Jose Ruiz MD;  Location: [facility] OR;  Service: Orthopedics;  Laterality: Right;   • TUMOR EXCISION      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       FAMILY HISTORY:  Family History   Problem Relation Age of Onset   • Diabetes Mother    • Hypertension Mother    • Stroke Mother    • Diabetes Father    • Skin cancer Father    • Hypertension Father    • Heart attack Father    • Diabetes Brother    • Hypertension Brother    • Heart disease Maternal Aunt    • Heart disease Maternal Uncle    • Heart disease Paternal Aunt    • Heart disease Paternal Uncle    • Heart disease Maternal Grandmother    • Heart disease Maternal Grandfather    • Heart disease Paternal Grandmother    • Heart disease Paternal Grandfather        SOCIAL HISTORY:  Social History     Tobacco Use   • Smoking status: Current Some Day Smoker     Packs/day: 0.25     Years: 17.00     Pack years: 4.25     Types: Cigars, Cigarettes     Start date: 4/11/2022   • Smokeless tobacco: Never Used   Substance Use Topics   • Alcohol use: No       CURRENT MEDICATION:    Current Outpatient Medications:   •  albuterol sulfate  (90 Base) MCG/ACT inhaler, , Disp: , Rfl:   •  aspirin (aspirin) 81 MG EC tablet, Take 1  tablet by mouth Daily., Disp: 30 tablet, Rfl: 5  •  azelastine (ASTELIN) 0.1 % nasal spray, , Disp: , Rfl:   •  clobetasol (TEMOVATE) 0.05 % ointment, Apply 1 application topically to the appropriate area as directed 2 (Two) Times a Day., Disp: 60 g, Rfl: 2  •  dexlansoprazole (Dexilant) 60 MG capsule, Take 1 capsule by mouth Daily., Disp: 30 capsule, Rfl: 5  •  dicyclomine (Bentyl) 10 MG capsule, Take 1 capsule by mouth 4 (Four) Times a Day Before Meals & at Bedtime., Disp: 120 capsule, Rfl: 11  •  Dilantin 100 MG capsule, Take 2 capsules by mouth 2 (Two) Times a Day., Disp: 120 capsule, Rfl: 5  •  diphenhydrAMINE (Benadryl Allergy) 25 MG tablet, Take 1-2 tablets by mouth Daily As Needed for Itching (or rash)., Disp: 60 tablet, Rfl: 1  •  Elastic Bandages & Supports (ACE Ankle Brace) misc, 1 each Daily., Disp: 1 each, Rfl: 0  •  famotidine (Pepcid) 40 MG tablet, Take 1 tablet by mouth 2 (Two) Times a Day., Disp: 60 tablet, Rfl: 5  •  Flovent HFA 44 MCG/ACT inhaler, , Disp: , Rfl:   •  fluticasone (FLONASE) 50 MCG/ACT nasal spray, , Disp: , Rfl:   •  HYDROcodone-acetaminophen (NORCO) 5-325 MG per tablet, Take 1 tablet by mouth Every 8 (Eight) Hours As Needed for Moderate Pain ., Disp: 9 tablet, Rfl: 0  •  ketorolac (TORADOL) 10 MG tablet, Take 1 tablet by mouth Every 6 (Six) Hours As Needed for Moderate Pain ., Disp: 16 tablet, Rfl: 0  •  lisinopril (PRINIVIL,ZESTRIL) 30 MG tablet, Take 1 tablet by mouth Daily., Disp: 30 tablet, Rfl: 5  •  loratadine (CLARITIN) 10 MG tablet, TAKE 1 TABLET BY MOUTH DAILY AS NEEDED FOR ALLERGIES., Disp: 30 tablet, Rfl: 5  •  magnesium citrate 1.745 GM/30ML solution solution, Take 296 mL by mouth Take As Directed for 2 doses. Take one full bottle at 4:00 PM and take second bottle at 10:00 PM., Disp: 592 mL, Rfl: 0  •  methocarbamol (ROBAXIN) 750 MG tablet, TAKE 1 TABLET BY MOUTH 2 (TWO) TIMES A DAY AS NEEDED FOR MUSCLE SPASMS., Disp: 60 tablet, Rfl: 5  •  mirtazapine (REMERON) 30 MG  "tablet, Take 1.5 tablets by mouth Every Night., Disp: 45 tablet, Rfl: 5  •  mometasone (NASONEX) 50 MCG/ACT nasal spray, , Disp: , Rfl:   •  montelukast (SINGULAIR) 10 MG tablet, Take 10 mg by mouth., Disp: , Rfl:   •  mupirocin (BACTROBAN) 2 % ointment, Apply  topically to the appropriate area as directed 3 (Three) Times a Day., Disp: 30 g, Rfl: 2  •  potassium chloride (K-DUR,KLOR-CON) 10 MEQ CR tablet, TAKE 1 TABLET BY MOUTH DAILY., Disp: 30 tablet, Rfl: 5  •  tamsulosin (FLOMAX) 0.4 MG capsule 24 hr capsule, TAKE 1 CAPSULE BY MOUTH DAILY., Disp: 30 capsule, Rfl: 5  •  temazepam (Restoril) 15 MG capsule, Take 1 capsule by mouth At Night As Needed for Sleep., Disp: 30 capsule, Rfl: 2  •  vitamin D (ERGOCALCIFEROL) 1.25 MG (31542 UT) capsule capsule, Take 1 capsule by mouth Every 7 (Seven) Days., Disp: 4 capsule, Rfl: 05  •  colestipol (COLESTID) 1 g tablet, Take 1 tablet by mouth 2 (Two) Times a Day., Disp: 60 tablet, Rfl: 11    ALLERGIES:  Ciprofloxacin, Miralax [polyethylene glycol], Mobic [meloxicam], Paxil [paroxetine hcl], Peanut-containing drug products, Penicillins, Pristiq [desvenlafaxine succinate er], Sulfa antibiotics, Doxycycline, Fish-derived products, Isosorbide nitrate, Movantik [naloxegol], Seroquel [quetiapine], Buspar [buspirone], Clarithromycin, Clindamycin/lincomycin, Codeine, Contrast dye, Diltiazem, Flomax [tamsulosin], Gabapentin, Keflex [cephalexin], Linzess [linaclotide], Metoprolol, Prednisone, Robitussin cough+ chest max st [dextromethorphan-guaifenesin], Shrimp (diagnostic), Spironolactone, Viibryd [vilazodone hcl], and Zoloft [sertraline hcl]    VISIT VITALS:  Ht 170.2 cm (67\")   Wt 108 kg (238 lb 6.4 oz)   BMI 37.34 kg/m²   Physical Exam  Constitutional:       General: He is not in acute distress.     Appearance: Normal appearance. He is well-developed.   HENT:      Head: Normocephalic and atraumatic.   Eyes:      Pupils: Pupils are equal, round, and reactive to light. "   Cardiovascular:      Rate and Rhythm: Normal rate and regular rhythm.      Heart sounds: Normal heart sounds.   Pulmonary:      Effort: Pulmonary effort is normal. No respiratory distress.      Breath sounds: Normal breath sounds. No wheezing, rhonchi or rales.   Abdominal:      General: Abdomen is flat. Bowel sounds are normal. There is no distension.      Palpations: Abdomen is soft. There is no mass.      Tenderness: There is no abdominal tenderness. There is no guarding or rebound.      Hernia: No hernia is present.   Musculoskeletal:         General: No swelling. Normal range of motion.      Cervical back: Normal range of motion and neck supple.      Right lower leg: No edema.      Left lower leg: No edema.   Skin:     General: Skin is warm and dry.   Neurological:      Mental Status: He is alert and oriented to person, place, and time.   Psychiatric:         Attention and Perception: Attention normal.         Mood and Affect: Mood normal.         Speech: Speech normal.         Behavior: Behavior normal. Behavior is cooperative.         Thought Content: Thought content normal.         Assessment    Due to the patient's symptoms-I will go ahead and get him started on Colestid 1 g up to twice daily.  Patient was instructed to take the medication the first few days 1 time daily and then increase up to twice daily if symptoms do not improve.   Diagnosis Plan   1. Diarrhea, unspecified type  colestipol (COLESTID) 1 g tablet   2. Generalized abdominal pain  colestipol (COLESTID) 1 g tablet   3. Bloating  colestipol (COLESTID) 1 g tablet       Return if symptoms worsen or fail to improve.                   This document has been electronically signed by Marisela Luong PA-C  July 20, 2022 07:42 EDT    Part of this note may be an electronic transcription/translation of spoken language to printed text using the Dragon Dictation System.

## 2022-07-21 ENCOUNTER — OFFICE VISIT (OUTPATIENT)
Dept: FAMILY MEDICINE CLINIC | Facility: CLINIC | Age: 52
End: 2022-07-21

## 2022-07-21 VITALS
HEIGHT: 67 IN | DIASTOLIC BLOOD PRESSURE: 82 MMHG | SYSTOLIC BLOOD PRESSURE: 136 MMHG | TEMPERATURE: 98.2 F | RESPIRATION RATE: 18 BRPM | OXYGEN SATURATION: 98 % | HEART RATE: 94 BPM | WEIGHT: 235 LBS | BODY MASS INDEX: 36.88 KG/M2

## 2022-07-21 DIAGNOSIS — E66.01 CLASS 2 SEVERE OBESITY DUE TO EXCESS CALORIES WITH SERIOUS COMORBIDITY AND BODY MASS INDEX (BMI) OF 36.0 TO 36.9 IN ADULT: ICD-10-CM

## 2022-07-21 DIAGNOSIS — J34.89 NASAL SORE: ICD-10-CM

## 2022-07-21 DIAGNOSIS — I10 ESSENTIAL HYPERTENSION: Primary | ICD-10-CM

## 2022-07-21 DIAGNOSIS — G89.29 CHRONIC PAIN OF LEFT ANKLE: ICD-10-CM

## 2022-07-21 DIAGNOSIS — F51.01 PRIMARY INSOMNIA: ICD-10-CM

## 2022-07-21 DIAGNOSIS — G47.39 OTHER SLEEP APNEA: ICD-10-CM

## 2022-07-21 DIAGNOSIS — M25.572 CHRONIC PAIN OF LEFT ANKLE: ICD-10-CM

## 2022-07-21 PROCEDURE — 99214 OFFICE O/P EST MOD 30 MIN: CPT | Performed by: NURSE PRACTITIONER

## 2022-07-21 NOTE — PROGRESS NOTES
"Subjective   Jaquan Mckay is a 51 y.o. male.     Chief Complaint   Patient presents with   • Hypertension       History of Present Illness     HTN-chronic and ongoing.  He is on Lisinopril 30 mg.  No negative side effects.  No associated symptoms such as CP, SOA, HA, or dizziness.  He is not checking BP at home as he does not have a meter.    Left ankle and heel pain-reports that his ankle brace is torn up (a strap has broken).  He reports that he is tender along his achilles as well as his lateral ankle. Pain radiates up the back of his leg to his knee region.  He has not been back to ortho recently.    GI follow up-reports he has been going 7-10 times per day for a BM.  He reports that he is cramping at his umbilicus and directly out to his sides.  He reports \"just like water\".  He was started on Colestid 1G BID per GI.  If not improved with meds in approx 2 weeks he will report back to GI and possibly have a repeat scope or increase his med dose.  He continues Dexilant and his GERD symptoms are improved.  He is also taking Famotidine 40 mg BID.   Sleep-interested in referral for sleep study/evaluation.  He has not had one in several years and reports he was not able to adjust to the cpap he was ordered.  He   Nasal sore-patient reports recurrent nasal sores.  He has been under care of allergy specialist and reports that \"they are doing better\".  He is out of bactroban ointment.  He request to have a refill in case he has reoccurrence.  He does not have any complaints today.     The following portions of the patient's history were reviewed and updated as appropriate: CC, ROS, allergies, current medications, past family history, past medical history, past social history, past surgical history and problem list.      Review of Systems   Constitutional: Positive for fatigue. Negative for activity change, appetite change and fever.   HENT: Positive for postnasal drip and sinus pressure. Negative for congestion, ear " "pain, facial swelling, nosebleeds, rhinorrhea, sore throat, trouble swallowing and voice change.    Eyes: Negative for blurred vision, double vision and redness.   Respiratory: Negative for cough, chest tightness, shortness of breath and wheezing.    Cardiovascular: Negative for chest pain, palpitations and leg swelling.   Gastrointestinal: Positive for abdominal pain and diarrhea. Negative for blood in stool, constipation, nausea and vomiting.   Endocrine: Negative.    Genitourinary: Negative for dysuria, flank pain, frequency, hematuria and urgency.   Musculoskeletal: Positive for arthralgias, back pain, gait problem, myalgias and neck stiffness.   Skin: Negative.    Allergic/Immunologic: Negative.    Neurological: Negative for dizziness, weakness, light-headedness and headache.   Hematological: Negative.    Psychiatric/Behavioral: Positive for dysphoric mood and stress. Negative for agitation, behavioral problems, self-injury, sleep disturbance and suicidal ideas. The patient is nervous/anxious.    All other systems reviewed and are negative.      Objective     /82   Pulse 94   Temp 98.2 °F (36.8 °C)   Resp 18   Ht 170.2 cm (67.01\")   Wt 107 kg (235 lb)   SpO2 98%   BMI 36.80 kg/m²     Physical Exam  Vitals reviewed.   Constitutional:       General: He is not in acute distress.     Appearance: He is well-developed. He is obese. He is not diaphoretic.   HENT:      Head: Normocephalic and atraumatic.      Jaw: No tenderness.      Comments: Oropharynx not examined.  Patient is presently wearing a face covering/mask due to COVID-19 pandemic.     Right Ear: Hearing, tympanic membrane, ear canal and external ear normal.      Left Ear: Hearing, tympanic membrane, ear canal and external ear normal.   Eyes:      General: Lids are normal. No scleral icterus.     Extraocular Movements:      Right eye: Normal extraocular motion and no nystagmus.      Left eye: Normal extraocular motion and no nystagmus.      " Conjunctiva/sclera: Conjunctivae normal.      Pupils: Pupils are equal, round, and reactive to light.   Neck:      Thyroid: No thyromegaly or thyroid tenderness.      Vascular: No carotid bruit or JVD.      Trachea: No tracheal tenderness.   Cardiovascular:      Rate and Rhythm: Normal rate and regular rhythm.      Pulses:           Dorsalis pedis pulses are 2+ on the right side and 2+ on the left side.        Posterior tibial pulses are 2+ on the right side and 2+ on the left side.      Heart sounds: Normal heart sounds, S1 normal and S2 normal. No murmur heard.  Pulmonary:      Effort: Pulmonary effort is normal. No accessory muscle usage, prolonged expiration or respiratory distress.      Breath sounds: Normal breath sounds.   Chest:      Chest wall: No tenderness.   Abdominal:      General: Bowel sounds are normal. There is no distension.      Palpations: Abdomen is soft. There is no mass.      Tenderness: There is no abdominal tenderness.   Musculoskeletal:         General: Tenderness present.      Cervical back: Normal range of motion and neck supple.      Thoracic back: Tenderness present.      Lumbar back: Tenderness present.      Right knee: Decreased range of motion.      Left knee: Decreased range of motion.      Right lower leg: No edema.      Left lower leg: No edema.      Left ankle: Tenderness present. Decreased range of motion.      Left Achilles Tendon: Tenderness present.      Comments: No muscular atrophy or flaccidity.   Lymphadenopathy:      Head:      Right side of head: No submental or submandibular adenopathy.      Left side of head: No submental or submandibular adenopathy.      Cervical: No cervical adenopathy.      Right cervical: No superficial cervical adenopathy.     Left cervical: No superficial cervical adenopathy.   Skin:     General: Skin is warm and dry.      Capillary Refill: Capillary refill takes less than 2 seconds.      Coloration: Skin is not jaundiced or pale.      Findings:  No erythema.      Nails: There is no clubbing.   Neurological:      Mental Status: He is alert and oriented to person, place, and time.      Cranial Nerves: No cranial nerve deficit or facial asymmetry.      Sensory: No sensory deficit.      Motor: No weakness, tremor, atrophy or abnormal muscle tone.      Coordination: Coordination normal.      Deep Tendon Reflexes: Reflexes are normal and symmetric.   Psychiatric:         Attention and Perception: He is attentive.         Mood and Affect: Mood is anxious and depressed.         Speech: Speech normal.         Behavior: Behavior normal. Behavior is cooperative.         Thought Content: Thought content normal.         Cognition and Memory: Cognition normal.         Judgment: Judgment normal.           Diagnoses and all orders for this visit:    1. Essential hypertension (Primary)  Assessment & Plan:  Continue lisinopril 30 mg.  Continue under the care of cardiology.  Ambulatory BP monitoring either at home or random community checks.  Patient to report continued elevations >140/90.  Patient may come by office for checks if needed.       2. Class 2 severe obesity due to excess calories with serious comorbidity and body mass index (BMI) of 36.0 to 36.9 in adult (HCC)  Assessment & Plan:  Continue to work on diet changes.   Be active as joint and back pain will allow.       3. Primary insomnia  Comments:  sleep study ordered.    Continue restoril.   continue to work on sleep hygeine  Orders:  -     Ambulatory Referral to Sleep Medicine    4. Other sleep apnea  Comments:  repeat sleep study.  will refer if needed to ENT or to sleep medicine   Orders:  -     Ambulatory Referral to Sleep Medicine    5. Nasal sore  Comments:  Bactroban ointment to area.  Report any nonhealing area or signs or symptoms of infection  Orders:  -     mupirocin (BACTROBAN) 2 % ointment; Apply  topically to the appropriate area as directed 3 (Three) Times a Day.  Dispense: 30 g; Refill: 2    6.  Chronic pain of left ankle  Comments:  will attempt to obtain a new foot/ankle brace  make an updated ortho appt       Class 2 Severe Obesity (BMI >=35 and <=39.9). Obesity-related health conditions include the following: hypertension, dyslipidemias and GERD. Obesity is improving with lifestyle modifications. BMI is is above average. We discussed portion control and increasing exercise.       Understands disease processes and need for medications.  Understands reasons for urgent and emergent care.  Patient (& family) verbalized agreement for treatment plan.   Emotional support and active listening provided.  Patient provided time to verbalize feelings.    [facility] reviewed today and consistent.  Will refill prescribed controlled medication today.  Patient is aware they cannot receive narcotics from any other provider except if under care of pain management or speciality clinic.  Risk and benefits of medication use has been reviewed.  History and physical exam exhibit continued safe and appropriate use of controlled substances.  The patient is aware of the potential for addiction and dependence.  This patient has been made aware of the appropriate use of such medications, including potential risk of somnolence, limited ability to drive and / or work safely, and potential for overdose.    It has also been made clear that these medications are for use by this patient only, without concomitant use of alcohol or other substances unless prescribed/advised by medical provider.  Patient understands they may be subject to UDS and pill counts at random.      Patient considered to be low risk for addiction due to use of single controlled medications.  Patient understands and accepts these risks.  Patient need for medication will be reassessed at each visit.  Doses will be adjusted according to patient need and findings.    Goal of [place]: Patient will not have any adverse reactions of medication.  Patient will have improvement  in sleep hygiene/pattern with use of Restoril as needed as directed.    Medication Dispense Information    Temazepam   Dispensed: 7/20/2022 12:00 AM   Written:  6/20/2022   Unit strength: 15MG   Days supply: 30   Dispense Note: GivenName=Henrietta=LUISBirthDate=1970Address[address], [place], [place], [address]   Quantity: 30 each   Pharmacy: [facility], [facility]   Authorizing provider: LENNOX ROE   Received from: [facility] (Fill History)   Brand or Generic:       RTC 1 month, sooner if needed.             This document has been electronically signed by:  BALDOMERO Thao, ROSIEC    Dragon disclaimer:  Part of this note may be an electronic transcription/translation of spoken language to printed text using the Dragon Dictation System.

## 2022-07-28 ENCOUNTER — OFFICE VISIT (OUTPATIENT)
Dept: CARDIOLOGY | Facility: CLINIC | Age: 52
End: 2022-07-28

## 2022-07-28 ENCOUNTER — TELEPHONE (OUTPATIENT)
Dept: UROLOGY | Facility: CLINIC | Age: 52
End: 2022-07-28

## 2022-07-28 VITALS
WEIGHT: 236.4 LBS | BODY MASS INDEX: 37.1 KG/M2 | TEMPERATURE: 100 F | DIASTOLIC BLOOD PRESSURE: 80 MMHG | HEIGHT: 67 IN | SYSTOLIC BLOOD PRESSURE: 127 MMHG

## 2022-07-28 DIAGNOSIS — I10 ESSENTIAL HYPERTENSION: ICD-10-CM

## 2022-07-28 DIAGNOSIS — G47.33 OSA (OBSTRUCTIVE SLEEP APNEA): ICD-10-CM

## 2022-07-28 DIAGNOSIS — R00.2 PALPITATIONS: Primary | ICD-10-CM

## 2022-07-28 PROCEDURE — 93242 EXT ECG>48HR<7D RECORDING: CPT | Performed by: INTERNAL MEDICINE

## 2022-07-28 PROCEDURE — 99214 OFFICE O/P EST MOD 30 MIN: CPT | Performed by: NURSE PRACTITIONER

## 2022-07-28 NOTE — TELEPHONE ENCOUNTER
The patient called and said that the Colestipol we prescribed him is too big and he can't swallow it. He is asking for a call back.

## 2022-07-28 NOTE — PROGRESS NOTES
Tiera Valenzuela APRN  Jaquan Mckay  1970 07/28/2022    Patient Active Problem List   Diagnosis   • Gastroesophageal reflux disease without esophagitis   • Essential hypertension   • Seizures (Formerly [facility])   • Hyperlipidemia   • Anxiety   • Varicocele   • Varicocele present on ultrasound of scrotum   • Chronic bilateral low back pain with left-sided sciatica   • Seasonal allergic rhinitis due to pollen   • Mild persistent asthma without complication   • Precordial pain   • Dysuria   • Congenital coronary artery anomaly   • BMI 35.0-35.9,adult   • Chondromalacia, knee   • Achilles tendinitis of both lower extremities   • Muscle spasm of both lower legs   • Personal history of allergy to shellfish   • Sensation of cold in lower extremity   • Varicose vein of leg   • Heart palpitations   • Shortness of breath   • Generalized anxiety disorder   • Chronic elbow pain, right   • Chest pain   • Unstable angina (Formerly [facility])   • ASCVD (arteriosclerotic cardiovascular disease)   • Elevated prolactin level   • Other constipation   • Class 2 severe obesity due to excess calories with serious comorbidity and body mass index (BMI) of 37.0 to 37.9 in adult (Formerly [facility])   • Bacteremia   • Therapeutic opioid-induced constipation (OIC)   • Rectal bleeding   • Bloating   • Generalized abdominal pain   • History of colon polyps   • Lateral epicondylitis of right elbow   • Psychophysiological insomnia   • Chronic rhinitis   • Vitamin D deficiency   • Renal calculus   • Chronic idiopathic constipation   • Diarrhea   • Bilateral flank pain   • BPH without obstruction/lower urinary tract symptoms   • Incomplete bladder emptying       Tiera Arreaga APRN:    Subjective     Chief Complaint   Patient presents with   • Follow-up     ROUTINE           History of Present Illness:    Jaquan Mckay is a 51 y.o. male with a past medical history of hypertension, dyslipidemia, and chronic chest pains with normal coronary arteries noted on left heart  "catheterization in 2018. He presents today for cardiology follow up. Recently, stress test was negative for ischemia. He reports only one episode of chest pain which was associated with palpitations.  He also had some associated dyspnea. Previously, he has declined wearing an holter or event monitor. He has had intermittent palpitations and dizziness in the past. He reports he has been diagnosed with sleep apnea in the past. When he was wearing oxygen at night all of his symptoms resolved.          Allergies   Allergen Reactions   • Ciprofloxacin Anaphylaxis and Hives   • Miralax [Polyethylene Glycol] Itching and Rash   • Mobic [Meloxicam] Other (See Comments)     Pt states, \"It make my feet and hands go numb and I can't hardly walk.\"    • Paxil [Paroxetine Hcl] Shortness Of Breath     Chest pain    • Peanut-Containing Drug Products Anaphylaxis   • Penicillins Anaphylaxis   • Pristiq [Desvenlafaxine Succinate Er] Dizziness   • Sulfa Antibiotics Anaphylaxis, Itching and Rash   • Doxycycline Hives   • Fish-Derived Products Hives   • Isosorbide Nitrate Rash     Rash, hives, had to use inhaler.    • Movantik [Naloxegol] Rash   • Seroquel [Quetiapine] Hives and Rash   • Buspar [Buspirone] Rash   • Clarithromycin Rash   • Clindamycin/Lincomycin Rash   • Codeine Rash   • Contrast Dye Itching and Rash   • Diltiazem Rash   • Flomax [Tamsulosin] Hives   • Gabapentin Rash   • Keflex [Cephalexin] Rash   • Linzess [Linaclotide] Rash   • Metoprolol Rash   • Prednisone Rash and Other (See Comments)     Face, feet, and legs go completely numb per patient   • Robitussin Cough+ Chest Max St [Dextromethorphan-Guaifenesin] Itching   • Shrimp (Diagnostic) Rash   • Spironolactone Rash   • Viibryd [Vilazodone Hcl] Itching and Rash   • Zoloft [Sertraline Hcl] Hives and Itching   :      Current Outpatient Medications:   •  albuterol sulfate  (90 Base) MCG/ACT inhaler, , Disp: , Rfl:   •  aspirin (aspirin) 81 MG EC tablet, Take 1 " tablet by mouth Daily., Disp: 30 tablet, Rfl: 5  •  azelastine (ASTELIN) 0.1 % nasal spray, , Disp: , Rfl:   •  colestipol (COLESTID) 1 g tablet, Take 1 tablet by mouth 2 (Two) Times a Day., Disp: 60 tablet, Rfl: 11  •  dexlansoprazole (Dexilant) 60 MG capsule, Take 1 capsule by mouth Daily., Disp: 30 capsule, Rfl: 5  •  dicyclomine (Bentyl) 10 MG capsule, Take 1 capsule by mouth 4 (Four) Times a Day Before Meals & at Bedtime., Disp: 120 capsule, Rfl: 11  •  Dilantin 100 MG capsule, Take 2 capsules by mouth 2 (Two) Times a Day., Disp: 120 capsule, Rfl: 5  •  diphenhydrAMINE (Benadryl Allergy) 25 MG tablet, Take 1-2 tablets by mouth Daily As Needed for Itching (or rash)., Disp: 60 tablet, Rfl: 1  •  Elastic Bandages & Supports (ACE Ankle Brace) misc, 1 each Daily., Disp: 1 each, Rfl: 0  •  famotidine (Pepcid) 40 MG tablet, Take 1 tablet by mouth 2 (Two) Times a Day., Disp: 60 tablet, Rfl: 5  •  fluticasone (FLONASE) 50 MCG/ACT nasal spray, , Disp: , Rfl:   •  lisinopril (PRINIVIL,ZESTRIL) 30 MG tablet, Take 1 tablet by mouth Daily., Disp: 30 tablet, Rfl: 5  •  loratadine (CLARITIN) 10 MG tablet, TAKE 1 TABLET BY MOUTH DAILY AS NEEDED FOR ALLERGIES., Disp: 30 tablet, Rfl: 5  •  methocarbamol (ROBAXIN) 750 MG tablet, TAKE 1 TABLET BY MOUTH 2 (TWO) TIMES A DAY AS NEEDED FOR MUSCLE SPASMS., Disp: 60 tablet, Rfl: 5  •  mirtazapine (REMERON) 30 MG tablet, Take 1.5 tablets by mouth Every Night., Disp: 45 tablet, Rfl: 5  •  mometasone (NASONEX) 50 MCG/ACT nasal spray, , Disp: , Rfl:   •  montelukast (SINGULAIR) 10 MG tablet, Take 10 mg by mouth., Disp: , Rfl:   •  potassium chloride (K-DUR,KLOR-CON) 10 MEQ CR tablet, TAKE 1 TABLET BY MOUTH DAILY., Disp: 30 tablet, Rfl: 5  •  tamsulosin (FLOMAX) 0.4 MG capsule 24 hr capsule, TAKE 1 CAPSULE BY MOUTH DAILY., Disp: 30 capsule, Rfl: 5  •  vitamin D (ERGOCALCIFEROL) 1.25 MG (87398 UT) capsule capsule, Take 1 capsule by mouth Every 7 (Seven) Days., Disp: 4 capsule, Rfl: 05  •   "clobetasol (TEMOVATE) 0.05 % ointment, Apply 1 application topically to the appropriate area as directed 2 (Two) Times a Day., Disp: 60 g, Rfl: 2  •  Flovent HFA 44 MCG/ACT inhaler, , Disp: , Rfl:   •  HYDROcodone-acetaminophen (NORCO) 5-325 MG per tablet, Take 1 tablet by mouth Every 8 (Eight) Hours As Needed for Moderate Pain ., Disp: 9 tablet, Rfl: 0  •  ketorolac (TORADOL) 10 MG tablet, Take 1 tablet by mouth Every 6 (Six) Hours As Needed for Moderate Pain ., Disp: 16 tablet, Rfl: 0  •  mupirocin (BACTROBAN) 2 % ointment, Apply  topically to the appropriate area as directed 3 (Three) Times a Day., Disp: 30 g, Rfl: 2  •  temazepam (Restoril) 15 MG capsule, Take 1 capsule by mouth At Night As Needed for Sleep., Disp: 30 capsule, Rfl: 2      The following portions of the patient's history were reviewed and updated as appropriate: allergies, current medications, past family history, past medical history, past social history, past surgical history and problem list.    Social History     Tobacco Use   • Smoking status: Current Some Day Smoker     Packs/day: 0.25     Years: 17.00     Pack years: 4.25     Types: Cigars, Cigarettes     Start date: 4/11/2022   • Smokeless tobacco: Never Used   Vaping Use   • Vaping Use: Never used   Substance Use Topics   • Alcohol use: No   • Drug use: No       ROS    Objective   Vitals:    07/28/22 1302   BP: 127/80   Temp: 100 °F (37.8 °C)   Weight: 107 kg (236 lb 6.4 oz)   Height: 170.2 cm (67\")     Body mass index is 37.03 kg/m².        Vitals reviewed.   Constitutional:       Appearance: Healthy appearance. Well-developed and not in distress.   HENT:      Head: Normocephalic and atraumatic.   Pulmonary:      Effort: Pulmonary effort is normal.      Breath sounds: Normal breath sounds. No wheezing. No rales.   Cardiovascular:      Normal rate. Regular rhythm.      Murmurs: There is no murmur.      . No S3 and S4 gallop.   Edema:     Peripheral edema absent.   Abdominal:      General: " Bowel sounds are normal.      Palpations: Abdomen is soft.   Skin:     General: Skin is warm and dry.   Neurological:      Mental Status: Alert, oriented to person, place, and time and oriented to person, place and time.   Psychiatric:         Mood and Affect: Mood normal.         Behavior: Behavior normal.         Lab Results   Component Value Date     (L) 05/05/2022    K 4.5 05/05/2022    CL 99 05/05/2022    CO2 24.0 05/05/2022    BUN 15 05/05/2022    CREATININE 1.27 05/05/2022    GLUCOSE 99 05/05/2022    CALCIUM 9.3 05/05/2022    AST 28 05/05/2022    ALT 23 05/05/2022    ALKPHOS 103 05/05/2022    LABIL2 1.1 (L) 05/29/2016     Lab Results   Component Value Date    CKTOTAL 153 06/20/2019     Lab Results   Component Value Date    WBC 6.76 05/05/2022    HGB 16.0 05/05/2022    HCT 45.5 05/05/2022     05/05/2022     Lab Results   Component Value Date    INR 0.98 09/24/2019    INR 1.03 02/06/2018    INR 0.97 09/26/2017     Lab Results   Component Value Date    MG 1.6 08/21/2020     Lab Results   Component Value Date    TSH 2.990 05/05/2022    PSA 1.0 07/01/2021    CHLPL 232 (H) 04/05/2016    TRIG 95 05/05/2022    HDL 71 (H) 05/05/2022     (H) 05/05/2022      Lab Results   Component Value Date    BNP 3.0 09/26/2018           Procedures      Assessment & Plan    Diagnosis Plan   1. Palpitations  Holter Monitor - 72 Hour Up To 15 Days   2. PRANAY (obstructive sleep apnea)  Overnight Sleep Oximetry Study   3. Essential hypertension                  Recommendations:    1. Palpitations - he is agreeable to wear a holter monitor. Will order a 14 day monitor for his recent palpitations and dizziness.  2. PRANAY - will evaluate with an overnight pulse oximetry study and initiate oxygen if indicated.  3. Essential hypertension - well controlled.  4. Follow up in 2 months or sooner if needed.         Return in about 2 months (around 9/28/2022) for Recheck.    As always, I appreciate very much the opportunity to  participate in the cardiovascular care of your patients.      With Best Regards,    BALDOMERO Horowitz

## 2022-08-01 ENCOUNTER — TELEPHONE (OUTPATIENT)
Dept: FAMILY MEDICINE CLINIC | Facility: CLINIC | Age: 52
End: 2022-08-01

## 2022-08-01 NOTE — TELEPHONE ENCOUNTER
Delete after reviewing: Telephone encounter to be sent to clinical pool.    Caller: [facility]NY[facility] (SLEEP [facility]UDIES)     Relationship: [facility]EP [facility]IES    Best call back number: [phone]    What is your medical concern?  YONNY FROM SLEEP STUDIES WANTED DR ROE TO GIVE THE PATIENT A CALL TO EXPLAIN THE SLEEP STUDY PROCEDURE BEING DONE. THE PATIENT TOLD THE SPECIALIST THAT HE CANT HAVE ANYTHING IN HIS NOSE. THEY TRIED TO EXPLAIN TO HIM IT WAS A PULSE OX BUT HE DOESN'T FULLY COMPREHEND. PLEASE GIVE THE CUSTOMER A CALL BACK.    How long has this issue been going on? NA    Is your provider already aware of this issue? YES     Have you been treated for this issue? NO

## 2022-08-03 ENCOUNTER — TELEPHONE (OUTPATIENT)
Dept: CARDIOLOGY | Facility: CLINIC | Age: 52
End: 2022-08-03

## 2022-08-03 DIAGNOSIS — R19.7 DIARRHEA, UNSPECIFIED TYPE: Primary | ICD-10-CM

## 2022-08-03 RX ORDER — CHOLESTYRAMINE 4 G/9G
4 POWDER, FOR SUSPENSION ORAL 2 TIMES DAILY
Qty: 378 G | Refills: 3 | Status: SHIPPED | OUTPATIENT
Start: 2022-08-03

## 2022-08-03 NOTE — TELEPHONE ENCOUNTER
Called karishma and explained that radha did order a sleep study and he said he was going to get this done.

## 2022-08-05 NOTE — TELEPHONE ENCOUNTER
Pharmacy called and said patient said that he was suppose to have a new medication called in. He said he was not sure what the name of the medication was. Can someone check in on this and call the pharmacy back? Ask for Jeremie.      Thanks!   Home

## 2022-08-11 DIAGNOSIS — G89.29 CHRONIC BILATERAL LOW BACK PAIN WITH LEFT-SIDED SCIATICA: ICD-10-CM

## 2022-08-11 DIAGNOSIS — N40.0 BPH WITHOUT OBSTRUCTION/LOWER URINARY TRACT SYMPTOMS: ICD-10-CM

## 2022-08-11 DIAGNOSIS — M54.42 CHRONIC BILATERAL LOW BACK PAIN WITH LEFT-SIDED SCIATICA: ICD-10-CM

## 2022-08-11 RX ORDER — TAMSULOSIN HYDROCHLORIDE 0.4 MG/1
CAPSULE ORAL
Qty: 30 CAPSULE | Refills: 5 | Status: SHIPPED | OUTPATIENT
Start: 2022-08-11 | End: 2023-02-16

## 2022-08-11 RX ORDER — METHOCARBAMOL 750 MG/1
750 TABLET, FILM COATED ORAL 2 TIMES DAILY PRN
Qty: 60 TABLET | Refills: 5 | Status: SHIPPED | OUTPATIENT
Start: 2022-08-11 | End: 2022-10-10

## 2022-08-12 ENCOUNTER — TREATMENT (OUTPATIENT)
Dept: CARDIOLOGY | Facility: CLINIC | Age: 52
End: 2022-08-12

## 2022-08-12 DIAGNOSIS — R00.2 PALPITATIONS: ICD-10-CM

## 2022-08-12 PROCEDURE — 93244 EXT ECG>48HR<7D REV&INTERPJ: CPT | Performed by: INTERNAL MEDICINE

## 2022-09-01 ENCOUNTER — OFFICE VISIT (OUTPATIENT)
Dept: FAMILY MEDICINE CLINIC | Facility: CLINIC | Age: 52
End: 2022-09-01

## 2022-09-01 VITALS
HEART RATE: 86 BPM | WEIGHT: 239.4 LBS | RESPIRATION RATE: 16 BRPM | SYSTOLIC BLOOD PRESSURE: 126 MMHG | OXYGEN SATURATION: 98 % | HEIGHT: 67 IN | TEMPERATURE: 98 F | DIASTOLIC BLOOD PRESSURE: 80 MMHG | BODY MASS INDEX: 37.57 KG/M2

## 2022-09-01 DIAGNOSIS — Z79.899 LONG-TERM USE OF HIGH-RISK MEDICATION: ICD-10-CM

## 2022-09-01 DIAGNOSIS — K21.9 GASTROESOPHAGEAL REFLUX DISEASE WITHOUT ESOPHAGITIS: ICD-10-CM

## 2022-09-01 DIAGNOSIS — J45.30 MILD PERSISTENT ASTHMA WITHOUT COMPLICATION: Primary | ICD-10-CM

## 2022-09-01 DIAGNOSIS — R21 RASH AND OTHER NONSPECIFIC SKIN ERUPTION: ICD-10-CM

## 2022-09-01 DIAGNOSIS — M25.522 LEFT ELBOW PAIN: ICD-10-CM

## 2022-09-01 DIAGNOSIS — J30.1 SEASONAL ALLERGIC RHINITIS DUE TO POLLEN: ICD-10-CM

## 2022-09-01 DIAGNOSIS — E55.9 VITAMIN D DEFICIENCY: ICD-10-CM

## 2022-09-01 DIAGNOSIS — R30.0 DYSURIA: ICD-10-CM

## 2022-09-01 LAB
BILIRUB BLD-MCNC: NEGATIVE MG/DL
CLARITY, POC: CLEAR
COLOR UR: YELLOW
EXPIRATION DATE: NORMAL
GLUCOSE UR STRIP-MCNC: NEGATIVE MG/DL
KETONES UR QL: NEGATIVE
LEUKOCYTE EST, POC: NEGATIVE
Lab: NORMAL
NITRITE UR-MCNC: NEGATIVE MG/ML
PH UR: 5.5 [PH] (ref 5–8)
PROT UR STRIP-MCNC: NEGATIVE MG/DL
RBC # UR STRIP: NEGATIVE /UL
SP GR UR: 1.03 (ref 1–1.03)
UROBILINOGEN UR QL: NORMAL

## 2022-09-01 PROCEDURE — 96372 THER/PROPH/DIAG INJ SC/IM: CPT | Performed by: NURSE PRACTITIONER

## 2022-09-01 PROCEDURE — 99214 OFFICE O/P EST MOD 30 MIN: CPT | Performed by: NURSE PRACTITIONER

## 2022-09-01 RX ORDER — AZITHROMYCIN 250 MG/1
TABLET, FILM COATED ORAL
Qty: 6 TABLET | Refills: 0 | Status: SHIPPED | OUTPATIENT
Start: 2022-09-01 | End: 2022-09-29

## 2022-09-01 RX ORDER — DEXAMETHASONE SODIUM PHOSPHATE 4 MG/ML
4 INJECTION, SOLUTION INTRA-ARTICULAR; INTRALESIONAL; INTRAMUSCULAR; INTRAVENOUS; SOFT TISSUE ONCE
Status: COMPLETED | OUTPATIENT
Start: 2022-09-01 | End: 2022-09-01

## 2022-09-01 RX ORDER — DIPHENHYDRAMINE HCL 25 MG
25-50 TABLET ORAL EVERY 8 HOURS PRN
Qty: 120 TABLET | Refills: 2 | Status: SHIPPED | OUTPATIENT
Start: 2022-09-01

## 2022-09-01 RX ADMIN — DEXAMETHASONE SODIUM PHOSPHATE 4 MG: 4 INJECTION, SOLUTION INTRA-ARTICULAR; INTRALESIONAL; INTRAMUSCULAR; INTRAVENOUS; SOFT TISSUE at 14:55

## 2022-09-01 NOTE — ASSESSMENT & PLAN NOTE
Continue under the care of allergy specialist.  Continue claritin, singulair, flonase, and astelin  Continue allergy injections

## 2022-09-01 NOTE — PROGRESS NOTES
"Subjective   Jaquan Mckay is a 51 y.o. male.     Chief Complaint   Patient presents with   • Hypertension       History of Present Illness     Hypertension-chronic and ongoing.  Patient is currently taking lisinopril 30 mg.  No negative side effects.  He is under the care of cardiology.  He denies chest pain or palpitations today.  No unusual headache or dizziness is reported.  GI problem/bowel concern- patient has a recurrent GI problems including either constipation or diarrhea.  He is under the care of GI.  He reports that he developed a rash with Cholestyramine after the 3rd pack or so.  He reports that he got a generalized body rash.  He reports \"severe diarrhea\".   He reports he has been 7 times today since \"6:30\" (this morning)    GERD-ongoing.  Patient is currently ordered Dexilant 60 mg and famotidine 40 mg twice daily.  No negative side effects.  No negative side effects of medication.  Patient does avoid foods that trigger reflux symptoms.  Denies any recent exacerbations.  Chronic allergic rhinitis-patient is under the care of allergy specialist.  He is receiving immunotherapy.  He reports the nasal sores that he generally gets does seem to be improving.  He is currently on Astelin nasal spray as well as Flonase nasal spray.  He is also on Claritin 10 mg and montelukast 10 mg.  Negative side effects of medication.  Chronic foot problem- patient continues to have foot pain.  He reports he is not able to get a new braces despite his last one tearing up as it is not time for his insurance to cover one.  He reports he does not have one at all.  He reports that last night it popped when he got up to step and \"I thought it came out of the socket\".  He reports that he fell down into the floor when it happened.    Insomnia-ongoing.  Patient continues to struggle with depression and insomnia.  He is currently on mirtazapine 30 mg.  He is also on Restoril 15 mg.reports he does \"ok\" mostly but last night \"slept 3 " "hours\".   Vitamin D deficiency-chronic and ongoing.  Patient is presently taking vitamin D 50,000 units supplement.  No negative side effects of medication are reported.  Vitamin D level is stable with medication use.  Dysuria-concerned he has a UTI.  He would like to have a urine dip today while in the office.  He reports it is dark and he is burning.  He reports some LBP. He reports some bladder pressure.  He reports has been \"like that for about 2 weeks\".     Bee sting-reports he got stung multiple times while outside and had to go to urgent care.  He was advised to take Benadryl but did not need his Epipen.  He reports that the sting was on his hand but he got very itchy everywhere and got a generalized rash.  He did not develop any SOA.   Left Elbow pain-has an appt with Dr Dubin on 09/12/22 for evaluation.  Mild elbow swelling.   He has some generalized numbness.  He is concerned he has nerve entrapment as it feels like his right did when he had to have nerve release.   Breathing-reports since he was out of town several days ago on a [place] trip, he has needed his albuterol more than usual.  He reports that he feels tight and some mild SOA.  He feels that he is coughing but secretions are not colored.  He was exposed to \"a lot of pollen\" and some coal soot.  He reports that he feels some airway tightness.       The following portions of the patient's history were reviewed and updated as appropriate: CC, ROS, allergies, current medications, past family history, past medical history, past social history, past surgical history and problem list.    Review of Systems   Constitutional: Positive for fatigue. Negative for activity change, appetite change and fever.   HENT: Negative for congestion, ear pain, facial swelling, nosebleeds, rhinorrhea, sinus pressure, sore throat and trouble swallowing.    Eyes: Negative for blurred vision, double vision and redness.   Respiratory: Negative for cough, chest tightness, " "shortness of breath and wheezing.    Cardiovascular: Negative for chest pain, palpitations and leg swelling.   Gastrointestinal: Positive for abdominal pain and diarrhea. Negative for blood in stool, constipation, nausea and vomiting.   Endocrine: Negative.    Genitourinary: Negative for dysuria, flank pain, frequency, hematuria and urgency.   Musculoskeletal: Positive for arthralgias, back pain, gait problem and myalgias.   Skin: Negative.    Allergic/Immunologic: Negative.    Neurological: Negative for weakness, light-headedness and headache.   Hematological: Negative.    Psychiatric/Behavioral: Positive for sleep disturbance and stress. Negative for decreased concentration, self-injury, suicidal ideas and depressed mood. The patient is nervous/anxious.    All other systems reviewed and are negative.      Objective     /80   Pulse 86   Temp 98 °F (36.7 °C)   Resp 16   Ht 170.2 cm (67.01\")   Wt 109 kg (239 lb 6.4 oz)   SpO2 98%   BMI 37.49 kg/m²     Physical Exam  Vitals reviewed.   Constitutional:       General: He is not in acute distress.     Appearance: He is well-developed. He is obese. He is not diaphoretic.   HENT:      Head: Normocephalic and atraumatic.      Jaw: No tenderness.      Comments: Oropharynx not examined.  Patient is presently wearing a face covering/mask due to COVID-19 pandemic.     Right Ear: Hearing, tympanic membrane, ear canal and external ear normal.      Left Ear: Hearing, tympanic membrane, ear canal and external ear normal.   Eyes:      General: Lids are normal. No scleral icterus.     Extraocular Movements:      Right eye: Normal extraocular motion and no nystagmus.      Left eye: Normal extraocular motion and no nystagmus.      Conjunctiva/sclera: Conjunctivae normal.      Pupils: Pupils are equal, round, and reactive to light.   Neck:      Thyroid: No thyromegaly or thyroid tenderness.      Vascular: No carotid bruit or JVD.      Trachea: No tracheal tenderness. "   Cardiovascular:      Rate and Rhythm: Normal rate and regular rhythm.      Pulses:           Dorsalis pedis pulses are 2+ on the right side and 2+ on the left side.        Posterior tibial pulses are 2+ on the right side and 2+ on the left side.      Heart sounds: Normal heart sounds, S1 normal and S2 normal. No murmur heard.  Pulmonary:      Effort: Pulmonary effort is normal. No accessory muscle usage, prolonged expiration or respiratory distress.      Breath sounds: Normal breath sounds.   Chest:      Chest wall: No tenderness.   Abdominal:      General: Bowel sounds are normal. There is no distension.      Palpations: Abdomen is soft. There is no mass.      Tenderness: There is no abdominal tenderness.   Musculoskeletal:      Right elbow: Tenderness present.      Left elbow: Tenderness present.      Cervical back: Normal range of motion and neck supple.      Thoracic back: Tenderness present.      Lumbar back: Tenderness present. Decreased range of motion.      Right lower leg: No edema.      Left lower leg: No edema.      Left foot: Decreased range of motion. Normal capillary refill. Swelling, deformity and tenderness present. Normal pulse.      Comments: No muscular atrophy or flaccidity.   Lymphadenopathy:      Head:      Right side of head: No submental or submandibular adenopathy.      Left side of head: No submental or submandibular adenopathy.      Cervical: No cervical adenopathy.      Right cervical: No superficial cervical adenopathy.     Left cervical: No superficial cervical adenopathy.   Skin:     General: Skin is warm and dry.      Capillary Refill: Capillary refill takes less than 2 seconds.      Coloration: Skin is not jaundiced or pale.      Findings: No erythema.      Nails: There is no clubbing.   Neurological:      Mental Status: He is alert and oriented to person, place, and time.      Cranial Nerves: No cranial nerve deficit or facial asymmetry.      Sensory: No sensory deficit.       Motor: No weakness, tremor, atrophy or abnormal muscle tone.      Coordination: Coordination normal.      Deep Tendon Reflexes: Reflexes are normal and symmetric.   Psychiatric:         Attention and Perception: He is attentive.         Mood and Affect: Mood normal.         Speech: Speech normal.         Behavior: Behavior normal. Behavior is cooperative.         Thought Content: Thought content normal.         Judgment: Judgment normal.           Diagnoses and all orders for this visit:    1. Mild persistent asthma without complication (Primary)  -     dexamethasone (DECADRON) injection 4 mg  -     azithromycin (Zithromax Z-Clark) 250 MG tablet; Take 2 tablets by mouth on day 1, then 1 tablet daily on days 2-5  Dispense: 6 tablet; Refill: 0    2. Dysuria  Comments:  Continue to push fluids.  Continue under the care of urology  Orders:  -     POC Urinalysis Dipstick, Automated    3. Rash and other nonspecific skin eruption  Comments:  will provide benadryl for prophylaxis.  patient has had multiple allergic reactions.   Orders:  -     diphenhydrAMINE (Benadryl Allergy) 25 MG tablet; Take 1-2 tablets by mouth Every 8 (Eight) Hours As Needed for Itching (or rash).  Dispense: 120 tablet; Refill: 2    4. Seasonal allergic rhinitis due to pollen  Comments:  Continue immunotherapy.  Continue medications as directed.  Assessment & Plan:  Continue under the care of allergy specialist.  Continue claritin, singulair, flonase, and astelin  Continue allergy injections      5. Gastroesophageal reflux disease without esophagitis  Assessment & Plan:  Continue Dexilant 60 mg and pepcid 40 mg BID as directed.  Continue under the care of GI as directed      6. Left elbow pain  Comments:  keep ortho appt for evaluation    7. Vitamin D deficiency  Assessment & Plan:  Continue vitamin D as directed.  Report any negative side effects.  We will continue to monitor vitamin D labs and adjust supplement if necessary.           Class 2 Severe  Obesity (BMI >=35 and <=39.9). Obesity-related health conditions include the following: hypertension, dyslipidemias, GERD and osteoarthritis. Obesity is unchanged. BMI is is above average. We discussed portion control and increasing exercise.       Understands disease processes and need for medications.  Understands reasons for urgent and emergent care.  Patient (& family) verbalized agreement for treatment plan.   Emotional support and active listening provided.  Patient provided time to verbalize feelings.  [facility]MDP reviewed today and consistent.  Will refill prescribed controlled medication today.  Patient is aware they cannot receive narcotics from any other provider except if under care of pain management or speciality clinic.  Risk and benefits of medication use has been reviewed.  History and physical exam exhibit continued safe and appropriate use of controlled substances.  The patient is aware of the potential for addiction and dependence.  This patient has been made aware of the appropriate use of such medications, including potential risk of somnolence, limited ability to drive and / or work safely, and potential for overdose.    It has also been made clear that these medications are for use by this patient only, without concomitant use of alcohol or other substances unless prescribed/advised by medical provider.  Patient understands they may be subject to UDS and pill counts at random.      Patient considered to be low risk for addiction due to use of single controlled medications.  Patient understands and accepts these risks.  Patient need for medication will be reassessed at each visit.  Doses will be adjusted according to patient need and findings.    Goal of TX: Patient will not have any adverse reactions of medication.  Patient will have improvement in sleep hygiene/pattern with use of Restoril as needed as directed.    Medication Dispense Information    Temazepam   Dispensed: 8/22/2022 12:00 AM    Written:  6/20/2022   Unit strength: 15MG   Days supply: 30   Dispense Note: GivenName=Henrietta=LUISBirthDate=1970Address=[address], [place], [address]   Quantity: 30 each   Pharmacy: [facility], [facility]   Authorizing provider: LENNOX ROE   Received from: [facility] (Fill History)   Brand or Generic:       RTC 1 month, sooner if needed.           This document has been electronically signed by:  BALDOMEOR Thao FNP-C Dragon disclaimer:  Part of this note may be an electronic transcription/translation of spoken language to printed text using the Dragon Dictation System.

## 2022-09-01 NOTE — ASSESSMENT & PLAN NOTE
Continue Dexilant 60 mg and pepcid 40 mg BID as directed.  Continue under the care of GI as directed

## 2022-09-14 ENCOUNTER — OFFICE VISIT (OUTPATIENT)
Dept: FAMILY MEDICINE CLINIC | Facility: CLINIC | Age: 52
End: 2022-09-14

## 2022-09-14 VITALS
TEMPERATURE: 98.2 F | DIASTOLIC BLOOD PRESSURE: 70 MMHG | WEIGHT: 239 LBS | OXYGEN SATURATION: 98 % | SYSTOLIC BLOOD PRESSURE: 115 MMHG | HEIGHT: 67 IN | HEART RATE: 77 BPM | BODY MASS INDEX: 37.51 KG/M2

## 2022-09-14 DIAGNOSIS — R53.83 FATIGUE, UNSPECIFIED TYPE: ICD-10-CM

## 2022-09-14 DIAGNOSIS — R51.9 ACUTE INTRACTABLE HEADACHE, UNSPECIFIED HEADACHE TYPE: ICD-10-CM

## 2022-09-14 DIAGNOSIS — R56.9 SEIZURE: Primary | ICD-10-CM

## 2022-09-14 PROBLEM — R06.02 SHORTNESS OF BREATH: Status: RESOLVED | Noted: 2018-06-26 | Resolved: 2022-09-14

## 2022-09-14 PROBLEM — R10.84 GENERALIZED ABDOMINAL PAIN: Status: RESOLVED | Noted: 2020-05-21 | Resolved: 2022-09-14

## 2022-09-14 PROBLEM — R30.0 DYSURIA: Status: RESOLVED | Noted: 2017-05-19 | Resolved: 2022-09-14

## 2022-09-14 PROBLEM — R79.89 ELEVATED PROLACTIN LEVEL: Status: RESOLVED | Noted: 2019-03-12 | Resolved: 2022-09-14

## 2022-09-14 PROBLEM — R07.9 CHEST PAIN: Status: RESOLVED | Noted: 2018-09-25 | Resolved: 2022-09-14

## 2022-09-14 LAB
ALBUMIN SERPL-MCNC: 4.65 G/DL (ref 3.5–5.2)
ALBUMIN/GLOB SERPL: 1.3 G/DL
ALP SERPL-CCNC: 99 U/L (ref 39–117)
ALT SERPL W P-5'-P-CCNC: 20 U/L (ref 1–41)
ANION GAP SERPL CALCULATED.3IONS-SCNC: 10.7 MMOL/L (ref 5–15)
AST SERPL-CCNC: 23 U/L (ref 1–40)
BASOPHILS # BLD AUTO: 0.02 10*3/MM3 (ref 0–0.2)
BASOPHILS NFR BLD AUTO: 0.4 % (ref 0–1.5)
BILIRUB BLD-MCNC: NEGATIVE MG/DL
BILIRUB SERPL-MCNC: 0.2 MG/DL (ref 0–1.2)
BUN SERPL-MCNC: 17 MG/DL (ref 6–20)
BUN/CREAT SERPL: 13.2 (ref 7–25)
CALCIUM SPEC-SCNC: 9.6 MG/DL (ref 8.6–10.5)
CHLORIDE SERPL-SCNC: 102 MMOL/L (ref 98–107)
CLARITY, POC: ABNORMAL
CO2 SERPL-SCNC: 26.3 MMOL/L (ref 22–29)
COLOR UR: YELLOW
CREAT SERPL-MCNC: 1.29 MG/DL (ref 0.76–1.27)
DEPRECATED RDW RBC AUTO: 45.1 FL (ref 37–54)
EGFRCR SERPLBLD CKD-EPI 2021: 68 ML/MIN/1.73
EOSINOPHIL # BLD AUTO: 0.07 10*3/MM3 (ref 0–0.4)
EOSINOPHIL NFR BLD AUTO: 1.3 % (ref 0.3–6.2)
ERYTHROCYTE [DISTWIDTH] IN BLOOD BY AUTOMATED COUNT: 12.8 % (ref 12.3–15.4)
EXPIRATION DATE: ABNORMAL
GLOBULIN UR ELPH-MCNC: 3.6 GM/DL
GLUCOSE SERPL-MCNC: 99 MG/DL (ref 65–99)
GLUCOSE UR STRIP-MCNC: NEGATIVE MG/DL
HCT VFR BLD AUTO: 48.2 % (ref 37.5–51)
HGB BLD-MCNC: 16.6 G/DL (ref 13–17.7)
IMM GRANULOCYTES # BLD AUTO: 0.02 10*3/MM3 (ref 0–0.05)
IMM GRANULOCYTES NFR BLD AUTO: 0.4 % (ref 0–0.5)
KETONES UR QL: NEGATIVE
LEUKOCYTE EST, POC: NEGATIVE
LYMPHOCYTES # BLD AUTO: 1.66 10*3/MM3 (ref 0.7–3.1)
LYMPHOCYTES NFR BLD AUTO: 31.4 % (ref 19.6–45.3)
Lab: ABNORMAL
MAGNESIUM SERPL-MCNC: 1.8 MG/DL (ref 1.6–2.6)
MCH RBC QN AUTO: 32.9 PG (ref 26.6–33)
MCHC RBC AUTO-ENTMCNC: 34.4 G/DL (ref 31.5–35.7)
MCV RBC AUTO: 95.4 FL (ref 79–97)
MONOCYTES # BLD AUTO: 0.61 10*3/MM3 (ref 0.1–0.9)
MONOCYTES NFR BLD AUTO: 11.6 % (ref 5–12)
NEUTROPHILS NFR BLD AUTO: 2.9 10*3/MM3 (ref 1.7–7)
NEUTROPHILS NFR BLD AUTO: 54.9 % (ref 42.7–76)
NITRITE UR-MCNC: NEGATIVE MG/ML
NRBC BLD AUTO-RTO: 0 /100 WBC (ref 0–0.2)
PH UR: 5.5 [PH] (ref 5–8)
PLATELET # BLD AUTO: 222 10*3/MM3 (ref 140–450)
PMV BLD AUTO: 11.5 FL (ref 6–12)
POTASSIUM SERPL-SCNC: 4.5 MMOL/L (ref 3.5–5.2)
PROT SERPL-MCNC: 8.2 G/DL (ref 6–8.5)
PROT UR STRIP-MCNC: NEGATIVE MG/DL
RBC # BLD AUTO: 5.05 10*6/MM3 (ref 4.14–5.8)
RBC # UR STRIP: NEGATIVE /UL
SODIUM SERPL-SCNC: 139 MMOL/L (ref 136–145)
SP GR UR: 1.02 (ref 1–1.03)
TSH SERPL DL<=0.05 MIU/L-ACNC: 3.07 UIU/ML (ref 0.27–4.2)
UROBILINOGEN UR QL: NORMAL
WBC NRBC COR # BLD: 5.28 10*3/MM3 (ref 3.4–10.8)

## 2022-09-14 PROCEDURE — 36415 COLL VENOUS BLD VENIPUNCTURE: CPT | Performed by: PHYSICIAN ASSISTANT

## 2022-09-14 PROCEDURE — 99214 OFFICE O/P EST MOD 30 MIN: CPT | Performed by: PHYSICIAN ASSISTANT

## 2022-09-14 PROCEDURE — 80185 ASSAY OF PHENYTOIN TOTAL: CPT | Performed by: PHYSICIAN ASSISTANT

## 2022-09-14 PROCEDURE — 80053 COMPREHEN METABOLIC PANEL: CPT | Performed by: PHYSICIAN ASSISTANT

## 2022-09-14 PROCEDURE — 80307 DRUG TEST PRSMV CHEM ANLYZR: CPT | Performed by: PHYSICIAN ASSISTANT

## 2022-09-14 PROCEDURE — 85025 COMPLETE CBC W/AUTO DIFF WBC: CPT | Performed by: PHYSICIAN ASSISTANT

## 2022-09-14 PROCEDURE — 84443 ASSAY THYROID STIM HORMONE: CPT | Performed by: PHYSICIAN ASSISTANT

## 2022-09-14 PROCEDURE — 83735 ASSAY OF MAGNESIUM: CPT | Performed by: PHYSICIAN ASSISTANT

## 2022-09-14 NOTE — PROGRESS NOTES
"    Subjective        Chief Complaint  Seizures and Dizziness    Subjective      History of Present Illness  Jaquan Mckay is a 51 y.o. male who presents today to [facility] for reports of seizures. Past medical history is significant for prior history of brain tumor s/p removal, seizure disorder on dilantin, HTN, HLD, generalized anxiety disorder, and obesity per BMI.     Seizures:   Fatigue:   Headache:   History of brain tumor s/p removal:  He reports a past history of brain tumor which was removed remotely.  After that, he developed intermittent seizures.  He has been on Dilantin for some time at the current dose.  He reports chronic intermittent \"small\" seizures which he was told that he will have for life.  This past Sunday, she reports having a seizure at approximately 3 AM.  He is unsure how long it lasted.  He did urinate on himself.  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  He did not lose control of his bowels or bladder at that time.  He denies any missed doses of his Dilantin.  Denies any recent new medications or change in doses.  He was recently prescribed an azithromycin Dosepak as well as Benadryl.  He denies taking the azithromycin as he had not yet been to the pharmacy.  He had also not taken the Benadryl in several weeks. He denies any feeling of acute illness. No fever, cough, chills, dysuria, or diarrhea.  He does have generalized fatigue and has had a right-sided headache since Sunday. He has some improvement in the headache with tylenol, but it has not completely resolved.       Current Outpatient Medications:   •  albuterol sulfate  (90 Base) MCG/ACT inhaler, , Disp: , Rfl:   •  aspirin (aspirin) 81 MG EC tablet, Take 1 tablet by mouth Daily., Disp: 30 tablet, Rfl: 5  •  azelastine (ASTELIN) 0.1 % nasal spray, , Disp: , Rfl:   •  azithromycin (Zithromax Z-Clark) 250 MG tablet, Take 2 tablets " by mouth on day 1, then 1 tablet daily on days 2-5, Disp: 6 tablet, Rfl: 0  •  cholestyramine (QUESTRAN) 4 GM/DOSE powder, Take 1 packet by mouth 2 (Two) Times a Day. mixed with a liquid, Disp: 378 g, Rfl: 3  •  clobetasol (TEMOVATE) 0.05 % ointment, Apply 1 application topically to the appropriate area as directed 2 (Two) Times a Day., Disp: 60 g, Rfl: 2  •  dexlansoprazole (Dexilant) 60 MG capsule, Take 1 capsule by mouth Daily., Disp: 30 capsule, Rfl: 5  •  dicyclomine (Bentyl) 10 MG capsule, Take 1 capsule by mouth 4 (Four) Times a Day Before Meals & at Bedtime., Disp: 120 capsule, Rfl: 11  •  Dilantin 100 MG capsule, Take 2 capsules by mouth 2 (Two) Times a Day., Disp: 120 capsule, Rfl: 5  •  diphenhydrAMINE (Benadryl Allergy) 25 MG tablet, Take 1-2 tablets by mouth Every 8 (Eight) Hours As Needed for Itching (or rash)., Disp: 120 tablet, Rfl: 2  •  Elastic Bandages & Supports (ACE Ankle Brace) misc, 1 each Daily., Disp: 1 each, Rfl: 0  •  famotidine (Pepcid) 40 MG tablet, Take 1 tablet by mouth 2 (Two) Times a Day., Disp: 60 tablet, Rfl: 5  •  Flovent HFA 44 MCG/ACT inhaler, , Disp: , Rfl:   •  fluticasone (FLONASE) 50 MCG/ACT nasal spray, , Disp: , Rfl:   •  ketorolac (TORADOL) 10 MG tablet, Take 1 tablet by mouth Every 6 (Six) Hours As Needed for Moderate Pain ., Disp: 16 tablet, Rfl: 0  •  lisinopril (PRINIVIL,ZESTRIL) 30 MG tablet, Take 1 tablet by mouth Daily., Disp: 30 tablet, Rfl: 5  •  loratadine (CLARITIN) 10 MG tablet, TAKE 1 TABLET BY MOUTH DAILY AS NEEDED FOR ALLERGIES., Disp: 30 tablet, Rfl: 5  •  methocarbamol (ROBAXIN) 750 MG tablet, TAKE 1 TABLET BY MOUTH 2 (TWO) TIMES A DAY AS NEEDED FOR MUSCLE SPASMS., Disp: 60 tablet, Rfl: 5  •  mirtazapine (REMERON) 30 MG tablet, Take 1.5 tablets by mouth Every Night., Disp: 45 tablet, Rfl: 5  •  mometasone (NASONEX) 50 MCG/ACT nasal spray, , Disp: , Rfl:   •  montelukast (SINGULAIR) 10 MG tablet, Take 10 mg by mouth., Disp: , Rfl:   •  mupirocin  "(BACTROBAN) 2 % ointment, Apply  topically to the appropriate area as directed 3 (Three) Times a Day., Disp: 30 g, Rfl: 2  •  potassium chloride (K-DUR,KLOR-CON) 10 MEQ CR tablet, TAKE 1 TABLET BY MOUTH DAILY., Disp: 30 tablet, Rfl: 5  •  tamsulosin (FLOMAX) 0.4 MG capsule 24 hr capsule, TAKE 1 CAPSULE BY MOUTH DAILY., Disp: 30 capsule, Rfl: 5  •  temazepam (Restoril) 15 MG capsule, Take 1 capsule by mouth At Night As Needed for Sleep., Disp: 30 capsule, Rfl: 2  •  vitamin D (ERGOCALCIFEROL) 1.25 MG (73898 UT) capsule capsule, Take 1 capsule by mouth Every 7 (Seven) Days., Disp: 4 capsule, Rfl: 05      Allergies   Allergen Reactions   • Ciprofloxacin Anaphylaxis and Hives   • Miralax [Polyethylene Glycol] Itching and Rash   • Mobic [Meloxicam] Other (See Comments)     Pt states, \"It make my feet and hands go numb and I can't hardly walk.\"    • Paxil [Paroxetine Hcl] Shortness Of Breath     Chest pain    • Peanut-Containing Drug Products Anaphylaxis   • Penicillins Anaphylaxis   • Pristiq [Desvenlafaxine Succinate Er] Dizziness   • Sulfa Antibiotics Anaphylaxis, Itching and Rash   • Doxycycline Hives   • Fish-Derived Products Hives   • Isosorbide Nitrate Rash     Rash, hives, had to use inhaler.    • Movantik [Naloxegol] Rash   • Seroquel [Quetiapine] Hives and Rash   • Buspar [Buspirone] Rash   • Clarithromycin Rash   • Clindamycin/Lincomycin Rash   • Codeine Rash   • Contrast Dye Itching and Rash   • Diltiazem Rash   • Flomax [Tamsulosin] Hives   • Gabapentin Rash   • Keflex [Cephalexin] Rash   • Linzess [Linaclotide] Rash   • Metoprolol Rash   • Prednisone Rash and Other (See Comments)     Face, feet, and legs go completely numb per patient   • Robitussin Cough+ Chest Max St [Dextromethorphan-Guaifenesin] Itching   • Shrimp (Diagnostic) Rash   • Spironolactone Rash   • Viibryd [Vilazodone Hcl] Itching and Rash   • Zoloft [Sertraline Hcl] Hives and Itching       Objective     Objective   Vital Signs:  Blood " "Pressure 115/70   Pulse 77   Temperature 98.2 °F (36.8 °C) (Temporal)   Height 170.2 cm (67.01\")   Weight 108 kg (239 lb)   Oxygen Saturation 98%   Body Mass Index 37.42 kg/m²   Estimated body mass index is 37.42 kg/m² as calculated from the following:    Height as of this encounter: 170.2 cm (67.01\").    Weight as of this encounter: 108 kg (239 lb).    Class 2 Severe Obesity (BMI >=35 and <=39.9). Obesity-related health conditions include the following: hypertension and dyslipidemias. Obesity is unchanged. BMI is is above average; BMI management plan is completed. We discussed portion control and increasing exercise.    Past Medical History:   Diagnosis Date   • Allergic    • Anxiety    • Arthritis    • Asthma    • Body piercing     REPORTS CYLICONE IN EARS   • Clotting disorder (HCC) 2004    had a knee surgery   • Coronary artery disease    • Depression    • DVT (deep venous thrombosis) ([facility])     RIGHT RIGHT KNEE AFTER SURGERY YEARS AGO IN 2001 OR 2004   • Elevated cholesterol    • Gastric ulcer    • GERD (gastroesophageal reflux disease)    • H/O migraine    • Headache    • Heart attack (HCC)     REPORTS \"LIGHT HEART ATTACK A LONG TIME AGO\"  \"EARLY 90'S\"   • History of seizures     REPORTS LAST EPISODE WAS AROUND 1995.   • Hostility    • Hyperlipidemia    • Hypertension    • Knee pain, acute     Left   • Low back pain    • Lyme disease    • Migraine    • MRSA (methicillin resistant Staphylococcus aureus)     REPORTS LAST TESTED + 2004. WAS TREATED HE REPORTS.  RIGHT ARM, RIGHT KNEE.   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    • Obesity    • Poor historian    • Carl [place] spotted fever    • Seizures (HCC)    • Sleep apnea    • Tattoo    • Wears glasses      Past Surgical History:   Procedure Laterality Date   • ABDOMINAL SURGERY     • BACK SURGERY     • BRAIN SURGERY  1986    Tumor removal    • CARDIAC CATHETERIZATION N/A 9/28/2018    Procedure: Left Heart Cath;  Surgeon: Leandro Daily MD;  Location: [facility] CATH INVASIVE " LOCATION;  Service: Cardiology   • CHOLECYSTECTOMY     • COLONOSCOPY     • COLONOSCOPY N/A 8/2/2021    Procedure: COLONOSCOPY FOR SCREENING;  Surgeon: Irving Azar MD;  Location: [facility] OR;  Service: Gastroenterology;  Laterality: N/A;   • CYST REMOVAL      pilonidal cyst   • ELBOW EPICONDYLECTOMY Right 7/23/2020    Procedure: LATERAL EPICONDYLAR RELEASE;  Surgeon: Jose Ruiz MD;  Location: [facility] OR;  Service: Orthopedics;  Laterality: Right;   • ENDOSCOPY     • ENDOSCOPY N/A 8/2/2021    Procedure: ESOPHAGOGASTRODUODENOSCOPY WITH BIOPSY;  Surgeon: Irving Aazr MD;  Location: [facility] OR;  Service: Gastroenterology;  Laterality: N/A;  esophageal dilatation to 20mm   • FRACTURE SURGERY Right     elbow   • KNEE ARTHROSCOPY Left 10/20/2017    Procedure: Diagnostic arthroscopy left knee with chondroplasty;  Surgeon: Marco Aguirre MD;  Location: [facility] OR;  Service:    • KNEE ARTHROSCOPY Left 1/11/2021    Procedure: KNEE DIAGNOSTIC ARTHROSCOPY WITH  CHONDROPLASTY patella, femoral and medial;  Surgeon: Raul Eagle MD;  Location: [facility] OR;  Service: Orthopedics;  Laterality: Left;   • KNEE SURGERY Right    • MOUTH SURGERY      FULL MOUTH EXTRACTION   • OTHER SURGICAL HISTORY      REPORTS 7 TICKS REMOVED FROM RIGHT ARM IN 2001 OR 2002   • TENNIS ELBOW RELEASE Right 7/23/2020    Procedure: RIGHT TENNIS ELBOW RELEASE;  Surgeon: Jose Ruiz MD;  Location: [facility] OR;  Service: Orthopedics;  Laterality: Right;   • TUMOR EXCISION      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     Social History     Socioeconomic History   • Marital status:      Spouse name: Becca   • Number of children: 2   • Years of education: 12   Tobacco Use   • Smoking status: Current Some Day Smoker     Packs/day: 0.25     Years: 17.00     Pack years: 4.25     Types: Cigars, Cigarettes     Start date: 4/11/2022   • Smokeless tobacco: Never Used   Vaping Use   • Vaping Use: Never  used   Substance and Sexual Activity   • Alcohol use: No   • Drug use: No   • Sexual activity: Defer     Partners: Female     Birth control/protection: None      Physical Exam  Vitals and nursing note reviewed.   Constitutional:       General: He is not in acute distress.     Appearance: He is well-developed. He is not diaphoretic.   HENT:      Head: Normocephalic and atraumatic.   Eyes:      General: No scleral icterus.        Right eye: No discharge.         Left eye: No discharge.      Conjunctiva/sclera: Conjunctivae normal.   Cardiovascular:      Rate and Rhythm: Normal rate and regular rhythm.      Heart sounds: Normal heart sounds. No murmur heard.    No friction rub. No gallop.   Pulmonary:      Effort: Pulmonary effort is normal. No respiratory distress.      Breath sounds: Normal breath sounds. No wheezing or rales.   Chest:      Chest wall: No tenderness.   Abdominal:      General: Bowel sounds are normal. There is no distension.      Palpations: Abdomen is soft.      Tenderness: There is no abdominal tenderness. There is no guarding.   Musculoskeletal:         General: Normal range of motion.      Cervical back: Normal range of motion and neck supple.   Skin:     General: Skin is warm and dry.      Coloration: Skin is not pale.      Findings: No erythema or rash.   Neurological:      General: No focal deficit present.      Mental Status: He is alert and oriented to person, place, and time. Mental status is at baseline.      Sensory: No sensory deficit.      Motor: No weakness.      Gait: Gait normal.   Psychiatric:         Behavior: Behavior normal.        Result Review :  The following data was reviewed by: GREG Gracia on 09/14/2022:  Office Visit on 09/01/2022   Component Date Value Ref Range Status   • Color 09/01/2022 Yellow  Yellow, Straw, Dark Yellow, Madiha Final   • Clarity, UA 09/01/2022 Clear  Clear Final   • Specific Gravity  09/01/2022 1.030  1.005 - 1.030 Final   • pH, Urine  09/01/2022 5.5  5.0 - 8.0 Final   • Leukocytes 09/01/2022 Negative  Negative Final   • Nitrite, UA 09/01/2022 Negative  Negative Final   • Protein, POC 09/01/2022 Negative  Negative mg/dL Final   • Glucose, UA 09/01/2022 Negative  Negative mg/dL Final   • Ketones, UA 09/01/2022 Negative  Negative Final   • Urobilinogen, UA 09/01/2022 Normal  Normal, 0.2 E.U./dL Final   • Bilirubin 09/01/2022 Negative  Negative Final   • Blood, UA 09/01/2022 Negative  Negative Final   • Lot Number 09/01/2022 [id number]   Final   • Expiration Date 09/01/2022 8/24/23   Final            Assessment / Plan         Assessment   Diagnoses and all orders for this visit:    1. Seizure (HCC) (Primary), recurrent   2. Fatigue, unspecified type  3. Acute intractable headache, unspecified headache type  • Labs obtained in the office today to help rule out signs of infection, check electrolytes, as well as check Dilantin level.  • Obtain urine toxicology screen.  • Obtain UA with culture if indicated.  • Given complaints of persistent headache with history of brain tumor and recurrent seizures, stat CT head without contrast was ordered.  It does require a prior authorization.  This has been submitted.  • He reports previously following with neurology, but has not seen them in some time. Will consider repeat referral after work up.   -     Phenytoin level, total  -     CBC w AUTO Differential  -     Comprehensive metabolic panel  -     Magnesium  -     TSH  -     POCT urinalysis dipstick, automated  -     CT Head Without Contrast; Future  -     Urine Drug Screen - Urine, Clean Catch     Follow Up   Return in about 2 weeks (around 9/28/2022), or W/ Sia.    Patient was given instructions and counseling regarding his condition or for health maintenance advice. Please see specific information pulled into the AVS if appropriate.       This document has been electronically signed by GREG Gracia   September 14, 2022 13:26 EDT    Dictated  Utilizing Dragon Dictation: Part of this note may be an electronic transcription/translation of spoken language to printed text using the Dragon Dictation System.

## 2022-09-16 DIAGNOSIS — F51.04 PSYCHOPHYSIOLOGICAL INSOMNIA: Primary | ICD-10-CM

## 2022-09-16 LAB
AMPHETAMINES UR QL SCN: NEGATIVE NG/ML
BARBITURATES UR QL SCN: NEGATIVE NG/ML
BENZODIAZ UR QL SCN: NEGATIVE NG/ML
BZE UR QL SCN: NEGATIVE NG/ML
CANNABINOIDS UR QL SCN: NEGATIVE NG/ML
CREAT UR-MCNC: 132.1 MG/DL (ref 20–300)
LABORATORY COMMENT REPORT: NORMAL
METHADONE UR QL SCN: NEGATIVE NG/ML
OPIATES UR QL SCN: NEGATIVE NG/ML
OXYCODONE+OXYMORPHONE UR QL SCN: NEGATIVE NG/ML
PCP UR QL: NEGATIVE NG/ML
PH UR: 5.2 [PH] (ref 4.5–8.9)
PHENYTOIN SERPL-MCNC: 18.8 UG/ML (ref 10–20)
PROPOXYPH UR QL SCN: NEGATIVE NG/ML

## 2022-09-20 ENCOUNTER — TRANSCRIBE ORDERS (OUTPATIENT)
Dept: PHYSICAL THERAPY | Facility: CLINIC | Age: 52
End: 2022-09-20

## 2022-09-20 ENCOUNTER — TREATMENT (OUTPATIENT)
Dept: PHYSICAL THERAPY | Facility: CLINIC | Age: 52
End: 2022-09-20

## 2022-09-20 DIAGNOSIS — M25.622 DECREASED ROM OF LEFT ELBOW: ICD-10-CM

## 2022-09-20 DIAGNOSIS — M25.522 LEFT ELBOW PAIN: Primary | ICD-10-CM

## 2022-09-20 PROCEDURE — 97162 PT EVAL MOD COMPLEX 30 MIN: CPT | Performed by: PHYSICAL THERAPIST

## 2022-09-20 NOTE — PROGRESS NOTES
Physical Therapy Initial Evaluation and Plan of Care    Patient: Jaquan Mckay   : 1970  Diagnosis/ICD-10 Code:  Left elbow pain [M25.522]  Referring practitioner: Ronald S Dubin, MD  Date of Initial Visit: 2022  Today's Date: 2022  Patient seen for 1 session         Visit Diagnoses:    ICD-10-CM ICD-9-CM   1. Left elbow pain  M25.522 719.42   2. Decreased ROM of left elbow  M25.622 719.52         Subjective Questionnaire: QuickDASH: 82%      Subjective Evaluation    History of Present Illness  Onset date: 5 months.  Mechanism of injury: The patient has suffered from left elbow pain for the past five months.  The patient has left hand weakness and has pain in the elbow with left grasping.  Pt has difficulty with driving.  The patient recently was evaluated by MD Dubin and was referred to therapy for treatment of pain.  The patient was given a steroid shot with no relief of pain.       Patient Occupation: disabled Quality of life: good    Pain  Current pain ratin  At best pain ratin  At worst pain rating: 10  Location: left elbow pain  Quality: sharp  Relieving factors: ice, heat, medications and change in position  Aggravating factors: keyboarding, lifting, movement, overhead activity, prolonged positioning and sleeping  Progression: worsening    Hand dominance: right    Patient Goals  Patient goals for therapy: decreased edema, decreased pain, improved balance, increased motion, increased strength, independence with ADLs/IADLs and return to sport/leisure activities             Objective          Observations     Additional Elbow Observation Details  Heat noted to left elbow; edema noted to left elbow    Palpation   Left   Tenderness of the pronator teres, supinator, triceps, wrist extensors and wrist flexors.     Tenderness     Left Elbow   Tenderness in the lateral epicondyle, medial epicondyle, olecranon process and radial head.     Left Wrist/Hand   Tenderness in the lateral  epicondyle, medial epicondyle and olecranon process.     Neurological Testing     Sensation     Elbow   Left Elbow   Intact: light touch    Right Elbow   Intact: light touch    Active Range of Motion     Left Elbow   Flexion: 70 degrees   Forearm supination: 55 degrees   Forearm pronation: 75 degrees     Right Elbow   Flexion: 135 degrees   Extension: 0 degrees   Forearm supination: WFL  Forearm pronation: WFL    Left Wrist   Wrist flexion: 45 degrees   Wrist extension: 60 degrees     Right Wrist   Wrist flexion: 80 degrees   Wrist extension: 85 degrees     Additional Active Range of Motion Details  Left elbow lack 14 degrees from neutral    Strength/Myotome Testing     Left Wrist/Hand      (2nd hand position)     Trial 1: 15 lbs    Trial 2: 15 lbs    Trial 3: 15 lbs    Average: 15 lbs    Right Wrist/Hand      (2nd hand position)     Trial 1: 95 lbs    Trial 2: 100 lbs    Trial 3: 105 lbs    Average: 100 lbs    Additional Strength Details  Left MMT deferred due to increased pain and guarding          Assessment & Plan     Assessment  Impairments: abnormal muscle firing, abnormal or restricted ROM, activity intolerance, impaired physical strength, lacks appropriate home exercise program and pain with function  Functional Limitations: carrying objects, lifting, sleeping, pulling, pushing, uncomfortable because of pain, reaching overhead and unable to perform repetitive tasks  Assessment details: Pt is a 50 y/o male referred to therapy for treatment of left elbow pain.  Pt has tenderness along the left wrist extensors, decreased elbow and wrist ROM and noted  weakness.  Therapy will follow for improved mobility with decreased pain.  Prognosis: good    Goals  Plan Goals: STG 6 weeks    1 Pt will be instructed in a HEP.  2 Pt will improve his  strength to 30# to improve opening jars.  3 Pt will improve his Quick Dash to less than 50%.    LTG 12 weeks    1 Pt will improve his left elbow ROM to at least  5-100 degrees.  2 Pt will improve his left elbow gross strength to 4/5.  3 Pt will improve his  strength to 70#.  4 Pt will report pain no greater than 5/10 with ADLs.  5 Pt will improve his Quick Dash to less than 30%.    Plan  Therapy options: will be seen for skilled therapy services  Planned modality interventions: TENS, thermotherapy (hydrocollator packs), ultrasound, thermotherapy (paraffin bath) and cryotherapy  Planned therapy interventions: ADL retraining, flexibility, functional ROM exercises, home exercise program, IADL retraining, joint mobilization, manual therapy, neuromuscular re-education, postural training, soft tissue mobilization, strengthening, stretching, therapeutic activities and motor coordination training  Frequency: 2x week  Duration in weeks: 12  Treatment plan discussed with: patient  Plan details: Will follow for optimal gains.    Moderate Evaluation  33577    Re-evaluation   87407      Therapeutic exercise  74168  Therapeutic activity    69383  Neuromuscular re-education   98525  Manual therapy   62331    Unattended e-stim (Medicaid/Medicare)     Moist heat/cryotherapy 05877   Ultrasound   50772              Timed:         Manual Therapy:         mins  15729;     Therapeutic Exercise:         mins  90023;     Neuromuscular Jazmin:        mins  26929;    Therapeutic Activity:          mins  14360;     Gait Training:           mins  38293;     Ultrasound:          mins  68732;    Ionto                                   mins   13853  Self Care                            mins   47682  Canalith Repos         mins 82986      Un-Timed:  Electrical Stimulation:         mins  18950 ( );  Dry Needling          mins self-pay  Traction          mins 15088  Low Eval          Mins  17471  Mod Eval          Mins  86200  High Eval                            Mins  68130        Timed Treatment:      mins   Total Treatment:     40   mins          PT: Win Rodriguez, PT     License  Number: [id number]  Electronically signed by Win Rodriguez PT, 09/20/22, 1:00 PM EDT    Certification Period: 9/20/2022 thru 12/18/2022  I certify that the therapy services are furnished while this patient is under my care.  The services outlined above are required by this patient, and will be reviewed every 90 days.         Physician Signature:__________________________________________________    PHYSICIAN: Dubin, Ronald S, MD  NPI: [id number]                                      DATE:      Please sign and return via fax to [email or web address] . Thank you, [facility].

## 2022-09-23 ENCOUNTER — TREATMENT (OUTPATIENT)
Dept: PHYSICAL THERAPY | Facility: CLINIC | Age: 52
End: 2022-09-23

## 2022-09-23 DIAGNOSIS — M25.522 LEFT ELBOW PAIN: Primary | ICD-10-CM

## 2022-09-23 DIAGNOSIS — M25.622 DECREASED ROM OF LEFT ELBOW: ICD-10-CM

## 2022-09-23 PROCEDURE — 97140 MANUAL THERAPY 1/> REGIONS: CPT | Performed by: PHYSICAL THERAPIST

## 2022-09-23 PROCEDURE — 97035 APP MDLTY 1+ULTRASOUND EA 15: CPT | Performed by: PHYSICAL THERAPIST

## 2022-09-23 PROCEDURE — 97110 THERAPEUTIC EXERCISES: CPT | Performed by: PHYSICAL THERAPIST

## 2022-09-23 NOTE — PROGRESS NOTES
Physical Therapy Daily Treatment Note      Patient: Jaquan Mckay   : 1970  Referring practitioner: Ronald S Dubin, MD  Date of Initial Visit: Type: THERAPY  Noted: 2022  Today's Date: 2022  Patient seen for 2 sessions       Visit Diagnoses:    ICD-10-CM ICD-9-CM   1. Left elbow pain  M25.522 719.42   2. Decreased ROM of left elbow  M25.622 719.52       Subjective Evaluation    History of Present Illness    Subjective comment: Pt reports having 8/10 pain today.       Objective   See Exercise, Manual, and Modality Logs for complete treatment.       Assessment & Plan     Assessment    Assessment details: Tx today initiated with mh to left elbow; exercises for improved mobility and stability; stm and retrograde for improved elbow circulation and mobility and ended with US for decreased edema.  Pt demonstrated good effort yet reported mild pain with most activities today.  Pt reported 6/10 post tx.    Plan  Plan details: Will follow progressing functional use of LEFT UE and decreased elbow pain.          Timed:         Manual Therapy:    14     mins  44162;     Therapeutic Exercise:    17     mins  80075;     Neuromuscular Jazmin:        mins  45024;    Therapeutic Activity:          mins  02749;     Gait Training:           mins  13753;     Ultrasound:     8     mins  96067;    Ionto                                   mins   56026  Self Care                            mins   88428  Canalith Repos         mins 74686      Un-Timed:  Electrical Stimulation:         mins  91630 ( );  Dry Needling          mins self-pay  Traction          mins 09274      Timed Treatment:   39   mins   Total Treatment:     45   Mins(6 min ice)    Win Rodriguez PT  [place] License: [id number]      Electronically signed by Win Rodriguez PT, 22, 1:48 PM EDT

## 2022-09-25 ENCOUNTER — APPOINTMENT (OUTPATIENT)
Dept: GENERAL RADIOLOGY | Facility: HOSPITAL | Age: 52
End: 2022-09-25

## 2022-09-25 ENCOUNTER — HOSPITAL ENCOUNTER (EMERGENCY)
Facility: HOSPITAL | Age: 52
Discharge: HOME | End: 2022-09-26
Attending: STUDENT IN AN ORGANIZED HEALTH CARE EDUCATION/TRAINING PROGRAM

## 2022-09-25 DIAGNOSIS — T14.8XXA ABRASION: ICD-10-CM
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
## 2022-09-25 DIAGNOSIS — G44.011 INTRACTABLE EPISODIC CLUSTER HEADACHE: ICD-10-CM

## 2022-09-25 PROCEDURE — 99283 EMERGENCY DEPT VISIT LOW MDM: CPT

## 2022-09-25 PROCEDURE — 73130 X-RAY EXAM OF HAND: CPT

## 2022-09-25 PROCEDURE — 73090 X-RAY EXAM OF FOREARM: CPT

## 2022-09-25 PROCEDURE — 73630 X-RAY EXAM OF FOOT: CPT

## 2022-09-25 PROCEDURE — 73080 X-RAY EXAM OF ELBOW: CPT

## 2022-09-26 ENCOUNTER — APPOINTMENT (OUTPATIENT)
Dept: GENERAL RADIOLOGY | Facility: HOSPITAL | Age: 52
End: 2022-09-26

## 2022-09-26 VITALS
TEMPERATURE: 98 F | HEIGHT: 67 IN | BODY MASS INDEX: 37.67 KG/M2 | DIASTOLIC BLOOD PRESSURE: 87 MMHG | RESPIRATION RATE: 18 BRPM | WEIGHT: 240 LBS | HEART RATE: 86 BPM | OXYGEN SATURATION: 97 % | SYSTOLIC BLOOD PRESSURE: 139 MMHG

## 2022-09-26 PROCEDURE — 73060 X-RAY EXAM OF HUMERUS: CPT

## 2022-09-26 PROCEDURE — 73610 X-RAY EXAM OF ANKLE: CPT

## 2022-09-26 RX ORDER — BUTALBITAL, ACETAMINOPHEN AND CAFFEINE 50; 325; 40 MG/1; MG/1; MG/1
1 TABLET ORAL ONCE
Status: COMPLETED | OUTPATIENT
Start: 2022-09-26 | End: 2022-09-26

## 2022-09-26 RX ADMIN — BUTALBITAL, ACETAMINOPHEN, AND CAFFEINE 1 TABLET: 50; 325; 40 TABLET ORAL at 00:35

## 2022-09-28 ENCOUNTER — TREATMENT (OUTPATIENT)
Dept: PHYSICAL THERAPY | Facility: CLINIC | Age: 52
End: 2022-09-28

## 2022-09-28 ENCOUNTER — OFFICE VISIT (OUTPATIENT)
Dept: CARDIOLOGY | Facility: CLINIC | Age: 52
End: 2022-09-28

## 2022-09-28 VITALS
DIASTOLIC BLOOD PRESSURE: 73 MMHG | BODY MASS INDEX: 37.84 KG/M2 | SYSTOLIC BLOOD PRESSURE: 121 MMHG | HEART RATE: 70 BPM | WEIGHT: 241.6 LBS

## 2022-09-28 DIAGNOSIS — I10 ESSENTIAL HYPERTENSION: Primary | ICD-10-CM

## 2022-09-28 DIAGNOSIS — M25.522 LEFT ELBOW PAIN: Primary | ICD-10-CM

## 2022-09-28 DIAGNOSIS — R55 SYNCOPE AND COLLAPSE: ICD-10-CM

## 2022-09-28 DIAGNOSIS — M25.622 DECREASED ROM OF LEFT ELBOW: ICD-10-CM

## 2022-09-28 PROCEDURE — 99213 OFFICE O/P EST LOW 20 MIN: CPT | Performed by: NURSE PRACTITIONER

## 2022-09-28 PROCEDURE — 97110 THERAPEUTIC EXERCISES: CPT | Performed by: PHYSICAL THERAPIST

## 2022-09-28 NOTE — PROGRESS NOTES
Tiera Valenzuela APRN  Jaquan Mckay  1970 09/28/2022    Patient Active Problem List   Diagnosis   • Gastroesophageal reflux disease without esophagitis   • Essential hypertension   • Seizures ([facility])   • Hyperlipidemia   • Anxiety   • Varicocele present on ultrasound of scrotum   • Chronic bilateral low back pain with left-sided sciatica   • Seasonal allergic rhinitis due to pollen   • Mild persistent asthma without complication   • Precordial pain   • Congenital coronary artery anomaly   • BMI 35.0-35.9,adult   • Chondromalacia, knee   • Achilles tendinitis of both lower extremities   • Muscle spasm of both lower legs   • Personal history of allergy to shellfish   • Sensation of cold in lower extremity   • Varicose vein of leg   • Heart palpitations   • Generalized anxiety disorder   • Chronic elbow pain, right   • Unstable angina ([facility])   • ASCVD (arteriosclerotic cardiovascular disease)   • Other constipation   • Class 2 severe obesity due to excess calories with serious comorbidity and body mass index (BMI) of 36.0 to 36.9 in adult (HCC)   • Bacteremia   • Therapeutic opioid-induced constipation (OIC)   • Rectal bleeding   • Bloating   • History of colon polyps   • Lateral epicondylitis of right elbow   • Psychophysiological insomnia   • Chronic rhinitis   • Vitamin D deficiency   • Renal calculus   • Chronic idiopathic constipation   • Diarrhea   • Bilateral flank pain   • BPH without obstruction/lower urinary tract symptoms   • Incomplete bladder emptying       Dear Tiera Valenzuela APRN:    Subjective     Chief Complaint   Patient presents with   • Follow-up     ROUTINE           History of Present Illness:    Jaquan Mckay is a 51 y.o. male with a past medical history of hypertension, dyslipidemia, and chronic chest pains with normal coronary arteries noted on left heart catheterization in 2018. He presents today for cardiology follow up.  Recently, a 14-day Holter monitor was ordered.  However, the  "patient did only wear the monitor for 4 days and 18 hours.  This revealed predominantly sinus rhythm with heart rate ranging from 52 to 104 bpm.  There were occasional PACs with a short 4 beat run of SVT. There were no significant bradyarrhythmias, AV block or long pauses noted.  He also had an overnight pulse oximetry which was normal.  He reports he is following with his PCP for a recent seizure. Denies any syncopal episodes or chest pains.           Allergies   Allergen Reactions   • Ciprofloxacin Anaphylaxis and Hives   • Miralax [Polyethylene Glycol] Itching and Rash   • Mobic [Meloxicam] Other (See Comments)     Pt states, \"It make my feet and hands go numb and I can't hardly walk.\"    • Paxil [Paroxetine Hcl] Shortness Of Breath     Chest pain    • Peanut-Containing Drug Products Anaphylaxis   • Penicillins Anaphylaxis   • Pristiq [Desvenlafaxine Succinate Er] Dizziness   • Sulfa Antibiotics Anaphylaxis, Itching and Rash   • Doxycycline Hives   • Fish-Derived Products Hives   • Isosorbide Nitrate Rash     Rash, hives, had to use inhaler.    • Movantik [Naloxegol] Rash   • Seroquel [Quetiapine] Hives and Rash   • Buspar [Buspirone] Rash   • Clarithromycin Rash   • Clindamycin/Lincomycin Rash   • Codeine Rash   • Contrast Dye Itching and Rash   • Diltiazem Rash   • Flomax [Tamsulosin] Hives   • Gabapentin Rash   • Keflex [Cephalexin] Rash   • Linzess [Linaclotide] Rash   • Metoprolol Rash   • Prednisone Rash and Other (See Comments)     Face, feet, and legs go completely numb per patient   • Robitussin Cough+ Chest Max St [Dextromethorphan-Guaifenesin] Itching   • Shrimp (Diagnostic) Rash   • Spironolactone Rash   • Viibryd [Vilazodone Hcl] Itching and Rash   • Zoloft [Sertraline Hcl] Hives and Itching   :      Current Outpatient Medications:   •  albuterol sulfate  (90 Base) MCG/ACT inhaler, , Disp: , Rfl:   •  aspirin (aspirin) 81 MG EC tablet, Take 1 tablet by mouth Daily., Disp: 30 tablet, Rfl: 5  •  " dexlansoprazole (Dexilant) 60 MG capsule, Take 1 capsule by mouth Daily., Disp: 30 capsule, Rfl: 5  •  Dilantin 100 MG capsule, Take 2 capsules by mouth 2 (Two) Times a Day., Disp: 120 capsule, Rfl: 5  •  diphenhydrAMINE (Benadryl Allergy) 25 MG tablet, Take 1-2 tablets by mouth Every 8 (Eight) Hours As Needed for Itching (or rash)., Disp: 120 tablet, Rfl: 2  •  Elastic Bandages & Supports (ACE Ankle Brace) misc, 1 each Daily., Disp: 1 each, Rfl: 0  •  famotidine (Pepcid) 40 MG tablet, Take 1 tablet by mouth 2 (Two) Times a Day., Disp: 60 tablet, Rfl: 5  •  Flovent HFA 44 MCG/ACT inhaler, , Disp: , Rfl:   •  lisinopril (PRINIVIL,ZESTRIL) 30 MG tablet, Take 1 tablet by mouth Daily., Disp: 30 tablet, Rfl: 5  •  loratadine (CLARITIN) 10 MG tablet, TAKE 1 TABLET BY MOUTH DAILY AS NEEDED FOR ALLERGIES., Disp: 30 tablet, Rfl: 5  •  methocarbamol (ROBAXIN) 750 MG tablet, TAKE 1 TABLET BY MOUTH 2 (TWO) TIMES A DAY AS NEEDED FOR MUSCLE SPASMS., Disp: 60 tablet, Rfl: 5  •  mirtazapine (REMERON) 30 MG tablet, Take 1.5 tablets by mouth Every Night., Disp: 45 tablet, Rfl: 5  •  montelukast (SINGULAIR) 10 MG tablet, Take 10 mg by mouth., Disp: , Rfl:   •  mupirocin (BACTROBAN) 2 % ointment, Apply  topically to the appropriate area as directed 3 (Three) Times a Day., Disp: 30 g, Rfl: 2  •  potassium chloride (K-DUR,KLOR-CON) 10 MEQ CR tablet, TAKE 1 TABLET BY MOUTH DAILY., Disp: 30 tablet, Rfl: 5  •  tamsulosin (FLOMAX) 0.4 MG capsule 24 hr capsule, TAKE 1 CAPSULE BY MOUTH DAILY., Disp: 30 capsule, Rfl: 5  •  temazepam (Restoril) 15 MG capsule, Take 1 capsule by mouth At Night As Needed for Sleep., Disp: 30 capsule, Rfl: 2  •  vitamin D (ERGOCALCIFEROL) 1.25 MG (03062 UT) capsule capsule, Take 1 capsule by mouth Every 7 (Seven) Days., Disp: 4 capsule, Rfl: 05  •  azelastine (ASTELIN) 0.1 % nasal spray, , Disp: , Rfl:   •  azithromycin (Zithromax Z-Clark) 250 MG tablet, Take 2 tablets by mouth on day 1, then 1 tablet daily on days  2-5, Disp: 6 tablet, Rfl: 0  •  cholestyramine (QUESTRAN) 4 GM/DOSE powder, Take 1 packet by mouth 2 (Two) Times a Day. mixed with a liquid, Disp: 378 g, Rfl: 3  •  clobetasol (TEMOVATE) 0.05 % ointment, Apply 1 application topically to the appropriate area as directed 2 (Two) Times a Day., Disp: 60 g, Rfl: 2  •  dicyclomine (Bentyl) 10 MG capsule, Take 1 capsule by mouth 4 (Four) Times a Day Before Meals & at Bedtime., Disp: 120 capsule, Rfl: 11  •  fluticasone (FLONASE) 50 MCG/ACT nasal spray, , Disp: , Rfl:   •  ketorolac (TORADOL) 10 MG tablet, Take 1 tablet by mouth Every 6 (Six) Hours As Needed for Moderate Pain ., Disp: 16 tablet, Rfl: 0  •  mometasone (NASONEX) 50 MCG/ACT nasal spray, , Disp: , Rfl:       The following portions of the patient's history were reviewed and updated as appropriate: allergies, current medications, past family history, past medical history, past social history, past surgical history and problem list.    Social History     Tobacco Use   • Smoking status: Current Some Day Smoker     Packs/day: 0.25     Years: 17.00     Pack years: 4.25     Types: Cigars, Cigarettes     Start date: 4/11/2022   • Smokeless tobacco: Never Used   Vaping Use   • Vaping Use: Never used   Substance Use Topics   • Alcohol use: No   • Drug use: No       Review of Systems   Constitutional: Negative for decreased appetite and malaise/fatigue.   Cardiovascular: Negative for chest pain, dyspnea on exertion and palpitations.   Respiratory: Negative for cough and shortness of breath.        Objective   Vitals:    09/28/22 1308   BP: 121/73   Pulse: 70   Weight: 110 kg (241 lb 9.6 oz)     Body mass index is 37.84 kg/m².        Vitals reviewed.   Constitutional:       Appearance: Healthy appearance. Well-developed and not in distress.   HENT:      Head: Normocephalic and atraumatic.   Pulmonary:      Effort: Pulmonary effort is normal.      Breath sounds: Normal breath sounds. No wheezing. No rales.   Cardiovascular:       Normal rate. Regular rhythm.      Murmurs: There is no murmur.      . No S3 and S4 gallop.   Edema:     Peripheral edema absent.   Abdominal:      General: Bowel sounds are normal.      Palpations: Abdomen is soft.   Skin:     General: Skin is warm and dry.   Neurological:      Mental Status: Alert, oriented to person, place, and time and oriented to person, place and time.   Psychiatric:         Mood and Affect: Mood normal.         Behavior: Behavior normal.         Lab Results   Component Value Date     09/14/2022    K 4.5 09/14/2022     09/14/2022    CO2 26.3 09/14/2022    BUN 17 09/14/2022    CREATININE 1.29 (H) 09/14/2022    GLUCOSE 99 09/14/2022    CALCIUM 9.6 09/14/2022    AST 23 09/14/2022    ALT 20 09/14/2022    ALKPHOS 99 09/14/2022    LABIL2 1.1 (L) 05/29/2016     Lab Results   Component Value Date    CKTOTAL 153 06/20/2019     Lab Results   Component Value Date    WBC 5.28 09/14/2022    HGB 16.6 09/14/2022    HCT 48.2 09/14/2022     09/14/2022     Lab Results   Component Value Date    INR 0.98 09/24/2019    INR 1.03 02/06/2018    INR 0.97 09/26/2017     Lab Results   Component Value Date    MG 1.8 09/14/2022     Lab Results   Component Value Date    TSH 3.070 09/14/2022    PSA 1.0 07/01/2021    CHLPL 232 (H) 04/05/2016    TRIG 95 05/05/2022    HDL 71 (H) 05/05/2022     (H) 05/05/2022      Lab Results   Component Value Date    BNP 3.0 09/26/2018           Procedures      Assessment & Plan    Diagnosis Plan   1. Essential hypertension     2. Dyslipidemia                  Recommendations:    1. Essential hypertension - well controlled.  2. Syncope - no recurrence. Has had recent seizure activity and is following with PCP. We discussed results of the overnight pulse oximeter and holter monitor.  3. Follow up in 6 months or sooner if needed.         Return in about 6 months (around 3/28/2023) for Recheck.    As always, I appreciate very much the opportunity to participate in  the cardiovascular care of your patients.      With Best Regards,    BALDOMERO Horowitz

## 2022-09-28 NOTE — PROGRESS NOTES
Physical Therapy Daily Treatment Note      Patient: Jaquan Mckay   : 1970  Referring practitioner: Ronald S Dubin, MD  Date of Initial Visit: Type: THERAPY  Noted: 2022  Today's Date: 2022  Patient seen for 3 sessions       Visit Diagnoses:    ICD-10-CM ICD-9-CM   1. Left elbow pain  M25.522 719.42   2. Decreased ROM of left elbow  M25.622 719.52       Subjective:  Patient arrives to therapy w/ reports of 10 left elbow pain. Pt states he had a fall at home on 22, as he was attempting to step up on the sidewalk. Pt reports he did not step high enough and caught his toe which caused him to fall straight forward. Pt reports he went to the ER and had multiple x rays done, including the elbow and left upper extremity with no fractures present. Pt wishes to attempt session today.     Objective   See Exercise, Manual, and Modality Logs for complete treatment.       Assessment/Plan:  Supervising therapist Win Rodriguez, PT notified and aware of pt's subjective reports. PT assessed and gave clearance for patient to continue with conservative treatment, holding manual rx secondary to bruising noted on the front of arm just below shoulder shoulder. Pt educated to continue to monitor symptoms and perform therapy to his tolerance; pt verbalized understanding. Treatment consisted of therex as listed with focus on improved left elbow/ upper extremity range of motion, and  strength. Progression of exercise held secondary to soreness from recent fall. Pulsed US performed at conclusion; pt denied cryotherapy. No signs of distress or adverse reactions observed during, and/ or following tx. Pt continues to benefit from therapy services, and will be progressed as tolerated. Will continue to monitor pt's symptoms. Continue w/ PT's POC.       Timed:         Manual Therapy:         mins  68659;     Therapeutic Exercise:    27     mins  13216;     Neuromuscular Jazmin:        mins  54007;    Therapeutic  Activity:          mins  69368;     Gait Training:           mins  57157;     Ultrasound:    8      mins  33295;    Ionto                                   mins   12671  Self Care                            mins   49271  Canalith Repos         mins 75187      Un-Timed:  Electrical Stimulation:         mins  95796 ( );  Dry Needling          mins self-pay  Traction          mins 68178      Timed Treatment:  35    mins   Total Treatment:    35   mins    Leighann Brown. NEIL [place]  [place] License: [id number]

## 2022-09-29 ENCOUNTER — OFFICE VISIT (OUTPATIENT)
Dept: FAMILY MEDICINE CLINIC | Facility: CLINIC | Age: 52
End: 2022-09-29

## 2022-09-29 VITALS
SYSTOLIC BLOOD PRESSURE: 120 MMHG | TEMPERATURE: 97.1 F | WEIGHT: 242 LBS | BODY MASS INDEX: 37.98 KG/M2 | OXYGEN SATURATION: 98 % | HEART RATE: 87 BPM | HEIGHT: 67 IN | DIASTOLIC BLOOD PRESSURE: 70 MMHG

## 2022-09-29 DIAGNOSIS — M25.512 ACUTE PAIN OF LEFT SHOULDER: ICD-10-CM
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
## 2022-09-29 DIAGNOSIS — R56.9 SEIZURE: ICD-10-CM

## 2022-09-29 DIAGNOSIS — I10 ESSENTIAL HYPERTENSION: ICD-10-CM

## 2022-09-29 DIAGNOSIS — M79.672 LEFT FOOT PAIN: ICD-10-CM

## 2022-09-29 PROCEDURE — 99213 OFFICE O/P EST LOW 20 MIN: CPT | Performed by: PHYSICIAN ASSISTANT

## 2022-09-30 ENCOUNTER — TREATMENT (OUTPATIENT)
Dept: PHYSICAL THERAPY | Facility: CLINIC | Age: 52
End: 2022-09-30

## 2022-09-30 DIAGNOSIS — M25.522 LEFT ELBOW PAIN: Primary | ICD-10-CM

## 2022-09-30 DIAGNOSIS — M25.622 DECREASED ROM OF LEFT ELBOW: ICD-10-CM

## 2022-09-30 PROCEDURE — 97110 THERAPEUTIC EXERCISES: CPT | Performed by: PHYSICAL THERAPIST

## 2022-09-30 PROCEDURE — 97140 MANUAL THERAPY 1/> REGIONS: CPT | Performed by: PHYSICAL THERAPIST

## 2022-09-30 RX ORDER — ASPIRIN 81 MG/1
81 TABLET ORAL DAILY
Qty: 30 TABLET | Refills: 5 | Status: SHIPPED | OUTPATIENT
Start: 2022-09-30

## 2022-09-30 RX ORDER — ACETAMINOPHEN 500 MG
500 TABLET ORAL EVERY 6 HOURS PRN
Qty: 30 TABLET | Refills: 2 | Status: SHIPPED | OUTPATIENT
Start: 2022-09-30

## 2022-09-30 NOTE — PROGRESS NOTES
Physical Therapy Daily Treatment Note      Patient: Jaquan Mckay   : 1970  Referring practitioner: Ronald S Dubin, MD  Date of Initial Visit: Type: THERAPY  Noted: 2022  Today's Date: 2022  Patient seen for 4 sessions       Visit Diagnoses:    ICD-10-CM ICD-9-CM   1. Left elbow pain  M25.522 719.42   2. Decreased ROM of left elbow  M25.622 719.52       Subjective: Patient arrives to therapy with reports of 8/10 left elbow pain. Pt states he was seen by his PCP yesterday regarding continued foot pain from his fall. Pt reports they also recommended him to use heat and ice on the elbow and continue with physical therapy.      Objective   See Exercise, Manual, and Modality Logs for complete treatment.       Assessment/Plan:  Supervising therapist, Win Rodriguez, PT present prior to initiation of treatment. PT gave ok for PTA to resume manual therapy. Pt received MH to left elbow f/b manual STM to assist with pain control and improve mobility f/b therex as listed and modalities following. Progression of therex limited secondary to patient's continued complaints of increased soreness. Pt was able to initiated active pronation/ supination range of motion, and was noted with good tolerance. Pt provided with cues and demonstration for form, and for max benefit. Pulsed US and cryotherapy performed at conclusion. No signs of distress or adverse reactions observed during, and/or following tx. Pt continues to benefit from therapy services, and will be progressed as tolerated to address goals, reduce pain, and restore range of motion. Continue w/ PT's POC.     Conclusion of tx assisted by Hanna Burks, PT, DPT    Timed:         Manual Therapy:    13     mins  10800;     Therapeutic Exercise:    23     mins  69164;     Neuromuscular Jazmin:        mins  30844;    Therapeutic Activity:          mins  13833;     Gait Training:           mins  88713;     Ultrasound:    8      mins  59339;    Ionto                                    mins   20546  Self Care                            mins   00236  Canalith Repos         mins 10842      Un-Timed:  Electrical Stimulation:         mins  74640 ( );  Dry Needling          mins self-pay  Traction          mins 40171      Timed Treatment:  44    mins   Total Treatment:     54   mins (MH/CP: x 10 min)     Leighann Brown. NEIL [place]  [place] License: [id number]

## 2022-10-05 ENCOUNTER — TREATMENT (OUTPATIENT)
Dept: PHYSICAL THERAPY | Facility: CLINIC | Age: 52
End: 2022-10-05

## 2022-10-05 DIAGNOSIS — M25.622 DECREASED ROM OF LEFT ELBOW: ICD-10-CM

## 2022-10-05 DIAGNOSIS — M25.522 LEFT ELBOW PAIN: Primary | ICD-10-CM

## 2022-10-05 PROCEDURE — 97110 THERAPEUTIC EXERCISES: CPT | Performed by: PHYSICAL THERAPIST

## 2022-10-05 PROCEDURE — 97035 APP MDLTY 1+ULTRASOUND EA 15: CPT | Performed by: PHYSICAL THERAPIST

## 2022-10-05 PROCEDURE — 97140 MANUAL THERAPY 1/> REGIONS: CPT | Performed by: PHYSICAL THERAPIST

## 2022-10-05 NOTE — PROGRESS NOTES
Physical Therapy Daily Treatment Note    Patient: Jaquan Mckay   : 1970  Diagnosis/ICD-10 Code:  Left elbow pain [M25.522]  Referring practitioner: Ronald S Dubin, MD  Date of Initial Visit: Type: THERAPY  Noted: 2022  Today's Date: 10/5/2022  Patient seen for 5 sessions            Subjective Pt arrives and reports he woke up at 5 am this morning due to his elbow hurting him.  He states he goes back to family doctor next week for more testing.  He reports he has difficulty even carrying a gallon of milk in left hand     Objective   See Exercise, Manual, and Modality Logs for complete treatment.       Assessment/Plan Pt session  Initiated with mh followed by STM and myofascial release to left forearm/bicep area avoiding bruised areas.  He also received US to left lateral epicondyle with no adverse reactions noted.  Pt then performed stretching and strengthening activities to improve left elbow strength and mobility and tolerated session well.  He requested to await bicycle next visit and received ice post session.     Progress strengthening /stabilization /functional activity             TIMED  Manual Therapy:    13     mins  24961;  Therapeutic Exercise:    20    mins  94248;     Neuromuscular Jazmin:        mins  10926;    Therapeutic Activity:          mins  69054;     Gait Training:           mins  47244;     Ultrasound:     8     mins  16229;    Attended e-stim                  mins  99915;  Iontophoresis                      mins  19612;    NON-TIMED  Electrical Stimulation:         mins  48421 ( );  Paraffin                               mins 80489                      Mechanical Traction           mins 89813  Dry Needling          mins self-pay    Timed Treatment:   41  mins   Total Treatment:     60   mins with  and ice      Electronically signed by:  Sintia Rodriguez, PT  Physical Therapist  [place] [id number]    Referring MD:  Dubin, Ronald S, MD  [address] [place] [address]  [address]  [place] [address]

## 2022-10-07 ENCOUNTER — TREATMENT (OUTPATIENT)
Dept: PHYSICAL THERAPY | Facility: CLINIC | Age: 52
End: 2022-10-07

## 2022-10-07 DIAGNOSIS — M25.522 LEFT ELBOW PAIN: Primary | ICD-10-CM

## 2022-10-07 DIAGNOSIS — M25.622 DECREASED ROM OF LEFT ELBOW: ICD-10-CM

## 2022-10-07 DIAGNOSIS — M77.12 LATERAL EPICONDYLITIS OF LEFT ELBOW: ICD-10-CM

## 2022-10-07 PROCEDURE — 97110 THERAPEUTIC EXERCISES: CPT | Performed by: PHYSICAL THERAPIST

## 2022-10-07 PROCEDURE — 97035 APP MDLTY 1+ULTRASOUND EA 15: CPT | Performed by: PHYSICAL THERAPIST

## 2022-10-07 NOTE — PROGRESS NOTES
Physical Therapy Daily Treatment Note      Patient: Jaquan Mckay   : 1970  Referring practitioner: Ronald S Dubin, MD  Date of Initial Visit: Type: THERAPY  Noted: 2022  Today's Date: 10/7/2022  Patient seen for 6 sessions       Visit Diagnoses:    ICD-10-CM ICD-9-CM   1. Left elbow pain  M25.522 719.42   2. Decreased ROM of left elbow  M25.622 719.52   3. Lateral epicondylitis of left elbow  M77.12 726.32       Subjective Evaluation    History of Present Illness    Subjective comment: Pt reports having 7/10 pain today.       Objective   See Exercise, Manual, and Modality Logs for complete treatment.       Assessment & Plan     Assessment    Assessment details: Tx today consisted of mh to elbow; followed by exercises for improved wrist and elbow mobility and stability; followed by US for decreased edema and ended with ice.  Pt demonstrated good effort with added 1# weight for wrist exercises and 4# wt for elbow flexion.  Pt reported 5/10 post pain.    Plan  Plan details: Will follow progressing wrist and elbow stability and function.          Timed:         Manual Therapy:         mins  15253;     Therapeutic Exercise:    31     mins  63411;     Neuromuscular Jazmin:        mins  70294;    Therapeutic Activity:          mins  63666;     Gait Training:           mins  76818;     Ultrasound:     8     mins  12252;    Ionto                                   mins   59941  Self Care                            mins   18916  Canalith Repos         mins 02680      Un-Timed:  Electrical Stimulation:         mins  53351 ( );  Dry Needling          mins self-pay  Traction          mins 02059      Timed Treatment:   39   mins   Total Treatment:     49   Mins(5 min ice and heat)    Win Rodriguez PT  [place] License: [id number]      Electronically signed by Win Rodriguez PT, 10/07/22, 12:58 PM EDT

## 2022-10-07 NOTE — ED PROVIDER NOTES
Subjective     History provided by:  Patient   used: No    Fall  Mechanism of injury: fall    Injury location:  Shoulder/arm, hand, foot and leg  Shoulder/arm injury location:  L arm, L elbow, L forearm and L hand  Hand injury location:  L palm  Leg injury location:  L knee  Foot injury location:  L ankle  Incident location:  Home  Time since incident:  1 hour  Arrived directly from scene: yes    Fall:     Fall occurred:  Down stairs    Height of fall:  3ft     Entrapped after fall: no    Suspicion of alcohol use: no    Suspicion of drug use: no    Tetanus status:  Up to date  Prior to arrival data:     Bystander interventions:  None    Blood loss:  None    Orientation at scene:  Person, place, situation and time    Loss of consciousness: no      Amnesic to event: no      Airway interventions:  None    Breathing interventions:  None    IV access status:  None    IO access:  None    Fluids administered:  None    Cardiac interventions:  None    Medications administered:  None    Immobilization:  None    Airway condition since incident:  Stable    Breathing condition since incident:  Stable    Circulation condition since incident:  Stable    Mental status condition since incident:  Stable    Disability condition since incident:  Stable  Associated symptoms: no abdominal pain, no back pain, no blindness, no chest pain, no difficulty breathing, no headaches, no hearing loss, no loss of consciousness, no nausea, no neck pain, no seizures and no vomiting    Risk factors: no AICD, no anticoagulation therapy, no asthma, no beta blocker therapy, no CABG, no CAD, no CHF, no COPD, no diabetes, no dialysis, no hemophilia, no kidney disease, no pacemaker, no past MI, not pregnant and no steroid use        Review of Systems   HENT: Negative for hearing loss.    Eyes: Negative for blindness.   Cardiovascular: Negative for chest pain.   Gastrointestinal: Negative for abdominal pain, nausea and vomiting.  "  Musculoskeletal: Negative for back pain and neck pain.        Left arm, left leg, left foot pain from fall    Neurological: Negative for seizures, loss of consciousness and headaches.       Past Medical History:   Diagnosis Date   • Allergic    • Anxiety    • Arthritis    • Asthma    • Body piercing     REPORTS CYLICONE IN EARS   • Clotting disorder (Formerly [facility]) 2004    had a knee surgery   • Coronary artery disease    • Depression    • DVT (deep venous thrombosis) (Formerly [facility])     RIGHT RIGHT KNEE AFTER SURGERY YEARS AGO IN 2001 OR 2004   • Elevated cholesterol    • Gastric ulcer    • GERD (gastroesophageal reflux disease)    • H/O migraine    • Headache    • Heart attack (Formerly [facility])     REPORTS \"LIGHT HEART ATTACK A LONG TIME AGO\"  \"EARLY 90'S\"   • History of seizures     REPORTS LAST EPISODE WAS AROUND 1995.   • Hostility    • Hyperlipidemia    • Hypertension    • Knee pain, acute     Left   • Low back pain    • Lyme disease    • Migraine    • MRSA (methicillin resistant Staphylococcus aureus)     REPORTS LAST TESTED + 2004. WAS TREATED HE REPORTS.  RIGHT ARM, RIGHT KNEE.   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    • Obesity    • Poor historian    • Carl [place] spotted fever    • Seizures (Former[facility])    • Sleep apnea    • Tattoo    • Wears glasses        Allergies   Allergen Reactions   • Ciprofloxacin Anaphylaxis and Hives   • Miralax [Polyethylene Glycol] Itching and Rash   • Mobic [Meloxicam] Other (See Comments)     Pt states, \"It make my feet and hands go numb and I can't hardly walk.\"    • Paxil [Paroxetine Hcl] Shortness Of Breath     Chest pain    • Peanut-Containing Drug Products Anaphylaxis   • Penicillins Anaphylaxis   • Pristiq [Desvenlafaxine Succinate Er] Dizziness   • Sulfa Antibiotics Anaphylaxis, Itching and Rash   • Doxycycline Hives   • Fish-Derived Products Hives   • Isosorbide Nitrate Rash     Rash, hives, had to use inhaler.    • Movantik [Naloxegol] Rash   • Seroquel [Quetiapine] Hives and Rash   • Buspar [Buspirone] Rash   • " Clarithromycin Rash   • Clindamycin/Lincomycin Rash   • Codeine Rash   • Contrast Dye Itching and Rash   • Diltiazem Rash   • Flomax [Tamsulosin] Hives   • Gabapentin Rash   • Keflex [Cephalexin] Rash   • Linzess [Linaclotide] Rash   • Metoprolol Rash   • Prednisone Rash and Other (See Comments)     Face, feet, and legs go completely numb per patient   • Robitussin Cough+ Chest Max St [Dextromethorphan-Guaifenesin] Itching   • Shrimp (Diagnostic) Rash   • Spironolactone Rash   • Viibryd [Vilazodone Hcl] Itching and Rash   • Zoloft [Sertraline Hcl] Hives and Itching       Past Surgical History:   Procedure Laterality Date   • ABDOMINAL SURGERY     • BACK SURGERY     • BRAIN SURGERY  1986    Tumor removal    • CARDIAC CATHETERIZATION N/A 9/28/2018    Procedure: Left Heart Cath;  Surgeon: Leandro Daily MD;  Location: [facility] CATH INVASIVE LOCATION;  Service: Cardiology   • CHOLECYSTECTOMY     • COLONOSCOPY     • COLONOSCOPY N/A 8/2/2021    Procedure: COLONOSCOPY FOR SCREENING;  Surgeon: Irving Azar MD;  Location: [facility];  Service: Gastroenterology;  Laterality: N/A;   • CYST REMOVAL      pilonidal cyst   • ELBOW EPICONDYLECTOMY Right 7/23/2020    Procedure: LATERAL EPICONDYLAR RELEASE;  Surgeon: Jose Ruiz MD;  Location: [facility] OR;  Service: Orthopedics;  Laterality: Right;   • ENDOSCOPY     • ENDOSCOPY N/A 8/2/2021    Procedure: ESOPHAGOGASTRODUODENOSCOPY WITH BIOPSY;  Surgeon: Irving Azar MD;  Location: [facility];  Service: Gastroenterology;  Laterality: N/A;  esophageal dilatation to 20mm   • FRACTURE SURGERY Right     elbow   • KNEE ARTHROSCOPY Left 10/20/2017    Procedure: Diagnostic arthroscopy left knee with chondroplasty;  Surgeon: Marco Aguirre MD;  Location: [facility] OR;  Service:    • KNEE ARTHROSCOPY Left 1/11/2021    Procedure: KNEE DIAGNOSTIC ARTHROSCOPY WITH  CHONDROPLASTY patella, femoral and medial;  Surgeon: Raul Eagle MD;  Location: [facility] OR;   Service: Orthopedics;  Laterality: Left;   • KNEE SURGERY Right    • MOUTH SURGERY      FULL MOUTH EXTRACTION   • OTHER SURGICAL HISTORY      REPORTS 7 TICKS REMOVED FROM RIGHT ARM IN 2001 OR 2002   • TENNIS ELBOW RELEASE Right 7/23/2020    Procedure: RIGHT TENNIS ELBOW RELEASE;  Surgeon: Jose Ruiz MD;  Location: [facility];  Service: Orthopedics;  Laterality: Right;   • TUMOR EXCISION      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       Family History   Problem Relation Age of Onset   • Diabetes Mother    • Hypertension Mother    • Stroke Mother    • Diabetes Father    • Skin cancer Father    • Hypertension Father    • Heart attack Father    • Diabetes Brother    • Hypertension Brother    • Heart disease Maternal Aunt    • Heart disease Maternal Uncle    • Heart disease Paternal Aunt    • Heart disease Paternal Uncle    • Heart disease Maternal Grandmother    • Heart disease Maternal Grandfather    • Heart disease Paternal Grandmother    • Heart disease Paternal Grandfather        Social History     Socioeconomic History   • Marital status:      Spouse name: Becca   • Number of children: 2   • Years of education: 12   Tobacco Use   • Smoking status: Some Days     Packs/day: 0.25     Years: 17.00     Pack years: 4.25     Types: Cigars, Cigarettes     Start date: 4/11/2022   • Smokeless tobacco: Never   Vaping Use   • Vaping Use: Never used   Substance and Sexual Activity   • Alcohol use: No   • Drug use: No   • Sexual activity: Defer     Partners: Female     Birth control/protection: None           Objective   Physical Exam  Vitals and nursing note reviewed.   Constitutional:       General: He is not in acute distress.     Appearance: He is well-developed. He is not diaphoretic.   HENT:      Head: Normocephalic and atraumatic.      Right Ear: External ear normal.      Left Ear: External ear normal.      Nose: Nose normal.   Eyes:      Conjunctiva/sclera: Conjunctivae normal.      Pupils:  Pupils are equal, round, and reactive to light.   Neck:      Vascular: No JVD.      Trachea: No tracheal deviation.   Cardiovascular:      Rate and Rhythm: Normal rate and regular rhythm.      Heart sounds: Normal heart sounds. No murmur heard.  Pulmonary:      Effort: Pulmonary effort is normal. No respiratory distress.      Breath sounds: Normal breath sounds. No wheezing.   Abdominal:      General: Bowel sounds are normal.      Palpations: Abdomen is soft.      Tenderness: There is no abdominal tenderness.   Musculoskeletal:         General: No deformity.      Left wrist: Tenderness present. Decreased range of motion.      Cervical back: Normal range of motion and neck supple.      Left lower leg: Tenderness present.      Left foot: Decreased range of motion. Tenderness present.   Skin:     General: Skin is warm and dry.      Coloration: Skin is not pale.      Findings: Abrasion and bruising present. No erythema or rash.          Neurological:      Mental Status: He is alert and oriented to person, place, and time.      Cranial Nerves: No cranial nerve deficit.   Psychiatric:         Behavior: Behavior normal.         Thought Content: Thought content normal.         Procedures           ED Course  ED Course as of 10/12/22 0829   Mon Sep 26, 2022   0025 XR Humerus Left  IMPRESSION:  Negative left humerus.     Signer Name: Ned Black MD   Signed: 9/26/2022 12:18 AM   [facility]    [facility] []   0025 XR Ankle 3+ View Left  IMPRESSION:  Negative left ankle.     Signer Name: Ned Black MD   Signed: 9/26/2022 12:18 AM   Workstation [facility]    [facility] []   0025 XR Foot 3+ View Left  IMPRESSION:  Negative left foot.     Signer Name: Ned Black MD   Signed: 9/26/2022 12:17 AM   Workstation [facility]    [facility] []   0025 XR Hand 3+ View Left  IMPRESSION:  Negative left hand.     Signer Name: Ned  MD Wayne   Signed: 9/26/2022 12:17 AM   Workstation Name: [facility]-    [facility] []   0026 XR Forearm 2 View Left  IMPRESSION:  Negative left forearm.     Signer Name: Ned Black MD   Signed: 9/26/2022 12:17 AM   Workstation Name: [email or web address]-    [facility] []   0027 XR Elbow 3+ View Left     IMPRESSION:  Negative left elbow.     Signer Name: Ned Black MD   Signed: 9/26/2022 12:16 AM   Workstation Name: [facility]    [facility] []   0027 Work up and results were discussed throughly with the patients.  The patient will be discharged for further monitoring and management with their PCP.  Red flags, warning signs, worsening symptoms, and when to return to the ER discussed with and understood by the patients.  Patient will follow up with their PCP in a timely manner.  Vitals stable at discharge.  [SM]      ED Course User Index  [] Becca Magana, BALDOMERO                                           [facility]    Final diagnoses:   Fall, initial encounter   Abrasion   Intractable episodic cluster headache       ED Disposition  ED Disposition     ED Disposition   Discharge    Condition   Stable    Comment   --             Tiera Valenzuela, APRN  [address] [address]  [phone]    Schedule an appointment as soon as possible for a visit in 2 days           Medication List      No changes were made to your prescriptions during this visit.          Becca Magana, BALDOMERO  10/06/22 0023       Becca Magana APRN  10/12/22 [id number]

## 2022-10-10 ENCOUNTER — OFFICE VISIT (OUTPATIENT)
Dept: FAMILY MEDICINE CLINIC | Facility: CLINIC | Age: 52
End: 2022-10-10

## 2022-10-10 VITALS
HEIGHT: 67 IN | BODY MASS INDEX: 37.92 KG/M2 | WEIGHT: 241.6 LBS | RESPIRATION RATE: 12 BRPM | HEART RATE: 60 BPM | DIASTOLIC BLOOD PRESSURE: 74 MMHG | SYSTOLIC BLOOD PRESSURE: 120 MMHG | TEMPERATURE: 98.4 F | OXYGEN SATURATION: 99 %

## 2022-10-10 DIAGNOSIS — E87.6 HYPOKALEMIA: ICD-10-CM
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
## 2022-10-10 DIAGNOSIS — J30.1 SEASONAL ALLERGIC RHINITIS DUE TO POLLEN: ICD-10-CM

## 2022-10-10 DIAGNOSIS — F51.01 PRIMARY INSOMNIA: ICD-10-CM

## 2022-10-10 DIAGNOSIS — M79.672 LEFT FOOT PAIN: Primary | ICD-10-CM

## 2022-10-10 DIAGNOSIS — Z23 ENCOUNTER FOR IMMUNIZATION: ICD-10-CM

## 2022-10-10 DIAGNOSIS — E55.9 VITAMIN D DEFICIENCY: ICD-10-CM

## 2022-10-10 DIAGNOSIS — I10 ESSENTIAL HYPERTENSION: ICD-10-CM

## 2022-10-10 DIAGNOSIS — K21.9 GASTROESOPHAGEAL REFLUX DISEASE WITHOUT ESOPHAGITIS: ICD-10-CM

## 2022-10-10 DIAGNOSIS — R56.9 SEIZURES: ICD-10-CM

## 2022-10-10 DIAGNOSIS — F51.04 PSYCHOPHYSIOLOGICAL INSOMNIA: ICD-10-CM

## 2022-10-10 PROCEDURE — 90471 IMMUNIZATION ADMIN: CPT | Performed by: NURSE PRACTITIONER

## 2022-10-10 PROCEDURE — 90677 PCV20 VACCINE IM: CPT | Performed by: NURSE PRACTITIONER

## 2022-10-10 PROCEDURE — 90472 IMMUNIZATION ADMIN EACH ADD: CPT | Performed by: NURSE PRACTITIONER

## 2022-10-10 PROCEDURE — 99214 OFFICE O/P EST MOD 30 MIN: CPT | Performed by: NURSE PRACTITIONER

## 2022-10-10 PROCEDURE — 90686 IIV4 VACC NO PRSV 0.5 ML IM: CPT | Performed by: NURSE PRACTITIONER

## 2022-10-10 RX ORDER — PHENYTOIN SODIUM 100 MG/1
200 CAPSULE, EXTENDED RELEASE ORAL 2 TIMES DAILY
Qty: 120 CAPSULE | Refills: 5 | Status: SHIPPED | OUTPATIENT
Start: 2022-10-10

## 2022-10-10 RX ORDER — TEMAZEPAM 15 MG/1
15 CAPSULE ORAL NIGHTLY PRN
Qty: 30 CAPSULE | Refills: 2 | Status: SHIPPED | OUTPATIENT
Start: 2022-10-10 | End: 2022-11-14 | Stop reason: DRUGHIGH

## 2022-10-10 RX ORDER — ERGOCALCIFEROL 1.25 MG/1
50000 CAPSULE ORAL
Qty: 4 CAPSULE | Refills: 5 | Status: SHIPPED | OUTPATIENT
Start: 2022-10-10

## 2022-10-10 RX ORDER — FAMOTIDINE 40 MG/1
40 TABLET, FILM COATED ORAL 2 TIMES DAILY
Qty: 60 TABLET | Refills: 5 | Status: SHIPPED | OUTPATIENT
Start: 2022-10-10

## 2022-10-10 RX ORDER — MIRTAZAPINE 30 MG/1
45 TABLET, FILM COATED ORAL NIGHTLY
Qty: 45 TABLET | Refills: 5 | Status: SHIPPED | OUTPATIENT
Start: 2022-10-10

## 2022-10-10 RX ORDER — DEXLANSOPRAZOLE 60 MG/1
60 CAPSULE, DELAYED RELEASE ORAL DAILY
Qty: 30 CAPSULE | Refills: 5 | Status: SHIPPED | OUTPATIENT
Start: 2022-10-10 | End: 2023-03-14 | Stop reason: SDUPTHER

## 2022-10-10 RX ORDER — POTASSIUM CHLORIDE 20MEQ/15ML
10 LIQUID (ML) ORAL DAILY
Qty: 225 ML | Refills: 3 | Status: SHIPPED | OUTPATIENT
Start: 2022-10-10

## 2022-10-10 NOTE — PROGRESS NOTES
"Subjective   Jaquan Mckay is a 51 y.o. male.     Chief Complaint   Patient presents with   • Hypertension       History of Present Illness     HTN-ongoing.  He is on Lisinopril 30 mg.  No negative side effects.  No associated symptoms such as CP, SOA, HA, or dizziness.  GI problem-continues to be under the care of GI for diarrhea.  He has been most recently started on Questran 4 g 1 packet twice daily.  He has been on this medication last year but it was discontinued by different provider.  He is also being given declot coming 10 mg 4 times daily.  Today he reports \"not too bad\".    Asthma and allergies-she is under the care of allergy and asthma specialist.  He is currently on immunotherapy.  He is also ordered Nasonex, Singulair 10 mg, Astelin nasal spray and Claritin 10 mg.  He is on as needed albuterol.  He reports that he is tolerating his allergy injections well.  Ankle pain-continues to be ongoing.  He reports several falls due to his left ankle Popping and then giving away.  He is wearing an OTC brace today.  He reports that he continues to have arch pain that radiates to his heel and up the back of his leg.    Seizure disorder-ongoing.  He has not been able to obtain updated imaging.  He reports that he cannot do an MRI due to his claustrophobia and the need for the diagnostic staff to put his head in a stabilizer and \"it is too close to my face and I cannot breath\".    Muscle relaxant-reports that Robaxin is  Not helping and he would like to try something else.  He has been on both flexeril and zanaflex.  He would like to try zanaflex again.    Potassium concern-reports that he does not think he is digesting his potassium.  He is seeing the whole pill in his stool.     The following portions of the patient's history were reviewed and updated as appropriate: CC, ROS, allergies, current medications, past family history, past medical history, past social history, past surgical history and problem " "list.      Review of Systems   Constitutional: Positive for fatigue. Negative for activity change, appetite change and fever.   HENT: Negative for congestion, ear pain, facial swelling, nosebleeds, rhinorrhea, sinus pressure, sore throat and trouble swallowing.    Eyes: Negative for blurred vision, double vision and redness.   Respiratory: Negative for cough, chest tightness, shortness of breath and wheezing.    Cardiovascular: Negative for chest pain, palpitations and leg swelling.   Gastrointestinal: Negative for abdominal pain, blood in stool, constipation, diarrhea, nausea and vomiting.   Endocrine: Negative.    Genitourinary: Negative for dysuria, flank pain, frequency, hematuria and urgency.   Musculoskeletal: Positive for arthralgias, back pain, gait problem and myalgias.   Skin: Negative.    Allergic/Immunologic: Negative.    Neurological: Negative for dizziness, weakness, light-headedness and headache.   Hematological: Negative.    Psychiatric/Behavioral: Positive for dysphoric mood and stress. Negative for self-injury, sleep disturbance and suicidal ideas. The patient is not nervous/anxious.    All other systems reviewed and are negative.      Objective     /74   Pulse 60   Temp 98.4 °F (36.9 °C)   Resp 12   Ht 170.2 cm (67.01\")   Wt 110 kg (241 lb 9.6 oz)   SpO2 99%   BMI 37.83 kg/m²     Physical Exam  Vitals reviewed.   Constitutional:       General: He is not in acute distress.     Appearance: He is well-developed. He is not diaphoretic.   HENT:      Head: Normocephalic and atraumatic.      Jaw: No tenderness.      Comments: Oropharynx not examined.  Patient is presently wearing a face covering/mask due to COVID-19 pandemic.     Right Ear: Hearing, tympanic membrane, ear canal and external ear normal.      Left Ear: Hearing, tympanic membrane, ear canal and external ear normal.   Eyes:      General: Lids are normal. No scleral icterus.     Extraocular Movements:      Right eye: Normal " extraocular motion and no nystagmus.      Left eye: Normal extraocular motion and no nystagmus.      Conjunctiva/sclera: Conjunctivae normal.      Pupils: Pupils are equal, round, and reactive to light.   Neck:      Thyroid: No thyromegaly or thyroid tenderness.      Vascular: No carotid bruit or JVD.      Trachea: No tracheal tenderness.   Cardiovascular:      Rate and Rhythm: Normal rate and regular rhythm.      Pulses:           Dorsalis pedis pulses are 2+ on the right side and 2+ on the left side.        Posterior tibial pulses are 2+ on the right side and 2+ on the left side.      Heart sounds: Normal heart sounds, S1 normal and S2 normal. No murmur heard.  Pulmonary:      Effort: Pulmonary effort is normal. No accessory muscle usage, prolonged expiration or respiratory distress.      Breath sounds: Normal breath sounds.   Chest:      Chest wall: No tenderness.   Abdominal:      General: Bowel sounds are normal. There is no distension.      Palpations: Abdomen is soft. There is no mass.      Tenderness: There is no abdominal tenderness.   Musculoskeletal:         General: No tenderness.      Cervical back: Normal range of motion and neck supple.      Right lower leg: No edema.      Left lower leg: No edema.      Comments: No muscular atrophy or flaccidity.   Lymphadenopathy:      Head:      Right side of head: No submental or submandibular adenopathy.      Left side of head: No submental or submandibular adenopathy.      Cervical: No cervical adenopathy.      Right cervical: No superficial cervical adenopathy.     Left cervical: No superficial cervical adenopathy.   Skin:     General: Skin is warm and dry.      Capillary Refill: Capillary refill takes less than 2 seconds.      Coloration: Skin is not jaundiced or pale.      Findings: No erythema.      Nails: There is no clubbing.   Neurological:      Mental Status: He is alert and oriented to person, place, and time.      Cranial Nerves: No cranial nerve  deficit or facial asymmetry.      Sensory: No sensory deficit.      Motor: No weakness, tremor, atrophy or abnormal muscle tone.      Coordination: Coordination normal.      Deep Tendon Reflexes: Reflexes are normal and symmetric.   Psychiatric:         Attention and Perception: He is attentive.         Mood and Affect: Mood normal.         Speech: Speech normal.         Behavior: Behavior normal. Behavior is cooperative.         Thought Content: Thought content normal.         Judgment: Judgment normal.           Diagnoses and all orders for this visit:    1. Left foot pain (Primary)  -     Ambulatory Referral to Podiatry    2. Fall, subsequent encounter  -     Ambulatory Referral to Podiatry    3. Psychophysiological insomnia  Overview:  With depression and anxiety      Assessment & Plan:  Continue restoril 15 mg.  Continue remeron   Report any increase in anxiety and insomnia.    Orders:  -     temazepam (Restoril) 15 MG capsule; Take 1 capsule by mouth At Night As Needed for Sleep.  Dispense: 30 capsule; Refill: 2    4. Vitamin D deficiency  -     vitamin D (ERGOCALCIFEROL) 1.25 MG (96899 UT) capsule capsule; Take 1 capsule by mouth Every 7 (Seven) Days.  Dispense: 4 capsule; Refill: 5    5. Gastroesophageal reflux disease without esophagitis  Comments:  Continue Dexilant 60 mg and famotidine 40 mg twice daily.  Avoid known food triggers  Orders:  -     dexlansoprazole (Dexilant) 60 MG capsule; Take 1 capsule by mouth Daily.  Dispense: 30 capsule; Refill: 5  -     famotidine (Pepcid) 40 MG tablet; Take 1 tablet by mouth 2 (Two) Times a Day.  Dispense: 60 tablet; Refill: 5    6. Primary insomnia  Comments:  Continue conservative measures and Remeron 45 mg. patient has failed multiple different medications and has multiple medication allergies.  Orders:  -     mirtazapine (REMERON) 30 MG tablet; Take 1.5 tablets by mouth Every Night.  Dispense: 45 tablet; Refill: 5    7. Seizures (HCC)  Comments:  No known recent  activity.  Continue Dilantin.  We will plan for an updated phenytoin level  Assessment & Plan:  Continue Dilantin  Report any new symptoms.  Report any side effects of medications    Orders:  -     Dilantin 100 MG capsule; Take 2 capsules by mouth 2 (Two) Times a Day.  Dispense: 120 capsule; Refill: 5    8. Encounter for immunization  Comments:  flu and pneumonia vaccine today  Orders:  -     FluLaval/Fluzone >6 mos (2604-7429)  -     Pneumococcal Conjugate Vaccine 20-Valent (PCV20)    9. Essential hypertension  Assessment & Plan:  Continue lisinopril 30 mg.  Continue under the care of cardiology.  Ambulatory BP monitoring either at home or random community checks.  Patient to report continued elevations >140/90.  Patient may come by office for checks if needed.       10. Seasonal allergic rhinitis due to pollen  Assessment & Plan:  Continue under the care of allergy specialist.  Continue claritin, singulair, flonase, and astelin  Continue allergy injections      11. Hypokalemia  -     potassium chloride (KAYCIEL) 20 mEq/15 mL solution; Take 7.5 mL by mouth Daily.  Dispense: 225 mL; Refill: 3       Class 2 Severe Obesity (BMI >=35 and <=39.9). Obesity-related health conditions include the following: hypertension, dyslipidemias, GERD and osteoarthritis. Obesity is unchanged. BMI is is above average. We discussed portion control and increasing exercise.       Understands disease processes and need for medications.  Understands reasons for urgent and emergent care.  Patient (& family) verbalized agreement for treatment plan.   Emotional support and active listening provided.  Patient provided time to verbalize feelings.    CH[facility]Z/PMDP reviewed today and consistent.  Will refill prescribed controlled medication today.  Patient is aware they cannot receive narcotics from any other provider except if under care of pain management or speciality clinic.  Risk and benefits of medication use has been reviewed.  History and  physical exam exhibit continued safe and appropriate use of controlled substances.  The patient is aware of the potential for addiction and dependence.  This patient has been made aware of the appropriate use of such medications, including potential risk of somnolence, limited ability to drive and / or work safely, and potential for overdose.    It has also been made clear that these medications are for use by this patient only, without concomitant use of alcohol or other substances unless prescribed/advised by medical provider.  Patient understands they may be subject to UDS and pill counts at random.      Patient considered to be low risk for addiction due to use of single controlled medications.  Patient understands and accepts these risks.  Patient need for medication will be reassessed at each visit.  Doses will be adjusted according to patient need and findings.    Goal of TX: Patient will not have any adverse reactions of medication.  Patient will have improvement in sleep hygiene/pattern with use of Restoril as needed as directed.  Medication Dispense Information    Temazepam   Dispensed: 8/22/2022 12:00 AM   Written:  6/20/2022   Unit strength: 15MG   Days supply: 30   Quantity: 30 each   Pharmacy: [facility]   Authorizing provider: LENNOX ROE   Received from: [facility] PD[facility] (Fill History)   Brand or Generic:       RTC 1 month, sooner if needed.           This document has been electronically signed by:  BALDOMERO Thao FNP-C Dragon disclaimer:  Part of this note may be an electronic transcription/translation of spoken language to printed text using the Dragon Dictation System.

## 2022-10-12 ENCOUNTER — TREATMENT (OUTPATIENT)
Dept: PHYSICAL THERAPY | Facility: CLINIC | Age: 52
End: 2022-10-12

## 2022-10-12 ENCOUNTER — TELEPHONE (OUTPATIENT)
Dept: FAMILY MEDICINE CLINIC | Facility: CLINIC | Age: 52
End: 2022-10-12

## 2022-10-12 DIAGNOSIS — M25.522 LEFT ELBOW PAIN: Primary | ICD-10-CM

## 2022-10-12 DIAGNOSIS — M25.622 DECREASED ROM OF LEFT ELBOW: ICD-10-CM

## 2022-10-12 PROCEDURE — 97110 THERAPEUTIC EXERCISES: CPT | Performed by: PHYSICAL THERAPIST

## 2022-10-12 PROCEDURE — 97140 MANUAL THERAPY 1/> REGIONS: CPT | Performed by: PHYSICAL THERAPIST

## 2022-10-12 PROCEDURE — 97035 APP MDLTY 1+ULTRASOUND EA 15: CPT | Performed by: PHYSICAL THERAPIST

## 2022-10-12 NOTE — TELEPHONE ENCOUNTER
Meaning?  Is it the right medication or dose?  He was on 10 mEq Adequate: hears normal conversation without difficulty

## 2022-10-12 NOTE — TELEPHONE ENCOUNTER
Pharmacy Name:     [facility] [place], [place] -     Pharmacy representative name: KATERINA    Pharmacy representative phone number:[phone]    What medication are you calling in regards to: potassium chloride (KAYCIEL) 20 mEq/15 mL solution    What question does the pharmacy have: KATERINA ASKED IF THE LIQUID FORM FOR THIS MEDICATION IS CORRECT    Who is the provider that prescribed the medication: LENNOX ROE

## 2022-10-12 NOTE — PROGRESS NOTES
Physical Therapy Daily Treatment Note      Patient: Jaquan Mckay   : 1970  Referring practitioner: Ronald S Dubin, MD  Date of Initial Visit: Type: THERAPY  Noted: 2022  Today's Date: 10/12/2022  Patient seen for 7 sessions       Visit Diagnoses:    ICD-10-CM ICD-9-CM   1. Left elbow pain  M25.522 719.42   2. Decreased ROM of left elbow  M25.622 719.52       Subjective: Patient arrives to therapy with reports of /10 left elbow pain. Otherwise pt states of no new complaints/ changes.     Objective   See Exercise, Manual, and Modality Logs for complete treatment.       Assessment/Plan: Patient responded well to today's session with decreased pain noted following, 4/10. MH applied to left elbow for improved tissue mobility f/b manual rx, therex as listed and pulsed US following. Exercise completed with continued focus on improved / wrist/ elbow strength and ROM. Activities progressed with addition of power web added to assist with  strength, and strengthening repetitions increased as tolerated. Attempted to progress resistance from one to two pounds with wrist extension, however secondary to soreness weight reduced to one pound. Cryotherapy applied at conclusion. Pt will be progressed with therapy as tolerated to address goals, reduce pain, and restore functional mobility. No adverse reactions observed during, and/ or following tx. Continue with PT's POC.     Timed:         Manual Therapy:    14     mins  55926;     Therapeutic Exercise:     31    mins  76535;     Neuromuscular Jazmin:        mins  49105;    Therapeutic Activity:          mins  36615;     Gait Training:           mins  51337;     Ultrasound:    8      mins  81473;    Ionto                                   mins   12903  Self Care                            mins   48065  Canalith Repos         mins 25421      Un-Timed:  Electrical Stimulation:         mins  28399 ( );  Dry Needling          mins self-pay  Traction           mins 67062      Timed Treatment:  53    mins   Total Treatment:     63   mins (MH/CP: x 10 min)     Leighann Brown. [place], NEIL  [place] License: [id number]

## 2022-10-14 ENCOUNTER — TREATMENT (OUTPATIENT)
Dept: PHYSICAL THERAPY | Facility: CLINIC | Age: 52
End: 2022-10-14

## 2022-10-14 DIAGNOSIS — M25.522 LEFT ELBOW PAIN: Primary | ICD-10-CM

## 2022-10-14 DIAGNOSIS — M25.622 DECREASED ROM OF LEFT ELBOW: ICD-10-CM

## 2022-10-14 PROCEDURE — 97035 APP MDLTY 1+ULTRASOUND EA 15: CPT | Performed by: PHYSICAL THERAPIST

## 2022-10-14 PROCEDURE — 97110 THERAPEUTIC EXERCISES: CPT | Performed by: PHYSICAL THERAPIST

## 2022-10-14 PROCEDURE — 97140 MANUAL THERAPY 1/> REGIONS: CPT | Performed by: PHYSICAL THERAPIST

## 2022-10-14 NOTE — PROGRESS NOTES
Physical Therapy Daily Treatment Note      Patient: Jaquan Mckay   : 1970  Referring practitioner: Ronald S Dubin, MD  Date of Initial Visit: Type: THERAPY  Noted: 2022  Today's Date: 10/14/2022  Patient seen for 8 sessions       Visit Diagnoses:    ICD-10-CM ICD-9-CM   1. Left elbow pain  M25.522 719.42   2. Decreased ROM of left elbow  M25.622 719.52       Subjective: Patient arrives to therapy with reports of 7/10 left elbow pain. Pt states he continues to have difficulty gripping things with his left hand. Pt reports he has been tolerating the therapy sessions well with no complaints.      Objective   See Exercise, Manual, and Modality Logs for complete treatment.       Assessment/Plan:  Patient completed today's session with reports of slight decrease in pain following, 5/10. Pt received manual STM to L) lateral epicondyle/ wrist extensor region to assist with pain control and decrease tightness f/b therex as listed and pulsed US following. Exercise performed with focus on eccentric strengthening. Additional activities added including pronation/ supination with bar. Pt noted with some fatigue, however tolerable. Pt reported tenderness along wrist extensor region during manual therapy. Cryotherapy applied at conclusion. Pt continues to benefit from therapy services and will be progressed as tolerated. No signs of distress or adverse reactions observed during, and/ or following tx. Continue with PT's POC.       Timed:         Manual Therapy:    15     mins  02152;     Therapeutic Exercise:    32     mins  75077;     Neuromuscular Jazmin:        mins  85562;    Therapeutic Activity:          mins  08828;     Gait Training:           mins  63911;     Ultrasound:    8      mins  24254;    Ionto                                   mins   02541  Self Care                            mins   35159  Canalith Repos         mins 67588      Un-Timed:  Electrical Stimulation:         mins  41467 ( );  Dry  Needling          mins self-pay  Traction          mins 18737      Timed Treatment:  55    mins   Total Treatment:    60    mins (CP: x 5 min)    Leighann Brown. [place], PTA  [place] License: [id number]

## 2022-10-19 ENCOUNTER — TREATMENT (OUTPATIENT)
Dept: PHYSICAL THERAPY | Facility: CLINIC | Age: 52
End: 2022-10-19

## 2022-10-19 DIAGNOSIS — M25.622 DECREASED ROM OF LEFT ELBOW: ICD-10-CM

## 2022-10-19 DIAGNOSIS — M25.522 LEFT ELBOW PAIN: Primary | ICD-10-CM

## 2022-10-19 DIAGNOSIS — M77.12 LATERAL EPICONDYLITIS OF LEFT ELBOW: ICD-10-CM

## 2022-10-19 PROCEDURE — 97140 MANUAL THERAPY 1/> REGIONS: CPT | Performed by: PHYSICAL THERAPIST

## 2022-10-19 PROCEDURE — 97110 THERAPEUTIC EXERCISES: CPT | Performed by: PHYSICAL THERAPIST

## 2022-10-19 PROCEDURE — 97530 THERAPEUTIC ACTIVITIES: CPT | Performed by: PHYSICAL THERAPIST

## 2022-10-19 NOTE — PROGRESS NOTES
Physical Therapy Re Certification Of Plan of Care  Patient: Jaquan Mckay   : 1970  Diagnosis/ICD-10 Code:  Left elbow pain [M25.522]  Referring practitioner: Ronald S Dubin, MD  Date of Initial Visit: Type: THERAPY  Noted: 2022  Today's Date: 10/19/2022  Patient seen for 9 sessions         Visit Diagnoses:    ICD-10-CM ICD-9-CM   1. Left elbow pain  M25.522 719.42   2. Decreased ROM of left elbow  M25.622 719.52   3. Lateral epicondylitis of left elbow  M77.12 726.32         Jaquan Mckay reports:   Subjective Questionnaire: QuickDASH: 75%  Clinical Progress: improved  Home Program Compliance: Yes  Treatment has included: therapeutic exercise, neuromuscular re-education, manual therapy, therapeutic activity, electrical stimulation, ultrasound, moist heat and cryotherapy      Subjective Evaluation    History of Present Illness    Subjective comment: Pt will see his MD on 2022.  Pt reports therapy has been helping with improved elbow mobility and decreased pain.Pain  Current pain ratin  At best pain ratin  At worst pain ratin  Location: left triceps insertion and left wrist ext origin             Objective          Active Range of Motion     Left Elbow   Flexion: 125 degrees   Extension: 4 degrees   Forearm supination: 70 degrees   Forearm pronation: 90 degrees     Left Wrist   Wrist flexion: 70 degrees   Wrist extension: 75 degrees     Strength/Myotome Testing     Left Wrist/Hand      (2nd hand position)     Trial 1: 60 lbs    Trial 2: 70 lbs    Trial 3: 75 lbs    Average: 68.33 lbs          Assessment & Plan     Assessment  Impairments: abnormal muscle firing, abnormal or restricted ROM, activity intolerance, impaired physical strength, lacks appropriate home exercise program and pain with function  Functional Limitations: carrying objects, lifting, sleeping, pulling, pushing, uncomfortable because of pain, reaching overhead and unable to perform repetitive tasks  Assessment  details: Pt is a 52 y/o male referred to therapy for treatment of left elbow pain. Pt has attended 9 sessions with decreased pain, improved ROM and improved strength. Pt had an improved Quick Dash from 82% to 75%.  Pt had a 55 pound improved left  improvement and also demonstrated decreased pain and improved left wrist and elbow ROM.  Pt responded well to tx today with 5/10 post pain.  Prognosis: good    Goals  Plan Goals: STG 6 weeks    1 Pt will be instructed in a HEP.met  2 Pt will improve his  strength to 30# to improve opening jars.met  3 Pt will improve his Quick Dash to less than 50%.not met    LTG 12 weeks    1 Pt will improve his left elbow ROM to at least 5-100 degrees.met  2 Pt will improve his left elbow gross strength to 4/5.not met  3 Pt will improve his  strength to 70#.progressing  4 Pt will report pain no greater than 5/10 with ADLs.not met  5 Pt will improve his Quick Dash to less than 30%.not met    Plan  Therapy options: will be seen for skilled therapy services  Planned modality interventions: TENS, thermotherapy (hydrocollator packs), ultrasound, thermotherapy (paraffin bath) and cryotherapy  Planned therapy interventions: ADL retraining, flexibility, functional ROM exercises, home exercise program, IADL retraining, joint mobilization, manual therapy, neuromuscular re-education, postural training, soft tissue mobilization, strengthening, stretching, therapeutic activities and motor coordination training  Frequency: 2x week  Duration in weeks: 8  Treatment plan discussed with: patient  Plan details: Will follow for optimal gains.    Moderate Evaluation  39249    Re-evaluation   40280      Therapeutic exercise  92854  Therapeutic activity    85221  Neuromuscular re-education   13437  Manual therapy   43998    Unattended e-stim (Medicaid/Medicare)     Moist heat/cryotherapy 73224   Ultrasound   77798               Recommendations: Continue as planned  Timeframe: 2  months  Prognosis to achieve goals: good      Timed:         Manual Therapy:    15     mins  32506;     Therapeutic Exercise:    33     mins  27255;     Neuromuscular Jazmin:        mins  62625;    Therapeutic Activity:     8     mins  02601;     Gait Training:           mins  84204;     Ultrasound:          mins  45124;    Ionto                                   mins   21434  Self Care                            mins   09234  Canalith Repos         mins 14153      Un-Timed:  Electrical Stimulation:         mins  02402 (MC );  Dry Needling          mins self-pay  Traction          mins 26467  Re-Eval                               mins  61898      Timed Treatment:   56   mins   Total Treatment:     62   Mins(6 min mh pre tx)          PT: Win Rodriguez PT     [place] License:  [id number]    Electronically signed by Win Rodriguez PT, 10/19/22, 1:15 PM EDT    Certification Period: 10/19/2022 thru 1/16/2023  I certify that the therapy services are furnished while this patient is under my care.  The services outlined above are required by this patient, and will be reviewed every 90 days.         Physician Signature:__________________________________________________    PHYSICIAN: Dubin, Ronald S, MD  NPI: [id number]                                      DATE:  :     Please sign and return via fax to [email or web address] . Thank you, [facility]

## 2022-10-21 ENCOUNTER — TREATMENT (OUTPATIENT)
Dept: PHYSICAL THERAPY | Facility: CLINIC | Age: 52
End: 2022-10-21

## 2022-10-21 DIAGNOSIS — M77.12 LATERAL EPICONDYLITIS OF LEFT ELBOW: ICD-10-CM

## 2022-10-21 DIAGNOSIS — M25.522 LEFT ELBOW PAIN: Primary | ICD-10-CM

## 2022-10-21 DIAGNOSIS — M25.622 DECREASED ROM OF LEFT ELBOW: ICD-10-CM

## 2022-10-21 PROCEDURE — 97140 MANUAL THERAPY 1/> REGIONS: CPT | Performed by: PHYSICAL THERAPIST

## 2022-10-21 PROCEDURE — 97035 APP MDLTY 1+ULTRASOUND EA 15: CPT | Performed by: PHYSICAL THERAPIST

## 2022-10-21 PROCEDURE — 97110 THERAPEUTIC EXERCISES: CPT | Performed by: PHYSICAL THERAPIST

## 2022-10-21 NOTE — PROGRESS NOTES
Physical Therapy Daily Treatment Note      Patient: Jaquan Mckay   : 1970  Referring practitioner: Ronald S Dubin, MD  Date of Initial Visit: Type: THERAPY  Noted: 2022  Today's Date: 10/21/2022  Patient seen for 10 sessions       Visit Diagnoses:    ICD-10-CM ICD-9-CM   1. Left elbow pain  M25.522 719.42   2. Decreased ROM of left elbow  M25.622 719.52   3. Lateral epicondylitis of left elbow  M77.12 726.32       Subjective: Patient reports 7/10 left elbow pain upon arrival to therapy.  Otherwise pt states of no new complaints/ changes.     Objective   See Exercise, Manual, and Modality Logs for complete treatment.       Assessment/Plan: Patient responded well to today's session with reports of slight decrease in pain following, 4/10. Treatment initiated with manual soft tissue mobilization to left elbow and wrist extensor region to assist with pain control and tightness f/b therex as listed and pulsed US following. Exercise progressed with wrist radial/ ulnar deviation with bar initiated as well as digi extend to assist with improved strength. Pt observed with some muscle fatigue, however tolerable. Pt provided with cues and demonstration for form, and for max benefit. Cryotherapy applied at conclusion. Pt continues to benefit from therapy services, and will be progressed as tolerated to address goals, reduce pain, and improve strength. No signs of distress or adverse reactions observed during, and/ or following tx. Continue with PT's POC.       Timed:         Manual Therapy:    14     mins  56878;     Therapeutic Exercise:    33     mins  82699;     Neuromuscular Jazmin:        mins  67946;    Therapeutic Activity:          mins  41041;     Gait Training:           mins  69987;     Ultrasound:    8      mins  16801;    Ionto                                   mins   04289  Self Care                            mins   98369  Canalith Repos         mins 29130      Un-Timed:  Electrical Stimulation:          mins  69210 ( );  Dry Needling          mins self-pay  Traction          mins 89822      Timed Treatment:   55   mins   Total Treatment:    59    mins (CP: x 4 min)    Leighann Brown. NEIL [place]  [place] License: [id number]

## 2022-10-25 ENCOUNTER — TELEPHONE (OUTPATIENT)
Dept: FAMILY MEDICINE CLINIC | Facility: CLINIC | Age: 52
End: 2022-10-25

## 2022-10-25 DIAGNOSIS — E78.2 MIXED HYPERLIPIDEMIA: Primary | ICD-10-CM

## 2022-10-25 RX ORDER — SIMVASTATIN 10 MG
10 TABLET ORAL NIGHTLY
Qty: 90 TABLET | Refills: 2 | Status: SHIPPED | OUTPATIENT
Start: 2022-10-25 | End: 2023-03-29

## 2022-10-25 NOTE — TELEPHONE ENCOUNTER
----- Message from BALDOMERO Thao sent at 10/25/2022  5:51 PM EDT -----  Sent   ----- Message -----  From: Gissell Isidro MA  Sent: 10/25/2022   4:33 PM EDT  To: BALDOMERO Thao    Patient calling, states that he was suppose to get new cholesterol pill on  10/10 but pharmacy hasn't received. Please advise.

## 2022-10-26 ENCOUNTER — TREATMENT (OUTPATIENT)
Dept: PHYSICAL THERAPY | Facility: CLINIC | Age: 52
End: 2022-10-26

## 2022-10-26 DIAGNOSIS — M77.12 LATERAL EPICONDYLITIS OF LEFT ELBOW: ICD-10-CM

## 2022-10-26 DIAGNOSIS — M25.522 LEFT ELBOW PAIN: Primary | ICD-10-CM

## 2022-10-26 DIAGNOSIS — M25.622 DECREASED ROM OF LEFT ELBOW: ICD-10-CM

## 2022-10-26 PROCEDURE — 97530 THERAPEUTIC ACTIVITIES: CPT | Performed by: PHYSICAL THERAPIST

## 2022-10-26 PROCEDURE — 97140 MANUAL THERAPY 1/> REGIONS: CPT | Performed by: PHYSICAL THERAPIST

## 2022-10-26 PROCEDURE — 97110 THERAPEUTIC EXERCISES: CPT | Performed by: PHYSICAL THERAPIST

## 2022-10-26 NOTE — PROGRESS NOTES
Physical Therapy Daily Treatment Note      Patient: Jaquan Mckay   : 1970  Referring practitioner: Ronald S Dubin, MD  Date of Initial Visit: Type: THERAPY  Noted: 2022  Today's Date: 10/26/2022  Patient seen for 11 sessions       Visit Diagnoses:    ICD-10-CM ICD-9-CM   1. Left elbow pain  M25.522 719.42   2. Decreased ROM of left elbow  M25.622 719.52   3. Lateral epicondylitis of left elbow  M77.12 726.32       Subjective: Patient reports /10 left elbow pain prior to tx. Pt states he feels his pain is increased due to the cold weather and change in temps. Pt reports pain is on the inside and outside of the elbow today.    Objective   See Exercise, Manual, and Modality Logs for complete treatment.       Assessment/Plan: Patient completed today's session with reports of slight decrease in pain noted following, 5/10. Pt received manual soft tissue mobilization to left lateral elbow and wrist extensor region to assist with pain control and decrease tightness f/b therex and therapeutic activities as listed and MH following. Exercise performed with continued focus on improved left elbow ROM, improved left UE strength, and to assist with ease of difficulty performing ADL's including gripping. Pt limited with overall progression of exericse on this day secondary to increased pain ratings upon arrival. Repetitions reduced with bicep curls secondary to soreness reported. Pt educated to perform activities to his tolerance, and utilize ice at home to assist with pain control. Pt verbalized understanding. No signs of distress or adverse reactions observed during, and/ or following tx. Pt will be progressed with therapy as tolerated. Continue with PT's POC.       Timed:         Manual Therapy:    14     mins  87075;     Therapeutic Exercise:     30    mins  63462;     Neuromuscular Jazmin:        mins  10039;    Therapeutic Activity:    11      mins  37137;     Gait Training:           mins  24386;      Ultrasound:          mins  71972;    Ionto                                   mins   85893  Self Care                            mins   35917  Canalith Repos         mins 90521      Un-Timed:  Electrical Stimulation:         mins  14594 ( );  Dry Needling          mins self-pay  Traction          mins 32599      Timed Treatment:   55   mins   Total Treatment:     61   mins (MH: x 6 min)    Leighann Brown. NEIL [place]  [place] License: [id number]

## 2022-10-28 ENCOUNTER — TREATMENT (OUTPATIENT)
Dept: PHYSICAL THERAPY | Facility: CLINIC | Age: 52
End: 2022-10-28

## 2022-10-28 DIAGNOSIS — M77.12 LATERAL EPICONDYLITIS OF LEFT ELBOW: ICD-10-CM

## 2022-10-28 DIAGNOSIS — M25.622 DECREASED ROM OF LEFT ELBOW: ICD-10-CM

## 2022-10-28 DIAGNOSIS — M25.522 LEFT ELBOW PAIN: Primary | ICD-10-CM

## 2022-10-28 PROCEDURE — 97140 MANUAL THERAPY 1/> REGIONS: CPT | Performed by: PHYSICAL THERAPIST

## 2022-10-28 PROCEDURE — 97110 THERAPEUTIC EXERCISES: CPT | Performed by: PHYSICAL THERAPIST

## 2022-10-28 NOTE — PROGRESS NOTES
Physical Therapy Daily Treatment Note      Patient: Jaquan Mckay   : 1970  Referring practitioner: Ronald S Dubin, MD  Date of Initial Visit: Type: THERAPY  Noted: 2022  Today's Date: 10/28/2022  Patient seen for 12 sessions       Visit Diagnoses:    ICD-10-CM ICD-9-CM   1. Left elbow pain  M25.522 719.42   2. Decreased ROM of left elbow  M25.622 719.52   3. Lateral epicondylitis of left elbow  M77.12 726.32       Subjective: Patient reports /10 left elbow pain upon arrival to therapy. Otherwise pt states of no new complaints/ changes.     Objective   See Exercise, Manual, and Modality Logs for complete treatment.       Assessment/Plan: Patient responded well to today's session with reports of decreased pain noted following, 3/10. Pt received manual soft tissue mobilization to left elbow/ wrist extensor region f/b therex and therapeutic activities as listed. Exercise performed with continued focus on ease of difficulty performing ADL's, improved left elbow range of motion, and left UE strength. Exercise progressed with additional scapular and UE strengthening activities added to program including mid/ low rows, UE bike, and tricep extensions w/ tband. Pt observed with good tolerance and was provided with cues and demonstration for form. Cryotherapy applied at conclusion. Pt continues to benefit from therapy services, and will be progressed as tolerated to address goals, reduce pain, and restore function. No signs of distress or adverse reactions observed during, and/ or following tx. Continue with PT's POC.     Timed:         Manual Therapy:    10     mins  66988;     Therapeutic Exercise:    36     mins  26002;     Neuromuscular Jazmin:        mins  72333;    Therapeutic Activity:          mins  59892;     Gait Training:           mins  50210;     Ultrasound:          mins  58848;    Ionto                                   mins   17737  Self Care                            mins   90169  Natalia  Repos         mins 17793      Un-Timed:  Electrical Stimulation:         mins  38169 ( );  Dry Needling          mins self-pay  Traction          mins 13201      Timed Treatment:  46    mins   Total Treatment:     51   mins (CP: x 5 min)    Leighann Brown. [place], NEIL  [place] License: [id number]

## 2022-11-02 ENCOUNTER — TREATMENT (OUTPATIENT)
Dept: PHYSICAL THERAPY | Facility: CLINIC | Age: 52
End: 2022-11-02

## 2022-11-02 DIAGNOSIS — M25.522 LEFT ELBOW PAIN: Primary | ICD-10-CM

## 2022-11-02 DIAGNOSIS — M25.622 DECREASED ROM OF LEFT ELBOW: ICD-10-CM

## 2022-11-02 PROCEDURE — 97140 MANUAL THERAPY 1/> REGIONS: CPT | Performed by: PHYSICAL THERAPIST

## 2022-11-02 PROCEDURE — 97110 THERAPEUTIC EXERCISES: CPT | Performed by: PHYSICAL THERAPIST

## 2022-11-02 NOTE — PROGRESS NOTES
Physical Therapy Re Certification Of Plan of Care  Patient: Jaquan Mckay   : 1970  Diagnosis/ICD-10 Code:  Left elbow pain [M25.522]  Referring practitioner: Ronald S Dubin, MD  Date of Initial Visit: Type: THERAPY  Noted: 2022  Today's Date: 2022  Patient seen for 13 sessions         Visit Diagnoses:    ICD-10-CM ICD-9-CM   1. Left elbow pain  M25.522 719.42   2. Decreased ROM of left elbow  M25.622 719.52         Jaquan Mckay reports:   Subjective Questionnaire: QuickDASH: 70%  Clinical Progress: improved  Home Program Compliance: Yes  Treatment has included: therapeutic exercise, neuromuscular re-education, manual therapy, therapeutic activity, electrical stimulation, ultrasound, iontophoresis, moist heat and cryotherapy      Subjective Evaluation    History of Present Illness    Subjective comment: Pt reports therapy has been helping with noted improved  strength and decreased pain.Pain  Current pain ratin  At best pain ratin  At worst pain ratin             Objective          Active Range of Motion     Left Elbow   Flexion: 130 degrees   Extension: 0 degrees   Forearm supination: WFL  Forearm pronation: WFL    Left Wrist   Wrist flexion: 70 degrees   Wrist extension: 89 degrees     Strength/Myotome Testing     Left Elbow   Flexion: 4-  Extension: 4-    Left Wrist/Hand      (2nd hand position)     Trial 1: 100 lbs    Trial 2: 105 lbs    Trial 3: 108 lbs    Average: 104.33 lbs          Assessment & Plan     Assessment  Impairments: abnormal muscle firing, abnormal or restricted ROM, activity intolerance, impaired physical strength, lacks appropriate home exercise program and pain with function  Functional Limitations: carrying objects, lifting, sleeping, pulling, pushing, uncomfortable because of pain, reaching overhead and unable to perform repetitive tasks  Assessment details: Pt is a 50 y/o male referred to therapy for treatment of left elbow pain. Pt has attended 13  sessions with decreased pain, improved ROM and improved strength. Pt had an improved Quick Dash from 75% to 70%.  Pt had a 104 pound left  improvement and also demonstrated decreased pain and improved left wrist and elbow ROM.  Pt responded well to tx today with 4/10 post pain.  Prognosis: good    Goals  Plan Goals: STG 6 weeks    1 Pt will be instructed in a HEP.met  2 Pt will improve his  strength to 30# to improve opening jars.met  3 Pt will improve his Quick Dash to less than 50%.not met    LTG 12 weeks    1 Pt will improve his left elbow ROM to at least 5-100 degrees.met  2 Pt will improve his left elbow gross strength to 4/5.progrssing  3 Pt will improve his  strength to 70#.progressing  4 Pt will report pain no greater than 5/10 with ADLs.not met  5 Pt will improve his Quick Dash to less than 30%.not met    Plan  Therapy options: will be seen for skilled therapy services  Planned modality interventions: TENS, thermotherapy (hydrocollator packs), ultrasound, thermotherapy (paraffin bath) and cryotherapy  Planned therapy interventions: ADL retraining, flexibility, functional ROM exercises, home exercise program, IADL retraining, joint mobilization, manual therapy, neuromuscular re-education, postural training, soft tissue mobilization, strengthening, stretching, therapeutic activities and motor coordination training  Frequency: 2x week  Duration in weeks: 4  Treatment plan discussed with: patient  Plan details: Will follow for optimal gains.    Moderate Evaluation  76212    Re-evaluation   79897      Therapeutic exercise  14299  Therapeutic activity    76994  Neuromuscular re-education   81388  Manual therapy   56810    Unattended e-stim (Medicaid/Medicare)     Moist heat/cryotherapy 87121   Ultrasound   85619               Recommendations: Continue as planned  Timeframe: 1 month  Prognosis to achieve goals: good      Timed:         Manual Therapy:    10     mins  56454;     Therapeutic  Exercise:    29     mins  70107;     Neuromuscular Jazmin:        mins  55253;    Therapeutic Activity:          mins  55935;     Gait Training:           mins  19326;     Ultrasound:     8     mins  04588;    Ionto                                   mins   05147  Self Care                            mins   31636  Canalith Repos         mins 22071      Un-Timed:  Electrical Stimulation:         mins  79082 ( );  Dry Needling          mins self-pay  Traction          mins 12188  Re-Eval                               mins  73839      Timed Treatment:   47   mins   Total Treatment:     47   mins          PT: Win Rodriguez PT     [place] License:  [id number]    Electronically signed by Win Rodriguez PT, 11/02/22, 1:33 PM EDT    Certification Period: 11/2/2022 thru 1/30/2023  I certify that the therapy services are furnished while this patient is under my care.  The services outlined above are required by this patient, and will be reviewed every 90 days.         Physician Signature:__________________________________________________    PHYSICIAN: Dubin, Ronald S, MD  NPI: [id number]                                      DATE:  :     Please sign and return via fax to [email or web address] . Thank you, [facility]

## 2022-11-04 ENCOUNTER — TREATMENT (OUTPATIENT)
Dept: PHYSICAL THERAPY | Facility: CLINIC | Age: 52
End: 2022-11-04

## 2022-11-04 DIAGNOSIS — M25.522 LEFT ELBOW PAIN: Primary | ICD-10-CM

## 2022-11-04 DIAGNOSIS — M77.12 LATERAL EPICONDYLITIS OF LEFT ELBOW: ICD-10-CM

## 2022-11-04 DIAGNOSIS — M25.622 DECREASED ROM OF LEFT ELBOW: ICD-10-CM

## 2022-11-04 PROCEDURE — 97140 MANUAL THERAPY 1/> REGIONS: CPT | Performed by: PHYSICAL THERAPIST

## 2022-11-04 PROCEDURE — 97110 THERAPEUTIC EXERCISES: CPT | Performed by: PHYSICAL THERAPIST

## 2022-11-04 NOTE — PROGRESS NOTES
Physical Therapy Daily Treatment Note      Patient: Jaquan Mckay   : 1970  Referring practitioner: Ronald S Dubin, MD  Date of Initial Visit: Type: THERAPY  Noted: 2022  Today's Date: 2022  Patient seen for 14 sessions       Visit Diagnoses:    ICD-10-CM ICD-9-CM   1. Left elbow pain  M25.522 719.42   2. Decreased ROM of left elbow  M25.622 719.52   3. Lateral epicondylitis of left elbow  M77.12 726.32       Subjective:  Patient states he bumped his elbow on the dresser this morning at home which has made him a litte sore. Pt reports 7/10 left elbow pain prior to tx.      Objective   See Exercise, Manual, and Modality Logs for complete treatment.       Assessment/Plan:  Patient noted with good tolerance to today's session with decreased pain following, 3/10. Treatment initiated with manual soft tissue mobilization to left elbow and wrist extensor region for pain control and to decrease tightness f/b therex as listed. Activities progressed with strengthening repetitions increased with mid/ low rows, and tricep extension with theraband, as well as resistance on UE bike increased. Pt observed with good tolerance, however continues to require cues and demonstration to maintain form, and for improved muscle facilitation. Cryotherapy applied at conclusion. No signs of distress or adverse reactions observed during, and/ or following tx. Pt continues to benefit from therapy services, and will be progressed as tolerated to reduce pain, address goals, and restore PLOF. Continue with PT's POC.       Timed:         Manual Therapy:   11      mins  92999;     Therapeutic Exercise:    19     mins  73518;     Neuromuscular Jazmin:        mins  31548;    Therapeutic Activity:          mins  72911;     Gait Training:           mins  07302;     Ultrasound:          mins  17373;    Ionto                                   mins   12707  Self Care                            mins   96197  [facility]         mins  29489      Un-Timed:  Electrical Stimulation:         mins  91814 ( );  Dry Needling          mins self-pay  Traction          mins 08830      Timed Treatment:  30    mins   Total Treatment:   36    mins (CP: x 6 min)     Leighann Brown. Tonny, PTA  [place] License: [id number]

## 2022-11-09 ENCOUNTER — TREATMENT (OUTPATIENT)
Dept: PHYSICAL THERAPY | Facility: CLINIC | Age: 52
End: 2022-11-09

## 2022-11-09 DIAGNOSIS — M25.522 LEFT ELBOW PAIN: Primary | ICD-10-CM

## 2022-11-09 DIAGNOSIS — M77.12 LATERAL EPICONDYLITIS OF LEFT ELBOW: ICD-10-CM

## 2022-11-09 DIAGNOSIS — M25.622 DECREASED ROM OF LEFT ELBOW: ICD-10-CM

## 2022-11-09 PROCEDURE — 97110 THERAPEUTIC EXERCISES: CPT | Performed by: PHYSICAL THERAPIST

## 2022-11-09 PROCEDURE — 97140 MANUAL THERAPY 1/> REGIONS: CPT | Performed by: PHYSICAL THERAPIST

## 2022-11-09 NOTE — PROGRESS NOTES
Physical Therapy Daily Treatment Note      Patient: Jaquan Mckay   : 1970  Referring practitioner: Ronald S Dubin, MD  Date of Initial Visit: Type: THERAPY  Noted: 2022  Today's Date: 2022  Patient seen for 15 sessions       Visit Diagnoses:    ICD-10-CM ICD-9-CM   1. Left elbow pain  M25.522 719.42   2. Decreased ROM of left elbow  M25.622 719.52   3. Lateral epicondylitis of left elbow  M77.12 726.32       Subjective Evaluation    History of Present Illness    Subjective comment: Patient notes that he has 4/10 pain today.  He states that his pain is mostly on the outside of the elbow.       Objective   See Exercise, Manual, and Modality Logs for complete treatment.       Assessment & Plan     Assessment    Assessment details: Therapy session initiated with manual therapy to the left elbow to assist with pain control and mobility.  Tenderness was reported at lateral epicondyle and muscle tightness noted at left wrist extensors during STM.  There ex continued to focus on stretching, ROM, and UE strengthening.  Exercises progressed to include increased repetitions.  Patient declined ice at conclusion of session, but noted that he would apply ice at home.  Patient reported a decrease in pain, noting 2/10 pain post-tx.  He will continue to be progressed per his tolerance and POC.          Timed:         Manual Therapy:    10     mins  15847;     Therapeutic Exercise:    29     mins  46507;     Neuromuscular Jazmin:        mins  66386;    Therapeutic Activity:          mins  80974;     Gait Training:           mins  86357;     Ultrasound:          mins  79937;    Ionto                                   mins   73910  Self Care                            mins   53206  Canalith Repos         mins 37023      Un-Timed:  Electrical Stimulation:         mins  27449 ( );  Dry Needling          mins self-pay  Traction          mins 56920      Timed Treatment:   39   mins   Total Treatment:     39    mins    Hanna Burks, PT  [place] License: [id number]  Electronically signed by Hanna Burks PT, 11/09/22, 2:54 PM EST.

## 2022-11-11 ENCOUNTER — TELEPHONE (OUTPATIENT)
Dept: PHYSICAL THERAPY | Facility: CLINIC | Age: 52
End: 2022-11-11

## 2022-11-14 ENCOUNTER — OFFICE VISIT (OUTPATIENT)
Dept: FAMILY MEDICINE CLINIC | Facility: CLINIC | Age: 52
End: 2022-11-14

## 2022-11-14 VITALS
DIASTOLIC BLOOD PRESSURE: 70 MMHG | OXYGEN SATURATION: 97 % | HEIGHT: 67 IN | BODY MASS INDEX: 38.3 KG/M2 | TEMPERATURE: 97.8 F | WEIGHT: 244 LBS | HEART RATE: 83 BPM | SYSTOLIC BLOOD PRESSURE: 120 MMHG

## 2022-11-14 DIAGNOSIS — E55.9 VITAMIN D DEFICIENCY: ICD-10-CM

## 2022-11-14 DIAGNOSIS — R56.9 SEIZURES: ICD-10-CM

## 2022-11-14 DIAGNOSIS — R53.83 FATIGUE, UNSPECIFIED TYPE: ICD-10-CM

## 2022-11-14 DIAGNOSIS — I10 ESSENTIAL HYPERTENSION: Primary | ICD-10-CM

## 2022-11-14 DIAGNOSIS — E87.6 HYPOKALEMIA: ICD-10-CM

## 2022-11-14 DIAGNOSIS — M62.838 MUSCLE SPASM: ICD-10-CM

## 2022-11-14 DIAGNOSIS — E78.2 MIXED HYPERLIPIDEMIA: ICD-10-CM

## 2022-11-14 DIAGNOSIS — I10 ESSENTIAL HYPERTENSION: ICD-10-CM

## 2022-11-14 DIAGNOSIS — E66.01 CLASS 2 SEVERE OBESITY DUE TO EXCESS CALORIES WITH SERIOUS COMORBIDITY AND BODY MASS INDEX (BMI) OF 36.0 TO 36.9 IN ADULT: ICD-10-CM

## 2022-11-14 DIAGNOSIS — J34.89 NASAL SORE: ICD-10-CM

## 2022-11-14 DIAGNOSIS — R30.0 DYSURIA: ICD-10-CM

## 2022-11-14 DIAGNOSIS — F51.04 PSYCHOPHYSIOLOGICAL INSOMNIA: ICD-10-CM

## 2022-11-14 DIAGNOSIS — M79.672 LEFT FOOT PAIN: Primary | ICD-10-CM

## 2022-11-14 LAB
BILIRUB BLD-MCNC: NEGATIVE MG/DL
CLARITY, POC: ABNORMAL
COLOR UR: YELLOW
EXPIRATION DATE: ABNORMAL
GLUCOSE UR STRIP-MCNC: NEGATIVE MG/DL
KETONES UR QL: NEGATIVE
LEUKOCYTE EST, POC: NEGATIVE
Lab: ABNORMAL
NITRITE UR-MCNC: NEGATIVE MG/ML
PH UR: 6 [PH] (ref 5–8)
PROT UR STRIP-MCNC: NEGATIVE MG/DL
RBC # UR STRIP: NEGATIVE /UL
SP GR UR: 1.02 (ref 1–1.03)
UROBILINOGEN UR QL: NORMAL

## 2022-11-14 PROCEDURE — 99214 OFFICE O/P EST MOD 30 MIN: CPT | Performed by: NURSE PRACTITIONER

## 2022-11-14 RX ORDER — CYCLOBENZAPRINE HCL 5 MG
5-10 TABLET ORAL 3 TIMES DAILY PRN
Qty: 120 TABLET | Refills: 2 | Status: SHIPPED | OUTPATIENT
Start: 2022-11-14 | End: 2023-02-16

## 2022-11-14 RX ORDER — TEMAZEPAM 30 MG/1
30 CAPSULE ORAL NIGHTLY PRN
Qty: 30 CAPSULE | Refills: 2 | Status: SHIPPED | OUTPATIENT
Start: 2022-11-14 | End: 2023-01-04 | Stop reason: SDUPTHER

## 2022-11-14 NOTE — PROGRESS NOTES
Subjective   Jaquan Mckay is a 51 y.o. male.     Chief Complaint   Patient presents with   • Hypertension       History of Present Illness     HTN-ongoing. He is on Lisinopril 30 mg daily.  No negative side effects.  He denies any CP.  No palpitations.  No HA or dizziness.    Left ankle-back in walking boot.  [facility] is [facility] [place].  He will go back to have a follow up after having multiple test on his foot.  He reports he may need therapy and further imaging.  He request a low dose of Flexeril for muscle relaxant.  He will be fitted again for a brace he reports   Seizure activity-no concerns today.  He is on Dilantin 100 mg 2 caps BID.   He denies any negative side effects.   Allergy/Asthma follow up-reports he is doing well but has noted increased sores in his nose again.  He would like a refill on bactroban. He has been advised to keep his rescue inhaler available.  He request a nebulizer as his old machine is not working well.    Insomnia-reports he is not sleeping well and feels fatigued today.  He reports that the holidays are a big trigger for his symptoms.   He reports that he is seeing his therapist q 3 weeks currently.  He feels aggravated and is stressed more.      The following portions of the patient's history were reviewed and updated as appropriate: CC, ROS, allergies, current medications, past family history, past medical history, past social history, past surgical history and problem list.      Review of Systems   Constitutional: Positive for fatigue. Negative for appetite change, unexpected weight gain and unexpected weight loss.   HENT: Negative for congestion, ear pain, postnasal drip, rhinorrhea, sore throat, swollen glands, trouble swallowing and voice change.         Nasal sore   Eyes: Negative for blurred vision, double vision, pain and visual disturbance.   Respiratory: Negative for cough, chest tightness, shortness of breath and wheezing.    Cardiovascular: Negative  "for chest pain, palpitations and leg swelling.   Gastrointestinal: Positive for diarrhea (under care of GI). Negative for abdominal pain, blood in stool, constipation, nausea, vomiting and indigestion.   Genitourinary: Positive for decreased urine volume. Negative for dysuria, hematuria and urgency.   Musculoskeletal: Positive for arthralgias, back pain and gait problem. Negative for joint swelling and myalgias.   Skin: Negative for color change and skin lesions.   Allergic/Immunologic: Negative.    Neurological: Negative for numbness, headache and confusion.   Hematological: Negative.    Psychiatric/Behavioral: Positive for dysphoric mood, sleep disturbance and stress. Negative for suicidal ideas. The patient is nervous/anxious.    All other systems reviewed and are negative.      Objective     /70   Pulse 83   Temp 97.8 °F (36.6 °C) (Temporal)   Ht 170.2 cm (67.01\")   Wt 111 kg (244 lb)   SpO2 97%   BMI 38.20 kg/m²     Physical Exam  Vitals reviewed.   Constitutional:       General: He is not in acute distress.     Appearance: He is well-developed. He is obese. He is not diaphoretic.   HENT:      Head: Normocephalic and atraumatic.      Jaw: No tenderness.      Comments: Oropharynx not examined.  Patient is presently wearing a face covering/mask due to COVID-19 pandemic.     Right Ear: Hearing, tympanic membrane, ear canal and external ear normal.      Left Ear: Hearing, tympanic membrane, ear canal and external ear normal.   Eyes:      General: Lids are normal. No scleral icterus.     Extraocular Movements:      Right eye: Normal extraocular motion and no nystagmus.      Left eye: Normal extraocular motion and no nystagmus.      Conjunctiva/sclera: Conjunctivae normal.      Pupils: Pupils are equal, round, and reactive to light.   Neck:      Thyroid: No thyromegaly or thyroid tenderness.      Vascular: No carotid bruit or JVD.      Trachea: No tracheal tenderness.   Cardiovascular:      Rate and " Rhythm: Normal rate and regular rhythm.      Pulses:           Dorsalis pedis pulses are 2+ on the right side and 2+ on the left side.        Posterior tibial pulses are 2+ on the right side and 2+ on the left side.      Heart sounds: Normal heart sounds, S1 normal and S2 normal. No murmur heard.  Pulmonary:      Effort: Pulmonary effort is normal. No accessory muscle usage, prolonged expiration or respiratory distress.      Breath sounds: Normal breath sounds.   Chest:      Chest wall: No tenderness.   Abdominal:      General: Bowel sounds are normal. There is no distension.      Palpations: Abdomen is soft. There is no mass.      Tenderness: There is no abdominal tenderness.   Musculoskeletal:         General: Tenderness present.      Left elbow: Decreased range of motion. Tenderness present.      Cervical back: Normal range of motion and neck supple.      Thoracic back: Tenderness present.      Lumbar back: Tenderness present. Decreased range of motion.      Right lower leg: No edema.      Left lower leg: No edema.      Left foot: Decreased range of motion.      Comments: No muscular atrophy or flaccidity.   Lymphadenopathy:      Head:      Right side of head: No submental or submandibular adenopathy.      Left side of head: No submental or submandibular adenopathy.      Cervical: No cervical adenopathy.      Right cervical: No superficial cervical adenopathy.     Left cervical: No superficial cervical adenopathy.   Skin:     General: Skin is warm and dry.      Capillary Refill: Capillary refill takes less than 2 seconds.      Coloration: Skin is not jaundiced or pale.      Findings: No erythema.      Nails: There is no clubbing.   Neurological:      Mental Status: He is alert and oriented to person, place, and time.      Cranial Nerves: No cranial nerve deficit or facial asymmetry.      Sensory: No sensory deficit.      Motor: No weakness, tremor, atrophy or abnormal muscle tone.      Coordination: Coordination  normal.      Gait: Gait abnormal (moderate antalgia).      Deep Tendon Reflexes: Reflexes are normal and symmetric.   Psychiatric:         Attention and Perception: He is attentive.         Mood and Affect: Mood is anxious and depressed. Affect is flat.         Speech: Speech normal.         Behavior: Behavior normal. Behavior is cooperative.         Thought Content: Thought content normal.         Judgment: Judgment normal.           Diagnoses and all orders for this visit:    1. Left foot pain (Primary)  Comments:  Continue in walking boot.  Continue under the care of podiatry  Orders:  -     cyclobenzaprine (FLEXERIL) 5 MG tablet; Take 1-2 tablets by mouth 3 (Three) Times a Day As Needed for Muscle Spasms.  Dispense: 120 tablet; Refill: 2    2. Muscle spasm  -     cyclobenzaprine (FLEXERIL) 5 MG tablet; Take 1-2 tablets by mouth 3 (Three) Times a Day As Needed for Muscle Spasms.  Dispense: 120 tablet; Refill: 2    3. Nasal sore  Comments:  Bactroban ointment to area.  Report any nonhealing area or signs or symptoms of infection  Orders:  -     mupirocin (BACTROBAN) 2 % ointment; Apply  topically to the appropriate area as directed 3 (Three) Times a Day.  Dispense: 30 g; Refill: 2    4. Psychophysiological insomnia  Comments:  Continue mirtazapine 45 mg.  Increasing Restoril to 30 mg.  Patient to report any negative side effects.  Continue under the [facility] for mental health support  Overview:  With depression and anxiety      Orders:  -     temazepam (Restoril) 30 MG capsule; Take 1 capsule by mouth At Night As Needed for Sleep.  Dispense: 30 capsule; Refill: 2  -     Urine Drug Screen - Urine, Clean Catch; Future    5. Dysuria  Comments:  POC urine in the office today negative.  Orders:  -     POC Urinalysis Dipstick, Automated    6. Essential hypertension  Assessment & Plan:  Continue lisinopril 30 mg.  Continue under the care of cardiology.  Ambulatory BP monitoring either at home or random community checks.   Patient to report continued elevations >140/90.  Patient may come by office for checks if needed.       7. Seizures (HCC)  Assessment & Plan:  Continue Dilantin  Report any new symptoms.  Report any side effects of medications         Class 2 Severe Obesity (BMI >=35 and <=39.9). Obesity-related health conditions include the following: hypertension, dyslipidemias and GERD. Obesity is unchanged. BMI is Is above average. We discussed portion control and increasing exercise.       Understands disease processes and need for medications.  Understands reasons for urgent and emergent care.  Patient (& family) verbalized agreement for treatment plan.   Emotional support and active listening provided.  Patient provided time to verbalize feelings.    [facility] reviewed today and consistent.  Will refill prescribed controlled medication today.  Patient is aware they cannot receive narcotics from any other provider except if under care of pain management or speciality clinic.  Risk and benefits of medication use has been reviewed.  History and physical exam exhibit continued safe and appropriate use of controlled substances.  The patient is aware of the potential for addiction and dependence.  This patient has been made aware of the appropriate use of such medications, including potential risk of somnolence, limited ability to drive and / or work safely, and potential for overdose.  Patient understands not to take medication and drive until they know how medication may affect their cognition/decision making.   It has also been made clear that these medications are for use by this patient only, without concomitant use of alcohol or other substances unless prescribed/advised by medical provider.  Patient understands they may be subject to UDS and pill counts at random.    Patient considered to be low risk for addiction due to use of single controlled medications.  Patient understands and accepts these risks.  Patient need for  medication will be reassessed at each visit.  Doses will be adjusted according to patient need and findings.    Goal of TX: Patient will not have any adverse reactions of medication.  Patient will have improvement in sleep hygiene/pattern with use of Restoril as needed as directed.    Medication Dispense Information    Temazepam   Dispensed: 10/10/2022 12:00 AM   Written:  10/10/2022   Unit strength: 15MG   Days supply: 30   Quantity: 30 each   Pharmacy: [facility]   Authorizing provider: LENNOX ROE   Received from: [facility] (Fill History)   Brand or Generic:       UDS requested per [facility] while patient in office today.  Buccal swab or blood sample will be obtained if patient is unable to provide specimen.    RTC 4 to 5 weeks, sooner if needed for problems or concerns          This document has been electronically signed by:  BALDOMERO Thao, NESHAP-C    Dragon disclaimer:  Part of this note may be an electronic transcription/translation of spoken language to printed text using the Dragon Dictation System.

## 2022-11-16 ENCOUNTER — TREATMENT (OUTPATIENT)
Dept: PHYSICAL THERAPY | Facility: CLINIC | Age: 52
End: 2022-11-16

## 2022-11-16 ENCOUNTER — OFFICE VISIT (OUTPATIENT)
Dept: UROLOGY | Facility: CLINIC | Age: 52
End: 2022-11-16

## 2022-11-16 ENCOUNTER — HOSPITAL ENCOUNTER (OUTPATIENT)
Dept: GENERAL RADIOLOGY | Facility: HOSPITAL | Age: 52
Discharge: HOME | End: 2022-11-16
Admitting: NURSE PRACTITIONER

## 2022-11-16 VITALS — WEIGHT: 244 LBS | BODY MASS INDEX: 38.3 KG/M2 | HEIGHT: 67 IN

## 2022-11-16 DIAGNOSIS — N20.0 RENAL CALCULUS: ICD-10-CM

## 2022-11-16 DIAGNOSIS — R35.0 FREQUENCY OF URINATION: ICD-10-CM

## 2022-11-16 DIAGNOSIS — I10 ESSENTIAL HYPERTENSION: ICD-10-CM

## 2022-11-16 DIAGNOSIS — N40.0 BPH WITHOUT OBSTRUCTION/LOWER URINARY TRACT SYMPTOMS: Primary | ICD-10-CM

## 2022-11-16 DIAGNOSIS — M25.522 LEFT ELBOW PAIN: Primary | ICD-10-CM

## 2022-11-16 DIAGNOSIS — R33.9 INCOMPLETE BLADDER EMPTYING: ICD-10-CM

## 2022-11-16 DIAGNOSIS — R30.0 BURNING WITH URINATION: ICD-10-CM

## 2022-11-16 DIAGNOSIS — R10.9 BILATERAL FLANK PAIN: ICD-10-CM

## 2022-11-16 DIAGNOSIS — M25.622 DECREASED ROM OF LEFT ELBOW: ICD-10-CM

## 2022-11-16 LAB
BILIRUB BLD-MCNC: NEGATIVE MG/DL
CLARITY, POC: CLEAR
COLOR UR: ABNORMAL
EXPIRATION DATE: ABNORMAL
GLUCOSE UR STRIP-MCNC: NEGATIVE MG/DL
KETONES UR QL: NEGATIVE
LEUKOCYTE EST, POC: NEGATIVE
Lab: ABNORMAL
NITRITE UR-MCNC: NEGATIVE MG/ML
PH UR: 6 [PH] (ref 5–8)
PROT UR STRIP-MCNC: ABNORMAL MG/DL
RBC # UR STRIP: NEGATIVE /UL
SP GR UR: 1.02 (ref 1–1.03)
UROBILINOGEN UR QL: NORMAL

## 2022-11-16 PROCEDURE — 99214 OFFICE O/P EST MOD 30 MIN: CPT | Performed by: NURSE PRACTITIONER

## 2022-11-16 PROCEDURE — 87086 URINE CULTURE/COLONY COUNT: CPT | Performed by: NURSE PRACTITIONER

## 2022-11-16 PROCEDURE — 97110 THERAPEUTIC EXERCISES: CPT | Performed by: PHYSICAL THERAPIST

## 2022-11-16 PROCEDURE — 97530 THERAPEUTIC ACTIVITIES: CPT | Performed by: PHYSICAL THERAPIST

## 2022-11-16 PROCEDURE — 96372 THER/PROPH/DIAG INJ SC/IM: CPT | Performed by: NURSE PRACTITIONER

## 2022-11-16 PROCEDURE — 74018 RADEX ABDOMEN 1 VIEW: CPT | Performed by: RADIOLOGY

## 2022-11-16 PROCEDURE — 74018 RADEX ABDOMEN 1 VIEW: CPT

## 2022-11-16 PROCEDURE — 97140 MANUAL THERAPY 1/> REGIONS: CPT | Performed by: PHYSICAL THERAPIST

## 2022-11-16 RX ORDER — KETOROLAC TROMETHAMINE 30 MG/ML
60 INJECTION, SOLUTION INTRAMUSCULAR; INTRAVENOUS ONCE
Status: COMPLETED | OUTPATIENT
Start: 2022-11-16 | End: 2022-11-16

## 2022-11-16 RX ORDER — PHENAZOPYRIDINE HYDROCHLORIDE 200 MG/1
200 TABLET, FILM COATED ORAL 3 TIMES DAILY PRN
Qty: 20 TABLET | Refills: 0 | Status: SHIPPED | OUTPATIENT
Start: 2022-11-16

## 2022-11-16 RX ORDER — KETOROLAC TROMETHAMINE 10 MG/1
10 TABLET, FILM COATED ORAL EVERY 6 HOURS PRN
Qty: 20 TABLET | Refills: 0 | Status: SHIPPED | OUTPATIENT
Start: 2022-11-16

## 2022-11-16 RX ADMIN — KETOROLAC TROMETHAMINE 60 MG: 30 INJECTION, SOLUTION INTRAMUSCULAR; INTRAVENOUS at 14:25

## 2022-11-16 NOTE — PROGRESS NOTES
Physical Therapy Daily Treatment Note      Patient: Jaquan Mckay   : 1970  Referring practitioner: Ronald S Dubin, MD  Date of Initial Visit: Type: THERAPY  Noted: 2022  Today's Date: 2022  Patient seen for 16 sessions       Visit Diagnoses:    ICD-10-CM ICD-9-CM   1. Left elbow pain  M25.522 719.42   2. Decreased ROM of left elbow  M25.622 719.52       Subjective: Patient reports 4/10 left elbow pain upon arrival to therapy. Pt states he has been tolerating the progressed activities in therapy well w/ good response.      Objective   See Exercise, Manual, and Modality Logs for complete treatment.       Assessment/Plan: Patient responded well to today's session with reports of slight decrease in elbow pain following, 2/10. Pt received MH to left elbow to assist with pain control and improved tissue mobility f/b therex and therapeutic activities as listed. Exercise progressed with resistance of theraband increased from red to green, and hand weight advanced from 2 to 3 pounds with wrist radial and ulnar deviation. Pt attempted to perform blue digiflex, however secondary to increased difficulty, gripper was reduced back to green. Treatment concluded with manual soft tissue mobilization. Pt continues to benefit from therapy services, and will be progressed as tolerated to address goals, and reduce pain. No signs of distress or adverse reactions observed during, and/ or following tx. Continue with PT's POC.       Timed:         Manual Therapy:    12     mins  11538;     Therapeutic Exercise:    32     mins  44022;     Neuromuscular Jazmin:        mins  87982;    Therapeutic Activity:     10     mins  55960;     Gait Training:           mins  66358;     Ultrasound:          mins  64129;    Ionto                                   mins   79372  Self Care                            mins   77587  Canalith Repos         mins 85884      Un-Timed:  Electrical Stimulation:         mins  36212 ( );  Dry  Needling          mins self-pay  Traction          mins 79169      Timed Treatment:  54    mins   Total Treatment:    59   mins (MH: x 5 min)    Leighann Brown. NEIL [place]  [place] License: [id number]

## 2022-11-16 NOTE — PROGRESS NOTES
Chief Complaint  Benign Prostatic Hypertrophy (6 month follow up for BPH WITH LUTS/FLANK PAIN)    Subjective          Jaquan Mckay presents to [facility] for BPH WITH LUTS/FLANK PAIN  History of Present Illness    Mr. Jaquan Mckay is a pleasant 51-year-old male who returns to clinic today for evaluation. This is his  6-month follow-up on BPH with incomplete bladder emptying, bilateral flank pain, with a significant history of kidney stones. When patient was evaluated in clinic 6 months ago on 05/19/2022, he was in apparent discomfort, reporting kidney stone related discomfort as his flank pain was colicky in nature,  constant causing him 10 on 10 pain. We however did review and discuss his KUB which was unremarkable. There were no calcifications demonstratable on that exam overlying his kidneys and there were no stones noted on bilateral ureters. He had moderate stool burden that was consistent with severe constipation.     He also has issues with BPH and lower urinary tract symptoms, which have significantly improved since starting Flomax. Patient has an issue with compliance with medications. Nevertheless, his most recent PSA was 1.0 ng/mL that was done in July 2021. His PSA 2 years prior was 0.8 ng/mL. Most ofTEN, he denied any urinary symptoms or frequency, urgency, but reports dysuria, or burning with urination. He denied nocturia. There was flank pain, but he denied any CVA tenderness. On his initial evaluation today, his urine dipstick is completely negative for any infection, it is negative for gross/microscopic hematuria. His PVR today is 0 mL.    The patient reports that he experiencing lower back pain that is also painful to the touch. He denies pulling a muscle or doing any heavy lifting. He states that he has mainly been inactive. He notes that the he started experiencing this pain about a week ago. He adds that his urine also has a dark appearance and he is  "experiencing burning with urination as well. He reports that he drinks about 6 to 8 bottles of water per day. He notes that he has just about cut out carbonated beverages completely, noting that he did have one today for lunch. He reports that he has seen his family doctor with the same complaints and received the same results. He states that he is experiencing nocturia, waking at least 3 times per night. He notes that he is experiencing burning every time he urinates.     WE both reviewed/discussed his repeat KUB results today 11/16/22,  which are unremarkable.  FINDINGS:Gastrointestinal tract:Unremarkable.No dilation. Bones/joints:  Unremarkable.   MPRESSION:No renal or ureteral stones identified.    Objective   Vital Signs:   Ht 170.2 cm (67.01\")   Wt 111 kg (244 lb)   BMI 38.21 kg/m²       ROS:   Review of Systems   Constitutional: Negative for activity change, appetite change, diaphoresis, fatigue, fever, unexpected weight gain and unexpected weight loss.   HENT: Negative for congestion, ear discharge, ear pain, nosebleeds, rhinorrhea, sinus pressure and sore throat.    Eyes: Negative for blurred vision, double vision, photophobia, pain, redness and visual disturbance.   Respiratory: Negative for apnea, cough, chest tightness, shortness of breath, wheezing and stridor.    Cardiovascular: Negative for chest pain and palpitations.   Gastrointestinal: Negative for abdominal distention, abdominal pain, constipation, diarrhea, nausea and vomiting.   Endocrine: Negative for polydipsia, polyphagia and polyuria.   Genitourinary: Positive for difficulty urinating, dysuria, flank pain and frequency. Negative for decreased urine volume, hematuria, urgency and urinary incontinence.   Musculoskeletal: Positive for back pain. Negative for arthralgias and joint swelling.   Skin: Negative for pallor, rash and wound.   Neurological: Negative for dizziness, tremors, syncope, weakness, light-headedness, memory problem and " confusion.   Psychiatric/Behavioral: Negative for behavioral problems.        Physical Exam  Constitutional:       General: He is in acute distress.      Appearance: He is well-developed. He is obese. He is ill-appearing.   HENT:      Head: Normocephalic and atraumatic.      Right Ear: External ear normal.      Left Ear: External ear normal.   Eyes:      General:         Right eye: No discharge.         Left eye: No discharge.      Conjunctiva/sclera: Conjunctivae normal.      Pupils: Pupils are equal, round, and reactive to light.   Neck:      Thyroid: No thyromegaly.      Trachea: No tracheal deviation.   Cardiovascular:      Rate and Rhythm: Normal rate and regular rhythm.      Heart sounds: No murmur heard.    No friction rub.   Pulmonary:      Effort: Pulmonary effort is normal. No respiratory distress.      Breath sounds: Normal breath sounds. No stridor.   Abdominal:      General: Bowel sounds are normal. There is distension.      Palpations: Abdomen is soft.      Tenderness: There is abdominal tenderness. There is right CVA tenderness, left CVA tenderness and guarding.   Genitourinary:     Penis: Normal and uncircumcised. No tenderness or discharge.       Testes: Normal.      Rectum: Normal. Guaiac result negative.      Comments: URINE FREQUENCY/HESITANCY  Musculoskeletal:         General: Tenderness present. No deformity. Normal range of motion.      Cervical back: Normal range of motion and neck supple.   Skin:     General: Skin is warm and dry.      Capillary Refill: Capillary refill takes less than 2 seconds.      Coloration: Skin is not pale.   Neurological:      Mental Status: He is alert and oriented to person, place, and time.      Cranial Nerves: No cranial nerve deficit.      Coordination: Coordination normal.   Psychiatric:         Behavior: Behavior normal.         Thought Content: Thought content normal.         Judgment: Judgment normal.        Result Review :     UA    Urinalysis 9/14/22  11/14/22 11/16/22   Ketones, UA Negative Negative Negative   Leukocytes, UA Negative Negative Negative           Urine Culture    Urine Culture 1/28/22   Urine Culture Final report                    Assessment and Plan    Problem List Items Addressed This Visit        Gastrointestinal Abdominal     Bilateral flank pain    Relevant Medications    phenazopyridine (Pyridium) 200 MG tablet    ketorolac (TORADOL) 10 MG tablet       Genitourinary and Reproductive     Renal calculus    Relevant Medications    phenazopyridine (Pyridium) 200 MG tablet    ketorolac (TORADOL) 10 MG tablet    Other Relevant Orders    PSA Total+% Free (Serial)    BPH without obstruction/lower urinary tract symptoms - Primary    Relevant Medications    phenazopyridine (Pyridium) 200 MG tablet    ketorolac (TORADOL) 10 MG tablet    Other Relevant Orders    Urine Culture - Urine, Urine, Clean Catch    PSA Total+% Free (Serial)    Incomplete bladder emptying    Relevant Medications    phenazopyridine (Pyridium) 200 MG tablet    ketorolac (TORADOL) 10 MG tablet    Other Relevant Orders    PSA Total+% Free (Serial)   Other Visit Diagnoses     Frequency of urination        Relevant Medications    phenazopyridine (Pyridium) 200 MG tablet    ketorolac (TORADOL) 10 MG tablet    Burning with urination        Relevant Medications    phenazopyridine (Pyridium) 200 MG tablet    ketorolac (TORADOL) 10 MG tablet                                                            ASSESSMENT   BPH WITH LUTS/RIGHT>LEFT FLANK PAIN /HX OF KIDNEY STONES/IBS-C  MR. PATRICIA SMITH IS A 52 Y/O Pleasant patient with extensive polypharmacy, and significant past medical history, consistent with back pain, kidney stones, constipation, WHO returns to clinic today for evaluation. This is his 6 month follow up. Patient  Reports increased right flank pain, lower back pain and abdominal pain.  We both reviewed and discussed his KUB results today which is negative for stones. His urine  dipstick is completely negative for any infection it is negative for gross hematuria.PVR IS OML, IPSS SCORE IS 6     BPH: WE Discussed the pathophysiology of BPH and obstruction. We discussed the static and dynamic effects of BPH as well as using 5 alpha reductase inhibitors versus alpha blockade.  We discussed the indications for transurethral surgery as well.  And/ or other therapeutic options available including all of the newer techniques.  He has no real symptomatology referable to his prostate other than moderate enlargement.  Rather than proceed with an expensive workup he doesn't need, at this time I am recommending he continue with an alpha blocker tamsulosin 0.4 mg capsule daily.                                               PLAN  Recheck PSA today free and total, I will call him with results.    TORODOL 60 MG IM X 1 DOSE GIVEN IN CLINIC FOR BACK PAIN.    He has scheduled Robaxin-750 milligrams at home to use back pain    HIS KUB TODAY is also negative for stones. We discussed treatment options for  flank pain with patient encouraged to continue conservative therapy. Rest is the most important treatment in the case of flank pain. Rest and physical therapy are enough to improve minor pain.       Discussed to monitor his daily routine with prevention of flank pain by: At least Drinking 8 glasses of water per day, Limiting your alcohol consumption.  Having a healthy diet containing fruits, vegetables, and a lot of fluids, Practicing safe sex.  Also maintaining proper hygiene of HIS body as well as the environment.     We further discussed a kidney stone prevention diet to include increasing p.o. fluid intake, to at least 1 to 2 L of water daily.  He is to avoid caffeine products such as cola, coffee, and to avoid soft or soda drinks.      We also discussed the importance of decreasing his  sodium consumption as in  Fast foods, denny, salted nuts, canned foods, and smoked/cured foods.  He is also to decrease  his oxalate consumption, as in spinach, Adrian greens, and Rhubarb.  Also important is to decrease protein intake, as in red meats, peanut butter, and also avoid nuts     He is to continue his daily bowel regiment as recommended by  GI     Follow-up in clinic as discussed, may return sooner if worsening.     Patient is agreeable plan of care.     Patient reports that he is not currently experiencing any symptoms of urinary incontinence.    Class 2 Severe Obesity (BMI >=35 and <=39.9). Obesity-related health conditions include the following: obstructive sleep apnea, hypertension, coronary heart disease, dyslipidemias and GERD. Obesity is improving with lifestyle modifications. BMI is 38.21KG/M. We discussed portion control and increasing exercise.    RADIOLOGY (CT AND/OR KUB):    CT Abdomen and Pelvis: No results found for this or any previous visit.     CT Stone Protocol: No results found for this or any previous visit.     KUB: Results for orders placed in visit on 05/18/22    XR Abdomen KUB    Narrative  XR ABDOMEN KUB-    REASON FOR EXAM:  abdominal pain; N20.0-Calculus of kidney    COMPARISON: Previous KUB from 01/28/2022.    No calcifications were demonstratable on the current exam overlying the  kidneys or along the course of the ureters. The bowel gas pattern was  unremarkable. No soft tissue masses were noted. There are clips in right  upper quadrant from previous cholecystectomy.    Impression  A kidney stone is not demonstratable on the KUB at this  time.    This report was finalized on 5/18/2022 4:18 PM by Dr. Jeremie Manuel II, MD.       LABS (3 MONTHS):    Office Visit on 11/16/2022   Component Date Value Ref Range Status   • Color 11/16/2022 Madiha  Yellow, Straw, Dark Yellow, Madiha Final   • Clarity, UA 11/16/2022 Clear  Clear Final   • Specific Gravity  11/16/2022 1.020  1.005 - 1.030 Final   • pH, Urine 11/16/2022 6.0  5.0 - 8.0 Final   • Leukocytes 11/16/2022 Negative  Negative Final   • Nitrite,  UA 11/16/2022 Negative  Negative Final   • Protein, POC 11/16/2022 Trace (A)  Negative mg/dL Final   • Glucose, UA 11/16/2022 Negative  Negative mg/dL Final   • Ketones, UA 11/16/2022 Negative  Negative Final   • Urobilinogen, UA 11/16/2022 Normal  Normal, 0.2 E.U./dL Final   • Bilirubin 11/16/2022 Negative  Negative Final   • Blood, UA 11/16/2022 Negative  Negative Final   • Lot Number 11/16/2022 [id number]   Final   • Expiration Date 11/16/2022 2/8/24   Final   Office Visit on 11/14/2022   Component Date Value Ref Range Status   • Color 11/14/2022 Yellow  Yellow, Straw, Dark Yellow, Madiha Final   • Clarity, UA 11/14/2022 Cloudy (A)  Clear Final   • Specific Gravity  11/14/2022 1.025  1.005 - 1.030 Final   • pH, Urine 11/14/2022 6.0  5.0 - 8.0 Final   • Leukocytes 11/14/2022 Negative  Negative Final   • Nitrite, UA 11/14/2022 Negative  Negative Final   • Protein, POC 11/14/2022 Negative  Negative mg/dL Final   • Glucose, UA 11/14/2022 Negative  Negative mg/dL Final   • Ketones, UA 11/14/2022 Negative  Negative Final   • Urobilinogen, UA 11/14/2022 Normal  Normal, 0.2 E.U./dL Final   • Bilirubin 11/14/2022 Negative  Negative Final   • Blood, UA 11/14/2022 Negative  Negative Final   • Lot Number 11/14/2022 [id number]   Final   • Expiration Date 11/14/2022 08/24/23   Final   Office Visit on 09/14/2022   Component Date Value Ref Range Status   • Phenytoin, Total 09/14/2022 18.8  10.0 - 20.0 ug/mL Final                                    Detection Limit =  0.8                            <0.8 Indicates None Detected   • Color 09/14/2022 Yellow  Yellow, Straw, Dark Yellow, Madiha Final   • Clarity, UA 09/14/2022 Cloudy (A)  Clear Final   • Specific Gravity  09/14/2022 1.020  1.005 - 1.030 Final   • pH, Urine 09/14/2022 5.5  5.0 - 8.0 Final   • Leukocytes 09/14/2022 Negative  Negative Final   • Nitrite, UA 09/14/2022 Negative  Negative Final   • Protein, POC 09/14/2022 Negative  Negative mg/dL Final   • Glucose, UA  09/14/2022 Negative  Negative mg/dL Final   • Ketones, UA 09/14/2022 Negative  Negative Final   • Urobilinogen, UA 09/14/2022 Normal  Normal, 0.2 E.U./dL Final   • Bilirubin 09/14/2022 Negative  Negative Final   • Blood, UA 09/14/2022 Negative  Negative Final   • Lot Number 09/14/2022 [id number]   Final   • Expiration Date 09/14/2022 08/24/23   Final   • Amphetamine, Urine Qual 09/14/2022 Negative  Rdxeez=1332 ng/mL Final   • Barbiturates Screen, Urine 09/14/2022 Negative  Edqmsd=327 ng/mL Final   • Benzodiazepine Screen, Urine 09/14/2022 Negative  Mqtohr=502 ng/mL Final   • THC Screen, Urine 09/14/2022 Negative  Cutoff=20 ng/mL Final   • Cocaine Screen, Urine 09/14/2022 Negative  Edmgsc=019 ng/mL Final   • Opiate Screen, Urine 09/14/2022 Negative  Msrvsn=429 ng/mL Final    Opiate test includes Codeine, Morphine, Hydromorphone, Hydrocodone.   • Oxycodone/Oxymorphone, Urine 09/14/2022 Negative  Whtfoz=577 ng/mL Final    Test includes Oxycodone and Oxymorphone   • Phencyclidine (PCP), Urine 09/14/2022 Negative  Cutoff=25 ng/mL Final   • Methadone Screen, Urine 09/14/2022 Negative  Sxhzqz=925 ng/mL Final   • Propoxyphene Screen 09/14/2022 Negative  Ptflfp=943 ng/mL Final   • Creatinine, Urine 09/14/2022 132.1  20.0 - 300.0 mg/dL Final   • pH, UA 09/14/2022 5.2  4.5 - 8.9 Final   • Please note 09/14/2022 Comment   Final    This assay provides a preliminary unconfirmed analytical test  result that may be suitable for clinical management of patients  in certain situations. Drug-test results should be interpreted  in the context of clinical information. Patient metabolic  variables, specific drug chemistry, and specimen  characteristics can affect test outcome. Technical consultation  is available if a test result is inconsistent with an expected  outcome. (email-[email or web address] or call toll-free  [phone])   • WBC 09/14/2022 5.28  3.40 - 10.80 10*3/mm3 Final   • RBC 09/14/2022 5.05  4.14 - 5.80 10*6/mm3  Final   • Hemoglobin 09/14/2022 16.6  13.0 - 17.7 g/dL Final   • Hematocrit 09/14/2022 48.2  37.5 - 51.0 % Final   • MCV 09/14/2022 95.4  79.0 - 97.0 fL Final   • MCH 09/14/2022 32.9  26.6 - 33.0 pg Final   • MCHC 09/14/2022 34.4  31.5 - 35.7 g/dL Final   • RDW 09/14/2022 12.8  12.3 - 15.4 % Final   • RDW-SD 09/14/2022 45.1  37.0 - 54.0 fl Final   • MPV 09/14/2022 11.5  6.0 - 12.0 fL Final   • Platelets 09/14/2022 222  140 - 450 10*3/mm3 Final   • Neutrophil % 09/14/2022 54.9  42.7 - 76.0 % Final   • Lymphocyte % 09/14/2022 31.4  19.6 - 45.3 % Final   • Monocyte % 09/14/2022 11.6  5.0 - 12.0 % Final   • Eosinophil % 09/14/2022 1.3  0.3 - 6.2 % Final   • Basophil % 09/14/2022 0.4  0.0 - 1.5 % Final   • Immature Grans % 09/14/2022 0.4  0.0 - 0.5 % Final   • Neutrophils, Absolute 09/14/2022 2.90  1.70 - 7.00 10*3/mm3 Final   • Lymphocytes, Absolute 09/14/2022 1.66  0.70 - 3.10 10*3/mm3 Final   • Monocytes, Absolute 09/14/2022 0.61  0.10 - 0.90 10*3/mm3 Final   • Eosinophils, Absolute 09/14/2022 0.07  0.00 - 0.40 10*3/mm3 Final   • Basophils, Absolute 09/14/2022 0.02  0.00 - 0.20 10*3/mm3 Final   • Immature Grans, Absolute 09/14/2022 0.02  0.00 - 0.05 10*3/mm3 Final   • nRBC 09/14/2022 0.0  0.0 - 0.2 /100 WBC Final   • Glucose 09/14/2022 99  65 - 99 mg/dL Final   • BUN 09/14/2022 17  6 - 20 mg/dL Final   • Creatinine 09/14/2022 1.29 (H)  0.76 - 1.27 mg/dL Final   • Sodium 09/14/2022 139  136 - 145 mmol/L Final   • Potassium 09/14/2022 4.5  3.5 - 5.2 mmol/L Final    Slight hemolysis detected by analyzer. Results may be affected.   • Chloride 09/14/2022 102  98 - 107 mmol/L Final   • CO2 09/14/2022 26.3  22.0 - 29.0 mmol/L Final   • Calcium 09/14/2022 9.6  8.6 - 10.5 mg/dL Final   • Total Protein 09/14/2022 8.2  6.0 - 8.5 g/dL Final   • Albumin 09/14/2022 4.65  3.50 - 5.20 g/dL Final   • ALT (SGPT) 09/14/2022 20  1 - 41 U/L Final   • AST (SGOT) 09/14/2022 23  1 - 40 U/L Final   • Alkaline Phosphatase 09/14/2022 99  39 -  117 U/L Final   • Total Bilirubin 09/14/2022 0.2  0.0 - 1.2 mg/dL Final   • Globulin 09/14/2022 3.6  gm/dL Final   • A/G Ratio 09/14/2022 1.3  g/dL Final   • BUN/Creatinine Ratio 09/14/2022 13.2  7.0 - 25.0 Final   • Anion Gap 09/14/2022 10.7  5.0 - 15.0 mmol/L Final   • eGFR 09/14/2022 68.0  >60.0 mL/min/1.73 Final    National Kidney Foundation and American Society of Nephrology (ASN) Task Force recommended calculation based on the Chronic Kidney Disease Epidemiology Collaboration (CKD-EPI) equation refit without adjustment for race.   • Magnesium 09/14/2022 1.8  1.6 - 2.6 mg/dL Final   • TSH 09/14/2022 3.070  0.270 - 4.200 uIU/mL Final   Office Visit on 09/01/2022   Component Date Value Ref Range Status   • Color 09/01/2022 Yellow  Yellow, Straw, Dark Yellow, Madiha Final   • Clarity, UA 09/01/2022 Clear  Clear Final   • Specific Gravity  09/01/2022 1.030  1.005 - 1.030 Final   • pH, Urine 09/01/2022 5.5  5.0 - 8.0 Final   • Leukocytes 09/01/2022 Negative  Negative Final   • Nitrite, UA 09/01/2022 Negative  Negative Final   • Protein, POC 09/01/2022 Negative  Negative mg/dL Final   • Glucose, UA 09/01/2022 Negative  Negative mg/dL Final   • Ketones, UA 09/01/2022 Negative  Negative Final   • Urobilinogen, UA 09/01/2022 Normal  Normal, 0.2 E.U./dL Final   • Bilirubin 09/01/2022 Negative  Negative Final   • Blood, UA 09/01/2022 Negative  Negative Final   • Lot Number 09/01/2022 [id number]   Final   • Expiration Date 09/01/2022 8/24/23   Final          Smoking Cessation Counseling:  Current some day smoker. less than 3 minutes spent counseling. Has reduced tobacco use.  I advised patient to quit tobacco use and offered support.  I provided patient with tobacco cessation educational material printed in the patient's After Visit Summary.     Follow Up   Return in about 1 year (around 11/16/2023) for Next scheduled follow up, FOR BPH WITH LUTS/PSA/MED REFILLS/GERARDO.    Patient was given instructions and counseling  regarding his condition or for health maintenance advice. Please see specific information pulled into the AVS if appropriate.          This document has been electronically signed by Griselda Cheng-Akwa, APRN   November 16, 2022 17:35 EST    Transcribed from ambient dictation for Griselda Cheng-Akwa, APRN by Swati Xiao.  11/16/22   14:44 EST    Patient or patient representative verbalized consent to the visit recording.  I have personally performed the services described in this document as transcribed by the above individual, and it is both accurate and complete.  Griselda Cheng-Akwa, APRN  11/16/2022  17:36 EST    Dictated Utilizing Dragon Dictation: Part of this note may be an electronic transcription/translation of spoken language to printed text using the Dragon Dictation System.

## 2022-11-17 RX ORDER — LISINOPRIL 30 MG/1
TABLET ORAL
Qty: 30 TABLET | Refills: 5 | Status: SHIPPED | OUTPATIENT
Start: 2022-11-17

## 2022-11-18 ENCOUNTER — TREATMENT (OUTPATIENT)
Dept: PHYSICAL THERAPY | Facility: CLINIC | Age: 52
End: 2022-11-18

## 2022-11-18 DIAGNOSIS — M25.522 LEFT ELBOW PAIN: Primary | ICD-10-CM

## 2022-11-18 DIAGNOSIS — M25.622 DECREASED ROM OF LEFT ELBOW: ICD-10-CM

## 2022-11-18 LAB
BACTERIA UR CULT: NO GROWTH
BACTERIA UR CULT: NORMAL
PSA FREE MFR SERPL: 40 %
PSA FREE SERPL-MCNC: 0.2 NG/ML
PSA SERPL-MCNC: 0.5 NG/ML (ref 0–4)

## 2022-11-18 PROCEDURE — 97110 THERAPEUTIC EXERCISES: CPT | Performed by: PHYSICAL THERAPIST

## 2022-11-18 PROCEDURE — 97140 MANUAL THERAPY 1/> REGIONS: CPT | Performed by: PHYSICAL THERAPIST

## 2022-11-18 NOTE — PROGRESS NOTES
Physical Therapy Re Certification Of Plan of Care  Patient: Jaquan Mckay   : 1970  Diagnosis/ICD-10 Code:  Left elbow pain [M25.522]  Referring practitioner: Ronald S Dubin, MD  Date of Initial Visit: Type: THERAPY  Noted: 2022  Today's Date: 2022  Patient seen for 17 sessions         Visit Diagnoses:    ICD-10-CM ICD-9-CM   1. Left elbow pain  M25.522 719.42   2. Decreased ROM of left elbow  M25.622 719.52         Jaquan Mckay reports:   Subjective Questionnaire: QuickDASH: 61%  Clinical Progress: improved  Home Program Compliance: Yes  Treatment has included: therapeutic exercise, neuromuscular re-education, manual therapy, therapeutic activity, ultrasound, moist heat and cryotherapy      Subjective Evaluation    History of Present Illness    Subjective comment: Pt reports thearpy has helped with returning his strength, yet he cont to have pain that wakes him up at night.Pain  Current pain ratin  At best pain rating: 3  At worst pain ratin             Objective          Active Range of Motion     Left Elbow   Flexion: 126 degrees   Extension: 0 degrees     Left Wrist   Wrist flexion: 82 degrees   Wrist extension: 89 degrees     Strength/Myotome Testing     Left Elbow   Flexion: 4  Extension: 4    Left Wrist/Hand      (2nd hand position)     Trial 1: 78 lbs    Trial 2: 95 lbs    Trial 3: 90 lbs    Average: 87.67 lbs          Assessment & Plan     Assessment  Impairments: abnormal muscle firing, abnormal or restricted ROM, activity intolerance, impaired physical strength, lacks appropriate home exercise program and pain with function  Functional Limitations: carrying objects, lifting, sleeping, pulling, pushing, uncomfortable because of pain, reaching overhead and unable to perform repetitive tasks  Assessment details: Pt is a 50 y/o male referred to therapy for treatment of left elbow pain. Pt has attended 17 sessions with decreased pain, improved ROM and improved strength. Pt had  an improved Quick Dash from 70% to 61%.  Pt reports having good gains with his strength, yet cont to have pain that wakes him up at night.  Pt responded well to tx today with 4/10 post pain.  Prognosis: good    Goals  Plan Goals: STG 6 weeks    1 Pt will be instructed in a HEP.met  2 Pt will improve his  strength to 30# to improve opening jars.met  3 Pt will improve his Quick Dash to less than 50%.Progressing    LTG 12 weeks    1 Pt will improve his left elbow ROM to at least 5-100 degrees.met  2 Pt will improve his left elbow gross strength to 4/5.progressing  3 Pt will improve his  strength to 70#.met  4 Pt will report pain no greater than 5/10 with ADLs.not met  5 Pt will improve his Quick Dash to less than 30%.not met    Plan  Therapy options: will be seen for skilled therapy services  Planned modality interventions: TENS, thermotherapy (hydrocollator packs), ultrasound, thermotherapy (paraffin bath) and cryotherapy  Planned therapy interventions: ADL retraining, flexibility, functional ROM exercises, home exercise program, IADL retraining, joint mobilization, manual therapy, neuromuscular re-education, postural training, soft tissue mobilization, strengthening, stretching, therapeutic activities and motor coordination training  Frequency: 2x week  Duration in weeks: 8  Treatment plan discussed with: patient  Plan details: Will follow for optimal gains.    Moderate Evaluation  72201    Re-evaluation   70588      Therapeutic exercise  03386  Therapeutic activity    38536  Neuromuscular re-education   24315  Manual therapy   83667    Unattended e-stim (Medicaid/Medicare)     Moist heat/cryotherapy 82279   Ultrasound   93849               Recommendations: Continue as planned  Timeframe: 2 months  Prognosis to achieve goals: good      Timed:         Manual Therapy:    12     mins  97308;     Therapeutic Exercise:    29     mins  36075;     Neuromuscular Jazmin:        mins  36725;    Therapeutic Activity:           mins  10836;     Gait Training:           mins  35315;     Ultrasound:          mins  32613;    Ionto                                   mins   39252  Self Care                            mins   90681  Canalith Repos         mins 25758      Un-Timed:  Electrical Stimulation:         mins  79707 ( );  Dry Needling          mins self-pay  Traction          mins 51589  Re-Eval                               mins  02078      Timed Treatment:   41   mins   Total Treatment:     47   Mins(6 min mh )          PT: Win Rodriguez PT     [place] License:  [id number]    Electronically signed by Win Rodriguez PT, 11/18/22, 1:06 PM EST    Certification Period: 11/18/2022 thru 2/15/2023  I certify that the therapy services are furnished while this patient is under my care.  The services outlined above are required by this patient, and will be reviewed every 90 days.         Physician Signature:__________________________________________________    PHYSICIAN: Dubin, Ronald S, MD  NPI: [id number]                                      DATE:  :     Please sign and return via fax to [email or web address] . Thank you, [facility]

## 2022-11-21 ENCOUNTER — TELEPHONE (OUTPATIENT)
Dept: UROLOGY | Facility: CLINIC | Age: 52
End: 2022-11-21

## 2022-11-21 NOTE — TELEPHONE ENCOUNTER
I CALLED PT WITH PSA RESULTS.          ----- Message from Griselda Cheng-Akwa, APRN sent at 11/19/2022  1:42 AM EST -----  PLEASE call patient with PSA results, follow up in clinic in 1 year as discussed,

## 2022-11-23 ENCOUNTER — TREATMENT (OUTPATIENT)
Dept: PHYSICAL THERAPY | Facility: CLINIC | Age: 52
End: 2022-11-23

## 2022-11-23 DIAGNOSIS — M25.622 DECREASED ROM OF LEFT ELBOW: ICD-10-CM

## 2022-11-23 DIAGNOSIS — M25.522 LEFT ELBOW PAIN: Primary | ICD-10-CM

## 2022-11-23 DIAGNOSIS — M77.12 LATERAL EPICONDYLITIS OF LEFT ELBOW: ICD-10-CM

## 2022-11-23 PROCEDURE — 97110 THERAPEUTIC EXERCISES: CPT | Performed by: PHYSICAL THERAPIST

## 2022-11-23 PROCEDURE — 97140 MANUAL THERAPY 1/> REGIONS: CPT | Performed by: PHYSICAL THERAPIST

## 2022-11-23 PROCEDURE — 97530 THERAPEUTIC ACTIVITIES: CPT | Performed by: PHYSICAL THERAPIST

## 2022-11-23 NOTE — PROGRESS NOTES
Physical Therapy Daily Treatment Note      Patient: Jaquan Mckay   : 1970  Referring practitioner: Ronald S Dubin, MD  Date of Initial Visit: Type: THERAPY  Noted: 2022  Today's Date: 2022  Patient seen for 18 sessions       Visit Diagnoses:    ICD-10-CM ICD-9-CM   1. Left elbow pain  M25.522 719.42   2. Decreased ROM of left elbow  M25.622 719.52   3. Lateral epicondylitis of left elbow  M77.12 726.32       Subjective Evaluation    History of Present Illness    Subjective comment: Pt reprots having 6/10 pain in the right elbow today.  Pt reports he will receive an MRI of the left ankle in the next two weeks.       Objective   See Exercise, Manual, and Modality Logs for complete treatment.       Assessment & Plan     Assessment    Assessment details: Tx today consisted of mh to wrist ext; followed by exercises for improved wrist and elbow mobility and strength and ended with STM to elbow for decreased edema and pain.  Pt reported 2/10 post pain.  Pt cont to demonstrate improvements with strength with noted cont pain with ADLs    Plan  Plan details: Will follow for improved elbow stability and decreased pain.          Timed:         Manual Therapy:    12     mins  42022;     Therapeutic Exercise:    17     mins  50302;     Neuromuscular Jazmin:        mins  39864;    Therapeutic Activity:     15     mins  32245;     Gait Training:           mins  84416;     Ultrasound:          mins  76604;    Ionto                                   mins   76313  Self Care                            mins   64937  Canalith Repos         mins 44533      Un-Timed:  Electrical Stimulation:         mins  20677 (MC );  Dry Needling          mins self-pay  Traction          mins 39707      Timed Treatment:   44   mins   Total Treatment:     52   Mins(5 min mh and 3 min ice)    Win Rodriguez PT  [place] License: [id number]      Electronically signed by Win Rodriguez PT, 22, 1:22 PM EST

## 2022-12-14 ENCOUNTER — OFFICE VISIT (OUTPATIENT)
Dept: FAMILY MEDICINE CLINIC | Facility: CLINIC | Age: 52
End: 2022-12-14

## 2022-12-14 VITALS
BODY MASS INDEX: 37.35 KG/M2 | DIASTOLIC BLOOD PRESSURE: 78 MMHG | WEIGHT: 238 LBS | OXYGEN SATURATION: 98 % | HEART RATE: 88 BPM | TEMPERATURE: 98.4 F | SYSTOLIC BLOOD PRESSURE: 124 MMHG | RESPIRATION RATE: 16 BRPM | HEIGHT: 67 IN

## 2022-12-14 DIAGNOSIS — E66.01 CLASS 2 SEVERE OBESITY DUE TO EXCESS CALORIES WITH SERIOUS COMORBIDITY AND BODY MASS INDEX (BMI) OF 36.0 TO 36.9 IN ADULT: ICD-10-CM

## 2022-12-14 DIAGNOSIS — J01.10 ACUTE NON-RECURRENT FRONTAL SINUSITIS: Primary | ICD-10-CM

## 2022-12-14 DIAGNOSIS — J45.30 MILD PERSISTENT ASTHMA WITHOUT COMPLICATION: ICD-10-CM

## 2022-12-14 DIAGNOSIS — I10 ESSENTIAL HYPERTENSION: ICD-10-CM

## 2022-12-14 DIAGNOSIS — J34.89 NASAL SORE: ICD-10-CM

## 2022-12-14 DIAGNOSIS — F51.04 PSYCHOPHYSIOLOGICAL INSOMNIA: ICD-10-CM

## 2022-12-14 PROCEDURE — 99214 OFFICE O/P EST MOD 30 MIN: CPT | Performed by: NURSE PRACTITIONER

## 2022-12-14 RX ORDER — DEXTROMETHORPHAN HYDROBROMIDE AND PROMETHAZINE HYDROCHLORIDE 15; 6.25 MG/5ML; MG/5ML
5 SYRUP ORAL 4 TIMES DAILY PRN
Qty: 180 ML | Refills: 0 | Status: SHIPPED | OUTPATIENT
Start: 2022-12-14 | End: 2023-01-04

## 2022-12-14 RX ORDER — AZITHROMYCIN 250 MG/1
TABLET, FILM COATED ORAL
Qty: 6 TABLET | Refills: 0 | Status: SHIPPED | OUTPATIENT
Start: 2022-12-14 | End: 2023-01-04

## 2022-12-14 NOTE — PROGRESS NOTES
Subjective   Jaquan Mckay is a 52 y.o. male.     Chief Complaint   Patient presents with   • Hypertension       History of Present Illness     HTN-chronic and ongoing.  He is currently on Lisinopril 30 mg.  No negative side effects.  No associated symptoms such as CP, SOA, HA, or dizziness.  URI symptoms-for about a week.  He reports persistent cough.  He reports chest irritation.  Cough until he is dizzy.  He reports a pounding headache.  Sore throat is present since today.  He reports that he has PND.  Sinus pressure is present.   He reports that he has some generalized fatigue.  No fever.   He has had some nausea but no vomiting.  Some nose bleeds this past month.  He is on Claritin 10 mg and singulair 10 mg. He is also under the care of the allergy specialist.    He has needed his Rescue inhaler more frequently this past week.   He has felt more winded with even small task.    Podiatry-had MRI of his left ankle and will see the provider back in the next few days.  He reports that his ankle feels weak and he is wearing his brace.  He had a fall on Friday but he was climbing on the bed and got tangled in the covers.    GERD-ongoing.  He is currently on Dexilant 60 mg and Pepcid 40 mg.  He reports that he has not felt good in his abdomen for several days.   He reports generalized lower abdomen pain.  He reports he is having a lot of watery diarrhea.  He has been under the care of GI for bowel care.   Insomnia-reports that he has not been sleeping well.  He has been having to prop up to go to sleep.  He is using his inhalers.  He has not discussed this with his asthma and allergy provider.  He continue immunotherapy.    The following portions of the patient's history were reviewed and updated as appropriate: CC, ROS, allergies, current medications, past family history, past medical history, past social history, past surgical history and problem list.      Review of Systems   Constitutional: Negative for activity  "change, appetite change, fatigue and fever.   HENT: Positive for sinus pressure. Negative for congestion, ear pain, facial swelling, nosebleeds, rhinorrhea, sore throat and trouble swallowing.    Eyes: Negative for blurred vision, double vision and redness.   Respiratory: Positive for cough. Negative for chest tightness, shortness of breath and wheezing.    Cardiovascular: Negative for chest pain, palpitations and leg swelling.   Gastrointestinal: Negative for abdominal pain, blood in stool, constipation and diarrhea.   Endocrine: Negative.    Genitourinary: Negative for dysuria, flank pain, frequency, hematuria and urgency.   Musculoskeletal: Negative.    Skin: Negative.    Allergic/Immunologic: Negative.    Neurological: Positive for dizziness and headache. Negative for weakness and light-headedness.   Hematological: Negative.    Psychiatric/Behavioral: Negative for self-injury, sleep disturbance and suicidal ideas. The patient is not nervous/anxious.    All other systems reviewed and are negative.      Objective     /78   Pulse 88   Temp 98.4 °F (36.9 °C)   Resp 16   Ht 170.2 cm (67.01\")   Wt 108 kg (238 lb)   SpO2 98%   BMI 37.27 kg/m²     Physical Exam  Vitals reviewed.   Constitutional:       General: He is not in acute distress.     Appearance: He is well-developed. He is obese. He is not diaphoretic.   HENT:      Head: Normocephalic and atraumatic.      Jaw: No tenderness.      Comments: Brief oral exam.  Patient is presently wearing a face covering/mask due to COVID-19 pandemic.     Right Ear: Hearing, ear canal and external ear normal. A middle ear effusion is present.      Left Ear: Hearing, ear canal and external ear normal. A middle ear effusion is present.      Nose: Nasal tenderness and congestion present.      Right Sinus: Maxillary sinus tenderness and frontal sinus tenderness present.      Left Sinus: Maxillary sinus tenderness and frontal sinus tenderness present.      Mouth/Throat: "      Pharynx: Posterior oropharyngeal erythema (moderate) present.   Eyes:      General: Lids are normal. No scleral icterus.     Extraocular Movements:      Right eye: Normal extraocular motion and no nystagmus.      Left eye: Normal extraocular motion and no nystagmus.      Conjunctiva/sclera: Conjunctivae normal.      Pupils: Pupils are equal, round, and reactive to light.   Neck:      Thyroid: No thyromegaly or thyroid tenderness.      Vascular: No carotid bruit or JVD.      Trachea: No tracheal tenderness.   Cardiovascular:      Rate and Rhythm: Normal rate and regular rhythm.      Pulses:           Dorsalis pedis pulses are 2+ on the right side and 2+ on the left side.        Posterior tibial pulses are 2+ on the right side and 2+ on the left side.      Heart sounds: Normal heart sounds, S1 normal and S2 normal. No murmur heard.  Pulmonary:      Effort: Pulmonary effort is normal. No accessory muscle usage, prolonged expiration or respiratory distress.      Breath sounds: Examination of the right-upper field reveals wheezing. Examination of the left-upper field reveals wheezing. Wheezing (scattered fine in upper lobes) present.   Chest:      Chest wall: No tenderness.   Abdominal:      General: Bowel sounds are normal. There is no distension.      Palpations: Abdomen is soft. There is no mass.      Tenderness: There is no abdominal tenderness.   Musculoskeletal:         General: Tenderness present.      Left elbow: Decreased range of motion. Tenderness present.      Cervical back: Normal range of motion and neck supple.      Thoracic back: Tenderness present.      Lumbar back: Tenderness present. Decreased range of motion.      Right lower leg: No edema.      Left lower leg: No edema.      Left foot: Decreased range of motion.      Comments: No muscular atrophy or flaccidity.   Lymphadenopathy:      Head:      Right side of head: No submental or submandibular adenopathy.      Left side of head: No submental or  submandibular adenopathy.      Cervical: Cervical adenopathy present.      Right cervical: Superficial cervical adenopathy present.      Left cervical: Superficial cervical adenopathy present.   Skin:     General: Skin is warm and dry.      Capillary Refill: Capillary refill takes less than 2 seconds.      Coloration: Skin is not jaundiced or pale.      Findings: No erythema.      Nails: There is no clubbing.   Neurological:      Mental Status: He is alert and oriented to person, place, and time.      Cranial Nerves: No cranial nerve deficit or facial asymmetry.      Sensory: No sensory deficit.      Motor: No weakness, tremor, atrophy or abnormal muscle tone.      Coordination: Coordination normal.      Gait: Gait abnormal (moderate antalgia).      Deep Tendon Reflexes: Reflexes are normal and symmetric.   Psychiatric:         Attention and Perception: He is attentive.         Mood and Affect: Mood is anxious and depressed. Affect is flat.         Speech: Speech normal.         Behavior: Behavior normal. Behavior is cooperative.         Thought Content: Thought content normal.         Cognition and Memory: Cognition normal.         Judgment: Judgment normal.           Diagnoses and all orders for this visit:    1. Acute non-recurrent frontal sinusitis (Primary)  -     azithromycin (Zithromax Z-Clark) 250 MG tablet; Take 2 tablets by mouth on day 1, then 1 tablet daily on days 2-5  Dispense: 6 tablet; Refill: 0  -     promethazine-dextromethorphan (PROMETHAZINE-DM) 6.25-15 MG/5ML syrup; Take 5 mL by mouth 4 (Four) Times a Day As Needed for Cough.  Dispense: 180 mL; Refill: 0    2. Nasal sore  Comments:  Bactroban ointment to area.  Report any nonhealing area or signs or symptoms of infection  Orders:  -     mupirocin (BACTROBAN) 2 % ointment; Apply  topically to the appropriate area as directed 3 (Three) Times a Day.  Dispense: 30 g; Refill: 2    3. Mild persistent asthma without complication  Assessment &  Plan:  Continue under the care of asthma and allergy specialist.  Continue inhalers as directed.  Continue Astelin nasal spray, Benadryl as needed, Flonase nasal spray Claritin 10 mg, and montelukast 10 mg      4. Psychophysiological insomnia  Overview:  With depression and anxiety      Assessment & Plan:  Continue restoril 15 mg.  Continue remeron   Report any increase in anxiety and insomnia.      5. Class 2 severe obesity due to excess calories with serious comorbidity and body mass index (BMI) of 36.0 to 36.9 in adult (HCC)  Assessment & Plan:  Continue to work on diet changes and be active as physically able.       Class 2 Severe Obesity (BMI >=35 and <=39.9). Obesity-related health conditions include the following: hypertension, dyslipidemias, GERD and osteoarthritis. Obesity is unchanged. BMI is is above average. We discussed portion control and increasing exercise.     Understands disease processes and need for medications.  Understands reasons for urgent and emergent care.  Patient (& family) verbalized agreement for treatment plan.   Emotional support and active listening provided.  Patient provided time to verbalize feelings.    [facility]/PMS[facility] reviewed today and consistent.  Will refill prescribed controlled medication today.  Patient is aware they cannot receive narcotics from any other provider except if under care of pain management or speciality clinic.  Risk and benefits of medication use has been reviewed.  History and physical exam exhibit continued safe and appropriate use of controlled substances.  The patient is aware of the potential for addiction and dependence.  This patient has been made aware of the appropriate use of such medications, including potential risk of somnolence, limited ability to drive and / or work safely, and potential for overdose.    It has also been made clear that these medications are for use by this patient only, without concomitant use of alcohol or other substances  unless prescribed/advised by medical provider.  Patient understands they may be subject to UDS and pill counts at random.    Patient considered to be low risk for addiction due to use of single controlled medications.  Patient understands and accepts these risks.  Patient need for medication will be reassessed at each visit.  Doses will be adjusted according to patient need and findings.    Goal of TX: Patient will not have any adverse reactions of medication.  Patient will have improvement in sleep hygiene/pattern with use of Restoril as needed as directed.    Medication Dispense Information    Temazepam   Dispensed: 2022 12:00 AM   Written:  2022   Unit strength: 30MG   Days supply: 30   Quantity: 30 each   Refills remainin   Pharmacy: [facility]   Authorizing provider: LENNOX ROE   Received from: [facility] (Fill History)   Brand or Generic:       RTC 2 months, sooner if needed.             This document has been electronically signed by:  BALDOMERO Thao FNP-C Dragon disclaimer:  Part of this note may be an electronic transcription/translation of spoken language to printed text using the Dragon Dictation System.

## 2022-12-27 NOTE — ASSESSMENT & PLAN NOTE
Continue under the care of asthma and allergy specialist.  Continue inhalers as directed.  Continue Astelin nasal spray, Benadryl as needed, Flonase nasal spray Claritin 10 mg, and montelukast 10 mg

## 2023-01-03 ENCOUNTER — APPOINTMENT (OUTPATIENT)
Dept: GENERAL RADIOLOGY | Facility: HOSPITAL | Age: 53
End: 2023-01-03
Payer: COMMERCIAL

## 2023-01-03 ENCOUNTER — APPOINTMENT (OUTPATIENT)
Dept: CT IMAGING | Facility: HOSPITAL | Age: 53
End: 2023-01-03
Payer: COMMERCIAL

## 2023-01-03 ENCOUNTER — HOSPITAL ENCOUNTER (EMERGENCY)
Facility: HOSPITAL | Age: 53
Discharge: HOME | End: 2023-01-03
Attending: STUDENT IN AN ORGANIZED HEALTH CARE EDUCATION/TRAINING PROGRAM | Admitting: STUDENT IN AN ORGANIZED HEALTH CARE EDUCATION/TRAINING PROGRAM
Payer: COMMERCIAL

## 2023-01-03 VITALS
HEIGHT: 67 IN | DIASTOLIC BLOOD PRESSURE: 118 MMHG | RESPIRATION RATE: 18 BRPM | SYSTOLIC BLOOD PRESSURE: 150 MMHG | TEMPERATURE: 98.7 F | BODY MASS INDEX: 36.88 KG/M2 | HEART RATE: 104 BPM | WEIGHT: 235 LBS | OXYGEN SATURATION: 99 %
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
## 2023-01-03 DIAGNOSIS — S93.409A SPRAIN OF ANKLE, UNSPECIFIED LATERALITY, UNSPECIFIED LIGAMENT, INITIAL ENCOUNTER: ICD-10-CM

## 2023-01-03 PROCEDURE — 73630 X-RAY EXAM OF FOOT: CPT | Performed by: RADIOLOGY

## 2023-01-03 PROCEDURE — 73590 X-RAY EXAM OF LOWER LEG: CPT

## 2023-01-03 PROCEDURE — 25010000002 KETOROLAC TROMETHAMINE PER 15 MG: Performed by: STUDENT IN AN ORGANIZED HEALTH CARE EDUCATION/TRAINING PROGRAM

## 2023-01-03 PROCEDURE — 73590 X-RAY EXAM OF LOWER LEG: CPT | Performed by: RADIOLOGY

## 2023-01-03 PROCEDURE — 70450 CT HEAD/BRAIN W/O DYE: CPT

## 2023-01-03 PROCEDURE — 99283 EMERGENCY DEPT VISIT LOW MDM: CPT

## 2023-01-03 PROCEDURE — 73610 X-RAY EXAM OF ANKLE: CPT

## 2023-01-03 PROCEDURE — 70450 CT HEAD/BRAIN W/O DYE: CPT | Performed by: RADIOLOGY

## 2023-01-03 PROCEDURE — 73610 X-RAY EXAM OF ANKLE: CPT | Performed by: RADIOLOGY

## 2023-01-03 PROCEDURE — 96372 THER/PROPH/DIAG INJ SC/IM: CPT

## 2023-01-03 PROCEDURE — 73630 X-RAY EXAM OF FOOT: CPT

## 2023-01-03 RX ORDER — ACETAMINOPHEN 500 MG
1000 TABLET ORAL ONCE
Status: COMPLETED | OUTPATIENT
Start: 2023-01-03 | End: 2023-01-03

## 2023-01-03 RX ORDER — METHOCARBAMOL 750 MG/1
750 TABLET, FILM COATED ORAL 3 TIMES DAILY PRN
Qty: 20 TABLET | Refills: 0 | Status: SHIPPED | OUTPATIENT
Start: 2023-01-03 | End: 2023-01-10

## 2023-01-03 RX ORDER — KETOROLAC TROMETHAMINE 30 MG/ML
30 INJECTION, SOLUTION INTRAMUSCULAR; INTRAVENOUS ONCE
Status: COMPLETED | OUTPATIENT
Start: 2023-01-03 | End: 2023-01-03

## 2023-01-03 RX ADMIN — KETOROLAC TROMETHAMINE 30 MG: 60 INJECTION, SOLUTION INTRAMUSCULAR at 17:03

## 2023-01-03 RX ADMIN — ACETAMINOPHEN 1000 MG: 500 TABLET ORAL at 17:03

## 2023-01-03 NOTE — DISCHARGE INSTRUCTIONS
Please follow up with your primary care physician for further management. Use walking boot and crutches as needed and rest, ice and elevation.

## 2023-01-03 NOTE — ED NOTES
MEDICAL SCREENING:    Reason for Visit: left ankle injury (known torn ligaments, recent MRI-has apt in [place] w/ ortho)    Patient initially seen in triage.  The patient was advised further evaluation and diagnostic testing will be needed, some of the treatment and testing will be initiated in the lobby in order to begin the process.  The patient will be returned to the waiting area for the time being and possibly be re-assessed by a subsequent ED provider.  The patient will be brought back to the treatment area in as timely manner as possible.       Placido Borges DO  01/03/23 1504

## 2023-01-04 ENCOUNTER — OFFICE VISIT (OUTPATIENT)
Dept: FAMILY MEDICINE CLINIC | Facility: CLINIC | Age: 53
End: 2023-01-04
Payer: COMMERCIAL

## 2023-01-04 ENCOUNTER — TRANSCRIBE ORDERS (OUTPATIENT)
Dept: PHYSICAL THERAPY | Facility: CLINIC | Age: 53
End: 2023-01-04
Payer: COMMERCIAL

## 2023-01-04 VITALS
BODY MASS INDEX: 37.67 KG/M2 | TEMPERATURE: 98.4 F | OXYGEN SATURATION: 98 % | HEART RATE: 74 BPM | RESPIRATION RATE: 14 BRPM | HEIGHT: 67 IN | DIASTOLIC BLOOD PRESSURE: 80 MMHG | SYSTOLIC BLOOD PRESSURE: 130 MMHG | WEIGHT: 240 LBS
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
## 2023-01-04 DIAGNOSIS — R06.01 ORTHOPNEA: ICD-10-CM

## 2023-01-04 DIAGNOSIS — F51.04 PSYCHOPHYSIOLOGICAL INSOMNIA: ICD-10-CM

## 2023-01-04 DIAGNOSIS — M76.72 PERONEAL TENDINITIS, LEFT: Primary | ICD-10-CM

## 2023-01-04 DIAGNOSIS — M79.672 LEFT FOOT PAIN: ICD-10-CM

## 2023-01-04 DIAGNOSIS — I10 ESSENTIAL HYPERTENSION: ICD-10-CM

## 2023-01-04 PROCEDURE — 99214 OFFICE O/P EST MOD 30 MIN: CPT | Performed by: NURSE PRACTITIONER

## 2023-01-04 PROCEDURE — 3075F SYST BP GE 130 - 139MM HG: CPT | Performed by: NURSE PRACTITIONER

## 2023-01-04 RX ORDER — HYDROCODONE BITARTRATE AND ACETAMINOPHEN 7.5; 325 MG/1; MG/1
1 TABLET ORAL EVERY 6 HOURS PRN
Qty: 12 TABLET | Refills: 0 | Status: SHIPPED | OUTPATIENT
Start: 2023-01-04

## 2023-01-04 RX ORDER — TEMAZEPAM 30 MG/1
30 CAPSULE ORAL NIGHTLY PRN
Qty: 30 CAPSULE | Refills: 2 | Status: SHIPPED | OUTPATIENT
Start: 2023-01-04

## 2023-01-04 NOTE — PROGRESS NOTES
Subjective   Jaquan Mckay is a 52 y.o. male.     Chief Complaint   Patient presents with   • Ankle Pain       History of Present Illness     Ankle pain-patient presents for ankle pain.  He is in a walking boot and using a cane.  He is under the care of Podiatry.    Fall-occurred yesterday.  Patient was seen at [facility].  He was diagnosed with a sprain of his ankle and given methocarbamol 750 mg 3 times daily as needed for symptoms.  He reports he was wearing his ankle brace when he fell and his ankle rotated outward.  Foot x-ray was negative for fracture.  Tib-fib x-ray of the left leg was also negative.   He reports this is the second fall in about 2 weeks.  The other fall occurred outside while the weather was icy.  He did hit his head yesterday on the couch arm.   He reports he is very sore today.    HTN-chronic and ongoing.  On Lisinopril 30 mg.  No negative side effects.  No associated symptoms such as CP, SOA, HA, or dizziness.  URI symptoms-present last month.  Has resolved.   Orthopnea-reports that he continues to be unable to lay flat.  He feels that he has PND and cough as well as inability to take a deep breath.   He uses his inhaler BID as directed.  He is under the care of asthma and allergy provider.  He questions if his inhaler is not helping as much as previous.  He has not had a recent appointment with the asthma provider.  No recent PFT.  He has had a cardiology work-up within the last year.      The following portions of the patient's history were reviewed and updated as appropriate: CC, ROS, allergies, current medications, past family history, past medical history, past social history, past surgical history and problem list.    Review of Systems   Constitutional: Negative for activity change, appetite change, fatigue and fever.   HENT: Negative for congestion, ear pain, facial swelling, nosebleeds, rhinorrhea, sinus pressure, sore throat and trouble swallowing.    Eyes: Negative for blurred vision,  double vision and redness.   Respiratory: Negative for cough, chest tightness, shortness of breath and wheezing.    Cardiovascular: Negative for chest pain, palpitations and leg swelling.   Gastrointestinal: Positive for abdominal pain. Negative for blood in stool, constipation and diarrhea.   Endocrine: Negative.    Genitourinary: Negative for dysuria, flank pain, frequency, hematuria and urgency.   Musculoskeletal: Negative.    Skin: Negative.    Allergic/Immunologic: Negative.    Neurological: Negative for dizziness, weakness, light-headedness and headache.   Hematological: Negative.    Psychiatric/Behavioral: Negative for self-injury, sleep disturbance and suicidal ideas. The patient is not nervous/anxious.    All other systems reviewed and are negative.      Objective     /80   Pulse 74   Temp 98.4 °F (36.9 °C)   Resp 14   Ht 170.2 cm (67.01\")   Wt 109 kg (240 lb)   SpO2 98%   BMI 37.58 kg/m²     Physical Exam  Vitals reviewed.   Constitutional:       General: He is not in acute distress.     Appearance: He is well-developed. He is obese. He is not diaphoretic.   HENT:      Head: Normocephalic and atraumatic.      Jaw: No tenderness.      Comments: Oropharynx not examined.  Patient is presently wearing a face covering/mask due to COVID-19 pandemic.     Right Ear: Hearing, tympanic membrane, ear canal and external ear normal.      Left Ear: Hearing, tympanic membrane, ear canal and external ear normal.   Eyes:      General: Lids are normal. No scleral icterus.     Extraocular Movements:      Right eye: Normal extraocular motion and no nystagmus.      Left eye: Normal extraocular motion and no nystagmus.      Conjunctiva/sclera: Conjunctivae normal.      Pupils: Pupils are equal, round, and reactive to light.   Neck:      Thyroid: No thyromegaly or thyroid tenderness.      Vascular: No carotid bruit or JVD.      Trachea: No tracheal tenderness.   Cardiovascular:      Rate and Rhythm: Normal rate and  regular rhythm.      Pulses:           Dorsalis pedis pulses are 2+ on the right side and 2+ on the left side.        Posterior tibial pulses are 2+ on the right side and 2+ on the left side.      Heart sounds: Normal heart sounds, S1 normal and S2 normal. No murmur heard.  Pulmonary:      Effort: Pulmonary effort is normal. No accessory muscle usage, prolonged expiration or respiratory distress.      Breath sounds: Normal breath sounds.   Chest:      Chest wall: No tenderness.   Abdominal:      General: Bowel sounds are normal. There is no distension.      Palpations: Abdomen is soft. There is no mass.      Tenderness: There is no abdominal tenderness.   Musculoskeletal:         General: Tenderness present.      Left elbow: Decreased range of motion. Tenderness present.      Cervical back: Normal range of motion and neck supple.      Thoracic back: Tenderness present.      Lumbar back: Tenderness present. Decreased range of motion.      Right lower leg: No edema.      Left lower leg: No edema.      Left foot: Decreased range of motion.      Comments: No muscular atrophy or flaccidity.  Using cane for mobility support.  Left foot in walking boot to mid calf  Multiple areas of generalized tenderness related to fall   Lymphadenopathy:      Head:      Right side of head: No submental or submandibular adenopathy.      Left side of head: No submental or submandibular adenopathy.      Cervical: No cervical adenopathy.      Right cervical: No superficial cervical adenopathy.     Left cervical: No superficial cervical adenopathy.   Skin:     General: Skin is warm and dry.      Capillary Refill: Capillary refill takes less than 2 seconds.      Coloration: Skin is not jaundiced or pale.      Findings: No erythema.      Nails: There is no clubbing.   Neurological:      Mental Status: He is alert and oriented to person, place, and time.      Cranial Nerves: No cranial nerve deficit or facial asymmetry.      Sensory: No sensory  deficit.      Motor: No weakness, tremor, atrophy or abnormal muscle tone.      Coordination: Coordination normal.      Gait: Gait abnormal (moderate antalgia).      Deep Tendon Reflexes: Reflexes are normal and symmetric.   Psychiatric:         Attention and Perception: He is attentive.         Mood and Affect: Mood is anxious and depressed. Affect is flat.         Speech: Speech normal.         Behavior: Behavior normal. Behavior is cooperative.         Thought Content: Thought content normal.         Judgment: Judgment normal.           Diagnoses and all orders for this visit:    1. Fall, subsequent encounter (Primary)  -     HYDROcodone-acetaminophen (Norco) 7.5-325 MG per tablet; Take 1 tablet by mouth Every 6 (Six) Hours As Needed for Moderate Pain.  Dispense: 12 tablet; Refill: 0    2. Left foot pain  -     HYDROcodone-acetaminophen (Norco) 7.5-325 MG per tablet; Take 1 tablet by mouth Every 6 (Six) Hours As Needed for Moderate Pain.  Dispense: 12 tablet; Refill: 0    3. Psychophysiological insomnia  Comments:  Continue mirtazapine 45 mg.  Increasing Restoril to 30 mg.  Patient to report any negative side effects.  Continue under the [facility] for mental health support  Overview:  With depression and anxiety      Orders:  -     temazepam (Restoril) 30 MG capsule; Take 1 capsule by mouth At Night As Needed for Sleep.  Dispense: 30 capsule; Refill: 2    4. Essential hypertension  Assessment & Plan:  Continue lisinopril 30 mg.  Continue under the care of cardiology.  Ambulatory BP monitoring either at home or random community checks.  Patient to report continued elevations >140/90.  Patient may come by office for checks if needed.       5. Orthopnea  Comments:  Advised to make an appointment with asthma provider for an updated PFT and inhaler evaluation.  If those are negative we will plan for cardiology work-up       Class 2 Severe Obesity (BMI >=35 and <=39.9). Obesity-related health conditions include the  following: hypertension, dyslipidemias, GERD and osteoarthritis. Obesity is unchanged. BMI is is above average. We discussed portion control and increasing exercise.       Understands disease processes and need for medications.  Understands reasons for urgent and emergent care.  Patient (& family) verbalized agreement for treatment plan.   Emotional support and active listening provided.  Patient provided time to verbalize feelings.    [facility]/PMDP reviewed today and consistent.  Will refill prescribed controlled medication today.  Patient is aware they cannot receive narcotics from any other provider except if under care of pain management or speciality clinic.  Risk and benefits of medication use has been reviewed.  History and physical exam exhibit continued safe and appropriate use of controlled substances.  The patient is aware of the potential for addiction and dependence.  This patient has been made aware of the appropriate use of such medications, including potential risk of somnolence, limited ability to drive and / or work safely, and potential for overdose.    It has also been made clear that these medications are for use by this patient only, without concomitant use of alcohol or other substances unless prescribed/advised by medical provider.  Patient understands they may be subject to UDS and pill counts at random.      Patient considered to be moderate risk for addiction due to use of multiple controlled medications.  Patient understands and accepts these risks.  Patient need for medication will be reassessed at each visit.  Doses will be adjusted according to patient need and findings.    Goal of TX: Patient will not have any adverse reactions of medication.  Patient have reduction in pain symptoms with use of PRN Norco as directed.  Patient will not have any adverse side effects from medication.  Patient will be able to remain active in an outside of home without interference from pain  symptoms.  Patient will have improvement in sleep hygiene/pattern with use of Restoril as needed as directed.    Medication Dispense Information    Temazepam   Dispensed: 2022 12:00 AM   Written:  2022   Unit strength: 30MG   Days supply: 30   Quantity: 30 each   Refills remainin   Pharmacy: [facility], [facility]   Authorizing provider: LENNOX ROE   Received from: [facility] (Fill History)   Brand or Generic:       Reminded not to take Norco and restoril simultaneously.      RTC 1 month, sooner if needed.             This document has been electronically signed by:  BALDOMERO Thao, ROSIEC    Dragon disclaimer:  Part of this note may be an electronic transcription/translation of spoken language to printed text using the Dragon Dictation System.

## 2023-01-05 ENCOUNTER — TREATMENT (OUTPATIENT)
Dept: PHYSICAL THERAPY | Facility: CLINIC | Age: 53
End: 2023-01-05
Payer: COMMERCIAL

## 2023-01-05 DIAGNOSIS — R26.89 ANTALGIC GAIT: ICD-10-CM

## 2023-01-05 DIAGNOSIS — M25.572 LEFT ANKLE PAIN, UNSPECIFIED CHRONICITY: Primary | ICD-10-CM

## 2023-01-05 DIAGNOSIS — S86.312A TEAR OF PERONEAL TENDON OF LEFT FOOT: ICD-10-CM

## 2023-01-05 PROCEDURE — 97162 PT EVAL MOD COMPLEX 30 MIN: CPT | Performed by: PHYSICAL THERAPIST

## 2023-01-05 NOTE — PROGRESS NOTES
Physical Therapy Initial Evaluation and Plan of Care    Patient: Jaquan Mckay   : 1970  Diagnosis/ICD-10 Code:  Left ankle pain, unspecified chronicity [M25.572]  Referring practitioner: Char Xiao DPM  Date of Initial Visit: 2023  Today's Date: 2023  Patient seen for 1 session         Visit Diagnoses:    ICD-10-CM ICD-9-CM   1. Left ankle pain, unspecified chronicity  M25.572 719.47   2. Tear of peroneal tendon of left foot  S86.312A 844.8   3. Antalgic gait  R26.89 781.2         Subjective Questionnaire: LEFS: 85%      Subjective Evaluation    History of Present Illness  Onset date: Summer of 2022.  Mechanism of injury: The patient sprained his left ankle while playing basketball around seven months ago.  The patient was diagnosed with a left ankle fracture. The patient injured his left ankle multiple times since the original injury.  He was referred to Char Xiao and received an MRI that demonstrated a left ankle peroneal tear.  The patient reports he has been scheduled for surgery and has been advised to receive therapy for strengthening.      Patient Occupation: disabled   Precautions and Work Restrictions: pt reports his boot is required with walkingQuality of life: good    Pain  Current pain ratin  At best pain ratin  At worst pain rating: 10  Location: left foot and ankle  Quality: sharp and knife-like  Relieving factors: medications, rest, support, ice and heat  Aggravating factors: standing, ambulation, squatting and repetitive movement  Progression: worsening    Hand dominance: right    Diagnostic Tests  MRI studies: abnormal    Patient Goals  Patient goals for therapy: decreased edema, decreased pain, improved balance, increased motion, increased strength, independence with ADLs/IADLs and return to sport/leisure activities             Objective          Observations     Additional Ankle/Foot Observation Details  No bruising; edema noted to the lateral left  ankle    Palpation     Additional Palpation Details  Left peroneal region; left achilles; left gastro; left arch and general left foot tenderness    Neurological Testing     Sensation     Ankle/Foot   Left Ankle/Foot   Diminished: light touch    Right Ankle/Foot   Intact: light touch     Active Range of Motion   Left Ankle/Foot   Plantar flexion: 45 degrees   Inversion: 15 degrees   Eversion: 0 degrees     Right Ankle/Foot   Dorsiflexion (ke): 5 degrees   Plantar flexion: 70 degrees   Inversion: 35 degrees   Eversion: 25 degrees     Additional Active Range of Motion Details  Left ankle lacks 20 degrees from neutral    Strength/Myotome Testing     Additional Strength Details  MMT deferred    Ambulation     Comments   Pt amb with cane with decreased stance on left and use of boot          Assessment & Plan     Assessment  Impairments: abnormal coordination, abnormal gait, abnormal muscle firing, abnormal or restricted ROM, activity intolerance, impaired balance, impaired physical strength, lacks appropriate home exercise program, pain with function and weight-bearing intolerance  Functional Limitations: walking, uncomfortable because of pain, standing and unable to perform repetitive tasks  Assessment details: Pt is a 53 y/o male referred to therapy for rehab following a left peroneal tear.  Pt presents with decreased left ankle ROM, decreased stability and impaired gait.  Therapy will follow for improved ankle stability and decreased pain in order to return to normal ADLs.  Prognosis: good    Goals  Plan Goals: STG 6 weeks    1 Pt will be instructed in a HEP.  2 Pt will improve his ankle ROM by 10 degrees for both DF and PF.  3 Pt will report pain no greater 6/10.    LTG 12 weeks    1 Pt will improve his LEFs to less than 30%.  2 Pt will demonstrate 4/5 gross left ankle stability.  3 Pt will report pain no greater than 3/10 with increased walking.  4 Pt will amb with with improed symmetry and  velocity.    Plan  Therapy options: will be seen for skilled therapy services  Planned modality interventions: cryotherapy, thermotherapy (hydrocollator packs) and ultrasound  Planned therapy interventions: ADL retraining, balance/weight-bearing training, flexibility, functional ROM exercises, gait training, home exercise program, IADL retraining, joint mobilization, manual therapy, motor coordination training, neuromuscular re-education, soft tissue mobilization, strengthening, stretching, transfer training and therapeutic activities  Frequency: 2x week  Duration in weeks: 6  Treatment plan discussed with: patient  Plan details: Will follow for optimal gains.  Moderate Evaluation  48212  Re-evaluation   69282  Therapeutic exercise  32829  Therapeutic activity    30469  Neuromuscular re-education   18229  Manual therapy   84630  Gait training  26793  Unattended e-stim (Medicaid/Medicare)     Moist heat/cryotherapy 34515   Ultrasound   85819              Timed:         Manual Therapy:         mins  34284;     Therapeutic Exercise:         mins  25376;     Neuromuscular Jazmin:        mins  34788;    Therapeutic Activity:          mins  84129;     Gait Training:           mins  87020;     Ultrasound:          mins  98187;    Ionto                                   mins   97512  Self Care                            mins   19500  Canalith Repos         mins 12618      Un-Timed:  Electrical Stimulation:         mins  74553 ( );  Dry Needling          mins self-pay  Traction          mins 96767  Low Eval          Mins  50586  Mod Eval     53     Mins  37989  High Eval                            Mins  13004        Timed Treatment:   53   mins   Total Treatment:     58   Mins(5 min mh)          PT: Win Rodriguez PT     License Number: [id number]  Electronically signed by Win Rodriguez PT, 01/05/23, 9:05 AM EST    Certification Period: 1/5/2023 thru 4/4/2023  I certify that the therapy services are  furnished while this patient is under my care.  The services outlined above are required by this patient, and will be reviewed every 90 days.         Physician Signature:__________________________________________________    PHYSICIAN: Char Xiao DPM  NPI: [id number]                                      DATE:      Please sign and return via fax to [email or web address] . Thank you, [facility].

## 2023-01-06 NOTE — ED PROVIDER NOTES
Subjective   History of Present Illness     Jaquan 51 yo male w/ multiple comorbidity presenting to the ED for (L) ankle injury, mechanical fall from standing days prior. Patient reports he recently had MR performed outpatient and has appointment with orthopaedics however due to worsening pain,  presented to the ED. Patient is ambulatory prior to arrival. No numbness, weakness. No discoloration of skin. Of Note patient reports he is not sure if he hit  His head while falling.     Review of Systems   Constitutional: Negative.  Negative for fever.   HENT: Negative.    Respiratory: Negative.    Cardiovascular: Negative.  Negative for chest pain.   Gastrointestinal: Negative.  Negative for abdominal pain.   Endocrine: Negative.    Genitourinary: Negative.  Negative for dysuria.   Musculoskeletal:        (L) ankle pain    Skin: Negative.    Neurological: Negative.    Psychiatric/Behavioral: Negative.    All other systems reviewed and are negative.      Past Medical History:   Diagnosis Date   • Allergic    • Anxiety    • Arthritis    • Asthma    • Body piercing     REPORTS CYLICONE IN EARS   • Clotting disorder (HCC) 2004    had a knee surgery   • Coronary artery disease    • Depression    • DVT (deep venous thrombosis) ([facility])     RIGHT RIGHT KNEE AFTER SURGERY YEARS AGO IN 2001 OR 2004   • Elevated cholesterol    • Gastric ulcer    • GERD (gastroesophageal reflux disease)    • H/O migraine    • Headache    • Heart attack ([facility])     REPORTS \"LIGHT HEART ATTACK A LONG TIME AGO\"  \"EARLY 90'S\"   • History of seizures     REPORTS LAST EPISODE WAS AROUND 1995.   • Hostility    • Hyperlipidemia    • Hypertension    • Knee pain, acute     Left   • Low back pain    • Lyme disease    • Migraine    • MRSA (methicillin resistant Staphylococcus aureus)     REPORTS LAST TESTED + 2004. WAS TREATED HE REPORTS.  RIGHT ARM, RIGHT KNEE.   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    • Obesity    • Poor historian    • [place] spotted fever    • Seizures  ([facility])    • Sleep apnea    • Tattoo    • Wears glasses        Allergies   Allergen Reactions   • Ciprofloxacin Anaphylaxis and Hives   • Miralax [Polyethylene Glycol] Itching and Rash   • Mobic [Meloxicam] Other (See Comments)     Pt states, \"It make my feet and hands go numb and I can't hardly walk.\"    • Paxil [Paroxetine Hcl] Shortness Of Breath     Chest pain    • Peanut-Containing Drug Products Anaphylaxis   • Penicillins Anaphylaxis   • Pristiq [Desvenlafaxine Succinate Er] Dizziness   • Sulfa Antibiotics Anaphylaxis, Itching and Rash   • Doxycycline Hives   • Fish-Derived Products Hives   • Isosorbide Nitrate Rash     Rash, hives, had to use inhaler.    • Movantik [Naloxegol] Rash   • Seroquel [Quetiapine] Hives and Rash   • Buspar [Buspirone] Rash   • Clarithromycin Rash   • Clindamycin/Lincomycin Rash   • Codeine Rash   • Contrast Dye Itching and Rash   • Diltiazem Rash   • Flomax [Tamsulosin] Hives   • Gabapentin Rash   • Keflex [Cephalexin] Rash   • Linzess [Linaclotide] Rash   • Metoprolol Rash   • Prednisone Rash and Other (See Comments)     Face, feet, and legs go completely numb per patient   • Robitussin Cough+ Chest Max St [Dextromethorphan-Guaifenesin] Itching   • Shrimp (Diagnostic) Rash   • Spironolactone Rash   • Viibryd [Vilazodone Hcl] Itching and Rash   • Zoloft [Sertraline Hcl] Hives and Itching       Past Surgical History:   Procedure Laterality Date   • ABDOMINAL SURGERY     • BACK SURGERY     • BRAIN SURGERY  1986    Tumor removal    • CARDIAC CATHETERIZATION N/A 9/28/2018    Procedure: Left Heart Cath;  Surgeon: Leandro Daily MD;  Location: [facility] CATH INVASIVE LOCATION;  Service: Cardiology   • CHOLECYSTECTOMY     • COLONOSCOPY     • COLONOSCOPY N/A 8/2/2021    Procedure: COLONOSCOPY FOR SCREENING;  Surgeon: Irving Azar MD;  Location: [facility] OR;  Service: Gastroenterology;  Laterality: N/A;   • CYST REMOVAL      pilonidal cyst   • ELBOW EPICONDYLECTOMY Right  7/23/2020    Procedure: LATERAL EPICONDYLAR RELEASE;  Surgeon: Jose Ruiz MD;  Location: [facility] OR;  Service: Orthopedics;  Laterality: Right;   • ENDOSCOPY     • ENDOSCOPY N/A 8/2/2021    Procedure: ESOPHAGOGASTRODUODENOSCOPY WITH BIOPSY;  Surgeon: Irving Azar MD;  Location: [facility] OR;  Service: Gastroenterology;  Laterality: N/A;  esophageal dilatation to 20mm   • FRACTURE SURGERY Right     elbow   • KNEE ARTHROSCOPY Left 10/20/2017    Procedure: Diagnostic arthroscopy left knee with chondroplasty;  Surgeon: Marco Aguirre MD;  Location: [facility] OR;  Service:    • KNEE ARTHROSCOPY Left 1/11/2021    Procedure: KNEE DIAGNOSTIC ARTHROSCOPY WITH  CHONDROPLASTY patella, femoral and medial;  Surgeon: Raul Eagle MD;  Location: [facility] OR;  Service: Orthopedics;  Laterality: Left;   • KNEE SURGERY Right    • MOUTH SURGERY      FULL MOUTH EXTRACTION   • OTHER SURGICAL HISTORY      REPORTS 7 TICKS REMOVED FROM RIGHT ARM IN 2001 OR 2002   • TENNIS ELBOW RELEASE Right 7/23/2020    Procedure: RIGHT TENNIS ELBOW RELEASE;  Surgeon: Jose Ruiz MD;  Location: [facility] OR;  Service: Orthopedics;  Laterality: Right;   • TUMOR EXCISION      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       Family History   Problem Relation Age of Onset   • Diabetes Mother    • Hypertension Mother    • Stroke Mother    • Diabetes Father    • Skin cancer Father    • Hypertension Father    • Heart attack Father    • Diabetes Brother    • Hypertension Brother    • Heart disease Maternal Aunt    • Heart disease Maternal Uncle    • Heart disease Paternal Aunt    • Heart disease Paternal Uncle    • Heart disease Maternal Grandmother    • Heart disease Maternal Grandfather    • Heart disease Paternal Grandmother    • Heart disease Paternal Grandfather        Social History     Socioeconomic History   • Marital status:      Spouse name: Becca   • Number of children: 2   • Years of education: 12    Tobacco Use   • Smoking status: Some Days     Packs/day: 0.25     Years: 17.00     Pack years: 4.25     Types: Cigars, Cigarettes     Start date: 4/11/2022   • Smokeless tobacco: Never   Vaping Use   • Vaping Use: Never used   Substance and Sexual Activity   • Alcohol use: No   • Drug use: No   • Sexual activity: Defer     Partners: Female     Birth control/protection: None           Objective   Physical Exam  Constitutional:       General: He is not in acute distress.     Appearance: Normal appearance. He is not ill-appearing.   HENT:      Head: Normocephalic and atraumatic.      Nose: No congestion or rhinorrhea.   Eyes:      Extraocular Movements: Extraocular movements intact.      Pupils: Pupils are equal, round, and reactive to light.   Cardiovascular:      Rate and Rhythm: Normal rate and regular rhythm.   Pulmonary:      Effort: Pulmonary effort is normal.      Breath sounds: Normal breath sounds.   Abdominal:      General: Bowel sounds are normal.      Palpations: Abdomen is soft.   Musculoskeletal:         General: Tenderness (L) ankle, no significant swelling, full ROM present. Normal range of motion.      Cervical back: Normal range of motion.   Skin:     General: Skin is warm.      Capillary Refill: Capillary refill takes less than 2 seconds.   Neurological:      General: No focal deficit present.      Mental Status: He is alert and oriented to person, place, and time.      Cranial Nerves: No cranial nerve deficit.      Sensory: No sensory deficit.      Motor: No weakness.      Deep Tendon Reflexes: Reflexes normal.         Procedures           ED Course                                           Medical Decision Making  Jaquan is a 53 yo presenting with ankle pain after mechanical fall from standing. Ambulatory prior to arrival. Full ROM. No significant swelling or discoloration of skin. CT head negative for acute hemorrhage. XR of extremtiy shows no acute fracture. Patient is discharged in stable  condition and informed to fu w/ pcp in 2-3 d and to keep appointment with orthopaedics. Patient already had MR outpatient informed to discuss w/ his orthopaedic surgeon. Discharged in stable condition with walking boot and crutches.     Fall, initial encounter: complicated acute illness or injury  Sprain of ankle, unspecified laterality, unspecified ligament, initial encounter: complicated acute illness or injury  Amount and/or Complexity of Data Reviewed  Radiology: ordered.      Risk  OTC drugs.  Prescription drug management.          Final diagnoses:   Fall, initial encounter   Sprain of ankle, unspecified laterality, unspecified ligament, initial encounter       ED Disposition  ED Disposition     ED Disposition   Discharge    Condition   Stable    Comment   Pt ambulated out of ed with crutches. Handouts given to take home.             Tiera Valenzuela, APRN  [address] [facility] [address] [phone]    Go in 2 days           Medication List      New Prescriptions    methocarbamol 750 MG tablet  Commonly known as: ROBAXIN  Take 1 tablet by mouth 3 (Three) Times a Day As Needed for Muscle Spasms for up to 7 days.           Where to Get Your Medications      These medications were sent to [facility] - [place], [place] - [address] - [phone]  - [phone] FX  [address] [facility] [address] [address]    Phone: [phone]   · methocarbamol 750 MG tablet          Lesvia Messina MD  01/06/23 1182

## 2023-01-11 ENCOUNTER — TREATMENT (OUTPATIENT)
Dept: PHYSICAL THERAPY | Facility: CLINIC | Age: 53
End: 2023-01-11
Payer: COMMERCIAL

## 2023-01-11 DIAGNOSIS — M25.572 LEFT ANKLE PAIN, UNSPECIFIED CHRONICITY: Primary | ICD-10-CM

## 2023-01-11 DIAGNOSIS — R26.89 ANTALGIC GAIT: ICD-10-CM

## 2023-01-11 DIAGNOSIS — S86.312A TEAR OF PERONEAL TENDON OF LEFT FOOT: ICD-10-CM

## 2023-01-11 PROCEDURE — 97110 THERAPEUTIC EXERCISES: CPT | Performed by: PHYSICAL THERAPIST

## 2023-01-11 NOTE — PROGRESS NOTES
Physical Therapy Daily Treatment Note      Patient: Jaquan Mckay   : 1970  Referring practitioner: Char Xiao DPM  Date of Initial Visit: Type: THERAPY  Noted: 2023  Today's Date: 2023  Patient seen for 2 sessions       Visit Diagnoses:    ICD-10-CM ICD-9-CM   1. Left ankle pain, unspecified chronicity  M25.572 719.47   2. Tear of peroneal tendon of left foot  S86.312A 844.8   3. Antalgic gait  R26.89 781.2       Subjective: Patient arrives to therapy w/ reports of 9/10 left ankle pain. Pt states his ankle popped this morning as he was walking to the bathroom using his cane. Pt notes he did not have his boot on at the time, but has had no bruising or increased swelling since pop.     Objective   See Exercise, Manual, and Modality Logs for complete treatment.       Assessment/Plan: Supervising therapist, Win Rodriguez, PT notified and aware of pt's subjective reports. PT assessed pt's ankle and gave clearance for patient to continue w/ treatment. PT educated patient to perform activities to his tolerance; pt verbalized understanding. Treatment performed including therex as listed to assist with improved ankle mobility, improved intrinsic strength, and LE strength, as tolerated f/b pulsed US and MH following. Pt provided with cues and demonstration as needed to maintain form, and for max benefit. Pt continues to benefit from therapy services, and will be progressed as tolerated to address goals, reduce pain and improve strength. No signs of distress or adverse reactions observed during, and/ or following tx. Continue with PT's POC.        Timed:         Manual Therapy:         mins  81464;     Therapeutic Exercise:    39     mins  80194;     Neuromuscular Jazmin:        mins  97425;    Therapeutic Activity:          mins  99732;     Gait Training:           mins  41035;     Ultrasound:    8      mins  76975;    Ionto                                   mins   68020  Self Care                             mins   78501  Canalith Repos         mins 61076      Un-Timed:  Electrical Stimulation:         mins  10419 ( );  Dry Needling          mins self-pay  Traction          mins 11467      Timed Treatment:  47    mins   Total Treatment:    53    mins (CP: x 6 min)     Leighann Brown. NEIL [place]  [place] License: [id number]

## 2023-01-13 ENCOUNTER — TREATMENT (OUTPATIENT)
Dept: PHYSICAL THERAPY | Facility: CLINIC | Age: 53
End: 2023-01-13
Payer: COMMERCIAL

## 2023-01-13 DIAGNOSIS — M25.572 LEFT ANKLE PAIN, UNSPECIFIED CHRONICITY: Primary | ICD-10-CM

## 2023-01-13 DIAGNOSIS — S86.312A TEAR OF PERONEAL TENDON OF LEFT FOOT: ICD-10-CM

## 2023-01-13 DIAGNOSIS — R26.89 ANTALGIC GAIT: ICD-10-CM

## 2023-01-13 PROCEDURE — 97110 THERAPEUTIC EXERCISES: CPT | Performed by: PHYSICAL THERAPIST

## 2023-01-13 NOTE — PROGRESS NOTES
Physical Therapy Daily Treatment Note      Patient: Jaquan Mckay   : 1970  Referring practitioner: Char Xiao DPM  Date of Initial Visit: Type: THERAPY  Noted: 2023  Today's Date: 2023  Patient seen for 3 sessions       Visit Diagnoses:    ICD-10-CM ICD-9-CM   1. Left ankle pain, unspecified chronicity  M25.572 719.47   2. Tear of peroneal tendon of left foot  S86.312A 844.8   3. Antalgic gait  R26.89 781.2       Subjective: Patient reports 8/10 left foot/ ankle pain prior to tx. Pt states he feels his pain could be increased slightly due to the cold, rainy weather. Pt notes he tolerated last session well w/ good tolerance.    Objective   See Exercise, Manual, and Modality Logs for complete treatment.       Assessment/Plan: Patient responded well to today's session with reports of slight decrease in pain following, 6/10. Treatment performed including therex as per flow sheet f/b conclusion of pulsed US. Exercise progressed with strengthening reps increased as tolerated, and additional intrinsic strengthening activities added. Pt noted with some fatigue, however tolerable. Pt denied cryotherapy at conclusion. Pt continues to benefit form therapy services, and will be progressed as tolerated to address goals, reduce pain, and improve strength. No signs of distress or adverse reactions observed during, and/ or following tx. Continue with PT's POC.       Timed:         Manual Therapy:         mins  26017;     Therapeutic Exercise:    42     mins  16942;     Neuromuscular Jazmin:        mins  48379;    Therapeutic Activity:          mins  00793;     Gait Training:           mins  31452;     Ultrasound:    8      mins  25287;    Ionto                                   mins   85063  Self Care                            mins   93729  Canalith Repos         mins 51878      Un-Timed:  Electrical Stimulation:         mins  79283 ( );  Dry Needling          mins self-pay  Traction          mins  36074      Timed Treatment:   50   mins   Total Treatment:    50    mins    Leighann Brown. NEIL [place]  [place] License: [id number]

## 2023-01-18 ENCOUNTER — TREATMENT (OUTPATIENT)
Dept: PHYSICAL THERAPY | Facility: CLINIC | Age: 53
End: 2023-01-18
Payer: COMMERCIAL

## 2023-01-18 DIAGNOSIS — M25.572 LEFT ANKLE PAIN, UNSPECIFIED CHRONICITY: Primary | ICD-10-CM

## 2023-01-18 DIAGNOSIS — R26.89 ANTALGIC GAIT: ICD-10-CM

## 2023-01-18 DIAGNOSIS — S86.312A TEAR OF PERONEAL TENDON OF LEFT FOOT: ICD-10-CM

## 2023-01-18 PROCEDURE — 97110 THERAPEUTIC EXERCISES: CPT | Performed by: PHYSICAL THERAPIST

## 2023-01-18 PROCEDURE — 97035 APP MDLTY 1+ULTRASOUND EA 15: CPT | Performed by: PHYSICAL THERAPIST

## 2023-01-18 NOTE — PROGRESS NOTES
Physical Therapy Daily Treatment Note      Patient: Jaquan Mckay   : 1970  Referring practitioner: Char Xiao DPM  Date of Initial Visit: Type: THERAPY  Noted: 2023  Today's Date: 2023  Patient seen for 4 sessions       Visit Diagnoses:    ICD-10-CM ICD-9-CM   1. Left ankle pain, unspecified chronicity  M25.572 719.47   2. Tear of peroneal tendon of left foot  S86.312A 844.8   3. Antalgic gait  R26.89 781.2       Subjective Evaluation    History of Present Illness    Subjective comment: Pt reprots having 8/10 pain today.       Objective   See Exercise, Manual, and Modality Logs for complete treatment.       Assessment & Plan     Assessment    Assessment details: Tx today consisted of exercises for improved ankle mobility and stability; followed by hip and knee exercises and ended with US to ankle for decreased pain and inflammation.  Pt demonstrated good effort today with reports of decreased pain to 5/10.  Pt responded well to added reps.    Plan  Plan details: Will follow progressing ankle stability. May add balance activities next session.          Timed:         Manual Therapy:         mins  66430;     Therapeutic Exercise:    32     mins  88398;     Neuromuscular Jazmin:        mins  42845;    Therapeutic Activity:          mins  87131;     Gait Training:           mins  33946;     Ultrasound:     8     mins  55600;    Ionto                                   mins   68922  Self Care                            mins   02878  Canalith Repos         mins 33530      Un-Timed:  Electrical Stimulation:         mins  04951 ( );  Dry Needling          mins self-pay  Traction          mins 74579      Timed Treatment:   40   mins   Total Treatment:     40   mins    Win Rodriguez PT  [place] License: [id number]      Electronically signed by Win Rodriguez PT, 23, 1:05 PM EST

## 2023-01-20 ENCOUNTER — TREATMENT (OUTPATIENT)
Dept: PHYSICAL THERAPY | Facility: CLINIC | Age: 53
End: 2023-01-20
Payer: COMMERCIAL

## 2023-01-20 DIAGNOSIS — R26.89 ANTALGIC GAIT: ICD-10-CM

## 2023-01-20 DIAGNOSIS — S86.312A TEAR OF PERONEAL TENDON OF LEFT FOOT: ICD-10-CM

## 2023-01-20 DIAGNOSIS — M25.572 LEFT ANKLE PAIN, UNSPECIFIED CHRONICITY: Primary | ICD-10-CM

## 2023-01-20 PROCEDURE — 97112 NEUROMUSCULAR REEDUCATION: CPT | Performed by: PHYSICAL THERAPIST

## 2023-01-20 PROCEDURE — 97035 APP MDLTY 1+ULTRASOUND EA 15: CPT | Performed by: PHYSICAL THERAPIST

## 2023-01-20 PROCEDURE — 97110 THERAPEUTIC EXERCISES: CPT | Performed by: PHYSICAL THERAPIST

## 2023-01-20 NOTE — PROGRESS NOTES
Physical Therapy Daily Treatment Note      Patient: Jaquan Mckay   : 1970  Referring practitioner: Char Xiao DPM  Date of Initial Visit: Type: THERAPY  Noted: 2023  Today's Date: 2023  Patient seen for 5 sessions       Visit Diagnoses:    ICD-10-CM ICD-9-CM   1. Left ankle pain, unspecified chronicity  M25.572 719.47   2. Tear of peroneal tendon of left foot  S86.312A 844.8   3. Antalgic gait  R26.89 781.2       Subjective Evaluation    History of Present Illness    Subjective comment: Pt reports having 9/10 pain today.       Objective   See Exercise, Manual, and Modality Logs for complete treatment.       Assessment & Plan     Assessment    Assessment details: Tx today consisted of mh to ankle; followed by exercises for ankle knee and hip stability in supine and sitting; standing proprioceptive training for improved balance and wt shifting and ended with US to ankle for decreased edema.  Pt demonstrate good effort with mild pain with side stepping and foam standing.  Pt did report decreased pain to 7/10 post tx.    Plan  Plan details: Will follow progressing ankle stability and mobility and improved function.          Timed:         Manual Therapy:         mins  24599;     Therapeutic Exercise:    17     mins  36753;     Neuromuscular Jazmin:    14    mins  87872;    Therapeutic Activity:          mins  41076;     Gait Training:           mins  42915;     Ultrasound:     8     mins  75282;    Ionto                                   mins   40223  Self Care                            mins   91441  Canalith Repos         mins 77544      Un-Timed:  Electrical Stimulation:         mins  71847 (MC );  Dry Needling          mins self-pay  Traction          mins 98924      Timed Treatment:   39   mins   Total Treatment:     45   Mins(6 min mh)    Win Rodriguez PT  [place] License: [id number]      Electronically signed by Win Rodriguez PT, 23, 1:11 PM EST

## 2023-01-25 ENCOUNTER — TREATMENT (OUTPATIENT)
Dept: PHYSICAL THERAPY | Facility: CLINIC | Age: 53
End: 2023-01-25
Payer: COMMERCIAL

## 2023-01-25 DIAGNOSIS — S86.312A TEAR OF PERONEAL TENDON OF LEFT FOOT: ICD-10-CM

## 2023-01-25 DIAGNOSIS — R26.89 ANTALGIC GAIT: ICD-10-CM

## 2023-01-25 DIAGNOSIS — M25.572 LEFT ANKLE PAIN, UNSPECIFIED CHRONICITY: Primary | ICD-10-CM

## 2023-01-25 PROCEDURE — 97110 THERAPEUTIC EXERCISES: CPT | Performed by: PHYSICAL THERAPIST

## 2023-01-25 PROCEDURE — 97035 APP MDLTY 1+ULTRASOUND EA 15: CPT | Performed by: PHYSICAL THERAPIST

## 2023-01-25 PROCEDURE — 97530 THERAPEUTIC ACTIVITIES: CPT | Performed by: PHYSICAL THERAPIST

## 2023-01-25 NOTE — PROGRESS NOTES
Physical Therapy Daily Treatment Note      Patient: Jaquan Mckay   : 1970  Referring practitioner: Char Xiao DPM  Date of Initial Visit: Type: THERAPY  Noted: 2023  Today's Date: 2023  Patient seen for 6 sessions       Visit Diagnoses:    ICD-10-CM ICD-9-CM   1. Left ankle pain, unspecified chronicity  M25.572 719.47   2. Tear of peroneal tendon of left foot  S86.312A 844.8   3. Antalgic gait  R26.89 781.2       Subjective: Patient reports 9/10 left foot/ ankle pain upon arrival to therapy. Pt states of no change in activity. Pt is scheduled to f/u with referring provider on 23.      Objective   See Exercise, Manual, and Modality Logs for complete treatment.       Assessment/Plan: Patient completed today's session with reports of slight decrease in pain following, -10. Treatment consisted of therex and therapeutic activities as listed with continued focus on improved foot/intrinsic strength, improved LE strength, and to assist difficulty performing ADL's including stair climbing. Pt educated to perform activities to his tolerance, secondary to elevated pain scores upon arrival; pt verbalized understanding. Pulsed US and application of cryotherapy performed at conclusion. Pt limited with overall progression of exercise and activity on this date due to elevated pain. Pt continues to benefit from therapy services and will be progressed as tolerated to address goals, reduce pain and improve function. No signs of distress or adverse reactions observed during, and/ or following tx. Continue with PT's POC.       Timed:         Manual Therapy:         mins  19310;     Therapeutic Exercise:   36      mins  27957;     Neuromuscular Jazmin:        mins  37067;    Therapeutic Activity:     10     mins  61275;     Gait Training:           mins  11154;     Ultrasound:     8     mins  26077;    Ionto                                   mins   70225  Self Care                            mins    75752  [facility]         mins 10617      Un-Timed:  Electrical Stimulation:         mins  79259 ( );  Dry Needling          mins self-pay  Traction          mins 57392      Timed Treatment:  54    mins   Total Treatment:    56    mins (CP: x 2 min)      Leighann Brown. Tonny, PTA  [place] License: [id number]

## 2023-01-27 ENCOUNTER — TREATMENT (OUTPATIENT)
Dept: PHYSICAL THERAPY | Facility: CLINIC | Age: 53
End: 2023-01-27
Payer: COMMERCIAL

## 2023-01-27 DIAGNOSIS — R26.89 ANTALGIC GAIT: ICD-10-CM

## 2023-01-27 DIAGNOSIS — M25.572 LEFT ANKLE PAIN, UNSPECIFIED CHRONICITY: Primary | ICD-10-CM

## 2023-01-27 DIAGNOSIS — S86.312A TEAR OF PERONEAL TENDON OF LEFT FOOT: ICD-10-CM

## 2023-01-27 PROCEDURE — 97110 THERAPEUTIC EXERCISES: CPT | Performed by: PHYSICAL THERAPIST

## 2023-01-27 PROCEDURE — 97530 THERAPEUTIC ACTIVITIES: CPT | Performed by: PHYSICAL THERAPIST

## 2023-01-27 NOTE — PROGRESS NOTES
Physical Therapy Daily Treatment Note      Patient: Jaquan Mckay   : 1970  Referring practitioner: Char Xiao DPM  Date of Initial Visit: Type: THERAPY  Noted: 2023  Today's Date: 2023  Patient seen for 7 sessions       Visit Diagnoses:    ICD-10-CM ICD-9-CM   1. Left ankle pain, unspecified chronicity  M25.572 719.47   2. Tear of peroneal tendon of left foot  S86.312A 844.8   3. Antalgic gait  R26.89 781.2       Subjective: Patient reports 8/10 left foot/ ankle pain upon arrival to therapy. Pt states he had f/u appointment with referring MD this morning, and received recommendation to continue with therapy. Pt states they are trying to get him an earlier appointment to see an orthopedic in [place].      Objective   See Exercise, Manual, and Modality Logs for complete treatment.       Assessment/Plan: Patient tolerated treatment fairly well today with reports of slight decrease in pain following, 6/10. Session consisted of therex and therapeutic activities as listed with continued focus on improved LE strength, improved foot intrinsic strength, and to ease difficulty with performing ADL's like stair climbing. Pulsed US completed at conclusion. Exercise progressed with sub-max ankle isometrics initiated. Pt observed with good tolerance and was provided with cues for proper form w/ new added activities. Pt limited with overall progression of exercise secondary to elevated pain scores. Pt continues to benefit from therapy services, and will be progressed as tolerated to address goals, improve strength and mobility. No signs of distress or adverse reactions observed during, and/ or following tx. Continue with PT's POC.       Timed:         Manual Therapy:         mins  56437;     Therapeutic Exercise:   30      mins  76264;     Neuromuscular Jazmin:        mins  62395;    Therapeutic Activity:    10      mins  89134;     Gait Training:           mins  73095;     Ultrasound:     8     mins  55846;     Ionto                                   mins   42615  Self Care                            mins   51251  Canalith Repos         mins 10342      Un-Timed:  Electrical Stimulation:         mins  58886 ( );  Dry Needling          mins self-pay  Traction          mins 07265      Timed Treatment:   48   mins   Total Treatment:    48    mins    Leighann Brown. NEIL [place]  [place] License: [id number]

## 2023-02-01 ENCOUNTER — TREATMENT (OUTPATIENT)
Dept: PHYSICAL THERAPY | Facility: CLINIC | Age: 53
End: 2023-02-01
Payer: COMMERCIAL

## 2023-02-01 DIAGNOSIS — M25.572 LEFT ANKLE PAIN, UNSPECIFIED CHRONICITY: Primary | ICD-10-CM

## 2023-02-01 DIAGNOSIS — S86.312A TEAR OF PERONEAL TENDON OF LEFT FOOT: ICD-10-CM

## 2023-02-01 DIAGNOSIS — R26.89 ANTALGIC GAIT: ICD-10-CM

## 2023-02-01 PROCEDURE — 97035 APP MDLTY 1+ULTRASOUND EA 15: CPT | Performed by: PHYSICAL THERAPIST

## 2023-02-01 PROCEDURE — 97110 THERAPEUTIC EXERCISES: CPT | Performed by: PHYSICAL THERAPIST

## 2023-02-01 NOTE — PROGRESS NOTES
Physical Therapy Daily Treatment Note      Patient: Jaquan Mckay   : 1970  Referring practitioner: Char Xiao DPM  Date of Initial Visit: Type: THERAPY  Noted: 2023  Today's Date: 2023  Patient seen for 8 sessions       Visit Diagnoses:    ICD-10-CM ICD-9-CM   1. Left ankle pain, unspecified chronicity  M25.572 719.47   2. Tear of peroneal tendon of left foot  S86.312A 844.8   3. Antalgic gait  R26.89 781.2       Subjective: Patient reports 9/10 left foot/ ankle pain upon arrival to therapy. Pt states he was seen by an orthopedic in [place] yesterday, Dr. Florez. Patient states Dr. Florez advised him to continue with therapy, and MD is going to order a nerve conduction study.    Objective   See Exercise, Manual, and Modality Logs for complete treatment.       Assessment/Plan: Patient completed today's session with reports of slight decrease in pain following, 7/10. Pt performed therex as listed with continued focus on improved ankle/ LE strength and improved range of motion f/b pulsed US at conclusion; pt denied cryotherapy. Pt limited with overall progression of exercise due to continued increased pain ratings. Pt utilized walking boot, and Rolator today. Pt will be progressed with therapy as tolerated. No signs of distress or adverse reactions observed during, and/ or following tx. Continue with PT's POC.       Timed:         Manual Therapy:         mins  49710;     Therapeutic Exercise:    34     mins  52305;     Neuromuscular Jazmin:        mins  34842;    Therapeutic Activity:          mins  71704;     Gait Training:           mins  24532;     Ultrasound:     8     mins  82646;    Ionto                                   mins   30212  Self Care                            mins   85484  Canalith Repos         mins 79014      Un-Timed:  Electrical Stimulation:         mins  15402 ([facility] );  Dry Needling          mins self-pay  Traction          mins 97603      Timed Treatment:  42    mins   Total  Treatment:    42    mins    Leighann Brown. [place], PTA  [place] License: [id number]

## 2023-02-03 ENCOUNTER — TREATMENT (OUTPATIENT)
Dept: PHYSICAL THERAPY | Facility: CLINIC | Age: 53
End: 2023-02-03
Payer: COMMERCIAL

## 2023-02-03 DIAGNOSIS — M25.572 LEFT ANKLE PAIN, UNSPECIFIED CHRONICITY: Primary | ICD-10-CM

## 2023-02-03 DIAGNOSIS — R26.89 ANTALGIC GAIT: ICD-10-CM

## 2023-02-03 DIAGNOSIS — S86.312A TEAR OF PERONEAL TENDON OF LEFT FOOT: ICD-10-CM

## 2023-02-03 PROCEDURE — 97035 APP MDLTY 1+ULTRASOUND EA 15: CPT | Performed by: PHYSICAL THERAPIST

## 2023-02-03 PROCEDURE — 97110 THERAPEUTIC EXERCISES: CPT | Performed by: PHYSICAL THERAPIST

## 2023-02-03 NOTE — PROGRESS NOTES
Physical Therapy Re Certification Of Plan of Care  Patient: Jaquan Mckay   : 1970  Diagnosis/ICD-10 Code:  Left ankle pain, unspecified chronicity [M25.572]  Referring practitioner: Char Xiao DPM  Date of Initial Visit: Type: THERAPY  Noted: 2023  Today's Date: 2/3/2023  Patient seen for 9 sessions         Visit Diagnoses:    ICD-10-CM ICD-9-CM   1. Left ankle pain, unspecified chronicity  M25.572 719.47   2. Tear of peroneal tendon of left foot  S86.312A 844.8   3. Antalgic gait  R26.89 781.2         Jaquan Mckay reports:   Subjective Questionnaire: LEFS: 84%  Clinical Progress: unchanged  Home Program Compliance: Yes  Treatment has included: therapeutic exercise, neuromuscular re-education, manual therapy, therapeutic activity, gait training, electrical stimulation, ultrasound, moist heat and cryotherapy      Subjective Evaluation    History of Present Illness    Subjective comment: Pt reports having short term relief in pain only.  Pain  Current pain ratin  At best pain ratin  At worst pain rating: 10  Location: left foot             Objective          Active Range of Motion   Left Ankle/Foot   Dorsiflexion (ke): 0 degrees   Plantar flexion: 55 degrees   Inversion: 20 degrees   Eversion: 10 degrees     Strength/Myotome Testing     Left Ankle/Foot   Dorsiflexion: 4-  Plantar flexion: 4-  Inversion: 4-  Eversion: 4-          Assessment & Plan     Assessment  Impairments: abnormal coordination, abnormal gait, abnormal muscle firing, abnormal or restricted ROM, activity intolerance, impaired balance, impaired physical strength, lacks appropriate home exercise program, pain with function and weight-bearing intolerance  Functional Limitations: walking, uncomfortable because of pain, standing and unable to perform repetitive tasks  Assessment details: Pt is a 51 y/o male referred to therapy for rehab following a left peroneal tear.  Pt has attended 9 sessions with good effort, yet no  significant change with balance, strength and function due to increased pain with minimal activities.  Pt did demonstrate improvements with ankle ROM.  Pt improved his LEFS by 1 point and is motivated to cont for max gains.  Prognosis: good    Goals  Plan Goals: STG 6 weeks    1 Pt will be instructed in a HEP.not met  2 Pt will improve his ankle ROM by 10 degrees for both DF and PF.met  3 Pt will report pain no greater 6/10. Not met    LTG 12 weeks    1 Pt will improve his LEFs to less than 30%.not met  2 Pt will demonstrate 4/5 gross left ankle stability.not met  3 Pt will report pain no greater than 3/10 with increased walking.not met  4 Pt will amb with with improved symmetry and velocity.not met    Plan  Therapy options: will be seen for skilled therapy services  Planned modality interventions: cryotherapy, thermotherapy (hydrocollator packs) and ultrasound  Planned therapy interventions: ADL retraining, balance/weight-bearing training, flexibility, functional ROM exercises, gait training, home exercise program, IADL retraining, joint mobilization, manual therapy, motor coordination training, neuromuscular re-education, soft tissue mobilization, strengthening, stretching, transfer training and therapeutic activities  Frequency: 2x week  Duration in weeks: 4  Treatment plan discussed with: patient  Plan details: Will follow for optimal gains.  Moderate Evaluation  28524  Re-evaluation   88885  Therapeutic exercise  48328  Therapeutic activity    13947  Neuromuscular re-education   66479  Manual therapy   92450  Gait training  18985  Unattended e-stim (Medicaid/Medicare)     Moist heat/cryotherapy 43883   Ultrasound   15599               Recommendations: Continue as planned  Timeframe: 1 month  Prognosis to achieve goals: fair      Timed:         Manual Therapy:         mins  90439;     Therapeutic Exercise:    36     mins  94168;     Neuromuscular Jazmin:        mins  46391;    Therapeutic Activity:           mins  02546;     Gait Training:           mins  27369;     Ultrasound:     8     mins  31702;    Ionto                                   mins   73216  Self Care                            mins   50894  Canalith Repos         mins 92983      Un-Timed:  Electrical Stimulation:         mins  24773 ( );  Dry Needling          mins self-pay  Traction          mins 24744  Re-Eval                               mins  26914      Timed Treatment:   44   mins   Total Treatment:     49   Mins(5 min ice)          PT: Win Rodriguez PT     [place] License:  [id number]    Electronically signed by Win Rodriguez PT, 02/03/23, 1:18 PM EST    Certification Period: 2/3/2023 thru 5/3/2023  I certify that the therapy services are furnished while this patient is under my care.  The services outlined above are required by this patient, and will be reviewed every 90 days.         Physician Signature:__________________________________________________    PHYSICIAN: Char Xiao DPM  NPI: [id number]                                      DATE:  :     Please sign and return via fax to [email or web address] . Thank you, [facility]

## 2023-02-08 ENCOUNTER — TREATMENT (OUTPATIENT)
Dept: PHYSICAL THERAPY | Facility: CLINIC | Age: 53
End: 2023-02-08
Payer: COMMERCIAL

## 2023-02-08 DIAGNOSIS — R26.89 ANTALGIC GAIT: ICD-10-CM

## 2023-02-08 DIAGNOSIS — M25.572 LEFT ANKLE PAIN, UNSPECIFIED CHRONICITY: Primary | ICD-10-CM

## 2023-02-08 DIAGNOSIS — S86.312A TEAR OF PERONEAL TENDON OF LEFT FOOT: ICD-10-CM

## 2023-02-08 PROCEDURE — 97110 THERAPEUTIC EXERCISES: CPT | Performed by: PHYSICAL THERAPIST

## 2023-02-08 PROCEDURE — 97035 APP MDLTY 1+ULTRASOUND EA 15: CPT | Performed by: PHYSICAL THERAPIST

## 2023-02-08 PROCEDURE — 97530 THERAPEUTIC ACTIVITIES: CPT | Performed by: PHYSICAL THERAPIST

## 2023-02-08 NOTE — PROGRESS NOTES
Physical Therapy Daily Treatment Note      Patient: Jaquan Mckay   : 1970  Referring practitioner: Char Xiao DPM  Date of Initial Visit: Type: THERAPY  Noted: 2023  Today's Date: 2023  Patient seen for 10 sessions       Visit Diagnoses:    ICD-10-CM ICD-9-CM   1. Left ankle pain, unspecified chronicity  M25.572 719.47   2. Tear of peroneal tendon of left foot  S86.312A 844.8   3. Antalgic gait  R26.89 781.2       Subjective: Patient reports 8/10 pain upon arrival to therapy. Pt states most of his pain is in the heel today. 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 Pt had his walking boot on at the time, and does not feel he injured his foot; only some bruising on the left anterior shin. Pt is scheduled to have a nerve conduction study on 23.    Objective   See Exercise, Manual, and Modality Logs for complete treatment.       Assessment/Plan: Supervising therapist, Hanna Burks, PT, DPT notified and aware of pt's subjective reports. PT assessed patient with bruising noted on left anterior shin, however patient demonstrated good ankle mobility and no increased pain with weight bearing. PT educated patient to continue to monitor symptoms and seek medical tx if needed; pt verbalized understanding. Pt also educated to perform activities to his tolerance today. Patient completed today's session with reports of slight decrease in pain following, 6/10. Pt received MH to left foot f/b therex, and therapeutic activities as listed and pulsed US at conclusion. Exercise progressed with resistance added to ankle PF/DF. Pt observed with good tolerance and was provided with cues as needed. Cryotherapy applied at conclusion. Pt will be progressed with therapy as tolerated. No signs of distress or adverse reactions observed during, and/ or following tx.     Timed:         Manual Therapy:         mins  90961;     Therapeutic Exercise:   30      mins   09650;     Neuromuscular Jazmin:        mins  00462;    Therapeutic Activity:    16      mins  47642;     Gait Training:           mins  78480;     Ultrasound:    8      mins  26700;    Ionto                                   mins   97429  Self Care                            mins   01327  Canalith Repos         mins 39001      Un-Timed:  Electrical Stimulation:         mins  92627 ( );  Dry Needling          mins self-pay  Traction          mins 99274      Timed Treatment:  54    mins   Total Treatment:    64    mins (CP: x5 min, MH: x 5 min)     Leighann Brown. [facility] [place] License: [id number]

## 2023-02-14 ENCOUNTER — OFFICE VISIT (OUTPATIENT)
Dept: FAMILY MEDICINE CLINIC | Facility: CLINIC | Age: 53
End: 2023-02-14
Payer: COMMERCIAL

## 2023-02-14 VITALS
SYSTOLIC BLOOD PRESSURE: 130 MMHG | TEMPERATURE: 98.4 F | HEIGHT: 67 IN | DIASTOLIC BLOOD PRESSURE: 78 MMHG | HEART RATE: 88 BPM | BODY MASS INDEX: 37.2 KG/M2 | OXYGEN SATURATION: 97 % | WEIGHT: 237 LBS | RESPIRATION RATE: 16 BRPM

## 2023-02-14 DIAGNOSIS — Z79.899 LONG-TERM USE OF HIGH-RISK MEDICATION: ICD-10-CM

## 2023-02-14 DIAGNOSIS — F51.04 PSYCHOPHYSIOLOGICAL INSOMNIA: ICD-10-CM

## 2023-02-14 DIAGNOSIS — M79.605 PAIN OF LEFT LOWER EXTREMITY: ICD-10-CM

## 2023-02-14 DIAGNOSIS — J45.30 MILD PERSISTENT ASTHMA WITHOUT COMPLICATION: Primary | ICD-10-CM

## 2023-02-14 DIAGNOSIS — K21.9 GASTROESOPHAGEAL REFLUX DISEASE WITHOUT ESOPHAGITIS: ICD-10-CM

## 2023-02-14 DIAGNOSIS — I10 ESSENTIAL HYPERTENSION: ICD-10-CM

## 2023-02-14 DIAGNOSIS — H91.93 DECREASED HEARING OF BOTH EARS: ICD-10-CM

## 2023-02-14 PROCEDURE — 99214 OFFICE O/P EST MOD 30 MIN: CPT | Performed by: NURSE PRACTITIONER

## 2023-02-14 RX ORDER — EPINEPHRINE 0.15 MG/.3ML
0.15 INJECTION INTRAMUSCULAR
COMMUNITY

## 2023-02-14 RX ORDER — CYCLOBENZAPRINE HCL 5 MG
TABLET ORAL
COMMUNITY
Start: 2023-01-16 | End: 2023-02-14 | Stop reason: SDUPTHER

## 2023-02-14 RX ORDER — LEVOCETIRIZINE DIHYDROCHLORIDE 5 MG/1
TABLET, FILM COATED ORAL
COMMUNITY
Start: 2023-01-24

## 2023-02-14 RX ORDER — FLUTICASONE PROPIONATE 110 UG/1
1 AEROSOL, METERED RESPIRATORY (INHALATION)
Qty: 12 G | Refills: 2 | Status: SHIPPED | OUTPATIENT
Start: 2023-02-14

## 2023-02-14 NOTE — PROGRESS NOTES
Subjective   Jaquan Mckay is a 52 y.o. male.     Chief Complaint   Patient presents with   • Hypertension       History of Present Illness     Hypertension-chronic and ongoing.  Patient is currently taking lisinopril 30 mg.  No negative side effects.  He is under the care of cardiology.  He denies any recent chest pain.  He is not monitoring BP as he does not have a cuff.    Insomnia-chronic and ongoing.  Patient is currently taking Remeron 30 mg 1.5 tablets daily and temazepam 30 mg as needed nightly.  He reports that his sleep is fair.    Chronic allergies and asthma-under the care of allergy asthma specialist.  He is currently on Flonase nasal spray, Flovent inhaler, Astelin nasal spray, Singulair 10 mg, and Claritin 10 mg.  He is also on immunotherapy.  He is tolerating injections well.  He reports he had a PFT recently.  According to the report he has normal PFT's.  He is currently on 44 mcg but has been on 220 mcg in the past before an insurance problem and lack of coverage.  Hyperlipidemia-chronic and ongoing.  Patient is currently taking simvastatin 10 mg.  No negative side effects.  Patient denies any negative side effects of cholesterol medication.  No reported myalgia or myopathies.  GI upset-under the care of GI.  He receives Dexilant 60 mg for GERD and cholestyramine 4 g powder to help with his chronic diarrhea.  He does have recurrent reflux.  No new concerns today.   Ankle pain-on the left.  Will be sent for a nerve conduction study.  He is having numbness in his first 3 toes and along his arch to the medial heel.  He reports a fall on Feb 3rd. He has some ecchymosis over his left tib/fib and PT would like to have imaging of the area before they proceed with strenuous therapy.  He has been elevating his extremity.      The following portions of the patient's history were reviewed and updated as appropriate: CC, ROS, allergies, current medications, past family history, past medical history, past  "social history, past surgical history and problem list.    Review of Systems   Constitutional: Negative for activity change, appetite change, fatigue and fever.   HENT: Positive for hearing loss and tinnitus (right ear). Negative for congestion, ear pain, facial swelling, nosebleeds, rhinorrhea, sinus pressure, sore throat and trouble swallowing.    Eyes: Negative for blurred vision, double vision and redness.   Respiratory: Negative for cough, chest tightness, shortness of breath and wheezing.    Cardiovascular: Negative for chest pain, palpitations and leg swelling.   Gastrointestinal: Positive for abdominal pain. Negative for blood in stool, constipation and diarrhea.   Endocrine: Negative.    Genitourinary: Negative for dysuria, flank pain, frequency, hematuria and urgency.   Musculoskeletal: Positive for back pain.   Skin: Negative.    Allergic/Immunologic: Negative.    Neurological: Positive for headache. Negative for dizziness, weakness and light-headedness.   Hematological: Negative.    Psychiatric/Behavioral: Negative for self-injury, sleep disturbance and suicidal ideas. The patient is not nervous/anxious.    All other systems reviewed and are negative.      Objective     /78   Pulse 88   Temp 98.4 °F (36.9 °C)   Resp 16   Ht 170.2 cm (67.01\")   Wt 108 kg (237 lb)   SpO2 97%   BMI 37.11 kg/m²     Physical Exam  Vitals reviewed.   Constitutional:       General: He is not in acute distress.     Appearance: He is well-developed. He is obese. He is not diaphoretic.   HENT:      Head: Normocephalic and atraumatic.      Jaw: No tenderness.      Comments: Oropharynx not examined.  Patient is presently wearing a face covering/mask due to COVID-19 pandemic.     Right Ear: Hearing, tympanic membrane, ear canal and external ear normal.      Left Ear: Hearing, tympanic membrane, ear canal and external ear normal.   Eyes:      General: Lids are normal. No scleral icterus.     Extraocular Movements:      " Right eye: Normal extraocular motion and no nystagmus.      Left eye: Normal extraocular motion and no nystagmus.      Conjunctiva/sclera: Conjunctivae normal.      Pupils: Pupils are equal, round, and reactive to light.   Neck:      Thyroid: No thyromegaly or thyroid tenderness.      Vascular: No carotid bruit or JVD.      Trachea: No tracheal tenderness.   Cardiovascular:      Rate and Rhythm: Normal rate and regular rhythm.      Pulses:           Dorsalis pedis pulses are 2+ on the right side and 2+ on the left side.        Posterior tibial pulses are 2+ on the right side and 2+ on the left side.      Heart sounds: Normal heart sounds, S1 normal and S2 normal. No murmur heard.  Pulmonary:      Effort: Pulmonary effort is normal. No accessory muscle usage, prolonged expiration or respiratory distress.      Breath sounds: Normal breath sounds.   Chest:      Chest wall: No tenderness.   Abdominal:      General: Bowel sounds are normal. There is no distension.      Palpations: Abdomen is soft. There is no hepatomegaly, splenomegaly or mass.      Tenderness: There is no abdominal tenderness.   Musculoskeletal:         General: Tenderness present.      Cervical back: Normal range of motion and neck supple.      Thoracic back: Tenderness present.      Lumbar back: Tenderness present.      Right lower leg: No edema.      Left lower leg: Tenderness present. No edema.      Comments: No muscular atrophy or flaccidity.  Multiple varicosities to BLE   Lymphadenopathy:      Head:      Right side of head: No submental or submandibular adenopathy.      Left side of head: No submental or submandibular adenopathy.      Cervical: No cervical adenopathy.      Right cervical: No superficial cervical adenopathy.     Left cervical: No superficial cervical adenopathy.   Skin:     General: Skin is warm and dry.      Capillary Refill: Capillary refill takes less than 2 seconds.      Coloration: Skin is not jaundiced or pale.       Findings: No erythema.      Nails: There is no clubbing.   Neurological:      Mental Status: He is alert and oriented to person, place, and time.      Cranial Nerves: No cranial nerve deficit or facial asymmetry.      Sensory: No sensory deficit.      Motor: No weakness, tremor, atrophy or abnormal muscle tone.      Coordination: Coordination normal.      Gait: Gait abnormal (antalgic).      Deep Tendon Reflexes: Reflexes are normal and symmetric.   Psychiatric:         Attention and Perception: He is attentive.         Mood and Affect: Mood normal. Mood is not anxious or depressed.         Speech: Speech normal.         Behavior: Behavior normal. Behavior is cooperative.         Thought Content: Thought content normal.         Cognition and Memory: Cognition normal.         Judgment: Judgment normal.           Diagnoses and all orders for this visit:    1. Mild persistent asthma without complication (Primary)  Comments:  continue under care of asthma specialist.  Continue Inhalers for breathing maintenance.   Orders:  -     fluticasone (Flovent HFA) 110 MCG/ACT inhaler; Inhale 1 puff 2 (Two) Times a Day.  Dispense: 12 g; Refill: 2    2. Essential hypertension  Assessment & Plan:  Continue lisinopril 30 mg.  Continue under the care of cardiology.  Ambulatory BP monitoring either at home or random community checks.  Patient to report continued elevations >140/90.  Patient may come by office for checks if needed.       3. Pain of left lower extremity  Comments:  imaging ordered.  Orders:  -     XR tibia fibula 2 vw left    4. Decreased hearing of both ears  Comments:  Audiology referral  Orders:  -     Ambulatory Referral to Audiology    5. Psychophysiological insomnia  Overview:  With depression and anxiety      Assessment & Plan:  Continue Remeron and Restoril.  Continue to work on sleep Hygiene.      6. Long-term use of high-risk medication  Comments:  uds for med compliance  Orders:  -     Urine Drug Screen -  Urine, Clean Catch; Future    7. Gastroesophageal reflux disease without esophagitis  Assessment & Plan:  Continue Dexilant 60 mg and pepcid 40 mg BID as directed.  Continue under the care of GI as directed         Class 2 Severe Obesity (BMI >=35 and <=39.9). Obesity-related health conditions include the following: hypertension, coronary heart disease, dyslipidemias, GERD and osteoarthritis. Obesity is improving with lifestyle modifications. BMI is is above average. We discussed portion control and increasing exercise.       Understands disease processes and need for medications.  Understands reasons for urgent and emergent care.  Patient (& family) verbalized agreement for treatment plan.   Emotional support and active listening provided.  Patient provided time to verbalize feelings.    [facility] reviewed today and consistent.  Will refill prescribed controlled medication today.  Patient is aware they cannot receive narcotics from any other provider except if under care of pain management or speciality clinic.  Risk and benefits of medication use has been reviewed.  History and physical exam exhibit continued safe and appropriate use of controlled substances.  The patient is aware of the potential for addiction and dependence.  This patient has been made aware of the appropriate use of such medications, including potential risk of somnolence, limited ability to drive and / or work safely, and potential for overdose.  Patient understands not to take medication and drive until they know how medication may affect their cognition/decision making.   It has also been made clear that these medications are for use by this patient only, without concomitant use of alcohol or other substances unless prescribed/advised by medical provider.  Patient understands they may be subject to UDS and pill counts at random.    Patient considered to be low risk for addiction due to use of single controlled medications.  Patient understands  and accepts these risks.  Patient need for medication will be reassessed at each visit.  Doses will be adjusted according to patient need and findings.    Goal of [place]: Patient will not have any adverse reactions of medication.  Patient will have improvement in sleep hygiene/pattern with use of Restoril as needed as directed.  Medication Dispense Information    Temazepam   Dispensed: 2023 12:00 AM   Written:  2022   Unit strength: 30MG   Days supply: 30   Quantity: 30 each   Refills remainin   Pharmacy: [facility], [facility]   Authorizing provider: LENNOX ROE   Received from: [facility] (Fill History)   Brand or Generic:       RTC 1 month, sooner if needed.             This document has been electronically signed by:  BALDOMERO Thao, NESHAP-C    Dragon disclaimer:  Part of this note may be an electronic transcription/translation of spoken language to printed text using the Dragon Dictation System.

## 2023-02-15 ENCOUNTER — TREATMENT (OUTPATIENT)
Dept: PHYSICAL THERAPY | Facility: CLINIC | Age: 53
End: 2023-02-15
Payer: COMMERCIAL

## 2023-02-15 DIAGNOSIS — S86.312A TEAR OF PERONEAL TENDON OF LEFT FOOT: ICD-10-CM

## 2023-02-15 DIAGNOSIS — M25.572 LEFT ANKLE PAIN, UNSPECIFIED CHRONICITY: Primary | ICD-10-CM

## 2023-02-15 DIAGNOSIS — R26.89 ANTALGIC GAIT: ICD-10-CM

## 2023-02-15 PROCEDURE — 97110 THERAPEUTIC EXERCISES: CPT | Performed by: PHYSICAL THERAPIST

## 2023-02-15 PROCEDURE — 97035 APP MDLTY 1+ULTRASOUND EA 15: CPT | Performed by: PHYSICAL THERAPIST

## 2023-02-15 PROCEDURE — 97530 THERAPEUTIC ACTIVITIES: CPT | Performed by: PHYSICAL THERAPIST

## 2023-02-15 NOTE — PROGRESS NOTES
Physical Therapy Daily Treatment Note      Patient: Jaquan Mckay   : 1970  Referring practitioner: Char Xiao DPM  Date of Initial Visit: Type: THERAPY  Noted: 2023  Today's Date: 2/15/2023  Patient seen for 11 sessions       Visit Diagnoses:    ICD-10-CM ICD-9-CM   1. Left ankle pain, unspecified chronicity  M25.572 719.47   2. Tear of peroneal tendon of left foot  S86.312A 844.8   3. Antalgic gait  R26.89 781.2       Subjective Evaluation    History of Present Illness    Subjective comment: Patient notes that he has 8/10 pain today.  He states that he believes increased pain is from trying to walk without his boot yesterday.Pain  Current pain ratin           Objective   See Exercise, Manual, and Modality Logs for complete treatment.       Assessment & Plan     Assessment    Assessment details: Therapy session initiated with MH to the left ankle, followed by there ex and therapeutic activities.  There ex and therapeutic activities focused on ankle ROM, intrinsic strengthening, LE strengthening, and functional activities.  Patient instructed to perform exercises per his tolerance and rest as necessary, with patient verbalizing understanding.  Session was concluded with pulsed US to the left ankle, with no adverse reactions.  He noted a decrease in pain to 6/10 post-tx.  Patient will continue to be progressed per his tolerance and POC.          Timed:         Manual Therapy:         mins  88422;     Therapeutic Exercise:    32     mins  56363;     Neuromuscular Jazmin:        mins  84184;    Therapeutic Activity:     15     mins  15622;     Gait Training:           mins  22400;     Ultrasound:     8     mins  22720;    Ionto                                   mins   71748  Self Care                            mins   17939  Canalith Repos         mins 84874      Un-Timed:  Electrical Stimulation:         mins  79956 ( );  Dry Needling          mins self-pay  Traction          mins  13095  Moist Heat-5 min    Timed Treatment:   55   mins   Total Treatment:     60   mins    Hanna Burks PT  [place] License: [id number]  Electronically signed by Hanna Burks PT, 02/15/23, 2:04 PM EST.

## 2023-02-16 DIAGNOSIS — M79.672 LEFT FOOT PAIN: ICD-10-CM

## 2023-02-16 DIAGNOSIS — M62.838 MUSCLE SPASM: ICD-10-CM

## 2023-02-16 DIAGNOSIS — N40.0 BPH WITHOUT OBSTRUCTION/LOWER URINARY TRACT SYMPTOMS: ICD-10-CM

## 2023-02-16 RX ORDER — TAMSULOSIN HYDROCHLORIDE 0.4 MG/1
CAPSULE ORAL
Qty: 30 CAPSULE | Refills: 5 | Status: SHIPPED | OUTPATIENT
Start: 2023-02-16

## 2023-02-16 RX ORDER — CYCLOBENZAPRINE HCL 5 MG
5-10 TABLET ORAL 3 TIMES DAILY PRN
Qty: 120 TABLET | Refills: 2 | Status: SHIPPED | OUTPATIENT
Start: 2023-02-16

## 2023-02-17 ENCOUNTER — TREATMENT (OUTPATIENT)
Dept: PHYSICAL THERAPY | Facility: CLINIC | Age: 53
End: 2023-02-17
Payer: COMMERCIAL

## 2023-02-17 DIAGNOSIS — M25.572 LEFT ANKLE PAIN, UNSPECIFIED CHRONICITY: Primary | ICD-10-CM

## 2023-02-17 DIAGNOSIS — S86.312A TEAR OF PERONEAL TENDON OF LEFT FOOT: ICD-10-CM

## 2023-02-17 DIAGNOSIS — R26.89 ANTALGIC GAIT: ICD-10-CM

## 2023-02-17 PROCEDURE — 97530 THERAPEUTIC ACTIVITIES: CPT | Performed by: PHYSICAL THERAPIST

## 2023-02-17 PROCEDURE — 97035 APP MDLTY 1+ULTRASOUND EA 15: CPT | Performed by: PHYSICAL THERAPIST

## 2023-02-17 PROCEDURE — 97110 THERAPEUTIC EXERCISES: CPT | Performed by: PHYSICAL THERAPIST

## 2023-02-17 NOTE — PROGRESS NOTES
Physical Therapy Daily Treatment Note      Patient: Jaquan Mckay   : 1970  Referring practitioner: Char Xiao DPM  Date of Initial Visit: Type: THERAPY  Noted: 2023  Today's Date: 2023  Patient seen for 12 sessions       Visit Diagnoses:    ICD-10-CM ICD-9-CM   1. Left ankle pain, unspecified chronicity  M25.572 719.47   2. Tear of peroneal tendon of left foot  S86.312A 844.8   3. Antalgic gait  R26.89 781.2       Subjective: Patient reports 8/10 left ankle pain upon arrival to therapy.     Objective   See Exercise, Manual, and Modality Logs for complete treatment.       Assessment/Plan: Patient tolerated treatment fairly well today with reports of slight decrease in ankle pain following, 7/10. Session initiated with standing closed chain stability and LE strengthening activities with boot donned f/b therapeutic activities to assist with ADL's and conclusion of pulsed US. Exercise progressed with step ups progressed from 4 to 6 inch, and additional standing strengthening activities initiated including standing hip abduction and st. ham curls. Pt observed with good tolerance and was provided with cues and demonstration for form and for max benefit. No signs of distress or adverse reactions observed during, and/ or following tx. Pt will be progressed with therapy as tolerated to address goals, reduce pain and improve mobility. Continue with PT's POC.       Timed:         Manual Therapy:         mins  63839;     Therapeutic Exercise:    34     mins  94686;     Neuromuscular Jazmin:        mins  44542;    Therapeutic Activity:    12      mins  75546;     Gait Training:           mins  60759;     Ultrasound:     8     mins  36492;    Ionto                                   mins   00089  Self Care                            mins   40109  Canalith Repos         mins 84727      Un-Timed:  Electrical Stimulation:         mins  88568 ( );  Dry Needling          mins self-pay  Traction           mins 87053      Timed Treatment:   54   mins   Total Treatment:    58    mins (CP: x 4 min)     Leighann Brown. [place], NEIL  [place] License: [id number]

## 2023-02-20 ENCOUNTER — TREATMENT (OUTPATIENT)
Dept: PHYSICAL THERAPY | Facility: CLINIC | Age: 53
End: 2023-02-20
Payer: COMMERCIAL

## 2023-02-20 DIAGNOSIS — R26.89 ANTALGIC GAIT: ICD-10-CM

## 2023-02-20 DIAGNOSIS — S86.312A TEAR OF PERONEAL TENDON OF LEFT FOOT: ICD-10-CM

## 2023-02-20 DIAGNOSIS — M25.572 LEFT ANKLE PAIN, UNSPECIFIED CHRONICITY: Primary | ICD-10-CM

## 2023-02-20 PROCEDURE — 97110 THERAPEUTIC EXERCISES: CPT | Performed by: PHYSICAL THERAPIST

## 2023-02-20 PROCEDURE — 97530 THERAPEUTIC ACTIVITIES: CPT | Performed by: PHYSICAL THERAPIST

## 2023-02-20 NOTE — PROGRESS NOTES
Physical Therapy Daily Treatment Note/Progress Report      Patient: Jaquan Mckay   : 1970  Referring practitioner: Char Xiao DPM  Date of Initial Visit: Type: THERAPY  Noted: 2023  Today's Date: 2023  Patient seen for 13 sessions       Visit Diagnoses:    ICD-10-CM ICD-9-CM   1. Left ankle pain, unspecified chronicity  M25.572 719.47   2. Tear of peroneal tendon of left foot  S86.312A 844.8   3. Antalgic gait  R26.89 781.2       Subjective Evaluation    History of Present Illness    Subjective comment: Pt reports having 9/10 pain today.Pain  Current pain ratin  At best pain ratin  At worst pain rating: 10  Location: left foot           Objective          Active Range of Motion   Left Ankle/Foot   Dorsiflexion (ke): 5 degrees   Plantar flexion: 55 degrees   Inversion: 40 degrees   Eversion: 15 degrees     Strength/Myotome Testing     Left Ankle/Foot   Dorsiflexion: 4  Plantar flexion: 4  Inversion: 4-  Eversion: 4-    Ambulation     Comments   Amb with decreased velocity with decreased stance on left      See Exercise, Manual, and Modality Logs for complete treatment.       Assessment & Plan     Assessment  Impairments: abnormal coordination, abnormal gait, abnormal muscle firing, abnormal or restricted ROM, activity intolerance, impaired balance, impaired physical strength, lacks appropriate home exercise program, pain with function and weight-bearing intolerance  Functional Limitations: walking, uncomfortable because of pain, standing and unable to perform repetitive tasks  Assessment details: Pt is a 51 y/o male referred to therapy for rehab following a left peroneal tear.  Pt has attended 13 sessions with good effort.  Pt did demonstrate improvements with ankle ROM.  Pt improved his LEFS by 11 points from 84% to 73%.  He also demonstrated gains with ankle stability.  Pt does cont to present with increased pain that ranked between 6-10/10.  Pt reported 7/10 post pain  today.  Prognosis: good    Goals  Plan Goals: STG 6 weeks    1 Pt will be instructed in a HEP.progressing  2 Pt will improve his ankle ROM by 10 degrees for both DF and PF.met  3 Pt will report pain no greater 6/10. Not met    LTG 12 weeks    1 Pt will improve his LEFs to less than 30%.not met  2 Pt will demonstrate 4/5 gross left ankle stability.not met  3 Pt will report pain no greater than 3/10 with increased walking.not met  4 Pt will amb with with improved symmetry and velocity.not met    Plan  Therapy options: will be seen for skilled therapy services  Planned modality interventions: cryotherapy, thermotherapy (hydrocollator packs) and ultrasound  Planned therapy interventions: ADL retraining, balance/weight-bearing training, flexibility, functional ROM exercises, gait training, home exercise program, IADL retraining, joint mobilization, manual therapy, motor coordination training, neuromuscular re-education, soft tissue mobilization, strengthening, stretching, transfer training and therapeutic activities  Frequency: 1x week  Duration in weeks: 8  Treatment plan discussed with: patient  Plan details: Will follow for optimal gains.  Moderate Evaluation  44722  Re-evaluation   60679  Therapeutic exercise  90711  Therapeutic activity    54696  Neuromuscular re-education   69809  Manual therapy   56878  Gait training  90683  Unattended e-stim (Medicaid/Medicare)     Moist heat/cryotherapy 38351   Ultrasound   46673              Timed:         Manual Therapy:         mins  18235;     Therapeutic Exercise:    15     mins  84476;     Neuromuscular Jazmin:        mins  35739;    Therapeutic Activity:     10     mins  99422;     Gait Training:           mins  54626;     Ultrasound:          mins  84745;  (no charge due to shared min with medicare patient)  Ionto                                   mins   80908  Self Care                            mins   48081  Canalith Repos         mins  19293      Un-Timed:  Electrical Stimulation:         mins  84196 ( );  Dry Needling          mins self-pay  Traction          mins 71403      Timed Treatment:   25   mins   Total Treatment:     35   Mins(5 min mh and 5 min ice)    Win Rodriguez PT  [place] License: [id number]      Electronically signed by Win Rodriguez PT, 02/20/23, 2:35 PM EST

## 2023-03-08 ENCOUNTER — TREATMENT (OUTPATIENT)
Dept: PHYSICAL THERAPY | Facility: CLINIC | Age: 53
End: 2023-03-08
Payer: COMMERCIAL

## 2023-03-08 DIAGNOSIS — S86.312A TEAR OF PERONEAL TENDON OF LEFT FOOT: ICD-10-CM

## 2023-03-08 DIAGNOSIS — M25.572 LEFT ANKLE PAIN, UNSPECIFIED CHRONICITY: Primary | ICD-10-CM

## 2023-03-08 DIAGNOSIS — R26.89 ANTALGIC GAIT: ICD-10-CM

## 2023-03-08 PROCEDURE — 97035 APP MDLTY 1+ULTRASOUND EA 15: CPT | Performed by: PHYSICAL THERAPIST

## 2023-03-08 PROCEDURE — 97530 THERAPEUTIC ACTIVITIES: CPT | Performed by: PHYSICAL THERAPIST

## 2023-03-08 PROCEDURE — 97110 THERAPEUTIC EXERCISES: CPT | Performed by: PHYSICAL THERAPIST

## 2023-03-08 NOTE — PROGRESS NOTES
Physical Therapy Daily Treatment Note      Patient: Jaquan Mckay   : 1970  Referring practitioner: Char Xiao DPM  Date of Initial Visit: Type: THERAPY  Noted: 2023  Today's Date: 3/8/2023  Patient seen for 14 sessions       Visit Diagnoses:    ICD-10-CM ICD-9-CM   1. Left ankle pain, unspecified chronicity  M25.572 719.47   2. Tear of peroneal tendon of left foot  S86.312A 844.8   3. Antalgic gait  R26.89 781.2       Subjective: Patient reports 10/10 left ankle pain upon arrival to therapy. Patient states he continues to utilize his walking boot, and is scheduled to f/u with referring ortho on 23 to discuss results of nerve study.     Objective   See Exercise, Manual, and Modality Logs for complete treatment.       Assessment/Plan: Patient completed today's session with reports of slight decrease in ankle pain following, 7/10. Treatment performed including therex and therapeutic activities as listed to assist with improved left LE/ left ankle strength, improved stability and to assist with transitioning stairs and home environment. Pt educated to perform activities to his tolerance, due to elevated pain levels upon arrival; pt verbalized understanding. Pulsed US completed at conclusion. Pt continues to benefit from therapy services and will be progressed as tolerated to address goals and reduce pain. No adverse reactions observed during, and/ or following tx. Continue with PT's POC.       Timed:         Manual Therapy:         mins  16600;     Therapeutic Exercise:    32     mins  48994;     Neuromuscular Jazmin:        mins  22227;    Therapeutic Activity:     10     mins  31311;     Gait Training:           mins  40861;     Ultrasound:     8     mins  55894;    Ionto                                   mins   15881  Self Care                           mins   43304  Canalith Repos         mins 82121      Un-Timed:  Electrical Stimulation:         mins  84738 ( );  Dry Needling           mins self-pay  [facility]          mins 41938      Timed Treatment:   50   mins   Total Treatment:     55   mins (MH: x 5 min)    Leighann Lancaster PTA  [place] License: [id number]

## 2023-03-13 ENCOUNTER — TREATMENT (OUTPATIENT)
Dept: PHYSICAL THERAPY | Facility: CLINIC | Age: 53
End: 2023-03-13
Payer: COMMERCIAL

## 2023-03-13 DIAGNOSIS — S86.312A TEAR OF PERONEAL TENDON OF LEFT FOOT: ICD-10-CM

## 2023-03-13 DIAGNOSIS — R26.89 ANTALGIC GAIT: ICD-10-CM

## 2023-03-13 DIAGNOSIS — M25.572 LEFT ANKLE PAIN, UNSPECIFIED CHRONICITY: Primary | ICD-10-CM

## 2023-03-13 PROCEDURE — 97110 THERAPEUTIC EXERCISES: CPT | Performed by: PHYSICAL THERAPIST

## 2023-03-13 PROCEDURE — 97530 THERAPEUTIC ACTIVITIES: CPT | Performed by: PHYSICAL THERAPIST

## 2023-03-13 NOTE — PROGRESS NOTES
Physical Therapy Daily Treatment Note      Patient: Jaquan Mckay   : 1970  Referring practitioner: Char Xiao DPM  Date of Initial Visit: Type: THERAPY  Noted: 2023  Today's Date: 3/13/2023  Patient seen for 15 sessions       Visit Diagnoses:    ICD-10-CM ICD-9-CM   1. Left ankle pain, unspecified chronicity  M25.572 719.47   2. Tear of peroneal tendon of left foot  S86.312A 844.8   3. Antalgic gait  R26.89 781.2       Subjective: Patient reports 8/10 left ankle pain upon arrival to therapy. Pt states he is scheduled to f/u with orthopedic next week.     Objective   See Exercise, Manual, and Modality Logs for complete treatment.       Assessment/Plan: Pt ambulates into clinic wearing lace up ankle brace on left with tennis shoe, and utilizing cane. Pt observed with antalgic gait and decreased weight bearing on left. Session consisted of therex and therapeutic activities as listed with continued focus on improved left ankle/ LE strength, improved stability and to ease difficulty with ADL's f/b pulsed US and cryotherapy at conclusion. Exercise progressed with resistance of tband increased from yellow to red and additional standing activities added including semi tandem stance. Pt observed with good tolerance and was provided with cues and demonstration for form. No signs of distress or adverse reactions observed during, and/ or following tx. Continue with PT's POC.     Session assisted by Win RodriguezPT.    Timed:         Manual Therapy:         mins  53798;     Therapeutic Exercise:    30     mins  05874;     Neuromuscular Jazmin:        mins  23909;    Therapeutic Activity:     9     mins  16879;     Gait Training:           mins  58480;     Ultrasound:     8     mins  50621;    Ionto                                   mins   66568  Self Care                            mins   39103  Canalith Repos         mins 55259      Un-Timed:  Electrical Stimulation:         mins  04204 ( );  Dry  Needling          mins self-pay  Traction          mins 41257      Timed Treatment:  47    mins   Total Treatment:    52    mins (CP: x 5 min)     Leighann Brown. [place], NEIL  [place] License: [id number]

## 2023-03-14 ENCOUNTER — OFFICE VISIT (OUTPATIENT)
Dept: FAMILY MEDICINE CLINIC | Facility: CLINIC | Age: 53
End: 2023-03-14
Payer: COMMERCIAL

## 2023-03-14 VITALS
DIASTOLIC BLOOD PRESSURE: 82 MMHG | RESPIRATION RATE: 18 BRPM | TEMPERATURE: 98.4 F | HEIGHT: 67 IN | HEART RATE: 94 BPM | BODY MASS INDEX: 36.29 KG/M2 | WEIGHT: 231.2 LBS | OXYGEN SATURATION: 97 % | SYSTOLIC BLOOD PRESSURE: 126 MMHG

## 2023-03-14 DIAGNOSIS — R06.02 SHORTNESS OF BREATH: ICD-10-CM

## 2023-03-14 DIAGNOSIS — I10 ESSENTIAL HYPERTENSION: Primary | ICD-10-CM

## 2023-03-14 DIAGNOSIS — E87.6 HYPOKALEMIA: ICD-10-CM

## 2023-03-14 DIAGNOSIS — E78.2 MIXED HYPERLIPIDEMIA: ICD-10-CM

## 2023-03-14 DIAGNOSIS — M79.672 LEFT FOOT PAIN: ICD-10-CM

## 2023-03-14 DIAGNOSIS — E55.9 VITAMIN D DEFICIENCY: ICD-10-CM

## 2023-03-14 DIAGNOSIS — R56.9 SEIZURE: ICD-10-CM

## 2023-03-14 DIAGNOSIS — J30.1 SEASONAL ALLERGIC RHINITIS DUE TO POLLEN: ICD-10-CM

## 2023-03-14 DIAGNOSIS — R53.83 OTHER FATIGUE: ICD-10-CM

## 2023-03-14 DIAGNOSIS — K21.9 GASTROESOPHAGEAL REFLUX DISEASE WITHOUT ESOPHAGITIS: ICD-10-CM

## 2023-03-14 RX ORDER — DEXLANSOPRAZOLE 60 MG/1
60 CAPSULE, DELAYED RELEASE ORAL 2 TIMES DAILY
Qty: 60 CAPSULE | Refills: 0 | Status: SHIPPED | OUTPATIENT
Start: 2023-03-14

## 2023-03-14 RX ORDER — METHOCARBAMOL 750 MG/1
TABLET, FILM COATED ORAL
COMMUNITY
Start: 2023-02-16

## 2023-03-14 NOTE — PROGRESS NOTES
"Subjective   Jaquan Mckay is a 52 y.o. male.     Chief Complaint   Patient presents with   • Hypertension       History of Present Illness     Hypertension-chronic and ongoing.  Patient is currently under the care of cardiology.  He is currently ordered lisinopril 30 mg.  No negative side effects.  Seizure disorder-patient is currently on Dilantin 100 mg 2 capsules twice daily.  He reports he has had 2 small seizures.    He reports that he can tell when it is coming and he gets a cold chill.  Both seizures were short in duration.    Left foot pain-ongoing.  He has had nerve test at Dr Florez, podiatry.  He will go back Next Monday for his results.  He is wearing his brace today.   He reports that from his arch to his mid calf posteriorly.  He reports it feels \"like bone is being ripped out\".  He reports that today that his first, second, and third toe is stinging like it trying to wake up.    Insomnia-chronic and ongoing.  Patient is currently taking Restoril 30 mg and Remeron 45 mg.  He is followed by [facility] for therapy.  He continues to struggle with sleep pattern.  He also continues to have some depression and anxiety symptoms.  Memory changes-he reports for at least 2 months.  It began after his fall with a injury to his head on January 3, 2023.  He reports that he has been mixing up dates.   At times he has to be reminded of details of events.  He has missed some appts due to date confusion.    Allergy concern-reports he has had to have a decrease in dose of his immunotherapy.  He has started the decreased dose last week.  He is having some congestion.  He reports he continues singulair and his claritin.  He reports he is having some sinus HA.  He has noted some dizziness and left ear pain. He is noting a non productive cough.  No sore throat.  He reports PND.  No specific changes in his breathing.  He has had a negative sleep apnea study in the past but due to needing to sleep on multiple pillows would like " "to have an updated evaluation.    The following portions of the patient's history were reviewed and updated as appropriate: CC, ROS, allergies, current medications, past family history, past medical history, past social history, past surgical history and problem list.    Review of Systems   Constitutional: Positive for fatigue. Negative for activity change, appetite change and fever.   HENT: Negative for congestion, ear pain, facial swelling, nosebleeds, rhinorrhea, sinus pressure, sore throat and trouble swallowing.    Eyes: Negative for blurred vision, double vision and redness.   Respiratory: Negative for cough, chest tightness, shortness of breath and wheezing.    Cardiovascular: Negative for chest pain, palpitations and leg swelling.   Gastrointestinal: Positive for abdominal pain. Negative for blood in stool, constipation, diarrhea, nausea and vomiting.   Endocrine: Negative.    Genitourinary: Negative for dysuria, flank pain, frequency, hematuria and urgency.   Musculoskeletal: Positive for arthralgias, back pain, gait problem and myalgias.   Skin: Negative.    Allergic/Immunologic: Negative.    Neurological: Positive for seizures. Negative for dizziness, weakness, light-headedness and headache.   Hematological: Negative.    Psychiatric/Behavioral: Positive for decreased concentration, dysphoric mood and stress. Negative for self-injury, sleep disturbance and suicidal ideas. The patient is not nervous/anxious.    All other systems reviewed and are negative.      Objective     /82   Pulse 94   Temp 98.4 °F (36.9 °C)   Resp 18   Ht 170.2 cm (67.01\")   Wt 105 kg (231 lb 3.2 oz)   SpO2 97%   BMI 36.20 kg/m²     Physical Exam  Vitals reviewed.   Constitutional:       General: He is not in acute distress.     Appearance: He is well-developed. He is obese. He is not diaphoretic.   HENT:      Head: Normocephalic and atraumatic.      Jaw: No tenderness.      Comments: Oropharynx not examined.  Patient is " presently wearing a face covering/mask due to COVID-19 pandemic.     Right Ear: Hearing, tympanic membrane, ear canal and external ear normal.      Left Ear: Hearing, tympanic membrane, ear canal and external ear normal.   Eyes:      General: Lids are normal. No scleral icterus.     Extraocular Movements:      Right eye: Normal extraocular motion and no nystagmus.      Left eye: Normal extraocular motion and no nystagmus.      Conjunctiva/sclera: Conjunctivae normal.      Pupils: Pupils are equal, round, and reactive to light.   Neck:      Thyroid: No thyromegaly or thyroid tenderness.      Vascular: No carotid bruit or JVD.      Trachea: No tracheal tenderness.   Cardiovascular:      Rate and Rhythm: Normal rate and regular rhythm.      Pulses:           Dorsalis pedis pulses are 2+ on the right side and 2+ on the left side.        Posterior tibial pulses are 2+ on the right side and 2+ on the left side.      Heart sounds: Normal heart sounds, S1 normal and S2 normal. No murmur heard.  Pulmonary:      Effort: Pulmonary effort is normal. No accessory muscle usage, prolonged expiration or respiratory distress.      Breath sounds: Normal breath sounds.   Chest:      Chest wall: No tenderness.   Abdominal:      General: Bowel sounds are normal. There is no distension.      Palpations: Abdomen is soft. There is no hepatomegaly, splenomegaly or mass.      Tenderness: There is no abdominal tenderness.   Musculoskeletal:         General: Tenderness present.      Cervical back: Normal range of motion and neck supple.      Thoracic back: Tenderness present.      Lumbar back: Tenderness present.      Right lower leg: No edema.      Left lower leg: Tenderness present. No edema.      Comments: No muscular atrophy or flaccidity.  Multiple varicosities to BLE   Lymphadenopathy:      Head:      Right side of head: No submental or submandibular adenopathy.      Left side of head: No submental or submandibular adenopathy.       Cervical: No cervical adenopathy.      Right cervical: No superficial cervical adenopathy.     Left cervical: No superficial cervical adenopathy.   Skin:     General: Skin is warm and dry.      Capillary Refill: Capillary refill takes less than 2 seconds.      Coloration: Skin is not jaundiced or pale.      Findings: No erythema.      Nails: There is no clubbing.   Neurological:      Mental Status: He is alert and oriented to person, place, and time.      Cranial Nerves: No cranial nerve deficit or facial asymmetry.      Sensory: No sensory deficit.      Motor: No weakness, tremor, atrophy or abnormal muscle tone.      Coordination: Coordination normal.      Gait: Gait abnormal (antalgic).      Deep Tendon Reflexes: Reflexes are normal and symmetric.   Psychiatric:         Attention and Perception: He is attentive.         Mood and Affect: Mood is anxious and depressed.         Speech: Speech normal.         Behavior: Behavior normal. Behavior is cooperative.         Thought Content: Thought content normal.         Cognition and Memory: Memory is impaired.         Judgment: Judgment normal.           Diagnoses and all orders for this visit:    1. Essential hypertension (Primary)  Comments:  Continue lisinopril as directed.  Continue under cardiology's care  Orders:  -     CBC & Differential  -     Comprehensive Metabolic Panel    2. Mixed hyperlipidemia  Comments:  Updated lipid panel ordered  Orders:  -     Lipid Panel  -     Hemoglobin A1c    3. Seizure (HCC)  -     TSH  -     T4, Free  -     Phenytoin level, total    4. Hypokalemia  Comments:  Continue potassium supplement.  We will plan for updated potassium level    5. Other fatigue  -     TSH  -     T4, Free  -     Vitamin B12    6. Gastroesophageal reflux disease without esophagitis  Comments:  Continue Dexilant 60 mg and famotidine 40 mg twice daily.  Avoid known food triggers  Orders:  -     dexlansoprazole (Dexilant) 60 MG capsule; Take 1 capsule by mouth  2 (Two) Times a Day.  Dispense: 60 capsule; Refill: 0    7. Vitamin D deficiency  -     Vitamin D,25-Hydroxy    8. Shortness of breath  Comments:  Appetite and sleep study ordered.  Orders:  -     Overnight Sleep Oximetry Study    9. Left foot pain  Comments:  Continue under the care of podiatry/orthopedic    10. Seasonal allergic rhinitis due to pollen  Assessment & Plan:  Continue under the care of allergy specialist.  Continue nasal spray And Xyzal as directed.         Understands disease processes and need for medications.  Understands reasons for urgent and emergent care.  Patient (& family) verbalized agreement for treatment plan.   Emotional support and active listening provided.  Patient provided time to verbalize feelings.    [facility] reviewed today and consistent.  Will refill prescribed controlled medication today.  Patient is aware they cannot receive narcotics from any other provider except if under care of pain management or speciality clinic.  Risk and benefits of medication use has been reviewed.  History and physical exam exhibit continued safe and appropriate use of controlled substances.  The patient is aware of the potential for addiction and dependence.  This patient has been made aware of the appropriate use of such medications, including potential risk of somnolence, limited ability to drive and / or work safely, and potential for overdose.    It has also been made clear that these medications are for use by this patient only, without concomitant use of alcohol or other substances unless prescribed/advised by medical provider.  Patient understands they may be subject to UDS and pill counts at random.      Patient considered to be low risk for addiction due to use of single controlled medications.  Patient understands and accepts these risks.  Patient need for medication will be reassessed at each visit.  Doses will be adjusted according to patient need and findings.    Goal of [place]: Patient  will not have any adverse reactions of medication.  Patient will have improvement in sleep hygiene/pattern with use of Restoril as needed as directed.    Medication Dispense Information    Temazepam   Dispensed: 2023 12:00 AM   Written:  2023   Unit strength: 30MG   Days supply: 30   Quantity: 30 each   Refills remainin   Pharmacy: [facility], [facility]   Authorizing provider: LENNOX ROE   Received from: [facility] (Fill History)   Brand or Generic:       RTC 1 month, sooner if needed for problems or concerns            This document has been electronically signed by:  BALDOMERO Thao, NESHAP-C    Dragon disclaimer:  Part of this note may be an electronic transcription/translation of spoken language to printed text using the Dragon Dictation System.

## 2023-03-15 ENCOUNTER — TREATMENT (OUTPATIENT)
Dept: PHYSICAL THERAPY | Facility: CLINIC | Age: 53
End: 2023-03-15
Payer: COMMERCIAL

## 2023-03-15 DIAGNOSIS — M25.572 LEFT ANKLE PAIN, UNSPECIFIED CHRONICITY: Primary | ICD-10-CM

## 2023-03-15 DIAGNOSIS — R26.89 ANTALGIC GAIT: ICD-10-CM

## 2023-03-15 DIAGNOSIS — S86.312A TEAR OF PERONEAL TENDON OF LEFT FOOT: ICD-10-CM

## 2023-03-15 PROCEDURE — 97110 THERAPEUTIC EXERCISES: CPT | Performed by: PHYSICAL THERAPIST

## 2023-03-15 PROCEDURE — 97530 THERAPEUTIC ACTIVITIES: CPT | Performed by: PHYSICAL THERAPIST

## 2023-03-15 PROCEDURE — 97035 APP MDLTY 1+ULTRASOUND EA 15: CPT | Performed by: PHYSICAL THERAPIST

## 2023-03-15 NOTE — PROGRESS NOTES
Physical Therapy Daily Treatment Note      Patient: Jaquan Mckay   : 1970  Referring practitioner: Char Xiao DPM  Date of Initial Visit: Type: THERAPY  Noted: 2023  Today's Date: 3/15/2023  Patient seen for 16 sessions       Visit Diagnoses:    ICD-10-CM ICD-9-CM   1. Left ankle pain, unspecified chronicity  M25.572 719.47   2. Tear of peroneal tendon of left foot  S86.312A 844.8   3. Antalgic gait  R26.89 781.2       Subjective: Patient reports 8/10 left foot pain upon arrival to therapy. Patient states he continues to have some discoloration along the outside of the foot due to the initial injury.      Objective   See Exercise, Manual, and Modality Logs for complete treatment.       Assessment/Plan: Patient demonstrated good effort during today's session and reported slight decrease in left ankle pain following, 6/10. Pt performed therex and neuro re-ed as listed to assist with strength deficits, improve stability and functional mobility; pulsed US and cryotherapy completed at conclusion. Exercise progressed with additional strengthening activities added, as to pt's tolerance. Pt reported some soreness with standing activities; however tolerable. Pt will be progressed with therapy as tolerated to address goals and reduce pain. No signs of distress or adverse reactions observed during, and/ or following tx.        Timed:         Manual Therapy:         mins  35094;     Therapeutic Exercise:    30     mins  50383;     Neuromuscular Jazmin:        mins  50635;    Therapeutic Activity:    17      mins  22572;     Gait Training:           mins  81904;     Ultrasound:      8    mins  52118;    Ionto                                   mins   04838  Self Care                            mins   13052  Canalith Repos         mins 17538      Un-Timed:  Electrical Stimulation:         mins  76095 ( );  Dry Needling          mins self-pay  Traction          mins 21283      Timed Treatment:  55    mins    Total Treatment:     58   mins (CP: x 3 min)     Leighann Brown. NEIL [place]  [place] License: [id number]

## 2023-03-16 ENCOUNTER — TELEPHONE (OUTPATIENT)
Dept: FAMILY MEDICINE CLINIC | Facility: CLINIC | Age: 53
End: 2023-03-16
Payer: COMMERCIAL

## 2023-03-22 ENCOUNTER — LAB (OUTPATIENT)
Dept: LAB | Facility: HOSPITAL | Age: 53
End: 2023-03-22
Payer: COMMERCIAL

## 2023-03-22 ENCOUNTER — TREATMENT (OUTPATIENT)
Dept: PHYSICAL THERAPY | Facility: CLINIC | Age: 53
End: 2023-03-22
Payer: COMMERCIAL

## 2023-03-22 DIAGNOSIS — R94.6 ABNORMAL THYROID FUNCTION TEST: ICD-10-CM

## 2023-03-22 DIAGNOSIS — R26.89 ANTALGIC GAIT: ICD-10-CM

## 2023-03-22 DIAGNOSIS — R53.83 OTHER FATIGUE: Primary | ICD-10-CM

## 2023-03-22 DIAGNOSIS — M25.572 LEFT ANKLE PAIN, UNSPECIFIED CHRONICITY: Primary | ICD-10-CM

## 2023-03-22 DIAGNOSIS — S86.312A TEAR OF PERONEAL TENDON OF LEFT FOOT: ICD-10-CM

## 2023-03-22 LAB
25(OH)D3 SERPL-MCNC: 68.7 NG/ML (ref 30–100)
ALBUMIN SERPL-MCNC: 4.2 G/DL (ref 3.5–5.2)
ALBUMIN/GLOB SERPL: 1.1 G/DL
ALP SERPL-CCNC: 102 U/L (ref 39–117)
ALT SERPL W P-5'-P-CCNC: 18 U/L (ref 1–41)
ANION GAP SERPL CALCULATED.3IONS-SCNC: 9.2 MMOL/L (ref 5–15)
AST SERPL-CCNC: 27 U/L (ref 1–40)
BASOPHILS # BLD AUTO: 0.02 10*3/MM3 (ref 0–0.2)
BASOPHILS NFR BLD AUTO: 0.4 % (ref 0–1.5)
BILIRUB SERPL-MCNC: 0.5 MG/DL (ref 0–1.2)
BUN SERPL-MCNC: 13 MG/DL (ref 6–20)
BUN/CREAT SERPL: 12.9 (ref 7–25)
CALCIUM SPEC-SCNC: 9.4 MG/DL (ref 8.6–10.5)
CHLORIDE SERPL-SCNC: 103 MMOL/L (ref 98–107)
CHOLEST SERPL-MCNC: 259 MG/DL (ref 0–200)
CO2 SERPL-SCNC: 25.8 MMOL/L (ref 22–29)
CREAT SERPL-MCNC: 1.01 MG/DL (ref 0.76–1.27)
DEPRECATED RDW RBC AUTO: 45.4 FL (ref 37–54)
EGFRCR SERPLBLD CKD-EPI 2021: 90.6 ML/MIN/1.73
EOSINOPHIL # BLD AUTO: 0.07 10*3/MM3 (ref 0–0.4)
EOSINOPHIL NFR BLD AUTO: 1.2 % (ref 0.3–6.2)
ERYTHROCYTE [DISTWIDTH] IN BLOOD BY AUTOMATED COUNT: 12.8 % (ref 12.3–15.4)
GLOBULIN UR ELPH-MCNC: 3.9 GM/DL
GLUCOSE SERPL-MCNC: 102 MG/DL (ref 65–99)
HBA1C MFR BLD: 5 % (ref 4.8–5.6)
HCT VFR BLD AUTO: 48 % (ref 37.5–51)
HDLC SERPL-MCNC: 70 MG/DL (ref 40–60)
HGB BLD-MCNC: 16.6 G/DL (ref 13–17.7)
IMM GRANULOCYTES # BLD AUTO: 0.01 10*3/MM3 (ref 0–0.05)
IMM GRANULOCYTES NFR BLD AUTO: 0.2 % (ref 0–0.5)
LDLC SERPL CALC-MCNC: 179 MG/DL (ref 0–100)
LDLC/HDLC SERPL: 2.52 {RATIO}
LYMPHOCYTES # BLD AUTO: 1.91 10*3/MM3 (ref 0.7–3.1)
LYMPHOCYTES NFR BLD AUTO: 33.5 % (ref 19.6–45.3)
MCH RBC QN AUTO: 33.1 PG (ref 26.6–33)
MCHC RBC AUTO-ENTMCNC: 34.6 G/DL (ref 31.5–35.7)
MCV RBC AUTO: 95.8 FL (ref 79–97)
MONOCYTES # BLD AUTO: 0.73 10*3/MM3 (ref 0.1–0.9)
MONOCYTES NFR BLD AUTO: 12.8 % (ref 5–12)
NEUTROPHILS NFR BLD AUTO: 2.96 10*3/MM3 (ref 1.7–7)
NEUTROPHILS NFR BLD AUTO: 51.9 % (ref 42.7–76)
NRBC BLD AUTO-RTO: 0 /100 WBC (ref 0–0.2)
PHENYTOIN SERPL-MCNC: 18.7 MCG/ML (ref 10–20)
PLATELET # BLD AUTO: 193 10*3/MM3 (ref 140–450)
PMV BLD AUTO: 9.3 FL (ref 6–12)
POTASSIUM SERPL-SCNC: 4.5 MMOL/L (ref 3.5–5.2)
PROT SERPL-MCNC: 8.1 G/DL (ref 6–8.5)
RBC # BLD AUTO: 5.01 10*6/MM3 (ref 4.14–5.8)
SODIUM SERPL-SCNC: 138 MMOL/L (ref 136–145)
T4 FREE SERPL-MCNC: 0.91 NG/DL (ref 0.93–1.7)
TRIGL SERPL-MCNC: 62 MG/DL (ref 0–150)
TSH SERPL DL<=0.05 MIU/L-ACNC: 2.07 UIU/ML (ref 0.27–4.2)
VIT B12 BLD-MCNC: 570 PG/ML (ref 211–946)
VLDLC SERPL-MCNC: 10 MG/DL (ref 5–40)
WBC NRBC COR # BLD: 5.7 10*3/MM3 (ref 3.4–10.8)

## 2023-03-22 PROCEDURE — 84481 FREE ASSAY (FT-3): CPT | Performed by: NURSE PRACTITIONER

## 2023-03-22 PROCEDURE — 36415 COLL VENOUS BLD VENIPUNCTURE: CPT | Performed by: NURSE PRACTITIONER

## 2023-03-22 PROCEDURE — 83036 HEMOGLOBIN GLYCOSYLATED A1C: CPT | Performed by: NURSE PRACTITIONER

## 2023-03-22 PROCEDURE — 97035 APP MDLTY 1+ULTRASOUND EA 15: CPT | Performed by: PHYSICAL THERAPIST

## 2023-03-22 PROCEDURE — 82306 VITAMIN D 25 HYDROXY: CPT | Performed by: NURSE PRACTITIONER

## 2023-03-22 PROCEDURE — 97110 THERAPEUTIC EXERCISES: CPT | Performed by: PHYSICAL THERAPIST

## 2023-03-22 PROCEDURE — 84443 ASSAY THYROID STIM HORMONE: CPT | Performed by: NURSE PRACTITIONER

## 2023-03-22 PROCEDURE — 84439 ASSAY OF FREE THYROXINE: CPT | Performed by: NURSE PRACTITIONER

## 2023-03-22 PROCEDURE — 82607 VITAMIN B-12: CPT | Performed by: NURSE PRACTITIONER

## 2023-03-22 PROCEDURE — 80185 ASSAY OF PHENYTOIN TOTAL: CPT | Performed by: NURSE PRACTITIONER

## 2023-03-22 PROCEDURE — 97530 THERAPEUTIC ACTIVITIES: CPT | Performed by: PHYSICAL THERAPIST

## 2023-03-22 PROCEDURE — 80053 COMPREHEN METABOLIC PANEL: CPT | Performed by: NURSE PRACTITIONER

## 2023-03-22 PROCEDURE — 85025 COMPLETE CBC W/AUTO DIFF WBC: CPT | Performed by: NURSE PRACTITIONER

## 2023-03-22 PROCEDURE — 80061 LIPID PANEL: CPT | Performed by: NURSE PRACTITIONER

## 2023-03-22 NOTE — PROGRESS NOTES
"  Progress Note  Patient: Jaquan Mckay   : 1970  Diagnosis/ICD-10 Code:  Left ankle pain, unspecified chronicity [M25.572]  Referring practitioner: Char Xiao DPM  Date of Initial Visit: Type: THERAPY  Noted: 2023  Today's Date: 3/22/2023  Patient seen for 17 sessions         Visit Diagnoses:    ICD-10-CM ICD-9-CM   1. Left ankle pain, unspecified chronicity  M25.572 719.47   2. Tear of peroneal tendon of left foot  S86.312A 844.8   3. Antalgic gait  R26.89 781.2         Subjective Questionnaire: LEFS:   Clinical Progress: improved  Treatment has included: therapeutic exercise, therapeutic activity and ultrasound      Subjective Evaluation    History of Present Illness    Subjective comment: Pt reports 8/10 left ankle pain prior to today's treatment session. The patient arrived with written NCS results, with \"no electrodiangostic evidence of nerve entrapement, lumbar radiculopathy and generalized peripheral neuropathy\".Pain  Current pain ratin             Objective          Active Range of Motion   Left Ankle/Foot   Dorsiflexion (ke): 10 degrees   Plantar flexion: 66 degrees   Inversion: 50 degrees   Eversion: 18 degrees     Strength/Myotome Testing     Left Ankle/Foot   Dorsiflexion: 4  Plantar flexion: 4  Inversion: 4  Eversion: 4    Ambulation     Comments   Decreased left stance phase and weight shift.          Assessment & Plan     Assessment  Impairments: abnormal coordination, abnormal gait, abnormal muscle firing, abnormal or restricted ROM, activity intolerance, impaired balance, impaired physical strength, lacks appropriate home exercise program, pain with function and weight-bearing intolerance  Functional Limitations: walking, uncomfortable because of pain, standing and unable to perform repetitive tasks  Assessment details: Outcome measures were obtained during today's treatment session with patient demonstrating improved left ankle ROM and strength. The patient reported "  (61.25% impaired) on the LEFS, which has improved from a 73% impairment reported on 23. The patient has achieved 2/3 STG and 1/4 LTG. He would benefit from continued skilled physical therapy to further address functional limitations and impairments.  Today's session included therapeutic exercises and activities for improved left lower extremity ROM, strength, and functional mobility. The session concluded with therapeutic ultrasound to the left foot/ankle, with no skin irritation observed. The patient reported 6/10 left ankle pain following today's session.  Prognosis: good    Goals  Plan Goals: STG 6 weeks    1. Pt will be instructed in a HEP.       Met  2. Pt will improve his ankle ROM by 10 degrees for both DF and PF.       Met  3.  Pt will report pain no greater 6/10.       Ongoin/10, per pt tolerance    LTG 12 weeks    1. Pt will improve his LEFs to less than 30%.        Progressin/80  2. Pt will demonstrate 4/5 gross left ankle stability.        Met  3. Pt will report pain no greater than 3/10 with increased walking.        Not Met: 10/10, per pt report  4. Pt will amb with with improved symmetry and velocity.        Ongoing    Plan  Therapy options: will be seen for skilled therapy services  Planned modality interventions: cryotherapy, thermotherapy (hydrocollator packs) and ultrasound  Planned therapy interventions: ADL retraining, balance/weight-bearing training, flexibility, functional ROM exercises, gait training, home exercise program, IADL retraining, joint mobilization, manual therapy, motor coordination training, neuromuscular re-education, soft tissue mobilization, strengthening, stretching, transfer training and therapeutic activities  Frequency: 1x week  Duration in weeks: 4  Treatment plan discussed with: patient  Plan details: Will follow for optimal gains.             Recommendations: Continue as planned  Timeframe: 1 month  Prognosis to achieve goals: good      Timed:          Manual Therapy:         mins  56158;     Therapeutic Exercise:    33     mins  12988;     Neuromuscular Jazmin:    15    mins  94790;    Therapeutic Activity:          mins  04962;     Gait Training:           mins  62659;     Ultrasound:    8      mins  82069;    Ionto                                   mins   71465  Self Care                            mins   79403  Canalith Repos         mins 46559      Un-Timed:  Electrical Stimulation:         mins  64223 ( );  Dry Needling          mins self-pay  Traction          mins 53780  Re-Eval                               mins  95291      Timed Treatment:   56   mins   Total Treatment:     56   mins          PT: Ashley Claudene Dalton, PT     [place] License:  [id number]    Electronically signed by Ashley Claudene Dalton, PT, 03/22/23, 1:40 PM EDT

## 2023-03-23 LAB — T3FREE SERPL-MCNC: 2.88 PG/ML (ref 2–4.4)

## 2023-03-28 NOTE — PROGRESS NOTES
Kidney and liver function are good.  His cholesterol has increased to 259 and his LDL has gone up.  Ask him if he is taking his cholesterol medicine.  His blood count is good his vitamin D and B12 are good.  His thyroid is okay.  Dilantin level is good his blood sugars are good.  His cholesterol medication is simvastatin 10 mg.

## 2023-03-29 ENCOUNTER — OFFICE VISIT (OUTPATIENT)
Dept: CARDIOLOGY | Facility: CLINIC | Age: 53
End: 2023-03-29
Payer: COMMERCIAL

## 2023-03-29 VITALS
DIASTOLIC BLOOD PRESSURE: 75 MMHG | WEIGHT: 236.2 LBS | RESPIRATION RATE: 16 BRPM | SYSTOLIC BLOOD PRESSURE: 117 MMHG | BODY MASS INDEX: 37.07 KG/M2 | HEART RATE: 89 BPM | OXYGEN SATURATION: 98 % | HEIGHT: 67 IN

## 2023-03-29 DIAGNOSIS — E78.2 MIXED HYPERLIPIDEMIA: ICD-10-CM

## 2023-03-29 DIAGNOSIS — I10 ESSENTIAL HYPERTENSION: Primary | ICD-10-CM

## 2023-03-29 RX ORDER — ROSUVASTATIN CALCIUM 40 MG/1
40 TABLET, COATED ORAL DAILY
Qty: 60 TABLET | Refills: 3 | Status: SHIPPED | OUTPATIENT
Start: 2023-03-29

## 2023-03-29 NOTE — PROGRESS NOTES
[facility]             BALDOMERO Wise Cynthia, APRN  Jaquan Mckay  1970 03/29/2023    Patient Active Problem List   Diagnosis   • Gastroesophageal reflux disease without esophagitis   • Essential hypertension   • Seizures (HCC)   • Hyperlipidemia   • Anxiety   • Varicocele present on ultrasound of scrotum   • Chronic bilateral low back pain with left-sided sciatica   • Seasonal allergic rhinitis due to pollen   • Mild persistent asthma without complication   • Precordial pain   • Congenital coronary artery anomaly   • BMI 35.0-35.9,adult   • Chondromalacia, knee   • Achilles tendinitis of both lower extremities   • Muscle spasm of both lower legs   • Personal history of allergy to shellfish   • Sensation of cold in lower extremity   • Varicose vein of leg   • Heart palpitations   • Generalized anxiety disorder   • Chronic elbow pain, right   • Unstable angina (HCC)   • ASCVD (arteriosclerotic cardiovascular disease)   • Other constipation   • Class 2 severe obesity due to excess calories with serious comorbidity and body mass index (BMI) of 36.0 to 36.9 in adult (HCC)   • Bacteremia   • Therapeutic opioid-induced constipation (OIC)   • Rectal bleeding   • Bloating   • History of colon polyps   • Lateral epicondylitis of right elbow   • Psychophysiological insomnia   • Chronic rhinitis   • Vitamin D deficiency   • Renal calculus   • Chronic idiopathic constipation   • Diarrhea   • Bilateral flank pain   • BPH without obstruction/lower urinary tract symptoms   • Incomplete bladder emptying       Tiera Arreaga APRN:    Subjective     Chief Complaint   Patient presents with   • Follow-up     6 mos    • Med Management     verbal       HPI:     This is a 52 y.o. male with known past medical history of PSVT, essential hypertension, history of seizures, dyslipidemia and chronic chest pain.    Jaquan Mckay presents today for routine cardiology  follow up.  Patient states he has been doing overall well since his last visit.  Denies any palpitations, chest pain or shortness of breath.  States he had recent lab work by his PCP and was told his cholesterol was high.  CMP showed stable kidney function.  TSH and hemoglobin A1c normal.  Lipid panel showed LDL of 179.  Blood pressure has been controlled.    • Diagnostic Testing  1. Left heart catheterization 9/2018: Minimal plaquing disease in the mid and distal segments of the LAD  2. Echocardiogram 9/2019: EF 56 to 60%  3. Carotid ultrasound 9/2019: No evidence of hemodynamically significant plaques or stenosis  4. VIJI 10/2019: No definitive evidence of vegetations with normal LV function  5. Echocardiogram 2/2022: EF 61 to 65%  6. Nuclear stress test 2/2022: No evidence of ischemia  7. Holter monitor 7/2022: Predominantly normal sinus rhythm with occasional PACs and a short 4 beat run of SVT     All other systems were reviewed and were negative.    Patient Active Problem List   Diagnosis   • Gastroesophageal reflux disease without esophagitis   • Essential hypertension   • Seizures (HCC)   • Hyperlipidemia   • Anxiety   • Varicocele present on ultrasound of scrotum   • Chronic bilateral low back pain with left-sided sciatica   • Seasonal allergic rhinitis due to pollen   • Mild persistent asthma without complication   • Precordial pain   • Congenital coronary artery anomaly   • BMI 35.0-35.9,adult   • Chondromalacia, knee   • Achilles tendinitis of both lower extremities   • Muscle spasm of both lower legs   • Personal history of allergy to shellfish   • Sensation of cold in lower extremity   • Varicose vein of leg   • Heart palpitations   • Generalized anxiety disorder   • Chronic elbow pain, right   • Unstable angina (HCC)   • ASCVD (arteriosclerotic cardiovascular disease)   • Other constipation   • Class 2 severe obesity due to excess calories with serious comorbidity and body mass index (BMI) of 36.0 to  "36.9 in adult ([facility])   • Bacteremia   • Therapeutic opioid-induced constipation (OIC)   • Rectal bleeding   • Bloating   • History of colon polyps   • Lateral epicondylitis of right elbow   • Psychophysiological insomnia   • Chronic rhinitis   • Vitamin D deficiency   • Renal calculus   • Chronic idiopathic constipation   • Diarrhea   • Bilateral flank pain   • BPH without obstruction/lower urinary tract symptoms   • Incomplete bladder emptying       family history includes Diabetes in his brother, father, and mother; Heart attack in his father; Heart disease in his maternal aunt, maternal grandfather, maternal grandmother, maternal uncle, paternal aunt, paternal grandfather, paternal grandmother, and paternal uncle; Hypertension in his brother, father, and mother; Skin cancer in his father; Stroke in his mother.     reports that he has been smoking cigars and cigarettes. He started smoking about a year ago. He has a 4.25 pack-year smoking history. He has never used smokeless tobacco. He reports that he does not drink alcohol and does not use drugs.    Allergies   Allergen Reactions   • Ciprofloxacin Anaphylaxis and Hives   • Miralax [Polyethylene Glycol] Itching and Rash   • Mobic [Meloxicam] Other (See Comments)     Pt states, \"It make my feet and hands go numb and I can't hardly walk.\"    • Paxil [Paroxetine Hcl] Shortness Of Breath     Chest pain    • Peanut-Containing Drug Products Anaphylaxis   • Penicillins Anaphylaxis   • Pristiq [Desvenlafaxine Succinate Er] Dizziness   • Sulfa Antibiotics Anaphylaxis, Itching and Rash   • Doxycycline Hives   • Fish-Derived Products Hives   • Isosorbide Nitrate Rash     Rash, hives, had to use inhaler.    • Movantik [Naloxegol] Rash   • Seroquel [Quetiapine] Hives and Rash   • Buspar [Buspirone] Rash   • Clarithromycin Rash   • Clindamycin/Lincomycin Rash   • Codeine Rash   • Contrast Dye (Echo Or Unknown Ct/Mr) Itching and Rash   • Diltiazem Rash   • Flomax [Tamsulosin] " Hives   • Gabapentin Rash   • Iodinated Contrast Media Itching and Rash   • Keflex [Cephalexin] Rash   • Linzess [Linaclotide] Rash   • Metoprolol Rash   • Prednisone Rash and Other (See Comments)     Face, feet, and legs go completely numb per patient   • Robitussin Cough+ Chest Max St [Dextromethorphan-Guaifenesin] Itching   • Shrimp (Diagnostic) Rash   • Spironolactone Rash   • Viibryd [Vilazodone Hcl] Itching and Rash   • Zoloft [Sertraline Hcl] Hives and Itching         Current Outpatient Medications:   •  acetaminophen (TYLENOL) 500 MG tablet, Take 1 tablet by mouth Every 6 (Six) Hours As Needed for Mild Pain., Disp: 30 tablet, Rfl: 2  •  albuterol sulfate  (90 Base) MCG/ACT inhaler, , Disp: , Rfl:   •  aspirin (aspirin) 81 MG EC tablet, Take 1 tablet by mouth Daily., Disp: 30 tablet, Rfl: 5  •  azelastine (ASTELIN) 0.1 % nasal spray, , Disp: , Rfl:   •  cholestyramine (QUESTRAN) 4 GM/DOSE powder, Take 1 packet by mouth 2 (Two) Times a Day. mixed with a liquid, Disp: 378 g, Rfl: 3  •  clobetasol (TEMOVATE) 0.05 % ointment, Apply 1 application topically to the appropriate area as directed 2 (Two) Times a Day., Disp: 60 g, Rfl: 2  •  cyclobenzaprine (FLEXERIL) 5 MG tablet, TAKE 1-2 TABLETS BY MOUTH 3 (THREE) TIMES A DAY AS NEEDED FOR MUSCLE SPASMS., Disp: 120 tablet, Rfl: 2  •  dexlansoprazole (Dexilant) 60 MG capsule, Take 1 capsule by mouth 2 (Two) Times a Day., Disp: 60 capsule, Rfl: 0  •  dicyclomine (Bentyl) 10 MG capsule, Take 1 capsule by mouth 4 (Four) Times a Day Before Meals & at Bedtime., Disp: 120 capsule, Rfl: 11  •  Dilantin 100 MG capsule, Take 2 capsules by mouth 2 (Two) Times a Day., Disp: 120 capsule, Rfl: 5  •  diphenhydrAMINE (Benadryl Allergy) 25 MG tablet, Take 1-2 tablets by mouth Every 8 (Eight) Hours As Needed for Itching (or rash)., Disp: 120 tablet, Rfl: 2  •  Elastic Bandages & Supports (ACE Ankle Brace) misc, 1 each Daily., Disp: 1 each, Rfl: 0  •  EPINEPHrine (EPIPEN JR) 0.15  MG/0.3ML solution auto-injector injection, Inject 0.15 mg into the appropriate muscle as directed by prescriber., Disp: , Rfl:   •  famotidine (Pepcid) 40 MG tablet, Take 1 tablet by mouth 2 (Two) Times a Day., Disp: 60 tablet, Rfl: 5  •  fluticasone (FLONASE) 50 MCG/ACT nasal spray, , Disp: , Rfl:   •  fluticasone (Flovent HFA) 110 MCG/ACT inhaler, Inhale 1 puff 2 (Two) Times a Day., Disp: 12 g, Rfl: 2  •  HYDROcodone-acetaminophen (Norco) 7.5-325 MG per tablet, Take 1 tablet by mouth Every 6 (Six) Hours As Needed for Moderate Pain., Disp: 12 tablet, Rfl: 0  •  ketorolac (TORADOL) 10 MG tablet, Take 1 tablet by mouth Every 6 (Six) Hours As Needed for Moderate Pain., Disp: 20 tablet, Rfl: 0  •  levocetirizine (XYZAL) 5 MG tablet, , Disp: , Rfl:   •  lisinopril (PRINIVIL,ZESTRIL) 30 MG tablet, TAKE ONE TABLET BY MOUTH DAILY FOR BLOOD PRESSURE, Disp: 30 tablet, Rfl: 5  •  loratadine (CLARITIN) 10 MG tablet, TAKE 1 TABLET BY MOUTH DAILY AS NEEDED FOR ALLERGIES., Disp: 30 tablet, Rfl: 5  •  methocarbamol (ROBAXIN) 750 MG tablet, , Disp: , Rfl:   •  mirtazapine (REMERON) 30 MG tablet, Take 1.5 tablets by mouth Every Night., Disp: 45 tablet, Rfl: 5  •  mometasone (NASONEX) 50 MCG/ACT nasal spray, , Disp: , Rfl:   •  montelukast (SINGULAIR) 10 MG tablet, Take 1 tablet by mouth., Disp: , Rfl:   •  mupirocin (BACTROBAN) 2 % ointment, Apply  topically to the appropriate area as directed 3 (Three) Times a Day., Disp: 30 g, Rfl: 2  •  phenazopyridine (Pyridium) 200 MG tablet, Take 1 tablet by mouth 3 (Three) Times a Day As Needed for Bladder Spasms., Disp: 20 tablet, Rfl: 0  •  potassium chloride (KAYCIEL) 20 mEq/15 mL solution, Take 7.5 mL by mouth Daily., Disp: 225 mL, Rfl: 3  •  tamsulosin (FLOMAX) 0.4 MG capsule 24 hr capsule, TAKE 1 CAPSULE BY MOUTH DAILY., Disp: 30 capsule, Rfl: 5  •  temazepam (Restoril) 30 MG capsule, Take 1 capsule by mouth At Night As Needed for Sleep., Disp: 30 capsule, Rfl: 2  •  vitamin D  (ERGOCALCIFEROL) 1.25 MG (39271 UT) capsule capsule, Take 1 capsule by mouth Every 7 (Seven) Days., Disp: 4 capsule, Rfl: 5  •  rosuvastatin (CRESTOR) 40 MG tablet, Take 1 tablet by mouth Daily., Disp: 60 tablet, Rfl: 3      Physical Exam:  I have reviewed the patient's current vital signs as documented in the patient's EMR.   Vitals:    03/29/23 1349   BP: 117/75   Pulse: 89   Resp: 16   SpO2: 98%     Body mass index is 36.99 kg/m².       03/29/23  1349   Weight: 107 kg (236 lb 3.2 oz)      Physical Exam  Constitutional:       General: He is not in acute distress.     Appearance: Normal appearance. He is well-developed.   HENT:      Head: Normocephalic and atraumatic.      Mouth/Throat:      Mouth: Mucous membranes are moist.   Eyes:      Extraocular Movements: Extraocular movements intact.      Pupils: Pupils are equal, round, and reactive to light.   Neck:      Vascular: No JVD.   Cardiovascular:      Rate and Rhythm: Normal rate and regular rhythm.      Heart sounds: Normal heart sounds. No murmur heard.    No S3 or S4 sounds.   Pulmonary:      Effort: Pulmonary effort is normal. No respiratory distress.      Breath sounds: Normal breath sounds. No wheezing.   Abdominal:      General: Bowel sounds are normal. There is no distension.      Palpations: Abdomen is soft. There is no hepatomegaly.      Tenderness: There is no abdominal tenderness.   Musculoskeletal:         General: Normal range of motion.      Cervical back: Normal range of motion and neck supple.   Skin:     General: Skin is warm and dry.      Coloration: Skin is not jaundiced or pale.   Neurological:      General: No focal deficit present.      Mental Status: He is alert and oriented to person, place, and time. Mental status is at baseline.   Psychiatric:         Mood and Affect: Mood normal.         Behavior: Behavior normal.         Thought Content: Thought content normal.         Judgment: Judgment normal.       DATA REVIEWED:      TTE/VIJI:  Results for orders placed during the hospital encounter of 02/23/22    Adult Transthoracic Echo Complete W/ Cont if Necessary Per Protocol    Interpretation Summary  · Normal left ventricular cavity size and wall thickness noted. All left ventricular wall segments contract normally.  · Left ventricular ejection fraction appears to be 61 - 65%.  · The mitral valve is structurally normal with no regurgitation or significant stenosis present.  · The aortic valve is not well visualized. The aortic valve is abnormal in structure. The aortic valve exhibits sclerosis. There is mild calcification of the aortic valve. No significant aortic valve regurgitation is present. No hemodynamically significant aortic valve stenosis is present.  · There is no evidence of pericardial effusion.  · Comments: May consider VIJI study to have a better look at the aortic valve if clinically warrented.      Laboratory evaluations:    Lab Results   Component Value Date    GLUCOSE 102 (H) 03/22/2023    BUN 13 03/22/2023    CREATININE 1.01 03/22/2023    EGFRIFNONA 66 02/02/2022    EGFRIFAFRI  08/26/2016      Comment:      <15 Indicative of kidney failure.    BCR 12.9 03/22/2023    K 4.5 03/22/2023    CO2 25.8 03/22/2023    CALCIUM 9.4 03/22/2023    ALBUMIN 4.2 03/22/2023    LABIL2 1.1 (L) 05/29/2016    AST 27 03/22/2023    ALT 18 03/22/2023     Lab Results   Component Value Date    WBC 5.70 03/22/2023    HGB 16.6 03/22/2023    HCT 48.0 03/22/2023    MCV 95.8 03/22/2023     03/22/2023     Lab Results   Component Value Date    CHOL 259 (H) 03/22/2023    CHLPL 232 (H) 04/05/2016    TRIG 62 03/22/2023    HDL 70 (H) 03/22/2023     (H) 03/22/2023     Lab Results   Component Value Date    TSH 2.070 03/22/2023    P3NRKMN 5.1 04/05/2016     Lab Results   Component Value Date    HGBA1C 5.00 03/22/2023     Lab Results   Component Value Date    ALT 18 03/22/2023     Lab Results   Component Value Date    HGBA1C 5.00 03/22/2023     HGBA1C 5.00 05/05/2022    HGBA1C 5.63 (H) 02/02/2022     Lab Results   Component Value Date    CREATININE 1.01 03/22/2023     Lab Results   Component Value Date    FERRITIN 371.90 10/09/2021     Lab Results   Component Value Date    INR 0.98 09/24/2019    INR 1.03 02/06/2018    INR 0.97 09/26/2017    PROTIME 13.5 09/24/2019    PROTIME 13.6 02/06/2018    PROTIME 13.0 09/26/2017             ECG 12 Lead    Date/Time: 3/29/2023 2:59 PM  Performed by: Kathy Krause APRN  Authorized by: Kathy Krause APRN   Comparison: compared with previous ECG   Rhythm: sinus rhythm    Clinical impression: non-specific ECG          --------------------------------------------------------------------------------------------------------------------------    ASSESSMENT/PLAN:      Diagnosis Plan   1. Essential hypertension        2. Mixed hyperlipidemia  rosuvastatin (CRESTOR) 40 MG tablet          1. Essential hypertension  • Blood pressure controlled.  Recent CMP showed normal kidney function.  Continue with lisinopril.    2. Hyperlipidemia  • Recent lab panel showed .  Patient currently taking Zocor 10 mg and states he has been on this for many years.  We will change this to Crestor 40 mg and recheck lipid panel in 3 months.  If LDL remains elevated can consider PCSK9.    • Instructed him on stricter diet control.      This document has been @Electronically signed by BALDOMERO Wise, 03/29/23, 12:01 PM EDT.       Dictated Utilizing Dragon Dictation: Part of this note may be an electronic transcription/translation of spoken language to printed text using the Dragon Dictation System.    Follow-up appointment and medication changes provided in hand delivered After Visit Summary as well as reviewed in the room.

## 2023-04-04 ENCOUNTER — TREATMENT (OUTPATIENT)
Dept: PHYSICAL THERAPY | Facility: CLINIC | Age: 53
End: 2023-04-04
Payer: COMMERCIAL

## 2023-04-04 DIAGNOSIS — S86.312A TEAR OF PERONEAL TENDON OF LEFT FOOT: ICD-10-CM

## 2023-04-04 DIAGNOSIS — R26.89 ANTALGIC GAIT: ICD-10-CM

## 2023-04-04 DIAGNOSIS — M25.572 LEFT ANKLE PAIN, UNSPECIFIED CHRONICITY: Primary | ICD-10-CM

## 2023-04-04 PROCEDURE — 97530 THERAPEUTIC ACTIVITIES: CPT | Performed by: PHYSICAL THERAPIST

## 2023-04-04 PROCEDURE — 97035 APP MDLTY 1+ULTRASOUND EA 15: CPT | Performed by: PHYSICAL THERAPIST

## 2023-04-04 PROCEDURE — 97110 THERAPEUTIC EXERCISES: CPT | Performed by: PHYSICAL THERAPIST

## 2023-04-04 NOTE — PROGRESS NOTES
Physical Therapy Daily Treatment Note      Patient: Jaquan Mckay   : 1970  Referring practitioner: Char Xiao DPM  Date of Initial Visit: Type: THERAPY  Noted: 2023  Today's Date: 2023  Patient seen for 18 sessions       Visit Diagnoses:    ICD-10-CM ICD-9-CM   1. Left ankle pain, unspecified chronicity  M25.572 719.47   2. Tear of peroneal tendon of left foot  S86.312A 844.8   3. Antalgic gait  R26.89 781.2       Subjective Evaluation    History of Present Illness    Subjective comment: Patient reports 8/10 pain today.  He notes that he will see surgeon in May and will have more scans performed.       Objective   See Exercise, Manual, and Modality Logs for complete treatment.       Assessment & Plan     Assessment    Assessment details: Patient tolerated today's session well, noting a decrease in pain at conclusion of session.  Treatment session initiated with there ex and therapeutic activities per flow sheet, followed by US and cryotherapy.  No adverse reactions were noted with modalities.  Exercises focused on ankle ROM, ankle strength, LE strengthening, and functional activities.  He noted 5/10 pain post-tx.  He will continue to be progressed per his tolerance and POC.          Timed:         Manual Therapy:         mins  17972;     Therapeutic Exercise:    34     mins  35370;     Neuromuscular Jazmin:        mins  42971;    Therapeutic Activity:     12     mins  55226;     Gait Training:           mins  85585;     Ultrasound:    8      mins  64372;    Ionto                                   mins   40796  Self Care                            mins   17806  Canalith Repos         mins 47492      Un-Timed:  Electrical Stimulation:         mins  17229 ( );  Dry Needling          mins self-pay  Traction          mins 71451  Ice-5 min    Timed Treatment:   54  mins   Total Treatment:     59   mins    Hanna Burks, PT  [place] License: [id number]  Electronically signed by Hanna Burks,  PT, 04/04/23, 5:12 PM EDT.

## 2023-04-05 ENCOUNTER — TREATMENT (OUTPATIENT)
Dept: PHYSICAL THERAPY | Facility: CLINIC | Age: 53
End: 2023-04-05
Payer: COMMERCIAL

## 2023-04-05 DIAGNOSIS — M25.572 LEFT ANKLE PAIN, UNSPECIFIED CHRONICITY: Primary | ICD-10-CM

## 2023-04-05 DIAGNOSIS — S86.312A TEAR OF PERONEAL TENDON OF LEFT FOOT: ICD-10-CM

## 2023-04-05 DIAGNOSIS — R26.89 ANTALGIC GAIT: ICD-10-CM

## 2023-04-05 PROCEDURE — 97035 APP MDLTY 1+ULTRASOUND EA 15: CPT | Performed by: PHYSICAL THERAPIST

## 2023-04-05 PROCEDURE — 97110 THERAPEUTIC EXERCISES: CPT | Performed by: PHYSICAL THERAPIST

## 2023-04-05 PROCEDURE — 97530 THERAPEUTIC ACTIVITIES: CPT | Performed by: PHYSICAL THERAPIST

## 2023-04-05 NOTE — PROGRESS NOTES
3Physical Therapy Daily Treatment Note      Patient: Jaquan Mckay   : 1970  Referring practitioner: Char Xiao DPM  Date of Initial Visit: Type: THERAPY  Noted: 2023  Today's Date: 2023  Patient seen for 19 sessions       Visit Diagnoses:    ICD-10-CM ICD-9-CM   1. Left ankle pain, unspecified chronicity  M25.572 719.47   2. Tear of peroneal tendon of left foot  S86.312A 844.8   3. Antalgic gait  R26.89 781.2       Subjective Evaluation    History of Present Illness    Subjective comment: Patient states that his ankle feels tight today.  He also notes that he feels like the ice has been making his pain worse and requests to use heat instead.Pain  Current pain ratin           Objective   See Exercise, Manual, and Modality Logs for complete treatment.       Assessment & Plan     Assessment    Assessment details: Therapy session initiated with there ex and therapeutic activities, followed by pulsed US and MH.  Patient noted mild discomfort with standing exercises, but stated that it was tolerable.  Soleus stretch added at today's session to encourage decreased muscle tightness.  Patient reported a slight decrease in pain to 6/10 post-tx.  He will continue to be progressed per his tolerance and POC.          Timed:         Manual Therapy:         mins  73437;     Therapeutic Exercise:    35     mins  16974;     Neuromuscular Jazmin:        mins  87047;    Therapeutic Activity:     12     mins  55888;     Gait Training:           mins  43756;     Ultrasound:     8     mins  04914;    Ionto                                   mins   59240  Self Care                            mins   58273  Canalith Repos         mins 69229      Un-Timed:  Electrical Stimulation:         mins  24561 ( );  Dry Needling          mins self-pay  Traction          mins 87024  Moist Heat-5 min    Timed Treatment:   55   mins   Total Treatment:     60   mins    Hanna Burks, PT  [place] License:  [id number]  Electronically signed by Hanna Burks, PT, 04/05/23, 12:23 PM EDT.

## 2023-04-11 ENCOUNTER — TREATMENT (OUTPATIENT)
Dept: PHYSICAL THERAPY | Facility: CLINIC | Age: 53
End: 2023-04-11
Payer: COMMERCIAL

## 2023-04-11 DIAGNOSIS — M25.572 LEFT ANKLE PAIN, UNSPECIFIED CHRONICITY: Primary | ICD-10-CM

## 2023-04-11 DIAGNOSIS — R26.89 ANTALGIC GAIT: ICD-10-CM

## 2023-04-11 DIAGNOSIS — S86.312A TEAR OF PERONEAL TENDON OF LEFT FOOT: ICD-10-CM

## 2023-04-11 PROCEDURE — 97110 THERAPEUTIC EXERCISES: CPT | Performed by: PHYSICAL THERAPIST

## 2023-04-11 PROCEDURE — 97530 THERAPEUTIC ACTIVITIES: CPT | Performed by: PHYSICAL THERAPIST

## 2023-04-11 PROCEDURE — 97035 APP MDLTY 1+ULTRASOUND EA 15: CPT | Performed by: PHYSICAL THERAPIST

## 2023-04-11 NOTE — PROGRESS NOTES
Physical Therapy Daily Treatment Note      Patient: Jaquan Mckay   : 1970  Referring practitioner: Char Xiao DPM  Date of Initial Visit: Type: THERAPY  Noted: 2023  Today's Date: 2023  Patient seen for 20 sessions       Visit Diagnoses:    ICD-10-CM ICD-9-CM   1. Left ankle pain, unspecified chronicity  M25.572 719.47   2. Tear of peroneal tendon of left foot  S86.312A 844.8   3. Antalgic gait  R26.89 781.2       Subjective   Patient reports that he is having 9/10 pain in his left ankle. Patient states that he has trouble with putting weight on his ankle due to the pain in his ankle. Patient reports that he has follow up 23. Patient states that he is having increased cramping and spasms in his left ankle.    Objective   See Exercise, Manual, and Modality Logs for complete treatment.       Assessment/Plan  Patient tolerated treatment session well with rest breaks taken as needed by the patient. Educated patient to perform therex per his tolerance, patient verbalized understanding. No adverse reactions with modalities or treatment session. Treatment session consisted of exercises to improve strength and ROM to the left ankle. Secondary to patient's increased pain during today's session treatment session kept the same. Yellow theraband used during today's session due to patient's increased pain when using red theraband.  7/10 pain noted following treatment session. Continue per PT's POC, progress exercises per the patient's tolerance.    Timed:         Manual Therapy:         mins  81148;     Therapeutic Exercise:    35     mins  89359;     Neuromuscular Jazmin:        mins  11264;    Therapeutic Activity:     12     mins  30614;     Gait Training:           mins  58678;     Ultrasound:     8     mins  62359;    Ionto                                   mins   18963  Self Care                            mins   63978  Canalith Repos         mins 58952      Un-Timed:  Electrical Stimulation:          mins  98714 ( );  Dry Needling          mins self-pay  Traction          mins 69791      Timed Treatment:   55   mins   Total Treatment:     61   mins    Radha Martin, PTA  [place] License: [id number]

## 2023-04-12 ENCOUNTER — OFFICE VISIT (OUTPATIENT)
Dept: FAMILY MEDICINE CLINIC | Facility: CLINIC | Age: 53
End: 2023-04-12
Payer: COMMERCIAL

## 2023-04-12 ENCOUNTER — TREATMENT (OUTPATIENT)
Dept: PHYSICAL THERAPY | Facility: CLINIC | Age: 53
End: 2023-04-12
Payer: COMMERCIAL

## 2023-04-12 VITALS
TEMPERATURE: 98.4 F | HEIGHT: 67 IN | SYSTOLIC BLOOD PRESSURE: 116 MMHG | WEIGHT: 230 LBS | OXYGEN SATURATION: 97 % | HEART RATE: 80 BPM | RESPIRATION RATE: 16 BRPM | BODY MASS INDEX: 36.1 KG/M2 | DIASTOLIC BLOOD PRESSURE: 78 MMHG

## 2023-04-12 DIAGNOSIS — R56.9 SEIZURES: ICD-10-CM

## 2023-04-12 DIAGNOSIS — M79.672 LEFT FOOT PAIN: ICD-10-CM
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
## 2023-04-12 DIAGNOSIS — I10 ESSENTIAL HYPERTENSION: ICD-10-CM

## 2023-04-12 DIAGNOSIS — G47.33 OBSTRUCTIVE SLEEP APNEA HYPOPNEA, MODERATE: Primary | ICD-10-CM

## 2023-04-12 DIAGNOSIS — R26.89 ANTALGIC GAIT: ICD-10-CM

## 2023-04-12 DIAGNOSIS — K21.9 GASTROESOPHAGEAL REFLUX DISEASE WITHOUT ESOPHAGITIS: ICD-10-CM

## 2023-04-12 DIAGNOSIS — M25.572 LEFT ANKLE PAIN, UNSPECIFIED CHRONICITY: Primary | ICD-10-CM

## 2023-04-12 DIAGNOSIS — S86.312A TEAR OF PERONEAL TENDON OF LEFT FOOT: ICD-10-CM

## 2023-04-12 DIAGNOSIS — F51.04 PSYCHOPHYSIOLOGICAL INSOMNIA: ICD-10-CM

## 2023-04-12 PROCEDURE — 97530 THERAPEUTIC ACTIVITIES: CPT | Performed by: PHYSICAL THERAPIST

## 2023-04-12 PROCEDURE — 97110 THERAPEUTIC EXERCISES: CPT | Performed by: PHYSICAL THERAPIST

## 2023-04-12 RX ORDER — PHENYTOIN SODIUM 100 MG/1
200 CAPSULE, EXTENDED RELEASE ORAL 2 TIMES DAILY
Qty: 120 CAPSULE | Refills: 5 | Status: SHIPPED | OUTPATIENT
Start: 2023-04-12

## 2023-04-12 RX ORDER — DEXLANSOPRAZOLE 60 MG/1
60 CAPSULE, DELAYED RELEASE ORAL 2 TIMES DAILY
Qty: 60 CAPSULE | Refills: 5 | Status: SHIPPED | OUTPATIENT
Start: 2023-04-12

## 2023-04-12 RX ORDER — TEMAZEPAM 30 MG/1
30 CAPSULE ORAL NIGHTLY PRN
Qty: 30 CAPSULE | Refills: 2 | Status: SHIPPED | OUTPATIENT
Start: 2023-04-12

## 2023-04-12 RX ORDER — HYDROCODONE BITARTRATE AND ACETAMINOPHEN 7.5; 325 MG/1; MG/1
1 TABLET ORAL EVERY 6 HOURS PRN
Qty: 12 TABLET | Refills: 0 | Status: SHIPPED | OUTPATIENT
Start: 2023-04-12

## 2023-04-12 RX ORDER — MIRTAZAPINE 30 MG/1
45 TABLET, FILM COATED ORAL NIGHTLY
Qty: 45 TABLET | Refills: 5 | Status: SHIPPED | OUTPATIENT
Start: 2023-04-12

## 2023-04-12 NOTE — PROGRESS NOTES
Physical Therapy Daily Treatment Note      Patient: Jaquan Mckay   : 1970  Referring practitioner: Char Xiao DPM  Date of Initial Visit: Type: THERAPY  Noted: 2023  Today's Date: 2023  Patient seen for 21 sessions       Visit Diagnoses:    ICD-10-CM ICD-9-CM   1. Left ankle pain, unspecified chronicity  M25.572 719.47   2. Tear of peroneal tendon of left foot  S86.312A 844.8   3. Antalgic gait  R26.89 781.2       Subjective Evaluation    History of Present Illness    Subjective comment: Pt reports having 9/10 ankle pain today.       Objective   See Exercise, Manual, and Modality Logs for complete treatment.       Assessment & Plan     Assessment    Assessment details: Tx today initiated with mh to ankle; followed by exercises for improved mobility and stablity; standing functional activities and there x and ended with ice.  Pt noted to have increased left gastroc pain with conservative gastroc stretch.  Pt reports he is going to ask for more medicine for his pain.  Pt demonstrated good effort with standing exercises with frequent rest breaks.    Plan  Plan details: Will follow for improved ankle stability and function.          Timed:         Manual Therapy:         mins  34239;     Therapeutic Exercise:    31     mins  30174;     Neuromuscular Jazmin:        mins  34740;    Therapeutic Activity:     11     mins  58780;     Gait Training:           mins  55833;     Ultrasound:          mins  54935;    Ionto                                   mins   70699  Self Care                            mins   78612  Canalith Repos         mins 47345      Un-Timed:  Electrical Stimulation:         mins  32463 (MC );  Dry Needling          mins self-pay  Traction          mins 03536      Timed Treatment:   42   mins   Total Treatment:     53   Mins(5 min mh and 6 min ice)    Win Rodriguez PT  [place] License: [id number]      Electronically signed by Win Rodriguez PT, 23, 10:05 AM EDT

## 2023-04-12 NOTE — PROGRESS NOTES
"Subjective   Jaquan Mckay is a 52 y.o. male.     Chief Complaint   Patient presents with   • Hypertension       History of Present Illness     HTN-ongoing.  He is followed by cardiology.  He is currently taking lisinopril 30 mg.  No negative side effects.  He denies any recent chest pain.  No palpitations.  PT for foot pain-reports he had PT this morning.   He reports it \"did not go good\".  He reports that it feels like it is going to Give away.  He has noted cramps \"really bad\".  The cramps radiate up the back of his leg over his achillis.  He reports his pain level \"was at a \"9\".  He will follow up with podiatry \"may 18\".  He was advised if needed to start wearing his walking boot again otherwise he is to be in the brace.  He has one PT session left.    Safety concern-reports his tub front is very tall at his apartment and he would like a letter for the landlord to submit to see if he can get replacement.    GERD-reports he did not get Dexilant.  Needs PA.  Famotidine is not helping and he ask for it to be discontinued.  He has intermittent reflux symptoms based on food intake.  Recent oxygen study-patient has history of PRANAY but has not been able to tolerate CPAP.  His oxygen study indicates that he needs nocturnal oxygen.  It will be prescribed.  Discussed with patient how he will use the oxygen at home at nighttime for sleep.  Seizure disorder-continues on Dilantin.  He does not think he has had any big seizures but questionably has had some small seizures.  He is taking Dilantin as directed.  Last Dilantin level was within normal limits.    The following portions of the patient's history were reviewed and updated as appropriate: CC, ROS, allergies, current medications, past family history, past medical history, past social history, past surgical history and problem list.      Review of Systems   Constitutional: Positive for fatigue. Negative for activity change, appetite change and fever.   HENT: Negative for " "congestion, ear pain, facial swelling, nosebleeds, rhinorrhea, sinus pressure, sore throat and trouble swallowing.    Eyes: Negative for blurred vision, double vision and redness.   Respiratory: Positive for shortness of breath. Negative for cough, chest tightness and wheezing.    Cardiovascular: Negative for chest pain, palpitations and leg swelling.   Gastrointestinal: Negative for abdominal pain, blood in stool, constipation, diarrhea, nausea and vomiting.   Endocrine: Negative.    Genitourinary: Negative for dysuria, flank pain, frequency, hematuria and urgency.   Musculoskeletal: Positive for arthralgias, back pain, gait problem and myalgias.   Skin: Negative.    Allergic/Immunologic: Negative.    Neurological: Negative for dizziness, weakness, light-headedness and headache.   Hematological: Negative.    Psychiatric/Behavioral: Positive for dysphoric mood, sleep disturbance and stress. Negative for self-injury and suicidal ideas. The patient is not nervous/anxious.    All other systems reviewed and are negative.      Objective     /78   Pulse 80   Temp 98.4 °F (36.9 °C)   Resp 16   Ht 170.2 cm (67.01\")   Wt 104 kg (230 lb)   SpO2 97%   BMI 36.01 kg/m²     Physical Exam  Vitals reviewed.   Constitutional:       General: He is not in acute distress.     Appearance: He is well-developed. He is obese. He is not diaphoretic.   HENT:      Head: Normocephalic and atraumatic.      Jaw: No tenderness.      Comments: Oropharynx not examined.  Patient is presently wearing a face covering/mask due to COVID-19 pandemic.     Right Ear: Hearing, tympanic membrane, ear canal and external ear normal.      Left Ear: Hearing, tympanic membrane, ear canal and external ear normal.   Eyes:      General: Lids are normal. No scleral icterus.     Extraocular Movements:      Right eye: Normal extraocular motion and no nystagmus.      Left eye: Normal extraocular motion and no nystagmus.      Conjunctiva/sclera: Conjunctivae " normal.      Pupils: Pupils are equal, round, and reactive to light.   Neck:      Thyroid: No thyromegaly or thyroid tenderness.      Vascular: No carotid bruit or JVD.      Trachea: No tracheal tenderness.   Cardiovascular:      Rate and Rhythm: Normal rate and regular rhythm.      Pulses:           Dorsalis pedis pulses are 2+ on the right side and 2+ on the left side.        Posterior tibial pulses are 2+ on the right side and 2+ on the left side.      Heart sounds: Normal heart sounds, S1 normal and S2 normal. No murmur heard.  Pulmonary:      Effort: Pulmonary effort is normal. No accessory muscle usage, prolonged expiration or respiratory distress.      Breath sounds: Normal breath sounds.   Chest:      Chest wall: No tenderness.   Abdominal:      General: Bowel sounds are normal. There is no distension.      Palpations: Abdomen is soft. There is no hepatomegaly, splenomegaly or mass.      Tenderness: There is no abdominal tenderness.   Musculoskeletal:         General: Tenderness present.      Cervical back: Normal range of motion and neck supple.      Thoracic back: Tenderness present.      Lumbar back: Tenderness present.      Right lower leg: No edema.      Left lower leg: Tenderness present. No edema.      Comments: No muscular atrophy or flaccidity.  Multiple varicosities to BLE   Lymphadenopathy:      Head:      Right side of head: No submental or submandibular adenopathy.      Left side of head: No submental or submandibular adenopathy.      Cervical: No cervical adenopathy.      Right cervical: No superficial cervical adenopathy.     Left cervical: No superficial cervical adenopathy.   Skin:     General: Skin is warm and dry.      Capillary Refill: Capillary refill takes less than 2 seconds.      Coloration: Skin is not jaundiced or pale.      Findings: No erythema.      Nails: There is no clubbing.   Neurological:      Mental Status: He is alert and oriented to person, place, and time.      Cranial  Nerves: No cranial nerve deficit or facial asymmetry.      Sensory: No sensory deficit.      Motor: No weakness, tremor, atrophy or abnormal muscle tone.      Coordination: Coordination normal.      Gait: Gait abnormal (antalgic).      Deep Tendon Reflexes: Reflexes are normal and symmetric.   Psychiatric:         Attention and Perception: He is attentive.         Mood and Affect: Mood is anxious and depressed.         Speech: Speech normal.         Behavior: Behavior normal. Behavior is cooperative.         Thought Content: Thought content normal.         Cognition and Memory: Memory is impaired.         Judgment: Judgment normal.           Diagnoses and all orders for this visit:    1. Obstructive sleep apnea hypopnea, moderate (Primary)  Comments:  Oxygen ordered for nocturnal use.  Patient to report any concerns    2. Psychophysiological insomnia  Comments:  Continue mirtazapine 45 mg.  continue Restoril to 30 mg.  Patient to report any negative side effects.  Continue under the [facility] for mental health support  Overview:  With depression and anxiety      Orders:  -     temazepam (Restoril) 30 MG capsule; Take 1 capsule by mouth At Night As Needed for Sleep.  Dispense: 30 capsule; Refill: 2  -     mirtazapine (REMERON) 30 MG tablet; Take 1.5 tablets by mouth Every Night.  Dispense: 45 tablet; Refill: 5    3. Gastroesophageal reflux disease without esophagitis  Comments:  Continue Dexilant 60 mg and famotidine 40 mg twice daily.  Avoid known food triggers  Orders:  -     dexlansoprazole (Dexilant) 60 MG capsule; Take 1 capsule by mouth 2 (Two) Times a Day.  Dispense: 60 capsule; Refill: 5    4. Seizures  Comments:  No known recent activity.  Continue Dilantin.  We will plan for an updated phenytoin level  Orders:  -     Dilantin 100 MG capsule; Take 2 capsules by mouth 2 (Two) Times a Day.  Dispense: 120 capsule; Refill: 5    5. Fall, subsequent encounter  -     HYDROcodone-acetaminophen (Norco) 7.5-325 MG per  tablet; Take 1 tablet by mouth Every 6 (Six) Hours As Needed for Moderate Pain.  Dispense: 12 tablet; Refill: 0    6. Left foot pain  Comments:  Continue under the care of orthopedic podiatry  Orders:  -     HYDROcodone-acetaminophen (Norco) 7.5-325 MG per tablet; Take 1 tablet by mouth Every 6 (Six) Hours As Needed for Moderate Pain.  Dispense: 12 tablet; Refill: 0    7. Essential hypertension  Assessment & Plan:  Continue lisinopril 30 mg.  Continue under the care of cardiology.  Ambulatory BP monitoring either at home or random community checks.  Patient to report continued elevations >140/90.  Patient may come by office for checks if needed.          Understands disease processes and need for medications.  Understands reasons for urgent and emergent care.  Patient (& family) verbalized agreement for treatment plan.   Emotional support and active listening provided.  Patient provided time to verbalize feelings.    Continue under the care of cardiology.    Reviewed overnight oxygen study with patient.    RTC 1 month, sooner if needed for problems or concerns          This document has been electronically signed by:  BALDOMERO Thao FNP-C Dragon disclaimer:  Part of this note may be an electronic transcription/translation of spoken language to printed text using the Dragon Dictation System.

## 2023-04-12 NOTE — LETTER
April 12, 2023    Jaquan Mckay  [address]  [place] [address]    To Whom It May Concern:     Jaquan is having increasing difficulty with mobility due to his recurrent ankle injury and pain.  He  is a high fall risk.  He needs to be able to have a low profile bathtub/Shower for easy access.  Please assess this patient's bathroom for replacement or exchanges for this concern.      Sincerely,           BALDOMERO Thao

## 2023-04-14 ENCOUNTER — TREATMENT (OUTPATIENT)
Dept: PHYSICAL THERAPY | Facility: CLINIC | Age: 53
End: 2023-04-14
Payer: COMMERCIAL

## 2023-04-14 DIAGNOSIS — M25.572 LEFT ANKLE PAIN, UNSPECIFIED CHRONICITY: Primary | ICD-10-CM

## 2023-04-14 DIAGNOSIS — S86.312A TEAR OF PERONEAL TENDON OF LEFT FOOT: ICD-10-CM

## 2023-04-14 DIAGNOSIS — R26.89 ANTALGIC GAIT: ICD-10-CM

## 2023-04-14 NOTE — PROGRESS NOTES
Physical Therapy Treatment/Discharge  Patient: Jaquan Mckay   : 1970  Diagnosis/ICD-10 Code:  Left ankle pain, unspecified chronicity [M25.572]  Referring practitioner: Char Xiao DPM  Date of Initial Visit: Type: THERAPY  Noted: 2023  Today's Date: 2023  Patient seen for 22 sessions         Visit Diagnoses:    ICD-10-CM ICD-9-CM   1. Left ankle pain, unspecified chronicity  M25.572 719.47   2. Tear of peroneal tendon of left foot  S86.312A 844.8   3. Antalgic gait  R26.89 781.2         Jaquan Mckay reports: Patient notes that he saw MD and was encouraged to call ankle surgeon to see if he could be sooner than 2023.  Patient notes that he has been experiencing increased swelling and pain in the left ankle.  Subjective Questionnaire: LEFS:   Clinical Progress: worse  Home Program Compliance: Yes      Subjective Evaluation    Pain  Current pain ratin  At best pain ratin  At worst pain ratin             Objective          Active Range of Motion   Left Ankle/Foot   Dorsiflexion (ke): 8 degrees   Plantar flexion: 55 degrees   Inversion: 35 degrees   Eversion: 10 degrees     Strength/Myotome Testing     Left Ankle/Foot   Dorsiflexion: 4  Plantar flexion: 4  Inversion: 4-  Eversion: 4    Ambulation     Comments   Decreased left stance phase and weight shift.          Assessment & Plan     Assessment    Assessment details: Patient has been attending therapy for left ankle pain.  He has attended therapy for a total of 22 sessions, dating from 2023 to 2023.  Patient will be discharged, due to reaching maximum level of function at this time.  Patient has met 2/3 STGs and 1/4 LTGs.  He is encouraged to continue with HEP following discharge from therapy.    Goals  Plan Goals: STG 6 weeks  1. Pt will be instructed in a HEP.  Met  2. Pt will improve his ankle ROM by 10 degrees for both DF and PF.  Met  3.  Pt will report pain no greater 6/10.  Not met    LTG 12 weeks    1.  Pt will improve his LEFs to less than 30%.  Not met  2. Pt will demonstrate 4/5 gross left ankle stability.  Met  3. Pt will report pain no greater than 3/10 with increased walking.  Not Met  4. Pt will amb with with improved symmetry and velocity.  Not met    Plan  Therapy options: will not be seen for skilled therapy services  Treatment plan discussed with: patient  Plan details: Patient to be discharged at conclusion of today's session.           Recommendations: Discharge      Timed:         Manual Therapy:         mins  32469;     Therapeutic Exercise:    34     mins  71490;     Neuromuscular Jazmin:        mins  25031;    Therapeutic Activity:          mins  32676;     Gait Training:           mins  95953;     Ultrasound:          mins  00112;    Ionto                                   mins   96654  Self Care                            mins   50949    Un-Timed:  Electrical Stimulation:         mins  66758 ( );  Dry Needling          mins self-pay  Traction          mins 65376  Re-Eval                               mins  06351  Canalith Repos         mins 21478  Moist heat-5 min    Timed Treatment:   34   mins   Total Treatment:     39   mins          PT: Hanna Burks PT     [place] License:  [id number]    Electronically signed by Hanna Burks PT, 04/14/23, 2:50 PM EDT    Certification Period: 4/14/2023 thru 7/12/2023  I certify that the therapy services are furnished while this patient is under my care.  The services outlined above are required by this patient, and will be reviewed every 90 days.         Physician Signature:__________________________________________________    PHYSICIAN: Char Xiao DPM  NPI: [id number]                                      DATE:  :     Please sign and return via fax to [email or web address] . Thank you, [facility]

## 2023-05-10 ENCOUNTER — OFFICE VISIT (OUTPATIENT)
Dept: FAMILY MEDICINE CLINIC | Facility: CLINIC | Age: 53
End: 2023-05-10
Payer: COMMERCIAL

## 2023-05-10 VITALS
OXYGEN SATURATION: 98 % | TEMPERATURE: 97.2 F | WEIGHT: 233 LBS | HEART RATE: 94 BPM | HEIGHT: 67 IN | DIASTOLIC BLOOD PRESSURE: 50 MMHG | SYSTOLIC BLOOD PRESSURE: 90 MMHG | BODY MASS INDEX: 36.57 KG/M2

## 2023-05-10 DIAGNOSIS — E78.2 MIXED HYPERLIPIDEMIA: ICD-10-CM

## 2023-05-10 DIAGNOSIS — M79.672 LEFT FOOT PAIN: ICD-10-CM

## 2023-05-10 DIAGNOSIS — K21.9 GASTROESOPHAGEAL REFLUX DISEASE WITHOUT ESOPHAGITIS: ICD-10-CM

## 2023-05-10 DIAGNOSIS — I10 ESSENTIAL HYPERTENSION: Primary | ICD-10-CM

## 2023-05-10 DIAGNOSIS — G47.30 SLEEP HYPOPNEA: ICD-10-CM

## 2023-05-10 PROCEDURE — 99214 OFFICE O/P EST MOD 30 MIN: CPT | Performed by: NURSE PRACTITIONER

## 2023-05-10 PROCEDURE — 1159F MED LIST DOCD IN RCRD: CPT | Performed by: NURSE PRACTITIONER

## 2023-05-10 PROCEDURE — 3078F DIAST BP <80 MM HG: CPT | Performed by: NURSE PRACTITIONER

## 2023-05-10 PROCEDURE — 1160F RVW MEDS BY RX/DR IN RCRD: CPT | Performed by: NURSE PRACTITIONER

## 2023-05-10 PROCEDURE — 3074F SYST BP LT 130 MM HG: CPT | Performed by: NURSE PRACTITIONER

## 2023-05-10 RX ORDER — FUROSEMIDE 20 MG/1
20 TABLET ORAL DAILY
Qty: 2 TABLET | Refills: 0 | Status: SHIPPED | OUTPATIENT
Start: 2023-05-10

## 2023-05-10 NOTE — LETTER
May 10, 2023    Jaquan Mckay  [address]  [place] [address]      Patient: Jaquan Mckay   YOB: 1970         To whom it may concern:     Jaquan Mckay has been under my care for approximately 6+ years.  Due to his chronic joint pain and knee problems as well has his other limitations with mobility it is recommended for him to have a lower level apartment without stairs or steps.       It is also advised for him to have safety mechanisms such as handles at the toilet and shower to assist him with his mobility.  Because of his knee and ankle problem, he does fall frequently.  He is a high risk for fall.  If available he could benefit from a handicap apartment  as he needs better shower/tub accessibility.          Sincerely,             BALDOMERO Thao APRN

## 2023-05-10 NOTE — PROGRESS NOTES
"Subjective   Jaquan Mckay is a 52 y.o. male.     Chief Complaint   Patient presents with   • Hypertension       History of Present Illness     HTN-ongoing.  Some low reading today on triage.  He is on Lisinopril 30 mg.  No CP or palpitations.    Fall-\"last week in the parking lot\".  He reports \"black and blue\" on his hip with a \"knot\" on his right buttock.   He reports that it took him a long time to get back up.  He reports that he turned sideways and his ankle popped and he went down without warning.  He request to have a shower chair as he is having increasing difficulty getting in and out of the shower.  He will follow up with Dr Florez about his ankle on May 18.    Oxygen use-feels he is doing ok other than not leaving his cannula on well.  He reports that he has woke with it off several times.  He reports that he does feel he is sleeping better on most nights.   GERD-stable on Dexilant.  No negative side effects.  No negative side effects of medication.  Patient does avoid foods that trigger reflux symptoms.  Denies any recent exacerbations.  Hyperlipidemia-has had a change in cholesterol meds.  This is due to elevated LDL & Total cholesterol.    Lab Results   Component Value Date    CHOL 259 (H) 03/22/2023    CHLPL 232 (H) 04/05/2016    TRIG 62 03/22/2023    HDL 70 (H) 03/22/2023     (H) 03/22/2023       The following portions of the patient's history were reviewed and updated as appropriate: CC, ROS, allergies, current medications, past family history, past medical history, past social history, past surgical history and problem list.      Review of Systems   Constitutional: Positive for fatigue. Negative for appetite change, unexpected weight gain and unexpected weight loss.   HENT: Negative for congestion, ear pain, postnasal drip, rhinorrhea, sore throat, swollen glands, trouble swallowing and voice change.    Eyes: Negative for pain and visual disturbance.   Respiratory: Negative for cough, chest " "tightness, shortness of breath and wheezing.    Cardiovascular: Negative for chest pain, palpitations and leg swelling.   Gastrointestinal: Negative for abdominal pain, blood in stool, constipation, diarrhea, nausea, vomiting and indigestion.   Genitourinary: Negative for dysuria, hematuria and urgency.   Musculoskeletal: Positive for arthralgias, gait problem, joint swelling (ankle) and myalgias. Negative for back pain.   Skin: Negative for color change and skin lesions.   Allergic/Immunologic: Negative.    Neurological: Negative for numbness, headache and confusion.   Hematological: Negative.    Psychiatric/Behavioral: Positive for sleep disturbance and stress. Negative for dysphoric mood and suicidal ideas. The patient is not nervous/anxious.    All other systems reviewed and are negative.      Objective     BP 90/50 (BP Location: Right arm, Patient Position: Sitting, Cuff Size: Adult)   Pulse 94   Temp 97.2 °F (36.2 °C) (Temporal)   Ht 170.2 cm (67.01\")   Wt 106 kg (233 lb)   SpO2 98%   BMI 36.48 kg/m²     Physical Exam  Vitals reviewed.   Constitutional:       General: He is not in acute distress.     Appearance: He is well-developed. He is obese. He is not diaphoretic.   HENT:      Head: Normocephalic and atraumatic.      Jaw: No tenderness.      Comments: Oropharynx not examined.  Patient is presently wearing a face covering/mask due to COVID-19 pandemic.     Right Ear: Hearing, tympanic membrane, ear canal and external ear normal.      Left Ear: Hearing, tympanic membrane, ear canal and external ear normal.      Nose: Nose normal. No nasal tenderness or congestion.      Right Sinus: No maxillary sinus tenderness or frontal sinus tenderness.      Left Sinus: No maxillary sinus tenderness or frontal sinus tenderness.      Mouth/Throat:      Lips: Pink.      Mouth: Mucous membranes are moist.      Pharynx: Oropharynx is clear. Uvula midline.   Eyes:      General: Lids are normal. No scleral icterus.     " Extraocular Movements:      Right eye: Normal extraocular motion and no nystagmus.      Left eye: Normal extraocular motion and no nystagmus.      Conjunctiva/sclera: Conjunctivae normal.      Pupils: Pupils are equal, round, and reactive to light.   Neck:      Thyroid: No thyromegaly or thyroid tenderness.      Vascular: No carotid bruit or JVD.      Trachea: No tracheal tenderness.   Cardiovascular:      Rate and Rhythm: Normal rate and regular rhythm.      Pulses:           Dorsalis pedis pulses are 2+ on the right side and 2+ on the left side.        Posterior tibial pulses are 2+ on the right side and 2+ on the left side.      Heart sounds: Normal heart sounds, S1 normal and S2 normal. No murmur heard.  Pulmonary:      Effort: Pulmonary effort is normal. No accessory muscle usage, prolonged expiration or respiratory distress.      Breath sounds: Normal breath sounds.   Chest:      Chest wall: No tenderness.   Abdominal:      General: Bowel sounds are normal. There is no distension.      Palpations: Abdomen is soft. There is no hepatomegaly, splenomegaly or mass.      Tenderness: There is no abdominal tenderness.   Musculoskeletal:         General: Tenderness present.      Cervical back: Normal range of motion and neck supple.      Thoracic back: Tenderness present.      Lumbar back: Tenderness present.      Right lower leg: No edema.      Left lower leg: Tenderness present. No edema.      Left foot: Decreased range of motion. Tenderness present.      Comments: No muscular atrophy or flaccidity.   Lymphadenopathy:      Head:      Right side of head: No submental or submandibular adenopathy.      Left side of head: No submental or submandibular adenopathy.      Cervical: No cervical adenopathy.      Right cervical: No superficial cervical adenopathy.     Left cervical: No superficial cervical adenopathy.   Skin:     General: Skin is warm and dry.      Capillary Refill: Capillary refill takes less than 2 seconds.       Coloration: Skin is not jaundiced or pale.      Findings: No erythema.      Nails: There is no clubbing.   Neurological:      Mental Status: He is alert and oriented to person, place, and time.      Cranial Nerves: No cranial nerve deficit or facial asymmetry.      Sensory: No sensory deficit.      Motor: No weakness, tremor, atrophy or abnormal muscle tone.      Coordination: Coordination normal.      Gait: Gait abnormal (antalgic).      Deep Tendon Reflexes: Reflexes are normal and symmetric.   Psychiatric:         Attention and Perception: He is attentive.         Mood and Affect: Mood normal. Mood is not anxious or depressed.         Speech: Speech normal.         Behavior: Behavior normal. Behavior is cooperative.         Thought Content: Thought content normal.         Cognition and Memory: Cognition normal. Memory is impaired.         Judgment: Judgment normal.           Diagnoses and all orders for this visit:    1. Essential hypertension (Primary)  Assessment & Plan:  Continue lisinopril 30 mg.  Continue under the care of cardiology.  Ambulatory BP monitoring either at home or random community checks.  Patient to report continued elevations >140/90.  Patient may come by office for checks if needed.       2. Mixed hyperlipidemia  Assessment & Plan:  Continue Crestor 40 mg   We will continue to monitor Lipid panel      3. Left foot pain  Comments:  continue under the care of podiatry    4. Sleep hypopnea  Comments:  continue oxygen as directed    5. Gastroesophageal reflux disease without esophagitis  Assessment & Plan:  Continue Dexilant 60 mg and pepcid 40 mg BID as directed.  Continue under the care of GI as directed      Other orders  -     furosemide (LASIX) 20 MG tablet; Take 1 tablet by mouth Daily. For 2 days  Dispense: 2 tablet; Refill: 0         Understands disease processes and need for medications.  Understands reasons for urgent and emergent care.  Patient (& family) verbalized agreement for  treatment plan.   Emotional support and active listening provided.  Patient provided time to verbalize feelings.    [facility] reviewed today and consistent.  Will refill prescribed controlled medication today.  Patient is aware they cannot receive narcotics from any other provider except if under care of pain management or speciality clinic.  Risk and benefits of medication use has been reviewed.  History and physical exam exhibit continued safe and appropriate use of controlled substances.  The patient is aware of the potential for addiction and dependence.  This patient has been made aware of the appropriate use of such medications, including potential risk of somnolence, limited ability to drive and / or work safely, and potential for overdose.    It has also been made clear that these medications are for use by this patient only, without concomitant use of alcohol or other substances unless prescribed/advised by medical provider.  Patient understands they may be subject to UDS and pill counts at random.      Patient considered to be low risk for addiction due to use of single controlled medications.  Patient understands and accepts these risks.  Patient need for medication will be reassessed at each visit.  Doses will be adjusted according to patient need and findings.    Goal of TX: Patient will not have any adverse reactions of medication.  Patient will have improvement in sleep hygiene/pattern with use of Restoril as needed as directed.    Medication Dispense Information    Temazepam   Dispensed: 2023 12:00 AM   Written:  2023   Unit strength: 30MG   Days supply: 30   Quantity: 30 each   Refills remainin   Pharmacy: [facility] [place]   Authorizing provider: LENNOX ROE   Received from: [facility] (Fill History)   Brand or Generic:       RTC 1 month, sooner if needed.           This document has been electronically signed by:  BALDOMERO Thao FNP-C Dragon disclaimer:  Part of  this note may be an electronic transcription/translation of spoken language to printed text using the Dragon Dictation System.

## 2023-05-15 DIAGNOSIS — E55.9 VITAMIN D DEFICIENCY: ICD-10-CM

## 2023-05-15 DIAGNOSIS — M79.672 LEFT FOOT PAIN: ICD-10-CM

## 2023-05-15 DIAGNOSIS — I10 ESSENTIAL HYPERTENSION: ICD-10-CM

## 2023-05-15 DIAGNOSIS — J45.30 MILD PERSISTENT ASTHMA WITHOUT COMPLICATION: ICD-10-CM

## 2023-05-15 DIAGNOSIS — M62.838 MUSCLE SPASM: ICD-10-CM

## 2023-05-15 RX ORDER — LISINOPRIL 30 MG/1
TABLET ORAL
Qty: 30 TABLET | Refills: 5 | Status: SHIPPED | OUTPATIENT
Start: 2023-05-15

## 2023-05-15 RX ORDER — CYCLOBENZAPRINE HCL 5 MG
5-10 TABLET ORAL 3 TIMES DAILY PRN
Qty: 120 TABLET | Refills: 2 | Status: SHIPPED | OUTPATIENT
Start: 2023-05-15

## 2023-05-15 RX ORDER — ERGOCALCIFEROL 1.25 MG/1
50000 CAPSULE ORAL
Qty: 4 CAPSULE | Refills: 5 | Status: SHIPPED | OUTPATIENT
Start: 2023-05-15

## 2023-05-15 RX ORDER — DEXAMETHASONE 4 MG/1
TABLET ORAL
Qty: 12 G | Refills: 2 | Status: SHIPPED | OUTPATIENT
Start: 2023-05-15

## 2023-05-24 DIAGNOSIS — Z79.899 ENCOUNTER FOR MONITORING PHENYTOIN THERAPY: Primary | ICD-10-CM

## 2023-05-24 DIAGNOSIS — Z51.81 ENCOUNTER FOR MONITORING PHENYTOIN THERAPY: Primary | ICD-10-CM

## 2023-05-25 ENCOUNTER — LAB (OUTPATIENT)
Dept: LAB | Facility: HOSPITAL | Age: 53
End: 2023-05-25
Payer: COMMERCIAL

## 2023-05-25 ENCOUNTER — TELEPHONE (OUTPATIENT)
Dept: FAMILY MEDICINE CLINIC | Facility: CLINIC | Age: 53
End: 2023-05-25

## 2023-05-25 ENCOUNTER — OFFICE VISIT (OUTPATIENT)
Dept: FAMILY MEDICINE CLINIC | Facility: CLINIC | Age: 53
End: 2023-05-25

## 2023-05-25 VITALS
DIASTOLIC BLOOD PRESSURE: 82 MMHG | SYSTOLIC BLOOD PRESSURE: 122 MMHG | WEIGHT: 232 LBS | BODY MASS INDEX: 36.41 KG/M2 | HEIGHT: 67 IN | TEMPERATURE: 98.6 F | HEART RATE: 94 BPM | RESPIRATION RATE: 18 BRPM | OXYGEN SATURATION: 98 %

## 2023-05-25 DIAGNOSIS — K59.04 CHRONIC IDIOPATHIC CONSTIPATION: ICD-10-CM

## 2023-05-25 DIAGNOSIS — Z01.818 PREOPERATIVE CLEARANCE: Primary | ICD-10-CM

## 2023-05-25 DIAGNOSIS — Z79.899 LONG-TERM USE OF HIGH-RISK MEDICATION: ICD-10-CM

## 2023-05-25 DIAGNOSIS — I10 ESSENTIAL HYPERTENSION: ICD-10-CM

## 2023-05-25 DIAGNOSIS — J32.1 CHRONIC FRONTAL SINUSITIS: ICD-10-CM

## 2023-05-25 PROCEDURE — 1159F MED LIST DOCD IN RCRD: CPT | Performed by: NURSE PRACTITIONER

## 2023-05-25 PROCEDURE — 3079F DIAST BP 80-89 MM HG: CPT | Performed by: NURSE PRACTITIONER

## 2023-05-25 PROCEDURE — 80185 ASSAY OF PHENYTOIN TOTAL: CPT | Performed by: NURSE PRACTITIONER

## 2023-05-25 PROCEDURE — 36415 COLL VENOUS BLD VENIPUNCTURE: CPT | Performed by: NURSE PRACTITIONER

## 2023-05-25 PROCEDURE — 80186 ASSAY OF PHENYTOIN FREE: CPT | Performed by: NURSE PRACTITIONER

## 2023-05-25 PROCEDURE — 99214 OFFICE O/P EST MOD 30 MIN: CPT | Performed by: NURSE PRACTITIONER

## 2023-05-25 PROCEDURE — 3074F SYST BP LT 130 MM HG: CPT | Performed by: NURSE PRACTITIONER

## 2023-05-25 PROCEDURE — 1160F RVW MEDS BY RX/DR IN RCRD: CPT | Performed by: NURSE PRACTITIONER

## 2023-05-25 RX ORDER — ASPIRIN 81 MG/1
81 TABLET ORAL DAILY
Qty: 30 TABLET | Refills: 5 | Status: SHIPPED | OUTPATIENT
Start: 2023-05-25

## 2023-05-25 RX ORDER — LORATADINE 10 MG/1
10 TABLET ORAL DAILY PRN
Qty: 30 TABLET | Refills: 5 | Status: SHIPPED | OUTPATIENT
Start: 2023-05-25

## 2023-05-25 RX ORDER — ISOPROPYL ALCOHOL 0.7 ML/1
1 SWAB TOPICAL 2 TIMES DAILY
Qty: 100 EACH | Refills: 5 | Status: SHIPPED | OUTPATIENT
Start: 2023-05-25

## 2023-05-25 RX ORDER — LUBIPROSTONE 8 UG/1
8 CAPSULE ORAL 2 TIMES DAILY WITH MEALS
Qty: 60 CAPSULE | Refills: 2 | Status: SHIPPED | OUTPATIENT
Start: 2023-05-25

## 2023-05-25 NOTE — PROGRESS NOTES
Subjective   Jaquan Mckay is a 52 y.o. male.     Chief Complaint   Patient presents with   • Surgery evaluation       History of Present Illness     Upcoming surgery-will be having an upcoming foot surgery.  He had preop labs.  His Dilantin level was 29.  He was advised to hold his dose last PM and repeat this morning.  But level is pending.  Low blood sugar-reports he has had some low blood sugars episodes with feeling of weakness.  His neighbor checked his blood sugar. He was low and needed to have a snack.  He reports that he has had several episodes and does not have anyway to monitor blood sugars.  He has not been snacking much.    Hypertension-stable With lisinopril 30 mg use.  He denies any chest pain.  No palpitations.  He feels the shortness of breath has been improved with use of oxygen at night he continues under the care of immuno specialist for allergy injections and he continues his inhalers and his allergy medications as directed.  Insomnia-she has improved with use of Restoril.  He is also on Remeron 30 mg.  He is taking 1.5 tablets nightly.  Constipation-patient reports he has had some increased constipation.  He was once on Amitiza and does feel that is helped him more than any of the other medications he has been on he has been on multiple other medications which caused either a rash or were ineffective.  He would like to be able to obtain the Amitiza again    The following portions of the patient's history were reviewed and updated as appropriate: CC, ROS, allergies, current medications, past family history, past medical history, past social history, past surgical history and problem list.      Review of Systems   Constitutional: Positive for fatigue. Negative for appetite change, unexpected weight gain and unexpected weight loss.   HENT: Negative for congestion, ear pain, postnasal drip, rhinorrhea, sore throat, swollen glands, trouble swallowing and voice change.    Eyes: Negative for pain and  "visual disturbance.   Respiratory: Negative for cough, chest tightness, shortness of breath and wheezing.    Cardiovascular: Negative for chest pain, palpitations and leg swelling.   Gastrointestinal: Positive for constipation. Negative for abdominal pain, blood in stool, diarrhea, nausea and indigestion.   Genitourinary: Negative for dysuria, hematuria and urgency.   Musculoskeletal: Positive for arthralgias, back pain, gait problem and myalgias. Negative for joint swelling.   Skin: Negative for color change and skin lesions.   Allergic/Immunologic: Negative.    Neurological: Negative for numbness, headache and confusion.   Hematological: Negative.    Psychiatric/Behavioral: Negative for dysphoric mood, sleep disturbance and suicidal ideas. The patient is not nervous/anxious.    All other systems reviewed and are negative.      Objective     /82   Pulse 94   Temp 98.6 °F (37 °C)   Resp 18   Ht 170.2 cm (67.01\")   Wt 105 kg (232 lb)   SpO2 98%   BMI 36.33 kg/m²     Physical Exam  Vitals reviewed.   Constitutional:       General: He is not in acute distress.     Appearance: He is well-developed. He is obese. He is not diaphoretic.   HENT:      Head: Normocephalic and atraumatic.      Jaw: No tenderness.      Comments: Oropharynx not examined.  Patient is presently wearing a face covering/mask due to COVID-19 pandemic.     Right Ear: Hearing, tympanic membrane, ear canal and external ear normal.      Left Ear: Hearing, tympanic membrane, ear canal and external ear normal.      Nose: Nose normal. No nasal tenderness or congestion.      Right Sinus: No maxillary sinus tenderness or frontal sinus tenderness.      Left Sinus: No maxillary sinus tenderness or frontal sinus tenderness.      Mouth/Throat:      Lips: Pink.      Mouth: Mucous membranes are moist.      Pharynx: Oropharynx is clear. Uvula midline.   Eyes:      General: Lids are normal. No scleral icterus.     Extraocular Movements:      Right eye: " Normal extraocular motion and no nystagmus.      Left eye: Normal extraocular motion and no nystagmus.      Conjunctiva/sclera: Conjunctivae normal.      Pupils: Pupils are equal, round, and reactive to light.   Neck:      Thyroid: No thyromegaly or thyroid tenderness.      Vascular: No carotid bruit or JVD.      Trachea: No tracheal tenderness.   Cardiovascular:      Rate and Rhythm: Normal rate and regular rhythm.      Pulses:           Dorsalis pedis pulses are 2+ on the right side and 2+ on the left side.        Posterior tibial pulses are 2+ on the right side and 2+ on the left side.      Heart sounds: Normal heart sounds, S1 normal and S2 normal. No murmur heard.  Pulmonary:      Effort: Pulmonary effort is normal. No accessory muscle usage, prolonged expiration or respiratory distress.      Breath sounds: Normal breath sounds.   Chest:      Chest wall: No tenderness.   Abdominal:      General: Bowel sounds are normal. There is no distension.      Palpations: Abdomen is soft. There is no hepatomegaly, splenomegaly or mass.      Tenderness: There is no abdominal tenderness.   Musculoskeletal:         General: Tenderness present.      Cervical back: Normal range of motion and neck supple.      Thoracic back: Tenderness present.      Lumbar back: Tenderness present.      Right lower leg: No edema.      Left lower leg: Tenderness present. No edema.      Left foot: Decreased range of motion. Tenderness present.      Comments: No muscular atrophy or flaccidity.   Lymphadenopathy:      Head:      Right side of head: No submental or submandibular adenopathy.      Left side of head: No submental or submandibular adenopathy.      Cervical: No cervical adenopathy.      Right cervical: No superficial cervical adenopathy.     Left cervical: No superficial cervical adenopathy.   Skin:     General: Skin is warm and dry.      Capillary Refill: Capillary refill takes less than 2 seconds.      Coloration: Skin is not jaundiced  or pale.      Findings: No erythema.      Nails: There is no clubbing.   Neurological:      Mental Status: He is alert and oriented to person, place, and time.      Cranial Nerves: No cranial nerve deficit or facial asymmetry.      Sensory: No sensory deficit.      Motor: No weakness, tremor, atrophy or abnormal muscle tone.      Coordination: Coordination normal.      Gait: Gait abnormal (antalgic).      Deep Tendon Reflexes: Reflexes are normal and symmetric.   Psychiatric:         Attention and Perception: He is attentive.         Mood and Affect: Mood normal. Mood is not anxious or depressed.         Speech: Speech normal.         Behavior: Behavior normal. Behavior is cooperative.         Thought Content: Thought content normal.         Cognition and Memory: Cognition normal. Memory is impaired.         Judgment: Judgment normal.           Diagnoses and all orders for this visit:    1. Preoperative clearance (Primary)  Comments:  cleared for surgery.  low risk for adverse outcomes     2. Essential hypertension  Comments:  Continue under the care of cardiology.  Continue lisinopril 30 mg  Orders:  -     aspirin 81 MG EC tablet; Take 1 tablet by mouth Daily.  Dispense: 30 tablet; Refill: 5    3. Chronic frontal sinusitis  Comments:  We will see if patient is able to resume immunotherapy  Continue montelukast 10 mg nasal sprays, and Claritin 10 mg  Orders:  -     loratadine (CLARITIN) 10 MG tablet; Take 1 tablet by mouth Daily As Needed for Allergies.  Dispense: 30 tablet; Refill: 5    4. Long-term use of high-risk medication  -     Urine Drug Screen - Urine, Clean Catch; Future    5. Chronic idiopathic constipation  Overview:  Added automatically from request for surgery [id number]    Orders:  -     lubiprostone (Amitiza) 8 MCG capsule; Take 1 capsule by mouth 2 (Two) Times a Day With Meals.  Dispense: 60 capsule; Refill: 2    Other orders  -     Alcohol Swabs (Alcohol Wipes) 70 % pads; 1 each 2 (Two) Times a Day.   Dispense: 100 each; Refill: 5         Understands disease processes and need for medications.  Understands reasons for urgent and emergent care.  Patient (& family) verbalized agreement for treatment plan.   Emotional support and active listening provided.  Patient provided time to verbalize feelings.    Patient advised to hold his Restoril night prior to surgery due to his need for sedation for surgery.  He may take his Remeron as scheduled.  Do not resume Lasix, aspirin, or any potassium supplement at this time.  We will continue to monitor for edema and potassium levels.  He may resume his aspirin as directed postsurgery.    Return to the clinic as scheduled on June 14, 2023.  Sooner if needed for problems or concerns          This document has been electronically signed by:  BALDOMERO Thao, YESY-C    Dragon disclaimer:  Part of this note may be an electronic transcription/translation of spoken language to printed text using the Dragon Dictation System.

## 2023-05-25 NOTE — PATIENT INSTRUCTIONS
Hold the restoril/Temazepam 30 mg the night before surgery    Start aspirin back when you get home after your surgery procedure.

## 2023-05-29 LAB
PHENYTOIN FREE SERPL-MCNC: 1 UG/ML (ref 1–2)
PHENYTOIN SERPL-MCNC: 17.8 UG/ML (ref 10–20)

## 2023-06-08 ENCOUNTER — TELEPHONE (OUTPATIENT)
Dept: FAMILY MEDICINE CLINIC | Facility: CLINIC | Age: 53
End: 2023-06-08
Payer: COMMERCIAL

## 2023-06-08 NOTE — TELEPHONE ENCOUNTER
----- Message from BALDOMERO Thao sent at 6/8/2023  4:50 PM EDT -----  It is ready to fax   ----- Message -----  From: Gissell Isidro MA  Sent: 6/8/2023   3:51 PM EDT  To: BALDOMERO Thao    Patient calling, states that he needs wheelchair due to surgery, he cannot walk.  Please send order to [facility] in [place] they told him that his insurance would pay for one, just need order.

## 2023-06-14 ENCOUNTER — OFFICE VISIT (OUTPATIENT)
Dept: FAMILY MEDICINE CLINIC | Facility: CLINIC | Age: 53
End: 2023-06-14
Payer: COMMERCIAL

## 2023-06-14 VITALS
TEMPERATURE: 98.4 F | RESPIRATION RATE: 18 BRPM | HEART RATE: 94 BPM | HEIGHT: 67 IN | SYSTOLIC BLOOD PRESSURE: 132 MMHG | DIASTOLIC BLOOD PRESSURE: 90 MMHG | BODY MASS INDEX: 36.41 KG/M2 | WEIGHT: 232 LBS | OXYGEN SATURATION: 100 %

## 2023-06-14 DIAGNOSIS — F32.A ANXIETY AND DEPRESSION: Primary | ICD-10-CM

## 2023-06-14 DIAGNOSIS — I10 ESSENTIAL HYPERTENSION: ICD-10-CM

## 2023-06-14 DIAGNOSIS — Z09 ENCOUNTER FOR EXAMINATION FOLLOWING TREATMENT AT HOSPITAL: ICD-10-CM

## 2023-06-14 DIAGNOSIS — F41.9 ANXIETY AND DEPRESSION: Primary | ICD-10-CM

## 2023-06-14 DIAGNOSIS — F41.1 GENERALIZED ANXIETY DISORDER: ICD-10-CM

## 2023-06-14 PROCEDURE — 1160F RVW MEDS BY RX/DR IN RCRD: CPT | Performed by: NURSE PRACTITIONER

## 2023-06-14 PROCEDURE — 99214 OFFICE O/P EST MOD 30 MIN: CPT | Performed by: NURSE PRACTITIONER

## 2023-06-14 PROCEDURE — 3080F DIAST BP >= 90 MM HG: CPT | Performed by: NURSE PRACTITIONER

## 2023-06-14 PROCEDURE — 1159F MED LIST DOCD IN RCRD: CPT | Performed by: NURSE PRACTITIONER

## 2023-06-14 PROCEDURE — 3075F SYST BP GE 130 - 139MM HG: CPT | Performed by: NURSE PRACTITIONER

## 2023-06-14 RX ORDER — ARIPIPRAZOLE 2 MG/1
2 TABLET ORAL DAILY
Qty: 30 TABLET | Refills: 1 | Status: SHIPPED | OUTPATIENT
Start: 2023-06-14

## 2023-06-14 NOTE — ASSESSMENT & PLAN NOTE
Continue lisinopril 30 mg.  Continue under the care of cardiology.  Ambulatory BP monitoring either at home or random community checks.  Patient to report continued elevations >140/90.  Patient may come by office for checks if needed.   Suspect elevation today is due to his acute pain status.  Patient will continue to monitor blood pressure at home and report sustained elevations

## 2023-06-14 NOTE — PROGRESS NOTES
Subjective   Jaquan Mckay is a 52 y.o. male.     Chief Complaint   Patient presents with    surgery follow up       History of Present Illness     Surgery follow-up-patient is here to follow-up after outpatient surgery visit.  Surgery was done June 2, 2023 by Dr. Florez patient had a left peroneal brevis, tarsal tunnel release, ankle arthroscopy with synovectomy performed.  He has also been to the ER on 6/4/2023 for evaluation of pain.  Records show that he had to go to the ER due to swelling.  His cast had to be removed and a Doppler was performed to rule out DVT.  He was placed in a splint.  He is also followed up with Dr. Florez on June 5.  Plan was for strict nonweightbearing and follow-up in 2 weeks a short leg cast was applied at that time.    Foot pain-ongoing since surgery-he is currently in a cast.  He reports that the lateral side of his foot is painful due to the pressure from the cast.  He has some continued swelling.  He is trying to be mobile in his wheelchair.  He reports he is having muscle spasms in his foot and heel several times per day.    Hypertension-chronic and ongoing.  Patient is borderline elevated today.  He is in acute pain.  He is currently on lisinopril 30 mg.  He is followed by cardiology.  No CP or palpitations.   Insomnia-reports he has been having panic attacks and is not sleeping.  He is on remeron 45 mg but reports it is not helping.  He feels he needs additional medication.  He has failed several meds.  Including paxil, BuSpar, Viibryd, Zoloft, and Seroquel.  He has also failed Pristiq.  Most of these medications have given him a rash or he has had shortness of breath.  He has been stressed about his health.      The following portions of the patient's history were reviewed and updated as appropriate: CC, ROS, allergies, current medications, past family history, past medical history, past social history, past surgical history and problem list.      Review of Systems  "  Constitutional:  Positive for fatigue. Negative for appetite change, unexpected weight gain and unexpected weight loss.   HENT:  Negative for congestion, ear pain, postnasal drip, rhinorrhea, sore throat, swollen glands, trouble swallowing and voice change.    Eyes:  Negative for pain and visual disturbance.   Respiratory:  Negative for cough, chest tightness, shortness of breath and wheezing.    Cardiovascular:  Negative for chest pain, palpitations and leg swelling.   Gastrointestinal:  Negative for abdominal pain, blood in stool, constipation, diarrhea, nausea, vomiting and indigestion.   Genitourinary:  Negative for dysuria, hematuria and urgency.   Musculoskeletal:  Positive for arthralgias, back pain, gait problem and myalgias. Negative for joint swelling.   Skin:  Negative for color change and skin lesions.   Allergic/Immunologic: Negative.    Neurological:  Positive for dizziness. Negative for numbness and headache.   Hematological: Negative.    Psychiatric/Behavioral:  Positive for stress. Negative for dysphoric mood, sleep disturbance and suicidal ideas. The patient is nervous/anxious.    All other systems reviewed and are negative.    Objective     /90   Pulse 94   Temp 98.4 °F (36.9 °C)   Resp 18   Ht 170.2 cm (67.01\")   Wt 105 kg (232 lb)   SpO2 100%   BMI 36.33 kg/m²     Physical Exam  Vitals reviewed.   Constitutional:       General: He is not in acute distress.     Appearance: He is well-developed. He is not diaphoretic.      Comments: Seated in wheelchair in exam room.   HENT:      Head: Normocephalic and atraumatic.      Jaw: No tenderness.      Right Ear: Hearing, tympanic membrane, ear canal and external ear normal.      Left Ear: Hearing, tympanic membrane, ear canal and external ear normal.      Nose: Nose normal. No nasal tenderness or congestion.      Right Sinus: No maxillary sinus tenderness or frontal sinus tenderness.      Left Sinus: No maxillary sinus tenderness or " frontal sinus tenderness.      Mouth/Throat:      Lips: Pink.      Mouth: Mucous membranes are moist.      Pharynx: Oropharynx is clear. Uvula midline.   Eyes:      General: Lids are normal. No scleral icterus.     Extraocular Movements:      Right eye: Normal extraocular motion and no nystagmus.      Left eye: Normal extraocular motion and no nystagmus.      Conjunctiva/sclera: Conjunctivae normal.      Pupils: Pupils are equal, round, and reactive to light.   Neck:      Thyroid: No thyromegaly or thyroid tenderness.      Vascular: No carotid bruit or JVD.      Trachea: No tracheal tenderness.   Cardiovascular:      Rate and Rhythm: Normal rate and regular rhythm.      Pulses:           Dorsalis pedis pulses are 2+ on the right side and 2+ on the left side.        Posterior tibial pulses are 2+ on the right side and 2+ on the left side.      Heart sounds: Normal heart sounds, S1 normal and S2 normal. No murmur heard.  Pulmonary:      Effort: Pulmonary effort is normal. No accessory muscle usage, prolonged expiration or respiratory distress.      Breath sounds: Normal breath sounds.   Chest:      Chest wall: No tenderness.   Abdominal:      General: Bowel sounds are normal. There is no distension.      Palpations: Abdomen is soft. There is no hepatomegaly, splenomegaly or mass.      Tenderness: There is no abdominal tenderness.   Musculoskeletal:         General: Swelling (left lower extremity) and tenderness present.      Cervical back: Normal range of motion and neck supple.      Thoracic back: Tenderness present.      Lumbar back: Tenderness present.      Right lower leg: No edema.      Left lower leg: No edema.      Left ankle: Tenderness present.      Left foot: Normal capillary refill. Swelling and tenderness present. Normal pulse.      Comments: No muscular atrophy or flaccidity.   Lymphadenopathy:      Head:      Right side of head: No submental or submandibular adenopathy.      Left side of head: No  submental or submandibular adenopathy.      Cervical: No cervical adenopathy.      Right cervical: No superficial cervical adenopathy.     Left cervical: No superficial cervical adenopathy.   Skin:     General: Skin is warm and dry.      Capillary Refill: Capillary refill takes less than 2 seconds.      Coloration: Skin is not jaundiced or pale.      Findings: No erythema.      Nails: There is no clubbing.   Neurological:      Mental Status: He is alert and oriented to person, place, and time.      Cranial Nerves: No cranial nerve deficit or facial asymmetry.      Sensory: No sensory deficit.      Motor: No weakness, tremor, atrophy or abnormal muscle tone.      Coordination: Coordination normal.      Deep Tendon Reflexes: Reflexes are normal and symmetric.      Comments: Nonambulatory today due to weight bearing restriction   Psychiatric:         Attention and Perception: He is attentive.         Mood and Affect: Mood is anxious and depressed.         Speech: Speech normal.         Behavior: Behavior normal. Behavior is cooperative.         Thought Content: Thought content normal.         Cognition and Memory: Cognition normal.         Judgment: Judgment normal.         Diagnoses and all orders for this visit:    1. Anxiety and depression (Primary)  Comments:  Continue Remeron as directed.  We will add Abilify.  Orders:  -     ARIPiprazole (Abilify) 2 MG tablet; Take 1 tablet by mouth Daily.  Dispense: 30 tablet; Refill: 1    2. Essential hypertension  Assessment & Plan:  Continue lisinopril 30 mg.  Continue under the care of cardiology.  Ambulatory BP monitoring either at home or random community checks.  Patient to report continued elevations >140/90.  Patient may come by office for checks if needed.   Suspect elevation today is due to his acute pain status.  Patient will continue to monitor blood pressure at home and report sustained elevations      3. Generalized anxiety disorder  Overview:  With  insomnia    Assessment & Plan:  Encouraged to be consistent with his Remeron.  Encouraged to be consistent with [facility] For therapy sessions      4. Encounter for examination following treatment at hospital  Comments:  records reviewed.        Understands disease processes and need for medications.  Understands reasons for urgent and emergent care.  Patient (& family) verbalized agreement for treatment plan.   Emotional support and active listening provided.  Patient provided time to verbalize feelings.    Hospital records reviewed.  Discussed any specific concerns patient had regarding testing, labs, Etc.   Current outpatient and discharge medications have been reconciled for the patient.  Reviewed by: BALDOMERO Thao    RTC 5-6 weeks for re-eval, sooner if needed.              This document has been electronically signed by:  BALDOMERO Thao, NESHAP-C    Dragon disclaimer:  Part of this note may be an electronic transcription/translation of spoken language to printed text using the Dragon Dictation System.

## 2023-06-15 ENCOUNTER — TELEPHONE (OUTPATIENT)
Dept: FAMILY MEDICINE CLINIC | Facility: CLINIC | Age: 53
End: 2023-06-15
Payer: COMMERCIAL

## 2023-06-30 NOTE — ANESTHESIA PROCEDURE NOTES
Airway  Urgency: elective    Date/Time: 7/23/2020 11:45 AM  Airway not difficult    General Information and Staff    Patient location during procedure: OR  Anesthesiologist: Mohit Rojo DO  CRNA: Chichi Coon CRNA    Indications and Patient Condition  Indications for airway management: airway protection    Preoxygenated: yes  MILS maintained throughout  Mask difficulty assessment: 1 - vent by mask    Final Airway Details  Final airway type: endotracheal airway      Successful airway: ETT  Cuffed: yes   Successful intubation technique: direct laryngoscopy  Endotracheal tube insertion site: oral  Blade: Jamila  Blade size: 3  ETT size (mm): 7.5  Cormack-Lehane Classification: grade I - full view of glottis  Placement verified by: chest auscultation and capnometry   Inital cuff pressure (cm H2O): 21  Cuff volume (mL): 10  Measured from: lips  Number of attempts at approach: 1  Assessment: lips, teeth, and gum same as pre-op and atraumatic intubation    Additional Comments  AOI x 1 PASS, +ETCO2, +R/F/C. MOUTH TEETH GUMS SAME AS PREOP             Pt arrived to unit, A[facility], denies numbness in leg, reports 6/10 pain. Call bell and personal items within reach

## 2023-07-02 NOTE — ANESTHESIA PREPROCEDURE EVALUATION
Anesthesia Evaluation     Patient summary reviewed and Nursing notes reviewed   NPO Solid Status: > 8 hours  NPO Liquid Status: > 8 hours           Airway   Mallampati: I  TM distance: >3 FB  Neck ROM: full  No difficulty expected  Dental    (+) edentulous    Pulmonary     breath sounds clear to auscultation  (+) asthma,shortness of breath,   Cardiovascular   Exercise tolerance: good (4-7 METS)    Rhythm: regular  Rate: normal    (+) hypertension, past MI  >12 months, CAD, angina, DVT, hyperlipidemia,       Neuro/Psych  (+) seizures, headaches, numbness, psychiatric history,     GI/Hepatic/Renal/Endo    (+)  GERD, PUD, GI bleeding , renal disease,     Musculoskeletal     Abdominal     Abdomen: soft.   Substance History      OB/GYN          Other   arthritis,                      Anesthesia Plan    ASA 3     general     intravenous induction     Anesthetic plan, all risks, benefits, and alternatives have been provided, discussed and informed consent has been obtained with: patient.    Plan discussed with CRNA.       Right Occiput Transverse

## 2023-07-26 ENCOUNTER — OFFICE VISIT (OUTPATIENT)
Dept: FAMILY MEDICINE CLINIC | Facility: CLINIC | Age: 53
End: 2023-07-26
Payer: COMMERCIAL

## 2023-07-26 VITALS
DIASTOLIC BLOOD PRESSURE: 78 MMHG | WEIGHT: 240 LBS | SYSTOLIC BLOOD PRESSURE: 116 MMHG | HEIGHT: 67 IN | RESPIRATION RATE: 18 BRPM | HEART RATE: 90 BPM | BODY MASS INDEX: 37.67 KG/M2 | TEMPERATURE: 98.2 F | OXYGEN SATURATION: 98 %

## 2023-07-26 DIAGNOSIS — J34.89 NASAL SORE: ICD-10-CM

## 2023-07-26 DIAGNOSIS — F32.A ANXIETY AND DEPRESSION: Primary | ICD-10-CM

## 2023-07-26 DIAGNOSIS — F41.9 ANXIETY AND DEPRESSION: Primary | ICD-10-CM

## 2023-07-26 DIAGNOSIS — M79.672 LEFT FOOT PAIN: ICD-10-CM

## 2023-07-26 DIAGNOSIS — Z79.899 LONG-TERM USE OF HIGH-RISK MEDICATION: ICD-10-CM

## 2023-07-26 DIAGNOSIS — I10 ESSENTIAL HYPERTENSION: ICD-10-CM

## 2023-07-26 DIAGNOSIS — K21.9 GASTROESOPHAGEAL REFLUX DISEASE WITHOUT ESOPHAGITIS: ICD-10-CM

## 2023-07-26 PROCEDURE — 1160F RVW MEDS BY RX/DR IN RCRD: CPT | Performed by: NURSE PRACTITIONER

## 2023-07-26 PROCEDURE — 99214 OFFICE O/P EST MOD 30 MIN: CPT | Performed by: NURSE PRACTITIONER

## 2023-07-26 PROCEDURE — 3078F DIAST BP <80 MM HG: CPT | Performed by: NURSE PRACTITIONER

## 2023-07-26 PROCEDURE — 3074F SYST BP LT 130 MM HG: CPT | Performed by: NURSE PRACTITIONER

## 2023-07-26 PROCEDURE — 1159F MED LIST DOCD IN RCRD: CPT | Performed by: NURSE PRACTITIONER

## 2023-07-26 RX ORDER — ARIPIPRAZOLE 2 MG/1
2 TABLET ORAL DAILY
Qty: 30 TABLET | Refills: 1 | Status: SHIPPED | OUTPATIENT
Start: 2023-07-26

## 2023-08-02 ENCOUNTER — TREATMENT (OUTPATIENT)
Dept: PHYSICAL THERAPY | Facility: CLINIC | Age: 53
End: 2023-08-02
Payer: COMMERCIAL

## 2023-08-02 ENCOUNTER — TRANSCRIBE ORDERS (OUTPATIENT)
Dept: PHYSICAL THERAPY | Facility: CLINIC | Age: 53
End: 2023-08-02
Payer: COMMERCIAL

## 2023-08-02 DIAGNOSIS — S86.312A TEAR OF PERONEAL TENDON OF LEFT FOOT: ICD-10-CM

## 2023-08-02 DIAGNOSIS — Z98.890 HISTORY OF ANKLE SURGERY: Primary | ICD-10-CM

## 2023-08-02 DIAGNOSIS — M25.473 ANKLE EDEMA: Primary | ICD-10-CM

## 2023-08-02 DIAGNOSIS — R26.89 ANTALGIC GAIT: ICD-10-CM

## 2023-08-02 DIAGNOSIS — M25.572 LEFT ANKLE PAIN, UNSPECIFIED CHRONICITY: ICD-10-CM

## 2023-08-04 ENCOUNTER — TREATMENT (OUTPATIENT)
Dept: PHYSICAL THERAPY | Facility: CLINIC | Age: 53
End: 2023-08-04
Payer: COMMERCIAL

## 2023-08-04 DIAGNOSIS — Z98.890 HISTORY OF ANKLE SURGERY: ICD-10-CM

## 2023-08-04 DIAGNOSIS — S86.312A TEAR OF PERONEAL TENDON OF LEFT FOOT: ICD-10-CM

## 2023-08-04 DIAGNOSIS — R26.89 ANTALGIC GAIT: ICD-10-CM

## 2023-08-04 DIAGNOSIS — M25.572 LEFT ANKLE PAIN, UNSPECIFIED CHRONICITY: Primary | ICD-10-CM

## 2023-08-07 ENCOUNTER — TREATMENT (OUTPATIENT)
Dept: PHYSICAL THERAPY | Facility: CLINIC | Age: 53
End: 2023-08-07
Payer: COMMERCIAL

## 2023-08-07 DIAGNOSIS — R26.89 ANTALGIC GAIT: ICD-10-CM

## 2023-08-07 DIAGNOSIS — S86.312A TEAR OF PERONEAL TENDON OF LEFT FOOT: ICD-10-CM

## 2023-08-07 DIAGNOSIS — M25.572 LEFT ANKLE PAIN, UNSPECIFIED CHRONICITY: Primary | ICD-10-CM

## 2023-08-07 DIAGNOSIS — N40.0 BPH WITHOUT OBSTRUCTION/LOWER URINARY TRACT SYMPTOMS: ICD-10-CM

## 2023-08-07 DIAGNOSIS — Z98.890 HISTORY OF ANKLE SURGERY: ICD-10-CM

## 2023-08-07 PROCEDURE — 97110 THERAPEUTIC EXERCISES: CPT | Performed by: PHYSICAL THERAPIST

## 2023-08-07 RX ORDER — TAMSULOSIN HYDROCHLORIDE 0.4 MG/1
CAPSULE ORAL
Qty: 30 CAPSULE | Refills: 5 | Status: SHIPPED | OUTPATIENT
Start: 2023-08-07

## 2023-08-07 NOTE — PROGRESS NOTES
Physical Therapy Daily Treatment Note      Patient: Jaquan Mckay   : 1970  Referring practitioner: Khurram Florez DPM  Date of Initial Visit: Type: THERAPY  Noted: 2023  Today's Date: 2023  Patient seen for 3 sessions       Visit Diagnoses:    ICD-10-CM ICD-9-CM   1. Left ankle pain, unspecified chronicity  M25.572 719.47   2. Tear of peroneal tendon of left foot  S86.312A 844.8   3. Antalgic gait  R26.89 781.2   4. History of ankle surgery  Z98.890 V45.89       Subjective: Patient states he had a fall at home on Friday night as he was getting in the tub. Pt reports he stepped on the shower mat, and it flew out from under him. Pt  fell and hit the outside of his ankle on the tub, but notes of no increase in swelling or bruising since incident.       Objective   See Exercise, Manual, and Modality Logs for complete treatment.       Assessment/Plan: Pt ambulates into clinic with rollator and walking boot donned. Supervising therapist, Win Rodriguez, PT notified and aware of pt's subjective reports. PT assessed patient with no bruising or increased edema noted around ankle/ or incisional area. Pt reported tenderness in gastroc region, but no signs of DVT. PT educated to contact referring provider or PCP office if he feels is needed or if symptoms increase; pt verbalized understanding. Pt demonstrated no changes in tolerance to activity during PT session. Pt educated to perform to his tolerance. Today's treatment performed including therex as listed to assist with improved left ankle ROM, improved intrinsic and ankle strength; per protocol. Exercise progressed with ankle ABC's initiated within conservative ROM. Pt observed with good tolerance. Cryotherapy applied at conclusion. Pt continues to benefit from therapy services and will be progressed as tolerated. No adverse reactions or signs of distress observed during, and/ or following tx. Continue with PT's POC.      Timed:         Manual Therapy:          mins  21988;     Therapeutic Exercise:    40     mins  74321;     Neuromuscular Jazmin:        mins  22560;    Therapeutic Activity:          mins  76105;     Gait Training:           mins  75308;     Ultrasound:          mins  79208;    Ionto                                   mins   60845  Self Care                            mins   11045      Un-Timed:  Electrical Stimulation:         mins  46154 ( );  Dry Needling          mins self-pay  Traction          mins 71667  Canalith Repos         mins 57361    Timed Treatment:  40    mins   Total Treatment:    47    mins (CP: x 7 min)     Leighann Brown. NEIL [place]  [place] License: [id number]

## 2023-08-10 ENCOUNTER — TREATMENT (OUTPATIENT)
Dept: PHYSICAL THERAPY | Facility: CLINIC | Age: 53
End: 2023-08-10
Payer: COMMERCIAL

## 2023-08-10 DIAGNOSIS — R26.89 ANTALGIC GAIT: ICD-10-CM

## 2023-08-10 DIAGNOSIS — M25.572 LEFT ANKLE PAIN, UNSPECIFIED CHRONICITY: Primary | ICD-10-CM

## 2023-08-10 DIAGNOSIS — S86.312A TEAR OF PERONEAL TENDON OF LEFT FOOT: ICD-10-CM

## 2023-08-10 DIAGNOSIS — Z98.890 HISTORY OF ANKLE SURGERY: ICD-10-CM

## 2023-08-10 NOTE — PROGRESS NOTES
Physical Therapy Daily Treatment Note      Patient: Jaquan Mckay   : 1970  Referring practitioner: Khurram Florez DPM  Date of Initial Visit: Type: THERAPY  Noted: 2023  Today's Date: 8/10/2023  Patient seen for 4 sessions       Visit Diagnoses:    ICD-10-CM ICD-9-CM   1. Left ankle pain, unspecified chronicity  M25.572 719.47   2. Tear of peroneal tendon of left foot  S86.312A 844.8   3. Antalgic gait  R26.89 781.2   4. History of ankle surgery  Z98.890 V45.89       Subjective Evaluation    History of Present Illness    Subjective comment: Pt has 9/10 pain today.     Objective   See Exercise, Manual, and Modality Logs for complete treatment.       Assessment & Plan     Assessment    Assessment details: Tx today consisted of exercises for improved ankle mobility and stability with increased reps to 25; followed by ionto and ice to lateral ankle.  Pt demonstrated good effort today with no distress and reported decreased pain to 6/10 post tx.    Plan  Plan details: Will follow progressing ankle stability and mobility.        Timed:         Manual Therapy:         mins  91574;     Therapeutic Exercise:    40     mins  23684;     Neuromuscular Jazmin:        mins  38612;    Therapeutic Activity:          mins  10027;     Gait Training:           mins  72730;     Ultrasound:          mins  79715;    Ionto                               10    mins   87884  (no charge)  Self Care                            mins   47346  Canalith Repos         mins 27853      Un-Timed:  Electrical Stimulation:         mins  08142 ( );  Dry Needling          mins self-pay  Traction          mins 50027      Timed Treatment:   50   mins   Total Treatment:     50   mins    Win Rodriguez PT  [place] License: [id number]      Electronically signed by Win Rodriguez PT, 08/10/23, 3:11 PM EDT

## 2023-08-15 ENCOUNTER — TREATMENT (OUTPATIENT)
Dept: PHYSICAL THERAPY | Facility: CLINIC | Age: 53
End: 2023-08-15
Payer: COMMERCIAL

## 2023-08-15 DIAGNOSIS — S86.312A TEAR OF PERONEAL TENDON OF LEFT FOOT: ICD-10-CM

## 2023-08-15 DIAGNOSIS — M25.572 LEFT ANKLE PAIN, UNSPECIFIED CHRONICITY: Primary | ICD-10-CM

## 2023-08-15 DIAGNOSIS — Z98.890 HISTORY OF ANKLE SURGERY: ICD-10-CM

## 2023-08-15 DIAGNOSIS — R26.89 ANTALGIC GAIT: ICD-10-CM

## 2023-08-15 NOTE — PROGRESS NOTES
Physical Therapy Daily Treatment Note      Patient: Jaquan Mckay   : 1970  Referring practitioner: Khurram Florez DPM  Date of Initial Visit: Type: THERAPY  Noted: 2023  Today's Date: 8/15/2023  Patient seen for 5 sessions       Visit Diagnoses:    ICD-10-CM ICD-9-CM   1. Left ankle pain, unspecified chronicity  M25.572 719.47   2. Tear of peroneal tendon of left foot  S86.312A 844.8   3. Antalgic gait  R26.89 781.2   4. History of ankle surgery  Z98.890 V45.89       Subjective Evaluation    History of Present Illness    Subjective comment: Patient reports that he has 8/10 pain today.  He states that he had no skin irritation following the iontophoresis patch and noticed a decrease in pain.Pain  Current pain ratin         Objective   See Exercise, Manual, and Modality Logs for complete treatment.       Assessment & Plan       Assessment  Assessment details: Treatment session consisted of there ex, followed by cryotherapy.  There ex focused on ankle ROM, gentle stretching, LE strengthening, and intrinsic strengthening.  Patient progressed to include increased repetitions of hip and knee strengthening exercises.  Ankle isometric repetitions reduced, per patient's request, due to increased pain.  Patient reported a decrease in pain at conclusion of today's session,noting 5/10 pain.  He will continue to be progressed per his tolerance and POC.        Timed:         Manual Therapy:         mins  40801;     Therapeutic Exercise:    42     mins  32696;     Neuromuscular Jazmin:        mins  26606;    Therapeutic Activity:          mins  29801;     Gait Training:           mins  31117;     Ultrasound:          mins  82097;    Ionto                                   mins   57092  Self Care                            mins   62254      Un-Timed:  Electrical Stimulation:         mins  71112 ( );  Dry Needling          mins self-pay  Traction          mins 32609  Canalith Repos         mins 08224  Ice-6  min  Timed Treatment:   42   mins   Total Treatment:     48   mins    Hanna Burks PT  [place] License: [id number]  Electronically signed by Hanna Burks PT, 08/15/23, 3:28 PM EDT.

## 2023-08-17 ENCOUNTER — TREATMENT (OUTPATIENT)
Dept: PHYSICAL THERAPY | Facility: CLINIC | Age: 53
End: 2023-08-17
Payer: COMMERCIAL

## 2023-08-17 DIAGNOSIS — M25.572 LEFT ANKLE PAIN, UNSPECIFIED CHRONICITY: Primary | ICD-10-CM

## 2023-08-17 DIAGNOSIS — Z98.890 HISTORY OF ANKLE SURGERY: ICD-10-CM

## 2023-08-17 DIAGNOSIS — R26.89 ANTALGIC GAIT: ICD-10-CM

## 2023-08-17 NOTE — PROGRESS NOTES
Physical Therapy Daily Treatment Note      Patient: Jaquan Mckay   : 1970  Referring practitioner: Khurram Florez DPM  Date of Initial Visit: Type: THERAPY  Noted: 2023  Today's Date: 2023  Patient seen for 6 sessions       Visit Diagnoses:    ICD-10-CM ICD-9-CM   1. Left ankle pain, unspecified chronicity  M25.572 719.47   2. Antalgic gait  R26.89 781.2   3. History of ankle surgery  Z98.890 V45.89       Subjective Evaluation    History of Present Illness    Subjective comment: Pt has been walking with a cane at the grocery store.  Pt has 7/10 pain today. Pt reports responded well to last ionto tx and has received more meds.  Pt reports having more heel pain today.     Objective   See Exercise, Manual, and Modality Logs for complete treatment.       Assessment & Plan       Assessment  Assessment details: Tx today consisted of exercised progressed with added reps per flow sheet and ended with ice and ionto for decreased edema.  Pt noted to demonstrate improved gait with use of cane today.  Pt did report heel pain during exercises today.  Pt demonstrated good effort today with reports of 4/10 post pain.    Plan  Plan details: Will follow progressing ankle mobility and stability as patient tolerates.        Timed:         Manual Therapy:         mins  91472;     Therapeutic Exercise:    28     mins  68834;     Neuromuscular Jazmin:        mins  80458;    Therapeutic Activity:     12     mins  08256;     Gait Training:           mins  67850;     Ultrasound:          mins  85063;    Ionto                               10    mins   22029(no charge)  Self Care                            mins   53395  Canalith Repos         mins 19511      Un-Timed:  Electrical Stimulation:         mins  92050 ( );  Dry Needling          mins self-pay  Traction          mins 98904      Timed Treatment:   50   mins   Total Treatment:     50   mins    Win Rodriguez PT  [place] License: [id number]      Electronically  signed by Win Rodriguez, PT, 08/17/23, 2:46 PM EDT

## 2023-08-21 ENCOUNTER — TREATMENT (OUTPATIENT)
Dept: PHYSICAL THERAPY | Facility: CLINIC | Age: 53
End: 2023-08-21
Payer: COMMERCIAL

## 2023-08-21 DIAGNOSIS — R26.89 ANTALGIC GAIT: ICD-10-CM

## 2023-08-21 DIAGNOSIS — Z98.890 HISTORY OF ANKLE SURGERY: ICD-10-CM

## 2023-08-21 DIAGNOSIS — M25.572 LEFT ANKLE PAIN, UNSPECIFIED CHRONICITY: Primary | ICD-10-CM

## 2023-08-21 NOTE — PROGRESS NOTES
Physical Therapy Daily Treatment Note      Patient: Jaquan Mckay   : 1970  Referring practitioner: Khurram Florez DPM  Date of Initial Visit: Type: THERAPY  Noted: 2023  Today's Date: 2023  Patient seen for 7 sessions       Visit Diagnoses:    ICD-10-CM ICD-9-CM   1. Left ankle pain, unspecified chronicity  M25.572 719.47   2. Antalgic gait  R26.89 781.2   3. History of ankle surgery  Z98.890 V45.89       Subjective Evaluation    History of Present Illness    Subjective comment: Patient states that he feels like his ankle is getting stronger, since he is now able to ambulate without his cane.Pain  Current pain ratin         Objective   See Exercise, Manual, and Modality Logs for complete treatment.       Assessment & Plan       Assessment  Assessment details: Therapy session consisted of there ex, therapeutic activities, iontophoresis, and cryotherapy.  Patient progressed to include the addition of standing exercises, which focused on return to functional activities.  Attempted mini squats, but patient reported pain at left lateral ankle following 1 repetition so exercise was discontinued.  Patient education provided on iontophoresis patch removal time and to monitor skin for signs of irritation, with patient verbalizing understanding.  He reported a decrease in pain to 3/10 post-tx.  Patient will continue to be progressed per his tolerance and POC.        Timed:         Manual Therapy:         mins  00510;     Therapeutic Exercise:    32     mins  46867;     Neuromuscular Jazmin:        mins  22628;    Therapeutic Activity:     12     mins  81896;     Gait Training:           mins  36455;     Ultrasound:          mins  53126;    Ionto                            10       mins   64170  Self Care                            mins   83737      Un-Timed:  Electrical Stimulation:         mins  32369 ( );  Dry Needling          mins self-pay  Traction          mins 72243  [facility]          mins 90654  Ice-6 min    Timed Treatment:   54   mins   Total Treatment:     60   mins    Hanna Burks PT  [place] License: [id number]  Electronically signed by Hanna Burks PT, 08/21/23, 5:05 PM EDT.

## 2023-08-23 ENCOUNTER — TREATMENT (OUTPATIENT)
Dept: PHYSICAL THERAPY | Facility: CLINIC | Age: 53
End: 2023-08-23
Payer: COMMERCIAL

## 2023-08-23 DIAGNOSIS — Z98.890 HISTORY OF ANKLE SURGERY: ICD-10-CM

## 2023-08-23 DIAGNOSIS — R26.89 ANTALGIC GAIT: ICD-10-CM

## 2023-08-23 DIAGNOSIS — M25.572 LEFT ANKLE PAIN, UNSPECIFIED CHRONICITY: Primary | ICD-10-CM

## 2023-08-28 ENCOUNTER — OFFICE VISIT (OUTPATIENT)
Dept: FAMILY MEDICINE CLINIC | Facility: CLINIC | Age: 53
End: 2023-08-28
Payer: COMMERCIAL

## 2023-08-28 VITALS
WEIGHT: 237 LBS | HEIGHT: 67 IN | OXYGEN SATURATION: 95 % | BODY MASS INDEX: 37.2 KG/M2 | DIASTOLIC BLOOD PRESSURE: 70 MMHG | TEMPERATURE: 97.6 F | SYSTOLIC BLOOD PRESSURE: 100 MMHG | HEART RATE: 93 BPM

## 2023-08-28 DIAGNOSIS — I10 ESSENTIAL HYPERTENSION: ICD-10-CM

## 2023-08-28 DIAGNOSIS — R94.6 ABNORMAL THYROID FUNCTION TEST: ICD-10-CM

## 2023-08-28 DIAGNOSIS — M79.672 LEFT FOOT PAIN: ICD-10-CM

## 2023-08-28 DIAGNOSIS — R53.83 OTHER FATIGUE: ICD-10-CM

## 2023-08-28 DIAGNOSIS — J45.30 MILD PERSISTENT ASTHMA WITHOUT COMPLICATION: ICD-10-CM

## 2023-08-28 DIAGNOSIS — F32.A ANXIETY AND DEPRESSION: ICD-10-CM

## 2023-08-28 DIAGNOSIS — E55.9 VITAMIN D DEFICIENCY: ICD-10-CM

## 2023-08-28 DIAGNOSIS — K21.9 GASTROESOPHAGEAL REFLUX DISEASE WITHOUT ESOPHAGITIS: ICD-10-CM

## 2023-08-28 DIAGNOSIS — Z98.890 HISTORY OF ANKLE SURGERY: Primary | ICD-10-CM

## 2023-08-28 DIAGNOSIS — F51.04 PSYCHOPHYSIOLOGICAL INSOMNIA: ICD-10-CM

## 2023-08-28 DIAGNOSIS — R56.9 SEIZURES: ICD-10-CM

## 2023-08-28 DIAGNOSIS — E66.01 CLASS 2 SEVERE OBESITY DUE TO EXCESS CALORIES WITH SERIOUS COMORBIDITY AND BODY MASS INDEX (BMI) OF 37.0 TO 37.9 IN ADULT: ICD-10-CM

## 2023-08-28 DIAGNOSIS — F41.9 ANXIETY AND DEPRESSION: ICD-10-CM

## 2023-08-28 DIAGNOSIS — I10 ESSENTIAL HYPERTENSION: Primary | ICD-10-CM

## 2023-08-28 DIAGNOSIS — E78.2 MIXED HYPERLIPIDEMIA: ICD-10-CM

## 2023-08-28 DIAGNOSIS — M62.838 MUSCLE SPASM: ICD-10-CM

## 2023-08-28 DIAGNOSIS — R21 RASH AND OTHER NONSPECIFIC SKIN ERUPTION: ICD-10-CM

## 2023-08-28 PROCEDURE — 3074F SYST BP LT 130 MM HG: CPT | Performed by: NURSE PRACTITIONER

## 2023-08-28 PROCEDURE — 99214 OFFICE O/P EST MOD 30 MIN: CPT | Performed by: NURSE PRACTITIONER

## 2023-08-28 PROCEDURE — 3078F DIAST BP <80 MM HG: CPT | Performed by: NURSE PRACTITIONER

## 2023-08-28 PROCEDURE — 1160F RVW MEDS BY RX/DR IN RCRD: CPT | Performed by: NURSE PRACTITIONER

## 2023-08-28 PROCEDURE — 1159F MED LIST DOCD IN RCRD: CPT | Performed by: NURSE PRACTITIONER

## 2023-08-28 RX ORDER — DEXLANSOPRAZOLE 60 MG/1
60 CAPSULE, DELAYED RELEASE ORAL 2 TIMES DAILY
Qty: 60 CAPSULE | Refills: 5 | Status: SHIPPED | OUTPATIENT
Start: 2023-08-28

## 2023-08-28 RX ORDER — DIPHENHYDRAMINE HCL 25 MG
25-50 TABLET ORAL EVERY 8 HOURS PRN
Qty: 120 TABLET | Refills: 2 | Status: SHIPPED | OUTPATIENT
Start: 2023-08-28

## 2023-08-28 RX ORDER — MIRTAZAPINE 30 MG/1
45 TABLET, FILM COATED ORAL NIGHTLY
Qty: 45 TABLET | Refills: 5 | Status: SHIPPED | OUTPATIENT
Start: 2023-08-28

## 2023-08-28 RX ORDER — ERGOCALCIFEROL 1.25 MG/1
50000 CAPSULE ORAL
Qty: 4 CAPSULE | Refills: 5 | Status: SHIPPED | OUTPATIENT
Start: 2023-08-28

## 2023-08-28 RX ORDER — ASPIRIN 81 MG/1
81 TABLET ORAL DAILY
Qty: 30 TABLET | Refills: 5 | Status: SHIPPED | OUTPATIENT
Start: 2023-08-28

## 2023-08-28 RX ORDER — FLUTICASONE PROPIONATE 110 UG/1
1 AEROSOL, METERED RESPIRATORY (INHALATION) 2 TIMES DAILY
Qty: 12 G | Refills: 2 | Status: SHIPPED | OUTPATIENT
Start: 2023-08-28

## 2023-08-28 RX ORDER — ACETAMINOPHEN 500 MG
500 TABLET ORAL EVERY 6 HOURS PRN
Qty: 30 TABLET | Refills: 2 | Status: SHIPPED | OUTPATIENT
Start: 2023-08-28

## 2023-08-28 RX ORDER — PHENYTOIN SODIUM 100 MG/1
200 CAPSULE, EXTENDED RELEASE ORAL 2 TIMES DAILY
Qty: 120 CAPSULE | Refills: 5 | Status: SHIPPED | OUTPATIENT
Start: 2023-08-28

## 2023-08-28 RX ORDER — ROSUVASTATIN CALCIUM 40 MG/1
40 TABLET, COATED ORAL DAILY
Qty: 60 TABLET | Refills: 3 | Status: SHIPPED | OUTPATIENT
Start: 2023-08-28

## 2023-08-28 RX ORDER — CYCLOBENZAPRINE HCL 5 MG
5-10 TABLET ORAL 3 TIMES DAILY PRN
Qty: 120 TABLET | Refills: 2 | Status: SHIPPED | OUTPATIENT
Start: 2023-08-28

## 2023-08-28 RX ORDER — ARIPIPRAZOLE 2 MG/1
2 TABLET ORAL DAILY
Qty: 30 TABLET | Refills: 1 | Status: SHIPPED | OUTPATIENT
Start: 2023-08-28

## 2023-08-28 RX ORDER — HYDROCODONE BITARTRATE AND ACETAMINOPHEN 7.5; 325 MG/1; MG/1
1 TABLET ORAL EVERY 6 HOURS PRN
Qty: 12 TABLET | Refills: 0 | Status: SHIPPED | OUTPATIENT
Start: 2023-08-28

## 2023-08-28 RX ORDER — LISINOPRIL 30 MG/1
30 TABLET ORAL DAILY
Qty: 30 TABLET | Refills: 5 | Status: SHIPPED | OUTPATIENT
Start: 2023-08-28

## 2023-08-28 NOTE — PROGRESS NOTES
"Subjective   Jaquan Mckay is a 52 y.o. male.   He plans to change pharmacies today to [facility] and [facility] as they deliver to his home.      Chief Complaint   Patient presents with    Anxiety    Depression       History of Present Illness     Anxiety and depression-on Remeron 30 mg 1.5 tablets.  He is off Restoril for several months.  He reports that he cannot seem to wind down for sleep.  He is up and down thru the night and was \"up at 5 this morning.\"   He is not drinking caffeine after 6 PM. He is trying to stay hydrated.    Left foot and ankle pain-s/p surgery.  He continues under the care of PT.  He feels his ankle is getting stronger.  He is wearing his brace today.  He reports that his boot is so worn the Velcro will not stick like it should.   He has been trying to walk more.  He continues to have some calf tightness and pain on the top of his lateral ankle bone.  Sensation in his toes and sole of foot continues to be poor.  He has been getting vials of Decadron for PT to use for inflammation.  He has completed his course of ordered Vit C.  Weight Loss observation-would like to resume weight loss.   He has been on Adipex in the past but due to his foot injury stopped as he could not actively pursue weight loss and be active.    HTN-ongoing.  On Lisinopril.  He is taking as directed.  No swelling other than his ankle.  No recent CP or palpitations.  He missed his last cardiology appt and reports he is not going back.      The following portions of the patient's history were reviewed and updated as appropriate: CC, ROS, allergies, current medications, past family history, past medical history, past social history, past surgical history and problem list.      Review of Systems   Constitutional:  Positive for fatigue. Negative for appetite change, unexpected weight gain and unexpected weight loss.   HENT:  Negative for congestion, ear pain, postnasal drip, rhinorrhea, sore throat, swollen glands, trouble swallowing " "and voice change.    Eyes:  Negative for pain and visual disturbance.   Respiratory:  Negative for cough, chest tightness, shortness of breath and wheezing.    Cardiovascular:  Negative for chest pain, palpitations and leg swelling.   Gastrointestinal:  Negative for abdominal pain, blood in stool, constipation, diarrhea, nausea, vomiting and indigestion.   Genitourinary:  Negative for dysuria, hematuria and urgency.   Musculoskeletal:  Positive for arthralgias, back pain, gait problem and myalgias. Negative for joint swelling.   Skin:  Negative for color change and skin lesions.   Allergic/Immunologic: Negative.    Neurological:  Negative for dizziness, numbness and headache.   Hematological: Negative.    Psychiatric/Behavioral:  Positive for sleep disturbance. Negative for dysphoric mood and suicidal ideas. The patient is not nervous/anxious.    All other systems reviewed and are negative.    Objective     /70   Pulse 93   Temp 97.6 øF (36.4 øC) (Temporal)   Ht 170.2 cm (67.01\")   Wt 108 kg (237 lb)   SpO2 95%   BMI 37.11 kg/mý     Physical Exam  Vitals reviewed.   Constitutional:       General: He is not in acute distress.     Appearance: He is well-developed. He is obese. He is not diaphoretic.   HENT:      Head: Normocephalic and atraumatic.      Jaw: No tenderness.      Right Ear: Hearing, tympanic membrane, ear canal and external ear normal.      Left Ear: Hearing, tympanic membrane, ear canal and external ear normal.      Nose: Nose normal. No nasal tenderness or congestion.      Right Sinus: No maxillary sinus tenderness or frontal sinus tenderness.      Left Sinus: No maxillary sinus tenderness or frontal sinus tenderness.      Mouth/Throat:      Lips: Pink.      Mouth: Mucous membranes are moist.      Pharynx: Oropharynx is clear. Uvula midline.   Eyes:      General: Lids are normal. No scleral icterus.     Extraocular Movements:      Right eye: Normal extraocular motion and no nystagmus.      " Left eye: Normal extraocular motion and no nystagmus.      Conjunctiva/sclera: Conjunctivae normal.      Pupils: Pupils are equal, round, and reactive to light.   Neck:      Thyroid: No thyromegaly or thyroid tenderness.      Vascular: No carotid bruit or JVD.      Trachea: No tracheal tenderness.   Cardiovascular:      Rate and Rhythm: Normal rate and regular rhythm.      Pulses:           Dorsalis pedis pulses are 2+ on the right side and 2+ on the left side.        Posterior tibial pulses are 2+ on the right side and 2+ on the left side.      Heart sounds: Normal heart sounds, S1 normal and S2 normal. No murmur heard.  Pulmonary:      Effort: Pulmonary effort is normal. No accessory muscle usage, prolonged expiration or respiratory distress.      Breath sounds: Normal breath sounds.   Chest:      Chest wall: No tenderness.   Abdominal:      General: Bowel sounds are normal. There is no distension.      Palpations: Abdomen is soft. There is no hepatomegaly, splenomegaly or mass.      Tenderness: There is no abdominal tenderness.   Musculoskeletal:         General: Tenderness present.      Cervical back: Normal range of motion and neck supple.      Right lower leg: No edema.      Left lower leg: No edema.      Comments: No muscular atrophy or flaccidity.  Well-healed incisions on the medial and lateral ankle region.  Mild redness from pressure of ankle brace mid scar on the lateral ankle.  No skin breakdown     Lymphadenopathy:      Head:      Right side of head: No submental or submandibular adenopathy.      Left side of head: No submental or submandibular adenopathy.      Cervical: No cervical adenopathy.      Right cervical: No superficial cervical adenopathy.     Left cervical: No superficial cervical adenopathy.   Skin:     General: Skin is warm and dry.      Capillary Refill: Capillary refill takes less than 2 seconds.      Coloration: Skin is not jaundiced or pale.      Findings: No erythema.      Nails:  There is no clubbing.   Neurological:      Mental Status: He is alert and oriented to person, place, and time.      Cranial Nerves: No cranial nerve deficit or facial asymmetry.      Sensory: No sensory deficit.      Motor: No weakness, tremor, atrophy or abnormal muscle tone.      Coordination: Coordination normal.      Gait: Gait abnormal (moderate antalgia).      Deep Tendon Reflexes: Reflexes are normal and symmetric.   Psychiatric:         Attention and Perception: He is attentive.         Mood and Affect: Mood normal. Mood is depressed. Mood is not anxious.         Speech: Speech normal.         Behavior: Behavior normal. Behavior is cooperative.         Thought Content: Thought content normal.         Cognition and Memory: Cognition normal.         Judgment: Judgment normal.       Diagnoses and all orders for this visit:    1. History of ankle surgery (Primary)  Comments:  continue under the care of ortho  Advised to pad area of tenderness on ankle scar  Orders:  -     HYDROcodone-acetaminophen (Norco) 7.5-325 MG per tablet; Take 1 tablet by mouth Every 6 (Six) Hours As Needed for Moderate Pain.  Dispense: 12 tablet; Refill: 0    2. Psychophysiological insomnia  Overview:  With depression and anxiety      Assessment & Plan:  Continue Remeron  Continue to work on sleep Hygiene.      3. Essential hypertension  Assessment & Plan:  Continue lisinopril 30 mg.  Continue under the care of cardiology.  Ambulatory BP monitoring either at home or random community checks.  Patient to report continued elevations >140/90.  Patient may come by office for checks if needed.   Suspect elevation today is due to his acute pain status.  Patient will continue to monitor blood pressure at home and report sustained elevations      4. Left foot pain  Comments:  Continue under the care of orthopedic podiatry  Orders:  -     HYDROcodone-acetaminophen (Norco) 7.5-325 MG per tablet; Take 1 tablet by mouth Every 6 (Six) Hours As Needed  for Moderate Pain.  Dispense: 12 tablet; Refill: 0        Understands disease processes and need for medications.  Understands reasons for urgent and emergent care.  Patient (& family) verbalized agreement for treatment plan.   Emotional support and active listening provided.  Patient provided time to verbalize feelings.    CHAVEZ/PMDP reviewed today and consistent.  Will refill prescribed controlled medication today.  Patient is aware they cannot receive narcotics from any other provider except if under care of pain management or speciality clinic.  Risk and benefits of medication use has been reviewed.  History and physical exam exhibit continued safe and appropriate use of controlled substances.  The patient is aware of the potential for addiction and dependence.  This patient has been made aware of the appropriate use of such medications, including potential risk of somnolence, limited ability to drive and / or work safely, and potential for overdose.    It has also been made clear that these medications are for use by this patient only, without concomitant use of alcohol or other substances unless prescribed/advised by medical provider.  Patient understands they may be subject to UDS and pill counts at random.      Patient considered to be low risk for addiction due to use of single controlled medications.  Patient understands and accepts these risks.  Patient need for medication will be reassessed at each visit.  Doses will be adjusted according to patient need and findings.    Goal of TX: Patient will not have any adverse reactions of medication.  Patient have reduction in pain symptoms with use of PRN Norco as directed.  Patient will not have any adverse side effects from medication.  Patient will be able to remain active in an outside of home without interference from pain symptoms.    RTC 1 month, sooner if needed.     Will plan for updated labs.           This document has been electronically signed by:   BALDOMERO Thao, FNP-C    Dragon disclaimer:  Part of this note may be an electronic transcription/translation of spoken language to printed text using the Dragon Dictation System.

## 2023-08-29 ENCOUNTER — TREATMENT (OUTPATIENT)
Dept: PHYSICAL THERAPY | Facility: CLINIC | Age: 53
End: 2023-08-29
Payer: COMMERCIAL

## 2023-08-29 DIAGNOSIS — Z98.890 HISTORY OF ANKLE SURGERY: ICD-10-CM

## 2023-08-29 DIAGNOSIS — M25.572 LEFT ANKLE PAIN, UNSPECIFIED CHRONICITY: Primary | ICD-10-CM

## 2023-08-29 DIAGNOSIS — R26.89 ANTALGIC GAIT: ICD-10-CM
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
## 2023-08-31 ENCOUNTER — TREATMENT (OUTPATIENT)
Dept: PHYSICAL THERAPY | Facility: CLINIC | Age: 53
End: 2023-08-31
Payer: COMMERCIAL

## 2023-08-31 DIAGNOSIS — R26.89 ANTALGIC GAIT: ICD-10-CM

## 2023-08-31 DIAGNOSIS — Z98.890 HISTORY OF ANKLE SURGERY: ICD-10-CM

## 2023-08-31 DIAGNOSIS — M25.572 LEFT ANKLE PAIN, UNSPECIFIED CHRONICITY: Primary | ICD-10-CM

## 2023-08-31 NOTE — PROGRESS NOTES
Physical Therapy Daily Treatment Note      Patient: Jaquan Mckay   : 1970  Referring practitioner: Khurram Florez DPM  Date of Initial Visit: Type: THERAPY  Noted: 2023  Today's Date: 2023  Patient seen for 10 sessions       Visit Diagnoses:    ICD-10-CM ICD-9-CM   1. Left ankle pain, unspecified chronicity  M25.572 719.47   2. History of ankle surgery  Z98.890 V45.89   3. Antalgic gait  R26.89 781.2       Subjective Evaluation    History of Present Illness    Subjective comment: Pt has 6/10 pain today. Pt was evaluated by his MD yesterday and may receive a different brace next visit. The MD was pleased with his progress.     Objective   See Exercise, Manual, and Modality Logs for complete treatment.       Assessment & Plan       Assessment  Assessment details: Tx today consisted of foot and ankle exercises in supine and sitting; followed by standing exercises and proprioceptive training and ended with ionto for decreased edema. Pt demonstrated good effort with 4/10 post pain.    Plan  Plan details: Will follow progressing ankle stability and decreased pain for improved home ADLs.        Timed:         Manual Therapy:         mins  69792;     Therapeutic Exercise:    28     mins  75419;     Neuromuscular Jazmin:    11    mins  60633;    Therapeutic Activity:          mins  56437;     Gait Training:           mins  01179;     Ultrasound:          mins  67093;    Ionto                               10    mins   99939  Self Care                            mins   48272  Canalith Repos         mins 92455      Un-Timed:  Electrical Stimulation:         mins  01605 (MC );  Dry Needling          mins self-pay  Traction          mins 55123      Timed Treatment:   49   mins   Total Treatment:     49   mins    Win Rodriguez PT  [place] License: [id number]      Electronically signed by Win Rodriguez PT, 23, 2:49 PM EDT

## 2023-09-01 ENCOUNTER — LAB (OUTPATIENT)
Dept: LAB | Facility: HOSPITAL | Age: 53
End: 2023-09-01
Payer: COMMERCIAL

## 2023-09-01 ENCOUNTER — TELEPHONE (OUTPATIENT)
Dept: PHYSICAL THERAPY | Facility: CLINIC | Age: 53
End: 2023-09-01
Payer: COMMERCIAL

## 2023-09-01 LAB
25(OH)D3 SERPL-MCNC: 46.6 NG/ML (ref 30–100)
ALBUMIN SERPL-MCNC: 4 G/DL (ref 3.5–5.2)
ALBUMIN/GLOB SERPL: 1.3 G/DL
ALP SERPL-CCNC: 83 U/L (ref 39–117)
ALT SERPL W P-5'-P-CCNC: 24 U/L (ref 1–41)
ANION GAP SERPL CALCULATED.3IONS-SCNC: 9 MMOL/L (ref 5–15)
AST SERPL-CCNC: 25 U/L (ref 1–40)
BASOPHILS # BLD AUTO: 0.02 10*3/MM3 (ref 0–0.2)
BASOPHILS NFR BLD AUTO: 0.4 % (ref 0–1.5)
BILIRUB SERPL-MCNC: 0.2 MG/DL (ref 0–1.2)
BUN SERPL-MCNC: 18 MG/DL (ref 6–20)
BUN/CREAT SERPL: 20.2 (ref 7–25)
CALCIUM SPEC-SCNC: 8.8 MG/DL (ref 8.6–10.5)
CHLORIDE SERPL-SCNC: 105 MMOL/L (ref 98–107)
CHOLEST SERPL-MCNC: 184 MG/DL (ref 0–200)
CO2 SERPL-SCNC: 20 MMOL/L (ref 22–29)
CREAT SERPL-MCNC: 0.89 MG/DL (ref 0.76–1.27)
DEPRECATED RDW RBC AUTO: 41.6 FL (ref 37–54)
EGFRCR SERPLBLD CKD-EPI 2021: 104.4 ML/MIN/1.73
EOSINOPHIL # BLD AUTO: 0.1 10*3/MM3 (ref 0–0.4)
EOSINOPHIL NFR BLD AUTO: 1.9 % (ref 0.3–6.2)
ERYTHROCYTE [DISTWIDTH] IN BLOOD BY AUTOMATED COUNT: 12.6 % (ref 12.3–15.4)
FERRITIN SERPL-MCNC: 42.9 NG/ML (ref 30–400)
GLOBULIN UR ELPH-MCNC: 3 GM/DL
GLUCOSE SERPL-MCNC: 99 MG/DL (ref 65–99)
HBA1C MFR BLD: 4.9 % (ref 4.8–5.6)
HCT VFR BLD AUTO: 40.9 % (ref 37.5–51)
HDLC SERPL-MCNC: 65 MG/DL (ref 40–60)
HGB BLD-MCNC: 14.8 G/DL (ref 13–17.7)
IMM GRANULOCYTES # BLD AUTO: 0.01 10*3/MM3 (ref 0–0.05)
IMM GRANULOCYTES NFR BLD AUTO: 0.2 % (ref 0–0.5)
IRON 24H UR-MRATE: 99 MCG/DL (ref 59–158)
IRON SATN MFR SERPL: 38 % (ref 20–50)
LDLC SERPL CALC-MCNC: 106 MG/DL (ref 0–100)
LDLC/HDLC SERPL: 1.61 {RATIO}
LYMPHOCYTES # BLD AUTO: 1.69 10*3/MM3 (ref 0.7–3.1)
LYMPHOCYTES NFR BLD AUTO: 31.4 % (ref 19.6–45.3)
MCH RBC QN AUTO: 33.3 PG (ref 26.6–33)
MCHC RBC AUTO-ENTMCNC: 36.2 G/DL (ref 31.5–35.7)
MCV RBC AUTO: 92.1 FL (ref 79–97)
MONOCYTES # BLD AUTO: 0.72 10*3/MM3 (ref 0.1–0.9)
MONOCYTES NFR BLD AUTO: 13.4 % (ref 5–12)
NEUTROPHILS NFR BLD AUTO: 2.84 10*3/MM3 (ref 1.7–7)
NEUTROPHILS NFR BLD AUTO: 52.7 % (ref 42.7–76)
NRBC BLD AUTO-RTO: 0 /100 WBC (ref 0–0.2)
PHENYTOIN SERPL-MCNC: 13.9 MCG/ML (ref 10–20)
PLATELET # BLD AUTO: 170 10*3/MM3 (ref 140–450)
PMV BLD AUTO: 11.6 FL (ref 6–12)
POTASSIUM SERPL-SCNC: 4.4 MMOL/L (ref 3.5–5.2)
PROT SERPL-MCNC: 7 G/DL (ref 6–8.5)
RBC # BLD AUTO: 4.44 10*6/MM3 (ref 4.14–5.8)
SODIUM SERPL-SCNC: 134 MMOL/L (ref 136–145)
T4 FREE SERPL-MCNC: 0.9 NG/DL (ref 0.93–1.7)
TIBC SERPL-MCNC: 261 MCG/DL (ref 298–536)
TRANSFERRIN SERPL-MCNC: 175 MG/DL (ref 200–360)
TRIGL SERPL-MCNC: 71 MG/DL (ref 0–150)
TSH SERPL DL<=0.05 MIU/L-ACNC: 1.66 UIU/ML (ref 0.27–4.2)
VIT B12 BLD-MCNC: 485 PG/ML (ref 211–946)
VLDLC SERPL-MCNC: 13 MG/DL (ref 5–40)
WBC NRBC COR # BLD: 5.38 10*3/MM3 (ref 3.4–10.8)

## 2023-09-01 PROCEDURE — 85025 COMPLETE CBC W/AUTO DIFF WBC: CPT | Performed by: NURSE PRACTITIONER

## 2023-09-01 PROCEDURE — 83525 ASSAY OF INSULIN: CPT | Performed by: NURSE PRACTITIONER

## 2023-09-01 PROCEDURE — 80186 ASSAY OF PHENYTOIN FREE: CPT | Performed by: NURSE PRACTITIONER

## 2023-09-01 PROCEDURE — 80061 LIPID PANEL: CPT | Performed by: NURSE PRACTITIONER

## 2023-09-01 PROCEDURE — 82607 VITAMIN B-12: CPT | Performed by: NURSE PRACTITIONER

## 2023-09-01 PROCEDURE — 82728 ASSAY OF FERRITIN: CPT | Performed by: NURSE PRACTITIONER

## 2023-09-01 PROCEDURE — 36415 COLL VENOUS BLD VENIPUNCTURE: CPT | Performed by: NURSE PRACTITIONER

## 2023-09-01 PROCEDURE — 82306 VITAMIN D 25 HYDROXY: CPT | Performed by: NURSE PRACTITIONER

## 2023-09-01 PROCEDURE — 83527 ASSAY OF INSULIN: CPT | Performed by: NURSE PRACTITIONER

## 2023-09-01 PROCEDURE — 84439 ASSAY OF FREE THYROXINE: CPT | Performed by: NURSE PRACTITIONER

## 2023-09-01 PROCEDURE — 83036 HEMOGLOBIN GLYCOSYLATED A1C: CPT | Performed by: NURSE PRACTITIONER

## 2023-09-01 PROCEDURE — 83540 ASSAY OF IRON: CPT | Performed by: NURSE PRACTITIONER

## 2023-09-01 PROCEDURE — 80053 COMPREHEN METABOLIC PANEL: CPT | Performed by: NURSE PRACTITIONER

## 2023-09-01 PROCEDURE — 84466 ASSAY OF TRANSFERRIN: CPT | Performed by: NURSE PRACTITIONER

## 2023-09-01 PROCEDURE — 80185 ASSAY OF PHENYTOIN TOTAL: CPT | Performed by: NURSE PRACTITIONER

## 2023-09-01 PROCEDURE — 84443 ASSAY THYROID STIM HORMONE: CPT | Performed by: NURSE PRACTITIONER

## 2023-09-04 ENCOUNTER — HOSPITAL ENCOUNTER (EMERGENCY)
Facility: HOSPITAL | Age: 53
Discharge: HOME | End: 2023-09-04
Attending: STUDENT IN AN ORGANIZED HEALTH CARE EDUCATION/TRAINING PROGRAM | Admitting: STUDENT IN AN ORGANIZED HEALTH CARE EDUCATION/TRAINING PROGRAM
Payer: COMMERCIAL

## 2023-09-04 ENCOUNTER — APPOINTMENT (OUTPATIENT)
Dept: GENERAL RADIOLOGY | Facility: HOSPITAL | Age: 53
End: 2023-09-04
Payer: COMMERCIAL

## 2023-09-04 VITALS
TEMPERATURE: 98.1 F | WEIGHT: 237 LBS | RESPIRATION RATE: 20 BRPM | HEIGHT: 67 IN | SYSTOLIC BLOOD PRESSURE: 124 MMHG | DIASTOLIC BLOOD PRESSURE: 75 MMHG | BODY MASS INDEX: 37.2 KG/M2 | OXYGEN SATURATION: 99 % | HEART RATE: 94 BPM

## 2023-09-04 DIAGNOSIS — U07.1 COVID-19: Primary | ICD-10-CM

## 2023-09-04 LAB
ALBUMIN SERPL-MCNC: 4.2 G/DL (ref 3.5–5.2)
ALBUMIN/GLOB SERPL: 1.2 G/DL
ALP SERPL-CCNC: 102 U/L (ref 39–117)
ALT SERPL W P-5'-P-CCNC: 25 U/L (ref 1–41)
ANION GAP SERPL CALCULATED.3IONS-SCNC: 12.2 MMOL/L (ref 5–15)
AST SERPL-CCNC: 24 U/L (ref 1–40)
BILIRUB SERPL-MCNC: 0.3 MG/DL (ref 0–1.2)
BUN SERPL-MCNC: 12 MG/DL (ref 6–20)
BUN/CREAT SERPL: 12.2 (ref 7–25)
CALCIUM SPEC-SCNC: 9.6 MG/DL (ref 8.6–10.5)
CHLORIDE SERPL-SCNC: 104 MMOL/L (ref 98–107)
CO2 SERPL-SCNC: 19.8 MMOL/L (ref 22–29)
CREAT SERPL-MCNC: 0.98 MG/DL (ref 0.76–1.27)
CRP SERPL-MCNC: 3.83 MG/DL (ref 0–0.5)
DEPRECATED RDW RBC AUTO: 44.2 FL (ref 37–54)
EGFRCR SERPLBLD CKD-EPI 2021: 93.9 ML/MIN/1.73
ERYTHROCYTE [DISTWIDTH] IN BLOOD BY AUTOMATED COUNT: 12.7 % (ref 12.3–15.4)
FLUAV RNA RESP QL NAA+PROBE: NOT DETECTED
FLUBV RNA ISLT QL NAA+PROBE: NOT DETECTED
GLOBULIN UR ELPH-MCNC: 3.6 GM/DL
GLUCOSE SERPL-MCNC: 106 MG/DL (ref 65–99)
HCT VFR BLD AUTO: 45 % (ref 37.5–51)
HGB BLD-MCNC: 15.7 G/DL (ref 13–17.7)
HOLD SPECIMEN: NORMAL
HOLD SPECIMEN: NORMAL
LYMPHOCYTES # BLD MANUAL: 0.85 10*3/MM3 (ref 0.7–3.1)
LYMPHOCYTES NFR BLD MANUAL: 20 % (ref 5–12)
MCH RBC QN AUTO: 32.7 PG (ref 26.6–33)
MCHC RBC AUTO-ENTMCNC: 34.9 G/DL (ref 31.5–35.7)
MCV RBC AUTO: 93.8 FL (ref 79–97)
MONOCYTES # BLD: 1.42 10*3/MM3 (ref 0.1–0.9)
NEUTROPHILS # BLD AUTO: 4.83 10*3/MM3 (ref 1.7–7)
NEUTROPHILS NFR BLD MANUAL: 67 % (ref 42.7–76)
NEUTS BAND NFR BLD MANUAL: 1 % (ref 0–5)
PHENYTOIN FREE SERPL-MCNC: 1.2 UG/ML (ref 1–2)
PLAT MORPH BLD: NORMAL
PLATELET # BLD AUTO: 150 10*3/MM3 (ref 140–450)
PMV BLD AUTO: 9.6 FL (ref 6–12)
POTASSIUM SERPL-SCNC: 4.1 MMOL/L (ref 3.5–5.2)
PROT SERPL-MCNC: 7.8 G/DL (ref 6–8.5)
RBC # BLD AUTO: 4.8 10*6/MM3 (ref 4.14–5.8)
RBC MORPH BLD: NORMAL
SARS-COV-2 RNA RESP QL NAA+PROBE: DETECTED
SODIUM SERPL-SCNC: 136 MMOL/L (ref 136–145)
VARIANT LYMPHS NFR BLD MANUAL: 12 % (ref 19.6–45.3)
WBC NRBC COR # BLD: 7.1 10*3/MM3 (ref 3.4–10.8)
WHOLE BLOOD HOLD COAG: NORMAL
WHOLE BLOOD HOLD SPECIMEN: NORMAL

## 2023-09-04 PROCEDURE — 96372 THER/PROPH/DIAG INJ SC/IM: CPT

## 2023-09-04 PROCEDURE — 86140 C-REACTIVE PROTEIN: CPT | Performed by: PHYSICIAN ASSISTANT

## 2023-09-04 PROCEDURE — 87636 SARSCOV2 & INF A&B AMP PRB: CPT | Performed by: PHYSICIAN ASSISTANT

## 2023-09-04 PROCEDURE — 71045 X-RAY EXAM CHEST 1 VIEW: CPT

## 2023-09-04 PROCEDURE — 85007 BL SMEAR W/DIFF WBC COUNT: CPT | Performed by: PHYSICIAN ASSISTANT

## 2023-09-04 PROCEDURE — 80053 COMPREHEN METABOLIC PANEL: CPT | Performed by: PHYSICIAN ASSISTANT

## 2023-09-04 PROCEDURE — 25010000002 DEXAMETHASONE SODIUM PHOSPHATE 10 MG/ML SOLUTION: Performed by: PHYSICIAN ASSISTANT

## 2023-09-04 PROCEDURE — 71045 X-RAY EXAM CHEST 1 VIEW: CPT | Performed by: RADIOLOGY

## 2023-09-04 PROCEDURE — 99283 EMERGENCY DEPT VISIT LOW MDM: CPT

## 2023-09-04 PROCEDURE — 36415 COLL VENOUS BLD VENIPUNCTURE: CPT

## 2023-09-04 PROCEDURE — 85025 COMPLETE CBC W/AUTO DIFF WBC: CPT | Performed by: PHYSICIAN ASSISTANT

## 2023-09-04 RX ORDER — DEXAMETHASONE 6 MG/1
6 TABLET ORAL
Qty: 6 TABLET | Refills: 0 | Status: SHIPPED | OUTPATIENT
Start: 2023-09-04

## 2023-09-04 RX ORDER — DEXAMETHASONE SODIUM PHOSPHATE 10 MG/ML
6 INJECTION, SOLUTION INTRAMUSCULAR; INTRAVENOUS ONCE
Status: COMPLETED | OUTPATIENT
Start: 2023-09-04 | End: 2023-09-04

## 2023-09-04 RX ADMIN — DEXAMETHASONE SODIUM PHOSPHATE 6 MG: 10 INJECTION INTRAMUSCULAR; INTRAVENOUS at 14:54

## 2023-09-04 NOTE — ED PROVIDER NOTES
"Subjective   History of Present Illness  52-year-old male with past medical history of allergies, anxiety, arthritis, asthma, clotting disorder, coronary artery disease, depression, hypercholesterolemia, peptic ulcer disease, GERD, migraines, sleep apnea, seizures, and Carl Mountain spotted fever presents to the emergency room with coughing, congestion, sneezing, and generalized weakness which has been ongoing for the past 10 days.  Patient states the Thursday before last he went to his allergist and got allergy shots and began feeling bad that evening.  He states he developed sneezing, nonproductive cough, and nasal congestion at that time and began feeling worse yesterday.  He does state that his  at [facility] was recently diagnosed and admitted for COVID-19 and thinks he may potentially have it.  Denies any other complaints or concerns at this time.    History provided by:  Patient   used: No      Review of Systems   Constitutional:  Positive for fatigue. Negative for fever.   HENT: Negative.     Respiratory: Negative.     Cardiovascular: Negative.  Negative for chest pain.   Gastrointestinal: Negative.  Negative for abdominal pain.   Endocrine: Negative.    Genitourinary: Negative.  Negative for dysuria.   Skin: Negative.    Neurological: Negative.    Psychiatric/Behavioral: Negative.     All other systems reviewed and are negative.    Past Medical History:   Diagnosis Date    Allergic     Anxiety     Arthritis     Asthma     Body piercing     REPORTS CYLICONE IN EARS    Clotting disorder 2004    had a knee surgery    Coronary artery disease     Depression     DVT (deep venous thrombosis)     RIGHT RIGHT KNEE AFTER SURGERY YEARS AGO IN 2001 OR 2004    Elevated cholesterol     Gastric ulcer     GERD (gastroesophageal reflux disease)     H/O migraine     Headache     Heart attack     REPORTS \"LIGHT HEART ATTACK A LONG TIME AGO\"  \"EARLY 90'S\"    History of seizures     REPORTS LAST EPISODE " "WAS AROUND 1995.    Hostility     Hyperlipidemia     Hypertension     Knee pain, acute     Left    Low back pain     Lyme disease     Migraine     MRSA (methicillin resistant Staphylococcus aureus)     REPORTS LAST TESTED + 2004. WAS TREATED HE REPORTS.  RIGHT ARM, RIGHT KNEE.    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     Obesity     Poor historian     Carl Mountain spotted fever     Seizures     Sleep apnea     Tattoo     Wears glasses        Allergies   Allergen Reactions    Ciprofloxacin Anaphylaxis and Hives    Miralax [Polyethylene Glycol] Itching and Rash    Mobic [Meloxicam] Other (See Comments)     Pt states, \"It make my feet and hands go numb and I can't hardly walk.\"     Paxil [Paroxetine Hcl] Shortness Of Breath     Chest pain     Peanut-Containing Drug Products Anaphylaxis    Penicillins Anaphylaxis    Pristiq [Desvenlafaxine Succinate Er] Dizziness    Sulfa Antibiotics Anaphylaxis, Itching and Rash    Doxycycline Hives    Fish-Derived Products Hives    Isosorbide Nitrate Rash     Rash, hives, had to use inhaler.     Movantik [Naloxegol] Rash    Seroquel [Quetiapine] Hives and Rash    Buspar [Buspirone] Rash    Clarithromycin Rash    Clindamycin/Lincomycin Rash    Codeine Rash    Contrast Dye (Echo Or Unknown Ct/Mr) Itching and Rash    Diltiazem Rash    Flomax [Tamsulosin] Hives    Gabapentin Rash    Iodinated Contrast Media Itching and Rash    Keflex [Cephalexin] Rash    Levocetirizine Rash    Linzess [Linaclotide] Rash    Metoprolol Rash    Prednisone Rash and Other (See Comments)     Face, feet, and legs go completely numb per patient    Robitussin Cough+ Chest Max St [Dextromethorphan-Guaifenesin] Itching    Shrimp (Diagnostic) Rash    Spironolactone Rash    Viibryd [Vilazodone Hcl] Itching and Rash    Zoloft [Sertraline Hcl] Hives and Itching       Past Surgical History:   Procedure Laterality Date    ABDOMINAL SURGERY      BACK SURGERY      BRAIN SURGERY  1986    Tumor removal     CARDIAC CATHETERIZATION N/A 9/28/2018 "    Procedure: Left Heart Cath;  Surgeon: Leandro Daily MD;  Location: [facility] CATH INVASIVE LOCATION;  Service: Cardiology    CHOLECYSTECTOMY      COLONOSCOPY      COLONOSCOPY N/A 8/2/2021    Procedure: COLONOSCOPY FOR SCREENING;  Surgeon: Irving Azar MD;  Location: [facility] OR;  Service: Gastroenterology;  Laterality: N/A;    CYST REMOVAL      pilonidal cyst    ELBOW EPICONDYLECTOMY Right 7/23/2020    Procedure: LATERAL EPICONDYLAR RELEASE;  Surgeon: Jose Ruiz MD;  Location: [facility] OR;  Service: Orthopedics;  Laterality: Right;    ENDOSCOPY      ENDOSCOPY N/A 8/2/2021    Procedure: ESOPHAGOGASTRODUODENOSCOPY WITH BIOPSY;  Surgeon: Irving Azar MD;  Location: [facility] OR;  Service: Gastroenterology;  Laterality: N/A;  esophageal dilatation to 20mm    FRACTURE SURGERY Right     elbow    KNEE ARTHROSCOPY Left 10/20/2017    Procedure: Diagnostic arthroscopy left knee with chondroplasty;  Surgeon: Marco Aguirre MD;  Location: [facility] OR;  Service:     KNEE ARTHROSCOPY Left 1/11/2021    Procedure: KNEE DIAGNOSTIC ARTHROSCOPY WITH  CHONDROPLASTY patella, femoral and medial;  Surgeon: Raul Eagle MD;  Location: [facility] OR;  Service: Orthopedics;  Laterality: Left;    KNEE SURGERY Right     MOUTH SURGERY      FULL MOUTH EXTRACTION    OTHER SURGICAL HISTORY      REPORTS 7 TICKS REMOVED FROM RIGHT ARM IN 2001 OR 2002    TENNIS ELBOW RELEASE Right 7/23/2020    Procedure: RIGHT TENNIS ELBOW RELEASE;  Surgeon: Jose Ruiz MD;  Location: [facility] OR;  Service: Orthopedics;  Laterality: Right;    TUMOR EXCISION      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       Family History   Problem Relation Age of Onset    Diabetes Mother     Hypertension Mother     Stroke Mother     Diabetes Father     Skin cancer Father     Hypertension Father     Heart attack Father     Diabetes Brother     Hypertension Brother     Heart disease Maternal Aunt     Heart disease Maternal Uncle      Heart disease Paternal Aunt     Heart disease Paternal Uncle     Heart disease Maternal Grandmother     Heart disease Maternal Grandfather     Heart disease Paternal Grandmother     Heart disease Paternal Grandfather        Social History     Socioeconomic History    Marital status:      Spouse name: Becca    Number of children: 2    Years of education: 12   Tobacco Use    Smoking status: Some Days     Packs/day: 0.25     Years: 17.00     Pack years: 4.25     Types: Cigars, Cigarettes     Start date: 4/11/2022     Passive exposure: Current    Smokeless tobacco: Never   Vaping Use    Vaping Use: Never used   Substance and Sexual Activity    Alcohol use: No    Drug use: No    Sexual activity: Defer     Partners: Female     Birth control/protection: None           Objective   Physical Exam  Vitals and nursing note reviewed.   Constitutional:       General: He is not in acute distress.     Appearance: He is well-developed. He is not diaphoretic.   HENT:      Head: Normocephalic and atraumatic.      Right Ear: External ear normal.      Left Ear: External ear normal.      Nose: Nose normal.   Eyes:      Conjunctiva/sclera: Conjunctivae normal.      Pupils: Pupils are equal, round, and reactive to light.   Neck:      Vascular: No JVD.      Trachea: No tracheal deviation.   Cardiovascular:      Rate and Rhythm: Normal rate and regular rhythm.      Heart sounds: Normal heart sounds. No murmur heard.  Pulmonary:      Effort: Pulmonary effort is normal. No respiratory distress.      Breath sounds: Normal breath sounds. No wheezing.   Abdominal:      General: Bowel sounds are normal.      Palpations: Abdomen is soft.      Tenderness: There is no abdominal tenderness.   Musculoskeletal:         General: No deformity. Normal range of motion.      Cervical back: Normal range of motion and neck supple.   Skin:     General: Skin is warm and dry.      Coloration: Skin is not pale.      Findings: No erythema or rash.    Neurological:      Mental Status: He is alert and oriented to person, place, and time.      Cranial Nerves: No cranial nerve deficit.   Psychiatric:         Behavior: Behavior normal.         Thought Content: Thought content normal.       Procedures           ED Course  ED Course as of 09/04/23 1531   Mon Sep 04, 2023   1344 COVID19(!!): Detected [TK]   1445 XR Chest 1 View [TK]      ED Course User Index  [TK] Alanna Rogers PA-C           Results for orders placed or performed during the hospital encounter of 09/04/23   COVID-19 and FLU A/B PCR - Swab, Nasopharynx    Specimen: Nasopharynx; Swab   Result Value Ref Range    COVID19 Detected (C) Not Detected - Ref. Range    Influenza A PCR Not Detected Not Detected    Influenza B PCR Not Detected Not Detected   Comprehensive Metabolic Panel    Specimen: Arm, Right; Blood   Result Value Ref Range    Glucose 106 (H) 65 - 99 mg/dL    BUN 12 6 - 20 mg/dL    Creatinine 0.98 0.76 - 1.27 mg/dL    Sodium 136 136 - 145 mmol/L    Potassium 4.1 3.5 - 5.2 mmol/L    Chloride 104 98 - 107 mmol/L    CO2 19.8 (L) 22.0 - 29.0 mmol/L    Calcium 9.6 8.6 - 10.5 mg/dL    Total Protein 7.8 6.0 - 8.5 g/dL    Albumin 4.2 3.5 - 5.2 g/dL    ALT (SGPT) 25 1 - 41 U/L    AST (SGOT) 24 1 - 40 U/L    Alkaline Phosphatase 102 39 - 117 U/L    Total Bilirubin 0.3 0.0 - 1.2 mg/dL    Globulin 3.6 gm/dL    A/G Ratio 1.2 g/dL    BUN/Creatinine Ratio 12.2 7.0 - 25.0    Anion Gap 12.2 5.0 - 15.0 mmol/L    eGFR 93.9 >60.0 mL/min/1.73   C-reactive Protein    Specimen: Arm, Right; Blood   Result Value Ref Range    C-Reactive Protein 3.83 (H) 0.00 - 0.50 mg/dL   CBC Auto Differential    Specimen: Arm, Right; Blood   Result Value Ref Range    WBC 7.10 3.40 - 10.80 10*3/mm3    RBC 4.80 4.14 - 5.80 10*6/mm3    Hemoglobin 15.7 13.0 - 17.7 g/dL    Hematocrit 45.0 37.5 - 51.0 %    MCV 93.8 79.0 - 97.0 fL    MCH 32.7 26.6 - 33.0 pg    MCHC 34.9 31.5 - 35.7 g/dL    RDW 12.7 12.3 - 15.4 %    RDW-SD 44.2 37.0 -  54.0 fl    MPV 9.6 6.0 - 12.0 fL    Platelets 150 140 - 450 10*3/mm3   Manual Differential    Specimen: Arm, Right; Blood   Result Value Ref Range    Neutrophil % 67.0 42.7 - 76.0 %    Lymphocyte % 12.0 (L) 19.6 - 45.3 %    Monocyte % 20.0 (H) 5.0 - 12.0 %    Bands %  1.0 0.0 - 5.0 %    Neutrophils Absolute 4.83 1.70 - 7.00 10*3/mm3    Lymphocytes Absolute 0.85 0.70 - 3.10 10*3/mm3    Monocytes Absolute 1.42 (H) 0.10 - 0.90 10*3/mm3    RBC Morphology Normal Normal    Platelet Morphology Normal Normal   Green Top (Gel)   Result Value Ref Range    Extra Tube Hold for add-ons.    Lavender Top   Result Value Ref Range    Extra Tube hold for add-on    Gold Top - SST   Result Value Ref Range    Extra Tube Hold for add-ons.    Light Blue Top   Result Value Ref Range    Extra Tube Hold for add-ons.      *Note: Due to a large number of results and/or encounters for the requested time period, some results have not been displayed. A complete set of results can be found in Results Review.       XR Chest 1 View   Final Result       No evidence of acute disease in the chest.       This report was finalized on 9/4/2023 2:12 PM by Myrna Paul MD.                                              Medical Decision Making  52-year-old male with past medical history of allergies, anxiety, arthritis, asthma, clotting disorder, coronary artery disease, depression, hypercholesterolemia, peptic ulcer disease, GERD, migraines, sleep apnea, seizures, and Carl Mountain spotted fever presents to the emergency room with coughing, congestion, sneezing, and generalized weakness which has been ongoing for the past 10 days.  Patient states the Thursday before last he went to his allergist and got allergy shots and began feeling bad that evening.  He states he developed sneezing, nonproductive cough, and nasal congestion at that time and began feeling worse yesterday.  He does state that his  at [facility] was recently diagnosed and admitted for  COVID-19 and thinks he may potentially have it.  Denies any other complaints or concerns at this time.      Problems Addressed:  COVID-19: complicated acute illness or injury    Amount and/or Complexity of Data Reviewed  Labs: ordered. Decision-making details documented in ED Course.  Radiology: ordered. Decision-making details documented in ED Course.    Risk  Prescription drug management.        Final diagnoses:   COVID-19       ED Disposition  ED Disposition       ED Disposition   Discharge    Condition   Stable    Comment   --               Tiera Valenzuela, APRN  [address] [facility] [address] [phone]    In 2 days           Medication List        New Prescriptions      dexAMETHasone 6 MG tablet  Commonly known as: DECADRON  Take 1 tablet by mouth Daily With Breakfast.               Where to Get Your Medications        These medications were sent to [facility] [place] - [address] - [phone]  - [phone]   [address] [place] [address]      Phone: [phone]   dexAMETHasone 6 MG tablet            Alanna Rogers PARaheemC  09/04/23 1537

## 2023-09-09 LAB
INSULIN FREE SERPL-ACNC: 6.7 UU/ML
INSULIN SERPL-ACNC: 6.7 UU/ML

## 2023-09-12 ENCOUNTER — TREATMENT (OUTPATIENT)
Dept: PHYSICAL THERAPY | Facility: CLINIC | Age: 53
End: 2023-09-12
Payer: COMMERCIAL

## 2023-09-12 DIAGNOSIS — M25.572 LEFT ANKLE PAIN, UNSPECIFIED CHRONICITY: Primary | ICD-10-CM

## 2023-09-12 DIAGNOSIS — Z98.890 HISTORY OF ANKLE SURGERY: ICD-10-CM

## 2023-09-12 DIAGNOSIS — R26.89 ANTALGIC GAIT: ICD-10-CM

## 2023-09-12 NOTE — PROGRESS NOTES
Kidney and liver function are okay.  Cholesterol has much improved.  Iron level is okay.  Blood count is okay insulin level is good.  Thyroid level is good.  Dilantin level is normal vitamin D level is good.  A1c is normal.

## 2023-09-12 NOTE — PROGRESS NOTES
Physical Therapy Daily Treatment Note      Patient: Jaquan Mckay   : 1970  Referring practitioner: Khurram Florez DPM  Date of Initial Visit: Type: THERAPY  Noted: 2023  Today's Date: 2023  Patient seen for 11 sessions       Visit Diagnoses:    ICD-10-CM ICD-9-CM   1. Left ankle pain, unspecified chronicity  M25.572 719.47   2. History of ankle surgery  Z98.890 V45.89   3. Antalgic gait  R26.89 781.2       Subjective Evaluation    History of Present Illness    Subjective comment: Pt has 6/10 pain today.  Pt reports having ankle tightness from missing last week.     Objective   See Exercise, Manual, and Modality Logs for complete treatment.       Assessment & Plan       Assessment  Assessment details: Tx today consisted of foot and ankle exercises in supine and sitting; followed by standing exercises and proprioceptive training and ended with ionto for decreased edema. Pt cont to have difficulty with tandem standing.  Pt tolerated added reps well today. Pt demonstrated good effort with 3/10 post pain.    Plan  Plan details: Will follow progressing ankle stability and decreased pain for improved home ADLs.        Timed:         Manual Therapy:         mins  69534;     Therapeutic Exercise:    29     mins  60219;     Neuromuscular Jazmin:        mins  91822;    Therapeutic Activity:     11     mins  24305;     Gait Training:           mins  35537;     Ultrasound:          mins  47511;    Ionto                              10     mins   36568( no charge)  Self Care                            mins   53588  Canalith Repos         mins 75876      Un-Timed:  Electrical Stimulation:         mins  66094 (MC );  Dry Needling          mins self-pay  Traction          mins 97334      Timed Treatment:   50   mins   Total Treatment:     50   mins    Win Rodriguez PT  [place] License: [id number]      Electronically signed by Win Rodriguez PT, 23, 9:17 AM EDT

## 2023-09-14 ENCOUNTER — TREATMENT (OUTPATIENT)
Dept: PHYSICAL THERAPY | Facility: CLINIC | Age: 53
End: 2023-09-14
Payer: COMMERCIAL

## 2023-09-14 DIAGNOSIS — R26.89 ANTALGIC GAIT: ICD-10-CM

## 2023-09-14 DIAGNOSIS — Z98.890 HISTORY OF ANKLE SURGERY: ICD-10-CM

## 2023-09-14 DIAGNOSIS — M25.572 LEFT ANKLE PAIN, UNSPECIFIED CHRONICITY: Primary | ICD-10-CM

## 2023-09-14 NOTE — PROGRESS NOTES
Physical Therapy Daily Treatment Note      Patient: Jaquan Mckay   : 1970  Referring practitioner: Khurram Florez DPM  Date of Initial Visit: Type: THERAPY  Noted: 2023  Today's Date: 2023  Patient seen for 12 sessions       Visit Diagnoses:    ICD-10-CM ICD-9-CM   1. Left ankle pain, unspecified chronicity  M25.572 719.47   2. History of ankle surgery  Z98.890 V45.89   3. Antalgic gait  R26.89 781.2       Subjective Evaluation    History of Present Illness    Subjective comment: Pt has 5/10 pain today.     Objective   See Exercise, Manual, and Modality Logs for complete treatment.       Assessment & Plan       Assessment  Assessment details: Tx today consisted of exercises for improved mobility and stability; sitting exercises and standing exercises for improved leg and ankle stability; proprioceptive training with improved tandem balance and ended with stm to ankle for decreased edema and ice post tx.  Pt responded well to added weight with laq and ankle TB exercise and reported decreased pain following stm to 2/10.    Plan  Plan details: Will follow progressing ankle stability and improved gait and function.        Timed:         Manual Therapy:    12     mins  63483;     Therapeutic Exercise:    30     mins  34847;     Neuromuscular Jazmin:    12    mins  36245;    Therapeutic Activity:          mins  21929;     Gait Training:           mins  90980;     Ultrasound:          mins  27235;    Ionto                                   mins   56602  Self Care                            mins   25599  Canalith Repos         mins 30264      Un-Timed:  Electrical Stimulation:         mins  07326 (MC );  Dry Needling          mins self-pay  Traction          mins 45312      Timed Treatment:   54   mins   Total Treatment:     65   mins( 11 min total ice and MH)    Win Rodriguez PT  [place] License: [id number]      Electronically signed by Win Rodriguez PT, 23, 1:13 PM EDT

## 2023-09-19 ENCOUNTER — TREATMENT (OUTPATIENT)
Dept: PHYSICAL THERAPY | Facility: CLINIC | Age: 53
End: 2023-09-19
Payer: COMMERCIAL

## 2023-09-19 DIAGNOSIS — S86.312A TEAR OF PERONEAL TENDON OF LEFT FOOT: ICD-10-CM

## 2023-09-19 DIAGNOSIS — M25.572 LEFT ANKLE PAIN, UNSPECIFIED CHRONICITY: Primary | ICD-10-CM

## 2023-09-19 DIAGNOSIS — R26.89 ANTALGIC GAIT: ICD-10-CM

## 2023-09-19 DIAGNOSIS — Z98.890 HISTORY OF ANKLE SURGERY: ICD-10-CM

## 2023-09-19 NOTE — PROGRESS NOTES
Physical Therapy Daily Treatment Note      Patient: Jaquan Mckay   : 1970  Referring practitioner: Khurram Florez DPM  Date of Initial Visit: Type: THERAPY  Noted: 2023  Today's Date: 2023  Patient seen for 13 sessions       Visit Diagnoses:    ICD-10-CM ICD-9-CM   1. Left ankle pain, unspecified chronicity  M25.572 719.47   2. History of ankle surgery  Z98.890 V45.89   3. Antalgic gait  R26.89 781.2   4. Tear of peroneal tendon of left foot  S86.312A 844.8       Subjective: Patient reports 5/10 left ankle pain prior to tx. Pt states as he was stepping up on the sidewalk at home on Saturday, he had an audible pop in the ankle and it gave away. Pt notes he had his ankle brace on and was able to catch the fall. Pt is scheduled to f/u with referring orthopedic on 23 and plans to discuss. Pt wishes to continue with session.       Objective   See Exercise, Manual, and Modality Logs for complete treatment.       Assessment/Plan: Supervising therapist, Win Rodriguez, PT notified and aware of pt's subjective reports. PT assessed pt's ankle with no bruising or increased edema observed. PT educated patient to continue to monitor symptoms and perform therapy to his tolerance; pt verbalized understanding. Treatment consisted of therex and therapeutic activities per flow sheet for left ankle to assist with improved range of motion, improved intrinsic strength and to ease difficulty with ADL's including negotiating steps. Pt observed with no signs of distress and was provided with cues and demonstration as needed. Manual therapy including STM performed to left foot/ ankle f/b cryotherapy at conclusion. Pt continues to benefit from therapy services and will be progressed as tolerated to address goals, reduce pain and improve mobility. No adverse reactions observed during, and/ or following tx. Continue with PT's POC.       Timed:         Manual Therapy:    12     mins  89665;     Therapeutic Exercise:     32     mins  53978;     Neuromuscular Jazmin:        mins  68570;    Therapeutic Activity:     10     mins  24217;     Gait Training:           mins  95824;     Ultrasound:          mins  73029;    Ionto                                   mins   53747  Self Care                            mins   93097      Un-Timed:  Electrical Stimulation:         mins  62907 ( );  Dry Needling          mins self-pay  Traction          mins 58128  Canalith Repos         mins 86661    Timed Treatment:  54    mins   Total Treatment:    64    mins (CP: x 5 min, MH: x 5 min)     Leighann Brown. NEIL [place]  [place] License: [id number]

## 2023-09-20 ENCOUNTER — TELEPHONE (OUTPATIENT)
Dept: FAMILY MEDICINE CLINIC | Facility: CLINIC | Age: 53
End: 2023-09-20
Payer: COMMERCIAL

## 2023-09-20 DIAGNOSIS — E66.01 CLASS 2 SEVERE OBESITY DUE TO EXCESS CALORIES WITH SERIOUS COMORBIDITY AND BODY MASS INDEX (BMI) OF 37.0 TO 37.9 IN ADULT: Primary | ICD-10-CM

## 2023-09-20 RX ORDER — PHENTERMINE HYDROCHLORIDE 37.5 MG/1
37.5 TABLET ORAL
Qty: 30 TABLET | Refills: 0 | Status: SHIPPED | OUTPATIENT
Start: 2023-09-20

## 2023-09-20 NOTE — TELEPHONE ENCOUNTER
----- Message from BALDOMERO Thao sent at 9/20/2023  1:33 PM EDT -----  This is done    ----- Message -----  From: Lorna Gale MA  Sent: 9/19/2023  10:53 AM EDT  To: BALDOMERO Thao    Pt called in requesting a refill on adipex.

## 2023-09-21 ENCOUNTER — TREATMENT (OUTPATIENT)
Dept: PHYSICAL THERAPY | Facility: CLINIC | Age: 53
End: 2023-09-21
Payer: COMMERCIAL

## 2023-09-21 DIAGNOSIS — R26.89 ANTALGIC GAIT: ICD-10-CM

## 2023-09-21 DIAGNOSIS — Z98.890 HISTORY OF ANKLE SURGERY: ICD-10-CM

## 2023-09-21 DIAGNOSIS — S86.312A TEAR OF PERONEAL TENDON OF LEFT FOOT: ICD-10-CM

## 2023-09-21 DIAGNOSIS — M25.572 LEFT ANKLE PAIN, UNSPECIFIED CHRONICITY: Primary | ICD-10-CM

## 2023-09-21 NOTE — PROGRESS NOTES
Physical Therapy Daily Treatment Note      Patient: Jaquan Mckay   : 1970  Referring practitioner: Khurram Florez DPM  Date of Initial Visit: Type: THERAPY  Noted: 2023  Today's Date: 2023  Patient seen for 14 sessions       Visit Diagnoses:    ICD-10-CM ICD-9-CM   1. Left ankle pain, unspecified chronicity  M25.572 719.47   2. History of ankle surgery  Z98.890 V45.89   3. Antalgic gait  R26.89 781.2   4. Tear of peroneal tendon of left foot  S86.312A 844.8       Subjective Evaluation    History of Present Illness    Subjective comment: Pt reports 7/10 left ankle pain prior to today's session. He states he is scheduled to follow-up with Dr. Florez on 23.Pain  Current pain ratin         Objective   See Exercise, Manual, and Modality Logs for complete treatment.       Assessment & Plan       Assessment  Assessment details: Today's session was initiated with MH to the left ankle, with no skin irritation observed. Manual therapy included STM/retrograde massage to the left ankle, with lateral tenderness reported. Therapeutic exercises were performed for improved left ankle ROM and strength. Therapeutic activities were performed for improved functional mobility and independence, including step-ups, with reports of left ankle discomfort. The patient ambulated with decreased left weight shift and stance phase. Today's session concluded with cryotherapy, with no skin irritation observed. The patient reported 3/10  left ankle pain following today's session.     Plan  Plan details: Progress as indicated, per MD recommendation.        Timed:         Manual Therapy:    13     mins  78677;     Therapeutic Exercise:    29     mins  22254;     Neuromuscular Jazmin:        mins  42911;    Therapeutic Activity:     12     mins  95343;     Gait Training:           mins  20714;     Ultrasound:          mins  29945;    Ionto                                   mins   79936  Self Care                             mins   84613      Un-Timed:  Electrical Stimulation:         mins  89340 ( );  Dry Needling          mins self-pay  Traction          mins 46276  Canalith Repos         mins 03805    Timed Treatment:   54   mins   Total Treatment:     64   mins (5 min MH, 5 min cryotherapy)    Ashley Claudene Dalton, PT  [place] License: [id number]

## 2023-09-26 ENCOUNTER — TREATMENT (OUTPATIENT)
Dept: PHYSICAL THERAPY | Facility: CLINIC | Age: 53
End: 2023-09-26
Payer: COMMERCIAL

## 2023-09-26 DIAGNOSIS — M25.572 LEFT ANKLE PAIN, UNSPECIFIED CHRONICITY: Primary | ICD-10-CM

## 2023-09-26 DIAGNOSIS — Z98.890 HISTORY OF ANKLE SURGERY: ICD-10-CM

## 2023-09-26 DIAGNOSIS — S86.312A TEAR OF PERONEAL TENDON OF LEFT FOOT: ICD-10-CM

## 2023-09-26 DIAGNOSIS — R26.89 ANTALGIC GAIT: ICD-10-CM
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
## 2023-09-27 ENCOUNTER — HOSPITAL ENCOUNTER (EMERGENCY)
Facility: HOSPITAL | Age: 53
Discharge: HOME | End: 2023-09-27
Attending: STUDENT IN AN ORGANIZED HEALTH CARE EDUCATION/TRAINING PROGRAM | Admitting: STUDENT IN AN ORGANIZED HEALTH CARE EDUCATION/TRAINING PROGRAM
Payer: COMMERCIAL

## 2023-09-27 ENCOUNTER — APPOINTMENT (OUTPATIENT)
Dept: GENERAL RADIOLOGY | Facility: HOSPITAL | Age: 53
End: 2023-09-27
Payer: COMMERCIAL

## 2023-09-27 VITALS
SYSTOLIC BLOOD PRESSURE: 152 MMHG | DIASTOLIC BLOOD PRESSURE: 87 MMHG | TEMPERATURE: 97.8 F | OXYGEN SATURATION: 98 % | HEIGHT: 67 IN | RESPIRATION RATE: 20 BRPM | HEART RATE: 123 BPM | BODY MASS INDEX: 37.35 KG/M2 | WEIGHT: 238 LBS

## 2023-09-27 DIAGNOSIS — S46.912A STRAIN OF LEFT SHOULDER, INITIAL ENCOUNTER: Primary | ICD-10-CM

## 2023-09-27 DIAGNOSIS — S86.912A KNEE STRAIN, LEFT, INITIAL ENCOUNTER: ICD-10-CM

## 2023-09-27 PROCEDURE — 99283 EMERGENCY DEPT VISIT LOW MDM: CPT

## 2023-09-27 PROCEDURE — 73080 X-RAY EXAM OF ELBOW: CPT

## 2023-09-27 PROCEDURE — 73610 X-RAY EXAM OF ANKLE: CPT

## 2023-09-27 PROCEDURE — 73562 X-RAY EXAM OF KNEE 3: CPT

## 2023-09-27 PROCEDURE — 73030 X-RAY EXAM OF SHOULDER: CPT

## 2023-09-27 RX ORDER — HYDROCODONE BITARTRATE AND ACETAMINOPHEN 5; 325 MG/1; MG/1
1 TABLET ORAL ONCE
Status: COMPLETED | OUTPATIENT
Start: 2023-09-27 | End: 2023-09-27

## 2023-09-27 RX ADMIN — HYDROCODONE BITARTRATE AND ACETAMINOPHEN 1 TABLET: 5; 325 TABLET ORAL at 15:22
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
## 2023-09-27 NOTE — DISCHARGE INSTRUCTIONS
Please utilize rest, ice, elevation and ibuprofen for pain relief. Please follow up with your PCP in 2 days or return to ER if symptoms worsen.

## 2023-10-03 ENCOUNTER — TREATMENT (OUTPATIENT)
Dept: PHYSICAL THERAPY | Facility: CLINIC | Age: 53
End: 2023-10-03
Payer: COMMERCIAL

## 2023-10-03 DIAGNOSIS — R26.89 ANTALGIC GAIT: ICD-10-CM

## 2023-10-03 DIAGNOSIS — M25.572 LEFT ANKLE PAIN, UNSPECIFIED CHRONICITY: Primary | ICD-10-CM

## 2023-10-03 DIAGNOSIS — S86.312A TEAR OF PERONEAL TENDON OF LEFT FOOT: ICD-10-CM

## 2023-10-03 DIAGNOSIS — Z98.890 HISTORY OF ANKLE SURGERY: ICD-10-CM
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
## 2023-10-05 ENCOUNTER — TREATMENT (OUTPATIENT)
Dept: PHYSICAL THERAPY | Facility: CLINIC | Age: 53
End: 2023-10-05
Payer: COMMERCIAL

## 2023-10-05 DIAGNOSIS — M25.572 LEFT ANKLE PAIN, UNSPECIFIED CHRONICITY: Primary | ICD-10-CM

## 2023-10-05 DIAGNOSIS — Z98.890 HISTORY OF ANKLE SURGERY: ICD-10-CM

## 2023-10-05 DIAGNOSIS — S86.312A TEAR OF PERONEAL TENDON OF LEFT FOOT: ICD-10-CM

## 2023-10-05 DIAGNOSIS — R26.89 ANTALGIC GAIT: ICD-10-CM
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
## 2023-10-09 ENCOUNTER — OFFICE VISIT (OUTPATIENT)
Dept: FAMILY MEDICINE CLINIC | Facility: CLINIC | Age: 53
End: 2023-10-09
Payer: COMMERCIAL

## 2023-10-09 VITALS
WEIGHT: 241 LBS | HEIGHT: 67 IN | DIASTOLIC BLOOD PRESSURE: 80 MMHG | TEMPERATURE: 98.2 F | OXYGEN SATURATION: 100 % | BODY MASS INDEX: 37.83 KG/M2 | HEART RATE: 96 BPM | SYSTOLIC BLOOD PRESSURE: 132 MMHG | RESPIRATION RATE: 18 BRPM

## 2023-10-09 DIAGNOSIS — F51.04 PSYCHOPHYSIOLOGICAL INSOMNIA: ICD-10-CM

## 2023-10-09 DIAGNOSIS — Z23 ENCOUNTER FOR IMMUNIZATION: ICD-10-CM

## 2023-10-09 DIAGNOSIS — E78.2 MIXED HYPERLIPIDEMIA: ICD-10-CM

## 2023-10-09 DIAGNOSIS — E66.01 CLASS 2 SEVERE OBESITY DUE TO EXCESS CALORIES WITH SERIOUS COMORBIDITY AND BODY MASS INDEX (BMI) OF 37.0 TO 37.9 IN ADULT: Primary | ICD-10-CM

## 2023-10-09 DIAGNOSIS — K21.9 GASTROESOPHAGEAL REFLUX DISEASE WITHOUT ESOPHAGITIS: ICD-10-CM

## 2023-10-09 DIAGNOSIS — I10 ESSENTIAL HYPERTENSION: ICD-10-CM

## 2023-10-09 PROCEDURE — 1160F RVW MEDS BY RX/DR IN RCRD: CPT | Performed by: NURSE PRACTITIONER

## 2023-10-09 PROCEDURE — 90471 IMMUNIZATION ADMIN: CPT | Performed by: NURSE PRACTITIONER

## 2023-10-09 PROCEDURE — 99214 OFFICE O/P EST MOD 30 MIN: CPT | Performed by: NURSE PRACTITIONER

## 2023-10-09 PROCEDURE — 90686 IIV4 VACC NO PRSV 0.5 ML IM: CPT | Performed by: NURSE PRACTITIONER

## 2023-10-09 PROCEDURE — 3075F SYST BP GE 130 - 139MM HG: CPT | Performed by: NURSE PRACTITIONER

## 2023-10-09 PROCEDURE — 1159F MED LIST DOCD IN RCRD: CPT | Performed by: NURSE PRACTITIONER

## 2023-10-09 PROCEDURE — 3079F DIAST BP 80-89 MM HG: CPT | Performed by: NURSE PRACTITIONER

## 2023-10-09 RX ORDER — PHENTERMINE HYDROCHLORIDE 37.5 MG/1
37.5 TABLET ORAL
Qty: 30 TABLET | Refills: 0 | Status: SHIPPED | OUTPATIENT
Start: 2023-10-09
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
## 2023-10-10 ENCOUNTER — TREATMENT (OUTPATIENT)
Dept: PHYSICAL THERAPY | Facility: CLINIC | Age: 53
End: 2023-10-10
Payer: COMMERCIAL

## 2023-10-10 DIAGNOSIS — M25.572 LEFT ANKLE PAIN, UNSPECIFIED CHRONICITY: Primary | ICD-10-CM

## 2023-10-10 DIAGNOSIS — R26.89 ANTALGIC GAIT: ICD-10-CM

## 2023-10-10 DIAGNOSIS — Z98.890 HISTORY OF ANKLE SURGERY: ICD-10-CM

## 2023-10-10 DIAGNOSIS — S86.312A TEAR OF PERONEAL TENDON OF LEFT FOOT: ICD-10-CM

## 2023-10-10 PROCEDURE — 97110 THERAPEUTIC EXERCISES: CPT | Performed by: PHYSICAL THERAPIST

## 2023-10-10 PROCEDURE — 97140 MANUAL THERAPY 1/> REGIONS: CPT | Performed by: PHYSICAL THERAPIST

## 2023-10-10 PROCEDURE — 97530 THERAPEUTIC ACTIVITIES: CPT | Performed by: PHYSICAL THERAPIST
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
## 2023-10-11 ENCOUNTER — TREATMENT (OUTPATIENT)
Dept: PHYSICAL THERAPY | Facility: CLINIC | Age: 53
End: 2023-10-11
Payer: COMMERCIAL

## 2023-10-11 NOTE — PROGRESS NOTES
Physical Therapy Daily Treatment Note      Patient: Jaquan Mckay   : 1970  Referring practitioner: Khurram Florez DPM  Date of Initial Visit: Type: THERAPY  Noted: 2023  Today's Date: 10/11/2023  Patient seen for 19 sessions       Visit Diagnoses:  No diagnosis found.    Subjective Evaluation    History of Present Illness    Subjective comment: Pt reports having 4/10 pain today.  Pt reports having incresaed heel sorness today.       Objective   See Exercise, Manual, and Modality Logs for complete treatment.       Assessment & Plan       Assessment  Assessment details: Tx today consisted of mh to left gastroc; followed by manual stretching and there ex for improved ankle mobility and stability; standing exercises and proprioceptive training and ended with ice and ionto for decreased edema.  Pt demonstrated good effort with 3/10 pain.      Plan  Plan details: Will follow progressing ankle stability and mobility.            Timed:         Manual Therapy:         mins  38013;     Therapeutic Exercise:    24     mins  20325;     Neuromuscular Jazmin:    14    mins  39015;    Therapeutic Activity:          mins  61404;     Gait Training:           mins  81697;     Ultrasound:          mins  32394;    Ionto                             10      mins   03229(No charge)  Self Care                            mins   99448  Canalith Repos         mins 89962      Un-Timed:  Electrical Stimulation:         mins  01450 (MC );  Dry Needling          mins self-pay  Traction          mins 93309      Timed Treatment:   38   mins   Total Treatment:     48   mins    Win Rodriguez PT  [place] License: [id number]      Electronically signed by Win Rodriguez PT, 10/11/23, 1:50 PM EDT

## 2023-10-17 ENCOUNTER — TREATMENT (OUTPATIENT)
Dept: PHYSICAL THERAPY | Facility: CLINIC | Age: 53
End: 2023-10-17
Payer: COMMERCIAL

## 2023-10-17 DIAGNOSIS — M25.572 LEFT ANKLE PAIN, UNSPECIFIED CHRONICITY: Primary | ICD-10-CM

## 2023-10-17 DIAGNOSIS — R26.89 ANTALGIC GAIT: ICD-10-CM

## 2023-10-17 DIAGNOSIS — S86.312A TEAR OF PERONEAL TENDON OF LEFT FOOT: ICD-10-CM

## 2023-10-17 DIAGNOSIS — Z98.890 HISTORY OF ANKLE SURGERY: ICD-10-CM

## 2023-10-17 PROCEDURE — 97110 THERAPEUTIC EXERCISES: CPT | Performed by: PHYSICAL THERAPIST

## 2023-10-17 PROCEDURE — 97140 MANUAL THERAPY 1/> REGIONS: CPT | Performed by: PHYSICAL THERAPIST

## 2023-10-17 PROCEDURE — 97530 THERAPEUTIC ACTIVITIES: CPT | Performed by: PHYSICAL THERAPIST

## 2023-10-17 NOTE — PROGRESS NOTES
Physical Therapy Daily Treatment Note      Patient: Jaquan Mckay   : 1970  Referring practitioner: Khurram Florez DPM  Date of Initial Visit: Type: THERAPY  Noted: 2023  Today's Date: 10/17/2023  Patient seen for 20 sessions       Visit Diagnoses:    ICD-10-CM ICD-9-CM   1. Left ankle pain, unspecified chronicity  M25.572 719.47   2. Antalgic gait  R26.89 781.2   3. Tear of peroneal tendon of left foot  S86.312A 844.8   4. History of ankle surgery  Z98.890 V45.89       Subjective: Patient reports 5/10 left foot/ ankle pain prior to tx. Pt states he feels the Ionto Patch left a small bruise following last application.       Objective   See Exercise, Manual, and Modality Logs for complete treatment.       Assessment/Plan: Supervising therapist, Radha Tijerina, PT, DPT and PTA assessed left posterior ankle with possible slight discoloration noted. Pt educated to continue to monitor area and will notify evaluating therapist of pt's subjective reports. Today's treatment initiated with MH to left foot/ ankle f/b manual therapy, therex and therapeutic activities per flow sheet. Cryotherapy denied at conclusion. Pt continues to perform program with focus on improved ankle ROM, improved strength and closed chain stability. Pt demonstrated no signs of distress during tx, and posterior ankle was avoided where patch was applied in previous session as precautionary. Pt educated to ice at home as needed. Pt continues to benefit from therapy services and will be progressed as tolerated to address goals, reduce pain and improve mobility. Continue with PT's POC.       Timed:         Manual Therapy:    13     mins  66187;     Therapeutic Exercise:    24     mins  48969;     Neuromuscular Jazmin:        mins  08792;    Therapeutic Activity:    10      mins  24384;     Gait Training:           mins  38433;     Ultrasound:          mins  04478;    Ionto                                   mins   78053  Self Care                             mins   38108      Un-Timed:  Electrical Stimulation:         mins  15694 ( );  Dry Needling          mins self-pay  Traction          mins 27639  Canalith Repos         mins 22226    Timed Treatment:   47   mins   Total Treatment:    52    mins (MH: x 5 min)     Leighann Brown. NEIL [place]  [place] License: [id number]

## 2023-10-19 ENCOUNTER — TREATMENT (OUTPATIENT)
Dept: PHYSICAL THERAPY | Facility: CLINIC | Age: 53
End: 2023-10-19
Payer: COMMERCIAL

## 2023-10-19 DIAGNOSIS — R26.89 ANTALGIC GAIT: ICD-10-CM

## 2023-10-19 DIAGNOSIS — Z98.890 HISTORY OF ANKLE SURGERY: ICD-10-CM

## 2023-10-19 DIAGNOSIS — S86.312A TEAR OF PERONEAL TENDON OF LEFT FOOT: ICD-10-CM

## 2023-10-19 DIAGNOSIS — M25.572 LEFT ANKLE PAIN, UNSPECIFIED CHRONICITY: Primary | ICD-10-CM

## 2023-10-19 NOTE — PROGRESS NOTES
Physical Therapy Daily Treatment Note      Patient: Jaquan Mckay   : 1970  Referring practitioner: Khurram Florez DPM  Date of Initial Visit: Type: THERAPY  Noted: 2023  Today's Date: 10/19/2023  Patient seen for 21 sessions       Visit Diagnoses:    ICD-10-CM ICD-9-CM   1. Left ankle pain, unspecified chronicity  M25.572 719.47   2. Antalgic gait  R26.89 781.2   3. Tear of peroneal tendon of left foot  S86.312A 844.8   4. History of ankle surgery  Z98.890 V45.89       Subjective Evaluation    History of Present Illness    Subjective comment: Pt reports having 5/10 pain today.     Objective   See Exercise, Manual, and Modality Logs for complete treatment.       Assessment & Plan       Assessment  Assessment details: Tx today consisted of mh to left leg; followed by exercises for improved ankle mobility; proprioceptive training with foam and tandem; stm to foot and ankle and ended with ice for decreased pain.  Pt responded well with 3/10 post pain today.    Plan  Plan details: Will follow progressing ankle mobility and stability for improved ADLs.          Timed:         Manual Therapy:    13     mins  21523;     Therapeutic Exercise:    16     mins  28207;     Neuromuscular Jazmin:    12    mins  73885;    Therapeutic Activity:          mins  54199;     Gait Training:           mins  33713;     Ultrasound:          mins  88172;    Ionto                                   mins   35475  Self Care                            mins   19604  Canalith Repos         mins 19179      Un-Timed:  Electrical Stimulation:         mins  89490 (MC );  Dry Needling          mins self-pay  Traction          mins 37212      Timed Treatment:   41   mins   Total Treatment:     51   mins(5 min mh and 5 min ice)    Win Rodriguez PT  [place] License: [id number]      Electronically signed by Win Rodriguez PT, 10/19/23, 1:40 PM EDT

## 2023-10-25 ENCOUNTER — TREATMENT (OUTPATIENT)
Dept: PHYSICAL THERAPY | Facility: CLINIC | Age: 53
End: 2023-10-25
Payer: COMMERCIAL

## 2023-10-25 DIAGNOSIS — M25.572 LEFT ANKLE PAIN, UNSPECIFIED CHRONICITY: Primary | ICD-10-CM

## 2023-10-25 DIAGNOSIS — S86.312A TEAR OF PERONEAL TENDON OF LEFT FOOT: ICD-10-CM

## 2023-10-25 DIAGNOSIS — R26.89 ANTALGIC GAIT: ICD-10-CM

## 2023-10-25 DIAGNOSIS — Z98.890 HISTORY OF ANKLE SURGERY: ICD-10-CM

## 2023-10-25 PROCEDURE — 97530 THERAPEUTIC ACTIVITIES: CPT | Performed by: PHYSICAL THERAPIST

## 2023-10-25 PROCEDURE — 97112 NEUROMUSCULAR REEDUCATION: CPT | Performed by: PHYSICAL THERAPIST

## 2023-10-25 PROCEDURE — 97110 THERAPEUTIC EXERCISES: CPT | Performed by: PHYSICAL THERAPIST
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
## 2023-10-26 ENCOUNTER — TREATMENT (OUTPATIENT)
Dept: PHYSICAL THERAPY | Facility: CLINIC | Age: 53
End: 2023-10-26
Payer: COMMERCIAL

## 2023-10-26 DIAGNOSIS — R26.89 ANTALGIC GAIT: ICD-10-CM

## 2023-10-26 DIAGNOSIS — Z98.890 HISTORY OF ANKLE SURGERY: ICD-10-CM

## 2023-10-26 DIAGNOSIS — M25.572 LEFT ANKLE PAIN, UNSPECIFIED CHRONICITY: Primary | ICD-10-CM

## 2023-10-26 DIAGNOSIS — S86.312A TEAR OF PERONEAL TENDON OF LEFT FOOT: ICD-10-CM

## 2023-10-26 NOTE — PROGRESS NOTES
Physical Therapy Daily Treatment Note      Patient: Jaqaun Mckay   : 1970  Referring practitioner: Khurram Florez DPM  Date of Initial Visit: Type: THERAPY  Noted: 2023  Today's Date: 10/26/2023  Patient seen for 23 sessions       Visit Diagnoses:    ICD-10-CM ICD-9-CM   1. Left ankle pain, unspecified chronicity  M25.572 719.47   2. Tear of peroneal tendon of left foot  S86.312A 844.8   3. History of ankle surgery  Z98.890 V45.89   4. Antalgic gait  R26.89 781.2       Subjective Evaluation    History of Present Illness    Subjective comment: Pt has 5/10 pain today.       Objective   See Exercise, Manual, and Modality Logs for complete treatment.       Assessment & Plan       Assessment  Assessment details: Tx today consisted mh to ankle followed by exercises for improved ankle mobility and stability; followed by proprioceptive training and stm to ankle and ice for decreased pain.  Pt demonstrated good effort today with improved ability with performance of balance activities.  Pt reported 3/10 post pain.    Plan  Plan details: Will follow progressing ankle stability per protocol.          Timed:         Manual Therapy:    13     mins  73433;     Therapeutic Exercise:    17     mins  73429;     Neuromuscular Jazmin:    11    mins  57714;    Therapeutic Activity:          mins  31158;     Gait Training:           mins  79521;     Ultrasound:          mins  22977;    Ionto                                   mins   04974  Self Care                            mins   47527  Canalith Repos         mins 61724      Un-Timed:  Electrical Stimulation:         mins  78918 (MC );  Dry Needling          mins self-pay  Traction          mins 66097      Timed Treatment:   41   mins   Total Treatment:     52   mins( 5min mh and 6 min ice)    Win Rodriguez PT  [place] License: [id number]      Electronically signed by Win Rodriguez PT, 10/26/23, 1:32 PM EDT

## 2023-10-31 ENCOUNTER — TREATMENT (OUTPATIENT)
Dept: PHYSICAL THERAPY | Facility: CLINIC | Age: 53
End: 2023-10-31
Payer: COMMERCIAL

## 2023-10-31 DIAGNOSIS — Z98.890 HISTORY OF ANKLE SURGERY: ICD-10-CM

## 2023-10-31 DIAGNOSIS — R26.89 ANTALGIC GAIT: ICD-10-CM

## 2023-10-31 DIAGNOSIS — S86.312A TEAR OF PERONEAL TENDON OF LEFT FOOT: ICD-10-CM

## 2023-10-31 DIAGNOSIS — M25.572 LEFT ANKLE PAIN, UNSPECIFIED CHRONICITY: Primary | ICD-10-CM

## 2023-10-31 PROCEDURE — 97110 THERAPEUTIC EXERCISES: CPT | Performed by: PHYSICAL THERAPIST

## 2023-10-31 PROCEDURE — 97112 NEUROMUSCULAR REEDUCATION: CPT | Performed by: PHYSICAL THERAPIST

## 2023-10-31 PROCEDURE — 97140 MANUAL THERAPY 1/> REGIONS: CPT | Performed by: PHYSICAL THERAPIST

## 2023-10-31 NOTE — PROGRESS NOTES
Physical Therapy Daily Treatment Note      Patient: Jaquan Mckay   : 1970  Referring practitioner: Khurram Florez DPM  Date of Initial Visit: Type: THERAPY  Noted: 2023  Today's Date: 10/31/2023  Patient seen for 24 sessions       Visit Diagnoses:    ICD-10-CM ICD-9-CM   1. Left ankle pain, unspecified chronicity  M25.572 719.47   2. Tear of peroneal tendon of left foot  S86.312A 844.8   3. History of ankle surgery  Z98.890 V45.89   4. Antalgic gait  R26.89 781.2       Subjective Evaluation    History of Present Illness    Subjective comment: Pt has 5/10 pain today.     Objective   See Exercise, Manual, and Modality Logs for complete treatment.       Assessment & Plan       Assessment  Assessment details: Tx today consisted of mh to right ankle; manual stretching and there ex for improved ankle mobility and stability; proprioceptive training and ended with stm for decreased pain.  Pt demonstrated good effort today with 1/10 post pain.  Pt was noted to demonstrate improved tandem and foam standing today.    Plan  Plan details: Will discharge next session with HEP.          Timed:         Manual Therapy:    14     mins  73314;     Therapeutic Exercise:    16     mins  52675;     Neuromuscular Jazmin:    12    mins  65963;    Therapeutic Activity:          mins  30829;     Gait Training:           mins  45499;     Ultrasound:          mins  97041;    Ionto                                   mins   24446  Self Care                            mins   02211  Canalith Repos         mins 19351      Un-Timed:  Electrical Stimulation:         mins  53134 (MC );  Dry Needling          mins self-pay  Traction          mins 42132      Timed Treatment:   42   mins   Total Treatment:     47   mins(5min mh)    Win Rodriguez PT  [place] License: [id number]      Electronically signed by Win Rodriguez PT, 10/31/23, 1:33 PM EDT

## 2023-11-02 ENCOUNTER — TREATMENT (OUTPATIENT)
Dept: PHYSICAL THERAPY | Facility: CLINIC | Age: 53
End: 2023-11-02
Payer: COMMERCIAL

## 2023-11-02 DIAGNOSIS — M25.572 LEFT ANKLE PAIN, UNSPECIFIED CHRONICITY: Primary | ICD-10-CM

## 2023-11-02 DIAGNOSIS — S86.312A TEAR OF PERONEAL TENDON OF LEFT FOOT: ICD-10-CM

## 2023-11-02 DIAGNOSIS — Z98.890 HISTORY OF ANKLE SURGERY: ICD-10-CM

## 2023-11-02 DIAGNOSIS — R26.89 ANTALGIC GAIT: ICD-10-CM

## 2023-11-02 NOTE — PROGRESS NOTES
Physical Therapy Daily Treatment Note/Discharge      Patient: Jaquan Mckay   : 1970  Referring practitioner: Khurram Florez DPM  Date of Initial Visit: Type: THERAPY  Noted: 2023  Today's Date: 2023  Patient seen for 25 sessions       Visit Diagnoses:    ICD-10-CM ICD-9-CM   1. Left ankle pain, unspecified chronicity  M25.572 719.47   2. History of ankle surgery  Z98.890 V45.89   3. Antalgic gait  R26.89 781.2   4. Tear of peroneal tendon of left foot  S86.312A 844.8       Subjective Evaluation    History of Present Illness    Subjective comment: Pt reports having 4/10 pain today.       Objective   See Exercise, Manual, and Modality Logs for complete treatment.       Assessment & Plan       Assessment  Assessment details: Tx today consisted of mh to right ankle; manual stretching and there ex for improved ankle mobility and stability; proprioceptive training and ended with stm for decreased pain.  Pt demonstrated good effort today with good understanding of HEP and written program.  Pt reported 2/10 post pain.    Plan  Plan details: Pt will be discharged today with a HEP.  Pt will follow up with MD as needed and was instructed to contact therapy as needed.          Timed:         Manual Therapy:   12      mins  92502;     Therapeutic Exercise:    31     mins  75840;     Neuromuscular Jazmin:        mins  34478;    Therapeutic Activity:          mins  68954;     Gait Training:           mins  84152;     Ultrasound:          mins  93400;    Ionto                                   mins   92120  Self Care                            mins   79096  Canalith Repos         mins 43318      Un-Timed:  Electrical Stimulation:         mins  80029 ( );  Dry Needling          mins self-pay  Traction          mins 02670      Timed Treatment:   43   mins   Total Treatment:     48   mins(5 min mh)    Win Rodriguez PT  [place] License: [id number]      Electronically signed by Win Rodriguez PT, 23,  10:34 AM EDT

## 2023-11-07 NOTE — PROGRESS NOTES
I saw and evaluated the patient. I personally obtained the key and critical portions of the history and physical exam or was physically present for key and critical portions performed by the resident/fellow. I reviewed the resident/fellow's documentation and discussed the patient with the resident/fellow. I agree with the resident/fellow's medical decision making as documented in the note.    Keyona Nguyen MD        Physical Therapy Daily Treatment Note      Patient: Jaquan Mckay   : 1970  Referring practitioner: Khurram Florez DPM  Date of Initial Visit: Type: THERAPY  Noted: 2023  Today's Date: 2023  Patient seen for 8 sessions       Visit Diagnoses:    ICD-10-CM ICD-9-CM   1. Left ankle pain, unspecified chronicity  M25.572 719.47   2. Antalgic gait  R26.89 781.2   3. History of ankle surgery  Z98.890 V45.89       Subjective Evaluation    History of Present Illness    Subjective comment: Pt has 3/10 pain today.     Objective   See Exercise, Manual, and Modality Logs for complete treatment.       Assessment & Plan       Assessment  Assessment details: Tx today consisted of mh to left gastroc while stretching and ankle exercises, standing exercises with progressed step height and reps; followed by added LE bike and TG and ended with ankle exercises and ionto and ice for decreased edema.  Pt responded well to tx with 2/10 post pain.    Plan  Plan details: Will follow progressing ankle mobility and stability per protocol.        Timed:         Manual Therapy:         mins  91217;     Therapeutic Exercise:    27     mins  42731;     Neuromuscular Jazmin:        mins  30781;    Therapeutic Activity:     13     mins  25621;     Gait Training:           mins  37536;     Ultrasound:          mins  82269;    Ionto                               10    mins   57500  Self Care                            mins   69389  Canalith Repos         mins 26439      Un-Timed:  Electrical Stimulation:         mins  76568 ( );  Dry Needling          mins self-pay  Traction          mins 69987      Timed Treatment:   50   mins   Total Treatment:     50   mins    Assisted by NEIL Bender PT  [place] License: [id number]      Electronically signed by Win Rodriguez PT, 23, 3:22 PM EDT

## 2023-11-08 NOTE — PROGRESS NOTES
Physical Therapy Daily Progress Note      Patient: Jaquan Mckay   : 1970  Referring practitioner: Khurram Florez MD  Date of Initial Visit: Type: THERAPY  Noted: 2020  Today's Date: 3/11/2020  Patient seen for 8 sessions         Subjective Evaluation    History of Present Illness    Subjective comment: Patient arrives to therapy w/ reports of 8/10 right ankle pain.  Pt states he is scheduled to have a nerve conduction study for the right LE done on 2020, and then is scheduled to f/u ortho thereafter.         Objective   See Exercise, Manual, and Modality Logs for complete treatment.       Assessment & Plan     Assessment  Assessment details: Patient completed today's session w/ reports of decreased pain following, 7/10.  MH applied to pt's right ankle f/b there ex as listed w/ focus on improved ankle range of motion, ankle strength, and stability.  Treatment concluded with manual STM and cryotherapy.  There ex progressed w/ additional LE strengthening activities added to program.  Pt continues to benefit from therapy services to address goals, and achieve maximum level of function.  Continue w/ PT's POC.         Visit Diagnoses:    ICD-10-CM ICD-9-CM   1. Closed fracture of distal end of right fibula with routine healing, unspecified fracture morphology, subsequent encounter S82.831D V54.16   2. Closed avulsion fracture of lateral malleolus of right fibula with routine healing, subsequent encounter S82.61XD V54.19       Progress per Plan of Care and Progress strengthening /stabilization /functional activity           Timed:  Manual Therapy:    10     mins  18255;  Therapeutic Exercise:    43     mins  18951;     Neuromuscular Jazmin:        mins  73395;    Therapeutic Activity:          mins  92772;     Gait Training:           mins  03453;     Ultrasound:          mins  22059;    Electrical Stimulation:         mins  27652 ( );    Untimed:  Electrical Stimulation:         mins  32474  ----- Message from Tiffany Garcia MD sent at 11/8/2023 10:19 AM EST -----  Echocardiogram looks good, heart is pumping normally no substantial abnormalities   ( );  Mechanical Traction:         mins  33327;     Timed Treatment:  53   mins   Total Treatment:    68    mins  Leighann Brown. NEIL [place]  Physical Therapist

## 2023-11-13 ENCOUNTER — OFFICE VISIT (OUTPATIENT)
Dept: FAMILY MEDICINE CLINIC | Facility: CLINIC | Age: 53
End: 2023-11-13
Payer: COMMERCIAL

## 2023-11-13 VITALS
WEIGHT: 236 LBS | RESPIRATION RATE: 20 BRPM | DIASTOLIC BLOOD PRESSURE: 82 MMHG | SYSTOLIC BLOOD PRESSURE: 134 MMHG | TEMPERATURE: 98.4 F | HEART RATE: 102 BPM | HEIGHT: 67 IN | BODY MASS INDEX: 37.04 KG/M2 | OXYGEN SATURATION: 99 %

## 2023-11-13 DIAGNOSIS — Z00.00 VISIT FOR ANNUAL HEALTH EXAMINATION: Primary | ICD-10-CM

## 2023-11-13 DIAGNOSIS — M79.672 LEFT FOOT PAIN: ICD-10-CM

## 2023-11-13 DIAGNOSIS — Z87.892 PERSONAL HISTORY OF ANAPHYLAXIS: ICD-10-CM

## 2023-11-13 DIAGNOSIS — Z91.81 AT HIGH RISK FOR FALLS: ICD-10-CM

## 2023-11-13 DIAGNOSIS — K59.04 CHRONIC IDIOPATHIC CONSTIPATION: ICD-10-CM

## 2023-11-13 DIAGNOSIS — E66.01 CLASS 2 SEVERE OBESITY DUE TO EXCESS CALORIES WITH SERIOUS COMORBIDITY AND BODY MASS INDEX (BMI) OF 37.0 TO 37.9 IN ADULT: ICD-10-CM

## 2023-11-13 DIAGNOSIS — F32.A ANXIETY AND DEPRESSION: ICD-10-CM

## 2023-11-13 DIAGNOSIS — Z79.899 LONG-TERM USE OF HIGH-RISK MEDICATION: ICD-10-CM

## 2023-11-13 DIAGNOSIS — Z98.890 HISTORY OF ANKLE SURGERY: ICD-10-CM

## 2023-11-13 DIAGNOSIS — F41.9 ANXIETY AND DEPRESSION: ICD-10-CM

## 2023-11-13 PROCEDURE — 1159F MED LIST DOCD IN RCRD: CPT | Performed by: NURSE PRACTITIONER

## 2023-11-13 PROCEDURE — 99396 PREV VISIT EST AGE 40-64: CPT | Performed by: NURSE PRACTITIONER

## 2023-11-13 PROCEDURE — 1160F RVW MEDS BY RX/DR IN RCRD: CPT | Performed by: NURSE PRACTITIONER

## 2023-11-13 PROCEDURE — 3079F DIAST BP 80-89 MM HG: CPT | Performed by: NURSE PRACTITIONER

## 2023-11-13 PROCEDURE — 3075F SYST BP GE 130 - 139MM HG: CPT | Performed by: NURSE PRACTITIONER

## 2023-11-13 RX ORDER — PHENTERMINE HYDROCHLORIDE 37.5 MG/1
37.5 TABLET ORAL
Qty: 30 TABLET | Refills: 0 | Status: SHIPPED | OUTPATIENT
Start: 2023-11-13

## 2023-11-13 RX ORDER — EPINEPHRINE 0.15 MG/.3ML
0.15 INJECTION INTRAMUSCULAR ONCE
Qty: 2 EACH | Refills: 0 | Status: SHIPPED | OUTPATIENT
Start: 2023-11-13 | End: 2023-11-13

## 2023-11-13 RX ORDER — TIZANIDINE HYDROCHLORIDE 4 MG/1
4 CAPSULE, GELATIN COATED ORAL 3 TIMES DAILY
Qty: 30 CAPSULE | Refills: 5 | Status: SHIPPED | OUTPATIENT
Start: 2023-11-13

## 2023-11-13 RX ORDER — HYDROCODONE BITARTRATE AND ACETAMINOPHEN 7.5; 325 MG/1; MG/1
1 TABLET ORAL EVERY 6 HOURS PRN
Qty: 12 TABLET | Refills: 0 | Status: SHIPPED | OUTPATIENT
Start: 2023-11-13

## 2023-11-13 RX ORDER — LUBIPROSTONE 24 UG/1
24 CAPSULE ORAL 2 TIMES DAILY WITH MEALS
Qty: 60 CAPSULE | Refills: 0 | Status: SHIPPED | OUTPATIENT
Start: 2023-11-13

## 2023-11-13 RX ORDER — MULTIPLE VITAMINS W/ MINERALS TAB 9MG-400MCG
1 TAB ORAL DAILY
Qty: 30 TABLET | Refills: 12 | Status: SHIPPED | OUTPATIENT
Start: 2023-11-13

## 2023-11-13 NOTE — PROGRESS NOTES
"Subjective   Jaquan Mckay is a 52 y.o. male.     Chief Complaint   Patient presents with    Hypertension       History of Present Illness     Physical-here for annual physical.    Insomnia-reports that he is having worsening sleep quality.  He reports that he cannot go to sleep and is only sleeping 2-4 hours per night.  He reports that he went to bed around 2 AM this morning and was up by 7 AM.  He reports that is the most he has slept.    Seizure disorder-currently on Dilantin.  Reports \"a small one\" regarding seizure activity.  He reports it was about 2 weeks ago.  He was at home, sitting.  He reports that he gets a \"feeling through my head and chilling\".  He reports \"I can tell it is a seizure\" and he had dizziness afterward.    Decreased concentration-reports that he has been forgetting things more for the last few weeks.  He reports that he has forgotten to take his medication at least 2 different days.   Foot surgery.  Currently in brace.  He reports that he has been for a follow up to Podiatry.  He will be in a \"brace for the rest of my life\".  It has some harder support areas on the ankle region to prevent twisting or possible sprains of the ankle.  He has some continued pain near his heel insert.  He will continue PT for balance.  He will have a follow up appt in December and will have xrays.    HTN-chronic and ongoing.  Patient is followed by cardiology.  He is currently on lisinopril 30 mg.  No negative side effects.  He denies any recent chest pain or palpitations.   Constipation-does not feel that Amitiza 8 mg BID is helping with constipation.  He would like to have an increase.  He has not a recent GI appt.      The following portions of the patient's history were reviewed and updated as appropriate: CC, ROS, allergies, current medications, past family history, past medical history, past social history, past surgical history and problem list.      Review of Systems   Constitutional:  Positive for " "fatigue. Negative for appetite change, unexpected weight gain and unexpected weight loss.   HENT:  Negative for congestion, ear pain, postnasal drip, rhinorrhea, sore throat, swollen glands, trouble swallowing and voice change.    Eyes:  Negative for pain and visual disturbance.   Respiratory:  Negative for cough, chest tightness, shortness of breath and wheezing.    Cardiovascular:  Negative for chest pain, palpitations and leg swelling.   Gastrointestinal:  Negative for abdominal pain, blood in stool, constipation, diarrhea, nausea and indigestion.   Genitourinary:  Negative for dysuria, hematuria and urgency.   Musculoskeletal:  Positive for arthralgias, gait problem and myalgias. Negative for back pain and joint swelling.   Skin:  Negative for color change and skin lesions.   Allergic/Immunologic: Negative.    Neurological:  Negative for dizziness, numbness and headache.   Hematological: Negative.    Psychiatric/Behavioral:  Positive for decreased concentration, dysphoric mood and stress. Negative for sleep disturbance and suicidal ideas. The patient is nervous/anxious.    All other systems reviewed and are negative.      Objective     /82   Pulse 102   Temp 98.4 °F (36.9 °C)   Resp 20   Ht 170.2 cm (67.01\")   Wt 107 kg (236 lb)   SpO2 99%   BMI 36.95 kg/m²     Physical Exam  Vitals reviewed.   Constitutional:       General: He is not in acute distress.     Appearance: He is well-developed. He is obese. He is not diaphoretic.   HENT:      Head: Normocephalic and atraumatic.      Jaw: No tenderness.      Right Ear: Hearing, tympanic membrane, ear canal and external ear normal.      Left Ear: Hearing, tympanic membrane, ear canal and external ear normal.      Nose: Nose normal. No nasal tenderness or congestion.      Right Sinus: No maxillary sinus tenderness or frontal sinus tenderness.      Left Sinus: No maxillary sinus tenderness or frontal sinus tenderness.      Mouth/Throat:      Lips: Pink.     "  Mouth: Mucous membranes are moist.      Pharynx: Oropharynx is clear. Uvula midline.   Eyes:      General: Lids are normal. No scleral icterus.     Extraocular Movements:      Right eye: Normal extraocular motion and no nystagmus.      Left eye: Normal extraocular motion and no nystagmus.      Conjunctiva/sclera: Conjunctivae normal.      Pupils: Pupils are equal, round, and reactive to light.   Neck:      Thyroid: No thyromegaly or thyroid tenderness.      Vascular: No carotid bruit or JVD.      Trachea: No tracheal tenderness.   Cardiovascular:      Rate and Rhythm: Normal rate and regular rhythm.      Pulses:           Dorsalis pedis pulses are 2+ on the right side and 2+ on the left side.        Posterior tibial pulses are 2+ on the right side and 2+ on the left side.      Heart sounds: Normal heart sounds, S1 normal and S2 normal. No murmur heard.  Pulmonary:      Effort: Pulmonary effort is normal. No accessory muscle usage, prolonged expiration or respiratory distress.      Breath sounds: Normal breath sounds.   Chest:      Chest wall: No tenderness.   Abdominal:      General: Bowel sounds are normal. There is no distension.      Palpations: Abdomen is soft. There is no hepatomegaly, splenomegaly or mass.      Tenderness: There is no abdominal tenderness.   Musculoskeletal:         General: Tenderness present.      Cervical back: Normal range of motion and neck supple.      Right lower leg: No edema.      Left lower leg: No edema.      Comments: No muscular atrophy or flaccidity.  Ankle brace in place on left ankle/foot     Lymphadenopathy:      Head:      Right side of head: No submental or submandibular adenopathy.      Left side of head: No submental or submandibular adenopathy.      Cervical: No cervical adenopathy.      Right cervical: No superficial cervical adenopathy.     Left cervical: No superficial cervical adenopathy.   Skin:     General: Skin is warm and dry.      Capillary Refill: Capillary  refill takes less than 2 seconds.      Coloration: Skin is not jaundiced or pale.      Findings: No erythema.      Nails: There is no clubbing.   Neurological:      Mental Status: He is alert and oriented to person, place, and time.      Cranial Nerves: No cranial nerve deficit or facial asymmetry.      Sensory: No sensory deficit.      Motor: No weakness, tremor, atrophy or abnormal muscle tone.      Coordination: Coordination normal.      Gait: Gait abnormal (moderate antalgia).      Deep Tendon Reflexes: Reflexes are normal and symmetric.   Psychiatric:         Attention and Perception: He is attentive.         Mood and Affect: Mood is depressed. Mood is not anxious.         Speech: Speech normal.         Behavior: Behavior normal. Behavior is cooperative.         Thought Content: Thought content normal.         Cognition and Memory: Cognition normal.         Judgment: Judgment normal.         Assessment & Plan         2/17/2022     1:00 PM   Functional & Cognitive Status   Do you have difficulty preparing food and eating? No   Do you have difficulty bathing yourself, getting dressed or grooming yourself? No   Do you have difficulty using the toilet? No   Do you have difficulty moving around from place to place? No   Do you have trouble with steps or getting out of a bed or a chair? No   Current Diet Unhealthy Diet   Dental Exam Up to date   Eye Exam Up to date   Exercise (times per week) 6 times per week   Current Exercises Include Walking;Treadmill;Weightlifting   Do you need help using the phone?  No   Are you deaf or do you have serious difficulty hearing?  No   Do you need help to go to places out of walking distance? No   Do you need help shopping? No   Do you need help preparing meals?  No   Do you need help with housework?  No   Do you need help with laundry? No   Do you need help taking your medications? No   Do you need help managing money? No   Do you ever drive or ride in a car without wearing a seat  belt? No   Have you felt unusual stress, anger or loneliness in the last month? No   Who do you live with? Alone   If you need help, do you have trouble finding someone available to you? No   Have you been bothered in the last four weeks by sexual problems? No   Do you have difficulty concentrating, remembering or making decisions? No       PHQ-2 Depression Screening  Little interest or pleasure in doing things? 0-->not at all   Feeling down, depressed, or hopeless? 1-->several days   PHQ-2 Total Score 1           Diagnoses and all orders for this visit:    1. Visit for annual health examination (Primary)    2. Class 2 severe obesity due to excess calories with serious comorbidity and body mass index (BMI) of 37.0 to 37.9 in adult  Comments:  Be active.  Work on diet changes.  Continue Adipex.  Orders:  -     phentermine (ADIPEX-P) 37.5 MG tablet; Take 1 tablet by mouth Every Morning Before Breakfast.  Dispense: 30 tablet; Refill: 0  -     multivitamin with minerals tablet tablet; Take 1 tablet by mouth Daily.  Dispense: 30 tablet; Refill: 12    3. Anxiety and depression  Comments:  Continue Remeron as directed.  We will add Abilify.    4. History of ankle surgery  Comments:  continue under the care of ortho  Advised to pad area of tenderness on ankle scar  Orders:  -     HYDROcodone-acetaminophen (Norco) 7.5-325 MG per tablet; Take 1 tablet by mouth Every 6 (Six) Hours As Needed for Moderate Pain.  Dispense: 12 tablet; Refill: 0    5. Left foot pain  Comments:  Continue under the care of orthopedic podiatry  Orders:  -     HYDROcodone-acetaminophen (Norco) 7.5-325 MG per tablet; Take 1 tablet by mouth Every 6 (Six) Hours As Needed for Moderate Pain.  Dispense: 12 tablet; Refill: 0    6. At high risk for falls    7. Long-term use of high-risk medication  -     Urine Drug Screen - Urine, Clean Catch; Future    8. Chronic idiopathic constipation  Overview:  Added automatically from request for surgery  [id number]    Orders:  -     lubiprostone (Amitiza) 24 MCG capsule; Take 1 capsule by mouth 2 (Two) Times a Day With Meals.  Dispense: 60 capsule; Refill: 0    9. Personal history of anaphylaxis  -     EPINEPHrine (EPIPEN JR) 0.15 MG/0.3ML solution auto-injector injection; Inject 0.3 mL into the appropriate muscle as directed by prescriber 1 (One) Time for 1 dose.  Dispense: 2 each; Refill: 0    Other orders  -     TiZANidine (Zanaflex) 4 MG capsule; Take 1 capsule by mouth 3 (Three) Times a Day.  Dispense: 30 capsule; Refill: 5         Jaquan Mckay  reports that he has been smoking cigars and cigarettes. He started smoking about 19 months ago. He has a 4.25 pack-year smoking history. He has been exposed to tobacco smoke. He has never used smokeless tobacco.. I have educated him on the risk of diseases from using tobacco products such as cancer, COPD, and heart disease.     I advised him to quit and he is not willing to quit.    I spent 3  minutes counseling the patient.    Understands disease processes and need for medications.  Understands reasons for urgent and emergent care.  Patient (& family) verbalized agreement for treatment plan.   Emotional support and active listening provided.  Patient provided time to verbalize feelings.  Counseling provided today including importance of good nutrition, exercise as tolerated, dental health, stress reduction and mental health. Importance of immunizations discussed.   Appropriate screenings based on gender (paps, breast exam, mammogram, PSA, colon screens, etc).     Counseled on safe sex practices and STD prevention.   Counseling regarding gun and water safety, domestic violence, and seatbelt use.      Patient assessed today for fall risk.  Patient advised to limit clutter, have well lit areas, do not walk around in darkness (use nightlight PRN), Non skid rugs if using rugs, use caution if small pets at home.  Non skid foot wear.      RTC 1 month, sooner if needed.              This document has been electronically signed by:  BALDOMERO Thao, FNP-C    Dragon disclaimer:  Part of this note may be an electronic transcription/translation of spoken language to printed text using the Dragon Dictation System.

## 2023-11-13 NOTE — PATIENT INSTRUCTIONS
It is important for your health to eat a healthy balanced diet, to exercise as tolerated, obtain dental and vision checkups routinely, work on stress reduction and be active about taking care of your mental health.  Routine age related immunizations(pneumonia, flu, shingles if applicable) are recommended.  Be active in obtaining age and gender routine maintenance exams (such as paps, breast exams, colonscopy, prostate exams, etc).    You are encouraged to have safe sex practices for STD prevention.    Be alert/educated on gun and water safety, seek help for any domestic violence concerns, and seatbelt use is strongly encouraged.       Budget-Friendly Healthy Eating  There are many ways to save money at the grocery store and continue to eat healthy. You can be successful if you:  Plan meals according to your budget.  Make a grocery list and only purchase food according to your grocery list.  Prepare food yourself at home.  What are tips for following this plan?  Reading food labels  Compare food labels between brand name foods and the store brand. Often the nutritional value is the same, but the store brand is lower cost.  Look for products that do not have added sugar, fat, or salt (sodium). These often cost the same but are healthier for you. Products may be labeled as:  Sugar-free.  Nonfat.  Low-fat.  Sodium-free.  Low-sodium.  Look for lean ground beef labeled as at least 92% lean and 8% fat.  Shopping    Buy only the items on your grocery list and go only to the areas of the store that have the items on your list.  Use coupons only for foods and brands you normally buy. Avoid buying items you wouldn't normally buy simply because they are on sale.  Check online and in newspapers for weekly deals.  Buy healthy items from the bulk bins when available, such as herbs, spices, flour, pasta, nuts, and dried fruit.  Buy fruits and vegetables that are in season. Prices are usually lower on in-season produce.  Look at the  "unit price on the price tag. Use it to compare different brands and sizes to find out which item is the best deal.  Choose healthy items that are often low-cost, such as carrots, potatoes, apples, bananas, and oranges. Dried or canned beans are a low-cost protein source.  Buy in bulk and freeze extra food. Items you can buy in bulk include meats, fish, poultry, frozen fruits, and frozen vegetables.  Avoid buying \"ready-to-eat\" foods, such as pre-cut fruits and vegetables and pre-made salads.  If possible, shop around to discover where you can find the best prices. Consider other retailers such as dollar stores, larger wholesale stores, local fruit and vegetable [employer], and [employer] markets.  Do not shop when you are hungry. If you shop while hungry, it may be hard to stick to your list and budget.  Resist impulse buying. Use your grocery list as your official plan for the week.  Buy a variety of vegetables and fruits by purchasing fresh, frozen, and canned items.  Look at the top and bottom shelves for deals. Foods at eye level (eye level of an adult or child) are usually more expensive.  Be efficient with your time when shopping. The more time you spend at the store, the more money you are likely to spend.  To save money when choosing more expensive foods like meats and dairy:  Choose cheaper cuts of meat, such as bone-in chicken thighs and drumsticks instead of skinless and boneless chicken. When you are ready to prepare the chicken, you can remove the skin yourself to make it healthier.  Choose lean meats like chicken or turkey instead of beef.  Choose canned seafood, such as tuna, salmon, or sardines.  Buy eggs as a low-cost source of protein.  Buy dried beans and peas, such as lentils, split peas, or kidney beans instead of meats. Dried beans and peas are a good alternative source of protein.  Buy the larger tubs of yogurt instead of individual-sized containers.  Choose water instead of sodas and other sweetened " "beverages.  Avoid buying chips, cookies, and other \"junk food.\" These items are usually expensive and not healthy.  Cooking  Make extra food and freeze the extras in meal-sized containers or in individual portions for fast meals and snacks.  Pre-cook on days when you have extra time to prepare meals in advance. You can keep these meals in the fridge or freezer and reheat for a quick meal.  When you come home from the grocery store, wash, peel, and cut fruits and vegetables so they are ready to use and eat. This will help reduce food waste.  Meal planning  Do not eat out or get fast food. Prepare food at home.  Make a grocery list and make sure to bring it with you to the store. If you have a smart phone, you could use your phone to create your shopping list.  Plan meals and snacks according to a grocery list and budget you create.  Use leftovers in your meal plan for the week.  Look for recipes where you can cook once and make enough food for two meals.  Prepare budget-friendly types of meals like stews, casseroles, and stir-seals dishes.  Try some meatless meals or try \"no cook\" meals like salads.  Make sure that half your plate is filled with fruits or vegetables. Choose from fresh, frozen, or canned fruits and vegetables. If eating canned, remember to rinse them before eating. This will remove any excess salt added for packaging.  Summary  Eating healthy on a budget is possible if you plan your meals according to your budget, purchase according to your budget and grocery list, and prepare food yourself.  Tips for buying more food on a limited budget include buying generic brands, using coupons only for foods you normally buy, and buying healthy items from the bulk bins when available.  Tips for buying cheaper food to replace expensive food include choosing cheaper, lean cuts of meat, and buying dried beans and peas.  This information is not intended to replace advice given to you by your health care provider. Make " sure you discuss any questions you have with your health care provider.  Document Revised: 09/30/2021 Document Reviewed: 09/30/2021  Elsevier Patient Education © 2022 [employer]        Fall Prevention in the Home, Adult  Falls can cause injuries and affect people of all ages. There are many simple things that you can do to make your home safe and to help prevent falls. Ask for help when making these changes, if needed.  What actions can I take to prevent falls?  General instructions  Use good lighting in all rooms. Replace any light bulbs that burn out, turn on lights if it is dark, and use night-lights.  Place frequently used items in easy-to-reach places. Lower the shelves around your home if necessary.  Set up furniture so that there are clear paths around it. Avoid moving your furniture around.  Remove throw rugs and other tripping hazards from the floor.  Avoid walking on wet floors.  Fix any uneven floor surfaces.  Add color or contrast paint or tape to grab bars and handrails in your home. Place contrasting color strips on the first and last steps of staircases.  When you use a stepladder, make sure that it is completely opened and that the sides and supports are firmly locked. Have someone hold the ladder while you are using it. Do not climb a closed stepladder.  Know where your pets are when moving through your home.  What can I do in the bathroom?     Keep the floor dry. Immediately clean up any water that is on the floor.  Remove soap buildup in the tub or shower regularly.  Use nonskid mats or decals on the floor of the tub or shower.  Attach bath mats securely with double-sided, nonslip rug tape.  If you need to sit down while you are in the shower, use a plastic, nonslip stool.  Install grab bars by the toilet and in the tub and shower. Do not use towel bars as grab bars.  What can I do in the bedroom?  Make sure that a bedside light is easy to reach.  Do not use oversized bedding that reaches the  floor.  Have a firm chair that has side arms to use for getting dressed.  What can I do in the kitchen?  Clean up any spills right away.  If you need to reach for something above you, use a sturdy step stool that has a grab bar.  Keep electrical cables out of the way.  Do not use floor polish or wax that makes floors slippery. If you must use wax, make sure that it is non-skid floor wax.  What can I do with my stairs?  Do not leave any items on the stairs.  Make sure that you have a light switch at the top and the bottom of the stairs. Have them installed if you do not have them.  Make sure that there are handrails on both sides of the stairs. Fix handrails that are broken or loose. Make sure that handrails are as long as the staircases.  Install non-slip stair treads on all stairs in your home.  Avoid having throw rugs at the top or bottom of stairs, or secure the rugs with carpet tape to prevent them from moving.  Choose a carpet design that does not hide the edge of steps on the stairs.  Check any carpeting to make sure that it is firmly attached to the stairs. Fix any carpet that is loose or worn.  What can I do on the outside of my home?  Use bright outdoor lighting.  Regularly repair the edges of walkways and driveways and fix any cracks.  Remove high doorway thresholds.  Trim any shrubbery on the main path into your home.  Regularly check that handrails are securely fastened and in good repair. Both sides of all steps should have handrails.  Install guardrails along the edges of any raised decks or porches.  Clear walkways of debris and clutter, including tools and rocks.  Have leaves, snow, and ice cleared regularly.  Use sand or salt on walkways during winter months.  In the garage, clean up any spills right away, including grease or oil spills.  What other actions can I take?  Wear closed-toe shoes that fit well and support your feet. Wear shoes that have rubber soles or low heels.  Use mobility aids as  needed, such as canes, walkers, scooters, and crutches.  Review your medicines with your health care provider. Some medicines can cause dizziness or changes in blood pressure, which increase your risk of falling.  Talk with your health care provider about other ways that you can decrease your risk of falls. This may include working with a physical therapist or  to improve your strength, balance, and endurance.  Where to find more information  Centers for Disease Control and PreventionCARLO: www.cdc.gov  National [place] on Aging: www.melinda.nih.gov  Contact a health care provider if:  You are afraid of falling at home.  You feel weak, drowsy, or dizzy at home.  You fall at home.  Summary  There are many simple things that you can do to make your home safe and to help prevent falls.  Ways to make your home safe include removing tripping hazards and installing grab bars in the bathroom.  Ask for help when making these changes in your home.  This information is not intended to replace advice given to you by your health care provider. Make sure you discuss any questions you have with your health care provider.  Document Revised: 09/19/2022 Document Reviewed: 07/21/2021  [employer] Patient Education © 2022 [employer]     Health Risks of Smoking  Smoking tobacco is very bad for your health. Tobacco smoke contains many toxic chemicals that can damage every part of your body. Secondhand smoke can be harmful to those around you. Tobacco or nicotine use can cause many long-term (chronic) diseases.  Smoking is difficult to quit because a chemical in tobacco, called nicotine, causes addiction or dependence. When you smoke and inhale, nicotine is absorbed quickly into the bloodstream through your lungs. Both inhaled and non-inhaled nicotine may be addictive.  How can quitting affect me?  There are health benefits of quitting smoking. Some benefits happen right away and others take time. Benefits may include:  Blood  flow, blood pressure, heart rate, and lung capacity may begin to improve. However, any lung damage that has already occurred cannot be repaired.  Temporary respiratory symptoms, such as nasal congestion and cough, may improve over time.  Your risk of heart disease, stroke, and cancer is reduced.  The overall quality of your health may improve.  You may save money, as you will not spend money on tobacco products and may spend less money on smoking-related health issues.  What can increase my risk?  Smoking harms nearly every organ in the body. People who smoke tobacco have a shorter life expectancy and an increased risk of many serious medical problems. These include:  More respiratory infections, such as colds and pneumonia.  Cancer.  Heart disease.  Stroke.  Chronic respiratory diseases.  Delayed wound healing and increased risk of complications during surgery.  Problems with reproduction, pregnancy, and childbirth, such as infertility, early (premature) births, stillbirths, and birth defects.  Secondhand smoke exposure to children increases the risk of:  Sudden infant death syndrome (SIDS).  Infections in the nose, throat, or airways (respiratory infections).  Chronic respiratory symptoms.  What actions can I take to quit?  Smoking is an addiction that affects both your body and your mind, and long-time habits can be hard to change. Your health care provider can recommend:  Nicotine replacement products, such as patches, gum, and nasal sprays. Use these products only as directed. Do not replace cigarette smoking with electronic cigarettes, which are commonly called e-cigarettes. The safety of e-cigarettes is not known, and some may contain harmful chemicals.  Programs and community resources, which may include group support, education, or talk therapy.  Prescription medicines to help reduce cravings.  A combination of two or more quit methods, which will increase the success of quitting.  Where to find  support  Follow the recommendations from your health care provider about support groups and other assistance. You can also visit:  [place] Quitline Consortium: www.naquitline.org or call 2-800-QUIT-NOW.  [place] Department of Health and Human Services: www.smokefree.gov  American Lung Association: www.freedomfromsmoking.org  American Heart Association: www.heart.org  Where to find more information  Centers for Disease Control and Prevention: www.cdc.gov  World Health Organization: www.who.int  Summary  Smoking tobacco is very bad for your health. Tobacco smoke contains many toxic chemicals that can damage every part of the body.  Smoking is difficult to quit because a chemical in tobacco, called nicotine, causes addiction or dependence.  There are immediate and long-term health benefits of quitting smoking.  A combination of two or more quit methods increases the success of quitting.  This information is not intended to replace advice given to you by your health care provider. Make sure you discuss any questions you have with your health care provider.  Document Revised: 08/22/2022 Document Reviewed: 02/01/2021  Elsevier Patient Education © 2022 [employer]     Stress, Adult  Stress is a normal reaction to life events. Stress is what you feel when life demands more than you are used to, or more than you think you can handle.  Some stress can be useful, such as studying for a test or meeting a deadline at work. Stress that occurs too often or for too long can cause problems. Long-lasting stress is called chronic stress. Chronic stress can affect your emotional health and interfere with relationships and normal daily activities.  Too much stress can weaken your body's defense system (immune system) and increase your risk for physical illness. If you already have a medical problem, stress can make it worse.  What are the causes?  All sorts of life events can cause stress. An event that causes stress for one  person may not be stressful for someone else. Major life events, whether positive or negative, commonly cause stress. Examples include:  Losing a job or starting a new job.  Losing a loved one.  Moving to a new town or home.  Getting  or .  Having a baby.  Getting injured or sick.  Less obvious life events can also cause stress, especially if they occur day after day or in combination with each other. Examples include:  Working long hours.  Driving in traffic.  Caring for children.  Being in debt.  Being in a difficult relationship.  What are the signs or symptoms?  Stress can cause emotional and physical symptoms and can lead to unhealthy behaviors. These include the following:  Emotional symptoms  Anxiety. This is feeling worried, afraid, on edge, overwhelmed, or out of control.  Anger, including irritation or impatience.  Depression. This is feeling sad, down, helpless, or guilty.  Trouble focusing, remembering, or making decisions.  Physical symptoms  Aches and pains. These may affect your head, neck, back, stomach, or other areas of your body.  Tight muscles or a clenched jaw.  Low energy.  Trouble sleeping.  Unhealthy behaviors  Eating to feel better (overeating) or skipping meals.  Working too much or putting off tasks.  Smoking, drinking alcohol, or using drugs to feel better.  How is this diagnosed?  A stress disorder is diagnosed through an assessment by your health care provider. A stress disorder may be diagnosed based on:  Your symptoms and any stressful life events.  Your medical history.  Tests to rule out other causes of your symptoms.  Depending on your condition, your health care provider may refer you to a specialist for further evaluation.  How is this treated?  Stress management techniques are the recommended treatment for stress. Medicine is not typically recommended for treating stress.  Techniques to reduce your reaction to stressful life events include:  Identifying stress.  Monitor yourself for symptoms of stress and notice what causes stress for you. These skills may help you to avoid or prepare for stressful events.  Managing time. Set your priorities, keep a calendar of events, and learn to say no. These actions can help you avoid taking on too much.  Techniques for dealing with stress include:  Rethinking the problem. Try to think realistically about stressful events rather than ignoring them or overreacting. Try to find the positives in a stressful situation rather than focusing on the negatives.  Exercise. Physical exercise can release both physical and emotional tension. The key is to find a form of exercise that you enjoy and do it regularly.  Relaxation techniques. These relax the body and mind. Find one or more that you enjoy and use the techniques regularly. Examples include:  Meditation, deep breathing, or progressive relaxation techniques.  Yoga or tavo chi.  Biofeedback, mindfulness techniques, or journaling.  Listening to music, being in nature, or taking part in other hobbies.  Practicing a healthy lifestyle. Eat a balanced diet, drink plenty of water, limit or avoid caffeine, and get plenty of sleep.  Having a strong support network. Spend time with family, friends, or other people you enjoy being around. Express your feelings and talk things over with someone you trust.  Counseling or talk therapy with a mental health provider may help if you are having trouble managing stress by yourself.  Follow these instructions at home:  Lifestyle    Avoid drugs.  Do not use any products that contain nicotine or tobacco. These products include cigarettes, chewing tobacco, and vaping devices, such as e-cigarettes. If you need help quitting, ask your health care provider.  If you drink alcohol:  Limit how much you have to:  0-1 drink a day for women who are not pregnant.  0-2 drinks a day for men.  Know how much alcohol is in a drink. In the [place] one drink equals one 12 oz bottle  of beer (355 mL), one 5 oz glass of wine (148 mL), or one 1½ oz glass of hard liquor (44 mL).  Do not use alcohol or drugs to relax.  Eat a balanced diet that includes fresh fruits and vegetables, whole grains, lean meats, fish, eggs, beans, and low-fat dairy. Avoid processed foods and foods high in added fat, sugar, and salt.  Exercise at least 30 minutes on 5 or more days each week.  Get 7-8 hours of sleep each night.  General instructions    Practice stress management techniques as told by your health care provider.  Drink enough fluid to keep your urine pale yellow.  Take over-the-counter and prescription medicines only as told by your health care provider.  Keep all follow-up visits. This is important.  Contact a health care provider if:  Your symptoms get worse.  You have new symptoms.  You feel overwhelmed by your problems and can no longer manage them by yourself.  Get help right away if:  You have thoughts of hurting yourself or others.  Get help right awayif you feel like you may hurt yourself or others, or have thoughts about taking your own life. Go to your nearest emergency room or:  Call 911.  Call the National Suicide Prevention Lifeline at 1-351.393.8963 or 340. This is open 24 hours a day.  Text the Crisis Text Line at 647406.  Summary  Stress is a normal reaction to life events. It can cause problems if it happens too often or for too long.  Practicing stress management techniques is the best way to treat stress.  Counseling or talk therapy with a mental health provider may help if you are having trouble managing stress by yourself.  This information is not intended to replace advice given to you by your health care provider. Make sure you discuss any questions you have with your health care provider.  Document Revised: 07/28/2022 Document Reviewed: 07/28/2022  Elsevier Patient Education © 2022 [employer]

## 2023-11-17 ENCOUNTER — OFFICE VISIT (OUTPATIENT)
Dept: UROLOGY | Facility: CLINIC | Age: 53
End: 2023-11-17
Payer: COMMERCIAL

## 2023-11-17 VITALS
DIASTOLIC BLOOD PRESSURE: 84 MMHG | HEIGHT: 67 IN | SYSTOLIC BLOOD PRESSURE: 128 MMHG | HEART RATE: 89 BPM | BODY MASS INDEX: 37.76 KG/M2 | WEIGHT: 240.6 LBS

## 2023-11-17 DIAGNOSIS — R30.0 BURNING WITH URINATION: ICD-10-CM

## 2023-11-17 DIAGNOSIS — R10.9 BILATERAL FLANK PAIN: ICD-10-CM

## 2023-11-17 DIAGNOSIS — R33.9 INCOMPLETE BLADDER EMPTYING: ICD-10-CM

## 2023-11-17 DIAGNOSIS — N20.0 RENAL CALCULUS: ICD-10-CM

## 2023-11-17 DIAGNOSIS — R35.0 FREQUENCY OF URINATION: Primary | ICD-10-CM

## 2023-11-17 DIAGNOSIS — N40.0 BPH WITHOUT OBSTRUCTION/LOWER URINARY TRACT SYMPTOMS: ICD-10-CM

## 2023-11-17 LAB
BILIRUB BLD-MCNC: NEGATIVE MG/DL
CLARITY, POC: CLEAR
COLOR UR: YELLOW
EXPIRATION DATE: NORMAL
GLUCOSE UR STRIP-MCNC: NEGATIVE MG/DL
KETONES UR QL: NEGATIVE
LEUKOCYTE EST, POC: NEGATIVE
Lab: NORMAL
NITRITE UR-MCNC: NEGATIVE MG/ML
PH UR: 6.5 [PH] (ref 5–8)
PROT UR STRIP-MCNC: NEGATIVE MG/DL
RBC # UR STRIP: NEGATIVE /UL
SP GR UR: 1.01 (ref 1–1.03)
UROBILINOGEN UR QL: NORMAL

## 2023-11-17 PROCEDURE — 84154 ASSAY OF PSA FREE: CPT | Performed by: NURSE PRACTITIONER

## 2023-11-17 PROCEDURE — 87086 URINE CULTURE/COLONY COUNT: CPT | Performed by: NURSE PRACTITIONER

## 2023-11-17 PROCEDURE — 84153 ASSAY OF PSA TOTAL: CPT | Performed by: NURSE PRACTITIONER

## 2023-11-17 RX ORDER — TAMSULOSIN HYDROCHLORIDE 0.4 MG/1
1 CAPSULE ORAL DAILY
Qty: 90 CAPSULE | Refills: 3 | Status: SHIPPED | OUTPATIENT
Start: 2023-11-17

## 2023-11-17 RX ORDER — PHENAZOPYRIDINE HYDROCHLORIDE 200 MG/1
200 TABLET, FILM COATED ORAL 3 TIMES DAILY PRN
Qty: 30 TABLET | Refills: 0 | Status: SHIPPED | OUTPATIENT
Start: 2023-11-17

## 2023-11-17 NOTE — PROGRESS NOTES
Chief Complaint  BPH WITH LUTS/PSA/MED REFILLS/GERARDO (YEARLY FOLLOW UP)    Subjective          Jaquan Mckay presents to [facility] for BPH WITH LUTS/FLANK PAIN  History of Present Illness    Mr. Jaquan Mckay is a pleasant 52-year-old male who returns to clinic today for evaluation. This is a yearly follow-up with prior complaints of BPH with lower urinary tract symptoms, most bothersome of which was urine frequency, urgency, and constant feeling of incomplete bladder emptying. Patient did have bilateral flank pain with a significant history of kidney stones-RESOLVED.     He was in no apparent discomfort when last evaluated. We did repeat KUB which was completely negative with no stones noted in bilateral kidneys and no ureteral stones evident  ALSO on his prior CT scans.     THE Patient haD been started on tamsulosin 0.4 mg with significant improvement in his symptoms. However, he does have a compliance issue with medications reports taking intermittently. His most recent PSA is at 0.5 and that was completed on 11/16 of 2022, his PSA was a 1.0  completed on 07/01/2021. Patient's PSA was 0.8 four years prior.     On his initial evaluation in clinic today, patient reports he is in apparent discomfort. He has had some dysuria and constant burning with urination. He reports lower back pain, flank pain, without any CVA tenderness. His urinalysis, however, is completely negative for any leukocyte esterase. It is negative for nitrite, it is negative for gross/microscopic hematuria. His PVR is a 0 mL with an IPSS score of 24. Patient consumes exuberant amount of cola products.    OVERALL, The patient states he is NOT doing well. He denies any perineal pain or constant feeling of sitting on eggshells consistent with prostatitis. He confirms urinary frequency and urgency.  He reports nocturia.  He urinates approximately 8 times a day. He gets up approximately 3 times at night to  urinate and this is due to his late night p.o. fluid intake. He takes his medication around 9:00 PM or 10:00 PM. He takes Flomax in the morning. He has dysuria. His urine is dark in color. He drinks 12 bottles of water a day. He states it burns when he voids. He does not drink coffee. He drinks about 3 sodas a day.     The patient states he has not been getting much sleep. He has been having anxiety attacks. He had left ankle surgery 5 months ago. He had 70 staples and 30 stitches. He just started physical therapy again. He has 9 more weeks of therapy left. He has fluid on his left ankle. He has an appointment with his primary care physician in 12/2023. If it does not improve, he will have a nerve block and drainage.    The patient states he started a diet pill in 10/2023. He has lost 8 to 12 pounds.    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    BMI 35.0-35.9,adult 08/15/2017    Chondromalacia, knee 09/12/2017    Achilles tendinitis of both lower extremities 01/08/2018    Muscle spasm of both lower legs 01/08/2018    Personal history of allergy to shellfish 05/03/2018    Sensation of cold in lower extremity 05/03/2018    Varicose vein of leg 05/03/2018    Heart palpitations 05/04/2018    Generalized anxiety disorder 07/31/2018    Chronic elbow pain, right 07/31/2018    Unstable angina 09/25/2018    ASCVD (arteriosclerotic cardiovascular disease) 01/29/2019    Other constipation  "05/22/2019    Class 2 severe obesity due to excess calories with serious comorbidity and body mass index (BMI) of 36.0 to 36.9 in adult 07/16/2019    Bacteremia 09/25/2019    Therapeutic opioid-induced constipation (OIC) 05/21/2020    Rectal bleeding 05/21/2020    Bloating 05/21/2020    History of colon polyps 05/21/2020    Lateral epicondylitis of right elbow 07/15/2020    Psychophysiological insomnia 11/03/2020    Chronic rhinitis 11/03/2020    Vitamin D deficiency 02/08/2021    Renal calculus 07/07/2021    Chronic idiopathic constipation 07/27/2021    Diarrhea 07/27/2021    Bilateral flank pain 01/31/2022    BPH without obstruction/lower urinary tract symptoms 01/31/2022    Incomplete bladder emptying 01/31/2022    Frequency of urination 11/20/2023     Resolved Ambulatory Problems     Diagnosis Date Noted    Arthritis 08/26/2016    Varicocele 12/07/2016    Obesity (BMI 30.0-34.9) 01/03/2017    Shortness of breath 06/26/2018    Chest pain 09/25/2018    Elevated prolactin level 03/12/2019    Generalized abdominal pain 05/21/2020     Past Medical History:   Diagnosis Date    Allergic     Asthma     Body piercing     Clotting disorder 2004    Coronary artery disease     Depression     DVT (deep venous thrombosis)     Elevated cholesterol     Gastric ulcer     GERD (gastroesophageal reflux disease)     H/O migraine     Headache     Heart attack     History of seizures     Hostility     Hypertension     Knee pain, acute     Low back pain     Lyme disease     Migraine     MRSA (methicillin resistant Staphylococcus aureus)     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     Obesity     Poor historian     [place] spotted fever     Sleep apnea     Tattoo     Wears glasses       Objective   Vital Signs:   /84   Pulse 89   Ht 170.2 cm (67.01\")   Wt 109 kg (240 lb 9.6 oz)   BMI 37.67 kg/m²       ROS:   Review of Systems   Constitutional:  Positive for activity change, appetite change and unexpected weight gain. Negative for " diaphoresis, fatigue, fever and unexpected weight loss.   HENT:  Negative for congestion, ear discharge, ear pain, nosebleeds, rhinorrhea, sinus pressure and sore throat.    Eyes:  Negative for blurred vision, double vision, photophobia, pain, redness and visual disturbance.   Respiratory:  Negative for apnea, cough, chest tightness, shortness of breath, wheezing and stridor.    Cardiovascular:  Negative for chest pain and palpitations.   Gastrointestinal:  Positive for abdominal distention and constipation. Negative for abdominal pain, diarrhea, nausea and vomiting.   Endocrine: Negative for polydipsia, polyphagia and polyuria.   Genitourinary:  Positive for difficulty urinating, dysuria, flank pain, frequency, nocturia and urgency. Negative for decreased urine volume, hematuria and urinary incontinence.   Musculoskeletal:  Positive for back pain and myalgias. Negative for arthralgias and joint swelling.   Skin:  Positive for dry skin and skin lesions. Negative for pallor, rash and wound.   Neurological:  Negative for dizziness, tremors, syncope, weakness, light-headedness, memory problem and confusion.   Psychiatric/Behavioral:  Positive for sleep disturbance and stress. Negative for behavioral problems.         Physical Exam  Constitutional:       General: He is in acute distress.      Appearance: He is well-developed. He is obese. He is ill-appearing.   HENT:      Head: Normocephalic and atraumatic.      Right Ear: External ear normal.      Left Ear: External ear normal.   Eyes:      General:         Right eye: No discharge.         Left eye: No discharge.      Conjunctiva/sclera: Conjunctivae normal.      Pupils: Pupils are equal, round, and reactive to light.   Neck:      Thyroid: No thyromegaly.      Trachea: No tracheal deviation.   Cardiovascular:      Rate and Rhythm: Normal rate and regular rhythm.      Heart sounds: No murmur heard.     No friction rub.   Pulmonary:      Effort: Pulmonary effort is  normal. No respiratory distress.      Breath sounds: Normal breath sounds. No stridor.   Abdominal:      General: Bowel sounds are normal. There is distension.      Palpations: Abdomen is soft.      Tenderness: There is abdominal tenderness. There is no guarding.   Genitourinary:     Penis: Normal and uncircumcised. No tenderness or discharge.       Testes: Normal.      Rectum: Normal. Guaiac result negative.      Comments: He reports dysuria, urine frequency, urgency, and burning with urination.  He also has nocturia but PVR is 0 mL.  Musculoskeletal:         General: Tenderness present. No deformity. Normal range of motion.      Cervical back: Normal range of motion and neck supple.   Skin:     General: Skin is warm and dry.      Capillary Refill: Capillary refill takes less than 2 seconds.      Coloration: Skin is pale.   Neurological:      Mental Status: He is alert and oriented to person, place, and time.      Cranial Nerves: No cranial nerve deficit.      Motor: Weakness present.      Coordination: Coordination normal.   Psychiatric:         Behavior: Behavior normal.         Thought Content: Thought content normal.         Judgment: Judgment normal.        Result Review :     UA          11/17/2023    14:03   Urinalysis   Ketones, UA Negative    Leukocytes, UA Negative      Urine Culture          11/17/2023    14:03   Urine Culture   Urine Culture No growth      PSA          11/17/2023    14:16   PSA   PSA 0.6                Assessment and Plan    Problem List Items Addressed This Visit          Gastrointestinal Abdominal     Bilateral flank pain       Genitourinary and Reproductive     Burning with urination    Relevant Medications    tamsulosin (FLOMAX) 0.4 MG capsule 24 hr capsule    phenazopyridine (Pyridium) 200 MG tablet    Renal calculus    BPH without obstruction/lower urinary tract symptoms    Relevant Medications    tamsulosin (FLOMAX) 0.4 MG capsule 24 hr capsule    phenazopyridine (Pyridium) 200  MG tablet    Incomplete bladder emptying    Frequency of urination - Primary    Relevant Medications    tamsulosin (FLOMAX) 0.4 MG capsule 24 hr capsule    phenazopyridine (Pyridium) 200 MG tablet    Other Relevant Orders    POC Urinalysis Dipstick, Automated (Completed)    Urine Culture - Urine, Urine, Clean Catch (Completed)    PSA Total+% Free (Serial) (Completed)                                 ASSESSMENT   BPH WITH LUTS/RIGHT>LEFT FLANK PAIN /HX OF KIDNEY STONES/IBS-C  MR. PATRICIA SMITH IS A 53Y/O Pleasant patient with extensive polypharmacy, and significant past medical history, consistent with back pain, kidney stones, constipation, WHO returns to clinic today for evaluation. This is his yearly follow up. Patient  Reports bothersome urinary symptoms with frequency urgency and dysuria that is becoming unbearable to him.  He denies any lower back pain and abdominal pain. His urine dipstick is completely negative for any infection it is negative for gross hematuria.PVR IS OML, IPSS SCORE IS 24, with a PVR of 0 mL most recent PSA is 0.5 completed on 11/16 or 2022, it was 0.1 in 2021.     BPH: WE Discussed the pathophysiology of BPH and obstruction. We discussed the static and dynamic effects of BPH as well as using 5 alpha reductase inhibitors versus alpha blockade.  We discussed the indications for transurethral surgery as well.  And/ or other therapeutic options available including all of the newer techniques.  He has no real symptomatology referable to his prostate other than moderate enlargement.  Rather than proceed with an expensive workup he doesn't need, at this time I am recommending he continue with an alpha blocker tamsulosin 0.4 mg capsule daily    Again, we discussed detrusor instability which is an  irritative bladder symptomatology most likely related to factors such as intake of bladder irritants, postinfectious irritation, prolapse, with a very large differential diagnosis.  The mainstay of  treatment has been tight cholinergics which basically caused the bladder to have decreased contractility.  We have discussed the side effects of these treatments including dry mouth, double vision, and increasing constipation.  He reports doing well  on Flomax when he takes it, encourage compliance.                                                  PLAN  We did resend his urine for culture due to complaints of frequency urgency, dysuria, burning with urination.    I will call patient results if any positive bacterial growth.  Discussed antibiotic therapy if positive.    Rechecked  PSA today free and total, I will call him with results.    CONTINUE FLOMAX 0.4MG DAILY    PYRIDIUM 200 MG PRN DYSURIA/BURNING WITH URINATION     Discussed to monitor his daily routine with prevention of flank pain by: At least Drinking 8 glasses of water per day, Limiting your alcohol consumption.  Having a healthy diet containing fruits, vegetables, and a lot of fluids, Practicing safe sex.  Also maintaining proper hygiene of HIS body as well as the environment.      We further discussed a kidney stone prevention diet to include increasing p.o. fluid intake, to at least 1 to 2 L of water daily.  He is to avoid caffeine products such as cola, coffee, and to avoid soft or soda drinks.      We also discussed the importance of decreasing his  sodium consumption as in  Fast foods, denny, salted nuts, canned foods, and smoked/cured foods.  He is also to decrease his oxalate consumption, as in spinach, Adrian greens, and Rhubarb.  Also important is to decrease protein intake, as in red meats, peanut butter, and also avoid nuts     He is to continue his daily bowel regiment as recommended by  GI    Continue all other medications as prescribed.     Follow-up in clinic as discussed, may return sooner if worsening.     Patient is agreeable plan of care.    Patient reports that he is not currently experiencing any symptoms of urinary  incontinence.      RADIOLOGY (CT AND/OR KUB):    CT Abdomen and Pelvis: No results found for this or any previous visit.     CT Stone Protocol: No results found for this or any previous visit.     KUB: Results for orders placed during the hospital encounter of 11/16/22    XR Abdomen KUB    Narrative  EXAM:  XR Abdomen, 1 View    EXAM DATE:  11/16/2022 1:32 PM    CLINICAL HISTORY:  kidney stone; N20.0-Calculus of kidney    TECHNIQUE:  Frontal supine view of the abdomen/pelvis.    COMPARISON:  05/18/2022    FINDINGS:  Gastrointestinal tract:  Unremarkable.  No dilation.  Bones/joints:  Unremarkable.    Impression  No renal or ureteral stones identified.    This report was finalized on 11/16/2022 1:55 PM by Dr. Raul Calixto MD.       LABS (3 MONTHS):    Office Visit on 11/17/2023   Component Date Value Ref Range Status    Color 11/17/2023 Yellow  Yellow, Straw, Dark Yellow, Madiha Final    Clarity, UA 11/17/2023 Clear  Clear Final    Specific Gravity  11/17/2023 1.015  1.005 - 1.030 Final    pH, Urine 11/17/2023 6.5  5.0 - 8.0 Final    Leukocytes 11/17/2023 Negative  Negative Final    Nitrite, UA 11/17/2023 Negative  Negative Final    Protein, POC 11/17/2023 Negative  Negative mg/dL Final    Glucose, UA 11/17/2023 Negative  Negative mg/dL Final    Ketones, UA 11/17/2023 Negative  Negative Final    Urobilinogen, UA 11/17/2023 Normal  Normal, 0.2 E.U./dL Final    Bilirubin 11/17/2023 Negative  Negative Final    Blood, UA 11/17/2023 Negative  Negative Final    Lot Number 11/17/2023 [id number]   Final    Expiration Date 11/17/2023 10/25/2024   Final    Urine Culture 11/17/2023 No growth   Final    PSA 11/17/2023 0.6  0.0 - 4.0 ng/mL Final    Roche ECLIA methodology.  According to the American Urological Association, Serum PSA should  decrease and remain at undetectable levels after radical  prostatectomy. The AUA defines biochemical recurrence as an initial  PSA value 0.2 ng/mL or greater followed by a subsequent  confirmatory  PSA value 0.2 ng/mL or greater.  Values obtained with different assay methods or kits cannot be used  interchangeably. Results cannot be interpreted as absolute evidence  of the presence or absence of malignant disease.    PSA, Free 11/17/2023 0.18  N/A ng/mL Final    Roche ECLIA methodology.    PSA, Free % 11/17/2023 30.0  % Final    The table below lists the probability of prostate cancer for  men with non-suspicious GERARDO results and total PSA between  4 and 10 ng/mL, by patient age (Oni et al, ATIF 1998,  279:1542).                    % Free PSA       50-64 yr        65-75 yr                    0.00-10.00%        56%             55%                   10.01-15.00%        24%             35%                   15.01-20.00%        17%             23%                   20.01-25.00%        10%             20%                        >25.00%         5%              9%  Please note:  Oni et al did not make specific                recommendations regarding the use of                percent free PSA for any other population                of men.   Admission on 09/04/2023, Discharged on 09/04/2023   Component Date Value Ref Range Status    COVID19 09/04/2023 Detected (C)  Not Detected - Ref. Range Final    Influenza A PCR 09/04/2023 Not Detected  Not Detected Final    Influenza B PCR 09/04/2023 Not Detected  Not Detected Final    Glucose 09/04/2023 106 (H)  65 - 99 mg/dL Final    BUN 09/04/2023 12  6 - 20 mg/dL Final    Creatinine 09/04/2023 0.98  0.76 - 1.27 mg/dL Final    Sodium 09/04/2023 136  136 - 145 mmol/L Final    Potassium 09/04/2023 4.1  3.5 - 5.2 mmol/L Final    Chloride 09/04/2023 104  98 - 107 mmol/L Final    CO2 09/04/2023 19.8 (L)  22.0 - 29.0 mmol/L Final    Calcium 09/04/2023 9.6  8.6 - 10.5 mg/dL Final    Total Protein 09/04/2023 7.8  6.0 - 8.5 g/dL Final    Albumin 09/04/2023 4.2  3.5 - 5.2 g/dL Final    ALT (SGPT) 09/04/2023 25  1 - 41 U/L Final    AST (SGOT) 09/04/2023 24  1 - 40  U/L Final    Alkaline Phosphatase 09/04/2023 102  39 - 117 U/L Final    Total Bilirubin 09/04/2023 0.3  0.0 - 1.2 mg/dL Final    Globulin 09/04/2023 3.6  gm/dL Final    A/G Ratio 09/04/2023 1.2  g/dL Final    BUN/Creatinine Ratio 09/04/2023 12.2  7.0 - 25.0 Final    Anion Gap 09/04/2023 12.2  5.0 - 15.0 mmol/L Final    eGFR 09/04/2023 93.9  >60.0 mL/min/1.73 Final    C-Reactive Protein 09/04/2023 3.83 (H)  0.00 - 0.50 mg/dL Final    Extra Tube 09/04/2023 Hold for add-ons.   Final    Auto resulted.    Extra Tube 09/04/2023 hold for add-on   Final    Auto resulted    Extra Tube 09/04/2023 Hold for add-ons.   Final    Auto resulted.    Extra Tube 09/04/2023 Hold for add-ons.   Final    Auto resulted    WBC 09/04/2023 7.10  3.40 - 10.80 10*3/mm3 Final    RBC 09/04/2023 4.80  4.14 - 5.80 10*6/mm3 Final    Hemoglobin 09/04/2023 15.7  13.0 - 17.7 g/dL Final    Hematocrit 09/04/2023 45.0  37.5 - 51.0 % Final    MCV 09/04/2023 93.8  79.0 - 97.0 fL Final    MCH 09/04/2023 32.7  26.6 - 33.0 pg Final    MCHC 09/04/2023 34.9  31.5 - 35.7 g/dL Final    RDW 09/04/2023 12.7  12.3 - 15.4 % Final    RDW-SD 09/04/2023 44.2  37.0 - 54.0 fl Final    MPV 09/04/2023 9.6  6.0 - 12.0 fL Final    Platelets 09/04/2023 150  140 - 450 10*3/mm3 Final    Neutrophil % 09/04/2023 67.0  42.7 - 76.0 % Final    Lymphocyte % 09/04/2023 12.0 (L)  19.6 - 45.3 % Final    Monocyte % 09/04/2023 20.0 (H)  5.0 - 12.0 % Final    Bands %  09/04/2023 1.0  0.0 - 5.0 % Final    Neutrophils Absolute 09/04/2023 4.83  1.70 - 7.00 10*3/mm3 Final    Lymphocytes Absolute 09/04/2023 0.85  0.70 - 3.10 10*3/mm3 Final    Monocytes Absolute 09/04/2023 1.42 (H)  0.10 - 0.90 10*3/mm3 Final    RBC Morphology 09/04/2023 Normal  Normal Final    Platelet Morphology 09/04/2023 Normal  Normal Final   Orders Only on 08/28/2023   Component Date Value Ref Range Status    Glucose 09/01/2023 99  65 - 99 mg/dL Final    BUN 09/01/2023 18  6 - 20 mg/dL Final    Creatinine 09/01/2023 0.89   0.76 - 1.27 mg/dL Final    Sodium 09/01/2023 134 (L)  136 - 145 mmol/L Final    Potassium 09/01/2023 4.4  3.5 - 5.2 mmol/L Final    Chloride 09/01/2023 105  98 - 107 mmol/L Final    CO2 09/01/2023 20.0 (L)  22.0 - 29.0 mmol/L Final    Calcium 09/01/2023 8.8  8.6 - 10.5 mg/dL Final    Total Protein 09/01/2023 7.0  6.0 - 8.5 g/dL Final    Albumin 09/01/2023 4.0  3.5 - 5.2 g/dL Final    ALT (SGPT) 09/01/2023 24  1 - 41 U/L Final    AST (SGOT) 09/01/2023 25  1 - 40 U/L Final    Alkaline Phosphatase 09/01/2023 83  39 - 117 U/L Final    Total Bilirubin 09/01/2023 0.2  0.0 - 1.2 mg/dL Final    Globulin 09/01/2023 3.0  gm/dL Final    A/G Ratio 09/01/2023 1.3  g/dL Final    BUN/Creatinine Ratio 09/01/2023 20.2  7.0 - 25.0 Final    Anion Gap 09/01/2023 9.0  5.0 - 15.0 mmol/L Final    eGFR 09/01/2023 104.4  >60.0 mL/min/1.73 Final    Total Cholesterol 09/01/2023 184  0 - 200 mg/dL Final    Triglycerides 09/01/2023 71  0 - 150 mg/dL Final    HDL Cholesterol 09/01/2023 65 (H)  40 - 60 mg/dL Final    LDL Cholesterol  09/01/2023 106 (H)  0 - 100 mg/dL Final    VLDL Cholesterol 09/01/2023 13  5 - 40 mg/dL Final    LDL/HDL Ratio 09/01/2023 1.61   Final    TSH 09/01/2023 1.660  0.270 - 4.200 uIU/mL Final    Hemoglobin A1C 09/01/2023 4.90  4.80 - 5.60 % Final    25 Hydroxy, Vitamin D 09/01/2023 46.6  30.0 - 100.0 ng/ml Final    Free T4 09/01/2023 0.90 (L)  0.93 - 1.70 ng/dL Final    Vitamin B-12 09/01/2023 485  211 - 946 pg/mL Final    Iron 09/01/2023 99  59 - 158 mcg/dL Final    Iron Saturation (TSAT) 09/01/2023 38  20 - 50 % Final    Transferrin 09/01/2023 175 (L)  200 - 360 mg/dL Final    TIBC 09/01/2023 261 (L)  298 - 536 mcg/dL Final    Ferritin 09/01/2023 42.90  30.00 - 400.00 ng/mL Final    Insulin, Free 09/01/2023 6.7  uU/mL Final    Reference Range:  Pubertal Children and  Adults (fasting): 0 - 17    Insulin 09/01/2023 6.7  uU/mL Final    Non-Diabetic:  In the absence of insulin-binding antibodies,  the free and total insulin  assays are equivalent. However,  this assay is intended for use in diabetics with insulin  autoantibody present. Measurement is performed on  acid-treated samples and, therefore, the sensitivity and  absolute values by this method may differ from our direct  insulin ICMA.  Insulin Dependent Diabetic Patients:  Free Insulin levels  vary depending on the capacity and affinity of circulating  insulin-binding antibodies and the dose of insulin given to  the patient.  Total insulin levels represent free insulin  and antibody bound insulin fractions.  This test was developed and its performance characteristics  determined by [facility]. It has not been cleared or approved  by the Food and Drug Administration.    Phenytoin Level 09/01/2023 13.9  10.0 - 20.0 mcg/mL Final    Phenytoin, Free 09/01/2023 1.2  1.0 - 2.0 ug/mL Final                                    Detection Limit = 0.5    WBC 09/01/2023 5.38  3.40 - 10.80 10*3/mm3 Final    RBC 09/01/2023 4.44  4.14 - 5.80 10*6/mm3 Final    Hemoglobin 09/01/2023 14.8  13.0 - 17.7 g/dL Final    Hematocrit 09/01/2023 40.9  37.5 - 51.0 % Final    MCV 09/01/2023 92.1  79.0 - 97.0 fL Final    MCH 09/01/2023 33.3 (H)  26.6 - 33.0 pg Final    MCHC 09/01/2023 36.2 (H)  31.5 - 35.7 g/dL Final    RDW 09/01/2023 12.6  12.3 - 15.4 % Final    RDW-SD 09/01/2023 41.6  37.0 - 54.0 fl Final    MPV 09/01/2023 11.6  6.0 - 12.0 fL Final    Platelets 09/01/2023 170  140 - 450 10*3/mm3 Final    Neutrophil % 09/01/2023 52.7  42.7 - 76.0 % Final    Lymphocyte % 09/01/2023 31.4  19.6 - 45.3 % Final    Monocyte % 09/01/2023 13.4 (H)  5.0 - 12.0 % Final    Eosinophil % 09/01/2023 1.9  0.3 - 6.2 % Final    Basophil % 09/01/2023 0.4  0.0 - 1.5 % Final    Immature Grans % 09/01/2023 0.2  0.0 - 0.5 % Final    Neutrophils, Absolute 09/01/2023 2.84  1.70 - 7.00 10*3/mm3 Final    Lymphocytes, Absolute 09/01/2023 1.69  0.70 - 3.10 10*3/mm3 Final    Monocytes, Absolute 09/01/2023 0.72  0.10 - 0.90 10*3/mm3 Final     Eosinophils, Absolute 09/01/2023 0.10  0.00 - 0.40 10*3/mm3 Final    Basophils, Absolute 09/01/2023 0.02  0.00 - 0.20 10*3/mm3 Final    Immature Grans, Absolute 09/01/2023 0.01  0.00 - 0.05 10*3/mm3 Final    nRBC 09/01/2023 0.0  0.0 - 0.2 /100 WBC Final        IPSS Questionnaire (AUA-7):  Over the past month…    1)  How often have you had a sensation of not emptying your bladder completely after you finish urinating?  5 - Almost always   2)  How often have you had to urinate again less than two hours after you finished urinating? 3 - About half the time   3)  How often have you found you stopped and started again several times when you urinated?  3 - About half the time   4) How difficult have you found it to postpone urination?  5 - Almost always   5) How often have you had a weak urinary stream?  2 - Less than half the time   6) How often have you had to push or strain to begin urination?  3 - About half the time   7) How many times did you most typically get up to urinate from the time you went to bed until the time you got up in the morning?  3 - 3 times   Total Score:  24     1. BPH with obstruction/LUTS  - Continue Flomax 0.4 mg daily.  - Pending urine culture results for definitive plan.  - AZO prescribed.    2. Dysuria  - Pending urine culture results for definitive plan    Follow up in 1 year.      Smoking Cessation Counseling:  Current every day smoker. less than 3 minutes spent counseling. Has reduced tobacco use.  I advised patient to quit tobacco use and offered support.  I provided patient with tobacco cessation educational material printed in the patient's After Visit Summary.     Follow Up   Return in about 53 weeks (around 11/22/2024) for Next scheduled follow up, FOR  BPH W LUTS/PSA/-PROSTATE GERARDO/MED REFILLS/LABS.    Patient was given instructions and counseling regarding his condition or for health maintenance advice. Please see specific information pulled into the AVS if appropriate.           This document has been electronically signed by Griselda Cheng-Akwa, APRN   November 20, 2023 19:53 EST      Dictated Utilizing Dragon Dictation: Part of this note may be an electronic transcription/translation of spoken language to printed text using the Dragon Dictation System.     Transcribed from ambient dictation for Griselda Cheng-Akwa, APRN by Payton Varghese.  11/17/23   15:14 EST    Patient or patient representative verbalized consent to the visit recording.  I have personally performed the services described in this document as transcribed by the above individual, and it is both accurate and complete.

## 2023-11-18 LAB — BACTERIA SPEC AEROBE CULT: NO GROWTH

## 2023-11-20 PROBLEM — R35.0 FREQUENCY OF URINATION: Status: ACTIVE | Noted: 2023-11-20

## 2023-11-20 LAB
PSA FREE MFR SERPL: 30 %
PSA FREE SERPL-MCNC: 0.18 NG/ML
PSA SERPL-MCNC: 0.6 NG/ML (ref 0–4)

## 2023-11-21 ENCOUNTER — TELEPHONE (OUTPATIENT)
Dept: UROLOGY | Facility: CLINIC | Age: 53
End: 2023-11-21
Payer: COMMERCIAL

## 2023-11-21 NOTE — TELEPHONE ENCOUNTER
I called the pt and made him aware of his labs    ----- Message from Griselda Cheng-Akwa, APRN sent at 11/20/2023  4:20 PM EST -----  Please let patient know his PSA results is 0.6 with a free PSA of 30.0% at this time follow-up in clinic in 1 year May return sooner if need be.  Thank you

## 2023-11-29 ENCOUNTER — HOSPITAL ENCOUNTER (EMERGENCY)
Facility: HOSPITAL | Age: 53
Discharge: HOME | End: 2023-11-29
Attending: EMERGENCY MEDICINE
Payer: COMMERCIAL

## 2023-11-29 ENCOUNTER — APPOINTMENT (OUTPATIENT)
Dept: CT IMAGING | Facility: HOSPITAL | Age: 53
End: 2023-11-29
Payer: COMMERCIAL

## 2023-11-29 VITALS
TEMPERATURE: 98.4 F | WEIGHT: 232 LBS | OXYGEN SATURATION: 99 % | RESPIRATION RATE: 17 BRPM | BODY MASS INDEX: 36.41 KG/M2 | HEIGHT: 67 IN | SYSTOLIC BLOOD PRESSURE: 128 MMHG | HEART RATE: 98 BPM | DIASTOLIC BLOOD PRESSURE: 84 MMHG

## 2023-11-29 DIAGNOSIS — A08.4 VIRAL GASTROENTERITIS: Primary | ICD-10-CM

## 2023-11-29 LAB
ADV 40+41 DNA STL QL NAA+NON-PROBE: NOT DETECTED
ALBUMIN SERPL-MCNC: 4.3 G/DL (ref 3.5–5.2)
ALBUMIN/GLOB SERPL: 1.2 G/DL
ALP SERPL-CCNC: 108 U/L (ref 39–117)
ALT SERPL W P-5'-P-CCNC: 28 U/L (ref 1–41)
ANION GAP SERPL CALCULATED.3IONS-SCNC: 12.9 MMOL/L (ref 5–15)
AST SERPL-CCNC: 26 U/L (ref 1–40)
ASTRO TYP 1-8 RNA STL QL NAA+NON-PROBE: NOT DETECTED
BACTERIA UR QL AUTO: ABNORMAL /HPF
BASOPHILS # BLD AUTO: 0.02 10*3/MM3 (ref 0–0.2)
BASOPHILS NFR BLD AUTO: 0.2 % (ref 0–1.5)
BILIRUB SERPL-MCNC: 0.5 MG/DL (ref 0–1.2)
BILIRUB UR QL STRIP: ABNORMAL
BUN SERPL-MCNC: 15 MG/DL (ref 6–20)
BUN/CREAT SERPL: 15.5 (ref 7–25)
C CAYETANENSIS DNA STL QL NAA+NON-PROBE: NOT DETECTED
C COLI+JEJ+UPSA DNA STL QL NAA+NON-PROBE: NOT DETECTED
CALCIUM SPEC-SCNC: 9 MG/DL (ref 8.6–10.5)
CHLORIDE SERPL-SCNC: 102 MMOL/L (ref 98–107)
CLARITY UR: CLEAR
CO2 SERPL-SCNC: 22.1 MMOL/L (ref 22–29)
COLOR UR: ABNORMAL
CREAT SERPL-MCNC: 0.97 MG/DL (ref 0.76–1.27)
CRYPTOSP DNA STL QL NAA+NON-PROBE: NOT DETECTED
DEPRECATED RDW RBC AUTO: 45 FL (ref 37–54)
E HISTOLYT DNA STL QL NAA+NON-PROBE: NOT DETECTED
EAEC PAA PLAS AGGR+AATA ST NAA+NON-PRB: NOT DETECTED
EC STX1+STX2 GENES STL QL NAA+NON-PROBE: NOT DETECTED
EGFRCR SERPLBLD CKD-EPI 2021: 94.5 ML/MIN/1.73
EOSINOPHIL # BLD AUTO: 0.08 10*3/MM3 (ref 0–0.4)
EOSINOPHIL NFR BLD AUTO: 0.8 % (ref 0.3–6.2)
EPEC EAE GENE STL QL NAA+NON-PROBE: NOT DETECTED
ERYTHROCYTE [DISTWIDTH] IN BLOOD BY AUTOMATED COUNT: 12.7 % (ref 12.3–15.4)
ETEC LTA+ST1A+ST1B TOX ST NAA+NON-PROBE: NOT DETECTED
G LAMBLIA DNA STL QL NAA+NON-PROBE: NOT DETECTED
GLOBULIN UR ELPH-MCNC: 3.5 GM/DL
GLUCOSE SERPL-MCNC: 106 MG/DL (ref 65–99)
GLUCOSE UR STRIP-MCNC: NEGATIVE MG/DL
HCT VFR BLD AUTO: 48.9 % (ref 37.5–51)
HGB BLD-MCNC: 16.8 G/DL (ref 13–17.7)
HGB UR QL STRIP.AUTO: NEGATIVE
HOLD SPECIMEN: NORMAL
HOLD SPECIMEN: NORMAL
HYALINE CASTS UR QL AUTO: ABNORMAL /LPF
IMM GRANULOCYTES # BLD AUTO: 0.02 10*3/MM3 (ref 0–0.05)
IMM GRANULOCYTES NFR BLD AUTO: 0.2 % (ref 0–0.5)
KETONES UR QL STRIP: NEGATIVE
LEUKOCYTE ESTERASE UR QL STRIP.AUTO: ABNORMAL
LIPASE SERPL-CCNC: 26 U/L (ref 13–60)
LYMPHOCYTES # BLD AUTO: 1.19 10*3/MM3 (ref 0.7–3.1)
LYMPHOCYTES NFR BLD AUTO: 11.3 % (ref 19.6–45.3)
MCH RBC QN AUTO: 32.9 PG (ref 26.6–33)
MCHC RBC AUTO-ENTMCNC: 34.4 G/DL (ref 31.5–35.7)
MCV RBC AUTO: 95.7 FL (ref 79–97)
MONOCYTES # BLD AUTO: 0.97 10*3/MM3 (ref 0.1–0.9)
MONOCYTES NFR BLD AUTO: 9.2 % (ref 5–12)
NEUTROPHILS NFR BLD AUTO: 78.3 % (ref 42.7–76)
NEUTROPHILS NFR BLD AUTO: 8.25 10*3/MM3 (ref 1.7–7)
NITRITE UR QL STRIP: POSITIVE
NOROVIRUS GI+II RNA STL QL NAA+NON-PROBE: DETECTED
NRBC BLD AUTO-RTO: 0 /100 WBC (ref 0–0.2)
P SHIGELLOIDES DNA STL QL NAA+NON-PROBE: NOT DETECTED
PH UR STRIP.AUTO: <=5 [PH] (ref 5–8)
PLATELET # BLD AUTO: 178 10*3/MM3 (ref 140–450)
PMV BLD AUTO: 9.6 FL (ref 6–12)
POTASSIUM SERPL-SCNC: 4.1 MMOL/L (ref 3.5–5.2)
PROT SERPL-MCNC: 7.8 G/DL (ref 6–8.5)
PROT UR QL STRIP: NEGATIVE
RBC # BLD AUTO: 5.11 10*6/MM3 (ref 4.14–5.8)
RBC # UR STRIP: ABNORMAL /HPF
REF LAB TEST METHOD: ABNORMAL
RVA RNA STL QL NAA+NON-PROBE: NOT DETECTED
S ENT+BONG DNA STL QL NAA+NON-PROBE: NOT DETECTED
SAPO I+II+IV+V RNA STL QL NAA+NON-PROBE: NOT DETECTED
SHIGELLA SP+EIEC IPAH ST NAA+NON-PROBE: NOT DETECTED
SODIUM SERPL-SCNC: 137 MMOL/L (ref 136–145)
SP GR UR STRIP: >=1.03 (ref 1–1.03)
SQUAMOUS #/AREA URNS HPF: ABNORMAL /HPF
UROBILINOGEN UR QL STRIP: ABNORMAL
V CHOL+PARA+VUL DNA STL QL NAA+NON-PROBE: NOT DETECTED
V CHOLERAE DNA STL QL NAA+NON-PROBE: NOT DETECTED
WBC # UR STRIP: ABNORMAL /HPF
WBC NRBC COR # BLD AUTO: 10.53 10*3/MM3 (ref 3.4–10.8)
WHOLE BLOOD HOLD COAG: NORMAL
WHOLE BLOOD HOLD SPECIMEN: NORMAL
Y ENTEROCOL DNA STL QL NAA+NON-PROBE: NOT DETECTED

## 2023-11-29 PROCEDURE — 74176 CT ABD & PELVIS W/O CONTRAST: CPT | Performed by: RADIOLOGY

## 2023-11-29 PROCEDURE — 96374 THER/PROPH/DIAG INJ IV PUSH: CPT

## 2023-11-29 PROCEDURE — 74176 CT ABD & PELVIS W/O CONTRAST: CPT

## 2023-11-29 PROCEDURE — 81001 URINALYSIS AUTO W/SCOPE: CPT | Performed by: PHYSICIAN ASSISTANT

## 2023-11-29 PROCEDURE — 83690 ASSAY OF LIPASE: CPT | Performed by: PHYSICIAN ASSISTANT

## 2023-11-29 PROCEDURE — 25010000002 ONDANSETRON PER 1 MG: Performed by: PHYSICIAN ASSISTANT

## 2023-11-29 PROCEDURE — 36415 COLL VENOUS BLD VENIPUNCTURE: CPT

## 2023-11-29 PROCEDURE — 85025 COMPLETE CBC W/AUTO DIFF WBC: CPT | Performed by: PHYSICIAN ASSISTANT

## 2023-11-29 PROCEDURE — 80053 COMPREHEN METABOLIC PANEL: CPT | Performed by: PHYSICIAN ASSISTANT

## 2023-11-29 PROCEDURE — 25810000003 SODIUM CHLORIDE 0.9 % SOLUTION: Performed by: PHYSICIAN ASSISTANT

## 2023-11-29 PROCEDURE — 87507 IADNA-DNA/RNA PROBE TQ 12-25: CPT | Performed by: PHYSICIAN ASSISTANT

## 2023-11-29 PROCEDURE — 99284 EMERGENCY DEPT VISIT MOD MDM: CPT

## 2023-11-29 RX ORDER — ONDANSETRON 4 MG/1
4 TABLET, ORALLY DISINTEGRATING ORAL EVERY 8 HOURS PRN
Qty: 15 TABLET | Refills: 0 | Status: SHIPPED | OUTPATIENT
Start: 2023-11-29

## 2023-11-29 RX ORDER — ONDANSETRON 2 MG/ML
4 INJECTION INTRAMUSCULAR; INTRAVENOUS ONCE
Status: COMPLETED | OUTPATIENT
Start: 2023-11-29 | End: 2023-11-29

## 2023-11-29 RX ORDER — SODIUM CHLORIDE 0.9 % (FLUSH) 0.9 %
10 SYRINGE (ML) INJECTION AS NEEDED
Status: DISCONTINUED | OUTPATIENT
Start: 2023-11-29 | End: 2023-11-30 | Stop reason: HOSPADM

## 2023-11-29 RX ORDER — LOPERAMIDE HYDROCHLORIDE 2 MG/1
2 CAPSULE ORAL 4 TIMES DAILY PRN
Qty: 12 CAPSULE | Refills: 0 | Status: SHIPPED | OUTPATIENT
Start: 2023-11-29

## 2023-11-29 RX ADMIN — ONDANSETRON 4 MG: 2 INJECTION INTRAMUSCULAR; INTRAVENOUS at 21:28

## 2023-11-29 RX ADMIN — SODIUM CHLORIDE 1000 ML: 9 INJECTION, SOLUTION INTRAVENOUS at 20:03

## 2023-11-30 NOTE — ED PROVIDER NOTES
"Subjective   History of Present Illness  53-year-old male with past medical history of allergies, anxiety, arthritis, asthma, coronary artery disease, DVT, depression hypercholesterolemia, peptic ulcer disease, GERD, sleep apnea, [place] spotted fever, migraines, and hypertension presents to the emergency room with abdominal pain, nausea, vomiting, and diarrhea which began last evening.  Patient states that he went to [facility] and after eating chicken became very sick.  He states that he thinks he may have food poisoning.  He denies fever, chills, body aches.  Denies being around any sick contacts or travel.  Denies any specific aggravating or alleviating factors.  Denies any other complaints or concerns at this time.    History provided by:  Patient   used: No        Review of Systems   Constitutional: Negative.  Negative for fever.   HENT: Negative.     Respiratory: Negative.     Cardiovascular: Negative.  Negative for chest pain.   Gastrointestinal:  Positive for abdominal pain, diarrhea, nausea and vomiting.   Endocrine: Negative.    Genitourinary: Negative.  Negative for dysuria.   Skin: Negative.    Neurological: Negative.    Psychiatric/Behavioral: Negative.     All other systems reviewed and are negative.      Past Medical History:   Diagnosis Date    Allergic     Anxiety     Arthritis     Asthma     Body piercing     REPORTS CYLICONE IN EARS    Clotting disorder 2004    had a knee surgery    Coronary artery disease     Depression     DVT (deep venous thrombosis)     RIGHT RIGHT KNEE AFTER SURGERY YEARS AGO IN 2001 OR 2004    Elevated cholesterol     Gastric ulcer     GERD (gastroesophageal reflux disease)     H/O migraine     Headache     Heart attack     REPORTS \"LIGHT HEART ATTACK A LONG TIME AGO\"  \"EARLY 90'S\"    History of seizures     REPORTS LAST EPISODE WAS AROUND 1995.    Hostility     Hyperlipidemia     Hypertension     Knee pain, acute     Left    Low back pain     " "Lyme disease     Migraine     MRSA (methicillin resistant Staphylococcus aureus)     REPORTS LAST TESTED + 2004. WAS TREATED HE REPORTS.  RIGHT ARM, RIGHT KNEE.    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     Obesity     Poor historian     [place] spotted fever     Seizures     Sleep apnea     Tattoo     Wears glasses        Allergies   Allergen Reactions    Ciprofloxacin Anaphylaxis and Hives    Miralax [Polyethylene Glycol] Itching and Rash    Mobic [Meloxicam] Other (See Comments)     Pt states, \"It make my feet and hands go numb and I can't hardly walk.\"     Paxil [Paroxetine Hcl] Shortness Of Breath     Chest pain     Peanut-Containing Drug Products Anaphylaxis    Penicillins Anaphylaxis    Pristiq [Desvenlafaxine Succinate Er] Dizziness    Sulfa Antibiotics Anaphylaxis, Itching and Rash    Doxycycline Hives    Fish-Derived Products Hives    Isosorbide Nitrate Rash     Rash, hives, had to use inhaler.     Movantik [Naloxegol] Rash    Seroquel [Quetiapine] Hives and Rash    Buspar [Buspirone] Rash    Clarithromycin Rash    Clindamycin/Lincomycin Rash    Codeine Rash    Contrast Dye (Echo Or Unknown Ct/Mr) Itching and Rash    Diltiazem Rash    Flomax [Tamsulosin] Hives    Gabapentin Rash    Iodinated Contrast Media Itching and Rash    Keflex [Cephalexin] Rash    Levocetirizine Rash    Linzess [Linaclotide] Rash    Metoprolol Rash    Prednisone Rash and Other (See Comments)     Face, feet, and legs go completely numb per patient    Robitussin Cough+ Chest Max St [Dextromethorphan-Guaifenesin] Itching    Shrimp (Diagnostic) Rash    Spironolactone Rash    Viibryd [Vilazodone Hcl] Itching and Rash    Zoloft [Sertraline Hcl] Hives and Itching       Past Surgical History:   Procedure Laterality Date    ABDOMINAL SURGERY      BACK SURGERY      BRAIN SURGERY  1986    Tumor removal     CARDIAC CATHETERIZATION N/A 9/28/2018    Procedure: Left Heart Cath;  Surgeon: Leandro Daily MD;  Location: [facility] CATH INVASIVE LOCATION;  Service: " Cardiology    CHOLECYSTECTOMY      COLONOSCOPY      COLONOSCOPY N/A 8/2/2021    Procedure: COLONOSCOPY FOR SCREENING;  Surgeon: Irving Azar MD;  Location: [facility] OR;  Service: Gastroenterology;  Laterality: N/A;    CYST REMOVAL      pilonidal cyst    ELBOW EPICONDYLECTOMY Right 7/23/2020    Procedure: LATERAL EPICONDYLAR RELEASE;  Surgeon: Jose Ruiz MD;  Location: [facility] OR;  Service: Orthopedics;  Laterality: Right;    ENDOSCOPY      ENDOSCOPY N/A 8/2/2021    Procedure: ESOPHAGOGASTRODUODENOSCOPY WITH BIOPSY;  Surgeon: Irving Azar MD;  Location: [facility] OR;  Service: Gastroenterology;  Laterality: N/A;  esophageal dilatation to 20mm    FRACTURE SURGERY Right     elbow    KNEE ARTHROSCOPY Left 10/20/2017    Procedure: Diagnostic arthroscopy left knee with chondroplasty;  Surgeon: Marco Aguirre MD;  Location: [facility] OR;  Service:     KNEE ARTHROSCOPY Left 1/11/2021    Procedure: KNEE DIAGNOSTIC ARTHROSCOPY WITH  CHONDROPLASTY patella, femoral and medial;  Surgeon: Raul Eagle MD;  Location: [facility] OR;  Service: Orthopedics;  Laterality: Left;    KNEE SURGERY Right     MOUTH SURGERY      FULL MOUTH EXTRACTION    OTHER SURGICAL HISTORY      REPORTS 7 TICKS REMOVED FROM RIGHT ARM IN 2001 OR 2002    TENNIS ELBOW RELEASE Right 7/23/2020    Procedure: RIGHT TENNIS ELBOW RELEASE;  Surgeon: Jose Ruiz MD;  Location: [facility] OR;  Service: Orthopedics;  Laterality: Right;    TUMOR EXCISION      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       Family History   Problem Relation Age of Onset    Diabetes Mother     Hypertension Mother     Stroke Mother     Diabetes Father     Skin cancer Father     Hypertension Father     Heart attack Father     Diabetes Brother     Hypertension Brother     Heart disease Maternal Aunt     Heart disease Maternal Uncle     Heart disease Paternal Aunt     Heart disease Paternal Uncle     Heart disease Maternal Grandmother     Heart  disease Maternal Grandfather     Heart disease Paternal Grandmother     Heart disease Paternal Grandfather        Social History     Socioeconomic History    Marital status:      Spouse name: Becca    Number of children: 2    Years of education: 12   Tobacco Use    Smoking status: Every Day     Packs/day: 1.00     Years: 17.00     Additional pack years: 0.00     Total pack years: 17.00     Types: Cigars, Cigarettes     Start date: 4/11/2022     Passive exposure: Current    Smokeless tobacco: Never   Vaping Use    Vaping Use: Never used   Substance and Sexual Activity    Alcohol use: No    Drug use: No    Sexual activity: Defer     Partners: Female     Birth control/protection: None           Objective   Physical Exam  Vitals and nursing note reviewed.   Constitutional:       General: He is not in acute distress.     Appearance: He is well-developed. He is not diaphoretic.   HENT:      Head: Normocephalic and atraumatic.      Right Ear: External ear normal.      Left Ear: External ear normal.      Nose: Nose normal.   Eyes:      Conjunctiva/sclera: Conjunctivae normal.      Pupils: Pupils are equal, round, and reactive to light.   Neck:      Vascular: No JVD.      Trachea: No tracheal deviation.   Cardiovascular:      Rate and Rhythm: Regular rhythm. Tachycardia present.      Heart sounds: Normal heart sounds. No murmur heard.  Pulmonary:      Effort: Pulmonary effort is normal. No respiratory distress.      Breath sounds: Normal breath sounds. No wheezing.   Abdominal:      General: Bowel sounds are normal.      Palpations: Abdomen is soft.      Tenderness: There is no abdominal tenderness.   Musculoskeletal:         General: No deformity. Normal range of motion.      Cervical back: Normal range of motion and neck supple.   Skin:     General: Skin is warm and dry.      Coloration: Skin is not pale.      Findings: No erythema or rash.   Neurological:      Mental Status: He is alert and oriented to person,  place, and time.      Cranial Nerves: No cranial nerve deficit.   Psychiatric:         Behavior: Behavior normal.         Thought Content: Thought content normal.         Procedures           ED Course  ED Course as of 11/29/23 2206 Wed Nov 29, 2023 2106 CT Abdomen Pelvis Without Contrast [TK]      ED Course User Index  [TK] Alanna Rogers PA-C                                 Results for orders placed or performed during the hospital encounter of 11/29/23   Comprehensive Metabolic Panel    Specimen: Hand, Right; Blood   Result Value Ref Range    Glucose 106 (H) 65 - 99 mg/dL    BUN 15 6 - 20 mg/dL    Creatinine 0.97 0.76 - 1.27 mg/dL    Sodium 137 136 - 145 mmol/L    Potassium 4.1 3.5 - 5.2 mmol/L    Chloride 102 98 - 107 mmol/L    CO2 22.1 22.0 - 29.0 mmol/L    Calcium 9.0 8.6 - 10.5 mg/dL    Total Protein 7.8 6.0 - 8.5 g/dL    Albumin 4.3 3.5 - 5.2 g/dL    ALT (SGPT) 28 1 - 41 U/L    AST (SGOT) 26 1 - 40 U/L    Alkaline Phosphatase 108 39 - 117 U/L    Total Bilirubin 0.5 0.0 - 1.2 mg/dL    Globulin 3.5 gm/dL    A/G Ratio 1.2 g/dL    BUN/Creatinine Ratio 15.5 7.0 - 25.0    Anion Gap 12.9 5.0 - 15.0 mmol/L    eGFR 94.5 >60.0 mL/min/1.73   Lipase    Specimen: Hand, Right; Blood   Result Value Ref Range    Lipase 26 13 - 60 U/L   Urinalysis With Microscopic If Indicated (No Culture) - Urine, Clean Catch    Specimen: Urine, Clean Catch   Result Value Ref Range    Color, UA Orange (A) Yellow, Straw    Appearance, UA Clear Clear    pH, UA <=5.0 5.0 - 8.0    Specific Gravity, UA >=1.030 1.005 - 1.030    Glucose, UA Negative Negative    Ketones, UA Negative Negative    Bilirubin, UA Small (1+) (A) Negative    Blood, UA Negative Negative    Protein, UA Negative Negative    Leuk Esterase, UA Small (1+) (A) Negative    Nitrite, UA Positive (A) Negative    Urobilinogen, UA 1.0 E.U./dL 0.2 - 1.0 E.U./dL   CBC Auto Differential    Specimen: Hand, Right; Blood   Result Value Ref Range    WBC 10.53 3.40 - 10.80 10*3/mm3     RBC 5.11 4.14 - 5.80 10*6/mm3    Hemoglobin 16.8 13.0 - 17.7 g/dL    Hematocrit 48.9 37.5 - 51.0 %    MCV 95.7 79.0 - 97.0 fL    MCH 32.9 26.6 - 33.0 pg    MCHC 34.4 31.5 - 35.7 g/dL    RDW 12.7 12.3 - 15.4 %    RDW-SD 45.0 37.0 - 54.0 fl    MPV 9.6 6.0 - 12.0 fL    Platelets 178 140 - 450 10*3/mm3    Neutrophil % 78.3 (H) 42.7 - 76.0 %    Lymphocyte % 11.3 (L) 19.6 - 45.3 %    Monocyte % 9.2 5.0 - 12.0 %    Eosinophil % 0.8 0.3 - 6.2 %    Basophil % 0.2 0.0 - 1.5 %    Immature Grans % 0.2 0.0 - 0.5 %    Neutrophils, Absolute 8.25 (H) 1.70 - 7.00 10*3/mm3    Lymphocytes, Absolute 1.19 0.70 - 3.10 10*3/mm3    Monocytes, Absolute 0.97 (H) 0.10 - 0.90 10*3/mm3    Eosinophils, Absolute 0.08 0.00 - 0.40 10*3/mm3    Basophils, Absolute 0.02 0.00 - 0.20 10*3/mm3    Immature Grans, Absolute 0.02 0.00 - 0.05 10*3/mm3    nRBC 0.0 0.0 - 0.2 /100 WBC   Urinalysis, Microscopic Only - Urine, Clean Catch    Specimen: Urine, Clean Catch   Result Value Ref Range    RBC, UA 0-2 None Seen, 0-2 /HPF    WBC, UA 3-5 (A) None Seen, 0-2 /HPF    Bacteria, UA 1+ (A) None Seen /HPF    Squamous Epithelial Cells, UA None Seen None Seen, 0-2 /HPF    Hyaline Casts, UA None Seen None Seen /LPF    Methodology Automated Microscopy    Green Top (Gel)   Result Value Ref Range    Extra Tube Hold for add-ons.    Lavender Top   Result Value Ref Range    Extra Tube hold for add-on    Gold Top - SST   Result Value Ref Range    Extra Tube Hold for add-ons.    Light Blue Top   Result Value Ref Range    Extra Tube Hold for add-ons.      *Note: Due to a large number of results and/or encounters for the requested time period, some results have not been displayed. A complete set of results can be found in Results Review.       CT Abdomen Pelvis Without Contrast   Final Result   Impression:   1. No acute process.           This report was finalized on 11/29/2023 8:30 PM by Jose Ramon Irby DO.                          Medical Decision Making  Problems  Addressed:  Viral gastroenteritis: complicated acute illness or injury    Amount and/or Complexity of Data Reviewed  Labs: ordered.  Radiology: ordered. Decision-making details documented in ED Course.    Risk  Prescription drug management.        Final diagnoses:   Viral gastroenteritis       ED Disposition  ED Disposition       ED Disposition   Discharge    Condition   Stable    Comment   --               Tiera Valenzuela, APRN  [address] [facility] [address] [phone]    In 2 days           Medication List        New Prescriptions      loperamide 2 MG capsule  Commonly known as: IMODIUM  Take 1 capsule by mouth 4 (Four) Times a Day As Needed for Diarrhea.     ondansetron ODT 4 MG disintegrating tablet  Commonly known as: ZOFRAN-ODT  Place 1 tablet on the tongue Every 8 (Eight) Hours As Needed for Nausea or Vomiting.               Where to Get Your Medications        These medications were sent to [facility] - [place] [facility] [address] [phone]  - [phone]   [address] [place] [address]      Phone: [phone]   loperamide 2 MG capsule  ondansetron ODT 4 MG disintegrating tablet            Alanna Rogres PA-C  11/29/23 6321

## 2023-12-04 ENCOUNTER — TRANSCRIBE ORDERS (OUTPATIENT)
Dept: PHYSICAL THERAPY | Facility: CLINIC | Age: 53
End: 2023-12-04
Payer: COMMERCIAL

## 2023-12-04 ENCOUNTER — TREATMENT (OUTPATIENT)
Dept: PHYSICAL THERAPY | Facility: CLINIC | Age: 53
End: 2023-12-04
Payer: COMMERCIAL

## 2023-12-04 DIAGNOSIS — Z98.890 HISTORY OF ANKLE SURGERY: ICD-10-CM

## 2023-12-04 DIAGNOSIS — M25.572 CHRONIC PAIN OF LEFT ANKLE: ICD-10-CM

## 2023-12-04 DIAGNOSIS — G89.29 CHRONIC PAIN OF LEFT ANKLE: ICD-10-CM

## 2023-12-04 DIAGNOSIS — S86.312D PERONEAL TENDON TEAR, LEFT, SUBSEQUENT ENCOUNTER: Primary | ICD-10-CM

## 2023-12-04 DIAGNOSIS — Z98.890 S/P PERONEAL TENDON REPAIR: Primary | ICD-10-CM

## 2023-12-04 DIAGNOSIS — M25.672 DECREASED RANGE OF MOTION OF LEFT ANKLE: ICD-10-CM
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
## 2023-12-07 ENCOUNTER — TREATMENT (OUTPATIENT)
Dept: PHYSICAL THERAPY | Facility: CLINIC | Age: 53
End: 2023-12-07
Payer: COMMERCIAL

## 2023-12-07 DIAGNOSIS — M25.672 DECREASED RANGE OF MOTION OF LEFT ANKLE: ICD-10-CM

## 2023-12-07 DIAGNOSIS — S86.312D PERONEAL TENDON TEAR, LEFT, SUBSEQUENT ENCOUNTER: ICD-10-CM

## 2023-12-07 DIAGNOSIS — Z98.890 HISTORY OF ANKLE SURGERY: ICD-10-CM

## 2023-12-07 DIAGNOSIS — M25.572 CHRONIC PAIN OF LEFT ANKLE: Primary | ICD-10-CM

## 2023-12-07 DIAGNOSIS — G89.29 CHRONIC PAIN OF LEFT ANKLE: Primary | ICD-10-CM

## 2023-12-07 NOTE — PROGRESS NOTES
Physical Therapy Daily Treatment Note      Patient: Jqauan Mckay   : 1970  Referring practitioner: Khurram Florez DPM  Date of Initial Visit: Type: THERAPY  Noted: 2023  Today's Date: 2023  Patient seen for 2 sessions       Visit Diagnoses:    ICD-10-CM ICD-9-CM   1. Chronic pain of left ankle  M25.572 719.47    G89.29 338.29   2. Decreased range of motion of left ankle  M25.672 719.57   3. History of ankle surgery  Z98.890 V45.89   4. Peroneal tendon tear, left, subsequent encounter  S86.312D V58.89     844.8       Subjective Evaluation    History of Present Illness    Subjective comment: Pt has 7/10 pain today.     Objective   See Exercise, Manual, and Modality Logs for complete treatment.       Assessment & Plan       Assessment  Assessment details: Tx today consisted of ankle stretching; followed by standing exercises and proprioceptive training for improved balance and ended with ice for decreased pain.  Pt demonstrated good effort today with noted improved balance.  Pt reported 5/10 post pain.    Plan  Plan details: Will follow progressing leg stability and improved balance and gait.          Timed:         Manual Therapy:         mins  10032;     Therapeutic Exercise:    16     mins  94265;     Neuromuscular Jazmin:    14    mins  00068;    Therapeutic Activity:     14     mins  92839;     Gait Training:           mins  55857;     Ultrasound:          mins  30218;    Ionto                                   mins   72313  Self Care                            mins   93360  Canalith Repos         mins 21707      Un-Timed:  Electrical Stimulation:         mins  29583 (MC );  Dry Needling          mins self-pay  Traction          mins 74554      Timed Treatment:  44    mins   Total Treatment:     50   mins(6min ice)    Win Rodriguez PT  [place] License: [id number]      Electronically signed by Win Rodriguez PT, 23, 2:54 PM EST

## 2023-12-11 ENCOUNTER — TREATMENT (OUTPATIENT)
Dept: PHYSICAL THERAPY | Facility: CLINIC | Age: 53
End: 2023-12-11
Payer: COMMERCIAL

## 2023-12-11 DIAGNOSIS — Z98.890 HISTORY OF ANKLE SURGERY: ICD-10-CM

## 2023-12-11 DIAGNOSIS — G89.29 CHRONIC PAIN OF LEFT ANKLE: Primary | ICD-10-CM

## 2023-12-11 DIAGNOSIS — M25.672 DECREASED RANGE OF MOTION OF LEFT ANKLE: ICD-10-CM

## 2023-12-11 DIAGNOSIS — M25.572 CHRONIC PAIN OF LEFT ANKLE: Primary | ICD-10-CM

## 2023-12-11 DIAGNOSIS — S86.312D PERONEAL TENDON TEAR, LEFT, SUBSEQUENT ENCOUNTER: ICD-10-CM

## 2023-12-11 NOTE — PROGRESS NOTES
Physical Therapy Daily Treatment Note      Patient: Jaquan Mckay   : 1970  Referring practitioner: Khurram Florez DPM  Date of Initial Visit: Type: THERAPY  Noted: 2023  Today's Date: 2023  Patient seen for 3 sessions       Visit Diagnoses:    ICD-10-CM ICD-9-CM   1. Chronic pain of left ankle  M25.572 719.47    G89.29 338.29   2. Decreased range of motion of left ankle  M25.672 719.57   3. History of ankle surgery  Z98.890 V45.89   4. Peroneal tendon tear, left, subsequent encounter  S86.312D V58.89     844.8       Subjective Evaluation    History of Present Illness    Subjective comment: Pt has 5/10 pain today.       Objective   See Exercise, Manual, and Modality Logs for complete treatment.       Assessment & Plan       Assessment  Assessment details: Tx today consisted of mh and ankle stretching; followed by standing exercises and proprioceptive training for improved balance and ended with ice for decreased pain.  Pt noted to have improved balance with tandem walking today.    Plan  Plan details: Will follow progressing leg stability and improved balance and gait.        Timed:         Manual Therapy:         mins  75501;     Therapeutic Exercise:    16     mins  16161;     Neuromuscular Jazmin:    15    mins  91244;    Therapeutic Activity:     15     mins  32336;     Gait Training:           mins  02836;     Ultrasound:          mins  09510;    Ionto                                   mins   99159  Self Care                            mins   50358  Canalith Repos         mins 67073      Un-Timed:  Electrical Stimulation:         mins  77553 (MC );  Dry Needling          mins self-pay  Traction          mins 93134      Timed Treatment: 46   mins   Total Treatment:     57   mins(5 min mh and 6 minice)    Win Rodriguez PT  [place] License: [id number]      Electronically signed by Win Rodriguez PT, 23, 3:06 PM EST

## 2023-12-14 ENCOUNTER — OFFICE VISIT (OUTPATIENT)
Dept: FAMILY MEDICINE CLINIC | Facility: CLINIC | Age: 53
End: 2023-12-14
Payer: COMMERCIAL

## 2023-12-14 ENCOUNTER — HOSPITAL ENCOUNTER (OUTPATIENT)
Dept: GENERAL RADIOLOGY | Facility: HOSPITAL | Age: 53
Discharge: HOME | End: 2023-12-14
Admitting: NURSE PRACTITIONER
Payer: COMMERCIAL

## 2023-12-14 VITALS
BODY MASS INDEX: 36.41 KG/M2 | RESPIRATION RATE: 20 BRPM | DIASTOLIC BLOOD PRESSURE: 84 MMHG | OXYGEN SATURATION: 98 % | TEMPERATURE: 98.2 F | SYSTOLIC BLOOD PRESSURE: 116 MMHG | WEIGHT: 232 LBS | HEIGHT: 67 IN | HEART RATE: 102 BPM

## 2023-12-14 DIAGNOSIS — R10.30 LOWER ABDOMINAL PAIN: ICD-10-CM

## 2023-12-14 DIAGNOSIS — R30.0 DYSURIA: ICD-10-CM

## 2023-12-14 DIAGNOSIS — R19.7 DIARRHEA OF PRESUMED INFECTIOUS ORIGIN: Primary | ICD-10-CM

## 2023-12-14 DIAGNOSIS — R10.84 GENERALIZED ABDOMINAL PAIN: ICD-10-CM

## 2023-12-14 LAB

## 2023-12-14 PROCEDURE — 3074F SYST BP LT 130 MM HG: CPT | Performed by: NURSE PRACTITIONER

## 2023-12-14 PROCEDURE — 99214 OFFICE O/P EST MOD 30 MIN: CPT | Performed by: NURSE PRACTITIONER

## 2023-12-14 PROCEDURE — 87507 IADNA-DNA/RNA PROBE TQ 12-25: CPT | Performed by: NURSE PRACTITIONER

## 2023-12-14 PROCEDURE — 1160F RVW MEDS BY RX/DR IN RCRD: CPT | Performed by: NURSE PRACTITIONER

## 2023-12-14 PROCEDURE — 74018 RADEX ABDOMEN 1 VIEW: CPT

## 2023-12-14 PROCEDURE — 1159F MED LIST DOCD IN RCRD: CPT | Performed by: NURSE PRACTITIONER

## 2023-12-14 PROCEDURE — 3079F DIAST BP 80-89 MM HG: CPT | Performed by: NURSE PRACTITIONER

## 2023-12-14 RX ORDER — DICYCLOMINE HYDROCHLORIDE 10 MG/1
10 CAPSULE ORAL 2 TIMES DAILY
Qty: 20 CAPSULE | Refills: 0 | Status: SHIPPED | OUTPATIENT
Start: 2023-12-14

## 2023-12-14 RX ORDER — NITROFURANTOIN 25; 75 MG/1; MG/1
100 CAPSULE ORAL 2 TIMES DAILY
Qty: 20 CAPSULE | Refills: 0 | Status: SHIPPED | OUTPATIENT
Start: 2023-12-14

## 2023-12-14 NOTE — PROGRESS NOTES
"Subjective   Jaquan Mckay is a 53 y.o. male.     Chief Complaint   Patient presents with    Hypertension       History of Present Illness     HTN-chronic and ongoing. He is under the care of cardiology.  He is currently taking lisinopril 30 mg.  No negative side effects of medication.  He denies any recent chest pain or palpitations.  Diarrhea-reports \"since my birthday\".  He ate at a resturant and he is not sure that he got food that was fully cooked.  He reports abdomen cramps and diarrhea since that time.  He has noted bloating and abdomen distention.   He does not think he has any backed up stool.  He was seen at the ED and was noted to have norovirus.  He continues to have concerns.   Dysuria-noted to have \"UTI\" at the ED.  He continues to have burning.  He reports that urine is also dark in color.  He has persistent dysuria. He is not seeing blood in his urine.  He has had similar discomfort when he last had stool backed up.    GERD-ongoing.  On dexilant 60 mg.  He has an upcoming GI appt.  He has some intermittent abdomen pain but denies reflux.  No negative side effects of medication.      The following portions of the patient's history were reviewed and updated as appropriate: CC, ROS, allergies, current medications, past family history, past medical history, past social history, past surgical history and problem list.      Review of Systems   Constitutional:  Positive for fatigue. Negative for appetite change, unexpected weight gain and unexpected weight loss.   HENT:  Negative for congestion, ear pain, postnasal drip, rhinorrhea, sore throat, swollen glands, trouble swallowing and voice change.    Eyes:  Negative for pain and visual disturbance.   Respiratory:  Negative for cough, chest tightness, shortness of breath and wheezing.    Cardiovascular:  Negative for chest pain, palpitations and leg swelling.   Gastrointestinal:  Positive for abdominal distention, abdominal pain, diarrhea, nausea and " "indigestion. Negative for blood in stool and constipation.   Genitourinary:  Negative for dysuria, hematuria and urgency.   Musculoskeletal:  Negative for arthralgias, back pain, gait problem, joint swelling and myalgias.   Skin:  Negative for color change and skin lesions.   Allergic/Immunologic: Negative.    Neurological:  Negative for dizziness, numbness and headache.   Hematological: Negative.    Psychiatric/Behavioral:  Negative for dysphoric mood, sleep disturbance and suicidal ideas. The patient is not nervous/anxious.    All other systems reviewed and are negative.      Objective     /84   Pulse 102   Temp 98.2 °F (36.8 °C)   Resp 20   Ht 170.2 cm (67.01\")   Wt 105 kg (232 lb)   SpO2 98%   BMI 36.33 kg/m²     Physical Exam  Vitals reviewed.   Constitutional:       General: He is not in acute distress.     Appearance: He is well-developed. He is obese. He is not diaphoretic.   HENT:      Head: Normocephalic and atraumatic.      Jaw: No tenderness.      Right Ear: Hearing, tympanic membrane, ear canal and external ear normal.      Left Ear: Hearing, tympanic membrane, ear canal and external ear normal.      Nose: Nose normal. No nasal tenderness or congestion.      Right Sinus: No maxillary sinus tenderness or frontal sinus tenderness.      Left Sinus: No maxillary sinus tenderness or frontal sinus tenderness.      Mouth/Throat:      Lips: Pink.      Mouth: Mucous membranes are moist.      Pharynx: Oropharynx is clear. Uvula midline.   Eyes:      General: Lids are normal. No scleral icterus.     Extraocular Movements:      Right eye: Normal extraocular motion and no nystagmus.      Left eye: Normal extraocular motion and no nystagmus.      Conjunctiva/sclera: Conjunctivae normal.      Pupils: Pupils are equal, round, and reactive to light.   Neck:      Thyroid: No thyromegaly or thyroid tenderness.      Vascular: No carotid bruit or JVD.      Trachea: No tracheal tenderness.   Cardiovascular:      " Rate and Rhythm: Normal rate and regular rhythm.      Pulses:           Dorsalis pedis pulses are 2+ on the right side and 2+ on the left side.        Posterior tibial pulses are 2+ on the right side and 2+ on the left side.      Heart sounds: Normal heart sounds, S1 normal and S2 normal. No murmur heard.  Pulmonary:      Effort: Pulmonary effort is normal. No accessory muscle usage, prolonged expiration or respiratory distress.      Breath sounds: Normal breath sounds.   Chest:      Chest wall: No tenderness.   Abdominal:      General: Bowel sounds are normal. There is distension.      Palpations: Abdomen is soft. There is no hepatomegaly, splenomegaly or mass.      Tenderness: There is generalized abdominal tenderness and tenderness in the periumbilical area.   Musculoskeletal:         General: Tenderness present.      Cervical back: Normal range of motion and neck supple.      Right lower leg: No edema.      Left lower leg: No edema.      Comments: No muscular atrophy or flaccidity.  Ankle brace in place on left ankle/foot     Lymphadenopathy:      Head:      Right side of head: No submental or submandibular adenopathy.      Left side of head: No submental or submandibular adenopathy.      Cervical: No cervical adenopathy.      Right cervical: No superficial cervical adenopathy.     Left cervical: No superficial cervical adenopathy.   Skin:     General: Skin is warm and dry.      Capillary Refill: Capillary refill takes less than 2 seconds.      Coloration: Skin is not jaundiced or pale.      Findings: No erythema.      Nails: There is no clubbing.   Neurological:      Mental Status: He is alert and oriented to person, place, and time.      Cranial Nerves: No cranial nerve deficit or facial asymmetry.      Sensory: No sensory deficit.      Motor: No weakness, tremor, atrophy or abnormal muscle tone.      Coordination: Coordination normal.      Gait: Gait abnormal (moderate antalgia).      Deep Tendon Reflexes:  Reflexes are normal and symmetric.   Psychiatric:         Attention and Perception: He is attentive.         Mood and Affect: Mood is depressed. Mood is not anxious.         Speech: Speech normal.         Behavior: Behavior normal. Behavior is cooperative.         Thought Content: Thought content normal.         Cognition and Memory: Cognition normal.         Judgment: Judgment normal.         Diagnoses and all orders for this visit:    1. Diarrhea of presumed infectious origin (Primary)  Overview:  Added automatically from request for surgery [id number]    Orders:  -     Ambulatory Referral to Gastroenterology  -     Gastrointestinal Panel, PCR - Stool, Per Rectum    2. Dysuria  -     nitrofurantoin, macrocrystal-monohydrate, (Macrobid) 100 MG capsule; Take 1 capsule by mouth 2 (Two) Times a Day.  Dispense: 20 capsule; Refill: 0    3. Generalized abdominal pain  -     dicyclomine (Bentyl) 10 MG capsule; Take 1 capsule by mouth 2 (Two) Times a Day.  Dispense: 20 capsule; Refill: 0    4. Lower abdominal pain  -     XR Abdomen KUB; Future          Understands disease processes and need for medications.  Understands reasons for urgent and emergent care.  Patient (& family) verbalized agreement for treatment plan.   Emotional support and active listening provided.  Patient provided time to verbalize feelings.    Imaging to rule out possible obstruction or constipation.    Keep Gi appts  Continue under the care of ortho for foot/ankle follow up.     RTC 1-2 month, sooner if needed.             This document has been electronically signed by:  BALDOMERO Thao FNP-C Dragon disclaimer:  Part of this note may be an electronic transcription/translation of spoken language to printed text using the Dragon Dictation System.

## 2023-12-14 NOTE — PROGRESS NOTES
His xray does show mild constipation.  Stop the imodium and the zofran.  They can increase the constipation.  Use his bowel softeners.

## 2023-12-15 RX ORDER — SENNOSIDES A AND B 8.6 MG/1
2 TABLET, FILM COATED ORAL 2 TIMES DAILY
Qty: 120 TABLET | Refills: 0 | Status: SHIPPED | OUTPATIENT
Start: 2023-12-15

## 2023-12-19 ENCOUNTER — HOSPITAL ENCOUNTER (EMERGENCY)
Facility: HOSPITAL | Age: 53
Discharge: HOME | End: 2023-12-20
Attending: STUDENT IN AN ORGANIZED HEALTH CARE EDUCATION/TRAINING PROGRAM | Admitting: STUDENT IN AN ORGANIZED HEALTH CARE EDUCATION/TRAINING PROGRAM
Payer: COMMERCIAL

## 2023-12-19 ENCOUNTER — APPOINTMENT (OUTPATIENT)
Dept: GENERAL RADIOLOGY | Facility: HOSPITAL | Age: 53
End: 2023-12-19
Payer: COMMERCIAL

## 2023-12-19 DIAGNOSIS — R07.89 NON-CARDIAC CHEST PAIN: Primary | ICD-10-CM

## 2023-12-19 LAB
ALBUMIN SERPL-MCNC: 3.9 G/DL (ref 3.5–5.2)
ALBUMIN/GLOB SERPL: 1.3 G/DL
ALP SERPL-CCNC: 90 U/L (ref 39–117)
ALT SERPL W P-5'-P-CCNC: 22 U/L (ref 1–41)
AMYLASE SERPL-CCNC: 44 U/L (ref 28–100)
ANION GAP SERPL CALCULATED.3IONS-SCNC: 12.3 MMOL/L (ref 5–15)
APTT PPP: 24.7 SECONDS (ref 26.5–34.5)
AST SERPL-CCNC: 22 U/L (ref 1–40)
BASOPHILS # BLD AUTO: 0.02 10*3/MM3 (ref 0–0.2)
BASOPHILS NFR BLD AUTO: 0.2 % (ref 0–1.5)
BILIRUB SERPL-MCNC: 0.3 MG/DL (ref 0–1.2)
BUN SERPL-MCNC: 11 MG/DL (ref 6–20)
BUN/CREAT SERPL: 10.4 (ref 7–25)
CALCIUM SPEC-SCNC: 8.8 MG/DL (ref 8.6–10.5)
CHLORIDE SERPL-SCNC: 103 MMOL/L (ref 98–107)
CO2 SERPL-SCNC: 22.7 MMOL/L (ref 22–29)
CREAT SERPL-MCNC: 1.06 MG/DL (ref 0.76–1.27)
DEPRECATED RDW RBC AUTO: 42.4 FL (ref 37–54)
EGFRCR SERPLBLD CKD-EPI 2021: 85 ML/MIN/1.73
EOSINOPHIL # BLD AUTO: 0.05 10*3/MM3 (ref 0–0.4)
EOSINOPHIL NFR BLD AUTO: 0.5 % (ref 0.3–6.2)
ERYTHROCYTE [DISTWIDTH] IN BLOOD BY AUTOMATED COUNT: 12.6 % (ref 12.3–15.4)
GLOBULIN UR ELPH-MCNC: 3.1 GM/DL
GLUCOSE SERPL-MCNC: 126 MG/DL (ref 65–99)
HCT VFR BLD AUTO: 42.4 % (ref 37.5–51)
HGB BLD-MCNC: 15.2 G/DL (ref 13–17.7)
HOLD SPECIMEN: NORMAL
HOLD SPECIMEN: NORMAL
IMM GRANULOCYTES # BLD AUTO: 0.02 10*3/MM3 (ref 0–0.05)
IMM GRANULOCYTES NFR BLD AUTO: 0.2 % (ref 0–0.5)
INR PPP: 1.01 (ref 0.9–1.1)
LIPASE SERPL-CCNC: 31 U/L (ref 13–60)
LYMPHOCYTES # BLD AUTO: 2.62 10*3/MM3 (ref 0.7–3.1)
LYMPHOCYTES NFR BLD AUTO: 25.8 % (ref 19.6–45.3)
MAGNESIUM SERPL-MCNC: 1.5 MG/DL (ref 1.6–2.6)
MCH RBC QN AUTO: 32.8 PG (ref 26.6–33)
MCHC RBC AUTO-ENTMCNC: 35.8 G/DL (ref 31.5–35.7)
MCV RBC AUTO: 91.4 FL (ref 79–97)
MONOCYTES # BLD AUTO: 1.04 10*3/MM3 (ref 0.1–0.9)
MONOCYTES NFR BLD AUTO: 10.2 % (ref 5–12)
NEUTROPHILS NFR BLD AUTO: 6.42 10*3/MM3 (ref 1.7–7)
NEUTROPHILS NFR BLD AUTO: 63.1 % (ref 42.7–76)
NRBC BLD AUTO-RTO: 0 /100 WBC (ref 0–0.2)
NT-PROBNP SERPL-MCNC: <36 PG/ML (ref 0–900)
PLATELET # BLD AUTO: 205 10*3/MM3 (ref 140–450)
PMV BLD AUTO: 9.7 FL (ref 6–12)
POTASSIUM SERPL-SCNC: 3.7 MMOL/L (ref 3.5–5.2)
PROT SERPL-MCNC: 7 G/DL (ref 6–8.5)
PROTHROMBIN TIME: 13.8 SECONDS (ref 12.1–14.7)
RBC # BLD AUTO: 4.64 10*6/MM3 (ref 4.14–5.8)
SODIUM SERPL-SCNC: 138 MMOL/L (ref 136–145)
TROPONIN T SERPL HS-MCNC: 7 NG/L
WBC NRBC COR # BLD AUTO: 10.17 10*3/MM3 (ref 3.4–10.8)
WHOLE BLOOD HOLD COAG: NORMAL
WHOLE BLOOD HOLD SPECIMEN: NORMAL

## 2023-12-19 PROCEDURE — 84484 ASSAY OF TROPONIN QUANT: CPT

## 2023-12-19 PROCEDURE — 25010000002 MAGNESIUM SULFATE 2 GM/50ML SOLUTION: Performed by: PHYSICIAN ASSISTANT

## 2023-12-19 PROCEDURE — 83690 ASSAY OF LIPASE: CPT | Performed by: PHYSICIAN ASSISTANT

## 2023-12-19 PROCEDURE — 99284 EMERGENCY DEPT VISIT MOD MDM: CPT

## 2023-12-19 PROCEDURE — 85610 PROTHROMBIN TIME: CPT | Performed by: PHYSICIAN ASSISTANT

## 2023-12-19 PROCEDURE — 85730 THROMBOPLASTIN TIME PARTIAL: CPT | Performed by: PHYSICIAN ASSISTANT

## 2023-12-19 PROCEDURE — 82150 ASSAY OF AMYLASE: CPT | Performed by: PHYSICIAN ASSISTANT

## 2023-12-19 PROCEDURE — 96365 THER/PROPH/DIAG IV INF INIT: CPT

## 2023-12-19 PROCEDURE — 25010000002 ONDANSETRON PER 1 MG: Performed by: PHYSICIAN ASSISTANT

## 2023-12-19 PROCEDURE — 96375 TX/PRO/DX INJ NEW DRUG ADDON: CPT

## 2023-12-19 PROCEDURE — 83880 ASSAY OF NATRIURETIC PEPTIDE: CPT | Performed by: PHYSICIAN ASSISTANT

## 2023-12-19 PROCEDURE — 85025 COMPLETE CBC W/AUTO DIFF WBC: CPT

## 2023-12-19 PROCEDURE — 80053 COMPREHEN METABOLIC PANEL: CPT

## 2023-12-19 PROCEDURE — 36415 COLL VENOUS BLD VENIPUNCTURE: CPT

## 2023-12-19 PROCEDURE — 83735 ASSAY OF MAGNESIUM: CPT | Performed by: PHYSICIAN ASSISTANT

## 2023-12-19 PROCEDURE — 93005 ELECTROCARDIOGRAM TRACING: CPT

## 2023-12-19 PROCEDURE — 71045 X-RAY EXAM CHEST 1 VIEW: CPT

## 2023-12-19 PROCEDURE — 25010000002 MORPHINE PER 10 MG: Performed by: STUDENT IN AN ORGANIZED HEALTH CARE EDUCATION/TRAINING PROGRAM

## 2023-12-19 PROCEDURE — 71045 X-RAY EXAM CHEST 1 VIEW: CPT | Performed by: RADIOLOGY

## 2023-12-19 RX ORDER — ASPIRIN 325 MG
325 TABLET ORAL ONCE
Status: DISCONTINUED | OUTPATIENT
Start: 2023-12-19 | End: 2023-12-19

## 2023-12-19 RX ORDER — SODIUM CHLORIDE 0.9 % (FLUSH) 0.9 %
10 SYRINGE (ML) INJECTION AS NEEDED
Status: DISCONTINUED | OUTPATIENT
Start: 2023-12-19 | End: 2023-12-20 | Stop reason: HOSPADM

## 2023-12-19 RX ORDER — MAGNESIUM SULFATE HEPTAHYDRATE 40 MG/ML
2 INJECTION, SOLUTION INTRAVENOUS ONCE
Status: COMPLETED | OUTPATIENT
Start: 2023-12-19 | End: 2023-12-20

## 2023-12-19 RX ORDER — ONDANSETRON 2 MG/ML
4 INJECTION INTRAMUSCULAR; INTRAVENOUS ONCE
Status: COMPLETED | OUTPATIENT
Start: 2023-12-19 | End: 2023-12-19

## 2023-12-19 RX ADMIN — MORPHINE SULFATE 4 MG: 4 INJECTION, SOLUTION INTRAMUSCULAR; INTRAVENOUS at 21:16

## 2023-12-19 RX ADMIN — MAGNESIUM SULFATE HEPTAHYDRATE 2 G: 40 INJECTION, SOLUTION INTRAVENOUS at 23:08

## 2023-12-19 RX ADMIN — ONDANSETRON 4 MG: 2 INJECTION INTRAMUSCULAR; INTRAVENOUS at 21:14

## 2023-12-20 VITALS
TEMPERATURE: 97.4 F | RESPIRATION RATE: 18 BRPM | DIASTOLIC BLOOD PRESSURE: 87 MMHG | HEIGHT: 67 IN | BODY MASS INDEX: 34.84 KG/M2 | HEART RATE: 84 BPM | OXYGEN SATURATION: 99 % | WEIGHT: 222 LBS | SYSTOLIC BLOOD PRESSURE: 129 MMHG

## 2023-12-20 LAB
GEN 5 2HR TROPONIN T REFLEX: 8 NG/L
QT INTERVAL: 336 MS
QTC INTERVAL: 437 MS
TROPONIN T DELTA: 1 NG/L

## 2023-12-20 PROCEDURE — 96366 THER/PROPH/DIAG IV INF ADDON: CPT

## 2023-12-20 NOTE — ED PROVIDER NOTES
"Subjective   History of Present Illness  53-year-old male who presents to the ED today via EMS for chest pain.  He states he started to have chest pain about 3 hours ago.  He states it started while he was [place] shopping in [facility].  He states the pain came on gradually and progressively gotten worse.  He states the pain is in the center of his chest and radiates into his back and into the back of his head.  He states the pain is constant.  He states it hurts worse when he takes a deep breath.  He states he does feel short of breath and also feels dizzy.  He denies any cough.  He denies any nausea or vomiting.  He states he did get diaphoretic and he had to change his shirt once he got home.  He was given 324 mg of aspirin by EMS prior to arrival.  He states currently his pain is an 8 out of 10.  He states he had \"a light heart attack\" many years ago.  He does not currently follow with a cardiologist.  He states he had a chemical stress test about 3 to 4 years ago.  He states he had a prior heart cath but it has been many years ago.     History provided by:  Patient  Chest Pain  Pain location:  Substernal area  Pain quality: sharp    Pain radiates to:  Upper back  Pain severity:  Moderate  Onset quality:  Sudden  Duration:  3 hours  Timing:  Constant  Progression:  Worsening  Chronicity:  New  Context: breathing    Relieved by:  Nothing  Worsened by:  Deep breathing  Ineffective treatments:  None tried  Associated symptoms: back pain, diaphoresis, dizziness and shortness of breath    Associated symptoms: no abdominal pain, no altered mental status, no anorexia, no anxiety, no cough, no dysphagia, no fatigue, no fever, no headache, no heartburn, no lower extremity edema, no nausea, no near-syncope, no palpitations, no syncope, no vomiting and no weakness    Risk factors: high cholesterol, hypertension, male sex, obesity and smoking        Review of Systems   Constitutional:  Positive for diaphoresis. Negative for " "fatigue and fever.   HENT: Negative.  Negative for trouble swallowing.    Eyes: Negative.    Respiratory:  Positive for shortness of breath. Negative for cough.    Cardiovascular:  Positive for chest pain. Negative for palpitations, syncope and near-syncope.   Gastrointestinal: Negative.  Negative for abdominal pain, anorexia, heartburn, nausea and vomiting.   Genitourinary: Negative.    Musculoskeletal:  Positive for back pain.   Skin: Negative.    Neurological:  Positive for dizziness. Negative for weakness and headaches.   Psychiatric/Behavioral: Negative.     All other systems reviewed and are negative.      Past Medical History:   Diagnosis Date    Allergic     Anxiety     Arthritis     Asthma     Body piercing     REPORTS CYLICONE IN EARS    Clotting disorder 2004    had a knee surgery    Coronary artery disease     Depression     DVT (deep venous thrombosis)     RIGHT RIGHT KNEE AFTER SURGERY YEARS AGO IN 2001 OR 2004    Elevated cholesterol     Gastric ulcer     GERD (gastroesophageal reflux disease)     H/O migraine     Headache     Heart attack     REPORTS \"LIGHT HEART ATTACK A LONG TIME AGO\"  \"EARLY 90'S\"    History of seizures     REPORTS LAST EPISODE WAS AROUND 1995.    Hostility     Hyperlipidemia     Hypertension     Knee pain, acute     Left    Low back pain     Lyme disease     Migraine     MRSA (methicillin resistant Staphylococcus aureus)     REPORTS LAST TESTED + 2004. WAS TREATED HE REPORTS.  RIGHT ARM, RIGHT KNEE.    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     Obesity     Poor historian     [place] spotted fever     Seizures     Sleep apnea     Tattoo     Wears glasses        Allergies   Allergen Reactions    Ciprofloxacin Anaphylaxis and Hives    Miralax [Polyethylene Glycol] Itching and Rash    Mobic [Meloxicam] Other (See Comments)     Pt states, \"It make my feet and hands go numb and I can't hardly walk.\"     Paxil [Paroxetine Hcl] Shortness Of Breath     Chest pain     Peanut-Containing Drug Products " Anaphylaxis    Penicillins Anaphylaxis    Pristiq [Desvenlafaxine Succinate Er] Dizziness    Sulfa Antibiotics Anaphylaxis, Itching and Rash    Doxycycline Hives    Fish-Derived Products Hives    Isosorbide Nitrate Rash     Rash, hives, had to use inhaler.     Movantik [Naloxegol] Rash    Seroquel [Quetiapine] Hives and Rash    Buspar [Buspirone] Rash    Clarithromycin Rash    Clindamycin/Lincomycin Rash    Codeine Rash    Contrast Dye (Echo Or Unknown Ct/Mr) Itching and Rash    Diltiazem Rash    Flomax [Tamsulosin] Hives    Gabapentin Rash    Iodinated Contrast Media Itching and Rash    Keflex [Cephalexin] Rash    Levocetirizine Rash    Linzess [Linaclotide] Rash    Metoprolol Rash    Prednisone Rash and Other (See Comments)     Face, feet, and legs go completely numb per patient    Robitussin Cough+ Chest Max St [Dextromethorphan-Guaifenesin] Itching    Shrimp (Diagnostic) Rash    Spironolactone Rash    Viibryd [Vilazodone Hcl] Itching and Rash    Zoloft [Sertraline Hcl] Hives and Itching       Past Surgical History:   Procedure Laterality Date    ABDOMINAL SURGERY      BACK SURGERY      BRAIN SURGERY  1986    Tumor removal     CARDIAC CATHETERIZATION N/A 9/28/2018    Procedure: Left Heart Cath;  Surgeon: Leandro Daily MD;  Location: [facility] CATH INVASIVE LOCATION;  Service: Cardiology    CHOLECYSTECTOMY      COLONOSCOPY      COLONOSCOPY N/A 8/2/2021    Procedure: COLONOSCOPY FOR SCREENING;  Surgeon: Irving Azar MD;  Location: [facility];  Service: Gastroenterology;  Laterality: N/A;    CYST REMOVAL      pilonidal cyst    ELBOW EPICONDYLECTOMY Right 7/23/2020    Procedure: LATERAL EPICONDYLAR RELEASE;  Surgeon: Jose Ruiz MD;  Location: [facility] OR;  Service: Orthopedics;  Laterality: Right;    ENDOSCOPY      ENDOSCOPY N/A 8/2/2021    Procedure: ESOPHAGOGASTRODUODENOSCOPY WITH BIOPSY;  Surgeon: Irving Azar MD;  Location: [facility];  Service: Gastroenterology;   Laterality: N/A;  esophageal dilatation to 20mm    FRACTURE SURGERY Right     elbow    KNEE ARTHROSCOPY Left 10/20/2017    Procedure: Diagnostic arthroscopy left knee with chondroplasty;  Surgeon: Marco Aguirre MD;  Location: [facility] OR;  Service:     KNEE ARTHROSCOPY Left 1/11/2021    Procedure: KNEE DIAGNOSTIC ARTHROSCOPY WITH  CHONDROPLASTY patella, femoral and medial;  Surgeon: Raul Eagle MD;  Location: [facility] OR;  Service: Orthopedics;  Laterality: Left;    KNEE SURGERY Right     MOUTH SURGERY      FULL MOUTH EXTRACTION    OTHER SURGICAL HISTORY      REPORTS 7 TICKS REMOVED FROM RIGHT ARM IN 2001 OR 2002    TENNIS ELBOW RELEASE Right 7/23/2020    Procedure: RIGHT TENNIS ELBOW RELEASE;  Surgeon: Jose Ruiz MD;  Location: 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       Family History   Problem Relation Age of Onset    Diabetes Mother     Hypertension Mother     Stroke Mother     Diabetes Father     Skin cancer Father     Hypertension Father     Heart attack Father     Diabetes Brother     Hypertension Brother     Heart disease Maternal Aunt     Heart disease Maternal Uncle     Heart disease Paternal Aunt     Heart disease Paternal Uncle     Heart disease Maternal Grandmother     Heart disease Maternal Grandfather     Heart disease Paternal Grandmother     Heart disease Paternal Grandfather        Social History     Socioeconomic History    Marital status:      Spouse name: Becca    Number of children: 2    Years of education: 12   Tobacco Use    Smoking status: Every Day     Packs/day: 1.00     Years: 17.00     Additional pack years: 0.00     Total pack years: 17.00     Types: Cigars, Cigarettes     Start date: 4/11/2022     Passive exposure: Current    Smokeless tobacco: Never   Vaping Use    Vaping Use: Never used   Substance and Sexual Activity    Alcohol use: No    Drug use: No    Sexual activity:  Defer     Partners: Female     Birth control/protection: None           Objective   Physical Exam  Vitals and nursing note reviewed.   Constitutional:       General: He is not in acute distress.     Appearance: He is well-developed. He is not diaphoretic.   HENT:      Head: Normocephalic and atraumatic.   Eyes:      Extraocular Movements: Extraocular movements intact.      Pupils: Pupils are equal, round, and reactive to light.   Neck:      Vascular: No JVD.      Trachea: No tracheal deviation.   Cardiovascular:      Rate and Rhythm: Regular rhythm. Tachycardia present.      Heart sounds: Normal heart sounds.   Pulmonary:      Effort: Pulmonary effort is normal.      Breath sounds: Normal breath sounds.   Chest:      Chest wall: Tenderness (pain over sternal area on palpation) present. No deformity or crepitus.   Abdominal:      General: Bowel sounds are normal.      Palpations: Abdomen is soft.      Tenderness: There is no abdominal tenderness.   Musculoskeletal:         General: Normal range of motion.      Cervical back: Normal range of motion and neck supple.      Right lower leg: No edema.      Left lower leg: No edema.   Lymphadenopathy:      Cervical: No cervical adenopathy.   Skin:     General: Skin is warm and dry.      Capillary Refill: Capillary refill takes less than 2 seconds.   Neurological:      General: No focal deficit present.      Mental Status: He is alert and oriented to person, place, and time.   Psychiatric:         Mood and Affect: Mood normal.         Procedures           ED Course  ED Course as of 12/20/23 0031   Tue Dec 19, 2023   2102 EKG notes sinus tachycardia.  102 bpm.  No acute ST elevation.  QTc 437.  Electronically signed by Alex Cunningham DO, 12/19/23, 9:02 PM EST.   [SF]   8448 XR Chest 1 View  Findings:     No priors available     AP view the chest shows normal appearance of the cardiac silhouette.   The osseous structures are normal.  Lungs are free from infiltrate  and  effusion.     IMPRESSION:  Impression:     No radiographic evidence of acute pulmonary disease.   []   2157 Endorsed to Rick Mehta []   Wed Dec 20, 2023   0030 HEART Score for Major Cardiac Events - MDCalc  Calculated on Dec 20 2023 12:29 AM  2 points -> Low Score (0-3 points) Risk of MACE of 0.9-1.7%. [RB]      ED Course User Index  [AH] Karyn Tipton PA  [RB] Rick Mehta II, PA  [SF] Alex Cunningham DO                HEART Score: 3                              Medical Decision Making  53-year-old with acute onset of chest pain.    Problems Addressed:  Non-cardiac chest pain: acute illness or injury     Details: Workup is unremarkable for any Cardiac event.  Patient be discharged encouraged for outpatient follow-up.    Amount and/or Complexity of Data Reviewed  Labs: ordered.  Radiology: ordered. Decision-making details documented in ED Course.    Risk  Prescription drug management.        Final diagnoses:   Non-cardiac chest pain   Electronically signed by GREG Spence, 12/19/23, 9:39 PM EST.     ED Disposition  ED Disposition       ED Disposition   Discharge    Condition   Stable    Comment   --               Tiera Valenzuela, APRN  [address] [facility] [address]  [phone]    Schedule an appointment as soon as possible for a visit            Medication List      No changes were made to your prescriptions during this visit.            Rick Mehta II, PA  12/20/23 0031

## 2024-01-12 ENCOUNTER — TRANSCRIBE ORDERS (OUTPATIENT)
Dept: PHYSICAL THERAPY | Facility: CLINIC | Age: 54
End: 2024-01-12
Payer: COMMERCIAL

## 2024-01-12 DIAGNOSIS — Z98.890 S/P ARTHROSCOPY: ICD-10-CM

## 2024-01-12 DIAGNOSIS — G57.50 TARSAL TUNNEL SYNDROME, UNSPECIFIED LATERALITY: Primary | ICD-10-CM

## 2024-01-15 ENCOUNTER — TELEMEDICINE (OUTPATIENT)
Dept: FAMILY MEDICINE CLINIC | Facility: CLINIC | Age: 54
End: 2024-01-15
Payer: COMMERCIAL

## 2024-01-15 VITALS — BODY MASS INDEX: 34.84 KG/M2 | WEIGHT: 222 LBS | HEIGHT: 67 IN

## 2024-01-15 DIAGNOSIS — R56.9 SEIZURES: ICD-10-CM

## 2024-01-15 DIAGNOSIS — I10 ESSENTIAL HYPERTENSION: ICD-10-CM

## 2024-01-15 DIAGNOSIS — E55.9 VITAMIN D DEFICIENCY: ICD-10-CM

## 2024-01-15 DIAGNOSIS — R51.9 WORSENING HEADACHES: ICD-10-CM

## 2024-01-15 DIAGNOSIS — R21 RASH AND OTHER NONSPECIFIC SKIN ERUPTION: ICD-10-CM

## 2024-01-15 DIAGNOSIS — R56.9 SEIZURES: Primary | ICD-10-CM

## 2024-01-15 DIAGNOSIS — M50.30 BULGE OF CERVICAL DISC WITHOUT MYELOPATHY: ICD-10-CM

## 2024-01-15 PROCEDURE — 1160F RVW MEDS BY RX/DR IN RCRD: CPT | Performed by: NURSE PRACTITIONER

## 2024-01-15 PROCEDURE — 1159F MED LIST DOCD IN RCRD: CPT | Performed by: NURSE PRACTITIONER

## 2024-01-15 PROCEDURE — 99214 OFFICE O/P EST MOD 30 MIN: CPT | Performed by: NURSE PRACTITIONER

## 2024-01-15 RX ORDER — PHENYTOIN SODIUM 100 MG/1
200 CAPSULE, EXTENDED RELEASE ORAL 2 TIMES DAILY
Qty: 120 CAPSULE | Refills: 5 | Status: SHIPPED | OUTPATIENT
Start: 2024-01-15

## 2024-01-15 RX ORDER — DIPHENHYDRAMINE HCL 25 MG
25-50 TABLET ORAL EVERY 8 HOURS PRN
Qty: 120 TABLET | Refills: 2 | Status: SHIPPED | OUTPATIENT
Start: 2024-01-15

## 2024-01-15 RX ORDER — ERGOCALCIFEROL 1.25 MG/1
50000 CAPSULE ORAL
Qty: 4 CAPSULE | Refills: 5 | Status: SHIPPED | OUTPATIENT
Start: 2024-01-15

## 2024-01-15 RX ORDER — CHLORTHALIDONE 25 MG/1
25 TABLET ORAL DAILY
Qty: 30 TABLET | Refills: 0 | Status: SHIPPED | OUTPATIENT
Start: 2024-01-15

## 2024-01-15 NOTE — PROGRESS NOTES
You have chosen to receive care through a telehealth visit.  Do you consent to use a video/audio connection for your medical care today? Yes    Patient has chosen to have a telehealth/video visit due to current pandemic/current public health emergency.  Patient is not recommended to present to the office for face-to-face evaluation.   This visit is live, real time audio and visual communication between the provider and the patient.      I did not complete this video visit with the patient using [employer].  This video visit was done with [employer] application.    Patient visit is for the following CC:     Chief Complaint   Patient presents with    Hypertension       Brief HPI/ROS obtained as follows:    Hypertension-chronic and ongoing.  Patient is currently taking lisinopril 30 mg.  He is under the care of cardiology.  He periodically monitors blood pressure at home.  He reports today it was elevated before his medications and 179/99 and HR was 99.  He has noted some elevations on  his home checks.  He reports He has not noted any negative side effects of his medications.  He denies any recent chest pain.  Hyperlipidemia-chronic and ongoing.  Patient is currently taking rosuvastatin 40 mg.  No negative side effects.  He had previously been on a different statin but his cholesterol had been uncontrolled.  Patient denies any negative side effects of cholesterol medication.  No reported myalgia or myopathies.  Chronic allergic rhinitis-ongoing.  Patient is currently under the care of allergy specialist.  He is on Claritin 10 mg daily and Singulair 10 mg daily.  He is also on Nasonex nasal spray.  He does receive injections for immunotherapy.  When he is consistent with his immunotherapy he has less nasal sores and irritation.  He also reports that his breathing is better.  He is on Flovent for mild asthma.  He also has as needed albuterol.  Chronic foot problem-under the care of orthopedic.  He has been wearing his brace.  " He reports a strain/sprain of his ankle with some popping.  He has been back to ortho.  He will be getting a better brace for instability.  He reports discussion of possible ankle fusion.  He will continue PT for 8 additional weeks.  He will go back on 01/23/2024.  He will get a re-evaluation after that by ortho.   Insomnia-continues to be intermittently ongoing.  He is currently ordered mirtazapine 30 mg 1.5 tablets daily.  Sleep pattern varies.  He has failed several other medications  Headaches-he reports at his temporal area. On the right.  This is his surgical side.  He reports that he had a \"bad headache\".  He used 2 \"pain pills\" to get relief.  He reports that it starts with a sensation of pressure.  Then \"like someone is pumping air\" into his head.  The pain seems to encompass his whole head.  He reports some light sensitivity and he uses a hot towel for \"10-15 minutes\".  He denies vision changes.  Some mild dizziness  He reports that he notes tinnitus.  No sensation of light flashing.  He reports no specific pattern.   He does not feel he has noted any increase in seizures. He has had some falls this past month but denies any head injury.  His last head CT was January 2023 after a fall with head trauma. He reports that he has noted that he has some increased pain at the mid skull line in the back.  He reports that he has noted some increased neck pain and some popping in his neck.  \"Bulging disc in neck\" per neuro surgery in the past.  He reports no recent imaging.  He has difficulty with closed MRI.      The following portions of the patient's history were reviewed and updated as appropriate: CC, ROS, allergies, current medications, past family history, past medical history, past social history, past surgical history and problem list.    Review of Systems   Constitutional:  Positive for fatigue. Negative for appetite change and unexpected weight change.   HENT:  Negative for congestion, ear pain, " nosebleeds, postnasal drip, rhinorrhea, sore throat, trouble swallowing and voice change.    Eyes:  Negative for photophobia, pain and visual disturbance.   Respiratory:  Negative for cough, chest tightness, shortness of breath and wheezing.    Cardiovascular:  Negative for chest pain and palpitations.   Gastrointestinal:  Negative for abdominal pain, blood in stool, constipation, diarrhea and nausea.   Endocrine: Negative for cold intolerance and polydipsia.   Genitourinary:  Negative for difficulty urinating, flank pain, frequency and hematuria.   Musculoskeletal:  Positive for arthralgias, back pain and myalgias. Negative for gait problem and joint swelling.   Skin:  Negative for color change and rash.   Allergic/Immunologic: Negative.    Neurological:  Negative for dizziness, syncope, numbness and headaches.   Hematological: Negative.    Psychiatric/Behavioral:  Negative for dysphoric mood, sleep disturbance and suicidal ideas. The patient is not nervous/anxious.    All other systems reviewed and are negative.      Assessment     Physical Exam   Constitutional: He appears well-developed and well-nourished. No distress.   HENT:   Head: Normocephalic.   Right Ear: External ear normal.   Left Ear: External ear normal.   Nose: Nose normal.   Mouth/Throat: Oropharynx is clear and moist.   Eyes: Pupils are equal, round, and reactive to light. Conjunctivae and EOM are normal. No scleral icterus.   Neck: Neck normal appearance.No JVD present. No thyromegaly present.   Pulmonary/Chest: Effort normal.  No respiratory distress. He no audible wheeze...  Abdominal: Abdomen appears normal. Soft. He exhibits no distension and no visible mass.   Musculoskeletal:         General: Tenderness present.      Comments: Multiple areas of generalized arthralgia and myalgia noted throughout spine, hips, knees, and shoulders.   Neurological: He is alert. No cranial nerve deficit. Coordination normal.   Skin: Skin is warm and dry.  Capillary refill takes less than 2 seconds. He is not diaphoretic. No nail bed cyanosis or erythema. No pallor. Nails show no clubbing.   Psychiatric: He has a normal mood and affect.       Diagnoses and all orders for this visit:    1. Seizures (Primary)  -     Cancel: MRI Brain Without Contrast; Future  -     MRI Brain Without Contrast; Future  -     Dilantin 100 MG capsule; Take 2 capsules by mouth 2 (Two) Times a Day.  Dispense: 120 capsule; Refill: 5    2. Worsening headaches  -     Cancel: MRI Brain Without Contrast; Future  -     Cancel: MRI Cervical Spine Without Contrast; Future  -     MRI Brain Without Contrast; Future  -     MRI Cervical Spine Without Contrast; Future    3. Bulge of cervical disc without myelopathy  -     Cancel: MRI Cervical Spine Without Contrast; Future  -     MRI Cervical Spine Without Contrast; Future    4. Essential hypertension  Comments:  Continue lisinopril.  Encouraged to monitor blood pressure at home  Orders:  -     chlorthalidone (HYGROTON) 25 MG tablet; Take 1 tablet by mouth Daily.  Dispense: 30 tablet; Refill: 0    5. Rash and other nonspecific skin eruption  Comments:  will provide benadryl for prophylaxis.  patient has had multiple allergic reactions.   Orders:  -     diphenhydrAMINE (Benadryl Allergy) 25 MG tablet; Take 1-2 tablets by mouth Every 8 (Eight) Hours As Needed for Itching (or rash).  Dispense: 120 tablet; Refill: 2    6. Seizures  Comments:  No known recent activity.  Continue Dilantin.  We will plan for an updated phenytoin level  Orders:  -     Cancel: MRI Brain Without Contrast; Future  -     MRI Brain Without Contrast; Future  -     Dilantin 100 MG capsule; Take 2 capsules by mouth 2 (Two) Times a Day.  Dispense: 120 capsule; Refill: 5    7. Vitamin D deficiency  -     vitamin D (ERGOCALCIFEROL) 1.25 MG (48117 UT) capsule capsule; Take 1 capsule by mouth Every 7 (Seven) Days.  Dispense: 4 capsule; Refill: 5      Patient instructed and advised to call if  symptoms are increasing or new symptoms occur.    Understands reasons for urgent and emergent care.  Patient (& family) verbalized agreement for treatment plan.     Generalized precautions advised including social distancing, hand washing, cough/sneeze hygiene.  Quarantine per CDC guidelines if exposed to illness.      Continue under the care of orthopedic.   Will plan for updated labs.     RTC 2-3 weeks for re-eval, sooner for worsening of symptoms.       This is an audio and video enabled enabled telehealth encounter.      This visit was conducted with the provider in the office at [facility] and the patient in their home in a private area.    This visit/video call lasted:  40 minutes       This document has been electronically signed by:  BALDOMERO Thao, YESY-C    Dragon disclaimer:  Part of this note may be an electronic transcription/translation of spoken language to printed text using the Dragon Dictation System.

## 2024-01-22 DIAGNOSIS — R56.9 SEIZURES: ICD-10-CM

## 2024-01-22 DIAGNOSIS — F32.A ANXIETY AND DEPRESSION: ICD-10-CM

## 2024-01-22 DIAGNOSIS — E66.01 CLASS 2 SEVERE OBESITY DUE TO EXCESS CALORIES WITH SERIOUS COMORBIDITY AND BODY MASS INDEX (BMI) OF 37.0 TO 37.9 IN ADULT: ICD-10-CM

## 2024-01-22 DIAGNOSIS — R94.6 ABNORMAL THYROID FUNCTION TEST: ICD-10-CM

## 2024-01-22 DIAGNOSIS — I10 ESSENTIAL HYPERTENSION: ICD-10-CM

## 2024-01-22 DIAGNOSIS — E55.9 VITAMIN D DEFICIENCY: Primary | ICD-10-CM

## 2024-01-22 DIAGNOSIS — E78.2 MIXED HYPERLIPIDEMIA: ICD-10-CM

## 2024-01-22 DIAGNOSIS — R53.83 OTHER FATIGUE: ICD-10-CM

## 2024-01-22 DIAGNOSIS — F41.9 ANXIETY AND DEPRESSION: ICD-10-CM

## 2024-01-23 ENCOUNTER — TREATMENT (OUTPATIENT)
Dept: PHYSICAL THERAPY | Facility: CLINIC | Age: 54
End: 2024-01-23
Payer: COMMERCIAL

## 2024-01-23 DIAGNOSIS — S86.312D PERONEAL TENDON TEAR, LEFT, SUBSEQUENT ENCOUNTER: Primary | ICD-10-CM

## 2024-01-23 DIAGNOSIS — R26.89 ANTALGIC GAIT: ICD-10-CM

## 2024-01-23 DIAGNOSIS — G89.29 CHRONIC PAIN OF LEFT ANKLE: ICD-10-CM

## 2024-01-23 DIAGNOSIS — M25.572 CHRONIC PAIN OF LEFT ANKLE: ICD-10-CM

## 2024-01-23 DIAGNOSIS — M25.672 DECREASED RANGE OF MOTION OF LEFT ANKLE: ICD-10-CM
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
## 2024-01-29 ENCOUNTER — OFFICE VISIT (OUTPATIENT)
Dept: FAMILY MEDICINE CLINIC | Facility: CLINIC | Age: 54
End: 2024-01-29
Payer: COMMERCIAL

## 2024-01-29 VITALS
WEIGHT: 231 LBS | RESPIRATION RATE: 18 BRPM | OXYGEN SATURATION: 100 % | TEMPERATURE: 98.2 F | DIASTOLIC BLOOD PRESSURE: 74 MMHG | HEIGHT: 67 IN | SYSTOLIC BLOOD PRESSURE: 126 MMHG | BODY MASS INDEX: 36.26 KG/M2 | HEART RATE: 90 BPM

## 2024-01-29 DIAGNOSIS — J45.30 MILD PERSISTENT ASTHMA WITHOUT COMPLICATION: ICD-10-CM

## 2024-01-29 DIAGNOSIS — M79.672 LEFT FOOT PAIN: ICD-10-CM

## 2024-01-29 DIAGNOSIS — E78.2 MIXED HYPERLIPIDEMIA: ICD-10-CM

## 2024-01-29 DIAGNOSIS — R30.0 DYSURIA: ICD-10-CM

## 2024-01-29 DIAGNOSIS — I10 ESSENTIAL HYPERTENSION: ICD-10-CM

## 2024-01-29 DIAGNOSIS — K21.9 GASTROESOPHAGEAL REFLUX DISEASE WITHOUT ESOPHAGITIS: ICD-10-CM

## 2024-01-29 DIAGNOSIS — F51.04 PSYCHOPHYSIOLOGICAL INSOMNIA: ICD-10-CM

## 2024-01-29 DIAGNOSIS — Z98.890 HISTORY OF ANKLE SURGERY: ICD-10-CM

## 2024-01-29 DIAGNOSIS — F41.8 ANXIETY ABOUT HEALTH: Primary | ICD-10-CM

## 2024-01-29 DIAGNOSIS — J34.89 NASAL SORE: ICD-10-CM

## 2024-01-29 LAB
BILIRUB BLD-MCNC: NEGATIVE MG/DL
CLARITY, POC: CLEAR
COLOR UR: YELLOW
EXPIRATION DATE: NORMAL
GLUCOSE UR STRIP-MCNC: NEGATIVE MG/DL
KETONES UR QL: NEGATIVE
LEUKOCYTE EST, POC: NEGATIVE
Lab: NORMAL
NITRITE UR-MCNC: NEGATIVE MG/ML
PH UR: 6 [PH] (ref 5–8)
PROT UR STRIP-MCNC: NEGATIVE MG/DL
RBC # UR STRIP: NEGATIVE /UL
SP GR UR: 1.01 (ref 1–1.03)
UROBILINOGEN UR QL: NORMAL

## 2024-01-29 PROCEDURE — 1160F RVW MEDS BY RX/DR IN RCRD: CPT | Performed by: NURSE PRACTITIONER

## 2024-01-29 PROCEDURE — 3078F DIAST BP <80 MM HG: CPT | Performed by: NURSE PRACTITIONER

## 2024-01-29 PROCEDURE — 3074F SYST BP LT 130 MM HG: CPT | Performed by: NURSE PRACTITIONER

## 2024-01-29 PROCEDURE — 99214 OFFICE O/P EST MOD 30 MIN: CPT | Performed by: NURSE PRACTITIONER

## 2024-01-29 PROCEDURE — 1159F MED LIST DOCD IN RCRD: CPT | Performed by: NURSE PRACTITIONER

## 2024-01-29 RX ORDER — DEXLANSOPRAZOLE 60 MG/1
60 CAPSULE, DELAYED RELEASE ORAL 2 TIMES DAILY
Qty: 60 CAPSULE | Refills: 5 | Status: SHIPPED | OUTPATIENT
Start: 2024-01-29

## 2024-01-29 RX ORDER — MIRTAZAPINE 30 MG/1
45 TABLET, FILM COATED ORAL NIGHTLY
Qty: 45 TABLET | Refills: 5 | Status: SHIPPED | OUTPATIENT
Start: 2024-01-29

## 2024-01-29 RX ORDER — LISINOPRIL 30 MG/1
30 TABLET ORAL DAILY
Qty: 30 TABLET | Refills: 5 | Status: SHIPPED | OUTPATIENT
Start: 2024-01-29

## 2024-01-29 RX ORDER — FLUTICASONE PROPIONATE 110 UG/1
1 AEROSOL, METERED RESPIRATORY (INHALATION) 2 TIMES DAILY
Qty: 12 G | Refills: 2 | Status: SHIPPED | OUTPATIENT
Start: 2024-01-29

## 2024-01-29 RX ORDER — ACETAMINOPHEN 500 MG
500 TABLET ORAL EVERY 6 HOURS PRN
Qty: 30 TABLET | Refills: 2 | Status: SHIPPED | OUTPATIENT
Start: 2024-01-29

## 2024-01-29 RX ORDER — ROSUVASTATIN CALCIUM 40 MG/1
40 TABLET, COATED ORAL DAILY
Qty: 60 TABLET | Refills: 3 | Status: SHIPPED | OUTPATIENT
Start: 2024-01-29

## 2024-01-29 RX ORDER — HYDROCODONE BITARTRATE AND ACETAMINOPHEN 7.5; 325 MG/1; MG/1
1 TABLET ORAL EVERY 6 HOURS PRN
Qty: 12 TABLET | Refills: 0 | Status: SHIPPED | OUTPATIENT
Start: 2024-01-29

## 2024-01-29 RX ORDER — DIAZEPAM 10 MG/1
10 TABLET ORAL ONCE
Qty: 1 TABLET | Refills: 0 | Status: SHIPPED | OUTPATIENT
Start: 2024-01-29 | End: 2024-01-29

## 2024-01-29 NOTE — PROGRESS NOTES
"Subjective   Jaquan Mckay is a 53 y.o. male.     Chief Complaint   Patient presents with    Hypertension       Hypertension  Pertinent negatives include no blurred vision, chest pain, palpitations or shortness of breath.      HTN-has noted some increased BP's.  He stopped Chlorthalidone due to dizziness and low BP of 100's.  He had an elevation at his podiatry appt but was having acute foot pain on that day. BP has otherwise been stable.  He continues Lisinopril 30 mg.  No associated symptoms such as CP, SOA, HA, or dizziness.  Foot pain-reports that his foot is not healing correctly.  He has had several falls and his ankle is get injured each time.  He has noted some bruising on his ankle since his last fall.  He reports on his lateral foot. He reports there is discussion for an ankle fusion.  He has noted some irritation at his ankle.   He will see ortho again after his therapy is over. He will have 9 weeks of PT that he starts this week.   He is wearing a foot/ankle brace.  He questions if it is putting pressure over his ankle region.  He is also wearing high-top sneakers.    Upcoming test-will be having MRI of Brain and C Spine.  He reports he continues to have persistent HA but not more than previous.  His procedure is 02/5/2024.  He is worried that he will not be able to be still while he is in the MRI machine.  He has some difficulty with anxiety.    Chronic dysuria-ongoing.  Would like to have his urine checked.  He has burning with urination frequently.  He reports that this morning \"it looked like blood\" but then was clear.    Nasal sore-reports that he has noted the sore in his right nostril has returned.  He reports that it has been present for several days. He is out of Bactroban ointment.  He does heat with electric heat.     The following portions of the patient's history were reviewed and updated as appropriate: CC, ROS, allergies, current medications, past family history, past medical history, past " "social history, past surgical history and problem list.      Review of Systems   Constitutional:  Positive for fatigue. Negative for appetite change, unexpected weight gain and unexpected weight loss.   HENT:  Negative for congestion, ear pain, postnasal drip, rhinorrhea, sore throat, swollen glands, trouble swallowing and voice change.    Eyes:  Negative for blurred vision, double vision, pain and visual disturbance.   Respiratory:  Negative for cough, chest tightness, shortness of breath and wheezing.    Cardiovascular:  Negative for chest pain, palpitations and leg swelling.   Gastrointestinal:  Negative for abdominal pain, blood in stool, constipation, diarrhea, nausea, vomiting and indigestion.   Endocrine: Negative.    Genitourinary:  Negative for dysuria, hematuria and urgency.   Musculoskeletal:  Positive for arthralgias, back pain, gait problem and myalgias. Negative for joint swelling.   Skin:  Negative for color change and skin lesions.   Allergic/Immunologic: Negative.    Neurological:  Positive for dizziness, seizures and headache. Negative for numbness.   Hematological: Negative.    Psychiatric/Behavioral:  Positive for stress. Negative for dysphoric mood, sleep disturbance and suicidal ideas. The patient is not nervous/anxious.    All other systems reviewed and are negative.      Objective     /74   Pulse 90   Temp 98.2 °F (36.8 °C)   Resp 18   Ht 170.2 cm (67.01\")   Wt 105 kg (231 lb)   SpO2 100%   BMI 36.17 kg/m²     Physical Exam  Vitals reviewed.   Constitutional:       General: He is not in acute distress.     Appearance: He is well-developed. He is obese. He is not diaphoretic.   HENT:      Head: Normocephalic and atraumatic.      Jaw: No tenderness.      Right Ear: Hearing, tympanic membrane, ear canal and external ear normal.      Left Ear: Hearing, tympanic membrane, ear canal and external ear normal.      Nose: Nose normal. No nasal tenderness or congestion.      Right Sinus: No " maxillary sinus tenderness or frontal sinus tenderness.      Left Sinus: No maxillary sinus tenderness or frontal sinus tenderness.      Mouth/Throat:      Lips: Pink.      Mouth: Mucous membranes are moist.      Pharynx: Oropharynx is clear. Uvula midline.   Eyes:      General: Lids are normal. No scleral icterus.     Extraocular Movements:      Right eye: Normal extraocular motion and no nystagmus.      Left eye: Normal extraocular motion and no nystagmus.      Conjunctiva/sclera: Conjunctivae normal.      Pupils: Pupils are equal, round, and reactive to light.   Neck:      Thyroid: No thyromegaly or thyroid tenderness.      Vascular: No carotid bruit or JVD.      Trachea: No tracheal tenderness.   Cardiovascular:      Rate and Rhythm: Normal rate and regular rhythm.      Pulses:           Dorsalis pedis pulses are 2+ on the right side and 2+ on the left side.        Posterior tibial pulses are 2+ on the right side and 2+ on the left side.      Heart sounds: Normal heart sounds, S1 normal and S2 normal. No murmur heard.  Pulmonary:      Effort: Pulmonary effort is normal. No accessory muscle usage, prolonged expiration or respiratory distress.      Breath sounds: Normal breath sounds.   Chest:      Chest wall: No tenderness.   Abdominal:      General: Bowel sounds are normal. There is no distension.      Palpations: Abdomen is soft. There is no hepatomegaly, splenomegaly or mass.      Tenderness: There is no abdominal tenderness. There is no right CVA tenderness or left CVA tenderness.      Hernia: No hernia is present.   Musculoskeletal:         General: Tenderness present.      Cervical back: Normal range of motion and neck supple.      Right lower leg: No edema.      Left lower leg: No edema.        Feet:       Comments: No muscular atrophy or flaccidity.  Ankle brace in place on left ankle/foot     Lymphadenopathy:      Head:      Right side of head: No submental or submandibular adenopathy.      Left side of  head: No submental or submandibular adenopathy.      Cervical: No cervical adenopathy.      Right cervical: No superficial cervical adenopathy.     Left cervical: No superficial cervical adenopathy.   Skin:     General: Skin is warm and dry.      Capillary Refill: Capillary refill takes less than 2 seconds.      Coloration: Skin is not jaundiced or pale.      Findings: No erythema.      Nails: There is no clubbing.   Neurological:      Mental Status: He is alert and oriented to person, place, and time.      Cranial Nerves: No cranial nerve deficit or facial asymmetry.      Sensory: No sensory deficit.      Motor: No weakness, tremor, atrophy or abnormal muscle tone.      Coordination: Coordination normal.      Gait: Gait abnormal (moderate antalgia).      Deep Tendon Reflexes: Reflexes are normal and symmetric.   Psychiatric:         Attention and Perception: He is attentive.         Mood and Affect: Mood is not anxious or depressed.         Speech: Speech normal.         Behavior: Behavior normal. Behavior is cooperative.         Thought Content: Thought content normal.         Cognition and Memory: Cognition normal.         Judgment: Judgment normal.         Diagnoses and all orders for this visit:    1. Anxiety about health (Primary)  Comments:  one time dose of valium before procedure  Orders:  -     diazePAM (Valium) 10 MG tablet; Take 1 tablet by mouth 1 (One) Time for 1 dose.  Dispense: 1 tablet; Refill: 0    2. Dysuria  Comments:  continue to push fluids.  Urinate frequently  Orders:  -     POC Urinalysis Dipstick, Automated    3. Gastroesophageal reflux disease without esophagitis  Comments:  Continue Dexilant 60 mg and famotidine 40 mg twice daily.  Avoid known food triggers  Orders:  -     dexlansoprazole (Dexilant) 60 MG capsule; Take 1 capsule by mouth 2 (Two) Times a Day.  Dispense: 60 capsule; Refill: 5    4. Essential hypertension  Comments:  Continue lisinopril.  Encouraged to monitor blood pressure  at home  Orders:  -     lisinopril (PRINIVIL,ZESTRIL) 30 MG tablet; Take 1 tablet by mouth Daily. FOR BLOOD PRESSURE  Dispense: 30 tablet; Refill: 5    5. Mild persistent asthma without complication  Comments:  continue under care of asthma specialist.  Continue Inhalers for breathing maintenance.   Orders:  -     fluticasone (Flovent HFA) 110 MCG/ACT inhaler; Inhale 1 puff 2 (Two) Times a Day.  Dispense: 12 g; Refill: 2    6. Mixed hyperlipidemia  -     rosuvastatin (CRESTOR) 40 MG tablet; Take 1 tablet by mouth Daily.  Dispense: 60 tablet; Refill: 3    7. Psychophysiological insomnia  Comments:  Continue mirtazapine 45 mg.  continue Restoril to 30 mg.  Patient to report any negative side effects.  Continue under the [facility] for mental health support  Overview:  With depression and anxiety      Orders:  -     mirtazapine (REMERON) 30 MG tablet; Take 1.5 tablets by mouth Every Night.  Dispense: 45 tablet; Refill: 5    8. Nasal sore  Comments:  Bactroban ointment to area.  Report any nonhealing area or signs or symptoms of infection  Orders:  -     mupirocin (BACTROBAN) 2 % ointment; Apply  topically to the appropriate area as directed 3 (Three) Times a Day.  Dispense: 30 g; Refill: 2    9. History of ankle surgery  Comments:  continue under the care of ortho  Advised to pad area of tenderness on ankle scar  Orders:  -     HYDROcodone-acetaminophen (Norco) 7.5-325 MG per tablet; Take 1 tablet by mouth Every 6 (Six) Hours As Needed for Moderate Pain.  Dispense: 12 tablet; Refill: 0    10. Left foot pain  Comments:  Continue under the care of orthopedic podiatry  Orders:  -     HYDROcodone-acetaminophen (Norco) 7.5-325 MG per tablet; Take 1 tablet by mouth Every 6 (Six) Hours As Needed for Moderate Pain.  Dispense: 12 tablet; Refill: 0    Other orders  -     acetaminophen (TYLENOL) 500 MG tablet; Take 1 tablet by mouth Every 6 (Six) Hours As Needed for Mild Pain.  Dispense: 30 tablet; Refill: 2       Understands  disease processes and need for medications.  Understands reasons for urgent and emergent care.  Patient (& family) verbalized agreement for treatment plan.   Emotional support and active listening provided.  Patient provided time to verbalize feelings.    Refill on routine maintenance meds today.     Reminded patient to obtain fasting labs.     RTC 1 month, sooner if needed.     I spent 47 minutes caring for Jaquan on this date of service. This time includes time spent by me in the following activities:preparing for the visit, obtaining and/or reviewing a separately obtained history, performing a medically appropriate examination and/or evaluation , counseling and educating the patient/family/caregiver, ordering medications, tests, or procedures, and documenting information in the medical record          This document has been electronically signed by:  BALDOMERO Thao, YESY-C    Dragon disclaimer:  Part of this note may be an electronic transcription/translation of spoken language to printed text using the Dragon Dictation System.

## 2024-01-31 ENCOUNTER — TREATMENT (OUTPATIENT)
Dept: PHYSICAL THERAPY | Facility: CLINIC | Age: 54
End: 2024-01-31
Payer: COMMERCIAL

## 2024-01-31 DIAGNOSIS — S86.312D PERONEAL TENDON TEAR, LEFT, SUBSEQUENT ENCOUNTER: ICD-10-CM

## 2024-01-31 DIAGNOSIS — G89.29 CHRONIC PAIN OF LEFT ANKLE: Primary | ICD-10-CM

## 2024-01-31 DIAGNOSIS — M25.572 CHRONIC PAIN OF LEFT ANKLE: Primary | ICD-10-CM

## 2024-01-31 DIAGNOSIS — M25.672 DECREASED RANGE OF MOTION OF LEFT ANKLE: ICD-10-CM

## 2024-01-31 DIAGNOSIS — R26.89 ANTALGIC GAIT: ICD-10-CM

## 2024-01-31 NOTE — PROGRESS NOTES
Physical Therapy Daily Treatment Note      Patient: Jaquan Mckay   : 1970  Referring practitioner: Raul Eagle MD  Date of Initial Visit: Type: THERAPY  Noted: 2024  Today's Date: 2024  Patient seen for 2 sessions       Visit Diagnoses:    ICD-10-CM ICD-9-CM   1. Chronic pain of left ankle  M25.572 719.47    G89.29 338.29   2. Peroneal tendon tear, left, subsequent encounter  S86.312D V58.89     844.8   3. Decreased range of motion of left ankle  M25.672 719.57   4. Antalgic gait  R26.89 781.2       Subjective Evaluation    History of Present Illness    Subjective comment: Pt has 8/10 pain today.       Objective   See Exercise, Manual, and Modality Logs for complete treatment.       Assessment & Plan       Assessment  Assessment details: Tx today consisted of mh to ankle; followed by manual stretching of ankle for mobility, st exercises for leg stability, proprioceptive training and ended with ice for decreased inflammation.  Pt demonstrated good effort with 5/10 post pain.    Plan  Plan details: Will follow progressing ankle mobility and stability for improved ADLs.          Timed:         Manual Therapy:         mins  72316;     Therapeutic Exercise:    28     mins  81324;     Neuromuscular Jazmin:    14    mins  22218;    Therapeutic Activity:          mins  49692;     Gait Training:           mins  29987;     Ultrasound:          mins  22816;    Ionto                                   mins   18786  Self Care                            mins   88554  Canalith Repos         mins 96869      Un-Timed:  Electrical Stimulation:         mins  46520 (MC );  Dry Needling          mins self-pay  Traction          mins 02694      Timed Treatment:   42   mins   Total Treatment:     54   mins(6min mh and 6 min ice)    Win Rodriguez PT  [place] License: [id number]      Electronically signed by Win Rodriguez PT, 24, 1:07 PM EST

## 2024-02-08 ENCOUNTER — TREATMENT (OUTPATIENT)
Dept: PHYSICAL THERAPY | Facility: CLINIC | Age: 54
End: 2024-02-08
Payer: COMMERCIAL

## 2024-02-08 DIAGNOSIS — S86.312D PERONEAL TENDON TEAR, LEFT, SUBSEQUENT ENCOUNTER: ICD-10-CM

## 2024-02-08 DIAGNOSIS — G89.29 CHRONIC PAIN OF LEFT ANKLE: Primary | ICD-10-CM

## 2024-02-08 DIAGNOSIS — R26.89 ANTALGIC GAIT: ICD-10-CM

## 2024-02-08 DIAGNOSIS — M25.672 DECREASED RANGE OF MOTION OF LEFT ANKLE: ICD-10-CM

## 2024-02-08 DIAGNOSIS — M25.572 CHRONIC PAIN OF LEFT ANKLE: Primary | ICD-10-CM

## 2024-02-08 NOTE — PROGRESS NOTES
Physical Therapy Daily Treatment Note      Patient: Jaquan Mckay   : 1970  Referring practitioner: Raul Eagle MD  Date of Initial Visit: Type: THERAPY  Noted: 2024  Today's Date: 2024  Patient seen for 3 sessions       Visit Diagnoses:    ICD-10-CM ICD-9-CM   1. Chronic pain of left ankle  M25.572 719.47    G89.29 338.29   2. Peroneal tendon tear, left, subsequent encounter  S86.312D V58.89     844.8   3. Decreased range of motion of left ankle  M25.672 719.57   4. Antalgic gait  R26.89 781.2       Subjective Evaluation    History of Present Illness    Subjective comment: Pt has 8/10 pain today.       Objective   See Exercise, Manual, and Modality Logs for complete treatment.       Assessment & Plan       Assessment  Assessment details: Tx today consisted of mh to ankle followed by manual stretching; there ex for ankle mobility and stability; functional stepping and step overs and proprioceptive training for improved balance with added fitter balance and foam balloon tapping.  Pt reported 5/10 post pain.    Plan  Plan details: Will cont to follow for improved ankle mobility and stability for improved ADLs.          Timed:         Manual Therapy:         mins  09345;     Therapeutic Exercise:    29     mins  82557;     Neuromuscular Jazmin:    15    mins  19787;    Therapeutic Activity:          mins  59128;     Gait Training:           mins  65654;     Ultrasound:          mins  08867;    Ionto                                   mins   57176  Self Care                            mins   45746  Canalith Repos         mins 44259      Un-Timed:  Electrical Stimulation:         mins  04436 ( );  Dry Needling          mins self-pay  Traction          mins 43113      Timed Treatment:   44   mins   Total Treatment:     50   mins(6 min ice)    Win Rodriguez PT  [place] License: [id number]      Electronically signed by Win Rodriguez PT, 24, 11:15 AM EST

## 2024-02-09 ENCOUNTER — APPOINTMENT (OUTPATIENT)
Dept: GENERAL RADIOLOGY | Facility: HOSPITAL | Age: 54
End: 2024-02-09
Payer: COMMERCIAL

## 2024-02-09 ENCOUNTER — HOSPITAL ENCOUNTER (EMERGENCY)
Facility: HOSPITAL | Age: 54
Discharge: HOME | End: 2024-02-10
Attending: EMERGENCY MEDICINE
Payer: COMMERCIAL

## 2024-02-09 VITALS
TEMPERATURE: 98.4 F | HEIGHT: 67 IN | OXYGEN SATURATION: 97 % | BODY MASS INDEX: 36.1 KG/M2 | WEIGHT: 230 LBS | RESPIRATION RATE: 18 BRPM | DIASTOLIC BLOOD PRESSURE: 76 MMHG | HEART RATE: 92 BPM | SYSTOLIC BLOOD PRESSURE: 131 MMHG

## 2024-02-09 DIAGNOSIS — S69.92XA INJURY OF LEFT THUMB, INITIAL ENCOUNTER: Primary | ICD-10-CM

## 2024-02-09 PROCEDURE — 73130 X-RAY EXAM OF HAND: CPT

## 2024-02-09 PROCEDURE — 73140 X-RAY EXAM OF FINGER(S): CPT

## 2024-02-09 PROCEDURE — 99282 EMERGENCY DEPT VISIT SF MDM: CPT

## 2024-02-10 PROCEDURE — 73130 X-RAY EXAM OF HAND: CPT | Performed by: RADIOLOGY

## 2024-02-10 PROCEDURE — 73140 X-RAY EXAM OF FINGER(S): CPT | Performed by: RADIOLOGY

## 2024-02-10 NOTE — ED PROVIDER NOTES
"Subjective   History of Present Illness  Injured left thumb playing basketball    Finger Injury  Location:  Left thumb  Severity:  Mild  Onset quality:  Sudden  Duration:  2 hours  Timing:  Constant  Chronicity:  New      Review of Systems   Constitutional: Negative.    HENT: Negative.     Eyes: Negative.    Respiratory: Negative.     Cardiovascular: Negative.    Gastrointestinal: Negative.    Endocrine: Negative.    Genitourinary: Negative.    Musculoskeletal:         Left thumb injury   Allergic/Immunologic: Negative.    Neurological: Negative.    Hematological: Negative.        Past Medical History:   Diagnosis Date    Allergic     Anxiety     Arthritis     Asthma     Body piercing     REPORTS CYLICONE IN EARS    Clotting disorder 2004    had a knee surgery    Coronary artery disease     Depression     DVT (deep venous thrombosis)     RIGHT RIGHT KNEE AFTER SURGERY YEARS AGO IN 2001 OR 2004    Elevated cholesterol     Gastric ulcer     GERD (gastroesophageal reflux disease)     H/O migraine     Headache     Heart attack     REPORTS \"LIGHT HEART ATTACK A LONG TIME AGO\"  \"EARLY 90'S\"    History of seizures     REPORTS LAST EPISODE WAS AROUND 1995.    Hostility     Hyperlipidemia     Hypertension     Knee pain, acute     Left    Low back pain     Lyme disease     Migraine     MRSA (methicillin resistant Staphylococcus aureus)     REPORTS LAST TESTED + 2004. WAS TREATED HE REPORTS.  RIGHT ARM, RIGHT KNEE.    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     Obesity     Poor historian     [place] spotted fever     Seizures     Sleep apnea     Tattoo     Wears glasses        Allergies   Allergen Reactions    Ciprofloxacin Anaphylaxis and Hives    Miralax [Polyethylene Glycol] Itching and Rash    Mobic [Meloxicam] Other (See Comments)     Pt states, \"It make my feet and hands go numb and I can't hardly walk.\"     Paxil [Paroxetine Hcl] Shortness Of Breath     Chest pain     Peanut-Containing Drug Products Anaphylaxis    Penicillins " Anaphylaxis    Pristiq [Desvenlafaxine Succinate Er] Dizziness    Sulfa Antibiotics Anaphylaxis, Itching and Rash    Doxycycline Hives    Fish-Derived Products Hives    Isosorbide Nitrate Rash     Rash, hives, had to use inhaler.     Movantik [Naloxegol] Rash    Seroquel [Quetiapine] Hives and Rash    Buspar [Buspirone] Rash    Clarithromycin Rash    Clindamycin/Lincomycin Rash    Codeine Rash    Contrast Dye (Echo Or Unknown Ct/Mr) Itching and Rash    Diltiazem Rash    Flomax [Tamsulosin] Hives    Gabapentin Rash    Iodinated Contrast Media Itching and Rash    Keflex [Cephalexin] Rash    Levocetirizine Rash    Linzess [Linaclotide] Rash    Metoprolol Rash    Prednisone Rash and Other (See Comments)     Face, feet, and legs go completely numb per patient    Robitussin Cough+ Chest Max St [Dextromethorphan-Guaifenesin] Itching    Shrimp (Diagnostic) Rash    Spironolactone Rash    Viibryd [Vilazodone Hcl] Itching and Rash    Zoloft [Sertraline Hcl] Hives and Itching       Past Surgical History:   Procedure Laterality Date    ABDOMINAL SURGERY      BACK SURGERY      BRAIN SURGERY  1986    Tumor removal     CARDIAC CATHETERIZATION N/A 9/28/2018    Procedure: Left Heart Cath;  Surgeon: Leandro Daily MD;  Location: [facility] CATH INVASIVE LOCATION;  Service: Cardiology    CHOLECYSTECTOMY      COLONOSCOPY      COLONOSCOPY N/A 8/2/2021    Procedure: COLONOSCOPY FOR SCREENING;  Surgeon: Irving Azar MD;  Location: [facility];  Service: Gastroenterology;  Laterality: N/A;    CYST REMOVAL      pilonidal cyst    ELBOW EPICONDYLECTOMY Right 7/23/2020    Procedure: LATERAL EPICONDYLAR RELEASE;  Surgeon: Jose Ruiz MD;  Location: [facility] OR;  Service: Orthopedics;  Laterality: Right;    ENDOSCOPY      ENDOSCOPY N/A 8/2/2021    Procedure: ESOPHAGOGASTRODUODENOSCOPY WITH BIOPSY;  Surgeon: Irving Azar MD;  Location: [facility] OR;  Service: Gastroenterology;  Laterality: N/A;  esophageal  dilatation to 20mm    FRACTURE SURGERY Right     elbow    KNEE ARTHROSCOPY Left 10/20/2017    Procedure: Diagnostic arthroscopy left knee with chondroplasty;  Surgeon: Marco Aguirre MD;  Location: [facility] OR;  Service:     KNEE ARTHROSCOPY Left 1/11/2021    Procedure: KNEE DIAGNOSTIC ARTHROSCOPY WITH  CHONDROPLASTY patella, femoral and medial;  Surgeon: Raul Eagle MD;  Location: [facility] OR;  Service: Orthopedics;  Laterality: Left;    KNEE SURGERY Right     MOUTH SURGERY      FULL MOUTH EXTRACTION    OTHER SURGICAL HISTORY      REPORTS 7 TICKS REMOVED FROM RIGHT ARM IN 2001 OR 2002    TENNIS ELBOW RELEASE Right 7/23/2020    Procedure: RIGHT TENNIS ELBOW RELEASE;  Surgeon: Jose Ruiz MD;  Location: [facility] OR;  Service: Orthopedics;  Laterality: Right;    TUMOR EXCISION      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       Family History   Problem Relation Age of Onset    Diabetes Mother     Hypertension Mother     Stroke Mother     Diabetes Father     Skin cancer Father     Hypertension Father     Heart attack Father     Diabetes Brother     Hypertension Brother     Heart disease Maternal Aunt     Heart disease Maternal Uncle     Heart disease Paternal Aunt     Heart disease Paternal Uncle     Heart disease Maternal Grandmother     Heart disease Maternal Grandfather     Heart disease Paternal Grandmother     Heart disease Paternal Grandfather        Social History     Socioeconomic History    Marital status:      Spouse name: Becca    Number of children: 2    Years of education: 12   Tobacco Use    Smoking status: Every Day     Packs/day: 1.00     Years: 17.00     Additional pack years: 0.00     Total pack years: 17.00     Types: Cigars, Cigarettes     Start date: 4/11/2022     Passive exposure: Current    Smokeless tobacco: Never   Vaping Use    Vaping Use: Never used   Substance and Sexual Activity    Alcohol use: No    Drug use: No    Sexual activity: Defer     Partners: Female      Birth control/protection: None           Objective   Physical Exam  Vitals and nursing note reviewed.   Constitutional:       Appearance: Normal appearance.   HENT:      Head: Normocephalic.      Nose: Nose normal.      Mouth/Throat:      Mouth: Mucous membranes are moist.   Eyes:      Pupils: Pupils are equal, round, and reactive to light.   Cardiovascular:      Rate and Rhythm: Normal rate.   Pulmonary:      Effort: Pulmonary effort is normal.   Abdominal:      General: Abdomen is flat.   Musculoskeletal:      Cervical back: Normal range of motion.      Comments: Left thumb tender to palpation   Skin:     General: Skin is warm and dry.   Neurological:      General: No focal deficit present.      Mental Status: He is alert.   Psychiatric:         Mood and Affect: Mood normal.         Procedures           ED Course                                             Medical Decision Making  Problems Addressed:  Injury of left thumb, initial encounter: complicated acute illness or injury    Amount and/or Complexity of Data Reviewed  Radiology: ordered.        Final diagnoses:   Injury of left thumb, initial encounter       ED Disposition  ED Disposition       ED Disposition   Discharge    Condition   Stable    Comment   --               Tiera Valenzuela, APRN  [address] [id number] [phone]    Schedule an appointment as soon as possible for a visit   If symptoms worsen         Medication List      No changes were made to your prescriptions during this visit.            Jerry Dwyer MD  02/10/24 0038

## 2024-02-15 ENCOUNTER — TREATMENT (OUTPATIENT)
Dept: PHYSICAL THERAPY | Facility: CLINIC | Age: 54
End: 2024-02-15
Payer: COMMERCIAL

## 2024-02-15 DIAGNOSIS — R26.89 ANTALGIC GAIT: ICD-10-CM

## 2024-02-15 DIAGNOSIS — M25.672 DECREASED RANGE OF MOTION OF LEFT ANKLE: ICD-10-CM

## 2024-02-15 DIAGNOSIS — S86.312D PERONEAL TENDON TEAR, LEFT, SUBSEQUENT ENCOUNTER: ICD-10-CM

## 2024-02-15 DIAGNOSIS — M25.572 CHRONIC PAIN OF LEFT ANKLE: Primary | ICD-10-CM

## 2024-02-15 DIAGNOSIS — G89.29 CHRONIC PAIN OF LEFT ANKLE: Primary | ICD-10-CM

## 2024-02-22 ENCOUNTER — TREATMENT (OUTPATIENT)
Dept: PHYSICAL THERAPY | Facility: CLINIC | Age: 54
End: 2024-02-22
Payer: COMMERCIAL

## 2024-02-22 DIAGNOSIS — M25.672 DECREASED RANGE OF MOTION OF LEFT ANKLE: ICD-10-CM

## 2024-02-22 DIAGNOSIS — M25.572 CHRONIC PAIN OF LEFT ANKLE: Primary | ICD-10-CM

## 2024-02-22 DIAGNOSIS — S86.312D PERONEAL TENDON TEAR, LEFT, SUBSEQUENT ENCOUNTER: ICD-10-CM

## 2024-02-22 DIAGNOSIS — R26.89 ANTALGIC GAIT: ICD-10-CM

## 2024-02-22 DIAGNOSIS — G89.29 CHRONIC PAIN OF LEFT ANKLE: Primary | ICD-10-CM
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
## 2024-02-26 DIAGNOSIS — F41.8 ANXIETY ABOUT HEALTH: ICD-10-CM

## 2024-02-26 RX ORDER — DIAZEPAM 10 MG/1
10 TABLET ORAL ONCE
Qty: 1 TABLET | Refills: 0 | Status: SHIPPED | OUTPATIENT
Start: 2024-02-26 | End: 2024-02-26

## 2024-02-29 ENCOUNTER — TREATMENT (OUTPATIENT)
Dept: PHYSICAL THERAPY | Facility: CLINIC | Age: 54
End: 2024-02-29
Payer: COMMERCIAL

## 2024-02-29 DIAGNOSIS — M25.672 DECREASED RANGE OF MOTION OF LEFT ANKLE: ICD-10-CM

## 2024-02-29 DIAGNOSIS — R26.89 ANTALGIC GAIT: ICD-10-CM

## 2024-02-29 DIAGNOSIS — S86.312D PERONEAL TENDON TEAR, LEFT, SUBSEQUENT ENCOUNTER: ICD-10-CM

## 2024-02-29 DIAGNOSIS — G89.29 CHRONIC PAIN OF LEFT ANKLE: Primary | ICD-10-CM

## 2024-02-29 DIAGNOSIS — M25.572 CHRONIC PAIN OF LEFT ANKLE: Primary | ICD-10-CM

## 2024-02-29 NOTE — PROGRESS NOTES
Physical Therapy Daily Treatment Note      Patient: Jaquan Mckay   : 1970  Referring practitioner: Raul Eagle MD  Date of Initial Visit: Type: THERAPY  Noted: 2024  Today's Date: 2024  Patient seen for 6 sessions       Visit Diagnoses:    ICD-10-CM ICD-9-CM   1. Chronic pain of left ankle  M25.572 719.47    G89.29 338.29   2. Peroneal tendon tear, left, subsequent encounter  S86.312D V58.89     844.8   3. Decreased range of motion of left ankle  M25.672 719.57   4. Antalgic gait  R26.89 781.2       Subjective Evaluation    History of Present Illness    Subjective comment: Pt has 8/10 pain today.       Objective   See Exercise, Manual, and Modality Logs for complete treatment.       Assessment & Plan       Assessment  Assessment details: Tx today consisted of mh to ankle followed by manual stretching; standing exercises, functional stepping and proprioceptive training and ended with ice post tx.  Pt demonstrated improved ability with pod step overs, foam tandem and TG today.  Pt cont to report pain with prolonged standing.  Pt reported 6/10 post pain today.    Plan  Plan details: Will follow progressing ankle stability and decreased pain.          Timed:         Manual Therapy:         mins  19510;     Therapeutic Exercise:    28     mins  33242;     Neuromuscular Jazmin:    11    mins  65465;    Therapeutic Activity:          mins  50577;     Gait Training:           mins  01827;     Ultrasound:          mins  46930;    Ionto                                   mins   41146  Self Care                            mins   54337  Canalith Repos         mins 93061      Un-Timed:  Electrical Stimulation:         mins  29113 (MC );  Dry Needling          mins self-pay  Traction          mins 28168      Timed Treatment:   39   mins   Total Treatment:     50   mins(5 min mh and 6 min ice)    Win Rodriguez PT  [place] License: [id number]      Electronically signed by Win Rodriguez PT, 24,  10:58 AM EST

## 2024-03-08 ENCOUNTER — TREATMENT (OUTPATIENT)
Dept: PHYSICAL THERAPY | Facility: CLINIC | Age: 54
End: 2024-03-08
Payer: COMMERCIAL

## 2024-03-08 DIAGNOSIS — S86.312D PERONEAL TENDON TEAR, LEFT, SUBSEQUENT ENCOUNTER: ICD-10-CM

## 2024-03-08 DIAGNOSIS — G89.29 CHRONIC PAIN OF LEFT ANKLE: Primary | ICD-10-CM

## 2024-03-08 DIAGNOSIS — M25.672 DECREASED RANGE OF MOTION OF LEFT ANKLE: ICD-10-CM

## 2024-03-08 DIAGNOSIS — M25.572 CHRONIC PAIN OF LEFT ANKLE: Primary | ICD-10-CM

## 2024-03-08 DIAGNOSIS — R26.89 ANTALGIC GAIT: ICD-10-CM

## 2024-03-08 NOTE — PROGRESS NOTES
Physical Therapy Daily Treatment Note      Patient: Jaquan Mckay   : 1970  Referring practitioner: Raul Eagle MD  Date of Initial Visit: Type: THERAPY  Noted: 2024  Today's Date: 3/8/2024  Patient seen for 7 sessions       Visit Diagnoses:    ICD-10-CM ICD-9-CM   1. Chronic pain of left ankle  M25.572 719.47    G89.29 338.29   2. Peroneal tendon tear, left, subsequent encounter  S86.312D V58.89     844.8   3. Decreased range of motion of left ankle  M25.672 719.57   4. Antalgic gait  R26.89 781.2       Subjective Evaluation    History of Present Illness    Subjective comment: Pt has 7/10 pain today.     Objective   See Exercise, Manual, and Modality Logs for complete treatment.       Assessment & Plan       Assessment  Assessment details: Tx today consisted of mh to ankle followed by stretching and exercises for improved ankle and leg stability and balance and ended with ice.  Pt responded well to added balance activities with [employer] balance and Bosu balance.  Pt reported 3/10 post pain today.    Plan  Plan details: Will follow progressing leg stability and improved balance.            Timed:         Manual Therapy:         mins  15866;     Therapeutic Exercise:    32     mins  74179;     Neuromuscular Jazmin:    12    mins  03572;    Therapeutic Activity:          mins  07806;     Gait Training:           mins  66407;     Ultrasound:          mins  89206;    Ionto                                   mins   95202  Self Care                            mins   41729  Canalith Repos         mins 36109      Un-Timed:  Electrical Stimulation:         mins  06615 (MC );  Dry Needling          mins self-pay  Traction          mins 20164      Timed Treatment:   42   mins   Total Treatment:     53   mins(5min mh and 6 min ice)    Win Rodriguez PT  [place] License: [id number]      Electronically signed by Win Rodriguez PT, 24, 11:10 AM EST

## 2024-03-11 ENCOUNTER — TREATMENT (OUTPATIENT)
Dept: PHYSICAL THERAPY | Facility: CLINIC | Age: 54
End: 2024-03-11
Payer: COMMERCIAL

## 2024-03-11 DIAGNOSIS — G89.29 CHRONIC PAIN OF LEFT ANKLE: Primary | ICD-10-CM

## 2024-03-11 DIAGNOSIS — M25.672 DECREASED RANGE OF MOTION OF LEFT ANKLE: ICD-10-CM

## 2024-03-11 DIAGNOSIS — R26.89 ANTALGIC GAIT: ICD-10-CM

## 2024-03-11 DIAGNOSIS — S86.312D PERONEAL TENDON TEAR, LEFT, SUBSEQUENT ENCOUNTER: ICD-10-CM

## 2024-03-11 DIAGNOSIS — M25.572 CHRONIC PAIN OF LEFT ANKLE: Primary | ICD-10-CM

## 2024-03-11 NOTE — PROGRESS NOTES
Physical Therapy Daily Treatment Note      Patient: Jaquan Mckay   : 1970  Referring practitioner: Raul Eagle MD  Date of Initial Visit: Type: THERAPY  Noted: 2024  Today's Date: 3/11/2024  Patient seen for 8 sessions       Visit Diagnoses:    ICD-10-CM ICD-9-CM   1. Chronic pain of left ankle  M25.572 719.47    G89.29 338.29   2. Peroneal tendon tear, left, subsequent encounter  S86.312D V58.89     844.8   3. Decreased range of motion of left ankle  M25.672 719.57   4. Antalgic gait  R26.89 781.2       Subjective Evaluation    History of Present Illness    Subjective comment: Pt has 7/10 pain today.       Objective   See Exercise, Manual, and Modality Logs for complete treatment.       Assessment & Plan       Assessment  Assessment details: Tx today consisted of mh to ankle followed by stretching and exercises for improved ankle and leg stability and balance and ended with ice.  Pt noted to have improvements with balance activities and reported 3/10 post pain.      Plan  Plan details: Will follow progressing leg stability and improved balance.            Timed:         Manual Therapy:         mins  30575;     Therapeutic Exercise:    29     mins  04814;     Neuromuscular Jazmin:    10    mins  98936;    Therapeutic Activity:          mins  97428;     Gait Training:           mins  96883;     Ultrasound:          mins  73306;    Ionto                                   mins   10730  Self Care                            mins   70085  Canalith Repos         mins 34140      Un-Timed:  Electrical Stimulation:         mins  27291 (MC );  Dry Needling          mins self-pay  Traction          mins 85320      Timed Treatment:   39   mins   Total Treatment:     50  mins(5 min mh and 6 min ice)    Win Rodriguez PT  [place] License: [id number]      Electronically signed by Win Rodriguez PT, 24, 1:01 PM EDT

## 2024-03-12 ENCOUNTER — OFFICE VISIT (OUTPATIENT)
Dept: FAMILY MEDICINE CLINIC | Facility: CLINIC | Age: 54
End: 2024-03-12
Payer: COMMERCIAL

## 2024-03-12 VITALS
SYSTOLIC BLOOD PRESSURE: 118 MMHG | HEART RATE: 100 BPM | BODY MASS INDEX: 36.57 KG/M2 | HEIGHT: 67 IN | RESPIRATION RATE: 20 BRPM | DIASTOLIC BLOOD PRESSURE: 70 MMHG | OXYGEN SATURATION: 98 % | WEIGHT: 233 LBS

## 2024-03-12 DIAGNOSIS — F51.04 PSYCHOPHYSIOLOGICAL INSOMNIA: Primary | ICD-10-CM

## 2024-03-12 DIAGNOSIS — R51.9 WORSENING HEADACHES: ICD-10-CM

## 2024-03-12 DIAGNOSIS — R05.1 ACUTE COUGH: ICD-10-CM

## 2024-03-12 DIAGNOSIS — I10 ESSENTIAL HYPERTENSION: ICD-10-CM

## 2024-03-12 DIAGNOSIS — E66.01 CLASS 2 SEVERE OBESITY DUE TO EXCESS CALORIES WITH SERIOUS COMORBIDITY AND BODY MASS INDEX (BMI) OF 36.0 TO 36.9 IN ADULT: ICD-10-CM

## 2024-03-12 DIAGNOSIS — Z98.890 HISTORY OF ANKLE SURGERY: ICD-10-CM

## 2024-03-12 PROCEDURE — 3074F SYST BP LT 130 MM HG: CPT | Performed by: NURSE PRACTITIONER

## 2024-03-12 PROCEDURE — 1160F RVW MEDS BY RX/DR IN RCRD: CPT | Performed by: NURSE PRACTITIONER

## 2024-03-12 PROCEDURE — 99214 OFFICE O/P EST MOD 30 MIN: CPT | Performed by: NURSE PRACTITIONER

## 2024-03-12 PROCEDURE — 3078F DIAST BP <80 MM HG: CPT | Performed by: NURSE PRACTITIONER

## 2024-03-12 PROCEDURE — 1159F MED LIST DOCD IN RCRD: CPT | Performed by: NURSE PRACTITIONER

## 2024-03-12 RX ORDER — TEMAZEPAM 15 MG/1
15 CAPSULE ORAL NIGHTLY PRN
Qty: 30 CAPSULE | Refills: 0 | Status: SHIPPED | OUTPATIENT
Start: 2024-03-12

## 2024-03-12 RX ORDER — GUAIFENESIN 600 MG/1
1200 TABLET, EXTENDED RELEASE ORAL 2 TIMES DAILY
Qty: 20 TABLET | Refills: 0 | Status: SHIPPED | OUTPATIENT
Start: 2024-03-12

## 2024-03-12 NOTE — PROGRESS NOTES
"Subjective   Jaquan Mckay is a 53 y.o. male.     Chief Complaint   Patient presents with    Foot Pain    Hypertension       History of Present Illness     Hypertension-chronic and ongoing.  Patient is currently taking lisinopril 30 mg.  No negative side effects.  He denies chest pain.  Reports \"not had any in the head while\".  No concerns for palpitations.  He does have occasional shortness of breath but he also has history of asthma.  Foot pain-continues under the care of physical therapy.  He is going 2 times per week.  He is having popping in his ankle joint.  He reports that he is not able to bear weight when it pops.   He continues to wear his ankle support. He will not see Ortho surgeon until his PT is complete.  He reports that they will be changing his foot exercises.   Headache-ongoing.  Patient was unable to lay for MRI.  The test was canceled.  He reports that he was not able to tolerate the equipment on his face/head.  He reports that he felt like his face and nose was being \"mash down\" he started to feel somewhat claustrophobic despite medication for calming.  Fall-last one yesterday.  Reports he was coming in the door of his apartment when his left ankle popped and he went down.  Reports that he hit his left elbow.  \"Hurt my foot\".  And he reports that the jerked his back and neck.  He reports that he has had 2 falls in the last 2 weeks.    Cough-feels it is due to PND.  He reports worse in the AM.  He does not feel he has SOA.  Some sensation of dryness.  He is not feeling ill in any other way.  No generalized malaise or bodyaches.  He has not had any fever.  Insomnia-patient reports that he is continuing to have worsening insomnia.  He has been on Restoril in the past and while it was not completely effective he did feel that it helped better than some of the other medications he has tried since that time.  He would like to try to resume medication to see if he can get any benefit.  He is difficulty " "falling asleep as well as staying asleep.    The following portions of the patient's history were reviewed and updated as appropriate: CC, ROS, allergies, current medications, past family history, past medical history, past social history, past surgical history and problem list.      Review of Systems   Constitutional:  Positive for fatigue. Negative for appetite change, unexpected weight gain and unexpected weight loss.   HENT:  Positive for postnasal drip. Negative for congestion, ear pain, rhinorrhea, sore throat, swollen glands, trouble swallowing and voice change.    Eyes:  Negative for blurred vision, double vision, pain and visual disturbance.   Respiratory:  Positive for cough. Negative for chest tightness, shortness of breath and wheezing.    Cardiovascular:  Negative for chest pain, palpitations and leg swelling.   Gastrointestinal:  Negative for abdominal pain, blood in stool, constipation, diarrhea, nausea, vomiting and indigestion.   Endocrine: Negative for cold intolerance and heat intolerance.   Genitourinary:  Negative for dysuria, hematuria and urgency.   Musculoskeletal:  Positive for arthralgias, back pain, gait problem and myalgias. Negative for joint swelling.   Skin:  Negative for color change and skin lesions.   Allergic/Immunologic: Negative.    Neurological:  Negative for dizziness, numbness and headache.   Hematological: Negative.    Psychiatric/Behavioral:  Positive for dysphoric mood, sleep disturbance and stress. Negative for suicidal ideas. The patient is not nervous/anxious.    All other systems reviewed and are negative.      Objective     /70   Pulse 100   Resp 20   Ht 170.2 cm (67.01\")   Wt 106 kg (233 lb)   SpO2 98%   BMI 36.48 kg/m²     Physical Exam  Vitals reviewed.   Constitutional:       General: He is not in acute distress.     Appearance: He is well-developed. He is obese. He is not diaphoretic.      Comments: Fatigued appearing     HENT:      Head: Normocephalic " and atraumatic.      Jaw: No tenderness.      Right Ear: Hearing, tympanic membrane, ear canal and external ear normal.      Left Ear: Hearing, tympanic membrane, ear canal and external ear normal.      Nose: Nose normal. Rhinorrhea present. No nasal tenderness or congestion.      Right Sinus: No maxillary sinus tenderness or frontal sinus tenderness.      Left Sinus: No maxillary sinus tenderness or frontal sinus tenderness.      Mouth/Throat:      Lips: Pink.      Mouth: Mucous membranes are moist.      Pharynx: Oropharynx is clear. Uvula midline. Posterior oropharyngeal erythema (thin pnd noted with mild irritation) present.   Eyes:      General: Lids are normal. No scleral icterus.     Extraocular Movements:      Right eye: Normal extraocular motion and no nystagmus.      Left eye: Normal extraocular motion and no nystagmus.      Conjunctiva/sclera: Conjunctivae normal.      Pupils: Pupils are equal, round, and reactive to light.   Neck:      Thyroid: No thyromegaly or thyroid tenderness.      Vascular: No carotid bruit or JVD.      Trachea: No tracheal tenderness.   Cardiovascular:      Rate and Rhythm: Normal rate and regular rhythm.      Pulses:           Dorsalis pedis pulses are 2+ on the right side and 2+ on the left side.        Posterior tibial pulses are 2+ on the right side and 2+ on the left side.      Heart sounds: Normal heart sounds, S1 normal and S2 normal. No murmur heard.  Pulmonary:      Effort: Pulmonary effort is normal. No accessory muscle usage, prolonged expiration or respiratory distress.      Breath sounds: Normal breath sounds.      Comments: Occasionally coughing during exam  Chest:      Chest wall: No tenderness.   Abdominal:      General: Bowel sounds are normal. There is no distension.      Palpations: Abdomen is soft. There is no hepatomegaly, splenomegaly or mass.      Tenderness: There is no abdominal tenderness. There is no right CVA tenderness or left CVA tenderness.       Hernia: No hernia is present.   Musculoskeletal:         General: Tenderness present.      Cervical back: Normal range of motion and neck supple.      Thoracic back: Tenderness present.      Lumbar back: Tenderness (generalized) present.      Right lower leg: No edema.      Left lower leg: No edema.        Feet:       Comments: No muscular atrophy or flaccidity.  Ankle brace in place on left ankle/foot     Lymphadenopathy:      Head:      Right side of head: No submental or submandibular adenopathy.      Left side of head: No submental or submandibular adenopathy.      Cervical: No cervical adenopathy.      Right cervical: No superficial cervical adenopathy.     Left cervical: No superficial cervical adenopathy.   Skin:     General: Skin is warm and dry.      Capillary Refill: Capillary refill takes less than 2 seconds.      Coloration: Skin is not jaundiced or pale.      Findings: No erythema.      Nails: There is no clubbing.   Neurological:      Mental Status: He is alert and oriented to person, place, and time.      Cranial Nerves: No cranial nerve deficit or facial asymmetry.      Sensory: No sensory deficit.      Motor: No weakness, tremor, atrophy or abnormal muscle tone.      Coordination: Coordination normal.      Gait: Gait abnormal (moderate antalgia).      Deep Tendon Reflexes: Reflexes are normal and symmetric.   Psychiatric:         Attention and Perception: He is attentive.         Mood and Affect: Mood is not anxious or depressed.         Speech: Speech normal.         Behavior: Behavior normal. Behavior is cooperative.         Thought Content: Thought content normal.         Cognition and Memory: Cognition normal.         Judgment: Judgment normal.         Diagnoses and all orders for this visit:    1. Psychophysiological insomnia (Primary)  Overview:  With depression and anxiety      Assessment & Plan:  Continue Remeron.  We will add Restoril 15 mg back to regimen.  Continue to work on sleep  Hygiene.    Orders:  -     temazepam (Restoril) 15 MG capsule; Take 1 capsule by mouth At Night As Needed for Sleep.  Dispense: 30 capsule; Refill: 0    2. Acute cough  -     guaiFENesin (Mucinex) 600 MG 12 hr tablet; Take 2 tablets by mouth 2 (Two) Times a Day.  Dispense: 20 tablet; Refill: 0    3. Class 2 severe obesity due to excess calories with serious comorbidity and body mass index (BMI) of 36.0 to 36.9 in adult  Assessment & Plan:  Continue to work on diet changes and be active as physically able.      4. Essential hypertension  Assessment & Plan:  Continue lisinopril 30 mg.  Continue under the care of cardiology.  Ambulatory BP monitoring either at home or random community checks.  Patient to report continued elevations >140/90.  Patient may come by office for checks if needed.         5. Worsening headaches  Comments:  Patient will consider neurology for further workup he will report back at upcoming appointment    6. History of ankle surgery  Comments:  Continue to work with support.  Continue under the care of physical therapy.  Continue under the care of orthopedic       Understands disease processes and need for medications.  Understands reasons for urgent and emergent care.  Patient (& family) verbalized agreement for treatment plan.   Emotional support and active listening provided.  Patient provided time to verbalize feelings.    CHAVEZ reviewed today and consistent.  Will refill prescribed controlled medication today.  Patient is aware they cannot receive narcotics from any other provider except if under care of pain management or speciality clinic.  Risk and benefits of medication use has been reviewed.  History and physical exam exhibit continued safe and appropriate use of controlled substances.  The patient is aware of the potential for addiction and dependence.  This patient has been made aware of the appropriate use of such medications, including potential risk of somnolence, limited ability  to drive and / or work safely, and potential for overdose.  Patient understands not to take medication and drive until they know how medication may affect their cognition/decision making.   It has also been made clear that these medications are for use by this patient only, without concomitant use of alcohol or other substances unless prescribed/advised by medical provider.  Patient understands they may be subject to UDS and pill counts at random.    Patient considered to be low risk for addiction due to use of single controlled medications.  Patient understands and accepts these risks.  Patient need for medication will be reassessed at each visit.  Doses will be adjusted according to patient need and findings.    Goal of TX: Patient will not have any adverse reactions of medication.  Patient will have improvement in sleep hygiene/pattern with use of Restoril as needed as directed.    CHAVEZ Patient Controlled Substance Report (from 3/25/2023 to 3/12/2024)    Dispensed  Strength Quantity Days Supply Provider Pharmacy   01/29/2024 Diazepam 10MG 1 each 1 BHUPINDER,LENNOX [facility]   11/13/2023 Hydrocodone/Acetaminophen 325MG/7.5MG 12 each 4 BHUPINDER,LENNOX [facility]   11/13/2023 Phentermine Hcl 37.5MG 30 each 30 BHUPINDER,LENNOX [facility]   10/14/2023 Phentermine Hcl 37.5MG 30 each 30 BHUPINDER,LENNOX [facility]        RTC 1 month, sooner if needed.             This document has been electronically signed by:  BALDOMERO Thao FNP-C Dragon disclaimer:  Part of this note may be an electronic transcription/translation of spoken language to printed text using the Dragon Dictation System.

## 2024-03-14 ENCOUNTER — TREATMENT (OUTPATIENT)
Dept: PHYSICAL THERAPY | Facility: CLINIC | Age: 54
End: 2024-03-14
Payer: COMMERCIAL

## 2024-03-14 DIAGNOSIS — M25.672 DECREASED RANGE OF MOTION OF LEFT ANKLE: ICD-10-CM

## 2024-03-14 DIAGNOSIS — M25.572 CHRONIC PAIN OF LEFT ANKLE: Primary | ICD-10-CM

## 2024-03-14 DIAGNOSIS — G89.29 CHRONIC PAIN OF LEFT ANKLE: Primary | ICD-10-CM

## 2024-03-14 DIAGNOSIS — R26.89 ANTALGIC GAIT: ICD-10-CM

## 2024-03-14 DIAGNOSIS — S86.312D PERONEAL TENDON TEAR, LEFT, SUBSEQUENT ENCOUNTER: ICD-10-CM

## 2024-03-14 NOTE — PROGRESS NOTES
Physical Therapy Daily Treatment Note      Patient: Jaquan Mckay   : 1970  Referring practitioner: Raul Eagle MD  Date of Initial Visit: Type: THERAPY  Noted: 2024  Today's Date: 3/14/2024  Patient seen for 9 sessions       Visit Diagnoses:    ICD-10-CM ICD-9-CM   1. Chronic pain of left ankle  M25.572 719.47    G89.29 338.29   2. Peroneal tendon tear, left, subsequent encounter  S86.312D V58.89     844.8   3. Decreased range of motion of left ankle  M25.672 719.57   4. Antalgic gait  R26.89 781.2       Subjective Evaluation    History of Present Illness    Subjective comment: Pt had a fall two days ago and strained his left foot.  The patient was evaluted by his PCP and was cleared to resume therapy. Pt demonstrated good wt bearing on foot today and utilizes ankle brace with gait.       Objective   See Exercise, Manual, and Modality Logs for complete treatment.       Assessment & Plan       Assessment  Assessment details: Tx today consisted of mh to ankle followed by stretching and exercises for improved ankle and leg stability and balance and ended with ice.  Pt evaluated prior to tx with no swelling or abnormal tenderness.  Pt responded well to standing activities and balance activities with no distress. Pt reported decreased pain to 3/10 post tx.    Plan  Plan details: Will follow progressing leg stability and improved balance.            Timed:         Manual Therapy:         mins  43878;     Therapeutic Exercise:    29     mins  05753;     Neuromuscular Jazmin:    13    mins  44895;    Therapeutic Activity:          mins  51686;     Gait Training:           mins  22499;     Ultrasound:          mins  50511;    Ionto                                   mins   10899  Self Care                            mins   06990  Canalith Repos         mins 28369      Un-Timed:  Electrical Stimulation:         mins  46526 ( );  Dry Needling          mins self-pay  Traction          mins 32814      Timed  Treatment:   42   mins   Total Treatment:     53   mins(5 min mh and 6 min ice)    Win Rodriguez PT  [place] License: [id number]      Electronically signed by Win Rodriguez PT, 03/14/24, 11:16 AM EDT

## 2024-03-18 ENCOUNTER — TREATMENT (OUTPATIENT)
Dept: PHYSICAL THERAPY | Facility: CLINIC | Age: 54
End: 2024-03-18
Payer: COMMERCIAL

## 2024-03-18 DIAGNOSIS — G89.29 CHRONIC PAIN OF LEFT ANKLE: Primary | ICD-10-CM

## 2024-03-18 DIAGNOSIS — R26.89 ANTALGIC GAIT: ICD-10-CM

## 2024-03-18 DIAGNOSIS — M25.572 CHRONIC PAIN OF LEFT ANKLE: Primary | ICD-10-CM

## 2024-03-18 DIAGNOSIS — M25.672 DECREASED RANGE OF MOTION OF LEFT ANKLE: ICD-10-CM

## 2024-03-18 DIAGNOSIS — S86.312D PERONEAL TENDON TEAR, LEFT, SUBSEQUENT ENCOUNTER: ICD-10-CM

## 2024-03-18 NOTE — PROGRESS NOTES
Physical Therapy Daily Treatment Note      Patient: Jaquan Mckay   : 1970  Referring practitioner: Raul Eagle MD  Date of Initial Visit: Type: THERAPY  Noted: 2024  Today's Date: 3/18/2024  Patient seen for 10 sessions       Visit Diagnoses:    ICD-10-CM ICD-9-CM   1. Chronic pain of left ankle  M25.572 719.47    G89.29 338.29   2. Peroneal tendon tear, left, subsequent encounter  S86.312D V58.89     844.8   3. Decreased range of motion of left ankle  M25.672 719.57   4. Antalgic gait  R26.89 781.2       Subjective Evaluation    History of Present Illness    Subjective comment: Pt reports having 7/10 pain today.       Objective   See Exercise, Manual, and Modality Logs for complete treatment.       Assessment & Plan       Assessment  Assessment details: Tx today consisted of mh to ankle followed by exercises for improved ankle mobility and stability; proprioceptive training and ended with ice for decreased inflammation.  Pt responded well to added bosu circles today with no distress.    Plan  Plan details: Will follow progressing ankle stability and decreased pain.          Timed:         Manual Therapy:         mins  73390;     Therapeutic Exercise:    31     mins  61511;     Neuromuscular Jazmin:   12     mins  21652;    Therapeutic Activity:          mins  07181;     Gait Training:           mins  98558;     Ultrasound:          mins  40280;    Ionto                                   mins   52597  Self Care                            mins   44018  Canalith Repos         mins 38051      Un-Timed:  Electrical Stimulation:         mins  83802 (MC );  Dry Needling          mins self-pay  Traction          mins 35862      Timed Treatment:   43   mins   Total Treatment:     54   mins(5min mh and 6 min ice)    Win Rodriguez PT  [place] License: [id number]      Electronically signed by Win Rodriguez PT, 24, 12:58 PM EDT

## 2024-03-20 ENCOUNTER — LAB (OUTPATIENT)
Dept: LAB | Facility: HOSPITAL | Age: 54
End: 2024-03-20
Payer: COMMERCIAL

## 2024-03-20 ENCOUNTER — TREATMENT (OUTPATIENT)
Dept: PHYSICAL THERAPY | Facility: CLINIC | Age: 54
End: 2024-03-20
Payer: COMMERCIAL

## 2024-03-20 DIAGNOSIS — M25.672 DECREASED RANGE OF MOTION OF LEFT ANKLE: ICD-10-CM

## 2024-03-20 DIAGNOSIS — S86.312D PERONEAL TENDON TEAR, LEFT, SUBSEQUENT ENCOUNTER: ICD-10-CM

## 2024-03-20 DIAGNOSIS — G89.29 CHRONIC PAIN OF LEFT ANKLE: Primary | ICD-10-CM

## 2024-03-20 DIAGNOSIS — M25.572 CHRONIC PAIN OF LEFT ANKLE: Primary | ICD-10-CM

## 2024-03-20 DIAGNOSIS — R26.89 ANTALGIC GAIT: ICD-10-CM

## 2024-03-20 LAB
25(OH)D3 SERPL-MCNC: 34.6 NG/ML (ref 30–100)
ALBUMIN SERPL-MCNC: 4.2 G/DL (ref 3.5–5.2)
ALBUMIN/GLOB SERPL: 1.1 G/DL
ALP SERPL-CCNC: 109 U/L (ref 39–117)
ALT SERPL W P-5'-P-CCNC: 24 U/L (ref 1–41)
ANION GAP SERPL CALCULATED.3IONS-SCNC: 11.3 MMOL/L (ref 5–15)
AST SERPL-CCNC: 23 U/L (ref 1–40)
BASOPHILS # BLD AUTO: 0.02 10*3/MM3 (ref 0–0.2)
BASOPHILS NFR BLD AUTO: 0.3 % (ref 0–1.5)
BILIRUB SERPL-MCNC: 0.3 MG/DL (ref 0–1.2)
BUN SERPL-MCNC: 17 MG/DL (ref 6–20)
BUN/CREAT SERPL: 16.8 (ref 7–25)
CALCIUM SPEC-SCNC: 9.2 MG/DL (ref 8.6–10.5)
CHLORIDE SERPL-SCNC: 104 MMOL/L (ref 98–107)
CHOLEST SERPL-MCNC: 214 MG/DL (ref 0–200)
CO2 SERPL-SCNC: 22.7 MMOL/L (ref 22–29)
CREAT SERPL-MCNC: 1.01 MG/DL (ref 0.76–1.27)
DEPRECATED RDW RBC AUTO: 42.3 FL (ref 37–54)
EGFRCR SERPLBLD CKD-EPI 2021: 90 ML/MIN/1.73
EOSINOPHIL # BLD AUTO: 0.08 10*3/MM3 (ref 0–0.4)
EOSINOPHIL NFR BLD AUTO: 1.3 % (ref 0.3–6.2)
ERYTHROCYTE [DISTWIDTH] IN BLOOD BY AUTOMATED COUNT: 12.3 % (ref 12.3–15.4)
GLOBULIN UR ELPH-MCNC: 3.7 GM/DL
GLUCOSE SERPL-MCNC: 106 MG/DL (ref 65–99)
HBA1C MFR BLD: 5.4 % (ref 4.8–5.6)
HCT VFR BLD AUTO: 45.9 % (ref 37.5–51)
HDLC SERPL-MCNC: 65 MG/DL (ref 40–60)
HGB BLD-MCNC: 16.3 G/DL (ref 13–17.7)
IMM GRANULOCYTES # BLD AUTO: 0.01 10*3/MM3 (ref 0–0.05)
IMM GRANULOCYTES NFR BLD AUTO: 0.2 % (ref 0–0.5)
IRON 24H UR-MRATE: 141 MCG/DL (ref 59–158)
IRON SATN MFR SERPL: 48 % (ref 20–50)
LDLC SERPL CALC-MCNC: 136 MG/DL (ref 0–100)
LDLC/HDLC SERPL: 2.06 {RATIO}
LYMPHOCYTES # BLD AUTO: 1.9 10*3/MM3 (ref 0.7–3.1)
LYMPHOCYTES NFR BLD AUTO: 32 % (ref 19.6–45.3)
MCH RBC QN AUTO: 32.9 PG (ref 26.6–33)
MCHC RBC AUTO-ENTMCNC: 35.5 G/DL (ref 31.5–35.7)
MCV RBC AUTO: 92.5 FL (ref 79–97)
MONOCYTES # BLD AUTO: 0.66 10*3/MM3 (ref 0.1–0.9)
MONOCYTES NFR BLD AUTO: 11.1 % (ref 5–12)
NEUTROPHILS NFR BLD AUTO: 3.26 10*3/MM3 (ref 1.7–7)
NEUTROPHILS NFR BLD AUTO: 55.1 % (ref 42.7–76)
NRBC BLD AUTO-RTO: 0 /100 WBC (ref 0–0.2)
PHENYTOIN SERPL-MCNC: 15.4 MCG/ML (ref 10–20)
PLATELET # BLD AUTO: 182 10*3/MM3 (ref 140–450)
PMV BLD AUTO: 9.8 FL (ref 6–12)
POTASSIUM SERPL-SCNC: 4.7 MMOL/L (ref 3.5–5.2)
PROT SERPL-MCNC: 7.9 G/DL (ref 6–8.5)
RBC # BLD AUTO: 4.96 10*6/MM3 (ref 4.14–5.8)
SODIUM SERPL-SCNC: 138 MMOL/L (ref 136–145)
T4 FREE SERPL-MCNC: 0.89 NG/DL (ref 0.93–1.7)
TIBC SERPL-MCNC: 292 MCG/DL (ref 298–536)
TRANSFERRIN SERPL-MCNC: 196 MG/DL (ref 200–360)
TRIGL SERPL-MCNC: 74 MG/DL (ref 0–150)
TSH SERPL DL<=0.05 MIU/L-ACNC: 2.67 UIU/ML (ref 0.27–4.2)
VIT B12 BLD-MCNC: 542 PG/ML (ref 211–946)
VLDLC SERPL-MCNC: 13 MG/DL (ref 5–40)
WBC NRBC COR # BLD AUTO: 5.93 10*3/MM3 (ref 3.4–10.8)

## 2024-03-20 PROCEDURE — 80186 ASSAY OF PHENYTOIN FREE: CPT | Performed by: NURSE PRACTITIONER

## 2024-03-20 PROCEDURE — 80061 LIPID PANEL: CPT | Performed by: NURSE PRACTITIONER

## 2024-03-20 PROCEDURE — 84466 ASSAY OF TRANSFERRIN: CPT | Performed by: NURSE PRACTITIONER

## 2024-03-20 PROCEDURE — 80185 ASSAY OF PHENYTOIN TOTAL: CPT | Performed by: NURSE PRACTITIONER

## 2024-03-20 PROCEDURE — 80053 COMPREHEN METABOLIC PANEL: CPT | Performed by: NURSE PRACTITIONER

## 2024-03-20 PROCEDURE — 82306 VITAMIN D 25 HYDROXY: CPT | Performed by: NURSE PRACTITIONER

## 2024-03-20 PROCEDURE — 83527 ASSAY OF INSULIN: CPT | Performed by: NURSE PRACTITIONER

## 2024-03-20 PROCEDURE — 84403 ASSAY OF TOTAL TESTOSTERONE: CPT | Performed by: NURSE PRACTITIONER

## 2024-03-20 PROCEDURE — 84402 ASSAY OF FREE TESTOSTERONE: CPT | Performed by: NURSE PRACTITIONER

## 2024-03-20 PROCEDURE — 84439 ASSAY OF FREE THYROXINE: CPT | Performed by: NURSE PRACTITIONER

## 2024-03-20 PROCEDURE — 85025 COMPLETE CBC W/AUTO DIFF WBC: CPT | Performed by: NURSE PRACTITIONER

## 2024-03-20 PROCEDURE — 83525 ASSAY OF INSULIN: CPT | Performed by: NURSE PRACTITIONER

## 2024-03-20 PROCEDURE — 83540 ASSAY OF IRON: CPT | Performed by: NURSE PRACTITIONER

## 2024-03-20 PROCEDURE — 84443 ASSAY THYROID STIM HORMONE: CPT | Performed by: NURSE PRACTITIONER

## 2024-03-20 PROCEDURE — 83036 HEMOGLOBIN GLYCOSYLATED A1C: CPT | Performed by: NURSE PRACTITIONER

## 2024-03-20 PROCEDURE — 82607 VITAMIN B-12: CPT | Performed by: NURSE PRACTITIONER

## 2024-03-20 NOTE — PROGRESS NOTES
Physical Therapy Daily Treatment Note      Patient: Jaquan Mckay   : 1970  Referring practitioner: Raul Eagle MD  Date of Initial Visit: Type: THERAPY  Noted: 2024  Today's Date: 3/20/2024  Patient seen for 11 sessions       Visit Diagnoses:    ICD-10-CM ICD-9-CM   1. Chronic pain of left ankle  M25.572 719.47    G89.29 338.29   2. Peroneal tendon tear, left, subsequent encounter  S86.312D V58.89     844.8   3. Decreased range of motion of left ankle  M25.672 719.57   4. Antalgic gait  R26.89 781.2       Subjective Evaluation    History of Present Illness    Subjective comment: Pt has 6/10 pain today.       Objective   See Exercise, Manual, and Modality Logs for complete treatment.       Assessment & Plan       Assessment  Assessment details: Tx today consisted of mh to ankle followed by exercises for improved ankle mobility and stability; proprioceptive training and ended with ice for decreased inflammation.  Tandem standing on foam tapping balloon and UE use added and pt responded well.  Pt reported 4/10 post pain today.    Plan  Plan details: Will follow progressing ankle stability and decreased pain.          Timed:         Manual Therapy:         mins  19120;     Therapeutic Exercise:    29     mins  88829;     Neuromuscular Jazmin:    14    mins  17531;    Therapeutic Activity:          mins  58940;     Gait Training:           mins  48104;     Ultrasound:          mins  38713;    Ionto                                   mins   27261  Self Care                            mins   05034  Canalith Repos         mins 22469      Un-Timed:  Electrical Stimulation:         mins  96664 (MC );  Dry Needling          mins self-pay  Traction          mins 09812      Timed Treatment:   43   mins   Total Treatment:     54   mins(5min mh and 6 min ice)    Win Rodriguez PT  [place] License: [id number]      Electronically signed by Win Rodriguez PT, 24, 12:56 PM EDT

## 2024-03-22 LAB
TESTOST FREE SERPL-MCNC: 5.6 PG/ML (ref 7.2–24)
TESTOST SERPL-MCNC: 705 NG/DL (ref 264–916)

## 2024-03-24 NOTE — ASSESSMENT & PLAN NOTE
Continue Remeron.  We will add Restoril 15 mg back to regimen.  Continue to work on sleep Hygiene.

## 2024-03-25 ENCOUNTER — TREATMENT (OUTPATIENT)
Dept: PHYSICAL THERAPY | Facility: CLINIC | Age: 54
End: 2024-03-25
Payer: COMMERCIAL

## 2024-03-25 DIAGNOSIS — M25.572 CHRONIC PAIN OF LEFT ANKLE: Primary | ICD-10-CM

## 2024-03-25 DIAGNOSIS — R26.89 ANTALGIC GAIT: ICD-10-CM

## 2024-03-25 DIAGNOSIS — G89.29 CHRONIC PAIN OF LEFT ANKLE: Primary | ICD-10-CM

## 2024-03-25 DIAGNOSIS — M25.672 DECREASED RANGE OF MOTION OF LEFT ANKLE: ICD-10-CM

## 2024-03-25 DIAGNOSIS — S86.312D PERONEAL TENDON TEAR, LEFT, SUBSEQUENT ENCOUNTER: ICD-10-CM

## 2024-03-25 LAB — PHENYTOIN FREE SERPL-MCNC: 1 UG/ML (ref 1–2)

## 2024-03-25 NOTE — PROGRESS NOTES
Physical Therapy Daily Treatment Note      Patient: Jaquan Mckay   : 1970  Referring practitioner: Raul Eagle MD  Date of Initial Visit: Type: THERAPY  Noted: 2024  Today's Date: 3/25/2024  Patient seen for 12 sessions       Visit Diagnoses:    ICD-10-CM ICD-9-CM   1. Chronic pain of left ankle  M25.572 719.47    G89.29 338.29   2. Peroneal tendon tear, left, subsequent encounter  S86.312D V58.89     844.8   3. Decreased range of motion of left ankle  M25.672 719.57   4. Antalgic gait  R26.89 781.2       Subjective Evaluation    History of Present Illness    Subjective comment: Pt has 6/10 pain today.       Objective   See Exercise, Manual, and Modality Logs for complete treatment.       Assessment & Plan       Assessment  Assessment details: Tx today consisted of mh to ankle followed by exercises for improved ankle mobility and stability; proprioceptive training and ended with ice for decreased inflammation.  Tandem standing reaching for rainbow added with good response and effort.  Pt reported 3/10 post pain today.    Plan  Plan details: Will follow progressing ankle stability and decreased pain.  57    Timed:         Manual Therapy:         mins  54457;     Therapeutic Exercise:    31     mins  09481;     Neuromuscular Jazmin:    15    mins  41292;    Therapeutic Activity:          mins  01478;     Gait Training:           mins  34496;     Ultrasound:          mins  45568;    Ionto                                   mins   16100  Self Care                            mins   08877  Canalith Repos         mins 83384      Un-Timed:  Electrical Stimulation:         mins  73214 (MC );  Dry Needling          mins self-pay  Traction          mins 36772      Timed Treatment:   46   mins   Total Treatment:     57   mins(5min mh and 6 min ice)    Win Rodriguez PT  [place] License: [id number]      Electronically signed by Win Rodriguez PT, 24, 11:03 AM EDT

## 2024-03-26 LAB
INSULIN FREE SERPL-ACNC: 13 UU/ML
INSULIN SERPL-ACNC: 13 UU/ML

## 2024-03-28 ENCOUNTER — TREATMENT (OUTPATIENT)
Dept: PHYSICAL THERAPY | Facility: CLINIC | Age: 54
End: 2024-03-28
Payer: COMMERCIAL

## 2024-03-28 DIAGNOSIS — R26.89 ANTALGIC GAIT: ICD-10-CM

## 2024-03-28 DIAGNOSIS — M25.572 CHRONIC PAIN OF LEFT ANKLE: Primary | ICD-10-CM

## 2024-03-28 DIAGNOSIS — G89.29 CHRONIC PAIN OF LEFT ANKLE: Primary | ICD-10-CM

## 2024-03-28 DIAGNOSIS — S86.312D PERONEAL TENDON TEAR, LEFT, SUBSEQUENT ENCOUNTER: ICD-10-CM

## 2024-03-28 DIAGNOSIS — M25.672 DECREASED RANGE OF MOTION OF LEFT ANKLE: ICD-10-CM

## 2024-03-28 NOTE — PROGRESS NOTES
Physical Therapy Re Certification Of Plan of Care  Patient: Jaquan Mckay   : 1970  Diagnosis/ICD-10 Code:  Chronic pain of left ankle [M25.572, G89.29]  Referring practitioner: Rual Eagle MD  Date of Initial Visit: Type: THERAPY  Noted: 2024  Today's Date: 3/28/2024  Patient seen for 13 sessions         Visit Diagnoses:    ICD-10-CM ICD-9-CM   1. Chronic pain of left ankle  M25.572 719.47    G89.29 338.29   2. Peroneal tendon tear, left, subsequent encounter  S86.312D V58.89     844.8   3. Decreased range of motion of left ankle  M25.672 719.57   4. Antalgic gait  R26.89 781.2         Jaquan Mckay reports:   Subjective Questionnaire: LEFS: 48%; TUG 5 seconds  Clinical Progress: improved  Home Program Compliance: Yes  Treatment has included: therapeutic exercise, neuromuscular re-education, manual therapy, therapeutic activity, gait training, moist heat, and cryotherapy      Subjective Evaluation    History of Present Illness    Subjective comment: Pt has had improvements with squatting and walking for the past month.Pain  Current pain ratin  At best pain ratin  At worst pain ratin  Location: left heel and ankle             Objective          Active Range of Motion   Left Ankle/Foot   Dorsiflexion (ke): 6 degrees   Plantar flexion: 73 degrees   Inversion: 35 degrees   Eversion: 17 degrees     Strength/Myotome Testing     Left Ankle/Foot   Dorsiflexion: 4  Plantar flexion: 4  Inversion: 4  Eversion: 4          Assessment & Plan       Assessment  Impairments: abnormal coordination, abnormal gait, abnormal muscle firing, abnormal muscle tone, abnormal or restricted ROM, activity intolerance, impaired balance, impaired physical strength, lacks appropriate home exercise program, pain with function, safety issue and weight-bearing intolerance   Functional limitations: carrying objects, lifting, walking, pulling, pushing, uncomfortable because of pain, moving in bed, standing, stooping,  reaching behind back, reaching overhead and unable to perform repetitive tasks   Assessment details: Pt is a 52 y/o male referred to therapy for treatment following a Brostrom repair to the left ankle.  Pt has attended 13 sessions with noted improved LEFs score to 48%, improved ankle ROM in all areas, improved gross ankle strength to 4/5 and a TUG score of 5 seconds.  Pt is motivated to cont for improved ankle stability and improved function.  Prognosis: good    Goals  Plan Goals: STG 6 weeks    1 Pt will improve left ankle DF to neutral.met  2 Pt will perfrom tandem balance 15 seconds.met  3 Pt will report pain no greater than 5/10 with increased walking.not met    LTG 12 weeks    1 Pt will improve his LEFs to less than 20%.not met  2 Pt will demonstrate 4/5 gross ankle strength.met  3 Pt will improve his TUG score to less than 10 sec.met  4 Pt will demonstrate gross left ankle ROM to be with in 5 degrees of right ankle..met      Plan  Therapy options: will be seen for skilled therapy services  Planned modality interventions: cryotherapy, ultrasound, thermotherapy (hydrocollator packs) and iontophoresis  Planned therapy interventions: ADL retraining, balance/weight-bearing training, body mechanics training, flexibility, functional ROM exercises, gait training, home exercise program, IADL retraining, joint mobilization, manual therapy, neuromuscular re-education, postural training, soft tissue mobilization, strengthening, stretching and therapeutic activities  Frequency: 2x week  Duration in weeks: 4  Treatment plan discussed with: patient  Plan details: Will follow for optimal gains.  Moderate Evaluation  28503  Re-evaluation   31839  Therapeutic exercise  51802  Therapeutic activity    88471  Neuromuscular re-education   35254  Manual therapy   41761  Gait training  77922  Unattended e-stim (Medicaid/Medicare)     Moist heat/cryotherapy 78230   Ultrasound   90154  Iontophoresis    37574               Recommendations: Continue as planned  Timeframe: 1 month  Prognosis to achieve goals: good      Timed:         Manual Therapy:         mins  97246;     Therapeutic Exercise:    33     mins  46632;     Neuromuscular Jazmin:    14    mins  69970;    Therapeutic Activity:          mins  94011;     Gait Training:           mins  77426;     Ultrasound:          mins  44488;    Ionto                                   mins   96919  Self Care                            mins   08352    Un-Timed:  Electrical Stimulation:         mins  71547 (MC );  Dry Needling          mins self-pay  Traction          mins 98180  Re-Eval                               mins  00424  Canalith Repos         mins 78596    Timed Treatment:   47   mins   Total Treatment:     58   mins(5 min mh and 6 min ice)          PT: Win Rodriguez PT     [place] License:  [id number]    Electronically signed by Win Rodriguez PT, 03/28/24, 11:06 AM EDT    Certification Period: 3/28/2024 thru 6/25/2024  I certify that the therapy services are furnished while this patient is under my care.  The services outlined above are required by this patient, and will be reviewed every 90 days.         Physician Signature:__________________________________________________    PHYSICIAN: Raul Eagle MD  NPI: [id number]                                      DATE:  :     Please sign and return via fax to [email or web address] . Thank you, [facility]

## 2024-04-01 ENCOUNTER — TREATMENT (OUTPATIENT)
Dept: PHYSICAL THERAPY | Facility: CLINIC | Age: 54
End: 2024-04-01
Payer: COMMERCIAL

## 2024-04-01 DIAGNOSIS — G89.29 CHRONIC PAIN OF LEFT ANKLE: Primary | ICD-10-CM

## 2024-04-01 DIAGNOSIS — S86.312D PERONEAL TENDON TEAR, LEFT, SUBSEQUENT ENCOUNTER: ICD-10-CM

## 2024-04-01 DIAGNOSIS — M25.572 CHRONIC PAIN OF LEFT ANKLE: Primary | ICD-10-CM

## 2024-04-01 DIAGNOSIS — M25.672 DECREASED RANGE OF MOTION OF LEFT ANKLE: ICD-10-CM

## 2024-04-01 DIAGNOSIS — R26.89 ANTALGIC GAIT: ICD-10-CM

## 2024-04-01 NOTE — PROGRESS NOTES
"Physical Therapy Daily Treatment Note      Patient: Jaquan Mckay   : 1970  Referring practitioner: Raul Eagle MD  Date of Initial Visit: Type: THERAPY  Noted: 2024  Today's Date: 2024  Patient seen for 14 sessions       Visit Diagnoses:    ICD-10-CM ICD-9-CM   1. Chronic pain of left ankle  M25.572 719.47    G89.29 338.29   2. Peroneal tendon tear, left, subsequent encounter  S86.312D V58.89     844.8   3. Decreased range of motion of left ankle  M25.672 719.57   4. Antalgic gait  R26.89 781.2       Subjective Evaluation    History of Present Illness    Subjective comment: Patient reports 7/10 ankle pain today.  He mentions that his ankle \"popped\" very loudly earlier.Pain  Current pain ratin           Objective   See Exercise, Manual, and Modality Logs for complete treatment.       Assessment & Plan       Assessment  Assessment details: Patient tolerated today's session well, with rest breaks provided as necessary.  Therapy session consisted of there ex and neuromuscular re-education.  Exercises focused on ankle ROM, stretching, and stabilization.  Patient performed tandem and SLS activities in parallel bars, with patient requiring intermittent UE support to correct postural sway.  Session concluded with cryotherapy, with no skin irritation following.  He will continue to be progressed per his tolerance and POC.          Timed:         Manual Therapy:         mins  79457;     Therapeutic Exercise:    25     mins  54752;     Neuromuscular Jazmin:    20    mins  94041;    Therapeutic Activity:          mins  87503;     Gait Training:           mins  69415;     Ultrasound:          mins  77229;    Ionto                                   mins   02040  Self Care                            mins   48193      Un-Timed:  Electrical Stimulation:         mins  07703 ( );  Dry Needling          mins self-pay  Traction          mins 61204  Canalith Repos         mins 68197    Timed Treatment:   45  " mins   Total Treatment:     51   mins    Hanna Burks, PT  [place] License: [id number]  Electronically signed by Hanna Burks PT, 04/01/24, 2:34 PM EDT.

## 2024-04-03 ENCOUNTER — TREATMENT (OUTPATIENT)
Dept: PHYSICAL THERAPY | Facility: CLINIC | Age: 54
End: 2024-04-03
Payer: COMMERCIAL

## 2024-04-03 DIAGNOSIS — M25.672 DECREASED RANGE OF MOTION OF LEFT ANKLE: ICD-10-CM

## 2024-04-03 DIAGNOSIS — S86.312D PERONEAL TENDON TEAR, LEFT, SUBSEQUENT ENCOUNTER: ICD-10-CM

## 2024-04-03 DIAGNOSIS — G89.29 CHRONIC PAIN OF LEFT ANKLE: Primary | ICD-10-CM

## 2024-04-03 DIAGNOSIS — M25.572 CHRONIC PAIN OF LEFT ANKLE: Primary | ICD-10-CM

## 2024-04-03 DIAGNOSIS — R26.89 ANTALGIC GAIT: ICD-10-CM

## 2024-04-03 NOTE — PROGRESS NOTES
"Physical Therapy Daily Treatment Note      Patient: Jaquan Mckay   : 1970  Referring practitioner: No ref. provider found  Date of Initial Visit: Type: THERAPY  Noted: 2024  Today's Date: 4/3/2024  Patient seen for 15 sessions       Visit Diagnoses:    ICD-10-CM ICD-9-CM   1. Chronic pain of left ankle  M25.572 719.47    G89.29 338.29   2. Peroneal tendon tear, left, subsequent encounter  S86.312D V58.89     844.8   3. Decreased range of motion of left ankle  M25.672 719.57   4. Antalgic gait  R26.89 781.2       Subjective Evaluation    History of Present Illness    Subjective comment: Patient reports that he \"needs heat today\" on his ankle.Pain  Current pain ratin           Objective   See Exercise, Manual, and Modality Logs for complete treatment.       Assessment & Plan       Assessment  Assessment details: Therapy session initiated with , with no adverse reactions following.  There ex and neuromuscular re-education performed, per flow sheet.  There ex focused on ankle ROM, stretching, and LE strengthening.  Patient was noted to have some difficulty with SLS activities.  Patient will continue to be progressed per his tolerance and POC.          Timed:         Manual Therapy:         mins  40080;     Therapeutic Exercise:    17     mins  39820;     Neuromuscular Jazmin:    10    mins  55237;    Therapeutic Activity:          mins  10522;     Gait Training:           mins  99130;     Ultrasound:          mins  97115;    Ionto                                   mins   14598  Self Care                            mins   23940      Un-Timed:  Electrical Stimulation:         mins  24406 ( );  Dry Needling          mins self-pay  Traction          mins 50252  Canalith Repos         mins 42889  : 5 min    Timed Treatment:   27   mins   Total Treatment:     32   mins    Hanna Burks PT  [place] License: [id number]  Electronically signed by Hanna Burks PT, 24, 4:33 PM " EDT.

## 2024-04-08 ENCOUNTER — TREATMENT (OUTPATIENT)
Dept: PHYSICAL THERAPY | Facility: CLINIC | Age: 54
End: 2024-04-08
Payer: COMMERCIAL

## 2024-04-08 DIAGNOSIS — M25.672 DECREASED RANGE OF MOTION OF LEFT ANKLE: ICD-10-CM

## 2024-04-08 DIAGNOSIS — S86.312D PERONEAL TENDON TEAR, LEFT, SUBSEQUENT ENCOUNTER: ICD-10-CM

## 2024-04-08 DIAGNOSIS — M25.572 CHRONIC PAIN OF LEFT ANKLE: Primary | ICD-10-CM

## 2024-04-08 DIAGNOSIS — R26.89 ANTALGIC GAIT: ICD-10-CM

## 2024-04-08 DIAGNOSIS — G89.29 CHRONIC PAIN OF LEFT ANKLE: Primary | ICD-10-CM

## 2024-04-08 PROCEDURE — 97112 NEUROMUSCULAR REEDUCATION: CPT | Performed by: PHYSICAL THERAPIST

## 2024-04-08 PROCEDURE — 97110 THERAPEUTIC EXERCISES: CPT | Performed by: PHYSICAL THERAPIST

## 2024-04-08 NOTE — PROGRESS NOTES
Physical Therapy Daily Treatment Note      Patient: Jaquan Mckay   : 1970  Referring practitioner: Raul Eagle MD  Date of Initial Visit: Type: THERAPY  Noted: 2024  Today's Date: 2024  Patient seen for 16 sessions       Visit Diagnoses:    ICD-10-CM ICD-9-CM   1. Chronic pain of left ankle  M25.572 719.47    G89.29 338.29   2. Peroneal tendon tear, left, subsequent encounter  S86.312D V58.89     844.8   3. Decreased range of motion of left ankle  M25.672 719.57   4. Antalgic gait  R26.89 781.2       Subjective Evaluation    History of Present Illness    Subjective comment: Pt has 7/10 pain today.  Pt has requested to be discharged next session.       Objective   See Exercise, Manual, and Modality Logs for complete treatment.       Assessment & Plan       Assessment  Assessment details: Tx today consisted of mh to ankle; followed by manual stretching of ankle, exercises for improved ankle stability and improved proprioception and ended with ice for decreased pain and inflammation.  Pt responded well to tx with 5/10 post pain.    Plan  Plan details: Will follow progressing toward discharge next session.          Timed:         Manual Therapy:         mins  44854;     Therapeutic Exercise:    18     mins  59701;     Neuromuscular Jazmin:    14    mins  68018;    Therapeutic Activity:          mins  31518;     Gait Training:           mins  45715;     Ultrasound:          mins  34443;    Ionto                                   mins   63861  Self Care                            mins   30027  Canalith Repos         mins 20377      Un-Timed:  Electrical Stimulation:         mins  95328 (MC );  Dry Needling          mins self-pay  Traction          mins 78841      Timed Treatment:   32   mins   Total Treatment:     43   mins(5 min mh and 6 min ice)    Win Rodriguez PT  [place] License: [id number]      Electronically signed by Win Rodriguez PT, 24, 12:59 PM EDT

## 2024-04-09 ENCOUNTER — OFFICE VISIT (OUTPATIENT)
Dept: FAMILY MEDICINE CLINIC | Facility: CLINIC | Age: 54
End: 2024-04-09
Payer: COMMERCIAL

## 2024-04-09 VITALS
BODY MASS INDEX: 36.1 KG/M2 | SYSTOLIC BLOOD PRESSURE: 138 MMHG | RESPIRATION RATE: 16 BRPM | HEIGHT: 67 IN | HEART RATE: 80 BPM | OXYGEN SATURATION: 98 % | DIASTOLIC BLOOD PRESSURE: 84 MMHG | WEIGHT: 230 LBS

## 2024-04-09 DIAGNOSIS — F51.04 PSYCHOPHYSIOLOGICAL INSOMNIA: ICD-10-CM

## 2024-04-09 DIAGNOSIS — F41.9 ANXIETY AND DEPRESSION: ICD-10-CM

## 2024-04-09 DIAGNOSIS — R30.0 DYSURIA: ICD-10-CM

## 2024-04-09 DIAGNOSIS — Z87.442 HISTORY OF RENAL STONE: ICD-10-CM

## 2024-04-09 DIAGNOSIS — M79.10 MYALGIA: Primary | ICD-10-CM

## 2024-04-09 DIAGNOSIS — Z87.892 PERSONAL HISTORY OF ANAPHYLAXIS: ICD-10-CM

## 2024-04-09 DIAGNOSIS — F32.A ANXIETY AND DEPRESSION: ICD-10-CM

## 2024-04-09 DIAGNOSIS — R05.1 ACUTE COUGH: ICD-10-CM

## 2024-04-09 RX ORDER — TEMAZEPAM 15 MG/1
15 CAPSULE ORAL NIGHTLY PRN
Qty: 30 CAPSULE | Refills: 0 | Status: SHIPPED | OUTPATIENT
Start: 2024-04-09

## 2024-04-09 RX ORDER — EPINEPHRINE 0.3 MG/.3ML
0.3 INJECTION SUBCUTANEOUS ONCE
Qty: 1 EACH | Refills: 0 | Status: SHIPPED | OUTPATIENT
Start: 2024-04-09 | End: 2024-04-09

## 2024-04-09 RX ORDER — GUAIFENESIN 600 MG/1
1200 TABLET, EXTENDED RELEASE ORAL 2 TIMES DAILY
Qty: 20 TABLET | Refills: 0 | Status: SHIPPED | OUTPATIENT
Start: 2024-04-09

## 2024-04-09 NOTE — PROGRESS NOTES
"Subjective   Jaquan Mckay is a 53 y.o. male.     Chief Complaint   Patient presents with    FOOT PAIN       History of Present Illness     Foot Pain-ongoing.  Under care of podiatry and PT.  He reports PT feels he is improving but his pain continues to be \"bad\".  He will follow up with Dr Florez due to the popping and ongoing pain.  He has noted some \"knot\" on his lateral ankle near his surgery site.  He is wearing his brace.  He is interested in a RX for capsiacin.  He has used biofreeze but it did not help.     Incontinence supplies-reports he is having \"accidents\"  of urine and bowel incontinence.  He has reached out to a company for supplies.  There is suppose to be paperwork to sign.    Dysuria-reports he is burning with urination, pressure over bladder, and mid back pain.  He is concerned he could be passing a stone.  His urine is dark.  He has noted some skin irritation in his bhaskar area.    Hypertension-chronic and ongoing.  Patient is currently taking lisinopril 30 mg.  No negative side effects.  He denies chest pain.  Reports \"not had any in the head while\".  No concerns for palpitations.  He does have occasional shortness of breath but he also has history of asthma.   Insomnia-reports that anxiety is \"thru the roof\".  He does not feel he is sleeping any better with Restoril 15 mg use.  He continues remeron.  He has failed multiple medications due to rashes and itching.    Allergies-needs an updated epi-pen due tho his allergy injections.      The following portions of the patient's history were reviewed and updated as appropriate: CC, ROS, allergies, current medications, past family history, past medical history, past social history, past surgical history and problem list.      Review of Systems   Constitutional:  Positive for fatigue. Negative for appetite change, unexpected weight gain and unexpected weight loss.   HENT:  Negative for congestion, ear pain, postnasal drip, rhinorrhea, sore throat, swollen " "glands, trouble swallowing and voice change.    Eyes:  Negative for pain and visual disturbance.   Respiratory:  Negative for cough, chest tightness, shortness of breath and wheezing.    Cardiovascular:  Negative for chest pain, palpitations and leg swelling.   Gastrointestinal:  Negative for abdominal pain, blood in stool, constipation, diarrhea, nausea and indigestion.   Genitourinary:  Negative for dysuria, hematuria and urgency.   Musculoskeletal:  Positive for arthralgias, back pain, gait problem and myalgias. Negative for joint swelling.   Skin:  Negative for color change and skin lesions.   Allergic/Immunologic: Negative.    Neurological:  Negative for dizziness, numbness and headache.   Hematological: Negative.    Psychiatric/Behavioral:  Positive for stress. Negative for dysphoric mood, sleep disturbance and suicidal ideas. The patient is not nervous/anxious.    All other systems reviewed and are negative.      Objective     /84   Pulse 80   Resp 16   Ht 170.2 cm (67.01\")   Wt 104 kg (230 lb)   SpO2 98%   BMI 36.01 kg/m²     Physical Exam  Vitals reviewed.   Constitutional:       General: He is not in acute distress.     Appearance: He is well-developed. He is obese. He is not diaphoretic.      Comments: Fatigued appearing     HENT:      Head: Normocephalic and atraumatic.      Jaw: No tenderness.      Right Ear: Hearing, tympanic membrane, ear canal and external ear normal.      Left Ear: Hearing, tympanic membrane, ear canal and external ear normal.      Nose: Nose normal. Rhinorrhea present. No nasal tenderness or congestion.      Right Sinus: No maxillary sinus tenderness or frontal sinus tenderness.      Left Sinus: No maxillary sinus tenderness or frontal sinus tenderness.      Mouth/Throat:      Lips: Pink.      Mouth: Mucous membranes are moist.      Pharynx: Oropharynx is clear. Uvula midline. Posterior oropharyngeal erythema (thin pnd noted with mild irritation) present.   Eyes:      " General: Lids are normal. No scleral icterus.     Extraocular Movements:      Right eye: Normal extraocular motion and no nystagmus.      Left eye: Normal extraocular motion and no nystagmus.      Conjunctiva/sclera: Conjunctivae normal.      Pupils: Pupils are equal, round, and reactive to light.   Neck:      Thyroid: No thyromegaly or thyroid tenderness.      Vascular: No carotid bruit or JVD.      Trachea: No tracheal tenderness.   Cardiovascular:      Rate and Rhythm: Normal rate and regular rhythm.      Pulses:           Dorsalis pedis pulses are 2+ on the right side and 2+ on the left side.        Posterior tibial pulses are 2+ on the right side and 2+ on the left side.      Heart sounds: Normal heart sounds, S1 normal and S2 normal. No murmur heard.  Pulmonary:      Effort: Pulmonary effort is normal. No accessory muscle usage, prolonged expiration or respiratory distress.      Breath sounds: Normal breath sounds.      Comments: Occasionally coughing during exam  Chest:      Chest wall: No tenderness.   Abdominal:      General: Bowel sounds are normal. There is no distension.      Palpations: Abdomen is soft. There is no hepatomegaly, splenomegaly or mass.      Tenderness: There is no abdominal tenderness. There is no right CVA tenderness or left CVA tenderness.      Hernia: No hernia is present.   Musculoskeletal:         General: Tenderness present.      Cervical back: Normal range of motion and neck supple.      Thoracic back: Tenderness present.      Lumbar back: Tenderness (generalized) present.      Right lower leg: No edema.      Left lower leg: No edema.        Feet:       Comments: No muscular atrophy or flaccidity.  Ankle brace in place on left ankle/foot     Lymphadenopathy:      Head:      Right side of head: No submental or submandibular adenopathy.      Left side of head: No submental or submandibular adenopathy.      Cervical: No cervical adenopathy.      Right cervical: No superficial cervical  adenopathy.     Left cervical: No superficial cervical adenopathy.   Skin:     General: Skin is warm and dry.      Capillary Refill: Capillary refill takes less than 2 seconds.      Coloration: Skin is not jaundiced or pale.      Findings: No erythema.      Nails: There is no clubbing.   Neurological:      Mental Status: He is alert and oriented to person, place, and time.      Cranial Nerves: No cranial nerve deficit or facial asymmetry.      Sensory: No sensory deficit.      Motor: No weakness, tremor, atrophy or abnormal muscle tone.      Coordination: Coordination normal.      Gait: Gait abnormal (moderate antalgia).      Deep Tendon Reflexes: Reflexes are normal and symmetric.   Psychiatric:         Attention and Perception: He is attentive.         Mood and Affect: Mood is not anxious or depressed.         Speech: Speech normal.         Behavior: Behavior normal. Behavior is cooperative.         Thought Content: Thought content normal.         Cognition and Memory: Cognition normal.         Judgment: Judgment normal.         Diagnoses and all orders for this visit:    1. Myalgia (Primary)  -     Capsaicin 0.025 % lotion; Apply 1 Application topically 2 (Two) Times a Day.  Dispense: 113.2 mL; Refill: 5    2. Dysuria  -     POC Urinalysis Dipstick, Automated  -     XR Abdomen KUB    3. History of renal stone  Comments:  kub due to dysuria  Orders:  -     XR Abdomen KUB    4. Acute cough  -     guaiFENesin (Mucinex) 600 MG 12 hr tablet; Take 2 tablets by mouth 2 (Two) Times a Day.  Dispense: 20 tablet; Refill: 0    5. Psychophysiological insomnia  Comments:  continue remeron and restoril  Overview:  With depression and anxiety      Orders:  -     temazepam (Restoril) 15 MG capsule; Take 1 capsule by mouth At Night As Needed for Sleep.  Dispense: 30 capsule; Refill: 0    6. Anxiety and depression  Comments:  trial of trintellex  Orders:  -     Vortioxetine HBr (Trintellix) 5 MG tablet tablet; Take 1 tablet by  mouth Daily With Breakfast.  Dispense: 35 tablet; Refill: 0    7. Personal history of anaphylaxis  -     EPINEPHrine (EpiPen 2-Clark) 0.3 MG/0.3ML solution auto-injector injection; Inject 0.3 mL under the skin into the appropriate area as directed 1 (One) Time for 1 dose.  Dispense: 1 each; Refill: 0       Understands disease processes and need for medications.  Understands reasons for urgent and emergent care.  Patient (& family) verbalized agreement for treatment plan.   Emotional support and active listening provided.  Patient provided time to verbalize feelings.    CHAVEZ/LINN reviewed today and consistent.  Will refill prescribed controlled medication today.  Patient is aware they cannot receive narcotics from any other provider except if under care of pain management or speciality clinic.  Risk and benefits of medication use has been reviewed.  History and physical exam exhibit continued safe and appropriate use of controlled substances.  The patient is aware of the potential for addiction and dependence.  This patient has been made aware of the appropriate use of such medications, including potential risk of somnolence, limited ability to drive and / or work safely, and potential for overdose.    It has also been made clear that these medications are for use by this patient only, without concomitant use of alcohol or other substances unless prescribed/advised by medical provider.  Patient understands they may be subject to UDS and pill counts at random.    Patient considered to be low risk for addiction due to use of single controlled medications.  Patient understands and accepts these risks.  Patient need for medication will be reassessed at each visit.  Doses will be adjusted according to patient need and findings.    Goal of TX: Patient will not have any adverse reactions of medication.  Patient will have improvement in sleep hygiene/pattern with use of Restoril as needed as directed.    CHAVEZ Patient  Controlled Substance Report (from 4/11/2023 to 4/9/2024)    Dispensed  Strength Quantity Days Supply Provider Pharmacy   03/12/2024 Temazepam 15MG 30 each 30 BHUPINDER,LENNOX [facility]   01/29/2024 Diazepam 10MG 1 each 1 BHUPINDER,LENNOX [facility]   11/13/2023 Hydrocodone/Acetaminophen 325MG/7.5MG 12 each 4 LENNOX [facility]        RTC 1 month, sooner if needed.           This document has been electronically signed by:  BALDOMERO Thao, YESY-C    Dragon disclaimer:  Part of this note may be an electronic transcription/translation of spoken language to printed text using the Dragon Dictation System.

## 2024-04-10 PROBLEM — N39.46 MIXED STRESS AND URGE URINARY INCONTINENCE: Status: ACTIVE | Noted: 2024-04-10

## 2024-04-11 ENCOUNTER — TREATMENT (OUTPATIENT)
Dept: PHYSICAL THERAPY | Facility: CLINIC | Age: 54
End: 2024-04-11
Payer: COMMERCIAL

## 2024-04-11 DIAGNOSIS — M25.572 CHRONIC PAIN OF LEFT ANKLE: Primary | ICD-10-CM

## 2024-04-11 DIAGNOSIS — R26.89 ANTALGIC GAIT: ICD-10-CM

## 2024-04-11 DIAGNOSIS — S86.312D PERONEAL TENDON TEAR, LEFT, SUBSEQUENT ENCOUNTER: ICD-10-CM

## 2024-04-11 DIAGNOSIS — G89.29 CHRONIC PAIN OF LEFT ANKLE: Primary | ICD-10-CM

## 2024-04-11 DIAGNOSIS — M25.672 DECREASED RANGE OF MOTION OF LEFT ANKLE: ICD-10-CM

## 2024-04-11 NOTE — PROGRESS NOTES
Physical Therapy Daily Treatment Note      Patient: Jaquan Mckay   : 1970  Referring practitioner: Raul Eagle MD  Date of Initial Visit: Type: THERAPY  Noted: 2024  Today's Date: 2024  Patient seen for 17 sessions       Visit Diagnoses:    ICD-10-CM ICD-9-CM   1. Chronic pain of left ankle  M25.572 719.47    G89.29 338.29   2. Peroneal tendon tear, left, subsequent encounter  S86.312D V58.89     844.8   3. Decreased range of motion of left ankle  M25.672 719.57   4. Antalgic gait  R26.89 781.2       Subjective Evaluation    History of Present Illness    Subjective comment: Pt has 8/10.     Objective   See Exercise, Manual, and Modality Logs for complete treatment.       Assessment & Plan       Assessment  Assessment details: Tx today consisted of mh to ankle; followed by manual stretching of ankle, exercises for improved ankle stability and improved proprioception and ended with ice for decreased pain and inflammation.  Pt instructed in a HEP and will be discharged at this time. Pt responded well to tx with 6/10 post pain.    Plan  Plan details: Will follow progressing toward discharge next session.          Timed:         Manual Therapy:         mins  25942;     Therapeutic Exercise:    27     mins  53165;     Neuromuscular Jazmin:    14    mins  17564;    Therapeutic Activity:          mins  25435;     Gait Training:           mins  44533;     Ultrasound:          mins  15112;    Ionto                                   mins   89845  Self Care                            mins   39458  Canalith Repos         mins [id number]      Un-Timed:  Electrical Stimulation:         mins  69254 (MC );  Dry Needling          mins self-pay  Traction          mins 83875      Timed Treatment:   41   mins   Total Treatment:     52   mins(5 min mh and 6 min ice)    Win Rodriguez PT  [place] License: [id number]      Electronically signed by Win Rodriguez PT, 24, 11:01 AM EDT

## 2024-04-12 ENCOUNTER — HOSPITAL ENCOUNTER (OUTPATIENT)
Dept: GENERAL RADIOLOGY | Facility: HOSPITAL | Age: 54
Discharge: HOME | End: 2024-04-12
Payer: COMMERCIAL

## 2024-04-12 PROCEDURE — 74018 RADEX ABDOMEN 1 VIEW: CPT

## 2024-04-15 ENCOUNTER — APPOINTMENT (OUTPATIENT)
Dept: GENERAL RADIOLOGY | Facility: HOSPITAL | Age: 54
End: 2024-04-15
Payer: COMMERCIAL

## 2024-04-15 ENCOUNTER — HOSPITAL ENCOUNTER (EMERGENCY)
Facility: HOSPITAL | Age: 54
Discharge: HOME | End: 2024-04-16
Attending: EMERGENCY MEDICINE | Admitting: EMERGENCY MEDICINE
Payer: COMMERCIAL

## 2024-04-15 ENCOUNTER — APPOINTMENT (OUTPATIENT)
Dept: CT IMAGING | Facility: HOSPITAL | Age: 54
End: 2024-04-15
Payer: COMMERCIAL

## 2024-04-15 DIAGNOSIS — R07.9 CHEST PAIN, UNSPECIFIED TYPE: Primary | ICD-10-CM

## 2024-04-15 LAB
ALBUMIN SERPL-MCNC: 4.3 G/DL (ref 3.5–5.2)
ALBUMIN/GLOB SERPL: 1.3 G/DL
ALP SERPL-CCNC: 112 U/L (ref 39–117)
ALT SERPL W P-5'-P-CCNC: 21 U/L (ref 1–41)
ANION GAP SERPL CALCULATED.3IONS-SCNC: 14.2 MMOL/L (ref 5–15)
AST SERPL-CCNC: 22 U/L (ref 1–40)
BASOPHILS # BLD AUTO: 0.03 10*3/MM3 (ref 0–0.2)
BASOPHILS NFR BLD AUTO: 0.4 % (ref 0–1.5)
BILIRUB SERPL-MCNC: 0.3 MG/DL (ref 0–1.2)
BUN SERPL-MCNC: 16 MG/DL (ref 6–20)
BUN/CREAT SERPL: 15.5 (ref 7–25)
CALCIUM SPEC-SCNC: 9.5 MG/DL (ref 8.6–10.5)
CHLORIDE SERPL-SCNC: 102 MMOL/L (ref 98–107)
CO2 SERPL-SCNC: 21.8 MMOL/L (ref 22–29)
CREAT SERPL-MCNC: 1.03 MG/DL (ref 0.76–1.27)
D DIMER PPP FEU-MCNC: <0.27 MCGFEU/ML (ref 0–0.51)
DEPRECATED RDW RBC AUTO: 42 FL (ref 37–54)
EGFRCR SERPLBLD CKD-EPI 2021: 87.9 ML/MIN/1.73
EOSINOPHIL # BLD AUTO: 0.03 10*3/MM3 (ref 0–0.4)
EOSINOPHIL NFR BLD AUTO: 0.4 % (ref 0.3–6.2)
ERYTHROCYTE [DISTWIDTH] IN BLOOD BY AUTOMATED COUNT: 12.3 % (ref 12.3–15.4)
GEN 5 2HR TROPONIN T REFLEX: 7 NG/L
GLOBULIN UR ELPH-MCNC: 3.3 GM/DL
GLUCOSE SERPL-MCNC: 95 MG/DL (ref 65–99)
HCT VFR BLD AUTO: 45.1 % (ref 37.5–51)
HGB BLD-MCNC: 16.1 G/DL (ref 13–17.7)
HOLD SPECIMEN: NORMAL
HOLD SPECIMEN: NORMAL
IMM GRANULOCYTES # BLD AUTO: 0.02 10*3/MM3 (ref 0–0.05)
IMM GRANULOCYTES NFR BLD AUTO: 0.2 % (ref 0–0.5)
LYMPHOCYTES # BLD AUTO: 2.01 10*3/MM3 (ref 0.7–3.1)
LYMPHOCYTES NFR BLD AUTO: 24.3 % (ref 19.6–45.3)
MCH RBC QN AUTO: 32.9 PG (ref 26.6–33)
MCHC RBC AUTO-ENTMCNC: 35.7 G/DL (ref 31.5–35.7)
MCV RBC AUTO: 92 FL (ref 79–97)
MONOCYTES # BLD AUTO: 0.99 10*3/MM3 (ref 0.1–0.9)
MONOCYTES NFR BLD AUTO: 12 % (ref 5–12)
NEUTROPHILS NFR BLD AUTO: 5.2 10*3/MM3 (ref 1.7–7)
NEUTROPHILS NFR BLD AUTO: 62.7 % (ref 42.7–76)
NRBC BLD AUTO-RTO: 0 /100 WBC (ref 0–0.2)
PLATELET # BLD AUTO: 183 10*3/MM3 (ref 140–450)
PMV BLD AUTO: 9.6 FL (ref 6–12)
POTASSIUM SERPL-SCNC: 3.8 MMOL/L (ref 3.5–5.2)
PROT SERPL-MCNC: 7.6 G/DL (ref 6–8.5)
RBC # BLD AUTO: 4.9 10*6/MM3 (ref 4.14–5.8)
SODIUM SERPL-SCNC: 138 MMOL/L (ref 136–145)
TROPONIN T DELTA: 0 NG/L
TROPONIN T SERPL HS-MCNC: 7 NG/L
WBC NRBC COR # BLD AUTO: 8.28 10*3/MM3 (ref 3.4–10.8)
WHOLE BLOOD HOLD COAG: NORMAL
WHOLE BLOOD HOLD SPECIMEN: NORMAL

## 2024-04-15 PROCEDURE — 71045 X-RAY EXAM CHEST 1 VIEW: CPT

## 2024-04-15 PROCEDURE — 99284 EMERGENCY DEPT VISIT MOD MDM: CPT

## 2024-04-15 PROCEDURE — 36415 COLL VENOUS BLD VENIPUNCTURE: CPT | Performed by: STUDENT IN AN ORGANIZED HEALTH CARE EDUCATION/TRAINING PROGRAM

## 2024-04-15 PROCEDURE — 80053 COMPREHEN METABOLIC PANEL: CPT | Performed by: STUDENT IN AN ORGANIZED HEALTH CARE EDUCATION/TRAINING PROGRAM

## 2024-04-15 PROCEDURE — 85379 FIBRIN DEGRADATION QUANT: CPT | Performed by: EMERGENCY MEDICINE

## 2024-04-15 PROCEDURE — 85025 COMPLETE CBC W/AUTO DIFF WBC: CPT | Performed by: STUDENT IN AN ORGANIZED HEALTH CARE EDUCATION/TRAINING PROGRAM

## 2024-04-15 PROCEDURE — 71045 X-RAY EXAM CHEST 1 VIEW: CPT | Performed by: RADIOLOGY

## 2024-04-15 PROCEDURE — 71250 CT THORAX DX C-: CPT | Performed by: RADIOLOGY

## 2024-04-15 PROCEDURE — 84484 ASSAY OF TROPONIN QUANT: CPT | Performed by: EMERGENCY MEDICINE

## 2024-04-15 PROCEDURE — 71250 CT THORAX DX C-: CPT

## 2024-04-15 PROCEDURE — 93010 ELECTROCARDIOGRAM REPORT: CPT | Performed by: INTERNAL MEDICINE

## 2024-04-15 PROCEDURE — 84484 ASSAY OF TROPONIN QUANT: CPT | Performed by: STUDENT IN AN ORGANIZED HEALTH CARE EDUCATION/TRAINING PROGRAM

## 2024-04-15 PROCEDURE — 93005 ELECTROCARDIOGRAM TRACING: CPT | Performed by: EMERGENCY MEDICINE

## 2024-04-15 PROCEDURE — 93005 ELECTROCARDIOGRAM TRACING: CPT | Performed by: STUDENT IN AN ORGANIZED HEALTH CARE EDUCATION/TRAINING PROGRAM

## 2024-04-15 RX ORDER — ALUMINA, MAGNESIA, AND SIMETHICONE 2400; 2400; 240 MG/30ML; MG/30ML; MG/30ML
15 SUSPENSION ORAL ONCE
Status: COMPLETED | OUTPATIENT
Start: 2024-04-15 | End: 2024-04-15

## 2024-04-15 RX ORDER — PHENOBARBITAL, HYOSCYAMINE SULFATE, ATROPINE SULFATE AND SCOPOLAMINE HYDROBROMIDE .0194; .1037; 16.2; .0065 MG/1; MG/1; MG/1; MG/1
2 TABLET ORAL ONCE
Status: COMPLETED | OUTPATIENT
Start: 2024-04-15 | End: 2024-04-15

## 2024-04-15 RX ORDER — DIPHENHYDRAMINE HYDROCHLORIDE 50 MG/ML
50 INJECTION INTRAMUSCULAR; INTRAVENOUS ONCE
Status: DISCONTINUED | OUTPATIENT
Start: 2024-04-15 | End: 2024-04-15

## 2024-04-15 RX ORDER — LIDOCAINE HYDROCHLORIDE 20 MG/ML
15 SOLUTION OROPHARYNGEAL ONCE
Status: COMPLETED | OUTPATIENT
Start: 2024-04-15 | End: 2024-04-15

## 2024-04-15 RX ORDER — ASPIRIN 81 MG/1
324 TABLET, CHEWABLE ORAL ONCE
Status: DISCONTINUED | OUTPATIENT
Start: 2024-04-15 | End: 2024-04-16 | Stop reason: HOSPADM

## 2024-04-15 RX ORDER — SODIUM CHLORIDE 0.9 % (FLUSH) 0.9 %
10 SYRINGE (ML) INJECTION AS NEEDED
Status: DISCONTINUED | OUTPATIENT
Start: 2024-04-15 | End: 2024-04-16 | Stop reason: HOSPADM

## 2024-04-15 RX ORDER — FAMOTIDINE 10 MG/ML
40 INJECTION, SOLUTION INTRAVENOUS ONCE
Status: DISCONTINUED | OUTPATIENT
Start: 2024-04-15 | End: 2024-04-15

## 2024-04-15 RX ADMIN — ALUMINUM HYDROXIDE, MAGNESIUM HYDROXIDE, AND DIMETHICONE 15 ML: 400; 400; 40 SUSPENSION ORAL at 21:21

## 2024-04-15 RX ADMIN — PHENOBARBITAL, HYOSCYAMINE SULFATE, ATROPINE SULFATE, SCOPOLAMINE HYDROBROMIDE 32.4 MG: 16.2; .1037; .0194; .0065 TABLET ORAL at 21:21

## 2024-04-15 RX ADMIN — LIDOCAINE HYDROCHLORIDE 15 ML: 20 SOLUTION ORAL at 21:21

## 2024-04-15 NOTE — ED NOTES
MEDICAL SCREENING:    Reason for Visit: Chest pain    Patient initially seen in triage.  The patient was advised further evaluation and diagnostic testing will be needed, some of the treatment and testing will be initiated in the lobby in order to begin the process.  The patient will be returned to the waiting area for the time being and possibly be re-assessed by a subsequent ED provider.  The patient will be brought back to the treatment area in as timely manner as possible.       Almas Mills PA  04/15/24 163

## 2024-04-16 ENCOUNTER — TRANSCRIBE ORDERS (OUTPATIENT)
Dept: ADMINISTRATIVE | Facility: HOSPITAL | Age: 54
End: 2024-04-16
Payer: COMMERCIAL

## 2024-04-16 VITALS
HEIGHT: 67 IN | DIASTOLIC BLOOD PRESSURE: 88 MMHG | TEMPERATURE: 98.9 F | OXYGEN SATURATION: 99 % | BODY MASS INDEX: 35.99 KG/M2 | WEIGHT: 229.28 LBS | SYSTOLIC BLOOD PRESSURE: 130 MMHG | RESPIRATION RATE: 20 BRPM | HEART RATE: 82 BPM

## 2024-04-16 DIAGNOSIS — R07.9 CHEST PAIN, UNSPECIFIED TYPE: Primary | ICD-10-CM

## 2024-04-16 LAB
QT INTERVAL: 330 MS
QT INTERVAL: 350 MS
QT INTERVAL: 380 MS
QTC INTERVAL: 419 MS
QTC INTERVAL: 425 MS
QTC INTERVAL: 430 MS

## 2024-04-16 NOTE — ED PROVIDER NOTES
"Subjective   History of Present Illness  Patient is a 53-year-old male who presents with a chief complaint of chest pain that started at about noon today as he was walking through [employer], has been constant ever since.  This is pain in the center of his chest, initially described as sharp and stabbing, but then later in conversation described as like something heavy sitting on his chest.  This radiates through to his back.  He reports shortness of breath.  He denies nausea, vomiting, diaphoresis, syncope or near syncope, other symptoms or other complaints.  He does not recall injuring his chest in any way, states that he has done nothing that he is aware of to possibly strained it.  Patient denies any known history of coronary artery disease, has never had any sort of coronary artery interventions, but states that he did have \"2 left heart attacks\" several years ago.  He does have a history of obesity, hypertension and dyslipidemia.,  No history of diabetes.  He reports a history of a DVT in his right leg after he had knee surgery several years ago, took blood thinners for 6 months to a year and has had no other problems with blood clots.      Review of Systems   All other systems reviewed and are negative.      Past Medical History:   Diagnosis Date    Allergic     Anxiety     Arthritis     Asthma     Body piercing     REPORTS CYLICONE IN EARS    Clotting disorder 2004    had a knee surgery    Coronary artery disease     Depression     DVT (deep venous thrombosis)     RIGHT RIGHT KNEE AFTER SURGERY YEARS AGO IN 2001 OR 2004    Elevated cholesterol     Gastric ulcer     GERD (gastroesophageal reflux disease)     H/O migraine     Headache     Heart attack     REPORTS \"LIGHT HEART ATTACK A LONG TIME AGO\"  \"EARLY 90'S\"    History of seizures     REPORTS LAST EPISODE WAS AROUND 1995.    Hostility     Hyperlipidemia     Hypertension     Knee pain, acute     Left    Low back pain     Lyme disease     Migraine     MRSA " "(methicillin resistant Staphylococcus aureus)     REPORTS LAST TESTED + 2004. WAS TREATED HE REPORTS.  RIGHT ARM, RIGHT KNEE.    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     Obesity     Poor historian     [place] spotted fever     Seizures     Sleep apnea     Tattoo     Wears glasses        Allergies   Allergen Reactions    Ciprofloxacin Anaphylaxis and Hives    Miralax [Polyethylene Glycol] Itching and Rash    Mobic [Meloxicam] Other (See Comments)     Pt states, \"It make my feet and hands go numb and I can't hardly walk.\"     Paxil [Paroxetine Hcl] Shortness Of Breath     Chest pain     Peanut-Containing Drug Products Anaphylaxis    Penicillins Anaphylaxis    Pristiq [Desvenlafaxine Succinate Er] Dizziness    Sulfa Antibiotics Anaphylaxis, Itching and Rash    Doxycycline Hives    Fish-Derived Products Hives    Isosorbide Nitrate Rash     Rash, hives, had to use inhaler.     Movantik [Naloxegol] Rash    Seroquel [Quetiapine] Hives and Rash    Buspar [Buspirone] Rash    Clarithromycin Rash    Clindamycin/Lincomycin Rash    Codeine Rash    Contrast Dye (Echo Or Unknown Ct/Mr) Itching and Rash    Diltiazem Rash    Flomax [Tamsulosin] Hives    Gabapentin Rash    Iodinated Contrast Media Itching and Rash    Keflex [Cephalexin] Rash    Levocetirizine Rash    Linzess [Linaclotide] Rash    Metoprolol Rash    Prednisone Rash and Other (See Comments)     Face, feet, and legs go completely numb per patient    Robitussin Cough+ Chest Max St [Dextromethorphan-Guaifenesin] Itching    Shrimp (Diagnostic) Rash    Spironolactone Rash    Viibryd [Vilazodone Hcl] Itching and Rash    Zoloft [Sertraline Hcl] Hives and Itching       Past Surgical History:   Procedure Laterality Date    ABDOMINAL SURGERY      BACK SURGERY      BRAIN SURGERY  1986    Tumor removal     CARDIAC CATHETERIZATION N/A 9/28/2018    Procedure: Left Heart Cath;  Surgeon: Leandro Dialy MD;  Location: [facility] CATH INVASIVE LOCATION;  Service: Cardiology    CHOLECYSTECTOMY      " COLONOSCOPY      COLONOSCOPY N/A 8/2/2021    Procedure: COLONOSCOPY FOR SCREENING;  Surgeon: Irving Azar MD;  Location: [facility] OR;  Service: Gastroenterology;  Laterality: N/A;    CYST REMOVAL      pilonidal cyst    ELBOW EPICONDYLECTOMY Right 7/23/2020    Procedure: LATERAL EPICONDYLAR RELEASE;  Surgeon: Jose Ruiz MD;  Location: [facility] OR;  Service: Orthopedics;  Laterality: Right;    ENDOSCOPY      ENDOSCOPY N/A 8/2/2021    Procedure: ESOPHAGOGASTRODUODENOSCOPY WITH BIOPSY;  Surgeon: Irving Azar MD;  Location: [facility] OR;  Service: Gastroenterology;  Laterality: N/A;  esophageal dilatation to 20mm    FRACTURE SURGERY Right     elbow    KNEE ARTHROSCOPY Left 10/20/2017    Procedure: Diagnostic arthroscopy left knee with chondroplasty;  Surgeon: Marco Aguirre MD;  Location: [facility] OR;  Service:     KNEE ARTHROSCOPY Left 1/11/2021    Procedure: KNEE DIAGNOSTIC ARTHROSCOPY WITH  CHONDROPLASTY patella, femoral and medial;  Surgeon: Raul Eagle MD;  Location: [facility] OR;  Service: Orthopedics;  Laterality: Left;    KNEE SURGERY Right     MOUTH SURGERY      FULL MOUTH EXTRACTION    OTHER SURGICAL HISTORY      REPORTS 7 TICKS REMOVED FROM RIGHT ARM IN 2001 OR 2002    TENNIS ELBOW RELEASE Right 7/23/2020    Procedure: RIGHT TENNIS ELBOW RELEASE;  Surgeon: Jose Ruiz MD;  Location: [facility] OR;  Service: Orthopedics;  Laterality: Right;    TUMOR EXCISION      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       Family History   Problem Relation Age of Onset    Diabetes Mother     Hypertension Mother     Stroke Mother     Diabetes Father     Skin cancer Father     Hypertension Father     Heart attack Father     Diabetes Brother     Hypertension Brother     Heart disease Maternal Aunt     Heart disease Maternal Uncle     Heart disease Paternal Aunt     Heart disease Paternal Uncle     Heart disease Maternal Grandmother     Heart disease Maternal Grandfather      Heart disease Paternal Grandmother     Heart disease Paternal Grandfather        Social History     Socioeconomic History    Marital status:      Spouse name: Becca    Number of children: 2    Years of education: 12   Tobacco Use    Smoking status: Every Day     Current packs/day: 1.00     Average packs/day: 1 pack/day for 17.1 years (17.1 ttl pk-yrs)     Types: Cigars, Cigarettes     Start date: 4/11/2022     Passive exposure: Current    Smokeless tobacco: Never   Vaping Use    Vaping status: Never Used   Substance and Sexual Activity    Alcohol use: No    Drug use: No    Sexual activity: Defer     Partners: Female     Birth control/protection: None           Objective   Physical Exam  Vitals and nursing note reviewed.   Constitutional:       General: He is not in acute distress.     Appearance: Normal appearance. He is well-developed. He is obese. He is not ill-appearing, toxic-appearing or diaphoretic.      Comments: Well-developed well-nourished male, alert and well-oriented, in no apparent distress.  He appears calm and comfortable, watching television.  He does not appear to be ill.   HENT:      Head: Normocephalic and atraumatic.   Eyes:      General: No scleral icterus.     Pupils: Pupils are equal, round, and reactive to light.   Neck:      Trachea: No tracheal deviation.   Cardiovascular:      Rate and Rhythm: Normal rate and regular rhythm.   Pulmonary:      Effort: Pulmonary effort is normal. No respiratory distress.      Breath sounds: Normal breath sounds.   Chest:      Chest wall: No tenderness.   Abdominal:      General: Bowel sounds are normal.      Palpations: Abdomen is soft.      Tenderness: There is no abdominal tenderness. There is no guarding or rebound.   Musculoskeletal:         General: No tenderness. Normal range of motion.      Cervical back: Normal range of motion and neck supple. No rigidity or tenderness.      Right lower leg: No tenderness. No edema.      Left lower leg:  No tenderness. No edema.   Skin:     General: Skin is warm and dry.      Capillary Refill: Capillary refill takes less than 2 seconds.      Coloration: Skin is not pale.   Neurological:      General: No focal deficit present.      Mental Status: He is alert and oriented to person, place, and time.      GCS: GCS eye subscore is 4. GCS verbal subscore is 5. GCS motor subscore is 6.      Motor: No abnormal muscle tone.   Psychiatric:         Mood and Affect: Mood normal.         Behavior: Behavior normal.         Procedures  EKG at 1630 showed sinus rhythm, rate 97.  NJ interval 166, QRS duration 80, QTc 419 ms.  No apparent acute ischemia.  No evidence for STEMI.  Repeat EKG at 2023 shows sinus rhythm, rate 89.  NJ interval 174, QRS duration 86, QTc 425 ms.  No apparent acute ischemia.  No evidence for STEMI.  EKG at 2325 shows sinus rhythm, rate 77.  NJ interval 170, QRS duration 94, QTc 430 ms.  No apparent acute ischemia.  No evidence for STEMI.  CT Chest Without Contrast Diagnostic   Final Result       1.  No thoracic aortic aneurysm.   2.  Normal heart size.   3.  No mediastinal or hilar adenopathy.   4.  No lobar consolidation or edema.   5.  No pleural effusion or pneumothorax.           This report was finalized on 4/15/2024 9:50 PM by Marco Nieto MD.          XR Chest 1 View   Final Result   No acute cardiopulmonary process.           This report was finalized on 4/15/2024 5:39 PM by Alex Pallas, DO.            Results for orders placed or performed during the hospital encounter of 04/15/24   Comprehensive Metabolic Panel    Specimen: Blood   Result Value Ref Range    Glucose 95 65 - 99 mg/dL    BUN 16 6 - 20 mg/dL    Creatinine 1.03 0.76 - 1.27 mg/dL    Sodium 138 136 - 145 mmol/L    Potassium 3.8 3.5 - 5.2 mmol/L    Chloride 102 98 - 107 mmol/L    CO2 21.8 (L) 22.0 - 29.0 mmol/L    Calcium 9.5 8.6 - 10.5 mg/dL    Total Protein 7.6 6.0 - 8.5 g/dL    Albumin 4.3 3.5 - 5.2 g/dL    ALT (SGPT) 21 1 - 41 U/L     AST (SGOT) 22 1 - 40 U/L    Alkaline Phosphatase 112 39 - 117 U/L    Total Bilirubin 0.3 0.0 - 1.2 mg/dL    Globulin 3.3 gm/dL    A/G Ratio 1.3 g/dL    BUN/Creatinine Ratio 15.5 7.0 - 25.0    Anion Gap 14.2 5.0 - 15.0 mmol/L    eGFR 87.9 >60.0 mL/min/1.73   High Sensitivity Troponin T    Specimen: Blood   Result Value Ref Range    HS Troponin T 7 <22 ng/L   CBC Auto Differential    Specimen: Blood   Result Value Ref Range    WBC 8.28 3.40 - 10.80 10*3/mm3    RBC 4.90 4.14 - 5.80 10*6/mm3    Hemoglobin 16.1 13.0 - 17.7 g/dL    Hematocrit 45.1 37.5 - 51.0 %    MCV 92.0 79.0 - 97.0 fL    MCH 32.9 26.6 - 33.0 pg    MCHC 35.7 31.5 - 35.7 g/dL    RDW 12.3 12.3 - 15.4 %    RDW-SD 42.0 37.0 - 54.0 fl    MPV 9.6 6.0 - 12.0 fL    Platelets 183 140 - 450 10*3/mm3    Neutrophil % 62.7 42.7 - 76.0 %    Lymphocyte % 24.3 19.6 - 45.3 %    Monocyte % 12.0 5.0 - 12.0 %    Eosinophil % 0.4 0.3 - 6.2 %    Basophil % 0.4 0.0 - 1.5 %    Immature Grans % 0.2 0.0 - 0.5 %    Neutrophils, Absolute 5.20 1.70 - 7.00 10*3/mm3    Lymphocytes, Absolute 2.01 0.70 - 3.10 10*3/mm3    Monocytes, Absolute 0.99 (H) 0.10 - 0.90 10*3/mm3    Eosinophils, Absolute 0.03 0.00 - 0.40 10*3/mm3    Basophils, Absolute 0.03 0.00 - 0.20 10*3/mm3    Immature Grans, Absolute 0.02 0.00 - 0.05 10*3/mm3    nRBC 0.0 0.0 - 0.2 /100 WBC   High Sensitivity Troponin T 2Hr    Specimen: Hand, Left; Blood   Result Value Ref Range    HS Troponin T 7 <22 ng/L    Troponin T Delta 0 >=-4 - <+4 ng/L   D-dimer, Quantitative    Specimen: Blood   Result Value Ref Range    D-Dimer, Quantitative <0.27 0.00 - 0.51 MCGFEU/mL   ECG 12 Lead Chest Pain   Result Value Ref Range    QT Interval 330 ms    QTC Interval 419 ms   ECG 12 Lead Chest Pain   Result Value Ref Range    QT Interval 350 ms    QTC Interval 425 ms   ECG 12 Lead Chest Pain   Result Value Ref Range    QT Interval 380 ms    QTC Interval 430 ms   Green Top (Gel)   Result Value Ref Range    Extra Tube Hold for add-ons.     Lavender Top   Result Value Ref Range    Extra Tube hold for add-on    Gold Top - SST   Result Value Ref Range    Extra Tube Hold for add-ons.    Light Blue Top   Result Value Ref Range    Extra Tube Hold for add-ons.      *Note: Due to a large number of results and/or encounters for the requested time period, some results have not been displayed. A complete set of results can be found in Results Review.                ED Course  ED Course as of 04/15/24 2357   Mon Apr 15, 2024   1636 EKG notes sinus rhythm.  97 bpm.  I directly evaluated the EKG of this patient and noted no acute abnormalities.  Electronically signed by Alex Cunningham DO, 04/15/24, 4:36 PM EDT.   [SF]   2039 Patient and I had discussed doing a CT chest PE protocol and were planning to proceed with this, however his chart flagged an IV contrast allergy.  He states that the contrast breaks about in hives and that he has to have Benadryl to have the IV contrast.  I have added a D-dimer.  If this is negative we will not do the IV contrast.  If it is elevated, will premedicate him with Benadryl and Pepcid and proceed with the study (his chart also flags an allergy to steroids, prednisone in particular, breaks him out in a rash and makes him numb all over).  Patient has 32 drug allergies listed on his chart. [CM]   2049 Troponin at 1701 was 7, repeat troponin at 2015 was also 7. [CM]   2343 Patient's emergency department stay has been uneventful.  He has some no signs of acute distress.  He has appeared comfortable and well throughout.  He states that his pain is unchanged.  We discussed his test results and his plan of care.  He voices understanding and agreement.  I will schedule him for an outpatient nuclear stress test.  He is advised close follow-up with his PCP, and to return to the emergency department immediately if symptoms worsen or any problems. [CM]      ED Course User Index  [CM] Niraj Lay MD  [SF] Alex Cunningham DO                 HEART Score: 3                              Medical Decision Making  Amount and/or Complexity of Data Reviewed  Labs: ordered. Decision-making details documented in ED Course.  Radiology: ordered. Decision-making details documented in ED Course.  ECG/medicine tests: ordered. Decision-making details documented in ED Course.    Risk  OTC drugs.  Prescription drug management.        Final diagnoses:   Chest pain, unspecified type       ED Disposition  ED Disposition       ED Disposition   Discharge    Condition   Stable    Comment   --               Tiera Valenzuela, APRN  [address] [facility] [address]  [phone]    Go in 2 days      [facility] EMERGENCY DEPARTMENT  [address] [place] [id number] [phone]  Go to   If symptoms worsen         Medication List      No changes were made to your prescriptions during this visit.       Please note that portions of this note were completed with a voice recognition program.        Niraj Lay MD  04/15/24 [id number]

## 2024-04-16 NOTE — DISCHARGE INSTRUCTIONS
Home to rest.  Take a baby aspirin, 81 mg, daily.  The hospital will contact you at the phone number you provided to schedule your appointment for your outpatient nuclear stress test.  See Tiera Caputo in the office in 2 days.  Return to the emergency department right away if symptoms worsen or any problems.

## 2024-04-17 ENCOUNTER — OFFICE VISIT (OUTPATIENT)
Dept: FAMILY MEDICINE CLINIC | Facility: CLINIC | Age: 54
End: 2024-04-17
Payer: COMMERCIAL

## 2024-04-17 ENCOUNTER — TELEPHONE (OUTPATIENT)
Dept: FAMILY MEDICINE CLINIC | Facility: CLINIC | Age: 54
End: 2024-04-17

## 2024-04-17 VITALS
HEIGHT: 67 IN | OXYGEN SATURATION: 99 % | WEIGHT: 233 LBS | SYSTOLIC BLOOD PRESSURE: 100 MMHG | DIASTOLIC BLOOD PRESSURE: 50 MMHG | TEMPERATURE: 97.7 F | HEART RATE: 86 BPM | BODY MASS INDEX: 36.57 KG/M2

## 2024-04-17 DIAGNOSIS — J45.30 MILD PERSISTENT ASTHMA WITHOUT COMPLICATION: ICD-10-CM

## 2024-04-17 DIAGNOSIS — J20.9 ACUTE BRONCHITIS, UNSPECIFIED ORGANISM: Primary | ICD-10-CM

## 2024-04-17 RX ORDER — FLUTICASONE PROPIONATE 110 UG/1
1 AEROSOL, METERED RESPIRATORY (INHALATION) 2 TIMES DAILY
Qty: 12 G | Refills: 1 | Status: SHIPPED | OUTPATIENT
Start: 2024-04-17

## 2024-04-17 RX ORDER — AZITHROMYCIN 250 MG/1
TABLET, FILM COATED ORAL
Qty: 6 TABLET | Refills: 0 | Status: SHIPPED | OUTPATIENT
Start: 2024-04-17

## 2024-04-17 NOTE — PROGRESS NOTES
Subjective        Chief Complaint  Fatigue, Sinusitis, and Cough    Subjective      Jaquan Mckay is a 53 y.o. male who presents today to [facility] for Fatigue, Sinusitis, and Cough. Past medical history is significant for prior history of brain tumor s/p removal, seizure disorder on dilantin, HTN, HLD, generalized anxiety disorder, and obesity per BMI.     ER Follow up:   Chest pain:   Cough:   Recently evaluated in the ER of [facility] on 4/15/2024 with complaints of chest pain.  Workup was negative.  EKG unremarkable per cardiology.  D-dimer negative.  Chest x-ray negative.  Troponin x 2 negative.  Chest CT without contrast negative for acute findings. The ER ordered an outpatient stress test which is pending.      He reports having a persistent cough for the last several weeks.  He has tried Mucinex and an albuterol inhaler without improvement. No fever/chills. No sputum production, however, he feels congested in his chest and sinuses.       Current Outpatient Medications:     acetaminophen (TYLENOL) 500 MG tablet, Take 1 tablet by mouth Every 6 (Six) Hours As Needed for Mild Pain., Disp: 30 tablet, Rfl: 2    albuterol sulfate  (90 Base) MCG/ACT inhaler, , Disp: , Rfl:     Alcohol Swabs (Alcohol Wipes) 70 % pads, 1 each 2 (Two) Times a Day., Disp: 100 each, Rfl: 5    aspirin 81 MG EC tablet, Take 1 tablet by mouth Daily., Disp: 30 tablet, Rfl: 5    azelastine (ASTELIN) 0.1 % nasal spray, , Disp: , Rfl:     cholestyramine (QUESTRAN) 4 GM/DOSE powder, Take 1 packet by mouth 2 (Two) Times a Day. mixed with a liquid, Disp: 378 g, Rfl: 3    clobetasol (TEMOVATE) 0.05 % ointment, Apply 1 application topically to the appropriate area as directed 2 (Two) Times a Day., Disp: 60 g, Rfl: 2    dexlansoprazole (Dexilant) 60 MG capsule, Take 1 capsule by mouth 2 (Two) Times a Day., Disp: 60 capsule, Rfl: 5    Dilantin 100 MG capsule, Take 2 capsules by mouth 2 (Two)  Times a Day., Disp: 120 capsule, Rfl: 5    diphenhydrAMINE (Benadryl Allergy) 25 MG tablet, Take 1-2 tablets by mouth Every 8 (Eight) Hours As Needed for Itching (or rash)., Disp: 120 tablet, Rfl: 2    Elastic Bandages & Supports (ACE Ankle Brace) misc, 1 each Daily., Disp: 1 each, Rfl: 0    fluticasone (Flovent HFA) 110 MCG/ACT inhaler, Inhale 1 puff 2 (Two) Times a Day., Disp: 12 g, Rfl: 1    guaiFENesin (Mucinex) 600 MG 12 hr tablet, Take 2 tablets by mouth 2 (Two) Times a Day., Disp: 20 tablet, Rfl: 0    lisinopril (PRINIVIL,ZESTRIL) 30 MG tablet, Take 1 tablet by mouth Daily. FOR BLOOD PRESSURE, Disp: 30 tablet, Rfl: 5    loperamide (IMODIUM) 2 MG capsule, Take 1 capsule by mouth 4 (Four) Times a Day As Needed for Diarrhea., Disp: 12 capsule, Rfl: 0    loratadine (CLARITIN) 10 MG tablet, Take 1 tablet by mouth Daily As Needed for Allergies., Disp: 30 tablet, Rfl: 5    montelukast (SINGULAIR) 10 MG tablet, Take 1 tablet by mouth., Disp: , Rfl:     multivitamin with minerals tablet tablet, Take 1 tablet by mouth Daily., Disp: 30 tablet, Rfl: 12    rosuvastatin (CRESTOR) 40 MG tablet, Take 1 tablet by mouth Daily., Disp: 60 tablet, Rfl: 3    temazepam (Restoril) 15 MG capsule, Take 1 capsule by mouth At Night As Needed for Sleep., Disp: 30 capsule, Rfl: 0    azithromycin (Zithromax Z-Clark) 250 MG tablet, Take 2 tablets by mouth on day 1, then 1 tablet daily on days 2-5, Disp: 6 tablet, Rfl: 0    lubiprostone (Amitiza) 24 MCG capsule, Take 1 capsule by mouth 2 (Two) Times a Day With Meals., Disp: 60 capsule, Rfl: 0    mirtazapine (REMERON) 30 MG tablet, Take 1.5 tablets by mouth Every Night., Disp: 45 tablet, Rfl: 5    senna (Senokot) 8.6 MG tablet, Take 2 tablets by mouth 2 (Two) Times a Day., Disp: 120 tablet, Rfl: 0    tamsulosin (FLOMAX) 0.4 MG capsule 24 hr capsule, Take 1 capsule by mouth Daily., Disp: 90 capsule, Rfl: 3    TiZANidine (Zanaflex) 4 MG capsule, Take 1 capsule by mouth 3 (Three) Times a Day.,  "Disp: 30 capsule, Rfl: 5    vitamin D (ERGOCALCIFEROL) 1.25 MG (48414 UT) capsule capsule, Take 1 capsule by mouth Every 7 (Seven) Days., Disp: 4 capsule, Rfl: 5    Vortioxetine HBr (Trintellix) 5 MG tablet tablet, Take 1 tablet by mouth Daily With Breakfast., Disp: 35 tablet, Rfl: 0      Allergies   Allergen Reactions    Ciprofloxacin Anaphylaxis and Hives    Miralax [Polyethylene Glycol] Itching and Rash    Mobic [Meloxicam] Other (See Comments)     Pt states, \"It make my feet and hands go numb and I can't hardly walk.\"     Paxil [Paroxetine Hcl] Shortness Of Breath     Chest pain     Peanut-Containing Drug Products Anaphylaxis    Penicillins Anaphylaxis    Pristiq [Desvenlafaxine Succinate Er] Dizziness    Sulfa Antibiotics Anaphylaxis, Itching and Rash    Doxycycline Hives    Fish-Derived Products Hives    Isosorbide Nitrate Rash     Rash, hives, had to use inhaler.     Movantik [Naloxegol] Rash    Seroquel [Quetiapine] Hives and Rash    Buspar [Buspirone] Rash    Clarithromycin Rash    Clindamycin/Lincomycin Rash    Codeine Rash    Contrast Dye (Echo Or Unknown Ct/Mr) Itching and Rash    Diltiazem Rash    Flomax [Tamsulosin] Hives    Gabapentin Rash    Iodinated Contrast Media Itching and Rash    Keflex [Cephalexin] Rash    Levocetirizine Rash    Linzess [Linaclotide] Rash    Metoprolol Rash    Prednisone Rash and Other (See Comments)     Face, feet, and legs go completely numb per patient    Robitussin Cough+ Chest Max St [Dextromethorphan-Guaifenesin] Itching    Shrimp (Diagnostic) Rash    Spironolactone Rash    Viibryd [Vilazodone Hcl] Itching and Rash    Zoloft [Sertraline Hcl] Hives and Itching       Objective     Objective   Vital Signs:  /50   Pulse 86   Temp 97.7 °F (36.5 °C) (Temporal)   Ht 170.2 cm (67.01\")   Wt 106 kg (233 lb)   SpO2 99%   BMI 36.48 kg/m²   Estimated body mass index is 36.48 kg/m² as calculated from the following:    Height as of this encounter: 170.2 cm (67.01\").    " "Weight as of this encounter: 106 kg (233 lb).    Past Medical History:   Diagnosis Date    Allergic     Anxiety     Arthritis     Asthma     Body piercing     REPORTS CYLICONE IN EARS    Clotting disorder 2004    had a knee surgery    Coronary artery disease     Depression     DVT (deep venous thrombosis)     RIGHT RIGHT KNEE AFTER SURGERY YEARS AGO IN 2001 OR 2004    Elevated cholesterol     Gastric ulcer     GERD (gastroesophageal reflux disease)     H/O migraine     Headache     Heart attack     REPORTS \"LIGHT HEART ATTACK A LONG TIME AGO\"  \"EARLY 90'S\"    History of seizures     REPORTS LAST EPISODE WAS AROUND 1995.    Hostility     Hyperlipidemia     Hypertension     Knee pain, acute     Left    Low back pain     Lyme disease     Migraine     MRSA (methicillin resistant Staphylococcus aureus)     REPORTS LAST TESTED + 2004. WAS TREATED HE REPORTS.  RIGHT ARM, RIGHT KNEE.    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     Obesity     Poor historian     [place] spotted fever     Seizures     Sleep apnea     Tattoo     Wears glasses      Past Surgical History:   Procedure Laterality Date    ABDOMINAL SURGERY      BACK SURGERY      BRAIN SURGERY  1986    Tumor removal     CARDIAC CATHETERIZATION N/A 9/28/2018    Procedure: Left Heart Cath;  Surgeon: Leandro Daily MD;  Location: [facility] CATH INVASIVE LOCATION;  Service: Cardiology    CHOLECYSTECTOMY      COLONOSCOPY      COLONOSCOPY N/A 8/2/2021    Procedure: COLONOSCOPY FOR SCREENING;  Surgeon: Irving Azar MD;  Location: [facility];  Service: Gastroenterology;  Laterality: N/A;    CYST REMOVAL      pilonidal cyst    ELBOW EPICONDYLECTOMY Right 7/23/2020    Procedure: LATERAL EPICONDYLAR RELEASE;  Surgeon: Jose Ruiz MD;  Location: [facility] OR;  Service: Orthopedics;  Laterality: Right;    ENDOSCOPY      ENDOSCOPY N/A 8/2/2021    Procedure: ESOPHAGOGASTRODUODENOSCOPY WITH BIOPSY;  Surgeon: Irving Azar MD;  Location: [facility] OR;  Service: " Gastroenterology;  Laterality: N/A;  esophageal dilatation to 20mm    FRACTURE SURGERY Right     elbow    KNEE ARTHROSCOPY Left 10/20/2017    Procedure: Diagnostic arthroscopy left knee with chondroplasty;  Surgeon: Marco Aguirre MD;  Location: [facility] OR;  Service:     KNEE ARTHROSCOPY Left 1/11/2021    Procedure: KNEE DIAGNOSTIC ARTHROSCOPY WITH  CHONDROPLASTY patella, femoral and medial;  Surgeon: Raul Eagle MD;  Location: [facility] OR;  Service: Orthopedics;  Laterality: Left;    KNEE SURGERY Right     MOUTH SURGERY      FULL MOUTH EXTRACTION    OTHER SURGICAL HISTORY      REPORTS 7 TICKS REMOVED FROM RIGHT ARM IN 2001 OR 2002    TENNIS ELBOW RELEASE Right 7/23/2020    Procedure: RIGHT TENNIS ELBOW RELEASE;  Surgeon: Jose Ruiz MD;  Location: [facility] OR;  Service: Orthopedics;  Laterality: Right;    TUMOR EXCISION      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     Social History     Socioeconomic History    Marital status:      Spouse name: Becca    Number of children: 2    Years of education: 12   Tobacco Use    Smoking status: Every Day     Current packs/day: 1.00     Average packs/day: 1 pack/day for 17.1 years (17.1 ttl pk-yrs)     Types: Cigars, Cigarettes     Start date: 4/11/2022     Passive exposure: Current    Smokeless tobacco: Never   Vaping Use    Vaping status: Never Used   Substance and Sexual Activity    Alcohol use: No    Drug use: No    Sexual activity: Defer     Partners: Female     Birth control/protection: None      Physical Exam  Vitals and nursing note reviewed.   Constitutional:       General: He is not in acute distress.     Appearance: He is well-developed. He is not diaphoretic.   HENT:      Head: Normocephalic and atraumatic.   Eyes:      General: No scleral icterus.        Right eye: No discharge.         Left eye: No discharge.      Conjunctiva/sclera: Conjunctivae normal.   Cardiovascular:      Rate and Rhythm: Normal rate and regular rhythm.      Heart  sounds: Normal heart sounds. No murmur heard.     No friction rub. No gallop.   Pulmonary:      Effort: Pulmonary effort is normal. No respiratory distress.      Breath sounds: Normal breath sounds. No wheezing or rales.   Chest:      Chest wall: No tenderness.   Musculoskeletal:         General: Normal range of motion.      Cervical back: Normal range of motion and neck supple.   Skin:     General: Skin is warm and dry.      Coloration: Skin is not pale.      Findings: No erythema or rash.   Neurological:      Mental Status: He is alert and oriented to person, place, and time.   Psychiatric:         Behavior: Behavior normal.        Result Review :  The following data was reviewed by: GREG Gracia on 04/17/2024:  Hemoglobin A1C   Date Value Ref Range Status   03/20/2024 5.40 4.80 - 5.60 % Final     TSH   Date Value Ref Range Status   03/20/2024 2.670 0.270 - 4.200 uIU/mL Final     HDL Cholesterol   Date Value Ref Range Status   03/20/2024 65 (H) 40 - 60 mg/dL Final     LDL Cholesterol    Date Value Ref Range Status   03/20/2024 136 (H) 0 - 100 mg/dL Final     Triglycerides   Date Value Ref Range Status   03/20/2024 74 0 - 150 mg/dL Final     Total Cholesterol   Date Value Ref Range Status   04/05/2016 232 (H) 0 - 200 mg/dL Final     Comment:       Cholesterol Reference Range:      Desirable                 < 200mg/dl      Borderline High        200-239mg/dl      High Risk                 > 239mg/dl             Assessment / Plan         Assessment   Diagnoses and all orders for this visit:    1. Acute bronchitis, unspecified organism (Primary)  2. Mild persistent asthma without complication  Continue albuterol inhaler as needed.  Refill of Flovent inhaler 1 puff twice daily sent to pharmacy, discussed restarted.  Azithromycin Dosepak, use as directed.  Reports previously tolerated the past.  Continue Mucinex.  If persistent cough, could consider changing lisinopril to another agent.  -     fluticasone  (Flovent HFA) 110 MCG/ACT inhaler; Inhale 1 puff 2 (Two) Times a Day.  Dispense: 12 g; Refill: 1  -     azithromycin (Zithromax Z-Clark) 250 MG tablet; Take 2 tablets by mouth on day 1, then 1 tablet daily on days 2-5  Dispense: 6 tablet; Refill: 0    3. Chest pain   Likely due to persistent cough as workup was negative in the ER.  Nuclear medicine stress test pending insurance approval and scheduling.  He is to contact the office in the next 2 weeks if he has not heard about an appointment.  Recent chest x-ray, chest CT, and D-dimer normal and EKG without reported changes per cardiology.        New Medications Ordered This Visit   Medications    fluticasone (Flovent HFA) 110 MCG/ACT inhaler     Sig: Inhale 1 puff 2 (Two) Times a Day.     Dispense:  12 g     Refill:  1    azithromycin (Zithromax Z-Clark) 250 MG tablet     Sig: Take 2 tablets by mouth on day 1, then 1 tablet daily on days 2-5     Dispense:  6 tablet     Refill:  0     I spent 32 minutes caring for Jaquan Mckay on this date of service. This time includes time spent by me in the following activities: preparing for the visit, reviewing tests, obtaining and/or reviewing a separately obtained history, performing a medically appropriate examination and/or evaluation , counseling and educating the patient/family/caregiver, ordering medications, tests, or procedures and documenting information in the medical record.     Follow Up   Return for Follow up with PCP as scheduled.    Patient was given instructions and counseling regarding his condition or for health maintenance advice. Please see specific information pulled into the AVS if appropriate.       This document has been electronically signed by GREG Gracia   April 17, 2024 12:59 EDT    Dictated Utilizing Dragon Dictation: Part of this note may be an electronic transcription/translation of spoken language to printed text using the Dragon Dictation System.

## 2024-04-17 NOTE — TELEPHONE ENCOUNTER
Pharmacy Name: [place], [place] - [address] - [phone] [facility] [phone]      Pharmacy representative name: KATERINA    Pharmacy representative phone number: [phone]     What medication are you calling in regards to: AZITHROMYCIN    What question does the pharmacy have: PHARMACY HAS PATIENT MARKED AS BEING ALLERGIC TO CLARITHROMYCIN SO HE CAN NOT TAKE THIS MEDICATION. PLEASE ADVISE PHARMACY

## 2024-04-25 ENCOUNTER — TELEPHONE (OUTPATIENT)
Dept: UROLOGY | Facility: CLINIC | Age: 54
End: 2024-04-25
Payer: COMMERCIAL

## 2024-04-25 ENCOUNTER — OFFICE VISIT (OUTPATIENT)
Dept: GASTROENTEROLOGY | Facility: CLINIC | Age: 54
End: 2024-04-25
Payer: COMMERCIAL

## 2024-04-25 VITALS
BODY MASS INDEX: 37.04 KG/M2 | WEIGHT: 236 LBS | SYSTOLIC BLOOD PRESSURE: 109 MMHG | HEIGHT: 67 IN | DIASTOLIC BLOOD PRESSURE: 68 MMHG | HEART RATE: 90 BPM

## 2024-04-25 DIAGNOSIS — K58.1 IRRITABLE BOWEL SYNDROME WITH CONSTIPATION: Primary | ICD-10-CM

## 2024-04-25 NOTE — TELEPHONE ENCOUNTER
[facility] called concerning the prescription for plecanatide and citricle.  The patient is allergic to linzest and there are some of the same ingredients as the plecanatide.    The citricle was wrote to take 2 applications daily.  It needs to be corrected to take it 2 times a day.  Please call [facility] pharmacy to clarify.

## 2024-04-25 NOTE — PROGRESS NOTES
DATE OF CONSULTATION:  4/25/2024    REASON FOR REFERRAL: Constipation    REFERRING PHYSICIAN:  BALDOMERO Thao    CHIEF COMPLAINT:  Diarrhea    HISTORY OF PRESENT ILLNESS:   Jaquan Mckay is a very pleasant 53 y.o. male who is being seen today at the request of BALDOMERO Thao for evaluation and treatment of constipation.  In review of his records, he was previously evaluated by Marisela Luong PA-C and was last seen in 2022.  Patient has history of constipation and has previously tried over-the-counter stool softeners/laxatives and Amitiza without improvement.  With Linzess, he reports allergic reaction with rash.  Of note, he is s/p cholecystectomy and at one point he was struggling with diarrhea and was placed on Colestid and cholestyramine powder which patient says he is not currently taking.  At present, he complains of diarrhea and reports having 4-5 BMs per day with stool type IV or VII on Barren stool scale.  He has associated fecal urgency particularly after eating.  He also complains of intermittent lower abdominal pain/cramping and abdominal bloating.  Patient had recent KUB on 4/12/2024 which revealed large stool burden which was reviewed with patient in clinic and discussed how he is likely having overflow diarrhea from constipation.  Of note, he had previous colonoscopy in August 2021 which revealed diverticulosis in the right and left colon.  He also had 1 colon polyp removed which was a tubular adenoma.  Repeat colonoscopy was recommended in 5 years.  He has no other complaints today.    PAST MEDICAL HISTORY:  Past Medical History:   Diagnosis Date    Allergic     Anxiety     Arthritis     Asthma     Body piercing     REPORTS CYLICONE IN EARS    Clotting disorder 2004    had a knee surgery    Coronary artery disease     Depression     DVT (deep venous thrombosis)     RIGHT RIGHT KNEE AFTER SURGERY YEARS AGO IN 2001 OR 2004    Elevated cholesterol     Gastric ulcer     GERD  "(gastroesophageal reflux disease)     H/O migraine     Headache     Heart attack     REPORTS \"LIGHT HEART ATTACK A LONG TIME AGO\"  \"EARLY 90'S\"    History of seizures     REPORTS LAST EPISODE WAS AROUND 1995.    Hostility     Hyperlipidemia     Hypertension     Knee pain, acute     Left    Low back pain     Lyme disease     Migraine     MRSA (methicillin resistant Staphylococcus aureus)     REPORTS LAST TESTED + 2004. WAS TREATED HE REPORTS.  RIGHT ARM, RIGHT KNEE.    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     Obesity     Poor historian     [place] spotted fever     Seizures     Sleep apnea     Tattoo     Wears glasses        PAST SURGICAL HISTORY:  Past Surgical History:   Procedure Laterality Date    ABDOMINAL SURGERY      BACK SURGERY      BRAIN SURGERY  1986    Tumor removal     CARDIAC CATHETERIZATION N/A 9/28/2018    Procedure: Left Heart Cath;  Surgeon: Leandro Daily MD;  Location: [facility] CATH INVASIVE LOCATION;  Service: Cardiology    CHOLECYSTECTOMY      COLONOSCOPY      COLONOSCOPY N/A 8/2/2021    Procedure: COLONOSCOPY FOR SCREENING;  Surgeon: Irving Azar MD;  Location: [facility];  Service: Gastroenterology;  Laterality: N/A;    CYST REMOVAL      pilonidal cyst    ELBOW EPICONDYLECTOMY Right 7/23/2020    Procedure: LATERAL EPICONDYLAR RELEASE;  Surgeon: Jose Ruiz MD;  Location: [facility] OR;  Service: Orthopedics;  Laterality: Right;    ENDOSCOPY      ENDOSCOPY N/A 8/2/2021    Procedure: ESOPHAGOGASTRODUODENOSCOPY WITH BIOPSY;  Surgeon: Irving Azar MD;  Location: [facility] OR;  Service: Gastroenterology;  Laterality: N/A;  esophageal dilatation to 20mm    FRACTURE SURGERY Right     elbow    KNEE ARTHROSCOPY Left 10/20/2017    Procedure: Diagnostic arthroscopy left knee with chondroplasty;  Surgeon: Marco Aguirre MD;  Location: [facility] OR;  Service:     KNEE ARTHROSCOPY Left 1/11/2021    Procedure: KNEE DIAGNOSTIC ARTHROSCOPY WITH  CHONDROPLASTY patella, femoral and medial; "  Surgeon: Raul Eagle MD;  Location: [facility] OR;  Service: Orthopedics;  Laterality: Left;    KNEE SURGERY Right     MOUTH SURGERY      FULL MOUTH EXTRACTION    OTHER SURGICAL HISTORY      REPORTS 7 TICKS REMOVED FROM RIGHT ARM IN 2001 OR 2002    TENNIS ELBOW RELEASE Right 7/23/2020    Procedure: RIGHT TENNIS ELBOW RELEASE;  Surgeon: Jose Ruiz MD;  Location: [facility] OR;  Service: Orthopedics;  Laterality: Right;    TUMOR EXCISION      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       FAMILY HISTORY:  Family History   Problem Relation Age of Onset    Diabetes Mother     Hypertension Mother     Stroke Mother     Diabetes Father     Skin cancer Father     Hypertension Father     Heart attack Father     Diabetes Brother     Hypertension Brother     Heart disease Maternal Aunt     Heart disease Maternal Uncle     Heart disease Paternal Aunt     Heart disease Paternal Uncle     Heart disease Maternal Grandmother     Heart disease Maternal Grandfather     Heart disease Paternal Grandmother     Heart disease Paternal Grandfather        SOCIAL HISTORY:  Social History     Socioeconomic History    Marital status:      Spouse name: Becca    Number of children: 2    Years of education: 12   Tobacco Use    Smoking status: Every Day     Current packs/day: 1.00     Average packs/day: 1 pack/day for 17.1 years (17.1 ttl pk-yrs)     Types: Cigars, Cigarettes     Start date: 4/11/2022     Passive exposure: Current    Smokeless tobacco: Never   Vaping Use    Vaping status: Never Used   Substance and Sexual Activity    Alcohol use: No    Drug use: No    Sexual activity: Defer     Partners: Female     Birth control/protection: None     MEDICATIONS:  The current medication list was reviewed in the EMR    Current Outpatient Medications:     acetaminophen (TYLENOL) 500 MG tablet, Take 1 tablet by mouth Every 6 (Six) Hours As Needed for Mild Pain., Disp: 30 tablet, Rfl: 2    albuterol sulfate  (90 Base)  MCG/ACT inhaler, , Disp: , Rfl:     Alcohol Swabs (Alcohol Wipes) 70 % pads, 1 each 2 (Two) Times a Day., Disp: 100 each, Rfl: 5    aspirin 81 MG EC tablet, Take 1 tablet by mouth Daily., Disp: 30 tablet, Rfl: 5    azelastine (ASTELIN) 0.1 % nasal spray, , Disp: , Rfl:     azithromycin (Zithromax Z-Clark) 250 MG tablet, Take 2 tablets by mouth on day 1, then 1 tablet daily on days 2-5, Disp: 6 tablet, Rfl: 0    cholestyramine (QUESTRAN) 4 GM/DOSE powder, Take 1 packet by mouth 2 (Two) Times a Day. mixed with a liquid, Disp: 378 g, Rfl: 3    clobetasol (TEMOVATE) 0.05 % ointment, Apply 1 application topically to the appropriate area as directed 2 (Two) Times a Day., Disp: 60 g, Rfl: 2    dexlansoprazole (Dexilant) 60 MG capsule, Take 1 capsule by mouth 2 (Two) Times a Day., Disp: 60 capsule, Rfl: 5    Dilantin 100 MG capsule, Take 2 capsules by mouth 2 (Two) Times a Day., Disp: 120 capsule, Rfl: 5    diphenhydrAMINE (Benadryl Allergy) 25 MG tablet, Take 1-2 tablets by mouth Every 8 (Eight) Hours As Needed for Itching (or rash)., Disp: 120 tablet, Rfl: 2    Elastic Bandages & Supports (ACE Ankle Brace) misc, 1 each Daily., Disp: 1 each, Rfl: 0    fluticasone (Flovent HFA) 110 MCG/ACT inhaler, Inhale 1 puff 2 (Two) Times a Day., Disp: 12 g, Rfl: 1    guaiFENesin (Mucinex) 600 MG 12 hr tablet, Take 2 tablets by mouth 2 (Two) Times a Day., Disp: 20 tablet, Rfl: 0    lisinopril (PRINIVIL,ZESTRIL) 30 MG tablet, Take 1 tablet by mouth Daily. FOR BLOOD PRESSURE, Disp: 30 tablet, Rfl: 5    loperamide (IMODIUM) 2 MG capsule, Take 1 capsule by mouth 4 (Four) Times a Day As Needed for Diarrhea., Disp: 12 capsule, Rfl: 0    loratadine (CLARITIN) 10 MG tablet, Take 1 tablet by mouth Daily As Needed for Allergies., Disp: 30 tablet, Rfl: 5    lubiprostone (Amitiza) 24 MCG capsule, Take 1 capsule by mouth 2 (Two) Times a Day With Meals., Disp: 60 capsule, Rfl: 0    mirtazapine (REMERON) 30 MG tablet, Take 1.5 tablets by mouth Every  "Night., Disp: 45 tablet, Rfl: 5    montelukast (SINGULAIR) 10 MG tablet, Take 1 tablet by mouth., Disp: , Rfl:     multivitamin with minerals tablet tablet, Take 1 tablet by mouth Daily., Disp: 30 tablet, Rfl: 12    rosuvastatin (CRESTOR) 40 MG tablet, Take 1 tablet by mouth Daily., Disp: 60 tablet, Rfl: 3    senna (Senokot) 8.6 MG tablet, Take 2 tablets by mouth 2 (Two) Times a Day., Disp: 120 tablet, Rfl: 0    tamsulosin (FLOMAX) 0.4 MG capsule 24 hr capsule, Take 1 capsule by mouth Daily., Disp: 90 capsule, Rfl: 3    temazepam (Restoril) 15 MG capsule, Take 1 capsule by mouth At Night As Needed for Sleep., Disp: 30 capsule, Rfl: 0    TiZANidine (Zanaflex) 4 MG capsule, Take 1 capsule by mouth 3 (Three) Times a Day., Disp: 30 capsule, Rfl: 5    vitamin D (ERGOCALCIFEROL) 1.25 MG (88300 UT) capsule capsule, Take 1 capsule by mouth Every 7 (Seven) Days., Disp: 4 capsule, Rfl: 5    Vortioxetine HBr (Trintellix) 5 MG tablet tablet, Take 1 tablet by mouth Daily With Breakfast., Disp: 35 tablet, Rfl: 0    ALLERGIES:    Allergies   Allergen Reactions    Ciprofloxacin Anaphylaxis and Hives    Miralax [Polyethylene Glycol] Itching and Rash    Mobic [Meloxicam] Other (See Comments)     Pt states, \"It make my feet and hands go numb and I can't hardly walk.\"     Paxil [Paroxetine Hcl] Shortness Of Breath     Chest pain     Peanut-Containing Drug Products Anaphylaxis    Penicillins Anaphylaxis    Pristiq [Desvenlafaxine Succinate Er] Dizziness    Sulfa Antibiotics Anaphylaxis, Itching and Rash    Doxycycline Hives    Fish-Derived Products Hives    Isosorbide Nitrate Rash     Rash, hives, had to use inhaler.     Movantik [Naloxegol] Rash    Seroquel [Quetiapine] Hives and Rash    Buspar [Buspirone] Rash    Clarithromycin Rash    Clindamycin/Lincomycin Rash    Codeine Rash    Contrast Dye (Echo Or Unknown Ct/Mr) Itching and Rash    Diltiazem Rash    Flomax [Tamsulosin] Hives    Gabapentin Rash    Iodinated Contrast Media Itching " "and Rash    Keflex [Cephalexin] Rash    Levocetirizine Rash    Linzess [Linaclotide] Rash    Metoprolol Rash    Prednisone Rash and Other (See Comments)     Face, feet, and legs go completely numb per patient    Robitussin Cough+ Chest Max St [Dextromethorphan-Guaifenesin] Itching    Shrimp (Diagnostic) Rash    Spironolactone Rash    Viibryd [Vilazodone Hcl] Itching and Rash    Zoloft [Sertraline Hcl] Hives and Itching     REVIEW OF SYSTEMS:    A comprehensive 14 point review of systems was performed.  Significant findings as mentioned above.  All other systems reviewed and are negative.      Physical Exam   Vital Signs: /68 (BP Location: Left arm, Patient Position: Sitting, Cuff Size: Large Adult)   Pulse 90   Ht 170.2 cm (67.01\")   Wt 107 kg (236 lb)   BMI 36.95 kg/m²     General: Well developed, well nourished, alert and oriented x 3, in no acute distress.   Head: ATNC   Eyes: PERRL, No evidence of conjunctivitis.   Nose: No nasal discharge.   Mouth: Oral mucosal membranes moist. No oral ulceration or hemorrhages.   Neck: Neck supple. No thyromegaly. No JVD.   Lungs: Clear in all fields to A&P without rales, rhonchi or wheezing.   Heart:  Regular rate and rhythm. No murmurs, rubs, or gallops.   Abdomen: Soft. Bowel sounds are normoactive. Nontender with palpation.   Extremities: No cyanosis or edema.   Neurologic: Grossly non-focal exam    RECENT LABS:  Lab Results   Component Value Date    WBC 8.28 04/15/2024    HGB 16.1 04/15/2024    HCT 45.1 04/15/2024    MCV 92.0 04/15/2024    RDW 12.3 04/15/2024     04/15/2024    NEUTRORELPCT 62.7 04/15/2024    LYMPHORELPCT 24.3 04/15/2024    MONORELPCT 12.0 04/15/2024    EOSRELPCT 0.4 04/15/2024    BASORELPCT 0.4 04/15/2024    NEUTROABS 5.20 04/15/2024    LYMPHSABS 2.01 04/15/2024       Lab Results   Component Value Date     04/15/2024    K 3.8 04/15/2024    CO2 21.8 (L) 04/15/2024     04/15/2024    BUN 16 04/15/2024    CREATININE 1.03 " 04/15/2024    EGFRIFNONA 66 02/02/2022    EGFRIFAFRI  08/26/2016      Comment:      <15 Indicative of kidney failure.    GLUCOSE 95 04/15/2024    CALCIUM 9.5 04/15/2024    ALKPHOS 112 04/15/2024    AST 22 04/15/2024    ALT 21 04/15/2024    BILITOT 0.3 04/15/2024    ALBUMIN 4.3 04/15/2024    PROTEINTOT 7.6 04/15/2024    MG 1.5 (L) 12/19/2023       ASSESSMENT & PLAN:  Jaquan Mckay is a very pleasant 53 y.o. male with    1.  IBS-C:  -Patient had recent KUB on 4/12/2024 which revealed large stool burden which was reviewed with patient in clinic and discussed how he is likely having overflow diarrhea from constipation.  He has previously tried over-the-counter stool softeners/laxatives and Amitiza without improvement.  With Linzess, he reports allergic reaction with rash.  Therefore, will start trial of Trulance.  Rx and samples provided today.  Also recommended starting Citrucel 2 tablespoons in 8 ounces of water daily to help bulk stool and improve bowel movements.  Will have patient return to clinic in 8 to 12 weeks for symptom check.    The patient was in agreement with the plan and all questions were answered to his satisfaction.     Thank you so much for allowing us to participate in the care of Jaquan Mckay . Please do not hesitate to contact us with any questions or concerns.             Electronically Signed by: BALDOMERO Wheat , April 25, 2024 11:06 EDT       CC:   BALDOMERO Thao Cynthia, APRN

## 2024-04-26 DIAGNOSIS — K58.1 IRRITABLE BOWEL SYNDROME WITH CONSTIPATION: Primary | ICD-10-CM

## 2024-04-26 RX ORDER — CALCIUM POLYCARBOPHIL 625 MG
625 TABLET ORAL 2 TIMES DAILY
Qty: 60 TABLET | Refills: 3 | Status: SHIPPED | OUTPATIENT
Start: 2024-04-26

## 2024-04-26 NOTE — PROGRESS NOTES
Patient was seen in clinic yesterday.  Rx for Trulance was provided.  However, he reports having allergy to Trulance which was not reported yesterday during office visit and he cannot currently tell me the reaction he had.  Per medical records, patient has 32 allergies listed including Movantik and Linzess.  He has previously tried Amitiza without improvement.  Therefore, we will start FiberCon 1 tablet p.o. twice daily with full glass of water.  Also recommended magnesium citrate Gummies-2 Gummies daily.  If this is not helpful, could consider trial of Ibsrela.

## 2024-04-29 DIAGNOSIS — R56.9 SEIZURES: ICD-10-CM

## 2024-04-29 RX ORDER — PHENYTOIN SODIUM 100 MG/1
200 CAPSULE, EXTENDED RELEASE ORAL 2 TIMES DAILY
Qty: 120 CAPSULE | Refills: 2 | Status: SHIPPED | OUTPATIENT
Start: 2024-04-29

## 2024-05-09 ENCOUNTER — OFFICE VISIT (OUTPATIENT)
Dept: FAMILY MEDICINE CLINIC | Facility: CLINIC | Age: 54
End: 2024-05-09
Payer: COMMERCIAL

## 2024-05-09 VITALS
OXYGEN SATURATION: 98 % | WEIGHT: 238 LBS | DIASTOLIC BLOOD PRESSURE: 82 MMHG | BODY MASS INDEX: 37.35 KG/M2 | HEART RATE: 84 BPM | SYSTOLIC BLOOD PRESSURE: 114 MMHG | HEIGHT: 67 IN | RESPIRATION RATE: 16 BRPM

## 2024-05-09 DIAGNOSIS — E78.2 MIXED HYPERLIPIDEMIA: ICD-10-CM

## 2024-05-09 DIAGNOSIS — K21.9 GASTROESOPHAGEAL REFLUX DISEASE WITHOUT ESOPHAGITIS: ICD-10-CM

## 2024-05-09 DIAGNOSIS — J32.1 CHRONIC FRONTAL SINUSITIS: ICD-10-CM

## 2024-05-09 DIAGNOSIS — F51.04 PSYCHOPHYSIOLOGICAL INSOMNIA: ICD-10-CM

## 2024-05-09 DIAGNOSIS — E55.9 VITAMIN D DEFICIENCY: ICD-10-CM

## 2024-05-09 DIAGNOSIS — I10 ESSENTIAL HYPERTENSION: ICD-10-CM

## 2024-05-09 DIAGNOSIS — R21 RASH AND OTHER NONSPECIFIC SKIN ERUPTION: ICD-10-CM

## 2024-05-09 PROCEDURE — 1125F AMNT PAIN NOTED PAIN PRSNT: CPT | Performed by: NURSE PRACTITIONER

## 2024-05-09 PROCEDURE — 99214 OFFICE O/P EST MOD 30 MIN: CPT | Performed by: NURSE PRACTITIONER

## 2024-05-09 PROCEDURE — 3079F DIAST BP 80-89 MM HG: CPT | Performed by: NURSE PRACTITIONER

## 2024-05-09 PROCEDURE — 3074F SYST BP LT 130 MM HG: CPT | Performed by: NURSE PRACTITIONER

## 2024-05-09 PROCEDURE — 1160F RVW MEDS BY RX/DR IN RCRD: CPT | Performed by: NURSE PRACTITIONER

## 2024-05-09 PROCEDURE — 1159F MED LIST DOCD IN RCRD: CPT | Performed by: NURSE PRACTITIONER

## 2024-05-09 RX ORDER — TEMAZEPAM 15 MG/1
15 CAPSULE ORAL NIGHTLY PRN
Qty: 30 CAPSULE | Refills: 0 | Status: SHIPPED | OUTPATIENT
Start: 2024-05-09

## 2024-05-09 RX ORDER — ROSUVASTATIN CALCIUM 40 MG/1
40 TABLET, COATED ORAL DAILY
Qty: 90 TABLET | Refills: 2 | Status: SHIPPED | OUTPATIENT
Start: 2024-05-09

## 2024-05-09 RX ORDER — LORATADINE 10 MG/1
10 TABLET ORAL DAILY PRN
Qty: 90 TABLET | Refills: 2 | Status: SHIPPED | OUTPATIENT
Start: 2024-05-09

## 2024-05-09 RX ORDER — MIRTAZAPINE 30 MG/1
45 TABLET, FILM COATED ORAL NIGHTLY
Qty: 45 TABLET | Refills: 5 | Status: SHIPPED | OUTPATIENT
Start: 2024-05-09

## 2024-05-09 RX ORDER — ACETAMINOPHEN 500 MG
500 TABLET ORAL EVERY 6 HOURS PRN
Qty: 30 TABLET | Refills: 2 | Status: SHIPPED | OUTPATIENT
Start: 2024-05-09

## 2024-05-09 RX ORDER — LISINOPRIL 30 MG/1
30 TABLET ORAL DAILY
Qty: 90 TABLET | Refills: 2 | Status: SHIPPED | OUTPATIENT
Start: 2024-05-09

## 2024-05-09 RX ORDER — ASPIRIN 81 MG/1
81 TABLET ORAL DAILY
Qty: 90 TABLET | Refills: 2 | Status: SHIPPED | OUTPATIENT
Start: 2024-05-09

## 2024-05-09 RX ORDER — ERGOCALCIFEROL 1.25 MG/1
50000 CAPSULE ORAL
Qty: 12 CAPSULE | Refills: 2 | Status: SHIPPED | OUTPATIENT
Start: 2024-05-09

## 2024-05-09 RX ORDER — DIPHENHYDRAMINE HCL 25 MG
25-50 TABLET ORAL EVERY 8 HOURS PRN
Qty: 120 TABLET | Refills: 2 | Status: SHIPPED | OUTPATIENT
Start: 2024-05-09

## 2024-05-09 RX ORDER — DEXLANSOPRAZOLE 60 MG/1
60 CAPSULE, DELAYED RELEASE ORAL 2 TIMES DAILY
Qty: 180 CAPSULE | Refills: 5 | Status: SHIPPED | OUTPATIENT
Start: 2024-05-09

## 2024-05-09 NOTE — PROGRESS NOTES
"Subjective   Jaquan Mckay is a 53 y.o. male.     Chief Complaint   Patient presents with    Hypertension       History of Present Illness     Left ankle pain-here today for continued pain.  He has a new brace to wear from Ortho. He has had it for approx 2 weeks.   He has a hard plastic shoe insert that crosses over the medial ankle and encompasses his upper calf region.  He reports that  he has completed PT.  If his medial ankle does not heal better, he will need surgical intervention again.  Cardiology-will be having some outpt testing after an ER visit on 04/15/2024 for CP.  He reports that he has had some intermittent CP.  He reports that he noted some HR changes and his BP went up.   Depression and anxiety-given samples of Trintellex at last appt. He reports that \"I couldn't take them\".  He reports that he got itching and had a rash.  He reports that it happened on his face and arms.   He continues to have difficulty sleeping.   He reports that he averages 3-4 hours at best.    GI problem-has been back to see GI.  His most recent KUB on 4/12/2024 revealed large stool burden.  Discussion with patient was that he likely had overflow diarrhea.  He is given a trial of Trulance prescription and samples were provided to the patient.  He was also recommended to start Citrucel 2 tablespoons in 8 ounces of water to help bulk stool he will return to the clinic in 8 to 12 weeks for symptom management check.  He continues Dexilant 60 mg for reflux symptoms  Vitamin D deficiency-chronic and ongoing.  Patient is presently taking vitamin D 50,000 units supplement.  No negative side effects of medication are reported.  Patient does need a refill on meds   Hypertension-stable on lisinopril 30 mg.  He denies any concerns with chest pain or palpitations today      The following portions of the patient's history were reviewed and updated as appropriate: CC, ROS, allergies, current medications, past family history, past medical " "history, past social history, past surgical history and problem list.      Review of Systems   Constitutional:  Positive for fatigue. Negative for appetite change, unexpected weight gain and unexpected weight loss.   HENT:  Negative for congestion, ear pain, postnasal drip, rhinorrhea, sore throat, swollen glands, trouble swallowing and voice change.    Eyes:  Negative for pain and visual disturbance.   Respiratory:  Negative for cough, chest tightness, shortness of breath and wheezing.    Cardiovascular:  Negative for chest pain, palpitations and leg swelling.   Gastrointestinal:  Positive for abdominal distention, abdominal pain, constipation and diarrhea. Negative for blood in stool, nausea and indigestion.   Genitourinary:  Negative for dysuria, hematuria and urgency.   Musculoskeletal:  Positive for arthralgias, back pain, gait problem and myalgias. Negative for joint swelling.   Skin:  Negative for color change and skin lesions.   Allergic/Immunologic: Negative.    Neurological:  Negative for dizziness, numbness and headache.   Hematological: Negative.    Psychiatric/Behavioral:  Positive for sleep disturbance. Negative for dysphoric mood and suicidal ideas. The patient is not nervous/anxious.    All other systems reviewed and are negative.      Objective     /82   Pulse 84   Resp 16   Ht 170.2 cm (67.01\")   Wt 108 kg (238 lb)   SpO2 98%   BMI 37.27 kg/m²     Physical Exam  Vitals reviewed.   Constitutional:       General: He is not in acute distress.     Appearance: He is well-developed. He is obese. He is not diaphoretic.   HENT:      Head: Normocephalic and atraumatic.      Jaw: No tenderness.      Right Ear: Hearing, tympanic membrane, ear canal and external ear normal.      Left Ear: Hearing, tympanic membrane, ear canal and external ear normal.      Nose: Nose normal. No nasal tenderness or congestion.      Right Sinus: No maxillary sinus tenderness or frontal sinus tenderness.      Left " Sinus: No maxillary sinus tenderness or frontal sinus tenderness.      Mouth/Throat:      Lips: Pink.      Mouth: Mucous membranes are moist.      Pharynx: Oropharynx is clear. Uvula midline.   Eyes:      General: Lids are normal. No scleral icterus.     Extraocular Movements:      Right eye: Normal extraocular motion and no nystagmus.      Left eye: Normal extraocular motion and no nystagmus.      Conjunctiva/sclera: Conjunctivae normal.      Pupils: Pupils are equal, round, and reactive to light.   Neck:      Thyroid: No thyromegaly or thyroid tenderness.      Vascular: No carotid bruit or JVD.      Trachea: No tracheal tenderness.   Cardiovascular:      Rate and Rhythm: Normal rate and regular rhythm.      Pulses:           Dorsalis pedis pulses are 2+ on the right side and 2+ on the left side.        Posterior tibial pulses are 2+ on the right side and 2+ on the left side.      Heart sounds: Normal heart sounds, S1 normal and S2 normal. No murmur heard.  Pulmonary:      Effort: Pulmonary effort is normal. No accessory muscle usage, prolonged expiration or respiratory distress.      Breath sounds: Normal breath sounds.   Chest:      Chest wall: No tenderness.   Abdominal:      General: Bowel sounds are normal. There is no distension.      Palpations: Abdomen is soft. There is no hepatomegaly, splenomegaly or mass.      Tenderness: There is no abdominal tenderness.   Musculoskeletal:         General: Tenderness present.      Cervical back: Normal range of motion and neck supple.      Thoracic back: Tenderness present.      Lumbar back: Tenderness present.      Right lower leg: No edema.      Left lower leg: No edema.      Left foot: Decreased range of motion. Normal capillary refill. Tenderness and bony tenderness present.      Comments: No muscular atrophy or flaccidity.   Lymphadenopathy:      Head:      Right side of head: No submental or submandibular adenopathy.      Left side of head: No submental or  submandibular adenopathy.      Cervical: No cervical adenopathy.      Right cervical: No superficial cervical adenopathy.     Left cervical: No superficial cervical adenopathy.   Skin:     General: Skin is warm and dry.      Capillary Refill: Capillary refill takes less than 2 seconds.      Coloration: Skin is not jaundiced or pale.      Findings: No erythema.      Nails: There is no clubbing.   Neurological:      Mental Status: He is alert and oriented to person, place, and time.      Cranial Nerves: No cranial nerve deficit or facial asymmetry.      Sensory: No sensory deficit.      Motor: No weakness, tremor, atrophy or abnormal muscle tone.      Coordination: Coordination normal.      Gait: Gait abnormal (moderate antalgia).      Deep Tendon Reflexes: Reflexes are normal and symmetric.   Psychiatric:         Attention and Perception: He is attentive.         Mood and Affect: Mood normal. Mood is not anxious or depressed.         Speech: Speech normal.         Behavior: Behavior normal. Behavior is cooperative.         Thought Content: Thought content normal.         Cognition and Memory: Cognition normal.         Judgment: Judgment normal.         Diagnoses and all orders for this visit:    1. Essential hypertension  Comments:  Continue under the care of cardiology.  Continue lisinopril 30 mg  Orders:  -     aspirin 81 MG EC tablet; Take 1 tablet by mouth Daily.  Dispense: 90 tablet; Refill: 2  -     lisinopril (PRINIVIL,ZESTRIL) 30 MG tablet; Take 1 tablet by mouth Daily. FOR BLOOD PRESSURE  Dispense: 90 tablet; Refill: 2    2. Psychophysiological insomnia  Comments:  Continue mirtazapine 45 mg.  continue Restoril to 30 mg.  Patient to report any negative side effects.  Continue under the [facility] for mental health support  Overview:  With depression and anxiety      Orders:  -     mirtazapine (REMERON) 30 MG tablet; Take 1.5 tablets by mouth Every Night.  Dispense: 45 tablet; Refill: 5  -     temazepam  (Restoril) 15 MG capsule; Take 1 capsule by mouth At Night As Needed for Sleep.  Dispense: 30 capsule; Refill: 0    3. Rash and other nonspecific skin eruption  Comments:  will provide benadryl for prophylaxis.  patient has had multiple allergic reactions.   Orders:  -     diphenhydrAMINE (Benadryl Allergy) 25 MG tablet; Take 1-2 tablets by mouth Every 8 (Eight) Hours As Needed for Itching (or rash).  Dispense: 120 tablet; Refill: 2    4. Chronic frontal sinusitis  Comments:  Continue montelukast 10 mg, nasal sprays, and Claritin 10 mg  Orders:  -     loratadine (CLARITIN) 10 MG tablet; Take 1 tablet by mouth Daily As Needed for Allergies.  Dispense: 90 tablet; Refill: 2    5. Mixed hyperlipidemia  -     rosuvastatin (CRESTOR) 40 MG tablet; Take 1 tablet by mouth Daily.  Dispense: 90 tablet; Refill: 2    6. Essential hypertension  Comments:  Continue lisinopril.  Encouraged to monitor blood pressure at home  Orders:  -     aspirin 81 MG EC tablet; Take 1 tablet by mouth Daily.  Dispense: 90 tablet; Refill: 2  -     lisinopril (PRINIVIL,ZESTRIL) 30 MG tablet; Take 1 tablet by mouth Daily. FOR BLOOD PRESSURE  Dispense: 90 tablet; Refill: 2    7. Psychophysiological insomnia  Comments:  continue remeron and restoril  Overview:  With depression and anxiety      Orders:  -     mirtazapine (REMERON) 30 MG tablet; Take 1.5 tablets by mouth Every Night.  Dispense: 45 tablet; Refill: 5  -     temazepam (Restoril) 15 MG capsule; Take 1 capsule by mouth At Night As Needed for Sleep.  Dispense: 30 capsule; Refill: 0    8. Gastroesophageal reflux disease without esophagitis  Comments:  Continue Dexilant 60 mg.  Avoid known food triggers  Orders:  -     dexlansoprazole (Dexilant) 60 MG capsule; Take 1 capsule by mouth 2 (Two) Times a Day.  Dispense: 180 capsule; Refill: 5    9. Vitamin D deficiency  -     vitamin D (ERGOCALCIFEROL) 1.25 MG (65743 UT) capsule capsule; Take 1 capsule by mouth Every 7 (Seven) Days.  Dispense: 12  capsule; Refill: 2    Other orders  -     acetaminophen (TYLENOL) 500 MG tablet; Take 1 tablet by mouth Every 6 (Six) Hours As Needed for Mild Pain.  Dispense: 30 tablet; Refill: 2         Understands disease processes and need for medications.  Understands reasons for urgent and emergent care.  Patient (& family) verbalized agreement for treatment plan.   Emotional support and active listening provided.  Patient provided time to verbalize feelings.    CHAVEZ/LINN reviewed today and consistent.  Will refill prescribed controlled medication today.  Patient is aware they cannot receive narcotics from any other provider except if under care of pain management or speciality clinic.  Risk and benefits of medication use has been reviewed.  History and physical exam exhibit continued safe and appropriate use of controlled substances.  The patient is aware of the potential for addiction and dependence.  This patient has been made aware of the appropriate use of such medications, including potential risk of somnolence, limited ability to drive and / or work safely, and potential for overdose.    It has also been made clear that these medications are for use by this patient only, without concomitant use of alcohol or other substances unless prescribed/advised by medical provider.  Patient understands they may be subject to UDS and pill counts at random.    Patient considered to be low risk for addiction due to use of single controlled medications.  Patient understands and accepts these risks.  Patient need for medication will be reassessed at each visit.  Doses will be adjusted according to patient need and findings.    Goal of TX: Patient will not have any adverse reactions of medication.  Patient will have improvement in sleep hygiene/pattern with use of Restoril as needed as directed.    CH[facility]Z Patient Controlled Substance Report (from 5/10/2023 to 5/9/2024)    Dispensed  Strength Quantity Days Supply Provider Pharmacy    04/09/2024 Temazepam 15MG 30 each 30 [facility]   03/12/2024 [facility]am 15MG 30 each [facility]   01/29/2024 Diazepam 10MG 1 each 1 [facility]   11/13/2023 Hydrocodone/Acetaminophen 325MG/7.5MG 12 each 4 [facility]        RTC 1 month, sooner if needed.             This document has been electronically signed by:  BALDOMERO Thao, FNP-C    Dragon disclaimer:  Part of this note may be an electronic transcription/translation of spoken language to printed text using the Dragon Dictation System.

## 2024-05-09 NOTE — PATIENT INSTRUCTIONS
Health Risks of Smoking  Smoking tobacco is very bad for your health. Tobacco smoke contains many toxic chemicals that can damage every part of your body. Secondhand smoke can be harmful to those around you. Tobacco or nicotine use can cause many long-term (chronic) diseases.  Smoking is difficult to quit because a chemical in tobacco, called nicotine, causes addiction or dependence. When you smoke and inhale, nicotine is absorbed quickly into the bloodstream through your lungs. Both inhaled and non-inhaled nicotine may be addictive.  How can quitting affect me?  There are health benefits of quitting smoking. Some benefits happen right away and others take time. Benefits may include:  Blood flow, blood pressure, heart rate, and lung capacity may begin to improve. However, any lung damage that has already occurred cannot be repaired.  Temporary respiratory symptoms, such as nasal congestion and cough, may improve over time.  Your risk of heart disease, stroke, and cancer is reduced.  The overall quality of your health may improve.  You may save money, as you will not spend money on tobacco products and may spend less money on smoking-related health issues.  What can increase my risk?  Smoking harms nearly every organ in the body. People who smoke tobacco have a shorter life expectancy and an increased risk of many serious medical problems. These include:  More respiratory infections, such as colds and pneumonia.  Cancer.  Heart disease.  Stroke.  Chronic respiratory diseases.  Delayed wound healing and increased risk of complications during surgery.  Problems with reproduction, pregnancy, and childbirth, such as infertility, early (premature) births, stillbirths, and birth defects.  Secondhand smoke exposure to children increases the risk of:  Sudden infant death syndrome (SIDS).  Infections in the nose, throat, or airways (respiratory infections).  Chronic respiratory symptoms.  What actions can I take to  quit?  Smoking is an addiction that affects both your body and your mind, and long-time habits can be hard to change. Your health care provider can recommend:  Nicotine replacement products, such as patches, gum, and nasal sprays. Use these products only as directed. Do not replace cigarette smoking with electronic cigarettes, which are commonly called e-cigarettes. The safety of e-cigarettes is not known, and some may contain harmful chemicals.  Programs and community resources, which may include group support, education, or talk therapy.  Prescription medicines to help reduce cravings.  A combination of two or more quit methods, which will increase the success of quitting.  Where to find support  Follow the recommendations from your health care provider about support groups and other assistance. You can also visit:  [place] [employer] Quitline Consortium: www.Logglyline.org or call 6-029-QUIT-NOW.  U.S. Department of Health and Human Services: www.smokefree.gov  American Lung Association: www.freedomfromsmoking.org  American Heart Association: www.heart.org  Where to find more information  Centers for Disease Control and Prevention: www.cdc.gov  World Health Organization: www.who.int  Summary  Smoking tobacco is very bad for your health. Tobacco smoke contains many toxic chemicals that can damage every part of the body.  Smoking is difficult to quit because a chemical in tobacco, called nicotine, causes addiction or dependence.  There are immediate and long-term health benefits of quitting smoking.  A combination of two or more quit methods increases the success of quitting.  This information is not intended to replace advice given to you by your health care provider. Make sure you discuss any questions you have with your health care provider.  Document Revised: 08/22/2022 Document Reviewed: 02/01/2021  Elsevier Patient Education © 2022 Elsevier Inc. Steps to Quit Smoking  Smoking tobacco is the leading cause of  preventable death. It can affect almost every organ in the body. Smoking puts you and those around you at risk for developing many serious chronic diseases. Quitting smoking can be difficult, but it is one of the best things that you can do for your health. It is never too late to quit.  How do I get ready to quit?  When you decide to quit smoking, create a plan to help you succeed. Before you quit:  Pick a date to quit. Set a date within the next 2 weeks to give you time to prepare.  Write down the reasons why you are quitting. Keep this list in places where you will see it often.  Tell your family, friends, and co-workers that you are quitting. Support from your loved ones can make quitting easier.  Talk with your health care provider about your options for quitting smoking.  Find out what treatment options are covered by your health insurance.  Identify people, places, things, and activities that make you want to smoke (triggers). Avoid them.  What first steps can I take to quit smoking?  Throw away all cigarettes at home, at work, and in your car.  Throw away smoking accessories, such as ashtrays and lighters.  Clean your car. Make sure to empty the ashtray.  Clean your home, including curtains and carpets.  What strategies can I use to quit smoking?  Talk with your health care provider about combining strategies, such as taking medicines while you are also receiving in-person counseling. Using these two strategies together makes you more likely to succeed in quitting than if you used either strategy on its own.  If you are pregnant or breastfeeding, talk with your health care provider about finding counseling or other support strategies to quit smoking. Do not take medicine to help you quit smoking unless your health care provider tells you to do so.  To quit smoking:  Quit right away  Quit smoking completely, instead of gradually reducing how much you smoke over a period of time. Research shows that stopping  smoking right away is more successful than gradually quitting.  Attend in-person counseling to help you build problem-solving skills. You are more likely to succeed in quitting if you attend counseling sessions regularly. Even short sessions of 10 minutes can be effective.  Take medicine  You may take medicines to help you quit smoking. Some medicines require a prescription and some you can purchase over-the-counter. Medicines may have nicotine in them to replace the nicotine in cigarettes. Medicines may:  Help to stop cravings.  Help to relieve withdrawal symptoms.  Your health care provider may recommend:  Nicotine patches, gum, or lozenges.  Nicotine inhalers or sprays.  Non-nicotine medicine that is taken by mouth.  Find resources  Find resources and support systems that can help you to quit smoking and remain smoke-free after you quit. These resources are most helpful when you use them often. They include:  Online chats with a counselor.  Telephone quitlines.  Printed self-help materials.  Support groups or group counseling.  Text messaging programs.  Mobile phone apps or applications. Use apps that can help you stick to your quit plan by providing reminders, tips, and encouragement. There are many free apps for mobile devices as well as websites. Examples include Quit Guide from the CDC and smokefree.gov  What things can I do to make it easier to quit?    Reach out to your family and friends for support and encouragement. Call telephone quitlines (6-946-QUIT-NOW), reach out to support groups, or work with a counselor for support.  Ask people who smoke to avoid smoking around you.  Avoid places that trigger you to smoke, such as bars, parties, or smoke-break areas at work.  Spend time with people who do not smoke.  Lessen the stress in your life. Stress can be a smoking trigger for some people. To lessen stress, try:  Exercising regularly.  Doing deep-breathing exercises.  Doing yoga.  Meditating.  Performing a  body scan. This involves closing your eyes, scanning your body from head to toe, and noticing which parts of your body are particularly tense. Try to relax the muscles in those areas.  How will I feel when I quit smoking?  Day 1 to 3 weeks  Within the first 24 hours of quitting smoking, you may start to feel withdrawal symptoms. These symptoms are usually most noticeable 2-3 days after quitting, but they usually do not last for more than 2-3 weeks. You may experience these symptoms:  Mood swings.  Restlessness, anxiety, or irritability.  Trouble concentrating.  Dizziness.  Strong cravings for sugary foods and nicotine.  Mild weight gain.  Constipation.  Nausea.  Coughing or a sore throat.  Changes in how the medicines that you take for unrelated issues work in your body.  Depression.  Trouble sleeping (insomnia).  Week 3 and afterward  After the first 2-3 weeks of quitting, you may start to notice more positive results, such as:  Improved sense of smell and taste.  Decreased coughing and sore throat.  Slower heart rate.  Lower blood pressure.  Clearer skin.  The ability to breathe more easily.  Fewer sick days.  Quitting smoking can be very challenging. Do not get discouraged if you are not successful the first time. Some people need to make many attempts to quit before they achieve long-term success. Do your best to stick to your quit plan, and talk with your health care provider if you have any questions or concerns.  Summary  Smoking tobacco is the leading cause of preventable death. Quitting smoking is one of the best things that you can do for your health.  When you decide to quit smoking, create a plan to help you succeed.  Quit smoking right away, not slowly over a period of time.  When you start quitting, seek help from your health care provider, family, or friends.  This information is not intended to replace advice given to you by your health care provider. Make sure you discuss any questions you have with  your health care provider.  Document Revised: 08/26/2022 Document Reviewed: 03/07/2020  Elsevier Patient Education © 2022 Elsevier Inc.

## 2024-05-10 ENCOUNTER — TELEPHONE (OUTPATIENT)
Dept: FAMILY MEDICINE CLINIC | Facility: CLINIC | Age: 54
End: 2024-05-10
Payer: COMMERCIAL

## 2024-05-25 PROCEDURE — 99283 EMERGENCY DEPT VISIT LOW MDM: CPT

## 2024-05-26 ENCOUNTER — HOSPITAL ENCOUNTER (EMERGENCY)
Facility: HOSPITAL | Age: 54
Discharge: HOME | End: 2024-05-26
Attending: EMERGENCY MEDICINE
Payer: COMMERCIAL

## 2024-05-26 VITALS
WEIGHT: 230 LBS | BODY MASS INDEX: 36.1 KG/M2 | TEMPERATURE: 98.2 F | RESPIRATION RATE: 18 BRPM | SYSTOLIC BLOOD PRESSURE: 130 MMHG | OXYGEN SATURATION: 99 % | HEART RATE: 78 BPM | DIASTOLIC BLOOD PRESSURE: 85 MMHG | HEIGHT: 67 IN

## 2024-05-26 DIAGNOSIS — R56.9 SEIZURE: Primary | ICD-10-CM

## 2024-05-26 LAB
ALBUMIN SERPL-MCNC: 4.3 G/DL (ref 3.5–5.2)
ALBUMIN/GLOB SERPL: 1.2 G/DL
ALP SERPL-CCNC: 98 U/L (ref 39–117)
ALT SERPL W P-5'-P-CCNC: 23 U/L (ref 1–41)
AMPHET+METHAMPHET UR QL: NEGATIVE
AMPHETAMINES UR QL: NEGATIVE
ANION GAP SERPL CALCULATED.3IONS-SCNC: 13.7 MMOL/L (ref 5–15)
AST SERPL-CCNC: 36 U/L (ref 1–40)
BARBITURATES UR QL SCN: POSITIVE
BASOPHILS # BLD AUTO: 0.03 10*3/MM3 (ref 0–0.2)
BASOPHILS NFR BLD AUTO: 0.4 % (ref 0–1.5)
BENZODIAZ UR QL SCN: POSITIVE
BILIRUB SERPL-MCNC: 0.3 MG/DL (ref 0–1.2)
BILIRUB UR QL STRIP: NEGATIVE
BUN SERPL-MCNC: 13 MG/DL (ref 6–20)
BUN/CREAT SERPL: 11.8 (ref 7–25)
BUPRENORPHINE SERPL-MCNC: NEGATIVE NG/ML
CALCIUM SPEC-SCNC: 9.2 MG/DL (ref 8.6–10.5)
CANNABINOIDS SERPL QL: NEGATIVE
CHLORIDE SERPL-SCNC: 102 MMOL/L (ref 98–107)
CLARITY UR: CLEAR
CO2 SERPL-SCNC: 18.3 MMOL/L (ref 22–29)
COCAINE UR QL: NEGATIVE
COLOR UR: YELLOW
CREAT SERPL-MCNC: 1.1 MG/DL (ref 0.76–1.27)
D-LACTATE SERPL-SCNC: 1.3 MMOL/L (ref 0.5–2)
DEPRECATED RDW RBC AUTO: 43.8 FL (ref 37–54)
EGFRCR SERPLBLD CKD-EPI 2021: 81.3 ML/MIN/1.73
EOSINOPHIL # BLD AUTO: 0.04 10*3/MM3 (ref 0–0.4)
EOSINOPHIL NFR BLD AUTO: 0.5 % (ref 0.3–6.2)
ERYTHROCYTE [DISTWIDTH] IN BLOOD BY AUTOMATED COUNT: 12.9 % (ref 12.3–15.4)
FENTANYL UR-MCNC: NEGATIVE NG/ML
GLOBULIN UR ELPH-MCNC: 3.7 GM/DL
GLUCOSE SERPL-MCNC: 99 MG/DL (ref 65–99)
GLUCOSE UR STRIP-MCNC: NEGATIVE MG/DL
HCT VFR BLD AUTO: 44.9 % (ref 37.5–51)
HGB BLD-MCNC: 15.8 G/DL (ref 13–17.7)
HGB UR QL STRIP.AUTO: NEGATIVE
HOLD SPECIMEN: NORMAL
HOLD SPECIMEN: NORMAL
IMM GRANULOCYTES # BLD AUTO: 0.02 10*3/MM3 (ref 0–0.05)
IMM GRANULOCYTES NFR BLD AUTO: 0.2 % (ref 0–0.5)
KETONES UR QL STRIP: NEGATIVE
LEUKOCYTE ESTERASE UR QL STRIP.AUTO: NEGATIVE
LYMPHOCYTES # BLD AUTO: 2.23 10*3/MM3 (ref 0.7–3.1)
LYMPHOCYTES NFR BLD AUTO: 27.8 % (ref 19.6–45.3)
MCH RBC QN AUTO: 32.6 PG (ref 26.6–33)
MCHC RBC AUTO-ENTMCNC: 35.2 G/DL (ref 31.5–35.7)
MCV RBC AUTO: 92.6 FL (ref 79–97)
METHADONE UR QL SCN: NEGATIVE
MONOCYTES # BLD AUTO: 0.88 10*3/MM3 (ref 0.1–0.9)
MONOCYTES NFR BLD AUTO: 11 % (ref 5–12)
NEUTROPHILS NFR BLD AUTO: 4.83 10*3/MM3 (ref 1.7–7)
NEUTROPHILS NFR BLD AUTO: 60.1 % (ref 42.7–76)
NITRITE UR QL STRIP: NEGATIVE
NRBC BLD AUTO-RTO: 0 /100 WBC (ref 0–0.2)
OPIATES UR QL: NEGATIVE
OXYCODONE UR QL SCN: NEGATIVE
PCP UR QL SCN: NEGATIVE
PH UR STRIP.AUTO: 5.5 [PH] (ref 5–8)
PHENYTOIN SERPL-MCNC: 13 MCG/ML (ref 10–20)
PLATELET # BLD AUTO: 171 10*3/MM3 (ref 140–450)
PMV BLD AUTO: 9.7 FL (ref 6–12)
POTASSIUM SERPL-SCNC: 4.5 MMOL/L (ref 3.5–5.2)
PROT SERPL-MCNC: 8 G/DL (ref 6–8.5)
PROT UR QL STRIP: NEGATIVE
RBC # BLD AUTO: 4.85 10*6/MM3 (ref 4.14–5.8)
SODIUM SERPL-SCNC: 134 MMOL/L (ref 136–145)
SP GR UR STRIP: 1.02 (ref 1–1.03)
TRICYCLICS UR QL SCN: NEGATIVE
UROBILINOGEN UR QL STRIP: NORMAL
WBC NRBC COR # BLD AUTO: 8.03 10*3/MM3 (ref 3.4–10.8)
WHOLE BLOOD HOLD COAG: NORMAL
WHOLE BLOOD HOLD SPECIMEN: NORMAL

## 2024-05-26 PROCEDURE — 80185 ASSAY OF PHENYTOIN TOTAL: CPT | Performed by: STUDENT IN AN ORGANIZED HEALTH CARE EDUCATION/TRAINING PROGRAM

## 2024-05-26 PROCEDURE — 80307 DRUG TEST PRSMV CHEM ANLYZR: CPT | Performed by: STUDENT IN AN ORGANIZED HEALTH CARE EDUCATION/TRAINING PROGRAM

## 2024-05-26 PROCEDURE — 25810000003 SODIUM CHLORIDE 0.9 % SOLUTION: Performed by: NURSE PRACTITIONER

## 2024-05-26 PROCEDURE — 81003 URINALYSIS AUTO W/O SCOPE: CPT | Performed by: STUDENT IN AN ORGANIZED HEALTH CARE EDUCATION/TRAINING PROGRAM

## 2024-05-26 PROCEDURE — 83605 ASSAY OF LACTIC ACID: CPT | Performed by: STUDENT IN AN ORGANIZED HEALTH CARE EDUCATION/TRAINING PROGRAM

## 2024-05-26 PROCEDURE — 80053 COMPREHEN METABOLIC PANEL: CPT | Performed by: STUDENT IN AN ORGANIZED HEALTH CARE EDUCATION/TRAINING PROGRAM

## 2024-05-26 PROCEDURE — 85025 COMPLETE CBC W/AUTO DIFF WBC: CPT | Performed by: STUDENT IN AN ORGANIZED HEALTH CARE EDUCATION/TRAINING PROGRAM

## 2024-05-26 PROCEDURE — 36415 COLL VENOUS BLD VENIPUNCTURE: CPT

## 2024-05-26 RX ADMIN — SODIUM CHLORIDE 1000 ML: 9 INJECTION, SOLUTION INTRAVENOUS at 02:33

## 2024-05-26 NOTE — ED NOTES
Pt provided urine collection supplies and advised the need for sample. Pt verbalized understanding. Pt attempting to provide at this time.

## 2024-05-26 NOTE — ED TRIAGE NOTES
"         MEDICAL SCREENING:    Reason for Visit: patient says he had a \"light\" seizure at home    Patient initially seen in triage.  The patient was advised further evaluation and diagnostic testing will be needed, some of the treatment and testing will be initiated in the lobby in order to begin the process.  The patient will be returned to the waiting area for the time being and possibly be re-assessed by a subsequent ED provider.  The patient will be brought back to the treatment area in as timely manner as possible.    "

## 2024-05-31 NOTE — ED PROVIDER NOTES
"Subjective   History of Present Illness  Patient is a 53-year-old male with past medical history significant for anxiety, depression, seizure, chronic pain, GERD, migraines, hypertension, hyperlipidemia, migraines, Lyme disease, CAD and sleep apnea.  He presents the ED today for a seizure at home.  He states that he has had a headache for most of the day.  He states that he has not missed any of his seizure medication.  He denies any other significant complaints.        Review of Systems   Constitutional: Negative.  Negative for fever.   Eyes: Negative.    Respiratory: Negative.     Cardiovascular: Negative.  Negative for chest pain.   Gastrointestinal: Negative.  Negative for abdominal pain.   Endocrine: Negative.    Genitourinary: Negative.  Negative for dysuria.   Musculoskeletal: Negative.    Skin: Negative.    Allergic/Immunologic: Negative.    Neurological:  Positive for seizures and headaches.   Hematological: Negative.    Psychiatric/Behavioral: Negative.     All other systems reviewed and are negative.      Past Medical History:   Diagnosis Date    Allergic     Anxiety     Arthritis     Asthma     Body piercing     REPORTS CYLICONE IN EARS    Clotting disorder 2004    had a knee surgery    Coronary artery disease     Depression     DVT (deep venous thrombosis)     RIGHT RIGHT KNEE AFTER SURGERY YEARS AGO IN 2001 OR 2004    Elevated cholesterol     Gastric ulcer     GERD (gastroesophageal reflux disease)     H/O migraine     Headache     Heart attack     REPORTS \"LIGHT HEART ATTACK A LONG TIME AGO\"  \"EARLY 90'S\"    History of seizures     REPORTS LAST EPISODE WAS AROUND 1995.    Hostility     Hyperlipidemia     Hypertension     Knee pain, acute     Left    Low back pain     Lyme disease     Migraine     MRSA (methicillin resistant Staphylococcus aureus)     REPORTS LAST TESTED + 2004. WAS TREATED HE REPORTS.  RIGHT ARM, RIGHT KNEE.    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     Obesity     Poor historian     Hadley spotted " "fever     Seizures     Sleep apnea     Tattoo     Wears glasses        Allergies   Allergen Reactions    Ciprofloxacin Anaphylaxis and Hives    Miralax [Polyethylene Glycol] Itching and Rash    Mobic [Meloxicam] Other (See Comments)     Pt states, \"It make my feet and hands go numb and I can't hardly walk.\"     Paxil [Paroxetine Hcl] Shortness Of Breath     Chest pain     Peanut-Containing Drug Products Anaphylaxis    Penicillins Anaphylaxis    Pristiq [Desvenlafaxine Succinate Er] Dizziness    Sulfa Antibiotics Anaphylaxis, Itching and Rash    Doxycycline Hives    Fish-Derived Products Hives    Isosorbide Nitrate Rash     Rash, hives, had to use inhaler.     Movantik [Naloxegol] Rash    Seroquel [Quetiapine] Hives and Rash    Trulance [Plecanatide] Hives    Buspar [Buspirone] Rash    Clarithromycin Rash    Clindamycin/Lincomycin Rash    Codeine Rash    Contrast Dye (Echo Or Unknown Ct/Mr) Itching and Rash    Diltiazem Rash    Flomax [Tamsulosin] Hives    Gabapentin Rash    Iodinated Contrast Media Itching and Rash    Keflex [Cephalexin] Rash    Levocetirizine Rash    Linzess [Linaclotide] Rash    Metoprolol Rash    Prednisone Rash and Other (See Comments)     Face, feet, and legs go completely numb per patient    Robitussin Cough+ Chest Max St [Dextromethorphan-Guaifenesin] Itching    Shrimp (Diagnostic) Rash    Spironolactone Rash    Viibryd [Vilazodone Hcl] Itching and Rash    Zoloft [Sertraline Hcl] Hives and Itching       Past Surgical History:   Procedure Laterality Date    ABDOMINAL SURGERY      BACK SURGERY      BRAIN SURGERY  1986    Tumor removal     CARDIAC CATHETERIZATION N/A 9/28/2018    Procedure: Left Heart Cath;  Surgeon: Leandro Daily MD;  Location: [facility] CATH INVASIVE LOCATION;  Service: Cardiology    CHOLECYSTECTOMY      COLONOSCOPY      COLONOSCOPY N/A 8/2/2021    Procedure: COLONOSCOPY FOR SCREENING;  Surgeon: Irving Azar MD;  Location: [facility] OR;  Service: " Gastroenterology;  Laterality: N/A;    CYST REMOVAL      pilonidal cyst    ELBOW EPICONDYLECTOMY Right 7/23/2020    Procedure: LATERAL EPICONDYLAR RELEASE;  Surgeon: Jose Ruiz MD;  Location: [facility] OR;  Service: Orthopedics;  Laterality: Right;    ENDOSCOPY      ENDOSCOPY N/A 8/2/2021    Procedure: ESOPHAGOGASTRODUODENOSCOPY WITH BIOPSY;  Surgeon: Irving Azar MD;  Location: [facility] OR;  Service: Gastroenterology;  Laterality: N/A;  esophageal dilatation to 20mm    FRACTURE SURGERY Right     elbow    KNEE ARTHROSCOPY Left 10/20/2017    Procedure: Diagnostic arthroscopy left knee with chondroplasty;  Surgeon: Marco Aguirre MD;  Location: [facility] OR;  Service:     KNEE ARTHROSCOPY Left 1/11/2021    Procedure: KNEE DIAGNOSTIC ARTHROSCOPY WITH  CHONDROPLASTY patella, femoral and medial;  Surgeon: Raul Eagle MD;  Location: [facility] OR;  Service: Orthopedics;  Laterality: Left;    KNEE SURGERY Right     MOUTH SURGERY      FULL MOUTH EXTRACTION    OTHER SURGICAL HISTORY      REPORTS 7 TICKS REMOVED FROM RIGHT ARM IN 2001 OR 2002    TENNIS ELBOW RELEASE Right 7/23/2020    Procedure: RIGHT TENNIS ELBOW RELEASE;  Surgeon: Jose Ruiz MD;  Location: [facility] OR;  Service: Orthopedics;  Laterality: Right;    TUMOR EXCISION      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       Family History   Problem Relation Age of Onset    Diabetes Mother     Hypertension Mother     Stroke Mother     Diabetes Father     Skin cancer Father     Hypertension Father     Heart attack Father     Diabetes Brother     Hypertension Brother     Heart disease Maternal Aunt     Heart disease Maternal Uncle     Heart disease Paternal Aunt     Heart disease Paternal Uncle     Heart disease Maternal Grandmother     Heart disease Maternal Grandfather     Heart disease Paternal Grandmother     Heart disease Paternal Grandfather        Social History     Socioeconomic History    Marital status:      Spouse  name: Becac    Number of children: 2    Years of education: 12   Tobacco Use    Smoking status: Every Day     Current packs/day: 1.00     Average packs/day: 1 pack/day for 17.2 years (17.2 ttl pk-yrs)     Types: Cigars, Cigarettes     Start date: 4/11/2022     Passive exposure: Current    Smokeless tobacco: Never   Vaping Use    Vaping status: Never Used   Substance and Sexual Activity    Alcohol use: No    Drug use: No    Sexual activity: Defer     Partners: Female     Birth control/protection: None           Objective   Physical Exam  Vitals and nursing note reviewed.   Constitutional:       General: He is not in acute distress.     Appearance: He is well-developed. He is not diaphoretic.   HENT:      Head: Normocephalic and atraumatic.      Right Ear: External ear normal.      Left Ear: External ear normal.      Nose: Nose normal.      Mouth/Throat:      Mouth: Mucous membranes are moist.      Pharynx: Oropharynx is clear.   Eyes:      Conjunctiva/sclera: Conjunctivae normal.      Pupils: Pupils are equal, round, and reactive to light.   Neck:      Vascular: No JVD.      Trachea: No tracheal deviation.   Cardiovascular:      Rate and Rhythm: Normal rate and regular rhythm.      Heart sounds: Normal heart sounds. No murmur heard.  Pulmonary:      Effort: Pulmonary effort is normal. No respiratory distress.      Breath sounds: Normal breath sounds. No wheezing.   Abdominal:      General: Bowel sounds are normal.      Palpations: Abdomen is soft.      Tenderness: There is no abdominal tenderness.   Musculoskeletal:         General: No deformity. Normal range of motion.      Cervical back: Normal range of motion and neck supple.   Skin:     General: Skin is warm and dry.      Capillary Refill: Capillary refill takes less than 2 seconds.      Coloration: Skin is not pale.      Findings: No erythema or rash.   Neurological:      General: No focal deficit present.      Mental Status: He is alert and oriented to  person, place, and time.      Cranial Nerves: No cranial nerve deficit.   Psychiatric:         Mood and Affect: Mood normal.         Behavior: Behavior normal.         Thought Content: Thought content normal.         Judgment: Judgment normal.         Procedures       Results for orders placed or performed during the hospital encounter of 05/26/24   Comprehensive Metabolic Panel    Specimen: Blood   Result Value Ref Range    Glucose 99 65 - 99 mg/dL    BUN 13 6 - 20 mg/dL    Creatinine 1.10 0.76 - 1.27 mg/dL    Sodium 134 (L) 136 - 145 mmol/L    Potassium 4.5 3.5 - 5.2 mmol/L    Chloride 102 98 - 107 mmol/L    CO2 18.3 (L) 22.0 - 29.0 mmol/L    Calcium 9.2 8.6 - 10.5 mg/dL    Total Protein 8.0 6.0 - 8.5 g/dL    Albumin 4.3 3.5 - 5.2 g/dL    ALT (SGPT) 23 1 - 41 U/L    AST (SGOT) 36 1 - 40 U/L    Alkaline Phosphatase 98 39 - 117 U/L    Total Bilirubin 0.3 0.0 - 1.2 mg/dL    Globulin 3.7 gm/dL    A/G Ratio 1.2 g/dL    BUN/Creatinine Ratio 11.8 7.0 - 25.0    Anion Gap 13.7 5.0 - 15.0 mmol/L    eGFR 81.3 >60.0 mL/min/1.73   Urinalysis With Microscopic If Indicated (No Culture) - Urine, Clean Catch    Specimen: Urine, Clean Catch   Result Value Ref Range    Color, UA Yellow Yellow, Straw    Appearance, UA Clear Clear    pH, UA 5.5 5.0 - 8.0    Specific Gravity, UA 1.023 1.005 - 1.030    Glucose, UA Negative Negative    Ketones, UA Negative Negative    Bilirubin, UA Negative Negative    Blood, UA Negative Negative    Protein, UA Negative Negative    Leuk Esterase, UA Negative Negative    Nitrite, UA Negative Negative    Urobilinogen, UA 0.2 E.U./dL 0.2 - 1.0 E.U./dL   Lactic Acid, Plasma    Specimen: Blood   Result Value Ref Range    Lactate 1.3 0.5 - 2.0 mmol/L   Urine Drug Screen - Urine, Clean Catch    Specimen: Urine, Clean Catch   Result Value Ref Range    THC, Screen, Urine Negative Negative    Phencyclidine (PCP), Urine Negative Negative    Cocaine Screen, Urine Negative Negative    Methamphetamine, Ur Negative  Negative    Opiate Screen Negative Negative    Amphetamine Screen, Urine Negative Negative    Benzodiazepine Screen, Urine Positive (A) Negative    Tricyclic Antidepressants Screen Negative Negative    Methadone Screen, Urine Negative Negative    Barbiturates Screen, Urine Positive (A) Negative    Oxycodone Screen, Urine Negative Negative    Buprenorphine, Screen, Urine Negative Negative   Phenytoin Level, Total    Specimen: Blood   Result Value Ref Range    Phenytoin Level 13.0 10.0 - 20.0 mcg/mL   CBC Auto Differential    Specimen: Blood   Result Value Ref Range    WBC 8.03 3.40 - 10.80 10*3/mm3    RBC 4.85 4.14 - 5.80 10*6/mm3    Hemoglobin 15.8 13.0 - 17.7 g/dL    Hematocrit 44.9 37.5 - 51.0 %    MCV 92.6 79.0 - 97.0 fL    MCH 32.6 26.6 - 33.0 pg    MCHC 35.2 31.5 - 35.7 g/dL    RDW 12.9 12.3 - 15.4 %    RDW-SD 43.8 37.0 - 54.0 fl    MPV 9.7 6.0 - 12.0 fL    Platelets 171 140 - 450 10*3/mm3    Neutrophil % 60.1 42.7 - 76.0 %    Lymphocyte % 27.8 19.6 - 45.3 %    Monocyte % 11.0 5.0 - 12.0 %    Eosinophil % 0.5 0.3 - 6.2 %    Basophil % 0.4 0.0 - 1.5 %    Immature Grans % 0.2 0.0 - 0.5 %    Neutrophils, Absolute 4.83 1.70 - 7.00 10*3/mm3    Lymphocytes, Absolute 2.23 0.70 - 3.10 10*3/mm3    Monocytes, Absolute 0.88 0.10 - 0.90 10*3/mm3    Eosinophils, Absolute 0.04 0.00 - 0.40 10*3/mm3    Basophils, Absolute 0.03 0.00 - 0.20 10*3/mm3    Immature Grans, Absolute 0.02 0.00 - 0.05 10*3/mm3    nRBC 0.0 0.0 - 0.2 /100 WBC   Fentanyl, Urine - Urine, Clean Catch    Specimen: Urine, Clean Catch   Result Value Ref Range    Fentanyl, Urine Negative Negative   Green Top (Gel)   Result Value Ref Range    Extra Tube Hold for add-ons.    Lavender Top   Result Value Ref Range    Extra Tube hold for add-on    Gold Top - SST   Result Value Ref Range    Extra Tube Hold for add-ons.    Light Blue Top   Result Value Ref Range    Extra Tube Hold for add-ons.      *Note: Due to a large number of results and/or encounters for the  requested time period, some results have not been displayed. A complete set of results can be found in Results Review.      No orders to display        ED Course                                             Medical Decision Making  Problems Addressed:  Seizure: complicated acute illness or injury    Amount and/or Complexity of Data Reviewed  Labs: ordered.        Final diagnoses:   Seizure       ED Disposition  ED Disposition       ED Disposition   Discharge    Condition   Stable    Comment   --               Tiera Valenzuela, APRN  [address] [facility] [address]  [phone]    Call in 2 days           Medication List      No changes were made to your prescriptions during this visit.            Trudy Hernández, APRN  05/31/24 [id number]

## 2024-06-04 ENCOUNTER — HOSPITAL ENCOUNTER (OUTPATIENT)
Dept: NUCLEAR MEDICINE | Facility: HOSPITAL | Age: 54
Discharge: HOME | End: 2024-06-04
Payer: COMMERCIAL

## 2024-06-04 ENCOUNTER — HOSPITAL ENCOUNTER (OUTPATIENT)
Dept: CARDIOLOGY | Facility: HOSPITAL | Age: 54
Discharge: HOME | End: 2024-06-04
Payer: COMMERCIAL

## 2024-06-04 DIAGNOSIS — R07.9 CHEST PAIN, UNSPECIFIED TYPE: ICD-10-CM

## 2024-06-04 LAB
BH CV NUCLEAR PRIOR STUDY: 3
BH CV REST NUCLEAR ISOTOPE DOSE: 9.2 MCI
BH CV STRESS BP STAGE 1: NORMAL
BH CV STRESS COMMENTS STAGE 1: NORMAL
BH CV STRESS DOSE REGADENOSON STAGE 1: 0.4
BH CV STRESS DURATION MIN STAGE 1: 0
BH CV STRESS DURATION SEC STAGE 1: 10
BH CV STRESS HR STAGE 1: 97
BH CV STRESS NUCLEAR ISOTOPE DOSE: 28.1 MCI
BH CV STRESS PROTOCOL 1: NORMAL
BH CV STRESS RECOVERY BP: NORMAL MMHG
BH CV STRESS RECOVERY HR: 96 BPM
BH CV STRESS STAGE 1: 1
LV EF NUC BP: 50 %
MAXIMAL PREDICTED HEART RATE: 169 BPM
PERCENT MAX PREDICTED HR: 57.4 %
STRESS BASELINE BP: NORMAL MMHG
STRESS BASELINE HR: 71 BPM
STRESS PERCENT HR: 68 %
STRESS POST PEAK BP: NORMAL MMHG
STRESS POST PEAK HR: 97 BPM
STRESS TARGET HR: 144 BPM

## 2024-06-04 PROCEDURE — A9500 TC99M SESTAMIBI: HCPCS | Performed by: INTERNAL MEDICINE

## 2024-06-04 PROCEDURE — 78452 HT MUSCLE IMAGE SPECT MULT: CPT

## 2024-06-04 PROCEDURE — 0 TECHNETIUM SESTAMIBI: Performed by: INTERNAL MEDICINE

## 2024-06-04 PROCEDURE — 25010000002 REGADENOSON 0.4 MG/5ML SOLUTION: Performed by: INTERNAL MEDICINE

## 2024-06-04 PROCEDURE — 93018 CV STRESS TEST I&R ONLY: CPT | Performed by: INTERNAL MEDICINE

## 2024-06-04 PROCEDURE — 93017 CV STRESS TEST TRACING ONLY: CPT

## 2024-06-04 PROCEDURE — 78452 HT MUSCLE IMAGE SPECT MULT: CPT | Performed by: INTERNAL MEDICINE

## 2024-06-04 RX ORDER — REGADENOSON 0.08 MG/ML
0.4 INJECTION, SOLUTION INTRAVENOUS
Status: COMPLETED | OUTPATIENT
Start: 2024-06-04 | End: 2024-06-04

## 2024-06-04 RX ADMIN — TECHNETIUM TC 99M SESTAMIBI 1 DOSE: 1 INJECTION INTRAVENOUS at 09:29

## 2024-06-04 RX ADMIN — TECHNETIUM TC 99M SESTAMIBI 1 DOSE: 1 INJECTION INTRAVENOUS at 08:29

## 2024-06-04 RX ADMIN — REGADENOSON 0.4 MG: 0.08 INJECTION, SOLUTION INTRAVENOUS at 09:29

## 2024-06-12 ENCOUNTER — OFFICE VISIT (OUTPATIENT)
Dept: FAMILY MEDICINE CLINIC | Facility: CLINIC | Age: 54
End: 2024-06-12
Payer: COMMERCIAL

## 2024-06-12 VITALS
BODY MASS INDEX: 37.04 KG/M2 | RESPIRATION RATE: 16 BRPM | OXYGEN SATURATION: 98 % | WEIGHT: 236 LBS | DIASTOLIC BLOOD PRESSURE: 80 MMHG | SYSTOLIC BLOOD PRESSURE: 126 MMHG | HEART RATE: 87 BPM | HEIGHT: 67 IN

## 2024-06-12 DIAGNOSIS — H91.93 DECREASED HEARING OF BOTH EARS: Primary | ICD-10-CM

## 2024-06-12 DIAGNOSIS — F51.04 PSYCHOPHYSIOLOGICAL INSOMNIA: ICD-10-CM

## 2024-06-12 DIAGNOSIS — I10 ESSENTIAL HYPERTENSION: ICD-10-CM

## 2024-06-12 DIAGNOSIS — K59.09 OTHER CONSTIPATION: ICD-10-CM

## 2024-06-12 DIAGNOSIS — K21.9 GASTROESOPHAGEAL REFLUX DISEASE WITHOUT ESOPHAGITIS: ICD-10-CM

## 2024-06-12 PROCEDURE — 3074F SYST BP LT 130 MM HG: CPT | Performed by: NURSE PRACTITIONER

## 2024-06-12 PROCEDURE — 99214 OFFICE O/P EST MOD 30 MIN: CPT | Performed by: NURSE PRACTITIONER

## 2024-06-12 PROCEDURE — 3079F DIAST BP 80-89 MM HG: CPT | Performed by: NURSE PRACTITIONER

## 2024-06-12 PROCEDURE — 1125F AMNT PAIN NOTED PAIN PRSNT: CPT | Performed by: NURSE PRACTITIONER

## 2024-06-12 RX ORDER — TEMAZEPAM 15 MG/1
15 CAPSULE ORAL NIGHTLY PRN
Qty: 30 CAPSULE | Refills: 0 | Status: SHIPPED | OUTPATIENT
Start: 2024-06-12

## 2024-06-12 RX ORDER — DEXLANSOPRAZOLE 60 MG/1
60 CAPSULE, DELAYED RELEASE ORAL 2 TIMES DAILY
Qty: 180 CAPSULE | Refills: 5 | Status: SHIPPED | OUTPATIENT
Start: 2024-06-12

## 2024-06-12 NOTE — PROGRESS NOTES
"Subjective   Jaquan Mckay is a 53 y.o. male.     Chief Complaint   Patient presents with    Insomnia       Insomnia  Associated symptoms include arthralgias, fatigue and myalgias. Pertinent negatives include no abdominal pain, chest pain, congestion, coughing, joint swelling, nausea, numbness, sore throat or swollen glands.     Insomnia-ongoing.  On restoril and Remeron 30 mg.  No negative side effects.  He has chronic insomnia.   Left ankle pain-\"About the same\".  Has an appt 06/28 with dr Florez.   He continues to wear his brace.  He is done with PT.  He continues to have decreased sensation and weakness.  He may be having EMG study due to the sensation decrease.    GI problem-reports he has noted some rash on his arms each time he has tried fiber.  He also noted some constipation.  He reports he could not take Trulance because \"he started to break out\".  He reports that since he has stopped taking fiber he is having loose running stool.   Hearing concern-would like to have a new referral for audiology.  He did have one consult in [place] last year but he reports that did not ever get a follow up.    HTN-last CP was \"at the hospital\" couple of months ago.  Recent stress test with EF of 50%.  Abnormal LV wall motion consistent with Hypokinesis.  Currently on Lisinopril 30 mg.  No negative side effects.     The following portions of the patient's history were reviewed and updated as appropriate: CC, ROS, allergies, current medications, past family history, past medical history, past social history, past surgical history and problem list.      Review of Systems   Constitutional:  Positive for fatigue. Negative for appetite change, unexpected weight gain and unexpected weight loss.   HENT:  Negative for congestion, ear pain, postnasal drip, rhinorrhea, sore throat, swollen glands, trouble swallowing and voice change.    Eyes:  Negative for pain and visual disturbance.   Respiratory:  Negative for cough, chest tightness, " "shortness of breath and wheezing.    Cardiovascular:  Negative for chest pain, palpitations and leg swelling.   Gastrointestinal:  Negative for abdominal pain, blood in stool, constipation, diarrhea, nausea and indigestion.   Genitourinary:  Negative for dysuria, hematuria and urgency.   Musculoskeletal:  Positive for arthralgias, back pain and myalgias. Negative for gait problem and joint swelling.   Skin:  Negative for color change and skin lesions.   Allergic/Immunologic: Negative.    Neurological:  Negative for dizziness, numbness and headache.   Hematological: Negative.    Psychiatric/Behavioral:  Negative for dysphoric mood, sleep disturbance and suicidal ideas. The patient has insomnia. The patient is not nervous/anxious.    All other systems reviewed and are negative.      Objective     /80   Pulse 87   Resp 16   Ht 170.2 cm (67.01\")   Wt 107 kg (236 lb)   SpO2 98%   BMI 36.95 kg/m²     Physical Exam  Vitals reviewed.   Constitutional:       General: He is not in acute distress.     Appearance: He is well-developed. He is obese. He is not diaphoretic.   HENT:      Head: Normocephalic and atraumatic.      Jaw: No tenderness.      Right Ear: Hearing, tympanic membrane, ear canal and external ear normal.      Left Ear: Hearing, tympanic membrane, ear canal and external ear normal.      Nose: Nose normal. No nasal tenderness or congestion.      Right Sinus: No maxillary sinus tenderness or frontal sinus tenderness.      Left Sinus: No maxillary sinus tenderness or frontal sinus tenderness.      Mouth/Throat:      Lips: Pink.      Mouth: Mucous membranes are moist.      Pharynx: Oropharynx is clear. Uvula midline.   Eyes:      General: Lids are normal. No scleral icterus.     Extraocular Movements:      Right eye: Normal extraocular motion and no nystagmus.      Left eye: Normal extraocular motion and no nystagmus.      Conjunctiva/sclera: Conjunctivae normal.      Pupils: Pupils are equal, round, and " reactive to light.   Neck:      Thyroid: No thyromegaly or thyroid tenderness.      Vascular: No carotid bruit or JVD.      Trachea: No tracheal tenderness.   Cardiovascular:      Rate and Rhythm: Normal rate and regular rhythm.      Pulses:           Dorsalis pedis pulses are 2+ on the right side and 2+ on the left side.        Posterior tibial pulses are 2+ on the right side and 2+ on the left side.      Heart sounds: Normal heart sounds, S1 normal and S2 normal. No murmur heard.  Pulmonary:      Effort: Pulmonary effort is normal. No accessory muscle usage, prolonged expiration or respiratory distress.      Breath sounds: Normal breath sounds.   Chest:      Chest wall: No tenderness.   Abdominal:      General: Bowel sounds are normal. There is no distension.      Palpations: Abdomen is soft. There is no hepatomegaly, splenomegaly or mass.      Tenderness: There is no abdominal tenderness.   Musculoskeletal:         General: Tenderness present.      Cervical back: Normal range of motion and neck supple.      Thoracic back: Tenderness present.      Lumbar back: Tenderness present.      Right lower leg: No edema.      Left lower leg: No edema.      Left foot: Decreased range of motion. Normal capillary refill. Tenderness and bony tenderness present.      Comments: No muscular atrophy or flaccidity.   Lymphadenopathy:      Head:      Right side of head: No submental or submandibular adenopathy.      Left side of head: No submental or submandibular adenopathy.      Cervical: No cervical adenopathy.      Right cervical: No superficial cervical adenopathy.     Left cervical: No superficial cervical adenopathy.   Skin:     General: Skin is warm and dry.      Capillary Refill: Capillary refill takes less than 2 seconds.      Coloration: Skin is not jaundiced or pale.      Findings: No erythema.      Nails: There is no clubbing.   Neurological:      Mental Status: He is alert and oriented to person, place, and time.       Cranial Nerves: No cranial nerve deficit or facial asymmetry.      Sensory: No sensory deficit.      Motor: No weakness, tremor, atrophy or abnormal muscle tone.      Coordination: Coordination normal.      Gait: Gait abnormal (moderate antalgia).      Deep Tendon Reflexes: Reflexes are normal and symmetric.   Psychiatric:         Attention and Perception: He is attentive.         Mood and Affect: Mood normal. Mood is not anxious or depressed.         Speech: Speech normal.         Behavior: Behavior normal. Behavior is cooperative.         Thought Content: Thought content normal.         Cognition and Memory: Cognition normal.         Judgment: Judgment normal.         Diagnoses and all orders for this visit:    1. Decreased hearing of both ears (Primary)  -     Ambulatory Referral to Audiology    2. Gastroesophageal reflux disease without esophagitis  Comments:  Continue Dexilant 60 mg.  Avoid known food triggers  Assessment & Plan:  Continue Dexilant 60 mg  Advised to avoid known GI triggers such as spicy foods.  Upright 30 minutes after meals and avoid eating large meals.  Several small meals daily as able to avoid overfilling stomach.      Orders:  -     dexlansoprazole (Dexilant) 60 MG capsule; Take 1 capsule by mouth 2 (Two) Times a Day.  Dispense: 180 capsule; Refill: 5    3. Psychophysiological insomnia  Comments:  continue remeron and restoril  Overview:  With depression and anxiety      Assessment & Plan:  Continue Remeron.  Continue restoril   Continue to work on sleep Hygiene.    Orders:  -     temazepam (Restoril) 15 MG capsule; Take 1 capsule by mouth At Night As Needed for Sleep.  Dispense: 30 capsule; Refill: 0    4. Other constipation  Comments:  continue to work on bowel care.  Push fluids    5. Essential hypertension  Comments:  Continue under the care of Cardiology.  Continue Lisinopril 30 mg.       Understands disease processes and need for medications.  Understands reasons for urgent and  emergent care.  Patient (& family) verbalized agreement for treatment plan.   Emotional support and active listening provided.  Patient provided time to verbalize feelings.          This document has been electronically signed by:  BALDOMERO Thao FNP-C Dragon disclaimer:  Part of this note may be an electronic transcription/translation of spoken language to printed text using the Dragon Dictation System.

## 2024-06-20 ENCOUNTER — OFFICE VISIT (OUTPATIENT)
Dept: FAMILY MEDICINE CLINIC | Facility: CLINIC | Age: 54
End: 2024-06-20
Payer: COMMERCIAL

## 2024-06-20 ENCOUNTER — HOSPITAL ENCOUNTER (OUTPATIENT)
Facility: HOSPITAL | Age: 54
Discharge: HOME | End: 2024-06-20
Admitting: NURSE PRACTITIONER
Payer: COMMERCIAL

## 2024-06-20 VITALS
BODY MASS INDEX: 36.57 KG/M2 | SYSTOLIC BLOOD PRESSURE: 110 MMHG | DIASTOLIC BLOOD PRESSURE: 70 MMHG | RESPIRATION RATE: 14 BRPM | OXYGEN SATURATION: 98 % | HEART RATE: 77 BPM | TEMPERATURE: 97.8 F | WEIGHT: 233 LBS | HEIGHT: 67 IN

## 2024-06-20 DIAGNOSIS — K21.9 GASTROESOPHAGEAL REFLUX DISEASE WITHOUT ESOPHAGITIS: Chronic | ICD-10-CM

## 2024-06-20 DIAGNOSIS — R30.0 DYSURIA: ICD-10-CM

## 2024-06-20 DIAGNOSIS — R19.7 DIARRHEA, UNSPECIFIED TYPE: Primary | Chronic | ICD-10-CM

## 2024-06-20 DIAGNOSIS — R11.0 NAUSEA: ICD-10-CM

## 2024-06-20 DIAGNOSIS — R10.30 LOWER ABDOMINAL PAIN: ICD-10-CM

## 2024-06-20 DIAGNOSIS — R19.7 DIARRHEA, UNSPECIFIED TYPE: Chronic | ICD-10-CM

## 2024-06-20 LAB
ADV 40+41 DNA STL QL NAA+NON-PROBE: NOT DETECTED
ASTRO TYP 1-8 RNA STL QL NAA+NON-PROBE: NOT DETECTED
BILIRUB BLD-MCNC: NEGATIVE MG/DL
C CAYETANENSIS DNA STL QL NAA+NON-PROBE: NOT DETECTED
C COLI+JEJ+UPSA DNA STL QL NAA+NON-PROBE: NOT DETECTED
CLARITY, POC: CLEAR
COLOR UR: YELLOW
CRYPTOSP DNA STL QL NAA+NON-PROBE: NOT DETECTED
E HISTOLYT DNA STL QL NAA+NON-PROBE: NOT DETECTED
EAEC PAA PLAS AGGR+AATA ST NAA+NON-PRB: NOT DETECTED
EC STX1+STX2 GENES STL QL NAA+NON-PROBE: NOT DETECTED
EPEC EAE GENE STL QL NAA+NON-PROBE: NOT DETECTED
ETEC LTA+ST1A+ST1B TOX ST NAA+NON-PROBE: NOT DETECTED
EXPIRATION DATE: NORMAL
G LAMBLIA DNA STL QL NAA+NON-PROBE: NOT DETECTED
GLUCOSE UR STRIP-MCNC: NEGATIVE MG/DL
KETONES UR QL: NEGATIVE
LEUKOCYTE EST, POC: NEGATIVE
Lab: NORMAL
NITRITE UR-MCNC: NEGATIVE MG/ML
NOROVIRUS GI+II RNA STL QL NAA+NON-PROBE: NOT DETECTED
P SHIGELLOIDES DNA STL QL NAA+NON-PROBE: NOT DETECTED
PH UR: 6 [PH] (ref 5–8)
PROT UR STRIP-MCNC: NEGATIVE MG/DL
RBC # UR STRIP: NEGATIVE /UL
RVA RNA STL QL NAA+NON-PROBE: NOT DETECTED
S ENT+BONG DNA STL QL NAA+NON-PROBE: NOT DETECTED
SAPO I+II+IV+V RNA STL QL NAA+NON-PROBE: NOT DETECTED
SHIGELLA SP+EIEC IPAH ST NAA+NON-PROBE: NOT DETECTED
SP GR UR: 1.02 (ref 1–1.03)
UROBILINOGEN UR QL: NORMAL
V CHOL+PARA+VUL DNA STL QL NAA+NON-PROBE: NOT DETECTED
V CHOLERAE DNA STL QL NAA+NON-PROBE: NOT DETECTED
Y ENTEROCOL DNA STL QL NAA+NON-PROBE: NOT DETECTED

## 2024-06-20 PROCEDURE — 74018 RADEX ABDOMEN 1 VIEW: CPT

## 2024-06-20 PROCEDURE — 80053 COMPREHEN METABOLIC PANEL: CPT | Performed by: NURSE PRACTITIONER

## 2024-06-20 PROCEDURE — 87086 URINE CULTURE/COLONY COUNT: CPT | Performed by: NURSE PRACTITIONER

## 2024-06-20 PROCEDURE — 83735 ASSAY OF MAGNESIUM: CPT | Performed by: NURSE PRACTITIONER

## 2024-06-20 PROCEDURE — 85025 COMPLETE CBC W/AUTO DIFF WBC: CPT | Performed by: NURSE PRACTITIONER

## 2024-06-20 PROCEDURE — 87507 IADNA-DNA/RNA PROBE TQ 12-25: CPT | Performed by: NURSE PRACTITIONER

## 2024-06-20 RX ORDER — ONDANSETRON 4 MG/1
4 TABLET, FILM COATED ORAL EVERY 8 HOURS PRN
Qty: 20 TABLET | Refills: 0 | Status: SHIPPED | OUTPATIENT
Start: 2024-06-20

## 2024-06-20 RX ORDER — DICYCLOMINE HYDROCHLORIDE 10 MG/1
10 CAPSULE ORAL
Qty: 30 CAPSULE | Refills: 0 | Status: SHIPPED | OUTPATIENT
Start: 2024-06-20

## 2024-06-20 NOTE — PROGRESS NOTES
"      History of Present Illness  Jaquan Mckay is a 53 y.o. male who presents to the clinic today complaining diarrhea which he has had on and off for approximately 2 years but had worsened over the last 2 to 3 days.  Has been diagnosed with diverticulosis in the right and left colon.  In addition, he is complaining of dysuria today    GI Problem  Reports over the last 2 days he has had 5 episodes of diarrhea which is green and watery.  He awakened this morning at 5:00 due to diarrhea.  He has associated fecal urgency particularly after eating.  Previously he had an issue with constipation.  On 4/12/2024 a KUB showed a large stool burden and at that time was likely having overflow diarrhea from constipation.  The primary symptoms include fatigue, abdominal pain (cramping , nausea, diarrhea and dysuria. Primary symptoms do not include fever, vomiting, hematemesis, jaundice, hematochezia or rash. The illness does not include chills, bloating, constipation or itching.     The following portions of the patient's history were reviewed and updated as appropriate: allergies, current medications, past family history, past medical history, past social history, past surgical history and problem list.    Review of Systems   Constitutional:  Positive for appetite change and fatigue. Negative for activity change, chills and fever.   HENT:  Negative for congestion.    Eyes:  Positive for visual disturbance.   Respiratory:  Negative for cough, shortness of breath and wheezing.    Cardiovascular:  Negative for chest pain, palpitations and leg swelling.   Gastrointestinal:  Positive for abdominal pain (\"cramping bad\"), diarrhea and nausea. Negative for blood in stool, constipation and vomiting.   Genitourinary:  Positive for decreased urine volume and dysuria. Negative for difficulty urinating.   Skin:  Negative for color change and rash.   Allergic/Immunologic: Positive for environmental allergies and food allergies. " "  Neurological:  Positive for weakness. Negative for dizziness.   Hematological:  Negative for adenopathy.   Psychiatric/Behavioral:  Positive for sleep disturbance (Due to diarrhea).      Vital signs:  /70 (BP Location: Right arm, Patient Position: Sitting, Cuff Size: Adult)   Pulse 77   Temp 97.8 °F (36.6 °C) (Temporal)   Resp 14   Ht 170.2 cm (67.01\")   Wt 106 kg (233 lb)   SpO2 98%   BMI 36.48 kg/m²     Physical Exam  Vitals and nursing note reviewed.   Constitutional:       General: He is not in acute distress.     Appearance: He is well-developed.   HENT:      Head: Normocephalic.      Nose: Nose normal.      Mouth/Throat:      Mouth: Mucous membranes are moist.   Eyes:      General: No scleral icterus.        Right eye: No discharge.         Left eye: No discharge.      Conjunctiva/sclera: Conjunctivae normal.   Cardiovascular:      Rate and Rhythm: Normal rate and regular rhythm.      Heart sounds: Normal heart sounds. No murmur heard.     No friction rub.   Pulmonary:      Effort: Pulmonary effort is normal. No respiratory distress.      Breath sounds: Normal breath sounds. No decreased breath sounds, wheezing, rhonchi or rales.   Abdominal:      General: There is no distension.      Palpations: Abdomen is soft.      Tenderness: There is generalized abdominal tenderness. There is no right CVA tenderness, left CVA tenderness or guarding.   Musculoskeletal:      Cervical back: Neck supple.   Lymphadenopathy:      Cervical: No cervical adenopathy.   Skin:     General: Skin is warm and dry.      Capillary Refill: Capillary refill takes less than 2 seconds.      Findings: No erythema or rash.   Neurological:      Mental Status: He is alert and oriented to person, place, and time.      Cranial Nerves: Cranial nerves 2-12 are intact.   Psychiatric:         Behavior: Behavior is cooperative.         Thought Content: Thought content normal.       Result Review :  PCP office note 06/12/2024               "                Gastroenterology office note 4/25/2024                             Previous GI panel and KUB  Results for orders placed or performed in visit on 06/20/24   POC Urinalysis Dipstick, Automated    Specimen: Urine   Result Value Ref Range    Color Yellow Yellow, Straw, Dark Yellow, Madiha    Clarity, UA Clear Clear    Specific Gravity  1.025 1.005 - 1.030    pH, Urine 6.0 5.0 - 8.0    Leukocytes Negative Negative    Nitrite, UA Negative Negative    Protein, POC Negative Negative mg/dL    Glucose, UA Negative Negative mg/dL    Ketones, UA Negative Negative    Urobilinogen, UA 0.2 E.U./dL Normal, 0.2 E.U./dL    Bilirubin Negative Negative    Blood, UA Negative Negative    Lot Number [id number]     Expiration Date 10-25-24      *Note: Due to a large number of results and/or encounters for the requested time period, some results have not been displayed. A complete set of results can be found in Results Review.      Class 2 Severe Obesity (BMI >=35 and <=39.9). Obesity-related health conditions include the following: hypertension, dyslipidemias, and GERD. Obesity is unchanged. BMI is is above average; BMI management plan is completed. We discussed Information on healthy weight added to patient's after visit summary.    Assessment & Plan     Diagnoses and all orders for this visit:    1. Diarrhea, unspecified type (Primary)  Comments:  Worsening, GI panel ordered today  Orders:  -     CBC & Differential  -     Comprehensive Metabolic Panel  -     Magnesium  -     Gastrointestinal Panel, PCR - Stool, Per Rectum  -     XR Abdomen KUB; Future    2. Gastroesophageal reflux disease without esophagitis  Comments:  Continue Dexilant.  Orders:  -     CBC & Differential    3. Dysuria  Comments:  UA and urine culture completed today  Orders:  -     POC Urinalysis Dipstick, Automated  -     Urine Culture - Urine, Urine, Clean Catch    4. Lower abdominal pain  Comments:  KUB ordered.  Results will be discussed with him  when available.  Bentyl 10 mg 4 times daily short-term if KUB is negative for stool burden  Orders:  -     XR Abdomen KUB; Future  -     dicyclomine (BENTYL) 10 MG capsule; Take 1 capsule by mouth 4 (Four) Times a Day Before Meals & at Bedtime.  Dispense: 30 capsule; Refill: 0    5. Nausea  Comments:  Zofran 4 mg prescribed  Orders:  -     ondansetron (Zofran) 4 MG tablet; Take 1 tablet by mouth Every 8 (Eight) Hours As Needed for Nausea.  Dispense: 20 tablet; Refill: 0    I spent 55 minutes caring for Jaquan on this date of service. This time includes time spent by me in the following activities:preparing for the visit, reviewing tests, obtaining and/or reviewing a separately obtained history, performing a medically appropriate examination and/or evaluation , counseling and educating the patient/family/caregiver, ordering medications, tests, or procedures, documenting information in the medical record, and independently interpreting results and communicating that information with the patient/family/caregiver    Follow Up If symptoms worsen or do not improve    Findings and recommendations discussed with Jaquan. Reviewed treatment options.  Counseled regarding supportive care measures.  Signs and symptoms of concern reviewed and if occur to seek further evaluation or if symptoms worsen or do not improve.  Reminded him of his gastroenterology appointment for June 28 and importance to keep that appointment.  Jaquan was given instructions and counseling regarding his condition or for health maintenance advice. Please see specific information pulled into the AVS if appropriate.      This document has been electronically signed by:

## 2024-06-21 LAB
ALBUMIN SERPL-MCNC: 4.2 G/DL (ref 3.5–5.2)
ALBUMIN/GLOB SERPL: 1.1 G/DL
ALP SERPL-CCNC: 97 U/L (ref 39–117)
ALT SERPL W P-5'-P-CCNC: 26 U/L (ref 1–41)
ANION GAP SERPL CALCULATED.3IONS-SCNC: 12.3 MMOL/L (ref 5–15)
AST SERPL-CCNC: 32 U/L (ref 1–40)
BACTERIA SPEC AEROBE CULT: NO GROWTH
BASOPHILS # BLD AUTO: 0.02 10*3/MM3 (ref 0–0.2)
BASOPHILS NFR BLD AUTO: 0.3 % (ref 0–1.5)
BILIRUB SERPL-MCNC: 0.4 MG/DL (ref 0–1.2)
BUN SERPL-MCNC: 14 MG/DL (ref 6–20)
BUN/CREAT SERPL: 12.8 (ref 7–25)
CALCIUM SPEC-SCNC: 8.9 MG/DL (ref 8.6–10.5)
CHLORIDE SERPL-SCNC: 102 MMOL/L (ref 98–107)
CO2 SERPL-SCNC: 21.7 MMOL/L (ref 22–29)
CREAT SERPL-MCNC: 1.09 MG/DL (ref 0.76–1.27)
DEPRECATED RDW RBC AUTO: 44.4 FL (ref 37–54)
EGFRCR SERPLBLD CKD-EPI 2021: 82.2 ML/MIN/1.73
EOSINOPHIL # BLD AUTO: 0.07 10*3/MM3 (ref 0–0.4)
EOSINOPHIL NFR BLD AUTO: 1.2 % (ref 0.3–6.2)
ERYTHROCYTE [DISTWIDTH] IN BLOOD BY AUTOMATED COUNT: 13 % (ref 12.3–15.4)
GLOBULIN UR ELPH-MCNC: 3.7 GM/DL
GLUCOSE SERPL-MCNC: 90 MG/DL (ref 65–99)
HCT VFR BLD AUTO: 45.9 % (ref 37.5–51)
HGB BLD-MCNC: 16.2 G/DL (ref 13–17.7)
IMM GRANULOCYTES # BLD AUTO: 0.01 10*3/MM3 (ref 0–0.05)
IMM GRANULOCYTES NFR BLD AUTO: 0.2 % (ref 0–0.5)
LYMPHOCYTES # BLD AUTO: 1.68 10*3/MM3 (ref 0.7–3.1)
LYMPHOCYTES NFR BLD AUTO: 29.4 % (ref 19.6–45.3)
MAGNESIUM SERPL-MCNC: 2.1 MG/DL (ref 1.6–2.6)
MCH RBC QN AUTO: 33.1 PG (ref 26.6–33)
MCHC RBC AUTO-ENTMCNC: 35.3 G/DL (ref 31.5–35.7)
MCV RBC AUTO: 93.9 FL (ref 79–97)
MONOCYTES # BLD AUTO: 0.71 10*3/MM3 (ref 0.1–0.9)
MONOCYTES NFR BLD AUTO: 12.4 % (ref 5–12)
NEUTROPHILS NFR BLD AUTO: 3.23 10*3/MM3 (ref 1.7–7)
NEUTROPHILS NFR BLD AUTO: 56.5 % (ref 42.7–76)
NRBC BLD AUTO-RTO: 0 /100 WBC (ref 0–0.2)
PLATELET # BLD AUTO: 214 10*3/MM3 (ref 140–450)
PMV BLD AUTO: 11.8 FL (ref 6–12)
POTASSIUM SERPL-SCNC: 4.4 MMOL/L (ref 3.5–5.2)
PROT SERPL-MCNC: 7.9 G/DL (ref 6–8.5)
RBC # BLD AUTO: 4.89 10*6/MM3 (ref 4.14–5.8)
SODIUM SERPL-SCNC: 136 MMOL/L (ref 136–145)
WBC NRBC COR # BLD AUTO: 5.72 10*3/MM3 (ref 3.4–10.8)

## 2024-06-26 DIAGNOSIS — R56.9 SEIZURES: ICD-10-CM

## 2024-06-26 NOTE — TELEPHONE ENCOUNTER
Patient called saying that his pharmacy cannot get anymore of the brand name of Dilantin in stock. I called around to different pharmacies and found it at [facility] [place] pharmacy. They said to have a sent prescription sent and they can order it for him. I sent this request to [facility] since [facility] is out of the office. Patient was fine with this being sent there as well.

## 2024-06-27 RX ORDER — PHENYTOIN SODIUM 100 MG/1
200 CAPSULE, EXTENDED RELEASE ORAL 2 TIMES DAILY
Qty: 120 CAPSULE | Refills: 0 | Status: SHIPPED | OUTPATIENT
Start: 2024-06-27

## 2024-06-28 ENCOUNTER — OFFICE VISIT (OUTPATIENT)
Dept: GASTROENTEROLOGY | Facility: CLINIC | Age: 54
End: 2024-06-28
Payer: COMMERCIAL

## 2024-06-28 VITALS — BODY MASS INDEX: 36.49 KG/M2 | HEIGHT: 67 IN | SYSTOLIC BLOOD PRESSURE: 132 MMHG | DIASTOLIC BLOOD PRESSURE: 96 MMHG

## 2024-06-28 DIAGNOSIS — R11.0 NAUSEA: Primary | ICD-10-CM

## 2024-06-28 DIAGNOSIS — R14.0 BLOATING: ICD-10-CM

## 2024-06-28 DIAGNOSIS — R19.7 DIARRHEA DUE TO MALABSORPTION: ICD-10-CM

## 2024-06-28 DIAGNOSIS — K90.9 DIARRHEA DUE TO MALABSORPTION: ICD-10-CM

## 2024-06-28 RX ORDER — MONTELUKAST SODIUM 4 MG/1
1 TABLET, CHEWABLE ORAL DAILY
Qty: 30 TABLET | Refills: 2 | Status: SHIPPED | OUTPATIENT
Start: 2024-06-28

## 2024-06-28 NOTE — PROGRESS NOTES
"Chief Complaint  Abdominal Cramping and Nausea    Jaquan Mckay is a 53 y.o. male who presents today to [facility] for Abdominal Cramping and Nausea.    HPI:   53 y.o. male who is being seen today at the request of BALDOMERO Thao for evaluation and treatment of constipation.  In review of his records, he was previously evaluated by Marisela Luong PA-C and was last seen in 2022.  Patient has history of constipation and has previously tried over-the-counter stool softeners/laxatives and Amitiza without improvement.  With Linzess, he reports allergic reaction with rash.  Of note, he is s/p cholecystectomy and at one point he was struggling with diarrhea and was placed on Colestid and cholestyramine powder which patient says he is not currently taking.  At present, he complains of diarrhea and reports having 4-5 BMs per day with stool type IV or VII on [place] stool scale.  He has associated fecal urgency particularly after eating.  He also complains of intermittent lower abdominal pain/cramping and abdominal bloating.  Patient had recent KUB on 4/12/2024 which revealed large stool burden which was reviewed with patient in clinic and discussed how he is likely having overflow diarrhea from constipation.  Of note, he had previous colonoscopy in August 2021 which revealed diverticulosis in the right and left colon.  He also had 1 colon polyp removed which was a tubular adenoma.  Repeat colonoscopy was recommended in 5 years.  He has no other complaints today.         Interval History:    Today, patient states that he did not  the Trulance or the Citrucel/Metamucil fiber supplement that was recommended by Hanna ALEXANDRE at last visit.  He is poor historian.  He reports that his cousin called him on the way home from the visit and told him that these medications would interact and he would have an allergic reaction.  He states that \"ma'am you do not " "understand that I get hives\" when asked why he felt his medications would interact.  Reports that he is not constipated that he is having diarrhea 3-4 times each day.  Patient was previously on Colestid but states that he never took this medication either.  He reports that he has abdominal pain, bloating, and nausea after eating.  Describes this pain as cramping, located underneath his umbilicus, and is worse after eating or before eating.  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  He reports that this was done years ago and he does not remember when.  He denies unintentional weight loss, vomiting, constipation, melena, and hematochezia.    Previous History:   Past Medical History:   Diagnosis Date    Allergic     Anxiety     Arthritis     Asthma     Body piercing     REPORTS CYLICONE IN EARS    Clotting disorder 2004    had a knee surgery    Coronary artery disease     Depression     DVT (deep venous thrombosis)     RIGHT RIGHT KNEE AFTER SURGERY YEARS AGO IN 2001 OR 2004    Elevated cholesterol     Gastric ulcer     GERD (gastroesophageal reflux disease)     H/O migraine     Headache     Heart attack     REPORTS \"LIGHT HEART ATTACK A LONG TIME AGO\"  \"EARLY 90'S\"    History of seizures     REPORTS LAST EPISODE WAS AROUND 1995.    Hostility     Hyperlipidemia     Hypertension     Knee pain, acute     Left    Low back pain     Lyme disease     Migraine     MRSA (methicillin resistant Staphylococcus aureus)     REPORTS LAST TESTED + 2004. WAS TREATED HE REPORTS.  RIGHT ARM, RIGHT KNEE.    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     Obesity     Poor historian     [place] spotted fever     Seizures     Sleep apnea     Tattoo     Wears glasses       Past Surgical History:   Procedure Laterality Date    ABDOMINAL SURGERY      BACK SURGERY      BRAIN SURGERY  1986    Tumor removal     CARDIAC CATHETERIZATION N/A 9/28/2018    Procedure: Left Heart Cath;  " Surgeon: Leandro Daily MD;  Location: [facility] CATH INVASIVE LOCATION;  Service: Cardiology    CHOLECYSTECTOMY      COLONOSCOPY      COLONOSCOPY N/A 8/2/2021    Procedure: COLONOSCOPY FOR SCREENING;  Surgeon: Irving Azar MD;  Location: [facility] OR;  Service: Gastroenterology;  Laterality: N/A;    CYST REMOVAL      pilonidal cyst    ELBOW EPICONDYLECTOMY Right 7/23/2020    Procedure: LATERAL EPICONDYLAR RELEASE;  Surgeon: Jose Ruiz MD;  Location: [facility] OR;  Service: Orthopedics;  Laterality: Right;    ENDOSCOPY      ENDOSCOPY N/A 8/2/2021    Procedure: ESOPHAGOGASTRODUODENOSCOPY WITH BIOPSY;  Surgeon: Irving Azar MD;  Location: [facility] OR;  Service: Gastroenterology;  Laterality: N/A;  esophageal dilatation to 20mm    FRACTURE SURGERY Right     elbow    KNEE ARTHROSCOPY Left 10/20/2017    Procedure: Diagnostic arthroscopy left knee with chondroplasty;  Surgeon: Marco Aguirre MD;  Location: [facility] OR;  Service:     KNEE ARTHROSCOPY Left 1/11/2021    Procedure: KNEE DIAGNOSTIC ARTHROSCOPY WITH  CHONDROPLASTY patella, femoral and medial;  Surgeon: Raul Eagle MD;  Location: [facility] OR;  Service: Orthopedics;  Laterality: Left;    KNEE SURGERY Right     MOUTH SURGERY      FULL MOUTH EXTRACTION    OTHER SURGICAL HISTORY      REPORTS 7 TICKS REMOVED FROM RIGHT ARM IN 2001 OR 2002    TENNIS ELBOW RELEASE Right 7/23/2020    Procedure: RIGHT TENNIS ELBOW RELEASE;  Surgeon: Jose Ruiz MD;  Location: [facility] OR;  Service: Orthopedics;  Laterality: Right;    TUMOR EXCISION      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      Social History     Socioeconomic History    Marital status:      Spouse name: Becca    Number of children: 2    Years of education: 12   Tobacco Use    Smoking status: Every Day     Current packs/day: 1.00     Average packs/day: 1 pack/day for 17.3 years (17.3 ttl pk-yrs)     Types: Cigars, Cigarettes     Start date: 4/11/2022      Passive exposure: Current    Smokeless tobacco: Never   Vaping Use    Vaping status: Never Used   Substance and Sexual Activity    Alcohol use: No    Drug use: No    Sexual activity: Defer     Partners: Female     Birth control/protection: None        Current Medications:  Current Outpatient Medications   Medication Sig Dispense Refill    acetaminophen (TYLENOL) 500 MG tablet Take 1 tablet by mouth Every 6 (Six) Hours As Needed for Mild Pain. 30 tablet 2    albuterol sulfate  (90 Base) MCG/ACT inhaler       Alcohol Swabs (Alcohol Wipes) 70 % pads 1 each 2 (Two) Times a Day. 100 each 5    aspirin 81 MG EC tablet Take 1 tablet by mouth Daily. 90 tablet 2    azelastine (ASTELIN) 0.1 % nasal spray       calcium polycarbophil (FiberCon) 625 MG tablet Take 1 tablet by mouth 2 (Two) Times a Day. 60 tablet 3    clobetasol (TEMOVATE) 0.05 % ointment Apply 1 application topically to the appropriate area as directed 2 (Two) Times a Day. 60 g 2    dexlansoprazole (Dexilant) 60 MG capsule Take 1 capsule by mouth 2 (Two) Times a Day. 180 capsule 5    dicyclomine (BENTYL) 10 MG capsule Take 1 capsule by mouth 4 (Four) Times a Day Before Meals & at Bedtime. 30 capsule 0    Dilantin 100 MG capsule Take 2 capsules by mouth 2 (Two) Times a Day. 120 capsule 0    diphenhydrAMINE (Benadryl Allergy) 25 MG tablet Take 1-2 tablets by mouth Every 8 (Eight) Hours As Needed for Itching (or rash). 120 tablet 2    Elastic Bandages & Supports (ACE Ankle Brace) misc 1 each Daily. 1 each 0    fluticasone (Flovent HFA) 110 MCG/ACT inhaler Inhale 1 puff 2 (Two) Times a Day. 12 g 1    guaiFENesin (Mucinex) 600 MG 12 hr tablet Take 2 tablets by mouth 2 (Two) Times a Day. 20 tablet 0    lisinopril (PRINIVIL,ZESTRIL) 30 MG tablet Take 1 tablet by mouth Daily. FOR BLOOD PRESSURE 90 tablet 2    loratadine (CLARITIN) 10 MG tablet Take 1 tablet by mouth Daily As Needed for Allergies. 90 tablet 2    mirtazapine (REMERON) 30 MG tablet Take 1.5 tablets  "by mouth Every Night. 45 tablet 5    montelukast (SINGULAIR) 10 MG tablet Take 1 tablet by mouth.      multivitamin with minerals tablet tablet Take 1 tablet by mouth Daily. 30 tablet 12    ondansetron (Zofran) 4 MG tablet Take 1 tablet by mouth Every 8 (Eight) Hours As Needed for Nausea. 20 tablet 0    rosuvastatin (CRESTOR) 40 MG tablet Take 1 tablet by mouth Daily. 90 tablet 2    senna (Senokot) 8.6 MG tablet Take 2 tablets by mouth 2 (Two) Times a Day. 120 tablet 0    tamsulosin (FLOMAX) 0.4 MG capsule 24 hr capsule Take 1 capsule by mouth Daily. 90 capsule 3    temazepam (Restoril) 15 MG capsule Take 1 capsule by mouth At Night As Needed for Sleep. 30 capsule 0    TiZANidine (Zanaflex) 4 MG capsule Take 1 capsule by mouth 3 (Three) Times a Day. 30 capsule 5    vitamin D (ERGOCALCIFEROL) 1.25 MG (07279 UT) capsule capsule Take 1 capsule by mouth Every 7 (Seven) Days. 12 capsule 2    colestipol (COLESTID) 1 g tablet Take 1 tablet by mouth Daily. 30 tablet 2     No current facility-administered medications for this visit.       Allergies:   Allergies   Allergen Reactions    Ciprofloxacin Anaphylaxis and Hives    Miralax [Polyethylene Glycol] Itching and Rash    Mobic [Meloxicam] Other (See Comments)     Pt states, \"It make my feet and hands go numb and I can't hardly walk.\"     Paxil [Paroxetine Hcl] Shortness Of Breath     Chest pain     Peanut-Containing Drug Products Anaphylaxis    Penicillins Anaphylaxis    Pristiq [Desvenlafaxine Succinate Er] Dizziness    Sulfa Antibiotics Anaphylaxis, Itching and Rash    Doxycycline Hives    Fish-Derived Products Hives    Isosorbide Nitrate Rash     Rash, hives, had to use inhaler.     Movantik [Naloxegol] Rash    Seroquel [Quetiapine] Hives and Rash    Trulance [Plecanatide] Hives    Buspar [Buspirone] Rash    Clarithromycin Rash    Clindamycin/Lincomycin Rash    Codeine Rash    Contrast Dye (Echo Or Unknown Ct/Mr) Itching and Rash    Diltiazem Rash    Flomax [Tamsulosin] " "Hives    Gabapentin Rash    Iodinated Contrast Media Itching and Rash    Keflex [Cephalexin] Rash    Levocetirizine Rash    Linzess [Linaclotide] Rash    Metoprolol Rash    Prednisone Rash and Other (See Comments)     Face, feet, and legs go completely numb per patient    Robitussin Cough+ Chest Max St [Dextromethorphan-Guaifenesin] Itching    Shrimp (Diagnostic) Rash    Spironolactone Rash    Viibryd [Vilazodone Hcl] Itching and Rash    Zoloft [Sertraline Hcl] Hives and Itching       Vitals:   /96 (BP Location: Left arm, Patient Position: Sitting, Cuff Size: Adult)   Ht 170.2 cm (67\")   BMI 36.49 kg/m²   Estimated body mass index is 36.49 kg/m² as calculated from the following:    Height as of this encounter: 170.2 cm (67\").    Weight as of 6/20/24: 106 kg (233 lb).    ROS:   Review of Systems   Constitutional: Negative.    HENT: Negative.     Respiratory: Negative.     Cardiovascular: Negative.    Gastrointestinal: Negative.    All other systems reviewed and are negative.       Physical Exam:   Physical Exam  Vitals reviewed.   Constitutional:       Appearance: Normal appearance. He is obese.   HENT:      Head: Normocephalic and atraumatic.      Nose: Nose normal.      Mouth/Throat:      Mouth: Mucous membranes are moist.   Cardiovascular:      Rate and Rhythm: Normal rate and regular rhythm.      Pulses: Normal pulses.      Heart sounds: Normal heart sounds.   Pulmonary:      Effort: Pulmonary effort is normal.      Breath sounds: Normal breath sounds.   Abdominal:      General: Abdomen is flat. Bowel sounds are normal. There is no distension.      Palpations: Abdomen is soft. There is no mass.      Tenderness: There is no abdominal tenderness. There is no guarding or rebound.      Hernia: No hernia is present.   Neurological:      Mental Status: He is alert and oriented to person, place, and time.   Psychiatric:         Mood and Affect: Mood normal.          Lab Results:   Lab Results   Component Value " Date    WBC 5.72 06/20/2024    HGB 16.2 06/20/2024    HCT 45.9 06/20/2024    MCV 93.9 06/20/2024    RDW 13.0 06/20/2024     06/20/2024    NEUTRORELPCT 56.5 06/20/2024    LYMPHORELPCT 29.4 06/20/2024    MONORELPCT 12.4 (H) 06/20/2024    EOSRELPCT 1.2 06/20/2024    BASORELPCT 0.3 06/20/2024    NEUTROABS 3.23 06/20/2024    LYMPHSABS 1.68 06/20/2024       Lab Results   Component Value Date     06/20/2024    K 4.4 06/20/2024    CO2 21.7 (L) 06/20/2024     06/20/2024    BUN 14 06/20/2024    CREATININE 1.09 06/20/2024    EGFRIFNONA 66 02/02/2022    EGFRIFAFRI  08/26/2016      Comment:      <15 Indicative of kidney failure.    GLUCOSE 90 06/20/2024    CALCIUM 8.9 06/20/2024    ALKPHOS 97 06/20/2024    AST 32 06/20/2024    ALT 26 06/20/2024    BILITOT 0.4 06/20/2024    ALBUMIN 4.2 06/20/2024    PROTEINTOT 7.9 06/20/2024    MG 2.1 06/20/2024       Pathology:        Endoscopy:        Imaging:  XR Abdomen KUB    Result Date: 6/20/2024  No evidence of bowel obstruction   This report was finalized on 6/20/2024 1:04 PM by Dr. Sotero Fleming MD.      XR ANKLE LEFT 3 VIEWS Weight Bearing    Result Date: 4/25/2024  Mild degenerative change left ankle    CT Chest Without Contrast Diagnostic    Result Date: 4/15/2024   1.  No thoracic aortic aneurysm. 2.  Normal heart size. 3.  No mediastinal or hilar adenopathy. 4.  No lobar consolidation or edema. 5.  No pleural effusion or pneumothorax.   This report was finalized on 4/15/2024 9:50 PM by Marco Nieto MD.      XR Chest 1 View    Result Date: 4/15/2024  No acute cardiopulmonary process.   This report was finalized on 4/15/2024 5:39 PM by Alex Pallas, DO.      XR Abdomen KUB    Result Date: 4/12/2024  Large stool burden   This report was finalized on 4/12/2024 10:36 AM by Dr. Sotero Fleming MD.            Results review: During today's encounter, all relevant clinical data has been reviewed.      Assessment and Plan    1. Nausea (Primary)  2. Bloating  Given ongoing  abdominal pain, nausea, bloating and with history of significant abdominal surgery, will obtain gastric emptying scan to evaluate for possible gastroparesis.  -     NM Gastric Emptying; Future    3. Diarrhea due to malabsorption  Patient is s/p cholecystectomy.  He had recent KUB she ordered by Dr. Daily was not notable for significant stool burden.  Will initiate Colestid for possible bile acid diarrhea.  -     colestipol (COLESTID) 1 g tablet; Take 1 tablet by mouth Daily.  Dispense: 30 tablet; Refill: 2    New Medications:   New Medications Ordered This Visit   Medications    colestipol (COLESTID) 1 g tablet     Sig: Take 1 tablet by mouth Daily.     Dispense:  30 tablet     Refill:  2       Discontinued Medications:   There are no discontinued medications.     Visit Diagnoses:    ICD-10-CM ICD-9-CM   1. Nausea  R11.0 787.02   2. Bloating  R14.0 787.3   3. Diarrhea due to malabsorption  K90.9 579.9    R19.7 787.91            Follow Up:   Return in about 3 months (around 9/28/2024).    The patient was in agreement with the plan and all questions were answered to patient's satisfaction.        This document has been electronically signed by Maggie Vázquez PA-C   June 28, 2024 14:15 EDT    Dictated Utilizing Dragon Dictation: Part of this note may be an electronic transcription/translation of spoken language to printed text using the Dragon Dictation System.    CC:  No ref. provider found  Tiera Valenzuela APRN

## 2024-06-30 NOTE — ASSESSMENT & PLAN NOTE
Continue Dexilant 60 mg  Advised to avoid known GI triggers such as spicy foods.  Upright 30 minutes after meals and avoid eating large meals.  Several small meals daily as able to avoid overfilling stomach.

## 2024-07-11 ENCOUNTER — TELEPHONE (OUTPATIENT)
Dept: GASTROENTEROLOGY | Facility: CLINIC | Age: 54
End: 2024-07-11

## 2024-07-11 NOTE — TELEPHONE ENCOUNTER
Patient called in. He was prescribed Colestipol. He had concerns about it being a cholesterol medication. I advised him that it was a cholesterol medication but was used to stop diarrhea. Patient then stated that the pill was too big for him to take and that he refused to take a cholesterol medication unless prescribed by his PCP. He is requesting something else be sent in and for a call back when it gets sent.

## 2024-07-12 NOTE — TELEPHONE ENCOUNTER
"Spoke with patient and gave him information. He stated that he has tried many other powder medications and none of them worked. I explained to him this was different. Patient stated \"whatever I just won't be coming back and you can cancel my tests\" and hung up the phone.   "

## 2024-07-16 ENCOUNTER — OFFICE VISIT (OUTPATIENT)
Dept: FAMILY MEDICINE CLINIC | Facility: CLINIC | Age: 54
End: 2024-07-16
Payer: COMMERCIAL

## 2024-07-16 ENCOUNTER — PRIOR AUTHORIZATION (OUTPATIENT)
Dept: FAMILY MEDICINE CLINIC | Facility: CLINIC | Age: 54
End: 2024-07-16
Payer: COMMERCIAL

## 2024-07-16 VITALS
RESPIRATION RATE: 18 BRPM | HEIGHT: 67 IN | SYSTOLIC BLOOD PRESSURE: 120 MMHG | BODY MASS INDEX: 36.98 KG/M2 | WEIGHT: 235.6 LBS | DIASTOLIC BLOOD PRESSURE: 72 MMHG | HEART RATE: 98 BPM | OXYGEN SATURATION: 97 %

## 2024-07-16 DIAGNOSIS — E78.2 MIXED HYPERLIPIDEMIA: ICD-10-CM

## 2024-07-16 DIAGNOSIS — F51.04 PSYCHOPHYSIOLOGICAL INSOMNIA: ICD-10-CM

## 2024-07-16 DIAGNOSIS — R93.1 DECREASED CARDIAC EJECTION FRACTION: ICD-10-CM

## 2024-07-16 DIAGNOSIS — R19.7 DIARRHEA, UNSPECIFIED TYPE: Primary | ICD-10-CM

## 2024-07-16 DIAGNOSIS — I51.89 LEFT VENTRICULAR HYPOKINESIS: ICD-10-CM

## 2024-07-16 PROCEDURE — 3074F SYST BP LT 130 MM HG: CPT | Performed by: NURSE PRACTITIONER

## 2024-07-16 PROCEDURE — 1125F AMNT PAIN NOTED PAIN PRSNT: CPT | Performed by: NURSE PRACTITIONER

## 2024-07-16 PROCEDURE — 3078F DIAST BP <80 MM HG: CPT | Performed by: NURSE PRACTITIONER

## 2024-07-16 PROCEDURE — 99214 OFFICE O/P EST MOD 30 MIN: CPT | Performed by: NURSE PRACTITIONER

## 2024-07-16 PROCEDURE — 1160F RVW MEDS BY RX/DR IN RCRD: CPT | Performed by: NURSE PRACTITIONER

## 2024-07-16 PROCEDURE — 1159F MED LIST DOCD IN RCRD: CPT | Performed by: NURSE PRACTITIONER

## 2024-07-16 RX ORDER — COLESTIPOL HYDROCHLORIDE 5 G/5G
5 GRANULE, FOR SUSPENSION ORAL DAILY
Qty: 30 EACH | Refills: 0 | Status: SHIPPED | OUTPATIENT
Start: 2024-07-16

## 2024-07-16 RX ORDER — TEMAZEPAM 15 MG/1
15 CAPSULE ORAL NIGHTLY PRN
Qty: 30 CAPSULE | Refills: 0 | Status: SHIPPED | OUTPATIENT
Start: 2024-07-16

## 2024-07-16 NOTE — PROGRESS NOTES
Subjective   Jaquan Mckay is a 53 y.o. male.     Chief Complaint   Patient presents with    Hypertension       History of Present Illness     Hypertension-chronic and ongoing.  He continues under the care of of cardiology but needs an updated appt.  He is currently on lisinopril 30 mg.  No recent chest pain.  No palpitations. He has not had a follow up since his stress test.  He was noted to have a EF of 50% and left ventricular hypokinesis.  His last cardiology follow-up was greater than 6 months ago.  Left ankle pain-under the care of Dr. Florez.  His last appointment was July 2, 2024 he was given diclofenac gel to use up to 4 times per day.  He will be placed in a short tide Cam boot for ADL mobilization with rest, ice, compression, and elevation for a concern with his left ankle after turning his ankle.  He was also given a Medrol Dosepak.  He does not have his boot on today.  He has a new brace as well but reports that he cannot tolerate the pressure of some of the hard plastic that is present on his lateral ankle bone region.    GI follow-up-patient has been given Colestid for his recurrent diarrhea.  Patient did decline to take the medication as he perceived it was for hyperlipidemia.  Discussed with patient about the benefits and possible use of medication.  He reports that it is large and he cannot swallow the tablet.  Hyperlipidemia-chronic and ongoing.  On crestor 40 mg.  No negative side effects.  Taking as directed. Patient denies any negative side effects of cholesterol medication.  No reported myalgia or myopathies.  Insomnia-on Restoril 30 mg.  No new concerns.  He has remeron 30 mg and is taking up to 1.5 tabs daily PRN.  Sleep pattern continues to be altered at times.     The following portions of the patient's history were reviewed and updated as appropriate: CC, ROS, allergies, current medications, past family history, past medical history, past social history, past surgical history and problem  "list.    Review of Systems   Constitutional:  Positive for fatigue. Negative for appetite change, unexpected weight gain and unexpected weight loss.   HENT:  Negative for congestion, ear pain, postnasal drip, rhinorrhea, sore throat, swollen glands, trouble swallowing and voice change.    Eyes:  Negative for pain and visual disturbance.   Respiratory:  Negative for cough, chest tightness, shortness of breath and wheezing.    Cardiovascular:  Negative for chest pain, palpitations and leg swelling.   Gastrointestinal:  Positive for abdominal pain and constipation. Negative for blood in stool, diarrhea, nausea and indigestion.   Genitourinary:  Negative for dysuria, hematuria and urgency.   Musculoskeletal:  Positive for arthralgias, gait problem and myalgias. Negative for back pain and joint swelling.   Skin:  Negative for color change and skin lesions.   Allergic/Immunologic: Negative.    Neurological:  Negative for dizziness, numbness and headache.   Hematological: Negative.    Psychiatric/Behavioral:  Positive for sleep disturbance and stress. Negative for dysphoric mood and suicidal ideas. The patient is not nervous/anxious.    All other systems reviewed and are negative.      Objective     /72   Pulse 98   Resp 18   Ht 170.2 cm (67\")   Wt 107 kg (235 lb 9.6 oz)   SpO2 97%   BMI 36.90 kg/m²     Physical Exam  Vitals reviewed.   Constitutional:       General: He is not in acute distress.     Appearance: He is well-developed. He is obese. He is not diaphoretic.   HENT:      Head: Normocephalic and atraumatic.      Jaw: No tenderness.      Right Ear: Hearing, tympanic membrane, ear canal and external ear normal.      Left Ear: Hearing, tympanic membrane, ear canal and external ear normal.      Nose: Nose normal. No nasal tenderness or congestion.      Right Sinus: No maxillary sinus tenderness or frontal sinus tenderness.      Left Sinus: No maxillary sinus tenderness or frontal sinus tenderness.      " Mouth/Throat:      Lips: Pink.      Mouth: Mucous membranes are moist.      Pharynx: Oropharynx is clear. Uvula midline.   Eyes:      General: Lids are normal. No scleral icterus.     Extraocular Movements:      Right eye: Normal extraocular motion and no nystagmus.      Left eye: Normal extraocular motion and no nystagmus.      Conjunctiva/sclera: Conjunctivae normal.      Pupils: Pupils are equal, round, and reactive to light.   Neck:      Thyroid: No thyromegaly or thyroid tenderness.      Vascular: No carotid bruit or JVD.      Trachea: No tracheal tenderness.   Cardiovascular:      Rate and Rhythm: Normal rate and regular rhythm.      Pulses:           Dorsalis pedis pulses are 2+ on the right side and 2+ on the left side.        Posterior tibial pulses are 2+ on the right side and 2+ on the left side.      Heart sounds: Normal heart sounds, S1 normal and S2 normal. No murmur heard.  Pulmonary:      Effort: Pulmonary effort is normal. No accessory muscle usage, prolonged expiration or respiratory distress.      Breath sounds: Normal breath sounds.   Chest:      Chest wall: No tenderness.   Abdominal:      General: Bowel sounds are normal. There is no distension.      Palpations: Abdomen is soft. There is no hepatomegaly, splenomegaly or mass.      Tenderness: There is no abdominal tenderness.   Musculoskeletal:         General: Tenderness present.      Cervical back: Normal range of motion and neck supple.      Lumbar back: Tenderness present.      Right lower leg: No edema.      Left lower leg: No edema.      Right ankle: Tenderness present. Decreased range of motion.      Left ankle: Tenderness present. Decreased range of motion.      Right foot: Normal capillary refill. Tenderness present.      Left foot: Normal capillary refill. Tenderness present.      Comments: No muscular atrophy or flaccidity.   Lymphadenopathy:      Head:      Right side of head: No submental or submandibular adenopathy.      Left side  of head: No submental or submandibular adenopathy.      Cervical: No cervical adenopathy.      Right cervical: No superficial cervical adenopathy.     Left cervical: No superficial cervical adenopathy.   Skin:     General: Skin is warm and dry.      Capillary Refill: Capillary refill takes less than 2 seconds.      Coloration: Skin is not jaundiced or pale.      Findings: No erythema.      Nails: There is no clubbing.   Neurological:      Mental Status: He is alert and oriented to person, place, and time.      Cranial Nerves: No cranial nerve deficit or facial asymmetry.      Sensory: No sensory deficit.      Motor: No weakness, tremor, atrophy or abnormal muscle tone.      Coordination: Coordination normal.      Deep Tendon Reflexes: Reflexes are normal and symmetric.   Psychiatric:         Attention and Perception: He is attentive.         Mood and Affect: Mood normal. Mood is not anxious or depressed.         Speech: Speech normal.         Behavior: Behavior normal. Behavior is cooperative.         Thought Content: Thought content normal.         Cognition and Memory: Cognition normal.         Judgment: Judgment normal.         Diagnoses and all orders for this visit:    1. Diarrhea, unspecified type (Primary)  Overview:  Added automatically from request for surgery [id number]    Orders:  -     colestipol (Colestid) 5 g packet; Take 1 packet by mouth Daily.  Dispense: 30 each; Refill: 0    2. Left ventricular hypokinesis  -     Ambulatory Referral to Cardiology    3. Decreased cardiac ejection fraction  -     Ambulatory Referral to Cardiology    4. Psychophysiological insomnia  Comments:  continue remeron and restoril  Overview:  With depression and anxiety      Assessment & Plan:  Continue Remeron.  Continue restoril   Continue to work on sleep Hygiene.    Orders:  -     temazepam (Restoril) 15 MG capsule; Take 1 capsule by mouth At Night As Needed for Sleep.  Dispense: 30 capsule; Refill: 0    5. Mixed  hyperlipidemia  Assessment & Plan:  Continue Crestor 40 mg   We will continue to monitor Lipid panel         Understands disease processes and need for medications.  Understands reasons for urgent and emergent care.  Patient (& family) verbalized agreement for treatment plan.   Emotional support and active listening provided.  Patient provided time to verbalize feelings.    CHAVEZ/LINN reviewed today and consistent.  Will refill prescribed controlled medication today.  Patient is aware they cannot receive narcotics from any other provider except if under care of pain management or speciality clinic.  Risk and benefits of medication use has been reviewed.  History and physical exam exhibit continued safe and appropriate use of controlled substances.  The patient is aware of the potential for addiction and dependence.  This patient has been made aware of the appropriate use of such medications, including potential risk of somnolence, limited ability to drive and / or work safely, and potential for overdose.    It has also been made clear that these medications are for use by this patient only, without concomitant use of alcohol or other substances unless prescribed/advised by medical provider.  Patient understands they may be subject to UDS and pill counts at random.    Patient considered to be low risk for addiction due to use of single controlled medications.  Patient understands and accepts these risks.  Patient need for medication will be reassessed at each visit.  Doses will be adjusted according to patient need and findings.    Goal of TX: Patient will not have any adverse reactions of medication.  Patient will have improvement in sleep hygiene/pattern with use of Restoril as needed as directed.    CHAVEZ Patient Controlled Substance Report (from 7/17/2023 to 7/16/2024)    Dispensed  Strength Quantity Days Supply Provider Pharmacy   06/12/2024 Temazepam 15MG 30 each 30 [facility]    05/09/2024 Temazepam 15MG 30 each 30 [facility]   04/09/2024 Temazepam 15MG 30 each 30 [facility]   03/12/2024 Temazepam 15MG 30 each 30 [facility]        RTC 1 month, sooner if needed.           This document has been electronically signed by:  BALDOMERO Thao FNP-C Dragon disclaimer:  Part of this note may be an electronic transcription/translation of spoken language to printed text using the Dragon Dictation System.

## 2024-07-24 DIAGNOSIS — R56.9 SEIZURES: ICD-10-CM

## 2024-07-24 RX ORDER — PHENYTOIN SODIUM 100 MG/1
200 CAPSULE, EXTENDED RELEASE ORAL 2 TIMES DAILY
Qty: 120 CAPSULE | Refills: 2 | Status: SHIPPED | OUTPATIENT
Start: 2024-07-24

## 2024-07-24 NOTE — TELEPHONE ENCOUNTER
Caller: Jaquan Mckay    Relationship: Self    Best call back number: [phone]     Requested Prescriptions:   Requested Prescriptions     Pending Prescriptions Disp Refills    Dilantin 100 MG capsule 120 capsule 0     Sig: Take 2 capsules by mouth 2 (Two) Times a Day.        Pharmacy where request should be sent: [facility]      Last office visit with prescribing clinician: 7/16/2024   Last telemedicine visit with prescribing clinician: Visit date not found   Next office visit with prescribing clinician: 8/14/2024     Additional details provided by patient: PATIENT HAS  2 DAYS LEFT    Does the patient have less than a 3 day supply:  [x] Yes  [] No    Would you like a call back once the refill request has been completed: [] Yes [x] No    If the office needs to give you a call back, can they leave a voicemail: [] Yes [x] No    Shakila Kim Rep   07/24/24 11:57 EDT

## 2024-07-31 ENCOUNTER — OFFICE VISIT (OUTPATIENT)
Dept: CARDIOLOGY | Facility: CLINIC | Age: 54
End: 2024-07-31
Payer: COMMERCIAL

## 2024-07-31 VITALS
HEART RATE: 93 BPM | HEIGHT: 67 IN | BODY MASS INDEX: 37.83 KG/M2 | OXYGEN SATURATION: 98 % | WEIGHT: 241 LBS | SYSTOLIC BLOOD PRESSURE: 147 MMHG | DIASTOLIC BLOOD PRESSURE: 88 MMHG

## 2024-07-31 DIAGNOSIS — I10 ESSENTIAL HYPERTENSION: ICD-10-CM

## 2024-07-31 DIAGNOSIS — R07.2 PRECORDIAL PAIN: Primary | ICD-10-CM

## 2024-07-31 DIAGNOSIS — R06.09 DYSPNEA ON EXERTION: ICD-10-CM

## 2024-07-31 DIAGNOSIS — E78.2 MIXED HYPERLIPIDEMIA: ICD-10-CM

## 2024-07-31 PROCEDURE — 1159F MED LIST DOCD IN RCRD: CPT | Performed by: NURSE PRACTITIONER

## 2024-07-31 PROCEDURE — 3079F DIAST BP 80-89 MM HG: CPT | Performed by: NURSE PRACTITIONER

## 2024-07-31 PROCEDURE — 99214 OFFICE O/P EST MOD 30 MIN: CPT | Performed by: NURSE PRACTITIONER

## 2024-07-31 PROCEDURE — 1160F RVW MEDS BY RX/DR IN RCRD: CPT | Performed by: NURSE PRACTITIONER

## 2024-07-31 PROCEDURE — 3077F SYST BP >= 140 MM HG: CPT | Performed by: NURSE PRACTITIONER

## 2024-07-31 NOTE — PROGRESS NOTES
Tiera Valenzuela, BALDOMERO  Jaquan Mckay  1970 07/31/2024    Patient Active Problem List   Diagnosis    Gastroesophageal reflux disease without esophagitis    Essential hypertension    Seizures    Hyperlipidemia    Anxiety    Varicocele present on ultrasound of scrotum    Chronic bilateral low back pain with left-sided sciatica    Seasonal allergic rhinitis due to pollen    Mild persistent asthma without complication    Precordial pain    Burning with urination    Congenital coronary artery anomaly    BMI 35.0-35.9,adult    Chondromalacia, knee    Achilles tendinitis of both lower extremities    Muscle spasm of both lower legs    Personal history of allergy to shellfish    Sensation of cold in lower extremity    Varicose vein of leg    Heart palpitations    Generalized anxiety disorder    Chronic elbow pain, right    Unstable angina    ASCVD (arteriosclerotic cardiovascular disease)    Other constipation    Class 2 severe obesity due to excess calories with serious comorbidity and body mass index (BMI) of 36.0 to 36.9 in adult    Bacteremia    Therapeutic opioid-induced constipation (OIC)    Rectal bleeding    Bloating    History of colon polyps    Lateral epicondylitis of right elbow    Psychophysiological insomnia    Chronic rhinitis    Vitamin D deficiency    Renal calculus    Chronic idiopathic constipation    Diarrhea    Bilateral flank pain    BPH without obstruction/lower urinary tract symptoms    Incomplete bladder emptying    Frequency of urination    Mixed stress and urge urinary incontinence       Dear Tiera Valenzuela, BALDOMERO:    Subjective     Chief Complaint   Patient presents with    Follow-up    Results     STRESS         History of Present Illness:    Jaquan Mckay is a 53 y.o. male with a past medical history of hypertension, dyslipidemia, seizures, and chronic chest pain. He presents today for cardiology follow up. He was last in our office in march of 2023. Since that time, has been to the ER for  "chest pains. Troponin has been negative. EKG revealing no acute ischemic changes. A stress test in June of 2024 was negative for evidence of myocardial ischemia but did show mild hypokinesis with an EF of 50%, consistent with a low risk study. He reports frequent chest pains for years in the substernal region. This does not improve with nitroglycerin. He cannot identify any aggravating or alleviating factors or associated symptoms. He is a smoker, 1.5 PPD. Has a history of hypertension and dyslipidemia. He is not a diabetic. He reports his father has had MI in the past as well as CABG, unsure of age.           Allergies   Allergen Reactions    Ciprofloxacin Anaphylaxis and Hives    Miralax [Polyethylene Glycol] Itching and Rash    Mobic [Meloxicam] Other (See Comments)     Pt states, \"It make my feet and hands go numb and I can't hardly walk.\"     Paxil [Paroxetine Hcl] Shortness Of Breath     Chest pain     Peanut-Containing Drug Products Anaphylaxis    Penicillins Anaphylaxis    Pristiq [Desvenlafaxine Succinate Er] Dizziness    Sulfa Antibiotics Anaphylaxis, Itching and Rash    Doxycycline Hives    Fish-Derived Products Hives    Isosorbide Nitrate Rash     Rash, hives, had to use inhaler.     Movantik [Naloxegol] Rash    Seroquel [Quetiapine] Hives and Rash    Trulance [Plecanatide] Hives    Buspar [Buspirone] Rash    Clarithromycin Rash    Clindamycin/Lincomycin Rash    Codeine Rash    Contrast Dye (Echo Or Unknown Ct/Mr) Itching and Rash    Diltiazem Rash    Flomax [Tamsulosin] Hives    Gabapentin Rash    Iodinated Contrast Media Itching and Rash    Keflex [Cephalexin] Rash    Levocetirizine Rash    Linzess [Linaclotide] Rash    Metoprolol Rash    Prednisone Rash and Other (See Comments)     Face, feet, and legs go completely numb per patient    Robitussin Cough+ Chest Max St [Dextromethorphan-Guaifenesin] Itching    Shrimp (Diagnostic) Rash    Spironolactone Rash    Viibryd [Vilazodone Hcl] Itching and Rash    " Zoloft [Sertraline Hcl] Hives and Itching   :      Current Outpatient Medications:     acetaminophen (TYLENOL) 500 MG tablet, Take 1 tablet by mouth Every 6 (Six) Hours As Needed for Mild Pain., Disp: 30 tablet, Rfl: 2    albuterol sulfate  (90 Base) MCG/ACT inhaler, , Disp: , Rfl:     Alcohol Swabs (Alcohol Wipes) 70 % pads, 1 each 2 (Two) Times a Day., Disp: 100 each, Rfl: 5    aspirin 81 MG EC tablet, Take 1 tablet by mouth Daily., Disp: 90 tablet, Rfl: 2    azelastine (ASTELIN) 0.1 % nasal spray, , Disp: , Rfl:     calcium polycarbophil (FiberCon) 625 MG tablet, Take 1 tablet by mouth 2 (Two) Times a Day., Disp: 60 tablet, Rfl: 3    clobetasol (TEMOVATE) 0.05 % ointment, Apply 1 application topically to the appropriate area as directed 2 (Two) Times a Day., Disp: 60 g, Rfl: 2    colestipol (Colestid) 5 g packet, Take 1 packet by mouth Daily., Disp: 30 each, Rfl: 0    dexlansoprazole (Dexilant) 60 MG capsule, Take 1 capsule by mouth 2 (Two) Times a Day., Disp: 180 capsule, Rfl: 5    dicyclomine (BENTYL) 10 MG capsule, Take 1 capsule by mouth 4 (Four) Times a Day Before Meals & at Bedtime., Disp: 30 capsule, Rfl: 0    Dilantin 100 MG capsule, Take 2 capsules by mouth 2 (Two) Times a Day., Disp: 120 capsule, Rfl: 2    diphenhydrAMINE (Benadryl Allergy) 25 MG tablet, Take 1-2 tablets by mouth Every 8 (Eight) Hours As Needed for Itching (or rash)., Disp: 120 tablet, Rfl: 2    Elastic Bandages & Supports (ACE Ankle Brace) misc, 1 each Daily., Disp: 1 each, Rfl: 0    fluticasone (Flovent HFA) 110 MCG/ACT inhaler, Inhale 1 puff 2 (Two) Times a Day., Disp: 12 g, Rfl: 1    guaiFENesin (Mucinex) 600 MG 12 hr tablet, Take 2 tablets by mouth 2 (Two) Times a Day., Disp: 20 tablet, Rfl: 0    lisinopril (PRINIVIL,ZESTRIL) 30 MG tablet, Take 1 tablet by mouth Daily. FOR BLOOD PRESSURE, Disp: 90 tablet, Rfl: 2    loratadine (CLARITIN) 10 MG tablet, Take 1 tablet by mouth Daily As Needed for Allergies., Disp: 90 tablet,  Rfl: 2    mirtazapine (REMERON) 30 MG tablet, Take 1.5 tablets by mouth Every Night., Disp: 45 tablet, Rfl: 5    montelukast (SINGULAIR) 10 MG tablet, Take 1 tablet by mouth., Disp: , Rfl:     multivitamin with minerals tablet tablet, Take 1 tablet by mouth Daily., Disp: 30 tablet, Rfl: 12    ondansetron (Zofran) 4 MG tablet, Take 1 tablet by mouth Every 8 (Eight) Hours As Needed for Nausea., Disp: 20 tablet, Rfl: 0    rosuvastatin (CRESTOR) 40 MG tablet, Take 1 tablet by mouth Daily., Disp: 90 tablet, Rfl: 2    senna (Senokot) 8.6 MG tablet, Take 2 tablets by mouth 2 (Two) Times a Day., Disp: 120 tablet, Rfl: 0    tamsulosin (FLOMAX) 0.4 MG capsule 24 hr capsule, Take 1 capsule by mouth Daily., Disp: 90 capsule, Rfl: 3    temazepam (Restoril) 15 MG capsule, Take 1 capsule by mouth At Night As Needed for Sleep., Disp: 30 capsule, Rfl: 0    TiZANidine (Zanaflex) 4 MG capsule, Take 1 capsule by mouth 3 (Three) Times a Day., Disp: 30 capsule, Rfl: 5    vitamin D (ERGOCALCIFEROL) 1.25 MG (07978 UT) capsule capsule, Take 1 capsule by mouth Every 7 (Seven) Days., Disp: 12 capsule, Rfl: 2      The following portions of the patient's history were reviewed and updated as appropriate: allergies, current medications, past family history, past medical history, past social history, past surgical history and problem list.    Social History     Tobacco Use    Smoking status: Every Day     Current packs/day: 1.00     Average packs/day: 1 pack/day for 17.4 years (17.4 ttl pk-yrs)     Types: Cigars, Cigarettes     Start date: 4/11/2022     Passive exposure: Current    Smokeless tobacco: Never   Vaping Use    Vaping status: Never Used   Substance Use Topics    Alcohol use: No    Drug use: No       Review of Systems   Constitutional: Negative for decreased appetite and malaise/fatigue.   Cardiovascular:  Positive for chest pain and dyspnea on exertion. Negative for palpitations.   Respiratory:  Negative for cough and shortness of  "breath.        Objective   Vitals:    07/31/24 1409   BP: 147/88   Pulse: 93   SpO2: 98%   Weight: 109 kg (241 lb)   Height: 170.2 cm (67\")     Body mass index is 37.75 kg/m².        Vitals reviewed.   Constitutional:       Appearance: Healthy appearance. Well-developed and not in distress.   HENT:      Head: Normocephalic and atraumatic.   Pulmonary:      Effort: Pulmonary effort is normal.      Breath sounds: Normal breath sounds. No wheezing. No rales.   Cardiovascular:      Normal rate. Regular rhythm.      Murmurs: There is no murmur.      . No S3 and S4 gallop.   Edema:     Peripheral edema absent.   Abdominal:      General: Bowel sounds are normal.      Palpations: Abdomen is soft.   Skin:     General: Skin is warm and dry.   Neurological:      Mental Status: Alert, oriented to person, place, and time and oriented to person, place and time.   Psychiatric:         Mood and Affect: Mood normal.         Behavior: Behavior normal.         Lab Results   Component Value Date     06/20/2024    K 4.4 06/20/2024     06/20/2024    CO2 21.7 (L) 06/20/2024    BUN 14 06/20/2024    CREATININE 1.09 06/20/2024    GLUCOSE 90 06/20/2024    CALCIUM 8.9 06/20/2024    AST 32 06/20/2024    ALT 26 06/20/2024    ALKPHOS 97 06/20/2024    LABIL2 1.1 (L) 05/29/2016     Lab Results   Component Value Date    WBC 5.72 06/20/2024    HGB 16.2 06/20/2024    HCT 45.9 06/20/2024     06/20/2024     Lab Results   Component Value Date    TSH 2.670 03/20/2024    PSA 0.6 11/17/2023    CHLPL 232 (H) 04/05/2016    TRIG 74 03/20/2024    HDL 65 (H) 03/20/2024     (H) 03/20/2024          Results for orders placed during the hospital encounter of 02/23/22    Adult Transthoracic Echo Complete W/ Cont if Necessary Per Protocol    Interpretation Summary  · Normal left ventricular cavity size and wall thickness noted. All left ventricular wall segments contract normally.  · Left ventricular ejection fraction appears to be 61 - " 65%.  · The mitral valve is structurally normal with no regurgitation or significant stenosis present.  · The aortic valve is not well visualized. The aortic valve is abnormal in structure. The aortic valve exhibits sclerosis. There is mild calcification of the aortic valve. No significant aortic valve regurgitation is present. No hemodynamically significant aortic valve stenosis is present.  · There is no evidence of pericardial effusion.  · Comments: May consider VIJI study to have a better look at the aortic valve if clinically warrented.     Results for orders placed during the hospital encounter of 06/04/24    Stress test with myocardial perfusion one day    Interpretation Summary  Images from the original result were not included.      A pharmacological stress test was performed using regadenoson without low-level exercise.    Findings consistent with a normal ECG stress test.    Myocardial perfusion imaging indicates a normal myocardial perfusion study with no evidence of ischemia.    Abnormal LV wall motion consistent with mild hypokineses.    Left ventricular ejection fraction is mildly reduced (Calculated EF = 50%).    Impressions are consistent with a low risk study.           Procedures    Assessment & Plan    Diagnosis Plan   1. Precordial pain  Adult Transthoracic Echo Complete W/ Cont if Necessary Per Protocol      2. Dyspnea on exertion  Adult Transthoracic Echo Complete W/ Cont if Necessary Per Protocol      3. Essential hypertension        4. Mixed hyperlipidemia                 Recommendations:  Precordial pain - recent stress test with no ischemia. I am unable to add isosorbide or metoprolol due to reported allergy. Unable to add Ranexa due to interaction with Dilantin. There was hypokinesis with calculated EF of 50% will order echocardiogram to evaluate LV wall motion. We discussed ischemic evaluation with CT coronary angiogram. He would need to be premedicated due to contrast dye allergy.  However, he has declined and prefers to discuss with Dr. Daily at follow up.  Essential hypertension - continue lisinopril.  Hyperlipidemia - continue Crestor.  Follow up in 3 weeks with Dr. Daily or sooner if needed.      Return in about 3 weeks (around 8/21/2024) for Recheck.    As always, I appreciate very much the opportunity to participate in the cardiovascular care of your patients.      With Best Regards,    BALDOMERO Horowitz

## 2024-08-06 ENCOUNTER — TELEPHONE (OUTPATIENT)
Dept: FAMILY MEDICINE CLINIC | Facility: CLINIC | Age: 54
End: 2024-08-06

## 2024-08-06 NOTE — TELEPHONE ENCOUNTER
Caller: NEISHA MARAVILLA    Relationship:     Best call back number: [phone]  CASE # [id number]    What orders are you requesting (i.e. lab or imaging):     ECHOCARDIAGRAM    Additional notes:     SHE IS FAXING OVER AN APPROVAL FOR THE ECHOCARDIAGRAM

## 2024-08-07 ENCOUNTER — HOSPITAL ENCOUNTER (OUTPATIENT)
Dept: CARDIOLOGY | Facility: HOSPITAL | Age: 54
Discharge: HOME | End: 2024-08-07
Admitting: NURSE PRACTITIONER
Payer: COMMERCIAL

## 2024-08-07 DIAGNOSIS — R06.09 DYSPNEA ON EXERTION: ICD-10-CM

## 2024-08-07 DIAGNOSIS — R07.2 PRECORDIAL PAIN: ICD-10-CM

## 2024-08-07 LAB
BH CV ECHO MEAS - ACS: 1.7 CM
BH CV ECHO MEAS - AO MAX PG: 14 MMHG
BH CV ECHO MEAS - AO MEAN PG: 8 MMHG
BH CV ECHO MEAS - AO ROOT DIAM: 3.4 CM
BH CV ECHO MEAS - AO V2 MAX: 187 CM/SEC
BH CV ECHO MEAS - AO V2 VTI: 34.7 CM
BH CV ECHO MEAS - AVA(I,D): 1.87 CM2
BH CV ECHO MEAS - EDV(CUBED): 138.6 ML
BH CV ECHO MEAS - EDV(MOD-SP4): 110 ML
BH CV ECHO MEAS - EF(MOD-SP4): 75.1 %
BH CV ECHO MEAS - ESV(CUBED): 48 ML
BH CV ECHO MEAS - ESV(MOD-SP4): 27.4 ML
BH CV ECHO MEAS - FS: 29.8 %
BH CV ECHO MEAS - IVS/LVPW: 1.08 CM
BH CV ECHO MEAS - IVSD: 1.15 CM
BH CV ECHO MEAS - LA DIMENSION: 3 CM
BH CV ECHO MEAS - LAT PEAK E' VEL: 9.9 CM/SEC
BH CV ECHO MEAS - LV DIASTOLIC VOL/BSA (35-75): 50.3 CM2
BH CV ECHO MEAS - LV MASS(C)D: 221.1 GRAMS
BH CV ECHO MEAS - LV MAX PG: 3.3 MMHG
BH CV ECHO MEAS - LV MEAN PG: 2 MMHG
BH CV ECHO MEAS - LV SYSTOLIC VOL/BSA (12-30): 12.5 CM2
BH CV ECHO MEAS - LV V1 MAX: 90.4 CM/SEC
BH CV ECHO MEAS - LV V1 VTI: 17.1 CM
BH CV ECHO MEAS - LVIDD: 5.2 CM
BH CV ECHO MEAS - LVIDS: 3.6 CM
BH CV ECHO MEAS - LVOT AREA: 3.8 CM2
BH CV ECHO MEAS - LVOT DIAM: 2.2 CM
BH CV ECHO MEAS - LVPWD: 1.07 CM
BH CV ECHO MEAS - MED PEAK E' VEL: 8.1 CM/SEC
BH CV ECHO MEAS - MV A MAX VEL: 81 CM/SEC
BH CV ECHO MEAS - MV E MAX VEL: 98.1 CM/SEC
BH CV ECHO MEAS - MV E/A: 1.21
BH CV ECHO MEAS - PA ACC TIME: 0.09 SEC
BH CV ECHO MEAS - SV(LVOT): 65 ML
BH CV ECHO MEAS - SV(MOD-SP4): 82.6 ML
BH CV ECHO MEAS - SVI(LVOT): 29.7 ML/M2
BH CV ECHO MEAS - SVI(MOD-SP4): 37.7 ML/M2
BH CV ECHO MEAS - TAPSE (>1.6): 2.5 CM
BH CV ECHO MEASUREMENTS AVERAGE E/E' RATIO: 10.9
LEFT ATRIUM VOLUME INDEX: 12.6 ML/M2

## 2024-08-07 PROCEDURE — 93306 TTE W/DOPPLER COMPLETE: CPT | Performed by: INTERNAL MEDICINE

## 2024-08-07 PROCEDURE — 93306 TTE W/DOPPLER COMPLETE: CPT

## 2024-08-14 ENCOUNTER — HOSPITAL ENCOUNTER (OUTPATIENT)
Facility: HOSPITAL | Age: 54
Discharge: HOME | End: 2024-08-14
Admitting: NURSE PRACTITIONER
Payer: COMMERCIAL

## 2024-08-14 ENCOUNTER — OFFICE VISIT (OUTPATIENT)
Dept: FAMILY MEDICINE CLINIC | Facility: CLINIC | Age: 54
End: 2024-08-14
Payer: COMMERCIAL

## 2024-08-14 VITALS
HEIGHT: 67 IN | RESPIRATION RATE: 16 BRPM | DIASTOLIC BLOOD PRESSURE: 60 MMHG | OXYGEN SATURATION: 98 % | HEART RATE: 81 BPM | BODY MASS INDEX: 37.17 KG/M2 | WEIGHT: 236.8 LBS | SYSTOLIC BLOOD PRESSURE: 120 MMHG

## 2024-08-14 DIAGNOSIS — F51.04 PSYCHOPHYSIOLOGICAL INSOMNIA: ICD-10-CM

## 2024-08-14 DIAGNOSIS — Z51.81 ENCOUNTER FOR THERAPEUTIC DRUG MONITORING: ICD-10-CM

## 2024-08-14 DIAGNOSIS — Z79.899 ENCOUNTER FOR LONG-TERM (CURRENT) USE OF OTHER MEDICATIONS: ICD-10-CM

## 2024-08-14 DIAGNOSIS — R56.9 SEIZURES: ICD-10-CM

## 2024-08-14 DIAGNOSIS — M79.671 RIGHT FOOT PAIN: Primary | ICD-10-CM

## 2024-08-14 DIAGNOSIS — Z91.030 BEE STING ALLERGY: ICD-10-CM

## 2024-08-14 PROCEDURE — 73610 X-RAY EXAM OF ANKLE: CPT

## 2024-08-14 PROCEDURE — 3078F DIAST BP <80 MM HG: CPT | Performed by: NURSE PRACTITIONER

## 2024-08-14 PROCEDURE — 99214 OFFICE O/P EST MOD 30 MIN: CPT | Performed by: NURSE PRACTITIONER

## 2024-08-14 PROCEDURE — 1160F RVW MEDS BY RX/DR IN RCRD: CPT | Performed by: NURSE PRACTITIONER

## 2024-08-14 PROCEDURE — 3074F SYST BP LT 130 MM HG: CPT | Performed by: NURSE PRACTITIONER

## 2024-08-14 PROCEDURE — 1125F AMNT PAIN NOTED PAIN PRSNT: CPT | Performed by: NURSE PRACTITIONER

## 2024-08-14 PROCEDURE — 1159F MED LIST DOCD IN RCRD: CPT | Performed by: NURSE PRACTITIONER

## 2024-08-14 PROCEDURE — 73630 X-RAY EXAM OF FOOT: CPT

## 2024-08-14 RX ORDER — PHENYTOIN SODIUM 100 MG/1
200 CAPSULE, EXTENDED RELEASE ORAL 2 TIMES DAILY
Qty: 120 CAPSULE | Refills: 2 | Status: SHIPPED | OUTPATIENT
Start: 2024-08-14

## 2024-08-14 RX ORDER — HYDROCODONE BITARTRATE AND ACETAMINOPHEN 7.5; 325 MG/1; MG/1
1 TABLET ORAL EVERY 8 HOURS PRN
Qty: 9 TABLET | Refills: 0 | Status: SHIPPED | OUTPATIENT
Start: 2024-08-14 | End: 2024-08-26

## 2024-08-14 RX ORDER — TEMAZEPAM 15 MG/1
15 CAPSULE ORAL NIGHTLY PRN
Qty: 30 CAPSULE | Refills: 0 | Status: SHIPPED | OUTPATIENT
Start: 2024-08-14

## 2024-08-14 NOTE — PROGRESS NOTES
"Subjective   Jaquan Mckay is a 53 y.o. male.     Chief Complaint   Patient presents with    Diarrhea, unspecified type       History of Present Illness     Fall-reports he had a fall.  The is a re-occurance for patient.   He reports he slipped and fell down, face forward, in a ditch.  He reports that he had his left foot brace on but twisted his right foot backward.  He is having some right medial ankle pain and over his achilles.    Bee stings-reports multiple.  He did not have his Epi Pen with him.  He had some SOA and sensation that his throat felt tight.  He reports \"15 times by a yellow jacket\".  He reports that he took Benadryl.  He reports that he took several doses.  He will be seeing his allergy specialist about the sting reaction and to see if he is able to get his allergy injection this week.    Incontinence-has been getting pull ups from  a home delivery service but reports they are to tall in the waist line.  He would like to have depends fit flex xl.  He has occasionally obtained some OTC depends fit flex size xL that seems to fit better.  He would like to try and obtain them.      The following portions of the patient's history were reviewed and updated as appropriate: CC, ROS, allergies, current medications, past family history, past medical history, past social history, past surgical history and problem list.      Review of Systems   Constitutional:  Positive for fatigue. Negative for appetite change, unexpected weight gain and unexpected weight loss.   HENT:  Negative for congestion, ear pain, postnasal drip, rhinorrhea, sore throat, swollen glands, trouble swallowing and voice change.    Eyes:  Negative for blurred vision, double vision, pain and visual disturbance.   Respiratory:  Negative for cough, chest tightness, shortness of breath and wheezing.    Cardiovascular:  Negative for chest pain, palpitations and leg swelling.   Gastrointestinal:  Positive for constipation and diarrhea. Negative " "for abdominal pain, blood in stool, nausea, vomiting and indigestion.   Genitourinary:  Negative for dysuria, hematuria and urgency.   Musculoskeletal:  Positive for arthralgias, gait problem and myalgias. Negative for back pain and joint swelling.   Skin:  Negative for color change and skin lesions.   Allergic/Immunologic: Negative.    Neurological:  Negative for dizziness, numbness and headache.   Hematological: Negative.    Psychiatric/Behavioral:  Positive for stress. Negative for dysphoric mood, sleep disturbance and suicidal ideas. The patient is not nervous/anxious.    All other systems reviewed and are negative.      Objective     /60   Pulse 81   Resp 16   Ht 170.2 cm (67\")   Wt 107 kg (236 lb 12.8 oz)   SpO2 98%   BMI 37.09 kg/m²     Physical Exam  Vitals reviewed.   Constitutional:       General: He is not in acute distress.     Appearance: He is well-developed. He is obese. He is not diaphoretic.   HENT:      Head: Normocephalic and atraumatic.      Jaw: No tenderness.      Right Ear: Hearing, tympanic membrane, ear canal and external ear normal.      Left Ear: Hearing, tympanic membrane, ear canal and external ear normal.      Nose: Nose normal. No nasal tenderness or congestion.      Right Sinus: No maxillary sinus tenderness or frontal sinus tenderness.      Left Sinus: No maxillary sinus tenderness or frontal sinus tenderness.      Mouth/Throat:      Lips: Pink.      Mouth: Mucous membranes are moist.      Pharynx: Oropharynx is clear. Uvula midline.   Eyes:      General: Lids are normal. No scleral icterus.     Extraocular Movements:      Right eye: Normal extraocular motion and no nystagmus.      Left eye: Normal extraocular motion and no nystagmus.      Conjunctiva/sclera: Conjunctivae normal.      Pupils: Pupils are equal, round, and reactive to light.   Neck:      Thyroid: No thyromegaly or thyroid tenderness.      Vascular: No carotid bruit or JVD.      Trachea: No tracheal " tenderness.   Cardiovascular:      Rate and Rhythm: Normal rate and regular rhythm.      Pulses:           Dorsalis pedis pulses are 2+ on the right side and 2+ on the left side.        Posterior tibial pulses are 2+ on the right side and 2+ on the left side.      Heart sounds: Normal heart sounds, S1 normal and S2 normal. No murmur heard.  Pulmonary:      Effort: Pulmonary effort is normal. No accessory muscle usage, prolonged expiration or respiratory distress.      Breath sounds: Normal breath sounds.   Chest:      Chest wall: No tenderness.   Abdominal:      General: Bowel sounds are normal. There is no distension.      Palpations: Abdomen is soft. There is no hepatomegaly, splenomegaly or mass.      Tenderness: There is no abdominal tenderness.   Musculoskeletal:         General: Tenderness present.      Cervical back: Normal range of motion and neck supple.      Lumbar back: Tenderness present.      Right lower leg: No edema.      Left lower leg: No edema.      Right ankle: Tenderness present. Decreased range of motion.      Left ankle: Tenderness present. Decreased range of motion.      Right foot: Normal capillary refill. Tenderness present.      Left foot: Normal capillary refill. Tenderness present.      Comments: No muscular atrophy or flaccidity.   Lymphadenopathy:      Head:      Right side of head: No submental or submandibular adenopathy.      Left side of head: No submental or submandibular adenopathy.      Cervical: No cervical adenopathy.      Right cervical: No superficial cervical adenopathy.     Left cervical: No superficial cervical adenopathy.   Skin:     General: Skin is warm and dry.      Capillary Refill: Capillary refill takes less than 2 seconds.      Coloration: Skin is not jaundiced or pale.      Findings: No erythema.      Nails: There is no clubbing.   Neurological:      Mental Status: He is alert and oriented to person, place, and time.      Cranial Nerves: No cranial nerve deficit  or facial asymmetry.      Sensory: No sensory deficit.      Motor: No weakness, tremor, atrophy or abnormal muscle tone.      Coordination: Coordination normal.      Gait: Gait abnormal (moderate antalgia).      Deep Tendon Reflexes: Reflexes are normal and symmetric.   Psychiatric:         Attention and Perception: He is attentive.         Mood and Affect: Mood normal. Mood is not anxious or depressed.         Speech: Speech normal.         Behavior: Behavior normal. Behavior is cooperative.         Thought Content: Thought content normal.         Cognition and Memory: Cognition normal.         Judgment: Judgment normal.         Diagnoses and all orders for this visit:    1. Right foot pain (Primary)  Comments:  continue under the care of podiatry  Orders:  -     XR Foot 3+ View Right  -     XR Ankle 3+ View Right  -     HYDROcodone-acetaminophen (Norco) 7.5-325 MG per tablet; Take 1 tablet by mouth Every 8 (Eight) Hours As Needed for Moderate Pain.  Dispense: 9 tablet; Refill: 0    2. Psychophysiological insomnia  Comments:  continue remeron and restoril  Overview:  With depression and anxiety      Orders:  -     temazepam (Restoril) 15 MG capsule; Take 1 capsule by mouth At Night As Needed for Sleep.  Dispense: 30 capsule; Refill: 0    3. Seizures  Comments:  No known recent activity.  Continue Dilantin.  We will plan for an updated phenytoin level  Orders:  -     Dilantin 100 MG capsule; Take 2 capsules by mouth 2 (Two) Times a Day.  Dispense: 120 capsule; Refill: 2    4. Bee sting allergy  Comments:  keep Epi-pen available.    5. Encounter for therapeutic drug monitoring  -     Urine Drug Screen - Urine, Clean Catch; Future    6. Encounter for long-term (current) use of other medications  -     Urine Drug Screen - Urine, Clean Catch; Future       Understands disease processes and need for medications.  Understands reasons for urgent and emergent care.  Patient (& family) verbalized agreement for treatment plan.    Emotional support and active listening provided.  Patient provided time to verbalize feelings.    UDS requested per external lab while patient in office today.  Buccal swab or blood sample will be obtained if patient is unable to provide specimen.     CHAVEZ/LINN reviewed today and consistent.  Will refill prescribed controlled medication today.  Patient is aware they cannot receive narcotics from any other provider except if under care of pain management or speciality clinic.  Risk and benefits of medication use has been reviewed.  History and physical exam exhibit continued safe and appropriate use of controlled substances.  The patient is aware of the potential for addiction and dependence.  This patient has been made aware of the appropriate use of such medications, including potential risk of somnolence, limited ability to drive and / or work safely, and potential for overdose.    It has also been made clear that these medications are for use by this patient only, without concomitant use of alcohol or other substances unless prescribed/advised by medical provider.  Patient understands they may be subject to UDS and pill counts at random.    Patient considered to be moderate risk for addiction due to use of multiple controlled medications.  Patient understands and accepts these risks.  Patient need for medication will be reassessed at each visit.  Doses will be adjusted according to patient need and findings.    Goal of [place]: Patient will not have any adverse reactions of medication.  Patient have reduction in pain symptoms with use of PRN Norco as directed.  Patient will not have any adverse side effects from medication.  Patient will be able to remain active in an outside of home without interference from pain symptoms.  Patient will have improvement in sleep hygiene/pattern with use of Restoril as needed as directed.    CHAVEZ Patient Controlled Substance Report (from 8/20/2023 to 8/14/2024)    Dispensed   Strength Quantity Days Supply Provider Pharmacy   07/16/2024 Temazepam 15MG 30 each 30 [facility]   06/12/2024 [facility]   05/09/2024 [facility]   04/09/2024 [facility]MG [facility]   03/12/2024 [facility]   01/29/2024 Diazepam 10MG 1 each 1 [facility]        RTC 1 month, sooner if needed.             This document has been electronically signed by:  BALDOMERO Thao FNP-C Dragon disclaimer:  Part of this note may be an electronic transcription/translation of spoken language to printed text using the Dragon Dictation System.

## 2024-08-26 ENCOUNTER — TELEPHONE (OUTPATIENT)
Dept: FAMILY MEDICINE CLINIC | Facility: CLINIC | Age: 54
End: 2024-08-26

## 2024-08-26 DIAGNOSIS — M79.671 RIGHT FOOT PAIN: Primary | ICD-10-CM

## 2024-08-26 RX ORDER — HYDROCODONE BITARTRATE AND ACETAMINOPHEN 5; 325 MG/1; MG/1
1-1.5 TABLET ORAL EVERY 8 HOURS PRN
Qty: 12 TABLET | Refills: 0 | Status: SHIPPED | OUTPATIENT
Start: 2024-08-26

## 2024-08-28 ENCOUNTER — OFFICE VISIT (OUTPATIENT)
Dept: CARDIOLOGY | Facility: CLINIC | Age: 54
End: 2024-08-28
Payer: COMMERCIAL

## 2024-08-28 VITALS
SYSTOLIC BLOOD PRESSURE: 108 MMHG | HEART RATE: 79 BPM | HEIGHT: 67 IN | BODY MASS INDEX: 37.57 KG/M2 | OXYGEN SATURATION: 98 % | DIASTOLIC BLOOD PRESSURE: 66 MMHG | WEIGHT: 239.4 LBS | RESPIRATION RATE: 16 BRPM

## 2024-08-28 DIAGNOSIS — R07.2 PRECORDIAL PAIN: Primary | ICD-10-CM

## 2024-08-28 DIAGNOSIS — E78.5 DYSLIPIDEMIA: ICD-10-CM

## 2024-08-28 DIAGNOSIS — I10 ESSENTIAL HYPERTENSION: ICD-10-CM

## 2024-08-28 PROCEDURE — 99214 OFFICE O/P EST MOD 30 MIN: CPT | Performed by: INTERNAL MEDICINE

## 2024-08-28 PROCEDURE — 3074F SYST BP LT 130 MM HG: CPT | Performed by: INTERNAL MEDICINE

## 2024-08-28 PROCEDURE — 3078F DIAST BP <80 MM HG: CPT | Performed by: INTERNAL MEDICINE

## 2024-08-28 NOTE — LETTER
August 28, 2024     BALDOMERO Thao  [address] [facility] [address]    Patient: Jaquan Mckay   YOB: 1970   Date of Visit: 8/28/2024     Dear BALDOMERO Thao:       Thank you for referring Jaquan Mckay to me for evaluation. Below are the relevant portions of my assessment and plan of care.    If you have questions, please do not hesitate to call me. I look forward to following Jaquan along with you.         Sincerely,        Leandro Daily MD        CC: No Recipients    eLandro Daily MD  08/28/24 1806  Sign when Signing Visit  Tiera Valenzuela APRN  Jaquan Mckay  1970 08/28/2024    Patient Active Problem List   Diagnosis   • Gastroesophageal reflux disease without esophagitis   • Essential hypertension   • Seizures   • Hyperlipidemia   • Anxiety   • Varicocele present on ultrasound of scrotum   • Chronic bilateral low back pain with left-sided sciatica   • Seasonal allergic rhinitis due to pollen   • Mild persistent asthma without complication   • Precordial pain   • Burning with urination   • Congenital coronary artery anomaly   • BMI 35.0-35.9,adult   • Chondromalacia, knee   • Achilles tendinitis of both lower extremities   • Muscle spasm of both lower legs   • Personal history of allergy to shellfish   • Sensation of cold in lower extremity   • Varicose vein of leg   • Heart palpitations   • Generalized anxiety disorder   • Chronic elbow pain, right   • Unstable angina   • ASCVD (arteriosclerotic cardiovascular disease)   • Other constipation   • Class 2 severe obesity due to excess calories with serious comorbidity and body mass index (BMI) of 36.0 to 36.9 in adult   • Bacteremia   • Therapeutic opioid-induced constipation (OIC)   • Rectal bleeding   • Bloating   • History of colon polyps   • Lateral epicondylitis of right elbow   • Psychophysiological insomnia   • Chronic rhinitis   • Vitamin D deficiency   • Renal calculus   • Chronic idiopathic constipation   •  Diarrhea   • Bilateral flank pain   • BPH without obstruction/lower urinary tract symptoms   • Incomplete bladder emptying   • Frequency of urination   • Mixed stress and urge urinary incontinence       Tiera Arreaga APRN:    Subjective     Jaquan Mckay is a 53 y.o. male with the problems as listed above, presents    Chief complaint: Recurrent chest pains.    History of Present Illness: Mr. Colon-year-old is a pleasant 53-year-old  male with no history of known coronary artery disease, has chronic intermittent chest pains.  He is Lexiscan sestamibi study on 6/4/2024 revealed no evidence of myocardial ischemia but there was mildly decreased LV systolic function.  He is back again with recurrent chest pains which she says have been occurring frequently.  He describes these pains as being felt as sharp pains that seem to occur at random and resolve spontaneously.  They are of moderate intensity with some acidic shortness of breath but no sweating.  His echo Doppler study recently revealed normal LV wall motion and systolic function with an estimated LV ejection fraction of about 61 to 65% with questionable bicuspid arctic valve with no significant stenosis or regurgitation.  He states that his mom and dad had heart attacks in their 60s.    Regadenoson Stress Test with Myocardial Perfusion SPECT (Multi Study)    Accession Number: [id number]   Date of Study: 6/4/24   Ordering Provider: Leandor Daily MD   Clinical Indications: Chest Pain        [facility]  Performing Staff   [facility] [place], SPECT [place]: Leandro Daily MD    Tech: Nate Ramirez, RN   Support Staff: Ronald Ledesma        Interpretation Summary   •  A pharmacological stress test was performed using regadenoson without low-level exercise.  •  Findings consistent with a normal ECG stress test.  •  Myocardial perfusion imaging indicates a normal myocardial perfusion study with no evidence of ischemia.  •  Abnormal LV wall  "motion consistent with mild hypokineses.  •  Left ventricular ejection fraction is mildly reduced (Calculated EF = 50%).  •  Impressions are consistent with a low risk study.        Complete Transthoracic Echocardiogram with Complete Doppler and Color Flow    Accession Number: [id number]   Date of Study: 8/7/24   Ordering Provider: Virginie Garcia APRN   Clinical Indications: Dyspnea        [facility]  Performing Staff   Cardiology: Leandro Daily MD    Tech: Aneesh Yip          Clinical Indication    Dyspnea   Dx: Precordial pain [R07.2 (ICD-10-CM)]; Dyspnea on exertion [R06.09 (ICD-10-CM)]     Interpretation Summary   •  Normal left ventricular cavity size and wall thickness noted. All left ventricular wall segments contract normally.  •  Left ventricular ejection fraction appears to be 61 - 65%.  •  Left ventricular diastolic function was normal.  •  The aortic valve is abnormal in structure. A bicuspid aortic valve cannot be excluded. There is mild calcification of the aortic valve.  •  Trace aortic valve regurgitation is present. No hemodynamically significant aortic valve stenosis is present.  •  The mitral valve is structurally normal with no significant stenosis present. Trace mitral valve regurgitation is present.  •  There is no evidence of pericardial effusion. .       Allergies   Allergen Reactions   • Ciprofloxacin Anaphylaxis and Hives   • Miralax [Polyethylene Glycol] Itching and Rash   • Mobic [Meloxicam] Other (See Comments)     Pt states, \"It make my feet and hands go numb and I can't hardly walk.\"    • Paxil [Paroxetine Hcl] Shortness Of Breath     Chest pain    • Peanut-Containing Drug Products Anaphylaxis   • Penicillins Anaphylaxis   • Pristiq [Desvenlafaxine Succinate Er] Dizziness   • Sulfa Antibiotics Anaphylaxis, Itching and Rash   • Doxycycline Hives   • Fish-Derived Products Hives   • Isosorbide Nitrate Rash     Rash, hives, had to use inhaler.    • Movantik [Naloxegol] " Rash   • Seroquel [Quetiapine] Hives and Rash   • Trulance [Plecanatide] Hives   • Buspar [Buspirone] Rash   • Clarithromycin Rash   • Clindamycin/Lincomycin Rash   • Codeine Rash   • Contrast Dye (Echo Or Unknown Ct/Mr) Itching and Rash   • Diltiazem Rash   • Flomax [Tamsulosin] Hives   • Gabapentin Rash   • Iodinated Contrast Media Itching and Rash   • Keflex [Cephalexin] Rash   • Levocetirizine Rash   • Linzess [Linaclotide] Rash   • Metoprolol Rash   • Prednisone Rash and Other (See Comments)     Face, feet, and legs go completely numb per patient   • Robitussin Cough+ Chest Max St [Dextromethorphan-Guaifenesin] Itching   • Shrimp (Diagnostic) Rash   • Spironolactone Rash   • Viibryd [Vilazodone Hcl] Itching and Rash   • Zoloft [Sertraline Hcl] Hives and Itching   :    Current Outpatient Medications:   •  acetaminophen (TYLENOL) 500 MG tablet, Take 1 tablet by mouth Every 6 (Six) Hours As Needed for Mild Pain., Disp: 30 tablet, Rfl: 2  •  albuterol sulfate  (90 Base) MCG/ACT inhaler, , Disp: , Rfl:   •  Alcohol Swabs (Alcohol Wipes) 70 % pads, 1 each 2 (Two) Times a Day., Disp: 100 each, Rfl: 5  •  aspirin 81 MG EC tablet, Take 1 tablet by mouth Daily., Disp: 90 tablet, Rfl: 2  •  azelastine (ASTELIN) 0.1 % nasal spray, , Disp: , Rfl:   •  calcium polycarbophil (FiberCon) 625 MG tablet, Take 1 tablet by mouth 2 (Two) Times a Day., Disp: 60 tablet, Rfl: 3  •  clobetasol (TEMOVATE) 0.05 % ointment, Apply 1 application topically to the appropriate area as directed 2 (Two) Times a Day., Disp: 60 g, Rfl: 2  •  colestipol (Colestid) 5 g packet, Take 1 packet by mouth Daily., Disp: 30 each, Rfl: 0  •  dexlansoprazole (Dexilant) 60 MG capsule, Take 1 capsule by mouth 2 (Two) Times a Day., Disp: 180 capsule, Rfl: 5  •  dicyclomine (BENTYL) 10 MG capsule, Take 1 capsule by mouth 4 (Four) Times a Day Before Meals & at Bedtime., Disp: 30 capsule, Rfl: 0  •  Dilantin 100 MG capsule, Take 2 capsules by mouth 2 (Two)  Times a Day., Disp: 120 capsule, Rfl: 2  •  diphenhydrAMINE (Benadryl Allergy) 25 MG tablet, Take 1-2 tablets by mouth Every 8 (Eight) Hours As Needed for Itching (or rash)., Disp: 120 tablet, Rfl: 2  •  Elastic Bandages & Supports (ACE Ankle Brace) misc, 1 each Daily., Disp: 1 each, Rfl: 0  •  fluticasone (Flovent HFA) 110 MCG/ACT inhaler, Inhale 1 puff 2 (Two) Times a Day., Disp: 12 g, Rfl: 1  •  guaiFENesin (Mucinex) 600 MG 12 hr tablet, Take 2 tablets by mouth 2 (Two) Times a Day., Disp: 20 tablet, Rfl: 0  •  HYDROcodone-acetaminophen (Norco) 5-325 MG per tablet, Take 1-1.5 tablets by mouth Every 8 (Eight) Hours As Needed for Moderate Pain., Disp: 12 tablet, Rfl: 0  •  lisinopril (PRINIVIL,ZESTRIL) 30 MG tablet, Take 1 tablet by mouth Daily. FOR BLOOD PRESSURE, Disp: 90 tablet, Rfl: 2  •  loratadine (CLARITIN) 10 MG tablet, Take 1 tablet by mouth Daily As Needed for Allergies., Disp: 90 tablet, Rfl: 2  •  mirtazapine (REMERON) 30 MG tablet, Take 1.5 tablets by mouth Every Night., Disp: 45 tablet, Rfl: 5  •  montelukast (SINGULAIR) 10 MG tablet, Take 1 tablet by mouth., Disp: , Rfl:   •  multivitamin with minerals tablet tablet, Take 1 tablet by mouth Daily., Disp: 30 tablet, Rfl: 12  •  ondansetron (Zofran) 4 MG tablet, Take 1 tablet by mouth Every 8 (Eight) Hours As Needed for Nausea., Disp: 20 tablet, Rfl: 0  •  rosuvastatin (CRESTOR) 40 MG tablet, Take 1 tablet by mouth Daily., Disp: 90 tablet, Rfl: 2  •  senna (Senokot) 8.6 MG tablet, Take 2 tablets by mouth 2 (Two) Times a Day., Disp: 120 tablet, Rfl: 0  •  tamsulosin (FLOMAX) 0.4 MG capsule 24 hr capsule, Take 1 capsule by mouth Daily., Disp: 90 capsule, Rfl: 3  •  temazepam (Restoril) 15 MG capsule, Take 1 capsule by mouth At Night As Needed for Sleep., Disp: 30 capsule, Rfl: 0  •  TiZANidine (Zanaflex) 4 MG capsule, Take 1 capsule by mouth 3 (Three) Times a Day., Disp: 30 capsule, Rfl: 5  •  vitamin D (ERGOCALCIFEROL) 1.25 MG (94700 UT) capsule capsule,  "Take 1 capsule by mouth Every 7 (Seven) Days., Disp: 12 capsule, Rfl: 2    The following portions of the patient's history were reviewed and updated as appropriate: allergies, current medications, past family history, past medical history, past social history, past surgical history and problem list.    Social History     Tobacco Use   • Smoking status: Every Day     Current packs/day: 1.00     Average packs/day: 1 pack/day for 17.5 years (17.5 ttl pk-yrs)     Types: Cigars, Cigarettes     Start date: 4/11/2022     Passive exposure: Current   • Smokeless tobacco: Never   Vaping Use   • Vaping status: Never Used   Substance Use Topics   • Alcohol use: No   • Drug use: No     Review of Systems   Constitutional: Negative for chills and fever.   HENT:  Negative for nosebleeds and sore throat.    Cardiovascular:  Positive for chest pain.   Respiratory:  Negative for cough, hemoptysis and wheezing.    Gastrointestinal:  Negative for abdominal pain, hematemesis, hematochezia, melena, nausea and vomiting.   Genitourinary:  Negative for dysuria and hematuria.   Neurological:  Negative for headaches.     Objective   Vitals:    08/28/24 1144   BP: 108/66   Pulse: 79   Resp: 16   SpO2: 98%   Weight: 109 kg (239 lb 6.4 oz)   Height: 170.2 cm (67\")     Body mass index is 37.5 kg/m².    Vitals reviewed.   Constitutional:       Appearance: Well-developed.   Eyes:      Conjunctiva/sclera: Conjunctivae normal.   HENT:      Head: Normocephalic.   Neck:      Thyroid: No thyromegaly.      Vascular: No JVD.      Trachea: No tracheal deviation.   Pulmonary:      Effort: No respiratory distress.      Breath sounds: Normal breath sounds. No wheezing. No rales.   Cardiovascular:      PMI at left midclavicular line. Normal rate. Regular rhythm. Normal S1. Normal S2.       Murmurs: There is no murmur.      No gallop.  No click. No rub.   Pulses:     Intact distal pulses.   Edema:     Peripheral edema absent.   Abdominal:      General: Bowel " sounds are normal.      Palpations: Abdomen is soft. There is no abdominal mass.      Tenderness: There is no abdominal tenderness.   Musculoskeletal:      Cervical back: Normal range of motion and neck supple. Skin:     General: Skin is warm and dry.   Neurological:      Mental Status: Alert and oriented to person, place, and time.      Cranial Nerves: No cranial nerve deficit.       Lab Results   Component Value Date     06/20/2024    K 4.4 06/20/2024     06/20/2024    CO2 21.7 (L) 06/20/2024    BUN 14 06/20/2024    CREATININE 1.09 06/20/2024    GLUCOSE 90 06/20/2024    CALCIUM 8.9 06/20/2024    AST 32 06/20/2024    ALT 26 06/20/2024    ALKPHOS 97 06/20/2024    LABIL2 1.1 (L) 05/29/2016     Lab Results   Component Value Date    CKTOTAL 153 06/20/2019     Lab Results   Component Value Date    WBC 5.72 06/20/2024    HGB 16.2 06/20/2024    HCT 45.9 06/20/2024     06/20/2024     Lab Results   Component Value Date    INR 1.01 12/19/2023    INR 0.96 06/04/2023    INR 0.98 09/24/2019     Lab Results   Component Value Date    MG 2.1 06/20/2024     Lab Results   Component Value Date    TSH 2.670 03/20/2024    PSA 0.6 11/17/2023    CHLPL 232 (H) 04/05/2016    TRIG 74 03/20/2024    HDL 65 (H) 03/20/2024     (H) 03/20/2024      Lab Results   Component Value Date    BNP 3.0 09/26/2018       Assessment & Plan    Diagnosis Plan   1. Precordial pain        2. Essential hypertension        3. Dyslipidemia          Recommendations  I have discussed about doing a CT coronary angiogram to which she seems agreeable.  Will set this up at [facility] in the next few weeks.  Continue with low-dose aspirin for now.    Return in about 4 weeks (around 9/25/2024).    As always, Tiera Valenzuela, BALDOMERO  I appreciate very much the opportunity to participate in the cardiovascular care of your patients. Please do not hesitate to call me with any questions with regards to Jaquan Mckay's evaluation and  management.       With Best Regards,        Leandro Daily MD, FACC    Please note that portions of this note were completed with a voice recognition program.

## 2024-08-28 NOTE — PROGRESS NOTES
Tiera Valenzuela, BALDOMERO  Jaquan Mckay  1970 08/28/2024    Patient Active Problem List   Diagnosis    Gastroesophageal reflux disease without esophagitis    Essential hypertension    Seizures    Hyperlipidemia    Anxiety    Varicocele present on ultrasound of scrotum    Chronic bilateral low back pain with left-sided sciatica    Seasonal allergic rhinitis due to pollen    Mild persistent asthma without complication    Precordial pain    Burning with urination    Congenital coronary artery anomaly    BMI 35.0-35.9,adult    Chondromalacia, knee    Achilles tendinitis of both lower extremities    Muscle spasm of both lower legs    Personal history of allergy to shellfish    Sensation of cold in lower extremity    Varicose vein of leg    Heart palpitations    Generalized anxiety disorder    Chronic elbow pain, right    Unstable angina    ASCVD (arteriosclerotic cardiovascular disease)    Other constipation    Class 2 severe obesity due to excess calories with serious comorbidity and body mass index (BMI) of 36.0 to 36.9 in adult    Bacteremia    Therapeutic opioid-induced constipation (OIC)    Rectal bleeding    Bloating    History of colon polyps    Lateral epicondylitis of right elbow    Psychophysiological insomnia    Chronic rhinitis    Vitamin D deficiency    Renal calculus    Chronic idiopathic constipation    Diarrhea    Bilateral flank pain    BPH without obstruction/lower urinary tract symptoms    Incomplete bladder emptying    Frequency of urination    Mixed stress and urge urinary incontinence       Dear Tiera Valenzuela, APRN:    Subjective     Jaquan Mckay is a 53 y.o. male with the problems as listed above, presents    Chief complaint: Recurrent chest pains.    History of Present Illness: Mr. Colon-year-old is a pleasant 53-year-old  male with no history of known coronary artery disease, has chronic intermittent chest pains.  He is Lexiscan sestamibi study on 6/4/2024 revealed no evidence of  myocardial ischemia but there was mildly decreased LV systolic function.  He is back again with recurrent chest pains which she says have been occurring frequently.  He describes these pains as being felt as sharp pains that seem to occur at random and resolve spontaneously.  They are of moderate intensity with some acidic shortness of breath but no sweating.  His echo Doppler study recently revealed normal LV wall motion and systolic function with an estimated LV ejection fraction of about 61 to 65% with questionable bicuspid arctic valve with no significant stenosis or regurgitation.  He states that his mom and dad had heart attacks in their 60s.    Regadenoson Stress Test with Myocardial Perfusion SPECT (Multi Study)    Accession Number: [id number]   Date of Study: 6/4/24   Ordering Provider: Leandro Daily MD   Clinical Indications: Chest Pain        [facility]  Performing Staff   [facility] [employer], SPECT [facility]: Leandro Daily MD    Tech: Nate Ramirez RN   Support Staff: Ronald Ledesma        Interpretation Summary     A pharmacological stress test was performed using regadenoson without low-level exercise.    Findings consistent with a normal ECG stress test.    Myocardial perfusion imaging indicates a normal myocardial perfusion study with no evidence of ischemia.    Abnormal LV wall motion consistent with mild hypokineses.    Left ventricular ejection fraction is mildly reduced (Calculated EF = 50%).    Impressions are consistent with a low risk study.        Complete Transthoracic Echocardiogram with Complete Doppler and Color Flow    Accession Number: [id number]   Date of Study: 8/7/24   Ordering Provider: Virginie Garcia APRN   Clinical Indications: Dyspnea        [facility]  Performing Staff   Cardiology: Leandro Daily MD    Tech: Aneesh Yip          Clinical Indication    Dyspnea   Dx: Precordial pain [R07.2 (ICD-10-CM)]; Dyspnea on exertion [R06.09 (ICD-10-CM)]  "    Interpretation Summary     Normal left ventricular cavity size and wall thickness noted. All left ventricular wall segments contract normally.    Left ventricular ejection fraction appears to be 61 - 65%.    Left ventricular diastolic function was normal.    The aortic valve is abnormal in structure. A bicuspid aortic valve cannot be excluded. There is mild calcification of the aortic valve.    Trace aortic valve regurgitation is present. No hemodynamically significant aortic valve stenosis is present.    The mitral valve is structurally normal with no significant stenosis present. Trace mitral valve regurgitation is present.    There is no evidence of pericardial effusion. .       Allergies   Allergen Reactions    Ciprofloxacin Anaphylaxis and Hives    Miralax [Polyethylene Glycol] Itching and Rash    Mobic [Meloxicam] Other (See Comments)     Pt states, \"It make my feet and hands go numb and I can't hardly walk.\"     Paxil [Paroxetine Hcl] Shortness Of Breath     Chest pain     Peanut-Containing Drug Products Anaphylaxis    Penicillins Anaphylaxis    Pristiq [Desvenlafaxine Succinate Er] Dizziness    Sulfa Antibiotics Anaphylaxis, Itching and Rash    Doxycycline Hives    Fish-Derived Products Hives    Isosorbide Nitrate Rash     Rash, hives, had to use inhaler.     Movantik [Naloxegol] Rash    Seroquel [Quetiapine] Hives and Rash    Trulance [Plecanatide] Hives    Buspar [Buspirone] Rash    Clarithromycin Rash    Clindamycin/Lincomycin Rash    Codeine Rash    Contrast Dye (Echo Or Unknown Ct/Mr) Itching and Rash    Diltiazem Rash    Flomax [Tamsulosin] Hives    Gabapentin Rash    Iodinated Contrast Media Itching and Rash    Keflex [Cephalexin] Rash    Levocetirizine Rash    Linzess [Linaclotide] Rash    Metoprolol Rash    Prednisone Rash and Other (See Comments)     Face, feet, and legs go completely numb per patient    Robitussin Cough+ Chest Max St [Dextromethorphan-Guaifenesin] Itching    Shrimp " (Diagnostic) Rash    Spironolactone Rash    Viibryd [Vilazodone Hcl] Itching and Rash    Zoloft [Sertraline Hcl] Hives and Itching   :    Current Outpatient Medications:     acetaminophen (TYLENOL) 500 MG tablet, Take 1 tablet by mouth Every 6 (Six) Hours As Needed for Mild Pain., Disp: 30 tablet, Rfl: 2    albuterol sulfate  (90 Base) MCG/ACT inhaler, , Disp: , Rfl:     Alcohol Swabs (Alcohol Wipes) 70 % pads, 1 each 2 (Two) Times a Day., Disp: 100 each, Rfl: 5    aspirin 81 MG EC tablet, Take 1 tablet by mouth Daily., Disp: 90 tablet, Rfl: 2    azelastine (ASTELIN) 0.1 % nasal spray, , Disp: , Rfl:     calcium polycarbophil (FiberCon) 625 MG tablet, Take 1 tablet by mouth 2 (Two) Times a Day., Disp: 60 tablet, Rfl: 3    clobetasol (TEMOVATE) 0.05 % ointment, Apply 1 application topically to the appropriate area as directed 2 (Two) Times a Day., Disp: 60 g, Rfl: 2    colestipol (Colestid) 5 g packet, Take 1 packet by mouth Daily., Disp: 30 each, Rfl: 0    dexlansoprazole (Dexilant) 60 MG capsule, Take 1 capsule by mouth 2 (Two) Times a Day., Disp: 180 capsule, Rfl: 5    dicyclomine (BENTYL) 10 MG capsule, Take 1 capsule by mouth 4 (Four) Times a Day Before Meals & at Bedtime., Disp: 30 capsule, Rfl: 0    Dilantin 100 MG capsule, Take 2 capsules by mouth 2 (Two) Times a Day., Disp: 120 capsule, Rfl: 2    diphenhydrAMINE (Benadryl Allergy) 25 MG tablet, Take 1-2 tablets by mouth Every 8 (Eight) Hours As Needed for Itching (or rash)., Disp: 120 tablet, Rfl: 2    Elastic Bandages & Supports (ACE Ankle Brace) misc, 1 each Daily., Disp: 1 each, Rfl: 0    fluticasone (Flovent HFA) 110 MCG/ACT inhaler, Inhale 1 puff 2 (Two) Times a Day., Disp: 12 g, Rfl: 1    guaiFENesin (Mucinex) 600 MG 12 hr tablet, Take 2 tablets by mouth 2 (Two) Times a Day., Disp: 20 tablet, Rfl: 0    HYDROcodone-acetaminophen (Norco) 5-325 MG per tablet, Take 1-1.5 tablets by mouth Every 8 (Eight) Hours As Needed for Moderate Pain., Disp: 12  tablet, Rfl: 0    lisinopril (PRINIVIL,ZESTRIL) 30 MG tablet, Take 1 tablet by mouth Daily. FOR BLOOD PRESSURE, Disp: 90 tablet, Rfl: 2    loratadine (CLARITIN) 10 MG tablet, Take 1 tablet by mouth Daily As Needed for Allergies., Disp: 90 tablet, Rfl: 2    mirtazapine (REMERON) 30 MG tablet, Take 1.5 tablets by mouth Every Night., Disp: 45 tablet, Rfl: 5    montelukast (SINGULAIR) 10 MG tablet, Take 1 tablet by mouth., Disp: , Rfl:     multivitamin with minerals tablet tablet, Take 1 tablet by mouth Daily., Disp: 30 tablet, Rfl: 12    ondansetron (Zofran) 4 MG tablet, Take 1 tablet by mouth Every 8 (Eight) Hours As Needed for Nausea., Disp: 20 tablet, Rfl: 0    rosuvastatin (CRESTOR) 40 MG tablet, Take 1 tablet by mouth Daily., Disp: 90 tablet, Rfl: 2    senna (Senokot) 8.6 MG tablet, Take 2 tablets by mouth 2 (Two) Times a Day., Disp: 120 tablet, Rfl: 0    tamsulosin (FLOMAX) 0.4 MG capsule 24 hr capsule, Take 1 capsule by mouth Daily., Disp: 90 capsule, Rfl: 3    temazepam (Restoril) 15 MG capsule, Take 1 capsule by mouth At Night As Needed for Sleep., Disp: 30 capsule, Rfl: 0    TiZANidine (Zanaflex) 4 MG capsule, Take 1 capsule by mouth 3 (Three) Times a Day., Disp: 30 capsule, Rfl: 5    vitamin D (ERGOCALCIFEROL) 1.25 MG (00013 UT) capsule capsule, Take 1 capsule by mouth Every 7 (Seven) Days., Disp: 12 capsule, Rfl: 2    The following portions of the patient's history were reviewed and updated as appropriate: allergies, current medications, past family history, past medical history, past social history, past surgical history and problem list.    Social History     Tobacco Use    Smoking status: Every Day     Current packs/day: 1.00     Average packs/day: 1 pack/day for 17.5 years (17.5 ttl pk-yrs)     Types: Cigars, Cigarettes     Start date: 4/11/2022     Passive exposure: Current    Smokeless tobacco: Never   Vaping Use    Vaping status: Never Used   Substance Use Topics    Alcohol use: No    Drug use: No  "    Review of Systems   Constitutional: Negative for chills and fever.   HENT:  Negative for nosebleeds and sore throat.    Cardiovascular:  Positive for chest pain.   Respiratory:  Negative for cough, hemoptysis and wheezing.    Gastrointestinal:  Negative for abdominal pain, hematemesis, hematochezia, melena, nausea and vomiting.   Genitourinary:  Negative for dysuria and hematuria.   Neurological:  Negative for headaches.     Objective   Vitals:    08/28/24 1144   BP: 108/66   Pulse: 79   Resp: 16   SpO2: 98%   Weight: 109 kg (239 lb 6.4 oz)   Height: 170.2 cm (67\")     Body mass index is 37.5 kg/m².    Vitals reviewed.   Constitutional:       Appearance: Well-developed.   Eyes:      Conjunctiva/sclera: Conjunctivae normal.   HENT:      Head: Normocephalic.   Neck:      Thyroid: No thyromegaly.      Vascular: No JVD.      Trachea: No tracheal deviation.   Pulmonary:      Effort: No respiratory distress.      Breath sounds: Normal breath sounds. No wheezing. No rales.   Cardiovascular:      PMI at left midclavicular line. Normal rate. Regular rhythm. Normal S1. Normal S2.       Murmurs: There is no murmur.      No gallop.  No click. No rub.   Pulses:     Intact distal pulses.   Edema:     Peripheral edema absent.   Abdominal:      General: Bowel sounds are normal.      Palpations: Abdomen is soft. There is no abdominal mass.      Tenderness: There is no abdominal tenderness.   Musculoskeletal:      Cervical back: Normal range of motion and neck supple. Skin:     General: Skin is warm and dry.   Neurological:      Mental Status: Alert and oriented to person, place, and time.      Cranial Nerves: No cranial nerve deficit.       Lab Results   Component Value Date     06/20/2024    K 4.4 06/20/2024     06/20/2024    CO2 21.7 (L) 06/20/2024    BUN 14 06/20/2024    CREATININE 1.09 06/20/2024    GLUCOSE 90 06/20/2024    CALCIUM 8.9 06/20/2024    AST 32 06/20/2024    ALT 26 06/20/2024    ALKPHOS 97 06/20/2024 "    LABIL2 1.1 (L) 05/29/2016     Lab Results   Component Value Date    CKTOTAL 153 06/20/2019     Lab Results   Component Value Date    WBC 5.72 06/20/2024    HGB 16.2 06/20/2024    HCT 45.9 06/20/2024     06/20/2024     Lab Results   Component Value Date    INR 1.01 12/19/2023    INR 0.96 06/04/2023    INR 0.98 09/24/2019     Lab Results   Component Value Date    MG 2.1 06/20/2024     Lab Results   Component Value Date    TSH 2.670 03/20/2024    PSA 0.6 11/17/2023    CHLPL 232 (H) 04/05/2016    TRIG 74 03/20/2024    HDL 65 (H) 03/20/2024     (H) 03/20/2024      Lab Results   Component Value Date    BNP 3.0 09/26/2018       Assessment & Plan    Diagnosis Plan   1. Precordial pain        2. Essential hypertension        3. Dyslipidemia          Recommendations  I have discussed about doing a CT coronary angiogram to which she seems agreeable.  Will set this up at [facility] in the next few weeks.  Continue with low-dose aspirin for now.    Return in about 4 weeks (around 9/25/2024).    As always, Tiera Valenzuela, BALDOMERO  I appreciate very much the opportunity to participate in the cardiovascular care of your patients. Please do not hesitate to call me with any questions with regards to Jaquan Mckay's evaluation and management.       With Best Regards,        Leandro Daily MD, FACC    Please note that portions of this note were completed with a voice recognition program.

## 2024-08-29 ENCOUNTER — TRANSCRIBE ORDERS (OUTPATIENT)
Dept: PHYSICAL THERAPY | Facility: HOSPITAL | Age: 54
End: 2024-08-29
Payer: COMMERCIAL

## 2024-08-29 DIAGNOSIS — S83.421A TEAR OF LCL (LATERAL COLLATERAL LIGAMENT) OF KNEE, RIGHT, INITIAL ENCOUNTER: Primary | ICD-10-CM

## 2024-09-06 ENCOUNTER — HOSPITAL ENCOUNTER (OUTPATIENT)
Dept: PHYSICAL THERAPY | Facility: HOSPITAL | Age: 54
Setting detail: THERAPIES SERIES
Discharge: HOME | End: 2024-09-06
Payer: COMMERCIAL

## 2024-09-06 ENCOUNTER — LAB (OUTPATIENT)
Dept: LAB | Facility: HOSPITAL | Age: 54
End: 2024-09-06
Payer: COMMERCIAL

## 2024-09-06 ENCOUNTER — TRANSCRIBE ORDERS (OUTPATIENT)
Dept: ADMINISTRATIVE | Facility: HOSPITAL | Age: 54
End: 2024-09-06
Payer: COMMERCIAL

## 2024-09-06 DIAGNOSIS — R06.2 WHEEZING: ICD-10-CM

## 2024-09-06 DIAGNOSIS — T63.91XA: ICD-10-CM

## 2024-09-06 DIAGNOSIS — T63.91XA: Primary | ICD-10-CM

## 2024-09-06 PROCEDURE — 86003 ALLG SPEC IGE CRUDE XTRC EA: CPT

## 2024-09-06 PROCEDURE — 36415 COLL VENOUS BLD VENIPUNCTURE: CPT

## 2024-09-09 ENCOUNTER — HOSPITAL ENCOUNTER (OUTPATIENT)
Dept: PHYSICAL THERAPY | Facility: HOSPITAL | Age: 54
Setting detail: THERAPIES SERIES
Discharge: HOME | End: 2024-09-09
Payer: COMMERCIAL

## 2024-09-09 DIAGNOSIS — G89.29 CHRONIC PAIN OF RIGHT ANKLE: Primary | ICD-10-CM

## 2024-09-09 DIAGNOSIS — M25.571 CHRONIC PAIN OF RIGHT ANKLE: Primary | ICD-10-CM

## 2024-09-09 PROCEDURE — 97162 PT EVAL MOD COMPLEX 30 MIN: CPT | Performed by: PHYSICAL THERAPIST

## 2024-09-09 NOTE — THERAPY EVALUATION
"    Physical Therapy Initial Evaluation and Plan of Care    Patient: Jaquan Mckay   : 1970  Diagnosis/ICD-10 Code:    Referring practitioner: Khurram Florez DPM  Date of Initial Visit: 2024  Today's Date: 2024  Patient seen for 1 session         Visit Diagnoses:    ICD-10-CM ICD-9-CM   1. Chronic pain of right ankle  M25.571 719.47    G89.29 338.29         Subjective Questionnaire: LEFS: 69%      Subjective Evaluation    History of Present Illness  Onset date: 3 months.  Mechanism of injury: The patient suffered a fall three months ago that resulted in a right ankle injury.  He had difficulty with swelling and pain in his ankle following the injury and has suffered eight additional falls since the initial injury.  This has resulted in increased pain and \"popping\" in the ankle.  The patient was evaluated by his MD around two weeks ago, and was advised to receive therapy for his pain.      Precautions and Work Restrictions: HTN,Quality of life: fair    Pain  Current pain ratin  At best pain ratin  At worst pain rating: 10  Location: rigth ankle  Quality: burning and pulling  Relieving factors: rest and medications  Aggravating factors: repetitive movement, ambulation, standing, stairs and movement  Progression: worsening    Hand dominance: right    Diagnostic Tests  X-ray: normal    Patient Goals  Patient goals for therapy: decreased edema, decreased pain, improved balance, increased motion, return to sport/leisure activities, independence with ADLs/IADLs and increased strength             Objective          Observations     Additional Ankle/Foot Observation Details  Edema noted to lateral right ankle    Palpation     Right Tenderness of the lateral gastrocnemius and medial gastrocnemius.     Tenderness     Right Ankle/Foot   Tenderness in the Achilles insertion, anterior ankle, calcaneofibular ligament, lateral malleolus, medial malleolus and plantar fascia.     Neurological Testing "     Sensation     Ankle/Foot   Left Ankle/Foot   Diminished: light touch    Right Ankle/Foot   Diminished: light touch    Active Range of Motion     Right Ankle/Foot   Plantar flexion: 45 degrees   Inversion: 20 degrees   Eversion: 10 degrees     Additional Active Range of Motion Details  Right ankle lacks 10 degrees from neutral for DF    Strength/Myotome Testing     Right Ankle/Foot   Dorsiflexion: 4-  Plantar flexion: 4-  Inversion: 3+  Eversion: 3+    Ambulation     Comments   Pt amb with walking boot with decreased stance on right          Assessment & Plan       Assessment  Impairments: abnormal coordination, abnormal gait, abnormal muscle firing, abnormal muscle tone, abnormal or restricted ROM, activity intolerance, impaired balance, impaired physical strength, lacks appropriate home exercise program and pain with function   Functional limitations: walking, uncomfortable because of pain, standing, reaching overhead and unable to perform repetitive tasks   Assessment details: Pt is a 52 y/o male referred to therapy for treatment of right ankle pain.  Pt presents with decreased ankle ROM, ankle instability and ankle pain.  Therapy will follow for improved ankle stability and decreased pain.  Prognosis: good    Goals  Plan Goals: STG 6 weeks    1 Pt will be instructed in a HEP.  2 Pt will improve his LEFs to less than 50%.  3 Pt will demonstrate 4-/5 gross right ankle strength.    LTG 12 weeks    1 Pt will report pain no greater than 4/10 with ADLs.  2 Pt will demonstrate a 15 degree improvement with right ankle DF.  3 Pt will demonstrate 4/5 gross right ankle strength.        Plan  Therapy options: will be seen for skilled therapy services  Planned modality interventions: cryotherapy, TENS, thermotherapy (hydrocollator packs) and ultrasound  Planned therapy interventions: ADL retraining, balance/weight-bearing training, body mechanics training, flexibility, gait training, functional ROM exercises, home  exercise program, IADL retraining, joint mobilization, manual therapy, motor coordination training, neuromuscular re-education, soft tissue mobilization, strengthening, stretching and therapeutic activities  Frequency: 2x week  Duration in weeks: 12  Treatment plan discussed with: patient  Plan details: Will follow for optimal gains.  Moderate Evaluation  67716  Re-evaluation   72693  Therapeutic exercise  05762  Therapeutic activity    66516  Neuromuscular re-education   40875  Manual therapy   60952  Gait training  15602    Unattended e-stim (Medicaid/Medicare)     Moist heat/cryotherapy 67057   Ultrasound   96356  Iontophoresis   90594              Timed:         Manual Therapy:         mins  95174;     Therapeutic Exercise:         mins  71595;     Neuromuscular Jazmin:        mins  47438;    Therapeutic Activity:          mins  69576;     Gait Training:           mins  36683;     Ultrasound:          mins  84606;    Ionto                                   mins   84383  Self Care                            mins   12890  Canalith Repos         mins 44997      Un-Timed:  Electrical Stimulation:         mins  96532 ( );  Dry Needling          mins self-pay  Traction          mins 64054  Low Eval          Mins  11436  Mod Eval     45     Mins  99851  High Eval                            Mins  65356        Timed Treatment:      mins   Total Treatment:     45   mins          PT: Win Rodriguez PT     License Number: [id number]  Electronically signed by Win Rodriguez PT, 09/09/24, 2:42 PM EDT    Certification Period: 9/9/2024 thru 12/7/2024  I certify that the therapy services are furnished while this patient is under my care.  The services outlined above are required by this patient, and will be reviewed every 90 days.         Physician Signature:__________________________________________________    PHYSICIAN: Khurram Florez DPM  NPI: [id number]                                       DATE:      Please sign and return via fax to [email or web address] . Thank you, [facility].

## 2024-09-10 LAB
HONEY BEE IGE QN: 0.87 KU/L
WHITEFACED HORNET IGE QN: 3.1 KU/L

## 2024-09-11 ENCOUNTER — APPOINTMENT (OUTPATIENT)
Dept: PHYSICAL THERAPY | Facility: HOSPITAL | Age: 54
End: 2024-09-11
Payer: COMMERCIAL

## 2024-09-12 LAB
PAPER WASP IGE QN: 2.83 KU/L
YELLOW HORNET IGE QN: 1.51 KU/L
YELLOW JACKET IGE QN: 1.84 KU/L

## 2024-09-13 ENCOUNTER — HOSPITAL ENCOUNTER (OUTPATIENT)
Dept: PHYSICAL THERAPY | Facility: HOSPITAL | Age: 54
Setting detail: THERAPIES SERIES
Discharge: HOME | End: 2024-09-13
Payer: COMMERCIAL

## 2024-09-13 DIAGNOSIS — M25.571 CHRONIC PAIN OF RIGHT ANKLE: Primary | ICD-10-CM

## 2024-09-13 DIAGNOSIS — G89.29 CHRONIC PAIN OF RIGHT ANKLE: Primary | ICD-10-CM

## 2024-09-13 PROCEDURE — 97112 NEUROMUSCULAR REEDUCATION: CPT | Performed by: PHYSICAL THERAPIST

## 2024-09-13 PROCEDURE — 97110 THERAPEUTIC EXERCISES: CPT | Performed by: PHYSICAL THERAPIST

## 2024-09-13 NOTE — PROGRESS NOTES
Physical Therapy Daily Treatment Note      Patient: Jaquan Mckay   : 1970  Referring practitioner: No Known Provider  Date of Initial Visit: Type: THERAPY  Episode: Right ankle pain  Today's Date: 2024  Patient seen for 2 sessions       Visit Diagnoses:    ICD-10-CM ICD-9-CM   1. Chronic pain of right ankle  M25.571 719.47    G89.29 338.29       Subjective Evaluation    History of Present Illness    Subjective comment: Patient reports 8/10 pain today.  He mentions that he has trouble with his balance.Pain  Current pain ratin           Objective   See Exercise, Manual, and Modality Logs for complete treatment.       Assessment & Plan       Assessment  Assessment details: Patient tolerated today's session well, noting no increase in pain post-tx.  Therapy session initiated with , followed by there ex and neuromuscular re-education.  Exercises focused on ankle ROM, stretching, and strengthening.  Patient educated to perform exercises per his tolerance, with patient verbalizing understanding.  Increased postural sway was noted with semi-tandem stance, with patient requiring intermittent UE support to help correct balance.  He will continue to be progressed per his tolerance and POC.          Timed:         Manual Therapy:         mins  13582;     Therapeutic Exercise:    27     mins  01683;     Neuromuscular Jazmin:   12     mins  57423;    Therapeutic Activity:          mins  79515;     Gait Training:           mins  72460;     Ultrasound:          mins  25009;    Ionto                                   mins   13193  Self Care                            mins   69733      Un-Timed:  Electrical Stimulation:         mins  13143 ( );  Dry Needling          mins self-pay  Traction          mins 57931  Canalith Repos         mins 51666  MH: 5 min    Timed Treatment:  39   mins   Total Treatment:     44   mins    Hanna Burks, PT  [place] License: [id number]  Electronically signed by Hanna Burks,  PT, 09/13/24, 9:48 AM EDT.                       17-Jan-2022 16:32

## 2024-09-16 ENCOUNTER — OFFICE VISIT (OUTPATIENT)
Dept: FAMILY MEDICINE CLINIC | Facility: CLINIC | Age: 54
End: 2024-09-16
Payer: COMMERCIAL

## 2024-09-16 ENCOUNTER — HOSPITAL ENCOUNTER (OUTPATIENT)
Dept: PHYSICAL THERAPY | Facility: HOSPITAL | Age: 54
Setting detail: THERAPIES SERIES
Discharge: HOME | End: 2024-09-16
Payer: COMMERCIAL

## 2024-09-16 VITALS
DIASTOLIC BLOOD PRESSURE: 80 MMHG | RESPIRATION RATE: 16 BRPM | BODY MASS INDEX: 37.35 KG/M2 | HEIGHT: 67 IN | SYSTOLIC BLOOD PRESSURE: 112 MMHG | HEART RATE: 84 BPM | OXYGEN SATURATION: 97 % | WEIGHT: 238 LBS

## 2024-09-16 DIAGNOSIS — M25.571 CHRONIC PAIN OF RIGHT ANKLE: Primary | ICD-10-CM

## 2024-09-16 DIAGNOSIS — B35.3 TINEA PEDIS OF BOTH FEET: ICD-10-CM

## 2024-09-16 DIAGNOSIS — J45.30 MILD PERSISTENT ASTHMA WITHOUT COMPLICATION: Primary | ICD-10-CM

## 2024-09-16 DIAGNOSIS — G89.29 CHRONIC PAIN OF RIGHT ANKLE: Primary | ICD-10-CM

## 2024-09-16 DIAGNOSIS — R10.30 LOWER ABDOMINAL PAIN: ICD-10-CM

## 2024-09-16 DIAGNOSIS — R19.4 BOWEL HABIT CHANGES: ICD-10-CM

## 2024-09-16 DIAGNOSIS — I10 ESSENTIAL HYPERTENSION: ICD-10-CM

## 2024-09-16 PROCEDURE — 97110 THERAPEUTIC EXERCISES: CPT | Performed by: PHYSICAL THERAPIST

## 2024-09-16 PROCEDURE — 1160F RVW MEDS BY RX/DR IN RCRD: CPT | Performed by: NURSE PRACTITIONER

## 2024-09-16 PROCEDURE — 1125F AMNT PAIN NOTED PAIN PRSNT: CPT | Performed by: NURSE PRACTITIONER

## 2024-09-16 PROCEDURE — 3079F DIAST BP 80-89 MM HG: CPT | Performed by: NURSE PRACTITIONER

## 2024-09-16 PROCEDURE — 97112 NEUROMUSCULAR REEDUCATION: CPT | Performed by: PHYSICAL THERAPIST

## 2024-09-16 PROCEDURE — 3074F SYST BP LT 130 MM HG: CPT | Performed by: NURSE PRACTITIONER

## 2024-09-16 PROCEDURE — 99214 OFFICE O/P EST MOD 30 MIN: CPT | Performed by: NURSE PRACTITIONER

## 2024-09-16 PROCEDURE — 1159F MED LIST DOCD IN RCRD: CPT | Performed by: NURSE PRACTITIONER

## 2024-09-16 RX ORDER — MICONAZOLE NITRATE 20 MG/G
1 CREAM TOPICAL 2 TIMES DAILY
Qty: 30 G | Refills: 0 | Status: SHIPPED | OUTPATIENT
Start: 2024-09-16

## 2024-09-16 RX ORDER — FLUTICASONE PROPIONATE 110 UG/1
1 AEROSOL, METERED RESPIRATORY (INHALATION) 2 TIMES DAILY
Qty: 12 G | Refills: 5 | Status: SHIPPED | OUTPATIENT
Start: 2024-09-16

## 2024-09-18 ENCOUNTER — TELEPHONE (OUTPATIENT)
Dept: CARDIOLOGY | Facility: CLINIC | Age: 54
End: 2024-09-18

## 2024-09-18 NOTE — TELEPHONE ENCOUNTER
Caller: Jaquan Mckay    Relationship: Self    Best call back number: [phone]    What is the best time to reach you: ANY    What was the call regarding: PATIENT WAS ADVISED HE WOULD BE CALLED ABOUT THE SCHEDULING OF THE CT. PLEASE CALL THE PATIENT TO ADVISE.     Is it okay if the provider responds through MyChart: NO

## 2024-09-19 NOTE — TELEPHONE ENCOUNTER
Emiliano to relay.     Called pt to advise him that I have advised  and he is going to put the order in. No answer MARTHA.

## 2024-09-20 ENCOUNTER — HOSPITAL ENCOUNTER (OUTPATIENT)
Dept: PHYSICAL THERAPY | Facility: HOSPITAL | Age: 54
Setting detail: THERAPIES SERIES
Discharge: HOME | End: 2024-09-20
Payer: COMMERCIAL

## 2024-09-20 DIAGNOSIS — R26.89 ANTALGIC GAIT: ICD-10-CM

## 2024-09-20 DIAGNOSIS — M25.572 CHRONIC PAIN OF LEFT ANKLE: ICD-10-CM

## 2024-09-20 DIAGNOSIS — G89.29 CHRONIC PAIN OF LEFT ANKLE: ICD-10-CM

## 2024-09-20 DIAGNOSIS — M25.672 DECREASED RANGE OF MOTION OF LEFT ANKLE: ICD-10-CM

## 2024-09-20 DIAGNOSIS — S86.312D PERONEAL TENDON TEAR, LEFT, SUBSEQUENT ENCOUNTER: Primary | ICD-10-CM

## 2024-09-20 PROCEDURE — 97110 THERAPEUTIC EXERCISES: CPT | Performed by: PHYSICAL THERAPIST

## 2024-09-20 PROCEDURE — 97112 NEUROMUSCULAR REEDUCATION: CPT | Performed by: PHYSICAL THERAPIST

## 2024-09-23 ENCOUNTER — APPOINTMENT (OUTPATIENT)
Dept: GENERAL RADIOLOGY | Facility: HOSPITAL | Age: 54
End: 2024-09-23
Payer: COMMERCIAL

## 2024-09-23 ENCOUNTER — APPOINTMENT (OUTPATIENT)
Dept: PHYSICAL THERAPY | Facility: HOSPITAL | Age: 54
End: 2024-09-23
Payer: COMMERCIAL

## 2024-09-23 ENCOUNTER — HOSPITAL ENCOUNTER (EMERGENCY)
Facility: HOSPITAL | Age: 54
Discharge: HOME | End: 2024-09-23
Attending: STUDENT IN AN ORGANIZED HEALTH CARE EDUCATION/TRAINING PROGRAM | Admitting: STUDENT IN AN ORGANIZED HEALTH CARE EDUCATION/TRAINING PROGRAM
Payer: COMMERCIAL

## 2024-09-23 VITALS
DIASTOLIC BLOOD PRESSURE: 60 MMHG | OXYGEN SATURATION: 96 % | RESPIRATION RATE: 18 BRPM | WEIGHT: 238 LBS | HEIGHT: 67 IN | TEMPERATURE: 97.4 F | SYSTOLIC BLOOD PRESSURE: 115 MMHG | BODY MASS INDEX: 37.35 KG/M2 | HEART RATE: 84 BPM

## 2024-09-23 DIAGNOSIS — J45.901 MILD ASTHMA WITH EXACERBATION, UNSPECIFIED WHETHER PERSISTENT: Primary | ICD-10-CM

## 2024-09-23 LAB
ALBUMIN SERPL-MCNC: 4.1 G/DL (ref 3.5–5.2)
ALBUMIN/GLOB SERPL: 1.1 G/DL
ALP SERPL-CCNC: 99 U/L (ref 39–117)
ALT SERPL W P-5'-P-CCNC: 24 U/L (ref 1–41)
ANION GAP SERPL CALCULATED.3IONS-SCNC: 14.2 MMOL/L (ref 5–15)
AST SERPL-CCNC: 27 U/L (ref 1–40)
BASOPHILS # BLD AUTO: 0.02 10*3/MM3 (ref 0–0.2)
BASOPHILS NFR BLD AUTO: 0.3 % (ref 0–1.5)
BILIRUB SERPL-MCNC: 0.4 MG/DL (ref 0–1.2)
BUN SERPL-MCNC: 14 MG/DL (ref 6–20)
BUN/CREAT SERPL: 12.7 (ref 7–25)
CALCIUM SPEC-SCNC: 9.2 MG/DL (ref 8.6–10.5)
CHLORIDE SERPL-SCNC: 101 MMOL/L (ref 98–107)
CO2 SERPL-SCNC: 20.8 MMOL/L (ref 22–29)
CREAT SERPL-MCNC: 1.1 MG/DL (ref 0.76–1.27)
D DIMER PPP FEU-MCNC: 0.31 MCGFEU/ML (ref 0–0.51)
DEPRECATED RDW RBC AUTO: 41.8 FL (ref 37–54)
EGFRCR SERPLBLD CKD-EPI 2021: 81.3 ML/MIN/1.73
EOSINOPHIL # BLD AUTO: 0.07 10*3/MM3 (ref 0–0.4)
EOSINOPHIL NFR BLD AUTO: 0.9 % (ref 0.3–6.2)
ERYTHROCYTE [DISTWIDTH] IN BLOOD BY AUTOMATED COUNT: 12.5 % (ref 12.3–15.4)
GEN 5 2HR TROPONIN T REFLEX: <6 NG/L
GLOBULIN UR ELPH-MCNC: 3.9 GM/DL
GLUCOSE SERPL-MCNC: 107 MG/DL (ref 65–99)
HCT VFR BLD AUTO: 46.3 % (ref 37.5–51)
HGB BLD-MCNC: 16 G/DL (ref 13–17.7)
HOLD SPECIMEN: NORMAL
HOLD SPECIMEN: NORMAL
IMM GRANULOCYTES # BLD AUTO: 0.01 10*3/MM3 (ref 0–0.05)
IMM GRANULOCYTES NFR BLD AUTO: 0.1 % (ref 0–0.5)
INR PPP: 1 (ref 0.9–1.1)
LYMPHOCYTES # BLD AUTO: 2.3 10*3/MM3 (ref 0.7–3.1)
LYMPHOCYTES NFR BLD AUTO: 29.7 % (ref 19.6–45.3)
MCH RBC QN AUTO: 31.7 PG (ref 26.6–33)
MCHC RBC AUTO-ENTMCNC: 34.6 G/DL (ref 31.5–35.7)
MCV RBC AUTO: 91.7 FL (ref 79–97)
MONOCYTES # BLD AUTO: 0.82 10*3/MM3 (ref 0.1–0.9)
MONOCYTES NFR BLD AUTO: 10.6 % (ref 5–12)
NEUTROPHILS NFR BLD AUTO: 4.52 10*3/MM3 (ref 1.7–7)
NEUTROPHILS NFR BLD AUTO: 58.4 % (ref 42.7–76)
NRBC BLD AUTO-RTO: 0 /100 WBC (ref 0–0.2)
PLATELET # BLD AUTO: 204 10*3/MM3 (ref 140–450)
PMV BLD AUTO: 9.7 FL (ref 6–12)
POTASSIUM SERPL-SCNC: 3.6 MMOL/L (ref 3.5–5.2)
PROT SERPL-MCNC: 8 G/DL (ref 6–8.5)
PROTHROMBIN TIME: 13.3 SECONDS (ref 12.1–14.7)
RBC # BLD AUTO: 5.05 10*6/MM3 (ref 4.14–5.8)
SODIUM SERPL-SCNC: 136 MMOL/L (ref 136–145)
TROPONIN T DELTA: NORMAL
TROPONIN T SERPL HS-MCNC: 7 NG/L
WBC NRBC COR # BLD AUTO: 7.74 10*3/MM3 (ref 3.4–10.8)
WHOLE BLOOD HOLD COAG: NORMAL
WHOLE BLOOD HOLD SPECIMEN: NORMAL

## 2024-09-23 PROCEDURE — 84484 ASSAY OF TROPONIN QUANT: CPT | Performed by: STUDENT IN AN ORGANIZED HEALTH CARE EDUCATION/TRAINING PROGRAM

## 2024-09-23 PROCEDURE — 25810000003 SODIUM CHLORIDE 0.9 % SOLUTION: Performed by: STUDENT IN AN ORGANIZED HEALTH CARE EDUCATION/TRAINING PROGRAM

## 2024-09-23 PROCEDURE — 71045 X-RAY EXAM CHEST 1 VIEW: CPT | Performed by: RADIOLOGY

## 2024-09-23 PROCEDURE — 85025 COMPLETE CBC W/AUTO DIFF WBC: CPT | Performed by: STUDENT IN AN ORGANIZED HEALTH CARE EDUCATION/TRAINING PROGRAM

## 2024-09-23 PROCEDURE — 25010000002 MAGNESIUM SULFATE 2 GM/50ML SOLUTION: Performed by: STUDENT IN AN ORGANIZED HEALTH CARE EDUCATION/TRAINING PROGRAM

## 2024-09-23 PROCEDURE — 96365 THER/PROPH/DIAG IV INF INIT: CPT

## 2024-09-23 PROCEDURE — 80053 COMPREHEN METABOLIC PANEL: CPT | Performed by: STUDENT IN AN ORGANIZED HEALTH CARE EDUCATION/TRAINING PROGRAM

## 2024-09-23 PROCEDURE — 85610 PROTHROMBIN TIME: CPT | Performed by: STUDENT IN AN ORGANIZED HEALTH CARE EDUCATION/TRAINING PROGRAM

## 2024-09-23 PROCEDURE — 25010000002 DEXAMETHASONE PER 1 MG: Performed by: STUDENT IN AN ORGANIZED HEALTH CARE EDUCATION/TRAINING PROGRAM

## 2024-09-23 PROCEDURE — 94799 UNLISTED PULMONARY SVC/PX: CPT

## 2024-09-23 PROCEDURE — 96366 THER/PROPH/DIAG IV INF ADDON: CPT

## 2024-09-23 PROCEDURE — 99284 EMERGENCY DEPT VISIT MOD MDM: CPT

## 2024-09-23 PROCEDURE — 71045 X-RAY EXAM CHEST 1 VIEW: CPT

## 2024-09-23 PROCEDURE — 94640 AIRWAY INHALATION TREATMENT: CPT

## 2024-09-23 PROCEDURE — 93010 ELECTROCARDIOGRAM REPORT: CPT | Performed by: INTERNAL MEDICINE

## 2024-09-23 PROCEDURE — 85379 FIBRIN DEGRADATION QUANT: CPT | Performed by: STUDENT IN AN ORGANIZED HEALTH CARE EDUCATION/TRAINING PROGRAM

## 2024-09-23 PROCEDURE — 96368 THER/DIAG CONCURRENT INF: CPT

## 2024-09-23 PROCEDURE — 36415 COLL VENOUS BLD VENIPUNCTURE: CPT

## 2024-09-23 PROCEDURE — 93005 ELECTROCARDIOGRAM TRACING: CPT | Performed by: STUDENT IN AN ORGANIZED HEALTH CARE EDUCATION/TRAINING PROGRAM

## 2024-09-23 RX ORDER — SODIUM CHLORIDE 0.9 % (FLUSH) 0.9 %
10 SYRINGE (ML) INJECTION AS NEEDED
Status: DISCONTINUED | OUTPATIENT
Start: 2024-09-23 | End: 2024-09-24 | Stop reason: HOSPADM

## 2024-09-23 RX ORDER — MAGNESIUM SULFATE HEPTAHYDRATE 40 MG/ML
2 INJECTION, SOLUTION INTRAVENOUS ONCE
Status: COMPLETED | OUTPATIENT
Start: 2024-09-23 | End: 2024-09-23

## 2024-09-23 RX ORDER — IPRATROPIUM BROMIDE AND ALBUTEROL SULFATE 2.5; .5 MG/3ML; MG/3ML
3 SOLUTION RESPIRATORY (INHALATION) ONCE
Status: COMPLETED | OUTPATIENT
Start: 2024-09-23 | End: 2024-09-23

## 2024-09-23 RX ADMIN — SODIUM CHLORIDE 500 ML: 9 INJECTION, SOLUTION INTRAVENOUS at 18:19

## 2024-09-23 RX ADMIN — MAGNESIUM SULFATE HEPTAHYDRATE 2 G: 40 INJECTION, SOLUTION INTRAVENOUS at 20:03

## 2024-09-23 RX ADMIN — DEXAMETHASONE SODIUM PHOSPHATE 12 MG: 4 INJECTION, SOLUTION INTRA-ARTICULAR; INTRALESIONAL; INTRAMUSCULAR; INTRAVENOUS; SOFT TISSUE at 20:04

## 2024-09-23 RX ADMIN — IPRATROPIUM BROMIDE AND ALBUTEROL SULFATE 3 ML: 2.5; .5 SOLUTION RESPIRATORY (INHALATION) at 18:27

## 2024-09-23 NOTE — TELEPHONE ENCOUNTER
Please check with Dr. Daily because the patient has a dye allergy listed. He would have to be premedicated.

## 2024-09-24 LAB
QT INTERVAL: 314 MS
QTC INTERVAL: 409 MS

## 2024-09-27 ENCOUNTER — APPOINTMENT (OUTPATIENT)
Dept: PHYSICAL THERAPY | Facility: HOSPITAL | Age: 54
End: 2024-09-27
Payer: COMMERCIAL

## 2024-09-30 ENCOUNTER — OFFICE VISIT (OUTPATIENT)
Dept: FAMILY MEDICINE CLINIC | Facility: CLINIC | Age: 54
End: 2024-09-30
Payer: COMMERCIAL

## 2024-09-30 ENCOUNTER — APPOINTMENT (OUTPATIENT)
Dept: PHYSICAL THERAPY | Facility: HOSPITAL | Age: 54
End: 2024-09-30
Payer: COMMERCIAL

## 2024-09-30 VITALS
SYSTOLIC BLOOD PRESSURE: 102 MMHG | DIASTOLIC BLOOD PRESSURE: 72 MMHG | BODY MASS INDEX: 36.95 KG/M2 | RESPIRATION RATE: 16 BRPM | HEIGHT: 67 IN | OXYGEN SATURATION: 97 % | WEIGHT: 235.4 LBS | HEART RATE: 84 BPM

## 2024-09-30 DIAGNOSIS — Z09 ENCOUNTER FOR EXAMINATION FOLLOWING TREATMENT AT HOSPITAL: Primary | ICD-10-CM

## 2024-09-30 DIAGNOSIS — I10 ESSENTIAL HYPERTENSION: ICD-10-CM

## 2024-09-30 DIAGNOSIS — J45.31 MILD PERSISTENT ASTHMA WITH ACUTE EXACERBATION: ICD-10-CM

## 2024-09-30 DIAGNOSIS — J30.1 SEASONAL ALLERGIC RHINITIS DUE TO POLLEN: ICD-10-CM

## 2024-09-30 PROCEDURE — 1159F MED LIST DOCD IN RCRD: CPT | Performed by: NURSE PRACTITIONER

## 2024-09-30 PROCEDURE — 3074F SYST BP LT 130 MM HG: CPT | Performed by: NURSE PRACTITIONER

## 2024-09-30 PROCEDURE — 3078F DIAST BP <80 MM HG: CPT | Performed by: NURSE PRACTITIONER

## 2024-09-30 PROCEDURE — 1125F AMNT PAIN NOTED PAIN PRSNT: CPT | Performed by: NURSE PRACTITIONER

## 2024-09-30 PROCEDURE — 1160F RVW MEDS BY RX/DR IN RCRD: CPT | Performed by: NURSE PRACTITIONER

## 2024-09-30 PROCEDURE — 99214 OFFICE O/P EST MOD 30 MIN: CPT | Performed by: NURSE PRACTITIONER

## 2024-09-30 RX ORDER — DEXTROMETHORPHAN HYDROBROMIDE AND PROMETHAZINE HYDROCHLORIDE 15; 6.25 MG/5ML; MG/5ML
5 SYRUP ORAL 4 TIMES DAILY PRN
Qty: 180 ML | Refills: 0 | Status: SHIPPED | OUTPATIENT
Start: 2024-09-30

## 2024-09-30 RX ORDER — AZITHROMYCIN 250 MG/1
TABLET, FILM COATED ORAL
Qty: 6 TABLET | Refills: 0 | Status: SHIPPED | OUTPATIENT
Start: 2024-09-30

## 2024-09-30 RX ORDER — METHYLPREDNISOLONE 4 MG
TABLET, DOSE PACK ORAL
Qty: 21 TABLET | Refills: 0 | Status: SHIPPED | OUTPATIENT
Start: 2024-09-30

## 2024-09-30 NOTE — ASSESSMENT & PLAN NOTE
Continue under the care of allergy specialist.  Continue nasal spray And Xyzal as directed.  Continue Singulair.

## 2024-09-30 NOTE — PROGRESS NOTES
"Subjective   Jaquan Mcaky is a 53 y.o. male.     Chief Complaint   Patient presents with    Hospital Follow Up Visit       History of Present Illness     Hospital Follow up-patient presented to the [facility]-a day on September 23 with complaints of chest pressure and shortness of breath after being exposed to a dog to which he is allergic.  He began to have itchy eyes, eye drainage, and had to use his rescue inhaler twice.  Shortness of breath did not improve so he came to the emergency room.  He was noted to be tachycardiac and tachypneic with decreased breath sounds.  EKG did not show any acute ST or T wave changes, O2 sat was 99 on room air.  Initial high-sensitivity troponin was noted to be at 7 and repeat troponin was less than 6.  Patient was treated with dexamethasone 12 mg IV, Rosangela DuoNeb, and lab work.  His D-dimer and PT/INR were negative for findings.  Patient was given a Combivent inhaler to use every 4 hours as needed for shortness of breath and wheezing.  Today he reports that he continues to have some chest tightness and cough.  He feels some wheezing.  Throat feels irritated.  He is taking Duoneb treatments but reports he did not know how to assemble his Combivent inhaler.  He will follow up with his asthma provider on \"the 7th\" for a breathing test.  He will also have a annual follow up for his allergy testing/injections.   Hypertension-ongoing.  Patient is currently taking lisinopril 30 mg daily as directed.  No negative side effects.  No negative side effects.  He denies any chest pain but does report a sensation of chest tightness.  He is not having palpitations.  Allergies-currently exacerbated due to his recent allergic response to dog exposure.  He continues to have some rhinorrhea and PND.  He reports that his throat is irritated due to the drainage.  He continues his immunotherapy.  He also has Flovent for use as well as his as needed albuterol/DuoNeb inhalers and nebulized medicine.  He also " "continues Singulair.      The following portions of the patient's history were reviewed and updated as appropriate: CC, ROS, allergies, current medications, past family history, past medical history, past social history, past surgical history and problem list.      Review of Systems   Constitutional:  Positive for fatigue. Negative for appetite change, unexpected weight gain and unexpected weight loss.   HENT:  Positive for sore throat. Negative for congestion, ear pain, postnasal drip, rhinorrhea, swollen glands, trouble swallowing and voice change.    Eyes:  Negative for blurred vision, double vision, pain and visual disturbance.   Respiratory:  Positive for cough, chest tightness, shortness of breath and wheezing.    Cardiovascular:  Negative for chest pain, palpitations and leg swelling.   Gastrointestinal:  Negative for abdominal pain, blood in stool, constipation, diarrhea, nausea and indigestion.   Genitourinary:  Negative for dysuria, hematuria and urgency.   Musculoskeletal:  Negative for arthralgias, back pain, gait problem, joint swelling and myalgias.   Skin:  Negative for color change and skin lesions.   Allergic/Immunologic: Negative.    Neurological:  Positive for dizziness. Negative for numbness and headache.   Hematological: Negative.    Psychiatric/Behavioral:  Negative for dysphoric mood, sleep disturbance and suicidal ideas. The patient is not nervous/anxious.    All other systems reviewed and are negative.      Objective     /72   Pulse 84   Resp 16   Ht 170.2 cm (67\")   Wt 107 kg (235 lb 6.4 oz)   SpO2 97%   BMI 36.87 kg/m²     Physical Exam  Vitals reviewed.   Constitutional:       General: He is not in acute distress.     Appearance: He is well-developed. He is obese. He is not diaphoretic.   HENT:      Head: Normocephalic and atraumatic.      Jaw: No tenderness.      Right Ear: Hearing, tympanic membrane, ear canal and external ear normal.      Left Ear: Hearing, tympanic " membrane, ear canal and external ear normal.      Nose: Nose normal. No nasal tenderness or congestion.      Right Sinus: No maxillary sinus tenderness or frontal sinus tenderness.      Left Sinus: No maxillary sinus tenderness or frontal sinus tenderness.      Mouth/Throat:      Lips: Pink.      Mouth: Mucous membranes are moist.      Pharynx: Oropharynx is clear. Uvula midline.   Eyes:      General: Lids are normal. No scleral icterus.     Extraocular Movements:      Right eye: Normal extraocular motion and no nystagmus.      Left eye: Normal extraocular motion and no nystagmus.      Conjunctiva/sclera: Conjunctivae normal.      Pupils: Pupils are equal, round, and reactive to light.   Neck:      Thyroid: No thyromegaly or thyroid tenderness.      Vascular: No carotid bruit or JVD.      Trachea: No tracheal tenderness.   Cardiovascular:      Rate and Rhythm: Normal rate and regular rhythm.      Pulses:           Dorsalis pedis pulses are 2+ on the right side and 2+ on the left side.        Posterior tibial pulses are 2+ on the right side and 2+ on the left side.      Heart sounds: Normal heart sounds, S1 normal and S2 normal. No murmur heard.  Pulmonary:      Effort: Pulmonary effort is normal. No accessory muscle usage, prolonged expiration or respiratory distress.      Breath sounds: Normal breath sounds.   Chest:      Chest wall: No tenderness.   Abdominal:      General: Bowel sounds are normal. There is no distension.      Palpations: Abdomen is soft. There is no hepatomegaly, splenomegaly or mass.      Tenderness: There is no abdominal tenderness.   Musculoskeletal:         General: Tenderness present.      Cervical back: Normal range of motion and neck supple.      Lumbar back: Tenderness present.      Right lower leg: No edema.      Left lower leg: No edema.      Right ankle: Tenderness present. Decreased range of motion.      Left ankle: Tenderness present. Decreased range of motion.      Right foot:  Normal capillary refill. Tenderness present.      Left foot: Normal capillary refill. Tenderness present.      Comments: No muscular atrophy or flaccidity.   Lymphadenopathy:      Head:      Right side of head: No submental or submandibular adenopathy.      Left side of head: No submental or submandibular adenopathy.      Cervical: No cervical adenopathy.      Right cervical: No superficial cervical adenopathy.     Left cervical: No superficial cervical adenopathy.   Skin:     General: Skin is warm and dry.      Capillary Refill: Capillary refill takes less than 2 seconds.      Coloration: Skin is not jaundiced or pale.      Findings: No erythema.      Nails: There is no clubbing.   Neurological:      Mental Status: He is alert and oriented to person, place, and time.      Cranial Nerves: No cranial nerve deficit or facial asymmetry.      Sensory: No sensory deficit.      Motor: No weakness, tremor, atrophy or abnormal muscle tone.      Coordination: Coordination normal.      Gait: Gait abnormal (moderate antalgia).      Deep Tendon Reflexes: Reflexes are normal and symmetric.   Psychiatric:         Attention and Perception: He is attentive.         Mood and Affect: Mood normal. Mood is not anxious or depressed.         Speech: Speech normal.         Behavior: Behavior normal. Behavior is cooperative.         Thought Content: Thought content normal.         Cognition and Memory: Cognition normal.         Judgment: Judgment normal.         Diagnoses and all orders for this visit:    1. Encounter for examination following treatment at hospital (Primary)    2. Mild persistent asthma with acute exacerbation  Assessment & Plan:  Continue under the care of asthma and allergy specialist.  Continue inhalers as directed.  Continue Astelin nasal spray, Benadryl as needed, Flonase nasal spray Claritin 10 mg, and montelukast 10 mg    Orders:  -     methylPREDNISolone (MEDROL) 4 MG dose pack; TAKE BY MOUTH AS DIRECTED ON  PACKAGE.  Dispense: 21 tablet; Refill: 0  -     azithromycin (Zithromax Z-Clark) 250 MG tablet; Take 2 tablets by mouth on day 1, then 1 tablet daily on days 2-5  Dispense: 6 tablet; Refill: 0  -     promethazine-dextromethorphan (PROMETHAZINE-DM) 6.25-15 MG/5ML syrup; Take 5 mL by mouth 4 (Four) Times a Day As Needed for Cough.  Dispense: 180 mL; Refill: 0    3. Seasonal allergic rhinitis due to pollen  Assessment & Plan:  Continue under the care of allergy specialist.  Continue nasal spray And Xyzal as directed.  Continue Singulair.      4. Essential hypertension  Assessment & Plan:  Continue lisinopril 30 mg.  Continue under the care of cardiology.  Ambulatory BP monitoring either at home or random community checks.  Patient to report continued elevations >140/90.  Patient may come by office for checks if needed.            Understands disease processes and need for medications.  Understands reasons for urgent and emergent care.  Patient (& family) verbalized agreement for treatment plan.   Emotional support and active listening provided.  Patient provided time to verbalize feelings.    Current outpatient and discharge medications have been reconciled for the patient.  Reviewed by: BALDOMERO Thao  Hospital records reviewed.  Discussed any specific concerns patient had regarding testing, labs, Etc.     RTC PRN 3-5 days for worsening or non resolving symptoms    Keep scheduled follow up.             This document has been electronically signed by:  BALDOMERO Thao, FNP-C    Dragon disclaimer:  Part of this note may be an electronic transcription/translation of spoken language to printed text using the Dragon Dictation System.

## 2024-10-01 ENCOUNTER — TELEPHONE (OUTPATIENT)
Dept: CARDIOLOGY | Facility: CLINIC | Age: 54
End: 2024-10-01
Payer: COMMERCIAL

## 2024-10-01 NOTE — TELEPHONE ENCOUNTER
Hub to relay.     Called pt to advise him that  is wanting him to come in and see him to discuss the CT. He is unable to order due to multiple allergies and it would make it difficult to perform a good study. Made pt an apt for 10/7/24 at 1:30 pm. No answer LM.

## 2024-10-07 ENCOUNTER — OFFICE VISIT (OUTPATIENT)
Dept: CARDIOLOGY | Facility: CLINIC | Age: 54
End: 2024-10-07
Payer: COMMERCIAL

## 2024-10-07 VITALS
SYSTOLIC BLOOD PRESSURE: 142 MMHG | HEIGHT: 67 IN | WEIGHT: 239.8 LBS | DIASTOLIC BLOOD PRESSURE: 76 MMHG | RESPIRATION RATE: 18 BRPM | BODY MASS INDEX: 37.64 KG/M2 | OXYGEN SATURATION: 99 % | HEART RATE: 97 BPM

## 2024-10-07 DIAGNOSIS — R07.2 PRECORDIAL PAIN: Primary | ICD-10-CM

## 2024-10-07 DIAGNOSIS — Z72.0 TOBACCO ABUSE: ICD-10-CM

## 2024-10-07 DIAGNOSIS — I10 ESSENTIAL HYPERTENSION: ICD-10-CM

## 2024-10-07 PROCEDURE — 99214 OFFICE O/P EST MOD 30 MIN: CPT | Performed by: INTERNAL MEDICINE

## 2024-10-07 PROCEDURE — 3077F SYST BP >= 140 MM HG: CPT | Performed by: INTERNAL MEDICINE

## 2024-10-07 PROCEDURE — 93000 ELECTROCARDIOGRAM COMPLETE: CPT | Performed by: INTERNAL MEDICINE

## 2024-10-07 PROCEDURE — 3078F DIAST BP <80 MM HG: CPT | Performed by: INTERNAL MEDICINE

## 2024-10-07 NOTE — LETTER
October 8, 2024     BALDOMERO Thao  [facility] [address]    Patient: Jaquan Mckay   YOB: 1970   Date of Visit: 10/7/2024     Dear BALDOMERO Thao:       Thank you for referring Jaquan Mckay to me for evaluation. Below are the relevant portions of my assessment and plan of care.    If you have questions, please do not hesitate to call me. I look forward to following Jaquan along with you.         Sincerely,        Leandro Daily MD        CC: No Recipients    Leandro Daily MD  10/08/24 1538  Sign when Signing Visit  Tiera Valenzuela APRN  Jaquan Mckay  1970  10/07/2024    Patient Active Problem List   Diagnosis   • Gastroesophageal reflux disease without esophagitis   • Essential hypertension   • Seizures   • Hyperlipidemia   • Anxiety   • Varicocele present on ultrasound of scrotum   • Chronic bilateral low back pain with left-sided sciatica   • Seasonal allergic rhinitis due to pollen   • Mild persistent asthma without complication   • Precordial pain   • Burning with urination   • Congenital coronary artery anomaly   • BMI 35.0-35.9,adult   • Chondromalacia, knee   • Achilles tendinitis of both lower extremities   • Muscle spasm of both lower legs   • Personal history of allergy to shellfish   • Sensation of cold in lower extremity   • Varicose vein of leg   • Heart palpitations   • Generalized anxiety disorder   • Chronic elbow pain, right   • Unstable angina   • ASCVD (arteriosclerotic cardiovascular disease)   • Other constipation   • Class 2 severe obesity due to excess calories with serious comorbidity and body mass index (BMI) of 36.0 to 36.9 in adult   • Bacteremia   • Therapeutic opioid-induced constipation (OIC)   • Rectal bleeding   • Bloating   • History of colon polyps   • Lateral epicondylitis of right elbow   • Psychophysiological insomnia   • Chronic rhinitis   • Vitamin D deficiency   • Renal calculus   • Chronic idiopathic constipation   •  Diarrhea   • Bilateral flank pain   • BPH without obstruction/lower urinary tract symptoms   • Incomplete bladder emptying   • Frequency of urination   • Mixed stress and urge urinary incontinence       Dear Tiera:    Subjective    Jaquan Mckay is a 53 y.o. male with the problems as listed above, presents    Chief complaint: Recurrent chronic chest pains    History of present illness: Jaquan Mckay is a 53-year-old  male with chronic intermittent chest pains for the last few years.  His previous cardiac elevation with cardiac catheterization and 2018 revealed minimal coronary artery disease.  His recent nuclear stress test in June 2024 also revealed to be normal.  He was in the emergency department recently in April 2024 with chest pains and his high-sensitivity troponin was normal and flat at 7 indicating of low probability for having any underlying significant coronary artery disease.  He presents again today with recurrent chest pains.  Incidentally he reports to have had major allergic reaction to the radiocontrast which  caused him to have rash and shortness of breath last time he had radiocontrast.  He continues to have intermittent chest pains which are mostly sharp which seem to occur at random and resolve spontaneously.  He states they have not been any worse recently.  He has long history of smoking but he says he has cut it down to 1 pack a week now.     Latest Reference Range & Units 04/15/24 17:01 04/15/24 20:15 09/23/24 19:12 09/23/24 21:16   HS Troponin T <22 ng/L 7 7 7 <6     Cardiac Catheterization/Vascular Study      Accession Number: [id number]   Date of Study: 9/28/18   Ordering Provider: Leandro Daily MD   Clinical Indications: Unstable angina [I20.0 (ICD-10-CM)]        Conclusion and comments: Mr. Mckay is a 47-year-old  male with persistent chest pains that had some features of unstable angina.  His cardiac catheterization revealed:     Left main coronary artery giving  "rise to only left anterior descending coronary artery from the left coronary cusp and appears normal.  Left anterior descending coronary artery had minimal plaquing disease in the mid and distal segments.  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  Right coronary artery is dominant for posterior circulation appears angiographically normal.  Left ventricular chamber size, wall motion and systolic function are normal with an estimated LV ejection fraction of about 60-60%.     Recommendations: In view of mild degree of atherosclerotic disease, it appears that his chest pains are noncardiac in etiology at this time.  For now would continue to emphasize on risk factor modification as needed and looking into noncardiac causes for his symptoms should they persist.     Leandro Daily M.D. [facility]    Allergies   Allergen Reactions   • Ciprofloxacin Anaphylaxis and Hives   • Miralax [Polyethylene Glycol] Itching and Rash   • Mobic [Meloxicam] Other (See Comments)     Pt states, \"It make my feet and hands go numb and I can't hardly walk.\"    • Paxil [Paroxetine Hcl] Shortness Of Breath     Chest pain    • Peanut-Containing Drug Products Anaphylaxis   • Penicillins Anaphylaxis   • Pristiq [Desvenlafaxine Succinate Er] Dizziness   • Sulfa Antibiotics Anaphylaxis, Itching and Rash   • Doxycycline Hives   • Fish-Derived Products Hives   • Isosorbide Nitrate Rash     Rash, hives, had to use inhaler.    • Lyrica [Pregabalin] Hives   • Movantik [Naloxegol] Rash   • Seroquel [Quetiapine] Hives and Rash   • Trulance [Plecanatide] Hives   • Buspar [Buspirone] Rash   • Clarithromycin Rash   • Clindamycin/Lincomycin Rash   • Codeine Rash   • Contrast Dye (Echo Or Unknown Ct/Mr) Itching and Rash   • Diltiazem Rash   • Flomax [Tamsulosin] Hives   • Gabapentin Rash   • Iodinated Contrast Media Itching and Rash   • Keflex [Cephalexin] Rash   • Levocetirizine Rash "   • Linzess [Linaclotide] Rash   • Metoprolol Rash   • Prednisone Rash and Other (See Comments)     Face, feet, and legs go completely numb per patient   • Robitussin Cough+ Chest Max St [Dextromethorphan-Guaifenesin] Itching   • Shrimp (Diagnostic) Rash   • Spironolactone Rash   • Viibryd [Vilazodone Hcl] Itching and Rash   • Zoloft [Sertraline Hcl] Hives and Itching   :    Current Outpatient Medications:   •  acetaminophen (TYLENOL) 500 MG tablet, Take 1 tablet by mouth Every 6 (Six) Hours As Needed for Mild Pain., Disp: 30 tablet, Rfl: 2  •  albuterol sulfate  (90 Base) MCG/ACT inhaler, , Disp: , Rfl:   •  albuterol sulfate  (90 Base) MCG/ACT inhaler, Inhale 2 puffs by mouth every 4 hours as needed., Disp: 18 g, Rfl: 3  •  Alcohol Swabs (Alcohol Wipes) 70 % pads, 1 each 2 (Two) Times a Day., Disp: 100 each, Rfl: 5  •  aspirin (Aspirin EC Adult Low Dose) 81 MG EC tablet, Take 1 tablet by mouth Daily., Disp: 30 tablet, Rfl: 3  •  aspirin 81 MG EC tablet, Take 1 tablet by mouth Daily., Disp: 90 tablet, Rfl: 2  •  azelastine (ASTELIN) 0.1 % nasal spray, , Disp: , Rfl:   •  azithromycin (Zithromax Z-Clark) 250 MG tablet, Take 2 tablets by mouth on day 1, then 1 tablet daily on days 2-5, Disp: 6 tablet, Rfl: 0  •  calcium polycarbophil (FiberCon) 625 MG tablet, Take 1 tablet by mouth 2 (Two) Times a Day., Disp: 60 tablet, Rfl: 3  •  clobetasol (TEMOVATE) 0.05 % ointment, Apply 1 application topically to the appropriate area as directed 2 (Two) Times a Day., Disp: 60 g, Rfl: 2  •  colestipol (Colestid) 5 g packet, Take 1 packet by mouth Daily., Disp: 30 each, Rfl: 0  •  dexlansoprazole (Dexilant) 60 MG capsule, Take 1 capsule by mouth 2 (Two) Times a Day., Disp: 180 capsule, Rfl: 5  •  dexlansoprazole (Dexilant) 60 MG capsule, Take 1 capsule by mouth 2 (Two) Times a Day., Disp: 60 capsule, Rfl: 13  •  dicyclomine (BENTYL) 10 MG capsule, Take 1 capsule by mouth 4 (Four) Times a Day Before Meals & at Bedtime.,  Disp: 30 capsule, Rfl: 0  •  Dilantin 100 MG capsule, Take 2 capsules by mouth 2 (Two) Times a Day., Disp: 120 capsule, Rfl: 2  •  diphenhydrAMINE (Benadryl Allergy) 25 MG tablet, Take 1-2 tablets by mouth Every 8 (Eight) Hours As Needed for Itching (or rash)., Disp: 120 tablet, Rfl: 2  •  Elastic Bandages & Supports (ACE Ankle Brace) misc, 1 each Daily., Disp: 1 each, Rfl: 0  •  ergocalciferol (ERGOCALCIFEROL) 1.25 MG (21884 UT) capsule, Take 1 capsule by mouth 1 (One) Time Per Week., Disp: 4 capsule, Rfl: 3  •  fluticasone (Flovent HFA) 110 MCG/ACT inhaler, Inhale 1 puff by mouth 2 (Two) Times a Day., Disp: 12 g, Rfl: 5  •  fluticasone (FLOVENT HFA) 44 MCG/ACT inhaler, Inhale 1 puff by mouth twice a day., Disp: 10.6 g, Rfl: 5  •  glucose blood (OneTouch Ultra) test strip, Use as instructed 2 times daily and as needed, Disp: 100 each, Rfl: 3  •  guaiFENesin (Mucinex) 600 MG 12 hr tablet, Take 2 tablets by mouth 2 (Two) Times a Day., Disp: 20 tablet, Rfl: 0  •  HYDROcodone-acetaminophen (Norco) 5-325 MG per tablet, Take 1-1.5 tablets by mouth Every 8 (Eight) Hours As Needed for Moderate Pain., Disp: 12 tablet, Rfl: 0  •  ipratropium-albuterol (COMBIVENT RESPIMAT)  MCG/ACT inhaler, Inhale 1 puff by mouth 4 (Four) Times a Day As Needed for Wheezing., Disp: 4 g, Rfl: 0  •  ipratropium-albuterol (DUO-NEB) 0.5-2.5 mg/3 ml nebulizer, Inhale the contents of 3 mL vial nebulizer every 6 hours as needed., Disp: 360 mL, Rfl: 3  •  Lancets (OneTouch Delica Plus [id number]) misc, Use as instructed 2 times daily and as needed, Disp: 100 each, Rfl: 3  •  lisinopril (PRINIVIL,ZESTRIL) 30 MG tablet, Take 1 tablet by mouth Daily. FOR BLOOD PRESSURE, Disp: 90 tablet, Rfl: 2  •  lisinopril (PRINIVIL,ZESTRIL) 30 MG tablet, Take 1 tablet by mouth Daily for blood pressure., Disp: 90 tablet, Rfl: 1  •  loratadine (CLARITIN) 10 MG tablet, Take 1 tablet by mouth Daily As Needed for Allergies., Disp: 90 tablet, Rfl: 2  •   methylPREDNISolone (MEDROL) 4 MG dose pack, TAKE BY MOUTH AS DIRECTED ON PACKAGE., Disp: 21 tablet, Rfl: 0  •  miconazole (Micatin) 2 % cream, Apply topically to the appropriate area as directed 2 (Two) Times a Day To feet., Disp: 30 g, Rfl: 0  •  mirtazapine (REMERON) 30 MG tablet, Take 1.5 tablets by mouth Every Night., Disp: 45 tablet, Rfl: 5  •  montelukast (SINGULAIR) 10 MG tablet, Take 1 tablet by mouth., Disp: , Rfl:   •  multivitamin with minerals tablet tablet, Take 1 tablet by mouth Daily., Disp: 30 tablet, Rfl: 12  •  ondansetron (Zofran) 4 MG tablet, Take 1 tablet by mouth Every 8 (Eight) Hours As Needed for Nausea., Disp: 20 tablet, Rfl: 0  •  promethazine-dextromethorphan (PROMETHAZINE-DM) 6.25-15 MG/5ML syrup, Take 5 mL by mouth 4 (Four) Times a Day As Needed for Cough., Disp: 180 mL, Rfl: 0  •  rosuvastatin (CRESTOR) 40 MG tablet, Take 1 tablet by mouth Daily., Disp: 90 tablet, Rfl: 2  •  rosuvastatin (Crestor) 40 MG tablet, Take 1 tablet by mouth Daily., Disp: 30 tablet, Rfl: 3  •  senna (Senokot) 8.6 MG tablet, Take 2 tablets by mouth 2 (Two) Times a Day., Disp: 120 tablet, Rfl: 0  •  tamsulosin (FLOMAX) 0.4 MG capsule 24 hr capsule, Take 1 capsule by mouth Daily., Disp: 90 capsule, Rfl: 3  •  tamsulosin (Flomax) 0.4 MG capsule 24 hr capsule, Take 1 capsule by mouth Daily., Disp: 30 capsule, Rfl: 3  •  temazepam (Restoril) 15 MG capsule, Take 1 capsule by mouth At Night As Needed for Sleep., Disp: 30 capsule, Rfl: 0  •  TiZANidine (Zanaflex) 4 MG capsule, Take 1 capsule by mouth 3 (Three) Times a Day., Disp: 30 capsule, Rfl: 5  •  vitamin D (ERGOCALCIFEROL) 1.25 MG (29048 UT) capsule capsule, Take 1 capsule by mouth Every 7 (Seven) Days., Disp: 12 capsule, Rfl: 2    The following portions of the patient's history were reviewed and updated as appropriate: allergies, current medications, past family history, past medical history, past social history, past surgical history and problem list.    Social  "History     Tobacco Use   • Smoking status: Every Day     Current packs/day: 1.00     Average packs/day: 1 pack/day for 17.6 years (17.6 ttl pk-yrs)     Types: Cigars, Cigarettes     Start date: 4/11/2022     Passive exposure: Current   • Smokeless tobacco: Never   Vaping Use   • Vaping status: Never Used   Substance Use Topics   • Alcohol use: No   • Drug use: No     Review of Systems   Constitutional: Negative.   HENT: Negative.     Eyes: Negative.    Cardiovascular:  Positive for chest pain, dyspnea on exertion and leg swelling. Negative for cyanosis, irregular heartbeat, palpitations and syncope.   Respiratory:  Positive for cough, shortness of breath and snoring.    Endocrine: Negative.    Hematologic/Lymphatic: Bruises/bleeds easily.   Skin: Negative.    Musculoskeletal:  Positive for arthritis, back pain, joint pain and joint swelling.   Gastrointestinal:  Positive for abdominal pain and diarrhea.   Genitourinary: Negative.    Neurological:  Positive for dizziness, light-headedness and seizures. Negative for headaches, tremors, vertigo and weakness.   Psychiatric/Behavioral: Negative.     Allergic/Immunologic: Negative.      Objective  Vitals:    10/07/24 1302   BP: 142/76   BP Location: Left arm   Patient Position: Sitting   Cuff Size: Adult   Pulse: 97   Resp: 18   SpO2: 99%   Weight: 109 kg (239 lb 12.8 oz)   Height: 170.2 cm (67\")     Body mass index is 37.56 kg/m².    Vitals reviewed.   Constitutional:       Appearance: Well-developed.   Eyes:      Conjunctiva/sclera: Conjunctivae normal.   HENT:      Head: Normocephalic.   Neck:      Thyroid: No thyromegaly.      Vascular: No JVD.      Trachea: No tracheal deviation.   Pulmonary:      Effort: No respiratory distress.      Breath sounds: Normal breath sounds. No wheezing. No rales.   Cardiovascular:      PMI at left midclavicular line. Normal rate. Regular rhythm. Normal S1. Normal S2.       Murmurs: There is no murmur.      No gallop.  No click. No rub. "   Pulses:     Intact distal pulses.   Edema:     Peripheral edema absent.   Abdominal:      General: Bowel sounds are normal.      Palpations: Abdomen is soft. There is no abdominal mass.      Tenderness: There is no abdominal tenderness.   Musculoskeletal:      Cervical back: Normal range of motion and neck supple. Skin:     General: Skin is warm and dry.   Neurological:      Mental Status: Alert and oriented to person, place, and time.      Cranial Nerves: No cranial nerve deficit.       Lab Results   Component Value Date     09/23/2024    K 3.6 09/23/2024     09/23/2024    CO2 20.8 (L) 09/23/2024    BUN 14 09/23/2024    CREATININE 1.10 09/23/2024    GLUCOSE 107 (H) 09/23/2024    CALCIUM 9.2 09/23/2024    AST 27 09/23/2024    ALT 24 09/23/2024    ALKPHOS 99 09/23/2024    LABIL2 1.1 (L) 05/29/2016     Lab Results   Component Value Date    CKTOTAL 153 06/20/2019     Lab Results   Component Value Date    WBC 7.74 09/23/2024    HGB 16.0 09/23/2024    HCT 46.3 09/23/2024     09/23/2024     Lab Results   Component Value Date    INR 1.00 09/23/2024    INR 1.01 12/19/2023    INR 0.96 06/04/2023     Lab Results   Component Value Date    MG 2.1 06/20/2024     Lab Results   Component Value Date    TSH 2.670 03/20/2024    PSA 0.6 11/17/2023    CHLPL 232 (H) 04/05/2016    TRIG 74 03/20/2024    HDL 65 (H) 03/20/2024     (H) 03/20/2024      Lab Results   Component Value Date    BNP 3.0 09/26/2018       ECG 12 Lead    Date/Time: 10/7/2024 1:10 PM  Performed by: Leandro Daily MD    Authorized by: Leandro Daily MD  Comparison: compared with previous ECG from 9/24/2024  Similar to previous ECG  Rhythm: sinus rhythm  ST Segments: ST segments normal  T Waves: T waves normal            Assessment & Plan   Diagnosis Plan   1. Precordial pain, sounds atypical for angina with recent normal Lexiscan sestamibi study and normal high sensitive troponin x 4 with ongoing chest pains indicative of possibly  noncardiac etiology.         2. Severe allergy to radiocontrast         3 Essential hypertension        3. Tobacco abuse          Recommendations  Continue with aspirin as tolerated.  In view of chronicity and atypical nature of his chest pains with recent normal nuclear stress test and normal high sensitive troponin x 4 with chest pains and with his history of allergy to radiocontrast dye which reportedly caused him to have rash and shortness of breath and with allergy to metoprolol that is required to be given prior to CT coronary angiogram I think the risk of doing a CT coronary angiogram or cardiac catheterization is much higher than the risk of not doing it at this time.  Hence we will leave him alone.  Will consider this in the future if he has any significant EKG changes associated with chest pain or elevated troponin at any point in the future.  I have discussed this with Mr. Mckay and expressed understanding and is agreeable with this plan.  I have asked the patient to go to the emergency room if he has increasing chest pains.  He expressed understanding    Return in about 3 months (around 1/7/2025) for or sooner if needed.    As always, Tiera  I appreciate very much the opportunity to participate in the cardiovascular care of your patients. Please do not hesitate to call me with any questions with regards to Jaquan Mckay's evaluation and management.       With Best Regards,        Leandro Daily MD, [facility]    Please note that portions of this note were completed with a voice recognition program.

## 2024-10-07 NOTE — PROGRESS NOTES
Tiera Valenzuela APRN  Jaquan Mckay  1970  10/07/2024    Patient Active Problem List   Diagnosis    Gastroesophageal reflux disease without esophagitis    Essential hypertension    Seizures    Hyperlipidemia    Anxiety    Varicocele present on ultrasound of scrotum    Chronic bilateral low back pain with left-sided sciatica    Seasonal allergic rhinitis due to pollen    Mild persistent asthma without complication    Precordial pain    Burning with urination    Congenital coronary artery anomaly    BMI 35.0-35.9,adult    Chondromalacia, knee    Achilles tendinitis of both lower extremities    Muscle spasm of both lower legs    Personal history of allergy to shellfish    Sensation of cold in lower extremity    Varicose vein of leg    Heart palpitations    Generalized anxiety disorder    Chronic elbow pain, right    Unstable angina    ASCVD (arteriosclerotic cardiovascular disease)    Other constipation    Class 2 severe obesity due to excess calories with serious comorbidity and body mass index (BMI) of 36.0 to 36.9 in adult    Bacteremia    Therapeutic opioid-induced constipation (OIC)    Rectal bleeding    Bloating    History of colon polyps    Lateral epicondylitis of right elbow    Psychophysiological insomnia    Chronic rhinitis    Vitamin D deficiency    Renal calculus    Chronic idiopathic constipation    Diarrhea    Bilateral flank pain    BPH without obstruction/lower urinary tract symptoms    Incomplete bladder emptying    Frequency of urination    Mixed stress and urge urinary incontinence       Dear Tiera:    Subjective     Jaquan Mckay is a 53 y.o. male with the problems as listed above, presents    Chief complaint: Recurrent chronic chest pains    History of present illness: Jaquan Mckay is a 53-year-old  male with chronic intermittent chest pains for the last few years.  His previous cardiac elevation with cardiac catheterization and 2018 revealed minimal coronary artery disease.  His  recent nuclear stress test in June 2024 also revealed to be normal.  He was in the emergency department recently in April 2024 with chest pains and his high-sensitivity troponin was normal and flat at 7 indicating of low probability for having any underlying significant coronary artery disease.  He presents again today with recurrent chest pains.  Incidentally he reports to have had major allergic reaction to the radiocontrast which  caused him to have rash and shortness of breath last time he had radiocontrast.  He continues to have intermittent chest pains which are mostly sharp which seem to occur at random and resolve spontaneously.  He states they have not been any worse recently.  He has long history of smoking but he says he has cut it down to 1 pack a week now.     Latest Reference Range & Units 04/15/24 17:01 04/15/24 20:15 09/23/24 19:12 09/23/24 21:16   HS Troponin T <22 ng/L 7 7 7 <6     Cardiac Catheterization/Vascular Study      Accession Number: [id number]   Date of Study: 9/28/18   Ordering Provider: Leandro Daily MD   Clinical Indications: Unstable angina [I20.0 (ICD-10-CM)]        Conclusion and comments: Mr. Mckay is a 47-year-old  male with persistent chest pains that had some features of unstable angina.  His cardiac catheterization revealed:     Left main coronary artery giving rise to only left anterior descending coronary artery from the left coronary cusp and appears normal.  Left anterior descending coronary artery had minimal plaquing disease in the mid and distal segments.  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  Right coronary artery is dominant for posterior circulation appears angiographically normal.  Left ventricular chamber size, wall motion and systolic function are normal with an estimated LV ejection fraction of about 60-60%.     Recommendations: In view of mild degree of  "atherosclerotic disease, it appears that his chest pains are noncardiac in etiology at this time.  For now would continue to emphasize on risk factor modification as needed and looking into noncardiac causes for his symptoms should they persist.     Leandro Daily M.D. [facility]    Allergies   Allergen Reactions    Ciprofloxacin Anaphylaxis and Hives    Miralax [Polyethylene Glycol] Itching and Rash    Mobic [Meloxicam] Other (See Comments)     Pt states, \"It make my feet and hands go numb and I can't hardly walk.\"     Paxil [Paroxetine Hcl] Shortness Of Breath     Chest pain     Peanut-Containing Drug Products Anaphylaxis    Penicillins Anaphylaxis    Pristiq [Desvenlafaxine Succinate Er] Dizziness    Sulfa Antibiotics Anaphylaxis, Itching and Rash    Doxycycline Hives    Fish-Derived Products Hives    Isosorbide Nitrate Rash     Rash, hives, had to use inhaler.     Lyrica [Pregabalin] Hives    Movantik [Naloxegol] Rash    Seroquel [Quetiapine] Hives and Rash    Trulance [Plecanatide] Hives    Buspar [Buspirone] Rash    Clarithromycin Rash    Clindamycin/Lincomycin Rash    Codeine Rash    Contrast Dye (Echo Or Unknown Ct/Mr) Itching and Rash    Diltiazem Rash    Flomax [Tamsulosin] Hives    Gabapentin Rash    Iodinated Contrast Media Itching and Rash    Keflex [Cephalexin] Rash    Levocetirizine Rash    Linzess [Linaclotide] Rash    Metoprolol Rash    Prednisone Rash and Other (See Comments)     Face, feet, and legs go completely numb per patient    Robitussin Cough+ Chest Max St [Dextromethorphan-Guaifenesin] Itching    Shrimp (Diagnostic) Rash    Spironolactone Rash    Viibryd [Vilazodone Hcl] Itching and Rash    Zoloft [Sertraline Hcl] Hives and Itching   :    Current Outpatient Medications:     acetaminophen (TYLENOL) 500 MG tablet, Take 1 tablet by mouth Every 6 (Six) Hours As Needed for Mild Pain., Disp: 30 tablet, Rfl: 2    albuterol sulfate  (90 Base) MCG/ACT inhaler, , Disp: , Rfl:     albuterol sulfate "  (90 Base) MCG/ACT inhaler, Inhale 2 puffs by mouth every 4 hours as needed., Disp: 18 g, Rfl: 3    Alcohol Swabs (Alcohol Wipes) 70 % pads, 1 each 2 (Two) Times a Day., Disp: 100 each, Rfl: 5    aspirin (Aspirin EC Adult Low Dose) 81 MG EC tablet, Take 1 tablet by mouth Daily., Disp: 30 tablet, Rfl: 3    aspirin 81 MG EC tablet, Take 1 tablet by mouth Daily., Disp: 90 tablet, Rfl: 2    azelastine (ASTELIN) 0.1 % nasal spray, , Disp: , Rfl:     azithromycin (Zithromax Z-Clark) 250 MG tablet, Take 2 tablets by mouth on day 1, then 1 tablet daily on days 2-5, Disp: 6 tablet, Rfl: 0    calcium polycarbophil (FiberCon) 625 MG tablet, Take 1 tablet by mouth 2 (Two) Times a Day., Disp: 60 tablet, Rfl: 3    clobetasol (TEMOVATE) 0.05 % ointment, Apply 1 application topically to the appropriate area as directed 2 (Two) Times a Day., Disp: 60 g, Rfl: 2    colestipol (Colestid) 5 g packet, Take 1 packet by mouth Daily., Disp: 30 each, Rfl: 0    dexlansoprazole (Dexilant) 60 MG capsule, Take 1 capsule by mouth 2 (Two) Times a Day., Disp: 180 capsule, Rfl: 5    dexlansoprazole (Dexilant) 60 MG capsule, Take 1 capsule by mouth 2 (Two) Times a Day., Disp: 60 capsule, Rfl: 13    dicyclomine (BENTYL) 10 MG capsule, Take 1 capsule by mouth 4 (Four) Times a Day Before Meals & at Bedtime., Disp: 30 capsule, Rfl: 0    Dilantin 100 MG capsule, Take 2 capsules by mouth 2 (Two) Times a Day., Disp: 120 capsule, Rfl: 2    diphenhydrAMINE (Benadryl Allergy) 25 MG tablet, Take 1-2 tablets by mouth Every 8 (Eight) Hours As Needed for Itching (or rash)., Disp: 120 tablet, Rfl: 2    Elastic Bandages & Supports (ACE Ankle Brace) misc, 1 each Daily., Disp: 1 each, Rfl: 0    ergocalciferol (ERGOCALCIFEROL) 1.25 MG (57759 UT) capsule, Take 1 capsule by mouth 1 (One) Time Per Week., Disp: 4 capsule, Rfl: 3    fluticasone (Flovent HFA) 110 MCG/ACT inhaler, Inhale 1 puff by mouth 2 (Two) Times a Day., Disp: 12 g, Rfl: 5    fluticasone (FLOVENT  HFA) 44 MCG/ACT inhaler, Inhale 1 puff by mouth twice a day., Disp: 10.6 g, Rfl: 5    glucose blood (OneTouch Ultra) test strip, Use as instructed 2 times daily and as needed, Disp: 100 each, Rfl: 3    guaiFENesin (Mucinex) 600 MG 12 hr tablet, Take 2 tablets by mouth 2 (Two) Times a Day., Disp: 20 tablet, Rfl: 0    HYDROcodone-acetaminophen (Norco) 5-325 MG per tablet, Take 1-1.5 tablets by mouth Every 8 (Eight) Hours As Needed for Moderate Pain., Disp: 12 tablet, Rfl: 0    ipratropium-albuterol (COMBIVENT RESPIMAT)  MCG/ACT inhaler, Inhale 1 puff by mouth 4 (Four) Times a Day As Needed for Wheezing., Disp: 4 g, Rfl: 0    ipratropium-albuterol (DUO-NEB) 0.5-2.5 mg/3 ml nebulizer, Inhale the contents of 3 mL vial nebulizer every 6 hours as needed., Disp: 360 mL, Rfl: 3    Lancets (OneTouch Delica Plus [id number]) misc, Use as instructed 2 times daily and as needed, Disp: 100 each, Rfl: 3    lisinopril (PRINIVIL,ZESTRIL) 30 MG tablet, Take 1 tablet by mouth Daily. FOR BLOOD PRESSURE, Disp: 90 tablet, Rfl: 2    lisinopril (PRINIVIL,ZESTRIL) 30 MG tablet, Take 1 tablet by mouth Daily for blood pressure., Disp: 90 tablet, Rfl: 1    loratadine (CLARITIN) 10 MG tablet, Take 1 tablet by mouth Daily As Needed for Allergies., Disp: 90 tablet, Rfl: 2    methylPREDNISolone (MEDROL) 4 MG dose pack, TAKE BY MOUTH AS DIRECTED ON PACKAGE., Disp: 21 tablet, Rfl: 0    miconazole (Micatin) 2 % cream, Apply topically to the appropriate area as directed 2 (Two) Times a Day To feet., Disp: 30 g, Rfl: 0    mirtazapine (REMERON) 30 MG tablet, Take 1.5 tablets by mouth Every Night., Disp: 45 tablet, Rfl: 5    montelukast (SINGULAIR) 10 MG tablet, Take 1 tablet by mouth., Disp: , Rfl:     multivitamin with minerals tablet tablet, Take 1 tablet by mouth Daily., Disp: 30 tablet, Rfl: 12    ondansetron (Zofran) 4 MG tablet, Take 1 tablet by mouth Every 8 (Eight) Hours As Needed for Nausea., Disp: 20 tablet, Rfl: 0     promethazine-dextromethorphan (PROMETHAZINE-DM) 6.25-15 MG/5ML syrup, Take 5 mL by mouth 4 (Four) Times a Day As Needed for Cough., Disp: 180 mL, Rfl: 0    rosuvastatin (CRESTOR) 40 MG tablet, Take 1 tablet by mouth Daily., Disp: 90 tablet, Rfl: 2    rosuvastatin (Crestor) 40 MG tablet, Take 1 tablet by mouth Daily., Disp: 30 tablet, Rfl: 3    senna (Senokot) 8.6 MG tablet, Take 2 tablets by mouth 2 (Two) Times a Day., Disp: 120 tablet, Rfl: 0    tamsulosin (FLOMAX) 0.4 MG capsule 24 hr capsule, Take 1 capsule by mouth Daily., Disp: 90 capsule, Rfl: 3    tamsulosin (Flomax) 0.4 MG capsule 24 hr capsule, Take 1 capsule by mouth Daily., Disp: 30 capsule, Rfl: 3    temazepam (Restoril) 15 MG capsule, Take 1 capsule by mouth At Night As Needed for Sleep., Disp: 30 capsule, Rfl: 0    TiZANidine (Zanaflex) 4 MG capsule, Take 1 capsule by mouth 3 (Three) Times a Day., Disp: 30 capsule, Rfl: 5    vitamin D (ERGOCALCIFEROL) 1.25 MG (65813 UT) capsule capsule, Take 1 capsule by mouth Every 7 (Seven) Days., Disp: 12 capsule, Rfl: 2    The following portions of the patient's history were reviewed and updated as appropriate: allergies, current medications, past family history, past medical history, past social history, past surgical history and problem list.    Social History     Tobacco Use    Smoking status: Every Day     Current packs/day: 1.00     Average packs/day: 1 pack/day for 17.6 years (17.6 ttl pk-yrs)     Types: Cigars, Cigarettes     Start date: 4/11/2022     Passive exposure: Current    Smokeless tobacco: Never   Vaping Use    Vaping status: Never Used   Substance Use Topics    Alcohol use: No    Drug use: No     Review of Systems   Constitutional: Negative.   HENT: Negative.     Eyes: Negative.    Cardiovascular:  Positive for chest pain, dyspnea on exertion and leg swelling. Negative for cyanosis, irregular heartbeat, palpitations and syncope.   Respiratory:  Positive for cough, shortness of breath and snoring.   "  Endocrine: Negative.    Hematologic/Lymphatic: Bruises/bleeds easily.   Skin: Negative.    Musculoskeletal:  Positive for arthritis, back pain, joint pain and joint swelling.   Gastrointestinal:  Positive for abdominal pain and diarrhea.   Genitourinary: Negative.    Neurological:  Positive for dizziness, light-headedness and seizures. Negative for headaches, tremors, vertigo and weakness.   Psychiatric/Behavioral: Negative.     Allergic/Immunologic: Negative.      Objective   Vitals:    10/07/24 1302   BP: 142/76   BP Location: Left arm   Patient Position: Sitting   Cuff Size: Adult   Pulse: 97   Resp: 18   SpO2: 99%   Weight: 109 kg (239 lb 12.8 oz)   Height: 170.2 cm (67\")     Body mass index is 37.56 kg/m².    Vitals reviewed.   Constitutional:       Appearance: Well-developed.   Eyes:      Conjunctiva/sclera: Conjunctivae normal.   HENT:      Head: Normocephalic.   Neck:      Thyroid: No thyromegaly.      Vascular: No JVD.      Trachea: No tracheal deviation.   Pulmonary:      Effort: No respiratory distress.      Breath sounds: Normal breath sounds. No wheezing. No rales.   Cardiovascular:      PMI at left midclavicular line. Normal rate. Regular rhythm. Normal S1. Normal S2.       Murmurs: There is no murmur.      No gallop.  No click. No rub.   Pulses:     Intact distal pulses.   Edema:     Peripheral edema absent.   Abdominal:      General: Bowel sounds are normal.      Palpations: Abdomen is soft. There is no abdominal mass.      Tenderness: There is no abdominal tenderness.   Musculoskeletal:      Cervical back: Normal range of motion and neck supple. Skin:     General: Skin is warm and dry.   Neurological:      Mental Status: Alert and oriented to person, place, and time.      Cranial Nerves: No cranial nerve deficit.       Lab Results   Component Value Date     09/23/2024    K 3.6 09/23/2024     09/23/2024    CO2 20.8 (L) 09/23/2024    BUN 14 09/23/2024    CREATININE 1.10 09/23/2024    " GLUCOSE 107 (H) 09/23/2024    CALCIUM 9.2 09/23/2024    AST 27 09/23/2024    ALT 24 09/23/2024    ALKPHOS 99 09/23/2024    LABIL2 1.1 (L) 05/29/2016     Lab Results   Component Value Date    CKTOTAL 153 06/20/2019     Lab Results   Component Value Date    WBC 7.74 09/23/2024    HGB 16.0 09/23/2024    HCT 46.3 09/23/2024     09/23/2024     Lab Results   Component Value Date    INR 1.00 09/23/2024    INR 1.01 12/19/2023    INR 0.96 06/04/2023     Lab Results   Component Value Date    MG 2.1 06/20/2024     Lab Results   Component Value Date    TSH 2.670 03/20/2024    PSA 0.6 11/17/2023    CHLPL 232 (H) 04/05/2016    TRIG 74 03/20/2024    HDL 65 (H) 03/20/2024     (H) 03/20/2024      Lab Results   Component Value Date    BNP 3.0 09/26/2018       ECG 12 Lead    Date/Time: 10/7/2024 1:10 PM  Performed by: Leandro Daily MD    Authorized by: Leandro Daily MD  Comparison: compared with previous ECG from 9/24/2024  Similar to previous ECG  Rhythm: sinus rhythm  ST Segments: ST segments normal  T Waves: T waves normal            Assessment & Plan    Diagnosis Plan   1. Precordial pain, sounds atypical for angina with recent normal Lexiscan sestamibi study and normal high sensitive troponin x 4 with ongoing chest pains indicative of possibly noncardiac etiology.         2. Severe allergy to radiocontrast         3 Essential hypertension        3. Tobacco abuse          Recommendations  Continue with aspirin as tolerated.  In view of chronicity and atypical nature of his chest pains with recent normal nuclear stress test and normal high sensitive troponin x 4 with chest pains and with his history of allergy to radiocontrast dye which reportedly caused him to have rash and shortness of breath and with allergy to metoprolol that is required to be given prior to CT coronary angiogram I think the risk of doing a CT coronary angiogram or cardiac catheterization is much higher than the risk of not doing it at this  time.  Hence we will leave him alone.  Will consider this in the future if he has any significant EKG changes associated with chest pain or elevated troponin at any point in the future.  I have discussed this with Mr. Mckay and expressed understanding and is agreeable with this plan.  I have asked the patient to go to the emergency room if he has increasing chest pains.  He expressed understanding    Return in about 3 months (around 1/7/2025) for or sooner if needed.    As always, Tiera Valenzuela, BALDOMERO  I appreciate very much the opportunity to participate in the cardiovascular care of your patients. Please do not hesitate to call me with any questions with regards to Jaquan Mckay's evaluation and management.       With Best Regards,        Leandro Daily MD, [facility]    Please note that portions of this note were completed with a voice recognition program.

## 2024-10-09 DIAGNOSIS — F51.04 PSYCHOPHYSIOLOGICAL INSOMNIA: ICD-10-CM

## 2024-10-09 RX ORDER — MIRTAZAPINE 30 MG/1
45 TABLET, FILM COATED ORAL NIGHTLY
Qty: 45 TABLET | Refills: 5 | Status: SHIPPED | OUTPATIENT
Start: 2024-10-09

## 2024-10-09 RX ORDER — TEMAZEPAM 15 MG/1
15 CAPSULE ORAL NIGHTLY PRN
Qty: 30 CAPSULE | Refills: 0 | Status: SHIPPED | OUTPATIENT
Start: 2024-10-09

## 2024-10-09 NOTE — TELEPHONE ENCOUNTER
Pt called in irate about his medicine not having refills. Said he needed his sleeping medicine and his anxiety medicine refilled but could not remember the names. He cussed at me and started that he needed this sent in today and not next week.

## 2024-10-16 ENCOUNTER — OFFICE VISIT (OUTPATIENT)
Dept: FAMILY MEDICINE CLINIC | Facility: CLINIC | Age: 54
End: 2024-10-16
Payer: COMMERCIAL

## 2024-10-16 VITALS
OXYGEN SATURATION: 94 % | DIASTOLIC BLOOD PRESSURE: 70 MMHG | BODY MASS INDEX: 37.67 KG/M2 | TEMPERATURE: 97.9 F | HEART RATE: 74 BPM | SYSTOLIC BLOOD PRESSURE: 110 MMHG | HEIGHT: 67 IN | WEIGHT: 240 LBS

## 2024-10-16 DIAGNOSIS — Z23 ENCOUNTER FOR IMMUNIZATION: ICD-10-CM

## 2024-10-16 DIAGNOSIS — R20.0 BILATERAL HAND NUMBNESS: ICD-10-CM

## 2024-10-16 DIAGNOSIS — J45.31 MILD PERSISTENT ASTHMA WITH ACUTE EXACERBATION: ICD-10-CM

## 2024-10-16 DIAGNOSIS — E78.2 MIXED HYPERLIPIDEMIA: ICD-10-CM

## 2024-10-16 DIAGNOSIS — R73.09 ABNORMAL GLUCOSE: ICD-10-CM

## 2024-10-16 DIAGNOSIS — F41.8 DEPRESSION WITH ANXIETY: ICD-10-CM

## 2024-10-16 DIAGNOSIS — F51.04 PSYCHOPHYSIOLOGICAL INSOMNIA: ICD-10-CM

## 2024-10-16 DIAGNOSIS — M79.642 LEFT HAND PAIN: ICD-10-CM

## 2024-10-16 DIAGNOSIS — I10 ESSENTIAL HYPERTENSION: Primary | ICD-10-CM

## 2024-10-16 DIAGNOSIS — R05.1 ACUTE COUGH: ICD-10-CM

## 2024-10-16 DIAGNOSIS — R56.9 SEIZURES: ICD-10-CM

## 2024-10-16 PROCEDURE — 1159F MED LIST DOCD IN RCRD: CPT | Performed by: NURSE PRACTITIONER

## 2024-10-16 PROCEDURE — 3078F DIAST BP <80 MM HG: CPT | Performed by: NURSE PRACTITIONER

## 2024-10-16 PROCEDURE — 90656 IIV3 VACC NO PRSV 0.5 ML IM: CPT | Performed by: NURSE PRACTITIONER

## 2024-10-16 PROCEDURE — 1160F RVW MEDS BY RX/DR IN RCRD: CPT | Performed by: NURSE PRACTITIONER

## 2024-10-16 PROCEDURE — 90471 IMMUNIZATION ADMIN: CPT | Performed by: NURSE PRACTITIONER

## 2024-10-16 PROCEDURE — 99214 OFFICE O/P EST MOD 30 MIN: CPT | Performed by: NURSE PRACTITIONER

## 2024-10-16 PROCEDURE — 3074F SYST BP LT 130 MM HG: CPT | Performed by: NURSE PRACTITIONER

## 2024-10-16 PROCEDURE — 1125F AMNT PAIN NOTED PAIN PRSNT: CPT | Performed by: NURSE PRACTITIONER

## 2024-10-16 RX ORDER — TEMAZEPAM 15 MG/1
15 CAPSULE ORAL NIGHTLY PRN
Qty: 30 CAPSULE | Refills: 1 | Status: SHIPPED | OUTPATIENT
Start: 2024-10-16

## 2024-10-16 RX ORDER — BUPROPION HYDROCHLORIDE 75 MG/1
75 TABLET ORAL DAILY
Qty: 30 TABLET | Refills: 0 | Status: SHIPPED | OUTPATIENT
Start: 2024-10-16

## 2024-10-16 RX ORDER — GUAIFENESIN 600 MG/1
1200 TABLET, EXTENDED RELEASE ORAL 2 TIMES DAILY
Qty: 20 TABLET | Refills: 0 | Status: SHIPPED | OUTPATIENT
Start: 2024-10-16

## 2024-10-16 RX ORDER — METHYLPREDNISOLONE 4 MG
TABLET, DOSE PACK ORAL
Qty: 21 TABLET | Refills: 0 | Status: SHIPPED | OUTPATIENT
Start: 2024-10-16

## 2024-10-16 NOTE — PROGRESS NOTES
"Subjective   Jaquan Mckay is a 53 y.o. male.     Chief Complaint   Patient presents with    Hypertension       History of Present Illness     Hypertension-under the care of cardiology.  Patient is currently on lisinopril 30 mg.  No negative side effects.  He has intermittent chest pain.  He denies palpitations.  Cardiology [id number] been to see Dr. Daily on 10/7/2024.  Patient was noted to have a normal Lexiscan and normal high sensitive troponin x 4.  Chest pain is felt to be noncardiac in nature.  It was felt that the risk of doing a CT coronary angiogram was too high given his allergy to contrast dye and to metoprolol.  No further intervention is planned at this time.  Should patient have any EKG changes he will be reevaluated.  Last CP was \"about 2 weeks ago\".   URI symptoms-ongoing.  He is mostly having cough currently.  He is using breathing treatments.  He reports that cough is not having much production.  He is having some wheezing at times.  He has finished the medrol pack.  He has had to use his rescue inhaler x's 2 this week.    Hand pain-on the left.  He reports that he hit his hand on the nightstand and his 3rd and 4th digit is painful and tingling.  He reports that his  feels decreased.  His wrist is tender.  He reports that he is wearing hand brace at night but he continues to have numbness.  He had a EMG \"years ago\".  Some carpal tunnel.   Insomnia-acute on chronic due to his illness.  He reports that his anxiety is exacerbated due to the holidays.  He has had some financial changes and increased stress.  He continues Restoril.  He feels remeron is not helping his anxiety symptoms well.    Foot pain-MRI on 10/28 in [place].   He is off PT due to being out of visits.      The following portions of the patient's history were reviewed and updated as appropriate: CC, ROS, allergies, current medications, past family history, past medical history, past social history, past surgical history and " "problem list.      Review of Systems   Constitutional:  Positive for fatigue. Negative for appetite change, unexpected weight gain and unexpected weight loss.   HENT:  Positive for congestion and sore throat (but is improving.). Negative for ear pain, postnasal drip, rhinorrhea, swollen glands, trouble swallowing and voice change.    Eyes:  Negative for pain and visual disturbance.   Respiratory:  Positive for cough and shortness of breath. Negative for chest tightness and wheezing.    Cardiovascular:  Negative for chest pain, palpitations and leg swelling.   Gastrointestinal:  Negative for abdominal pain, blood in stool, constipation, diarrhea, nausea and indigestion.   Genitourinary:  Negative for dysuria, hematuria and urgency.   Musculoskeletal:  Positive for arthralgias, back pain, gait problem and myalgias. Negative for joint swelling.   Skin:  Negative for color change and skin lesions.   Allergic/Immunologic: Negative.    Neurological:  Positive for numbness (hands). Negative for dizziness and headache.   Hematological: Negative.    Psychiatric/Behavioral:  Positive for sleep disturbance and stress. Negative for dysphoric mood and suicidal ideas. The patient is not nervous/anxious.    All other systems reviewed and are negative.      Objective     /70   Pulse 74   Temp 97.9 °F (36.6 °C) (Temporal)   Ht 170.2 cm (67\")   Wt 109 kg (240 lb)   SpO2 94%   BMI 37.59 kg/m²     Physical Exam  Vitals reviewed.   Constitutional:       General: He is not in acute distress.     Appearance: He is well-developed. He is obese. He is not diaphoretic.   HENT:      Head: Normocephalic and atraumatic.      Jaw: No tenderness.      Right Ear: Hearing, tympanic membrane, ear canal and external ear normal.      Left Ear: Hearing, tympanic membrane, ear canal and external ear normal.      Nose: Nose normal. No nasal tenderness or congestion.      Right Sinus: No maxillary sinus tenderness or frontal sinus tenderness. "      Left Sinus: No maxillary sinus tenderness or frontal sinus tenderness.      Mouth/Throat:      Lips: Pink.      Mouth: Mucous membranes are moist.      Pharynx: Oropharynx is clear. Uvula midline.   Eyes:      General: Lids are normal. No scleral icterus.     Extraocular Movements:      Right eye: Normal extraocular motion and no nystagmus.      Left eye: Normal extraocular motion and no nystagmus.      Conjunctiva/sclera: Conjunctivae normal.      Pupils: Pupils are equal, round, and reactive to light.   Neck:      Thyroid: No thyromegaly or thyroid tenderness.      Vascular: No carotid bruit or JVD.      Trachea: No tracheal tenderness.   Cardiovascular:      Rate and Rhythm: Normal rate and regular rhythm.      Pulses:           Dorsalis pedis pulses are 2+ on the right side and 2+ on the left side.        Posterior tibial pulses are 2+ on the right side and 2+ on the left side.      Heart sounds: Normal heart sounds, S1 normal and S2 normal. No murmur heard.  Pulmonary:      Effort: Pulmonary effort is normal. No accessory muscle usage, prolonged expiration or respiratory distress.      Breath sounds: Normal breath sounds.      Comments: Scattered, intermittent wheezing in upper lung fields  Chest:      Chest wall: No tenderness.   Abdominal:      General: Bowel sounds are normal. There is no distension.      Palpations: Abdomen is soft. There is no hepatomegaly, splenomegaly or mass.      Tenderness: There is no abdominal tenderness.   Musculoskeletal:         General: Tenderness present.      Cervical back: Normal range of motion and neck supple.      Lumbar back: Tenderness present.      Right lower leg: No edema.      Left lower leg: No edema.      Right ankle: Tenderness present. Decreased range of motion.      Left ankle: Tenderness present. Decreased range of motion.      Right foot: Normal capillary refill. Tenderness present.      Left foot: Normal capillary refill. Tenderness present.       Comments: No muscular atrophy or flaccidity.   Lymphadenopathy:      Head:      Right side of head: No submental or submandibular adenopathy.      Left side of head: No submental or submandibular adenopathy.      Cervical: No cervical adenopathy.      Right cervical: No superficial cervical adenopathy.     Left cervical: No superficial cervical adenopathy.   Skin:     General: Skin is warm and dry.      Capillary Refill: Capillary refill takes less than 2 seconds.      Coloration: Skin is not jaundiced or pale.      Findings: No erythema.      Nails: There is no clubbing.   Neurological:      Mental Status: He is alert and oriented to person, place, and time.      Cranial Nerves: No cranial nerve deficit or facial asymmetry.      Sensory: No sensory deficit.      Motor: No weakness, tremor, atrophy or abnormal muscle tone.      Coordination: Coordination normal.      Gait: Gait abnormal (moderate antalgia).      Deep Tendon Reflexes: Reflexes are normal and symmetric.   Psychiatric:         Attention and Perception: He is attentive.         Mood and Affect: Mood normal. Mood is not anxious or depressed.         Speech: Speech normal.         Behavior: Behavior normal. Behavior is cooperative.         Thought Content: Thought content normal.         Cognition and Memory: Cognition normal.         Judgment: Judgment normal.         Diagnoses and all orders for this visit:    1. Essential hypertension (Primary)  -     CBC & Differential  -     Comprehensive Metabolic Panel  -     Lipid Panel    2. Mixed hyperlipidemia  -     Lipid Panel    3. Depression with anxiety  -     buPROPion (WELLBUTRIN) 75 MG tablet; Take 1 tablet by mouth Daily.  Dispense: 30 tablet; Refill: 0    4. Psychophysiological insomnia  Comments:  continue remeron and restoril  Overview:  With depression and anxiety      Orders:  -     temazepam (Restoril) 15 MG capsule; Take 1 capsule by mouth At Night As Needed for Sleep.  Dispense: 30 capsule;  Refill: 1    5. Acute cough  -     guaiFENesin (Mucinex) 600 MG 12 hr tablet; Take 2 tablets by mouth 2 (Two) Times a Day.  Dispense: 20 tablet; Refill: 0    6. Mild persistent asthma with acute exacerbation  -     methylPREDNISolone (MEDROL) 4 MG dose pack; TAKE BY MOUTH AS DIRECTED ON PACKAGE.  Dispense: 21 tablet; Refill: 0    7. Left hand pain  -     XR Hand 3+ View Left  -     EMG & Nerve Conduction Test; Future    8. Bilateral hand numbness  -     EMG & Nerve Conduction Test; Future    9. Seizures  -     TSH  -     T4, Free  -     Phenytoin Level, Total    10. Abnormal glucose  -     Hemoglobin A1c    11. Encounter for immunization  -     Fluzone >6mos ([id number])        Understands disease processes and need for medications.  Understands reasons for urgent and emergent care.  Patient (& family) verbalized agreement for treatment plan.   Emotional support and active listening provided.  Patient provided time to verbalize feelings.    CHAVEZ/PMDP reviewed today and consistent.  Will refill prescribed controlled medication today.  Patient is aware they cannot receive narcotics from any other provider except if under care of pain management or speciality clinic.  Risk and benefits of medication use has been reviewed.  History and physical exam exhibit continued safe and appropriate use of controlled substances.  The patient is aware of the potential for addiction and dependence.  This patient has been made aware of the appropriate use of such medications, including potential risk of somnolence, limited ability to drive and / or work safely, and potential for overdose.    It has also been made clear that these medications are for use by this patient only, without concomitant use of alcohol or other substances unless prescribed/advised by medical provider.  Patient understands they may be subject to UDS and pill counts at random.    Patient considered to be low risk for addiction due to use of single controlled  medications.  Patient understands and accepts these risks.  Patient need for medication will be reassessed at each visit.  Doses will be adjusted according to patient need and findings.    Goal of [place]: Patient will not have any adverse reactions of medication.  Patient will have improvement in sleep hygiene/pattern with use of Restoril as needed as directed.    CHAVEZ Patient Controlled Substance Report (from 10/17/2023 to 10/16/2024)    Dispensed  Strength Quantity Days Supply Provider Pharmacy   10/09/20[facility]   08/29/2024 Pregabalin 75MG 60 each 30 [facility]   08/26/2024 Hydrocodone Bitartrate/Ac 325MG/5MG 12 each 3 [facility]   08/14/2024 Temazepam 15MG 30 each 30 [facility]   07/16/2024 Temazepam 15MG 30 each 30 [facility]        ,.RTC 1 month, sooner if needed.             This document has been electronically signed by:  BALDOMERO Thao FNP-C Dragon disclaimer:  Part of this note may be an electronic transcription/translation of spoken language to printed text using the Dragon Dictation System.

## 2024-10-17 ENCOUNTER — HOSPITAL ENCOUNTER (OUTPATIENT)
Dept: GENERAL RADIOLOGY | Facility: HOSPITAL | Age: 54
Discharge: HOME | End: 2024-10-17
Payer: COMMERCIAL

## 2024-10-17 ENCOUNTER — LAB (OUTPATIENT)
Dept: LAB | Facility: HOSPITAL | Age: 54
End: 2024-10-17
Payer: COMMERCIAL

## 2024-10-17 PROCEDURE — 80061 LIPID PANEL: CPT | Performed by: NURSE PRACTITIONER

## 2024-10-17 PROCEDURE — 80185 ASSAY OF PHENYTOIN TOTAL: CPT | Performed by: NURSE PRACTITIONER

## 2024-10-17 PROCEDURE — 83036 HEMOGLOBIN GLYCOSYLATED A1C: CPT | Performed by: NURSE PRACTITIONER

## 2024-10-17 PROCEDURE — 36415 COLL VENOUS BLD VENIPUNCTURE: CPT | Performed by: NURSE PRACTITIONER

## 2024-10-17 PROCEDURE — 84439 ASSAY OF FREE THYROXINE: CPT | Performed by: NURSE PRACTITIONER

## 2024-10-17 PROCEDURE — 80053 COMPREHEN METABOLIC PANEL: CPT | Performed by: NURSE PRACTITIONER

## 2024-10-17 PROCEDURE — 84443 ASSAY THYROID STIM HORMONE: CPT | Performed by: NURSE PRACTITIONER

## 2024-10-17 PROCEDURE — 85025 COMPLETE CBC W/AUTO DIFF WBC: CPT | Performed by: NURSE PRACTITIONER

## 2024-10-17 PROCEDURE — 73130 X-RAY EXAM OF HAND: CPT

## 2024-10-18 LAB
ALBUMIN SERPL-MCNC: 4.1 G/DL (ref 3.5–5.2)
ALBUMIN/GLOB SERPL: 1.2 G/DL
ALP SERPL-CCNC: 108 U/L (ref 39–117)
ALT SERPL W P-5'-P-CCNC: 25 U/L (ref 1–41)
ANION GAP SERPL CALCULATED.3IONS-SCNC: 10 MMOL/L (ref 5–15)
AST SERPL-CCNC: 26 U/L (ref 1–40)
BASOPHILS # BLD AUTO: 0.02 10*3/MM3 (ref 0–0.2)
BASOPHILS NFR BLD AUTO: 0.3 % (ref 0–1.5)
BILIRUB SERPL-MCNC: 0.3 MG/DL (ref 0–1.2)
BUN SERPL-MCNC: 12 MG/DL (ref 6–20)
BUN/CREAT SERPL: 10.7 (ref 7–25)
CALCIUM SPEC-SCNC: 8.9 MG/DL (ref 8.6–10.5)
CHLORIDE SERPL-SCNC: 105 MMOL/L (ref 98–107)
CHOLEST SERPL-MCNC: 215 MG/DL (ref 0–200)
CO2 SERPL-SCNC: 24 MMOL/L (ref 22–29)
CREAT SERPL-MCNC: 1.12 MG/DL (ref 0.76–1.27)
DEPRECATED RDW RBC AUTO: 41.9 FL (ref 37–54)
EGFRCR SERPLBLD CKD-EPI 2021: 79.5 ML/MIN/1.73
EOSINOPHIL # BLD AUTO: 0.08 10*3/MM3 (ref 0–0.4)
EOSINOPHIL NFR BLD AUTO: 1.2 % (ref 0.3–6.2)
ERYTHROCYTE [DISTWIDTH] IN BLOOD BY AUTOMATED COUNT: 12.5 % (ref 12.3–15.4)
GLOBULIN UR ELPH-MCNC: 3.3 GM/DL
GLUCOSE SERPL-MCNC: 128 MG/DL (ref 65–99)
HBA1C MFR BLD: 5.4 % (ref 4.8–5.6)
HCT VFR BLD AUTO: 44.7 % (ref 37.5–51)
HDLC SERPL-MCNC: 66 MG/DL (ref 40–60)
HGB BLD-MCNC: 15.8 G/DL (ref 13–17.7)
IMM GRANULOCYTES # BLD AUTO: 0.02 10*3/MM3 (ref 0–0.05)
IMM GRANULOCYTES NFR BLD AUTO: 0.3 % (ref 0–0.5)
LDLC SERPL CALC-MCNC: 132 MG/DL (ref 0–100)
LDLC/HDLC SERPL: 1.96 {RATIO}
LYMPHOCYTES # BLD AUTO: 1.83 10*3/MM3 (ref 0.7–3.1)
LYMPHOCYTES NFR BLD AUTO: 26.4 % (ref 19.6–45.3)
MCH RBC QN AUTO: 32.5 PG (ref 26.6–33)
MCHC RBC AUTO-ENTMCNC: 35.3 G/DL (ref 31.5–35.7)
MCV RBC AUTO: 92 FL (ref 79–97)
MONOCYTES # BLD AUTO: 0.79 10*3/MM3 (ref 0.1–0.9)
MONOCYTES NFR BLD AUTO: 11.4 % (ref 5–12)
NEUTROPHILS NFR BLD AUTO: 4.2 10*3/MM3 (ref 1.7–7)
NEUTROPHILS NFR BLD AUTO: 60.4 % (ref 42.7–76)
NRBC BLD AUTO-RTO: 0 /100 WBC (ref 0–0.2)
PHENYTOIN SERPL-MCNC: 14 MCG/ML (ref 10–20)
PLATELET # BLD AUTO: 190 10*3/MM3 (ref 140–450)
PMV BLD AUTO: 11.6 FL (ref 6–12)
POTASSIUM SERPL-SCNC: 3.9 MMOL/L (ref 3.5–5.2)
PROT SERPL-MCNC: 7.4 G/DL (ref 6–8.5)
RBC # BLD AUTO: 4.86 10*6/MM3 (ref 4.14–5.8)
SODIUM SERPL-SCNC: 139 MMOL/L (ref 136–145)
T4 FREE SERPL-MCNC: 0.94 NG/DL (ref 0.92–1.68)
TRIGL SERPL-MCNC: 98 MG/DL (ref 0–150)
TSH SERPL DL<=0.05 MIU/L-ACNC: 2.15 UIU/ML (ref 0.27–4.2)
VLDLC SERPL-MCNC: 17 MG/DL (ref 5–40)
WBC NRBC COR # BLD AUTO: 6.94 10*3/MM3 (ref 3.4–10.8)

## 2024-10-22 NOTE — PROGRESS NOTES
Labs are good other than his cholesterol.  Be consistent with his rosuvastatin 40 mg.  His cholesterol needs to improve.

## 2024-10-24 DIAGNOSIS — E78.2 MIXED HYPERLIPIDEMIA: Primary | ICD-10-CM

## 2024-10-24 NOTE — PROGRESS NOTES
Queenieht.  Ask if he is willing to do a shot at home to help lower his cholesterol.  It would be one every 2 weeks.

## 2024-10-27 ENCOUNTER — APPOINTMENT (OUTPATIENT)
Dept: GENERAL RADIOLOGY | Facility: HOSPITAL | Age: 54
End: 2024-10-27
Payer: COMMERCIAL

## 2024-10-27 ENCOUNTER — HOSPITAL ENCOUNTER (EMERGENCY)
Facility: HOSPITAL | Age: 54
Discharge: HOME | End: 2024-10-27
Attending: EMERGENCY MEDICINE | Admitting: EMERGENCY MEDICINE
Payer: COMMERCIAL

## 2024-10-27 VITALS
HEART RATE: 71 BPM | WEIGHT: 240 LBS | SYSTOLIC BLOOD PRESSURE: 151 MMHG | BODY MASS INDEX: 37.67 KG/M2 | OXYGEN SATURATION: 100 % | DIASTOLIC BLOOD PRESSURE: 98 MMHG | HEIGHT: 67 IN | TEMPERATURE: 97.9 F | RESPIRATION RATE: 18 BRPM

## 2024-10-27 DIAGNOSIS — R10.30 LOWER ABDOMINAL PAIN: ICD-10-CM

## 2024-10-27 DIAGNOSIS — R11.0 NAUSEA: ICD-10-CM

## 2024-10-27 DIAGNOSIS — S93.401A SPRAIN OF RIGHT ANKLE, UNSPECIFIED LIGAMENT, INITIAL ENCOUNTER: Primary | ICD-10-CM

## 2024-10-27 DIAGNOSIS — R19.7 DIARRHEA, UNSPECIFIED TYPE: ICD-10-CM

## 2024-10-27 PROCEDURE — 73610 X-RAY EXAM OF ANKLE: CPT

## 2024-10-27 PROCEDURE — 99283 EMERGENCY DEPT VISIT LOW MDM: CPT

## 2024-10-27 PROCEDURE — 73610 X-RAY EXAM OF ANKLE: CPT | Performed by: RADIOLOGY

## 2024-10-27 RX ORDER — IBUPROFEN 600 MG/1
600 TABLET, FILM COATED ORAL EVERY 6 HOURS PRN
Qty: 20 TABLET | Refills: 0 | Status: SHIPPED | OUTPATIENT
Start: 2024-10-27

## 2024-10-27 RX ORDER — IBUPROFEN 600 MG/1
600 TABLET, FILM COATED ORAL ONCE
Status: COMPLETED | OUTPATIENT
Start: 2024-10-27 | End: 2024-10-27

## 2024-10-27 RX ADMIN — IBUPROFEN 600 MG: 600 TABLET, FILM COATED ORAL at 01:07

## 2024-10-27 NOTE — ED PROVIDER NOTES
"Subjective   History of Present Illness  Patient is a 53-year-old male who presents with complaints of right ankle pain.  Patient reports that he was walking into his house when he tripped and twisted his right ankle.  Patient denies hitting his head or any loss of consciousness.  Patient denies any other injuries during this event.  There is no obvious deformity noted to his right ankle and distal pulses are palpable.  There is slight swelling noted however no bruising.  Patient has no other complaints this date.  Patient presents private vehicle.        Review of Systems   Constitutional: Negative.  Negative for fever.   HENT: Negative.     Respiratory: Negative.     Cardiovascular: Negative.  Negative for chest pain.   Gastrointestinal: Negative.  Negative for abdominal pain.   Endocrine: Negative.    Genitourinary: Negative.  Negative for dysuria.   Musculoskeletal:         Right ankle pain   Skin: Negative.    Neurological: Negative.    Psychiatric/Behavioral: Negative.     All other systems reviewed and are negative.      Past Medical History:   Diagnosis Date    Allergic     Anxiety     Arthritis     Asthma     Body piercing     REPORTS CYLICONE IN EARS    Clotting disorder 2004    had a knee surgery    Coronary artery disease     Depression     DVT (deep venous thrombosis)     RIGHT RIGHT KNEE AFTER SURGERY YEARS AGO IN 2001 OR 2004    Elevated cholesterol     Gastric ulcer     GERD (gastroesophageal reflux disease)     H/O migraine     Headache     Heart attack     REPORTS \"LIGHT HEART ATTACK A LONG TIME AGO\"  \"EARLY 90'S\"    History of seizures     REPORTS LAST EPISODE WAS AROUND 1995.    Hostility     Hyperlipidemia     Hypertension     Knee pain, acute     Left    Low back pain     Lyme disease     Migraine     MRSA (methicillin resistant Staphylococcus aureus)     REPORTS LAST TESTED + 2004. WAS TREATED HE REPORTS.  RIGHT ARM, RIGHT KNEE.    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     Obesity     Poor historian     Carl " "Mountain spotted fever     Seizures     Sleep apnea     Tattoo     Wears glasses        Allergies   Allergen Reactions    Ciprofloxacin Anaphylaxis and Hives    Miralax [Polyethylene Glycol] Itching and Rash    Mobic [Meloxicam] Other (See Comments)     Pt states, \"It make my feet and hands go numb and I can't hardly walk.\"     Paxil [Paroxetine Hcl] Shortness Of Breath     Chest pain     Peanut-Containing Drug Products Anaphylaxis    Penicillins Anaphylaxis    Pristiq [Desvenlafaxine Succinate Er] Dizziness    Sulfa Antibiotics Anaphylaxis, Itching and Rash    Doxycycline Hives    Fish-Derived Products Hives    Isosorbide Nitrate Rash     Rash, hives, had to use inhaler.     Lyrica [Pregabalin] Hives    Movantik [Naloxegol] Rash    Seroquel [Quetiapine] Hives and Rash    Trulance [Plecanatide] Hives    Buspar [Buspirone] Rash    Clarithromycin Rash    Clindamycin/Lincomycin Rash    Codeine Rash    Contrast Dye (Echo Or Unknown Ct/Mr) Itching and Rash    Diltiazem Rash    Flomax [Tamsulosin] Hives    Gabapentin Rash    Iodinated Contrast Media Itching and Rash    Keflex [Cephalexin] Rash    Levocetirizine Rash    Linzess [Linaclotide] Rash    Metoprolol Rash    Prednisone Rash and Other (See Comments)     Face, feet, and legs go completely numb per patient    Robitussin Cough+ Chest Max St [Dextromethorphan-Guaifenesin] Itching    Shrimp (Diagnostic) Rash    Spironolactone Rash    Viibryd [Vilazodone Hcl] Itching and Rash    Zoloft [Sertraline Hcl] Hives and Itching       Past Surgical History:   Procedure Laterality Date    ABDOMINAL SURGERY      BACK SURGERY      BRAIN SURGERY  1986    Tumor removal     CARDIAC CATHETERIZATION N/A 9/28/2018    Procedure: Left Heart Cath;  Surgeon: Leandro Daily MD;  Location: [facility] CATH INVASIVE LOCATION;  Service: Cardiology    CHOLECYSTECTOMY      COLONOSCOPY      COLONOSCOPY N/A 8/2/2021    Procedure: COLONOSCOPY FOR SCREENING;  Surgeon: Irving Azar MD;  " Location:  [facility] OR;  Service: Gastroenterology;  Laterality: N/A;    CYST REMOVAL      pilonidal cyst    ELBOW EPICONDYLECTOMY Right 7/23/2020    Procedure: LATERAL EPICONDYLAR RELEASE;  Surgeon: Jose Ruiz MD;  Location: [facility] OR;  Service: Orthopedics;  Laterality: Right;    ENDOSCOPY      ENDOSCOPY N/A 8/2/2021    Procedure: ESOPHAGOGASTRODUODENOSCOPY WITH BIOPSY;  Surgeon: Irving Azar MD;  Location: [facility] OR;  Service: Gastroenterology;  Laterality: N/A;  esophageal dilatation to 20mm    FRACTURE SURGERY Right     elbow    KNEE ARTHROSCOPY Left 10/20/2017    Procedure: Diagnostic arthroscopy left knee with chondroplasty;  Surgeon: Marco Aguirre MD;  Location:  [facility] OR;  Service:     KNEE ARTHROSCOPY Left 1/11/2021    Procedure: KNEE DIAGNOSTIC ARTHROSCOPY WITH  CHONDROPLASTY patella, femoral and medial;  Surgeon: Raul Eagle MD;  Location: [facility] OR;  Service: Orthopedics;  Laterality: Left;    KNEE SURGERY Right     MOUTH SURGERY      FULL MOUTH EXTRACTION    OTHER SURGICAL HISTORY      REPORTS 7 TICKS REMOVED FROM RIGHT ARM IN 2001 OR 2002    TENNIS ELBOW RELEASE Right 7/23/2020    Procedure: RIGHT TENNIS ELBOW RELEASE;  Surgeon: Jose Ruiz MD;  Location: [facility] OR;  Service: Orthopedics;  Laterality: Right;    TUMOR EXCISION      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       Family History   Problem Relation Age of Onset    Diabetes Mother     Hypertension Mother     Stroke Mother     Diabetes Father     Skin cancer Father     Hypertension Father     Heart attack Father     Diabetes Brother     Hypertension Brother     Heart disease Maternal Aunt     Heart disease Maternal Uncle     Heart disease Paternal Aunt     Heart disease Paternal Uncle     Heart disease Maternal Grandmother     Heart disease Maternal Grandfather     Heart disease Paternal Grandmother     Heart disease Paternal Grandfather        Social History     Socioeconomic History     Marital status:      Spouse name: Becca    Number of children: 2    Years of education: 12   Tobacco Use    Smoking status: Every Day     Current packs/day: 1.00     Average packs/day: 1 pack/day for 17.6 years (17.6 ttl pk-yrs)     Types: Cigars, Cigarettes     Start date: 4/11/2022     Passive exposure: Current    Smokeless tobacco: Never   Vaping Use    Vaping status: Never Used   Substance and Sexual Activity    Alcohol use: No    Drug use: No    Sexual activity: Defer     Partners: Female     Birth control/protection: None           Objective   Physical Exam  Vitals and nursing note reviewed.   Constitutional:       General: He is not in acute distress.     Appearance: He is well-developed. He is not diaphoretic.   HENT:      Head: Normocephalic and atraumatic.      Right Ear: External ear normal.      Left Ear: External ear normal.      Nose: Nose normal.   Eyes:      Conjunctiva/sclera: Conjunctivae normal.      Pupils: Pupils are equal, round, and reactive to light.   Neck:      Vascular: No JVD.      Trachea: No tracheal deviation.   Cardiovascular:      Rate and Rhythm: Normal rate and regular rhythm.      Heart sounds: Normal heart sounds. No murmur heard.  Pulmonary:      Effort: Pulmonary effort is normal. No respiratory distress.      Breath sounds: Normal breath sounds. No wheezing.   Abdominal:      General: Bowel sounds are normal.      Palpations: Abdomen is soft.      Tenderness: There is no abdominal tenderness.   Musculoskeletal:         General: Swelling, tenderness and signs of injury present. No deformity. Normal range of motion.      Cervical back: Normal range of motion and neck supple.   Skin:     General: Skin is warm and dry.      Coloration: Skin is not pale.      Findings: No bruising, erythema or rash.   Neurological:      Mental Status: He is alert and oriented to person, place, and time.      Cranial Nerves: No cranial nerve deficit.   Psychiatric:         Behavior:  Behavior normal.         Thought Content: Thought content normal.     XR Ankle 3+ View Right    Result Date: 10/27/2024   1.  No acute fracture. 2.  No acute dislocation. 3.  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 4.  Mild soft tissue swelling overlying the lateral malleolus 5.  Mild soft tissue swelling anterior to the tibiotalar joint.  This report was finalized on 10/27/2024 3:29 AM by Marco Nieto MD.         Procedures           ED Course                                               Medical Decision Making  Patient is a 53-year-old male who presents with complaints of right ankle pain.  Patient reports that he was walking into his house when he tripped and twisted his right ankle.  Patient denies hitting his head or any loss of consciousness.  Patient denies any other injuries during this event.  There is no obvious deformity noted to his right ankle and distal pulses are palpable.  There is slight swelling noted however no bruising.  Patient has no other complaints this date.  Patient presents private vehicle.    Amount and/or Complexity of Data Reviewed  Radiology: ordered.    Risk  Prescription drug management.        Final diagnoses:   Sprain of right ankle, unspecified ligament, initial encounter       ED Disposition  ED Disposition       ED Disposition   Discharge    Condition   Stable    Comment   --               Tiera Valenzuela, APRN  [address] [facility] [address]  [phone]    Schedule an appointment as soon as possible for a visit   As needed    [facility] EMERGENCY DEPARTMENT  [address] [place] [id number] [phone]  Go to   If symptoms worsen         Medication List        New Prescriptions      ibuprofen 600 MG tablet  Commonly known as: ADVIL,MOTRIN  Take 1 tablet by mouth Every 6 (Six) Hours As Needed for Mild Pain.               Where to Get Your Medications        These medications were sent to [place], [place] - [address]  - [phone] [facility] [phone] FX  [address] [facility] [address], [facility] [address]      Phone: [phone]   ibuprofen 600 MG tablet            Makenna Dobbs, APRN  10/27/24 0348

## 2024-10-27 NOTE — ED NOTES
MEDICAL SCREENING:    Reason for Visit: Ankle injury    Patient initially seen in triage.  The patient was advised further evaluation and diagnostic testing will be needed, some of the treatment and testing will be initiated in the lobby in order to begin the process.  The patient will be returned to the waiting area for the time being and possibly be re-assessed by a subsequent ED provider.  The patient will be brought back to the treatment area in as timely manner as possible.           Niraj Lay MD  10/27/24 0021

## 2024-10-28 RX ORDER — DICYCLOMINE HYDROCHLORIDE 10 MG/1
10 CAPSULE ORAL
Qty: 30 CAPSULE | Refills: 0 | Status: SHIPPED | OUTPATIENT
Start: 2024-10-28

## 2024-10-28 RX ORDER — ONDANSETRON 4 MG/1
4 TABLET, FILM COATED ORAL EVERY 8 HOURS PRN
Qty: 20 TABLET | Refills: 0 | Status: SHIPPED | OUTPATIENT
Start: 2024-10-28

## 2024-10-28 RX ORDER — COLESTIPOL HYDROCHLORIDE 5 G/5G
5 GRANULE, FOR SUSPENSION ORAL DAILY
Qty: 30 EACH | Refills: 2 | Status: SHIPPED | OUTPATIENT
Start: 2024-10-28

## 2024-10-29 ENCOUNTER — SPECIALTY PHARMACY (OUTPATIENT)
Dept: PHARMACY | Facility: HOSPITAL | Age: 54
End: 2024-10-29
Payer: COMMERCIAL

## 2024-10-29 ENCOUNTER — DISEASE STATE MANAGEMENT VISIT (OUTPATIENT)
Dept: PHARMACY | Facility: HOSPITAL | Age: 54
End: 2024-10-29
Payer: COMMERCIAL

## 2024-10-29 DIAGNOSIS — E78.2 MIXED HYPERLIPIDEMIA: Primary | ICD-10-CM

## 2024-10-29 NOTE — PROGRESS NOTES
" [facility]ty Pharmacy Patient Management Program  Lipid Management Program - PCSK9i Initial Assessment   Jaquan Mckay is a 53 y.o. male referred by their provider, Tiera Valenzuela, to the Hyperlipidemia Patient Management program offered by [facility] for Lipid Management.      An initial outreach was conducted, including assessment of therapy appropriateness and specialty medication education for Repatha. The patient was introduced to services offered by [facility], including: regular assessments, refill coordination, curbside pick-up or mail order delivery options, prior authorization maintenance, and financial assistance programs as applicable. The patient was also provided with contact information for the pharmacy team.     Jaquan Mckay is  treated for ASCVD and hyperlipidemia, and currently takes crestor 40 mg.  In the past, Pt has tried lipitor 40 mg, zocor 10 mg, pravastatin 80 mg and is not statin intolerant]. The patient denies any allergies to latex.      Insurance Coverage & Financial Support  [employer] [place]     Relevant Past Medical History and Comorbidities  Relevant medical history and concomitant health conditions were discussed with the patient. The patient's chart has been reviewed for relevant past medical history and comorbid conditions and updated as necessary.  Past Medical History:   Diagnosis Date    Allergic     Anxiety     Arthritis     Asthma     Body piercing     REPORTS CYLICONE IN EARS    Clotting disorder 2004    had a knee surgery    Coronary artery disease     Depression     DVT (deep venous thrombosis)     RIGHT RIGHT KNEE AFTER SURGERY YEARS AGO IN 2001 OR 2004    Elevated cholesterol     Gastric ulcer     GERD (gastroesophageal reflux disease)     H/O migraine     Headache     Heart attack     REPORTS \"LIGHT HEART ATTACK A LONG TIME AGO\"  \"EARLY 90'S\"    History of " "seizures     REPORTS LAST EPISODE WAS AROUND 1995.    Hostility     Hyperlipidemia     Hypertension     Knee pain, acute     Left    Low back pain     Lyme disease     Migraine     MRSA (methicillin resistant Staphylococcus aureus)     REPORTS LAST TESTED + 2004. WAS TREATED HE REPORTS.  RIGHT ARM, RIGHT KNEE.    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     Obesity     Poor historian     [place] spotted fever     Seizures     Sleep apnea     Tattoo     Wears glasses      Social History     Socioeconomic History    Marital status:      Spouse name: Becca    Number of children: 2    Years of education: 12   Tobacco Use    Smoking status: Every Day     Current packs/day: 1.00     Average packs/day: 1 pack/day for 17.7 years (17.7 ttl pk-yrs)     Types: Cigars, Cigarettes     Start date: 4/11/2022     Passive exposure: Current    Smokeless tobacco: Never   Vaping Use    Vaping status: Never Used   Substance and Sexual Activity    Alcohol use: No    Drug use: No    Sexual activity: Defer     Partners: Female     Birth control/protection: None            Allergies  Known allergies and reactions were discussed with the patient. The patient's chart has been reviewed for  allergy information and updated as necessary.   Allergies   Allergen Reactions    Ciprofloxacin Anaphylaxis and Hives    Miralax [Polyethylene Glycol] Itching and Rash    Mobic [Meloxicam] Other (See Comments)     Pt states, \"It make my feet and hands go numb and I can't hardly walk.\"     Paxil [Paroxetine Hcl] Shortness Of Breath     Chest pain     Peanut-Containing Drug Products Anaphylaxis    Penicillins Anaphylaxis    Pristiq [Desvenlafaxine Succinate Er] Dizziness    Sulfa Antibiotics Anaphylaxis, Itching and Rash    Doxycycline Hives    Fish-Derived Products Hives    Isosorbide Nitrate Rash     Rash, hives, had to use inhaler.     Lyrica [Pregabalin] Hives    Movantik [Naloxegol] Rash    Seroquel [Quetiapine] Hives and Rash    Trulance [Plecanatide] Hives "    Buspar [Buspirone] Rash    Clarithromycin Rash    Clindamycin/Lincomycin Rash    Codeine Rash    Contrast Dye (Echo Or Unknown Ct/Mr) Itching and Rash    Diltiazem Rash    Flomax [Tamsulosin] Hives    Gabapentin Rash    Iodinated Contrast Media Itching and Rash    Keflex [Cephalexin] Rash    Levocetirizine Rash    Linzess [Linaclotide] Rash    Metoprolol Rash    Prednisone Rash and Other (See Comments)     Face, feet, and legs go completely numb per patient    Robitussin Cough+ Chest Max St [Dextromethorphan-Guaifenesin] Itching    Shrimp (Diagnostic) Rash    Spironolactone Rash    Viibryd [Vilazodone Hcl] Itching and Rash    Zoloft [Sertraline Hcl] Hives and Itching            Relevant Laboratory Values  Relevant laboratory values were discussed with the patient. The following specialty medication dose adjustment(s) are recommended: See plan, if applicable   Lab Results   Component Value Date    CHOL 215 (H) 10/17/2024    CHLPL 232 (H) 04/05/2016    TRIG 98 10/17/2024    HDL 66 (H) 10/17/2024     (H) 10/17/2024       Current Medication List  This medication list has been reviewed with the patient and evaluated for any interactions or necessary modifications/recommendations, and updated to include all prescription medications, OTC medications, and supplements the patient is currently taking.  This list reflects what is contained in the patient's profile, which has also been marked as reviewed to communicate to other providers it is the most up to date version of the patient's current medication therapy.     Current Outpatient Medications:     acetaminophen (TYLENOL) 500 MG tablet, Take 1 tablet by mouth Every 6 (Six) Hours As Needed for Mild Pain., Disp: 30 tablet, Rfl: 2    albuterol sulfate  (90 Base) MCG/ACT inhaler, Inhale 2 puffs by mouth every 4 hours as needed., Disp: 18 g, Rfl: 3    Alcohol Swabs (Alcohol Wipes) 70 % pads, 1 each 2 (Two) Times a Day., Disp: 100 each, Rfl: 5    aspirin  (Aspirin EC Adult Low Dose) 81 MG EC tablet, Take 1 tablet by mouth Daily., Disp: 30 tablet, Rfl: 3    azelastine (ASTELIN) 0.1 % nasal spray, , Disp: , Rfl:     azithromycin (Zithromax Z-Clark) 250 MG tablet, Take 2 tablets by mouth on day 1, then 1 tablet daily on days 2-5 (Patient not taking: Reported on 10/29/2024), Disp: 6 tablet, Rfl: 0    buPROPion (WELLBUTRIN) 75 MG tablet, Take 1 tablet by mouth Daily., Disp: 30 tablet, Rfl: 0    calcium polycarbophil (FiberCon) 625 MG tablet, Take 1 tablet by mouth 2 (Two) Times a Day., Disp: 60 tablet, Rfl: 3    clobetasol (TEMOVATE) 0.05 % ointment, Apply 1 application topically to the appropriate area as directed 2 (Two) Times a Day., Disp: 60 g, Rfl: 2    colestipol (Colestid) 5 g packet, Take 1 packet mixed in at least 90ml of any beverage by mouth Daily., Disp: 30 each, Rfl: 2    dexlansoprazole (Dexilant) 60 MG capsule, Take 1 capsule by mouth 2 (Two) Times a Day., Disp: 60 capsule, Rfl: 13    dicyclomine (BENTYL) 10 MG capsule, Take 1 capsule by mouth 4 (Four) Times a Day Before Meals & at Bedtime., Disp: 30 capsule, Rfl: 0    Dilantin 100 MG capsule, Take 2 capsules by mouth 2 (Two) Times a Day., Disp: 120 capsule, Rfl: 2    diphenhydrAMINE (Benadryl Allergy) 25 MG tablet, Take 1-2 tablets by mouth Every 8 (Eight) Hours As Needed for Itching (or rash)., Disp: 120 tablet, Rfl: 2    Elastic Bandages & Supports (ACE Ankle Brace) misc, 1 each Daily., Disp: 1 each, Rfl: 0    EPINEPHrine (EPIPEN) 0.3 MG/0.3ML solution auto-injector injection, Inject Intramuscularly into lateral thigh as needed for treatment of anaphylaxis, then go to the ER or call 911., Disp: 2 each, Rfl: 2    ergocalciferol (ERGOCALCIFEROL) 1.25 MG (33836 UT) capsule, Take 1 capsule by mouth 1 (One) Time Per Week., Disp: 4 capsule, Rfl: 3    Evolocumab (REPATHA) solution auto-injector SureClick injection, Inject 1 mL under the skin into the appropriate area as directed Every 14 (Fourteen) Days., Disp: 2  mL, Rfl: 5    fluticasone (FLONASE) 50 MCG/ACT nasal spray, Use 1 spray in each nostril Twice a day for 30 days, Disp: 16 g, Rfl: 11    fluticasone (Flovent HFA) 110 MCG/ACT inhaler, Inhale 1 puff by mouth 2 (Two) Times a Day., Disp: 12 g, Rfl: 5    fluticasone (FLOVENT HFA) 44 MCG/ACT inhaler, Inhale 1 puff by mouth twice a day., Disp: 10.6 g, Rfl: 5    glucose blood (OneTouch Ultra) test strip, Use as instructed 2 times daily and as needed, Disp: 100 each, Rfl: 3    guaiFENesin (Mucinex) 600 MG 12 hr tablet, Take 2 tablets by mouth 2 (Two) Times a Day., Disp: 20 tablet, Rfl: 0    ibuprofen (ADVIL,MOTRIN) 600 MG tablet, Take 1 tablet by mouth Every 6 (Six) Hours As Needed for Mild Pain., Disp: 20 tablet, Rfl: 0    ipratropium-albuterol (COMBIVENT RESPIMAT)  MCG/ACT inhaler, Inhale 1 puff by mouth 4 (Four) Times a Day As Needed for Wheezing., Disp: 4 g, Rfl: 0    ipratropium-albuterol (DUO-NEB) 0.5-2.5 mg/3 ml nebulizer, Inhale the contents of 3 mL vial nebulizer every 6 hours as needed., Disp: 360 mL, Rfl: 3    Lancets (OneTouch Delica Plus Eicmtv19N) misc, Use as instructed 2 times daily and as needed, Disp: 100 each, Rfl: 3    levocetirizine (XYZAL) 5 MG tablet, Take 1 tablet by mouth in the evening Once a day 30 days, Disp: 30 tablet, Rfl: 11    lisinopril (PRINIVIL,ZESTRIL) 30 MG tablet, Take 1 tablet by mouth Daily for blood pressure., Disp: 90 tablet, Rfl: 1    loratadine (CLARITIN) 10 MG tablet, Take 1 tablet by mouth Daily As Needed for Allergies., Disp: 90 tablet, Rfl: 2    methylPREDNISolone (MEDROL) 4 MG dose pack, TAKE BY MOUTH AS DIRECTED ON PACKAGE., Disp: 21 tablet, Rfl: 0    miconazole (Micatin) 2 % cream, Apply topically to the appropriate area as directed 2 (Two) Times a Day To feet., Disp: 30 g, Rfl: 0    mirtazapine (REMERON) 30 MG tablet, Take 1 & 1/2  tablets by mouth Every Night., Disp: 45 tablet, Rfl: 5    montelukast (SINGULAIR) 10 MG tablet, Take 1 tablet by mouth., Disp: , Rfl:      "multivitamin with minerals tablet tablet, Take 1 tablet by mouth Daily., Disp: 30 tablet, Rfl: 12    ondansetron (Zofran) 4 MG tablet, Take 1 tablet by mouth Every 8 (Eight) Hours As Needed for Nausea., Disp: 20 tablet, Rfl: 0    rosuvastatin (Crestor) 40 MG tablet, Take 1 tablet by mouth Daily., Disp: 30 tablet, Rfl: 3    senna (Senokot) 8.6 MG tablet, Take 2 tablets by mouth 2 (Two) Times a Day., Disp: 120 tablet, Rfl: 0    sodium chloride 0.65 % nasal spray, Use 1-3 sprays in each nostril Three times a day as needed 30 days, Disp: 44 mL, Rfl: 11    tamsulosin (FLOMAX) 0.4 MG capsule 24 hr capsule, Take 1 capsule by mouth Daily., Disp: 90 capsule, Rfl: 3    tamsulosin (Flomax) 0.4 MG capsule 24 hr capsule, Take 1 capsule by mouth Daily., Disp: 30 capsule, Rfl: 3    temazepam (Restoril) 15 MG capsule, Take 1 capsule by mouth At Night As Needed for Sleep., Disp: 30 capsule, Rfl: 1    TiZANidine (Zanaflex) 4 MG capsule, Take 1 capsule by mouth 3 (Three) Times a Day., Disp: 30 capsule, Rfl: 5  No current facility-administered medications for this visit.         Patient was not cooperative when reviewing medication list. Kept repeating \" nothing on my list has changed.\" This was the firs time patient has been seen by the [facility].    Drug Interactions  None with repatha    Adherence and Self-Administration  Adherence related to the patient's specialty therapy was discussed with the patient. The Adherence segment of this outreach has been reviewed and updated.              Methods for Supporting Patient Adherence and/or Self-Administration: patient refused to administer injection himself. He was informed he will need to come to clinic every 2 weeks for injection and he said that will be fine    Open Medication Therapy Problems  No medication therapy recommendations to display    Goals of Therapy  Goals related to the patient's specialty therapy were discussed with the patient. The Patient Goals segment of this " outreach has been reviewed and updated.   Goals Addressed Today    None         Medication Assessment & Plan  Medication Therapy Changes: Patient started today on repatha 140 mg SC every 2 weeks.   Injection training and medication education provided.   I administered the medication into the back of his left arm  Patient will not give himself a shot and will require coming in to [facility] clinic every 2 weeks to receive injection  Welcome information and patient satisfaction survey to be sent by specialty pharmacy team with patient's initial fill.  Related Plans, Therapy Recommendations, or Therapy Problems to Be Addressed: none  Patient will need lipid panel in 6 weeks, order placed.   Patient will continue regular follow-up with cardiology.   Patient will follow up in clinic for next injection. Care Coordinator to set up future refill outreaches, coordinate prescription delivery, and escalate clinical questions to pharmacist.  Pharmacist to perform regular assessments no more than (6) months from the previous assessment. Will follow-up in 6 months, or sooner if needed.    Initial Education Provided for Specialty Medication  The patient has been provided with the following education and any applicable administration techniques (i.e. self-injection) have been demonstrated for the therapies indicated. All questions and concerns have been addressed prior to the patient receiving the medication, and the patient has verbalized comprehension of the education and any materials provided. Additional patient education shall be provided and documented upon request by the patient, provider, or payer.      Initial Education Provided for Repatha    Repatha is used to lower LDL, or bad cholesterol, to help reduce your chance of a heart attack or stroke.  You should give your injection once every 14 days days.  Your doctor will likely keep you on this medication indefinitely as long as it is working for you and not causing any adverse  effects.      This medication should be kept in the refrigerator until you are ready to use it.  Once removed from the refrigerator, it is good at room temperature for 30 days.  Do not leave in your car or expose to extreme heat.  Do not shake or freeze.  Dispose the used syringe/device in a sharps container.     This injection is a subcutaneous injection, which means just under the skin.  It is important to choose an area of your skin that is not tender, bruised, cut or has scars or stretch marks.  You can inject into your abdomen (except for 2 inches around your navel), your thigh or your upper arm.  Do not administer with other drugs.   Rotate injection sites each time you give the injection. Wash your hands prior to giving yourself an injection and use an alcohol wipe to clean the area of the injection and allow to dry prior to injecting.      If you miss a dose, take it as soon as you remember and resume the original schedule if it is within 7 days from the missed dose.  If an every 2 week dose is not administered within 7 days, wait until the next dose on the original schedule.  If a once-monthly dose is not administered within 7 days, administer the dose and start a new schedule based on this date.     Be sure to let your doctor or pharmacist know of any medication changes, including OTC and herbal supplements.     Adverse Effects  Reviewed with patient and education on management provided.     Sore Throat  Injection site pain  Itching or irritation   Flu-like symptoms  Signs of an allergic reaction     Immunizations  While there are no immunizations that you need to get specifically because you are on this drug, it is important to keep up with your recommended routine vaccinations.     The patient has been provided with the following education and any applicable administration techniques (i.e. self-injection) have been demonstrated for the therapies indicated. All questions and concerns have been addressed  prior to the patient receiving the medication, and the patient has verbalized understanding of the education and any materials provided.  Additional patient education shall be provided and documented upon request by the patient, provider or payer.        Attestation      I attest the patient was actively involved in and has agreed to the above plan of care. If the prescribed therapy is at any point deemed not appropriate based on the current or future assessments, a consultation will be initiated with the patient's specialty care provider to determine the best course of action. The revised plan of therapy will be documented along with any required assessments and/or additional patient education provided.     Melinda Case, PharmD  10/29/2024  15:15 EDT

## 2024-10-29 NOTE — PROGRESS NOTES
" [facility]ication Management Clinic/ Specialty Pharmacy Patient Management Program  Lipid Management Program - PCSK9i Initial Assessment   Jaquan Mckay is a 53 y.o. male referred by their provider, Tiera Valenzuela, to the Hyperlipidemia Patient Management program offered by [facility] for Lipid Management.      An initial outreach was conducted, including assessment of therapy appropriateness and specialty medication education for Repatha. The patient was introduced to services offered by [facility], including: regular assessments, refill coordination, curbside pick-up or mail order delivery options, prior authorization maintenance, and financial assistance programs as applicable. The patient was also provided with contact information for the pharmacy team.     Jaquan Mckay is  treated for ASCVD and hyperlipidemia, and currently takes crestor 40 mg.  In the past, Pt has tried lipitor 40 mg, zocor 10 mg, pravastatin 80 mg and is not statin intolerant]. The patient denies any allergies to latex.      Insurance Coverage & Financial Support  [employer] [place]     Relevant Past Medical History and Comorbidities  Relevant medical history and concomitant health conditions were discussed with the patient. The patient's chart has been reviewed for relevant past medical history and comorbid conditions and updated as necessary.  Past Medical History:   Diagnosis Date    Allergic     Anxiety     Arthritis     Asthma     Body piercing     REPORTS CYLICONE IN EARS    Clotting disorder 2004    had a knee surgery    Coronary artery disease     Depression     DVT (deep venous thrombosis)     RIGHT RIGHT KNEE AFTER SURGERY YEARS AGO IN 2001 OR 2004    Elevated cholesterol     Gastric ulcer     GERD (gastroesophageal reflux disease)     H/O migraine     Headache     Heart attack     REPORTS \"LIGHT HEART ATTACK A LONG TIME AGO\"  \"EARLY 90'S\"    History of " "seizures     REPORTS LAST EPISODE WAS AROUND 1995.    Hostility     Hyperlipidemia     Hypertension     Knee pain, acute     Left    Low back pain     Lyme disease     Migraine     MRSA (methicillin resistant Staphylococcus aureus)     REPORTS LAST TESTED + 2004. WAS TREATED HE REPORTS.  RIGHT ARM, RIGHT KNEE.    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     Obesity     Poor historian     [place] spotted fever     Seizures     Sleep apnea     Tattoo     Wears glasses      Social History     Socioeconomic History    Marital status:      Spouse name: Becca    Number of children: 2    Years of education: 12   Tobacco Use    Smoking status: Every Day     Current packs/day: 1.00     Average packs/day: 1 pack/day for 17.7 years (17.7 ttl pk-yrs)     Types: Cigars, Cigarettes     Start date: 4/11/2022     Passive exposure: Current    Smokeless tobacco: Never   Vaping Use    Vaping status: Never Used   Substance and Sexual Activity    Alcohol use: No    Drug use: No    Sexual activity: Defer     Partners: Female     Birth control/protection: None       Problem list reviewed by Melinda Case, PharmD on 10/29/2024 at  2:58 PM    Allergies  Known allergies and reactions were discussed with the patient. The patient's chart has been reviewed for  allergy information and updated as necessary.   Allergies   Allergen Reactions    Ciprofloxacin Anaphylaxis and Hives    Miralax [Polyethylene Glycol] Itching and Rash    Mobic [Meloxicam] Other (See Comments)     Pt states, \"It make my feet and hands go numb and I can't hardly walk.\"     Paxil [Paroxetine Hcl] Shortness Of Breath     Chest pain     Peanut-Containing Drug Products Anaphylaxis    Penicillins Anaphylaxis    Pristiq [Desvenlafaxine Succinate Er] Dizziness    Sulfa Antibiotics Anaphylaxis, Itching and Rash    Doxycycline Hives    Fish-Derived Products Hives    Isosorbide Nitrate Rash     Rash, hives, had to use inhaler.     Lyrica [Pregabalin] Hives    Movantik [Naloxegol] " Rash    Seroquel [Quetiapine] Hives and Rash    Trulance [Plecanatide] Hives    Buspar [Buspirone] Rash    Clarithromycin Rash    Clindamycin/Lincomycin Rash    Codeine Rash    Contrast Dye (Echo Or Unknown Ct/Mr) Itching and Rash    Diltiazem Rash    Flomax [Tamsulosin] Hives    Gabapentin Rash    Iodinated Contrast Media Itching and Rash    Keflex [Cephalexin] Rash    Levocetirizine Rash    Linzess [Linaclotide] Rash    Metoprolol Rash    Prednisone Rash and Other (See Comments)     Face, feet, and legs go completely numb per patient    Robitussin Cough+ Chest Max St [Dextromethorphan-Guaifenesin] Itching    Shrimp (Diagnostic) Rash    Spironolactone Rash    Viibryd [Vilazodone Hcl] Itching and Rash    Zoloft [Sertraline Hcl] Hives and Itching       Allergies reviewed by Melinda Case, PharmD on 10/29/2024 at  2:57 PM    Relevant Laboratory Values  Relevant laboratory values were discussed with the patient. The following specialty medication dose adjustment(s) are recommended: See plan, if applicable   Lab Results   Component Value Date    CHOL 215 (H) 10/17/2024    CHLPL 232 (H) 04/05/2016    TRIG 98 10/17/2024    HDL 66 (H) 10/17/2024     (H) 10/17/2024       Current Medication List  This medication list has been reviewed with the patient and evaluated for any interactions or necessary modifications/recommendations, and updated to include all prescription medications, OTC medications, and supplements the patient is currently taking.  This list reflects what is contained in the patient's profile, which has also been marked as reviewed to communicate to other providers it is the most up to date version of the patient's current medication therapy.     Current Outpatient Medications:     acetaminophen (TYLENOL) 500 MG tablet, Take 1 tablet by mouth Every 6 (Six) Hours As Needed for Mild Pain., Disp: 30 tablet, Rfl: 2    albuterol sulfate  (90 Base) MCG/ACT inhaler, Inhale 2 puffs by mouth every 4  hours as needed., Disp: 18 g, Rfl: 3    Alcohol Swabs (Alcohol Wipes) 70 % pads, 1 each 2 (Two) Times a Day., Disp: 100 each, Rfl: 5    aspirin (Aspirin EC Adult Low Dose) 81 MG EC tablet, Take 1 tablet by mouth Daily., Disp: 30 tablet, Rfl: 3    azelastine (ASTELIN) 0.1 % nasal spray, , Disp: , Rfl:     buPROPion (WELLBUTRIN) 75 MG tablet, Take 1 tablet by mouth Daily., Disp: 30 tablet, Rfl: 0    calcium polycarbophil (FiberCon) 625 MG tablet, Take 1 tablet by mouth 2 (Two) Times a Day., Disp: 60 tablet, Rfl: 3    clobetasol (TEMOVATE) 0.05 % ointment, Apply 1 application topically to the appropriate area as directed 2 (Two) Times a Day., Disp: 60 g, Rfl: 2    colestipol (Colestid) 5 g packet, Take 1 packet mixed in at least 90ml of any beverage by mouth Daily., Disp: 30 each, Rfl: 2    dexlansoprazole (Dexilant) 60 MG capsule, Take 1 capsule by mouth 2 (Two) Times a Day., Disp: 60 capsule, Rfl: 13    dicyclomine (BENTYL) 10 MG capsule, Take 1 capsule by mouth 4 (Four) Times a Day Before Meals & at Bedtime., Disp: 30 capsule, Rfl: 0    Dilantin 100 MG capsule, Take 2 capsules by mouth 2 (Two) Times a Day., Disp: 120 capsule, Rfl: 2    diphenhydrAMINE (Benadryl Allergy) 25 MG tablet, Take 1-2 tablets by mouth Every 8 (Eight) Hours As Needed for Itching (or rash)., Disp: 120 tablet, Rfl: 2    Elastic Bandages & Supports (ACE Ankle Brace) misc, 1 each Daily., Disp: 1 each, Rfl: 0    EPINEPHrine (EPIPEN) 0.3 MG/0.3ML solution auto-injector injection, Inject Intramuscularly into lateral thigh as needed for treatment of anaphylaxis, then go to the ER or call 911., Disp: 2 each, Rfl: 2    ergocalciferol (ERGOCALCIFEROL) 1.25 MG (29321 UT) capsule, Take 1 capsule by mouth 1 (One) Time Per Week., Disp: 4 capsule, Rfl: 3    Evolocumab (REPATHA) solution auto-injector SureClick injection, Inject 1 mL under the skin into the appropriate area as directed Every 14 (Fourteen) Days., Disp: 2 mL, Rfl: 5    fluticasone (FLONASE) 50  MCG/ACT nasal spray, Use 1 spray in each nostril Twice a day for 30 days, Disp: 16 g, Rfl: 11    fluticasone (Flovent HFA) 110 MCG/ACT inhaler, Inhale 1 puff by mouth 2 (Two) Times a Day., Disp: 12 g, Rfl: 5    fluticasone (FLOVENT HFA) 44 MCG/ACT inhaler, Inhale 1 puff by mouth twice a day., Disp: 10.6 g, Rfl: 5    glucose blood (OneTouch Ultra) test strip, Use as instructed 2 times daily and as needed, Disp: 100 each, Rfl: 3    guaiFENesin (Mucinex) 600 MG 12 hr tablet, Take 2 tablets by mouth 2 (Two) Times a Day., Disp: 20 tablet, Rfl: 0    ibuprofen (ADVIL,MOTRIN) 600 MG tablet, Take 1 tablet by mouth Every 6 (Six) Hours As Needed for Mild Pain., Disp: 20 tablet, Rfl: 0    ipratropium-albuterol (COMBIVENT RESPIMAT)  MCG/ACT inhaler, Inhale 1 puff by mouth 4 (Four) Times a Day As Needed for Wheezing., Disp: 4 g, Rfl: 0    ipratropium-albuterol (DUO-NEB) 0.5-2.5 mg/3 ml nebulizer, Inhale the contents of 3 mL vial nebulizer every 6 hours as needed., Disp: 360 mL, Rfl: 3    Lancets (OneTouch Delica Plus [id number]) misc, Use as instructed 2 times daily and as needed, Disp: 100 each, Rfl: 3    levocetirizine (XYZAL) 5 MG tablet, Take 1 tablet by mouth in the evening Once a day 30 days, Disp: 30 tablet, Rfl: 11    lisinopril (PRINIVIL,ZESTRIL) 30 MG tablet, Take 1 tablet by mouth Daily for blood pressure., Disp: 90 tablet, Rfl: 1    loratadine (CLARITIN) 10 MG tablet, Take 1 tablet by mouth Daily As Needed for Allergies., Disp: 90 tablet, Rfl: 2    miconazole (Micatin) 2 % cream, Apply topically to the appropriate area as directed 2 (Two) Times a Day To feet., Disp: 30 g, Rfl: 0    mirtazapine (REMERON) 30 MG tablet, Take 1 & 1/2  tablets by mouth Every Night., Disp: 45 tablet, Rfl: 5    ondansetron (Zofran) 4 MG tablet, Take 1 tablet by mouth Every 8 (Eight) Hours As Needed for Nausea., Disp: 20 tablet, Rfl: 0    rosuvastatin (Crestor) 40 MG tablet, Take 1 tablet by mouth Daily., Disp: 30 tablet, Rfl: 3    senna  "(Senokot) 8.6 MG tablet, Take 2 tablets by mouth 2 (Two) Times a Day., Disp: 120 tablet, Rfl: 0    sodium chloride 0.65 % nasal spray, Use 1-3 sprays in each nostril Three times a day as needed 30 days, Disp: 44 mL, Rfl: 11    tamsulosin (FLOMAX) 0.4 MG capsule 24 hr capsule, Take 1 capsule by mouth Daily., Disp: 90 capsule, Rfl: 3    tamsulosin (Flomax) 0.4 MG capsule 24 hr capsule, Take 1 capsule by mouth Daily., Disp: 30 capsule, Rfl: 3    temazepam (Restoril) 15 MG capsule, Take 1 capsule by mouth At Night As Needed for Sleep., Disp: 30 capsule, Rfl: 1    TiZANidine (Zanaflex) 4 MG capsule, Take 1 capsule by mouth 3 (Three) Times a Day., Disp: 30 capsule, Rfl: 5    azithromycin (Zithromax Z-Clark) 250 MG tablet, Take 2 tablets by mouth on day 1, then 1 tablet daily on days 2-5 (Patient not taking: Reported on 10/29/2024), Disp: 6 tablet, Rfl: 0    methylPREDNISolone (MEDROL) 4 MG dose pack, TAKE BY MOUTH AS DIRECTED ON PACKAGE., Disp: 21 tablet, Rfl: 0    montelukast (SINGULAIR) 10 MG tablet, Take 1 tablet by mouth., Disp: , Rfl:     multivitamin with minerals tablet tablet, Take 1 tablet by mouth Daily., Disp: 30 tablet, Rfl: 12  No current facility-administered medications for this visit.    Medicines reviewed by Melinda Case, PharmD on 10/29/2024 at  2:58 PM    Patient was not cooperative when reviewing medication list. Kept repeating \" nothing on my list has changed.\" This was the firs time patient has been seen by the [facility] clinic.    Drug Interactions  None with repatha    Adherence and Self-Administration  Adherence related to the patient's specialty therapy was discussed with the patient. The Adherence segment of this outreach has been reviewed and updated.     Is there a concern with patient's ability to self administer the medication correctly and without issue?: No  Were any potential barriers to adherence identified during the initial assessment or patient education?: No  Are there any concerns " regarding the patient's understanding of the importance of medication adherence?: No  Methods for Supporting Patient Adherence and/or Self-Administration: patient refused to administer injection himself. He was informed he will need to come to clinic every 2 weeks for injection and he said that will be fine    Open Medication Therapy Problems  No medication therapy recommendations to display    Goals of Therapy  Goals related to the patient's specialty therapy were discussed with the patient. The Patient Goals segment of this outreach has been reviewed and updated.   Goals Addressed Today        Specialty Pharmacy General Goal      LDL reduction              Medication Assessment & Plan  Medication Therapy Changes: Patient started today on repatha 140 mg SC every 2 weeks.   Injection training and medication education provided.   I administered the medication into the back of his left arm  Patient will not give himself a shot and will require coming in to [facility] clinic every 2 weeks to receive injection  Welcome information and patient satisfaction survey to be sent by specialty pharmacy team with patient's initial fill.  Related Plans, Therapy Recommendations, or Therapy Problems to Be Addressed: none  Patient will need lipid panel in 6 weeks, order placed.   Patient will continue regular follow-up with cardiology.   Patient will follow up in clinic for next injection. Care Coordinator to set up future refill outreaches, coordinate prescription delivery, and escalate clinical questions to pharmacist.  Pharmacist to perform regular assessments no more than (6) months from the previous assessment. Will follow-up in 6 months, or sooner if needed.    Initial Education Provided for Specialty Medication  The patient has been provided with the following education and any applicable administration techniques (i.e. self-injection) have been demonstrated for the therapies indicated. All questions and concerns have been addressed  prior to the patient receiving the medication, and the patient has verbalized comprehension of the education and any materials provided. Additional patient education shall be provided and documented upon request by the patient, provider, or payer.      Initial Education Provided for Aleksander Cervantesatha is used to lower LDL, or bad cholesterol, to help reduce your chance of a heart attack or stroke.  You should give your injection once every 14 days days.  Your doctor will likely keep you on this medication indefinitely as long as it is working for you and not causing any adverse effects.      This medication should be kept in the refrigerator until you are ready to use it.  Once removed from the refrigerator, it is good at room temperature for 30 days.  Do not leave in your car or expose to extreme heat.  Do not shake or freeze.  Dispose the used syringe/device in a sharps container.     This injection is a subcutaneous injection, which means just under the skin.  It is important to choose an area of your skin that is not tender, bruised, cut or has scars or stretch marks.  You can inject into your abdomen (except for 2 inches around your navel), your thigh or your upper arm.  Do not administer with other drugs.   Rotate injection sites each time you give the injection. Wash your hands prior to giving yourself an injection and use an alcohol wipe to clean the area of the injection and allow to dry prior to injecting.      If you miss a dose, take it as soon as you remember and resume the original schedule if it is within 7 days from the missed dose.  If an every 2 week dose is not administered within 7 days, wait until the next dose on the original schedule.  If a once-monthly dose is not administered within 7 days, administer the dose and start a new schedule based on this date.     Be sure to let your doctor or pharmacist know of any medication changes, including OTC and herbal supplements.     Adverse  Effects  Reviewed with patient and education on management provided.     Sore Throat  Injection site pain  Itching or irritation   Flu-like symptoms  Signs of an allergic reaction     Immunizations  While there are no immunizations that you need to get specifically because you are on this drug, it is important to keep up with your recommended routine vaccinations.     The patient has been provided with the following education and any applicable administration techniques (i.e. self-injection) have been demonstrated for the therapies indicated. All questions and concerns have been addressed prior to the patient receiving the medication, and the patient has verbalized understanding of the education and any materials provided.  Additional patient education shall be provided and documented upon request by the patient, provider or payer.        Attestation  Therapeutic appropriateness: Appropriate   I attest the patient was actively involved in and has agreed to the above plan of care. If the prescribed therapy is at any point deemed not appropriate based on the current or future assessments, a consultation will be initiated with the patient's specialty care provider to determine the best course of action. The revised plan of therapy will be documented along with any required assessments and/or additional patient education provided.     Melinda Case, PharmD  10/29/2024  14:59 EDT

## 2024-10-30 ENCOUNTER — PATIENT OUTREACH (OUTPATIENT)
Dept: CASE MANAGEMENT | Facility: OTHER | Age: 54
End: 2024-10-30
Payer: COMMERCIAL

## 2024-10-30 NOTE — OUTREACH NOTE
AMBULATORY CASE MANAGEMENT NOTE    Names and Relationships of Patient/Support Persons: Contact: Jaquan Mckay; Relationship: Self -             Patient Name: Jaquan Mckay   Encounter Date 10/30/2024      Program: HRCM Encounter Provider: LUX Samano Active  Type of Outreach:  Telephonic     PURPOSE OF OUTREACH: ACO/HRCM follow-up for ED visit      Patient presented at [facility] Emergency Department on 10/27/24 with complaint of ankle injury.  Patient was treated and discharged to home to follow as outpatient. Clinical impression noted as sprain of right ankle, unspecified ligament, initial encounter.  Per review of clinical notes no acute fracture or dislocation was resulted on imaging.  After Visit Summary education topics include Ankle Sprain (Adult).     Medication changes noted at discharge include:    Ibuprofen 600 mg Oral Every 6 Hours PRN    Recommended follow up:     Schedule an appointment with Tiera Valenzuela APRN (Family Medicine); As needed  Go to [facility] EMERGENCY DEPARTMENT (Emergency Medicine); If symptoms worsen    -----------  Patient Outreach    An initial outreach was conducted. The patient was provided with introduction and purpose of the call. Patient reported continued swelling, bruising and discomfort in the injured ankle.  Patient reported a pre-existing relationship with podiatry services.  He has an appointment scheduled with [facility] on 11/07/24.  Date and time of appointment reviewed.  Patient denied other unmet needs, questions, and concerns for RN-MILLA to address at this time.  Patient is encouraged to call if care coordination needs present.        Future Appointments         Provider Department Center    11/12/2024 1:45 PM COR [facility] DSM CL[facility]IC [facility] DSM [facility]IC [facility]    11/19/2024 12:15 PM Tiera Valenzuela APRN [facility] FAMILY MEDICINE [facility]    11/22/2024 2:00 PM  Cheng-Akwa, Griselda, APRN [facility] GASTROENTEROLOGY & UROLOGY [facility]    1/7/2025 1:30 PM Virginie Garcia APRN [facility] CARDIOLOGY [facility] Documentation - Orthopedic    Unresolved/Worsening Symptoms, taught by Maricruz Preston, RN at 10/30/2024  1:30 PM.  Learner: Patient  Readiness: Acceptance  Method: Explanation  Response: Verbalizes Understanding    Provider Follow-Up, taught by Maricruz Preston RN at 10/30/2024  1:30 PM.  Learner: Patient  Readiness: Acceptance  Method: Explanation  Response: Verbalizes Understanding          Maricruz MCCOY RN-[facility]  Ambulatory Case Management  [phone]

## 2024-11-12 ENCOUNTER — DISEASE STATE MANAGEMENT VISIT (OUTPATIENT)
Dept: PHARMACY | Facility: HOSPITAL | Age: 54
End: 2024-11-12
Payer: COMMERCIAL

## 2024-11-12 NOTE — PROGRESS NOTES
Medication Management Clinic/ Specialty Pharmacy Patient Management Program  Lipid Management Program - PCSK9i follow up Assessment     Jaquan Mckay is a 53 y.o. male referred by their provider, Tiera Valenzuela, to the Hyperlipidemia Patient Management program offered by [facility] Specialty Pharmacy for Lipid Management.  Jaquan Mckay is  treated for ASCVD and hyperlipidemia, and currently takes crestor 40 mg.  In the past, Pt has tried lipitor 40 mg, zocor 10 mg, pravastatin 80 mg and is not statin intolerant. The patient denies any allergies to latex.        A follow-up outreach was conducted, including assessment of continued therapy appropriateness, medication adherence, and side effect incidence and management for Repatha. Patient comes to clinic for injections and has not missed any doses.  Repatha was started on 10/29/24, and patient reports tolerating the medication well with no side effects.    Patient reports he will be having ankle surgery on December 4th. He said he will have someone bring him in to appointments since he will not be able to drive for a period of time (right ankle surgery).    Changes to Insurance Coverage or Financial Support  [employer] [place] (no change)    Relevant Past Medical History and Comorbidities  Relevant medical history and concomitant health conditions were discussed with the patient. The patient's chart has been reviewed for relevant past medical history and comorbid health conditions and updated as necessary.   Past Medical History:   Diagnosis Date    Allergic     Anxiety     Arthritis     Asthma     Body piercing     REPORTS CYLICONE IN EARS    Clotting disorder 2004    had a knee surgery    Coronary artery disease     Depression     DVT (deep venous thrombosis)     RIGHT RIGHT KNEE AFTER SURGERY YEARS AGO IN 2001 OR 2004    Elevated cholesterol     Gastric ulcer     GERD (gastroesophageal reflux disease)     H/O migraine      "Headache     Heart attack     REPORTS \"LIGHT HEART ATTACK A LONG TIME AGO\"  \"EARLY 90'S\"    History of seizures     REPORTS LAST EPISODE WAS AROUND 1995.    Hostility     Hyperlipidemia     Hypertension     Knee pain, acute     Left    Low back pain     Lyme disease     Migraine     MRSA (methicillin resistant Staphylococcus aureus)     REPORTS LAST TESTED + 2004. WAS TREATED HE REPORTS.  RIGHT ARM, RIGHT KNEE.    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     Obesity     Poor historian     [place] spotted fever     Seizures     Sleep apnea     Tattoo     Wears glasses      Social History     Socioeconomic History    Marital status:      Spouse name: Becca    Number of children: 2    Years of education: 12   Tobacco Use    Smoking status: Every Day     Current packs/day: 1.00     Average packs/day: 1 pack/day for 17.7 years (17.7 ttl pk-yrs)     Types: Cigars, Cigarettes     Start date: 4/11/2022     Passive exposure: Current    Smokeless tobacco: Never   Vaping Use    Vaping status: Never Used   Substance and Sexual Activity    Alcohol use: No    Drug use: No    Sexual activity: Defer     Partners: Female     Birth control/protection: None          Hospitalizations and Urgent Care Since Last Assessment  ED Visits, Admissions, or Hospitalizations: none  Urgent Office Visits: none    Allergies  Known allergies and reactions were discussed with the patient. The patient's chart has been reviewed for allergy information and updated as necessary.   Allergies   Allergen Reactions    Ciprofloxacin Anaphylaxis and Hives    Miralax [Polyethylene Glycol] Itching and Rash    Mobic [Meloxicam] Other (See Comments)     Pt states, \"It make my feet and hands go numb and I can't hardly walk.\"     Paxil [Paroxetine Hcl] Shortness Of Breath     Chest pain     Peanut-Containing Drug Products Anaphylaxis    Penicillins Anaphylaxis    Pristiq [Desvenlafaxine Succinate Er] Dizziness    Sulfa Antibiotics Anaphylaxis, Itching and Rash    " Doxycycline Hives    Fish-Derived Products Hives    Isosorbide Nitrate Rash     Rash, hives, had to use inhaler.     Lyrica [Pregabalin] Hives    Movantik [Naloxegol] Rash    Seroquel [Quetiapine] Hives and Rash    Trulance [Plecanatide] Hives    Buspar [Buspirone] Rash    Clarithromycin Rash    Clindamycin/Lincomycin Rash    Codeine Rash    Contrast Dye (Echo Or Unknown Ct/Mr) Itching and Rash    Diltiazem Rash    Flomax [Tamsulosin] Hives    Gabapentin Rash    Iodinated Contrast Media Itching and Rash    Keflex [Cephalexin] Rash    Levocetirizine Rash    Linzess [Linaclotide] Rash    Metoprolol Rash    Prednisone Rash and Other (See Comments)     Face, feet, and legs go completely numb per patient    Robitussin Cough+ Chest Max St [Dextromethorphan-Guaifenesin] Itching    Shrimp (Diagnostic) Rash    Spironolactone Rash    Viibryd [Vilazodone Hcl] Itching and Rash    Zoloft [Sertraline Hcl] Hives and Itching          Relevant Laboratory Values  Relevant laboratory values were discussed with the patient. The following specialty medication dose adjustment(s) are recommended: none    Lab Results   Component Value Date    GLUCOSE 128 (H) 10/17/2024    CALCIUM 8.9 10/17/2024     10/17/2024    K 3.9 10/17/2024    CO2 24.0 10/17/2024     10/17/2024    BUN 12 10/17/2024    CREATININE 1.12 10/17/2024    EGFRIFAFRI  08/26/2016      Comment:      <15 Indicative of kidney failure.    EGFRIFNONA 66 02/02/2022    BCR 10.7 10/17/2024    ANIONGAP 10.0 10/17/2024     Lab Results   Component Value Date    CHOL 215 (H) 10/17/2024    CHLPL 232 (H) 04/05/2016    TRIG 98 10/17/2024    HDL 66 (H) 10/17/2024     (H) 10/17/2024       Current Medication List  This medication list has been reviewed with the patient and evaluated for any interactions or necessary modifications/recommendations, and updated to include all prescription medications, OTC medications, and supplements the patient is currently taking.  This list  reflects what is contained in the patient's profile, which has also been marked as reviewed to communicate to other providers it is the most up to date version of the patient's current medication therapy.     Current Outpatient Medications:     acetaminophen (TYLENOL) 500 MG tablet, Take 1 tablet by mouth Every 6 (Six) Hours As Needed for Mild Pain., Disp: 30 tablet, Rfl: 2    albuterol sulfate  (90 Base) MCG/ACT inhaler, Inhale 2 puffs by mouth every 4 hours as needed., Disp: 18 g, Rfl: 3    Alcohol Swabs (Alcohol Wipes) 70 % pads, 1 each 2 (Two) Times a Day., Disp: 100 each, Rfl: 5    aspirin (Aspirin EC Adult Low Dose) 81 MG EC tablet, Take 1 tablet by mouth Daily., Disp: 30 tablet, Rfl: 3    azelastine (ASTELIN) 0.1 % nasal spray, , Disp: , Rfl:     azithromycin (Zithromax Z-Clark) 250 MG tablet, Take 2 tablets by mouth on day 1, then 1 tablet daily on days 2-5 (Patient not taking: Reported on 10/29/2024), Disp: 6 tablet, Rfl: 0    buPROPion (WELLBUTRIN) 75 MG tablet, Take 1 tablet by mouth Daily., Disp: 30 tablet, Rfl: 0    calcium polycarbophil (FiberCon) 625 MG tablet, Take 1 tablet by mouth 2 (Two) Times a Day., Disp: 60 tablet, Rfl: 3    clobetasol (TEMOVATE) 0.05 % ointment, Apply 1 application topically to the appropriate area as directed 2 (Two) Times a Day., Disp: 60 g, Rfl: 2    colestipol (Colestid) 5 g packet, Take 1 packet mixed in at least 90ml of any beverage by mouth Daily., Disp: 30 each, Rfl: 2    dexlansoprazole (Dexilant) 60 MG capsule, Take 1 capsule by mouth 2 (Two) Times a Day., Disp: 60 capsule, Rfl: 13    dicyclomine (BENTYL) 10 MG capsule, Take 1 capsule by mouth 4 (Four) Times a Day Before Meals & at Bedtime., Disp: 30 capsule, Rfl: 0    Dilantin 100 MG capsule, Take 2 capsules by mouth 2 (Two) Times a Day., Disp: 120 capsule, Rfl: 2    diphenhydrAMINE (Benadryl Allergy) 25 MG tablet, Take 1-2 tablets by mouth Every 8 (Eight) Hours As Needed for Itching (or rash)., Disp: 120  tablet, Rfl: 2    Elastic Bandages & Supports (ACE Ankle Brace) misc, 1 each Daily., Disp: 1 each, Rfl: 0    EPINEPHrine (EPIPEN) 0.3 MG/0.3ML solution auto-injector injection, Inject Intramuscularly into lateral thigh as needed for treatment of anaphylaxis, then go to the ER or call 911., Disp: 2 each, Rfl: 2    ergocalciferol (ERGOCALCIFEROL) 1.25 MG (12576 UT) capsule, Take 1 capsule by mouth 1 (One) Time Per Week., Disp: 4 capsule, Rfl: 3    Evolocumab (REPATHA) solution auto-injector SureClick injection, Inject 1 mL under the skin into the appropriate area as directed Every 14 (Fourteen) Days., Disp: 2 mL, Rfl: 5    fluticasone (FLONASE) 50 MCG/ACT nasal spray, Use 1 spray in each nostril Twice a day for 30 days, Disp: 16 g, Rfl: 11    fluticasone (Flovent HFA) 110 MCG/ACT inhaler, Inhale 1 puff by mouth 2 (Two) Times a Day., Disp: 12 g, Rfl: 5    fluticasone (FLOVENT HFA) 44 MCG/ACT inhaler, Inhale 1 puff by mouth twice a day., Disp: 10.6 g, Rfl: 5    glucose blood (OneTouch Ultra) test strip, Use as instructed 2 times daily and as needed, Disp: 100 each, Rfl: 3    guaiFENesin (Mucinex) 600 MG 12 hr tablet, Take 2 tablets by mouth 2 (Two) Times a Day., Disp: 20 tablet, Rfl: 0    ibuprofen (ADVIL,MOTRIN) 600 MG tablet, Take 1 tablet by mouth Every 6 (Six) Hours As Needed for Mild Pain., Disp: 20 tablet, Rfl: 0    ipratropium-albuterol (COMBIVENT RESPIMAT)  MCG/ACT inhaler, Inhale 1 puff by mouth 4 (Four) Times a Day As Needed for Wheezing., Disp: 4 g, Rfl: 0    ipratropium-albuterol (DUO-NEB) 0.5-2.5 mg/3 ml nebulizer, Inhale the contents of 3 mL vial nebulizer every 6 hours as needed., Disp: 360 mL, Rfl: 3    Lancets (OneTouch Delica Plus [id number]) misc, Use as instructed 2 times daily and as needed, Disp: 100 each, Rfl: 3    levocetirizine (XYZAL) 5 MG tablet, Take 1 tablet by mouth in the evening Once a day 30 days, Disp: 30 tablet, Rfl: 11    lisinopril (PRINIVIL,ZESTRIL) 30 MG tablet, Take 1 tablet  by mouth Daily for blood pressure., Disp: 90 tablet, Rfl: 1    loratadine (CLARITIN) 10 MG tablet, Take 1 tablet by mouth Daily As Needed for Allergies., Disp: 90 tablet, Rfl: 2    methylPREDNISolone (MEDROL) 4 MG dose pack, TAKE BY MOUTH AS DIRECTED ON PACKAGE., Disp: 21 tablet, Rfl: 0    miconazole (Micatin) 2 % cream, Apply topically to the appropriate area as directed 2 (Two) Times a Day To feet., Disp: 30 g, Rfl: 0    mirtazapine (REMERON) 30 MG tablet, Take 1 & 1/2  tablets by mouth Every Night., Disp: 45 tablet, Rfl: 5    montelukast (SINGULAIR) 10 MG tablet, Take 1 tablet by mouth., Disp: , Rfl:     multivitamin with minerals tablet tablet, Take 1 tablet by mouth Daily., Disp: 30 tablet, Rfl: 12    ondansetron (Zofran) 4 MG tablet, Take 1 tablet by mouth Every 8 (Eight) Hours As Needed for Nausea., Disp: 20 tablet, Rfl: 0    rosuvastatin (Crestor) 40 MG tablet, Take 1 tablet by mouth Daily., Disp: 30 tablet, Rfl: 3    senna (Senokot) 8.6 MG tablet, Take 2 tablets by mouth 2 (Two) Times a Day., Disp: 120 tablet, Rfl: 0    sodium chloride 0.65 % nasal spray, Use 1-3 sprays in each nostril Three times a day as needed 30 days, Disp: 44 mL, Rfl: 11    tamsulosin (FLOMAX) 0.4 MG capsule 24 hr capsule, Take 1 capsule by mouth Daily., Disp: 90 capsule, Rfl: 3    tamsulosin (Flomax) 0.4 MG capsule 24 hr capsule, Take 1 capsule by mouth Daily., Disp: 30 capsule, Rfl: 3    temazepam (Restoril) 15 MG capsule, Take 1 capsule by mouth At Night As Needed for Sleep., Disp: 30 capsule, Rfl: 1    TiZANidine (Zanaflex) 4 MG capsule, Take 1 capsule by mouth 3 (Three) Times a Day., Disp: 30 capsule, Rfl: 5  No current facility-administered medications for this visit.         Drug Interactions  None with repatha    Adverse Drug Reactions        Plan for ADR Management: n/a    Adherence, Self-Administration, and Current Therapy Problems  Adherence related to the patient's specialty therapy was discussed with the patient. The  Adherence segment of this outreach has been reviewed and updated.          Additional Barriers to Patient Self-Administration: patient refuses to administer himself  Methods for Supporting Patient Self-Administration: patient comes to [facility] clinic every 2 weeks for injections    Open Medication Therapy Problems  No medication therapy recommendations to display    Goals of Therapy  Goals related to the patient's specialty therapy were discussed with the patient. The Patient Goals segment of this outreach has been reviewed and updated.   Goals Addressed Today    None     LDL was 136 mg/dl on 3/20/24 and has improved to 132 mg/ml on 10/17/29. This lab work was completed prior to starting repatha therapy    Quality of Life Assessment   Quality of Life related to the patient's enrollment in the patient management program and services provided was discussed with the patient. The QOL segment of this outreach has been reviewed and updated.       Reassessment Plan & Follow-Up  1. Medication Therapy Changes: no changes reported in medications  2. Related Plans, Therapy Recommendations, or Issues to Be Addressed: none     3. Pharmacist to perform regular assessments no more than (6) months from the previous assessment.  Patient will continue regular follow-up with referring provider.   4. Care Coordinator to set up future refill outreaches, coordinate prescription delivery, and escalate clinical questions to pharmacist.  5. I administered injection in to the back of his RIGHT arm- Lot:[id number], exp: 12/31/2026    Attestation      I attest the patient was actively involved in and has agreed to the above plan of care.  If the prescribed therapy is at any point deemed not appropriate based on the current or future assessments, a consultation will be initiated with the patient's specialty care provider to determine the best course of action. The revised plan of therapy will be documented along with any required assessments and/or  additional patient education provided.     Patient to follow up for injections in Clinic every 2 weeks    Melinda Case, PharmD  11/12/2024  13:52 EST

## 2024-11-15 DIAGNOSIS — F41.8 DEPRESSION WITH ANXIETY: ICD-10-CM

## 2024-11-17 RX ORDER — BUPROPION HYDROCHLORIDE 75 MG/1
75 TABLET ORAL DAILY
Qty: 30 TABLET | Refills: 5 | Status: SHIPPED | OUTPATIENT
Start: 2024-11-17

## 2024-11-20 ENCOUNTER — SPECIALTY PHARMACY (OUTPATIENT)
Dept: PHARMACY | Facility: HOSPITAL | Age: 54
End: 2024-11-20
Payer: COMMERCIAL

## 2024-11-20 ENCOUNTER — TELEPHONE (OUTPATIENT)
Dept: FAMILY MEDICINE CLINIC | Facility: CLINIC | Age: 54
End: 2024-11-20
Payer: COMMERCIAL

## 2024-11-20 NOTE — PROGRESS NOTES
Specialty Pharmacy Refill Coordination Note     Jaquan is a 53 y.o. male contacted today regarding refills of  Repatha SureClick specialty medication(s).    Reviewed and verified with patient:       Specialty medication(s) and dose(s) confirmed: yes    Refill Questions      Flowsheet Row Most Recent Value   Changes to allergies? No   Changes to medications? No   New conditions or infections since last clinic visit No   Unplanned office visit, urgent care, ED, or hospital admission in the last 4 weeks  No   How does patient/caregiver feel medication is working? Good   Financial problems or insurance changes  No   Since the previous refill, were any specialty medication doses or scheduled injections missed or delayed?  No   Does this patient require a clinical escalation to a pharmacist? No            Delivery Questions      Flowsheet Row Most Recent Value   Delivery method  at Pharmacy  [gets in clinic]   Delivery address Prescription   Medication(s) being filled and delivered Evolocumab (REPATHA)   Copay verified? Yes   Copay amount $0   Copay form of payment No copayment ($0)   Signature Required No                   Follow-up: 28 day(s)     Ruth Yip, Pharmacy Technician  Specialty Pharmacy Technician

## 2024-11-22 ENCOUNTER — OFFICE VISIT (OUTPATIENT)
Dept: UROLOGY | Facility: CLINIC | Age: 54
End: 2024-11-22
Payer: COMMERCIAL

## 2024-11-22 ENCOUNTER — HOSPITAL ENCOUNTER (OUTPATIENT)
Dept: GENERAL RADIOLOGY | Facility: HOSPITAL | Age: 54
Discharge: HOME | End: 2024-11-22
Payer: COMMERCIAL

## 2024-11-22 VITALS
HEIGHT: 67 IN | WEIGHT: 244 LBS | DIASTOLIC BLOOD PRESSURE: 84 MMHG | SYSTOLIC BLOOD PRESSURE: 133 MMHG | BODY MASS INDEX: 38.3 KG/M2 | HEART RATE: 96 BPM

## 2024-11-22 DIAGNOSIS — Z87.442 HISTORY OF KIDNEY STONES: ICD-10-CM

## 2024-11-22 DIAGNOSIS — N40.1 BENIGN PROSTATIC HYPERPLASIA (BPH) WITH STRAINING ON URINATION: ICD-10-CM

## 2024-11-22 DIAGNOSIS — N40.0 BPH WITHOUT OBSTRUCTION/LOWER URINARY TRACT SYMPTOMS: ICD-10-CM

## 2024-11-22 DIAGNOSIS — R10.9 BILATERAL FLANK PAIN: ICD-10-CM

## 2024-11-22 DIAGNOSIS — R35.0 FREQUENCY OF URINATION: ICD-10-CM

## 2024-11-22 DIAGNOSIS — R39.16 BENIGN PROSTATIC HYPERPLASIA (BPH) WITH STRAINING ON URINATION: ICD-10-CM

## 2024-11-22 DIAGNOSIS — R35.0 FREQUENCY OF MICTURITION: Primary | ICD-10-CM

## 2024-11-22 DIAGNOSIS — R30.0 BURNING WITH URINATION: ICD-10-CM

## 2024-11-22 LAB
BILIRUB BLD-MCNC: NEGATIVE MG/DL
CLARITY, POC: CLEAR
COLOR UR: YELLOW
EXPIRATION DATE: ABNORMAL
GLUCOSE UR STRIP-MCNC: NEGATIVE MG/DL
KETONES UR QL: NEGATIVE
LEUKOCYTE EST, POC: NEGATIVE
Lab: ABNORMAL
NITRITE UR-MCNC: NEGATIVE MG/ML
PH UR: 6 [PH] (ref 5–8)
PROT UR STRIP-MCNC: NEGATIVE MG/DL
RBC # UR STRIP: ABNORMAL /UL
SP GR UR: 1 (ref 1–1.03)
UROBILINOGEN UR QL: NORMAL

## 2024-11-22 PROCEDURE — 87086 URINE CULTURE/COLONY COUNT: CPT | Performed by: NURSE PRACTITIONER

## 2024-11-22 PROCEDURE — 74018 RADEX ABDOMEN 1 VIEW: CPT

## 2024-11-22 PROCEDURE — 84154 ASSAY OF PSA FREE: CPT | Performed by: NURSE PRACTITIONER

## 2024-11-22 PROCEDURE — 84153 ASSAY OF PSA TOTAL: CPT | Performed by: NURSE PRACTITIONER

## 2024-11-22 RX ORDER — TAMSULOSIN HYDROCHLORIDE 0.4 MG/1
1 CAPSULE ORAL DAILY
Qty: 90 CAPSULE | Refills: 3 | Status: SHIPPED | OUTPATIENT
Start: 2024-11-22

## 2024-11-22 NOTE — PROGRESS NOTES
Chief Complaint  Frequency of urination and Benign Prostatic Hypertrophy (YEARLY FOLLOW UP BPH W, LUTS/FREQUENCY/FLANK PAIN)    Subjective        Jaquan Mckay presents to [facility] for BPH WITH LUTS/FLANK PAIN  History of Present Illness    Mr. Jaquan Mckay is a pleasant 53-year-old male who returns to clinic today for evaluation. This is a yearly follow-up with prior complaints of BPH with lower urinary tract symptoms, most bothersome of which was urine frequency, urgency, and constant feeling of incomplete bladder emptying. Patient did have bilateral flank pain with a significant history of kidney stones-RESOLVED.    ON CLINIC EVALUATION TODAY,  He is in no apparent discomfort and denies any bothersome urinary symptoms.  He has been on tamsulosin and tolerates medications well.  Nuys any dysuria or burning with urination today, he denies pelvic pressure suprapubic discomfort.  He however reports persistent bilateral flank pain that has been ongoing worsening this last few weeks consistent to his prior kidney stones.  We reviewed his last KUB from 6 months prior which was completely negative for kidney stones.  He would like imaging repeated today.      INITIALLY, THE Patient haD been started on tamsulosin 0.4 mg with significant improvement in his symptoms. However, he does have a compliance issue with medications reports taking intermittently.  No patient reports he has been consistent and denies any bothersome urinary symptoms.  He deferred GERARDO today.  His most recent PSA is at 0.5 and that was completed on 11/16 of 2022, his PSA was a 1.0  completed on 07/01/2021. Patient's PSA was 0.8 four years prior.     OVERALL, The patient states he is NOT doing well.  He is post left foot/leg surgery and is pending evaluation for his right foot which currently is in both due to bone spurs.  BPH wise he denies any discomfort.  He denies any perineal pain or constant feeling of  sitting on eggshells consistent with prostatitis. He confirms urinary frequency and urgency but that is due to drinking exhibiting cola products, and also 14 water bottles daily he states.  He reports nocturia.  He urinates approximately 8 times a day. He gets up approximately 3 times at night to urinate and this is due to his late night p.o. fluid intake. He takes his medication around 9:00 PM or 10:00 PM. He takes Flomax in the morning.     The patient states he has not been getting much sleep. He has been having anxiety attacks. He had left ankle surgery 5 months ago. He had 70 staples and 30 stitches and recently had a revision. He just RE started physical therapy again. He has 9 more weeks of therapy left. He has fluid on his left and now right ankle.     FINDINGS:XR Abdomen KUB  06/24/24  2 views of the abdomen-No evidence of bowel obstruction, No acute bony abnormality.   IMPRESSION:No evidence of bowel obstruction    There is sepsis PMHx as noted in chart    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    BMI 35.0-35.9,adult 08/15/2017    Chondromalacia, knee 09/12/2017    Achilles tendinitis of both lower extremities 01/08/2018    Muscle spasm of both lower legs 01/08/2018    Personal history of allergy to shellfish 05/03/2018    Sensation of cold in lower extremity 05/03/2018    Varicose vein of leg 05/03/2018    Heart palpitations 05/04/2018    Generalized anxiety disorder  07/31/2018    Chronic elbow pain, right 07/31/2018    Unstable angina 09/25/2018    ASCVD (arteriosclerotic cardiovascular disease) 01/29/2019    Other constipation 05/22/2019    Class 2 severe obesity due to excess calories with serious comorbidity and body mass index (BMI) of 36.0 to 36.9 in adult 07/16/2019    Bacteremia 09/25/2019    Therapeutic opioid-induced constipation (OIC) 05/21/2020    Rectal bleeding 05/21/2020    Bloating 05/21/2020    History of colon polyps 05/21/2020    Lateral epicondylitis of right elbow 07/15/2020    Psychophysiological insomnia 11/03/2020    Chronic rhinitis 11/03/2020    Vitamin D deficiency 02/08/2021    Renal calculus 07/07/2021    Chronic idiopathic constipation 07/27/2021    Diarrhea 07/27/2021    Bilateral flank pain 01/31/2022    BPH without obstruction/lower urinary tract symptoms 01/31/2022    Incomplete bladder emptying 01/31/2022    Frequency of urination 11/20/2023    Mixed stress and urge urinary incontinence 04/10/2024    History of kidney stones 11/22/2024    Benign prostatic hyperplasia (BPH) with straining on urination 11/22/2024     Resolved Ambulatory Problems     Diagnosis Date Noted    Arthritis 08/26/2016    Varicocele 12/07/2016    Obesity (BMI 30.0-34.9) 01/03/2017    Shortness of breath 06/26/2018    Chest pain 09/25/2018    Elevated prolactin level 03/12/2019    Generalized abdominal pain 05/21/2020     Past Medical History:   Diagnosis Date    Allergic     Asthma     Body piercing     Clotting disorder 2004    Coronary artery disease     Depression     DVT (deep venous thrombosis)     Elevated cholesterol     Gastric ulcer     GERD (gastroesophageal reflux disease)     H/O migraine     Headache     Heart attack     History of seizures     Hostility     Hypertension     Knee pain, acute     Low back pain     Lyme disease     Migraine     MRSA (methicillin resistant Staphylococcus aureus)     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     Obesity     Poor historian     Carl  "Mountain spotted fever     Sleep apnea     Tattoo     Wears glasses       Objective   Vital Signs:   /84   Pulse 96   Ht 170.2 cm (67.01\")   Wt 111 kg (244 lb)   BMI 38.21 kg/m²       ROS:   Review of Systems   Constitutional:  Positive for activity change, appetite change and unexpected weight gain. Negative for diaphoresis, fatigue, fever and unexpected weight loss.   HENT:  Negative for congestion, ear discharge, ear pain, nosebleeds, rhinorrhea, sinus pressure and sore throat.    Eyes:  Negative for blurred vision, double vision, photophobia, pain, redness and visual disturbance.   Respiratory:  Negative for apnea, cough, chest tightness, shortness of breath, wheezing and stridor.    Cardiovascular:  Negative for chest pain and palpitations.   Gastrointestinal:  Positive for abdominal distention and constipation. Negative for abdominal pain, diarrhea, nausea and vomiting.   Endocrine: Negative for polydipsia, polyphagia and polyuria.   Genitourinary:  Positive for difficulty urinating, dysuria, flank pain, frequency, nocturia and urgency. Negative for decreased urine volume, hematuria and urinary incontinence.   Musculoskeletal:  Positive for back pain and myalgias. Negative for arthralgias and joint swelling.   Skin:  Positive for dry skin and skin lesions. Negative for pallor, rash and wound.   Neurological:  Negative for dizziness, tremors, syncope, weakness, light-headedness, memory problem and confusion.   Psychiatric/Behavioral:  Positive for sleep disturbance and stress. Negative for behavioral problems.         Physical Exam  Constitutional:       General: He is in acute distress.      Appearance: He is well-developed. He is obese. He is ill-appearing.   HENT:      Head: Normocephalic and atraumatic.      Right Ear: External ear normal.      Left Ear: External ear normal.   Eyes:      General:         Right eye: No discharge.         Left eye: No discharge.      Conjunctiva/sclera: Conjunctivae " normal.      Pupils: Pupils are equal, round, and reactive to light.   Neck:      Thyroid: No thyromegaly.      Trachea: No tracheal deviation.   Cardiovascular:      Rate and Rhythm: Normal rate and regular rhythm.      Heart sounds: No murmur heard.     No friction rub.   Pulmonary:      Effort: Pulmonary effort is normal. No respiratory distress.      Breath sounds: Normal breath sounds. No stridor.   Abdominal:      General: Bowel sounds are normal. There is distension.      Palpations: Abdomen is soft.      Tenderness: There is abdominal tenderness. There is no guarding.   Genitourinary:     Penis: Normal and uncircumcised. No tenderness or discharge.       Testes: Normal.      Rectum: Normal. Guaiac result negative.      Comments: He reports dysuria, urine frequency, urgency, and burning with urination.  He also has nocturia but PVR is 0 mL.  Musculoskeletal:         General: Tenderness present. No deformity. Normal range of motion.      Cervical back: Normal range of motion and neck supple.   Skin:     General: Skin is warm and dry.      Capillary Refill: Capillary refill takes less than 2 seconds.      Coloration: Skin is pale.   Neurological:      Mental Status: He is alert and oriented to person, place, and time.      Cranial Nerves: No cranial nerve deficit.      Motor: Weakness present.      Coordination: Coordination normal.   Psychiatric:         Behavior: Behavior normal.         Thought Content: Thought content normal.         Judgment: Judgment normal.        Result Review :     UA          4/9/2024    16:33 5/26/2024    02:23 6/20/2024    10:35   Urinalysis   Specific Gravity, UA  1.023     Ketones, UA Negative  Negative  Negative    Blood, UA  Negative     Leukocytes, UA Negative  Negative  Negative    Nitrite, UA  Negative       Urine Culture          6/20/2024    10:23   Urine Culture   Urine Culture No growth      Assessment and Plan    Problem List Items Addressed This Visit           Gastrointestinal Abdominal     Bilateral flank pain    Relevant Orders    XR abdomen kub       Genitourinary and Reproductive     Burning with urination    Relevant Medications    tamsulosin (FLOMAX) 0.4 MG capsule 24 hr capsule    Other Relevant Orders    PSA Total+% Free (Serial)    BPH without obstruction/lower urinary tract symptoms    Relevant Medications    tamsulosin (FLOMAX) 0.4 MG capsule 24 hr capsule    Other Relevant Orders    PSA Total+% Free (Serial)    Frequency of urination    Relevant Medications    tamsulosin (FLOMAX) 0.4 MG capsule 24 hr capsule    History of kidney stones    Relevant Orders    XR abdomen kub    Benign prostatic hyperplasia (BPH) with straining on urination    Relevant Medications    tamsulosin (FLOMAX) 0.4 MG capsule 24 hr capsule    Other Relevant Orders    PSA Total+% Free (Serial)     Other Visit Diagnoses       Frequency of micturition    -  Primary    Relevant Medications    tamsulosin (FLOMAX) 0.4 MG capsule 24 hr capsule    Other Relevant Orders    POC Urinalysis Dipstick, Automated    Urine Culture - Urine, Urine, Random Void    PSA Total+% Free (Serial)                                        ASSESSMENT   BPH WITH LUTS/RIGHT>LEFT FLANK PAIN /HX OF KIDNEY STONES-RESOLVED/IBS-C  MR. PATRICIA SMITH IS A 54Y/O Pleasant patient with extensive polypharmacy, and significant past medical history, consistent with back pain, OAB/DI, BPH with LUTS, kidney stones, constipation, WHO returns to clinic today for evaluation.     This is his yearly follow up. Patient  Reports bothersome urinary symptoms with frequency urgency and dysuria that initially was becoming unbearable to him but has significantly improved since adherence to his daily therapy of Flomax.  He reports avoiding bladder irritants such as caffeine products, watching his bladder trichiasis with minimal flareup.  He denies dysuria or any burning with urination, denies any recent bouts of gross hematuria.      Nevertheless  patient is concerned about kidney stones and requesting imaging today despite negative KUB/CT 6 months prior.  Reports Lower back pain and abdominal pain, flank pain which she describes as colicky in nature radiating to his lower back.  He describes pain as intermittent but occasionally has gotten sharp with difficulty maintaining comfortable sitting/lying positions consistent just prior kidney stone.  His urinalysis in clinic today is complete negative for leukocyte esterase, it is negative for nitrites, it is negative for gross only showed trace microscopic hematuria.  IPSS SCORE IS 24, with a PVR of 0 mL, HIS most recent PSA is 0.5 completed on 11/16 or 2022, it was 0.1 in 2021.     BPH: AGAIN, WE Discussed the pathophysiology of BPH and obstruction. We discussed the static and dynamic effects of BPH as well as using 5 alpha reductase inhibitors versus alpha blockade.  We discussed the indications for transurethral surgery as well.  And/ or other therapeutic options available including all of the newer techniques.  He has no real symptomatology referable to his prostate other than moderate enlargement.  Rather than proceed with an expensive workup he doesn't need, at this time I am recommending he continue with an alpha blocker tamsulosin 0.4 mg capsule daily    Again, we discussed OAB/Detrusor instability which is an  irritative bladder symptomatology most likely related to factors such as intake of bladder irritants, postinfectious irritation, prolapse, with a very large differential diagnosis.  The mainstay of treatment has been tight cholinergics which basically caused the bladder to have decreased contractility.  We have discussed the side effects of these treatments including dry mouth, double vision, and increasing constipation.  He reports doing well  on Flomax when he takes it, encourage compliance.     KIDNEY STONES-RESOLVED                                          PLAN  We did resend his urine for  culture due to complaints of frequency urgency, dysuria, burning with urination.    I will call patient results if any positive bacterial growth.  Discussed antibiotic therapy if positive.    Rechecked  PSA today free and total, I will call him with results.    CONTINUE FLOMAX 0.4MG DAILY-refill sent    PYRIDIUM 200 MG PRN DYSURIA/BURNING WITH URINATION    Will repeat a KUB today secondary to complaints of bilateral flank pain-eval for kidney stone     Discussed to monitor his daily routine with prevention of flank pain by: At least Drinking 8 glasses of water per day, Limiting your alcohol consumption.  Having a healthy diet containing fruits, vegetables, and a lot of fluids, Practicing safe sex.  Also maintaining proper hygiene of HIS body as well as the environment.      We further discussed a kidney stone prevention diet to include increasing p.o. fluid intake, to at least 1 to 2 L of water daily.  He is to avoid caffeine products such as cola, coffee, and to avoid soft or soda drinks.      We also discussed the importance of decreasing his  sodium consumption as in  Fast foods, denny, salted nuts, canned foods, and smoked/cured foods.  He is also to decrease his oxalate consumption, as in spinach, Adrian greens, and Rhubarb.  Also important is to decrease protein intake, as in red meats, peanut butter, and also avoid nuts     He is to continue his daily bowel regiment as recommended by  GI    Continue all other medications as prescribed.     Follow-up in clinic as discussed, may return sooner if worsening.     Patient is agreeable plan of care.    Patient reports that he is not currently experiencing any symptoms of urinary incontinence.      RADIOLOGY (CT AND/OR KUB):    CT Abdomen and Pelvis: No results found for this or any previous visit.     CT Stone Protocol: No results found for this or any previous visit.     KUB: Results for orders placed during the hospital encounter of 11/16/22    XR Abdomen  KUB    Narrative  EXAM:  XR Abdomen, 1 View    EXAM DATE:  11/16/2022 1:32 PM    CLINICAL HISTORY:  kidney stone; N20.0-Calculus of kidney    TECHNIQUE:  Frontal supine view of the abdomen/pelvis.    COMPARISON:  05/18/2022    FINDINGS:  Gastrointestinal tract:  Unremarkable.  No dilation.  Bones/joints:  Unremarkable.    Impression  No renal or ureteral stones identified.    This report was finalized on 11/16/2022 1:55 PM by Dr. Raul Calixto MD.       LABS (3 MONTHS):    Office Visit on 10/16/2024   Component Date Value Ref Range Status    Glucose 10/17/2024 128 (H)  65 - 99 mg/dL Final    BUN 10/17/2024 12  6 - 20 mg/dL Final    Creatinine 10/17/2024 1.12  0.76 - 1.27 mg/dL Final    Sodium 10/17/2024 139  136 - 145 mmol/L Final    Potassium 10/17/2024 3.9  3.5 - 5.2 mmol/L Final    Chloride 10/17/2024 105  98 - 107 mmol/L Final    CO2 10/17/2024 24.0  22.0 - 29.0 mmol/L Final    Calcium 10/17/2024 8.9  8.6 - 10.5 mg/dL Final    Total Protein 10/17/2024 7.4  6.0 - 8.5 g/dL Final    Albumin 10/17/2024 4.1  3.5 - 5.2 g/dL Final    ALT (SGPT) 10/17/2024 25  1 - 41 U/L Final    AST (SGOT) 10/17/2024 26  1 - 40 U/L Final    Alkaline Phosphatase 10/17/2024 108  39 - 117 U/L Final    Total Bilirubin 10/17/2024 0.3  0.0 - 1.2 mg/dL Final    Globulin 10/17/2024 3.3  gm/dL Final    A/G Ratio 10/17/2024 1.2  g/dL Final    BUN/Creatinine Ratio 10/17/2024 10.7  7.0 - 25.0 Final    Anion Gap 10/17/2024 10.0  5.0 - 15.0 mmol/L Final    eGFR 10/17/2024 79.5  >60.0 mL/min/1.73 Final    Total Cholesterol 10/17/2024 215 (H)  0 - 200 mg/dL Final    Triglycerides 10/17/2024 98  0 - 150 mg/dL Final    HDL Cholesterol 10/17/2024 66 (H)  40 - 60 mg/dL Final    LDL Cholesterol  10/17/2024 132 (H)  0 - 100 mg/dL Final    VLDL Cholesterol 10/17/2024 17  5 - 40 mg/dL Final    LDL/HDL Ratio 10/17/2024 1.96   Final    TSH 10/17/2024 2.150  0.270 - 4.200 uIU/mL Final    Hemoglobin A1C 10/17/2024 5.40  4.80 - 5.60 % Final    Free T4 10/17/2024  0.94  0.92 - 1.68 ng/dL Final    Phenytoin Level 10/17/2024 14.0  10.0 - 20.0 mcg/mL Final    WBC 10/17/2024 6.94  3.40 - 10.80 10*3/mm3 Final    RBC 10/17/2024 4.86  4.14 - 5.80 10*6/mm3 Final    Hemoglobin 10/17/2024 15.8  13.0 - 17.7 g/dL Final    Hematocrit 10/17/2024 44.7  37.5 - 51.0 % Final    MCV 10/17/2024 92.0  79.0 - 97.0 fL Final    MCH 10/17/2024 32.5  26.6 - 33.0 pg Final    MCHC 10/17/2024 35.3  31.5 - 35.7 g/dL Final    RDW 10/17/2024 12.5  12.3 - 15.4 % Final    RDW-SD 10/17/2024 41.9  37.0 - 54.0 fl Final    MPV 10/17/2024 11.6  6.0 - 12.0 fL Final    Platelets 10/17/2024 190  140 - 450 10*3/mm3 Final    Neutrophil % 10/17/2024 60.4  42.7 - 76.0 % Final    Lymphocyte % 10/17/2024 26.4  19.6 - 45.3 % Final    Monocyte % 10/17/2024 11.4  5.0 - 12.0 % Final    Eosinophil % 10/17/2024 1.2  0.3 - 6.2 % Final    Basophil % 10/17/2024 0.3  0.0 - 1.5 % Final    Immature Grans % 10/17/2024 0.3  0.0 - 0.5 % Final    Neutrophils, Absolute 10/17/2024 4.20  1.70 - 7.00 10*3/mm3 Final    Lymphocytes, Absolute 10/17/2024 1.83  0.70 - 3.10 10*3/mm3 Final    Monocytes, Absolute 10/17/2024 0.79  0.10 - 0.90 10*3/mm3 Final    Eosinophils, Absolute 10/17/2024 0.08  0.00 - 0.40 10*3/mm3 Final    Basophils, Absolute 10/17/2024 0.02  0.00 - 0.20 10*3/mm3 Final    Immature Grans, Absolute 10/17/2024 0.02  0.00 - 0.05 10*3/mm3 Final    nRBC 10/17/2024 0.0  0.0 - 0.2 /100 WBC Final   Admission on 09/23/2024, Discharged on 09/23/2024   Component Date Value Ref Range Status    QT Interval 09/23/2024 314  ms Final    QTC Interval 09/23/2024 409  ms Final    Glucose 09/23/2024 107 (H)  65 - 99 mg/dL Final    BUN 09/23/2024 14  6 - 20 mg/dL Final    Creatinine 09/23/2024 1.10  0.76 - 1.27 mg/dL Final    Sodium 09/23/2024 136  136 - 145 mmol/L Final    Potassium 09/23/2024 3.6  3.5 - 5.2 mmol/L Final    Slight hemolysis detected by analyzer. Result may be falsely elevated.    Chloride 09/23/2024 101  98 - 107 mmol/L Final    CO2  09/23/2024 20.8 (L)  22.0 - 29.0 mmol/L Final    Calcium 09/23/2024 9.2  8.6 - 10.5 mg/dL Final    Total Protein 09/23/2024 8.0  6.0 - 8.5 g/dL Final    Albumin 09/23/2024 4.1  3.5 - 5.2 g/dL Final    ALT (SGPT) 09/23/2024 24  1 - 41 U/L Final    AST (SGOT) 09/23/2024 27  1 - 40 U/L Final    Alkaline Phosphatase 09/23/2024 99  39 - 117 U/L Final    Total Bilirubin 09/23/2024 0.4  0.0 - 1.2 mg/dL Final    Globulin 09/23/2024 3.9  gm/dL Final    A/G Ratio 09/23/2024 1.1  g/dL Final    BUN/Creatinine Ratio 09/23/2024 12.7  7.0 - 25.0 Final    Anion Gap 09/23/2024 14.2  5.0 - 15.0 mmol/L Final    eGFR 09/23/2024 81.3  >60.0 mL/min/1.73 Final    HS Troponin T 09/23/2024 7  <22 ng/L Final    Extra Tube 09/23/2024 Hold for add-ons.   Final    Auto resulted.    Extra Tube 09/23/2024 hold for add-on   Final    Auto resulted    Extra Tube 09/23/2024 Hold for add-ons.   Final    Auto resulted.    Extra Tube 09/23/2024 Hold for add-ons.   Final    Auto resulted    WBC 09/23/2024 7.74  3.40 - 10.80 10*3/mm3 Final    RBC 09/23/2024 5.05  4.14 - 5.80 10*6/mm3 Final    Hemoglobin 09/23/2024 16.0  13.0 - 17.7 g/dL Final    Hematocrit 09/23/2024 46.3  37.5 - 51.0 % Final    MCV 09/23/2024 91.7  79.0 - 97.0 fL Final    MCH 09/23/2024 31.7  26.6 - 33.0 pg Final    MCHC 09/23/2024 34.6  31.5 - 35.7 g/dL Final    RDW 09/23/2024 12.5  12.3 - 15.4 % Final    RDW-SD 09/23/2024 41.8  37.0 - 54.0 fl Final    MPV 09/23/2024 9.7  6.0 - 12.0 fL Final    Platelets 09/23/2024 204  140 - 450 10*3/mm3 Final    Neutrophil % 09/23/2024 58.4  42.7 - 76.0 % Final    Lymphocyte % 09/23/2024 29.7  19.6 - 45.3 % Final    Monocyte % 09/23/2024 10.6  5.0 - 12.0 % Final    Eosinophil % 09/23/2024 0.9  0.3 - 6.2 % Final    Basophil % 09/23/2024 0.3  0.0 - 1.5 % Final    Immature Grans % 09/23/2024 0.1  0.0 - 0.5 % Final    Neutrophils, Absolute 09/23/2024 4.52  1.70 - 7.00 10*3/mm3 Final    Lymphocytes, Absolute 09/23/2024 2.30  0.70 - 3.10 10*3/mm3 Final     Monocytes, Absolute 09/23/2024 0.82  0.10 - 0.90 10*3/mm3 Final    Eosinophils, Absolute 09/23/2024 0.07  0.00 - 0.40 10*3/mm3 Final    Basophils, Absolute 09/23/2024 0.02  0.00 - 0.20 10*3/mm3 Final    Immature Grans, Absolute 09/23/2024 0.01  0.00 - 0.05 10*3/mm3 Final    nRBC 09/23/2024 0.0  0.0 - 0.2 /100 WBC Final    Protime 09/23/2024 13.3  12.1 - 14.7 Seconds Final    INR 09/23/2024 1.00  0.90 - 1.10 Final    D-Dimer, Quantitative 09/23/2024 0.31  0.00 - 0.51 MCGFEU/mL Final    HS Troponin T 09/23/2024 <6  <22 ng/L Final    Troponin T Delta 09/23/2024    Final    Unable to calculate.   Lab on 09/06/2024   Component Date Value Ref Range Status    Yellow Jacket Venom 09/06/2024 1.84 (A)  Class III kU/L Final    Honeybee Venom 09/06/2024 0.87 (A)  Class II kU/L Final        Levels of Specific IgE       Class  Description of Class      ---------------------------  -----  --------------------                     < 0.10         0         Negative             0.10 -    0.31         0/I       Equivocal/Low             0.32 -    0.55         I         Low             0.56 -    1.40         II        Moderate             1.41 -    3.90         III       High             3.91 -   19.00         IV        Very High            19.01 -  100.00         V         Very High                    >100.00         VI        Very High    Yellow Hornet Venom 09/06/2024 1.51 (A)  Class III kU/L Final    White Hornet Venom 09/06/2024 3.10 (A)  Class III kU/L Final    Paper Wasp Venom 09/06/2024 2.83 (A)  Class III kU/L Final        IPSS Questionnaire (AUA-7):  Over the past month…    1)  How often have you had a sensation of not emptying your bladder completely after you finish urinating?  5 - Almost always   2)  How often have you had to urinate again less than two hours after you finished urinating? 3 - About half the time   3)  How often have you found you stopped and started again several times when you urinated?  3 - About half the  time   4) How difficult have you found it to postpone urination?  5 - Almost always   5) How often have you had a weak urinary stream?  2 - Less than half the time   6) How often have you had to push or strain to begin urination?  3 - About half the time   7) How many times did you most typically get up to urinate from the time you went to bed until the time you got up in the morning?  3 - 3 times   Total Score:  24     1. BPH with obstruction/LUTS  - Continue Flomax 0.4 mg daily.  - Pending urine culture results for definitive plan.  - AZO prescribed.    2. Dysuria  - Pending urine culture results for definitive plan    Follow up in 1 year.      Smoking Cessation Counseling:  Current every day smoker. less than 3 minutes spent counseling. Has reduced tobacco use.  I advised patient to quit tobacco use and offered support.  I provided patient with tobacco cessation educational material printed in the patient's After Visit Summary.     Follow Up   Return in about 1 year (around 11/22/2025) for Next scheduled follow up, FOR  BPH W LUTS/PSA/-PROSTATE GERARDO/MED REFILLS/LABS.    Patient was given instructions and counseling regarding his condition or for health maintenance advice. Please see specific information pulled into the AVS if appropriate.          This document has been electronically signed by Griselda Cheng-Akwa, APRN   November 22, 2024 14:33 EST      Dictated Utilizing Dragon Dictation: Part of this note may be an electronic transcription/translation of spoken language to printed text using the Dragon Dictation System.      Patient or patient representative verbalized consent to the visit recording.  I have personally performed the services described in this document as transcribed by the above individual, and it is both accurate and complete.

## 2024-11-23 LAB — BACTERIA SPEC AEROBE CULT: NO GROWTH

## 2024-11-25 ENCOUNTER — TELEPHONE (OUTPATIENT)
Dept: UROLOGY | Facility: CLINIC | Age: 54
End: 2024-11-25
Payer: COMMERCIAL

## 2024-11-25 LAB
PSA FREE MFR SERPL: 38 %
PSA FREE SERPL-MCNC: 0.19 NG/ML
PSA SERPL-MCNC: 0.5 NG/ML (ref 0–4)

## 2024-11-25 NOTE — TELEPHONE ENCOUNTER
I called the pt and let him know his imaging showed no kidney stones and that if his back pain persists see his PCP.      ----- Message from Griselda Cheng-Akwa sent at 11/25/2024  1:07 PM EST -----  Please let patient know his kidney is x-rays negative for any kidney stones.  Source of back pain not noted on  KUB.  Follow-up with primary care for back pain if it persists.       FINDINGS: An AP view of the abdomen and pelvis demonstrates an  unremarkable bowel gas pattern with no evidence of obstruction. The  patient is status post cholecystectomy. No radiopaque stones are seen  overlying the renal shadows.     IMPRESSION:  Unremarkable exam.

## 2024-11-26 ENCOUNTER — OFFICE VISIT (OUTPATIENT)
Dept: FAMILY MEDICINE CLINIC | Facility: CLINIC | Age: 54
End: 2024-11-26
Payer: COMMERCIAL

## 2024-11-26 ENCOUNTER — DISEASE STATE MANAGEMENT VISIT (OUTPATIENT)
Dept: PHARMACY | Facility: HOSPITAL | Age: 54
End: 2024-11-26
Payer: COMMERCIAL

## 2024-11-26 VITALS
RESPIRATION RATE: 18 BRPM | WEIGHT: 242.4 LBS | HEIGHT: 67 IN | SYSTOLIC BLOOD PRESSURE: 110 MMHG | HEART RATE: 91 BPM | BODY MASS INDEX: 38.04 KG/M2 | OXYGEN SATURATION: 97 % | DIASTOLIC BLOOD PRESSURE: 68 MMHG

## 2024-11-26 DIAGNOSIS — F41.8 DEPRESSION WITH ANXIETY: ICD-10-CM

## 2024-11-26 DIAGNOSIS — R56.9 SEIZURES: ICD-10-CM

## 2024-11-26 DIAGNOSIS — R10.30 LOWER ABDOMINAL PAIN: ICD-10-CM

## 2024-11-26 DIAGNOSIS — I10 ESSENTIAL HYPERTENSION: ICD-10-CM

## 2024-11-26 DIAGNOSIS — Z01.818 PREOP EXAMINATION: Primary | ICD-10-CM

## 2024-11-26 DIAGNOSIS — Z51.81 ENCOUNTER FOR THERAPEUTIC DRUG MONITORING: ICD-10-CM

## 2024-11-26 DIAGNOSIS — M76.61 ACHILLES TENDINITIS OF RIGHT LOWER EXTREMITY: ICD-10-CM

## 2024-11-26 DIAGNOSIS — Z79.899 ENCOUNTER FOR LONG-TERM (CURRENT) USE OF MEDICATIONS: ICD-10-CM

## 2024-11-26 PROBLEM — S86.311S PERONEAL TENDON TEAR, RIGHT, SEQUELA: Status: ACTIVE | Noted: 2024-11-13

## 2024-11-26 RX ORDER — PHENYTOIN SODIUM 100 MG/1
200 CAPSULE, EXTENDED RELEASE ORAL 2 TIMES DAILY
Qty: 120 CAPSULE | Refills: 5 | Status: SHIPPED | OUTPATIENT
Start: 2024-11-26

## 2024-11-26 RX ORDER — HYDROCODONE BITARTRATE AND ACETAMINOPHEN 5; 325 MG/1; MG/1
1 TABLET ORAL EVERY 6 HOURS PRN
Qty: 12 TABLET | Refills: 0 | Status: SHIPPED | OUTPATIENT
Start: 2024-11-26

## 2024-11-26 RX ORDER — IBUPROFEN 600 MG/1
600 TABLET, FILM COATED ORAL EVERY 6 HOURS PRN
Qty: 90 TABLET | Refills: 1 | Status: SHIPPED | OUTPATIENT
Start: 2024-11-26

## 2024-11-26 RX ORDER — DICYCLOMINE HYDROCHLORIDE 10 MG/1
10 CAPSULE ORAL
Qty: 30 CAPSULE | Refills: 0 | Status: SHIPPED | OUTPATIENT
Start: 2024-11-26

## 2024-11-26 NOTE — PROGRESS NOTES
[facility] Patient Management Program  Lipid Management Program - PCSK9i follow up Assessment     Jaquan Mckay is a 53 y.o. male referred by their provider, Tiera Valenzuela, to the Hyperlipidemia Patient Management program offered by [facility] for Lipid Management.  Jaquan Mckay is  treated for ASCVD and hyperlipidemia, and currently takes crestor 40 mg.  In the past, Pt has tried lipitor 40 mg, zocor 10 mg, pravastatin 80 mg and is not statin intolerant. The patient denies any allergies to latex.       A follow-up outreach was conducted, including assessment of continued therapy appropriateness, medication adherence, and side effect incidence and management for Repatha. Patient comes to clinic for injections and has not missed any doses.  Repatha was started on 10/29/24, and patient reports tolerating the medication well with no side effects.    Patient reports he will be having ankle surgery on December 4th. He said he will have someone bring him in to appointments since he will not be able to drive for a period of time (right ankle surgery). Patient reports he will be seeing his PCP today for a follow up as well.    Changes to Insurance Coverage or Financial Support  [employer] [place] (no change)    Relevant Past Medical History and Comorbidities  Relevant medical history and concomitant health conditions were discussed with the patient. The patient's chart has been reviewed for relevant past medical history and comorbid health conditions and updated as necessary.   Past Medical History:   Diagnosis Date    Allergic     Anxiety     Arthritis     Asthma     Body piercing     REPORTS CYLICONE IN EARS    Clotting disorder 2004    had a knee surgery    Coronary artery disease     Depression     DVT (deep venous thrombosis)     RIGHT RIGHT KNEE AFTER SURGERY YEARS AGO IN 2001 OR 2004    Elevated cholesterol      "Gastric ulcer     GERD (gastroesophageal reflux disease)     H/O migraine     Headache     Heart attack     REPORTS \"LIGHT HEART ATTACK A LONG TIME AGO\"  \"EARLY 90'S\"    History of seizures     REPORTS LAST EPISODE WAS AROUND 1995.    Hostility     Hyperlipidemia     Hypertension     Knee pain, acute     Left    Low back pain     Lyme disease     Migraine     MRSA (methicillin resistant Staphylococcus aureus)     REPORTS LAST TESTED + 2004. WAS TREATED HE REPORTS.  RIGHT ARM, RIGHT KNEE.    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     Obesity     Poor historian     [place] spotted fever     Seizures     Sleep apnea     Tattoo     Wears glasses      Social History     Socioeconomic History    Marital status:      Spouse name: Becca    Number of children: 2    Years of education: 12   Tobacco Use    Smoking status: Every Day     Current packs/day: 1.00     Average packs/day: 1 pack/day for 17.7 years (17.7 ttl pk-yrs)     Types: Cigars, Cigarettes     Start date: 4/11/2022     Passive exposure: Current    Smokeless tobacco: Never   Vaping Use    Vaping status: Never Used   Substance and Sexual Activity    Alcohol use: No    Drug use: No    Sexual activity: Defer     Partners: Female     Birth control/protection: None          Hospitalizations and Urgent Care Since Last Assessment  ED Visits, Admissions, or Hospitalizations: none  Urgent Office Visits: none    Allergies  Known allergies and reactions were discussed with the patient. The patient's chart has been reviewed for allergy information and updated as necessary.   Allergies   Allergen Reactions    Ciprofloxacin Anaphylaxis and Hives    Miralax [Polyethylene Glycol] Itching and Rash    Mobic [Meloxicam] Other (See Comments)     Pt states, \"It make my feet and hands go numb and I can't hardly walk.\"     Paxil [Paroxetine Hcl] Shortness Of Breath     Chest pain     Peanut-Containing Drug Products Anaphylaxis    Penicillins Anaphylaxis    Pristiq [Desvenlafaxine " Succinate Er] Dizziness    Sulfa Antibiotics Anaphylaxis, Itching and Rash    Doxycycline Hives    Fish-Derived Products Hives    Isosorbide Nitrate Rash     Rash, hives, had to use inhaler.     Lyrica [Pregabalin] Hives    Movantik [Naloxegol] Rash    Seroquel [Quetiapine] Hives and Rash    Trulance [Plecanatide] Hives    Buspar [Buspirone] Rash    Clarithromycin Rash    Clindamycin/Lincomycin Rash    Codeine Rash    Contrast Dye (Echo Or Unknown Ct/Mr) Itching and Rash    Diltiazem Rash    Flomax [Tamsulosin] Hives    Gabapentin Rash    Iodinated Contrast Media Itching and Rash    Keflex [Cephalexin] Rash    Levocetirizine Rash    Linzess [Linaclotide] Rash    Metoprolol Rash    Prednisone Rash and Other (See Comments)     Face, feet, and legs go completely numb per patient    Robitussin Cough+ Chest Max St [Dextromethorphan-Guaifenesin] Itching    Shrimp (Diagnostic) Rash    Spironolactone Rash    Viibryd [Vilazodone Hcl] Itching and Rash    Zoloft [Sertraline Hcl] Hives and Itching     Allergies reviewed by Melinda Case, PharmD on 11/26/2024 at  9:14 AM  Allergies reviewed by Melinda Case, PharmD on 11/26/2024 at  9:14 AM    Relevant Laboratory Values  Relevant laboratory values were discussed with the patient. The following specialty medication dose adjustment(s) are recommended: none    Lab Results   Component Value Date    GLUCOSE 128 (H) 10/17/2024    CALCIUM 8.9 10/17/2024     10/17/2024    K 3.9 10/17/2024    CO2 24.0 10/17/2024     10/17/2024    BUN 12 10/17/2024    CREATININE 1.12 10/17/2024    EGFRIFAFRI  08/26/2016      Comment:      <15 Indicative of kidney failure.    EGFRIFNONA 66 02/02/2022    BCR 10.7 10/17/2024    ANIONGAP 10.0 10/17/2024     Lab Results   Component Value Date    CHOL 215 (H) 10/17/2024    CHLPL 232 (H) 04/05/2016    TRIG 98 10/17/2024    HDL 66 (H) 10/17/2024     (H) 10/17/2024       Current Medication List  This medication list has been reviewed  with the patient and evaluated for any interactions or necessary modifications/recommendations, and updated to include all prescription medications, OTC medications, and supplements the patient is currently taking.  This list reflects what is contained in the patient's profile, which has also been marked as reviewed to communicate to other providers it is the most up to date version of the patient's current medication therapy.     Current Outpatient Medications:     acetaminophen (TYLENOL) 500 MG tablet, Take 1 tablet by mouth Every 6 (Six) Hours As Needed for Mild Pain., Disp: 30 tablet, Rfl: 2    albuterol sulfate  (90 Base) MCG/ACT inhaler, Inhale 2 puffs by mouth every 4 hours as needed., Disp: 18 g, Rfl: 3    Alcohol Swabs (Alcohol Wipes) 70 % pads, 1 each 2 (Two) Times a Day., Disp: 100 each, Rfl: 5    aspirin (Aspirin EC Adult Low Dose) 81 MG EC tablet, Take 1 tablet by mouth Daily., Disp: 30 tablet, Rfl: 3    azelastine (ASTELIN) 0.1 % nasal spray, , Disp: , Rfl:     buPROPion (WELLBUTRIN) 75 MG tablet, Take 1 tablet by mouth Daily., Disp: 30 tablet, Rfl: 5    calcium polycarbophil (FiberCon) 625 MG tablet, Take 1 tablet by mouth 2 (Two) Times a Day., Disp: 60 tablet, Rfl: 3    clobetasol (TEMOVATE) 0.05 % ointment, Apply 1 application topically to the appropriate area as directed 2 (Two) Times a Day., Disp: 60 g, Rfl: 2    dexlansoprazole (Dexilant) 60 MG capsule, Take 1 capsule by mouth 2 (Two) Times a Day., Disp: 60 capsule, Rfl: 13    dicyclomine (BENTYL) 10 MG capsule, Take 1 capsule by mouth 4 (Four) Times a Day Before Meals & at Bedtime., Disp: 30 capsule, Rfl: 0    Dilantin 100 MG capsule, Take 2 capsules by mouth 2 (Two) Times a Day., Disp: 120 capsule, Rfl: 2    diphenhydrAMINE (Benadryl Allergy) 25 MG tablet, Take 1-2 tablets by mouth Every 8 (Eight) Hours As Needed for Itching (or rash)., Disp: 120 tablet, Rfl: 2    Elastic Bandages & Supports (ACE Ankle Brace) misc, 1 each Daily., Disp: 1  each, Rfl: 0    EPINEPHrine (EPIPEN) 0.3 MG/0.3ML solution auto-injector injection, Inject Intramuscularly into lateral thigh as needed for treatment of anaphylaxis, then go to the ER or call 911., Disp: 2 each, Rfl: 2    ergocalciferol (ERGOCALCIFEROL) 1.25 MG (25932 UT) capsule, Take 1 capsule by mouth 1 (One) Time Per Week., Disp: 4 capsule, Rfl: 3    Evolocumab (REPATHA) solution auto-injector SureClick injection, Inject 1 mL under the skin into the appropriate area as directed Every 14 (Fourteen) Days., Disp: 2 mL, Rfl: 5    fluticasone (FLONASE) 50 MCG/ACT nasal spray, Use 1 spray in each nostril Twice a day for 30 days, Disp: 16 g, Rfl: 11    fluticasone (Flovent HFA) 110 MCG/ACT inhaler, Inhale 1 puff by mouth 2 (Two) Times a Day., Disp: 12 g, Rfl: 5    fluticasone (FLOVENT HFA) 44 MCG/ACT inhaler, Inhale 1 puff by mouth twice a day., Disp: 10.6 g, Rfl: 5    glucose blood (OneTouch Ultra) test strip, Use as instructed 2 times daily and as needed, Disp: 100 each, Rfl: 3    guaiFENesin (Mucinex) 600 MG 12 hr tablet, Take 2 tablets by mouth 2 (Two) Times a Day., Disp: 20 tablet, Rfl: 0    ibuprofen (ADVIL,MOTRIN) 600 MG tablet, Take 1 tablet by mouth Every 6 (Six) Hours As Needed for Mild Pain., Disp: 20 tablet, Rfl: 0    ipratropium-albuterol (COMBIVENT RESPIMAT)  MCG/ACT inhaler, Inhale 1 puff by mouth 4 (Four) Times a Day As Needed for Wheezing., Disp: 4 g, Rfl: 0    ipratropium-albuterol (DUO-NEB) 0.5-2.5 mg/3 ml nebulizer, Inhale the contents of 3 mL vial nebulizer every 6 hours as needed., Disp: 360 mL, Rfl: 3    Lancets (OneTouch Delica Plus [id number]) misc, Use as instructed 2 times daily and as needed, Disp: 100 each, Rfl: 3    levocetirizine (XYZAL) 5 MG tablet, Take 1 tablet by mouth in the evening Once a day 30 days, Disp: 30 tablet, Rfl: 11    lisinopril (PRINIVIL,ZESTRIL) 30 MG tablet, Take 1 tablet by mouth Daily for blood pressure., Disp: 90 tablet, Rfl: 1    loratadine (CLARITIN) 10 MG  tablet, Take 1 tablet by mouth Daily As Needed for Allergies., Disp: 90 tablet, Rfl: 2    miconazole (Micatin) 2 % cream, Apply topically to the appropriate area as directed 2 (Two) Times a Day To feet., Disp: 30 g, Rfl: 0    mirtazapine (REMERON) 30 MG tablet, Take 1 & 1/2  tablets by mouth Every Night., Disp: 45 tablet, Rfl: 5    montelukast (SINGULAIR) 10 MG tablet, Take 1 tablet by mouth., Disp: , Rfl:     multivitamin with minerals tablet tablet, Take 1 tablet by mouth Daily., Disp: 30 tablet, Rfl: 12    ondansetron (Zofran) 4 MG tablet, Take 1 tablet by mouth Every 8 (Eight) Hours As Needed for Nausea., Disp: 20 tablet, Rfl: 0    rosuvastatin (Crestor) 40 MG tablet, Take 1 tablet by mouth Daily., Disp: 30 tablet, Rfl: 3    senna (Senokot) 8.6 MG tablet, Take 2 tablets by mouth 2 (Two) Times a Day., Disp: 120 tablet, Rfl: 0    sodium chloride 0.65 % nasal spray, Use 1-3 sprays in each nostril Three times a day as needed 30 days, Disp: 44 mL, Rfl: 11    tamsulosin (Flomax) 0.4 MG capsule 24 hr capsule, Take 1 capsule by mouth Daily., Disp: 30 capsule, Rfl: 3    tamsulosin (FLOMAX) 0.4 MG capsule 24 hr capsule, Take 1 capsule by mouth Daily., Disp: 90 capsule, Rfl: 3    temazepam (Restoril) 15 MG capsule, Take 1 capsule by mouth At Night As Needed for Sleep., Disp: 30 capsule, Rfl: 1    TiZANidine (Zanaflex) 4 MG capsule, Take 1 capsule by mouth 3 (Three) Times a Day., Disp: 30 capsule, Rfl: 5    azithromycin (Zithromax Z-Clark) 250 MG tablet, Take 2 tablets by mouth on day 1, then 1 tablet daily on days 2-5 (Patient not taking: Reported on 11/26/2024), Disp: 6 tablet, Rfl: 0    Medicines reviewed by Melinda Case, PharmD on 11/26/2024 at  9:14 AM    Drug Interactions  None with repatha    Adverse Drug Reactions        Plan for ADR Management: n/a    Adherence, Self-Administration, and Current Therapy Problems  Adherence related to the patient's specialty therapy was discussed with the patient. The Adherence  segment of this outreach has been reviewed and updated.          Additional Barriers to Patient Self-Administration: patient refuses to administer himself  Methods for Supporting Patient Self-Administration: patient comes to [facility] every 2 weeks for injections    Open Medication Therapy Problems  No medication therapy recommendations to display    Goals of Therapy  Goals related to the patient's specialty therapy were discussed with the patient. The Patient Goals segment of this outreach has been reviewed and updated.   Goals Addressed Today    None     LDL was 136 mg/dl on 3/20/24 and has improved to 132 mg/ml on 10/17/29. This lab work was completed prior to starting repatha therapy    Quality of Life Assessment   Quality of Life related to the patient's enrollment in the patient management program and services provided was discussed with the patient. The QOL segment of this outreach has been reviewed and updated.       Reassessment Plan & Follow-Up  1. Medication Therapy Changes: no changes reported in medications  2. Related Plans, Therapy Recommendations, or Issues to Be Addressed: none     3. Pharmacist to perform regular assessments no more than (6) months from the previous assessment.  Patient will continue regular follow-up with referring provider.   4. Care Coordinator to set up future refill outreaches, coordinate prescription delivery, and escalate clinical questions to pharmacist.  5. I administered injection in to the back of his RIGHT arm- Lot:[id number], exp: 02/28/2027    Attestation      I attest the patient was actively involved in and has agreed to the above plan of care.  If the prescribed therapy is at any point deemed not appropriate based on the current or future assessments, a consultation will be initiated with the patient's specialty care provider to determine the best course of action. The revised plan of therapy will be documented along with any required assessments and/or additional  patient education provided.     Patient to follow up for injections in Clinic every 2 weeks    Melinda Case, PharmD  11/26/2024  09:15 EST

## 2024-11-26 NOTE — PROGRESS NOTES
"Subjective   Jaquan Mckay is a 54 y.o. male.     Chief Complaint   Patient presents with    Essential hypertension       History of Present Illness     Surgery clearance-will be having surgery on December 4.  He will need clearance for surgery.    HTN-chronic and ongoing.  Patient under care of cardiology.  Is currently on lisinopril 30 mg.  No negative side effects.   Occasional CP but not in several months.  No palpitations.    Hyperlipidemia-chronic and ongoing.  Patient is currently on Crestor 40 mg.  No negative side effects.  Patient denies any negative side effects of cholesterol medication.  No reported myalgia or myopathies.  He has been added Repatha injection.  He is taking 1 injection every 2 weeks.  Depression and anxiety-is under the care of of therapist at [facility].  He was added Wellbutrin 75 mg at his last visit.  He continues Remeron 30 mg 1 to 1-1/2 tablets nightly for insomnia.  His mental health has declined as the winter months have began.  He tolerated well, feels he has more energy, and has been smoking less.  He would like to continue.   Foot Pain-had a fall and turned his right ankle.  He reports that \"I fell about 6 times\".  He reports that he stepped in with his right foot, his foot slid off and he fell.  MRI shows achilles tendinopathy with intermediate grade tear 2 cm from insertion.  Peritenonitis, moderate bursitis.  He will be having repair on Decebmer 4th.  He is currently in a walking boot.  MRI was on 10/30/2024.  He continues to wear his left foot brace.  Surgery will be done at [facility].  He has a cane but is unsteady.  He has a walker at home and would like to have a W/C to aide with his mobility.    Medication questions-would like to review his medication list and allergy list.  His list from [facility] is not UTD. He needs an updated list for his surgery.     The following portions of the patient's history were reviewed and updated as appropriate: CC, ROS, allergies, " "current medications, past family history, past medical history, past social history, past surgical history and problem list.    Review of Systems   Constitutional:  Positive for fatigue. Negative for appetite change, unexpected weight gain and unexpected weight loss.   HENT:  Negative for congestion, ear pain, postnasal drip, rhinorrhea, sore throat, swollen glands, trouble swallowing and voice change.    Eyes:  Negative for pain and visual disturbance.   Respiratory:  Negative for cough, chest tightness, shortness of breath and wheezing.    Cardiovascular:  Negative for chest pain, palpitations and leg swelling.   Gastrointestinal:  Positive for abdominal pain, constipation and diarrhea. Negative for blood in stool, nausea and indigestion.   Genitourinary:  Negative for dysuria, hematuria and urgency.   Musculoskeletal:  Positive for arthralgias, back pain, gait problem and myalgias. Negative for joint swelling.   Skin:  Negative for color change and skin lesions.   Allergic/Immunologic: Negative.    Neurological:  Negative for dizziness, numbness and headache.   Hematological: Negative.    Psychiatric/Behavioral:  Positive for stress. Negative for dysphoric mood, sleep disturbance and suicidal ideas. The patient is not nervous/anxious.    All other systems reviewed and are negative.      Objective     /68   Pulse 91   Resp 18   Ht 170.2 cm (67\")   Wt 110 kg (242 lb 6.4 oz)   SpO2 97%   BMI 37.97 kg/m²     Physical Exam  Vitals reviewed.   Constitutional:       General: He is not in acute distress.     Appearance: He is well-developed. He is obese. He is not diaphoretic.   HENT:      Head: Normocephalic and atraumatic.      Jaw: No tenderness.      Right Ear: Hearing, tympanic membrane, ear canal and external ear normal.      Left Ear: Hearing, tympanic membrane, ear canal and external ear normal.      Nose: Nose normal. No nasal tenderness or congestion.      Right Sinus: No maxillary sinus tenderness " or frontal sinus tenderness.      Left Sinus: No maxillary sinus tenderness or frontal sinus tenderness.      Mouth/Throat:      Lips: Pink.      Mouth: Mucous membranes are moist.      Pharynx: Oropharynx is clear. Uvula midline.   Eyes:      General: Lids are normal. No scleral icterus.     Extraocular Movements:      Right eye: Normal extraocular motion and no nystagmus.      Left eye: Normal extraocular motion and no nystagmus.      Conjunctiva/sclera: Conjunctivae normal.      Pupils: Pupils are equal, round, and reactive to light.   Neck:      Thyroid: No thyromegaly or thyroid tenderness.      Vascular: No carotid bruit or JVD.      Trachea: No tracheal tenderness.   Cardiovascular:      Rate and Rhythm: Normal rate and regular rhythm.      Pulses:           Dorsalis pedis pulses are 2+ on the right side and 2+ on the left side.        Posterior tibial pulses are 2+ on the right side and 2+ on the left side.      Heart sounds: Normal heart sounds, S1 normal and S2 normal. No murmur heard.  Pulmonary:      Effort: Pulmonary effort is normal. No accessory muscle usage, prolonged expiration or respiratory distress.      Breath sounds: Normal breath sounds.   Chest:      Chest wall: No tenderness.   Abdominal:      General: Bowel sounds are normal. There is no distension.      Palpations: Abdomen is soft. There is no hepatomegaly, splenomegaly or mass.      Tenderness: There is no abdominal tenderness.   Musculoskeletal:         General: Tenderness present.      Cervical back: Normal range of motion and neck supple.      Lumbar back: Tenderness present.      Right lower leg: No edema.      Left lower leg: No edema.      Right ankle: Tenderness present. Decreased range of motion.      Left ankle: Tenderness present. Decreased range of motion.      Right foot: Decreased range of motion. Normal capillary refill. Swelling and tenderness present.      Left foot: Decreased range of motion. Normal capillary refill.  Tenderness present.      Comments: No muscular atrophy or flaccidity.  Left foot with brace for support  Right foot in walking boot   Lymphadenopathy:      Head:      Right side of head: No submental or submandibular adenopathy.      Left side of head: No submental or submandibular adenopathy.      Cervical: No cervical adenopathy.      Right cervical: No superficial cervical adenopathy.     Left cervical: No superficial cervical adenopathy.   Skin:     General: Skin is warm and dry.      Capillary Refill: Capillary refill takes less than 2 seconds.      Coloration: Skin is not jaundiced or pale.      Findings: No erythema.      Nails: There is no clubbing.   Neurological:      Mental Status: He is alert and oriented to person, place, and time.      Cranial Nerves: No cranial nerve deficit or facial asymmetry.      Sensory: No sensory deficit.      Motor: No weakness, tremor, atrophy or abnormal muscle tone.      Coordination: Coordination normal.      Gait: Gait abnormal (moderate antalgia).      Deep Tendon Reflexes: Reflexes are normal and symmetric.   Psychiatric:         Attention and Perception: He is attentive.         Mood and Affect: Mood normal. Mood is not anxious or depressed.         Speech: Speech normal.         Behavior: Behavior normal. Behavior is cooperative.         Thought Content: Thought content normal.         Cognition and Memory: Cognition normal.         Judgment: Judgment normal.         Diagnoses and all orders for this visit:    1. Preop examination (Primary)  Comments:  patient considered low risk surgical candidate.  chronic conditons are stable.    2. Essential hypertension  Assessment & Plan:  Continue lisinopril 30 mg.  Continue under the care of cardiology.  Ambulatory BP monitoring either at home or random community checks.  Patient to report continued elevations >140/90.  Patient may come by office for checks if needed.         3. Seizures  Comments:  No known recent activity.   Continue Dilantin.  We will plan for an updated phenytoin level  Orders:  -     Dilantin 100 MG capsule; Take 2 capsules by mouth 2 (Two) Times a Day.  Dispense: 120 capsule; Refill: 5    4. Depression with anxiety  Comments:  continue Wellbutrin.  Continue restoril for insomnia.  continue under care of comp care therapist.    5. Lower abdominal pain  Comments:  KUB ordered.  Results will be discussed with him when available.  Bentyl 10 mg 4 times daily short-term if KUB is negative for stool burden  Orders:  -     dicyclomine (BENTYL) 10 MG capsule; Take 1 capsule by mouth 4 (Four) Times a Day Before Meals & at Bedtime.  Dispense: 30 capsule; Refill: 0    6. Achilles tendinitis of right lower extremity  -     tiZANidine (Zanaflex) 4 MG tablet; Take 1 tablet by mouth 3 (Three) Times a Day.  Dispense: 30 tablet; Refill: 5  -     ibuprofen (ADVIL,MOTRIN) 600 MG tablet; Take 1 tablet by mouth Every 6 (Six) Hours As Needed for Mild Pain.  Dispense: 90 tablet; Refill: 1  -     HYDROcodone-acetaminophen (Norco) 5-325 MG per tablet; Take 1 tablet by mouth Every 6 (Six) Hours As Needed for Moderate Pain.  Dispense: 12 tablet; Refill: 0    7. Encounter for therapeutic drug monitoring  -     Urine Drug Screen - Urine, Clean Catch; Future    8. Encounter for long-term (current) use of medications  -     Urine Drug Screen - Urine, Clean Catch; Future        Understands disease processes and need for medications.  Understands reasons for urgent and emergent care.  Patient (& family) verbalized agreement for treatment plan.   Emotional support and active listening provided.  Patient provided time to verbalize feelings.    CHAVEZ/LINN reviewed today and consistent.  Will refill prescribed controlled medication today.  Patient is aware they cannot receive narcotics from any other provider except if under care of pain management or speciality clinic.  Risk and benefits of medication use has been reviewed.  History and physical exam  exhibit continued safe and appropriate use of controlled substances.  The patient is aware of the potential for addiction and dependence.  This patient has been made aware of the appropriate use of such medications, including potential risk of somnolence, limited ability to drive and / or work safely, and potential for overdose.    It has also been made clear that these medications are for use by this patient only, without concomitant use of alcohol or other substances unless prescribed/advised by medical provider.  Patient understands they may be subject to UDS and pill counts at random.    Patient considered to be low risk for addiction due to use of single controlled medications.  Patient understands and accepts these risks.  Patient need for medication will be reassessed at each visit.  Doses will be adjusted according to patient need and findings.    Goal of [place]: Patient will not have any adverse reactions of medication.  Patient will have improvement in sleep hygiene/pattern with use of Restoril as needed as directed.    CHAVEZ Patient Controlled Substance Report (from 11/27/2023 to 11/25/2024)    Dispensed  Strength Quantity Days Supply Provider Pharmacy   11/15/2024 [facility]   10/09/2024 Temazepam 15MG 30 each 30 [facility]...   08/29/2024 Pregabalin 75MG 60 each 30 [facility]   08/26/2024 Hydrocodone Bitartrate/Ac 325MG/5MG [facility]   08/14/2024 Temazepam 15MG 30 each 30 [facility]        Updated medication list provided.     RTC 2 months, sooner if needed.           This document has been electronically signed by:  BALDOMERO Thao FNP-C Dragon disclaimer:  Part of this note may be an electronic transcription/translation of spoken language to printed text using the Dragon Dictation System.

## 2024-12-02 ENCOUNTER — OFFICE VISIT (OUTPATIENT)
Dept: FAMILY MEDICINE CLINIC | Facility: CLINIC | Age: 54
End: 2024-12-02
Payer: COMMERCIAL

## 2024-12-02 ENCOUNTER — TELEPHONE (OUTPATIENT)
Dept: UROLOGY | Facility: CLINIC | Age: 54
End: 2024-12-02
Payer: COMMERCIAL

## 2024-12-02 VITALS
BODY MASS INDEX: 38.77 KG/M2 | DIASTOLIC BLOOD PRESSURE: 80 MMHG | SYSTOLIC BLOOD PRESSURE: 110 MMHG | HEART RATE: 83 BPM | TEMPERATURE: 98.1 F | WEIGHT: 247 LBS | OXYGEN SATURATION: 99 % | HEIGHT: 67 IN

## 2024-12-02 DIAGNOSIS — J01.00 ACUTE NON-RECURRENT MAXILLARY SINUSITIS: Primary | ICD-10-CM

## 2024-12-02 PROCEDURE — 1159F MED LIST DOCD IN RCRD: CPT | Performed by: PHYSICIAN ASSISTANT

## 2024-12-02 PROCEDURE — 99213 OFFICE O/P EST LOW 20 MIN: CPT | Performed by: PHYSICIAN ASSISTANT

## 2024-12-02 PROCEDURE — 3079F DIAST BP 80-89 MM HG: CPT | Performed by: PHYSICIAN ASSISTANT

## 2024-12-02 PROCEDURE — 3074F SYST BP LT 130 MM HG: CPT | Performed by: PHYSICIAN ASSISTANT

## 2024-12-02 PROCEDURE — 1160F RVW MEDS BY RX/DR IN RCRD: CPT | Performed by: PHYSICIAN ASSISTANT

## 2024-12-02 PROCEDURE — 1125F AMNT PAIN NOTED PAIN PRSNT: CPT | Performed by: PHYSICIAN ASSISTANT

## 2024-12-02 RX ORDER — AZITHROMYCIN 250 MG/1
TABLET, FILM COATED ORAL
Qty: 6 TABLET | Refills: 0 | Status: SHIPPED | OUTPATIENT
Start: 2024-12-02

## 2024-12-02 RX ORDER — BENZONATATE 100 MG/1
100 CAPSULE ORAL 3 TIMES DAILY PRN
Qty: 30 CAPSULE | Refills: 0 | Status: SHIPPED | OUTPATIENT
Start: 2024-12-02

## 2024-12-02 RX ORDER — GUAIFENESIN 600 MG/1
1200 TABLET, EXTENDED RELEASE ORAL 2 TIMES DAILY
Qty: 28 TABLET | Refills: 0 | Status: SHIPPED | OUTPATIENT
Start: 2024-12-02 | End: 2024-12-09

## 2024-12-02 NOTE — TELEPHONE ENCOUNTER
I called the pt and let him know.    ----- Message from Griselda Cheng-Akwa sent at 11/27/2024  1:06 PM EST -----  Please kindly let patient know PSA results.  0.5 with a free PSA of 38% which is unremarkable at this time.  Follow-up in 1 year.  Sooner if need be.  Thank you    PSA  0.0 - 4.0 ng/mL 0.5 0.6 CM 0.5 CM 1.0 CM 0.8 CM 0.59 CM 2.50  Comment: Roche ECLIA methodology.  According to the American Urological Association, Serum PSA should  decrease and remain at undetectable levels after radical  prostatectomy. The AUA defines biochemical recurrence as an initial  PSA value 0.2 ng/mL or greater followed by a subsequent confirmatory  PSA value 0.2 ng/mL or greater.  Values obtained with different assay methods or kits cannot be used  interchangeably. Results cannot be interpreted as absolute evidence  of the presence or absence of malignant disease.  PSA, Free  N/A ng/mL 0.19 0.18 CM 0.20 CM  0.20 CM    Comment: Roche ECLIA methodology.  PSA, Free %  % 38.0 30.0 CM 40.0 CM  25.0 CM    Comment: The table below lists the probability of prostate cancer for  men with non-suspicious GERARDO results and total PSA between  4 and 10 ng/mL, by patient age (Oni et al, ATIF 1998,  279:1542).                    % Free PSA       50-64 yr        65-75 yr                    0.00-10.00%        56%             55%                   10.01-15.00%        24%             35%                   15.01-20.00%        17%             23%                   20.01-25.00%        10%             20%                        >25.00%         5%              9%  Please note:  Oni et al did not make specific                recommendations regarding the use of                percent free PSA for any other populati

## 2024-12-02 NOTE — PROGRESS NOTES
Subjective        Chief Complaint  Nasal Congestion    Subjective      Jaquan Mckay is a 54 y.o. male who presents today to [facility] for Nasal Congestion. Past medical history is significant for prior history of brain tumor s/p removal, seizure disorder on dilantin, HTN, HLD, generalized anxiety disorder, and obesity per BMI.      Nasal Congestion:  He reports about a 5-day history of runny nose, sinus congestion, sinus pressure, and facial pain.  Denies any known sick contacts.  No fever, chills, nausea, vomiting, diarrhea. Has had cough which is mostly dry. No wheezing. He has an upcoming foot/ankle surgery in 2 days.       Current Outpatient Medications:     albuterol sulfate  (90 Base) MCG/ACT inhaler, Inhale 2 puffs by mouth every 4 hours as needed., Disp: 18 g, Rfl: 3    Alcohol Swabs (Alcohol Wipes) 70 % pads, 1 each 2 (Two) Times a Day., Disp: 100 each, Rfl: 5    aspirin (Aspirin EC Adult Low Dose) 81 MG EC tablet, Take 1 tablet by mouth Daily., Disp: 30 tablet, Rfl: 3    azelastine (ASTELIN) 0.1 % nasal spray, , Disp: , Rfl:     buPROPion (WELLBUTRIN) 75 MG tablet, Take 1 tablet by mouth Daily., Disp: 30 tablet, Rfl: 5    clobetasol (TEMOVATE) 0.05 % ointment, Apply 1 application topically to the appropriate area as directed 2 (Two) Times a Day., Disp: 60 g, Rfl: 2    dexlansoprazole (Dexilant) 60 MG capsule, Take 1 capsule by mouth 2 (Two) Times a Day., Disp: 60 capsule, Rfl: 13    dicyclomine (BENTYL) 10 MG capsule, Take 1 capsule by mouth 4 (Four) Times a Day Before Meals & at Bedtime., Disp: 30 capsule, Rfl: 0    Dilantin 100 MG capsule, Take 2 capsules by mouth 2 (Two) Times a Day., Disp: 120 capsule, Rfl: 5    diphenhydrAMINE (Benadryl Allergy) 25 MG tablet, Take 1-2 tablets by mouth Every 8 (Eight) Hours As Needed for Itching (or rash)., Disp: 120 tablet, Rfl: 2    Elastic Bandages & Supports (ACE Ankle Brace) misc, 1 each Daily., Disp: 1 each, Rfl: 0     EPINEPHrine (EPIPEN) 0.3 MG/0.3ML solution auto-injector injection, Inject Intramuscularly into lateral thigh as needed for treatment of anaphylaxis, then go to the ER or call 911., Disp: 2 each, Rfl: 2    ergocalciferol (ERGOCALCIFEROL) 1.25 MG (39251 UT) capsule, Take 1 capsule by mouth 1 (One) Time Per Week., Disp: 4 capsule, Rfl: 3    Evolocumab (REPATHA) solution auto-injector SureClick injection, Inject 1 mL under the skin into the appropriate area as directed Every 14 (Fourteen) Days., Disp: 2 mL, Rfl: 5    fluticasone (FLONASE) 50 MCG/ACT nasal spray, Use 1 spray in each nostril Twice a day for 30 days, Disp: 16 g, Rfl: 11    fluticasone (Flovent HFA) 110 MCG/ACT inhaler, Inhale 1 puff by mouth 2 (Two) Times a Day., Disp: 12 g, Rfl: 5    fluticasone (FLOVENT HFA) 44 MCG/ACT inhaler, Inhale 1 puff by mouth twice a day., Disp: 10.6 g, Rfl: 5    glucose blood (OneTouch Ultra) test strip, Use as instructed 2 times daily and as needed, Disp: 100 each, Rfl: 3    HYDROcodone-acetaminophen (Norco) 5-325 MG per tablet, Take 1 tablet by mouth Every 6 (Six) Hours As Needed for Moderate Pain., Disp: 12 tablet, Rfl: 0    ibuprofen (ADVIL,MOTRIN) 600 MG tablet, Take 1 tablet by mouth Every 6 (Six) Hours As Needed for Mild Pain., Disp: 90 tablet, Rfl: 1    ipratropium-albuterol (COMBIVENT RESPIMAT)  MCG/ACT inhaler, Inhale 1 puff by mouth 4 (Four) Times a Day As Needed for Wheezing., Disp: 4 g, Rfl: 0    ipratropium-albuterol (DUO-NEB) 0.5-2.5 mg/3 ml nebulizer, Inhale the contents of 3 mL vial nebulizer every 6 hours as needed., Disp: 360 mL, Rfl: 3    Lancets (OneTouch Delica Plus [id number]) misc, Use as instructed 2 times daily and as needed, Disp: 100 each, Rfl: 3    levocetirizine (XYZAL) 5 MG tablet, Take 1 tablet by mouth in the evening Once a day 30 days, Disp: 30 tablet, Rfl: 11    lisinopril (PRINIVIL,ZESTRIL) 30 MG tablet, Take 1 tablet by mouth Daily for blood pressure., Disp: 90 tablet, Rfl: 1     "miconazole (Micatin) 2 % cream, Apply topically to the appropriate area as directed 2 (Two) Times a Day To feet., Disp: 30 g, Rfl: 0    mirtazapine (REMERON) 30 MG tablet, Take 1 & 1/2  tablets by mouth Every Night., Disp: 45 tablet, Rfl: 5    montelukast (SINGULAIR) 10 MG tablet, Take 1 tablet by mouth., Disp: , Rfl:     multivitamin with minerals tablet tablet, Take 1 tablet by mouth Daily., Disp: 30 tablet, Rfl: 12    ondansetron (Zofran) 4 MG tablet, Take 1 tablet by mouth Every 8 (Eight) Hours As Needed for Nausea., Disp: 20 tablet, Rfl: 0    rosuvastatin (Crestor) 40 MG tablet, Take 1 tablet by mouth Daily., Disp: 30 tablet, Rfl: 3    sodium chloride 0.65 % nasal spray, Use 1-3 sprays in each nostril Three times a day as needed 30 days, Disp: 44 mL, Rfl: 11    tamsulosin (FLOMAX) 0.4 MG capsule 24 hr capsule, Take 1 capsule by mouth Daily., Disp: 90 capsule, Rfl: 3    temazepam (Restoril) 15 MG capsule, Take 1 capsule by mouth At Night As Needed for Sleep., Disp: 30 capsule, Rfl: 1    tiZANidine (Zanaflex) 4 MG tablet, Take 1 tablet by mouth 3 (Three) Times a Day., Disp: 30 tablet, Rfl: 5    azithromycin (Zithromax Z-Clark) 250 MG tablet, Take 2 tablets by mouth on day 1, then 1 tablet daily on days 2-5, Disp: 6 tablet, Rfl: 0    benzonatate (Tessalon Perles) 100 MG capsule, Take 1 capsule by mouth 3 (Three) Times a Day As Needed for Cough., Disp: 30 capsule, Rfl: 0    guaiFENesin (Mucinex) 600 MG 12 hr tablet, Take 2 tablets by mouth 2 (Two) Times a Day for 7 days., Disp: 28 tablet, Rfl: 0      Allergies   Allergen Reactions    Ciprofloxacin Anaphylaxis and Hives    Miralax [Polyethylene Glycol] Itching and Rash    Mobic [Meloxicam] Other (See Comments)     Pt states, \"It make my feet and hands go numb and I can't hardly walk.\"     Paxil [Paroxetine Hcl] Shortness Of Breath     Chest pain     Peanut-Containing Drug Products Anaphylaxis    Penicillins Anaphylaxis    Pristiq [Desvenlafaxine Succinate Er] " "Dizziness    Sulfa Antibiotics Anaphylaxis, Itching and Rash    Doxycycline Hives    Fish-Derived Products Hives    Isosorbide Nitrate Rash     Rash, hives, had to use inhaler.     Lyrica [Pregabalin] Hives    Movantik [Naloxegol] Rash    Seroquel [Quetiapine] Hives and Rash    Trulance [Plecanatide] Hives    Buspar [Buspirone] Rash    Clarithromycin Rash    Clindamycin/Lincomycin Rash    Codeine Rash    Contrast Dye (Echo Or Unknown Ct/Mr) Itching and Rash    Diltiazem Rash    Flomax [Tamsulosin] Hives    Gabapentin Rash    Iodinated Contrast Media Itching and Rash    Keflex [Cephalexin] Rash    Levocetirizine Rash    Linzess [Linaclotide] Rash    Metoprolol Rash    Prednisone Rash and Other (See Comments)     Face, feet, and legs go completely numb per patient    Robitussin Cough+ Chest Max St [Dextromethorphan-Guaifenesin] Itching    Shrimp (Diagnostic) Rash    Spironolactone Rash    Viibryd [Vilazodone Hcl] Itching and Rash    Zoloft [Sertraline Hcl] Hives and Itching       Objective     Objective   Vital Signs:  /80   Pulse 83   Temp 98.1 °F (36.7 °C) (Oral)   Ht 170.2 cm (67\")   Wt 112 kg (247 lb)   SpO2 99%   BMI 38.69 kg/m²   Estimated body mass index is 38.69 kg/m² as calculated from the following:    Height as of this encounter: 170.2 cm (67\").    Weight as of this encounter: 112 kg (247 lb).    Past Medical History:   Diagnosis Date    Allergic     Anxiety     Arthritis     Asthma     Body piercing     REPORTS CYLICONE IN EARS    Clotting disorder 2004    had a knee surgery    Coronary artery disease     Depression     DVT (deep venous thrombosis)     RIGHT RIGHT KNEE AFTER SURGERY YEARS AGO IN 2001 OR 2004    Elevated cholesterol     Gastric ulcer     GERD (gastroesophageal reflux disease)     H/O migraine     Headache     Heart attack     REPORTS \"LIGHT HEART ATTACK A LONG TIME AGO\"  \"EARLY 90'S\"    History of seizures     REPORTS LAST EPISODE WAS AROUND 1995.    Hostility     Hyperlipidemia "     Hypertension     Knee pain, acute     Left    Low back pain     Lyme disease     Migraine     MRSA (methicillin resistant Staphylococcus aureus)     REPORTS LAST TESTED + 2004. WAS TREATED HE REPORTS.  RIGHT ARM, RIGHT KNEE.    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     Obesity     Poor historian     [place] spotted fever     Seizures     Sleep apnea     Tattoo     Wears glasses      Past Surgical History:   Procedure Laterality Date    ABDOMINAL SURGERY      BACK SURGERY      BRAIN SURGERY  1986    Tumor removal     CARDIAC CATHETERIZATION N/A 9/28/2018    Procedure: Left Heart Cath;  Surgeon: Leandro Daily MD;  Location: [facility] CATH [facility]ASIVE LOC[facility];  Service: Cardiology    CHOLECYSTECTOMY      COLONOSCOPY      COLONOSCOPY N/A 8/2/2021    Procedure: COLONOSCOPY FOR SCREENING;  Surgeon: Irving Azar MD;  Location: [facility] OR;  Service: Gastroenterology;  Laterality: N/A;    CYST REMOVAL      pilonidal cyst    ELBOW EPICONDYLECTOMY Right 7/23/2020    Procedure: LATERAL EPICONDYLAR RELEASE;  Surgeon: Jose Ruiz MD;  Location: [facility] OR;  Service: Orthopedics;  Laterality: Right;    ENDOSCOPY      ENDOSCOPY N/A 8/2/2021    Procedure: ESOPHAGOGASTRODUODENOSCOPY WITH BIOPSY;  Surgeon: Irving Azar MD;  Location: [facility] OR;  Service: Gastroenterology;  Laterality: N/A;  esophageal dilatation to 20mm    FRACTURE SURGERY Right     elbow    KNEE ARTHROSCOPY Left 10/20/2017    Procedure: Diagnostic arthroscopy left knee with chondroplasty;  Surgeon: Marco Aguirre MD;  Location: [facility];  Service:     KNEE ARTHROSCOPY Left 1/11/2021    Procedure: KNEE DIAGNOSTIC ARTHROSCOPY WITH  CHONDROPLASTY patella, femoral and medial;  Surgeon: Raul Eagle MD;  Location: [facility] OR;  Service: Orthopedics;  Laterality: Left;    KNEE SURGERY Right     MOUTH SURGERY      FULL MOUTH EXTRACTION    OTHER SURGICAL HISTORY      REPORTS 7 TICKS REMOVED FROM RIGHT ARM IN 2001 OR 2002    TENNIS ELBOW  RELEASE Right 7/23/2020    Procedure: RIGHT TENNIS ELBOW RELEASE;  Surgeon: Jose Ruiz MD;  Location: [facility];  Service: Orthopedics;  Laterality: Right;    TUMOR EXCISION      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     Social History     Socioeconomic History    Marital status:      Spouse name: Becca    Number of children: 2    Years of education: 12   Tobacco Use    Smoking status: Every Day     Current packs/day: 1.00     Average packs/day: 1 pack/day for 17.7 years (17.7 ttl pk-yrs)     Types: Cigars, Cigarettes     Start date: 4/11/2022     Passive exposure: Current    Smokeless tobacco: Never   Vaping Use    Vaping status: Never Used   Substance and Sexual Activity    Alcohol use: No    Drug use: No    Sexual activity: Defer     Partners: Female     Birth control/protection: None      Physical Exam  Vitals and nursing note reviewed.   Constitutional:       General: He is not in acute distress.     Appearance: He is well-developed. He is not diaphoretic.   HENT:      Head: Normocephalic and atraumatic.      Nose: Congestion and rhinorrhea present.      Mouth/Throat:      Pharynx: Posterior oropharyngeal erythema present. No oropharyngeal exudate.      Comments: Tenderness bilateral maxillary sinuses.  Eyes:      General: No scleral icterus.        Right eye: No discharge.         Left eye: No discharge.      Conjunctiva/sclera: Conjunctivae normal.   Cardiovascular:      Rate and Rhythm: Normal rate and regular rhythm.      Heart sounds: Normal heart sounds. No murmur heard.     No friction rub. No gallop.   Pulmonary:      Effort: Pulmonary effort is normal. No respiratory distress.      Breath sounds: Normal breath sounds. No wheezing or rales.   Chest:      Chest wall: No tenderness.   Musculoskeletal:         General: Normal range of motion.      Cervical back: Normal range of motion and neck supple.   Skin:     General: Skin is warm and dry.      Coloration: Skin is not  pale.      Findings: No erythema or rash.   Neurological:      Mental Status: He is alert and oriented to person, place, and time.   Psychiatric:         Behavior: Behavior normal.        Result Review :  The following data was reviewed by: GREG Gracia on 12/02/2024:  Hemoglobin A1C   Date Value Ref Range Status   10/17/2024 5.40 4.80 - 5.60 % Final     TSH   Date Value Ref Range Status   10/17/2024 2.150 0.270 - 4.200 uIU/mL Final     HDL Cholesterol   Date Value Ref Range Status   10/17/2024 66 (H) 40 - 60 mg/dL Final     LDL Cholesterol    Date Value Ref Range Status   10/17/2024 132 (H) 0 - 100 mg/dL Final     Triglycerides   Date Value Ref Range Status   10/17/2024 98 0 - 150 mg/dL Final     Total Cholesterol   Date Value Ref Range Status   04/05/2016 232 (H) 0 - 200 mg/dL Final     Comment:       Cholesterol Reference Range:      Desirable                 < 200mg/dl      Borderline High        200-239mg/dl      High Risk                 > 239mg/dl             Assessment / Plan         Assessment   Diagnoses and all orders for this visit:    1. Acute non-recurrent maxillary sinusitis (Primary)  He declined viral testing for influenza and COVID.  Has multiple allergies listed (34).  He reports that azithromycin Dosepak and medrol dose pack is all he can really tolerate.  Do not recommend steroid therapy at this time given upcoming surgery later this week and may delay healing.  Add Mucinex and Tessalon Perles.  He reports he has been using nasal spray at home and it makes him sick to his stomach.  Return to clinic if no improvement noted or if symptoms are worsening.   -     azithromycin (Zithromax Z-Clark) 250 MG tablet; Take 2 tablets by mouth on day 1, then 1 tablet daily on days 2-5  Dispense: 6 tablet; Refill: 0  -     guaiFENesin (Mucinex) 600 MG 12 hr tablet; Take 2 tablets by mouth 2 (Two) Times a Day for 7 days.  Dispense: 28 tablet; Refill: 0  -     benzonatate (Tessalon Perles) 100 MG capsule;  Take 1 capsule by mouth 3 (Three) Times a Day As Needed for Cough.  Dispense: 30 capsule; Refill: 0       New Medications Ordered This Visit   Medications    azithromycin (Zithromax Z-Clark) 250 MG tablet     Sig: Take 2 tablets by mouth on day 1, then 1 tablet daily on days 2-5     Dispense:  6 tablet     Refill:  0    guaiFENesin (Mucinex) 600 MG 12 hr tablet     Sig: Take 2 tablets by mouth 2 (Two) Times a Day for 7 days.     Dispense:  28 tablet     Refill:  0    benzonatate (Tessalon Perles) 100 MG capsule     Sig: Take 1 capsule by mouth 3 (Three) Times a Day As Needed for Cough.     Dispense:  30 capsule     Refill:  0     Follow Up   Return if symptoms worsen or fail to improve.    Patient was given instructions and counseling regarding his condition or for health maintenance advice. Please see specific information pulled into the AVS if appropriate.       This document has been electronically signed by GREG Gracia   December 2, 2024 12:04 EST    Dictated Utilizing Dragon Dictation: Part of this note may be an electronic transcription/translation of spoken language to printed text using the Dragon Dictation System.

## 2024-12-10 ENCOUNTER — DISEASE STATE MANAGEMENT VISIT (OUTPATIENT)
Dept: PHARMACY | Facility: HOSPITAL | Age: 54
End: 2024-12-10
Payer: COMMERCIAL

## 2024-12-10 NOTE — PROGRESS NOTES
"   [facility]ication [facility]ty Pharmacy Patient Management Program  Lipid Management Program - PCSK9i follow up Assessment     Jaquan Mckay is a 54 y.o. male referred by their provider, Tiera Valenzuela, to the Hyperlipidemia Patient Management program offered by [facility] for Lipid Management.  Jaquan Mckay is  treated for ASCVD and hyperlipidemia, and currently takes crestor 40 mg.  In the past, Pt has tried lipitor 40 mg, zocor 10 mg, pravastatin 80 mg and is not statin intolerant. The patient denies any allergies to latex.       A follow-up outreach was conducted, including assessment of continued therapy appropriateness, medication adherence, and side effect incidence and management for Repatha. Patient comes to clinic for injections and has not missed any doses.  Repatha was started on 10/29/24, and patient reports tolerating the medication well with no side effects.    Patient has completed his ankle surgery. States he has no issues with his last repatha injection.    Changes to Insurance Coverage or Financial Support  [employer] [place] (no change)    Relevant Past Medical History and Comorbidities  Relevant medical history and concomitant health conditions were discussed with the patient. The patient's chart has been reviewed for relevant past medical history and comorbid health conditions and updated as necessary.   Past Medical History:   Diagnosis Date    Allergic     Anxiety     Arthritis     Asthma     Body piercing     REPORTS CYLICONE IN EARS    Clotting disorder 2004    had a knee surgery    Coronary artery disease     Depression     DVT (deep venous thrombosis)     RIGHT RIGHT KNEE AFTER SURGERY YEARS AGO IN 2001 OR 2004    Elevated cholesterol     Gastric ulcer     GERD (gastroesophageal reflux disease)     H/O migraine     Headache     Heart attack     REPORTS \"LIGHT HEART ATTACK A LONG TIME AGO\"  \"EARLY 90'S\"    History of " "seizures     REPORTS LAST EPISODE WAS AROUND 1995.    Hostility     Hyperlipidemia     Hypertension     Knee pain, acute     Left    Low back pain     Lyme disease     Migraine     MRSA (methicillin resistant Staphylococcus aureus)     REPORTS LAST TESTED + 2004. WAS TREATED HE REPORTS.  RIGHT ARM, RIGHT KNEE.    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     Obesity     Poor historian     [place] spotted fever     Seizures     Sleep apnea     Tattoo     Wears glasses      Social History     Socioeconomic History    Marital status:      Spouse name: Becca    Number of children: 2    Years of education: 12   Tobacco Use    Smoking status: Every Day     Current packs/day: 1.00     Average packs/day: 1 pack/day for 17.8 years (17.8 ttl pk-yrs)     Types: Cigars, Cigarettes     Start date: 4/11/2022     Passive exposure: Current    Smokeless tobacco: Never   Vaping Use    Vaping status: Never Used   Substance and Sexual Activity    Alcohol use: No    Drug use: No    Sexual activity: Defer     Partners: Female     Birth control/protection: None          Hospitalizations and Urgent Care Since Last Assessment  ED Visits, Admissions, or Hospitalizations: none  Urgent Office Visits: none    Allergies  Known allergies and reactions were discussed with the patient. The patient's chart has been reviewed for allergy information and updated as necessary.   Allergies   Allergen Reactions    Ciprofloxacin Anaphylaxis and Hives    Miralax [Polyethylene Glycol] Itching and Rash    Mobic [Meloxicam] Other (See Comments)     Pt states, \"It make my feet and hands go numb and I can't hardly walk.\"     Paxil [Paroxetine Hcl] Shortness Of Breath     Chest pain     Peanut-Containing Drug Products Anaphylaxis    Penicillins Anaphylaxis    Pristiq [Desvenlafaxine Succinate Er] Dizziness    Sulfa Antibiotics Anaphylaxis, Itching and Rash    Doxycycline Hives    Fish-Derived Products Hives    Isosorbide Nitrate Rash     Rash, hives, had to use " inhaler.     Lyrica [Pregabalin] Hives    Movantik [Naloxegol] Rash    Seroquel [Quetiapine] Hives and Rash    Trulance [Plecanatide] Hives    Buspar [Buspirone] Rash    Clarithromycin Rash    Clindamycin/Lincomycin Rash    Codeine Rash    Contrast Dye (Echo Or Unknown Ct/Mr) Itching and Rash    Diltiazem Rash    Flomax [Tamsulosin] Hives    Gabapentin Rash    Iodinated Contrast Media Itching and Rash    Keflex [Cephalexin] Rash    Levocetirizine Rash    Linzess [Linaclotide] Rash    Metoprolol Rash    Prednisone Rash and Other (See Comments)     Face, feet, and legs go completely numb per patient    Robitussin Cough+ Chest Max St [Dextromethorphan-Guaifenesin] Itching    Shrimp (Diagnostic) Rash    Spironolactone Rash    Viibryd [Vilazodone Hcl] Itching and Rash    Zoloft [Sertraline Hcl] Hives and Itching          Relevant Laboratory Values  Relevant laboratory values were discussed with the patient. The following specialty medication dose adjustment(s) are recommended: none    Lab Results   Component Value Date    GLUCOSE 128 (H) 10/17/2024    CALCIUM 8.9 10/17/2024     10/17/2024    K 3.9 10/17/2024    CO2 24.0 10/17/2024     10/17/2024    BUN 12 10/17/2024    CREATININE 1.12 10/17/2024    EGFRIFAFRI  08/26/2016      Comment:      <15 Indicative of kidney failure.    EGFRIFNONA 66 02/02/2022    BCR 10.7 10/17/2024    ANIONGAP 10.0 10/17/2024     Lab Results   Component Value Date    CHOL 215 (H) 10/17/2024    CHLPL 232 (H) 04/05/2016    TRIG 98 10/17/2024    HDL 66 (H) 10/17/2024     (H) 10/17/2024       Current Medication List  This medication list has been reviewed with the patient and evaluated for any interactions or necessary modifications/recommendations, and updated to include all prescription medications, OTC medications, and supplements the patient is currently taking.  This list reflects what is contained in the patient's profile, which has also been marked as reviewed to communicate  to other providers it is the most up to date version of the patient's current medication therapy.     Current Outpatient Medications:     albuterol sulfate  (90 Base) MCG/ACT inhaler, Inhale 2 puffs by mouth every 4 hours as needed., Disp: 18 g, Rfl: 3    Alcohol Swabs (Alcohol Wipes) 70 % pads, 1 each 2 (Two) Times a Day., Disp: 100 each, Rfl: 5    aspirin (Aspirin EC Adult Low Dose) 81 MG EC tablet, Take 1 tablet by mouth Daily., Disp: 30 tablet, Rfl: 3    aspirin 325 MG tablet, Take 1 tablet by mouth Daily., Disp: 30 tablet, Rfl: 0    azelastine (ASTELIN) 0.1 % nasal spray, , Disp: , Rfl:     azithromycin (Zithromax Z-Clark) 250 MG tablet, Take 2 tablets by mouth on day 1, then 1 tablet daily on days 2-5, Disp: 6 tablet, Rfl: 0    azithromycin (ZITHROMAX) 500 MG tablet, Take 1 tablet by mouth daily as directed., Disp: 6 tablet, Rfl: 0    benzonatate (Tessalon Perles) 100 MG capsule, Take 1 capsule by mouth 3 (Three) Times a Day As Needed for Cough., Disp: 30 capsule, Rfl: 0    buPROPion (WELLBUTRIN) 75 MG tablet, Take 1 tablet by mouth Daily., Disp: 30 tablet, Rfl: 5    clobetasol (TEMOVATE) 0.05 % ointment, Apply 1 application topically to the appropriate area as directed 2 (Two) Times a Day., Disp: 60 g, Rfl: 2    cyclobenzaprine (FLEXERIL) 10 MG tablet, Take 1 tablet by mouth every 6 to 8 Hours as needed for muscle spasms for up to 10 days., Disp: 30 tablet, Rfl: 0    dexlansoprazole (Dexilant) 60 MG capsule, Take 1 capsule by mouth 2 (Two) Times a Day., Disp: 60 capsule, Rfl: 13    dicyclomine (BENTYL) 10 MG capsule, Take 1 capsule by mouth 4 (Four) Times a Day Before Meals & at Bedtime., Disp: 30 capsule, Rfl: 0    Dilantin 100 MG capsule, Take 2 capsules by mouth 2 (Two) Times a Day., Disp: 120 capsule, Rfl: 5    diphenhydrAMINE (Benadryl Allergy) 25 MG tablet, Take 1-2 tablets by mouth Every 8 (Eight) Hours As Needed for Itching (or rash)., Disp: 120 tablet, Rfl: 2    Elastic Bandages & Supports (ACE  Ankle Brace) misc, 1 each Daily., Disp: 1 each, Rfl: 0    EPINEPHrine (EPIPEN) 0.3 MG/0.3ML solution auto-injector injection, Inject Intramuscularly into lateral thigh as needed for treatment of anaphylaxis, then go to the ER or call 911., Disp: 2 each, Rfl: 2    ergocalciferol (ERGOCALCIFEROL) 1.25 MG (15640 UT) capsule, Take 1 capsule by mouth 1 (One) Time Per Week., Disp: 4 capsule, Rfl: 3    Evolocumab (REPATHA) solution auto-injector SureClick injection, Inject 1 mL under the skin into the appropriate area as directed Every 14 (Fourteen) Days., Disp: 2 mL, Rfl: 5    fluticasone (FLONASE) 50 MCG/ACT nasal spray, Use 1 spray in each nostril Twice a day for 30 days, Disp: 16 g, Rfl: 11    fluticasone (Flovent HFA) 110 MCG/ACT inhaler, Inhale 1 puff by mouth 2 (Two) Times a Day., Disp: 12 g, Rfl: 5    fluticasone (FLOVENT HFA) 44 MCG/ACT inhaler, Inhale 1 puff by mouth twice a day., Disp: 10.6 g, Rfl: 5    glucose blood (OneTouch Ultra) test strip, Use as instructed 2 times daily and as needed, Disp: 100 each, Rfl: 3    HYDROcodone-acetaminophen (Norco) 5-325 MG per tablet, Take 1 tablet by mouth Every 6 (Six) Hours As Needed for Moderate Pain., Disp: 12 tablet, Rfl: 0    ibuprofen (ADVIL,MOTRIN) 600 MG tablet, Take 1 tablet by mouth Every 6 (Six) Hours As Needed for Mild Pain., Disp: 90 tablet, Rfl: 1    ibuprofen (ADVIL,MOTRIN) 800 MG tablet, Take 1 tablet by mouth 2 (Two) Times a Day As Needed., Disp: 40 tablet, Rfl: 0    ibuprofen (ADVIL,MOTRIN) 800 MG tablet, Take 1 tablet by mouth Every 6 (Six) Hours As Needed., Disp: 40 tablet, Rfl: 1    ipratropium-albuterol (COMBIVENT RESPIMAT)  MCG/ACT inhaler, Inhale 1 puff by mouth 4 (Four) Times a Day As Needed for Wheezing., Disp: 4 g, Rfl: 0    ipratropium-albuterol (DUO-NEB) 0.5-2.5 mg/3 ml nebulizer, Inhale the contents of 3 mL vial nebulizer every 6 hours as needed., Disp: 360 mL, Rfl: 3    Lancets (OneTouch Delica Plus [id number]) misc, Use as instructed 2  times daily and as needed, Disp: 100 each, Rfl: 3    levocetirizine (XYZAL) 5 MG tablet, Take 1 tablet by mouth in the evening Once a day 30 days, Disp: 30 tablet, Rfl: 11    lisinopril (PRINIVIL,ZESTRIL) 30 MG tablet, Take 1 tablet by mouth Daily for blood pressure., Disp: 90 tablet, Rfl: 1    miconazole (Micatin) 2 % cream, Apply topically to the appropriate area as directed 2 (Two) Times a Day To feet., Disp: 30 g, Rfl: 0    mirtazapine (REMERON) 30 MG tablet, Take 1 & 1/2  tablets by mouth Every Night., Disp: 45 tablet, Rfl: 5    montelukast (SINGULAIR) 10 MG tablet, Take 1 tablet by mouth., Disp: , Rfl:     Morphine (MS CONTIN) 15 MG 12 hr tablet, Take 1 tablet (15 mg total) by mouth every 12 (twelve) hours as needed for severe pain (7-10) for up to 30 days. Max Daily Amount: 30 mg, Disp: 20 tablet, Rfl: 0    multivitamin with minerals tablet tablet, Take 1 tablet by mouth Daily., Disp: 30 tablet, Rfl: 12    ondansetron (Zofran) 4 MG tablet, Take 1 tablet by mouth Every 8 (Eight) Hours As Needed for Nausea., Disp: 20 tablet, Rfl: 0    oxyCODONE (oxyCONTIN) 10 MG 12 hr tablet, Take 1 tablet by mouth Every 12 (Twelve) Hours for 14 days. Max Daily Amount: 20 mg, Disp: 28 tablet, Rfl: 0    oxyCODONE-acetaminophen (PERCOCET) 5-325 MG per tablet, Take 1 tablet by mouth Every 6 (Six) Hours As Needed. Max daily amount: 4 tablets, Disp: 30 tablet, Rfl: 0    rosuvastatin (Crestor) 40 MG tablet, Take 1 tablet by mouth Daily., Disp: 30 tablet, Rfl: 3    sodium chloride 0.65 % nasal spray, Use 1-3 sprays in each nostril Three times a day as needed 30 days, Disp: 44 mL, Rfl: 11    tamsulosin (FLOMAX) 0.4 MG capsule 24 hr capsule, Take 1 capsule by mouth Daily., Disp: 90 capsule, Rfl: 3    temazepam (Restoril) 15 MG capsule, Take 1 capsule by mouth At Night As Needed for Sleep., Disp: 30 capsule, Rfl: 1    tiZANidine (Zanaflex) 4 MG tablet, Take 1 tablet by mouth 3 (Three) Times a Day., Disp: 30 tablet, Rfl: 5    vitamin C  (ASCORBIC ACID) 500 MG tablet, Take 1 tablet by mouth 2 (Two) Times a Day., Disp: 60 tablet, Rfl: 1         Drug Interactions  None with repatha    Adverse Drug Reactions        Plan for ADR Management: n/a    Adherence, Self-Administration, and Current Therapy Problems  Adherence related to the patient's specialty therapy was discussed with the patient. The Adherence segment of this outreach has been reviewed and updated.          Additional Barriers to Patient Self-Administration: patient refuses to administer himself  Methods for Supporting Patient Self-Administration: patient comes to [facility] clinic every 2 weeks for injections    Open Medication Therapy Problems  No medication therapy recommendations to display    Goals of Therapy  Goals related to the patient's specialty therapy were discussed with the patient. The Patient Goals segment of this outreach has been reviewed and updated.   Goals Addressed Today    None     LDL was 136 mg/dl on 3/20/24 and has improved to 132 mg/ml on 10/17/29. This lab work was completed prior to starting repatha therapy    Quality of Life Assessment   Quality of Life related to the patient's enrollment in the patient management program and services provided was discussed with the patient. The QOL segment of this outreach has been reviewed and updated.       Reassessment Plan & Follow-Up  1. Medication Therapy Changes: no changes reported in medications  2. Related Plans, Therapy Recommendations, or Issues to Be Addressed: none     3. Pharmacist to perform regular assessments no more than (6) months from the previous assessment.  Patient will continue regular follow-up with referring provider.   4. Care Coordinator to set up future refill outreaches, coordinate prescription delivery, and escalate clinical questions to pharmacist.  5. Sintia Hollis, pharmacy student, administered injection in to the back of his LEFT arm- Lot:[id number], exp: 02/28/2027    Attestation      I attest the  patient was actively involved in and has agreed to the above plan of care.  If the prescribed therapy is at any point deemed not appropriate based on the current or future assessments, a consultation will be initiated with the patient's specialty care provider to determine the best course of action. The revised plan of therapy will be documented along with any required assessments and/or additional patient education provided.     Patient to follow up for injections in Clinic every 2 weeks    Melinda Case, [place]  12/10/2024  15:02 EST

## 2024-12-12 ENCOUNTER — OFFICE VISIT (OUTPATIENT)
Dept: FAMILY MEDICINE CLINIC | Facility: CLINIC | Age: 54
End: 2024-12-12
Payer: COMMERCIAL

## 2024-12-12 VITALS
HEART RATE: 86 BPM | WEIGHT: 247 LBS | BODY MASS INDEX: 38.77 KG/M2 | DIASTOLIC BLOOD PRESSURE: 80 MMHG | HEIGHT: 67 IN | OXYGEN SATURATION: 98 % | SYSTOLIC BLOOD PRESSURE: 122 MMHG | RESPIRATION RATE: 18 BRPM

## 2024-12-12 DIAGNOSIS — F41.8 DEPRESSION WITH ANXIETY: ICD-10-CM

## 2024-12-12 DIAGNOSIS — Z51.81 ENCOUNTER FOR THERAPEUTIC DRUG MONITORING: ICD-10-CM

## 2024-12-12 DIAGNOSIS — Z98.890 S/P PERONEAL TENDON REPAIR: ICD-10-CM

## 2024-12-12 DIAGNOSIS — Z98.890 S/P ACHILLES TENDON REPAIR: ICD-10-CM

## 2024-12-12 DIAGNOSIS — K59.03 DRUG-INDUCED CONSTIPATION: ICD-10-CM

## 2024-12-12 DIAGNOSIS — I10 ESSENTIAL HYPERTENSION: ICD-10-CM

## 2024-12-12 DIAGNOSIS — J30.1 SEASONAL ALLERGIC RHINITIS DUE TO POLLEN: ICD-10-CM

## 2024-12-12 DIAGNOSIS — Z09 ENCOUNTER FOR EXAMINATION FOLLOWING TREATMENT AT HOSPITAL: Primary | ICD-10-CM

## 2024-12-12 DIAGNOSIS — Z79.899 ENCOUNTER FOR LONG-TERM (CURRENT) USE OF MEDICATIONS: ICD-10-CM

## 2024-12-12 PROCEDURE — 3074F SYST BP LT 130 MM HG: CPT | Performed by: NURSE PRACTITIONER

## 2024-12-12 PROCEDURE — 99214 OFFICE O/P EST MOD 30 MIN: CPT | Performed by: NURSE PRACTITIONER

## 2024-12-12 PROCEDURE — 1159F MED LIST DOCD IN RCRD: CPT | Performed by: NURSE PRACTITIONER

## 2024-12-12 PROCEDURE — 1160F RVW MEDS BY RX/DR IN RCRD: CPT | Performed by: NURSE PRACTITIONER

## 2024-12-12 PROCEDURE — 1125F AMNT PAIN NOTED PAIN PRSNT: CPT | Performed by: NURSE PRACTITIONER

## 2024-12-12 PROCEDURE — 3079F DIAST BP 80-89 MM HG: CPT | Performed by: NURSE PRACTITIONER

## 2024-12-12 RX ORDER — DOCUSATE CALCIUM 240 MG
240 CAPSULE ORAL 2 TIMES DAILY PRN
Qty: 60 CAPSULE | Refills: 2 | Status: SHIPPED | OUTPATIENT
Start: 2024-12-12

## 2024-12-12 RX ORDER — CLONIDINE HYDROCHLORIDE 0.1 MG/1
0.1 TABLET ORAL 2 TIMES DAILY PRN
Qty: 60 TABLET | Refills: 1 | Status: SHIPPED | OUTPATIENT
Start: 2024-12-12

## 2024-12-12 RX ORDER — BACLOFEN 20 MG/1
20 TABLET ORAL 3 TIMES DAILY
Qty: 15 TABLET | Refills: 0 | Status: SHIPPED | OUTPATIENT
Start: 2024-12-12

## 2024-12-12 NOTE — PROGRESS NOTES
"Subjective   Jaquan Mckay is a 54 y.o. male.     Chief Complaint   Patient presents with    Hospital Follow Up Visit       History of Present Illness     Foot pain-patient is s/p repair of the peroneal tendon of the right foot.  The surgery was performed December for, 2024 by Dr. Florez at [facility].  He also had a ankle arthroscopy with a synovectomy of the right foot and repair of the right Achilles tendon during the surgery.  He was seen back the following day for unbearable postop pain.  Patient was increased and his p.o. pain medicine and added OxyContin 10 mg XR.  Patient is currently in a cast. He was advised to continue strict nonweightbearing.  He has a knee scooter.  He returned back to the ER on December 10, 2024 for a fall in which he subsequently reinjured his surgical foot.  He had an appt today for follow up at Dr Florez's.  He reports that he was started on a zpak due to some concerns about his wound becoming infected.   He was provided Oxycodone for his pain relief.  He will follow up again on 12/26.  He will get imaging and re-casting.  He is interested in home health for help with ADL's.    BP-has been elevated intermittently due to pain.  He reports today at his appt at ortho his DBP was >110.    He is currently normal at the office.  His meter at home is broken but he thinks he will be able to obtain one.   He reports that his ortho felt \"I needed a extra pill till I got healed\". He has not had CP in \"couple of weeks or longer\".   Mood/Anxiety-continues to do well on Wellbutrin.  He has not had any side effects.  He continues Restoril for insomia.  He also continues remeron 30 mg.  He feels that his depression is doing at this time.    Allergies-ongoing.  He continues immunotherapy at the allergy specialist.  He is also on Xyzal 5 mg.  He continues his Flovent inhalers as well as as needed albuterol when needed.  He has not had any injection site reactions.      The following portions of " "the patient's history were reviewed and updated as appropriate: CC, ROS, allergies, current medications, past family history, past medical history, past social history, past surgical history and problem list.    Review of Systems   Constitutional:  Positive for fatigue. Negative for appetite change, unexpected weight gain and unexpected weight loss.   HENT:  Negative for congestion, ear pain, postnasal drip, rhinorrhea, sore throat, swollen glands, trouble swallowing and voice change.    Eyes:  Negative for pain and visual disturbance.   Respiratory:  Negative for cough, chest tightness, shortness of breath and wheezing.    Cardiovascular:  Negative for chest pain, palpitations and leg swelling.   Gastrointestinal:  Negative for abdominal pain, blood in stool, constipation, diarrhea, nausea and indigestion.   Genitourinary:  Negative for dysuria, hematuria and urgency.   Musculoskeletal:  Positive for arthralgias, back pain, gait problem and myalgias. Negative for joint swelling.        Using knee scooter for mobility.  Cast on his right foot.  Toes warms with evidence of normal circulation   Skin:  Negative for color change and skin lesions.   Allergic/Immunologic: Negative.    Neurological:  Negative for dizziness, numbness and headache.   Hematological: Negative.    Psychiatric/Behavioral:  Negative for dysphoric mood, sleep disturbance and suicidal ideas. The patient is not nervous/anxious.    All other systems reviewed and are negative.      Objective     /80   Pulse 86   Resp 18   Ht 170.2 cm (67\")   Wt 112 kg (247 lb)   SpO2 98%   BMI 38.69 kg/m²     Physical Exam  Vitals reviewed.   Constitutional:       General: He is not in acute distress.     Appearance: He is well-developed. He is obese. He is not diaphoretic.   HENT:      Head: Normocephalic and atraumatic.      Jaw: No tenderness.      Right Ear: Hearing, tympanic membrane, ear canal and external ear normal.      Left Ear: Hearing, tympanic " membrane, ear canal and external ear normal.      Nose: Nose normal. No nasal tenderness or congestion.      Right Sinus: No maxillary sinus tenderness or frontal sinus tenderness.      Left Sinus: No maxillary sinus tenderness or frontal sinus tenderness.      Mouth/Throat:      Lips: Pink.      Mouth: Mucous membranes are moist.      Pharynx: Oropharynx is clear. Uvula midline.   Eyes:      General: Lids are normal. No scleral icterus.     Extraocular Movements:      Right eye: Normal extraocular motion and no nystagmus.      Left eye: Normal extraocular motion and no nystagmus.      Conjunctiva/sclera: Conjunctivae normal.      Pupils: Pupils are equal, round, and reactive to light.   Neck:      Thyroid: No thyromegaly or thyroid tenderness.      Vascular: No carotid bruit or JVD.      Trachea: No tracheal tenderness.   Cardiovascular:      Rate and Rhythm: Normal rate and regular rhythm.      Pulses:           Dorsalis pedis pulses are 2+ on the right side and 2+ on the left side.        Posterior tibial pulses are 2+ on the right side and 2+ on the left side.      Heart sounds: Normal heart sounds, S1 normal and S2 normal. No murmur heard.  Pulmonary:      Effort: Pulmonary effort is normal. No accessory muscle usage, prolonged expiration or respiratory distress.      Breath sounds: Normal breath sounds.   Chest:      Chest wall: No tenderness.   Abdominal:      General: Bowel sounds are normal. There is no distension.      Palpations: Abdomen is soft. There is no hepatomegaly, splenomegaly or mass.      Tenderness: There is no abdominal tenderness.   Musculoskeletal:         General: Tenderness present.      Cervical back: Normal range of motion and neck supple.      Lumbar back: Tenderness present.      Right lower leg: No edema.      Left lower leg: No edema.      Right ankle: Tenderness present. Decreased range of motion.      Left ankle: Tenderness present. Decreased range of motion.      Right foot:  Decreased range of motion. Normal capillary refill. Swelling and tenderness present.      Left foot: Decreased range of motion. Normal capillary refill. Tenderness present.      Comments: No muscular atrophy or flaccidity.  Left foot with brace for support  Right foot in cast.  Toes warm.  ROM in toes WNL.  Mild edema   Lymphadenopathy:      Head:      Right side of head: No submental or submandibular adenopathy.      Left side of head: No submental or submandibular adenopathy.      Cervical: No cervical adenopathy.      Right cervical: No superficial cervical adenopathy.     Left cervical: No superficial cervical adenopathy.   Skin:     General: Skin is warm and dry.      Capillary Refill: Capillary refill takes less than 2 seconds.      Coloration: Skin is not jaundiced or pale.      Findings: No erythema.      Nails: There is no clubbing.   Neurological:      Mental Status: He is alert and oriented to person, place, and time.      Cranial Nerves: No cranial nerve deficit or facial asymmetry.      Sensory: No sensory deficit.      Motor: No weakness, tremor, atrophy or abnormal muscle tone.      Coordination: Coordination normal.      Gait: Gait abnormal (moderate antalgia).      Deep Tendon Reflexes: Reflexes are normal and symmetric.   Psychiatric:         Attention and Perception: He is attentive.         Mood and Affect: Mood normal. Mood is not anxious or depressed.         Speech: Speech normal.         Behavior: Behavior normal. Behavior is cooperative.         Thought Content: Thought content normal.         Cognition and Memory: Cognition normal.         Judgment: Judgment normal.         Diagnoses and all orders for this visit:    1. Encounter for examination following treatment at hospital (Primary)    2. S/P peroneal tendon repair  Comments:  continue under the care of orthopedic  Orders:  -     Ambulatory Referral to Home Health  -     baclofen (LIORESAL) 20 MG tablet; Take 1 tablet by mouth 3 (Three)  Times a Day.  Dispense: 15 tablet; Refill: 0    3. S/P Achilles tendon repair  Comments:  continue non weight bearing status.   continue under the care of ortho  Orders:  -     baclofen (LIORESAL) 20 MG tablet; Take 1 tablet by mouth 3 (Three) Times a Day.  Dispense: 15 tablet; Refill: 0    4. Drug-induced constipation  Comments:  will start docusate  push fluids  Orders:  -     docusate calcium (SURFAK) 240 MG capsule; Take 1 capsule by mouth 2 (Two) Times a Day As Needed for Constipation.  Dispense: 60 capsule; Refill: 2    5. Essential hypertension  Comments:  continue Lisinopril 30 mg.  PRN clonidine ordered for elevated BP  Orders:  -     cloNIDine (Catapres) 0.1 MG tablet; Take 1 tablet by mouth 2 (Two) Times a Day As Needed for High Blood Pressure (if bp more than 140/90).  Dispense: 60 tablet; Refill: 1    6. Depression with anxiety  Comments:  continue Wellbutrin, Remeron and restoril  continue to work on stress reduction    7. Seasonal allergic rhinitis due to pollen  Comments:  continue immunotherapy  continue medication as directed.       Understands disease processes and need for medications.  Understands reasons for urgent and emergent care.  Patient (& family) verbalized agreement for treatment plan.   Emotional support and active listening provided.  Patient provided time to verbalize feelings.    CHAVEZ/LINN reviewed today and consistent.  Will refill prescribed controlled medication today.  Patient is aware they cannot receive narcotics from any other provider except if under care of pain management or speciality clinic.  Risk and benefits of medication use has been reviewed.  History and physical exam exhibit continued safe and appropriate use of controlled substances.  The patient is aware of the potential for addiction and dependence.  This patient has been made aware of the appropriate use of such medications, including potential risk of somnolence, limited ability to drive and / or work  safely, and potential for overdose.    It has also been made clear that these medications are for use by this patient only, without concomitant use of alcohol or other substances unless prescribed/advised by medical provider.  Patient understands they may be subject to UDS and pill counts at random.    Patient considered to be moderate risk for addiction due to use of multiple controlled medications.  Patient understands and accepts these risks.  Patient need for medication will be reassessed at each visit.  Doses will be adjusted according to patient need and findings.    Goal of [place]: Patient will not have any adverse reactions of medication.  Patient will have improvement in sleep hygiene/pattern with use of Restoril as needed as directed.    Continue under care of ortho for pain management.  Reminded to stagger dose sleep and pain medications.    CHAVEZ Patient Controlled Substance Report (from 12/13/2023 to 12/12/2024)    Dispensed  Strength Quantity Days Supply Provider Pharmacy   12/06/2024 Oxycodone/Acetaminophen 325MG/5MG 30 each 8 TALA STEWART [facility]...   11/27/2024 Hydrocodone Bitartrate[facility]Ac [facility]MG [facility]   11/15/2024 Temazepam 15MG 30 each 30 [facility]...   10/09/2024 [facility]   08/29/2024 Pregabalin 75MG 60 each 30 TALA STEWART [facility] [place]        RTC 1 month, sooner if needed.             This document has been electronically signed by:  BALDOMERO Thao FNP-C Dragon disclaimer:  Part of this note may be an electronic transcription/translation of spoken language to printed text using the Dragon Dictation System.

## 2024-12-27 ENCOUNTER — HOSPITAL ENCOUNTER (EMERGENCY)
Facility: HOSPITAL | Age: 54
Discharge: HOME | End: 2024-12-27
Attending: EMERGENCY MEDICINE
Payer: COMMERCIAL

## 2024-12-27 ENCOUNTER — DISEASE STATE MANAGEMENT VISIT (OUTPATIENT)
Dept: PHARMACY | Facility: HOSPITAL | Age: 54
End: 2024-12-27
Payer: COMMERCIAL

## 2024-12-27 VITALS
RESPIRATION RATE: 14 BRPM | OXYGEN SATURATION: 100 % | WEIGHT: 238 LBS | DIASTOLIC BLOOD PRESSURE: 86 MMHG | HEIGHT: 67 IN | HEART RATE: 80 BPM | SYSTOLIC BLOOD PRESSURE: 150 MMHG | TEMPERATURE: 97.8 F | BODY MASS INDEX: 37.35 KG/M2

## 2024-12-27 DIAGNOSIS — Z47.89 PROBLEM WITH FIBERGLASS CAST: Primary | ICD-10-CM

## 2024-12-27 PROCEDURE — 99283 EMERGENCY DEPT VISIT LOW MDM: CPT

## 2024-12-27 NOTE — PROGRESS NOTES
"   Medication Management Clinic/ Specialty Pharmacy Patient Management Program  Lipid Management Program - PCSK9i follow up Assessment     Jaquan Mckay is a 54 y.o. male referred by their provider, Tiera Valenzuela, to the Hyperlipidemia Patient Management program offered by [facility] for Lipid Management.  Jaquan Mckay is  treated for ASCVD and hyperlipidemia, and currently takes crestor 40 mg.  In the past, Pt has tried lipitor 40 mg, zocor 10 mg, pravastatin 80 mg and is not statin intolerant. The patient denies any allergies to latex.       A follow-up outreach was conducted, including assessment of continued therapy appropriateness, medication adherence, and side effect incidence and management for Repatha. Patient comes to clinic for injections and has not missed any doses.  Repatha was started on 10/29/24, and patient reports tolerating the medication well with no side effects.    Patient has completed his ankle surgery. States he has no issues with his last repatha injection.    Changes to Insurance Coverage or Financial Support  [employer] [place] (no change)    Relevant Past Medical History and Comorbidities  Relevant medical history and concomitant health conditions were discussed with the patient. The patient's chart has been reviewed for relevant past medical history and comorbid health conditions and updated as necessary.   Past Medical History:   Diagnosis Date    Allergic     Anxiety     Arthritis     Asthma     Body piercing     REPORTS CYLICONE IN EARS    Clotting disorder 2004    had a knee surgery    Coronary artery disease     Depression     DVT (deep venous thrombosis)     RIGHT RIGHT KNEE AFTER SURGERY YEARS AGO IN 2001 OR 2004    Elevated cholesterol     Gastric ulcer     GERD (gastroesophageal reflux disease)     H/O migraine     Headache     Heart attack     REPORTS \"LIGHT HEART ATTACK A LONG TIME AGO\"  \"EARLY 90'S\"    History of " "seizures     REPORTS LAST EPISODE WAS AROUND 1995.    Hostility     Hyperlipidemia     Hypertension     Knee pain, acute     Left    Low back pain     Lyme disease     Migraine     MRSA (methicillin resistant Staphylococcus aureus)     REPORTS LAST TESTED + 2004. WAS TREATED HE REPORTS.  RIGHT ARM, RIGHT KNEE.    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     Obesity     Poor historian     [place] spotted fever     Seizures     Sleep apnea     Tattoo     Wears glasses      Social History     Socioeconomic History    Marital status:      Spouse name: Becca    Number of children: 2    Years of education: 12   Tobacco Use    Smoking status: Every Day     Current packs/day: 1.00     Average packs/day: 1 pack/day for 17.8 years (17.8 ttl pk-yrs)     Types: Cigars, Cigarettes     Start date: 4/11/2022     Passive exposure: Current    Smokeless tobacco: Never   Vaping Use    Vaping status: Never Used   Substance and Sexual Activity    Alcohol use: No    Drug use: No    Sexual activity: Defer     Partners: Female     Birth control/protection: None     Problem list reviewed by Kimberley Dinh RP on 12/27/2024 at  3:05 PM    Hospitalizations and Urgent Care Since Last Assessment  ED Visits, Admissions, or Hospitalizations: none  Urgent Office Visits: none    Allergies  Known allergies and reactions were discussed with the patient. The patient's chart has been reviewed for allergy information and updated as necessary.   Allergies   Allergen Reactions    Ciprofloxacin Anaphylaxis and Hives    Miralax [Polyethylene Glycol] Itching and Rash    Mobic [Meloxicam] Other (See Comments)     Pt states, \"It make my feet and hands go numb and I can't hardly walk.\"     Paxil [Paroxetine Hcl] Shortness Of Breath     Chest pain     Peanut-Containing Drug Products Anaphylaxis    Penicillins Anaphylaxis    Pristiq [Desvenlafaxine Succinate Er] Dizziness    Sulfa Antibiotics Anaphylaxis, Itching and Rash    Doxycycline Hives    Fish-Derived " Products Hives    Isosorbide Nitrate Rash     Rash, hives, had to use inhaler.     Lyrica [Pregabalin] Hives    Movantik [Naloxegol] Rash    Seroquel [Quetiapine] Hives and Rash    Trulance [Plecanatide] Hives    Buspar [Buspirone] Rash    Clarithromycin Rash    Clindamycin/Lincomycin Rash    Codeine Rash    Contrast Dye (Echo Or Unknown Ct/Mr) Itching and Rash    Diltiazem Rash    Flomax [Tamsulosin] Hives    Gabapentin Rash    Iodinated Contrast Media Itching and Rash    Keflex [Cephalexin] Rash    Levocetirizine Rash    Linzess [Linaclotide] Rash    Metoprolol Rash    Prednisone Rash and Other (See Comments)     Face, feet, and legs go completely numb per patient    Robitussin Cough+ Chest Max St [Dextromethorphan-Guaifenesin] Itching    Shrimp (Diagnostic) Rash    Spironolactone Rash    Viibryd [Vilazodone Hcl] Itching and Rash    Zoloft [Sertraline Hcl] Hives and Itching     Allergies reviewed by Kimberley Dinh [facility] on 12/27/2024 at  3:05 PM    Relevant Laboratory Values  Relevant laboratory values were discussed with the patient. The following specialty medication dose adjustment(s) are recommended: none    Lab Results   Component Value Date    GLUCOSE 128 (H) 10/17/2024    CALCIUM 8.9 10/17/2024     10/17/2024    K 3.9 10/17/2024    CO2 24.0 10/17/2024     10/17/2024    BUN 12 10/17/2024    CREATININE 1.12 10/17/2024    EGFRIFAFRI  08/26/2016      Comment:      <15 Indicative of kidney failure.    EGFRIFNONA 66 02/02/2022    BCR 10.7 10/17/2024    ANIONGAP 10.0 10/17/2024     Lab Results   Component Value Date    CHOL 215 (H) 10/17/2024    CHLPL 232 (H) 04/05/2016    TRIG 98 10/17/2024    HDL 66 (H) 10/17/2024     (H) 10/17/2024       Current Medication List  This medication list has been reviewed with the patient and evaluated for any interactions or necessary modifications/recommendations, and updated to include all prescription medications, OTC medications, and supplements the patient  is currently taking.  This list reflects what is contained in the patient's profile, which has also been marked as reviewed to communicate to other providers it is the most up to date version of the patient's current medication therapy.     Current Outpatient Medications:     albuterol sulfate  (90 Base) MCG/ACT inhaler, Inhale 2 puffs by mouth every 4 hours as needed., Disp: 18 g, Rfl: 3    Alcohol Swabs (Alcohol Wipes) 70 % pads, 1 each 2 (Two) Times a Day., Disp: 100 each, Rfl: 5    aspirin (Aspirin EC Adult Low Dose) 81 MG EC tablet, Take 1 tablet by mouth Daily., Disp: 30 tablet, Rfl: 3    aspirin 325 MG tablet, Take 1 tablet by mouth Daily., Disp: 30 tablet, Rfl: 0    azelastine (ASTELIN) 0.1 % nasal spray, , Disp: , Rfl:     azithromycin (ZITHROMAX) 500 MG tablet, Take 1 tablet by mouth daily as directed., Disp: 6 tablet, Rfl: 0    baclofen (LIORESAL) 20 MG tablet, Take 1 tablet by mouth 3 (Three) Times a Day., Disp: 15 tablet, Rfl: 0    benzonatate (Tessalon Perles) 100 MG capsule, Take 1 capsule by mouth 3 (Three) Times a Day As Needed for Cough., Disp: 30 capsule, Rfl: 0    buPROPion (WELLBUTRIN) 75 MG tablet, Take 1 tablet by mouth Daily., Disp: 30 tablet, Rfl: 5    clobetasol (TEMOVATE) 0.05 % ointment, Apply 1 application topically to the appropriate area as directed 2 (Two) Times a Day., Disp: 60 g, Rfl: 2    cloNIDine (Catapres) 0.1 MG tablet, Take 1 tablet by mouth 2 (Two) Times a Day As Needed for High Blood Pressure (if bp more than 140/90)., Disp: 60 tablet, Rfl: 1    cyclobenzaprine (FLEXERIL) 10 MG tablet, Take 1 tablet by mouth every 6 to 8 Hours as needed for muscle spasms for up to 10 days., Disp: 30 tablet, Rfl: 0    dexlansoprazole (Dexilant) 60 MG capsule, Take 1 capsule by mouth 2 (Two) Times a Day., Disp: 60 capsule, Rfl: 13    dicyclomine (BENTYL) 10 MG capsule, Take 1 capsule by mouth 4 (Four) Times a Day Before Meals & at Bedtime., Disp: 30 capsule, Rfl: 0    Dilantin 100 MG  capsule, Take 2 capsules by mouth 2 (Two) Times a Day., Disp: 120 capsule, Rfl: 5    diphenhydrAMINE (Benadryl Allergy) 25 MG tablet, Take 1-2 tablets by mouth Every 8 (Eight) Hours As Needed for Itching (or rash)., Disp: 120 tablet, Rfl: 2    docusate calcium (SURFAK) 240 MG capsule, Take 1 capsule by mouth 2 (Two) Times a Day As Needed for Constipation., Disp: 60 capsule, Rfl: 2    Elastic Bandages & Supports (ACE Ankle Brace) misc, 1 each Daily., Disp: 1 each, Rfl: 0    EPINEPHrine (EPIPEN) 0.3 MG/0.3ML solution auto-injector injection, Inject Intramuscularly into lateral thigh as needed for treatment of anaphylaxis, then go to the ER or call 911., Disp: 2 each, Rfl: 2    ergocalciferol (ERGOCALCIFEROL) 1.25 MG (63360 UT) capsule, Take 1 capsule by mouth 1 (One) Time Per Week., Disp: 4 capsule, Rfl: 3    Evolocumab (REPATHA) solution auto-injector SureClick injection, Inject 1 mL under the skin into the appropriate area as directed Every 14 (Fourteen) Days., Disp: 2 mL, Rfl: 5    fluticasone (FLONASE) 50 MCG/ACT nasal spray, Use 1 spray in each nostril Twice a day for 30 days, Disp: 16 g, Rfl: 11    fluticasone (Flovent HFA) 110 MCG/ACT inhaler, Inhale 1 puff by mouth 2 (Two) Times a Day., Disp: 12 g, Rfl: 5    fluticasone (FLOVENT HFA) 44 MCG/ACT inhaler, Inhale 1 puff by mouth twice a day., Disp: 10.6 g, Rfl: 5    glucose blood (OneTouch Ultra) test strip, Use as instructed 2 times daily and as needed, Disp: 100 each, Rfl: 3    HYDROcodone-acetaminophen (NORCO)  MG per tablet, Take 1 tablet by mouth every 6 (six) hours as needed for pain for up to 10 days. Max Daily Amount: 4 tablets, Disp: 35 tablet, Rfl: 0    HYDROcodone-acetaminophen (Norco) 5-325 MG per tablet, Take 1 tablet by mouth Every 6 (Six) Hours As Needed for Moderate Pain., Disp: 12 tablet, Rfl: 0    ibuprofen (ADVIL,MOTRIN) 600 MG tablet, Take 1 tablet by mouth Every 6 (Six) Hours As Needed for Mild Pain., Disp: 90 tablet, Rfl: 1     ibuprofen (ADVIL,MOTRIN) 800 MG tablet, Take 1 tablet by mouth 2 (Two) Times a Day As Needed., Disp: 40 tablet, Rfl: 0    ibuprofen (ADVIL,MOTRIN) 800 MG tablet, Take 1 tablet by mouth Every 6 (Six) Hours As Needed., Disp: 40 tablet, Rfl: 1    ipratropium-albuterol (COMBIVENT RESPIMAT)  MCG/ACT inhaler, Inhale 1 puff by mouth 4 (Four) Times a Day As Needed for Wheezing., Disp: 4 g, Rfl: 0    ipratropium-albuterol (DUO-NEB) 0.5-2.5 mg/3 ml nebulizer, Inhale the contents of 3 mL vial nebulizer every 6 hours as needed., Disp: 360 mL, Rfl: 3    Lancets (OneTouch Delica Plus [id number]) misc, Use as instructed 2 times daily and as needed, Disp: 100 each, Rfl: 3    levocetirizine (XYZAL) 5 MG tablet, Take 1 tablet by mouth in the evening Once a day 30 days, Disp: 30 tablet, Rfl: 11    lisinopril (PRINIVIL,ZESTRIL) 30 MG tablet, Take 1 tablet by mouth Daily for blood pressure., Disp: 90 tablet, Rfl: 1    miconazole (Micatin) 2 % cream, Apply topically to the appropriate area as directed 2 (Two) Times a Day To feet., Disp: 30 g, Rfl: 0    mirtazapine (REMERON) 30 MG tablet, Take 1 & 1/2  tablets by mouth Every Night., Disp: 45 tablet, Rfl: 5    montelukast (SINGULAIR) 10 MG tablet, Take 1 tablet by mouth., Disp: , Rfl:     Morphine (MS CONTIN) 15 MG 12 hr tablet, Take 1 tablet (15 mg total) by mouth every 12 (twelve) hours as needed for severe pain (7-10) for up to 30 days. Max Daily Amount: 30 mg, Disp: 20 tablet, Rfl: 0    multivitamin with minerals tablet tablet, Take 1 tablet by mouth Daily., Disp: 30 tablet, Rfl: 12    ondansetron (Zofran) 4 MG tablet, Take 1 tablet by mouth Every 8 (Eight) Hours As Needed for Nausea., Disp: 20 tablet, Rfl: 0    oxyCODONE-acetaminophen (PERCOCET) 5-325 MG per tablet, Take 1 tablet by mouth Every 6 (Six) Hours As Needed. Max daily amount: 4 tablets, Disp: 30 tablet, Rfl: 0    rosuvastatin (Crestor) 40 MG tablet, Take 1 tablet by mouth Daily., Disp: 30 tablet, Rfl: 3    sodium  chloride 0.65 % nasal spray, Use 1-3 sprays in each nostril Three times a day as needed 30 days, Disp: 44 mL, Rfl: 11    tamsulosin (FLOMAX) 0.4 MG capsule 24 hr capsule, Take 1 capsule by mouth Daily., Disp: 90 capsule, Rfl: 3    temazepam (Restoril) 15 MG capsule, Take 1 capsule by mouth At Night As Needed for Sleep., Disp: 30 capsule, Rfl: 1    tiZANidine (Zanaflex) 4 MG tablet, Take 1 tablet by mouth 3 (Three) Times a Day., Disp: 30 tablet, Rfl: 5    vitamin C (ASCORBIC ACID) 500 MG tablet, Take 1 tablet by mouth 2 (Two) Times a Day., Disp: 60 tablet, Rfl: 1    Medicines reviewed by Kimberley Dinh RP on 12/27/2024 at  3:05 PM    Drug Interactions  None with repatha    Adverse Drug Reactions        Plan for ADR Management: n/a    Adherence, Self-Administration, and Current Therapy Problems  Adherence related to the patient's specialty therapy was discussed with the patient. The Adherence segment of this outreach has been reviewed and updated.          Additional Barriers to Patient Self-Administration: patient refuses to administer himself  Methods for Supporting Patient Self-Administration: patient comes to [facility] every 2 weeks for injections    Open Medication Therapy Problems  No medication therapy recommendations to display    Goals of Therapy  Goals related to the patient's specialty therapy were discussed with the patient. The Patient Goals segment of this outreach has been reviewed and updated.   Goals Addressed Today        Specialty Pharmacy General Goal      LDL reduction          LDL was 136 mg/dl on 3/20/24 and has improved to 132 mg/ml on 10/17/29. This lab work was completed prior to starting repatha therapy    Quality of Life Assessment   Quality of Life related to the patient's enrollment in the patient management program and services provided was discussed with the patient. The QOL segment of this outreach has been reviewed and updated.       Reassessment Plan & Follow-Up  1. Medication  Therapy Changes: no changes reported in medications  2. Related Plans, Therapy Recommendations, or Issues to Be Addressed: none     3. Pharmacist to perform regular assessments no more than (6) months from the previous assessment.  Patient will continue regular follow-up with referring provider.   4. Care Coordinator to set up future refill outreaches, coordinate prescription delivery, and escalate clinical questions to pharmacist.  5. Joyce Emerson, pharmacy student, administered injection in to the back of his RIGHT arm   Lot:[id number], exp: 02/28/2027    Attestation      I attest the patient was actively involved in and has agreed to the above plan of care.  If the prescribed therapy is at any point deemed not appropriate based on the current or future assessments, a consultation will be initiated with the patient's specialty care provider to determine the best course of action. The revised plan of therapy will be documented along with any required assessments and/or additional patient education provided.     Patient to follow up for injections in Clinic every 2 weeks    Kimberley Dinh RPH  12/27/2024  15:19 EST

## 2024-12-30 ENCOUNTER — PATIENT OUTREACH (OUTPATIENT)
Dept: CASE MANAGEMENT | Facility: OTHER | Age: 54
End: 2024-12-30
Payer: COMMERCIAL

## 2024-12-30 NOTE — OUTREACH NOTE
AMBULATORY CASE MANAGEMENT NOTE    Names and Relationships of Patient/Support Persons: Contact: Jaquan Mckay; Relationship: Self -     Patient Outreach    RN-ACM brief outreach with patient.  Patient had an ED visit at [facility] 12/27/24.  Clinical impression is noted as problem with fiberglass cast.  Patient was treated and discharged to home to follow as outpatient.  Patient has next ortho appointment scheduled.    Introduction and purpose of outreach provided. Patient was busy at the time of the call and unable to have an extended conversation.   Multiple appointments are scheduled over the upcoming month.     Future Appointments         Provider Department Center    1/7/2025 1:30 PM Virginie Garcia APRN [facility] CARDIOLOGY COR    1/10/2025 3:30 PM COR [facility] CLINIC [facility] DSM CLINIC COR    1/14/2025 12:30 PM Tiera Valenzuela APRN [facility] FAMILY MEDICINE COR    1/27/2025 12:00 PM Tiera Valenzuela APRN [facility]    11/21/2025 2:15 PM Chavo Jewell PA-C [facility] GASTROENTEROLOGY & UROLOGY [facility]  Ambulatory Case Management    12/30/2024, 14:57 EST

## 2025-01-01 NOTE — ED PROVIDER NOTES
"Subjective   History of Present Illness  Patient is a 54 year old male. He presents to the ED for cast removal to E. He reports he had a surgery done by Dr. Florez. He reports cast is too tight. Office advised him to come to the ED and have it removed.        Review of Systems   Constitutional: Negative.  Negative for fever.   HENT: Negative.     Respiratory: Negative.     Cardiovascular: Negative.  Negative for chest pain.   Gastrointestinal: Negative.  Negative for abdominal pain.   Endocrine: Negative.    Genitourinary: Negative.  Negative for dysuria.   Skin: Negative.    Neurological: Negative.    Psychiatric/Behavioral: Negative.     All other systems reviewed and are negative.      Past Medical History:   Diagnosis Date    Allergic     Anxiety     Arthritis     Asthma     Body piercing     REPORTS CYLICONE IN EARS    Clotting disorder 2004    had a knee surgery    Coronary artery disease     Depression     DVT (deep venous thrombosis)     RIGHT RIGHT KNEE AFTER SURGERY YEARS AGO IN 2001 OR 2004    Elevated cholesterol     Gastric ulcer     GERD (gastroesophageal reflux disease)     H/O migraine     Headache     Heart attack     REPORTS \"LIGHT HEART ATTACK A LONG TIME AGO\"  \"EARLY 90'S\"    History of seizures     REPORTS LAST EPISODE WAS AROUND 1995.    Hostility     Hyperlipidemia     Hypertension     Knee pain, acute     Left    Low back pain     Lyme disease     Migraine     MRSA (methicillin resistant Staphylococcus aureus)     REPORTS LAST TESTED + 2004. WAS TREATED HE REPORTS.  RIGHT ARM, RIGHT KNEE.    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     Obesity     Poor historian     Carl [place] spotted fever     Seizures     Sleep apnea     Tattoo     Wears glasses        Allergies   Allergen Reactions    Ciprofloxacin Anaphylaxis and Hives    Miralax [Polyethylene Glycol] Itching and Rash    Mobic [Meloxicam] Other (See Comments)     Pt states, \"It make my feet and hands go numb and I can't hardly walk.\"     Paxil [Paroxetine " Hcl] Shortness Of Breath     Chest pain     Peanut-Containing Drug Products Anaphylaxis    Penicillins Anaphylaxis    Pristiq [Desvenlafaxine Succinate Er] Dizziness    Sulfa Antibiotics Anaphylaxis, Itching and Rash    Doxycycline Hives    Fish-Derived Products Hives    Isosorbide Nitrate Rash     Rash, hives, had to use inhaler.     Lyrica [Pregabalin] Hives    Movantik [Naloxegol] Rash    Seroquel [Quetiapine] Hives and Rash    Trulance [Plecanatide] Hives    Buspar [Buspirone] Rash    Clarithromycin Rash    Clindamycin/Lincomycin Rash    Codeine Rash    Contrast Dye (Echo Or Unknown Ct/Mr) Itching and Rash    Diltiazem Rash    Flomax [Tamsulosin] Hives    Gabapentin Rash    Iodinated Contrast Media Itching and Rash    Keflex [Cephalexin] Rash    Levocetirizine Rash    Linzess [Linaclotide] Rash    Metoprolol Rash    Prednisone Rash and Other (See Comments)     Face, feet, and legs go completely numb per patient    Robitussin Cough+ Chest Max St [Dextromethorphan-Guaifenesin] Itching    Shrimp (Diagnostic) Rash    Spironolactone Rash    Viibryd [Vilazodone Hcl] Itching and Rash    Zoloft [Sertraline Hcl] Hives and Itching       Past Surgical History:   Procedure Laterality Date    ABDOMINAL SURGERY      BACK SURGERY      BRAIN SURGERY  1986    Tumor removal     CARDIAC CATHETERIZATION N/A 9/28/2018    Procedure: Left Heart Cath;  Surgeon: Leandro Daily MD;  Location: [facility] CATH INVASIVE LOCATION;  Service: Cardiology    CHOLECYSTECTOMY      COLONOSCOPY      COLONOSCOPY N/A 8/2/2021    Procedure: COLONOSCOPY FOR SCREENING;  Surgeon: Irving Azar MD;  Location: [facility] OR;  Service: Gastroenterology;  Laterality: N/A;    CYST REMOVAL      pilonidal cyst    ELBOW EPICONDYLECTOMY Right 7/23/2020    Procedure: LATERAL EPICONDYLAR RELEASE;  Surgeon: Jose Ruiz MD;  Location: [facility] OR;  Service: Orthopedics;  Laterality: Right;    ENDOSCOPY      ENDOSCOPY N/A 8/2/2021    Procedure:  ESOPHAGOGASTRODUODENOSCOPY WITH BIOPSY;  Surgeon: Irving Azar MD;  Location: [facility] OR;  Service: Gastroenterology;  Laterality: N/A;  esophageal dilatation to 20mm    FRACTURE SURGERY Right     elbow    KNEE ARTHROSCOPY Left 10/20/2017    Procedure: Diagnostic arthroscopy left knee with chondroplasty;  Surgeon: Marco Aguirre MD;  Location: [facility] OR;  Service:     KNEE ARTHROSCOPY Left 1/11/2021    Procedure: KNEE DIAGNOSTIC ARTHROSCOPY WITH  CHONDROPLASTY patella, femoral and medial;  Surgeon: Raul Eagle MD;  Location: [facility] OR;  Service: Orthopedics;  Laterality: Left;    KNEE SURGERY Right     MOUTH SURGERY      FULL MOUTH EXTRACTION    OTHER SURGICAL HISTORY      REPORTS 7 TICKS REMOVED FROM RIGHT ARM IN 2001 OR 2002    TENNIS ELBOW RELEASE Right 7/23/2020    Procedure: RIGHT TENNIS ELBOW RELEASE;  Surgeon: Jose Ruiz MD;  Location: [facility] OR;  Service: Orthopedics;  Laterality: Right;    TUMOR EXCISION      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       Family History   Problem Relation Age of Onset    Diabetes Mother     Hypertension Mother     Stroke Mother     Diabetes Father     Skin cancer Father     Hypertension Father     Heart attack Father     Diabetes Brother     Hypertension Brother     Heart disease Maternal Aunt     Heart disease Maternal Uncle     Heart disease Paternal Aunt     Heart disease Paternal Uncle     Heart disease Maternal Grandmother     Heart disease Maternal Grandfather     Heart disease Paternal Grandmother     Heart disease Paternal Grandfather        Social History     Socioeconomic History    Marital status:      Spouse name: Becca    Number of children: 2    Years of education: 12   Tobacco Use    Smoking status: Every Day     Current packs/day: 1.00     Average packs/day: 1 pack/day for 17.8 years (17.8 ttl pk-yrs)     Types: Cigars, Cigarettes     Start date: 4/11/2022     Passive exposure: Current    Smokeless tobacco:  Never   Vaping Use    Vaping status: Never Used   Substance and Sexual Activity    Alcohol use: No    Drug use: No    Sexual activity: Defer     Partners: Female     Birth control/protection: None           Objective   Physical Exam  Vitals and nursing note reviewed.   Constitutional:       General: He is not in acute distress.     Appearance: He is well-developed. He is not diaphoretic.   HENT:      Head: Normocephalic and atraumatic.      Right Ear: External ear normal.      Left Ear: External ear normal.      Nose: Nose normal.   Eyes:      Conjunctiva/sclera: Conjunctivae normal.      Pupils: Pupils are equal, round, and reactive to light.   Neck:      Vascular: No JVD.      Trachea: No tracheal deviation.   Cardiovascular:      Rate and Rhythm: Normal rate and regular rhythm.      Heart sounds: Normal heart sounds. No murmur heard.  Pulmonary:      Effort: Pulmonary effort is normal. No respiratory distress.      Breath sounds: Normal breath sounds. No wheezing.   Abdominal:      General: Bowel sounds are normal.      Palpations: Abdomen is soft.      Tenderness: There is no abdominal tenderness.   Musculoskeletal:         General: No deformity. Normal range of motion.      Cervical back: Normal range of motion and neck supple.   Skin:     General: Skin is warm and dry.      Coloration: Skin is not pale.      Findings: No erythema or rash.   Neurological:      Mental Status: He is alert and oriented to person, place, and time.      Cranial Nerves: No cranial nerve deficit.   Psychiatric:         Behavior: Behavior normal.         Thought Content: Thought content normal.         Procedures           ED Course                                                       Medical Decision Making  Patient is a 54 year old male. He presents to the ED for cast removal to [facility]. He reports he had a surgery done by Dr. Florez. He reports cast is too tight. Office advised him to come to the ED and have it removed.      -Short leg  posterior OCL placed. Pt is to be non weight bearing. Follow up with Dr Florez.    Problems Addressed:  Problem with fiberglass cast: acute illness or injury        Final diagnoses:   Problem with fiberglass cast       ED Disposition  ED Disposition       ED Disposition   Discharge    Condition   Stable    Comment   --               Khurram Florez, DPM  [address] [place] [address] [phone]    Call in 2 days           Medication List      No changes were made to your prescriptions during this visit.            Trudy Hernández, APRN  12/31/24 [id number]

## 2025-01-09 ENCOUNTER — DISEASE STATE MANAGEMENT VISIT (OUTPATIENT)
Dept: PHARMACY | Facility: HOSPITAL | Age: 55
End: 2025-01-09
Payer: COMMERCIAL

## 2025-01-09 NOTE — PROGRESS NOTES
"   Medication Management Clinic/ Specialty Pharmacy Patient Management Program  Lipid Management Program - PCSK9i follow up Assessment     Jaquan Mckay is a 54 y.o. male referred by their provider, Tiera Valenzuela, to the Hyperlipidemia Patient Management program offered by [facility]ication Management [facility] & Specialty Pharmacy for Lipid Management.  Jaquan Mckay is  treated for ASCVD and hyperlipidemia, and currently takes crestor 40 mg.  In the past, Pt has tried lipitor 40 mg, zocor 10 mg, pravastatin 80 mg and is not statin intolerant. The patient denies any allergies to latex.       A follow-up outreach was conducted, including assessment of continued therapy appropriateness, medication adherence, and side effect incidence and management for Repatha. Patient comes to clinic for injections and has not missed any doses.  Repatha was started on 10/29/24, and patient reports tolerating the medication well with no side effects.    Patient has completed his ankle surgery. States he has no issues with his last repatha injection.    Changes to Insurance Coverage or Financial Support  [employer] [place] (no change)    Relevant Past Medical History and Comorbidities  Relevant medical history and concomitant health conditions were discussed with the patient. The patient's chart has been reviewed for relevant past medical history and comorbid health conditions and updated as necessary.   Past Medical History:   Diagnosis Date    Allergic     Anxiety     Arthritis     Asthma     Body piercing     REPORTS CYLICONE IN EARS    Clotting disorder 2004    had a knee surgery    Coronary artery disease     Depression     DVT (deep venous thrombosis)     RIGHT RIGHT KNEE AFTER SURGERY YEARS AGO IN 2001 OR 2004    Elevated cholesterol     Gastric ulcer     GERD (gastroesophageal reflux disease)     H/O migraine     Headache     Heart attack     REPORTS \"LIGHT HEART ATTACK A LONG TIME AGO\"  \"EARLY 90'S\"    History of " "seizures     REPORTS LAST EPISODE WAS AROUND 1995.    Hostility     Hyperlipidemia     Hypertension     Knee pain, acute     Left    Low back pain     Lyme disease     Migraine     MRSA (methicillin resistant Staphylococcus aureus)     REPORTS LAST TESTED + 2004. WAS TREATED HE REPORTS.  RIGHT ARM, RIGHT KNEE.    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     Obesity     Poor historian     [place] spotted fever     Seizures     Sleep apnea     Tattoo     Wears glasses      Social History     Socioeconomic History    Marital status:      Spouse name: Becca    Number of children: 2    Years of education: 12   Tobacco Use    Smoking status: Every Day     Current packs/day: 1.00     Average packs/day: 1 pack/day for 17.8 years (17.8 ttl pk-yrs)     Types: Cigars, Cigarettes     Start date: 4/11/2022     Passive exposure: Current    Smokeless tobacco: Never   Vaping Use    Vaping status: Never Used   Substance and Sexual Activity    Alcohol use: No    Drug use: No    Sexual activity: Defer     Partners: Female     Birth control/protection: None          Hospitalizations and Urgent Care Since Last Assessment  ED Visits, Admissions, or Hospitalizations: none  Urgent Office Visits: none    Allergies  Known allergies and reactions were discussed with the patient. The patient's chart has been reviewed for allergy information and updated as necessary.   Allergies   Allergen Reactions    Ciprofloxacin Anaphylaxis and Hives    Miralax [Polyethylene Glycol] Itching and Rash    Mobic [Meloxicam] Other (See Comments)     Pt states, \"It make my feet and hands go numb and I can't hardly walk.\"     Paxil [Paroxetine Hcl] Shortness Of Breath     Chest pain     Peanut-Containing Drug Products Anaphylaxis    Penicillins Anaphylaxis    Pristiq [Desvenlafaxine Succinate Er] Dizziness    Sulfa Antibiotics Anaphylaxis, Itching and Rash    Doxycycline Hives    Fish-Derived Products Hives    Isosorbide Nitrate Rash     Rash, hives, had to use " inhaler.     Lyrica [Pregabalin] Hives    Movantik [Naloxegol] Rash    Seroquel [Quetiapine] Hives and Rash    Trulance [Plecanatide] Hives    Buspar [Buspirone] Rash    Clarithromycin Rash    Clindamycin/Lincomycin Rash    Codeine Rash    Contrast Dye (Echo Or Unknown Ct/Mr) Itching and Rash    Diltiazem Rash    Flomax [Tamsulosin] Hives    Gabapentin Rash    Iodinated Contrast Media Itching and Rash    Keflex [Cephalexin] Rash    Levocetirizine Rash    Linzess [Linaclotide] Rash    Metoprolol Rash    Prednisone Rash and Other (See Comments)     Face, feet, and legs go completely numb per patient    Robitussin Cough+ Chest Max St [Dextromethorphan-Guaifenesin] Itching    Shrimp (Diagnostic) Rash    Spironolactone Rash    Viibryd [Vilazodone Hcl] Itching and Rash    Zoloft [Sertraline Hcl] Hives and Itching          Relevant Laboratory Values  Relevant laboratory values were discussed with the patient. The following specialty medication dose adjustment(s) are recommended: none    Lab Results   Component Value Date    GLUCOSE 128 (H) 10/17/2024    CALCIUM 8.9 10/17/2024     10/17/2024    K 3.9 10/17/2024    CO2 24.0 10/17/2024     10/17/2024    BUN 12 10/17/2024    CREATININE 1.12 10/17/2024    EGFRIFAFRI  08/26/2016      Comment:      <15 Indicative of kidney failure.    EGFRIFNONA 66 02/02/2022    BCR 10.7 10/17/2024    ANIONGAP 10.0 10/17/2024     Lab Results   Component Value Date    CHOL 215 (H) 10/17/2024    CHLPL 232 (H) 04/05/2016    TRIG 98 10/17/2024    HDL 66 (H) 10/17/2024     (H) 10/17/2024       Current Medication List  This medication list has been reviewed with the patient and evaluated for any interactions or necessary modifications/recommendations, and updated to include all prescription medications, OTC medications, and supplements the patient is currently taking.  This list reflects what is contained in the patient's profile, which has also been marked as reviewed to communicate  to other providers it is the most up to date version of the patient's current medication therapy.     Current Outpatient Medications:     albuterol sulfate  (90 Base) MCG/ACT inhaler, Inhale 2 puffs by mouth every 4 hours as needed., Disp: 18 g, Rfl: 3    Alcohol Swabs (Alcohol Wipes) 70 % pads, 1 each 2 (Two) Times a Day., Disp: 100 each, Rfl: 5    aspirin (Aspirin EC Adult Low Dose) 81 MG EC tablet, Take 1 tablet by mouth Daily., Disp: 30 tablet, Rfl: 3    aspirin 325 MG tablet, Take 1 tablet by mouth Daily., Disp: 30 tablet, Rfl: 0    azelastine (ASTELIN) 0.1 % nasal spray, , Disp: , Rfl:     azithromycin (ZITHROMAX) 500 MG tablet, Take 1 tablet by mouth daily as directed., Disp: 6 tablet, Rfl: 0    baclofen (LIORESAL) 20 MG tablet, Take 1 tablet by mouth 3 (Three) Times a Day., Disp: 15 tablet, Rfl: 0    benzonatate (Tessalon Perles) 100 MG capsule, Take 1 capsule by mouth 3 (Three) Times a Day As Needed for Cough., Disp: 30 capsule, Rfl: 0    buPROPion (WELLBUTRIN) 75 MG tablet, Take 1 tablet by mouth Daily., Disp: 30 tablet, Rfl: 5    clobetasol (TEMOVATE) 0.05 % ointment, Apply 1 application topically to the appropriate area as directed 2 (Two) Times a Day., Disp: 60 g, Rfl: 2    cloNIDine (Catapres) 0.1 MG tablet, Take 1 tablet by mouth 2 (Two) Times a Day As Needed for High Blood Pressure (if bp more than 140/90)., Disp: 60 tablet, Rfl: 1    cyclobenzaprine (FLEXERIL) 10 MG tablet, Take 1 tablet by mouth every 6 to 8 Hours as needed for muscle spasms for up to 10 days., Disp: 30 tablet, Rfl: 0    dexlansoprazole (Dexilant) 60 MG capsule, Take 1 capsule by mouth 2 (Two) Times a Day., Disp: 60 capsule, Rfl: 13    dicyclomine (BENTYL) 10 MG capsule, Take 1 capsule by mouth 4 (Four) Times a Day Before Meals & at Bedtime., Disp: 30 capsule, Rfl: 0    Dilantin 100 MG capsule, Take 2 capsules by mouth 2 (Two) Times a Day., Disp: 120 capsule, Rfl: 5    diphenhydrAMINE (Benadryl Allergy) 25 MG tablet, Take  1-2 tablets by mouth Every 8 (Eight) Hours As Needed for Itching (or rash)., Disp: 120 tablet, Rfl: 2    docusate calcium (SURFAK) 240 MG capsule, Take 1 capsule by mouth 2 (Two) Times a Day As Needed for Constipation., Disp: 60 capsule, Rfl: 2    Elastic Bandages & Supports (ACE Ankle Brace) misc, 1 each Daily., Disp: 1 each, Rfl: 0    EPINEPHrine (EPIPEN) 0.3 MG/0.3ML solution auto-injector injection, Inject Intramuscularly into lateral thigh as needed for treatment of anaphylaxis, then go to the ER or call 911., Disp: 2 each, Rfl: 2    ergocalciferol (ERGOCALCIFEROL) 1.25 MG (65429 UT) capsule, Take 1 capsule by mouth 1 (One) Time Per Week., Disp: 4 capsule, Rfl: 3    Evolocumab (REPATHA) solution auto-injector SureClick injection, Inject 1 mL under the skin into the appropriate area as directed Every 14 (Fourteen) Days., Disp: 2 mL, Rfl: 5    fluticasone (FLONASE) 50 MCG/ACT nasal spray, Use 1 spray in each nostril Twice a day for 30 days, Disp: 16 g, Rfl: 11    fluticasone (Flovent HFA) 110 MCG/ACT inhaler, Inhale 1 puff by mouth 2 (Two) Times a Day., Disp: 12 g, Rfl: 5    fluticasone (FLOVENT HFA) 44 MCG/ACT inhaler, Inhale 1 puff by mouth twice a day., Disp: 10.6 g, Rfl: 5    glucose blood (OneTouch Ultra) test strip, Use as instructed 2 times daily and as needed, Disp: 100 each, Rfl: 3    HYDROcodone-acetaminophen (NORCO)  MG per tablet, Take 1 tablet by mouth every 6 (six) hours as needed for pain for up to 10 days. Max Daily Amount: 4 tablets, Disp: 35 tablet, Rfl: 0    HYDROcodone-acetaminophen (Norco) 5-325 MG per tablet, Take 1 tablet by mouth Every 6 (Six) Hours As Needed for Moderate Pain., Disp: 12 tablet, Rfl: 0    ibuprofen (ADVIL,MOTRIN) 600 MG tablet, Take 1 tablet by mouth Every 6 (Six) Hours As Needed for Mild Pain., Disp: 90 tablet, Rfl: 1    ibuprofen (ADVIL,MOTRIN) 800 MG tablet, Take 1 tablet by mouth 2 (Two) Times a Day As Needed., Disp: 40 tablet, Rfl: 0    ibuprofen (ADVIL,MOTRIN)  800 MG tablet, Take 1 tablet by mouth Every 6 (Six) Hours As Needed., Disp: 40 tablet, Rfl: 1    ipratropium-albuterol (COMBIVENT RESPIMAT)  MCG/ACT inhaler, Inhale 1 puff by mouth 4 (Four) Times a Day As Needed for Wheezing., Disp: 4 g, Rfl: 0    ipratropium-albuterol (DUO-NEB) 0.5-2.5 mg/3 ml nebulizer, Inhale the contents of 3 mL vial nebulizer every 6 hours as needed., Disp: 360 mL, Rfl: 3    Lancets (OneTouch Delica Plus [id number]) misc, Use as instructed 2 times daily and as needed, Disp: 100 each, Rfl: 3    levocetirizine (XYZAL) 5 MG tablet, Take 1 tablet by mouth in the evening Once a day 30 days, Disp: 30 tablet, Rfl: 11    lisinopril (PRINIVIL,ZESTRIL) 30 MG tablet, Take 1 tablet by mouth Daily for blood pressure., Disp: 90 tablet, Rfl: 1    miconazole (Micatin) 2 % cream, Apply topically to the appropriate area as directed 2 (Two) Times a Day To feet., Disp: 30 g, Rfl: 0    mirtazapine (REMERON) 30 MG tablet, Take 1 & 1/2  tablets by mouth Every Night., Disp: 45 tablet, Rfl: 5    montelukast (SINGULAIR) 10 MG tablet, Take 1 tablet by mouth., Disp: , Rfl:     Morphine (MS CONTIN) 15 MG 12 hr tablet, Take 1 tablet (15 mg total) by mouth every 12 (twelve) hours as needed for severe pain (7-10) for up to 30 days. Max Daily Amount: 30 mg, Disp: 20 tablet, Rfl: 0    multivitamin with minerals tablet tablet, Take 1 tablet by mouth Daily., Disp: 30 tablet, Rfl: 12    ondansetron (Zofran) 4 MG tablet, Take 1 tablet by mouth Every 8 (Eight) Hours As Needed for Nausea., Disp: 20 tablet, Rfl: 0    oxyCODONE-acetaminophen (PERCOCET) 5-325 MG per tablet, Take 1 tablet by mouth Every 6 (Six) Hours As Needed. Max daily amount: 4 tablets, Disp: 30 tablet, Rfl: 0    rosuvastatin (Crestor) 40 MG tablet, Take 1 tablet by mouth Daily., Disp: 30 tablet, Rfl: 3    sodium chloride 0.65 % nasal spray, Use 1-3 sprays in each nostril Three times a day as needed 30 days, Disp: 44 mL, Rfl: 11    tamsulosin (FLOMAX) 0.4 MG  capsule 24 hr capsule, Take 1 capsule by mouth Daily., Disp: 90 capsule, Rfl: 3    temazepam (Restoril) 15 MG capsule, Take 1 capsule by mouth At Night As Needed for Sleep., Disp: 30 capsule, Rfl: 1    tiZANidine (Zanaflex) 4 MG tablet, Take 1 tablet by mouth 3 (Three) Times a Day., Disp: 30 tablet, Rfl: 5    vitamin C (ASCORBIC ACID) 500 MG tablet, Take 1 tablet by mouth 2 (Two) Times a Day., Disp: 60 tablet, Rfl: 1         Drug Interactions  None with repatha    Adverse Drug Reactions        Plan for ADR Management: n/a    Adherence, Self-Administration, and Current Therapy Problems  Adherence related to the patient's specialty therapy was discussed with the patient. The Adherence segment of this outreach has been reviewed and updated.          Additional Barriers to Patient Self-Administration: patient refuses to administer himself  Methods for Supporting Patient Self-Administration: patient comes to [facility] clinic every 2 weeks for injections    Open Medication Therapy Problems  No medication therapy recommendations to display    Goals of Therapy  Goals related to the patient's specialty therapy were discussed with the patient. The Patient Goals segment of this outreach has been reviewed and updated.   Goals Addressed Today    None     LDL was 136 mg/dl on 3/20/24 and has improved to 132 mg/ml on 10/17/29. This lab work was completed prior to starting repatha therapy    Quality of Life Assessment   Quality of Life related to the patient's enrollment in the patient management program and services provided was discussed with the patient. The QOL segment of this outreach has been reviewed and updated.       Reassessment Plan & Follow-Up  1. Medication Therapy Changes: no changes reported in medications  2. Related Plans, Therapy Recommendations, or Issues to Be Addressed: none     3. Pharmacist to perform regular assessments no more than (6) months from the previous assessment.  Patient will continue regular follow-up  with referring provider.   4. Care Coordinator to set up future refill outreaches, coordinate prescription delivery, and escalate clinical questions to pharmacist.  5. Misty Rojas, pharmacy student, administered injection in to the back of his LEFT arm       Attestation      I attest the patient was actively involved in and has agreed to the above plan of care.  If the prescribed therapy is at any point deemed not appropriate based on the current or future assessments, a consultation will be initiated with the patient's specialty care provider to determine the best course of action. The revised plan of therapy will be documented along with any required assessments and/or additional patient education provided.     Patient to follow up for injections in Clinic every 2 weeks    Grecia Herron, PharmD  1/9/2025  13:00 EST

## 2025-01-10 ENCOUNTER — TELEPHONE (OUTPATIENT)
Dept: FAMILY MEDICINE CLINIC | Facility: CLINIC | Age: 55
End: 2025-01-10
Payer: COMMERCIAL

## 2025-01-10 DIAGNOSIS — R56.9 SEIZURES: ICD-10-CM

## 2025-01-10 RX ORDER — PHENYTOIN SODIUM 100 MG/1
200 CAPSULE, EXTENDED RELEASE ORAL 2 TIMES DAILY
Qty: 120 CAPSULE | Refills: 5 | Status: SHIPPED | OUTPATIENT
Start: 2025-01-10

## 2025-01-10 NOTE — TELEPHONE ENCOUNTER
Current pharmacy is unable to get this in stock. [facility].

## 2025-01-14 ENCOUNTER — OFFICE VISIT (OUTPATIENT)
Dept: FAMILY MEDICINE CLINIC | Facility: CLINIC | Age: 55
End: 2025-01-14
Payer: COMMERCIAL

## 2025-01-14 VITALS
RESPIRATION RATE: 16 BRPM | SYSTOLIC BLOOD PRESSURE: 122 MMHG | HEIGHT: 67 IN | BODY MASS INDEX: 37.35 KG/M2 | HEART RATE: 86 BPM | OXYGEN SATURATION: 96 % | DIASTOLIC BLOOD PRESSURE: 76 MMHG | WEIGHT: 238 LBS

## 2025-01-14 DIAGNOSIS — F41.8 DEPRESSION WITH ANXIETY: ICD-10-CM

## 2025-01-14 DIAGNOSIS — Z98.890 S/P PERONEAL TENDON REPAIR: ICD-10-CM

## 2025-01-14 DIAGNOSIS — I10 ESSENTIAL HYPERTENSION: ICD-10-CM

## 2025-01-14 DIAGNOSIS — E78.2 MIXED HYPERLIPIDEMIA: ICD-10-CM

## 2025-01-14 DIAGNOSIS — F51.04 PSYCHOPHYSIOLOGICAL INSOMNIA: Primary | ICD-10-CM

## 2025-01-14 PROCEDURE — 1159F MED LIST DOCD IN RCRD: CPT | Performed by: NURSE PRACTITIONER

## 2025-01-14 PROCEDURE — 1125F AMNT PAIN NOTED PAIN PRSNT: CPT | Performed by: NURSE PRACTITIONER

## 2025-01-14 PROCEDURE — 1160F RVW MEDS BY RX/DR IN RCRD: CPT | Performed by: NURSE PRACTITIONER

## 2025-01-14 PROCEDURE — 3078F DIAST BP <80 MM HG: CPT | Performed by: NURSE PRACTITIONER

## 2025-01-14 PROCEDURE — 3074F SYST BP LT 130 MM HG: CPT | Performed by: NURSE PRACTITIONER

## 2025-01-14 PROCEDURE — 99214 OFFICE O/P EST MOD 30 MIN: CPT | Performed by: NURSE PRACTITIONER

## 2025-01-14 RX ORDER — TEMAZEPAM 15 MG/1
15 CAPSULE ORAL NIGHTLY PRN
Qty: 30 CAPSULE | Refills: 1 | Status: SHIPPED | OUTPATIENT
Start: 2025-01-14

## 2025-01-14 NOTE — PROGRESS NOTES
"Subjective   Jaquan Mckay is a 54 y.o. male.     Chief Complaint   Patient presents with    Essential hypertension       History of Present Illness     HTN-ongoing.  On Lisinopril 30 mg.  No negative side effects.  Some occasional elevations at home.  He has not had any recent chest pain.    Foot pain-of the right foot/ankle.  Follow up on Friday.  Xray \"showed everything is looking good\".   He reports that nothing was torn from his fall last month.  He was continued on pain medication.  He will follow up again \"next Friday\" for his cast removal and he is hopeful he will get a walking boot.  He has been to the ER x's 2 for emergent cast removal on 12/17 and again on 12/30.  He was having foot numbness and toe discoloration.  He has noted increased dry skin on his heel and great toe of each foot.  He reports that the left heel is cracked mildly.   He has not had any falls since his last visit.    Mental health-on Remeron and Wellbutrin.  He is currently doing well.  He does not have any concerns.  Some anxiety at times but he is stressed that he is not able to be active.    Hyperlipidemia-ongoing.  He is presently on crestor 40 mg and Repatha.  No negative side effects.  Patient denies any negative side effects of cholesterol medication.  No reported myalgia or myopathies.      The following portions of the patient's history were reviewed and updated as appropriate: CC, ROS, allergies, current medications, past family history, past medical history, past social history, past surgical history and problem list.      Review of Systems   Constitutional:  Positive for fatigue. Negative for appetite change, unexpected weight gain and unexpected weight loss.   HENT:  Negative for congestion, ear pain, postnasal drip, rhinorrhea, sore throat, swollen glands, trouble swallowing and voice change.    Eyes:  Negative for pain and visual disturbance.   Respiratory:  Negative for cough, chest tightness, shortness of breath and " "wheezing.    Cardiovascular:  Negative for chest pain, palpitations and leg swelling.   Gastrointestinal:  Negative for abdominal pain, blood in stool, constipation, diarrhea, nausea and indigestion.   Genitourinary:  Negative for dysuria, hematuria and urgency.   Musculoskeletal:  Positive for arthralgias, back pain, gait problem and myalgias. Negative for joint swelling.        Using knee scooter for mobility.    Right foot in cast   Skin:  Negative for color change and skin lesions.   Allergic/Immunologic: Negative.    Neurological:  Negative for dizziness, numbness and headache.   Hematological: Negative.    Psychiatric/Behavioral:  Negative for dysphoric mood, sleep disturbance and suicidal ideas. The patient is not nervous/anxious.    All other systems reviewed and are negative.      Objective     /76   Pulse 86   Resp 16   Ht 170.2 cm (67\")   Wt 108 kg (238 lb)   SpO2 96%   BMI 37.28 kg/m²     Physical Exam  Vitals reviewed.   Constitutional:       General: He is not in acute distress.     Appearance: He is well-developed. He is obese. He is not diaphoretic.   HENT:      Head: Normocephalic and atraumatic.      Jaw: No tenderness.      Right Ear: Hearing, tympanic membrane, ear canal and external ear normal.      Left Ear: Hearing, tympanic membrane, ear canal and external ear normal.      Nose: Nose normal. No nasal tenderness or congestion.      Right Sinus: No maxillary sinus tenderness or frontal sinus tenderness.      Left Sinus: No maxillary sinus tenderness or frontal sinus tenderness.      Mouth/Throat:      Lips: Pink.      Mouth: Mucous membranes are moist.      Pharynx: Oropharynx is clear. Uvula midline.   Eyes:      General: Lids are normal. No scleral icterus.     Extraocular Movements:      Right eye: Normal extraocular motion and no nystagmus.      Left eye: Normal extraocular motion and no nystagmus.      Conjunctiva/sclera: Conjunctivae normal.      Pupils: Pupils are equal, " round, and reactive to light.   Neck:      Thyroid: No thyromegaly or thyroid tenderness.      Vascular: No carotid bruit or JVD.      Trachea: No tracheal tenderness.   Cardiovascular:      Rate and Rhythm: Normal rate and regular rhythm.      Pulses:           Dorsalis pedis pulses are 2+ on the right side and 2+ on the left side.        Posterior tibial pulses are 2+ on the right side and 2+ on the left side.      Heart sounds: Normal heart sounds, S1 normal and S2 normal. No murmur heard.  Pulmonary:      Effort: Pulmonary effort is normal. No accessory muscle usage, prolonged expiration or respiratory distress.      Breath sounds: Normal breath sounds.   Chest:      Chest wall: No tenderness.   Abdominal:      General: Bowel sounds are normal. There is no distension.      Palpations: Abdomen is soft. There is no hepatomegaly, splenomegaly or mass.      Tenderness: There is no abdominal tenderness.   Musculoskeletal:         General: Tenderness present.      Cervical back: Normal range of motion and neck supple.      Lumbar back: Tenderness present.      Right lower leg: No edema.      Left lower leg: No edema.      Right ankle: Tenderness present. Decreased range of motion.      Left ankle: Tenderness present. Decreased range of motion.      Right foot: Decreased range of motion. Normal capillary refill. Swelling and tenderness present.      Left foot: Decreased range of motion. Normal capillary refill. Tenderness present.      Comments: No muscular atrophy or flaccidity.  Left foot with brace for support  Right foot in cast.  Toes warm.  ROM in toes WNL.  Mild edema   Lymphadenopathy:      Head:      Right side of head: No submental or submandibular adenopathy.      Left side of head: No submental or submandibular adenopathy.      Cervical: No cervical adenopathy.      Right cervical: No superficial cervical adenopathy.     Left cervical: No superficial cervical adenopathy.   Skin:     General: Skin is warm  and dry.      Capillary Refill: Capillary refill takes less than 2 seconds.      Coloration: Skin is not jaundiced or pale.      Findings: No erythema.      Nails: There is no clubbing.   Neurological:      Mental Status: He is alert and oriented to person, place, and time.      Cranial Nerves: No cranial nerve deficit or facial asymmetry.      Sensory: No sensory deficit.      Motor: No weakness, tremor, atrophy or abnormal muscle tone.      Coordination: Coordination normal.      Gait: Gait abnormal (moderate antalgia).      Deep Tendon Reflexes: Reflexes are normal and symmetric.   Psychiatric:         Attention and Perception: He is attentive.         Mood and Affect: Mood normal. Mood is not anxious or depressed.         Speech: Speech normal.         Behavior: Behavior normal. Behavior is cooperative.         Thought Content: Thought content normal.         Cognition and Memory: Cognition normal.         Judgment: Judgment normal.         Diagnoses and all orders for this visit:    1. Psychophysiological insomnia (Primary)  Comments:  continue remeron and restoril  Overview:  With depression and anxiety      Orders:  -     temazepam (Restoril) 15 MG capsule; Take 1 capsule by mouth At Night As Needed for Sleep.  Dispense: 30 capsule; Refill: 1    2. S/P peroneal tendon repair  Comments:  continue non weight bearing status.  continue under the care of orthopedic    3. Essential hypertension  Comments:  continue Lisinopril.  continue under the care of Cardiology.   monitor BP at home    4. Depression with anxiety  Comments:  continue remeron and Wellbutrin.  be active as able    5. Mixed hyperlipidemia  Comments:  Continue Crestor and Repatha  encouraged a low cholesterol diet       Understands disease processes and need for medications.  Understands reasons for urgent and emergent care.  Patient (& family) verbalized agreement for treatment plan.   Emotional support and active listening provided.  Patient  provided time to verbalize feelings.    CHAVEZ/LINN reviewed today and consistent.  Will refill prescribed controlled medication today.  Patient is aware they cannot receive narcotics from any other provider except if under care of pain management or speciality clinic.  Risk and benefits of medication use has been reviewed.  History and physical exam exhibit continued safe and appropriate use of controlled substances.  The patient is aware of the potential for addiction and dependence.  This patient has been made aware of the appropriate use of such medications, including potential risk of somnolence, limited ability to drive and / or work safely, and potential for overdose.    It has also been made clear that these medications are for use by this patient only, without concomitant use of alcohol or other substances unless prescribed/advised by medical provider.  Patient understands they may be subject to UDS and pill counts at random.    Patient considered to be moderate risk for addiction due to use of multiple controlled medications.  Patient understands and accepts these risks.  Patient need for medication will be reassessed at each visit.  Doses will be adjusted according to patient need and findings.    Goal of [place]: Patient will not have any adverse reactions of medication.  Patient have reduction in pain symptoms with use of PRN Norco as directed.  Patient will not have any adverse side effects from medication.  Patient will be able to remain active in an outside of home without interference from pain symptoms.  Patient will have improvement in sleep hygiene/pattern with use of Restoril as needed as directed.  CHAVEZ Patient Controlled Substance Report (from 1/15/2024 to 1/14/2025)    Dispensed  Strength Quantity Days Supply Provider Pharmacy   12/27/2024 Hydrocodone Bitartrate/Ac 325MG/10MG 35 each 9 TALA STEWART [facility]...   12/17/2024 Temazepam 15MG 30 each 30 LENNOX ROE [facility]  Outpat[facility]...   12/06/2024 Oxycodone/Acetaminophen 325MG/5MG 30 each 8 TALA STEWART [facility]...   11/27/2024 [facility]rate[facility]   11/15/2024 Temazepam 15MG 30 each 30 [facility]...   10/09/2024 Temazepam 15MG 30 each 30 [facility]...        RTC 1 month, sooner if needed.             This document has been electronically signed by:  BALDOMERO Thao, FNP-C    Dragon disclaimer:  Part of this note may be an electronic transcription/translation of spoken language to printed text using the Dragon Dictation System.

## 2025-01-16 DIAGNOSIS — E66.01 CLASS 2 SEVERE OBESITY DUE TO EXCESS CALORIES WITH SERIOUS COMORBIDITY AND BODY MASS INDEX (BMI) OF 37.0 TO 37.9 IN ADULT: ICD-10-CM

## 2025-01-16 DIAGNOSIS — R56.9 SEIZURES: ICD-10-CM

## 2025-01-16 DIAGNOSIS — K59.03 DRUG-INDUCED CONSTIPATION: ICD-10-CM

## 2025-01-16 DIAGNOSIS — R21 RASH AND OTHER NONSPECIFIC SKIN ERUPTION: ICD-10-CM

## 2025-01-16 DIAGNOSIS — E66.812 CLASS 2 SEVERE OBESITY DUE TO EXCESS CALORIES WITH SERIOUS COMORBIDITY AND BODY MASS INDEX (BMI) OF 37.0 TO 37.9 IN ADULT: ICD-10-CM

## 2025-01-16 DIAGNOSIS — F51.04 PSYCHOPHYSIOLOGICAL INSOMNIA: ICD-10-CM

## 2025-01-16 DIAGNOSIS — R11.0 NAUSEA: ICD-10-CM

## 2025-01-16 DIAGNOSIS — M76.61 ACHILLES TENDINITIS OF RIGHT LOWER EXTREMITY: ICD-10-CM

## 2025-01-16 DIAGNOSIS — F41.8 DEPRESSION WITH ANXIETY: ICD-10-CM

## 2025-01-16 RX ORDER — DEXLANSOPRAZOLE 60 MG/1
60 CAPSULE, DELAYED RELEASE ORAL 2 TIMES DAILY
Qty: 60 CAPSULE | Refills: 5 | Status: SHIPPED | OUTPATIENT
Start: 2025-01-16

## 2025-01-16 RX ORDER — ASCORBIC ACID 500 MG
500 TABLET ORAL 2 TIMES DAILY
Qty: 60 TABLET | Refills: 1 | Status: SHIPPED | OUTPATIENT
Start: 2025-01-16

## 2025-01-16 RX ORDER — LEVOCETIRIZINE DIHYDROCHLORIDE 5 MG/1
TABLET, FILM COATED ORAL
Qty: 30 TABLET | Refills: 11 | Status: SHIPPED | OUTPATIENT
Start: 2025-01-16

## 2025-01-16 RX ORDER — MIRTAZAPINE 30 MG/1
45 TABLET, FILM COATED ORAL NIGHTLY
Qty: 45 TABLET | Refills: 5 | Status: SHIPPED | OUTPATIENT
Start: 2025-01-16

## 2025-01-16 RX ORDER — MULTIVIT-MIN/IRON FUM/FOLIC AC 7.5 MG-4
1 TABLET ORAL DAILY
Qty: 30 TABLET | Refills: 12 | Status: SHIPPED | OUTPATIENT
Start: 2025-01-16

## 2025-01-16 RX ORDER — UBIQUINOL 100 MG
1 CAPSULE ORAL 2 TIMES DAILY
Qty: 60 EACH | Refills: 5 | Status: SHIPPED | OUTPATIENT
Start: 2025-01-16

## 2025-01-16 RX ORDER — ONDANSETRON 4 MG/1
4 TABLET, FILM COATED ORAL EVERY 8 HOURS PRN
Qty: 20 TABLET | Refills: 0 | Status: SHIPPED | OUTPATIENT
Start: 2025-01-16

## 2025-01-16 RX ORDER — EPINEPHRINE 0.3 MG/.3ML
INJECTION SUBCUTANEOUS
Qty: 2 EACH | Refills: 2 | Status: SHIPPED | OUTPATIENT
Start: 2025-01-16

## 2025-01-16 RX ORDER — IPRATROPIUM BROMIDE AND ALBUTEROL SULFATE 2.5; .5 MG/3ML; MG/3ML
SOLUTION RESPIRATORY (INHALATION)
Qty: 360 ML | Refills: 3 | Status: SHIPPED | OUTPATIENT
Start: 2025-01-16

## 2025-01-16 RX ORDER — DIPHENHYDRAMINE HCL 25 MG
25-50 TABLET ORAL EVERY 8 HOURS PRN
Qty: 120 TABLET | Refills: 2 | Status: SHIPPED | OUTPATIENT
Start: 2025-01-16

## 2025-01-16 RX ORDER — LISINOPRIL 30 MG/1
30 TABLET ORAL DAILY
Qty: 90 TABLET | Refills: 1 | Status: SHIPPED | OUTPATIENT
Start: 2025-01-16

## 2025-01-16 RX ORDER — DOCUSATE CALCIUM 240 MG
240 CAPSULE ORAL 2 TIMES DAILY PRN
Qty: 60 CAPSULE | Refills: 2 | Status: SHIPPED | OUTPATIENT
Start: 2025-01-16

## 2025-01-16 RX ORDER — ECHINACEA PURPUREA EXTRACT 125 MG
TABLET ORAL
Qty: 44 ML | Refills: 11 | Status: SHIPPED | OUTPATIENT
Start: 2025-01-16

## 2025-01-16 RX ORDER — BUPROPION HYDROCHLORIDE 75 MG/1
75 TABLET ORAL DAILY
Qty: 30 TABLET | Refills: 5 | Status: SHIPPED | OUTPATIENT
Start: 2025-01-16

## 2025-01-16 RX ORDER — ERGOCALCIFEROL 1.25 MG/1
50000 CAPSULE ORAL WEEKLY
Qty: 4 CAPSULE | Refills: 3 | Status: SHIPPED | OUTPATIENT
Start: 2025-01-16

## 2025-01-16 RX ORDER — PHENYTOIN SODIUM 100 MG/1
200 CAPSULE, EXTENDED RELEASE ORAL 2 TIMES DAILY
Qty: 120 CAPSULE | Refills: 5 | Status: SHIPPED | OUTPATIENT
Start: 2025-01-16

## 2025-01-16 RX ORDER — ROSUVASTATIN CALCIUM 40 MG/1
40 TABLET, COATED ORAL DAILY
Qty: 30 TABLET | Refills: 3 | Status: SHIPPED | OUTPATIENT
Start: 2025-01-16

## 2025-01-17 ENCOUNTER — SPECIALTY PHARMACY (OUTPATIENT)
Dept: PHARMACY | Facility: HOSPITAL | Age: 55
End: 2025-01-17
Payer: COMMERCIAL

## 2025-01-17 ENCOUNTER — TELEPHONE (OUTPATIENT)
Dept: FAMILY MEDICINE CLINIC | Facility: CLINIC | Age: 55
End: 2025-01-17
Payer: COMMERCIAL

## 2025-01-17 NOTE — TELEPHONE ENCOUNTER
----- Message from Tiera Valenzuela sent at 1/16/2025  4:33 PM EST -----  Done  ----- Message -----  From: Lorna Gale MA  Sent: 1/15/2025  11:26 AM EST  To: BALDOMERO Thao    Patient called in and wanted to know if you could go ahead and send all of his prescriptions to [facility] pharmacy.

## 2025-01-17 NOTE — PROGRESS NOTES
Specialty Pharmacy Refill Coordination Note     Jaquan is a 54 y.o. male contacted today regarding refills of  Repatha [employer] specialty medication(s).    Reviewed and verified with patient:       Specialty medication(s) and dose(s) confirmed: yes    Refill Questions      Flowsheet Row Most Recent Value   Changes to allergies? No   Changes to medications? No   New conditions or infections since last clinic visit No   Unplanned office visit, urgent care, ED, or hospital admission in the last 4 weeks  No   How does patient/caregiver feel medication is working? Good   Financial problems or insurance changes  No   Since the previous refill, were any specialty medication doses or scheduled injections missed or delayed?  No   Does this patient require a clinical escalation to a pharmacist? No            Delivery Questions      Flowsheet Row Most Recent Value   Delivery method  at Pharmacy   Delivery address Prescription   Medication(s) being filled and delivered Evolocumab (REPATHA)   Copay verified? Yes   Copay amount $0   Copay form of payment No copayment ($0)   Signature Required No                   Follow-up: 28 day(s)     Ruth Yip, Pharmacy Technician  Specialty Pharmacy Technician

## 2025-01-23 ENCOUNTER — SPECIALTY PHARMACY (OUTPATIENT)
Dept: PHARMACY | Facility: HOSPITAL | Age: 55
End: 2025-01-23
Payer: COMMERCIAL

## 2025-01-23 ENCOUNTER — DISEASE STATE MANAGEMENT VISIT (OUTPATIENT)
Dept: PHARMACY | Facility: HOSPITAL | Age: 55
End: 2025-01-23
Payer: COMMERCIAL

## 2025-01-23 NOTE — PROGRESS NOTES
Medication Management Clinic/ Specialty Pharmacy Patient Management Program  Lipid Management Program - PCSK9i follow up Assessment     Jaquan Mckay is a 54 y.o. male referred by their provider, Tiera Valenzuela, to the Hyperlipidemia Patient Management program offered by [facility] Pharm[facility] for Lipid Management.  Jaquan Mckay is  treated for ASCVD and hyperlipidemia, and currently takes crestor 40 mg.  In the past, Pt has tried lipitor 40 mg, zocor 10 mg, pravastatin 80 mg and is not statin intolerant. The patient denies any allergies to latex.       A follow-up outreach was conducted, including assessment of continued therapy appropriateness, medication adherence, and side effect incidence and management for Repatha. Patient comes to clinic for injections and has not missed any doses.  Repatha was started on 10/29/24, and patient reports tolerating the medication well with no side effects.    Patient did report he received his allergy shots in both arms around 2 hours ago today 1/23/25. He reports his next one is in three weeks. He reports tolerating allergy shot fine. He reports he tolerated last Repatha injection without any issues. He denies any side effects.     Changes to Insurance Coverage or Financial Support  [employer] [place] (no change)    Relevant Past Medical History and Comorbidities  Relevant medical history and concomitant health conditions were discussed with the patient. The patient's chart has been reviewed for relevant past medical history and comorbid health conditions and updated as necessary.   Past Medical History:   Diagnosis Date    Allergic     Anxiety     Arthritis     Asthma     Body piercing     REPORTS CYLICONE IN EARS    Clotting disorder 2004    had a knee surgery    Coronary artery disease     Depression     DVT (deep venous thrombosis)     RIGHT RIGHT KNEE AFTER SURGERY YEARS AGO IN 2001 OR 2004    Elevated cholesterol      "Gastric ulcer     GERD (gastroesophageal reflux disease)     H/O migraine     Headache     Heart attack     REPORTS \"LIGHT HEART ATTACK A LONG TIME AGO\"  \"EARLY 90'S\"    History of seizures     REPORTS LAST EPISODE WAS AROUND 1995.    Hostility     Hyperlipidemia     Hypertension     Knee pain, acute     Left    Low back pain     Lyme disease     Migraine     MRSA (methicillin resistant Staphylococcus aureus)     REPORTS LAST TESTED + 2004. WAS TREATED HE REPORTS.  RIGHT ARM, RIGHT KNEE.    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     Obesity     Poor historian     [place] spotted fever     Seizures     Sleep apnea     Tattoo     Wears glasses      Social History     Socioeconomic History    Marital status:      Spouse name: Becca    Number of children: 2    Years of education: 12   Tobacco Use    Smoking status: Every Day     Current packs/day: 1.00     Average packs/day: 1 pack/day for 17.9 years (17.9 ttl pk-yrs)     Types: Cigars, Cigarettes     Start date: 4/11/2022     Passive exposure: Current    Smokeless tobacco: Never   Vaping Use    Vaping status: Never Used   Substance and Sexual Activity    Alcohol use: No    Drug use: No    Sexual activity: Defer     Partners: Female     Birth control/protection: None     Problem list reviewed by Omaira Schwab [facility] on 1/23/2025 at  2:34 PM    Hospitalizations and Urgent Care Since Last Assessment  ED Visits, Admissions, or Hospitalizations: none  Urgent Office Visits: none    Allergies  Known allergies and reactions were discussed with the patient. The patient's chart has been reviewed for allergy information and updated as necessary.   Allergies   Allergen Reactions    Ciprofloxacin Anaphylaxis and Hives    Miralax [Polyethylene Glycol] Itching and Rash    Mobic [Meloxicam] Other (See Comments)     Pt states, \"It make my feet and hands go numb and I can't hardly walk.\"     Paxil [Paroxetine Hcl] Shortness Of Breath     Chest pain     Peanut-Containing Drug Products " Anaphylaxis    Penicillins Anaphylaxis    Pristiq [Desvenlafaxine Succinate Er] Dizziness    Sulfa Antibiotics Anaphylaxis, Itching and Rash    Doxycycline Hives    Fish-Derived Products Hives    Isosorbide Nitrate Rash     Rash, hives, had to use inhaler.     Lyrica [Pregabalin] Hives    Movantik [Naloxegol] Rash    Seroquel [Quetiapine] Hives and Rash    Trulance [Plecanatide] Hives    Buspar [Buspirone] Rash    Clarithromycin Rash    Clindamycin/Lincomycin Rash    Codeine Rash    Contrast Dye (Echo Or Unknown Ct/Mr) Itching and Rash    Diltiazem Rash    Flomax [Tamsulosin] Hives    Gabapentin Rash    Iodinated Contrast Media Itching and Rash    Keflex [Cephalexin] Rash    Levocetirizine Rash    Linzess [Linaclotide] Rash    Metoprolol Rash    Prednisone Rash and Other (See Comments)     Face, feet, and legs go completely numb per patient    Robitussin Cough+ Chest Max St [Dextromethorphan-Guaifenesin] Itching    Shrimp (Diagnostic) Rash    Spironolactone Rash    Viibryd [Vilazodone Hcl] Itching and Rash    Zoloft [Sertraline Hcl] Hives and Itching     Allergies reviewed by Omaira Schwab [facility] on 1/23/2025 at  2:33 PM    Relevant Laboratory Values  Relevant laboratory values were discussed with the patient. The following specialty medication dose adjustment(s) are recommended: none    Lab Results   Component Value Date    GLUCOSE 128 (H) 10/17/2024    CALCIUM 8.9 10/17/2024     10/17/2024    K 3.9 10/17/2024    CO2 24.0 10/17/2024     10/17/2024    BUN 12 10/17/2024    CREATININE 1.12 10/17/2024    EGFRIFAFRI  08/26/2016      Comment:      <15 Indicative of kidney failure.    EGFRIFNONA 66 02/02/2022    BCR 10.7 10/17/2024    ANIONGAP 10.0 10/17/2024     Lab Results   Component Value Date    CHOL 215 (H) 10/17/2024    CHLPL 232 (H) 04/05/2016    TRIG 98 10/17/2024    HDL 66 (H) 10/17/2024     (H) 10/17/2024       Current Medication List  This medication list has been reviewed with the patient  and evaluated for any interactions or necessary modifications/recommendations, and updated to include all prescription medications, OTC medications, and supplements the patient is currently taking.  This list reflects what is contained in the patient's profile, which has also been marked as reviewed to communicate to other providers it is the most up to date version of the patient's current medication therapy.     Current Outpatient Medications:     albuterol sulfate  (90 Base) MCG/ACT inhaler, Inhale 2 puffs by mouth every 4 hours as needed., Disp: 18 g, Rfl: 3    Alcohol Swabs (Alcohol Prep) 70 % pads, Use 1 each 2 (Two) Times a Day., Disp: 60 each, Rfl: 5    aspirin (Aspirin EC Adult Low Dose) 81 MG EC tablet, Take 1 tablet by mouth Daily., Disp: 30 tablet, Rfl: 3    aspirin 325 MG tablet, Take 1 tablet by mouth Daily., Disp: 30 tablet, Rfl: 0    azelastine (ASTELIN) 0.1 % nasal spray, , Disp: , Rfl:     baclofen (LIORESAL) 20 MG tablet, Take 1 tablet by mouth 3 (Three) Times a Day., Disp: 15 tablet, Rfl: 0    buPROPion (WELLBUTRIN) 75 MG tablet, Take 1 tablet by mouth Daily., Disp: 30 tablet, Rfl: 5    cloNIDine (Catapres) 0.1 MG tablet, Take 1 tablet by mouth 2 (Two) Times a Day As Needed for High Blood Pressure (if bp more than 140/90)., Disp: 60 tablet, Rfl: 1    dexlansoprazole (Dexilant) 60 MG capsule, Take 1 capsule by mouth 2 (Two) Times a Day., Disp: 60 capsule, Rfl: 5    Dilantin 100 MG capsule, Take 2 capsules by mouth 2 (Two) Times a Day., Disp: 120 capsule, Rfl: 5    diphenhydrAMINE (Benadryl Allergy) 25 MG tablet, Take 1-2 tablets by mouth Every 8 (Eight) Hours As Needed for Itching (or rash)., Disp: 120 tablet, Rfl: 2    docusate calcium (SURFAK) 240 MG capsule, Take 1 capsule by mouth 2 (Two) Times a Day As Needed for Constipation., Disp: 60 capsule, Rfl: 2    Elastic Bandages & Supports (ACE Ankle Brace) misc, 1 each Daily., Disp: 1 each, Rfl: 0    EPINEPHrine (EPIPEN) 0.3 MG/0.3ML  solution auto-injector injection, Inject Intramuscularly into lateral thigh as needed for treatment of anaphylaxis, then go to the ER or call 911., Disp: 2 each, Rfl: 2    ergocalciferol (ERGOCALCIFEROL) 1.25 MG (26133 UT) capsule, Take 1 capsule by mouth 1 (One) Time Per Week., Disp: 4 capsule, Rfl: 3    Evolocumab (REPATHA) solution auto-injector SureClick injection, Inject 1 mL under the skin into the appropriate area as directed Every 14 (Fourteen) Days., Disp: 2 mL, Rfl: 5    fluticasone (FLONASE) 50 MCG/ACT nasal spray, Use 1 spray in each nostril Twice a day for 30 days, Disp: 16 g, Rfl: 11    fluticasone (Flovent HFA) 110 MCG/ACT inhaler, Inhale 1 puff by mouth 2 (Two) Times a Day., Disp: 12 g, Rfl: 5    fluticasone (FLOVENT HFA) 44 MCG/ACT inhaler, Inhale 1 puff by mouth twice a day., Disp: 10.6 g, Rfl: 5    glucose blood (OneTouch Ultra) test strip, Use as instructed 2 times daily and as needed, Disp: 100 each, Rfl: 3    HYDROcodone-acetaminophen (NORCO)  MG per tablet, Take 1 tablet by mouth every 6 (six) hours as needed for pain for up to 10 days. Max Daily Amount: 4 tablets, Disp: 35 tablet, Rfl: 0    HYDROcodone-acetaminophen (Norco) 5-325 MG per tablet, Take 1 tablet by mouth Every 6 (Six) Hours As Needed for Moderate Pain., Disp: 12 tablet, Rfl: 0    HYDROcodone-acetaminophen (Norco) 7.5-325 MG per tablet, Take 1 tablet by mouth every 6 (six) hours as needed for pain for up to 10 days. Max Daily Amount: 4 tablets, Disp: 25 tablet, Rfl: 0    ibuprofen (ADVIL,MOTRIN) 800 MG tablet, Take 1 tablet by mouth 2 (Two) Times a Day As Needed., Disp: 40 tablet, Rfl: 0    ibuprofen (ADVIL,MOTRIN) 800 MG tablet, Take 1 tablet by mouth Every 6 (Six) Hours As Needed., Disp: 40 tablet, Rfl: 1    ipratropium-albuterol (COMBIVENT RESPIMAT)  MCG/ACT inhaler, Inhale 1 puff by mouth 4 (Four) Times a Day As Needed for Wheezing., Disp: 4 g, Rfl: 0    ipratropium-albuterol (DUO-NEB) 0.5-2.5 mg/3 ml nebulizer,  Inhale the contents of 3 mL vial nebulizer every 6 hours as needed., Disp: 360 mL, Rfl: 3    Lancets (OneTouch Delica Plus [id number]) misc, Use as instructed 2 times daily and as needed, Disp: 100 each, Rfl: 3    levocetirizine (XYZAL) 5 MG tablet, Take 1 tablet by mouth in the evening Once a day 30 days, Disp: 30 tablet, Rfl: 11    lisinopril (PRINIVIL,ZESTRIL) 30 MG tablet, Take 1 tablet by mouth Daily for blood pressure., Disp: 90 tablet, Rfl: 1    mirtazapine (REMERON) 30 MG tablet, Take 1 & 1/2  tablets by mouth Every Night., Disp: 45 tablet, Rfl: 5    montelukast (SINGULAIR) 10 MG tablet, Take 1 tablet by mouth., Disp: , Rfl:     multivitamin with minerals tablet tablet, Take 1 tablet by mouth Daily., Disp: 30 tablet, Rfl: 12    ondansetron (Zofran) 4 MG tablet, Take 1 tablet by mouth Every 8 (Eight) Hours As Needed for Nausea., Disp: 20 tablet, Rfl: 0    oxyCODONE-acetaminophen (PERCOCET) 5-325 MG per tablet, Take 1 tablet by mouth Every 6 (Six) Hours As Needed. Max daily amount: 4 tablets, Disp: 30 tablet, Rfl: 0    rosuvastatin (Crestor) 40 MG tablet, Take 1 tablet by mouth Daily., Disp: 30 tablet, Rfl: 3    sodium chloride 0.65 % nasal spray, Use 1-3 sprays in each nostril Three times a day as needed 30 days, Disp: 44 mL, Rfl: 11    tamsulosin (FLOMAX) 0.4 MG capsule 24 hr capsule, Take 1 capsule by mouth Daily., Disp: 90 capsule, Rfl: 3    temazepam (Restoril) 15 MG capsule, Take 1 capsule by mouth At Night As Needed for Sleep., Disp: 30 capsule, Rfl: 1    tiZANidine (Zanaflex) 4 MG tablet, Take 1 tablet by mouth 3 (Three) Times a Day., Disp: 30 tablet, Rfl: 5    vitamin C (ASCORBIC ACID) 500 MG tablet, Take 1 tablet by mouth 2 (Two) Times a Day., Disp: 60 tablet, Rfl: 1    Medicines reviewed by Omaira Schwab, [facility] on 1/23/2025 at  2:33 PM    Drug Interactions  No significant drug-drug interactions with Repatha according to literature.     Adverse Drug Reactions  Medication tolerability: Tolerating with  no to minimal ADRs  Medication plan: Continue therapy with normal follow-up  Plan for ADR Management: n/a    Adherence, Self-Administration, and Current Therapy Problems  Adherence related to the patient's specialty therapy was discussed with the patient. The Adherence segment of this outreach has been reviewed and updated.     Adherence Questions  Linked Medication(s) Assessed: Evolocumab (REPATHA)  On average, how many doses/injections does the patient miss per month?: 0  What are the identified reasons for non-adherence or missed doses? : no problems identified  What is the estimated medication adherence level?: %  Based on the patient/caregiver response and refill history, does this patient require an MTP to track adherence improvements?: no    Additional Barriers to Patient Self-Administration: patient refuses to administer himself  Methods for Supporting Patient Self-Administration: patient comes to [facility] every 2 weeks for injections    Open Medication Therapy Problems  No medication therapy recommendations to display    Goals of Therapy  Goals related to the patient's specialty therapy were discussed with the patient. The Patient Goals segment of this outreach has been reviewed and updated.   Goals Addressed Today        Specialty Pharmacy General Goal      LDL reduction less than 70          LDL was 136 mg/dl on 3/20/24 and has improved to 132 mg/ml on 10/17/24. This lab work was completed prior to starting repatha therapy.    Quality of Life Assessment   Quality of Life related to the patient's enrollment in the patient management program and services provided was discussed with the patient. The QOL segment of this outreach has been reviewed and updated.  Quality of Life Improvement Scale: 8-Moderately better    Reassessment Plan & Follow-Up  1.Medication Therapy Changes: Patient will continue Repatha 140mg every 2 weeks    JUAN. Omaira Schwab,LisaD administered injection in to the back of his  Right arm   2. Related Plans, Therapy Recommendations, or Issues to Be Addressed: none  3. Pharmacist to perform regular assessments no more than (6) months from the previous assessment.  Patient will continue regular follow-up with referring provider.   4. Care Coordinator to set up future refill outreaches, coordinate prescription delivery, and escalate clinical questions to pharmacist.      Attestation  Therapeutic appropriateness: Appropriate   I attest the patient was actively involved in and has agreed to the above plan of care.  If the prescribed therapy is at any point deemed not appropriate based on the current or future assessments, a consultation will be initiated with the patient's specialty care provider to determine the best course of action. The revised plan of therapy will be documented along with any required assessments and/or additional patient education provided.     Patient to follow up for injections in Clinic every 2 weeks    Omaira Schwab RPH  1/23/2025  14:34 EST

## 2025-01-23 NOTE — PROGRESS NOTES
Medication Management Clinic/ Specialty Pharmacy Patient Management Program  Lipid Management Program - PCSK9i follow up Assessment     Jaquan Mckay is a 54 y.o. male referred by their provider, Tiera Valenzuela, to the Hyperlipidemia Patient Management program offered by [facility] Specialty Pharmacy for Lipid Management.  Jaquan Mckay is  treated for ASCVD and hyperlipidemia, and currently takes crestor 40 mg.  In the past, Pt has tried lipitor 40 mg, zocor 10 mg, pravastatin 80 mg and is not statin intolerant. The patient denies any allergies to latex.       A follow-up outreach was conducted, including assessment of continued therapy appropriateness, medication adherence, and side effect incidence and management for Repatha. Patient comes to clinic for injections and has not missed any doses.  Repatha was started on 10/29/24, and patient reports tolerating the medication well with no side effects.    Patient did report he received his allergy shots in both arms around 2 hours ago today 1/23/25. He reports his next one is in three weeks. He reports tolerating allergy shot fine. He reports he tolerated last Repatha injection without any issues. He denies any side effects.     Changes to Insurance Coverage or Financial Support  [employer] [place] (no change)    Relevant Past Medical History and Comorbidities  Relevant medical history and concomitant health conditions were discussed with the patient. The patient's chart has been reviewed for relevant past medical history and comorbid health conditions and updated as necessary.   Past Medical History:   Diagnosis Date    Allergic     Anxiety     Arthritis     Asthma     Body piercing     REPORTS CYLICONE IN EARS    Clotting disorder 2004    had a knee surgery    Coronary artery disease     Depression     DVT (deep venous thrombosis)     RIGHT RIGHT KNEE AFTER SURGERY YEARS AGO IN 2001 OR 2004    Elevated cholesterol      "Gastric ulcer     GERD (gastroesophageal reflux disease)     H/O migraine     Headache     Heart attack     REPORTS \"LIGHT HEART ATTACK A LONG TIME AGO\"  \"EARLY 90'S\"    History of seizures     REPORTS LAST EPISODE WAS AROUND 1995.    Hostility     Hyperlipidemia     Hypertension     Knee pain, acute     Left    Low back pain     Lyme disease     Migraine     MRSA (methicillin resistant Staphylococcus aureus)     REPORTS LAST TESTED + 2004. WAS TREATED HE REPORTS.  RIGHT ARM, RIGHT KNEE.    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     Obesity     Poor historian     [place] spotted fever     Seizures     Sleep apnea     Tattoo     Wears glasses      Social History     Socioeconomic History    Marital status:      Spouse name: Becca    Number of children: 2    Years of education: 12   Tobacco Use    Smoking status: Every Day     Current packs/day: 1.00     Average packs/day: 1 pack/day for 17.9 years (17.9 ttl pk-yrs)     Types: Cigars, Cigarettes     Start date: 4/11/2022     Passive exposure: Current    Smokeless tobacco: Never   Vaping Use    Vaping status: Never Used   Substance and Sexual Activity    Alcohol use: No    Drug use: No    Sexual activity: Defer     Partners: Female     Birth control/protection: None     Problem list reviewed by Omaira Schwab [facility] on 1/23/2025 at  1:46 PM    Hospitalizations and Urgent Care Since Last Assessment  ED Visits, Admissions, or Hospitalizations: none  Urgent Office Visits: none    Allergies  Known allergies and reactions were discussed with the patient. The patient's chart has been reviewed for allergy information and updated as necessary.   Allergies   Allergen Reactions    Ciprofloxacin Anaphylaxis and Hives    Miralax [Polyethylene Glycol] Itching and Rash    Mobic [Meloxicam] Other (See Comments)     Pt states, \"It make my feet and hands go numb and I can't hardly walk.\"     Paxil [Paroxetine Hcl] Shortness Of Breath     Chest pain     Peanut-Containing Drug Products " Anaphylaxis    Penicillins Anaphylaxis    Pristiq [Desvenlafaxine Succinate Er] Dizziness    Sulfa Antibiotics Anaphylaxis, Itching and Rash    Doxycycline Hives    Fish-Derived Products Hives    Isosorbide Nitrate Rash     Rash, hives, had to use inhaler.     Lyrica [Pregabalin] Hives    Movantik [Naloxegol] Rash    Seroquel [Quetiapine] Hives and Rash    Trulance [Plecanatide] Hives    Buspar [Buspirone] Rash    Clarithromycin Rash    Clindamycin/Lincomycin Rash    Codeine Rash    Contrast Dye (Echo Or Unknown Ct/Mr) Itching and Rash    Diltiazem Rash    Flomax [Tamsulosin] Hives    Gabapentin Rash    Iodinated Contrast Media Itching and Rash    Keflex [Cephalexin] Rash    Levocetirizine Rash    Linzess [Linaclotide] Rash    Metoprolol Rash    Prednisone Rash and Other (See Comments)     Face, feet, and legs go completely numb per patient    Robitussin Cough+ Chest Max St [Dextromethorphan-Guaifenesin] Itching    Shrimp (Diagnostic) Rash    Spironolactone Rash    Viibryd [Vilazodone Hcl] Itching and Rash    Zoloft [Sertraline Hcl] Hives and Itching     Allergies reviewed by Omaira Schwab [facility] on 1/23/2025 at  1:46 PM    Relevant Laboratory Values  Relevant laboratory values were discussed with the patient. The following specialty medication dose adjustment(s) are recommended: none    Lab Results   Component Value Date    GLUCOSE 128 (H) 10/17/2024    CALCIUM 8.9 10/17/2024     10/17/2024    K 3.9 10/17/2024    CO2 24.0 10/17/2024     10/17/2024    BUN 12 10/17/2024    CREATININE 1.12 10/17/2024    EGFRIFAFRI  08/26/2016      Comment:      <15 Indicative of kidney failure.    EGFRIFNONA 66 02/02/2022    BCR 10.7 10/17/2024    ANIONGAP 10.0 10/17/2024     Lab Results   Component Value Date    CHOL 215 (H) 10/17/2024    CHLPL 232 (H) 04/05/2016    TRIG 98 10/17/2024    HDL 66 (H) 10/17/2024     (H) 10/17/2024       Current Medication List  This medication list has been reviewed with the patient  and evaluated for any interactions or necessary modifications/recommendations, and updated to include all prescription medications, OTC medications, and supplements the patient is currently taking.  This list reflects what is contained in the patient's profile, which has also been marked as reviewed to communicate to other providers it is the most up to date version of the patient's current medication therapy.     Current Outpatient Medications:     albuterol sulfate  (90 Base) MCG/ACT inhaler, Inhale 2 puffs by mouth every 4 hours as needed., Disp: 18 g, Rfl: 3    Alcohol Swabs (Alcohol Prep) 70 % pads, Use 1 each 2 (Two) Times a Day., Disp: 60 each, Rfl: 5    aspirin (Aspirin EC Adult Low Dose) 81 MG EC tablet, Take 1 tablet by mouth Daily., Disp: 30 tablet, Rfl: 3    aspirin 325 MG tablet, Take 1 tablet by mouth Daily., Disp: 30 tablet, Rfl: 0    azelastine (ASTELIN) 0.1 % nasal spray, , Disp: , Rfl:     baclofen (LIORESAL) 20 MG tablet, Take 1 tablet by mouth 3 (Three) Times a Day., Disp: 15 tablet, Rfl: 0    buPROPion (WELLBUTRIN) 75 MG tablet, Take 1 tablet by mouth Daily., Disp: 30 tablet, Rfl: 5    cloNIDine (Catapres) 0.1 MG tablet, Take 1 tablet by mouth 2 (Two) Times a Day As Needed for High Blood Pressure (if bp more than 140/90)., Disp: 60 tablet, Rfl: 1    dexlansoprazole (Dexilant) 60 MG capsule, Take 1 capsule by mouth 2 (Two) Times a Day., Disp: 60 capsule, Rfl: 5    Dilantin 100 MG capsule, Take 2 capsules by mouth 2 (Two) Times a Day., Disp: 120 capsule, Rfl: 5    diphenhydrAMINE (Benadryl Allergy) 25 MG tablet, Take 1-2 tablets by mouth Every 8 (Eight) Hours As Needed for Itching (or rash)., Disp: 120 tablet, Rfl: 2    docusate calcium (SURFAK) 240 MG capsule, Take 1 capsule by mouth 2 (Two) Times a Day As Needed for Constipation., Disp: 60 capsule, Rfl: 2    Elastic Bandages & Supports (ACE Ankle Brace) misc, 1 each Daily., Disp: 1 each, Rfl: 0    EPINEPHrine (EPIPEN) 0.3 MG/0.3ML  solution auto-injector injection, Inject Intramuscularly into lateral thigh as needed for treatment of anaphylaxis, then go to the ER or call 911., Disp: 2 each, Rfl: 2    ergocalciferol (ERGOCALCIFEROL) 1.25 MG (55755 UT) capsule, Take 1 capsule by mouth 1 (One) Time Per Week., Disp: 4 capsule, Rfl: 3    Evolocumab (REPATHA) solution auto-injector SureClick injection, Inject 1 mL under the skin into the appropriate area as directed Every 14 (Fourteen) Days., Disp: 2 mL, Rfl: 5    fluticasone (FLONASE) 50 MCG/ACT nasal spray, Use 1 spray in each nostril Twice a day for 30 days, Disp: 16 g, Rfl: 11    fluticasone (Flovent HFA) 110 MCG/ACT inhaler, Inhale 1 puff by mouth 2 (Two) Times a Day., Disp: 12 g, Rfl: 5    fluticasone (FLOVENT HFA) 44 MCG/ACT inhaler, Inhale 1 puff by mouth twice a day., Disp: 10.6 g, Rfl: 5    glucose blood (OneTouch Ultra) test strip, Use as instructed 2 times daily and as needed, Disp: 100 each, Rfl: 3    HYDROcodone-acetaminophen (NORCO)  MG per tablet, Take 1 tablet by mouth every 6 (six) hours as needed for pain for up to 10 days. Max Daily Amount: 4 tablets, Disp: 35 tablet, Rfl: 0    HYDROcodone-acetaminophen (Norco) 5-325 MG per tablet, Take 1 tablet by mouth Every 6 (Six) Hours As Needed for Moderate Pain., Disp: 12 tablet, Rfl: 0    HYDROcodone-acetaminophen (Norco) 7.5-325 MG per tablet, Take 1 tablet by mouth every 6 (six) hours as needed for pain for up to 10 days. Max Daily Amount: 4 tablets, Disp: 25 tablet, Rfl: 0    ibuprofen (ADVIL,MOTRIN) 800 MG tablet, Take 1 tablet by mouth 2 (Two) Times a Day As Needed., Disp: 40 tablet, Rfl: 0    ibuprofen (ADVIL,MOTRIN) 800 MG tablet, Take 1 tablet by mouth Every 6 (Six) Hours As Needed., Disp: 40 tablet, Rfl: 1    ipratropium-albuterol (COMBIVENT RESPIMAT)  MCG/ACT inhaler, Inhale 1 puff by mouth 4 (Four) Times a Day As Needed for Wheezing., Disp: 4 g, Rfl: 0    ipratropium-albuterol (DUO-NEB) 0.5-2.5 mg/3 ml nebulizer,  Inhale the contents of 3 mL vial nebulizer every 6 hours as needed., Disp: 360 mL, Rfl: 3    Lancets (OneTouch Delica Plus [id number]) misc, Use as instructed 2 times daily and as needed, Disp: 100 each, Rfl: 3    levocetirizine (XYZAL) 5 MG tablet, Take 1 tablet by mouth in the evening Once a day 30 days, Disp: 30 tablet, Rfl: 11    lisinopril (PRINIVIL,ZESTRIL) 30 MG tablet, Take 1 tablet by mouth Daily for blood pressure., Disp: 90 tablet, Rfl: 1    mirtazapine (REMERON) 30 MG tablet, Take 1 & 1/2  tablets by mouth Every Night., Disp: 45 tablet, Rfl: 5    montelukast (SINGULAIR) 10 MG tablet, Take 1 tablet by mouth., Disp: , Rfl:     multivitamin with minerals tablet tablet, Take 1 tablet by mouth Daily., Disp: 30 tablet, Rfl: 12    ondansetron (Zofran) 4 MG tablet, Take 1 tablet by mouth Every 8 (Eight) Hours As Needed for Nausea., Disp: 20 tablet, Rfl: 0    oxyCODONE-acetaminophen (PERCOCET) 5-325 MG per tablet, Take 1 tablet by mouth Every 6 (Six) Hours As Needed. Max daily amount: 4 tablets, Disp: 30 tablet, Rfl: 0    rosuvastatin (Crestor) 40 MG tablet, Take 1 tablet by mouth Daily., Disp: 30 tablet, Rfl: 3    sodium chloride 0.65 % nasal spray, Use 1-3 sprays in each nostril Three times a day as needed 30 days, Disp: 44 mL, Rfl: 11    tamsulosin (FLOMAX) 0.4 MG capsule 24 hr capsule, Take 1 capsule by mouth Daily., Disp: 90 capsule, Rfl: 3    temazepam (Restoril) 15 MG capsule, Take 1 capsule by mouth At Night As Needed for Sleep., Disp: 30 capsule, Rfl: 1    tiZANidine (Zanaflex) 4 MG tablet, Take 1 tablet by mouth 3 (Three) Times a Day., Disp: 30 tablet, Rfl: 5    vitamin C (ASCORBIC ACID) 500 MG tablet, Take 1 tablet by mouth 2 (Two) Times a Day., Disp: 60 tablet, Rfl: 1    Medicines reviewed by Omaira Schwab, [facility] on 1/23/2025 at  1:46 PM    Drug Interactions  No significant drug-drug interactions with Repatha according to literature.     Adverse Drug Reactions        Plan for ADR Management:  n/a    Adherence, Self-Administration, and Current Therapy Problems  Adherence related to the patient's specialty therapy was discussed with the patient. The Adherence segment of this outreach has been reviewed and updated.          Additional Barriers to Patient Self-Administration: patient refuses to administer himself  Methods for Supporting Patient Self-Administration: patient comes to [facility] clinic every 2 weeks for injections    Open Medication Therapy Problems  No medication therapy recommendations to display    Goals of Therapy  Goals related to the patient's specialty therapy were discussed with the patient. The Patient Goals segment of this outreach has been reviewed and updated.   Goals Addressed Today        Specialty Pharmacy General Goal      LDL reduction less than 70          LDL was 136 mg/dl on 3/20/24 and has improved to 132 mg/ml on 10/17/24. This lab work was completed prior to starting repatha therapy.    Quality of Life Assessment   Quality of Life related to the patient's enrollment in the patient management program and services provided was discussed with the patient. The QOL segment of this outreach has been reviewed and updated.       Reassessment Plan & Follow-Up  1.Medication Therapy Changes: Patient will continue Repatha 140mg every 2 weeks    A. Omaira Schwab PharmD administered injection in to the back of his Right arm   2. Related Plans, Therapy Recommendations, or Issues to Be Addressed: none  3. Pharmacist to perform regular assessments no more than (6) months from the previous assessment.  Patient will continue regular follow-up with referring provider.   4. Care Coordinator to set up future refill outreaches, coordinate prescription delivery, and escalate clinical questions to pharmacist.      Attestation      I attest the patient was actively involved in and has agreed to the above plan of care.  If the prescribed therapy is at any point deemed not appropriate based on the current  or future assessments, a consultation will be initiated with the patient's specialty care provider to determine the best course of action. The revised plan of therapy will be documented along with any required assessments and/or additional patient education provided.     Patient to follow up for injections in Clinic every 2 weeks    Omaira Schwab RPH  1/23/2025  14:25 EST

## 2025-01-30 ENCOUNTER — TELEPHONE (OUTPATIENT)
Dept: FAMILY MEDICINE CLINIC | Facility: CLINIC | Age: 55
End: 2025-01-30

## 2025-01-30 ENCOUNTER — OFFICE VISIT (OUTPATIENT)
Dept: FAMILY MEDICINE CLINIC | Facility: CLINIC | Age: 55
End: 2025-01-30
Payer: COMMERCIAL

## 2025-01-30 VITALS
WEIGHT: 238 LBS | SYSTOLIC BLOOD PRESSURE: 134 MMHG | RESPIRATION RATE: 18 BRPM | OXYGEN SATURATION: 99 % | BODY MASS INDEX: 37.35 KG/M2 | HEART RATE: 91 BPM | DIASTOLIC BLOOD PRESSURE: 80 MMHG | HEIGHT: 67 IN

## 2025-01-30 DIAGNOSIS — Z51.89 VISIT FOR WOUND CHECK: ICD-10-CM

## 2025-01-30 DIAGNOSIS — F51.04 PSYCHOPHYSIOLOGICAL INSOMNIA: Primary | ICD-10-CM

## 2025-01-30 PROCEDURE — 1159F MED LIST DOCD IN RCRD: CPT | Performed by: NURSE PRACTITIONER

## 2025-01-30 PROCEDURE — 1160F RVW MEDS BY RX/DR IN RCRD: CPT | Performed by: NURSE PRACTITIONER

## 2025-01-30 PROCEDURE — 3079F DIAST BP 80-89 MM HG: CPT | Performed by: NURSE PRACTITIONER

## 2025-01-30 PROCEDURE — 3075F SYST BP GE 130 - 139MM HG: CPT | Performed by: NURSE PRACTITIONER

## 2025-01-30 PROCEDURE — 99213 OFFICE O/P EST LOW 20 MIN: CPT | Performed by: NURSE PRACTITIONER

## 2025-01-30 PROCEDURE — 1125F AMNT PAIN NOTED PAIN PRSNT: CPT | Performed by: NURSE PRACTITIONER

## 2025-01-30 RX ORDER — TEMAZEPAM 30 MG/1
30 CAPSULE ORAL NIGHTLY PRN
Qty: 30 CAPSULE | Refills: 0 | Status: SHIPPED | OUTPATIENT
Start: 2025-01-30 | End: 2025-01-30 | Stop reason: SDUPTHER

## 2025-01-30 RX ORDER — TEMAZEPAM 30 MG/1
30 CAPSULE ORAL NIGHTLY PRN
Qty: 30 CAPSULE | Refills: 0 | Status: SHIPPED | OUTPATIENT
Start: 2025-01-30

## 2025-01-30 NOTE — TELEPHONE ENCOUNTER
Patient called wanting to know if Sia was going to up his anxiety medication. He said at his appointment today they had discussed it. I spoke with Sia and she said that she wanted to see how he does with the increase in his temazepam before she she increases his anxiety medicines. Patient is in understanding.

## 2025-01-30 NOTE — PROGRESS NOTES
Subjective   Jaquan Mckay is a 54 y.o. male.     Chief Complaint   Patient presents with    Foot Problem       History of Present Illness     Foot problem-had his cast off Friday.  He is in a walking boot.  He reports later he noted some yellow to orange discoloration of his foot.  He has not noted any sensation changes.  He has 3 stitches remaining.  He reports that he needs some gloves to help with his wound care at home.   He is doing his care at home.  He reports that he has not noted any increase in swelling.  No increased pain.  He is concerned he has a surgical complication.  He has a follow up in 3 weeks for the final stitch removal.  He will start PT after.    Insomnia-reports that despite his remeron 45 mg and restoril 15 mg.   He is on Wellbutrin 75 mg. He feels he is more anxious and awakening frequently.  He has several stressors.  He is waking often but is not going to sleep.  His wife's death anniversary is necessary.      The following portions of the patient's history were reviewed and updated as appropriate: CC, ROS, allergies, current medications, past family history, past medical history, past social history, past surgical history and problem list.    Review of Systems   Constitutional:  Positive for fatigue. Negative for appetite change, unexpected weight gain and unexpected weight loss.   HENT:  Negative for congestion, ear pain, postnasal drip, rhinorrhea, sore throat, swollen glands, trouble swallowing and voice change.    Eyes:  Negative for pain and visual disturbance.   Respiratory:  Negative for cough, chest tightness, shortness of breath and wheezing.    Cardiovascular:  Negative for chest pain and palpitations.   Gastrointestinal:  Negative for abdominal pain, blood in stool, constipation, diarrhea, nausea and indigestion.   Genitourinary:  Negative for dysuria, hematuria and urgency.   Musculoskeletal:  Positive for arthralgias, back pain and gait problem.   Skin:  Negative for color  "change and skin lesions.   Allergic/Immunologic: Negative.    Neurological:  Negative for dizziness, numbness, headache and confusion.   Hematological: Negative.    Psychiatric/Behavioral:  Positive for decreased concentration, dysphoric mood, sleep disturbance and stress. Negative for suicidal ideas. The patient is nervous/anxious.    All other systems reviewed and are negative.      Objective     /80   Pulse 91   Resp 18   Ht 170.2 cm (67\")   Wt 108 kg (238 lb)   SpO2 99%   BMI 37.28 kg/m²     Physical Exam  Vitals reviewed.   Constitutional:       General: He is not in acute distress.     Appearance: He is well-developed. He is obese. He is not diaphoretic.   HENT:      Head: Normocephalic and atraumatic.      Jaw: No tenderness.      Right Ear: Hearing, tympanic membrane, ear canal and external ear normal.      Left Ear: Hearing, tympanic membrane, ear canal and external ear normal.      Nose: Nose normal. No nasal tenderness or congestion.      Right Sinus: No maxillary sinus tenderness or frontal sinus tenderness.      Left Sinus: No maxillary sinus tenderness or frontal sinus tenderness.      Mouth/Throat:      Lips: Pink.      Mouth: Mucous membranes are moist.      Pharynx: Oropharynx is clear. Uvula midline.   Eyes:      General: Lids are normal. No scleral icterus.     Extraocular Movements:      Right eye: Normal extraocular motion and no nystagmus.      Left eye: Normal extraocular motion and no nystagmus.      Conjunctiva/sclera: Conjunctivae normal.      Pupils: Pupils are equal, round, and reactive to light.   Neck:      Thyroid: No thyromegaly or thyroid tenderness.      Vascular: No carotid bruit or JVD.      Trachea: No tracheal tenderness.   Cardiovascular:      Rate and Rhythm: Normal rate and regular rhythm.      Pulses:           Dorsalis pedis pulses are 2+ on the right side and 2+ on the left side.        Posterior tibial pulses are 2+ on the right side and 2+ on the left side. "      Heart sounds: Normal heart sounds, S1 normal and S2 normal. No murmur heard.  Pulmonary:      Effort: Pulmonary effort is normal. No accessory muscle usage, prolonged expiration or respiratory distress.      Breath sounds: Normal breath sounds.   Chest:      Chest wall: No tenderness.   Abdominal:      General: Bowel sounds are normal. There is no distension.      Palpations: Abdomen is soft. There is no hepatomegaly, splenomegaly or mass.      Tenderness: There is no abdominal tenderness.   Musculoskeletal:         General: Tenderness present.      Cervical back: Normal range of motion and neck supple.      Lumbar back: Tenderness present.      Right lower leg: No edema.      Left lower leg: No edema.      Right ankle: Tenderness present. Decreased range of motion.      Left ankle: Tenderness present. Decreased range of motion.      Right foot: Decreased range of motion. Normal capillary refill. Swelling and tenderness present.      Left foot: Decreased range of motion. Normal capillary refill. Tenderness present.      Comments: No muscular atrophy or flaccidity.  Left foot with brace for support  Right foot in walking boot.  Toes warm.  ROM in toes WNL.  Mild edema  Large amount of dry scaling skin to lower portion of leg and bottom of foot.  Cleaned with NS and gauze.  Moderate amount of skin removed from cleaning. Skin on heel intact.  Appears stained with Betadine.  Scant amount of discoloration washes off.    Lymphadenopathy:      Head:      Right side of head: No submental or submandibular adenopathy.      Left side of head: No submental or submandibular adenopathy.      Cervical: No cervical adenopathy.      Right cervical: No superficial cervical adenopathy.     Left cervical: No superficial cervical adenopathy.   Skin:     General: Skin is warm and dry.      Capillary Refill: Capillary refill takes less than 2 seconds.      Coloration: Skin is not jaundiced or pale.      Findings: No erythema.       Nails: There is no clubbing.   Neurological:      Mental Status: He is alert and oriented to person, place, and time.      Cranial Nerves: No cranial nerve deficit or facial asymmetry.      Sensory: No sensory deficit.      Motor: No weakness, tremor, atrophy or abnormal muscle tone.      Coordination: Coordination normal.      Gait: Gait abnormal (moderate antalgia).      Deep Tendon Reflexes: Reflexes are normal and symmetric.   Psychiatric:         Attention and Perception: He is attentive.         Mood and Affect: Mood normal. Mood is not anxious or depressed.         Speech: Speech normal.         Behavior: Behavior normal. Behavior is cooperative.         Thought Content: Thought content normal.         Cognition and Memory: Cognition normal.         Judgment: Judgment normal.         Diagnoses and all orders for this visit:    1. Psychophysiological insomnia (Primary)  Overview:  With depression and anxiety      Orders:  -     Discontinue: temazepam (RESTORIL) 30 MG capsule; Take 1 capsule by mouth At Night As Needed for Sleep.  Dispense: 30 capsule; Refill: 0  -     temazepam (RESTORIL) 30 MG capsule; Take 1 capsule by mouth At Night As Needed for Sleep.  Dispense: 30 capsule; Refill: 0    2. Visit for wound check  Comments:  cleaned and new dressing applied.  continue to wear walking boot.  Continue under care of ortho      Understands disease processes and need for medications.  Understands reasons for urgent and emergent care.  Patient (& family) verbalized agreement for treatment plan.   Emotional support and active listening provided.  Patient provided time to verbalize feelings.  Increase restoril today.  Will increaese Wellbutrin at next visit.     RTC PRN 3-5 days for worsening or non resolving symptoms    Keep scheduled follow up.             This document has been electronically signed by:  BALDOMERO Thao FNP-C Dragon disclaimer:  Part of this note may be an electronic  transcription/translation of spoken language to printed text using the Dragon Dictation System.

## 2025-01-31 ENCOUNTER — HOSPITAL ENCOUNTER (OUTPATIENT)
Facility: HOSPITAL | Age: 55
Discharge: HOME | End: 2025-01-31
Payer: COMMERCIAL

## 2025-01-31 DIAGNOSIS — E78.2 MIXED HYPERLIPIDEMIA: ICD-10-CM

## 2025-01-31 LAB
CHOLEST SERPL-MCNC: 144 MG/DL (ref 0–200)
HDLC SERPL-MCNC: 64 MG/DL (ref 40–60)
LDLC SERPL CALC-MCNC: 64 MG/DL (ref 0–100)
LDLC/HDLC SERPL: 0.98 {RATIO}
TRIGL SERPL-MCNC: 87 MG/DL (ref 0–150)
VLDLC SERPL-MCNC: 16 MG/DL (ref 5–40)

## 2025-01-31 PROCEDURE — 36415 COLL VENOUS BLD VENIPUNCTURE: CPT | Performed by: NURSE PRACTITIONER

## 2025-01-31 PROCEDURE — 80061 LIPID PANEL: CPT | Performed by: NURSE PRACTITIONER

## 2025-02-03 ENCOUNTER — TELEPHONE (OUTPATIENT)
Dept: FAMILY MEDICINE CLINIC | Facility: CLINIC | Age: 55
End: 2025-02-03

## 2025-02-03 NOTE — TELEPHONE ENCOUNTER
Pt stated that pharmacy did not get prescription. I called pharmacy and got this corrected. They are going to get it fixed and filled for him. Unable to reach the patient. Could not leave voicemail. Will try again later.     Hub to read/relay!

## 2025-02-03 NOTE — TELEPHONE ENCOUNTER
THE PATIENT CALLED AND STATED THAT HE WANTS A RETURN CALL FROM THE OFFICE TODAY ABOUT HIS MEDICATION.  THE PATIENT SAID IT IS HIS SLEEPING MEDICATION  CALL  PATRICIA SMITH

## 2025-02-06 ENCOUNTER — DISEASE STATE MANAGEMENT VISIT (OUTPATIENT)
Dept: PHARMACY | Facility: HOSPITAL | Age: 55
End: 2025-02-06
Payer: COMMERCIAL

## 2025-02-06 NOTE — PROGRESS NOTES
"   Medication Management [facility]ty Pharmacy Patient Management Program  Lipid Management Program - PCSK9i follow up Assessment     Jaquan Mckay is a 54 y.o. male referred by their provider, Tiera Valenzuela, to the Hyperlipidemia Patient Management program offered by [facility] for Lipid Management.  Jaquan Mckay is  treated for ASCVD and hyperlipidemia, and currently takes crestor 40 mg.  In the past, Pt has tried lipitor 40 mg, zocor 10 mg, pravastatin 80 mg and is not statin intolerant. The patient denies any allergies to latex.       A follow-up outreach was conducted, including assessment of continued therapy appropriateness, medication adherence, and side effect incidence and management for Repatha. Patient comes to clinic for injections and has not missed any doses.  Repatha was started on 10/29/24, and patient reports tolerating the medication well with no side effects.    He reports he tolerated last Repatha injection without any issues. He denies any side effects.     Changes to Insurance Coverage or Financial Support  [employer] [place] (no change)    Relevant Past Medical History and Comorbidities  Relevant medical history and concomitant health conditions were discussed with the patient. The patient's chart has been reviewed for relevant past medical history and comorbid health conditions and updated as necessary.   Past Medical History:   Diagnosis Date    Allergic     Anxiety     Arthritis     Asthma     Body piercing     REPORTS CYLICONE IN EARS    Clotting disorder 2004    had a knee surgery    Coronary artery disease     Depression     DVT (deep venous thrombosis)     RIGHT RIGHT KNEE AFTER SURGERY YEARS AGO IN 2001 OR 2004    Elevated cholesterol     Gastric ulcer     GERD (gastroesophageal reflux disease)     H/O migraine     Headache     Heart attack     REPORTS \"LIGHT HEART ATTACK A LONG TIME AGO\"  \"EARLY 90'S\"    History of " "seizures     REPORTS LAST EPISODE WAS AROUND 1995.    Hostility     Hyperlipidemia     Hypertension     Knee pain, acute     Left    Low back pain     Lyme disease     Migraine     MRSA (methicillin resistant Staphylococcus aureus)     REPORTS LAST TESTED + 2004. WAS TREATED HE REPORTS.  RIGHT ARM, RIGHT KNEE.    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     Obesity     Poor historian     [place] spotted fever     Seizures     Sleep apnea     Tattoo     Wears glasses      Social History     Socioeconomic History    Marital status:      Spouse name: Becca    Number of children: 2    Years of education: 12   Tobacco Use    Smoking status: Every Day     Current packs/day: 1.00     Average packs/day: 1 pack/day for 17.9 years (17.9 ttl pk-yrs)     Types: Cigars, Cigarettes     Start date: 4/11/2022     Passive exposure: Current    Smokeless tobacco: Never   Vaping Use    Vaping status: Never Used   Substance and Sexual Activity    Alcohol use: No    Drug use: No    Sexual activity: Defer     Partners: Female     Birth control/protection: None          Hospitalizations and Urgent Care Since Last Assessment  ED Visits, Admissions, or Hospitalizations: none  Urgent Office Visits: none    Allergies  Known allergies and reactions were discussed with the patient. The patient's chart has been reviewed for allergy information and updated as necessary.   Allergies   Allergen Reactions    Ciprofloxacin Anaphylaxis and Hives    Miralax [Polyethylene Glycol] Itching and Rash    Mobic [Meloxicam] Other (See Comments)     Pt states, \"It make my feet and hands go numb and I can't hardly walk.\"     Paxil [Paroxetine Hcl] Shortness Of Breath     Chest pain     Peanut-Containing Drug Products Anaphylaxis    Penicillins Anaphylaxis    Pristiq [Desvenlafaxine Succinate Er] Dizziness    Sulfa Antibiotics Anaphylaxis, Itching and Rash    Doxycycline Hives    Fish-Derived Products Hives    Isosorbide Nitrate Rash     Rash, hives, had to use " inhaler.     Lyrica [Pregabalin] Hives    Movantik [Naloxegol] Rash    Seroquel [Quetiapine] Hives and Rash    Trulance [Plecanatide] Hives    Buspar [Buspirone] Rash    Clarithromycin Rash    Clindamycin/Lincomycin Rash    Codeine Rash    Contrast Dye (Echo Or Unknown Ct/Mr) Itching and Rash    Diltiazem Rash    Flomax [Tamsulosin] Hives    Gabapentin Rash    Iodinated Contrast Media Itching and Rash    Keflex [Cephalexin] Rash    Levocetirizine Rash    Linzess [Linaclotide] Rash    Metoprolol Rash    Prednisone Rash and Other (See Comments)     Face, feet, and legs go completely numb per patient    Robitussin Cough+ Chest Max St [Dextromethorphan-Guaifenesin] Itching    Shrimp (Diagnostic) Rash    Spironolactone Rash    Viibryd [Vilazodone Hcl] Itching and Rash    Zoloft [Sertraline Hcl] Hives and Itching          Relevant Laboratory Values  Relevant laboratory values were discussed with the patient. The following specialty medication dose adjustment(s) are recommended: none    Lab Results   Component Value Date    GLUCOSE 128 (H) 10/17/2024    CALCIUM 8.9 10/17/2024     10/17/2024    K 3.9 10/17/2024    CO2 24.0 10/17/2024     10/17/2024    BUN 12 10/17/2024    CREATININE 1.12 10/17/2024    EGFRIFAFRI  08/26/2016      Comment:      <15 Indicative of kidney failure.    EGFRIFNONA 66 02/02/2022    BCR 10.7 10/17/2024    ANIONGAP 10.0 10/17/2024     Lab Results   Component Value Date    CHOL 144 01/31/2025    CHLPL 232 (H) 04/05/2016    TRIG 87 01/31/2025    HDL 64 (H) 01/31/2025    LDL 64 01/31/2025       Current Medication List  This medication list has been reviewed with the patient and evaluated for any interactions or necessary modifications/recommendations, and updated to include all prescription medications, OTC medications, and supplements the patient is currently taking.  This list reflects what is contained in the patient's profile, which has also been marked as reviewed to communicate to other  providers it is the most up to date version of the patient's current medication therapy.     Current Outpatient Medications:     albuterol sulfate  (90 Base) MCG/ACT inhaler, Inhale 2 puffs by mouth every 4 hours as needed., Disp: 18 g, Rfl: 3    Alcohol Swabs (Alcohol Prep) 70 % pads, Use 1 each 2 (Two) Times a Day., Disp: 60 each, Rfl: 5    aspirin (Aspirin EC Adult Low Dose) 81 MG EC tablet, Take 1 tablet by mouth Daily., Disp: 30 tablet, Rfl: 3    aspirin 325 MG tablet, Take 1 tablet by mouth Daily., Disp: 30 tablet, Rfl: 0    azelastine (ASTELIN) 0.1 % nasal spray, , Disp: , Rfl:     baclofen (LIORESAL) 20 MG tablet, Take 1 tablet by mouth 3 (Three) Times a Day., Disp: 15 tablet, Rfl: 0    buPROPion (WELLBUTRIN) 75 MG tablet, Take 1 tablet by mouth Daily., Disp: 30 tablet, Rfl: 5    cloNIDine (Catapres) 0.1 MG tablet, Take 1 tablet by mouth 2 (Two) Times a Day As Needed for High Blood Pressure (if bp more than 140/90)., Disp: 60 tablet, Rfl: 1    dexlansoprazole (Dexilant) 60 MG capsule, Take 1 capsule by mouth 2 (Two) Times a Day., Disp: 60 capsule, Rfl: 5    Dilantin 100 MG capsule, Take 2 capsules by mouth 2 (Two) Times a Day., Disp: 120 capsule, Rfl: 5    diphenhydrAMINE (Benadryl Allergy) 25 MG tablet, Take 1-2 tablets by mouth Every 8 (Eight) Hours As Needed for Itching (or rash)., Disp: 120 tablet, Rfl: 2    docusate calcium (SURFAK) 240 MG capsule, Take 1 capsule by mouth 2 (Two) Times a Day As Needed for Constipation., Disp: 60 capsule, Rfl: 2    Elastic Bandages & Supports (ACE Ankle Brace) misc, 1 each Daily., Disp: 1 each, Rfl: 0    EPINEPHrine (EPIPEN) 0.3 MG/0.3ML solution auto-injector injection, Inject Intramuscularly into lateral thigh as needed for treatment of anaphylaxis, then go to the ER or call 911., Disp: 2 each, Rfl: 2    ergocalciferol (ERGOCALCIFEROL) 1.25 MG (06749 UT) capsule, Take 1 capsule by mouth 1 (One) Time Per Week., Disp: 4 capsule, Rfl: 3    Evolocumab (REPATHA)  solution auto-injector SureClick injection, Inject 1 mL under the skin into the appropriate area as directed Every 14 (Fourteen) Days., Disp: 2 mL, Rfl: 5    fluticasone (FLONASE) 50 MCG/ACT nasal spray, Use 1 spray in each nostril Twice a day for 30 days, Disp: 16 g, Rfl: 11    fluticasone (Flovent HFA) 110 MCG/ACT inhaler, Inhale 1 puff by mouth 2 (Two) Times a Day., Disp: 12 g, Rfl: 5    fluticasone (FLOVENT HFA) 44 MCG/ACT inhaler, Inhale 1 puff by mouth twice a day., Disp: 10.6 g, Rfl: 5    glucose blood (OneTouch Ultra) test strip, Use as instructed 2 times daily and as needed, Disp: 100 each, Rfl: 3    HYDROcodone-acetaminophen (NORCO)  MG per tablet, Take 1 tablet by mouth every 6 (six) hours as needed for pain for up to 10 days. Max Daily Amount: 4 tablets, Disp: 35 tablet, Rfl: 0    HYDROcodone-acetaminophen (Norco) 5-325 MG per tablet, Take 1 tablet by mouth Every 6 (Six) Hours As Needed for Moderate Pain., Disp: 12 tablet, Rfl: 0    HYDROcodone-acetaminophen (Norco) 7.5-325 MG per tablet, Take 1 tablet by mouth every 6 (six) hours as needed for pain for up to 10 days. Max Daily Amount: 4 tablets, Disp: 25 tablet, Rfl: 0    ibuprofen (ADVIL,MOTRIN) 800 MG tablet, Take 1 tablet by mouth 2 (Two) Times a Day As Needed., Disp: 40 tablet, Rfl: 0    ibuprofen (ADVIL,MOTRIN) 800 MG tablet, Take 1 tablet by mouth Every 6 (Six) Hours As Needed., Disp: 40 tablet, Rfl: 1    ipratropium-albuterol (COMBIVENT RESPIMAT)  MCG/ACT inhaler, Inhale 1 puff by mouth 4 (Four) Times a Day As Needed for Wheezing., Disp: 4 g, Rfl: 0    ipratropium-albuterol (DUO-NEB) 0.5-2.5 mg/3 ml nebulizer, Inhale the contents of 3 mL vial nebulizer every 6 hours as needed., Disp: 360 mL, Rfl: 3    Lancets (OneTouch Delica Plus [id number]) misc, Use as instructed 2 times daily and as needed, Disp: 100 each, Rfl: 3    levocetirizine (XYZAL) 5 MG tablet, Take 1 tablet by mouth in the evening Once a day 30 days, Disp: 30 tablet, Rfl:  11    lisinopril (PRINIVIL,ZESTRIL) 30 MG tablet, Take 1 tablet by mouth Daily for blood pressure., Disp: 90 tablet, Rfl: 1    mirtazapine (REMERON) 30 MG tablet, Take 1 & 1/2  tablets by mouth Every Night., Disp: 45 tablet, Rfl: 5    montelukast (SINGULAIR) 10 MG tablet, Take 1 tablet by mouth., Disp: , Rfl:     multivitamin with minerals tablet tablet, Take 1 tablet by mouth Daily., Disp: 30 tablet, Rfl: 12    ondansetron (Zofran) 4 MG tablet, Take 1 tablet by mouth Every 8 (Eight) Hours As Needed for Nausea., Disp: 20 tablet, Rfl: 0    oxyCODONE-acetaminophen (PERCOCET) 5-325 MG per tablet, Take 1 tablet by mouth Every 6 (Six) Hours As Needed. Max daily amount: 4 tablets, Disp: 30 tablet, Rfl: 0    rosuvastatin (Crestor) 40 MG tablet, Take 1 tablet by mouth Daily., Disp: 30 tablet, Rfl: 3    sodium chloride 0.65 % nasal spray, Use 1-3 sprays in each nostril Three times a day as needed 30 days, Disp: 44 mL, Rfl: 11    tamsulosin (FLOMAX) 0.4 MG capsule 24 hr capsule, Take 1 capsule by mouth Daily., Disp: 90 capsule, Rfl: 3    temazepam (RESTORIL) 30 MG capsule, Take 1 capsule by mouth At Night As Needed for Sleep., Disp: 30 capsule, Rfl: 0    tiZANidine (Zanaflex) 4 MG tablet, Take 1 tablet by mouth 3 (Three) Times a Day., Disp: 30 tablet, Rfl: 5    vitamin C (ASCORBIC ACID) 500 MG tablet, Take 1 tablet by mouth 2 (Two) Times a Day., Disp: 60 tablet, Rfl: 1         Drug Interactions  No significant drug-drug interactions with Repatha according to literature.     Adverse Drug Reactions        Plan for ADR Management: n/a    Adherence, Self-Administration, and Current Therapy Problems  Adherence related to the patient's specialty therapy was discussed with the patient. The Adherence segment of this outreach has been reviewed and updated.          Additional Barriers to Patient Self-Administration: patient refuses to administer himself  Methods for Supporting Patient Self-Administration: patient comes to [facility] clinic  every 2 weeks for injections    Open Medication Therapy Problems  No medication therapy recommendations to display    Goals of Therapy  Goals related to the patient's specialty therapy were discussed with the patient. The Patient Goals segment of this outreach has been reviewed and updated.   Goals Addressed Today    None     LDL was 136 mg/dl on 3/20/24 and has improved to 132 mg/ml on 10/17/24. This lab work was completed prior to starting repatha therapy.    Quality of Life Assessment   Quality of Life related to the patient's enrollment in the patient management program and services provided was discussed with the patient. The QOL segment of this outreach has been reviewed and updated.       Reassessment Plan & Follow-Up  1.Medication Therapy Changes: Patient will continue Repatha 140mg every 2 weeks    A. Omaira Schwab PharmD administered injection in to the back of his LEFT arm   2. Related Plans, Therapy Recommendations, or Issues to Be Addressed: none  3. Pharmacist to perform regular assessments no more than (6) months from the previous assessment.  Patient will continue regular follow-up with referring provider.   4. Care Coordinator to set up future refill outreaches, coordinate prescription delivery, and escalate clinical questions to pharmacist.      Attestation      I attest the patient was actively involved in and has agreed to the above plan of care.  If the prescribed therapy is at any point deemed not appropriate based on the current or future assessments, a consultation will be initiated with the patient's specialty care provider to determine the best course of action. The revised plan of therapy will be documented along with any required assessments and/or additional patient education provided.     Patient to follow up for injections in Clinic every 2 weeks    Omaira Schwab RPH  2/6/2025  14:56 EST

## 2025-02-12 ENCOUNTER — TRANSCRIBE ORDERS (OUTPATIENT)
Dept: PHYSICAL THERAPY | Facility: HOSPITAL | Age: 55
End: 2025-02-12
Payer: COMMERCIAL

## 2025-02-12 ENCOUNTER — HOSPITAL ENCOUNTER (OUTPATIENT)
Facility: HOSPITAL | Age: 55
Discharge: HOME | End: 2025-02-12
Admitting: NURSE PRACTITIONER
Payer: COMMERCIAL

## 2025-02-12 ENCOUNTER — OFFICE VISIT (OUTPATIENT)
Dept: FAMILY MEDICINE CLINIC | Facility: CLINIC | Age: 55
End: 2025-02-12
Payer: COMMERCIAL

## 2025-02-12 VITALS
HEART RATE: 88 BPM | SYSTOLIC BLOOD PRESSURE: 122 MMHG | HEIGHT: 67 IN | BODY MASS INDEX: 38.3 KG/M2 | WEIGHT: 244 LBS | TEMPERATURE: 97.6 F | DIASTOLIC BLOOD PRESSURE: 80 MMHG | OXYGEN SATURATION: 92 %

## 2025-02-12 DIAGNOSIS — Z51.81 ENCOUNTER FOR THERAPEUTIC DRUG MONITORING: ICD-10-CM

## 2025-02-12 DIAGNOSIS — E53.8 VITAMIN B12 DEFICIENCY: ICD-10-CM

## 2025-02-12 DIAGNOSIS — R39.16 BENIGN PROSTATIC HYPERPLASIA (BPH) WITH STRAINING ON URINATION: ICD-10-CM

## 2025-02-12 DIAGNOSIS — N40.1 BENIGN PROSTATIC HYPERPLASIA (BPH) WITH STRAINING ON URINATION: ICD-10-CM

## 2025-02-12 DIAGNOSIS — F41.8 DEPRESSION WITH ANXIETY: ICD-10-CM

## 2025-02-12 DIAGNOSIS — R73.09 ABNORMAL GLUCOSE: ICD-10-CM

## 2025-02-12 DIAGNOSIS — E55.9 VITAMIN D DEFICIENCY: ICD-10-CM

## 2025-02-12 DIAGNOSIS — R56.9 SEIZURES: ICD-10-CM

## 2025-02-12 DIAGNOSIS — Z98.890 S/P ACHILLES TENDON REPAIR: Primary | ICD-10-CM

## 2025-02-12 DIAGNOSIS — R35.0 FREQUENCY OF MICTURITION: ICD-10-CM

## 2025-02-12 DIAGNOSIS — Z79.899 ENCOUNTER FOR LONG-TERM (CURRENT) USE OF MEDICATIONS: ICD-10-CM

## 2025-02-12 DIAGNOSIS — I10 ESSENTIAL HYPERTENSION: Primary | ICD-10-CM

## 2025-02-12 DIAGNOSIS — E78.2 MIXED HYPERLIPIDEMIA: ICD-10-CM

## 2025-02-12 LAB
ALBUMIN SERPL-MCNC: 4.4 G/DL (ref 3.5–5.2)
ALBUMIN/GLOB SERPL: 1.3 G/DL
ALP SERPL-CCNC: 104 U/L (ref 39–117)
ALT SERPL W P-5'-P-CCNC: 26 U/L (ref 1–41)
ANION GAP SERPL CALCULATED.3IONS-SCNC: 11.3 MMOL/L (ref 5–15)
AST SERPL-CCNC: 23 U/L (ref 1–40)
BASOPHILS # BLD AUTO: 0.02 10*3/MM3 (ref 0–0.2)
BASOPHILS NFR BLD AUTO: 0.3 % (ref 0–1.5)
BILIRUB SERPL-MCNC: 0.2 MG/DL (ref 0–1.2)
BUN SERPL-MCNC: 15 MG/DL (ref 6–20)
BUN/CREAT SERPL: 16.1 (ref 7–25)
CALCIUM SPEC-SCNC: 9.1 MG/DL (ref 8.6–10.5)
CHLORIDE SERPL-SCNC: 103 MMOL/L (ref 98–107)
CO2 SERPL-SCNC: 24.7 MMOL/L (ref 22–29)
CREAT SERPL-MCNC: 0.93 MG/DL (ref 0.76–1.27)
DEPRECATED RDW RBC AUTO: 46.5 FL (ref 37–54)
EGFRCR SERPLBLD CKD-EPI 2021: 98.8 ML/MIN/1.73
EOSINOPHIL # BLD AUTO: 0.09 10*3/MM3 (ref 0–0.4)
EOSINOPHIL NFR BLD AUTO: 1.5 % (ref 0.3–6.2)
ERYTHROCYTE [DISTWIDTH] IN BLOOD BY AUTOMATED COUNT: 13.2 % (ref 12.3–15.4)
GLOBULIN UR ELPH-MCNC: 3.4 GM/DL
GLUCOSE SERPL-MCNC: 95 MG/DL (ref 65–99)
HBA1C MFR BLD: 5.1 % (ref 4.8–5.6)
HCT VFR BLD AUTO: 43.7 % (ref 37.5–51)
HGB BLD-MCNC: 15.1 G/DL (ref 13–17.7)
IMM GRANULOCYTES # BLD AUTO: 0.01 10*3/MM3 (ref 0–0.05)
IMM GRANULOCYTES NFR BLD AUTO: 0.2 % (ref 0–0.5)
LYMPHOCYTES # BLD AUTO: 1.86 10*3/MM3 (ref 0.7–3.1)
LYMPHOCYTES NFR BLD AUTO: 30.7 % (ref 19.6–45.3)
MCH RBC QN AUTO: 32.7 PG (ref 26.6–33)
MCHC RBC AUTO-ENTMCNC: 34.6 G/DL (ref 31.5–35.7)
MCV RBC AUTO: 94.6 FL (ref 79–97)
MONOCYTES # BLD AUTO: 0.64 10*3/MM3 (ref 0.1–0.9)
MONOCYTES NFR BLD AUTO: 10.6 % (ref 5–12)
NEUTROPHILS NFR BLD AUTO: 3.44 10*3/MM3 (ref 1.7–7)
NEUTROPHILS NFR BLD AUTO: 56.7 % (ref 42.7–76)
NRBC BLD AUTO-RTO: 0 /100 WBC (ref 0–0.2)
PHENYTOIN SERPL-MCNC: 14.5 MCG/ML (ref 10–20)
PLATELET # BLD AUTO: 197 10*3/MM3 (ref 140–450)
PMV BLD AUTO: 10.9 FL (ref 6–12)
POTASSIUM SERPL-SCNC: 3.9 MMOL/L (ref 3.5–5.2)
PROT SERPL-MCNC: 7.8 G/DL (ref 6–8.5)
RBC # BLD AUTO: 4.62 10*6/MM3 (ref 4.14–5.8)
SODIUM SERPL-SCNC: 139 MMOL/L (ref 136–145)
T4 FREE SERPL-MCNC: 0.96 NG/DL (ref 0.92–1.68)
TSH SERPL DL<=0.05 MIU/L-ACNC: 2.66 UIU/ML (ref 0.27–4.2)
WBC NRBC COR # BLD AUTO: 6.06 10*3/MM3 (ref 3.4–10.8)

## 2025-02-12 PROCEDURE — 80053 COMPREHEN METABOLIC PANEL: CPT | Performed by: NURSE PRACTITIONER

## 2025-02-12 PROCEDURE — 80186 ASSAY OF PHENYTOIN FREE: CPT | Performed by: NURSE PRACTITIONER

## 2025-02-12 PROCEDURE — 1159F MED LIST DOCD IN RCRD: CPT | Performed by: NURSE PRACTITIONER

## 2025-02-12 PROCEDURE — 84439 ASSAY OF FREE THYROXINE: CPT | Performed by: NURSE PRACTITIONER

## 2025-02-12 PROCEDURE — 82306 VITAMIN D 25 HYDROXY: CPT | Performed by: NURSE PRACTITIONER

## 2025-02-12 PROCEDURE — 36415 COLL VENOUS BLD VENIPUNCTURE: CPT | Performed by: NURSE PRACTITIONER

## 2025-02-12 PROCEDURE — 83036 HEMOGLOBIN GLYCOSYLATED A1C: CPT | Performed by: NURSE PRACTITIONER

## 2025-02-12 PROCEDURE — 85025 COMPLETE CBC W/AUTO DIFF WBC: CPT | Performed by: NURSE PRACTITIONER

## 2025-02-12 PROCEDURE — 1125F AMNT PAIN NOTED PAIN PRSNT: CPT | Performed by: NURSE PRACTITIONER

## 2025-02-12 PROCEDURE — 84443 ASSAY THYROID STIM HORMONE: CPT | Performed by: NURSE PRACTITIONER

## 2025-02-12 PROCEDURE — 82607 VITAMIN B-12: CPT | Performed by: NURSE PRACTITIONER

## 2025-02-12 PROCEDURE — 1160F RVW MEDS BY RX/DR IN RCRD: CPT | Performed by: NURSE PRACTITIONER

## 2025-02-12 PROCEDURE — 3079F DIAST BP 80-89 MM HG: CPT | Performed by: NURSE PRACTITIONER

## 2025-02-12 PROCEDURE — 3074F SYST BP LT 130 MM HG: CPT | Performed by: NURSE PRACTITIONER

## 2025-02-12 PROCEDURE — 99214 OFFICE O/P EST MOD 30 MIN: CPT | Performed by: NURSE PRACTITIONER

## 2025-02-12 PROCEDURE — 80185 ASSAY OF PHENYTOIN TOTAL: CPT | Performed by: NURSE PRACTITIONER

## 2025-02-12 RX ORDER — LISINOPRIL 30 MG/1
30 TABLET ORAL DAILY
Qty: 90 TABLET | Refills: 1 | Status: SHIPPED | OUTPATIENT
Start: 2025-02-12

## 2025-02-12 RX ORDER — BUPROPION HYDROCHLORIDE 150 MG/1
150 TABLET ORAL DAILY
Qty: 30 TABLET | Refills: 0 | Status: SHIPPED | OUTPATIENT
Start: 2025-02-12

## 2025-02-12 RX ORDER — TAMSULOSIN HYDROCHLORIDE 0.4 MG/1
1 CAPSULE ORAL DAILY
Qty: 90 CAPSULE | Refills: 3 | Status: SHIPPED | OUTPATIENT
Start: 2025-02-12

## 2025-02-12 NOTE — PROGRESS NOTES
Subjective   Jaquan Mckay is a 54 y.o. male.     Chief Complaint   Patient presents with    Psychophysiological insomnia       History of Present Illness       Foot pain-on the right.  He is in walking boot.  He did have stitches removed yesterday per ortho.  He reports that he hit his ankle last night while changing his clothing.  He had a small amount of bleeding over his mid ankle region.  He has been using footwear when he ambulates.  He is clear for weight bearing as long as he has boot on.    Sleep-increase in restoril at last appt.  He reports that he can still skip nights of sleep.  He has difficulty falling asleep. And staying asleep.  This is a hard time of year for him with his mental health. He is tolerating Wellbutrin without difficulty.  He also continues remeron.    HTN-stable on lisinopril 30 mg.  No recent CP. He continues ASA 81 mg.  No palpitations.   Hyperlipidemia-continues repatha and is tolerating well.  He is also on Crestor 40 mg.  No negative side effects.  Patient denies any negative side effects of cholesterol medication.  No reported myalgia or myopathies.  Seizure disorder-ongoing.  On dilantin.  He denies any negative side effects.  He has not had any seizure symptoms.      The following portions of the patient's history were reviewed and updated as appropriate: CC, ROS, allergies, current medications, past family history, past medical history, past social history, past surgical history and problem list.      Review of Systems   Constitutional:  Positive for fatigue. Negative for appetite change, unexpected weight gain and unexpected weight loss.   HENT:  Negative for congestion, ear pain, postnasal drip, rhinorrhea, sore throat, swollen glands, trouble swallowing and voice change.    Eyes:  Negative for pain and visual disturbance.   Respiratory:  Negative for cough, chest tightness, shortness of breath and wheezing.    Cardiovascular:  Negative for chest pain, palpitations and leg  "swelling.   Gastrointestinal:  Negative for abdominal pain, blood in stool, constipation, diarrhea, nausea and indigestion.   Genitourinary:  Negative for dysuria, hematuria and urgency.   Musculoskeletal:  Positive for arthralgias, back pain, gait problem and myalgias. Negative for joint swelling.   Skin:  Negative for color change and skin lesions.   Allergic/Immunologic: Negative.    Neurological:  Negative for dizziness, numbness and headache.   Hematological: Negative.    Psychiatric/Behavioral:  Positive for dysphoric mood and stress. Negative for sleep disturbance and suicidal ideas. The patient is nervous/anxious.    All other systems reviewed and are negative.      Objective     /80   Pulse 88   Temp 97.6 °F (36.4 °C) (Temporal)   Ht 170.2 cm (67\")   Wt 111 kg (244 lb)   SpO2 92%   BMI 38.22 kg/m²     Physical Exam  Vitals reviewed.   Constitutional:       General: He is not in acute distress.     Appearance: He is well-developed. He is obese. He is not diaphoretic.   HENT:      Head: Normocephalic and atraumatic.      Jaw: No tenderness.      Right Ear: Hearing, tympanic membrane, ear canal and external ear normal.      Left Ear: Hearing, tympanic membrane, ear canal and external ear normal.      Nose: Nose normal. No nasal tenderness or congestion.      Right Sinus: No maxillary sinus tenderness or frontal sinus tenderness.      Left Sinus: No maxillary sinus tenderness or frontal sinus tenderness.      Mouth/Throat:      Lips: Pink.      Mouth: Mucous membranes are moist.      Pharynx: Oropharynx is clear. Uvula midline.   Eyes:      General: Lids are normal. No scleral icterus.     Extraocular Movements:      Right eye: Normal extraocular motion and no nystagmus.      Left eye: Normal extraocular motion and no nystagmus.      Conjunctiva/sclera: Conjunctivae normal.      Pupils: Pupils are equal, round, and reactive to light.   Neck:      Thyroid: No thyromegaly or thyroid tenderness.      " Vascular: No carotid bruit or JVD.      Trachea: No tracheal tenderness.   Cardiovascular:      Rate and Rhythm: Normal rate and regular rhythm.      Pulses:           Dorsalis pedis pulses are 2+ on the right side and 2+ on the left side.        Posterior tibial pulses are 2+ on the right side and 2+ on the left side.      Heart sounds: Normal heart sounds, S1 normal and S2 normal. No murmur heard.  Pulmonary:      Effort: Pulmonary effort is normal. No accessory muscle usage, prolonged expiration or respiratory distress.      Breath sounds: Normal breath sounds.   Chest:      Chest wall: No tenderness.   Abdominal:      General: Bowel sounds are normal. There is no distension.      Palpations: Abdomen is soft. There is no hepatomegaly, splenomegaly or mass.      Tenderness: There is no abdominal tenderness.   Musculoskeletal:         General: Tenderness present.      Cervical back: Normal range of motion and neck supple.      Lumbar back: Tenderness present.      Right lower leg: No edema.      Left lower leg: No edema.      Right ankle: Tenderness present. Decreased range of motion.      Left ankle: Tenderness present. Decreased range of motion.      Right foot: Decreased range of motion. Normal capillary refill. Swelling and tenderness present.      Left foot: Decreased range of motion. Normal capillary refill. Tenderness present.      Comments: No muscular atrophy or flaccidity.  Left foot with brace for support  Right foot in walking boot.  Toes warm.  ROM in toes WNL.  Mild edema  Cleaned with NS and gauze.  Incisions intact.  Skin on heel intact.    Lymphadenopathy:      Head:      Right side of head: No submental or submandibular adenopathy.      Left side of head: No submental or submandibular adenopathy.      Cervical: No cervical adenopathy.      Right cervical: No superficial cervical adenopathy.     Left cervical: No superficial cervical adenopathy.   Skin:     General: Skin is warm and dry.       Capillary Refill: Capillary refill takes less than 2 seconds.      Coloration: Skin is not jaundiced or pale.      Findings: No erythema.      Nails: There is no clubbing.   Neurological:      Mental Status: He is alert and oriented to person, place, and time.      Cranial Nerves: No cranial nerve deficit or facial asymmetry.      Sensory: No sensory deficit.      Motor: No weakness, tremor, atrophy or abnormal muscle tone.      Coordination: Coordination normal.      Gait: Gait abnormal (moderate antalgia).      Deep Tendon Reflexes: Reflexes are normal and symmetric.   Psychiatric:         Attention and Perception: He is attentive.         Mood and Affect: Mood normal. Mood is not anxious or depressed.         Speech: Speech normal.         Behavior: Behavior normal. Behavior is cooperative.         Thought Content: Thought content normal.         Cognition and Memory: Cognition normal.         Judgment: Judgment normal.         Diagnoses and all orders for this visit:    1. Essential hypertension (Primary)  Assessment & Plan:  Continue lisinopril 30 mg.  Continue under the care of cardiology.  Ambulatory BP monitoring either at home or random community checks.  Patient to report continued elevations >140/90.  Patient may come by office for checks if needed.       Orders:  -     CBC & Differential  -     Comprehensive Metabolic Panel  -     lisinopril (PRINIVIL,ZESTRIL) 30 MG tablet; Take 1 tablet by mouth Daily for blood pressure.  Dispense: 90 tablet; Refill: 1    2. Mixed hyperlipidemia  Assessment & Plan:  Continue Crestor 40 mg  and continue Repatha injections  We will continue to monitor Lipid panel      3. Benign prostatic hyperplasia (BPH) with straining on urination  -     tamsulosin (FLOMAX) 0.4 MG capsule 24 hr capsule; Take 1 capsule by mouth Daily.  Dispense: 90 capsule; Refill: 3    4. Seizures  Assessment & Plan:  Continue Dilantin  Report any new symptoms.  Report any side effects of  medications    Orders:  -     Phenytoin level, free  -     Phenytoin level, total    5. Vitamin B12 deficiency  -     Vitamin B12    6. Depression with anxiety  -     buPROPion XL (Wellbutrin XL) 150 MG 24 hr tablet; Take 1 tablet by mouth Daily.  Dispense: 30 tablet; Refill: 0    7. Frequency of micturition  -     tamsulosin (FLOMAX) 0.4 MG capsule 24 hr capsule; Take 1 capsule by mouth Daily.  Dispense: 90 capsule; Refill: 3    8. Abnormal glucose  -     TSH  -     Hemoglobin A1c  -     T4, Free    9. Vitamin D deficiency  Assessment & Plan:  Continue vitamin D as directed.  Report any negative side effects.  We will continue to monitor vitamin D labs and adjust supplement if necessary.      Orders:  -     Vitamin D,25-Hydroxy    10. Encounter for therapeutic drug monitoring  -     Urine Drug Screen - Urine, Clean Catch; Future    11. Encounter for long-term (current) use of medications  -     Urine Drug Screen - Urine, Clean Catch; Future       Understands disease processes and need for medications.  Understands reasons for urgent and emergent care.  Patient (& family) verbalized agreement for treatment plan.   Emotional support and active listening provided.  Patient provided time to verbalize feelings.    CHAVEZ/PMSHAUNA reviewed today and consistent.  Will refill prescribed controlled medication today.  Patient is aware they cannot receive narcotics from any other provider except if under care of pain management or speciality clinic.  Risk and benefits of medication use has been reviewed.  History and physical exam exhibit continued safe and appropriate use of controlled substances.  The patient is aware of the potential for addiction and dependence.  This patient has been made aware of the appropriate use of such medications, including potential risk of somnolence, limited ability to drive and / or work safely, and potential for overdose.    It has also been made clear that these medications are for use by this  patient only, without concomitant use of alcohol or other substances unless prescribed/advised by medical provider.  Patient understands they may be subject to UDS and pill counts at random.    Patient considered to be moderate risk for addiction due to use of multiple controlled medications.  Patient understands and accepts these risks.  Patient need for medication will be reassessed at each visit.  Doses will be adjusted according to patient need and findings.    Goal of TX: Patient will not have any adverse reactions of medication.  Patient will have improvement in sleep hygiene/pattern with use of Restoril as needed as directed.    CHAVEZ Patient Controlled Substance Report (from 2/13/2024 to 2/12/2025)    Dispensed  Strength Quantity Days Supply Provider Pharmacy   02/03/2025 Temazepam 30MG 30 each 30 [facility]UPINDERLENNOX [facility]...   01/23/2025 Hydrocodone/Acetaminophen 325MG/7.5MG 25 each 7 [facility]...   01/23/2025 Temazepam 15MG 30 each 30 [facility]...   12/27/2024 Hydrocodone Bitartrate/Ac 325MG/10MG 35 each 9 [facility]...        RTC 1 month, sooner if needed.           This document has been electronically signed by:  BALDOMERO Thao FNP-C Dragon disclaimer:  Part of this note may be an electronic transcription/translation of spoken language to printed text using the Dragon Dictation System.

## 2025-02-13 ENCOUNTER — HOSPITAL ENCOUNTER (OUTPATIENT)
Dept: PHYSICAL THERAPY | Facility: HOSPITAL | Age: 55
Setting detail: THERAPIES SERIES
Discharge: HOME | End: 2025-02-13
Payer: COMMERCIAL

## 2025-02-13 DIAGNOSIS — R26.9 IMPAIRED GAIT: ICD-10-CM

## 2025-02-13 DIAGNOSIS — Z98.890 HISTORY OF ANKLE SURGERY: ICD-10-CM

## 2025-02-13 DIAGNOSIS — M25.571 CHRONIC PAIN OF RIGHT ANKLE: Primary | ICD-10-CM

## 2025-02-13 DIAGNOSIS — G89.29 CHRONIC PAIN OF RIGHT ANKLE: Primary | ICD-10-CM

## 2025-02-13 LAB
25(OH)D3 SERPL-MCNC: 47.1 NG/ML (ref 30–100)
VIT B12 BLD-MCNC: 599 PG/ML (ref 211–946)

## 2025-02-13 PROCEDURE — 97162 PT EVAL MOD COMPLEX 30 MIN: CPT | Performed by: PHYSICAL THERAPIST

## 2025-02-13 NOTE — THERAPY EVALUATION
Physical Therapy Initial Evaluation and Plan of Care    Patient: Jaquan Mckay   : 1970  Diagnosis/ICD-10 Code:    Referring practitioner: Khurram Florez DPM  Date of Initial Visit: 2025  Today's Date: 2025  Patient seen for 1 session         Visit Diagnoses:    ICD-10-CM ICD-9-CM   1. Chronic pain of right ankle  M25.571 719.47    G89.29 338.29   2. Impaired gait  R26.9 781.2         Subjective Questionnaire: LEFS: 90%      Subjective Evaluation    History of Present Illness  Onset date: May 2024.  Date of surgery: 2024  Mechanism of injury: The patient sprained his right ankle while playing basketball around May of 2024 and re injured the ankle in 2024.  He was evaluated by MD Florez and was scheduled for surgery on 2025.  He received a Bromstrom and achilles repair.  The patient has been immobilized for the past two months and has now been referred to therapy for rehab.  He had his stitches removed two days ago.  The patient suffered a fall two days ago and was checked by his PCP and  was cleared to start therapy.  The patient reports having no change in pain today.      Precautions and Work Restrictions: per protocol;  pt must wear boot with walkingQuality of life: good    Pain  Current pain ratin  At best pain ratin  At worst pain rating: 10  Location: right ankle  Quality: sharp  Relieving factors: medications and rest  Aggravating factors: lifting, movement, stairs, standing, ambulation and squatting    Hand dominance: right    Patient Goals  Patient goals for therapy: decreased edema, decreased pain, improved balance, increased motion, increased strength, independence with ADLs/IADLs, return to work and return to sport/leisure activities             Objective          Observations     Additional Ankle/Foot Observation Details  Redness noted on right foot with edema along lateral mal and medial ma; incisions demonstrated good healing  Pt instructed in signs of  infection    Palpation     Right Tenderness of the anterior tibialis, lateral gastrocnemius, medial gastrocnemius, peroneus and posterior tibialis.     Tenderness     Right Ankle/Foot   Tenderness in the Achilles insertion, deltoid ligament, lateral malleolus, medial malleolus, peroneal tendon, plantar fascia, talar dome and tarsals.     Neurological Testing     Sensation     Ankle/Foot   Left Ankle/Foot   Intact: light touch    Right Ankle/Foot   Diminished: light touch    Comments   Right light touch: right foot and gastroc.     Active Range of Motion   Left Ankle/Foot   Dorsiflexion (ke): 5 degrees   Plantar flexion: 65 degrees     Right Ankle/Foot   Plantar flexion: 38 degrees     Additional Active Range of Motion Details  Right DF lacks 20degrees from neutral    Ambulation     Comments   Pt amb with right boot and walker with decreased stance on right          Assessment & Plan       Assessment  Impairments: abnormal coordination, abnormal gait, abnormal muscle firing, abnormal muscle tone, abnormal or restricted ROM, activity intolerance, impaired balance, impaired physical strength, lacks appropriate home exercise program, pain with function and weight-bearing intolerance   Functional limitations: lifting, walking, uncomfortable because of pain, standing and unable to perform repetitive tasks   Assessment details: Pt is a 53 y/o male referred to therapy for treatment following a right Brostrom repair and achilles repair.  Pt presents with decreased ROM, increased pain and impaired gait.  Will follow for improved ankle stability and improved function.  Prognosis: good    Goals  Plan Goals: STG 6 weeks    1 Pt will be instructed in a HEP.  2 Pt will improve his LEFs to less than 50%  3 Pt will demonstrate 5 degrees from neutral DF.    LTG 12 weeks    1 Pt will demonstrate 4/5 gross right ankle strength.  2 Pt will improve his LEFs to less than 25%.  3 Pt will amb with improved stance on right LE with no AD.  4  Pt will improve his right ankle ROM from 0 DF to 65 degree PF.    Plan  Therapy options: will be seen for skilled therapy services  Planned modality interventions: cryotherapy, TENS, thermotherapy (hydrocollator packs) and ultrasound  Planned therapy interventions: ADL retraining, balance/weight-bearing training, body mechanics training, flexibility, functional ROM exercises, gait training, home exercise program, IADL retraining, joint mobilization, manual therapy, motor coordination training, neuromuscular re-education, postural training, soft tissue mobilization, spinal/joint mobilization, strengthening, stretching and therapeutic activities  Frequency: 2x week  Duration in weeks: 12  Treatment plan discussed with: patient  Plan details: Will follow for optimal gains.  Moderate Evaluation  43050  Re-evaluation   56444  Therapeutic exercise  52258  Therapeutic activity    17925  Neuromuscular re-education   46588  Manual therapy   08857  Gait training  42513    Unattended e-stim (Medicaid/Medicare)     Moist heat/cryotherapy 83815   Ultrasound   13492              Timed:         Manual Therapy:         mins  58835;     Therapeutic Exercise:         mins  46677;     Neuromuscular Jazmin:        mins  92212;    Therapeutic Activity:          mins  37536;     Gait Training:           mins  36533;     Ultrasound:          mins  79893;    Ionto                                   mins   16474  Self Care                            mins   55117  Canalith Repos         mins 19822      Un-Timed:  Electrical Stimulation:         mins  50866 ( );  Dry Needling          mins self-pay  Traction          mins 00109  Low Eval          Mins  80158  Mod Eval     55     Mins  89251  High Eval                            Mins  61902        Timed Treatment:      mins   Total Treatment:     55   mins          PT: Win Rodriguez, PT     License Number: [id number]  Electronically signed by Win Rodriguez PT, 02/13/25,  11:09 AM EST    Certification Period: 2/13/2025 thru 5/13/2025  I certify that the therapy services are furnished while this patient is under my care.  The services outlined above are required by this patient, and will be reviewed every 90 days.         Physician Signature:__________________________________________________    PHYSICIAN: Khurram Florez DPM  NPI: [id number]                                      DATE:      Please sign and return via fax to [email or web address] . Thank you, [facility].

## 2025-02-17 ENCOUNTER — SPECIALTY PHARMACY (OUTPATIENT)
Dept: PHARMACY | Facility: HOSPITAL | Age: 55
End: 2025-02-17
Payer: COMMERCIAL

## 2025-02-17 LAB — PHENYTOIN FREE SERPL-MCNC: 0.9 UG/ML (ref 1–2)

## 2025-02-17 NOTE — PROGRESS NOTES
Specialty Pharmacy Patient Management Program  Refill Outreach     Jaquan was contacted today regarding refills of their medication(s).    Refill Questions      Flowsheet Row Most Recent Value   Changes to allergies? No   Changes to medications? No   New conditions or infections since last clinic visit No   Unplanned office visit, urgent care, ED, or hospital admission in the last 4 weeks  No   How does patient/caregiver feel medication is working? Good   Financial problems or insurance changes  No   Since the previous refill, were any specialty medication doses or scheduled injections missed or delayed?  No   Does this patient require a clinical escalation to a pharmacist? No            Delivery Questions      Flowsheet Row Most Recent Value   Delivery method  at Pharmacy   Delivery address Prescription   Copay verified? Yes   Copay amount $0   Copay form of payment No copayment ($0)   Signature Required No                 Follow-up: 28 day(s)     Ruth Yip, Pharmacy Technician  2/17/2025  15:50 EST

## 2025-02-18 ENCOUNTER — HOSPITAL ENCOUNTER (OUTPATIENT)
Dept: PHYSICAL THERAPY | Facility: HOSPITAL | Age: 55
Setting detail: THERAPIES SERIES
Discharge: HOME | End: 2025-02-18
Payer: COMMERCIAL

## 2025-02-18 DIAGNOSIS — M25.571 CHRONIC PAIN OF RIGHT ANKLE: Primary | ICD-10-CM

## 2025-02-18 DIAGNOSIS — G89.29 CHRONIC PAIN OF RIGHT ANKLE: Primary | ICD-10-CM

## 2025-02-18 DIAGNOSIS — R26.9 IMPAIRED GAIT: ICD-10-CM

## 2025-02-18 DIAGNOSIS — Z98.890 HISTORY OF ANKLE SURGERY: ICD-10-CM

## 2025-02-18 PROCEDURE — 97110 THERAPEUTIC EXERCISES: CPT | Performed by: PHYSICAL THERAPIST

## 2025-02-18 RX ORDER — IPRATROPIUM BROMIDE AND ALBUTEROL 20; 100 UG/1; UG/1
SPRAY, METERED RESPIRATORY (INHALATION)
Qty: 4 G | Refills: 5 | Status: SHIPPED | OUTPATIENT
Start: 2025-02-18

## 2025-02-18 NOTE — THERAPY EVALUATION
Physical Therapy Daily Treatment Note      Patient: Jaquan Mckay   : 1970  Referring practitioner: Khurram Florez DPM  Date of Initial Visit: Type: THERAPY  Episode: S/P Right Brostrom/achilles repair  Today's Date: 2025  Patient seen for 2 sessions       Visit Diagnoses:    ICD-10-CM ICD-9-CM   1. Chronic pain of right ankle  M25.571 719.47    G89.29 338.29   2. Impaired gait  R26.9 781.2   3. History of ankle surgery  Z98.890 V45.89       Subjective Evaluation    History of Present Illness    Subjective comment: Pt reports having 8/10 pain today.       Objective   See Exercise, Manual, and Modality Logs for complete treatment.       Assessment & Plan       Assessment  Assessment details: Tx today consisted of exercises for improved ankle mobility and leg stability with conservative exercises.  Tx initiated with mh and ended with ice.  Pt reported 5/10 post pain.    Plan  Plan details: Will follow progressing ankle mobility and function as ordered by MD.            Timed:         Manual Therapy:         mins  24970;     Therapeutic Exercise:    25     mins  68342;     Neuromuscular Jazmin:        mins  37569;    Therapeutic Activity:          mins  31995;     Gait Training:           mins  67068;     Ultrasound:          mins  52880;    Ionto                                   mins   55009  Self Care                            mins   12199  Canalith Repos         mins 09644      Un-Timed:  Electrical Stimulation:         mins  70321 ( );  Dry Needling          mins self-pay  Traction          mins 93293      Timed Treatment:   25   mins   Total Treatment:     35   mins(5min mh and 5 min ice)    Win Rodriguez PT  [place] License: [id number]      Electronically signed by Win Rodriguez PT, 25, 8:38 AM EST

## 2025-02-21 ENCOUNTER — HOSPITAL ENCOUNTER (OUTPATIENT)
Dept: PHYSICAL THERAPY | Facility: HOSPITAL | Age: 55
Setting detail: THERAPIES SERIES
Discharge: HOME | End: 2025-02-21
Payer: COMMERCIAL

## 2025-02-21 DIAGNOSIS — R26.9 IMPAIRED GAIT: ICD-10-CM

## 2025-02-21 DIAGNOSIS — M25.571 CHRONIC PAIN OF RIGHT ANKLE: Primary | ICD-10-CM

## 2025-02-21 DIAGNOSIS — Z98.890 HISTORY OF ANKLE SURGERY: ICD-10-CM

## 2025-02-21 DIAGNOSIS — G89.29 CHRONIC PAIN OF RIGHT ANKLE: Primary | ICD-10-CM

## 2025-02-21 PROCEDURE — 97530 THERAPEUTIC ACTIVITIES: CPT | Performed by: PHYSICAL THERAPIST

## 2025-02-21 PROCEDURE — 97110 THERAPEUTIC EXERCISES: CPT | Performed by: PHYSICAL THERAPIST

## 2025-02-21 NOTE — THERAPY EVALUATION
Physical Therapy Daily Treatment Note      Patient: Jaquan Mckay   : 1970  Referring practitioner: Khurram Florez DPM  Date of Initial Visit: Type: THERAPY  Episode: S/P Right Brostrom/achilles repair  Today's Date: 2025  Patient seen for 3 sessions       Visit Diagnoses:    ICD-10-CM ICD-9-CM   1. Chronic pain of right ankle  M25.571 719.47    G89.29 338.29   2. Impaired gait  R26.9 781.2   3. History of ankle surgery  Z98.890 V45.89       Subjective Evaluation    History of Present Illness    Subjective comment: Pt has 9/10 pain today.       Objective   See Exercise, Manual, and Modality Logs for complete treatment.       Assessment & Plan       Assessment  Assessment details: Tx initiated with mh to ankle; followed by ankle and leg exercises for improved mobility and stability and ended with functional stepping and proprioceptive training.  Pt demonstrated good effort with difficulty with foam balance and pod step over due to foot pain.  Pt reported 7/10 post pain.    Plan  Plan details: Will follow progressing ankle stability and decreased pain.          Timed:         Manual Therapy:         mins  01577;     Therapeutic Exercise:    31     mins  98088;     Neuromuscular Jazmin:        mins  24454;    Therapeutic Activity:     11     mins  66045;     Gait Training:           mins  68276;     Ultrasound:          mins  83302;    Ionto                                   mins   04210  Self Care                            mins   70628  Canalith Repos         mins 16276      Un-Timed:  Electrical Stimulation:         mins  84648 ( );  Dry Needling          mins self-pay  Traction          mins 31099      Timed Treatment:   42   mins   Total Treatment:     47   mins(5min mh)    Win Rodriguez PT  [place] License: [id number]      Electronically signed by Win Rodriguez PT, 25, 8:33 AM EST

## 2025-02-25 ENCOUNTER — DISEASE STATE MANAGEMENT VISIT (OUTPATIENT)
Dept: PHARMACY | Facility: HOSPITAL | Age: 55
End: 2025-02-25
Payer: COMMERCIAL

## 2025-02-25 NOTE — PROGRESS NOTES
"   Medication Management Clinic/ Specialty Pharmacy Patient Management Program  Lipid Management Program - PCSK9i follow up Assessment     Jaquan Mckay is a 54 y.o. male referred by their provider, Tiera Valenzuela, to the Hyperlipidemia Patient Management program offered by [facility] for Lipid Management.  Jaquan Mckay is  treated for ASCVD and hyperlipidemia, and currently takes crestor 40 mg.  In the past, Pt has tried lipitor 40 mg, zocor 10 mg, pravastatin 80 mg and is not statin intolerant. The patient denies any allergies to latex.       A follow-up outreach was conducted, including assessment of continued therapy appropriateness, medication adherence, and side effect incidence and management for Repatha. Patient comes to clinic for injections and has not missed any doses.  Repatha was started on 10/29/24, and patient reports tolerating the medication well with no side effects.    He reports he tolerated last Repatha injection without any issues. He denies any side effects.     Changes to Insurance Coverage or Financial Support  [employer] [place] (no change)    Relevant Past Medical History and Comorbidities  Relevant medical history and concomitant health conditions were discussed with the patient. The patient's chart has been reviewed for relevant past medical history and comorbid health conditions and updated as necessary.   Past Medical History:   Diagnosis Date    Allergic     Anxiety     Arthritis     Asthma     Body piercing     REPORTS CYLICONE IN EARS    Clotting disorder 2004    had a knee surgery    Coronary artery disease     Depression     DVT (deep venous thrombosis)     RIGHT RIGHT KNEE AFTER SURGERY YEARS AGO IN 2001 OR 2004    Elevated cholesterol     Gastric ulcer     GERD (gastroesophageal reflux disease)     H/O migraine     Headache     Heart attack     REPORTS \"LIGHT HEART ATTACK A LONG TIME AGO\"  \"EARLY 90'S\"    History of " "seizures     REPORTS LAST EPISODE WAS AROUND 1995.    Hostility     Hyperlipidemia     Hypertension     Knee pain, acute     Left    Low back pain     Lyme disease     Migraine     MRSA (methicillin resistant Staphylococcus aureus)     REPORTS LAST TESTED + 2004. WAS TREATED HE REPORTS.  RIGHT ARM, RIGHT KNEE.    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     Obesity     Poor historian     [place] spotted fever     Seizures     Sleep apnea     Tattoo     Wears glasses      Social History     Socioeconomic History    Marital status:      Spouse name: Becca    Number of children: 2    Years of education: 12   Tobacco Use    Smoking status: Every Day     Current packs/day: 1.00     Average packs/day: 1 pack/day for 18.0 years (18.0 ttl pk-yrs)     Types: Cigars, Cigarettes     Start date: 4/11/2022     Passive exposure: Current    Smokeless tobacco: Never   Vaping Use    Vaping status: Never Used   Substance and Sexual Activity    Alcohol use: No    Drug use: No    Sexual activity: Defer     Partners: Female     Birth control/protection: None     Problem list reviewed by Kimberley Dinh RP on 2/25/2025 at  2:19 PM    Hospitalizations and Urgent Care Since Last Assessment  ED Visits, Admissions, or Hospitalizations: none  Urgent Office Visits: none    Allergies  Known allergies and reactions were discussed with the patient. The patient's chart has been reviewed for allergy information and updated as necessary.   Allergies   Allergen Reactions    Ciprofloxacin Anaphylaxis and Hives    Miralax [Polyethylene Glycol] Itching and Rash    Mobic [Meloxicam] Other (See Comments)     Pt states, \"It make my feet and hands go numb and I can't hardly walk.\"     Paxil [Paroxetine Hcl] Shortness Of Breath     Chest pain     Peanut-Containing Drug Products Anaphylaxis    Penicillins Anaphylaxis    Pristiq [Desvenlafaxine Succinate Er] Dizziness    Sulfa Antibiotics Anaphylaxis, Itching and Rash    Doxycycline Hives    Fish-Derived " Products Hives    Isosorbide Nitrate Rash     Rash, hives, had to use inhaler.     Lyrica [Pregabalin] Hives    Movantik [Naloxegol] Rash    Seroquel [Quetiapine] Hives and Rash    Trulance [Plecanatide] Hives    Buspar [Buspirone] Rash    Clarithromycin Rash    Clindamycin/Lincomycin Rash    Codeine Rash    Contrast Dye (Echo Or Unknown Ct/Mr) Itching and Rash    Diltiazem Rash    Flomax [Tamsulosin] Hives    Gabapentin Rash    Iodinated Contrast Media Itching and Rash    Keflex [Cephalexin] Rash    Levocetirizine Rash    Linzess [Linaclotide] Rash    Metoprolol Rash    Prednisone Rash and Other (See Comments)     Face, feet, and legs go completely numb per patient    Robitussin Cough+ Chest Max St [Dextromethorphan-Guaifenesin] Itching    Shrimp (Diagnostic) Rash    Spironolactone Rash    Viibryd [Vilazodone Hcl] Itching and Rash    Zoloft [Sertraline Hcl] Hives and Itching     Allergies reviewed by Kimberley Dinh RP on 2/25/2025 at  2:19 PM    Relevant Laboratory Values  Relevant laboratory values were discussed with the patient. The following specialty medication dose adjustment(s) are recommended: none    Lab Results   Component Value Date    GLUCOSE 95 02/12/2025    CALCIUM 9.1 02/12/2025     02/12/2025    K 3.9 02/12/2025    CO2 24.7 02/12/2025     02/12/2025    BUN 15 02/12/2025    CREATININE 0.93 02/12/2025    EGFRIFAFRI  08/26/2016      Comment:      <15 Indicative of kidney failure.    EGFRIFNONA 66 02/02/2022    BCR 16.1 02/12/2025    ANIONGAP 11.3 02/12/2025     Lab Results   Component Value Date    CHOL 144 01/31/2025    CHLPL 232 (H) 04/05/2016    TRIG 87 01/31/2025    HDL 64 (H) 01/31/2025    LDL 64 01/31/2025       Current Medication List  This medication list has been reviewed with the patient and evaluated for any interactions or necessary modifications/recommendations, and updated to include all prescription medications, OTC medications, and supplements the patient is currently  taking.  This list reflects what is contained in the patient's profile, which has also been marked as reviewed to communicate to other providers it is the most up to date version of the patient's current medication therapy.     Current Outpatient Medications:     albuterol sulfate  (90 Base) MCG/ACT inhaler, Inhale 2 puffs by mouth every 4 hours as needed., Disp: 18 g, Rfl: 3    Alcohol Swabs (Alcohol Prep) 70 % pads, Use 1 each 2 (Two) Times a Day., Disp: 60 each, Rfl: 5    aspirin (Aspirin EC Adult Low Dose) 81 MG EC tablet, Take 1 tablet by mouth Daily., Disp: 30 tablet, Rfl: 3    aspirin 325 MG tablet, Take 1 tablet by mouth Daily., Disp: 30 tablet, Rfl: 0    azelastine (ASTELIN) 0.1 % nasal spray, , Disp: , Rfl:     baclofen (LIORESAL) 20 MG tablet, Take 1 tablet by mouth 3 (Three) Times a Day., Disp: 15 tablet, Rfl: 0    buPROPion XL (Wellbutrin XL) 150 MG 24 hr tablet, Take 1 tablet by mouth Daily., Disp: 30 tablet, Rfl: 0    cloNIDine (Catapres) 0.1 MG tablet, Take 1 tablet by mouth 2 (Two) Times a Day As Needed for High Blood Pressure (if bp more than 140/90)., Disp: 60 tablet, Rfl: 1    dexlansoprazole (Dexilant) 60 MG capsule, Take 1 capsule by mouth 2 (Two) Times a Day., Disp: 60 capsule, Rfl: 5    Dilantin 100 MG capsule, Take 2 capsules by mouth 2 (Two) Times a Day., Disp: 120 capsule, Rfl: 5    diphenhydrAMINE (Benadryl Allergy) 25 MG tablet, Take 1-2 tablets by mouth Every 8 (Eight) Hours As Needed for Itching (or rash)., Disp: 120 tablet, Rfl: 2    docusate calcium (SURFAK) 240 MG capsule, Take 1 capsule by mouth 2 (Two) Times a Day As Needed for Constipation., Disp: 60 capsule, Rfl: 2    Elastic Bandages & Supports (ACE Ankle Brace) misc, 1 each Daily., Disp: 1 each, Rfl: 0    EPINEPHrine (EPIPEN) 0.3 MG/0.3ML solution auto-injector injection, Inject Intramuscularly into lateral thigh as needed for treatment of anaphylaxis, then go to the ER or call 911., Disp: 2 each, Rfl: 2     ergocalciferol (ERGOCALCIFEROL) 1.25 MG (44994 UT) capsule, Take 1 capsule by mouth 1 (One) Time Per Week., Disp: 4 capsule, Rfl: 3    Evolocumab (REPATHA) solution auto-injector SureClick injection, Inject 1 mL under the skin into the appropriate area as directed Every 14 (Fourteen) Days., Disp: 2 mL, Rfl: 5    fluticasone (FLONASE) 50 MCG/ACT nasal spray, Use 1 spray in each nostril Twice a day for 30 days, Disp: 16 g, Rfl: 11    fluticasone (Flovent HFA) 110 MCG/ACT inhaler, Inhale 1 puff by mouth 2 (Two) Times a Day., Disp: 12 g, Rfl: 5    fluticasone (FLOVENT HFA) 44 MCG/ACT inhaler, Inhale 1 puff by mouth twice a day., Disp: 10.6 g, Rfl: 5    glucose blood (OneTouch Ultra) test strip, Use as instructed 2 times daily and as needed, Disp: 100 each, Rfl: 3    HYDROcodone-acetaminophen (Norco) 7.5-325 MG per tablet, Take 1 tablet by mouth every 6 (six) hours as needed for pain for up to 10 days. Max Daily Amount: 4 tablets, Disp: 25 tablet, Rfl: 0    ibuprofen (ADVIL,MOTRIN) 800 MG tablet, Take 1 tablet by mouth 2 (Two) Times a Day As Needed., Disp: 40 tablet, Rfl: 0    ibuprofen (ADVIL,MOTRIN) 800 MG tablet, Take 1 tablet by mouth Every 6 (Six) Hours As Needed., Disp: 40 tablet, Rfl: 1    ipratropium-albuterol (Combivent Respimat)  MCG/ACT inhaler, INHALE 1 PUFF BY MOUTH 4 (FOUR) TIMES A DAY AS NEEDED FOR WHEEZING., Disp: 4 g, Rfl: 5    ipratropium-albuterol (DUO-NEB) 0.5-2.5 mg/3 ml nebulizer, Inhale the contents of 3 mL vial nebulizer every 6 hours as needed., Disp: 360 mL, Rfl: 3    Lancets (OneTouch Delica Plus Uhnnnu13Y) misc, Use as instructed 2 times daily and as needed, Disp: 100 each, Rfl: 3    levocetirizine (XYZAL) 5 MG tablet, Take 1 tablet by mouth in the evening Once a day 30 days, Disp: 30 tablet, Rfl: 11    lisinopril (PRINIVIL,ZESTRIL) 30 MG tablet, Take 1 tablet by mouth Daily for blood pressure., Disp: 90 tablet, Rfl: 1    mirtazapine (REMERON) 30 MG tablet, Take 1 & 1/2  tablets by mouth  Every Night., Disp: 45 tablet, Rfl: 5    montelukast (SINGULAIR) 10 MG tablet, Take 1 tablet by mouth., Disp: , Rfl:     multivitamin with minerals tablet tablet, Take 1 tablet by mouth Daily., Disp: 30 tablet, Rfl: 12    ondansetron (Zofran) 4 MG tablet, Take 1 tablet by mouth Every 8 (Eight) Hours As Needed for Nausea., Disp: 20 tablet, Rfl: 0    rosuvastatin (Crestor) 40 MG tablet, Take 1 tablet by mouth Daily., Disp: 30 tablet, Rfl: 3    sodium chloride 0.65 % nasal spray, Use 1-3 sprays in each nostril Three times a day as needed 30 days, Disp: 44 mL, Rfl: 11    tamsulosin (FLOMAX) 0.4 MG capsule 24 hr capsule, Take 1 capsule by mouth Daily., Disp: 90 capsule, Rfl: 3    temazepam (RESTORIL) 30 MG capsule, Take 1 capsule by mouth At Night As Needed for Sleep., Disp: 30 capsule, Rfl: 0    tiZANidine (Zanaflex) 4 MG tablet, Take 1 tablet by mouth 3 (Three) Times a Day., Disp: 30 tablet, Rfl: 5    vitamin C (ASCORBIC ACID) 500 MG tablet, Take 1 tablet by mouth 2 (Two) Times a Day., Disp: 60 tablet, Rfl: 1    Medicines reviewed by Kimberley Dinh [facility] on 2/25/2025 at  2:19 PM    Drug Interactions  No significant drug-drug interactions with Repatha according to literature.     Adverse Drug Reactions        Plan for ADR Management: n/a    Adherence, Self-Administration, and Current Therapy Problems  Adherence related to the patient's specialty therapy was discussed with the patient. The Adherence segment of this outreach has been reviewed and updated.          Additional Barriers to Patient Self-Administration: patient refuses to administer himself  Methods for Supporting Patient Self-Administration: patient comes to [facility] clinic every 2 weeks for injections    Open Medication Therapy Problems  No medication therapy recommendations to display    Goals of Therapy  Goals related to the patient's specialty therapy were discussed with the patient. The Patient Goals segment of this outreach has been reviewed and updated.    Goals Addressed Today        Specialty Pharmacy General Goal      LDL reduction less than 70          LDL was 136 mg/dl on 3/20/24 and has improved to 132 mg/ml on 10/17/24. This lab work was completed prior to starting repatha therapy.    Quality of Life Assessment   Quality of Life related to the patient's enrollment in the patient management program and services provided was discussed with the patient. The QOL segment of this outreach has been reviewed and updated.       Reassessment Plan & Follow-Up  1.Medication Therapy Changes: Patient will continue Repatha 140mg every 2 weeks    A. I administered injection in to the back of his RIGHT arm   2. Related Plans, Therapy Recommendations, or Issues to Be Addressed: none  3. Pharmacist to perform regular assessments no more than (6) months from the previous assessment.  Patient will continue regular follow-up with referring provider.   4. Care Coordinator to set up future refill outreaches, coordinate prescription delivery, and escalate clinical questions to pharmacist.      Attestation      I attest the patient was actively involved in and has agreed to the above plan of care.  If the prescribed therapy is at any point deemed not appropriate based on the current or future assessments, a consultation will be initiated with the patient's specialty care provider to determine the best course of action. The revised plan of therapy will be documented along with any required assessments and/or additional patient education provided.     Patient to follow up for injections in Clinic every 2 weeks    Kimberley Dinh RPH  2/25/2025  14:24 EST

## 2025-02-27 ENCOUNTER — HOSPITAL ENCOUNTER (OUTPATIENT)
Dept: PHYSICAL THERAPY | Facility: HOSPITAL | Age: 55
Setting detail: THERAPIES SERIES
Discharge: HOME | End: 2025-02-27
Payer: COMMERCIAL

## 2025-02-27 DIAGNOSIS — Z98.890 HISTORY OF ANKLE SURGERY: ICD-10-CM

## 2025-02-27 DIAGNOSIS — G89.29 CHRONIC PAIN OF RIGHT ANKLE: Primary | ICD-10-CM

## 2025-02-27 DIAGNOSIS — M25.571 CHRONIC PAIN OF RIGHT ANKLE: Primary | ICD-10-CM

## 2025-02-27 DIAGNOSIS — R26.9 IMPAIRED GAIT: ICD-10-CM

## 2025-02-27 PROCEDURE — 97110 THERAPEUTIC EXERCISES: CPT | Performed by: PHYSICAL THERAPIST

## 2025-02-27 NOTE — THERAPY TREATMENT NOTE
Physical Therapy Daily Treatment Note      Patient: Jaquan Mckay   : 1970  Referring practitioner: Khurram Florez DPM  Date of Initial Visit: Type: THERAPY  Episode: S/P Right Brostrom/achilles repair  Today's Date: 2025  Patient seen for 4 sessions       Visit Diagnoses:    ICD-10-CM ICD-9-CM   1. Chronic pain of right ankle  M25.571 719.47    G89.29 338.29   2. Impaired gait  R26.9 781.2   3. History of ankle surgery  Z98.890 V45.89       Subjective Evaluation    History of Present Illness    Subjective comment: Pt reports 7/10 right ankle pain prior to today's session. He states he follows-up with MD on 3/11/25.Pain  Current pain ratin         Objective   See Exercise, Manual, and Modality Logs for complete treatment.       Assessment & Plan       Assessment  Assessment details: Therapeutic exercises were performed for improved right ankle ROM and strength. Rest breaks were provided as needed secondary to fatigue and discomfort. Tactile and verbal cues were provided for proper form. Today's session was initiated with  and concluded with cryotherapy, with no skin irritation observed. The patient reported 6/10 right ankle pain following today's session.    Plan  Plan details: Progress as indicated to achieve max function.          Timed:         Manual Therapy:         mins  31657;     Therapeutic Exercise:    30     mins  39622;     Neuromuscular Jazmin:        mins  85670;    Therapeutic Activity:          mins  69878;     Gait Training:           mins  61301;     Ultrasound:          mins  15712;    Ionto                                   mins   38379  Self Care                            mins   65469  Canalith Repos         mins 05653      Un-Timed:  Electrical Stimulation:         mins  97495 ( );  Dry Needling          mins self-pay  Traction          mins [id number]      Timed Treatment:   30   mins   Total Treatment:     46   mins (8 minutes cryotherapy, 8 minutes moist heat)    Radha  Claudene Dalton, PT  [place] License: [id number]      Electronically signed by Ashley Claudene Dalton, PT, 02/27/25, 1:55 PM EST

## 2025-03-04 ENCOUNTER — HOSPITAL ENCOUNTER (OUTPATIENT)
Dept: PHYSICAL THERAPY | Facility: HOSPITAL | Age: 55
Setting detail: THERAPIES SERIES
Discharge: HOME | End: 2025-03-04
Payer: COMMERCIAL

## 2025-03-04 DIAGNOSIS — M25.571 CHRONIC PAIN OF RIGHT ANKLE: Primary | ICD-10-CM

## 2025-03-04 DIAGNOSIS — Z98.890 HISTORY OF ANKLE SURGERY: ICD-10-CM

## 2025-03-04 DIAGNOSIS — R26.9 IMPAIRED GAIT: ICD-10-CM

## 2025-03-04 DIAGNOSIS — G89.29 CHRONIC PAIN OF RIGHT ANKLE: Primary | ICD-10-CM

## 2025-03-04 PROCEDURE — 97112 NEUROMUSCULAR REEDUCATION: CPT | Performed by: PHYSICAL THERAPIST

## 2025-03-04 PROCEDURE — 97110 THERAPEUTIC EXERCISES: CPT | Performed by: PHYSICAL THERAPIST

## 2025-03-04 NOTE — THERAPY EVALUATION
Physical Therapy Daily Treatment Note      Patient: Jaquan Mckay   : 1970  Referring practitioner: Khurram Florez DPM  Date of Initial Visit: Type: THERAPY  Episode: S/P Right Brostrom/achilles repair  Today's Date: 3/4/2025  Patient seen for 5 sessions       Visit Diagnoses:    ICD-10-CM ICD-9-CM   1. Chronic pain of right ankle  M25.571 719.47    G89.29 338.29   2. Impaired gait  R26.9 781.2   3. History of ankle surgery  Z98.890 V45.89       Subjective Evaluation    History of Present Illness    Subjective comment: Pt reports he has had increased cramping in his right calf. The patient has 9/10 pain with no trauma reported.       Objective   See Exercise, Manual, and Modality Logs for complete treatment.       Assessment & Plan       Assessment  Assessment details: Tx today consisted of exercises for improved ankle mobility and stability with added ankle tb; followed by functional stepping and proprioceptive training in II bars and ended with ice.  Pt demonstrated good effort with added activities and reported 6/10 post pain.    Plan  Plan details: Will follow per protocol for improved ankle stability.          Timed:         Manual Therapy:         mins  50503;     Therapeutic Exercise:    28     mins  45100;     Neuromuscular Jazmin:    12    mins  60796;    Therapeutic Activity:          mins  25894;     Gait Training:           mins  12004;     Ultrasound:          mins  08521;    Ionto                                   mins   53958  Self Care                            mins   20031  Canalith Repos         mins 26663      Un-Timed:  Electrical Stimulation:         mins  82458 (MC );  Dry Needling          mins self-pay  Traction          mins 71130      Timed Treatment:   40   mins   Total Treatment:     52   mins(6min mh and 6 min ice)    Win Rodriguez PT  [place] License: [id number]      Electronically signed by Win Rodriguez PT, 25, 11:01 AM EST

## 2025-03-06 ENCOUNTER — HOSPITAL ENCOUNTER (OUTPATIENT)
Dept: PHYSICAL THERAPY | Facility: HOSPITAL | Age: 55
Setting detail: THERAPIES SERIES
Discharge: HOME | End: 2025-03-06
Payer: COMMERCIAL

## 2025-03-06 DIAGNOSIS — Z98.890 HISTORY OF ANKLE SURGERY: ICD-10-CM

## 2025-03-06 DIAGNOSIS — M25.571 CHRONIC PAIN OF RIGHT ANKLE: Primary | ICD-10-CM

## 2025-03-06 DIAGNOSIS — G89.29 CHRONIC PAIN OF RIGHT ANKLE: Primary | ICD-10-CM

## 2025-03-06 DIAGNOSIS — R26.9 IMPAIRED GAIT: ICD-10-CM

## 2025-03-06 PROCEDURE — 97110 THERAPEUTIC EXERCISES: CPT | Performed by: PHYSICAL THERAPIST

## 2025-03-06 PROCEDURE — 97530 THERAPEUTIC ACTIVITIES: CPT | Performed by: PHYSICAL THERAPIST

## 2025-03-10 ENCOUNTER — DISEASE STATE MANAGEMENT VISIT (OUTPATIENT)
Dept: PHARMACY | Facility: HOSPITAL | Age: 55
End: 2025-03-10
Payer: COMMERCIAL

## 2025-03-10 NOTE — PROGRESS NOTES
"   Medication Management Clinic/ Specialty Pharmacy Patient Management Program  Lipid Management Program - PCSK9i follow up Assessment     Jaquan Mckay is a 54 y.o. male referred by their provider, Tiera Valenzuela, to the Hyperlipidemia Patient Management program offered by [facility] for Lipid Management.  Jaquan Mckay is  treated for ASCVD and hyperlipidemia, and currently takes crestor 40 mg.  In the past, Pt has tried lipitor 40 mg, zocor 10 mg, pravastatin 80 mg and is not statin intolerant. The patient denies any allergies to latex.       A follow-up outreach was conducted, including assessment of continued therapy appropriateness, medication adherence, and side effect incidence and management for Repatha. Patient comes to clinic for injections and has not missed any doses.  Repatha was started on 10/29/24, and patient reports tolerating the medication well with no side effects.    He reports he tolerated last Repatha injection without any issues. He denies any side effects.     Changes to Insurance Coverage or Financial Support  [employer] [place] (no change)    Relevant Past Medical History and Comorbidities  Relevant medical history and concomitant health conditions were discussed with the patient. The patient's chart has been reviewed for relevant past medical history and comorbid health conditions and updated as necessary.   Past Medical History:   Diagnosis Date    Allergic     Anxiety     Arthritis     Asthma     Body piercing     REPORTS CYLICONE IN EARS    Clotting disorder 2004    had a knee surgery    Coronary artery disease     Depression     DVT (deep venous thrombosis)     RIGHT RIGHT KNEE AFTER SURGERY YEARS AGO IN 2001 OR 2004    Elevated cholesterol     Gastric ulcer     GERD (gastroesophageal reflux disease)     H/O migraine     Headache     Heart attack     REPORTS \"LIGHT HEART ATTACK A LONG TIME AGO\"  \"EARLY 90'S\"    History of " "seizures     REPORTS LAST EPISODE WAS AROUND 1995.    Hostility     Hyperlipidemia     Hypertension     Knee pain, acute     Left    Low back pain     Lyme disease     Migraine     MRSA (methicillin resistant Staphylococcus aureus)     REPORTS LAST TESTED + 2004. WAS TREATED HE REPORTS.  RIGHT ARM, RIGHT KNEE.    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     Obesity     Poor historian     [place] spotted fever     Seizures     Sleep apnea     Tattoo     Wears glasses      Social History     Socioeconomic History    Marital status:      Spouse name: Becca    Number of children: 2    Years of education: 12   Tobacco Use    Smoking status: Every Day     Current packs/day: 1.00     Average packs/day: 1 pack/day for 18.0 years (18.0 ttl pk-yrs)     Types: Cigars, Cigarettes     Start date: 4/11/2022     Passive exposure: Current    Smokeless tobacco: Never   Vaping Use    Vaping status: Never Used   Substance and Sexual Activity    Alcohol use: No    Drug use: No    Sexual activity: Defer     Partners: Female     Birth control/protection: None          Hospitalizations and Urgent Care Since Last Assessment  ED Visits, Admissions, or Hospitalizations: none  Urgent Office Visits: none    Allergies  Known allergies and reactions were discussed with the patient. The patient's chart has been reviewed for allergy information and updated as necessary.   Allergies   Allergen Reactions    Ciprofloxacin Anaphylaxis and Hives    Miralax [Polyethylene Glycol] Itching and Rash    Mobic [Meloxicam] Other (See Comments)     Pt states, \"It make my feet and hands go numb and I can't hardly walk.\"     Paxil [Paroxetine Hcl] Shortness Of Breath     Chest pain     Peanut-Containing Drug Products Anaphylaxis    Penicillins Anaphylaxis    Pristiq [Desvenlafaxine Succinate Er] Dizziness    Sulfa Antibiotics Anaphylaxis, Itching and Rash    Doxycycline Hives    Fish-Derived Products Hives    Isosorbide Nitrate Rash     Rash, hives, had to use " inhaler.     Lyrica [Pregabalin] Hives    Movantik [Naloxegol] Rash    Seroquel [Quetiapine] Hives and Rash    Trulance [Plecanatide] Hives    Buspar [Buspirone] Rash    Clarithromycin Rash    Clindamycin/Lincomycin Rash    Codeine Rash    Contrast Dye (Echo Or Unknown Ct/Mr) Itching and Rash    Diltiazem Rash    Flomax [Tamsulosin] Hives    Gabapentin Rash    Iodinated Contrast Media Itching and Rash    Keflex [Cephalexin] Rash    Levocetirizine Rash    Linzess [Linaclotide] Rash    Metoprolol Rash    Prednisone Rash and Other (See Comments)     Face, feet, and legs go completely numb per patient    Robitussin Cough+ Chest Max St [Dextromethorphan-Guaifenesin] Itching    Shrimp (Diagnostic) Rash    Spironolactone Rash    Viibryd [Vilazodone Hcl] Itching and Rash    Zoloft [Sertraline Hcl] Hives and Itching          Relevant Laboratory Values  Relevant laboratory values were discussed with the patient. The following specialty medication dose adjustment(s) are recommended: none    Lab Results   Component Value Date    GLUCOSE 95 02/12/2025    CALCIUM 9.1 02/12/2025     02/12/2025    K 3.9 02/12/2025    CO2 24.7 02/12/2025     02/12/2025    BUN 15 02/12/2025    CREATININE 0.93 02/12/2025    EGFRIFAFRI  08/26/2016      Comment:      <15 Indicative of kidney failure.    EGFRIFNONA 66 02/02/2022    BCR 16.1 02/12/2025    ANIONGAP 11.3 02/12/2025     Lab Results   Component Value Date    CHOL 144 01/31/2025    CHLPL 232 (H) 04/05/2016    TRIG 87 01/31/2025    HDL 64 (H) 01/31/2025    LDL 64 01/31/2025       Current Medication List  This medication list has been reviewed with the patient and evaluated for any interactions or necessary modifications/recommendations, and updated to include all prescription medications, OTC medications, and supplements the patient is currently taking.  This list reflects what is contained in the patient's profile, which has also been marked as reviewed to communicate to other  providers it is the most up to date version of the patient's current medication therapy.     Current Outpatient Medications:     albuterol sulfate  (90 Base) MCG/ACT inhaler, Inhale 2 puffs by mouth every 4 hours as needed., Disp: 18 g, Rfl: 3    Alcohol Swabs (Alcohol Prep) 70 % pads, Use 1 each 2 (Two) Times a Day., Disp: 60 each, Rfl: 5    aspirin (Aspirin EC Adult Low Dose) 81 MG EC tablet, Take 1 tablet by mouth Daily., Disp: 30 tablet, Rfl: 3    aspirin 325 MG tablet, Take 1 tablet by mouth Daily., Disp: 30 tablet, Rfl: 0    azelastine (ASTELIN) 0.1 % nasal spray, , Disp: , Rfl:     baclofen (LIORESAL) 20 MG tablet, Take 1 tablet by mouth 3 (Three) Times a Day., Disp: 15 tablet, Rfl: 0    buPROPion XL (Wellbutrin XL) 150 MG 24 hr tablet, Take 1 tablet by mouth Daily., Disp: 30 tablet, Rfl: 0    cloNIDine (Catapres) 0.1 MG tablet, Take 1 tablet by mouth 2 (Two) Times a Day As Needed for High Blood Pressure (if bp more than 140/90)., Disp: 60 tablet, Rfl: 1    dexlansoprazole (Dexilant) 60 MG capsule, Take 1 capsule by mouth 2 (Two) Times a Day., Disp: 60 capsule, Rfl: 5    Dilantin 100 MG capsule, Take 2 capsules by mouth 2 (Two) Times a Day., Disp: 120 capsule, Rfl: 5    diphenhydrAMINE (Benadryl Allergy) 25 MG tablet, Take 1-2 tablets by mouth Every 8 (Eight) Hours As Needed for Itching (or rash)., Disp: 120 tablet, Rfl: 2    docusate calcium (SURFAK) 240 MG capsule, Take 1 capsule by mouth 2 (Two) Times a Day As Needed for Constipation., Disp: 60 capsule, Rfl: 2    Elastic Bandages & Supports (ACE Ankle Brace) misc, 1 each Daily., Disp: 1 each, Rfl: 0    EPINEPHrine (EPIPEN) 0.3 MG/0.3ML solution auto-injector injection, Inject Intramuscularly into lateral thigh as needed for treatment of anaphylaxis, then go to the ER or call 911., Disp: 2 each, Rfl: 2    ergocalciferol (ERGOCALCIFEROL) 1.25 MG (25498 UT) capsule, Take 1 capsule by mouth 1 (One) Time Per Week., Disp: 4 capsule, Rfl: 3    Evolocumab  (REPATHA) solution auto-injector SureClick injection, Inject 1 mL under the skin into the appropriate area as directed Every 14 (Fourteen) Days., Disp: 2 mL, Rfl: 5    fluticasone (FLONASE) 50 MCG/ACT nasal spray, Use 1 spray in each nostril Twice a day for 30 days, Disp: 16 g, Rfl: 11    fluticasone (Flovent HFA) 110 MCG/ACT inhaler, Inhale 1 puff by mouth 2 (Two) Times a Day., Disp: 12 g, Rfl: 5    fluticasone (FLOVENT HFA) 44 MCG/ACT inhaler, Inhale 1 puff by mouth twice a day., Disp: 10.6 g, Rfl: 5    glucose blood (OneTouch Ultra) test strip, Use as instructed 2 times daily and as needed, Disp: 100 each, Rfl: 3    HYDROcodone-acetaminophen (Norco) 7.5-325 MG per tablet, Take 1 tablet by mouth every 6 (six) hours as needed for pain for up to 10 days. Max Daily Amount: 4 tablets, Disp: 25 tablet, Rfl: 0    ibuprofen (ADVIL,MOTRIN) 800 MG tablet, Take 1 tablet by mouth 2 (Two) Times a Day As Needed., Disp: 40 tablet, Rfl: 0    ibuprofen (ADVIL,MOTRIN) 800 MG tablet, Take 1 tablet by mouth Every 6 (Six) Hours As Needed., Disp: 40 tablet, Rfl: 1    ipratropium-albuterol (Combivent Respimat)  MCG/ACT inhaler, INHALE 1 PUFF BY MOUTH 4 (FOUR) TIMES A DAY AS NEEDED FOR WHEEZING., Disp: 4 g, Rfl: 5    ipratropium-albuterol (DUO-NEB) 0.5-2.5 mg/3 ml nebulizer, Inhale the contents of 3 mL vial nebulizer every 6 hours as needed., Disp: 360 mL, Rfl: 3    Lancets (OneTouch Delica Plus [id number]) misc, Use as instructed 2 times daily and as needed, Disp: 100 each, Rfl: 3    levocetirizine (XYZAL) 5 MG tablet, Take 1 tablet by mouth in the evening Once a day 30 days, Disp: 30 tablet, Rfl: 11    lisinopril (PRINIVIL,ZESTRIL) 30 MG tablet, Take 1 tablet by mouth Daily for blood pressure., Disp: 90 tablet, Rfl: 1    mirtazapine (REMERON) 30 MG tablet, Take 1 & 1/2  tablets by mouth Every Night., Disp: 45 tablet, Rfl: 5    montelukast (SINGULAIR) 10 MG tablet, Take 1 tablet by mouth., Disp: , Rfl:     multivitamin with  minerals tablet tablet, Take 1 tablet by mouth Daily., Disp: 30 tablet, Rfl: 12    ondansetron (Zofran) 4 MG tablet, Take 1 tablet by mouth Every 8 (Eight) Hours As Needed for Nausea., Disp: 20 tablet, Rfl: 0    rosuvastatin (Crestor) 40 MG tablet, Take 1 tablet by mouth Daily., Disp: 30 tablet, Rfl: 3    sodium chloride 0.65 % nasal spray, Use 1-3 sprays in each nostril Three times a day as needed 30 days, Disp: 44 mL, Rfl: 11    tamsulosin (FLOMAX) 0.4 MG capsule 24 hr capsule, Take 1 capsule by mouth Daily., Disp: 90 capsule, Rfl: 3    temazepam (RESTORIL) 30 MG capsule, Take 1 capsule by mouth At Night As Needed for Sleep., Disp: 30 capsule, Rfl: 0    tiZANidine (Zanaflex) 4 MG tablet, Take 1 tablet by mouth 3 (Three) Times a Day., Disp: 30 tablet, Rfl: 5    vitamin C (ASCORBIC ACID) 500 MG tablet, Take 1 tablet by mouth 2 (Two) Times a Day., Disp: 60 tablet, Rfl: 1         Drug Interactions  No significant drug-drug interactions with Repatha according to literature.     Adverse Drug Reactions        Plan for ADR Management: n/a    Adherence, Self-Administration, and Current Therapy Problems  Adherence related to the patient's specialty therapy was discussed with the patient. The Adherence segment of this outreach has been reviewed and updated.          Additional Barriers to Patient Self-Administration: patient refuses to administer himself  Methods for Supporting Patient Self-Administration: patient comes to [facility] clinic every 2 weeks for injections    Open Medication Therapy Problems  No medication therapy recommendations to display    Goals of Therapy  Goals related to the patient's specialty therapy were discussed with the patient. The Patient Goals segment of this outreach has been reviewed and updated.   Goals Addressed Today    None     LDL was 136 mg/dl on 3/20/24 and has improved to 132 mg/ml on 10/17/24. This lab work was completed prior to starting repatha therapy.    Quality of Life Assessment    Quality of Life related to the patient's enrollment in the patient management program and services provided was discussed with the patient. The QOL segment of this outreach has been reviewed and updated.       Reassessment Plan & Follow-Up  1.Medication Therapy Changes: Patient will continue Repatha 140mg every 2 weeks    A. I administered injection in to the back of his LEFT arm   2. Related Plans, Therapy Recommendations, or Issues to Be Addressed: none  3. Pharmacist to perform regular assessments no more than (6) months from the previous assessment.  Patient will continue regular follow-up with referring provider.   4. Care Coordinator to set up future refill outreaches, coordinate prescription delivery, and escalate clinical questions to pharmacist.      Attestation      I attest the patient was actively involved in and has agreed to the above plan of care.  If the prescribed therapy is at any point deemed not appropriate based on the current or future assessments, a consultation will be initiated with the patient's specialty care provider to determine the best course of action. The revised plan of therapy will be documented along with any required assessments and/or additional patient education provided.     Patient to follow up for injections in Clinic every 2 weeks    Grecia Langston. Gopal, PharmD  3/10/2025  14:50 EDT

## 2025-03-11 ENCOUNTER — HOSPITAL ENCOUNTER (OUTPATIENT)
Dept: PHYSICAL THERAPY | Facility: HOSPITAL | Age: 55
Setting detail: THERAPIES SERIES
Discharge: HOME | End: 2025-03-11
Payer: COMMERCIAL

## 2025-03-11 DIAGNOSIS — R26.9 IMPAIRED GAIT: ICD-10-CM

## 2025-03-11 DIAGNOSIS — G89.29 CHRONIC PAIN OF RIGHT ANKLE: Primary | ICD-10-CM

## 2025-03-11 DIAGNOSIS — Z98.890 HISTORY OF ANKLE SURGERY: ICD-10-CM

## 2025-03-11 DIAGNOSIS — M25.571 CHRONIC PAIN OF RIGHT ANKLE: Primary | ICD-10-CM

## 2025-03-11 PROCEDURE — 97110 THERAPEUTIC EXERCISES: CPT | Performed by: PHYSICAL THERAPIST

## 2025-03-11 PROCEDURE — 97035 APP MDLTY 1+ULTRASOUND EA 15: CPT | Performed by: PHYSICAL THERAPIST

## 2025-03-11 PROCEDURE — 97112 NEUROMUSCULAR REEDUCATION: CPT | Performed by: PHYSICAL THERAPIST

## 2025-03-11 NOTE — THERAPY EVALUATION
Physical Therapy Daily Treatment Note      Patient: Jaquan Mckay   : 1970  Referring practitioner: Khurram Florez DPM  Date of Initial Visit: Type: THERAPY  Episode: S/P Right Brostrom/achilles repair  Today's Date: 3/11/2025  Patient seen for 7 sessions       Visit Diagnoses:    ICD-10-CM ICD-9-CM   1. Chronic pain of right ankle  M25.571 719.47    G89.29 338.29   2. Impaired gait  R26.9 781.2   3. History of ankle surgery  Z98.890 V45.89       Subjective Evaluation    History of Present Illness    Subjective comment: Pt was seen by his MD and was instructed to add massage/US for edema and progress strengthening.  Pt was issued an ankle brace.  Pt has 710 pain today.       Objective   See Exercise, Manual, and Modality Logs for complete treatment.       Assessment & Plan       Assessment  Assessment details: Tx today initiated with mh to ankle; followed by exercises for improved mobility; followed by proprioceptive training and ended with US for edema and ice for decreased pain .  Pt was noted to have more difficult time with balance due to wearing brace instead of boot. Pt reported    Plan  Plan details: Will follow progressing ankle stability and decreased pain.          Timed:         Manual Therapy:         mins  14198;     Therapeutic Exercise:    32     mins  19744;     Neuromuscular Jazmin:    13    mins  26020;    Therapeutic Activity:          mins  65066;     Gait Training:           mins  82116;     Ultrasound:     8     mins  92639;    Ionto                                   mins   55080  Self Care                            mins   77528  Canalith Repos         mins 82234      Un-Timed:  Electrical Stimulation:         mins  82948 (MC );  Dry Needling          mins self-pay  Traction          mins [id number]      Timed Treatment:   53   mins   Total Treatment:     65   mins(6min mh and 6 min ice)    Win Rodriguez PT  [place] License: [id number]      Electronically signed by Win Neville  GABRIELE Rodriguez, 03/11/25, 2:07 PM EDT

## 2025-03-12 ENCOUNTER — OFFICE VISIT (OUTPATIENT)
Dept: FAMILY MEDICINE CLINIC | Facility: CLINIC | Age: 55
End: 2025-03-12
Payer: COMMERCIAL

## 2025-03-12 VITALS
DIASTOLIC BLOOD PRESSURE: 82 MMHG | HEIGHT: 67 IN | BODY MASS INDEX: 38.71 KG/M2 | SYSTOLIC BLOOD PRESSURE: 124 MMHG | WEIGHT: 246.6 LBS | HEART RATE: 86 BPM | OXYGEN SATURATION: 99 % | RESPIRATION RATE: 16 BRPM

## 2025-03-12 DIAGNOSIS — M62.838 MUSCLE SPASM: ICD-10-CM

## 2025-03-12 DIAGNOSIS — F41.9 ANXIETY: ICD-10-CM

## 2025-03-12 DIAGNOSIS — Z00.00 VISIT FOR ANNUAL HEALTH EXAMINATION: Primary | ICD-10-CM

## 2025-03-12 DIAGNOSIS — J30.1 SEASONAL ALLERGIC RHINITIS DUE TO POLLEN: ICD-10-CM

## 2025-03-12 DIAGNOSIS — F51.04 PSYCHOPHYSIOLOGICAL INSOMNIA: ICD-10-CM

## 2025-03-12 DIAGNOSIS — I10 ESSENTIAL HYPERTENSION: ICD-10-CM

## 2025-03-12 PROCEDURE — 3074F SYST BP LT 130 MM HG: CPT | Performed by: NURSE PRACTITIONER

## 2025-03-12 PROCEDURE — 1159F MED LIST DOCD IN RCRD: CPT | Performed by: NURSE PRACTITIONER

## 2025-03-12 PROCEDURE — 1160F RVW MEDS BY RX/DR IN RCRD: CPT | Performed by: NURSE PRACTITIONER

## 2025-03-12 PROCEDURE — 1125F AMNT PAIN NOTED PAIN PRSNT: CPT | Performed by: NURSE PRACTITIONER

## 2025-03-12 PROCEDURE — 99396 PREV VISIT EST AGE 40-64: CPT | Performed by: NURSE PRACTITIONER

## 2025-03-12 PROCEDURE — 3079F DIAST BP 80-89 MM HG: CPT | Performed by: NURSE PRACTITIONER

## 2025-03-12 RX ORDER — AMITRIPTYLINE HYDROCHLORIDE 10 MG/1
10 TABLET ORAL NIGHTLY
Qty: 30 TABLET | Refills: 0 | Status: SHIPPED | OUTPATIENT
Start: 2025-03-12

## 2025-03-12 RX ORDER — LORATADINE 10 MG/1
10 TABLET ORAL DAILY PRN
Qty: 30 TABLET | Refills: 5 | Status: SHIPPED | OUTPATIENT
Start: 2025-03-12

## 2025-03-12 RX ORDER — METHOCARBAMOL 750 MG/1
750 TABLET, FILM COATED ORAL 3 TIMES DAILY
Qty: 90 TABLET | Refills: 0 | Status: SHIPPED | OUTPATIENT
Start: 2025-03-12

## 2025-03-12 NOTE — PATIENT INSTRUCTIONS
It is important for your health to eat a healthy balanced diet, to exercise as tolerated, obtain dental and vision checkups routinely, work on stress reduction and be active about taking care of your mental health.  Routine age related immunizations(pneumonia, flu, shingles if applicable) are recommended.  Be active in obtaining age and gender routine maintenance exams (such as paps, breast exams, colonscopy, prostate exams, etc).    You are encouraged to have safe sex practices for STD prevention.    Be alert/educated on gun and water safety, seek help for any domestic violence concerns, and seatbelt use is strongly encouraged.       Budget-Friendly Healthy Eating  There are many ways to save money at the grocery store and continue to eat healthy. You can be successful if you:  Plan meals according to your budget.  Make a grocery list and only purchase food according to your grocery list.  Prepare food yourself at home.  What are tips for following this plan?  Reading food labels  Compare food labels between brand name foods and the store brand. Often the nutritional value is the same, but the store brand is lower cost.  Look for products that do not have added sugar, fat, or salt (sodium). These often cost the same but are healthier for you. Products may be labeled as:  Sugar-free.  Nonfat.  Low-fat.  Sodium-free.  Low-sodium.  Look for lean ground beef labeled as at least 92% lean and 8% fat.  Shopping    Buy only the items on your grocery list and go only to the areas of the store that have the items on your list.  Use coupons only for foods and brands you normally buy. Avoid buying items you wouldn't normally buy simply because they are on sale.  Check online and in newspapers for weekly deals.  Buy healthy items from the bulk bins when available, such as herbs, spices, flour, pasta, nuts, and dried fruit.  Buy fruits and vegetables that are in season. Prices are usually lower on in-season produce.  Look at the  "unit price on the price tag. Use it to compare different brands and sizes to find out which item is the best deal.  Choose healthy items that are often low-cost, such as carrots, potatoes, apples, bananas, and oranges. Dried or canned beans are a low-cost protein source.  Buy in bulk and freeze extra food. Items you can buy in bulk include meats, fish, poultry, frozen fruits, and frozen vegetables.  Avoid buying \"ready-to-eat\" foods, such as pre-cut fruits and vegetables and pre-made salads.  If possible, shop around to discover where you can find the best prices. Consider other retailers such as dollar stores, larger wholesale stores, local fruit and vegetable [employer], and [employer] markets.  Do not shop when you are hungry. If you shop while hungry, it may be hard to stick to your list and budget.  Resist impulse buying. Use your grocery list as your official plan for the week.  Buy a variety of vegetables and fruits by purchasing fresh, frozen, and canned items.  Look at the top and bottom shelves for deals. Foods at eye level (eye level of an adult or child) are usually more expensive.  Be efficient with your time when shopping. The more time you spend at the store, the more money you are likely to spend.  To save money when choosing more expensive foods like meats and dairy:  Choose cheaper cuts of meat, such as bone-in chicken thighs and drumsticks instead of skinless and boneless chicken. When you are ready to prepare the chicken, you can remove the skin yourself to make it healthier.  Choose lean meats like chicken or turkey instead of beef.  Choose canned seafood, such as tuna, salmon, or sardines.  Buy eggs as a low-cost source of protein.  Buy dried beans and peas, such as lentils, split peas, or kidney beans instead of meats. Dried beans and peas are a good alternative source of protein.  Buy the larger tubs of yogurt instead of individual-sized containers.  Choose water instead of sodas and other sweetened " "beverages.  Avoid buying chips, cookies, and other \"junk food.\" These items are usually expensive and not healthy.  Cooking  Make extra food and freeze the extras in meal-sized containers or in individual portions for fast meals and snacks.  Pre-cook on days when you have extra time to prepare meals in advance. You can keep these meals in the fridge or freezer and reheat for a quick meal.  When you come home from the grocery store, wash, peel, and cut fruits and vegetables so they are ready to use and eat. This will help reduce food waste.  Meal planning  Do not eat out or get fast food. Prepare food at home.  Make a grocery list and make sure to bring it with you to the store. If you have a smart phone, you could use your phone to create your shopping list.  Plan meals and snacks according to a grocery list and budget you create.  Use leftovers in your meal plan for the week.  Look for recipes where you can cook once and make enough food for two meals.  Prepare budget-friendly types of meals like stews, casseroles, and stir-seals dishes.  Try some meatless meals or try \"no cook\" meals like salads.  Make sure that half your plate is filled with fruits or vegetables. Choose from fresh, frozen, or canned fruits and vegetables. If eating canned, remember to rinse them before eating. This will remove any excess salt added for packaging.  Summary  Eating healthy on a budget is possible if you plan your meals according to your budget, purchase according to your budget and grocery list, and prepare food yourself.  Tips for buying more food on a limited budget include buying generic brands, using coupons only for foods you normally buy, and buying healthy items from the bulk bins when available.  Tips for buying cheaper food to replace expensive food include choosing cheaper, lean cuts of meat, and buying dried beans and peas.  This information is not intended to replace advice given to you by your health care provider. Make " "sure you discuss any questions you have with your health care provider.  Document Revised: 09/30/2021 Document Reviewed: 09/30/2021  [employer] Patient Education © 2022 [employer]        DASH Eating Plan  DASH stands for Dietary Approaches to Stop Hypertension. The DASH eating plan is a healthy eating plan that has been shown to:  Reduce high blood pressure (hypertension).  Reduce your risk for type 2 diabetes, heart disease, and stroke.  Help with weight loss.  What are tips for following this plan?  Reading food labels  Check food labels for the amount of salt (sodium) per serving. Choose foods with less than 5 percent of the Daily Value of sodium. Generally, foods with less than 300 milligrams (mg) of sodium per serving fit into this eating plan.  To find whole grains, look for the word \"whole\" as the first word in the ingredient list.  Shopping  Buy products labeled as \"low-sodium\" or \"no salt added.\"  Buy fresh foods. Avoid canned foods and pre-made or frozen meals.  Cooking  Avoid adding salt when cooking. Use salt-free seasonings or herbs instead of table salt or sea salt. Check with your health care provider or pharmacist before using salt substitutes.  Do not seals foods. Cook foods using healthy methods such as baking, boiling, grilling, roasting, and broiling instead.  Cook with heart-healthy oils, such as olive, canola, avocado, soybean, or sunflower oil.  Meal planning    Eat a balanced diet that includes:  4 or more servings of fruits and 4 or more servings of vegetables each day. Try to fill one-half of your plate with fruits and vegetables.  6-8 servings of whole grains each day.  Less than 6 oz (170 g) of lean meat, poultry, or fish each day. A 3-oz (85-g) serving of meat is about the same size as a deck of cards. One egg equals 1 oz (28 g).  2-3 servings of low-fat dairy each day. One serving is 1 cup (237 mL).  1 serving of nuts, seeds, or beans 5 times each week.  2-3 servings of heart-healthy " fats. Healthy fats called omega-3 fatty acids are found in foods such as walnuts, flaxseeds, fortified milks, and eggs. These fats are also found in cold-water fish, such as sardines, salmon, and mackerel.  Limit how much you eat of:  Canned or prepackaged foods.  Food that is high in trans fat, such as some fried foods.  Food that is high in saturated fat, such as fatty meat.  Desserts and other sweets, sugary drinks, and other foods with added sugar.  Full-fat dairy products.  Do not salt foods before eating.  Do not eat more than 4 egg yolks a week.  Try to eat at least 2 vegetarian meals a week.  Eat more home-cooked food and less restaurant, buffet, and fast food.  Lifestyle  When eating at a restaurant, ask that your food be prepared with less salt or no salt, if possible.  If you drink alcohol:  Limit how much you use to:  0-1 drink a day for women who are not pregnant.  0-2 drinks a day for men.  Be aware of how much alcohol is in your drink. In the [place] one drink equals one 12 oz bottle of beer (355 mL), one 5 oz glass of wine (148 mL), or one 1½ oz glass of hard liquor (44 mL).  General information  Avoid eating more than 2,300 mg of salt a day. If you have hypertension, you may need to reduce your sodium intake to 1,500 mg a day.  Work with your health care provider to maintain a healthy body weight or to lose weight. Ask what an ideal weight is for you.  Get at least 30 minutes of exercise that causes your heart to beat faster (aerobic exercise) most days of the week. Activities may include walking, swimming, or biking.  Work with your health care provider or dietitian to adjust your eating plan to your individual calorie needs.  What foods should I eat?  Fruits  All fresh, dried, or frozen fruit. Canned fruit in natural juice (without added sugar).  Vegetables  Fresh or frozen vegetables (raw, steamed, roasted, or grilled). Low-sodium or reduced-sodium tomato and vegetable juice. Low-sodium or  reduced-sodium tomato sauce and tomato paste. Low-sodium or reduced-sodium canned vegetables.  Grains  Whole-grain or whole-wheat bread. Whole-grain or whole-wheat pasta. Brown rice. Oatmeal. Quinoa. Bulgur. Whole-grain and low-sodium cereals. Milagros bread. Low-fat, low-sodium crackers. Whole-wheat flour tortillas.  Meats and other proteins  Skinless chicken or turkey. Ground chicken or turkey. Pork with fat trimmed off. Fish and seafood. Egg whites. Dried beans, peas, or lentils. Unsalted nuts, nut butters, and seeds. Unsalted canned beans. Lean cuts of beef with fat trimmed off. Low-sodium, lean precooked or cured meat, such as sausages or meat loaves.  Dairy  Low-fat (1%) or fat-free (skim) milk. Reduced-fat, low-fat, or fat-free cheeses. Nonfat, low-sodium ricotta or cottage cheese. Low-fat or nonfat yogurt. Low-fat, low-sodium cheese.  Fats and oils  Soft margarine without trans fats. Vegetable oil. Reduced-fat, low-fat, or light mayonnaise and salad dressings (reduced-sodium). Canola, safflower, olive, avocado, soybean, and sunflower oils. Avocado.  Seasonings and condiments  Herbs. Spices. Seasoning mixes without salt.  Other foods  Unsalted popcorn and pretzels. Fat-free sweets.  The items listed above may not be a complete list of foods and beverages you can eat. Contact a dietitian for more information.  What foods should I avoid?  Fruits  Canned fruit in a light or heavy syrup. Fried fruit. Fruit in cream or butter sauce.  Vegetables  Creamed or fried vegetables. Vegetables in a cheese sauce. Regular canned vegetables (not low-sodium or reduced-sodium). Regular canned tomato sauce and paste (not low-sodium or reduced-sodium). Regular tomato and vegetable juice (not low-sodium or reduced-sodium). Pickles. Olives.  Grains  Baked goods made with fat, such as croissants, muffins, or some breads. Dry pasta or rice meal packs.  Meats and other proteins  Fatty cuts of meat. Ribs. Fried meat. Robb. [place],  salami, and other precooked or cured meats, such as sausages or meat loaves. Fat from the back of a pig (fatback). Bratwurst. Salted nuts and seeds. Canned beans with added salt. Canned or smoked fish. Whole eggs or egg yolks. Chicken or turkey with skin.  Dairy  Whole or 2% milk, cream, and half-and-half. Whole or full-fat cream cheese. Whole-fat or sweetened yogurt. Full-fat cheese. Nondairy creamers. Whipped toppings. Processed cheese and cheese spreads.  Fats and oils  Butter. Stick margarine. Lard. Shortening. Ghee. Zamudio fat. Tropical oils, such as coconut, palm kernel, or palm oil.  Seasonings and condiments  Onion salt, garlic salt, seasoned salt, table salt, and sea salt. [facility] sauce. Tartar sauce. Barbecue sauce. Teriyaki sauce. Soy sauce, including reduced-sodium. Steak sauce. Canned and packaged gravies. Fish sauce. Oyster sauce. Cocktail sauce. Store-bought horseradish. Ketchup. Mustard. Meat flavorings and tenderizers. Bouillon cubes. Hot sauces. Pre-made or packaged marinades. Pre-made or packaged taco seasonings. Relishes. Regular salad dressings.  Other foods  Salted popcorn and pretzels.  The items listed above may not be a complete list of foods and beverages you should avoid. Contact a dietitian for more information.  Where to find more information  National Heart, Lung, and Blood [place]: www.nhlbi.nih.gov  American Heart Association: www.heart.org  Academy of Nutrition and Dietetics: www.eatright.org  National Kidney Foundation: www.kidney.org  Summary  The DASH eating plan is a healthy eating plan that has been shown to reduce high blood pressure (hypertension). It may also reduce your risk for type 2 diabetes, heart disease, and stroke.  When on the DASH eating plan, aim to eat more fresh fruits and vegetables, whole grains, lean proteins, low-fat dairy, and heart-healthy fats.  With the DASH eating plan, you should limit salt (sodium) intake to 2,300 mg a day. If you have  hypertension, you may need to reduce your sodium intake to 1,500 mg a day.  Work with your health care provider or dietitian to adjust your eating plan to your individual calorie needs.  This information is not intended to replace advice given to you by your health care provider. Make sure you discuss any questions you have with your health care provider.  Document Revised: 11/20/2020 Document Reviewed: 11/20/2020     Fall Prevention in the Home, Adult  Falls can cause injuries and affect people of all ages. There are many simple things that you can do to make your home safe and to help prevent falls. Ask for help when making these changes, if needed.  What actions can I take to prevent falls?  General instructions  Use good lighting in all rooms. Replace any light bulbs that burn out, turn on lights if it is dark, and use night-lights.  Place frequently used items in easy-to-reach places. Lower the shelves around your home if necessary.  Set up furniture so that there are clear paths around it. Avoid moving your furniture around.  Remove throw rugs and other tripping hazards from the floor.  Avoid walking on wet floors.  Fix any uneven floor surfaces.  Add color or contrast paint or tape to grab bars and handrails in your home. Place contrasting color strips on the first and last steps of staircases.  When you use a stepladder, make sure that it is completely opened and that the sides and supports are firmly locked. Have someone hold the ladder while you are using it. Do not climb a closed stepladder.  Know where your pets are when moving through your home.  What can I do in the bathroom?     Keep the floor dry. Immediately clean up any water that is on the floor.  Remove soap buildup in the tub or shower regularly.  Use nonskid mats or decals on the floor of the tub or shower.  Attach bath mats securely with double-sided, nonslip rug tape.  If you need to sit down while you are in the shower, use a plastic,  nonslip stool.  Install grab bars by the toilet and in the tub and shower. Do not use towel bars as grab bars.  What can I do in the bedroom?  Make sure that a bedside light is easy to reach.  Do not use oversized bedding that reaches the floor.  Have a firm chair that has side arms to use for getting dressed.  What can I do in the kitchen?  Clean up any spills right away.  If you need to reach for something above you, use a sturdy step stool that has a grab bar.  Keep electrical cables out of the way.  Do not use floor polish or wax that makes floors slippery. If you must use wax, make sure that it is non-skid floor wax.  What can I do with my stairs?  Do not leave any items on the stairs.  Make sure that you have a light switch at the top and the bottom of the stairs. Have them installed if you do not have them.  Make sure that there are handrails on both sides of the stairs. Fix handrails that are broken or loose. Make sure that handrails are as long as the staircases.  Install non-slip stair treads on all stairs in your home.  Avoid having throw rugs at the top or bottom of stairs, or secure the rugs with carpet tape to prevent them from moving.  Choose a carpet design that does not hide the edge of steps on the stairs.  Check any carpeting to make sure that it is firmly attached to the stairs. Fix any carpet that is loose or worn.  What can I do on the outside of my home?  Use bright outdoor lighting.  Regularly repair the edges of walkways and driveways and fix any cracks.  Remove high doorway thresholds.  Trim any shrubbery on the main path into your home.  Regularly check that handrails are securely fastened and in good repair. Both sides of all steps should have handrails.  Install guardrails along the edges of any raised decks or porches.  Clear walkways of debris and clutter, including tools and rocks.  Have leaves, snow, and ice cleared regularly.  Use sand or salt on walkways during winter months.  In  the garage, clean up any spills right away, including grease or oil spills.  What other actions can I take?  Wear closed-toe shoes that fit well and support your feet. Wear shoes that have rubber soles or low heels.  Use mobility aids as needed, such as canes, walkers, scooters, and crutches.  Review your medicines with your health care provider. Some medicines can cause dizziness or changes in blood pressure, which increase your risk of falling.  Talk with your health care provider about other ways that you can decrease your risk of falls. This may include working with a physical therapist or  to improve your strength, balance, and endurance.  Where to find more information  Centers for Disease Control and Prevention, [place]: www.cdc.gov  National [place] on Aging: www.melinda.nih.gov  Contact a health care provider if:  You are afraid of falling at home.  You feel weak, drowsy, or dizzy at home.  You fall at home.  Summary  There are many simple things that you can do to make your home safe and to help prevent falls.  Ways to make your home safe include removing tripping hazards and installing grab bars in the bathroom.  Ask for help when making these changes in your home.  This information is not intended to replace advice given to you by your health care provider. Make sure you discuss any questions you have with your health care provider.  Document Revised: 09/19/2022 Document Reviewed: 07/21/2021  [employer] Patient Education © 2022 [employer]     Health Risks of Smoking  Smoking tobacco is very bad for your health. Tobacco smoke contains many toxic chemicals that can damage every part of your body. Secondhand smoke can be harmful to those around you. Tobacco or nicotine use can cause many long-term (chronic) diseases.  Smoking is difficult to quit because a chemical in tobacco, called nicotine, causes addiction or dependence. When you smoke and inhale, nicotine is absorbed quickly into the bloodstream  through your lungs. Both inhaled and non-inhaled nicotine may be addictive.  How can quitting affect me?  There are health benefits of quitting smoking. Some benefits happen right away and others take time. Benefits may include:  Blood flow, blood pressure, heart rate, and lung capacity may begin to improve. However, any lung damage that has already occurred cannot be repaired.  Temporary respiratory symptoms, such as nasal congestion and cough, may improve over time.  Your risk of heart disease, stroke, and cancer is reduced.  The overall quality of your health may improve.  You may save money, as you will not spend money on tobacco products and may spend less money on smoking-related health issues.  What can increase my risk?  Smoking harms nearly every organ in the body. People who smoke tobacco have a shorter life expectancy and an increased risk of many serious medical problems. These include:  More respiratory infections, such as colds and pneumonia.  Cancer.  Heart disease.  Stroke.  Chronic respiratory diseases.  Delayed wound healing and increased risk of complications during surgery.  Problems with reproduction, pregnancy, and childbirth, such as infertility, early (premature) births, stillbirths, and birth defects.  Secondhand smoke exposure to children increases the risk of:  Sudden infant death syndrome (SIDS).  Infections in the nose, throat, or airways (respiratory infections).  Chronic respiratory symptoms.  What actions can I take to quit?  Smoking is an addiction that affects both your body and your mind, and long-time habits can be hard to change. Your health care provider can recommend:  Nicotine replacement products, such as patches, gum, and nasal sprays. Use these products only as directed. Do not replace cigarette smoking with electronic cigarettes, which are commonly called e-cigarettes. The safety of e-cigarettes is not known, and some may contain harmful chemicals.  Programs and community  resources, which may include group support, education, or talk therapy.  Prescription medicines to help reduce cravings.  A combination of two or more quit methods, which will increase the success of quitting.  Where to find support  Follow the recommendations from your health care provider about support groups and other assistance. You can also visit:  [place] American Quitline Consortium: www.naquitline.org or call 0-886-QUIT-NOW.  [place] Department of Health and Human Services: www.smokefree.gov  American Lung Association: www.freedomfromsmoking.org  American Heart Association: www.heart.org  Where to find more information  Centers for Disease Control and Prevention: www.cdc.gov  World Health Organization: www.who.int  Summary  Smoking tobacco is very bad for your health. Tobacco smoke contains many toxic chemicals that can damage every part of the body.  Smoking is difficult to quit because a chemical in tobacco, called nicotine, causes addiction or dependence.  There are immediate and long-term health benefits of quitting smoking.  A combination of two or more quit methods increases the success of quitting.  This information is not intended to replace advice given to you by your health care provider. Make sure you discuss any questions you have with your health care provider.  Document Revised: 08/22/2022 Document Reviewed: 02/01/2021  [employer] Patient Education © 2022 [employer]     Steps to Quit Smoking  Smoking tobacco is the leading cause of preventable death. It can affect almost every organ in the body. Smoking puts you and those around you at risk for developing many serious chronic diseases. Quitting smoking can be difficult, but it is one of the best things that you can do for your health. It is never too late to quit.  How do I get ready to quit?  When you decide to quit smoking, create a plan to help you succeed. Before you quit:  Pick a date to quit. Set a date within the next 2 weeks to give you  time to prepare.  Write down the reasons why you are quitting. Keep this list in places where you will see it often.  Tell your family, friends, and co-workers that you are quitting. Support from your loved ones can make quitting easier.  Talk with your health care provider about your options for quitting smoking.  Find out what treatment options are covered by your health insurance.  Identify people, places, things, and activities that make you want to smoke (triggers). Avoid them.  What first steps can I take to quit smoking?  Throw away all cigarettes at home, at work, and in your car.  Throw away smoking accessories, such as ashtrays and lighters.  Clean your car. Make sure to empty the ashtray.  Clean your home, including curtains and carpets.  What strategies can I use to quit smoking?  Talk with your health care provider about combining strategies, such as taking medicines while you are also receiving in-person counseling. Using these two strategies together makes you more likely to succeed in quitting than if you used either strategy on its own.  If you are pregnant or breastfeeding, talk with your health care provider about finding counseling or other support strategies to quit smoking. Do not take medicine to help you quit smoking unless your health care provider tells you to do so.  To quit smoking:  Quit right away  Quit smoking completely, instead of gradually reducing how much you smoke over a period of time. Research shows that stopping smoking right away is more successful than gradually quitting.  Attend in-person counseling to help you build problem-solving skills. You are more likely to succeed in quitting if you attend counseling sessions regularly. Even short sessions of 10 minutes can be effective.  Take medicine  You may take medicines to help you quit smoking. Some medicines require a prescription and some you can purchase over-the-counter. Medicines may have nicotine in them to replace the  nicotine in cigarettes. Medicines may:  Help to stop cravings.  Help to relieve withdrawal symptoms.  Your health care provider may recommend:  Nicotine patches, gum, or lozenges.  Nicotine inhalers or sprays.  Non-nicotine medicine that is taken by mouth.  Find resources  Find resources and support systems that can help you to quit smoking and remain smoke-free after you quit. These resources are most helpful when you use them often. They include:  Online chats with a counselor.  Telephone quitlines.  Printed self-help materials.  Support groups or group counseling.  Text messaging programs.  Mobile phone apps or applications. Use apps that can help you stick to your quit plan by providing reminders, tips, and encouragement. There are many free apps for mobile devices as well as websites. Examples include Quit Guide from the CDC and smokefree.gov  What things can I do to make it easier to quit?    Reach out to your family and friends for support and encouragement. Call telephone quitlines (4-240-QUIT-NOW), reach out to support groups, or work with a counselor for support.  Ask people who smoke to avoid smoking around you.  Avoid places that trigger you to smoke, such as bars, parties, or smoke-break areas at work.  Spend time with people who do not smoke.  Lessen the stress in your life. Stress can be a smoking trigger for some people. To lessen stress, try:  Exercising regularly.  Doing deep-breathing exercises.  Doing yoga.  Meditating.  Performing a body scan. This involves closing your eyes, scanning your body from head to toe, and noticing which parts of your body are particularly tense. Try to relax the muscles in those areas.  How will I feel when I quit smoking?  Day 1 to 3 weeks  Within the first 24 hours of quitting smoking, you may start to feel withdrawal symptoms. These symptoms are usually most noticeable 2-3 days after quitting, but they usually do not last for more than 2-3 weeks. You may experience  these symptoms:  Mood swings.  Restlessness, anxiety, or irritability.  Trouble concentrating.  Dizziness.  Strong cravings for sugary foods and nicotine.  Mild weight gain.  Constipation.  Nausea.  Coughing or a sore throat.  Changes in how the medicines that you take for unrelated issues work in your body.  Depression.  Trouble sleeping (insomnia).  Week 3 and afterward  After the first 2-3 weeks of quitting, you may start to notice more positive results, such as:  Improved sense of smell and taste.  Decreased coughing and sore throat.  Slower heart rate.  Lower blood pressure.  Clearer skin.  The ability to breathe more easily.  Fewer sick days.  Quitting smoking can be very challenging. Do not get discouraged if you are not successful the first time. Some people need to make many attempts to quit before they achieve long-term success. Do your best to stick to your quit plan, and talk with your health care provider if you have any questions or concerns.  Summary  Smoking tobacco is the leading cause of preventable death. Quitting smoking is one of the best things that you can do for your health.  When you decide to quit smoking, create a plan to help you succeed.  Quit smoking right away, not slowly over a period of time.  When you start quitting, seek help from your health care provider, family, or friends.  This information is not intended to replace advice given to you by your health care provider. Make sure you discuss any questions you have with your health care provider.  Document Revised: 08/26/2022 Document Reviewed: 03/07/2020  Elsevier Patient Education © 2022 [employer]     Stress, Adult  Stress is a normal reaction to life events. Stress is what you feel when life demands more than you are used to, or more than you think you can handle.  Some stress can be useful, such as studying for a test or meeting a deadline at work. Stress that occurs too often or for too long can cause problems. Long-lasting  stress is called chronic stress. Chronic stress can affect your emotional health and interfere with relationships and normal daily activities.  Too much stress can weaken your body's defense system (immune system) and increase your risk for physical illness. If you already have a medical problem, stress can make it worse.  What are the causes?  All sorts of life events can cause stress. An event that causes stress for one person may not be stressful for someone else. Major life events, whether positive or negative, commonly cause stress. Examples include:  Losing a job or starting a new job.  Losing a loved one.  Moving to a new town or home.  Getting  or .  Having a baby.  Getting injured or sick.  Less obvious life events can also cause stress, especially if they occur day after day or in combination with each other. Examples include:  Working long hours.  Driving in traffic.  Caring for children.  Being in debt.  Being in a difficult relationship.  What are the signs or symptoms?  Stress can cause emotional and physical symptoms and can lead to unhealthy behaviors. These include the following:  Emotional symptoms  Anxiety. This is feeling worried, afraid, on edge, overwhelmed, or out of control.  Anger, including irritation or impatience.  Depression. This is feeling sad, down, helpless, or guilty.  Trouble focusing, remembering, or making decisions.  Physical symptoms  Aches and pains. These may affect your head, neck, back, stomach, or other areas of your body.  Tight muscles or a clenched jaw.  Low energy.  Trouble sleeping.  Unhealthy behaviors  Eating to feel better (overeating) or skipping meals.  Working too much or putting off tasks.  Smoking, drinking alcohol, or using drugs to feel better.  How is this diagnosed?  A stress disorder is diagnosed through an assessment by your health care provider. A stress disorder may be diagnosed based on:  Your symptoms and any stressful life  events.  Your medical history.  Tests to rule out other causes of your symptoms.  Depending on your condition, your health care provider may refer you to a specialist for further evaluation.  How is this treated?  Stress management techniques are the recommended treatment for stress. Medicine is not typically recommended for treating stress.  Techniques to reduce your reaction to stressful life events include:  Identifying stress. Monitor yourself for symptoms of stress and notice what causes stress for you. These skills may help you to avoid or prepare for stressful events.  Managing time. Set your priorities, keep a calendar of events, and learn to say no. These actions can help you avoid taking on too much.  Techniques for dealing with stress include:  Rethinking the problem. Try to think realistically about stressful events rather than ignoring them or overreacting. Try to find the positives in a stressful situation rather than focusing on the negatives.  Exercise. Physical exercise can release both physical and emotional tension. The key is to find a form of exercise that you enjoy and do it regularly.  Relaxation techniques. These relax the body and mind. Find one or more that you enjoy and use the techniques regularly. Examples include:  Meditation, deep breathing, or progressive relaxation techniques.  Yoga or tavo chi.  Biofeedback, mindfulness techniques, or journaling.  Listening to music, being in nature, or taking part in other hobbies.  Practicing a healthy lifestyle. Eat a balanced diet, drink plenty of water, limit or avoid caffeine, and get plenty of sleep.  Having a strong support network. Spend time with family, friends, or other people you enjoy being around. Express your feelings and talk things over with someone you trust.  Counseling or talk therapy with a mental health provider may help if you are having trouble managing stress by yourself.  Follow these instructions at  home:  Lifestyle    Avoid drugs.  Do not use any products that contain nicotine or tobacco. These products include cigarettes, chewing tobacco, and vaping devices, such as e-cigarettes. If you need help quitting, ask your health care provider.  If you drink alcohol:  Limit how much you have to:  0-1 drink a day for women who are not pregnant.  0-2 drinks a day for men.  Know how much alcohol is in a drink. In the [place] one drink equals one 12 oz bottle of beer (355 mL), one 5 oz glass of wine (148 mL), or one 1½ oz glass of hard liquor (44 mL).  Do not use alcohol or drugs to relax.  Eat a balanced diet that includes fresh fruits and vegetables, whole grains, lean meats, fish, eggs, beans, and low-fat dairy. Avoid processed foods and foods high in added fat, sugar, and salt.  Exercise at least 30 minutes on 5 or more days each week.  Get 7-8 hours of sleep each night.  General instructions    Practice stress management techniques as told by your health care provider.  Drink enough fluid to keep your urine pale yellow.  Take over-the-counter and prescription medicines only as told by your health care provider.  Keep all follow-up visits. This is important.  Contact a health care provider if:  Your symptoms get worse.  You have new symptoms.  You feel overwhelmed by your problems and can no longer manage them by yourself.  Get help right away if:  You have thoughts of hurting yourself or others.  Get help right awayif you feel like you may hurt yourself or others, or have thoughts about taking your own life. Go to your nearest emergency room or:  Call 421.  Call the National Suicide Prevention Lifeline at 1-488.171.8233 or 627. This is open 24 hours a day.  Text the Crisis Text Line at 475368.  Summary  Stress is a normal reaction to life events. It can cause problems if it happens too often or for too long.  Practicing stress management techniques is the best way to treat stress.  Counseling or talk therapy with a  mental health provider may help if you are having trouble managing stress by yourself.  This information is not intended to replace advice given to you by your health care provider. Make sure you discuss any questions you have with your health care provider.  Document Revised: 07/28/2022 Document Reviewed: 07/28/2022  [employer]vier Patient Education © 2022 [employer]

## 2025-03-12 NOTE — PROGRESS NOTES
"Subjective   Jaquan Mckay is a 54 y.o. male.     Chief Complaint   Patient presents with    Ankle Pain       History of Present Illness     Physical-due for updated physical    Ankle problem-right ankle.  \"Is staying swelled\".  He is possibly having some swelling.  He is in a brace/air cast for support.  His sutures are removed.  He did follow up yesterday.  He reports that his toes are swollen at times.  He will follow up in approx 6 weeks and will have additional testing if he has swelling.  He has been given Norco again as his PT exercises has increased.  He has had recurrent falls. He has not been able to use his cane as the rubber base is broken and he has the Hurri-cane and it requires a specialized replacement part for his base.  Depression and anxiety-ongoing. On Wellbutrin 150 mg and Remeron.  He is on restoril 30 mg for sleep.  He reports that he did not tolerate the wellbutrin after his dose increase. He reports that he broke out in a rash and had sores \"everywhere\".  He reports that his car is broke down and he is trying to get transportation.    HTN-stable on lisinopril.  No CP.  No palpitations.  No SOA.   He is under the care of cardiology  Back and leg pain-he reports that he has had some increased foot numbness.  He reports burning and stinging.  He has Neuropathy from a past diagnosis.  He reports that he was ordered Gabapentin but got a rash and could not take it.  He would like to stop the zanaflex and try robaxin again.      The following portions of the patient's history were reviewed and updated as appropriate: CC, ROS, allergies, current medications, past family history, past medical history, past social history, past surgical history and problem list.      Review of Systems   Constitutional:  Positive for fatigue. Negative for appetite change, unexpected weight gain and unexpected weight loss.   HENT:  Negative for congestion, ear pain, postnasal drip, rhinorrhea, sore throat, swollen " "glands, trouble swallowing and voice change.    Eyes:  Negative for pain and visual disturbance.   Respiratory:  Negative for cough, chest tightness, shortness of breath and wheezing.    Cardiovascular:  Negative for chest pain, palpitations and leg swelling.   Gastrointestinal:  Negative for abdominal pain, blood in stool, constipation, diarrhea, nausea and indigestion.   Genitourinary:  Negative for dysuria, hematuria and urgency.   Musculoskeletal:  Positive for arthralgias, back pain, gait problem and neck stiffness. Negative for joint swelling and myalgias.   Skin:  Negative for color change and skin lesions.   Allergic/Immunologic: Negative.    Neurological:  Negative for dizziness, numbness and headache.   Hematological: Negative.    Psychiatric/Behavioral:  Positive for agitation (mildly about his stressors), dysphoric mood, sleep disturbance and stress. Negative for suicidal ideas. The patient is nervous/anxious.    All other systems reviewed and are negative.      Objective     /82   Pulse 86   Resp 16   Ht 170.2 cm (67\")   Wt 112 kg (246 lb 9.6 oz)   SpO2 99%   BMI 38.62 kg/m²     Physical Exam  Vitals reviewed.   Constitutional:       General: He is not in acute distress.     Appearance: He is well-developed. He is obese. He is not diaphoretic.   HENT:      Head: Normocephalic and atraumatic.      Jaw: No tenderness.      Right Ear: Hearing, tympanic membrane, ear canal and external ear normal.      Left Ear: Hearing, tympanic membrane, ear canal and external ear normal.      Nose: Nose normal. No nasal tenderness or congestion.      Right Sinus: No maxillary sinus tenderness or frontal sinus tenderness.      Left Sinus: No maxillary sinus tenderness or frontal sinus tenderness.      Mouth/Throat:      Lips: Pink.      Mouth: Mucous membranes are moist.      Pharynx: Oropharynx is clear. Uvula midline.   Eyes:      General: Lids are normal. No scleral icterus.     Extraocular Movements:      " Right eye: Normal extraocular motion and no nystagmus.      Left eye: Normal extraocular motion and no nystagmus.      Conjunctiva/sclera: Conjunctivae normal.      Pupils: Pupils are equal, round, and reactive to light.   Neck:      Thyroid: No thyromegaly or thyroid tenderness.      Vascular: No carotid bruit or JVD.      Trachea: No tracheal tenderness.   Cardiovascular:      Rate and Rhythm: Normal rate and regular rhythm.      Pulses:           Dorsalis pedis pulses are 2+ on the right side and 2+ on the left side.        Posterior tibial pulses are 2+ on the right side and 2+ on the left side.      Heart sounds: Normal heart sounds, S1 normal and S2 normal. No murmur heard.  Pulmonary:      Effort: Pulmonary effort is normal. No accessory muscle usage, prolonged expiration or respiratory distress.      Breath sounds: Normal breath sounds.   Chest:      Chest wall: No tenderness.   Abdominal:      General: Bowel sounds are normal. There is no distension.      Palpations: Abdomen is soft. There is no hepatomegaly, splenomegaly or mass.      Tenderness: There is no abdominal tenderness.   Musculoskeletal:         General: Tenderness present.      Cervical back: Normal range of motion and neck supple.      Lumbar back: Tenderness present.      Right lower leg: No edema.      Left lower leg: No edema.      Right ankle: Tenderness present. Decreased range of motion.      Left ankle: Tenderness present. Decreased range of motion.      Right foot: Decreased range of motion. Normal capillary refill. Swelling and tenderness present.      Left foot: Decreased range of motion. Normal capillary refill. Tenderness present.      Comments: No muscular atrophy or flaccidity.  Left foot with brace for support  Right foot in aircast.  Mild edema   Lymphadenopathy:      Head:      Right side of head: No submental or submandibular adenopathy.      Left side of head: No submental or submandibular adenopathy.      Cervical: No  cervical adenopathy.      Right cervical: No superficial cervical adenopathy.     Left cervical: No superficial cervical adenopathy.   Skin:     General: Skin is warm and dry.      Capillary Refill: Capillary refill takes less than 2 seconds.      Coloration: Skin is not jaundiced or pale.      Findings: No erythema.      Nails: There is no clubbing.   Neurological:      Mental Status: He is alert and oriented to person, place, and time.      Cranial Nerves: No cranial nerve deficit or facial asymmetry.      Sensory: No sensory deficit.      Motor: No weakness, tremor, atrophy or abnormal muscle tone.      Coordination: Coordination normal.      Gait: Gait abnormal (moderate antalgia).      Deep Tendon Reflexes: Reflexes are normal and symmetric.   Psychiatric:         Attention and Perception: He is attentive.         Mood and Affect: Mood normal. Mood is not anxious or depressed.         Speech: Speech normal.         Behavior: Behavior normal. Behavior is cooperative.         Thought Content: Thought content normal.         Cognition and Memory: Cognition normal.         Judgment: Judgment normal.         Assessment & Plan     No results found.        3/12/2025    12:00 PM   Functional & Cognitive Status   Do you have difficulty preparing food and eating? No   Do you have difficulty bathing yourself, getting dressed or grooming yourself? No   Do you have difficulty using the toilet? No   Do you have difficulty moving around from place to place? No   Do you have trouble with steps or getting out of a bed or a chair? No   Current Diet Well Balanced Diet   Dental Exam Other   Eye Exam Up to date   Exercise (times per week) 2 times per week   Current Exercises Include Light Weights   Do you need help using the phone?  No   Are you deaf or do you have serious difficulty hearing?  No   Do you need help to go to places out of walking distance? Yes   Do you need help shopping? No   Do you need help preparing meals?  No    Do you need help with housework?  No   Do you need help with laundry? No   Do you need help taking your medications? No   Do you need help managing money? No   Do you ever drive or ride in a car without wearing a seat belt? No   Have you felt unusual stress, anger or loneliness in the last month? Yes   Who do you live with? Alone   If you need help, do you have trouble finding someone available to you? No   Have you been bothered in the last four weeks by sexual problems? No   Do you have difficulty concentrating, remembering or making decisions? No       PHQ-2 Depression Screening  Little interest or pleasure in doing things? Not at all   Feeling down, depressed, or hopeless? Several days   PHQ-2 Total Score 1         Diagnoses and all orders for this visit:    1. Visit for annual health examination (Primary)    2. Essential hypertension  Assessment & Plan:  Continue lisinopril 30 mg.  Continue under the care of cardiology.  Ambulatory BP monitoring either at home or random community checks.  Patient to report continued elevations >140/90.  Patient may come by office for checks if needed.         3. Muscle spasm  Comments:  trial of Robaxin  Orders:  -     methocarbamol (ROBAXIN) 750 MG tablet; Take 1 tablet by mouth 3 (Three) Times a Day.  Dispense: 90 tablet; Refill: 0    4. Seasonal allergic rhinitis due to pollen  Comments:  will add claritin back.  Continue montelukast  continue immunotherapy.   continue under care of allergy specialist.  Orders:  -     loratadine (CLARITIN) 10 MG tablet; Take 1 tablet by mouth Daily As Needed for Allergies.  Dispense: 30 tablet; Refill: 5    5. Anxiety  Assessment & Plan:  Continue Remeron.    Continue restoril.    Trial of Elavil.  Continue under care of comp care    Orders:  -     amitriptyline (ELAVIL) 10 MG tablet; Take 1 tablet by mouth Every Night.  Dispense: 30 tablet; Refill: 0    6. Psychophysiological insomnia  Overview:  With depression and  anxiety      Assessment & Plan:  Continue Remeron.  Continue restoril   Continue to work on sleep Hygiene.    Orders:  -     amitriptyline (ELAVIL) 10 MG tablet; Take 1 tablet by mouth Every Night.  Dispense: 30 tablet; Refill: 0        Jaquan Mckay  reports that he has been smoking cigars and cigarettes. He started smoking about 2 years ago. He has a 18 pack-year smoking history. He has been exposed to tobacco smoke. He has never used smokeless tobacco. I have educated him on the risk of diseases from using tobacco products such as cancer, COPD, and heart disease.   I advised him to quit and he is not willing to quit.  I spent 3  minutes counseling the patient.    Understands disease processes and need for medications.  Understands reasons for urgent and emergent care.  Patient (& family) verbalized agreement for treatment plan.   Emotional support and active listening provided.  Patient provided time to verbalize feelings.  Counseling provided today including importance of good nutrition, exercise as tolerated, dental health, stress reduction and mental health. Importance of immunizations discussed.   Appropriate screenings based on gender (paps, breast exam, mammogram, PSA, colon screens, etc).     Counseled on safe sex practices and STD prevention.   Counseling regarding gun and water safety, domestic violence, and seatbelt use.        CHAVEZ/PMDP reviewed today and consistent.  Will refill prescribed controlled medication today.  Patient is aware they cannot receive narcotics from any other provider except if under care of pain management or speciality clinic.  Risk and benefits of medication use has been reviewed.  History and physical exam exhibit continued safe and appropriate use of controlled substances.  The patient is aware of the potential for addiction and dependence.  This patient has been made aware of the appropriate use of such medications, including potential risk of somnolence, limited ability  to drive and / or work safely, and potential for overdose.    It has also been made clear that these medications are for use by this patient only, without concomitant use of alcohol or other substances unless prescribed/advised by medical provider.  Patient understands they may be subject to UDS and pill counts at random.    Patient considered to be moderate risk for addiction due to use of multiple controlled medications.  Patient understands and accepts these risks.  Patient need for medication will be reassessed at each visit.  Doses will be adjusted according to patient need and findings.    Goal of TX: Patient will not have any adverse reactions of medication.      [facility] Patient Controlled Substance Report (from 3/12/2024 to 3/12/2025)    Dispensed  Strength Quantity Days Supply Provider Pharmacy   02/03/2025 Temazepam 30MG 30 each 30 [facility]...   01/23/2025 Hydrocodone/Acetaminophen 325MG/7.5MG 25 each 7 [facility]...   01/23/2025 Temazepam 15MG 30 each 30 [facility]...   12/27/2024 Hydrocodone Bitartrate/Ac 325MG/10MG 35 each 9 [facility]...   12/17/2024 Temazepam 15MG 30 each 30 [facility]...   12/06/2024 Oxycodone/Acetaminophen 325MG/5MG 30 each 8 [facility]...        RTC 1 month, sooner if needed.             This document has been electronically signed by:  BALDOMERO Thao FNP-C Dragon disclaimer:  Part of this note may be an electronic transcription/translation of spoken language to printed text using the Dragon Dictation System.

## 2025-03-13 ENCOUNTER — HOSPITAL ENCOUNTER (OUTPATIENT)
Dept: PHYSICAL THERAPY | Facility: HOSPITAL | Age: 55
Setting detail: THERAPIES SERIES
Discharge: HOME | End: 2025-03-13
Payer: COMMERCIAL

## 2025-03-13 DIAGNOSIS — G89.29 CHRONIC PAIN OF RIGHT ANKLE: Primary | ICD-10-CM

## 2025-03-13 DIAGNOSIS — Z98.890 HISTORY OF ANKLE SURGERY: ICD-10-CM

## 2025-03-13 DIAGNOSIS — R26.9 IMPAIRED GAIT: ICD-10-CM

## 2025-03-13 DIAGNOSIS — M25.571 CHRONIC PAIN OF RIGHT ANKLE: Primary | ICD-10-CM

## 2025-03-13 PROCEDURE — 97110 THERAPEUTIC EXERCISES: CPT | Performed by: PHYSICAL THERAPIST

## 2025-03-13 PROCEDURE — 97033 APP MDLTY 1+IONTPHRSIS EA 15: CPT | Performed by: PHYSICAL THERAPIST

## 2025-03-13 PROCEDURE — 97530 THERAPEUTIC ACTIVITIES: CPT | Performed by: PHYSICAL THERAPIST

## 2025-03-13 NOTE — THERAPY EVALUATION
Physical Therapy Re Certification Of Plan of Care  Patient: Jaquan Mckay   : 1970  Diagnosis/ICD-10 Code:    Referring practitioner: Khurram Florez DPM  Date of Initial Visit: Type: THERAPY  Episode: S/P Right Brostrom/achilles repair  Today's Date: 3/13/2025  Patient seen for 8 sessions         Visit Diagnoses:    ICD-10-CM ICD-9-CM   1. Chronic pain of right ankle  M25.571 719.47    G89.29 338.29   2. Impaired gait  R26.9 781.2   3. History of ankle surgery  Z98.890 V45.89         Jaquan Mckay reports:   Subjective Questionnaire: LEFS: 83%  Clinical Progress: improved  Home Program Compliance: Yes  Treatment has included: therapeutic exercise, neuromuscular re-education, manual therapy, therapeutic activity, gait training, electrical stimulation, ultrasound, iontophoresis, moist heat, and cryotherapy      Subjective Evaluation    History of Present Illness    Subjective comment: Pt reports he received medication for ionto for his ankle.Pain  Current pain ratin  At best pain ratin  At worst pain ratin             Objective          Active Range of Motion     Right Ankle/Foot   Plantar flexion: 55 degrees     Additional Active Range of Motion Details  Right ankle lacks 3 degrees from neutral    Strength/Myotome Testing     Right Ankle/Foot   Dorsiflexion: 4-  Plantar flexion: 4-    Ambulation     Comments   Pt amb with ankle brace with increased velocity with no AD and mild decreased stance on right           Assessment & Plan       Assessment  Impairments: abnormal coordination, abnormal gait, abnormal muscle firing, abnormal muscle tone, abnormal or restricted ROM, activity intolerance, impaired balance, impaired physical strength, lacks appropriate home exercise program, pain with function and weight-bearing intolerance   Functional limitations: lifting, walking, uncomfortable because of pain, standing and unable to perform repetitive tasks   Assessment details: Pt is a 55 y/o male  referred to therapy for treatment following a right Brostrom repair and achilles repair.  Pt has attended 8 sessions with improved gait, leg stability and ROM.  Therapy will cont to follow for max gains for improved function.  Prognosis: good    Goals  Plan Goals: STG 6 weeks    1 Pt will be instructed in a HEP.progressing  2 Pt will improve his LEFs to less than 50%not met  3 Pt will demonstrate 5 degrees from neutral DF.progressing    LTG 12 weeks    1 Pt will demonstrate 4/5 gross right ankle strength.not met  2 Pt will improve his LEFs to less than 25%.not met  3 Pt will amb with improved stance on right LE with no AD.met  4 Pt will improve his right ankle ROM from 0 DF to 65 degree PF.not met    Plan  Therapy options: will be seen for skilled therapy services  Planned modality interventions: cryotherapy, TENS, thermotherapy (hydrocollator packs) and ultrasound  Planned therapy interventions: ADL retraining, balance/weight-bearing training, body mechanics training, flexibility, functional ROM exercises, gait training, home exercise program, IADL retraining, joint mobilization, manual therapy, motor coordination training, neuromuscular re-education, postural training, soft tissue mobilization, spinal/joint mobilization, strengthening, stretching and therapeutic activities  Frequency: 2x week  Duration in weeks: 8  Treatment plan discussed with: patient  Plan details: Will follow for optimal gains.  Moderate Evaluation  01589  Re-evaluation   64299  Therapeutic exercise  19925  Therapeutic activity    28971  Neuromuscular re-education   96590  Manual therapy   26856  Gait training  92512    Unattended e-stim (Medicaid/Medicare)     Moist heat/cryotherapy 42369   Ultrasound   42938               Recommendations: Continue as planned  Timeframe: 2 months  Prognosis to achieve goals: good      Timed:         Manual Therapy:         mins  75868;     Therapeutic Exercise:    33     mins  47322;     Neuromuscular  Jazmin:        mins  89148;    Therapeutic Activity:     12     mins  38587;     Gait Training:           mins  85474;     Ultrasound:          mins  68119;    Ionto                              10     mins   56565  Self Care                            mins   31166    Un-Timed:  Electrical Stimulation:         mins  67696 ( );  Dry Needling          mins self-pay  Traction          mins 68360  Re-Eval                               mins  97779  Canalith Repos         mins 20269    Timed Treatment:   55   mins   Total Treatment:     60   mins(5min mh)          PT: Win Rodriguez PT     [place] License:  [id number]    Electronically signed by Win Rodriguez PT, 03/13/25, 10:09 AM EDT    Certification Period: 3/13/2025 thru 6/10/2025  I certify that the therapy services are furnished while this patient is under my care.  The services outlined above are required by this patient, and will be reviewed every 90 days.         Physician Signature:__________________________________________________    PHYSICIAN: Khurram Florez DPM  NPI: [id number]                                      DATE:  :     Please sign and return via fax to [email or web address] . Thank you, [facility]

## 2025-03-18 ENCOUNTER — HOSPITAL ENCOUNTER (OUTPATIENT)
Dept: PHYSICAL THERAPY | Facility: HOSPITAL | Age: 55
Setting detail: THERAPIES SERIES
Discharge: HOME | End: 2025-03-18
Payer: COMMERCIAL

## 2025-03-18 DIAGNOSIS — M25.571 CHRONIC PAIN OF RIGHT ANKLE: Primary | ICD-10-CM

## 2025-03-18 DIAGNOSIS — G89.29 CHRONIC PAIN OF RIGHT ANKLE: Primary | ICD-10-CM

## 2025-03-18 DIAGNOSIS — R26.9 IMPAIRED GAIT: ICD-10-CM

## 2025-03-18 DIAGNOSIS — Z98.890 HISTORY OF ANKLE SURGERY: ICD-10-CM

## 2025-03-18 PROCEDURE — 97112 NEUROMUSCULAR REEDUCATION: CPT | Performed by: PHYSICAL THERAPIST

## 2025-03-18 PROCEDURE — 97110 THERAPEUTIC EXERCISES: CPT | Performed by: PHYSICAL THERAPIST

## 2025-03-18 PROCEDURE — 97140 MANUAL THERAPY 1/> REGIONS: CPT | Performed by: PHYSICAL THERAPIST

## 2025-03-18 NOTE — THERAPY EVALUATION
Physical Therapy Daily Treatment Note      Patient: Jaquan Mckay   : 1970  Referring practitioner: Khurram Florez DPM  Date of Initial Visit: Type: THERAPY  Episode: S/P Right Brostrom/achilles repair  Today's Date: 3/18/2025  Patient seen for 9 sessions       Visit Diagnoses:    ICD-10-CM ICD-9-CM   1. Chronic pain of right ankle  M25.571 719.47    G89.29 338.29   2. Impaired gait  R26.9 781.2   3. History of ankle surgery  Z98.890 V45.89       Subjective Evaluation    History of Present Illness    Subjective comment: Pt has 8/10 pain today.       Objective   See Exercise, Manual, and Modality Logs for complete treatment.       Assessment & Plan       Assessment  Assessment details: Tx initiated with mh to ankle followed by exercises per protocol; followed by proprioceptive training and ended with stm to ankle and ice.  Pt reported 5/10 post pain.    Plan  Plan details: Will follow for improved function.      Addendum; late entry for AP in SOAP for 3/18/25; Win Rodriguez PT    Timed:         Manual Therapy:    12     mins  67232;     Therapeutic Exercise:    31     mins  77972;     Neuromuscular Jazmin:    14    mins  49448;    Therapeutic Activity:          mins  27081;     Gait Training:           mins  10370;     Ultrasound:          mins  34921;    Ionto                                   mins   28629  Self Care                            mins   07508  Canalith Repos         mins 58741      Un-Timed:  Electrical Stimulation:         mins  93562 ( );  Dry Needling          mins self-pay  Traction          mins 42658      Timed Treatment:   57   mins   Total Treatment:     67   mins(5min ice and heat)    Win Rodriguez PT  [place] License: [id number]      Electronically signed by Win Rodriguez PT, 25, 11:03 AM EDT

## 2025-03-20 ENCOUNTER — HOSPITAL ENCOUNTER (OUTPATIENT)
Dept: PHYSICAL THERAPY | Facility: HOSPITAL | Age: 55
Setting detail: THERAPIES SERIES
Discharge: HOME | End: 2025-03-20
Payer: COMMERCIAL

## 2025-03-20 ENCOUNTER — SPECIALTY PHARMACY (OUTPATIENT)
Dept: PHARMACY | Facility: HOSPITAL | Age: 55
End: 2025-03-20
Payer: COMMERCIAL

## 2025-03-20 DIAGNOSIS — M25.571 CHRONIC PAIN OF RIGHT ANKLE: Primary | ICD-10-CM

## 2025-03-20 DIAGNOSIS — R26.9 IMPAIRED GAIT: ICD-10-CM

## 2025-03-20 DIAGNOSIS — Z98.890 HISTORY OF ANKLE SURGERY: ICD-10-CM

## 2025-03-20 DIAGNOSIS — G89.29 CHRONIC PAIN OF RIGHT ANKLE: Primary | ICD-10-CM

## 2025-03-20 PROCEDURE — 97112 NEUROMUSCULAR REEDUCATION: CPT | Performed by: PHYSICAL THERAPIST

## 2025-03-20 PROCEDURE — 97110 THERAPEUTIC EXERCISES: CPT | Performed by: PHYSICAL THERAPIST

## 2025-03-20 PROCEDURE — 97140 MANUAL THERAPY 1/> REGIONS: CPT | Performed by: PHYSICAL THERAPIST

## 2025-03-20 NOTE — PROGRESS NOTES
Specialty Pharmacy Patient Management Program  Refill Outreach     Jaquan was contacted today regarding refills of their medication(s).    Refill Questions      Flowsheet Row Most Recent Value   Changes to allergies? No   Changes to medications? No   New conditions or infections since last clinic visit No   Unplanned office visit, urgent care, ED, or hospital admission in the last 4 weeks  No   How does patient/caregiver feel medication is working? Good   Financial problems or insurance changes  No   Since the previous refill, were any specialty medication doses or scheduled injections missed or delayed?  No   Does this patient require a clinical escalation to a pharmacist? No            Delivery Questions      Flowsheet Row Most Recent Value   Delivery method  at Pharmacy   Delivery address Prescription   Medication(s) being filled and delivered Evolocumab (REPATHA)   Copay verified? Yes   Copay amount $0   Copay form of payment No copayment ($0)   Signature Required No                 Follow-up: 28 day(s)     Ruth Yip, Pharmacy Technician  3/20/2025  09:55 EDT

## 2025-03-20 NOTE — THERAPY EVALUATION
Physical Therapy Daily Treatment Note      Patient: Jaquan Mckay   : 1970  Referring practitioner: Khurram Florez DPM  Date of Initial Visit: Type: THERAPY  Episode: S/P Right Brostrom/achilles repair  Today's Date: 3/20/2025  Patient seen for 10 sessions       Visit Diagnoses:    ICD-10-CM ICD-9-CM   1. Chronic pain of right ankle  M25.571 719.47    G89.29 338.29   2. Impaired gait  R26.9 781.2   3. History of ankle surgery  Z98.890 V45.89       Subjective Evaluation    History of Present Illness    Subjective comment: Pt has 8/10 pain today.       Objective   See Exercise, Manual, and Modality Logs for complete treatment.       Assessment & Plan       Assessment  Assessment details: Tx today consisted of mh to ankle and exercises for improved ankle mobility and stability; followed by proprioceptive training and ended with stm to ankle and ice post tx. Pt reported 6/10 post pain.  Pt noted to have improved balance with tandem standing.    Plan  Plan details: Will follow progressing ankle stability and improved function.          Timed:         Manual Therapy:   12      mins  22061;     Therapeutic Exercise:    31     mins  68201;     Neuromuscular Jazmin:    11    mins  18876;    Therapeutic Activity:          mins  57993;     Gait Training:           mins  35736;     Ultrasound:          mins  76454;    Ionto                                   mins   47205  Self Care                            mins   51368  Canalith Repos         mins 88346      Un-Timed:  Electrical Stimulation:         mins  43825 ( );  Dry Needling          mins self-pay  Traction          mins 42338      Timed Treatment:   55   mins   Total Treatment:     65   mins(5min mh and 5 min ice)    Win Rodriguez PT  [place] License: [id number]      Electronically signed by Win Rodriguez PT, 25, 11:12 AM EDT

## 2025-03-24 ENCOUNTER — SPECIALTY PHARMACY (OUTPATIENT)
Dept: PHARMACY | Facility: HOSPITAL | Age: 55
End: 2025-03-24
Payer: COMMERCIAL

## 2025-03-24 NOTE — PROGRESS NOTES
"   Medication Management Clinic/ Specialty Pharmacy Patient Management Program  Lipid Management Program - PCSK9i follow up Assessment     Jaquan Mckay is a 54 y.o. male referred by their provider, Tiera Valenzuela, to the Hyperlipidemia Patient Management program offered by [facility] for Lipid Management.  Jaquan Mckay is  treated for ASCVD and hyperlipidemia, and currently takes crestor 40 mg.  In the past, Pt has tried lipitor 40 mg, zocor 10 mg, pravastatin 80 mg and is not statin intolerant. The patient denies any allergies to latex.       A follow-up outreach was conducted, including assessment of continued therapy appropriateness, medication adherence, and side effect incidence and management for Repatha. Patient comes to clinic for injections and has not missed any doses.  Repatha was started on 10/29/24, and patient reports tolerating the medication well with no side effects.    He reports he tolerated last Repatha injection without any issues. He denies any side effects.     Changes to Insurance Coverage or Financial Support  [employer] [place] (no change)    Relevant Past Medical History and Comorbidities  Relevant medical history and concomitant health conditions were discussed with the patient. The patient's chart has been reviewed for relevant past medical history and comorbid health conditions and updated as necessary.   Past Medical History:   Diagnosis Date    Allergic     Anxiety     Arthritis     Asthma     Body piercing     REPORTS CYLICONE IN EARS    Clotting disorder 2004    had a knee surgery    Coronary artery disease     Depression     DVT (deep venous thrombosis)     RIGHT RIGHT KNEE AFTER SURGERY YEARS AGO IN 2001 OR 2004    Elevated cholesterol     Gastric ulcer     GERD (gastroesophageal reflux disease)     H/O migraine     Headache     Heart attack     REPORTS \"LIGHT HEART ATTACK A LONG TIME AGO\"  \"EARLY 90'S\"    History of " "seizures     REPORTS LAST EPISODE WAS AROUND 1995.    Hostility     Hyperlipidemia     Hypertension     Knee pain, acute     Left    Low back pain     Lyme disease     Migraine     MRSA (methicillin resistant Staphylococcus aureus)     REPORTS LAST TESTED + 2004. WAS TREATED HE REPORTS.  RIGHT ARM, RIGHT KNEE.    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     Obesity     Poor historian     [place] spotted fever     Seizures     Sleep apnea     Tattoo     Wears glasses      Social History     Socioeconomic History    Marital status:      Spouse name: Becca    Number of children: 2    Years of education: 12   Tobacco Use    Smoking status: Every Day     Current packs/day: 1.00     Average packs/day: 1 pack/day for 18.0 years (18.0 ttl pk-yrs)     Types: Cigars, Cigarettes     Start date: 4/11/2022     Passive exposure: Current    Smokeless tobacco: Never   Vaping Use    Vaping status: Never Used   Substance and Sexual Activity    Alcohol use: No    Drug use: No    Sexual activity: Defer     Partners: Female     Birth control/protection: None          Hospitalizations and Urgent Care Since Last Assessment  ED Visits, Admissions, or Hospitalizations: none  Urgent Office Visits: none    Allergies  Known allergies and reactions were discussed with the patient. The patient's chart has been reviewed for allergy information and updated as necessary.   Allergies   Allergen Reactions    Ciprofloxacin Anaphylaxis and Hives    Miralax [Polyethylene Glycol] Itching and Rash    Mobic [Meloxicam] Other (See Comments)     Pt states, \"It make my feet and hands go numb and I can't hardly walk.\"     Paxil [Paroxetine Hcl] Shortness Of Breath     Chest pain     Peanut-Containing Drug Products Anaphylaxis    Penicillins Anaphylaxis    Pristiq [Desvenlafaxine Succinate Er] Dizziness    Sulfa Antibiotics Anaphylaxis, Itching and Rash    Doxycycline Hives    Fish-Derived Products Hives    Isosorbide Nitrate Rash     Rash, hives, had to use " inhaler.     Lyrica [Pregabalin] Hives    Movantik [Naloxegol] Rash    Seroquel [Quetiapine] Hives and Rash    Trulance [Plecanatide] Hives    Buspar [Buspirone] Rash    Clarithromycin Rash    Clindamycin/Lincomycin Rash    Codeine Rash    Contrast Dye (Echo Or Unknown Ct/Mr) Itching and Rash    Diltiazem Rash    Flomax [Tamsulosin] Hives    Gabapentin Rash    Iodinated Contrast Media Itching and Rash    Keflex [Cephalexin] Rash    Levocetirizine Rash    Linzess [Linaclotide] Rash    Metoprolol Rash    Prednisone Rash and Other (See Comments)     Face, feet, and legs go completely numb per patient    Robitussin Cough+ Chest Max St [Dextromethorphan-Guaifenesin] Itching    Shrimp (Diagnostic) Rash    Spironolactone Rash    Viibryd [Vilazodone Hcl] Itching and Rash    Zoloft [Sertraline Hcl] Hives and Itching          Relevant Laboratory Values  Relevant laboratory values were discussed with the patient. The following specialty medication dose adjustment(s) are recommended: none    Lab Results   Component Value Date    GLUCOSE 95 02/12/2025    CALCIUM 9.1 02/12/2025     02/12/2025    K 3.9 02/12/2025    CO2 24.7 02/12/2025     02/12/2025    BUN 15 02/12/2025    CREATININE 0.93 02/12/2025    EGFRIFAFRI  08/26/2016      Comment:      <15 Indicative of kidney failure.    EGFRIFNONA 66 02/02/2022    BCR 16.1 02/12/2025    ANIONGAP 11.3 02/12/2025     Lab Results   Component Value Date    CHOL 144 01/31/2025    CHLPL 232 (H) 04/05/2016    TRIG 87 01/31/2025    HDL 64 (H) 01/31/2025    LDL 64 01/31/2025       Current Medication List  This medication list has been reviewed with the patient and evaluated for any interactions or necessary modifications/recommendations, and updated to include all prescription medications, OTC medications, and supplements the patient is currently taking.  This list reflects what is contained in the patient's profile, which has also been marked as reviewed to communicate to other  providers it is the most up to date version of the patient's current medication therapy.     Current Outpatient Medications:     albuterol sulfate  (90 Base) MCG/ACT inhaler, Inhale 2 puffs by mouth every 4 hours as needed., Disp: 18 g, Rfl: 3    Alcohol Swabs (Alcohol Prep) 70 % pads, Use 1 each 2 (Two) Times a Day., Disp: 60 each, Rfl: 5    aspirin (Aspirin EC Adult Low Dose) 81 MG EC tablet, Take 1 tablet by mouth Daily., Disp: 30 tablet, Rfl: 3    aspirin 325 MG tablet, Take 1 tablet by mouth Daily., Disp: 30 tablet, Rfl: 0    azelastine (ASTELIN) 0.1 % nasal spray, , Disp: , Rfl:     baclofen (LIORESAL) 20 MG tablet, Take 1 tablet by mouth 3 (Three) Times a Day., Disp: 15 tablet, Rfl: 0    buPROPion XL (Wellbutrin XL) 150 MG 24 hr tablet, Take 1 tablet by mouth Daily., Disp: 30 tablet, Rfl: 0    cloNIDine (Catapres) 0.1 MG tablet, Take 1 tablet by mouth 2 (Two) Times a Day As Needed for High Blood Pressure (if bp more than 140/90)., Disp: 60 tablet, Rfl: 1    dexlansoprazole (Dexilant) 60 MG capsule, Take 1 capsule by mouth 2 (Two) Times a Day., Disp: 60 capsule, Rfl: 5    Dilantin 100 MG capsule, Take 2 capsules by mouth 2 (Two) Times a Day., Disp: 120 capsule, Rfl: 5    diphenhydrAMINE (Benadryl Allergy) 25 MG tablet, Take 1-2 tablets by mouth Every 8 (Eight) Hours As Needed for Itching (or rash)., Disp: 120 tablet, Rfl: 2    docusate calcium (SURFAK) 240 MG capsule, Take 1 capsule by mouth 2 (Two) Times a Day As Needed for Constipation., Disp: 60 capsule, Rfl: 2    Elastic Bandages & Supports (ACE Ankle Brace) misc, 1 each Daily., Disp: 1 each, Rfl: 0    EPINEPHrine (EPIPEN) 0.3 MG/0.3ML solution auto-injector injection, Inject Intramuscularly into lateral thigh as needed for treatment of anaphylaxis, then go to the ER or call 911., Disp: 2 each, Rfl: 2    ergocalciferol (ERGOCALCIFEROL) 1.25 MG (13755 UT) capsule, Take 1 capsule by mouth 1 (One) Time Per Week., Disp: 4 capsule, Rfl: 3    Evolocumab  (REPATHA) solution auto-injector SureClick injection, Inject 1 mL under the skin into the appropriate area as directed Every 14 (Fourteen) Days., Disp: 2 mL, Rfl: 5    fluticasone (FLONASE) 50 MCG/ACT nasal spray, Use 1 spray in each nostril Twice a day for 30 days, Disp: 16 g, Rfl: 11    fluticasone (Flovent HFA) 110 MCG/ACT inhaler, Inhale 1 puff by mouth 2 (Two) Times a Day., Disp: 12 g, Rfl: 5    fluticasone (FLOVENT HFA) 44 MCG/ACT inhaler, Inhale 1 puff by mouth twice a day., Disp: 10.6 g, Rfl: 5    glucose blood (OneTouch Ultra) test strip, Use as instructed 2 times daily and as needed, Disp: 100 each, Rfl: 3    HYDROcodone-acetaminophen (Norco) 7.5-325 MG per tablet, Take 1 tablet by mouth every 6 (six) hours as needed for pain for up to 10 days. Max Daily Amount: 4 tablets, Disp: 25 tablet, Rfl: 0    ibuprofen (ADVIL,MOTRIN) 800 MG tablet, Take 1 tablet by mouth 2 (Two) Times a Day As Needed., Disp: 40 tablet, Rfl: 0    ibuprofen (ADVIL,MOTRIN) 800 MG tablet, Take 1 tablet by mouth Every 6 (Six) Hours As Needed., Disp: 40 tablet, Rfl: 1    ipratropium-albuterol (Combivent Respimat)  MCG/ACT inhaler, INHALE 1 PUFF BY MOUTH 4 (FOUR) TIMES A DAY AS NEEDED FOR WHEEZING., Disp: 4 g, Rfl: 5    ipratropium-albuterol (DUO-NEB) 0.5-2.5 mg/3 ml nebulizer, Inhale the contents of 3 mL vial nebulizer every 6 hours as needed., Disp: 360 mL, Rfl: 3    Lancets (OneTouch Delica Plus Fruogb46I) misc, Use as instructed 2 times daily and as needed, Disp: 100 each, Rfl: 3    levocetirizine (XYZAL) 5 MG tablet, Take 1 tablet by mouth in the evening Once a day 30 days, Disp: 30 tablet, Rfl: 11    lisinopril (PRINIVIL,ZESTRIL) 30 MG tablet, Take 1 tablet by mouth Daily for blood pressure., Disp: 90 tablet, Rfl: 1    mirtazapine (REMERON) 30 MG tablet, Take 1 & 1/2  tablets by mouth Every Night., Disp: 45 tablet, Rfl: 5    montelukast (SINGULAIR) 10 MG tablet, Take 1 tablet by mouth., Disp: , Rfl:     multivitamin with  minerals tablet tablet, Take 1 tablet by mouth Daily., Disp: 30 tablet, Rfl: 12    ondansetron (Zofran) 4 MG tablet, Take 1 tablet by mouth Every 8 (Eight) Hours As Needed for Nausea., Disp: 20 tablet, Rfl: 0    rosuvastatin (Crestor) 40 MG tablet, Take 1 tablet by mouth Daily., Disp: 30 tablet, Rfl: 3    sodium chloride 0.65 % nasal spray, Use 1-3 sprays in each nostril Three times a day as needed 30 days, Disp: 44 mL, Rfl: 11    tamsulosin (FLOMAX) 0.4 MG capsule 24 hr capsule, Take 1 capsule by mouth Daily., Disp: 90 capsule, Rfl: 3    temazepam (RESTORIL) 30 MG capsule, Take 1 capsule by mouth At Night As Needed for Sleep., Disp: 30 capsule, Rfl: 0    tiZANidine (Zanaflex) 4 MG tablet, Take 1 tablet by mouth 3 (Three) Times a Day., Disp: 30 tablet, Rfl: 5    vitamin C (ASCORBIC ACID) 500 MG tablet, Take 1 tablet by mouth 2 (Two) Times a Day., Disp: 60 tablet, Rfl: 1         Drug Interactions  No significant drug-drug interactions with Repatha according to literature.     Adverse Drug Reactions        Plan for ADR Management: n/a    Adherence, Self-Administration, and Current Therapy Problems  Adherence related to the patient's specialty therapy was discussed with the patient. The Adherence segment of this outreach has been reviewed and updated.          Additional Barriers to Patient Self-Administration: patient refuses to administer himself  Methods for Supporting Patient Self-Administration: patient comes to [facility] clinic every 2 weeks for injections    Open Medication Therapy Problems  No medication therapy recommendations to display    Goals of Therapy  Goals related to the patient's specialty therapy were discussed with the patient. The Patient Goals segment of this outreach has been reviewed and updated.   Goals Addressed Today    None     LDL was 136 mg/dl on 3/20/24 and has improved to 132 mg/ml on 10/17/24. This lab work was completed prior to starting repatha therapy.    Quality of Life Assessment    Quality of Life related to the patient's enrollment in the patient management program and services provided was discussed with the patient. The QOL segment of this outreach has been reviewed and updated.       Reassessment Plan & Follow-Up  1.Medication Therapy Changes: Patient will continue Repatha 140mg every 2 weeks    A. I administered injection in to the back of his Right arm   2. Related Plans, Therapy Recommendations, or Issues to Be Addressed: none  3. Pharmacist to perform regular assessments no more than (6) months from the previous assessment.  Patient will continue regular follow-up with referring provider.   4. Care Coordinator to set up future refill outreaches, coordinate prescription delivery, and escalate clinical questions to pharmacist.      Attestation      I attest the patient was actively involved in and has agreed to the above plan of care.  If the prescribed therapy is at any point deemed not appropriate based on the current or future assessments, a consultation will be initiated with the patient's specialty care provider to determine the best course of action. The revised plan of therapy will be documented along with any required assessments and/or additional patient education provided.     Patient to follow up for injections in Clinic every 2 weeks    Omaira Schwab RPH  03/24/25  13:24 EDT

## 2025-03-25 ENCOUNTER — HOSPITAL ENCOUNTER (OUTPATIENT)
Dept: PHYSICAL THERAPY | Facility: HOSPITAL | Age: 55
Setting detail: THERAPIES SERIES
Discharge: HOME | End: 2025-03-25
Payer: COMMERCIAL

## 2025-03-25 DIAGNOSIS — G89.29 CHRONIC PAIN OF RIGHT ANKLE: Primary | ICD-10-CM

## 2025-03-25 DIAGNOSIS — R26.89 ANTALGIC GAIT: ICD-10-CM

## 2025-03-25 DIAGNOSIS — M25.571 CHRONIC PAIN OF RIGHT ANKLE: Primary | ICD-10-CM

## 2025-03-25 DIAGNOSIS — Z98.890 HISTORY OF ANKLE SURGERY: ICD-10-CM

## 2025-03-25 PROCEDURE — 97110 THERAPEUTIC EXERCISES: CPT | Performed by: PHYSICAL THERAPIST

## 2025-03-25 PROCEDURE — 97140 MANUAL THERAPY 1/> REGIONS: CPT | Performed by: PHYSICAL THERAPIST

## 2025-03-25 PROCEDURE — 97112 NEUROMUSCULAR REEDUCATION: CPT | Performed by: PHYSICAL THERAPIST

## 2025-03-25 NOTE — THERAPY TREATMENT NOTE
Physical Therapy Daily Treatment Note    Patient: Jaquan Mckay   : 1970  Diagnosis/ICD-10 Code: (M25.571,  G89.29) Chronic pain of right ankle    (Z98.890) History of ankle surgery    (R26.89) Antalgic gait   Referring practitioner: Khurram Florez DPM  Date of Initial Visit: Type: THERAPY  Episode: S/P Right Brostrom/achilles repair  Today's Date: 3/25/2025  Patient seen for 11 sessions               Subjective Pt arrives today and reports initial 7/10 pain and complaints of foot going numb at times.  He reports 5/10 pain post and tolerated session well overall today.     Objective   See Exercise, Manual, and Modality Logs for complete treatment.       Assessment/Plan Pt session initiated with mh followed by gentle stretching, therex to strengthen right ankle and improve ankle stability and range of motion followed by standing activities and proprioceptive/ balance activities.  He cont to have postural sway and increased challenges with balance.  He received STM to ankle and ice post session.  He jose armnado session well overall without complaints.    Progress strengthening /stabilization /functional activity             TIMED  Manual Therapy:    12     mins  48820;  Therapeutic Exercise:    32     mins  25656;     Neuromuscular Jazmin:    10    mins  80882;    Therapeutic Activity:          mins  52662;     Gait Training:           mins  87188;     Ultrasound:          mins  34430;    Attended e-stim                  mins  21544;  Iontophoresis                      mins  94359;    NON-TIMED  Electrical Stimulation:         mins  48015 ( );  Paraffin                               mins 52243                      Mechanical Traction           mins 96964  Dry Needling          mins self-pay    Timed Treatment:   54   mins   Total Treatment:     64   mins (ice/mh)      Electronically signed by:  Sintia Rodriguez, PT  Physical Therapist  [place] [id number]    Referring MD:  Khurram Florez DPM  [address] [place] [address]  [place] [address]

## 2025-03-27 ENCOUNTER — HOSPITAL ENCOUNTER (OUTPATIENT)
Dept: PHYSICAL THERAPY | Facility: HOSPITAL | Age: 55
Setting detail: THERAPIES SERIES
Discharge: HOME | End: 2025-03-27
Payer: COMMERCIAL

## 2025-03-27 DIAGNOSIS — G89.29 CHRONIC PAIN OF RIGHT ANKLE: Primary | ICD-10-CM

## 2025-03-27 DIAGNOSIS — M25.571 CHRONIC PAIN OF RIGHT ANKLE: Primary | ICD-10-CM

## 2025-03-27 DIAGNOSIS — R26.89 ANTALGIC GAIT: ICD-10-CM

## 2025-03-27 DIAGNOSIS — Z98.890 HISTORY OF ANKLE SURGERY: ICD-10-CM

## 2025-03-27 PROCEDURE — 97140 MANUAL THERAPY 1/> REGIONS: CPT | Performed by: PHYSICAL THERAPIST

## 2025-03-27 PROCEDURE — 97112 NEUROMUSCULAR REEDUCATION: CPT | Performed by: PHYSICAL THERAPIST

## 2025-03-27 PROCEDURE — 97110 THERAPEUTIC EXERCISES: CPT | Performed by: PHYSICAL THERAPIST

## 2025-03-27 NOTE — THERAPY EVALUATION
Physical Therapy Daily Treatment Note      Patient: Jaquan Mckay   : 1970  Referring practitioner: Khurram Florez DPM  Date of Initial Visit: Type: THERAPY  Episode: S/P Right Brostrom/achilles repair  Today's Date: 3/27/2025  Patient seen for 12 sessions       Visit Diagnoses:    ICD-10-CM ICD-9-CM   1. Chronic pain of right ankle  M25.571 719.47    G89.29 338.29   2. History of ankle surgery  Z98.890 V45.89   3. Antalgic gait  R26.89 781.2       Subjective Evaluation    History of Present Illness    Subjective comment: Pt hsa 6/10 pain today.       Objective   See Exercise, Manual, and Modality Logs for complete treatment.       Assessment & Plan       Assessment  Assessment details: Tx today initiated with mh to ankle; followed by exercises for improved ankle mobility and stability; proprioceptive training; stm to foot and ankle and ended with ice.  Pt demonstrated good effort and responded well to added LE bike today with 3/10 post pain.    Plan  Plan details: Will follow progressing ankle stability and functional mobility.          Timed:         Manual Therapy:    12     mins  47200;     Therapeutic Exercise:    31     mins  27045;     Neuromuscular Jazmin:    11    mins  93452;    Therapeutic Activity:          mins  48329;     Gait Training:           mins  69329;     Ultrasound:          mins  06819;    Ionto                                   mins   99510  Self Care                            mins   22328  Canalith Repos         mins 61866      Un-Timed:  Electrical Stimulation:         mins  13594 ( );  Dry Needling          mins self-pay  Traction          mins 47155      Timed Treatment:   54   mins   Total Treatment:     60   mins(6min mh)    Win Rodriguez PT  [place] License: [id number]      Electronically signed by Win Rodriguez PT, 25, 11:01 AM EDT

## 2025-04-01 ENCOUNTER — HOSPITAL ENCOUNTER (OUTPATIENT)
Dept: PHYSICAL THERAPY | Facility: HOSPITAL | Age: 55
Setting detail: THERAPIES SERIES
Discharge: HOME | End: 2025-04-01
Payer: COMMERCIAL

## 2025-04-01 DIAGNOSIS — G89.29 CHRONIC PAIN OF RIGHT ANKLE: Primary | ICD-10-CM

## 2025-04-01 DIAGNOSIS — M25.571 CHRONIC PAIN OF RIGHT ANKLE: Primary | ICD-10-CM

## 2025-04-01 DIAGNOSIS — R26.89 ANTALGIC GAIT: ICD-10-CM

## 2025-04-01 DIAGNOSIS — Z98.890 HISTORY OF ANKLE SURGERY: ICD-10-CM

## 2025-04-01 PROCEDURE — 97140 MANUAL THERAPY 1/> REGIONS: CPT | Performed by: PHYSICAL THERAPIST

## 2025-04-01 PROCEDURE — 97110 THERAPEUTIC EXERCISES: CPT | Performed by: PHYSICAL THERAPIST

## 2025-04-01 NOTE — THERAPY TREATMENT NOTE
Physical Therapy Daily Treatment Note      Patient: Jaquan Mckay   : 1970  Referring practitioner: Khurram Florez DPM  Date of Initial Visit: Type: THERAPY  Episode: S/P Right Brostrom/achilles repair  Today's Date: 2025  Patient seen for 13 sessions       Visit Diagnoses:    ICD-10-CM ICD-9-CM   1. Chronic pain of right ankle  M25.571 719.47    G89.29 338.29   2. History of ankle surgery  Z98.890 V45.89   3. Antalgic gait  R26.89 781.2       Subjective Evaluation    History of Present Illness    Subjective comment: Pt reports 6/10 right ankle pain prior to today's session.Pain  Current pain ratin         Objective   See Exercise, Manual, and Modality Logs for complete treatment.       Assessment & Plan       Assessment  Assessment details: Today's treatment session was initiated with moist heat to the right ankle. STM was then performed, addressing inflammation. Therapeutic exercises were performed for improved right ankle ROM and strength. Therapeutic activities were performed for improved functional mobility and independence. Rest breaks were provided as needed. The patient reported 5/10 right ankle pain following today's session.    Plan  Plan details: Progress as indicated to achieve max function.          Timed:         Manual Therapy:    10     mins  47431;     Therapeutic Exercise:    30     mins  42833;   (lesser charge due to shared minutes)  Neuromuscular Jazmin:        mins  32639;    Therapeutic Activity:     8     mins  24423;   (No charge due to shared minutes)  Gait Training:           mins  39495;     Ultrasound:          mins  78459;    Ionto                                   mins   62851  Self Care                            mins   47569  Canalith Repos         mins 48253      Un-Timed:  Electrical Stimulation:         mins  84413 ( );  Dry Needling          mins self-pay  Traction          mins 98068      Timed Treatment:   48   mins   Total Treatment:     53   mins (5  minutes MH)    Ashley Claudene Dalton, PT  [place] License: [id number]      Electronically signed by Ashley Claudene Dalton, PT, 04/01/25, 10:30 AM EDT

## 2025-04-03 ENCOUNTER — HOSPITAL ENCOUNTER (OUTPATIENT)
Dept: PHYSICAL THERAPY | Facility: HOSPITAL | Age: 55
Setting detail: THERAPIES SERIES
Discharge: HOME | End: 2025-04-03
Payer: COMMERCIAL

## 2025-04-03 DIAGNOSIS — G89.29 CHRONIC PAIN OF RIGHT ANKLE: Primary | ICD-10-CM

## 2025-04-03 DIAGNOSIS — Z98.890 HISTORY OF ANKLE SURGERY: ICD-10-CM

## 2025-04-03 DIAGNOSIS — M25.571 CHRONIC PAIN OF RIGHT ANKLE: Primary | ICD-10-CM

## 2025-04-03 DIAGNOSIS — R26.89 ANTALGIC GAIT: ICD-10-CM

## 2025-04-03 PROCEDURE — 97110 THERAPEUTIC EXERCISES: CPT | Performed by: PHYSICAL THERAPIST

## 2025-04-03 PROCEDURE — 97140 MANUAL THERAPY 1/> REGIONS: CPT | Performed by: PHYSICAL THERAPIST

## 2025-04-03 NOTE — THERAPY EVALUATION
Physical Therapy Daily Treatment Note      Patient: Jaquan Mckay   : 1970  Referring practitioner: Khurram Florez DPM  Date of Initial Visit: Type: THERAPY  Episode: S/P Right Brostrom/achilles repair  Today's Date: 4/3/2025  Patient seen for 14 sessions       Visit Diagnoses:    ICD-10-CM ICD-9-CM   1. Chronic pain of right ankle  M25.571 719.47    G89.29 338.29   2. History of ankle surgery  Z98.890 V45.89   3. Antalgic gait  R26.89 781.2       Subjective Evaluation    History of Present Illness    Subjective comment: Pt reports having 8/10 pain today.  Pt has performed increased house work and has been carrying a new bookshelf into his home.       Objective   See Exercise, Manual, and Modality Logs for complete treatment.       Assessment & Plan       Assessment  Assessment details: Tx today consisted of mat exercises and sitting exercises for ankle mobility and stability; followed by functional stepping and proprioceptive training and ended with stm and ice for decreased pain.  Pt requested to hold le bike today and demonstrated good effort with decreased pain following session.    Plan  Plan details: Will follow progressing ankle stability and decreased pain.          Timed:         Manual Therapy:    14     mins  10892;     Therapeutic Exercise:    31     mins  26820;     Neuromuscular Jazmin:        mins  62688;    Therapeutic Activity:          mins  84645;     Gait Training:           mins  30563;     Ultrasound:          mins  91378;    Ionto                                   mins   21301  Self Care                            mins   31374  Canalith Repos         mins 29885      Un-Timed:  Electrical Stimulation:         mins  82283 (MC );  Dry Needling          mins self-pay  Traction          mins 52258      Timed Treatment:   45  mins   Total Treatment:     57   mins(6 min mh and 6 min ice)    Win Rodriguez PT  [place] License: [id number]      Electronically signed by Win Rodriguez,  PT, 04/03/25, 11:01 AM EDT

## 2025-04-08 ENCOUNTER — APPOINTMENT (OUTPATIENT)
Dept: PHYSICAL THERAPY | Facility: HOSPITAL | Age: 55
End: 2025-04-08
Payer: COMMERCIAL

## 2025-04-08 ENCOUNTER — SPECIALTY PHARMACY (OUTPATIENT)
Dept: PHARMACY | Facility: HOSPITAL | Age: 55
End: 2025-04-08
Payer: COMMERCIAL

## 2025-04-08 NOTE — PROGRESS NOTES
[facility] Patient Management Program  Lipid Management Program - PCSK9i Initial Assessment     Jaquan Mckay is a 54 y.o. male referred by their provider, Tiera Valenzuela, to the Hyperlipidemia Patient Management program offered by [facility] for Lipid Management.  Jaquan Mckay is  treated for ASCVD and hyperlipidemia, and currently takes Repatha 140 mg every 2 weeks and Crestor 40 mg. In the past, Pt has tried lipitor 40 mg, zocor 10 mg, pravastatin 80 mg and is not statin intolerant. The patient denies any allergies to latex.     A follow-up outreach was conducted in clinic, including assessment of continued therapy appropriateness, medication adherence, and side effect incidence and management for Repatha. The patient denies any trouble giving themself the injection.  They deny missing doses or adverse effects. The patient receives injections in clinic and does not miss doses.    Patient reports he has a colonoscopy and upper GI test scheduled for 4/24/25. He will be completing the bowel prep on 4/23/25 and will not be able to leave his home that day.    Changes to Insurance Coverage or Financial Support  [employer] better [employer] (no changes)    Relevant Past Medical History and Comorbidities  Relevant medical history and concomitant health conditions were discussed with the patient. The patient's chart has been reviewed for relevant past medical history and comorbid health conditions and updated as necessary.   Past Medical History:   Diagnosis Date    Allergic     Anxiety     Arthritis     Asthma     Body piercing     REPORTS CYLICONE IN EARS    Clotting disorder 2004    had a knee surgery    Coronary artery disease     Depression     DVT (deep venous thrombosis)     RIGHT RIGHT KNEE AFTER SURGERY YEARS AGO IN 2001 OR 2004    Elevated cholesterol     Gastric ulcer     GERD (gastroesophageal reflux disease)     H/O  "migraine     Headache     Heart attack     REPORTS \"LIGHT HEART ATTACK A LONG TIME AGO\"  \"EARLY 90'S\"    History of seizures     REPORTS LAST EPISODE WAS AROUND 1995.    Hostility     Hyperlipidemia     Hypertension     Knee pain, acute     Left    Low back pain     Lyme disease     Migraine     MRSA (methicillin resistant Staphylococcus aureus)     REPORTS LAST TESTED + 2004. WAS TREATED HE REPORTS.  RIGHT ARM, RIGHT KNEE.    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     Obesity     Poor historian     [place] spotted fever     Seizures     Sleep apnea     Tattoo     Wears glasses      Social History     Socioeconomic History    Marital status:      Spouse name: Becca    Number of children: 2    Years of education: 12   Tobacco Use    Smoking status: Every Day     Current packs/day: 1.00     Average packs/day: 1 pack/day for 18.1 years (18.1 ttl pk-yrs)     Types: Cigars, Cigarettes     Start date: 4/11/2022     Passive exposure: Current    Smokeless tobacco: Never   Vaping Use    Vaping status: Never Used   Substance and Sexual Activity    Alcohol use: No    Drug use: No    Sexual activity: Defer     Partners: Female     Birth control/protection: None          Hospitalizations and Urgent Care Since Last Assessment  ED Visits, Admissions, or Hospitalizations:   ED 12/10/24-fall/ injury of right lower extremity  ED 12/27/25- problem with fiberglass cast  ED 12/30/24- cast removal  Urgent Office Visits: none    Allergies  Known allergies and reactions were discussed with the patient. The patient's chart has been reviewed for allergy information and updated as necessary.   Allergies   Allergen Reactions    Ciprofloxacin Anaphylaxis and Hives    Miralax [Polyethylene Glycol] Itching and Rash    Mobic [Meloxicam] Other (See Comments)     Pt states, \"It make my feet and hands go numb and I can't hardly walk.\"     Paxil [Paroxetine Hcl] Shortness Of Breath     Chest pain     Peanut-Containing Drug Products Anaphylaxis    " Penicillins Anaphylaxis    Pristiq [Desvenlafaxine Succinate Er] Dizziness    Sulfa Antibiotics Anaphylaxis, Itching and Rash    Doxycycline Hives    Fish-Derived Products Hives    Isosorbide Nitrate Rash     Rash, hives, had to use inhaler.     Lyrica [Pregabalin] Hives    Movantik [Naloxegol] Rash    Seroquel [Quetiapine] Hives and Rash    Trulance [Plecanatide] Hives    Buspar [Buspirone] Rash    Clarithromycin Rash    Clindamycin/Lincomycin Rash    Codeine Rash    Contrast Dye (Echo Or Unknown Ct/Mr) Itching and Rash    Diltiazem Rash    Flomax [Tamsulosin] Hives    Gabapentin Rash    Iodinated Contrast Media Itching and Rash    Keflex [Cephalexin] Rash    Levocetirizine Rash    Linzess [Linaclotide] Rash    Metoprolol Rash    Prednisone Rash and Other (See Comments)     Face, feet, and legs go completely numb per patient    Robitussin Cough+ Chest Max St [Dextromethorphan-Guaifenesin] Itching    Shrimp (Diagnostic) Rash    Spironolactone Rash    Viibryd [Vilazodone Hcl] Itching and Rash    Zoloft [Sertraline Hcl] Hives and Itching          Relevant Laboratory Values  Relevant laboratory values were discussed with the patient. The following specialty medication dose adjustment(s) are recommended: none    Lab Results   Component Value Date    GLUCOSE 95 02/12/2025    CALCIUM 9.1 02/12/2025     02/12/2025    K 3.9 02/12/2025    CO2 24.7 02/12/2025     02/12/2025    BUN 15 02/12/2025    CREATININE 0.93 02/12/2025    EGFRIFAFRI  08/26/2016      Comment:      <15 Indicative of kidney failure.    EGFRIFNONA 66 02/02/2022    BCR 16.1 02/12/2025    ANIONGAP 11.3 02/12/2025     Lab Results   Component Value Date    CHOL 144 01/31/2025    CHLPL 232 (H) 04/05/2016    TRIG 87 01/31/2025    HDL 64 (H) 01/31/2025    LDL 64 01/31/2025     Current Medication List  This medication list has been reviewed with the patient and evaluated for any interactions or necessary modifications/recommendations, and updated to  include all prescription medications, OTC medications, and supplements the patient is currently taking.  This list reflects what is contained in the patient's profile, which has also been marked as reviewed to communicate to other providers it is the most up to date version of the patient's current medication therapy.     Current Outpatient Medications:     albuterol sulfate  (90 Base) MCG/ACT inhaler, Inhale 2 puffs by mouth every 4 hours as needed., Disp: 18 g, Rfl: 3    Alcohol Swabs (Alcohol Prep) 70 % pads, Use 1 each 2 (Two) Times a Day., Disp: 60 each, Rfl: 5    amitriptyline (ELAVIL) 10 MG tablet, Take 1 tablet by mouth Every Night., Disp: 30 tablet, Rfl: 0    aspirin (Aspirin EC Adult Low Dose) 81 MG EC tablet, Take 1 tablet by mouth Daily., Disp: 30 tablet, Rfl: 3    aspirin 325 MG tablet, Take 1 tablet by mouth Daily., Disp: 30 tablet, Rfl: 0    azelastine (ASTELIN) 0.1 % nasal spray, , Disp: , Rfl:     cloNIDine (Catapres) 0.1 MG tablet, Take 1 tablet by mouth 2 (Two) Times a Day As Needed for High Blood Pressure (if bp more than 140/90)., Disp: 60 tablet, Rfl: 1    dexAMETHasone (DECADRON) 4 MG/ML injection, Use 1 mL at Physical Therapy for ionthophoresis., Disp: 40 mL, Rfl: 0    dexlansoprazole (Dexilant) 60 MG capsule, Take 1 capsule by mouth 2 (Two) Times a Day., Disp: 60 capsule, Rfl: 5    Diclofenac Sodium (VOLTAREN) 1 % gel gel, Apply 2 grams topically to the affect area as directed 4 (Four) Times a Day., Disp: 200 g, Rfl: 2    Dilantin 100 MG capsule, Take 2 capsules by mouth 2 (Two) Times a Day., Disp: 120 capsule, Rfl: 5    diphenhydrAMINE (Benadryl Allergy) 25 MG tablet, Take 1-2 tablets by mouth Every 8 (Eight) Hours As Needed for Itching (or rash)., Disp: 120 tablet, Rfl: 2    docusate calcium (SURFAK) 240 MG capsule, Take 1 capsule by mouth 2 (Two) Times a Day As Needed for Constipation., Disp: 60 capsule, Rfl: 2    Elastic Bandages & Supports (ACE Ankle Brace) misc, 1 each Daily.,  Disp: 1 each, Rfl: 0    EPINEPHrine (EPIPEN) 0.3 MG/0.3ML solution auto-injector injection, Inject Intramuscularly into lateral thigh as needed for treatment of anaphylaxis, then go to the ER or call 911., Disp: 2 each, Rfl: 2    ergocalciferol (ERGOCALCIFEROL) 1.25 MG (06394 UT) capsule, Take 1 capsule by mouth 1 (One) Time Per Week., Disp: 4 capsule, Rfl: 3    Evolocumab (REPATHA) solution auto-injector SureClick injection, Inject 1 mL under the skin into the appropriate area as directed Every 14 (Fourteen) Days., Disp: 2 mL, Rfl: 5    fluticasone (FLONASE) 50 MCG/ACT nasal spray, Use 1 spray in each nostril Twice a day for 30 days, Disp: 16 g, Rfl: 11    fluticasone (Flovent HFA) 110 MCG/ACT inhaler, Inhale 1 puff by mouth 2 (Two) Times a Day., Disp: 12 g, Rfl: 5    fluticasone (FLOVENT HFA) 44 MCG/ACT inhaler, Inhale 1 puff by mouth twice a day., Disp: 10.6 g, Rfl: 5    glucose blood (OneTouch Ultra) test strip, Use as instructed 2 times daily and as needed, Disp: 100 each, Rfl: 3    HYDROcodone-acetaminophen (NORCO) 5-325 MG per tablet, Take 1 tablet by mouth Every 8 (Eight) Hours for 10 days  (max daily dose of 3 tablets)., Disp: 30 tablet, Rfl: 0    HYDROcodone-acetaminophen (Norco) 7.5-325 MG per tablet, Take 1 tablet by mouth every 6 (six) hours as needed for pain for up to 10 days. Max Daily Amount: 4 tablets, Disp: 25 tablet, Rfl: 0    ibuprofen (ADVIL,MOTRIN) 800 MG tablet, Take 1 tablet by mouth 2 (Two) Times a Day As Needed., Disp: 40 tablet, Rfl: 0    ibuprofen (ADVIL,MOTRIN) 800 MG tablet, Take 1 tablet by mouth Every 6 (Six) Hours As Needed., Disp: 40 tablet, Rfl: 1    ipratropium-albuterol (Combivent Respimat)  MCG/ACT inhaler, INHALE 1 PUFF BY MOUTH 4 (FOUR) TIMES A DAY AS NEEDED FOR WHEEZING., Disp: 4 g, Rfl: 5    ipratropium-albuterol (DUO-NEB) 0.5-2.5 mg/3 ml nebulizer, Inhale the contents of 3 mL vial nebulizer every 6 hours as needed., Disp: 360 mL, Rfl: 3    Lancets (OneTouch Delica  Plus [id number]) misc, Use as instructed 2 times daily and as needed, Disp: 100 each, Rfl: 3    levocetirizine (XYZAL) 5 MG tablet, Take 1 tablet by mouth in the evening Once a day 30 days, Disp: 30 tablet, Rfl: 11    lisinopril (PRINIVIL,ZESTRIL) 30 MG tablet, Take 1 tablet by mouth Daily for blood pressure., Disp: 90 tablet, Rfl: 1    loratadine (CLARITIN) 10 MG tablet, Take 1 tablet by mouth Daily As Needed for Allergies., Disp: 30 tablet, Rfl: 5    methocarbamol (ROBAXIN) 750 MG tablet, Take 1 tablet by mouth 3 (Three) Times a Day., Disp: 90 tablet, Rfl: 0    mirtazapine (REMERON) 30 MG tablet, Take 1 & 1/2  tablets by mouth Every Night., Disp: 45 tablet, Rfl: 5    montelukast (SINGULAIR) 10 MG tablet, Take 1 tablet by mouth., Disp: , Rfl:     multivitamin with minerals tablet tablet, Take 1 tablet by mouth Daily., Disp: 30 tablet, Rfl: 12    ondansetron (Zofran) 4 MG tablet, Take 1 tablet by mouth Every 8 (Eight) Hours As Needed for Nausea., Disp: 20 tablet, Rfl: 0    rosuvastatin (Crestor) 40 MG tablet, Take 1 tablet by mouth Daily., Disp: 30 tablet, Rfl: 3    sodium chloride 0.65 % nasal spray, Use 1-3 sprays in each nostril Three times a day as needed 30 days, Disp: 44 mL, Rfl: 11    tamsulosin (FLOMAX) 0.4 MG capsule 24 hr capsule, Take 1 capsule by mouth Daily., Disp: 90 capsule, Rfl: 3    temazepam (RESTORIL) 30 MG capsule, Take 1 capsule by mouth At Night As Needed for Sleep., Disp: 30 capsule, Rfl: 0    vitamin C (ASCORBIC ACID) 500 MG tablet, Take 1 tablet by mouth 2 (Two) Times a Day., Disp: 60 tablet, Rfl: 1         Drug Interactions  None with repatha    Adverse Drug Reactions        Plan for ADR Management: n/a    Adherence, Self-Administration, and Current Therapy Problems  Adherence related to the patient's specialty therapy was discussed with the patient. The Adherence segment of this outreach has been reviewed and updated.          Additional Barriers to Patient Self-Administration: patient  refuses to give injections himself  Methods for Supporting Patient Self-Administration: patient comes to clinic to receive injections every 2 weeks    Open Medication Therapy Problems  No medication therapy recommendations to display    Goals of Therapy  Goals related to the patient's specialty therapy were discussed with the patient. The Patient Goals segment of this outreach has been reviewed and updated.   Goals Addressed Today    None         Quality of Life Assessment   Quality of Life related to the patient's enrollment in the patient management program and services provided was discussed with the patient. The QOL segment of this outreach has been reviewed and updated.       Reassessment Plan & Follow-Up  1. Medication Therapy Changes: none reported  2. Related Plans, Therapy Recommendations, or Issues to Be Addressed:   LDL at goal  I administered the injection into the back of the patient's LEFT arm today  Next appointment will have to be at 3 weeks instead of 2 weeks due to colonoscopy scheduled for 4/24/25. Patient has other appointments scheduled for 4/22/25 and will be completing bowel prep on 4/23/25 and cannot leave home that day  6 months of refills sent to [facility]  3. Pharmacist to perform regular assessments no more than (6) months from the previous assessment.  Patient will continue regular follow-up with referring provider.   4. Care Coordinator to set up future refill outreaches, coordinate prescription delivery, and escalate clinical questions to pharmacist.    Attestation      I attest the patient was actively involved in and has agreed to the above plan of care.  If the prescribed therapy is at any point deemed not appropriate based on the current or future assessments, a consultation will be initiated with the patient's specialty care provider to determine the best course of action. The revised plan of therapy will be documented along with any required assessments  and/or additional patient education provided.     Melinda Case, PharmD  4/8/2025  13:51 EDT

## 2025-04-08 NOTE — PROGRESS NOTES
[facility]ication Management Clinic/ [facility]ty Pharmacy Patient Management Program  Lipid Management Program - PCSK9i Initial Assessment     Jaquan Mckay is a 54 y.o. male referred by their provider, Tiera Valenzuela, to the Hyperlipidemia Patient Management program offered by [facility] for Lipid Management.  Jaquan Mckay is  treated for ASCVD and hyperlipidemia, and currently takes Repatha 140 mg every 2 weeks and Crestor 40 mg. In the past, Pt has tried lipitor 40 mg, zocor 10 mg, pravastatin 80 mg and is not statin intolerant. The patient denies any allergies to latex.     A follow-up outreach was conducted in clinic, including assessment of continued therapy appropriateness, medication adherence, and side effect incidence and management for Repatha. The patient denies any trouble giving themself the injection.  They deny missing doses or adverse effects. The patient receives injections in clinic and does not miss doses.    Patient reports he has a colonoscopy and upper GI test scheduled for 4/24/25. He will be completing the bowel prep on 4/23/25 and will not be able to leave his home that day.    Changes to Insurance Coverage or Financial Support  [employer] better [employer] (no changes)    Relevant Past Medical History and Comorbidities  Relevant medical history and concomitant health conditions were discussed with the patient. The patient's chart has been reviewed for relevant past medical history and comorbid health conditions and updated as necessary.   Past Medical History:   Diagnosis Date    Allergic     Anxiety     Arthritis     Asthma     Body piercing     REPORTS CYLICONE IN EARS    Clotting disorder 2004    had a knee surgery    Coronary artery disease     Depression     DVT (deep venous thrombosis)     RIGHT RIGHT KNEE AFTER SURGERY YEARS AGO IN 2001 OR 2004    Elevated cholesterol     Gastric ulcer     GERD (gastroesophageal reflux disease)     H/O  "migraine     Headache     Heart attack     REPORTS \"LIGHT HEART ATTACK A LONG TIME AGO\"  \"EARLY 90'S\"    History of seizures     REPORTS LAST EPISODE WAS AROUND 1995.    Hostility     Hyperlipidemia     Hypertension     Knee pain, acute     Left    Low back pain     Lyme disease     Migraine     MRSA (methicillin resistant Staphylococcus aureus)     REPORTS LAST TESTED + 2004. WAS TREATED HE REPORTS.  RIGHT ARM, RIGHT KNEE.    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     Obesity     Poor historian     [place] spotted fever     Seizures     Sleep apnea     Tattoo     Wears glasses      Social History     Socioeconomic History    Marital status:      Spouse name: Becca    Number of children: 2    Years of education: 12   Tobacco Use    Smoking status: Every Day     Current packs/day: 1.00     Average packs/day: 1 pack/day for 18.1 years (18.1 ttl pk-yrs)     Types: Cigars, Cigarettes     Start date: 4/11/2022     Passive exposure: Current    Smokeless tobacco: Never   Vaping Use    Vaping status: Never Used   Substance and Sexual Activity    Alcohol use: No    Drug use: No    Sexual activity: Defer     Partners: Female     Birth control/protection: None     Problem list reviewed by Melinda Case, PharmD on 4/8/2025 at  1:35 PM  Problem list reviewed by Melinda Case, PharmD on 4/8/2025 at  1:42 PM    Hospitalizations and Urgent Care Since Last Assessment  ED Visits, Admissions, or Hospitalizations:   ED 12/10/24-fall/ injury of right lower extremity  ED 12/27/25- problem with fiberglass cast  ED 12/30/24- cast removal  Urgent Office Visits: none    Allergies  Known allergies and reactions were discussed with the patient. The patient's chart has been reviewed for allergy information and updated as necessary.   Allergies   Allergen Reactions    Ciprofloxacin Anaphylaxis and Hives    Miralax [Polyethylene Glycol] Itching and Rash    Mobic [Meloxicam] Other (See Comments)     Pt states, \"It make my feet and hands go " "numb and I can't hardly walk.\"     Paxil [Paroxetine Hcl] Shortness Of Breath     Chest pain     Peanut-Containing Drug Products Anaphylaxis    Penicillins Anaphylaxis    Pristiq [Desvenlafaxine Succinate Er] Dizziness    Sulfa Antibiotics Anaphylaxis, Itching and Rash    Doxycycline Hives    Fish-Derived Products Hives    Isosorbide Nitrate Rash     Rash, hives, had to use inhaler.     Lyrica [Pregabalin] Hives    Movantik [Naloxegol] Rash    Seroquel [Quetiapine] Hives and Rash    Trulance [Plecanatide] Hives    Buspar [Buspirone] Rash    Clarithromycin Rash    Clindamycin/Lincomycin Rash    Codeine Rash    Contrast Dye (Echo Or Unknown Ct/Mr) Itching and Rash    Diltiazem Rash    Flomax [Tamsulosin] Hives    Gabapentin Rash    Iodinated Contrast Media Itching and Rash    Keflex [Cephalexin] Rash    Levocetirizine Rash    Linzess [Linaclotide] Rash    Metoprolol Rash    Prednisone Rash and Other (See Comments)     Face, feet, and legs go completely numb per patient    Robitussin Cough+ Chest Max St [Dextromethorphan-Guaifenesin] Itching    Shrimp (Diagnostic) Rash    Spironolactone Rash    Viibryd [Vilazodone Hcl] Itching and Rash    Zoloft [Sertraline Hcl] Hives and Itching     Allergies reviewed by Melinda Case, PharmD on 4/8/2025 at  1:34 PM    Relevant Laboratory Values  Relevant laboratory values were discussed with the patient. The following specialty medication dose adjustment(s) are recommended: none    Lab Results   Component Value Date    GLUCOSE 95 02/12/2025    CALCIUM 9.1 02/12/2025     02/12/2025    K 3.9 02/12/2025    CO2 24.7 02/12/2025     02/12/2025    BUN 15 02/12/2025    CREATININE 0.93 02/12/2025    EGFRIFAFRI  08/26/2016      Comment:      <15 Indicative of kidney failure.    EGFRIFNONA 66 02/02/2022    BCR 16.1 02/12/2025    ANIONGAP 11.3 02/12/2025     Lab Results   Component Value Date    CHOL 144 01/31/2025    CHLPL 232 (H) 04/05/2016    TRIG 87 01/31/2025    HDL 64 (H) " 01/31/2025    LDL 64 01/31/2025     Current Medication List  This medication list has been reviewed with the patient and evaluated for any interactions or necessary modifications/recommendations, and updated to include all prescription medications, OTC medications, and supplements the patient is currently taking.  This list reflects what is contained in the patient's profile, which has also been marked as reviewed to communicate to other providers it is the most up to date version of the patient's current medication therapy.     Current Outpatient Medications:     albuterol sulfate  (90 Base) MCG/ACT inhaler, Inhale 2 puffs by mouth every 4 hours as needed., Disp: 18 g, Rfl: 3    Alcohol Swabs (Alcohol Prep) 70 % pads, Use 1 each 2 (Two) Times a Day., Disp: 60 each, Rfl: 5    amitriptyline (ELAVIL) 10 MG tablet, Take 1 tablet by mouth Every Night., Disp: 30 tablet, Rfl: 0    aspirin (Aspirin EC Adult Low Dose) 81 MG EC tablet, Take 1 tablet by mouth Daily., Disp: 30 tablet, Rfl: 3    aspirin 325 MG tablet, Take 1 tablet by mouth Daily., Disp: 30 tablet, Rfl: 0    azelastine (ASTELIN) 0.1 % nasal spray, , Disp: , Rfl:     cloNIDine (Catapres) 0.1 MG tablet, Take 1 tablet by mouth 2 (Two) Times a Day As Needed for High Blood Pressure (if bp more than 140/90)., Disp: 60 tablet, Rfl: 1    dexAMETHasone (DECADRON) 4 MG/ML injection, Use 1 mL at Physical Therapy for ionthophoresis., Disp: 40 mL, Rfl: 0    dexlansoprazole (Dexilant) 60 MG capsule, Take 1 capsule by mouth 2 (Two) Times a Day., Disp: 60 capsule, Rfl: 5    Diclofenac Sodium (VOLTAREN) 1 % gel gel, Apply 2 grams topically to the affect area as directed 4 (Four) Times a Day., Disp: 200 g, Rfl: 2    Dilantin 100 MG capsule, Take 2 capsules by mouth 2 (Two) Times a Day., Disp: 120 capsule, Rfl: 5    diphenhydrAMINE (Benadryl Allergy) 25 MG tablet, Take 1-2 tablets by mouth Every 8 (Eight) Hours As Needed for Itching (or rash)., Disp: 120 tablet, Rfl: 2     docusate calcium (SURFAK) 240 MG capsule, Take 1 capsule by mouth 2 (Two) Times a Day As Needed for Constipation., Disp: 60 capsule, Rfl: 2    Elastic Bandages & Supports (ACE Ankle Brace) misc, 1 each Daily., Disp: 1 each, Rfl: 0    EPINEPHrine (EPIPEN) 0.3 MG/0.3ML solution auto-injector injection, Inject Intramuscularly into lateral thigh as needed for treatment of anaphylaxis, then go to the ER or call 911., Disp: 2 each, Rfl: 2    ergocalciferol (ERGOCALCIFEROL) 1.25 MG (02538 UT) capsule, Take 1 capsule by mouth 1 (One) Time Per Week., Disp: 4 capsule, Rfl: 3    Evolocumab (REPATHA) solution auto-injector SureClick injection, Inject 1 mL under the skin into the appropriate area as directed Every 14 (Fourteen) Days., Disp: 2 mL, Rfl: 5    fluticasone (FLONASE) 50 MCG/ACT nasal spray, Use 1 spray in each nostril Twice a day for 30 days, Disp: 16 g, Rfl: 11    fluticasone (Flovent HFA) 110 MCG/ACT inhaler, Inhale 1 puff by mouth 2 (Two) Times a Day., Disp: 12 g, Rfl: 5    fluticasone (FLOVENT HFA) 44 MCG/ACT inhaler, Inhale 1 puff by mouth twice a day., Disp: 10.6 g, Rfl: 5    glucose blood (OneTouch Ultra) test strip, Use as instructed 2 times daily and as needed, Disp: 100 each, Rfl: 3    HYDROcodone-acetaminophen (NORCO) 5-325 MG per tablet, Take 1 tablet by mouth Every 8 (Eight) Hours for 10 days  (max daily dose of 3 tablets)., Disp: 30 tablet, Rfl: 0    HYDROcodone-acetaminophen (Norco) 7.5-325 MG per tablet, Take 1 tablet by mouth every 6 (six) hours as needed for pain for up to 10 days. Max Daily Amount: 4 tablets, Disp: 25 tablet, Rfl: 0    ibuprofen (ADVIL,MOTRIN) 800 MG tablet, Take 1 tablet by mouth 2 (Two) Times a Day As Needed., Disp: 40 tablet, Rfl: 0    ibuprofen (ADVIL,MOTRIN) 800 MG tablet, Take 1 tablet by mouth Every 6 (Six) Hours As Needed., Disp: 40 tablet, Rfl: 1    ipratropium-albuterol (Combivent Respimat)  MCG/ACT inhaler, INHALE 1 PUFF BY MOUTH 4 (FOUR) TIMES A DAY AS NEEDED FOR  WHEEZING., Disp: 4 g, Rfl: 5    ipratropium-albuterol (DUO-NEB) 0.5-2.5 mg/3 ml nebulizer, Inhale the contents of 3 mL vial nebulizer every 6 hours as needed., Disp: 360 mL, Rfl: 3    Lancets (OneTouch Delica Plus [id number]) misc, Use as instructed 2 times daily and as needed, Disp: 100 each, Rfl: 3    levocetirizine (XYZAL) 5 MG tablet, Take 1 tablet by mouth in the evening Once a day 30 days, Disp: 30 tablet, Rfl: 11    lisinopril (PRINIVIL,ZESTRIL) 30 MG tablet, Take 1 tablet by mouth Daily for blood pressure., Disp: 90 tablet, Rfl: 1    loratadine (CLARITIN) 10 MG tablet, Take 1 tablet by mouth Daily As Needed for Allergies., Disp: 30 tablet, Rfl: 5    methocarbamol (ROBAXIN) 750 MG tablet, Take 1 tablet by mouth 3 (Three) Times a Day., Disp: 90 tablet, Rfl: 0    mirtazapine (REMERON) 30 MG tablet, Take 1 & 1/2  tablets by mouth Every Night., Disp: 45 tablet, Rfl: 5    montelukast (SINGULAIR) 10 MG tablet, Take 1 tablet by mouth., Disp: , Rfl:     multivitamin with minerals tablet tablet, Take 1 tablet by mouth Daily., Disp: 30 tablet, Rfl: 12    ondansetron (Zofran) 4 MG tablet, Take 1 tablet by mouth Every 8 (Eight) Hours As Needed for Nausea., Disp: 20 tablet, Rfl: 0    rosuvastatin (Crestor) 40 MG tablet, Take 1 tablet by mouth Daily., Disp: 30 tablet, Rfl: 3    sodium chloride 0.65 % nasal spray, Use 1-3 sprays in each nostril Three times a day as needed 30 days, Disp: 44 mL, Rfl: 11    tamsulosin (FLOMAX) 0.4 MG capsule 24 hr capsule, Take 1 capsule by mouth Daily., Disp: 90 capsule, Rfl: 3    temazepam (RESTORIL) 30 MG capsule, Take 1 capsule by mouth At Night As Needed for Sleep., Disp: 30 capsule, Rfl: 0    vitamin C (ASCORBIC ACID) 500 MG tablet, Take 1 tablet by mouth 2 (Two) Times a Day., Disp: 60 tablet, Rfl: 1    Medicines reviewed by Melinda Case, PharmD on 4/8/2025 at  1:35 PM    Drug Interactions  None with repatha    Adverse Drug Reactions  Medication tolerability: Tolerating with no to  minimal ADRs  Medication plan: Continue therapy with normal follow-up  Plan for ADR Management: n/a    Adherence, Self-Administration, and Current Therapy Problems  Adherence related to the patient's specialty therapy was discussed with the patient. The Adherence segment of this outreach has been reviewed and updated.     Adherence Questions  Linked Medication(s) Assessed: Evolocumab (REPATHA)  On average, how many doses/injections does the patient miss per month?: 0  What are the identified reasons for non-adherence or missed doses? : no problems identified  What is the estimated medication adherence level?: %  Based on the patient/caregiver response and refill history, does this patient require an MTP to track adherence improvements?: no    Additional Barriers to Patient Self-Administration: patient refuses to give injections himself  Methods for Supporting Patient Self-Administration: patient comes to clinic to receive injections every 2 weeks    Open Medication Therapy Problems  No medication therapy recommendations to display    Goals of Therapy  Goals related to the patient's specialty therapy were discussed with the patient. The Patient Goals segment of this outreach has been reviewed and updated.   Goals Addressed Today        Specialty Pharmacy General Goal      LDL < 70 mg/dl    4/8/25 [place]: LDL reduced from 132 to 64 mg/dl from lipid panel 1/31/25 (at goal)              Quality of Life Assessment   Quality of Life related to the patient's enrollment in the patient management program and services provided was discussed with the patient. The QOL segment of this outreach has been reviewed and updated.  Quality of Life Improvement Scale: 10-Significantly better    Reassessment Plan & Follow-Up  1. Medication Therapy Changes: none reported  2. Related Plans, Therapy Recommendations, or Issues to Be Addressed:   LDL at goal  I administered the injection into the back of the patient's LEFT arm today  Next  appointment will have to be at 3 weeks instead of 2 weeks due to colonoscopy scheduled for 4/24/25. Patient has other appointments scheduled for 4/22/25 and will be completing bowel prep on 4/23/25 and cannot leave home that day  6 months of refills sent to [facility]  3. Pharmacist to perform regular assessments no more than (6) months from the previous assessment.  Patient will continue regular follow-up with referring provider.   4. Care Coordinator to set up future refill outreaches, coordinate prescription delivery, and escalate clinical questions to pharmacist.    Attestation  Therapeutic appropriateness: Appropriate   I attest the patient was actively involved in and has agreed to the above plan of care.  If the prescribed therapy is at any point deemed not appropriate based on the current or future assessments, a consultation will be initiated with the patient's specialty care provider to determine the best course of action. The revised plan of therapy will be documented along with any required assessments and/or additional patient education provided.     Melinda Case, PharmD  4/8/2025  13:42 EDT

## 2025-04-09 ENCOUNTER — OFFICE VISIT (OUTPATIENT)
Dept: FAMILY MEDICINE CLINIC | Facility: CLINIC | Age: 55
End: 2025-04-09
Payer: COMMERCIAL

## 2025-04-09 VITALS
BODY MASS INDEX: 38.3 KG/M2 | RESPIRATION RATE: 16 BRPM | SYSTOLIC BLOOD PRESSURE: 120 MMHG | HEART RATE: 83 BPM | HEIGHT: 67 IN | OXYGEN SATURATION: 99 % | DIASTOLIC BLOOD PRESSURE: 78 MMHG | WEIGHT: 244 LBS

## 2025-04-09 DIAGNOSIS — F51.04 PSYCHOPHYSIOLOGICAL INSOMNIA: ICD-10-CM

## 2025-04-09 DIAGNOSIS — I10 ESSENTIAL HYPERTENSION: ICD-10-CM

## 2025-04-09 DIAGNOSIS — J32.1 CHRONIC FRONTAL SINUSITIS: ICD-10-CM

## 2025-04-09 DIAGNOSIS — K21.9 GASTROESOPHAGEAL REFLUX DISEASE WITHOUT ESOPHAGITIS: ICD-10-CM

## 2025-04-09 DIAGNOSIS — J44.9 CHRONIC OBSTRUCTIVE PULMONARY DISEASE, UNSPECIFIED COPD TYPE: ICD-10-CM

## 2025-04-09 DIAGNOSIS — G56.03 BILATERAL CARPAL TUNNEL SYNDROME: Primary | ICD-10-CM

## 2025-04-09 DIAGNOSIS — J30.1 SEASONAL ALLERGIC RHINITIS DUE TO POLLEN: ICD-10-CM

## 2025-04-09 PROBLEM — K21.00 GASTRO-ESOPHAGEAL REFLUX DISEASE WITH ESOPHAGITIS: Status: ACTIVE | Noted: 2025-04-01

## 2025-04-09 PROBLEM — R11.0 NAUSEA: Status: ACTIVE | Noted: 2025-04-01

## 2025-04-09 PROCEDURE — 1160F RVW MEDS BY RX/DR IN RCRD: CPT | Performed by: NURSE PRACTITIONER

## 2025-04-09 PROCEDURE — 1159F MED LIST DOCD IN RCRD: CPT | Performed by: NURSE PRACTITIONER

## 2025-04-09 PROCEDURE — 3078F DIAST BP <80 MM HG: CPT | Performed by: NURSE PRACTITIONER

## 2025-04-09 PROCEDURE — 3074F SYST BP LT 130 MM HG: CPT | Performed by: NURSE PRACTITIONER

## 2025-04-09 PROCEDURE — 1125F AMNT PAIN NOTED PAIN PRSNT: CPT | Performed by: NURSE PRACTITIONER

## 2025-04-09 PROCEDURE — 99214 OFFICE O/P EST MOD 30 MIN: CPT | Performed by: NURSE PRACTITIONER

## 2025-04-09 RX ORDER — TEMAZEPAM 30 MG/1
30 CAPSULE ORAL NIGHTLY PRN
Qty: 30 CAPSULE | Refills: 1 | Status: SHIPPED | OUTPATIENT
Start: 2025-04-09

## 2025-04-09 RX ORDER — AZELASTINE 1 MG/ML
1 SPRAY, METERED NASAL 2 TIMES DAILY
Qty: 30 ML | Refills: 5 | Status: SHIPPED | OUTPATIENT
Start: 2025-04-09

## 2025-04-09 RX ORDER — FLUTICASONE PROPIONATE 44 UG/1
AEROSOL, METERED RESPIRATORY (INHALATION)
Qty: 10.6 G | Refills: 5 | Status: SHIPPED | OUTPATIENT
Start: 2025-04-09

## 2025-04-09 NOTE — PROGRESS NOTES
"Subjective   Jaquan Mckay is a 54 y.o. male.     Chief Complaint   Patient presents with    Hypertension       History of Present Illness     HTN-ongoing.  Under care of cardiology.  On Lisinopril 30 mg.  He does have occasional chest pain but none since his last visit.  Foot problem-continues to be under the care of podiatry.  He is wearing hi-top shoes for support.  He has his brace on for support.  He has a bruise from hitting his foot.  He reports that his bulb had blown and he was moving around in the dark.  He is about 50% progression on his PT.  He has about 6 weeks of PT left.  He will see Dr Florez on 04/22/25.  He has been trying to walk   GERD-ongoing.  Patient currently on Dexilant 60 mg.  Occasional constipation alternating with diarrhea.  He has been seen by GI recently and will be having a EGD and Colon scope at [facility] in [place].  He reports that he advised the provider he has had increasing burning and reflux.    Insomnia-ongoing.  Patient added low-dose Elavil to his mirtazapine 45 mg.  He also continues Restoril 30 mg.  He reports that he took Elavil for about \"2 weeks\" and then got a rash.    EMG follow up-noted to have Bilateral CTS in both the motor and sensory fibers of the right hand and moderate to severe changes.  The left was noted to have only sensory fiber changes and mild syndrome.    Oxygen use-has been using at night.  He does need updated supplies.   He feels he is sleeping better with use of O2 and he is not waking up gasping for air.  He reports that he does need a extension on his hose as when he rolls over his N/C comes off.   He is down to 2 pillows instead of 3-4 to help with his breathing.  He is using his breathing treatments.  He continues flovent. But he does need a refill.   Seizure-reports he had a small seizure on Sunday.  He has not missed any meds but had not been sleeping well.    PND-and some dizziness.  He has noted some increased cough.   The local pollen " "count is elevated.  He continues his xyzal and singulair.   He is getting an immunotherapy injection every 3 weeks.  He is not having any skin reactions.      The following portions of the patient's history were reviewed and updated as appropriate: CC, ROS, allergies, current medications, past family history, past medical history, past social history, past surgical history and problem list.    Review of Systems   Constitutional:  Positive for fatigue. Negative for appetite change, unexpected weight gain and unexpected weight loss.   HENT:  Negative for congestion, ear pain, postnasal drip, rhinorrhea, sore throat, swollen glands, trouble swallowing and voice change.    Eyes:  Negative for pain and visual disturbance.   Respiratory:  Negative for cough, chest tightness, shortness of breath and wheezing.    Cardiovascular:  Negative for chest pain, palpitations and leg swelling.   Gastrointestinal:  Negative for abdominal pain, blood in stool, constipation, diarrhea, nausea, vomiting and indigestion.   Genitourinary:  Negative for dysuria, hematuria and urgency.   Musculoskeletal:  Positive for arthralgias, back pain, gait problem and myalgias. Negative for joint swelling.   Skin:  Negative for color change and skin lesions.   Allergic/Immunologic: Negative.    Neurological:  Positive for numbness (hands and feet bilaterally). Negative for dizziness and headache.   Hematological: Negative.    Psychiatric/Behavioral:  Positive for stress. Negative for dysphoric mood, sleep disturbance and suicidal ideas. The patient is not nervous/anxious.    All other systems reviewed and are negative.      Objective     /78   Pulse 83   Resp 16   Ht 170.2 cm (67\")   Wt 111 kg (244 lb)   SpO2 99%   BMI 38.22 kg/m²     Physical Exam  Vitals reviewed.   Constitutional:       General: He is not in acute distress.     Appearance: He is well-developed. He is obese. He is not diaphoretic.   HENT:      Head: Normocephalic and " atraumatic.      Jaw: No tenderness.      Right Ear: Hearing, tympanic membrane, ear canal and external ear normal. No middle ear effusion.      Left Ear: Hearing, tympanic membrane, ear canal and external ear normal.  No middle ear effusion.      Nose: No nasal tenderness or congestion.      Right Sinus: Frontal sinus tenderness present. No maxillary sinus tenderness.      Left Sinus: Frontal sinus tenderness present. No maxillary sinus tenderness.      Mouth/Throat:      Lips: Pink.      Mouth: Mucous membranes are moist.      Pharynx: Oropharynx is clear. Uvula midline. Postnasal drip (clear) present.   Eyes:      General: Lids are normal. No scleral icterus.     Extraocular Movements:      Right eye: Normal extraocular motion and no nystagmus.      Left eye: Normal extraocular motion and no nystagmus.      Conjunctiva/sclera: Conjunctivae normal.      Pupils: Pupils are equal, round, and reactive to light.   Neck:      Thyroid: No thyromegaly or thyroid tenderness.      Vascular: No carotid bruit or JVD.      Trachea: No tracheal tenderness.   Cardiovascular:      Rate and Rhythm: Normal rate and regular rhythm.      Pulses:           Dorsalis pedis pulses are 2+ on the right side and 2+ on the left side.        Posterior tibial pulses are 2+ on the right side and 2+ on the left side.      Heart sounds: Normal heart sounds, S1 normal and S2 normal. No murmur heard.  Pulmonary:      Effort: Pulmonary effort is normal. No accessory muscle usage, prolonged expiration or respiratory distress.      Breath sounds: Normal breath sounds.   Chest:      Chest wall: No tenderness.   Abdominal:      General: Bowel sounds are normal. There is no distension.      Palpations: Abdomen is soft. There is no hepatomegaly, splenomegaly or mass.      Tenderness: There is no abdominal tenderness.   Musculoskeletal:         General: Tenderness present.      Cervical back: Normal range of motion and neck supple.      Lumbar back:  Tenderness present.      Right lower leg: No edema.      Left lower leg: No edema.      Right ankle: Tenderness present. Decreased range of motion.      Left ankle: Tenderness present. Decreased range of motion.      Right foot: Decreased range of motion. Normal capillary refill. Swelling and tenderness present.      Left foot: Decreased range of motion. Normal capillary refill. Tenderness present.      Comments: No muscular atrophy or flaccidity.  Left and right foot with brace for support     Lymphadenopathy:      Head:      Right side of head: No submental or submandibular adenopathy.      Left side of head: No submental or submandibular adenopathy.      Cervical: No cervical adenopathy.      Right cervical: No superficial cervical adenopathy.     Left cervical: No superficial cervical adenopathy.   Skin:     General: Skin is warm and dry.      Capillary Refill: Capillary refill takes less than 2 seconds.      Coloration: Skin is not jaundiced or pale.      Findings: No erythema.      Nails: There is no clubbing.   Neurological:      Mental Status: He is alert and oriented to person, place, and time.      Cranial Nerves: No cranial nerve deficit or facial asymmetry.      Sensory: No sensory deficit.      Motor: No weakness, tremor, atrophy or abnormal muscle tone.      Coordination: Coordination normal.      Gait: Gait abnormal (moderate antalgia).      Deep Tendon Reflexes: Reflexes are normal and symmetric.   Psychiatric:         Attention and Perception: He is attentive.         Mood and Affect: Mood normal. Mood is not anxious or depressed.         Speech: Speech normal.         Behavior: Behavior normal. Behavior is cooperative.         Thought Content: Thought content normal.         Cognition and Memory: Cognition normal.         Judgment: Judgment normal.         Diagnoses and all orders for this visit:    1. Bilateral carpal tunnel syndrome (Primary)  Comments:  continue to wear braces  Orders:  -      Ambulatory Referral to Orthopedic Surgery    2. Psychophysiological insomnia  Comments:  continue remeron and restoril  Overview:  With depression and anxiety      Assessment & Plan:  Continue Remeron.  Continue restoril   Continue to work on sleep Hygiene.    Orders:  -     temazepam (RESTORIL) 30 MG capsule; Take 1 capsule by mouth At Night As Needed for Sleep.  Dispense: 30 capsule; Refill: 1    3. Chronic frontal sinusitis  Comments:  We will see if patient is able to resume immunotherapy  Continue montelukast 10 mg nasal sprays, and Claritin 10 mg  Orders:  -     azelastine (ASTELIN) 0.1 % nasal spray; Administer 1 spray into the nostril(s) as directed by provider 2 (Two) Times a Day.  Dispense: 30 mL; Refill: 5    4. Seasonal allergic rhinitis due to pollen  Assessment & Plan:  Continue under the care of allergy specialist.  Continue nasal spray And Xyzal as directed.  Continue Singulair.    Orders:  -     azelastine (ASTELIN) 0.1 % nasal spray; Administer 1 spray into the nostril(s) as directed by provider 2 (Two) Times a Day.  Dispense: 30 mL; Refill: 5    5. Gastroesophageal reflux disease without esophagitis  Assessment & Plan:  Continue Dexilant 60 mg  Advised to avoid known GI triggers such as spicy foods.  Upright 30 minutes after meals and avoid eating large meals.  Several small meals daily as able to avoid overfilling stomach.        6. Essential hypertension  Assessment & Plan:  Continue lisinopril 30 mg.  Continue under the care of cardiology.  Ambulatory BP monitoring either at home or random community checks.  Patient to report continued elevations >140/90.  Patient may come by office for checks if needed.         7. Chronic obstructive pulmonary disease, unspecified COPD type  Comments:  with hypoxia.  continue oxygen.  will request supplies from his DME provider    Other orders  -     fluticasone (FLOVENT HFA) 44 MCG/ACT inhaler; Inhale 1 puff by mouth twice a day.  Dispense: 10.6 g; Refill:  5      Understands disease processes and need for medications.  Understands reasons for urgent and emergent care.  Patient (& family) verbalized agreement for treatment plan.   Emotional support and active listening provided.  Patient provided time to verbalize feelings.    CHAVEZ/LINN reviewed today and consistent.  Will refill prescribed controlled medication today.  Patient is aware they cannot receive narcotics from any other provider except if under care of pain management or speciality clinic.  Risk and benefits of medication use has been reviewed.  History and physical exam exhibit continued safe and appropriate use of controlled substances.  The patient is aware of the potential for addiction and dependence.  This patient has been made aware of the appropriate use of such medications, including potential risk of somnolence, limited ability to drive and / or work safely, and potential for overdose.    It has also been made clear that these medications are for use by this patient only, without concomitant use of alcohol or other substances unless prescribed/advised by medical provider.  Patient understands they may be subject to UDS and pill counts at random.    Patient considered to be moderate risk for addiction due to use of multiple controlled medications.  Patient understands and accepts these risks.  Patient need for medication will be reassessed at each visit.  Doses will be adjusted according to patient need and findings.    Goal of TX: Patient will not have any adverse reactions of medication.    Patient will have improvement in sleep hygiene/pattern with use of Restoril as needed as directed.    CHAVEZ Patient Controlled Substance Report (from 4/9/2024 to 4/9/2025)    Dispensed  Strength Quantity Days Supply Provider Pharmacy   03/12/2025 Temazepam 30MG 30 each 30 [facility]   02/03/2025 Temazepam 30MG 30 each 30 [facility]   01/23/2025  Hydrocod[facility]Ac[facility]   01/23/2025 Temazepam 15MG 30 each 30 [facility]...   12/27/2024 Hydrocodone Bitartrate/Ac [facility]MG/10MG [facility]        RTC 1 month, sooner if needed.             This document has been electronically signed by:  BALDOMERO Thao FNP-C Dragon disclaimer:  Part of this note may be an electronic transcription/translation of spoken language to printed text using the Dragon Dictation System.

## 2025-04-10 ENCOUNTER — HOSPITAL ENCOUNTER (OUTPATIENT)
Dept: PHYSICAL THERAPY | Facility: HOSPITAL | Age: 55
Setting detail: THERAPIES SERIES
Discharge: HOME | End: 2025-04-10
Payer: COMMERCIAL

## 2025-04-10 DIAGNOSIS — G89.29 CHRONIC PAIN OF RIGHT ANKLE: Primary | ICD-10-CM

## 2025-04-10 DIAGNOSIS — M25.571 CHRONIC PAIN OF RIGHT ANKLE: Primary | ICD-10-CM

## 2025-04-10 DIAGNOSIS — R26.89 ANTALGIC GAIT: ICD-10-CM

## 2025-04-10 DIAGNOSIS — Z98.890 HISTORY OF ANKLE SURGERY: ICD-10-CM

## 2025-04-10 PROCEDURE — 97140 MANUAL THERAPY 1/> REGIONS: CPT | Performed by: PHYSICAL THERAPIST

## 2025-04-10 PROCEDURE — 97110 THERAPEUTIC EXERCISES: CPT | Performed by: PHYSICAL THERAPIST

## 2025-04-10 RX ORDER — AZITHROMYCIN 250 MG/1
TABLET, FILM COATED ORAL
Qty: 6 TABLET | Refills: 0 | Status: SHIPPED | OUTPATIENT
Start: 2025-04-10

## 2025-04-10 NOTE — THERAPY TREATMENT NOTE
Physical Therapy Daily Treatment Note      Patient: Jaquan Mckay   : 1970  Referring practitioner: Khurram Florez DPM  Date of Initial Visit: Type: THERAPY  Episode: S/P Right Brostrom/achilles repair  Today's Date: 4/10/2025  Patient seen for 15 sessions       Visit Diagnoses:    ICD-10-CM ICD-9-CM   1. Chronic pain of right ankle  M25.571 719.47    G89.29 338.29   2. History of ankle surgery  Z98.890 V45.89   3. Antalgic gait  R26.89 781.2       Subjective Evaluation    History of Present Illness    Subjective comment: Pt reports 7/10 right ankle pain prior to today's session. He states last night while walking to the restroom in the dark he hit the side of the door with the outside of his foot, resulting in sorness along the incision.Pain  Current pain ratin           Objective   See Exercise, Manual, and Modality Logs for complete treatment.       Assessment & Plan       Assessment  Assessment details: Upon assessment, minimal swelling was present along the posterior aspect of the incision, with tenderness reported. Therapeutic exercises were performed for improved right ankle ROM, strength, and stability. Exercises were performed in the seated, supine, and standing positions. Tactile and verbal cues were provided for proper form. Manual therapy was performed by Win Rodriguez PT, MSPT at the conclusion of today's session. The patient reported 5/10 right ankle pain following today's session.    Plan  Plan details: Progress as indicated to achieve max function.          Timed:         Manual Therapy:    12     mins  15319;     Therapeutic Exercise:    30     mins  00831;     Neuromuscular Jazmin:        mins  49640;    Therapeutic Activity:          mins  74381;     Gait Training:           mins  98157;     Ultrasound:          mins  53064;    Ionto                                   mins   94143  Self Care                            mins   09936  [facility]         mins  61178      Un-Timed:  Electrical Stimulation:         mins  82651 ( );  Dry Needling          mins self-pay  Traction          mins 25201      Timed Treatment:   42   mins   Total Treatment:     47   mins (5 minutes moist heat)    Ashley Claudene Dalton, PT  [place] License: [id number]      Electronically signed by Ashley Claudene Dalton, PT, 04/10/25, 11:15 AM EDT

## 2025-04-16 RX ORDER — DEXTROMETHORPHAN HYDROBROMIDE AND PROMETHAZINE HYDROCHLORIDE 15; 6.25 MG/5ML; MG/5ML
5 SYRUP ORAL 4 TIMES DAILY PRN
Qty: 180 ML | Refills: 0 | Status: SHIPPED | OUTPATIENT
Start: 2025-04-16

## 2025-04-17 ENCOUNTER — HOSPITAL ENCOUNTER (OUTPATIENT)
Dept: PHYSICAL THERAPY | Facility: HOSPITAL | Age: 55
Setting detail: THERAPIES SERIES
Discharge: HOME | End: 2025-04-17
Payer: COMMERCIAL

## 2025-04-17 DIAGNOSIS — Z98.890 HISTORY OF ANKLE SURGERY: ICD-10-CM

## 2025-04-17 DIAGNOSIS — M25.571 CHRONIC PAIN OF RIGHT ANKLE: Primary | ICD-10-CM

## 2025-04-17 DIAGNOSIS — R26.9 IMPAIRED GAIT: ICD-10-CM

## 2025-04-17 DIAGNOSIS — G89.29 CHRONIC PAIN OF RIGHT ANKLE: Primary | ICD-10-CM

## 2025-04-17 DIAGNOSIS — R26.89 ANTALGIC GAIT: ICD-10-CM

## 2025-04-17 PROCEDURE — 97140 MANUAL THERAPY 1/> REGIONS: CPT | Performed by: PHYSICAL THERAPIST

## 2025-04-17 PROCEDURE — 97112 NEUROMUSCULAR REEDUCATION: CPT | Performed by: PHYSICAL THERAPIST

## 2025-04-17 PROCEDURE — 97110 THERAPEUTIC EXERCISES: CPT | Performed by: PHYSICAL THERAPIST

## 2025-04-17 NOTE — THERAPY EVALUATION
Physical Therapy Re Certification Of Plan of Care  Patient: Jaquan Mckay   : 1970  Diagnosis/ICD-10 Code:    Referring practitioner: Khurram Florez DPM  Date of Initial Visit: Type: THERAPY  Episode: S/P Right Brostrom/achilles repair  Today's Date: 2025  Patient seen for 16 sessions         Visit Diagnoses:    ICD-10-CM ICD-9-CM   1. Chronic pain of right ankle  M25.571 719.47    G89.29 338.29   2. History of ankle surgery  Z98.890 V45.89   3. Antalgic gait  R26.89 781.2   4. Impaired gait  R26.9 781.2         Jaquan Mckay reports:   Subjective Questionnaire: LEFS: 69%  Clinical Progress: improved  Home Program Compliance: Yes  Treatment has included: therapeutic exercise, neuromuscular re-education, manual therapy, therapeutic activity, gait training, ultrasound, iontophoresis, moist heat, and cryotherapy      Subjective Evaluation    History of Present Illness    Subjective comment: Pt will see his MD next week.  Pt has four treatment sessions and is motivated to continue treatment for max gains.Pain  Current pain ratin  At best pain ratin  At worst pain ratin  Location: right ankle             Objective          Active Range of Motion     Right Ankle/Foot   Dorsiflexion (ke): 2 degrees   Plantar flexion: 65 degrees     Strength/Myotome Testing     Right Ankle/Foot   Dorsiflexion: 4+  Plantar flexion: 4+          Assessment & Plan       Assessment  Impairments: abnormal coordination, abnormal gait, abnormal muscle firing, abnormal muscle tone, abnormal or restricted ROM, activity intolerance, impaired balance, impaired physical strength, lacks appropriate home exercise program, pain with function and weight-bearing intolerance   Functional limitations: lifting, walking, uncomfortable because of pain, standing and unable to perform repetitive tasks   Assessment details: Pt is a 53 y/o male referred to therapy for treatment following a right Brostrom repair and achilles repair.  Pt has  attended 16 sessions with improved gait, leg stability and ROM.  Pt has noted improved functional mobility; yet cont to report pain as great as 9/10.  Prognosis: good    Goals  Plan Goals: STG 6 weeks    1 Pt will be instructed in a HEP.met  2 Pt will improve his LEFs to less than 50%not met  3 Pt will demonstrate 5 degrees from neutral DF.met    LTG 12 weeks    1 Pt will demonstrate 4/5 gross right ankle strength.met  2 Pt will improve his LEFs to less than 25%.not met  3 Pt will amb with improved stance on right LE with no AD.met  4 Pt will improve his right ankle ROM from 0 DF to 65 degree PF.partially met    Plan  Therapy options: will be seen for skilled therapy services  Planned modality interventions: cryotherapy, TENS, thermotherapy (hydrocollator packs) and ultrasound  Planned therapy interventions: ADL retraining, balance/weight-bearing training, body mechanics training, flexibility, functional ROM exercises, gait training, home exercise program, IADL retraining, joint mobilization, manual therapy, motor coordination training, neuromuscular re-education, postural training, soft tissue mobilization, spinal/joint mobilization, strengthening, stretching and therapeutic activities  Frequency: 1x week  Duration in weeks: 4  Treatment plan discussed with: patient  Plan details: Will follow for optimal gains.  Moderate Evaluation  19862  Re-evaluation   15914  Therapeutic exercise  69638  Therapeutic activity    35984  Neuromuscular re-education   87494  Manual therapy   67185  Gait training  50441    Unattended e-stim (Medicaid/Medicare)     Moist heat/cryotherapy 11485   Ultrasound   23694               Recommendations: Continue as planned  Timeframe: 1 month  Prognosis to achieve goals: good      Timed:         Manual Therapy:    12     mins  23170;     Therapeutic Exercise:    16     mins  10253;     Neuromuscular Jazmin:    14    mins  99463;    Therapeutic Activity:          mins  05390;     Gait  Training:           mins  82395;     Ultrasound:          mins  41372;    Ionto                                   mins   08258  Self Care                            mins   00009    Un-Timed:  Electrical Stimulation:         mins  49650 ( );  Dry Needling          mins self-pay  Traction          mins 03061  Re-Eval                               mins  01058  Canalith Repos         mins 48710    Timed Treatment:   42   mins   Total Treatment:     54   mins(6min mh and 6 min ice)          PT: Win Rodriguez PT     [place] License:  [id number]    Electronically signed by Win Rodriguez PT, 04/17/25, 10:07 AM EDT    Certification Period: 4/17/2025 thru 7/15/2025  I certify that the therapy services are furnished while this patient is under my care.  The services outlined above are required by this patient, and will be reviewed every 90 days.         Physician Signature:__________________________________________________    PHYSICIAN: Khurram Florez DPM  NPI: [id number]                                      DATE:  :     Please sign and return via fax to [email or web address] . Thank you, [facility]

## 2025-04-22 ENCOUNTER — SPECIALTY PHARMACY (OUTPATIENT)
Dept: PHARMACY | Facility: HOSPITAL | Age: 55
End: 2025-04-22
Payer: COMMERCIAL

## 2025-04-22 RX ORDER — MUPIROCIN 20 MG/G
1 OINTMENT TOPICAL 3 TIMES DAILY
Qty: 30 G | Refills: 0 | Status: SHIPPED | OUTPATIENT
Start: 2025-04-22

## 2025-04-22 NOTE — PROGRESS NOTES
Specialty Pharmacy Patient Management Program  Medication Management Clinic Refill Outreach      Jaquan was contacted today regarding refills of his medication(s).    Specialty medication(s) and dose(s) confirmed: repatha sureclick    Refill Questions      Flowsheet Row Most Recent Value   Changes to allergies? No   Changes to medications? No   New conditions or infections since last clinic visit No   Unplanned office visit, urgent care, ED, or hospital admission in the last 4 weeks  No   How does patient/caregiver feel medication is working? Good   Financial problems or insurance changes  No   Since the previous refill, were any specialty medication doses or scheduled injections missed or delayed?  No   Does this patient require a clinical escalation to a pharmacist? No          Delivery Questions      Flowsheet Row Most Recent Value   Delivery method  at Pharmacy  [administered in clinic]   Delivery address verified with patient/caregiver? No  [administered in clinic]   Delivery address Other (Enter below)   Other address preferred administered in clinic   Number of medications in delivery --  [administered in clinic]   Medication(s) being filled and delivered Evolocumab (REPATHA)   Doses left of specialty medications 0   Copay verified? Yes   Copay amount $0.00   Copay form of payment No copayment ($0)   Delivery Date Selection --  [administered in clinic]   Signature Required No   Do you consent to receive electronic handouts?  No            Follow-Up: 14 days    Melinda Case, PharmD  4/22/2025  11:23 EDT

## 2025-04-24 ENCOUNTER — APPOINTMENT (OUTPATIENT)
Dept: PHYSICAL THERAPY | Facility: HOSPITAL | Age: 55
End: 2025-04-24
Payer: COMMERCIAL

## 2025-04-30 ENCOUNTER — SPECIALTY PHARMACY (OUTPATIENT)
Dept: PHARMACY | Facility: HOSPITAL | Age: 55
End: 2025-04-30
Payer: COMMERCIAL

## 2025-04-30 NOTE — PROGRESS NOTES
Medication Management Clinic/ Specialty Pharmacy Patient Management Program  Lipid Management Program - PCSK9i follow up Assessment     Jaquan Mckay is a 54 y.o. male referred by their provider, Tiera Valenzuela, to the Hyperlipidemia Patient Management program offered by [facility] for Lipid Management.  Jaquan Mckay is  treated for ASCVD and hyperlipidemia, and currently takes crestor 40 mg.  In the past, Pt has tried lipitor 40 mg, zocor 10 mg, pravastatin 80 mg and is not statin intolerant. The patient denies any allergies to latex.       A follow-up outreach was conducted, including assessment of continued therapy appropriateness, medication adherence, and side effect incidence and management for Repatha. Patient comes to clinic for injections and has not missed any doses.  Repatha was started on 10/29/24, and patient reports tolerating the medication well with no side effects.    He reports he tolerated last Repatha injection without any issues. He denies any side effects. Patient does is delayed for this dose due to patient having a colonoscopy last week (week injection was due) and patient being unable to come in for those reasons. Patient reports overall tolerating colonoscopy well.  Pt is pleased with results of LDL since starting Repatha.     Changes to Insurance Coverage or Financial Support  [employer] [place] (no change)    Relevant Past Medical History and Comorbidities  Relevant medical history and concomitant health conditions were discussed with the patient. The patient's chart has been reviewed for relevant past medical history and comorbid health conditions and updated as necessary.   Past Medical History:   Diagnosis Date    Allergic     Anxiety     Arthritis     Asthma     Body piercing     REPORTS CYLICONE IN EARS    Clotting disorder 2004    had a knee surgery    Coronary artery disease     Depression     DVT (deep venous  "thrombosis)     RIGHT RIGHT KNEE AFTER SURGERY YEARS AGO IN 2001 OR 2004    Elevated cholesterol     Gastric ulcer     GERD (gastroesophageal reflux disease)     H/O migraine     Headache     Heart attack     REPORTS \"LIGHT HEART ATTACK A LONG TIME AGO\"  \"EARLY 90'S\"    History of seizures     REPORTS LAST EPISODE WAS AROUND 1995.    Hostility     Hyperlipidemia     Hypertension     Knee pain, acute     Left    Low back pain     Lyme disease     Migraine     MRSA (methicillin resistant Staphylococcus aureus)     REPORTS LAST TESTED + 2004. WAS TREATED HE REPORTS.  RIGHT ARM, RIGHT KNEE.    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     Obesity     Poor historian     [place] spotted fever     Seizures     Sleep apnea     Tattoo     Wears glasses      Social History     Socioeconomic History    Marital status:      Spouse name: Becca    Number of children: 2    Years of education: 12   Tobacco Use    Smoking status: Every Day     Current packs/day: 1.00     Average packs/day: 1 pack/day for 18.2 years (18.2 ttl pk-yrs)     Types: Cigars, Cigarettes     Start date: 4/11/2022     Passive exposure: Current    Smokeless tobacco: Never   Vaping Use    Vaping status: Never Used   Substance and Sexual Activity    Alcohol use: No    Drug use: No    Sexual activity: Defer     Partners: Female     Birth control/protection: None          Hospitalizations and Urgent Care Since Last Assessment  ED Visits, Admissions, or Hospitalizations: none  Urgent Office Visits: none    Allergies  Known allergies and reactions were discussed with the patient. The patient's chart has been reviewed for allergy information and updated as necessary.   Allergies   Allergen Reactions    Ciprofloxacin Anaphylaxis and Hives    Miralax [Polyethylene Glycol] Itching and Rash    Mobic [Meloxicam] Other (See Comments)     Pt states, \"It make my feet and hands go numb and I can't hardly walk.\"     Paxil [Paroxetine Hcl] Shortness Of Breath     Chest pain     " Peanut-Containing Drug Products Anaphylaxis    Penicillins Anaphylaxis    Pristiq [Desvenlafaxine Succinate Er] Dizziness    Sulfa Antibiotics Anaphylaxis, Itching and Rash    Doxycycline Hives    Fish-Derived Products Hives    Isosorbide Nitrate Rash     Rash, hives, had to use inhaler.     Lyrica [Pregabalin] Hives    Movantik [Naloxegol] Rash    Seroquel [Quetiapine] Hives and Rash    Trulance [Plecanatide] Hives    Buspar [Buspirone] Rash    Clarithromycin Rash    Clindamycin/Lincomycin Rash    Codeine Rash    Contrast Dye (Echo Or Unknown Ct/Mr) Itching and Rash    Diltiazem Rash    Elavil [Amitriptyline] Rash    Flomax [Tamsulosin] Hives    Gabapentin Rash    Iodinated Contrast Media Itching and Rash    Keflex [Cephalexin] Rash    Levocetirizine Rash    Linzess [Linaclotide] Rash    Metoprolol Rash    Prednisone Rash and Other (See Comments)     Face, feet, and legs go completely numb per patient    Robitussin Cough+ Chest Max St [Dextromethorphan-Guaifenesin] Itching    Shrimp (Diagnostic) Rash    Spironolactone Rash    Viibryd [Vilazodone Hcl] Itching and Rash    Zoloft [Sertraline Hcl] Hives and Itching          Relevant Laboratory Values  Relevant laboratory values were discussed with the patient. The following specialty medication dose adjustment(s) are recommended: none    Lab Results   Component Value Date    GLUCOSE 95 02/12/2025    CALCIUM 9.1 02/12/2025     02/12/2025    K 3.9 02/12/2025    CO2 24.7 02/12/2025     02/12/2025    BUN 15 02/12/2025    CREATININE 0.93 02/12/2025    EGFRIFAFRI  08/26/2016      Comment:      <15 Indicative of kidney failure.    EGFRIFNONA 66 02/02/2022    BCR 16.1 02/12/2025    ANIONGAP 11.3 02/12/2025     Lab Results   Component Value Date    CHOL 144 01/31/2025    CHLPL 232 (H) 04/05/2016    TRIG 87 01/31/2025    HDL 64 (H) 01/31/2025    LDL 64 01/31/2025       Current Medication List  This medication list has been reviewed with the patient and evaluated for  any interactions or necessary modifications/recommendations, and updated to include all prescription medications, OTC medications, and supplements the patient is currently taking.  This list reflects what is contained in the patient's profile, which has also been marked as reviewed to communicate to other providers it is the most up to date version of the patient's current medication therapy.     Current Outpatient Medications:     albuterol sulfate  (90 Base) MCG/ACT inhaler, Inhale 2 puffs by mouth every 4 hours as needed., Disp: 18 g, Rfl: 3    Alcohol Swabs (Alcohol Prep) 70 % pads, Use 1 each 2 (Two) Times a Day., Disp: 60 each, Rfl: 5    aspirin (Aspirin EC Adult Low Dose) 81 MG EC tablet, Take 1 tablet by mouth Daily., Disp: 30 tablet, Rfl: 3    aspirin 325 MG tablet, Take 1 tablet by mouth Daily., Disp: 30 tablet, Rfl: 0    azelastine (ASTELIN) 0.1 % nasal spray, Administer 1 spray into the nostril(s) as directed by provider 2 (Two) Times a Day., Disp: 30 mL, Rfl: 5    azithromycin (Zithromax Z-Clark) 250 MG tablet, Take 2 tablets by mouth on day 1, then 1 tablet daily on days 2-5, Disp: 6 tablet, Rfl: 0    cloNIDine (Catapres) 0.1 MG tablet, Take 1 tablet by mouth 2 (Two) Times a Day As Needed for High Blood Pressure (if bp more than 140/90)., Disp: 60 tablet, Rfl: 1    dexAMETHasone (DECADRON) 4 MG/ML injection, Use 1 mL at Physical Therapy for ionthophoresis., Disp: 40 mL, Rfl: 0    dexlansoprazole (Dexilant) 60 MG capsule, Take 1 capsule by mouth 2 (Two) Times a Day., Disp: 60 capsule, Rfl: 5    Diclofenac Sodium (VOLTAREN) 1 % gel gel, Apply 2 grams topically to the affect area as directed 4 (Four) Times a Day., Disp: 200 g, Rfl: 2    Dilantin 100 MG capsule, Take 2 capsules by mouth 2 (Two) Times a Day., Disp: 120 capsule, Rfl: 5    diphenhydrAMINE (Benadryl Allergy) 25 MG tablet, Take 1-2 tablets by mouth Every 8 (Eight) Hours As Needed for Itching (or rash)., Disp: 120 tablet, Rfl: 2    docusate  calcium (SURFAK) 240 MG capsule, Take 1 capsule by mouth 2 (Two) Times a Day As Needed for Constipation., Disp: 60 capsule, Rfl: 2    Elastic Bandages & Supports (ACE Ankle Brace) misc, 1 each Daily., Disp: 1 each, Rfl: 0    EPINEPHrine (EPIPEN) 0.3 MG/0.3ML solution auto-injector injection, Inject Intramuscularly into lateral thigh as needed for treatment of anaphylaxis, then go to the ER or call 911., Disp: 2 each, Rfl: 2    ergocalciferol (ERGOCALCIFEROL) 1.25 MG (64334 UT) capsule, Take 1 capsule by mouth 1 (One) Time Per Week., Disp: 4 capsule, Rfl: 3    Evolocumab (REPATHA) solution auto-injector SureClick injection, Inject 1 mL under the skin into the appropriate area as directed Every 14 (Fourteen) Days., Disp: 2 mL, Rfl: 5    fluticasone (FLONASE) 50 MCG/ACT nasal spray, Use 1 spray in each nostril Twice a day for 30 days, Disp: 16 g, Rfl: 11    fluticasone (Flovent HFA) 110 MCG/ACT inhaler, Inhale 1 puff by mouth 2 (Two) Times a Day., Disp: 12 g, Rfl: 5    fluticasone (FLOVENT HFA) 44 MCG/ACT inhaler, Inhale 1 puff by mouth twice a day., Disp: 10.6 g, Rfl: 5    glucose blood (OneTouch Ultra) test strip, Use as instructed 2 times daily and as needed, Disp: 100 each, Rfl: 3    HYDROcodone-acetaminophen (NORCO) 5-325 MG per tablet, Take 1 tablet by mouth Every 8 (Eight) Hours for 10 days  (max daily dose of 3 tablets)., Disp: 30 tablet, Rfl: 0    HYDROcodone-acetaminophen (Norco) 7.5-325 MG per tablet, Take 1 tablet by mouth every 6 (six) hours as needed for pain for up to 10 days. Max Daily Amount: 4 tablets, Disp: 25 tablet, Rfl: 0    ibuprofen (ADVIL,MOTRIN) 800 MG tablet, Take 1 tablet by mouth 2 (Two) Times a Day As Needed., Disp: 40 tablet, Rfl: 0    ibuprofen (ADVIL,MOTRIN) 800 MG tablet, Take 1 tablet by mouth Every 6 (Six) Hours As Needed., Disp: 40 tablet, Rfl: 1    ipratropium-albuterol (Combivent Respimat)  MCG/ACT inhaler, INHALE 1 PUFF BY MOUTH 4 (FOUR) TIMES A DAY AS NEEDED FOR WHEEZING.,  Disp: 4 g, Rfl: 5    ipratropium-albuterol (DUO-NEB) 0.5-2.5 mg/3 ml nebulizer, Inhale the contents of 3 mL vial nebulizer every 6 hours as needed., Disp: 360 mL, Rfl: 3    Lancets (OneTouch Delica Plus [id number]) misc, Use as instructed 2 times daily and as needed, Disp: 100 each, Rfl: 3    levocetirizine (XYZAL) 5 MG tablet, Take 1 tablet by mouth in the evening Once a day 30 days, Disp: 30 tablet, Rfl: 11    lisinopril (PRINIVIL,ZESTRIL) 30 MG tablet, Take 1 tablet by mouth Daily for blood pressure., Disp: 90 tablet, Rfl: 1    loratadine (CLARITIN) 10 MG tablet, Take 1 tablet by mouth Daily As Needed for Allergies., Disp: 30 tablet, Rfl: 5    methocarbamol (ROBAXIN) 750 MG tablet, Take 1 tablet by mouth 3 (Three) Times a Day., Disp: 90 tablet, Rfl: 0    mirtazapine (REMERON) 30 MG tablet, Take 1 & 1/2  tablets by mouth Every Night., Disp: 45 tablet, Rfl: 5    montelukast (SINGULAIR) 10 MG tablet, Take 1 tablet by mouth., Disp: , Rfl:     mupirocin (BACTROBAN) 2 % ointment, Apply 1 Application topically to the appropriate area as directed 3 (Three) Times a Day., Disp: 30 g, Rfl: 0    ondansetron (Zofran) 4 MG tablet, Take 1 tablet by mouth Every 8 (Eight) Hours As Needed for Nausea., Disp: 20 tablet, Rfl: 0    promethazine-dextromethorphan (PROMETHAZINE-DM) 6.25-15 MG/5ML syrup, Take 5 mL by mouth 4 (Four) Times a Day As Needed for Cough., Disp: 180 mL, Rfl: 0    rosuvastatin (Crestor) 40 MG tablet, Take 1 tablet by mouth Daily., Disp: 30 tablet, Rfl: 3    sodium chloride 0.65 % nasal spray, Use 1-3 sprays in each nostril Three times a day as needed 30 days, Disp: 44 mL, Rfl: 11    tamsulosin (FLOMAX) 0.4 MG capsule 24 hr capsule, Take 1 capsule by mouth Daily., Disp: 90 capsule, Rfl: 3    temazepam (RESTORIL) 30 MG capsule, Take 1 capsule by mouth At Night As Needed for Sleep., Disp: 30 capsule, Rfl: 1    vitamin C (ASCORBIC ACID) 500 MG tablet, Take 1 tablet by mouth 2 (Two) Times a Day., Disp: 60 tablet, Rfl:  1         Drug Interactions  No significant drug-drug interactions with Repatha according to literature.     Adverse Drug Reactions        Plan for ADR Management: n/a    Adherence, Self-Administration, and Current Therapy Problems  Adherence related to the patient's specialty therapy was discussed with the patient. The Adherence segment of this outreach has been reviewed and updated.          Additional Barriers to Patient Self-Administration: patient refuses to administer himself  Methods for Supporting Patient Self-Administration: patient comes to [facility] clinic every 2 weeks for injections    Open Medication Therapy Problems  No medication therapy recommendations to display    Goals of Therapy  Goals related to the patient's specialty therapy were discussed with the patient. The Patient Goals segment of this outreach has been reviewed and updated.   Goals Addressed Today    None     LDL was 136 mg/dl on 3/20/24 and has improved to 132 mg/ml on 10/17/24. This lab work was completed prior to starting repatha therapy.    Quality of Life Assessment   Quality of Life related to the patient's enrollment in the patient management program and services provided was discussed with the patient. The QOL segment of this outreach has been reviewed and updated.       Reassessment Plan & Follow-Up  1.Medication Therapy Changes: Patient will continue Repatha 140mg every 2 weeks    A. I administered injection in to the back of his RIGHT arm   2. Related Plans, Therapy Recommendations, or Issues to Be Addressed: none  3. Pharmacist to perform regular assessments no more than (6) months from the previous assessment.  Patient will continue regular follow-up with referring provider.   4. Care Coordinator to set up future refill outreaches, coordinate prescription delivery, and escalate clinical questions to pharmacist.      Attestation      I attest the patient was actively involved in and has agreed to the above plan of care.  If the  prescribed therapy is at any point deemed not appropriate based on the current or future assessments, a consultation will be initiated with the patient's specialty care provider to determine the best course of action. The revised plan of therapy will be documented along with any required assessments and/or additional patient education provided.     Patient to follow up for injections in Clinic every 2 weeks    Grecia Langston. Gopal, Lizandro  4/30/2025  15:23 EDT

## 2025-05-01 ENCOUNTER — HOSPITAL ENCOUNTER (OUTPATIENT)
Dept: PHYSICAL THERAPY | Facility: HOSPITAL | Age: 55
Setting detail: THERAPIES SERIES
Discharge: HOME | End: 2025-05-01
Payer: COMMERCIAL

## 2025-05-01 DIAGNOSIS — R26.89 ANTALGIC GAIT: ICD-10-CM

## 2025-05-01 DIAGNOSIS — Z98.890 HISTORY OF ANKLE SURGERY: ICD-10-CM

## 2025-05-01 DIAGNOSIS — R26.9 IMPAIRED GAIT: ICD-10-CM

## 2025-05-01 DIAGNOSIS — G89.29 CHRONIC PAIN OF RIGHT ANKLE: Primary | ICD-10-CM

## 2025-05-01 DIAGNOSIS — M25.571 CHRONIC PAIN OF RIGHT ANKLE: Primary | ICD-10-CM

## 2025-05-01 PROCEDURE — 97110 THERAPEUTIC EXERCISES: CPT | Performed by: PHYSICAL THERAPIST

## 2025-05-01 PROCEDURE — 97530 THERAPEUTIC ACTIVITIES: CPT | Performed by: PHYSICAL THERAPIST

## 2025-05-01 PROCEDURE — 97140 MANUAL THERAPY 1/> REGIONS: CPT | Performed by: PHYSICAL THERAPIST

## 2025-05-01 NOTE — THERAPY EVALUATION
Physical Therapy Daily Treatment Note      Patient: Jaquan Mckay   : 1970  Referring practitioner: Khurram Florez DPM  Date of Initial Visit: Type: THERAPY  Episode: S/P Right Brostrom/achilles repair  Today's Date: 2025  Patient seen for 17 sessions       Visit Diagnoses:    ICD-10-CM ICD-9-CM   1. Chronic pain of right ankle  M25.571 719.47    G89.29 338.29   2. History of ankle surgery  Z98.890 V45.89   3. Antalgic gait  R26.89 781.2   4. Impaired gait  R26.9 781.2       Subjective Evaluation    History of Present Illness    Subjective comment: Pt has 6/10 pain today.       Objective   See Exercise, Manual, and Modality Logs for complete treatment.       Assessment & Plan       Assessment  Assessment details: Tx today consisted of mat exercises and sitting exercises for ankle mobility and stability; followed by functional stepping and proprioceptive training and ended with stm and ice for decreased pain.  Pt cont to demonstrate good effort and pt was noted to have improved balance today.  Pt reported 4/10 post pain.    Plan  Plan details: Will follow progressing ankle stability and decreased pain.          Timed:         Manual Therapy:    12     mins  53208;     Therapeutic Exercise:    31     mins  52658;     Neuromuscular Jazmin:        mins  49780;    Therapeutic Activity:     12     mins  22186;     Gait Training:           mins  74268;     Ultrasound:          mins  63832;    Ionto                                   mins   30169  Self Care                            mins   22665  Canalith Repos         mins 27003      Un-Timed:  Electrical Stimulation:         mins  52200 (MC );  Dry Needling          mins self-pay  Traction          mins 84623      Timed Treatment:   55   mins   Total Treatment:     67   mins(6min mh and 6min ice)    Win Rodriguez PT  [place] License: [id number]      Electronically signed by Win Rodriguez PT, 25, 10:08 AM EDT

## 2025-05-06 ENCOUNTER — APPOINTMENT (OUTPATIENT)
Dept: PHYSICAL THERAPY | Facility: HOSPITAL | Age: 55
End: 2025-05-06
Payer: COMMERCIAL

## 2025-05-07 ENCOUNTER — OFFICE VISIT (OUTPATIENT)
Dept: FAMILY MEDICINE CLINIC | Facility: CLINIC | Age: 55
End: 2025-05-07
Payer: COMMERCIAL

## 2025-05-07 VITALS
OXYGEN SATURATION: 98 % | BODY MASS INDEX: 38.55 KG/M2 | HEIGHT: 67 IN | DIASTOLIC BLOOD PRESSURE: 80 MMHG | SYSTOLIC BLOOD PRESSURE: 120 MMHG | WEIGHT: 245.6 LBS | RESPIRATION RATE: 16 BRPM | HEART RATE: 85 BPM

## 2025-05-07 DIAGNOSIS — E66.812 CLASS 2 SEVERE OBESITY DUE TO EXCESS CALORIES WITH SERIOUS COMORBIDITY AND BODY MASS INDEX (BMI) OF 36.0 TO 36.9 IN ADULT: ICD-10-CM

## 2025-05-07 DIAGNOSIS — I10 ESSENTIAL HYPERTENSION: Primary | ICD-10-CM

## 2025-05-07 DIAGNOSIS — K59.03 DRUG-INDUCED CONSTIPATION: ICD-10-CM

## 2025-05-07 DIAGNOSIS — E66.01 CLASS 2 SEVERE OBESITY DUE TO EXCESS CALORIES WITH SERIOUS COMORBIDITY AND BODY MASS INDEX (BMI) OF 36.0 TO 36.9 IN ADULT: ICD-10-CM

## 2025-05-07 DIAGNOSIS — K21.9 GASTROESOPHAGEAL REFLUX DISEASE WITHOUT ESOPHAGITIS: ICD-10-CM

## 2025-05-07 DIAGNOSIS — R56.9 SEIZURES: ICD-10-CM

## 2025-05-07 DIAGNOSIS — E55.9 VITAMIN D DEFICIENCY: ICD-10-CM

## 2025-05-07 DIAGNOSIS — F51.04 PSYCHOPHYSIOLOGICAL INSOMNIA: ICD-10-CM

## 2025-05-07 DIAGNOSIS — E78.2 MIXED HYPERLIPIDEMIA: ICD-10-CM

## 2025-05-07 PROBLEM — G56.03 BILATERAL CARPAL TUNNEL SYNDROME: Status: ACTIVE | Noted: 2025-04-21

## 2025-05-07 PROCEDURE — 3074F SYST BP LT 130 MM HG: CPT | Performed by: NURSE PRACTITIONER

## 2025-05-07 PROCEDURE — 3079F DIAST BP 80-89 MM HG: CPT | Performed by: NURSE PRACTITIONER

## 2025-05-07 PROCEDURE — 1159F MED LIST DOCD IN RCRD: CPT | Performed by: NURSE PRACTITIONER

## 2025-05-07 PROCEDURE — 1160F RVW MEDS BY RX/DR IN RCRD: CPT | Performed by: NURSE PRACTITIONER

## 2025-05-07 PROCEDURE — 1125F AMNT PAIN NOTED PAIN PRSNT: CPT | Performed by: NURSE PRACTITIONER

## 2025-05-07 PROCEDURE — 99214 OFFICE O/P EST MOD 30 MIN: CPT | Performed by: NURSE PRACTITIONER

## 2025-05-07 RX ORDER — PHENTERMINE HYDROCHLORIDE 37.5 MG/1
37.5 TABLET ORAL
Qty: 30 TABLET | Refills: 0 | Status: SHIPPED | OUTPATIENT
Start: 2025-05-07

## 2025-05-07 RX ORDER — ERGOCALCIFEROL 1.25 MG/1
50000 CAPSULE ORAL WEEKLY
Qty: 4 CAPSULE | Refills: 5 | Status: SHIPPED | OUTPATIENT
Start: 2025-05-07

## 2025-05-07 RX ORDER — ASPIRIN 81 MG/1
81 TABLET ORAL DAILY
Qty: 30 TABLET | Refills: 3 | Status: SHIPPED | OUTPATIENT
Start: 2025-05-07

## 2025-05-07 RX ORDER — TEMAZEPAM 30 MG/1
30 CAPSULE ORAL NIGHTLY PRN
Qty: 30 CAPSULE | Refills: 1 | Status: SHIPPED | OUTPATIENT
Start: 2025-05-07

## 2025-05-07 RX ORDER — MIRTAZAPINE 30 MG/1
45 TABLET, FILM COATED ORAL NIGHTLY
Qty: 45 TABLET | Refills: 5 | Status: SHIPPED | OUTPATIENT
Start: 2025-05-07

## 2025-05-07 RX ORDER — ROSUVASTATIN CALCIUM 40 MG/1
40 TABLET, COATED ORAL DAILY
Qty: 30 TABLET | Refills: 5 | Status: SHIPPED | OUTPATIENT
Start: 2025-05-07

## 2025-05-07 RX ORDER — PHENYTOIN SODIUM 100 MG/1
200 CAPSULE, EXTENDED RELEASE ORAL 2 TIMES DAILY
Qty: 120 CAPSULE | Refills: 5 | Status: SHIPPED | OUTPATIENT
Start: 2025-05-07

## 2025-05-07 RX ORDER — DOCUSATE CALCIUM 240 MG
240 CAPSULE ORAL 2 TIMES DAILY PRN
Qty: 60 CAPSULE | Refills: 2 | Status: SHIPPED | OUTPATIENT
Start: 2025-05-07

## 2025-05-07 RX ORDER — ASCORBIC ACID 500 MG
500 TABLET ORAL 2 TIMES DAILY
Qty: 60 TABLET | Refills: 5 | Status: SHIPPED | OUTPATIENT
Start: 2025-05-07

## 2025-05-07 RX ORDER — DEXLANSOPRAZOLE 60 MG/1
60 CAPSULE, DELAYED RELEASE ORAL 2 TIMES DAILY
Qty: 60 CAPSULE | Refills: 5 | Status: SHIPPED | OUTPATIENT
Start: 2025-05-07

## 2025-05-07 NOTE — PROGRESS NOTES
"Subjective   Jaquan Mckay is a 54 y.o. male.     Chief Complaint   Patient presents with    Bilateral carpal tunnel syndrome       History of Present Illness     CTS-ongoing.  He did see a provider through [facility] for eval.  He is having some trigger finger of his right 2nd digit.  He reports that he has to manually release his finger at times.  He will be having surgery but \"not till September\" since the current surgeon is leaving the practice.    HTN-stable on lisinopril 30 mg.  No recent CP  Right hand problem-has noted a area on top of his hand since his IV.  He reports that the area \"just came up\" and is tender.  It has not been swollen or hot.  He has not used any treatments on the area.    GI-no new GI symptoms.  He continues Dexilant 60 mg.  He will follow up on Char 3.    Foot problem-has seen Dr Florez on 04/22/2025.  He did get a new right foot brace.  He reports that Dr Florez feels it is progressing toward healing.  His new brace has better support along his plantar.  He will follow up Char 3.    Weight problem-has been able to be more mobile and would like to start adipex back for weight loss.      The following portions of the patient's history were reviewed and updated as appropriate: CC, ROS, allergies, current medications, past family history, past medical history, past social history, past surgical history and problem list.      Review of Systems   Constitutional:  Positive for fatigue. Negative for appetite change, unexpected weight gain and unexpected weight loss.   HENT:  Negative for congestion, ear pain, postnasal drip, rhinorrhea, sore throat, swollen glands, trouble swallowing and voice change.    Eyes:  Negative for blurred vision, double vision, pain and visual disturbance.   Respiratory:  Negative for cough, chest tightness, shortness of breath and wheezing.    Cardiovascular:  Negative for chest pain, palpitations and leg swelling.   Gastrointestinal:  Negative for abdominal pain, blood " "in stool, constipation, diarrhea, nausea and indigestion.   Genitourinary:  Negative for dysuria, hematuria and urgency.   Musculoskeletal:  Positive for arthralgias, back pain, gait problem and myalgias. Negative for joint swelling.   Skin:  Negative for color change and skin lesions.   Allergic/Immunologic: Negative.    Neurological:  Positive for numbness. Negative for dizziness and headache.   Hematological: Negative.    Psychiatric/Behavioral:  Positive for sleep disturbance and stress. Negative for dysphoric mood and suicidal ideas. The patient is not nervous/anxious.    All other systems reviewed and are negative.      Objective     /80   Pulse 85   Resp 16   Ht 170.2 cm (67\")   Wt 111 kg (245 lb 9.6 oz)   SpO2 98%   BMI 38.47 kg/m²     Physical Exam  Vitals reviewed.   Constitutional:       General: He is not in acute distress.     Appearance: He is well-developed. He is obese. He is not diaphoretic.   HENT:      Head: Normocephalic and atraumatic.      Jaw: No tenderness.      Right Ear: Hearing, tympanic membrane, ear canal and external ear normal.      Left Ear: Hearing, tympanic membrane, ear canal and external ear normal.      Nose: Nose normal. No nasal tenderness or congestion.      Right Sinus: No maxillary sinus tenderness or frontal sinus tenderness.      Left Sinus: No maxillary sinus tenderness or frontal sinus tenderness.      Mouth/Throat:      Lips: Pink.      Mouth: Mucous membranes are moist.      Pharynx: Oropharynx is clear. Uvula midline.   Eyes:      General: Lids are normal. No scleral icterus.     Extraocular Movements:      Right eye: Normal extraocular motion and no nystagmus.      Left eye: Normal extraocular motion and no nystagmus.      Conjunctiva/sclera: Conjunctivae normal.      Pupils: Pupils are equal, round, and reactive to light.   Neck:      Thyroid: No thyromegaly or thyroid tenderness.      Vascular: No carotid bruit or JVD.      Trachea: No tracheal " tenderness.   Cardiovascular:      Rate and Rhythm: Normal rate and regular rhythm.      Pulses:           Dorsalis pedis pulses are 2+ on the right side and 2+ on the left side.        Posterior tibial pulses are 2+ on the right side and 2+ on the left side.      Heart sounds: Normal heart sounds, S1 normal and S2 normal. No murmur heard.  Pulmonary:      Effort: Pulmonary effort is normal. No accessory muscle usage, prolonged expiration or respiratory distress.      Breath sounds: Normal breath sounds.   Chest:      Chest wall: No tenderness.   Abdominal:      General: Bowel sounds are normal. There is no distension.      Palpations: Abdomen is soft. There is no hepatomegaly, splenomegaly or mass.      Tenderness: There is no abdominal tenderness.   Musculoskeletal:         General: Tenderness present.      Cervical back: Normal range of motion and neck supple.      Lumbar back: Tenderness present.      Right lower leg: No edema.      Left lower leg: No edema.      Right ankle: Tenderness present. Decreased range of motion.      Left ankle: Tenderness present. Decreased range of motion.      Right foot: Decreased range of motion. Normal capillary refill. Swelling and tenderness present.      Left foot: Decreased range of motion. Normal capillary refill. Tenderness present.      Comments: No muscular atrophy or flaccidity.  Left and right foot with brace for support     Lymphadenopathy:      Head:      Right side of head: No submental or submandibular adenopathy.      Left side of head: No submental or submandibular adenopathy.      Cervical: No cervical adenopathy.      Right cervical: No superficial cervical adenopathy.     Left cervical: No superficial cervical adenopathy.   Skin:     General: Skin is warm and dry.      Capillary Refill: Capillary refill takes less than 2 seconds.      Coloration: Skin is not jaundiced or pale.      Findings: No erythema.      Nails: There is no clubbing.   Neurological:       Mental Status: He is alert and oriented to person, place, and time.      Cranial Nerves: No cranial nerve deficit or facial asymmetry.      Sensory: No sensory deficit.      Motor: No weakness, tremor, atrophy or abnormal muscle tone.      Coordination: Coordination normal.      Gait: Gait abnormal (moderate antalgia).      Deep Tendon Reflexes: Reflexes are normal and symmetric.   Psychiatric:         Attention and Perception: He is attentive.         Mood and Affect: Mood normal. Mood is not anxious or depressed.         Speech: Speech normal.         Behavior: Behavior normal. Behavior is cooperative.         Thought Content: Thought content normal.         Cognition and Memory: Cognition normal.         Judgment: Judgment normal.         Diagnoses and all orders for this visit:    1. Essential hypertension (Primary)  Assessment & Plan:  Continue lisinopril 30 mg.  Continue under the care of cardiology.  Ambulatory BP monitoring either at home or random community checks.  Patient to report continued elevations >140/90.  Patient may come by office for checks if needed.       Orders:  -     aspirin (Aspirin EC Adult Low Dose) 81 MG EC tablet; Take 1 tablet by mouth Daily.  Dispense: 30 tablet; Refill: 3    2. Mixed hyperlipidemia  Assessment & Plan:  Continue Crestor 40 mg  and continue Repatha injections  We will continue to monitor Lipid panel    Orders:  -     aspirin (Aspirin EC Adult Low Dose) 81 MG EC tablet; Take 1 tablet by mouth Daily.  Dispense: 30 tablet; Refill: 3  -     rosuvastatin (Crestor) 40 MG tablet; Take 1 tablet by mouth Daily.  Dispense: 30 tablet; Refill: 5    3. Psychophysiological insomnia  Comments:  Continue mirtazapine 45 mg.  continue Restoril to 30 mg.  Patient to report any negative side effects.  Continue under the [facility] for mental health support  Overview:  With depression and anxiety      Orders:  -     mirtazapine (REMERON) 30 MG tablet; Take 1 & 1/2  tablets by mouth Every  Night.  Dispense: 45 tablet; Refill: 5  -     temazepam (RESTORIL) 30 MG capsule; Take 1 capsule by mouth At Night As Needed for Sleep.  Dispense: 30 capsule; Refill: 1    4. Seizures  Comments:  No known recent activity.  Continue Dilantin.  We will plan for an updated phenytoin level  Orders:  -     Dilantin 100 MG capsule; Take 2 capsules by mouth 2 (Two) Times a Day.  Dispense: 120 capsule; Refill: 5    5. Drug-induced constipation  Comments:  will start docusate  push fluids  Orders:  -     docusate calcium (SURFAK) 240 MG capsule; Take 1 capsule by mouth 2 (Two) Times a Day As Needed for Constipation.  Dispense: 60 capsule; Refill: 2    6. Vitamin D deficiency  -     ergocalciferol (ERGOCALCIFEROL) 1.25 MG (23224 UT) capsule; Take 1 capsule by mouth 1 (One) Time Per Week.  Dispense: 4 capsule; Refill: 5    7. Class 2 severe obesity due to excess calories with serious comorbidity and body mass index (BMI) of 36.0 to 36.9 in adult  Assessment & Plan:  Continue to work on diet changes and be active as physically able.    Orders:  -     phentermine (Adipex-P) 37.5 MG tablet; Take 1 tablet by mouth Every Morning Before Breakfast.  Dispense: 30 tablet; Refill: 0    8. Gastroesophageal reflux disease without esophagitis  -     dexlansoprazole (Dexilant) 60 MG capsule; Take 1 capsule by mouth 2 (Two) Times a Day.  Dispense: 60 capsule; Refill: 5    Other orders  -     vitamin C (ASCORBIC ACID) 500 MG tablet; Take 1 tablet by mouth 2 (Two) Times a Day.  Dispense: 60 tablet; Refill: 5         Understands disease processes and need for medications.  Understands reasons for urgent and emergent care.  Patient (& family) verbalized agreement for treatment plan.   Emotional support and active listening provided.  Patient provided time to verbalize feelings.    CHAVEZ/LINN reviewed today and consistent.  Will refill prescribed controlled medication today.  Patient is aware they cannot receive narcotics from any other provider  except if under care of pain management or speciality clinic.  Risk and benefits of medication use has been reviewed.  History and physical exam exhibit continued safe and appropriate use of controlled substances.  The patient is aware of the potential for addiction and dependence.  This patient has been made aware of the appropriate use of such medications, including potential risk of somnolence, limited ability to drive and / or work safely, and potential for overdose.    It has also been made clear that these medications are for use by this patient only, without concomitant use of alcohol or other substances unless prescribed/advised by medical provider.  Patient understands they may be subject to UDS and pill counts at random.        Patient considered to be moderate risk for addiction due to use of multiple controlled medications.  Patient understands and accepts these risks.  Patient need for medication will be reassessed at each visit.  Doses will be adjusted according to patient need and findings.    Goal of TX: Patient will not have any adverse reactions of medication.  Patient will have improvement in sleep hygiene/pattern with use of Restoril as needed as directed.  Patient will have reduction in weight with use of Adipex as directed with simultaneous diet and lifestyle changes.      .  CHAVEZ Patient Controlled Substance Report (from 5/7/2024 to 5/7/2025)    Dispensed  Strength Quantity Days Supply Provider Pharmacy   04/09/2025 Temazepam 30MG 30 each 30 [facility]...   03/12/2025 Temazepam 30MG 30 each 30 [facility]...   02/03/2025 Temazepam 30MG 30 each 30 [facility]...   01/23/2025 Hydrocodone/Acetaminophen 325MG/7.5MG 25 each 7 [facility]   01/23/2025 Temazepam 15MG 30 each 30 [facility]...   12/27/2024 Hydrocodone Bitartrate/Ac 325MG/10MG 35 each 9 TALA STEWART  [facility]   12/17/2024 Temazepam 15MG 30 each 30 LENNOX RO[facility]...        RTC 1 month, sooner if needed.           This document has been electronically signed by:  BALDOMERO Thao FNP-C Dragon disclaimer:  Part of this note may be an electronic transcription/translation of spoken language to printed text using the Dragon Dictation System.

## 2025-05-08 ENCOUNTER — HOSPITAL ENCOUNTER (OUTPATIENT)
Dept: PHYSICAL THERAPY | Facility: HOSPITAL | Age: 55
Setting detail: THERAPIES SERIES
Discharge: HOME | End: 2025-05-08
Payer: COMMERCIAL

## 2025-05-08 DIAGNOSIS — G89.29 CHRONIC PAIN OF RIGHT ANKLE: Primary | ICD-10-CM

## 2025-05-08 DIAGNOSIS — M25.571 CHRONIC PAIN OF RIGHT ANKLE: Primary | ICD-10-CM

## 2025-05-08 DIAGNOSIS — R26.9 IMPAIRED GAIT: ICD-10-CM

## 2025-05-08 DIAGNOSIS — Z98.890 HISTORY OF ANKLE SURGERY: ICD-10-CM

## 2025-05-08 DIAGNOSIS — R26.89 ANTALGIC GAIT: ICD-10-CM

## 2025-05-08 DIAGNOSIS — J32.1 CHRONIC FRONTAL SINUSITIS: ICD-10-CM

## 2025-05-08 PROCEDURE — 97110 THERAPEUTIC EXERCISES: CPT | Performed by: PHYSICAL THERAPIST

## 2025-05-08 PROCEDURE — 97140 MANUAL THERAPY 1/> REGIONS: CPT | Performed by: PHYSICAL THERAPIST

## 2025-05-08 RX ORDER — MONTELUKAST SODIUM 10 MG/1
10 TABLET ORAL NIGHTLY
Qty: 90 TABLET | Refills: 2 | Status: SHIPPED | OUTPATIENT
Start: 2025-05-08

## 2025-05-08 NOTE — PROGRESS NOTES
Physical Therapy Daily Treatment Note      Patient: Jaquan Mckay   : 1970  Referring practitioner: Khurram Florez DPM  Date of Initial Visit: Type: THERAPY  Episode: S/P Right Brostrom/achilles repair  Today's Date: 2025  Patient seen for 18 sessions       Visit Diagnoses:    ICD-10-CM ICD-9-CM   1. Chronic pain of right ankle  M25.571 719.47    G89.29 338.29   2. History of ankle surgery  Z98.890 V45.89   3. Antalgic gait  R26.89 781.2   4. Impaired gait  R26.9 781.2       Subjective Evaluation    History of Present Illness    Subjective comment: Patient reports that his ankle is hurting more today and believes it is due to the weather.       Objective   See Exercise, Manual, and Modality Logs for complete treatment.       Assessment & Plan       Assessment  Assessment details: Therapy session initiated with , followed by there ex, manual therapy, and neuromuscular re-education.  Tenderness reported at right lateral ankle during STM.  Standing activities reduced, due to increased pain today.  Session concluded with cryotherapy.  He will continue to be progressed per his tolerance and POC.          Timed:         Manual Therapy:    15     mins  33202;     Therapeutic Exercise:    25     mins  35497;     Neuromuscular Jazmin:    9    mins  59108;    Therapeutic Activity:          mins  93023;     Gait Training:           mins  59034;     Ultrasound:          mins  25913;    Ionto                                   mins   86770  Self Care                            mins   92178      Un-Timed:  Electrical Stimulation:         mins  70959 ( );  Dry Needling          mins self-pay  Traction          mins 12822  Canalith Repos         mins 79203  : 6 min  Ice: 6 min    Timed Treatment:   49   mins   Total Treatment:     61   mins    Hanna Burks PT  [place] License: [id number]  Electronically signed by Hanna Burks PT, 25, 2:50 PM EDT.

## 2025-05-13 ENCOUNTER — SPECIALTY PHARMACY (OUTPATIENT)
Dept: PHARMACY | Facility: HOSPITAL | Age: 55
End: 2025-05-13
Payer: COMMERCIAL

## 2025-05-13 ENCOUNTER — HOSPITAL ENCOUNTER (OUTPATIENT)
Dept: PHYSICAL THERAPY | Facility: HOSPITAL | Age: 55
Setting detail: THERAPIES SERIES
Discharge: HOME | End: 2025-05-13
Payer: COMMERCIAL

## 2025-05-13 DIAGNOSIS — G89.29 CHRONIC PAIN OF RIGHT ANKLE: Primary | ICD-10-CM

## 2025-05-13 DIAGNOSIS — Z98.890 HISTORY OF ANKLE SURGERY: ICD-10-CM

## 2025-05-13 DIAGNOSIS — R26.9 IMPAIRED GAIT: ICD-10-CM

## 2025-05-13 DIAGNOSIS — M25.571 CHRONIC PAIN OF RIGHT ANKLE: Primary | ICD-10-CM

## 2025-05-13 DIAGNOSIS — R26.89 ANTALGIC GAIT: ICD-10-CM

## 2025-05-13 PROCEDURE — 97140 MANUAL THERAPY 1/> REGIONS: CPT | Performed by: PHYSICAL THERAPIST

## 2025-05-13 PROCEDURE — 97110 THERAPEUTIC EXERCISES: CPT | Performed by: PHYSICAL THERAPIST

## 2025-05-13 NOTE — THERAPY EVALUATION
Physical Therapy Re Certification Of Plan of Care  Patient: Jaquan Mckay   : 1970  Diagnosis/ICD-10 Code:    Referring practitioner: Khurram Florez DPM  Date of Initial Visit: Type: THERAPY  Episode: S/P Right Brostrom/achilles repair  Today's Date: 2025  Patient seen for 19 sessions         Visit Diagnoses:    ICD-10-CM ICD-9-CM   1. Chronic pain of right ankle  M25.571 719.47    G89.29 338.29   2. History of ankle surgery  Z98.890 V45.89   3. Antalgic gait  R26.89 781.2   4. Impaired gait  R26.9 781.2         Jaquan Mckay reports:   Subjective Questionnaire: LEFS: 58%  Clinical Progress: improved  Home Program Compliance: Yes  Treatment has included: therapeutic exercise, neuromuscular re-education, manual therapy, therapeutic activity, gait training, electrical stimulation, moist heat, and cryotherapy      Subjective Evaluation    History of Present Illness    Subjective comment: Pt reports his MD wants him to cont tx for max gains.  Pt reports he cont to have difficulty with standing long durations.Pain  Current pain ratin  At best pain ratin  At worst pain ratin           Objective          Active Range of Motion     Right Ankle/Foot   Dorsiflexion (ke): 5 degrees   Plantar flexion: 67 degrees   Inversion: 42 degrees   Eversion: 19 degrees           Assessment & Plan       Assessment  Impairments: abnormal coordination, abnormal gait, abnormal muscle firing, abnormal muscle tone, abnormal or restricted ROM, activity intolerance, impaired balance, impaired physical strength, lacks appropriate home exercise program, pain with function and weight-bearing intolerance   Functional limitations: lifting, walking, uncomfortable because of pain, standing and unable to perform repetitive tasks   Assessment details: Pt is a 53 y/o male referred to therapy for treatment following a right Brostrom repair and achilles repair.  Pt has attended 19 sessions with improved gait, leg stability and ROM.   Pt is motivated to cont with therapy yet cont to report pain as great as 8/10 with increased walking and standing.  Prognosis: good    Goals  Plan Goals: STG 6 weeks    1 Pt will be instructed in a HEP.met  2 Pt will improve his LEFs to less than 50% progressing  3 Pt will demonstrate 5 degrees from neutral DF.met    LTG 12 weeks    1 Pt will demonstrate 4/5 gross right ankle strength.met  2 Pt will improve his LEFs to less than 25%.not met  3 Pt will amb with improved stance on right LE with no AD.met  4 Pt will improve his right ankle ROM from 0 DF to 65 degree PF. met    Plan  Therapy options: will be seen for skilled therapy services  Planned modality interventions: cryotherapy, TENS, thermotherapy (hydrocollator packs) and ultrasound  Planned therapy interventions: ADL retraining, balance/weight-bearing training, body mechanics training, flexibility, functional ROM exercises, gait training, home exercise program, IADL retraining, joint mobilization, manual therapy, motor coordination training, neuromuscular re-education, postural training, soft tissue mobilization, spinal/joint mobilization, strengthening, stretching and therapeutic activities  Frequency: 1x week  Duration in weeks: 8  Treatment plan discussed with: patient  Plan details: Will follow for optimal gains.  Moderate Evaluation  18453  Re-evaluation   15958  Therapeutic exercise  41723  Therapeutic activity    84488  Neuromuscular re-education   23529  Manual therapy   72949  Gait training  21898    Unattended e-stim (Medicaid/Medicare)     Moist heat/cryotherapy 27668   Ultrasound   64454               Recommendations: Continue as planned  Timeframe: 2 months  Prognosis to achieve goals: fair      Timed:         Manual Therapy:    15     mins  97221;     Therapeutic Exercise:    31     mins  17737;     Neuromuscular Jazmin:        mins  17266;    Therapeutic Activity:          mins  08995;     Gait Training:           mins  67198;      Ultrasound:          mins  05821;    Ionto                                   mins   45502  Self Care                            mins   75320    Un-Timed:  Electrical Stimulation:         mins  88093 ( );  Dry Needling          mins self-pay  Traction          mins 25804  Re-Eval                               mins  14984  Canalith Repos         mins 37927    Timed Treatment:  46    mins   Total Treatment:     52   mins(6min mh)          PT: Win Rodriguez PT     [place] License:  [id number]    Electronically signed by Win Rodriguez PT, 05/13/25, 11:05 AM EDT    Certification Period: 5/13/2025 thru 8/10/2025  I certify that the therapy services are furnished while this patient is under my care.  The services outlined above are required by this patient, and will be reviewed every 90 days.         Physician Signature:__________________________________________________    PHYSICIAN: Khurram Florez DPM  NPI: [id number]                                      DATE:  :     Please sign and return via fax to [email or web address] . Thank you, [facility]

## 2025-05-13 NOTE — PROGRESS NOTES
"   Medication Management Clinic/ Specialty Pharmacy Patient Management Program  Lipid Management Program - PCSK9i follow up Assessment     Jaquan Mckay is a 54 y.o. male referred by their provider, Tiera Valenzuela, to the Hyperlipidemia Patient Management program offered by [facility] for Lipid Management.  Jaquan Mckay is  treated for ASCVD and hyperlipidemia, and currently takes crestor 40 mg.  In the past, Pt has tried lipitor 40 mg, zocor 10 mg, pravastatin 80 mg and is not statin intolerant. The patient denies any allergies to latex.       A follow-up outreach was conducted, including assessment of continued therapy appropriateness, medication adherence, and side effect incidence and management for Repatha. Patient comes to clinic for injections and has not missed any doses.  Repatha was started on 10/29/24, and patient reports tolerating the medication well with no side effects.    He reports he tolerated last Repatha injection without any issues. He denies any side effects.     Changes to Insurance Coverage or Financial Support  [employer] [place] (no change)    Relevant Past Medical History and Comorbidities  Relevant medical history and concomitant health conditions were discussed with the patient. The patient's chart has been reviewed for relevant past medical history and comorbid health conditions and updated as necessary.   Past Medical History:   Diagnosis Date    Allergic     Anxiety     Arthritis     Asthma     Body piercing     REPORTS CYLICONE IN EARS    Clotting disorder 2004    had a knee surgery    Coronary artery disease     Depression     DVT (deep venous thrombosis)     RIGHT RIGHT KNEE AFTER SURGERY YEARS AGO IN 2001 OR 2004    Elevated cholesterol     Gastric ulcer     GERD (gastroesophageal reflux disease)     H/O migraine     Headache     Heart attack     REPORTS \"LIGHT HEART ATTACK A LONG TIME AGO\"  \"EARLY 90'S\"    History of " "seizures     REPORTS LAST EPISODE WAS AROUND 1995.    Hostility     Hyperlipidemia     Hypertension     Knee pain, acute     Left    Low back pain     Lyme disease     Migraine     MRSA (methicillin resistant Staphylococcus aureus)     REPORTS LAST TESTED + 2004. WAS TREATED HE REPORTS.  RIGHT ARM, RIGHT KNEE.    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     Obesity     Poor historian     [place] spotted fever     Seizures     Sleep apnea     Tattoo     Wears glasses      Social History     Socioeconomic History    Marital status:      Spouse name: Becca    Number of children: 2    Years of education: 12   Tobacco Use    Smoking status: Every Day     Current packs/day: 1.00     Average packs/day: 1 pack/day for 18.2 years (18.2 ttl pk-yrs)     Types: Cigars, Cigarettes     Start date: 4/11/2022     Passive exposure: Current    Smokeless tobacco: Never   Vaping Use    Vaping status: Never Used   Substance and Sexual Activity    Alcohol use: No    Drug use: No    Sexual activity: Defer     Partners: Female     Birth control/protection: None          Hospitalizations and Urgent Care Since Last Assessment  ED Visits, Admissions, or Hospitalizations: none  Urgent Office Visits: none    Allergies  Known allergies and reactions were discussed with the patient. The patient's chart has been reviewed for allergy information and updated as necessary.   Allergies   Allergen Reactions    Ciprofloxacin Anaphylaxis and Hives    Miralax [Polyethylene Glycol] Itching and Rash    Mobic [Meloxicam] Other (See Comments)     Pt states, \"It make my feet and hands go numb and I can't hardly walk.\"     Paxil [Paroxetine Hcl] Shortness Of Breath     Chest pain     Peanut-Containing Drug Products Anaphylaxis    Penicillins Anaphylaxis    Pristiq [Desvenlafaxine Succinate Er] Dizziness    Sulfa Antibiotics Anaphylaxis, Itching and Rash    Doxycycline Hives    Fish-Derived Products Hives    Isosorbide Nitrate Rash     Rash, hives, had to use " inhaler.     Lyrica [Pregabalin] Hives    Movantik [Naloxegol] Rash    Seroquel [Quetiapine] Hives and Rash    Trulance [Plecanatide] Hives    Buspar [Buspirone] Rash    Clarithromycin Rash    Clindamycin/Lincomycin Rash    Codeine Rash    Contrast Dye (Echo Or Unknown Ct/Mr) Itching and Rash    Diltiazem Rash    Elavil [Amitriptyline] Rash    Flomax [Tamsulosin] Hives    Gabapentin Rash    Iodinated Contrast Media Itching and Rash    Keflex [Cephalexin] Rash    Levocetirizine Rash    Linzess [Linaclotide] Rash    Metoprolol Rash    Prednisone Rash and Other (See Comments)     Face, feet, and legs go completely numb per patient    Robitussin Cough+ Chest Max St [Dextromethorphan-Guaifenesin] Itching    Shrimp (Diagnostic) Rash    Spironolactone Rash    Viibryd [Vilazodone Hcl] Itching and Rash    Zoloft [Sertraline Hcl] Hives and Itching          Relevant Laboratory Values  Relevant laboratory values were discussed with the patient. The following specialty medication dose adjustment(s) are recommended: none    Lab Results   Component Value Date    GLUCOSE 95 02/12/2025    CALCIUM 9.1 02/12/2025     02/12/2025    K 3.9 02/12/2025    CO2 24.7 02/12/2025     02/12/2025    BUN 15 02/12/2025    CREATININE 0.93 02/12/2025    EGFRIFAFRI  08/26/2016      Comment:      <15 Indicative of kidney failure.    EGFRIFNONA 66 02/02/2022    BCR 16.1 02/12/2025    ANIONGAP 11.3 02/12/2025     Lab Results   Component Value Date    CHOL 144 01/31/2025    CHLPL 232 (H) 04/05/2016    TRIG 87 01/31/2025    HDL 64 (H) 01/31/2025    LDL 64 01/31/2025       Current Medication List  This medication list has been reviewed with the patient and evaluated for any interactions or necessary modifications/recommendations, and updated to include all prescription medications, OTC medications, and supplements the patient is currently taking.  This list reflects what is contained in the patient's profile, which has also been marked as  reviewed to communicate to other providers it is the most up to date version of the patient's current medication therapy.     Current Outpatient Medications:     albuterol sulfate  (90 Base) MCG/ACT inhaler, Inhale 2 puffs by mouth every 4 hours as needed., Disp: 18 g, Rfl: 3    Alcohol Swabs (Alcohol Prep) 70 % pads, Use 1 each 2 (Two) Times a Day., Disp: 60 each, Rfl: 5    aspirin (Aspirin EC Adult Low Dose) 81 MG EC tablet, Take 1 tablet by mouth Daily., Disp: 30 tablet, Rfl: 3    azelastine (ASTELIN) 0.1 % nasal spray, Administer 1 spray into the nostril(s) as directed by provider 2 (Two) Times a Day., Disp: 30 mL, Rfl: 5    cloNIDine (Catapres) 0.1 MG tablet, Take 1 tablet by mouth 2 (Two) Times a Day As Needed for High Blood Pressure (if bp more than 140/90)., Disp: 60 tablet, Rfl: 1    dexAMETHasone (DECADRON) 4 MG/ML injection, Use 1 mL at Physical Therapy for ionthophoresis., Disp: 40 mL, Rfl: 0    dexlansoprazole (Dexilant) 60 MG capsule, Take 1 capsule by mouth 2 (Two) Times a Day., Disp: 60 capsule, Rfl: 5    Diclofenac Sodium (VOLTAREN) 1 % gel gel, Apply 2 grams topically to the affect area as directed 4 (Four) Times a Day., Disp: 200 g, Rfl: 2    Dilantin 100 MG capsule, Take 2 capsules by mouth 2 (Two) Times a Day., Disp: 120 capsule, Rfl: 5    diphenhydrAMINE (Benadryl Allergy) 25 MG tablet, Take 1-2 tablets by mouth Every 8 (Eight) Hours As Needed for Itching (or rash)., Disp: 120 tablet, Rfl: 2    docusate calcium (SURFAK) 240 MG capsule, Take 1 capsule by mouth 2 (Two) Times a Day As Needed for Constipation., Disp: 60 capsule, Rfl: 2    Elastic Bandages & Supports (ACE Ankle Brace) misc, 1 each Daily., Disp: 1 each, Rfl: 0    EPINEPHrine (EPIPEN) 0.3 MG/0.3ML solution auto-injector injection, Inject Intramuscularly into lateral thigh as needed for treatment of anaphylaxis, then go to the ER or call 911., Disp: 2 each, Rfl: 2    ergocalciferol (ERGOCALCIFEROL) 1.25 MG (83302 UT) capsule,  Take 1 capsule by mouth 1 (One) Time Per Week., Disp: 4 capsule, Rfl: 5    Evolocumab (REPATHA) solution auto-injector SureClick injection, Inject 1 mL under the skin into the appropriate area as directed Every 14 (Fourteen) Days., Disp: 2 mL, Rfl: 5    fluticasone (FLONASE) 50 MCG/ACT nasal spray, Use 1 spray in each nostril Twice a day for 30 days, Disp: 16 g, Rfl: 11    fluticasone (Flovent HFA) 110 MCG/ACT inhaler, Inhale 1 puff by mouth 2 (Two) Times a Day., Disp: 12 g, Rfl: 5    fluticasone (FLOVENT HFA) 44 MCG/ACT inhaler, Inhale 1 puff by mouth twice a day., Disp: 10.6 g, Rfl: 5    glucose blood (OneTouch Ultra) test strip, Use as instructed 2 times daily and as needed, Disp: 100 each, Rfl: 3    HYDROcodone-acetaminophen (NORCO) 5-325 MG per tablet, Take 1 tablet by mouth Every 8 (Eight) Hours for 10 days  (max daily dose of 3 tablets)., Disp: 30 tablet, Rfl: 0    HYDROcodone-acetaminophen (Norco) 7.5-325 MG per tablet, Take 1 tablet by mouth every 6 (six) hours as needed for pain for up to 10 days. Max Daily Amount: 4 tablets, Disp: 25 tablet, Rfl: 0    ibuprofen (ADVIL,MOTRIN) 800 MG tablet, Take 1 tablet by mouth 2 (Two) Times a Day As Needed., Disp: 40 tablet, Rfl: 0    ibuprofen (ADVIL,MOTRIN) 800 MG tablet, Take 1 tablet by mouth Every 6 (Six) Hours As Needed., Disp: 40 tablet, Rfl: 1    ipratropium-albuterol (Combivent Respimat)  MCG/ACT inhaler, INHALE 1 PUFF BY MOUTH 4 (FOUR) TIMES A DAY AS NEEDED FOR WHEEZING., Disp: 4 g, Rfl: 5    ipratropium-albuterol (DUO-NEB) 0.5-2.5 mg/3 ml nebulizer, Inhale the contents of 3 mL vial nebulizer every 6 hours as needed., Disp: 360 mL, Rfl: 3    Lancets (OneTouch Delica Plus [id number]) misc, Use as instructed 2 times daily and as needed, Disp: 100 each, Rfl: 3    levocetirizine (XYZAL) 5 MG tablet, Take 1 tablet by mouth in the evening Once a day 30 days, Disp: 30 tablet, Rfl: 11    lisinopril (PRINIVIL,ZESTRIL) 30 MG tablet, Take 1 tablet by mouth Daily  for blood pressure., Disp: 90 tablet, Rfl: 1    loratadine (CLARITIN) 10 MG tablet, Take 1 tablet by mouth Daily As Needed for Allergies., Disp: 30 tablet, Rfl: 5    methocarbamol (ROBAXIN) 750 MG tablet, Take 1 tablet by mouth 3 (Three) Times a Day., Disp: 90 tablet, Rfl: 0    mirtazapine (REMERON) 30 MG tablet, Take 1 & 1/2  tablets by mouth Every Night., Disp: 45 tablet, Rfl: 5    montelukast (SINGULAIR) 10 MG tablet, Take 1 tablet by mouth Every Night., Disp: 90 tablet, Rfl: 2    mupirocin (BACTROBAN) 2 % ointment, Apply 1 Application topically to the appropriate area as directed 3 (Three) Times a Day., Disp: 30 g, Rfl: 0    ondansetron (Zofran) 4 MG tablet, Take 1 tablet by mouth Every 8 (Eight) Hours As Needed for Nausea., Disp: 20 tablet, Rfl: 0    phentermine (Adipex-P) 37.5 MG tablet, Take 1 tablet by mouth Every Morning Before Breakfast., Disp: 30 tablet, Rfl: 0    rosuvastatin (Crestor) 40 MG tablet, Take 1 tablet by mouth Daily., Disp: 30 tablet, Rfl: 5    sodium chloride 0.65 % nasal spray, Use 1-3 sprays in each nostril Three times a day as needed 30 days, Disp: 44 mL, Rfl: 11    tamsulosin (FLOMAX) 0.4 MG capsule 24 hr capsule, Take 1 capsule by mouth Daily., Disp: 90 capsule, Rfl: 3    temazepam (RESTORIL) 30 MG capsule, Take 1 capsule by mouth At Night As Needed for Sleep., Disp: 30 capsule, Rfl: 1    vitamin C (ASCORBIC ACID) 500 MG tablet, Take 1 tablet by mouth 2 (Two) Times a Day., Disp: 60 tablet, Rfl: 5         Drug Interactions  No significant drug-drug interactions with Repatha according to literature.     Adverse Drug Reactions        Plan for ADR Management: n/a    Adherence, Self-Administration, and Current Therapy Problems  Adherence related to the patient's specialty therapy was discussed with the patient. The Adherence segment of this outreach has been reviewed and updated.          Additional Barriers to Patient Self-Administration: patient refuses to administer himself  Methods for  Supporting Patient Self-Administration: patient comes to [facility] every 2 weeks for injections    Open Medication Therapy Problems  No medication therapy recommendations to display    Goals of Therapy  Goals related to the patient's specialty therapy were discussed with the patient. The Patient Goals segment of this outreach has been reviewed and updated.   Goals Addressed Today    None     LDL was 136 mg/dl on 3/20/24 and has improved to 132 mg/ml on 10/17/24. This lab work was completed prior to starting repatha therapy.    Quality of Life Assessment   Quality of Life related to the patient's enrollment in the patient management program and services provided was discussed with the patient. The QOL segment of this outreach has been reviewed and updated.       Reassessment Plan & Follow-Up  1.Medication Therapy Changes: Patient will continue Repatha 140mg every 2 weeks    A. I administered injection in to the back of his LEFT arm   2. Related Plans, Therapy Recommendations, or Issues to Be Addressed: none  3. Pharmacist to perform regular assessments no more than (6) months from the previous assessment.  Patient will continue regular follow-up with referring provider.   4. Care Coordinator to set up future refill outreaches, coordinate prescription delivery, and escalate clinical questions to pharmacist.      Attestation      I attest the patient was actively involved in and has agreed to the above plan of care.  If the prescribed therapy is at any point deemed not appropriate based on the current or future assessments, a consultation will be initiated with the patient's specialty care provider to determine the best course of action. The revised plan of therapy will be documented along with any required assessments and/or additional patient education provided.     Patient to follow up for injections in Clinic every 2 weeks    Grecia Herron, PharmD  5/13/2025  15:19 EDT

## 2025-05-21 ENCOUNTER — SPECIALTY PHARMACY (OUTPATIENT)
Dept: PHARMACY | Facility: HOSPITAL | Age: 55
End: 2025-05-21
Payer: COMMERCIAL

## 2025-05-21 NOTE — PROGRESS NOTES
Specialty Pharmacy Patient Management Program  Refill Outreach     Jaquan was contacted today regarding refills of their medication(s).    Refill Questions      Flowsheet Row Most Recent Value   Changes to allergies? No   Changes to medications? No   New conditions or infections since last clinic visit No   Unplanned office visit, urgent care, ED, or hospital admission in the last 4 weeks  No   How does patient/caregiver feel medication is working? Good   Financial problems or insurance changes  No   Since the previous refill, were any specialty medication doses or scheduled injections missed or delayed?  No   Does this patient require a clinical escalation to a pharmacist? No            Delivery Questions      Flowsheet Row Most Recent Value   Delivery method  at Pharmacy   Delivery address Prescription   Medication(s) being filled and delivered Evolocumab (REPATHA)   Copay verified? Yes   Copay amount $0   Copay form of payment No copayment ($0)   Signature Required No                 Follow-up: 28 day(s)     Ruth Yip, Pharmacy Technician  5/21/2025  17:03 EDT

## 2025-05-22 ENCOUNTER — HOSPITAL ENCOUNTER (OUTPATIENT)
Dept: PHYSICAL THERAPY | Facility: HOSPITAL | Age: 55
Setting detail: THERAPIES SERIES
Discharge: HOME | End: 2025-05-22
Payer: COMMERCIAL

## 2025-05-22 DIAGNOSIS — G89.29 CHRONIC PAIN OF RIGHT ANKLE: Primary | ICD-10-CM

## 2025-05-22 DIAGNOSIS — R26.9 IMPAIRED GAIT: ICD-10-CM

## 2025-05-22 DIAGNOSIS — M25.571 CHRONIC PAIN OF RIGHT ANKLE: Primary | ICD-10-CM

## 2025-05-22 DIAGNOSIS — Z98.890 HISTORY OF ANKLE SURGERY: ICD-10-CM

## 2025-05-22 DIAGNOSIS — R26.89 ANTALGIC GAIT: ICD-10-CM

## 2025-05-22 PROCEDURE — 97140 MANUAL THERAPY 1/> REGIONS: CPT | Performed by: PHYSICAL THERAPIST

## 2025-05-22 PROCEDURE — 97530 THERAPEUTIC ACTIVITIES: CPT | Performed by: PHYSICAL THERAPIST

## 2025-05-22 PROCEDURE — 97110 THERAPEUTIC EXERCISES: CPT | Performed by: PHYSICAL THERAPIST

## 2025-05-22 NOTE — THERAPY EVALUATION
Physical Therapy Daily Treatment Note      Patient: Jaquan Mckay   : 1970  Referring practitioner: Khurram Florez DPM  Date of Initial Visit: Type: THERAPY  Episode: S/P Right Brostrom/achilles repair  Today's Date: 2025  Patient seen for 20 sessions       Visit Diagnoses:    ICD-10-CM ICD-9-CM   1. Chronic pain of right ankle  M25.571 719.47    G89.29 338.29   2. History of ankle surgery  Z98.890 V45.89   3. Antalgic gait  R26.89 781.2   4. Impaired gait  R26.9 781.2       Subjective Evaluation    History of Present Illness    Subjective comment: Pt has 5/10 pain today.       Objective   See Exercise, Manual, and Modality Logs for complete treatment.       Assessment & Plan       Assessment  Assessment details: Tx today consisted of mat exercises and sitting exercises for ankle mobility and stability; followed by functional stepping and proprioceptive training and ended with stm and ice for decreased pain.  Pt noted to have improved gait and mobility with no distress during session today.  Pt demonstrated good understanding of HEP and reported 3/10 post pain.    Plan  Plan details: Will follow as indicated by MD for max gains.          Timed:         Manual Therapy:    15     mins  78265;     Therapeutic Exercise:    14     mins  33352;     Neuromuscular Jazmin:        mins  20428;    Therapeutic Activity:     12     mins  32802;     Gait Training:           mins  50970;     Ultrasound:          mins  93165;    Ionto                                   mins   98608  Self Care                            mins   41284  Canalith Repos         mins 92935      Un-Timed:  Electrical Stimulation:         mins  10749 (MC );  Dry Needling          mins self-pay  Traction          mins 12117      Timed Treatment:   41   mins   Total Treatment:     53   mins(6 min ice and 6 min mh)    Win Rodriguez PT  [place] License: [id number]      Electronically signed by Win Rodriguez PT, 25, 12:57 PM EDT

## 2025-05-24 ENCOUNTER — APPOINTMENT (OUTPATIENT)
Dept: CT IMAGING | Facility: HOSPITAL | Age: 55
End: 2025-05-24
Payer: COMMERCIAL

## 2025-05-24 ENCOUNTER — HOSPITAL ENCOUNTER (EMERGENCY)
Facility: HOSPITAL | Age: 55
Discharge: HOME | End: 2025-05-24
Payer: COMMERCIAL

## 2025-05-24 ENCOUNTER — APPOINTMENT (OUTPATIENT)
Dept: GENERAL RADIOLOGY | Facility: HOSPITAL | Age: 55
End: 2025-05-24
Payer: COMMERCIAL

## 2025-05-24 VITALS
RESPIRATION RATE: 16 BRPM | SYSTOLIC BLOOD PRESSURE: 158 MMHG | WEIGHT: 244.71 LBS | OXYGEN SATURATION: 94 % | BODY MASS INDEX: 38.41 KG/M2 | TEMPERATURE: 97.8 F | HEART RATE: 86 BPM | HEIGHT: 67 IN | DIASTOLIC BLOOD PRESSURE: 98 MMHG

## 2025-05-24 DIAGNOSIS — R07.9 CHEST PAIN, UNSPECIFIED TYPE: Primary | ICD-10-CM

## 2025-05-24 LAB
ALBUMIN SERPL-MCNC: 3.7 G/DL (ref 3.5–5.2)
ALBUMIN/GLOB SERPL: 1.2 G/DL
ALP SERPL-CCNC: 96 U/L (ref 39–117)
ALT SERPL W P-5'-P-CCNC: 22 U/L (ref 1–41)
AMPHET+METHAMPHET UR QL: POSITIVE
AMPHETAMINES UR QL: NEGATIVE
ANION GAP SERPL CALCULATED.3IONS-SCNC: 11.4 MMOL/L (ref 5–15)
AST SERPL-CCNC: 29 U/L (ref 1–40)
BARBITURATES UR QL SCN: POSITIVE
BASOPHILS # BLD AUTO: 0.02 10*3/MM3 (ref 0–0.2)
BASOPHILS NFR BLD AUTO: 0.2 % (ref 0–1.5)
BENZODIAZ UR QL SCN: POSITIVE
BILIRUB SERPL-MCNC: 0.2 MG/DL (ref 0–1.2)
BILIRUB UR QL STRIP: NEGATIVE
BUN SERPL-MCNC: 14 MG/DL (ref 6–20)
BUN/CREAT SERPL: 15.9 (ref 7–25)
BUPRENORPHINE SERPL-MCNC: NEGATIVE NG/ML
CALCIUM SPEC-SCNC: 8.4 MG/DL (ref 8.6–10.5)
CANNABINOIDS SERPL QL: NEGATIVE
CHLORIDE SERPL-SCNC: 105 MMOL/L (ref 98–107)
CLARITY UR: CLEAR
CO2 SERPL-SCNC: 21.6 MMOL/L (ref 22–29)
COCAINE UR QL: NEGATIVE
COLOR UR: YELLOW
CREAT SERPL-MCNC: 0.88 MG/DL (ref 0.76–1.27)
DEPRECATED RDW RBC AUTO: 44.3 FL (ref 37–54)
EGFRCR SERPLBLD CKD-EPI 2021: 103.5 ML/MIN/1.73
EOSINOPHIL # BLD AUTO: 0.16 10*3/MM3 (ref 0–0.4)
EOSINOPHIL NFR BLD AUTO: 1.8 % (ref 0.3–6.2)
ERYTHROCYTE [DISTWIDTH] IN BLOOD BY AUTOMATED COUNT: 12.8 % (ref 12.3–15.4)
FENTANYL UR-MCNC: NEGATIVE NG/ML
GEN 5 1HR TROPONIN T REFLEX: 7 NG/L
GLOBULIN UR ELPH-MCNC: 3.1 GM/DL
GLUCOSE SERPL-MCNC: 124 MG/DL (ref 65–99)
GLUCOSE UR STRIP-MCNC: NEGATIVE MG/DL
HCT VFR BLD AUTO: 41.7 % (ref 37.5–51)
HGB BLD-MCNC: 14.5 G/DL (ref 13–17.7)
HGB UR QL STRIP.AUTO: NEGATIVE
HOLD SPECIMEN: NORMAL
HOLD SPECIMEN: NORMAL
IMM GRANULOCYTES # BLD AUTO: 0.02 10*3/MM3 (ref 0–0.05)
IMM GRANULOCYTES NFR BLD AUTO: 0.2 % (ref 0–0.5)
KETONES UR QL STRIP: NEGATIVE
LEUKOCYTE ESTERASE UR QL STRIP.AUTO: NEGATIVE
LIPASE SERPL-CCNC: 46 U/L (ref 13–60)
LYMPHOCYTES # BLD AUTO: 2.7 10*3/MM3 (ref 0.7–3.1)
LYMPHOCYTES NFR BLD AUTO: 31.2 % (ref 19.6–45.3)
MAGNESIUM SERPL-MCNC: 1.7 MG/DL (ref 1.6–2.6)
MCH RBC QN AUTO: 33 PG (ref 26.6–33)
MCHC RBC AUTO-ENTMCNC: 34.8 G/DL (ref 31.5–35.7)
MCV RBC AUTO: 94.8 FL (ref 79–97)
METHADONE UR QL SCN: NEGATIVE
MONOCYTES # BLD AUTO: 1.15 10*3/MM3 (ref 0.1–0.9)
MONOCYTES NFR BLD AUTO: 13.3 % (ref 5–12)
NEUTROPHILS NFR BLD AUTO: 4.61 10*3/MM3 (ref 1.7–7)
NEUTROPHILS NFR BLD AUTO: 53.3 % (ref 42.7–76)
NITRITE UR QL STRIP: NEGATIVE
NRBC BLD AUTO-RTO: 0 /100 WBC (ref 0–0.2)
NT-PROBNP SERPL-MCNC: 38 PG/ML (ref 0–900)
OPIATES UR QL: NEGATIVE
OXYCODONE UR QL SCN: NEGATIVE
PCP UR QL SCN: NEGATIVE
PH UR STRIP.AUTO: 6.5 [PH] (ref 5–8)
PLATELET # BLD AUTO: 158 10*3/MM3 (ref 140–450)
PMV BLD AUTO: 9.7 FL (ref 6–12)
POTASSIUM SERPL-SCNC: 3.7 MMOL/L (ref 3.5–5.2)
PROT SERPL-MCNC: 6.8 G/DL (ref 6–8.5)
PROT UR QL STRIP: NEGATIVE
RBC # BLD AUTO: 4.4 10*6/MM3 (ref 4.14–5.8)
SODIUM SERPL-SCNC: 138 MMOL/L (ref 136–145)
SP GR UR STRIP: 1.01 (ref 1–1.03)
TRICYCLICS UR QL SCN: NEGATIVE
TROPONIN T NUMERIC DELTA: 1 NG/L
TROPONIN T SERPL HS-MCNC: 6 NG/L
UROBILINOGEN UR QL STRIP: NORMAL
WBC NRBC COR # BLD AUTO: 8.66 10*3/MM3 (ref 3.4–10.8)
WHOLE BLOOD HOLD COAG: NORMAL
WHOLE BLOOD HOLD SPECIMEN: NORMAL

## 2025-05-24 PROCEDURE — 96374 THER/PROPH/DIAG INJ IV PUSH: CPT

## 2025-05-24 PROCEDURE — 71275 CT ANGIOGRAPHY CHEST: CPT | Performed by: RADIOLOGY

## 2025-05-24 PROCEDURE — 36415 COLL VENOUS BLD VENIPUNCTURE: CPT

## 2025-05-24 PROCEDURE — 83735 ASSAY OF MAGNESIUM: CPT

## 2025-05-24 PROCEDURE — 83690 ASSAY OF LIPASE: CPT

## 2025-05-24 PROCEDURE — 81003 URINALYSIS AUTO W/O SCOPE: CPT

## 2025-05-24 PROCEDURE — 25010000002 DIPHENHYDRAMINE PER 50 MG

## 2025-05-24 PROCEDURE — 71045 X-RAY EXAM CHEST 1 VIEW: CPT | Performed by: RADIOLOGY

## 2025-05-24 PROCEDURE — 71045 X-RAY EXAM CHEST 1 VIEW: CPT

## 2025-05-24 PROCEDURE — 80053 COMPREHEN METABOLIC PANEL: CPT

## 2025-05-24 PROCEDURE — 99285 EMERGENCY DEPT VISIT HI MDM: CPT

## 2025-05-24 PROCEDURE — 84484 ASSAY OF TROPONIN QUANT: CPT

## 2025-05-24 PROCEDURE — 80307 DRUG TEST PRSMV CHEM ANLYZR: CPT

## 2025-05-24 PROCEDURE — 25010000002 METHYLPREDNISOLONE PER 125 MG

## 2025-05-24 PROCEDURE — 85025 COMPLETE CBC W/AUTO DIFF WBC: CPT

## 2025-05-24 PROCEDURE — 93005 ELECTROCARDIOGRAM TRACING: CPT

## 2025-05-24 PROCEDURE — 96375 TX/PRO/DX INJ NEW DRUG ADDON: CPT

## 2025-05-24 PROCEDURE — 71275 CT ANGIOGRAPHY CHEST: CPT

## 2025-05-24 PROCEDURE — 25510000001 IOPAMIDOL PER 1 ML

## 2025-05-24 PROCEDURE — 83880 ASSAY OF NATRIURETIC PEPTIDE: CPT

## 2025-05-24 RX ORDER — HYDROCODONE BITARTRATE AND ACETAMINOPHEN 5; 325 MG/1; MG/1
1 TABLET ORAL ONCE
Refills: 0 | Status: COMPLETED | OUTPATIENT
Start: 2025-05-24 | End: 2025-05-24

## 2025-05-24 RX ORDER — ALUMINA, MAGNESIA, AND SIMETHICONE 2400; 2400; 240 MG/30ML; MG/30ML; MG/30ML
15 SUSPENSION ORAL ONCE
Status: COMPLETED | OUTPATIENT
Start: 2025-05-24 | End: 2025-05-24

## 2025-05-24 RX ORDER — LIDOCAINE HYDROCHLORIDE 20 MG/ML
15 SOLUTION OROPHARYNGEAL ONCE
Status: COMPLETED | OUTPATIENT
Start: 2025-05-24 | End: 2025-05-24

## 2025-05-24 RX ORDER — DIPHENHYDRAMINE HYDROCHLORIDE 50 MG/ML
25 INJECTION, SOLUTION INTRAMUSCULAR; INTRAVENOUS ONCE
Status: COMPLETED | OUTPATIENT
Start: 2025-05-24 | End: 2025-05-24

## 2025-05-24 RX ORDER — IOPAMIDOL 755 MG/ML
100 INJECTION, SOLUTION INTRAVASCULAR
Status: COMPLETED | OUTPATIENT
Start: 2025-05-24 | End: 2025-05-24

## 2025-05-24 RX ADMIN — IOPAMIDOL 70 ML: 755 INJECTION, SOLUTION INTRAVENOUS at 04:00

## 2025-05-24 RX ADMIN — DIPHENHYDRAMINE HYDROCHLORIDE 25 MG: 50 INJECTION, SOLUTION INTRAMUSCULAR; INTRAVENOUS at 03:02

## 2025-05-24 RX ADMIN — HYDROCODONE BITARTRATE AND ACETAMINOPHEN 1 TABLET: 5; 325 TABLET ORAL at 05:20

## 2025-05-24 RX ADMIN — LIDOCAINE HYDROCHLORIDE 15 ML: 20 SOLUTION ORAL at 01:04

## 2025-05-24 RX ADMIN — ALUMINUM HYDROXIDE, MAGNESIUM HYDROXIDE, AND DIMETHICONE 15 ML: 400; 400; 40 SUSPENSION ORAL at 01:04

## 2025-05-24 RX ADMIN — METHYLPREDNISOLONE SODIUM SUCCINATE 125 MG: 125 INJECTION, POWDER, LYOPHILIZED, FOR SOLUTION INTRAMUSCULAR; INTRAVENOUS at 03:03

## 2025-05-24 NOTE — ED PROVIDER NOTES
"Subjective   History of Present Illness  Patient is a 54-year-old male who presents today with complaints of chest pain.  He reports that earlier prior to arrival he was talking to his ex-wife on the phone when he began having a \"hot burning knife type feeling\" to his sternum area.  He denies any associated shortness of breath.  He denies cough, congestion, or any other complaints.  He denies nausea, vomiting, abdominal pain, weakness, numbness, or any other complaints.  He is awake, alert, no acute distress upon his arrival.  He presents EMS from his home.        Review of Systems   Constitutional: Negative.  Negative for fever.   HENT: Negative.     Respiratory: Negative.  Negative for chest tightness and shortness of breath.    Cardiovascular:  Positive for chest pain.   Gastrointestinal: Negative.  Negative for abdominal pain.   Endocrine: Negative.    Genitourinary: Negative.  Negative for dysuria.   Skin: Negative.    Neurological: Negative.    Psychiatric/Behavioral: Negative.     All other systems reviewed and are negative.      Past Medical History:   Diagnosis Date    Allergic     Anxiety     Arthritis     Asthma     Body piercing     REPORTS CYLICONE IN EARS    Clotting disorder 2004    had a knee surgery    Coronary artery disease     Depression     DVT (deep venous thrombosis)     RIGHT RIGHT KNEE AFTER SURGERY YEARS AGO IN 2001 OR 2004    Elevated cholesterol     Gastric ulcer     GERD (gastroesophageal reflux disease)     H/O migraine     Headache     Heart attack     REPORTS \"LIGHT HEART ATTACK A LONG TIME AGO\"  \"EARLY 90'S\"    History of seizures     REPORTS LAST EPISODE WAS AROUND 1995.    Hostility     Hyperlipidemia     Hypertension     Knee pain, acute     Left    Low back pain     Lyme disease     Migraine     MRSA (methicillin resistant Staphylococcus aureus)     REPORTS LAST TESTED + 2004. WAS TREATED HE REPORTS.  RIGHT ARM, RIGHT KNEE.    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     Obesity     Poor historian     " "[place] spotted fever     Seizures     Sleep apnea     Tattoo     Wears glasses        Allergies   Allergen Reactions    Ciprofloxacin Anaphylaxis and Hives    Miralax [Polyethylene Glycol] Itching and Rash    Mobic [Meloxicam] Other (See Comments)     Pt states, \"It make my feet and hands go numb and I can't hardly walk.\"     Paxil [Paroxetine Hcl] Shortness Of Breath     Chest pain     Peanut-Containing Drug Products Anaphylaxis    Penicillins Anaphylaxis    Pristiq [Desvenlafaxine Succinate Er] Dizziness    Sulfa Antibiotics Anaphylaxis, Itching and Rash    Doxycycline Hives    Fish-Derived Products Hives    Isosorbide Nitrate Rash     Rash, hives, had to use inhaler.     Lyrica [Pregabalin] Hives    Movantik [Naloxegol] Rash    Seroquel [Quetiapine] Hives and Rash    Trulance [Plecanatide] Hives    Buspar [Buspirone] Rash    Clarithromycin Rash    Clindamycin/Lincomycin Rash    Codeine Rash    Contrast Dye (Echo Or Unknown Ct/Mr) Itching and Rash    Diltiazem Rash    Elavil [Amitriptyline] Rash    Flomax [Tamsulosin] Hives    Gabapentin Rash    Iodinated Contrast Media Itching and Rash    Keflex [Cephalexin] Rash    Levocetirizine Rash    Linzess [Linaclotide] Rash    Metoprolol Rash    Prednisone Rash and Other (See Comments)     Face, feet, and legs go completely numb per patient    Robitussin Cough+ Chest Max St [Dextromethorphan-Guaifenesin] Itching    Shrimp (Diagnostic) Rash    Spironolactone Rash    Viibryd [Vilazodone Hcl] Itching and Rash    Zoloft [Sertraline Hcl] Hives and Itching       Past Surgical History:   Procedure Laterality Date    ABDOMINAL SURGERY      BACK SURGERY      BRAIN SURGERY  1986    Tumor removal     CARDIAC CATHETERIZATION N/A 9/28/2018    Procedure: Left Heart Cath;  Surgeon: Leandro Daily MD;  Location: [facility] CATH INVASIVE LOCATION;  Service: Cardiology    CHOLECYSTECTOMY      COLONOSCOPY      COLONOSCOPY N/A 8/2/2021    Procedure: COLONOSCOPY FOR SCREENING;  Surgeon: " Irivng Azar MD;  [facility];  Service: Gastroenterology;  Laterality: N/A;    CYST REMOVAL      pilonidal cyst    ELBOW EPICONDYLECTOMY Right 7/23/2020    Procedure: LATERAL EPICONDYLAR RELEASE;  Surgeon: Jose Ruiz MD;  [facility];  Service: Orthopedics;  Laterality: Right;    ENDOSCOPY      ENDOSCOPY N/A 8/2/2021    Procedure: ESOPHAGOGASTRODUODENOSCOPY WITH BIOPSY;  Surgeon: Irving Azar MD;  [facility];  Service: Gastroenterology;  Laterality: N/A;  esophageal dilatation to 20mm    FRACTURE SURGERY Right     elbow    KNEE ARTHROSCOPY Left 10/20/2017    Procedure: Diagnostic arthroscopy left knee with chondroplasty;  Surgeon: Marco Aguirre MD;  Location: [facility] OR;  Service:     KNEE ARTHROSCOPY Left 1/11/2021    Procedure: KNEE DIAGNOSTIC ARTHROSCOPY WITH  CHONDROPLASTY patella, femoral and medial;  Surgeon: Raul Eagle MD;  [facility];  Service: Orthopedics;  Laterality: Left;    KNEE SURGERY Right     MOUTH SURGERY      FULL MOUTH EXTRACTION    OTHER SURGICAL HISTORY      REPORTS 7 TICKS REMOVED FROM RIGHT ARM IN 2001 OR 2002    TENNIS ELBOW RELEASE Right 7/23/2020    Procedure: RIGHT TENNIS ELBOW RELEASE;  Surgeon: Jose Ruiz MD;  [facility];  Service: Orthopedics;  Laterality: Right;    TUMOR EXCISION      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       Family History   Problem Relation Age of Onset    Diabetes Mother     Hypertension Mother     Stroke Mother     Diabetes Father     Skin cancer Father     Hypertension Father     Heart attack Father     Diabetes Brother     Hypertension Brother     Heart disease Maternal Aunt     Heart disease Maternal Uncle     Heart disease Paternal Aunt     Heart disease Paternal Uncle     Heart disease Maternal Grandmother     Heart disease Maternal Grandfather     Heart disease Paternal Grandmother     Heart disease Paternal Grandfather        Social History      Socioeconomic History    Marital status:      Spouse name: Becca    Number of children: 2    Years of education: 12   Tobacco Use    Smoking status: Every Day     Current packs/day: 1.00     Average packs/day: 1 pack/day for 18.2 years (18.2 ttl pk-yrs)     Types: Cigars, Cigarettes     Start date: 4/11/2022     Passive exposure: Current    Smokeless tobacco: Never   Vaping Use    Vaping status: Never Used   Substance and Sexual Activity    Alcohol use: No    Drug use: No    Sexual activity: Defer     Partners: Female     Birth control/protection: None           Objective   Physical Exam  Constitutional:       Appearance: He is well-developed. He is obese.   HENT:      Head: Normocephalic.   Cardiovascular:      Rate and Rhythm: Normal rate and regular rhythm.      Heart sounds: Normal heart sounds.   Pulmonary:      Effort: Pulmonary effort is normal.      Breath sounds: Normal breath sounds.   Chest:      Chest wall: Tenderness present.   Abdominal:      General: Bowel sounds are normal.   Musculoskeletal:         General: Normal range of motion.      Cervical back: Normal range of motion.   Neurological:      Mental Status: He is alert and oriented to person, place, and time.       Results for orders placed or performed during the hospital encounter of 05/24/25   ECG 12 Lead Chest Pain    Collection Time: 05/24/25 12:47 AM   Result Value Ref Range    QT Interval 350 ms    QTC Interval 449 ms   Comprehensive Metabolic Panel    Collection Time: 05/24/25 12:58 AM    Specimen: Blood   Result Value Ref Range    Glucose 124 (H) 65 - 99 mg/dL    BUN 14 6 - 20 mg/dL    Creatinine 0.88 0.76 - 1.27 mg/dL    Sodium 138 136 - 145 mmol/L    Potassium 3.7 3.5 - 5.2 mmol/L    Chloride 105 98 - 107 mmol/L    CO2 21.6 (L) 22.0 - 29.0 mmol/L    Calcium 8.4 (L) 8.6 - 10.5 mg/dL    Total Protein 6.8 6.0 - 8.5 g/dL    Albumin 3.7 3.5 - 5.2 g/dL    ALT (SGPT) 22 1 - 41 U/L    AST (SGOT) 29 1 - 40 U/L    Alkaline  Phosphatase 96 39 - 117 U/L    Total Bilirubin 0.2 0.0 - 1.2 mg/dL    Globulin 3.1 gm/dL    A/G Ratio 1.2 g/dL    BUN/Creatinine Ratio 15.9 7.0 - 25.0    Anion Gap 11.4 5.0 - 15.0 mmol/L    eGFR 103.5 >60.0 mL/min/1.73   Lipase    Collection Time: 05/24/25 12:58 AM    Specimen: Blood   Result Value Ref Range    Lipase 46 13 - 60 U/L   High Sensitivity Troponin T    Collection Time: 05/24/25 12:58 AM    Specimen: Blood   Result Value Ref Range    HS Troponin T 6 <22 ng/L   BNP    Collection Time: 05/24/25 12:58 AM    Specimen: Blood   Result Value Ref Range    proBNP 38.0 0.0 - 900.0 pg/mL   Magnesium    Collection Time: 05/24/25 12:58 AM    Specimen: Blood   Result Value Ref Range    Magnesium 1.7 1.6 - 2.6 mg/dL   CBC Auto Differential    Collection Time: 05/24/25 12:58 AM    Specimen: Blood   Result Value Ref Range    WBC 8.66 3.40 - 10.80 10*3/mm3    RBC 4.40 4.14 - 5.80 10*6/mm3    Hemoglobin 14.5 13.0 - 17.7 g/dL    Hematocrit 41.7 37.5 - 51.0 %    MCV 94.8 79.0 - 97.0 fL    MCH 33.0 26.6 - 33.0 pg    MCHC 34.8 31.5 - 35.7 g/dL    RDW 12.8 12.3 - 15.4 %    RDW-SD 44.3 37.0 - 54.0 fl    MPV 9.7 6.0 - 12.0 fL    Platelets 158 140 - 450 10*3/mm3    Neutrophil % 53.3 42.7 - 76.0 %    Lymphocyte % 31.2 19.6 - 45.3 %    Monocyte % 13.3 (H) 5.0 - 12.0 %    Eosinophil % 1.8 0.3 - 6.2 %    Basophil % 0.2 0.0 - 1.5 %    Immature Grans % 0.2 0.0 - 0.5 %    Neutrophils, Absolute 4.61 1.70 - 7.00 10*3/mm3    Lymphocytes, Absolute 2.70 0.70 - 3.10 10*3/mm3    Monocytes, Absolute 1.15 (H) 0.10 - 0.90 10*3/mm3    Eosinophils, Absolute 0.16 0.00 - 0.40 10*3/mm3    Basophils, Absolute 0.02 0.00 - 0.20 10*3/mm3    Immature Grans, Absolute 0.02 0.00 - 0.05 10*3/mm3    nRBC 0.0 0.0 - 0.2 /100 WBC   Green Top (Gel)    Collection Time: 05/24/25 12:58 AM   Result Value Ref Range    Extra Tube Hold for add-ons.    Lavender Top    Collection Time: 05/24/25 12:58 AM   Result Value Ref Range    Extra Tube hold for add-on    Gold Top - SST     Collection Time: 05/24/25 12:58 AM   Result Value Ref Range    Extra Tube Hold for add-ons.    Light Blue Top    Collection Time: 05/24/25 12:58 AM   Result Value Ref Range    Extra Tube Hold for add-ons.    Urine Drug Screen - Urine, Clean Catch    Collection Time: 05/24/25  2:06 AM    Specimen: Urine, Clean Catch   Result Value Ref Range    THC, Screen, Urine Negative Negative    Phencyclidine (PCP), Urine Negative Negative    Cocaine Screen, Urine Negative Negative    Methamphetamine, Ur Negative Negative    Opiate Screen Negative Negative    Amphetamine Screen, Urine Positive (A) Negative    Benzodiazepine Screen, Urine Positive (A) Negative    Tricyclic Antidepressants Screen Negative Negative    Methadone Screen, Urine Negative Negative    Barbiturates Screen, Urine Positive (A) Negative    Oxycodone Screen, Urine Negative Negative    Buprenorphine, Screen, Urine Negative Negative   High Sensitivity Troponin T 1Hr    Collection Time: 05/24/25  2:06 AM    Specimen: Blood   Result Value Ref Range    HS Troponin T 7 <22 ng/L    Troponin T Numeric Delta 1 Abnormal if >/=3 ng/L   Fentanyl, Urine - Urine, Clean Catch    Collection Time: 05/24/25  2:06 AM    Specimen: Urine, Clean Catch   Result Value Ref Range    Fentanyl, Urine Negative Negative   Urinalysis With Microscopic If Indicated (No Culture) - Urine, Clean Catch    Collection Time: 05/24/25  2:08 AM    Specimen: Urine, Clean Catch   Result Value Ref Range    Color, UA Yellow Yellow, Straw    Appearance, UA Clear Clear    pH, UA 6.5 5.0 - 8.0    Specific Gravity, UA 1.010 1.005 - 1.030    Glucose, UA Negative Negative    Ketones, UA Negative Negative    Bilirubin, UA Negative Negative    Blood, UA Negative Negative    Protein, UA Negative Negative    Leuk Esterase, UA Negative Negative    Nitrite, UA Negative Negative    Urobilinogen, UA 1.0 E.U./dL 0.2 - 1.0 E.U./dL     *Note: Due to a large number of results and/or encounters for the requested time  "period, some results have not been displayed. A complete set of results can be found in Results Review.     CT Angiogram Chest Pulmonary Embolism  Result Date: 5/24/2025   Normal heart size. No thoracic aortic aneurysm or dissection. No pulmonary embolus. No lobar consolidation or edema. No pleural effusion or pneumothorax. Normal thyroid gland. No mediastinal or hilar adenopathy. Mild elevated right hemidiaphragm. Cholecystectomy. Fatty infiltration of the liver.  This report was finalized on 5/24/2025 5:06 AM by Marco Nieto MD.      XR Chest 1 View  Result Date: 5/24/2025   Mild enlarged heart size. Hypoinflated lungs Coarsened bronchovascular pattern to the lungs No edema or pneumonia. No pleural effusion or pneumothorax   This report was finalized on 5/24/2025 3:56 AM by Marco Nieto MD.          Procedures           ED Course  ED Course as of 05/24/25 0647   Sat May 24, 2025   0047 Patient received 325 ASA prior to arrival via EMS. [KH]   0233 ECG demonstrates normal sinus rhythm at a rate of 99 bpm.  TN and QTc interval are normal as is QRS duration.  There are no acute ST-T wave changes. [RA]      ED Course User Index  [KH] Makenna Dobbs, APRN  [RA] Marco Pimentel MD                  HEART Score: 2   Shared Decision Making  I discussed the findings with the patient/patient representative who is in agreement with the treatment plan and the final disposition.  Risks and benefits of discharge and/or observation/admission were discussed: Yes                                      Medical Decision Making  Patient is a 54-year-old male who presents today with complaints of chest pain.  He reports that earlier prior to arrival he was talking to his ex-wife on the phone when he began having a \"hot burning knife type feeling\" to his sternum area.  He denies any associated shortness of breath.  He denies cough, congestion, or any other complaints.  He denies nausea, vomiting, abdominal pain, weakness, " numbness, or any other complaints.  He is awake, alert, no acute distress upon his arrival.  He presents EMS from his home.    Problems Addressed:  Chest pain, unspecified type: complicated acute illness or injury    Amount and/or Complexity of Data Reviewed  Labs: ordered.  Radiology: ordered.  ECG/medicine tests: ordered.    Risk  OTC drugs.  Prescription drug management.        Final diagnoses:   Chest pain, unspecified type       ED Disposition  ED Disposition       ED Disposition   Discharge    Condition   Stable    Comment   --               Tiera Valenzuela, APRN  [address] [facility] [place] [address]  [phone]    Schedule an appointment as soon as possible for a visit   As needed    Leandro Daily MD  [address] [facility] [address]  [phone]    Schedule an appointment as soon as possible for a visit in 3 days      [facility] EMERGENCY DEPARTMENT  [address] [place] [id number] [phone]  Go to   If symptoms worsen         Medication List      No changes were made to your prescriptions during this visit.            Makenna Dobbs, APRN  05/24/25 0666

## 2025-05-26 LAB
QT INTERVAL: 350 MS
QTC INTERVAL: 449 MS

## 2025-05-27 ENCOUNTER — TELEPHONE (OUTPATIENT)
Dept: CARDIOLOGY | Facility: CLINIC | Age: 55
End: 2025-05-27
Payer: COMMERCIAL

## 2025-05-27 ENCOUNTER — SPECIALTY PHARMACY (OUTPATIENT)
Dept: PHARMACY | Facility: HOSPITAL | Age: 55
End: 2025-05-27
Payer: COMMERCIAL

## 2025-05-27 NOTE — TELEPHONE ENCOUNTER
Caller: Jaquan Mckay    Relationship to patient: Self    Best call back number: [phone]    Type of visit: HOSPITAL FU    Requested date: THREE DAYS FROM DISCHARGE     Additional notes:PATIENT HAS DISCHARGE NOTES FOR HOSPITAL FU. PLEASE CALL AND ADVISE.

## 2025-05-27 NOTE — PROGRESS NOTES
"   Medication Management Clinic/ Specialty Pharmacy Patient Management Program  Lipid Management Program - PCSK9i follow up Assessment     Jaquan Mckay is a 54 y.o. male referred by their provider, Tiera Valenzuela, to the Hyperlipidemia Patient Management program offered by [facility] for Lipid Management.  Jaquan Mckay is  treated for ASCVD and hyperlipidemia, and currently takes crestor 40 mg.  In the past, Pt has tried lipitor 40 mg, zocor 10 mg, pravastatin 80 mg and is not statin intolerant. The patient denies any allergies to latex.       A follow-up outreach was conducted, including assessment of continued therapy appropriateness, medication adherence, and side effect incidence and management for Repatha. Patient comes to clinic for injections and has not missed any doses.  Repatha was started on 10/29/24, and patient reports tolerating the medication well with no side effects.    He reports he tolerated last Repatha injection without any issues. He denies any side effects.     Changes to Insurance Coverage or Financial Support  [employer] [place] (no change)    Relevant Past Medical History and Comorbidities  Relevant medical history and concomitant health conditions were discussed with the patient. The patient's chart has been reviewed for relevant past medical history and comorbid health conditions and updated as necessary.   Past Medical History:   Diagnosis Date    Allergic     Anxiety     Arthritis     Asthma     Body piercing     REPORTS CYLICONE IN EARS    Clotting disorder 2004    had a knee surgery    Coronary artery disease     Depression     DVT (deep venous thrombosis)     RIGHT RIGHT KNEE AFTER SURGERY YEARS AGO IN 2001 OR 2004    Elevated cholesterol     Gastric ulcer     GERD (gastroesophageal reflux disease)     H/O migraine     Headache     Heart attack     REPORTS \"LIGHT HEART ATTACK A LONG TIME AGO\"  \"EARLY 90'S\"    History of " "seizures     REPORTS LAST EPISODE WAS AROUND 1995.    Hostility     Hyperlipidemia     Hypertension     Knee pain, acute     Left    Low back pain     Lyme disease     Migraine     MRSA (methicillin resistant Staphylococcus aureus)     REPORTS LAST TESTED + 2004. WAS TREATED HE REPORTS.  RIGHT ARM, RIGHT KNEE.    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     Obesity     Poor historian     [place] spotted fever     Seizures     Sleep apnea     Tattoo     Wears glasses      Social History     Socioeconomic History    Marital status:      Spouse name: Becca    Number of children: 2    Years of education: 12   Tobacco Use    Smoking status: Every Day     Current packs/day: 1.00     Average packs/day: 1 pack/day for 18.2 years (18.2 ttl pk-yrs)     Types: Cigars, Cigarettes     Start date: 4/11/2022     Passive exposure: Current    Smokeless tobacco: Never   Vaping Use    Vaping status: Never Used   Substance and Sexual Activity    Alcohol use: No    Drug use: No    Sexual activity: Defer     Partners: Female     Birth control/protection: None     Problem list reviewed by Kimberley Dinh RP on 5/27/2025 at  2:56 PM    Hospitalizations and Urgent Care Since Last Assessment  ED Visits, Admissions, or Hospitalizations: none  Urgent Office Visits: none    Allergies  Known allergies and reactions were discussed with the patient. The patient's chart has been reviewed for allergy information and updated as necessary.   Allergies   Allergen Reactions    Ciprofloxacin Anaphylaxis and Hives    Miralax [Polyethylene Glycol] Itching and Rash    Mobic [Meloxicam] Other (See Comments)     Pt states, \"It make my feet and hands go numb and I can't hardly walk.\"     Paxil [Paroxetine Hcl] Shortness Of Breath     Chest pain     Peanut-Containing Drug Products Anaphylaxis    Penicillins Anaphylaxis    Pristiq [Desvenlafaxine Succinate Er] Dizziness    Sulfa Antibiotics Anaphylaxis, Itching and Rash    Doxycycline Hives    Fish-Derived " Products Hives    Isosorbide Nitrate Rash     Rash, hives, had to use inhaler.     Lyrica [Pregabalin] Hives    Movantik [Naloxegol] Rash    Seroquel [Quetiapine] Hives and Rash    Trulance [Plecanatide] Hives    Buspar [Buspirone] Rash    Clarithromycin Rash    Clindamycin/Lincomycin Rash    Codeine Rash    Contrast Dye (Echo Or Unknown Ct/Mr) Itching and Rash    Diltiazem Rash    Elavil [Amitriptyline] Rash    Flomax [Tamsulosin] Hives    Gabapentin Rash    Iodinated Contrast Media Itching and Rash    Keflex [Cephalexin] Rash    Levocetirizine Rash    Linzess [Linaclotide] Rash    Metoprolol Rash    Prednisone Rash and Other (See Comments)     Face, feet, and legs go completely numb per patient    Robitussin Cough+ Chest Max St [Dextromethorphan-Guaifenesin] Itching    Shrimp (Diagnostic) Rash    Spironolactone Rash    Viibryd [Vilazodone Hcl] Itching and Rash    Zoloft [Sertraline Hcl] Hives and Itching     Allergies reviewed by Kimberley Dinh RP on 5/27/2025 at  2:53 PM  Allergies reviewed by Kimberley Dinh RPH on 5/27/2025 at  2:56 PM    Relevant Laboratory Values  Relevant laboratory values were discussed with the patient. The following specialty medication dose adjustment(s) are recommended: none    Lab Results   Component Value Date    GLUCOSE 124 (H) 05/24/2025    CALCIUM 8.4 (L) 05/24/2025     05/24/2025    K 3.7 05/24/2025    CO2 21.6 (L) 05/24/2025     05/24/2025    BUN 14 05/24/2025    CREATININE 0.88 05/24/2025    EGFRIFAFRI  08/26/2016      Comment:      <15 Indicative of kidney failure.    EGFRIFNONA 66 02/02/2022    BCR 15.9 05/24/2025    ANIONGAP 11.4 05/24/2025     Lab Results   Component Value Date    CHOL 144 01/31/2025    CHLPL 232 (H) 04/05/2016    TRIG 87 01/31/2025    HDL 64 (H) 01/31/2025    LDL 64 01/31/2025       Current Medication List  This medication list has been reviewed with the patient and evaluated for any interactions or necessary modifications/recommendations,  and updated to include all prescription medications, OTC medications, and supplements the patient is currently taking.  This list reflects what is contained in the patient's profile, which has also been marked as reviewed to communicate to other providers it is the most up to date version of the patient's current medication therapy.     Current Outpatient Medications:     albuterol sulfate  (90 Base) MCG/ACT inhaler, Inhale 2 puffs by mouth every 4 hours as needed., Disp: 18 g, Rfl: 3    Alcohol Swabs (Alcohol Prep) 70 % pads, Use 1 each 2 (Two) Times a Day., Disp: 60 each, Rfl: 5    aspirin (Aspirin EC Adult Low Dose) 81 MG EC tablet, Take 1 tablet by mouth Daily., Disp: 30 tablet, Rfl: 3    azelastine (ASTELIN) 0.1 % nasal spray, Administer 1 spray into the nostril(s) as directed by provider 2 (Two) Times a Day., Disp: 30 mL, Rfl: 5    cloNIDine (Catapres) 0.1 MG tablet, Take 1 tablet by mouth 2 (Two) Times a Day As Needed for High Blood Pressure (if bp more than 140/90)., Disp: 60 tablet, Rfl: 1    dexAMETHasone (DECADRON) 4 MG/ML injection, Use 1 mL at Physical Therapy for ionthophoresis., Disp: 40 mL, Rfl: 0    dexlansoprazole (Dexilant) 60 MG capsule, Take 1 capsule by mouth 2 (Two) Times a Day., Disp: 60 capsule, Rfl: 5    Diclofenac Sodium (VOLTAREN) 1 % gel gel, Apply 2 grams topically to the affect area as directed 4 (Four) Times a Day., Disp: 200 g, Rfl: 2    Dilantin 100 MG capsule, Take 2 capsules by mouth 2 (Two) Times a Day., Disp: 120 capsule, Rfl: 5    diphenhydrAMINE (Benadryl Allergy) 25 MG tablet, Take 1-2 tablets by mouth Every 8 (Eight) Hours As Needed for Itching (or rash)., Disp: 120 tablet, Rfl: 2    docusate calcium (SURFAK) 240 MG capsule, Take 1 capsule by mouth 2 (Two) Times a Day As Needed for Constipation., Disp: 60 capsule, Rfl: 2    Elastic Bandages & Supports (ACE Ankle Brace) misc, 1 each Daily., Disp: 1 each, Rfl: 0    EPINEPHrine (EPIPEN) 0.3 MG/0.3ML solution  auto-injector injection, Inject Intramuscularly into lateral thigh as needed for treatment of anaphylaxis, then go to the ER or call 911., Disp: 2 each, Rfl: 2    ergocalciferol (ERGOCALCIFEROL) 1.25 MG (03299 UT) capsule, Take 1 capsule by mouth 1 (One) Time Per Week., Disp: 4 capsule, Rfl: 5    Evolocumab (REPATHA) solution auto-injector SureClick injection, Inject 1 mL under the skin into the appropriate area as directed Every 14 (Fourteen) Days., Disp: 2 mL, Rfl: 5    fluticasone (FLONASE) 50 MCG/ACT nasal spray, Use 1 spray in each nostril Twice a day for 30 days, Disp: 16 g, Rfl: 11    fluticasone (Flovent HFA) 110 MCG/ACT inhaler, Inhale 1 puff by mouth 2 (Two) Times a Day., Disp: 12 g, Rfl: 5    fluticasone (FLOVENT HFA) 44 MCG/ACT inhaler, Inhale 1 puff by mouth twice a day., Disp: 10.6 g, Rfl: 5    glucose blood (OneTouch Ultra) test strip, Use as instructed 2 times daily and as needed, Disp: 100 each, Rfl: 3    HYDROcodone-acetaminophen (NORCO) 5-325 MG per tablet, Take 1 tablet by mouth Every 8 (Eight) Hours for 10 days  (max daily dose of 3 tablets)., Disp: 30 tablet, Rfl: 0    HYDROcodone-acetaminophen (Norco) 7.5-325 MG per tablet, Take 1 tablet by mouth every 6 (six) hours as needed for pain for up to 10 days. Max Daily Amount: 4 tablets, Disp: 25 tablet, Rfl: 0    ibuprofen (ADVIL,MOTRIN) 800 MG tablet, Take 1 tablet by mouth 2 (Two) Times a Day As Needed., Disp: 40 tablet, Rfl: 0    ibuprofen (ADVIL,MOTRIN) 800 MG tablet, Take 1 tablet by mouth Every 6 (Six) Hours As Needed., Disp: 40 tablet, Rfl: 1    ipratropium-albuterol (Combivent Respimat)  MCG/ACT inhaler, INHALE 1 PUFF BY MOUTH 4 (FOUR) TIMES A DAY AS NEEDED FOR WHEEZING., Disp: 4 g, Rfl: 5    ipratropium-albuterol (DUO-NEB) 0.5-2.5 mg/3 ml nebulizer, Inhale the contents of 3 mL vial nebulizer every 6 hours as needed., Disp: 360 mL, Rfl: 3    Lancets (OneTouch Delica Plus [id number]) misc, Use as instructed 2 times daily and as needed,  Disp: 100 each, Rfl: 3    levocetirizine (XYZAL) 5 MG tablet, Take 1 tablet by mouth in the evening Once a day 30 days, Disp: 30 tablet, Rfl: 11    lisinopril (PRINIVIL,ZESTRIL) 30 MG tablet, Take 1 tablet by mouth Daily for blood pressure., Disp: 90 tablet, Rfl: 1    loratadine (CLARITIN) 10 MG tablet, Take 1 tablet by mouth Daily As Needed for Allergies., Disp: 30 tablet, Rfl: 5    methocarbamol (ROBAXIN) 750 MG tablet, Take 1 tablet by mouth 3 (Three) Times a Day., Disp: 90 tablet, Rfl: 0    mirtazapine (REMERON) 30 MG tablet, Take 1 & 1/2  tablets by mouth Every Night., Disp: 45 tablet, Rfl: 5    montelukast (SINGULAIR) 10 MG tablet, Take 1 tablet by mouth Every Night., Disp: 90 tablet, Rfl: 2    mupirocin (BACTROBAN) 2 % ointment, Apply 1 Application topically to the appropriate area as directed 3 (Three) Times a Day., Disp: 30 g, Rfl: 0    ondansetron (Zofran) 4 MG tablet, Take 1 tablet by mouth Every 8 (Eight) Hours As Needed for Nausea., Disp: 20 tablet, Rfl: 0    phentermine (Adipex-P) 37.5 MG tablet, Take 1 tablet by mouth Every Morning Before Breakfast., Disp: 30 tablet, Rfl: 0    rosuvastatin (Crestor) 40 MG tablet, Take 1 tablet by mouth Daily., Disp: 30 tablet, Rfl: 5    sodium chloride 0.65 % nasal spray, Use 1-3 sprays in each nostril Three times a day as needed 30 days, Disp: 44 mL, Rfl: 11    tamsulosin (FLOMAX) 0.4 MG capsule 24 hr capsule, Take 1 capsule by mouth Daily., Disp: 90 capsule, Rfl: 3    temazepam (RESTORIL) 30 MG capsule, Take 1 capsule by mouth At Night As Needed for Sleep., Disp: 30 capsule, Rfl: 1    vitamin C (ASCORBIC ACID) 500 MG tablet, Take 1 tablet by mouth 2 (Two) Times a Day., Disp: 60 tablet, Rfl: 5  No current facility-administered medications for this visit.    Medicines reviewed by Kimberley Dinh RPH on 5/27/2025 at  2:56 PM    Drug Interactions  No significant drug-drug interactions with Repatha according to literature.     Adverse Drug Reactions        Plan for ADR  Management: n/a    Adherence, Self-Administration, and Current Therapy Problems  Adherence related to the patient's specialty therapy was discussed with the patient. The Adherence segment of this outreach has been reviewed and updated.          Additional Barriers to Patient Self-Administration: patient refuses to administer himself  Methods for Supporting Patient Self-Administration: patient comes to [facility] clinic every 2 weeks for injections    Open Medication Therapy Problems  No medication therapy recommendations to display    Goals of Therapy  Goals related to the patient's specialty therapy were discussed with the patient. The Patient Goals segment of this outreach has been reviewed and updated.   Goals Addressed Today        Specialty Pharmacy General Goal      LDL < 70 mg/dl    4/8/25 [place]: LDL reduced from 132 to 64 mg/dl from lipid panel 1/31/25 (at goal)          LDL was 136 mg/dl on 3/20/24 and has improved to 132 mg/ml on 10/17/24. This lab work was completed prior to starting repatha therapy.    Quality of Life Assessment   Quality of Life related to the patient's enrollment in the patient management program and services provided was discussed with the patient. The QOL segment of this outreach has been reviewed and updated.       Reassessment Plan & Follow-Up  1.Medication Therapy Changes: Patient will continue Repatha 140mg every 2 weeks    A. I administered injection in to the back of his RIGHT arm   2. Related Plans, Therapy Recommendations, or Issues to Be Addressed: none  3. Pharmacist to perform regular assessments no more than (6) months from the previous assessment.  Patient will continue regular follow-up with referring provider.   4. Care Coordinator to set up future refill outreaches, coordinate prescription delivery, and escalate clinical questions to pharmacist.      Attestation      I attest the patient was actively involved in and has agreed to the above plan of care.  If the prescribed  therapy is at any point deemed not appropriate based on the current or future assessments, a consultation will be initiated with the patient's specialty care provider to determine the best course of action. The revised plan of therapy will be documented along with any required assessments and/or additional patient education provided.     Patient to follow up for injections in Clinic every 2 weeks    Kimberley Dinh RPH  5/27/2025  14:56 EDT

## 2025-06-04 ENCOUNTER — OFFICE VISIT (OUTPATIENT)
Dept: FAMILY MEDICINE CLINIC | Facility: CLINIC | Age: 55
End: 2025-06-04
Payer: COMMERCIAL

## 2025-06-04 VITALS
DIASTOLIC BLOOD PRESSURE: 86 MMHG | WEIGHT: 236.8 LBS | OXYGEN SATURATION: 98 % | BODY MASS INDEX: 37.17 KG/M2 | RESPIRATION RATE: 16 BRPM | SYSTOLIC BLOOD PRESSURE: 122 MMHG | HEART RATE: 88 BPM | HEIGHT: 67 IN

## 2025-06-04 DIAGNOSIS — F41.9 ANXIETY: ICD-10-CM

## 2025-06-04 DIAGNOSIS — E78.2 MIXED HYPERLIPIDEMIA: ICD-10-CM

## 2025-06-04 DIAGNOSIS — E53.8 VITAMIN B12 DEFICIENCY: ICD-10-CM

## 2025-06-04 DIAGNOSIS — F41.8 DEPRESSION WITH ANXIETY: ICD-10-CM

## 2025-06-04 DIAGNOSIS — I10 ESSENTIAL HYPERTENSION: Primary | ICD-10-CM

## 2025-06-04 DIAGNOSIS — E16.2 HYPOGLYCEMIA: ICD-10-CM

## 2025-06-04 DIAGNOSIS — E55.9 VITAMIN D DEFICIENCY: ICD-10-CM

## 2025-06-04 DIAGNOSIS — R56.9 SEIZURES: ICD-10-CM

## 2025-06-04 RX ORDER — VONOPRAZAN FUMARATE 26.72 MG/1
20 TABLET ORAL DAILY
COMMUNITY

## 2025-06-04 NOTE — PROGRESS NOTES
"Subjective   Jaquan Mckay is a 54 y.o. male.     Chief Complaint   Patient presents with    Hypertension       Hypertension  Associated symptoms: chest pain and shortness of breath    Associated symptoms: no palpitations and no dizziness      Hospital follow up-on 05/24/2025 he went via EMS due to a hot burning pain in his chest that felt like a knife.  He reports that it progressed and radiated down his right arm.  He felt that he had increasing SOA.  He has a cardio follow up this week due to his symptoms.  He has not had any further CP.   He reports he was not exerting and it was the end of the day.  He denies stressors.   GI follow up-\"no polyps\".  Biopsies were \"Good\".  He is followed by [facility].  He was given samples of  Voquezna 20 mg and he is to take it daily.   He was also ordered xifaxin for diarrhea.  He will follow up in 8 weeks (July 17).  He had some inflammation in his stomach.  He was advised to continue Dexilant.    Foot problem-on the right.  He is in a brace today.  He did see his foot provider yesterday.  He is out of visits per his insurance but ortho does wish him to continue.  He has noted some knee pain due to use of knee scooter.    HTN-continues to be stable.  No current CP.  He denies any palpitations or dizziness.  No HA.  Chronic allergic rhinitis-with history of asthma.  He reports that he continues to take his immunotherapy.  He is followed by Francisca Augustine.  He reports he is currently getting an injection every 4 weeks.  He is tolerating this well.  He continues Singulair 10 mg.  He is also on Xyzal 5 mg.  He does not tolerate nasal sprays well due to epistaxis with overuse.  Seizure disorder-has been stable on Dilantin.  He reports that he is not having any negative side effects of medication.  He is due for an updated phenytoin level    The following portions of the patient's history were reviewed and updated as appropriate: CC, ROS, allergies, current medications, " "past family history, past medical history, past social history, past surgical history and problem list.      Review of Systems   Constitutional:  Positive for fatigue. Negative for appetite change, unexpected weight gain and unexpected weight loss.   HENT:  Negative for congestion, ear pain, postnasal drip, rhinorrhea, sore throat, swollen glands, trouble swallowing and voice change.    Eyes:  Negative for pain and visual disturbance.   Respiratory:  Positive for shortness of breath. Negative for cough, chest tightness and wheezing.    Cardiovascular:  Positive for chest pain. Negative for palpitations and leg swelling.   Gastrointestinal:  Positive for diarrhea. Negative for abdominal pain, blood in stool, constipation, nausea and indigestion.   Genitourinary:  Negative for dysuria, hematuria and urgency.   Musculoskeletal:  Positive for arthralgias, back pain, gait problem and myalgias. Negative for joint swelling.   Skin:  Negative for color change and skin lesions.   Allergic/Immunologic: Negative.    Neurological:  Negative for dizziness, numbness and headache.   Hematological: Negative.    Psychiatric/Behavioral:  Positive for dysphoric mood and sleep disturbance. Negative for suicidal ideas. The patient is not nervous/anxious.    All other systems reviewed and are negative.      Objective     /86   Pulse 88   Resp 16   Ht 170.2 cm (67\")   Wt 107 kg (236 lb 12.8 oz)   SpO2 98%   BMI 37.09 kg/m²     Physical Exam  Vitals reviewed.   Constitutional:       General: He is not in acute distress.     Appearance: He is well-developed. He is obese. He is not diaphoretic.   HENT:      Head: Normocephalic and atraumatic.      Jaw: No tenderness.      Right Ear: Hearing, tympanic membrane, ear canal and external ear normal.      Left Ear: Hearing, tympanic membrane, ear canal and external ear normal.      Nose: Nose normal. No nasal tenderness or congestion.      Right Sinus: No maxillary sinus tenderness or " frontal sinus tenderness.      Left Sinus: No maxillary sinus tenderness or frontal sinus tenderness.      Mouth/Throat:      Lips: Pink.      Mouth: Mucous membranes are moist.      Pharynx: Oropharynx is clear. Uvula midline.   Eyes:      General: Lids are normal. No scleral icterus.     Extraocular Movements:      Right eye: Normal extraocular motion and no nystagmus.      Left eye: Normal extraocular motion and no nystagmus.      Conjunctiva/sclera: Conjunctivae normal.      Pupils: Pupils are equal, round, and reactive to light.   Neck:      Thyroid: No thyromegaly or thyroid tenderness.      Vascular: No carotid bruit or JVD.      Trachea: No tracheal tenderness.   Cardiovascular:      Rate and Rhythm: Normal rate and regular rhythm.      Pulses:           Dorsalis pedis pulses are 2+ on the right side and 2+ on the left side.        Posterior tibial pulses are 2+ on the right side and 2+ on the left side.      Heart sounds: Normal heart sounds, S1 normal and S2 normal. No murmur heard.  Pulmonary:      Effort: Pulmonary effort is normal. No accessory muscle usage, prolonged expiration or respiratory distress.      Breath sounds: Normal breath sounds.   Chest:      Chest wall: No tenderness.   Abdominal:      General: Bowel sounds are normal. There is no distension.      Palpations: Abdomen is soft. There is no hepatomegaly, splenomegaly or mass.      Tenderness: There is no abdominal tenderness.   Musculoskeletal:         General: Tenderness present.      Cervical back: Normal range of motion and neck supple.      Right lower leg: No edema.      Left lower leg: No edema.      Comments: No muscular atrophy or flaccidity.   Lymphadenopathy:      Head:      Right side of head: No submental or submandibular adenopathy.      Left side of head: No submental or submandibular adenopathy.      Cervical: No cervical adenopathy.      Right cervical: No superficial cervical adenopathy.     Left cervical: No superficial  cervical adenopathy.   Skin:     General: Skin is warm and dry.      Capillary Refill: Capillary refill takes less than 2 seconds.      Coloration: Skin is not jaundiced or pale.      Findings: No erythema.      Nails: There is no clubbing.   Neurological:      Mental Status: He is alert and oriented to person, place, and time.      Cranial Nerves: No cranial nerve deficit or facial asymmetry.      Sensory: No sensory deficit.      Motor: No weakness, tremor, atrophy or abnormal muscle tone.      Coordination: Coordination normal.      Gait: Gait abnormal (mild antalgia).      Deep Tendon Reflexes: Reflexes are normal and symmetric.   Psychiatric:         Attention and Perception: He is attentive.         Mood and Affect: Mood normal. Mood is not anxious or depressed.         Speech: Speech normal.         Behavior: Behavior normal. Behavior is cooperative.         Thought Content: Thought content normal.         Cognition and Memory: Cognition normal.         Judgment: Judgment normal.         Diagnoses and all orders for this visit:    1. Essential hypertension (Primary)  Assessment & Plan:  Continue lisinopril 30 mg.  Continue under the care of cardiology.  Ambulatory BP monitoring either at home or random community checks.  Patient to report continued elevations >140/90.  Patient may come by office for checks if needed.       Orders:  -     CBC & Differential  -     Comprehensive Metabolic Panel    2. Mixed hyperlipidemia  Assessment & Plan:  Continue Crestor 40 mg  and continue Repatha injections  We will continue to monitor Lipid panel    Orders:  -     Lipid Panel    3. Seizures  Assessment & Plan:  Continue Dilantin  Report any new symptoms.  Report any side effects of medications    Orders:  -     Phenytoin level, total  -     Phenytoin level, free    4. Anxiety  Assessment & Plan:  Continue Remeron.    Continue restoril.    Trial of Elavil.  Continue under care of comp care    Orders:  -     TSH  -     T4,  Free    5. Vitamin B12 deficiency  -     Vitamin B12    6. Depression with anxiety    7. Vitamin D deficiency  Assessment & Plan:  Continue vitamin D as directed.  Report any negative side effects.  We will continue to monitor vitamin D labs and adjust supplement if necessary.      Orders:  -     Vitamin D,25-Hydroxy    8. Hypoglycemia  -     Hemoglobin A1c      Understands disease processes and need for medications.  Understands reasons for urgent and emergent care.  Patient (& family) verbalized agreement for treatment plan.   Emotional support and active listening provided.  Patient provided time to verbalize feelings.    Labs ordered.  Patient will obtain at the hospital.    RTC 1 month, sooner if needed.         This document has been electronically signed by:  BALDOMERO Thao, FNP-C    Dragon disclaimer:  Part of this note may be an electronic transcription/translation of spoken language to printed text using the Dragon Dictation System.

## 2025-06-04 NOTE — ASSESSMENT & PLAN NOTE
Continue Dilantin  Report any new symptoms.  Report any side effects of medications   SUBJECTIVE:                                                    Char Berg is a 5 year old female who presents to clinic today with grandmother because of:    Chief Complaint   Patient presents with     Throat Problem        HPI:  ENT Symptoms             Symptoms: cc Present Absent Comment   Fever/Chills   x    Fatigue   x    Muscle Aches   x    Eye Irritation   x    Sneezing  x     Nasal Georges/Drg  x  Clear drainage from nose   Sinus Pressure/Pain   x    Loss of smell   x    Dental pain   x    Sore Throat   x    Swollen Glands   x    Ear Pain/Fullness   x    Cough  x  Occasional cough   Wheeze   x    Chest Pain   x    Shortness of breath   x    Rash   x    Other   x      Symptom duration:  2 days    Symptom severity:     Treatments tried:  Dimetapp    Contacts:  mom dx with strep today     Cold symptoms started yesterday.  Grandmother gave Dimetapp last evening and Char slept well last night.  Appetite and energy level have been normal.  No skin rashes.  Sister has fever and vomited this morning and mother was diagnosed with strep pharyngitis today.    ROS:  Negative for constitutional, eye, ear, nose, throat, skin, respiratory, cardiac, and gastrointestinal other than those outlined in the HPI.    PROBLEM LIST:  Patient Active Problem List    Diagnosis Date Noted     Expressive language delay 2014     Priority: Medium     Labial adhesions 2014     Priority: Medium     Kendall's tubercle 2013     Priority: Medium      infant, 2,500 or more grams 2013     Priority: Medium      MEDICATIONS:  Current Outpatient Prescriptions   Medication Sig Dispense Refill     estradiol (ESTRACE VAGINAL) 0.1 MG/GM vaginal cream Place a small amount along adhesion twice daily for up to 6 weeks. 42.5 g 0     conjugated estrogens (PREMARIN) vaginal cream Apply minimal amount to adhesions twice daily until resolved 30 g 0     oseltamivir (TAMIFLU) 30 MG capsule Take 1 capsule (30 mg) by mouth 2 times daily  for 5 days 10 capsule 0     CHILDRENS ACETAMINOPHEN PO         ALLERGIES:  No Known Allergies    Problem list and histories reviewed & adjusted, as indicated.    OBJECTIVE:                                                      There were no vitals taken for this visit.   No blood pressure reading on file for this encounter.    GENERAL: Active, alert, in no acute distress.  SKIN: Clear. No significant rash, abnormal pigmentation or lesions  HEAD: Normocephalic.  EYES:  No discharge or erythema. Normal pupils and EOM.  EARS: Normal canals. Tympanic membranes are normal; gray and translucent.  NOSE: congested with cloudy discharge  MOUTH/THROAT: Clear. No oral lesions. Teeth intact without obvious abnormalities.  NECK: Supple, no masses.  LYMPH NODES: tonsillar lymph nodes are palpable  LUNGS: Clear. No rales, rhonchi, wheezing or retractions  HEART: Regular rhythm. Normal S1/S2. No murmurs.  ABDOMEN: Soft, non-tender, not distended, no masses or hepatosplenomegaly. Bowel sounds normal.     DIAGNOSTICS: Rapid strep Ag:  positive    ASSESSMENT/PLAN:                                                    (J02.0) Acute streptococcal pharyngitis  (primary encounter diagnosis)  Plan: amoxicillin (AMOXIL) 400 MG/5ML suspension        Discussed period of contagiousness and ways to limit the spread of strep pharyngitis.  Emphasized importance on completing full course of antibiotics.    (Z20.818) Exposure to strep throat  Plan: Strep, Rapid Screen      FOLLOW UP: If not improving or if worsening    SID Olivera CNP

## 2025-06-05 ENCOUNTER — TRANSCRIBE ORDERS (OUTPATIENT)
Dept: PHYSICAL THERAPY | Facility: HOSPITAL | Age: 55
End: 2025-06-05
Payer: COMMERCIAL

## 2025-06-05 ENCOUNTER — OFFICE VISIT (OUTPATIENT)
Dept: CARDIOLOGY | Facility: CLINIC | Age: 55
End: 2025-06-05
Payer: COMMERCIAL

## 2025-06-05 VITALS
DIASTOLIC BLOOD PRESSURE: 86 MMHG | SYSTOLIC BLOOD PRESSURE: 139 MMHG | HEIGHT: 67 IN | WEIGHT: 237.4 LBS | HEART RATE: 88 BPM | OXYGEN SATURATION: 99 % | BODY MASS INDEX: 37.26 KG/M2

## 2025-06-05 DIAGNOSIS — M25.561 ACUTE PAIN OF RIGHT KNEE: ICD-10-CM

## 2025-06-05 DIAGNOSIS — R07.2 PRECORDIAL PAIN: Primary | ICD-10-CM

## 2025-06-05 DIAGNOSIS — M25.572 ACUTE BILATERAL ANKLE PAIN: Primary | ICD-10-CM

## 2025-06-05 DIAGNOSIS — I10 ESSENTIAL HYPERTENSION: ICD-10-CM

## 2025-06-05 DIAGNOSIS — M25.571 ACUTE BILATERAL ANKLE PAIN: Primary | ICD-10-CM

## 2025-06-05 DIAGNOSIS — Z72.0 TOBACCO ABUSE: ICD-10-CM

## 2025-06-05 PROCEDURE — 3079F DIAST BP 80-89 MM HG: CPT | Performed by: NURSE PRACTITIONER

## 2025-06-05 PROCEDURE — 3075F SYST BP GE 130 - 139MM HG: CPT | Performed by: NURSE PRACTITIONER

## 2025-06-05 PROCEDURE — 1160F RVW MEDS BY RX/DR IN RCRD: CPT | Performed by: NURSE PRACTITIONER

## 2025-06-05 PROCEDURE — 99214 OFFICE O/P EST MOD 30 MIN: CPT | Performed by: NURSE PRACTITIONER

## 2025-06-05 PROCEDURE — 1159F MED LIST DOCD IN RCRD: CPT | Performed by: NURSE PRACTITIONER

## 2025-06-05 NOTE — LETTER
June 5, 2025     BALDOMERO Thao  [address] [facility] [address]    Patient: Jaquan Mckay   YOB: 1970   Date of Visit: 6/5/2025     Dear BALDOMERO Thao:       Thank you for referring Jaquan Mckay to me for evaluation. Below are the relevant portions of my assessment and plan of care.    If you have questions, please do not hesitate to call me. I look forward to following Jaquan along with you.         Sincerely,        BALDOMERO Horowitz        CC: No Recipients    Virginie Garcia APRN  06/05/25 1147  Sign when Signing Visit  Tiera Valenzuela APRN  Jaquan Mckay  1970 06/05/2025    Patient Active Problem List   Diagnosis   • Gastroesophageal reflux disease without esophagitis   • Essential hypertension   • Seizures   • Hyperlipidemia   • Anxiety   • Varicocele present on ultrasound of scrotum   • Chronic bilateral low back pain with left-sided sciatica   • Seasonal allergic rhinitis due to pollen   • Mild persistent asthma without complication   • Precordial pain   • Burning with urination   • Congenital coronary artery anomaly   • BMI 35.0-35.9,adult   • Chondromalacia, knee   • Achilles tendinitis of both lower extremities   • Muscle spasm of both lower legs   • Personal history of allergy to shellfish   • Sensation of cold in lower extremity   • Varicose vein of leg   • Heart palpitations   • Generalized anxiety disorder   • Chronic elbow pain, right   • Unstable angina   • ASCVD (arteriosclerotic cardiovascular disease)   • Other constipation   • Class 2 severe obesity due to excess calories with serious comorbidity and body mass index (BMI) of 36.0 to 36.9 in adult   • Bacteremia   • Therapeutic opioid-induced constipation (OIC)   • Rectal bleeding   • Bloating   • History of colon polyps   • Lateral epicondylitis of right elbow   • Psychophysiological insomnia   • Chronic rhinitis   • Vitamin D deficiency   • Renal calculus   • Chronic idiopathic constipation   •  "Diarrhea   • Bilateral flank pain   • BPH without obstruction/lower urinary tract symptoms   • Incomplete bladder emptying   • Frequency of urination   • Mixed stress and urge urinary incontinence   • History of kidney stones   • Benign prostatic hyperplasia (BPH) with straining on urination   • Peroneal tendon tear, right, sequela   • Nausea   • Gastro-esophageal reflux disease with esophagitis   • Bilateral carpal tunnel syndrome       Tiera Arreaga APRN:    Subjective    Chief Complaint   Patient presents with   • Hospital Follow Up Visit     [facility] ER         History of Present Illness:    Jaquan Mckay is a 54 y.o. male with a past medical history of chronic chest pains and hypertension.  He presents today for cardiology follow-up.  Recently, he had developed burning pain in his left chest and arm.  [facility] went to [facility] ED.  High-sensitivity troponin was normal x 2.  EKG revealed normal sinus rhythm with no acute ischemic changes.  He reports prior to this episode he had not been having any chest pains and has not had any chest pain since this time.  His blood pressure has been well-controlled.  He does have severe allergy to contrast dye.  In addition, he has been taking Adipex and is asking if this is okay from a cardiac standpoint.    He had a stress test in June 2024 which was negative for evidence of myocardial ischemia.  An echocardiogram in August 2024 revealed normal LVEF with no wall motion abnormalities.  Cardiac cath in 2018 revealed minimal coronary artery disease.      Allergies   Allergen Reactions   • Ciprofloxacin Anaphylaxis and Hives   • Miralax [Polyethylene Glycol] Itching and Rash   • Mobic [Meloxicam] Other (See Comments)     Pt states, \"It make my feet and hands go numb and I can't hardly walk.\"    • Paxil [Paroxetine Hcl] Shortness Of Breath     Chest pain    • Peanut-Containing Drug Products Anaphylaxis   • Penicillins Anaphylaxis   • Pristiq [Desvenlafaxine Succinate " Er] Dizziness   • Sulfa Antibiotics Anaphylaxis, Itching and Rash   • Doxycycline Hives   • Fish-Derived Products Hives   • Isosorbide Nitrate Rash     Rash, hives, had to use inhaler.    • Lyrica [Pregabalin] Hives   • Movantik [Naloxegol] Rash   • Seroquel [Quetiapine] Hives and Rash   • Trulance [Plecanatide] Hives   • Buspar [Buspirone] Rash   • Clarithromycin Rash   • Clindamycin/Lincomycin Rash   • Codeine Rash   • Contrast Dye (Echo Or Unknown Ct/Mr) Itching and Rash   • Diltiazem Rash   • Elavil [Amitriptyline] Rash   • Flomax [Tamsulosin] Hives   • Gabapentin Rash   • Iodinated Contrast Media Itching and Rash   • Keflex [Cephalexin] Rash   • Levocetirizine Rash   • Linzess [Linaclotide] Rash   • Metoprolol Rash   • Prednisone Rash and Other (See Comments)     Face, feet, and legs go completely numb per patient   • Robitussin Cough+ Chest Max St [Dextromethorphan-Guaifenesin] Itching   • Shrimp (Diagnostic) Rash   • Spironolactone Rash   • Viibryd [Vilazodone Hcl] Itching and Rash   • Zoloft [Sertraline Hcl] Hives and Itching   :      Current Outpatient Medications:   •  albuterol sulfate  (90 Base) MCG/ACT inhaler, Inhale 2 puffs by mouth every 4 hours as needed., Disp: 18 g, Rfl: 3  •  Alcohol Swabs (Alcohol Prep) 70 % pads, Use 1 each 2 (Two) Times a Day., Disp: 60 each, Rfl: 5  •  aspirin (Aspirin EC Adult Low Dose) 81 MG EC tablet, Take 1 tablet by mouth Daily., Disp: 30 tablet, Rfl: 3  •  azelastine (ASTELIN) 0.1 % nasal spray, Administer 1 spray into the nostril(s) as directed by provider 2 (Two) Times a Day., Disp: 30 mL, Rfl: 5  •  cloNIDine (Catapres) 0.1 MG tablet, Take 1 tablet by mouth 2 (Two) Times a Day As Needed for High Blood Pressure (if bp more than 140/90)., Disp: 60 tablet, Rfl: 1  •  dexAMETHasone (DECADRON) 4 MG/ML injection, Use 1 mL at Physical Therapy for ionthophoresis., Disp: 40 mL, Rfl: 0  •  dexlansoprazole (Dexilant) 60 MG capsule, Take 1 capsule by mouth 2 (Two) Times a  Day., Disp: 60 capsule, Rfl: 5  •  Diclofenac Sodium (VOLTAREN) 1 % gel gel, Apply 2 grams topically to the affect area as directed 4 (Four) Times a Day., Disp: 200 g, Rfl: 2  •  Dilantin 100 MG capsule, Take 2 capsules by mouth 2 (Two) Times a Day., Disp: 120 capsule, Rfl: 5  •  diphenhydrAMINE (Benadryl Allergy) 25 MG tablet, Take 1-2 tablets by mouth Every 8 (Eight) Hours As Needed for Itching (or rash)., Disp: 120 tablet, Rfl: 2  •  docusate calcium (SURFAK) 240 MG capsule, Take 1 capsule by mouth 2 (Two) Times a Day As Needed for Constipation., Disp: 60 capsule, Rfl: 2  •  Elastic Bandages & Supports (ACE Ankle Brace) misc, 1 each Daily., Disp: 1 each, Rfl: 0  •  EPINEPHrine (EPIPEN) 0.3 MG/0.3ML solution auto-injector injection, Inject Intramuscularly into lateral thigh as needed for treatment of anaphylaxis, then go to the ER or call 911., Disp: 2 each, Rfl: 2  •  ergocalciferol (ERGOCALCIFEROL) 1.25 MG (23722 UT) capsule, Take 1 capsule by mouth 1 (One) Time Per Week., Disp: 4 capsule, Rfl: 5  •  Evolocumab (REPATHA) solution auto-injector SureClick injection, Inject 1 mL under the skin into the appropriate area as directed Every 14 (Fourteen) Days., Disp: 2 mL, Rfl: 5  •  fluticasone (FLONASE) 50 MCG/ACT nasal spray, Use 1 spray in each nostril Twice a day for 30 days, Disp: 16 g, Rfl: 11  •  fluticasone (Flovent HFA) 110 MCG/ACT inhaler, Inhale 1 puff by mouth 2 (Two) Times a Day., Disp: 12 g, Rfl: 5  •  fluticasone (FLOVENT HFA) 44 MCG/ACT inhaler, Inhale 1 puff by mouth twice a day., Disp: 10.6 g, Rfl: 5  •  glucose blood (OneTouch Ultra) test strip, Use as instructed 2 times daily and as needed, Disp: 100 each, Rfl: 3  •  HYDROcodone-acetaminophen (NORCO) 5-325 MG per tablet, Take 1 tablet by mouth Every 8 (Eight) Hours for 10 days  (max daily dose of 3 tablets)., Disp: 30 tablet, Rfl: 0  •  HYDROcodone-acetaminophen (Norco) 7.5-325 MG per tablet, Take 1 tablet by mouth every 6 (six) hours as needed for  pain for up to 10 days. Max Daily Amount: 4 tablets, Disp: 25 tablet, Rfl: 0  •  ibuprofen (ADVIL,MOTRIN) 800 MG tablet, Take 1 tablet by mouth 2 (Two) Times a Day As Needed., Disp: 40 tablet, Rfl: 0  •  ibuprofen (ADVIL,MOTRIN) 800 MG tablet, Take 1 tablet by mouth Every 6 (Six) Hours As Needed., Disp: 40 tablet, Rfl: 1  •  ipratropium-albuterol (Combivent Respimat)  MCG/ACT inhaler, INHALE 1 PUFF BY MOUTH 4 (FOUR) TIMES A DAY AS NEEDED FOR WHEEZING., Disp: 4 g, Rfl: 5  •  ipratropium-albuterol (DUO-NEB) 0.5-2.5 mg/3 ml nebulizer, Inhale the contents of 3 mL vial nebulizer every 6 hours as needed., Disp: 360 mL, Rfl: 3  •  Lancets (OneTouch Delica Plus [id number]) misc, Use as instructed 2 times daily and as needed, Disp: 100 each, Rfl: 3  •  levocetirizine (XYZAL) 5 MG tablet, Take 1 tablet by mouth in the evening Once a day 30 days, Disp: 30 tablet, Rfl: 11  •  lisinopril (PRINIVIL,ZESTRIL) 30 MG tablet, Take 1 tablet by mouth Daily for blood pressure., Disp: 90 tablet, Rfl: 1  •  loratadine (CLARITIN) 10 MG tablet, Take 1 tablet by mouth Daily As Needed for Allergies., Disp: 30 tablet, Rfl: 5  •  methocarbamol (ROBAXIN) 750 MG tablet, Take 1 tablet by mouth 3 (Three) Times a Day., Disp: 90 tablet, Rfl: 0  •  mirtazapine (REMERON) 30 MG tablet, Take 1 & 1/2  tablets by mouth Every Night., Disp: 45 tablet, Rfl: 5  •  montelukast (SINGULAIR) 10 MG tablet, Take 1 tablet by mouth Every Night., Disp: 90 tablet, Rfl: 2  •  mupirocin (BACTROBAN) 2 % ointment, Apply 1 Application topically to the appropriate area as directed 3 (Three) Times a Day., Disp: 30 g, Rfl: 0  •  ondansetron (Zofran) 4 MG tablet, Take 1 tablet by mouth Every 8 (Eight) Hours As Needed for Nausea., Disp: 20 tablet, Rfl: 0  •  phentermine (Adipex-P) 37.5 MG tablet, Take 1 tablet by mouth Every Morning Before Breakfast., Disp: 30 tablet, Rfl: 0  •  riFAXIMin (Xifaxan) 550 MG tablet, Take 1 tablet by mouth Daily., Disp: , Rfl:   •  rosuvastatin  "(Crestor) 40 MG tablet, Take 1 tablet by mouth Daily., Disp: 30 tablet, Rfl: 5  •  sodium chloride 0.65 % nasal spray, Use 1-3 sprays in each nostril Three times a day as needed 30 days, Disp: 44 mL, Rfl: 11  •  tamsulosin (FLOMAX) 0.4 MG capsule 24 hr capsule, Take 1 capsule by mouth Daily., Disp: 90 capsule, Rfl: 3  •  temazepam (RESTORIL) 30 MG capsule, Take 1 capsule by mouth At Night As Needed for Sleep., Disp: 30 capsule, Rfl: 1  •  vitamin C (ASCORBIC ACID) 500 MG tablet, Take 1 tablet by mouth 2 (Two) Times a Day., Disp: 60 tablet, Rfl: 5  •  Vonoprazan Fumarate (Voquezna) 20 MG tablet, Take 1 tablet by mouth Daily., Disp: , Rfl:       The following portions of the patient's history were reviewed and updated as appropriate: allergies, current medications, past family history, past medical history, past social history, past surgical history and problem list.    Social History     Tobacco Use   • Smoking status: Every Day     Current packs/day: 1.00     Average packs/day: 1 pack/day for 18.3 years (18.3 ttl pk-yrs)     Types: Cigars, Cigarettes     Start date: 4/11/2022     Passive exposure: Current   • Smokeless tobacco: Never   Vaping Use   • Vaping status: Never Used   Substance Use Topics   • Alcohol use: No   • Drug use: No       Review of Systems   Constitutional: Negative for decreased appetite and malaise/fatigue.   Cardiovascular:  Negative for chest pain, dyspnea on exertion and palpitations.   Respiratory:  Negative for cough and shortness of breath.        Objective  Vitals:    06/05/25 1018   BP: 139/86   Pulse: 88   SpO2: 99%   Weight: 108 kg (237 lb 6.4 oz)   Height: 170.2 cm (67\")     Body mass index is 37.18 kg/m².        Vitals reviewed.   Constitutional:       Appearance: Healthy appearance. Well-developed and not in distress.   HENT:      Head: Normocephalic and atraumatic.   Pulmonary:      Effort: Pulmonary effort is normal.      Breath sounds: Normal breath sounds. No wheezing. No rales. "   Cardiovascular:      Normal rate. Regular rhythm.      Murmurs: There is no murmur.      . No S3 and S4 gallop.   Edema:     Peripheral edema absent.   Abdominal:      General: Bowel sounds are normal.      Palpations: Abdomen is soft.   Skin:     General: Skin is warm and dry.   Neurological:      Mental Status: Alert, oriented to person, place, and time and oriented to person, place and time.   Psychiatric:         Mood and Affect: Mood normal.         Behavior: Behavior normal.         Lab Results   Component Value Date     05/24/2025    K 3.7 05/24/2025     05/24/2025    CO2 21.6 (L) 05/24/2025    BUN 14 05/24/2025    CREATININE 0.88 05/24/2025    GLUCOSE 124 (H) 05/24/2025    CALCIUM 8.4 (L) 05/24/2025    AST 29 05/24/2025    ALT 22 05/24/2025    ALKPHOS 96 05/24/2025     Lab Results   Component Value Date    WBC 8.66 05/24/2025    HGB 14.5 05/24/2025    HCT 41.7 05/24/2025     05/24/2025     Lab Results   Component Value Date    TSH 2.660 02/12/2025    PSA 0.5 11/22/2024    CHLPL 232 (H) 04/05/2016    TRIG 87 01/31/2025    HDL 64 (H) 01/31/2025    LDL 64 01/31/2025          Results for orders placed during the hospital encounter of 08/07/24    Adult Transthoracic Echo Complete W/ Cont if Necessary Per Protocol    Interpretation Summary  •  Normal left ventricular cavity size and wall thickness noted. All left ventricular wall segments contract normally.  •  Left ventricular ejection fraction appears to be 61 - 65%.  •  Left ventricular diastolic function was normal.  •  The aortic valve is abnormal in structure. A bicuspid aortic valve cannot be excluded. There is mild calcification of the aortic valve.  •  Trace aortic valve regurgitation is present. No hemodynamically significant aortic valve stenosis is present.  •  The mitral valve is structurally normal with no significant stenosis present. Trace mitral valve regurgitation is present.  •  There is no evidence of pericardial effusion.  .     Results for orders placed during the hospital encounter of 06/04/24    Stress test with myocardial perfusion one day    Interpretation Summary  Images from the original result were not included.    •  A pharmacological stress test was performed using regadenoson without low-level exercise.  •  Findings consistent with a normal ECG stress test.  •  Myocardial perfusion imaging indicates a normal myocardial perfusion study with no evidence of ischemia.  •  Abnormal LV wall motion consistent with mild hypokineses.  •  Left ventricular ejection fraction is mildly reduced (Calculated EF = 50%).  •  Impressions are consistent with a low risk study.           Procedures    Assessment & Plan   Diagnosis Plan   1. Precordial pain        2. Essential hypertension        3. Tobacco abuse                 Recommendations:  Precordial pain-Dr. Daily has recommended pursuing further ischemic evaluation in the future only if the patient has any EKG changes associated with chest pain or elevated troponin as he does have a long history of chronic chest pains, normal nuclear stress test, and history of allergy to radiocontrast dye which caused him to have a rash and shortness of breath.  If he has any persistent chest pains we will certainly consider further ischemic evaluation.  Adipex is not recommended from a cardiac standpoint given potential side effects.  Essential hypertension-BP stable.  Tobacco abuse- I have advised the patient to stop smoking and explained the risks of continued tobacco abuse.   Follow up in 6 months or sooner if needed.        Return in about 6 months (around 12/5/2025) for Recheck.    As always, I appreciate very much the opportunity to participate in the cardiovascular care of your patients.      With Best Regards,    BALDOMERO Horowitz

## 2025-06-05 NOTE — PROGRESS NOTES
Tiera Valenzuela, BALDOMERO Mckay  1970 06/05/2025    Patient Active Problem List   Diagnosis    Gastroesophageal reflux disease without esophagitis    Essential hypertension    Seizures    Hyperlipidemia    Anxiety    Varicocele present on ultrasound of scrotum    Chronic bilateral low back pain with left-sided sciatica    Seasonal allergic rhinitis due to pollen    Mild persistent asthma without complication    Precordial pain    Burning with urination    Congenital coronary artery anomaly    BMI 35.0-35.9,adult    Chondromalacia, knee    Achilles tendinitis of both lower extremities    Muscle spasm of both lower legs    Personal history of allergy to shellfish    Sensation of cold in lower extremity    Varicose vein of leg    Heart palpitations    Generalized anxiety disorder    Chronic elbow pain, right    Unstable angina    ASCVD (arteriosclerotic cardiovascular disease)    Other constipation    Class 2 severe obesity due to excess calories with serious comorbidity and body mass index (BMI) of 36.0 to 36.9 in adult    Bacteremia    Therapeutic opioid-induced constipation (OIC)    Rectal bleeding    Bloating    History of colon polyps    Lateral epicondylitis of right elbow    Psychophysiological insomnia    Chronic rhinitis    Vitamin D deficiency    Renal calculus    Chronic idiopathic constipation    Diarrhea    Bilateral flank pain    BPH without obstruction/lower urinary tract symptoms    Incomplete bladder emptying    Frequency of urination    Mixed stress and urge urinary incontinence    History of kidney stones    Benign prostatic hyperplasia (BPH) with straining on urination    Peroneal tendon tear, right, sequela    Nausea    Gastro-esophageal reflux disease with esophagitis    Bilateral carpal tunnel syndrome       Tiera Arreaga, BALDOMERO:    Subjective     Chief Complaint   Patient presents with    Hospital Follow Up Visit     [facility] ER         History of Present Illness:    Jaquan Mckay  "is a 54 y.o. male with a past medical history of chronic chest pains and hypertension.  He presents today for cardiology follow-up.  Recently, he had developed burning pain in his left chest and arm.  He went to [facility] ED.  High-sensitivity troponin was normal x 2.  EKG revealed normal sinus rhythm with no acute ischemic changes.  He reports prior to this episode he had not been having any chest pains and has not had any chest pain since this time.  His blood pressure has been well-controlled.  He does have severe allergy to contrast dye.  In addition, he has been taking Adipex and is asking if this is okay from a cardiac standpoint.    He had a stress test in June 2024 which was negative for evidence of myocardial ischemia.  An echocardiogram in August 2024 revealed normal LVEF with no wall motion abnormalities.  Cardiac cath in 2018 revealed minimal coronary artery disease.      Allergies   Allergen Reactions    Ciprofloxacin Anaphylaxis and Hives    Miralax [Polyethylene Glycol] Itching and Rash    Mobic [Meloxicam] Other (See Comments)     Pt states, \"It make my feet and hands go numb and I can't hardly walk.\"     Paxil [Paroxetine Hcl] Shortness Of Breath     Chest pain     Peanut-Containing Drug Products Anaphylaxis    Penicillins Anaphylaxis    Pristiq [Desvenlafaxine Succinate Er] Dizziness    Sulfa Antibiotics Anaphylaxis, Itching and Rash    Doxycycline Hives    Fish-Derived Products Hives    Isosorbide Nitrate Rash     Rash, hives, had to use inhaler.     Lyrica [Pregabalin] Hives    Movantik [Naloxegol] Rash    Seroquel [Quetiapine] Hives and Rash    Trulance [Plecanatide] Hives    Buspar [Buspirone] Rash    Clarithromycin Rash    Clindamycin/Lincomycin Rash    Codeine Rash    Contrast Dye (Echo Or Unknown Ct/Mr) Itching and Rash    Diltiazem Rash    Elavil [Amitriptyline] Rash    Flomax [Tamsulosin] Hives    Gabapentin Rash    Iodinated Contrast Media Itching and Rash    Keflex " [Cephalexin] Rash    Levocetirizine Rash    Linzess [Linaclotide] Rash    Metoprolol Rash    Prednisone Rash and Other (See Comments)     Face, feet, and legs go completely numb per patient    Robitussin Cough+ Chest Max St [Dextromethorphan-Guaifenesin] Itching    Shrimp (Diagnostic) Rash    Spironolactone Rash    Viibryd [Vilazodone Hcl] Itching and Rash    Zoloft [Sertraline Hcl] Hives and Itching   :      Current Outpatient Medications:     albuterol sulfate  (90 Base) MCG/ACT inhaler, Inhale 2 puffs by mouth every 4 hours as needed., Disp: 18 g, Rfl: 3    Alcohol Swabs (Alcohol Prep) 70 % pads, Use 1 each 2 (Two) Times a Day., Disp: 60 each, Rfl: 5    aspirin (Aspirin EC Adult Low Dose) 81 MG EC tablet, Take 1 tablet by mouth Daily., Disp: 30 tablet, Rfl: 3    azelastine (ASTELIN) 0.1 % nasal spray, Administer 1 spray into the nostril(s) as directed by provider 2 (Two) Times a Day., Disp: 30 mL, Rfl: 5    cloNIDine (Catapres) 0.1 MG tablet, Take 1 tablet by mouth 2 (Two) Times a Day As Needed for High Blood Pressure (if bp more than 140/90)., Disp: 60 tablet, Rfl: 1    dexAMETHasone (DECADRON) 4 MG/ML injection, Use 1 mL at Physical Therapy for ionthophoresis., Disp: 40 mL, Rfl: 0    dexlansoprazole (Dexilant) 60 MG capsule, Take 1 capsule by mouth 2 (Two) Times a Day., Disp: 60 capsule, Rfl: 5    Diclofenac Sodium (VOLTAREN) 1 % gel gel, Apply 2 grams topically to the affect area as directed 4 (Four) Times a Day., Disp: 200 g, Rfl: 2    Dilantin 100 MG capsule, Take 2 capsules by mouth 2 (Two) Times a Day., Disp: 120 capsule, Rfl: 5    diphenhydrAMINE (Benadryl Allergy) 25 MG tablet, Take 1-2 tablets by mouth Every 8 (Eight) Hours As Needed for Itching (or rash)., Disp: 120 tablet, Rfl: 2    docusate calcium (SURFAK) 240 MG capsule, Take 1 capsule by mouth 2 (Two) Times a Day As Needed for Constipation., Disp: 60 capsule, Rfl: 2    Elastic Bandages & Supports (ACE Ankle Brace) misc, 1 each Daily., Disp:  1 each, Rfl: 0    EPINEPHrine (EPIPEN) 0.3 MG/0.3ML solution auto-injector injection, Inject Intramuscularly into lateral thigh as needed for treatment of anaphylaxis, then go to the ER or call 911., Disp: 2 each, Rfl: 2    ergocalciferol (ERGOCALCIFEROL) 1.25 MG (03323 UT) capsule, Take 1 capsule by mouth 1 (One) Time Per Week., Disp: 4 capsule, Rfl: 5    Evolocumab (REPATHA) solution auto-injector SureClick injection, Inject 1 mL under the skin into the appropriate area as directed Every 14 (Fourteen) Days., Disp: 2 mL, Rfl: 5    fluticasone (FLONASE) 50 MCG/ACT nasal spray, Use 1 spray in each nostril Twice a day for 30 days, Disp: 16 g, Rfl: 11    fluticasone (Flovent HFA) 110 MCG/ACT inhaler, Inhale 1 puff by mouth 2 (Two) Times a Day., Disp: 12 g, Rfl: 5    fluticasone (FLOVENT HFA) 44 MCG/ACT inhaler, Inhale 1 puff by mouth twice a day., Disp: 10.6 g, Rfl: 5    glucose blood (OneTouch Ultra) test strip, Use as instructed 2 times daily and as needed, Disp: 100 each, Rfl: 3    HYDROcodone-acetaminophen (NORCO) 5-325 MG per tablet, Take 1 tablet by mouth Every 8 (Eight) Hours for 10 days  (max daily dose of 3 tablets)., Disp: 30 tablet, Rfl: 0    HYDROcodone-acetaminophen (Norco) 7.5-325 MG per tablet, Take 1 tablet by mouth every 6 (six) hours as needed for pain for up to 10 days. Max Daily Amount: 4 tablets, Disp: 25 tablet, Rfl: 0    ibuprofen (ADVIL,MOTRIN) 800 MG tablet, Take 1 tablet by mouth 2 (Two) Times a Day As Needed., Disp: 40 tablet, Rfl: 0    ibuprofen (ADVIL,MOTRIN) 800 MG tablet, Take 1 tablet by mouth Every 6 (Six) Hours As Needed., Disp: 40 tablet, Rfl: 1    ipratropium-albuterol (Combivent Respimat)  MCG/ACT inhaler, INHALE 1 PUFF BY MOUTH 4 (FOUR) TIMES A DAY AS NEEDED FOR WHEEZING., Disp: 4 g, Rfl: 5    ipratropium-albuterol (DUO-NEB) 0.5-2.5 mg/3 ml nebulizer, Inhale the contents of 3 mL vial nebulizer every 6 hours as needed., Disp: 360 mL, Rfl: 3    Lancets (OneTouch Delica Plus  [id number]) misc, Use as instructed 2 times daily and as needed, Disp: 100 each, Rfl: 3    levocetirizine (XYZAL) 5 MG tablet, Take 1 tablet by mouth in the evening Once a day 30 days, Disp: 30 tablet, Rfl: 11    lisinopril (PRINIVIL,ZESTRIL) 30 MG tablet, Take 1 tablet by mouth Daily for blood pressure., Disp: 90 tablet, Rfl: 1    loratadine (CLARITIN) 10 MG tablet, Take 1 tablet by mouth Daily As Needed for Allergies., Disp: 30 tablet, Rfl: 5    methocarbamol (ROBAXIN) 750 MG tablet, Take 1 tablet by mouth 3 (Three) Times a Day., Disp: 90 tablet, Rfl: 0    mirtazapine (REMERON) 30 MG tablet, Take 1 & 1/2  tablets by mouth Every Night., Disp: 45 tablet, Rfl: 5    montelukast (SINGULAIR) 10 MG tablet, Take 1 tablet by mouth Every Night., Disp: 90 tablet, Rfl: 2    mupirocin (BACTROBAN) 2 % ointment, Apply 1 Application topically to the appropriate area as directed 3 (Three) Times a Day., Disp: 30 g, Rfl: 0    ondansetron (Zofran) 4 MG tablet, Take 1 tablet by mouth Every 8 (Eight) Hours As Needed for Nausea., Disp: 20 tablet, Rfl: 0    phentermine (Adipex-P) 37.5 MG tablet, Take 1 tablet by mouth Every Morning Before Breakfast., Disp: 30 tablet, Rfl: 0    riFAXIMin (Xifaxan) 550 MG tablet, Take 1 tablet by mouth Daily., Disp: , Rfl:     rosuvastatin (Crestor) 40 MG tablet, Take 1 tablet by mouth Daily., Disp: 30 tablet, Rfl: 5    sodium chloride 0.65 % nasal spray, Use 1-3 sprays in each nostril Three times a day as needed 30 days, Disp: 44 mL, Rfl: 11    tamsulosin (FLOMAX) 0.4 MG capsule 24 hr capsule, Take 1 capsule by mouth Daily., Disp: 90 capsule, Rfl: 3    temazepam (RESTORIL) 30 MG capsule, Take 1 capsule by mouth At Night As Needed for Sleep., Disp: 30 capsule, Rfl: 1    vitamin C (ASCORBIC ACID) 500 MG tablet, Take 1 tablet by mouth 2 (Two) Times a Day., Disp: 60 tablet, Rfl: 5    Vonoprazan Fumarate (Voquezna) 20 MG tablet, Take 1 tablet by mouth Daily., Disp: , Rfl:       The following portions of the  "patient's history were reviewed and updated as appropriate: allergies, current medications, past family history, past medical history, past social history, past surgical history and problem list.    Social History     Tobacco Use    Smoking status: Every Day     Current packs/day: 1.00     Average packs/day: 1 pack/day for 18.3 years (18.3 ttl pk-yrs)     Types: Cigars, Cigarettes     Start date: 4/11/2022     Passive exposure: Current    Smokeless tobacco: Never   Vaping Use    Vaping status: Never Used   Substance Use Topics    Alcohol use: No    Drug use: No       Review of Systems   Constitutional: Negative for decreased appetite and malaise/fatigue.   Cardiovascular:  Negative for chest pain, dyspnea on exertion and palpitations.   Respiratory:  Negative for cough and shortness of breath.        Objective   Vitals:    06/05/25 1018   BP: 139/86   Pulse: 88   SpO2: 99%   Weight: 108 kg (237 lb 6.4 oz)   Height: 170.2 cm (67\")     Body mass index is 37.18 kg/m².        Vitals reviewed.   Constitutional:       Appearance: Healthy appearance. Well-developed and not in distress.   HENT:      Head: Normocephalic and atraumatic.   Pulmonary:      Effort: Pulmonary effort is normal.      Breath sounds: Normal breath sounds. No wheezing. No rales.   Cardiovascular:      Normal rate. Regular rhythm.      Murmurs: There is no murmur.      . No S3 and S4 gallop.   Edema:     Peripheral edema absent.   Abdominal:      General: Bowel sounds are normal.      Palpations: Abdomen is soft.   Skin:     General: Skin is warm and dry.   Neurological:      Mental Status: Alert, oriented to person, place, and time and oriented to person, place and time.   Psychiatric:         Mood and Affect: Mood normal.         Behavior: Behavior normal.         Lab Results   Component Value Date     05/24/2025    K 3.7 05/24/2025     05/24/2025    CO2 21.6 (L) 05/24/2025    BUN 14 05/24/2025    CREATININE 0.88 05/24/2025    GLUCOSE 124 " (H) 05/24/2025    CALCIUM 8.4 (L) 05/24/2025    AST 29 05/24/2025    ALT 22 05/24/2025    ALKPHOS 96 05/24/2025     Lab Results   Component Value Date    WBC 8.66 05/24/2025    HGB 14.5 05/24/2025    HCT 41.7 05/24/2025     05/24/2025     Lab Results   Component Value Date    TSH 2.660 02/12/2025    PSA 0.5 11/22/2024    CHLPL 232 (H) 04/05/2016    TRIG 87 01/31/2025    HDL 64 (H) 01/31/2025    LDL 64 01/31/2025          Results for orders placed during the hospital encounter of 08/07/24    Adult Transthoracic Echo Complete W/ Cont if Necessary Per Protocol    Interpretation Summary    Normal left ventricular cavity size and wall thickness noted. All left ventricular wall segments contract normally.    Left ventricular ejection fraction appears to be 61 - 65%.    Left ventricular diastolic function was normal.    The aortic valve is abnormal in structure. A bicuspid aortic valve cannot be excluded. There is mild calcification of the aortic valve.    Trace aortic valve regurgitation is present. No hemodynamically significant aortic valve stenosis is present.    The mitral valve is structurally normal with no significant stenosis present. Trace mitral valve regurgitation is present.    There is no evidence of pericardial effusion. .     Results for orders placed during the hospital encounter of 06/04/24    Stress test with myocardial perfusion one day    Interpretation Summary  Images from the original result were not included.      A pharmacological stress test was performed using regadenoson without low-level exercise.    Findings consistent with a normal ECG stress test.    Myocardial perfusion imaging indicates a normal myocardial perfusion study with no evidence of ischemia.    Abnormal LV wall motion consistent with mild hypokineses.    Left ventricular ejection fraction is mildly reduced (Calculated EF = 50%).    Impressions are consistent with a low risk study.           Procedures    Assessment & Plan     Diagnosis Plan   1. Precordial pain        2. Essential hypertension        3. Tobacco abuse                 Recommendations:  Precordial pain-Dr. Daily has recommended pursuing further ischemic evaluation in the future only if the patient has any EKG changes associated with chest pain or elevated troponin as he does have a long history of chronic chest pains, normal nuclear stress test, and history of allergy to radiocontrast dye which caused him to have a rash and shortness of breath.  If he has any persistent chest pains we will certainly consider further ischemic evaluation.  Adipex is not recommended from a cardiac standpoint given potential side effects.  Essential hypertension-BP stable.  Tobacco abuse- I have advised the patient to stop smoking and explained the risks of continued tobacco abuse.   Follow up in 6 months or sooner if needed.        Return in about 6 months (around 12/5/2025) for Recheck.    As always, I appreciate very much the opportunity to participate in the cardiovascular care of your patients.      With Best Regards,    BALDOMERO Horowitz

## 2025-06-06 ENCOUNTER — LAB (OUTPATIENT)
Dept: LAB | Facility: HOSPITAL | Age: 55
End: 2025-06-06
Payer: COMMERCIAL

## 2025-06-06 LAB
AMPHET+METHAMPHET UR QL: POSITIVE
AMPHETAMINES UR QL: NEGATIVE
BARBITURATES UR QL SCN: POSITIVE
BENZODIAZ UR QL SCN: POSITIVE
BUPRENORPHINE SERPL-MCNC: NEGATIVE NG/ML
CANNABINOIDS SERPL QL: NEGATIVE
COCAINE UR QL: NEGATIVE
FENTANYL UR-MCNC: NEGATIVE NG/ML
METHADONE UR QL SCN: NEGATIVE
OPIATES UR QL: NEGATIVE
OXYCODONE UR QL SCN: NEGATIVE
PCP UR QL SCN: NEGATIVE
TRICYCLICS UR QL SCN: NEGATIVE

## 2025-06-06 PROCEDURE — 80307 DRUG TEST PRSMV CHEM ANLYZR: CPT | Performed by: NURSE PRACTITIONER

## 2025-06-06 PROCEDURE — 83036 HEMOGLOBIN GLYCOSYLATED A1C: CPT | Performed by: NURSE PRACTITIONER

## 2025-06-06 PROCEDURE — 82306 VITAMIN D 25 HYDROXY: CPT | Performed by: NURSE PRACTITIONER

## 2025-06-06 PROCEDURE — 84439 ASSAY OF FREE THYROXINE: CPT | Performed by: NURSE PRACTITIONER

## 2025-06-06 PROCEDURE — 80186 ASSAY OF PHENYTOIN FREE: CPT | Performed by: NURSE PRACTITIONER

## 2025-06-06 PROCEDURE — 80061 LIPID PANEL: CPT | Performed by: NURSE PRACTITIONER

## 2025-06-06 PROCEDURE — 82607 VITAMIN B-12: CPT | Performed by: NURSE PRACTITIONER

## 2025-06-06 PROCEDURE — 36415 COLL VENOUS BLD VENIPUNCTURE: CPT | Performed by: NURSE PRACTITIONER

## 2025-06-06 PROCEDURE — 84443 ASSAY THYROID STIM HORMONE: CPT | Performed by: NURSE PRACTITIONER

## 2025-06-06 PROCEDURE — 85025 COMPLETE CBC W/AUTO DIFF WBC: CPT | Performed by: NURSE PRACTITIONER

## 2025-06-06 PROCEDURE — 80053 COMPREHEN METABOLIC PANEL: CPT | Performed by: NURSE PRACTITIONER

## 2025-06-06 PROCEDURE — 80185 ASSAY OF PHENYTOIN TOTAL: CPT | Performed by: NURSE PRACTITIONER

## 2025-06-07 LAB
25(OH)D3 SERPL-MCNC: 53.5 NG/ML (ref 30–100)
ALBUMIN SERPL-MCNC: 3.9 G/DL (ref 3.5–5.2)
ALBUMIN/GLOB SERPL: 1.2 G/DL
ALP SERPL-CCNC: 112 U/L (ref 39–117)
ALT SERPL W P-5'-P-CCNC: 19 U/L (ref 1–41)
ANION GAP SERPL CALCULATED.3IONS-SCNC: 10.2 MMOL/L (ref 5–15)
AST SERPL-CCNC: 22 U/L (ref 1–40)
BASOPHILS # BLD AUTO: 0.03 10*3/MM3 (ref 0–0.2)
BASOPHILS NFR BLD AUTO: 0.4 % (ref 0–1.5)
BILIRUB SERPL-MCNC: 0.3 MG/DL (ref 0–1.2)
BUN SERPL-MCNC: 17 MG/DL (ref 6–20)
BUN/CREAT SERPL: 16.2 (ref 7–25)
CALCIUM SPEC-SCNC: 8.6 MG/DL (ref 8.6–10.5)
CHLORIDE SERPL-SCNC: 105 MMOL/L (ref 98–107)
CHOLEST SERPL-MCNC: 129 MG/DL (ref 0–200)
CO2 SERPL-SCNC: 20.8 MMOL/L (ref 22–29)
CREAT SERPL-MCNC: 1.05 MG/DL (ref 0.76–1.27)
DEPRECATED RDW RBC AUTO: 44.9 FL (ref 37–54)
EGFRCR SERPLBLD CKD-EPI 2021: 85.4 ML/MIN/1.73
EOSINOPHIL # BLD AUTO: 0.15 10*3/MM3 (ref 0–0.4)
EOSINOPHIL NFR BLD AUTO: 2 % (ref 0.3–6.2)
ERYTHROCYTE [DISTWIDTH] IN BLOOD BY AUTOMATED COUNT: 12.9 % (ref 12.3–15.4)
GLOBULIN UR ELPH-MCNC: 3.2 GM/DL
GLUCOSE SERPL-MCNC: 104 MG/DL (ref 65–99)
HBA1C MFR BLD: 4.7 % (ref 4.8–5.6)
HCT VFR BLD AUTO: 43 % (ref 37.5–51)
HDLC SERPL-MCNC: 58 MG/DL (ref 40–60)
HGB BLD-MCNC: 15.1 G/DL (ref 13–17.7)
IMM GRANULOCYTES # BLD AUTO: 0.02 10*3/MM3 (ref 0–0.05)
IMM GRANULOCYTES NFR BLD AUTO: 0.3 % (ref 0–0.5)
LDLC SERPL CALC-MCNC: 55 MG/DL (ref 0–100)
LDLC/HDLC SERPL: 0.95 {RATIO}
LYMPHOCYTES # BLD AUTO: 1.95 10*3/MM3 (ref 0.7–3.1)
LYMPHOCYTES NFR BLD AUTO: 25.8 % (ref 19.6–45.3)
MCH RBC QN AUTO: 33.4 PG (ref 26.6–33)
MCHC RBC AUTO-ENTMCNC: 35.1 G/DL (ref 31.5–35.7)
MCV RBC AUTO: 95.1 FL (ref 79–97)
MONOCYTES # BLD AUTO: 0.84 10*3/MM3 (ref 0.1–0.9)
MONOCYTES NFR BLD AUTO: 11.1 % (ref 5–12)
NEUTROPHILS NFR BLD AUTO: 4.58 10*3/MM3 (ref 1.7–7)
NEUTROPHILS NFR BLD AUTO: 60.4 % (ref 42.7–76)
NRBC BLD AUTO-RTO: 0 /100 WBC (ref 0–0.2)
PHENYTOIN SERPL-MCNC: 15.9 MCG/ML (ref 10–20)
PLATELET # BLD AUTO: 192 10*3/MM3 (ref 140–450)
PMV BLD AUTO: 11.9 FL (ref 6–12)
POTASSIUM SERPL-SCNC: 3.6 MMOL/L (ref 3.5–5.2)
PROT SERPL-MCNC: 7.1 G/DL (ref 6–8.5)
RBC # BLD AUTO: 4.52 10*6/MM3 (ref 4.14–5.8)
SODIUM SERPL-SCNC: 136 MMOL/L (ref 136–145)
T4 FREE SERPL-MCNC: 0.97 NG/DL (ref 0.92–1.68)
TRIGL SERPL-MCNC: 80 MG/DL (ref 0–150)
TSH SERPL DL<=0.05 MIU/L-ACNC: 1.82 UIU/ML (ref 0.27–4.2)
VIT B12 BLD-MCNC: 605 PG/ML (ref 211–946)
VLDLC SERPL-MCNC: 16 MG/DL (ref 5–40)
WBC NRBC COR # BLD AUTO: 7.57 10*3/MM3 (ref 3.4–10.8)

## 2025-06-09 NOTE — ASSESSMENT & PLAN NOTE
Continue under the care of GI.  Keep any appointment with GI provider for possible EGD  Encourage bland foods.  
You can access the FollowMyHealth Patient Portal offered by [facility] by registering at the following website: [email or web address]. By joining [facility]’s FollowMyHealth portal, you will also be able to view your health information using other applications (apps) compatible with our system.

## 2025-06-10 LAB — PHENYTOIN FREE SERPL-MCNC: 1.3 UG/ML (ref 1–2)

## 2025-06-11 ENCOUNTER — SPECIALTY PHARMACY (OUTPATIENT)
Dept: PHARMACY | Facility: HOSPITAL | Age: 55
End: 2025-06-11
Payer: COMMERCIAL

## 2025-06-11 NOTE — PROGRESS NOTES
"   [facility] Patient Management Program  Lipid Management Program - PCSK9i follow up Assessment     Jaquan Mckay is a 54 y.o. male referred by their provider, Tiera Valenzuela, to the Hyperlipidemia Patient Management program offered by [facility] for Lipid Management.  Jaquan Mckay is  treated for ASCVD and hyperlipidemia, and currently takes crestor 40 mg.  In the past, Pt has tried lipitor 40 mg, zocor 10 mg, pravastatin 80 mg and is not statin intolerant. The patient denies any allergies to latex.       A follow-up outreach was conducted, including assessment of continued therapy appropriateness, medication adherence, and side effect incidence and management for Repatha. Patient comes to clinic for injections and has not missed any doses.  Repatha was started on 10/29/24, and patient reports tolerating the medication well with no side effects.    He reports he tolerated last Repatha injection without any issues. He denies any side effects.     Changes to Insurance Coverage or Financial Support  [employer] [place] (no change)    Relevant Past Medical History and Comorbidities  Relevant medical history and concomitant health conditions were discussed with the patient. The patient's chart has been reviewed for relevant past medical history and comorbid health conditions and updated as necessary.   Past Medical History:   Diagnosis Date    Allergic     Anxiety     Arthritis     Asthma     Body piercing     REPORTS CYLICONE IN EARS    Clotting disorder 2004    had a knee surgery    Coronary artery disease     Depression     DVT (deep venous thrombosis)     RIGHT RIGHT KNEE AFTER SURGERY YEARS AGO IN 2001 OR 2004    Elevated cholesterol     Gastric ulcer     GERD (gastroesophageal reflux disease)     H/O migraine     Headache     Heart attack     REPORTS \"LIGHT HEART ATTACK A LONG TIME AGO\"  \"EARLY 90'S\"    History of " "seizures     REPORTS LAST EPISODE WAS AROUND 1995.    Hostility     Hyperlipidemia     Hypertension     Knee pain, acute     Left    Low back pain     Lyme disease     Migraine     MRSA (methicillin resistant Staphylococcus aureus)     REPORTS LAST TESTED + 2004. WAS TREATED HE REPORTS.  RIGHT ARM, RIGHT KNEE.    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     Obesity     Poor historian     [place] spotted fever     Seizures     Sleep apnea     Tattoo     Wears glasses      Social History     Socioeconomic History    Marital status:      Spouse name: Becca    Number of children: 2    Years of education: 12   Tobacco Use    Smoking status: Every Day     Current packs/day: 1.00     Average packs/day: 1 pack/day for 18.3 years (18.3 ttl pk-yrs)     Types: Cigars, Cigarettes     Start date: 4/11/2022     Passive exposure: Current    Smokeless tobacco: Never   Vaping Use    Vaping status: Never Used   Substance and Sexual Activity    Alcohol use: No    Drug use: No    Sexual activity: Defer     Partners: Female     Birth control/protection: None          Hospitalizations and Urgent Care Since Last Assessment  ED Visits, Admissions, or Hospitalizations: none  Urgent Office Visits: none    Allergies  Known allergies and reactions were discussed with the patient. The patient's chart has been reviewed for allergy information and updated as necessary.   Allergies   Allergen Reactions    Ciprofloxacin Anaphylaxis and Hives    Miralax [Polyethylene Glycol] Itching and Rash    Mobic [Meloxicam] Other (See Comments)     Pt states, \"It make my feet and hands go numb and I can't hardly walk.\"     Paxil [Paroxetine Hcl] Shortness Of Breath     Chest pain     Peanut-Containing Drug Products Anaphylaxis    Penicillins Anaphylaxis    Pristiq [Desvenlafaxine Succinate Er] Dizziness    Sulfa Antibiotics Anaphylaxis, Itching and Rash    Doxycycline Hives    Fish-Derived Products Hives    Isosorbide Nitrate Rash     Rash, hives, had to use " inhaler.     Lyrica [Pregabalin] Hives    Movantik [Naloxegol] Rash    Seroquel [Quetiapine] Hives and Rash    Trulance [Plecanatide] Hives    Buspar [Buspirone] Rash    Clarithromycin Rash    Clindamycin/Lincomycin Rash    Codeine Rash    Contrast Dye (Echo Or Unknown Ct/Mr) Itching and Rash    Diltiazem Rash    Elavil [Amitriptyline] Rash    Flomax [Tamsulosin] Hives    Gabapentin Rash    Iodinated Contrast Media Itching and Rash    Keflex [Cephalexin] Rash    Levocetirizine Rash    Linzess [Linaclotide] Rash    Metoprolol Rash    Prednisone Rash and Other (See Comments)     Face, feet, and legs go completely numb per patient    Robitussin Cough+ Chest Max St [Dextromethorphan-Guaifenesin] Itching    Shrimp (Diagnostic) Rash    Spironolactone Rash    Viibryd [Vilazodone Hcl] Itching and Rash    Zoloft [Sertraline Hcl] Hives and Itching          Relevant Laboratory Values  Relevant laboratory values were discussed with the patient. The following specialty medication dose adjustment(s) are recommended: none    Lab Results   Component Value Date    GLUCOSE 104 (H) 06/06/2025    CALCIUM 8.6 06/06/2025     06/06/2025    K 3.6 06/06/2025    CO2 20.8 (L) 06/06/2025     06/06/2025    BUN 17.0 06/06/2025    CREATININE 1.05 06/06/2025    EGFRIFAFRI  08/26/2016      Comment:      <15 Indicative of kidney failure.    EGFRIFNONA 66 02/02/2022    BCR 16.2 06/06/2025    ANIONGAP 10.2 06/06/2025     Lab Results   Component Value Date    CHOL 129 06/06/2025    CHLPL 232 (H) 04/05/2016    TRIG 80 06/06/2025    HDL 58 06/06/2025    LDL 55 06/06/2025       Current Medication List  This medication list has been reviewed with the patient and evaluated for any interactions or necessary modifications/recommendations, and updated to include all prescription medications, OTC medications, and supplements the patient is currently taking.  This list reflects what is contained in the patient's profile, which has also been marked as  reviewed to communicate to other providers it is the most up to date version of the patient's current medication therapy.     Current Outpatient Medications:     albuterol sulfate  (90 Base) MCG/ACT inhaler, Inhale 2 puffs by mouth every 4 hours as needed., Disp: 18 g, Rfl: 3    Alcohol Swabs (Alcohol Prep) 70 % pads, Use 1 each 2 (Two) Times a Day., Disp: 60 each, Rfl: 5    aspirin (Aspirin EC Adult Low Dose) 81 MG EC tablet, Take 1 tablet by mouth Daily., Disp: 30 tablet, Rfl: 3    azelastine (ASTELIN) 0.1 % nasal spray, Administer 1 spray into the nostril(s) as directed by provider 2 (Two) Times a Day., Disp: 30 mL, Rfl: 5    cloNIDine (Catapres) 0.1 MG tablet, Take 1 tablet by mouth 2 (Two) Times a Day As Needed for High Blood Pressure (if bp more than 140/90)., Disp: 60 tablet, Rfl: 1    dexAMETHasone (DECADRON) 4 MG/ML injection, Use 1 mL at Physical Therapy for ionthophoresis., Disp: 40 mL, Rfl: 0    dexlansoprazole (Dexilant) 60 MG capsule, Take 1 capsule by mouth 2 (Two) Times a Day., Disp: 60 capsule, Rfl: 5    Diclofenac Sodium (VOLTAREN) 1 % gel gel, Apply 2 grams topically to the affect area as directed 4 (Four) Times a Day., Disp: 200 g, Rfl: 2    Dilantin 100 MG capsule, Take 2 capsules by mouth 2 (Two) Times a Day., Disp: 120 capsule, Rfl: 5    diphenhydrAMINE (Benadryl Allergy) 25 MG tablet, Take 1-2 tablets by mouth Every 8 (Eight) Hours As Needed for Itching (or rash)., Disp: 120 tablet, Rfl: 2    docusate calcium (SURFAK) 240 MG capsule, Take 1 capsule by mouth 2 (Two) Times a Day As Needed for Constipation., Disp: 60 capsule, Rfl: 2    Elastic Bandages & Supports (ACE Ankle Brace) misc, 1 each Daily., Disp: 1 each, Rfl: 0    EPINEPHrine (EPIPEN) 0.3 MG/0.3ML solution auto-injector injection, Inject Intramuscularly into lateral thigh as needed for treatment of anaphylaxis, then go to the ER or call 911., Disp: 2 each, Rfl: 2    ergocalciferol (ERGOCALCIFEROL) 1.25 MG (89190 UT) capsule,  Take 1 capsule by mouth 1 (One) Time Per Week., Disp: 4 capsule, Rfl: 5    Evolocumab (REPATHA) solution auto-injector SureClick injection, Inject 1 mL under the skin into the appropriate area as directed Every 14 (Fourteen) Days., Disp: 2 mL, Rfl: 5    fluticasone (FLONASE) 50 MCG/ACT nasal spray, Use 1 spray in each nostril Twice a day for 30 days, Disp: 16 g, Rfl: 11    fluticasone (Flovent HFA) 110 MCG/ACT inhaler, Inhale 1 puff by mouth 2 (Two) Times a Day., Disp: 12 g, Rfl: 5    fluticasone (FLOVENT HFA) 44 MCG/ACT inhaler, Inhale 1 puff by mouth twice a day., Disp: 10.6 g, Rfl: 5    glucose blood (OneTouch Ultra) test strip, Use as instructed 2 times daily and as needed, Disp: 100 each, Rfl: 3    HYDROcodone-acetaminophen (NORCO) 5-325 MG per tablet, Take 1 tablet by mouth Every 8 (Eight) Hours for 10 days  (max daily dose of 3 tablets)., Disp: 30 tablet, Rfl: 0    HYDROcodone-acetaminophen (Norco) 7.5-325 MG per tablet, Take 1 tablet by mouth every 6 (six) hours as needed for pain for up to 10 days. Max Daily Amount: 4 tablets, Disp: 25 tablet, Rfl: 0    ibuprofen (ADVIL,MOTRIN) 800 MG tablet, Take 1 tablet by mouth 2 (Two) Times a Day As Needed., Disp: 40 tablet, Rfl: 0    ibuprofen (ADVIL,MOTRIN) 800 MG tablet, Take 1 tablet by mouth Every 6 (Six) Hours As Needed., Disp: 40 tablet, Rfl: 1    ipratropium-albuterol (Combivent Respimat)  MCG/ACT inhaler, INHALE 1 PUFF BY MOUTH 4 (FOUR) TIMES A DAY AS NEEDED FOR WHEEZING., Disp: 4 g, Rfl: 5    ipratropium-albuterol (DUO-NEB) 0.5-2.5 mg/3 ml nebulizer, Inhale the contents of 3 mL vial nebulizer every 6 hours as needed., Disp: 360 mL, Rfl: 3    Lancets (OneTouch Delica Plus [id number]) misc, Use as instructed 2 times daily and as needed, Disp: 100 each, Rfl: 3    levocetirizine (XYZAL) 5 MG tablet, Take 1 tablet by mouth in the evening Once a day 30 days, Disp: 30 tablet, Rfl: 11    lisinopril (PRINIVIL,ZESTRIL) 30 MG tablet, Take 1 tablet by mouth Daily  for blood pressure., Disp: 90 tablet, Rfl: 1    loratadine (CLARITIN) 10 MG tablet, Take 1 tablet by mouth Daily As Needed for Allergies., Disp: 30 tablet, Rfl: 5    methocarbamol (ROBAXIN) 750 MG tablet, Take 1 tablet by mouth 3 (Three) Times a Day., Disp: 90 tablet, Rfl: 0    mirtazapine (REMERON) 30 MG tablet, Take 1 & 1/2  tablets by mouth Every Night., Disp: 45 tablet, Rfl: 5    montelukast (SINGULAIR) 10 MG tablet, Take 1 tablet by mouth Every Night., Disp: 90 tablet, Rfl: 2    mupirocin (BACTROBAN) 2 % ointment, Apply 1 Application topically to the appropriate area as directed 3 (Three) Times a Day., Disp: 30 g, Rfl: 0    ondansetron (Zofran) 4 MG tablet, Take 1 tablet by mouth Every 8 (Eight) Hours As Needed for Nausea., Disp: 20 tablet, Rfl: 0    phentermine (Adipex-P) 37.5 MG tablet, Take 1 tablet by mouth Every Morning Before Breakfast., Disp: 30 tablet, Rfl: 0    riFAXIMin (Xifaxan) 550 MG tablet, Take 1 tablet by mouth Daily., Disp: , Rfl:     rosuvastatin (Crestor) 40 MG tablet, Take 1 tablet by mouth Daily., Disp: 30 tablet, Rfl: 5    sodium chloride 0.65 % nasal spray, Use 1-3 sprays in each nostril Three times a day as needed 30 days, Disp: 44 mL, Rfl: 11    tamsulosin (FLOMAX) 0.4 MG capsule 24 hr capsule, Take 1 capsule by mouth Daily., Disp: 90 capsule, Rfl: 3    temazepam (RESTORIL) 30 MG capsule, Take 1 capsule by mouth At Night As Needed for Sleep., Disp: 30 capsule, Rfl: 1    vitamin C (ASCORBIC ACID) 500 MG tablet, Take 1 tablet by mouth 2 (Two) Times a Day., Disp: 60 tablet, Rfl: 5    Vonoprazan Fumarate (Voquezna) 20 MG tablet, Take 1 tablet by mouth Daily., Disp: , Rfl:   No current facility-administered medications for this visit.         Drug Interactions  No significant drug-drug interactions with Repatha according to literature.     Adverse Drug Reactions        Plan for ADR Management: n/a    Adherence, Self-Administration, and Current Therapy Problems  Adherence related to the  patient's specialty therapy was discussed with the patient. The Adherence segment of this outreach has been reviewed and updated.          Additional Barriers to Patient Self-Administration: patient refuses to administer himself  Methods for Supporting Patient Self-Administration: patient comes to [facility] clinic every 2 weeks for injections    Open Medication Therapy Problems  No medication therapy recommendations to display    Goals of Therapy  Goals related to the patient's specialty therapy were discussed with the patient. The Patient Goals segment of this outreach has been reviewed and updated.   Goals Addressed Today    None     LDL was 136 mg/dl on 3/20/24 and has improved to 132 mg/ml on 10/17/24. This lab work was completed prior to starting repatha therapy.    Quality of Life Assessment   Quality of Life related to the patient's enrollment in the patient management program and services provided was discussed with the patient. The QOL segment of this outreach has been reviewed and updated.       Reassessment Plan & Follow-Up  1.Medication Therapy Changes: Patient will continue Repatha 140mg every 2 weeks    A. Grecia GonzalezD administered injection in to the back of his LEFT arm   2. Related Plans, Therapy Recommendations, or Issues to Be Addressed: none  3. Pharmacist to perform regular assessments no more than (6) months from the previous assessment.  Patient will continue regular follow-up with referring provider.   4. Care Coordinator to set up future refill outreaches, coordinate prescription delivery, and escalate clinical questions to pharmacist.      Attestation      I attest the patient was actively involved in and has agreed to the above plan of care.  If the prescribed therapy is at any point deemed not appropriate based on the current or future assessments, a consultation will be initiated with the patient's specialty care provider to determine the best course of action. The revised plan  of therapy will be documented along with any required assessments and/or additional patient education provided.     Patient to follow up for injections in Clinic every 2 weeks    Jose Roque RPH  6/11/2025  13:47 EDT

## 2025-06-18 DIAGNOSIS — R21 RASH AND OTHER NONSPECIFIC SKIN ERUPTION: ICD-10-CM

## 2025-06-18 PROCEDURE — 99283 EMERGENCY DEPT VISIT LOW MDM: CPT

## 2025-06-18 RX ORDER — DIPHENHYDRAMINE HCL 2 %
25-50 CREAM (GRAM) TOPICAL EVERY 8 HOURS PRN
Qty: 120 TABLET | Refills: 2 | Status: SHIPPED | OUTPATIENT
Start: 2025-06-18

## 2025-06-19 ENCOUNTER — APPOINTMENT (OUTPATIENT)
Dept: GENERAL RADIOLOGY | Facility: HOSPITAL | Age: 55
End: 2025-06-19
Payer: COMMERCIAL

## 2025-06-19 ENCOUNTER — HOSPITAL ENCOUNTER (EMERGENCY)
Facility: HOSPITAL | Age: 55
Discharge: HOME | End: 2025-06-19
Attending: STUDENT IN AN ORGANIZED HEALTH CARE EDUCATION/TRAINING PROGRAM
Payer: COMMERCIAL

## 2025-06-19 ENCOUNTER — TELEPHONE (OUTPATIENT)
Dept: FAMILY MEDICINE CLINIC | Facility: CLINIC | Age: 55
End: 2025-06-19
Payer: COMMERCIAL

## 2025-06-19 VITALS
OXYGEN SATURATION: 96 % | WEIGHT: 231 LBS | HEIGHT: 67 IN | TEMPERATURE: 98.2 F | BODY MASS INDEX: 36.26 KG/M2 | DIASTOLIC BLOOD PRESSURE: 86 MMHG | HEART RATE: 90 BPM | RESPIRATION RATE: 16 BRPM | SYSTOLIC BLOOD PRESSURE: 150 MMHG

## 2025-06-19 DIAGNOSIS — S82.891A CLOSED FRACTURE OF RIGHT ANKLE, INITIAL ENCOUNTER: Primary | ICD-10-CM

## 2025-06-19 PROCEDURE — 73610 X-RAY EXAM OF ANKLE: CPT | Performed by: RADIOLOGY

## 2025-06-19 PROCEDURE — 73610 X-RAY EXAM OF ANKLE: CPT

## 2025-06-19 RX ORDER — DEXAMETHASONE 4 MG/1
4 TABLET ORAL 2 TIMES DAILY WITH MEALS
Qty: 6 TABLET | Refills: 0 | Status: SHIPPED | OUTPATIENT
Start: 2025-06-19

## 2025-06-19 NOTE — TELEPHONE ENCOUNTER
----- Message from Tiera Valenzuela sent at 6/18/2025 11:32 PM EDT -----  Sent  ----- Message -----  From: Lorna Gale MA  Sent: 6/18/2025   3:16 PM EDT  To: BALDOMERO Thao    Pt stated that he needs a prescription sent in for the name brand Benadryl he said the generic doesn't help.

## 2025-06-19 NOTE — ED PROVIDER NOTES
"Subjective   History of Present Illness  Jaquan Mckay is a 54-year-old no significant past medical history presenting to the emergency department for isolated right ankle pain.  Patient reports he slipped while walking twisting his right ankle.  Able to bear weight.  Also reports he is had a prior injury with surgery to that right ankle prior.  Neurovascularly intact.  Mild swelling noted.  No open lesions noted.  Patient denies pain elsewhere.  No blood thinners.    Review of Systems   Constitutional:  Negative for activity change and fatigue.   HENT:  Negative for congestion and sinus pressure.    Respiratory:  Negative for cough and shortness of breath.    Cardiovascular:  Negative for chest pain.   Gastrointestinal:  Negative for abdominal distention and abdominal pain.   Genitourinary:  Negative for difficulty urinating, dysuria and hematuria.   Musculoskeletal:  Negative for arthralgias.        Right ankle pain   Skin:  Negative for color change and rash.   Neurological:  Negative for dizziness, weakness and light-headedness.   All other systems reviewed and are negative.      Past Medical History:   Diagnosis Date    Allergic     Anxiety     Arthritis     Asthma     Body piercing     REPORTS CYLICONE IN EARS    Clotting disorder 2004    had a knee surgery    Coronary artery disease     Depression     DVT (deep venous thrombosis)     RIGHT RIGHT KNEE AFTER SURGERY YEARS AGO IN 2001 OR 2004    Elevated cholesterol     Gastric ulcer     GERD (gastroesophageal reflux disease)     H/O migraine     Headache     Heart attack     REPORTS \"LIGHT HEART ATTACK A LONG TIME AGO\"  \"EARLY 90'S\"    History of seizures     REPORTS LAST EPISODE WAS AROUND 1995.    Hostility     Hyperlipidemia     Hypertension     Knee pain, acute     Left    Low back pain     Lyme disease     Migraine     MRSA (methicillin resistant Staphylococcus aureus)     REPORTS LAST TESTED + 2004. WAS TREATED HE REPORTS.  RIGHT ARM, RIGHT KNEE.    No " "natural teeth     Obesity     Poor historian     [place] spotted fever     Seizures     Sleep apnea     Tattoo     Wears glasses        Allergies   Allergen Reactions    Ciprofloxacin Anaphylaxis and Hives    Miralax [Polyethylene Glycol] Itching and Rash    Mobic [Meloxicam] Other (See Comments)     Pt states, \"It make my feet and hands go numb and I can't hardly walk.\"     Paxil [Paroxetine Hcl] Shortness Of Breath     Chest pain     Peanut-Containing Drug Products Anaphylaxis    Penicillins Anaphylaxis    Pristiq [Desvenlafaxine Succinate Er] Dizziness    Sulfa Antibiotics Anaphylaxis, Itching and Rash    Doxycycline Hives    Fish-Derived Products Hives    Isosorbide Nitrate Rash     Rash, hives, had to use inhaler.     Lyrica [Pregabalin] Hives    Movantik [Naloxegol] Rash    Seroquel [Quetiapine] Hives and Rash    Trulance [Plecanatide] Hives    Buspar [Buspirone] Rash    Clarithromycin Rash    Clindamycin/Lincomycin Rash    Codeine Rash    Contrast Dye (Echo Or Unknown Ct/Mr) Itching and Rash    Diltiazem Rash    Elavil [Amitriptyline] Rash    Flomax [Tamsulosin] Hives    Gabapentin Rash    Iodinated Contrast Media Itching and Rash    Keflex [Cephalexin] Rash    Levocetirizine Rash    Linzess [Linaclotide] Rash    Metoprolol Rash    Prednisone Rash and Other (See Comments)     Face, feet, and legs go completely numb per patient    Robitussin Cough+ Chest Max St [Dextromethorphan-Guaifenesin] Itching    Shrimp (Diagnostic) Rash    Spironolactone Rash    Viibryd [Vilazodone Hcl] Itching and Rash    Zoloft [Sertraline Hcl] Hives and Itching       Past Surgical History:   Procedure Laterality Date    ABDOMINAL SURGERY      BACK SURGERY      BRAIN SURGERY  1986    Tumor removal     CARDIAC CATHETERIZATION N/A 9/28/2018    Procedure: Left Heart Cath;  Surgeon: Leandro Daily MD;  Location: [facility] CATH INVASIVE LOCATION;  Service: Cardiology    CHOLECYSTECTOMY      COLONOSCOPY      COLONOSCOPY N/A 8/2/2021 "    Procedure: COLONOSCOPY FOR SCREENING;  Surgeon: Irving Azar MD;  Location: [facility] OR;  Service: Gastroenterology;  Laterality: N/A;    CYST REMOVAL      pilonidal cyst    ELBOW EPICONDYLECTOMY Right 7/23/2020    Procedure: LATERAL EPICONDYLAR RELEASE;  Surgeon: Jose Ruiz MD;  Location: [facility] OR;  Service: Orthopedics;  Laterality: Right;    ENDOSCOPY      ENDOSCOPY N/A 8/2/2021    Procedure: ESOPHAGOGASTRODUODENOSCOPY WITH BIOPSY;  Surgeon: Irving Azar MD;  Location: [facility] OR;  Service: Gastroenterology;  Laterality: N/A;  esophageal dilatation to 20mm    FRACTURE SURGERY Right     elbow    KNEE ARTHROSCOPY Left 10/20/2017    Procedure: Diagnostic arthroscopy left knee with chondroplasty;  Surgeon: Marco Aguirre MD;  Location: [facility] OR;  Service:     KNEE ARTHROSCOPY Left 1/11/2021    Procedure: KNEE DIAGNOSTIC ARTHROSCOPY WITH  CHONDROPLASTY patella, femoral and medial;  Surgeon: Raul Eagle MD;  Location: [facility] OR;  Service: Orthopedics;  Laterality: Left;    KNEE SURGERY Right     MOUTH SURGERY      FULL MOUTH EXTRACTION    OTHER SURGICAL HISTORY      REPORTS 7 TICKS REMOVED FROM RIGHT ARM IN 2001 OR 2002    TENNIS ELBOW RELEASE Right 7/23/2020    Procedure: RIGHT TENNIS ELBOW RELEASE;  Surgeon: Jose Ruiz MD;  Location: [facility] OR;  Service: Orthopedics;  Laterality: Right;    TUMOR EXCISION      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       Family History   Problem Relation Age of Onset    Diabetes Mother     Hypertension Mother     Stroke Mother     Diabetes Father     Skin cancer Father     Hypertension Father     Heart attack Father     Diabetes Brother     Hypertension Brother     Heart disease Maternal Aunt     Heart disease Maternal Uncle     Heart disease Paternal Aunt     Heart disease Paternal Uncle     Heart disease Maternal Grandmother     Heart disease Maternal Grandfather     Heart disease Paternal Grandmother     Heart  disease Paternal Grandfather        Social History     Socioeconomic History    Marital status:      Spouse name: Becca    Number of children: 2    Years of education: 12   Tobacco Use    Smoking status: Every Day     Current packs/day: 1.00     Average packs/day: 1 pack/day for 18.3 years (18.3 ttl pk-yrs)     Types: Cigars, Cigarettes     Start date: 4/11/2022     Passive exposure: Current    Smokeless tobacco: Never   Vaping Use    Vaping status: Never Used   Substance and Sexual Activity    Alcohol use: No    Drug use: No    Sexual activity: Defer     Partners: Female     Birth control/protection: None           Objective   Physical Exam  Constitutional:       General: He is not in acute distress.     Appearance: Normal appearance. He is not ill-appearing.   HENT:      Head: Normocephalic and atraumatic.      Nose: No congestion or rhinorrhea.   Eyes:      Extraocular Movements: Extraocular movements intact.      Pupils: Pupils are equal, round, and reactive to light.   Cardiovascular:      Rate and Rhythm: Normal rate and regular rhythm.   Pulmonary:      Effort: Pulmonary effort is normal.      Breath sounds: Normal breath sounds.   Abdominal:      General: Bowel sounds are normal.      Palpations: Abdomen is soft.   Musculoskeletal:         General: Swelling, tenderness and signs of injury present. Normal range of motion.      Cervical back: Normal range of motion.      Comments: Patient has pain along the lateral aspect of his right ankle.  Mild swelling noted.  Full range of motion.  Palpable DP and TP pulses.   Skin:     General: Skin is warm.      Capillary Refill: Capillary refill takes less than 2 seconds.   Neurological:      General: No focal deficit present.      Mental Status: He is alert and oriented to person, place, and time.      Cranial Nerves: No cranial nerve deficit.      Sensory: No sensory deficit.      Motor: No weakness.      Deep Tendon Reflexes: Reflexes normal.          Procedures           ED Course                                                       Medical Decision Making  X-ray obtained which shows possible fracture of the right ankle.  Patient is given a boot, patient refuses a splint, requesting boot.  Patient is given referral to orthopedics.  Of note patient remains neurovascularly intact with no sensorimotor changes.  No further emergent workup warranted at this time.    Problems Addressed:  Closed fracture of right ankle, initial encounter: complicated acute illness or injury    Amount and/or Complexity of Data Reviewed  Radiology: ordered.        Final diagnoses:   Closed fracture of right ankle, initial encounter       ED Disposition  ED Disposition       ED Disposition   Discharge    Condition   Stable    Comment   --               Tiera Valenzuela, APRN  [address] [address] [phone]    Go in 2 days      Allen Marks DO  [address] [place] [address] [phone]    Go in 2 days           Medication List      No changes were made to your prescriptions during this visit.            Lesvia Messina MD  06/19/25 0616

## 2025-06-20 ENCOUNTER — SPECIALTY PHARMACY (OUTPATIENT)
Dept: PHARMACY | Facility: HOSPITAL | Age: 55
End: 2025-06-20
Payer: COMMERCIAL

## 2025-06-20 NOTE — PROGRESS NOTES
Specialty Pharmacy Patient Management Program  Refill Outreach     Jaquan was contacted today regarding refills of their medication(s).    Refill Questions      Flowsheet Row Most Recent Value   Changes to allergies? No   Changes to medications? No   New conditions or infections since last clinic visit No   Unplanned office visit, urgent care, ED, or hospital admission in the last 4 weeks  No   How does patient/caregiver feel medication is working? Good   Financial problems or insurance changes  No   Since the previous refill, were any specialty medication doses or scheduled injections missed or delayed?  No   Does this patient require a clinical escalation to a pharmacist? No            Delivery Questions      Flowsheet Row Most Recent Value   Delivery method  at Pharmacy   Delivery address Prescription   Copay verified? Yes   Copay amount $0   Copay form of payment No copayment ($0)   Signature Required No                 Follow-up: 28 day(s)     Ruth Yip, Pharmacy Technician  6/20/2025  09:29 EDT

## 2025-06-25 ENCOUNTER — SPECIALTY PHARMACY (OUTPATIENT)
Dept: PHARMACY | Facility: HOSPITAL | Age: 55
End: 2025-06-25
Payer: COMMERCIAL

## 2025-06-25 NOTE — PROGRESS NOTES
Medication Management Clinic/ Specialty Pharmacy Patient Management Program  Lipid Management Program - PCSK9i follow up Assessment     Jaquan Mckay is a 54 y.o. male referred by their provider, Tiera Valenzuela, to the Hyperlipidemia Patient Management program offered by [facility] for Lipid Management.  Jaquan Mckay is  treated for ASCVD and hyperlipidemia, and currently takes crestor 40 mg.  In the past, Pt has tried lipitor 40 mg, zocor 10 mg, pravastatin 80 mg and is not statin intolerant. The patient denies any allergies to latex.       A follow-up outreach was conducted, including assessment of continued therapy appropriateness, medication adherence, and side effect incidence and management for Repatha. Patient comes to clinic for injections and has not missed any doses.  Repatha was started on 10/29/24, and patient reports tolerating the medication well with no side effects.    He reports he tolerated last Repatha injection without any issues. He denies any side effects.     Pt had ED vsiit on 6/19/25 for more issues with his ankle. Patient is supposed to have follow up and did not provide much history regarding today.     Changes to Insurance Coverage or Financial Support  [employer] [place] (no change)    Relevant Past Medical History and Comorbidities  Relevant medical history and concomitant health conditions were discussed with the patient. The patient's chart has been reviewed for relevant past medical history and comorbid health conditions and updated as necessary.   Past Medical History:   Diagnosis Date    Allergic     Anxiety     Arthritis     Asthma     Body piercing     REPORTS CYLICONE IN EARS    Clotting disorder 2004    had a knee surgery    Coronary artery disease     Depression     DVT (deep venous thrombosis)     RIGHT RIGHT KNEE AFTER SURGERY YEARS AGO IN 2001 OR 2004    Elevated cholesterol     Gastric ulcer     GERD  "(gastroesophageal reflux disease)     H/O migraine     Headache     Heart attack     REPORTS \"LIGHT HEART ATTACK A LONG TIME AGO\"  \"EARLY 90'S\"    History of seizures     REPORTS LAST EPISODE WAS AROUND 1995.    Hostility     Hyperlipidemia     Hypertension     Knee pain, acute     Left    Low back pain     Lyme disease     Migraine     MRSA (methicillin resistant Staphylococcus aureus)     REPORTS LAST TESTED + 2004. WAS TREATED HE REPORTS.  RIGHT ARM, RIGHT KNEE.    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     Obesity     Poor historian     [place] spotted fever     Seizures     Sleep apnea     Tattoo     Wears glasses      Social History     Socioeconomic History    Marital status:      Spouse name: Becca    Number of children: 2    Years of education: 12   Tobacco Use    Smoking status: Every Day     Current packs/day: 1.00     Average packs/day: 1 pack/day for 18.3 years (18.3 ttl pk-yrs)     Types: Cigars, Cigarettes     Start date: 4/11/2022     Passive exposure: Current    Smokeless tobacco: Never   Vaping Use    Vaping status: Never Used   Substance and Sexual Activity    Alcohol use: No    Drug use: No    Sexual activity: Defer     Partners: Female     Birth control/protection: None          Hospitalizations and Urgent Care Since Last Assessment  ED Visits, Admissions, or Hospitalizations: none  Urgent Office Visits: none    Allergies  Known allergies and reactions were discussed with the patient. The patient's chart has been reviewed for allergy information and updated as necessary.   Allergies   Allergen Reactions    Ciprofloxacin Anaphylaxis and Hives    Miralax [Polyethylene Glycol] Itching and Rash    Mobic [Meloxicam] Other (See Comments)     Pt states, \"It make my feet and hands go numb and I can't hardly walk.\"     Paxil [Paroxetine Hcl] Shortness Of Breath     Chest pain     Peanut-Containing Drug Products Anaphylaxis    Penicillins Anaphylaxis    Pristiq [Desvenlafaxine Succinate Er] Dizziness    " Sulfa Antibiotics Anaphylaxis, Itching and Rash    Doxycycline Hives    Fish-Derived Products Hives    Isosorbide Nitrate Rash     Rash, hives, had to use inhaler.     Lyrica [Pregabalin] Hives    Movantik [Naloxegol] Rash    Seroquel [Quetiapine] Hives and Rash    Trulance [Plecanatide] Hives    Buspar [Buspirone] Rash    Clarithromycin Rash    Clindamycin/Lincomycin Rash    Codeine Rash    Contrast Dye (Echo Or Unknown Ct/Mr) Itching and Rash    Diltiazem Rash    Elavil [Amitriptyline] Rash    Flomax [Tamsulosin] Hives    Gabapentin Rash    Iodinated Contrast Media Itching and Rash    Keflex [Cephalexin] Rash    Levocetirizine Rash    Linzess [Linaclotide] Rash    Metoprolol Rash    Prednisone Rash and Other (See Comments)     Face, feet, and legs go completely numb per patient    Robitussin Cough+ Chest Max St [Dextromethorphan-Guaifenesin] Itching    Shrimp (Diagnostic) Rash    Spironolactone Rash    Viibryd [Vilazodone Hcl] Itching and Rash    Zoloft [Sertraline Hcl] Hives and Itching          Relevant Laboratory Values  Relevant laboratory values were discussed with the patient. The following specialty medication dose adjustment(s) are recommended: none    Lab Results   Component Value Date    GLUCOSE 104 (H) 06/06/2025    CALCIUM 8.6 06/06/2025     06/06/2025    K 3.6 06/06/2025    CO2 20.8 (L) 06/06/2025     06/06/2025    BUN 17.0 06/06/2025    CREATININE 1.05 06/06/2025    EGFRIFAFRI  08/26/2016      Comment:      <15 Indicative of kidney failure.    EGFRIFNONA 66 02/02/2022    BCR 16.2 06/06/2025    ANIONGAP 10.2 06/06/2025     Lab Results   Component Value Date    CHOL 129 06/06/2025    CHLPL 232 (H) 04/05/2016    TRIG 80 06/06/2025    HDL 58 06/06/2025    LDL 55 06/06/2025       Current Medication List  This medication list has been reviewed with the patient and evaluated for any interactions or necessary modifications/recommendations, and updated to include all prescription medications, OTC  medications, and supplements the patient is currently taking.  This list reflects what is contained in the patient's profile, which has also been marked as reviewed to communicate to other providers it is the most up to date version of the patient's current medication therapy.     Current Outpatient Medications:     albuterol sulfate  (90 Base) MCG/ACT inhaler, Inhale 2 puffs by mouth every 4 hours as needed., Disp: 18 g, Rfl: 3    Alcohol Swabs (Alcohol Prep) 70 % pads, Use 1 each 2 (Two) Times a Day., Disp: 60 each, Rfl: 5    aspirin (Aspirin EC Adult Low Dose) 81 MG EC tablet, Take 1 tablet by mouth Daily., Disp: 30 tablet, Rfl: 3    azelastine (ASTELIN) 0.1 % nasal spray, Administer 1 spray into the nostril(s) as directed by provider 2 (Two) Times a Day., Disp: 30 mL, Rfl: 5    Benadryl Allergy 25 MG tablet, Take 1-2 tablets by mouth Every 8 (Eight) Hours As Needed for Itching (or rash)., Disp: 120 tablet, Rfl: 2    cloNIDine (Catapres) 0.1 MG tablet, Take 1 tablet by mouth 2 (Two) Times a Day As Needed for High Blood Pressure (if bp more than 140/90)., Disp: 60 tablet, Rfl: 1    dexAMETHasone (DECADRON) 4 MG tablet, Take 1 tablet by mouth 2 (Two) Times a Day With Meals., Disp: 6 tablet, Rfl: 0    dexAMETHasone (DECADRON) 4 MG/ML injection, Use 1 mL at Physical Therapy for ionthophoresis., Disp: 40 mL, Rfl: 0    dexlansoprazole (Dexilant) 60 MG capsule, Take 1 capsule by mouth 2 (Two) Times a Day., Disp: 60 capsule, Rfl: 5    Diclofenac Sodium (VOLTAREN) 1 % gel gel, Apply 2 grams topically to the affect area as directed 4 (Four) Times a Day., Disp: 200 g, Rfl: 2    Dilantin 100 MG capsule, Take 2 capsules by mouth 2 (Two) Times a Day., Disp: 120 capsule, Rfl: 5    docusate calcium (SURFAK) 240 MG capsule, Take 1 capsule by mouth 2 (Two) Times a Day As Needed for Constipation., Disp: 60 capsule, Rfl: 2    Elastic Bandages & Supports (ACE Ankle Brace) misc, 1 each Daily., Disp: 1 each, Rfl: 0     EPINEPHrine (EPIPEN) 0.3 MG/0.3ML solution auto-injector injection, Inject Intramuscularly into lateral thigh as needed for treatment of anaphylaxis, then go to the ER or call 911., Disp: 2 each, Rfl: 2    ergocalciferol (ERGOCALCIFEROL) 1.25 MG (57097 UT) capsule, Take 1 capsule by mouth 1 (One) Time Per Week., Disp: 4 capsule, Rfl: 5    Evolocumab (REPATHA) solution auto-injector SureClick injection, Inject 1 mL under the skin into the appropriate area as directed Every 14 (Fourteen) Days., Disp: 2 mL, Rfl: 5    fluticasone (FLONASE) 50 MCG/ACT nasal spray, Use 1 spray in each nostril Twice a day for 30 days, Disp: 16 g, Rfl: 11    fluticasone (Flovent HFA) 110 MCG/ACT inhaler, Inhale 1 puff by mouth 2 (Two) Times a Day., Disp: 12 g, Rfl: 5    fluticasone (FLOVENT HFA) 44 MCG/ACT inhaler, Inhale 1 puff by mouth twice a day., Disp: 10.6 g, Rfl: 5    glucose blood (OneTouch Ultra) test strip, Use as instructed 2 times daily and as needed, Disp: 100 each, Rfl: 3    HYDROcodone-acetaminophen (NORCO) 5-325 MG per tablet, Take 1 tablet by mouth Every 8 (Eight) Hours for 10 days  (max daily dose of 3 tablets)., Disp: 30 tablet, Rfl: 0    HYDROcodone-acetaminophen (Norco) 7.5-325 MG per tablet, Take 1 tablet by mouth every 6 (six) hours as needed for pain for up to 10 days. Max Daily Amount: 4 tablets, Disp: 25 tablet, Rfl: 0    ibuprofen (ADVIL,MOTRIN) 800 MG tablet, Take 1 tablet by mouth 2 (Two) Times a Day As Needed., Disp: 40 tablet, Rfl: 0    ibuprofen (ADVIL,MOTRIN) 800 MG tablet, Take 1 tablet by mouth Every 6 (Six) Hours As Needed., Disp: 40 tablet, Rfl: 1    ipratropium-albuterol (Combivent Respimat)  MCG/ACT inhaler, INHALE 1 PUFF BY MOUTH 4 (FOUR) TIMES A DAY AS NEEDED FOR WHEEZING., Disp: 4 g, Rfl: 5    ipratropium-albuterol (DUO-NEB) 0.5-2.5 mg/3 ml nebulizer, Inhale the contents of 3 mL vial nebulizer every 6 hours as needed., Disp: 360 mL, Rfl: 3    Lancets (OneTouch Delica Plus [id number]) misc, Use  as instructed 2 times daily and as needed, Disp: 100 each, Rfl: 3    levocetirizine (XYZAL) 5 MG tablet, Take 1 tablet by mouth in the evening Once a day 30 days, Disp: 30 tablet, Rfl: 11    lisinopril (PRINIVIL,ZESTRIL) 30 MG tablet, Take 1 tablet by mouth Daily for blood pressure., Disp: 90 tablet, Rfl: 1    loratadine (CLARITIN) 10 MG tablet, Take 1 tablet by mouth Daily As Needed for Allergies., Disp: 30 tablet, Rfl: 5    methocarbamol (ROBAXIN) 750 MG tablet, Take 1 tablet by mouth 3 (Three) Times a Day., Disp: 90 tablet, Rfl: 0    mirtazapine (REMERON) 30 MG tablet, Take 1 & 1/2  tablets by mouth Every Night., Disp: 45 tablet, Rfl: 5    montelukast (SINGULAIR) 10 MG tablet, Take 1 tablet by mouth Every Night., Disp: 90 tablet, Rfl: 2    mupirocin (BACTROBAN) 2 % ointment, Apply 1 Application topically to the appropriate area as directed 3 (Three) Times a Day., Disp: 30 g, Rfl: 0    ondansetron (Zofran) 4 MG tablet, Take 1 tablet by mouth Every 8 (Eight) Hours As Needed for Nausea., Disp: 20 tablet, Rfl: 0    phentermine (Adipex-P) 37.5 MG tablet, Take 1 tablet by mouth Every Morning Before Breakfast., Disp: 30 tablet, Rfl: 0    riFAXIMin (Xifaxan) 550 MG tablet, Take 1 tablet by mouth Daily., Disp: , Rfl:     rosuvastatin (Crestor) 40 MG tablet, Take 1 tablet by mouth Daily., Disp: 30 tablet, Rfl: 5    sodium chloride 0.65 % nasal spray, Use 1-3 sprays in each nostril Three times a day as needed 30 days, Disp: 44 mL, Rfl: 11    tamsulosin (FLOMAX) 0.4 MG capsule 24 hr capsule, Take 1 capsule by mouth Daily., Disp: 90 capsule, Rfl: 3    temazepam (RESTORIL) 30 MG capsule, Take 1 capsule by mouth At Night As Needed for Sleep., Disp: 30 capsule, Rfl: 1    vitamin C (ASCORBIC ACID) 500 MG tablet, Take 1 tablet by mouth 2 (Two) Times a Day., Disp: 60 tablet, Rfl: 5    Vonoprazan Fumarate (Voquezna) 20 MG tablet, Take 1 tablet by mouth Daily., Disp: , Rfl:          Drug Interactions  No significant drug-drug  interactions with Repatha according to literature.     Adverse Drug Reactions        Plan for ADR Management: n/a    Adherence, Self-Administration, and Current Therapy Problems  Adherence related to the patient's specialty therapy was discussed with the patient. The Adherence segment of this outreach has been reviewed and updated.          Additional Barriers to Patient Self-Administration: patient refuses to administer himself  Methods for Supporting Patient Self-Administration: patient comes to [facility] clinic every 2 weeks for injections    Open Medication Therapy Problems  No medication therapy recommendations to display    Goals of Therapy  Goals related to the patient's specialty therapy were discussed with the patient. The Patient Goals segment of this outreach has been reviewed and updated.   Goals Addressed Today    None     LDL was 136 mg/dl on 3/20/24 and has improved to 132 mg/ml on 10/17/24. This lab work was completed prior to starting repatha therapy.    Quality of Life Assessment   Quality of Life related to the patient's enrollment in the patient management program and services provided was discussed with the patient. The QOL segment of this outreach has been reviewed and updated.       Reassessment Plan & Follow-Up  1.Medication Therapy Changes: Patient will continue Repatha 140mg every 2 weeks    A. I administered injection in to the back of his RIGHT arm   2. Related Plans, Therapy Recommendations, or Issues to Be Addressed: none  3. Pharmacist to perform regular assessments no more than (6) months from the previous assessment.  Patient will continue regular follow-up with referring provider.   4. Care Coordinator to set up future refill outreaches, coordinate prescription delivery, and escalate clinical questions to pharmacist.  5. Patient continues to have a problem that the clinic staff ask his name and  during his appt.  Patient has been advised multiple times that before any injection is  provided that his name and  are 2 identifying factors that will be used as well as always confirming the medication and that he has continued tolerating the medication since last injection and that this is important for his safety.  Patient is aware that these injections can be done at home, however, he declines this as an option.   6. Patient is aware that if he receives any other injections, such as allergy shots, that the area of the Repatha needs to be avoided and other injection sites used if possible so that if a reaction to either ever occur that there will be no question what injection it was.       Attestation      I attest the patient was actively involved in and has agreed to the above plan of care.  If the prescribed therapy is at any point deemed not appropriate based on the current or future assessments, a consultation will be initiated with the patient's specialty care provider to determine the best course of action. The revised plan of therapy will be documented along with any required assessments and/or additional patient education provided.     Patient to follow up for injections in Clinic every 2 weeks    Grecia Langston. Gopal, PharmD  2025  13:41 EDT

## 2025-07-03 ENCOUNTER — OFFICE VISIT (OUTPATIENT)
Dept: FAMILY MEDICINE CLINIC | Facility: CLINIC | Age: 55
End: 2025-07-03
Payer: COMMERCIAL

## 2025-07-03 VITALS
HEIGHT: 67 IN | SYSTOLIC BLOOD PRESSURE: 102 MMHG | OXYGEN SATURATION: 99 % | WEIGHT: 232.4 LBS | DIASTOLIC BLOOD PRESSURE: 54 MMHG | RESPIRATION RATE: 18 BRPM | BODY MASS INDEX: 36.47 KG/M2 | HEART RATE: 94 BPM

## 2025-07-03 DIAGNOSIS — E66.01 CLASS 2 SEVERE OBESITY DUE TO EXCESS CALORIES WITH SERIOUS COMORBIDITY AND BODY MASS INDEX (BMI) OF 36.0 TO 36.9 IN ADULT: ICD-10-CM

## 2025-07-03 DIAGNOSIS — I25.10 ASCVD (ARTERIOSCLEROTIC CARDIOVASCULAR DISEASE): ICD-10-CM

## 2025-07-03 DIAGNOSIS — E66.812 CLASS 2 SEVERE OBESITY DUE TO EXCESS CALORIES WITH SERIOUS COMORBIDITY AND BODY MASS INDEX (BMI) OF 36.0 TO 36.9 IN ADULT: ICD-10-CM

## 2025-07-03 DIAGNOSIS — F51.04 PSYCHOPHYSIOLOGICAL INSOMNIA: ICD-10-CM

## 2025-07-03 DIAGNOSIS — Q24.5 CONGENITAL CORONARY ARTERY ANOMALY: Primary | ICD-10-CM

## 2025-07-03 DIAGNOSIS — R56.9 SEIZURES: ICD-10-CM

## 2025-07-03 PROCEDURE — 3074F SYST BP LT 130 MM HG: CPT | Performed by: NURSE PRACTITIONER

## 2025-07-03 PROCEDURE — 99214 OFFICE O/P EST MOD 30 MIN: CPT | Performed by: NURSE PRACTITIONER

## 2025-07-03 PROCEDURE — 1125F AMNT PAIN NOTED PAIN PRSNT: CPT | Performed by: NURSE PRACTITIONER

## 2025-07-03 PROCEDURE — 3078F DIAST BP <80 MM HG: CPT | Performed by: NURSE PRACTITIONER

## 2025-07-03 RX ORDER — TEMAZEPAM 30 MG/1
30 CAPSULE ORAL NIGHTLY PRN
Qty: 30 CAPSULE | Refills: 1 | Status: SHIPPED | OUTPATIENT
Start: 2025-07-03

## 2025-07-03 RX ORDER — METRONIDAZOLE 250 MG/1
250 TABLET ORAL 3 TIMES DAILY
COMMUNITY
End: 2025-07-03

## 2025-07-03 RX ORDER — METRONIDAZOLE 500 MG/1
500 TABLET ORAL 3 TIMES DAILY
COMMUNITY
End: 2025-07-03

## 2025-07-03 RX ORDER — SEMAGLUTIDE 0.25 MG/.5ML
0.25 INJECTION, SOLUTION SUBCUTANEOUS WEEKLY
Qty: 2 ML | Refills: 0 | Status: SHIPPED | OUTPATIENT
Start: 2025-07-03 | End: 2025-07-25

## 2025-07-03 RX ORDER — PHENTERMINE HYDROCHLORIDE 37.5 MG/1
37.5 TABLET ORAL
Qty: 30 TABLET | Refills: 0 | Status: SHIPPED | OUTPATIENT
Start: 2025-07-03

## 2025-07-03 RX ORDER — DEXAMETHASONE 2 MG/1
2 TABLET ORAL 2 TIMES DAILY WITH MEALS
COMMUNITY
End: 2025-07-03

## 2025-07-03 RX ORDER — PHENYTOIN SODIUM 100 MG/1
200 CAPSULE, EXTENDED RELEASE ORAL 2 TIMES DAILY
Qty: 120 CAPSULE | Refills: 5 | Status: SHIPPED | OUTPATIENT
Start: 2025-07-03

## 2025-07-03 NOTE — PROGRESS NOTES
Subjective   Jaquan Mckay is a 54 y.o. male.     Chief Complaint   Patient presents with    Hypertension       History of Present Illness     HTN-ongoing.  Under care of cardiology.   He denies CP.  He continues Lisinopril 30 mg.  His cardio provider would prefer he stop Adipex and be on Wegovy for cardiovascular protection.    Obesity-ongoing.  On Adipex intermittently but has had several injuries causing him to be inconsistently.  He tries to be active as his joint pain and foot pain will allow.   He has had a re injury to his foot so he has not been active for about 2 weeks.    Right foot injury-reports that he stepped onto a rock with his right foot and it rolled over.  He reports that he felt and heard a pop.  He was out walking around for exercise when it happened.  He reports that he had to have assistance getting up.  EMS did evaluate him but he did not go to the hospital.  He reports later in the evening he had significant ankle swelling.  Xray negative for acute fracture.  He reports that his sock had to be cut for removal.  His leg was elevated and iced till swelling started to improve.  He was wearing his brace and high top sneaker when the incident occurred.  He did use a walking boot after seeing Dr Florez.  There is concern he torn his ligaments again.  He is advised to use an ACE wrap for compression.  He is to use walking boot PRN.    Allergies-ongoing.  Continues immunotherapy and is tolerating well.   He is on xyzal and singulair.  He is followed by Allergy/asthma specialist.  No negative side effects of meds. He has flonase but does not use it unless necessary.  He does get nasal sores at times.    Seizure disorder-on Dilantin.  No recent seizure activity that he is aware of.  No increase in HA.  No obvious side effects of medications.    CVD-ongoing.  He is on medication for cholesterol and HTN.  He also has intermittent CP and congenital coronary artery anomaly.  He has not been on Wegovy for  "CVD risk reduction.  He is a current smoker.      The following portions of the patient's history were reviewed and updated as appropriate: CC, ROS, allergies, current medications, past family history, past medical history, past social history, past surgical history and problem list.      Review of Systems   Constitutional:  Positive for fatigue. Negative for appetite change, unexpected weight gain and unexpected weight loss.   HENT:  Negative for congestion, ear pain, postnasal drip, rhinorrhea, sore throat, swollen glands, trouble swallowing and voice change.    Eyes:  Negative for pain and visual disturbance.   Respiratory:  Negative for cough, chest tightness, shortness of breath and wheezing.    Cardiovascular:  Negative for chest pain, palpitations and leg swelling.   Gastrointestinal:  Negative for abdominal pain, blood in stool, constipation, diarrhea, nausea and indigestion.   Genitourinary:  Negative for dysuria, hematuria and urgency.   Musculoskeletal:  Positive for arthralgias, back pain, gait problem and myalgias. Negative for joint swelling.   Skin:  Negative for color change and skin lesions.   Allergic/Immunologic: Negative.    Neurological:  Negative for dizziness, numbness and headache.   Hematological: Negative.    Psychiatric/Behavioral:  Negative for dysphoric mood, sleep disturbance and suicidal ideas. The patient is not nervous/anxious.    All other systems reviewed and are negative.      Objective     /54   Pulse 94   Resp 18   Ht 170.2 cm (67\")   Wt 105 kg (232 lb 6.4 oz)   SpO2 99%   BMI 36.40 kg/m²     Physical Exam  Vitals reviewed.   Constitutional:       General: He is not in acute distress.     Appearance: He is well-developed. He is obese. He is not diaphoretic.   HENT:      Head: Normocephalic and atraumatic.      Jaw: No tenderness.      Right Ear: Hearing, tympanic membrane, ear canal and external ear normal.      Left Ear: Hearing, tympanic membrane, ear canal and " external ear normal.      Nose: Nose normal. No nasal tenderness or congestion.      Right Sinus: No maxillary sinus tenderness or frontal sinus tenderness.      Left Sinus: No maxillary sinus tenderness or frontal sinus tenderness.      Mouth/Throat:      Lips: Pink.      Mouth: Mucous membranes are moist.      Pharynx: Oropharynx is clear. Uvula midline.   Eyes:      General: Lids are normal. No scleral icterus.     Extraocular Movements:      Right eye: Normal extraocular motion and no nystagmus.      Left eye: Normal extraocular motion and no nystagmus.      Conjunctiva/sclera: Conjunctivae normal.      Pupils: Pupils are equal, round, and reactive to light.   Neck:      Thyroid: No thyromegaly or thyroid tenderness.      Vascular: No carotid bruit or JVD.      Trachea: No tracheal tenderness.   Cardiovascular:      Rate and Rhythm: Normal rate and regular rhythm.      Pulses:           Dorsalis pedis pulses are 2+ on the right side and 2+ on the left side.        Posterior tibial pulses are 2+ on the right side and 2+ on the left side.      Heart sounds: Normal heart sounds, S1 normal and S2 normal. No murmur heard.  Pulmonary:      Effort: Pulmonary effort is normal. No accessory muscle usage, prolonged expiration or respiratory distress.      Breath sounds: Normal breath sounds.   Chest:      Chest wall: No tenderness.   Abdominal:      General: Bowel sounds are normal. There is no distension.      Palpations: Abdomen is soft. There is no hepatomegaly, splenomegaly or mass.      Tenderness: There is no abdominal tenderness.   Musculoskeletal:         General: Tenderness present.      Cervical back: Normal range of motion and neck supple.      Right lower leg: No edema.      Left lower leg: No edema.      Comments: No muscular atrophy or flaccidity.  Ankle in brace   Lymphadenopathy:      Head:      Right side of head: No submental or submandibular adenopathy.      Left side of head: No submental or  submandibular adenopathy.      Cervical: No cervical adenopathy.      Right cervical: No superficial cervical adenopathy.     Left cervical: No superficial cervical adenopathy.   Skin:     General: Skin is warm and dry.      Capillary Refill: Capillary refill takes less than 2 seconds.      Coloration: Skin is not jaundiced or pale.      Findings: No erythema.      Nails: There is no clubbing.   Neurological:      Mental Status: He is alert and oriented to person, place, and time.      Cranial Nerves: No cranial nerve deficit or facial asymmetry.      Sensory: No sensory deficit.      Motor: No weakness, tremor, atrophy or abnormal muscle tone.      Coordination: Coordination normal.      Gait: Gait abnormal (mild antalgia).      Deep Tendon Reflexes: Reflexes are normal and symmetric.   Psychiatric:         Attention and Perception: He is attentive.         Mood and Affect: Mood normal. Mood is not anxious or depressed.         Speech: Speech normal.         Behavior: Behavior normal. Behavior is cooperative.         Thought Content: Thought content normal.         Cognition and Memory: Cognition normal.         Judgment: Judgment normal.         Diagnoses and all orders for this visit:    1. Congenital coronary artery anomaly (Primary)  Overview:  RCA noted on heart cath 07/13/2017 per Dr Murray at [facility].    Orders:  -     Semaglutide-Weight Management (Wegovy) 0.25 MG/0.5ML solution auto-injector; Inject 0.5 mL under the skin into the appropriate area as directed 1 (One) Time Per Week for 4 doses.  Dispense: 2 mL; Refill: 0    2. Psychophysiological insomnia  Comments:  Continue mirtazapine 45 mg.  continue Restoril to 30 mg.  Patient to report any negative side effects.  Continue under the [facility] for mental health support  Overview:  With depression and anxiety      Orders:  -     temazepam (RESTORIL) 30 MG capsule; Take 1 capsule by mouth At Night As Needed for Sleep.  Dispense: 30 capsule; Refill: 1    3. Class  2 severe obesity due to excess calories with serious comorbidity and body mass index (BMI) of 36.0 to 36.9 in adult  -     phentermine (Adipex-P) 37.5 MG tablet; Take 1 tablet by mouth Every Morning Before Breakfast.  Dispense: 30 tablet; Refill: 0    4. Seizures  Comments:  No known recent activity.  Continue Dilantin.  We will plan for an updated phenytoin level  Orders:  -     Dilantin 100 MG capsule; Take 2 capsules by mouth 2 (Two) Times a Day.  Dispense: 120 capsule; Refill: 5    5. ASCVD (arteriosclerotic cardiovascular disease)  -     Semaglutide-Weight Management (Wegovy) 0.25 MG/0.5ML solution auto-injector; Inject 0.5 mL under the skin into the appropriate area as directed 1 (One) Time Per Week for 4 doses.  Dispense: 2 mL; Refill: 0      Understands disease processes and need for medications.  Understands reasons for urgent and emergent care.  Patient (& family) verbalized agreement for treatment plan.   Emotional support and active listening provided.  Patient provided time to verbalize feelings.    CHAVEZ/PMSHAUNA reviewed today and consistent.  Will refill prescribed controlled medication today.  Patient is aware they cannot receive narcotics from any other provider except if under care of pain management or speciality clinic.  Risk and benefits of medication use has been reviewed.  History and physical exam exhibit continued safe and appropriate use of controlled substances.  The patient is aware of the potential for addiction and dependence.  This patient has been made aware of the appropriate use of such medications, including potential risk of somnolence, limited ability to drive and / or work safely, and potential for overdose.    It has also been made clear that these medications are for use by this patient only, without concomitant use of alcohol or other substances unless prescribed/advised by medical provider.  Patient understands they may be subject to UDS and pill counts at random.    Patient  considered to be low risk for addiction due to use of single controlled medications.  Patient understands and accepts these risks.  Patient need for medication will be reassessed at each visit.  Doses will be adjusted according to patient need and findings.    Goal of [place]: Patient will not have any adverse reactions of medication.    Patient will have improvement in sleep hygiene/pattern with use of Restoril as needed as directed.    CHAVEZ Patient Controlled Substance Report (from 7/3/2024 to 7/3/2025)    Dispensed  Strength Quantity Days Supply Provider Pharmacy   05/27/2025 Temazepam 30MG 30 each 30 [facility]...   05/07/2025 Phentermine Hcl 37.5MG 30 each 30 [facility]-...   04/09/2025 Temazepam 30MG 30 each 30 [facility]-...   03/12/2025 Temazepam 30MG 30 each 30 [facility]...   02/03/2025 Temazepam 30MG 30 each 30 [facility]        RTC 1 month, sooner if needed.           This document has been electronically signed by:  BALDOMERO Thao FNP-C Dragon disclaimer:  Part of this note may be an electronic transcription/translation of spoken language to printed text using the Dragon Dictation System.

## 2025-07-09 ENCOUNTER — SPECIALTY PHARMACY (OUTPATIENT)
Dept: PHARMACY | Facility: HOSPITAL | Age: 55
End: 2025-07-09
Payer: COMMERCIAL

## 2025-07-09 NOTE — PROGRESS NOTES
"   Medication Management Clinic/ Specialty Pharmacy Patient Management Program  Lipid Management Program - PCSK9i follow up Assessment     Jaquan Mckay is a 54 y.o. male referred by their provider, Tiera Valenzuela, to the Hyperlipidemia Patient Management program offered by [facility] Pharm[facility] for Lipid Management.  Jaquan Mckay is  treated for ASCVD and hyperlipidemia, and currently takes crestor 40 mg.  In the past, Pt has tried lipitor 40 mg, zocor 10 mg, pravastatin 80 mg and is not statin intolerant. The patient denies any allergies to latex.       A follow-up outreach was conducted, including assessment of continued therapy appropriateness, medication adherence, and side effect incidence and management for Repatha. Patient comes to clinic for injections and has not missed any doses.  Repatha was started on 10/29/24, and patient reports tolerating the medication well with no side effects.    He reports he tolerated last Repatha injection without any issues. He denies any side effects.     Changes to Insurance Coverage or Financial Support  [employer] [place] (no change)    Relevant Past Medical History and Comorbidities  Relevant medical history and concomitant health conditions were discussed with the patient. The patient's chart has been reviewed for relevant past medical history and comorbid health conditions and updated as necessary.   Past Medical History:   Diagnosis Date    Allergic     Anxiety     Arthritis     Asthma     Body piercing     REPORTS CYLICONE IN EARS    Clotting disorder 2004    had a knee surgery    Coronary artery disease     Depression     DVT (deep venous thrombosis)     RIGHT RIGHT KNEE AFTER SURGERY YEARS AGO IN 2001 OR 2004    Elevated cholesterol     Gastric ulcer     GERD (gastroesophageal reflux disease)     H/O migraine     Headache     Heart attack     REPORTS \"LIGHT HEART ATTACK A LONG TIME AGO\"  \"EARLY 90'S\"    History of " "seizures     REPORTS LAST EPISODE WAS AROUND 1995.    Hostility     Hyperlipidemia     Hypertension     Knee pain, acute     Left    Low back pain     Lyme disease     Migraine     MRSA (methicillin resistant Staphylococcus aureus)     REPORTS LAST TESTED + 2004. WAS TREATED HE REPORTS.  RIGHT ARM, RIGHT KNEE.    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     Obesity     Poor historian     [place] spotted fever     Seizures     Sleep apnea     Tattoo     Wears glasses      Social History     Socioeconomic History    Marital status:      Spouse name: Becca    Number of children: 2    Years of education: 12   Tobacco Use    Smoking status: Every Day     Current packs/day: 1.00     Average packs/day: 1 pack/day for 18.3 years (18.3 ttl pk-yrs)     Types: Cigars, Cigarettes     Start date: 4/11/2022     Passive exposure: Current    Smokeless tobacco: Never   Vaping Use    Vaping status: Never Used   Substance and Sexual Activity    Alcohol use: No    Drug use: No    Sexual activity: Defer     Partners: Female     Birth control/protection: None          Hospitalizations and Urgent Care Since Last Assessment  ED Visits, Admissions, or Hospitalizations: none  Urgent Office Visits: none    Allergies  Known allergies and reactions were discussed with the patient. The patient's chart has been reviewed for allergy information and updated as necessary.   Allergies   Allergen Reactions    Ciprofloxacin Anaphylaxis and Hives    Miralax [Polyethylene Glycol] Itching and Rash    Mobic [Meloxicam] Other (See Comments)     Pt states, \"It make my feet and hands go numb and I can't hardly walk.\"     Paxil [Paroxetine Hcl] Shortness Of Breath     Chest pain     Peanut-Containing Drug Products Anaphylaxis    Penicillins Anaphylaxis    Pristiq [Desvenlafaxine Succinate Er] Dizziness    Sulfa Antibiotics Anaphylaxis, Itching and Rash    Doxycycline Hives    Fish-Derived Products Hives    Isosorbide Nitrate Rash     Rash, hives, had to use " inhaler.     Lyrica [Pregabalin] Hives    Movantik [Naloxegol] Rash    Seroquel [Quetiapine] Hives and Rash    Trulance [Plecanatide] Hives    Buspar [Buspirone] Rash    Clarithromycin Rash    Clindamycin/Lincomycin Rash    Codeine Rash    Contrast Dye (Echo Or Unknown Ct/Mr) Itching and Rash    Diltiazem Rash    Elavil [Amitriptyline] Rash    Flomax [Tamsulosin] Hives    Gabapentin Rash    Iodinated Contrast Media Itching and Rash    Keflex [Cephalexin] Rash    Levocetirizine Rash    Linzess [Linaclotide] Rash    Metoprolol Rash    Prednisone Rash and Other (See Comments)     Face, feet, and legs go completely numb per patient    Robitussin Cough+ Chest Max St [Dextromethorphan-Guaifenesin] Itching    Shrimp (Diagnostic) Rash    Spironolactone Rash    Viibryd [Vilazodone Hcl] Itching and Rash    Zoloft [Sertraline Hcl] Hives and Itching          Relevant Laboratory Values  Relevant laboratory values were discussed with the patient. The following specialty medication dose adjustment(s) are recommended: none    Lab Results   Component Value Date    GLUCOSE 104 (H) 06/06/2025    CALCIUM 8.6 06/06/2025     06/06/2025    K 3.6 06/06/2025    CO2 20.8 (L) 06/06/2025     06/06/2025    BUN 17.0 06/06/2025    CREATININE 1.05 06/06/2025    EGFRIFAFRI  08/26/2016      Comment:      <15 Indicative of kidney failure.    EGFRIFNONA 66 02/02/2022    BCR 16.2 06/06/2025    ANIONGAP 10.2 06/06/2025     Lab Results   Component Value Date    CHOL 129 06/06/2025    CHLPL 232 (H) 04/05/2016    TRIG 80 06/06/2025    HDL 58 06/06/2025    LDL 55 06/06/2025       Current Medication List  This medication list has been reviewed with the patient and evaluated for any interactions or necessary modifications/recommendations, and updated to include all prescription medications, OTC medications, and supplements the patient is currently taking.  This list reflects what is contained in the patient's profile, which has also been marked as  reviewed to communicate to other providers it is the most up to date version of the patient's current medication therapy.     Current Outpatient Medications:     albuterol sulfate  (90 Base) MCG/ACT inhaler, Inhale 2 puffs by mouth every 4 hours as needed., Disp: 18 g, Rfl: 3    Alcohol Swabs (Alcohol Prep) 70 % pads, Use 1 each 2 (Two) Times a Day., Disp: 60 each, Rfl: 5    aspirin (Aspirin EC Adult Low Dose) 81 MG EC tablet, Take 1 tablet by mouth Daily., Disp: 30 tablet, Rfl: 3    azelastine (ASTELIN) 0.1 % nasal spray, Administer 1 spray into the nostril(s) as directed by provider 2 (Two) Times a Day., Disp: 30 mL, Rfl: 5    Benadryl Allergy 25 MG tablet, Take 1-2 tablets by mouth Every 8 (Eight) Hours As Needed for Itching (or rash)., Disp: 120 tablet, Rfl: 2    cloNIDine (Catapres) 0.1 MG tablet, Take 1 tablet by mouth 2 (Two) Times a Day As Needed for High Blood Pressure (if bp more than 140/90)., Disp: 60 tablet, Rfl: 1    dexAMETHasone (DECADRON) 4 MG/ML injection, Use 1 mL at Physical Therapy for ionthophoresis., Disp: 40 mL, Rfl: 0    dexlansoprazole (Dexilant) 60 MG capsule, Take 1 capsule by mouth 2 (Two) Times a Day., Disp: 60 capsule, Rfl: 5    Diclofenac Sodium (VOLTAREN) 1 % gel gel, Apply 2 grams topically to the affect area as directed 4 (Four) Times a Day., Disp: 200 g, Rfl: 2    Dilantin 100 MG capsule, Take 2 capsules by mouth 2 (Two) Times a Day., Disp: 120 capsule, Rfl: 5    docusate calcium (SURFAK) 240 MG capsule, Take 1 capsule by mouth 2 (Two) Times a Day As Needed for Constipation., Disp: 60 capsule, Rfl: 2    Elastic Bandages & Supports (ACE Ankle Brace) misc, 1 each Daily., Disp: 1 each, Rfl: 0    EPINEPHrine (EPIPEN) 0.3 MG/0.3ML solution auto-injector injection, Inject Intramuscularly into lateral thigh as needed for treatment of anaphylaxis, then go to the ER or call 911., Disp: 2 each, Rfl: 2    ergocalciferol (ERGOCALCIFEROL) 1.25 MG (60562 UT) capsule, Take 1 capsule by  mouth 1 (One) Time Per Week., Disp: 4 capsule, Rfl: 5    Evolocumab (REPATHA) solution auto-injector SureClick injection, Inject 1 mL under the skin into the appropriate area as directed Every 14 (Fourteen) Days., Disp: 2 mL, Rfl: 5    fluticasone (FLONASE) 50 MCG/ACT nasal spray, Use 1 spray in each nostril Twice a day for 30 days, Disp: 16 g, Rfl: 11    fluticasone (Flovent HFA) 110 MCG/ACT inhaler, Inhale 1 puff by mouth 2 (Two) Times a Day., Disp: 12 g, Rfl: 5    fluticasone (FLOVENT HFA) 44 MCG/ACT inhaler, Inhale 1 puff by mouth twice a day., Disp: 10.6 g, Rfl: 5    glucose blood (OneTouch Ultra) test strip, Use as instructed 2 times daily and as needed, Disp: 100 each, Rfl: 3    HYDROcodone-acetaminophen (NORCO) 5-325 MG per tablet, Take 1 tablet by mouth Every 8 (Eight) Hours for 10 days  (max daily dose of 3 tablets)., Disp: 30 tablet, Rfl: 0    ibuprofen (ADVIL,MOTRIN) 800 MG tablet, Take 1 tablet by mouth Every 6 (Six) Hours As Needed., Disp: 40 tablet, Rfl: 1    ipratropium-albuterol (Combivent Respimat)  MCG/ACT inhaler, INHALE 1 PUFF BY MOUTH 4 (FOUR) TIMES A DAY AS NEEDED FOR WHEEZING., Disp: 4 g, Rfl: 5    ipratropium-albuterol (DUO-NEB) 0.5-2.5 mg/3 ml nebulizer, Inhale the contents of 3 mL vial nebulizer every 6 hours as needed., Disp: 360 mL, Rfl: 3    Lancets (OneTouch Delica Plus [id number]) misc, Use as instructed 2 times daily and as needed, Disp: 100 each, Rfl: 3    levocetirizine (XYZAL) 5 MG tablet, Take 1 tablet by mouth in the evening Once a day 30 days, Disp: 30 tablet, Rfl: 11    lisinopril (PRINIVIL,ZESTRIL) 30 MG tablet, Take 1 tablet by mouth Daily for blood pressure., Disp: 90 tablet, Rfl: 1    loratadine (CLARITIN) 10 MG tablet, Take 1 tablet by mouth Daily As Needed for Allergies., Disp: 30 tablet, Rfl: 5    methocarbamol (ROBAXIN) 750 MG tablet, Take 1 tablet by mouth 3 (Three) Times a Day., Disp: 90 tablet, Rfl: 0    mirtazapine (REMERON) 30 MG tablet, Take 1 & 1/2  tablets  by mouth Every Night., Disp: 45 tablet, Rfl: 5    montelukast (SINGULAIR) 10 MG tablet, Take 1 tablet by mouth Every Night., Disp: 90 tablet, Rfl: 2    mupirocin (BACTROBAN) 2 % ointment, Apply 1 Application topically to the appropriate area as directed 3 (Three) Times a Day., Disp: 30 g, Rfl: 0    ondansetron (Zofran) 4 MG tablet, Take 1 tablet by mouth Every 8 (Eight) Hours As Needed for Nausea., Disp: 20 tablet, Rfl: 0    phentermine (Adipex-P) 37.5 MG tablet, Take 1 tablet by mouth Every Morning Before Breakfast., Disp: 30 tablet, Rfl: 0    rosuvastatin (Crestor) 40 MG tablet, Take 1 tablet by mouth Daily., Disp: 30 tablet, Rfl: 5    Semaglutide-Weight Management (Wegovy) 0.25 MG/0.5ML solution auto-injector, Inject 0.5 mL under the skin into the appropriate area as directed 1 (One) Time Per Week for 4 doses., Disp: 2 mL, Rfl: 0    sodium chloride 0.65 % nasal spray, Use 1-3 sprays in each nostril Three times a day as needed 30 days, Disp: 44 mL, Rfl: 11    tamsulosin (FLOMAX) 0.4 MG capsule 24 hr capsule, Take 1 capsule by mouth Daily., Disp: 90 capsule, Rfl: 3    temazepam (RESTORIL) 30 MG capsule, Take 1 capsule by mouth At Night As Needed for Sleep., Disp: 30 capsule, Rfl: 1    vitamin C (ASCORBIC ACID) 500 MG tablet, Take 1 tablet by mouth 2 (Two) Times a Day., Disp: 60 tablet, Rfl: 5    Vonoprazan Fumarate (Voquezna) 20 MG tablet, Take 1 tablet by mouth Daily., Disp: , Rfl:          Drug Interactions  No significant drug-drug interactions with Repatha according to literature.     Adverse Drug Reactions        Plan for ADR Management: n/a    Adherence, Self-Administration, and Current Therapy Problems  Adherence related to the patient's specialty therapy was discussed with the patient. The Adherence segment of this outreach has been reviewed and updated.          Additional Barriers to Patient Self-Administration: patient refuses to administer himself  Methods for Supporting Patient Self-Administration:  patient [facility] every 2 weeks for injections    Open Medication Therapy Problems  No medication therapy recommendations to display    Goals of Therapy  Goals related to the patient's specialty therapy were discussed with the patient. The Patient Goals segment of this outreach has been reviewed and updated.   Goals Addressed Today    None     LDL was 136 mg/dl on 3/20/24 and has improved to 132 mg/ml on 10/17/24. This lab work was completed prior to starting repatha therapy.    Quality of Life Assessment   Quality of Life related to the patient's enrollment in the patient management program and services provided was discussed with the patient. The QOL segment of this outreach has been reviewed and updated.       Reassessment Plan & Follow-Up  1.Medication Therapy Changes: Patient will continue Repatha 140mg every 2 weeks    A. I administered injection in to the back of his RIGHT arm   2. Related Plans, Therapy Recommendations, or Issues to Be Addressed: none  3. Pharmacist to perform regular assessments no more than (6) months from the previous assessment.  Patient will continue regular follow-up with referring provider.   4. Care Coordinator to set up future refill outreaches, coordinate prescription delivery, and escalate clinical questions to pharmacist.  5. Patient continues to have a problem that the clinic staff ask his name and  during his appt.  Patient has been advised multiple times that before any injection is provided that his name and  are 2 identifying factors that will be used as well as always confirming the medication and that he has continued tolerating the medication since last injection and that this is important for his safety.  Patient is aware that these injections can be done at home, however, he declines this as an option.   6. Patient is aware that if he receives any other injections, such as allergy shots, that the area of the Repatha needs to be avoided and other  injection sites used if possible so that if a reaction to either ever occur that there will be no question what injection it was.       Attestation      I attest the patient was actively involved in and has agreed to the above plan of care.  If the prescribed therapy is at any point deemed not appropriate based on the current or future assessments, a consultation will be initiated with the patient's specialty care provider to determine the best course of action. The revised plan of therapy will be documented along with any required assessments and/or additional patient education provided.     Patient to follow up for injections in Clinic every 2 weeks    Melinda Case, PharmD  7/9/2025  13:36 EDT

## 2025-07-17 ENCOUNTER — SPECIALTY PHARMACY (OUTPATIENT)
Dept: PHARMACY | Facility: HOSPITAL | Age: 55
End: 2025-07-17
Payer: COMMERCIAL

## 2025-07-17 NOTE — PROGRESS NOTES
Specialty Pharmacy Patient Management Program  Refill Outreach     Jaquan was contacted today regarding refills of their medication(s).    Refill Questions      Flowsheet Row Most Recent Value   Changes to allergies? No   Changes to medications? No   New conditions or infections since last clinic visit No   Unplanned office visit, urgent care, ED, or hospital admission in the last 4 weeks  No   How does patient/caregiver feel medication is working? Good   Financial problems or insurance changes  No   Since the previous refill, were any specialty medication doses or scheduled injections missed or delayed?  No   Does this patient require a clinical escalation to a pharmacist? No            Delivery Questions      Flowsheet Row Most Recent Value   Delivery method  at Pharmacy   Delivery address Prescription   Medication(s) being filled and delivered Evolocumab (REPATHA)   Copay verified? Yes   Copay amount $0   Copay form of payment No copayment ($0)   Signature Required No                 Follow-up: 28 day(s)     Ruth Yip, Pharmacy Technician  7/17/2025  09:46 EDT

## 2025-07-18 ENCOUNTER — LAB (OUTPATIENT)
Dept: LAB | Facility: HOSPITAL | Age: 55
End: 2025-07-18
Payer: COMMERCIAL

## 2025-07-18 ENCOUNTER — TRANSCRIBE ORDERS (OUTPATIENT)
Dept: ADMINISTRATIVE | Facility: HOSPITAL | Age: 55
End: 2025-07-18
Payer: COMMERCIAL

## 2025-07-18 DIAGNOSIS — R19.7 DIARRHEA, UNSPECIFIED TYPE: Primary | ICD-10-CM

## 2025-07-18 DIAGNOSIS — R19.7 DIARRHEA, UNSPECIFIED TYPE: ICD-10-CM

## 2025-07-18 PROCEDURE — 82653 EL-1 FECAL QUANTITATIVE: CPT

## 2025-07-18 PROCEDURE — 83993 ASSAY FOR CALPROTECTIN FECAL: CPT

## 2025-07-22 LAB
CALPROTECTIN STL-MCNT: 161 UG/G (ref 0–120)
ELASTASE PANC STL-MCNT: 291 UG ELAST./G

## 2025-07-23 ENCOUNTER — SPECIALTY PHARMACY (OUTPATIENT)
Dept: PHARMACY | Facility: HOSPITAL | Age: 55
End: 2025-07-23
Payer: COMMERCIAL

## 2025-07-23 NOTE — PROGRESS NOTES
"   [facility]ty Pharmacy Patient Management Program  Lipid Management Program - [id number] follow up Assessment     Jaquan Mckay is a 54 y.o. male referred by their provider, Tiera Valenzuela, to the Hyperlipidemia Patient Management program offered by [facility] for Lipid Management.  Jaquan Mckay is  treated for ASCVD and hyperlipidemia, and currently takes crestor 40 mg.  In the past, Pt has tried lipitor 40 mg, zocor 10 mg, pravastatin 80 mg and is not statin intolerant. The patient denies any allergies to latex.       A follow-up outreach was conducted, including assessment of continued therapy appropriateness, medication adherence, and side effect incidence and management for Repatha. Patient comes to clinic for injections and has not missed any doses.  Repatha was started on 10/29/24, and patient reports tolerating the medication well with no side effects.    He reports he tolerated last Repatha injection without any issues. He denies any side effects.     Changes to Insurance Coverage or Financial Support  [employer] [place] (no change)    Relevant Past Medical History and Comorbidities  Relevant medical history and concomitant health conditions were discussed with the patient. The patient's chart has been reviewed for relevant past medical history and comorbid health conditions and updated as necessary.   Past Medical History:   Diagnosis Date    Allergic     Anxiety     Arthritis     Asthma     Body piercing     REPORTS CYLICONE IN EARS    Clotting disorder 2004    had a knee surgery    Coronary artery disease     Depression     DVT (deep venous thrombosis)     RIGHT RIGHT KNEE AFTER SURGERY YEARS AGO IN 2001 OR 2004    Elevated cholesterol     Gastric ulcer     GERD (gastroesophageal reflux disease)     H/O migraine     Headache     Heart attack     REPORTS \"LIGHT HEART ATTACK A LONG TIME AGO\"  \"EARLY 90'S\"    History of " "seizures     REPORTS LAST EPISODE WAS AROUND 1995.    Hostility     Hyperlipidemia     Hypertension     Knee pain, acute     Left    Low back pain     Lyme disease     Migraine     MRSA (methicillin resistant Staphylococcus aureus)     REPORTS LAST TESTED + 2004. WAS TREATED HE REPORTS.  RIGHT ARM, RIGHT KNEE.    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     Obesity     Poor historian     [place] spotted fever     Seizures     Sleep apnea     Tattoo     Wears glasses      Social History     Socioeconomic History    Marital status:      Spouse name: Becca    Number of children: 2    Years of education: 12   Tobacco Use    Smoking status: Every Day     Current packs/day: 1.00     Average packs/day: 1 pack/day for 18.4 years (18.4 ttl pk-yrs)     Types: Cigars, Cigarettes     Start date: 4/11/2022     Passive exposure: Current    Smokeless tobacco: Never   Vaping Use    Vaping status: Never Used   Substance and Sexual Activity    Alcohol use: No    Drug use: No    Sexual activity: Defer     Partners: Female     Birth control/protection: None          Hospitalizations and Urgent Care Since Last Assessment  ED Visits, Admissions, or Hospitalizations: none  Urgent Office Visits: none    Allergies  Known allergies and reactions were discussed with the patient. The patient's chart has been reviewed for allergy information and updated as necessary.   Allergies   Allergen Reactions    Ciprofloxacin Anaphylaxis and Hives    Metronidazole Rash    Miralax [Polyethylene Glycol] Itching and Rash    Mobic [Meloxicam] Other (See Comments)     Pt states, \"It make my feet and hands go numb and I can't hardly walk.\"     Paxil [Paroxetine Hcl] Shortness Of Breath     Chest pain     Peanut-Containing Drug Products Anaphylaxis    Penicillins Anaphylaxis    Pristiq [Desvenlafaxine Succinate Er] Dizziness    Sulfa Antibiotics Anaphylaxis, Itching and Rash    Doxycycline Hives    Fish-Derived Products Hives    Isosorbide Nitrate Rash     Rash, " hives, had to use inhaler.     Lyrica [Pregabalin] Hives    Movantik [Naloxegol] Rash    Seroquel [Quetiapine] Hives and Rash    Trulance [Plecanatide] Hives    Buspar [Buspirone] Rash    Clarithromycin Rash    Clindamycin/Lincomycin Rash    Codeine Rash    Contrast Dye (Echo Or Unknown Ct/Mr) Itching and Rash    Diltiazem Rash    Elavil [Amitriptyline] Rash    Flomax [Tamsulosin] Hives    Gabapentin Rash    Iodinated Contrast Media Itching and Rash    Keflex [Cephalexin] Rash    Levocetirizine Rash    Linzess [Linaclotide] Rash    Metoprolol Rash    Prednisone Rash and Other (See Comments)     Face, feet, and legs go completely numb per patient    Robitussin Cough+ Chest Max St [Dextromethorphan-Guaifenesin] Itching    Shrimp (Diagnostic) Rash    Spironolactone Rash    Viibryd [Vilazodone Hcl] Itching and Rash    Zoloft [Sertraline Hcl] Hives and Itching     Allergies reviewed by Melinda Case, PharmD on 7/23/2025 at  1:49 PM    Relevant Laboratory Values  Relevant laboratory values were discussed with the patient. The following specialty medication dose adjustment(s) are recommended: none    Lab Results   Component Value Date    GLUCOSE 104 (H) 06/06/2025    CALCIUM 8.6 06/06/2025     06/06/2025    K 3.6 06/06/2025    CO2 20.8 (L) 06/06/2025     06/06/2025    BUN 17.0 06/06/2025    CREATININE 1.05 06/06/2025    EGFRIFAFRI  08/26/2016      Comment:      <15 Indicative of kidney failure.    EGFRIFNONA 66 02/02/2022    BCR 16.2 06/06/2025    ANIONGAP 10.2 06/06/2025     Lab Results   Component Value Date    CHOL 129 06/06/2025    CHLPL 232 (H) 04/05/2016    TRIG 80 06/06/2025    HDL 58 06/06/2025    LDL 55 06/06/2025       Current Medication List  This medication list has been reviewed with the patient and evaluated for any interactions or necessary modifications/recommendations, and updated to include all prescription medications, OTC medications, and supplements the patient is currently taking.   This list reflects what is contained in the patient's profile, which has also been marked as reviewed to communicate to other providers it is the most up to date version of the patient's current medication therapy.     Current Outpatient Medications:     albuterol sulfate  (90 Base) MCG/ACT inhaler, Inhale 2 puffs by mouth every 4 hours as needed., Disp: 18 g, Rfl: 3    Alcohol Swabs (Alcohol Prep) 70 % pads, Use 1 each 2 (Two) Times a Day., Disp: 60 each, Rfl: 5    aspirin (Aspirin EC Adult Low Dose) 81 MG EC tablet, Take 1 tablet by mouth Daily., Disp: 30 tablet, Rfl: 3    azelastine (ASTELIN) 0.1 % nasal spray, Administer 1 spray into the nostril(s) as directed by provider 2 (Two) Times a Day., Disp: 30 mL, Rfl: 5    Benadryl Allergy 25 MG tablet, Take 1-2 tablets by mouth Every 8 (Eight) Hours As Needed for Itching (or rash)., Disp: 120 tablet, Rfl: 2    cloNIDine (Catapres) 0.1 MG tablet, Take 1 tablet by mouth 2 (Two) Times a Day As Needed for High Blood Pressure (if bp more than 140/90)., Disp: 60 tablet, Rfl: 1    dexAMETHasone (DECADRON) 4 MG/ML injection, Use 1 mL at Physical Therapy for ionthophoresis., Disp: 40 mL, Rfl: 0    dexlansoprazole (Dexilant) 60 MG capsule, Take 1 capsule by mouth 2 (Two) Times a Day., Disp: 60 capsule, Rfl: 5    Diclofenac Sodium (VOLTAREN) 1 % gel gel, Apply 2 grams topically to the affect area as directed 4 (Four) Times a Day., Disp: 200 g, Rfl: 2    Dilantin 100 MG capsule, Take 2 capsules by mouth 2 (Two) Times a Day., Disp: 120 capsule, Rfl: 5    docusate calcium (SURFAK) 240 MG capsule, Take 1 capsule by mouth 2 (Two) Times a Day As Needed for Constipation., Disp: 60 capsule, Rfl: 2    Elastic Bandages & Supports (ACE Ankle Brace) misc, 1 each Daily., Disp: 1 each, Rfl: 0    EPINEPHrine (EPIPEN) 0.3 MG/0.3ML solution auto-injector injection, Inject Intramuscularly into lateral thigh as needed for treatment of anaphylaxis, then go to the ER or call 911., Disp: 2  each, Rfl: 2    ergocalciferol (ERGOCALCIFEROL) 1.25 MG (67993 UT) capsule, Take 1 capsule by mouth 1 (One) Time Per Week., Disp: 4 capsule, Rfl: 5    Evolocumab (REPATHA) solution auto-injector SureClick injection, Inject 1 mL under the skin into the appropriate area as directed Every 14 (Fourteen) Days., Disp: 2 mL, Rfl: 5    fluticasone (FLONASE) 50 MCG/ACT nasal spray, Use 1 spray in each nostril Twice a day for 30 days, Disp: 16 g, Rfl: 11    fluticasone (Flovent HFA) 110 MCG/ACT inhaler, Inhale 1 puff by mouth 2 (Two) Times a Day., Disp: 12 g, Rfl: 5    fluticasone (FLOVENT HFA) 44 MCG/ACT inhaler, Inhale 1 puff by mouth twice a day., Disp: 10.6 g, Rfl: 5    glucose blood (OneTouch Ultra) test strip, Use as instructed 2 times daily and as needed, Disp: 100 each, Rfl: 3    HYDROcodone-acetaminophen (NORCO) 5-325 MG per tablet, Take 1 tablet by mouth Every 8 (Eight) Hours for 10 days  (max daily dose of 3 tablets)., Disp: 30 tablet, Rfl: 0    ibuprofen (ADVIL,MOTRIN) 800 MG tablet, Take 1 tablet by mouth Every 6 (Six) Hours As Needed., Disp: 40 tablet, Rfl: 1    ipratropium-albuterol (Combivent Respimat)  MCG/ACT inhaler, INHALE 1 PUFF BY MOUTH 4 (FOUR) TIMES A DAY AS NEEDED FOR WHEEZING., Disp: 4 g, Rfl: 5    ipratropium-albuterol (DUO-NEB) 0.5-2.5 mg/3 ml nebulizer, Inhale the contents of 3 mL vial nebulizer every 6 hours as needed., Disp: 360 mL, Rfl: 3    Lancets (OneTouch Delica Plus [id number]) misc, Use as instructed 2 times daily and as needed, Disp: 100 each, Rfl: 3    levocetirizine (XYZAL) 5 MG tablet, Take 1 tablet by mouth in the evening Once a day 30 days, Disp: 30 tablet, Rfl: 11    lisinopril (PRINIVIL,ZESTRIL) 30 MG tablet, Take 1 tablet by mouth Daily for blood pressure., Disp: 90 tablet, Rfl: 1    loratadine (CLARITIN) 10 MG tablet, Take 1 tablet by mouth Daily As Needed for Allergies., Disp: 30 tablet, Rfl: 5    methocarbamol (ROBAXIN) 750 MG tablet, Take 1 tablet by mouth 3 (Three)  Times a Day., Disp: 90 tablet, Rfl: 0    mirtazapine (REMERON) 30 MG tablet, Take 1 & 1/2  tablets by mouth Every Night., Disp: 45 tablet, Rfl: 5    montelukast (SINGULAIR) 10 MG tablet, Take 1 tablet by mouth Every Night., Disp: 90 tablet, Rfl: 2    mupirocin (BACTROBAN) 2 % ointment, Apply 1 Application topically to the appropriate area as directed 3 (Three) Times a Day., Disp: 30 g, Rfl: 0    ondansetron (Zofran) 4 MG tablet, Take 1 tablet by mouth Every 8 (Eight) Hours As Needed for Nausea., Disp: 20 tablet, Rfl: 0    phentermine (Adipex-P) 37.5 MG tablet, Take 1 tablet by mouth Every Morning Before Breakfast., Disp: 30 tablet, Rfl: 0    rosuvastatin (Crestor) 40 MG tablet, Take 1 tablet by mouth Daily., Disp: 30 tablet, Rfl: 5    Semaglutide-Weight Management (Wegovy) 0.25 MG/0.5ML solution auto-injector, Inject 0.5 mL under the skin into the appropriate area as directed 1 (One) Time Per Week for 4 doses., Disp: 2 mL, Rfl: 0    sodium chloride 0.65 % nasal spray, Use 1-3 sprays in each nostril Three times a day as needed 30 days, Disp: 44 mL, Rfl: 11    tamsulosin (FLOMAX) 0.4 MG capsule 24 hr capsule, Take 1 capsule by mouth Daily., Disp: 90 capsule, Rfl: 3    temazepam (RESTORIL) 30 MG capsule, Take 1 capsule by mouth At Night As Needed for Sleep., Disp: 30 capsule, Rfl: 1    vitamin C (ASCORBIC ACID) 500 MG tablet, Take 1 tablet by mouth 2 (Two) Times a Day., Disp: 60 tablet, Rfl: 5    Vonoprazan Fumarate (Voquezna) 20 MG tablet, Take 1 tablet by mouth Daily., Disp: , Rfl:     Medicines reviewed by Melinda Case, PharmD on 7/23/2025 at  1:49 PM    Drug Interactions  No significant drug-drug interactions with Repatha according to literature.     Adverse Drug Reactions        Plan for ADR Management: n/a    Adherence, Self-Administration, and Current Therapy Problems  Adherence related to the patient's specialty therapy was discussed with the patient. The Adherence segment of this outreach has been  reviewed and updated.          Additional Barriers to Patient Self-Administration: patient refuses to administer himself  Methods for Supporting Patient Self-Administration: patient comes to [facility] every 2 weeks for injections    Open Medication Therapy Problems  No medication therapy recommendations to display    Goals of Therapy  Goals related to the patient's specialty therapy were discussed with the patient. The Patient Goals segment of this outreach has been reviewed and updated.   Goals Addressed Today    None       Quality of Life Assessment   Quality of Life related to the patient's enrollment in the patient management program and services provided was discussed with the patient. The QOL segment of this outreach has been reviewed and updated.       Reassessment Plan & Follow-Up  1.Medication Therapy Changes: Patient will continue Repatha 140mg every 2 weeks    A. I administered injection in to the back of his LEFT arm   2. Related Plans, Therapy Recommendations, or Issues to Be Addressed: none  3. Pharmacist to perform regular assessments no more than (6) months from the previous assessment.  Patient will continue regular follow-up with referring provider.   4. Care Coordinator to set up future refill outreaches, coordinate prescription delivery, and escalate clinical questions to pharmacist.  5. Patient continues to have a problem that the clinic staff ask his name and  during his appt.  Patient has been advised multiple times that before any injection is provided that his name and  are 2 identifying factors that will be used as well as always confirming the medication and that he has continued tolerating the medication since last injection and that this is important for his safety.  Patient is aware that these injections can be done at home, however, he declines this as an option.   6. Patient is aware that if he receives any other injections, such as allergy shots, that the area of the Repatha  needs to be avoided and other injection sites used if possible so that if a reaction to either ever occur that there will be no question what injection it was.       Attestation      I attest the patient was actively involved in and has agreed to the above plan of care.  If the prescribed therapy is at any point deemed not appropriate based on the current or future assessments, a consultation will be initiated with the patient's specialty care provider to determine the best course of action. The revised plan of therapy will be documented along with any required assessments and/or additional patient education provided.     Patient to follow up for injections in Clinic every 2 weeks    Melinda Case, PharmD  7/23/2025  13:55 EDT

## 2025-08-01 ENCOUNTER — APPOINTMENT (OUTPATIENT)
Dept: CT IMAGING | Facility: HOSPITAL | Age: 55
End: 2025-08-01
Payer: COMMERCIAL

## 2025-08-01 ENCOUNTER — HOSPITAL ENCOUNTER (EMERGENCY)
Facility: HOSPITAL | Age: 55
Discharge: HOME | End: 2025-08-01
Payer: COMMERCIAL

## 2025-08-01 VITALS
HEART RATE: 79 BPM | TEMPERATURE: 98.3 F | DIASTOLIC BLOOD PRESSURE: 80 MMHG | SYSTOLIC BLOOD PRESSURE: 130 MMHG | RESPIRATION RATE: 15 BRPM | WEIGHT: 230 LBS | BODY MASS INDEX: 36.1 KG/M2 | HEIGHT: 67 IN | OXYGEN SATURATION: 99 %

## 2025-08-01 DIAGNOSIS — R19.7 DIARRHEA, UNSPECIFIED TYPE: Primary | ICD-10-CM

## 2025-08-01 LAB
ALBUMIN SERPL-MCNC: 4.5 G/DL (ref 3.5–5.2)
ALBUMIN/GLOB SERPL: 1.4 G/DL
ALP SERPL-CCNC: 105 U/L (ref 39–117)
ALT SERPL W P-5'-P-CCNC: 27 U/L (ref 1–41)
ANION GAP SERPL CALCULATED.3IONS-SCNC: 10.8 MMOL/L (ref 5–15)
APTT PPP: 24.5 SECONDS (ref 24.5–35.9)
AST SERPL-CCNC: 29 U/L (ref 1–40)
BASOPHILS # BLD AUTO: 0.02 10*3/MM3 (ref 0–0.2)
BASOPHILS # BLD AUTO: 0.03 10*3/MM3 (ref 0–0.2)
BASOPHILS NFR BLD AUTO: 0.2 % (ref 0–1.5)
BASOPHILS NFR BLD AUTO: 0.4 % (ref 0–1.5)
BILIRUB SERPL-MCNC: 0.4 MG/DL (ref 0–1.2)
BUN SERPL-MCNC: 16.6 MG/DL (ref 6–20)
BUN/CREAT SERPL: 19.5 (ref 7–25)
CALCIUM SPEC-SCNC: 9.2 MG/DL (ref 8.6–10.5)
CHLORIDE SERPL-SCNC: 103 MMOL/L (ref 98–107)
CO2 SERPL-SCNC: 24.2 MMOL/L (ref 22–29)
CREAT SERPL-MCNC: 0.85 MG/DL (ref 0.76–1.27)
D-LACTATE SERPL-SCNC: 1.3 MMOL/L (ref 0.5–2)
DEPRECATED RDW RBC AUTO: 44.9 FL (ref 37–54)
DEPRECATED RDW RBC AUTO: 45 FL (ref 37–54)
EGFRCR SERPLBLD CKD-EPI 2021: 104.6 ML/MIN/1.73
EOSINOPHIL # BLD AUTO: 0.09 10*3/MM3 (ref 0–0.4)
EOSINOPHIL # BLD AUTO: 0.09 10*3/MM3 (ref 0–0.4)
EOSINOPHIL NFR BLD AUTO: 0.8 % (ref 0.3–6.2)
EOSINOPHIL NFR BLD AUTO: 1.2 % (ref 0.3–6.2)
ERYTHROCYTE [DISTWIDTH] IN BLOOD BY AUTOMATED COUNT: 12.4 % (ref 12.3–15.4)
ERYTHROCYTE [DISTWIDTH] IN BLOOD BY AUTOMATED COUNT: 12.6 % (ref 12.3–15.4)
GLOBULIN UR ELPH-MCNC: 3.3 GM/DL
GLUCOSE SERPL-MCNC: 103 MG/DL (ref 65–99)
HCT VFR BLD AUTO: 42.8 % (ref 37.5–51)
HCT VFR BLD AUTO: 45.1 % (ref 37.5–51)
HGB BLD-MCNC: 15.1 G/DL (ref 13–17.7)
HGB BLD-MCNC: 15.7 G/DL (ref 13–17.7)
HOLD SPECIMEN: NORMAL
HOLD SPECIMEN: NORMAL
IMM GRANULOCYTES # BLD AUTO: 0.02 10*3/MM3 (ref 0–0.05)
IMM GRANULOCYTES # BLD AUTO: 0.05 10*3/MM3 (ref 0–0.05)
IMM GRANULOCYTES NFR BLD AUTO: 0.3 % (ref 0–0.5)
IMM GRANULOCYTES NFR BLD AUTO: 0.4 % (ref 0–0.5)
INR PPP: 1.03 (ref 0.9–1.1)
LIPASE SERPL-CCNC: 32 U/L (ref 13–60)
LYMPHOCYTES # BLD AUTO: 1.39 10*3/MM3 (ref 0.7–3.1)
LYMPHOCYTES # BLD AUTO: 1.59 10*3/MM3 (ref 0.7–3.1)
LYMPHOCYTES NFR BLD AUTO: 12.2 % (ref 19.6–45.3)
LYMPHOCYTES NFR BLD AUTO: 21.7 % (ref 19.6–45.3)
MCH RBC QN AUTO: 33.3 PG (ref 26.6–33)
MCH RBC QN AUTO: 34.1 PG (ref 26.6–33)
MCHC RBC AUTO-ENTMCNC: 34.8 G/DL (ref 31.5–35.7)
MCHC RBC AUTO-ENTMCNC: 35.3 G/DL (ref 31.5–35.7)
MCV RBC AUTO: 95.8 FL (ref 79–97)
MCV RBC AUTO: 96.6 FL (ref 79–97)
MONOCYTES # BLD AUTO: 0.62 10*3/MM3 (ref 0.1–0.9)
MONOCYTES # BLD AUTO: 0.96 10*3/MM3 (ref 0.1–0.9)
MONOCYTES NFR BLD AUTO: 8.4 % (ref 5–12)
MONOCYTES NFR BLD AUTO: 8.5 % (ref 5–12)
NEUTROPHILS NFR BLD AUTO: 4.97 10*3/MM3 (ref 1.7–7)
NEUTROPHILS NFR BLD AUTO: 67.9 % (ref 42.7–76)
NEUTROPHILS NFR BLD AUTO: 78 % (ref 42.7–76)
NEUTROPHILS NFR BLD AUTO: 8.9 10*3/MM3 (ref 1.7–7)
NRBC BLD AUTO-RTO: 0 /100 WBC (ref 0–0.2)
NRBC BLD AUTO-RTO: 0 /100 WBC (ref 0–0.2)
PHENYTOIN SERPL-MCNC: 17.4 MCG/ML (ref 10–20)
PLATELET # BLD AUTO: 165 10*3/MM3 (ref 140–450)
PLATELET # BLD AUTO: 186 10*3/MM3 (ref 140–450)
PMV BLD AUTO: 9.6 FL (ref 6–12)
PMV BLD AUTO: 9.7 FL (ref 6–12)
POTASSIUM SERPL-SCNC: 4.6 MMOL/L (ref 3.5–5.2)
PROT SERPL-MCNC: 7.8 G/DL (ref 6–8.5)
PROTHROMBIN TIME: 14.1 SECONDS (ref 12.5–15.2)
RBC # BLD AUTO: 4.43 10*6/MM3 (ref 4.14–5.8)
RBC # BLD AUTO: 4.71 10*6/MM3 (ref 4.14–5.8)
SODIUM SERPL-SCNC: 138 MMOL/L (ref 136–145)
WBC NRBC COR # BLD AUTO: 11.41 10*3/MM3 (ref 3.4–10.8)
WBC NRBC COR # BLD AUTO: 7.32 10*3/MM3 (ref 3.4–10.8)
WHOLE BLOOD HOLD COAG: NORMAL
WHOLE BLOOD HOLD SPECIMEN: NORMAL

## 2025-08-01 PROCEDURE — 25810000003 SODIUM CHLORIDE 0.9 % SOLUTION: Performed by: NURSE PRACTITIONER

## 2025-08-01 PROCEDURE — 96360 HYDRATION IV INFUSION INIT: CPT

## 2025-08-01 PROCEDURE — 83690 ASSAY OF LIPASE: CPT

## 2025-08-01 PROCEDURE — 74176 CT ABD & PELVIS W/O CONTRAST: CPT | Performed by: RADIOLOGY

## 2025-08-01 PROCEDURE — 85610 PROTHROMBIN TIME: CPT | Performed by: NURSE PRACTITIONER

## 2025-08-01 PROCEDURE — 83605 ASSAY OF LACTIC ACID: CPT

## 2025-08-01 PROCEDURE — 85730 THROMBOPLASTIN TIME PARTIAL: CPT | Performed by: NURSE PRACTITIONER

## 2025-08-01 PROCEDURE — 74176 CT ABD & PELVIS W/O CONTRAST: CPT

## 2025-08-01 PROCEDURE — 99284 EMERGENCY DEPT VISIT MOD MDM: CPT

## 2025-08-01 PROCEDURE — 80053 COMPREHEN METABOLIC PANEL: CPT

## 2025-08-01 PROCEDURE — 80185 ASSAY OF PHENYTOIN TOTAL: CPT | Performed by: NURSE PRACTITIONER

## 2025-08-01 PROCEDURE — 85025 COMPLETE CBC W/AUTO DIFF WBC: CPT

## 2025-08-01 PROCEDURE — 36415 COLL VENOUS BLD VENIPUNCTURE: CPT

## 2025-08-01 PROCEDURE — 85025 COMPLETE CBC W/AUTO DIFF WBC: CPT | Performed by: NURSE PRACTITIONER

## 2025-08-01 RX ORDER — SODIUM CHLORIDE 0.9 % (FLUSH) 0.9 %
10 SYRINGE (ML) INJECTION AS NEEDED
Status: DISCONTINUED | OUTPATIENT
Start: 2025-08-01 | End: 2025-08-01 | Stop reason: HOSPADM

## 2025-08-01 RX ADMIN — SODIUM CHLORIDE 1000 ML: 9 INJECTION, SOLUTION INTRAVENOUS at 09:32

## 2025-08-01 NOTE — ED PROVIDER NOTES
"Subjective   History of Present Illness  Denies fever.Patient is a 54-year-old male who presents today for abdominal pain and diarrhea.  Patient reports he has had over a dozen bowel movements in the last 12 hours.  Patient reports last 2-3 bowel movements of possibly had blood.  Patient does report nausea.  Patient reports some abdominal pain.  Patient denies hematemesis.          Review of Systems   Constitutional: Negative.    HENT: Negative.     Eyes: Negative.    Respiratory: Negative.     Cardiovascular: Negative.    Gastrointestinal:  Positive for abdominal pain.   Endocrine: Negative.    Genitourinary: Negative.    Musculoskeletal: Negative.    Skin: Negative.    Allergic/Immunologic: Negative.    Neurological: Negative.    Hematological: Negative.    Psychiatric/Behavioral: Negative.         Past Medical History:   Diagnosis Date    Allergic     Anxiety     Arthritis     Asthma     Body piercing     REPORTS CYLICONE IN EARS    Clotting disorder 2004    had a knee surgery    Coronary artery disease     Depression     DVT (deep venous thrombosis)     RIGHT RIGHT KNEE AFTER SURGERY YEARS AGO IN 2001 OR 2004    Elevated cholesterol     Gastric ulcer     GERD (gastroesophageal reflux disease)     H/O migraine     Headache     Heart attack     REPORTS \"LIGHT HEART ATTACK A LONG TIME AGO\"  \"EARLY 90'S\"    History of seizures     REPORTS LAST EPISODE WAS AROUND 1995.    Hostility     Hyperlipidemia     Hypertension     Knee pain, acute     Left    Low back pain     Lyme disease     Migraine     MRSA (methicillin resistant Staphylococcus aureus)     REPORTS LAST TESTED + 2004. WAS TREATED HE REPORTS.  RIGHT ARM, RIGHT KNEE.    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     Obesity     Poor historian     [place] spotted fever     Seizures     Sleep apnea     Tattoo     Wears glasses        Allergies   Allergen Reactions    Ciprofloxacin Anaphylaxis and Hives    Metronidazole Rash    Miralax [Polyethylene Glycol] Itching and Rash    " "Mobic [Meloxicam] Other (See Comments)     Pt states, \"It make my feet and hands go numb and I can't hardly walk.\"     Paxil [Paroxetine Hcl] Shortness Of Breath     Chest pain     Peanut-Containing Drug Products Anaphylaxis    Penicillins Anaphylaxis    Pristiq [Desvenlafaxine Succinate Er] Dizziness    Sulfa Antibiotics Anaphylaxis, Itching and Rash    Doxycycline Hives    Fish-Derived Products Hives    Isosorbide Nitrate Rash     Rash, hives, had to use inhaler.     Lyrica [Pregabalin] Hives    Movantik [Naloxegol] Rash    Seroquel [Quetiapine] Hives and Rash    Trulance [Plecanatide] Hives    Buspar [Buspirone] Rash    Clarithromycin Rash    Clindamycin/Lincomycin Rash    Codeine Rash    Contrast Dye (Echo Or Unknown Ct/Mr) Itching and Rash    Diltiazem Rash    Elavil [Amitriptyline] Rash    Flomax [Tamsulosin] Hives    Gabapentin Rash    Iodinated Contrast Media Itching and Rash    Keflex [Cephalexin] Rash    Levocetirizine Rash    Linzess [Linaclotide] Rash    Metoprolol Rash    Prednisone Rash and Other (See Comments)     Face, feet, and legs go completely numb per patient    Robitussin Cough+ Chest Max St [Dextromethorphan-Guaifenesin] Itching    Shrimp (Diagnostic) Rash    Spironolactone Rash    Viibryd [Vilazodone Hcl] Itching and Rash    Zoloft [Sertraline Hcl] Hives and Itching       Past Surgical History:   Procedure Laterality Date    ABDOMINAL SURGERY      BACK SURGERY      BRAIN SURGERY  1986    Tumor removal     CARDIAC CATHETERIZATION N/A 9/28/2018    Procedure: Left Heart Cath;  Surgeon: Leandro Daily MD;  Location: [facility] CATH INVASIVE LOCATION;  Service: Cardiology    CHOLECYSTECTOMY      COLONOSCOPY      COLONOSCOPY N/A 8/2/2021    Procedure: COLONOSCOPY FOR SCREENING;  Surgeon: Irving Azar MD;  Location: [facility] OR;  Service: Gastroenterology;  Laterality: N/A;    CYST REMOVAL      pilonidal cyst    ELBOW EPICONDYLECTOMY Right 7/23/2020    Procedure: LATERAL EPICONDYLAR " RELEASE;  Surgeon: Jose Ruiz MD;  Location: [facility] OR;  Service: Orthopedics;  Laterality: Right;    ENDOSCOPY      ENDOSCOPY N/A 8/2/2021    Procedure: ESOPHAGOGASTRODUODENOSCOPY WITH BIOPSY;  Surgeon: Irving Azar MD;  Location: [facility] OR;  Service: Gastroenterology;  Laterality: N/A;  esophageal dilatation to 20mm    FRACTURE SURGERY Right     elbow    KNEE ARTHROSCOPY Left 10/20/2017    Procedure: Diagnostic arthroscopy left knee with chondroplasty;  Surgeon: Marco Aguirre MD;  Location: [facility] OR;  Service:     KNEE ARTHROSCOPY Left 1/11/2021    Procedure: KNEE DIAGNOSTIC ARTHROSCOPY WITH  CHONDROPLASTY patella, femoral and medial;  Surgeon: Raul Eagle MD;  Location: [facility] OR;  Service: Orthopedics;  Laterality: Left;    KNEE SURGERY Right     MOUTH SURGERY      FULL MOUTH EXTRACTION    OTHER SURGICAL HISTORY      REPORTS 7 TICKS REMOVED FROM RIGHT ARM IN 2001 OR 2002    TENNIS ELBOW RELEASE Right 7/23/2020    Procedure: RIGHT TENNIS ELBOW RELEASE;  Surgeon: Jose Ruiz MD;  Location: [facility] OR;  Service: Orthopedics;  Laterality: Right;    TUMOR EXCISION      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       Family History   Problem Relation Age of Onset    Diabetes Mother     Hypertension Mother     Stroke Mother     Diabetes Father     Skin cancer Father     Hypertension Father     Heart attack Father     Diabetes Brother     Hypertension Brother     Heart disease Maternal Aunt     Heart disease Maternal Uncle     Heart disease Paternal Aunt     Heart disease Paternal Uncle     Heart disease Maternal Grandmother     Heart disease Maternal Grandfather     Heart disease Paternal Grandmother     Heart disease Paternal Grandfather        Social History     Socioeconomic History    Marital status:      Spouse name: Becca    Number of children: 2    Years of education: 12   Tobacco Use    Smoking status: Every Day     Current packs/day: 1.00      Average packs/day: 1 pack/day for 18.4 years (18.4 ttl pk-yrs)     Types: Cigars, Cigarettes     Start date: 4/11/2022     Passive exposure: Current    Smokeless tobacco: Never   Vaping Use    Vaping status: Never Used   Substance and Sexual Activity    Alcohol use: No    Drug use: No    Sexual activity: Defer     Partners: Female     Birth control/protection: None           Objective   Physical Exam  Vitals and nursing note reviewed.   Constitutional:       Appearance: He is well-developed.   HENT:      Head: Normocephalic.      Right Ear: External ear normal.      Left Ear: External ear normal.   Eyes:      Conjunctiva/sclera: Conjunctivae normal.      Pupils: Pupils are equal, round, and reactive to light.   Cardiovascular:      Rate and Rhythm: Normal rate and regular rhythm.      Heart sounds: Normal heart sounds.   Pulmonary:      Effort: Pulmonary effort is normal.      Breath sounds: Normal breath sounds.   Abdominal:      General: Bowel sounds are normal.      Palpations: Abdomen is soft.   Musculoskeletal:         General: Normal range of motion.      Cervical back: Normal range of motion and neck supple.   Skin:     General: Skin is warm and dry.      Capillary Refill: Capillary refill takes less than 2 seconds.   Neurological:      Mental Status: He is alert and oriented to person, place, and time.   Psychiatric:         Behavior: Behavior normal.         Thought Content: Thought content normal.         Procedures  Results for orders placed or performed during the hospital encounter of 08/01/25   Comprehensive Metabolic Panel    Collection Time: 08/01/25  8:42 AM    Specimen: Arm, Left; Blood   Result Value Ref Range    Glucose 103 (H) 65 - 99 mg/dL    BUN 16.6 6.0 - 20.0 mg/dL    Creatinine 0.85 0.76 - 1.27 mg/dL    Sodium 138 136 - 145 mmol/L    Potassium 4.6 3.5 - 5.2 mmol/L    Chloride 103 98 - 107 mmol/L    CO2 24.2 22.0 - 29.0 mmol/L    Calcium 9.2 8.6 - 10.5 mg/dL    Total Protein 7.8 6.0 - 8.5  g/dL    Albumin 4.5 3.5 - 5.2 g/dL    ALT (SGPT) 27 1 - 41 U/L    AST (SGOT) 29 1 - 40 U/L    Alkaline Phosphatase 105 39 - 117 U/L    Total Bilirubin 0.4 0.0 - 1.2 mg/dL    Globulin 3.3 gm/dL    A/G Ratio 1.4 g/dL    BUN/Creatinine Ratio 19.5 7.0 - 25.0    Anion Gap 10.8 5.0 - 15.0 mmol/L    eGFR 104.6 >60.0 mL/min/1.73   Lipase    Collection Time: 08/01/25  8:42 AM    Specimen: Arm, Left; Blood   Result Value Ref Range    Lipase 32 13 - 60 U/L   Lactic Acid, Plasma    Collection Time: 08/01/25  8:42 AM    Specimen: Arm, Left; Blood   Result Value Ref Range    Lactate 1.3 0.5 - 2.0 mmol/L   CBC Auto Differential    Collection Time: 08/01/25  8:42 AM    Specimen: Arm, Left; Blood   Result Value Ref Range    WBC 11.41 (H) 3.40 - 10.80 10*3/mm3    RBC 4.71 4.14 - 5.80 10*6/mm3    Hemoglobin 15.7 13.0 - 17.7 g/dL    Hematocrit 45.1 37.5 - 51.0 %    MCV 95.8 79.0 - 97.0 fL    MCH 33.3 (H) 26.6 - 33.0 pg    MCHC 34.8 31.5 - 35.7 g/dL    RDW 12.6 12.3 - 15.4 %    RDW-SD 44.9 37.0 - 54.0 fl    MPV 9.7 6.0 - 12.0 fL    Platelets 186 140 - 450 10*3/mm3    Neutrophil % 78.0 (H) 42.7 - 76.0 %    Lymphocyte % 12.2 (L) 19.6 - 45.3 %    Monocyte % 8.4 5.0 - 12.0 %    Eosinophil % 0.8 0.3 - 6.2 %    Basophil % 0.2 0.0 - 1.5 %    Immature Grans % 0.4 0.0 - 0.5 %    Neutrophils, Absolute 8.90 (H) 1.70 - 7.00 10*3/mm3    Lymphocytes, Absolute 1.39 0.70 - 3.10 10*3/mm3    Monocytes, Absolute 0.96 (H) 0.10 - 0.90 10*3/mm3    Eosinophils, Absolute 0.09 0.00 - 0.40 10*3/mm3    Basophils, Absolute 0.02 0.00 - 0.20 10*3/mm3    Immature Grans, Absolute 0.05 0.00 - 0.05 10*3/mm3    nRBC 0.0 0.0 - 0.2 /100 WBC   Protime-INR    Collection Time: 08/01/25  8:42 AM    Specimen: Arm, Left; Blood   Result Value Ref Range    Protime 14.1 12.5 - 15.2 Seconds    INR 1.03 0.90 - 1.10   aPTT    Collection Time: 08/01/25  8:42 AM    Specimen: Arm, Left; Blood   Result Value Ref Range    PTT 24.5 24.5 - 35.9 seconds   Phenytoin Level, Total    Collection  Time: 08/01/25  8:42 AM    Specimen: Arm, Left; Blood   Result Value Ref Range    Phenytoin Level 17.4 10.0 - 20.0 mcg/mL   Green Top (Gel)    Collection Time: 08/01/25  8:42 AM   Result Value Ref Range    Extra Tube Hold for add-ons.    Lavender Top    Collection Time: 08/01/25  8:42 AM   Result Value Ref Range    Extra Tube hold for add-on    Gold Top - SST    Collection Time: 08/01/25  8:42 AM   Result Value Ref Range    Extra Tube Hold for add-ons.    Light Blue Top    Collection Time: 08/01/25  8:42 AM   Result Value Ref Range    Extra Tube Hold for add-ons.    CBC Auto Differential    Collection Time: 08/01/25 12:55 PM    Specimen: Arm, Left; Blood   Result Value Ref Range    WBC 7.32 3.40 - 10.80 10*3/mm3    RBC 4.43 4.14 - 5.80 10*6/mm3    Hemoglobin 15.1 13.0 - 17.7 g/dL    Hematocrit 42.8 37.5 - 51.0 %    MCV 96.6 79.0 - 97.0 fL    MCH 34.1 (H) 26.6 - 33.0 pg    MCHC 35.3 31.5 - 35.7 g/dL    RDW 12.4 12.3 - 15.4 %    RDW-SD 45.0 37.0 - 54.0 fl    MPV 9.6 6.0 - 12.0 fL    Platelets 165 140 - 450 10*3/mm3    Neutrophil % 67.9 42.7 - 76.0 %    Lymphocyte % 21.7 19.6 - 45.3 %    Monocyte % 8.5 5.0 - 12.0 %    Eosinophil % 1.2 0.3 - 6.2 %    Basophil % 0.4 0.0 - 1.5 %    Immature Grans % 0.3 0.0 - 0.5 %    Neutrophils, Absolute 4.97 1.70 - 7.00 10*3/mm3    Lymphocytes, Absolute 1.59 0.70 - 3.10 10*3/mm3    Monocytes, Absolute 0.62 0.10 - 0.90 10*3/mm3    Eosinophils, Absolute 0.09 0.00 - 0.40 10*3/mm3    Basophils, Absolute 0.03 0.00 - 0.20 10*3/mm3    Immature Grans, Absolute 0.02 0.00 - 0.05 10*3/mm3    nRBC 0.0 0.0 - 0.2 /100 WBC     *Note: Due to a large number of results and/or encounters for the requested time period, some results have not been displayed. A complete set of results can be found in Results Review.     CT Abdomen Pelvis Without Contrast   Final Result     No acute findings in the abdomen or pelvis.       This report was finalized on 8/1/2025 10:11 AM by Dr. Flaquito Matthews MD.                      ED Course                                                     Results for orders placed or performed during the hospital encounter of 08/01/25   Comprehensive Metabolic Panel    Collection Time: 08/01/25  8:42 AM    Specimen: Arm, Left; Blood   Result Value Ref Range    Glucose 103 (H) 65 - 99 mg/dL    BUN 16.6 6.0 - 20.0 mg/dL    Creatinine 0.85 0.76 - 1.27 mg/dL    Sodium 138 136 - 145 mmol/L    Potassium 4.6 3.5 - 5.2 mmol/L    Chloride 103 98 - 107 mmol/L    CO2 24.2 22.0 - 29.0 mmol/L    Calcium 9.2 8.6 - 10.5 mg/dL    Total Protein 7.8 6.0 - 8.5 g/dL    Albumin 4.5 3.5 - 5.2 g/dL    ALT (SGPT) 27 1 - 41 U/L    AST (SGOT) 29 1 - 40 U/L    Alkaline Phosphatase 105 39 - 117 U/L    Total Bilirubin 0.4 0.0 - 1.2 mg/dL    Globulin 3.3 gm/dL    A/G Ratio 1.4 g/dL    BUN/Creatinine Ratio 19.5 7.0 - 25.0    Anion Gap 10.8 5.0 - 15.0 mmol/L    eGFR 104.6 >60.0 mL/min/1.73   Lipase    Collection Time: 08/01/25  8:42 AM    Specimen: Arm, Left; Blood   Result Value Ref Range    Lipase 32 13 - 60 U/L   Lactic Acid, Plasma    Collection Time: 08/01/25  8:42 AM    Specimen: Arm, Left; Blood   Result Value Ref Range    Lactate 1.3 0.5 - 2.0 mmol/L   CBC Auto Differential    Collection Time: 08/01/25  8:42 AM    Specimen: Arm, Left; Blood   Result Value Ref Range    WBC 11.41 (H) 3.40 - 10.80 10*3/mm3    RBC 4.71 4.14 - 5.80 10*6/mm3    Hemoglobin 15.7 13.0 - 17.7 g/dL    Hematocrit 45.1 37.5 - 51.0 %    MCV 95.8 79.0 - 97.0 fL    MCH 33.3 (H) 26.6 - 33.0 pg    MCHC 34.8 31.5 - 35.7 g/dL    RDW 12.6 12.3 - 15.4 %    RDW-SD 44.9 37.0 - 54.0 fl    MPV 9.7 6.0 - 12.0 fL    Platelets 186 140 - 450 10*3/mm3    Neutrophil % 78.0 (H) 42.7 - 76.0 %    Lymphocyte % 12.2 (L) 19.6 - 45.3 %    Monocyte % 8.4 5.0 - 12.0 %    Eosinophil % 0.8 0.3 - 6.2 %    Basophil % 0.2 0.0 - 1.5 %    Immature Grans % 0.4 0.0 - 0.5 %    Neutrophils, Absolute 8.90 (H) 1.70 - 7.00 10*3/mm3    Lymphocytes, Absolute 1.39 0.70 - 3.10 10*3/mm3     Monocytes, Absolute 0.96 (H) 0.10 - 0.90 10*3/mm3    Eosinophils, Absolute 0.09 0.00 - 0.40 10*3/mm3    Basophils, Absolute 0.02 0.00 - 0.20 10*3/mm3    Immature Grans, Absolute 0.05 0.00 - 0.05 10*3/mm3    nRBC 0.0 0.0 - 0.2 /100 WBC   Protime-INR    Collection Time: 08/01/25  8:42 AM    Specimen: Arm, Left; Blood   Result Value Ref Range    Protime 14.1 12.5 - 15.2 Seconds    INR 1.03 0.90 - 1.10   aPTT    Collection Time: 08/01/25  8:42 AM    Specimen: Arm, Left; Blood   Result Value Ref Range    PTT 24.5 24.5 - 35.9 seconds   Phenytoin Level, Total    Collection Time: 08/01/25  8:42 AM    Specimen: Arm, Left; Blood   Result Value Ref Range    Phenytoin Level 17.4 10.0 - 20.0 mcg/mL   Green Top (Gel)    Collection Time: 08/01/25  8:42 AM   Result Value Ref Range    Extra Tube Hold for add-ons.    Lavender Top    Collection Time: 08/01/25  8:42 AM   Result Value Ref Range    Extra Tube hold for add-on    Gold Top - SST    Collection Time: 08/01/25  8:42 AM   Result Value Ref Range    Extra Tube Hold for add-ons.    Light Blue Top    Collection Time: 08/01/25  8:42 AM   Result Value Ref Range    Extra Tube Hold for add-ons.    CBC Auto Differential    Collection Time: 08/01/25 12:55 PM    Specimen: Arm, Left; Blood   Result Value Ref Range    WBC 7.32 3.40 - 10.80 10*3/mm3    RBC 4.43 4.14 - 5.80 10*6/mm3    Hemoglobin 15.1 13.0 - 17.7 g/dL    Hematocrit 42.8 37.5 - 51.0 %    MCV 96.6 79.0 - 97.0 fL    MCH 34.1 (H) 26.6 - 33.0 pg    MCHC 35.3 31.5 - 35.7 g/dL    RDW 12.4 12.3 - 15.4 %    RDW-SD 45.0 37.0 - 54.0 fl    MPV 9.6 6.0 - 12.0 fL    Platelets 165 140 - 450 10*3/mm3    Neutrophil % 67.9 42.7 - 76.0 %    Lymphocyte % 21.7 19.6 - 45.3 %    Monocyte % 8.5 5.0 - 12.0 %    Eosinophil % 1.2 0.3 - 6.2 %    Basophil % 0.4 0.0 - 1.5 %    Immature Grans % 0.3 0.0 - 0.5 %    Neutrophils, Absolute 4.97 1.70 - 7.00 10*3/mm3    Lymphocytes, Absolute 1.59 0.70 - 3.10 10*3/mm3    Monocytes, Absolute 0.62 0.10 - 0.90  10*3/mm3    Eosinophils, Absolute 0.09 0.00 - 0.40 10*3/mm3    Basophils, Absolute 0.03 0.00 - 0.20 10*3/mm3    Immature Grans, Absolute 0.02 0.00 - 0.05 10*3/mm3    nRBC 0.0 0.0 - 0.2 /100 WBC     *Note: Due to a large number of results and/or encounters for the requested time period, some results have not been displayed. A complete set of results can be found in Results Review.     CT Abdomen Pelvis Without Contrast   Final Result     No acute findings in the abdomen or pelvis.       This report was finalized on 8/1/2025 10:11 AM by Dr. Flaquito Matthews MD.              Medical Decision Making  MDM:    Escalation of care including admission/observation considered    - Discussions of management with other providers:  None    - Discussed/reviewed with Radiology regarding test interpretation    - Independent interpretation: None    - Additional patient history obtained from: None    - Review of external non-ED record (if available):  Prior Inpt record, Office record, Outpt record, Prior Outpt labs, PCP record, Outside ED record, Other    - Chronic conditions affecting care: See HPI and medical Hx.    - Social Determinants of health significantly affecting care:  None        Medical Decision Making Discussion:    Denies fever.Patient is a 54-year-old male who presents today for abdominal pain and diarrhea.  Patient reports he has had over a dozen bowel movements in the last 12 hours.  Patient reports last 2-3 bowel movements of possibly had blood.  Patient does report nausea.  Patient reports some abdominal pain.  Patient denies hematemesis.      The patient has been given very strict return precautions to return to the emergency department should there be any acute change or worsening of their condition.  I have explained my findings and the patient has expressed understanding to me.  I explained that the work-up performed in the ED has been based on the specific complaint and concern, as the nature of emergency  medicine is complaint driven and they understand that new symptoms may arise.  I have told them that, should there be any new symptoms, worsening or changing symptoms, a new work-up may be indicated that they are encouraged to return to the emergency department or promptly contact their primary care physician. We have employed a shared decision-making process as the discussion of their disposition.  The patient has been educated as to the nature of the visit, the tests and work-up performed and the findings from today's visit. At this time, there does not appear to be any acute emergent process that necessitates admission to the hospital, however, the patient understands that this can change unexpectedly. At this time, the patient is stable for discharge home and agrees to follow-up with her primary care physician in the next 24 to 48 hours or earlier should they be able to obtain an appointment.    The patient was counseled regarding diagnostic results and treatment plan and patient has indicated understanding of these instructions.      Problems Addressed:  Diarrhea, unspecified type: complicated acute illness or injury    Amount and/or Complexity of Data Reviewed  Labs: ordered. Decision-making details documented in ED Course.  Radiology: ordered. Decision-making details documented in ED Course.    Risk  Prescription drug management.        Final diagnoses:   Diarrhea, unspecified type       ED Disposition  ED Disposition       ED Disposition   Discharge    Condition   Stable    Comment   --               Tiera Valenzuela, APRN  [address] [id number] [phone]    Schedule an appointment as soon as possible for a visit   For further evaluation         Medication List      No changes were made to your prescriptions during this visit.            Philipp Mehta, APRN  08/01/25 [id number]

## 2025-08-04 ENCOUNTER — APPOINTMENT (OUTPATIENT)
Dept: ULTRASOUND IMAGING | Facility: HOSPITAL | Age: 55
End: 2025-08-04
Payer: COMMERCIAL

## 2025-08-04 ENCOUNTER — HOSPITAL ENCOUNTER (EMERGENCY)
Facility: HOSPITAL | Age: 55
Discharge: HOME | End: 2025-08-04
Attending: EMERGENCY MEDICINE | Admitting: EMERGENCY MEDICINE
Payer: COMMERCIAL

## 2025-08-04 ENCOUNTER — APPOINTMENT (OUTPATIENT)
Dept: CT IMAGING | Facility: HOSPITAL | Age: 55
End: 2025-08-04
Payer: COMMERCIAL

## 2025-08-04 VITALS
TEMPERATURE: 98 F | SYSTOLIC BLOOD PRESSURE: 122 MMHG | OXYGEN SATURATION: 98 % | RESPIRATION RATE: 16 BRPM | HEART RATE: 88 BPM | DIASTOLIC BLOOD PRESSURE: 84 MMHG | WEIGHT: 235 LBS | BODY MASS INDEX: 36.88 KG/M2 | HEIGHT: 67 IN

## 2025-08-04 DIAGNOSIS — R10.84 GENERALIZED ABDOMINAL PAIN: Primary | ICD-10-CM

## 2025-08-04 LAB
ADV 40+41 DNA STL QL NAA+NON-PROBE: NOT DETECTED
ALBUMIN SERPL-MCNC: 4.8 G/DL (ref 3.5–5.2)
ALBUMIN/GLOB SERPL: 1.3 G/DL
ALP SERPL-CCNC: 109 U/L (ref 39–117)
ALT SERPL W P-5'-P-CCNC: 29 U/L (ref 1–41)
ANION GAP SERPL CALCULATED.3IONS-SCNC: 14.8 MMOL/L (ref 5–15)
AST SERPL-CCNC: 30 U/L (ref 1–40)
ASTRO TYP 1-8 RNA STL QL NAA+NON-PROBE: NOT DETECTED
BASOPHILS # BLD AUTO: 0.03 10*3/MM3 (ref 0–0.2)
BASOPHILS NFR BLD AUTO: 0.2 % (ref 0–1.5)
BILIRUB SERPL-MCNC: 0.3 MG/DL (ref 0–1.2)
BUN SERPL-MCNC: 14.4 MG/DL (ref 6–20)
BUN/CREAT SERPL: 18.7 (ref 7–25)
C CAYETANENSIS DNA STL QL NAA+NON-PROBE: NOT DETECTED
C COLI+JEJ+UPSA DNA STL QL NAA+NON-PROBE: NOT DETECTED
CALCIUM SPEC-SCNC: 9.4 MG/DL (ref 8.6–10.5)
CHLORIDE SERPL-SCNC: 104 MMOL/L (ref 98–107)
CO2 SERPL-SCNC: 18.2 MMOL/L (ref 22–29)
CREAT SERPL-MCNC: 0.77 MG/DL (ref 0.76–1.27)
CRP SERPL-MCNC: 0.8 MG/DL (ref 0–0.5)
CRYPTOSP DNA STL QL NAA+NON-PROBE: NOT DETECTED
D-LACTATE SERPL-SCNC: 1 MMOL/L (ref 0.5–2)
DEPRECATED RDW RBC AUTO: 42.5 FL (ref 37–54)
E HISTOLYT DNA STL QL NAA+NON-PROBE: NOT DETECTED
EAEC PAA PLAS AGGR+AATA ST NAA+NON-PRB: NOT DETECTED
EC STX1+STX2 GENES STL QL NAA+NON-PROBE: NOT DETECTED
EGFRCR SERPLBLD CKD-EPI 2021: 107.7 ML/MIN/1.73
EOSINOPHIL # BLD AUTO: 0.16 10*3/MM3 (ref 0–0.4)
EOSINOPHIL NFR BLD AUTO: 1.2 % (ref 0.3–6.2)
EPEC EAE GENE STL QL NAA+NON-PROBE: NOT DETECTED
ERYTHROCYTE [DISTWIDTH] IN BLOOD BY AUTOMATED COUNT: 12.4 % (ref 12.3–15.4)
ETEC LTA+ST1A+ST1B TOX ST NAA+NON-PROBE: NOT DETECTED
FLUAV RNA RESP QL NAA+PROBE: NOT DETECTED
FLUBV RNA RESP QL NAA+PROBE: NOT DETECTED
G LAMBLIA DNA STL QL NAA+NON-PROBE: NOT DETECTED
GLOBULIN UR ELPH-MCNC: 3.7 GM/DL
GLUCOSE SERPL-MCNC: 95 MG/DL (ref 65–99)
HCT VFR BLD AUTO: 46.5 % (ref 37.5–51)
HGB BLD-MCNC: 16.7 G/DL (ref 13–17.7)
HOLD SPECIMEN: NORMAL
HOLD SPECIMEN: NORMAL
IMM GRANULOCYTES # BLD AUTO: 0.04 10*3/MM3 (ref 0–0.05)
IMM GRANULOCYTES NFR BLD AUTO: 0.3 % (ref 0–0.5)
LIPASE SERPL-CCNC: 36 U/L (ref 13–60)
LYMPHOCYTES # BLD AUTO: 1.93 10*3/MM3 (ref 0.7–3.1)
LYMPHOCYTES NFR BLD AUTO: 14.1 % (ref 19.6–45.3)
MCH RBC QN AUTO: 33.5 PG (ref 26.6–33)
MCHC RBC AUTO-ENTMCNC: 35.9 G/DL (ref 31.5–35.7)
MCV RBC AUTO: 93.4 FL (ref 79–97)
MONOCYTES # BLD AUTO: 1.28 10*3/MM3 (ref 0.1–0.9)
MONOCYTES NFR BLD AUTO: 9.4 % (ref 5–12)
NEUTROPHILS NFR BLD AUTO: 10.2 10*3/MM3 (ref 1.7–7)
NEUTROPHILS NFR BLD AUTO: 74.8 % (ref 42.7–76)
NOROVIRUS GI+II RNA STL QL NAA+NON-PROBE: NOT DETECTED
NRBC BLD AUTO-RTO: 0 /100 WBC (ref 0–0.2)
P SHIGELLOIDES DNA STL QL NAA+NON-PROBE: NOT DETECTED
PLATELET # BLD AUTO: 207 10*3/MM3 (ref 140–450)
PMV BLD AUTO: 10.2 FL (ref 6–12)
POTASSIUM SERPL-SCNC: 3.9 MMOL/L (ref 3.5–5.2)
PROT SERPL-MCNC: 8.5 G/DL (ref 6–8.5)
RBC # BLD AUTO: 4.98 10*6/MM3 (ref 4.14–5.8)
RVA RNA STL QL NAA+NON-PROBE: NOT DETECTED
S ENT+BONG DNA STL QL NAA+NON-PROBE: NOT DETECTED
SAPO I+II+IV+V RNA STL QL NAA+NON-PROBE: NOT DETECTED
SARS-COV-2 RNA RESP QL NAA+PROBE: NOT DETECTED
SHIGELLA SP+EIEC IPAH ST NAA+NON-PROBE: NOT DETECTED
SODIUM SERPL-SCNC: 137 MMOL/L (ref 136–145)
V CHOL+PARA+VUL DNA STL QL NAA+NON-PROBE: NOT DETECTED
V CHOLERAE DNA STL QL NAA+NON-PROBE: NOT DETECTED
WBC NRBC COR # BLD AUTO: 13.64 10*3/MM3 (ref 3.4–10.8)
WHOLE BLOOD HOLD COAG: NORMAL
WHOLE BLOOD HOLD SPECIMEN: NORMAL
Y ENTEROCOL DNA STL QL NAA+NON-PROBE: NOT DETECTED

## 2025-08-04 PROCEDURE — 76700 US EXAM ABDOM COMPLETE: CPT | Performed by: RADIOLOGY

## 2025-08-04 PROCEDURE — 87507 IADNA-DNA/RNA PROBE TQ 12-25: CPT | Performed by: PHYSICIAN ASSISTANT

## 2025-08-04 PROCEDURE — 74177 CT ABD & PELVIS W/CONTRAST: CPT

## 2025-08-04 PROCEDURE — 83605 ASSAY OF LACTIC ACID: CPT | Performed by: NURSE PRACTITIONER

## 2025-08-04 PROCEDURE — 80053 COMPREHEN METABOLIC PANEL: CPT | Performed by: NURSE PRACTITIONER

## 2025-08-04 PROCEDURE — 76700 US EXAM ABDOM COMPLETE: CPT

## 2025-08-04 PROCEDURE — 25010000002 ONDANSETRON PER 1 MG: Performed by: PHYSICIAN ASSISTANT

## 2025-08-04 PROCEDURE — 74177 CT ABD & PELVIS W/CONTRAST: CPT | Performed by: RADIOLOGY

## 2025-08-04 PROCEDURE — 25510000001 IOPAMIDOL 61 % SOLUTION: Performed by: EMERGENCY MEDICINE

## 2025-08-04 PROCEDURE — 96375 TX/PRO/DX INJ NEW DRUG ADDON: CPT

## 2025-08-04 PROCEDURE — 25010000002 METHYLPREDNISOLONE PER 40 MG: Performed by: PHYSICIAN ASSISTANT

## 2025-08-04 PROCEDURE — 83690 ASSAY OF LIPASE: CPT | Performed by: NURSE PRACTITIONER

## 2025-08-04 PROCEDURE — 36415 COLL VENOUS BLD VENIPUNCTURE: CPT | Performed by: NURSE PRACTITIONER

## 2025-08-04 PROCEDURE — 25010000002 MORPHINE PER 10 MG

## 2025-08-04 PROCEDURE — 25010000002 KETOROLAC TROMETHAMINE PER 15 MG: Performed by: NURSE PRACTITIONER

## 2025-08-04 PROCEDURE — 96374 THER/PROPH/DIAG INJ IV PUSH: CPT

## 2025-08-04 PROCEDURE — 25010000002 DIPHENHYDRAMINE PER 50 MG: Performed by: PHYSICIAN ASSISTANT

## 2025-08-04 PROCEDURE — 85025 COMPLETE CBC W/AUTO DIFF WBC: CPT | Performed by: NURSE PRACTITIONER

## 2025-08-04 PROCEDURE — 86140 C-REACTIVE PROTEIN: CPT | Performed by: PHYSICIAN ASSISTANT

## 2025-08-04 PROCEDURE — 25810000003 SODIUM CHLORIDE 0.9 % SOLUTION: Performed by: PHYSICIAN ASSISTANT

## 2025-08-04 PROCEDURE — 87636 SARSCOV2 & INF A&B AMP PRB: CPT | Performed by: PHYSICIAN ASSISTANT

## 2025-08-04 PROCEDURE — 99285 EMERGENCY DEPT VISIT HI MDM: CPT

## 2025-08-04 RX ORDER — SODIUM CHLORIDE 0.9 % (FLUSH) 0.9 %
10 SYRINGE (ML) INJECTION AS NEEDED
Status: DISCONTINUED | OUTPATIENT
Start: 2025-08-04 | End: 2025-08-05 | Stop reason: HOSPADM

## 2025-08-04 RX ORDER — IOPAMIDOL 612 MG/ML
100 INJECTION, SOLUTION INTRAVASCULAR
Status: COMPLETED | OUTPATIENT
Start: 2025-08-04 | End: 2025-08-04

## 2025-08-04 RX ORDER — KETOROLAC TROMETHAMINE 30 MG/ML
30 INJECTION, SOLUTION INTRAMUSCULAR; INTRAVENOUS ONCE
Status: COMPLETED | OUTPATIENT
Start: 2025-08-04 | End: 2025-08-04

## 2025-08-04 RX ORDER — ONDANSETRON 2 MG/ML
4 INJECTION INTRAMUSCULAR; INTRAVENOUS ONCE
Status: COMPLETED | OUTPATIENT
Start: 2025-08-04 | End: 2025-08-04

## 2025-08-04 RX ORDER — DIPHENHYDRAMINE HYDROCHLORIDE 50 MG/ML
50 INJECTION, SOLUTION INTRAMUSCULAR; INTRAVENOUS
Status: COMPLETED | OUTPATIENT
Start: 2025-08-04 | End: 2025-08-04

## 2025-08-04 RX ADMIN — IOPAMIDOL 100 ML: 612 INJECTION, SOLUTION INTRAVENOUS at 19:02

## 2025-08-04 RX ADMIN — WATER 40 MG: 1 INJECTION INTRAMUSCULAR; INTRAVENOUS; SUBCUTANEOUS at 19:15

## 2025-08-04 RX ADMIN — SODIUM CHLORIDE 1000 ML: 9 INJECTION, SOLUTION INTRAVENOUS at 16:24

## 2025-08-04 RX ADMIN — ONDANSETRON 4 MG: 2 INJECTION INTRAMUSCULAR; INTRAVENOUS at 17:08

## 2025-08-04 RX ADMIN — KETOROLAC TROMETHAMINE 30 MG: 30 INJECTION INTRAMUSCULAR; INTRAVENOUS at 21:58

## 2025-08-04 RX ADMIN — MORPHINE SULFATE 4 MG: 4 INJECTION, SOLUTION INTRAMUSCULAR; INTRAVENOUS at 20:14

## 2025-08-04 RX ADMIN — DIPHENHYDRAMINE HYDROCHLORIDE 50 MG: 50 INJECTION, SOLUTION INTRAMUSCULAR; INTRAVENOUS at 18:45

## 2025-08-05 ENCOUNTER — OFFICE VISIT (OUTPATIENT)
Dept: FAMILY MEDICINE CLINIC | Facility: CLINIC | Age: 55
End: 2025-08-05
Payer: COMMERCIAL

## 2025-08-05 VITALS
DIASTOLIC BLOOD PRESSURE: 82 MMHG | SYSTOLIC BLOOD PRESSURE: 130 MMHG | RESPIRATION RATE: 20 BRPM | WEIGHT: 225.2 LBS | BODY MASS INDEX: 35.35 KG/M2 | OXYGEN SATURATION: 98 % | HEIGHT: 67 IN | HEART RATE: 101 BPM

## 2025-08-05 DIAGNOSIS — K59.1 FUNCTIONAL DIARRHEA: Primary | ICD-10-CM

## 2025-08-05 DIAGNOSIS — J30.1 SEASONAL ALLERGIC RHINITIS DUE TO POLLEN: ICD-10-CM

## 2025-08-05 DIAGNOSIS — K21.9 GASTROESOPHAGEAL REFLUX DISEASE WITHOUT ESOPHAGITIS: ICD-10-CM

## 2025-08-05 DIAGNOSIS — I10 ESSENTIAL HYPERTENSION: ICD-10-CM

## 2025-08-05 DIAGNOSIS — J45.30 MILD PERSISTENT ASTHMA WITHOUT COMPLICATION: ICD-10-CM

## 2025-08-05 PROCEDURE — 99214 OFFICE O/P EST MOD 30 MIN: CPT | Performed by: NURSE PRACTITIONER

## 2025-08-05 PROCEDURE — 3079F DIAST BP 80-89 MM HG: CPT | Performed by: NURSE PRACTITIONER

## 2025-08-05 PROCEDURE — 1125F AMNT PAIN NOTED PAIN PRSNT: CPT | Performed by: NURSE PRACTITIONER

## 2025-08-05 PROCEDURE — 3075F SYST BP GE 130 - 139MM HG: CPT | Performed by: NURSE PRACTITIONER

## 2025-08-05 RX ORDER — IPRATROPIUM BROMIDE AND ALBUTEROL 20; 100 UG/1; UG/1
1 SPRAY, METERED RESPIRATORY (INHALATION)
Qty: 4 G | Refills: 5 | Status: SHIPPED | OUTPATIENT
Start: 2025-08-05

## 2025-08-05 RX ORDER — COLESTIPOL HYDROCHLORIDE 1 G/1
1 TABLET ORAL 2 TIMES DAILY
Qty: 60 TABLET | Refills: 1 | Status: SHIPPED | OUTPATIENT
Start: 2025-08-05

## 2025-08-05 RX ORDER — FLUTICASONE PROPIONATE 110 UG/1
1 AEROSOL, METERED RESPIRATORY (INHALATION) 2 TIMES DAILY
Qty: 12 G | Refills: 5 | Status: SHIPPED | OUTPATIENT
Start: 2025-08-05

## 2025-08-06 ENCOUNTER — SPECIALTY PHARMACY (OUTPATIENT)
Dept: PHARMACY | Facility: HOSPITAL | Age: 55
End: 2025-08-06
Payer: COMMERCIAL

## 2025-08-16 ENCOUNTER — APPOINTMENT (OUTPATIENT)
Dept: CT IMAGING | Facility: HOSPITAL | Age: 55
End: 2025-08-16
Payer: COMMERCIAL

## 2025-08-16 ENCOUNTER — HOSPITAL ENCOUNTER (EMERGENCY)
Facility: HOSPITAL | Age: 55
Discharge: HOME | End: 2025-08-17
Attending: STUDENT IN AN ORGANIZED HEALTH CARE EDUCATION/TRAINING PROGRAM | Admitting: EMERGENCY MEDICINE
Payer: COMMERCIAL

## 2025-08-16 ENCOUNTER — APPOINTMENT (OUTPATIENT)
Dept: GENERAL RADIOLOGY | Facility: HOSPITAL | Age: 55
End: 2025-08-16
Payer: COMMERCIAL

## 2025-08-16 DIAGNOSIS — R11.2 NAUSEA VOMITING AND DIARRHEA: Primary | ICD-10-CM

## 2025-08-16 DIAGNOSIS — R19.7 NAUSEA VOMITING AND DIARRHEA: Primary | ICD-10-CM

## 2025-08-16 LAB
ALBUMIN SERPL-MCNC: 4.3 G/DL (ref 3.5–5.2)
ALBUMIN/GLOB SERPL: 1.3 G/DL
ALP SERPL-CCNC: 106 U/L (ref 39–117)
ALT SERPL W P-5'-P-CCNC: 26 U/L (ref 1–41)
ANION GAP SERPL CALCULATED.3IONS-SCNC: 12.3 MMOL/L (ref 5–15)
AST SERPL-CCNC: 24 U/L (ref 1–40)
B PARAPERT DNA SPEC QL NAA+PROBE: NOT DETECTED
B PERT DNA SPEC QL NAA+PROBE: NOT DETECTED
BACTERIA UR QL AUTO: NORMAL /HPF
BASOPHILS # BLD AUTO: 0.06 10*3/MM3 (ref 0–0.2)
BASOPHILS NFR BLD AUTO: 0.4 % (ref 0–1.5)
BILIRUB SERPL-MCNC: 0.3 MG/DL (ref 0–1.2)
BILIRUB UR QL STRIP: NEGATIVE
BUN SERPL-MCNC: 12.2 MG/DL (ref 6–20)
BUN/CREAT SERPL: 16.7 (ref 7–25)
C PNEUM DNA NPH QL NAA+NON-PROBE: NOT DETECTED
CALCIUM SPEC-SCNC: 9 MG/DL (ref 8.6–10.5)
CHLORIDE SERPL-SCNC: 103 MMOL/L (ref 98–107)
CLARITY UR: CLEAR
CO2 SERPL-SCNC: 22.7 MMOL/L (ref 22–29)
COLOR UR: ABNORMAL
CREAT SERPL-MCNC: 0.73 MG/DL (ref 0.76–1.27)
CRP SERPL-MCNC: 1.08 MG/DL (ref 0–0.5)
DEPRECATED RDW RBC AUTO: 43.6 FL (ref 37–54)
EGFRCR SERPLBLD CKD-EPI 2021: 109.5 ML/MIN/1.73
EOSINOPHIL # BLD AUTO: 0.24 10*3/MM3 (ref 0–0.4)
EOSINOPHIL NFR BLD AUTO: 1.6 % (ref 0.3–6.2)
ERYTHROCYTE [DISTWIDTH] IN BLOOD BY AUTOMATED COUNT: 12.7 % (ref 12.3–15.4)
FLUAV SUBTYP SPEC NAA+PROBE: NOT DETECTED
FLUBV RNA NPH QL NAA+NON-PROBE: NOT DETECTED
GEN 5 1HR TROPONIN T REFLEX: 6 NG/L
GLOBULIN UR ELPH-MCNC: 3.4 GM/DL
GLUCOSE SERPL-MCNC: 99 MG/DL (ref 65–99)
GLUCOSE UR STRIP-MCNC: NEGATIVE MG/DL
HADV DNA SPEC NAA+PROBE: NOT DETECTED
HCOV 229E RNA SPEC QL NAA+PROBE: NOT DETECTED
HCOV HKU1 RNA SPEC QL NAA+PROBE: NOT DETECTED
HCOV NL63 RNA SPEC QL NAA+PROBE: NOT DETECTED
HCOV OC43 RNA SPEC QL NAA+PROBE: NOT DETECTED
HCT VFR BLD AUTO: 46.1 % (ref 37.5–51)
HGB BLD-MCNC: 16.6 G/DL (ref 13–17.7)
HGB UR QL STRIP.AUTO: NEGATIVE
HMPV RNA NPH QL NAA+NON-PROBE: NOT DETECTED
HOLD SPECIMEN: NORMAL
HOLD SPECIMEN: NORMAL
HPIV1 RNA ISLT QL NAA+PROBE: NOT DETECTED
HPIV2 RNA SPEC QL NAA+PROBE: NOT DETECTED
HPIV3 RNA NPH QL NAA+PROBE: NOT DETECTED
HPIV4 P GENE NPH QL NAA+PROBE: NOT DETECTED
HYALINE CASTS UR QL AUTO: NORMAL /LPF
IMM GRANULOCYTES # BLD AUTO: 0.05 10*3/MM3 (ref 0–0.05)
IMM GRANULOCYTES NFR BLD AUTO: 0.3 % (ref 0–0.5)
KETONES UR QL STRIP: ABNORMAL
LEUKOCYTE ESTERASE UR QL STRIP.AUTO: ABNORMAL
LIPASE SERPL-CCNC: 34 U/L (ref 13–60)
LYMPHOCYTES # BLD AUTO: 2.09 10*3/MM3 (ref 0.7–3.1)
LYMPHOCYTES NFR BLD AUTO: 13.9 % (ref 19.6–45.3)
M PNEUMO IGG SER IA-ACNC: NOT DETECTED
MAGNESIUM SERPL-MCNC: 1.6 MG/DL (ref 1.6–2.6)
MCH RBC QN AUTO: 33.4 PG (ref 26.6–33)
MCHC RBC AUTO-ENTMCNC: 36 G/DL (ref 31.5–35.7)
MCV RBC AUTO: 92.8 FL (ref 79–97)
MONOCYTES # BLD AUTO: 1.14 10*3/MM3 (ref 0.1–0.9)
MONOCYTES NFR BLD AUTO: 7.6 % (ref 5–12)
NEUTROPHILS NFR BLD AUTO: 11.41 10*3/MM3 (ref 1.7–7)
NEUTROPHILS NFR BLD AUTO: 76.2 % (ref 42.7–76)
NITRITE UR QL STRIP: NEGATIVE
NRBC BLD AUTO-RTO: 0 /100 WBC (ref 0–0.2)
PH UR STRIP.AUTO: 5.5 [PH] (ref 5–8)
PLATELET # BLD AUTO: 199 10*3/MM3 (ref 140–450)
PMV BLD AUTO: 10.1 FL (ref 6–12)
POTASSIUM SERPL-SCNC: 4 MMOL/L (ref 3.5–5.2)
PROT SERPL-MCNC: 7.7 G/DL (ref 6–8.5)
PROT UR QL STRIP: NEGATIVE
RBC # BLD AUTO: 4.97 10*6/MM3 (ref 4.14–5.8)
RBC # UR STRIP: NORMAL /HPF
REF LAB TEST METHOD: NORMAL
RHINOVIRUS RNA SPEC NAA+PROBE: NOT DETECTED
RSV RNA NPH QL NAA+NON-PROBE: NOT DETECTED
SARS-COV-2 RNA RESP QL NAA+PROBE: NOT DETECTED
SODIUM SERPL-SCNC: 138 MMOL/L (ref 136–145)
SP GR UR STRIP: 1.03 (ref 1–1.03)
SQUAMOUS #/AREA URNS HPF: NORMAL /HPF
TROPONIN T NUMERIC DELTA: 0 NG/L
TROPONIN T SERPL HS-MCNC: 6 NG/L
UROBILINOGEN UR QL STRIP: ABNORMAL
WBC # UR STRIP: NORMAL /HPF
WBC NRBC COR # BLD AUTO: 14.99 10*3/MM3 (ref 3.4–10.8)
WHOLE BLOOD HOLD COAG: NORMAL
WHOLE BLOOD HOLD SPECIMEN: NORMAL

## 2025-08-16 PROCEDURE — 99284 EMERGENCY DEPT VISIT MOD MDM: CPT

## 2025-08-16 PROCEDURE — 0202U NFCT DS 22 TRGT SARS-COV-2: CPT

## 2025-08-16 PROCEDURE — 36415 COLL VENOUS BLD VENIPUNCTURE: CPT

## 2025-08-16 PROCEDURE — 74176 CT ABD & PELVIS W/O CONTRAST: CPT

## 2025-08-16 PROCEDURE — 96374 THER/PROPH/DIAG INJ IV PUSH: CPT

## 2025-08-16 PROCEDURE — 71046 X-RAY EXAM CHEST 2 VIEWS: CPT | Performed by: RADIOLOGY

## 2025-08-16 PROCEDURE — 71046 X-RAY EXAM CHEST 2 VIEWS: CPT

## 2025-08-16 PROCEDURE — 81001 URINALYSIS AUTO W/SCOPE: CPT

## 2025-08-16 PROCEDURE — 83735 ASSAY OF MAGNESIUM: CPT

## 2025-08-16 PROCEDURE — 84484 ASSAY OF TROPONIN QUANT: CPT | Performed by: STUDENT IN AN ORGANIZED HEALTH CARE EDUCATION/TRAINING PROGRAM

## 2025-08-16 PROCEDURE — 85025 COMPLETE CBC W/AUTO DIFF WBC: CPT | Performed by: STUDENT IN AN ORGANIZED HEALTH CARE EDUCATION/TRAINING PROGRAM

## 2025-08-16 PROCEDURE — 83690 ASSAY OF LIPASE: CPT

## 2025-08-16 PROCEDURE — 25010000002 ONDANSETRON PER 1 MG

## 2025-08-16 PROCEDURE — 86140 C-REACTIVE PROTEIN: CPT

## 2025-08-16 PROCEDURE — 25810000003 SODIUM CHLORIDE 0.9 % SOLUTION

## 2025-08-16 PROCEDURE — 93005 ELECTROCARDIOGRAM TRACING: CPT | Performed by: STUDENT IN AN ORGANIZED HEALTH CARE EDUCATION/TRAINING PROGRAM

## 2025-08-16 PROCEDURE — 80053 COMPREHEN METABOLIC PANEL: CPT | Performed by: STUDENT IN AN ORGANIZED HEALTH CARE EDUCATION/TRAINING PROGRAM

## 2025-08-16 RX ORDER — SODIUM CHLORIDE 0.9 % (FLUSH) 0.9 %
10 SYRINGE (ML) INJECTION AS NEEDED
Status: DISCONTINUED | OUTPATIENT
Start: 2025-08-16 | End: 2025-08-17 | Stop reason: HOSPADM

## 2025-08-16 RX ORDER — ONDANSETRON 2 MG/ML
4 INJECTION INTRAMUSCULAR; INTRAVENOUS ONCE
Status: COMPLETED | OUTPATIENT
Start: 2025-08-16 | End: 2025-08-16

## 2025-08-16 RX ADMIN — ONDANSETRON 4 MG: 2 INJECTION, SOLUTION INTRAMUSCULAR; INTRAVENOUS at 22:24

## 2025-08-16 RX ADMIN — SODIUM CHLORIDE 1000 ML: 9 INJECTION, SOLUTION INTRAVENOUS at 22:24

## 2025-08-17 VITALS
SYSTOLIC BLOOD PRESSURE: 131 MMHG | HEART RATE: 81 BPM | WEIGHT: 235 LBS | RESPIRATION RATE: 16 BRPM | TEMPERATURE: 98.6 F | DIASTOLIC BLOOD PRESSURE: 88 MMHG | OXYGEN SATURATION: 97 % | HEIGHT: 67 IN | BODY MASS INDEX: 36.88 KG/M2

## 2025-08-17 LAB
QT INTERVAL: 344 MS
QT INTERVAL: 386 MS
QT INTERVAL: 388 MS
QTC INTERVAL: 407 MS
QTC INTERVAL: 418 MS
QTC INTERVAL: 430 MS

## 2025-08-17 PROCEDURE — 93005 ELECTROCARDIOGRAM TRACING: CPT | Performed by: EMERGENCY MEDICINE

## 2025-08-17 PROCEDURE — 96372 THER/PROPH/DIAG INJ SC/IM: CPT

## 2025-08-17 PROCEDURE — 74176 CT ABD & PELVIS W/O CONTRAST: CPT | Performed by: RADIOLOGY

## 2025-08-17 PROCEDURE — 25010000002 DICYCLOMINE PER 20 MG: Performed by: STUDENT IN AN ORGANIZED HEALTH CARE EDUCATION/TRAINING PROGRAM

## 2025-08-17 RX ORDER — LOPERAMIDE HYDROCHLORIDE 2 MG/1
2 CAPSULE ORAL 4 TIMES DAILY PRN
Qty: 8 CAPSULE | Refills: 0 | Status: SHIPPED | OUTPATIENT
Start: 2025-08-17

## 2025-08-17 RX ORDER — DICYCLOMINE HCL 20 MG
20 TABLET ORAL EVERY 6 HOURS PRN
Qty: 12 TABLET | Refills: 0 | Status: SHIPPED | OUTPATIENT
Start: 2025-08-17 | End: 2025-08-21 | Stop reason: SDUPTHER

## 2025-08-17 RX ORDER — DICYCLOMINE HYDROCHLORIDE 10 MG/ML
20 INJECTION INTRAMUSCULAR ONCE
Status: COMPLETED | OUTPATIENT
Start: 2025-08-17 | End: 2025-08-17

## 2025-08-17 RX ORDER — ONDANSETRON 4 MG/1
4 TABLET, ORALLY DISINTEGRATING ORAL EVERY 6 HOURS PRN
Qty: 10 TABLET | Refills: 0 | Status: SHIPPED | OUTPATIENT
Start: 2025-08-17 | End: 2025-08-21

## 2025-08-17 RX ORDER — LOPERAMIDE HYDROCHLORIDE 2 MG/1
4 CAPSULE ORAL ONCE
Status: COMPLETED | OUTPATIENT
Start: 2025-08-17 | End: 2025-08-17

## 2025-08-17 RX ADMIN — DICYCLOMINE HYDROCHLORIDE 20 MG: 10 INJECTION, SOLUTION INTRAMUSCULAR at 02:04

## 2025-08-17 RX ADMIN — LOPERAMIDE HYDROCHLORIDE 4 MG: 2 CAPSULE ORAL at 05:25

## 2025-08-20 ENCOUNTER — SPECIALTY PHARMACY (OUTPATIENT)
Dept: PHARMACY | Facility: HOSPITAL | Age: 55
End: 2025-08-20
Payer: COMMERCIAL

## 2025-08-21 ENCOUNTER — OFFICE VISIT (OUTPATIENT)
Dept: FAMILY MEDICINE CLINIC | Facility: CLINIC | Age: 55
End: 2025-08-21
Payer: COMMERCIAL

## 2025-08-21 VITALS
TEMPERATURE: 97.4 F | HEART RATE: 89 BPM | DIASTOLIC BLOOD PRESSURE: 74 MMHG | WEIGHT: 230 LBS | SYSTOLIC BLOOD PRESSURE: 130 MMHG | OXYGEN SATURATION: 99 % | BODY MASS INDEX: 36.1 KG/M2 | HEIGHT: 67 IN

## 2025-08-21 DIAGNOSIS — R19.7 DIARRHEA OF PRESUMED INFECTIOUS ORIGIN: Primary | ICD-10-CM

## 2025-08-21 DIAGNOSIS — R10.84 GENERALIZED ABDOMINAL PAIN: ICD-10-CM

## 2025-08-21 DIAGNOSIS — R11.0 NAUSEA: ICD-10-CM

## 2025-08-21 LAB
027 TOXIN: ABNORMAL
C DIFF GDH + TOXINS A+B STL QL IA.RAPID: NEGATIVE
C DIFF TOX GENS STL QL NAA+PROBE: POSITIVE

## 2025-08-21 RX ORDER — ONDANSETRON 4 MG/1
4 TABLET, FILM COATED ORAL EVERY 8 HOURS PRN
Qty: 30 TABLET | Refills: 0 | Status: SHIPPED | OUTPATIENT
Start: 2025-08-21

## 2025-08-21 RX ORDER — DICYCLOMINE HCL 20 MG
20 TABLET ORAL EVERY 6 HOURS PRN
Qty: 30 TABLET | Refills: 0 | Status: SHIPPED | OUTPATIENT
Start: 2025-08-21

## 2025-08-22 ENCOUNTER — APPOINTMENT (OUTPATIENT)
Dept: CT IMAGING | Facility: HOSPITAL | Age: 55
End: 2025-08-22
Payer: COMMERCIAL

## 2025-08-22 ENCOUNTER — HOSPITAL ENCOUNTER (EMERGENCY)
Facility: HOSPITAL | Age: 55
Discharge: HOME | End: 2025-08-22
Payer: COMMERCIAL

## 2025-08-27 ENCOUNTER — OFFICE VISIT (OUTPATIENT)
Dept: FAMILY MEDICINE CLINIC | Facility: CLINIC | Age: 55
End: 2025-08-27
Payer: COMMERCIAL

## 2025-08-27 VITALS
WEIGHT: 225.8 LBS | BODY MASS INDEX: 35.44 KG/M2 | OXYGEN SATURATION: 97 % | HEART RATE: 95 BPM | SYSTOLIC BLOOD PRESSURE: 122 MMHG | RESPIRATION RATE: 18 BRPM | HEIGHT: 67 IN | DIASTOLIC BLOOD PRESSURE: 80 MMHG

## 2025-08-27 DIAGNOSIS — F51.04 CHRONIC INSOMNIA: ICD-10-CM

## 2025-08-27 DIAGNOSIS — A04.72 C. DIFFICILE DIARRHEA: Primary | ICD-10-CM

## 2025-08-27 DIAGNOSIS — E16.2 HYPOGLYCEMIA: ICD-10-CM

## 2025-08-27 PROCEDURE — 85025 COMPLETE CBC W/AUTO DIFF WBC: CPT | Performed by: NURSE PRACTITIONER

## 2025-08-27 PROCEDURE — 1159F MED LIST DOCD IN RCRD: CPT | Performed by: NURSE PRACTITIONER

## 2025-08-27 PROCEDURE — 80053 COMPREHEN METABOLIC PANEL: CPT | Performed by: NURSE PRACTITIONER

## 2025-08-27 PROCEDURE — 99214 OFFICE O/P EST MOD 30 MIN: CPT | Performed by: NURSE PRACTITIONER

## 2025-08-27 PROCEDURE — 1160F RVW MEDS BY RX/DR IN RCRD: CPT | Performed by: NURSE PRACTITIONER

## 2025-08-27 PROCEDURE — 1126F AMNT PAIN NOTED NONE PRSNT: CPT | Performed by: NURSE PRACTITIONER

## 2025-08-27 PROCEDURE — 3079F DIAST BP 80-89 MM HG: CPT | Performed by: NURSE PRACTITIONER

## 2025-08-27 PROCEDURE — 3074F SYST BP LT 130 MM HG: CPT | Performed by: NURSE PRACTITIONER

## 2025-08-27 PROCEDURE — 86140 C-REACTIVE PROTEIN: CPT | Performed by: NURSE PRACTITIONER

## 2025-08-27 RX ORDER — ACYCLOVIR 400 MG/1
1 TABLET ORAL
Qty: 3 EACH | Refills: 5 | Status: SHIPPED | OUTPATIENT
Start: 2025-08-27

## 2025-08-27 RX ORDER — LACTOBACILLUS RHAMNOSUS GG 10B CELL
1 CAPSULE ORAL DAILY
Qty: 30 CAPSULE | Refills: 2 | Status: SHIPPED | OUTPATIENT
Start: 2025-08-27

## 2025-08-27 RX ORDER — ACYCLOVIR 400 MG/1
1 TABLET ORAL DAILY
Qty: 1 EACH | Refills: 0 | Status: SHIPPED | OUTPATIENT
Start: 2025-08-27

## 2025-08-28 LAB
ALBUMIN SERPL-MCNC: 4 G/DL (ref 3.5–5.2)
ALBUMIN/GLOB SERPL: 1.2 G/DL
ALP SERPL-CCNC: 92 U/L (ref 39–117)
ALT SERPL W P-5'-P-CCNC: 34 U/L (ref 1–41)
ANION GAP SERPL CALCULATED.3IONS-SCNC: 13.7 MMOL/L (ref 5–15)
AST SERPL-CCNC: 28 U/L (ref 1–40)
BASOPHILS # BLD AUTO: 0.04 10*3/MM3 (ref 0–0.2)
BASOPHILS NFR BLD AUTO: 0.6 % (ref 0–1.5)
BILIRUB SERPL-MCNC: 0.2 MG/DL (ref 0–1.2)
BUN SERPL-MCNC: 5 MG/DL (ref 6–20)
BUN/CREAT SERPL: 6.3 (ref 7–25)
CALCIUM SPEC-SCNC: 9 MG/DL (ref 8.6–10.5)
CHLORIDE SERPL-SCNC: 102 MMOL/L (ref 98–107)
CO2 SERPL-SCNC: 22.3 MMOL/L (ref 22–29)
CREAT SERPL-MCNC: 0.8 MG/DL (ref 0.76–1.27)
CRP SERPL-MCNC: 1.05 MG/DL (ref 0–0.5)
DEPRECATED RDW RBC AUTO: 46.4 FL (ref 37–54)
EGFRCR SERPLBLD CKD-EPI 2021: 106.5 ML/MIN/1.73
EOSINOPHIL # BLD AUTO: 0.29 10*3/MM3 (ref 0–0.4)
EOSINOPHIL NFR BLD AUTO: 4.4 % (ref 0.3–6.2)
ERYTHROCYTE [DISTWIDTH] IN BLOOD BY AUTOMATED COUNT: 13.1 % (ref 12.3–15.4)
GLOBULIN UR ELPH-MCNC: 3.3 GM/DL
GLUCOSE SERPL-MCNC: 98 MG/DL (ref 65–99)
HCT VFR BLD AUTO: 42.4 % (ref 37.5–51)
HGB BLD-MCNC: 14.5 G/DL (ref 13–17.7)
IMM GRANULOCYTES # BLD AUTO: 0.02 10*3/MM3 (ref 0–0.05)
IMM GRANULOCYTES NFR BLD AUTO: 0.3 % (ref 0–0.5)
LYMPHOCYTES # BLD AUTO: 1.73 10*3/MM3 (ref 0.7–3.1)
LYMPHOCYTES NFR BLD AUTO: 26 % (ref 19.6–45.3)
MCH RBC QN AUTO: 33 PG (ref 26.6–33)
MCHC RBC AUTO-ENTMCNC: 34.2 G/DL (ref 31.5–35.7)
MCV RBC AUTO: 96.4 FL (ref 79–97)
MONOCYTES # BLD AUTO: 0.63 10*3/MM3 (ref 0.1–0.9)
MONOCYTES NFR BLD AUTO: 9.5 % (ref 5–12)
NEUTROPHILS NFR BLD AUTO: 3.94 10*3/MM3 (ref 1.7–7)
NEUTROPHILS NFR BLD AUTO: 59.2 % (ref 42.7–76)
NRBC BLD AUTO-RTO: 0 /100 WBC (ref 0–0.2)
PLATELET # BLD AUTO: 194 10*3/MM3 (ref 140–450)
PMV BLD AUTO: 12.6 FL (ref 6–12)
POTASSIUM SERPL-SCNC: 3.9 MMOL/L (ref 3.5–5.2)
PROT SERPL-MCNC: 7.3 G/DL (ref 6–8.5)
RBC # BLD AUTO: 4.4 10*6/MM3 (ref 4.14–5.8)
SODIUM SERPL-SCNC: 138 MMOL/L (ref 136–145)
WBC NRBC COR # BLD AUTO: 6.65 10*3/MM3 (ref 3.4–10.8)

## (undated) DEVICE — PK KN ARTHSCP 70

## (undated) DEVICE — SUT VIC 0 CP1 27IN J267H

## (undated) DEVICE — PAD REPL POST SURG TOTL KN

## (undated) DEVICE — FRCP BX RADJAW4 NDL 2.8 240CM LG OG BX40

## (undated) DEVICE — HOLDER: Brand: DEROYAL

## (undated) DEVICE — PAD LEG HLDR

## (undated) DEVICE — PK KN ARTHSCP 20

## (undated) DEVICE — SPNG GZ WOVN 4X4IN 12PLY 10/BX STRL

## (undated) DEVICE — PK CATH CARD 70

## (undated) DEVICE — BNDG ELAS CO-FLEX SLF ADHR 4IN5YD LF STRL

## (undated) DEVICE — GLV SURG SENSICARE PI ORTHO SZ7.5 LF STRL

## (undated) DEVICE — 4-PORT MANIFOLD: Brand: NEPTUNE 2

## (undated) DEVICE — ST INF PRI SMRTSTE 20DRP 2VLV 24ML 117

## (undated) DEVICE — BNDG ELAS ELITE V/CLOSE 6IN 5YD LF STRL

## (undated) DEVICE — EMERALD ARTHROSCOPY SHEET: Brand: CONVERTORS

## (undated) DEVICE — ENDOGATOR AUXILIARY WATER JET CONNECTOR: Brand: ENDOGATOR

## (undated) DEVICE — DRSNG WND GZ CURAD OIL EMULSION 3X8IN LF STRL 1PK

## (undated) DEVICE — SUT ETHLN 4/0 P3 18IN 699G

## (undated) DEVICE — GLV SURG TRIUMPH PF LTX 8 STRL

## (undated) DEVICE — SYR LUERLOK 30CC

## (undated) DEVICE — CONN Y IRR DISP 1P/U

## (undated) DEVICE — NDL,TUOHY EPID,17GX3.5",PLASTIC STYLET: Brand: MEDLINE

## (undated) DEVICE — PAD GRND REM POLYHESIVE A/ DISP

## (undated) DEVICE — DISPOSABLE TOURNIQUET CUFF SINGLE BLADDER, SINGLE PORT AND LUER LOCK CONNECTOR: Brand: COLOR CUFF

## (undated) DEVICE — MYNXGRIP 5F: Brand: MYNXGRIP

## (undated) DEVICE — NDL SPINE 20G 3 1/2 YEL STRL 1P/U

## (undated) DEVICE — 4.5 MM FULL RADIUS STRAIGHT                                    BLADES, POWER/EP-1, YELLOW, PACKAGED                                    6 PER BOX, STERILE: Brand: DYONICS

## (undated) DEVICE — BNDG ELAS ELITE V/CLOSE 4IN 5YD LF STRL

## (undated) DEVICE — SUT ETHLN 3/0 FS1 663G

## (undated) DEVICE — CATH F5 INF 3DRC 100CM: Brand: INFINITI

## (undated) DEVICE — APPL CHLORAPREP HI/LITE 26ML ORNG

## (undated) DEVICE — SUT/ANCH BIOCOMP PUSH/LK 2.9X12.5MM: Type: IMPLANTABLE DEVICE | Status: NON-FUNCTIONAL

## (undated) DEVICE — SOL IRR NACL 0.9PCT 3000ML

## (undated) DEVICE — BLD CUT FORMLA AGGR PLS 4.0MM

## (undated) DEVICE — SHEATH INTRO SUPERSHEATH JWIRE .035 5F 11CM

## (undated) DEVICE — DRSNG WND GZ CURAD OIL EMULSION 3X3IN STRL

## (undated) DEVICE — UNDERCAST PADDING: Brand: DEROYAL

## (undated) DEVICE — ADULT DISPOSABLE SINGLE-PATIENT USE PULSE OXIMETER SENSOR: Brand: NONIN

## (undated) DEVICE — GLV SURG SENSICARE W/ALOE PF LF 8.5 STRL

## (undated) DEVICE — SKIN AFFIX SURG ADHESIVE 72/CS 0.55ML: Brand: MEDLINE

## (undated) DEVICE — PROB ABLAT SERFAS ENERGY 90S 3.5MM

## (undated) DEVICE — DYONICS 25 PATIENT TUBE SET MUST                                    BE USED WITH 7211007, 12 PER BOX

## (undated) DEVICE — MODEL AT P65, P/N 701554-001KIT CONTENTS: HAND CONTROLLER, 3-WAY HIGH-PRESSURE STOPCOCK WITH ROTATING END AND PREMIUM HIGH-PRESSURE TUBING: Brand: ANGIOTOUCH® KIT

## (undated) DEVICE — GLV SURG PREMIERPRO MIC LTX PF SZ8 BRN

## (undated) DEVICE — MAT FLR ABSORBENT LG 4FT 10 2.5FT

## (undated) DEVICE — CATH F5 INF JL 4 100CM: Brand: INFINITI

## (undated) DEVICE — SPLNT ORTHOGLASS 4INX15FT

## (undated) DEVICE — CATH F5 INF JR 4 100CM: Brand: INFINITI

## (undated) DEVICE — GLV SURG SENSICARE SLT PF LF 7.5 STRL

## (undated) DEVICE — SUT VIC 2/0 CP1 27IN J266H

## (undated) DEVICE — PROXIMATE RH ROTATING HEAD SKIN STAPLERS (35 WIDE) CONTAINS 35 STAINLESS STEEL STAPLES: Brand: PROXIMATE

## (undated) DEVICE — CANNULA,OXY,ADULT,SUPER SOFT,W/14'TUB,UC: Brand: MEDLINE INDUSTRIES, INC.

## (undated) DEVICE — TBG PUMP ARTHSCP MAIN AR6400 16FT

## (undated) DEVICE — CATH F5 INF AR MOD 100CM: Brand: INFINITI

## (undated) DEVICE — ANTIBACTERIAL UNDYED BRAIDED (POLYGLACTIN 910), SYNTHETIC ABSORBABLE SUTURE: Brand: COATED VICRYL

## (undated) DEVICE — CATH F5 INF PIG145 110CM 6SH: Brand: INFINITI

## (undated) DEVICE — TRY SKINPREP PVP SCRB W PAINT

## (undated) DEVICE — APPL CHLORAPREP W/TINT 10.5ML ORNG BX25

## (undated) DEVICE — SUCTION CANISTER, 1500CC, RIGID: Brand: DEROYAL

## (undated) DEVICE — GOWN,NON-REINFORCED,SIRUS,SET IN SLV,XXL: Brand: MEDLINE

## (undated) DEVICE — THE EXACTO COLD SNARE IS INTENDED TO BE USED WITHOUT DIATHERMIC ENERGY FOR THE ENDOSCOPIC RESECTION OF POLYP TISSUE IN THE GASTROINTESTINAL TRACT.: Brand: EXACTO

## (undated) DEVICE — GW MOVE CORE .035 145CM

## (undated) DEVICE — KT PUSHLOCK  2.9MM DISP

## (undated) DEVICE — SUT ETHLN 3-0 FS118IN 663H

## (undated) DEVICE — A2000 MULTI-USE SYRINGE KIT, P/N 701277-003KIT CONTENTS: 100ML CONTRAST RESERVOIR AND TUBING WITH CONTRAST SPIKE AND CLAMP: Brand: A2000 MULTI-USE SYRINGE KIT

## (undated) DEVICE — DISPOSABLE TOURNIQUET CUFF SINGLE BLADDER, SINGLE PORT AND QUICK CONNECT CONNECTOR: Brand: COLOR CUFF

## (undated) DEVICE — SUT ETHLN 3/0 PS2 18 IN 1669H

## (undated) DEVICE — THE BITE BLOCK MAXI, LATEX FREE STRAP IS USED TO PROTECT THE ENDOSCOPE INSERTION TUBE FROM BEING BITTEN BY THE PATIENT.

## (undated) DEVICE — GOWN,REINF,POLY,ECL,PP SLV,XL: Brand: MEDLINE

## (undated) DEVICE — ST EXT IV SMARTSITE 2VLV SP M LL 5ML IV1

## (undated) DEVICE — PAD,ABDOMINAL,5"X9",STERILE,LF,1/PK: Brand: MEDLINE INDUSTRIES, INC.

## (undated) DEVICE — POSTN SURG LEG WELL LOW EXTREM 1P/U

## (undated) DEVICE — DRSNG PAD ABD 8X10IN STRL

## (undated) DEVICE — PK EXTREM UPPR 70

## (undated) DEVICE — DRSNG SURESITE WNDW 4X4.5

## (undated) DEVICE — Device